# Patient Record
Sex: FEMALE | Race: WHITE | NOT HISPANIC OR LATINO | Employment: OTHER | URBAN - METROPOLITAN AREA
[De-identification: names, ages, dates, MRNs, and addresses within clinical notes are randomized per-mention and may not be internally consistent; named-entity substitution may affect disease eponyms.]

---

## 2017-01-04 ENCOUNTER — APPOINTMENT (OUTPATIENT)
Dept: RADIOLOGY | Facility: MEDICAL CENTER | Age: 37
DRG: 640 | End: 2017-01-04
Payer: MEDICARE

## 2017-01-04 ENCOUNTER — RESOLUTE PROFESSIONAL BILLING HOSPITAL PROF FEE (OUTPATIENT)
Dept: HOSPITALIST | Facility: MEDICAL CENTER | Age: 37
End: 2017-01-04
Payer: MEDICARE

## 2017-01-04 ENCOUNTER — HOSPITAL ENCOUNTER (INPATIENT)
Facility: MEDICAL CENTER | Age: 37
LOS: 1 days | DRG: 640 | End: 2017-01-04
Attending: EMERGENCY MEDICINE | Admitting: INTERNAL MEDICINE
Payer: MEDICARE

## 2017-01-04 VITALS
DIASTOLIC BLOOD PRESSURE: 85 MMHG | OXYGEN SATURATION: 97 % | HEART RATE: 84 BPM | TEMPERATURE: 98 F | WEIGHT: 175.04 LBS | RESPIRATION RATE: 16 BRPM | HEIGHT: 65 IN | SYSTOLIC BLOOD PRESSURE: 153 MMHG | BODY MASS INDEX: 29.16 KG/M2

## 2017-01-04 DIAGNOSIS — Z91.158 NONCOMPLIANCE WITH RENAL DIALYSIS: ICD-10-CM

## 2017-01-04 DIAGNOSIS — N30.00 ACUTE CYSTITIS WITHOUT HEMATURIA: ICD-10-CM

## 2017-01-04 DIAGNOSIS — Z99.2 ESRD NEEDING DIALYSIS (HCC): ICD-10-CM

## 2017-01-04 DIAGNOSIS — E87.79 OTHER HYPERVOLEMIA: ICD-10-CM

## 2017-01-04 DIAGNOSIS — N18.6 ESRD NEEDING DIALYSIS (HCC): ICD-10-CM

## 2017-01-04 PROBLEM — Z91.199 MEDICAL NON-COMPLIANCE: Status: ACTIVE | Noted: 2017-01-04

## 2017-01-04 LAB
ALBUMIN SERPL BCP-MCNC: 3.6 G/DL (ref 3.2–4.9)
ALBUMIN/GLOB SERPL: 1.5 G/DL
ALP SERPL-CCNC: 54 U/L (ref 30–99)
ALT SERPL-CCNC: 10 U/L (ref 2–50)
ANION GAP SERPL CALC-SCNC: 10 MMOL/L (ref 0–11.9)
APPEARANCE UR: ABNORMAL
APPEARANCE UR: CLEAR
APTT PPP: 29.3 SEC (ref 24.7–36)
AST SERPL-CCNC: 11 U/L (ref 12–45)
BACTERIA #/AREA URNS HPF: ABNORMAL /HPF
BASOPHILS # BLD AUTO: 0.8 % (ref 0–1.8)
BASOPHILS # BLD: 0.06 K/UL (ref 0–0.12)
BILIRUB SERPL-MCNC: 1.1 MG/DL (ref 0.1–1.5)
BILIRUB UR QL STRIP.AUTO: NEGATIVE
BNP SERPL-MCNC: 142 PG/ML (ref 0–100)
BUN SERPL-MCNC: 69 MG/DL (ref 8–22)
CALCIUM SERPL-MCNC: 7.6 MG/DL (ref 8.4–10.2)
CHLORIDE SERPL-SCNC: 110 MMOL/L (ref 96–112)
CO2 SERPL-SCNC: 18 MMOL/L (ref 20–33)
COLOR UR: YELLOW
COLOR UR: YELLOW
CREAT SERPL-MCNC: 6.39 MG/DL (ref 0.5–1.4)
CULTURE IF INDICATED INDCX: YES UA CULTURE
EKG IMPRESSION: NORMAL
EOSINOPHIL # BLD AUTO: 0.12 K/UL (ref 0–0.51)
EOSINOPHIL NFR BLD: 1.5 % (ref 0–6.9)
EPI CELLS #/AREA URNS HPF: ABNORMAL /HPF
ERYTHROCYTE [DISTWIDTH] IN BLOOD BY AUTOMATED COUNT: 42.8 FL (ref 35.9–50)
GFR SERPL CREATININE-BSD FRML MDRD: 7 ML/MIN/1.73 M 2
GLOBULIN SER CALC-MCNC: 2.4 G/DL (ref 1.9–3.5)
GLUCOSE SERPL-MCNC: 107 MG/DL (ref 65–99)
GLUCOSE UR STRIP.AUTO-MCNC: NEGATIVE MG/DL
GLUCOSE UR STRIP.AUTO-MCNC: NEGATIVE MG/DL
HCG UR QL: NEGATIVE
HCT VFR BLD AUTO: 32.1 % (ref 37–47)
HGB BLD-MCNC: 10.7 G/DL (ref 12–16)
IMM GRANULOCYTES # BLD AUTO: 0.07 K/UL (ref 0–0.11)
IMM GRANULOCYTES NFR BLD AUTO: 0.9 % (ref 0–0.9)
INR PPP: 0.97 (ref 0.87–1.13)
KETONES UR STRIP.AUTO-MCNC: NEGATIVE MG/DL
KETONES UR STRIP.AUTO-MCNC: NEGATIVE MG/DL
LACTATE BLD-SCNC: 1.15 MMOL/L (ref 0.5–2)
LEUKOCYTE ESTERASE UR QL STRIP.AUTO: ABNORMAL
LEUKOCYTE ESTERASE UR QL STRIP.AUTO: ABNORMAL
LYMPHOCYTES # BLD AUTO: 2.1 K/UL (ref 1–4.8)
LYMPHOCYTES NFR BLD: 26.3 % (ref 22–41)
MCH RBC QN AUTO: 32.1 PG (ref 27–33)
MCHC RBC AUTO-ENTMCNC: 33.3 G/DL (ref 33.6–35)
MCV RBC AUTO: 96.4 FL (ref 81.4–97.8)
MICRO URNS: ABNORMAL
MONOCYTES # BLD AUTO: 0.65 K/UL (ref 0–0.85)
MONOCYTES NFR BLD AUTO: 8.1 % (ref 0–13.4)
MUCOUS THREADS #/AREA URNS HPF: ABNORMAL /HPF
NEUTROPHILS # BLD AUTO: 5 K/UL (ref 2–7.15)
NEUTROPHILS NFR BLD: 62.4 % (ref 44–72)
NITRITE UR QL STRIP.AUTO: POSITIVE
NITRITE UR QL STRIP.AUTO: POSITIVE
NRBC # BLD AUTO: 0 K/UL
NRBC BLD AUTO-RTO: 0 /100 WBC
PH UR STRIP.AUTO: 6 [PH]
PH UR STRIP.AUTO: 6 [PH]
PLATELET # BLD AUTO: 140 K/UL (ref 164–446)
PMV BLD AUTO: 10.1 FL (ref 9–12.9)
POTASSIUM SERPL-SCNC: 4 MMOL/L (ref 3.6–5.5)
PROT SERPL-MCNC: 6 G/DL (ref 6–8.2)
PROT UR QL STRIP: 100 MG/DL
PROT UR QL STRIP: 100 MG/DL
PROTHROMBIN TIME: 12.7 SEC (ref 12–14.6)
RBC # BLD AUTO: 3.33 M/UL (ref 4.2–5.4)
RBC # URNS HPF: ABNORMAL /HPF
RBC UR QL AUTO: ABNORMAL
RBC UR QL AUTO: ABNORMAL
SODIUM SERPL-SCNC: 138 MMOL/L (ref 135–145)
SP GR UR STRIP.AUTO: 1.02
SP GR UR STRIP.AUTO: 1.02
SPECIMEN DRAWN FROM PATIENT: NORMAL
TROPONIN I SERPL-MCNC: <0.02 NG/ML (ref 0–0.04)
WBC # BLD AUTO: 8 K/UL (ref 4.8–10.8)
WBC #/AREA URNS HPF: ABNORMAL /HPF

## 2017-01-04 PROCEDURE — 81001 URINALYSIS AUTO W/SCOPE: CPT

## 2017-01-04 PROCEDURE — 83880 ASSAY OF NATRIURETIC PEPTIDE: CPT

## 2017-01-04 PROCEDURE — 36415 COLL VENOUS BLD VENIPUNCTURE: CPT

## 2017-01-04 PROCEDURE — 84484 ASSAY OF TROPONIN QUANT: CPT

## 2017-01-04 PROCEDURE — 71010 DX-CHEST-PORTABLE (1 VIEW): CPT

## 2017-01-04 PROCEDURE — 87077 CULTURE AEROBIC IDENTIFY: CPT

## 2017-01-04 PROCEDURE — 83605 ASSAY OF LACTIC ACID: CPT

## 2017-01-04 PROCEDURE — A9270 NON-COVERED ITEM OR SERVICE: HCPCS | Performed by: INTERNAL MEDICINE

## 2017-01-04 PROCEDURE — 700101 HCHG RX REV CODE 250: Performed by: INTERNAL MEDICINE

## 2017-01-04 PROCEDURE — 87086 URINE CULTURE/COLONY COUNT: CPT

## 2017-01-04 PROCEDURE — 87040 BLOOD CULTURE FOR BACTERIA: CPT | Mod: 91

## 2017-01-04 PROCEDURE — 700111 HCHG RX REV CODE 636 W/ 250 OVERRIDE (IP): Performed by: EMERGENCY MEDICINE

## 2017-01-04 PROCEDURE — 80053 COMPREHEN METABOLIC PANEL: CPT

## 2017-01-04 PROCEDURE — 85025 COMPLETE CBC W/AUTO DIFF WBC: CPT

## 2017-01-04 PROCEDURE — 700102 HCHG RX REV CODE 250 W/ 637 OVERRIDE(OP): Performed by: INTERNAL MEDICINE

## 2017-01-04 PROCEDURE — 770006 HCHG ROOM/CARE - MED/SURG/GYN SEMI*

## 2017-01-04 PROCEDURE — 81002 URINALYSIS NONAUTO W/O SCOPE: CPT

## 2017-01-04 PROCEDURE — 81025 URINE PREGNANCY TEST: CPT

## 2017-01-04 PROCEDURE — 700111 HCHG RX REV CODE 636 W/ 250 OVERRIDE (IP): Performed by: INTERNAL MEDICINE

## 2017-01-04 PROCEDURE — 700102 HCHG RX REV CODE 250 W/ 637 OVERRIDE(OP): Performed by: GENERAL ACUTE CARE HOSPITAL

## 2017-01-04 PROCEDURE — 96365 THER/PROPH/DIAG IV INF INIT: CPT

## 2017-01-04 PROCEDURE — 85730 THROMBOPLASTIN TIME PARTIAL: CPT

## 2017-01-04 PROCEDURE — 700112 HCHG RX REV CODE 229: Performed by: INTERNAL MEDICINE

## 2017-01-04 PROCEDURE — 99285 EMERGENCY DEPT VISIT HI MDM: CPT

## 2017-01-04 PROCEDURE — 93005 ELECTROCARDIOGRAM TRACING: CPT | Performed by: GENERAL ACUTE CARE HOSPITAL

## 2017-01-04 PROCEDURE — 99223 1ST HOSP IP/OBS HIGH 75: CPT | Performed by: INTERNAL MEDICINE

## 2017-01-04 PROCEDURE — 85610 PROTHROMBIN TIME: CPT

## 2017-01-04 PROCEDURE — A9270 NON-COVERED ITEM OR SERVICE: HCPCS | Performed by: GENERAL ACUTE CARE HOSPITAL

## 2017-01-04 PROCEDURE — 96375 TX/PRO/DX INJ NEW DRUG ADDON: CPT

## 2017-01-04 RX ORDER — HEPARIN SODIUM 1000 [USP'U]/ML
2600 INJECTION, SOLUTION INTRAVENOUS; SUBCUTANEOUS PRN
Status: DISCONTINUED | OUTPATIENT
Start: 2017-01-04 | End: 2017-01-04 | Stop reason: HOSPADM

## 2017-01-04 RX ORDER — PROMETHAZINE HYDROCHLORIDE 25 MG/1
12.5-25 TABLET ORAL EVERY 4 HOURS PRN
Status: DISCONTINUED | OUTPATIENT
Start: 2017-01-04 | End: 2017-01-04 | Stop reason: HOSPADM

## 2017-01-04 RX ORDER — BUPROPION HYDROCHLORIDE 150 MG/1
150 TABLET, EXTENDED RELEASE ORAL DAILY
Status: DISCONTINUED | OUTPATIENT
Start: 2017-01-04 | End: 2017-01-04 | Stop reason: HOSPADM

## 2017-01-04 RX ORDER — ONDANSETRON 2 MG/ML
4 INJECTION INTRAMUSCULAR; INTRAVENOUS EVERY 4 HOURS PRN
Status: DISCONTINUED | OUTPATIENT
Start: 2017-01-04 | End: 2017-01-04 | Stop reason: HOSPADM

## 2017-01-04 RX ORDER — TRAZODONE HYDROCHLORIDE 50 MG/1
50 TABLET ORAL
Status: DISCONTINUED | OUTPATIENT
Start: 2017-01-04 | End: 2017-01-04 | Stop reason: HOSPADM

## 2017-01-04 RX ORDER — LABETALOL HYDROCHLORIDE 5 MG/ML
10 INJECTION, SOLUTION INTRAVENOUS EVERY 4 HOURS PRN
Status: DISCONTINUED | OUTPATIENT
Start: 2017-01-04 | End: 2017-01-04 | Stop reason: HOSPADM

## 2017-01-04 RX ORDER — TIZANIDINE 4 MG/1
8 TABLET ORAL
Status: DISCONTINUED | OUTPATIENT
Start: 2017-01-04 | End: 2017-01-04 | Stop reason: HOSPADM

## 2017-01-04 RX ORDER — ACETAMINOPHEN 325 MG/1
650 TABLET ORAL EVERY 6 HOURS PRN
Status: DISCONTINUED | OUTPATIENT
Start: 2017-01-04 | End: 2017-01-04 | Stop reason: HOSPADM

## 2017-01-04 RX ORDER — CEFTRIAXONE 2 G/1
2 INJECTION, POWDER, FOR SOLUTION INTRAMUSCULAR; INTRAVENOUS ONCE
Status: COMPLETED | OUTPATIENT
Start: 2017-01-04 | End: 2017-01-04

## 2017-01-04 RX ORDER — SODIUM CHLORIDE 9 MG/ML
1000 INJECTION, SOLUTION INTRAVENOUS ONCE
Status: DISCONTINUED | OUTPATIENT
Start: 2017-01-04 | End: 2017-01-04

## 2017-01-04 RX ORDER — PREGABALIN 25 MG/1
150 CAPSULE ORAL 3 TIMES DAILY
Status: DISCONTINUED | OUTPATIENT
Start: 2017-01-04 | End: 2017-01-04 | Stop reason: HOSPADM

## 2017-01-04 RX ORDER — LIDOCAINE 50 MG/G
1 PATCH TOPICAL EVERY 24 HOURS
Status: DISCONTINUED | OUTPATIENT
Start: 2017-01-04 | End: 2017-01-04 | Stop reason: HOSPADM

## 2017-01-04 RX ORDER — HYDROCODONE BITARTRATE AND ACETAMINOPHEN 5; 325 MG/1; MG/1
1 TABLET ORAL ONCE
Status: COMPLETED | OUTPATIENT
Start: 2017-01-04 | End: 2017-01-04

## 2017-01-04 RX ORDER — AMOXICILLIN 250 MG
1 CAPSULE ORAL
Status: DISCONTINUED | OUTPATIENT
Start: 2017-01-04 | End: 2017-01-04 | Stop reason: HOSPADM

## 2017-01-04 RX ORDER — OXYCODONE HYDROCHLORIDE 20 MG/1
10-20 TABLET ORAL EVERY 4 HOURS PRN
Status: DISCONTINUED | OUTPATIENT
Start: 2017-01-04 | End: 2017-01-04

## 2017-01-04 RX ORDER — ONDANSETRON 2 MG/ML
4 INJECTION INTRAMUSCULAR; INTRAVENOUS ONCE
Status: COMPLETED | OUTPATIENT
Start: 2017-01-04 | End: 2017-01-04

## 2017-01-04 RX ORDER — PROMETHAZINE HYDROCHLORIDE 25 MG/1
25 TABLET ORAL EVERY 6 HOURS PRN
Status: DISCONTINUED | OUTPATIENT
Start: 2017-01-04 | End: 2017-01-04 | Stop reason: HOSPADM

## 2017-01-04 RX ORDER — BUPROPION HYDROCHLORIDE 150 MG/1
150 TABLET ORAL EVERY MORNING
Status: ON HOLD | COMMUNITY
End: 2018-12-17

## 2017-01-04 RX ORDER — AMOXICILLIN 250 MG
1 CAPSULE ORAL NIGHTLY
Status: DISCONTINUED | OUTPATIENT
Start: 2017-01-04 | End: 2017-01-04 | Stop reason: HOSPADM

## 2017-01-04 RX ORDER — LACTULOSE 20 G/30ML
30 SOLUTION ORAL
Status: DISCONTINUED | OUTPATIENT
Start: 2017-01-04 | End: 2017-01-04 | Stop reason: HOSPADM

## 2017-01-04 RX ORDER — HEPARIN SODIUM 1000 [USP'U]/ML
5000 INJECTION, SOLUTION INTRAVENOUS; SUBCUTANEOUS ONCE
Status: DISCONTINUED | OUTPATIENT
Start: 2017-01-04 | End: 2017-01-04 | Stop reason: HOSPADM

## 2017-01-04 RX ORDER — PROMETHAZINE HYDROCHLORIDE 25 MG/1
12.5-25 SUPPOSITORY RECTAL EVERY 4 HOURS PRN
Status: DISCONTINUED | OUTPATIENT
Start: 2017-01-04 | End: 2017-01-04 | Stop reason: HOSPADM

## 2017-01-04 RX ORDER — DOCUSATE SODIUM 100 MG/1
100 CAPSULE, LIQUID FILLED ORAL EVERY MORNING
Status: DISCONTINUED | OUTPATIENT
Start: 2017-01-04 | End: 2017-01-04 | Stop reason: HOSPADM

## 2017-01-04 RX ORDER — ONDANSETRON 4 MG/1
4 TABLET, ORALLY DISINTEGRATING ORAL EVERY 4 HOURS PRN
Status: DISCONTINUED | OUTPATIENT
Start: 2017-01-04 | End: 2017-01-04 | Stop reason: HOSPADM

## 2017-01-04 RX ORDER — M-VIT,TX,IRON,MINS/CALC/FOLIC 27MG-0.4MG
1 TABLET ORAL DAILY
Status: DISCONTINUED | OUTPATIENT
Start: 2017-01-04 | End: 2017-01-04 | Stop reason: HOSPADM

## 2017-01-04 RX ORDER — BISACODYL 10 MG
10 SUPPOSITORY, RECTAL RECTAL
Status: DISCONTINUED | OUTPATIENT
Start: 2017-01-04 | End: 2017-01-04 | Stop reason: HOSPADM

## 2017-01-04 RX ORDER — HYDROXYCHLOROQUINE SULFATE 200 MG/1
200 TABLET, FILM COATED ORAL
Status: DISCONTINUED | OUTPATIENT
Start: 2017-01-04 | End: 2017-01-04 | Stop reason: HOSPADM

## 2017-01-04 RX ORDER — ASCORBIC ACID 500 MG
500 TABLET ORAL DAILY
Status: DISCONTINUED | OUTPATIENT
Start: 2017-01-04 | End: 2017-01-04 | Stop reason: HOSPADM

## 2017-01-04 RX ORDER — ENEMA 19; 7 G/133ML; G/133ML
1 ENEMA RECTAL
Status: DISCONTINUED | OUTPATIENT
Start: 2017-01-04 | End: 2017-01-04 | Stop reason: HOSPADM

## 2017-01-04 RX ORDER — OXYCODONE HYDROCHLORIDE 10 MG/1
10 TABLET ORAL EVERY 4 HOURS PRN
Status: DISCONTINUED | OUTPATIENT
Start: 2017-01-04 | End: 2017-01-04 | Stop reason: HOSPADM

## 2017-01-04 RX ORDER — OXYCODONE HYDROCHLORIDE 10 MG/1
20 TABLET ORAL EVERY 4 HOURS PRN
Status: DISCONTINUED | OUTPATIENT
Start: 2017-01-04 | End: 2017-01-04 | Stop reason: HOSPADM

## 2017-01-04 RX ORDER — WARFARIN SODIUM 5 MG/1
5-75 TABLET ORAL EVERY EVENING
Status: DISCONTINUED | OUTPATIENT
Start: 2017-01-04 | End: 2017-01-04

## 2017-01-04 RX ORDER — WARFARIN SODIUM 7.5 MG/1
7.5 TABLET ORAL
Status: DISCONTINUED | OUTPATIENT
Start: 2017-01-04 | End: 2017-01-04 | Stop reason: HOSPADM

## 2017-01-04 RX ORDER — HYDRALAZINE HYDROCHLORIDE 20 MG/ML
20 INJECTION INTRAMUSCULAR; INTRAVENOUS EVERY 6 HOURS PRN
Status: DISCONTINUED | OUTPATIENT
Start: 2017-01-04 | End: 2017-01-04 | Stop reason: HOSPADM

## 2017-01-04 RX ADMIN — BUPROPION HYDROCHLORIDE 150 MG: 150 TABLET, FILM COATED, EXTENDED RELEASE ORAL at 09:41

## 2017-01-04 RX ADMIN — DOCUSATE SODIUM 100 MG: 100 CAPSULE, LIQUID FILLED ORAL at 09:41

## 2017-01-04 RX ADMIN — CALCIUM ACETATE 667 MG: 667 CAPSULE ORAL at 11:36

## 2017-01-04 RX ADMIN — HEPARIN 500 UNITS: 100 SYRINGE at 14:06

## 2017-01-04 RX ADMIN — CALCIUM GLUCONATE 1 G: 94 INJECTION, SOLUTION INTRAVENOUS at 11:32

## 2017-01-04 RX ADMIN — OXYCODONE HYDROCHLORIDE AND ACETAMINOPHEN 500 MG: 500 TABLET ORAL at 09:41

## 2017-01-04 RX ADMIN — MULTIPLE VITAMINS W/ MINERALS TAB 1 TABLET: TAB at 09:42

## 2017-01-04 RX ADMIN — PREGABALIN 150 MG: 25 CAPSULE ORAL at 09:39

## 2017-01-04 RX ADMIN — HYDROCODONE BITARTRATE AND ACETAMINOPHEN 1 TABLET: 5; 325 TABLET ORAL at 05:52

## 2017-01-04 RX ADMIN — LIDOCAINE 1 PATCH: 50 PATCH CUTANEOUS at 09:36

## 2017-01-04 RX ADMIN — CEFTRIAXONE 2 G: 2 INJECTION, POWDER, FOR SOLUTION INTRAMUSCULAR; INTRAVENOUS at 06:47

## 2017-01-04 RX ADMIN — OXYCODONE HYDROCHLORIDE 20 MG: 10 TABLET ORAL at 09:42

## 2017-01-04 RX ADMIN — HYDROMORPHONE HYDROCHLORIDE 1 MG: 1 INJECTION, SOLUTION INTRAMUSCULAR; INTRAVENOUS; SUBCUTANEOUS at 06:48

## 2017-01-04 RX ADMIN — ONDANSETRON HYDROCHLORIDE 4 MG: 2 INJECTION, SOLUTION INTRAMUSCULAR; INTRAVENOUS at 06:48

## 2017-01-04 ASSESSMENT — LIFESTYLE VARIABLES
ALCOHOL_USE: NO
DO YOU DRINK ALCOHOL: NO
EVER_SMOKED: YES

## 2017-01-04 ASSESSMENT — PAIN SCALES - GENERAL
PAINLEVEL_OUTOF10: 9
PAINLEVEL_OUTOF10: 9
PAINLEVEL_OUTOF10: 8

## 2017-01-04 NOTE — PROGRESS NOTES
Inpatient Anticoagulation Service Note    Date: 1/4/2017  Reason for Anticoagulation: Deep Vein Thrombosis  Hemoglobin Value: 10.7  Hematocrit Value: 32.1  Lab Platelet Value: 140  Target INR: 2.0 to 3.0  INR from last 7 days     Date/Time INR Value    01/04/17 0535 0.97        Dose from last 7 days     Date/Time Dose (mg)    01/04/17 1120 7.5        Average Dose (mg):  (Non-compliant; Home dosing 7.5 mg Sat, Tues; 5 mg all other )  Significant Interactions: Antibiotics  Bridge Therapy: Yes (Heparin Weight Based Protocol)  Bridge Therapy Start Date: 01/04/17  Days of Overlap Therapy: 0         Plan:  Coumadin 7.5 mg tonight  Education Material Provided?: No (Previously on)  Pharmacist suggested discharge dosing: Resume home dose as listed above       Sherwin Elaine PharmD BCPS

## 2017-01-04 NOTE — PROGRESS NOTES
0920: Assumed care for the shift.  Patient arrived from ER via gurney.  Safety precautions in place, will continue to monitor.     0945: Administered patient meds per MAR, patient is resting and watching TV, safety precautions are in place.  Will continue to monitor.     1100: Patient report given to JOSETTE Guardado. Safety precautions in place.  Patient has no complaints or concerns at this time.

## 2017-01-04 NOTE — PROGRESS NOTES
1250 NOD was about to start the Heparin infusion when patient notified NOD that she wants to leave AMA.  NOD notified Dr. Tariq of patient's wanting to leave AMA.    1304 Patient refused to sign AMA. JOSETTE Silverman as another witness.    1406 Left chest Port de-accessed as per protocol. No bleediing noted to the site, band-aid applied.    1415 Patient left with patient's belongings, left AMA.

## 2017-01-04 NOTE — ED NOTES
"Chief Complaint   Patient presents with   • Chronic Renal Failure     for the past 3 weeks flank pain bilaterally.   • Generalized Body Aches   • Painful Urination     for the past 3 weeks   • Extremity Weakness     pt states feeling dizzy before falling, for the past 3 weeks     /123 mmHg  Pulse 85  Temp(Src) 37.1 °C (98.8 °F)  Resp 18  Ht 1.651 m (5' 5\")  Wt 80 kg (176 lb 5.9 oz)  BMI 29.35 kg/m2  SpO2 99%    "

## 2017-01-04 NOTE — ED PROVIDER NOTES
ED Provider Note    CHIEF COMPLAINT  Chief Complaint   Patient presents with   • Chronic Renal Failure     for the past 3 weeks flank pain bilaterally.   • Generalized Body Aches   • Painful Urination     for the past 3 weeks   • Extremity Weakness     pt states feeling dizzy before falling, for the past 3 weeks       HPI  Debby Carrizales is a 36 y.o. female who presents to the emergency department with a chief complaint of generally feeling poorly. The patient has end-stage renal disease. She is supposed to be on dialysis. However the patient says that she has chronic pain and that dialysis treatments are uncomfortable for her. She says that the chairs are uncomfortable. She says she has fibromyalgia and diffuse body pain which makes it unpleasant to undergo dialysis. Because of this the patient has not gone to her dialysis for approximately 3 weeks. She reports some burning with urination. She complains of full body pain.    REVIEW OF SYSTEMS  See HPI for further details. All other systems are negative.     PAST MEDICAL HISTORY  Past Medical History   Diagnosis Date   • Lupus (HCC)    • Fibromyalgia    • Hypertension    • Backpain    • Avascular necrosis    • Renal failure      stage 4 per pt   • Heart burn    • Arthritis      all joints,r/t lupus   • Psychiatric disorder      anxiety, depression   • UTI (urinary tract infection) 4/4/2013   • Clostridium difficile colitis 5/3/2011   • Pneumonia    • Dialysis patient        FAMILY HISTORY  History reviewed. No pertinent family history.    SOCIAL HISTORY  Social History     Social History   • Marital Status: Single     Spouse Name: N/A   • Number of Children: N/A   • Years of Education: N/A     Social History Main Topics   • Smoking status: Current Every Day Smoker -- 0.50 packs/day for 18 years     Types: Cigarettes     Last Attempt to Quit: 06/13/2011   • Smokeless tobacco: Never Used      Comment: 1/2 ppd   • Alcohol Use: No      Comment: occ   •  Drug Use: No   • Sexual Activity: Not Asked     Other Topics Concern   • None     Social History Narrative       SURGICAL HISTORY  Past Surgical History   Procedure Laterality Date   • Gastroscopy-endo  4/10/2011     Performed by SYED JURADO at ENDOSCOPY [facility]   • Sclerotheraphy  4/10/2011     Performed by SYED JURADO at [facility]   • Gastroscopy-endo  4/18/2011     Performed by ALCIRA CRUZ at [facility]   • Colonoscopy with biopsy  4/20/2011     Performed by ALCIRA CRUZ at [facility]   • Gastroscopy-endo  4/27/2011     Performed by PALMIRA DOAN at [facility]   • Other  5/2011     tracheostomy   • Other abdominal surgery       kidney biopsy   • Av fistula creation  9/9/2014     Performed by Shabbir Ardon M.D. at [facility]   • Cath placement  9/9/2014     Performed by Shabbir Ardon M.D. at SURGERY [facility]   • Av fistula creation  11/14/2014     Performed by Shabbir Ardon M.D. at SURGERY [facility]   • Av fistula creation  2/3/2015     Performed by Shabbir Ardon M.D. at SURGERY [facility]   • Hip arthroplasty total Right 1/18/2016     Procedure: HIP ARTHROPLASTY TOTAL;  Surgeon: Michael Holman M.D.;  Location: [facility];  Service:    • Closed reduction Right 7/5/2016     Procedure: CLOSED REDUCTION- Hip ;  Surgeon: Michael Holman M.D.;  Location: SURGERY [facility];  Service:    • Av fistula creation Right 7/12/2016     Procedure: AV FISTULA CREATION WITH GRAFT BRACHIAL AXILLARY;  Surgeon: Shabbir Ardon M.D.;  Location: SURGERY [facility];  Service:    • Thrombectomy Right 8/20/2016     Procedure: THROMBECTOMY AV GRAFT;  Surgeon: Shabbir Ardon M.D.;  Location: [facility];  Service:    • Thrombectomy Right 8/21/2016     Procedure: THROMBECTOMY - right AV fistula graft with grams;  Surgeon: Shabbir Ardon M.D.;   "Location: SURGERY [facility];  Service:    • Angioplasty balloon  8/21/2016     Procedure: ANGIOPLASTY BALLOON;  Surgeon: Shabbir Ardon M.D.;  Location: SURGERY [facility];  Service:    • Tracheostomy         CURRENT MEDICATIONS  Home Medications     Reviewed by Jory Santoro R.N. (Registered Nurse) on 01/04/17 at 0514  Med List Status: Complete    Medication Last Dose Status    Ascorbic Acid (VITAMIN C PO) 1/3/2017 Active    buPROPion SR (WELLBUTRIN-SR) 150 MG TABLET SR 12 HR sustained-release tablet 1/3/2017 Active    calcium acetate (PHOSLO) 667 MG Cap 1/3/2017 Active    cholecalciferol (VITAMIN D3) 5000 UNIT Cap 1/3/2017 Active    hydroxychloroquine (PLAQUENIL) 200 MG Tab 1/3/2017 Active    lidocaine (LIDODERM) 5 % Patch 1/3/2017 Active    Oxycodone HCl 20 MG Tab 1/3/2017 Active    pregabalin (LYRICA) 150 MG Cap 1/3/2017 Active    promethazine (PHENERGAN) 25 MG Tab 1/3/2017 Active    therapeutic multivitamin-minerals (THERAGRAN-M) Tab 1/3/2017 Active    tizanidine (ZANAFLEX) 4 MG Tab 1/3/2017 Active    trazodone (DESYREL) 50 MG Tab 1/3/2017 Active    warfarin (COUMADIN) 5 MG Tab 1/3/2017 Active                ALLERGIES  Allergies   Allergen Reactions   • Morphine Itching     Tolerates Dilaudid   • Seasonal Runny Nose and Itching     Hay fever, sabiha brush       PHYSICAL EXAM  VITAL SIGNS: /123 mmHg  Pulse 85  Temp(Src) 37.1 °C (98.8 °F)  Resp 18  Ht 1.651 m (5' 5\")  Wt 80 kg (176 lb 5.9 oz)  BMI 29.35 kg/m2  SpO2 99%  LMP 12/18/2016 (Approximate)  Constitutional: Well developed, Well nourished, mild distress, Non-toxic appearance.   HENT: Normocephalic, Atraumatic, Bilateral external ears normal, Oropharynx moist, No oral exudates, Nose normal.   Eyes: PERRL, EOMI, Conjunctiva normal, No discharge.   Neck: Normal range of motion, No tenderness, Supple, No stridor.   Lymphatic: No lymphadenopathy noted.   Cardiovascular: RV heave noted, Normal heart rate, Normal rhythm, No murmurs, No " rubs, No gallops.   Thorax & Lungs: Normal breath sounds, No respiratory distress, No wheezing, No chest tenderness. Port site in the left upper chest wall noted.  Abdomen: Soft, nontender, nondistended, no organomegaly.  Skin: Warm, Dry, No erythema, No rash.   Back: No tenderness, No CVA tenderness.   Extremities: Intact distal pulses, No edema, No tenderness, No cyanosis, No clubbing. Right AV fistula with palpable thrill.  Neurologic: Alert & oriented x 3, Normal motor function, Normal sensory function, No focal deficits noted.       RADIOLOGY/PROCEDURES  Results for orders placed or performed during the hospital encounter of 01/04/17   CBC WITH DIFFERENTIAL   Result Value Ref Range    WBC 8.0 4.8 - 10.8 K/uL    RBC 3.33 (L) 4.20 - 5.40 M/uL    Hemoglobin 10.7 (L) 12.0 - 16.0 g/dL    Hematocrit 32.1 (L) 37.0 - 47.0 %    MCV 96.4 81.4 - 97.8 fL    MCH 32.1 27.0 - 33.0 pg    MCHC 33.3 (L) 33.6 - 35.0 g/dL    RDW 42.8 35.9 - 50.0 fL    Platelet Count 140 (L) 164 - 446 K/uL    MPV 10.1 9.0 - 12.9 fL    Neutrophils-Polys 62.40 44.00 - 72.00 %    Lymphocytes 26.30 22.00 - 41.00 %    Monocytes 8.10 0.00 - 13.40 %    Eosinophils 1.50 0.00 - 6.90 %    Basophils 0.80 0.00 - 1.80 %    Immature Granulocytes 0.90 0.00 - 0.90 %    Nucleated RBC 0.00 /100 WBC    Neutrophils (Absolute) 5.00 2.00 - 7.15 K/uL    Lymphs (Absolute) 2.10 1.00 - 4.80 K/uL    Monos (Absolute) 0.65 0.00 - 0.85 K/uL    Eos (Absolute) 0.12 0.00 - 0.51 K/uL    Baso (Absolute) 0.06 0.00 - 0.12 K/uL    Immature Granulocytes (abs) 0.07 0.00 - 0.11 K/uL    NRBC (Absolute) 0.00 K/uL   COMP METABOLIC PANEL   Result Value Ref Range    Sodium 138 135 - 145 mmol/L    Potassium 4.0 3.6 - 5.5 mmol/L    Chloride 110 96 - 112 mmol/L    Co2 18 (L) 20 - 33 mmol/L    Anion Gap 10.0 0.0 - 11.9    Glucose 107 (H) 65 - 99 mg/dL    Calcium 7.6 (L) 8.4 - 10.2 mg/dL    AST(SGOT) 11 (L) 12 - 45 U/L    ALT(SGPT) 10 2 - 50 U/L    Alkaline Phosphatase 54 30 - 99 U/L    Total  Bilirubin 1.1 0.1 - 1.5 mg/dL    Albumin 3.6 3.2 - 4.9 g/dL    Total Protein 6.0 6.0 - 8.2 g/dL    Globulin 2.4 1.9 - 3.5 g/dL    A-G Ratio 1.5 g/dL    Bun 69 (H) 8 - 22 mg/dL    Creatinine 6.39 (HH) 0.50 - 1.40 mg/dL   TROPONIN   Result Value Ref Range    Troponin I <0.02 0.00 - 0.04 ng/mL   BTYPE NATRIURETIC PEPTIDE   Result Value Ref Range    B Natriuretic Peptide 142 (H) 0 - 100 pg/mL   LACTIC ACID   Result Value Ref Range    Lactic Acid 1.15 0.50 - 2.00 mmol/L    Specimen Venous    URINALYSIS CULTURE, IF INDICATED   Result Value Ref Range    Micro Urine Req Microscopic     Color Yellow     Character Clear     Specific Gravity 1.020 <1.035    Ph 6.0 5.0-8.0    Glucose Negative Negative mg/dL    Ketones Negative Negative mg/dL    Protein 100 (A) Negative mg/dL    Bilirubin Negative Negative    Nitrite Positive (A) Negative    Leukocyte Esterase Small (A) Negative    Occult Blood Trace (A) Negative    Culture Indicated Yes UA Culture   URINE MICROSCOPIC (W/UA)   Result Value Ref Range    WBC 20-50 (A) /hpf    RBC 2-5 (A) /hpf    Bacteria Few (A) None /hpf    Epithelial Cells Moderate (A) Few /hpf    Mucous Threads Few /hpf   ESTIMATED GFR   Result Value Ref Range    GFR If  9 (A) >60 mL/min/1.73 m 2    GFR If Non African American 7 (A) >60 mL/min/1.73 m 2   POC UA   Result Value Ref Range    POC Color Yellow     POC Appearance Slightly Cloudy (A)     POC Glucose Negative Negative mg/dL    POC Ketones Negative Negative mg/dL    POC Specific Gravity 1.020 1.005-1.030    POC Blood Trace-intact (A) Negative    POC Urine PH 6.0 5.0-8.0    POC Protein 100 (A) Negative mg/dL    POC Nitrites Positive (A) Negative    POC Leukocyte Esterase Small (A) Negative   POC URINE PREGNANCY   Result Value Ref Range    POC Urine Pregnancy Test Negative    EKG (NOW)   Result Value Ref Range    Report       [facility]    Test Date:  2017-01-04  Pt Name:    TAYLOR WARD                Department: [facility]  MRN:        [id number]                      [facility]  Gender:     F                            Technician: KORY  :        1980                   Requested By:ZUHAIR ESCUDERO  Order #:    [id number]                    Reading MD:    Measurements  Intervals                                Axis  Rate:       78                           P:          26  CT:         144                          QRS:        28  QRSD:       82                           T:          40  QT:         404  QTc:        461    Interpretive Statements  SINUS RHYTHM  Compared to ECG 10/10/2016 08:29:41  No significant changes       DX-CHEST-PORTABLE (1 VIEW)   Final Result         1. No acute cardiopulmonary abnormalities are identified.            COURSE & MEDICAL DECISION MAKING  Pertinent Labs & Imaging studies reviewed. (See chart for details)    The patient presents today with end-stage renal disease and noncompliance with dialysis. She's not been to dialysis for 3 weeks. The patient is hypertensive. She has hypertensive urgency. Urinalysis is obtained which is consistent with urinary tract infection. The patient does make considerable amounts of urine and has been having dysuria.    Laboratory studies are remarkable for normal potassium. Other laboratory findings are consistent with end-stage renal disease. The patient will be admitted to the hospital for dialysis. I spoke with Dr. Najjar who is on-call for the patient's nephrologist. In addition patient will be admitted by the on-call hospitalist Dr. Tariq.    FINAL IMPRESSION  1. Noncompliance with renal dialysis (HCC)    2. Other hypervolemia    3. ESRD needing dialysis (HCC)    4. Acute cystitis without hematuria            Electronically signed by: Herson Jansen, 2017 6:35 AM

## 2017-01-04 NOTE — PROGRESS NOTES
1120 Assumed care, bedside report received from JOSETTE Juan. Patient's sitting up in bed.  Ordered PTT as per Heparin Protocol. Fall Protocol in effect. Call light within reach. Reminded patient to call for assist. No distress noted.    1140 NOD sp[oken to Nas, Pharmacist. Still awaiting for PTT result before Heparin can be started as per MD's order. IV Calcium Gluconate patent & infusing to left port chest.

## 2017-01-04 NOTE — PROGRESS NOTES
1240 Patient's crying and wanting more pain medication, pain medication isn't due yet. NOD notified Dr Tariq regarding patient's c/o's pain & wanting more pain medications, per the said MD whatever she had ordered is all she gets & won;t given her anymore. NOD notified the patient & stated that she wants MD to tell her that. NOD notified Mamie, Nurse Hospitalist regarding patient's c/o's pain.

## 2017-01-04 NOTE — H&P
PRIMARY CARE PROVIDER:  None.    NEPHROLOGIST:  Leana Shaver MD    RHEUMATOLOGIST:  Sherwin Collado MD    CHIEF COMPLAINT:  Abdominal pain, bilateral flank pain, dysuria, pyuria   ongoing for past 1 week.    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  The patient tells me that she has been having dysuria, pyuria,   and urgency with urination.  She denies any hematuria.  She states that she   has been febrile with a fever up to 103 over the last 2 days.  She usually   takes Tylenol which subsides her fevers.  She denies any vaginal discharge.    She denies any shortness of breath, chest pain.  She states that she has not   been going to her hemodialysis for the past several weeks as she finds her   hemodialysis chair is uncomfortable.  She tells me that she has fibromyalgia   and that prevents her from sitting still in a hemodialysis chair for the past   4 weeks.  We have discussed the severity of missing hemodialysis and   complications of it including volume overload.  She states that she   understands, but feels that her hemodialysis chair is too uncomfortable, and   need to find different options.  Otherwise, she will not return to   hemodialysis.    REVIEW OF SYSTEMS:  A comprehensive review of systems was conducted and all   pertinent positive and negative are documented in the HPI.    PAST MEDICAL HISTORY:  Significant for:  1.  Lupus nephritis.  2.  End-stage renal disease, on hemodialysis Monday, Wednesday, and Friday.  3.  Hypertension.  4.  Fibromyalgia.  5.  Chronic pain syndrome.    PAST SURGICAL HISTORY:  Significant for avascular necrosis of  bilateral hips,   right hip arthroplasty.    FAMILY HISTORY:  Denies any family history of cancer, heart disease, or   diabetes.    SOCIAL HISTORY:  Denies any alcohol, tobacco, or illicit drug use.    ALLERGIES:  PATIENT IS ALLERGIC TO MORPHINE, HAS SEASONAL ALLERGIES TO HAY   FEVER AND SAGEBRUSH.    HOME MEDICATIONS:  Vitamin C 1 tab daily, Wellbutrin- mg daily, PhosLo   667 mg 3 times a day, vitamin D3 of 10,000 units daily, Plaquenil 200 mg at   bedtime, lidocaine 5% patch to mid low back every 24 hours, oxycodone 10-20 mg   every 4 hours p.r.n., Lyrica 150 mg t.i.d., Phenergan 25 mg every 6 hours   p.r.n. for nausea or vomiting, multivitamin, Zanaflex 4 mg 2 tabs at bedtime,   trazodone 50 mg at bedtime, Warfarin 5 mg on Sunday, Monday, Wednesday,   Thursday, Friday, and 7.5 mg on Saturday and Tuesday.    PHYSICAL EXAMINATION:  VITAL SIGNS:  Temperature of 37.1, heart rate of 85, respirations of 18, blood   pressure of 179/99, O2 saturation 95% on room air.  GENERAL:  This is a 36-year-old female in no acute distress.  HEENT:  Normocephalic, atraumatic.  Pupils equal and reactive to light.    Extraocular motions are intact.  Moist oral mucosa noted.  No oral exudate or   erythema noted.  NECK:  Supple, normal range of motion.  CARDIOVASCULAR:  Regular rate and rhythm.  No murmurs, rubs or gallops noted.  LUNGS:  Clear to auscultation bilaterally.  No rhonchi, wheezes, or rales   heard.  ABDOMEN:  Tenderness to palpation in bilateral lower quadrants.  No rebound   tenderness or guarding noted.  Positive bilateral flank tenderness, worse on   the right compared to left.  Bowel sounds present, no organomegaly noted.  EXTREMITIES:  Patient has a well healed right arm fistula with palpable   thrill.  NEUROLOGIC:  Alert, awake and oriented x3.  No focal deficits noted.  Cranial   nerves II-XII are grossly intact.  PSYCHIATRIC:  Normal mood, normal affect, normal judgment.  SKIN:  Warm, dry, no erythema or rash  noted.    LABORATORY DATA:  WBC of 8, hemoglobin of 10.7, hematocrit 32.1, MCV of 96.4,   platelets of 140.  Lactic acid of 1.15.  UA shows small leukocyte esterase,   nitrite positive, WBC 20-50.  Troponin is less than 0.02.  , BUN of 69,   creatinine of 6.39, GFR of 7, calcium of 7.6, albumin of 3.6.    IMAGING STUDIES:  Chest x-ray shows no acute cardiopulmonary process.  EKG per   my read, sinus rhythm, rate of 78, QTC of 461, no acute ST or T wave   abnormalities noted.    ASSESSMENT AND PLAN:  1.  For patient's urinary tract infection, blood and urine cultures have been   ordered.  Patient is started on ceftriaxone and will modify medications based   on ID and sensitivities.  2.  For patient's end-stage renal disease, on hemodialysis Monday, Wednesday,   Friday.  Dr. Najjar from nephrology has been consulted.  3.  Medication noncompliance.  4.  Hypocalcemia.  We will replete and monitor patient closely.  5. Anemia of chronic disease.  We will continue to monitor.  6.  Systemic lupus erythematosus.  Continue patient on Plaquenil 200 mg daily.  7.  Chronic pain syndrome.  We will continue patient on Lyrica 150 mg t.i.d.   and oxycodone for breakthrough pain.  8.  Chronic anticoagulation to prevent graft closure.  Patient's INR is subtherapeutic and she will be started on heparin drip with coumadin bridge. Will continue to monitor patient's INR closely.  9.  Hypertension, the patient does not have a history of hypertension.  This   is likely secondary to medication noncompliance and missing several   hemodialysis.  The patient started on p.r.n. labetalol and hydralazine for   hypertensive episodes.  We will continue to monitor closely and consider   medications if patient continues to be hypertensive since her hospital stay.    DISPOSITION:  Observation.    PROPHYLAXIS:  Patient on warfarin for DVT prophylaxis.  No GI prophylaxis   indicated.  Bowel protocol initiated.    CODE STATUS:  Full.        ____________________________________     MD MS Dillan PENNY    DD:  01/04/2017 08:30:58  DT:  01/04/2017 09:13:32    D#:  [id number]  Job#:  [id number]

## 2017-01-04 NOTE — PROGRESS NOTES
1340 PTT result - 29.3. NOD notified Nas Phaalexandra. Will start Heparin weight based Protocol as per MD's order.

## 2017-01-05 NOTE — DISCHARGE SUMMARY
CHIEF COMPLAINT ON ADMISSION  Chief Complaint   Patient presents with   • Chronic Renal Failure     for the past 3 weeks flank pain bilaterally.   • Generalized Body Aches   • Painful Urination     for the past 3 weeks   • Extremity Weakness     pt states feeling dizzy before falling, for the past 3 weeks       CODE STATUS  Prior    HPI & HOSPITAL COURSE  Please refer to H&P done by myself. This is a 36 y.o. year old female here with end-stage renal   disease secondary to lupus nephritis, on hemodialysis on Monday, Wednesday,  and Friday, history of avascular necrosis with multiple right hip replacement  surgeries, fibromyalgia, chronic pain syndrome who presents today for urinary tract infection. She was started on IV ceftriaxone and blood/urine cultures were ordered. Dr. Najjar from nephrology was consulted to help manage patient's hemodialysis. Patient has not been going to hemodialysis for last 3 weeks as she feels it is uncomfortable to sit in her dialysis chair for 4 hours at a time. She decided to leave against medical advice when she was not provided with IV narcotics (Dilaudid) for her chronic pain.        SPECIFIC OUTPATIENT FOLLOW-UP  Nephrology as scheduled     DISCHARGE PROBLEM LIST  Active Problems:    SLE POA: Unknown    ESRD (end stage renal disease) on dialysis ([facility]) POA: Unknown    Medical non-compliance POA: Unknown    Urinary tract infection    Subtherapeutic INR     Hypertension    Hypocalcemia     Chronic Pain Syndrome    Drug Seeking Behavior   Resolved Problems:    * No resolved hospital problems. *        MEDICATIONS ON DISCHARGE   Debby Carrizales   Home Medication Instructions CONI:[id number]    Printed on:01/04/17 5677   Medication Information                      Ascorbic Acid (VITAMIN C PO)  Take 1 Tab by mouth every day.             buPROPion (WELLBUTRIN XL) 150 MG XL tablet  Take 150 mg by mouth every morning.             calcium acetate (PHOSLO) 667 MG Cap  Take 667 mg by mouth  3 times a day, with meals.             cholecalciferol (VITAMIN D3) 5000 UNIT Cap  Take 10,000 Units by mouth every day.             hydroxychloroquine (PLAQUENIL) 200 MG Tab  Take 200 mg by mouth every bedtime.             lidocaine (LIDODERM) 5 % Patch  Apply 1 Patch to skin as directed every 12 hours. Apply to mid back             Oxycodone HCl 20 MG Tab  Take 0.5-1 Tabs by mouth every four hours as needed (moderate to severe pain).             pregabalin (LYRICA) 150 MG Cap  Take 150 mg by mouth 3 times a day.             promethazine (PHENERGAN) 25 MG Tab  Take 25 mg by mouth every 6 hours as needed for Nausea/Vomiting.             therapeutic multivitamin-minerals (THERAGRAN-M) Tab  Take 1 Tab by mouth every day.             tizanidine (ZANAFLEX) 4 MG Tab  Take 2 Tabs by mouth every bedtime.             trazodone (DESYREL) 50 MG Tab  Take 1 Tab by mouth every bedtime.             warfarin (COUMADIN) 5 MG Tab  Take 5-75 mg by mouth every evening. Pt takes:  5MG on Sun,Mon,Wed,Thur, and Fri  7.5MG on Sat and Tue                 DIET  No orders of the defined types were placed in this encounter.       ACTIVITY  As tolerated.      CONSULTATIONS  Dr. Najjar - Nephrology     PROCEDURES  None     LABORATORY  Lab Results   Component Value Date/Time    SODIUM 138 01/04/2017 05:35 AM    POTASSIUM 4.0 01/04/2017 05:35 AM    CHLORIDE 110 01/04/2017 05:35 AM    CO2 18* 01/04/2017 05:35 AM    GLUCOSE 107* 01/04/2017 05:35 AM    BUN 69* 01/04/2017 05:35 AM    CREATININE 6.39* 01/04/2017 05:35 AM        Lab Results   Component Value Date/Time    WBC 8.0 01/04/2017 05:35 AM    HEMOGLOBIN 10.7* 01/04/2017 05:35 AM    HEMATOCRIT 32.1* 01/04/2017 05:35 AM    PLATELET COUNT 140* 01/04/2017 05:35 AM        Total time of the discharge process exceeds 38 minutes.

## 2017-01-06 LAB
BACTERIA UR CULT: ABNORMAL
BACTERIA UR CULT: ABNORMAL
SIGNIFICANT IND 70042: ABNORMAL
SITE SITE: ABNORMAL
SOURCE SOURCE: ABNORMAL

## 2017-01-09 LAB
BACTERIA BLD CULT: NORMAL
BACTERIA BLD CULT: NORMAL
SIGNIFICANT IND 70042: NORMAL
SIGNIFICANT IND 70042: NORMAL
SITE SITE: NORMAL
SITE SITE: NORMAL
SOURCE SOURCE: NORMAL
SOURCE SOURCE: NORMAL

## 2017-02-06 ENCOUNTER — RESOLUTE PROFESSIONAL BILLING HOSPITAL PROF FEE (OUTPATIENT)
Dept: HOSPITALIST | Facility: MEDICAL CENTER | Age: 37
End: 2017-02-06
Payer: MEDICARE

## 2017-02-06 ENCOUNTER — HOSPITAL ENCOUNTER (INPATIENT)
Facility: MEDICAL CENTER | Age: 37
LOS: 11 days | DRG: 683 | End: 2017-02-17
Attending: EMERGENCY MEDICINE | Admitting: HOSPITALIST
Payer: MEDICARE

## 2017-02-06 ENCOUNTER — APPOINTMENT (OUTPATIENT)
Dept: RADIOLOGY | Facility: MEDICAL CENTER | Age: 37
DRG: 683 | End: 2017-02-06
Attending: EMERGENCY MEDICINE
Payer: MEDICARE

## 2017-02-06 DIAGNOSIS — R11.2 NON-INTRACTABLE VOMITING WITH NAUSEA, UNSPECIFIED VOMITING TYPE: ICD-10-CM

## 2017-02-06 DIAGNOSIS — J02.9 SORE THROAT: ICD-10-CM

## 2017-02-06 DIAGNOSIS — R10.9 FLANK PAIN: ICD-10-CM

## 2017-02-06 DIAGNOSIS — E87.20 METABOLIC ACIDOSIS: ICD-10-CM

## 2017-02-06 DIAGNOSIS — N19 RENAL FAILURE: ICD-10-CM

## 2017-02-06 DIAGNOSIS — N39.0 URINARY TRACT INFECTION WITHOUT HEMATURIA, SITE UNSPECIFIED: ICD-10-CM

## 2017-02-06 DIAGNOSIS — E87.6 HYPOKALEMIA: ICD-10-CM

## 2017-02-06 DIAGNOSIS — D64.9 ANEMIA, UNSPECIFIED TYPE: ICD-10-CM

## 2017-02-06 PROBLEM — Z87.39 HISTORY OF LUPUS NEPHRITIS: Status: ACTIVE | Noted: 2017-02-06

## 2017-02-06 PROBLEM — Z99.2 ESRD ON DIALYSIS (HCC): Status: ACTIVE | Noted: 2017-02-06

## 2017-02-06 PROBLEM — I10 HYPERTENSION: Status: ACTIVE | Noted: 2017-02-06

## 2017-02-06 PROBLEM — N18.6 ESRD ON DIALYSIS (HCC): Status: ACTIVE | Noted: 2017-02-06

## 2017-02-06 LAB
ALBUMIN SERPL BCP-MCNC: 3.4 G/DL (ref 3.2–4.9)
ALBUMIN/GLOB SERPL: 1 G/DL
ALP SERPL-CCNC: 65 U/L (ref 30–99)
ALT SERPL-CCNC: 6 U/L (ref 2–50)
AMORPH CRY #/AREA URNS HPF: PRESENT /HPF
ANION GAP SERPL CALC-SCNC: 18 MMOL/L (ref 0–11.9)
APPEARANCE UR: ABNORMAL
AST SERPL-CCNC: 8 U/L (ref 12–45)
BACTERIA #/AREA URNS HPF: ABNORMAL /HPF
BASOPHILS # BLD AUTO: 0.6 % (ref 0–1.8)
BASOPHILS # BLD: 0.06 K/UL (ref 0–0.12)
BILIRUB SERPL-MCNC: 0.3 MG/DL (ref 0.1–1.5)
BILIRUB UR QL STRIP.AUTO: NEGATIVE
BUN SERPL-MCNC: 95 MG/DL (ref 8–22)
CALCIUM SERPL-MCNC: 8.9 MG/DL (ref 8.5–10.5)
CHLORIDE SERPL-SCNC: 107 MMOL/L (ref 96–112)
CO2 SERPL-SCNC: 11 MMOL/L (ref 20–33)
COLOR UR: YELLOW
CREAT SERPL-MCNC: 10.25 MG/DL (ref 0.5–1.4)
EOSINOPHIL # BLD AUTO: 0.14 K/UL (ref 0–0.51)
EOSINOPHIL NFR BLD: 1.3 % (ref 0–6.9)
EPI CELLS #/AREA URNS HPF: ABNORMAL /HPF
ERYTHROCYTE [DISTWIDTH] IN BLOOD BY AUTOMATED COUNT: 47.9 FL (ref 35.9–50)
GFR SERPL CREATININE-BSD FRML MDRD: 4 ML/MIN/1.73 M 2
GLOBULIN SER CALC-MCNC: 3.5 G/DL (ref 1.9–3.5)
GLUCOSE SERPL-MCNC: 104 MG/DL (ref 65–99)
GLUCOSE UR STRIP.AUTO-MCNC: NEGATIVE MG/DL
HAV IGM SERPL QL IA: NEGATIVE
HBV CORE IGM SER QL: NEGATIVE
HBV SURFACE AB SERPL IA-ACNC: <3.1 MIU/ML (ref 0–10)
HBV SURFACE AG SER QL: NEGATIVE
HCT VFR BLD AUTO: 29 % (ref 37–47)
HCV AB SER QL: NEGATIVE
HGB BLD-MCNC: 9.2 G/DL (ref 12–16)
IMM GRANULOCYTES # BLD AUTO: 0.15 K/UL (ref 0–0.11)
IMM GRANULOCYTES NFR BLD AUTO: 1.4 % (ref 0–0.9)
INR PPP: 1.23 (ref 0.87–1.13)
KETONES UR STRIP.AUTO-MCNC: NEGATIVE MG/DL
LACTATE BLD-SCNC: 1.4 MMOL/L (ref 0.5–2)
LEUKOCYTE ESTERASE UR QL STRIP.AUTO: ABNORMAL
LYMPHOCYTES # BLD AUTO: 1.49 K/UL (ref 1–4.8)
LYMPHOCYTES NFR BLD: 14.1 % (ref 22–41)
MAGNESIUM SERPL-MCNC: 2.3 MG/DL (ref 1.5–2.5)
MCH RBC QN AUTO: 30.6 PG (ref 27–33)
MCHC RBC AUTO-ENTMCNC: 31.7 G/DL (ref 33.6–35)
MCV RBC AUTO: 96.3 FL (ref 81.4–97.8)
MICRO URNS: ABNORMAL
MONOCYTES # BLD AUTO: 1.28 K/UL (ref 0–0.85)
MONOCYTES NFR BLD AUTO: 12.1 % (ref 0–13.4)
MUCOUS THREADS #/AREA URNS HPF: ABNORMAL /HPF
NEUTROPHILS # BLD AUTO: 7.43 K/UL (ref 2–7.15)
NEUTROPHILS NFR BLD: 70.5 % (ref 44–72)
NITRITE UR QL STRIP.AUTO: NEGATIVE
NRBC # BLD AUTO: 0 K/UL
NRBC BLD AUTO-RTO: 0 /100 WBC
PH UR STRIP.AUTO: 7 [PH]
PLATELET # BLD AUTO: 245 K/UL (ref 164–446)
PMV BLD AUTO: 10.1 FL (ref 9–12.9)
POTASSIUM SERPL-SCNC: 3.3 MMOL/L (ref 3.6–5.5)
PROT SERPL-MCNC: 6.9 G/DL (ref 6–8.2)
PROT UR QL STRIP: 100 MG/DL
PROTHROMBIN TIME: 15.9 SEC (ref 12–14.6)
RBC # BLD AUTO: 3.01 M/UL (ref 4.2–5.4)
RBC # URNS HPF: ABNORMAL /HPF
RBC UR QL AUTO: NEGATIVE
SODIUM SERPL-SCNC: 136 MMOL/L (ref 135–145)
SP GR UR STRIP.AUTO: 1.01
WBC # BLD AUTO: 10.6 K/UL (ref 4.8–10.8)
WBC #/AREA URNS HPF: ABNORMAL /HPF

## 2017-02-06 PROCEDURE — 770020 HCHG ROOM/CARE - TELE (206)

## 2017-02-06 PROCEDURE — 81025 URINE PREGNANCY TEST: CPT

## 2017-02-06 PROCEDURE — 700111 HCHG RX REV CODE 636 W/ 250 OVERRIDE (IP): Performed by: HOSPITALIST

## 2017-02-06 PROCEDURE — 99223 1ST HOSP IP/OBS HIGH 75: CPT | Mod: AI | Performed by: HOSPITALIST

## 2017-02-06 PROCEDURE — 94760 N-INVAS EAR/PLS OXIMETRY 1: CPT

## 2017-02-06 PROCEDURE — 87077 CULTURE AEROBIC IDENTIFY: CPT

## 2017-02-06 PROCEDURE — 85610 PROTHROMBIN TIME: CPT

## 2017-02-06 PROCEDURE — 700105 HCHG RX REV CODE 258: Performed by: HOSPITALIST

## 2017-02-06 PROCEDURE — 74176 CT ABD & PELVIS W/O CONTRAST: CPT

## 2017-02-06 PROCEDURE — 83605 ASSAY OF LACTIC ACID: CPT

## 2017-02-06 PROCEDURE — 36415 COLL VENOUS BLD VENIPUNCTURE: CPT

## 2017-02-06 PROCEDURE — 700102 HCHG RX REV CODE 250 W/ 637 OVERRIDE(OP): Performed by: INTERNAL MEDICINE

## 2017-02-06 PROCEDURE — 700101 HCHG RX REV CODE 250: Performed by: HOSPITALIST

## 2017-02-06 PROCEDURE — 87040 BLOOD CULTURE FOR BACTERIA: CPT | Mod: 91

## 2017-02-06 PROCEDURE — 87186 SC STD MICRODIL/AGAR DIL: CPT

## 2017-02-06 PROCEDURE — 5A1D60Z PERFORMANCE OF URINARY FILTRATION, MULTIPLE: ICD-10-PCS | Performed by: INTERNAL MEDICINE

## 2017-02-06 PROCEDURE — 700105 HCHG RX REV CODE 258: Performed by: EMERGENCY MEDICINE

## 2017-02-06 PROCEDURE — 80074 ACUTE HEPATITIS PANEL: CPT

## 2017-02-06 PROCEDURE — A9270 NON-COVERED ITEM OR SERVICE: HCPCS | Performed by: HOSPITALIST

## 2017-02-06 PROCEDURE — 80053 COMPREHEN METABOLIC PANEL: CPT

## 2017-02-06 PROCEDURE — A9270 NON-COVERED ITEM OR SERVICE: HCPCS | Performed by: INTERNAL MEDICINE

## 2017-02-06 PROCEDURE — 700111 HCHG RX REV CODE 636 W/ 250 OVERRIDE (IP): Performed by: NURSE PRACTITIONER

## 2017-02-06 PROCEDURE — 86706 HEP B SURFACE ANTIBODY: CPT

## 2017-02-06 PROCEDURE — 96365 THER/PROPH/DIAG IV INF INIT: CPT

## 2017-02-06 PROCEDURE — 700111 HCHG RX REV CODE 636 W/ 250 OVERRIDE (IP): Performed by: EMERGENCY MEDICINE

## 2017-02-06 PROCEDURE — 83735 ASSAY OF MAGNESIUM: CPT

## 2017-02-06 PROCEDURE — 87086 URINE CULTURE/COLONY COUNT: CPT

## 2017-02-06 PROCEDURE — 81001 URINALYSIS AUTO W/SCOPE: CPT

## 2017-02-06 PROCEDURE — 700102 HCHG RX REV CODE 250 W/ 637 OVERRIDE(OP): Performed by: HOSPITALIST

## 2017-02-06 PROCEDURE — 85025 COMPLETE CBC W/AUTO DIFF WBC: CPT

## 2017-02-06 PROCEDURE — 99285 EMERGENCY DEPT VISIT HI MDM: CPT

## 2017-02-06 PROCEDURE — 96375 TX/PRO/DX INJ NEW DRUG ADDON: CPT

## 2017-02-06 PROCEDURE — 90935 HEMODIALYSIS ONE EVALUATION: CPT

## 2017-02-06 PROCEDURE — 700111 HCHG RX REV CODE 636 W/ 250 OVERRIDE (IP)

## 2017-02-06 RX ORDER — HEPARIN SODIUM 1000 [USP'U]/ML
2000 INJECTION, SOLUTION INTRAVENOUS; SUBCUTANEOUS
Status: DISCONTINUED | OUTPATIENT
Start: 2017-02-06 | End: 2017-02-17 | Stop reason: HOSPADM

## 2017-02-06 RX ORDER — OXYCODONE HYDROCHLORIDE 10 MG/1
10 TABLET ORAL EVERY 4 HOURS PRN
Status: DISCONTINUED | OUTPATIENT
Start: 2017-02-06 | End: 2017-02-17 | Stop reason: HOSPADM

## 2017-02-06 RX ORDER — PREGABALIN 150 MG/1
150 CAPSULE ORAL 3 TIMES DAILY
Status: DISCONTINUED | OUTPATIENT
Start: 2017-02-06 | End: 2017-02-06

## 2017-02-06 RX ORDER — WARFARIN SODIUM 5 MG/1
5-75 TABLET ORAL EVERY EVENING
Status: DISCONTINUED | OUTPATIENT
Start: 2017-02-06 | End: 2017-02-06

## 2017-02-06 RX ORDER — WARFARIN SODIUM 5 MG/1
5 TABLET ORAL
Status: DISCONTINUED | OUTPATIENT
Start: 2017-02-08 | End: 2017-02-08

## 2017-02-06 RX ORDER — OXYCODONE HYDROCHLORIDE 10 MG/1
20 TABLET ORAL EVERY 4 HOURS PRN
Status: DISCONTINUED | OUTPATIENT
Start: 2017-02-06 | End: 2017-02-17 | Stop reason: HOSPADM

## 2017-02-06 RX ORDER — PREGABALIN 25 MG/1
50 CAPSULE ORAL
Status: DISCONTINUED | OUTPATIENT
Start: 2017-02-06 | End: 2017-02-17 | Stop reason: HOSPADM

## 2017-02-06 RX ORDER — POTASSIUM CHLORIDE 20 MEQ/1
20 TABLET, EXTENDED RELEASE ORAL DAILY
Status: DISCONTINUED | OUTPATIENT
Start: 2017-02-06 | End: 2017-02-17 | Stop reason: HOSPADM

## 2017-02-06 RX ORDER — ONDANSETRON 2 MG/ML
4 INJECTION INTRAMUSCULAR; INTRAVENOUS ONCE
Status: COMPLETED | OUTPATIENT
Start: 2017-02-06 | End: 2017-02-06

## 2017-02-06 RX ORDER — HYDROXYCHLOROQUINE SULFATE 200 MG/1
200 TABLET, FILM COATED ORAL
Status: DISCONTINUED | OUTPATIENT
Start: 2017-02-06 | End: 2017-02-17 | Stop reason: HOSPADM

## 2017-02-06 RX ORDER — PROMETHAZINE HYDROCHLORIDE 25 MG/1
12.5-25 SUPPOSITORY RECTAL EVERY 4 HOURS PRN
Status: DISCONTINUED | OUTPATIENT
Start: 2017-02-06 | End: 2017-02-17 | Stop reason: HOSPADM

## 2017-02-06 RX ORDER — WARFARIN SODIUM 7.5 MG/1
7.5 TABLET ORAL
Status: DISCONTINUED | OUTPATIENT
Start: 2017-02-07 | End: 2017-02-08

## 2017-02-06 RX ORDER — POTASSIUM CHLORIDE 7.45 MG/ML
10 INJECTION INTRAVENOUS
Status: ACTIVE | OUTPATIENT
Start: 2017-02-06 | End: 2017-02-06

## 2017-02-06 RX ORDER — BUPROPION HYDROCHLORIDE 150 MG/1
150 TABLET, EXTENDED RELEASE ORAL EVERY MORNING
Status: DISCONTINUED | OUTPATIENT
Start: 2017-02-06 | End: 2017-02-17 | Stop reason: HOSPADM

## 2017-02-06 RX ORDER — LIDOCAINE HYDROCHLORIDE 10 MG/ML
1 INJECTION, SOLUTION INFILTRATION; PERINEURAL PRN
Status: DISCONTINUED | OUTPATIENT
Start: 2017-02-06 | End: 2017-02-17 | Stop reason: HOSPADM

## 2017-02-06 RX ORDER — DEXTROSE MONOHYDRATE 25 G/50ML
25 INJECTION, SOLUTION INTRAVENOUS
Status: DISCONTINUED | OUTPATIENT
Start: 2017-02-06 | End: 2017-02-17 | Stop reason: HOSPADM

## 2017-02-06 RX ORDER — PROMETHAZINE HYDROCHLORIDE 25 MG/1
25 TABLET ORAL EVERY 6 HOURS PRN
Status: DISCONTINUED | OUTPATIENT
Start: 2017-02-06 | End: 2017-02-06

## 2017-02-06 RX ORDER — ONDANSETRON 4 MG/1
4 TABLET, ORALLY DISINTEGRATING ORAL EVERY 4 HOURS PRN
Status: DISCONTINUED | OUTPATIENT
Start: 2017-02-06 | End: 2017-02-17 | Stop reason: HOSPADM

## 2017-02-06 RX ORDER — HEPARIN SODIUM 1000 [USP'U]/ML
INJECTION, SOLUTION INTRAVENOUS; SUBCUTANEOUS
Status: COMPLETED
Start: 2017-02-06 | End: 2017-02-06

## 2017-02-06 RX ORDER — POTASSIUM CHLORIDE 1.5 G/1.58G
20 POWDER, FOR SOLUTION ORAL DAILY
Status: DISCONTINUED | OUTPATIENT
Start: 2017-02-06 | End: 2017-02-17 | Stop reason: HOSPADM

## 2017-02-06 RX ORDER — LIDOCAINE 50 MG/G
1 PATCH TOPICAL EVERY 24 HOURS
Status: DISCONTINUED | OUTPATIENT
Start: 2017-02-06 | End: 2017-02-17 | Stop reason: HOSPADM

## 2017-02-06 RX ORDER — ONDANSETRON 2 MG/ML
4 INJECTION INTRAMUSCULAR; INTRAVENOUS EVERY 4 HOURS PRN
Status: DISCONTINUED | OUTPATIENT
Start: 2017-02-06 | End: 2017-02-17 | Stop reason: HOSPADM

## 2017-02-06 RX ORDER — PROMETHAZINE HYDROCHLORIDE 25 MG/1
12.5-25 TABLET ORAL EVERY 4 HOURS PRN
Status: DISCONTINUED | OUTPATIENT
Start: 2017-02-06 | End: 2017-02-17 | Stop reason: HOSPADM

## 2017-02-06 RX ORDER — TRAZODONE HYDROCHLORIDE 50 MG/1
50 TABLET ORAL
Status: DISCONTINUED | OUTPATIENT
Start: 2017-02-06 | End: 2017-02-17 | Stop reason: HOSPADM

## 2017-02-06 RX ORDER — PREGABALIN 25 MG/1
25 CAPSULE ORAL EVERY MORNING
Status: DISCONTINUED | OUTPATIENT
Start: 2017-02-06 | End: 2017-02-17 | Stop reason: HOSPADM

## 2017-02-06 RX ORDER — WARFARIN SODIUM 7.5 MG/1
7.5 TABLET ORAL
Status: COMPLETED | OUTPATIENT
Start: 2017-02-06 | End: 2017-02-06

## 2017-02-06 RX ORDER — TIZANIDINE 4 MG/1
4 TABLET ORAL
Status: DISCONTINUED | OUTPATIENT
Start: 2017-02-06 | End: 2017-02-17 | Stop reason: HOSPADM

## 2017-02-06 RX ORDER — CALCIUM ACETATE 667 MG/1
667 TABLET ORAL
Status: DISCONTINUED | OUTPATIENT
Start: 2017-02-06 | End: 2017-02-17 | Stop reason: HOSPADM

## 2017-02-06 RX ORDER — OXYCODONE HYDROCHLORIDE 20 MG/1
10-20 TABLET ORAL EVERY 4 HOURS PRN
Status: DISCONTINUED | OUTPATIENT
Start: 2017-02-06 | End: 2017-02-06

## 2017-02-06 RX ORDER — HEPARIN SODIUM 5000 [USP'U]/ML
5000 INJECTION, SOLUTION INTRAVENOUS; SUBCUTANEOUS EVERY 8 HOURS
Status: DISCONTINUED | OUTPATIENT
Start: 2017-02-06 | End: 2017-02-11

## 2017-02-06 RX ADMIN — OXYCODONE HYDROCHLORIDE 20 MG: 10 TABLET ORAL at 16:04

## 2017-02-06 RX ADMIN — LIDOCAINE 1 PATCH: 50 PATCH CUTANEOUS at 21:01

## 2017-02-06 RX ADMIN — HYDROXYCHLOROQUINE SULFATE 200 MG: 200 TABLET ORAL at 20:58

## 2017-02-06 RX ADMIN — TIZANIDINE 4 MG: 4 TABLET ORAL at 21:01

## 2017-02-06 RX ADMIN — HEPARIN SODIUM 2000 UNITS: 1000 INJECTION, SOLUTION INTRAVENOUS; SUBCUTANEOUS at 15:12

## 2017-02-06 RX ADMIN — CEFEPIME 2 G: 2 INJECTION, POWDER, FOR SOLUTION INTRAVENOUS at 11:06

## 2017-02-06 RX ADMIN — SODIUM BICARBONATE: 84 INJECTION, SOLUTION INTRAVENOUS at 18:40

## 2017-02-06 RX ADMIN — HYDROMORPHONE HYDROCHLORIDE 0.5 MG: 1 INJECTION, SOLUTION INTRAMUSCULAR; INTRAVENOUS; SUBCUTANEOUS at 07:59

## 2017-02-06 RX ADMIN — OXYCODONE HYDROCHLORIDE 20 MG: 10 TABLET ORAL at 21:01

## 2017-02-06 RX ADMIN — PREGABALIN 50 MG: 25 CAPSULE ORAL at 20:59

## 2017-02-06 RX ADMIN — Medication 667 MG: at 18:30

## 2017-02-06 RX ADMIN — HYDROMORPHONE HYDROCHLORIDE 0.5 MG: 1 INJECTION, SOLUTION INTRAMUSCULAR; INTRAVENOUS; SUBCUTANEOUS at 23:42

## 2017-02-06 RX ADMIN — ONDANSETRON 4 MG: 2 INJECTION, SOLUTION INTRAMUSCULAR; INTRAVENOUS at 07:58

## 2017-02-06 RX ADMIN — OXYCODONE HYDROCHLORIDE 10 MG: 10 TABLET ORAL at 11:48

## 2017-02-06 RX ADMIN — WARFARIN SODIUM 7.5 MG: 7.5 TABLET ORAL at 21:00

## 2017-02-06 RX ADMIN — HEPARIN SODIUM 5000 UNITS: 5000 INJECTION, SOLUTION INTRAVENOUS; SUBCUTANEOUS at 18:43

## 2017-02-06 RX ADMIN — ONDANSETRON 4 MG: 2 INJECTION, SOLUTION INTRAMUSCULAR; INTRAVENOUS at 23:46

## 2017-02-06 RX ADMIN — TRAZODONE HYDROCHLORIDE 50 MG: 50 TABLET ORAL at 21:00

## 2017-02-06 RX ADMIN — POTASSIUM CHLORIDE 20 MEQ: 1500 TABLET, EXTENDED RELEASE ORAL at 18:30

## 2017-02-06 ASSESSMENT — PAIN SCALES - GENERAL
PAINLEVEL_OUTOF10: 9
PAINLEVEL_OUTOF10: 9
PAINLEVEL_OUTOF10: 8

## 2017-02-06 ASSESSMENT — COPD QUESTIONNAIRES
DURING THE PAST 4 WEEKS HOW MUCH DID YOU FEEL SHORT OF BREATH: SOME OF THE TIME
COPD SCREENING SCORE: 5
DO YOU EVER COUGH UP ANY MUCUS OR PHLEGM?: YES, A FEW DAYS A WEEK OR MONTH
HAVE YOU SMOKED AT LEAST 100 CIGARETTES IN YOUR ENTIRE LIFE: YES

## 2017-02-06 ASSESSMENT — LIFESTYLE VARIABLES: EVER_SMOKED: YES

## 2017-02-06 NOTE — IP AVS SNAPSHOT
" Home Care Instructions                                                                                                                  Name:Debby Carrizales  Medical Record Number:[id number]  CSN: [id number]    YOB: 1980   Age: 36 y.o.  Sex: female  HT:1.651 m (5' 5\") WT: 82.6 kg (182 lb 1.6 oz)          Admit Date: 2/6/2017     Discharge Date:   Today's Date: 2/17/2017  Attending Doctor:  Alvaro Gaviria M.D.                  Allergies:  Morphine and Seasonal            Discharge Instructions       Discharge Instructions    Discharged to home by car with relative. Discharged via wheelchair, hospital escort: Yes.  Special equipment needed: Not Applicable    Be sure to schedule a follow-up appointment with your primary care doctor or any specialists as instructed.     Discharge Plan:   Diet Plan: Discussed  Activity Level: Discussed  Confirmed Follow up Appointment: Patient to Call and Schedule Appointment  Confirmed Symptoms Management: Discussed  Medication Reconciliation Updated: Yes  Influenza Vaccine Indication: Patient Refuses    I understand that a diet low in cholesterol, fat, and sodium is recommended for good health. Unless I have been given specific instructions below for another diet, I accept this instruction as my diet prescription.   Other diet:     Special Instructions: None    Diet: Low potassium renal healhty with 9-13 servings of fruits and vegetables  Activities as tolerated  Follow up with PCP in 7-10 days, call for appointment  No smoking, no alcohol, no caffeine  Wear seat belt when driving  Take medications as prescribed  Keep appointments  If symptoms worstens call PCP, 911 or urgent care      · Is patient discharged on Warfarin / Coumadin?   Yes    You are receiving the drug warfarin. Please understand the importance of monitoring warfarin with scheduled PT/INR blood draws.  Follow-up with a call to your personal Doctor's office in 3 days to schedule a PT/INR. " ".    IMPORTANT: HOW TO USE THIS INFORMATION:  This is a summary and does NOT have all possible information about this product. This information does not assure that this product is safe, effective, or appropriate for you. This information is not individual medical advice and does not substitute for the advice of your health care professional. Always ask your health care professional for complete information about this product and your specific health needs.      WARFARIN - ORAL (WARF-uh-rin)      COMMON BRAND NAME(S): Coumadin      WARNING:  Warfarin can cause very serious (possibly fatal) bleeding. This is more likely to occur when you first start taking this medication or if you take too much warfarin. To decrease your risk for bleeding, your doctor or other health care provider will monitor you closely and check your lab results (INR test) to make sure you are not taking too much warfarin. Keep all medical and laboratory appointments. Tell your doctor right away if you notice any signs of serious bleeding. See also Side Effects section.      USES:  This medication is used to treat blood clots (such as in deep vein thrombosis-DVT or pulmonary embolus-PE) and/or to prevent new clots from forming in your body. Preventing harmful blood clots helps to reduce the risk of a stroke or heart attack. Conditions that increase your risk of developing blood clots include a certain type of irregular heart rhythm (atrial fibrillation), heart valve replacement, recent heart attack, and certain surgeries (such as hip/knee replacement). Warfarin is commonly called a \"blood thinner,\" but the more correct term is \"anticoagulant.\" It helps to keep blood flowing smoothly in your body by decreasing the amount of certain substances (clotting proteins) in your blood.      HOW TO USE:  Read the Medication Guide provided by your pharmacist before you start taking warfarin and each time you get a refill. If you have any questions, ask your " doctor or pharmacist. Take this medication by mouth with or without food as directed by your doctor or other health care professional, usually once a day. It is very important to take it exactly as directed. Do not increase the dose, take it more frequently, or stop using it unless directed by your doctor. Dosage is based on your medical condition, laboratory tests (such as INR), and response to treatment. Your doctor or other health care provider will monitor you closely while you are taking this medication to determine the right dose for you. Use this medication regularly to get the most benefit from it. To help you remember, take it at the same time each day. It is important to eat a balanced, consistent diet while taking warfarin. Some foods can affect how warfarin works in your body and may affect your treatment and dose. Avoid sudden large increases or decreases in your intake of foods high in vitamin K (such as broccoli, cauliflower, cabbage, brussels sprouts, kale, spinach, and other green leafy vegetables, liver, green tea, certain vitamin supplements). If you are trying to lose weight, check with your doctor before you try to go on a diet. Cranberry products may also affect how your warfarin works. Limit the amount of cranberry juice (16 ounces/480 milliliters a day) or other cranberry products you may drink or eat.      SIDE EFFECTS:  Nausea, loss of appetite, or stomach/abdominal pain may occur. If any of these effects persist or worsen, tell your doctor or pharmacist promptly. Remember that your doctor has prescribed this medication because he or she has judged that the benefit to you is greater than the risk of side effects. Many people using this medication do not have serious side effects. This medication can cause serious bleeding if it affects your blood clotting proteins too much (shown by unusually high INR lab results). Even if your doctor stops your medication, this risk of bleeding can  continue for up to a week. Tell your doctor right away if you have any signs of serious bleeding, including: unusual pain/swelling/discomfort, unusual/easy bruising, prolonged bleeding from cuts or gums, persistent/frequent nosebleeds, unusually heavy/prolonged menstrual flow, pink/dark urine, coughing up blood, vomit that is bloody or looks like coffee grounds, severe headache, dizziness/fainting, unusual or persistent tiredness/weakness, bloody/black/tarry stools, chest pain, shortness of breath, difficulty swallowing. Tell your doctor right away if any of these unlikely but serious side effects occur: persistent nausea/vomiting, severe stomach/abdominal pain, yellowing eyes/skin. This drug rarely has caused very serious (possibly fatal) problems if its effects lead to small blood clots (usually at the beginning of treatment). This can lead to severe skin/tissue damage that may require surgery or amputation if left untreated. Patients with certain blood conditions (protein C or S deficiency) may be at greater risk. Get medical help right away if any of these rare but serious side effects occur: painful/red/purplish patches on the skin (such as on the toe, breast, abdomen), change in the amount of urine, vision changes, confusion, slurred speech, weakness on one side of the body. A very serious allergic reaction to this drug is rare. However, get medical help right away if you notice any symptoms of a serious allergic reaction, including: rash, itching/swelling (especially of the face/tongue/throat), severe dizziness, trouble breathing. This is not a complete list of possible side effects. If you notice other effects not listed above, contact your doctor or pharmacist. In the [place] - Call your doctor for medical advice about side effects. You may report side effects to FDA at 0-331-FDA-4498. In [place] - Call your doctor for medical advice about side effects. You may report side effects to [employer] at  6-023-962-9074.      PRECAUTIONS:  Before taking warfarin, tell your doctor or pharmacist if you are allergic to it; or if you have any other allergies. This product may contain inactive ingredients, which can cause allergic reactions or other problems. Talk to your pharmacist for more details. Before using this medication, tell your doctor or pharmacist your medical history, especially of: blood disorders (such as anemia, hemophilia), bleeding problems (such as bleeding of the stomach/intestines, bleeding in the brain), blood vessel disorders (such as aneurysms), recent major injury/surgery, liver disease, alcohol use, mental/mood disorders (including memory problems), frequent falls/injuries. It is important that all your doctors and dentists know that you take warfarin. Before having surgery or any medical/dental procedures, tell your doctor or dentist that you are taking this medication and about all the products you use (including prescription drugs, nonprescription drugs, and herbal products). Avoid getting injections into the muscles. If you must have an injection into a muscle (for example, a flu shot), it should be given in the arm. This way, it will be easier to check for bleeding and/or apply pressure bandages. This medication may cause stomach bleeding. Daily use of alcohol while using this medicine will increase your risk for stomach bleeding and may also affect how this medication works. Limit or avoid alcoholic beverages. If you have not been eating well, if you have an illness or infection that causes fever, vomiting, or diarrhea for more than 2 days, or if you start using any antibiotic medications, contact your doctor or pharmacist immediately because these conditions can affect how warfarin works. This medication can cause heavy bleeding. To lower the chance of getting cut, bruised, or injured, use great caution with sharp objects like safety razors and nail cutters. Use an electric razor when  shaving and a soft toothbrush when brushing your teeth. Avoid activities such as contact sports. If you fall or injure yourself, especially if you hit your head, call your doctor immediately. Your doctor may need to check you. The Food & Drug Administration has stated that generic warfarin products are interchangeable. However, consult your doctor or pharmacist before switching warfarin products. Be careful not to take more than one medication that contains warfarin unless specifically directed by the doctor or health care provider who is monitoring your warfarin treatment. Older adults may be at greater risk for bleeding while using this drug. This medication is not recommended for use during pregnancy because of serious (possibly fatal) harm to an unborn baby. Discuss the use of reliable forms of birth control with your doctor. If you become pregnant or think you may be pregnant, tell your doctor immediately. If you are planning pregnancy, discuss a plan for managing your condition with your doctor before you become pregnant. Your doctor may switch the type of medication you use during pregnancy. Very small amounts of this medication may pass into breast milk but is unlikely to harm a nursing infant. Consult your doctor before breast-feeding.      DRUG INTERACTIONS:  Drug interactions may change how your medications work or increase your risk for serious side effects. This document does not contain all possible drug interactions. Keep a list of all the products you use (including prescription/nonprescription drugs and herbal products) and share it with your doctor and pharmacist. Do not start, stop, or change the dosage of any medicines without your doctor's approval. Warfarin interacts with many prescription, nonprescription, vitamin, and herbal products. This includes medications that are applied to the skin or inside the vagina or rectum. The interactions with warfarin usually result in an increase or decrease  "in the \"blood-thinning\" (anticoagulant) effect. Your doctor or other health care professional should closely monitor you to prevent serious bleeding or clotting problems. While taking warfarin, it is very important to tell your doctor or pharmacist of any changes in medications, vitamins, or herbal products that you are taking. Some products that may interact with this drug include: capecitabine, imatinib, mifepristone. Aspirin, aspirin-like drugs (salicylates), and nonsteroidal anti-inflammatory drugs (NSAIDs such as ibuprofen, naproxen, celecoxib) may have effects similar to warfarin. These drugs may increase the risk of bleeding problems if taken during treatment with warfarin. Carefully check all prescription/nonprescription product labels (including drugs applied to the skin such as pain-relieving creams) since the products may contain NSAIDs or salicylates. Talk to your doctor about using a different medication (such as acetaminophen) to treat pain/fever. Low-dose aspirin and related drugs (such as clopidogrel, ticlopidine) should be continued if prescribed by your doctor for specific medical reasons such as heart attack or stroke prevention. Consult your doctor or pharmacist for more details. Many herbal products interact with warfarin. Tell your doctor before taking any herbal products, especially bromelains, coenzyme Q10, cranberry, danshen, dong quai, fenugreek, garlic, ginkgo biloba, ginseng, and Carver's wort, among others. This medication may interfere with a certain laboratory test to measure theophylline levels, possibly causing false test results. Make sure laboratory personnel and all your doctors know you use this drug.      OVERDOSE:  If overdose is suspected, contact a poison control center or emergency room immediately. [place] residents can call the [place] National Poison Hotline at 1-290.755.6589. [place] residents can call a provincial poison control center. Symptoms of overdose may include: " bloody/black/tarry stools, pink/dark urine, unusual/prolonged bleeding.      NOTES:  Do not share this medication with others. Laboratory and/or medical tests (such as INR, complete blood count) must be performed periodically to monitor your progress or check for side effects. Consult your doctor for more details.      MISSED DOSE:  For the best possible benefit, do not miss any doses. If you do miss a dose and remember on the same day, take it as soon as you remember. If you remember on the next day, skip the missed dose and resume your usual dosing schedule. Do not double the dose to catch up because this could increase your risk for bleeding. Keep a record of missed doses to give to your doctor or pharmacist. Contact your doctor or pharmacist if you miss 2 or more doses in a row.      STORAGE:  Store at room temperature away from light and moisture. Do not store in the bathroom. Keep all medications away from children and pets. Do not flush medications down the toilet or pour them into a drain unless instructed to do so. Properly discard this product when it is  or no longer needed. Consult your pharmacist or local waste disposal company for more details about how to safely discard your product.      MEDICAL ALERT:  Your condition and medication can cause complications in a medical emergency. For information about enrolling in MedicAlert, call 1-681.728.9267 ([place]) or 1-149.285.8402 ([place]).      Information last revised 2010 Copyright(c)  [employer]             · Is patient Post Blood Transfusion?  No  Depression / Suicide Risk    As you are discharged from this [facility] facility, it is important to learn how to keep safe from harming yourself.    Recognize the warning signs:  · Abrupt changes in personality, positive or negative- including increase in energy   · Giving away possessions  · Change in eating patterns- significant weight changes-  positive or negative  · Change in  sleeping patterns- unable to sleep or sleeping all the time   · Unwillingness or inability to communicate  · Depression  · Unusual sadness, discouragement and loneliness  · Talk of wanting to die  · Neglect of personal appearance   · Rebelliousness- reckless behavior  · Withdrawal from people/activities they love  · Confusion- inability to concentrate     If you or a loved one observes any of these behaviors or has concerns about self-harm, here's what you can do:  · Talk about it- your feelings and reasons for harming yourself  · Remove any means that you might use to hurt yourself (examples: pills, rope, extension cords, firearm)  · Get professional help from the community (Mental Health, Substance Abuse, psychological counseling)  · Do not be alone:Call your Safe Contact- someone whom you trust who will be there for you.  · Call your local CRISIS HOTLINE 189-5479 or 402-281-1646  · Call your local Children's Mobile Crisis Response Team [place] (771) 812-7053 or www.[employer]  · Call the toll free National Suicide Prevention Hotlines   · National Suicide Prevention Lifeline 052-688-JRHU (5388)  · National Hope Line Network 800-SUICIDE (871-4506)        Follow-up Information     1. Follow up with Sushila Manzano M.D.. Schedule an appointment as soon as possible for a visit in 2 weeks.    Specialty:  Family Medicine    Why:  Hospital follow-up appointment with PCP    Contact information    [address] [facility] [address]  [place] [address] [phone]          2. Follow up with Sherwin Collado M.D. In 2 weeks.    Specialty:  Rheumatology    Why:  Admission for lupus and kidney related issues    Contact information    [address]  [place] [address]  [phone]           Discharge Medication Instructions:    Below are the medications your physician expects you to take upon discharge:    Review all your home medications and newly ordered medications with your doctor and/or  pharmacist. Follow medication instructions as directed by your doctor and/or pharmacist.    Please keep your medication list with you and share with your physician.               Medication List      START taking these medications        Instructions    omeprazole 20 MG delayed-release capsule   Last time this was given:  20 mg on 2/17/2017  9:21 AM   Commonly known as:  PRILOSEC    Take 1 Cap by mouth every day.   Dose:  20 mg       predniSONE 5 MG Tabs   Last time this was given:  35 mg on 2/17/2017  9:35 AM   Commonly known as:  DELTASONE    Doctor's comments:  Taper down by 5 mg every 2 weeks and instructed by her rheumatologist.   Take 7 Tabs by mouth every day for 30 days.   Dose:  35 mg         CHANGE how you take these medications        Instructions    * Oxycodone HCl 20 MG Tabs   What changed:  Another medication with the same name was added. Make sure you understand how and when to take each.   Last time this was given:  20 mg on 2/17/2017  7:46 AM    Take 0.5-1 Tabs by mouth every four hours as needed (moderate to severe pain).   Dose:  10-20 mg       * OxyCODONE HCl ER 30 MG T12a   What changed:  You were already taking a medication with the same name, and this prescription was added. Make sure you understand how and when to take each.   Last time this was given:  30 mg on 2/17/2017  7:45 AM    Take 30 mg by mouth every 12 hours for 28 doses.   Dose:  30 mg       * Notice:  This list has 2 medication(s) that are the same as other medications prescribed for you. Read the directions carefully, and ask your doctor or other care provider to review them with you.      CONTINUE taking these medications        Instructions    cholecalciferol 5000 UNIT Caps   Commonly known as:  VITAMIN D3    Take 10,000 Units by mouth every day.   Dose:  30775 Units       hydroxychloroquine 200 MG Tabs   Last time this was given:  200 mg on 2/16/2017  8:38 PM   Commonly known as:  PLAQUENIL    Take 200 mg by mouth every  bedtime.   Dose:  200 mg       lidocaine 5 % Ptch   Last time this was given:  1 Patch on 2/16/2017  8:38 PM   Commonly known as:  LIDODERM    Apply 1 Patch to skin as directed every 12 hours. Apply to mid back   Dose:  1 Patch       PHOSLO 667 MG Caps   Generic drug:  calcium acetate    Take 667 mg by mouth 3 times a day, with meals.   Dose:  667 mg       pregabalin 150 MG Caps   Last time this was given:  25 mg on 2/17/2017  9:22 AM   Commonly known as:  LYRICA    Take 150 mg by mouth 3 times a day.   Dose:  150 mg       promethazine 25 MG Tabs   Last time this was given:  25 mg on 2/16/2017 11:34 AM   Commonly known as:  PHENERGAN    Take 25 mg by mouth every 6 hours as needed for Nausea/Vomiting.   Dose:  25 mg       therapeutic multivitamin-minerals Tabs    Take 1 Tab by mouth every day.   Dose:  1 Tab       tizanidine 4 MG Tabs   Last time this was given:  4 mg on 2/16/2017  8:38 PM   Commonly known as:  ZANAFLEX    Take 2 Tabs by mouth every bedtime.   Dose:  8 mg       trazodone 50 MG Tabs   Last time this was given:  50 mg on 2/16/2017  8:38 PM   Commonly known as:  DESYREL    Take 1 Tab by mouth every bedtime.   Dose:  50 mg       VITAMIN C PO    Take 1 Tab by mouth every day.   Dose:  1 Tab       warfarin 5 MG Tabs   Last time this was given:  5 mg on 2/16/2017  7:10 PM   Commonly known as:  COUMADIN    Take 5-7.5 mg by mouth every evening. Pt takes: 5MG on Sun,Mon,Wed,Thur, and Fri 7.5MG on Sat and Tue   Dose:  5-7.5 mg       WELLBUTRIN  MG XL tablet   Generic drug:  buPROPion    Take 150 mg by mouth every morning.   Dose:  150 mg               Instructions           Diet / Nutrition:    Follow any diet instructions given to you by your doctor or the dietician, including how much salt (sodium) you are allowed each day.    If you are overweight, talk to your doctor about a weight reduction plan.    Activity:    Remain physically active following your doctor's instructions about exercise and  activity.    Rest often.     Any time you become even a little tired or short of breath, SIT DOWN and rest.    Worsening Symptoms:    Report any of the following signs and symptoms to the doctor's office immediately:    *Pain of jaw, arm, or neck  *Chest pain not relieved by medication                               *Dizziness or loss of consciousness  *Difficulty breathing even when at rest   *More tired than usual                                       *Bleeding drainage or swelling of surgical site  *Swelling of feet, ankles, legs or stomach                 *Fever (>100ºF)  *Pink or blood tinged sputum  *Weight gain (3lbs/day or 5lbs /week)           *Shock from internal defibrillator (if applicable)  *Palpitations or irregular heartbeats                *Cool and/or numb extremities    Stroke Awareness    Common Risk Factors for Stroke include:    Age  Atrial Fibrillation  Carotid Artery Stenosis  Diabetes Mellitus  Excessive alcohol consumption  High blood pressure  Overweight   Physical inactivity  Smoking    Warning signs and symptoms of a stroke include:    *Sudden numbness or weakness of the face, arm or leg (especially on one side of the body).  *Sudden confusion, trouble speaking or understanding.  *Sudden trouble seeing in one or both eyes.  *Sudden trouble walking, dizziness, loss of balance or coordination.Sudden severe headache with no known cause.    It is very important to get treatment quickly when a stroke occurs. If you experience any of the above warning signs, call 911 immediately.                   Disclaimer         Quit Smoking / Tobacco Use:    I understand the use of any tobacco products increases my chance of suffering from future heart disease or stroke and could cause other illnesses which may shorten my life. Quitting the use of tobacco products is the single most important thing I can do to improve my health. For further information on smoking / tobacco cessation call a Toll Free Quit  Line at 1-966.770.5557 (*National [employer]) or 1-354.673.5198 (American Lung Association) or you can access the web based program at www.lungusa.org.    [place] Tobacco Users Help Line:  (879) 676-8272       Toll Free: 1-880.619.1373  Quit Tobacco Program [facility] (590)225-6311    Crisis Hotline:    [place] Crisis Hotline:  8-383-DPMSIUG or 1-447.423.4275    [place] Crisis Hotline:    1-950.895.3949 or 419-002-8811    Discharge Survey:   Thank you for choosing [facility]. We hope we did everything we could to make your hospital stay a pleasant one. You may be receiving a phone survey and we would appreciate your time and participation in answering the questions. Your input is very valuable to us in our efforts to improve our service to our patients and their families.        My signature on this form indicates that:    1. I have reviewed and understand the above information.  2. My questions regarding this information have been answered to my satisfaction.  3. I have formulated a plan with my discharge nurse to obtain my prescribed medications for home.                  Disclaimer         __________________________________                     __________       ________                       Patient Signature                                                 Date                    Time

## 2017-02-06 NOTE — PROGRESS NOTES
Inpatient Anticoagulation Service Note    Date: 2017  Reason for Anticoagulation: Deep Vein Thrombosis        Inpatient Anticoagulation Service Note    Date: 2017  Reason for Anticoagulation: Deep Vein Thrombosis        Hemoglobin Value: 9.2  Hematocrit Value: 29  Lab Platelet Value: 245  Target INR: 2.0 to 3.0    INR from last 7 days     Date/Time INR Value    17 1012 (!)1.23        Dose from last 7 days     Date/Time Dose (mg)    17 0800 7.5        Average Dose (mg): 5.7 (7.5mg /Tuesday; 5mg AOD)  Significant Interactions: Antibiotics (wellbutrin;)  Bridge Therapy: No     Comments:  Comments:   1. Pt on VKA for a history of VTE   Noted history of SLE   Suspect re-initiation of HD during this admission   Last VTE was 2015  2. Drug interactions   Noted above  3. Hematology   Cell lines are lower than personal controls; however, pt is in acute on chronic renal failure and on hydroxychloroquine  4. Plan   Will order 7.5mg tonight, and then resume the patient's outpatient regimen as detailed above   INR with AM labs    Education Material Provided?: No  Pharmacist suggested discharge dosin.5mg tonight, followed by: 7.5mg /Tuesday; 5mg AOD. Repeat INR within 96 hours of DC     Shanita Zambrano, PharmD, BCPS

## 2017-02-06 NOTE — DISCHARGE PLANNING
Patient was previously on hemodialysis at [facility]. Per clinic, patient decided to discontinue dialysis and broke all communication with their staff, as well as her nephrologist. Her last OP dialysis was October, 31, 2016. At this time, she has no established dialysis. Will meet with patient to determine needs.

## 2017-02-06 NOTE — H&P
CHIEF COMPLAINT:  Malaise, generalized body aches, right-sided flank   discomfort, shortness of breath x1 week.    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 Patient also has   chronic pain syndrome and fibromyalgia.  Essentially, missed dialysis for the   past 1-2 weeks, presents to the ER feeling malaised, mildly short of breath,   right-sided flank pain over the past month.  Otherwise, denies having any   fevers, chills, nausea, or vomiting.  On admission labs, patient appeared to   have severe metabolic acidosis and metabolic derangement due to noncompliance   with her dialysis.  Patient states this is very hard for her to get to and   from 3 times a week.  Patient otherwise denies having any fevers or chills,   chest pain at this time.    REVIEW OF SYSTEMS:  Please see HPI above, all other systems are negative per   AMA/CMS criteria.    ALLERGIES:  PATIENT IS ALLERGIC TO MORPHINE, SEASONAL ALLERGIES AND SAGEBRUSH.    PAST MEDICAL HISTORY:  1.  Lupus nephritis.  2.  End-stage renal disease, on hemodialysis Monday, Wednesday, and Friday   with [facility].  3.  Fibromyalgia.  4.  Hypertension.  5.  Chronic pain syndrome.    SOCIAL HISTORY:  Patient denies using tobacco, alcohol, or illicit drugs.    FAMILY HISTORY:  Reviewed and noncontributory.    MEDICATIONS AT HOME:  Include vitamin C daily, Wellbutrin- mg daily,   calcium acetate 667 mg 3 times a daily, vitamin D3, Plaquenil 200 mg every   bedtime, oxycodone 20 mg daily every 4 hours, Lyrica 150 mg 3 times a day,   tizanidine 4 mg 2 tabs every bedtime, trazodone 50 mg at bedtime, Coumadin.    PHYSICAL EXAMINATION:  VITAL SIGNS:   Temperature 99.5 degrees Fahrenheit, pulse 122, respirations 16,   blood pressure 159/105, SpO2 97% on room air.  CONSTITUTIONAL:  Patient appears generally malaised, no apparent distress,   nontoxic appearance.  HEENT:  Normocephalic and atraumatic.  Pupils equal and reactive to light.    Nonicteric.  Mucous membranes moist.  NECK:  Supple.  No thyromegaly.  No JVD.  No stridor.  CARDIOVASCULAR:  Tachycardic, otherwise normal rhythm.  No gallops, rubs or   murmurs appreciated.  LUNGS:  Bibasilar atelectasis noted or wheezing.  ABDOMEN:  Soft, nontender, nondistended.  Positive bowel sounds.  Mild right   flank tenderness on percussion.  SKIN:  Warm and dry.  No erythema.  No rashes.  EXTREMITIES:  Distal pulses intact, +2.  No cyanosis, clubbing, or edema.    Patient has a right arm fistula, which has palpable thrill.  Range of motion   in all 4 extremities within normal limits.  NEUROLOGIC:  Alert and oriented x3.  Cranial nerves II-XII grossly intact.  No   focal deficits.  PSYCHIATRIC:  Affect, judgment, mood is normal.    LABORATORY DATA:  CBC:  WBC count 10.6, hemoglobin 9.2, hematocrit 29.0,   platelet count 245.  Chemistry:  Sodium 136, potassium 3.3, chloride 107, CO2   11, anion gap 18, BUN _____, creatinine 10.25.    IMAGING:  EKG shows sinus rhythm, no acute ST or T changes.    ASSESSMENT AND PLAN:  1.  Metabolic acidosis secondary to noncompliance with dialysis.  At this   time, kidney care has been consulted, I appreciate their recommendations, most   likely, we will anticipate emergent dialysis.  2.  Generalized fatigue, most likely due to noncompliance with her dialysis at   this time.  Patient also has noted urinary tract infection.  She does make a   little bit of urine.  Given low-grade fever of 99.5, at this time, we will   initiate the sepsis protocol with IV cefepime.  Based on previous cultures, we   will await for new cultures as well.  3.  Normocytic anemia due to end-stage renal disease.   No acute signs of   bleeding.  Continue to follow daily CBCs for acute findings.  4.  History of lupus nephritis as well as systemic SLE.  Continue Plaquenil   and continue Coumadin for DVT prophylaxis.  5.  Hypokalemia.  We will replenish electrolytes.  Most likely, we will   normalize after dialysis.  6.  Medical noncompliance.  I emphasized the importance of dialysis.  Patient   missed her dialysis over a week, which is dangerous in her situation.  At this   time, patient will be placed on heparin 5000 units t.i.d. for DVT   prophylaxis, docusate for GI prophylaxis.  Patient's code status is a full   code.  I anticipate hospital length of stay will be greater than 2 midnights.       ____________________________________     MD BRITTNI HARDIN / ISSAC    DD:  02/06/2017 08:43:09  DT:  02/06/2017 11:17:47    D#:  [id number]  Job#:  [id number]

## 2017-02-06 NOTE — IP AVS SNAPSHOT
Chatty Access Code: [email or web address]  Expires: 2/19/2017 10:29 AM    Chatty  A secure, online tool to manage your health information     [employer]’s Chatty® is a secure, online tool that connects you to your personalized health information from the privacy of your home -- day or night - making it very easy for you to manage your healthcare. Once the activation process is completed, you can even access your medical information using the Chatty ayaan, which is available for free in the Apple Ayaan store or Google Play store.     Chatty provides the following levels of access (as shown below):   My Chart Features   [facility] Primary Care Doctor [facility]  Specialists [facility]  Urgent  Care Non-[facility]  Primary Care  Doctor   Email your healthcare team securely and privately 24/7 X X X X   Manage appointments: schedule your next appointment; view details of past/upcoming appointments X      Request prescription refills. X      View recent personal medical records, including lab and immunizations X X X X   View health record, including health history, allergies, medications X X X X   Read reports about your outpatient visits, procedures, consult and ER notes X X X X   See your discharge summary, which is a recap of your hospital and/or ER visit that includes your diagnosis, lab results, and care plan. X X       How to register for Chatty:  1. Go to  [email or web address].  2. Click on the Sign Up Now box, which takes you to the New Member Sign Up page. You will need to provide the following information:  a. Enter your Chatty Access Code exactly as it appears at the top of this page. (You will not need to use this code after you’ve completed the sign-up process. If you do not sign up before the expiration date, you must request a new code.)   b. Enter your date of birth.   c. Enter your home email address.   d. Click Submit, and follow the next screen’s instructions.  3. Create a Chatty ID. This will be your Chatty  login ID and cannot be changed, so think of one that is secure and easy to remember.  4. Create a [employer]otz password. You can change your password at any time.  5. Enter your Password Reset Question and Answer. This can be used at a later time if you forget your password.   6. Enter your e-mail address. This allows you to receive e-mail notifications when new information is available in Thotz.  7. Click Sign Up. You can now view your health information.    For assistance activating your Thotz account, call [phone]

## 2017-02-06 NOTE — IP AVS SNAPSHOT
2/17/2017          Debby Carrizales  [address]  [place] [address]    Dear Debby:    [facility] wants to ensure your discharge home is safe and you or your loved ones have had all your questions answered regarding your care after you leave the hospital.    You may receive a telephone call within two days of your discharge.  This call is to make certain you understand your discharge instructions as well as ensure we provided you with the best care possible during your stay with us.     The call will only last approximately 3-5 minutes and will be done by a nurse.    Once again, we want to ensure your discharge home is safe and that you have a clear understanding of any next steps in your care.  If you have any questions or concerns, please do not hesitate to contact us, we are here for you.  Thank you for choosing [facility] for your healthcare needs.    Sincerely,    Bam Holman    [facility]

## 2017-02-06 NOTE — ED NOTES
Debby Carrizales  Chief Complaint   Patient presents with   • Generalized Body Aches     x 1 month,  increasing over last week   • Sore Throat     x 1 month.    • Flank Pain     (B) R >L side,  denies dysuria.  hx of endstage kidney disease and lupus.  has not been to dialysis in over a month.    • N/V     Pt ambulatory to triage with above complaint.  Sepsis score of 3 noted,  Protocol initiated, charge RN notified.

## 2017-02-06 NOTE — ED PROVIDER NOTES
ED Provider Note    CHIEF COMPLAINT  Chief Complaint   Patient presents with   • Generalized Body Aches     x 1 month,  increasing over last week   • Sore Throat     x 1 month.    • Flank Pain     (B) R >L side,  denies dysuria.  hx of endstage kidney disease and lupus.  has not been to dialysis in over a month.    • N/V       HPI  Debby Carrizales is a 36 y.o. female who presents with sore throat, for the last month, intermittent fevers, right flank pain for the last month. Nausea and vomiting for 3 weeks last vomiting in emergency department no diarrhea. No chest pain no abdominal extremity pain no headache no neck pain.     REVIEW OF SYSTEMS  See HPI for further details. History of lupus fibromyalgia hypertension avascular necrosis both ears total hip, arthritis depression in her tract infections renal insufficiency. Evidently she has renal insufficiency point dialysis. Does have a dialysis catheter left chest and a shunt right arm. All other systems are negative.     PAST MEDICAL HISTORY  Past Medical History   Diagnosis Date   • Lupus (CMS-HCC)    • Fibromyalgia    • Hypertension    • Backpain    • Avascular necrosis    • Renal failure      stage 4 per pt   • Heart burn    • Arthritis      all joints,r/t lupus   • Psychiatric disorder      anxiety, depression   • UTI (urinary tract infection) 4/4/2013   • Clostridium difficile colitis 5/3/2011   • Pneumonia    • Dialysis patient        FAMILY HISTORY  History reviewed. No pertinent family history.    SOCIAL HISTORY  Social History     Social History   • Marital Status: Single     Spouse Name: N/A   • Number of Children: N/A   • Years of Education: N/A     Social History Main Topics   • Smoking status: Current Every Day Smoker -- 0.50 packs/day for 18 years     Types: Cigarettes     Last Attempt to Quit: 06/13/2011   • Smokeless tobacco: Never Used      Comment: 1/2 ppd   • Alcohol Use: No      Comment: occ   • Drug Use: No   • Sexual Activity:  Not Asked     Other Topics Concern   • None     Social History Narrative       SURGICAL HISTORY  Past Surgical History   Procedure Laterality Date   • Gastroscopy-endo  4/10/2011     Performed by SYED JURADO at [facility]   • Sclerotheraphy  4/10/2011     Performed by SYED JURADO at [facility]   • Gastroscopy-endo  4/18/2011     Performed by ALCIRA CRUZ at [facility]   • Colonoscopy with biopsy  4/20/2011     Performed by ALCIRA CRUZ at [facility]   • Gastroscopy-endo  4/27/2011     Performed by PALMIRA DOAN at [facility]   • Other  5/2011     tracheostomy   • Other abdominal surgery       kidney biopsy   • Av fistula creation  9/9/2014     Performed by Shabbir Ardon M.D. at [facility]   • Cath placement  9/9/2014     Performed by Shabbir Ardon M.D. at [facility]   • Av fistula creation  11/14/2014     Performed by Shabbir Ardon M.D. at [facility]   • Av fistula creation  2/3/2015     Performed by Shabbir Ardon M.D. at [facility]   • Hip arthroplasty total Right 1/18/2016     Procedure: HIP ARTHROPLASTY TOTAL;  Surgeon: Michael Holman M.D.;  Location: [facility];  Service:    • Closed reduction Right 7/5/2016     Procedure: CLOSED REDUCTION- Hip ;  Surgeon: Michael Holman M.D.;  Location: [facility];  Service:    • Av fistula creation Right 7/12/2016     Procedure: AV FISTULA CREATION WITH GRAFT BRACHIAL AXILLARY;  Surgeon: Shabbir Ardon M.D.;  Location: [facility];  Service:    • Thrombectomy Right 8/20/2016     Procedure: THROMBECTOMY AV GRAFT;  Surgeon: Shabbir Ardon M.D.;  Location: [facility];  Service:    • Thrombectomy Right 8/21/2016     Procedure: THROMBECTOMY - right AV fistula graft with grams;  Surgeon: Shabbir Ardon M.D.;  Location: [facility];   "Service:    • Angioplasty balloon  8/21/2016     Procedure: ANGIOPLASTY BALLOON;  Surgeon: Shabbir Ardon M.D.;  [facility]: [facility];  Service:    • Tracheostomy         CURRENT MEDICATIONS  Home Medications     **Home medications have not yet been reviewed for this encounter**          ALLERGIES  Allergies   Allergen Reactions   • Morphine Itching     Tolerates Dilaudid   • Seasonal Runny Nose and Itching     Hay fever, sabiha brush       PHYSICAL EXAM  VITAL SIGNS: /105 mmHg  Pulse 110  Temp(Src) 37.5 °C (99.5 °F)  Resp 21  Ht 1.651 m (5' 5\")  Wt 75.7 kg (166 lb 14.2 oz)  BMI 27.77 kg/m2  SpO2 95%  LMP 01/18/2017 (Exact Date)  Constitutional: Well developed, Well nourished, moderately obese No acute distress, Non-toxic appearance.   HENT: Normocephalic, Atraumatic, Bilateral external ears normal, Oropharynx moist, No oral exudates, Nose normal.   Eyes: PERRL, EOMI, Conjunctiva normal, No discharge.   Neck: Normal range of motion, No tenderness, Supple, No stridor.   Lymphatic: No lymphadenopathy noted.   Cardiovascular: Normal heart rate, Normal rhythm, No murmurs, No rubs, No gallops.   Thorax & Lungs: Normal breath sounds, No respiratory distress, No wheezing, No chest tenderness dialysis catheter, left chest.   Abdomen:  No tenderness, no guarding no rigidity and the abdomen is soft.  No masses, No pulsatile masses.  Skin: Warm, Dry, No erythema, No rash.   Back: No tenderness, No CVA tenderness.   Extremities: Intact distal pulses, No edema, No tenderness, No cyanosis, No clubbing. Shunt, right arm  Musculoskeletal: Good range of motion in all major joints. No tenderness to palpation or major deformities noted.   Neurologic: Alert & oriented x 3, Normal motor function, Normal sensory function, No focal deficits noted.   Psychiatric: Affect normal, Judgment normal, Mood normal.       RADIOLOGY/PROCEDURES      COURSE & MEDICAL DECISION MAKING  Pertinent Labs & Imaging studies " reviewed. (See chart for details) urinalysis, 100/150 white cells.  Normal electrolytes, potassium low at 3.3 bicarb of 11 BUN and creatinine markedly elevated, creatinine 10.25. Glucose 104. White count normal hematocrit 29 platelet count 245. Lactic acid 1.4.      I think her metabolic acidosis probably secondary to uremia, although cannot rule out sepsis. She is given cefepime for urinary tract infection, as she has 100/150 white cells in her urine. Her potassium, 323 is specifically concerning with a bicarbonate of 11. Concern for total body potassium depletion. Initial potassium intravenous here in the emergency department. I have talked with the hospitalist about admission, kidney care, about scheduling dialysis and consultation  FINAL IMPRESSION  1.   1. Sore throat    2. Flank pain    3. Non-intractable vomiting with nausea, unspecified vomiting type    4. Renal failure    5. Metabolic acidosis    6. Hypokalemia    7. Anemia, unspecified type    8. Urinary tract infection without hematuria, site unspecified        2.   3.     Disposition  I have talked with hospitalist about admission kidney care about consultation.  Medical care time time spent with this patient excluding procedures, 40 minutes    Electronically signed by: Binu Khan, 2/6/2017 7:29 AM

## 2017-02-06 NOTE — IP AVS SNAPSHOT
" <p align=\"LEFT\"><IMG SRC=\"//[email or web address]$/[email or web address]\" alt=\"Image\" WIDTH=\"50%\" HEIGHT=\"200\" BORDER=\"\"></p>                   Name:eDbby Carrizales  Medical Record Number:[id number]  CSN: [id number]    YOB: 1980   Age: 36 y.o.  Sex: female  HT:1.651 m (5' 5\") WT: 82.6 kg (182 lb 1.6 oz)          Admit Date: 2/6/2017     Discharge Date:   Today's Date: 2/17/2017  Attending Doctor:  Alvaro Gaviria M.D.                  Allergies:  Morphine and Seasonal          Follow-up Information     1. Follow up with Sushila Manzano M.D.. Schedule an appointment as soon as possible for a visit in 2 weeks.    Specialty:  Family Medicine    Why:  Hospital follow-up appointment with PCP    Contact information    [address]  [place] [id number] [phone]          2. Follow up with Sherwin Collado M.D. In 2 weeks.    Specialty:  Rheumatology    Why:  Admission for lupus and kidney related issues    Contact information    [address]  [place] [address]  [phone]           Medication List      Take these Medications        Instructions    cholecalciferol 5000 UNIT Caps   Commonly known as:  VITAMIN D3    Take 10,000 Units by mouth every day.   Dose:  53402 Units       hydroxychloroquine 200 MG Tabs   Commonly known as:  PLAQUENIL    Take 200 mg by mouth every bedtime.   Dose:  200 mg       lidocaine 5 % Ptch   Commonly known as:  LIDODERM    Apply 1 Patch to skin as directed every 12 hours. Apply to mid back   Dose:  1 Patch       omeprazole 20 MG delayed-release capsule   Commonly known as:  PRILOSEC    Take 1 Cap by mouth every day.   Dose:  20 mg       * Oxycodone HCl 20 MG Tabs   What changed:  Another medication with the same name was added. Make sure you understand how and when to take each.    Take 0.5-1 Tabs by mouth every four hours as needed (moderate to severe pain).   Dose:  10-20 mg       * OxyCODONE HCl ER 30 MG T12a   What changed:  You were already " taking a medication with the same name, and this prescription was added. Make sure you understand how and when to take each.    Take 30 mg by mouth every 12 hours for 28 doses.   Dose:  30 mg       PHOSLO 667 MG Caps   Generic drug:  calcium acetate    Take 667 mg by mouth 3 times a day, with meals.   Dose:  667 mg       predniSONE 5 MG Tabs   Commonly known as:  DELTASONE    Doctor's comments:  Taper down by 5 mg every 2 weeks and instructed by her rheumatologist.   Take 7 Tabs by mouth every day for 30 days.   Dose:  35 mg       pregabalin 150 MG Caps   Commonly known as:  LYRICA    Take 150 mg by mouth 3 times a day.   Dose:  150 mg       promethazine 25 MG Tabs   Commonly known as:  PHENERGAN    Take 25 mg by mouth every 6 hours as needed for Nausea/Vomiting.   Dose:  25 mg       therapeutic multivitamin-minerals Tabs    Take 1 Tab by mouth every day.   Dose:  1 Tab       tizanidine 4 MG Tabs   Commonly known as:  ZANAFLEX    Take 2 Tabs by mouth every bedtime.   Dose:  8 mg       trazodone 50 MG Tabs   Commonly known as:  DESYREL    Take 1 Tab by mouth every bedtime.   Dose:  50 mg       VITAMIN C PO    Take 1 Tab by mouth every day.   Dose:  1 Tab       warfarin 5 MG Tabs   Commonly known as:  COUMADIN    Take 5-7.5 mg by mouth every evening. Pt takes: 5MG on Sun,Mon,Wed,Thur, and Fri 7.5MG on Sat and Tue   Dose:  5-7.5 mg       WELLBUTRIN  MG XL tablet   Generic drug:  buPROPion    Take 150 mg by mouth every morning.   Dose:  150 mg       * Notice:  This list has 2 medication(s) that are the same as other medications prescribed for you. Read the directions carefully, and ask your doctor or other care provider to review them with you.

## 2017-02-07 ENCOUNTER — APPOINTMENT (OUTPATIENT)
Dept: RADIOLOGY | Facility: MEDICAL CENTER | Age: 37
DRG: 683 | End: 2017-02-07
Attending: HOSPITALIST
Payer: MEDICARE

## 2017-02-07 PROBLEM — N39.0 UTI (URINARY TRACT INFECTION): Status: ACTIVE | Noted: 2017-02-07

## 2017-02-07 PROBLEM — R10.9 ABDOMINAL PAIN: Status: ACTIVE | Noted: 2017-02-07

## 2017-02-07 PROBLEM — D68.32 HEMORRHAGIC DISORDER DUE TO EXTRINSIC CIRCULATING ANTICOAGULANTS (HCC): Status: ACTIVE | Noted: 2017-02-07

## 2017-02-07 LAB
ALBUMIN SERPL BCP-MCNC: 3.3 G/DL (ref 3.2–4.9)
ALBUMIN/GLOB SERPL: 0.9 G/DL
ALP SERPL-CCNC: 60 U/L (ref 30–99)
ALT SERPL-CCNC: <5 U/L (ref 2–50)
ANION GAP SERPL CALC-SCNC: 12 MMOL/L (ref 0–11.9)
AST SERPL-CCNC: 9 U/L (ref 12–45)
BASOPHILS # BLD AUTO: 0.8 % (ref 0–1.8)
BASOPHILS # BLD: 0.07 K/UL (ref 0–0.12)
BILIRUB SERPL-MCNC: 0.4 MG/DL (ref 0.1–1.5)
BUN SERPL-MCNC: 45 MG/DL (ref 8–22)
CALCIUM SERPL-MCNC: 8.9 MG/DL (ref 8.5–10.5)
CHLORIDE SERPL-SCNC: 102 MMOL/L (ref 96–112)
CO2 SERPL-SCNC: 23 MMOL/L (ref 20–33)
CREAT SERPL-MCNC: 5.63 MG/DL (ref 0.5–1.4)
DEPRECATED D DIMER PPP IA-ACNC: 6699 NG/ML(D-DU)
EOSINOPHIL # BLD AUTO: 0.09 K/UL (ref 0–0.51)
EOSINOPHIL NFR BLD: 1 % (ref 0–6.9)
ERYTHROCYTE [DISTWIDTH] IN BLOOD BY AUTOMATED COUNT: 45.5 FL (ref 35.9–50)
GFR SERPL CREATININE-BSD FRML MDRD: 9 ML/MIN/1.73 M 2
GLOBULIN SER CALC-MCNC: 3.5 G/DL (ref 1.9–3.5)
GLUCOSE SERPL-MCNC: 76 MG/DL (ref 65–99)
HCT VFR BLD AUTO: 29.1 % (ref 37–47)
HGB BLD-MCNC: 9.7 G/DL (ref 12–16)
IMM GRANULOCYTES # BLD AUTO: 0.1 K/UL (ref 0–0.11)
IMM GRANULOCYTES NFR BLD AUTO: 1.2 % (ref 0–0.9)
INR PPP: 1.2 (ref 0.87–1.13)
LIPASE SERPL-CCNC: 32 U/L (ref 11–82)
LYMPHOCYTES # BLD AUTO: 1.58 K/UL (ref 1–4.8)
LYMPHOCYTES NFR BLD: 18.2 % (ref 22–41)
MCH RBC QN AUTO: 31 PG (ref 27–33)
MCHC RBC AUTO-ENTMCNC: 33.3 G/DL (ref 33.6–35)
MCV RBC AUTO: 93 FL (ref 81.4–97.8)
MONOCYTES # BLD AUTO: 1.26 K/UL (ref 0–0.85)
MONOCYTES NFR BLD AUTO: 14.5 % (ref 0–13.4)
NEUTROPHILS # BLD AUTO: 5.59 K/UL (ref 2–7.15)
NEUTROPHILS NFR BLD: 64.3 % (ref 44–72)
NRBC # BLD AUTO: 0 K/UL
NRBC BLD AUTO-RTO: 0 /100 WBC
PLATELET # BLD AUTO: 235 K/UL (ref 164–446)
PMV BLD AUTO: 10.1 FL (ref 9–12.9)
POTASSIUM SERPL-SCNC: 3.3 MMOL/L (ref 3.6–5.5)
PROT SERPL-MCNC: 6.8 G/DL (ref 6–8.2)
PROTHROMBIN TIME: 15.6 SEC (ref 12–14.6)
RBC # BLD AUTO: 3.13 M/UL (ref 4.2–5.4)
SODIUM SERPL-SCNC: 137 MMOL/L (ref 135–145)
WBC # BLD AUTO: 8.7 K/UL (ref 4.8–10.8)

## 2017-02-07 PROCEDURE — 700102 HCHG RX REV CODE 250 W/ 637 OVERRIDE(OP): Performed by: HOSPITALIST

## 2017-02-07 PROCEDURE — A9270 NON-COVERED ITEM OR SERVICE: HCPCS | Performed by: INTERNAL MEDICINE

## 2017-02-07 PROCEDURE — 85379 FIBRIN DEGRADATION QUANT: CPT

## 2017-02-07 PROCEDURE — 700101 HCHG RX REV CODE 250: Performed by: HOSPITALIST

## 2017-02-07 PROCEDURE — 700111 HCHG RX REV CODE 636 W/ 250 OVERRIDE (IP): Performed by: HOSPITALIST

## 2017-02-07 PROCEDURE — 99233 SBSQ HOSP IP/OBS HIGH 50: CPT | Performed by: HOSPITALIST

## 2017-02-07 PROCEDURE — 83690 ASSAY OF LIPASE: CPT

## 2017-02-07 PROCEDURE — 700117 HCHG RX CONTRAST REV CODE 255: Performed by: HOSPITALIST

## 2017-02-07 PROCEDURE — 770020 HCHG ROOM/CARE - TELE (206)

## 2017-02-07 PROCEDURE — 85610 PROTHROMBIN TIME: CPT

## 2017-02-07 PROCEDURE — 71275 CT ANGIOGRAPHY CHEST: CPT

## 2017-02-07 PROCEDURE — 700102 HCHG RX REV CODE 250 W/ 637 OVERRIDE(OP): Performed by: INTERNAL MEDICINE

## 2017-02-07 PROCEDURE — 700105 HCHG RX REV CODE 258

## 2017-02-07 PROCEDURE — 700105 HCHG RX REV CODE 258: Performed by: HOSPITALIST

## 2017-02-07 PROCEDURE — 85025 COMPLETE CBC W/AUTO DIFF WBC: CPT

## 2017-02-07 PROCEDURE — A9270 NON-COVERED ITEM OR SERVICE: HCPCS | Performed by: HOSPITALIST

## 2017-02-07 PROCEDURE — 80053 COMPREHEN METABOLIC PANEL: CPT

## 2017-02-07 PROCEDURE — 700111 HCHG RX REV CODE 636 W/ 250 OVERRIDE (IP): Performed by: NURSE PRACTITIONER

## 2017-02-07 PROCEDURE — 36415 COLL VENOUS BLD VENIPUNCTURE: CPT

## 2017-02-07 RX ORDER — LIDOCAINE 50 MG/G
1 PATCH TOPICAL EVERY 24 HOURS
Status: COMPLETED | OUTPATIENT
Start: 2017-02-07 | End: 2017-02-08

## 2017-02-07 RX ORDER — SODIUM CHLORIDE 9 MG/ML
INJECTION, SOLUTION INTRAVENOUS
Status: COMPLETED
Start: 2017-02-07 | End: 2017-02-07

## 2017-02-07 RX ADMIN — HYDROXYCHLOROQUINE SULFATE 200 MG: 200 TABLET ORAL at 21:04

## 2017-02-07 RX ADMIN — POTASSIUM CHLORIDE 20 MEQ: 1.5 POWDER, FOR SOLUTION ORAL at 08:24

## 2017-02-07 RX ADMIN — Medication 667 MG: at 17:12

## 2017-02-07 RX ADMIN — Medication 667 MG: at 08:24

## 2017-02-07 RX ADMIN — PREGABALIN 50 MG: 25 CAPSULE ORAL at 21:02

## 2017-02-07 RX ADMIN — OXYCODONE HYDROCHLORIDE 20 MG: 10 TABLET ORAL at 23:41

## 2017-02-07 RX ADMIN — HYDROMORPHONE HYDROCHLORIDE 0.5 MG: 1 INJECTION, SOLUTION INTRAMUSCULAR; INTRAVENOUS; SUBCUTANEOUS at 21:15

## 2017-02-07 RX ADMIN — TRAZODONE HYDROCHLORIDE 50 MG: 50 TABLET ORAL at 21:03

## 2017-02-07 RX ADMIN — SODIUM BICARBONATE: 84 INJECTION, SOLUTION INTRAVENOUS at 20:59

## 2017-02-07 RX ADMIN — HYDROMORPHONE HYDROCHLORIDE 0.5 MG: 1 INJECTION, SOLUTION INTRAMUSCULAR; INTRAVENOUS; SUBCUTANEOUS at 04:15

## 2017-02-07 RX ADMIN — OXYCODONE HYDROCHLORIDE 20 MG: 10 TABLET ORAL at 02:18

## 2017-02-07 RX ADMIN — PREGABALIN 25 MG: 25 CAPSULE ORAL at 08:24

## 2017-02-07 RX ADMIN — SODIUM CHLORIDE: 9 INJECTION, SOLUTION INTRAVENOUS at 08:45

## 2017-02-07 RX ADMIN — HEPARIN SODIUM 5000 UNITS: 5000 INJECTION, SOLUTION INTRAVENOUS; SUBCUTANEOUS at 13:07

## 2017-02-07 RX ADMIN — SODIUM BICARBONATE: 84 INJECTION, SOLUTION INTRAVENOUS at 05:32

## 2017-02-07 RX ADMIN — HYDROMORPHONE HYDROCHLORIDE 0.5 MG: 1 INJECTION, SOLUTION INTRAMUSCULAR; INTRAVENOUS; SUBCUTANEOUS at 17:12

## 2017-02-07 RX ADMIN — WARFARIN SODIUM 7.5 MG: 7.5 TABLET ORAL at 17:12

## 2017-02-07 RX ADMIN — CEFEPIME 1 G: 1 INJECTION, POWDER, FOR SOLUTION INTRAMUSCULAR; INTRAVENOUS at 08:23

## 2017-02-07 RX ADMIN — OXYCODONE HYDROCHLORIDE 20 MG: 10 TABLET ORAL at 14:51

## 2017-02-07 RX ADMIN — OXYCODONE HYDROCHLORIDE 20 MG: 10 TABLET ORAL at 19:05

## 2017-02-07 RX ADMIN — HEPARIN SODIUM 5000 UNITS: 5000 INJECTION, SOLUTION INTRAVENOUS; SUBCUTANEOUS at 05:32

## 2017-02-07 RX ADMIN — Medication 667 MG: at 13:07

## 2017-02-07 RX ADMIN — IOHEXOL: 350 INJECTION, SOLUTION INTRAVENOUS at 14:27

## 2017-02-07 RX ADMIN — ONDANSETRON 4 MG: 2 INJECTION, SOLUTION INTRAMUSCULAR; INTRAVENOUS at 17:13

## 2017-02-07 RX ADMIN — VITAMIN D, TAB 1000IU (100/BT) 10000 UNITS: 25 TAB at 08:25

## 2017-02-07 RX ADMIN — LIDOCAINE 1 PATCH: 50 PATCH CUTANEOUS at 23:42

## 2017-02-07 RX ADMIN — HYDROMORPHONE HYDROCHLORIDE 0.5 MG: 1 INJECTION, SOLUTION INTRAMUSCULAR; INTRAVENOUS; SUBCUTANEOUS at 13:06

## 2017-02-07 RX ADMIN — HYDROMORPHONE HYDROCHLORIDE 0.5 MG: 1 INJECTION, SOLUTION INTRAMUSCULAR; INTRAVENOUS; SUBCUTANEOUS at 08:25

## 2017-02-07 RX ADMIN — OXYCODONE HYDROCHLORIDE 20 MG: 10 TABLET ORAL at 10:05

## 2017-02-07 RX ADMIN — PROMETHAZINE HYDROCHLORIDE 25 MG: 25 TABLET ORAL at 10:05

## 2017-02-07 RX ADMIN — TIZANIDINE 4 MG: 4 TABLET ORAL at 21:03

## 2017-02-07 RX ADMIN — BUPROPION HYDROCHLORIDE 150 MG: 150 TABLET, FILM COATED, EXTENDED RELEASE ORAL at 08:24

## 2017-02-07 ASSESSMENT — PAIN SCALES - GENERAL
PAINLEVEL_OUTOF10: 8
PAINLEVEL_OUTOF10: 7
PAINLEVEL_OUTOF10: 7
PAINLEVEL_OUTOF10: 8

## 2017-02-07 ASSESSMENT — ENCOUNTER SYMPTOMS
MUSCULOSKELETAL NEGATIVE: 1
FLANK PAIN: 1
DIZZINESS: 0
EYES NEGATIVE: 1
COUGH: 1
CHILLS: 0
SPUTUM PRODUCTION: 0
FOCAL WEAKNESS: 1
ABDOMINAL PAIN: 1
VOMITING: 0
FEVER: 0
CARDIOVASCULAR NEGATIVE: 1
PSYCHIATRIC NEGATIVE: 1
WEAKNESS: 1

## 2017-02-07 ASSESSMENT — LIFESTYLE VARIABLES
ALCOHOL_USE: NO
EVER_SMOKED: YES

## 2017-02-07 NOTE — PROGRESS NOTES
Inpatient Anticoagulation Service Note  Date: 2/7/2017  Estimated Creatinine Clearance: 14.4 mL/min (by C-G formula based on Cr of 5.63).  Reason for Anticoagulation: Deep Vein Thrombosis, Other (Comments) (Hx SLE)   Hemoglobin Value: 9.7  Hematocrit Value: 29.1  Lab Platelet Value: 235  Target INR: 2.0 to 3.0  INR from last 7 days     Date/Time INR Value    02/07/17 0234 (!)1.2    02/06/17 1012 (!)1.23        Dose from last 7 days     Date/Time Dose (mg)    02/07/17 1200 7.5    02/06/17 0800 7.5        Average Dose (mg): TBD (Home Dose: 7.5 mg Holcomb/Tu and 5 mg ROW per chart review)  Significant Interactions: Antibiotics, Other (Comments) (calcium acetate, bupropion, SQ heparin, trazodone, tizanidine, hydroxychloroquine)  Bridge Therapy: No   Reversal Agent Administered: Not Applicable  Comments: INR unmoved overnight. Patient with complex PMH including past suspected non-compliance, VTE, and SLE. Likely to resume iHD this admission. Multiple warfarin interactions. H/H low/stable, PLT WNL. Will give 7.5 mg tonight and trend INR with AM labs. No overt bleeding noted per chart review. Very likely to need dose adjustment pending course of admission. Also likely to need close follow-up/monitoring on discharge.    Plan:  Warfarin 7.5 mg 2/7/17  Education Material Provided?: No  Pharmacist suggested discharge dosing: warfarin 7.5 mg Holcomb/Tu and 5 mg all other days (pending clinical disposition)    Gary Rain, PHARMD

## 2017-02-07 NOTE — DISCHARGE PLANNING
Pt was on hemodialysis at [facility], but discontinued treatment.  She would like to switch dialysis facilities and nephrologists, but is unsure about which option will work for her.  Pt states that it is too painful to sit in the dialysis center for 4 hours while on tx, and that she is unable to make it to all of her appointments due to work and school.  Provided brief education about nocturnal in-center dialysis and home hemodialysis as other possible options.  Pt would like to have time to think about those modalities.  Will meet with pt again tomorrow to answer any further questions and to see if pt has made a choice.  Pt cannot DC until OP dialysis arrangements have been finalized.

## 2017-02-07 NOTE — WOUND TEAM
Checked pt's back due to wound consult order.  Picture taken.  Skin intact.  Pt states that there was a small scab there before.  No wounds noted.  Staff RN updated.

## 2017-02-07 NOTE — PROGRESS NOTES
Assessment completed. Patient A&O x 4. Pt c/o 8/10 bilateral flank pain, medicated per MAR. POC discussed. Call light & bedside table within reach. Bed locked and in lowest position. Bed alarm on and fall precautions in place. Hourly rounding in place.

## 2017-02-07 NOTE — CONSULTS
DATE OF SERVICE:  02/06/2017    REQUESTING PHYSICIAN:  Dr. Shanita Lee.    REASON FOR CONSULTATION:  End-stage renal disease, assess need for dialysis.    HISTORY OF PRESENT ILLNESS:  This is a 36-year-old female with lupus nephritis   leading to end-stage renal disease who dialyzed previously on a Monday,   Wednesday, and Friday schedule.  She actually has missed dialysis for over 2   months and came in with uremia, feeling weak, tired, short of breath with serum   electrolytes consistent with bicarbonate of 11, BUN of 95, creatinine of   10.25.  She states that she has been missing dialysis due to problems with   transportation.  She states that she has been progressively worsening.    PAST MEDICAL HISTORY:  End-stage renal disease secondary to lupus nephritis,   fibromyalgia, hypertension, chronic pain, secondary hyperparathyroidism,   anemia of chronic kidney disease.    SOCIAL HISTORY:  No tobacco or alcohol use.    FAMILY HISTORY:  Noncontributory.    REVIEW OF SYSTEMS:  As per HPI, otherwise negative.    PHYSICAL EXAMINATION:  VITAL SIGNS:  Blood pressure of 140/97, heart rate 101, respiratory rate 18.  GENERAL:  Lying in bed, appears fatigued.  EYES:  Normal sclerae, anicteric.  ENT:  Mucous members are dry.  NECK:  No elevation in jugular venous pulsation.  CARDIOVASCULAR:  Regular.  No rubs or gallops.  ABDOMEN:  Soft, nontender, nondistended.  EXTREMITIES:  Trace lower extremity edema bilaterally.    LABORATORY STUDIES:  White blood cell count 10.6, hemoglobin 9.2, platelet   count 245.  Sodium 136, potassium 3.3, chloride 107, bicarbonate 11, BUN 95,   creatinine 10.25, calcium of 8.9, albumin 3.4.    ASSESSMENT:  1.  End-stage renal disease.  The patient is noncompliant with dialysis.    Discussed at length with patient.  We will continue to discuss.  We will ask   for dialysis social worker for persistence.  We will dialyze patient today as   she is uremic.  She may require a second treatment to  fully clear her uremia.  2.  Hypokalemia, severely hypokalemic, especially in light of the acidosis, we   will replete with potassium chloride p.o.  3.  Anemia of chronic kidney disease.  Hemoglobin is 9.2.  We will need to   give Epogen.    Thank you for asking us to see this patient.  We will follow closely with you.       ____________________________________     MD GÉNESIS MAGDALENO / ISSAC    DD:  02/06/2017 17:22:09  DT:  02/06/2017 21:47:53    D#:  [id number]  Job#:  [id number]

## 2017-02-07 NOTE — PROGRESS NOTES
Pt dialyzed for 3 hrs today from 5281-4645.  Net UF 1.5L. Tolerated tx, dsgs applied to AVG sites prior to d/c.. Pt tachycardic throughout tx 100-122.  She was medicated for pain by primary RN at 1604  And con't to c/o of 9/10 until d/c to unit.  Report called to XIOMARA Ferrara RN

## 2017-02-07 NOTE — PROGRESS NOTES
Pt care assumed. Pt lying in bed. A&O x 4. No distress present. Call light, phone, and bedside table within reach. White board updated and plan of care discussed with patient. Bed alarm and strip alarm set and in place. No concerns present at this time. Will continue to monitor.

## 2017-02-08 ENCOUNTER — APPOINTMENT (OUTPATIENT)
Dept: RADIOLOGY | Facility: MEDICAL CENTER | Age: 37
DRG: 683 | End: 2017-02-08
Attending: HOSPITALIST
Payer: MEDICARE

## 2017-02-08 ENCOUNTER — HOSPITAL ENCOUNTER (OUTPATIENT)
Dept: RADIOLOGY | Facility: MEDICAL CENTER | Age: 37
End: 2017-02-08
Attending: HOSPITALIST
Payer: MEDICARE

## 2017-02-08 LAB
ALBUMIN SERPL BCP-MCNC: 3.1 G/DL (ref 3.2–4.9)
ALBUMIN/GLOB SERPL: 0.9 G/DL
ALP SERPL-CCNC: 53 U/L (ref 30–99)
ALT SERPL-CCNC: 10 U/L (ref 2–50)
ANION GAP SERPL CALC-SCNC: 11 MMOL/L (ref 0–11.9)
ANISOCYTOSIS BLD QL SMEAR: ABNORMAL
AST SERPL-CCNC: 14 U/L (ref 12–45)
BACTERIA UR CULT: ABNORMAL
BACTERIA UR CULT: ABNORMAL
BASOPHILS # BLD AUTO: 0 % (ref 0–1.8)
BASOPHILS # BLD: 0 K/UL (ref 0–0.12)
BILIRUB SERPL-MCNC: 0.3 MG/DL (ref 0.1–1.5)
BUN SERPL-MCNC: 51 MG/DL (ref 8–22)
CALCIUM SERPL-MCNC: 9 MG/DL (ref 8.5–10.5)
CHLORIDE SERPL-SCNC: 97 MMOL/L (ref 96–112)
CO2 SERPL-SCNC: 26 MMOL/L (ref 20–33)
CREAT SERPL-MCNC: 6.36 MG/DL (ref 0.5–1.4)
EOSINOPHIL # BLD AUTO: 0.11 K/UL (ref 0–0.51)
EOSINOPHIL NFR BLD: 1.8 % (ref 0–6.9)
ERYTHROCYTE [DISTWIDTH] IN BLOOD BY AUTOMATED COUNT: 48 FL (ref 35.9–50)
GFR SERPL CREATININE-BSD FRML MDRD: 7 ML/MIN/1.73 M 2
GLOBULIN SER CALC-MCNC: 3.6 G/DL (ref 1.9–3.5)
GLUCOSE SERPL-MCNC: 92 MG/DL (ref 65–99)
HCG UR QL: NEGATIVE
HCT VFR BLD AUTO: 27.3 % (ref 37–47)
HGB BLD-MCNC: 8.9 G/DL (ref 12–16)
INR PPP: 1.52 (ref 0.87–1.13)
LYMPHOCYTES # BLD AUTO: 1.67 K/UL (ref 1–4.8)
LYMPHOCYTES NFR BLD: 26.5 % (ref 22–41)
MAGNESIUM SERPL-MCNC: 2 MG/DL (ref 1.5–2.5)
MANUAL DIFF BLD: NORMAL
MCH RBC QN AUTO: 31.4 PG (ref 27–33)
MCHC RBC AUTO-ENTMCNC: 32.6 G/DL (ref 33.6–35)
MCV RBC AUTO: 96.5 FL (ref 81.4–97.8)
MICROCYTES BLD QL SMEAR: ABNORMAL
MONOCYTES # BLD AUTO: 0.33 K/UL (ref 0–0.85)
MONOCYTES NFR BLD AUTO: 5.3 % (ref 0–13.4)
MORPHOLOGY BLD-IMP: NORMAL
NEUTROPHILS # BLD AUTO: 4.18 K/UL (ref 2–7.15)
NEUTROPHILS NFR BLD: 66.4 % (ref 44–72)
NRBC # BLD AUTO: 0 K/UL
NRBC BLD AUTO-RTO: 0 /100 WBC
PHOSPHATE SERPL-MCNC: 4.5 MG/DL (ref 2.5–4.5)
PLATELET # BLD AUTO: 200 K/UL (ref 164–446)
PLATELET BLD QL SMEAR: NORMAL
PMV BLD AUTO: 10.4 FL (ref 9–12.9)
POIKILOCYTOSIS BLD QL SMEAR: NORMAL
POTASSIUM SERPL-SCNC: 3.5 MMOL/L (ref 3.6–5.5)
PROT SERPL-MCNC: 6.7 G/DL (ref 6–8.2)
PROTHROMBIN TIME: 18.8 SEC (ref 12–14.6)
RBC # BLD AUTO: 2.83 M/UL (ref 4.2–5.4)
RBC BLD AUTO: PRESENT
SCHISTOCYTES BLD QL SMEAR: NORMAL
SIGNIFICANT IND 70042: ABNORMAL
SITE SITE: ABNORMAL
SODIUM SERPL-SCNC: 134 MMOL/L (ref 135–145)
SOURCE SOURCE: ABNORMAL
SP GR UR REFRACTOMETRY: 1.01
WBC # BLD AUTO: 6.3 K/UL (ref 4.8–10.8)

## 2017-02-08 PROCEDURE — 700105 HCHG RX REV CODE 258

## 2017-02-08 PROCEDURE — 74177 CT ABD & PELVIS W/CONTRAST: CPT

## 2017-02-08 PROCEDURE — 80053 COMPREHEN METABOLIC PANEL: CPT

## 2017-02-08 PROCEDURE — 83735 ASSAY OF MAGNESIUM: CPT

## 2017-02-08 PROCEDURE — A9270 NON-COVERED ITEM OR SERVICE: HCPCS | Performed by: INTERNAL MEDICINE

## 2017-02-08 PROCEDURE — 84100 ASSAY OF PHOSPHORUS: CPT

## 2017-02-08 PROCEDURE — 700105 HCHG RX REV CODE 258: Performed by: HOSPITALIST

## 2017-02-08 PROCEDURE — 700111 HCHG RX REV CODE 636 W/ 250 OVERRIDE (IP): Performed by: HOSPITALIST

## 2017-02-08 PROCEDURE — 700111 HCHG RX REV CODE 636 W/ 250 OVERRIDE (IP): Performed by: NURSE PRACTITIONER

## 2017-02-08 PROCEDURE — 90935 HEMODIALYSIS ONE EVALUATION: CPT

## 2017-02-08 PROCEDURE — 85007 BL SMEAR W/DIFF WBC COUNT: CPT

## 2017-02-08 PROCEDURE — 86480 TB TEST CELL IMMUN MEASURE: CPT

## 2017-02-08 PROCEDURE — 700102 HCHG RX REV CODE 250 W/ 637 OVERRIDE(OP): Performed by: HOSPITALIST

## 2017-02-08 PROCEDURE — 700101 HCHG RX REV CODE 250: Performed by: HOSPITALIST

## 2017-02-08 PROCEDURE — A9270 NON-COVERED ITEM OR SERVICE: HCPCS | Performed by: HOSPITALIST

## 2017-02-08 PROCEDURE — 36415 COLL VENOUS BLD VENIPUNCTURE: CPT

## 2017-02-08 PROCEDURE — 85610 PROTHROMBIN TIME: CPT

## 2017-02-08 PROCEDURE — 700111 HCHG RX REV CODE 636 W/ 250 OVERRIDE (IP): Performed by: INTERNAL MEDICINE

## 2017-02-08 PROCEDURE — 770020 HCHG ROOM/CARE - TELE (206)

## 2017-02-08 PROCEDURE — 700117 HCHG RX CONTRAST REV CODE 255: Performed by: HOSPITALIST

## 2017-02-08 PROCEDURE — 99233 SBSQ HOSP IP/OBS HIGH 50: CPT | Performed by: HOSPITALIST

## 2017-02-08 PROCEDURE — 700102 HCHG RX REV CODE 250 W/ 637 OVERRIDE(OP): Performed by: INTERNAL MEDICINE

## 2017-02-08 PROCEDURE — 85027 COMPLETE CBC AUTOMATED: CPT

## 2017-02-08 RX ORDER — SODIUM CHLORIDE 9 MG/ML
INJECTION, SOLUTION INTRAVENOUS
Status: COMPLETED
Start: 2017-02-08 | End: 2017-02-08

## 2017-02-08 RX ORDER — HEPARIN SODIUM 1000 [USP'U]/ML
INJECTION, SOLUTION INTRAVENOUS; SUBCUTANEOUS
Status: DISPENSED
Start: 2017-02-08 | End: 2017-02-08

## 2017-02-08 RX ADMIN — IOHEXOL 100 ML: 350 INJECTION, SOLUTION INTRAVENOUS at 05:32

## 2017-02-08 RX ADMIN — ONDANSETRON 4 MG: 2 INJECTION, SOLUTION INTRAMUSCULAR; INTRAVENOUS at 06:41

## 2017-02-08 RX ADMIN — PROMETHAZINE HYDROCHLORIDE 25 MG: 25 TABLET ORAL at 10:38

## 2017-02-08 RX ADMIN — PROMETHAZINE HYDROCHLORIDE 25 MG: 25 TABLET ORAL at 19:06

## 2017-02-08 RX ADMIN — LIDOCAINE 1 PATCH: 50 PATCH CUTANEOUS at 22:11

## 2017-02-08 RX ADMIN — HYDROMORPHONE HYDROCHLORIDE 0.5 MG: 1 INJECTION, SOLUTION INTRAMUSCULAR; INTRAVENOUS; SUBCUTANEOUS at 15:15

## 2017-02-08 RX ADMIN — TIZANIDINE 4 MG: 4 TABLET ORAL at 22:10

## 2017-02-08 RX ADMIN — OXYCODONE HYDROCHLORIDE 20 MG: 10 TABLET ORAL at 14:12

## 2017-02-08 RX ADMIN — PREGABALIN 50 MG: 25 CAPSULE ORAL at 22:10

## 2017-02-08 RX ADMIN — TRAZODONE HYDROCHLORIDE 50 MG: 50 TABLET ORAL at 22:11

## 2017-02-08 RX ADMIN — ERYTHROPOIETIN 10000 UNITS: 10000 INJECTION, SOLUTION INTRAVENOUS; SUBCUTANEOUS at 08:02

## 2017-02-08 RX ADMIN — PREGABALIN 25 MG: 25 CAPSULE ORAL at 08:01

## 2017-02-08 RX ADMIN — Medication 667 MG: at 18:02

## 2017-02-08 RX ADMIN — POTASSIUM CHLORIDE 20 MEQ: 1500 TABLET, EXTENDED RELEASE ORAL at 09:00

## 2017-02-08 RX ADMIN — HYDROMORPHONE HYDROCHLORIDE 0.5 MG: 1 INJECTION, SOLUTION INTRAMUSCULAR; INTRAVENOUS; SUBCUTANEOUS at 10:39

## 2017-02-08 RX ADMIN — OXYCODONE HYDROCHLORIDE 20 MG: 10 TABLET ORAL at 18:02

## 2017-02-08 RX ADMIN — HYDROMORPHONE HYDROCHLORIDE 0.5 MG: 1 INJECTION, SOLUTION INTRAMUSCULAR; INTRAVENOUS; SUBCUTANEOUS at 02:30

## 2017-02-08 RX ADMIN — HEPARIN SODIUM 5000 UNITS: 5000 INJECTION, SOLUTION INTRAVENOUS; SUBCUTANEOUS at 22:11

## 2017-02-08 RX ADMIN — SODIUM CHLORIDE 500 ML: 9 INJECTION, SOLUTION INTRAVENOUS at 23:58

## 2017-02-08 RX ADMIN — PROMETHAZINE HYDROCHLORIDE 25 MG: 25 TABLET ORAL at 15:15

## 2017-02-08 RX ADMIN — CEFEPIME 1 G: 1 INJECTION, POWDER, FOR SOLUTION INTRAMUSCULAR; INTRAVENOUS at 07:39

## 2017-02-08 RX ADMIN — HYDROMORPHONE HYDROCHLORIDE 0.5 MG: 1 INJECTION, SOLUTION INTRAMUSCULAR; INTRAVENOUS; SUBCUTANEOUS at 19:06

## 2017-02-08 RX ADMIN — SODIUM BICARBONATE: 84 INJECTION, SOLUTION INTRAVENOUS at 07:39

## 2017-02-08 RX ADMIN — OXYCODONE HYDROCHLORIDE 20 MG: 10 TABLET ORAL at 22:11

## 2017-02-08 RX ADMIN — HYDROMORPHONE HYDROCHLORIDE 0.5 MG: 1 INJECTION, SOLUTION INTRAMUSCULAR; INTRAVENOUS; SUBCUTANEOUS at 23:58

## 2017-02-08 RX ADMIN — OXYCODONE HYDROCHLORIDE 20 MG: 10 TABLET ORAL at 08:05

## 2017-02-08 RX ADMIN — Medication 667 MG: at 08:01

## 2017-02-08 RX ADMIN — HYDROXYCHLOROQUINE SULFATE 200 MG: 200 TABLET ORAL at 22:10

## 2017-02-08 RX ADMIN — HYDROMORPHONE HYDROCHLORIDE 0.5 MG: 1 INJECTION, SOLUTION INTRAMUSCULAR; INTRAVENOUS; SUBCUTANEOUS at 06:39

## 2017-02-08 RX ADMIN — OXYCODONE HYDROCHLORIDE 20 MG: 10 TABLET ORAL at 04:14

## 2017-02-08 RX ADMIN — AMPICILLIN SODIUM AND SULBACTAM SODIUM 3 G: 2; 1 INJECTION, POWDER, FOR SOLUTION INTRAMUSCULAR; INTRAVENOUS at 23:58

## 2017-02-08 RX ADMIN — HEPARIN SODIUM 5000 UNITS: 5000 INJECTION, SOLUTION INTRAVENOUS; SUBCUTANEOUS at 14:12

## 2017-02-08 ASSESSMENT — ENCOUNTER SYMPTOMS
NAUSEA: 0
MUSCULOSKELETAL NEGATIVE: 1
CARDIOVASCULAR NEGATIVE: 1
CHILLS: 0
FOCAL WEAKNESS: 1
FEVER: 0
VOMITING: 0
COUGH: 1
WEAKNESS: 1
PSYCHIATRIC NEGATIVE: 1
DIZZINESS: 0
FLANK PAIN: 1
SPUTUM PRODUCTION: 0
ABDOMINAL PAIN: 1
EYES NEGATIVE: 1

## 2017-02-08 ASSESSMENT — PAIN SCALES - GENERAL
PAINLEVEL_OUTOF10: 8
PAINLEVEL_OUTOF10: 7
PAINLEVEL_OUTOF10: 8

## 2017-02-08 NOTE — PROGRESS NOTES
3hr HD started @ 1045 and completed @ 1346,tx well tolerated,VSS,net UF = 2000ml.LUAAVG + bruit/thrill,cannulation sites covered w/ CDI,report given to KALEY Kearney RN.

## 2017-02-08 NOTE — PROGRESS NOTES
Inpatient Anticoagulation Service Note    Date: 2017  Reason for Anticoagulation: Deep Vein Thrombosis, Other (Comments) (Hx SLE)        Hemoglobin Value: 8.9  Hematocrit Value: 27.3  Lab Platelet Value: 200  Target INR: 2.0 to 3.0    INR from last 7 days     Date/Time INR Value    17 0251 (!)1.52    17 0234 (!)1.2    17 1012 (!)1.23        Dose from last 7 days     Date/Time Dose (mg)    17 1200 5    17 1200 7.5    17 0800 7.5        Average Dose (mg):  (Home Dose: 7.5 mg Holcomb/Tu and 5 mg ROW per chart review)  Significant Interactions: Antibiotics, Other (Comments) (calcium acetate, buproprion, SQ heparin, hydroxychloroquine)  Bridge Therapy: No (Heparin 5000 units SQ Q 8 hours)    Reversal Agent Administered: Not Applicable  Comments: INR up nicely.  Will continue with scheduled home dose of 5 mg tonight.  INR daily for the next few days.      Plan:  5 mg tonight  Education Material Provided?: Yes (no questions, pt declines packet)  Pharmacist suggested discharge dosin.5 mg Sun, Tue and 5 mg daily rest of week.     Antonio Romero, PharmD, BCPS

## 2017-02-08 NOTE — CARE PLAN
Problem: Safety  Goal: Will remain free from injury  Outcome: PROGRESSING AS EXPECTED  Pt remains free from falls or injuries. Bed alarm and strip alarm set and in place. Pt calls for assistance appropriately.     Problem: Venous Thromboembolism (VTW)/Deep Vein Thrombosis (DVT) Prevention:  Goal: Patient will participate in Venous Thrombosis (VTE)/Deep Vein Thrombosis (DVT)Prevention Measures  Outcome: PROGRESSING AS EXPECTED  Pt free from s/x of DVT. Pt receiving heparin injection and warfarin for VTE prophylaxis.

## 2017-02-08 NOTE — PROGRESS NOTES
Surgical Progress Note    Author: Michael Hubbard Date & Time created: 2017   7:46 AM     Interval Events:  CT reviewed.   Hepatobiliary system now normal.   Review of Systems   Gastrointestinal: Negative for nausea and vomiting.     Hemodynamics:  Temp (24hrs), Av.2 °C (99 °F), Min:36.7 °C (98 °F), Max:38.1 °C (100.6 °F)  Temperature: 36.7 °C (98.1 °F)  Pulse  Av.2  Min: 81  Max: 122  Blood Pressure: 115/81 mmHg     Respiratory:    Respiration: 18, Pulse Oximetry: 92 %     Work Of Breathing / Effort: Mild  RUL Breath Sounds: Clear, RML Breath Sounds: Clear, RLL Breath Sounds: Clear, LASHANDA Breath Sounds: Clear, LLL Breath Sounds: Clear  Neuro:  GCS       Fluids:  No intake or output data in the 24 hours ending 17 0746  Weight: 76.4 kg (168 lb 6.9 oz)  Current Diet Order   Procedures   • DIET NPO     Physical Exam   Constitutional: No distress.   Abdominal: There is no guarding.     Labs:  Recent Results (from the past 24 hour(s))   D-DIMER    Collection Time: 17 12:33 PM   Result Value Ref Range    D-Dimer Screen 6699 (H) <250 ng/mL(D-DU)   PROTHROMBIN TIME    Collection Time: 17  2:51 AM   Result Value Ref Range    PT 18.8 (H) 12.0 - 14.6 sec    INR 1.52 (H) 0.87 - 1.13   CBC WITH DIFFERENTIAL    Collection Time: 17  2:51 AM   Result Value Ref Range    WBC 6.3 4.8 - 10.8 K/uL    RBC 2.83 (L) 4.20 - 5.40 M/uL    Hemoglobin 8.9 (L) 12.0 - 16.0 g/dL    Hematocrit 27.3 (L) 37.0 - 47.0 %    MCV 96.5 81.4 - 97.8 fL    MCH 31.4 27.0 - 33.0 pg    MCHC 32.6 (L) 33.6 - 35.0 g/dL    RDW 48.0 35.9 - 50.0 fL    Platelet Count 200 164 - 446 K/uL    MPV 10.4 9.0 - 12.9 fL    Nucleated RBC 0.00 /100 WBC    NRBC (Absolute) 0.00 K/uL    Neutrophils-Polys 66.40 44.00 - 72.00 %    Lymphocytes 26.50 22.00 - 41.00 %    Monocytes 5.30 0.00 - 13.40 %    Eosinophils 1.80 0.00 - 6.90 %    Basophils 0.00 0.00 - 1.80 %    Neutrophils (Absolute) 4.18 2.00 - 7.15 K/uL    Lymphs (Absolute) 1.67 1.00 - 4.80 K/uL     Monos (Absolute) 0.33 0.00 - 0.85 K/uL    Eos (Absolute) 0.11 0.00 - 0.51 K/uL    Baso (Absolute) 0.00 0.00 - 0.12 K/uL    Anisocytosis 1+     Microcytosis 1+    COMP METABOLIC PANEL    Collection Time: 02/08/17  2:51 AM   Result Value Ref Range    Sodium 134 (L) 135 - 145 mmol/L    Potassium 3.5 (L) 3.6 - 5.5 mmol/L    Chloride 97 96 - 112 mmol/L    Co2 26 20 - 33 mmol/L    Anion Gap 11.0 0.0 - 11.9    Glucose 92 65 - 99 mg/dL    Bun 51 (H) 8 - 22 mg/dL    Creatinine 6.36 (HH) 0.50 - 1.40 mg/dL    Calcium 9.0 8.5 - 10.5 mg/dL    AST(SGOT) 14 12 - 45 U/L    ALT(SGPT) 10 2 - 50 U/L    Alkaline Phosphatase 53 30 - 99 U/L    Total Bilirubin 0.3 0.1 - 1.5 mg/dL    Albumin 3.1 (L) 3.2 - 4.9 g/dL    Total Protein 6.7 6.0 - 8.2 g/dL    Globulin 3.6 (H) 1.9 - 3.5 g/dL    A-G Ratio 0.9 g/dL   MAGNESIUM    Collection Time: 02/08/17  2:51 AM   Result Value Ref Range    Magnesium 2.0 1.5 - 2.5 mg/dL   PHOSPHORUS    Collection Time: 02/08/17  2:51 AM   Result Value Ref Range    Phosphorus 4.5 2.5 - 4.5 mg/dL   ESTIMATED GFR    Collection Time: 02/08/17  2:51 AM   Result Value Ref Range    GFR If  9 (A) >60 mL/min/1.73 m 2    GFR If Non African American 7 (A) >60 mL/min/1.73 m 2   DIFFERENTIAL MANUAL    Collection Time: 02/08/17  2:51 AM   Result Value Ref Range    Manual Diff Status PERFORMED    PERIPHERAL SMEAR REVIEW    Collection Time: 02/08/17  2:51 AM   Result Value Ref Range    Peripheral Smear Review see below    PLATELET ESTIMATE    Collection Time: 02/08/17  2:51 AM   Result Value Ref Range    Plt Estimation Normal    MORPHOLOGY    Collection Time: 02/08/17  2:51 AM   Result Value Ref Range    RBC Morphology Present     Poikilocytosis 1+     Schistocytes 1+      Medical Decision Making, by Problem:  Active Hospital Problems    Diagnosis   • 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   Surgery signing off.   Please re consult as necessary  2.   Pericardial and pleural effusion per primary team.  3.   Ok to eat per surgery    Quality Measures:  Core Measures    Discussed patient condition with Patient

## 2017-02-08 NOTE — CARE PLAN
Problem: Nutritional:  Goal: Achieve adequate nutritional intake  Patient will consume >50-75% of meals  Outcome: PROGRESSING AS EXPECTED

## 2017-02-08 NOTE — PROGRESS NOTES
Hospital Medicine Progress Note, Adult, Complex               Author: Danniechaz Shaikh Date & Time created: 2/7/2017  7:23 PM     Interval History:  Patient seen and examined today.    Patient tolerating treatment and therapies.  All Data, Medication data reviewed.  Case discussed with nursing as available.  Plan of Care reviewed with patient and notified of changes.  2/7 Pt admitted with generalized pain, R flank pain and malaise, noncompliant with Dialysis and RX, INR low, though states she take coumadin  Asking for more pain RX, CT chest ordered, gas in the portal vein noted, consulted surgery for eval  Concern for intraabdominal infection, process..  kindly evaluated    Review of Systems:  Review of Systems   Constitutional: Positive for malaise/fatigue. Negative for fever and chills.   HENT: Negative.    Eyes: Negative.    Respiratory: Positive for cough. Negative for sputum production.    Cardiovascular: Negative.    Gastrointestinal: Positive for abdominal pain. Negative for vomiting.   Genitourinary: Positive for flank pain. Negative for urgency.   Musculoskeletal: Negative.    Skin: Negative.  Negative for rash.   Neurological: Positive for focal weakness and weakness. Negative for dizziness.   Endo/Heme/Allergies: Negative.    Psychiatric/Behavioral: Negative.    All other systems reviewed and are negative.      Physical Exam:  Physical Exam   Constitutional: She is oriented to person, place, and time. She appears well-developed and well-nourished.   HENT:   Head: Normocephalic and atraumatic.   Nose: Nose normal.   Mouth/Throat: Oropharynx is clear and moist.   Eyes: Conjunctivae and EOM are normal. Pupils are equal, round, and reactive to light.   Neck: Normal range of motion. Neck supple. No JVD present. No thyromegaly present.   Cardiovascular: Normal rate, regular rhythm and normal heart sounds.  Exam reveals no gallop and no friction rub.    Pulmonary/Chest: Effort normal and breath sounds  normal. No respiratory distress. She has no wheezes. She has no rales.   L sided port   Abdominal: Soft. She exhibits no distension and no mass. There is tenderness. There is no rebound and no guarding.   Musculoskeletal: Normal range of motion. She exhibits edema. She exhibits no tenderness.   Lymphadenopathy:     She has no cervical adenopathy.   Neurological: She is alert and oriented to person, place, and time. No cranial nerve deficit.   Skin: Skin is warm and dry. She is not diaphoretic.   Psychiatric: She has a normal mood and affect. Her behavior is normal.   Nursing note and vitals reviewed.      Labs:        Invalid input(s): [id number]      Recent Labs      17   0234   SODIUM  136  137   POTASSIUM  3.3*  3.3*   CHLORIDE  107  102   CO2  11*  23   BUN  95*  45*   CREATININE  10.25*  5.63*   MAGNESIUM  2.3   --    CALCIUM  8.9  8.9     Recent Labs      1728  17   0234   ALTSGPT  6  <5   ASTSGOT  8*  9*   ALKPHOSPHAT  65  60   TBILIRUBIN  0.3  0.4   GLUCOSE  104*  76     Recent Labs      1728  17   1012  17   0234   RBC  3.01*   --   3.13*   HEMOGLOBIN  9.2*   --   9.7*   HEMATOCRIT  29.0*   --   29.1*   PLATELETCT  245   --   235   PROTHROMBTM   --   15.9*  15.6*   INR   --   1.23*  1.20*     Recent Labs      1728  17   0234   WBC  10.6  8.7   NEUTSPOLYS  70.50  64.30   LYMPHOCYTES  14.10*  18.20*   MONOCYTES  12.10  14.50*   EOSINOPHILS  1.30  1.00   BASOPHILS  0.60  0.80   ASTSGOT  8*  9*   ALTSGPT  6  <5   ALKPHOSPHAT  65  60   TBILIRUBIN  0.3  0.4           Hemodynamics:  Temp (24hrs), Av.6 °C (99.7 °F), Min:37.3 °C (99.1 °F), Max:38.1 °C (100.6 °F)  Temperature: 37.6 °C (99.7 °F)  Pulse  Av.1  Min: 95  Max: 122  Blood Pressure: 113/70 mmHg     Respiratory:    Respiration: 18, Pulse Oximetry: 90 %, O2 Daily Delivery Respiratory : Silicone Nasal Cannula     Work Of Breathing / Effort: Mild;Shallow  RUL Breath  Sounds: Clear, RML Breath Sounds: Clear, RLL Breath Sounds: Clear, LASHANDA Breath Sounds: Clear, LLL Breath Sounds: Clear  Fluids:  No intake or output data in the 24 hours ending 02/07/17 1923  Weight: 76.1 kg (167 lb 12.3 oz)  GI/Nutrition:  Orders Placed This Encounter   Procedures   • Diet Order     Standing Status: Standing      Number of Occurrences: 1      Standing Expiration Date:      Order Specific Question:  Diet:     Answer:  Renal [8]     Medical Decision Making, by Problem:  Active Hospital Problems    Diagnosis   • Abdominal pain [R10.9], CT without contrast negative, f/u CT chest with portal vein gas   • UTI (urinary tract infection) [N39.0] on Abx, f/u CX's   • Fibromyalgia [M79.7] chr. 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 Disease  Plan  Surgery eval  Await urine CX's  Repeat CT in am 0500  F/u labs  C/w current RX and abx  See orders  Pt medically complex  Time spent 32 min.  Medications reviewed, Radiology images reviewed and Labs reviewed  Taylor catheter: No Taylro  Central line in place: Need for access    DVT Prophylaxis: Warfarin (Coumadin) and Heparin      Antibiotics: Treating active infection/contamination beyond 24 hours perioperative coverage  Assessed for rehab: Patient returned to prior level of function, rehabilitation not indicated at this time

## 2017-02-08 NOTE — PROGRESS NOTES
Pt brought down to CT scan. Monitor room notified. Signed consent with IV contrast brought down with chart.

## 2017-02-08 NOTE — CONSULTS
"General Surgery Consult    CHIEF COMPLAINT: flank pain and shortness of breath.     HISTORY OF PRESENT ILLNESS: The patient is a 36 y.o. female, who is on chronic dialysis for lupus related renal failure who presents with pain.   Her initial noncontrast abdominal CT was negative, however a CT chest to r/o PE shows portal venous gas.    The patient has been tachycardic since admission yesterday and her pain is about the same as on admission.    She describes right flank pain, shortness of breath and \"gurgling\" in her chest.    General surgery was consulted for evaluation and management.    There are no exacerbating nor relieving factors to her symptoms.    PAST MEDICAL HISTORY:  has a past medical history of Lupus (CMS-HCC); Fibromyalgia; Hypertension; Backpain; Avascular necrosis; Renal failure; Heart burn; Arthritis; Psychiatric disorder; UTI (urinary tract infection) (4/4/2013); Clostridium difficile colitis (5/3/2011); Pneumonia (); and Dialysis patient.     PAST SURGICAL HISTORY:  has past surgical history that includes gastroscopy-endo (4/10/2011); sclerotheraphy (4/10/2011); gastroscopy-endo (4/18/2011); colonoscopy with biopsy (4/20/2011); gastroscopy-endo (4/27/2011); other (5/2011); other abdominal surgery; av fistula creation (9/9/2014); cath placement (9/9/2014); av fistula creation (11/14/2014); av fistula creation (2/3/2015); hip arthroplasty total (Right, 1/18/2016); closed reduction (Right, 7/5/2016); av fistula creation (Right, 7/12/2016); thrombectomy (Right, 8/20/2016); thrombectomy (Right, 8/21/2016); angioplasty balloon (8/21/2016); and tracheostomy.     ALLERGIES:   Allergies   Allergen Reactions   • Morphine Itching     Tolerates Dilaudid   • Seasonal Runny Nose and Itching     Hay fever, sabiha brush        CURRENT MEDICATIONS:   Home Medications     Reviewed by Kasia Peace (Pharmacy Tech) on 02/06/17 at 0902  Med List Status: Complete    Medication Last Dose Status    " "Ascorbic Acid (VITAMIN C PO) 2/5/2017 Active    buPROPion (WELLBUTRIN XL) 150 MG XL tablet 2/5/2017 Active    calcium acetate (PHOSLO) 667 MG Cap > 2 weeks Active    cholecalciferol (VITAMIN D3) 5000 UNIT Cap 2/5/2017 Active    hydroxychloroquine (PLAQUENIL) 200 MG Tab 2/5/2017 Active    lidocaine (LIDODERM) 5 % Patch 2/4/2017 Active    Oxycodone HCl 20 MG Tab 2/6/2017 Active    pregabalin (LYRICA) 150 MG Cap 2/5/2017 Active    promethazine (PHENERGAN) 25 MG Tab 1/3/2017 Active    therapeutic multivitamin-minerals (THERAGRAN-M) Tab 2/5/2017 Active    tizanidine (ZANAFLEX) 4 MG Tab 2/4/2017 Active    trazodone (DESYREL) 50 MG Tab 2/4/2017 Active    warfarin (COUMADIN) 5 MG Tab 2/5/2017 Active                FAMILY HISTORY: History reviewed. No pertinent family history.     SOCIAL HISTORY:   Social History     Social History Main Topics   • Smoking status: Current Every Day Smoker -- 0.50 packs/day for 18 years     Types: Cigarettes     Last Attempt to Quit: 06/13/2011   • Smokeless tobacco: Never Used      Comment: 1/2 ppd   • Alcohol Use: No      Comment: occ   • Drug Use: No   • Sexual Activity: Not on file       REVIEW OF SYSTEMS: Comprehensive review of systems was not re accomplished. Please refer to previous History and Physical..     PHYSICAL EXAMINATION:     GENERAL: The patient is in no distress.   She is alert and oriented   VITAL SIGNS: Blood pressure 113/70, pulse 95, temperature 37.6 °C (99.7 °F), resp. rate 18, height 1.651 m (5' 5\"), weight 76.1 kg (167 lb 12.3 oz), last menstrual period 01/18/2017, SpO2 90 %.  HEAD AND NECK: Demonstrates symmetric, reactive pupils. Extraocular muscles   are intact. Nares and oropharynx are clear.   NECK: Supple. No adenopathy.  CHEST:No respiratory distress.    CARDIOVASCULAR:tachycardic   ABDOMEN: soft, non tender.   No guarding.  No rebound.   EXTREMITIES: Examination of the upper and lower extremities demonstrates no cyanosis edema or clubbing.  NEUROLOGIC: Alert & " oriented x 3, Normal motor function, Normal sensory function, No focal deficits noted.    LABORATORY VALUES:   Recent Labs      02/06/17 0628  02/07/17   0234   WBC  10.6  8.7   RBC  3.01*  3.13*   HEMOGLOBIN  9.2*  9.7*   HEMATOCRIT  29.0*  29.1*   MCV  96.3  93.0   MCH  30.6  31.0   MCHC  31.7*  33.3*   RDW  47.9  45.5   PLATELETCT  245  235   MPV  10.1  10.1     Recent Labs      02/06/17 0628  02/07/17   0234   SODIUM  136  137   POTASSIUM  3.3*  3.3*   CHLORIDE  107  102   CO2  11*  23   GLUCOSE  104*  76   BUN  95*  45*   CREATININE  10.25*  5.63*   CALCIUM  8.9  8.9     Recent Labs      02/06/17 0628 02/06/17   1012  02/07/17   0234   ASTSGOT  8*   --   9*   ALTSGPT  6   --   <5   TBILIRUBIN  0.3   --   0.4   ALKPHOSPHAT  65   --   60   GLOBULIN  3.5   --   3.5   INR   --   1.23*  1.20*     Recent Labs      02/06/17   1012  02/07/17   0234   INR  1.23*  1.20*        IMAGING:   CT-CTA CHEST PULMONARY ARTERY W/ RECONS   Final Result   Addendum 1 of 1   These findings were discussed with CONNIE GAONA on 2/7/2017 3:06 PM.      Final      1.  No CT evidence for pulmonary emboli.   2.  Small to moderate RIGHT pleural fluid collection, most likely loculated, with associated atelectasis.   3.  Diffuse pericardial thickening, concerning for pericarditis.   4.  Visualized liver shows portal venous gas which is new from prior abdominal CT on 2/6/2017 and concerning for acute intra-abdominal process.   5.  Mildly prominent mediastinal lymph node, nonspecific.            CT-RENAL COLIC EVALUATION(A/P W/O)   Final Result      1.  There is no evidence of nephrolithiasis, urolithiasis, or hydronephrosis.   2.  There is no acute inflammatory process within the abdomen or pelvis.   3.  There is a small right pleural effusion with bibasilar atelectasis.   4.  There is a small pericardial effusion.   5.  Evidence of previous granulomatous disease again seen with splenic calcifications.   6.  Small probable left  superior pole parapelvic cyst is unchanged.          IMPRESSION AND PLAN: Portal venous gas.    1.   The patient has a very benign abdominal examination and new onset portal venous gas.      This is a very concerning finding, however her examination is normal.        2.    I will tentatively place the patient on the OR schedule tomorrow am for a diagnostic laparoscopy.     I have also asked the hospital service to order a CT to be performed at 0500.    If this shows improvement we will cancel the case.   Should the gas persist or worsen we will proceed with diagnostic laparoscopy with possible laparotomy.     I counseled the patient for surgery this evening and she agreed to proceed fully informed.    3.    Should she worsen overnight, we will proceed with surgery sooner.      ____________________________________   Michael Hubbard M.D.    DD: 2/7/2017 DT: 4:43 PM

## 2017-02-08 NOTE — CARE PLAN
Problem: Communication  Goal: The ability to communicate needs accurately and effectively will improve  Intervention: Educate patient and significant other/support system about the plan of care, procedures, treatments, medications and allow for questions  Educate patient on lap procedure and HD      Problem: Psychosocial Needs:  Goal: Level of anxiety will decrease  Intervention: Identify and develop with patient strategies to cope with anxiety triggers  Decrease anxiety by having one on one conversation with patient.

## 2017-02-08 NOTE — PROGRESS NOTES
Hospital Medicine Progress Note, Adult, Complex               Author: Spike Gotti Date & Time created: 2017  6:24 PM     Interval History:  36 year old with ESRD, SLE, who stopped coming to dialysis and subsequently came in with uremia. The patient was dialyzed yesterday.    She states she had problems with transport and was unable to come to dialysis due to her many other commitments.     She was noted to have portal venous gas. Surgery following.    Review of Systems:  Review of Systems   Constitutional: Positive for malaise/fatigue.   Neurological: Positive for weakness.       Physical Exam:  Physical Exam   Constitutional: She appears well-developed. She appears ill.   Cardiovascular: Normal rate and regular rhythm.    Pulmonary/Chest: Effort normal and breath sounds normal.   Skin: Skin is warm and dry.       Labs:        Invalid input(s): [id number]      Recent Labs      17   0234   SODIUM  136  137   POTASSIUM  3.3*  3.3*   CHLORIDE  107  102   CO2  11*  23   BUN  95*  45*   CREATININE  10.25*  5.63*   MAGNESIUM  2.3   --    CALCIUM  8.9  8.9     Recent Labs      1728  17   0234   ALTSGPT  6  <5   ASTSGOT  8*  9*   ALKPHOSPHAT  65  60   TBILIRUBIN  0.3  0.4   GLUCOSE  104*  76     Recent Labs      1728  17   1012  17   0234   RBC  3.01*   --   3.13*   HEMOGLOBIN  9.2*   --   9.7*   HEMATOCRIT  29.0*   --   29.1*   PLATELETCT  245   --   235   PROTHROMBTM   --   15.9*  15.6*   INR   --   1.23*  1.20*     Recent Labs      1728  17   0234   WBC  10.6  8.7   NEUTSPOLYS  70.50  64.30   LYMPHOCYTES  14.10*  18.20*   MONOCYTES  12.10  14.50*   EOSINOPHILS  1.30  1.00   BASOPHILS  0.60  0.80   ASTSGOT  8*  9*   ALTSGPT  6  <5   ALKPHOSPHAT  65  60   TBILIRUBIN  0.3  0.4           Hemodynamics:  Temp (24hrs), Av.6 °C (99.7 °F), Min:37.3 °C (99.1 °F), Max:38.1 °C (100.6 °F)  Temperature: 37.6 °C (99.7 °F)  Pulse  Av.1   Min: 95  Max: 122  Blood Pressure: 113/70 mmHg     Respiratory:    Respiration: 18, Pulse Oximetry: 90 %, O2 Daily Delivery Respiratory : Silicone Nasal Cannula     Work Of Breathing / Effort: Mild;Shallow  RUL Breath Sounds: Clear, RML Breath Sounds: Clear, RLL Breath Sounds: Clear, LASHANDA Breath Sounds: Clear, LLL Breath Sounds: Clear  Fluids:  No intake or output data in the 24 hours ending 02/07/17 1824  Weight: 76.1 kg (167 lb 12.3 oz)  GI/Nutrition:  Orders Placed This Encounter   Procedures   • Diet Order     Standing Status: Standing      Number of Occurrences: 1      Standing Expiration Date:      Order Specific Question:  Diet:     Answer:  Renal [8]     Medical Decision Making, by Problem:  Active Hospital Problems    Diagnosis   • Fibromyalgia [M79.7]   • Hypokalemia [E87.6]   • Metabolic acidosis [E87.2]   • Hypertension [I10]   • ESRD on dialysis (CMS-[facility]) [N18.6, Z99.2]   • History of lupus nephritis [Z87.448]     1. ESRD - Noncompliant with HD. S/p HD yesterday, with improved lab parameters. HD planned to TIW schedule. Additional treatmets as needed.  2. Anemia - Epogen on dialysis    Will follow  Core Measures

## 2017-02-08 NOTE — DIETARY
"Nutrition Services: Poor PO and weight loss noted in admit screen  36 year old female admitted for metabolic acidosis, ESRD on dialysis, history of lupus nephritis, hypokalemia, HTN, fibromyalgia.  Pertinent past medical history includes: lupus, HTN, avascular necrosis, renal failure, C.diff colitis.  Attempted to visit with patient twice today however at each visit she was out of room.  She is currently on a renal diet and so far is eating % of some meals, with the majority of meals not recorded. Discussed intake with RN who reports patient is \"working on her lunch now\" and was previously NPO.     Ht: 5'5''  Wt: 168 pounds   BMI: 28.03   Pertinent Labs: Sodium: 134, Potassium: 3.5, Bun: 51, Creatinine: 6.36, Albumin: 3.1  Pertinent Meds: calcium acetate, cefepime, Kdur, potassium chloride, lyrica, trazadone, vitamin D, dilaudid (prn), roxicodone (prn), phenergan (prn)  Fluids: No IVF noted in chart  Skin: No skin breakdown noted in chart  GI: Last BM PTA      PLAN/RECOMMEND -   1) Nutrition rep to see patient daily for meal and snack preferences.  2) Encourage PO  3) Weekly weights to monitor fluid and nutrition status  4) Obtain supplement order per RD as needed.    RD following    "

## 2017-02-08 NOTE — PROGRESS NOTES
Called HD to update them on patients surgery schedule.  Will schedule dialysis for this afternoon.

## 2017-02-08 NOTE — PROGRESS NOTES
Received bedside report from NOC RN.  Patient is AOx4. Stand by assist. NPO Diet. BM PTA. Voids appropriately per bathroom.   PIV access noted. Oxygen RA.  Pain assessed and controlled per mar.  Bed alarm on.  Patient updated on plan of care. Bed rails up, bed in low position, call light in place, hourly rounding in place.   Continue to monitor labs.  Surgery scheduled today, HD in afternoon

## 2017-02-09 ENCOUNTER — APPOINTMENT (OUTPATIENT)
Dept: RADIOLOGY | Facility: MEDICAL CENTER | Age: 37
DRG: 683 | End: 2017-02-09
Attending: HOSPITALIST
Payer: MEDICARE

## 2017-02-09 LAB
ALBUMIN SERPL BCP-MCNC: 2.9 G/DL (ref 3.2–4.9)
ALBUMIN/GLOB SERPL: 0.8 G/DL
ALP SERPL-CCNC: 50 U/L (ref 30–99)
ALT SERPL-CCNC: 13 U/L (ref 2–50)
ANION GAP SERPL CALC-SCNC: 8 MMOL/L (ref 0–11.9)
AST SERPL-CCNC: 13 U/L (ref 12–45)
BILIRUB SERPL-MCNC: 0.4 MG/DL (ref 0.1–1.5)
BUN SERPL-MCNC: 25 MG/DL (ref 8–22)
CALCIUM SERPL-MCNC: 8.8 MG/DL (ref 8.5–10.5)
CHLORIDE SERPL-SCNC: 99 MMOL/L (ref 96–112)
CO2 SERPL-SCNC: 29 MMOL/L (ref 20–33)
CREAT SERPL-MCNC: 4.43 MG/DL (ref 0.5–1.4)
GFR SERPL CREATININE-BSD FRML MDRD: 11 ML/MIN/1.73 M 2
GLOBULIN SER CALC-MCNC: 3.5 G/DL (ref 1.9–3.5)
GLUCOSE SERPL-MCNC: 91 MG/DL (ref 65–99)
INR PPP: 1.46 (ref 0.87–1.13)
M TB TUBERC IFN-G BLD QL: NEGATIVE
M TB TUBERC IFN-G/MITOGEN IGNF BLD: 0
M TB TUBERC IGNF/MITOGEN IGNF CONTROL: 53.44 [IU]/ML
MAGNESIUM SERPL-MCNC: 2.1 MG/DL (ref 1.5–2.5)
MITOGEN IGNF BCKGRD COR BLD-ACNC: 0.05 [IU]/ML
PHOSPHATE SERPL-MCNC: 3.4 MG/DL (ref 2.5–4.5)
POTASSIUM SERPL-SCNC: 4.5 MMOL/L (ref 3.6–5.5)
PROT SERPL-MCNC: 6.4 G/DL (ref 6–8.2)
PROTHROMBIN TIME: 18.2 SEC (ref 12–14.6)
SODIUM SERPL-SCNC: 136 MMOL/L (ref 135–145)

## 2017-02-09 PROCEDURE — 700102 HCHG RX REV CODE 250 W/ 637 OVERRIDE(OP): Performed by: INTERNAL MEDICINE

## 2017-02-09 PROCEDURE — 770020 HCHG ROOM/CARE - TELE (206)

## 2017-02-09 PROCEDURE — 700102 HCHG RX REV CODE 250 W/ 637 OVERRIDE(OP)

## 2017-02-09 PROCEDURE — 700105 HCHG RX REV CODE 258: Performed by: HOSPITALIST

## 2017-02-09 PROCEDURE — 83735 ASSAY OF MAGNESIUM: CPT

## 2017-02-09 PROCEDURE — A9270 NON-COVERED ITEM OR SERVICE: HCPCS | Performed by: HOSPITALIST

## 2017-02-09 PROCEDURE — 700102 HCHG RX REV CODE 250 W/ 637 OVERRIDE(OP): Performed by: HOSPITALIST

## 2017-02-09 PROCEDURE — A9270 NON-COVERED ITEM OR SERVICE: HCPCS

## 2017-02-09 PROCEDURE — 700111 HCHG RX REV CODE 636 W/ 250 OVERRIDE (IP): Performed by: NURSE PRACTITIONER

## 2017-02-09 PROCEDURE — A9270 NON-COVERED ITEM OR SERVICE: HCPCS | Performed by: INTERNAL MEDICINE

## 2017-02-09 PROCEDURE — 700111 HCHG RX REV CODE 636 W/ 250 OVERRIDE (IP): Performed by: HOSPITALIST

## 2017-02-09 PROCEDURE — 700101 HCHG RX REV CODE 250: Performed by: HOSPITALIST

## 2017-02-09 PROCEDURE — 85610 PROTHROMBIN TIME: CPT

## 2017-02-09 PROCEDURE — 84100 ASSAY OF PHOSPHORUS: CPT

## 2017-02-09 PROCEDURE — 80053 COMPREHEN METABOLIC PANEL: CPT

## 2017-02-09 PROCEDURE — 99233 SBSQ HOSP IP/OBS HIGH 50: CPT | Performed by: HOSPITALIST

## 2017-02-09 PROCEDURE — 76604 US EXAM CHEST: CPT

## 2017-02-09 RX ORDER — AMOXICILLIN 250 MG
1 CAPSULE ORAL DAILY
Status: DISCONTINUED | OUTPATIENT
Start: 2017-02-10 | End: 2017-02-09

## 2017-02-09 RX ORDER — WARFARIN SODIUM 7.5 MG/1
7.5 TABLET ORAL
Status: DISCONTINUED | OUTPATIENT
Start: 2017-02-09 | End: 2017-02-11

## 2017-02-09 RX ORDER — OXYCODONE HCL 20 MG/1
20 TABLET, FILM COATED, EXTENDED RELEASE ORAL EVERY 12 HOURS
Status: DISCONTINUED | OUTPATIENT
Start: 2017-02-09 | End: 2017-02-11

## 2017-02-09 RX ORDER — AMOXICILLIN 250 MG
1 CAPSULE ORAL EVERY EVENING
Status: DISCONTINUED | OUTPATIENT
Start: 2017-02-09 | End: 2017-02-17 | Stop reason: HOSPADM

## 2017-02-09 RX ADMIN — AMPICILLIN SODIUM AND SULBACTAM SODIUM 3 G: 2; 1 INJECTION, POWDER, FOR SOLUTION INTRAMUSCULAR; INTRAVENOUS at 11:22

## 2017-02-09 RX ADMIN — HEPARIN SODIUM 5000 UNITS: 5000 INJECTION, SOLUTION INTRAVENOUS; SUBCUTANEOUS at 14:16

## 2017-02-09 RX ADMIN — PROMETHAZINE HYDROCHLORIDE 25 MG: 25 TABLET ORAL at 16:09

## 2017-02-09 RX ADMIN — Medication 667 MG: at 09:51

## 2017-02-09 RX ADMIN — HYDROMORPHONE HYDROCHLORIDE 0.5 MG: 1 INJECTION, SOLUTION INTRAMUSCULAR; INTRAVENOUS; SUBCUTANEOUS at 18:14

## 2017-02-09 RX ADMIN — PREGABALIN 25 MG: 25 CAPSULE ORAL at 09:51

## 2017-02-09 RX ADMIN — TIZANIDINE 4 MG: 4 TABLET ORAL at 20:52

## 2017-02-09 RX ADMIN — HYDROXYCHLOROQUINE SULFATE 200 MG: 200 TABLET ORAL at 20:52

## 2017-02-09 RX ADMIN — AMPICILLIN SODIUM AND SULBACTAM SODIUM 3 G: 2; 1 INJECTION, POWDER, FOR SOLUTION INTRAMUSCULAR; INTRAVENOUS at 18:13

## 2017-02-09 RX ADMIN — HEPARIN SODIUM 5000 UNITS: 5000 INJECTION, SOLUTION INTRAVENOUS; SUBCUTANEOUS at 20:51

## 2017-02-09 RX ADMIN — HYDROMORPHONE HYDROCHLORIDE 0.5 MG: 1 INJECTION, SOLUTION INTRAMUSCULAR; INTRAVENOUS; SUBCUTANEOUS at 04:18

## 2017-02-09 RX ADMIN — STANDARDIZED SENNA CONCENTRATE AND DOCUSATE SODIUM 1 TABLET: 8.6; 5 TABLET, FILM COATED ORAL at 20:59

## 2017-02-09 RX ADMIN — OXYCODONE HYDROCHLORIDE 20 MG: 10 TABLET ORAL at 16:05

## 2017-02-09 RX ADMIN — LIDOCAINE 1 PATCH: 50 PATCH CUTANEOUS at 20:52

## 2017-02-09 RX ADMIN — POTASSIUM CHLORIDE 20 MEQ: 1500 TABLET, EXTENDED RELEASE ORAL at 09:00

## 2017-02-09 RX ADMIN — TRAZODONE HYDROCHLORIDE 50 MG: 50 TABLET ORAL at 20:52

## 2017-02-09 RX ADMIN — Medication 667 MG: at 18:13

## 2017-02-09 RX ADMIN — HYDROMORPHONE HYDROCHLORIDE 0.5 MG: 1 INJECTION, SOLUTION INTRAMUSCULAR; INTRAVENOUS; SUBCUTANEOUS at 14:14

## 2017-02-09 RX ADMIN — BUPROPION HYDROCHLORIDE 150 MG: 150 TABLET, FILM COATED, EXTENDED RELEASE ORAL at 11:00

## 2017-02-09 RX ADMIN — OXYCODONE HYDROCHLORIDE 20 MG: 10 TABLET ORAL at 07:35

## 2017-02-09 RX ADMIN — VITAMIN D, TAB 1000IU (100/BT) 10000 UNITS: 25 TAB at 09:50

## 2017-02-09 RX ADMIN — OXYCODONE HYDROCHLORIDE 20 MG: 10 TABLET ORAL at 11:58

## 2017-02-09 RX ADMIN — HEPARIN SODIUM 5000 UNITS: 5000 INJECTION, SOLUTION INTRAVENOUS; SUBCUTANEOUS at 06:11

## 2017-02-09 RX ADMIN — OXYCODONE HYDROCHLORIDE 20 MG: 20 TABLET, FILM COATED, EXTENDED RELEASE ORAL at 20:52

## 2017-02-09 RX ADMIN — OXYCODONE HYDROCHLORIDE 20 MG: 10 TABLET ORAL at 03:05

## 2017-02-09 RX ADMIN — WARFARIN SODIUM 7.5 MG: 7.5 TABLET ORAL at 18:14

## 2017-02-09 RX ADMIN — OXYCODONE HYDROCHLORIDE 20 MG: 10 TABLET ORAL at 23:59

## 2017-02-09 RX ADMIN — HYDROMORPHONE HYDROCHLORIDE 0.5 MG: 1 INJECTION, SOLUTION INTRAMUSCULAR; INTRAVENOUS; SUBCUTANEOUS at 22:24

## 2017-02-09 RX ADMIN — PREGABALIN 50 MG: 25 CAPSULE ORAL at 20:51

## 2017-02-09 RX ADMIN — HYDROMORPHONE HYDROCHLORIDE 0.5 MG: 1 INJECTION, SOLUTION INTRAMUSCULAR; INTRAVENOUS; SUBCUTANEOUS at 09:52

## 2017-02-09 RX ADMIN — PROMETHAZINE HYDROCHLORIDE 25 MG: 25 TABLET ORAL at 06:11

## 2017-02-09 RX ADMIN — AMPICILLIN SODIUM AND SULBACTAM SODIUM 3 G: 2; 1 INJECTION, POWDER, FOR SOLUTION INTRAMUSCULAR; INTRAVENOUS at 06:11

## 2017-02-09 ASSESSMENT — PAIN SCALES - GENERAL
PAINLEVEL_OUTOF10: 8

## 2017-02-09 ASSESSMENT — ENCOUNTER SYMPTOMS
MUSCULOSKELETAL NEGATIVE: 1
PSYCHIATRIC NEGATIVE: 1
FLANK PAIN: 1
FOCAL WEAKNESS: 1
CARDIOVASCULAR NEGATIVE: 1
SPUTUM PRODUCTION: 0
CHILLS: 0
FEVER: 0
EYES NEGATIVE: 1
ABDOMINAL PAIN: 1
WEAKNESS: 1
DIZZINESS: 0
VOMITING: 0
COUGH: 1

## 2017-02-09 NOTE — PROGRESS NOTES
Hospital Medicine Progress Note, Adult, Complex               Author: Spike Gotti Date & Time created: 2017  2:29 PM     Interval History:  36 year old with ESRD, SLE, who stopped coming to dialysis and subsequently came in with uremia.     Apparently no longer interested in Nocturnal HD.      Review of Systems:  Review of Systems   Constitutional: Positive for malaise/fatigue.   Neurological: Positive for weakness.       Physical Exam:  Physical Exam   Constitutional: She appears well-developed. She appears ill.   Cardiovascular: Normal rate and regular rhythm.    Pulmonary/Chest: Effort normal and breath sounds normal.   Skin: Skin is warm and dry.       Labs:        Invalid input(s): [id number]      Recent Labs      17   02517   0300   SODIUM  137  134*  136   POTASSIUM  3.3*  3.5*  4.5   CHLORIDE  102  97  99   CO2  23  26  29   BUN  45*  51*  25*   CREATININE  5.63*  6.36*  4.43*   MAGNESIUM   --   2.0  2.1   PHOSPHORUS   --   4.5  3.4   CALCIUM  8.9  9.0  8.8     Recent Labs      17   0251  17   0300   ALTSGPT  <5  10  13   ASTSGOT  9*  14  13   ALKPHOSPHAT  60  53  50   TBILIRUBIN  0.4  0.3  0.4   LIPASE  32   --    --    GLUCOSE  76  92  91     Recent Labs      17   0251  17   0300   RBC  3.13*  2.83*   --    HEMOGLOBIN  9.7*  8.9*   --    HEMATOCRIT  29.1*  27.3*   --    PLATELETCT  235  200   --    PROTHROMBTM  15.6*  18.8*  18.2*   INR  1.20*  1.52*  1.46*     Recent Labs      17   0251  17   0300   WBC  8.7  6.3   --    NEUTSPOLYS  64.30  66.40   --    LYMPHOCYTES  18.20*  26.50   --    MONOCYTES  14.50*  5.30   --    EOSINOPHILS  1.00  1.80   --    BASOPHILS  0.80  0.00   --    ASTSGOT  9*  14  13   ALTSGPT  <5  10  13   ALKPHOSPHAT  60  53  50   TBILIRUBIN  0.4  0.3  0.4           Hemodynamics:  Temp (24hrs), Av.1 °C (98.7 °F), Min:36.7 °C (98.1 °F), Max:37.3 °C (99.1  °F)  Temperature: 36.7 °C (98.1 °F)  Pulse  Av.4  Min: 81  Max: 122  Blood Pressure: 107/88 mmHg     Respiratory:    Respiration: 14, Pulse Oximetry: 96 %        RUL Breath Sounds: Clear, RML Breath Sounds: Clear, RLL Breath Sounds: Clear, LASHANDA Breath Sounds: Clear, LLL Breath Sounds: Clear  Fluids:  No intake or output data in the 24 hours ending 17 1429     GI/Nutrition:  Orders Placed This Encounter   Procedures   • DIET ORDER     Standing Status: Standing      Number of Occurrences: 1      Standing Expiration Date:      Order Specific Question:  Diet:     Answer:  Renal [8]     Medical Decision Making, by Problem:  Active Hospital Problems    Diagnosis   • Fibromyalgia [M79.7]   • Hypokalemia [E87.6]   • Metabolic acidosis [E87.2]   • Hypertension [I10]   • ESRD on dialysis (CMS-[facility]) [N18.6, Z99.2]   • History of lupus nephritis [Z87.448]     1. ESRD - Noncompliant with HD. Frankly uremic. was interested in nocturnal HD, no longer  2. Anemia - Epogen on dialysis  3. Pericardial effusion - History of SLE, was also uremic due to noncompliance with HD    -Dialysis SW notified me that she is seeking to move to another HD unit and possibly finding another nephrologist.  -Will await her decision; continue looking for outpatient HD units to accept patient.   Core Measures

## 2017-02-09 NOTE — PROGRESS NOTES
LOCULATED RIGHT PLEURAL EFFUSION. DIAGNOSTIC AND THERAPEUTIC RIGHT THORACENTESIS REQUESTED    RADIOLOGIST:   SONOGRAPHER: ALICIA    US FINDINGS:  UNABLE TO PERFORM PROCEDURE. SMALL RT PLEURAL EFFUSION WITH ATELECTASIS/LUNG IN FIELD OF VIEW WHERE GOOD WINDOW FOR PROCEDURE IS. THIS IS VERY DYNAMIC AND APPEARS UNSAFE AT THIS POINT PER .  HAS SPOKEN TO DRAnabel FOR THIS PATIENT REGARDING ALL THESE DETAILS. IF ACCUMULATION OF MORE FLUID PERSISTS, WE CAN TRY AGAIN.

## 2017-02-09 NOTE — PROGRESS NOTES
Inpatient Anticoagulation Service Note    Date: 2017  Reason for Anticoagulation: Deep Vein Thrombosis, Other (Comments) (Hx SLE)        Hemoglobin Value: 8.9  Hematocrit Value: 27.3  Lab Platelet Value: 200  Target INR: 2.0 to 3.0    INR from last 7 days     Date/Time INR Value    17 0300 (!)1.46    17 0251 (!)1.52    17 0234 (!)1.2    17 1012 (!)1.23        Dose from last 7 days     Date/Time Dose (mg)    17 1100 7.5    17 1200 0    17 1200 7.5    17 0800 7.5        Average Dose (mg):  (Home Dose: 7.5 mg Holcomb/Tu and 5 mg ROW per chart review)  Significant Interactions: Antibiotics, Other (Comments) (calcium acetate, buproprion, SQ heparin, hydroxychloroquine)  Bridge Therapy: No (Heparin 5000 units SQ Q 8 hours)     Reversal Agent Administered: Not Applicable  Comments: Warfarin not administered.  Will give 7.5 mg tonight.  INR again in AM.  Resume 7.5 mg/5 mg daily soon (home dose)    Plan:  7.5 mg tonight  Education Material Provided?: Yes (no questions, pt declines packet)  Pharmacist suggested discharge dosin.5 mg Holcomb, Tu and 5 mg daily rest of week.     Antonio Romero, PharmD, BCPS

## 2017-02-09 NOTE — PROGRESS NOTES
Handoff received. MWF dialysis Pt. 0800 thoracentesis today. C/O SOB, RT side flank pain, malaise. Dx: metabolic acidosis. Pt had missed 1-2 weeks of dialysis. Hx of fibromyalgia, lupus nephritis, HTN, avascular necrosis RT hip Sx. Pt is AOx4, NAD. RUE graft and LT port cath (accessed). Bed low and locked, call bell in reach. SR on tele.

## 2017-02-09 NOTE — PROGRESS NOTES
Hospital Medicine Progress Note, Adult, Complex               Author: Dannie Triciaernesto Date & Time created: 2/8/2017  9:04 PM     Interval History:  Patient seen and examined today.    Patient tolerating treatment and therapies.  All Data, Medication data reviewed.  Case discussed with nursing as available.  Plan of Care reviewed with patient and notified of changes.  2/7 Pt admitted with generalized pain, R flank pain and malaise, noncompliant with Dialysis and RX, INR low, though states she take coumadin  Asking for more pain RX, CT chest ordered, gas in the portal vein noted, consulted surgery for eval  Concern for intraabdominal infection, process..  kindly evaluated  2/8 Pt tearful again today, still with pain R chest and back, explained Ct findings and suggested R pleural tap, Pt agree's, still genralized chronic pain, for RRT today, d/w RN    Review of Systems:  Review of Systems   Constitutional: Positive for malaise/fatigue. Negative for fever and chills.   HENT: Negative.    Eyes: Negative.    Respiratory: Positive for cough. Negative for sputum production.    Cardiovascular: Negative.    Gastrointestinal: Positive for abdominal pain. Negative for vomiting.   Genitourinary: Positive for flank pain. Negative for urgency.   Musculoskeletal: Negative.    Skin: Negative.  Negative for rash.   Neurological: Positive for focal weakness and weakness. Negative for dizziness.   Endo/Heme/Allergies: Negative.    Psychiatric/Behavioral: Negative.    All other systems reviewed and are negative.      Physical Exam:  Physical Exam   Constitutional: She is oriented to person, place, and time. She appears well-developed and well-nourished.   HENT:   Head: Normocephalic and atraumatic.   Nose: Nose normal.   Mouth/Throat: Oropharynx is clear and moist.   Eyes: Conjunctivae and EOM are normal. Pupils are equal, round, and reactive to light.   Neck: Normal range of motion. Neck supple. No JVD present. No  thyromegaly present.   Cardiovascular: Normal rate, regular rhythm and normal heart sounds.  Exam reveals no gallop and no friction rub.    Pulmonary/Chest: Effort normal and breath sounds normal. No respiratory distress. She has no wheezes. She has no rales.   L sided port   Abdominal: Soft. She exhibits no distension and no mass. There is tenderness. There is no rebound and no guarding.   Musculoskeletal: Normal range of motion. She exhibits edema. She exhibits no tenderness.   Lymphadenopathy:     She has no cervical adenopathy.   Neurological: She is alert and oriented to person, place, and time. No cranial nerve deficit.   Skin: Skin is warm and dry. She is not diaphoretic.   Psychiatric: She has a normal mood and affect. Her behavior is normal.   Nursing note and vitals reviewed.      Labs:        Invalid input(s): [id number]      Recent Labs      02/06/17 0628 02/07/17 0234 02/08/17 0251   SODIUM  136  137  134*   POTASSIUM  3.3*  3.3*  3.5*   CHLORIDE  107  102  97   CO2  11*  23  26   BUN  95*  45*  51*   CREATININE  10.25*  5.63*  6.36*   MAGNESIUM  2.3   --   2.0   PHOSPHORUS   --    --   4.5   CALCIUM  8.9  8.9  9.0     Recent Labs      02/06/17 0628 02/07/17 0234 02/08/17   0251   ALTSGPT  6  <5  10   ASTSGOT  8*  9*  14   ALKPHOSPHAT  65  60  53   TBILIRUBIN  0.3  0.4  0.3   LIPASE   --   32   --    GLUCOSE  104*  76  92     Recent Labs      02/06/17 0628 02/06/17   1012  02/07/17 0234 02/08/17   0251   RBC  3.01*   --   3.13*  2.83*   HEMOGLOBIN  9.2*   --   9.7*  8.9*   HEMATOCRIT  29.0*   --   29.1*  27.3*   PLATELETCT  245   --   235  200   PROTHROMBTM   --   15.9*  15.6*  18.8*   INR   --   1.23*  1.20*  1.52*     Recent Labs      02/06/17 0628 02/07/17 0234  02/08/17   0251   WBC  10.6  8.7  6.3   NEUTSPOLYS  70.50  64.30  66.40   LYMPHOCYTES  14.10*  18.20*  26.50   MONOCYTES  12.10  14.50*  5.30   EOSINOPHILS  1.30  1.00  1.80   BASOPHILS  0.60  0.80  0.00   ASTSGOT   8*  9*  14   ALTSGPT  6  <5  10   ALKPHOSPHAT  65  60  53   TBILIRUBIN  0.3  0.4  0.3           Hemodynamics:  Temp (24hrs), Av.2 °C (98.9 °F), Min:36.7 °C (98.1 °F), Max:37.7 °C (99.9 °F)  Temperature: 37.2 °C (98.9 °F)  Pulse  Av.6  Min: 81  Max: 122  Blood Pressure: 109/60 mmHg     Respiratory:    Respiration: 16, Pulse Oximetry: 92 %     Work Of Breathing / Effort: Mild  RUL Breath Sounds: Clear, RML Breath Sounds: Clear, RLL Breath Sounds: Clear, LASHANDA Breath Sounds: Clear, LLL Breath Sounds: Clear  Fluids:    Intake/Output Summary (Last 24 hours) at 17 2104  Last data filed at 17 1346   Gross per 24 hour   Intake    680 ml   Output   2650 ml   Net  -1970 ml        GI/Nutrition:  Orders Placed This Encounter   Procedures   • DIET ORDER     Standing Status: Standing      Number of Occurrences: 1      Standing Expiration Date:      Order Specific Question:  Diet:     Answer:  Renal [8]     Medical Decision Making, by Problem:  Active Hospital Problems    Diagnosis   • Abdominal pain [R10.9], CT without contrast negative, f/u CT chest with portal vein gas  F/u scan benign, surgery evaluated and found not an issue    R pleural fluid with loculation, ? Infectious vs other     • UTI (urinary tract infection) [N39.0] on Abx, f/u CX's, entercoccus  Faec., sens to Amp   • Fibromyalgia [M79.7] chr. 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 Disease  Plan  Surgery eval appreciated  Change abx to unasyn  R pleural tap  F/u labs  C/w  current RX and abx  See orders  Pt medically complex  Time spent 32 min.  Medications reviewed, Radiology images reviewed and Labs reviewed  Taylor catheter: No Taylor  Central line in place: Need for access    DVT Prophylaxis: Warfarin (Coumadin) and Heparin      Antibiotics: Treating active infection/contamination beyond 24 hours perioperative coverage  Assessed for rehab: Patient returned to prior level of function, rehabilitation not indicated at this time

## 2017-02-09 NOTE — PROGRESS NOTES
Hospital Medicine Progress Note, Adult, Complex               Author: Spike Gotti Date & Time created: 2/8/2017  4:06 PM     Interval History:  36 year old with ESRD, SLE, who stopped coming to dialysis and subsequently came in with uremia. The patient was dialyzed yesterday.  She states she had problems with transport and was unable to come to dialysis due to her many other commitments.   Discussed with patient. Complains of chronic pain.     Review of Systems:  Review of Systems   Constitutional: Positive for malaise/fatigue.   Neurological: Positive for weakness.       Physical Exam:  Physical Exam   Constitutional: She appears well-developed. She appears ill.   Cardiovascular: Normal rate and regular rhythm.    Pulmonary/Chest: Effort normal and breath sounds normal.   Skin: Skin is warm and dry.       Labs:        Invalid input(s): [id number]      Recent Labs      02/06/17 0628 02/07/17 0234 02/08/17 0251   SODIUM  136  137  134*   POTASSIUM  3.3*  3.3*  3.5*   CHLORIDE  107  102  97   CO2  11*  23  26   BUN  95*  45*  51*   CREATININE  10.25*  5.63*  6.36*   MAGNESIUM  2.3   --   2.0   PHOSPHORUS   --    --   4.5   CALCIUM  8.9  8.9  9.0     Recent Labs      02/06/17 0628 02/07/17 0234 02/08/17 0251   ALTSGPT  6  <5  10   ASTSGOT  8*  9*  14   ALKPHOSPHAT  65  60  53   TBILIRUBIN  0.3  0.4  0.3   LIPASE   --   32   --    GLUCOSE  104*  76  92     Recent Labs      02/06/17 0628 02/06/17   1012  02/07/17 0234 02/08/17 0251   RBC  3.01*   --   3.13*  2.83*   HEMOGLOBIN  9.2*   --   9.7*  8.9*   HEMATOCRIT  29.0*   --   29.1*  27.3*   PLATELETCT  245   --   235  200   PROTHROMBTM   --   15.9*  15.6*  18.8*   INR   --   1.23*  1.20*  1.52*     Recent Labs      02/06/17 0628 02/07/17 0234 02/08/17 0251   WBC  10.6  8.7  6.3   NEUTSPOLYS  70.50  64.30  66.40   LYMPHOCYTES  14.10*  18.20*  26.50   MONOCYTES  12.10  14.50*  5.30   EOSINOPHILS  1.30  1.00  1.80   BASOPHILS  0.60   0.80  0.00   ASTSGOT  8*  9*  14   ALTSGPT  6  <5  10   ALKPHOSPHAT  65  60  53   TBILIRUBIN  0.3  0.4  0.3           Hemodynamics:  Temp (24hrs), Av °C (98.6 °F), Min:36.7 °C (98 °F), Max:37.7 °C (99.9 °F)  Temperature: 37.7 °C (99.9 °F)  Pulse  Av.9  Min: 81  Max: 122  Blood Pressure: 118/61 mmHg     Respiratory:    Respiration: 18, Pulse Oximetry: 93 %     Work Of Breathing / Effort: Mild  RUL Breath Sounds: Clear, RML Breath Sounds: Clear, RLL Breath Sounds: Clear, LASHANDA Breath Sounds: Clear, LLL Breath Sounds: Clear  Fluids:    Intake/Output Summary (Last 24 hours) at 17 1606  Last data filed at 17 1346   Gross per 24 hour   Intake    680 ml   Output   2650 ml   Net  -1970 ml     Weight: 76.4 kg (168 lb 6.9 oz)  GI/Nutrition:  Orders Placed This Encounter   Procedures   • DIET ORDER     Standing Status: Standing      Number of Occurrences: 1      Standing Expiration Date:      Order Specific Question:  Diet:     Answer:  Renal [8]     Medical Decision Making, by Problem:  Active Hospital Problems    Diagnosis   • Fibromyalgia [M79.7]   • Hypokalemia [E87.6]   • Metabolic acidosis [E87.2]   • Hypertension [I10]   • ESRD on dialysis (CMS-[facility]) [N18.6, Z99.2]   • History of lupus nephritis [Z87.448]     1. ESRD - Noncompliant with HD. Frankly uremic. S/p HD, now HD TIW.  2. Anemia - Epogen on dialysis  3. Pericardial effusion - History of SLE, was also uremic due to noncompliance with HD    Will follow  Core Measures

## 2017-02-09 NOTE — DISCHARGE PLANNING
Met with pt again to discuss OP dialysis options.  Pt has decided that she is agreeable to return to in-center OP dialysis, but would like to switch her nephrologist and dialysis center to [facility].  Dr. Gotti notified and LM for Radha at [facility] to initiate transfer of care.  Provided pt with list of dialysis centers in [facility] for review, and will meet with her again to obtain clinic choice.  Will follow.

## 2017-02-09 NOTE — PROGRESS NOTES
Received report from day RN assumed care at 1915. Pt A&Ox 4 . Pt states 8 /10 pain in her back and hip at this time. Plan of care discussed with Pt, verbalized understanding. No family present at bedside. Assessment completed. All Pt needs met at this time. Call light within reach, bed alarm on , bed locked and in low position. Will continue to monitor.

## 2017-02-10 LAB
ABO GROUP BLD: NORMAL
ANION GAP SERPL CALC-SCNC: 10 MMOL/L (ref 0–11.9)
BARCODED ABORH UBTYP: 6200
BARCODED PRD CODE UBPRD: NORMAL
BARCODED UNIT NUM UBUNT: NORMAL
BLD GP AB INVEST PLASRBC-IMP: NORMAL
BLD GP AB SCN SERPL QL: NORMAL
BUN SERPL-MCNC: 36 MG/DL (ref 8–22)
CALCIUM SERPL-MCNC: 8.5 MG/DL (ref 8.5–10.5)
CHLORIDE SERPL-SCNC: 99 MMOL/L (ref 96–112)
CO2 SERPL-SCNC: 27 MMOL/L (ref 20–33)
COMPONENT R 8504R: NORMAL
CREAT SERPL-MCNC: 6.13 MG/DL (ref 0.5–1.4)
CRP SERPL HS-MCNC: 37.4 MG/L (ref 0–7.5)
DAT C3D-SP REAG RBC QL: NORMAL
DAT IGG-SP REAG RBC QL: NORMAL
EKG IMPRESSION: NORMAL
ERYTHROCYTE [DISTWIDTH] IN BLOOD BY AUTOMATED COUNT: 49 FL (ref 35.9–50)
ERYTHROCYTE [SEDIMENTATION RATE] IN BLOOD BY WESTERGREN METHOD: >120 MM/HOUR (ref 0–20)
FERRITIN SERPL-MCNC: 135.2 NG/ML (ref 10–291)
GFR SERPL CREATININE-BSD FRML MDRD: 8 ML/MIN/1.73 M 2
GLUCOSE SERPL-MCNC: 123 MG/DL (ref 65–99)
HBV SURFACE AB SERPL IA-ACNC: <3.1 MIU/ML (ref 0–10)
HCT VFR BLD AUTO: 20.9 % (ref 37–47)
HGB BLD-MCNC: 6.5 G/DL (ref 12–16)
IRON SATN MFR SERPL: 10 % (ref 15–55)
IRON SERPL-MCNC: 21 UG/DL (ref 40–170)
LDH SERPL-CCNC: 111 U/L (ref 107–266)
MCH RBC QN AUTO: 30.6 PG (ref 27–33)
MCHC RBC AUTO-ENTMCNC: 30.3 G/DL (ref 33.6–35)
MCV RBC AUTO: 101 FL (ref 81.4–97.8)
PLATELET # BLD AUTO: 178 K/UL (ref 164–446)
PMV BLD AUTO: 10 FL (ref 9–12.9)
POTASSIUM SERPL-SCNC: 4.4 MMOL/L (ref 3.6–5.5)
PRODUCT TYPE UPROD: NORMAL
RBC # BLD AUTO: 2.09 M/UL (ref 4.2–5.4)
RH BLD: NORMAL
SODIUM SERPL-SCNC: 136 MMOL/L (ref 135–145)
TIBC SERPL-MCNC: 210 UG/DL (ref 250–450)
TROPONIN I SERPL-MCNC: <0.01 NG/ML (ref 0–0.04)
UNIT STATUS USTAT: NORMAL
WBC # BLD AUTO: 5.9 K/UL (ref 4.8–10.8)

## 2017-02-10 PROCEDURE — 700111 HCHG RX REV CODE 636 W/ 250 OVERRIDE (IP): Performed by: HOSPITALIST

## 2017-02-10 PROCEDURE — 99232 SBSQ HOSP IP/OBS MODERATE 35: CPT | Performed by: HOSPITALIST

## 2017-02-10 PROCEDURE — A9270 NON-COVERED ITEM OR SERVICE: HCPCS | Performed by: INTERNAL MEDICINE

## 2017-02-10 PROCEDURE — 30233N1 TRANSFUSION OF NONAUTOLOGOUS RED BLOOD CELLS INTO PERIPHERAL VEIN, PERCUTANEOUS APPROACH: ICD-10-PCS | Performed by: INTERNAL MEDICINE

## 2017-02-10 PROCEDURE — 84484 ASSAY OF TROPONIN QUANT: CPT

## 2017-02-10 PROCEDURE — A9270 NON-COVERED ITEM OR SERVICE: HCPCS | Performed by: HOSPITALIST

## 2017-02-10 PROCEDURE — 36430 TRANSFUSION BLD/BLD COMPNT: CPT

## 2017-02-10 PROCEDURE — 700111 HCHG RX REV CODE 636 W/ 250 OVERRIDE (IP): Performed by: NURSE PRACTITIONER

## 2017-02-10 PROCEDURE — 85027 COMPLETE CBC AUTOMATED: CPT

## 2017-02-10 PROCEDURE — 93010 ELECTROCARDIOGRAM REPORT: CPT | Performed by: INTERNAL MEDICINE

## 2017-02-10 PROCEDURE — 700101 HCHG RX REV CODE 250: Performed by: HOSPITALIST

## 2017-02-10 PROCEDURE — 700111 HCHG RX REV CODE 636 W/ 250 OVERRIDE (IP)

## 2017-02-10 PROCEDURE — 700102 HCHG RX REV CODE 250 W/ 637 OVERRIDE(OP): Performed by: INTERNAL MEDICINE

## 2017-02-10 PROCEDURE — P9016 RBC LEUKOCYTES REDUCED: HCPCS

## 2017-02-10 PROCEDURE — 700102 HCHG RX REV CODE 250 W/ 637 OVERRIDE(OP)

## 2017-02-10 PROCEDURE — A9270 NON-COVERED ITEM OR SERVICE: HCPCS

## 2017-02-10 PROCEDURE — 93005 ELECTROCARDIOGRAM TRACING: CPT | Performed by: HOSPITALIST

## 2017-02-10 PROCEDURE — 86850 RBC ANTIBODY SCREEN: CPT

## 2017-02-10 PROCEDURE — 86880 COOMBS TEST DIRECT: CPT | Mod: 91

## 2017-02-10 PROCEDURE — 770020 HCHG ROOM/CARE - TELE (206)

## 2017-02-10 PROCEDURE — 86900 BLOOD TYPING SEROLOGIC ABO: CPT

## 2017-02-10 PROCEDURE — 700111 HCHG RX REV CODE 636 W/ 250 OVERRIDE (IP): Performed by: INTERNAL MEDICINE

## 2017-02-10 PROCEDURE — 85652 RBC SED RATE AUTOMATED: CPT

## 2017-02-10 PROCEDURE — 86902 BLOOD TYPE ANTIGEN DONOR EA: CPT

## 2017-02-10 PROCEDURE — 86141 C-REACTIVE PROTEIN HS: CPT

## 2017-02-10 PROCEDURE — 86922 COMPATIBILITY TEST ANTIGLOB: CPT

## 2017-02-10 PROCEDURE — 700102 HCHG RX REV CODE 250 W/ 637 OVERRIDE(OP): Performed by: HOSPITALIST

## 2017-02-10 PROCEDURE — 83010 ASSAY OF HAPTOGLOBIN QUANT: CPT

## 2017-02-10 PROCEDURE — 700105 HCHG RX REV CODE 258: Performed by: HOSPITALIST

## 2017-02-10 PROCEDURE — 90935 HEMODIALYSIS ONE EVALUATION: CPT

## 2017-02-10 PROCEDURE — 83550 IRON BINDING TEST: CPT

## 2017-02-10 PROCEDURE — 86870 RBC ANTIBODY IDENTIFICATION: CPT | Mod: 91

## 2017-02-10 PROCEDURE — 83540 ASSAY OF IRON: CPT

## 2017-02-10 PROCEDURE — 86901 BLOOD TYPING SEROLOGIC RH(D): CPT

## 2017-02-10 PROCEDURE — 700105 HCHG RX REV CODE 258

## 2017-02-10 PROCEDURE — 82728 ASSAY OF FERRITIN: CPT

## 2017-02-10 PROCEDURE — 86706 HEP B SURFACE ANTIBODY: CPT

## 2017-02-10 PROCEDURE — 83615 LACTATE (LD) (LDH) ENZYME: CPT

## 2017-02-10 PROCEDURE — 80048 BASIC METABOLIC PNL TOTAL CA: CPT

## 2017-02-10 RX ORDER — COLCHICINE 0.6 MG/1
0.6 TABLET ORAL 2 TIMES DAILY
Status: DISCONTINUED | OUTPATIENT
Start: 2017-02-10 | End: 2017-02-10

## 2017-02-10 RX ORDER — INDOMETHACIN 50 MG/1
50 CAPSULE ORAL
Status: DISCONTINUED | OUTPATIENT
Start: 2017-02-10 | End: 2017-02-10

## 2017-02-10 RX ORDER — SODIUM CHLORIDE 9 MG/ML
INJECTION, SOLUTION INTRAVENOUS
Status: COMPLETED
Start: 2017-02-10 | End: 2017-02-10

## 2017-02-10 RX ORDER — PREDNISONE 20 MG/1
60 TABLET ORAL DAILY
Status: DISCONTINUED | OUTPATIENT
Start: 2017-02-10 | End: 2017-02-13

## 2017-02-10 RX ORDER — HEPARIN SODIUM 1000 [USP'U]/ML
INJECTION, SOLUTION INTRAVENOUS; SUBCUTANEOUS
Status: COMPLETED
Start: 2017-02-10 | End: 2017-02-10

## 2017-02-10 RX ORDER — COLCHICINE 0.6 MG/1
0.6 TABLET ORAL ONCE
Status: COMPLETED | OUTPATIENT
Start: 2017-02-10 | End: 2017-02-10

## 2017-02-10 RX ADMIN — AMPICILLIN SODIUM AND SULBACTAM SODIUM 3 G: 2; 1 INJECTION, POWDER, FOR SOLUTION INTRAMUSCULAR; INTRAVENOUS at 06:30

## 2017-02-10 RX ADMIN — OXYCODONE HYDROCHLORIDE 20 MG: 10 TABLET ORAL at 22:31

## 2017-02-10 RX ADMIN — HEPARIN SODIUM 5000 UNITS: 5000 INJECTION, SOLUTION INTRAVENOUS; SUBCUTANEOUS at 14:00

## 2017-02-10 RX ADMIN — TRAZODONE HYDROCHLORIDE 50 MG: 50 TABLET ORAL at 20:30

## 2017-02-10 RX ADMIN — HYDROXYCHLOROQUINE SULFATE 200 MG: 200 TABLET ORAL at 20:30

## 2017-02-10 RX ADMIN — PROMETHAZINE HYDROCHLORIDE 25 MG: 25 TABLET ORAL at 17:48

## 2017-02-10 RX ADMIN — HYDROMORPHONE HYDROCHLORIDE 0.5 MG: 1 INJECTION, SOLUTION INTRAMUSCULAR; INTRAVENOUS; SUBCUTANEOUS at 07:22

## 2017-02-10 RX ADMIN — ERYTHROPOIETIN 10000 UNITS: 10000 INJECTION, SOLUTION INTRAVENOUS; SUBCUTANEOUS at 12:15

## 2017-02-10 RX ADMIN — HYDROMORPHONE HYDROCHLORIDE 0.5 MG: 1 INJECTION, SOLUTION INTRAMUSCULAR; INTRAVENOUS; SUBCUTANEOUS at 11:29

## 2017-02-10 RX ADMIN — OXYCODONE HYDROCHLORIDE 20 MG: 10 TABLET ORAL at 09:06

## 2017-02-10 RX ADMIN — HEPARIN SODIUM 2000 UNITS: 1000 INJECTION, SOLUTION INTRAVENOUS; SUBCUTANEOUS at 12:35

## 2017-02-10 RX ADMIN — HYDROMORPHONE HYDROCHLORIDE 0.5 MG: 1 INJECTION, SOLUTION INTRAMUSCULAR; INTRAVENOUS; SUBCUTANEOUS at 02:52

## 2017-02-10 RX ADMIN — COLCHICINE 0.6 MG: 0.6 TABLET, FILM COATED ORAL at 11:28

## 2017-02-10 RX ADMIN — TIZANIDINE 4 MG: 4 TABLET ORAL at 20:30

## 2017-02-10 RX ADMIN — WARFARIN SODIUM 7.5 MG: 7.5 TABLET ORAL at 17:45

## 2017-02-10 RX ADMIN — HEPARIN SODIUM 5000 UNITS: 5000 INJECTION, SOLUTION INTRAVENOUS; SUBCUTANEOUS at 20:30

## 2017-02-10 RX ADMIN — SODIUM CHLORIDE: 9 INJECTION, SOLUTION INTRAVENOUS at 08:00

## 2017-02-10 RX ADMIN — HEPARIN SODIUM 5000 UNITS: 5000 INJECTION, SOLUTION INTRAVENOUS; SUBCUTANEOUS at 06:30

## 2017-02-10 RX ADMIN — PREDNISONE 60 MG: 20 TABLET ORAL at 16:38

## 2017-02-10 RX ADMIN — OXYCODONE HYDROCHLORIDE 20 MG: 20 TABLET, FILM COATED, EXTENDED RELEASE ORAL at 08:20

## 2017-02-10 RX ADMIN — OXYCODONE HYDROCHLORIDE 20 MG: 10 TABLET ORAL at 12:15

## 2017-02-10 RX ADMIN — PREGABALIN 50 MG: 25 CAPSULE ORAL at 20:30

## 2017-02-10 RX ADMIN — Medication 667 MG: at 11:30

## 2017-02-10 RX ADMIN — POTASSIUM CHLORIDE 20 MEQ: 1500 TABLET, EXTENDED RELEASE ORAL at 08:20

## 2017-02-10 RX ADMIN — Medication 667 MG: at 08:20

## 2017-02-10 RX ADMIN — HYDROMORPHONE HYDROCHLORIDE 0.5 MG: 1 INJECTION, SOLUTION INTRAMUSCULAR; INTRAVENOUS; SUBCUTANEOUS at 20:30

## 2017-02-10 RX ADMIN — AMPICILLIN SODIUM AND SULBACTAM SODIUM 3 G: 2; 1 INJECTION, POWDER, FOR SOLUTION INTRAMUSCULAR; INTRAVENOUS at 20:29

## 2017-02-10 RX ADMIN — LIDOCAINE 1 PATCH: 50 PATCH CUTANEOUS at 20:29

## 2017-02-10 RX ADMIN — PREGABALIN 25 MG: 25 CAPSULE ORAL at 08:20

## 2017-02-10 RX ADMIN — HYDROMORPHONE HYDROCHLORIDE 0.5 MG: 1 INJECTION, SOLUTION INTRAMUSCULAR; INTRAVENOUS; SUBCUTANEOUS at 16:19

## 2017-02-10 RX ADMIN — BUPROPION HYDROCHLORIDE 150 MG: 150 TABLET, FILM COATED, EXTENDED RELEASE ORAL at 08:20

## 2017-02-10 RX ADMIN — AMPICILLIN SODIUM AND SULBACTAM SODIUM 3 G: 2; 1 INJECTION, POWDER, FOR SOLUTION INTRAMUSCULAR; INTRAVENOUS at 00:00

## 2017-02-10 RX ADMIN — OXYCODONE HYDROCHLORIDE 20 MG: 20 TABLET, FILM COATED, EXTENDED RELEASE ORAL at 20:30

## 2017-02-10 RX ADMIN — STANDARDIZED SENNA CONCENTRATE AND DOCUSATE SODIUM 1 TABLET: 8.6; 5 TABLET, FILM COATED ORAL at 20:30

## 2017-02-10 RX ADMIN — OXYCODONE HYDROCHLORIDE 20 MG: 10 TABLET ORAL at 16:39

## 2017-02-10 RX ADMIN — PROMETHAZINE HYDROCHLORIDE 25 MG: 25 TABLET ORAL at 22:33

## 2017-02-10 RX ADMIN — IRON SUCROSE 200 MG: 20 INJECTION, SOLUTION INTRAVENOUS at 16:38

## 2017-02-10 ASSESSMENT — ENCOUNTER SYMPTOMS
DIZZINESS: 0
SPUTUM PRODUCTION: 0
EYES NEGATIVE: 1
WEAKNESS: 1
ABDOMINAL PAIN: 1
COUGH: 1
FLANK PAIN: 1
FOCAL WEAKNESS: 1
PSYCHIATRIC NEGATIVE: 1
MUSCULOSKELETAL NEGATIVE: 1
CARDIOVASCULAR NEGATIVE: 1
CHILLS: 0
VOMITING: 0
FEVER: 0

## 2017-02-10 ASSESSMENT — PAIN SCALES - GENERAL
PAINLEVEL_OUTOF10: 8
PAINLEVEL_OUTOF10: 7
PAINLEVEL_OUTOF10: 8

## 2017-02-10 NOTE — CARE PLAN
Problem: Communication  Goal: The ability to communicate needs accurately and effectively will improve  Outcome: PROGRESSING AS EXPECTED  Pt is alert and oriented x 4 . Pt is oriented to the environment and call light. I have discussed with the pt the plan of care, treatments and medications and the pt verbalizes agreement and understanding. The pt has been able to communicate her needs effectively and has been given the opportunity to ask any questions. All pt needs have been met and questions have been answered at this time. Hourly rounding in place.         Problem: Safety  Goal: Will remain free from injury  Outcome: PROGRESSING AS EXPECTED  Pt assessed at beginning of shift. Pt determined to be standby assist . Fall precautions in place. Call light and personal possessions in reach, bed alarm on . Hourly rounding in place.

## 2017-02-10 NOTE — DIETARY
Nutrition Services: Update  Pt is on a renal diet. Attempted to visit pt, pt was not in room. Per chart pt PO % 2 meals recorded. Wt on 2/7 - 76.4 kg, wt slightly up. Meds, labs, and ADLs reviewed.     PLAN/RECOMMEND -   1) Nutrition rep to see patient daily for meal and snack preferences.  2) Encourage PO  3) Weekly weights to monitor fluid and nutrition status  4) Obtain supplement order per RD as needed.  5) Please document PO intake for each meal as percentage of meals consumed    RD available prn

## 2017-02-10 NOTE — PROGRESS NOTES
Inpatient Anticoagulation Service Note    Date: 2/10/2017  Reason for Anticoagulation: Deep Vein Thrombosis, Other (Comments) (Hx SLE)        Hemoglobin Value: 6.5 (Results confirmed by repeat testing.)  Hematocrit Value: 20.9  Lab Platelet Value: 178  Target INR: 2.0 to 3.0    INR from last 7 days     Date/Time INR Value    17 0300 (!)1.46    17 0251 (!)1.52    17 0234 (!)1.2    17 1012 (!)1.23        Dose from last 7 days     Date/Time Dose (mg)    02/10/17 1500 7.5    17 1100 7.5    17 1200 0    17 1200 7.5    17 0800 7.5        Average Dose (mg):  (Home Dose: 7.5 mg Holcomb/Tu and 5 mg ROW per chart review)  Significant Interactions: Antibiotics, Other (Comments) (calcium acetate, buproprion, SQ heparin, hydroxychloroquine)  Bridge Therapy: No (Heparin 5000 units SQ Q 8 hours) (If less than 5 days and overlap therapy discontinued -- document reason (i.e. Bleed Risk))    (If still on overlap therapy, if No -- document reason (i.e. Bleed Risk))    Reversal Agent Administered: Not Applicable  Comments: No INR today.  Reordered for tomorrow. Will continue with 7.5 mg daily.  INR again in AM.  Iron dosing ordered.  Hct down, Hgb down.      Plan:  7.5 tonight.  Education Material Provided?: Yes (no questions, pt declines packet)  Pharmacist suggested discharge dosin.5 mg Holcomb, Tu and 5 mg daily rest of week.     Antonio Romero, PharmD, BCPS

## 2017-02-10 NOTE — CARE PLAN
Problem: Bowel/Gastric:  Goal: Will not experience complications related to bowel motility  Outcome: PROGRESSING AS EXPECTED  Pt does not C/O complications.    Problem: Pain Management  Goal: Pain level will decrease to patient’s comfort goal  Outcome: PROGRESSING AS EXPECTED  Pt reports being more comfortable and able to rest.

## 2017-02-10 NOTE — DISCHARGE PLANNING
Met with pt on dialysis to follow-up on dialysis center/nephrologist choice.  Pt has not looked over list of clinics yet and is not ready to make a decision due to other medical issues.  Will meet with pt again on Monday to see if choice has been made.

## 2017-02-10 NOTE — PROGRESS NOTES
Received report from day RN assumed care at 1915. Pt A&Ox 4 . Pt states 8 /10 pain at this time. Plan of care discussed with Pt, verbalized understanding. No family present at bedside. Assessment completed. All Pt needs met at this time. Call light within reach, bed alarm on , bed locked and in low position. Will continue to monitor.

## 2017-02-10 NOTE — PROGRESS NOTES
Hospital Medicine Progress Note, Adult, Complex               Author: Dannie Triciaernesto Date & Time created: 2/9/2017  5:37 PM     Interval History:  Patient seen and examined today.    Patient tolerating treatment and therapies.  All Data, Medication data reviewed.  Case discussed with nursing as available.  Plan of Care reviewed with patient and notified of changes.  2/7 Pt admitted with generalized pain, R flank pain and malaise, noncompliant with Dialysis and RX, INR low, though states she take coumadin  Asking for more pain RX, CT chest ordered, gas in the portal vein noted, consulted surgery for eval  Concern for intraabdominal infection, process..  kindly evaluated  2/8 Pt tearful again today, still with pain R chest and back, explained Ct findings and suggested R pleural tap, Pt agree's, still genralized chronic pain, for RRT today, d/w RN  2/9 Pt still with sign generalized pain, Pleural tap unable to do safely, Pt frustrated, d/w Dr. Collado and he recommends continued pain control and o/p f/u  Pt wanted to get on a different HD schedule, explained plan of care, Pt feels she needs better pain control and d/c in am  Review of Systems:  Review of Systems   Constitutional: Positive for malaise/fatigue. Negative for fever and chills.   HENT: Negative.    Eyes: Negative.    Respiratory: Positive for cough. Negative for sputum production.    Cardiovascular: Negative.    Gastrointestinal: Positive for abdominal pain. Negative for vomiting.   Genitourinary: Positive for flank pain. Negative for urgency.   Musculoskeletal: Negative.    Skin: Negative.  Negative for rash.   Neurological: Positive for focal weakness and weakness. Negative for dizziness.   Endo/Heme/Allergies: Negative.    Psychiatric/Behavioral: Negative.    All other systems reviewed and are negative.      Physical Exam:  Physical Exam   Constitutional: She is oriented to person, place, and time. She appears well-developed and  well-nourished.   HENT:   Head: Normocephalic and atraumatic.   Nose: Nose normal.   Mouth/Throat: Oropharynx is clear and moist.   Eyes: Conjunctivae and EOM are normal. Pupils are equal, round, and reactive to light.   Neck: Normal range of motion. Neck supple. No JVD present. No thyromegaly present.   Cardiovascular: Normal rate, regular rhythm and normal heart sounds.  Exam reveals no gallop and no friction rub.    Pulmonary/Chest: Effort normal and breath sounds normal. No respiratory distress. She has no wheezes. She has no rales.   L sided port   Abdominal: Soft. She exhibits no distension and no mass. There is tenderness. There is no rebound and no guarding.   Musculoskeletal: Normal range of motion. She exhibits edema. She exhibits no tenderness.   Lymphadenopathy:     She has no cervical adenopathy.   Neurological: She is alert and oriented to person, place, and time. No cranial nerve deficit.   Skin: Skin is warm and dry. She is not diaphoretic.   Psychiatric: She has a normal mood and affect. Her behavior is normal.   Nursing note and vitals reviewed.      Labs:        Invalid input(s): [id number]      Recent Labs      02/07/17   0234  02/08/17   0251  02/09/17   0300   SODIUM  137  134*  136   POTASSIUM  3.3*  3.5*  4.5   CHLORIDE  102  97  99   CO2  23  26  29   BUN  45*  51*  25*   CREATININE  5.63*  6.36*  4.43*   MAGNESIUM   --   2.0  2.1   PHOSPHORUS   --   4.5  3.4   CALCIUM  8.9  9.0  8.8     Recent Labs      02/07/17   0234  02/08/17   0251  02/09/17   0300   ALTSGPT  <5  10  13   ASTSGOT  9*  14  13   ALKPHOSPHAT  60  53  50   TBILIRUBIN  0.4  0.3  0.4   LIPASE  32   --    --    GLUCOSE  76  92  91     Recent Labs      02/07/17   0234  02/08/17   0251  02/09/17   0300   RBC  3.13*  2.83*   --    HEMOGLOBIN  9.7*  8.9*   --    HEMATOCRIT  29.1*  27.3*   --    PLATELETCT  235  200   --    PROTHROMBTM  15.6*  18.8*  18.2*   INR  1.20*  1.52*  1.46*     Recent Labs      02/07/17   0234   17   0251  17   0300   WBC  8.7  6.3   --    NEUTSPOLYS  64.30  66.40   --    LYMPHOCYTES  18.20*  26.50   --    MONOCYTES  14.50*  5.30   --    EOSINOPHILS  1.00  1.80   --    BASOPHILS  0.80  0.00   --    ASTSGOT  9*  14  13   ALTSGPT  <5  10  13   ALKPHOSPHAT  60  53  50   TBILIRUBIN  0.4  0.3  0.4           Hemodynamics:  Temp (24hrs), Av.9 °C (98.5 °F), Min:36.7 °C (98.1 °F), Max:37.2 °C (98.9 °F)  Temperature: 36.9 °C (98.5 °F)  Pulse  Av.1  Min: 81  Max: 122  Blood Pressure: 119/72 mmHg     Respiratory:    Respiration: 16, Pulse Oximetry: 93 %        RUL Breath Sounds: Clear, RML Breath Sounds: Clear, RLL Breath Sounds: Clear, LASHANDA Breath Sounds: Clear, LLL Breath Sounds: Clear  Fluids:  No intake or output data in the 24 hours ending 17 5077     GI/Nutrition:  Orders Placed This Encounter   Procedures   • DIET ORDER     Standing Status: Standing      Number of Occurrences: 1      Standing Expiration Date:      Order Specific Question:  Diet:     Answer:  Renal [8]     Medical Decision Making, by Problem:  Active Hospital Problems    Diagnosis   • Abdominal pain [R10.9], CT without contrast negative, f/u CT chest with portal vein gas  F/u scan benign, surgery evaluated and found not an issue    R pleural fluid with loculation, seems benign, tap not advised  Will check ESR/CRP     • UTI (urinary tract infection) [N39.0] on Abx, f/u CX's, entercoccus  Faec., sens to Amp   • Fibromyalgia [M79.7] chr. 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 Disease  Plan  Change abx to unasyn  F/u labs in am , check inflammatory markers  C/w current RX and abx  See orders  Pt medically complex, should be able to d/c in am  Adjusted her pain RX  Time spent 32 min.  Medications reviewed, Radiology images reviewed and Labs reviewed  Taylor catheter: No Taylor  Central line in place: Need for access    DVT Prophylaxis: Warfarin (Coumadin) and Heparin      Antibiotics: Treating active infection/contamination beyond 24 hours perioperative coverage  Assessed for rehab: Patient returned to prior level of function, rehabilitation not indicated at this time

## 2017-02-10 NOTE — CARE PLAN
Problem: Nutritional:  Goal: Achieve adequate nutritional intake  Patient will consume >50-75% of meals   Outcome: MET Date Met:  02/10/17  Per chart pt PO % 2 meals recorded

## 2017-02-10 NOTE — PROGRESS NOTES
Hospital Medicine Progress Note, Adult, Complex               Author: Spike Gotti Date & Time created: 2/10/2017  1:59 PM     Interval History:  36 year old with ESRD, SLE, who stopped coming to dialysis and subsequently came in with uremia.     Looking for another chair at HD unit.    Review of Systems:  Review of Systems   Constitutional: Positive for malaise/fatigue.   Neurological: Positive for weakness.       Physical Exam:  Physical Exam   Constitutional: She appears well-developed. She appears ill.   Cardiovascular: Normal rate and regular rhythm.    Pulmonary/Chest: Effort normal and breath sounds normal.   Skin: Skin is warm and dry.       Labs:        Invalid input(s): [id number]  Recent Labs      02/10/17   0915   TROPONINI  <0.01     Recent Labs      02/08/17 0251 02/09/17   0300  02/10/17   0400   SODIUM  134*  136  136   POTASSIUM  3.5*  4.5  4.4   CHLORIDE  97  99  99   CO2  26  29  27   BUN  51*  25*  36*   CREATININE  6.36*  4.43*  6.13*   MAGNESIUM  2.0  2.1   --    PHOSPHORUS  4.5  3.4   --    CALCIUM  9.0  8.8  8.5     Recent Labs      02/08/17 0251 02/09/17   0300  02/10/17   0400   ALTSGPT  10  13   --    ASTSGOT  14  13   --    ALKPHOSPHAT  53  50   --    TBILIRUBIN  0.3  0.4   --    GLUCOSE  92  91  123*     Recent Labs      02/08/17   0251  02/09/17   0300  02/10/17   0400  02/10/17   0915   RBC  2.83*   --   2.09*   --    HEMOGLOBIN  8.9*   --   6.5*   --    HEMATOCRIT  27.3*   --   20.9*   --    PLATELETCT  200   --   178   --    PROTHROMBTM  18.8*  18.2*   --    --    INR  1.52*  1.46*   --    --    IRON   --    --    --   21*   FERRITIN   --    --    --   135.2   TOTIRONBC   --    --    --   210*     Recent Labs      02/08/17 0251 02/09/17   0300  02/10/17   0400   WBC  6.3   --   5.9   NEUTSPOLYS  66.40   --    --    LYMPHOCYTES  26.50   --    --    MONOCYTES  5.30   --    --    EOSINOPHILS  1.80   --    --    BASOPHILS  0.00   --    --    MUNA  14  13   --     ALTSGPT  10  13   --    ALKPHOSPHAT  53  50   --    TBILIRUBIN  0.3  0.4   --            Hemodynamics:  Temp (24hrs), Av.1 °C (98.8 °F), Min:36.9 °C (98.4 °F), Max:37.3 °C (99.2 °F)  Temperature: 36.9 °C (98.4 °F)  Pulse  Av.6  Min: 77  Max: 122  Blood Pressure: 113/74 mmHg     Respiratory:    Respiration: 18, Pulse Oximetry: 94 %        RUL Breath Sounds: Clear, RML Breath Sounds: Clear, RLL Breath Sounds: Clear, LASHANDA Breath Sounds: Clear, LLL Breath Sounds: Clear  Fluids:    Intake/Output Summary (Last 24 hours) at 02/10/17 1359  Last data filed at 02/10/17 1135   Gross per 24 hour   Intake    414 ml   Output      0 ml   Net    414 ml        GI/Nutrition:  Orders Placed This Encounter   Procedures   • DIET ORDER     Standing Status: Standing      Number of Occurrences: 1      Standing Expiration Date:      Order Specific Question:  Diet:     Answer:  Renal [8]     Medical Decision Making, by Problem:  Active Hospital Problems    Diagnosis   • Fibromyalgia [M79.7]   • Hypokalemia [E87.6]   • Metabolic acidosis [E87.2]   • Hypertension [I10]   • ESRD on dialysis (CMS-[facility]) [N18.6, Z99.2]   • History of lupus nephritis [Z87.448]     1. ESRD - Noncompliant with HD. Frankly uremic.    2. Anemia - Epogen on dialysis  3. Pericardial effusion - History of SLE, was also uremic due to noncompliance with HD    -HD today  -Await outpatient HD chair   Core Measures

## 2017-02-11 LAB
ALBUMIN SERPL BCP-MCNC: 3.3 G/DL (ref 3.2–4.9)
ALBUMIN/GLOB SERPL: 0.8 G/DL
ALP SERPL-CCNC: 61 U/L (ref 30–99)
ALT SERPL-CCNC: 14 U/L (ref 2–50)
ANION GAP SERPL CALC-SCNC: 8 MMOL/L (ref 0–11.9)
AST SERPL-CCNC: 13 U/L (ref 12–45)
BACTERIA BLD CULT: NORMAL
BACTERIA BLD CULT: NORMAL
BASOPHILS # BLD AUTO: 0.4 % (ref 0–1.8)
BASOPHILS # BLD: 0.02 K/UL (ref 0–0.12)
BILIRUB SERPL-MCNC: 0.4 MG/DL (ref 0.1–1.5)
BUN SERPL-MCNC: 21 MG/DL (ref 8–22)
CALCIUM SERPL-MCNC: 9.4 MG/DL (ref 8.5–10.5)
CHLORIDE SERPL-SCNC: 94 MMOL/L (ref 96–112)
CO2 SERPL-SCNC: 29 MMOL/L (ref 20–33)
CREAT SERPL-MCNC: 4.38 MG/DL (ref 0.5–1.4)
CRP SERPL HS-MCNC: 3.29 MG/DL (ref 0–0.75)
EOSINOPHIL # BLD AUTO: 0 K/UL (ref 0–0.51)
EOSINOPHIL NFR BLD: 0 % (ref 0–6.9)
ERYTHROCYTE [DISTWIDTH] IN BLOOD BY AUTOMATED COUNT: 51.1 FL (ref 35.9–50)
ERYTHROCYTE [SEDIMENTATION RATE] IN BLOOD BY WESTERGREN METHOD: 112 MM/HOUR (ref 0–20)
GFR SERPL CREATININE-BSD FRML MDRD: 12 ML/MIN/1.73 M 2
GLOBULIN SER CALC-MCNC: 4.1 G/DL (ref 1.9–3.5)
GLUCOSE SERPL-MCNC: 132 MG/DL (ref 65–99)
HAPTOGLOB SERPL-MCNC: 238 MG/DL (ref 30–200)
HCT VFR BLD AUTO: 33.1 % (ref 37–47)
HGB BLD-MCNC: 10.4 G/DL (ref 12–16)
IMM GRANULOCYTES # BLD AUTO: 0.08 K/UL (ref 0–0.11)
IMM GRANULOCYTES NFR BLD AUTO: 1.5 % (ref 0–0.9)
INR PPP: 2.04 (ref 0.87–1.13)
LYMPHOCYTES # BLD AUTO: 0.42 K/UL (ref 1–4.8)
LYMPHOCYTES NFR BLD: 7.9 % (ref 22–41)
MAGNESIUM SERPL-MCNC: 2.2 MG/DL (ref 1.5–2.5)
MCH RBC QN AUTO: 29.9 PG (ref 27–33)
MCHC RBC AUTO-ENTMCNC: 31.1 G/DL (ref 33.6–35)
MCV RBC AUTO: 96.2 FL (ref 81.4–97.8)
MONOCYTES # BLD AUTO: 0.1 K/UL (ref 0–0.85)
MONOCYTES NFR BLD AUTO: 1.9 % (ref 0–13.4)
NEUTROPHILS # BLD AUTO: 4.71 K/UL (ref 2–7.15)
NEUTROPHILS NFR BLD: 88.3 % (ref 44–72)
NRBC # BLD AUTO: 0.02 K/UL
NRBC BLD AUTO-RTO: 0.4 /100 WBC
PHOSPHATE SERPL-MCNC: 2.9 MG/DL (ref 2.5–4.5)
PLATELET # BLD AUTO: 186 K/UL (ref 164–446)
PMV BLD AUTO: 10.8 FL (ref 9–12.9)
POTASSIUM SERPL-SCNC: 4.4 MMOL/L (ref 3.6–5.5)
PROT SERPL-MCNC: 7.4 G/DL (ref 6–8.2)
PROTHROMBIN TIME: 23.7 SEC (ref 12–14.6)
RBC # BLD AUTO: 3.44 M/UL (ref 4.2–5.4)
SIGNIFICANT IND 70042: NORMAL
SIGNIFICANT IND 70042: NORMAL
SITE SITE: NORMAL
SITE SITE: NORMAL
SODIUM SERPL-SCNC: 131 MMOL/L (ref 135–145)
SOURCE SOURCE: NORMAL
SOURCE SOURCE: NORMAL
WBC # BLD AUTO: 5.3 K/UL (ref 4.8–10.8)

## 2017-02-11 PROCEDURE — 85025 COMPLETE CBC W/AUTO DIFF WBC: CPT

## 2017-02-11 PROCEDURE — 85652 RBC SED RATE AUTOMATED: CPT

## 2017-02-11 PROCEDURE — 80053 COMPREHEN METABOLIC PANEL: CPT

## 2017-02-11 PROCEDURE — 700102 HCHG RX REV CODE 250 W/ 637 OVERRIDE(OP)

## 2017-02-11 PROCEDURE — A9270 NON-COVERED ITEM OR SERVICE: HCPCS | Performed by: HOSPITALIST

## 2017-02-11 PROCEDURE — A9270 NON-COVERED ITEM OR SERVICE: HCPCS

## 2017-02-11 PROCEDURE — 86140 C-REACTIVE PROTEIN: CPT

## 2017-02-11 PROCEDURE — 700111 HCHG RX REV CODE 636 W/ 250 OVERRIDE (IP): Performed by: NURSE PRACTITIONER

## 2017-02-11 PROCEDURE — 770020 HCHG ROOM/CARE - TELE (206)

## 2017-02-11 PROCEDURE — 700111 HCHG RX REV CODE 636 W/ 250 OVERRIDE (IP): Performed by: HOSPITALIST

## 2017-02-11 PROCEDURE — 700105 HCHG RX REV CODE 258: Performed by: HOSPITALIST

## 2017-02-11 PROCEDURE — 85610 PROTHROMBIN TIME: CPT

## 2017-02-11 PROCEDURE — A9270 NON-COVERED ITEM OR SERVICE: HCPCS | Performed by: INTERNAL MEDICINE

## 2017-02-11 PROCEDURE — 700102 HCHG RX REV CODE 250 W/ 637 OVERRIDE(OP): Performed by: HOSPITALIST

## 2017-02-11 PROCEDURE — 83735 ASSAY OF MAGNESIUM: CPT

## 2017-02-11 PROCEDURE — 84100 ASSAY OF PHOSPHORUS: CPT

## 2017-02-11 PROCEDURE — 99233 SBSQ HOSP IP/OBS HIGH 50: CPT | Performed by: HOSPITALIST

## 2017-02-11 PROCEDURE — 700101 HCHG RX REV CODE 250: Performed by: HOSPITALIST

## 2017-02-11 PROCEDURE — 700102 HCHG RX REV CODE 250 W/ 637 OVERRIDE(OP): Performed by: INTERNAL MEDICINE

## 2017-02-11 RX ORDER — WARFARIN SODIUM 7.5 MG/1
7.5 TABLET ORAL
Status: DISCONTINUED | OUTPATIENT
Start: 2017-02-13 | End: 2017-02-12

## 2017-02-11 RX ORDER — WARFARIN SODIUM 5 MG/1
5 TABLET ORAL
Status: DISCONTINUED | OUTPATIENT
Start: 2017-02-11 | End: 2017-02-12

## 2017-02-11 RX ORDER — OXYCODONE HCL 10 MG/1
30 TABLET, FILM COATED, EXTENDED RELEASE ORAL EVERY 12 HOURS
Status: DISCONTINUED | OUTPATIENT
Start: 2017-02-11 | End: 2017-02-17 | Stop reason: HOSPADM

## 2017-02-11 RX ADMIN — ONDANSETRON 4 MG: 2 INJECTION, SOLUTION INTRAMUSCULAR; INTRAVENOUS at 09:35

## 2017-02-11 RX ADMIN — PREGABALIN 25 MG: 25 CAPSULE ORAL at 08:03

## 2017-02-11 RX ADMIN — OXYCODONE HYDROCHLORIDE 20 MG: 10 TABLET ORAL at 19:50

## 2017-02-11 RX ADMIN — HYDROMORPHONE HYDROCHLORIDE 0.5 MG: 1 INJECTION, SOLUTION INTRAMUSCULAR; INTRAVENOUS; SUBCUTANEOUS at 00:56

## 2017-02-11 RX ADMIN — OXYCODONE HYDROCHLORIDE 20 MG: 10 TABLET ORAL at 11:28

## 2017-02-11 RX ADMIN — OXYCODONE HYDROCHLORIDE 20 MG: 10 TABLET ORAL at 23:57

## 2017-02-11 RX ADMIN — PREGABALIN 50 MG: 25 CAPSULE ORAL at 21:32

## 2017-02-11 RX ADMIN — PREDNISONE 60 MG: 20 TABLET ORAL at 08:01

## 2017-02-11 RX ADMIN — OXYCODONE HYDROCHLORIDE 20 MG: 10 TABLET ORAL at 06:47

## 2017-02-11 RX ADMIN — TRAZODONE HYDROCHLORIDE 50 MG: 50 TABLET ORAL at 21:32

## 2017-02-11 RX ADMIN — POTASSIUM CHLORIDE 20 MEQ: 1500 TABLET, EXTENDED RELEASE ORAL at 08:01

## 2017-02-11 RX ADMIN — WARFARIN SODIUM 5 MG: 5 TABLET ORAL at 18:06

## 2017-02-11 RX ADMIN — Medication 667 MG: at 14:04

## 2017-02-11 RX ADMIN — HYDROMORPHONE HYDROCHLORIDE 0.5 MG: 1 INJECTION, SOLUTION INTRAMUSCULAR; INTRAVENOUS; SUBCUTANEOUS at 22:01

## 2017-02-11 RX ADMIN — OXYCODONE HYDROCHLORIDE 20 MG: 10 TABLET ORAL at 02:36

## 2017-02-11 RX ADMIN — IRON SUCROSE 200 MG: 20 INJECTION, SOLUTION INTRAVENOUS at 09:35

## 2017-02-11 RX ADMIN — HYDROMORPHONE HYDROCHLORIDE 0.5 MG: 1 INJECTION, SOLUTION INTRAMUSCULAR; INTRAVENOUS; SUBCUTANEOUS at 09:35

## 2017-02-11 RX ADMIN — HYDROXYCHLOROQUINE SULFATE 200 MG: 200 TABLET ORAL at 21:32

## 2017-02-11 RX ADMIN — PROMETHAZINE HYDROCHLORIDE 25 MG: 25 TABLET ORAL at 21:31

## 2017-02-11 RX ADMIN — OXYCODONE HYDROCHLORIDE 20 MG: 20 TABLET, FILM COATED, EXTENDED RELEASE ORAL at 08:02

## 2017-02-11 RX ADMIN — OXYCODONE HYDROCHLORIDE 20 MG: 10 TABLET ORAL at 15:52

## 2017-02-11 RX ADMIN — HYDROMORPHONE HYDROCHLORIDE 0.5 MG: 1 INJECTION, SOLUTION INTRAMUSCULAR; INTRAVENOUS; SUBCUTANEOUS at 05:24

## 2017-02-11 RX ADMIN — HYDROMORPHONE HYDROCHLORIDE 0.5 MG: 1 INJECTION, SOLUTION INTRAMUSCULAR; INTRAVENOUS; SUBCUTANEOUS at 18:06

## 2017-02-11 RX ADMIN — BUPROPION HYDROCHLORIDE 150 MG: 150 TABLET, FILM COATED, EXTENDED RELEASE ORAL at 08:02

## 2017-02-11 RX ADMIN — TIZANIDINE 4 MG: 4 TABLET ORAL at 21:32

## 2017-02-11 RX ADMIN — HEPARIN SODIUM 5000 UNITS: 5000 INJECTION, SOLUTION INTRAVENOUS; SUBCUTANEOUS at 05:24

## 2017-02-11 RX ADMIN — OXYCODONE HYDROCHLORIDE 30 MG: 10 TABLET, FILM COATED, EXTENDED RELEASE ORAL at 21:32

## 2017-02-11 RX ADMIN — LIDOCAINE 1 PATCH: 50 PATCH CUTANEOUS at 21:35

## 2017-02-11 RX ADMIN — STANDARDIZED SENNA CONCENTRATE AND DOCUSATE SODIUM 1 TABLET: 8.6; 5 TABLET, FILM COATED ORAL at 21:32

## 2017-02-11 RX ADMIN — Medication 667 MG: at 18:06

## 2017-02-11 RX ADMIN — AMPICILLIN SODIUM AND SULBACTAM SODIUM 3 G: 2; 1 INJECTION, POWDER, FOR SOLUTION INTRAMUSCULAR; INTRAVENOUS at 08:01

## 2017-02-11 RX ADMIN — HYDROMORPHONE HYDROCHLORIDE 0.5 MG: 1 INJECTION, SOLUTION INTRAMUSCULAR; INTRAVENOUS; SUBCUTANEOUS at 14:04

## 2017-02-11 RX ADMIN — Medication 667 MG: at 08:01

## 2017-02-11 RX ADMIN — AMPICILLIN SODIUM AND SULBACTAM SODIUM 3 G: 2; 1 INJECTION, POWDER, FOR SOLUTION INTRAMUSCULAR; INTRAVENOUS at 21:32

## 2017-02-11 ASSESSMENT — ENCOUNTER SYMPTOMS
EYES NEGATIVE: 1
FLANK PAIN: 1
CHILLS: 0
FOCAL WEAKNESS: 1
PSYCHIATRIC NEGATIVE: 1
DIZZINESS: 0
WEAKNESS: 1
ABDOMINAL PAIN: 1
COUGH: 1
VOMITING: 0
CARDIOVASCULAR NEGATIVE: 1
MUSCULOSKELETAL NEGATIVE: 1
SPUTUM PRODUCTION: 0
FEVER: 0

## 2017-02-11 ASSESSMENT — PAIN SCALES - GENERAL
PAINLEVEL_OUTOF10: 8
PAINLEVEL_OUTOF10: 7
PAINLEVEL_OUTOF10: 8
PAINLEVEL_OUTOF10: 7

## 2017-02-11 NOTE — PROGRESS NOTES
Inpatient Anticoagulation Service Note    Date: 2017  Reason for Anticoagulation: Deep Vein Thrombosis, Other (Comments) (Hx SLE)        Hemoglobin Value: 10.4  Hematocrit Value: 33.1  Lab Platelet Value: 186  Target INR: 2.0 to 3.0    INR from last 7 days     Date/Time INR Value    17 0245 (!)2.04    17 0300 (!)1.46    17 0251 (!)1.52    17 0234 (!)1.2    17 1012 (!)1.23        Dose from last 7 days     Date/Time Dose (mg)    17 1200 5    02/10/17 1500 7.5    17 1100 7.5    17 1200 0    17 1200 7.5    17 0800 7.5        Average Dose (mg):  (Home Dose: 7.5 mg Holcomb/Tu and 5 mg ROW per chart review)  Significant Interactions: Antibiotics, Other (Comments) (calcium acetate, buproprion, SQ heparin, hydroxychloroquine)  Bridge Therapy: No (Heparin 5000 units SQ Q 8 hours)     Reversal Agent Administered: Not Applicable  Comments: Therapeutic.  Will resume home dose of 7.5 mg twice a week and 5 mg daily the rest of the week.  INR again in AM to verify trend.      Plan:  5 mg tonight.  Heparin SQ stopped with INR > 2  Education Material Provided?: Yes (no questions, pt declines packet)  Pharmacist suggested discharge dosin.5 mg Mon, Thur and 5 mg daily rest of week.  INR within 48 hours of discharge.     Antonio Romero, PharmD, BCPS

## 2017-02-11 NOTE — PROGRESS NOTES
Hospital Medicine Progress Note, Adult, Complex               Author: Dannie Neel Date & Time created: 2/11/2017  3:47 PM     Interval History:  Patient seen and examined today.    Patient tolerating treatment and therapies.  All Data, Medication data reviewed.  Case discussed with nursing as available.  Plan of Care reviewed with patient and notified of changes.  2/7 Pt admitted with generalized pain, R flank pain and malaise, noncompliant with Dialysis and RX, INR low, though states she take coumadin  Asking for more pain RX, CT chest ordered, gas in the portal vein noted, consulted surgery for eval  Concern for intraabdominal infection, process..  kindly evaluated  2/8 Pt tearful again today, still with pain R chest and back, explained Ct findings and suggested R pleural tap, Pt agree's, still genralized chronic pain, for RRT today, d/w RN  2/9 Pt still with sign generalized pain, Pleural tap unable to do safely, Pt frustrated, d/w Dr. Collado and he recommends continued pain control and o/p f/u  Pt wanted to get on a different HD schedule, explained plan of care, Pt feels she needs better pain control and d/c in am  2/10 Pt with ongoing pain and explained need to reduce inflammation, consider pericarditis Dx  2/11 Pt feels some better, better pain control, difficult social situation and lack of financial support, D/w dialysis liason  Review of Systems:  Review of Systems   Constitutional: Positive for malaise/fatigue. Negative for fever and chills.   HENT: Negative.    Eyes: Negative.    Respiratory: Positive for cough. Negative for sputum production.    Cardiovascular: Negative.    Gastrointestinal: Positive for abdominal pain. Negative for vomiting.   Genitourinary: Positive for flank pain. Negative for urgency.   Musculoskeletal: Negative.    Skin: Negative.  Negative for rash.   Neurological: Positive for focal weakness and weakness. Negative for dizziness.   Endo/Heme/Allergies: Negative.     Psychiatric/Behavioral: Negative.    All other systems reviewed and are negative.      Physical Exam:  Physical Exam   Constitutional: She is oriented to person, place, and time. She appears well-developed and well-nourished.   HENT:   Head: Normocephalic and atraumatic.   Nose: Nose normal.   Mouth/Throat: Oropharynx is clear and moist.   Eyes: Conjunctivae and EOM are normal. Pupils are equal, round, and reactive to light.   Neck: Normal range of motion. Neck supple. No JVD present. No thyromegaly present.   Cardiovascular: Normal rate, regular rhythm and normal heart sounds.  Exam reveals no gallop and no friction rub.    Pulmonary/Chest: Effort normal and breath sounds normal. No respiratory distress. She has no wheezes. She has no rales.   L sided port   Abdominal: Soft. She exhibits no distension and no mass. There is tenderness. There is no rebound and no guarding.   Musculoskeletal: Normal range of motion. She exhibits edema. She exhibits no tenderness.   Lymphadenopathy:     She has no cervical adenopathy.   Neurological: She is alert and oriented to person, place, and time. No cranial nerve deficit.   Skin: Skin is warm and dry. She is not diaphoretic.   Psychiatric: She has a normal mood and affect. Her behavior is normal.   Nursing note and vitals reviewed.      Labs:        Invalid input(s): [id number]  Recent Labs      02/10/17   0915   TROPONINI  <0.01     Recent Labs      02/09/17   0300  02/10/17   0400  02/11/17   0245   SODIUM  136  136  131*   POTASSIUM  4.5  4.4  4.4   CHLORIDE  99  99  94*   CO2  29  27  29   BUN  25*  36*  21   CREATININE  4.43*  6.13*  4.38*   MAGNESIUM  2.1   --   2.2   PHOSPHORUS  3.4   --   2.9   CALCIUM  8.8  8.5  9.4     Recent Labs      02/09/17   0300  02/10/17   0400  02/11/17   0245   ALTSGPT  13   --   14   ASTSGOT  13   --   13   ALKPHOSPHAT  50   --   61   TBILIRUBIN  0.4   --   0.4   GLUCOSE  91  123*  132*     Recent Labs      02/09/17   0300  02/10/17    0400  02/10/17   0915  17   0245   RBC   --   2.09*   --   3.44*   HEMOGLOBIN   --   6.5*   --   10.4*   HEMATOCRIT   --   20.9*   --   33.1*   PLATELETCT   --   178   --   186   PROTHROMBTM  18.2*   --    --   23.7*   INR  1.46*   --    --   2.04*   IRON   --    --   21*   --    FERRITIN   --    --   135.2   --    TOTIRONBC   --    --   210*   --      Recent Labs      17   0300  02/10/17   0400  02/11/17   0245   WBC   --   5.9  5.3   NEUTSPOLYS   --    --   88.30*   LYMPHOCYTES   --    --   7.90*   MONOCYTES   --    --   1.90   EOSINOPHILS   --    --   0.00   BASOPHILS   --    --   0.40   ASTSGOT  13   --   13   ALTSGPT  13   --   14   ALKPHOSPHAT  50   --   61   TBILIRUBIN  0.4   --   0.4           Hemodynamics:  Temp (24hrs), Av.7 °C (98 °F), Min:36.4 °C (97.6 °F), Max:36.9 °C (98.5 °F)  Temperature: 36.4 °C (97.6 °F)  Pulse  Av.9  Min: 65  Max: 122  Blood Pressure: 119/78 mmHg     Respiratory:    Respiration: 16, Pulse Oximetry: 91 %        RUL Breath Sounds: Clear, RML Breath Sounds: Clear, RLL Breath Sounds: Clear, LASHANDA Breath Sounds: Clear, LLL Breath Sounds: Clear  Fluids:  No intake or output data in the 24 hours ending 17 1547  Weight: 79.5 kg (175 lb 4.3 oz)  GI/Nutrition:  Orders Placed This Encounter   Procedures   • DIET ORDER     Standing Status: Standing      Number of Occurrences: 1      Standing Expiration Date:      Order Specific Question:  Diet:     Answer:  Renal [8]     Medical Decision Making, by Problem:  Active Hospital Problems    Diagnosis   • Chest pain with pericardial effusion  ? Of pericarditis, high inflammatory markers  D/w Rheum  EKG not abnormal, Trop negative  Would get Echo and start steroids      Abdominal pain [R10.9], CT without contrast negative, f/u CT chest with portal vein gas  F/u scan benign, surgery evaluated and found not an issue    R pleural fluid with loculation, seems benign, tap not advised  Will check ESR/CRP     • UTI (urinary tract  infection) [N39.0] on Abx, f/u CX's, entercoccus  Faec., sens to Amp   • Fibromyalgia [M79.7] chr. Pain PT   • AV fistula occlusion (CMS-[facility]) [T82.898A] with coumadin use, INR subtherapheutic   • Hemorrhagic disorder due to extrinsic circulating anticoagulants (CMS-[facility]) [D68.32]  Restart coumadin   • Hypokalemia [E87.6] will replace and recheck   • Metabolic acidosis [E87.2] improved with RRT   • Hypertension [I10] follow, RX   • ESRD on dialysis (CMS-[facility]) [N18.6, Z99.2] seen by nephro   • History of lupus nephritis [Z87.448] leading to RRT   • Medical non-compliance [Z91.19] , repeat episode   - anemia of chr. Disease, as well as severe iron deficiency  Needed transfusion, will replace Iron IV  Plan  Change abx to unasyn, d/w Pharm  C/w steroidsSteroids  F/u on labs, EKG and Echo  C/w current RX and abx  See orders  Adjusted her pain RX further  Need o/p HD set up prior to leaving,   Pt wants to change nephrology group  Time spent 32 min.  Medications reviewed, Radiology images reviewed and Labs reviewed  Taylor catheter: No Taylor  Central line in place: Need for access    DVT Prophylaxis: Warfarin (Coumadin) and Heparin      Antibiotics: Treating active infection/contamination beyond 24 hours perioperative coverage  Assessed for rehab: Patient returned to prior level of function, rehabilitation not indicated at this time

## 2017-02-11 NOTE — PROGRESS NOTES
Hospital Medicine Progress Note, Adult, Complex               Author: Dannie Neel Date & Time created: 2/10/2017  4:25 PM     Interval History:  Patient seen and examined today.    Patient tolerating treatment and therapies.  All Data, Medication data reviewed.  Case discussed with nursing as available.  Plan of Care reviewed with patient and notified of changes.  2/7 Pt admitted with generalized pain, R flank pain and malaise, noncompliant with Dialysis and RX, INR low, though states she take coumadin  Asking for more pain RX, CT chest ordered, gas in the portal vein noted, consulted surgery for eval  Concern for intraabdominal infection, process..  kindly evaluated  2/8 Pt tearful again today, still with pain R chest and back, explained Ct findings and suggested R pleural tap, Pt agree's, still genralized chronic pain, for RRT today, d/w RN  2/9 Pt still with sign generalized pain, Pleural tap unable to do safely, Pt frustrated, d/w Dr. Collado and he recommends continued pain control and o/p f/u  Pt wanted to get on a different HD schedule, explained plan of care, Pt feels she needs better pain control and d/c in am  2/10 Pt with ongoing pain and explained need to reduce inflammation, consider pericarditis Dx  Review of Systems:  Review of Systems   Constitutional: Positive for malaise/fatigue. Negative for fever and chills.   HENT: Negative.    Eyes: Negative.    Respiratory: Positive for cough. Negative for sputum production.    Cardiovascular: Negative.    Gastrointestinal: Positive for abdominal pain. Negative for vomiting.   Genitourinary: Positive for flank pain. Negative for urgency.   Musculoskeletal: Negative.    Skin: Negative.  Negative for rash.   Neurological: Positive for focal weakness and weakness. Negative for dizziness.   Endo/Heme/Allergies: Negative.    Psychiatric/Behavioral: Negative.    All other systems reviewed and are negative.      Physical Exam:  Physical Exam    Constitutional: She is oriented to person, place, and time. She appears well-developed and well-nourished.   HENT:   Head: Normocephalic and atraumatic.   Nose: Nose normal.   Mouth/Throat: Oropharynx is clear and moist.   Eyes: Conjunctivae and EOM are normal. Pupils are equal, round, and reactive to light.   Neck: Normal range of motion. Neck supple. No JVD present. No thyromegaly present.   Cardiovascular: Normal rate, regular rhythm and normal heart sounds.  Exam reveals no gallop and no friction rub.    Pulmonary/Chest: Effort normal and breath sounds normal. No respiratory distress. She has no wheezes. She has no rales.   L sided port   Abdominal: Soft. She exhibits no distension and no mass. There is tenderness. There is no rebound and no guarding.   Musculoskeletal: Normal range of motion. She exhibits edema. She exhibits no tenderness.   Lymphadenopathy:     She has no cervical adenopathy.   Neurological: She is alert and oriented to person, place, and time. No cranial nerve deficit.   Skin: Skin is warm and dry. She is not diaphoretic.   Psychiatric: She has a normal mood and affect. Her behavior is normal.   Nursing note and vitals reviewed.      Labs:        Invalid input(s): [id number]  Recent Labs      02/10/17   0915   TROPONINI  <0.01     Recent Labs      02/08/17 0251 02/09/17   0300  02/10/17   0400   SODIUM  134*  136  136   POTASSIUM  3.5*  4.5  4.4   CHLORIDE  97  99  99   CO2  26  29  27   BUN  51*  25*  36*   CREATININE  6.36*  4.43*  6.13*   MAGNESIUM  2.0  2.1   --    PHOSPHORUS  4.5  3.4   --    CALCIUM  9.0  8.8  8.5     Recent Labs      02/08/17   0251 02/09/17   0300  02/10/17   0400   ALTSGPT  10  13   --    ASTSGOT  14  13   --    ALKPHOSPHAT  53  50   --    TBILIRUBIN  0.3  0.4   --    GLUCOSE  92  91  123*     Recent Labs      02/08/17   0251 02/09/17   0300  02/10/17   0400  02/10/17   0915   RBC  2.83*   --   2.09*   --    HEMOGLOBIN  8.9*   --   6.5*   --    HEMATOCRIT   27.3*   --   20.9*   --    PLATELETCT  200   --   178   --    PROTHROMBTM  18.8*  18.2*   --    --    INR  1.52*  1.46*   --    --    IRON   --    --    --   21*   FERRITIN   --    --    --   135.2   TOTIRONBC   --    --    --   210*     Recent Labs      17   0251  17   0300  02/10/17   0400   WBC  6.3   --   5.9   NEUTSPOLYS  66.40   --    --    LYMPHOCYTES  26.50   --    --    MONOCYTES  5.30   --    --    EOSINOPHILS  1.80   --    --    BASOPHILS  0.00   --    --    ASTSGOT  14  13   --    ALTSGPT  10  13   --    ALKPHOSPHAT  53  50   --    TBILIRUBIN  0.3  0.4   --            Hemodynamics:  Temp (24hrs), Av.1 °C (98.8 °F), Min:36.9 °C (98.4 °F), Max:37.3 °C (99.2 °F)  Temperature: 36.9 °C (98.4 °F)  Pulse  Av.6  Min: 77  Max: 122  Blood Pressure: 113/74 mmHg     Respiratory:    Respiration: 18, Pulse Oximetry: 94 %        RUL Breath Sounds: Clear, RML Breath Sounds: Clear, RLL Breath Sounds: Clear, LASHANDA Breath Sounds: Clear, LLL Breath Sounds: Clear  Fluids:    Intake/Output Summary (Last 24 hours) at 02/10/17 1625  Last data filed at 02/10/17 1135   Gross per 24 hour   Intake    414 ml   Output      0 ml   Net    414 ml        GI/Nutrition:  Orders Placed This Encounter   Procedures   • DIET ORDER     Standing Status: Standing      Number of Occurrences: 1      Standing Expiration Date:      Order Specific Question:  Diet:     Answer:  Renal [8]     Medical Decision Making, by Problem:  Active Hospital Problems    Diagnosis   • Chest pain with pericardial effusion  ? Of pericarditis, high inflammatory markers  D/w Rheum  EKG not abnormal, Trop negative  Would get Echo and start steroids      Abdominal pain [R10.9], CT without contrast negative, f/u CT chest with portal vein gas  F/u scan benign, surgery evaluated and found not an issue    R pleural fluid with loculation, seems benign, tap not advised  Will check ESR/CRP     • UTI (urinary tract infection) [N39.0] on Abx, f/u CX's,  entercoccus  Faec., sens to Amp   • Fibromyalgia [M79.7] chr. 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 Disease, as well as severe iron deficiency  Needed transfusion, will replace Iron IV  Plan  Change abx to unasyn, d/w Pharm  Start Steroids  F/u on labs, EKG and Echo  C/w current RX and abx  See orders  Adjusted her pain RX  Time spent 32 min.  Medications reviewed, Radiology images reviewed and Labs reviewed  Taylor catheter: No Taylor  Central line in place: Need for access    DVT Prophylaxis: Warfarin (Coumadin) and Heparin      Antibiotics: Treating active infection/contamination beyond 24 hours perioperative coverage  Assessed for rehab: Patient returned to prior level of function, rehabilitation not indicated at this time

## 2017-02-11 NOTE — PROGRESS NOTES
Hospital Medicine Progress Note, Adult, Complex               Author: Spike Gotti Date & Time created: 2/11/2017  11:34 AM     Interval History:  36 year old with ESRD, SLE, who stopped coming to dialysis and subsequently came in with uremia.     Noted started steroids for high inflammatory markers. HD on MWF schedule. Notes small joint pains.    Review of Systems:  Review of Systems   Constitutional: Positive for malaise/fatigue.   Neurological: Positive for weakness.       Physical Exam:  Physical Exam   Constitutional: She appears well-developed. She appears ill.   Cardiovascular: Normal rate and regular rhythm.    Pulmonary/Chest: Effort normal and breath sounds normal.   Skin: Skin is warm and dry.       Labs:        Invalid input(s): [id number]  Recent Labs      02/10/17   0915   TROPONINI  <0.01     Recent Labs      02/09/17   0300  02/10/17   0400  02/11/17   0245   SODIUM  136  136  131*   POTASSIUM  4.5  4.4  4.4   CHLORIDE  99  99  94*   CO2  29  27  29   BUN  25*  36*  21   CREATININE  4.43*  6.13*  4.38*   MAGNESIUM  2.1   --   2.2   PHOSPHORUS  3.4   --   2.9   CALCIUM  8.8  8.5  9.4     Recent Labs      02/09/17   0300  02/10/17   0400  02/11/17   0245   ALTSGPT  13   --   14   ASTSGOT  13   --   13   ALKPHOSPHAT  50   --   61   TBILIRUBIN  0.4   --   0.4   GLUCOSE  91  123*  132*     Recent Labs      02/09/17   0300  02/10/17   0400  02/10/17   0915  02/11/17   0245   RBC   --   2.09*   --   3.44*   HEMOGLOBIN   --   6.5*   --   10.4*   HEMATOCRIT   --   20.9*   --   33.1*   PLATELETCT   --   178   --   186   PROTHROMBTM  18.2*   --    --   23.7*   INR  1.46*   --    --   2.04*   IRON   --    --   21*   --    FERRITIN   --    --   135.2   --    TOTIRONBC   --    --   210*   --      Recent Labs      02/09/17   0300  02/10/17   0400  02/11/17   0245   WBC   --   5.9  5.3   NEUTSPOLYS   --    --   88.30*   LYMPHOCYTES   --    --   7.90*   MONOCYTES   --    --   1.90   EOSINOPHILS   --    --    0.00   BASOPHILS   --    --   0.40   ASTSGOT  13   --   13   ALTSGPT  13   --   14   ALKPHOSPHAT  50   --   61   TBILIRUBIN  0.4   --   0.4           Hemodynamics:  Temp (24hrs), Av.7 °C (98.1 °F), Min:36.5 °C (97.7 °F), Max:36.9 °C (98.5 °F)  Temperature: 36.5 °C (97.7 °F)  Pulse  Av.1  Min: 65  Max: 122  Blood Pressure: 121/80 mmHg     Respiratory:    Respiration: 18, Pulse Oximetry: 91 %        RUL Breath Sounds: Clear, RML Breath Sounds: Clear, RLL Breath Sounds: Clear, LASHANDA Breath Sounds: Clear, LLL Breath Sounds: Clear  Fluids:    Intake/Output Summary (Last 24 hours) at 17 1134  Last data filed at 02/10/17 1535   Gross per 24 hour   Intake    914 ml   Output   3000 ml   Net  -2086 ml     Weight: 79.5 kg (175 lb 4.3 oz)  GI/Nutrition:  Orders Placed This Encounter   Procedures   • DIET ORDER     Standing Status: Standing      Number of Occurrences: 1      Standing Expiration Date:      Order Specific Question:  Diet:     Answer:  Renal [8]     Medical Decision Making, by Problem:  Active Hospital Problems    Diagnosis   • Fibromyalgia [M79.7]   • Hypokalemia [E87.6]   • Metabolic acidosis [E87.2]   • Hypertension [I10]   • ESRD on dialysis (CMS-[facility]) [N18.6, Z99.2]   • History of lupus nephritis [Z87.448]     1. ESRD - Noncompliant with HD. Now on MWF schedule   2. Anemia - Epogen on dialysis, Hgb 10.4, on venofer  3. Pericardial effusion - History of SLE, was also uremic due to noncompliance with HD    -HD next on Monday  Core Measures

## 2017-02-11 NOTE — PROGRESS NOTES
Hemodialysis ordered by Dr. Gotti. Treatment started at 1235 and ended at 1535. Pt stable, vss, no c/o post tx. See flow sheets for details. Net UF 2.5 liters. Report to AZAR Calle RN. RT UA AVG dressing clean, dry, intact.

## 2017-02-11 NOTE — PROGRESS NOTES
Report received from JOSETTE Lopez. Assumed care of patient. Updated patient on plan of care. Fall precautions in place, call light within reach.

## 2017-02-11 NOTE — CARE PLAN
Problem: Knowledge Deficit  Goal: Knowledge of disease process/condition, treatment plan, diagnostic tests, and medications will improve  Outcome: PROGRESSING AS EXPECTED  Patient educated regarding activity, diet, meds and plan of care. Patient verbalized understanding.     Problem: Pain Management  Goal: Pain level will decrease to patient’s comfort goal  Outcome: PROGRESSING AS EXPECTED  Patient assessed for pain q4h and medicated PRN. Patient instructed to notify RN of any new or increasing pain to prevent it from becoming intolerable. Patient verbalized understanding.

## 2017-02-12 LAB
ANION GAP SERPL CALC-SCNC: 13 MMOL/L (ref 0–11.9)
BUN SERPL-MCNC: 46 MG/DL (ref 8–22)
C3 SERPL-MCNC: 118 MG/DL (ref 87–200)
C4 SERPL-MCNC: 33 MG/DL (ref 19–52)
CALCIUM SERPL-MCNC: 9.1 MG/DL (ref 8.5–10.5)
CHLORIDE SERPL-SCNC: 99 MMOL/L (ref 96–112)
CO2 SERPL-SCNC: 24 MMOL/L (ref 20–33)
CREAT SERPL-MCNC: 6.04 MG/DL (ref 0.5–1.4)
GFR SERPL CREATININE-BSD FRML MDRD: 8 ML/MIN/1.73 M 2
GLUCOSE SERPL-MCNC: 116 MG/DL (ref 65–99)
INR PPP: 3.27 (ref 0.87–1.13)
LV EJECT FRACT  99904: 60
LV EJECT FRACT MOD 2C 99903: 60.84
LV EJECT FRACT MOD 4C 99902: 43.6
LV EJECT FRACT MOD BP 99901: 48.23
MAGNESIUM SERPL-MCNC: 2.4 MG/DL (ref 1.5–2.5)
PHOSPHATE SERPL-MCNC: 4.2 MG/DL (ref 2.5–4.5)
POTASSIUM SERPL-SCNC: 4.6 MMOL/L (ref 3.6–5.5)
PROTHROMBIN TIME: 34.3 SEC (ref 12–14.6)
SODIUM SERPL-SCNC: 136 MMOL/L (ref 135–145)

## 2017-02-12 PROCEDURE — 93306 TTE W/DOPPLER COMPLETE: CPT

## 2017-02-12 PROCEDURE — 80048 BASIC METABOLIC PNL TOTAL CA: CPT

## 2017-02-12 PROCEDURE — 700105 HCHG RX REV CODE 258: Performed by: HOSPITALIST

## 2017-02-12 PROCEDURE — 86225 DNA ANTIBODY NATIVE: CPT

## 2017-02-12 PROCEDURE — 700111 HCHG RX REV CODE 636 W/ 250 OVERRIDE (IP): Performed by: HOSPITALIST

## 2017-02-12 PROCEDURE — A9270 NON-COVERED ITEM OR SERVICE: HCPCS | Performed by: HOSPITALIST

## 2017-02-12 PROCEDURE — 86162 COMPLEMENT TOTAL (CH50): CPT

## 2017-02-12 PROCEDURE — 93306 TTE W/DOPPLER COMPLETE: CPT | Mod: 26 | Performed by: INTERNAL MEDICINE

## 2017-02-12 PROCEDURE — 99232 SBSQ HOSP IP/OBS MODERATE 35: CPT | Performed by: HOSPITALIST

## 2017-02-12 PROCEDURE — 85610 PROTHROMBIN TIME: CPT

## 2017-02-12 PROCEDURE — 700101 HCHG RX REV CODE 250: Performed by: HOSPITALIST

## 2017-02-12 PROCEDURE — 306581 SET INFUSION,POWERLOC 20G X 1: Performed by: FAMILY MEDICINE

## 2017-02-12 PROCEDURE — 700111 HCHG RX REV CODE 636 W/ 250 OVERRIDE (IP): Performed by: NURSE PRACTITIONER

## 2017-02-12 PROCEDURE — 700102 HCHG RX REV CODE 250 W/ 637 OVERRIDE(OP): Performed by: INTERNAL MEDICINE

## 2017-02-12 PROCEDURE — A9270 NON-COVERED ITEM OR SERVICE: HCPCS | Performed by: INTERNAL MEDICINE

## 2017-02-12 PROCEDURE — 700102 HCHG RX REV CODE 250 W/ 637 OVERRIDE(OP): Performed by: HOSPITALIST

## 2017-02-12 PROCEDURE — 770020 HCHG ROOM/CARE - TELE (206)

## 2017-02-12 PROCEDURE — 306580 SET INFUSION,POWERLOC 20G X.75: Performed by: FAMILY MEDICINE

## 2017-02-12 PROCEDURE — 83735 ASSAY OF MAGNESIUM: CPT

## 2017-02-12 PROCEDURE — 86160 COMPLEMENT ANTIGEN: CPT | Mod: 91

## 2017-02-12 PROCEDURE — 84100 ASSAY OF PHOSPHORUS: CPT

## 2017-02-12 PROCEDURE — 700105 HCHG RX REV CODE 258

## 2017-02-12 RX ORDER — SODIUM CHLORIDE 9 MG/ML
INJECTION, SOLUTION INTRAVENOUS
Status: COMPLETED
Start: 2017-02-12 | End: 2017-02-12

## 2017-02-12 RX ADMIN — SODIUM CHLORIDE: 9 INJECTION, SOLUTION INTRAVENOUS at 15:00

## 2017-02-12 RX ADMIN — PROMETHAZINE HYDROCHLORIDE 25 MG: 25 TABLET ORAL at 15:54

## 2017-02-12 RX ADMIN — PREDNISONE 60 MG: 20 TABLET ORAL at 08:47

## 2017-02-12 RX ADMIN — OXYCODONE HYDROCHLORIDE 30 MG: 10 TABLET, FILM COATED, EXTENDED RELEASE ORAL at 08:48

## 2017-02-12 RX ADMIN — HYDROMORPHONE HYDROCHLORIDE 0.5 MG: 1 INJECTION, SOLUTION INTRAMUSCULAR; INTRAVENOUS; SUBCUTANEOUS at 19:25

## 2017-02-12 RX ADMIN — STANDARDIZED SENNA CONCENTRATE AND DOCUSATE SODIUM 1 TABLET: 8.6; 5 TABLET, FILM COATED ORAL at 21:24

## 2017-02-12 RX ADMIN — PROMETHAZINE HYDROCHLORIDE 25 MG: 25 TABLET ORAL at 21:22

## 2017-02-12 RX ADMIN — HYDROMORPHONE HYDROCHLORIDE 0.5 MG: 1 INJECTION, SOLUTION INTRAMUSCULAR; INTRAVENOUS; SUBCUTANEOUS at 06:04

## 2017-02-12 RX ADMIN — AMPICILLIN SODIUM AND SULBACTAM SODIUM 3 G: 2; 1 INJECTION, POWDER, FOR SOLUTION INTRAMUSCULAR; INTRAVENOUS at 08:48

## 2017-02-12 RX ADMIN — LIDOCAINE 1 PATCH: 50 PATCH CUTANEOUS at 21:22

## 2017-02-12 RX ADMIN — BUPROPION HYDROCHLORIDE 150 MG: 150 TABLET, FILM COATED, EXTENDED RELEASE ORAL at 08:47

## 2017-02-12 RX ADMIN — HYDROMORPHONE HYDROCHLORIDE 0.5 MG: 1 INJECTION, SOLUTION INTRAMUSCULAR; INTRAVENOUS; SUBCUTANEOUS at 15:06

## 2017-02-12 RX ADMIN — PREGABALIN 25 MG: 25 CAPSULE ORAL at 08:47

## 2017-02-12 RX ADMIN — OXYCODONE HYDROCHLORIDE 20 MG: 10 TABLET ORAL at 04:22

## 2017-02-12 RX ADMIN — HYDROXYCHLOROQUINE SULFATE 200 MG: 200 TABLET ORAL at 21:21

## 2017-02-12 RX ADMIN — HYDROMORPHONE HYDROCHLORIDE 0.5 MG: 1 INJECTION, SOLUTION INTRAMUSCULAR; INTRAVENOUS; SUBCUTANEOUS at 11:00

## 2017-02-12 RX ADMIN — AMPICILLIN SODIUM AND SULBACTAM SODIUM 3 G: 2; 1 INJECTION, POWDER, FOR SOLUTION INTRAMUSCULAR; INTRAVENOUS at 21:21

## 2017-02-12 RX ADMIN — Medication 667 MG: at 17:13

## 2017-02-12 RX ADMIN — OXYCODONE HYDROCHLORIDE 20 MG: 10 TABLET ORAL at 17:14

## 2017-02-12 RX ADMIN — OXYCODONE HYDROCHLORIDE 20 MG: 10 TABLET ORAL at 13:11

## 2017-02-12 RX ADMIN — IRON SUCROSE 200 MG: 20 INJECTION, SOLUTION INTRAVENOUS at 12:11

## 2017-02-12 RX ADMIN — PREGABALIN 50 MG: 25 CAPSULE ORAL at 21:22

## 2017-02-12 RX ADMIN — OXYCODONE HYDROCHLORIDE 20 MG: 10 TABLET ORAL at 21:48

## 2017-02-12 RX ADMIN — OXYCODONE HYDROCHLORIDE 20 MG: 10 TABLET ORAL at 08:48

## 2017-02-12 RX ADMIN — TRAZODONE HYDROCHLORIDE 50 MG: 50 TABLET ORAL at 21:24

## 2017-02-12 RX ADMIN — PROMETHAZINE HYDROCHLORIDE 25 MG: 25 TABLET ORAL at 06:06

## 2017-02-12 RX ADMIN — TIZANIDINE 4 MG: 4 TABLET ORAL at 21:21

## 2017-02-12 RX ADMIN — HYDROMORPHONE HYDROCHLORIDE 0.5 MG: 1 INJECTION, SOLUTION INTRAMUSCULAR; INTRAVENOUS; SUBCUTANEOUS at 02:00

## 2017-02-12 RX ADMIN — OXYCODONE HYDROCHLORIDE 30 MG: 10 TABLET, FILM COATED, EXTENDED RELEASE ORAL at 21:23

## 2017-02-12 RX ADMIN — Medication 667 MG: at 08:47

## 2017-02-12 RX ADMIN — Medication 667 MG: at 12:11

## 2017-02-12 RX ADMIN — POTASSIUM CHLORIDE 20 MEQ: 1500 TABLET, EXTENDED RELEASE ORAL at 09:00

## 2017-02-12 RX ADMIN — HYDROMORPHONE HYDROCHLORIDE 0.5 MG: 1 INJECTION, SOLUTION INTRAMUSCULAR; INTRAVENOUS; SUBCUTANEOUS at 23:59

## 2017-02-12 ASSESSMENT — ENCOUNTER SYMPTOMS
ABDOMINAL PAIN: 1
FEVER: 0
WEAKNESS: 1
FLANK PAIN: 1
PSYCHIATRIC NEGATIVE: 1
COUGH: 1
CARDIOVASCULAR NEGATIVE: 1
DIZZINESS: 0
MUSCULOSKELETAL NEGATIVE: 1
CHILLS: 0
SPUTUM PRODUCTION: 0
FOCAL WEAKNESS: 1
EYES NEGATIVE: 1
VOMITING: 0

## 2017-02-12 ASSESSMENT — PAIN SCALES - GENERAL
PAINLEVEL_OUTOF10: 7
PAINLEVEL_OUTOF10: 9
PAINLEVEL_OUTOF10: 8
PAINLEVEL_OUTOF10: 7
PAINLEVEL_OUTOF10: 8
PAINLEVEL_OUTOF10: 7
PAINLEVEL_OUTOF10: 7

## 2017-02-12 NOTE — CARE PLAN
Problem: Infection  Goal: Will remain free from infection  Outcome: PROGRESSING AS EXPECTED  Vital signs stable. Pt and family educated on proper hand hygiene. Scrub access port for at least 15 seconds.     Problem: Knowledge Deficit  Goal: Knowledge of disease process/condition, treatment plan, diagnostic tests, and medications will improve  Outcome: PROGRESSING AS EXPECTED  Pt educated regarding plan of care and medications. All questions answered.

## 2017-02-12 NOTE — PROGRESS NOTES
Hospital Medicine Progress Note, Adult, Complex               Author: Danniechaz Shaikh Date & Time created: 2/12/2017  3:26 PM     Interval History:  Patient seen and examined today.    Patient tolerating treatment and therapies.  All Data, Medication data reviewed.  Case discussed with nursing as available.  Plan of Care reviewed with patient and notified of changes.  2/7 Pt admitted with generalized pain, R flank pain and malaise, noncompliant with Dialysis and RX, INR low, though states she take coumadin  Asking for more pain RX, CT chest ordered, gas in the portal vein noted, consulted surgery for eval  Concern for intraabdominal infection, process..  kindly evaluated  2/8 Pt tearful again today, still with pain R chest and back, explained Ct findings and suggested R pleural tap, Pt agree's, still genralized chronic pain, for RRT today, d/w RN  2/9 Pt still with sign generalized pain, Pleural tap unable to do safely, Pt frustrated, d/w Dr. Collado and he recommends continued pain control and o/p f/u  Pt wanted to get on a different HD schedule, explained plan of care, Pt feels she needs better pain control and d/c in am  2/10 Pt with ongoing pain and explained need to reduce inflammation, consider pericarditis Dx  2/11 Pt feels some better, better pain control, difficult social situation and lack of financial support, D/w dialysis liason  2/12 Pt remains in pain and frustrated , no HD chair assigned and d/w Nephro yesterday, unclear options for d/c, social difficulties  Review of Systems:  Review of Systems   Constitutional: Positive for malaise/fatigue. Negative for fever and chills.   HENT: Negative.    Eyes: Negative.    Respiratory: Positive for cough. Negative for sputum production.    Cardiovascular: Negative.    Gastrointestinal: Positive for abdominal pain. Negative for vomiting.   Genitourinary: Positive for flank pain. Negative for urgency.   Musculoskeletal: Negative.    Skin: Negative.   Negative for rash.   Neurological: Positive for focal weakness and weakness. Negative for dizziness.   Endo/Heme/Allergies: Negative.    Psychiatric/Behavioral: Negative.    All other systems reviewed and are negative.      Physical Exam:  Physical Exam   Constitutional: She is oriented to person, place, and time. She appears well-developed and well-nourished.   HENT:   Head: Normocephalic and atraumatic.   Nose: Nose normal.   Mouth/Throat: Oropharynx is clear and moist.   Eyes: Conjunctivae and EOM are normal. Pupils are equal, round, and reactive to light.   Neck: Normal range of motion. Neck supple. No JVD present. No thyromegaly present.   Cardiovascular: Normal rate, regular rhythm and normal heart sounds.  Exam reveals no gallop and no friction rub.    Pulmonary/Chest: Effort normal and breath sounds normal. No respiratory distress. She has no wheezes. She has no rales.   L sided port   Abdominal: Soft. She exhibits no distension and no mass. There is tenderness. There is no rebound and no guarding.   Musculoskeletal: Normal range of motion. She exhibits edema. She exhibits no tenderness.   Lymphadenopathy:     She has no cervical adenopathy.   Neurological: She is alert and oriented to person, place, and time. No cranial nerve deficit.   Skin: Skin is warm and dry. She is not diaphoretic.   Psychiatric: She has a normal mood and affect. Her behavior is normal.   Nursing note and vitals reviewed.      Labs:        Invalid input(s): [id number]  Recent Labs      02/10/17   0915   TROPONINI  <0.01     Recent Labs      02/10/17   0400  02/11/17 0245 02/12/17 0413   SODIUM  136  131*  136   POTASSIUM  4.4  4.4  4.6   CHLORIDE  99  94*  99   CO2  27  29  24   BUN  36*  21  46*   CREATININE  6.13*  4.38*  6.04*   MAGNESIUM   --   2.2  2.4   PHOSPHORUS   --   2.9  4.2   CALCIUM  8.5  9.4  9.1     Recent Labs      02/10/17   0400  02/11/17   0245  02/12/17 0413   ALTSGPT   --   14   --    ASTSGOT   --   13    --    ALKPHOSPHAT   --   61   --    TBILIRUBIN   --   0.4   --    GLUCOSE  123*  132*  116*     Recent Labs      02/10/17   0400  02/10/17   0915  17   0245  17   0413   RBC  2.09*   --   3.44*   --    HEMOGLOBIN  6.5*   --   10.4*   --    HEMATOCRIT  20.9*   --   33.1*   --    PLATELETCT  178   --   186   --    PROTHROMBTM   --    --   23.7*  34.3*   INR   --    --   2.04*  3.27*   IRON   --   21*   --    --    FERRITIN   --   135.2   --    --    TOTIRONBC   --   210*   --    --      Recent Labs      02/10/17   0400  02/11/17   024   WBC  5.9  5.3   NEUTSPOLYS   --   88.30*   LYMPHOCYTES   --   7.90*   MONOCYTES   --   1.90   EOSINOPHILS   --   0.00   BASOPHILS   --   0.40   ASTSGOT   --   13   ALTSGPT   --   14   ALKPHOSPHAT   --   61   TBILIRUBIN   --   0.4           Hemodynamics:  Temp (24hrs), Av.4 °C (97.6 °F), Min:36.3 °C (97.3 °F), Max:36.7 °C (98 °F)  Temperature: 36.3 °C (97.3 °F)  Pulse  Av.4  Min: 65  Max: 122  Blood Pressure: 114/82 mmHg     Respiratory:    Respiration: 20, Pulse Oximetry: 93 %     Work Of Breathing / Effort: Mild  RUL Breath Sounds: Clear, RML Breath Sounds: Clear, RLL Breath Sounds: Clear, LASHANDA Breath Sounds: Clear, LLL Breath Sounds: Clear  Fluids:    Intake/Output Summary (Last 24 hours) at 17 1526  Last data filed at 17 2300   Gross per 24 hour   Intake    500 ml   Output      0 ml   Net    500 ml     Weight: 84.7 kg (186 lb 11.7 oz)  GI/Nutrition:  Orders Placed This Encounter   Procedures   • DIET ORDER     Standing Status: Standing      Number of Occurrences: 1      Standing Expiration Date:      Order Specific Question:  Diet:     Answer:  Renal [8]     Medical Decision Making, by Problem:  Active Hospital Problems    Diagnosis   • Chest pain with pericardial effusion  ? Of pericarditis, high inflammatory markers  D/w Rheum  EKG not abnormal, Trop negative  Would get Echo and start steroids      Abdominal pain [R10.9], CT without contrast  negative, f/u CT chest with portal vein gas  F/u scan benign, surgery evaluated and found not an issue    R pleural fluid with loculation, seems benign, tap not advised  Will check ESR/CRP     • UTI (urinary tract infection) [N39.0] on Abx, f/u CX's, entercoccus  Faec., sens to Amp   • Fibromyalgia [M79.7] chr. 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 Disease, as well as severe iron deficiency  Needed transfusion, will replace Iron IV  Plan  C/w unasyn, d/w Pharm  C/w steroids  C/w current RX and abx  See orders  Adjusted her pain RX   Need o/p HD set up prior to leaving,   Pt wants to change nephrology group/ unclear dispo as of yet    Medications reviewed, Radiology images reviewed and Labs reviewed  Taylor catheter: No Taylor  Central line in place: Need for access    DVT Prophylaxis: Warfarin (Coumadin) and Heparin      Antibiotics: Treating active infection/contamination beyond 24 hours perioperative coverage  Assessed for rehab: Patient returned to prior level of function, rehabilitation not indicated at this time

## 2017-02-12 NOTE — PROGRESS NOTES
Inpatient Anticoagulation Service Note    Date: 2/12/2017  Reason for Anticoagulation: Deep Vein Thrombosis, Other (Comments) (Hx SLE)        Hemoglobin Value: 10.4  Hematocrit Value: 33.1  Lab Platelet Value: 186  Target INR: 2.0 to 3.0    INR from last 7 days     Date/Time INR Value    02/12/17 0413 (!)3.27    02/11/17 0245 (!)2.04    02/09/17 0300 (!)1.46    02/08/17 0251 (!)1.52    02/07/17 0234 (!)1.2    02/06/17 1012 (!)1.23        Dose from last 7 days     Date/Time Dose (mg)    02/12/17 1300 0    02/11/17 1200 5    02/10/17 1500 7.5    02/09/17 1100 7.5    02/08/17 1200 0    02/07/17 1200 7.5    02/06/17 0800 7.5        Average Dose (mg):  (Home Dose: 7.5 mg Su/Tu and 5 mg ROW per chart review)  Significant Interactions: Antibiotics, Other (Comments), Corticosteroids (calcium acetate, buproprion, hydroxychloroquine)  Bridge Therapy: No (Heparin 5000 units SQ Q 8 hours)     Reversal Agent Administered: Not Applicable  Comments: INR with large jump overnight, now supratherapeutic.  No S/S bleeding noted. Will hold dose tonight and re-assess tomorrow. Likely high secondary to multiple new drug/drug interactions. Will continue to follow.    Plan:  HOLD dose tonight, INR tomorrow  Education Material Provided?: Yes (no questions, pt declines packet)  Pharmacist suggested discharge dosing: possibly home dose of 7.5 mg Sundays and Tuesdays, 5 mg all other days     Tamica Weber, PHARMD

## 2017-02-12 NOTE — PROGRESS NOTES
Bedside report received. POC discussed with pt; all questions answered at this time. Call light and personal belongings within reach. Proper fall precautions in place.

## 2017-02-13 LAB
CH50 SERPL-ACNC: 123 CAE UNITS (ref 60–144)
DSDNA AB TITR SER CLIF: DETECTED {TITER}
DSDNA IGG TITR SER CLIF: ABNORMAL {TITER}
INR PPP: 3 (ref 0.87–1.13)
PROTHROMBIN TIME: 32.1 SEC (ref 12–14.6)

## 2017-02-13 PROCEDURE — A9270 NON-COVERED ITEM OR SERVICE: HCPCS

## 2017-02-13 PROCEDURE — 700102 HCHG RX REV CODE 250 W/ 637 OVERRIDE(OP)

## 2017-02-13 PROCEDURE — 700101 HCHG RX REV CODE 250: Performed by: HOSPITALIST

## 2017-02-13 PROCEDURE — 700111 HCHG RX REV CODE 636 W/ 250 OVERRIDE (IP): Performed by: INTERNAL MEDICINE

## 2017-02-13 PROCEDURE — 700111 HCHG RX REV CODE 636 W/ 250 OVERRIDE (IP): Performed by: HOSPITALIST

## 2017-02-13 PROCEDURE — 700102 HCHG RX REV CODE 250 W/ 637 OVERRIDE(OP): Performed by: INTERNAL MEDICINE

## 2017-02-13 PROCEDURE — A9270 NON-COVERED ITEM OR SERVICE: HCPCS | Performed by: HOSPITALIST

## 2017-02-13 PROCEDURE — 700111 HCHG RX REV CODE 636 W/ 250 OVERRIDE (IP)

## 2017-02-13 PROCEDURE — A9270 NON-COVERED ITEM OR SERVICE: HCPCS | Performed by: INTERNAL MEDICINE

## 2017-02-13 PROCEDURE — 700111 HCHG RX REV CODE 636 W/ 250 OVERRIDE (IP): Performed by: NURSE PRACTITIONER

## 2017-02-13 PROCEDURE — 85610 PROTHROMBIN TIME: CPT

## 2017-02-13 PROCEDURE — 90935 HEMODIALYSIS ONE EVALUATION: CPT

## 2017-02-13 PROCEDURE — 700105 HCHG RX REV CODE 258: Performed by: HOSPITALIST

## 2017-02-13 PROCEDURE — 99232 SBSQ HOSP IP/OBS MODERATE 35: CPT | Performed by: HOSPITALIST

## 2017-02-13 PROCEDURE — 700102 HCHG RX REV CODE 250 W/ 637 OVERRIDE(OP): Performed by: HOSPITALIST

## 2017-02-13 PROCEDURE — 770020 HCHG ROOM/CARE - TELE (206)

## 2017-02-13 RX ORDER — HEPARIN SODIUM 1000 [USP'U]/ML
INJECTION, SOLUTION INTRAVENOUS; SUBCUTANEOUS
Status: COMPLETED
Start: 2017-02-13 | End: 2017-02-13

## 2017-02-13 RX ORDER — PREDNISONE 20 MG/1
40 TABLET ORAL DAILY
Status: DISCONTINUED | OUTPATIENT
Start: 2017-02-14 | End: 2017-02-15

## 2017-02-13 RX ORDER — WARFARIN SODIUM 5 MG/1
5 TABLET ORAL
Status: COMPLETED | OUTPATIENT
Start: 2017-02-13 | End: 2017-02-13

## 2017-02-13 RX ADMIN — HEPARIN SODIUM 2000 UNITS: 1000 INJECTION, SOLUTION INTRAVENOUS; SUBCUTANEOUS at 11:33

## 2017-02-13 RX ADMIN — HYDROXYCHLOROQUINE SULFATE 200 MG: 200 TABLET ORAL at 20:09

## 2017-02-13 RX ADMIN — Medication 667 MG: at 07:51

## 2017-02-13 RX ADMIN — ERYTHROPOIETIN 10000 UNITS: 10000 INJECTION, SOLUTION INTRAVENOUS; SUBCUTANEOUS at 07:52

## 2017-02-13 RX ADMIN — HYDROMORPHONE HYDROCHLORIDE 0.5 MG: 1 INJECTION, SOLUTION INTRAMUSCULAR; INTRAVENOUS; SUBCUTANEOUS at 04:30

## 2017-02-13 RX ADMIN — BUPROPION HYDROCHLORIDE 150 MG: 150 TABLET, FILM COATED, EXTENDED RELEASE ORAL at 07:51

## 2017-02-13 RX ADMIN — PREDNISONE 60 MG: 20 TABLET ORAL at 07:52

## 2017-02-13 RX ADMIN — OXYCODONE HYDROCHLORIDE 20 MG: 10 TABLET ORAL at 02:59

## 2017-02-13 RX ADMIN — STANDARDIZED SENNA CONCENTRATE AND DOCUSATE SODIUM 1 TABLET: 8.6; 5 TABLET, FILM COATED ORAL at 20:11

## 2017-02-13 RX ADMIN — ONDANSETRON 4 MG: 2 INJECTION, SOLUTION INTRAMUSCULAR; INTRAVENOUS at 13:43

## 2017-02-13 RX ADMIN — OXYCODONE HYDROCHLORIDE 20 MG: 10 TABLET ORAL at 10:44

## 2017-02-13 RX ADMIN — IRON SUCROSE 200 MG: 20 INJECTION, SOLUTION INTRAVENOUS at 08:50

## 2017-02-13 RX ADMIN — OXYCODONE HYDROCHLORIDE 20 MG: 10 TABLET ORAL at 21:10

## 2017-02-13 RX ADMIN — HYDROMORPHONE HYDROCHLORIDE 0.5 MG: 1 INJECTION, SOLUTION INTRAMUSCULAR; INTRAVENOUS; SUBCUTANEOUS at 13:44

## 2017-02-13 RX ADMIN — PREGABALIN 25 MG: 25 CAPSULE ORAL at 07:52

## 2017-02-13 RX ADMIN — OXYCODONE HYDROCHLORIDE 20 MG: 10 TABLET ORAL at 15:05

## 2017-02-13 RX ADMIN — WARFARIN SODIUM 5 MG: 5 TABLET ORAL at 17:59

## 2017-02-13 RX ADMIN — LIDOCAINE 1 PATCH: 50 PATCH CUTANEOUS at 20:09

## 2017-02-13 RX ADMIN — PROMETHAZINE HYDROCHLORIDE 25 MG: 25 TABLET ORAL at 22:30

## 2017-02-13 RX ADMIN — HYDROMORPHONE HYDROCHLORIDE 0.5 MG: 1 INJECTION, SOLUTION INTRAMUSCULAR; INTRAVENOUS; SUBCUTANEOUS at 08:50

## 2017-02-13 RX ADMIN — POTASSIUM CHLORIDE 20 MEQ: 1500 TABLET, EXTENDED RELEASE ORAL at 07:52

## 2017-02-13 RX ADMIN — Medication 667 MG: at 17:59

## 2017-02-13 RX ADMIN — OXYCODONE HYDROCHLORIDE 30 MG: 10 TABLET, FILM COATED, EXTENDED RELEASE ORAL at 07:51

## 2017-02-13 RX ADMIN — PROMETHAZINE HYDROCHLORIDE 25 MG: 25 TABLET ORAL at 04:29

## 2017-02-13 RX ADMIN — HYDROMORPHONE HYDROCHLORIDE 0.5 MG: 1 INJECTION, SOLUTION INTRAMUSCULAR; INTRAVENOUS; SUBCUTANEOUS at 17:58

## 2017-02-13 RX ADMIN — Medication 667 MG: at 10:44

## 2017-02-13 RX ADMIN — TRAZODONE HYDROCHLORIDE 50 MG: 50 TABLET ORAL at 20:11

## 2017-02-13 RX ADMIN — AMPICILLIN SODIUM AND SULBACTAM SODIUM 3 G: 2; 1 INJECTION, POWDER, FOR SOLUTION INTRAMUSCULAR; INTRAVENOUS at 07:51

## 2017-02-13 RX ADMIN — PREGABALIN 50 MG: 25 CAPSULE ORAL at 20:08

## 2017-02-13 RX ADMIN — ONDANSETRON 4 MG: 2 INJECTION, SOLUTION INTRAMUSCULAR; INTRAVENOUS at 18:00

## 2017-02-13 RX ADMIN — OXYCODONE HYDROCHLORIDE 30 MG: 10 TABLET, FILM COATED, EXTENDED RELEASE ORAL at 20:11

## 2017-02-13 RX ADMIN — HYDROMORPHONE HYDROCHLORIDE 0.5 MG: 1 INJECTION, SOLUTION INTRAMUSCULAR; INTRAVENOUS; SUBCUTANEOUS at 22:28

## 2017-02-13 RX ADMIN — TIZANIDINE 4 MG: 4 TABLET ORAL at 20:10

## 2017-02-13 ASSESSMENT — ENCOUNTER SYMPTOMS
PALPITATIONS: 0
HEADACHES: 0
COUGH: 1
DIZZINESS: 0
HEMOPTYSIS: 0
BACK PAIN: 1
ABDOMINAL PAIN: 0
FLANK PAIN: 1
SENSORY CHANGE: 0
FEVER: 0
COUGH: 0
CHILLS: 0
DIARRHEA: 0
CARDIOVASCULAR NEGATIVE: 1
ORTHOPNEA: 0
MUSCULOSKELETAL NEGATIVE: 1
ABDOMINAL PAIN: 1
WHEEZING: 0
FOCAL WEAKNESS: 1
WEAKNESS: 1
SHORTNESS OF BREATH: 0
NAUSEA: 0
EYES NEGATIVE: 1
SPUTUM PRODUCTION: 0
PSYCHIATRIC NEGATIVE: 1
VOMITING: 0
HEARTBURN: 0
MYALGIAS: 1

## 2017-02-13 ASSESSMENT — PAIN SCALES - GENERAL
PAINLEVEL_OUTOF10: 7
PAINLEVEL_OUTOF10: 8

## 2017-02-13 NOTE — PROGRESS NOTES
Hospital Medicine Progress Note, Adult, Complex               Author: Spike Gotti Date & Time created: 2/12/2017  9:20 PM     Interval History:  36 year old with ESRD, SLE, who stopped coming to dialysis and subsequently came in with uremia.     Complements normal, anti-ds dna pending.   Discussed at length with patient regarding condition, dialysis and barriers to treatment.  Awaiting acceptance to HD unit.    Review of Systems:  Review of Systems   Constitutional: Positive for malaise/fatigue.   Neurological: Positive for weakness.       Physical Exam:  Physical Exam   Constitutional: She appears well-developed. She appears ill.   Cardiovascular: Normal rate and regular rhythm.    Pulmonary/Chest: Effort normal and breath sounds normal.   Skin: Skin is warm and dry.       Labs:        Invalid input(s): [id number]  Recent Labs      02/10/17   0915   TROPONINI  <0.01     Recent Labs      02/10/17   0400  02/11/17   0245  02/12/17   0413   SODIUM  136  131*  136   POTASSIUM  4.4  4.4  4.6   CHLORIDE  99  94*  99   CO2  27  29  24   BUN  36*  21  46*   CREATININE  6.13*  4.38*  6.04*   MAGNESIUM   --   2.2  2.4   PHOSPHORUS   --   2.9  4.2   CALCIUM  8.5  9.4  9.1     Recent Labs      02/10/17   0400  02/11/17   0245  02/12/17   0413   ALTSGPT   --   14   --    ASTSGOT   --   13   --    ALKPHOSPHAT   --   61   --    TBILIRUBIN   --   0.4   --    GLUCOSE  123*  132*  116*     Recent Labs      02/10/17   0400  02/10/17   0915  02/11/17   0245  02/12/17   0413   RBC  2.09*   --   3.44*   --    HEMOGLOBIN  6.5*   --   10.4*   --    HEMATOCRIT  20.9*   --   33.1*   --    PLATELETCT  178   --   186   --    PROTHROMBTM   --    --   23.7*  34.3*   INR   --    --   2.04*  3.27*   IRON   --   21*   --    --    FERRITIN   --   135.2   --    --    TOTIRONBC   --   210*   --    --      Recent Labs      02/10/17   0400  02/11/17   0245   WBC  5.9  5.3   NEUTSPOLYS   --   88.30*   LYMPHOCYTES   --   7.90*   MONOCYTES   --    1.90   EOSINOPHILS   --   0.00   BASOPHILS   --   0.40   ASTSGOT   --   13   ALTSGPT   --   14   ALKPHOSPHAT   --   61   TBILIRUBIN   --   0.4           Hemodynamics:  Temp (24hrs), Av.4 °C (97.6 °F), Min:36.3 °C (97.3 °F), Max:36.7 °C (98 °F)  Temperature: 36.4 °C (97.6 °F)  Pulse  Av.5  Min: 65  Max: 122  Blood Pressure: 127/83 mmHg     Respiratory:    Respiration: 16, Pulse Oximetry: 97 %        RUL Breath Sounds: Clear, RML Breath Sounds: Clear, RLL Breath Sounds: Clear, LASHANDA Breath Sounds: Clear, LLL Breath Sounds: Clear  Fluids:    Intake/Output Summary (Last 24 hours) at 17  Last data filed at 17 2300   Gross per 24 hour   Intake    500 ml   Output      0 ml   Net    500 ml     Weight: 84.4 kg (186 lb 1.1 oz)  GI/Nutrition:  Orders Placed This Encounter   Procedures   • DIET ORDER     Standing Status: Standing      Number of Occurrences: 1      Standing Expiration Date:      Order Specific Question:  Diet:     Answer:  Renal [8]     Medical Decision Making, by Problem:  Active Hospital Problems    Diagnosis   • Fibromyalgia [M79.7]   • Hypokalemia [E87.6]   • Metabolic acidosis [E87.2]   • Hypertension [I10]   • ESRD on dialysis (CMS-HCC) [N18.6, Z99.2]   • History of lupus nephritis [Z87.448]     1. ESRD - Was noncompliant with treatment, on dialysis now  2. Anemia - Epogen on dialysis, Hgb 10.4, on venofer  3. Pericardial effusion - History of SLE, was also uremic due to noncompliance with HD  4. SLE - Complements normal, anti-dsDNA pending      -HD next on Monday  -Discussed condition with patient, she states that she understands what happened and does not believe any accomodation is needed, rather she will change her schedule to fit the dialysis schedule. She is frustrated in not having an assigned schedule yet.  -She is considering multiple dialysis units to discharge to    [facility]

## 2017-02-13 NOTE — PROGRESS NOTES
Handoff received. Pt is awaiting dialysis chair. Effusions resolving. PD not an option. Home dialysis discussed, but unlikely. Finances a concern. MWF dialysis. Pain mngt continues. Bed low and locked, call bell in reach. Sr on tele.

## 2017-02-13 NOTE — CARE PLAN
Problem: Infection  Goal: Will remain free from infection  Outcome: PROGRESSING AS EXPECTED  Pt receiving IV abx and MEWs scores are being monitored.    Problem: Pain Management  Goal: Pain level will decrease to patient’s comfort goal  Outcome: PROGRESSING AS EXPECTED  Pt c/o pain of 8 on 0-10 pain scale. Pt medicated appropriately for pain.

## 2017-02-13 NOTE — PROGRESS NOTES
Assumed pt care at 1900. Received bedside report from RN. PM assessment completed. AAOx4. Pt denies SOB; c/o pain of 8 on 0 to 10 pain scale (Will administer medication PRN - see MAR). Provided pt with RN and CNA extension numbers on white board and encouraged pt to call when needed. Discussed plan of care for the night, pt verbalizes understanding. VSS. Denies any additional needs at this time. Call light, belongings, and phone within reach. Hourly rounding in effect.

## 2017-02-13 NOTE — CARE PLAN
Problem: Knowledge Deficit  Goal: Knowledge of disease process/condition, treatment plan, diagnostic tests, and medications will improve  Intervention: Assess knowledge level of disease process/condition, treatment plan, diagnostic tests, and medications  Pt educated regarding activity, diet, meds and plan of care. Verbalized understanding.       Problem: Pain Management  Goal: Pain level will decrease to patient’s comfort goal  Intervention: Follow pain managment plan developed in collaboration with patient and Interdisciplinary Team  Pt assessed for pain ever 4 hours. Pharmacological and non-pharmacological interventions used accordingly and charted. Pt instructed to notify RN of any new or increasing pain to prevent it from becoming intolerable.

## 2017-02-13 NOTE — PROGRESS NOTES
Nephrology Progress Note, Adult, Complex               Author: Leana Sivakumar Date & Time created: 2/13/2017  3:27 PM     Interval History:  36 year old with ESRD, SLE, who stopped coming to dialysis and subsequently came in with uremia.   Complements normal, anti-ds dna detected  Discussed at length with patient regarding condition, dialysis and barriers to treatment.  Awaiting acceptance to HD unit.  Seen and examined during Hd -tolerates well  Review of Systems:  Review of Systems   Constitutional: Positive for malaise/fatigue. Negative for fever and chills.   Eyes: Negative.    Respiratory: Negative for cough, hemoptysis, shortness of breath and wheezing.    Cardiovascular: Negative for chest pain, palpitations, orthopnea and leg swelling.   Gastrointestinal: Negative for heartburn, nausea, vomiting, abdominal pain and diarrhea.   Genitourinary: Negative for dysuria.   Musculoskeletal: Positive for myalgias, back pain and joint pain.   Skin: Negative for itching and rash.   Neurological: Positive for focal weakness. Negative for dizziness, sensory change and headaches.       Physical Exam:  Physical Exam   Constitutional: She is oriented to person, place, and time. She appears well-developed. No distress.   HENT:   Head: Normocephalic and atraumatic.   Mouth/Throat: Oropharynx is clear and moist.   Eyes: Conjunctivae and EOM are normal. Pupils are equal, round, and reactive to light. No scleral icterus.   Neck: Normal range of motion. Neck supple. No thyromegaly present.   Cardiovascular: Normal rate and regular rhythm.    Pulmonary/Chest: Effort normal and breath sounds normal.   Abdominal: Soft. Bowel sounds are normal. She exhibits no distension and no mass. There is no tenderness.   Musculoskeletal: She exhibits no edema.   Lymphadenopathy:     She has no cervical adenopathy.   Neurological: She is alert and oriented to person, place, and time. No cranial nerve deficit.   Skin: Skin is warm and dry.        Labs:        Invalid input(s): [id number]      Recent Labs      17   SODIUM  131*  136   POTASSIUM  4.4  4.6   CHLORIDE  94*  99   CO2  29  24   BUN  21  46*   CREATININE  4.38*  6.04*   MAGNESIUM  2.2  2.4   PHOSPHORUS  2.9  4.2   CALCIUM  9.4  9.1     Recent Labs      173   ALTSGPT  14   --    ASTSGOT  13   --    ALKPHOSPHAT  61   --    TBILIRUBIN  0.4   --    GLUCOSE  132*  116*     Recent Labs      17   024173  17   0442   RBC  3.44*   --    --    HEMOGLOBIN  10.4*   --    --    HEMATOCRIT  33.1*   --    --    PLATELETCT  186   --    --    PROTHROMBTM  23.7*  34.3*  32.1*   INR  2.04*  3.27*  3.00*     Recent Labs      17   WBC  5.3   NEUTSPOLYS  88.30*   LYMPHOCYTES  7.90*   MONOCYTES  1.90   EOSINOPHILS  0.00   BASOPHILS  0.40   ASTSGOT  13   ALTSGPT  14   ALKPHOSPHAT  61   TBILIRUBIN  0.4           Hemodynamics:  Temp (24hrs), Av.5 °C (97.7 °F), Min:36.3 °C (97.4 °F), Max:36.6 °C (97.9 °F)  Temperature:  (At diaylsis)  Pulse  Av.3  Min: 65  Max: 122  Blood Pressure:  (at diaylsis)     Respiratory:    Respiration: 20, Pulse Oximetry: 94 %        RUL Breath Sounds: Clear, RML Breath Sounds: Clear, RLL Breath Sounds: Clear, LASHANDA Breath Sounds: Clear, LLL Breath Sounds: Clear  Fluids:    Intake/Output Summary (Last 24 hours) at 17 1527  Last data filed at 17 1000   Gross per 24 hour   Intake    500 ml   Output      0 ml   Net    500 ml     Weight: 84.4 kg (186 lb 1.1 oz)  GI/Nutrition:  Orders Placed This Encounter   Procedures   • DIET ORDER     Standing Status: Standing      Number of Occurrences: 1      Standing Expiration Date:      Order Specific Question:  Diet:     Answer:  Renal [8]     Medical Decision Making, by Problem:  Active Hospital Problems    Diagnosis   • Fibromyalgia [M79.7]   • Hypokalemia [E87.6]   • Metabolic acidosis [E87.2]   • Hypertension [I10]   • ESRD on  dialysis (CMS-[facility]) [N18.6, Z99.2]   • History of lupus nephritis [Z87.448]     1. ESRD - noncompliant with treatment, seen and examined during HD - please see dialysis flow sheet for details  2. Anemia - Epogen with dialysis, Hgb at goal  3. Pericardial effusion - History of SLE, was also uremic due to noncompliance with HD  4. SLE - Complements normal, anti-dsDNA detected    Recs: needs outpt dialysis chair -Pt is checking dialysis units             Rheumatology f/u  Labs reviewed and Medications reviewed

## 2017-02-13 NOTE — PROGRESS NOTES
Inpatient Anticoagulation Service Note    Date: 2/13/2017  Reason for Anticoagulation: Deep Vein Thrombosis, Other (Comments) (Hx SLE)        Hemoglobin Value: 10.4  Hematocrit Value: 33.1  Lab Platelet Value: 186  Target INR: 2.0 to 3.0    INR from last 7 days     Date/Time INR Value    02/13/17 0442 (!)3    02/12/17 0413 (!)3.27    02/11/17 0245 (!)2.04    02/09/17 0300 (!)1.46    02/08/17 0251 (!)1.52    02/07/17 0234 (!)1.2        Dose from last 7 days     Date/Time Dose (mg)    02/13/17 1000 5    02/12/17 1300 0    02/11/17 1200 5    02/10/17 1500 7.5    02/09/17 1100 7.5    02/08/17 1200 0    02/07/17 1200 7.5        Average Dose (mg):  (Home Dose: 7.5 mg Su/Tu and 5 mg ROW per chart review)  Significant Interactions: Antibiotics, Other (Comments), Corticosteroids (calcium acetate, buproprion, hydroxychloroquine)  Bridge Therapy: No (Heparin 5000 units SQ Q 8 hours)     Reversal Agent Administered: Not Applicable  Comments: INR 3.0, therapeutic today.  Will give home dose of 5 mg today. NO S/S bleeding noted. No change to interacting meds. Unasyn does have a stop date for 2/14. Will continue to follow.    Plan:  5 mg tonight, likely resume home dose tomorrow  Education Material Provided?: Yes (no questions, pt declines packet)  Pharmacist suggested discharge dosing: home dose of 7.5 mg Sundays and Tuesdays, 5 mg all other days     Tamica Weber, ORIANAD

## 2017-02-13 NOTE — DISCHARGE PLANNING
Met with pt to discuss OP dialysis center and nephrologist options.  Pt has decided that she would like to stay with [facility], and would like referral sent to [facility].  TC to Radha at [facility] to cancel previous transfer request.  Referral has been sent to Loren Messer and will follow for clinic acceptance.

## 2017-02-13 NOTE — CARE PLAN
Problem: Pain Management  Goal: Pain level will decrease to patient’s comfort goal  Outcome: PROGRESSING AS EXPECTED  Pt management continues as per home regimen.    Problem: Urinary Elimination:  Goal: Ability to reestablish a normal urinary elimination pattern will improve  Outcome: PROGRESSING AS EXPECTED  No issues voiding.

## 2017-02-13 NOTE — DISCHARGE PLANNING
Transitional Care Navigator:    Palliative consult requested during morning rounds r/t pts chronic pain, frequent admissions, and multiple conditions.

## 2017-02-14 LAB
CRP SERPL HS-MCNC: 0.77 MG/DL (ref 0–0.75)
ERYTHROCYTE [DISTWIDTH] IN BLOOD BY AUTOMATED COUNT: 50.3 FL (ref 35.9–50)
ERYTHROCYTE [SEDIMENTATION RATE] IN BLOOD BY WESTERGREN METHOD: 51 MM/HOUR (ref 0–20)
HCT VFR BLD AUTO: 31.6 % (ref 37–47)
HGB BLD-MCNC: 9.8 G/DL (ref 12–16)
INR PPP: 2.82 (ref 0.87–1.13)
MCH RBC QN AUTO: 30.7 PG (ref 27–33)
MCHC RBC AUTO-ENTMCNC: 31 G/DL (ref 33.6–35)
MCV RBC AUTO: 99.1 FL (ref 81.4–97.8)
PLATELET # BLD AUTO: 175 K/UL (ref 164–446)
PMV BLD AUTO: 10.9 FL (ref 9–12.9)
PROTHROMBIN TIME: 30.5 SEC (ref 12–14.6)
RBC # BLD AUTO: 3.19 M/UL (ref 4.2–5.4)
WBC # BLD AUTO: 15 K/UL (ref 4.8–10.8)

## 2017-02-14 PROCEDURE — 85652 RBC SED RATE AUTOMATED: CPT

## 2017-02-14 PROCEDURE — 700102 HCHG RX REV CODE 250 W/ 637 OVERRIDE(OP): Performed by: HOSPITALIST

## 2017-02-14 PROCEDURE — 700102 HCHG RX REV CODE 250 W/ 637 OVERRIDE(OP)

## 2017-02-14 PROCEDURE — 700102 HCHG RX REV CODE 250 W/ 637 OVERRIDE(OP): Performed by: INTERNAL MEDICINE

## 2017-02-14 PROCEDURE — A9270 NON-COVERED ITEM OR SERVICE: HCPCS | Performed by: HOSPITALIST

## 2017-02-14 PROCEDURE — 86140 C-REACTIVE PROTEIN: CPT

## 2017-02-14 PROCEDURE — 85610 PROTHROMBIN TIME: CPT

## 2017-02-14 PROCEDURE — 700105 HCHG RX REV CODE 258: Performed by: HOSPITALIST

## 2017-02-14 PROCEDURE — 700111 HCHG RX REV CODE 636 W/ 250 OVERRIDE (IP): Performed by: NURSE PRACTITIONER

## 2017-02-14 PROCEDURE — 700105 HCHG RX REV CODE 258

## 2017-02-14 PROCEDURE — 770006 HCHG ROOM/CARE - MED/SURG/GYN SEMI*

## 2017-02-14 PROCEDURE — A9270 NON-COVERED ITEM OR SERVICE: HCPCS | Performed by: INTERNAL MEDICINE

## 2017-02-14 PROCEDURE — 85027 COMPLETE CBC AUTOMATED: CPT

## 2017-02-14 PROCEDURE — 700101 HCHG RX REV CODE 250: Performed by: HOSPITALIST

## 2017-02-14 PROCEDURE — A9270 NON-COVERED ITEM OR SERVICE: HCPCS

## 2017-02-14 PROCEDURE — 99232 SBSQ HOSP IP/OBS MODERATE 35: CPT | Performed by: INTERNAL MEDICINE

## 2017-02-14 PROCEDURE — 700111 HCHG RX REV CODE 636 W/ 250 OVERRIDE (IP): Performed by: HOSPITALIST

## 2017-02-14 RX ORDER — WARFARIN SODIUM 5 MG/1
5 TABLET ORAL
Status: COMPLETED | OUTPATIENT
Start: 2017-02-14 | End: 2017-02-14

## 2017-02-14 RX ORDER — WARFARIN SODIUM 7.5 MG/1
7.5 TABLET ORAL
Status: DISCONTINUED | OUTPATIENT
Start: 2017-02-19 | End: 2017-02-17 | Stop reason: HOSPADM

## 2017-02-14 RX ORDER — SODIUM CHLORIDE 9 MG/ML
INJECTION, SOLUTION INTRAVENOUS
Status: COMPLETED
Start: 2017-02-14 | End: 2017-02-14

## 2017-02-14 RX ORDER — WARFARIN SODIUM 5 MG/1
5 TABLET ORAL
Status: DISCONTINUED | OUTPATIENT
Start: 2017-02-15 | End: 2017-02-17 | Stop reason: HOSPADM

## 2017-02-14 RX ADMIN — Medication 667 MG: at 18:08

## 2017-02-14 RX ADMIN — STANDARDIZED SENNA CONCENTRATE AND DOCUSATE SODIUM 1 TABLET: 8.6; 5 TABLET, FILM COATED ORAL at 20:13

## 2017-02-14 RX ADMIN — Medication 667 MG: at 08:40

## 2017-02-14 RX ADMIN — TRAZODONE HYDROCHLORIDE 50 MG: 50 TABLET ORAL at 20:13

## 2017-02-14 RX ADMIN — PREDNISONE 40 MG: 20 TABLET ORAL at 08:40

## 2017-02-14 RX ADMIN — OXYCODONE HYDROCHLORIDE 20 MG: 10 TABLET ORAL at 11:00

## 2017-02-14 RX ADMIN — PROMETHAZINE HYDROCHLORIDE 25 MG: 25 TABLET ORAL at 15:12

## 2017-02-14 RX ADMIN — BUPROPION HYDROCHLORIDE 150 MG: 150 TABLET, FILM COATED, EXTENDED RELEASE ORAL at 08:40

## 2017-02-14 RX ADMIN — OXYCODONE HYDROCHLORIDE 20 MG: 10 TABLET ORAL at 20:13

## 2017-02-14 RX ADMIN — OXYCODONE HYDROCHLORIDE 20 MG: 10 TABLET ORAL at 05:49

## 2017-02-14 RX ADMIN — HYDROMORPHONE HYDROCHLORIDE 0.5 MG: 1 INJECTION, SOLUTION INTRAMUSCULAR; INTRAVENOUS; SUBCUTANEOUS at 22:00

## 2017-02-14 RX ADMIN — WARFARIN SODIUM 5 MG: 5 TABLET ORAL at 18:11

## 2017-02-14 RX ADMIN — PREGABALIN 50 MG: 25 CAPSULE ORAL at 20:13

## 2017-02-14 RX ADMIN — OXYCODONE HYDROCHLORIDE 20 MG: 10 TABLET ORAL at 01:44

## 2017-02-14 RX ADMIN — HYDROXYCHLOROQUINE SULFATE 200 MG: 200 TABLET ORAL at 20:13

## 2017-02-14 RX ADMIN — LIDOCAINE 1 PATCH: 50 PATCH CUTANEOUS at 20:12

## 2017-02-14 RX ADMIN — HYDROMORPHONE HYDROCHLORIDE 0.5 MG: 1 INJECTION, SOLUTION INTRAMUSCULAR; INTRAVENOUS; SUBCUTANEOUS at 13:22

## 2017-02-14 RX ADMIN — POTASSIUM CHLORIDE 20 MEQ: 1500 TABLET, EXTENDED RELEASE ORAL at 08:41

## 2017-02-14 RX ADMIN — OXYCODONE HYDROCHLORIDE 20 MG: 10 TABLET ORAL at 15:07

## 2017-02-14 RX ADMIN — Medication 667 MG: at 11:04

## 2017-02-14 RX ADMIN — PROMETHAZINE HYDROCHLORIDE 25 MG: 25 TABLET ORAL at 03:01

## 2017-02-14 RX ADMIN — HYDROMORPHONE HYDROCHLORIDE 0.5 MG: 1 INJECTION, SOLUTION INTRAMUSCULAR; INTRAVENOUS; SUBCUTANEOUS at 03:01

## 2017-02-14 RX ADMIN — HYDROMORPHONE HYDROCHLORIDE 0.5 MG: 1 INJECTION, SOLUTION INTRAMUSCULAR; INTRAVENOUS; SUBCUTANEOUS at 18:09

## 2017-02-14 RX ADMIN — PREGABALIN 25 MG: 25 CAPSULE ORAL at 08:40

## 2017-02-14 RX ADMIN — OXYCODONE HYDROCHLORIDE 30 MG: 10 TABLET, FILM COATED, EXTENDED RELEASE ORAL at 08:40

## 2017-02-14 RX ADMIN — OXYCODONE HYDROCHLORIDE 20 MG: 10 TABLET ORAL at 23:53

## 2017-02-14 RX ADMIN — HYDROMORPHONE HYDROCHLORIDE 0.5 MG: 1 INJECTION, SOLUTION INTRAMUSCULAR; INTRAVENOUS; SUBCUTANEOUS at 08:36

## 2017-02-14 RX ADMIN — OXYCODONE HYDROCHLORIDE 30 MG: 10 TABLET, FILM COATED, EXTENDED RELEASE ORAL at 20:14

## 2017-02-14 RX ADMIN — PROMETHAZINE HYDROCHLORIDE 25 MG: 25 TABLET ORAL at 08:40

## 2017-02-14 RX ADMIN — TIZANIDINE 4 MG: 4 TABLET ORAL at 20:13

## 2017-02-14 RX ADMIN — IRON SUCROSE 200 MG: 20 INJECTION, SOLUTION INTRAVENOUS at 11:00

## 2017-02-14 RX ADMIN — SODIUM CHLORIDE 500 ML: 9 INJECTION, SOLUTION INTRAVENOUS at 05:50

## 2017-02-14 ASSESSMENT — PAIN SCALES - GENERAL
PAINLEVEL_OUTOF10: 9
PAINLEVEL_OUTOF10: 8
PAINLEVEL_OUTOF10: 4
PAINLEVEL_OUTOF10: 8
PAINLEVEL_OUTOF10: 4
PAINLEVEL_OUTOF10: 8
PAINLEVEL_OUTOF10: 8
PAINLEVEL_OUTOF10: 9
PAINLEVEL_OUTOF10: 9

## 2017-02-14 ASSESSMENT — ENCOUNTER SYMPTOMS
ABDOMINAL PAIN: 1
COUGH: 1
ORTHOPNEA: 0
CARDIOVASCULAR NEGATIVE: 1
SENSORY CHANGE: 0
PSYCHIATRIC NEGATIVE: 1
DIARRHEA: 0
FOCAL WEAKNESS: 1
ABDOMINAL PAIN: 0
VOMITING: 0
MYALGIAS: 1
CHILLS: 0
HEMOPTYSIS: 0
FLANK PAIN: 1
HEARTBURN: 0
WHEEZING: 0
SHORTNESS OF BREATH: 0
HEADACHES: 0
BACK PAIN: 1
NAUSEA: 0
WEAKNESS: 1
COUGH: 0
DIZZINESS: 0
EYES NEGATIVE: 1
FEVER: 0
PALPITATIONS: 0

## 2017-02-14 NOTE — PROGRESS NOTES
Inpatient Anticoagulation Service Note    Date: 2/14/2017  Reason for Anticoagulation: Deep Vein Thrombosis, Other (Comments) (Hx SLE)        Hemoglobin Value: 9.8  Hematocrit Value: 31.6  Lab Platelet Value: 175  Target INR: 2.0 to 3.0    INR from last 7 days     Date/Time INR Value    02/14/17 0331 (!)2.82    02/13/17 0442 (!)3    02/12/17 0413 (!)3.27    02/11/17 0245 (!)2.04    02/09/17 0300 (!)1.46    02/08/17 0251 (!)1.52        Dose from last 7 days     Date/Time Dose (mg)    02/14/17 1000 5    02/13/17 1000 5    02/12/17 1300 0    02/11/17 1200 5    02/10/17 1500 7.5    02/09/17 1100 7.5    02/08/17 1200 0    02/07/17 1200 7.5        Average Dose (mg):  (Home Dose: 7.5 mg Su/Tu and 5 mg ROW per chart review)  Significant Interactions: Other (Comments), Corticosteroids (calcium acetate, buproprion, hydroxychloroquine)  Bridge Therapy: No (Heparin 5000 units SQ Q 8 hours)     Reversal Agent Administered: Not Applicable  Comments: INR therapeutic again today. Will resume her home dose starting tomorrow, will still give 5 mg tonight as INR still at upper end of therapeutic range. No S/S bleeding noted. Antibiotics completed yesterday and steroid dose decreased.  No other changes to interacting medications. Will contine to follow.    Plan:  5 mg tonight, resume home dose tomorrow  Education Material Provided?: Yes (no questions, pt declines packet)  Pharmacist suggested discharge dosing: likely home dose of 7.5 mg Sundays and Tuesdays, 5 mg all other days     Tamica Weber, ORIANAD

## 2017-02-14 NOTE — DISCHARGE PLANNING
KLAUDIA spoke with Daphne vicente. Per Daphne they are currently waiting for Jannette Messer to get back to them regarding a chair time. Daphne to follow up with KLAUDIA once we know chair time for pt.

## 2017-02-14 NOTE — PROGRESS NOTES
Hospital Medicine Progress Note, Adult, Complex               Author: Dannie Shaikh Date & Time created: 2/13/2017  4:17 PM     Interval History:  Patient seen and examined today.    Patient tolerating treatment and therapies.  All Data, Medication data reviewed.  Case discussed with nursing as available.  Plan of Care reviewed with patient and notified of changes.  2/7 Pt admitted with generalized pain, R flank pain and malaise, noncompliant with Dialysis and RX, INR low, though states she take coumadin  Asking for more pain RX, CT chest ordered, gas in the portal vein noted, consulted surgery for eval  Concern for intraabdominal infection, process..  kindly evaluated  2/8 Pt tearful again today, still with pain R chest and back, explained Ct findings and suggested R pleural tap, Pt agree's, still genralized chronic pain, for RRT today, d/w RN  2/9 Pt still with sign generalized pain, Pleural tap unable to do safely, Pt frustrated, d/w Dr. Collado and he recommends continued pain control and o/p f/u  Pt wanted to get on a different HD schedule, explained plan of care, Pt feels she needs better pain control and d/c in am  2/10 Pt with ongoing pain and explained need to reduce inflammation, consider pericarditis Dx  2/11 Pt feels some better, better pain control, difficult social situation and lack of financial support, D/w dialysis liason  2/12 Pt remains in pain and frustrated , no HD chair assigned and d/w Nephro yesterday, unclear options for d/c, social difficulties  2/13 Pt feels ok, tolerated HD and d/w Dr. Shaver, she seems to have accepted her needs, dialysis chair in progress  Review of Systems:  Review of Systems   Constitutional: Positive for malaise/fatigue. Negative for fever and chills.   HENT: Negative.    Eyes: Negative.    Respiratory: Positive for cough. Negative for sputum production.    Cardiovascular: Negative.    Gastrointestinal: Positive for abdominal pain. Negative for vomiting.    Genitourinary: Positive for flank pain. Negative for urgency.   Musculoskeletal: Negative.    Skin: Negative.  Negative for rash.   Neurological: Positive for focal weakness and weakness. Negative for dizziness.   Endo/Heme/Allergies: Negative.    Psychiatric/Behavioral: Negative.    All other systems reviewed and are negative.      Physical Exam:  Physical Exam   Constitutional: She is oriented to person, place, and time. She appears well-developed and well-nourished.   HENT:   Head: Normocephalic and atraumatic.   Nose: Nose normal.   Mouth/Throat: Oropharynx is clear and moist.   Eyes: Conjunctivae and EOM are normal. Pupils are equal, round, and reactive to light.   Neck: Normal range of motion. Neck supple. No JVD present. No thyromegaly present.   Cardiovascular: Normal rate, regular rhythm and normal heart sounds.  Exam reveals no gallop and no friction rub.    Pulmonary/Chest: Effort normal and breath sounds normal. No respiratory distress. She has no wheezes. She has no rales.   L sided port   Abdominal: Soft. She exhibits no distension and no mass. There is tenderness. There is no rebound and no guarding.   Musculoskeletal: Normal range of motion. She exhibits edema. She exhibits no tenderness.   Lymphadenopathy:     She has no cervical adenopathy.   Neurological: She is alert and oriented to person, place, and time. No cranial nerve deficit.   Skin: Skin is warm and dry. She is not diaphoretic.   Psychiatric: She has a normal mood and affect. Her behavior is normal.   Nursing note and vitals reviewed.      Labs:        Invalid input(s): [id number]      Recent Labs      02/11/17   0245  02/12/17 0413   SODIUM  131*  136   POTASSIUM  4.4  4.6   CHLORIDE  94*  99   CO2  29  24   BUN  21  46*   CREATININE  4.38*  6.04*   MAGNESIUM  2.2  2.4   PHOSPHORUS  2.9  4.2   CALCIUM  9.4  9.1     Recent Labs      02/11/17   0245  02/12/17 0413   ALTSGPT  14   --    ASTSGOT  13   --    ALKPHOSPHAT  61   --     TBILIRUBIN  0.4   --    GLUCOSE  132*  116*     Recent Labs      17   0245  17   0413  17   0442   RBC  3.44*   --    --    HEMOGLOBIN  10.4*   --    --    HEMATOCRIT  33.1*   --    --    PLATELETCT  186   --    --    PROTHROMBTM  23.7*  34.3*  32.1*   INR  2.04*  3.27*  3.00*     Recent Labs      17   0245   WBC  5.3   NEUTSPOLYS  88.30*   LYMPHOCYTES  7.90*   MONOCYTES  1.90   EOSINOPHILS  0.00   BASOPHILS  0.40   ASTSGOT  13   ALTSGPT  14   ALKPHOSPHAT  61   TBILIRUBIN  0.4           Hemodynamics:  Temp (24hrs), Av.5 °C (97.7 °F), Min:36.3 °C (97.4 °F), Max:36.6 °C (97.9 °F)  Temperature:  (At diaylsis)  Pulse  Av.3  Min: 65  Max: 122  Blood Pressure:  (at diaylsis)     Respiratory:    Respiration: 20, Pulse Oximetry: 94 %        RUL Breath Sounds: Clear, RML Breath Sounds: Clear, RLL Breath Sounds: Clear, LASHANDA Breath Sounds: Clear, LLL Breath Sounds: Clear  Fluids:    Intake/Output Summary (Last 24 hours) at 17 1617  Last data filed at 17 1000   Gross per 24 hour   Intake    500 ml   Output      0 ml   Net    500 ml     Weight: 84.4 kg (186 lb 1.1 oz)  GI/Nutrition:  Orders Placed This Encounter   Procedures   • DIET ORDER     Standing Status: Standing      Number of Occurrences: 1      Standing Expiration Date:      Order Specific Question:  Diet:     Answer:  Renal [8]     Medical Decision Making, by Problem:  Active Hospital Problems    Diagnosis   • Chest pain with pericardial effusion  ? Of pericarditis, high inflammatory markers  D/w Rheum  EKG not abnormal, Trop negative  Echo negative  ? Of uremic pericarditis      Abdominal pain [R10.9], CT without contrast negative, f/u CT chest with portal vein gas  F/u scan benign, surgery evaluated and found not an issue    R pleural fluid with loculation, seems benign, tap not advised  elevated ESR/CRP  ? Uremic vs inflammatory, doubt infection   • UTI (urinary tract infection) [N39.0] on Abx, f/u CX's, entercoccus   Faec., sens to Amp  Stop abx   • Fibromyalgia [M79.7] chr. 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 Disease, as well as severe iron deficiency  Needed transfusion, s/p Iron IV  Plan  D/c abx  decrease steroids  C/w current RX and abx  See orders  Adjusted her pain RX   Need o/p HD set up prior to leaving,   Hope for d/c  Should have a close connect with o/p  to assist    Medications reviewed, Radiology images reviewed and Labs reviewed  Taylor catheter: No Taylor  Central line in place: Need for access    DVT Prophylaxis: Warfarin (Coumadin) and Heparin      Antibiotics: Treating active infection/contamination beyond 24 hours perioperative coverage  Assessed for rehab: Patient returned to prior level of function, rehabilitation not indicated at this time

## 2017-02-14 NOTE — PROGRESS NOTES
Pt dialyzed for 3 hrs today from 1138 to 1440.  Net UF 2.6L.  Pt tolerated tx.  See dialysis flowsheet for details.  Hemostasis achieved within 10 mins of post tx.  Dsgs applied to sites prior to transfer back to her room.  Report called to LILLIANA Hatch RN.

## 2017-02-14 NOTE — CARE PLAN
Problem: Venous Thromboembolism (VTW)/Deep Vein Thrombosis (DVT) Prevention:  Goal: Patient will participate in Venous Thrombosis (VTE)/Deep Vein Thrombosis (DVT)Prevention Measures  Outcome: PROGRESSING AS EXPECTED  Pt receiving coumadin for VTE and ambulates appropriately.    Problem: Pain Management  Goal: Pain level will decrease to patient’s comfort goal  Outcome: PROGRESSING AS EXPECTED  Pt c/o pain of 8 on 0-10 pain scale. Pt medicated appropriately for pain.

## 2017-02-14 NOTE — PALLIATIVE CARE
Palliative Care Update:  This pt is familiar to palliative as she was seen in mid January this year. Discussed previous symptom management recommendations with hospitalist. Palliative MD to round on pt Wednesday 2/15/17.     Thank you for allowing Palliative Care to participate in this patient's care. Please feel free to call [phone] with any questions or concerns.

## 2017-02-15 LAB
ANION GAP SERPL CALC-SCNC: 10 MMOL/L (ref 0–11.9)
BUN SERPL-MCNC: 29 MG/DL (ref 8–22)
CALCIUM SERPL-MCNC: 8.6 MG/DL (ref 8.5–10.5)
CHLORIDE SERPL-SCNC: 99 MMOL/L (ref 96–112)
CO2 SERPL-SCNC: 26 MMOL/L (ref 20–33)
CREAT SERPL-MCNC: 3.51 MG/DL (ref 0.5–1.4)
CRP SERPL HS-MCNC: 0.47 MG/DL (ref 0–0.75)
ERYTHROCYTE [SEDIMENTATION RATE] IN BLOOD BY WESTERGREN METHOD: 35 MM/HOUR (ref 0–20)
GFR SERPL CREATININE-BSD FRML MDRD: 15 ML/MIN/1.73 M 2
GLUCOSE SERPL-MCNC: 158 MG/DL (ref 65–99)
INR PPP: 2.15 (ref 0.87–1.13)
MAGNESIUM SERPL-MCNC: 2 MG/DL (ref 1.5–2.5)
POTASSIUM SERPL-SCNC: 5 MMOL/L (ref 3.6–5.5)
PROTHROMBIN TIME: 24.7 SEC (ref 12–14.6)
SODIUM SERPL-SCNC: 135 MMOL/L (ref 135–145)

## 2017-02-15 PROCEDURE — 86140 C-REACTIVE PROTEIN: CPT

## 2017-02-15 PROCEDURE — 700111 HCHG RX REV CODE 636 W/ 250 OVERRIDE (IP)

## 2017-02-15 PROCEDURE — A9270 NON-COVERED ITEM OR SERVICE: HCPCS | Performed by: HOSPITALIST

## 2017-02-15 PROCEDURE — 700111 HCHG RX REV CODE 636 W/ 250 OVERRIDE (IP): Performed by: HOSPITALIST

## 2017-02-15 PROCEDURE — A9270 NON-COVERED ITEM OR SERVICE: HCPCS

## 2017-02-15 PROCEDURE — 80048 BASIC METABOLIC PNL TOTAL CA: CPT

## 2017-02-15 PROCEDURE — 85610 PROTHROMBIN TIME: CPT

## 2017-02-15 PROCEDURE — 700101 HCHG RX REV CODE 250: Performed by: HOSPITALIST

## 2017-02-15 PROCEDURE — 700111 HCHG RX REV CODE 636 W/ 250 OVERRIDE (IP): Performed by: NURSE PRACTITIONER

## 2017-02-15 PROCEDURE — 85652 RBC SED RATE AUTOMATED: CPT

## 2017-02-15 PROCEDURE — 700102 HCHG RX REV CODE 250 W/ 637 OVERRIDE(OP)

## 2017-02-15 PROCEDURE — 770006 HCHG ROOM/CARE - MED/SURG/GYN SEMI*

## 2017-02-15 PROCEDURE — 700111 HCHG RX REV CODE 636 W/ 250 OVERRIDE (IP): Performed by: INTERNAL MEDICINE

## 2017-02-15 PROCEDURE — A9270 NON-COVERED ITEM OR SERVICE: HCPCS | Performed by: INTERNAL MEDICINE

## 2017-02-15 PROCEDURE — 99232 SBSQ HOSP IP/OBS MODERATE 35: CPT | Performed by: INTERNAL MEDICINE

## 2017-02-15 PROCEDURE — 83735 ASSAY OF MAGNESIUM: CPT

## 2017-02-15 PROCEDURE — 700105 HCHG RX REV CODE 258

## 2017-02-15 PROCEDURE — 90935 HEMODIALYSIS ONE EVALUATION: CPT

## 2017-02-15 PROCEDURE — 700102 HCHG RX REV CODE 250 W/ 637 OVERRIDE(OP): Performed by: HOSPITALIST

## 2017-02-15 PROCEDURE — 700102 HCHG RX REV CODE 250 W/ 637 OVERRIDE(OP): Performed by: INTERNAL MEDICINE

## 2017-02-15 RX ORDER — PREDNISONE 5 MG/1
35 TABLET ORAL DAILY
Status: DISCONTINUED | OUTPATIENT
Start: 2017-02-16 | End: 2017-02-17 | Stop reason: HOSPADM

## 2017-02-15 RX ORDER — HEPARIN SODIUM 1000 [USP'U]/ML
INJECTION, SOLUTION INTRAVENOUS; SUBCUTANEOUS
Status: COMPLETED
Start: 2017-02-15 | End: 2017-02-15

## 2017-02-15 RX ORDER — OMEPRAZOLE 20 MG/1
20 CAPSULE, DELAYED RELEASE ORAL DAILY
Status: DISCONTINUED | OUTPATIENT
Start: 2017-02-15 | End: 2017-02-17 | Stop reason: HOSPADM

## 2017-02-15 RX ORDER — SODIUM CHLORIDE 9 MG/ML
INJECTION, SOLUTION INTRAVENOUS
Status: COMPLETED
Start: 2017-02-15 | End: 2017-02-15

## 2017-02-15 RX ADMIN — PREGABALIN 25 MG: 25 CAPSULE ORAL at 11:47

## 2017-02-15 RX ADMIN — HYDROMORPHONE HYDROCHLORIDE 0.5 MG: 1 INJECTION, SOLUTION INTRAMUSCULAR; INTRAVENOUS; SUBCUTANEOUS at 06:26

## 2017-02-15 RX ADMIN — OXYCODONE HYDROCHLORIDE 20 MG: 10 TABLET ORAL at 13:53

## 2017-02-15 RX ADMIN — SODIUM CHLORIDE 500 ML: 9 INJECTION, SOLUTION INTRAVENOUS at 20:09

## 2017-02-15 RX ADMIN — OMEPRAZOLE 20 MG: 20 CAPSULE, DELAYED RELEASE ORAL at 16:23

## 2017-02-15 RX ADMIN — HEPARIN SODIUM 2000 UNITS: 1000 INJECTION, SOLUTION INTRAVENOUS; SUBCUTANEOUS at 09:35

## 2017-02-15 RX ADMIN — OXYCODONE HYDROCHLORIDE 20 MG: 10 TABLET ORAL at 04:00

## 2017-02-15 RX ADMIN — PREDNISONE 40 MG: 20 TABLET ORAL at 11:47

## 2017-02-15 RX ADMIN — POTASSIUM CHLORIDE 20 MEQ: 1500 TABLET, EXTENDED RELEASE ORAL at 11:47

## 2017-02-15 RX ADMIN — PROMETHAZINE HYDROCHLORIDE 25 MG: 25 TABLET ORAL at 15:41

## 2017-02-15 RX ADMIN — OXYCODONE HYDROCHLORIDE 20 MG: 10 TABLET ORAL at 23:03

## 2017-02-15 RX ADMIN — Medication 667 MG: at 16:23

## 2017-02-15 RX ADMIN — PREGABALIN 50 MG: 25 CAPSULE ORAL at 20:04

## 2017-02-15 RX ADMIN — TIZANIDINE 4 MG: 4 TABLET ORAL at 20:03

## 2017-02-15 RX ADMIN — STANDARDIZED SENNA CONCENTRATE AND DOCUSATE SODIUM 1 TABLET: 8.6; 5 TABLET, FILM COATED ORAL at 20:03

## 2017-02-15 RX ADMIN — LIDOCAINE 1 PATCH: 50 PATCH CUTANEOUS at 20:04

## 2017-02-15 RX ADMIN — ERYTHROPOIETIN 10000 UNITS: 10000 INJECTION, SOLUTION INTRAVENOUS; SUBCUTANEOUS at 15:40

## 2017-02-15 RX ADMIN — TRAZODONE HYDROCHLORIDE 50 MG: 50 TABLET ORAL at 20:04

## 2017-02-15 RX ADMIN — BUPROPION HYDROCHLORIDE 150 MG: 150 TABLET, FILM COATED, EXTENDED RELEASE ORAL at 11:47

## 2017-02-15 RX ADMIN — HYDROXYCHLOROQUINE SULFATE 200 MG: 200 TABLET ORAL at 20:03

## 2017-02-15 RX ADMIN — HYDROMORPHONE HYDROCHLORIDE 0.5 MG: 1 INJECTION, SOLUTION INTRAMUSCULAR; INTRAVENOUS; SUBCUTANEOUS at 15:41

## 2017-02-15 RX ADMIN — HYDROMORPHONE HYDROCHLORIDE 0.5 MG: 1 INJECTION, SOLUTION INTRAMUSCULAR; INTRAVENOUS; SUBCUTANEOUS at 02:23

## 2017-02-15 RX ADMIN — OXYCODONE HYDROCHLORIDE 30 MG: 10 TABLET, FILM COATED, EXTENDED RELEASE ORAL at 20:03

## 2017-02-15 RX ADMIN — WARFARIN SODIUM 5 MG: 5 TABLET ORAL at 17:50

## 2017-02-15 RX ADMIN — OXYCODONE HYDROCHLORIDE 30 MG: 10 TABLET, FILM COATED, EXTENDED RELEASE ORAL at 11:47

## 2017-02-15 ASSESSMENT — ENCOUNTER SYMPTOMS
PSYCHIATRIC NEGATIVE: 1
CHILLS: 0
ORTHOPNEA: 0
MYALGIAS: 1
FLANK PAIN: 1
DIARRHEA: 0
FEVER: 0
NAUSEA: 0
WEIGHT LOSS: 0
SENSORY CHANGE: 0
HEADACHES: 0
HEMOPTYSIS: 0
VOMITING: 0
FOCAL WEAKNESS: 1
COUGH: 0
HEARTBURN: 0
SHORTNESS OF BREATH: 0
BACK PAIN: 1
EYES NEGATIVE: 1
COUGH: 1
WHEEZING: 0
DIZZINESS: 0
PALPITATIONS: 0
ABDOMINAL PAIN: 0
WEAKNESS: 1

## 2017-02-15 ASSESSMENT — PAIN SCALES - GENERAL
PAINLEVEL_OUTOF10: 8
PAINLEVEL_OUTOF10: 8
PAINLEVEL_OUTOF10: 9
PAINLEVEL_OUTOF10: 9
PAINLEVEL_OUTOF10: 10
PAINLEVEL_OUTOF10: 10

## 2017-02-15 NOTE — PROGRESS NOTES
Hospital Medicine Progress Note, Adult, Complex               Author: Alvaro Gaviria Date & Time created: 2/15/2017  3:45 PM     Interval History:  37 y/o female with CPS, lupus nephritis with medical non adherence    ESRD-patient is open to iHD, but it is painful for her to sit on the iHD chair. Still no word on new iHD chair.  Chronic pain-says she is getting relief from her oxycontin ER and that the steroids are helping.  She gets the IV dilaudid around the clock, per nursing staff, patient is constantly 10/10 pain. Appears comfortable on exam. Will reduce IV dilaudid.       Review of Systems:  Review of Systems   Constitutional: Negative for weight loss.   HENT: Negative.    Eyes: Negative.    Respiratory: Positive for cough. Negative for shortness of breath.    Cardiovascular: Positive for chest pain (bit worse with deep breathing).   Gastrointestinal: Negative for nausea, vomiting and abdominal pain.   Genitourinary: Positive for flank pain. Negative for urgency.   Musculoskeletal: Positive for back pain (bit better today) and joint pain.        Generalized, always 10/10   Skin: Negative.  Negative for rash.   Neurological: Positive for focal weakness and weakness (about the same today). Negative for dizziness.   Endo/Heme/Allergies: Negative.    Psychiatric/Behavioral: Negative.    All other systems reviewed and are negative.      Physical Exam:  Physical Exam   Constitutional: She is oriented to person, place, and time. She appears well-developed and well-nourished.   HENT:   Head: Normocephalic and atraumatic.   Nose: Nose normal.   Mouth/Throat: Oropharynx is clear and moist.   Eyes: Conjunctivae and EOM are normal. Pupils are equal, round, and reactive to light. No scleral icterus.   Neck: Normal range of motion. Neck supple.   Cardiovascular: Normal rate, regular rhythm and normal heart sounds.  Exam reveals no gallop and no friction rub.    Pulmonary/Chest: Effort normal and breath sounds normal. No  stridor. She has no wheezes.   L sided port, access site appears ok at this time   Abdominal: Soft. She exhibits no distension. There is tenderness.   Musculoskeletal: Normal range of motion. She exhibits tenderness. She exhibits no edema.   Neurological: She is alert and oriented to person, place, and time. No cranial nerve deficit.   Skin: Skin is warm and dry. She is not diaphoretic.   Psychiatric: She has a normal mood and affect. Her behavior is normal.   Slightly depressed   Nursing note and vitals reviewed.      Labs:        Invalid input(s): [id number]      No results for input(s): SODIUM, POTASSIUM, CHLORIDE, CO2, BUN, CREATININE, MAGNESIUM, PHOSPHORUS, CALCIUM in the last 72 hours.  No results for input(s): ALTSGPT, ASTSGOT, ALKPHOSPHAT, TBILIRUBIN, DBILIRUBIN, GAMMAGT, AMYLASE, LIPASE, ALB, PREALBUMIN, GLUCOSE in the last 72 hours.  Recent Labs      17   0442  17   0331   RBC   --   3.19*   HEMOGLOBIN   --   9.8*   HEMATOCRIT   --   31.6*   PLATELETCT   --   175   PROTHROMBTM  32.1*  30.5*   INR  3.00*  2.82*     Recent Labs      17   0331   WBC  15.0*           Hemodynamics:  Temp (24hrs), Av.8 °C (98.2 °F), Min:36.4 °C (97.6 °F), Max:37.3 °C (99.1 °F)  Temperature: 36.7 °C (98.1 °F)  Pulse  Av.4  Min: 63  Max: 122  Blood Pressure: 128/60 mmHg     Respiratory:    Respiration: 18, Pulse Oximetry: 98 %     Work Of Breathing / Effort: Mild  RLL Breath Sounds: Diminished  Fluids:  No intake or output data in the 24 hours ending 02/15/17 1545  Weight: 85.1 kg (187 lb 9.8 oz)  GI/Nutrition:  Orders Placed This Encounter   Procedures   • DIET ORDER     Standing Status: Standing      Number of Occurrences: 1      Standing Expiration Date:      Order Specific Question:  Diet:     Answer:  Renal [8]     Medical Decision Making, by Problem:  Active Hospital Problems    Diagnosis   • Chest pain with pericardial effusion  ? Of pericarditis, high inflammatory markers  D/w Rheum  EKG not  abnormal, Trop negative  Echo negative  -will repeat inflammatory markers tomorrow AM  ? Of uremic pericarditis  -clinically monitor at this time    Abdominal pain [R10.9], CT without contrast negative, f/u CT chest with portal vein gas  F/u scan benign, surgery evaluated and found not an issue, same today    R pleural fluid with loculation, seems benign, tap not advised  elevated ESR/CRP, will repeat as noted above  ? Uremic vs inflammatory, doubt infection, monitor clinically at this time   • UTI (urinary tract infection) [N39.0] on Abx, f/u CX's, entercoccus  Faec., sens to Amp  Stop abx, continue to hold at this time   • Fibromyalgia [M79.7] chr. 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 Disease, as well as severe iron deficiency  Needed transfusion, s/p Iron IV    Chronic pain-continue oxycodone CR, taper IV dilaudid to Q6 hours PRN, continue slow taper    Lupus-possible mild flare, will taper steroids soon given she already has avascular necrosis of b/l hips, will start tomorrow  -add PPI while on high dose steroids    Medications reviewed, Radiology images reviewed and Labs reviewed  Taylor catheter: No Taylor  Central line in place: Need for access    DVT Prophylaxis: Warfarin (Coumadin)      Antibiotics: Treating active infection/contamination beyond 24 hours perioperative coverage  Assessed for  rehab: Patient returned to prior level of function, rehabilitation not indicated at this time

## 2017-02-15 NOTE — PROGRESS NOTES
Patient aox4. VS. Pain meds given- Patient request pain meds on the hour. Rates pain 9/10. RA. Rested well throughout shift. Call light within reach. Plan of care ongoing.

## 2017-02-15 NOTE — PROGRESS NOTES
Assumed care at 0700 . Report received from night  RN. Patient's chart and MAR reviewed. Assessment complete. Pt is up in bathroom. Patient was updated on plan of care. Questions answered and concerns addressed. Reports pain of 10 on a 0-10 scale. Pt denies any additional needs at this time. White board updated. Call light, hospital room phone and personal belongings within reach.

## 2017-02-15 NOTE — PROGRESS NOTES
Hospital Medicine Progress Note, Adult, Complex               Author: Alvarorosie Gaviria Date & Time created: 2/14/2017  5:22 PM     Interval History:  37 y/o female with CPS, lupus nephritis with medical non adherence    ESRD-patient is open to iHD, but it is painful for her to sit on the iHD chair.  Chronic pain-says she is getting relief from her oxycontin ER and that the steroids are helping.       Review of Systems:  Review of Systems   Constitutional: Negative for fever, chills and malaise/fatigue.   HENT: Negative.    Eyes: Negative.    Respiratory: Positive for cough. Negative for shortness of breath.    Cardiovascular: Negative.    Gastrointestinal: Positive for abdominal pain. Negative for nausea and vomiting.   Genitourinary: Positive for flank pain. Negative for urgency.   Musculoskeletal: Positive for back pain (bit better today) and joint pain.   Skin: Negative.  Negative for rash.   Neurological: Positive for focal weakness and weakness. Negative for dizziness.   Endo/Heme/Allergies: Negative.    Psychiatric/Behavioral: Negative.    All other systems reviewed and are negative.      Physical Exam:  Physical Exam   Constitutional: She is oriented to person, place, and time. She appears well-developed and well-nourished.   HENT:   Head: Normocephalic and atraumatic.   Nose: Nose normal.   Mouth/Throat: Oropharynx is clear and moist.   Eyes: Conjunctivae and EOM are normal. Pupils are equal, round, and reactive to light. Right eye exhibits no discharge. Left eye exhibits no discharge.   Neck: Normal range of motion. Neck supple.   Cardiovascular: Normal rate, regular rhythm and normal heart sounds.  Exam reveals no gallop and no friction rub.    Pulmonary/Chest: Effort normal and breath sounds normal. No stridor. No respiratory distress.   L sided port, access site appears ok at this time   Abdominal: Soft. She exhibits no distension. There is tenderness.   Musculoskeletal: Normal range of motion. She  exhibits no edema or tenderness.   Neurological: She is alert and oriented to person, place, and time. No cranial nerve deficit.   Skin: Skin is warm and dry. She is not diaphoretic.   Psychiatric: She has a normal mood and affect. Her behavior is normal.   Slightly depressed   Nursing note and vitals reviewed.      Labs:        Invalid input(s): [id number]      Recent Labs      17   0413   SODIUM  136   POTASSIUM  4.6   CHLORIDE  99   CO2  24   BUN  46*   CREATININE  6.04*   MAGNESIUM  2.4   PHOSPHORUS  4.2   CALCIUM  9.1     Recent Labs      17   0413   GLUCOSE  116*     Recent Labs      17   0413  17   0442  17   0331   RBC   --    --   3.19*   HEMOGLOBIN   --    --   9.8*   HEMATOCRIT   --    --   31.6*   PLATELETCT   --    --   175   PROTHROMBTM  34.3*  32.1*  30.5*   INR  3.27*  3.00*  2.82*     Recent Labs      17   033   WBC  15.0*           Hemodynamics:  Temp (24hrs), Av.7 °C (98 °F), Min:36.3 °C (97.4 °F), Max:37.3 °C (99.1 °F)  Temperature: 37.3 °C (99.1 °F)  Pulse  Av.2  Min: 63  Max: 122  Blood Pressure: 113/66 mmHg     Respiratory:    Respiration: 18, Pulse Oximetry: 94 %     Work Of Breathing / Effort: Mild  RUL Breath Sounds: Clear, RML Breath Sounds: Clear, RLL Breath Sounds: Clear, LASHANDA Breath Sounds: Clear, LLL Breath Sounds: Clear  Fluids:  No intake or output data in the 24 hours ending 17 1722  Weight: 82.9 kg (182 lb 12.2 oz)  GI/Nutrition:  Orders Placed This Encounter   Procedures   • DIET ORDER     Standing Status: Standing      Number of Occurrences: 1      Standing Expiration Date:      Order Specific Question:  Diet:     Answer:  Renal [8]     Medical Decision Making, by Problem:  Active Hospital Problems    Diagnosis   • Chest pain with pericardial effusion  ? Of pericarditis, high inflammatory markers  D/w Rheum  EKG not abnormal, Trop negative  Echo negative  ? Of uremic pericarditis  -clinically monitor at this time    Abdominal  pain [R10.9], CT without contrast negative, f/u CT chest with portal vein gas  F/u scan benign, surgery evaluated and found not an issue, about the same today    R pleural fluid with loculation, seems benign, tap not advised  elevated ESR/CRP  ? Uremic vs inflammatory, doubt infection, monitor clinically at this time   • UTI (urinary tract infection) [N39.0] on Abx, f/u CX's, entercoccus  Faec., sens to Amp  Stop abx, continue to hold at this time   • Fibromyalgia [M79.7] chr. 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 Disease, as well as severe iron deficiency  Needed transfusion, s/p Iron IV    Chronic pain-continue oxycodone CR    Lupus-possible mild flare, will taper steroids soon given she already has avascular necrosis of b/l hips    Medications reviewed, Radiology images reviewed and Labs reviewed  Taylor catheter: No Taylor  Central line in place: Need for access    DVT Prophylaxis: Warfarin (Coumadin)      Antibiotics: Treating active infection/contamination beyond 24 hours perioperative coverage  Assessed for rehab: Patient returned to prior level of function, rehabilitation not indicated at this time

## 2017-02-15 NOTE — PROGRESS NOTES
Nephrology Progress Note, Adult, Complex               Author: Leana Sivakumar Date & Time created: 2/14/2017  4:04 PM     Interval History:  36 year old with ESRD, SLE, who stopped coming to dialysis and subsequently came in with uremia.   Complements normal, anti-ds dna detected  Awaiting acceptance to HD unit.  No acute events, no new complaints  Review of Systems:  Review of Systems   Constitutional: Positive for malaise/fatigue. Negative for fever and chills.   Eyes: Negative.    Respiratory: Negative for cough, hemoptysis, shortness of breath and wheezing.    Cardiovascular: Negative for chest pain, palpitations, orthopnea and leg swelling.   Gastrointestinal: Negative for heartburn, nausea, vomiting, abdominal pain and diarrhea.   Genitourinary: Negative for dysuria.   Musculoskeletal: Positive for myalgias, back pain and joint pain.   Skin: Negative for itching and rash.   Neurological: Positive for focal weakness. Negative for dizziness, sensory change and headaches.       Physical Exam:  Physical Exam   Constitutional: She is oriented to person, place, and time. She appears well-developed. No distress.   HENT:   Head: Normocephalic and atraumatic.   Mouth/Throat: Oropharynx is clear and moist.   Eyes: Conjunctivae and EOM are normal. Pupils are equal, round, and reactive to light. No scleral icterus.   Neck: Normal range of motion. Neck supple. No thyromegaly present.   Cardiovascular: Normal rate and regular rhythm.    Pulmonary/Chest: Effort normal and breath sounds normal.   Abdominal: Soft. Bowel sounds are normal. She exhibits no distension and no mass. There is no tenderness.   Musculoskeletal: She exhibits no edema.   Lymphadenopathy:     She has no cervical adenopathy.   Neurological: She is alert and oriented to person, place, and time. No cranial nerve deficit.   Skin: Skin is warm and dry.       Labs:        Invalid input(s): [id number]      Recent Labs      02/12/17   0413   SODIUM  136    POTASSIUM  4.6   CHLORIDE  99   CO2  24   BUN  46*   CREATININE  6.04*   MAGNESIUM  2.4   PHOSPHORUS  4.2   CALCIUM  9.1     Recent Labs      17   0413   GLUCOSE  116*     Recent Labs      17   0413  17   0442  17   033   RBC   --    --   3.19*   HEMOGLOBIN   --    --   9.8*   HEMATOCRIT   --    --   31.6*   PLATELETCT   --    --   175   PROTHROMBTM  34.3*  32.1*  30.5*   INR  3.27*  3.00*  2.82*     Recent Labs      17   WBC  15.0*           Hemodynamics:  Temp (24hrs), Av.6 °C (97.8 °F), Min:36.3 °C (97.4 °F), Max:36.7 °C (98.1 °F)  Temperature: 36.3 °C (97.4 °F)  Pulse  Av.4  Min: 63  Max: 122  Blood Pressure: 117/82 mmHg     Respiratory:    Respiration: 18, Pulse Oximetry: 95 %     Work Of Breathing / Effort: Mild  RUL Breath Sounds: Clear, RML Breath Sounds: Clear, RLL Breath Sounds: Clear, LASHANDA Breath Sounds: Clear, LLL Breath Sounds: Clear  Fluids:  No intake or output data in the 24 hours ending 17 1604  Weight: 82.9 kg (182 lb 12.2 oz)  GI/Nutrition:  Orders Placed This Encounter   Procedures   • DIET ORDER     Standing Status: Standing      Number of Occurrences: 1      Standing Expiration Date:      Order Specific Question:  Diet:     Answer:  Renal [8]     Medical Decision Making, by Problem:  Active Hospital Problems    Diagnosis   • Fibromyalgia [M79.7]   • Hypokalemia [E87.6]   • Metabolic acidosis [E87.2]   • Hypertension [I10]   • ESRD on dialysis (CMS-[facility]) [N18.6, Z99.2]   • History of lupus nephritis [Z87.448]     1. ESRD - noncompliant with treatment, seen and examined during HD - please see dialysis flow sheet for details  2. Anemia - Epogen with dialysis, Hgb at goal  3. Pericardial effusion - History of SLE, was also uremic due to noncompliance with HD  4. SLE - Complements normal, anti-dsDNA detected    Recs: needs outpt dialysis chair -checking [facility]             Rheumatology f/u  Labs reviewed and Medications  reviewed

## 2017-02-15 NOTE — CARE PLAN
Problem: Safety  Goal: Will remain free from injury  Outcome: PROGRESSING AS EXPECTED  Patient remains free from accidental injury. Fall precautions in place. Educated on fall risk    Problem: Infection  Goal: Will remain free from infection  Outcome: PROGRESSING AS EXPECTED  Standard precautions in place. Clean hands safe hands. Patient and family educated on clean hands safe hands.

## 2017-02-15 NOTE — PROGRESS NOTES
Nephrology Progress Note, Adult, Complex               Author: Leana Sivakumar Date & Time created: 2/15/2017  11:11 AM     Interval History:  36 year old with ESRD, SLE, who stopped coming to dialysis and subsequently came in with uremia.   Complements normal, anti-ds dna detected  Awaiting acceptance to HD unit.  No acute events, no new complaints  Seen and examined during dialysis -tolerates well  Review of Systems:  Review of Systems   Constitutional: Positive for malaise/fatigue. Negative for fever and chills.   Eyes: Negative.    Respiratory: Negative for cough, hemoptysis, shortness of breath and wheezing.    Cardiovascular: Negative for chest pain, palpitations, orthopnea and leg swelling.   Gastrointestinal: Negative for heartburn, nausea, vomiting, abdominal pain and diarrhea.   Genitourinary: Negative for dysuria.   Musculoskeletal: Positive for myalgias, back pain and joint pain.   Skin: Negative for itching and rash.   Neurological: Positive for focal weakness. Negative for dizziness, sensory change and headaches.       Physical Exam:  Physical Exam   Constitutional: She is oriented to person, place, and time. She appears well-developed. No distress.   HENT:   Head: Normocephalic and atraumatic.   Mouth/Throat: Oropharynx is clear and moist.   Eyes: Conjunctivae and EOM are normal. Pupils are equal, round, and reactive to light. No scleral icterus.   Neck: Normal range of motion. Neck supple. No thyromegaly present.   Cardiovascular: Normal rate and regular rhythm.    Pulmonary/Chest: Effort normal and breath sounds normal.   Abdominal: Soft. Bowel sounds are normal. She exhibits no distension and no mass. There is no tenderness.   Musculoskeletal: She exhibits no edema.   Lymphadenopathy:     She has no cervical adenopathy.   Neurological: She is alert and oriented to person, place, and time. No cranial nerve deficit.   Skin: Skin is warm and dry.       Labs:        Invalid input(s): [id number]      No  results for input(s): SODIUM, POTASSIUM, CHLORIDE, CO2, BUN, CREATININE, MAGNESIUM, PHOSPHORUS, CALCIUM in the last 72 hours.  No results for input(s): ALTSGPT, ASTSGOT, ALKPHOSPHAT, TBILIRUBIN, DBILIRUBIN, GAMMAGT, AMYLASE, LIPASE, ALB, PREALBUMIN, GLUCOSE in the last 72 hours.  Recent Labs      17   0442  17   0331   RBC   --   3.19*   HEMOGLOBIN   --   9.8*   HEMATOCRIT   --   31.6*   PLATELETCT   --   175   PROTHROMBTM  32.1*  30.5*   INR  3.00*  2.82*     Recent Labs      17   0331   WBC  15.0*           Hemodynamics:  Temp (24hrs), Av.7 °C (98 °F), Min:36.3 °C (97.4 °F), Max:37.3 °C (99.1 °F)  Temperature: 36.7 °C (98.1 °F)  Pulse  Av.4  Min: 63  Max: 122  Blood Pressure: 128/60 mmHg     Respiratory:    Respiration: 18, Pulse Oximetry: 98 %     Work Of Breathing / Effort: Mild     Fluids:  No intake or output data in the 24 hours ending 02/15/17 1111  Weight: 85.1 kg (187 lb 9.8 oz)  GI/Nutrition:  Orders Placed This Encounter   Procedures   • DIET ORDER     Standing Status: Standing      Number of Occurrences: 1      Standing Expiration Date:      Order Specific Question:  Diet:     Answer:  Renal [8]     Medical Decision Making, by Problem:  Active Hospital Problems    Diagnosis   • Fibromyalgia [M79.7]   • Hypokalemia [E87.6]   • Metabolic acidosis [E87.2]   • Hypertension [I10]   • ESRD on dialysis (CMS-[facility]) [N18.6, Z99.2]   • History of lupus nephritis [Z87.448]     1. ESRD?HD - seen and examined during HD - please see dialysis flow sheet for details  2. Anemia - Epogen with dialysis  3. Pericardial effusion - History of SLE, was also uremic due to noncompliance with HD  4. SLE - Complements normal, anti-dsDNA detected  5. HTN: BP well controlled  Recs: needs outpt dialysis chair -[place] dialysis units            Diet: renal             Rheumatology f/u  Labs reviewed and Medications reviewed

## 2017-02-16 LAB
ANION GAP SERPL CALC-SCNC: 9 MMOL/L (ref 0–11.9)
BUN SERPL-MCNC: 48 MG/DL (ref 8–22)
CALCIUM SERPL-MCNC: 8.7 MG/DL (ref 8.5–10.5)
CHLORIDE SERPL-SCNC: 101 MMOL/L (ref 96–112)
CO2 SERPL-SCNC: 25 MMOL/L (ref 20–33)
CREAT SERPL-MCNC: 4.6 MG/DL (ref 0.5–1.4)
ERYTHROCYTE [DISTWIDTH] IN BLOOD BY AUTOMATED COUNT: 52.2 FL (ref 35.9–50)
GFR SERPL CREATININE-BSD FRML MDRD: 11 ML/MIN/1.73 M 2
GLUCOSE SERPL-MCNC: 81 MG/DL (ref 65–99)
HCT VFR BLD AUTO: 33.5 % (ref 37–47)
HGB BLD-MCNC: 10.2 G/DL (ref 12–16)
MCH RBC QN AUTO: 30.7 PG (ref 27–33)
MCHC RBC AUTO-ENTMCNC: 30.4 G/DL (ref 33.6–35)
MCV RBC AUTO: 100.9 FL (ref 81.4–97.8)
PLATELET # BLD AUTO: 156 K/UL (ref 164–446)
PMV BLD AUTO: 10.9 FL (ref 9–12.9)
POTASSIUM SERPL-SCNC: 5.1 MMOL/L (ref 3.6–5.5)
RBC # BLD AUTO: 3.32 M/UL (ref 4.2–5.4)
SODIUM SERPL-SCNC: 135 MMOL/L (ref 135–145)
WBC # BLD AUTO: 13.1 K/UL (ref 4.8–10.8)

## 2017-02-16 PROCEDURE — A9270 NON-COVERED ITEM OR SERVICE: HCPCS | Performed by: HOSPITALIST

## 2017-02-16 PROCEDURE — 80048 BASIC METABOLIC PNL TOTAL CA: CPT

## 2017-02-16 PROCEDURE — A9270 NON-COVERED ITEM OR SERVICE: HCPCS

## 2017-02-16 PROCEDURE — 700102 HCHG RX REV CODE 250 W/ 637 OVERRIDE(OP): Performed by: INTERNAL MEDICINE

## 2017-02-16 PROCEDURE — 85027 COMPLETE CBC AUTOMATED: CPT

## 2017-02-16 PROCEDURE — 770006 HCHG ROOM/CARE - MED/SURG/GYN SEMI*

## 2017-02-16 PROCEDURE — 302151 K-PAD 14X20: Performed by: INTERNAL MEDICINE

## 2017-02-16 PROCEDURE — 700102 HCHG RX REV CODE 250 W/ 637 OVERRIDE(OP): Performed by: HOSPITALIST

## 2017-02-16 PROCEDURE — 700111 HCHG RX REV CODE 636 W/ 250 OVERRIDE (IP): Performed by: INTERNAL MEDICINE

## 2017-02-16 PROCEDURE — A9270 NON-COVERED ITEM OR SERVICE: HCPCS | Performed by: INTERNAL MEDICINE

## 2017-02-16 PROCEDURE — 700101 HCHG RX REV CODE 250: Performed by: HOSPITALIST

## 2017-02-16 PROCEDURE — 302131 K PAD MOTOR: Performed by: INTERNAL MEDICINE

## 2017-02-16 PROCEDURE — 700105 HCHG RX REV CODE 258

## 2017-02-16 PROCEDURE — 99232 SBSQ HOSP IP/OBS MODERATE 35: CPT | Performed by: INTERNAL MEDICINE

## 2017-02-16 PROCEDURE — 700102 HCHG RX REV CODE 250 W/ 637 OVERRIDE(OP)

## 2017-02-16 RX ORDER — SODIUM CHLORIDE 9 MG/ML
INJECTION, SOLUTION INTRAVENOUS
Status: COMPLETED
Start: 2017-02-16 | End: 2017-02-16

## 2017-02-16 RX ADMIN — BUPROPION HYDROCHLORIDE 150 MG: 150 TABLET, FILM COATED, EXTENDED RELEASE ORAL at 07:23

## 2017-02-16 RX ADMIN — OXYCODONE HYDROCHLORIDE 20 MG: 10 TABLET ORAL at 07:24

## 2017-02-16 RX ADMIN — TIZANIDINE 4 MG: 4 TABLET ORAL at 20:38

## 2017-02-16 RX ADMIN — OXYCODONE HYDROCHLORIDE 20 MG: 10 TABLET ORAL at 03:17

## 2017-02-16 RX ADMIN — PREDNISONE 35 MG: 5 TABLET ORAL at 07:23

## 2017-02-16 RX ADMIN — OXYCODONE HYDROCHLORIDE 20 MG: 10 TABLET ORAL at 23:47

## 2017-02-16 RX ADMIN — HYDROXYCHLOROQUINE SULFATE 200 MG: 200 TABLET ORAL at 20:38

## 2017-02-16 RX ADMIN — Medication 667 MG: at 07:23

## 2017-02-16 RX ADMIN — HYDROMORPHONE HYDROCHLORIDE 0.5 MG: 1 INJECTION, SOLUTION INTRAMUSCULAR; INTRAVENOUS; SUBCUTANEOUS at 02:09

## 2017-02-16 RX ADMIN — POTASSIUM CHLORIDE 20 MEQ: 1500 TABLET, EXTENDED RELEASE ORAL at 07:23

## 2017-02-16 RX ADMIN — PREGABALIN 50 MG: 25 CAPSULE ORAL at 20:38

## 2017-02-16 RX ADMIN — PREGABALIN 25 MG: 25 CAPSULE ORAL at 07:24

## 2017-02-16 RX ADMIN — OMEPRAZOLE 20 MG: 20 CAPSULE, DELAYED RELEASE ORAL at 07:23

## 2017-02-16 RX ADMIN — OXYCODONE HYDROCHLORIDE 30 MG: 10 TABLET, FILM COATED, EXTENDED RELEASE ORAL at 20:37

## 2017-02-16 RX ADMIN — STANDARDIZED SENNA CONCENTRATE AND DOCUSATE SODIUM 1 TABLET: 8.6; 5 TABLET, FILM COATED ORAL at 20:37

## 2017-02-16 RX ADMIN — Medication 667 MG: at 11:33

## 2017-02-16 RX ADMIN — HYDROMORPHONE HYDROCHLORIDE 0.5 MG: 1 INJECTION, SOLUTION INTRAMUSCULAR; INTRAVENOUS; SUBCUTANEOUS at 18:10

## 2017-02-16 RX ADMIN — Medication 667 MG: at 18:10

## 2017-02-16 RX ADMIN — HYDROMORPHONE HYDROCHLORIDE 0.5 MG: 1 INJECTION, SOLUTION INTRAMUSCULAR; INTRAVENOUS; SUBCUTANEOUS at 00:05

## 2017-02-16 RX ADMIN — WARFARIN SODIUM 5 MG: 5 TABLET ORAL at 19:10

## 2017-02-16 RX ADMIN — PROMETHAZINE HYDROCHLORIDE 25 MG: 25 TABLET ORAL at 11:34

## 2017-02-16 RX ADMIN — SODIUM CHLORIDE 1000 ML: 9 INJECTION, SOLUTION INTRAVENOUS at 19:32

## 2017-02-16 RX ADMIN — TRAZODONE HYDROCHLORIDE 50 MG: 50 TABLET ORAL at 20:38

## 2017-02-16 RX ADMIN — OXYCODONE HYDROCHLORIDE 20 MG: 10 TABLET ORAL at 11:34

## 2017-02-16 RX ADMIN — OXYCODONE HYDROCHLORIDE 20 MG: 10 TABLET ORAL at 15:33

## 2017-02-16 RX ADMIN — LIDOCAINE 1 PATCH: 50 PATCH CUTANEOUS at 20:38

## 2017-02-16 RX ADMIN — OXYCODONE HYDROCHLORIDE 20 MG: 10 TABLET ORAL at 19:32

## 2017-02-16 RX ADMIN — OXYCODONE HYDROCHLORIDE 30 MG: 10 TABLET, FILM COATED, EXTENDED RELEASE ORAL at 07:23

## 2017-02-16 RX ADMIN — HYDROMORPHONE HYDROCHLORIDE 0.5 MG: 1 INJECTION, SOLUTION INTRAMUSCULAR; INTRAVENOUS; SUBCUTANEOUS at 10:14

## 2017-02-16 ASSESSMENT — ENCOUNTER SYMPTOMS
PALPITATIONS: 0
DIARRHEA: 0
PSYCHIATRIC NEGATIVE: 1
HEARTBURN: 0
DIZZINESS: 0
ABDOMINAL PAIN: 0
FOCAL WEAKNESS: 1
CHILLS: 0
BACK PAIN: 1
SHORTNESS OF BREATH: 0
ORTHOPNEA: 0
VOMITING: 0
COUGH: 0
FEVER: 0
HEADACHES: 0
HEMOPTYSIS: 0
FLANK PAIN: 1
NAUSEA: 0
SENSORY CHANGE: 0
WHEEZING: 0
EYES NEGATIVE: 1
WEAKNESS: 1
MYALGIAS: 1
COUGH: 1
WEIGHT LOSS: 0

## 2017-02-16 ASSESSMENT — PAIN SCALES - GENERAL
PAINLEVEL_OUTOF10: 8
PAINLEVEL_OUTOF10: 8
PAINLEVEL_OUTOF10: 7
PAINLEVEL_OUTOF10: 8
PAINLEVEL_OUTOF10: 8
PAINLEVEL_OUTOF10: 7
PAINLEVEL_OUTOF10: 9
PAINLEVEL_OUTOF10: 7
PAINLEVEL_OUTOF10: 7

## 2017-02-16 NOTE — PROGRESS NOTES
Hospital Medicine Progress Note, Adult, Complex               Author: Alvaro Gaviria Date & Time created: 2/16/2017  3:41 PM     Interval History:  37 y/o female with CPS, lupus nephritis with medical non adherence    ESRD-patient is now tolerating iHD. She now has an iHD chair as well.  Chronic pain-pain is a littler better today.  SLE-she is concerned that her Lupus is acting up. Still hurts to take deep breaths. Anti-DSDNA detected, complements normal.    Review of Systems:  Review of Systems   Constitutional: Negative for weight loss.   HENT: Negative.    Eyes: Negative.    Respiratory: Positive for cough. Negative for shortness of breath.    Cardiovascular: Positive for chest pain (bit worse with deep breathing, no change today).   Gastrointestinal: Negative for abdominal pain.   Genitourinary: Positive for flank pain. Negative for urgency.   Musculoskeletal: Positive for back pain (bit better today) and joint pain.        Generalized, about 7/10 today   Skin: Negative.  Negative for rash.   Neurological: Positive for focal weakness and weakness (improving). Negative for dizziness.   Endo/Heme/Allergies: Negative.    Psychiatric/Behavioral: Negative.    All other systems reviewed and are negative.      Physical Exam:  Physical Exam   Constitutional: She is oriented to person, place, and time. She appears well-developed and well-nourished.   HENT:   Head: Normocephalic and atraumatic.   Nose: Nose normal.   Mouth/Throat: Oropharynx is clear and moist.   Eyes: Conjunctivae and EOM are normal. Pupils are equal, round, and reactive to light. Right eye exhibits no discharge. Left eye exhibits no discharge.   Neck: Normal range of motion. Neck supple.   Cardiovascular: Normal rate, regular rhythm and normal heart sounds.  Exam reveals no gallop and no friction rub.    Pulmonary/Chest: Effort normal and breath sounds normal. No stridor. No respiratory distress.   L sided port, access site appears ok at this time,  unchanged today   Abdominal: Soft. She exhibits no distension. There is no tenderness.   Musculoskeletal: Normal range of motion. She exhibits tenderness. She exhibits no edema.   Neurological: She is alert and oriented to person, place, and time. No cranial nerve deficit.   Skin: Skin is warm and dry. She is not diaphoretic.   Psychiatric: She has a normal mood and affect. Her behavior is normal.   Slightly depressed   Nursing note and vitals reviewed.      Labs:        Invalid input(s): [id number]      Recent Labs      02/15/17   1544  17   0530   SODIUM  135  135   POTASSIUM  5.0  5.1   CHLORIDE  99  101   CO2  26  25   BUN  29*  48*   CREATININE  3.51*  4.60*   MAGNESIUM  2.0   --    CALCIUM  8.6  8.7     Recent Labs      02/15/17   1544  17   0530   GLUCOSE  158*  81     Recent Labs      17   0331  02/15/17   1544  17   0530   RBC  3.19*   --   3.32*   HEMOGLOBIN  9.8*   --   10.2*   HEMATOCRIT  31.6*   --   33.5*   PLATELETCT  175   --   156*   PROTHROMBTM  30.5*  24.7*   --    INR  2.82*  2.15*   --      Recent Labs      17   03317   0530   WBC  15.0*  13.1*           Hemodynamics:  Temp (24hrs), Av °C (98.6 °F), Min:36.7 °C (98 °F), Max:37.2 °C (99 °F)  Temperature: 36.9 °C (98.5 °F)  Pulse  Av.7  Min: 63  Max: 122  Blood Pressure: 123/75 mmHg     Respiratory:    Respiration: 16, Pulse Oximetry: 96 %     Work Of Breathing / Effort: Mild  RUL Breath Sounds: Clear, RML Breath Sounds: Clear, RLL Breath Sounds: Clear, LASHANDA Breath Sounds: Clear, LLL Breath Sounds: Clear  Fluids:    Intake/Output Summary (Last 24 hours) at 17 1541  Last data filed at 17 0700   Gross per 24 hour   Intake    350 ml   Output      0 ml   Net    350 ml     Weight: 82.6 kg (182 lb 1.6 oz)  GI/Nutrition:  Orders Placed This Encounter   Procedures   • DIET ORDER     Standing Status: Standing      Number of Occurrences: 1      Standing Expiration Date:      Order Specific  Question:  Diet:     Answer:  Renal [8]     Medical Decision Making, by Problem:  Active Hospital Problems    Diagnosis   • Chest pain with pericardial effusion-no apparent change  ? Of pericarditis, high inflammatory markers  D/w Rheum  EKG not abnormal, Trop negative  Echo negative  -will repeat inflammatory markers tomorrow AM, they are both down-trending, indicating response to steroids  ? Of uremic pericarditis, more likely lupus related  -clinically monitor at this time    Abdominal pain [R10.9], CT without contrast negative, f/u CT chest with portal vein gas  F/u scan benign, surgery evaluated and found not an issue, same today    R pleural fluid with loculation, seems benign, tap not advised  -ESR/CRP are down-trending as noted above  ? Uremic vs inflammatory, doubt infection, monitor clinically at this time   • UTI (urinary tract infection) [N39.0] on Abx, f/u CX's, entercoccus  Faec., sens to Amp  Stop abx, continue to hold at this time   • Fibromyalgia [M79.7] chr. 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] with coumadin use, INR titrated   • Hemorrhagic disorder due to extrinsic circulating anticoagulants (CMS-HCC) [D68.32]  -coumadin is at therapeutic dosing currently, no changes for today again   • Hypokalemia [E87.6] follow   • Metabolic acidosis [E87.2] improved with RRT   • Hypertension [I10] follow, RX   • ESRD on dialysis (CMS-HCC) [N18.6, Z99.2] seen by nephro, has a confirmed iHD chair now   • History of lupus nephritis [Z87.448] leading to RRT   • Medical non-compliance [Z91.19] , repeat episode, Pt with difficult social status and combination of issue's, needs a chronic pain doctor   - anemia of chr. Disease, as well as severe iron deficiency  Needed transfusion, s/p Iron IV    Chronic pain-continue oxycodone CR, taper IV dilaudid to Q6 hours PRN, continue slow taper    Lupus-possible mild flare, will taper steroids soon given she already has avascular necrosis of b/l hips, no taper  today  -continue PPI while on high dose steroids    Likely D/C home tomorrow    Medications reviewed, Radiology images reviewed and Labs reviewed  Taylor catheter: No Taylor  Central line in place: Need for access    DVT Prophylaxis: Warfarin (Coumadin)      Antibiotics: Treating active infection/contamination beyond 24 hours perioperative coverage  Assessed for rehab: Patient returned to prior level of function, rehabilitation not indicated at this time

## 2017-02-16 NOTE — PROGRESS NOTES
Received pt sitting up in bed watching TV. VSS, A&Ox4, Pt asking for pain medications, Scheduled medications given. Pain control seems to be the Pt's biggest concern at this time. Gave Pt the times she can have her prn pain meds and will follow up with her on her pain control.

## 2017-02-16 NOTE — PROGRESS NOTES
Inpatient Anticoagulation Service Note    Date: 2/16/2017  Reason for Anticoagulation: Deep Vein Thrombosis, Other (Comments) (Hx SLE)     Hemoglobin Value: 10.2  Hematocrit Value: 33.5  Lab Platelet Value: 156  Target INR: 2.0 to 3.0    INR from last 7 days     Date/Time INR Value    02/15/17 1544 (!)2.15    02/14/17 0331 (!)2.82    02/13/17 0442 (!)3    02/12/17 0413 (!)3.27    02/11/17 0245 (!)2.04        Dose from last 7 days     Date/Time Dose (mg)    02/16/17 1500 5    02/15/17 1100 5    02/14/17 1000 5    02/13/17 1000 5    02/12/17 1300 0    02/11/17 1200 5    02/10/17 1500 7.5        Average Dose (mg):  (Home Dose: 7.5 mg Su/Tu and 5 mg ROW per chart review)  Significant Interactions: Other (Comments), Corticosteroids, Proton Pump Inhibitor (buproprion, hydroxychloroquine)  Bridge Therapy: No    Reversal Agent Administered: Not Applicable  Comments: No new INR today.  H/H/Plt stable.  Home dose of warfarin restarted yesterday.  No s/sx of acute bleeding noted.  Omeprazole 20mg po qday was started yesterday. Will continue warfarin 5mg po tonight per home regimen and repeat INR in AM.    Plan: Will continue warfarin 5mg po tonight per home regimen and repeat INR in AM.  Education Material Provided?: Yes (no questions, pt declines packet)  Pharmacist suggested discharge dosing: consider home dose of 7.5 mg Sundays and Tuesdays, 5 mg all other days      Daphne Moy Pharm.D.

## 2017-02-16 NOTE — PROGRESS NOTES
Inpatient Anticoagulation Service Note    Date: 2/15/2017  Reason for Anticoagulation: Deep Vein Thrombosis, Other (Comments) (Hx SLE)        Hemoglobin Value: 9.8  Hematocrit Value: 31.6  Lab Platelet Value: 175  Target INR: 2.0 to 3.0    INR from last 7 days     Date/Time INR Value    02/15/17 1544 (!)2.15    02/14/17 0331 (!)2.82    02/13/17 0442 (!)3    02/12/17 0413 (!)3.27    02/11/17 0245 (!)2.04    02/09/17 0300 (!)1.46        Dose from last 7 days     Date/Time Dose (mg)    02/15/17 1100 5    02/14/17 1000 5    02/13/17 1000 5    02/12/17 1300 0    02/11/17 1200 5    02/10/17 1500 7.5    02/09/17 1100 7.5        Average Dose (mg):  (Home Dose: 7.5 mg Su/Tu and 5 mg ROW per chart review)  Significant Interactions: Other (Comments), Corticosteroids (buproprion, hydroxychloroquine)  Bridge Therapy: No   Reversal Agent Administered: Not Applicable  Comments: Therapeutic INR today, but trendig down.  Home dose of warfarin restarted.  No s/sx of acute bleeding noted.  No new warfarin-drug interactions. Will give warfarin 5mg po tonight per home regimen and repeat INR in AM.      Plan:  Will give warfarin 5mg po tonight per home regimen and repeat INR in AM.    Education Material Provided?: Yes (no questions, pt declines packet)  Pharmacist suggested discharge dosing: consider home dose of 7.5 mg Sundays and Tuesdays, 5 mg all other days     Daphne Moy Pharm.D.

## 2017-02-16 NOTE — PROGRESS NOTES
Bedside report complete. Pt resting in bed, Pain continues to be high 7 and up, Pt did get some sleep last night but has been up since 2am. VSS, A&Ox4. Call light and personal belongings within reach.

## 2017-02-16 NOTE — CARE PLAN
Problem: Safety  Goal: Will remain free from falls  Outcome: PROGRESSING AS EXPECTED  Educated Pt on safety precautions here on the unit. Pt verbalizes understanding. Pt A&Ox4 and calls when needs any help. Bed low and locked and non-skid footwear on.

## 2017-02-16 NOTE — DISCHARGE PLANNING
SW received VM from alicia with dialysis who stated that the pt has a chair at [facility]. Pts chair time is M W F. Pts first appointment is tomorrow 2/17 at 1430.

## 2017-02-16 NOTE — PROGRESS NOTES
Pt dialyzed for 3 hrs today from 0940 to 1240.  Net UF1.46L. Pt tolerated tx well. Hemostasis achieved after 15 - 20 mins d/t persistent bleeding of markus site. dsgs applied to sites prior to transfer back to her room.  Report called to Ly Spence RN.  Waited for transport 1 hr before tx.

## 2017-02-16 NOTE — PROGRESS NOTES
Nephrology Progress Note, Adult, Complex               Author: Leana Sviakumar Date & Time created: 2/16/2017  1:04 PM     Interval History:  36 year old with ESRD, SLE, who stopped coming to dialysis and subsequently came in with uremia.   Complements normal, anti-ds dna detected  Awaiting acceptance to HD unit.  No acute events, no new complaints    Review of Systems:  Review of Systems   Constitutional: Positive for malaise/fatigue. Negative for fever and chills.   Eyes: Negative.    Respiratory: Negative for cough, hemoptysis, shortness of breath and wheezing.    Cardiovascular: Negative for chest pain, palpitations, orthopnea and leg swelling.   Gastrointestinal: Negative for heartburn, nausea, vomiting, abdominal pain and diarrhea.   Genitourinary: Negative for dysuria.   Musculoskeletal: Positive for myalgias, back pain and joint pain.   Skin: Negative for itching and rash.   Neurological: Positive for focal weakness. Negative for dizziness, sensory change and headaches.       Physical Exam:  Physical Exam   Constitutional: She is oriented to person, place, and time. She appears well-developed. No distress.   HENT:   Head: Normocephalic and atraumatic.   Mouth/Throat: Oropharynx is clear and moist.   Eyes: Conjunctivae and EOM are normal. Pupils are equal, round, and reactive to light. No scleral icterus.   Neck: Normal range of motion. Neck supple. No thyromegaly present.   Cardiovascular: Normal rate and regular rhythm.    Pulmonary/Chest: Effort normal and breath sounds normal.   Abdominal: Soft. Bowel sounds are normal. She exhibits no distension and no mass. There is no tenderness.   Musculoskeletal: She exhibits no edema.   Lymphadenopathy:     She has no cervical adenopathy.   Neurological: She is alert and oriented to person, place, and time. No cranial nerve deficit.   Skin: Skin is warm and dry.       Labs:        Invalid input(s): [id number]      Recent Labs      02/15/17   1544  02/16/17   1845    SODIUM  135  135   POTASSIUM  5.0  5.1   CHLORIDE  99  101   CO2  26  25   BUN  29*  48*   CREATININE  3.51*  4.60*   MAGNESIUM  2.0   --    CALCIUM  8.6  8.7     Recent Labs      02/15/17   1544  17   0530   GLUCOSE  158*  81     Recent Labs      17   0331  02/15/17   1544  17   0530   RBC  3.19*   --   3.32*   HEMOGLOBIN  9.8*   --   10.2*   HEMATOCRIT  31.6*   --   33.5*   PLATELETCT  175   --   156*   PROTHROMBTM  30.5*  24.7*   --    INR  2.82*  2.15*   --      Recent Labs      17   0331  17   0530   WBC  15.0*  13.1*           Hemodynamics:  Temp (24hrs), Av °C (98.6 °F), Min:36.7 °C (98 °F), Max:37.2 °C (99 °F)  Temperature: 36.9 °C (98.5 °F)  Pulse  Av.7  Min: 63  Max: 122  Blood Pressure: 123/75 mmHg     Respiratory:    Respiration: 16, Pulse Oximetry: 96 %     Work Of Breathing / Effort: Mild  RUL Breath Sounds: Clear, RML Breath Sounds: Clear, RLL Breath Sounds: Clear, LASHANDA Breath Sounds: Clear, LLL Breath Sounds: Clear  Fluids:    Intake/Output Summary (Last 24 hours) at 17 1304  Last data filed at 17 0700   Gross per 24 hour   Intake    350 ml   Output      0 ml   Net    350 ml     Weight: 82.6 kg (182 lb 1.6 oz)  GI/Nutrition:  Orders Placed This Encounter   Procedures   • DIET ORDER     Standing Status: Standing      Number of Occurrences: 1      Standing Expiration Date:      Order Specific Question:  Diet:     Answer:  Renal [8]     Medical Decision Making, by Problem:  Active Hospital Problems    Diagnosis   • Fibromyalgia [M79.7]   • Hypokalemia [E87.6]   • Metabolic acidosis [E87.2]   • Hypertension [I10]   • ESRD on dialysis (CMS-HCC) [N18.6, Z99.2]   • History of lupus nephritis [Z87.448]     1. ESRD/HD  2. Anemia - Epogen with dialysis  3. Electrolytes: well controlled  4. SLE - Complements normal, anti-dsDNA detected  5. HTN: BP well controlled  Recs: needs outpt dialysis chair -[facility]            Diet: renal              Rheumatology f/u  Labs reviewed and Medications reviewed

## 2017-02-17 PROBLEM — E87.20 METABOLIC ACIDOSIS: Status: RESOLVED | Noted: 2017-02-06 | Resolved: 2017-02-17

## 2017-02-17 PROBLEM — N39.0 UTI (URINARY TRACT INFECTION): Status: RESOLVED | Noted: 2017-02-07 | Resolved: 2017-02-17

## 2017-02-17 PROBLEM — E87.6 HYPOKALEMIA: Status: RESOLVED | Noted: 2017-02-06 | Resolved: 2017-02-17

## 2017-02-17 PROBLEM — R10.9 ABDOMINAL PAIN: Status: RESOLVED | Noted: 2017-02-07 | Resolved: 2017-02-17

## 2017-02-17 LAB
INR PPP: 2.65 (ref 0.87–1.13)
PROTHROMBIN TIME: 29.1 SEC (ref 12–14.6)

## 2017-02-17 PROCEDURE — 90935 HEMODIALYSIS ONE EVALUATION: CPT

## 2017-02-17 PROCEDURE — 700111 HCHG RX REV CODE 636 W/ 250 OVERRIDE (IP): Performed by: INTERNAL MEDICINE

## 2017-02-17 PROCEDURE — 700102 HCHG RX REV CODE 250 W/ 637 OVERRIDE(OP): Performed by: HOSPITALIST

## 2017-02-17 PROCEDURE — 700102 HCHG RX REV CODE 250 W/ 637 OVERRIDE(OP): Performed by: INTERNAL MEDICINE

## 2017-02-17 PROCEDURE — 85610 PROTHROMBIN TIME: CPT

## 2017-02-17 PROCEDURE — A9270 NON-COVERED ITEM OR SERVICE: HCPCS | Performed by: HOSPITALIST

## 2017-02-17 PROCEDURE — A9270 NON-COVERED ITEM OR SERVICE: HCPCS | Performed by: INTERNAL MEDICINE

## 2017-02-17 PROCEDURE — 99239 HOSP IP/OBS DSCHRG MGMT >30: CPT | Performed by: INTERNAL MEDICINE

## 2017-02-17 RX ORDER — OMEPRAZOLE 20 MG/1
20 CAPSULE, DELAYED RELEASE ORAL DAILY
Qty: 30 CAP | Refills: 1 | Status: SHIPPED | OUTPATIENT
Start: 2017-02-17 | End: 2017-07-19

## 2017-02-17 RX ORDER — OXYCODONE HYDROCHLORIDE 30 MG/1
30 TABLET, FILM COATED, EXTENDED RELEASE ORAL EVERY 12 HOURS
Qty: 28 EACH | Refills: 0 | Status: SHIPPED | OUTPATIENT
Start: 2017-02-17 | End: 2017-03-03

## 2017-02-17 RX ORDER — PREDNISONE 5 MG/1
35 TABLET ORAL DAILY
Qty: 210 TAB | Refills: 0 | Status: SHIPPED | OUTPATIENT
Start: 2017-02-17 | End: 2017-03-19

## 2017-02-17 RX ORDER — HEPARIN SODIUM 1000 [USP'U]/ML
INJECTION, SOLUTION INTRAVENOUS; SUBCUTANEOUS
Status: DISCONTINUED
Start: 2017-02-17 | End: 2017-02-17 | Stop reason: HOSPADM

## 2017-02-17 RX ADMIN — POTASSIUM CHLORIDE 20 MEQ: 1500 TABLET, EXTENDED RELEASE ORAL at 09:21

## 2017-02-17 RX ADMIN — ERYTHROPOIETIN 10000 UNITS: 10000 INJECTION, SOLUTION INTRAVENOUS; SUBCUTANEOUS at 09:16

## 2017-02-17 RX ADMIN — HEPARIN 500 UNITS: 100 SYRINGE at 15:48

## 2017-02-17 RX ADMIN — PREGABALIN 25 MG: 25 CAPSULE ORAL at 09:22

## 2017-02-17 RX ADMIN — HYDROMORPHONE HYDROCHLORIDE 0.5 MG: 1 INJECTION, SOLUTION INTRAMUSCULAR; INTRAVENOUS; SUBCUTANEOUS at 09:42

## 2017-02-17 RX ADMIN — OMEPRAZOLE 20 MG: 20 CAPSULE, DELAYED RELEASE ORAL at 09:21

## 2017-02-17 RX ADMIN — OXYCODONE HYDROCHLORIDE 30 MG: 10 TABLET, FILM COATED, EXTENDED RELEASE ORAL at 07:45

## 2017-02-17 RX ADMIN — HEPARIN SODIUM 2000 UNITS: 1000 INJECTION, SOLUTION INTRAVENOUS; SUBCUTANEOUS at 09:55

## 2017-02-17 RX ADMIN — OXYCODONE HYDROCHLORIDE 20 MG: 10 TABLET ORAL at 07:46

## 2017-02-17 RX ADMIN — BUPROPION HYDROCHLORIDE 150 MG: 150 TABLET, FILM COATED, EXTENDED RELEASE ORAL at 09:22

## 2017-02-17 RX ADMIN — Medication 667 MG: at 07:46

## 2017-02-17 RX ADMIN — PREDNISONE 35 MG: 5 TABLET ORAL at 09:35

## 2017-02-17 RX ADMIN — HYDROMORPHONE HYDROCHLORIDE 0.5 MG: 1 INJECTION, SOLUTION INTRAMUSCULAR; INTRAVENOUS; SUBCUTANEOUS at 02:12

## 2017-02-17 RX ADMIN — OXYCODONE HYDROCHLORIDE 20 MG: 10 TABLET ORAL at 03:46

## 2017-02-17 ASSESSMENT — PAIN SCALES - GENERAL
PAINLEVEL_OUTOF10: 9
PAINLEVEL_OUTOF10: 9

## 2017-02-17 NOTE — PROGRESS NOTES
0715 Assumed care. Bedside report from JOSETTE Madrid. Pt awake, resting in bed and in no distress. Bed in low position, call light w/in reach. Calls appropriately for assistance.

## 2017-02-17 NOTE — PROGRESS NOTES
Discharge to home with grandmother in stable condition. DC instructions given, verbalized understanding.

## 2017-02-17 NOTE — PROGRESS NOTES
Inpatient Anticoagulation Service Note    Date: 2/17/2017  Reason for Anticoagulation: Deep Vein Thrombosis, Other (Comments) (Hx SLE)      Hemoglobin Value: 10.2  Hematocrit Value: 33.5  Lab Platelet Value: 156  Target INR: 2.0 to 3.0    INR from last 7 days     Date/Time INR Value    02/17/17 0220 (!)2.65    02/15/17 1544 (!)2.15    02/14/17 0331 (!)2.82    02/13/17 0442 (!)3    02/12/17 0413 (!)3.27    02/11/17 0245 (!)2.04        Dose from last 7 days     Date/Time Dose (mg)    02/17/17 1400 5    02/16/17 1500 5    02/15/17 1100 5    02/14/17 1000 5    02/13/17 1000 5    02/12/17 1300 0    02/11/17 1200 5    02/10/17 1500 7.5        Average Dose (mg):  (Home Dose: 7.5 mg Su/Tu and 5 mg ROW per chart review)  Significant Interactions: Other (Comments), Corticosteroids, Proton Pump Inhibitor (buproprion, hydroxychloroquine)  Bridge Therapy: No      Reversal Agent Administered: Not Applicable  Comments: No new INR today.  H/H/Plt stable.  Home dose of warfarin restarted yesterday.  No s/sx of acute bleeding noted.  No new warfarin-drug interaction noted since last assessment. Will continue warfarin 5mg po tonight per home regimen, change INR draws to every Monday and Thursday.     Plan:  Continue warfarin 5mg po tonight per home regimen, change INR draws to every Monday and Thursday.   Education Material Provided?: Yes (no questions, pt declines packet)  Pharmacist suggested discharge dosing: consider home dose of 7.5 mg Sundays and Tuesdays, 5 mg all other days, follow up INR within 4-5 days of discharge.      Daphne Harris.D.

## 2017-02-17 NOTE — DISCHARGE PLANNING
Pt has been accepted to [facility], MWF 1500.  Pt's first OP tx will be Monday, 2/20/17 with a check-in time of 1430.

## 2017-02-17 NOTE — DISCHARGE SUMMARY
CHIEF COMPLAINT ON ADMISSION  Chief Complaint   Patient presents with   • Generalized Body Aches     x 1 month,  increasing over last week   • Sore Throat     x 1 month.    • Flank Pain     (B) R >L side,  denies dysuria.  hx of endstage kidney disease and lupus.  has not been to dialysis in over a month.    • N/V       CODE STATUS  Full Code    HPI & HOSPITAL COURSE  This is a 36 y.o. year old female here with above issues. Patient was initially placed on  IV cefepime and sepsis protocol, but it appears that she did NOT have sepsis. Additionally, patient's majority reason for her clinical presentation included gross medical non-adherence, which is likely multi-factorial. Patient has a tested relationship with renal and has missed iHD for 2 weeks since she claims her pain is out of control. She does follow up with Dr Collado and has a pain contract with him. She dialyzed without issue here. She was found to have chest pain and initially quite grossly elevated ESR and CRP and thickened pericardium, which could be related to both her uremia from missed iHD and associated SLE and possible flare. Given this, she was started on prednisone 40 mg daily for several days and then tapered down to 35 mg daily, with some good relief in clinical symptoms and greatly reduced ESR and CRP (actually normalized). Patient has some issues with her pain and appeared to be constantly in 10/10 pain. Multiple psycho-social barriers are preventing the patient from getting the treatment and she needs. I had multiple conversations with her about these issues and she claims she wants to get better. She was started on Oxycontin CR 20 mg Q12 hours and I only prescribed 28 tablets for total of 14 days and informed her that she will need to see Dr Collado for further pain medications. Of note, her anti-DS DNA was detected but her complement levels were normal. She will need a very prolonged prednisone taper to be fully determined by Dr Collado, her  outpatient rheumatologist.    Her urine culture eventually grew out Enterococcus faecium and she received a total of unasyn and cefepime for 7 days with good resolution in her symptoms. She was resumed on her outpatient plaquenil. Her INR was therapeutic and tolerated coumadin well. Additionally, she has anemia of chronic disease and required 1 unit PRBC transfusion and IV iron infusion with a discharge H/H of 10.2/33.5.    She was able to get a new iHD chair through [facility] and will start with treatments there on Monday, 2/20/17.    Of note, she had transient severe abdominal pain during the beginning of this admission. Her CTA at that time of the abdomen showed new onset portal venous gas. Dr Hubbard of general surgery was consulted and given her benign abdominal exam elected to repeat a CTAP which showed interval resolution of this issue. Therefore, no surgery was performed and she had no recurrence of this abdominal pain.      Therefore, she is discharged in fair and stable condition with close outpatient follow-up.    SPECIFIC OUTPATIENT FOLLOW-UP  -F/U with Dr Collado of rheumatology in 1-2 weeks  -F/U with Dr Najjar of nephrology in 1-2 weeks  -F/U with Dr. Manzano of PCP in 1-2 weeks    DISCHARGE PROBLEM LIST  Active Problems:    Fibromyalgia POA: Yes    Medical non-compliance POA: Yes    Hypertension POA: Yes    ESRD on dialysis (CMS-HCC) POA: Yes    History of lupus nephritis POA: Yes    Hemorrhagic disorder due to extrinsic circulating anticoagulants (CMS-HCC) POA: Yes  Resolved Problems:    AV fistula occlusion (CMS-HCC) POA: Yes    Hypokalemia POA: Yes    Metabolic acidosis POA: Yes    Abdominal pain POA: Yes    UTI (urinary tract infection) POA: Yes      FOLLOW UP  No future appointments.  Sushila Manzano M.D.  [address] [place] [id number] [phone]    Schedule an appointment as soon as possible for a visit in 2 weeks  Hospital follow-up appointment with PCP    Sherwin ACOSTA  RAMON Collado  [address] [place] [address] [address]  [place] [address]  [phone]    In 2 weeks  Admission for lupus and kidney related issues      MEDICATIONS ON DISCHARGE   Noahthu Debby Moon   Home Medication Instructions CONI:[id number]    Printed on:02/17/17 8152   Medication Information                      Ascorbic Acid (VITAMIN C PO)  Take 1 Tab by mouth every day.             buPROPion (WELLBUTRIN XL) 150 MG XL tablet  Take 150 mg by mouth every morning.             calcium acetate (PHOSLO) 667 MG Cap  Take 667 mg by mouth 3 times a day, with meals.             cholecalciferol (VITAMIN D3) 5000 UNIT Cap  Take 10,000 Units by mouth every day.             hydroxychloroquine (PLAQUENIL) 200 MG Tab  Take 200 mg by mouth every bedtime.             lidocaine (LIDODERM) 5 % Patch  Apply 1 Patch to skin as directed every 12 hours. Apply to mid back             omeprazole (PRILOSEC) 20 MG delayed-release capsule  Take 1 Cap by mouth every day.             oxyCODONE CR 30 MG Tablet Extended Release 12 hour Abuse-Deterrent  Take 30 mg by mouth every 12 hours for 28 doses.             Oxycodone HCl 20 MG Tab  Take 0.5-1 Tabs by mouth every four hours as needed (moderate to severe pain).             predniSONE (DELTASONE) 5 MG Tab  Take 7 Tabs by mouth every day for 30 days.             pregabalin (LYRICA) 150 MG Cap  Take 150 mg by mouth 3 times a day.             promethazine (PHENERGAN) 25 MG Tab  Take 25 mg by mouth every 6 hours as needed for Nausea/Vomiting.             therapeutic multivitamin-minerals (THERAGRAN-M) Tab  Take 1 Tab by mouth every day.             tizanidine (ZANAFLEX) 4 MG Tab  Take 2 Tabs by mouth every bedtime.             trazodone (DESYREL) 50 MG Tab  Take 1 Tab by mouth every bedtime.             warfarin (COUMADIN) 5 MG Tab  Take 5-7.5 mg by mouth every evening. Pt takes:  5MG on Sun,Mon,Wed,Thur, and Fri  7.5MG on Sat and Tue                 DIET  Orders Placed This Encounter   Procedures    • DIET ORDER     Standing Status: Standing      Number of Occurrences: 1      Standing Expiration Date:      Order Specific Question:  Diet:     Answer:  Renal [8]       ACTIVITY  As tolerated.  Class 1 - no symptoms of any kind, and for whom ordinary physical activity does not cause fatigue, palpitations, dyspnea, or anginal pain.    CONSULTATIONS  -Rheumatology  -Nephrology  -Palliative care  -General surgery      PROCEDURES  -none      Imaging    ECHO-  Normal left ventricular systolic function.   No evidence of valvular abnormality based on Doppler evaluation.   No prior study is available for comparison.     U/S Chest-  There is a small right pleural effusion, too small for safe thoracentesis as the lung and liver move into the procedural field during respiration.    CTAP-      1.  Loculated right lung base effusion with associated atelectasis.  2.  Small to moderate size pericardial effusion, similar to prior  3.  Moderate stool in the colon favors changes of constipation.  4.  Hepatomegaly  5.  Splenomegaly with splenic granulomas.      CTA chest-    1.  No CT evidence for pulmonary emboli.  2.  Small to moderate RIGHT pleural fluid collection, most likely loculated, with associated atelectasis.  3.  Diffuse pericardial thickening, concerning for pericarditis.  4.  Visualized liver shows portal venous gas which is new from prior abdominal CT on 2/6/2017 and concerning for acute intra-abdominal process.  5.  Mildly prominent mediastinal lymph node, nonspecific.    CT renal with contrast-  1.  There is no evidence of nephrolithiasis, urolithiasis, or hydronephrosis.  2.  There is no acute inflammatory process within the abdomen or pelvis.  3.  There is a small right pleural effusion with bibasilar atelectasis.  4.  There is a small pericardial effusion.  5.  Evidence of previous granulomatous disease again seen with splenic calcifications.  6.  Small probable left superior pole parapelvic cyst is  unchanged.    LABORATORY  Lab Results   Component Value Date/Time    SODIUM 135 02/16/2017 05:30 AM    POTASSIUM 5.1 02/16/2017 05:30 AM    CHLORIDE 101 02/16/2017 05:30 AM    CO2 25 02/16/2017 05:30 AM    GLUCOSE 81 02/16/2017 05:30 AM    BUN 48* 02/16/2017 05:30 AM    CREATININE 4.60* 02/16/2017 05:30 AM        Lab Results   Component Value Date/Time    WBC 13.1* 02/16/2017 05:30 AM    HEMOGLOBIN 10.2* 02/16/2017 05:30 AM    HEMATOCRIT 33.5* 02/16/2017 05:30 AM    PLATELET COUNT 156* 02/16/2017 05:30 AM        Total time of the discharge process exceeds 45 minutes.

## 2017-02-17 NOTE — PROGRESS NOTES
Nephrology/Hemodialysis note    Patient with ESRD/HD  Seen and examined during dialysis treatment  Tolerates well  VS stable  Labs reviewed  3 h/ 2 K/ UF 2-3 L  Please see dialysis flow sheet for details

## 2017-02-17 NOTE — DISCHARGE INSTRUCTIONS
Discharge Instructions    Discharged to home by car with relative. Discharged via wheelchair, hospital escort: Yes.  Special equipment needed: Not Applicable    Be sure to schedule a follow-up appointment with your primary care doctor or any specialists as instructed.     Discharge Plan:   Diet Plan: Discussed  Activity Level: Discussed  Confirmed Follow up Appointment: Patient to Call and Schedule Appointment  Confirmed Symptoms Management: Discussed  Medication Reconciliation Updated: Yes  Influenza Vaccine Indication: Patient Refuses    I understand that a diet low in cholesterol, fat, and sodium is recommended for good health. Unless I have been given specific instructions below for another diet, I accept this instruction as my diet prescription.   Other diet:     Special Instructions: None    Diet: Low potassium renal healhty with 9-13 servings of fruits and vegetables  Activities as tolerated  Follow up with PCP in 7-10 days, call for appointment  No smoking, no alcohol, no caffeine  Wear seat belt when driving  Take medications as prescribed  Keep appointments  If symptoms worstens call PCP, 911 or urgent care      · Is patient discharged on Warfarin / Coumadin?   Yes    You are receiving the drug warfarin. Please understand the importance of monitoring warfarin with scheduled PT/INR blood draws.  Follow-up with a call to your personal Doctor's office in 3 days to schedule a PT/INR. .    IMPORTANT: HOW TO USE THIS INFORMATION:  This is a summary and does NOT have all possible information about this product. This information does not assure that this product is safe, effective, or appropriate for you. This information is not individual medical advice and does not substitute for the advice of your health care professional. Always ask your health care professional for complete information about this product and your specific health needs.      WARFARIN - ORAL (WARF-uh-rin)      COMMON BRAND NAME(S): Coumadin     "  WARNING:  Warfarin can cause very serious (possibly fatal) bleeding. This is more likely to occur when you first start taking this medication or if you take too much warfarin. To decrease your risk for bleeding, your doctor or other health care provider will monitor you closely and check your lab results (INR test) to make sure you are not taking too much warfarin. Keep all medical and laboratory appointments. Tell your doctor right away if you notice any signs of serious bleeding. See also Side Effects section.      USES:  This medication is used to treat blood clots (such as in deep vein thrombosis-DVT or pulmonary embolus-PE) and/or to prevent new clots from forming in your body. Preventing harmful blood clots helps to reduce the risk of a stroke or heart attack. Conditions that increase your risk of developing blood clots include a certain type of irregular heart rhythm (atrial fibrillation), heart valve replacement, recent heart attack, and certain surgeries (such as hip/knee replacement). Warfarin is commonly called a \"blood thinner,\" but the more correct term is \"anticoagulant.\" It helps to keep blood flowing smoothly in your body by decreasing the amount of certain substances (clotting proteins) in your blood.      HOW TO USE:  Read the Medication Guide provided by your pharmacist before you start taking warfarin and each time you get a refill. If you have any questions, ask your doctor or pharmacist. Take this medication by mouth with or without food as directed by your doctor or other health care professional, usually once a day. It is very important to take it exactly as directed. Do not increase the dose, take it more frequently, or stop using it unless directed by your doctor. Dosage is based on your medical condition, laboratory tests (such as INR), and response to treatment. Your doctor or other health care provider will monitor you closely while you are taking this medication to determine the right " dose for you. Use this medication regularly to get the most benefit from it. To help you remember, take it at the same time each day. It is important to eat a balanced, consistent diet while taking warfarin. Some foods can affect how warfarin works in your body and may affect your treatment and dose. Avoid sudden large increases or decreases in your intake of foods high in vitamin K (such as broccoli, cauliflower, cabbage, brussels sprouts, kale, spinach, and other green leafy vegetables, liver, green tea, certain vitamin supplements). If you are trying to lose weight, check with your doctor before you try to go on a diet. Cranberry products may also affect how your warfarin works. Limit the amount of cranberry juice (16 ounces/480 milliliters a day) or other cranberry products you may drink or eat.      SIDE EFFECTS:  Nausea, loss of appetite, or stomach/abdominal pain may occur. If any of these effects persist or worsen, tell your doctor or pharmacist promptly. Remember that your doctor has prescribed this medication because he or she has judged that the benefit to you is greater than the risk of side effects. Many people using this medication do not have serious side effects. This medication can cause serious bleeding if it affects your blood clotting proteins too much (shown by unusually high INR lab results). Even if your doctor stops your medication, this risk of bleeding can continue for up to a week. Tell your doctor right away if you have any signs of serious bleeding, including: unusual pain/swelling/discomfort, unusual/easy bruising, prolonged bleeding from cuts or gums, persistent/frequent nosebleeds, unusually heavy/prolonged menstrual flow, pink/dark urine, coughing up blood, vomit that is bloody or looks like coffee grounds, severe headache, dizziness/fainting, unusual or persistent tiredness/weakness, bloody/black/tarry stools, chest pain, shortness of breath, difficulty swallowing. Tell your doctor  right away if any of these unlikely but serious side effects occur: persistent nausea/vomiting, severe stomach/abdominal pain, yellowing eyes/skin. This drug rarely has caused very serious (possibly fatal) problems if its effects lead to small blood clots (usually at the beginning of treatment). This can lead to severe skin/tissue damage that may require surgery or amputation if left untreated. Patients with certain blood conditions (protein C or S deficiency) may be at greater risk. Get medical help right away if any of these rare but serious side effects occur: painful/red/purplish patches on the skin (such as on the toe, breast, abdomen), change in the amount of urine, vision changes, confusion, slurred speech, weakness on one side of the body. A very serious allergic reaction to this drug is rare. However, get medical help right away if you notice any symptoms of a serious allergic reaction, including: rash, itching/swelling (especially of the face/tongue/throat), severe dizziness, trouble breathing. This is not a complete list of possible side effects. If you notice other effects not listed above, contact your doctor or pharmacist. In the [place] - Call your doctor for medical advice about side effects. You may report side effects to FDA at 6-196-QLT-0456. In [place] - Call your doctor for medical advice about side effects. You may report side effects to [facility] [place] at 1-976.253.4013.      PRECAUTIONS:  Before taking warfarin, tell your doctor or pharmacist if you are allergic to it; or if you have any other allergies. This product may contain inactive ingredients, which can cause allergic reactions or other problems. Talk to your pharmacist for more details. Before using this medication, tell your doctor or pharmacist your medical history, especially of: blood disorders (such as anemia, hemophilia), bleeding problems (such as bleeding of the stomach/intestines, bleeding in the brain), blood vessel disorders (such  as aneurysms), recent major injury/surgery, liver disease, alcohol use, mental/mood disorders (including memory problems), frequent falls/injuries. It is important that all your doctors and dentists know that you take warfarin. Before having surgery or any medical/dental procedures, tell your doctor or dentist that you are taking this medication and about all the products you use (including prescription drugs, nonprescription drugs, and herbal products). Avoid getting injections into the muscles. If you must have an injection into a muscle (for example, a flu shot), it should be given in the arm. This way, it will be easier to check for bleeding and/or apply pressure bandages. This medication may cause stomach bleeding. Daily use of alcohol while using this medicine will increase your risk for stomach bleeding and may also affect how this medication works. Limit or avoid alcoholic beverages. If you have not been eating well, if you have an illness or infection that causes fever, vomiting, or diarrhea for more than 2 days, or if you start using any antibiotic medications, contact your doctor or pharmacist immediately because these conditions can affect how warfarin works. This medication can cause heavy bleeding. To lower the chance of getting cut, bruised, or injured, use great caution with sharp objects like safety razors and nail cutters. Use an electric razor when shaving and a soft toothbrush when brushing your teeth. Avoid activities such as contact sports. If you fall or injure yourself, especially if you hit your head, call your doctor immediately. Your doctor may need to check you. The Food & Drug Administration has stated that generic warfarin products are interchangeable. However, consult your doctor or pharmacist before switching warfarin products. Be careful not to take more than one medication that contains warfarin unless specifically directed by the doctor or health care provider who is monitoring  "your warfarin treatment. Older adults may be at greater risk for bleeding while using this drug. This medication is not recommended for use during pregnancy because of serious (possibly fatal) harm to an unborn baby. Discuss the use of reliable forms of birth control with your doctor. If you become pregnant or think you may be pregnant, tell your doctor immediately. If you are planning pregnancy, discuss a plan for managing your condition with your doctor before you become pregnant. Your doctor may switch the type of medication you use during pregnancy. Very small amounts of this medication may pass into breast milk but is unlikely to harm a nursing infant. Consult your doctor before breast-feeding.      DRUG INTERACTIONS:  Drug interactions may change how your medications work or increase your risk for serious side effects. This document does not contain all possible drug interactions. Keep a list of all the products you use (including prescription/nonprescription drugs and herbal products) and share it with your doctor and pharmacist. Do not start, stop, or change the dosage of any medicines without your doctor's approval. Warfarin interacts with many prescription, nonprescription, vitamin, and herbal products. This includes medications that are applied to the skin or inside the vagina or rectum. The interactions with warfarin usually result in an increase or decrease in the \"blood-thinning\" (anticoagulant) effect. Your doctor or other health care professional should closely monitor you to prevent serious bleeding or clotting problems. While taking warfarin, it is very important to tell your doctor or pharmacist of any changes in medications, vitamins, or herbal products that you are taking. Some products that may interact with this drug include: capecitabine, imatinib, mifepristone. Aspirin, aspirin-like drugs (salicylates), and nonsteroidal anti-inflammatory drugs (NSAIDs such as ibuprofen, naproxen, celecoxib) " may have effects similar to warfarin. These drugs may increase the risk of bleeding problems if taken during treatment with warfarin. Carefully check all prescription/nonprescription product labels (including drugs applied to the skin such as pain-relieving creams) since the products may contain NSAIDs or salicylates. Talk to your doctor about using a different medication (such as acetaminophen) to treat pain/fever. Low-dose aspirin and related drugs (such as clopidogrel, ticlopidine) should be continued if prescribed by your doctor for specific medical reasons such as heart attack or stroke prevention. Consult your doctor or pharmacist for more details. Many herbal products interact with warfarin. Tell your doctor before taking any herbal products, especially bromelains, coenzyme Q10, cranberry, danshen, dong quai, fenugreek, garlic, ginkgo biloba, ginseng, and Duane's wort, among others. This medication may interfere with a certain laboratory test to measure theophylline levels, possibly causing false test results. Make sure laboratory personnel and all your doctors know you use this drug.      OVERDOSE:  If overdose is suspected, contact a poison control center or emergency room immediately. [place] residents can call the [place] National Poison Hotline at 1-151.793.4725. [place] residents can call a provincial poison control center. Symptoms of overdose may include: bloody/black/tarry stools, pink/dark urine, unusual/prolonged bleeding.      NOTES:  Do not share this medication with others. Laboratory and/or medical tests (such as INR, complete blood count) must be performed periodically to monitor your progress or check for side effects. Consult your doctor for more details.      MISSED DOSE:  For the best possible benefit, do not miss any doses. If you do miss a dose and remember on the same day, take it as soon as you remember. If you remember on the next day, skip the missed dose and resume your usual dosing  schedule. Do not double the dose to catch up because this could increase your risk for bleeding. Keep a record of missed doses to give to your doctor or pharmacist. Contact your doctor or pharmacist if you miss 2 or more doses in a row.      STORAGE:  Store at room temperature away from light and moisture. Do not store in the bathroom. Keep all medications away from children and pets. Do not flush medications down the toilet or pour them into a drain unless instructed to do so. Properly discard this product when it is  or no longer needed. Consult your pharmacist or local waste disposal company for more details about how to safely discard your product.      MEDICAL ALERT:  Your condition and medication can cause complications in a medical emergency. For information about enrolling in MedicAlert, call 1-238.829.1098 ([place]) or 1-447.470.3030 ([place]).      Information last revised 2010 Copyright(c)  [employer]             · Is patient Post Blood Transfusion?  No  Depression / Suicide Risk    As you are discharged from this [facility] facility, it is important to learn how to keep safe from harming yourself.    Recognize the warning signs:  · Abrupt changes in personality, positive or negative- including increase in energy   · Giving away possessions  · Change in eating patterns- significant weight changes-  positive or negative  · Change in sleeping patterns- unable to sleep or sleeping all the time   · Unwillingness or inability to communicate  · Depression  · Unusual sadness, discouragement and loneliness  · Talk of wanting to die  · Neglect of personal appearance   · Rebelliousness- reckless behavior  · Withdrawal from people/activities they love  · Confusion- inability to concentrate     If you or a loved one observes any of these behaviors or has concerns about self-harm, here's what you can do:  · Talk about it- your feelings and reasons for harming yourself  · Remove any means that  you might use to hurt yourself (examples: pills, rope, extension cords, firearm)  · Get professional help from the community (Mental Health, Substance Abuse, psychological counseling)  · Do not be alone:Call your Safe Contact- someone whom you trust who will be there for you.  · Call your local CRISIS HOTLINE 781-3299 or 878-719-1244  · Call your local Children's Mobile Crisis Response Team [place] (031) 126-8496 or www.[employer]  · Call the toll free National Suicide Prevention Hotlines   · National Suicide Prevention Lifeline 630-168-JSLG (0582)  · National Hope Line Network 800-SUICIDE (228-4481)

## 2017-02-17 NOTE — CARE PLAN
Problem: Infection  Goal: Will remain free from infection  Outcome: PROGRESSING AS EXPECTED  Vital signs stable. Pt and family educated on proper hand hygiene. Scrub access port for at least 15 seconds.     Problem: Pain Management  Goal: Pain level will decrease to patient’s comfort goal  Outcome: PROGRESSING AS EXPECTED  Pt assessed for pain regularly and medicated PRN per MAR.

## 2017-02-18 VITALS
HEIGHT: 65 IN | RESPIRATION RATE: 16 BRPM | HEART RATE: 84 BPM | TEMPERATURE: 97.9 F | DIASTOLIC BLOOD PRESSURE: 71 MMHG | OXYGEN SATURATION: 99 % | BODY MASS INDEX: 30.34 KG/M2 | WEIGHT: 182.1 LBS | SYSTOLIC BLOOD PRESSURE: 130 MMHG

## 2017-02-18 NOTE — PROGRESS NOTES
Hemodialysis treatment on 2/17/2017 initiated on 1000 am and ended on 1301 pm and was ordered by Dr. Shaver. Please see paper flow sheets for details. Net UF: 2505 ml. Patient stable, tolerated treatment, AO x 4, afebrile, no shortness of breath noted, VSS, right arm AVF intact, no s/s of infection noted, patient w/o acute c/o or acute events during treatment and at this time. Report given to primary RN - Chandra Mills RN and verified with primary RN regarding right arm AVF post HD treatment no oozing/ no bleeding at the needle access sites post HD treatment needle removal 10 minutes pressure holding. Waited for transport to transfer patient: 30 minutes.

## 2017-02-20 NOTE — DOCUMENTATION QUERY
"DOCUMENTATION QUERY    PROVIDERS: Please select “Cosign w/ note”to reply to query.    To better represent the severity of illness of your patient, please review the following information and exercise your independent professional judgment in responding to this query.     Patient was admitted for \"metabolic acidosis probably secondary to uremia\" per ED note. \"We will dialyze patient today as    she is uremic. She may require a second treatment to fully clear her uremia\" per Renal Consult. Based upon the clinical findings, risk factors, and treatment, can uremia be further specified?    • Acute renal failure/Acute kidney injury  • Acute renal failure with tubular necrosis  • Acute renal failure with medullary necrosis  • Acute renal failure with cortical necrosis  • Acute renal insufficiency    • Acute on chronic kidney disease (ESRD documented)  • Chronic kidney disease (ESRD documented)  • Uremia without any renal failure    • Other explanation of clinical findings (please document)  • Unable to determine       The medical record reflects the following:   Clinical Findings ED Note 2/6/17, Binu Khan MD  I think her metabolic acidosis probably secondary to uremia, although cannot rule out sepsis. She is given cefepime for urinary tract infection, as she has 100/150 white cells in her urine. Her potassium, 323 is specifically concerning with a bicarbonate of 11. Concern for total body potassium depletion. Initial potassium intravenous here in the emergency department.       LABS 2/6                            LABS 2/7                      BUN 95*                             BUN 45*  CREATININE 10.25*          CREATININE 5.63*      Renal Consult 2/6/17, Spike Gotti MD  36-year-old female with lupus nephritis leading to end-stage renal disease who dialyzed previously on a Monday, Wednesday, and Friday schedule.  She actually has missed dialysis for over 2 months and came in with uremia, feeling weak, tired, " short of breath with serum electrolytes consistent with bicarbonate of 11, BUN of 95, creatinine of 10.25.      ASSESSMENT:  1.  End-stage renal disease.  The patient is noncompliant with dialysis.     Discussed at length with patient.  We will continue to discuss.  We will ask    for dialysis social worker for persistence.  We will dialyze patient today as    she is uremic.  She may require a second treatment to fully clear her uremia.  2.  Hypokalemia, severely hypokalemic, especially in light of the acidosis, we   will replete with potassium chloride p.o.  ?   Treatment IV potassium, emergent dialysis, labs, Renal consult   Risk Factors Noncompliance with renal dialysis, End-stage renal disease secondary to lupus nephritis,  fibromyalgia, hypertension, chronic pain, secondary hyperparathyroidism, anemia of chronic kidney disease.   Location within medical record ED note, PN, Renal consult    Thank you,   [email or web address]

## 2017-02-27 ENCOUNTER — HOSPITAL ENCOUNTER (EMERGENCY)
Facility: MEDICAL CENTER | Age: 37
End: 2017-02-27
Attending: EMERGENCY MEDICINE
Payer: MEDICARE

## 2017-02-27 ENCOUNTER — APPOINTMENT (OUTPATIENT)
Dept: RADIOLOGY | Facility: MEDICAL CENTER | Age: 37
End: 2017-02-27
Attending: EMERGENCY MEDICINE
Payer: MEDICARE

## 2017-02-27 VITALS
WEIGHT: 172.4 LBS | TEMPERATURE: 99.7 F | BODY MASS INDEX: 28.72 KG/M2 | HEART RATE: 71 BPM | DIASTOLIC BLOOD PRESSURE: 87 MMHG | OXYGEN SATURATION: 95 % | HEIGHT: 65 IN | RESPIRATION RATE: 14 BRPM | SYSTOLIC BLOOD PRESSURE: 170 MMHG

## 2017-02-27 DIAGNOSIS — N18.9 CHRONIC RENAL FAILURE, UNSPECIFIED STAGE: ICD-10-CM

## 2017-02-27 DIAGNOSIS — G89.29 CHRONIC LOW BACK PAIN WITHOUT SCIATICA, UNSPECIFIED BACK PAIN LATERALITY: ICD-10-CM

## 2017-02-27 DIAGNOSIS — M54.50 CHRONIC LOW BACK PAIN WITHOUT SCIATICA, UNSPECIFIED BACK PAIN LATERALITY: ICD-10-CM

## 2017-02-27 DIAGNOSIS — T82.858A: ICD-10-CM

## 2017-02-27 LAB
ALBUMIN SERPL BCP-MCNC: 3.1 G/DL (ref 3.2–4.9)
ALBUMIN/GLOB SERPL: 1.1 G/DL
ALP SERPL-CCNC: 68 U/L (ref 30–99)
ALT SERPL-CCNC: 9 U/L (ref 2–50)
ANION GAP SERPL CALC-SCNC: 12 MMOL/L (ref 0–11.9)
APTT PPP: 74 SEC (ref 24.7–36)
AST SERPL-CCNC: 9 U/L (ref 12–45)
BASOPHILS # BLD AUTO: 0.5 % (ref 0–1.8)
BASOPHILS # BLD: 0.02 K/UL (ref 0–0.12)
BILIRUB SERPL-MCNC: 0.4 MG/DL (ref 0.1–1.5)
BUN SERPL-MCNC: 51 MG/DL (ref 8–22)
CALCIUM SERPL-MCNC: 7.5 MG/DL (ref 8.5–10.5)
CHLORIDE SERPL-SCNC: 107 MMOL/L (ref 96–112)
CO2 SERPL-SCNC: 19 MMOL/L (ref 20–33)
CREAT SERPL-MCNC: 7.36 MG/DL (ref 0.5–1.4)
EOSINOPHIL # BLD AUTO: 0.01 K/UL (ref 0–0.51)
EOSINOPHIL NFR BLD: 0.2 % (ref 0–6.9)
ERYTHROCYTE [DISTWIDTH] IN BLOOD BY AUTOMATED COUNT: 58.6 FL (ref 35.9–50)
GFR SERPL CREATININE-BSD FRML MDRD: 6 ML/MIN/1.73 M 2
GLOBULIN SER CALC-MCNC: 2.9 G/DL (ref 1.9–3.5)
GLUCOSE SERPL-MCNC: 119 MG/DL (ref 65–99)
HCT VFR BLD AUTO: 35.6 % (ref 37–47)
HGB BLD-MCNC: 10.7 G/DL (ref 12–16)
IMM GRANULOCYTES # BLD AUTO: 0.04 K/UL (ref 0–0.11)
IMM GRANULOCYTES NFR BLD AUTO: 1 % (ref 0–0.9)
INR PPP: 1.09 (ref 0.87–1.13)
LYMPHOCYTES # BLD AUTO: 0.76 K/UL (ref 1–4.8)
LYMPHOCYTES NFR BLD: 18.3 % (ref 22–41)
MCH RBC QN AUTO: 30.5 PG (ref 27–33)
MCHC RBC AUTO-ENTMCNC: 30.1 G/DL (ref 33.6–35)
MCV RBC AUTO: 101.4 FL (ref 81.4–97.8)
MONOCYTES # BLD AUTO: 0.33 K/UL (ref 0–0.85)
MONOCYTES NFR BLD AUTO: 8 % (ref 0–13.4)
NEUTROPHILS # BLD AUTO: 2.99 K/UL (ref 2–7.15)
NEUTROPHILS NFR BLD: 72 % (ref 44–72)
NRBC # BLD AUTO: 0 K/UL
NRBC BLD AUTO-RTO: 0 /100 WBC
PLATELET # BLD AUTO: 105 K/UL (ref 164–446)
PMV BLD AUTO: 10.6 FL (ref 9–12.9)
POTASSIUM SERPL-SCNC: 5.7 MMOL/L (ref 3.6–5.5)
PROT SERPL-MCNC: 6 G/DL (ref 6–8.2)
PROTHROMBIN TIME: 14.4 SEC (ref 12–14.6)
RBC # BLD AUTO: 3.51 M/UL (ref 4.2–5.4)
SODIUM SERPL-SCNC: 138 MMOL/L (ref 135–145)
WBC # BLD AUTO: 4.2 K/UL (ref 4.8–10.8)

## 2017-02-27 PROCEDURE — 700111 HCHG RX REV CODE 636 W/ 250 OVERRIDE (IP): Mod: EDC | Performed by: EMERGENCY MEDICINE

## 2017-02-27 PROCEDURE — 96374 THER/PROPH/DIAG INJ IV PUSH: CPT | Mod: EDC

## 2017-02-27 PROCEDURE — 93005 ELECTROCARDIOGRAM TRACING: CPT | Mod: EDC | Performed by: EMERGENCY MEDICINE

## 2017-02-27 PROCEDURE — 36415 COLL VENOUS BLD VENIPUNCTURE: CPT | Mod: EDC

## 2017-02-27 PROCEDURE — 85025 COMPLETE CBC W/AUTO DIFF WBC: CPT | Mod: EDC

## 2017-02-27 PROCEDURE — 85610 PROTHROMBIN TIME: CPT | Mod: EDC

## 2017-02-27 PROCEDURE — 700111 HCHG RX REV CODE 636 W/ 250 OVERRIDE (IP): Mod: EDC

## 2017-02-27 PROCEDURE — 93990 DOPPLER FLOW TESTING: CPT | Mod: 26 | Performed by: SURGERY

## 2017-02-27 PROCEDURE — 85730 THROMBOPLASTIN TIME PARTIAL: CPT | Mod: EDC

## 2017-02-27 PROCEDURE — 93005 ELECTROCARDIOGRAM TRACING: CPT | Mod: EDC

## 2017-02-27 PROCEDURE — 99284 EMERGENCY DEPT VISIT MOD MDM: CPT | Mod: EDC

## 2017-02-27 PROCEDURE — 93990 DOPPLER FLOW TESTING: CPT | Mod: EDC

## 2017-02-27 PROCEDURE — 71010 DX-CHEST-PORTABLE (1 VIEW): CPT

## 2017-02-27 PROCEDURE — 80053 COMPREHEN METABOLIC PANEL: CPT | Mod: EDC

## 2017-02-27 RX ADMIN — HYDROMORPHONE HYDROCHLORIDE 1 MG: 1 INJECTION, SOLUTION INTRAMUSCULAR; INTRAVENOUS; SUBCUTANEOUS at 06:09

## 2017-02-27 RX ADMIN — HEPARIN 300 UNITS: 100 SYRINGE at 10:00

## 2017-02-27 ASSESSMENT — ENCOUNTER SYMPTOMS
SHORTNESS OF BREATH: 1
FLANK PAIN: 1
COUGH: 0
FEVER: 1
HEADACHES: 0
DIZZINESS: 0
NAUSEA: 1
MYALGIAS: 1

## 2017-02-27 ASSESSMENT — PAIN SCALES - GENERAL
PAINLEVEL_OUTOF10: 8
PAINLEVEL_OUTOF10: 8

## 2017-02-27 NOTE — ED AVS SNAPSHOT
Home Care Instructions                                                                                                                Debby Carrizales   MRN: [id number]    Department:  [facility], Emergency Dept   Date of Visit:  2/27/2017            [facility], Emergency Dept    [address] [place] [id number]    Phone:  [phone]      You were seen by     Barb Clark D.O.      Your Diagnosis Was     Arteriovenous graft stenosis, initial encounter ([facility])     T82.858A       These are the medications you received during your hospitalization from 02/27/2017 0326 to 02/27/2017 1002     Date/Time Order Dose Route Action    02/27/2017 0609 HYDROmorphone (DILAUDID) injection 1 mg 1 mg Intravenous Given    02/27/2017 1000 heparin pf injection 300-500 Units 300 Units Intracatheter Given      Follow-up Information     1. Follow up with Shabbir Ardon M.D..    Specialties:  Surgery, Radiology    Why:  call this week to set-up follow up care    Contact information    [address]  [address]  [place] [address] [phone]          2. Follow up with Fadi Najjar, M.D..    Specialty:  Nephrology    Why:  today at HD    Contact information    [address]  [address]  [place] [address] [phone]          3. Follow up with [facility], Emergency Dept.    Specialty:  Emergency Medicine    Why:  If symptoms worsen    Contact information    [address] [facility] [place] [address] [phone]      Medication Information     Review all of your home medications and newly ordered medications with your primary doctor and/or pharmacist as soon as possible. Follow medication instructions as directed by your doctor and/or pharmacist.     Please keep your complete medication list with you and share with your physician. Update the information when medications are discontinued, doses are changed, or new medications (including over-the-counter products) are  added; and carry medication information at all times in the event of emergency situations.               Medication List      ASK your doctor about these medications        Instructions    cholecalciferol 5000 UNIT Caps   Commonly known as:  VITAMIN D3    Take 10,000 Units by mouth every day.   Dose:  44201 Units       hydroxychloroquine 200 MG Tabs   Commonly known as:  PLAQUENIL    Take 200 mg by mouth every bedtime.   Dose:  200 mg       lidocaine 5 % Ptch   Commonly known as:  LIDODERM    Apply 1 Patch to skin as directed every 12 hours. Apply to mid back   Dose:  1 Patch       omeprazole 20 MG delayed-release capsule   Commonly known as:  PRILOSEC    Take 1 Cap by mouth every day.   Dose:  20 mg       * Oxycodone HCl 20 MG Tabs    Take 0.5-1 Tabs by mouth every four hours as needed (moderate to severe pain).   Dose:  10-20 mg       * OxyCODONE HCl ER 30 MG T12a    Take 30 mg by mouth every 12 hours for 28 doses.   Dose:  30 mg       PHOSLO 667 MG Caps   Generic drug:  calcium acetate    Take 667 mg by mouth 3 times a day, with meals.   Dose:  667 mg       predniSONE 5 MG Tabs   Commonly known as:  DELTASONE    Doctor's comments:  Taper down by 5 mg every 2 weeks and instructed by her rheumatologist.   Take 7 Tabs by mouth every day for 30 days.   Dose:  35 mg       pregabalin 150 MG Caps   Commonly known as:  LYRICA    Take 150 mg by mouth 3 times a day.   Dose:  150 mg       promethazine 25 MG Tabs   Commonly known as:  PHENERGAN    Take 25 mg by mouth every 6 hours as needed for Nausea/Vomiting.   Dose:  25 mg       therapeutic multivitamin-minerals Tabs    Take 1 Tab by mouth every day.   Dose:  1 Tab       tizanidine 4 MG Tabs   Commonly known as:  ZANAFLEX    Take 2 Tabs by mouth every bedtime.   Dose:  8 mg       trazodone 50 MG Tabs   Commonly known as:  DESYREL    Take 1 Tab by mouth every bedtime.   Dose:  50 mg       VITAMIN C PO    Take 1 Tab by mouth every day.   Dose:  1 Tab       warfarin 5 MG  Tabs   Commonly known as:  COUMADIN    Take 5-7.5 mg by mouth every evening. Pt takes: 5MG on Sun,Mon,Wed,Thur, and Fri 7.5MG on Sat and Tue   Dose:  5-7.5 mg       WELLBUTRIN  MG XL tablet   Generic drug:  buPROPion    Take 150 mg by mouth every morning.   Dose:  150 mg       * Notice:  This list has 2 medication(s) that are the same as other medications prescribed for you. Read the directions carefully, and ask your doctor or other care provider to review them with you.            Procedures and tests performed during your visit     APTT    CBC WITH DIFFERENTIAL    COMP METABOLIC PANEL    DX-CHEST-PORTABLE (1 VIEW)    ESTIMATED GFR    HEMODIALYSIS ACCESS DUPLX    PROTHROMBIN TIME        Discharge Instructions       Report for Hemodialysis as scheduled today at 3pm. If there is a problem they will notify Dr. Shaver and can arrange further care.     Flank Pain  Flank pain refers to pain that is located on the side of the body between the upper abdomen and the back. The pain may occur over a short period of time (acute) or may be long-term or reoccurring (chronic). It may be mild or severe. Flank pain can be caused by many things.  CAUSES   Some of the more common causes of flank pain include:  · Muscle strains.    · Muscle spasms.    · A disease of your spine (vertebral disk disease).    · A lung infection (pneumonia).    · Fluid around your lungs (pulmonary edema).    · A kidney infection.    · Kidney stones.    · A very painful skin rash caused by the chickenpox virus (shingles).    · Gallbladder disease.    HOME CARE INSTRUCTIONS   Home care will depend on the cause of your pain. In general,  · Rest as directed by your caregiver.  · Drink enough fluids to keep your urine clear or pale yellow.  · Only take over-the-counter or prescription medicines as directed by your caregiver. Some medicines may help relieve the pain.  · Tell your caregiver about any changes in your pain.  · Follow up with your caregiver as  directed.  SEEK IMMEDIATE MEDICAL CARE IF:   · Your pain is not controlled with medicine.    · You have new or worsening symptoms.  · Your pain increases.    · You have abdominal pain.    · You have shortness of breath.    · You have persistent nausea or vomiting.    · You have swelling in your abdomen.    · You feel faint or pass out.    · You have blood in your urine.  · You have a fever or persistent symptoms for more than 2-3 days.  · You have a fever and your symptoms suddenly get worse.  MAKE SURE YOU:   · Understand these instructions.  · Will watch your condition.  · Will get help right away if you are not doing well or get worse.     This information is not intended to replace advice given to you by your health care provider. Make sure you discuss any questions you have with your health care provider.     Document Released: 02/08/2007 Document Revised: 09/11/2013 Document Reviewed: 08/01/2013  [employer] Interactive Patient Education ©2016 [employer]            Patient Information     Patient Information    Following emergency treatment: all patient requiring follow-up care must return either to a private physician or a clinic if your condition worsens before you are able to obtain further medical attention, please return to the emergency room.     Billing Information    At [facility], we work to make the billing process streamlined for our patients.  Our Representatives are here to answer any questions you may have regarding your hospital bill.  If you have insurance coverage and have supplied your insurance information to us, we will submit a claim to your insurer on your behalf.  Should you have any questions regarding your bill, we can be reached online or by phone as follows:  Online: You are able pay your bills online or live chat with our representatives about any billing questions you may have. We are here to help Monday - Friday from 8:00am to 7:30pm and 9:00am - 12:00pm on Saturdays.  Please  visit [email or web address]  for more information.   Phone:  [phone] or [phone]    Please note that your emergency physician, surgeon, pathologist, radiologist, anesthesiologist, and other specialists are not employed by [facility] and will therefore bill separately for their services.  Please contact them directly for any questions concerning their bills at the numbers below:     Emergency Physician Services:  [phone]  [facility]:  [phone]  [facility]:  [phone]  [facility]:  [phone]    1. Your final bill may vary from the amount quoted upon discharge if all procedures are not complete at that time, or if your doctor has additional procedures of which we are not aware. You will receive an additional bill if you return to the Emergency Department at formerly [facility] for suture removal regardless of the facility of which the sutures were placed.     2. Please arrange for settlement of this account at the emergency registration.    3. All self-pay accounts are due in full at the time of treatment.  If you are unable to meet this obligation then payment is expected within 4-5 days.     4. If you have had radiology studies (CT, X-ray, Ultrasound, MRI), you have received a preliminary result during your emergency department visit. Please contact the radiology department [phone] to receive a copy of your final result. Please discuss the Final result with your primary physician or with the follow up physician provided.     Crisis Hotline:  [place] Crisis Hotline:  7-053-BNMKDFF or 1-214.897.1036  [place] Crisis Hotline:    1-476.984.5343 or 350-776-3031         ED Discharge Follow Up Questions    1. In order to provide you with very good care, we would like to follow up with a phone call in the next few days.  May we have your permission to contact you?     YES /  NO    2. What is the best phone number to call  you? (       )_____-__________    3. What is the best time to call you?      Morning  /  Afternoon  /  Evening                   Patient Signature:  ____________________________________________________________    Date:  ____________________________________________________________

## 2017-02-27 NOTE — ED AVS SNAPSHOT
Planet Payment Access Code: [email or web address]  Expires: 3/20/2017 10:02 AM    Planet Payment  A secure, online tool to manage your health information     [employer]’s Planet Payment® is a secure, online tool that connects you to your personalized health information from the privacy of your home -- day or night - making it very easy for you to manage your healthcare. Once the activation process is completed, you can even access your medical information using the Planet Payment ayaan, which is available for free in the Apple Ayaan store or Google Play store.     Planet Payment provides the following levels of access (as shown below):   My Chart Features   [facility] Primary Care Doctor [facility]  Specialists [facility]  Urgent  Care Non-[facility]  Primary Care  Doctor   Email your healthcare team securely and privately 24/7 X X X X   Manage appointments: schedule your next appointment; view details of past/upcoming appointments X      Request prescription refills. X      View recent personal medical records, including lab and immunizations X X X X   View health record, including health history, allergies, medications X X X X   Read reports about your outpatient visits, procedures, consult and ER notes X X X X   See your discharge summary, which is a recap of your hospital and/or ER visit that includes your diagnosis, lab results, and care plan. X X       How to register for Planet Payment:  1. Go to  [email or web address].  2. Click on the Sign Up Now box, which takes you to the New Member Sign Up page. You will need to provide the following information:  a. Enter your Planet Payment Access Code exactly as it appears at the top of this page. (You will not need to use this code after you’ve completed the sign-up process. If you do not sign up before the expiration date, you must request a new code.)   b. Enter your date of birth.   c. Enter your home email address.   d. Click Submit, and follow the next screen’s instructions.  3. Create a Planet Payment ID. This will be your Planet Payment  login ID and cannot be changed, so think of one that is secure and easy to remember.  4. Create a [employer] password. You can change your password at any time.  5. Enter your Password Reset Question and Answer. This can be used at a later time if you forget your password.   6. Enter your e-mail address. This allows you to receive e-mail notifications when new information is available in [employer].  7. Click Sign Up. You can now view your health information.    For assistance activating your [employer] account, call [phone]

## 2017-02-27 NOTE — DISCHARGE INSTRUCTIONS
Report for Hemodialysis as scheduled today at 3pm. If there is a problem they will notify Dr. Shaver and can arrange further care.     Flank Pain  Flank pain refers to pain that is located on the side of the body between the upper abdomen and the back. The pain may occur over a short period of time (acute) or may be long-term or reoccurring (chronic). It may be mild or severe. Flank pain can be caused by many things.  CAUSES   Some of the more common causes of flank pain include:  · Muscle strains.    · Muscle spasms.    · A disease of your spine (vertebral disk disease).    · A lung infection (pneumonia).    · Fluid around your lungs (pulmonary edema).    · A kidney infection.    · Kidney stones.    · A very painful skin rash caused by the chickenpox virus (shingles).    · Gallbladder disease.    HOME CARE INSTRUCTIONS   Home care will depend on the cause of your pain. In general,  · Rest as directed by your caregiver.  · Drink enough fluids to keep your urine clear or pale yellow.  · Only take over-the-counter or prescription medicines as directed by your caregiver. Some medicines may help relieve the pain.  · Tell your caregiver about any changes in your pain.  · Follow up with your caregiver as directed.  SEEK IMMEDIATE MEDICAL CARE IF:   · Your pain is not controlled with medicine.    · You have new or worsening symptoms.  · Your pain increases.    · You have abdominal pain.    · You have shortness of breath.    · You have persistent nausea or vomiting.    · You have swelling in your abdomen.    · You feel faint or pass out.    · You have blood in your urine.  · You have a fever or persistent symptoms for more than 2-3 days.  · You have a fever and your symptoms suddenly get worse.  MAKE SURE YOU:   · Understand these instructions.  · Will watch your condition.  · Will get help right away if you are not doing well or get worse.     This information is not intended to replace advice given to you by your health  care provider. Make sure you discuss any questions you have with your health care provider.     Document Released: 02/08/2007 Document Revised: 09/11/2013 Document Reviewed: 08/01/2013  [employer] Interactive Patient Education ©2016 [employer]

## 2017-02-27 NOTE — ED NOTES
Pt discharged home. Pt given discharge instructions. Pt verbalized understanding. All questions answered. Vital signs WNL upon discharge. Pt steady on feet upon discharge.

## 2017-02-27 NOTE — ED AVS SNAPSHOT
2/27/2017          Debby Carrizales  [address]  [place] [address]    Dear Debby:    [facility] wants to ensure your discharge home is safe and you or your loved ones have had all your questions answered regarding your care after you leave the hospital.    You may receive a telephone call within two days of your discharge.  This call is to make certain you understand your discharge instructions as well as ensure we provided you with the best care possible during your stay with us.     The call will only last approximately 3-5 minutes and will be done by a nurse.    Once again, we want to ensure your discharge home is safe and that you have a clear understanding of any next steps in your care.  If you have any questions or concerns, please do not hesitate to contact us, we are here for you.  Thank you for choosing [facility] for your healthcare needs.    Sincerely,    Bam Holman    [facility]

## 2017-02-27 NOTE — ED PROVIDER NOTES
"ED Provider Note    Scribed for Barb Clark D.O. by Pamela Mayes. 2/27/2017, 5:05 AM.    Primary care provider: Sushila Manzano M.D.  Means of arrival: Walk in  History obtained from: Patient  History limited by: None    CHIEF COMPLAINT  Chief Complaint   Patient presents with   • Flank Pain     (B) renal pain.     • Shortness of Breath     continual sob x 1 month,  pain with inspiration,  has not improved with dialysis   • Vascular Access Problem     (R) upper arm dialysis fistula with lots of bruising noted.  pt states that she cannot feel \"thrill\"       HPI  Debby Carrizales is a 36 y.o. female with a history of lupus presents to the Emergency Department complaining of a vascular access problem approximately 3 days ago. Patient has a history of multiple surgeries including fistula repair and thrombectomy. She has associated pain throughout her whole right arm where her fistula is located. She confirms having dialysis on Friday. Patient also complains of bilateral flank pain, which is chronic. She denies any urinary symptoms. She confirms intermittent fevers. Her highest fever at home was 102 °F, a few days ago but she is currently afebrile.    The patient also presents to the ED complaining of shortness of breath that has been chronic for the past month. It was expected to improve with dialysis however, she reports no change. No worsening.  She was recently admitted for a similar complaint. She states she had fluid on her lungs which has not been relieved with dialysis. She takes oxycodone for pain.      REVIEW OF SYSTEMS  Review of Systems   Constitutional: Positive for fever.   Respiratory: Positive for shortness of breath. Negative for cough.    Cardiovascular: Negative for chest pain.        Vascular access problem   Gastrointestinal: Positive for nausea.   Genitourinary: Positive for flank pain (bilateral). Negative for dysuria.   Musculoskeletal: Positive for myalgias.        Positive " "right arm pain   Neurological: Negative for dizziness and headaches.   All other systems reviewed and are negative.  C    PAST MEDICAL HISTORY   has a past medical history of Lupus (CMS-HCC); Fibromyalgia; Hypertension; Backpain; Avascular necrosis; Renal failure; Heart burn; Arthritis; Psychiatric disorder; UTI (urinary tract infection) (4/4/2013); Clostridium difficile colitis (5/3/2011); Pneumonia (); and Dialysis patient.    SURGICAL HISTORY   has past surgical history that includes gastroscopy-endo (4/10/2011); sclerotheraphy (4/10/2011); gastroscopy-endo (4/18/2011); colonoscopy with biopsy (4/20/2011); gastroscopy-endo (4/27/2011); other (5/2011); other abdominal surgery; av fistula creation (9/9/2014); cath placement (9/9/2014); av fistula creation (11/14/2014); av fistula creation (2/3/2015); hip arthroplasty total (Right, 1/18/2016); closed reduction (Right, 7/5/2016); av fistula creation (Right, 7/12/2016); thrombectomy (Right, 8/20/2016); thrombectomy (Right, 8/21/2016); angioplasty balloon (8/21/2016); and tracheostomy.    SOCIAL HISTORY  Social History   Substance Use Topics   • Smoking status: Current Every Day Smoker -- 0.50 packs/day for 18 years     Types: Cigarettes     Last Attempt to Quit: 06/13/2011   • Smokeless tobacco: Never Used      Comment: 1/2 ppd   • Alcohol Use: No      Comment: occ      History   Drug Use No       FAMILY HISTORY  None noted    CURRENT MEDICATIONS  Home Medications     **Home medications have not yet been reviewed for this encounter**          ALLERGIES  Allergies   Allergen Reactions   • Morphine Itching     Tolerates Dilaudid   • Seasonal Runny Nose and Itching     Hay fever, sabiha brush       PHYSICAL EXAM  VITAL SIGNS: /87 mmHg  Pulse 76  Temp(Src) 37.6 °C (99.7 °F)  Resp 20  Ht 1.651 m (5' 5\")  Wt 78.2 kg (172 lb 6.4 oz)  BMI 28.69 kg/m2  SpO2 95%  LMP 01/18/2017 (Exact Date)  Vitals reviewed.  Constitutional: Patient is oriented to person, " place, and time. Appears well-developed and well-nourished. No distress.    Head: Normocephalic and atraumatic.   Ears: Normal external ears bilaterally.   Mouth/Throat: Oropharynx is clear and moist  Eyes: Conjunctivae are normal. Pupils are equal, round, and reactive to light.   Neck: Normal range of motion. Neck supple.  Cardiovascular: Normal rate, regular rhythm and normal heart sounds. Normal peripheral pulses. Fistula in right upper extremity. There is a Palpable thrill distally and proximally over the fistula however, in the central portion there is a firm mass in the middle of her graft, concerning for thrombosis. There is Overlying ecchymosis of the right inner upper extremity. PowerPort left chest.  Pulmonary/Chest: Effort normal and breath sounds normal. No respiratory distress, no wheezes, rhonchi, or rales. No chest wall tenderness.  Abdominal: Soft. Bowel sounds are normal. There is no tenderness, rebound or guarding, or peritoneal signs. Chronic bilateral  CVA tenderness.  Musculoskeletal: No edema and no tenderness.   Neurological: No focal deficits.   Skin: Skin is warm and dry. No erythema. No pallor.   Psychiatric: Patient has a normal mood and affect.         LABS  Results for orders placed or performed during the hospital encounter of 02/27/17   CBC WITH DIFFERENTIAL   Result Value Ref Range    WBC 4.2 (L) 4.8 - 10.8 K/uL    RBC 3.51 (L) 4.20 - 5.40 M/uL    Hemoglobin 10.7 (L) 12.0 - 16.0 g/dL    Hematocrit 35.6 (L) 37.0 - 47.0 %    .4 (H) 81.4 - 97.8 fL    MCH 30.5 27.0 - 33.0 pg    MCHC 30.1 (L) 33.6 - 35.0 g/dL    RDW 58.6 (H) 35.9 - 50.0 fL    Platelet Count 105 (L) 164 - 446 K/uL    MPV 10.6 9.0 - 12.9 fL    Neutrophils-Polys 72.00 44.00 - 72.00 %    Lymphocytes 18.30 (L) 22.00 - 41.00 %    Monocytes 8.00 0.00 - 13.40 %    Eosinophils 0.20 0.00 - 6.90 %    Basophils 0.50 0.00 - 1.80 %    Immature Granulocytes 1.00 (H) 0.00 - 0.90 %    Nucleated RBC 0.00 /100 WBC    Neutrophils  (Absolute) 2.99 2.00 - 7.15 K/uL    Lymphs (Absolute) 0.76 (L) 1.00 - 4.80 K/uL    Monos (Absolute) 0.33 0.00 - 0.85 K/uL    Eos (Absolute) 0.01 0.00 - 0.51 K/uL    Baso (Absolute) 0.02 0.00 - 0.12 K/uL    Immature Granulocytes (abs) 0.04 0.00 - 0.11 K/uL    NRBC (Absolute) 0.00 K/uL   COMP METABOLIC PANEL   Result Value Ref Range    Sodium 138 135 - 145 mmol/L    Potassium 5.7 (H) 3.6 - 5.5 mmol/L    Chloride 107 96 - 112 mmol/L    Co2 19 (L) 20 - 33 mmol/L    Anion Gap 12.0 (H) 0.0 - 11.9    Glucose 119 (H) 65 - 99 mg/dL    Bun 51 (H) 8 - 22 mg/dL    Creatinine 7.36 (HH) 0.50 - 1.40 mg/dL    Calcium 7.5 (L) 8.5 - 10.5 mg/dL    AST(SGOT) 9 (L) 12 - 45 U/L    ALT(SGPT) 9 2 - 50 U/L    Alkaline Phosphatase 68 30 - 99 U/L    Total Bilirubin 0.4 0.1 - 1.5 mg/dL    Albumin 3.1 (L) 3.2 - 4.9 g/dL    Total Protein 6.0 6.0 - 8.2 g/dL    Globulin 2.9 1.9 - 3.5 g/dL    A-G Ratio 1.1 g/dL   PROTHROMBIN TIME   Result Value Ref Range    PT 14.4 12.0 - 14.6 sec    INR 1.09 0.87 - 1.13   APTT   Result Value Ref Range    APTT 74.0 (H) 24.7 - 36.0 sec   ESTIMATED GFR   Result Value Ref Range    GFR If  8 (A) >60 mL/min/1.73 m 2    GFR If Non African American 6 (A) >60 mL/min/1.73 m 2      All labs reviewed by me.    EKG Interpretation  Interpreted by me  Rhythm: normal sinus   Rate: 75  Axis: normal  Ectopy: none  Conduction: normal  ST Segments: no acute change  T Waves: no acute change  Q Waves: none  Clinical Impression: no acute changes and normal EKG    RADIOLOGY  HEMODIALYSIS ACCESS DUPLX   Preliminary Result      DX-CHEST-PORTABLE (1 VIEW)   Final Result      No acute cardiac or pulmonary abnormality is noted.        The radiologist's interpretation of all radiological studies have been reviewed by me.    COURSE & MEDICAL DECISION MAKING  Pertinent Labs & Imaging studies reviewed. (See chart for details)    Obtained and reviewed past medical records which indicated patient saw Dr. Ardon in the past for  "vascular problems and underwent a thrombectomy.    5:05 AM - Patient seen and examined at bedside. Patient's overall well-appearing and nontoxic. Many of her complaints today are chronic in nature. She doesn't have concern for possible vascular access problem with the fistula of her right upper extremity. Patient will be treated with Dilaudid 1 mg. Ordered UE venous duplex, DX chest, estimated GFR, CBC, CMP, PTT, APTT, urinalysis culture, EKG. Differential diagnoses include but not limited to: UTI, exacerbation of chronic pain, graft failure    8:24 AM vascular lab called with US results. There is a \"high-grade out flow stenosis, estimated to be >75%, though the graft is patent\". Will plan to discuss with vascular surgery.    8:44 AM Paged Dr. Ardon (vascular).    9:09 AM I discussed the patient's case and the above findings with Dr. Jansen (on call for Dr. Ardon, vascular) who advises patient discharge home and follow up with Dr. Jansen. To be scheduled for angioplasty and possible thrombectomy.    9:19 AM Patient reevaluated at bedside. Discussed lab and imaging results as seen above. Patient now reports that she was unable to be dialyzed on Friday, this is different from her original report that she was able to be dialyzed on Friday and that she has not missed dialysis. We will contact nephrology for details of the events during dialysis on Friday.     9:39 AM I discussed the patient's case and the above findings with Dr. Shaver (nephrology) who will confirm patient's dialysis history.    9:44 AM I discussed the patient's case and the above findings with Dr. Shaver (nephrology) who confirms patient was dialyzed on Friday for 2 hours. She does have a long history of noncompliance. It was noted at that time, there was stenosis of the graft. The vascular surgeon was made aware of it however, it was felt to be patent enough. At this time, the patient has a scheduled appointment for dialysis at 3 PM today. Patient " will present for dialysis. If they are unable to dialyze her, they will contact Dr. Baron and the patient will be advised to return to the ED for further evaluation at that time, vascular surgery can be recontacted. She was updated on this plan of care and agrees. Patient does have an elevated potassium, as well as other abnormalities on her CMP, no EKG changes noted. These are likely amenable to dialysis, which will be done later today. No further emergent intervention at this time. Again, the patient's aware that Dr. Shaver is up-to-date on plan for outpatient dialysis today and can reassess if this is unsuccessful. Patient be discharged home in stable condition.    The patient is referred to a primary physician for blood pressure management, diabetic screening, and for all other preventative health concerns.    DISPOSITION:  Patient will be discharged home in stable condition.    FOLLOW UP:  Shabbir Ardon M.D.  [address]  [address]  [place] [address]  [phone]      call this week to set-up follow up care    Fadi Najjar, M.D.  [address]  [address]  [place] [id number] [phone]      today at [facility] Emergency Dept  [address] [facility] [address] [phone]    If symptoms worsen      OUTPATIENT MEDICATIONS:  Discharge Medication List as of 2/27/2017 10:02 AM               FINAL IMPRESSION  1. Arteriovenous graft stenosis, initial encounter (CMS-[facility])    2. Chronic renal failure, unspecified stage    3. Chronic low back pain without sciatica, unspecified back pain laterality          Pamela BARGER), am scribing for, and in the presence of, Barb Clark D.O..    Electronically signed by: Pamela Malagon), 2/27/2017    Barb BARGER D.O. personally performed the services described in this documentation, as scribed by Pamela Mayes in my presence, and it is both accurate and complete.    The note accurately reflects work and decisions made by  me.  Barb MELCHOR [place]  2/27/2017  8:26 AM

## 2017-02-27 NOTE — ED NOTES
Implanted port accessed for blood samples. IVF at [facility]. Pt resting in [facility].

## 2017-02-27 NOTE — ED NOTES
"Debby Carrizales  Chief Complaint   Patient presents with   • Flank Pain     (B) renal pain.     • Shortness of Breath     continual sob x 1 month,  pain with inspiration,  has not improved with dialysis   • Vascular Access Problem     (R) upper arm dialysis fistula with lots of bruising noted.  pt states that she cannot feel \"thrill\"       Pt ambulatory to triage with above complaint.  Elevated BP and HR noted.   Pt returned to lobby, educated on triage process, and to inform staff of any changes or concerns.     "

## 2017-03-30 ENCOUNTER — TELEPHONE (OUTPATIENT)
Dept: NEPHROLOGY | Facility: MEDICAL CENTER | Age: 37
End: 2017-03-30

## 2017-03-30 NOTE — TELEPHONE ENCOUNTER
1. Caller Name: Debby Carrizales                      Call Back Number: [phone]    2. Message: Dialysis patient at [address] [place] [address]. She missed yesterday's treatment (M-W-F) and is c/o SOB with walking, coughing and worsening right kidney/flank pain x one month. I advised her to seek attention at ED as she isn't certain she feels well enough to go to dialysis tomorrow. Pt was previously seen in our office by Dr Shaver, but she has transferred care to Dr Najjar as she sees him in dialysis clinic.     3. Patient approves office to leave a detailed voicemail/MyChart message: N/A

## 2017-04-04 ENCOUNTER — RESOLUTE PROFESSIONAL BILLING HOSPITAL PROF FEE (OUTPATIENT)
Dept: HOSPITALIST | Facility: MEDICAL CENTER | Age: 37
End: 2017-04-04
Payer: MEDICARE

## 2017-04-04 ENCOUNTER — APPOINTMENT (OUTPATIENT)
Dept: RADIOLOGY | Facility: MEDICAL CENTER | Age: 37
DRG: 640 | End: 2017-04-04
Attending: EMERGENCY MEDICINE
Payer: MEDICARE

## 2017-04-04 ENCOUNTER — HOSPITAL ENCOUNTER (INPATIENT)
Facility: MEDICAL CENTER | Age: 37
LOS: 2 days | DRG: 640 | End: 2017-04-06
Attending: EMERGENCY MEDICINE | Admitting: INTERNAL MEDICINE
Payer: MEDICARE

## 2017-04-04 ENCOUNTER — APPOINTMENT (OUTPATIENT)
Dept: RADIOLOGY | Facility: MEDICAL CENTER | Age: 37
DRG: 640 | End: 2017-04-04
Payer: MEDICARE

## 2017-04-04 PROBLEM — R07.9 CHEST PAIN: Status: ACTIVE | Noted: 2017-04-04

## 2017-04-04 LAB
ALBUMIN SERPL BCP-MCNC: 3.8 G/DL (ref 3.2–4.9)
ALBUMIN/GLOB SERPL: 1.1 G/DL
ALP SERPL-CCNC: 82 U/L (ref 30–99)
ALT SERPL-CCNC: 7 U/L (ref 2–50)
ANION GAP SERPL CALC-SCNC: 10 MMOL/L (ref 0–11.9)
APTT PPP: 39.4 SEC (ref 24.7–36)
AST SERPL-CCNC: 11 U/L (ref 12–45)
BASOPHILS # BLD AUTO: 1 % (ref 0–1.8)
BASOPHILS # BLD: 0.07 K/UL (ref 0–0.12)
BILIRUB SERPL-MCNC: 0.4 MG/DL (ref 0.1–1.5)
BNP SERPL-MCNC: 262 PG/ML (ref 0–100)
BUN SERPL-MCNC: 52 MG/DL (ref 8–22)
CALCIUM SERPL-MCNC: 9.1 MG/DL (ref 8.5–10.5)
CHLORIDE SERPL-SCNC: 110 MMOL/L (ref 96–112)
CO2 SERPL-SCNC: 15 MMOL/L (ref 20–33)
CREAT SERPL-MCNC: 7.97 MG/DL (ref 0.5–1.4)
EKG IMPRESSION: NORMAL
EKG IMPRESSION: NORMAL
EOSINOPHIL # BLD AUTO: 0.09 K/UL (ref 0–0.51)
EOSINOPHIL NFR BLD: 1.3 % (ref 0–6.9)
ERYTHROCYTE [DISTWIDTH] IN BLOOD BY AUTOMATED COUNT: 50.4 FL (ref 35.9–50)
GFR SERPL CREATININE-BSD FRML MDRD: 6 ML/MIN/1.73 M 2
GLOBULIN SER CALC-MCNC: 3.6 G/DL (ref 1.9–3.5)
GLUCOSE SERPL-MCNC: 128 MG/DL (ref 65–99)
HCG SERPL QL: NEGATIVE
HCT VFR BLD AUTO: 38.8 % (ref 37–47)
HGB BLD-MCNC: 12.6 G/DL (ref 12–16)
IMM GRANULOCYTES # BLD AUTO: 0.02 K/UL (ref 0–0.11)
IMM GRANULOCYTES NFR BLD AUTO: 0.3 % (ref 0–0.9)
INR PPP: 1.47 (ref 0.87–1.13)
LACTATE BLD-SCNC: 1.7 MMOL/L (ref 0.5–2)
LIPASE SERPL-CCNC: 17 U/L (ref 11–82)
LYMPHOCYTES # BLD AUTO: 1.47 K/UL (ref 1–4.8)
LYMPHOCYTES NFR BLD: 20.8 % (ref 22–41)
MCH RBC QN AUTO: 31.1 PG (ref 27–33)
MCHC RBC AUTO-ENTMCNC: 32.5 G/DL (ref 33.6–35)
MCV RBC AUTO: 95.8 FL (ref 81.4–97.8)
MONOCYTES # BLD AUTO: 0.44 K/UL (ref 0–0.85)
MONOCYTES NFR BLD AUTO: 6.2 % (ref 0–13.4)
NEUTROPHILS # BLD AUTO: 4.97 K/UL (ref 2–7.15)
NEUTROPHILS NFR BLD: 70.4 % (ref 44–72)
NRBC # BLD AUTO: 0 K/UL
NRBC BLD AUTO-RTO: 0 /100 WBC
PLATELET # BLD AUTO: 187 K/UL (ref 164–446)
PMV BLD AUTO: 9.9 FL (ref 9–12.9)
POTASSIUM SERPL-SCNC: 4.5 MMOL/L (ref 3.6–5.5)
PROT SERPL-MCNC: 7.4 G/DL (ref 6–8.2)
PROTHROMBIN TIME: 18.3 SEC (ref 12–14.6)
RBC # BLD AUTO: 4.05 M/UL (ref 4.2–5.4)
SODIUM SERPL-SCNC: 135 MMOL/L (ref 135–145)
TROPONIN I SERPL-MCNC: <0.01 NG/ML (ref 0–0.04)
WBC # BLD AUTO: 7.1 K/UL (ref 4.8–10.8)

## 2017-04-04 PROCEDURE — 93005 ELECTROCARDIOGRAM TRACING: CPT | Performed by: EMERGENCY MEDICINE

## 2017-04-04 PROCEDURE — 71275 CT ANGIOGRAPHY CHEST: CPT

## 2017-04-04 PROCEDURE — 71010 DX-CHEST-LIMITED (1 VIEW): CPT

## 2017-04-04 PROCEDURE — 700111 HCHG RX REV CODE 636 W/ 250 OVERRIDE (IP): Performed by: EMERGENCY MEDICINE

## 2017-04-04 PROCEDURE — 85025 COMPLETE CBC W/AUTO DIFF WBC: CPT

## 2017-04-04 PROCEDURE — 99285 EMERGENCY DEPT VISIT HI MDM: CPT

## 2017-04-04 PROCEDURE — 83880 ASSAY OF NATRIURETIC PEPTIDE: CPT

## 2017-04-04 PROCEDURE — 700101 HCHG RX REV CODE 250: Performed by: INTERNAL MEDICINE

## 2017-04-04 PROCEDURE — 700111 HCHG RX REV CODE 636 W/ 250 OVERRIDE (IP): Performed by: INTERNAL MEDICINE

## 2017-04-04 PROCEDURE — 85730 THROMBOPLASTIN TIME PARTIAL: CPT

## 2017-04-04 PROCEDURE — 96374 THER/PROPH/DIAG INJ IV PUSH: CPT

## 2017-04-04 PROCEDURE — 770006 HCHG ROOM/CARE - MED/SURG/GYN SEMI*

## 2017-04-04 PROCEDURE — A9270 NON-COVERED ITEM OR SERVICE: HCPCS | Performed by: INTERNAL MEDICINE

## 2017-04-04 PROCEDURE — 84484 ASSAY OF TROPONIN QUANT: CPT

## 2017-04-04 PROCEDURE — 99223 1ST HOSP IP/OBS HIGH 75: CPT | Mod: AI | Performed by: INTERNAL MEDICINE

## 2017-04-04 PROCEDURE — 700102 HCHG RX REV CODE 250 W/ 637 OVERRIDE(OP): Performed by: INTERNAL MEDICINE

## 2017-04-04 PROCEDURE — 87040 BLOOD CULTURE FOR BACTERIA: CPT

## 2017-04-04 PROCEDURE — 80053 COMPREHEN METABOLIC PANEL: CPT

## 2017-04-04 PROCEDURE — 83690 ASSAY OF LIPASE: CPT

## 2017-04-04 PROCEDURE — 84703 CHORIONIC GONADOTROPIN ASSAY: CPT

## 2017-04-04 PROCEDURE — 5A1D60Z PERFORMANCE OF URINARY FILTRATION, MULTIPLE: ICD-10-PCS | Performed by: INTERNAL MEDICINE

## 2017-04-04 PROCEDURE — 85610 PROTHROMBIN TIME: CPT

## 2017-04-04 PROCEDURE — 83605 ASSAY OF LACTIC ACID: CPT

## 2017-04-04 RX ORDER — ONDANSETRON 4 MG/1
4 TABLET, ORALLY DISINTEGRATING ORAL EVERY 4 HOURS PRN
Status: DISCONTINUED | OUTPATIENT
Start: 2017-04-04 | End: 2017-04-06 | Stop reason: HOSPADM

## 2017-04-04 RX ORDER — ASCORBIC ACID 500 MG
500 TABLET ORAL DAILY
Status: ON HOLD | COMMUNITY
End: 2020-12-08

## 2017-04-04 RX ORDER — WARFARIN SODIUM 5 MG/1
5-7.5 TABLET ORAL SEE ADMIN INSTRUCTIONS
Status: CANCELLED | OUTPATIENT
Start: 2017-04-04

## 2017-04-04 RX ORDER — PREGABALIN 150 MG/1
150 CAPSULE ORAL 3 TIMES DAILY
Status: DISCONTINUED | OUTPATIENT
Start: 2017-04-04 | End: 2017-04-06 | Stop reason: HOSPADM

## 2017-04-04 RX ORDER — WARFARIN SODIUM 10 MG/1
10 TABLET ORAL
Status: COMPLETED | OUTPATIENT
Start: 2017-04-04 | End: 2017-04-04

## 2017-04-04 RX ORDER — AMOXICILLIN 250 MG
2 CAPSULE ORAL 2 TIMES DAILY
Status: DISCONTINUED | OUTPATIENT
Start: 2017-04-04 | End: 2017-04-06 | Stop reason: HOSPADM

## 2017-04-04 RX ORDER — BUPROPION HYDROCHLORIDE 150 MG/1
150 TABLET, EXTENDED RELEASE ORAL DAILY
Status: DISCONTINUED | OUTPATIENT
Start: 2017-04-05 | End: 2017-04-06 | Stop reason: HOSPADM

## 2017-04-04 RX ORDER — ONDANSETRON 2 MG/ML
4 INJECTION INTRAMUSCULAR; INTRAVENOUS EVERY 4 HOURS PRN
Status: DISCONTINUED | OUTPATIENT
Start: 2017-04-04 | End: 2017-04-06 | Stop reason: HOSPADM

## 2017-04-04 RX ORDER — WARFARIN SODIUM 7.5 MG/1
7.5 TABLET ORAL
Status: DISCONTINUED | OUTPATIENT
Start: 2017-04-08 | End: 2017-04-06 | Stop reason: HOSPADM

## 2017-04-04 RX ORDER — PROMETHAZINE HYDROCHLORIDE 25 MG/1
12.5-25 SUPPOSITORY RECTAL EVERY 4 HOURS PRN
Status: DISCONTINUED | OUTPATIENT
Start: 2017-04-04 | End: 2017-04-06 | Stop reason: HOSPADM

## 2017-04-04 RX ORDER — ASCORBIC ACID 500 MG
500 TABLET ORAL DAILY
Status: DISCONTINUED | OUTPATIENT
Start: 2017-04-05 | End: 2017-04-06 | Stop reason: HOSPADM

## 2017-04-04 RX ORDER — CALCIUM ACETATE 667 MG/1
667 TABLET ORAL
Status: DISCONTINUED | OUTPATIENT
Start: 2017-04-04 | End: 2017-04-06 | Stop reason: HOSPADM

## 2017-04-04 RX ORDER — TIZANIDINE 4 MG/1
8 TABLET ORAL
Status: DISCONTINUED | OUTPATIENT
Start: 2017-04-04 | End: 2017-04-06

## 2017-04-04 RX ORDER — WARFARIN SODIUM 5 MG/1
5 TABLET ORAL
Status: DISCONTINUED | OUTPATIENT
Start: 2017-04-05 | End: 2017-04-06 | Stop reason: HOSPADM

## 2017-04-04 RX ORDER — PROMETHAZINE HYDROCHLORIDE 25 MG/1
25 TABLET ORAL EVERY 6 HOURS PRN
Status: DISCONTINUED | OUTPATIENT
Start: 2017-04-04 | End: 2017-04-06 | Stop reason: HOSPADM

## 2017-04-04 RX ORDER — BUPROPION HYDROCHLORIDE 150 MG/1
150 TABLET ORAL EVERY MORNING
Status: DISCONTINUED | OUTPATIENT
Start: 2017-04-05 | End: 2017-04-04

## 2017-04-04 RX ORDER — OXYCODONE HYDROCHLORIDE 10 MG/1
10 TABLET ORAL EVERY 4 HOURS PRN
Status: DISCONTINUED | OUTPATIENT
Start: 2017-04-04 | End: 2017-04-06 | Stop reason: HOSPADM

## 2017-04-04 RX ORDER — POLYETHYLENE GLYCOL 3350 17 G/17G
1 POWDER, FOR SOLUTION ORAL
Status: DISCONTINUED | OUTPATIENT
Start: 2017-04-04 | End: 2017-04-06 | Stop reason: HOSPADM

## 2017-04-04 RX ORDER — OXYCODONE HYDROCHLORIDE 20 MG/1
10-20 TABLET ORAL EVERY 4 HOURS PRN
Status: DISCONTINUED | OUTPATIENT
Start: 2017-04-04 | End: 2017-04-04

## 2017-04-04 RX ORDER — TRAZODONE HYDROCHLORIDE 50 MG/1
50 TABLET ORAL
Status: DISCONTINUED | OUTPATIENT
Start: 2017-04-04 | End: 2017-04-06 | Stop reason: HOSPADM

## 2017-04-04 RX ORDER — PROMETHAZINE HYDROCHLORIDE 25 MG/1
12.5-25 TABLET ORAL EVERY 4 HOURS PRN
Status: DISCONTINUED | OUTPATIENT
Start: 2017-04-04 | End: 2017-04-06 | Stop reason: HOSPADM

## 2017-04-04 RX ORDER — LIDOCAINE 50 MG/G
1 PATCH TOPICAL EVERY 12 HOURS
Status: DISCONTINUED | OUTPATIENT
Start: 2017-04-04 | End: 2017-04-06

## 2017-04-04 RX ORDER — OMEPRAZOLE 20 MG/1
20 CAPSULE, DELAYED RELEASE ORAL DAILY
Status: DISCONTINUED | OUTPATIENT
Start: 2017-04-05 | End: 2017-04-06 | Stop reason: HOSPADM

## 2017-04-04 RX ORDER — M-VIT,TX,IRON,MINS/CALC/FOLIC 27MG-0.4MG
1 TABLET ORAL DAILY
Status: DISCONTINUED | OUTPATIENT
Start: 2017-04-05 | End: 2017-04-06 | Stop reason: HOSPADM

## 2017-04-04 RX ORDER — BISACODYL 10 MG
10 SUPPOSITORY, RECTAL RECTAL
Status: DISCONTINUED | OUTPATIENT
Start: 2017-04-04 | End: 2017-04-06 | Stop reason: HOSPADM

## 2017-04-04 RX ORDER — HYDROXYCHLOROQUINE SULFATE 200 MG/1
200 TABLET, FILM COATED ORAL
Status: DISCONTINUED | OUTPATIENT
Start: 2017-04-04 | End: 2017-04-06 | Stop reason: HOSPADM

## 2017-04-04 RX ORDER — OXYCODONE HYDROCHLORIDE 10 MG/1
20 TABLET ORAL EVERY 4 HOURS PRN
Status: DISCONTINUED | OUTPATIENT
Start: 2017-04-04 | End: 2017-04-06 | Stop reason: HOSPADM

## 2017-04-04 RX ADMIN — TIZANIDINE 8 MG: 4 TABLET ORAL at 23:46

## 2017-04-04 RX ADMIN — HEPARIN 300 UNITS: 100 SYRINGE at 23:55

## 2017-04-04 RX ADMIN — TRAZODONE HYDROCHLORIDE 50 MG: 50 TABLET ORAL at 22:17

## 2017-04-04 RX ADMIN — PREGABALIN 150 MG: 150 CAPSULE ORAL at 22:05

## 2017-04-04 RX ADMIN — PROMETHAZINE HYDROCHLORIDE 25 MG: 25 TABLET ORAL at 23:51

## 2017-04-04 RX ADMIN — HYDROXYCHLOROQUINE SULFATE 200 MG: 200 TABLET ORAL at 22:04

## 2017-04-04 RX ADMIN — OXYCODONE HYDROCHLORIDE 20 MG: 10 TABLET ORAL at 22:05

## 2017-04-04 RX ADMIN — Medication 667 MG: at 23:45

## 2017-04-04 RX ADMIN — WARFARIN SODIUM 10 MG: 10 TABLET ORAL at 22:04

## 2017-04-04 RX ADMIN — LIDOCAINE 1 PATCH: 50 PATCH CUTANEOUS at 22:02

## 2017-04-04 RX ADMIN — HYDROMORPHONE HYDROCHLORIDE 1 MG: 1 INJECTION, SOLUTION INTRAMUSCULAR; INTRAVENOUS; SUBCUTANEOUS at 16:06

## 2017-04-04 RX ADMIN — STANDARDIZED SENNA CONCENTRATE AND DOCUSATE SODIUM 2 TABLET: 8.6; 5 TABLET, FILM COATED ORAL at 22:04

## 2017-04-04 RX ADMIN — ONDANSETRON 4 MG: 4 TABLET, ORALLY DISINTEGRATING ORAL at 23:46

## 2017-04-04 ASSESSMENT — LIFESTYLE VARIABLES
EVER_SMOKED: NEVER
DO YOU DRINK ALCOHOL: NO
EVER_SMOKED: YES
ALCOHOL_USE: NO

## 2017-04-04 ASSESSMENT — PAIN SCALES - GENERAL
PAINLEVEL_OUTOF10: 8

## 2017-04-04 ASSESSMENT — PATIENT HEALTH QUESTIONNAIRE - PHQ9
5. POOR APPETITE OR OVEREATING: MORE THAN HALF THE DAYS
8. MOVING OR SPEAKING SO SLOWLY THAT OTHER PEOPLE COULD HAVE NOTICED. OR THE OPPOSITE, BEING SO FIGETY OR RESTLESS THAT YOU HAVE BEEN MOVING AROUND A LOT MORE THAN USUAL: NOT AT ALL
SUM OF ALL RESPONSES TO PHQ QUESTIONS 1-9: 12
7. TROUBLE CONCENTRATING ON THINGS, SUCH AS READING THE NEWSPAPER OR WATCHING TELEVISION: SEVERAL DAYS
9. THOUGHTS THAT YOU WOULD BE BETTER OFF DEAD, OR OF HURTING YOURSELF: NOT AT ALL
2. FEELING DOWN, DEPRESSED, IRRITABLE, OR HOPELESS: NEARLY EVERY DAY
4. FEELING TIRED OR HAVING LITTLE ENERGY: NEARLY EVERY DAY
6. FEELING BAD ABOUT YOURSELF - OR THAT YOU ARE A FAILURE OR HAVE LET YOURSELF OR YOUR FAMILY DOWN: NOT AL ALL
3. TROUBLE FALLING OR STAYING ASLEEP OR SLEEPING TOO MUCH: NEARLY EVERY DAY
SUM OF ALL RESPONSES TO PHQ9 QUESTIONS 1 AND 2: 3
1. LITTLE INTEREST OR PLEASURE IN DOING THINGS: NOT AT ALL

## 2017-04-04 NOTE — IP AVS SNAPSHOT
4/6/2017          Debby Carrizales  [address]  [address]  [place] [address]    Dear Debby:    [facility] wants to ensure your discharge home is safe and you or your loved ones have had all your questions answered regarding your care after you leave the hospital.    You may receive a telephone call within two days of your discharge.  This call is to make certain you understand your discharge instructions as well as ensure we provided you with the best care possible during your stay with us.     The call will only last approximately 3-5 minutes and will be done by a nurse.    Once again, we want to ensure your discharge home is safe and that you have a clear understanding of any next steps in your care.  If you have any questions or concerns, please do not hesitate to contact us, we are here for you.  Thank you for choosing [facility] for your healthcare needs.    Sincerely,    Bam Holman    [facility]

## 2017-04-04 NOTE — IP AVS SNAPSHOT
NoFlo Access Code: [email or web address]  Expires: 4/18/2017 10:54 AM    NoFlo  A secure, online tool to manage your health information     [employer]’s NoFlo® is a secure, online tool that connects you to your personalized health information from the privacy of your home -- day or night - making it very easy for you to manage your healthcare. Once the activation process is completed, you can even access your medical information using the NoFlo ayaan, which is available for free in the Apple Ayaan store or Google Play store.     NoFlo provides the following levels of access (as shown below):   My Chart Features   [facility] Primary Care Doctor [facility]  Specialists [facility]  Urgent  Care Non-[facility]  Primary Care  Doctor   Email your healthcare team securely and privately 24/7 X X X X   Manage appointments: schedule your next appointment; view details of past/upcoming appointments X      Request prescription refills. X      View recent personal medical records, including lab and immunizations X X X X   View health record, including health history, allergies, medications X X X X   Read reports about your outpatient visits, procedures, consult and ER notes X X X X   See your discharge summary, which is a recap of your hospital and/or ER visit that includes your diagnosis, lab results, and care plan. X X       How to register for NoFlo:  1. Go to  [email or web address].  2. Click on the Sign Up Now box, which takes you to the New Member Sign Up page. You will need to provide the following information:  a. Enter your NoFlo Access Code exactly as it appears at the top of this page. (You will not need to use this code after you’ve completed the sign-up process. If you do not sign up before the expiration date, you must request a new code.)   b. Enter your date of birth.   c. Enter your home email address.   d. Click Submit, and follow the next screen’s instructions.  3. Create a NoFlo ID. This will be your NoFlo  login ID and cannot be changed, so think of one that is secure and easy to remember.  4. Create a [employer] password. You can change your password at any time.  5. Enter your Password Reset Question and Answer. This can be used at a later time if you forget your password.   6. Enter your e-mail address. This allows you to receive e-mail notifications when new information is available in [employer].  7. Click Sign Up. You can now view your health information.    For assistance activating your [employer] account, call [phone]

## 2017-04-04 NOTE — IP AVS SNAPSHOT
" <p align=\"LEFT\"><IMG SRC=\"[email or web address]\" alt=\"Image\" WIDTH=\"50%\" HEIGHT=\"200\" BORDER=\"\"></p>                   Name:Debby Carrizales  Medical Record Number:[id number]  CSN: [id number]    YOB: 1980   Age: 36 y.o.  Sex: female  HT:1.651 m (5' 5\") WT: 80 kg (176 lb 5.9 oz)          Admit Date: 4/4/2017     Discharge Date:   Today's Date: 4/6/2017  Attending Doctor:  Martha Andrews M.D.                  Allergies:  Morphine and Seasonal          Follow-up Information     1. Follow up with Fadi Najjar, M.D..    Specialty:  Nephrology    Why:  for dialysis questions , As needed    Contact information    [address]  [address]  [place] [address] [phone]           Medication List      Take these Medications        Instructions    ascorbic acid 500 MG Tabs   Commonly known as:  ascorbic acid    Take 500 mg by mouth every day.   Dose:  500 mg       cholecalciferol 5000 UNIT Caps   Commonly known as:  VITAMIN D3    Take 10,000 Units by mouth every day.   Dose:  69734 Units       hydroxychloroquine 200 MG Tabs   Commonly known as:  PLAQUENIL    Take 200 mg by mouth every bedtime.   Dose:  200 mg       lidocaine 5 % Ptch   Commonly known as:  LIDODERM    Apply 1 Patch to skin as directed every 12 hours. Apply to mid back   Dose:  1 Patch       omeprazole 20 MG delayed-release capsule   Commonly known as:  PRILOSEC    Take 1 Cap by mouth every day.   Dose:  20 mg       Oxycodone HCl 20 MG Tabs    Take 0.5-1 Tabs by mouth every four hours as needed (moderate to severe pain).   Dose:  10-20 mg       PHOSLO 667 MG Caps   Generic drug:  calcium acetate    Take 667 mg by mouth 3 times a day, with meals.   Dose:  667 mg       pregabalin 150 MG Caps   Commonly known as:  LYRICA    Take 150 mg by mouth 3 times a day.   Dose:  150 mg       promethazine 25 MG Tabs   Commonly known as:  PHENERGAN    Take 25 mg by mouth every 6 hours as needed for Nausea/Vomiting.   Dose:  25 mg       therapeutic " multivitamin-minerals Tabs    Take 1 Tab by mouth every day.   Dose:  1 Tab       tizanidine 4 MG Tabs   Commonly known as:  ZANAFLEX    Take 2 Tabs by mouth every bedtime.   Dose:  8 mg       trazodone 50 MG Tabs   Commonly known as:  DESYREL    Take 1 Tab by mouth every bedtime.   Dose:  50 mg       warfarin 5 MG Tabs   Commonly known as:  COUMADIN    Take 5-7.5 mg by mouth See Admin Instructions. Pt takes: 5MG on Sun,Mon,Wed,Thur, and Fri 7.5MG on Sat and Tue   Dose:  5-7.5 mg       WELLBUTRIN  MG XL tablet   Generic drug:  buPROPion    Take 150 mg by mouth every morning.   Dose:  150 mg

## 2017-04-04 NOTE — ED PROVIDER NOTES
ED Provider Note    Scribed for Aaron Rae D.O. by Rakan Figueroa. 4/4/2017  3:42 PM    Primary care provider: Sushila Manzano M.D.  Means of arrival: Walk in  History obtained from: Patient   History limited by: None    CHIEF COMPLAINT  Chief Complaint   Patient presents with   • Shortness of Breath     pt reports that she needs a thoracentesis. has renal failure, lupus and fibromyalgia. pt states that she is unable to eat or sleep causing her fibromyalgia extreme pain. pt has not had dialysis x 1 week.       HPI  Debby Carrizales is a 36 y.o. female who presents to the Emergency Department shortness of breath, chest wall pain. She states that she was diagnosed with a pleural effusion the past and almost said thoracentesis secondary to her pain. They stated that she was too unstable and did not do the procedure. Since that time she's had increasing pain in the right side of her chest that increases with deep breath and movement decreases with rest. She states she is unable to go to dialysis for the last week secondary to the severity of her pain and cannot lie still for 4 hours at a time. She's had a subjective fever, nausea but no vomiting, still makes urine has had no dysuria.    REVIEW OF SYSTEMS  Pertinent positives include pain to the right side of her chest, shortness of breath, dyspnea, fever pertinent negatives include no loss of sensation or strength to upper and lower extremities.  All other systems reviewed and negative.    PAST MEDICAL HISTORY  Past Medical History   Diagnosis Date   • Lupus (CMS-HCC)    • Fibromyalgia    • Hypertension    • Backpain    • Avascular necrosis    • Renal failure      stage 4 per pt   • Heart burn    • Arthritis      all joints,r/t lupus   • Psychiatric disorder      anxiety, depression   • UTI (urinary tract infection) 4/4/2013   • Clostridium difficile colitis 5/3/2011   • Pneumonia    • Dialysis patient        SURGICAL HISTORY  Past  Surgical History   Procedure Laterality Date   • Gastroscopy-endo  4/10/2011     Performed by SYED JURADO at ENDOSCOPY [facility]   • Sclerotheraphy  4/10/2011     Performed by SYED JURADO at ENDOSCOPY [facility]   • Gastroscopy-endo  4/18/2011     Performed by ALCIRA CRUZ at ENDOSCOPY [facility]   • Colonoscopy with biopsy  4/20/2011     Performed by ALCIRA CRUZ at ENDOSCOPY [facility]   • Gastroscopy-endo  4/27/2011     Performed by PALMIRA DOAN at ENDOSCOPY [facility]   • Other  5/2011     tracheostomy   • Other abdominal surgery       kidney biopsy   • Av fistula creation  9/9/2014     Performed by Shabbir Ardon M.D. at SURGERY [facility]   • Cath placement  9/9/2014     Performed by Shabbir Ardon M.D. at SURGERY [facility]   • Av fistula creation  11/14/2014     Performed by Shabbir Ardon M.D. at SURGERY [facility]   • Av fistula creation  2/3/2015     Performed by Shabbir Ardon M.D. at SURGERY [facility]   • Hip arthroplasty total Right 1/18/2016     Procedure: HIP ARTHROPLASTY TOTAL;  Surgeon: Michael Holman M.D.;  Location: [facility];  Service:    • Closed reduction Right 7/5/2016     Procedure: CLOSED REDUCTION- Hip ;  Surgeon: Michael Holman M.D.;  Location: [facility];  Service:    • Av fistula creation Right 7/12/2016     Procedure: AV FISTULA CREATION WITH GRAFT BRACHIAL AXILLARY;  Surgeon: Shabbir Ardon M.D.;  Location: SURGERY [facility];  Service:    • Thrombectomy Right 8/20/2016     Procedure: THROMBECTOMY AV GRAFT;  Surgeon: Shabbir Ardon M.D.;  Location: [facility];  Service:    • Thrombectomy Right 8/21/2016     Procedure: THROMBECTOMY - right AV fistula graft with grams;  Surgeon: Shabbir Ardon M.D.;  Location: [facility];  Service:    • Angioplasty balloon  8/21/2016     Procedure: ANGIOPLASTY BALLOON;  Surgeon: Shabbir PRITCHARD  "RAMON Ardon;  Location: SURGERY [facility];  Service:    • Tracheostomy          SOCIAL HISTORY  Social History   Substance Use Topics   • Smoking status: Current Every Day Smoker -- 0.50 packs/day for 18 years     Types: Cigarettes     Last Attempt to Quit: 06/13/2011   • Smokeless tobacco: Never Used      Comment: 1/2 ppd   • Alcohol Use: No      Comment: occ      History   Drug Use No       FAMILY HISTORY  None noted    CURRENT MEDICATIONS  Home Medications     Reviewed by Kasia Posey (Pharmacy Tech) on 04/04/17 at 1605  Med List Status: Complete    Medication Last Dose Status    ascorbic acid (ASCORBIC ACID) 500 MG Tab 4/4/2017 Active    buPROPion (WELLBUTRIN XL) 150 MG XL tablet 4/4/2017 Active    calcium acetate (PHOSLO) 667 MG Cap 4/4/2017 Active    cholecalciferol (VITAMIN D3) 5000 UNIT Cap 4/4/2017 Active    hydroxychloroquine (PLAQUENIL) 200 MG Tab 4/3/2017 Active    lidocaine (LIDODERM) 5 % Patch 4/4/2017 Active    omeprazole (PRILOSEC) 20 MG delayed-release capsule 4/4/2017 Active    Oxycodone HCl 20 MG Tab 4/4/2017 Active    pregabalin (LYRICA) 150 MG Cap 4/4/2017 Active    promethazine (PHENERGAN) 25 MG Tab 4/3/2017 Active    therapeutic multivitamin-minerals (THERAGRAN-M) Tab 4/4/2017 Active    tizanidine (ZANAFLEX) 4 MG Tab 4/3/2017 Active    trazodone (DESYREL) 50 MG Tab 4/3/2017 Active    warfarin (COUMADIN) 5 MG Tab 4/3/2017 Active                ALLERGIES  Allergies   Allergen Reactions   • Morphine Itching     Tolerates Dilaudid   • Seasonal Runny Nose and Itching     Hay fever, sabiha brush       PHYSICAL EXAM  VITAL SIGNS: /97 mmHg  Pulse 120  Temp(Src) 37.4 °C (99.4 °F)  Resp 16  Ht 1.651 m (5' 5\")  Wt 76.5 kg (168 lb 10.4 oz)  BMI 28.07 kg/m2  SpO2 97%    Nursing notes and vitals reviewed.  Constitutional: Well developed, Well nourished, mild distress, Non-toxic appearance.   Eyes: PERRLA, EOMI, Conjunctiva normal, No discharge.   Cardiovascular: Normal heart rate, " Normal rhythm, No murmurs, No rubs, No gallops.   Thorax & Lungs: Port on anterior chest wall, tenderness throughout the right-sided chest wall, slightly decreased breath sounds right chest, normal breath sounds left chest.  Abdomen: Bowel sounds normal, Soft, No tenderness, No guarding, No rebound, No masses, No pulsatile masses.   Skin: Warm, Dry, No erythema, No rash.   Musculoskeletal: Intact distal pulses, No edema, No cyanosis, No clubbing. Good range of motion in all major joints. Fistula right upper extremity a positive thrill  Neurologic: Alert & oriented x 3, Normal motor function, Normal sensory function, No focal deficits noted.  Psychiatric: Flat affect    DIAGNOSTIC STUDIES/PROCEDURES    LABS  Results for orders placed or performed during the hospital encounter of 04/04/17   BTYPE NATRIURETIC PEPTIDE   Result Value Ref Range    B Natriuretic Peptide 262 (H) 0 - 100 pg/mL   CBC WITH DIFFERENTIAL   Result Value Ref Range    WBC 7.1 4.8 - 10.8 K/uL    RBC 4.05 (L) 4.20 - 5.40 M/uL    Hemoglobin 12.6 12.0 - 16.0 g/dL    Hematocrit 38.8 37.0 - 47.0 %    MCV 95.8 81.4 - 97.8 fL    MCH 31.1 27.0 - 33.0 pg    MCHC 32.5 (L) 33.6 - 35.0 g/dL    RDW 50.4 (H) 35.9 - 50.0 fL    Platelet Count 187 164 - 446 K/uL    MPV 9.9 9.0 - 12.9 fL    Neutrophils-Polys 70.40 44.00 - 72.00 %    Lymphocytes 20.80 (L) 22.00 - 41.00 %    Monocytes 6.20 0.00 - 13.40 %    Eosinophils 1.30 0.00 - 6.90 %    Basophils 1.00 0.00 - 1.80 %    Immature Granulocytes 0.30 0.00 - 0.90 %    Nucleated RBC 0.00 /100 WBC    Neutrophils (Absolute) 4.97 2.00 - 7.15 K/uL    Lymphs (Absolute) 1.47 1.00 - 4.80 K/uL    Monos (Absolute) 0.44 0.00 - 0.85 K/uL    Eos (Absolute) 0.09 0.00 - 0.51 K/uL    Baso (Absolute) 0.07 0.00 - 0.12 K/uL    Immature Granulocytes (abs) 0.02 0.00 - 0.11 K/uL    NRBC (Absolute) 0.00 K/uL   COMP METABOLIC PANEL   Result Value Ref Range    Sodium 135 135 - 145 mmol/L    Potassium 4.5 3.6 - 5.5 mmol/L    Chloride 110 96 - 112  mmol/L    Co2 15 (L) 20 - 33 mmol/L    Anion Gap 10.0 0.0 - 11.9    Glucose 128 (H) 65 - 99 mg/dL    Bun 52 (H) 8 - 22 mg/dL    Creatinine 7.97 (HH) 0.50 - 1.40 mg/dL    Calcium 9.1 8.5 - 10.5 mg/dL    AST(SGOT) 11 (L) 12 - 45 U/L    ALT(SGPT) 7 2 - 50 U/L    Alkaline Phosphatase 82 30 - 99 U/L    Total Bilirubin 0.4 0.1 - 1.5 mg/dL    Albumin 3.8 3.2 - 4.9 g/dL    Total Protein 7.4 6.0 - 8.2 g/dL    Globulin 3.6 (H) 1.9 - 3.5 g/dL    A-G Ratio 1.1 g/dL   LACTIC ACID   Result Value Ref Range    Lactic Acid 1.7 0.5 - 2.0 mmol/L   ESTIMATED GFR   Result Value Ref Range    GFR If  7 (A) >60 mL/min/1.73 m 2    GFR If Non African American 6 (A) >60 mL/min/1.73 m 2   PROTHROMBIN TIME   Result Value Ref Range    PT 18.3 (H) 12.0 - 14.6 sec    INR 1.47 (H) 0.87 - 1.13   PTT   Result Value Ref Range    APTT 39.4 (H) 24.7 - 36.0 sec   BETA-HCG QUALITATIVE SERUM   Result Value Ref Range    Beta-Hcg Qualitative Serum Negative Negative   TROPONIN   Result Value Ref Range    Troponin I <0.01 0.00 - 0.04 ng/mL   LIPASE   Result Value Ref Range    Lipase 17 11 - 82 U/L   EKG (ER)   Result Value Ref Range    Report       [facility] Emergency Dept.    Test Date:  2017  Pt Name:    TAYLOR WARD               Department: ER  MRN:        [id number]                      Room:       [facility]  Gender:     F                            Technician: [id number]  :        1980                   Requested By:SHANNON PLUMMER  Order #:    [id number]                    Reading MD: SHANNON PLUMMER, DO    Measurements  Intervals                                Axis  Rate:       86                           P:          28  ID:         [id number]                          QRS:        10  QRSD:       82                           T:          35  QT:         360  QTc:        431    Interpretive Statements  SINUS RHYTHM  BASELINE WANDER IN LEAD(S) V3  Compared to ECG 2017 05:50:34  No significant  changes    Electronically Signed On 4-4-2017 19:24:25 PDT by SHANNON PLUMMER,         All labs reviewed by me.        RADIOLOGY  CT-CTA CHEST PULMONARY ARTERY W/ RECONS   Final Result         1. No definite CT evidence of pulmonary embolism. Evaluation of the subsegmental branches is limited due to poor contrast enhancement.      2. Mild interlobular septal thickening and minimal groundglass opacity bilaterally could related to mild pulmonary edema.      3. Small right pleural effusion and trace pericardial effusion, less than prior.         DX-CHEST-LIMITED (1 VIEW)   Final Result      Mild pulmonary vascular congestion.      Left-sided catheter projects over the superior cavoatrial junction.        The radiologist's interpretation of all radiological studies have been reviewed by me.    COURSE & MEDICAL DECISION MAKING  Pertinent Labs & Imaging studies reviewed. (See chart for details)    3:42 PM - Patient seen and examined at bedside. Patient will be treated with Dilaudid 1 mg. Ordered DX chest, lipase, PTT, beta hcg qualitative, troponin, estimated GFR, lactic acid, BNP, blood culture, CBC, CMP, EKG to evaluate her symptoms. T  This is a 36-year-old female complaining of severe chest pain. On evaluation, the patient has no active ST elevation myocardial infarction and negative troponin. I do not believe she is experiencing acute coronary syndrome or cardiac etiology. She sliced subtherapeutic on her Coumadin as concerned secondary to her history of lupus that she may have a pulmonary embolism. For this reason, CTA pulmonary angiogram was completed. Fortunately, CTA pulmonary and that was negative for pulmonary embolism. She does have evidence of interstitial edema on the right side as well as mild/small pleural effusion. The patient does not have evidence of empyema, pneumothorax, hemothorax.   The patient required Dilaudid 1 mg IV for pain control and I do believe she has a chronic pain condition. In  addition, the patient has evidence of acidosis but no evidence of hyperkalemia and hyponatremia requiring emergent dialysis. I discussed the patient Dr. Najjar who will coordinate dialysis for tomorrow and I have discussed the patient with Dr. Purcell for admission to the hospital for pain control.         FINAL IMPRESSION  Chest pain  Pleural effusion  Chronic narcotic dependence   IRakan (Jaunibe), am scribing for, and in the presence of, Aaron Rae D.O    Electronically signed by: Rakan Figueroa (Scribe), 4/4/2017    I, Aaron Rae D.O. personally performed the services described in this documentation, as scribed by Rakan Figueroa in my presence, and it is both accurate and complete.    The note accurately reflects work and decisions made by me.  Aaron Rae  4/4/2017  7:30 PM

## 2017-04-04 NOTE — IP AVS SNAPSHOT
" Home Care Instructions                                                                                                                  Name:Debby Carrizales  Medical Record Number:[id number]  CSN: [id number]    YOB: 1980   Age: 36 y.o.  Sex: female  HT:1.651 m (5' 5\") WT: 80 kg (176 lb 5.9 oz)          Admit Date: 4/4/2017     Discharge Date:   Today's Date: 4/6/2017  Attending Doctor:  Martha Andrews M.D.                  Allergies:  Morphine and Seasonal            Discharge Instructions       Discharge Instructions    Discharged to home by car with friend. Discharged via wheelchair, hospital escort: Yes.  Special equipment needed: Not Applicable    Be sure to schedule a follow-up appointment with your primary care doctor or any specialists as instructed.     Discharge Plan:   Influenza Vaccine Indication: Patient Refuses    I understand that a diet low in cholesterol, fat, and sodium is recommended for good health. Unless I have been given specific instructions below for another diet, I accept this instruction as my diet prescription.   Other diet: Renal diet      Special Instructions: None    · Is patient discharged on Warfarin / Coumadin?   Yes    You are receiving the drug warfarin. Please understand the importance of monitoring warfarin with scheduled PT/INR blood draws.  Follow-up with a call to your personal Doctor's office in 3 days to schedule a PT/INR. .    IMPORTANT: HOW TO USE THIS INFORMATION:  This is a summary and does NOT have all possible information about this product. This information does not assure that this product is safe, effective, or appropriate for you. This information is not individual medical advice and does not substitute for the advice of your health care professional. Always ask your health care professional for complete information about this product and your specific health needs.      WARFARIN - ORAL (WARF-uh-rin)      COMMON BRAND NAME(S): Coumadin      " "  WARNING:  Warfarin can cause very serious (possibly fatal) bleeding. This is more likely to occur when you first start taking this medication or if you take too much warfarin. To decrease your risk for bleeding, your doctor or other health care provider will monitor you closely and check your lab results (INR test) to make sure you are not taking too much warfarin. Keep all medical and laboratory appointments. Tell your doctor right away if you notice any signs of serious bleeding. See also Side Effects section.      USES:  This medication is used to treat blood clots (such as in deep vein thrombosis-DVT or pulmonary embolus-PE) and/or to prevent new clots from forming in your body. Preventing harmful blood clots helps to reduce the risk of a stroke or heart attack. Conditions that increase your risk of developing blood clots include a certain type of irregular heart rhythm (atrial fibrillation), heart valve replacement, recent heart attack, and certain surgeries (such as hip/knee replacement). Warfarin is commonly called a \"blood thinner,\" but the more correct term is \"anticoagulant.\" It helps to keep blood flowing smoothly in your body by decreasing the amount of certain substances (clotting proteins) in your blood.      HOW TO USE:  Read the Medication Guide provided by your pharmacist before you start taking warfarin and each time you get a refill. If you have any questions, ask your doctor or pharmacist. Take this medication by mouth with or without food as directed by your doctor or other health care professional, usually once a day. It is very important to take it exactly as directed. Do not increase the dose, take it more frequently, or stop using it unless directed by your doctor. Dosage is based on your medical condition, laboratory tests (such as INR), and response to treatment. Your doctor or other health care provider will monitor you closely while you are taking this medication to determine the right " dose for you. Use this medication regularly to get the most benefit from it. To help you remember, take it at the same time each day. It is important to eat a balanced, consistent diet while taking warfarin. Some foods can affect how warfarin works in your body and may affect your treatment and dose. Avoid sudden large increases or decreases in your intake of foods high in vitamin K (such as broccoli, cauliflower, cabbage, brussels sprouts, kale, spinach, and other green leafy vegetables, liver, green tea, certain vitamin supplements). If you are trying to lose weight, check with your doctor before you try to go on a diet. Cranberry products may also affect how your warfarin works. Limit the amount of cranberry juice (16 ounces/480 milliliters a day) or other cranberry products you may drink or eat.      SIDE EFFECTS:  Nausea, loss of appetite, or stomach/abdominal pain may occur. If any of these effects persist or worsen, tell your doctor or pharmacist promptly. Remember that your doctor has prescribed this medication because he or she has judged that the benefit to you is greater than the risk of side effects. Many people using this medication do not have serious side effects. This medication can cause serious bleeding if it affects your blood clotting proteins too much (shown by unusually high INR lab results). Even if your doctor stops your medication, this risk of bleeding can continue for up to a week. Tell your doctor right away if you have any signs of serious bleeding, including: unusual pain/swelling/discomfort, unusual/easy bruising, prolonged bleeding from cuts or gums, persistent/frequent nosebleeds, unusually heavy/prolonged menstrual flow, pink/dark urine, coughing up blood, vomit that is bloody or looks like coffee grounds, severe headache, dizziness/fainting, unusual or persistent tiredness/weakness, bloody/black/tarry stools, chest pain, shortness of breath, difficulty swallowing. Tell your doctor  right away if any of these unlikely but serious side effects occur: persistent nausea/vomiting, severe stomach/abdominal pain, yellowing eyes/skin. This drug rarely has caused very serious (possibly fatal) problems if its effects lead to small blood clots (usually at the beginning of treatment). This can lead to severe skin/tissue damage that may require surgery or amputation if left untreated. Patients with certain blood conditions (protein C or S deficiency) may be at greater risk. Get medical help right away if any of these rare but serious side effects occur: painful/red/purplish patches on the skin (such as on the toe, breast, abdomen), change in the amount of urine, vision changes, confusion, slurred speech, weakness on one side of the body. A very serious allergic reaction to this drug is rare. However, get medical help right away if you notice any symptoms of a serious allergic reaction, including: rash, itching/swelling (especially of the face/tongue/throat), severe dizziness, trouble breathing. This is not a complete list of possible side effects. If you notice other effects not listed above, contact your doctor or pharmacist. In the [place] - Call your doctor for medical advice about side effects. You may report side effects to FDA at 7-129-OKZ-0546. In [place] - Call your doctor for medical advice about side effects. You may report side effects to [facility] [place] at [phone].      PRECAUTIONS:  Before taking warfarin, tell your doctor or pharmacist if you are allergic to it; or if you have any other allergies. This product may contain inactive ingredients, which can cause allergic reactions or other problems. Talk to your pharmacist for more details. Before using this medication, tell your doctor or pharmacist your medical history, especially of: blood disorders (such as anemia, hemophilia), bleeding problems (such as bleeding of the stomach/intestines, bleeding in the brain), blood vessel disorders (such  as aneurysms), recent major injury/surgery, liver disease, alcohol use, mental/mood disorders (including memory problems), frequent falls/injuries. It is important that all your doctors and dentists know that you take warfarin. Before having surgery or any medical/dental procedures, tell your doctor or dentist that you are taking this medication and about all the products you use (including prescription drugs, nonprescription drugs, and herbal products). Avoid getting injections into the muscles. If you must have an injection into a muscle (for example, a flu shot), it should be given in the arm. This way, it will be easier to check for bleeding and/or apply pressure bandages. This medication may cause stomach bleeding. Daily use of alcohol while using this medicine will increase your risk for stomach bleeding and may also affect how this medication works. Limit or avoid alcoholic beverages. If you have not been eating well, if you have an illness or infection that causes fever, vomiting, or diarrhea for more than 2 days, or if you start using any antibiotic medications, contact your doctor or pharmacist immediately because these conditions can affect how warfarin works. This medication can cause heavy bleeding. To lower the chance of getting cut, bruised, or injured, use great caution with sharp objects like safety razors and nail cutters. Use an electric razor when shaving and a soft toothbrush when brushing your teeth. Avoid activities such as contact sports. If you fall or injure yourself, especially if you hit your head, call your doctor immediately. Your doctor may need to check you. The Food & Drug Administration has stated that generic warfarin products are interchangeable. However, consult your doctor or pharmacist before switching warfarin products. Be careful not to take more than one medication that contains warfarin unless specifically directed by the doctor or health care provider who is monitoring  "your warfarin treatment. Older adults may be at greater risk for bleeding while using this drug. This medication is not recommended for use during pregnancy because of serious (possibly fatal) harm to an unborn baby. Discuss the use of reliable forms of birth control with your doctor. If you become pregnant or think you may be pregnant, tell your doctor immediately. If you are planning pregnancy, discuss a plan for managing your condition with your doctor before you become pregnant. Your doctor may switch the type of medication you use during pregnancy. Very small amounts of this medication may pass into breast milk but is unlikely to harm a nursing infant. Consult your doctor before breast-feeding.      DRUG INTERACTIONS:  Drug interactions may change how your medications work or increase your risk for serious side effects. This document does not contain all possible drug interactions. Keep a list of all the products you use (including prescription/nonprescription drugs and herbal products) and share it with your doctor and pharmacist. Do not start, stop, or change the dosage of any medicines without your doctor's approval. Warfarin interacts with many prescription, nonprescription, vitamin, and herbal products. This includes medications that are applied to the skin or inside the vagina or rectum. The interactions with warfarin usually result in an increase or decrease in the \"blood-thinning\" (anticoagulant) effect. Your doctor or other health care professional should closely monitor you to prevent serious bleeding or clotting problems. While taking warfarin, it is very important to tell your doctor or pharmacist of any changes in medications, vitamins, or herbal products that you are taking. Some products that may interact with this drug include: capecitabine, imatinib, mifepristone. Aspirin, aspirin-like drugs (salicylates), and nonsteroidal anti-inflammatory drugs (NSAIDs such as ibuprofen, naproxen, celecoxib) " may have effects similar to warfarin. These drugs may increase the risk of bleeding problems if taken during treatment with warfarin. Carefully check all prescription/nonprescription product labels (including drugs applied to the skin such as pain-relieving creams) since the products may contain NSAIDs or salicylates. Talk to your doctor about using a different medication (such as acetaminophen) to treat pain/fever. Low-dose aspirin and related drugs (such as clopidogrel, ticlopidine) should be continued if prescribed by your doctor for specific medical reasons such as heart attack or stroke prevention. Consult your doctor or pharmacist for more details. Many herbal products interact with warfarin. Tell your doctor before taking any herbal products, especially bromelains, coenzyme Q10, cranberry, danshen, dong quai, fenugreek, garlic, ginkgo biloba, ginseng, and Duane's wort, among others. This medication may interfere with a certain laboratory test to measure theophylline levels, possibly causing false test results. Make sure laboratory personnel and all your doctors know you use this drug.      OVERDOSE:  If overdose is suspected, contact a poison control center or emergency room immediately. [place] residents can call the [place] National Poison Hotline at 1-395.125.4814. [place] residents can call a provincial poison control center. Symptoms of overdose may include: bloody/black/tarry stools, pink/dark urine, unusual/prolonged bleeding.      NOTES:  Do not share this medication with others. Laboratory and/or medical tests (such as INR, complete blood count) must be performed periodically to monitor your progress or check for side effects. Consult your doctor for more details.      MISSED DOSE:  For the best possible benefit, do not miss any doses. If you do miss a dose and remember on the same day, take it as soon as you remember. If you remember on the next day, skip the missed dose and resume your usual dosing  schedule. Do not double the dose to catch up because this could increase your risk for bleeding. Keep a record of missed doses to give to your doctor or pharmacist. Contact your doctor or pharmacist if you miss 2 or more doses in a row.      STORAGE:  Store at room temperature away from light and moisture. Do not store in the bathroom. Keep all medications away from children and pets. Do not flush medications down the toilet or pour them into a drain unless instructed to do so. Properly discard this product when it is  or no longer needed. Consult your pharmacist or local waste disposal company for more details about how to safely discard your product.      MEDICAL ALERT:  Your condition and medication can cause complications in a medical emergency. For information about enrolling in MedicAlert, call 1-655.497.9768 ([place]) or 1-696.387.8070 ([place]).      Information last revised 2010 Copyright(c)  [employer]             · Is patient Post Blood Transfusion?  No    Depression / Suicide Risk    As you are discharged from this [facility] facility, it is important to learn how to keep safe from harming yourself.    Recognize the warning signs:  · Abrupt changes in personality, positive or negative- including increase in energy   · Giving away possessions  · Change in eating patterns- significant weight changes-  positive or negative  · Change in sleeping patterns- unable to sleep or sleeping all the time   · Unwillingness or inability to communicate  · Depression  · Unusual sadness, discouragement and loneliness  · Talk of wanting to die  · Neglect of personal appearance   · Rebelliousness- reckless behavior  · Withdrawal from people/activities they love  · Confusion- inability to concentrate     If you or a loved one observes any of these behaviors or has concerns about self-harm, here's what you can do:  · Talk about it- your feelings and reasons for harming yourself  · Remove any means  that you might use to hurt yourself (examples: pills, rope, extension cords, firearm)  · Get professional help from the community (Mental Health, Substance Abuse, psychological counseling)  · Do not be alone:Call your Safe Contact- someone whom you trust who will be there for you.  · Call your local CRISIS HOTLINE 585-6085 or 394-955-9456  · Call your local Children's Mobile Crisis Response Team [place] (085) 109-8477 or www.[employer]  · Call the toll free National Suicide Prevention Hotlines   · National Suicide Prevention Lifeline 335-671-YZYH (9189)  · National Hope Line Network 800-SUICIDE (644-3737)        Follow-up Information     1. Follow up with Fadi Najjar, M.D..    Specialty:  Nephrology    Why:  for dialysis questions , As needed    Contact information    [address]  [address]  [place] [address] [phone]           Discharge Medication Instructions:    Below are the medications your physician expects you to take upon discharge:    Review all your home medications and newly ordered medications with your doctor and/or pharmacist. Follow medication instructions as directed by your doctor and/or pharmacist.    Please keep your medication list with you and share with your physician.               Medication List      CONTINUE taking these medications        Instructions    ascorbic acid 500 MG Tabs   Last time this was given:  500 mg on 4/6/2017  7:45 AM   Commonly known as:  ascorbic acid   Next Dose Due:  Resume at home tomorrow 4/6/17 at 8am     Take 500 mg by mouth every day.   Dose:  500 mg       cholecalciferol 5000 UNIT Caps   Commonly known as:  VITAMIN D3   Next Dose Due:  Resume at home tomorrow 4/6/17 at 8am     Take 10,000 Units by mouth every day.   Dose:  89773 Units       hydroxychloroquine 200 MG Tabs   Last time this was given:  200 mg on 4/5/2017  8:11 PM   Commonly known as:  PLAQUENIL   Next Dose Due:  Resume at home tomorrow 4/6/17 at 8am     Take 200 mg by mouth every  bedtime.   Dose:  200 mg       lidocaine 5 % Ptch   Last time this was given:  1 Patch on 4/5/2017  8:08 PM   Commonly known as:  LIDODERM   Next Dose Due:  Apply tonight at 9pm     Apply 1 Patch to skin as directed every 12 hours. Apply to mid back   Dose:  1 Patch       omeprazole 20 MG delayed-release capsule   Last time this was given:  20 mg on 4/6/2017  7:45 AM   Commonly known as:  PRILOSEC   Next Dose Due:  Resume at home tomorrow 4/6/17 at 8am     Take 1 Cap by mouth every day.   Dose:  20 mg       Oxycodone HCl 20 MG Tabs   Last time this was given:  20 mg on 4/6/2017  9:35 AM   Next Dose Due:  As needed for pain      Take 0.5-1 Tabs by mouth every four hours as needed (moderate to severe pain).   Dose:  10-20 mg       PHOSLO 667 MG Caps   Generic drug:  calcium acetate   Next Dose Due:  Take with Supper tonight      Take 667 mg by mouth 3 times a day, with meals.   Dose:  667 mg       pregabalin 150 MG Caps   Last time this was given:  150 mg on 4/6/2017  7:45 AM   Commonly known as:  LYRICA   Next Dose Due:  Take today at 2pm     Take 150 mg by mouth 3 times a day.   Dose:  150 mg       promethazine 25 MG Tabs   Last time this was given:  25 mg on 4/6/2017  5:18 AM   Commonly known as:  PHENERGAN   Next Dose Due:  As needed for nausea      Take 25 mg by mouth every 6 hours as needed for Nausea/Vomiting.   Dose:  25 mg       therapeutic multivitamin-minerals Tabs   Last time this was given:  1 Tab on 4/6/2017  7:45 AM   Next Dose Due:  Resume at home tomorrow 4/6/17 at 8am     Take 1 Tab by mouth every day.   Dose:  1 Tab       tizanidine 4 MG Tabs   Last time this was given:  8 mg on 4/5/2017  8:08 PM   Commonly known as:  ZANAFLEX   Next Dose Due:  Take tonight at bedtime.     Take 2 Tabs by mouth every bedtime.   Dose:  8 mg       trazodone 50 MG Tabs   Last time this was given:  50 mg on 4/5/2017  8:08 PM   Commonly known as:  DESYREL   Next Dose Due:  Take tonight at bedtime      Take 1 Tab by mouth  every bedtime.   Dose:  50 mg       warfarin 5 MG Tabs   Last time this was given:  5 mg on 4/5/2017  6:00 PM   Commonly known as:  COUMADIN   Next Dose Due:  Take 5mg tonight. INR 2.1     Take 5-7.5 mg by mouth See Admin Instructions. Pt takes: 5MG on Sun,Mon,Wed,Thur, and Fri 7.5MG on Sat and Tue   Dose:  5-7.5 mg       WELLBUTRIN  MG XL tablet   Generic drug:  buPROPion   Next Dose Due:  Resume at home tomorrow 4/6/17 at 8am     Take 150 mg by mouth every morning.   Dose:  150 mg               Instructions           Diet / Nutrition:    Follow any diet instructions given to you by your doctor or the dietician, including how much salt (sodium) you are allowed each day.    If you are overweight, talk to your doctor about a weight reduction plan.    Activity:    Remain physically active following your doctor's instructions about exercise and activity.    Rest often.     Any time you become even a little tired or short of breath, SIT DOWN and rest.    Worsening Symptoms:    Report any of the following signs and symptoms to the doctor's office immediately:    *Pain of jaw, arm, or neck  *Chest pain not relieved by medication                               *Dizziness or loss of consciousness  *Difficulty breathing even when at rest   *More tired than usual                                       *Bleeding drainage or swelling of surgical site  *Swelling of feet, ankles, legs or stomach                 *Fever (>100ºF)  *Pink or blood tinged sputum  *Weight gain (3lbs/day or 5lbs /week)           *Shock from internal defibrillator (if applicable)  *Palpitations or irregular heartbeats                *Cool and/or numb extremities    Stroke Awareness    Common Risk Factors for Stroke include:    Age  Atrial Fibrillation  Carotid Artery Stenosis  Diabetes Mellitus  Excessive alcohol consumption  High blood pressure  Overweight   Physical inactivity  Smoking    Warning signs and symptoms of a stroke include:    *Sudden  numbness or weakness of the face, arm or leg (especially on one side of the body).  *Sudden confusion, trouble speaking or understanding.  *Sudden trouble seeing in one or both eyes.  *Sudden trouble walking, dizziness, loss of balance or coordination.Sudden severe headache with no known cause.    It is very important to get treatment quickly when a stroke occurs. If you experience any of the above warning signs, call 911 immediately.                   Disclaimer         Quit Smoking / Tobacco Use:    I understand the use of any tobacco products increases my chance of suffering from future heart disease or stroke and could cause other illnesses which may shorten my life. Quitting the use of tobacco products is the single most important thing I can do to improve my health. For further information on smoking / tobacco cessation call a Toll Free Quit Line at 1-707.493.5958 (*National [facility] [place]) or 1-154.909.3466 (American Lung Association) or you can access the web based program at www.[employer].org.    [place] Tobacco Users Help Line:  (103) 512-7213       Toll Free: 1-782.480.7880  Quit Tobacco Program [facility] (914)025-2164    Crisis Hotline:    [place] Crisis Hotline:  5-392-LDCTRMD or 1-849.883.4396    [place] Crisis Hotline:    1-849.113.1059 or 638-607-7405    Discharge Survey:   Thank you for choosing [facility]. We hope we did everything we could to make your hospital stay a pleasant one. You may be receiving a phone survey and we would appreciate your time and participation in answering the questions. Your input is very valuable to us in our efforts to improve our service to our patients and their families.        My signature on this form indicates that:    1. I have reviewed and understand the above information.  2. My questions regarding this information have been answered to my satisfaction.  3. I have formulated a plan with my discharge nurse to obtain my prescribed  medications for home.                  Disclaimer         __________________________________                     __________       ________                       Patient Signature                                                 Date                    Time

## 2017-04-04 NOTE — ED NOTES
"Chief Complaint   Patient presents with   • Shortness of Breath     pt reports that she needs a thoracentesis. has renal failure, lupus and fibromyalgia. pt states that she is unable to eat or sleep causing her fibromyalgia extreme pain. pt has not had dialysis x 1 week.     Pt straight over for protocol.  Pt very tearful in triage.  Blood pressure 165/97, pulse 120, temperature 37.4 °C (99.4 °F), resp. rate 16, height 1.651 m (5' 5\"), weight 76.5 kg (168 lb 10.4 oz), SpO2 97 %.  Pt informed of wait times. Educated on triage process.  Asked to return to triage RN for any new or worsening of symptoms. Thanked for patience.        "

## 2017-04-04 NOTE — ED NOTES
"Med rec updated and complete  Allergies reviewed.   pt stated that she attempted to eat and take  Her medications but \"it didn't go so well\".  Pt denies antibiotics in last 30 days.  Pt states that she was in the hospital in   February   "

## 2017-04-05 LAB
ALBUMIN SERPL BCP-MCNC: 3.1 G/DL (ref 3.2–4.9)
ALBUMIN/GLOB SERPL: 1 G/DL
ALP SERPL-CCNC: 67 U/L (ref 30–99)
ALT SERPL-CCNC: <5 U/L (ref 2–50)
ANION GAP SERPL CALC-SCNC: 8 MMOL/L (ref 0–11.9)
AST SERPL-CCNC: 8 U/L (ref 12–45)
BILIRUB SERPL-MCNC: 0.3 MG/DL (ref 0.1–1.5)
BUN SERPL-MCNC: 53 MG/DL (ref 8–22)
CALCIUM SERPL-MCNC: 8.3 MG/DL (ref 8.5–10.5)
CHLORIDE SERPL-SCNC: 109 MMOL/L (ref 96–112)
CO2 SERPL-SCNC: 19 MMOL/L (ref 20–33)
CREAT SERPL-MCNC: 7.74 MG/DL (ref 0.5–1.4)
ERYTHROCYTE [DISTWIDTH] IN BLOOD BY AUTOMATED COUNT: 50.9 FL (ref 35.9–50)
ERYTHROCYTE [SEDIMENTATION RATE] IN BLOOD BY WESTERGREN METHOD: 41 MM/HOUR (ref 0–20)
GFR SERPL CREATININE-BSD FRML MDRD: 6 ML/MIN/1.73 M 2
GLOBULIN SER CALC-MCNC: 3.2 G/DL (ref 1.9–3.5)
GLUCOSE SERPL-MCNC: 106 MG/DL (ref 65–99)
HAV IGM SERPL QL IA: NEGATIVE
HBV CORE IGM SER QL: NEGATIVE
HBV SURFACE AG SER QL: NEGATIVE
HCT VFR BLD AUTO: 34.5 % (ref 37–47)
HCV AB SER QL: NEGATIVE
HGB BLD-MCNC: 11 G/DL (ref 12–16)
INR PPP: 1.8 (ref 0.87–1.13)
MCH RBC QN AUTO: 31.1 PG (ref 27–33)
MCHC RBC AUTO-ENTMCNC: 31.9 G/DL (ref 33.6–35)
MCV RBC AUTO: 97.5 FL (ref 81.4–97.8)
PLATELET # BLD AUTO: 171 K/UL (ref 164–446)
PMV BLD AUTO: 10.1 FL (ref 9–12.9)
POTASSIUM SERPL-SCNC: 4.6 MMOL/L (ref 3.6–5.5)
PROT SERPL-MCNC: 6.3 G/DL (ref 6–8.2)
PROTHROMBIN TIME: 21.4 SEC (ref 12–14.6)
RBC # BLD AUTO: 3.54 M/UL (ref 4.2–5.4)
SODIUM SERPL-SCNC: 136 MMOL/L (ref 135–145)
WBC # BLD AUTO: 5.2 K/UL (ref 4.8–10.8)

## 2017-04-05 PROCEDURE — 90935 HEMODIALYSIS ONE EVALUATION: CPT

## 2017-04-05 PROCEDURE — 85610 PROTHROMBIN TIME: CPT

## 2017-04-05 PROCEDURE — 700111 HCHG RX REV CODE 636 W/ 250 OVERRIDE (IP): Performed by: INTERNAL MEDICINE

## 2017-04-05 PROCEDURE — 99233 SBSQ HOSP IP/OBS HIGH 50: CPT | Performed by: HOSPITALIST

## 2017-04-05 PROCEDURE — 80074 ACUTE HEPATITIS PANEL: CPT

## 2017-04-05 PROCEDURE — A9270 NON-COVERED ITEM OR SERVICE: HCPCS | Performed by: INTERNAL MEDICINE

## 2017-04-05 PROCEDURE — 80053 COMPREHEN METABOLIC PANEL: CPT

## 2017-04-05 PROCEDURE — 700111 HCHG RX REV CODE 636 W/ 250 OVERRIDE (IP)

## 2017-04-05 PROCEDURE — 700102 HCHG RX REV CODE 250 W/ 637 OVERRIDE(OP): Performed by: NURSE PRACTITIONER

## 2017-04-05 PROCEDURE — 85652 RBC SED RATE AUTOMATED: CPT

## 2017-04-05 PROCEDURE — A9270 NON-COVERED ITEM OR SERVICE: HCPCS | Performed by: NURSE PRACTITIONER

## 2017-04-05 PROCEDURE — 700101 HCHG RX REV CODE 250: Performed by: INTERNAL MEDICINE

## 2017-04-05 PROCEDURE — 700102 HCHG RX REV CODE 250 W/ 637 OVERRIDE(OP): Performed by: INTERNAL MEDICINE

## 2017-04-05 PROCEDURE — 85027 COMPLETE CBC AUTOMATED: CPT

## 2017-04-05 PROCEDURE — 770006 HCHG ROOM/CARE - MED/SURG/GYN SEMI*

## 2017-04-05 RX ORDER — HEPARIN SODIUM 1000 [USP'U]/ML
1500 INJECTION, SOLUTION INTRAVENOUS; SUBCUTANEOUS
Status: DISCONTINUED | OUTPATIENT
Start: 2017-04-06 | End: 2017-04-06 | Stop reason: HOSPADM

## 2017-04-05 RX ORDER — LIDOCAINE HYDROCHLORIDE 10 MG/ML
1 INJECTION, SOLUTION INFILTRATION; PERINEURAL PRN
Status: DISCONTINUED | OUTPATIENT
Start: 2017-04-05 | End: 2017-04-06 | Stop reason: HOSPADM

## 2017-04-05 RX ORDER — HEPARIN SODIUM 1000 [USP'U]/ML
INJECTION, SOLUTION INTRAVENOUS; SUBCUTANEOUS
Status: COMPLETED
Start: 2017-04-05 | End: 2017-04-05

## 2017-04-05 RX ORDER — ACETAMINOPHEN 325 MG/1
650 TABLET ORAL EVERY 6 HOURS PRN
Status: DISCONTINUED | OUTPATIENT
Start: 2017-04-05 | End: 2017-04-06 | Stop reason: HOSPADM

## 2017-04-05 RX ADMIN — PREGABALIN 150 MG: 150 CAPSULE ORAL at 08:17

## 2017-04-05 RX ADMIN — ONDANSETRON 4 MG: 2 INJECTION, SOLUTION INTRAMUSCULAR; INTRAVENOUS at 20:07

## 2017-04-05 RX ADMIN — LIDOCAINE 1 PATCH: 50 PATCH CUTANEOUS at 20:08

## 2017-04-05 RX ADMIN — WARFARIN SODIUM 5 MG: 5 TABLET ORAL at 18:00

## 2017-04-05 RX ADMIN — OXYCODONE HYDROCHLORIDE AND ACETAMINOPHEN 500 MG: 500 TABLET ORAL at 08:17

## 2017-04-05 RX ADMIN — HEPARIN SODIUM 1500 UNITS: 1000 INJECTION, SOLUTION INTRAVENOUS; SUBCUTANEOUS at 13:50

## 2017-04-05 RX ADMIN — PROMETHAZINE HYDROCHLORIDE 25 MG: 25 TABLET ORAL at 12:12

## 2017-04-05 RX ADMIN — TRAZODONE HYDROCHLORIDE 50 MG: 50 TABLET ORAL at 20:08

## 2017-04-05 RX ADMIN — Medication 667 MG: at 12:07

## 2017-04-05 RX ADMIN — Medication 667 MG: at 17:34

## 2017-04-05 RX ADMIN — Medication 667 MG: at 08:17

## 2017-04-05 RX ADMIN — MULTIPLE VITAMINS W/ MINERALS TAB 1 TABLET: TAB at 08:16

## 2017-04-05 RX ADMIN — PREGABALIN 150 MG: 150 CAPSULE ORAL at 20:08

## 2017-04-05 RX ADMIN — OMEPRAZOLE 20 MG: 20 CAPSULE, DELAYED RELEASE ORAL at 08:16

## 2017-04-05 RX ADMIN — STANDARDIZED SENNA CONCENTRATE AND DOCUSATE SODIUM 2 TABLET: 8.6; 5 TABLET, FILM COATED ORAL at 08:16

## 2017-04-05 RX ADMIN — STANDARDIZED SENNA CONCENTRATE AND DOCUSATE SODIUM 2 TABLET: 8.6; 5 TABLET, FILM COATED ORAL at 20:07

## 2017-04-05 RX ADMIN — HYDROXYCHLOROQUINE SULFATE 200 MG: 200 TABLET ORAL at 20:11

## 2017-04-05 RX ADMIN — OXYCODONE HYDROCHLORIDE 20 MG: 10 TABLET ORAL at 12:07

## 2017-04-05 RX ADMIN — OXYCODONE HYDROCHLORIDE 20 MG: 10 TABLET ORAL at 04:37

## 2017-04-05 RX ADMIN — OXYCODONE HYDROCHLORIDE 20 MG: 10 TABLET ORAL at 21:48

## 2017-04-05 RX ADMIN — OXYCODONE HYDROCHLORIDE 20 MG: 10 TABLET ORAL at 17:34

## 2017-04-05 RX ADMIN — HEPARIN 300 UNITS: 100 SYRINGE at 04:37

## 2017-04-05 RX ADMIN — OXYCODONE HYDROCHLORIDE 20 MG: 10 TABLET ORAL at 08:32

## 2017-04-05 RX ADMIN — BUPROPION HYDROCHLORIDE 150 MG: 150 TABLET, FILM COATED, EXTENDED RELEASE ORAL at 09:00

## 2017-04-05 RX ADMIN — ACETAMINOPHEN 650 MG: 325 TABLET, FILM COATED ORAL at 21:48

## 2017-04-05 RX ADMIN — VITAMIN D, TAB 1000IU (100/BT) 10000 UNITS: 25 TAB at 09:00

## 2017-04-05 RX ADMIN — TIZANIDINE 8 MG: 4 TABLET ORAL at 20:08

## 2017-04-05 ASSESSMENT — PAIN SCALES - GENERAL
PAINLEVEL_OUTOF10: 8
PAINLEVEL_OUTOF10: 2
PAINLEVEL_OUTOF10: 5
PAINLEVEL_OUTOF10: 8
PAINLEVEL_OUTOF10: 2
PAINLEVEL_OUTOF10: 9
PAINLEVEL_OUTOF10: 8
PAINLEVEL_OUTOF10: 8

## 2017-04-05 ASSESSMENT — ENCOUNTER SYMPTOMS
DEPRESSION: 1
NERVOUS/ANXIOUS: 1
CLAUDICATION: 0
PALPITATIONS: 0
VOMITING: 0
NAUSEA: 1
PND: 0
ORTHOPNEA: 0
WEAKNESS: 1

## 2017-04-05 ASSESSMENT — LIFESTYLE VARIABLES: DO YOU DRINK ALCOHOL: NO

## 2017-04-05 NOTE — ASSESSMENT & PLAN NOTE
Causing chronic pain and narcotic dependence.  Continue home pain regimen.  Patient informed on admission that no additional pain medication will be added to her regimen here.

## 2017-04-05 NOTE — PROGRESS NOTES
Inpatient Anticoagulation Service Note    Date: 4/4/2017  Reason for Anticoagulation: Deep Vein Thrombosis        Hemoglobin Value: 12.6  Hematocrit Value: 38.8  Lab Platelet Value: 187  Target INR: 2.0 to 3.0    INR from last 7 days     Date/Time INR Value    04/04/17 1414 (!)1.47        Dose from last 7 days     Date/Time Dose (mg)    04/04/17 2000 10        Average Dose (mg): 5.7 (7.5mg Saturday/tuesday; 5mg AOD)  Significant Interactions: Proton Pump Inhibitor (trazodone, buproprion)     Comments:  1. Pt admitted with a subtherapeutic INR   Review of records shows patient's last DVT was 11/2014  2. Drug interactions   Noted above - continued from home  3. Hematology   H/H are WNL   4. Plan   Will order 10mg po x1 to mobilize INR    Will then resume pt's home regimen as detailed above   INR with AM labs      Education Material Provided?: No (Pt on [facility] PTA)  Pharmacist suggested discharge dosing: 10mg po x1, followed by 7.5mg Tuesdy/Saturday; 5mg AOD. Repeat INR within 72 hours of DC     Shanita Zambrano, PharmD, BCPS

## 2017-04-05 NOTE — PROGRESS NOTES
Patient transferred to [place] [id number] with all belongings.  Report called , all questions answered.  Patient stable at transport.

## 2017-04-05 NOTE — PROGRESS NOTES
Inpatient Anticoagulation Service Note    Date: 4/5/2017  Reason for Anticoagulation: Deep Vein Thrombosis        Hemoglobin Value: 11  Hematocrit Value: 34.5  Lab Platelet Value: 171  Target INR: 2.0 to 3.0    INR from last 7 days     Date/Time INR Value    04/05/17 0440 (!)1.8    04/04/17 1414 (!)1.47        Dose from last 7 days     Date/Time Dose (mg)    04/05/17 1200 5    04/04/17 2000 10        Average Dose (mg):  (7.5mg Sat/Tues; 5mg AOD per med rec)  Significant Interactions: Proton Pump Inhibitor, Buproprion    Comments: INR remains subtherapeutic but increasing following boost dose last PM. H/H decreased since admit, no bleeding reported. Resuming home dose of warfarin this PM and will follow for changes.    Plan:  Warfarin 5 mg tonight. INR in AM.  Education Material Provided?: No (Pt on [facility] PTA)  Pharmacist suggested discharge dosing: likely resume home regimen once INR in range     Mohsen Mckeon, PharmD

## 2017-04-05 NOTE — PROGRESS NOTES
Patient left the floor via bed to go to hemodialysis, accompanied by one transport staff. Patient was medicated for pain prior to leaving the floor. VSS at the time patient left the floor.

## 2017-04-05 NOTE — PROGRESS NOTES
Patient laying in the bed at the time of assessment. VSS. Patient denied pain at this time. Patient alert and oriented x 4. Bed locked and in lowest position. Callbell w/in reach of patient at all times. Bed alarm not on patient per her request. Patient educated on safety and patient verbalized understanding. Patient calls for staff assistance appropriately. Patient for hemodialysis today. Right upper arm fistula, positive for bruit and thrill. Chest port accessed. Dressing c/d/i. Plan of care discussed with patient and patient verbalized understanding. Will continue to monitor patient closely for any changes.

## 2017-04-05 NOTE — PROGRESS NOTES
Report received. Pt to unit from T& via transport.Assumed care, assessment complete. Pt is A & O x 4.  Pt reports pain at 9/10, pharmacological intervention not available at this time. Fall precautions and appropriate signs in place. Pt oriented to unit routine, call light/phone system and RN extension number provided. Pt educated regarding fall precautions. Bed alarm refused with edudcation. Pt denies any additional needs at this time. Call light within reach.

## 2017-04-05 NOTE — PROGRESS NOTES
Assumed care of patient.  Report obtained from ED nurse.  Patient with no apparent signs of distress noted.  Bed in low position. Call light and personal belongings within reach.

## 2017-04-05 NOTE — H&P
PRIMARY CARE PHYSICIAN:  Sushila Manzano MD.    CHIEF COMPLAINT:  Right flank pain and right chest pain.    HISTORY OF PRESENT ILLNESS:  She is a 36 years old female with history of   chronic pain syndrome, fibromyalgia just recently discharged from the hospital   a month ago, came to the ER with above.  Patient has similar history in the   past, admitted for intractable pain as well.  Patient has history of end-stage   renal disease and also on hemodialysis.  Per patient, she missed her   hemodialysis about a week.  When asked about the reason for it and she said   because she cannot stay in the hemodialysis chair because of the pain.    Patient was discharged with OxyContin during the last admission and patient   followed up with her rheumatology and pain specialist, Dr. Collado.  During the   follow up visit, Dr. Collado did not make any changes to her pain medications.    Patient said the reason why she came here is she cannot tolerate the pain   anymore.  During the interview, she was asking for more pain medication and   she is saying that oxycodone does not help with her medication and she want   more stronger medication for that.  I explained to her that we will continue   her outpatient medication, but we won't be increasing her outpatient   medication because of her pain, but she says she wants more pain medication   than she is taking as an outpatient.  Also, nephrology was consulted for   hemodialysis.  Because of her chest pain, she has a CT PE study done in the ER   without any significant finding, except some small right pleural effusion and   trace pericardial effusion, compared to previous CT scan, it is less.  The   patient will be admitted to the hospital for further management.  I expect her   hospital stay would be more than 2 midnights.    REVIEW OF SYSTEMS:  All other systems were reviewed and negative.  The rest   per HPI.    ALLERGY HISTORY:  ALLERGIC TO MORPHINE AND SEASONAL HAY  FEVER.    PAST MEDICAL HISTORY:  Lupus nephritis, end-stage renal disease, on   hemodialysis Monday, Wednesday, and Friday; fibromyalgia, essential   hypertension, chronic pain syndrome.    SOCIAL HISTORY:  Denies smoking, alcohol drinking or illicit drug abuse.    FAMILY HISTORY:  No pertinent family history.    OUTPATIENT MEDICATIONS:  Omeprazole 20 mg daily, bupropion 150 mg daily,   pregabalin 150 mg t.i.d., warfarin 5 mg on the rest of the week, except 7.5 mg   on Saturday, Tuesday; lidocaine patch, oxycodone 20 mg tablet q. 4 hours   p.r.n., promethazine, hydroxychloroquine 200 mg at bedtime, trazodone 50 mg at   bedtime, Zanaflex 8 mg at bedtime.    PAST SURGICAL HISTORY:  No pertinent surgical history.    PHYSICAL EXAMINATION:  VITAL SIGNS:  Temperature 99.4, pulse rate 77, respiratory rate 16, blood   pressure 144/94, satting 97% on room air.  GENERAL:  Alert, communicative.  HEENT:  Normocephalic, atraumatic.  NECK:  Supple.  No neck gland enlargement.  CARDIOVASCULAR:  Normal first and second sound.  No murmur.  RESPIRATORY:  Normal respiratory effort.  No wheezing.  ABDOMEN:  Soft, bowel sounds present.  Tenderness on palpation in the right   flank area noticed.  No guarding, no rebound.  CENTRAL NERVOUS SYSTEM:  Cranial nerves II-XII intact.  No neurological   deficit.  EXTREMITIES:  No edema, clubbing, or cyanosis.  PSYCHIATRIC:  Normal affect and mood.  Alert and oriented x4.  SKIN:  Warm and moist.    LABORATORY DATA:  CBC within normal limits.  Chem panel:  Glucose 128, BUN 52,   creatinine 7.97.  CT PE study, no PE, trace right pleural effusion and   pericardial effusions, less compared to the prior imaging.    ASSESSMENT AND PLAN:  1.  End-stage renal disease, on hemodialysis Monday, Wednesday, and Friday.    Nephrology has been consulted.  Patient will get hemodialysis, likely today.  2.  History of chronic pain syndrome, fibromyalgia.  Patient has drug seeking   behavior and during the  interview, she asked for more, more pain medication to   control her right flank pain.  Patient denied any dysuria, frequency, or   urgency to urinate.  I explained to her in detail that I will continue her   outpatient pain medications, but I will not be giving her more pain medication   for now unless it is really necessary.  3.  Essential hypertension.  Patient is hypertensive in ER.  We will continue   outpatient blood pressure medication for now.  4.  History of systemic lupus erythematosus with lupus nephritis or end-stage   renal disease.  Patient sees Dr. Collado as an outpatient.  5.  History of arteriovenous fistula thrombosis.  Continue warfarin.  INR was   subtherapeutic at 1.47.  We will check daily INR and titrate warfarin as   needed.  6.  Deep venous thrombosis prophylaxis, on warfarin.  7.  Patient is a full code.       ____________________________________     MD JAGJIT DIANA / ISSAC    DD:  04/04/2017 20:22:16  DT:  04/04/2017 22:09:50    D#:  [id number]  Job#:  [id number]

## 2017-04-05 NOTE — PROGRESS NOTES
"Hospital Medicine Interval Note  Date of Service:  4/5/2017    Chief Complaint  36 y.o.-year-old female admitted 4/4/2017 with non compliance to dialysis regimen and chest wall pain.    Interval Problem Update  CT PE noted to be negative no acute findings.  Patient tearful and asking for treatment of her right chest wall pain, saying current regimen is not effective. Saying she's not been going to dialysis due to pain issues though through chart review non compliance with dialysis has been an ongoing issue.  Discussed with RN, patient sleeping through the day, complains of pain when wakened.  Chart reviewed, on prior admissions patient has been known to display narcotic seeking behavior and has left AMA when denied IV pain medication.    Consultants/Specialty  Nephrology    Disposition  Home.     Review of Systems   Constitutional: Positive for malaise/fatigue.   Cardiovascular: Positive for chest pain. Negative for palpitations, orthopnea, claudication, leg swelling and PND.   Gastrointestinal: Positive for nausea. Negative for vomiting.   Neurological: Positive for weakness.   Psychiatric/Behavioral: Positive for depression. The patient is nervous/anxious.       Physical Exam Laboratory/Imaging   Filed Vitals:    04/04/17 2100 04/04/17 2340 04/05/17 0400 04/05/17 0800   BP: 158/93 166/97 144/80 154/101   Pulse: 87 94 74 77   Temp: 36.8 °C (98.2 °F) 37.4 °C (99.4 °F) 36.9 °C (98.4 °F) 36.7 °C (98.1 °F)   Resp: 18 18 16 18   Height: 1.651 m (5' 5\")      Weight: 76.3 kg (168 lb 3.4 oz) 80 kg (176 lb 5.9 oz)     SpO2: 96% 96% 97% 99%     Physical Exam   Constitutional: She appears well-developed and well-nourished. No distress.   HENT:   Nose: Nose normal.   Mouth/Throat: Oropharynx is clear and moist. No oropharyngeal exudate.   Eyes: Conjunctivae are normal. Right eye exhibits no discharge. Left eye exhibits no discharge. No scleral icterus.   Neck: No JVD present. No tracheal deviation present.   Cardiovascular: " Normal rate, regular rhythm and normal heart sounds.    Pulmonary/Chest: Effort normal and breath sounds normal. No stridor. No respiratory distress. She has no wheezes. She has no rales. She exhibits no tenderness.   Abdominal: Soft. Bowel sounds are normal. She exhibits no distension. There is no tenderness.   Musculoskeletal: She exhibits no edema or tenderness.   Neurological: She is alert. No cranial nerve deficit. She exhibits normal muscle tone.   Skin: Skin is warm and dry. She is not diaphoretic. No pallor.   Psychiatric: Her speech is normal and behavior is normal. Her mood appears anxious.   Nursing note and vitals reviewed.   Lab Results   Component Value Date/Time    WBC 5.2 04/05/2017 04:40 AM    HEMOGLOBIN 11.0* 04/05/2017 04:40 AM    HEMATOCRIT 34.5* 04/05/2017 04:40 AM    PLATELET COUNT 171 04/05/2017 04:40 AM     Lab Results   Component Value Date/Time    SODIUM 136 04/05/2017 04:40 AM    POTASSIUM 4.6 04/05/2017 04:40 AM    CHLORIDE 109 04/05/2017 04:40 AM    CO2 19* 04/05/2017 04:40 AM    GLUCOSE 106* 04/05/2017 04:40 AM    BUN 53* 04/05/2017 04:40 AM    CREATININE 7.74* 04/05/2017 04:40 AM      Assessment/Plan    HTN (hypertension)  Assessment & Plan  Prn hydralazine po as needed.    ESRD (end stage renal disease) on dialysis (CMS-HCC)  Assessment & Plan  Missed dialysis x 1 week, Dr. Najjar consulted arranging for dialysis.    History of lupus nephritis  Assessment & Plan  Causing renal failure with need for dialysis chronically.    Hemorrhagic disorder due to extrinsic circulating anticoagulants (CMS-HCC)  Assessment & Plan  Sub therapeutic INR on admit, warfarin per pharmacy protocol  Strongly suspect non compliance    Chest pain  Assessment & Plan  CT PE study negative  No objective findings suggesting pathology.    Fibromyalgia  Assessment & Plan  Causing chronic pain and narcotic dependence.  Continue home pain regimen.  Patient informed on admission that no additional pain medication will  be added to her regimen here.    Narcotic dependence (CMS-HCC)  Assessment & Plan  Continue home regimen of pain medication.     Labs reviewed, Medications reviewed and Radiology images reviewed  Taylor catheter: No Taylor

## 2017-04-06 ENCOUNTER — PATIENT OUTREACH (OUTPATIENT)
Dept: HEALTH INFORMATION MANAGEMENT | Facility: OTHER | Age: 37
End: 2017-04-06

## 2017-04-06 VITALS
WEIGHT: 176.37 LBS | TEMPERATURE: 98.5 F | SYSTOLIC BLOOD PRESSURE: 104 MMHG | BODY MASS INDEX: 29.38 KG/M2 | HEART RATE: 93 BPM | HEIGHT: 65 IN | DIASTOLIC BLOOD PRESSURE: 71 MMHG | OXYGEN SATURATION: 95 % | RESPIRATION RATE: 18 BRPM

## 2017-04-06 LAB
INR PPP: 2.03 (ref 0.87–1.13)
PROTHROMBIN TIME: 23.6 SEC (ref 12–14.6)

## 2017-04-06 PROCEDURE — 99239 HOSP IP/OBS DSCHRG MGMT >30: CPT | Performed by: HOSPITALIST

## 2017-04-06 PROCEDURE — A9270 NON-COVERED ITEM OR SERVICE: HCPCS | Performed by: INTERNAL MEDICINE

## 2017-04-06 PROCEDURE — 700102 HCHG RX REV CODE 250 W/ 637 OVERRIDE(OP): Performed by: INTERNAL MEDICINE

## 2017-04-06 PROCEDURE — 700111 HCHG RX REV CODE 636 W/ 250 OVERRIDE (IP): Performed by: INTERNAL MEDICINE

## 2017-04-06 PROCEDURE — 85610 PROTHROMBIN TIME: CPT

## 2017-04-06 RX ORDER — TIZANIDINE 4 MG/1
8 TABLET ORAL EVERY 6 HOURS PRN
Status: DISCONTINUED | OUTPATIENT
Start: 2017-04-06 | End: 2017-04-06 | Stop reason: HOSPADM

## 2017-04-06 RX ORDER — LIDOCAINE 50 MG/G
1 PATCH TOPICAL EVERY 24 HOURS
Status: DISCONTINUED | OUTPATIENT
Start: 2017-04-07 | End: 2017-04-06 | Stop reason: HOSPADM

## 2017-04-06 RX ADMIN — MULTIPLE VITAMINS W/ MINERALS TAB 1 TABLET: TAB at 07:45

## 2017-04-06 RX ADMIN — Medication 667 MG: at 07:45

## 2017-04-06 RX ADMIN — HEPARIN 500 UNITS: 100 SYRINGE at 11:27

## 2017-04-06 RX ADMIN — VITAMIN D, TAB 1000IU (100/BT) 1000 UNITS: 25 TAB at 07:44

## 2017-04-06 RX ADMIN — OXYCODONE HYDROCHLORIDE 20 MG: 10 TABLET ORAL at 05:16

## 2017-04-06 RX ADMIN — OMEPRAZOLE 20 MG: 20 CAPSULE, DELAYED RELEASE ORAL at 07:45

## 2017-04-06 RX ADMIN — PROMETHAZINE HYDROCHLORIDE 25 MG: 25 TABLET ORAL at 05:18

## 2017-04-06 RX ADMIN — STANDARDIZED SENNA CONCENTRATE AND DOCUSATE SODIUM 2 TABLET: 8.6; 5 TABLET, FILM COATED ORAL at 07:44

## 2017-04-06 RX ADMIN — PREGABALIN 150 MG: 150 CAPSULE ORAL at 07:45

## 2017-04-06 RX ADMIN — OXYCODONE HYDROCHLORIDE AND ACETAMINOPHEN 500 MG: 500 TABLET ORAL at 07:45

## 2017-04-06 RX ADMIN — OXYCODONE HYDROCHLORIDE 20 MG: 10 TABLET ORAL at 09:35

## 2017-04-06 RX ADMIN — ONDANSETRON 4 MG: 4 TABLET, ORALLY DISINTEGRATING ORAL at 07:42

## 2017-04-06 RX ADMIN — BUPROPION HYDROCHLORIDE 150 MG: 150 TABLET, FILM COATED, EXTENDED RELEASE ORAL at 09:36

## 2017-04-06 RX ADMIN — Medication 667 MG: at 11:26

## 2017-04-06 ASSESSMENT — ENCOUNTER SYMPTOMS
BACK PAIN: 1
CHILLS: 0
NAUSEA: 0
VOMITING: 0
FEVER: 0

## 2017-04-06 ASSESSMENT — PAIN SCALES - GENERAL
PAINLEVEL_OUTOF10: 9
PAINLEVEL_OUTOF10: 4
PAINLEVEL_OUTOF10: 4
PAINLEVEL_OUTOF10: 8

## 2017-04-06 NOTE — PROGRESS NOTES
Patient to be discharged home, no new scripts. AVS printed and signed copy in patient chart. Patient has follow up with dialysis tomorrow. RN to push patient to exit to wait for friend.

## 2017-04-06 NOTE — DISCHARGE INSTRUCTIONS
Discharge Instructions    Discharged to home by car with friend. Discharged via wheelchair, hospital escort: Yes.  Special equipment needed: Not Applicable    Be sure to schedule a follow-up appointment with your primary care doctor or any specialists as instructed.     Discharge Plan:   Influenza Vaccine Indication: Patient Refuses    I understand that a diet low in cholesterol, fat, and sodium is recommended for good health. Unless I have been given specific instructions below for another diet, I accept this instruction as my diet prescription.   Other diet: Renal diet      Special Instructions: None    · Is patient discharged on Warfarin / Coumadin?   Yes    You are receiving the drug warfarin. Please understand the importance of monitoring warfarin with scheduled PT/INR blood draws.  Follow-up with a call to your personal Doctor's office in 3 days to schedule a PT/INR. .    IMPORTANT: HOW TO USE THIS INFORMATION:  This is a summary and does NOT have all possible information about this product. This information does not assure that this product is safe, effective, or appropriate for you. This information is not individual medical advice and does not substitute for the advice of your health care professional. Always ask your health care professional for complete information about this product and your specific health needs.      WARFARIN - ORAL (WARF-uh-rin)      COMMON BRAND NAME(S): Coumadin      WARNING:  Warfarin can cause very serious (possibly fatal) bleeding. This is more likely to occur when you first start taking this medication or if you take too much warfarin. To decrease your risk for bleeding, your doctor or other health care provider will monitor you closely and check your lab results (INR test) to make sure you are not taking too much warfarin. Keep all medical and laboratory appointments. Tell your doctor right away if you notice any signs of serious bleeding. See also Side Effects section.     "  USES:  This medication is used to treat blood clots (such as in deep vein thrombosis-DVT or pulmonary embolus-PE) and/or to prevent new clots from forming in your body. Preventing harmful blood clots helps to reduce the risk of a stroke or heart attack. Conditions that increase your risk of developing blood clots include a certain type of irregular heart rhythm (atrial fibrillation), heart valve replacement, recent heart attack, and certain surgeries (such as hip/knee replacement). Warfarin is commonly called a \"blood thinner,\" but the more correct term is \"anticoagulant.\" It helps to keep blood flowing smoothly in your body by decreasing the amount of certain substances (clotting proteins) in your blood.      HOW TO USE:  Read the Medication Guide provided by your pharmacist before you start taking warfarin and each time you get a refill. If you have any questions, ask your doctor or pharmacist. Take this medication by mouth with or without food as directed by your doctor or other health care professional, usually once a day. It is very important to take it exactly as directed. Do not increase the dose, take it more frequently, or stop using it unless directed by your doctor. Dosage is based on your medical condition, laboratory tests (such as INR), and response to treatment. Your doctor or other health care provider will monitor you closely while you are taking this medication to determine the right dose for you. Use this medication regularly to get the most benefit from it. To help you remember, take it at the same time each day. It is important to eat a balanced, consistent diet while taking warfarin. Some foods can affect how warfarin works in your body and may affect your treatment and dose. Avoid sudden large increases or decreases in your intake of foods high in vitamin K (such as broccoli, cauliflower, cabbage, brussels sprouts, kale, spinach, and other green leafy vegetables, liver, green tea, certain " vitamin supplements). If you are trying to lose weight, check with your doctor before you try to go on a diet. Cranberry products may also affect how your warfarin works. Limit the amount of cranberry juice (16 ounces/480 milliliters a day) or other cranberry products you may drink or eat.      SIDE EFFECTS:  Nausea, loss of appetite, or stomach/abdominal pain may occur. If any of these effects persist or worsen, tell your doctor or pharmacist promptly. Remember that your doctor has prescribed this medication because he or she has judged that the benefit to you is greater than the risk of side effects. Many people using this medication do not have serious side effects. This medication can cause serious bleeding if it affects your blood clotting proteins too much (shown by unusually high INR lab results). Even if your doctor stops your medication, this risk of bleeding can continue for up to a week. Tell your doctor right away if you have any signs of serious bleeding, including: unusual pain/swelling/discomfort, unusual/easy bruising, prolonged bleeding from cuts or gums, persistent/frequent nosebleeds, unusually heavy/prolonged menstrual flow, pink/dark urine, coughing up blood, vomit that is bloody or looks like coffee grounds, severe headache, dizziness/fainting, unusual or persistent tiredness/weakness, bloody/black/tarry stools, chest pain, shortness of breath, difficulty swallowing. Tell your doctor right away if any of these unlikely but serious side effects occur: persistent nausea/vomiting, severe stomach/abdominal pain, yellowing eyes/skin. This drug rarely has caused very serious (possibly fatal) problems if its effects lead to small blood clots (usually at the beginning of treatment). This can lead to severe skin/tissue damage that may require surgery or amputation if left untreated. Patients with certain blood conditions (protein C or S deficiency) may be at greater risk. Get medical help right away if  any of these rare but serious side effects occur: painful/red/purplish patches on the skin (such as on the toe, breast, abdomen), change in the amount of urine, vision changes, confusion, slurred speech, weakness on one side of the body. A very serious allergic reaction to this drug is rare. However, get medical help right away if you notice any symptoms of a serious allergic reaction, including: rash, itching/swelling (especially of the face/tongue/throat), severe dizziness, trouble breathing. This is not a complete list of possible side effects. If you notice other effects not listed above, contact your doctor or pharmacist. In the [place] - Call your doctor for medical advice about side effects. You may report side effects to FDA at 5-946-VYZ-1142. In [place] - Call your doctor for medical advice about side effects. You may report side effects to [facility] [place] at [phone].      PRECAUTIONS:  Before taking warfarin, tell your doctor or pharmacist if you are allergic to it; or if you have any other allergies. This product may contain inactive ingredients, which can cause allergic reactions or other problems. Talk to your pharmacist for more details. Before using this medication, tell your doctor or pharmacist your medical history, especially of: blood disorders (such as anemia, hemophilia), bleeding problems (such as bleeding of the stomach/intestines, bleeding in the brain), blood vessel disorders (such as aneurysms), recent major injury/surgery, liver disease, alcohol use, mental/mood disorders (including memory problems), frequent falls/injuries. It is important that all your doctors and dentists know that you take warfarin. Before having surgery or any medical/dental procedures, tell your doctor or dentist that you are taking this medication and about all the products you use (including prescription drugs, nonprescription drugs, and herbal products). Avoid getting injections into the muscles. If you must have  an injection into a muscle (for example, a flu shot), it should be given in the arm. This way, it will be easier to check for bleeding and/or apply pressure bandages. This medication may cause stomach bleeding. Daily use of alcohol while using this medicine will increase your risk for stomach bleeding and may also affect how this medication works. Limit or avoid alcoholic beverages. If you have not been eating well, if you have an illness or infection that causes fever, vomiting, or diarrhea for more than 2 days, or if you start using any antibiotic medications, contact your doctor or pharmacist immediately because these conditions can affect how warfarin works. This medication can cause heavy bleeding. To lower the chance of getting cut, bruised, or injured, use great caution with sharp objects like safety razors and nail cutters. Use an electric razor when shaving and a soft toothbrush when brushing your teeth. Avoid activities such as contact sports. If you fall or injure yourself, especially if you hit your head, call your doctor immediately. Your doctor may need to check you. The Food & Drug Administration has stated that generic warfarin products are interchangeable. However, consult your doctor or pharmacist before switching warfarin products. Be careful not to take more than one medication that contains warfarin unless specifically directed by the doctor or health care provider who is monitoring your warfarin treatment. Older adults may be at greater risk for bleeding while using this drug. This medication is not recommended for use during pregnancy because of serious (possibly fatal) harm to an unborn baby. Discuss the use of reliable forms of birth control with your doctor. If you become pregnant or think you may be pregnant, tell your doctor immediately. If you are planning pregnancy, discuss a plan for managing your condition with your doctor before you become pregnant. Your doctor may switch the type of  "medication you use during pregnancy. Very small amounts of this medication may pass into breast milk but is unlikely to harm a nursing infant. Consult your doctor before breast-feeding.      DRUG INTERACTIONS:  Drug interactions may change how your medications work or increase your risk for serious side effects. This document does not contain all possible drug interactions. Keep a list of all the products you use (including prescription/nonprescription drugs and herbal products) and share it with your doctor and pharmacist. Do not start, stop, or change the dosage of any medicines without your doctor's approval. Warfarin interacts with many prescription, nonprescription, vitamin, and herbal products. This includes medications that are applied to the skin or inside the vagina or rectum. The interactions with warfarin usually result in an increase or decrease in the \"blood-thinning\" (anticoagulant) effect. Your doctor or other health care professional should closely monitor you to prevent serious bleeding or clotting problems. While taking warfarin, it is very important to tell your doctor or pharmacist of any changes in medications, vitamins, or herbal products that you are taking. Some products that may interact with this drug include: capecitabine, imatinib, mifepristone. Aspirin, aspirin-like drugs (salicylates), and nonsteroidal anti-inflammatory drugs (NSAIDs such as ibuprofen, naproxen, celecoxib) may have effects similar to warfarin. These drugs may increase the risk of bleeding problems if taken during treatment with warfarin. Carefully check all prescription/nonprescription product labels (including drugs applied to the skin such as pain-relieving creams) since the products may contain NSAIDs or salicylates. Talk to your doctor about using a different medication (such as acetaminophen) to treat pain/fever. Low-dose aspirin and related drugs (such as clopidogrel, ticlopidine) should be continued if prescribed " by your doctor for specific medical reasons such as heart attack or stroke prevention. Consult your doctor or pharmacist for more details. Many herbal products interact with warfarin. Tell your doctor before taking any herbal products, especially bromelains, coenzyme Q10, cranberry, danshen, dong quai, fenugreek, garlic, ginkgo biloba, ginseng, and Duane's wort, among others. This medication may interfere with a certain laboratory test to measure theophylline levels, possibly causing false test results. Make sure laboratory personnel and all your doctors know you use this drug.      OVERDOSE:  If overdose is suspected, contact a poison control center or emergency room immediately. [place] residents can call the  National Poison Hotline at 1-156.230.3564. [place] residents can call a provincial poison control center. Symptoms of overdose may include: bloody/black/tarry stools, pink/dark urine, unusual/prolonged bleeding.      NOTES:  Do not share this medication with others. Laboratory and/or medical tests (such as INR, complete blood count) must be performed periodically to monitor your progress or check for side effects. Consult your doctor for more details.      MISSED DOSE:  For the best possible benefit, do not miss any doses. If you do miss a dose and remember on the same day, take it as soon as you remember. If you remember on the next day, skip the missed dose and resume your usual dosing schedule. Do not double the dose to catch up because this could increase your risk for bleeding. Keep a record of missed doses to give to your doctor or pharmacist. Contact your doctor or pharmacist if you miss 2 or more doses in a row.      STORAGE:  Store at room temperature away from light and moisture. Do not store in the bathroom. Keep all medications away from children and pets. Do not flush medications down the toilet or pour them into a drain unless instructed to do so. Properly discard this product when it is   or no longer needed. Consult your pharmacist or local waste disposal company for more details about how to safely discard your product.      MEDICAL ALERT:  Your condition and medication can cause complications in a medical emergency. For information about enrolling in MedicAlert, call 1-621.629.9514 ([place]) or 1-651.987.8615 ([place]).      Information last revised October 2010 Copyright(c) 2010 [employer]             · Is patient Post Blood Transfusion?  No    Depression / Suicide Risk    As you are discharged from this [facility] facility, it is important to learn how to keep safe from harming yourself.    Recognize the warning signs:  · Abrupt changes in personality, positive or negative- including increase in energy   · Giving away possessions  · Change in eating patterns- significant weight changes-  positive or negative  · Change in sleeping patterns- unable to sleep or sleeping all the time   · Unwillingness or inability to communicate  · Depression  · Unusual sadness, discouragement and loneliness  · Talk of wanting to die  · Neglect of personal appearance   · Rebelliousness- reckless behavior  · Withdrawal from people/activities they love  · Confusion- inability to concentrate     If you or a loved one observes any of these behaviors or has concerns about self-harm, here's what you can do:  · Talk about it- your feelings and reasons for harming yourself  · Remove any means that you might use to hurt yourself (examples: pills, rope, extension cords, firearm)  · Get professional help from the community (Mental Health, Substance Abuse, psychological counseling)  · Do not be alone:Call your Safe Contact- someone whom you trust who will be there for you.  · Call your local CRISIS HOTLINE 516-0049 or 959-721-8021  · Call your local Children's Mobile Crisis Response Team [place] (921) 766-1736 or www.[employer]  · Call the toll free National Suicide Prevention Hotlines   · National Suicide  Prevention Lifeline 630-943-JZCV (7976)  · National [place] Line Network 800-SUICIDE (690-7477)

## 2017-04-06 NOTE — CARE PLAN
Problem: Venous Thromboembolism (VTW)/Deep Vein Thrombosis (DVT) Prevention:  Goal: Patient will participate in Venous Thrombosis (VTE)/Deep Vein Thrombosis (DVT)Prevention Measures  Pt refuses SCD's with education, will encourage use.     Problem: Pain Management  Goal: Pain level will decrease to patient’s comfort goal  Pt reports chronic pain, pt medicated per MAR and offered heat pack.

## 2017-04-06 NOTE — PROGRESS NOTES
Pt dialyzed for 3 hrs today from 1354 to 1654.  Net UF 2.0L.  Hemostasis achieved w/in 10 mins and dsgs applied to access sites.  Pt tolerated tx well.  See dialysis flow sheet for details.  Report called to Valencia Rees RN .  Waited 1 hr for transport dept.  pre tx.

## 2017-04-06 NOTE — CONSULTS
DATE OF SERVICE:  04/05/2017    REQUESTING PHYSICIAN:  Dr. Purcell.    REASON FOR CONSULTATION:  Management of respiratory failure in the setting of   end-stage renal disease assessing the need for urgent dialysis.    The patient seen and examined, medical records reviewed.    HISTORY OF PRESENT ILLNESS:  The patient is an unfortunate 36-year-old lady   with a past medical history significant for SLE, history of end-stage renal   disease, undergoes hemodialysis on Monday, Wednesday, and Friday; however, she   has missed last 3 dialysis treatments because the patient said she was tired,   she could not make it to the dialysis clinic.  The patient presented to the   hospital with right flank pain, also some shortness of breath and she has no   coughing, no fever, no hemoptysis.  She is on chronic pain management for her   generalized pain and lupus.    Patient has no nausea, no vomiting, no hematuria, no dysuria.    We were called to manage her kidney disease assessing the need for urgent   dialysis for her uremia as well as volume overload.    PAST MEDICAL HISTORY:  Significant for,  1.  Lupus.  2.  End-stage renal disease.  3.  Fibromyalgia.  4.  Hypertension.    SOCIAL HISTORY:  Patient lives by herself.  She has no smoking or alcohol use.    FAMILY HISTORY:  No known renal disease.    MEDICATIONS:  Reviewed.    REVIEW OF SYSTEMS:  Patient is very fatigued.  She might be borderline   depressed, all other review of systems for total of 10 were negative except   outlined in the history of present illness.    PHYSICAL EXAMINATION:  GENERAL:  Patient is in no apparent distress.  VITAL SIGNS:  Show blood pressure 154/100, heart rate was 83.  HEENT:  Normocephalic, atraumatic.  Sclerae anicteric.  NECK:  No lymphadenopathy.  CHEST:  Normal.  LUNGS:  Few rales at the bases.  HEART:  S1, S2.  ABDOMEN:  Soft.  EXTREMITIES:  There is +1 edema.  SKIN:  No skin rashes.  NEUROLOGIC:  The patient is alert and oriented  x3.    LABORATORY DATA AND IMAGING:  Recent labs were reviewed.  Also, I did review   the chest x-ray, which showed mild pulmonary congestion.    ASSESSMENT:  1.  End-stage renal disease.  2.  Pulmonary congestion.  3.  Volume overload.  4.  Metabolic acidosis.  5.  Anemia.  6.  Uncontrolled hypertension.    PLAN:  1.  We will plan on urgent dialysis to manage her uremia as well as volume   overload.  2.  Use erythropoietin with dialysis.  3.  Renal dose all medications.  4.  Avoid nephrotoxin.  5.  Renal diet.  6.  I had a long discussion with the patient about exploring the possibility   of peritoneal dialysis to improve her compliance, patient is open to the idea   and we will consult surgery for hemodialysis.    Plan discussed in detail with the primary team.       ____________________________________     FADI NAJJAR, MD FN / ISSAC    DD:  04/05/2017 14:04:47  DT:  04/05/2017 18:22:09    D#:  [id number]  Job#:  [id number]

## 2017-04-06 NOTE — PROGRESS NOTES
Hospital Medicine Progress Note, Adult, Complex               Author: Gama Najjar Date & Time created: 4/6/2017  12:26 PM     Interval History:  Pt with ESRD, medical non compliance, presented with weakness, SOB,had HD yesterday.  Refusing HD today     Review of Systems:  Review of Systems   Constitutional: Positive for malaise/fatigue. Negative for fever and chills.   Cardiovascular: Negative for chest pain and leg swelling.   Gastrointestinal: Negative for nausea and vomiting.   Genitourinary: Negative for dysuria and urgency.   Musculoskeletal: Positive for back pain.       Physical Exam:  Physical Exam   Constitutional: She is oriented to person, place, and time. She appears well-developed and well-nourished. No distress.   HENT:   Head: Normocephalic and atraumatic.   Nose: Nose normal.   Eyes: Right eye exhibits no discharge. Left eye exhibits no discharge. No scleral icterus.   Cardiovascular: Normal rate and regular rhythm.    No murmur heard.  Pulmonary/Chest: Effort normal. She has no rales.   Musculoskeletal: She exhibits no edema.   Neurological: She is alert and oriented to person, place, and time.   Skin: She is not diaphoretic.   Vitals reviewed.      Labs:        Invalid input(s): [id number]  Recent Labs      04/04/17   1414 04/04/17   1423   TROPONINI  <0.01   --    BNPBTYPENAT   --   262*     Recent Labs      04/04/17   1423  04/05/17   0440   SODIUM  135  136   POTASSIUM  4.5  4.6   CHLORIDE  110  109   CO2  15*  19*   BUN  52*  53*   CREATININE  7.97*  7.74*   CALCIUM  9.1  8.3*     Recent Labs      04/04/17   1414 04/04/17   1423  04/05/17   0440   ALTSGPT   --   7  <5   ASTSGOT   --   11*  8*   ALKPHOSPHAT   --   82  67   TBILIRUBIN   --   0.4  0.3   LIPASE  17   --    --    GLUCOSE   --   128*  106*     Recent Labs      04/04/17   1414 04/04/17   1423  04/05/17   0440  04/06/17   0943   RBC   --   4.05*  3.54*   --    HEMOGLOBIN   --   12.6  11.0*   --    HEMATOCRIT   --   38.8  34.5*    --    PLATELETCT   --   187  171   --    PROTHROMBTM  18.3*   --   21.4*  23.6*   APTT  39.4*   --    --    --    INR  1.47*   --   1.80*  2.03*     Recent Labs      17   1423  17   0440   WBC  7.1  5.2   NEUTSPOLYS  70.40   --    LYMPHOCYTES  20.80*   --    MONOCYTES  6.20   --    EOSINOPHILS  1.30   --    BASOPHILS  1.00   --    ASTSGOT  11*  8*   ALTSGPT  7  <5   ALKPHOSPHAT  82  67   TBILIRUBIN  0.4  0.3           Hemodynamics:  Temp (24hrs), Av.1 °C (98.7 °F), Min:36.7 °C (98 °F), Max:37.3 °C (99.1 °F)  Temperature: 36.9 °C (98.5 °F)  Pulse  Av.6  Min: 74  Max: 120   Blood Pressure: 104/71 mmHg     Respiratory:    Respiration: 18, Pulse Oximetry: 95 %        RUL Breath Sounds: Diminished, RML Breath Sounds: Diminished, RLL Breath Sounds: Diminished, LASHANDA Breath Sounds: Diminished, LLL Breath Sounds: Diminished  Fluids:    Intake/Output Summary (Last 24 hours) at 17 1226  Last data filed at 17 1800   Gross per 24 hour   Intake    740 ml   Output   2500 ml   Net  -1760 ml        GI/Nutrition:  Orders Placed This Encounter   Procedures   • Diet Order     Standing Status: Standing      Number of Occurrences: 1      Standing Expiration Date:      Order Specific Question:  Diet:     Answer:  Renal [8]   • DISCONTINUE DIET TRAY     Standing Status: Standing      Number of Occurrences: 1      Standing Expiration Date:      Medical Decision Making, by Problem:  Active Hospital Problems    Diagnosis   • SLE [M32.9]   • HTN (hypertension) [I10]   • Narcotic dependence (CMS-HCC) [F11.20]   • Fibromyalgia [M79.7]   • Chest pain [R07.9]   • 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

## 2017-04-06 NOTE — PROGRESS NOTES
Patient expressed to RN that she feels she is not getting treatment here any different than the treatment she can give herself at home so she doesn't know why she is here. She stated she feels like the doctors aren't doing anything for her here. Will pass this information on to the night RN and charge nurse was notified as well. Assured patient that follow-up will be done.

## 2017-04-06 NOTE — DISCHARGE SUMMARY
CHIEF COMPLAINT ON ADMISSION  Chief Complaint   Patient presents with   • Shortness of Breath     pt reports that she needs a thoracentesis. has renal failure, lupus and fibromyalgia. pt states that she is unable to eat or sleep causing her fibromyalgia extreme pain. pt has not had dialysis x 1 week.       CODE STATUS  FULL CODE    HPI & HOSPITAL COURSE  This is a 36 y.o. year old female here with non compliance to dialysis regimen, concerns about pleural effusion and chest wall pain. Workup including CT of the chest with PE protocol revealed no significant pleural effusion or pathology to explain her pain. Sedimentation rate was 41 not consistent with acute lupus flare. The patient has a history of seeking IV pain medications and did ask for these while admitted. I provided her with her home regimen of oxycodone 10-20mg po every four hours as needed. She is also on lyrica and muscle relaxants as well. Nephrology met with the patient and discussed strategies to improve her compliance with dialysis.      Therefore, she is discharged in good and stable condition with close outpatient follow-up.    SPECIFIC OUTPATIENT FOLLOW-UP  Dr. Collado, rheumatology for follow up on lupus management.    DISCHARGE PROBLEM LIST  Active Problems:    HTN (hypertension) POA: Yes    SLE POA: Yes    Fibromyalgia POA: Yes    Narcotic dependence (CMS-[facility]) POA: Yes    ESRD (end stage renal disease) on dialysis (CMS-[facility]) POA: Yes    Medical non-compliance POA: Yes    History of lupus nephritis POA: Yes    Hemorrhagic disorder due to extrinsic circulating anticoagulants (CMS-[facility]) POA: Yes    Chest pain POA: Yes  Resolved Problems:    * No resolved hospital problems. *      FOLLOW UP  Future Appointments  Date Time Provider Department Center   4/21/2017 7:40 AM Sushila Manzano M.D. [address] Fadi Najjar, M.D.  [address]  [address]  [place] [address]  [phone]      for dialysis questions , As needed      MEDICATIONS ON  DISCHARGE   Debby Carrizales   Home Medication Instructions CONI:[id number]    Printed on:04/06/17 0204   Medication Information                      ascorbic acid (ASCORBIC ACID) 500 MG Tab  Take 500 mg by mouth every day.             buPROPion (WELLBUTRIN XL) 150 MG XL tablet  Take 150 mg by mouth every morning.             calcium acetate (PHOSLO) 667 MG Cap  Take 667 mg by mouth 3 times a day, with meals.             cholecalciferol (VITAMIN D3) 5000 UNIT Cap  Take 10,000 Units by mouth every day.             hydroxychloroquine (PLAQUENIL) 200 MG Tab  Take 200 mg by mouth every bedtime.             lidocaine (LIDODERM) 5 % Patch  Apply 1 Patch to skin as directed every 12 hours. Apply to mid back             omeprazole (PRILOSEC) 20 MG delayed-release capsule  Take 1 Cap by mouth every day.             Oxycodone HCl 20 MG Tab  Take 0.5-1 Tabs by mouth every four hours as needed (moderate to severe pain).             pregabalin (LYRICA) 150 MG Cap  Take 150 mg by mouth 3 times a day.             promethazine (PHENERGAN) 25 MG Tab  Take 25 mg by mouth every 6 hours as needed for Nausea/Vomiting.             therapeutic multivitamin-minerals (THERAGRAN-M) Tab  Take 1 Tab by mouth every day.             tizanidine (ZANAFLEX) 4 MG Tab  Take 2 Tabs by mouth every bedtime.             trazodone (DESYREL) 50 MG Tab  Take 1 Tab by mouth every bedtime.             warfarin (COUMADIN) 5 MG Tab  Take 5-7.5 mg by mouth See Admin Instructions. Pt takes:  5MG on Sun,Mon,Wed,Thur, and Fri  7.5MG on Sat and Tue                 DIET  No orders of the defined types were placed in this encounter.       ACTIVITY  As tolerated.      CONSULTATIONS  Nephrology, Dr. Najjar.    PROCEDURES  None.    LABORATORY  Lab Results   Component Value Date/Time    SODIUM 136 04/05/2017 04:40 AM    POTASSIUM 4.6 04/05/2017 04:40 AM    CHLORIDE 109 04/05/2017 04:40 AM    CO2 19* 04/05/2017 04:40 AM    GLUCOSE 106* 04/05/2017 04:40 AM    BUN 53*  04/05/2017 04:40 AM    CREATININE 7.74* 04/05/2017 04:40 AM        Lab Results   Component Value Date/Time    WBC 5.2 04/05/2017 04:40 AM    HEMOGLOBIN 11.0* 04/05/2017 04:40 AM    HEMATOCRIT 34.5* 04/05/2017 04:40 AM    PLATELET COUNT 171 04/05/2017 04:40 AM        Total time of the discharge process exceeds 32 minutes.

## 2017-04-06 NOTE — PROGRESS NOTES
Patient returned from dialysis via bed. Patient c/o pain. Assured patient she will be medicated for pain asap. Patient verbalized understanding. Report called from Audrey Caballero RN in dialysis. Per report, 2 liters were taken off patient. VSS. SJ=225/84; HR=86; afebrile. No complications with HD. Patient resting comfortably in the bed at this time. Monitored closely for any changes.

## 2017-04-06 NOTE — PROGRESS NOTES
Care assumed at 0700. Patient reports pain in legs. No distress noted. Pain medication given per MAR. Plan to discharge today around lunchtime. Call light in reach.

## 2017-04-06 NOTE — PROGRESS NOTES
"Assumed care at 1915.  Report received from day shift RN. Pt A&Ox4, pt is grimacing and holding head, stating \"I get headaches like this when they take too much fluid off at Dialysis\", pt declines intervention. Pt reports nausea, medicated per MAR.   Pt educated on hourly rounding and phone extension numbers. Pt board has been updated. Pt denies any additional needs at this time.  Pt refuses bed alarm with education. Call light and phone within reach.      "

## 2017-04-07 ENCOUNTER — PATIENT OUTREACH (OUTPATIENT)
Dept: HEALTH INFORMATION MANAGEMENT | Facility: OTHER | Age: 37
End: 2017-04-07

## 2017-04-07 NOTE — PROGRESS NOTES
04/07/2017 1536 - Discharge Outreach attempt - patient in dialysis - asks that I call back another time.  04/11/2017 1628 - Discharge Outreach attempt - LM

## 2017-04-21 ENCOUNTER — OFFICE VISIT (OUTPATIENT)
Dept: MEDICAL GROUP | Facility: MEDICAL CENTER | Age: 37
End: 2017-04-21
Payer: MEDICARE

## 2017-04-21 VITALS
WEIGHT: 175 LBS | TEMPERATURE: 97.9 F | RESPIRATION RATE: 14 BRPM | BODY MASS INDEX: 29.16 KG/M2 | OXYGEN SATURATION: 96 % | DIASTOLIC BLOOD PRESSURE: 76 MMHG | HEIGHT: 65 IN | HEART RATE: 72 BPM | SYSTOLIC BLOOD PRESSURE: 122 MMHG

## 2017-04-21 DIAGNOSIS — M79.7 FIBROMYALGIA: ICD-10-CM

## 2017-04-21 DIAGNOSIS — Z99.2 ESRD (END STAGE RENAL DISEASE) ON DIALYSIS (HCC): ICD-10-CM

## 2017-04-21 DIAGNOSIS — M87.051 AVASCULAR NECROSIS OF BONES OF BOTH HIPS (HCC): ICD-10-CM

## 2017-04-21 DIAGNOSIS — N18.6 ESRD (END STAGE RENAL DISEASE) ON DIALYSIS (HCC): ICD-10-CM

## 2017-04-21 DIAGNOSIS — I77.0 A-V FISTULA (HCC): ICD-10-CM

## 2017-04-21 DIAGNOSIS — M87.052 AVASCULAR NECROSIS OF BONES OF BOTH HIPS (HCC): ICD-10-CM

## 2017-04-21 DIAGNOSIS — M32.14: ICD-10-CM

## 2017-04-21 DIAGNOSIS — F11.20 NARCOTIC DEPENDENCE (HCC): ICD-10-CM

## 2017-04-21 DIAGNOSIS — R07.9 CHEST PAIN, UNSPECIFIED TYPE: ICD-10-CM

## 2017-04-21 PROBLEM — Z87.39 HISTORY OF LUPUS NEPHRITIS: Status: RESOLVED | Noted: 2017-02-06 | Resolved: 2017-04-21

## 2017-04-21 PROBLEM — D68.32 HEMORRHAGIC DISORDER DUE TO EXTRINSIC CIRCULATING ANTICOAGULANTS (HCC): Status: RESOLVED | Noted: 2017-02-07 | Resolved: 2017-04-21

## 2017-04-21 PROCEDURE — G8419 CALC BMI OUT NRM PARAM NOF/U: HCPCS | Performed by: FAMILY MEDICINE

## 2017-04-21 PROCEDURE — G8432 DEP SCR NOT DOC, RNG: HCPCS | Performed by: FAMILY MEDICINE

## 2017-04-21 PROCEDURE — 1111F DSCHRG MED/CURRENT MED MERGE: CPT | Performed by: FAMILY MEDICINE

## 2017-04-21 PROCEDURE — 99214 OFFICE O/P EST MOD 30 MIN: CPT | Performed by: FAMILY MEDICINE

## 2017-04-21 PROCEDURE — 1036F TOBACCO NON-USER: CPT | Performed by: FAMILY MEDICINE

## 2017-04-21 RX ORDER — AMLODIPINE BESYLATE 5 MG/1
5 TABLET ORAL PRN
COMMUNITY
End: 2018-03-27

## 2017-04-21 NOTE — PROGRESS NOTES
cc:  Hospital follow up    Subjective:     Debby Carrizales is a 36 y.o. female presenting for a hospital follow up.  She was hospitalized most recently for chest pain and shortness of breath after missing dialysis.  She reports that she has been missing dialysis because she has pain in her back after 3 or so hours of sitting there.  Her fistula is also very sensitive so she feels like she cannot move at all during dialysis.  After dialysis, her back pain and muscle pain from her fibromyalgia is debilitating, is very tired after.  She feels like her pain is not well managed currently.  Sees rheum (Dr Collado) for the lupus and fibromyalgia, is prescribed lyrica, lidoderm patches, oxycodone, phenergan, plaquenil, trazodone, tizanidine, and wellbutrin by her rheumatologist.  During the hospitalization, she felt the long acting oxycodone seemed to help.  Also during the hospitalization, she was told that she has a small pericardial effusion, is wondering if the chest pain is from that.  She thinks that they were going to remove the fluid, but was canceled as it was not thought safe enough to do so.    Hx of ESRD on hemodialysis MWF from lupus.  [facility] nephro (Dr Najjar), is prescribed phoslo, vit d, norvasc, and coumadin for her fistula.  Only takes norvasc if needed.  BPs tend to vary depending on dialysis and how her chronic pain is managed.  she is going to switch to peritoneal dialysis because of her fistula issues, is unsure when the switch will happen.    Review of systems:  See above.          Current outpatient prescriptions:   •  amlodipine (NORVASC) 5 MG Tab, Take 5 mg by mouth as needed., Disp: , Rfl:   •  ascorbic acid (ASCORBIC ACID) 500 MG Tab, Take 500 mg by mouth every day., Disp: , Rfl:   •  omeprazole (PRILOSEC) 20 MG delayed-release capsule, Take 1 Cap by mouth every day. (Patient taking differently: Take 20 mg by mouth 2 times a day.), Disp: 30 Cap, Rfl: 1  •  buPROPion (WELLBUTRIN XL) 150 MG  XL tablet, Take 150 mg by mouth every morning., Disp: , Rfl:   •  pregabalin (LYRICA) 150 MG Cap, Take 150 mg by mouth 3 times a day., Disp: , Rfl:   •  calcium acetate (PHOSLO) 667 MG Cap, Take 667 mg by mouth 3 times a day, with meals., Disp: , Rfl:   •  warfarin (COUMADIN) 5 MG Tab, Take 5-7.5 mg by mouth See Admin Instructions. Pt takes: 5MG on Sun,Mon,Wed,Thur, and Fri 7.5MG on Sat and Tue, Disp: , Rfl:   •  lidocaine (LIDODERM) 5 % Patch, Apply 1 Patch to skin as directed every 12 hours. Apply to mid back, Disp: , Rfl:   •  Oxycodone HCl 20 MG Tab, Take 0.5-1 Tabs by mouth every four hours as needed (moderate to severe pain). (Patient taking differently: Take 20 mg by mouth every 6 hours as needed (moderate to severe pain).), Disp: 20 Tab, Rfl: 0  •  promethazine (PHENERGAN) 25 MG Tab, Take 25 mg by mouth every 6 hours as needed for Nausea/Vomiting., Disp: , Rfl:   •  hydroxychloroquine (PLAQUENIL) 200 MG Tab, Take 200 mg by mouth every bedtime., Disp: , Rfl:   •  cholecalciferol (VITAMIN D3) 5000 UNIT Cap, Take 10,000 Units by mouth every day., Disp: , Rfl:   •  trazodone (DESYREL) 50 MG Tab, Take 1 Tab by mouth every bedtime., Disp: 30 Tab, Rfl: 0  •  therapeutic multivitamin-minerals (THERAGRAN-M) Tab, Take 1 Tab by mouth every day., Disp: 30 Tab, Rfl:   •  tizanidine (ZANAFLEX) 4 MG Tab, Take 2 Tabs by mouth every bedtime., Disp: 60 Tab, Rfl: 0    Allergies   Allergen Reactions   • Morphine Itching     Tolerates Dilaudid   • Seasonal Runny Nose and Itching     Hay fever, sabiha brush       Past Medical History   Diagnosis Date   • Lupus (CMS-HCC)    • Fibromyalgia    • Hypertension    • Backpain    • Avascular necrosis    • Renal failure      stage 4 per pt   • Arthritis      all joints,r/t lupus   • Psychiatric disorder      anxiety, depression   • UTI (urinary tract infection) 4/4/2013   • Clostridium difficile colitis 5/3/2011   • Pneumonia    • Dialysis patient      Past Surgical History    Procedure Laterality Date   • Gastroscopy-endo  4/10/2011     Performed by SYED JURADO at ENDOSCOPY [facility]   • Sclerotheraphy  4/10/2011     Performed by SYED JURADO at ENDOSCOPY [facility]   • Gastroscopy-endo  4/18/2011     Performed by ALCIRA CRUZ at [facility]   • Colonoscopy with biopsy  4/20/2011     Performed by ALCIRA CRUZ at ENDOSCOPY [facility]   • Gastroscopy-endo  4/27/2011     Performed by PALMIRA DOAN at ENDOSCOPY [facility]   • Other  5/2011     tracheostomy   • Other abdominal surgery       kidney biopsy   • Av fistula creation  9/9/2014     Performed by Shabbir Ardon M.D. at SURGERY [facility]   • Cath placement  9/9/2014     Performed by Shabbir Ardon M.D. at SURGERY [facility]   • Av fistula creation  11/14/2014     Performed by Shabbir Ardon M.D. at SURGERY [facility]   • Av fistula creation  2/3/2015     Performed by Shabbir Ardon M.D. at SURGERY [facility]   • Hip arthroplasty total Right 1/18/2016     Procedure: HIP ARTHROPLASTY TOTAL;  Surgeon: Michael Holman M.D.;  Location: [facility];  Service:    • Closed reduction Right 7/5/2016     Procedure: CLOSED REDUCTION- Hip ;  Surgeon: Michael Holman M.D.;  Location: [facility];  Service:    • Av fistula creation Right 7/12/2016     Procedure: AV FISTULA CREATION WITH GRAFT BRACHIAL AXILLARY;  Surgeon: Shabbir Ardon M.D.;  Location: SURGERY [facility];  Service:    • Thrombectomy Right 8/20/2016     Procedure: THROMBECTOMY AV GRAFT;  Surgeon: Shabbir Ardon M.D.;  Location: [facility];  Service:    • Thrombectomy Right 8/21/2016     Procedure: THROMBECTOMY - right AV fistula graft with grams;  Surgeon: Shabbir Ardon M.D.;  Location: [facility];  Service:    • Angioplasty balloon  8/21/2016     Procedure: ANGIOPLASTY BALLOON;  Surgeon: Shabbir Ardon M.D.;   "Location: SURGERY [facility];  Service:    • Tracheostomy       History reviewed. No pertinent family history.  Social History     Social History   • Marital Status: Single     Spouse Name: N/A   • Number of Children: N/A   • Years of Education: N/A     Occupational History   • Not on file.     Social History Main Topics   • Smoking status: Former Smoker -- 0.50 packs/day for 18 years     Types: Cigarettes     Quit date: 06/13/2011   • Smokeless tobacco: Never Used      Comment: 1/2 ppd   • Alcohol Use: No      Comment: occ   • Drug Use: No   • Sexual Activity: Not on file     Other Topics Concern   • Not on file     Social History Narrative       Objective:     Vitals: /76 mmHg  Pulse 72  Temp(Src) 36.6 °C (97.9 °F)  Resp 14  Ht 1.651 m (5' 5\")  Wt 79.379 kg (175 lb)  BMI 29.12 kg/m2  SpO2 96%  LMP 02/24/2017 (Approximate)  General: Alert, pleasant, NAD  HEENT: Normocephalic.   Psych:  Affect/mood is normal, judgement is good, memory is intact, grooming is appropriate.    Assessment/Plan:     Debby was seen today for hospital follow-up.    Diagnoses and all orders for this visit:    ESRD (end stage renal disease) on dialysis (CMS-[facility])  A-V fistula (CMS-[facility])  Chronic lupus nephritis (CMS-[facility])  Narcotic dependence (CMS-[facility])  Fibromyalgia  Avascular necrosis of bones of both hips ([facility])  Mostly gave reassurance, discussed normal fatigue after dialysis, importance of going regularly, and to discuss with her nephrologist if her current schedule is optimal for her.  We also discussed seeing a pain specialist as her pain regimen might not be right for her.  She was agreeable to try and she will discuss with her rheumatologist as well, who she sees next week.  -     REFERRAL TO PAIN CLINIC    Chest pain, unspecified type  Will send to cardiology for evaluation.   -     REFERRAL TO CARDIOLOGY      Patient was seen for a total of 35 minutes face-to-face, with more than half of the time spent counseling or " coordinating care regarding the above mentioned problems.       Return in about 3 months (around 7/21/2017) for Med check.

## 2017-04-21 NOTE — MR AVS SNAPSHOT
"        Debby Zamorathu   2017 7:40 AM   Office Visit   MRN: [id number]    Department:  [address] [place] [address]   Dept Phone:  [phone]    Description:  Female : 1980   Provider:  Sushila Manzano M.D.           Reason for Visit     Hospital Follow-up           Allergies as of 2017     Allergen Noted Reactions    Morphine 2015   Itching    Tolerates Dilaudid    Seasonal 2016   Runny Nose, Itching    Hay fever, sabiha brush      You were diagnosed with     Fibromyalgia   [290881]       ESRD (end stage renal disease) on dialysis (CMS-HCC)   [592855]       Avascular necrosis of bones of both hips (CMS-HCC)   [839019]       Narcotic dependence (CMS-HCC)   [350387]       Chronic lupus nephritis (CMS-HCC)   [167854]       A-V fistula (CMS-HCC)   [376899]       Chest pain, unspecified type   [1366137]         Vital Signs     Blood Pressure Pulse Temperature Respirations Height Weight    122/76 mmHg 72 36.6 °C (97.9 °F) 14 1.651 m (5' 5\") 79.379 kg (175 lb)    Body Mass Index Oxygen Saturation Last Menstrual Period Smoking Status          29.12 kg/m2 96% 2017 (Approximate) Former Smoker        Basic Information     Date Of Birth Sex Race Ethnicity Preferred Language    1980 Female White Non- English      Problem List              ICD-10-CM Priority Class Noted - Resolved    DAVI (obstructive sleep apnea) G47.33 Medium  2011 - Present    Fibromyalgia M79.7 Low  2/10/2012 - Present    Anxiety F41.9 Low  2/10/2012 - Present    Chronic lupus nephritis (CMS-HCC) M32.14 Medium  2013 - Present    Narcotic dependence (CMS-HCC) F11.20 Low  2013 - Present    Hyperlipidemia E78.5 Low  2013 - Present    Vitamin D deficiency disease E55.9   2013 - Present    ESBL (extended spectrum beta-lactamase) producing bacteria infection A49.9, Z16.12 High  2014 - Present    Thrombocytopenia (CMS-HCC) D69.6 Low  2015 - Present    Drug-seeking behavior " (Chronic) Z76.5   8/2/2015 - Present    ESRD (end stage renal disease) on dialysis (CMS-HCC) N18.6, Z99.2   10/10/2016 - Present    Avascular necrosis of bones of both hips ([facility]) M87.051, M87.052   10/10/2016 - Present    Medical non-compliance Z91.19   1/4/2017 - Present    Hypertension I10   2/6/2017 - Present    A-V fistula (CMS-HCC) I77.0   4/21/2017 - Present      Health Maintenance        Date Due Completion Dates    IMM DTaP/Tdap/Td Vaccine (2 - Tdap) 9/18/2001 7/7/1997, 6/22/1987, 12/27/1985    IMM PNEUMOCOCCAL 19-64 (ADULT) HIGHEST RISK SERIES (1 of 3 - PCV13) 9/18/2001 ---    PAP SMEAR 9/18/2003 ---            Current Immunizations     DTP 6/22/1987, 12/27/1985    MMR Vaccine 7/7/1997    OPV - Historical Data 6/22/1987, 12/27/1985    TD Vaccine 7/7/1997      Below and/or attached are the medications your provider expects you to take. Review all of your home medications and newly ordered medications with your provider and/or pharmacist. Follow medication instructions as directed by your provider and/or pharmacist. Please keep your medication list with you and share with your provider. Update the information when medications are discontinued, doses are changed, or new medications (including over-the-counter products) are added; and carry medication information at all times in the event of emergency situations     Allergies:  MORPHINE - Itching     SEASONAL - Runny Nose,Itching               Medications  Valid as of: April 21, 2017 -  8:27 AM    Generic Name Brand Name Tablet Size Instructions for use    AmLODIPine Besylate (Tab) NORVASC 5 MG Take 5 mg by mouth as needed.        Ascorbic Acid (Tab) ascorbic acid 500 MG Take 500 mg by mouth every day.        BuPROPion HCl (TABLET SR 24 HR) WELLBUTRIN  MG Take 150 mg by mouth every morning.        Calcium Acetate (Phos Binder) (Cap) PHOS- MG Take 667 mg by mouth 3 times a day, with meals.        Cholecalciferol (Cap) VITAMIN D3 5000 UNIT Take 10,000  Units by mouth every day.        Hydroxychloroquine Sulfate (Tab) PLAQUENIL 200 MG Take 200 mg by mouth every bedtime.        Lidocaine (Patch) LIDODERM 5 % Apply 1 Patch to skin as directed every 12 hours. Apply to mid back        Multiple Vitamins-Minerals (Tab) THERAGRAN-M  Take 1 Tab by mouth every day.        Omeprazole (CAPSULE DELAYED RELEASE) PRILOSEC 20 MG Take 1 Cap by mouth every day.        OxyCODONE HCl (Tab) Oxycodone HCl 20 MG Take 0.5-1 Tabs by mouth every four hours as needed (moderate to severe pain).        Pregabalin (Cap) LYRICA 150 MG Take 150 mg by mouth 3 times a day.        Promethazine HCl (Tab) PHENERGAN 25 MG Take 25 mg by mouth every 6 hours as needed for Nausea/Vomiting.        TiZANidine HCl (Tab) ZANAFLEX 4 MG Take 2 Tabs by mouth every bedtime.        TraZODone HCl (Tab) DESYREL 50 MG Take 1 Tab by mouth every bedtime.        Warfarin Sodium (Tab) COUMADIN 5 MG Take 5-7.5 mg by mouth See Admin Instructions. Pt takes:  5MG on Sun,Mon,Wed,Thur, and Fri  7.5MG on Sat and Tue        .                 Medicines prescribed today were sent to:     [id number] - [place], [place] - [address]    [address] [place] [address]    Phone: [phone] Fax: [phone]    Open 24 Hours?: No      Medication refill instructions:       If your prescription bottle indicates you have medication refills left, it is not necessary to call your provider’s office. Please contact your pharmacy and they will refill your medication.    If your prescription bottle indicates you do not have any refills left, you may request refills at any time through one of the following ways: The online [employer] system (except Urgent Care), by calling your provider’s office, or by asking your pharmacy to contact your provider’s office with a refill request. Medication refills are processed only during regular business hours and may not be available until the next business day. Your provider may request additional  information or to have a follow-up visit with you prior to refilling your medication.   *Please Note: Medication refills are assigned a new Rx number when refilled electronically. Your pharmacy may indicate that no refills were authorized even though a new prescription for the same medication is available at the pharmacy. Please request the medicine by name with the pharmacy before contacting your provider for a refill.        Referral     A referral request has been sent to our patient care coordination department. Please allow 3-5 business days for us to process this request and contact you either by phone or mail. If you do not hear from us by the 5th business day, please call us at [phone].           [employer] Access Code: [email or web address]  Expires: 5/17/2017 12:50 PM    [employer]  A secure, online tool to manage your health information     [employer]® is a secure, online tool that connects you to your personalized health information from the privacy of your home -- day or night - making it very easy for you to manage your healthcare. Once the activation process is completed, you can even access your medical information using the [employer] ayaan, which is available for free in the Apple Ayaan store or Google Play store.     [employer] provides the following levels of access (as shown below):   My Chart Features   [facility] Doctor [facility]  Specialists [facility]  Urgent  Care Non-[facility]  Primary Care  Doctor   Email your healthcare team securely and privately 24/7 X X X    Manage appointments: schedule your next appointment; view details of past/upcoming appointments X      Request prescription refills. X      View recent personal medical records, including lab and immunizations X X X X   View health record, including health history, allergies, medications X X X X   Read reports about your outpatient visits, procedures, consult and ER notes X X X X   See your discharge summary, which is a recap of  your hospital and/or ER visit that includes your diagnosis, lab results, and care plan. X X       How to register for [employer]:  1. Go to  [email or web address].  2. Click on the Sign Up Now box, which takes you to the New Member Sign Up page. You will need to provide the following information:  a. Enter your [employer] Access Code exactly as it appears at the top of this page. (You will not need to use this code after you’ve completed the sign-up process. If you do not sign up before the expiration date, you must request a new code.)   b. Enter your date of birth.   c. Enter your home email address.   d. Click Submit, and follow the next screen’s instructions.  3. Create a My [employer] Workst ID. This will be your [employer] login ID and cannot be changed, so think of one that is secure and easy to remember.  4. Create a [employer] password. You can change your password at any time.  5. Enter your Password Reset Question and Answer. This can be used at a later time if you forget your password.   6. Enter your e-mail address. This allows you to receive e-mail notifications when new information is available in [employer].  7. Click Sign Up. You can now view your health information.    For assistance activating your [employer] account, call [phone]

## 2017-05-09 ENCOUNTER — HOSPITAL ENCOUNTER (EMERGENCY)
Dept: HOSPITAL 8 - ED | Age: 37
Discharge: HOME | End: 2017-05-09
Payer: MEDICARE

## 2017-05-09 VITALS — SYSTOLIC BLOOD PRESSURE: 144 MMHG | DIASTOLIC BLOOD PRESSURE: 96 MMHG

## 2017-05-09 VITALS — WEIGHT: 169.32 LBS | HEIGHT: 65 IN | BODY MASS INDEX: 28.21 KG/M2

## 2017-05-09 DIAGNOSIS — I12.0: ICD-10-CM

## 2017-05-09 DIAGNOSIS — M47.894: ICD-10-CM

## 2017-05-09 DIAGNOSIS — M47.896: Primary | ICD-10-CM

## 2017-05-09 DIAGNOSIS — Z99.2: ICD-10-CM

## 2017-05-09 DIAGNOSIS — N18.6: ICD-10-CM

## 2017-05-09 LAB
AST SERPL-CCNC: 7 U/L (ref 15–37)
BUN SERPL-MCNC: 64 MG/DL (ref 7–18)

## 2017-05-09 PROCEDURE — 81001 URINALYSIS AUTO W/SCOPE: CPT

## 2017-05-09 PROCEDURE — 71010: CPT

## 2017-05-09 PROCEDURE — 36415 COLL VENOUS BLD VENIPUNCTURE: CPT

## 2017-05-09 PROCEDURE — 74177 CT ABD & PELVIS W/CONTRAST: CPT

## 2017-05-09 PROCEDURE — 80053 COMPREHEN METABOLIC PANEL: CPT

## 2017-05-09 PROCEDURE — 84703 CHORIONIC GONADOTROPIN ASSAY: CPT

## 2017-05-09 PROCEDURE — 85025 COMPLETE CBC W/AUTO DIFF WBC: CPT

## 2017-05-09 PROCEDURE — 99285 EMERGENCY DEPT VISIT HI MDM: CPT

## 2017-05-09 PROCEDURE — 96374 THER/PROPH/DIAG INJ IV PUSH: CPT

## 2017-07-19 ENCOUNTER — APPOINTMENT (OUTPATIENT)
Dept: RADIOLOGY | Facility: MEDICAL CENTER | Age: 37
DRG: 871 | End: 2017-07-19
Attending: EMERGENCY MEDICINE
Payer: MEDICARE

## 2017-07-19 ENCOUNTER — APPOINTMENT (OUTPATIENT)
Dept: RADIOLOGY | Facility: MEDICAL CENTER | Age: 37
DRG: 871 | End: 2017-07-19
Attending: INTERNAL MEDICINE
Payer: MEDICARE

## 2017-07-19 ENCOUNTER — HOSPITAL ENCOUNTER (INPATIENT)
Facility: MEDICAL CENTER | Age: 37
LOS: 16 days | DRG: 871 | End: 2017-08-04
Attending: EMERGENCY MEDICINE | Admitting: INTERNAL MEDICINE
Payer: MEDICARE

## 2017-07-19 ENCOUNTER — RESOLUTE PROFESSIONAL BILLING HOSPITAL PROF FEE (OUTPATIENT)
Dept: HOSPITALIST | Facility: MEDICAL CENTER | Age: 37
End: 2017-07-19
Payer: MEDICARE

## 2017-07-19 DIAGNOSIS — J18.9 PNEUMONIA DUE TO INFECTIOUS ORGANISM, UNSPECIFIED LATERALITY, UNSPECIFIED PART OF LUNG: ICD-10-CM

## 2017-07-19 DIAGNOSIS — R50.9 FEVER, UNSPECIFIED FEVER CAUSE: ICD-10-CM

## 2017-07-19 DIAGNOSIS — N19 RENAL FAILURE: ICD-10-CM

## 2017-07-19 PROBLEM — R27.8 ASTERIXIS: Status: ACTIVE | Noted: 2017-07-19

## 2017-07-19 PROBLEM — N18.6 ESRD (END STAGE RENAL DISEASE) (HCC): Status: ACTIVE | Noted: 2017-07-19

## 2017-07-19 PROBLEM — G93.49 UREMIC ENCEPHALOPATHY: Status: ACTIVE | Noted: 2017-07-19

## 2017-07-19 PROBLEM — I10 HTN (HYPERTENSION): Status: ACTIVE | Noted: 2017-07-19

## 2017-07-19 PROBLEM — M54.50 LOW BACK PAIN: Status: ACTIVE | Noted: 2017-07-19

## 2017-07-19 PROBLEM — A41.9 SEPSIS, UNSPECIFIED: Status: ACTIVE | Noted: 2017-07-19

## 2017-07-19 PROBLEM — M32.9 LUPUS (HCC): Status: ACTIVE | Noted: 2017-07-19

## 2017-07-19 PROBLEM — R29.898 LEG WEAKNESS: Status: ACTIVE | Noted: 2017-07-19

## 2017-07-19 PROBLEM — J96.01 ACUTE HYPOXEMIC RESPIRATORY FAILURE (HCC): Status: ACTIVE | Noted: 2017-07-19

## 2017-07-19 PROBLEM — D64.9 ANEMIA: Status: ACTIVE | Noted: 2017-07-19

## 2017-07-19 LAB
ALBUMIN SERPL BCP-MCNC: 3.6 G/DL (ref 3.2–4.9)
ALBUMIN/GLOB SERPL: 1.1 G/DL
ALP SERPL-CCNC: 47 U/L (ref 30–99)
ALT SERPL-CCNC: 14 U/L (ref 2–50)
AMORPH CRY #/AREA URNS HPF: PRESENT /HPF
ANION GAP SERPL CALC-SCNC: 23 MMOL/L (ref 0–11.9)
APPEARANCE UR: CLEAR
APTT PPP: 54.9 SEC (ref 24.7–36)
AST SERPL-CCNC: 23 U/L (ref 12–45)
BACTERIA #/AREA URNS HPF: NEGATIVE /HPF
BASOPHILS # BLD AUTO: 0.5 % (ref 0–1.8)
BASOPHILS # BLD: 0.06 K/UL (ref 0–0.12)
BILIRUB SERPL-MCNC: 0.6 MG/DL (ref 0.1–1.5)
BILIRUB UR QL STRIP.AUTO: NEGATIVE
BNP SERPL-MCNC: 1827 PG/ML (ref 0–100)
BUN SERPL-MCNC: 94 MG/DL (ref 8–22)
CALCIUM SERPL-MCNC: 9.1 MG/DL (ref 8.5–10.5)
CHLORIDE SERPL-SCNC: 105 MMOL/L (ref 96–112)
CO2 SERPL-SCNC: 12 MMOL/L (ref 20–33)
COLOR UR: YELLOW
CREAT SERPL-MCNC: 11.6 MG/DL (ref 0.5–1.4)
CRP SERPL HS-MCNC: 22.17 MG/DL (ref 0–0.75)
EKG IMPRESSION: NORMAL
EKG IMPRESSION: NORMAL
EOSINOPHIL # BLD AUTO: 0.1 K/UL (ref 0–0.51)
EOSINOPHIL NFR BLD: 0.8 % (ref 0–6.9)
EPI CELLS #/AREA URNS HPF: ABNORMAL /HPF
ERYTHROCYTE [DISTWIDTH] IN BLOOD BY AUTOMATED COUNT: 48.1 FL (ref 35.9–50)
ERYTHROCYTE [SEDIMENTATION RATE] IN BLOOD BY WESTERGREN METHOD: >120 MM/HOUR (ref 0–20)
GFR SERPL CREATININE-BSD FRML MDRD: 4 ML/MIN/1.73 M 2
GLOBULIN SER CALC-MCNC: 3.4 G/DL (ref 1.9–3.5)
GLUCOSE SERPL-MCNC: 80 MG/DL (ref 65–99)
GLUCOSE UR STRIP.AUTO-MCNC: NEGATIVE MG/DL
HAV IGM SERPL QL IA: NEGATIVE
HBV CORE IGM SER QL: NEGATIVE
HBV SURFACE AB SERPL IA-ACNC: <3.1 MIU/ML (ref 0–10)
HBV SURFACE AG SER QL: NEGATIVE
HCT VFR BLD AUTO: 21.8 % (ref 37–47)
HCV AB SER QL: NEGATIVE
HGB BLD-MCNC: 6.9 G/DL (ref 12–16)
HYALINE CASTS #/AREA URNS LPF: ABNORMAL /LPF
IMM GRANULOCYTES # BLD AUTO: 0.11 K/UL (ref 0–0.11)
IMM GRANULOCYTES NFR BLD AUTO: 0.9 % (ref 0–0.9)
INR PPP: 1.42 (ref 0.87–1.13)
KETONES UR STRIP.AUTO-MCNC: ABNORMAL MG/DL
LACTATE BLD-SCNC: 0.8 MMOL/L (ref 0.5–2)
LACTATE BLD-SCNC: 1.2 MMOL/L (ref 0.5–2)
LACTATE BLD-SCNC: 1.4 MMOL/L (ref 0.5–2)
LEUKOCYTE ESTERASE UR QL STRIP.AUTO: NEGATIVE
LV EJECT FRACT  99904: 60
LV EJECT FRACT MOD 2C 99903: 67.81
LV EJECT FRACT MOD 4C 99902: 67.14
LV EJECT FRACT MOD BP 99901: 58.38
LYMPHOCYTES # BLD AUTO: 1.56 K/UL (ref 1–4.8)
LYMPHOCYTES NFR BLD: 13.1 % (ref 22–41)
MAGNESIUM SERPL-MCNC: 2.5 MG/DL (ref 1.5–2.5)
MCH RBC QN AUTO: 31.1 PG (ref 27–33)
MCHC RBC AUTO-ENTMCNC: 31.7 G/DL (ref 33.6–35)
MCV RBC AUTO: 98.2 FL (ref 81.4–97.8)
MICRO URNS: ABNORMAL
MONOCYTES # BLD AUTO: 1.34 K/UL (ref 0–0.85)
MONOCYTES NFR BLD AUTO: 11.3 % (ref 0–13.4)
NEUTROPHILS # BLD AUTO: 8.71 K/UL (ref 2–7.15)
NEUTROPHILS NFR BLD: 73.4 % (ref 44–72)
NITRITE UR QL STRIP.AUTO: NEGATIVE
NRBC # BLD AUTO: 0.02 K/UL
NRBC BLD AUTO-RTO: 0.2 /100 WBC
PH UR STRIP.AUTO: 6 [PH]
PHOSPHATE SERPL-MCNC: 10.5 MG/DL (ref 2.5–4.5)
PLATELET # BLD AUTO: 177 K/UL (ref 164–446)
PMV BLD AUTO: 11.1 FL (ref 9–12.9)
POTASSIUM SERPL-SCNC: 4.3 MMOL/L (ref 3.6–5.5)
PROT SERPL-MCNC: 7 G/DL (ref 6–8.2)
PROT UR QL STRIP: 300 MG/DL
PROTHROMBIN TIME: 17.8 SEC (ref 12–14.6)
RBC # BLD AUTO: 2.22 M/UL (ref 4.2–5.4)
RBC # URNS HPF: ABNORMAL /HPF
RBC UR QL AUTO: ABNORMAL
SODIUM SERPL-SCNC: 140 MMOL/L (ref 135–145)
SP GR UR STRIP.AUTO: 1.01
TROPONIN I SERPL-MCNC: 0.15 NG/ML (ref 0–0.04)
TROPONIN I SERPL-MCNC: 0.23 NG/ML (ref 0–0.04)
UROBILINOGEN UR STRIP.AUTO-MCNC: 0.2 MG/DL
WBC # BLD AUTO: 11.9 K/UL (ref 4.8–10.8)
WBC #/AREA URNS HPF: ABNORMAL /HPF

## 2017-07-19 PROCEDURE — 700102 HCHG RX REV CODE 250 W/ 637 OVERRIDE(OP): Performed by: INTERNAL MEDICINE

## 2017-07-19 PROCEDURE — 96367 TX/PROPH/DG ADDL SEQ IV INF: CPT

## 2017-07-19 PROCEDURE — A9270 NON-COVERED ITEM OR SERVICE: HCPCS | Performed by: HOSPITALIST

## 2017-07-19 PROCEDURE — 51701 INSERT BLADDER CATHETER: CPT

## 2017-07-19 PROCEDURE — 93005 ELECTROCARDIOGRAM TRACING: CPT | Performed by: INTERNAL MEDICINE

## 2017-07-19 PROCEDURE — 74176 CT ABD & PELVIS W/O CONTRAST: CPT

## 2017-07-19 PROCEDURE — 93971 EXTREMITY STUDY: CPT

## 2017-07-19 PROCEDURE — 700111 HCHG RX REV CODE 636 W/ 250 OVERRIDE (IP): Performed by: EMERGENCY MEDICINE

## 2017-07-19 PROCEDURE — 83735 ASSAY OF MAGNESIUM: CPT

## 2017-07-19 PROCEDURE — 70450 CT HEAD/BRAIN W/O DYE: CPT

## 2017-07-19 PROCEDURE — A9270 NON-COVERED ITEM OR SERVICE: HCPCS | Performed by: EMERGENCY MEDICINE

## 2017-07-19 PROCEDURE — 99291 CRITICAL CARE FIRST HOUR: CPT

## 2017-07-19 PROCEDURE — 700111 HCHG RX REV CODE 636 W/ 250 OVERRIDE (IP): Performed by: INTERNAL MEDICINE

## 2017-07-19 PROCEDURE — 83605 ASSAY OF LACTIC ACID: CPT | Mod: 91

## 2017-07-19 PROCEDURE — 5A1D60Z PERFORMANCE OF URINARY FILTRATION, MULTIPLE: ICD-10-PCS | Performed by: INTERNAL MEDICINE

## 2017-07-19 PROCEDURE — 81001 URINALYSIS AUTO W/SCOPE: CPT

## 2017-07-19 PROCEDURE — 93005 ELECTROCARDIOGRAM TRACING: CPT | Performed by: EMERGENCY MEDICINE

## 2017-07-19 PROCEDURE — 700105 HCHG RX REV CODE 258: Performed by: INTERNAL MEDICINE

## 2017-07-19 PROCEDURE — 700111 HCHG RX REV CODE 636 W/ 250 OVERRIDE (IP)

## 2017-07-19 PROCEDURE — 85025 COMPLETE CBC W/AUTO DIFF WBC: CPT

## 2017-07-19 PROCEDURE — 93971 EXTREMITY STUDY: CPT | Mod: 26 | Performed by: SURGERY

## 2017-07-19 PROCEDURE — 90935 HEMODIALYSIS ONE EVALUATION: CPT

## 2017-07-19 PROCEDURE — 700105 HCHG RX REV CODE 258

## 2017-07-19 PROCEDURE — 96368 THER/DIAG CONCURRENT INF: CPT

## 2017-07-19 PROCEDURE — 85610 PROTHROMBIN TIME: CPT

## 2017-07-19 PROCEDURE — 80074 ACUTE HEPATITIS PANEL: CPT

## 2017-07-19 PROCEDURE — 71010 DX-CHEST-PORTABLE (1 VIEW): CPT

## 2017-07-19 PROCEDURE — 84484 ASSAY OF TROPONIN QUANT: CPT | Mod: 91

## 2017-07-19 PROCEDURE — 304562 HCHG STAT O2 MASK/CANNULA

## 2017-07-19 PROCEDURE — 700102 HCHG RX REV CODE 250 W/ 637 OVERRIDE(OP): Performed by: HOSPITALIST

## 2017-07-19 PROCEDURE — 93306 TTE W/DOPPLER COMPLETE: CPT | Mod: 26 | Performed by: INTERNAL MEDICINE

## 2017-07-19 PROCEDURE — 86706 HEP B SURFACE ANTIBODY: CPT

## 2017-07-19 PROCEDURE — 86140 C-REACTIVE PROTEIN: CPT

## 2017-07-19 PROCEDURE — 87040 BLOOD CULTURE FOR BACTERIA: CPT | Mod: 91

## 2017-07-19 PROCEDURE — 87086 URINE CULTURE/COLONY COUNT: CPT

## 2017-07-19 PROCEDURE — 36415 COLL VENOUS BLD VENIPUNCTURE: CPT

## 2017-07-19 PROCEDURE — 93010 ELECTROCARDIOGRAM REPORT: CPT | Performed by: INTERNAL MEDICINE

## 2017-07-19 PROCEDURE — 96365 THER/PROPH/DIAG IV INF INIT: CPT

## 2017-07-19 PROCEDURE — 700101 HCHG RX REV CODE 250: Performed by: INTERNAL MEDICINE

## 2017-07-19 PROCEDURE — A9270 NON-COVERED ITEM OR SERVICE: HCPCS | Performed by: INTERNAL MEDICINE

## 2017-07-19 PROCEDURE — 85730 THROMBOPLASTIN TIME PARTIAL: CPT

## 2017-07-19 PROCEDURE — 83880 ASSAY OF NATRIURETIC PEPTIDE: CPT

## 2017-07-19 PROCEDURE — 770022 HCHG ROOM/CARE - ICU (200)

## 2017-07-19 PROCEDURE — 72131 CT LUMBAR SPINE W/O DYE: CPT

## 2017-07-19 PROCEDURE — 99291 CRITICAL CARE FIRST HOUR: CPT | Mod: AI | Performed by: INTERNAL MEDICINE

## 2017-07-19 PROCEDURE — 85652 RBC SED RATE AUTOMATED: CPT

## 2017-07-19 PROCEDURE — 93306 TTE W/DOPPLER COMPLETE: CPT

## 2017-07-19 PROCEDURE — 84100 ASSAY OF PHOSPHORUS: CPT

## 2017-07-19 PROCEDURE — 80053 COMPREHEN METABOLIC PANEL: CPT

## 2017-07-19 PROCEDURE — 700102 HCHG RX REV CODE 250 W/ 637 OVERRIDE(OP): Performed by: EMERGENCY MEDICINE

## 2017-07-19 RX ORDER — SODIUM CHLORIDE 9 MG/ML
500 INJECTION, SOLUTION INTRAVENOUS
Status: DISCONTINUED | OUTPATIENT
Start: 2017-07-19 | End: 2017-07-19

## 2017-07-19 RX ORDER — SODIUM CHLORIDE 9 MG/ML
250 INJECTION, SOLUTION INTRAVENOUS
Status: DISCONTINUED | OUTPATIENT
Start: 2017-07-19 | End: 2017-07-21

## 2017-07-19 RX ORDER — CLONIDINE HYDROCHLORIDE 0.1 MG/1
0.2 TABLET ORAL ONCE
Status: COMPLETED | OUTPATIENT
Start: 2017-07-19 | End: 2017-07-19

## 2017-07-19 RX ORDER — OXYCODONE HYDROCHLORIDE 20 MG/1
20 TABLET ORAL EVERY 6 HOURS PRN
Status: ON HOLD | COMMUNITY
End: 2017-08-04

## 2017-07-19 RX ORDER — DEXTROSE MONOHYDRATE 25 G/50ML
25 INJECTION, SOLUTION INTRAVENOUS
Status: DISCONTINUED | OUTPATIENT
Start: 2017-07-19 | End: 2017-08-04 | Stop reason: HOSPADM

## 2017-07-19 RX ORDER — HEPARIN SODIUM 1000 [USP'U]/ML
1500 INJECTION, SOLUTION INTRAVENOUS; SUBCUTANEOUS
Status: DISCONTINUED | OUTPATIENT
Start: 2017-07-19 | End: 2017-07-20

## 2017-07-19 RX ORDER — AMOXICILLIN 250 MG
2 CAPSULE ORAL 2 TIMES DAILY
Status: DISCONTINUED | OUTPATIENT
Start: 2017-07-19 | End: 2017-08-04 | Stop reason: HOSPADM

## 2017-07-19 RX ORDER — OXYCODONE HYDROCHLORIDE 10 MG/1
20 TABLET ORAL EVERY 6 HOURS PRN
Status: DISCONTINUED | OUTPATIENT
Start: 2017-07-19 | End: 2017-08-04 | Stop reason: HOSPADM

## 2017-07-19 RX ORDER — LABETALOL HYDROCHLORIDE 5 MG/ML
10 INJECTION, SOLUTION INTRAVENOUS EVERY 4 HOURS PRN
Status: DISCONTINUED | OUTPATIENT
Start: 2017-07-19 | End: 2017-07-23

## 2017-07-19 RX ORDER — HYDROXYCHLOROQUINE SULFATE 200 MG/1
200 TABLET, FILM COATED ORAL
Status: DISCONTINUED | OUTPATIENT
Start: 2017-07-19 | End: 2017-08-04 | Stop reason: HOSPADM

## 2017-07-19 RX ORDER — FAMOTIDINE 20 MG/1
20 TABLET, FILM COATED ORAL DAILY
Status: DISCONTINUED | OUTPATIENT
Start: 2017-07-19 | End: 2017-08-04 | Stop reason: HOSPADM

## 2017-07-19 RX ORDER — BISACODYL 10 MG
10 SUPPOSITORY, RECTAL RECTAL
Status: DISCONTINUED | OUTPATIENT
Start: 2017-07-19 | End: 2017-08-04 | Stop reason: HOSPADM

## 2017-07-19 RX ORDER — SODIUM CHLORIDE 9 MG/ML
250 INJECTION, SOLUTION INTRAVENOUS ONCE
Status: COMPLETED | OUTPATIENT
Start: 2017-07-19 | End: 2017-07-19

## 2017-07-19 RX ORDER — AZITHROMYCIN 500 MG/1
500 INJECTION, POWDER, LYOPHILIZED, FOR SOLUTION INTRAVENOUS ONCE
Status: COMPLETED | OUTPATIENT
Start: 2017-07-19 | End: 2017-07-19

## 2017-07-19 RX ORDER — OXYCODONE HYDROCHLORIDE 5 MG/1
2.5 TABLET ORAL EVERY 6 HOURS PRN
Status: DISCONTINUED | OUTPATIENT
Start: 2017-07-19 | End: 2017-07-19

## 2017-07-19 RX ORDER — ONDANSETRON 2 MG/ML
4 INJECTION INTRAMUSCULAR; INTRAVENOUS EVERY 4 HOURS PRN
Status: DISCONTINUED | OUTPATIENT
Start: 2017-07-19 | End: 2017-08-04 | Stop reason: HOSPADM

## 2017-07-19 RX ORDER — CEFTRIAXONE 2 G/1
2 INJECTION, POWDER, FOR SOLUTION INTRAMUSCULAR; INTRAVENOUS ONCE
Status: COMPLETED | OUTPATIENT
Start: 2017-07-19 | End: 2017-07-19

## 2017-07-19 RX ORDER — POLYETHYLENE GLYCOL 3350 17 G/17G
1 POWDER, FOR SOLUTION ORAL
Status: DISCONTINUED | OUTPATIENT
Start: 2017-07-19 | End: 2017-08-04 | Stop reason: HOSPADM

## 2017-07-19 RX ORDER — ONDANSETRON 4 MG/1
4 TABLET, ORALLY DISINTEGRATING ORAL EVERY 4 HOURS PRN
Status: DISCONTINUED | OUTPATIENT
Start: 2017-07-19 | End: 2017-08-04 | Stop reason: HOSPADM

## 2017-07-19 RX ORDER — ACETAMINOPHEN 325 MG/1
650 TABLET ORAL EVERY 6 HOURS PRN
Status: DISCONTINUED | OUTPATIENT
Start: 2017-07-19 | End: 2017-08-04 | Stop reason: HOSPADM

## 2017-07-19 RX ORDER — LIDOCAINE HYDROCHLORIDE 10 MG/ML
1 INJECTION, SOLUTION INFILTRATION; PERINEURAL PRN
Status: DISCONTINUED | OUTPATIENT
Start: 2017-07-19 | End: 2017-08-04 | Stop reason: HOSPADM

## 2017-07-19 RX ADMIN — PIPERACILLIN SODIUM AND TAZOBACTAM SODIUM 4.5 G: 4; .5 INJECTION, POWDER, FOR SOLUTION INTRAVENOUS at 08:56

## 2017-07-19 RX ADMIN — SODIUM CHLORIDE 250 ML: 9 INJECTION, SOLUTION INTRAVENOUS at 08:57

## 2017-07-19 RX ADMIN — OXYCODONE HYDROCHLORIDE 2.5 MG: 5 TABLET ORAL at 12:41

## 2017-07-19 RX ADMIN — AZITHROMYCIN MONOHYDRATE 500 MG: 500 INJECTION, POWDER, LYOPHILIZED, FOR SOLUTION INTRAVENOUS at 07:08

## 2017-07-19 RX ADMIN — PIPERACILLIN AND TAZOBACTAM 2.25 G: 2; .25 INJECTION, POWDER, LYOPHILIZED, FOR SOLUTION INTRAVENOUS; PARENTERAL at 22:38

## 2017-07-19 RX ADMIN — CLONIDINE HYDROCHLORIDE 0.2 MG: 0.1 TABLET ORAL at 05:54

## 2017-07-19 RX ADMIN — HYDROXYCHLOROQUINE SULFATE 200 MG: 200 TABLET, FILM COATED ORAL at 20:23

## 2017-07-19 RX ADMIN — VANCOMYCIN HYDROCHLORIDE 2000 MG: 100 INJECTION, POWDER, LYOPHILIZED, FOR SOLUTION INTRAVENOUS at 08:56

## 2017-07-19 RX ADMIN — LABETALOL HYDROCHLORIDE 10 MG: 5 INJECTION, SOLUTION INTRAVENOUS at 23:08

## 2017-07-19 RX ADMIN — HEPARIN SODIUM 1500 UNITS: 1000 INJECTION, SOLUTION INTRAVENOUS; SUBCUTANEOUS at 16:43

## 2017-07-19 RX ADMIN — PIPERACILLIN AND TAZOBACTAM 2.25 G: 2; .25 INJECTION, POWDER, LYOPHILIZED, FOR SOLUTION INTRAVENOUS; PARENTERAL at 14:52

## 2017-07-19 RX ADMIN — OXYCODONE HYDROCHLORIDE 20 MG: 10 TABLET ORAL at 15:50

## 2017-07-19 RX ADMIN — CEFTRIAXONE SODIUM 2 G: 2 INJECTION, POWDER, FOR SOLUTION INTRAMUSCULAR; INTRAVENOUS at 05:54

## 2017-07-19 RX ADMIN — STANDARDIZED SENNA CONCENTRATE AND DOCUSATE SODIUM 2 TABLET: 8.6; 5 TABLET, FILM COATED ORAL at 20:24

## 2017-07-19 ASSESSMENT — ENCOUNTER SYMPTOMS
PALPITATIONS: 1
SEIZURES: 0
LOSS OF CONSCIOUSNESS: 0
TREMORS: 1
WEAKNESS: 1
ORTHOPNEA: 1
SENSORY CHANGE: 1
FEVER: 0
CHILLS: 1
BLOOD IN STOOL: 0
NECK PAIN: 0
HEADACHES: 1
BLURRED VISION: 1
ABDOMINAL PAIN: 1
SHORTNESS OF BREATH: 1
COUGH: 1
DIARRHEA: 0
CHILLS: 0
WHEEZING: 0
FALLS: 0
EYE DISCHARGE: 0
DIZZINESS: 1
HEARTBURN: 0
CONSTIPATION: 0
NAUSEA: 1
FLANK PAIN: 0
DOUBLE VISION: 0
SPEECH CHANGE: 0
PND: 1
VOMITING: 1
EYE REDNESS: 0
SPUTUM PRODUCTION: 0
DEPRESSION: 0
FEVER: 1
PHOTOPHOBIA: 0
MYALGIAS: 0
BACK PAIN: 1
EYE PAIN: 0
TINGLING: 1
FOCAL WEAKNESS: 1

## 2017-07-19 ASSESSMENT — PATIENT HEALTH QUESTIONNAIRE - PHQ9
SUM OF ALL RESPONSES TO PHQ9 QUESTIONS 1 AND 2: 0
SUM OF ALL RESPONSES TO PHQ QUESTIONS 1-9: 0
1. LITTLE INTEREST OR PLEASURE IN DOING THINGS: NOT AT ALL
2. FEELING DOWN, DEPRESSED, IRRITABLE, OR HOPELESS: NOT AT ALL

## 2017-07-19 ASSESSMENT — PAIN SCALES - GENERAL
PAINLEVEL_OUTOF10: 8
PAINLEVEL_OUTOF10: 8
PAINLEVEL_OUTOF10: 5
PAINLEVEL_OUTOF10: 6

## 2017-07-19 ASSESSMENT — COPD QUESTIONNAIRES
DO YOU EVER COUGH UP ANY MUCUS OR PHLEGM?: NO/ONLY WITH OCCASIONAL COLDS OR INFECTIONS
DURING THE PAST 4 WEEKS HOW MUCH DID YOU FEEL SHORT OF BREATH: NONE/LITTLE OF THE TIME
COPD SCREENING SCORE: 2
HAVE YOU SMOKED AT LEAST 100 CIGARETTES IN YOUR ENTIRE LIFE: YES
COPD SCREENING SCORE: 2
DURING THE PAST 4 WEEKS HOW MUCH DID YOU FEEL SHORT OF BREATH: NONE/LITTLE OF THE TIME
HAVE YOU SMOKED AT LEAST 100 CIGARETTES IN YOUR ENTIRE LIFE: YES
DO YOU EVER COUGH UP ANY MUCUS OR PHLEGM?: NO/ONLY WITH OCCASIONAL COLDS OR INFECTIONS

## 2017-07-19 ASSESSMENT — LIFESTYLE VARIABLES
DO YOU DRINK ALCOHOL: NO
SUBSTANCE_ABUSE: 0
DO YOU DRINK ALCOHOL: NO
EVER_SMOKED: YES

## 2017-07-19 ASSESSMENT — PAIN SCALES - WONG BAKER: WONGBAKER_NUMERICALRESPONSE: HURTS A LITTLE MORE

## 2017-07-19 NOTE — IP AVS SNAPSHOT
" <p align=\"LEFT\"><IMG SRC=\"//[email or web address]$/[email or web address]\" alt=\"Image\" WIDTH=\"50%\" HEIGHT=\"200\" BORDER=\"\"></p>                   Name:Debby Carrizales  Medical Record Number:[id number]  CSN: [id number]    YOB: 1980   Age: 36 y.o.  Sex: female  HT:1.651 m (5' 5\") WT: 77 kg (169 lb 12.1 oz)          Admit Date: 7/19/2017     Discharge Date:   Today's Date: 8/4/2017  Attending Doctor:  Kenan Lima M.D.                  Allergies:  Ativan; Morphine; and Seasonal          Your appointments     Aug 07, 2017 11:00 AM   CARE MANAGER TELEPHONE VISIT with CARE MANAGER   [address]  [facility] [address] [phone]           ***IMPORTANT**** This is a phone visit only. Do not come into the office. The Care Manager will call you at the scheduled time for Care Manager Telephone Visit.            Aug 09, 2017 11:00 AM   Established Patient with HEMA Obregon   [address]  [facility] [address] [phone]           You will be receiving a confirmation call a few days before your appointment from our automated call confirmation system.              Follow-up Information     1. Follow up with Sushila Manzano M.D. In 1 week.    Specialty:  Family Medicine    Contact information    [address] [facility] [address]  [place] [address] [phone]          2. Follow up with postdischarge clinic follow-up in 1 week.         Medication List      Take these Medications        Instructions    amlodipine 5 MG Tabs   Commonly known as:  NORVASC    Take 5 mg by mouth as needed.   Dose:  5 mg       ascorbic acid 500 MG Tabs   Commonly known as:  ascorbic acid    Take 500 mg by mouth every day.   Dose:  500 mg       cholecalciferol 5000 UNIT Caps   Commonly known as:  VITAMIN D3    Take 10,000 Units by mouth every day.   Dose:  76912 Units       famotidine 20 MG Tabs   Commonly known as:  PEPCID    Take 1 Tab by " mouth every day.   Dose:  20 mg       ferrous sulfate 325 (65 FE) MG tablet    Take 1 Tab by mouth 2 times a day, with meals.   Dose:  325 mg       hydroxychloroquine 200 MG Tabs   Commonly known as:  PLAQUENIL    Take 200 mg by mouth every bedtime.   Dose:  200 mg       Oxycodone HCl 20 MG Tabs    Take 1 Tab by mouth every 6 hours as needed.   Dose:  20 mg       pregabalin 150 MG Caps   Commonly known as:  LYRICA    Take 150 mg by mouth 3 times a day.   Dose:  150 mg       promethazine 25 MG Tabs   Commonly known as:  PHENERGAN    Take 25 mg by mouth every 6 hours as needed for Nausea/Vomiting.   Dose:  25 mg       tizanidine 4 MG Tabs   Commonly known as:  ZANAFLEX    Take 2 Tabs by mouth every bedtime.   Dose:  8 mg       trazodone 50 MG Tabs   Commonly known as:  DESYREL    Take 1 Tab by mouth every bedtime.   Dose:  50 mg       WELLBUTRIN  MG XL tablet   Generic drug:  buPROPion    Take 150 mg by mouth every morning.   Dose:  150 mg

## 2017-07-19 NOTE — PROGRESS NOTES
"Pharmacy Kinetics 36 y.o. female on vancomycin day # 1 2017    Vancomycin New Start    2000 mg iv loading dose administered      Indication for Treatment:   Fever of unknown source     Pertinent history per medical record:   Admitted on 2017 for complaints of fever at home (x 48 hours prior to arrival with Tmax reportedly 103) and RLE pain. Patient is non-weight bearing at home.Patient states that she is ESRD on HD, but has been without HD treatment for more than a month due to issues in trying to switch over to peritoneal dialysis.  Pt with right AV fistula in place. History of recurrent UTIs. In the ED, she was found to be hypertensive and tachycardiac with a fever of 101.7. Empiric abx have been intiatied.     Imaging studies:  CXR:   1.  Patchy RIGHT lung opacities, most apparent in the upper lungs suggesting multifocal pneumonia.  2.  Findings suggest pulmonary vascular congestion.  3.  Stable mild cardiomegaly.    PMH: lupus, ESRD on HD    Other antibiotics:   Zosyn (4.5 gm q6h x 4 doses, followed by 2.25 mg q8h)     Allergies:  Morphine and Seasonal     List concerns for renal function:   ESRD on HD     Pertinent cultures to date: (most recent positives included only)  17 urine cx: E.faecalis (amp-sensitive)  17 urine cx: GBS   10/10/16 urine cx: E.coli (pan-sensitive)      Recent Labs      17   0538   WBC  11.9*   NEUTSPOLYS  73.40*     Recent Labs      17   0538   BUN  94*   CREATININE  11.60*   ALBUMIN  3.6     Last data filed at 17 0600   Gross per 24 hour   Intake      0 ml   Output     30 ml   Net    -30 ml      Blood pressure 187/101, pulse 103, temperature 38.7 °C (101.7 °F), resp. rate 18, height 1.651 m (5' 5\"), weight 81.647 kg (180 lb), SpO2 95 %. Temp (24hrs), Av.7 °C (101.7 °F), Min:38.7 °C (101.7 °F), Max:38.7 °C (101.7 °F)      A/P   1. Vancomycin dose change: pulse dosing - no further dose scheduled at this time   2. Next vancomycin level: VR " with AM labs in 2-3 days (not yet ordered)  3. Goal trough: 16-20  4. Comments: Patient with complex PMH is being treated empirically for sepsis without definitive source of infection identified. Initially pt was thought to have UTI given her history, but UA came back relatively unremarkable with 0-2 WBCs. Pt with O2 saturation consistently >90% with 2L NC. CT chest and abdomen/pelvis have been ordered. Will order MRSA nares swab per protocol given CXR findings suggestive of infection. VR in 2-3 days.  Pharmacy will continue to monitor and adjust dosing or recommend de-escalation as appropriate.       Devin Romero, PharmD

## 2017-07-19 NOTE — ASSESSMENT & PLAN NOTE
Plaquenil, Prednisone   muscle strength/anthropometric characteristics/gait, locomotion, and balance/gross motor

## 2017-07-19 NOTE — CONSULTS
Chief Complaint   Patient presents with   • Fever     x 48 hours, max 103   • Leg Pain     RLE, NWB at home       Requesting provider: Dr. Conde    HPI: 36-year-old patient with end-stage renal disease secondary to lupus nephritis. She had been undergoing dialysis but states that she has had difficulty with dialysis especially with sitting in a chair for 3 hours. She last dialyzed proximally 6 weeks ago. She states that she was planning on switching to pertoneal dialysis. I called the peritoneal dialysis unit, and she had been initially evaluated but was taken off the peritoneal dialysis list for consideration due to noncompliance. The patient comes in with scott uremia, asterixis, mentation changes, fatigue, weakness, lack of appetite. She states that she will undergo dialysis at the current time. Ideally she wants to try to go to peritoneal dialysis.    Past Medical History   Diagnosis Date   • Lupus (CMS-HCC)    • Fibromyalgia    • Hypertension    • Backpain    • Avascular necrosis    • Renal failure      stage 4 per pt   • Arthritis      all joints,r/t lupus   • Psychiatric disorder      anxiety, depression   • UTI (urinary tract infection) 4/4/2013   • Clostridium difficile colitis 5/3/2011   • Pneumonia    • Dialysis patient        Social History   Substance Use Topics   • Smoking status: Former Smoker -- 0.50 packs/day for 18 years     Types: Cigarettes     Quit date: 06/13/2011   • Smokeless tobacco: Never Used      Comment: 1/2 ppd   • Alcohol Use: No      Comment: occ       History reviewed. No pertinent family history.    Allergies   Allergen Reactions   • Morphine Itching     Tolerates Dilaudid   • Seasonal Runny Nose and Itching     Hay fever, sabiha brush       Review of Systems   Constitutional: Positive for malaise/fatigue. Negative for fever and chills.   Neurological: Positive for tingling, tremors and weakness.        Asterixis       Physical Exam   Constitutional: She appears toxic. She has  a sickly appearance.   Cardiovascular: Normal rate and regular rhythm.    Pulmonary/Chest: Effort normal and breath sounds normal.   Abdominal: Soft. Bowel sounds are normal.   Musculoskeletal: She exhibits edema.   Neurological:   Asterixis   Skin: Skin is warm and dry.   Psychiatric: Affect and judgment normal.       LABS:  Recent Labs      07/19/17   0538   WBC  11.9*   RBC  2.22*   HEMOGLOBIN  6.9*   HEMATOCRIT  21.8*   MCV  98.2*   MCH  31.1   RDW  48.1   PLATELETCT  177   MPV  11.1   NEUTSPOLYS  73.40*   LYMPHOCYTES  13.10*   MONOCYTES  11.30   EOSINOPHILS  0.80   BASOPHILS  0.50     Recent Labs      07/19/17   0538   SODIUM  140   POTASSIUM  4.3   CHLORIDE  105   CO2  12*   GLUCOSE  80   BUN  94*       STUDIES:    Assessment:  1. End-stage renal disease, noncompliant with treatment  2. Jonathan uremia, uremic encephalopathy, uremic pericarditis  3. History of lupus and lupus nephritis leading to end-stage renal disease  4. Anemia of chronic kidney disease, likely worsened by lack of Epogen due to noncompliance with attending dialysis      Plan:  1. We will need to dialyze the patient, however will do so carefully at low flows daily initially in order to reduce the risk of seizure  2. Ultimately we need to ramp up her dialysis, once again frequently in order to treat her uremic pericarditis  3. Her encephalopathy should clear relatively quickly  4. We will ultimately have discussions with her with regards to her care, in discussion with the various dialysis unit she has been to, she has become noncompliant in the past and this   has led to uremia

## 2017-07-19 NOTE — IP AVS SNAPSHOT
" Home Care Instructions                                                                                                                  Name:Debby Carrizales  Medical Record Number:[id number]  CSN: [id number]    YOB: 1980   Age: 36 y.o.  Sex: female  HT:1.651 m (5' 5\") WT: 77 kg (169 lb 12.1 oz)          Admit Date: 7/19/2017     Discharge Date:   Today's Date: 8/4/2017  Attending Doctor:  Kenan Lima M.D.                  Allergies:  Ativan; Morphine; and Seasonal            Discharge Instructions       Discharge Instructions    Discharged to home by car with relative. Discharged via wheelchair, hospital escort: Yes.  Special equipment needed: Wheelchair    Be sure to schedule a follow-up appointment with your primary care doctor or any specialists as instructed.     Discharge Plan:   Influenza Vaccine Indication: Patient Refuses    I understand that a diet low in cholesterol, fat, and sodium is recommended for good health. Unless I have been given specific instructions below for another diet, I accept this instruction as my diet prescription.   Other diet: Renal low potassium diet    Special Instructions:   Outpatient Dialysis set up per  [facility] at 3 [facility] MWF  Follow up with MD as instructed    · Is patient discharged on Warfarin / Coumadin?   No     · Is patient Post Blood Transfusion?  No    Depression / Suicide Risk    As you are discharged from this [facility] facility, it is important to learn how to keep safe from harming yourself.    Recognize the warning signs:  · Abrupt changes in personality, positive or negative- including increase in energy   · Giving away possessions  · Change in eating patterns- significant weight changes-  positive or negative  · Change in sleeping patterns- unable to sleep or sleeping all the time   · Unwillingness or inability to communicate  · Depression  · Unusual sadness, discouragement and loneliness  · Talk of wanting to " die  · Neglect of personal appearance   · Rebelliousness- reckless behavior  · Withdrawal from people/activities they love  · Confusion- inability to concentrate     If you or a loved one observes any of these behaviors or has concerns about self-harm, here's what you can do:  · Talk about it- your feelings and reasons for harming yourself  · Remove any means that you might use to hurt yourself (examples: pills, rope, extension cords, firearm)  · Get professional help from the community (Mental Health, Substance Abuse, psychological counseling)  · Do not be alone:Call your Safe Contact- someone whom you trust who will be there for you.  · Call your local CRISIS HOTLINE 622-0464 or 403-342-2014  · Call your local Children's Mobile Crisis Response Team [place] (500) 006-4135 or www.[place]  · Call the toll free National Suicide Prevention Hotlines   · National Suicide Prevention Lifeline 267-551-BTNP (1793)  · [employer] Line Network 800-SUICIDE (017-8499)        Your appointments     Aug 07, 2017 11:00 AM   CARE MANAGER TELEPHONE VISIT with CARE MANAGER   [address]  [place] [address] [phone]           ***IMPORTANT**** This is a phone visit only. Do not come into the office. The Care Manager will call you at the scheduled time for Care Manager Telephone Visit.            Aug 09, 2017 11:00 AM   Established Patient with HEMA Obregon   [facility]    [address] [place] [address] [address]  [place] [address] [phone]           You will be receiving a confirmation call a few days before your appointment from our automated call confirmation system.              Follow-up Information     1. Follow up with Sushila Manzano M.D. In 1 week.    Specialty:  Family Medicine    Contact information    [address]  [place] [address] [phone]          2. Follow up with postdischarge clinic follow-up in 1 week.          Discharge Medication Instructions:    Below are the medications your physician expects you to take upon discharge:    Review all your home medications and newly ordered medications with your doctor and/or pharmacist. Follow medication instructions as directed by your doctor and/or pharmacist.    Please keep your medication list with you and share with your physician.               Medication List      START taking these medications        Instructions    Morning Afternoon Evening Bedtime    famotidine 20 MG Tabs   Last time this was given:  20 mg on 8/4/2017  7:40 AM   Commonly known as:  PEPCID        Take 1 Tab by mouth every day.   Dose:  20 mg                        ferrous sulfate 325 (65 FE) MG tablet   Last time this was given:  325 mg on 8/4/2017  7:40 AM        Take 1 Tab by mouth 2 times a day, with meals.   Dose:  325 mg                          CONTINUE taking these medications        Instructions    Morning Afternoon Evening Bedtime    amlodipine 5 MG Tabs   Last time this was given:  10 mg on 7/25/2017  8:23 AM   Commonly known as:  NORVASC        Take 5 mg by mouth as needed.   Dose:  5 mg                        ascorbic acid 500 MG Tabs   Commonly known as:  ascorbic acid        Take 500 mg by mouth every day.   Dose:  500 mg                        cholecalciferol 5000 UNIT Caps   Commonly known as:  VITAMIN D3        Take 10,000 Units by mouth every day.   Dose:  22281 Units                        hydroxychloroquine 200 MG Tabs   Last time this was given:  200 mg on 8/3/2017  9:30 PM   Commonly known as:  PLAQUENIL        Take 200 mg by mouth every bedtime.   Dose:  200 mg                        Oxycodone HCl 20 MG Tabs   Last time this was given:  20 mg on 8/4/2017  6:12 AM        Take 1 Tab by mouth every 6 hours as needed.   Dose:  20 mg                        pregabalin 150 MG Caps   Last time this was given:  50 mg on 8/4/2017  7:41 AM   Commonly known as:  LYRICA        Take 150 mg by  mouth 3 times a day.   Dose:  150 mg                        promethazine 25 MG Tabs   Last time this was given:  25 mg on 8/4/2017  6:15 AM   Commonly known as:  PHENERGAN        Take 25 mg by mouth every 6 hours as needed for Nausea/Vomiting.   Dose:  25 mg                        tizanidine 4 MG Tabs   Last time this was given:  4 mg on 8/3/2017  9:30 PM   Commonly known as:  ZANAFLEX        Take 2 Tabs by mouth every bedtime.   Dose:  8 mg                        trazodone 50 MG Tabs   Last time this was given:  50 mg on 8/3/2017  9:30 PM   Commonly known as:  DESYREL        Take 1 Tab by mouth every bedtime.   Dose:  50 mg                        WELLBUTRIN  MG XL tablet   Generic drug:  buPROPion        Take 150 mg by mouth every morning.   Dose:  150 mg                             Where to Get Your Medications      These medications were sent to [facility] #[id number] - [place] - [address]  [address] [address]     Phone:  [phone]    - famotidine 20 MG Tabs  - ferrous sulfate 325 (65 FE) MG tablet      Information about where to get these medications is not yet available     ! Ask your nurse or doctor about these medications    - Oxycodone HCl 20 MG Tabs            Instructions           Diet / Nutrition:    Follow any diet instructions given to you by your doctor or the dietician, including how much salt (sodium) you are allowed each day.    If you are overweight, talk to your doctor about a weight reduction plan.    Activity:    Remain physically active following your doctor's instructions about exercise and activity.    Rest often.     Any time you become even a little tired or short of breath, SIT DOWN and rest.    Worsening Symptoms:    Report any of the following signs and symptoms to the doctor's office immediately:    *Pain of jaw, arm, or neck  *Chest pain not relieved by medication                               *Dizziness or loss of consciousness  *Difficulty breathing  even when at rest   *More tired than usual                                       *Bleeding drainage or swelling of surgical site  *Swelling of feet, ankles, legs or stomach                 *Fever (>100ºF)  *Pink or blood tinged sputum  *Weight gain (3lbs/day or 5lbs /week)           *Shock from internal defibrillator (if applicable)  *Palpitations or irregular heartbeats                *Cool and/or numb extremities    Stroke Awareness    Common Risk Factors for Stroke include:    Age  Atrial Fibrillation  Carotid Artery Stenosis  Diabetes Mellitus  Excessive alcohol consumption  High blood pressure  Overweight   Physical inactivity  Smoking    Warning signs and symptoms of a stroke include:    *Sudden numbness or weakness of the face, arm or leg (especially on one side of the body).  *Sudden confusion, trouble speaking or understanding.  *Sudden trouble seeing in one or both eyes.  *Sudden trouble walking, dizziness, loss of balance or coordination.Sudden severe headache with no known cause.    It is very important to get treatment quickly when a stroke occurs. If you experience any of the above warning signs, call 911 immediately.                   Disclaimer         Quit Smoking / Tobacco Use:    I understand the use of any tobacco products increases my chance of suffering from future heart disease or stroke and could cause other illnesses which may shorten my life. Quitting the use of tobacco products is the single most important thing I can do to improve my health. For further information on smoking / tobacco cessation call a Toll Free Quit Line at 1-102.465.1090 (*National Cancer [place]) or 1-164.383.3916 (American Lung Association) or you can access the web based program at www.lungusa.org.    [place] Tobacco Users Help Line:  (132) 762-5213       Toll Free: 1-954.855.6329  Quit Tobacco Program [facility] (958)875-0519    Crisis Hotline:    [place] Crisis Hotline:  0-612-WGQRBTS or  1-850.720.6221    [place] Crisis Hotline:    1-961.431.8140 or 934-631-0579    Discharge Survey:   Thank you for choosing [facility]. We hope we did everything we could to make your hospital stay a pleasant one. You may be receiving a phone survey and we would appreciate your time and participation in answering the questions. Your input is very valuable to us in our efforts to improve our service to our patients and their families.        My signature on this form indicates that:    1. I have reviewed and understand the above information.  2. My questions regarding this information have been answered to my satisfaction.  3. I have formulated a plan with my discharge nurse to obtain my prescribed medications for home.                  Disclaimer         __________________________________                     __________       ________                       Patient Signature                                                 Date                    Time

## 2017-07-19 NOTE — ED NOTES
Debby Carrizales  36 y.o.  Chief Complaint   Patient presents with   • Fever     x 48 hours, max 103   • Leg Pain     RLE, NWB at home     BIB EMS for above complaints. Patient with hx, of lupus, fibromyalgia, ESRD on dialysis. Patient states that she has not been to dialysis in over a month because she was having problems - states that she needs to switch to PD from HD.    Charge RN Avelino notified of Sepsis Score 5. Code Sepsis called per protocol.

## 2017-07-19 NOTE — ED PROVIDER NOTES
ED Provider Note    CHIEF COMPLAINT  Chief Complaint   Patient presents with   • Fever     x 48 hours, max 103   • Leg Pain     RLE, NWB at home       HPI  Debby Carrizales is a 36 y.o. female who presents to the emergency department with a fever. Patient has a history of lupus. She's also had E. coli bacteremia pneumonia and C. diff colitis in the past. She has a history of end-stage renal disease and has not gone to dialysis in a month. She cites that is just difficult to get there. She's been feeling progressively more and more weak. Noticed fever about 48 hours ago. Temperature was 103 per EMS. Patient complains of pain all over her body. She does make urine. He has not noticed dysuria or hematuria. She's had an occasional dry cough. No shortness of breath. She has not had nausea vomiting or diarrhea. She reports that she has not been able to move her right leg for the last 2 days. She's been dragging herself around the room. She reports that she lost balance a few times and hit her head on the ground or coffee table. She denies upper extremity symptoms or left lower extremity symptoms. She has some pain all across her lower back that she describes being in her kidney area. She also has a history of avascular necrosis of both her hips with chronic hip pain.    REVIEW OF SYSTEMS  As per HPI, otherwise a 10 point review of systems is negative    PAST MEDICAL HISTORY  Past Medical History   Diagnosis Date   • Lupus (CMS-HCC)    • Fibromyalgia    • Hypertension    • Backpain    • Avascular necrosis    • Renal failure      stage 4 per pt   • Arthritis      all joints,r/t lupus   • Psychiatric disorder      anxiety, depression   • UTI (urinary tract infection) 4/4/2013   • Clostridium difficile colitis 5/3/2011   • Pneumonia    • Dialysis patient        SOCIAL HISTORY  Social History   Substance Use Topics   • Smoking status: Former Smoker -- 0.50 packs/day for 18 years     Types: Cigarettes     Quit  date: 06/13/2011   • Smokeless tobacco: Never Used      Comment: 1/2 ppd   • Alcohol Use: No      Comment: occ       SURGICAL HISTORY  Past Surgical History   Procedure Laterality Date   • Gastroscopy-endo  4/10/2011     Performed by SYED JURADO at ENDOSCOPY [facility]   • Sclerotheraphy  4/10/2011     Performed by SYED JURADO at ENDOSCOPY [facility]   • Gastroscopy-endo  4/18/2011     Performed by ALCIRA CRUZ at ENDOSCOPY [facility]   • Colonoscopy with biopsy  4/20/2011     Performed by ALCIRA CRUZ at ENDOSCOPY [facility]   • Gastroscopy-endo  4/27/2011     Performed by PALMIRA DOAN at ENDOSCOPY [facility]   • Other  5/2011     tracheostomy   • Other abdominal surgery       kidney biopsy   • Av fistula creation  9/9/2014     Performed by Shabbir Ardon M.D. at [facility]   • Cath placement  9/9/2014     Performed by Shabbir Ardon M.D. at SURGERY [facility]   • Av fistula creation  11/14/2014     Performed by Shabbir Ardon M.D. at SURGERY [facility]   • Av fistula creation  2/3/2015     Performed by Shabbir Ardon M.D. at SURGERY [facility]   • Hip arthroplasty total Right 1/18/2016     Procedure: HIP ARTHROPLASTY TOTAL;  Surgeon: Michael Holman M.D.;  Location: [facility];  Service:    • Closed reduction Right 7/5/2016     Procedure: CLOSED REDUCTION- Hip ;  Surgeon: Michael Holman M.D.;  Location: SURGERY [facility];  Service:    • Av fistula creation Right 7/12/2016     Procedure: AV FISTULA CREATION WITH GRAFT BRACHIAL AXILLARY;  Surgeon: Shabbir Ardon M.D.;  Location: SURGERY [facility];  Service:    • Thrombectomy Right 8/20/2016     Procedure: THROMBECTOMY AV GRAFT;  Surgeon: Shabbir Ardon M.D.;  Location: [facility];  Service:    • Thrombectomy Right 8/21/2016     Procedure: THROMBECTOMY - right AV fistula graft with grams;  Surgeon: Sahbbir Ardon M.D.;   "Location: SURGERY [facility];  Service:    • Angioplasty balloon  8/21/2016     Procedure: ANGIOPLASTY BALLOON;  Surgeon: Shabbir Ardon M.D.;  Location: SURGERY [facility];  Service:    • Tracheostomy         CURRENT MEDICATIONS  Home Medications     Reviewed by Iraida Leyva R.N. (Registered Nurse) on 07/19/17 at 0517  Med List Status: Complete    Medication Last Dose Status    amlodipine (NORVASC) 5 MG Tab  Active    ascorbic acid (ASCORBIC ACID) 500 MG Tab  Active    buPROPion (WELLBUTRIN XL) 150 MG XL tablet  Active    cholecalciferol (VITAMIN D3) 5000 UNIT Cap  Active    hydroxychloroquine (PLAQUENIL) 200 MG Tab  Active    Oxycodone HCl 20 MG Tab  Active    pregabalin (LYRICA) 150 MG Cap  Active    promethazine (PHENERGAN) 25 MG Tab  Active    tizanidine (ZANAFLEX) 4 MG Tab  Active    trazodone (DESYREL) 50 MG Tab  Active                ALLERGIES  Allergies   Allergen Reactions   • Morphine Itching     Tolerates Dilaudid   • Seasonal Runny Nose and Itching     Hay fever, sabiha brush       PHYSICAL EXAM  VITAL SIGNS: /101 mmHg  Pulse 126  Temp(Src) 38.7 °C (101.7 °F)  Resp 14  Ht 1.651 m (5' 5\")  Wt 81.647 kg (180 lb)  BMI 29.95 kg/m2  SpO2 97%   Constitutional: Awake and alert, pale and weak appearing  HENT:  Atraumatic, Normocephalic.Oropharynx moist mucus membranes, Nose normal inspection.   Eyes: Normal inspection  Neck: Supple, positive JVD  Cardiovascular: Tachycardic heart rate, Normal rhythm.  Symmetric peripheral pulses.   Thorax & Lungs: Tachypnea, diffuse crackles. No wheezing  Abdomen: Bowel sounds normal, soft, non-distended, nontender, no mass  Skin: No pathologic rash  Back: No tenderness, No CVA tenderness.   Extremities: No asymmetric swelling. No pain with range of motion of the ankles or knees or feet. She has pain with range of motion of her hips bilaterally. No deformity or focal bony tenderness  Neurologic: She is awake alert and oriented. She is slow to answer " questions. She can move both upper extremities without difficulty. Moves the left lower extremity without difficulty. She is unable to dorsiflex or plantar flex the right foot. Reports she cannot extend or bend her right knee. Decreased sensation of the right lower extremity up to just below the right knee. Positive asterixis  Psychiatric: Anxious appearing    RADIOLOGY/PROCEDURES  ECHOCARDIOGRAM COMP W/O CONT   Final Result      LE VENOUS DUPLEX - DVT ([facility] Only)   Preliminary Result      CT-LSPINE W/O PLUS RECONS   Final Result      1.  Minimal spondylosis deformans, unchanged.   2.  No lumbar spine fracture or subluxation.      CT-HEAD W/O   Final Result      No acute intracranial abnormality.      DX-CHEST-PORTABLE (1 VIEW)   Final Result      1.  Patchy RIGHT lung opacities, most apparent in the upper lungs suggesting multifocal pneumonia.   2.  Findings suggest pulmonary vascular congestion.   3.  Stable mild cardiomegaly.      MR-LUMBAR SPINE-W/O    (Results Pending)   CT-CHEST,ABDOMEN,PELVIS W/O    (Results Pending)      Imaging is interpreted by radiologist    Labs:  Results for orders placed or performed during the hospital encounter of 07/19/17   Lactic acid (lactate)   Result Value Ref Range    Lactic Acid 1.4 0.5 - 2.0 mmol/L   Lactic acid (lactate)   Result Value Ref Range    Lactic Acid 1.2 0.5 - 2.0 mmol/L   CBC WITH DIFFERENTIAL   Result Value Ref Range    WBC 11.9 (H) 4.8 - 10.8 K/uL    RBC 2.22 (L) 4.20 - 5.40 M/uL    Hemoglobin 6.9 (L) 12.0 - 16.0 g/dL    Hematocrit 21.8 (L) 37.0 - 47.0 %    MCV 98.2 (H) 81.4 - 97.8 fL    MCH 31.1 27.0 - 33.0 pg    MCHC 31.7 (L) 33.6 - 35.0 g/dL    RDW 48.1 35.9 - 50.0 fL    Platelet Count 177 164 - 446 K/uL    MPV 11.1 9.0 - 12.9 fL    Neutrophils-Polys 73.40 (H) 44.00 - 72.00 %    Lymphocytes 13.10 (L) 22.00 - 41.00 %    Monocytes 11.30 0.00 - 13.40 %    Eosinophils 0.80 0.00 - 6.90 %    Basophils 0.50 0.00 - 1.80 %    Immature Granulocytes  0.90 0.00 - 0.90 %    Nucleated RBC 0.20 /100 WBC    Neutrophils (Absolute) 8.71 (H) 2.00 - 7.15 K/uL    Lymphs (Absolute) 1.56 1.00 - 4.80 K/uL    Monos (Absolute) 1.34 (H) 0.00 - 0.85 K/uL    Eos (Absolute) 0.10 0.00 - 0.51 K/uL    Baso (Absolute) 0.06 0.00 - 0.12 K/uL    Immature Granulocytes (abs) 0.11 0.00 - 0.11 K/uL    NRBC (Absolute) 0.02 K/uL   COMP METABOLIC PANEL   Result Value Ref Range    Sodium 140 135 - 145 mmol/L    Potassium 4.3 3.6 - 5.5 mmol/L    Chloride 105 96 - 112 mmol/L    Co2 12 (L) 20 - 33 mmol/L    Anion Gap 23.0 (H) 0.0 - 11.9    Glucose 80 65 - 99 mg/dL    Bun 94 (HH) 8 - 22 mg/dL    Creatinine 11.60 (HH) 0.50 - 1.40 mg/dL    Calcium 9.1 8.5 - 10.5 mg/dL    AST(SGOT) 23 12 - 45 U/L    ALT(SGPT) 14 2 - 50 U/L    Alkaline Phosphatase 47 30 - 99 U/L    Total Bilirubin 0.6 0.1 - 1.5 mg/dL    Albumin 3.6 3.2 - 4.9 g/dL    Total Protein 7.0 6.0 - 8.2 g/dL    Globulin 3.4 1.9 - 3.5 g/dL    A-G Ratio 1.1 g/dL   URINALYSIS   Result Value Ref Range    Color Yellow     Character Clear     Specific Gravity 1.014 <1.035    Ph 6.0 5.0-8.0    Glucose Negative Negative mg/dL    Ketones Trace (A) Negative mg/dL    Protein 300 (A) Negative mg/dL    Bilirubin Negative Negative    Urobilinogen, Urine 0.2 Negative    Nitrite Negative Negative    Leukocyte Esterase Negative Negative    Occult Blood Small (A) Negative    Micro Urine Req Microscopic    URINE MICROSCOPIC (W/UA)   Result Value Ref Range    WBC 0-2 /hpf    RBC 5-10 (A) /hpf    Bacteria Negative None /hpf    Epithelial Cells Few /hpf    Amorphous Crystal Present /hpf    Hyaline Cast 3-5 (A) /lpf   ESTIMATED GFR   Result Value Ref Range    GFR If African American 5 (A) >60 mL/min/1.73 m 2    GFR If Non African American 4 (A) >60 mL/min/1.73 m 2   EKG (NOW)   Result Value Ref Range    Report       [facility]    Test Date:  2017-07-19  Pt Name:    TAYLOR WARD               Department: ER  MRN:        [id number]                       Room:       [facility]  Gender:     F                            Technician: [id number]  :        1980                   Requested By:KATT RODRIGUEZ  Order #:    [id number]                    Reading MD: KATT RODRIGUEZ MD    Measurements  Intervals                                Axis  Rate:       125                          P:          67  ME:         124                          QRS:        28  QRSD:       76                           T:          58  QT:         300  QTc:        433    Interpretive Statements  SINUS TACHYCARDIA  Compared to ECG 2017 15:42:34  Sinus rhythm no longer present    Electronically Signed On 2017 5:42:44 PDT by KATT RODRIGUEZ MD     ECHOCARDIOGRAM COMP W/O CONT   Result Value Ref Range    Eject.Frac. MOD BP 58.38     Eject.Frac. MOD 4C 67.14     Eject.Frac. MOD 2C 67.81     Left Ventrical Ejection Fraction 60          COURSE & MEDICAL DECISION MAKING  History present the ER with fever. Unspecified source. Has history of E. coli bacteremia and UTI. This is working diagnosis. She appears volume overloaded on her exam and has been noncompliant with hemodialysis. She is given Rocephin intravenously. Chest x-ray shows possible multifocal pneumonia and is given azithromycin in addition to this. Blood pressure is elevated. She is given clonidine orally. She is given Tylenol for her fever. We'll hold on IVF as volume overloaded and hypertensive. A lactic acid is normal. She has a weak right lower extremity. No other neurologic findings on exam aside from evidence of uremia. I cannot explain this. I obtained a lumbar spine CT scan. She may benefit from MRI to rule out infectious process of the spine. Her data otherwise returned showing her renal failure and noncompliance. I consulted Dr. Flor with nephrology. I consulted Dr. Hunt for admission.     Patient referred to primary provider for blood pressure management    FINAL IMPRESSION  1. Fever, sepsis  2.  Possible pneumonia  3. End-stage renal disease with noncompliance with hemodialysis  4. Lupus  5. Right lower 70 weakness and numbness    CRITICAL CARE TIME 30 minutes  There was a very real possibility of deterioration of the patient's condition.  This patient required the highest level of care.  I provided critical care services which included: review of the medical record, treatment orders, ordering and reviewing test results, frequent reevaluation of the patient's condition and response to treatment, as well as discussing the case with appropriate personnel and various consultants. The critical care time associated with the care of this patient is exclusive of any procedures or specific interventions.    This dictation was created using voice recognition software. The accuracy of the dictation is limited to the abilities of the software.  The nursing notes were reviewed and certain aspects of this information were incorporated into this note.      Electronically signed by: Irving Valenzuela, 7/19/2017 6:54 AM

## 2017-07-19 NOTE — CONSULTS
"Brief cardiology note:  36 w chronic kid dis mild dyspnea.  Off dialysis prolonged time.  Concern for pericardial effusion    Reviewed echo and \"wet read\" -  Normal LV function and NO pericardial effusion.    Rec: Nephrology Mgmt.    No need cardiology - sign off    Team KIMMIE Huerta  "

## 2017-07-19 NOTE — ED NOTES
Pt comfort checked, pt given ice chips, pt updated on POC, no other needs at this time, family at BS

## 2017-07-19 NOTE — IP AVS SNAPSHOT
Actimo Access Code: [email or web address]  Expires: 8/27/2017  4:10 AM    Actimo  A secure, online tool to manage your health information     [employer] Actimo® is a secure, online tool that connects you to your personalized health information from the privacy of your home -- day or night - making it very easy for you to manage your healthcare. Once the activation process is completed, you can even access your medical information using the Actimo ayaan, which is available for free in the Apple Ayaan store or Google Play store.     Actimo provides the following levels of access (as shown below):   My Chart Features   [facility] Primary Care Doctor [facility]  Specialists [facility]  Urgent  Care Non-[facility]  Primary Care  Doctor   Email your healthcare team securely and privately 24/7 X X X X   Manage appointments: schedule your next appointment; view details of past/upcoming appointments X      Request prescription refills. X      View recent personal medical records, including lab and immunizations X X X X   View health record, including health history, allergies, medications X X X X   Read reports about your outpatient visits, procedures, consult and ER notes X X X X   See your discharge summary, which is a recap of your hospital and/or ER visit that includes your diagnosis, lab results, and care plan. X X       How to register for Actimo:  1. Go to  [email or web address].  2. Click on the Sign Up Now box, which takes you to the New Member Sign Up page. You will need to provide the following information:  a. Enter your Actimo Access Code exactly as it appears at the top of this page. (You will not need to use this code after you’ve completed the sign-up process. If you do not sign up before the expiration date, you must request a new code.)   b. Enter your date of birth.   c. Enter your home email address.   d. Click Submit, and follow the next screen’s instructions.  3. Create a Actimo ID. This will be your Actimo  login ID and cannot be changed, so think of one that is secure and easy to remember.  4. Create a [employer] password. You can change your password at any time.  5. Enter your Password Reset Question and Answer. This can be used at a later time if you forget your password.   6. Enter your e-mail address. This allows you to receive e-mail notifications when new information is available in Game[employer].  7. Click Sign Up. You can now view your health information.    For assistance activating your Game[employer] account, call [phone]

## 2017-07-19 NOTE — ASSESSMENT & PLAN NOTE
- This is sepsis (without associated acute organ dysfunction).   - sepsis has been ruled OUT, could be autoimmune  - Source evaluation to continue. CTAP is unremarkable, MRI L spine pending.  - has resolved, unclear if truly sepsis at the beginning, could have been a lupus flare, all cultures are NGTD

## 2017-07-19 NOTE — ED NOTES
Straight catheterization performed per orders using sterile technique. Patient tolerated well without pain/discomfort. 30 mL clear yellow urine returned. Specimen sent to lab per orders.

## 2017-07-19 NOTE — IP AVS SNAPSHOT
8/4/2017    Debby Carrizales  [address]  [address]  [place] [address]    Dear Debby:    [facility] wants to ensure your discharge home is safe and you or your loved ones have had all of your questions answered regarding your care after you leave the hospital.    Below is a list of resources and contact information should you have any questions regarding your hospital stay, follow-up instructions, or active medical symptoms.    Questions or Concerns Regarding… Contact   Medical Questions Related to Your Discharge  (7 days a week, 8am-5pm) Contact a Nurse Care Coordinator   [phone]   Medical Questions Not Related to Your Discharge  (24 hours a day / 7 days a week)  Contact the Nurse Health Line   [phone]    Medications or Discharge Instructions Refer to your discharge packet   or contact your [facility] Primary Care Provider   [phone]   Follow-up Appointment(s) Schedule your appointment via [employer]   or contact Scheduling [phone]   Billing Review your statement via [employer]  or contact Billing [phone]   Medical Records Review your records via [employer]   or contact Medical Records [phone]     You may receive a telephone call within two days of discharge. This call is to make certain you understand your discharge instructions and have the opportunity to have any questions answered. You can also easily access your medical information, test results and upcoming appointments via the [employer] free online health management tool. You can learn more and sign up at [employer]. For assistance setting up your [employer] account, please call [phone].    Once again, we want to ensure your discharge home is safe and that you have a clear understanding of any next steps in your care. If you have any questions or concerns, please do not hesitate to contact us, we are here for you. Thank you for choosing [facility] for your healthcare needs.    Sincerely,    Your [facility] Healthcare Team

## 2017-07-19 NOTE — H&P
Hospital Medicine History and Physical    Date of Service  7/19/2017    Chief Complaint  Chief Complaint   Patient presents with   • Fever     x 48 hours, max 103   • Leg Pain     RLE, NWB at home       History of Presenting Illness  36 y.o. female who presented 7/19/2017 with fever. She is a poor historian. 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 She reports she still makes urine. She undergoes IHD through R AVF. Used to be on coumadin to prevent AVF thrombosis. Reports she gave up IHD to be transitioned to PD. She reports she has been having fever and chills. THese started 2 days ago. She reports Tmax at home has been 103.7. She reports nausea and vomiting. Not providing details to the amount of vomiting. Denies any blood in vomitus. Overall slow, jittery and having myoclonic twitches 2/2 uremia. Gross asterixis / tremors evident. Otherwise she c/o SOB, CHAVES, LE swelling. Also c/o cough. Uncertain if productive. Also c/o chest pain. Because of change in mentation, does not provide details regarding the character, intensity, radiation. Reports mostly pleuritic in etiology. Reports back pain / Lumbar in location and R flank pain. Again unable to provide much details regarding these. Appears these have been bothering the patient over the last 2-3 days. Denies any BRBPR, melena or diarrhea. Still makes urine. Denies any urinary complaints. Denies any hematuria. Reports RLE weakness below knee and numbness which developed 2-3 days ago. Looks like a foot drop.     Primary Care Physician  Sushila Manzano M.D.    Consultants  Dr Leal from nephrology  Cardiology for stat echo    Code Status  Full Code    Review of Systems  Review of Systems   Constitutional: Positive for fever, chills and malaise/fatigue.   HENT: Negative for hearing loss.    Eyes: Positive for blurred vision. Negative for double vision, photophobia, pain, discharge and redness.    Respiratory: Positive for cough and shortness of breath. Negative for sputum production and wheezing.    Cardiovascular: Positive for chest pain, palpitations, orthopnea, leg swelling and PND.   Gastrointestinal: Positive for nausea, vomiting and abdominal pain. Negative for heartburn, diarrhea, constipation, blood in stool and melena.   Genitourinary: Negative for dysuria, urgency, frequency, hematuria and flank pain.   Musculoskeletal: Positive for back pain and joint pain. Negative for myalgias, falls and neck pain.   Skin: Negative for itching and rash.   Neurological: Positive for dizziness, tingling, tremors, sensory change, focal weakness, weakness and headaches. Negative for speech change, seizures and loss of consciousness.   Psychiatric/Behavioral: Negative for depression, suicidal ideas and substance abuse.          Past Medical History  Past Medical History   Diagnosis Date   • Lupus (CMS-HCC)    • Fibromyalgia    • Hypertension    • Backpain    • Avascular necrosis    • Renal failure      stage 4 per pt   • Arthritis      all joints,r/t lupus   • Psychiatric disorder      anxiety, depression   • UTI (urinary tract infection) 4/4/2013   • Clostridium difficile colitis 5/3/2011   • Pneumonia    • Dialysis patient        Surgical History  Past Surgical History   Procedure Laterality Date   • Gastroscopy-endo  4/10/2011     Performed by SYED JURADO at ENDOSCOPY [facility]   • Sclerotheraphy  4/10/2011     Performed by SYED JURADO at ENDOSCOPY [facility]   • Gastroscopy-endo  4/18/2011     Performed by ALCIRA CRUZ at ENDOSCOPY [facility]   • Colonoscopy with biopsy  4/20/2011     Performed by ALCIRA CRUZ at ENDOSCOPY [facility]   • Gastroscopy-endo  4/27/2011     Performed by PALMIRA DOAN at ENDOSCOPY [facility]S   • Other  5/2011     tracheostomy   • Other abdominal surgery       kidney biopsy   • Av fistula creation  9/9/2014      Performed by Shabbir Ardon M.D. at SURGERY [facility]   • Cath placement  9/9/2014     Performed by Shabbir Ardon M.D. at [facility]   • Av fistula creation  11/14/2014     Performed by Shabbir Ardon M.D. at [facility]   • Av fistula creation  2/3/2015     Performed by Shabbir Ardon M.D. at SURGERY [facility]   • Hip arthroplasty total Right 1/18/2016     Procedure: HIP ARTHROPLASTY TOTAL;  Surgeon: Michael Holman M.D.;  Location: [facility];  Service:    • Closed reduction Right 7/5/2016     Procedure: CLOSED REDUCTION- Hip ;  Surgeon: Michael Holman M.D.;  Location: [facility];  Service:    • Av fistula creation Right 7/12/2016     Procedure: AV FISTULA CREATION WITH GRAFT BRACHIAL AXILLARY;  Surgeon: Shabbir Ardon M.D.;  Location: [facility];  Service:    • Thrombectomy Right 8/20/2016     Procedure: THROMBECTOMY AV GRAFT;  Surgeon: Shabbir Ardon M.D.;  Location: SURGERY [facility];  Service:    • Thrombectomy Right 8/21/2016     Procedure: THROMBECTOMY - right AV fistula graft with grams;  Surgeon: Shabbir Ardon M.D.;  Location: [facility];  Service:    • Angioplasty balloon  8/21/2016     Procedure: ANGIOPLASTY BALLOON;  Surgeon: Shabbir Ardon M.D.;  Location: [facility];  Service:    • Tracheostomy         Medications  No current facility-administered medications on file prior to encounter.     Current Outpatient Prescriptions on File Prior to Encounter   Medication Sig Dispense Refill   • amlodipine (NORVASC) 5 MG Tab Take 5 mg by mouth as needed.     • ascorbic acid (ASCORBIC ACID) 500 MG Tab Take 500 mg by mouth every day.     • buPROPion (WELLBUTRIN XL) 150 MG XL tablet Take 150 mg by mouth every morning.     • pregabalin (LYRICA) 150 MG Cap Take 150 mg by mouth 3 times a day.     • promethazine (PHENERGAN) 25 MG Tab Take 25 mg by mouth every 6 hours as needed for  Nausea/Vomiting.     • hydroxychloroquine (PLAQUENIL) 200 MG Tab Take 200 mg by mouth every bedtime.     • cholecalciferol (VITAMIN D3) 5000 UNIT Cap Take 10,000 Units by mouth every day.     • trazodone (DESYREL) 50 MG Tab Take 1 Tab by mouth every bedtime. 30 Tab 0   • tizanidine (ZANAFLEX) 4 MG Tab Take 2 Tabs by mouth every bedtime. 60 Tab 0       Family History  History reviewed. No pertinent family history.    Social History  Social History   Substance Use Topics   • Smoking status: Former Smoker -- 0.50 packs/day for 18 years     Types: Cigarettes     Quit date: 2011   • Smokeless tobacco: Never Used      Comment:  ppd   • Alcohol Use: No      Comment: occ       Allergies  Allergies   Allergen Reactions   • Morphine Itching     Tolerates Dilaudid   • Seasonal Runny Nose and Itching     Hay fever, sabiha brush        Physical Exam  Laboratory   Hemodynamics  Temp (24hrs), Av.7 °C (101.7 °F), Min:38.7 °C (101.7 °F), Max:38.7 °C (101.7 °F)   Temperature: (!) 38.7 °C (101.7 °F)  Pulse  Av.4  Min: 103  Max: 132 Heart Rate (Monitored): (!) 103  Blood Pressure: (!) 187/101 mmHg, NIBP: 113/74 mmHg      Respiratory      Respiration: 18, Pulse Oximetry: 95 %             Physical Exam   Constitutional: She is oriented to person, place, and time. She appears well-developed and well-nourished. She appears distressed.   HENT:   Head: Normocephalic.   Mouth/Throat: Oropharynx is clear and moist. No oropharyngeal exudate.   Eyes: Pupils are equal, round, and reactive to light. No scleral icterus.   Pale conjunctivae   Neck: JVD present.   Cardiovascular:   No murmur heard.  Tachycardia.    Pulmonary/Chest: No stridor.   B/l basal crackles. Diminished in bases   Abdominal: Soft. There is tenderness (Generalized. R flank the most. ).   Decreased BS.    Musculoskeletal: She exhibits edema. She exhibits no tenderness.   Neurological: She is alert and oriented to person, place, and time. No cranial nerve deficit.    Profound asterexis. Myoclonic twitches / Jerks. R foot drop. Numbness and Weakness below R knee. Back pain.    Skin: Skin is warm and dry. She is not diaphoretic.   Psychiatric: She has a normal mood and affect. Her behavior is normal.       Recent Labs      07/19/17   0538   WBC  11.9*   RBC  2.22*   HEMOGLOBIN  6.9*   HEMATOCRIT  21.8*   MCV  98.2*   MCH  31.1   MCHC  31.7*   RDW  48.1   PLATELETCT  177   MPV  11.1     Recent Labs      07/19/17   0538   SODIUM  140   POTASSIUM  4.3   CHLORIDE  105   CO2  12*   GLUCOSE  80   BUN  94*   CREATININE  11.60*   CALCIUM  9.1     Recent Labs      07/19/17   0538   ALTSGPT  14   ASTSGOT  23   ALKPHOSPHAT  47   TBILIRUBIN  0.6   GLUCOSE  80                 Lab Results   Component Value Date    TROPONINI <0.01 04/04/2017       Imaging  Reviewed   Assessment/Plan     I anticipate this patient will require at least two midnights for appropriate medical management, necessitating inpatient admission.    Sepsis (CMS-HCC) (present on admission)  Assessment & Plan  - This is sepsis (without associated acute organ dysfunction).   - SIRS given leukocytosis, fevers, tachycardia, tachypnea. Source presumed pulmonary  - Respiratory failure / Encephalopathy is not related to sepsis  - Not candidate for 30 ml / Kg IVF bolus. This will fluid overload patient and lead to respiratory failure and intubation for sure  - Source evaluation to continue. Obtain CT C/A/P. MRI lumbar spine.   - Sepsis protocol  - Nosocomial exposure. Dialysis history. IV vancomycin / Zosyn. De escalate as able.     Uremic encephalopathy (present on admission)  Assessment & Plan  - Patient is slow. Asterixis is seen.   - Avoid sedative, narcotics / BZDs as able  - Decrease baseline narcotics. 2.5 mg q 6 to avoid withdrawal. No further medications until uremia is improved.   - High risk of developing seizures, status epilepticus and associated complications.   - Discussed with patient need for IHD. She is declining.  Discussed risks including seizures, status epilepticus, permanent brain damage / vegetative state and death. Patient verbalized understanding. She reports if it be then it be I am okay with it.   - Defer further recommendations to Dr Gotti  - Stress ulcer ppx with pepcid  - TTE to r/o uremic pericarditis given tachycardia. Fevers.   - CT Chest to r/o pleuritis  - No SC heparin for DVT ppx given associated plt dysfunction with uremia  - Aspiration / Fall / Seizure precautions  - At very high risk of developing uremia related complications. High risk of death. Admit to ICU. Risk of sudden de escalation.     Pneumonia (present on admission)  Assessment & Plan  - Multifocal based on CXRAY  - Obtain CT Chest  - Pneumonia protocol  - Hx nosocomial exposure. IV vanc / Zosyn. De escalate as able.     Acute hypoxemic respiratory failure (CMS-HCC) (present on admission)  Assessment & Plan  - 2/2 underlying pneumonia and fluid overload. Not related to sepsis.   - Needs fluid removal with IHD. Discussed with Dr Leal  - Pneumonia treatment.   - Monitor closely in the ICU  - High risk of de escalation and need for intubation    Narcotic dependence (CMS-HCC) (present on admission)  Assessment & Plan  - Decrease regimen.   - Monitor for withdrawal if any  - Risks of narcotics out weight any benefits at this time    Medical non-compliance (present on admission)  Assessment & Plan  - Counseled and educated    Asterixis (present on admission)  Assessment & Plan  - Uremic    Anemia (present on admission)  Assessment & Plan  - OF renal disease  - Detailed evaluation requested    ESRD (end stage renal disease) (CMS-HCC) (present on admission)  Assessment & Plan  - Consult to Dr Gotti    Leg weakness (present on admission)  Assessment & Plan  - RLE, below knee  - MRI lumbar  - DVT Duplex  - May be uremic  - If fails to improve neurology consult  - Neuro checks q 1 hourly    Low back pain (present on admission)  Assessment &  Plan  - Poor historian. Flank vs lumbar. Tender in both area  - Given fevers obtain MRI lumbar and CT abd / pelvis    HTN (hypertension) (present on admission)  Assessment & Plan  - Monitor. IV labetalol as needed.     Lupus (CMS-HCC) (present on admission)  Assessment & Plan  - Check ESR / CRP      VTE prophylaxis: SCD. SC heparin CI given uremia.        This patient is critically ill with a life threatening illness as noted above requiring my direct presence, involvement and maximal preparedness. I have personally spend 60 minutes providing critical care to this patient. Time spend on critical care excludes time spend on procedures.

## 2017-07-19 NOTE — ED NOTES
Repeat lactic acid level drawn per orders and sent to lab. Second IV antibiotic initiated per orders.

## 2017-07-20 ENCOUNTER — APPOINTMENT (OUTPATIENT)
Dept: RADIOLOGY | Facility: MEDICAL CENTER | Age: 37
DRG: 871 | End: 2017-07-20
Attending: INTERNAL MEDICINE
Payer: MEDICARE

## 2017-07-20 PROBLEM — R27.8 ASTERIXIS: Status: RESOLVED | Noted: 2017-07-19 | Resolved: 2017-07-20

## 2017-07-20 LAB
ABO GROUP BLD: NORMAL
ALBUMIN SERPL BCP-MCNC: 2.8 G/DL (ref 3.2–4.9)
ALBUMIN/GLOB SERPL: 1 G/DL
ALP SERPL-CCNC: 36 U/L (ref 30–99)
ALT SERPL-CCNC: 12 U/L (ref 2–50)
ANION GAP SERPL CALC-SCNC: 17 MMOL/L (ref 0–11.9)
AST SERPL-CCNC: 12 U/L (ref 12–45)
BARCODED ABORH UBTYP: 6200
BARCODED PRD CODE UBPRD: NORMAL
BARCODED UNIT NUM UBUNT: NORMAL
BASOPHILS # BLD AUTO: 0.4 % (ref 0–1.8)
BASOPHILS # BLD: 0.03 K/UL (ref 0–0.12)
BILIRUB SERPL-MCNC: 0.6 MG/DL (ref 0.1–1.5)
BLD GP AB INVEST PLASRBC-IMP: NORMAL
BLD GP AB SCN SERPL QL: NORMAL
BUN SERPL-MCNC: 55 MG/DL (ref 8–22)
CALCIUM SERPL-MCNC: 7.8 MG/DL (ref 8.5–10.5)
CHLORIDE SERPL-SCNC: 105 MMOL/L (ref 96–112)
CO2 SERPL-SCNC: 18 MMOL/L (ref 20–33)
COMPONENT R 8504R: NORMAL
COMPONENT R 8504R: NORMAL
CREAT SERPL-MCNC: 6.96 MG/DL (ref 0.5–1.4)
DAT C3D-SP REAG RBC QL: NORMAL
DAT IGG-SP REAG RBC QL: NORMAL
EOSINOPHIL # BLD AUTO: 0.14 K/UL (ref 0–0.51)
EOSINOPHIL NFR BLD: 1.7 % (ref 0–6.9)
ERYTHROCYTE [DISTWIDTH] IN BLOOD BY AUTOMATED COUNT: 48.1 FL (ref 35.9–50)
FERRITIN SERPL-MCNC: 376.4 NG/ML (ref 10–291)
FOLATE SERPL-MCNC: 13.2 NG/ML
GFR SERPL CREATININE-BSD FRML MDRD: 7 ML/MIN/1.73 M 2
GLOBULIN SER CALC-MCNC: 2.8 G/DL (ref 1.9–3.5)
GLUCOSE SERPL-MCNC: 83 MG/DL (ref 65–99)
HCT VFR BLD AUTO: 17.5 % (ref 37–47)
HGB BLD-MCNC: 5.5 G/DL (ref 12–16)
HGB RETIC QN AUTO: 26.2 PG/CELL (ref 29–35)
HGB RETIC QN AUTO: 26.9 PG/CELL (ref 29–35)
IMM GRANULOCYTES # BLD AUTO: 0.04 K/UL (ref 0–0.11)
IMM GRANULOCYTES NFR BLD AUTO: 0.5 % (ref 0–0.9)
IMM RETICS NFR: 17.2 % (ref 9.3–17.4)
IMM RETICS NFR: 22.2 % (ref 9.3–17.4)
IRON SATN MFR SERPL: 6 % (ref 15–55)
IRON SERPL-MCNC: 10 UG/DL (ref 40–170)
LDH SERPL-CCNC: 219 U/L (ref 107–266)
LYMPHOCYTES # BLD AUTO: 0.85 K/UL (ref 1–4.8)
LYMPHOCYTES NFR BLD: 10.5 % (ref 22–41)
MAGNESIUM SERPL-MCNC: 2.1 MG/DL (ref 1.5–2.5)
MCH RBC QN AUTO: 30.6 PG (ref 27–33)
MCHC RBC AUTO-ENTMCNC: 31.4 G/DL (ref 33.6–35)
MCV RBC AUTO: 97.2 FL (ref 81.4–97.8)
MONOCYTES # BLD AUTO: 0.71 K/UL (ref 0–0.85)
MONOCYTES NFR BLD AUTO: 8.8 % (ref 0–13.4)
NEUTROPHILS # BLD AUTO: 6.32 K/UL (ref 2–7.15)
NEUTROPHILS NFR BLD: 78.1 % (ref 44–72)
NRBC # BLD AUTO: 0 K/UL
NRBC BLD AUTO-RTO: 0 /100 WBC
PHOSPHATE SERPL-MCNC: 6.8 MG/DL (ref 2.5–4.5)
PLATELET # BLD AUTO: 142 K/UL (ref 164–446)
PMV BLD AUTO: 11.1 FL (ref 9–12.9)
POTASSIUM SERPL-SCNC: 3.4 MMOL/L (ref 3.6–5.5)
PRODUCT TYPE UPROD: NORMAL
PROT SERPL-MCNC: 5.6 G/DL (ref 6–8.2)
RBC # BLD AUTO: 1.8 M/UL (ref 4.2–5.4)
RETICS # AUTO: 0.04 M/UL (ref 0.04–0.06)
RETICS # AUTO: 0.04 M/UL (ref 0.04–0.06)
RETICS/RBC NFR: 2 % (ref 0.8–2.1)
RETICS/RBC NFR: 2.2 % (ref 0.8–2.1)
RH BLD: NORMAL
SODIUM SERPL-SCNC: 140 MMOL/L (ref 135–145)
TIBC SERPL-MCNC: 154 UG/DL (ref 250–450)
TSH SERPL DL<=0.005 MIU/L-ACNC: 0.8 UIU/ML (ref 0.3–3.7)
UNIT STATUS USTAT: NORMAL
VIT B12 SERPL-MCNC: 805 PG/ML (ref 211–911)
WBC # BLD AUTO: 8.1 K/UL (ref 4.8–10.8)

## 2017-07-20 PROCEDURE — 82607 VITAMIN B-12: CPT

## 2017-07-20 PROCEDURE — 84100 ASSAY OF PHOSPHORUS: CPT

## 2017-07-20 PROCEDURE — 82270 OCCULT BLOOD FECES: CPT

## 2017-07-20 PROCEDURE — 99233 SBSQ HOSP IP/OBS HIGH 50: CPT | Performed by: HOSPITALIST

## 2017-07-20 PROCEDURE — 86225 DNA ANTIBODY NATIVE: CPT

## 2017-07-20 PROCEDURE — 84443 ASSAY THYROID STIM HORMONE: CPT

## 2017-07-20 PROCEDURE — 83540 ASSAY OF IRON: CPT

## 2017-07-20 PROCEDURE — 700102 HCHG RX REV CODE 250 W/ 637 OVERRIDE(OP): Performed by: INTERNAL MEDICINE

## 2017-07-20 PROCEDURE — 86038 ANTINUCLEAR ANTIBODIES: CPT

## 2017-07-20 PROCEDURE — 85046 RETICYTE/HGB CONCENTRATE: CPT

## 2017-07-20 PROCEDURE — 83735 ASSAY OF MAGNESIUM: CPT

## 2017-07-20 PROCEDURE — 700105 HCHG RX REV CODE 258: Performed by: INTERNAL MEDICINE

## 2017-07-20 PROCEDURE — 85025 COMPLETE CBC W/AUTO DIFF WBC: CPT

## 2017-07-20 PROCEDURE — 86901 BLOOD TYPING SEROLOGIC RH(D): CPT

## 2017-07-20 PROCEDURE — 86905 BLOOD TYPING RBC ANTIGENS: CPT

## 2017-07-20 PROCEDURE — 83615 LACTATE (LD) (LDH) ENZYME: CPT

## 2017-07-20 PROCEDURE — 700105 HCHG RX REV CODE 258: Performed by: HOSPITALIST

## 2017-07-20 PROCEDURE — 90935 HEMODIALYSIS ONE EVALUATION: CPT

## 2017-07-20 PROCEDURE — 83550 IRON BINDING TEST: CPT

## 2017-07-20 PROCEDURE — 700111 HCHG RX REV CODE 636 W/ 250 OVERRIDE (IP): Performed by: HOSPITALIST

## 2017-07-20 PROCEDURE — 700111 HCHG RX REV CODE 636 W/ 250 OVERRIDE (IP): Performed by: INTERNAL MEDICINE

## 2017-07-20 PROCEDURE — 82955 ASSAY OF G6PD ENZYME: CPT

## 2017-07-20 PROCEDURE — 86235 NUCLEAR ANTIGEN ANTIBODY: CPT | Mod: 91

## 2017-07-20 PROCEDURE — 700102 HCHG RX REV CODE 250 W/ 637 OVERRIDE(OP): Performed by: HOSPITALIST

## 2017-07-20 PROCEDURE — 86880 COOMBS TEST DIRECT: CPT | Mod: 91

## 2017-07-20 PROCEDURE — 86870 RBC ANTIBODY IDENTIFICATION: CPT

## 2017-07-20 PROCEDURE — 80053 COMPREHEN METABOLIC PANEL: CPT

## 2017-07-20 PROCEDURE — 82746 ASSAY OF FOLIC ACID SERUM: CPT

## 2017-07-20 PROCEDURE — A9270 NON-COVERED ITEM OR SERVICE: HCPCS | Performed by: HOSPITALIST

## 2017-07-20 PROCEDURE — 83010 ASSAY OF HAPTOGLOBIN QUANT: CPT

## 2017-07-20 PROCEDURE — 86850 RBC ANTIBODY SCREEN: CPT

## 2017-07-20 PROCEDURE — 770022 HCHG ROOM/CARE - ICU (200)

## 2017-07-20 PROCEDURE — A9270 NON-COVERED ITEM OR SERVICE: HCPCS | Performed by: INTERNAL MEDICINE

## 2017-07-20 PROCEDURE — 82728 ASSAY OF FERRITIN: CPT

## 2017-07-20 PROCEDURE — 86900 BLOOD TYPING SEROLOGIC ABO: CPT

## 2017-07-20 RX ORDER — LORAZEPAM 2 MG/ML
1 INJECTION INTRAMUSCULAR ONCE
Status: DISCONTINUED | OUTPATIENT
Start: 2017-07-20 | End: 2017-07-20

## 2017-07-20 RX ORDER — LORAZEPAM 2 MG/ML
3 INJECTION INTRAMUSCULAR ONCE
Status: COMPLETED | OUTPATIENT
Start: 2017-07-21 | End: 2017-07-21

## 2017-07-20 RX ORDER — LORAZEPAM 2 MG/ML
5 INJECTION INTRAMUSCULAR ONCE
Status: COMPLETED | OUTPATIENT
Start: 2017-07-21 | End: 2017-07-21

## 2017-07-20 RX ORDER — LORAZEPAM 2 MG/ML
2 INJECTION INTRAMUSCULAR ONCE
Status: DISCONTINUED | OUTPATIENT
Start: 2017-07-20 | End: 2017-07-20

## 2017-07-20 RX ORDER — LORAZEPAM 2 MG/ML
2 INJECTION INTRAMUSCULAR ONCE
Status: COMPLETED | OUTPATIENT
Start: 2017-07-21 | End: 2017-07-21

## 2017-07-20 RX ORDER — LORAZEPAM 2 MG/ML
5 INJECTION INTRAMUSCULAR ONCE
Status: DISCONTINUED | OUTPATIENT
Start: 2017-07-20 | End: 2017-07-20

## 2017-07-20 RX ORDER — SODIUM CHLORIDE 9 MG/ML
INJECTION, SOLUTION INTRAVENOUS
Status: ACTIVE
Start: 2017-07-20 | End: 2017-07-20

## 2017-07-20 RX ORDER — LORAZEPAM 2 MG/ML
4 INJECTION INTRAMUSCULAR ONCE
Status: DISCONTINUED | OUTPATIENT
Start: 2017-07-20 | End: 2017-07-20

## 2017-07-20 RX ORDER — LORAZEPAM 2 MG/ML
1 INJECTION INTRAMUSCULAR ONCE
Status: COMPLETED | OUTPATIENT
Start: 2017-07-21 | End: 2017-07-21

## 2017-07-20 RX ORDER — LORAZEPAM 2 MG/ML
4 INJECTION INTRAMUSCULAR ONCE
Status: COMPLETED | OUTPATIENT
Start: 2017-07-21 | End: 2017-07-21

## 2017-07-20 RX ORDER — LORAZEPAM 2 MG/ML
3 INJECTION INTRAMUSCULAR ONCE
Status: DISCONTINUED | OUTPATIENT
Start: 2017-07-20 | End: 2017-07-20

## 2017-07-20 RX ORDER — HEPARIN SODIUM 1000 [USP'U]/ML
2000 INJECTION, SOLUTION INTRAVENOUS; SUBCUTANEOUS
Status: DISCONTINUED | OUTPATIENT
Start: 2017-07-20 | End: 2017-08-04 | Stop reason: HOSPADM

## 2017-07-20 RX ADMIN — OXYCODONE HYDROCHLORIDE 20 MG: 10 TABLET ORAL at 22:54

## 2017-07-20 RX ADMIN — HYDROXYCHLOROQUINE SULFATE 200 MG: 200 TABLET, FILM COATED ORAL at 21:57

## 2017-07-20 RX ADMIN — PIPERACILLIN AND TAZOBACTAM 2.25 G: 2; .25 INJECTION, POWDER, LYOPHILIZED, FOR SOLUTION INTRAVENOUS; PARENTERAL at 14:00

## 2017-07-20 RX ADMIN — OXYCODONE HYDROCHLORIDE 20 MG: 10 TABLET ORAL at 16:54

## 2017-07-20 RX ADMIN — PIPERACILLIN AND TAZOBACTAM 2.25 G: 2; .25 INJECTION, POWDER, LYOPHILIZED, FOR SOLUTION INTRAVENOUS; PARENTERAL at 22:00

## 2017-07-20 RX ADMIN — HEPARIN SODIUM 2000 UNITS: 1000 INJECTION, SOLUTION INTRAVENOUS; SUBCUTANEOUS at 09:58

## 2017-07-20 RX ADMIN — STANDARDIZED SENNA CONCENTRATE AND DOCUSATE SODIUM 2 TABLET: 8.6; 5 TABLET, FILM COATED ORAL at 21:57

## 2017-07-20 RX ADMIN — PIPERACILLIN AND TAZOBACTAM 2.25 G: 2; .25 INJECTION, POWDER, LYOPHILIZED, FOR SOLUTION INTRAVENOUS; PARENTERAL at 05:29

## 2017-07-20 RX ADMIN — IRON SUCROSE 200 MG: 20 INJECTION, SOLUTION INTRAVENOUS at 14:45

## 2017-07-20 ASSESSMENT — PAIN SCALES - GENERAL
PAINLEVEL_OUTOF10: 0
PAINLEVEL_OUTOF10: 9
PAINLEVEL_OUTOF10: 8
PAINLEVEL_OUTOF10: 9
PAINLEVEL_OUTOF10: 0
PAINLEVEL_OUTOF10: 9

## 2017-07-20 ASSESSMENT — ENCOUNTER SYMPTOMS
VOMITING: 0
CHILLS: 0
DIARRHEA: 0
LOSS OF CONSCIOUSNESS: 0
BACK PAIN: 1
NAUSEA: 0
DIZZINESS: 0
HEADACHES: 0
COUGH: 0
ABDOMINAL PAIN: 0
SHORTNESS OF BREATH: 0
FEVER: 0

## 2017-07-20 NOTE — PROGRESS NOTES
Bedside report received from daylight RN.  All lines verified.  Pt assessed.  A&Ox4 but slurring words and really unable to verbalize her needs appropriately.  VS stable.  Will continue to closely monitor.

## 2017-07-20 NOTE — PROGRESS NOTES
"Pharmacy Kinetics 36 y.o. female on vancomycin day # 2 2017    Currently on Vancomycin pulse dosing   2000 mg IV loading dose  @ 0856    Indication for Treatment: unknown source of infection    Pertinent history per medical record: Admitted on 2017 for complaints of fever at home (x 48 hours prior to arrival with Tmax reportedly 103) and RLE pain. Patient is non-weight bearing at home.Patient states that she is ESRD on HD, but has been without HD treatment for more than a month due to issues in trying to switch over to peritoneal dialysis.  Pt with right AV fistula in place. History of recurrent UTIs. In the ED, she was found to be hypertensive and tachycardiac with a fever of 101.7. Empiric abx have been intiatied.     Other antibiotics: Zosyn 2.25 g IV q8h    Allergies: Morphine and Seasonal     List concerns for renal function: ESRD on IHD    Pertinent cultures to date:    PBC x2: NGTD    Recent Labs      17   0538  17   0645   WBC  11.9*  8.1   NEUTSPOLYS  73.40*  78.10*     Recent Labs      17   0538  17   0420   BUN  94*  55*   CREATININE  11.60*  6.96*   ALBUMIN  3.6  2.8*     Blood pressure 187/101, pulse 87, temperature 37.7 °C (99.9 °F), resp. rate 21, height 1.651 m (5' 5\"), weight 81.3 kg (179 lb 3.7 oz), SpO2 96 %. Temp (24hrs), Av.7 °C (99.8 °F), Min:37.6 °C (99.7 °F), Max:37.7 °C (99.9 °F)      A/P   1. Vancomycin dose change: continue pulse dosing  2. Next vancomycin level: ~2 days (not ordered)  3. Goal trough: ~16 mcg/mL  4. Comments: no definitive source of infection identified. Possible de-escalation at 48 hours if cultures remain negative. Will plan for a random level in 2 days if therapy to continue. Will continue to follow.    Tamica Weber, PHARMD      "

## 2017-07-20 NOTE — CARE PLAN
Problem: Knowledge Deficit  Goal: Knowledge of the prescribed therapeutic regimen will improve  Intervention: Discuss information regarding therpeutic regimen and document in education  Done

## 2017-07-20 NOTE — DIETARY
Nutrition Services: Poor appetite reported by RN during Rounds    Pt is a 36 y.o. Female with Dx: Uremic encephalopathy    PMH: ESRD secondary to lupus nephritis; pt has not had HD x6 weeks, wanting to transition to PD, but was declined by clinic due to noncompliance.   BMI= 29.83  Labs and meds reviewed  GI: BM 7/20  Skin: No breakdown noted    Poor appetite reported by RN during Rounds today 7/20. Pt is on a renal diet, ate 50-75% of breakfast per ADLs, but did not eat any lunch today per RD observation. S/p HD 7/19 and today 7/20. Pt was asleep this afternoon, so RD was unable to discuss nutritional needs. Also, pt did not wish to go over menu choices earlier today with dietary staff, so unclear what her food preferences are. Pt likely in need of nutrition supplements and potentially nutrition support.    Please place order for nutrition supplements.  RD following.

## 2017-07-20 NOTE — CARE PLAN
Problem: Safety  Goal: Will remain free from injury  Outcome: PROGRESSING AS EXPECTED  Assessed strength and mobility.  Staff present for mobilization.    Problem: Infection  Goal: Will remain free from infection  Outcome: PROGRESSING AS EXPECTED  Monitored labs and vital signs.  Assessed for signs of infection.

## 2017-07-20 NOTE — PROGRESS NOTES
Hospital Medicine Progress Note, Adult, Complex               Author: Spike ALMANZA Shen Date & Time created: 7/20/2017  1:45 PM     Interval History:  36-year-old female with end-stage renal disease secondary to lupus nephritis, who did not come to the dialysis unit for over a month and came in with scott uremia. The patient had her first dialysis yesterday, low flow and tolerated it relatively well. Her mental status is improved. Apparently she was planning on switching to peritoneal dialysis. She was evaluated by the peritoneal dialysis unit, but was not deemed an appropriate candidate due to noncompliance.    The patient was found to have uremic pericarditis as well.    Review of Systems:  Review of Systems   Constitutional: Negative for fever and chills.       Physical Exam:  Physical Exam   Constitutional: She appears well-developed and well-nourished.   Cardiovascular: Normal rate and regular rhythm.    Pulmonary/Chest: Effort normal and breath sounds normal.   Abdominal: Soft. Bowel sounds are normal.   Musculoskeletal: She exhibits no edema or tenderness.   Neurological: She is alert.   Skin: Skin is warm and dry.       Labs:        Invalid input(s): [id number]  Recent Labs      07/19/17 1248 07/19/17   1732   TROPONINI  0.23*  0.15*   BNPBTYPENAT  1827*   --      Recent Labs      07/19/17 0538 07/19/17 1248 07/20/17   0420   SODIUM  140   --   140   POTASSIUM  4.3   --   3.4*   CHLORIDE  105   --   105   CO2  12*   --   18*   BUN  94*   --   55*   CREATININE  11.60*   --   6.96*   MAGNESIUM   --   2.5  2.1   PHOSPHORUS   --   10.5*  6.8*   CALCIUM  9.1   --   7.8*     Recent Labs      07/19/17   0538  07/20/17   0420   ALTSGPT  14  12   ASTSGOT  23  12   ALKPHOSPHAT  47  36   TBILIRUBIN  0.6  0.6   GLUCOSE  80  83     Recent Labs      07/19/17 0538  07/19/17 1248  07/20/17   0420  07/20/17   0645   RBC  2.22*   --    --   1.80*   HEMOGLOBIN  6.9*   --    --   5.5*   HEMATOCRIT  21.8*   --     --   17.5*   PLATELETCT  177   --    --   142*   PROTHROMBTM   --   17.8*   --    --    APTT   --   54.9*   --    --    INR   --   1.42*   --    --    IRON   --    --   10*   --    FERRITIN   --    --   376.4*   --    TOTIRONBC   --    --   154*   --      Recent Labs      17   0538  17   0420  17   0645   WBC  11.9*   --   8.1   NEUTSPOLYS  73.40*   --   78.10*   LYMPHOCYTES  13.10*   --   10.50*   MONOCYTES  11.30   --   8.80   EOSINOPHILS  0.80   --   1.70   BASOPHILS  0.50   --   0.40   ASTSGOT  23  12   --    ALTSGPT  14  12   --    ALKPHOSPHAT  47  36   --    TBILIRUBIN  0.6  0.6   --            Hemodynamics:  Temp (24hrs), Av.7 °C (99.8 °F), Min:37.6 °C (99.7 °F), Max:37.7 °C (99.9 °F)  Temperature: 37.7 °C (99.9 °F)  Pulse  Av.1  Min: 81  Max: 132Heart Rate (Monitored): 86  NIBP: 117/64 mmHg     Respiratory:    Respiration: (!) 21, Pulse Oximetry: 96 %     Work Of Breathing / Effort: Mild  RUL Breath Sounds: Clear, RML Breath Sounds: Clear, RLL Breath Sounds: Diminished, LASHANDA Breath Sounds: Clear, LLL Breath Sounds: Diminished  Fluids:    Intake/Output Summary (Last 24 hours) at 17 1345  Last data filed at 17 1334   Gross per 24 hour   Intake   1400 ml   Output   2200 ml   Net   -800 ml     Weight: 81.3 kg (179 lb 3.7 oz)  GI/Nutrition:  Orders Placed This Encounter   Procedures   • DIET ORDER     Standing Status: Standing      Number of Occurrences: 1      Standing Expiration Date:      Order Specific Question:  Diet:     Answer:  Renal [8]     Medical Decision Making, by Problem:  Active Hospital Problems    Diagnosis   • Sepsis (CMS-HCC) [A41.9]   • Uremic encephalopathy [G93.41, N19]   • Pneumonia [J18.9]   • Acute hypoxemic respiratory failure (CMS-HCC) [J96.01]   • Asterixis [R27.8]   • Anemia [D64.9]   • ESRD (end stage renal disease) (CMS-HCC) [N18.6]   • Leg weakness [R29.898]   • Low back pain [M54.5]   • HTN (hypertension) [I10]   • Lupus (CMS-HCC) [M32.9]   •  Medical non-compliance [Z91.19]   • Narcotic dependence (CMS-HCC) [F11.20]     1. End-stage renal disease, noncompliance  2. Uremia due to noncompliance with dialysis  3. Uremic pericarditis  4. History of lupus nephritis  5. Anemia, markedly worse with a hemoglobin of 5.5,    -Patient needs dialysis again today  -Draw labs to evaluate anemia  -Patient will likely need blood transfusion  -Will need frequent HD to treat pericarditis  -We'll follow    Core Measures

## 2017-07-20 NOTE — CARE PLAN
Problem: Safety  Goal: Will remain free from falls  Outcome: PROGRESSING AS EXPECTED  Bed alarm activated.  Staff assists with mobilization.    Problem: Bowel/Gastric:  Goal: Normal bowel function is maintained or improved  Outcome: PROGRESSING AS EXPECTED  Assessed bowel pattern.   Stool softeners given as ordered.

## 2017-07-20 NOTE — DISCHARGE PLANNING
Care Transition Team Assessment    IHD met with patient's grandmother as patient was occupied with medical staff. Patient's grandmother Shruthi stated that the patient lives alone, but is able to drive herself. They share a car which can complicate transportation. We discussed MTM services and their brochure was given. Shruthi stated the patient uses a cane occasionally and has a wheelchair in the car for longer outings. She did not have any previous services or O2 and Shruthi does not believe she will need to go home with any.     Information Source  Orientation : Oriented x 4  Information Given By: Patient  Informant's Name: Debby  Who is responsible for making decisions for patient? : Patient    Readmission Evaluation  Is this a readmission?: No    Elopement Risk  Legal Hold: No  Ambulatory or Self Mobile in Wheelchair: No-Not an Elopement Risk  Elopement Risk: Not at Risk for Elopement    Interdisciplinary Discharge Planning  Patient or legal guardian wants to designate a caregiver (see row info): No    Discharge Preparedness  What is your plan after discharge?: Home with help  What are your discharge supports?: Grandparent  Prior Functional Level: Drives Self, Independent with Activities of Daily Living, Independent with Medication Management, Uses Cane, Ambulatory  Difficulity with ADLs: Walking  Difficulty with ADLs Comment: Has cane and wheelchair as needed  Difficulity with IADLs: None    Functional Assesment  Prior Functional Level: Drives Self, Independent with Activities of Daily Living, Independent with Medication Management, Uses Cane, Ambulatory    Finances  Financial Barriers to Discharge: No  Prescription Coverage: Yes ([facility] on [facility] and [facility] near [place])                   Domestic Abuse  Have you ever been the victim of abuse or violence?: No    Psychological Assessment  History of Substance Abuse: None  History of Psychiatric Problems: No  Non-compliant with Treatment: No  Newly Diagnosed  Illness: No    Discharge Risks or Barriers  Discharge risks or barriers?: Transportation    Anticipated Discharge Information  Anticipated discharge disposition: Discharge needs currently unknown  Discharge Address: [address] [place] [id number]  Discharge Contact Phone Number: [phone]

## 2017-07-20 NOTE — CARE PLAN
Problem: Nutritional:  Goal: Achieve adequate nutritional intake  Patient will consume 50% of meals and supplements.  Outcome: PROGRESSING SLOWER THAN EXPECTED

## 2017-07-20 NOTE — DISCHARGE PLANNING
Per Lashawn at [facility], pt has not been to her dialysis tx's for over a month and has been discharged from the clinic due to noncompliance.  Pt will need OP dialysis clinic arranged prior to DC.

## 2017-07-20 NOTE — PROGRESS NOTES
Hemodialysis treatment ordered today per [facility] x 2 hours. Treatment initiated at 1631, ended at 1831.     Patient tolerated tx; see paper flow sheet for details.     Net UF 0 mL.     Needles removed from access site. Dressings applied and sites held x 10 minutes; verified no bleeding. Positive bruit/thrill post tx. Staff RN to monitor AVG for breakthrough bleeding. Should breakthrough bleeding occur, staff RN to apply pressure to access sites until bleeding resolved. Notify Dialysis and Nephrologist for follow-up.    Report given to Primary RN.

## 2017-07-20 NOTE — CARE PLAN
Problem: Knowledge Deficit  Goal: Knowledge of disease process/condition, treatment plan, diagnostic tests, and medications will improve  Outcome: PROGRESSING SLOWER THAN EXPECTED  Intervention: Assess knowledge level of disease process/condition, treatment plan, diagnostic tests, and medications  done  Intervention: Explain information regarding disease process/condition, treatment plan, diagnostic tests, and medications and document in education  done  Intervention: Discuss cultural and [employer] beliefs that influence knowledge of disease process/condition, treatment plan, diagnostic tests, and medications  Done

## 2017-07-20 NOTE — PROGRESS NOTES
Hd treatment ordered by Dr Gotti started at 0958 and ended at 1301 with net uf of 1000 ml as tolerated. Stable BP entire treatment with no significant change in condition post treatment. No bleeding at access site post treatment. See flow sheet for details.

## 2017-07-21 LAB
ANION GAP SERPL CALC-SCNC: 15 MMOL/L (ref 0–11.9)
BACTERIA UR CULT: NORMAL
BASOPHILS # BLD AUTO: 0.5 % (ref 0–1.8)
BASOPHILS # BLD: 0.03 K/UL (ref 0–0.12)
BUN SERPL-MCNC: 29 MG/DL (ref 8–22)
CALCIUM SERPL-MCNC: 7.9 MG/DL (ref 8.5–10.5)
CHLORIDE SERPL-SCNC: 107 MMOL/L (ref 96–112)
CO2 SERPL-SCNC: 21 MMOL/L (ref 20–33)
CREAT SERPL-MCNC: 4.56 MG/DL (ref 0.5–1.4)
EOSINOPHIL # BLD AUTO: 0.29 K/UL (ref 0–0.51)
EOSINOPHIL NFR BLD: 4.7 % (ref 0–6.9)
ERYTHROCYTE [DISTWIDTH] IN BLOOD BY AUTOMATED COUNT: 48.6 FL (ref 35.9–50)
GFR SERPL CREATININE-BSD FRML MDRD: 11 ML/MIN/1.73 M 2
GLUCOSE SERPL-MCNC: 85 MG/DL (ref 65–99)
HAPTOGLOB SERPL-MCNC: 236 MG/DL (ref 30–200)
HCT VFR BLD AUTO: 19.3 % (ref 37–47)
HEMOCCULT SP1 STL QL: POSITIVE
HGB BLD-MCNC: 6.1 G/DL (ref 12–16)
IMM GRANULOCYTES # BLD AUTO: 0.04 K/UL (ref 0–0.11)
IMM GRANULOCYTES NFR BLD AUTO: 0.6 % (ref 0–0.9)
LYMPHOCYTES # BLD AUTO: 0.92 K/UL (ref 1–4.8)
LYMPHOCYTES NFR BLD: 14.9 % (ref 22–41)
MAGNESIUM SERPL-MCNC: 2 MG/DL (ref 1.5–2.5)
MCH RBC QN AUTO: 30.7 PG (ref 27–33)
MCHC RBC AUTO-ENTMCNC: 31.6 G/DL (ref 33.6–35)
MCV RBC AUTO: 97 FL (ref 81.4–97.8)
MONOCYTES # BLD AUTO: 0.78 K/UL (ref 0–0.85)
MONOCYTES NFR BLD AUTO: 12.6 % (ref 0–13.4)
NEUTROPHILS # BLD AUTO: 4.13 K/UL (ref 2–7.15)
NEUTROPHILS NFR BLD: 66.7 % (ref 44–72)
NRBC # BLD AUTO: 0 K/UL
NRBC BLD AUTO-RTO: 0 /100 WBC
PHOSPHATE SERPL-MCNC: 3.9 MG/DL (ref 2.5–4.5)
PLATELET # BLD AUTO: 163 K/UL (ref 164–446)
PMV BLD AUTO: 10.8 FL (ref 9–12.9)
POTASSIUM SERPL-SCNC: 3.1 MMOL/L (ref 3.6–5.5)
PROCALCITONIN SERPL-MCNC: 28.45 NG/ML
RBC # BLD AUTO: 1.99 M/UL (ref 4.2–5.4)
SIGNIFICANT IND 70042: NORMAL
SITE SITE: NORMAL
SODIUM SERPL-SCNC: 143 MMOL/L (ref 135–145)
SOURCE SOURCE: NORMAL
VANCOMYCIN SERPL-MCNC: 13.3 UG/ML
WBC # BLD AUTO: 6.2 K/UL (ref 4.8–10.8)

## 2017-07-21 PROCEDURE — 84100 ASSAY OF PHOSPHORUS: CPT

## 2017-07-21 PROCEDURE — 700105 HCHG RX REV CODE 258: Performed by: HOSPITALIST

## 2017-07-21 PROCEDURE — 83735 ASSAY OF MAGNESIUM: CPT

## 2017-07-21 PROCEDURE — 86902 BLOOD TYPE ANTIGEN DONOR EA: CPT | Mod: 91

## 2017-07-21 PROCEDURE — 85025 COMPLETE CBC W/AUTO DIFF WBC: CPT

## 2017-07-21 PROCEDURE — 86922 COMPATIBILITY TEST ANTIGLOB: CPT | Mod: 91

## 2017-07-21 PROCEDURE — 700101 HCHG RX REV CODE 250: Performed by: INTERNAL MEDICINE

## 2017-07-21 PROCEDURE — 700105 HCHG RX REV CODE 258

## 2017-07-21 PROCEDURE — 302084 SET,INFUSION NEEDLE 20 X 1": Performed by: HOSPITALIST

## 2017-07-21 PROCEDURE — 700111 HCHG RX REV CODE 636 W/ 250 OVERRIDE (IP): Performed by: HOSPITALIST

## 2017-07-21 PROCEDURE — 700111 HCHG RX REV CODE 636 W/ 250 OVERRIDE (IP)

## 2017-07-21 PROCEDURE — 700111 HCHG RX REV CODE 636 W/ 250 OVERRIDE (IP): Performed by: INTERNAL MEDICINE

## 2017-07-21 PROCEDURE — 99233 SBSQ HOSP IP/OBS HIGH 50: CPT | Performed by: HOSPITALIST

## 2017-07-21 PROCEDURE — 700105 HCHG RX REV CODE 258: Performed by: INTERNAL MEDICINE

## 2017-07-21 PROCEDURE — 36430 TRANSFUSION BLD/BLD COMPNT: CPT

## 2017-07-21 PROCEDURE — 80048 BASIC METABOLIC PNL TOTAL CA: CPT

## 2017-07-21 PROCEDURE — 84145 PROCALCITONIN (PCT): CPT

## 2017-07-21 PROCEDURE — 770020 HCHG ROOM/CARE - TELE (206)

## 2017-07-21 PROCEDURE — P9016 RBC LEUKOCYTES REDUCED: HCPCS

## 2017-07-21 PROCEDURE — 80202 ASSAY OF VANCOMYCIN: CPT

## 2017-07-21 PROCEDURE — 90935 HEMODIALYSIS ONE EVALUATION: CPT

## 2017-07-21 RX ORDER — POTASSIUM CHLORIDE 20 MEQ/1
40 TABLET, EXTENDED RELEASE ORAL ONCE
Status: ACTIVE | OUTPATIENT
Start: 2017-07-21 | End: 2017-07-22

## 2017-07-21 RX ORDER — SODIUM CHLORIDE 9 MG/ML
INJECTION, SOLUTION INTRAVENOUS
Status: DISCONTINUED
Start: 2017-07-21 | End: 2017-07-21

## 2017-07-21 RX ORDER — DIPHENHYDRAMINE HYDROCHLORIDE 50 MG/ML
25 INJECTION INTRAMUSCULAR; INTRAVENOUS EVERY 6 HOURS PRN
Status: DISCONTINUED | OUTPATIENT
Start: 2017-07-21 | End: 2017-08-04 | Stop reason: HOSPADM

## 2017-07-21 RX ORDER — SODIUM CHLORIDE 9 MG/ML
INJECTION, SOLUTION INTRAVENOUS
Status: COMPLETED
Start: 2017-07-21 | End: 2017-07-21

## 2017-07-21 RX ORDER — HALOPERIDOL 5 MG/ML
5 INJECTION INTRAMUSCULAR EVERY 4 HOURS PRN
Status: DISCONTINUED | OUTPATIENT
Start: 2017-07-21 | End: 2017-08-04 | Stop reason: HOSPADM

## 2017-07-21 RX ORDER — HALOPERIDOL 5 MG/ML
5 INJECTION INTRAMUSCULAR ONCE
Status: COMPLETED | OUTPATIENT
Start: 2017-07-21 | End: 2017-07-21

## 2017-07-21 RX ADMIN — HALOPERIDOL LACTATE 5 MG: 5 INJECTION, SOLUTION INTRAMUSCULAR at 07:23

## 2017-07-21 RX ADMIN — PIPERACILLIN AND TAZOBACTAM 2.25 G: 2; .25 INJECTION, POWDER, LYOPHILIZED, FOR SOLUTION INTRAVENOUS; PARENTERAL at 22:48

## 2017-07-21 RX ADMIN — DIPHENHYDRAMINE HYDROCHLORIDE 25 MG: 50 INJECTION, SOLUTION INTRAMUSCULAR; INTRAVENOUS at 16:21

## 2017-07-21 RX ADMIN — LABETALOL HYDROCHLORIDE 10 MG: 5 INJECTION, SOLUTION INTRAVENOUS at 22:48

## 2017-07-21 RX ADMIN — LABETALOL HYDROCHLORIDE 10 MG: 5 INJECTION, SOLUTION INTRAVENOUS at 02:27

## 2017-07-21 RX ADMIN — DIPHENHYDRAMINE HYDROCHLORIDE 25 MG: 50 INJECTION, SOLUTION INTRAMUSCULAR; INTRAVENOUS at 22:58

## 2017-07-21 RX ADMIN — ONDANSETRON 4 MG: 4 TABLET, ORALLY DISINTEGRATING ORAL at 03:53

## 2017-07-21 RX ADMIN — HALOPERIDOL LACTATE 5 MG: 5 INJECTION, SOLUTION INTRAMUSCULAR at 22:58

## 2017-07-21 RX ADMIN — PIPERACILLIN AND TAZOBACTAM 2.25 G: 2; .25 INJECTION, POWDER, LYOPHILIZED, FOR SOLUTION INTRAVENOUS; PARENTERAL at 16:11

## 2017-07-21 RX ADMIN — SODIUM CHLORIDE: 9 INJECTION, SOLUTION INTRAVENOUS at 18:15

## 2017-07-21 RX ADMIN — LORAZEPAM 2 MG: 2 INJECTION INTRAMUSCULAR; INTRAVENOUS at 00:47

## 2017-07-21 RX ADMIN — HALOPERIDOL LACTATE 5 MG: 5 INJECTION, SOLUTION INTRAMUSCULAR at 16:21

## 2017-07-21 RX ADMIN — VANCOMYCIN HYDROCHLORIDE 1200 MG: 100 INJECTION, POWDER, LYOPHILIZED, FOR SOLUTION INTRAVENOUS at 16:11

## 2017-07-21 RX ADMIN — HALOPERIDOL LACTATE 5 MG: 5 INJECTION, SOLUTION INTRAMUSCULAR at 02:00

## 2017-07-21 RX ADMIN — LABETALOL HYDROCHLORIDE 10 MG: 5 INJECTION, SOLUTION INTRAVENOUS at 06:09

## 2017-07-21 RX ADMIN — IRON SUCROSE 200 MG: 20 INJECTION, SOLUTION INTRAVENOUS at 09:00

## 2017-07-21 RX ADMIN — PIPERACILLIN AND TAZOBACTAM 2.25 G: 2; .25 INJECTION, POWDER, LYOPHILIZED, FOR SOLUTION INTRAVENOUS; PARENTERAL at 06:10

## 2017-07-21 RX ADMIN — HEPARIN SODIUM 2000 UNITS: 1000 INJECTION, SOLUTION INTRAVENOUS; SUBCUTANEOUS at 12:42

## 2017-07-21 ASSESSMENT — ENCOUNTER SYMPTOMS
BACK PAIN: 1
COUGH: 0
ABDOMINAL PAIN: 0
CHILLS: 0
FEVER: 0
NAUSEA: 0
LOSS OF CONSCIOUSNESS: 0
DIARRHEA: 0
HEADACHES: 0
DIZZINESS: 0
VOMITING: 0
SHORTNESS OF BREATH: 0

## 2017-07-21 ASSESSMENT — PAIN SCALES - GENERAL
PAINLEVEL_OUTOF10: 0
PAINLEVEL_OUTOF10: 5
PAINLEVEL_OUTOF10: 0

## 2017-07-21 ASSESSMENT — LIFESTYLE VARIABLES: DO YOU DRINK ALCOHOL: NO

## 2017-07-21 NOTE — PROGRESS NOTES
[facility] Hospitalist Progress Note    Date of Service: 2017    Chief Complaint  36 y.o. female admitted 2017 with ESRD previously on HD however stopped one month ago as she wishes to be on PD.  Presented for evaluation of F    Interval Problem Update  Pt feeling better.  Still having pain in her R leg blelow the knee, this is presnt now for about one week    Consultants/Specialty  Nephrology    Disposition  Cont in ICU        Review of Systems   Constitutional: Negative for fever and chills.   Respiratory: Negative for cough and shortness of breath.    Cardiovascular: Positive for leg swelling. Negative for chest pain.   Gastrointestinal: Negative for nausea, vomiting, abdominal pain and diarrhea.   Musculoskeletal: Positive for back pain.        R leg pain   Skin: Negative for rash.   Neurological: Negative for dizziness, loss of consciousness and headaches.      Physical Exam  Laboratory/Imaging   Hemodynamics  Temp (24hrs), Av.7 °C (99.8 °F), Min:37.6 °C (99.7 °F), Max:37.7 °C (99.9 °F)   Temperature: 37.6 °C (99.7 °F)  Pulse  Av.1  Min: 81  Max: 132 Heart Rate (Monitored): 99  NIBP: 133/73 mmHg      Respiratory      Respiration: (!) 22, Pulse Oximetry: 96 %     Work Of Breathing / Effort: Mild  RUL Breath Sounds: Clear, RML Breath Sounds: Clear, RLL Breath Sounds: Diminished, LASHANDA Breath Sounds: Clear, LLL Breath Sounds: Diminished    Fluids    Intake/Output Summary (Last 24 hours) at 17 1709  Last data filed at 17 1334   Gross per 24 hour   Intake   1500 ml   Output   2300 ml   Net   -800 ml       Nutrition  Orders Placed This Encounter   Procedures   • DIET ORDER     Standing Status: Standing      Number of Occurrences: 1      Standing Expiration Date:      Order Specific Question:  Diet:     Answer:  Renal [8]     Physical Exam   Constitutional: She is oriented to person, place, and time. She appears well-developed and well-nourished. No distress.   HENT:   Head: Normocephalic and  atraumatic.   Neck: No JVD present.   Cardiovascular: Normal rate and regular rhythm.    Murmur heard.  Pulmonary/Chest: Effort normal. No stridor. No respiratory distress. She has no wheezes. She has rales.   Abdominal: Soft. There is no tenderness. There is no rebound and no guarding.   Musculoskeletal: She exhibits edema.   2/5  PF and DF, 4/5 at the knee   Neurological: She is oriented to person, place, and time.   Skin: Skin is warm and dry. No rash noted. She is not diaphoretic.   Psychiatric: She has a normal mood and affect. Thought content normal.   Nursing note and vitals reviewed.      Recent Labs      07/19/17   0538  07/20/17   0645   WBC  11.9*  8.1   RBC  2.22*  1.80*   HEMOGLOBIN  6.9*  5.5*   HEMATOCRIT  21.8*  17.5*   MCV  98.2*  97.2   MCH  31.1  30.6   MCHC  31.7*  31.4*   RDW  48.1  48.1   PLATELETCT  177  142*   MPV  11.1  11.1     Recent Labs      07/19/17   0538  07/20/17   0420   SODIUM  140  140   POTASSIUM  4.3  3.4*   CHLORIDE  105  105   CO2  12*  18*   GLUCOSE  80  83   BUN  94*  55*   CREATININE  11.60*  6.96*   CALCIUM  9.1  7.8*     Recent Labs      07/19/17   1248   APTT  54.9*   INR  1.42*     Recent Labs      07/19/17   1248   BNPBTYPENAT  1827*              Assessment/Plan     Sepsis (CMS-[facility]) (present on admission)  Assessment & Plan  - This is sepsis (without associated acute organ dysfunction).   - SIRS given leukocytosis, fevers, tachycardia, tachypnea. Source presumed pulmonary  - Respiratory failure / Encephalopathy is not related to sepsis  - Not candidate for 30 ml / Kg IVF bolus. This will fluid overload patient and lead to respiratory failure and intubation for sure  - Source evaluation to continue. Obtain CT C/A/P. MRI lumbar spine.   - Sepsis protocol  - Nosocomial exposure. Dialysis history. IV vancomycin / Zosyn. De escalate as able.     Uremic encephalopathy (present on admission)  Assessment & Plan  Secondary to non compliance with HD  Nephrology  consulted  Improved mentation today    Pneumonia (present on admission)  Assessment & Plan  CT looks more like pulmonary edema  de-escalate antibiotics if cultures remain neg    Acute hypoxemic respiratory failure (CMS-HCC) (present on admission)  Assessment & Plan  Improved s/p HD  Cont to follow  Treat possible HCAP    Narcotic dependence (CMS-HCC) (present on admission)  Assessment & Plan  - Decrease regimen.   - Monitor for withdrawal if any  - Risks of narcotics out weight any benefits at this time    Medical non-compliance (present on admission)  Assessment & Plan  - Counseled and educated    Anemia (present on admission)  Assessment & Plan  Likely secondary to CKD stg IV  Checking Fe studies, retic count, guaiac    ESRD (end stage renal disease) (CMS-HCC) (present on admission)  Assessment & Plan  - Consult to Dr Gotti    Leg weakness (present on admission)  Assessment & Plan  - RLE, below knee  - MRI lumbar  - DVT Duplex  - May be uremic  - If fails to improve neurology consult  - Neuro checks q 1 hourly    Low back pain (present on admission)  Assessment & Plan  - Poor historian. Flank vs lumbar. Tender in both area  - Given fevers obtain MRI lumbar and CT abd / pelvis    HTN (hypertension) (present on admission)  Assessment & Plan  - Monitor. IV labetalol as needed.     Lupus (CMS-HCC) (present on admission)  Assessment & Plan  - Check ESR / CRP      Radiology images reviewed, EKG reviewed, Labs reviewed and Medications reviewed  Taylor catheter: No Taylor        DVT prophylaxis - mechanical: SCDs  Ulcer prophylaxis: Not indicated  Antibiotics: Treating active infection/contamination beyond 24 hours perioperative coverage

## 2017-07-21 NOTE — PROGRESS NOTES
Hospital Medicine Progress Note, Adult, Complex               Author: Spike ALMANZA Shen Date & Time created: 7/21/2017  2:57 PM     Interval History:  36-year-old female with end-stage renal disease secondary to lupus nephritis, who did not come to the dialysis unit for over a month and came in with scott uremia. The patient had her first dialysis yesterday, low flow and tolerated it relatively well. Her mental status is improved. Apparently she was planning on switching to peritoneal dialysis. She was evaluated by the peritoneal dialysis unit, but was not deemed an appropriate candidate due to noncompliance.      Still confused but overall appears a bit better. On daily HD for now.    Review of Systems:  Review of Systems   Constitutional: Negative for fever and chills.       Physical Exam:  Physical Exam   Constitutional: She appears well-developed and well-nourished.   Cardiovascular: Normal rate and regular rhythm.    Pulmonary/Chest: Effort normal and breath sounds normal.   Abdominal: Soft. Bowel sounds are normal.   Musculoskeletal: She exhibits no edema or tenderness.   Neurological: She is alert.   Skin: Skin is warm and dry.       Labs:        Invalid input(s): [id number]  Recent Labs      07/19/17   1248  07/19/17   1732   TROPONINI  0.23*  0.15*   BNPBTYPENAT  1827*   --      Recent Labs      07/19/17 0538  07/19/17   1248 07/20/17   0420 07/21/17   0711   SODIUM  140   --   140  143   POTASSIUM  4.3   --   3.4*  3.1*   CHLORIDE  105   --   105  107   CO2  12*   --   18*  21   BUN  94*   --   55*  29*   CREATININE  11.60*   --   6.96*  4.56*   MAGNESIUM   --   2.5  2.1  2.0   PHOSPHORUS   --   10.5*  6.8*  3.9   CALCIUM  9.1   --   7.8*  7.9*     Recent Labs      07/19/17   0538  07/20/17   0420  07/21/17   0711   ALTSGPT  14  12   --    ASTSGOT  23  12   --    ALKPHOSPHAT  47  36   --    TBILIRUBIN  0.6  0.6   --    GLUCOSE  80  83  85     Recent Labs      07/19/17   0538  07/19/17   1248   17   0645  17   0711   RBC  2.22*   --    --   1.80*  1.99*   HEMOGLOBIN  6.9*   --    --   5.5*  6.1*   HEMATOCRIT  21.8*   --    --   17.5*  19.3*   PLATELETCT  177   --    --   142*  163*   PROTHROMBTM   --   17.8*   --    --    --    APTT   --   54.9*   --    --    --    INR   --   1.42*   --    --    --    IRON   --    --   10*   --    --    FERRITIN   --    --   376.4*   --    --    TOTIRONBC   --    --   154*   --    --      Recent Labs      17   0538  17   0645  17   0711   WBC  11.9*   --   8.1  6.2   NEUTSPOLYS  73.40*   --   78.10*  66.70   LYMPHOCYTES  13.10*   --   10.50*  14.90*   MONOCYTES  11.30   --   8.80  12.60   EOSINOPHILS  0.80   --   1.70  4.70   BASOPHILS  0.50   --   0.40  0.50   ASTSGOT  23  12   --    --    ALTSGPT  14  12   --    --    ALKPHOSPHAT  47  36   --    --    TBILIRUBIN  0.6  0.6   --    --            Hemodynamics:  Temp (24hrs), Av.2 °C (98.9 °F), Min:36.9 °C (98.4 °F), Max:37.7 °C (99.9 °F)  Temperature: 36.9 °C (98.5 °F)  Pulse  Av.2  Min: 58  Max: 132Heart Rate (Monitored): (!) 104  Blood Pressure: 159/82 mmHg, NIBP: (!) 166/86 mmHg     Respiratory:    Respiration: 16, Pulse Oximetry: 97 %     Work Of Breathing / Effort: Mild  RUL Breath Sounds: Clear, RML Breath Sounds: Diminished, RLL Breath Sounds: Diminished, LASHANDA Breath Sounds: Clear, LLL Breath Sounds: Diminished  Fluids:    Intake/Output Summary (Last 24 hours) at 17 1457  Last data filed at 17 0600   Gross per 24 hour   Intake    200 ml   Output    150 ml   Net     50 ml        GI/Nutrition:  Orders Placed This Encounter   Procedures   • DIET ORDER     Standing Status: Standing      Number of Occurrences: 1      Standing Expiration Date:      Order Specific Question:  Diet:     Answer:  Renal [8]     Medical Decision Making, by Problem:  Active Hospital Problems    Diagnosis   • Sepsis (CMS-HCC) [A41.9]   • Uremic encephalopathy  [G93.41, N19]   • Pneumonia [J18.9]   • Acute hypoxemic respiratory failure (CMS-HCC) [J96.01]   • Asterixis [R27.8]   • Anemia [D64.9]   • ESRD (end stage renal disease) (CMS-HCC) [N18.6]   • Leg weakness [R29.898]   • Low back pain [M54.5]   • HTN (hypertension) [I10]   • Lupus (CMS-HCC) [M32.9]   • Medical non-compliance [Z91.19]   • Narcotic dependence (CMS-HCC) [F11.20]     1. End-stage renal disease, noncompliance  2. Uremia due to noncompliance with dialysis  3. Uremic pericarditis  4. History of lupus nephritis  5. Anemia, Hgb 6.1, on epogen         Core Measures

## 2017-07-21 NOTE — PROGRESS NOTES
Pt pulled out Cuadra needle and peripheral IV.  Pt continues to refuse cardiac monitor, blood pressure cuff, O2 probe and oxygen.  Dr. Mcguire paged.

## 2017-07-21 NOTE — PROGRESS NOTES
"Called to pt's room by RN.  Per RN, pt became uncooperative and paranoid during trip to MRI.  MRI not done and pt returned to floor.  Spoke with pt.  Pt actively hallucinating.  Stated to this RN, \"why are you not looking at me, your eyes are going all over\" and \"that is not my bed\".  Paged Hospitalist.  Received call back from Dr. Mcguire within 5 minutes.  Updated her on pt status.  Received order for 5 mg of haldol.  Per Dr. Mcguire if unable to administer haldol via IV, can administer the haldol IM.  "

## 2017-07-21 NOTE — PROGRESS NOTES
Bedside report received. Assumed patient care at this time.   Pt in no acute distress. VS WNL at this time.  All lines verified with previous  RN.  Bed alarm on.  Bed in low position.  Call light within reach.  Pt educated on safety precautions and oriented to the call light.  Will continue to monitor.

## 2017-07-21 NOTE — PROGRESS NOTES
Hemodialysis treatment ordered today per Dr. Gotti x 3 hours. Treatment initiated at 1242, ended at 1542.     Patient confused, 1 unit of PRBC transfused, UF goal met. tx; see paper flow sheet for details.     Net UF 1,100 mL.     Needles removed from access site. Dressings applied and sites held x 10 minutes; verified no bleeding. Positive bruit/thrill post tx. Staff RN to monitor AVG for breakthrough bleeding. Should breakthrough bleeding occur, staff RN to apply pressure to access sites until bleeding resolved. Notify Dialysis and Nephrologist for follow-up.    Report given to Primary RN.

## 2017-07-21 NOTE — CARE PLAN
Problem: Mobility  Goal: Risk for activity intolerance will decrease  Outcome: PROGRESSING SLOWER THAN EXPECTED  Pt still needs MRI.  She was unable to complete MRI despite 2mg IV Ativan.  Intervention: Assess and monitor signs of activity intolerance  done  Intervention: Provide rest periods  done  Intervention: Encourage patient to increase activity level in collaboration with Interdisciplinary Team  done

## 2017-07-21 NOTE — DISCHARGE PLANNING
Met with pt at bedside during dialysis to discuss OP dialysis.  Pt was very lethargic and requested that a dialysis coordinator return when she is feeling better.  Dialysis coordinator Mayra Sheikh [phone] will follow on Monday.

## 2017-07-21 NOTE — PROGRESS NOTES
"Patient attempting to climb out of bed.  Attempted to educate patient on need to stay in bed for safety.  Patient yelling \"I don't care\".  Patient able to state her name, but unable to answer other orientation questions.  Attempted to calm patient unsuccessfully. Soft wrist restraints reapplied.    "

## 2017-07-21 NOTE — PROGRESS NOTES
"Pharmacy Kinetics 36 y.o. female on vancomycin day # 3 2017    Currently on Vancomycin pulse dosing              2000 mg IV loading dose  @ 0856    Indication for Treatment: unknown source of infection    Pertinent history per medical record: Admitted on 2017 for complaints of fever at home (x 48 hours prior to arrival with Tmax reportedly 103) and RLE pain. Patient is non-weight bearing at home.Patient states that she is ESRD on HD, but has been without HD treatment for more than a month due to issues in trying to switch over to peritoneal dialysis.  Pt with right AV fistula in place. History of recurrent UTIs. In the ED, she was found to be hypertensive and tachycardiac with a fever of 101.7. Empiric abx have been intiatied.     Other antibiotics: Zosyn 2.25 g IV q8h    Allergies: Morphine and Seasonal     List concerns for renal function: ESRD on IHD    Pertinent cultures to date:     PBC x2: NGTD   urine: NGTD    Recent Labs      17   0538  17   0645  17   0711   WBC  11.9*  8.1  6.2   NEUTSPOLYS  73.40*  78.10*  66.70     Recent Labs      17   0538  17   0420  17   0711   BUN  94*  55*  29*   CREATININE  11.60*  6.96*  4.56*   ALBUMIN  3.6  2.8*   --      Recent Labs      17   1350   VANCORANDOM  13.3     Blood pressure 164/86, pulse 99, temperature 36.6 °C (97.9 °F), resp. rate 23, height 1.651 m (5' 5\"), weight 81.3 kg (179 lb 3.7 oz), SpO2 94 %. Temp (24hrs), Av.1 °C (98.8 °F), Min:36.6 °C (97.9 °F), Max:37.7 °C (99.9 °F)      A/P   1. Vancomycin dose change: pulse dose of 1200 mg x 1 today  2. Next vancomycin level: ~2-3 days (not yet ordered)  3. Goal trough: ~16 mcg/mL  4. Comments: random level resulted around therapeutic range, safe to pulse dose x 1 this afternoon. Will give 1200 mg IV x 1 dose (15 mg/kg) and plan for a level in a few days if therapy to continue. Cultures without growth. Will continue to follow.    Tamica Weber, " PHARMD

## 2017-07-21 NOTE — PROGRESS NOTES
[facility] Hospitalist Progress Note    Date of Service: 2017    Chief Complaint  36 y.o. female admitted 2017 with ESRD previously on HD however stopped one month ago as she wishes to be on PD.  Presented for evaluation of F    Interval Problem Update  Pt with no complaints today.  Overnight per RN was agitated and confused.  Given ativan and haldol at different points which seemed to worsen her sx's.    Consultants/Specialty  Nephrology    Disposition  OK to floor        Review of Systems   Constitutional: Negative for fever and chills.   Respiratory: Negative for cough and shortness of breath.    Cardiovascular: Positive for leg swelling. Negative for chest pain.   Gastrointestinal: Negative for nausea, vomiting, abdominal pain and diarrhea.   Musculoskeletal: Positive for back pain.        R leg pain   Skin: Negative for rash.   Neurological: Negative for dizziness, loss of consciousness and headaches.      Physical Exam  Laboratory/Imaging   Hemodynamics  Temp (24hrs), Av.2 °C (98.9 °F), Min:36.9 °C (98.4 °F), Max:37.7 °C (99.9 °F)   Temperature: 36.9 °C (98.5 °F)  Pulse  Av.2  Min: 58  Max: 132 Heart Rate (Monitored): (!) 104  Blood Pressure: 159/82 mmHg, NIBP: (!) 166/86 mmHg      Respiratory      Respiration: 16, Pulse Oximetry: 97 %     Work Of Breathing / Effort: Mild  RUL Breath Sounds: Clear, RML Breath Sounds: Diminished, RLL Breath Sounds: Diminished, LASHANDA Breath Sounds: Clear, LLL Breath Sounds: Diminished    Fluids    Intake/Output Summary (Last 24 hours) at 17 1457  Last data filed at 17 0600   Gross per 24 hour   Intake    200 ml   Output    150 ml   Net     50 ml       Nutrition  Orders Placed This Encounter   Procedures   • DIET ORDER     Standing Status: Standing      Number of Occurrences: 1      Standing Expiration Date:      Order Specific Question:  Diet:     Answer:  Renal [8]     Physical Exam   Constitutional: She is oriented to person, place, and time. She  appears well-developed and well-nourished. No distress.   HENT:   Head: Normocephalic and atraumatic.   Neck: No JVD present.   Cardiovascular: Normal rate and regular rhythm.    Murmur heard.  Pulmonary/Chest: Effort normal. No stridor. No respiratory distress. She has no wheezes. She has rales.   Abdominal: Soft. There is no tenderness. There is no rebound and no guarding.   Musculoskeletal: She exhibits edema.   2/5  PF and DF, 4/5 at the knee   Neurological: She is oriented to person, place, and time.   Skin: Skin is warm and dry. No rash noted. She is not diaphoretic.   Psychiatric: She has a normal mood and affect. Thought content normal.   Nursing note and vitals reviewed.      Recent Labs      07/19/17   0538  07/20/17   0645  07/21/17   0711   WBC  11.9*  8.1  6.2   RBC  2.22*  1.80*  1.99*   HEMOGLOBIN  6.9*  5.5*  6.1*   HEMATOCRIT  21.8*  17.5*  19.3*   MCV  98.2*  97.2  97.0   MCH  31.1  30.6  30.7   MCHC  31.7*  31.4*  31.6*   RDW  48.1  48.1  48.6   PLATELETCT  177  142*  163*   MPV  11.1  11.1  10.8     Recent Labs      07/19/17   0538  07/20/17   0420  07/21/17   0711   SODIUM  140  140  143   POTASSIUM  4.3  3.4*  3.1*   CHLORIDE  105  105  107   CO2  12*  18*  21   GLUCOSE  80  83  85   BUN  94*  55*  29*   CREATININE  11.60*  6.96*  4.56*   CALCIUM  9.1  7.8*  7.9*     Recent Labs      07/19/17   1248   APTT  54.9*   INR  1.42*     Recent Labs      07/19/17   1248   BNPBTYPENAT  1827*              Assessment/Plan     Sepsis (CMS-[facility]) (present on admission)  Assessment & Plan  - This is sepsis (without associated acute organ dysfunction).   - SIRS given leukocytosis, fevers, tachycardia, tachypnea. Source presumed pulmonary  - Respiratory failure / Encephalopathy is not related to sepsis  - Not candidate for 30 ml / Kg IVF bolus. This will fluid overload patient and lead to respiratory failure and intubation for sure  - Source evaluation to continue. Obtain CT C/A/P. MRI lumbar spine.   - Sepsis  protocol  - Nosocomial exposure. Dialysis history. IV vancomycin / Zosyn. De escalate as able.     Uremic encephalopathy (present on admission)  Assessment & Plan  Secondary to non compliance with HD  Nephrology consulted  Improved mentation today    Pneumonia (present on admission)  Assessment & Plan  CT looks more like pulmonary edema  de-escalate antibiotics if cultures remain neg    Acute hypoxemic respiratory failure (CMS-HCC) (present on admission)  Assessment & Plan  Improved s/p HD  Cont to follow  Treat possible HCAP    Narcotic dependence (CMS-HCC) (present on admission)  Assessment & Plan  - Decrease regimen.   - Monitor for withdrawal if any  - Risks of narcotics out weight any benefits at this time    Medical non-compliance (present on admission)  Assessment & Plan  - Counseled and educated    Anemia (present on admission)  Assessment & Plan  Likely secondary to CKD stg IV  Checking Fe studies, retic count, guaiac    ESRD (end stage renal disease) (CMS-HCC) (present on admission)  Assessment & Plan  - Consult to Dr Gotti    Leg weakness (present on admission)  Assessment & Plan  - RLE, below knee  - MRI lumbar  - DVT Duplex  - May be uremic  - If fails to improve neurology consult  - Neuro checks q 1 hourly    Low back pain (present on admission)  Assessment & Plan  - Poor historian. Flank vs lumbar. Tender in both area  - Given fevers obtain MRI lumbar and CT abd / pelvis    HTN (hypertension) (present on admission)  Assessment & Plan  - Monitor. IV labetalol as needed.     Lupus (CMS-HCC) (present on admission)  Assessment & Plan  - Check ESR / CRP      Radiology images reviewed, EKG reviewed, Labs reviewed and Medications reviewed  Taylor catheter: No Taylor        DVT prophylaxis - mechanical: SCDs  Ulcer prophylaxis: Not indicated  Antibiotics: Treating active infection/contamination beyond 24 hours perioperative coverage

## 2017-07-22 PROBLEM — N18.6 ESRD (END STAGE RENAL DISEASE) (HCC): Chronic | Status: ACTIVE | Noted: 2017-07-19

## 2017-07-22 LAB
ANION GAP SERPL CALC-SCNC: 18 MMOL/L (ref 0–11.9)
BASOPHILS # BLD AUTO: 0.8 % (ref 0–1.8)
BASOPHILS # BLD: 0.05 K/UL (ref 0–0.12)
BUN SERPL-MCNC: 17 MG/DL (ref 8–22)
CALCIUM SERPL-MCNC: 8.3 MG/DL (ref 8.5–10.5)
CHLORIDE SERPL-SCNC: 104 MMOL/L (ref 96–112)
CO2 SERPL-SCNC: 21 MMOL/L (ref 20–33)
CREAT SERPL-MCNC: 3.57 MG/DL (ref 0.5–1.4)
EOSINOPHIL # BLD AUTO: 0.2 K/UL (ref 0–0.51)
EOSINOPHIL NFR BLD: 3.2 % (ref 0–6.9)
ERYTHROCYTE [DISTWIDTH] IN BLOOD BY AUTOMATED COUNT: 50.7 FL (ref 35.9–50)
G6PD RBC-CCNC: 14.9 U/G HB (ref 9.9–16.6)
GFR SERPL CREATININE-BSD FRML MDRD: 15 ML/MIN/1.73 M 2
GLUCOSE BLD-MCNC: 72 MG/DL (ref 65–99)
GLUCOSE SERPL-MCNC: 69 MG/DL (ref 65–99)
HCT VFR BLD AUTO: 24.7 % (ref 37–47)
HGB BLD-MCNC: 8 G/DL (ref 12–16)
IMM GRANULOCYTES # BLD AUTO: 0.09 K/UL (ref 0–0.11)
IMM GRANULOCYTES NFR BLD AUTO: 1.5 % (ref 0–0.9)
LYMPHOCYTES # BLD AUTO: 1.24 K/UL (ref 1–4.8)
LYMPHOCYTES NFR BLD: 20 % (ref 22–41)
MCH RBC QN AUTO: 30.2 PG (ref 27–33)
MCHC RBC AUTO-ENTMCNC: 32.4 G/DL (ref 33.6–35)
MCV RBC AUTO: 93.2 FL (ref 81.4–97.8)
MONOCYTES # BLD AUTO: 0.75 K/UL (ref 0–0.85)
MONOCYTES NFR BLD AUTO: 12.1 % (ref 0–13.4)
NEUTROPHILS # BLD AUTO: 3.86 K/UL (ref 2–7.15)
NEUTROPHILS NFR BLD: 62.4 % (ref 44–72)
NRBC # BLD AUTO: 0.03 K/UL
NRBC BLD AUTO-RTO: 0.5 /100 WBC
PLATELET # BLD AUTO: 215 K/UL (ref 164–446)
PMV BLD AUTO: 10.9 FL (ref 9–12.9)
POTASSIUM SERPL-SCNC: 3.5 MMOL/L (ref 3.6–5.5)
RBC # BLD AUTO: 2.65 M/UL (ref 4.2–5.4)
SODIUM SERPL-SCNC: 143 MMOL/L (ref 135–145)
WBC # BLD AUTO: 6.2 K/UL (ref 4.8–10.8)

## 2017-07-22 PROCEDURE — 700111 HCHG RX REV CODE 636 W/ 250 OVERRIDE (IP): Performed by: INTERNAL MEDICINE

## 2017-07-22 PROCEDURE — A9270 NON-COVERED ITEM OR SERVICE: HCPCS | Performed by: INTERNAL MEDICINE

## 2017-07-22 PROCEDURE — 770020 HCHG ROOM/CARE - TELE (206)

## 2017-07-22 PROCEDURE — 700102 HCHG RX REV CODE 250 W/ 637 OVERRIDE(OP): Performed by: INTERNAL MEDICINE

## 2017-07-22 PROCEDURE — 99233 SBSQ HOSP IP/OBS HIGH 50: CPT | Performed by: INTERNAL MEDICINE

## 2017-07-22 PROCEDURE — 700101 HCHG RX REV CODE 250: Performed by: INTERNAL MEDICINE

## 2017-07-22 PROCEDURE — 80048 BASIC METABOLIC PNL TOTAL CA: CPT

## 2017-07-22 PROCEDURE — 700111 HCHG RX REV CODE 636 W/ 250 OVERRIDE (IP): Performed by: HOSPITALIST

## 2017-07-22 PROCEDURE — 82962 GLUCOSE BLOOD TEST: CPT

## 2017-07-22 PROCEDURE — 700105 HCHG RX REV CODE 258: Performed by: INTERNAL MEDICINE

## 2017-07-22 PROCEDURE — 700105 HCHG RX REV CODE 258: Performed by: HOSPITALIST

## 2017-07-22 PROCEDURE — 85025 COMPLETE CBC W/AUTO DIFF WBC: CPT

## 2017-07-22 PROCEDURE — 90935 HEMODIALYSIS ONE EVALUATION: CPT

## 2017-07-22 PROCEDURE — 306578 KIT,PORT ACCESS 20G X 1.5INCH: Performed by: INTERNAL MEDICINE

## 2017-07-22 PROCEDURE — 700101 HCHG RX REV CODE 250: Performed by: HOSPITALIST

## 2017-07-22 RX ORDER — LIDOCAINE AND PRILOCAINE 25; 25 MG/G; MG/G
CREAM TOPICAL PRN
Status: DISCONTINUED | OUTPATIENT
Start: 2017-07-22 | End: 2017-08-04 | Stop reason: HOSPADM

## 2017-07-22 RX ADMIN — HALOPERIDOL LACTATE 5 MG: 5 INJECTION, SOLUTION INTRAMUSCULAR at 03:11

## 2017-07-22 RX ADMIN — LABETALOL HYDROCHLORIDE 10 MG: 5 INJECTION, SOLUTION INTRAVENOUS at 13:21

## 2017-07-22 RX ADMIN — LABETALOL HYDROCHLORIDE 10 MG: 5 INJECTION, SOLUTION INTRAVENOUS at 09:08

## 2017-07-22 RX ADMIN — FAMOTIDINE 20 MG: 20 TABLET, FILM COATED ORAL at 09:08

## 2017-07-22 RX ADMIN — LABETALOL HYDROCHLORIDE 10 MG: 5 INJECTION, SOLUTION INTRAVENOUS at 17:35

## 2017-07-22 RX ADMIN — LIDOCAINE AND PRILOCAINE 1 APPLICATION: 25; 25 CREAM TOPICAL at 23:15

## 2017-07-22 RX ADMIN — PIPERACILLIN AND TAZOBACTAM 2.25 G: 2; .25 INJECTION, POWDER, LYOPHILIZED, FOR SOLUTION INTRAVENOUS; PARENTERAL at 07:38

## 2017-07-22 RX ADMIN — ONDANSETRON 4 MG: 2 INJECTION INTRAMUSCULAR; INTRAVENOUS at 03:35

## 2017-07-22 RX ADMIN — IRON SUCROSE 200 MG: 20 INJECTION, SOLUTION INTRAVENOUS at 09:11

## 2017-07-22 RX ADMIN — PIPERACILLIN AND TAZOBACTAM 2.25 G: 2; .25 INJECTION, POWDER, LYOPHILIZED, FOR SOLUTION INTRAVENOUS; PARENTERAL at 14:00

## 2017-07-22 ASSESSMENT — ENCOUNTER SYMPTOMS
COUGH: 0
FOCAL WEAKNESS: 1
FEVER: 0
CHILLS: 0
VOMITING: 0
BACK PAIN: 1
HEADACHES: 0
NAUSEA: 1
DIZZINESS: 0
DIARRHEA: 0
DEPRESSION: 1
ROS GI COMMENTS: POOR APPETITE
LOSS OF CONSCIOUSNESS: 0
SHORTNESS OF BREATH: 0
WEAKNESS: 1
SENSORY CHANGE: 1
ABDOMINAL PAIN: 0

## 2017-07-22 ASSESSMENT — COGNITIVE AND FUNCTIONAL STATUS - GENERAL
MOVING FROM LYING ON BACK TO SITTING ON SIDE OF FLAT BED: A LITTLE
STANDING UP FROM CHAIR USING ARMS: A LITTLE
TOILETING: A LITTLE
SUGGESTED CMS G CODE MODIFIER DAILY ACTIVITY: CI
WALKING IN HOSPITAL ROOM: A LOT
CLIMB 3 TO 5 STEPS WITH RAILING: A LOT
SUGGESTED CMS G CODE MODIFIER MOBILITY: CK
MOBILITY SCORE: 18
DAILY ACTIVITIY SCORE: 23

## 2017-07-22 ASSESSMENT — PAIN SCALES - GENERAL
PAINLEVEL_OUTOF10: 4
PAINLEVEL_OUTOF10: 7
PAINLEVEL_OUTOF10: 3

## 2017-07-22 NOTE — CARE PLAN
Problem: Safety  Goal: Will remain free from injury  Outcome: PROGRESSING AS EXPECTED  Shadia Burton Fall Risk Assessment:     Last Known Fall: Within the last month  Mobility: Immobilized/requires assist of one person  Medications: Cardiovascular or central nervous system meds  Mental Status/LOC/Awareness: Oriented to person and place  Toileting Needs: No needs  Volume/Electrolyte Status: Use of IV fluids/tube feeds  Communication/Sensory: No deficits  Behavior: Impulsiveness  Shadia Burton Fall Risk Total: 14  Fall Risk Level: MODERATE RISK    Universal Fall Precautions:  call light/belongings in reach, bed in low position and locked, wheelchairs and assistive devices out of sight, siderails up x 2, use non-slip footwear, adequate lighting, clutter free and spill free environment, educate on level of risk, educate to call for assistance    Fall Risk Level Interventions:    TRIAL (TELE 8, NEURO, MED GABBIE 5) Moderate Fall Risk Interventions  Place yellow fall risk ID band on patient: refused  Provide patient/family education based on risk assessment : completed  Educate patient/family to call staff for assistance when getting out of bed: completed  Place fall precaution signage outside patient door: completed  Utilize bed/chair fall alarm: completed     Patient Specific Interventions:     Medication: review medications with patient and family, assess for medications that can be discontinued or dosage decreased, limit combination of prn medications and assess need for orthostatic hypotension evaluation  Mental Status/LOC/Awareness: reorient patient, reinforce falls education and check on patient hourly  Toileting: monitor intake and output/use of appropriate interventions  Volume/Electrolyte Status: ensure patient remains hydrated, monitor abnormal lab values and ensure IV fluids are appropriate  Communication/Sensory: update plan of care on whiteboard, ensure proper positioning when transferrng/ambulating and ensure  patient has glasses/contacts and hearing aids/dentures  Behavioral: encourage patient to voice feelings, engage patient in daily activities, administer medication as ordered and instruct/reinforce fall program rationale  Mobility: schedule physical activity throughout the day, provide comfort measures during transport, dangle prior to standing, utilize bed/chair fall alarm, ensure bed is locked and in lowest position and instruct patient to exit bed on their strongest side    Problem: Mobility  Goal: Risk for activity intolerance will decrease  Outcome: PROGRESSING AS EXPECTED  Pt is ambulating up to bathroom with assistance of 1 person.

## 2017-07-22 NOTE — PROGRESS NOTES
Blood bank called and notified RN that they have received another unit of PRBC's for the pt. Pt has need for rare blood products. Will hold this unit in blood bank on reserve for pt. This RN reviewed all RN and Physican notes dated for 7/21 and 7/20. 1 unit of PRBC's was ordered on 7/20/17 for hgb 5.5 and then order was completed. No current orders to transfuse. All notes that address anemia do not mention transfusion, but monitor hgb and mention pt is on epogen as pt is a dialysis pt. RN will monitor AM labs this shift and notify MD if hgb is less than 7.

## 2017-07-22 NOTE — PROGRESS NOTES
Assumed pt care. Pt resting in bed, anxious/restless when attempted to wake her up for bedside report. Allowed to rest. Bed in lowest position, locked, and alarm activated. Call light within reach. SR on monitor.

## 2017-07-22 NOTE — PROGRESS NOTES
BS 72. Given 120mL orange juice with 5 packets sugar added. Also drank 120mL of cranberry juice that just arrived on her breakfast tray.

## 2017-07-22 NOTE — PROGRESS NOTES
Pt medicated with haldol (see MAR). While drawing pt's labwork, she pulled off power port dressing and rolled over multiple times while RN attempting to draw labs and provide port care.

## 2017-07-22 NOTE — PROGRESS NOTES
Bedside report received. Overnight patient became very agitated pulling on lines, O2 tubing, pulled off wrist band, constantly attempting to get out of bed, requiring Haldol twice. Pt is unsteady on feet. Bed alarm in use. This morning Patient is A&O x 4. RA. Complains of 7/10 BL leg pain will medicate per MAR. Last HGB 6.1 new CBC pending. POC discussed with patient. Pt verbalizes understanding. Call light and belongings with in reach. Bed locked and in lowest position, alarm and fall precautions in place.

## 2017-07-22 NOTE — PROGRESS NOTES
Pt arrive to T814-2, pt is a&o denies pain and no distress at this time. Oriented to rm and flr and updated with plan of care. Bed alarm on, call light in reach and encourage to call for assistance.

## 2017-07-22 NOTE — PROGRESS NOTES
[facility] Hospitalist Progress Note    Date of Service: 2017    Chief Complaint  36 y.o. female admitted 2017 with ESRD previously on HD however stopped one month ago as she wishes to be on PD.  Presented for evaluation of F    Interval Problem Update  Uremia-feels very weak and nauseated. Denies any good appetite. No new skin changes.    ESRD-getting low flow iHD today.    RLE weakness-really no change. She refused MRI this AM. Mostly from the R knee down and claims she has never had anything like this before.     Consultants/Specialty  Nephrology    Disposition  -tele        Review of Systems   Constitutional: Positive for malaise/fatigue. Negative for fever.   Respiratory: Negative for cough and shortness of breath.    Cardiovascular: Positive for leg swelling. Negative for chest pain.   Gastrointestinal: Positive for nausea. Negative for vomiting, abdominal pain and diarrhea.        Poor appetite   Musculoskeletal: Positive for back pain.        R leg pain   Skin: Negative for rash.   Neurological: Positive for sensory change, focal weakness (RLE from the knee down) and weakness. Negative for dizziness, loss of consciousness and headaches.   Psychiatric/Behavioral: Positive for depression.   All other systems reviewed and are negative.     Physical Exam  Laboratory/Imaging   Hemodynamics  Temp (24hrs), Av.8 °C (98.3 °F), Min:36.6 °C (97.8 °F), Max:37.1 °C (98.7 °F)   Temperature: 36.9 °C (98.5 °F)  Pulse  Av.1  Min: 58  Max: 132 Heart Rate (Monitored): (!) 102  Blood Pressure: 159/97 mmHg, NIBP: 151/105 mmHg      Respiratory      Respiration: 18, Pulse Oximetry: 91 %     Work Of Breathing / Effort: Mild  RUL Breath Sounds: Clear, RML Breath Sounds: Clear, RLL Breath Sounds: Diminished, LASHANDA Breath Sounds: Clear, LLL Breath Sounds: Diminished    Fluids    Intake/Output Summary (Last 24 hours) at 17 1502  Last data filed at 17 2245   Gross per 24 hour   Intake   1340 ml   Output   2200  ml   Net   -860 ml       Nutrition  Orders Placed This Encounter   Procedures   • DIET ORDER     Standing Status: Standing      Number of Occurrences: 1      Standing Expiration Date:      Order Specific Question:  Diet:     Answer:  Renal [8]     Physical Exam   Constitutional: She is oriented to person, place, and time. She appears well-developed and well-nourished. No distress.   Appears tired and fatigued   HENT:   Head: Normocephalic and atraumatic.   Eyes: Right eye exhibits no discharge. Left eye exhibits no discharge.   Neck: No JVD present.   Cardiovascular: Normal rate and regular rhythm.    Murmur heard.  Pulmonary/Chest: Effort normal. No stridor. No respiratory distress. She has no wheezes. She has rales.   Abdominal: Soft. She exhibits no distension. There is no tenderness.   Multiple abdominal striae   Musculoskeletal: She exhibits edema.   3/5  PF and DF, 4/5 at the knee, slight improvement today of the RLE   Neurological: She is oriented to person, place, and time.   Skin: Skin is warm and dry. No rash noted. She is not diaphoretic.   Psychiatric: She has a normal mood and affect. Thought content normal.   Nursing note and vitals reviewed.      Recent Labs      07/20/17   0645  07/21/17   0711  07/22/17   0320   WBC  8.1  6.2  6.2   RBC  1.80*  1.99*  2.65*   HEMOGLOBIN  5.5*  6.1*  8.0*   HEMATOCRIT  17.5*  19.3*  24.7*   MCV  97.2  97.0  93.2   MCH  30.6  30.7  30.2   MCHC  31.4*  31.6*  32.4*   RDW  48.1  48.6  50.7*   PLATELETCT  142*  163*  215   MPV  11.1  10.8  10.9     Recent Labs      07/20/17   0420  07/21/17   0711  07/22/17   0320   SODIUM  140  143  143   POTASSIUM  3.4*  3.1*  3.5*   CHLORIDE  105  107  104   CO2  18*  21  21   GLUCOSE  83  85  69   BUN  55*  29*  17   CREATININE  6.96*  4.56*  3.57*   CALCIUM  7.8*  7.9*  8.3*                      Assessment/Plan     Sepsis (CMS-[facility]) (present on admission)  Assessment & Plan  - This is sepsis (without associated acute organ  dysfunction).   - SIRS given leukocytosis, fevers, tachycardia, tachypnea. Source presumed pulmonary  - Source evaluation to continue. CTAP is unremarkable, MRI L spine pending.  - Sepsis protocol  - Nosocomial exposure. Dialysis history. IV vancomycin / Zosyn. De escalate as able. Possible de-escalate tomorrow.    Uremic encephalopathy (present on admission)  Assessment & Plan  Secondary to non compliance with HD  -nephrology following, tolerating iHD  -mentation is slightly improved today    Pneumonia (present on admission)  Assessment & Plan  CT looks more like pulmonary edema  de-escalate antibiotics if cultures remain neg, no  Growth still at this point    Acute hypoxemic respiratory failure (CMS-[facility]) (present on admission)  Assessment & Plan  Improved s/p HD  Cont to follow  Treat possible HCAP    Narcotic dependence (CMS-HCC) (present on admission)  Assessment & Plan  - Decrease regimen.   - Monitor for withdrawal if any  - Risks of narcotics out weight any benefits at this time    Medical non-compliance (present on admission)  Assessment & Plan  - Counseled and educated    Anemia (present on admission)  Assessment & Plan  -likely multi-factorial, came back up today  -Ferritin high, Fe low, retic is somewhat appropriate for level of anemia, consider EPO injections  -watch closely for hemolysis    ESRD (end stage renal disease) (CMS-HCC) (present on admission)  Assessment & Plan  - Consult to Dr Gotti    Leg weakness (present on admission)  Assessment & Plan  - RLE, below knee  - MRI lumbar pending, but she refused  - DVT Duplex is negative  - May be uremic  - If fails to improve neurology consult, mild improvement today  - Neuro checks q 1 hourly    Low back pain (present on admission)  Assessment & Plan  - Poor historian. Flank vs lumbar. Tender in both area  - MRI lumbar spine is pending, she refused this AM, strongly encouraged her to allow us to to do this study.    HTN (hypertension) (present on  admission)  Assessment & Plan  - Monitor. IV labetalol as needed.     Lupus (CMS-HCC) (present on admission)  Assessment & Plan  - ESR is very high, non specific, possible lupus flare? Once infection is ruled out, could consider pulsed IV steroids  -get KARLA titer      Radiology images reviewed, EKG reviewed, Labs reviewed and Medications reviewed  Taylor catheter: No Taylor        DVT prophylaxis - mechanical: SCDs  Ulcer prophylaxis: Not indicated  Antibiotics: Treating active infection/contamination beyond 24 hours perioperative coverage

## 2017-07-22 NOTE — PROGRESS NOTES
Pt assisted to bathroom 1X assist and complains of nausea. Medicated with zofran (see MAR). Pt assisted back to bed. Unable to get pt to hold still long enough to replace new central line dressing with biopatch. RN was able to apply a new tegaderm over the port to secure posey needle in place and provide protection until pt is able to lay still enough for RN to apply sterile dressing.

## 2017-07-22 NOTE — PROGRESS NOTES
Pt keeps pulling off oxygen and restless. Pt stripped her bed of all linens and bed strip alarm - setting off alarm. Will medicate per MAR.

## 2017-07-22 NOTE — PROGRESS NOTES
Hospital Medicine Progress Note, Adult, Complex               Author: Spike Clevelandsasha Date & Time created: 7/22/2017  10:59 AM     Interval History:  36-year-old female with end-stage renal disease secondary to lupus nephritis, who did not come to the dialysis unit for over a month and came in with scott uremia. The patient had her first dialysis yesterday, low flow and tolerated it relatively well. Her mental status is improved. Apparently she was planning on switching to peritoneal dialysis. She was evaluated by the peritoneal dialysis unit, but was not deemed an appropriate candidate due to noncompliance.      Seems slightly more confused than yesterday. Plans for HD today.    Review of Systems:  Review of Systems   Constitutional: Negative for fever and chills.       Physical Exam:  Physical Exam   Constitutional: She appears well-developed and well-nourished.   Cardiovascular: Normal rate and regular rhythm.    Pulmonary/Chest: Effort normal and breath sounds normal.   Abdominal: Soft. Bowel sounds are normal.   Musculoskeletal: She exhibits no edema or tenderness.   Neurological: She is alert.   Skin: Skin is warm and dry.       Labs:        Invalid input(s): [id number]  Recent Labs      07/19/17 1248 07/19/17   1732   TROPONINI  0.23*  0.15*   BNPBTYPENAT  1827*   --      Recent Labs      07/19/17 1248  07/20/17 0420  07/21/17 0711 07/22/17   0320   SODIUM   --   140  143  143   POTASSIUM   --   3.4*  3.1*  3.5*   CHLORIDE   --   105  107  104   CO2   --   18*  21  21   BUN   --   55*  29*  17   CREATININE   --   6.96*  4.56*  3.57*   MAGNESIUM  2.5  2.1  2.0   --    PHOSPHORUS  10.5*  6.8*  3.9   --    CALCIUM   --   7.8*  7.9*  8.3*     Recent Labs      07/20/17   0420  07/21/17   0711  07/22/17   0320   ALTSGPT  12   --    --    ASTSGOT  12   --    --    ALKPHOSPHAT  36   --    --    TBILIRUBIN  0.6   --    --    GLUCOSE  83  85  69     Recent Labs      07/19/17 1248  07/20/17   0420   17   0645  17   0711  17   0320   RBC   --    --   1.80*  1.99*  2.65*   HEMOGLOBIN   --    --   5.5*  6.1*  8.0*   HEMATOCRIT   --    --   17.5*  19.3*  24.7*   PLATELETCT   --    --   142*  163*  215   PROTHROMBTM  17.8*   --    --    --    --    APTT  54.9*   --    --    --    --    INR  1.42*   --    --    --    --    IRON   --   10*   --    --    --    FERRITIN   --   376.4*   --    --    --    TOTIRONBC   --   154*   --    --    --      Recent Labs      17   0420  1745  17   0320   WBC   --   8.1  6.2  6.2   NEUTSPOLYS   --   78.10*  66.70  62.40   LYMPHOCYTES   --   10.50*  14.90*  20.00*   MONOCYTES   --   8.80  12.60  12.10   EOSINOPHILS   --   1.70  4.70  3.20   BASOPHILS   --   0.40  0.50  0.80   ASTSGOT  12   --    --    --    ALTSGPT  12   --    --    --    ALKPHOSPHAT  36   --    --    --    TBILIRUBIN  0.6   --    --    --            Hemodynamics:  Temp (24hrs), Av.8 °C (98.3 °F), Min:36.6 °C (97.8 °F), Max:37.1 °C (98.7 °F)  Temperature: 36.9 °C (98.5 °F)  Pulse  Av.2  Min: 58  Max: 132Heart Rate (Monitored): (!) 102  Blood Pressure: 153/93 mmHg, NIBP: 151/105 mmHg     Respiratory:    Respiration: 20, Pulse Oximetry: 92 %     Work Of Breathing / Effort: Mild  RUL Breath Sounds: Clear, RML Breath Sounds: Clear, RLL Breath Sounds: Diminished, LASHANDA Breath Sounds: Clear, LLL Breath Sounds: Diminished  Fluids:    Intake/Output Summary (Last 24 hours) at 17 1059  Last data filed at 17 2245   Gross per 24 hour   Intake   1340 ml   Output   2200 ml   Net   -860 ml     Weight: 76.1 kg (167 lb 12.3 oz)  GI/Nutrition:  Orders Placed This Encounter   Procedures   • DIET ORDER     Standing Status: Standing      Number of Occurrences: 1      Standing Expiration Date:      Order Specific Question:  Diet:     Answer:  Renal [8]     Medical Decision Making, by Problem:  Active Hospital Problems    Diagnosis   • Sepsis (CMS-HCC) [A41.9]   •  Uremic encephalopathy [G93.41, N19]   • Pneumonia [J18.9]   • Acute hypoxemic respiratory failure (CMS-HCC) [J96.01]   • Asterixis [R27.8]   • Anemia [D64.9]   • ESRD (end stage renal disease) (CMS-HCC) [N18.6]   • Leg weakness [R29.898]   • Low back pain [M54.5]   • HTN (hypertension) [I10]   • Lupus (CMS-HCC) [M32.9]   • Medical non-compliance [Z91.19]   • Narcotic dependence (CMS-HCC) [F11.20]     1. End-stage renal disease, noncompliance  2. Uremia due to noncompliance with dialysis  3. Uremic pericarditis  4. History of lupus nephritis  5. Anemia, Hgb 8, on epogen     -HD today  -Plan on holding HD tomorrow  -Will monitor      Core Measures

## 2017-07-22 NOTE — PROGRESS NOTES
Pt very agitated, restless, and uncooperative. Continues to remove cardiac monitor and nasal canula. Medicated with haldol and benadryl (see MAR).

## 2017-07-22 NOTE — PROGRESS NOTES
2 RN skin assessment performed by JOSETTE Diaz and JOSETTE Pearl. Scab on RLE, feet dry bilat. No other skin breakdown present on admission.

## 2017-07-23 LAB
ANION GAP SERPL CALC-SCNC: 16 MMOL/L (ref 0–11.9)
BASOPHILS # BLD AUTO: 1.5 % (ref 0–1.8)
BASOPHILS # BLD: 0.11 K/UL (ref 0–0.12)
BUN SERPL-MCNC: 13 MG/DL (ref 8–22)
C3 SERPL-MCNC: 102 MG/DL (ref 87–200)
C4 SERPL-MCNC: 28 MG/DL (ref 19–52)
CALCIUM SERPL-MCNC: 8.5 MG/DL (ref 8.5–10.5)
CHLORIDE SERPL-SCNC: 104 MMOL/L (ref 96–112)
CO2 SERPL-SCNC: 24 MMOL/L (ref 20–33)
CREAT SERPL-MCNC: 3.75 MG/DL (ref 0.5–1.4)
DSDNA AB TITR SER CLIF: DETECTED {TITER}
DSDNA IGG TITR SER CLIF: ABNORMAL {TITER}
EOSINOPHIL # BLD AUTO: 0.32 K/UL (ref 0–0.51)
EOSINOPHIL NFR BLD: 4.2 % (ref 0–6.9)
ERYTHROCYTE [DISTWIDTH] IN BLOOD BY AUTOMATED COUNT: 49.7 FL (ref 35.9–50)
GFR SERPL CREATININE-BSD FRML MDRD: 14 ML/MIN/1.73 M 2
GLUCOSE SERPL-MCNC: 87 MG/DL (ref 65–99)
HCT VFR BLD AUTO: 29.2 % (ref 37–47)
HGB BLD-MCNC: 9.6 G/DL (ref 12–16)
IMM GRANULOCYTES # BLD AUTO: 0.14 K/UL (ref 0–0.11)
IMM GRANULOCYTES NFR BLD AUTO: 1.9 % (ref 0–0.9)
LYMPHOCYTES # BLD AUTO: 1.37 K/UL (ref 1–4.8)
LYMPHOCYTES NFR BLD: 18.2 % (ref 22–41)
MAGNESIUM SERPL-MCNC: 2 MG/DL (ref 1.5–2.5)
MCH RBC QN AUTO: 31 PG (ref 27–33)
MCHC RBC AUTO-ENTMCNC: 32.9 G/DL (ref 33.6–35)
MCV RBC AUTO: 94.2 FL (ref 81.4–97.8)
MONOCYTES # BLD AUTO: 0.82 K/UL (ref 0–0.85)
MONOCYTES NFR BLD AUTO: 10.9 % (ref 0–13.4)
NEUTROPHILS # BLD AUTO: 4.78 K/UL (ref 2–7.15)
NEUTROPHILS NFR BLD: 63.3 % (ref 44–72)
NRBC # BLD AUTO: 0.03 K/UL
NRBC BLD AUTO-RTO: 0.4 /100 WBC
PLATELET # BLD AUTO: 263 K/UL (ref 164–446)
PMV BLD AUTO: 10.5 FL (ref 9–12.9)
POTASSIUM SERPL-SCNC: 3.4 MMOL/L (ref 3.6–5.5)
PROCALCITONIN SERPL-MCNC: 11.6 NG/ML
RBC # BLD AUTO: 3.1 M/UL (ref 4.2–5.4)
SODIUM SERPL-SCNC: 144 MMOL/L (ref 135–145)
WBC # BLD AUTO: 7.5 K/UL (ref 4.8–10.8)

## 2017-07-23 PROCEDURE — 770020 HCHG ROOM/CARE - TELE (206)

## 2017-07-23 PROCEDURE — 700102 HCHG RX REV CODE 250 W/ 637 OVERRIDE(OP): Performed by: INTERNAL MEDICINE

## 2017-07-23 PROCEDURE — 700111 HCHG RX REV CODE 636 W/ 250 OVERRIDE (IP): Performed by: INTERNAL MEDICINE

## 2017-07-23 PROCEDURE — 86162 COMPLEMENT TOTAL (CH50): CPT

## 2017-07-23 PROCEDURE — 700105 HCHG RX REV CODE 258: Performed by: HOSPITALIST

## 2017-07-23 PROCEDURE — 700111 HCHG RX REV CODE 636 W/ 250 OVERRIDE (IP): Performed by: HOSPITALIST

## 2017-07-23 PROCEDURE — 700105 HCHG RX REV CODE 258: Performed by: INTERNAL MEDICINE

## 2017-07-23 PROCEDURE — 36415 COLL VENOUS BLD VENIPUNCTURE: CPT

## 2017-07-23 PROCEDURE — 86038 ANTINUCLEAR ANTIBODIES: CPT

## 2017-07-23 PROCEDURE — A9270 NON-COVERED ITEM OR SERVICE: HCPCS | Performed by: INTERNAL MEDICINE

## 2017-07-23 PROCEDURE — 80048 BASIC METABOLIC PNL TOTAL CA: CPT

## 2017-07-23 PROCEDURE — 99232 SBSQ HOSP IP/OBS MODERATE 35: CPT | Performed by: INTERNAL MEDICINE

## 2017-07-23 PROCEDURE — 83735 ASSAY OF MAGNESIUM: CPT

## 2017-07-23 PROCEDURE — 84145 PROCALCITONIN (PCT): CPT

## 2017-07-23 PROCEDURE — 85025 COMPLETE CBC W/AUTO DIFF WBC: CPT

## 2017-07-23 PROCEDURE — 86160 COMPLEMENT ANTIGEN: CPT | Mod: 91

## 2017-07-23 RX ORDER — AMOXICILLIN AND CLAVULANATE POTASSIUM 500; 125 MG/1; MG/1
1 TABLET, FILM COATED ORAL EVERY 12 HOURS
Status: COMPLETED | OUTPATIENT
Start: 2017-07-23 | End: 2017-07-25

## 2017-07-23 RX ORDER — AMOXICILLIN AND CLAVULANATE POTASSIUM 875; 125 MG/1; MG/1
1 TABLET, FILM COATED ORAL EVERY 12 HOURS
Status: DISCONTINUED | OUTPATIENT
Start: 2017-07-23 | End: 2017-07-23

## 2017-07-23 RX ORDER — CARVEDILOL 6.25 MG/1
6.25 TABLET ORAL 2 TIMES DAILY WITH MEALS
Status: DISCONTINUED | OUTPATIENT
Start: 2017-07-23 | End: 2017-07-26

## 2017-07-23 RX ORDER — AMLODIPINE BESYLATE 10 MG/1
10 TABLET ORAL
Status: DISCONTINUED | OUTPATIENT
Start: 2017-07-23 | End: 2017-07-26

## 2017-07-23 RX ORDER — PREDNISONE 20 MG/1
20 TABLET ORAL DAILY
Status: COMPLETED | OUTPATIENT
Start: 2017-07-23 | End: 2017-07-27

## 2017-07-23 RX ORDER — LABETALOL HYDROCHLORIDE 5 MG/ML
20 INJECTION, SOLUTION INTRAVENOUS EVERY 4 HOURS PRN
Status: DISCONTINUED | OUTPATIENT
Start: 2017-07-23 | End: 2017-08-04 | Stop reason: HOSPADM

## 2017-07-23 RX ADMIN — LABETALOL HYDROCHLORIDE 10 MG: 5 INJECTION, SOLUTION INTRAVENOUS at 08:38

## 2017-07-23 RX ADMIN — PIPERACILLIN AND TAZOBACTAM 2.25 G: 2; .25 INJECTION, POWDER, LYOPHILIZED, FOR SOLUTION INTRAVENOUS; PARENTERAL at 10:00

## 2017-07-23 RX ADMIN — STANDARDIZED SENNA CONCENTRATE AND DOCUSATE SODIUM 2 TABLET: 8.6; 5 TABLET, FILM COATED ORAL at 08:34

## 2017-07-23 RX ADMIN — STANDARDIZED SENNA CONCENTRATE AND DOCUSATE SODIUM 2 TABLET: 8.6; 5 TABLET, FILM COATED ORAL at 21:55

## 2017-07-23 RX ADMIN — AMLODIPINE BESYLATE 10 MG: 10 TABLET ORAL at 16:10

## 2017-07-23 RX ADMIN — LABETALOL HYDROCHLORIDE 10 MG: 5 INJECTION, SOLUTION INTRAVENOUS at 03:14

## 2017-07-23 RX ADMIN — LABETALOL HYDROCHLORIDE 10 MG: 5 INJECTION, SOLUTION INTRAVENOUS at 16:11

## 2017-07-23 RX ADMIN — PREDNISONE 20 MG: 20 TABLET ORAL at 16:10

## 2017-07-23 RX ADMIN — LABETALOL HYDROCHLORIDE 10 MG: 5 INJECTION, SOLUTION INTRAVENOUS at 12:01

## 2017-07-23 RX ADMIN — ACETAMINOPHEN 650 MG: 325 TABLET, FILM COATED ORAL at 08:33

## 2017-07-23 RX ADMIN — LABETALOL HYDROCHLORIDE 10 MG: 5 INJECTION, SOLUTION INTRAVENOUS at 01:49

## 2017-07-23 RX ADMIN — CARVEDILOL 6.25 MG: 6.25 TABLET, FILM COATED ORAL at 18:30

## 2017-07-23 RX ADMIN — HYDROXYCHLOROQUINE SULFATE 200 MG: 200 TABLET, FILM COATED ORAL at 01:49

## 2017-07-23 RX ADMIN — FAMOTIDINE 20 MG: 20 TABLET, FILM COATED ORAL at 08:33

## 2017-07-23 RX ADMIN — LABETALOL HYDROCHLORIDE 10 MG: 5 INJECTION, SOLUTION INTRAVENOUS at 16:19

## 2017-07-23 RX ADMIN — AMOXICILLIN AND CLAVULANATE POTASSIUM 1 TABLET: 500; 125 TABLET, FILM COATED ORAL at 21:55

## 2017-07-23 RX ADMIN — HYDROXYCHLOROQUINE SULFATE 200 MG: 200 TABLET, FILM COATED ORAL at 21:55

## 2017-07-23 RX ADMIN — LABETALOL HYDROCHLORIDE 20 MG: 5 INJECTION INTRAVENOUS at 21:56

## 2017-07-23 RX ADMIN — PIPERACILLIN AND TAZOBACTAM 2.25 G: 2; .25 INJECTION, POWDER, LYOPHILIZED, FOR SOLUTION INTRAVENOUS; PARENTERAL at 01:49

## 2017-07-23 RX ADMIN — AMOXICILLIN AND CLAVULANATE POTASSIUM 1 TABLET: 500; 125 TABLET, FILM COATED ORAL at 12:09

## 2017-07-23 RX ADMIN — IRON SUCROSE 200 MG: 20 INJECTION, SOLUTION INTRAVENOUS at 08:38

## 2017-07-23 ASSESSMENT — ENCOUNTER SYMPTOMS
CHILLS: 0
NAUSEA: 1
FOCAL WEAKNESS: 1
COUGH: 0
VOMITING: 0
DIARRHEA: 0
BACK PAIN: 1
DEPRESSION: 1
DIZZINESS: 0
ABDOMINAL PAIN: 0
LOSS OF CONSCIOUSNESS: 0
FEVER: 0
HEADACHES: 0
SENSORY CHANGE: 1
SHORTNESS OF BREATH: 0
WEAKNESS: 1

## 2017-07-23 ASSESSMENT — PAIN SCALES - GENERAL
PAINLEVEL_OUTOF10: 8
PAINLEVEL_OUTOF10: 0
PAINLEVEL_OUTOF10: 4

## 2017-07-23 ASSESSMENT — COPD QUESTIONNAIRES
DURING THE PAST 4 WEEKS HOW MUCH DID YOU FEEL SHORT OF BREATH: NONE/LITTLE OF THE TIME
COPD SCREENING SCORE: 2
HAVE YOU SMOKED AT LEAST 100 CIGARETTES IN YOUR ENTIRE LIFE: YES
DO YOU EVER COUGH UP ANY MUCUS OR PHLEGM?: NO/ONLY WITH OCCASIONAL COLDS OR INFECTIONS

## 2017-07-23 ASSESSMENT — PAIN SCALES - WONG BAKER: WONGBAKER_NUMERICALRESPONSE: HURTS A LITTLE MORE

## 2017-07-23 ASSESSMENT — PATIENT HEALTH QUESTIONNAIRE - PHQ9
2. FEELING DOWN, DEPRESSED, IRRITABLE, OR HOPELESS: NOT AT ALL
1. LITTLE INTEREST OR PLEASURE IN DOING THINGS: NOT AT ALL
SUM OF ALL RESPONSES TO PHQ9 QUESTIONS 1 AND 2: 0
SUM OF ALL RESPONSES TO PHQ QUESTIONS 1-9: 0

## 2017-07-23 ASSESSMENT — LIFESTYLE VARIABLES: DO YOU DRINK ALCOHOL: NO

## 2017-07-23 NOTE — PROGRESS NOTES
Emla cream has been in place for 45 minutes now. Called oncology charge RN, and she said there are no nurses available at this time who can leave the unit due to currently infusing chemo meds. Called RICU charge RN, and she says she will send someone in about 30 minutes or so.

## 2017-07-23 NOTE — PROGRESS NOTES
Pt arrived on floor from dialysis. Monitor room notified. Bed in lowest position, locked, and alarm activated. Call light within reach. SR on monitor. Plan of care discussed with pt. Verbalizes understanding.

## 2017-07-23 NOTE — PROGRESS NOTES
HD treatment were ordered by Dr. Gotti, pt was not able to tolerate HD treatment and requested to come off HD treatment after pt line clotted and refused restart treatment. Pt was able to pull 2.5 liters and no blood loss were noted, Dr. Gotti was notified and is aware of patient request. Gave report to Emily FINCH, held pt AVF site for 10 min each, Emily FINCH verified that pt AVF site were clean dry and intact, no swelling or redness were noted post tx, +B/T pre and post tx, pt denies c/o headache and dizziness, asymptomatic. Pt was stable post tx, denies c/o pain and SOB, no fever or any other distress were noted. Treatment started at 1652 and ended at 1901, see flow sheets for further details.

## 2017-07-23 NOTE — PROGRESS NOTES
Attempted to administer IV labetalol for HTN and zoysyn, but Port not drawing back blood, nor flushing. Port deaccessed. Supplies to re-access port ordered. Will administer these meds once access is reestablished.

## 2017-07-23 NOTE — CARE PLAN
Problem: Safety  Goal: Will remain free from injury  Outcome: PROGRESSING AS EXPECTED  Renostephanie Manrique Fall Risk Assessment:     Last Known Fall: Within the last month  Mobility: Immobilized/requires assist of one person  Medications: Cardiovascular or central nervous system meds  Mental Status/LOC/Awareness: Oriented to person and place  Toileting Needs: No needs  Volume/Electrolyte Status: No problems  Communication/Sensory: No deficits  Behavior: Impulsiveness  Renostephanie Manrique Fall Risk Total: 12  Fall Risk Level: MODERATE RISK    Universal Fall Precautions:  call light/belongings in reach, bed in low position and locked, wheelchairs and assistive devices out of sight, siderails up x 2, use non-slip footwear, adequate lighting, clutter free and spill free environment, educate on level of risk, educate to call for assistance    Fall Risk Level Interventions:    TRIAL (TELE 8, NEURO, MED GABBIE 5) Moderate Fall Risk Interventions  Place yellow fall risk ID band on patient: refused  Provide patient/family education based on risk assessment : verified  Educate patient/family to call staff for assistance when getting out of bed: verified  Place fall precaution signage outside patient door: verified  Utilize bed/chair fall alarm: verified     Patient Specific Interventions:     Medication: review medications with patient and family, assess for medications that can be discontinued or dosage decreased, limit combination of prn medications and assess need for orthostatic hypotension evaluation  Mental Status/LOC/Awareness: reorient patient, reinforce falls education and check on patient hourly  Toileting: provide frquent toileting and instruct patient/family on the use of grab bars  Volume/Electrolyte Status: ensure patient remains hydrated, administer medications as ordered for nausea and vomiting, monitor abnormal lab values and ensure IV fluids are appropriate  Communication/Sensory: update plan of care on whiteboard, ensure  proper positioning when transferrng/ambulating and ensure patient has glasses/contacts and hearing aids/dentures  Behavioral: engage patient in daily activities, administer medication as ordered and instruct/reinforce fall program rationale  Mobility: schedule physical activity throughout the day, provide comfort measures during transport, dangle prior to standing, utilize bed/chair fall alarm, ensure bed is locked and in lowest position, provide appropriate assistive device and instruct patient to exit bed on their strongest side    Problem: Pain Management  Goal: Pain level will decrease to patient’s comfort goal  Outcome: PROGRESSING AS EXPECTED    07/19/17 0712 07/20/17 0600 07/22/17 1600   OTHER   Nurse Pain Scale 0 - 10  --  --  --    Non Verbal Scale  --  --  Sleeping;Calm;Unlabored Breathing   Christensen-Morales Scale  4  --  --    CPOT Total Score --  0 --    Comfort Goal --  --  --      07/22/17 2145   OTHER   Nurse Pain Scale 0 - 10  4   Non Verbal Scale  --    Christensen-Baker Scale  --    CPOT Total Score --    Comfort Goal Comfort at Rest   Discussed plan of care for pain management. Medication information per MAR, and non-pharmacological interventions: rest, repositioning, distraction, ice, heat. Pt verbalizes agreement and understanding.

## 2017-07-23 NOTE — PROGRESS NOTES
[facility] Hospitalist Progress Note    Date of Service: 2017    Chief Complaint  36 y.o. female admitted 2017 with ESRD previously on HD however stopped one month ago as she wishes to be on PD.  Presented for evaluation of F    Interval Problem Update  Uremia-feels a little stronger today. Still poor appetite.     ESRD-getting low flow iHD daily. Tolerating well..    RLE weakness-she feels it is improving and somewhat open to the idea of getting an MRI today    Lupus-titers up to 1:640, c/f lupus flare as well. Infection has been ruled out at this point.    Consultants/Specialty  Nephrology    Disposition  -tele        Review of Systems   Constitutional: Positive for malaise/fatigue (feels more fatigued today). Negative for fever.   Respiratory: Negative for cough and shortness of breath.    Cardiovascular: Positive for leg swelling (nearly resolved). Negative for chest pain.   Gastrointestinal: Positive for nausea. Negative for vomiting, abdominal pain and diarrhea.        Poor appetite, remains the same today   Musculoskeletal: Positive for back pain.        R leg pain   Skin: Negative for rash.   Neurological: Positive for sensory change, focal weakness (RLE from the knee down, better today) and weakness. Negative for dizziness, loss of consciousness and headaches.   Psychiatric/Behavioral: Positive for depression.   All other systems reviewed and are negative.     Physical Exam  Laboratory/Imaging   Hemodynamics  Temp (24hrs), Av.8 °C (98.2 °F), Min:36.5 °C (97.7 °F), Max:36.9 °C (98.5 °F)   Temperature: 36.5 °C (97.7 °F)  Pulse  Av.4  Min: 58  Max: 132   Blood Pressure: (!) 178/109 mmHg      Respiratory      Respiration: 20, Pulse Oximetry: 92 %     Work Of Breathing / Effort: Mild  RUL Breath Sounds: Clear, RML Breath Sounds: Clear, RLL Breath Sounds: Diminished, LASHANDA Breath Sounds: Clear, LLL Breath Sounds: Diminished    Fluids    Intake/Output Summary (Last 24 hours) at 17 6948  Last  data filed at 07/23/17 0800   Gross per 24 hour   Intake    830 ml   Output   2500 ml   Net  -1670 ml       Nutrition  Orders Placed This Encounter   Procedures   • DIET ORDER     Standing Status: Standing      Number of Occurrences: 1      Standing Expiration Date:      Order Specific Question:  Diet:     Answer:  Renal [8]     Physical Exam   Constitutional: She is oriented to person, place, and time. She appears well-developed and well-nourished. No distress.   Slightly more delayed in her answers today   HENT:   Head: Normocephalic and atraumatic.   Eyes: No scleral icterus.   Cardiovascular: Normal rate and regular rhythm.    Murmur heard.  Pulmonary/Chest: Effort normal. No stridor. No respiratory distress. She has no wheezes. She has rales.   Abdominal: Soft. She exhibits no distension. There is no tenderness.   Multiple abdominal striae   Musculoskeletal: She exhibits edema.   3.5/5  PF and DF, 4/5 at the knee, slight improvement today of the RLE again today   Neurological: She is oriented to person, place, and time.   Skin: Skin is warm and dry. No rash noted. She is not diaphoretic.   Psychiatric: She has a normal mood and affect. Thought content normal.   Nursing note and vitals reviewed.      Recent Labs      07/21/17   0711  07/22/17   0320  07/23/17   0409   WBC  6.2  6.2  7.5   RBC  1.99*  2.65*  3.10*   HEMOGLOBIN  6.1*  8.0*  9.6*   HEMATOCRIT  19.3*  24.7*  29.2*   MCV  97.0  93.2  94.2   MCH  30.7  30.2  31.0   MCHC  31.6*  32.4*  32.9*   RDW  48.6  50.7*  49.7   PLATELETCT  163*  215  263   MPV  10.8  10.9  10.5     Recent Labs      07/21/17   0711  07/22/17   0320  07/23/17   0300   SODIUM  143  143  144   POTASSIUM  3.1*  3.5*  3.4*   CHLORIDE  107  104  104   CO2  21  21  24   GLUCOSE  85  69  87   BUN  29*  17  13   CREATININE  4.56*  3.57*  3.75*   CALCIUM  7.9*  8.3*  8.5                      Assessment/Plan     Sepsis (CMS-[facility]) (present on admission)  Assessment & Plan  - This is sepsis  (without associated acute organ dysfunction).   - SIRS given leukocytosis, fevers, tachycardia, tachypnea. Source presumed pulmonary  - Source evaluation to continue. CTAP is unremarkable, MRI L spine pending.  - has resolved, unclear if truly sepsis at the beginning, could have been a lupus flare, all cultures are NGTD    Uremic encephalopathy (present on admission)  Assessment & Plan  Secondary to non compliance with HD  -nephrology following, tolerating iHD  -mentation is essentially unchanged today    Pneumonia (present on admission)  Assessment & Plan  CT looks more like pulmonary edema  -stop all abx's today (IV), start PO augmentin    Acute hypoxemic respiratory failure (CMS-HCC) (present on admission)  Assessment & Plan  Improved s/p HD  Cont to follow  -unlikely to be HCAP, more pulmonary edema from many missed iHD sessions    Narcotic dependence (CMS-[facility]) (present on admission)  Assessment & Plan  - continued reduced regimen  - Monitor for withdrawal if any  - Risks of narcotics out weight any benefits at this time    Medical non-compliance (present on admission)  Assessment & Plan  - Counseled and educated    Anemia (present on admission)  Assessment & Plan  -likely multi-factorial, continues to improve on its own  -Ferritin high, Fe low, retic is somewhat appropriate for level of anemia, consider EPO injections  -watch closely for hemolysis    ESRD (end stage renal disease) (CMS-[facility]) (present on admission)  Assessment & Plan  - Consult to Dr Gotti    Leg weakness (present on admission)  Assessment & Plan  - RLE, below knee, appears to be improving  - MRI lumbar pending, she is open to getting it done today  - DVT Duplex is negative  - May be uremic  - If fails to improve neurology consult, more improvement noted today  - Neuro checks q 1 hourly    Low back pain (present on admission)  Assessment & Plan  - Poor historian. Flank vs lumbar. Tender in both area  - MRI lumbar spine is pending, she refused  this AM, strongly encouraged her to allow us to to do this study.    HTN (hypertension) (present on admission)  Assessment & Plan  -remains very high  -continue IV labetolol PRN  -should continue to improve with iHD  -will start norvasc 10 mg daily    Lupus (CMS-HCC) (present on admission)  Assessment & Plan  - ESR is very high, non specific, possible lupus flare? Once infection is ruled out, could consider pulsed IV steroids  -KARLA titer 1:640, which is elevated, c/f flare causing some of her symptoms  -start prednisone 20 mg daily, watch for blossoming infection, will do short 5 day course      Radiology images reviewed, EKG reviewed, Labs reviewed and Medications reviewed  Taylor catheter: No Taylor        DVT prophylaxis - mechanical: SCDs  Ulcer prophylaxis: Not indicated  Antibiotics: Treating active infection/contamination beyond 24 hours perioperative coverage

## 2017-07-23 NOTE — PROGRESS NOTES
Hospital Medicine Progress Note, Adult, Complex               Author: Spike Clevelandluisroya Date & Time created: 7/23/2017  10:08 AM     Interval History:  36-year-old female with end-stage renal disease secondary to lupus nephritis, who did not come to the dialysis unit for over a month and came in with scott uremia. The patient had her first dialysis yesterday, low flow and tolerated it relatively well. Her mental status is improved. Apparently she was planning on switching to peritoneal dialysis. She was evaluated by the peritoneal dialysis unit, but was not deemed an appropriate candidate due to noncompliance.      Overall appears that she is improving. Responding appropriately to HD. No plans for HD today.    Review of Systems:  Review of Systems   Constitutional: Negative for fever and chills.       Physical Exam:  Physical Exam   Constitutional: She appears well-developed and well-nourished.   Cardiovascular: Normal rate and regular rhythm.    Pulmonary/Chest: Effort normal and breath sounds normal.   Abdominal: Soft. Bowel sounds are normal.   Musculoskeletal: She exhibits no edema or tenderness.   Neurological: She is alert.   Skin: Skin is warm and dry.       Labs:        Invalid input(s): [id number]      Recent Labs      07/21/17   0711  07/22/17   0320  07/23/17   0300   SODIUM  143  143  144   POTASSIUM  3.1*  3.5*  3.4*   CHLORIDE  107  104  104   CO2  21  21  24   BUN  29*  17  13   CREATININE  4.56*  3.57*  3.75*   MAGNESIUM  2.0   --   2.0   PHOSPHORUS  3.9   --    --    CALCIUM  7.9*  8.3*  8.5     Recent Labs      07/21/17   0711  07/22/17   0320  07/23/17   0300   GLUCOSE  85  69  87     Recent Labs      07/21/17   0711  07/22/17   0320  07/23/17   0409   RBC  1.99*  2.65*  3.10*   HEMOGLOBIN  6.1*  8.0*  9.6*   HEMATOCRIT  19.3*  24.7*  29.2*   PLATELETCT  163*  215  263     Recent Labs      07/21/17   0711  07/22/17   0320  07/23/17   0409   WBC  6.2  6.2  7.5   NEUTSPOLYS  66.70  62.40  63.30    LYMPHOCYTES  14.90*  20.00*  18.20*   MONOCYTES  12.60  12.10  10.90   EOSINOPHILS  4.70  3.20  4.20   BASOPHILS  0.50  0.80  1.50           Hemodynamics:  Temp (24hrs), Av.8 °C (98.3 °F), Min:36.6 °C (97.9 °F), Max:36.9 °C (98.5 °F)  Temperature: 36.9 °C (98.4 °F)  Pulse  Av.5  Min: 58  Max: 132  Blood Pressure: (!) 175/103 mmHg (Nurse notified)     Respiratory:    Respiration: 20, Pulse Oximetry: 93 %     Work Of Breathing / Effort: Mild  RUL Breath Sounds: Clear, RML Breath Sounds: Clear, RLL Breath Sounds: Diminished, LASHANDA Breath Sounds: Clear, LLL Breath Sounds: Diminished  Fluids:    Intake/Output Summary (Last 24 hours) at 17 1008  Last data filed at 17 0800   Gross per 24 hour   Intake    830 ml   Output   2500 ml   Net  -1670 ml     Weight: 76.4 kg (168 lb 6.9 oz)  GI/Nutrition:  Orders Placed This Encounter   Procedures   • DIET ORDER     Standing Status: Standing      Number of Occurrences: 1      Standing Expiration Date:      Order Specific Question:  Diet:     Answer:  Renal [8]     Medical Decision Making, by Problem:  Active Hospital Problems    Diagnosis   • Sepsis (CMS-HCC) [A41.9]   • Uremic encephalopathy [G93.41, N19]   • Pneumonia [J18.9]   • Acute hypoxemic respiratory failure (CMS-HCC) [J96.01]   • Asterixis [R27.8]   • Anemia [D64.9]   • ESRD (end stage renal disease) (CMS-HCC) [N18.6]   • Leg weakness [R29.898]   • Low back pain [M54.5]   • HTN (hypertension) [I10]   • Lupus (CMS-HCC) [M32.9]   • Medical non-compliance [Z91.19]   • Narcotic dependence (CMS-HCC) [F11.20]     1. End-stage renal disease, noncompliance  2. Uremia due to noncompliance with dialysis  3. Uremic pericarditis  4. History of lupus nephritis, KARLA titer 1:640; on plaquenil  5. Anemia, Hgb 9.6, on epogen     -HD next Monday  - ?Lupus flare; no complements; consider prednisone; continue plaquenil      Core Measures

## 2017-07-23 NOTE — PROGRESS NOTES
"Pharmacy Kinetics 36 y.o. female on vancomycin day # 4 2017    Currently on Vancomycin pulse dosing              2000 mg IV loading dose  @ 0856   1200 mg IV x1 dose  @ 1400    Indication for Treatment: unknown source of infection    Pertinent history per medical record: Admitted on 2017 for complaints of fever at home (x 48 hours prior to arrival with Tmax reportedly 103) and RLE pain. Patient is non-weight bearing at home.Patient states that she is ESRD on HD, but has been without HD treatment for more than a month due to issues in trying to switch over to peritoneal dialysis.  Pt with right AV fistula in place. History of recurrent UTIs. In the ED, she was found to be hypertensive and tachycardiac with a fever of 101.7. Empiric abx have been intiatied.     Other antibiotics: Zosyn 2.25 g IV q8h    Allergies: Morphine and Seasonal     List concerns for renal function: ESRD on IHD    Pertinent cultures to date:     PBC x2: NGTD   urine: NGTD    Recent Labs      17   0645  17   0711  17   0320   WBC  8.1  6.2  6.2   NEUTSPOLYS  78.10*  66.70  62.40     Recent Labs      17   0420  17   0711  17   0320   BUN  55*  29*  17   CREATININE  6.96*  4.56*  3.57*   ALBUMIN  2.8*   --    --      Recent Labs      17   1350   VANCORANDOM  13.3     Intake/Output Summary (Last 24 hours) at 17 1723  Last data filed at 17 2245   Gross per 24 hour   Intake    240 ml   Output      0 ml   Net    240 ml      Blood pressure 159/97, pulse 97, temperature 36.9 °C (98.5 °F), resp. rate 18, height 1.651 m (5' 5\"), weight 76.1 kg (167 lb 12.3 oz), SpO2 91 %, not currently breastfeeding. Temp (24hrs), Av.8 °C (98.3 °F), Min:36.6 °C (97.8 °F), Max:37.1 °C (98.7 °F)      A/P   1. Vancomycin dose change: none  2. Next vancomycin level: ~1-2 days  3. Goal trough: 16-20 mcg/mL  4. Comments: No dose today and recommend trough level in a day or two to assess " accumulation. Pt refused MRI. Source of infection still unknown. Per MD note - will look to de-escalate tomorrow.     Marycruz Daniels, StevenD.

## 2017-07-23 NOTE — PROGRESS NOTES
Unable to access port with new posey needle. Pt complaining of pain and repeatedly telling me no and to stop. Notified Charge RN, and he was unable to feel the port well enough to feel comortable even attempting to access it. MD notified and orders received for emla cream. Charge RN contacted oncology charge nurse to try and find someone else to access port. Will wait for cream and supplies to arrive and then call oncology again.

## 2017-07-23 NOTE — CARE PLAN
Problem: Safety  Goal: Will remain free from injury  Outcome: PROGRESSING AS EXPECTED  Safety maintained: Bed locked/lowest position/alarms on, SR up x3, oriented to room, call light/belongings in reach, non-slip footwear applied, frequent visual checks, educated on safety protocols, no injuries on shift; will continue to monitor

## 2017-07-24 LAB
ANION GAP SERPL CALC-SCNC: 17 MMOL/L (ref 0–11.9)
BACTERIA BLD CULT: NORMAL
BACTERIA BLD CULT: NORMAL
BUN SERPL-MCNC: 24 MG/DL (ref 8–22)
CALCIUM SERPL-MCNC: 9.1 MG/DL (ref 8.5–10.5)
CHLORIDE SERPL-SCNC: 105 MMOL/L (ref 96–112)
CO2 SERPL-SCNC: 19 MMOL/L (ref 20–33)
CREAT SERPL-MCNC: 5.81 MG/DL (ref 0.5–1.4)
ENA SM IGG SER-ACNC: 115 AU/ML (ref 0–40)
ENA SS-B IGG SER IA-ACNC: 2 AU/ML (ref 0–40)
GFR SERPL CREATININE-BSD FRML MDRD: 8 ML/MIN/1.73 M 2
GLUCOSE SERPL-MCNC: 109 MG/DL (ref 65–99)
MAGNESIUM SERPL-MCNC: 2.3 MG/DL (ref 1.5–2.5)
NUCLEAR IGG SER QL IA: DETECTED
NUCLEAR IGG TITR SER IF: ABNORMAL {TITER}
POTASSIUM SERPL-SCNC: 3.6 MMOL/L (ref 3.6–5.5)
SIGNIFICANT IND 70042: NORMAL
SIGNIFICANT IND 70042: NORMAL
SITE SITE: NORMAL
SITE SITE: NORMAL
SODIUM SERPL-SCNC: 141 MMOL/L (ref 135–145)
SOURCE SOURCE: NORMAL
SOURCE SOURCE: NORMAL
SSA52 R0ENA AB IGG Q0420: 9 AU/ML (ref 0–40)
SSA60 R0ENA AB IGG Q0419: 44 AU/ML (ref 0–40)
U1 SNRNP IGG SER QL: 36 AU/ML (ref 0–40)

## 2017-07-24 PROCEDURE — G8979 MOBILITY GOAL STATUS: HCPCS | Mod: CI

## 2017-07-24 PROCEDURE — A9270 NON-COVERED ITEM OR SERVICE: HCPCS | Performed by: INTERNAL MEDICINE

## 2017-07-24 PROCEDURE — 700102 HCHG RX REV CODE 250 W/ 637 OVERRIDE(OP): Performed by: HOSPITALIST

## 2017-07-24 PROCEDURE — 90935 HEMODIALYSIS ONE EVALUATION: CPT

## 2017-07-24 PROCEDURE — G8978 MOBILITY CURRENT STATUS: HCPCS | Mod: CJ

## 2017-07-24 PROCEDURE — 700111 HCHG RX REV CODE 636 W/ 250 OVERRIDE (IP): Performed by: HOSPITALIST

## 2017-07-24 PROCEDURE — 700111 HCHG RX REV CODE 636 W/ 250 OVERRIDE (IP)

## 2017-07-24 PROCEDURE — 97161 PT EVAL LOW COMPLEX 20 MIN: CPT

## 2017-07-24 PROCEDURE — 700102 HCHG RX REV CODE 250 W/ 637 OVERRIDE(OP): Performed by: INTERNAL MEDICINE

## 2017-07-24 PROCEDURE — 700111 HCHG RX REV CODE 636 W/ 250 OVERRIDE (IP): Performed by: INTERNAL MEDICINE

## 2017-07-24 PROCEDURE — 700105 HCHG RX REV CODE 258: Performed by: HOSPITALIST

## 2017-07-24 PROCEDURE — 80048 BASIC METABOLIC PNL TOTAL CA: CPT

## 2017-07-24 PROCEDURE — 770020 HCHG ROOM/CARE - TELE (206)

## 2017-07-24 PROCEDURE — A9270 NON-COVERED ITEM OR SERVICE: HCPCS | Performed by: HOSPITALIST

## 2017-07-24 PROCEDURE — 99232 SBSQ HOSP IP/OBS MODERATE 35: CPT | Performed by: INTERNAL MEDICINE

## 2017-07-24 PROCEDURE — 83735 ASSAY OF MAGNESIUM: CPT

## 2017-07-24 RX ORDER — HEPARIN SODIUM 1000 [USP'U]/ML
INJECTION, SOLUTION INTRAVENOUS; SUBCUTANEOUS
Status: COMPLETED
Start: 2017-07-24 | End: 2017-07-24

## 2017-07-24 RX ORDER — HYDRALAZINE HYDROCHLORIDE 20 MG/ML
20 INJECTION INTRAMUSCULAR; INTRAVENOUS EVERY 6 HOURS PRN
Status: DISCONTINUED | OUTPATIENT
Start: 2017-07-24 | End: 2017-08-04 | Stop reason: HOSPADM

## 2017-07-24 RX ADMIN — CARVEDILOL 6.25 MG: 6.25 TABLET, FILM COATED ORAL at 17:41

## 2017-07-24 RX ADMIN — HEPARIN SODIUM 2000 UNITS: 1000 INJECTION, SOLUTION INTRAVENOUS; SUBCUTANEOUS at 10:13

## 2017-07-24 RX ADMIN — EPOETIN ALFA 2000 UNITS: 2000 SOLUTION INTRAVENOUS; SUBCUTANEOUS at 13:11

## 2017-07-24 RX ADMIN — HALOPERIDOL LACTATE 5 MG: 5 INJECTION, SOLUTION INTRAMUSCULAR at 03:47

## 2017-07-24 RX ADMIN — HEPARIN 500 UNITS: 100 SYRINGE at 02:13

## 2017-07-24 RX ADMIN — OXYCODONE HYDROCHLORIDE 20 MG: 10 TABLET ORAL at 06:16

## 2017-07-24 RX ADMIN — PREDNISONE 20 MG: 20 TABLET ORAL at 07:50

## 2017-07-24 RX ADMIN — AMOXICILLIN AND CLAVULANATE POTASSIUM 1 TABLET: 500; 125 TABLET, FILM COATED ORAL at 20:34

## 2017-07-24 RX ADMIN — HEPARIN 500 UNITS: 100 SYRINGE at 20:40

## 2017-07-24 RX ADMIN — HYDRALAZINE HYDROCHLORIDE 20 MG: 20 INJECTION INTRAMUSCULAR; INTRAVENOUS at 00:46

## 2017-07-24 RX ADMIN — STANDARDIZED SENNA CONCENTRATE AND DOCUSATE SODIUM 2 TABLET: 8.6; 5 TABLET, FILM COATED ORAL at 07:50

## 2017-07-24 RX ADMIN — HYDROXYCHLOROQUINE SULFATE 200 MG: 200 TABLET, FILM COATED ORAL at 20:34

## 2017-07-24 RX ADMIN — HYDRALAZINE HYDROCHLORIDE 20 MG: 20 INJECTION INTRAMUSCULAR; INTRAVENOUS at 02:09

## 2017-07-24 RX ADMIN — FAMOTIDINE 20 MG: 20 TABLET, FILM COATED ORAL at 07:50

## 2017-07-24 RX ADMIN — HYDRALAZINE HYDROCHLORIDE 20 MG: 20 INJECTION INTRAMUSCULAR; INTRAVENOUS at 20:36

## 2017-07-24 RX ADMIN — LABETALOL HYDROCHLORIDE 20 MG: 5 INJECTION INTRAVENOUS at 04:29

## 2017-07-24 RX ADMIN — EPOETIN ALFA 3000 UNITS: 3000 SOLUTION INTRAVENOUS; SUBCUTANEOUS at 13:10

## 2017-07-24 RX ADMIN — AMLODIPINE BESYLATE 10 MG: 10 TABLET ORAL at 07:50

## 2017-07-24 RX ADMIN — LIDOCAINE AND PRILOCAINE 1 APPLICATION: 25; 25 CREAM TOPICAL at 09:22

## 2017-07-24 RX ADMIN — CARVEDILOL 6.25 MG: 6.25 TABLET, FILM COATED ORAL at 07:50

## 2017-07-24 RX ADMIN — STANDARDIZED SENNA CONCENTRATE AND DOCUSATE SODIUM 2 TABLET: 8.6; 5 TABLET, FILM COATED ORAL at 20:34

## 2017-07-24 RX ADMIN — AMOXICILLIN AND CLAVULANATE POTASSIUM 1 TABLET: 500; 125 TABLET, FILM COATED ORAL at 07:50

## 2017-07-24 RX ADMIN — HEPARIN 500 UNITS: 100 SYRINGE at 04:30

## 2017-07-24 RX ADMIN — IRON SUCROSE 200 MG: 20 INJECTION, SOLUTION INTRAVENOUS at 09:23

## 2017-07-24 ASSESSMENT — COGNITIVE AND FUNCTIONAL STATUS - GENERAL
SUGGESTED CMS G CODE MODIFIER MOBILITY: CJ
MOBILITY SCORE: 21
CLIMB 3 TO 5 STEPS WITH RAILING: A LOT
WALKING IN HOSPITAL ROOM: A LITTLE

## 2017-07-24 ASSESSMENT — ENCOUNTER SYMPTOMS
DIARRHEA: 0
CHILLS: 0
FOCAL WEAKNESS: 0
WEAKNESS: 1
DEPRESSION: 1
BACK PAIN: 1
SENSORY CHANGE: 0
LOSS OF CONSCIOUSNESS: 0
ABDOMINAL PAIN: 0
DIZZINESS: 0
VOMITING: 0
SHORTNESS OF BREATH: 0
FEVER: 0
NAUSEA: 0
COUGH: 0

## 2017-07-24 ASSESSMENT — GAIT ASSESSMENTS
DISTANCE (FEET): 10
DEVIATION: ANTALGIC
GAIT LEVEL OF ASSIST: CONTACT GUARD ASSIST
ASSISTIVE DEVICE: HAND HELD ASSIST

## 2017-07-24 ASSESSMENT — PAIN SCALES - GENERAL
PAINLEVEL_OUTOF10: 0
PAINLEVEL_OUTOF10: 1
PAINLEVEL_OUTOF10: 7
PAINLEVEL_OUTOF10: 0
PAINLEVEL_OUTOF10: 0

## 2017-07-24 NOTE — PROGRESS NOTES
[facility] Progress Note    Hemodialysis treatment ordered today per Dr. Shaver x 3 hours. Treatment initiated at 1018, ended at 1318.     Patient hypertensive at times during tx; see paper flow sheet for details.     Net UF 3,000 mL.     Needles removed from access site. Dressings applied and sites held x 15 minutes; verified no bleeding. Positive bruit/thrill post tx. Staff RN to monitor AVG for breakthrough bleeding. Should breakthrough bleeding occur, staff RN to apply pressure to access sites until bleeding resolved. Notify Dialysis and Nephrologist for follow-up.    Report given to Primary RN.      Delay x 30 minutes awaiting transport.

## 2017-07-24 NOTE — PROGRESS NOTES
Report received at 7:00 AM from Raya, pt is in bed, very drowsy but easily woken and reports no pain or discomfort. Pt is confused to place and event. Pt educated to use the call light before getting out of bed , and bed alarms in use. Bed is locked in lowest position with call light, belongings within reach, white board updated, and POC discussed. All needs met at this time.

## 2017-07-24 NOTE — PROGRESS NOTES
Received report from day shift RN. Twelve hour chart check completed. Patient assessed. Patient A and 0 X 2; oriented to self and time. Patient re-oriented appropriately.  Bed alarm is on.   Patient states that pain is a 0 out of 10.  Patient educated on plan of care for the evening including medications per MAR, monitoring vital signs, safety, pain control, and rest. Patient educated to call for assistance.

## 2017-07-24 NOTE — THERAPY
"37 y/o female adm for uremic encephalopathy. PLOF unobtained from pt due to decreased verbal responses. Noted antalgia with amb with no ad. Acute PT to address safe amb with or with no ad and balance.  Physical Therapy Evaluation completed.   Bed Mobility:  Supine to Sit: Modified Independent (pt sound oob walking to restroom)  Transfers:  SBA  Gait: Level Of Assist: Contact Guard Assist with Front-Wheel Walker       Plan of Care: Will benefit from Physical Therapy 2 times per week  Discharge Recommendations: Equipment: Will Continue to Assess for Equipment Needs. Post-acute therapy Discharge to home with outpatient or home health for additional skilled therapy services.    See \"Rehab Therapy-Acute\" Patient Summary Report for complete documentation.     "

## 2017-07-24 NOTE — PROGRESS NOTES
Report received from dialysis, pt VSS, /92, removed 3L of fluid. Pt lethargic, did not eat breakfast. Waiting for transport to bring back to floor.

## 2017-07-24 NOTE — CARE PLAN
Problem: Safety  Goal: Will remain free from falls  Intervention: Assess risk factors for falls  Bed in low position.  Treaded socks on patient.  Call light within reach. Bedrails closest to bathroom down. Bed alarms on and verified correct.           Problem: Skin Integrity  Goal: Risk for impaired skin integrity will decrease  Intervention: Assess risk factors for impaired skin integrity and/or pressure ulcers  Patient skin assessment completed. Patient moving in bed appropriately.

## 2017-07-24 NOTE — CARE PLAN
Problem: Communication  Goal: The ability to communicate needs accurately and effectively will improve  Outcome: PROGRESSING SLOWER THAN EXPECTED  Pt educated on the use of the call light and television controls, and oriented to the room, restroom, and unit. Pt educated how to call staff for any issues, problems or concerns. Pt verbalized understanding of all controls, orientation, how to call for needs. Pt has mild disorientation and requires further education and reorientation     Problem: Bowel/Gastric:  Goal: Will not experience complications related to bowel motility  Outcome: PROGRESSING AS EXPECTED  Pt educated on the use of stool softeners to aide in bowel maintenance and the importance of regular bowel movements

## 2017-07-24 NOTE — PROGRESS NOTES
Dialysis called, pt hypertensive with /103. MD aware of high blood pressure and updated BP medications for pt. MD concerned about pt becoming hypotensive from dialysis and concomitant BP medications. MD order for labetalol IV per MAR, dialysis unable to administer medication, RN to administer labetalol when pt returns to floor.

## 2017-07-24 NOTE — PROGRESS NOTES
Shadia Manrique Fall Risk Assessment:     Last Known Fall: Within the last month  Mobility: Immobilized/requires assist of one person  Medications: Cardiovascular and central nervous system meds  Mental Status/LOC/Awareness: Lethargic/oriented to person only  Toileting Needs: No needs  Volume/Electrolyte Status: No problems  Communication/Sensory: Visual (Glasses)/hearing deficit  Behavior: Appropriate behavior  Renostephanie Manrique Fall Risk Total: 11  Fall Risk Level: MODERATE RISK    Universal Fall Precautions:  call light/belongings in reach, bed in low position and locked, wheelchairs and assistive devices out of sight, siderails up x 2, use non-slip footwear, adequate lighting, clutter free and spill free environment, educate on level of risk, educate to call for assistance    Fall Risk Level Interventions:    TRIAL (TELE 8, NEURO, MED GABBIE 5) Moderate Fall Risk Interventions  Place yellow fall risk ID band on patient: refused  Provide patient/family education based on risk assessment : verified  Educate patient/family to call staff for assistance when getting out of bed: verified  Place fall precaution signage outside patient door: verified  Utilize bed/chair fall alarm: verified     Patient Specific Interventions:     Medication: assess for medications that can be discontinued or dosage decreased and limit combination of prn medications  Mental Status/LOC/Awareness: reorient patient, reinforce falls education, check on patient hourly, utilize bed/chair fall alarm, reinforce the use of call light and provide activity  Toileting: provide frquent toileting, instruct patient/family on the use of grab bars, instruct patient/family on the need to call for assistance when toileting and do not leave patient unattended in bathroom/refer to toileting scripting  Volume/Electrolyte Status: ensure patient remains hydrated, administer medications as ordered for nausea and vomiting and monitor abnormal lab  values  Communication/Sensory: update plan of care on whiteboard  Behavioral: administer medication as ordered and instruct/reinforce fall program rationale  Mobility: schedule physical activity throughout the day, provide comfort measures during transport, utilize bed/chair fall alarm, ensure bed is locked and in lowest position, provide appropriate assistive device and instruct patient to exit bed on their strongest side

## 2017-07-24 NOTE — PROGRESS NOTES
Renostephanie Manrique Fall Risk Assessment:     Last Known Fall: Within the last month  Mobility: Immobilized/requires assist of one person  Medications: Cardiovascular and central nervous system meds  Mental Status/LOC/Awareness: Oriented to person and place  Toileting Needs: No needs  Volume/Electrolyte Status: No problems  Communication/Sensory: No deficits  Behavior: Impulsiveness  Shadia Burton Fall Risk Total: 13  Fall Risk Level: MODERATE RISK    Universal Fall Precautions:  call light/belongings in reach, bed in low position and locked, siderails up x 2, use non-slip footwear, adequate lighting, clutter free and spill free environment, educate on level of risk, educate to call for assistance, wheelchairs and assistive devices out of sight    Fall Risk Level Interventions:    TRIAL (TELE 8, NEURO, MED GABBIE 5) Moderate Fall Risk Interventions  Place yellow fall risk ID band on patient: refused  Provide patient/family education based on risk assessment : completed  Educate patient/family to call staff for assistance when getting out of bed: completed  Place fall precaution signage outside patient door: completed  Utilize bed/chair fall alarm: completed     Patient Specific Interventions:     Medication: limit combination of prn medications  Mental Status/LOC/Awareness: reorient patient, reinforce falls education and utilize bed/chair fall alarm  Toileting: monitor intake and output/use of appropriate interventions  Volume/Electrolyte Status: monitor abnormal lab values  Communication/Sensory: update plan of care on whiteboard  Behavioral: administer medication as ordered  Mobility: utilize bed/chair fall alarm, ensure bed is locked and in lowest position, provide appropriate assistive device and collaborate with doctor for possible PT/OT consult

## 2017-07-24 NOTE — PROGRESS NOTES
[facility] Progress Note    Primary RN updated on /103  @ 1100. 2 hours left on HD treatment. To monitor.

## 2017-07-24 NOTE — DOCUMENTATION QUERY
DOCUMENTATION QUERY    PROVIDERS: Please select “Cosign w/ note”to reply to query.    To better represent the severity of illness of your patient, please review the following information and exercise your independent professional judgment in responding to this query.     Pulmonary edema is documented in the Progress Notes dated 7/23/17. Based upon the clinical findings, risk factors, and treatment, can this diagnosis be further specified?    • Acute pulmonary edema -  please specify if cardiac related:  please specify type and acuity of heart failure   • Acute pulmonary edema - non-cardiac related   • Chronic pulmonary edema -  please specify if cardiac related: please specify type and acuity of heart failure   • Chronic pulmonary edema - non-cardiac related   • Other explanation of clinical findings - please specify  • Unable to determine       The medical record reflects the following:   Clinical Findings 7/19/17: CT chest, abdomen, pelvis shows pulmonary edema   7/19/17: CXR shows multifocal pneumonia  7/19/17: ECHO   *EF 60%   * mild LV hypertrophy   *normal diastolic function  Acute respiratory failure with hypoxia    Treatment Labs  ECHO  Chest xray  CT chest/abdomen/pelvis  Hemodialysis  IV antibiotics   Risk Factors ESRD - noncompliant with hemodialysis  Lupus    Location within medical record Progress Notes and Radiology Results     Thank you,   Jackeline Pearson RN  Clinical   [phone]

## 2017-07-24 NOTE — PROGRESS NOTES
Monitor summary    .14/.08/.38  NSR throughout shift with episodes of tachycardia, usually 110s; one episode of 140s that lasted under two minutes.

## 2017-07-24 NOTE — PROGRESS NOTES
[facility]ist Progress Note    Date of Service: 2017    Chief Complaint  36 y.o. female admitted 2017 with ESRD previously on HD however stopped one month ago as she wishes to be on PD.  Presented for evaluation of F    Interval Problem Update  Uremia-feels about the same today, appetite is slowly improving.     ESRD-getting low flow iHD daily. Going down again for iHD.    RLE weakness-she feels it is back to baseline and is agreeable to get an MRI L spine today.    Lupus-titers up to 1:640, on prednisone now, denies any new symptoms.     Consultants/Specialty  Nephrology    Disposition  -tele        Review of Systems   Constitutional: Positive for malaise/fatigue (a little improvement today). Negative for fever and chills.   HENT: Negative for hearing loss.    Respiratory: Negative for cough and shortness of breath.    Cardiovascular: Positive for leg swelling (nearly resolved). Negative for chest pain.   Gastrointestinal: Negative for nausea, vomiting, abdominal pain and diarrhea.        Appetite has improved today   Musculoskeletal: Positive for back pain.   Skin: Negative for itching.   Neurological: Positive for weakness (slowly improving). Negative for dizziness, sensory change, focal weakness (RLE from the knee down, better today) and loss of consciousness.        She feels that her RLE has returned to normal now   Psychiatric/Behavioral: Positive for depression.   All other systems reviewed and are negative.     Physical Exam  Laboratory/Imaging   Hemodynamics  Temp (24hrs), Av.9 °C (98.4 °F), Min:36.7 °C (98.1 °F), Max:37.1 °C (98.8 °F)   Temperature: 37.1 °C (98.8 °F)  Pulse  Av.5  Min: 58  Max: 132   Blood Pressure: 142/92 mmHg      Respiratory      Respiration: 18, Pulse Oximetry: 94 %     Work Of Breathing / Effort: Mild  RUL Breath Sounds: Clear, RML Breath Sounds: Diminished, RLL Breath Sounds: Diminished, LASHANDA Breath Sounds: Clear, LLL Breath Sounds:  Diminished    Fluids    Intake/Output Summary (Last 24 hours) at 07/24/17 1418  Last data filed at 07/24/17 1318   Gross per 24 hour   Intake    660 ml   Output   3600 ml   Net  -2940 ml       Nutrition  Orders Placed This Encounter   Procedures   • DIET ORDER     Standing Status: Standing      Number of Occurrences: 1      Standing Expiration Date:      Order Specific Question:  Diet:     Answer:  Renal [8]     Physical Exam   Constitutional: She is oriented to person, place, and time. She appears well-developed and well-nourished. No distress.   Answers questions much more readily today   HENT:   Head: Normocephalic and atraumatic.   Mild horizontal nystagmus of both eyes   Eyes: No scleral icterus.   Neck: Normal range of motion. Neck supple. No JVD present.   Cardiovascular: Normal rate and regular rhythm.    Murmur heard.  Pulmonary/Chest: Effort normal. No respiratory distress. She has rales.   Abdominal: Soft. She exhibits no distension.   Multiple abdominal striae   Musculoskeletal: She exhibits edema.   4/5  PF and DF, 4/5 at the knee, slight improvement today of the RLE persists today   Neurological: She is oriented to person, place, and time.   Skin: Skin is warm and dry. No rash noted. She is not diaphoretic.   Psychiatric: She has a normal mood and affect. Thought content normal.   Nursing note and vitals reviewed.      Recent Labs      07/22/17   0320  07/23/17   0409   WBC  6.2  7.5   RBC  2.65*  3.10*   HEMOGLOBIN  8.0*  9.6*   HEMATOCRIT  24.7*  29.2*   MCV  93.2  94.2   MCH  30.2  31.0   MCHC  32.4*  32.9*   RDW  50.7*  49.7   PLATELETCT  215  263   MPV  10.9  10.5     Recent Labs      07/22/17   0320  07/23/17   0300  07/24/17   0215   SODIUM  143  144  141   POTASSIUM  3.5*  3.4*  3.6   CHLORIDE  104  104  105   CO2  21  24  19*   GLUCOSE  69  87  109*   BUN  17  13  24*   CREATININE  3.57*  3.75*  5.81*   CALCIUM  8.3*  8.5  9.1                      Assessment/Plan     Sepsis (CMS-[facility]) (present  on admission)  Assessment & Plan  - This is sepsis (without associated acute organ dysfunction).   - sepsis has been ruled OUT, could be autoimmune  - Source evaluation to continue. CTAP is unremarkable, MRI L spine pending.  - has resolved, unclear if truly sepsis at the beginning, could have been a lupus flare, all cultures are NGTD    Uremic encephalopathy (present on admission)  Assessment & Plan  Secondary to non compliance with HD  -nephrology following, tolerating iHD  -mentation has improved today, continue above therapies    Pneumonia (present on admission)  Assessment & Plan  CT looks more like pulmonary edema  -continue PO augmentin    Acute hypoxemic respiratory failure (CMS-HCC) (present on admission)  Assessment & Plan  Improved s/p HD  Cont to follow  -unlikely to be HCAP, more pulmonary edema from many missed iHD sessions    Narcotic dependence (CMS-HCC) (present on admission)  Assessment & Plan  - continued reduced regimen  - Monitor for withdrawal if any  - Risks of narcotics out weight any benefits at this time    Medical non-compliance (present on admission)  Assessment & Plan  - Counseled and educated    Anemia (present on admission)  Assessment & Plan  -likely multi-factorial, continues to improve on its own  -Ferritin high, Fe low, retic is somewhat appropriate for level of anemia, consider EPO injections and performing daily iron injections for 5 days  -watch closely for hemolysis    ESRD (end stage renal disease) (CMS-HCC) (present on admission)  Assessment & Plan  - Consult to Dr Gotti    Leg weakness (present on admission)  Assessment & Plan  - RLE, below knee, appears to be nearly at her baseline now  - MRI lumbar pending, she is open to getting it done today  - DVT Duplex is negative  - May be uremic  - If fails to improve neurology consult, more improvement noted today  - Neuro checks Q4 hours    Low back pain (present on admission)  Assessment & Plan  - Poor historian. Flank vs  lumbar. Tender in both area  - MRI lumbar spine is pending, she refused this AM, strongly encouraged her to allow us to to do this study.    HTN (hypertension) (present on admission)  Assessment & Plan  -remains high, but better controlled now  -continue IV labetolol PRN, but increased dose to 20 mg  -should continue to improve with iHD  -continue norvasc 10 mg daily  -continue coreg 6.25 mg BID and uptitrate PRN    Lupus (CMS-HCC) (present on admission)  Assessment & Plan  - ESR is very high, non specific, possible lupus flare? Once infection is ruled out, could consider pulsed IV steroids  -KARLA titer 1:640, which is elevated, c/f flare causing some of her symptoms  -continue prednisone 20 mg daily, watch for blossoming infection, will do short 5 day course      Radiology images reviewed, EKG reviewed, Labs reviewed and Medications reviewed  Taylor catheter: No Taylor        DVT prophylaxis - mechanical: SCDs  Ulcer prophylaxis: Not indicated  Antibiotics: Treating active infection/contamination beyond 24 hours perioperative coverage

## 2017-07-24 NOTE — PALLIATIVE CARE
PALLIATIVE CARE:    Pt in dialysis.  Discussed with Dr. Liu and RN staff.  Thank you for letting me know when pt returns to room.    Thank you for allowing Palliative Care to support this pt.  Contact [phone] for additional assistance, questions or concerns.

## 2017-07-24 NOTE — CARE PLAN
Problem: Nutritional:  Goal: Achieve adequate nutritional intake  Patient will consume 50% of meals and supplements.   Outcome: PROGRESSING SLOWER THAN EXPECTED  Not eating today  25-50% of most meals previously  document intake of all meals and supplements as % taken in ADL's to provide interdisciplinary communication across all shifts

## 2017-07-24 NOTE — PROGRESS NOTES
Pt returned to the floor, fatigued and drowsy but easy to arouse. Bruit heard and palpated on the right arm. Pt reports mild tenderness at access sight. Dressing is clean dry and intact.

## 2017-07-24 NOTE — PROGRESS NOTES
Nephrology/HD note    Patient with ESRD/HD/SLE  Seen and examined during dialysis  Tolerates well  VS stable  3 H/4 K/ UF 1-2 L  Epogen 5000  Please see dialysis flow sheet for details

## 2017-07-24 NOTE — PROGRESS NOTES
Paged MD Oakley regarding patient's blood pressure. MD updated on patient's range of blood pressures, medical history, and medications previously given. MD Oakley to place new orders.

## 2017-07-25 PROBLEM — E87.6 HYPOKALEMIA: Status: ACTIVE | Noted: 2017-07-25

## 2017-07-25 PROBLEM — F32.A DEPRESSION: Status: ACTIVE | Noted: 2017-07-25

## 2017-07-25 PROBLEM — E61.1 IRON DEFICIENCY: Status: ACTIVE | Noted: 2017-07-25

## 2017-07-25 LAB
NUCLEAR IGG SER QL IA: DETECTED
NUCLEAR IGG TITR SER IF: ABNORMAL {TITER}

## 2017-07-25 PROCEDURE — 700111 HCHG RX REV CODE 636 W/ 250 OVERRIDE (IP): Performed by: INTERNAL MEDICINE

## 2017-07-25 PROCEDURE — A9270 NON-COVERED ITEM OR SERVICE: HCPCS | Performed by: HOSPITALIST

## 2017-07-25 PROCEDURE — A9270 NON-COVERED ITEM OR SERVICE: HCPCS | Performed by: INTERNAL MEDICINE

## 2017-07-25 PROCEDURE — 700101 HCHG RX REV CODE 250: Performed by: INTERNAL MEDICINE

## 2017-07-25 PROCEDURE — 700102 HCHG RX REV CODE 250 W/ 637 OVERRIDE(OP): Performed by: INTERNAL MEDICINE

## 2017-07-25 PROCEDURE — 770020 HCHG ROOM/CARE - TELE (206)

## 2017-07-25 PROCEDURE — 700102 HCHG RX REV CODE 250 W/ 637 OVERRIDE(OP): Performed by: HOSPITALIST

## 2017-07-25 PROCEDURE — 99233 SBSQ HOSP IP/OBS HIGH 50: CPT | Performed by: FAMILY MEDICINE

## 2017-07-25 PROCEDURE — A9270 NON-COVERED ITEM OR SERVICE: HCPCS | Performed by: FAMILY MEDICINE

## 2017-07-25 PROCEDURE — 700102 HCHG RX REV CODE 250 W/ 637 OVERRIDE(OP): Performed by: FAMILY MEDICINE

## 2017-07-25 RX ORDER — FERROUS SULFATE 325(65) MG
325 TABLET ORAL 2 TIMES DAILY WITH MEALS
Status: DISCONTINUED | OUTPATIENT
Start: 2017-07-25 | End: 2017-08-04 | Stop reason: HOSPADM

## 2017-07-25 RX ADMIN — CARVEDILOL 6.25 MG: 6.25 TABLET, FILM COATED ORAL at 18:04

## 2017-07-25 RX ADMIN — AMOXICILLIN AND CLAVULANATE POTASSIUM 1 TABLET: 500; 125 TABLET, FILM COATED ORAL at 21:23

## 2017-07-25 RX ADMIN — ONDANSETRON 4 MG: 4 TABLET, ORALLY DISINTEGRATING ORAL at 08:23

## 2017-07-25 RX ADMIN — Medication 325 MG: at 18:04

## 2017-07-25 RX ADMIN — PREDNISONE 20 MG: 20 TABLET ORAL at 08:24

## 2017-07-25 RX ADMIN — CARVEDILOL 6.25 MG: 6.25 TABLET, FILM COATED ORAL at 08:24

## 2017-07-25 RX ADMIN — FAMOTIDINE 20 MG: 20 TABLET, FILM COATED ORAL at 08:23

## 2017-07-25 RX ADMIN — OXYCODONE HYDROCHLORIDE 20 MG: 10 TABLET ORAL at 00:44

## 2017-07-25 RX ADMIN — HEPARIN 500 UNITS: 100 SYRINGE at 05:28

## 2017-07-25 RX ADMIN — LABETALOL HYDROCHLORIDE 20 MG: 5 INJECTION INTRAVENOUS at 05:24

## 2017-07-25 RX ADMIN — HYDROXYCHLOROQUINE SULFATE 200 MG: 200 TABLET, FILM COATED ORAL at 21:23

## 2017-07-25 RX ADMIN — AMLODIPINE BESYLATE 10 MG: 10 TABLET ORAL at 08:23

## 2017-07-25 RX ADMIN — AMOXICILLIN AND CLAVULANATE POTASSIUM 1 TABLET: 500; 125 TABLET, FILM COATED ORAL at 08:23

## 2017-07-25 RX ADMIN — LABETALOL HYDROCHLORIDE 20 MG: 5 INJECTION INTRAVENOUS at 00:35

## 2017-07-25 ASSESSMENT — ENCOUNTER SYMPTOMS
HEARTBURN: 0
NAUSEA: 0
EYES NEGATIVE: 1
ORTHOPNEA: 0
WHEEZING: 0
PALPITATIONS: 0
NAUSEA: 1
DIZZINESS: 1
CHILLS: 0
HEMOPTYSIS: 0
BACK PAIN: 1
FEVER: 0
COUGH: 0
FOCAL WEAKNESS: 1
FOCAL WEAKNESS: 0
DIARRHEA: 0
ABDOMINAL PAIN: 0
SPEECH CHANGE: 0
SORE THROAT: 0
SEIZURES: 0
BLURRED VISION: 0
SENSORY CHANGE: 0
SHORTNESS OF BREATH: 0
DIZZINESS: 0
VOMITING: 0
MYALGIAS: 0
HEADACHES: 0
WEAKNESS: 1

## 2017-07-25 ASSESSMENT — PAIN SCALES - GENERAL
PAINLEVEL_OUTOF10: 7
PAINLEVEL_OUTOF10: 5

## 2017-07-25 NOTE — CARE PLAN
Problem: Safety  Goal: Will remain free from injury  Outcome: PROGRESSING AS EXPECTED  Patient without hospital acquired injury or fall so far this admission. Safety precautions including fall precautions continued and explained to patient. Patient in agreement with plan of care.

## 2017-07-25 NOTE — PROGRESS NOTES
Shadia Manrique Fall Risk Assessment:     Last Known Fall: Within the last month  Mobility: Immobilized/requires assist of one person  Medications: Cardiovascular and central nervous system meds  Mental Status/LOC/Awareness: Lethargic/oriented to person only  Toileting Needs: No needs  Volume/Electrolyte Status: No problems  Communication/Sensory: Visual (Glasses)/hearing deficit  Behavior: Appropriate behavior  Shadia Manrique Fall Risk Total: 11  Fall Risk Level: MODERATE RISK    Universal Fall Precautions:  call light/belongings in reach, bed in low position and locked, siderails up x 2, use non-slip footwear, adequate lighting    Fall Risk Level Interventions:    TRIAL (TELE 8, NEURO, MED GABBIE 5) Moderate Fall Risk Interventions  Place yellow fall risk ID band on patient: refused  Provide patient/family education based on risk assessment : verified  Educate patient/family to call staff for assistance when getting out of bed: verified  Place fall precaution signage outside patient door: verified  Utilize bed/chair fall alarm: verified     Patient Specific Interventions:     Medication: review medications with patient and family  Mental Status/LOC/Awareness: reorient patient, reinforce falls education and check on patient hourly  Toileting: not applicable  Volume/Electrolyte Status: monitor abnormal lab values  Communication/Sensory: update plan of care on whiteboard  Behavioral: not applicable  Mobility: utilize bed/chair fall alarm

## 2017-07-25 NOTE — PROGRESS NOTES
Shadia Manrique Fall Risk Assessment:     Last Known Fall: Within the last month  Mobility: Immobilized/requires assist of one person  Medications: Cardiovascular and central nervous system meds  Mental Status/LOC/Awareness: Lethargic/oriented to person only  Toileting Needs: No needs  Volume/Electrolyte Status: No problems  Communication/Sensory: Visual (Glasses)/hearing deficit  Behavior: Appropriate behavior  Shadia Manrique Fall Risk Total: 11  Fall Risk Level: MODERATE RISK    Universal Fall Precautions:  call light/belongings in reach, bed in low position and locked, siderails up x 2, use non-slip footwear, adequate lighting, clutter free and spill free environment, educate on level of risk, educate to call for assistance, wheelchairs and assistive devices out of sight    Fall Risk Level Interventions:    TRIAL (TELE 8, NEURO, MED GABBIE 5) Moderate Fall Risk Interventions  Place yellow fall risk ID band on patient: refused  Provide patient/family education based on risk assessment : verified  Educate patient/family to call staff for assistance when getting out of bed: verified  Place fall precaution signage outside patient door: verified  Utilize bed/chair fall alarm: verified     Patient Specific Interventions:     Medication: limit combination of prn medications  Mental Status/LOC/Awareness: check on patient hourly and utilize bed/chair fall alarm  Toileting: monitor intake and output/use of appropriate interventions  Volume/Electrolyte Status: ensure patient remains hydrated, monitor abnormal lab values and ensure IV fluids are appropriate  Communication/Sensory: update plan of care on whiteboard  Behavioral: engage patient in daily activities, administer medication as ordered and instruct/reinforce fall program rationale  Mobility: utilize bed/chair fall alarm, ensure bed is locked and in lowest position and provide appropriate assistive device

## 2017-07-25 NOTE — PROGRESS NOTES
Received report from day shift RN. Twelve hour chart check completed. Patient assessed. Patient A and 0 X 1, oriented only to self. Patient re-oriented appropriately.  Bed alarm is on.   Patient assessed for non-verbal descriptors of pain. Patient educated on plan of care for the evening including Medications per MAR, monitoring vital signs, safety, pain control, and rest. Patient educated to call for assistance. No evidence of learning at this time.

## 2017-07-25 NOTE — PROGRESS NOTES
"Into patient room to give PRN blood pressure medication. Patient alert and awake. Patient asking for pain medication. Patient assessed and verbalizing pain. Patient improved neurological status. Patient now A and O X 3 and knows month and year but gets date incorrect. Patient states that she is here for missing dialysis and knows that she is a [facility]. Patient asking for ice water and popsicle stating \"My mouth feels dry, I haven't been eating.\" Patient provided with ice water, popsicle, and lip moisturizer.   "

## 2017-07-25 NOTE — CARE PLAN
Problem: Communication  Goal: The ability to communicate needs accurately and effectively will improve  Intervention: [place] patient and significant other/support system to call light to alert staff of needs  Patient re-oriented to environment as needed. Patient A and O X 1.       Problem: Safety  Goal: Will remain free from injury  Outcome: PROGRESSING AS EXPECTED  Bed in low position.  Treaded socks on patient.  Call light within reach.  Saline locked.  Bed alarms on and verified correct.

## 2017-07-25 NOTE — CONSULTS
"Reason for PC Consult: Advance Care Planning    Consulted by: Alvaro Gaviria MD    Assessment:  General: 37yo lady BIB EMS from home and admitted through ED to CICU 7/19 for uremic encephalopathy, missed 45 days of HD, transfer floor 7/22, HD today.  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 Unable to determine- this was not addressed at this encounter  Has a  or spiritual provider visit been requested? Unable to determine    Palliative Performance Scale: 50    Advanced Directive: None-    DPOA:  -    POLST:No-      Code Status: Full-      Outcome:  Introduced self and role of Palliative Care.  Attempted to assess pt's understanding of her current medical status, overall health picture, and options for future care.  Pt with flat affect, generally single word answers, often mouthing no or slight shake of head.  Pt does state that she was diagnosed a year ago with lupus when she was found to be in renal failure.    Attempted to explore pt's values, beliefs, and preferences in order to identify GOC.  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  Pt did not respond to many questions, including if she has had suicidal ideation.      Attempted to get clarity on pt's choices including code status, naming a DPOA-HC, and aggressiveness of care.  Pt said, \"no\" when asked if she would want either her grandmother, parents, or siblings to be DPOA -- pt stated \"I want to\" when asked who to make decisions if she couldn't.  Suggested Declaration to Physician so her wishes would be known.  Pt stated, \"I don't want to talk about this.\"  Validated pt's difficult diagnosis, loss of independence and sense of self.  " "Acknowledged difficulty of conversation.  Attempted to have pt's decision regarding lumbar MRI that is pending -- pt stated \"I already answered that.  You're obnoxious.\"  Asked yes or no - pt stated \"No.\"    Informed pt that PC would continue to follow to support her.  Pt did give permission to speak with her grandmother.  Pt with no questions.  PC contact information given.     Updated:  Dr. Gaviria    Plan: PC to f/u when pt hopefully more communicative.    Thank you for allowing Palliative Care to participate in this patient's care. Please feel free to call [phone] with any questions or concerns.  "

## 2017-07-25 NOTE — PROGRESS NOTES
[facility] Hospitalist Progress Note    Date of Service: 2017    Chief Complaint  36 y.o. female admitted 2017 with Uremic Encephalopathy, Respiratory failure, pulmonary edema secondary to volume overload secondary to noncompliance with HD for ESRD with Lupus nephritis. Noted to have right leg weakness with chronic back pain.     Interval Problem Update  Enceph - more alert and oriented  Resp failure - off O2  Leg weakness - MRI pending  HTN - controlled    Consultants/Specialty  Nephrology    Disposition  MRI pending, HD schedule per Nephrology        Review of Systems   Constitutional: Positive for malaise/fatigue. Negative for fever and chills.   HENT: Negative for sore throat.    Eyes: Negative for blurred vision.   Respiratory: Negative for cough, shortness of breath and wheezing.    Cardiovascular: Negative for chest pain.   Gastrointestinal: Positive for nausea. Negative for heartburn, vomiting, abdominal pain and diarrhea.   Musculoskeletal: Positive for back pain.   Neurological: Positive for dizziness, focal weakness and weakness. Negative for speech change, seizures and headaches.      Physical Exam  Laboratory/Imaging   Hemodynamics  Temp (24hrs), Av.7 °C (98 °F), Min:36.5 °C (97.7 °F), Max:36.9 °C (98.4 °F)   Temperature: 36.5 °C (97.7 °F)  Pulse  Av.4  Min: 58  Max: 132   Blood Pressure: 121/72 mmHg      Respiratory      Respiration: 16, Pulse Oximetry: 90 %     Work Of Breathing / Effort: Mild  RUL Breath Sounds: Clear, RML Breath Sounds: Diminished, RLL Breath Sounds: Diminished, LASHANDA Breath Sounds: Clear, LLL Breath Sounds: Diminished    Fluids    Intake/Output Summary (Last 24 hours) at 17 1613  Last data filed at 17 0500   Gross per 24 hour   Intake    360 ml   Output      0 ml   Net    360 ml       Nutrition  Orders Placed This Encounter   Procedures   • DIET ORDER     Standing Status: Standing      Number of Occurrences: 1      Standing Expiration Date:      Order  Specific Question:  Diet:     Answer:  Renal [8]     Physical Exam   Constitutional: She is oriented to person, place, and time. She appears well-developed and well-nourished.   HENT:   Head: Normocephalic and atraumatic.   Eyes: Conjunctivae are normal. Pupils are equal, round, and reactive to light.   Neck: No tracheal deviation present. No thyromegaly present.   Cardiovascular: Normal rate and regular rhythm.    Pulmonary/Chest: Effort normal and breath sounds normal.   Abdominal: Soft. Bowel sounds are normal. She exhibits no distension. There is no tenderness.   Lymphadenopathy:     She has no cervical adenopathy.   Neurological: She is alert and oriented to person, place, and time.   Slow to respond  MMT BUE ad LLE 5/5, RLE 4+/5   Skin: Skin is warm and dry.   Nursing note and vitals reviewed.      Recent Labs      07/23/17   0409   WBC  7.5   RBC  3.10*   HEMOGLOBIN  9.6*   HEMATOCRIT  29.2*   MCV  94.2   MCH  31.0   MCHC  32.9*   RDW  49.7   PLATELETCT  263   MPV  10.5     Recent Labs      07/23/17   0300  07/24/17   0215   SODIUM  144  141   POTASSIUM  3.4*  3.6   CHLORIDE  104  105   CO2  24  19*   GLUCOSE  87  109*   BUN  13  24*   CREATININE  3.75*  5.81*   CALCIUM  8.5  9.1                      Assessment/Plan     Medical non-compliance (present on admission)  Assessment & Plan  Counseling    Uremic encephalopathy (present on admission)  Assessment & Plan  resolving    ESRD (end stage renal disease) (CMS-HCC) (present on admission)  Assessment & Plan  HD schedule per Nephrology    HTN (hypertension) (present on admission)  Assessment & Plan  Norvasc, Coreg    Lupus (CMS-HCC) (present on admission)  Assessment & Plan  Plaquenil, Prednisone    Acute hypoxemic respiratory failure (CMS-HCC) (present on admission)  Assessment & Plan  Off O2     Iron deficiency  Assessment & Plan  Start Fe    Hypokalemia  Assessment & Plan  Follow bmp    Narcotic dependence (CMS-HCC) (present on admission)  Assessment &  Plan  Judicious pain control    Anemia (present on admission)  Assessment & Plan  transfused PRBC, follow cbc, Epogen?    Leg weakness (present on admission)  Assessment & Plan  for MRI L spine    Low back pain (present on admission)  Assessment & Plan  For MRI L spine    Depression  Assessment & Plan  Resume Wellbutrin and Trazodone soon      Labs reviewed, Medications reviewed and Radiology images reviewed  Taylor catheter: No Taylor        DVT prophylaxis - mechanical: SCDs  Ulcer prophylaxis: Yes

## 2017-07-25 NOTE — PROGRESS NOTES
Nephrology Progress Note, Adult, Complex               Author: Leana Sivakumar Date & Time created: 7/25/2017  2:40 PM     Interval History:  36-year-old female with end-stage renal disease secondary to lupus nephritis, who did not come to the dialysis unit for over a month and came in with scott uremia. The patient had her first dialysis yesterday, low flow and tolerated it relatively well. Her mental status is improved. Apparently she was planning on switching to peritoneal dialysis. She was evaluated by the peritoneal dialysis unit, but was not deemed an appropriate candidate due to noncompliance.  Doing better.  AAO  HD MWF    Review of Systems:  Review of Systems   Constitutional: Positive for malaise/fatigue. Negative for fever and chills.   HENT: Negative.    Eyes: Negative.    Respiratory: Negative for cough, hemoptysis, shortness of breath and wheezing.    Cardiovascular: Negative for chest pain, palpitations, orthopnea and leg swelling.   Gastrointestinal: Negative for heartburn, nausea, vomiting and abdominal pain.   Musculoskeletal: Positive for back pain and joint pain. Negative for myalgias.   Skin: Negative.    Neurological: Positive for weakness. Negative for dizziness, sensory change and focal weakness.       Physical Exam:  Physical Exam   Constitutional: She appears well-developed and well-nourished. No distress.   HENT:   Head: Normocephalic and atraumatic.   Eyes: Conjunctivae and EOM are normal. Pupils are equal, round, and reactive to light.   Neck: Normal range of motion. Neck supple.   Cardiovascular: Normal rate and regular rhythm.  Exam reveals no gallop and no friction rub.    Pulmonary/Chest: Effort normal and breath sounds normal.   Abdominal: Soft. Bowel sounds are normal. She exhibits no distension. There is no tenderness.   Musculoskeletal: She exhibits no edema or tenderness.   Neurological: She is alert. No cranial nerve deficit.   Skin: Skin is warm and dry. No rash noted. No  erythema.       Labs:        Invalid input(s): [id number]      Recent Labs      17   0300  17   0215   SODIUM  144  141   POTASSIUM  3.4*  3.6   CHLORIDE  104  105   CO2  24  19*   BUN  13  24*   CREATININE  3.75*  5.81*   MAGNESIUM  2.0  2.3   CALCIUM  8.5  9.1     Recent Labs      17   0300  17   0215   GLUCOSE  87  109*     Recent Labs      17   0409   RBC  3.10*   HEMOGLOBIN  9.6*   HEMATOCRIT  29.2*   PLATELETCT  263     Recent Labs      17   0409   WBC  7.5   NEUTSPOLYS  63.30   LYMPHOCYTES  18.20*   MONOCYTES  10.90   EOSINOPHILS  4.20   BASOPHILS  1.50           Hemodynamics:  Temp (24hrs), Av.7 °C (98 °F), Min:36.5 °C (97.7 °F), Max:36.9 °C (98.4 °F)  Temperature: 36.7 °C (98 °F)  Pulse  Av.4  Min: 58  Max: 132  Blood Pressure: 122/86 mmHg     Respiratory:    Respiration: 16, Pulse Oximetry: 90 %     Work Of Breathing / Effort: Mild  RUL Breath Sounds: Clear, RML Breath Sounds: Diminished, RLL Breath Sounds: Diminished, LASHANDA Breath Sounds: Clear, LLL Breath Sounds: Diminished  Fluids:    Intake/Output Summary (Last 24 hours) at 17 1440  Last data filed at 17 0500   Gross per 24 hour   Intake    360 ml   Output      0 ml   Net    360 ml     Weight: 69.9 kg (154 lb 1.6 oz)  GI/Nutrition:  Orders Placed This Encounter   Procedures   • DIET ORDER     Standing Status: Standing      Number of Occurrences: 1      Standing Expiration Date:      Order Specific Question:  Diet:     Answer:  Renal [8]     Medical Decision Making, by Problem:  Active Hospital Problems    Diagnosis   • Sepsis (CMS-formerly [facility]) [A41.9]   • Uremic encephalopathy [G93.41, N19]   • Pneumonia [J18.9]   • Acute hypoxemic respiratory failure (CMS-formerly [facility]) [J96.01]   • Asterixis [R27.8]   • Anemia [D64.9]   • ESRD (end stage renal disease) (CMS-formerly [facility]) [N18.6]   • Leg weakness [R29.898]   • Low back pain [M54.5]   • HTN (hypertension) [I10]   • Lupus (CMS-HCC) [M32.9]   • Medical non-compliance  [Z91.19]   • Narcotic dependence (CMS-HCC) [F11.20]     1. End-stage renal disease, noncompliance/HD  2. Uremia due to noncompliance with dialysis  3. Uremic pericarditis  4. History of lupus nephritis  5. Anemia, Hgb better -on epogen     -HD tomorrow   -renal diet   -all meds to renal doses      Labs reviewed and Medications reviewed

## 2017-07-26 ENCOUNTER — APPOINTMENT (OUTPATIENT)
Dept: RADIOLOGY | Facility: MEDICAL CENTER | Age: 37
DRG: 871 | End: 2017-07-26
Attending: INTERNAL MEDICINE
Payer: MEDICARE

## 2017-07-26 LAB
ANION GAP SERPL CALC-SCNC: 14 MMOL/L (ref 0–11.9)
ANISOCYTOSIS BLD QL SMEAR: ABNORMAL
BASOPHILS # BLD AUTO: 0 % (ref 0–1.8)
BASOPHILS # BLD: 0 K/UL (ref 0–0.12)
BUN SERPL-MCNC: 42 MG/DL (ref 8–22)
CALCIUM SERPL-MCNC: 9.6 MG/DL (ref 8.5–10.5)
CH50 SERPL-ACNC: 65 CAE UNITS (ref 60–144)
CHLORIDE SERPL-SCNC: 95 MMOL/L (ref 96–112)
CO2 SERPL-SCNC: 27 MMOL/L (ref 20–33)
CREAT SERPL-MCNC: 7.69 MG/DL (ref 0.5–1.4)
EOSINOPHIL # BLD AUTO: 0 K/UL (ref 0–0.51)
EOSINOPHIL NFR BLD: 0 % (ref 0–6.9)
ERYTHROCYTE [DISTWIDTH] IN BLOOD BY AUTOMATED COUNT: 55.8 FL (ref 35.9–50)
GFR SERPL CREATININE-BSD FRML MDRD: 6 ML/MIN/1.73 M 2
GLUCOSE SERPL-MCNC: 115 MG/DL (ref 65–99)
HCT VFR BLD AUTO: 41.2 % (ref 37–47)
HGB BLD-MCNC: 12.5 G/DL (ref 12–16)
HYPOCHROMIA BLD QL SMEAR: ABNORMAL
LYMPHOCYTES # BLD AUTO: 2.69 K/UL (ref 1–4.8)
LYMPHOCYTES NFR BLD: 18.8 % (ref 22–41)
MACROCYTES BLD QL SMEAR: ABNORMAL
MANUAL DIFF BLD: NORMAL
MCH RBC QN AUTO: 31.2 PG (ref 27–33)
MCHC RBC AUTO-ENTMCNC: 30.3 G/DL (ref 33.6–35)
MCV RBC AUTO: 102.7 FL (ref 81.4–97.8)
MONOCYTES # BLD AUTO: 1.1 K/UL (ref 0–0.85)
MONOCYTES NFR BLD AUTO: 7.7 % (ref 0–13.4)
MORPHOLOGY BLD-IMP: NORMAL
NEUTROPHILS # BLD AUTO: 10.51 K/UL (ref 2–7.15)
NEUTROPHILS NFR BLD: 73.5 % (ref 44–72)
NRBC # BLD AUTO: 0.1 K/UL
NRBC BLD AUTO-RTO: 0.7 /100 WBC
PLATELET # BLD AUTO: 270 K/UL (ref 164–446)
PLATELET BLD QL SMEAR: NORMAL
PMV BLD AUTO: 10.4 FL (ref 9–12.9)
POLYCHROMASIA BLD QL SMEAR: NORMAL
POTASSIUM SERPL-SCNC: 4 MMOL/L (ref 3.6–5.5)
RBC # BLD AUTO: 4.01 M/UL (ref 4.2–5.4)
RBC BLD AUTO: PRESENT
ROULEAUX BLD QL SMEAR: SLIGHT
SODIUM SERPL-SCNC: 136 MMOL/L (ref 135–145)
WBC # BLD AUTO: 14.3 K/UL (ref 4.8–10.8)

## 2017-07-26 PROCEDURE — 85027 COMPLETE CBC AUTOMATED: CPT

## 2017-07-26 PROCEDURE — 99233 SBSQ HOSP IP/OBS HIGH 50: CPT | Performed by: FAMILY MEDICINE

## 2017-07-26 PROCEDURE — 90935 HEMODIALYSIS ONE EVALUATION: CPT

## 2017-07-26 PROCEDURE — A9270 NON-COVERED ITEM OR SERVICE: HCPCS | Performed by: INTERNAL MEDICINE

## 2017-07-26 PROCEDURE — 700102 HCHG RX REV CODE 250 W/ 637 OVERRIDE(OP): Performed by: FAMILY MEDICINE

## 2017-07-26 PROCEDURE — 85007 BL SMEAR W/DIFF WBC COUNT: CPT

## 2017-07-26 PROCEDURE — A9270 NON-COVERED ITEM OR SERVICE: HCPCS | Performed by: FAMILY MEDICINE

## 2017-07-26 PROCEDURE — 770020 HCHG ROOM/CARE - TELE (206)

## 2017-07-26 PROCEDURE — 700111 HCHG RX REV CODE 636 W/ 250 OVERRIDE (IP): Performed by: HOSPITALIST

## 2017-07-26 PROCEDURE — 700111 HCHG RX REV CODE 636 W/ 250 OVERRIDE (IP): Performed by: INTERNAL MEDICINE

## 2017-07-26 PROCEDURE — 700102 HCHG RX REV CODE 250 W/ 637 OVERRIDE(OP): Performed by: INTERNAL MEDICINE

## 2017-07-26 PROCEDURE — 306581 SET INFUSION,POWERLOC 20G X 1: Performed by: FAMILY MEDICINE

## 2017-07-26 PROCEDURE — 36415 COLL VENOUS BLD VENIPUNCTURE: CPT

## 2017-07-26 PROCEDURE — 80048 BASIC METABOLIC PNL TOTAL CA: CPT

## 2017-07-26 PROCEDURE — 306580 SET INFUSION,POWERLOC 20G X.75: Performed by: FAMILY MEDICINE

## 2017-07-26 RX ORDER — BUPROPION HYDROCHLORIDE 75 MG/1
75 TABLET ORAL 2 TIMES DAILY
Status: DISCONTINUED | OUTPATIENT
Start: 2017-07-26 | End: 2017-08-04 | Stop reason: HOSPADM

## 2017-07-26 RX ORDER — TRAZODONE HYDROCHLORIDE 50 MG/1
50 TABLET ORAL
Status: DISCONTINUED | OUTPATIENT
Start: 2017-07-26 | End: 2017-08-04 | Stop reason: HOSPADM

## 2017-07-26 RX ORDER — PREGABALIN 100 MG/1
100 CAPSULE ORAL 3 TIMES DAILY
Status: DISCONTINUED | OUTPATIENT
Start: 2017-07-26 | End: 2017-08-01

## 2017-07-26 RX ORDER — TIZANIDINE 4 MG/1
4 TABLET ORAL
Status: DISCONTINUED | OUTPATIENT
Start: 2017-07-26 | End: 2017-08-04 | Stop reason: HOSPADM

## 2017-07-26 RX ORDER — BUPROPION HYDROCHLORIDE 150 MG/1
150 TABLET ORAL EVERY MORNING
Status: DISCONTINUED | OUTPATIENT
Start: 2017-07-26 | End: 2017-07-26

## 2017-07-26 RX ADMIN — TIZANIDINE 4 MG: 4 TABLET ORAL at 20:36

## 2017-07-26 RX ADMIN — HYDROXYCHLOROQUINE SULFATE 200 MG: 200 TABLET, FILM COATED ORAL at 20:35

## 2017-07-26 RX ADMIN — DIPHENHYDRAMINE HYDROCHLORIDE 25 MG: 50 INJECTION, SOLUTION INTRAMUSCULAR; INTRAVENOUS at 06:31

## 2017-07-26 RX ADMIN — ONDANSETRON 4 MG: 4 TABLET, ORALLY DISINTEGRATING ORAL at 07:39

## 2017-07-26 RX ADMIN — Medication 325 MG: at 18:41

## 2017-07-26 RX ADMIN — FAMOTIDINE 20 MG: 20 TABLET, FILM COATED ORAL at 12:24

## 2017-07-26 RX ADMIN — PREGABALIN 100 MG: 100 CAPSULE ORAL at 20:34

## 2017-07-26 RX ADMIN — TRAZODONE HYDROCHLORIDE 50 MG: 50 TABLET ORAL at 20:34

## 2017-07-26 RX ADMIN — PREDNISONE 20 MG: 20 TABLET ORAL at 12:24

## 2017-07-26 ASSESSMENT — ENCOUNTER SYMPTOMS
WEAKNESS: 1
BLURRED VISION: 0
DIARRHEA: 0
COUGH: 0
CHILLS: 0
VOMITING: 0
ABDOMINAL PAIN: 0
BACK PAIN: 1
HEARTBURN: 0
WHEEZING: 0
FEVER: 0
DIZZINESS: 1
SPEECH CHANGE: 0
SHORTNESS OF BREATH: 0
SEIZURES: 0
FOCAL WEAKNESS: 1
NAUSEA: 1
SORE THROAT: 0
HEADACHES: 0

## 2017-07-26 ASSESSMENT — PAIN SCALES - GENERAL
PAINLEVEL_OUTOF10: 1
PAINLEVEL_OUTOF10: 2

## 2017-07-26 NOTE — PROGRESS NOTES
Nephrology Progress Note, Adult, Complex               Author: Leana Sivakumar Date & Time created: 7/26/2017  12:39 PM     Interval History:  36-year-old female with end-stage renal disease secondary to lupus nephritis, who did not come to the dialysis unit for over a month and came in with scott uremia. The patient had her first dialysis yesterday, low flow and tolerated it relatively well. Her mental status is improved. Apparently she was planning on switching to peritoneal dialysis. She was evaluated by the peritoneal dialysis unit, but was not deemed an appropriate candidate due to noncompliance.  Seen and examined during HD -very somnolent  Low BP    Review of Systems:  Review of Systems   Unable to perform ROS: medical condition       Physical Exam:  Physical Exam   Constitutional: She appears well-developed and well-nourished. No distress.   HENT:   Head: Normocephalic and atraumatic.   Eyes: Conjunctivae and EOM are normal. Pupils are equal, round, and reactive to light.   Neck: Normal range of motion. Neck supple.   Cardiovascular: Normal rate and regular rhythm.  Exam reveals no gallop and no friction rub.    Pulmonary/Chest: Effort normal and breath sounds normal.   Abdominal: Soft. Bowel sounds are normal. She exhibits no distension. There is no tenderness.   Musculoskeletal: She exhibits no edema or tenderness.   Neurological: She is alert. No cranial nerve deficit.   Skin: Skin is warm and dry. No rash noted. No erythema.       Labs:        Invalid input(s): [id number]      Recent Labs      07/24/17 0215 07/26/17   0539   SODIUM  141  136   POTASSIUM  3.6  4.0   CHLORIDE  105  95*   CO2  19*  27   BUN  24*  42*   CREATININE  5.81*  7.69*   MAGNESIUM  2.3   --    CALCIUM  9.1  9.6     Recent Labs      07/24/17   0215  07/26/17   0539   GLUCOSE  109*  115*     Recent Labs      07/26/17   0710   RBC  4.01*   HEMOGLOBIN  12.5   HEMATOCRIT  41.2   PLATELETCT  270     Recent Labs      07/26/17   0710    WBC  14.3*   NEUTSPOLYS  73.50*   LYMPHOCYTES  18.80*   MONOCYTES  7.70   EOSINOPHILS  0.00   BASOPHILS  0.00           Hemodynamics:  Temp (24hrs), Av.5 °C (97.7 °F), Min:36.3 °C (97.3 °F), Max:36.6 °C (97.8 °F)  Temperature: 36.6 °C (97.8 °F)  Pulse  Av.5  Min: 58  Max: 132  Blood Pressure: (!) 91/54 mmHg (RN aware)     Respiratory:    Respiration: 17, Pulse Oximetry: 96 %     Work Of Breathing / Effort: Mild  RUL Breath Sounds: Clear, RML Breath Sounds: Diminished, RLL Breath Sounds: Diminished, LASHANDA Breath Sounds: Clear, LLL Breath Sounds: Diminished  Fluids:  No intake or output data in the 24 hours ending 17 1239  Weight: 75.3 kg (166 lb 0.1 oz)  GI/Nutrition:  Orders Placed This Encounter   Procedures   • DIET ORDER     Standing Status: Standing      Number of Occurrences: 1      Standing Expiration Date:      Order Specific Question:  Diet:     Answer:  Renal [8]     Medical Decision Making, by Problem:  Active Hospital Problems    Diagnosis   • Sepsis (CMS-HCC) [A41.9]   • Uremic encephalopathy [G93.41, N19]   • Pneumonia [J18.9]   • Acute hypoxemic respiratory failure (CMS-[facility]) [J96.01]   • Asterixis [R27.8]   • Anemia [D64.9]   • ESRD (end stage renal disease) (CMS-[facility]) [N18.6]   • Leg weakness [R29.898]   • Low back pain [M54.5]   • HTN (hypertension) [I10]   • Lupus (CMS-[facility]) [M32.9]   • Medical non-compliance [Z91.19]   • Narcotic dependence (CMS-HCC) [F11.20]     1. End-stage renal disease, noncompliance/HD -seen and examined during HD  -please see dialysis flow sheet for details  2. Uremia due to noncompliance with dialysis  3. Uremic pericarditis  4. SLE  5. Anemia, Hgb at goal     -HD MWF   -renal diet   -all meds to renal doses      Labs reviewed and Medications reviewed

## 2017-07-26 NOTE — PROGRESS NOTES
Shadia Manrique Fall Risk Assessment:     Last Known Fall: Within the last month  Mobility: Immobilized/requires assist of one person  Medications: Cardiovascular or central nervous system meds  Mental Status/LOC/Awareness: Lethargic/oriented to person only  Toileting Needs: No needs  Volume/Electrolyte Status: No problems  Communication/Sensory: Neuropathy, Visual (Glasses)/hearing deficit  Behavior: Appropriate behavior  Shadia Manrique Fall Risk Total: 12  Fall Risk Level: MODERATE RISK    Universal Fall Precautions:  call light/belongings in reach, bed in low position and locked, wheelchairs and assistive devices out of sight, siderails up x 2, use non-slip footwear, adequate lighting, clutter free and spill free environment, educate on level of risk, educate to call for assistance    Fall Risk Level Interventions:    TRIAL (TE[employer], NEURO, MED GABBIE 5) Moderate Fall Risk Interventions  Place yellow fall risk ID band on patient: completed  Provide patient/family education based on risk assessment : completed  Educate patient/family to call staff for assistance when getting out of bed: completed  Place fall precaution signage outside patient door: completed  Utilize bed/chair fall alarm: completed     Patient Specific Interventions:     Medication: review medications with patient and family  Mental Status/LOC/Awareness: utilize bed/chair fall alarm  Toileting: not applicable  Volume/Electrolyte Status: monitor abnormal lab values  Communication/Sensory: update plan of care on whiteboard  Behavioral: administer medication as ordered  Mobility: utilize bed/chair fall alarm

## 2017-07-26 NOTE — PROGRESS NOTES
Assumed pt care at 1900. Received bedside report from RN. PM assessment completed. AAOx2. Pt denies SOB; c/o pain of 5 on 0 to 10 pain scale (Will administer medication PRN - see MAR). Provided pt with RN and CNA extension numbers on white board and encouraged pt to call when needed. Discussed plan of care for the night, pt verbalizes understanding. VSS. Denies any additional needs at this time. Call light, belongings, and phone within reach. Hourly rounding in effect.

## 2017-07-26 NOTE — PROGRESS NOTES
Hemodialysis ordered by Dr. Shaver. Treatment started at 0810 and ended at 1120 d/t clotted dialyzer and lines. New setting placed. Treatment started. Hypotensive during tx, NS bolus, provided good result. Dr. Shaver notified and aware. Pt stable, vss, no c/o post tx. See flow sheets for details. Net  ml. Report to PETR Galvez RN. Rt ua avg dressing clean, dry, intact.

## 2017-07-26 NOTE — PROGRESS NOTES
[facility]    Date of Service: 2017    Chief Complaint  36 y.o. female admitted 2017 with Uremic Encephalopathy, Respiratory failure, pulmonary edema secondary to volume overload secondary to noncompliance with HD for ESRD with Lupus nephritis. Noted to have right leg weakness with chronic back pain.     Interval Problem Update  Enceph - more alert and oriented  Resp failure - off O2  Leg weakness - MRI pending  HTN - low side    Consultants/Specialty  Nephrology    Disposition  MRI pending, HD schedule per Nephrology        Review of Systems   Constitutional: Positive for malaise/fatigue. Negative for fever and chills.   HENT: Negative for sore throat.    Eyes: Negative for blurred vision.   Respiratory: Negative for cough, shortness of breath and wheezing.    Cardiovascular: Negative for chest pain.   Gastrointestinal: Positive for nausea. Negative for heartburn, vomiting, abdominal pain and diarrhea.   Musculoskeletal: Positive for back pain.   Neurological: Positive for dizziness, focal weakness and weakness. Negative for speech change, seizures and headaches.      Physical Exam  Laboratory/Imaging   Hemodynamics  Temp (24hrs), Av.5 °C (97.7 °F), Min:36.3 °C (97.3 °F), Max:36.6 °C (97.8 °F)   Temperature: 36.6 °C (97.8 °F)  Pulse  Av.5  Min: 58  Max: 132   Blood Pressure: (!) 91/54 mmHg (RN aware)      Respiratory      Respiration: 17, Pulse Oximetry: 96 %     Work Of Breathing / Effort: Mild  RUL Breath Sounds: Clear, RML Breath Sounds: Diminished, RLL Breath Sounds: Diminished, LASHANDA Breath Sounds: Clear, LLL Breath Sounds: Diminished    Fluids  No intake or output data in the 24 hours ending 17 1330    Nutrition  Orders Placed This Encounter   Procedures   • DIET ORDER     Standing Status: Standing      Number of Occurrences: 1      Standing Expiration Date:      Order Specific Question:  Diet:     Answer:  Renal [8]     Physical Exam   Constitutional: She is  oriented to person, place, and time. She appears well-developed and well-nourished.   HENT:   Head: Normocephalic and atraumatic.   Eyes: Conjunctivae are normal. Pupils are equal, round, and reactive to light.   Neck: No tracheal deviation present. No thyromegaly present.   Cardiovascular: Normal rate and regular rhythm.    Pulmonary/Chest: Effort normal and breath sounds normal.   Abdominal: Soft. Bowel sounds are normal. She exhibits no distension. There is no tenderness.   Lymphadenopathy:     She has no cervical adenopathy.   Neurological: She is alert and oriented to person, place, and time.   MMT BUE ad LLE 5/5, RLE 4+/5   Skin: Skin is warm and dry.   Nursing note and vitals reviewed.      Recent Labs      07/26/17   0710   WBC  14.3*   RBC  4.01*   HEMOGLOBIN  12.5   HEMATOCRIT  41.2   MCV  102.7*   MCH  31.2   MCHC  30.3*   RDW  55.8*   PLATELETCT  270   MPV  10.4     Recent Labs      07/24/17   0215  07/26/17   0539   SODIUM  141  136   POTASSIUM  3.6  4.0   CHLORIDE  105  95*   CO2  19*  27   GLUCOSE  109*  115*   BUN  24*  42*   CREATININE  5.81*  7.69*   CALCIUM  9.1  9.6                      Assessment/Plan     Medical non-compliance (present on admission)  Assessment & Plan  Counseling    Uremic encephalopathy (present on admission)  Assessment & Plan  resolving    ESRD (end stage renal disease) (CMS-HCC) (present on admission)  Assessment & Plan  HD schedule per Nephrology    HTN (hypertension) (present on admission)  Assessment & Plan  Hold Norvasc, Coreg    Lupus (CMS-HCC) (present on admission)  Assessment & Plan  Plaquenil, Prednisone    Acute hypoxemic respiratory failure (CMS-HCC) (present on admission)  Assessment & Plan  Off O2     Iron deficiency  Assessment & Plan  FeSO4    Hypokalemia  Assessment & Plan  Follow bmp    Narcotic dependence (CMS-HCC) (present on admission)  Assessment & Plan  Judicious pain control    Anemia (present on admission)  Assessment & Plan  transfused PRBC, follow  cbc, Epogen?    Leg weakness (present on admission)  Assessment & Plan  for MRI L spine    Low back pain (present on admission)  Assessment & Plan  For MRI L spine, resume Lyrica, Tizanidine    Depression  Assessment & Plan  resume Wellbutrin and Trazodone       Labs reviewed, Medications reviewed and Radiology images reviewed  Taylor catheter: No Taylor        DVT prophylaxis - mechanical: SCDs  Ulcer prophylaxis: Yes

## 2017-07-26 NOTE — CARE PLAN
Problem: Pain Management  Goal: Pain level will decrease to patient’s comfort goal  Outcome: PROGRESSING AS EXPECTED  Pt c/o pain of 5 on 0-10 pain scale. Pt medicated appropriately for pain.

## 2017-07-27 LAB
ANION GAP SERPL CALC-SCNC: 13 MMOL/L (ref 0–11.9)
ANISOCYTOSIS BLD QL SMEAR: ABNORMAL
BASOPHILS # BLD AUTO: 0.9 % (ref 0–1.8)
BASOPHILS # BLD: 0.19 K/UL (ref 0–0.12)
BUN SERPL-MCNC: 32 MG/DL (ref 8–22)
CALCIUM SERPL-MCNC: 9.5 MG/DL (ref 8.5–10.5)
CHLORIDE SERPL-SCNC: 98 MMOL/L (ref 96–112)
CO2 SERPL-SCNC: 26 MMOL/L (ref 20–33)
CREAT SERPL-MCNC: 6.41 MG/DL (ref 0.5–1.4)
EOSINOPHIL # BLD AUTO: 0 K/UL (ref 0–0.51)
EOSINOPHIL NFR BLD: 0 % (ref 0–6.9)
ERYTHROCYTE [DISTWIDTH] IN BLOOD BY AUTOMATED COUNT: 55.4 FL (ref 35.9–50)
GFR SERPL CREATININE-BSD FRML MDRD: 7 ML/MIN/1.73 M 2
GLUCOSE SERPL-MCNC: 95 MG/DL (ref 65–99)
HCT VFR BLD AUTO: 37.3 % (ref 37–47)
HGB BLD-MCNC: 11.3 G/DL (ref 12–16)
LYMPHOCYTES # BLD AUTO: 3.21 K/UL (ref 1–4.8)
LYMPHOCYTES NFR BLD: 15.5 % (ref 22–41)
MACROCYTES BLD QL SMEAR: ABNORMAL
MANUAL DIFF BLD: NORMAL
MCH RBC QN AUTO: 30.9 PG (ref 27–33)
MCHC RBC AUTO-ENTMCNC: 30.3 G/DL (ref 33.6–35)
MCV RBC AUTO: 101.9 FL (ref 81.4–97.8)
MONOCYTES # BLD AUTO: 3.21 K/UL (ref 0–0.85)
MONOCYTES NFR BLD AUTO: 15.5 % (ref 0–13.4)
MORPHOLOGY BLD-IMP: NORMAL
MYELOCYTES NFR BLD MANUAL: 0.9 %
NEUTROPHILS # BLD AUTO: 13.91 K/UL (ref 2–7.15)
NEUTROPHILS NFR BLD: 67.2 % (ref 44–72)
NRBC # BLD AUTO: 0.23 K/UL
NRBC BLD AUTO-RTO: 1.1 /100 WBC
PLATELET # BLD AUTO: 251 K/UL (ref 164–446)
PLATELET BLD QL SMEAR: NORMAL
PMV BLD AUTO: 10.7 FL (ref 9–12.9)
POLYCHROMASIA BLD QL SMEAR: NORMAL
POTASSIUM SERPL-SCNC: 4.3 MMOL/L (ref 3.6–5.5)
RBC # BLD AUTO: 3.66 M/UL (ref 4.2–5.4)
RBC BLD AUTO: PRESENT
SODIUM SERPL-SCNC: 137 MMOL/L (ref 135–145)
WBC # BLD AUTO: 20.7 K/UL (ref 4.8–10.8)

## 2017-07-27 PROCEDURE — A9270 NON-COVERED ITEM OR SERVICE: HCPCS | Performed by: HOSPITALIST

## 2017-07-27 PROCEDURE — 700111 HCHG RX REV CODE 636 W/ 250 OVERRIDE (IP): Performed by: INTERNAL MEDICINE

## 2017-07-27 PROCEDURE — 80048 BASIC METABOLIC PNL TOTAL CA: CPT

## 2017-07-27 PROCEDURE — A9270 NON-COVERED ITEM OR SERVICE: HCPCS | Performed by: FAMILY MEDICINE

## 2017-07-27 PROCEDURE — 700102 HCHG RX REV CODE 250 W/ 637 OVERRIDE(OP): Performed by: FAMILY MEDICINE

## 2017-07-27 PROCEDURE — 85027 COMPLETE CBC AUTOMATED: CPT

## 2017-07-27 PROCEDURE — A9270 NON-COVERED ITEM OR SERVICE: HCPCS | Performed by: INTERNAL MEDICINE

## 2017-07-27 PROCEDURE — 700102 HCHG RX REV CODE 250 W/ 637 OVERRIDE(OP): Performed by: HOSPITALIST

## 2017-07-27 PROCEDURE — 700102 HCHG RX REV CODE 250 W/ 637 OVERRIDE(OP): Performed by: INTERNAL MEDICINE

## 2017-07-27 PROCEDURE — 99232 SBSQ HOSP IP/OBS MODERATE 35: CPT | Performed by: FAMILY MEDICINE

## 2017-07-27 PROCEDURE — 770006 HCHG ROOM/CARE - MED/SURG/GYN SEMI*

## 2017-07-27 PROCEDURE — 85007 BL SMEAR W/DIFF WBC COUNT: CPT

## 2017-07-27 RX ORDER — PROMETHAZINE HYDROCHLORIDE 25 MG/1
25 TABLET ORAL EVERY 6 HOURS PRN
Status: DISCONTINUED | OUTPATIENT
Start: 2017-07-27 | End: 2017-08-04 | Stop reason: HOSPADM

## 2017-07-27 RX ADMIN — OXYCODONE HYDROCHLORIDE 20 MG: 10 TABLET ORAL at 06:13

## 2017-07-27 RX ADMIN — PREDNISONE 20 MG: 20 TABLET ORAL at 08:39

## 2017-07-27 RX ADMIN — Medication 325 MG: at 16:21

## 2017-07-27 RX ADMIN — BUPROPION HYDROCHLORIDE 75 MG: 75 TABLET, FILM COATED ORAL at 22:04

## 2017-07-27 RX ADMIN — FAMOTIDINE 20 MG: 20 TABLET, FILM COATED ORAL at 08:39

## 2017-07-27 RX ADMIN — TIZANIDINE 4 MG: 4 TABLET ORAL at 22:04

## 2017-07-27 RX ADMIN — PREGABALIN 100 MG: 100 CAPSULE ORAL at 16:21

## 2017-07-27 RX ADMIN — BUPROPION HYDROCHLORIDE 75 MG: 75 TABLET, FILM COATED ORAL at 08:39

## 2017-07-27 RX ADMIN — ONDANSETRON 4 MG: 4 TABLET, ORALLY DISINTEGRATING ORAL at 06:14

## 2017-07-27 RX ADMIN — PREGABALIN 100 MG: 100 CAPSULE ORAL at 22:04

## 2017-07-27 RX ADMIN — OXYCODONE HYDROCHLORIDE 20 MG: 10 TABLET ORAL at 22:24

## 2017-07-27 RX ADMIN — TRAZODONE HYDROCHLORIDE 50 MG: 50 TABLET ORAL at 22:04

## 2017-07-27 RX ADMIN — Medication 325 MG: at 08:39

## 2017-07-27 RX ADMIN — HEPARIN 500 UNITS: 100 SYRINGE at 00:33

## 2017-07-27 RX ADMIN — OXYCODONE HYDROCHLORIDE 20 MG: 10 TABLET ORAL at 16:21

## 2017-07-27 RX ADMIN — PREGABALIN 100 MG: 100 CAPSULE ORAL at 08:39

## 2017-07-27 RX ADMIN — HYDROXYCHLOROQUINE SULFATE 200 MG: 200 TABLET, FILM COATED ORAL at 22:04

## 2017-07-27 ASSESSMENT — ENCOUNTER SYMPTOMS
BLURRED VISION: 0
DIARRHEA: 0
MYALGIAS: 0
ORTHOPNEA: 0
HEMOPTYSIS: 0
VOMITING: 0
SEIZURES: 0
WEAKNESS: 1
PALPITATIONS: 0
EYES NEGATIVE: 1
SHORTNESS OF BREATH: 0
BACK PAIN: 1
NAUSEA: 1
ABDOMINAL PAIN: 0
DIZZINESS: 1
CHILLS: 0
WHEEZING: 0
FOCAL WEAKNESS: 0
DIZZINESS: 0
HEADACHES: 0
SENSORY CHANGE: 0
SORE THROAT: 0
HEARTBURN: 0
SPEECH CHANGE: 0
COUGH: 0
FEVER: 0
FOCAL WEAKNESS: 1
NAUSEA: 0

## 2017-07-27 ASSESSMENT — PAIN SCALES - GENERAL
PAINLEVEL_OUTOF10: 2
PAINLEVEL_OUTOF10: 9
PAINLEVEL_OUTOF10: 2
PAINLEVEL_OUTOF10: 9
PAINLEVEL_OUTOF10: 3
PAINLEVEL_OUTOF10: 8

## 2017-07-27 NOTE — PROGRESS NOTES
Shadia Manrique Fall Risk Assessment:     Last Known Fall: Within the last month  Mobility: Immobilized/requires assist of one person  Medications: Cardiovascular or central nervous system meds  Mental Status/LOC/Awareness: Lethargic/oriented to person only  Toileting Needs: No needs  Volume/Electrolyte Status: No problems  Communication/Sensory: Visual (Glasses)/hearing deficit  Behavior: Appropriate behavior  Shadia Manrique Fall Risk Total: 10  Fall Risk Level: LOW RISK    Universal Fall Precautions:  call light/belongings in reach, bed in low position and locked, wheelchairs and assistive devices out of sight, siderails up x 2, use non-slip footwear, adequate lighting, clutter free and spill free environment, educate on level of risk, educate to call for assistance    Fall Risk Level Interventions:   TRIAL ([employer], NEURO, MED GABBIE 5) Low Fall Risk Interventions  Place yellow fall risk ID band on patient: verified  Provide patient/family education based on risk assessment: verified  Educate patient/family to call staff for assistance when getting out of bed: verified  Place fall precaution signage outside patient door: verifiedTRIAL ([employer] 8, NEURO, MED GABBIE 5) Moderate Fall Risk Interventions  Place yellow fall risk ID band on patient: verified  Provide patient/family education based on risk assessment : verified  Educate patient/family to call staff for assistance when getting out of bed: verified  Place fall precaution signage outside patient door: verified  Utilize bed/chair fall alarm: verified     Patient Specific Interventions:     Medication: review medications with patient and family and limit combination of prn medications  Mental Status/LOC/Awareness: reorient patient, reinforce falls education, check on patient hourly, utilize bed/chair fall alarm and reinforce the use of call light  Toileting: provide frquent toileting, monitor intake and output/use of appropriate interventions, instruct patient/family  on the use of grab bars and instruct patient/family on the need to call for assistance when toileting  Volume/Electrolyte Status: administer medications as ordered for nausea and vomiting and monitor abnormal lab values  Communication/Sensory: update plan of care on whiteboard, ensure proper positioning when transferrng/ambulating and ensure patient has glasses/contacts and hearing aids/dentures  Behavioral: encourage patient to voice feelings, engage patient in daily activities and administer medication as ordered  Mobility: schedule physical activity throughout the day, provide comfort measures during transport, utilize bed/chair fall alarm, ensure bed is locked and in lowest position, provide appropriate assistive device and instruct patient to exit bed on their strongest side

## 2017-07-27 NOTE — CARE PLAN
Problem: Nutritional:  Goal: Achieve adequate nutritional intake  Patient will consume 50% of meals and supplements.   Outcome: PROGRESSING SLOWER THAN EXPECTED  Pt taking 25-50% of most meals  Encourage intake  document intake of all meals and supplements as % taken in ADL's to provide interdisciplinary communication across all shifts

## 2017-07-27 NOTE — CARE PLAN
Problem: Safety  Goal: Will remain free from falls  Outcome: PROGRESSING AS EXPECTED  Discussed bed alarm and call light with pt. Call light placed within reach of pt.    Problem: Venous Thromboembolism (VTW)/Deep Vein Thrombosis (DVT) Prevention:  Goal: Patient will participate in Venous Thrombosis (VTE)/Deep Vein Thrombosis (DVT)Prevention Measures  Outcome: PROGRESSING AS EXPECTED  Discussed importance of SCDs with pt, pt declines to wear them at this time.

## 2017-07-27 NOTE — CARE PLAN
Problem: Pain Management  Goal: Pain level will decrease to patient’s comfort goal  Outcome: PROGRESSING AS EXPECTED  Pt c/o pain of 2 on 0-10 pain scale. Pt medicated appropriately for pain.

## 2017-07-27 NOTE — PROGRESS NOTES
[facility] Progress Note    Date of Service: 2017    Chief Complaint  36 y.o. female admitted 2017 with Uremic Encephalopathy, Respiratory failure, pulmonary edema secondary to volume overload secondary to noncompliance with HD for ESRD with Lupus nephritis. Noted to have right leg weakness with chronic back pain.     Interval Problem Update  Enceph - more alert and oriented  Resp failure - off O2  Leg weakness - MRI pending  HTN - low side    Consultants/Specialty  Nephrology    Disposition  MRI pending, HD schedule per Nephrology        Review of Systems   Constitutional: Positive for malaise/fatigue. Negative for fever and chills.   HENT: Negative for sore throat.    Eyes: Negative for blurred vision.   Respiratory: Negative for cough, shortness of breath and wheezing.    Cardiovascular: Negative for chest pain.   Gastrointestinal: Positive for nausea. Negative for heartburn, vomiting, abdominal pain and diarrhea.   Musculoskeletal: Positive for back pain.   Neurological: Positive for dizziness, focal weakness and weakness. Negative for speech change, seizures and headaches.      Physical Exam  Laboratory/Imaging   Hemodynamics  Temp (24hrs), Av.8 °C (98.3 °F), Min:36.7 °C (98 °F), Max:37.1 °C (98.8 °F)   Temperature:  (pt is medical)  Pulse  Av  Min: 58  Max: 132   Blood Pressure:  (pt is medical)      Respiratory      Respiration:  (pt is medical), Pulse Oximetry:  (pt is medical)     Work Of Breathing / Effort: Mild  RUL Breath Sounds: Clear, RML Breath Sounds: Diminished, RLL Breath Sounds: Diminished, LASHANDA Breath Sounds: Clear, LLL Breath Sounds: Diminished    Fluids  No intake or output data in the 24 hours ending 17 1602    Nutrition  Orders Placed This Encounter   Procedures   • DIET ORDER     Standing Status: Standing      Number of Occurrences: 1      Standing Expiration Date:      Order Specific Question:  Diet:     Answer:  Renal [8]     Physical Exam   Constitutional:  She is oriented to person, place, and time. She appears well-developed and well-nourished.   HENT:   Head: Normocephalic and atraumatic.   Eyes: Conjunctivae are normal. Pupils are equal, round, and reactive to light.   Neck: No tracheal deviation present. No thyromegaly present.   Cardiovascular: Normal rate and regular rhythm.    Pulmonary/Chest: Effort normal and breath sounds normal.   Abdominal: Soft. Bowel sounds are normal. She exhibits no distension. There is no tenderness.   Lymphadenopathy:     She has no cervical adenopathy.   Neurological: She is alert and oriented to person, place, and time.   MMT BUE ad LLE 5/5, RLE 4+/5   Skin: Skin is warm and dry.   Nursing note and vitals reviewed.      Recent Labs      07/26/17   0710  07/27/17   0526   WBC  14.3*  20.7*   RBC  4.01*  3.66*   HEMOGLOBIN  12.5  11.3*   HEMATOCRIT  41.2  37.3   MCV  102.7*  101.9*   MCH  31.2  30.9   MCHC  30.3*  30.3*   RDW  55.8*  55.4*   PLATELETCT  270  251   MPV  10.4  10.7     Recent Labs      07/26/17   0539  07/27/17   0526   SODIUM  136  137   POTASSIUM  4.0  4.3   CHLORIDE  95*  98   CO2  27  26   GLUCOSE  115*  95   BUN  42*  32*   CREATININE  7.69*  6.41*   CALCIUM  9.6  9.5                      Assessment/Plan     Medical non-compliance (present on admission)  Assessment & Plan  Counseling    Uremic encephalopathy (present on admission)  Assessment & Plan  resolving    ESRD (end stage renal disease) (CMS-HCC) (present on admission)  Assessment & Plan  HD M-W-F    HTN (hypertension) (present on admission)  Assessment & Plan  Hold Norvasc, Coreg    Lupus (CMS-HCC) (present on admission)  Assessment & Plan  Plaquenil, Prednisone    Acute hypoxemic respiratory failure (CMS-HCC) (present on admission)  Assessment & Plan  Off O2     Iron deficiency  Assessment & Plan  FeSO4    Hypokalemia  Assessment & Plan  Follow bmp    Narcotic dependence (CMS-HCC) (present on admission)  Assessment & Plan  Judicious pain control    Anemia  (present on admission)  Assessment & Plan  transfused PRBC, follow cbc, Epogen?    Leg weakness (present on admission)  Assessment & Plan  for MRI L spine will need sedation    Low back pain (present on admission)  Assessment & Plan  Lyrica, Tizanidine    Depression  Assessment & Plan  Wellbutrin and Trazodone       Labs reviewed, Medications reviewed and Radiology images reviewed  Taylor catheter: No Taylor        DVT prophylaxis - mechanical: SCDs  Ulcer prophylaxis: Yes

## 2017-07-27 NOTE — PROGRESS NOTES
Assumed pt care at 1900. Received bedside report from RN. PM assessment completed. AAOx3. Pt denies SOB; c/o pain of 2 on 0 to 10 pain scale (Will administer medication PRN - see MAR). Provided pt with RN and CNA extension numbers on white board and encouraged pt to call when needed. Discussed plan of care for the night, pt verbalizes understanding. VSS. Denies any additional needs at this time. Call light, belongings, and phone within reach. Hourly rounding in effect.

## 2017-07-27 NOTE — PROGRESS NOTES
Nephrology Progress Note, Adult, Complex               Author: Leana Sivakumar Date & Time created: 7/27/2017  1:47 PM     Interval History:  36-year-old female with end-stage renal disease secondary to lupus nephritis, who did not come to the dialysis unit for over a month and came in with scott uremia. The patient had her first dialysis yesterday, low flow and tolerated it relatively well. Her mental status is improved. Apparently she was planning on switching to peritoneal dialysis. She was evaluated by the peritoneal dialysis unit, but was not deemed an appropriate candidate due to noncompliance.  Doing better tofay  AAO  HD MWF    Review of Systems:  Review of Systems   Constitutional: Positive for malaise/fatigue. Negative for fever and chills.   HENT: Negative.    Eyes: Negative.    Respiratory: Negative for cough, hemoptysis, shortness of breath and wheezing.    Cardiovascular: Negative for chest pain, palpitations, orthopnea and leg swelling.   Gastrointestinal: Negative for heartburn, nausea, vomiting and abdominal pain.   Musculoskeletal: Positive for back pain and joint pain. Negative for myalgias.   Skin: Negative.    Neurological: Positive for weakness. Negative for dizziness, sensory change and focal weakness.       Physical Exam:  Physical Exam   Constitutional: She appears well-developed and well-nourished. No distress.   HENT:   Head: Normocephalic and atraumatic.   Eyes: Conjunctivae and EOM are normal. Pupils are equal, round, and reactive to light.   Neck: Normal range of motion. Neck supple.   Cardiovascular: Normal rate and regular rhythm.  Exam reveals no gallop and no friction rub.    Pulmonary/Chest: Effort normal and breath sounds normal.   Abdominal: Soft. Bowel sounds are normal. She exhibits no distension. There is no tenderness.   Musculoskeletal: She exhibits no edema or tenderness.   Neurological: She is alert. No cranial nerve deficit.   Skin: Skin is warm and dry. No rash noted. No  erythema.       Labs:        Invalid input(s): [id number]      Recent Labs      17   0539  17   0526   SODIUM  136  137   POTASSIUM  4.0  4.3   CHLORIDE  95*  98   CO2    26   BUN  42*  32*   CREATININE  7.69*  6.41*   CALCIUM  9.6  9.5     Recent Labs      17   0539  17   0526   GLUCOSE  115*  95     Recent Labs      17   0710  17   0526   RBC  4.01*  3.66*   HEMOGLOBIN  12.5  11.3*   HEMATOCRIT  41.2  37.3   PLATELETCT  270  251     Recent Labs      17   0710  17   0526   WBC  14.3*  20.7*   NEUTSPOLYS  73.50*  67.20   LYMPHOCYTES  18.80*  15.50*   MONOCYTES  7.70  15.50*   EOSINOPHILS  0.00  0.00   BASOPHILS  0.00  0.90           Hemodynamics:  Temp (24hrs), Av.9 °C (98.5 °F), Min:36.7 °C (98 °F), Max:37.3 °C (99.1 °F)  Temperature:  (pt is medical)  Pulse  Av  Min: 58  Max: 132  Blood Pressure:  (pt is medical)     Respiratory:    Respiration:  (pt is medical), Pulse Oximetry:  (pt is medical)     Work Of Breathing / Effort: Mild  RUL Breath Sounds: Clear, RML Breath Sounds: Diminished, RLL Breath Sounds: Diminished, LASHANDA Breath Sounds: Clear, LLL Breath Sounds: Diminished  Fluids:  No intake or output data in the 24 hours ending 17 1347  Weight: 72.2 kg (159 lb 2.8 oz)  GI/Nutrition:  Orders Placed This Encounter   Procedures   • DIET ORDER     Standing Status: Standing      Number of Occurrences: 1      Standing Expiration Date:      Order Specific Question:  Diet:     Answer:  Renal [8]     Medical Decision Making, by Problem:  Active Hospital Problems    Diagnosis   • Sepsis (CMS-HCC) [A41.9]   • Uremic encephalopathy [G93.41, N19]   • Pneumonia [J18.9]   • Acute hypoxemic respiratory failure (CMS-HCC) [J96.01]   • Asterixis [R27.8]   • Anemia [D64.9]   • ESRD (end stage renal disease) (CMS-HCC) [N18.6]   • Leg weakness [R29.898]   • Low back pain [M54.5]   • HTN (hypertension) [I10]   • Lupus (CMS-HCC) [M32.9]   • Medical non-compliance  [Z91.19]   • Narcotic dependence (CMS-HCC) [F11.20]     1. End-stage renal disease, noncompliance/HD -continue MWF  2. Uremia due to noncompliance with dialysis  3. Uremic pericarditis  4. History of lupus nephritis  5. Anemia, Hgb better -on epogen     -HD tomorrow   -renal diet   -all meds to renal doses      Labs reviewed and Medications reviewed

## 2017-07-27 NOTE — PROGRESS NOTES
Shadia Manrique Fall Risk Assessment:     Last Known Fall: Within the last month  Mobility: Immobilized/requires assist of one person  Medications: Cardiovascular or central nervous system meds  Mental Status/LOC/Awareness: Lethargic/oriented to person only  Toileting Needs: No needs  Volume/Electrolyte Status: No problems  Communication/Sensory: Visual (Glasses)/hearing deficit, Neuropathy  Behavior: Depression/anxiety  Shadia Manrique Fall Risk Total: 11  Fall Risk Level: MODERATE RISK    Universal Fall Precautions:  call light/belongings in reach, bed in low position and locked, wheelchairs and assistive devices out of sight, siderails up x 2, use non-slip footwear, adequate lighting, clutter free and spill free environment, educate on level of risk, educate to call for assistance    Fall Risk Level Interventions:   TRIAL ([employer], NEURO, MED GABBIE 5) Low Fall Risk Interventions  Place yellow fall risk ID band on patient: completed  Provide patient/family education based on risk assessment: completed  Educate patient/family to call staff for assistance when getting out of bed: completed  Place fall precaution signage outside patient door: completedTRIAL ([employer], NEURO, MED GABBIE 5) Moderate Fall Risk Interventions  Place yellow fall risk ID band on patient: verified  Provide patient/family education based on risk assessment : verified  Educate patient/family to call staff for assistance when getting out of bed: verified  Place fall precaution signage outside patient door: verified  Utilize bed/chair fall alarm: verified     Patient Specific Interventions:     Medication: limit combination of prn medications  Mental Status/LOC/Awareness: utilize bed/chair fall alarm  Toileting: do not leave patient unattended in bathroom/refer to toileting scripting  Volume/Electrolyte Status: not applicable  Communication/Sensory: update plan of care on whiteboard  Behavioral: administer medication as ordered  Mobility: utilize  bed/chair fall alarm

## 2017-07-28 ENCOUNTER — APPOINTMENT (OUTPATIENT)
Dept: RADIOLOGY | Facility: MEDICAL CENTER | Age: 37
DRG: 871 | End: 2017-07-28
Attending: INTERNAL MEDICINE
Payer: MEDICARE

## 2017-07-28 LAB
ANION GAP SERPL CALC-SCNC: 11 MMOL/L (ref 0–11.9)
BASOPHILS # BLD AUTO: 0.4 % (ref 0–1.8)
BASOPHILS # BLD: 0.06 K/UL (ref 0–0.12)
BUN SERPL-MCNC: 50 MG/DL (ref 8–22)
CALCIUM SERPL-MCNC: 8.6 MG/DL (ref 8.5–10.5)
CHLORIDE SERPL-SCNC: 97 MMOL/L (ref 96–112)
CO2 SERPL-SCNC: 26 MMOL/L (ref 20–33)
CREAT SERPL-MCNC: 8.56 MG/DL (ref 0.5–1.4)
EOSINOPHIL # BLD AUTO: 0.03 K/UL (ref 0–0.51)
EOSINOPHIL NFR BLD: 0.2 % (ref 0–6.9)
ERYTHROCYTE [DISTWIDTH] IN BLOOD BY AUTOMATED COUNT: 52.9 FL (ref 35.9–50)
GFR SERPL CREATININE-BSD FRML MDRD: 5 ML/MIN/1.73 M 2
GLUCOSE SERPL-MCNC: 94 MG/DL (ref 65–99)
HCT VFR BLD AUTO: 29.8 % (ref 37–47)
HGB BLD-MCNC: 9.1 G/DL (ref 12–16)
IMM GRANULOCYTES # BLD AUTO: 0.49 K/UL (ref 0–0.11)
IMM GRANULOCYTES NFR BLD AUTO: 3.2 % (ref 0–0.9)
LYMPHOCYTES # BLD AUTO: 3.45 K/UL (ref 1–4.8)
LYMPHOCYTES NFR BLD: 22.4 % (ref 22–41)
MCH RBC QN AUTO: 31.2 PG (ref 27–33)
MCHC RBC AUTO-ENTMCNC: 30.5 G/DL (ref 33.6–35)
MCV RBC AUTO: 102.1 FL (ref 81.4–97.8)
MONOCYTES # BLD AUTO: 1.72 K/UL (ref 0–0.85)
MONOCYTES NFR BLD AUTO: 11.2 % (ref 0–13.4)
NEUTROPHILS # BLD AUTO: 9.67 K/UL (ref 2–7.15)
NEUTROPHILS NFR BLD: 62.6 % (ref 44–72)
NRBC # BLD AUTO: 0.12 K/UL
NRBC BLD AUTO-RTO: 0.8 /100 WBC
PLATELET # BLD AUTO: 172 K/UL (ref 164–446)
PMV BLD AUTO: 10.4 FL (ref 9–12.9)
POTASSIUM SERPL-SCNC: 4.4 MMOL/L (ref 3.6–5.5)
RBC # BLD AUTO: 2.92 M/UL (ref 4.2–5.4)
SODIUM SERPL-SCNC: 134 MMOL/L (ref 135–145)
WBC # BLD AUTO: 15.4 K/UL (ref 4.8–10.8)

## 2017-07-28 PROCEDURE — 85025 COMPLETE CBC W/AUTO DIFF WBC: CPT

## 2017-07-28 PROCEDURE — 700102 HCHG RX REV CODE 250 W/ 637 OVERRIDE(OP): Performed by: INTERNAL MEDICINE

## 2017-07-28 PROCEDURE — A9270 NON-COVERED ITEM OR SERVICE: HCPCS | Performed by: HOSPITALIST

## 2017-07-28 PROCEDURE — 700111 HCHG RX REV CODE 636 W/ 250 OVERRIDE (IP)

## 2017-07-28 PROCEDURE — 160002 HCHG RECOVERY MINUTES (STAT)

## 2017-07-28 PROCEDURE — 72148 MRI LUMBAR SPINE W/O DYE: CPT

## 2017-07-28 PROCEDURE — A9270 NON-COVERED ITEM OR SERVICE: HCPCS | Performed by: INTERNAL MEDICINE

## 2017-07-28 PROCEDURE — A9270 NON-COVERED ITEM OR SERVICE: HCPCS | Performed by: FAMILY MEDICINE

## 2017-07-28 PROCEDURE — 770006 HCHG ROOM/CARE - MED/SURG/GYN SEMI*

## 2017-07-28 PROCEDURE — 700111 HCHG RX REV CODE 636 W/ 250 OVERRIDE (IP): Performed by: INTERNAL MEDICINE

## 2017-07-28 PROCEDURE — 99232 SBSQ HOSP IP/OBS MODERATE 35: CPT | Performed by: INTERNAL MEDICINE

## 2017-07-28 PROCEDURE — 700101 HCHG RX REV CODE 250

## 2017-07-28 PROCEDURE — 90935 HEMODIALYSIS ONE EVALUATION: CPT

## 2017-07-28 PROCEDURE — 80048 BASIC METABOLIC PNL TOTAL CA: CPT

## 2017-07-28 PROCEDURE — 700102 HCHG RX REV CODE 250 W/ 637 OVERRIDE(OP): Performed by: HOSPITALIST

## 2017-07-28 PROCEDURE — 700102 HCHG RX REV CODE 250 W/ 637 OVERRIDE(OP): Performed by: FAMILY MEDICINE

## 2017-07-28 RX ORDER — VALACYCLOVIR HYDROCHLORIDE 500 MG/1
500 TABLET, FILM COATED ORAL 3 TIMES DAILY PRN
Status: DISCONTINUED | OUTPATIENT
Start: 2017-07-28 | End: 2017-08-02

## 2017-07-28 RX ORDER — HEPARIN SODIUM 1000 [USP'U]/ML
INJECTION, SOLUTION INTRAVENOUS; SUBCUTANEOUS
Status: COMPLETED
Start: 2017-07-28 | End: 2017-07-28

## 2017-07-28 RX ADMIN — VALACYCLOVIR 500 MG: 500 TABLET, FILM COATED ORAL at 17:26

## 2017-07-28 RX ADMIN — BUPROPION HYDROCHLORIDE 75 MG: 75 TABLET, FILM COATED ORAL at 21:54

## 2017-07-28 RX ADMIN — OXYCODONE HYDROCHLORIDE 20 MG: 10 TABLET ORAL at 14:42

## 2017-07-28 RX ADMIN — ONDANSETRON 4 MG: 2 INJECTION INTRAMUSCULAR; INTRAVENOUS at 22:25

## 2017-07-28 RX ADMIN — FAMOTIDINE 20 MG: 20 TABLET, FILM COATED ORAL at 07:39

## 2017-07-28 RX ADMIN — TRAZODONE HYDROCHLORIDE 50 MG: 50 TABLET ORAL at 21:55

## 2017-07-28 RX ADMIN — PREGABALIN 100 MG: 100 CAPSULE ORAL at 07:39

## 2017-07-28 RX ADMIN — Medication 325 MG: at 17:26

## 2017-07-28 RX ADMIN — OXYCODONE HYDROCHLORIDE 20 MG: 10 TABLET ORAL at 06:33

## 2017-07-28 RX ADMIN — HYDROXYCHLOROQUINE SULFATE 200 MG: 200 TABLET, FILM COATED ORAL at 22:19

## 2017-07-28 RX ADMIN — PREGABALIN 100 MG: 100 CAPSULE ORAL at 21:54

## 2017-07-28 RX ADMIN — Medication 325 MG: at 07:39

## 2017-07-28 RX ADMIN — TIZANIDINE 4 MG: 4 TABLET ORAL at 21:55

## 2017-07-28 RX ADMIN — HEPARIN SODIUM 2000 UNITS: 1000 INJECTION, SOLUTION INTRAVENOUS; SUBCUTANEOUS at 08:03

## 2017-07-28 RX ADMIN — BUPROPION HYDROCHLORIDE 75 MG: 75 TABLET, FILM COATED ORAL at 07:39

## 2017-07-28 RX ADMIN — ONDANSETRON 4 MG: 2 INJECTION INTRAMUSCULAR; INTRAVENOUS at 07:44

## 2017-07-28 RX ADMIN — OXYCODONE HYDROCHLORIDE 20 MG: 10 TABLET ORAL at 21:54

## 2017-07-28 RX ADMIN — PREGABALIN 100 MG: 100 CAPSULE ORAL at 14:38

## 2017-07-28 ASSESSMENT — ENCOUNTER SYMPTOMS
SENSORY CHANGE: 0
WHEEZING: 0
ORTHOPNEA: 0
SPEECH CHANGE: 0
BACK PAIN: 1
HEMOPTYSIS: 0
NAUSEA: 0
PALPITATIONS: 0
HEADACHES: 0
COUGH: 0
SHORTNESS OF BREATH: 0
CHILLS: 0
DOUBLE VISION: 0
NAUSEA: 1
ABDOMINAL PAIN: 0
SEIZURES: 0
DIZZINESS: 1
EYE PAIN: 0
FOCAL WEAKNESS: 1
VOMITING: 0
WEAKNESS: 1
DIARRHEA: 0
BLURRED VISION: 0
FEVER: 0
SORE THROAT: 0
FOCAL WEAKNESS: 0
EYES NEGATIVE: 1
HEARTBURN: 0
DIZZINESS: 0
MYALGIAS: 0

## 2017-07-28 ASSESSMENT — PAIN SCALES - GENERAL
PAINLEVEL_OUTOF10: 7
PAINLEVEL_OUTOF10: 8
PAINLEVEL_OUTOF10: 6
PAINLEVEL_OUTOF10: 8
PAINLEVEL_OUTOF10: 7

## 2017-07-28 NOTE — PROGRESS NOTES
"Pt arrived to unit at 2145 via wheelchair.  VSS.  Assessment complete. No signs of distress noted at this time.  Pt reports 9/10 back and flank pain, pain medication will be administered as per MAR. Pt is spastic and appears \"dazed\", but able to answer questions and converse appropriately. Fall precautions and appropriate signs in place, bed alarm in place.  Pt educated regarding fall precautions.  Pt oriented to unit routine, call light/phone system and RN extension number provided.  Pt denies any additional needs at this time.  Call light within reach. Will continue to monitor.        "

## 2017-07-28 NOTE — PROGRESS NOTES
Assumed care at 1900. Bedside report received from Pamela. Patient's chart and MAR reviewed. Pt denies pain at this time. Pt is A & O 4, can be confused in the evenings, per report. Pt is currently waiting for transport to medical floor.

## 2017-07-28 NOTE — CARE PLAN
Problem: Safety  Goal: Will remain free from falls  Intervention: Implement fall precautions    07/24/17 2030 07/27/17 2000 07/28/17 0020   OTHER   Environmental Precautions --  Treaded Slipper Socks on Patient;Personal Belongings, Wastebasket, Call Bell etc. in Easy Reach;Bed in Low Position --    IV Pole on Same Side of Bed as Bathroom (SL) --  --    Bedrails --  Bedrails Closest to Bathroom Down --    Chair/Bed Strip Alarm --  --  Patient Educated Regarding Fall Risk and Need for Bed Alarm, Understands and Continues to Refuse   Bed Alarm (Built in - for ICU ONLY) --  --  Yes - Alarm On  (pt refused strip alarm but agreed to built in bed alarm)           Problem: Psychosocial Needs:  Goal: Level of anxiety will decrease  Intervention: Identify and develop with patient strategies to cope with anxiety triggers  Lights turned low, noise minimized to relieve pt anxiety and promote rest. Pt able to rest comfortably.

## 2017-07-28 NOTE — PROGRESS NOTES
Nephrology Progress Note, Adult, Complex               Author: Leana Sivakumar Date & Time created: 7/28/2017  11:31 AM     Interval History:  36-year-old female with end-stage renal disease secondary to lupus nephritis, who did not come to the dialysis unit for over a month and came in with scott uremia. The patient had her first dialysis yesterday, low flow and tolerated it relatively well. Her mental status is improved. Apparently she was planning on switching to peritoneal dialysis. She was evaluated by the peritoneal dialysis unit, but was not deemed an appropriate candidate due to noncompliance.  Doing better.  AAO  HD MWF  Seen and examined during dialysis -tolerates well  Review of Systems:  Review of Systems   Constitutional: Positive for malaise/fatigue. Negative for fever and chills.   HENT: Negative.    Eyes: Negative.    Respiratory: Negative for cough, hemoptysis, shortness of breath and wheezing.    Cardiovascular: Negative for chest pain, palpitations, orthopnea and leg swelling.   Gastrointestinal: Negative for heartburn, nausea, vomiting and abdominal pain.   Musculoskeletal: Positive for back pain and joint pain. Negative for myalgias.   Skin: Negative.    Neurological: Positive for weakness. Negative for dizziness, sensory change and focal weakness.       Physical Exam:  Physical Exam   Constitutional: She appears well-developed and well-nourished. No distress.   HENT:   Head: Normocephalic and atraumatic.   Eyes: Conjunctivae and EOM are normal. Pupils are equal, round, and reactive to light.   Neck: Normal range of motion. Neck supple.   Cardiovascular: Normal rate and regular rhythm.  Exam reveals no gallop and no friction rub.    Pulmonary/Chest: Effort normal and breath sounds normal.   Abdominal: Soft. Bowel sounds are normal. She exhibits no distension. There is no tenderness.   Musculoskeletal: She exhibits no edema or tenderness.   Neurological: She is alert. No cranial nerve deficit.    Skin: Skin is warm and dry. No rash noted. No erythema.       Labs:        Invalid input(s): [id number]      Recent Labs      17   0539  17   SODIUM  136  137  134*   POTASSIUM  4.0  4.3  4.4   CHLORIDE  95*  98  97   CO2     BUN  42*  32*  50*   CREATININE  7.69*  6.41*  8.56*   CALCIUM  9.6  9.5  8.6     Recent Labs      17   0539  17   0517   GLUCOSE  115*  95  94     Recent Labs      17   0710  17   RBC  4.01*  3.66*  2.92*   HEMOGLOBIN  12.5  11.3*  9.1*   HEMATOCRIT  41.2  37.3  29.8*   PLATELETCT  270  251  172     Recent Labs      17   0710  17   WBC  14.3*  20.7*  15.4*   NEUTSPOLYS  73.50*  67.20  62.60   LYMPHOCYTES  18.80*  15.50*  22.40   MONOCYTES  7.70  15.50*  11.20   EOSINOPHILS  0.00  0.00  0.20   BASOPHILS  0.00  0.90  0.40           Hemodynamics:  Temp (24hrs), Av.3 °C (97.4 °F), Min:35.8 °C (96.4 °F), Max:37.2 °C (98.9 °F)  Temperature: (!) 35.8 °C (96.4 °F)  Pulse  Av.7  Min: 58  Max: 132  Blood Pressure: 113/44 mmHg     Respiratory:    Respiration: 18, Pulse Oximetry: 97 %        RUL Breath Sounds: Clear, RML Breath Sounds: Diminished, RLL Breath Sounds: Diminished, LASHANDA Breath Sounds: Clear, LLL Breath Sounds: Diminished  Fluids:    Intake/Output Summary (Last 24 hours) at 17 1131  Last data filed at 17 1103   Gross per 24 hour   Intake    980 ml   Output   2000 ml   Net  -1020 ml     Weight: 72.2 kg (159 lb 2.8 oz)  GI/Nutrition:  Orders Placed This Encounter   Procedures   • DIET ORDER     Standing Status: Standing      Number of Occurrences: 1      Standing Expiration Date:      Order Specific Question:  Diet:     Answer:  Renal [8]     Medical Decision Making, by Problem:  Active Hospital Problems    Diagnosis   • Sepsis (CMS-HCC) [A41.9]   • Uremic encephalopathy [G93.41, N19]   • Pneumonia [J18.9]   • Acute hypoxemic  respiratory failure (CMS-HCC) [J96.01]   • Asterixis [R27.8]   • Anemia [D64.9]   • ESRD (end stage renal disease) (CMS-HCC) [N18.6]   • Leg weakness [R29.898]   • Low back pain [M54.5]   • HTN (hypertension) [I10]   • Lupus (CMS-HCC) [M32.9]   • Medical non-compliance [Z91.19]   • Narcotic dependence (CMS-HCC) [F11.20]     1. End-stage renal disease, noncompliance/HD -seen and examined during dialysis -please see dialysis flow sheet for details  2. Uremia due to noncompliance with dialysis - better  3. Uremic pericarditis  4. History of lupus nephritis  5. Anemia; on epogen  6. Electrolytes controlled   -HD MWF   -renal diet   -all meds to renal doses   [facility]    Labs reviewed and Medications reviewed

## 2017-07-28 NOTE — PROGRESS NOTES
Pt walked to the bathroom by this RN. Pt legs are weak and needed a full one person assist. Pt convinced to have the bed alarm back on for safety. Pt agreed and verbalize understanding. Pt not impulsive and calls appropriately.

## 2017-07-28 NOTE — PROGRESS NOTES
Hemodialysis ordered by Dr. Shaver. Treatment started at 0803 and ended at 1103. Pt stable, vss, no c/o post tx. See flow sheets for details. Net UF 1.5 liters. Report to AZAR Wilkinson RN. Rt ua avg dressing clean, dry, intact. Pt to go to MRI after HD.

## 2017-07-28 NOTE — DISCHARGE PLANNING
Patient has [facility]. Referral has been sent, pending quant gold results and dialysis flow sheets. Will follow Monday for clinic acceptance.

## 2017-07-28 NOTE — PROGRESS NOTES
Pt received from MRI at 1338, report received from Anesthesia MD. Pt awake and alert, meets d/c criteria. Report called to primary RN, pt to go back to [id number]. Transport services dispatched.

## 2017-07-28 NOTE — PROGRESS NOTES
[facility] Hospitalist Progress Note    Date of Service: 2017    Chief Complaint  36 y.o. female admitted 2017 with Uremic Encephalopathy, Respiratory failure, pulmonary edema secondary to volume overload secondary to noncompliance with HD for ESRD with Lupus nephritis. Noted to have right leg weakness with chronic back pain.     Interval Problem Update  No acute issue overnight. Plan HD and MRI lumbar spine today.    Consultants/Specialty  Nephrology    Disposition  MRI pending, HD schedule per Nephrology        Review of Systems   Constitutional: Positive for malaise/fatigue. Negative for fever and chills.   HENT: Negative for sore throat.    Eyes: Negative for blurred vision, double vision and pain.   Respiratory: Negative for cough, shortness of breath and wheezing.    Cardiovascular: Negative for chest pain and palpitations.   Gastrointestinal: Positive for nausea. Negative for heartburn, vomiting and diarrhea.   Musculoskeletal: Positive for back pain.   Neurological: Positive for dizziness, focal weakness and weakness. Negative for speech change, seizures and headaches.      Physical Exam  Laboratory/Imaging   Hemodynamics  Temp (24hrs), Av.5 °C (97.7 °F), Min:36 °C (96.8 °F), Max:37.2 °C (98.9 °F)   Temperature: 36.2 °C (97.2 °F)  Pulse  Av  Min: 58  Max: 132   Blood Pressure: 101/57 mmHg      Respiratory      Respiration: 16, Pulse Oximetry: 97 %     Work Of Breathing / Effort: Mild  RUL Breath Sounds: Clear, RML Breath Sounds: Diminished, RLL Breath Sounds: Diminished, LASHANDA Breath Sounds: Clear, LLL Breath Sounds: Diminished    Fluids    Intake/Output Summary (Last 24 hours) at 17 0792  Last data filed at 17 2300   Gross per 24 hour   Intake    480 ml   Output      0 ml   Net    480 ml       Nutrition  Orders Placed This Encounter   Procedures   • DIET ORDER     Standing Status: Standing      Number of Occurrences: 1      Standing Expiration Date:      Order Specific Question:   Diet:     Answer:  Renal [8]     Physical Exam   Constitutional: She appears well-developed and well-nourished.   HENT:   Head: Normocephalic and atraumatic.   Eyes: Conjunctivae are normal. Pupils are equal, round, and reactive to light.   Neck: No tracheal deviation present. No thyromegaly present.   Cardiovascular: Normal rate and regular rhythm.    Pulmonary/Chest: Effort normal. No respiratory distress.   Abdominal: Soft. Bowel sounds are normal. She exhibits no distension.   Lymphadenopathy:     She has cervical adenopathy.   Neurological: She is alert.   MMT BUE ad LLE 5/5, RLE 4+/5   Skin: Skin is warm and dry.   Nursing note and vitals reviewed.      Recent Labs      07/26/17   0710  07/27/17 0526 07/28/17 0219   WBC  14.3*  20.7*  15.4*   RBC  4.01*  3.66*  2.92*   HEMOGLOBIN  12.5  11.3*  9.1*   HEMATOCRIT  41.2  37.3  29.8*   MCV  102.7*  101.9*  102.1*   MCH  31.2  30.9  31.2   MCHC  30.3*  30.3*  30.5*   RDW  55.8*  55.4*  52.9*   PLATELETCT  270  251  172   MPV  10.4  10.7  10.4     Recent Labs      07/26/17   0539  07/27/17 0526 07/28/17 0219   SODIUM  136  137  134*   POTASSIUM  4.0  4.3  4.4   CHLORIDE  95*  98  97   CO2  27  26  26   GLUCOSE  115*  95  94   BUN  42*  32*  50*   CREATININE  7.69*  6.41*  8.56*   CALCIUM  9.6  9.5  8.6                      Assessment/Plan     Medical non-compliance (present on admission)  Assessment & Plan  Counseling    Uremic encephalopathy (present on admission)  Assessment & Plan  resolving    ESRD (end stage renal disease) (CMS-HCC) (present on admission)  Assessment & Plan  HD M-W-F  HD today    HTN (hypertension) (present on admission)  Assessment & Plan  Hold Norvasc, Coreg    Lupus (CMS-HCC) (present on admission)  Assessment & Plan  Plaquenil, Prednisone    Acute hypoxemic respiratory failure (CMS-HCC) (present on admission)  Assessment & Plan  Off O2     Iron deficiency  Assessment & Plan  FeSO4    Hypokalemia  Assessment & Plan  Follow  bmp    Narcotic dependence (CMS-HCC) (present on admission)  Assessment & Plan  Judicious pain control    Anemia (present on admission)  Assessment & Plan  transfused PRBC, follow cbc, Epogen?    Leg weakness (present on admission)  Assessment & Plan  MRI spine: pending    Low back pain (present on admission)  Assessment & Plan  Lyrica, Tizanidine    Depression  Assessment & Plan  Wellbutrin and Trazodone       Labs reviewed, Medications reviewed and Radiology images reviewed  Taylor catheter: No Taylor        DVT prophylaxis - mechanical: SCDs  Ulcer prophylaxis: Yes

## 2017-07-28 NOTE — PROGRESS NOTES
Pt transported to S520 via wheelchair with all belongings.  Pt had Rx oxycodone with her.  This was packaged up and sent to pharmacy.  Will walk pharmacy slip to pts nurse on Ly 5.

## 2017-07-29 LAB
ANION GAP SERPL CALC-SCNC: 11 MMOL/L (ref 0–11.9)
BASOPHILS # BLD AUTO: 0.6 % (ref 0–1.8)
BASOPHILS # BLD: 0.06 K/UL (ref 0–0.12)
BUN SERPL-MCNC: 36 MG/DL (ref 8–22)
CALCIUM SERPL-MCNC: 8.6 MG/DL (ref 8.5–10.5)
CHLORIDE SERPL-SCNC: 97 MMOL/L (ref 96–112)
CO2 SERPL-SCNC: 26 MMOL/L (ref 20–33)
CREAT SERPL-MCNC: 6.15 MG/DL (ref 0.5–1.4)
EOSINOPHIL # BLD AUTO: 0.19 K/UL (ref 0–0.51)
EOSINOPHIL NFR BLD: 1.9 % (ref 0–6.9)
ERYTHROCYTE [DISTWIDTH] IN BLOOD BY AUTOMATED COUNT: 53.4 FL (ref 35.9–50)
GFR SERPL CREATININE-BSD FRML MDRD: 8 ML/MIN/1.73 M 2
GLUCOSE SERPL-MCNC: 96 MG/DL (ref 65–99)
HCT VFR BLD AUTO: 30.5 % (ref 37–47)
HGB BLD-MCNC: 9.4 G/DL (ref 12–16)
IMM GRANULOCYTES # BLD AUTO: 0.4 K/UL (ref 0–0.11)
IMM GRANULOCYTES NFR BLD AUTO: 3.9 % (ref 0–0.9)
LYMPHOCYTES # BLD AUTO: 2.77 K/UL (ref 1–4.8)
LYMPHOCYTES NFR BLD: 27.2 % (ref 22–41)
MCH RBC QN AUTO: 31.2 PG (ref 27–33)
MCHC RBC AUTO-ENTMCNC: 30.8 G/DL (ref 33.6–35)
MCV RBC AUTO: 101.3 FL (ref 81.4–97.8)
MONOCYTES # BLD AUTO: 1.1 K/UL (ref 0–0.85)
MONOCYTES NFR BLD AUTO: 10.8 % (ref 0–13.4)
NEUTROPHILS # BLD AUTO: 5.65 K/UL (ref 2–7.15)
NEUTROPHILS NFR BLD: 55.6 % (ref 44–72)
NRBC # BLD AUTO: 0.02 K/UL
NRBC BLD AUTO-RTO: 0.2 /100 WBC
PLATELET # BLD AUTO: 166 K/UL (ref 164–446)
PMV BLD AUTO: 11 FL (ref 9–12.9)
POTASSIUM SERPL-SCNC: 4.3 MMOL/L (ref 3.6–5.5)
RBC # BLD AUTO: 3.01 M/UL (ref 4.2–5.4)
SODIUM SERPL-SCNC: 134 MMOL/L (ref 135–145)
WBC # BLD AUTO: 10.2 K/UL (ref 4.8–10.8)

## 2017-07-29 PROCEDURE — 770006 HCHG ROOM/CARE - MED/SURG/GYN SEMI*

## 2017-07-29 PROCEDURE — 700102 HCHG RX REV CODE 250 W/ 637 OVERRIDE(OP): Performed by: FAMILY MEDICINE

## 2017-07-29 PROCEDURE — 700102 HCHG RX REV CODE 250 W/ 637 OVERRIDE(OP): Performed by: HOSPITALIST

## 2017-07-29 PROCEDURE — 700111 HCHG RX REV CODE 636 W/ 250 OVERRIDE (IP): Performed by: INTERNAL MEDICINE

## 2017-07-29 PROCEDURE — 80048 BASIC METABOLIC PNL TOTAL CA: CPT

## 2017-07-29 PROCEDURE — A9270 NON-COVERED ITEM OR SERVICE: HCPCS | Performed by: FAMILY MEDICINE

## 2017-07-29 PROCEDURE — 700102 HCHG RX REV CODE 250 W/ 637 OVERRIDE(OP): Performed by: INTERNAL MEDICINE

## 2017-07-29 PROCEDURE — 306581 SET INFUSION,POWERLOC 20G X 1: Performed by: INTERNAL MEDICINE

## 2017-07-29 PROCEDURE — A9270 NON-COVERED ITEM OR SERVICE: HCPCS | Performed by: INTERNAL MEDICINE

## 2017-07-29 PROCEDURE — 99232 SBSQ HOSP IP/OBS MODERATE 35: CPT | Performed by: INTERNAL MEDICINE

## 2017-07-29 PROCEDURE — A9270 NON-COVERED ITEM OR SERVICE: HCPCS | Performed by: HOSPITALIST

## 2017-07-29 PROCEDURE — 85025 COMPLETE CBC W/AUTO DIFF WBC: CPT

## 2017-07-29 RX ADMIN — FAMOTIDINE 20 MG: 20 TABLET, FILM COATED ORAL at 08:09

## 2017-07-29 RX ADMIN — VALACYCLOVIR 500 MG: 500 TABLET, FILM COATED ORAL at 04:44

## 2017-07-29 RX ADMIN — Medication 325 MG: at 16:33

## 2017-07-29 RX ADMIN — PREGABALIN 100 MG: 100 CAPSULE ORAL at 14:41

## 2017-07-29 RX ADMIN — OXYCODONE HYDROCHLORIDE 20 MG: 10 TABLET ORAL at 10:27

## 2017-07-29 RX ADMIN — Medication 325 MG: at 08:09

## 2017-07-29 RX ADMIN — PREGABALIN 100 MG: 100 CAPSULE ORAL at 08:09

## 2017-07-29 RX ADMIN — OXYCODONE HYDROCHLORIDE 20 MG: 10 TABLET ORAL at 04:38

## 2017-07-29 RX ADMIN — BUPROPION HYDROCHLORIDE 75 MG: 75 TABLET, FILM COATED ORAL at 20:27

## 2017-07-29 RX ADMIN — TRAZODONE HYDROCHLORIDE 50 MG: 50 TABLET ORAL at 20:27

## 2017-07-29 RX ADMIN — PROMETHAZINE HYDROCHLORIDE 25 MG: 25 TABLET ORAL at 04:49

## 2017-07-29 RX ADMIN — TIZANIDINE 4 MG: 4 TABLET ORAL at 20:27

## 2017-07-29 RX ADMIN — HEPARIN 500 UNITS: 100 SYRINGE at 10:22

## 2017-07-29 RX ADMIN — PREGABALIN 100 MG: 100 CAPSULE ORAL at 20:27

## 2017-07-29 RX ADMIN — PROMETHAZINE HYDROCHLORIDE 25 MG: 25 TABLET ORAL at 20:31

## 2017-07-29 RX ADMIN — HYDROXYCHLOROQUINE SULFATE 200 MG: 200 TABLET, FILM COATED ORAL at 20:27

## 2017-07-29 RX ADMIN — OXYCODONE HYDROCHLORIDE 20 MG: 10 TABLET ORAL at 23:06

## 2017-07-29 RX ADMIN — OXYCODONE HYDROCHLORIDE 20 MG: 10 TABLET ORAL at 16:33

## 2017-07-29 ASSESSMENT — ENCOUNTER SYMPTOMS
FOCAL WEAKNESS: 0
HEADACHES: 0
PALPITATIONS: 0
HEMOPTYSIS: 0
FEVER: 0
WHEEZING: 0
BLURRED VISION: 0
FOCAL WEAKNESS: 1
EYE PAIN: 0
SENSORY CHANGE: 0
NAUSEA: 1
WEAKNESS: 1
COUGH: 0
SEIZURES: 0
BACK PAIN: 1
VOMITING: 0
SORE THROAT: 0
NAUSEA: 0
ORTHOPNEA: 0
DIZZINESS: 0
HEARTBURN: 0
SPEECH CHANGE: 0
CHILLS: 0
SHORTNESS OF BREATH: 0
DIZZINESS: 1
ABDOMINAL PAIN: 0
MYALGIAS: 0
EYES NEGATIVE: 1

## 2017-07-29 ASSESSMENT — PAIN SCALES - GENERAL
PAINLEVEL_OUTOF10: 9
PAINLEVEL_OUTOF10: 7
PAINLEVEL_OUTOF10: 9
PAINLEVEL_OUTOF10: 6
PAINLEVEL_OUTOF10: 7

## 2017-07-29 NOTE — PROGRESS NOTES
Shadia Manrique Fall Risk Assessment:     Last Known Fall: Within the last month  Mobility: Use of assistive device/requires assist of two people  Medications: Cardiovascular or central nervous system meds  Mental Status/LOC/Awareness: Awake, alert, and oriented to date, place, and person  Toileting Needs: Use of assistive device (Bedside commode, bedpan, urinal)  Volume/Electrolyte Status: No problems  Communication/Sensory: Visual (Glasses)/hearing deficit  Behavior: Appropriate behavior  Shadia Manrique Fall Risk Total: 11  Fall Risk Level: MODERATE RISK    Universal Fall Precautions:  call light/belongings in reach, bed in low position and locked, wheelchairs and assistive devices out of sight, siderails up x 2, use non-slip footwear, adequate lighting, clutter free and spill free environment, educate on level of risk, educate to call for assistance    Fall Risk Level Interventions:   TRIAL ([employer], NEURO, MED GABBIE 5) Low Fall Risk Interventions  Place yellow fall risk ID band on patient: verified  Provide patient/family education based on risk assessment: verified  Educate patient/family to call staff for assistance when getting out of bed: verified  Place fall precaution signage outside patient door: verifiedTRIAL ([employer], NEURO, MED GABBIE 5) Moderate Fall Risk Interventions  Place yellow fall risk ID band on patient: verified  Provide patient/family education based on risk assessment : completed  Educate patient/family to call staff for assistance when getting out of bed: completed  Place fall precaution signage outside patient door: completed  Utilize bed/chair fall alarm: refused     Patient Specific Interventions:     Medication: review medications with patient and family, assess for medications that can be discontinued or dosage decreased and limit combination of prn medications  Mental Status/LOC/Awareness: reinforce falls education, check on patient hourly and reinforce the use of call light  Toileting:  provide frquent toileting and do not leave patient unattended in bathroom/refer to toileting scripting  Volume/Electrolyte Status: ensure patient remains hydrated and ensure IV fluids are appropriate  Communication/Sensory: update plan of care on whiteboard, provide communication alternatives/ and ensure patient has glasses/contacts and hearing aids/dentures  Behavioral: encourage patient to voice feelings, engage patient in daily activities, administer medication as ordered and instruct/reinforce fall program rationale  Mobility: ensure bed is locked and in lowest position, provide appropriate assistive device and instruct patient to exit bed on their strongest side

## 2017-07-29 NOTE — PROGRESS NOTES
Nephrology Progress Note, Adult, Complex               Author: Leana Sivakumar Date & Time created: 7/29/2017  12:25 PM     Interval History:  36-year-old female with end-stage renal disease secondary to lupus nephritis, who did not come to the dialysis unit for over a month and came in with scott uremia. The patient had her first dialysis yesterday, low flow and tolerated it relatively well. Her mental status is improved. Apparently she was planning on switching to peritoneal dialysis. She was evaluated by the peritoneal dialysis unit, but was not deemed an appropriate candidate due to noncompliance.  Doing better.  No acute events.  HD MWF    Review of Systems:  Review of Systems   Constitutional: Positive for malaise/fatigue. Negative for fever and chills.   HENT: Negative.    Eyes: Negative.    Respiratory: Negative for cough, hemoptysis, shortness of breath and wheezing.    Cardiovascular: Negative for chest pain, palpitations, orthopnea and leg swelling.   Gastrointestinal: Negative for heartburn, nausea, vomiting and abdominal pain.   Musculoskeletal: Positive for back pain and joint pain. Negative for myalgias.   Skin: Negative.    Neurological: Positive for weakness. Negative for dizziness, sensory change and focal weakness.       Physical Exam:  Physical Exam   Constitutional: She appears well-developed and well-nourished. No distress.   HENT:   Head: Normocephalic and atraumatic.   Eyes: Conjunctivae and EOM are normal. Pupils are equal, round, and reactive to light.   Neck: Normal range of motion. Neck supple.   Cardiovascular: Normal rate and regular rhythm.  Exam reveals no gallop and no friction rub.    Pulmonary/Chest: Effort normal and breath sounds normal.   Abdominal: Soft. Bowel sounds are normal. She exhibits no distension. There is no tenderness.   Musculoskeletal: She exhibits no edema or tenderness.   Neurological: She is alert. No cranial nerve deficit.   Skin: Skin is warm and dry. No rash  noted. No erythema.       Labs:        Invalid input(s): [id number]      Recent Labs      17   0435   SODIUM  137  134*  134*   POTASSIUM  4.3  4.4  4.3   CHLORIDE  98  97  97   CO2  26  26  26   BUN  32*  50*  36*   CREATININE  6.41*  8.56*  6.15*   CALCIUM  9.5  8.6  8.6     Recent Labs      17   0435   GLUCOSE  95  94  96     Recent Labs      17   0435   RBC  3.66*  2.92*  3.01*   HEMOGLOBIN  11.3*  9.1*  9.4*   HEMATOCRIT  37.3  29.8*  30.5*   PLATELETCT  251  172  166     Recent Labs      17   0435   WBC  20.7*  15.4*  10.2   NEUTSPOLYS  67.20  62.60  55.60   LYMPHOCYTES  15.50*  22.40  27.20   MONOCYTES  15.50*  11.20  10.80   EOSINOPHILS  0.00  0.20  1.90   BASOPHILS  0.90  0.40  0.60           Hemodynamics:  Temp (24hrs), Av.6 °C (97.8 °F), Min:36.2 °C (97.1 °F), Max:36.7 °C (98 °F)  Temperature: 36.6 °C (97.8 °F)  Pulse  Av.2  Min: 58  Max: 132  Blood Pressure: 120/40 mmHg     Respiratory:    Respiration: 18, Pulse Oximetry: 94 %     Work Of Breathing / Effort: Mild  RUL Breath Sounds: Clear, RML Breath Sounds: Diminished, RLL Breath Sounds: Diminished, LASHANDA Breath Sounds: Clear, LLL Breath Sounds: Diminished  Fluids:  No intake or output data in the 24 hours ending 17 1225  Weight: 77 kg (169 lb 12.1 oz)  GI/Nutrition:  Orders Placed This Encounter   Procedures   • DIET ORDER     Standing Status: Standing      Number of Occurrences: 1      Standing Expiration Date:      Order Specific Question:  Diet:     Answer:  Renal [8]     Medical Decision Making, by Problem:  Active Hospital Problems    Diagnosis   • Sepsis (CMS-HCC) [A41.9]   • Uremic encephalopathy [G93.41, N19]   • Pneumonia [J18.9]   • Acute hypoxemic respiratory failure (CMS-HCC) [J96.01]   • Asterixis [R27.8]   • Anemia [D64.9]   • ESRD (end stage renal disease)  (CMS-[facility]) [N18.6]   • Leg weakness [R29.898]   • Low back pain [M54.5]   • HTN (hypertension) [I10]   • Lupus (CMS-[facility]) [M32.9]   • Medical non-compliance [Z91.19]   • Narcotic dependence (CMS-[facility]) [F11.20]     1. End-stage renal disease/HD -MWF  2. Uremia due to noncompliance with dialysis - better  3. Uremic pericarditis  4. History of lupus nephritis  5. Anemia; on epogen  6. Electrolytes controlled   -HD MWF   -renal diet   -all meds to renal doses   -needs outpt chair - likely in [facility]    Labs reviewed and Medications reviewed

## 2017-07-29 NOTE — PROGRESS NOTES
Received bedside report from day-shift RN and assumed care of patient. Labs and orders noted. Assessment performed. Patient is alert and oriented x4 with no signs of labored breathing or distress. Patient denies any dizziness or chest pain at this time. Patient updated on plan of care; verbalizes understanding and states all of her questions/concerns have been addressed and answered appropriately. Patient states all of her needs are being met at this time. Safety precautions in place including patient call light within reach, personal possessions nearby, bed in low position and locked, and patient rounding in practice.

## 2017-07-29 NOTE — PROGRESS NOTES
[facility] Hospitalist Progress Note    Date of Service: 2017    Chief Complaint  36 y.o. female admitted 2017 with Uremic Encephalopathy, Respiratory failure, pulmonary edema secondary to volume overload secondary to noncompliance with HD for ESRD with Lupus nephritis. Noted to have right leg weakness with chronic back pain.     Interval Problem Update  Had another long discussion with the patient regarding pain medication. She keeps asking for pain medication to control her pain.    Consultants/Specialty  Nephrology    Disposition  MRI pending, HD schedule per Nephrology        Review of Systems   Constitutional: Positive for malaise/fatigue. Negative for fever and chills.   HENT: Negative for sore throat.    Eyes: Negative for blurred vision and pain.   Respiratory: Negative for cough and wheezing.    Cardiovascular: Negative for chest pain.   Gastrointestinal: Positive for nausea. Negative for heartburn and vomiting.   Musculoskeletal: Positive for back pain.   Neurological: Positive for dizziness, focal weakness and weakness. Negative for speech change, seizures and headaches.      Physical Exam  Laboratory/Imaging   Hemodynamics  Temp (24hrs), Av.4 °C (97.5 °F), Min:35.8 °C (96.4 °F), Max:36.7 °C (98 °F)   Temperature: 36.7 °C (98 °F)  Pulse  Av.3  Min: 58  Max: 132   Blood Pressure: 105/67 mmHg      Respiratory      Respiration: 18, Pulse Oximetry: 94 %     Work Of Breathing / Effort: Mild  RUL Breath Sounds: Clear, RML Breath Sounds: Diminished, RLL Breath Sounds: Diminished, LASHANDA Breath Sounds: Clear, LLL Breath Sounds: Diminished    Fluids    Intake/Output Summary (Last 24 hours) at 17 0727  Last data filed at 17 1103   Gross per 24 hour   Intake    500 ml   Output   2000 ml   Net  -1500 ml       Nutrition  Orders Placed This Encounter   Procedures   • DIET ORDER     Standing Status: Standing      Number of Occurrences: 1      Standing Expiration Date:      Order Specific  Question:  Diet:     Answer:  Renal [8]     Physical Exam   Constitutional: She appears well-developed and well-nourished.   HENT:   Head: Normocephalic and atraumatic.   Eyes: Pupils are equal, round, and reactive to light.   Neck: No thyromegaly present.   Cardiovascular: Normal rate and regular rhythm.    Pulmonary/Chest: Effort normal. No respiratory distress.   Abdominal: Soft. She exhibits no distension.   Lymphadenopathy:     She has cervical adenopathy.   Neurological: She is alert.   MMT BUE ad LLE 5/5, RLE 4+/5   Skin: Skin is warm and dry.   Nursing note and vitals reviewed.      Recent Labs      07/27/17 0526 07/28/17 0219 07/29/17   0435   WBC  20.7*  15.4*  10.2   RBC  3.66*  2.92*  3.01*   HEMOGLOBIN  11.3*  9.1*  9.4*   HEMATOCRIT  37.3  29.8*  30.5*   MCV  101.9*  102.1*  101.3*   MCH  30.9  31.2  31.2   MCHC  30.3*  30.5*  30.8*   RDW  55.4*  52.9*  53.4*   PLATELETCT  251  172  166   MPV  10.7  10.4  11.0     Recent Labs      07/27/17 0526 07/28/17 0219 07/29/17   0435   SODIUM  137  134*  134*   POTASSIUM  4.3  4.4  4.3   CHLORIDE  98  97  97   CO2  26  26  26   GLUCOSE  95  94  96   BUN  32*  50*  36*   CREATININE  6.41*  8.56*  6.15*   CALCIUM  9.5  8.6  8.6                      Assessment/Plan     Medical non-compliance (present on admission)  Assessment & Plan  Counseling    Uremic encephalopathy (present on admission)  Assessment & Plan  resolving    ESRD (end stage renal disease) (CMS-[facility]) (present on admission)  Assessment & Plan  HD M-W-F  Awaiting for HD chair at [facility]    HTN (hypertension) (present on admission)  Assessment & Plan  Hold Norvasc, Coreg    Lupus (CMS-[facility]) (present on admission)  Assessment & Plan  Plaquenil, Prednisone    Acute hypoxemic respiratory failure (CMS-[facility]) (present on admission)  Assessment & Plan  Off O2     Iron deficiency  Assessment & Plan  FeSO4    Hypokalemia  Assessment & Plan  Follow bmp    Narcotic dependence (CMS-HCC) (present on  admission)  Assessment & Plan  Only outpatient pain meds    Anemia (present on admission)  Assessment & Plan  transfused PRBC, follow cbc, Epogen?    Leg weakness (present on admission)  Assessment & Plan  MRI spine: no acute finding  PT/OT    Low back pain (present on admission)  Assessment & Plan  Lyrica, Tizanidine    Depression  Assessment & Plan  Wellbutrin and Trazodone       Labs reviewed, Medications reviewed and Radiology images reviewed  Taylor catheter: No Taylor        DVT prophylaxis - mechanical: SCDs  Ulcer prophylaxis: Yes

## 2017-07-29 NOTE — CARE PLAN
Problem: Safety  Goal: Will remain free from falls  Outcome: PROGRESSING AS EXPECTED  Patient standby assist. Tolerates ambulation well. Patient calls for assistance appropriately. Safety precautions in place including patient call light within reach, bed in low position and locked, personal possessions close by, and patient rounding in practice. Patient refusing bed alarm. Two RN education given. Patient remains free of injury since start of shift.    Problem: Respiratory:  Goal: Respiratory status will improve  Outcome: PROGRESSING AS EXPECTED  Patient on RA throughout the day. However, being placed on 2-3L O2 via silicone nasal cannula at night d/t desating per day RN report.  placed during NOC assessment. Patient placed on 2L via cannula for O2 sats at 88%. Patient denies feeling SOB or dizziness. All needs being met at this time.

## 2017-07-30 LAB
ANION GAP SERPL CALC-SCNC: 13 MMOL/L (ref 0–11.9)
BASOPHILS # BLD AUTO: 0.6 % (ref 0–1.8)
BASOPHILS # BLD: 0.05 K/UL (ref 0–0.12)
BUN SERPL-MCNC: 49 MG/DL (ref 8–22)
CALCIUM SERPL-MCNC: 8.5 MG/DL (ref 8.5–10.5)
CHLORIDE SERPL-SCNC: 97 MMOL/L (ref 96–112)
CO2 SERPL-SCNC: 24 MMOL/L (ref 20–33)
CREAT SERPL-MCNC: 8.91 MG/DL (ref 0.5–1.4)
EOSINOPHIL # BLD AUTO: 0.19 K/UL (ref 0–0.51)
EOSINOPHIL NFR BLD: 2.4 % (ref 0–6.9)
ERYTHROCYTE [DISTWIDTH] IN BLOOD BY AUTOMATED COUNT: 52.9 FL (ref 35.9–50)
GFR SERPL CREATININE-BSD FRML MDRD: 5 ML/MIN/1.73 M 2
GLUCOSE BLD-MCNC: 90 MG/DL (ref 65–99)
GLUCOSE SERPL-MCNC: 86 MG/DL (ref 65–99)
HCT VFR BLD AUTO: 30.1 % (ref 37–47)
HGB BLD-MCNC: 9.3 G/DL (ref 12–16)
IMM GRANULOCYTES # BLD AUTO: 0.31 K/UL (ref 0–0.11)
IMM GRANULOCYTES NFR BLD AUTO: 3.9 % (ref 0–0.9)
LYMPHOCYTES # BLD AUTO: 2.4 K/UL (ref 1–4.8)
LYMPHOCYTES NFR BLD: 30.2 % (ref 22–41)
MCH RBC QN AUTO: 31.4 PG (ref 27–33)
MCHC RBC AUTO-ENTMCNC: 30.9 G/DL (ref 33.6–35)
MCV RBC AUTO: 101.7 FL (ref 81.4–97.8)
MONOCYTES # BLD AUTO: 1.17 K/UL (ref 0–0.85)
MONOCYTES NFR BLD AUTO: 14.7 % (ref 0–13.4)
NEUTROPHILS # BLD AUTO: 3.84 K/UL (ref 2–7.15)
NEUTROPHILS NFR BLD: 48.2 % (ref 44–72)
NRBC # BLD AUTO: 0.02 K/UL
NRBC BLD AUTO-RTO: 0.3 /100 WBC
PLATELET # BLD AUTO: 141 K/UL (ref 164–446)
PMV BLD AUTO: 11 FL (ref 9–12.9)
POTASSIUM SERPL-SCNC: 4.3 MMOL/L (ref 3.6–5.5)
RBC # BLD AUTO: 2.96 M/UL (ref 4.2–5.4)
SODIUM SERPL-SCNC: 134 MMOL/L (ref 135–145)
WBC # BLD AUTO: 8 K/UL (ref 4.8–10.8)

## 2017-07-30 PROCEDURE — 82962 GLUCOSE BLOOD TEST: CPT

## 2017-07-30 PROCEDURE — 700102 HCHG RX REV CODE 250 W/ 637 OVERRIDE(OP): Performed by: INTERNAL MEDICINE

## 2017-07-30 PROCEDURE — 99232 SBSQ HOSP IP/OBS MODERATE 35: CPT | Performed by: INTERNAL MEDICINE

## 2017-07-30 PROCEDURE — 80048 BASIC METABOLIC PNL TOTAL CA: CPT

## 2017-07-30 PROCEDURE — 85025 COMPLETE CBC W/AUTO DIFF WBC: CPT

## 2017-07-30 PROCEDURE — 700102 HCHG RX REV CODE 250 W/ 637 OVERRIDE(OP): Performed by: FAMILY MEDICINE

## 2017-07-30 PROCEDURE — A9270 NON-COVERED ITEM OR SERVICE: HCPCS | Performed by: HOSPITALIST

## 2017-07-30 PROCEDURE — A9270 NON-COVERED ITEM OR SERVICE: HCPCS | Performed by: FAMILY MEDICINE

## 2017-07-30 PROCEDURE — 700102 HCHG RX REV CODE 250 W/ 637 OVERRIDE(OP): Performed by: HOSPITALIST

## 2017-07-30 PROCEDURE — 770006 HCHG ROOM/CARE - MED/SURG/GYN SEMI*

## 2017-07-30 PROCEDURE — A9270 NON-COVERED ITEM OR SERVICE: HCPCS | Performed by: INTERNAL MEDICINE

## 2017-07-30 RX ADMIN — TRAZODONE HYDROCHLORIDE 50 MG: 50 TABLET ORAL at 20:49

## 2017-07-30 RX ADMIN — BUPROPION HYDROCHLORIDE 75 MG: 75 TABLET, FILM COATED ORAL at 08:19

## 2017-07-30 RX ADMIN — PREGABALIN 100 MG: 100 CAPSULE ORAL at 20:49

## 2017-07-30 RX ADMIN — FAMOTIDINE 20 MG: 20 TABLET, FILM COATED ORAL at 08:19

## 2017-07-30 RX ADMIN — PREGABALIN 100 MG: 100 CAPSULE ORAL at 08:19

## 2017-07-30 RX ADMIN — Medication 325 MG: at 08:19

## 2017-07-30 RX ADMIN — BUPROPION HYDROCHLORIDE 75 MG: 75 TABLET, FILM COATED ORAL at 20:49

## 2017-07-30 RX ADMIN — OXYCODONE HYDROCHLORIDE 20 MG: 10 TABLET ORAL at 17:59

## 2017-07-30 RX ADMIN — OXYCODONE HYDROCHLORIDE 20 MG: 10 TABLET ORAL at 08:40

## 2017-07-30 RX ADMIN — PREGABALIN 100 MG: 100 CAPSULE ORAL at 14:07

## 2017-07-30 RX ADMIN — TIZANIDINE 4 MG: 4 TABLET ORAL at 20:49

## 2017-07-30 RX ADMIN — HYDROXYCHLOROQUINE SULFATE 200 MG: 200 TABLET, FILM COATED ORAL at 20:49

## 2017-07-30 RX ADMIN — Medication 325 MG: at 17:45

## 2017-07-30 ASSESSMENT — PAIN SCALES - GENERAL
PAINLEVEL_OUTOF10: 6
PAINLEVEL_OUTOF10: 7
PAINLEVEL_OUTOF10: 6

## 2017-07-30 ASSESSMENT — ENCOUNTER SYMPTOMS
EYES NEGATIVE: 1
VOMITING: 0
PALPITATIONS: 0
FOCAL WEAKNESS: 1
BACK PAIN: 1
SENSORY CHANGE: 0
DIZZINESS: 1
MYALGIAS: 0
NAUSEA: 1
WHEEZING: 0
NAUSEA: 0
DIZZINESS: 0
ORTHOPNEA: 0
HEMOPTYSIS: 0
HEARTBURN: 0
CHILLS: 0
ABDOMINAL PAIN: 0
SHORTNESS OF BREATH: 0
SPEECH CHANGE: 0
BLURRED VISION: 0
WEAKNESS: 1
FEVER: 0
COUGH: 0
SORE THROAT: 0
FOCAL WEAKNESS: 0
SEIZURES: 0
HEADACHES: 0
EYE PAIN: 0

## 2017-07-30 NOTE — PROGRESS NOTES
Shadia Manrique Fall Risk Assessment:     Last Known Fall: Within the last month  Mobility: Dizziness/generalized weakness  Medications: Cardiovascular or central nervous system meds  Mental Status/LOC/Awareness: Awake, alert, and oriented to date, place, and person  Toileting Needs: Use of assistive device (Bedside commode, bedpan, urinal)  Volume/Electrolyte Status: No problems  Communication/Sensory: Visual (Glasses)/hearing deficit  Behavior: Appropriate behavior  Shadia Manrique Fall Risk Total: 9  Fall Risk Level: LOW RISK    Universal Fall Precautions:  call light/belongings in reach, bed in low position and locked, wheelchairs and assistive devices out of sight, siderails up x 2, use non-slip footwear, adequate lighting, clutter free and spill free environment, educate on level of risk, educate to call for assistance    Fall Risk Level Interventions:   TRIAL ([employer] 8, NEURO, MED GABBIE 5) Low Fall Risk Interventions  Place yellow fall risk ID band on patient: verified  Provide patient/family education based on risk assessment: completed  Educate patient/family to call staff for assistance when getting out of bed: completed  Place fall precaution signage outside patient door: verifiedTRIAL ([employer] 8, NEURO, MED GABBIE 5) Moderate Fall Risk Interventions  Place yellow fall risk ID band on patient: verified  Provide patient/family education based on risk assessment : completed  Educate patient/family to call staff for assistance when getting out of bed: completed  Place fall precaution signage outside patient door: completed  Utilize bed/chair fall alarm: refused     Patient Specific Interventions:     Medication: review medications with patient and family and provide patients who received diuretics/laxatives frequent toileting  Mental Status/LOC/Awareness: reorient patient, reinforce falls education, check on patient hourly, utilize bed/chair fall alarm, reinforce the use of call light and provide activity  Toileting:  provide frquent toileting, monitor intake and output/use of appropriate interventions, instruct patient/family on the use of grab bars, instruct patient/family on the need to call for assistance when toileting and do not leave patient unattended in bathroom/refer to toileting scripting  Volume/Electrolyte Status: ensure patient remains hydrated, administer medications as ordered for nausea and vomiting, monitor abnormal lab values, ensure IV fluids are appropriate and teach patients to dangle before rising if hypotensive  Communication/Sensory: update plan of care on whiteboard, ensure proper positioning when transferrng/ambulating, ensure patient has glasses/contacts and hearing aids/dentures and for visually impaired patients orient to their room surrounding and do not change their surroundings  Behavioral: encourage patient to voice feelings, engage patient in daily activities and administer medication as ordered  Mobility: provide comfort measures during transport, utilize bed/chair fall alarm, ensure bed is locked and in lowest position and provide appropriate assistive device

## 2017-07-30 NOTE — PROGRESS NOTES
[facility] Hospitalist Progress Note    Date of Service: 2017    Chief Complaint  36 y.o. female admitted 2017 with Uremic Encephalopathy, Respiratory failure, pulmonary edema secondary to volume overload secondary to noncompliance with HD for ESRD with Lupus nephritis. Noted to have right leg weakness with chronic back pain.     Interval Problem Update  No acute issue overnight. No additional narcotics for the patient.    Consultants/Specialty  Nephrology    Disposition  MRI pending, HD schedule per Nephrology        Review of Systems   Constitutional: Positive for malaise/fatigue. Negative for fever and chills.   HENT: Negative for sore throat.    Eyes: Negative for blurred vision and pain.   Respiratory: Negative for cough and wheezing.    Cardiovascular: Negative for chest pain and orthopnea.   Gastrointestinal: Positive for nausea. Negative for heartburn, vomiting and abdominal pain.   Musculoskeletal: Positive for back pain.   Neurological: Positive for dizziness, focal weakness and weakness. Negative for speech change, seizures and headaches.      Physical Exam  Laboratory/Imaging   Hemodynamics  Temp (24hrs), Av.6 °C (97.9 °F), Min:36.3 °C (97.3 °F), Max:36.9 °C (98.4 °F)   Temperature: 36.7 °C (98.1 °F)  Pulse  Av  Min: 58  Max: 132   Blood Pressure: 120/71 mmHg      Respiratory      Respiration: 16, Pulse Oximetry: 100 %     Work Of Breathing / Effort: Mild  RUL Breath Sounds: Clear, RML Breath Sounds: Clear, RLL Breath Sounds: Diminished, LASHANDA Breath Sounds: Clear, LLL Breath Sounds: Diminished    Fluids  No intake or output data in the 24 hours ending 17 0721    Nutrition  Orders Placed This Encounter   Procedures   • DIET ORDER     Standing Status: Standing      Number of Occurrences: 1      Standing Expiration Date:      Order Specific Question:  Diet:     Answer:  Renal [8]     Physical Exam   Constitutional: She is oriented to person, place, and time. She appears well-developed  and well-nourished.   HENT:   Head: Normocephalic and atraumatic.   Eyes: Conjunctivae are normal. Pupils are equal, round, and reactive to light.   Neck: Neck supple. No thyromegaly present.   Cardiovascular: Normal rate and regular rhythm.    Pulmonary/Chest: Effort normal. No respiratory distress.   Abdominal: Soft. She exhibits no distension. There is no tenderness.   Neurological: She is alert and oriented to person, place, and time.   MMT BUE ad LLE 5/5, RLE 4+/5   Skin: Skin is warm and dry.   Nursing note and vitals reviewed.      Recent Labs      07/28/17 0219 07/29/17 0435 07/30/17   0530   WBC  15.4*  10.2  8.0   RBC  2.92*  3.01*  2.96*   HEMOGLOBIN  9.1*  9.4*  9.3*   HEMATOCRIT  29.8*  30.5*  30.1*   MCV  102.1*  101.3*  101.7*   MCH  31.2  31.2  31.4   MCHC  30.5*  30.8*  30.9*   RDW  52.9*  53.4*  52.9*   PLATELETCT  172  166  141*   MPV  10.4  11.0  11.0     Recent Labs      07/28/17 0219 07/29/17   0435  07/30/17   0530   SODIUM  134*  134*  134*   POTASSIUM  4.4  4.3  4.3   CHLORIDE  97  97  97   CO2  26  26  24   GLUCOSE  94  96  86   BUN  50*  36*  49*   CREATININE  8.56*  6.15*  8.91*   CALCIUM  8.6  8.6  8.5                      Assessment/Plan     Medical non-compliance (present on admission)  Assessment & Plan  Counseling    Uremic encephalopathy (present on admission)  Assessment & Plan  resolving    ESRD (end stage renal disease) (CMS-HCC) (present on admission)  Assessment & Plan  HD M-W-F  Awaiting for HD chair at [facility]    HTN (hypertension) (present on admission)  Assessment & Plan  Hold Norvasc, Coreg    Lupus (CMS-HCC) (present on admission)  Assessment & Plan  Plaquenil, Prednisone    Acute hypoxemic respiratory failure (CMS-HCC) (present on admission)  Assessment & Plan  Off O2     Iron deficiency  Assessment & Plan  FeSO4    Hypokalemia  Assessment & Plan  Follow bmp    Narcotic dependence (CMS-HCC) (present on admission)  Assessment & Plan  Only outpatient pain  meds    Anemia (present on admission)  Assessment & Plan  transfused PRBC, follow cbc, Epogen?    Leg weakness (present on admission)  Assessment & Plan  MRI spine: no acute finding  PT/OT    Low back pain (present on admission)  Assessment & Plan  Lyrica, Tizanidine    Depression  Assessment & Plan  Wellbutrin and Trazodone       Labs reviewed, Medications reviewed and Radiology images reviewed  Taylor catheter: No Taylor        DVT prophylaxis - mechanical: SCDs  Ulcer prophylaxis: Yes

## 2017-07-30 NOTE — CARE PLAN
Problem: Infection  Goal: Will remain free from infection  Intervention: Assess signs and symptoms of infection  Re-accessed port per [facility] policy, maintained sterile field through out dressing change.   Pt is refusing TKO through port line at this time. Educated pt on importance of having TKO fluid infusing through central line, pt verbalized understanding of need for TKO IVF and continues to refuse TKO IVF.       Problem: Discharge Barriers/Planning  Goal: Patient’s continuum of care needs will be met  Pending acceptance to [facility] for outpatient dialysis.

## 2017-07-30 NOTE — PROGRESS NOTES
Pt awaiting dialysis chair for [place] which should be set up tomorrow. Pt to discharge after dialysis finished here and chair is obtained. Pt to dc home.

## 2017-07-30 NOTE — PROGRESS NOTES
Nephrology Progress Note, Adult, Complex               Author: Leana Sivakumar Date & Time created: 7/30/2017  1:43 PM     Interval History:  36-year-old female with end-stage renal disease secondary to lupus nephritis, who did not come to the dialysis unit for over a month and came in with scott uremia. The patient had her first dialysis yesterday, low flow and tolerated it relatively well. Her mental status is improved. Apparently she was planning on switching to peritoneal dialysis. She was evaluated by the peritoneal dialysis unit, but was not deemed an appropriate candidate due to noncompliance.  Doing better.  No acute events.  HD MWF    Review of Systems:  Review of Systems   Constitutional: Positive for malaise/fatigue. Negative for fever and chills.   HENT: Negative.    Eyes: Negative.    Respiratory: Negative for cough, hemoptysis, shortness of breath and wheezing.    Cardiovascular: Negative for chest pain, palpitations, orthopnea and leg swelling.   Gastrointestinal: Negative for heartburn, nausea, vomiting and abdominal pain.   Musculoskeletal: Positive for back pain and joint pain. Negative for myalgias.   Skin: Negative.    Neurological: Positive for weakness. Negative for dizziness, sensory change and focal weakness.       Physical Exam:  Physical Exam   Constitutional: She appears well-developed and well-nourished. No distress.   HENT:   Head: Normocephalic and atraumatic.   Eyes: Conjunctivae and EOM are normal. Pupils are equal, round, and reactive to light.   Neck: Normal range of motion. Neck supple.   Cardiovascular: Normal rate and regular rhythm.  Exam reveals no gallop and no friction rub.    Pulmonary/Chest: Effort normal and breath sounds normal.   Abdominal: Soft. Bowel sounds are normal. She exhibits no distension. There is no tenderness.   Musculoskeletal: She exhibits no edema or tenderness.   Neurological: She is alert. No cranial nerve deficit.   Skin: Skin is warm and dry. No rash  noted. No erythema.       Labs:        Invalid input(s): [id number]      Recent Labs      17   0435  17   0530   SODIUM  134*  134*  134*   POTASSIUM  4.4  4.3  4.3   CHLORIDE  97  97  97   CO2  26  26  24   BUN  50*  36*  49*   CREATININE  8.56*  6.15*  8.91*   CALCIUM  8.6  8.6  8.5     Recent Labs      175  17   0530   GLUCOSE  94  96  86     Recent Labs      17   0435  17   0530   RBC  2.92*  3.01*  2.96*   HEMOGLOBIN  9.1*  9.4*  9.3*   HEMATOCRIT  29.8*  30.5*  30.1*   PLATELETCT  172  166  141*     Recent Labs      17   0530   WBC  15.4*  10.2  8.0   NEUTSPOLYS  62.60  55.60  48.20   LYMPHOCYTES  22.40  27.20  30.20   MONOCYTES  11.20  10.80  14.70*   EOSINOPHILS  0.20  1.90  2.40   BASOPHILS  0.40  0.60  0.60           Hemodynamics:  Temp (24hrs), Av.6 °C (97.9 °F), Min:36.3 °C (97.3 °F), Max:36.9 °C (98.4 °F)  Temperature: 36.7 °C (98.1 °F)  Pulse  Av  Min: 58  Max: 132  Blood Pressure: 120/71 mmHg     Respiratory:    Respiration: 16, Pulse Oximetry: 100 %     Work Of Breathing / Effort: Mild  RUL Breath Sounds: Clear, RML Breath Sounds: Clear, RLL Breath Sounds: Diminished, LASHANDA Breath Sounds: Clear, LLL Breath Sounds: Diminished  Fluids:    Intake/Output Summary (Last 24 hours) at 17 1343  Last data filed at 17 1137   Gross per 24 hour   Intake    528 ml   Output      0 ml   Net    528 ml        GI/Nutrition:  Orders Placed This Encounter   Procedures   • DIET ORDER     Standing Status: Standing      Number of Occurrences: 1      Standing Expiration Date:      Order Specific Question:  Diet:     Answer:  Renal [8]     Medical Decision Making, by Problem:  Active Hospital Problems    Diagnosis   • Sepsis (CMS-[facility]) [A41.9]   • Uremic encephalopathy [G93.41, N19]   • Pneumonia [J18.9]   • Acute hypoxemic respiratory failure (CMS-[facility]) [J96.01]   •  Asterixis [R27.8]   • Anemia [D64.9]   • ESRD (end stage renal disease) (CMS-[facility]) [N18.6]   • Leg weakness [R29.898]   • Low back pain [M54.5]   • HTN (hypertension) [I10]   • Lupus (CMS-HCC) [M32.9]   • Medical non-compliance [Z91.19]   • Narcotic dependence (CMS-HCC) [F11.20]     1. End-stage renal disease/HD -MWF  2. Uremia due to noncompliance with dialysis - better  3. Uremic pericarditis  4. History of lupus nephritis  5. Anemia; on epogen  6. Electrolytes controlled   -HD MWF   -renal diet   -all meds to renal doses   -may d/c tomorrow if accepted in [facility]    Labs reviewed and Medications reviewed

## 2017-07-30 NOTE — PROGRESS NOTES
Rec'd report from day shift RN. Assumed pt care. Assessment completed. AA&OX4. Denies pain at this time. No s/s of discomfort or distress. Jerky, tremors noted to BUE, baseline per pt report. N/T to BLE. Pt ambulates to the hallway and to the bathroom, maintains steady gait. Bed in lowest position, bed locked, treaded socks in place, RN and CNA numbers provided, call light within reach.

## 2017-07-31 LAB
ANION GAP SERPL CALC-SCNC: 13 MMOL/L (ref 0–11.9)
BASOPHILS # BLD AUTO: 0.7 % (ref 0–1.8)
BASOPHILS # BLD: 0.05 K/UL (ref 0–0.12)
BUN SERPL-MCNC: 63 MG/DL (ref 8–22)
CALCIUM SERPL-MCNC: 9 MG/DL (ref 8.5–10.5)
CHLORIDE SERPL-SCNC: 99 MMOL/L (ref 96–112)
CO2 SERPL-SCNC: 24 MMOL/L (ref 20–33)
CREAT SERPL-MCNC: 10.73 MG/DL (ref 0.5–1.4)
EOSINOPHIL # BLD AUTO: 0.23 K/UL (ref 0–0.51)
EOSINOPHIL NFR BLD: 3.1 % (ref 0–6.9)
ERYTHROCYTE [DISTWIDTH] IN BLOOD BY AUTOMATED COUNT: 54.1 FL (ref 35.9–50)
GFR SERPL CREATININE-BSD FRML MDRD: 4 ML/MIN/1.73 M 2
GLUCOSE SERPL-MCNC: 88 MG/DL (ref 65–99)
HCT VFR BLD AUTO: 29.3 % (ref 37–47)
HGB BLD-MCNC: 9.2 G/DL (ref 12–16)
IMM GRANULOCYTES # BLD AUTO: 0.18 K/UL (ref 0–0.11)
IMM GRANULOCYTES NFR BLD AUTO: 2.4 % (ref 0–0.9)
LYMPHOCYTES # BLD AUTO: 2.36 K/UL (ref 1–4.8)
LYMPHOCYTES NFR BLD: 31.6 % (ref 22–41)
MCH RBC QN AUTO: 31.9 PG (ref 27–33)
MCHC RBC AUTO-ENTMCNC: 31.4 G/DL (ref 33.6–35)
MCV RBC AUTO: 101.7 FL (ref 81.4–97.8)
MONOCYTES # BLD AUTO: 1.05 K/UL (ref 0–0.85)
MONOCYTES NFR BLD AUTO: 14.1 % (ref 0–13.4)
NEUTROPHILS # BLD AUTO: 3.59 K/UL (ref 2–7.15)
NEUTROPHILS NFR BLD: 48.1 % (ref 44–72)
NRBC # BLD AUTO: 0 K/UL
NRBC BLD AUTO-RTO: 0 /100 WBC
PLATELET # BLD AUTO: 146 K/UL (ref 164–446)
PMV BLD AUTO: 10.8 FL (ref 9–12.9)
POTASSIUM SERPL-SCNC: 4.8 MMOL/L (ref 3.6–5.5)
RBC # BLD AUTO: 2.88 M/UL (ref 4.2–5.4)
SODIUM SERPL-SCNC: 136 MMOL/L (ref 135–145)
WBC # BLD AUTO: 7.5 K/UL (ref 4.8–10.8)

## 2017-07-31 PROCEDURE — 97112 NEUROMUSCULAR REEDUCATION: CPT

## 2017-07-31 PROCEDURE — A9270 NON-COVERED ITEM OR SERVICE: HCPCS | Performed by: INTERNAL MEDICINE

## 2017-07-31 PROCEDURE — 770006 HCHG ROOM/CARE - MED/SURG/GYN SEMI*

## 2017-07-31 PROCEDURE — 700102 HCHG RX REV CODE 250 W/ 637 OVERRIDE(OP): Performed by: INTERNAL MEDICINE

## 2017-07-31 PROCEDURE — A9270 NON-COVERED ITEM OR SERVICE: HCPCS | Performed by: HOSPITALIST

## 2017-07-31 PROCEDURE — 97535 SELF CARE MNGMENT TRAINING: CPT

## 2017-07-31 PROCEDURE — A9270 NON-COVERED ITEM OR SERVICE: HCPCS | Performed by: FAMILY MEDICINE

## 2017-07-31 PROCEDURE — 80048 BASIC METABOLIC PNL TOTAL CA: CPT

## 2017-07-31 PROCEDURE — 700102 HCHG RX REV CODE 250 W/ 637 OVERRIDE(OP): Performed by: HOSPITALIST

## 2017-07-31 PROCEDURE — 700102 HCHG RX REV CODE 250 W/ 637 OVERRIDE(OP): Performed by: FAMILY MEDICINE

## 2017-07-31 PROCEDURE — 97116 GAIT TRAINING THERAPY: CPT

## 2017-07-31 PROCEDURE — 99232 SBSQ HOSP IP/OBS MODERATE 35: CPT | Performed by: INTERNAL MEDICINE

## 2017-07-31 PROCEDURE — 700111 HCHG RX REV CODE 636 W/ 250 OVERRIDE (IP)

## 2017-07-31 PROCEDURE — 85025 COMPLETE CBC W/AUTO DIFF WBC: CPT

## 2017-07-31 PROCEDURE — 90935 HEMODIALYSIS ONE EVALUATION: CPT

## 2017-07-31 RX ORDER — FERROUS SULFATE 325(65) MG
325 TABLET ORAL 2 TIMES DAILY WITH MEALS
Qty: 30 TAB | Refills: 0 | Status: SHIPPED | OUTPATIENT
Start: 2017-07-31 | End: 2017-08-11

## 2017-07-31 RX ORDER — HEPARIN SODIUM 1000 [USP'U]/ML
INJECTION, SOLUTION INTRAVENOUS; SUBCUTANEOUS
Status: COMPLETED
Start: 2017-07-31 | End: 2017-07-31

## 2017-07-31 RX ORDER — FAMOTIDINE 20 MG/1
20 TABLET, FILM COATED ORAL DAILY
Qty: 30 TAB | Refills: 0 | Status: SHIPPED | OUTPATIENT
Start: 2017-07-31 | End: 2017-08-11

## 2017-07-31 RX ADMIN — PROMETHAZINE HYDROCHLORIDE 25 MG: 25 TABLET ORAL at 21:50

## 2017-07-31 RX ADMIN — Medication 325 MG: at 16:37

## 2017-07-31 RX ADMIN — BUPROPION HYDROCHLORIDE 75 MG: 75 TABLET, FILM COATED ORAL at 20:09

## 2017-07-31 RX ADMIN — HEPARIN SODIUM 2000 UNITS: 1000 INJECTION, SOLUTION INTRAVENOUS; SUBCUTANEOUS at 07:25

## 2017-07-31 RX ADMIN — VALACYCLOVIR 500 MG: 500 TABLET, FILM COATED ORAL at 22:37

## 2017-07-31 RX ADMIN — HYDROXYCHLOROQUINE SULFATE 200 MG: 200 TABLET, FILM COATED ORAL at 21:50

## 2017-07-31 RX ADMIN — OXYCODONE HYDROCHLORIDE 20 MG: 10 TABLET ORAL at 13:48

## 2017-07-31 RX ADMIN — OXYCODONE HYDROCHLORIDE 20 MG: 10 TABLET ORAL at 06:06

## 2017-07-31 RX ADMIN — TRAZODONE HYDROCHLORIDE 50 MG: 50 TABLET ORAL at 20:10

## 2017-07-31 RX ADMIN — PREGABALIN 100 MG: 100 CAPSULE ORAL at 13:48

## 2017-07-31 RX ADMIN — TIZANIDINE 4 MG: 4 TABLET ORAL at 20:10

## 2017-07-31 RX ADMIN — PREGABALIN 100 MG: 100 CAPSULE ORAL at 20:10

## 2017-07-31 RX ADMIN — OXYCODONE HYDROCHLORIDE 20 MG: 10 TABLET ORAL at 21:50

## 2017-07-31 ASSESSMENT — COGNITIVE AND FUNCTIONAL STATUS - GENERAL
SUGGESTED CMS G CODE MODIFIER MOBILITY: CH
MOBILITY SCORE: 24

## 2017-07-31 ASSESSMENT — ENCOUNTER SYMPTOMS
DIZZINESS: 1
SORE THROAT: 0
BLURRED VISION: 0
FEVER: 0
WEAKNESS: 1
FOCAL WEAKNESS: 1
HEARTBURN: 0
SPEECH CHANGE: 0
WHEEZING: 0
SEIZURES: 0
ORTHOPNEA: 0
ABDOMINAL PAIN: 0
NAUSEA: 1
BACK PAIN: 1
COUGH: 0
HEADACHES: 0

## 2017-07-31 ASSESSMENT — PAIN SCALES - GENERAL
PAINLEVEL_OUTOF10: 7
PAINLEVEL_OUTOF10: 9
PAINLEVEL_OUTOF10: 8
PAINLEVEL_OUTOF10: 8
PAINLEVEL_OUTOF10: 6

## 2017-07-31 ASSESSMENT — GAIT ASSESSMENTS
DEVIATION: BRADYKINETIC;DECREASED TOE OFF
ASSISTIVE DEVICE: HAND HELD ASSIST
DISTANCE (FEET): 175
GAIT LEVEL OF ASSIST: STAND BY ASSIST

## 2017-07-31 NOTE — PROGRESS NOTES
Shadia Manrique Fall Risk Assessment:     Last Known Fall: Within the last month  Mobility: Dizziness/generalized weakness  Medications: Cardiovascular or central nervous system meds  Mental Status/LOC/Awareness: Awake, alert, and oriented to date, place, and person  Toileting Needs: No needs  Volume/Electrolyte Status: No problems  Communication/Sensory: Visual (Glasses)/hearing deficit  Behavior: Appropriate behavior  Shadia Manrique Fall Risk Total: 7  Fall Risk Level: LOW RISK    Universal Fall Precautions:  call light/belongings in reach, bed in low position and locked, wheelchairs and assistive devices out of sight, siderails up x 2, use non-slip footwear, adequate lighting, clutter free and spill free environment, educate on level of risk, educate to call for assistance    Fall Risk Level Interventions:   TRIAL (OONi, NEURO, MED GABBIE 5) Low Fall Risk Interventions  Place yellow fall risk ID band on patient: verified  Provide patient/family education based on risk assessment: completed  Educate patient/family to call staff for assistance when getting out of bed: completed  Place fall precaution signage outside patient door: verifiedTRIAL (OONi, NEURO, MED GABBIE 5) Moderate Fall Risk Interventions  Place yellow fall risk ID band on patient: verified  Provide patient/family education based on risk assessment : completed  Educate patient/family to call staff for assistance when getting out of bed: completed  Place fall precaution signage outside patient door: completed  Utilize bed/chair fall alarm: refused     Patient Specific Interventions:     Medication: review medications with patient and family, limit combination of prn medications and provide patients who received diuretics/laxatives frequent toileting  Mental Status/LOC/Awareness: reinforce falls education, check on patient hourly, reinforce the use of call light and provide activity  Toileting: provide frquent toileting, monitor intake and output/use of  appropriate interventions, instruct patient/family on the use of grab bars, instruct patient/family on the need to call for assistance when toileting and do not leave patient unattended in bathroom/refer to toileting scripting  Volume/Electrolyte Status: ensure patient remains hydrated, administer medications as ordered for nausea and vomiting, monitor abnormal lab values and teach patients to dangle before rising if hypotensive  Communication/Sensory: update plan of care on whiteboard, ensure proper positioning when transferrng/ambulating, ensure patient has glasses/contacts and hearing aids/dentures and for visually impaired patients orient to their room surrounding and do not change their surroundings  Behavioral: encourage patient to voice feelings, engage patient in daily activities, administer medication as ordered and encourage family to stay with impulsive patients  Mobility: provide comfort measures during transport, dangle prior to standing, ensure bed is locked and in lowest position, provide appropriate assistive device and instruct patient to exit bed on their strongest side

## 2017-07-31 NOTE — THERAPY
"Pt much improved and adamant about moving w/o AD....Pt MOD I for bed mob, transfers, and gait w/o AD with SUPV - and VCS/TCS for improved cadance. Pt negotiated 2 flights of stairs up  then down with SUPV using handrail. Pt needed seated rest stops during activitys 2* to fatigue but showed no LOB during application of external pertubations with locomotion. Pt will benifit from acute rehab in house and appears safe to be at home with Family/friends for  support. Pt will benifit from  or OP rehab to address current functional deficits.Physical Therapy Treatment completed.   Bed Mobility:  Supine to Sit: Modified Independent  Transfers: Sit to Stand: Supervised  Gait: Level Of Assist: Stand by Assist with No Equipment Needed       Plan of Care: Will benefit from Physical Therapy 2 times per week  Discharge Recommendations: Equipment: Single Point Cane. Post-acute therapy Discharge to home with outpatient or home health for additional skilled therapy services.     See \"Rehab Therapy-Acute\" Patient Summary Report for complete documentation.       "

## 2017-07-31 NOTE — PROGRESS NOTES
[facility] Hospitalist Progress Note    Date of Service: 2017    Chief Complaint  36 y.o. female admitted 2017 with Uremic Encephalopathy, Respiratory failure, pulmonary edema secondary to volume overload secondary to noncompliance with HD for ESRD with Lupus nephritis. Noted to have right leg weakness with chronic back pain.     Interval Problem Update  No acute issue overnight. HD today.    Consultants/Specialty  Nephrology    Disposition  Await HD chair        Review of Systems   Constitutional: Positive for malaise/fatigue. Negative for fever.   HENT: Negative for sore throat.    Eyes: Negative for blurred vision.   Respiratory: Negative for cough and wheezing.    Cardiovascular: Negative for chest pain and orthopnea.   Gastrointestinal: Positive for nausea. Negative for heartburn and abdominal pain.   Musculoskeletal: Positive for back pain.   Neurological: Positive for dizziness, focal weakness and weakness. Negative for speech change, seizures and headaches.      Physical Exam  Laboratory/Imaging   Hemodynamics  Temp (24hrs), Av.3 °C (97.4 °F), Min:36.2 °C (97.2 °F), Max:36.5 °C (97.7 °F)   Temperature: 36.2 °C (97.2 °F)  Pulse  Av.8  Min: 58  Max: 132   Blood Pressure: 118/67 mmHg      Respiratory      Respiration: 19, Pulse Oximetry: 97 %     Work Of Breathing / Effort: Mild  RUL Breath Sounds: Clear, RML Breath Sounds: Clear, RLL Breath Sounds: Diminished, LASHANDA Breath Sounds: Clear, LLL Breath Sounds: Diminished    Fluids    Intake/Output Summary (Last 24 hours) at 17 1641  Last data filed at 17 1439   Gross per 24 hour   Intake    736 ml   Output   2200 ml   Net  -1464 ml       Nutrition  Orders Placed This Encounter   Procedures   • DIET ORDER     Standing Status: Standing      Number of Occurrences: 1      Standing Expiration Date:      Order Specific Question:  Diet:     Answer:  Renal [8]     Physical Exam   Constitutional: She appears well-developed and well-nourished.    HENT:   Head: Normocephalic and atraumatic.   Eyes: Conjunctivae are normal. Pupils are equal, round, and reactive to light.   Neck: Neck supple.   Cardiovascular: Normal rate and regular rhythm.    Pulmonary/Chest: Effort normal. No respiratory distress.   Abdominal: Soft. She exhibits no distension.   Neurological: She is alert.   MMT BUE ad LLE 5/5, RLE 4+/5   Skin: Skin is warm and dry.   Nursing note and vitals reviewed.      Recent Labs      07/29/17 0435 07/30/17   0530 07/31/17   0529   WBC  10.2  8.0  7.5   RBC  3.01*  2.96*  2.88*   HEMOGLOBIN  9.4*  9.3*  9.2*   HEMATOCRIT  30.5*  30.1*  29.3*   MCV  101.3*  101.7*  101.7*   MCH  31.2  31.4  31.9   MCHC  30.8*  30.9*  31.4*   RDW  53.4*  52.9*  54.1*   PLATELETCT  166  141*  146*   MPV  11.0  11.0  10.8     Recent Labs      07/29/17   0435  07/30/17   0530  07/31/17   0529   SODIUM  134*  134*  136   POTASSIUM  4.3  4.3  4.8   CHLORIDE  97  97  99   CO2  26  24  24   GLUCOSE  96  86  88   BUN  36*  49*  63*   CREATININE  6.15*  8.91*  10.73*   CALCIUM  8.6  8.5  9.0                      Assessment/Plan     Medical non-compliance (present on admission)  Assessment & Plan  Counseling    Uremic encephalopathy (present on admission)  Assessment & Plan  resolving    ESRD (end stage renal disease) (CMS-HCC) (present on admission)  Assessment & Plan  HD M-W-F, HD today  Awaiting for HD chair at [facility]    HTN (hypertension) (present on admission)  Assessment & Plan  Hold Norvasc, Coreg    Lupus (CMS-HCC) (present on admission)  Assessment & Plan  Plaquenil, Prednisone    Acute hypoxemic respiratory failure (CMS-HCC) (present on admission)  Assessment & Plan  Off O2     Iron deficiency  Assessment & Plan  FeSO4    Hypokalemia  Assessment & Plan  Follow bmp    Narcotic dependence (CMS-HCC) (present on admission)  Assessment & Plan  Only outpatient pain meds    Anemia (present on admission)  Assessment & Plan  transfused PRBC, follow cbc, Epogen?    Leg  weakness (present on admission)  Assessment & Plan  MRI spine: no acute finding  PT/OT    Low back pain (present on admission)  Assessment & Plan  Lyrica, Tizanidine    Depression  Assessment & Plan  Wellbutrin and Trazodone       Labs reviewed, Medications reviewed and Radiology images reviewed  Taylor catheter: No Taylor        DVT prophylaxis - mechanical: SCDs  Ulcer prophylaxis: Yes

## 2017-07-31 NOTE — PROGRESS NOTES
Patient returns from dialysis. Dialysis RN calls report and states that patient remained hemodynamically stable and otherwise WNL throughout hemodialysis.    Patient denies pain upon arrival to unit. Plan for discharge per 's orders discussed. Patient verbalizes understanding. Rationale for new prescriptions upon discharge discussed. Patient vocalizes understanding.    States that grandmother is available to provide transportation home. Grandmother, Shruthi, is listed as patient's #1 emergency Contact on Facesheet.    Will confirm that Dialysis Chair is available prior to discharge.

## 2017-07-31 NOTE — DISCHARGE PLANNING
KLAUDIA SHEPHERD for Daphne with pt pathways.      Per Crystal with pt pathways, she sent the referral to Jannette Ramirez, and pt's quant gold should be back today.     KLAUDIA will f/u.

## 2017-07-31 NOTE — PROGRESS NOTES
Rec'd report from day shift RN. Assumed pt care. Assessment completed. AA&OX4. Denies pain at this time. No s/s of discomfort or distress. Jerky, tremors noted to BUE, baseline per pt report. N/T to RLE. Pt ambulates to the hallway and to the bathroom, maintains steady gait. Bed in lowest position, bed locked, treaded socks in place, RN and CNA numbers provided, call light within reach.

## 2017-07-31 NOTE — CARE PLAN
Problem: Safety  Goal: Will remain free from injury  Intervention: Provide assistance with mobility  Pt has refused the bed alarm, educated pt on need for bed alarm. Pt continues to refuse bed alarm. Pt calls appropriately, bed is near nursing station.       Problem: Pain Management  Goal: Pain level will decrease to patient’s comfort goal  No outward signs of pain noted. Pt is currently sleeping.

## 2017-07-31 NOTE — PROGRESS NOTES
HEMODIALYSIS NOTES:     HD today x 3 hours per Dr. Shaver. Initiated at 0743 and ended at 1043. Patient stable, no SOB, denies pain. Patient tolerated treatment. Please see paper flowsheet for details.    UF Net: 1,700 mL    Blood returned. Applied gauze and held R AVF site for 10 minutes. Verified no bleeding. Bruit and thrill present post dialysis. Instructions given to Primary RN that if bleeding occurs on the AVF site, change dressing and held the site with pressure. Report given to HILTON Martin RN.

## 2017-07-31 NOTE — DISCHARGE SUMMARY
CHIEF COMPLAINT ON ADMISSION  Chief Complaint   Patient presents with   • Fever     x 48 hours, max 103   • Leg Pain     RLE, NWB at home       CODE STATUS  Full Code    HPI & HOSPITAL COURSE  This is a 36 y.o. female here with fever and right lower extremity pain. She has history of end-stage renal disease on hemodialysis. Because of her initial presentation with sepsis the patient was started on empiric antibiotic. Later on she was ruled out since she doesn't have any obvious infection and antibiotics was discontinued. Her SIRS was more likely related to fluid overload from pulmonary edema. All her cultures were negative.  Also she was found to have uremic encephalopathy and because of that nephrologist was consulted and started on hemodialysis. Also she was initially found to have acute hypoxic respiratory failure following volume overload. She will receive hemodialysis 3 times a day. The patient has history of noncompliance with hemodialysis in the past.  Also she complained about right leg weakness intermittently and because of the MRI of the lumbar spine was done and was negative.  Patient also has chronic pain syndrome and Dulce the hospitalization she keep asking for IV pain medication. She is on oxycodone 20 mg every 6 hours when necessary. Please do not give her additional pain medication.    Therefore, she is discharged in fair and stable condition with close outpatient follow-up.    SPECIFIC OUTPATIENT FOLLOW-UP      DISCHARGE PROBLEM LIST  Active Problems:    Medical non-compliance POA: Yes    Uremic encephalopathy POA: Yes    ESRD (end stage renal disease) (CMS-[facility]) (Chronic) POA: Yes    HTN (hypertension) POA: Yes    Lupus (CMS-HCC) POA: Yes    Acute hypoxemic respiratory failure (CMS-[facility]) POA: Yes    Iron deficiency POA: Unknown    Hypokalemia POA: Unknown    Narcotic dependence (CMS-[facility]) POA: Yes    Anemia POA: Yes    Leg weakness POA: Yes    Low back pain POA: Yes    Depression POA:  Unknown  Resolved Problems:    Asterixis POA: Yes      FOLLOW UP  No future appointments.  Sushila Manzano M.D.  [address]  [address]  [facility] [address]  [phone]    In 1 week      postdischarge clinic follow-up in 1 week            MEDICATIONS ON DISCHARGE   Debby Carrizales   Home Medication Instructions CONI:[id number]    Printed on:07/31/17 0883   Medication Information                      amlodipine (NORVASC) 5 MG Tab  Take 5 mg by mouth as needed.             ascorbic acid (ASCORBIC ACID) 500 MG Tab  Take 500 mg by mouth every day.             buPROPion (WELLBUTRIN XL) 150 MG XL tablet  Take 150 mg by mouth every morning.             cholecalciferol (VITAMIN D3) 5000 UNIT Cap  Take 10,000 Units by mouth every day.             famotidine (PEPCID) 20 MG Tab  Take 1 Tab by mouth every day.             ferrous sulfate 325 (65 FE) MG tablet  Take 1 Tab by mouth 2 times a day, with meals.             hydroxychloroquine (PLAQUENIL) 200 MG Tab  Take 200 mg by mouth every bedtime.             Oxycodone HCl 20 MG Tab  Take 20 mg by mouth every 6 hours as needed.             pregabalin (LYRICA) 150 MG Cap  Take 150 mg by mouth 3 times a day.             promethazine (PHENERGAN) 25 MG Tab  Take 25 mg by mouth every 6 hours as needed for Nausea/Vomiting.             tizanidine (ZANAFLEX) 4 MG Tab  Take 2 Tabs by mouth every bedtime.             trazodone (DESYREL) 50 MG Tab  Take 1 Tab by mouth every bedtime.                 DIET  Orders Placed This Encounter   Procedures   • DIET ORDER     Standing Status: Standing      Number of Occurrences: 1      Standing Expiration Date:      Order Specific Question:  Diet:     Answer:  Renal [8]       ACTIVITY  As tolerated.  Weight bearing as tolerated      CONSULTATIONS      PROCEDURES      LABORATORY  Lab Results   Component Value Date/Time    SODIUM 136 07/31/2017 05:29 AM    POTASSIUM 4.8 07/31/2017 05:29 AM    CHLORIDE 99 07/31/2017 05:29 AM    CO2 24  07/31/2017 05:29 AM    GLUCOSE 88 07/31/2017 05:29 AM    BUN 63* 07/31/2017 05:29 AM    CREATININE 10.73* 07/31/2017 05:29 AM        Lab Results   Component Value Date/Time    WBC 7.5 07/31/2017 05:29 AM    HEMOGLOBIN 9.2* 07/31/2017 05:29 AM    HEMATOCRIT 29.3* 07/31/2017 05:29 AM    PLATELET COUNT 146* 07/31/2017 05:29 AM        Total time of the discharge process exceeds 40 minutes

## 2017-08-01 LAB
ANION GAP SERPL CALC-SCNC: 12 MMOL/L (ref 0–11.9)
BASOPHILS # BLD AUTO: 0.9 % (ref 0–1.8)
BASOPHILS # BLD: 0.07 K/UL (ref 0–0.12)
BUN SERPL-MCNC: 41 MG/DL (ref 8–22)
CALCIUM SERPL-MCNC: 9.2 MG/DL (ref 8.5–10.5)
CHLORIDE SERPL-SCNC: 98 MMOL/L (ref 96–112)
CO2 SERPL-SCNC: 28 MMOL/L (ref 20–33)
CREAT SERPL-MCNC: 7.43 MG/DL (ref 0.5–1.4)
EOSINOPHIL # BLD AUTO: 0.11 K/UL (ref 0–0.51)
EOSINOPHIL NFR BLD: 1.5 % (ref 0–6.9)
ERYTHROCYTE [DISTWIDTH] IN BLOOD BY AUTOMATED COUNT: 59.5 FL (ref 35.9–50)
GFR SERPL CREATININE-BSD FRML MDRD: 6 ML/MIN/1.73 M 2
GLUCOSE SERPL-MCNC: 95 MG/DL (ref 65–99)
HCT VFR BLD AUTO: 32.4 % (ref 37–47)
HGB BLD-MCNC: 10.1 G/DL (ref 12–16)
IMM GRANULOCYTES # BLD AUTO: 0.18 K/UL (ref 0–0.11)
IMM GRANULOCYTES NFR BLD AUTO: 2.4 % (ref 0–0.9)
LYMPHOCYTES # BLD AUTO: 2.48 K/UL (ref 1–4.8)
LYMPHOCYTES NFR BLD: 33.2 % (ref 22–41)
MCH RBC QN AUTO: 31.9 PG (ref 27–33)
MCHC RBC AUTO-ENTMCNC: 31.2 G/DL (ref 33.6–35)
MCV RBC AUTO: 102.2 FL (ref 81.4–97.8)
MONOCYTES # BLD AUTO: 1.04 K/UL (ref 0–0.85)
MONOCYTES NFR BLD AUTO: 13.9 % (ref 0–13.4)
NEUTROPHILS # BLD AUTO: 3.6 K/UL (ref 2–7.15)
NEUTROPHILS NFR BLD: 48.1 % (ref 44–72)
NRBC # BLD AUTO: 0 K/UL
NRBC BLD AUTO-RTO: 0 /100 WBC
PLATELET # BLD AUTO: 144 K/UL (ref 164–446)
PMV BLD AUTO: 10.7 FL (ref 9–12.9)
POTASSIUM SERPL-SCNC: 4.8 MMOL/L (ref 3.6–5.5)
RBC # BLD AUTO: 3.17 M/UL (ref 4.2–5.4)
SODIUM SERPL-SCNC: 138 MMOL/L (ref 135–145)
WBC # BLD AUTO: 7.5 K/UL (ref 4.8–10.8)

## 2017-08-01 PROCEDURE — 99232 SBSQ HOSP IP/OBS MODERATE 35: CPT | Performed by: HOSPITALIST

## 2017-08-01 PROCEDURE — 85025 COMPLETE CBC W/AUTO DIFF WBC: CPT

## 2017-08-01 PROCEDURE — 700102 HCHG RX REV CODE 250 W/ 637 OVERRIDE(OP): Performed by: INTERNAL MEDICINE

## 2017-08-01 PROCEDURE — 700102 HCHG RX REV CODE 250 W/ 637 OVERRIDE(OP): Performed by: FAMILY MEDICINE

## 2017-08-01 PROCEDURE — A9270 NON-COVERED ITEM OR SERVICE: HCPCS | Performed by: INTERNAL MEDICINE

## 2017-08-01 PROCEDURE — 770006 HCHG ROOM/CARE - MED/SURG/GYN SEMI*

## 2017-08-01 PROCEDURE — A9270 NON-COVERED ITEM OR SERVICE: HCPCS | Performed by: FAMILY MEDICINE

## 2017-08-01 PROCEDURE — G8987 SELF CARE CURRENT STATUS: HCPCS | Mod: CI

## 2017-08-01 PROCEDURE — A9270 NON-COVERED ITEM OR SERVICE: HCPCS | Performed by: HOSPITALIST

## 2017-08-01 PROCEDURE — 97165 OT EVAL LOW COMPLEX 30 MIN: CPT

## 2017-08-01 PROCEDURE — G8988 SELF CARE GOAL STATUS: HCPCS | Mod: CI

## 2017-08-01 PROCEDURE — 80048 BASIC METABOLIC PNL TOTAL CA: CPT

## 2017-08-01 PROCEDURE — 700102 HCHG RX REV CODE 250 W/ 637 OVERRIDE(OP): Performed by: HOSPITALIST

## 2017-08-01 PROCEDURE — G8989 SELF CARE D/C STATUS: HCPCS | Mod: CI

## 2017-08-01 RX ORDER — PREGABALIN 25 MG/1
50 CAPSULE ORAL DAILY
Status: DISCONTINUED | OUTPATIENT
Start: 2017-08-02 | End: 2017-08-04 | Stop reason: HOSPADM

## 2017-08-01 RX ADMIN — OXYCODONE HYDROCHLORIDE 20 MG: 10 TABLET ORAL at 18:13

## 2017-08-01 RX ADMIN — HYDROXYCHLOROQUINE SULFATE 200 MG: 200 TABLET, FILM COATED ORAL at 21:16

## 2017-08-01 RX ADMIN — OXYCODONE HYDROCHLORIDE 20 MG: 10 TABLET ORAL at 10:33

## 2017-08-01 RX ADMIN — TIZANIDINE 4 MG: 4 TABLET ORAL at 21:16

## 2017-08-01 RX ADMIN — BUPROPION HYDROCHLORIDE 75 MG: 75 TABLET, FILM COATED ORAL at 21:16

## 2017-08-01 RX ADMIN — TRAZODONE HYDROCHLORIDE 50 MG: 50 TABLET ORAL at 21:16

## 2017-08-01 RX ADMIN — FAMOTIDINE 20 MG: 20 TABLET, FILM COATED ORAL at 08:35

## 2017-08-01 RX ADMIN — PROMETHAZINE HYDROCHLORIDE 25 MG: 25 TABLET ORAL at 10:08

## 2017-08-01 RX ADMIN — BUPROPION HYDROCHLORIDE 75 MG: 75 TABLET, FILM COATED ORAL at 08:36

## 2017-08-01 RX ADMIN — PREGABALIN 100 MG: 100 CAPSULE ORAL at 08:35

## 2017-08-01 RX ADMIN — Medication 325 MG: at 08:35

## 2017-08-01 RX ADMIN — OXYCODONE HYDROCHLORIDE 20 MG: 10 TABLET ORAL at 04:21

## 2017-08-01 RX ADMIN — Medication 325 MG: at 17:39

## 2017-08-01 ASSESSMENT — ENCOUNTER SYMPTOMS
ABDOMINAL PAIN: 0
HEMOPTYSIS: 0
CHILLS: 0
HEARTBURN: 0
WEAKNESS: 0
ORTHOPNEA: 0
FOCAL WEAKNESS: 0
COUGH: 0
SEIZURES: 0
BACK PAIN: 1
DIZZINESS: 0
PALPITATIONS: 0
SHORTNESS OF BREATH: 0
HEADACHES: 0
SPEECH CHANGE: 0
MYALGIAS: 0
NAUSEA: 0
EYES NEGATIVE: 1
SENSORY CHANGE: 0
VOMITING: 0
WHEEZING: 0
BLURRED VISION: 0
FEVER: 0
SORE THROAT: 0

## 2017-08-01 ASSESSMENT — COGNITIVE AND FUNCTIONAL STATUS - GENERAL
PERSONAL GROOMING: A LITTLE
DRESSING REGULAR LOWER BODY CLOTHING: A LITTLE
SUGGESTED CMS G CODE MODIFIER DAILY ACTIVITY: CJ
HELP NEEDED FOR BATHING: A LITTLE
TOILETING: A LITTLE
DAILY ACTIVITIY SCORE: 20

## 2017-08-01 ASSESSMENT — PAIN SCALES - GENERAL
PAINLEVEL_OUTOF10: 5
PAINLEVEL_OUTOF10: 6
PAINLEVEL_OUTOF10: 4
PAINLEVEL_OUTOF10: 8
PAINLEVEL_OUTOF10: 8
PAINLEVEL_OUTOF10: 4

## 2017-08-01 ASSESSMENT — ACTIVITIES OF DAILY LIVING (ADL): TOILETING: INDEPENDENT

## 2017-08-01 NOTE — PROGRESS NOTES
[facility] Hospitalist Progress Note    Date of Service: 2017    Chief Complaint  36 y.o. female admitted 2017 with Uremic Encephalopathy, Respiratory failure, pulmonary edema secondary to volume overload secondary to noncompliance with HD for ESRD with Lupus nephritis. Noted to have right leg weakness with chronic back pain.     Interval Problem Update  No acute issue overnight. HD today.    lyrica dose renally adjusted today    Consultants/Specialty  Nephrology    Disposition  Await HD chair        Review of Systems   Constitutional: Positive for malaise/fatigue. Negative for fever.   HENT: Negative for sore throat.    Eyes: Negative for blurred vision.   Respiratory: Negative for cough and wheezing.    Cardiovascular: Negative for chest pain and orthopnea.   Gastrointestinal: Negative for heartburn and abdominal pain.   Musculoskeletal: Positive for back pain.   Neurological: Negative for dizziness, speech change, seizures, weakness and headaches.   All other systems reviewed and are negative.     Physical Exam  Laboratory/Imaging   Hemodynamics  Temp (24hrs), Av.4 °C (97.6 °F), Min:35.9 °C (96.7 °F), Max:36.8 °C (98.2 °F)   Temperature: 36.8 °C (98.2 °F)  Pulse  Av.3  Min: 58  Max: 132   Blood Pressure: (!) 99/48 mmHg      Respiratory      Respiration: 18, Pulse Oximetry: 94 %     Work Of Breathing / Effort: Mild  RUL Breath Sounds: Clear, RML Breath Sounds: Clear, RLL Breath Sounds: Diminished, LASHANDA Breath Sounds: Clear, LLL Breath Sounds: Diminished    Fluids    Intake/Output Summary (Last 24 hours) at 17 1310  Last data filed at 17 1137   Gross per 24 hour   Intake    358 ml   Output      0 ml   Net    358 ml       Nutrition  Orders Placed This Encounter   Procedures   • DIET ORDER     Standing Status: Standing      Number of Occurrences: 1      Standing Expiration Date:      Order Specific Question:  Diet:     Answer:  Renal [8]     Physical Exam   Constitutional: She appears  well-developed and well-nourished.   HENT:   Head: Normocephalic and atraumatic.   Eyes: Conjunctivae are normal. Pupils are equal, round, and reactive to light.   Neck: Neck supple.   Cardiovascular: Normal rate and regular rhythm.    Pulmonary/Chest: Effort normal. No respiratory distress. She has no wheezes.   Abdominal: Soft. She exhibits no distension.   Neurological: She is alert.   MMT BUE ad LLE 5/5, RLE 4+/5   Skin: Skin is warm and dry.   Nursing note and vitals reviewed.      Recent Labs      07/30/17   0530  07/31/17   0529  08/01/17   0425   WBC  8.0  7.5  7.5   RBC  2.96*  2.88*  3.17*   HEMOGLOBIN  9.3*  9.2*  10.1*   HEMATOCRIT  30.1*  29.3*  32.4*   MCV  101.7*  101.7*  102.2*   MCH  31.4  31.9  31.9   MCHC  30.9*  31.4*  31.2*   RDW  52.9*  54.1*  59.5*   PLATELETCT  141*  146*  144*   MPV  11.0  10.8  10.7     Recent Labs      07/30/17   0530  07/31/17   0529  08/01/17   0425   SODIUM  134*  136  138   POTASSIUM  4.3  4.8  4.8   CHLORIDE  97  99  98   CO2  24  24  28   GLUCOSE  86  88  95   BUN  49*  63*  41*   CREATININE  8.91*  10.73*  7.43*   CALCIUM  8.5  9.0  9.2                      Assessment/Plan     Medical non-compliance (present on admission)  Assessment & Plan  Counseling    Uremic encephalopathy (present on admission)  Assessment & Plan  resolving    ESRD (end stage renal disease) (CMS-HCC) (present on admission)  Assessment & Plan  HD M-W-F, HD today  Awaiting for HD chair at [facility]    HTN (hypertension) (present on admission)  Assessment & Plan  Hold Norvasc, Coreg    Lupus (CMS-HCC) (present on admission)  Assessment & Plan  Plaquenil, Prednisone    Acute hypoxemic respiratory failure (CMS-HCC) (present on admission)  Assessment & Plan  Off O2     Iron deficiency  Assessment & Plan  FeSO4    Hypokalemia  Assessment & Plan  Follow bmp    Narcotic dependence (CMS-HCC) (present on admission)  Assessment & Plan  Only outpatient pain meds    Anemia (present on admission)  Assessment  & Plan  transfused PRBC, follow cbc, Epogen?    Leg weakness (present on admission)  Assessment & Plan  MRI spine: no acute finding  PT/OT    Low back pain (present on admission)  Assessment & Plan  Lyrica, Tizanidine    Depression  Assessment & Plan  Wellbutrin and Trazodone       Labs reviewed, Medications reviewed and Radiology images reviewed  Taylor catheter: No Taylor        DVT prophylaxis - mechanical: SCDs  Ulcer prophylaxis: Yes

## 2017-08-01 NOTE — PROGRESS NOTES
Nephrology Progress Note, Adult, Complex               Author: Leana Sivakumar Date & Time created: 8/1/2017  1:49 PM     Interval History:  36-year-old female with end-stage renal disease secondary to lupus nephritis, who did not come to the dialysis unit for over a month and came in with scott uremia.   Doing better.  No acute events.  HD MWF    Review of Systems:  Review of Systems   Constitutional: Positive for malaise/fatigue. Negative for fever and chills.   HENT: Negative.    Eyes: Negative.    Respiratory: Negative for cough, hemoptysis, shortness of breath and wheezing.    Cardiovascular: Negative for chest pain, palpitations, orthopnea and leg swelling.   Gastrointestinal: Negative for heartburn, nausea, vomiting and abdominal pain.   Musculoskeletal: Positive for back pain and joint pain. Negative for myalgias.   Skin: Negative.    Neurological: Negative for dizziness, sensory change and focal weakness.       Physical Exam:  Physical Exam   Constitutional: She appears well-developed and well-nourished. No distress.   HENT:   Head: Normocephalic and atraumatic.   Eyes: Conjunctivae and EOM are normal. Pupils are equal, round, and reactive to light.   Neck: Normal range of motion. Neck supple.   Cardiovascular: Normal rate and regular rhythm.  Exam reveals no gallop and no friction rub.    Pulmonary/Chest: Effort normal and breath sounds normal.   Abdominal: Soft. Bowel sounds are normal. She exhibits no distension. There is no tenderness.   Musculoskeletal: She exhibits no edema or tenderness.   Neurological: She is alert. No cranial nerve deficit.   Skin: Skin is warm and dry. No rash noted. No erythema.       Labs:        Invalid input(s): [id number]      Recent Labs      07/30/17   0530  07/31/17   0529  08/01/17   0425   SODIUM  134*  136  138   POTASSIUM  4.3  4.8  4.8   CHLORIDE  97  99  98   CO2  24  24  28   BUN  49*  63*  41*   CREATININE  8.91*  10.73*  7.43*   CALCIUM  8.5  9.0  9.2     Recent  Labs      17   0530  17   0529  17   0425   GLUCOSE  86  88  95     Recent Labs      17   0530  17   0529  17   0425   RBC  2.96*  2.88*  3.17*   HEMOGLOBIN  9.3*  9.2*  10.1*   HEMATOCRIT  30.1*  29.3*  32.4*   PLATELETCT  141*  146*  144*     Recent Labs      17   0530  17   0529  17   0425   WBC  8.0  7.5  7.5   NEUTSPOLYS  48.20  48.10  48.10   LYMPHOCYTES  30.20  31.60  33.20   MONOCYTES  14.70*  14.10*  13.90*   EOSINOPHILS  2.40  3.10  1.50   BASOPHILS  0.60  0.70  0.90           Hemodynamics:  Temp (24hrs), Av.4 °C (97.6 °F), Min:35.9 °C (96.7 °F), Max:36.8 °C (98.2 °F)  Temperature: 36.8 °C (98.2 °F)  Pulse  Av.3  Min: 58  Max: 132  Blood Pressure: (!) 99/48 mmHg     Respiratory:    Respiration: 18, Pulse Oximetry: 94 %     Work Of Breathing / Effort: Mild  RUL Breath Sounds: Clear, RML Breath Sounds: Clear, RLL Breath Sounds: Diminished, LASHANDA Breath Sounds: Clear, LLL Breath Sounds: Diminished  Fluids:    Intake/Output Summary (Last 24 hours) at 17 1349  Last data filed at 17 1137   Gross per 24 hour   Intake    358 ml   Output      0 ml   Net    358 ml        GI/Nutrition:  Orders Placed This Encounter   Procedures   • DIET ORDER     Standing Status: Standing      Number of Occurrences: 1      Standing Expiration Date:      Order Specific Question:  Diet:     Answer:  Renal [8]     Medical Decision Making, by Problem:  Active Hospital Problems    Diagnosis   • Sepsis (CMS-HCC) [A41.9]   • Uremic encephalopathy [G93.41, N19]   • Pneumonia [J18.9]   • Acute hypoxemic respiratory failure (CMS-HCC) [J96.01]   • Asterixis [R27.8]   • Anemia [D64.9]   • ESRD (end stage renal disease) (CMS-HCC) [N18.6]   • Leg weakness [R29.898]   • Low back pain [M54.5]   • HTN (hypertension) [I10]   • Lupus (CMS-HCC) [M32.9]   • Medical non-compliance [Z91.19]   • Narcotic dependence (CMS-HCC) [F11.20]     1. End-stage renal disease/HD -MWF  2. Uremia  due to noncompliance with dialysis - better  3. Uremic pericarditis  4. History of lupus nephritis  5. Anemia; Hb stable -on epogen  6. Electrolytes controlled   -HD MWF   -renal diet   -all meds to renal doses   -awaiting chair in dialysis [facility]    Labs reviewed and Medications reviewed

## 2017-08-01 NOTE — CARE PLAN
Problem: Nutritional:  Goal: Achieve adequate nutritional intake  Patient will consume 50% of meals and supplements.   Outcome: PROGRESSING AS EXPECTED  PO improving; taking >50% of most meals.

## 2017-08-01 NOTE — PROGRESS NOTES
Shadia Manrique Fall Risk Assessment:     Last Known Fall: Within the last month  Mobility: Dizziness/generalized weakness  Medications: Cardiovascular or central nervous system meds  Mental Status/LOC/Awareness: Awake, alert, and oriented to date, place, and person  Toileting Needs: No needs  Volume/Electrolyte Status: No problems  Communication/Sensory: Visual (Glasses)/hearing deficit  Behavior: Appropriate behavior  Shadia Manrique Fall Risk Total: 7  Fall Risk Level: LOW RISK    Universal Fall Precautions:  call light/belongings in reach, bed in low position and locked, wheelchairs and assistive devices out of sight, siderails up x 2, use non-slip footwear, adequate lighting, clutter free and spill free environment, educate on level of risk, educate to call for assistance    Fall Risk Level Interventions:   TRIAL ([employer], NEURO, MED GABBIE 5) Low Fall Risk Interventions  Place yellow fall risk ID band on patient: verified  Provide patient/family education based on risk assessment: completed  Educate patient/family to call staff for assistance when getting out of bed: completed  Place fall precaution signage outside patient door: verifiedTRIAL ([employer], NEURO, MED GABBIE 5) Moderate Fall Risk Interventions  Place yellow fall risk ID band on patient: verified  Provide patient/family education based on risk assessment : completed  Educate patient/family to call staff for assistance when getting out of bed: completed  Place fall precaution signage outside patient door: completed  Utilize bed/chair fall alarm: refused     Patient Specific Interventions:     Medication: review medications with patient and family, assess for medications that can be discontinued or dosage decreased, limit combination of prn medications, provide patients who received diuretics/laxatives frequent toileting and assess need for orthostatic hypotension evaluation  Mental Status/LOC/Awareness: reinforce falls education, check on patient hourly,  utilize bed/chair fall alarm, reinforce the use of call light, provide activity and consider lap belt  Toileting: provide frquent toileting, monitor intake and output/use of appropriate interventions, instruct patient/family on the use of grab bars, instruct patient/family on the need to call for assistance when toileting, do not leave patient unattended in bathroom/refer to toileting scripting and toilet prior to giving pain medications  Volume/Electrolyte Status: ensure patient remains hydrated, administer medications as ordered for nausea and vomiting, monitor abnormal lab values and teach patients to dangle before rising if hypotensive  Communication/Sensory: update plan of care on whiteboard, ensure proper positioning when transferrng/ambulating and ensure patient has glasses/contacts and hearing aids/dentures  Behavioral: encourage patient to voice feelings, engage patient in daily activities and administer medication as ordered  Mobility: schedule physical activity throughout the day, provide comfort measures during transport, dangle prior to standing, utilize bed/chair fall alarm, ensure bed is locked and in lowest position, provide appropriate assistive device, instruct patient to exit bed on their strongest side and collaborate with doctor for possible PT/OT consult

## 2017-08-01 NOTE — CARE PLAN
Problem: Safety  Goal: Will remain free from falls  Intervention: Implement fall precautions  Patient educated to call for assistance when needing to ambulate, non-skid socks in place, call light in reach, pt in room close to nursing station.      Problem: Pain Management  Goal: Pain level will decrease to patient’s comfort goal  Intervention: Follow pain managment plan developed in collaboration with patient and Interdisciplinary Team  Patient medicated per MAR for pain management.

## 2017-08-01 NOTE — PROGRESS NOTES
Received report from night RN, assumed care at 0700. Pt A&OX4, able to specify needs. Pt up 1 assist, can be unsteady at times. Pt with right AV fistula, +bruit and thrill. Port to left chest with access. Pt with complaints of pain and nausea, medicated per MAR. Bed alarm refused despite education. Bed in lowest and locked position. POC discussed, communication board updated. Call light in reach, hourly rounding in place.

## 2017-08-01 NOTE — THERAPY
"Occupational Therapy Evaluation completed.   Functional Status:  Pt seen today for OT eval. Pt is at supv level for basic self cares, functional mobility with no AD, and functional txfs. Reviewed home safety, appropriate AE/DME, and services that could help such as meals on wheels. Pt denies any further deficits in ADLs at this time, and does have friends that could help her if needed.   Plan of Care: Patient with no further skilled OT needs in the acute care setting at this time  Discharge Recommendations:  Equipment: Handheld showerhead.  Discharge to home with outpatient or home health for additional skilled therapy services.    See \"Rehab Therapy-Acute\" Patient Summary Report for complete documentation.    "

## 2017-08-01 NOTE — CARE PLAN
Problem: Knowledge Deficit  Goal: Knowledge of disease process/condition, treatment plan, diagnostic tests, and medications will improve  Intervention: Assess knowledge level of disease process/condition, treatment plan, diagnostic tests, and medications  Pt educated on meds and POC.      Problem: Discharge Barriers/Planning  Goal: Patient’s continuum of care needs will be met  Intervention: Collaborate with Transitional Care Team and Interdisciplinary Team to meet discharge needs  Working with CM to establish chair for dialysis

## 2017-08-01 NOTE — PROGRESS NOTES
Shadia Manrique Fall Risk Assessment:     Last Known Fall: Within the last month  Mobility: Dizziness/generalized weakness  Medications: Cardiovascular or central nervous system meds  Mental Status/LOC/Awareness: Awake, alert, and oriented to date, place, and person  Toileting Needs: No needs  Volume/Electrolyte Status: No problems  Communication/Sensory: Visual (Glasses)/hearing deficit  Behavior: Appropriate behavior  Shadia Manrique Fall Risk Total: 7  Fall Risk Level: LOW RISK    Universal Fall Precautions:  call light/belongings in reach, bed in low position and locked, wheelchairs and assistive devices out of sight, siderails up x 2, use non-slip footwear, adequate lighting, clutter free and spill free environment, educate on level of risk, educate to call for assistance    Fall Risk Level Interventions:   TRIAL ([employer], NEURO, MED GABBIE 5) Low Fall Risk Interventions  Place yellow fall risk ID band on patient: verified  Provide patient/family education based on risk assessment: completed  Educate patient/family to call staff for assistance when getting out of bed: completed  Place fall precaution signage outside patient door: verifiedTRIAL ([employer], NEURO, MED GABBIE 5) Moderate Fall Risk Interventions  Place yellow fall risk ID band on patient: verified  Provide patient/family education based on risk assessment : completed  Educate patient/family to call staff for assistance when getting out of bed: completed  Place fall precaution signage outside patient door: completed  Utilize bed/chair fall alarm: refused      Patient Specific Interventions:     Medication: review medications with patient and family, limit combination of prn medications and provide patients who received diuretics/laxatives frequent toileting  Mental Status/LOC/Awareness: reinforce falls education, check on patient hourly, reinforce the use of call light and provide activity  Toileting: provide frquent toileting, monitor intake and output/use  of appropriate interventions, instruct patient/family on the use of grab bars, instruct patient/family on the need to call for assistance when toileting and do not leave patient unattended in bathroom/refer to toileting scripting  Volume/Electrolyte Status: ensure patient remains hydrated, administer medications as ordered for nausea and vomiting, monitor abnormal lab values and teach patients to dangle before rising if hypotensive  Communication/Sensory: update plan of care on whiteboard, ensure proper positioning when transferrng/ambulating, ensure patient has glasses/contacts and hearing aids/dentures and for visually impaired patients orient to their room surrounding and do not change their surroundings  Behavioral: encourage patient to voice feelings, engage patient in daily activities, administer medication as ordered and encourage family to stay with impulsive patients  Mobility: provide comfort measures during transport, dangle prior to standing, ensure bed is locked and in lowest position, provide appropriate assistive device and instruct patient to exit bed on their strongest side

## 2017-08-02 LAB
ANION GAP SERPL CALC-SCNC: 11 MMOL/L (ref 0–11.9)
BUN SERPL-MCNC: 57 MG/DL (ref 8–22)
CALCIUM SERPL-MCNC: 9.2 MG/DL (ref 8.5–10.5)
CHLORIDE SERPL-SCNC: 99 MMOL/L (ref 96–112)
CO2 SERPL-SCNC: 25 MMOL/L (ref 20–33)
CREAT SERPL-MCNC: 9.19 MG/DL (ref 0.5–1.4)
ERYTHROCYTE [DISTWIDTH] IN BLOOD BY AUTOMATED COUNT: 58.9 FL (ref 35.9–50)
GFR SERPL CREATININE-BSD FRML MDRD: 5 ML/MIN/1.73 M 2
GLUCOSE SERPL-MCNC: 85 MG/DL (ref 65–99)
HCT VFR BLD AUTO: 28.8 % (ref 37–47)
HGB BLD-MCNC: 9.2 G/DL (ref 12–16)
MCH RBC QN AUTO: 32.3 PG (ref 27–33)
MCHC RBC AUTO-ENTMCNC: 31.9 G/DL (ref 33.6–35)
MCV RBC AUTO: 101.1 FL (ref 81.4–97.8)
PLATELET # BLD AUTO: 117 K/UL (ref 164–446)
PMV BLD AUTO: 10.6 FL (ref 9–12.9)
POTASSIUM SERPL-SCNC: 5.1 MMOL/L (ref 3.6–5.5)
RBC # BLD AUTO: 2.85 M/UL (ref 4.2–5.4)
SODIUM SERPL-SCNC: 135 MMOL/L (ref 135–145)
WBC # BLD AUTO: 7.1 K/UL (ref 4.8–10.8)

## 2017-08-02 PROCEDURE — 90935 HEMODIALYSIS ONE EVALUATION: CPT

## 2017-08-02 PROCEDURE — 80048 BASIC METABOLIC PNL TOTAL CA: CPT

## 2017-08-02 PROCEDURE — 700111 HCHG RX REV CODE 636 W/ 250 OVERRIDE (IP): Performed by: INTERNAL MEDICINE

## 2017-08-02 PROCEDURE — A9270 NON-COVERED ITEM OR SERVICE: HCPCS | Performed by: FAMILY MEDICINE

## 2017-08-02 PROCEDURE — 700111 HCHG RX REV CODE 636 W/ 250 OVERRIDE (IP)

## 2017-08-02 PROCEDURE — 700102 HCHG RX REV CODE 250 W/ 637 OVERRIDE(OP): Performed by: INTERNAL MEDICINE

## 2017-08-02 PROCEDURE — 302131 K PAD MOTOR: Performed by: HOSPITALIST

## 2017-08-02 PROCEDURE — A9270 NON-COVERED ITEM OR SERVICE: HCPCS | Performed by: HOSPITALIST

## 2017-08-02 PROCEDURE — 700102 HCHG RX REV CODE 250 W/ 637 OVERRIDE(OP): Performed by: HOSPITALIST

## 2017-08-02 PROCEDURE — 99231 SBSQ HOSP IP/OBS SF/LOW 25: CPT | Performed by: HOSPITALIST

## 2017-08-02 PROCEDURE — 302151 K-PAD 14X20: Performed by: HOSPITALIST

## 2017-08-02 PROCEDURE — 770006 HCHG ROOM/CARE - MED/SURG/GYN SEMI*

## 2017-08-02 PROCEDURE — 700102 HCHG RX REV CODE 250 W/ 637 OVERRIDE(OP): Performed by: FAMILY MEDICINE

## 2017-08-02 PROCEDURE — 85027 COMPLETE CBC AUTOMATED: CPT

## 2017-08-02 PROCEDURE — A9270 NON-COVERED ITEM OR SERVICE: HCPCS | Performed by: INTERNAL MEDICINE

## 2017-08-02 RX ORDER — HEPARIN SODIUM 1000 [USP'U]/ML
INJECTION, SOLUTION INTRAVENOUS; SUBCUTANEOUS
Status: COMPLETED
Start: 2017-08-02 | End: 2017-08-02

## 2017-08-02 RX ADMIN — PROMETHAZINE HYDROCHLORIDE 25 MG: 25 TABLET ORAL at 14:16

## 2017-08-02 RX ADMIN — OXYCODONE HYDROCHLORIDE 20 MG: 10 TABLET ORAL at 14:16

## 2017-08-02 RX ADMIN — OXYCODONE HYDROCHLORIDE 20 MG: 10 TABLET ORAL at 01:30

## 2017-08-02 RX ADMIN — OXYCODONE HYDROCHLORIDE 20 MG: 10 TABLET ORAL at 07:46

## 2017-08-02 RX ADMIN — HEPARIN SODIUM 2000 UNITS: 1000 INJECTION, SOLUTION INTRAVENOUS; SUBCUTANEOUS at 08:46

## 2017-08-02 RX ADMIN — OXYCODONE HYDROCHLORIDE 20 MG: 10 TABLET ORAL at 20:26

## 2017-08-02 RX ADMIN — ONDANSETRON 4 MG: 2 INJECTION INTRAMUSCULAR; INTRAVENOUS at 07:45

## 2017-08-02 RX ADMIN — Medication 325 MG: at 07:46

## 2017-08-02 RX ADMIN — PROMETHAZINE HYDROCHLORIDE 25 MG: 25 TABLET ORAL at 20:26

## 2017-08-02 RX ADMIN — HYDROXYCHLOROQUINE SULFATE 200 MG: 200 TABLET, FILM COATED ORAL at 20:27

## 2017-08-02 RX ADMIN — BUPROPION HYDROCHLORIDE 75 MG: 75 TABLET, FILM COATED ORAL at 07:46

## 2017-08-02 RX ADMIN — HEPARIN 500 UNITS: 100 SYRINGE at 21:34

## 2017-08-02 RX ADMIN — HEPARIN 500 UNITS: 100 SYRINGE at 05:58

## 2017-08-02 RX ADMIN — FAMOTIDINE 20 MG: 20 TABLET, FILM COATED ORAL at 07:46

## 2017-08-02 RX ADMIN — Medication 325 MG: at 17:57

## 2017-08-02 RX ADMIN — PREGABALIN 50 MG: 25 CAPSULE ORAL at 07:45

## 2017-08-02 RX ADMIN — TRAZODONE HYDROCHLORIDE 50 MG: 50 TABLET ORAL at 21:27

## 2017-08-02 RX ADMIN — TIZANIDINE 4 MG: 4 TABLET ORAL at 21:27

## 2017-08-02 RX ADMIN — BUPROPION HYDROCHLORIDE 75 MG: 75 TABLET, FILM COATED ORAL at 20:27

## 2017-08-02 ASSESSMENT — ENCOUNTER SYMPTOMS
ABDOMINAL PAIN: 0
HEARTBURN: 0
BLURRED VISION: 0
CHILLS: 0
SENSORY CHANGE: 0
NAUSEA: 0
SHORTNESS OF BREATH: 0
FEVER: 0
VOMITING: 0
SPEECH CHANGE: 0
MYALGIAS: 0
HEMOPTYSIS: 0
SORE THROAT: 0
COUGH: 0
ORTHOPNEA: 0
DIZZINESS: 0
HEADACHES: 0
EYES NEGATIVE: 1
BACK PAIN: 1
FOCAL WEAKNESS: 0
WHEEZING: 0
SEIZURES: 0
PALPITATIONS: 0
WEAKNESS: 0

## 2017-08-02 ASSESSMENT — PAIN SCALES - GENERAL
PAINLEVEL_OUTOF10: 8
PAINLEVEL_OUTOF10: 5
PAINLEVEL_OUTOF10: 8
PAINLEVEL_OUTOF10: 8
PAINLEVEL_OUTOF10: 7
PAINLEVEL_OUTOF10: 9
PAINLEVEL_OUTOF10: 8

## 2017-08-02 NOTE — PROGRESS NOTES
Hemodialysis ordered by Dr. Gotti. Treatment started at 0846 and ended at 1146. Pt stable, vss, no c/o post tx. See flow sheets for details. Net UF 1.5 liters. Report to TRUONG Neil RN. Rt ua avg dressing clean, dry, intact.

## 2017-08-02 NOTE — CARE PLAN
Problem: Fluid Volume:  Goal: Will maintain balanced intake and output  Pt instructed on adequate intake and output, encouraged po intake as tolerated. Pt instructed on fluid balance.

## 2017-08-02 NOTE — CARE PLAN
Problem: Discharge Barriers/Planning  Goal: Patient’s continuum of care needs will be met  Intervention: Assess potential discharge barriers on admission and throughout hospital stay  DC pending dialysis chair placement       Problem: Pain Management  Goal: Pain level will decrease to patient’s comfort goal  Intervention: Follow pain managment plan developed in collaboration with patient and Interdisciplinary Team  Chronic pain managed with prn oxycodone

## 2017-08-02 NOTE — PROGRESS NOTES
Nephrology Progress Note, Adult, Complex               Author: Spike Gotti   Date & Time created: 8/2/2017  12:31 PM     Interval History:  36-year-old female with end-stage renal disease secondary to lupus nephritis, who did not come to the dialysis unit for over a month and came in with scott uremia.   Doing better.  No acute events.  HD MWF    Review of Systems:  Review of Systems   Constitutional: Positive for malaise/fatigue. Negative for fever and chills.   HENT: Negative.    Eyes: Negative.    Respiratory: Negative for cough, hemoptysis, shortness of breath and wheezing.    Cardiovascular: Negative for chest pain, palpitations, orthopnea and leg swelling.   Gastrointestinal: Negative for heartburn, nausea, vomiting and abdominal pain.   Musculoskeletal: Positive for back pain and joint pain. Negative for myalgias.   Skin: Negative.    Neurological: Negative for dizziness, sensory change and focal weakness.       Physical Exam:  Physical Exam   Constitutional: She appears well-developed and well-nourished. No distress.   HENT:   Head: Normocephalic and atraumatic.   Eyes: Conjunctivae and EOM are normal. Pupils are equal, round, and reactive to light.   Neck: Normal range of motion. Neck supple.   Cardiovascular: Normal rate and regular rhythm.  Exam reveals no gallop and no friction rub.    Pulmonary/Chest: Effort normal and breath sounds normal.   Abdominal: Soft. Bowel sounds are normal. She exhibits no distension. There is no tenderness.   Musculoskeletal: She exhibits no edema or tenderness.   Neurological: She is alert. No cranial nerve deficit.   Skin: Skin is warm and dry. No rash noted. No erythema.       Labs:        Invalid input(s): [id number]      Recent Labs      07/31/17   0529  08/01/17   0425  08/02/17   0550   SODIUM  136  138  135   POTASSIUM  4.8  4.8  5.1   CHLORIDE  99  98  99   CO2  24  28  25   BUN  63*  41*  57*   CREATININE  10.73*  7.43*  9.19*   CALCIUM  9.0  9.2  9.2      Recent Labs      17   0529  17   0425  17   0550   GLUCOSE  88  95  85     Recent Labs      17   0529  17   0425  17   0550   RBC  2.88*  3.17*  2.85*   HEMOGLOBIN  9.2*  10.1*  9.2*   HEMATOCRIT  29.3*  32.4*  28.8*   PLATELETCT  146*  144*  117*     Recent Labs      17   0529  17   0425  17   0550   WBC  7.5  7.5  7.1   NEUTSPOLYS  48.10  48.10   --    LYMPHOCYTES  31.60  33.20   --    MONOCYTES  14.10*  13.90*   --    EOSINOPHILS  3.10  1.50   --    BASOPHILS  0.70  0.90   --            Hemodynamics:  Temp (24hrs), Av.6 °C (97.8 °F), Min:36.1 °C (97 °F), Max:37.2 °C (98.9 °F)  Temperature: 36.2 °C (97.1 °F)  Pulse  Av.9  Min: 58  Max: 132  Blood Pressure: (!) 91/64 mmHg     Respiratory:    Respiration: 18, Pulse Oximetry: 96 %        RUL Breath Sounds: Diminished;Clear, RML Breath Sounds: Diminished, RLL Breath Sounds: Diminished, LASHANDA Breath Sounds: Diminished;Clear, LLL Breath Sounds: Diminished  Fluids:    Intake/Output Summary (Last 24 hours) at 17 1231  Last data filed at 17 1140   Gross per 24 hour   Intake    980 ml   Output   2000 ml   Net  -1020 ml        GI/Nutrition:  Orders Placed This Encounter   Procedures   • DIET ORDER     Standing Status: Standing      Number of Occurrences: 1      Standing Expiration Date:      Order Specific Question:  Diet:     Answer:  Renal [8]     Medical Decision Making, by Problem:  Active Hospital Problems    Diagnosis   • Sepsis (CMS-HCC) [A41.9]   • Uremic encephalopathy [G93.41, N19]   • Pneumonia [J18.9]   • Acute hypoxemic respiratory failure (CMS-HCC) [J96.01]   • Asterixis [R27.8]   • Anemia [D64.9]   • ESRD (end stage renal disease) (CMS-HCC) [N18.6]   • Leg weakness [R29.898]   • Low back pain [M54.5]   • HTN (hypertension) [I10]   • Lupus (CMS-HCC) [M32.9]   • Medical non-compliance [Z91.19]   • Narcotic dependence (CMS-HCC) [F11.20]     1. End-stage renal disease/HD -MWF  2. Uremia  due to noncompliance with dialysis - better  3. Uremic pericarditis  4. History of lupus nephritis    -HD MWF  -Await placement    Labs reviewed and Medications reviewed

## 2017-08-02 NOTE — PROGRESS NOTES
Shadia Manrique Fall Risk Assessment:     Last Known Fall: Within the last month  Mobility: Dizziness/generalized weakness  Medications: Cardiovascular or central nervous system meds  Mental Status/LOC/Awareness: Awake, alert, and oriented to date, place, and person  Toileting Needs: No needs  Volume/Electrolyte Status: No problems  Communication/Sensory: Visual (Glasses)/hearing deficit  Behavior: Appropriate behavior  Shadia Manrique Fall Risk Total: 7  Fall Risk Level: LOW RISK    Universal Fall Precautions:  call light/belongings in reach, bed in low position and locked, wheelchairs and assistive devices out of sight, siderails up x 2, use non-slip footwear, adequate lighting, clutter free and spill free environment, educate on level of risk, educate to call for assistance    Fall Risk Level Interventions:   TRIAL ([employer], NEURO, MED GABBIE 5) Low Fall Risk Interventions  Place yellow fall risk ID band on patient: refused  Provide patient/family education based on risk assessment: completed  Educate patient/family to call staff for assistance when getting out of bed: completed  Place fall precaution signage outside patient door: verifiedTRIAL ([employer], NEURO, MED GABBIE 5) Moderate Fall Risk Interventions  Place yellow fall risk ID band on patient: verified  Provide patient/family education based on risk assessment : completed  Educate patient/family to call staff for assistance when getting out of bed: completed  Place fall precaution signage outside patient door: completed  Utilize bed/chair fall alarm: refused     Patient Specific Interventions:     Medication: review medications with patient and family, assess for medications that can be discontinued or dosage decreased and limit combination of prn medications  Mental Status/LOC/Awareness: reinforce falls education, check on patient hourly, reinforce the use of call light and provide activity  Toileting: provide frquent toileting, monitor intake and output/use of  appropriate interventions and do not leave patient unattended in bathroom/refer to toileting scripting  Volume/Electrolyte Status: ensure patient remains hydrated, monitor blood sugars and maintain appropriate blood sugar levels if diabetic, advance diet as tolerated, administer medications as ordered for nausea and vomiting and monitor abnormal lab values  Communication/Sensory: update plan of care on whiteboard, ensure proper positioning when transferrng/ambulating and ensure patient has glasses/contacts and hearing aids/dentures  Behavioral: engage patient in daily activities, administer medication as ordered, instruct/reinforce fall program rationale and encourage family to stay with impulsive patients  Mobility: schedule physical activity throughout the day, dangle prior to standing, utilize bed/chair fall alarm, ensure bed is locked and in lowest position and instruct patient to exit bed on their strongest side

## 2017-08-02 NOTE — PROGRESS NOTES
Shadia Manrique Fall Risk Assessment:     Last Known Fall: Within the last month  Mobility: Dizziness/generalized weakness  Medications: Cardiovascular or central nervous system meds  Mental Status/LOC/Awareness: Awake, alert, and oriented to date, place, and person  Toileting Needs: No needs  Volume/Electrolyte Status: No problems  Communication/Sensory: Visual (Glasses)/hearing deficit  Behavior: Appropriate behavior  Shadia Manrique Fall Risk Total: 7  Fall Risk Level: LOW RISK    Universal Fall Precautions:  call light/belongings in reach, bed in low position and locked, wheelchairs and assistive devices out of sight, siderails up x 2, use non-slip footwear, adequate lighting, clutter free and spill free environment, educate on level of risk, educate to call for assistance    Fall Risk Level Interventions:   TRIAL ([employer], NEURO, MED GABBIE 5) Low Fall Risk Interventions  Place yellow fall risk ID band on patient: refused  Provide patient/family education based on risk assessment: completed  Educate patient/family to call staff for assistance when getting out of bed: completed  Place fall precaution signage outside patient door: verifiedTRIAL ([employer], NEURO, MED GABBIE 5) Moderate Fall Risk Interventions  Place yellow fall risk ID band on patient: verified  Provide patient/family education based on risk assessment : completed  Educate patient/family to call staff for assistance when getting out of bed: completed  Place fall precaution signage outside patient door: completed  Utilize bed/chair fall alarm: refused     Patient Specific Interventions:     Medication: limit combination of prn medications  Mental Status/LOC/Awareness: not applicable  Toileting: not applicable  Volume/Electrolyte Status: not applicable  Communication/Sensory: update plan of care on whiteboard  Behavioral: not applicable  Mobility: ensure bed is locked and in lowest position

## 2017-08-02 NOTE — PROGRESS NOTES
Shadia Manrique Fall Risk Assessment:     Last Known Fall: Within the last month  Mobility: Dizziness/generalized weakness  Medications: Cardiovascular or central nervous system meds  Mental Status/LOC/Awareness: Awake, alert, and oriented to date, place, and person  Toileting Needs: No needs  Volume/Electrolyte Status: No problems  Communication/Sensory: Visual (Glasses)/hearing deficit  Behavior: Appropriate behavior  Shadia Manrique Fall Risk Total: 7  Fall Risk Level: LOW RISK    Universal Fall Precautions:  call light/belongings in reach, bed in low position and locked, wheelchairs and assistive devices out of sight, siderails up x 2, use non-slip footwear, adequate lighting, clutter free and spill free environment, educate on level of risk, educate to call for assistance    Fall Risk Level Interventions:   TRIAL ([employer], NEURO, MED GABBIE 5) Low Fall Risk Interventions  Place yellow fall risk ID band on patient: refused  Provide patient/family education based on risk assessment: completed  Educate patient/family to call staff for assistance when getting out of bed: completed  Place fall precaution signage outside patient door: verifiedTRIAL ([employer], NEURO, MED GABBIE 5) Moderate Fall Risk Interventions  Place yellow fall risk ID band on patient: verified  Provide patient/family education based on risk assessment : completed  Educate patient/family to call staff for assistance when getting out of bed: completed  Place fall precaution signage outside patient door: completed  Utilize bed/chair fall alarm: refused     Patient Specific Interventions:     Medication: {Fall Risk Medication interventions:481512}  Mental Status/LOC/Awareness: {Fall Risk Mental Status/LOC/Awareness Intervention:00534}  Toileting: {Fall Risk Toileting Interventions:830189}  Volume/Electrolyte Status: {Fall Risk Volume/Electrolyte Status Interventions:267767}  Communication/Sensory: {Fall Risk Communication/Sensory  Interventions:467466}  Behavioral: {Fall Risk Behavioral Interventions:271912}  Mobility: {Fall Risk Mobility Interventions:959686}

## 2017-08-03 ENCOUNTER — PATIENT OUTREACH (OUTPATIENT)
Dept: HEALTH INFORMATION MANAGEMENT | Facility: OTHER | Age: 37
End: 2017-08-03

## 2017-08-03 PROCEDURE — 700102 HCHG RX REV CODE 250 W/ 637 OVERRIDE(OP): Performed by: HOSPITALIST

## 2017-08-03 PROCEDURE — 700102 HCHG RX REV CODE 250 W/ 637 OVERRIDE(OP): Performed by: FAMILY MEDICINE

## 2017-08-03 PROCEDURE — 97112 NEUROMUSCULAR REEDUCATION: CPT

## 2017-08-03 PROCEDURE — 99231 SBSQ HOSP IP/OBS SF/LOW 25: CPT | Performed by: HOSPITALIST

## 2017-08-03 PROCEDURE — 700102 HCHG RX REV CODE 250 W/ 637 OVERRIDE(OP): Performed by: INTERNAL MEDICINE

## 2017-08-03 PROCEDURE — A9270 NON-COVERED ITEM OR SERVICE: HCPCS | Performed by: HOSPITALIST

## 2017-08-03 PROCEDURE — A9270 NON-COVERED ITEM OR SERVICE: HCPCS | Performed by: FAMILY MEDICINE

## 2017-08-03 PROCEDURE — 97116 GAIT TRAINING THERAPY: CPT

## 2017-08-03 PROCEDURE — A9270 NON-COVERED ITEM OR SERVICE: HCPCS | Performed by: INTERNAL MEDICINE

## 2017-08-03 PROCEDURE — 700111 HCHG RX REV CODE 636 W/ 250 OVERRIDE (IP): Performed by: INTERNAL MEDICINE

## 2017-08-03 PROCEDURE — 97535 SELF CARE MNGMENT TRAINING: CPT

## 2017-08-03 PROCEDURE — 770006 HCHG ROOM/CARE - MED/SURG/GYN SEMI*

## 2017-08-03 RX ADMIN — Medication 325 MG: at 08:33

## 2017-08-03 RX ADMIN — TRAZODONE HYDROCHLORIDE 50 MG: 50 TABLET ORAL at 21:30

## 2017-08-03 RX ADMIN — HALOPERIDOL LACTATE 2.5 MG: 5 INJECTION, SOLUTION INTRAMUSCULAR at 17:42

## 2017-08-03 RX ADMIN — TIZANIDINE 4 MG: 4 TABLET ORAL at 21:30

## 2017-08-03 RX ADMIN — FAMOTIDINE 20 MG: 20 TABLET, FILM COATED ORAL at 08:33

## 2017-08-03 RX ADMIN — HYDROXYCHLOROQUINE SULFATE 200 MG: 200 TABLET, FILM COATED ORAL at 21:30

## 2017-08-03 RX ADMIN — PROMETHAZINE HYDROCHLORIDE 25 MG: 25 TABLET ORAL at 16:17

## 2017-08-03 RX ADMIN — PREGABALIN 50 MG: 25 CAPSULE ORAL at 08:33

## 2017-08-03 RX ADMIN — BUPROPION HYDROCHLORIDE 75 MG: 75 TABLET, FILM COATED ORAL at 08:31

## 2017-08-03 RX ADMIN — Medication 325 MG: at 17:37

## 2017-08-03 RX ADMIN — PROMETHAZINE HYDROCHLORIDE 25 MG: 25 TABLET ORAL at 02:21

## 2017-08-03 RX ADMIN — OXYCODONE HYDROCHLORIDE 20 MG: 10 TABLET ORAL at 08:33

## 2017-08-03 RX ADMIN — OXYCODONE HYDROCHLORIDE 20 MG: 10 TABLET ORAL at 16:17

## 2017-08-03 RX ADMIN — BUPROPION HYDROCHLORIDE 75 MG: 75 TABLET, FILM COATED ORAL at 21:31

## 2017-08-03 RX ADMIN — OXYCODONE HYDROCHLORIDE 20 MG: 10 TABLET ORAL at 02:21

## 2017-08-03 RX ADMIN — OXYCODONE HYDROCHLORIDE 20 MG: 10 TABLET ORAL at 23:21

## 2017-08-03 ASSESSMENT — COGNITIVE AND FUNCTIONAL STATUS - GENERAL
MOBILITY SCORE: 24
SUGGESTED CMS G CODE MODIFIER MOBILITY: CH

## 2017-08-03 ASSESSMENT — ENCOUNTER SYMPTOMS
SORE THROAT: 0
ORTHOPNEA: 0
ABDOMINAL PAIN: 0
WHEEZING: 0
DIZZINESS: 0
HEARTBURN: 0
BACK PAIN: 1
WEAKNESS: 0
HEADACHES: 0
COUGH: 0
BLURRED VISION: 0
SPEECH CHANGE: 0
SEIZURES: 0
FEVER: 0

## 2017-08-03 ASSESSMENT — GAIT ASSESSMENTS
DISTANCE (FEET): 350
GAIT LEVEL OF ASSIST: SUPERVISED
DEVIATION: DECREASED BASE OF SUPPORT

## 2017-08-03 ASSESSMENT — PAIN SCALES - GENERAL
PAINLEVEL_OUTOF10: 5
PAINLEVEL_OUTOF10: 5
PAINLEVEL_OUTOF10: 8
PAINLEVEL_OUTOF10: 7

## 2017-08-03 NOTE — PROGRESS NOTES
· Chart reviewed.  Pt is scheduled for care manager telephone visit today 8/3/17 at 2:00 PM, however currently admitted to [facility].  Placed phone call to Shakila in scheduling to request that pt's care manager telephone visit and discharge clinic appt be canceled and/or rescheduled.

## 2017-08-03 NOTE — PROGRESS NOTES
[facility] Hospitalist Progress Note    Date of Service: 8/3/2017    Chief Complaint  36 y.o. female admitted 2017 with Uremic Encephalopathy, Respiratory failure, pulmonary edema secondary to volume overload secondary to noncompliance with HD for ESRD with Lupus nephritis. Noted to have right leg weakness with chronic back pain.     Interval Problem Update        No new issues    Awaiting dialysis chair    .      Consultants/Specialty  Nephrology    Disposition  Await HD chair        Review of Systems   Constitutional: Positive for malaise/fatigue. Negative for fever.   HENT: Negative for sore throat.    Eyes: Negative for blurred vision.   Respiratory: Negative for cough and wheezing.    Cardiovascular: Negative for chest pain and orthopnea.   Gastrointestinal: Negative for heartburn and abdominal pain.   Musculoskeletal: Positive for back pain.   Neurological: Negative for dizziness, speech change, seizures, weakness and headaches.   All other systems reviewed and are negative.     Physical Exam  Laboratory/Imaging   Hemodynamics  Temp (24hrs), Av.8 °C (98.2 °F), Min:36.2 °C (97.1 °F), Max:37.2 °C (99 °F)   Temperature: 36.7 °C (98 °F)  Pulse  Av.6  Min: 58  Max: 132   Blood Pressure: 115/57 mmHg      Respiratory      Respiration: 16, Pulse Oximetry: 99 %     Work Of Breathing / Effort: Mild  RUL Breath Sounds: Clear, RML Breath Sounds: Diminished, RLL Breath Sounds: Diminished, LASHANDA Breath Sounds: Clear, LLL Breath Sounds: Diminished    Fluids    Intake/Output Summary (Last 24 hours) at 17 1218  Last data filed at 17 0900   Gross per 24 hour   Intake    600 ml   Output      0 ml   Net    600 ml       Nutrition  Orders Placed This Encounter   Procedures   • DIET ORDER     Standing Status: Standing      Number of Occurrences: 1      Standing Expiration Date:      Order Specific Question:  Diet:     Answer:  Renal [8]     Physical Exam   Constitutional: She appears well-developed and  well-nourished.   HENT:   Head: Normocephalic and atraumatic.   Eyes: Conjunctivae are normal. Pupils are equal, round, and reactive to light.   Neck: Neck supple.   Cardiovascular: Normal rate and regular rhythm.    Pulmonary/Chest: Effort normal. No respiratory distress. She has no wheezes.   Abdominal: Soft. She exhibits no distension.   Neurological: She is alert.   MMT BUE ad LLE 5/5, RLE 4+/5   Skin: Skin is warm and dry.   Nursing note and vitals reviewed.      Recent Labs      08/01/17   0425  08/02/17   0550   WBC  7.5  7.1   RBC  3.17*  2.85*   HEMOGLOBIN  10.1*  9.2*   HEMATOCRIT  32.4*  28.8*   MCV  102.2*  101.1*   MCH  31.9  32.3   MCHC  31.2*  31.9*   RDW  59.5*  58.9*   PLATELETCT  144*  117*   MPV  10.7  10.6     Recent Labs      08/01/17   0425  08/02/17   0550   SODIUM  138  135   POTASSIUM  4.8  5.1   CHLORIDE  98  99   CO2  28  25   GLUCOSE  95  85   BUN  41*  57*   CREATININE  7.43*  9.19*   CALCIUM  9.2  9.2                      Assessment/Plan     Medical non-compliance (present on admission)  Assessment & Plan  Counseling    Uremic encephalopathy (present on admission)  Assessment & Plan  resolving    ESRD (end stage renal disease) (CMS-HCC) (present on admission)  Assessment & Plan  HD M-W-F, HD today  Awaiting for HD chair at [facility]    HTN (hypertension) (present on admission)  Assessment & Plan  Norvasc, Coreg  Both stopped    Lupus (CMS-HCC) (present on admission)  Assessment & Plan  Plaquenil, Prednisone    Acute hypoxemic respiratory failure (CMS-HCC) (present on admission)  Assessment & Plan  Off O2     Iron deficiency (present on admission)  Assessment & Plan  FeSO4    Hypokalemia  Assessment & Plan  Follow bmp    Narcotic dependence (CMS-HCC) (present on admission)  Assessment & Plan  Only outpatient pain meds    Anemia (present on admission)  Assessment & Plan  transfused PRBC, follow cbc, Epogen?    Leg weakness (present on admission)  Assessment & Plan  MRI spine: no acute  finding  PT/OT    Low back pain (present on admission)  Assessment & Plan  Lyrica, Tizanidine    Depression  Assessment & Plan  Wellbutrin and Trazodone       Labs reviewed, Medications reviewed and Radiology images reviewed  Taylor catheter: No Taylor        DVT prophylaxis - mechanical: SCDs  Ulcer prophylaxis: Yes

## 2017-08-03 NOTE — PROGRESS NOTES
Received bedside report from day RN and assumed care of patient at 1900. Labs and orders noted. Assessment performed. Patient is alert and oriented x4 with no signs of labored breathing or distress. Patient updated on plan of care; verbalizes understanding. Safety precautions in place including patient call light within reach, bed alarm refused despite education, personal possessions nearby, bed in low position and locked, hourly rounding in practice, and non-skid socks in place.

## 2017-08-03 NOTE — THERAPY
"Pt conts to improve MOD I all activitys no AD, able to manage climbing 4 flights of stairs MOD I using single handrail, side /back stepping, start stops from rapid gait speed w/o LOB. Pt sat EOB post TX and will benifit from acute post acute rehab...Physical Therapy Treatment completed.   Bed Mobility:  Supine to Sit: Modified Independent  Transfers: Sit to Stand: Modified Independent  Gait: Level Of Assist: Supervised with No Equipment Needed       Plan of Care: Will benefit from Physical Therapy 2 times per week  Discharge Recommendations: Equipment: No Equipment Needed. Post-acute therapy Discharge to home with outpatient or home health for additional skilled therapy services.     See \"Rehab Therapy-Acute\" Patient Summary Report for complete documentation.       "

## 2017-08-03 NOTE — PROGRESS NOTES
"Assumed care of patient at 0700. Received report from RN. Patient is AOX  4. Assessment complete. Labs reviewed.Patient and RN discussed plan of care. Patient questions answered. Patient needs are met at this time. Bed in lowest and locked position. Upper bed rails up.  Call light is within reach. Hourly rounding in place.  /57 mmHg  Pulse 71  Temp(Src) 36.7 °C (98 °F)  Resp 16  Ht 1.651 m (5' 5\")  Wt 77 kg (169 lb 12.1 oz)  BMI 28.25 kg/m2  SpO2 99%  Breastfeeding? No    "

## 2017-08-03 NOTE — PROGRESS NOTES
Shadia Manrique Fall Risk Assessment:     Last Known Fall: Within the last month  Mobility: Dizziness/generalized weakness  Medications: Cardiovascular or central nervous system meds  Mental Status/LOC/Awareness: Awake, alert, and oriented to date, place, and person  Toileting Needs: No needs  Volume/Electrolyte Status: No problems  Communication/Sensory: Visual (Glasses)/hearing deficit  Behavior: Appropriate behavior  Shadia Manrique Fall Risk Total: 7  Fall Risk Level: LOW RISK    Universal Fall Precautions:  call light/belongings in reach, bed in low position and locked, wheelchairs and assistive devices out of sight, use non-slip footwear, siderails up x 2, adequate lighting, clutter free and spill free environment, educate on level of risk, educate to call for assistance    Fall Risk Level Interventions:   TRIAL ([employer], NEURO, MED GABBIE 5) Low Fall Risk Interventions  Place yellow fall risk ID band on patient: refused  Provide patient/family education based on risk assessment: completed  Educate patient/family to call staff for assistance when getting out of bed: completed  Place fall precaution signage outside patient door: verifiedTRIAL ([employer], NEURO, MED GABBIE 5) Moderate Fall Risk Interventions  Place yellow fall risk ID band on patient: verified  Provide patient/family education based on risk assessment : completed  Educate patient/family to call staff for assistance when getting out of bed: completed  Place fall precaution signage outside patient door: completed  Utilize bed/chair fall alarm: refused     Patient Specific Interventions:     Medication: review medications with patient and family, assess for medications that can be discontinued or dosage decreased and limit combination of prn medications  Mental Status/LOC/Awareness: reinforce the use of call light  Toileting: instruct patient/family on the use of grab bars, instruct patient/family on the need to call for assistance when toileting and do not  leave patient unattended in bathroom/refer to toileting scripting  Volume/Electrolyte Status: ensure patient remains hydrated and administer medications as ordered for nausea and vomiting  Communication/Sensory: update plan of care on whiteboard and ensure patient has glasses/contacts and hearing aids/dentures  Behavioral: encourage patient to voice feelings  Mobility: ensure bed is locked and in lowest position, provide appropriate assistive device and instruct patient to exit bed on their strongest side

## 2017-08-03 NOTE — PROGRESS NOTES
[facility] Hospitalist Progress Note    Date of Service: 2017    Chief Complaint  36 y.o. female admitted 2017 with Uremic Encephalopathy, Respiratory failure, pulmonary edema secondary to volume overload secondary to noncompliance with HD for ESRD with Lupus nephritis. Noted to have right leg weakness with chronic back pain.     Interval Problem Update        No new issues    Awaiting dialysis chair    .      Consultants/Specialty  Nephrology    Disposition  Await HD chair        Review of Systems   Constitutional: Positive for malaise/fatigue. Negative for fever.   HENT: Negative for sore throat.    Eyes: Negative for blurred vision.   Respiratory: Negative for cough and wheezing.    Cardiovascular: Negative for chest pain and orthopnea.   Gastrointestinal: Negative for heartburn and abdominal pain.   Musculoskeletal: Positive for back pain.   Neurological: Negative for dizziness, speech change, seizures, weakness and headaches.   All other systems reviewed and are negative.     Physical Exam  Laboratory/Imaging   Hemodynamics  Temp (24hrs), Av.8 °C (98.2 °F), Min:36.1 °C (97 °F), Max:37.2 °C (99 °F)   Temperature: 37.2 °C (99 °F)  Pulse  Av.9  Min: 58  Max: 132   Blood Pressure: (!) 99/49 mmHg      Respiratory      Respiration: 17, Pulse Oximetry: 97 %        RUL Breath Sounds: Diminished;Clear, RML Breath Sounds: Diminished, RLL Breath Sounds: Diminished, LASHANDA Breath Sounds: Diminished;Clear, LLL Breath Sounds: Diminished    Fluids    Intake/Output Summary (Last 24 hours) at 17  Last data filed at 17 1300   Gross per 24 hour   Intake    980 ml   Output   2000 ml   Net  -1020 ml       Nutrition  Orders Placed This Encounter   Procedures   • DIET ORDER     Standing Status: Standing      Number of Occurrences: 1      Standing Expiration Date:      Order Specific Question:  Diet:     Answer:  Renal [8]     Physical Exam   Constitutional: She appears well-developed and well-nourished.    HENT:   Head: Normocephalic and atraumatic.   Eyes: Conjunctivae are normal. Pupils are equal, round, and reactive to light.   Neck: Neck supple.   Cardiovascular: Normal rate and regular rhythm.    Pulmonary/Chest: Effort normal. No respiratory distress. She has no wheezes.   Abdominal: Soft. She exhibits no distension.   Neurological: She is alert.   MMT BUE ad LLE 5/5, RLE 4+/5   Skin: Skin is warm and dry.   Nursing note and vitals reviewed.      Recent Labs      07/31/17   0529  08/01/17   0425  08/02/17   0550   WBC  7.5  7.5  7.1   RBC  2.88*  3.17*  2.85*   HEMOGLOBIN  9.2*  10.1*  9.2*   HEMATOCRIT  29.3*  32.4*  28.8*   MCV  101.7*  102.2*  101.1*   MCH  31.9  31.9  32.3   MCHC  31.4*  31.2*  31.9*   RDW  54.1*  59.5*  58.9*   PLATELETCT  146*  144*  117*   MPV  10.8  10.7  10.6     Recent Labs      07/31/17   0529  08/01/17   0425  08/02/17   0550   SODIUM  136  138  135   POTASSIUM  4.8  4.8  5.1   CHLORIDE  99  98  99   CO2  24  28  25   GLUCOSE  88  95  85   BUN  63*  41*  57*   CREATININE  10.73*  7.43*  9.19*   CALCIUM  9.0  9.2  9.2                      Assessment/Plan     Medical non-compliance (present on admission)  Assessment & Plan  Counseling    Uremic encephalopathy (present on admission)  Assessment & Plan  resolving    ESRD (end stage renal disease) (CMS-HCC) (present on admission)  Assessment & Plan  HD M-W-F, HD today  Awaiting for HD chair at [facility]    HTN (hypertension) (present on admission)  Assessment & Plan  Hold Norvasc, Coreg    Lupus (CMS-HCC) (present on admission)  Assessment & Plan  Plaquenil, Prednisone    Acute hypoxemic respiratory failure (CMS-HCC) (present on admission)  Assessment & Plan  Off O2     Iron deficiency  Assessment & Plan  FeSO4    Hypokalemia  Assessment & Plan  Follow bmp    Narcotic dependence (CMS-HCC) (present on admission)  Assessment & Plan  Only outpatient pain meds    Anemia (present on admission)  Assessment & Plan  transfused PRBC, follow cbc,  Epogen?    Leg weakness (present on admission)  Assessment & Plan  MRI spine: no acute finding  PT/OT    Low back pain (present on admission)  Assessment & Plan  Lyrica, Tizanidine    Depression  Assessment & Plan  Wellbutrin and Trazodone       Labs reviewed, Medications reviewed and Radiology images reviewed  Taylor catheter: No Taylor        DVT prophylaxis - mechanical: SCDs  Ulcer prophylaxis: Yes

## 2017-08-03 NOTE — PROGRESS NOTES
Shadia Manrique Fall Risk Assessment:     Last Known Fall: Within the last month  Mobility: Dizziness/generalized weakness  Medications: Cardiovascular or central nervous system meds  Mental Status/LOC/Awareness: Awake, alert, and oriented to date, place, and person  Toileting Needs: No needs  Volume/Electrolyte Status: No problems  Communication/Sensory: Visual (Glasses)/hearing deficit  Behavior: Appropriate behavior  Shadia Manrique Fall Risk Total: 7  Fall Risk Level: LOW RISK    Universal Fall Precautions:  call light/belongings in reach, bed in low position and locked, siderails up x 2    Fall Risk Level Interventions:   TRIAL ([employer], NEURO, MED GABBIE 5) Low Fall Risk Interventions  Place yellow fall risk ID band on patient: refused  Provide patient/family education based on risk assessment: completed  Educate patient/family to call staff for assistance when getting out of bed: completed  Place fall precaution signage outside patient door: verifiedTRIAL ([employer], NEURO, MED GABBIE 5) Moderate Fall Risk Interventions  Place yellow fall risk ID band on patient: verified  Provide patient/family education based on risk assessment : completed  Educate patient/family to call staff for assistance when getting out of bed: completed  Place fall precaution signage outside patient door: completed  Utilize bed/chair fall alarm: refused     Patient Specific Interventions:     Medication: review medications with patient and family, assess for medications that can be discontinued or dosage decreased and assess need for orthostatic hypotension evaluation  Mental Status/LOC/Awareness: reorient patient, reinforce falls education, check on patient hourly, utilize bed/chair fall alarm, reinforce the use of call light and provide activity  Toileting: provide frquent toileting, do not leave patient unattended in bathroom/refer to toileting scripting and toilet prior to giving pain medications  Volume/Electrolyte Status: ensure patient  remains hydrated and advance diet as tolerated  Communication/Sensory: update plan of care on whiteboard and ensure proper positioning when transferrng/ambulating  Behavioral: encourage patient to voice feelings, engage patient in daily activities, administer medication as ordered and instruct/reinforce fall program rationale  Mobility: schedule physical activity throughout the day, utilize bed/chair fall alarm, ensure bed is locked and in lowest position and instruct patient to exit bed on their strongest side

## 2017-08-04 VITALS
HEIGHT: 65 IN | OXYGEN SATURATION: 92 % | SYSTOLIC BLOOD PRESSURE: 98 MMHG | RESPIRATION RATE: 17 BRPM | DIASTOLIC BLOOD PRESSURE: 54 MMHG | WEIGHT: 169.75 LBS | TEMPERATURE: 97.1 F | HEART RATE: 88 BPM | BODY MASS INDEX: 28.28 KG/M2

## 2017-08-04 PROBLEM — J96.01 ACUTE HYPOXEMIC RESPIRATORY FAILURE (HCC): Status: RESOLVED | Noted: 2017-07-19 | Resolved: 2017-08-04

## 2017-08-04 PROBLEM — E87.6 HYPOKALEMIA: Status: RESOLVED | Noted: 2017-07-25 | Resolved: 2017-08-04

## 2017-08-04 PROBLEM — N19 UREMIC ENCEPHALOPATHY: Status: RESOLVED | Noted: 2017-07-19 | Resolved: 2017-08-04

## 2017-08-04 PROBLEM — G93.49 UREMIC ENCEPHALOPATHY: Status: RESOLVED | Noted: 2017-07-19 | Resolved: 2017-08-04

## 2017-08-04 PROBLEM — I10 HTN (HYPERTENSION): Status: RESOLVED | Noted: 2017-07-19 | Resolved: 2017-08-04

## 2017-08-04 PROCEDURE — A9270 NON-COVERED ITEM OR SERVICE: HCPCS | Performed by: INTERNAL MEDICINE

## 2017-08-04 PROCEDURE — 700102 HCHG RX REV CODE 250 W/ 637 OVERRIDE(OP): Performed by: FAMILY MEDICINE

## 2017-08-04 PROCEDURE — 700111 HCHG RX REV CODE 636 W/ 250 OVERRIDE (IP): Performed by: INTERNAL MEDICINE

## 2017-08-04 PROCEDURE — A9270 NON-COVERED ITEM OR SERVICE: HCPCS | Performed by: HOSPITALIST

## 2017-08-04 PROCEDURE — A9270 NON-COVERED ITEM OR SERVICE: HCPCS | Performed by: FAMILY MEDICINE

## 2017-08-04 PROCEDURE — 90935 HEMODIALYSIS ONE EVALUATION: CPT

## 2017-08-04 PROCEDURE — 99239 HOSP IP/OBS DSCHRG MGMT >30: CPT | Performed by: HOSPITALIST

## 2017-08-04 PROCEDURE — 700111 HCHG RX REV CODE 636 W/ 250 OVERRIDE (IP)

## 2017-08-04 PROCEDURE — 700102 HCHG RX REV CODE 250 W/ 637 OVERRIDE(OP): Performed by: HOSPITALIST

## 2017-08-04 PROCEDURE — 700102 HCHG RX REV CODE 250 W/ 637 OVERRIDE(OP): Performed by: INTERNAL MEDICINE

## 2017-08-04 RX ORDER — HEPARIN SODIUM 1000 [USP'U]/ML
INJECTION, SOLUTION INTRAVENOUS; SUBCUTANEOUS
Status: COMPLETED
Start: 2017-08-04 | End: 2017-08-04

## 2017-08-04 RX ORDER — OXYCODONE HYDROCHLORIDE 20 MG/1
20 TABLET ORAL EVERY 6 HOURS PRN
Qty: 28 TAB | Refills: 0 | Status: SHIPPED | OUTPATIENT
Start: 2017-08-04 | End: 2017-11-21

## 2017-08-04 RX ADMIN — OXYCODONE HYDROCHLORIDE 20 MG: 10 TABLET ORAL at 06:12

## 2017-08-04 RX ADMIN — HALOPERIDOL LACTATE 5 MG: 5 INJECTION, SOLUTION INTRAMUSCULAR at 07:29

## 2017-08-04 RX ADMIN — Medication 325 MG: at 07:40

## 2017-08-04 RX ADMIN — HEPARIN 500 UNITS: 100 SYRINGE at 12:28

## 2017-08-04 RX ADMIN — HEPARIN SODIUM 2000 UNITS: 1000 INJECTION, SOLUTION INTRAVENOUS; SUBCUTANEOUS at 08:45

## 2017-08-04 RX ADMIN — STANDARDIZED SENNA CONCENTRATE AND DOCUSATE SODIUM 2 TABLET: 8.6; 5 TABLET, FILM COATED ORAL at 07:40

## 2017-08-04 RX ADMIN — BUPROPION HYDROCHLORIDE 75 MG: 75 TABLET, FILM COATED ORAL at 07:40

## 2017-08-04 RX ADMIN — PREGABALIN 50 MG: 25 CAPSULE ORAL at 07:41

## 2017-08-04 RX ADMIN — FAMOTIDINE 20 MG: 20 TABLET, FILM COATED ORAL at 07:40

## 2017-08-04 RX ADMIN — PROMETHAZINE HYDROCHLORIDE 25 MG: 25 TABLET ORAL at 06:15

## 2017-08-04 ASSESSMENT — ENCOUNTER SYMPTOMS
BLURRED VISION: 0
HEADACHES: 0
ORTHOPNEA: 0
SORE THROAT: 0
DIZZINESS: 0
BACK PAIN: 1
WHEEZING: 0
ABDOMINAL PAIN: 0
SEIZURES: 0
HEARTBURN: 0
WEAKNESS: 0
COUGH: 0
SPEECH CHANGE: 0
FEVER: 0

## 2017-08-04 ASSESSMENT — PAIN SCALES - GENERAL
PAINLEVEL_OUTOF10: 5
PAINLEVEL_OUTOF10: 8

## 2017-08-04 NOTE — PROGRESS NOTES
Assumed care of pt after receiving report from dayshift RN. Bedside rounding completed w/ dayshift RN. Pt resting in bed A&OX4, pt medicated for pain per MAR, pt is upself, Fall measures in place, call light within reach, personal belongings nearby, bed in lowest position, and hourly rounding in place. Will continue to monitor pt.

## 2017-08-04 NOTE — PROGRESS NOTES
Dc instructions given, home meds from safekeeping given to pt, ok to dc home per SW in terms of HD outpatient set up.

## 2017-08-04 NOTE — PROGRESS NOTES
Shadia Mnarique Fall Risk Assessment:     Last Known Fall: Within the last month  Mobility: Dizziness/generalized weakness  Medications: Cardiovascular or central nervous system meds  Mental Status/LOC/Awareness: Awake, alert, and oriented to date, place, and person  Toileting Needs: No needs  Volume/Electrolyte Status: No problems  Communication/Sensory: Visual (Glasses)/hearing deficit  Behavior: Appropriate behavior  Shadia Manrique Fall Risk Total: 7  Fall Risk Level: LOW RISK    Universal Fall Precautions:  call light/belongings in reach, bed in low position and locked    Fall Risk Level Interventions:   TRIAL ([employer] 8, NEURO, MED GABBIE 5) Low Fall Risk Interventions  Place yellow fall risk ID band on patient: refused  Provide patient/family education based on risk assessment: completed  Educate patient/family to call staff for assistance when getting out of bed: completed  Place fall precaution signage outside patient door: verifiedTRIAL ([employer] 8, NEURO, MED GABBIE 5) Moderate Fall Risk Interventions  Place yellow fall risk ID band on patient: verified  Provide patient/family education based on risk assessment : completed  Educate patient/family to call staff for assistance when getting out of bed: completed  Place fall precaution signage outside patient door: completed  Utilize bed/chair fall alarm: refused     Patient Specific Interventions:     Medication: review medications with patient and family and assess for medications that can be discontinued or dosage decreased  Mental Status/LOC/Awareness: reorient patient and reinforce falls education  Toileting: monitor intake and output/use of appropriate interventions  Volume/Electrolyte Status: ensure patient remains hydrated and monitor abnormal lab values  Communication/Sensory: update plan of care on whiteboard  Behavioral: encourage patient to voice feelings  Mobility: dangle prior to standing

## 2017-08-04 NOTE — PROGRESS NOTES
HEMODIALYSIS NOTES:     HD today x 3 hours per Dr. Gotti. Initiated at 0842 and ended at 1144. Patient stable, denies pain, No SOB. Low SBP on the 2nd hour of tx. Asymptomatic. Patient tolerated treatment. Please see paper flowsheet for details.    UF Net: 2,100 mL    Blood returned. Applied gauze and held R AVG site for 10 minutes. Verified no bleeding. Bruit and thrill present post dialysis. Instructions given to Primary RN that if bleeding occurs on the AVF site, change dressing and held the site with pressure. Report given to E. Delos Reyes RN.

## 2017-08-04 NOTE — CARE PLAN
Problem: Nutritional:  Goal: Achieve adequate nutritional intake  Patient will consume 50% of meals and supplements.   Outcome: MET Date Met:  08/04/17

## 2017-08-04 NOTE — CARE PLAN
Problem: Knowledge Deficit  Goal: Knowledge of disease process/condition, treatment plan, diagnostic tests, and medications will improve  Intervention: Explain information regarding disease process/condition, treatment plan, diagnostic tests, and medications and document in education  Awaiting OP HD      Problem: Pain Management  Goal: Pain level will decrease to patient’s comfort goal  Intervention: Follow pain managment plan developed in collaboration with patient and Interdisciplinary Team  Prn meds MAR

## 2017-08-04 NOTE — DISCHARGE PLANNING
KLAUDIA spoke with Daphne Lock with pt pathways.  Daphne set pt up with [facility] on a MWF @ 1500.  Per Daphne, if pt is able to show compliance over the next 6 weeks then, [facility] may consider pt transferring.  Daphne stated that she will speak with pt.  KLAUDIA updated Dr. Lima.

## 2017-08-04 NOTE — DISCHARGE INSTRUCTIONS
Discharge Instructions    Discharged to home by car with relative. Discharged via wheelchair, hospital escort: Yes.  Special equipment needed: Wheelchair    Be sure to schedule a follow-up appointment with your primary care doctor or any specialists as instructed.     Discharge Plan:   Influenza Vaccine Indication: Patient Refuses    I understand that a diet low in cholesterol, fat, and sodium is recommended for good health. Unless I have been given specific instructions below for another diet, I accept this instruction as my diet prescription.   Other diet: Renal low potassium diet    Special Instructions:   Outpatient Dialysis set up per  with [facility] at 3 PM MWF  Follow up with MD as instructed    · Is patient discharged on Warfarin / Coumadin?   No     · Is patient Post Blood Transfusion?  No    Depression / Suicide Risk    As you are discharged from this [facility] facility, it is important to learn how to keep safe from harming yourself.    Recognize the warning signs:  · Abrupt changes in personality, positive or negative- including increase in energy   · Giving away possessions  · Change in eating patterns- significant weight changes-  positive or negative  · Change in sleeping patterns- unable to sleep or sleeping all the time   · Unwillingness or inability to communicate  · Depression  · Unusual sadness, discouragement and loneliness  · Talk of wanting to die  · Neglect of personal appearance   · Rebelliousness- reckless behavior  · Withdrawal from people/activities they love  · Confusion- inability to concentrate     If you or a loved one observes any of these behaviors or has concerns about self-harm, here's what you can do:  · Talk about it- your feelings and reasons for harming yourself  · Remove any means that you might use to hurt yourself (examples: pills, rope, extension cords, firearm)  · Get professional help from the community (Mental Health, Substance Abuse,  psychological counseling)  · Do not be alone:Call your Safe Contact- someone whom you trust who will be there for you.  · Call your local CRISIS HOTLINE 681-2606 or 824-549-7365  · Call your local Children's Mobile Crisis Response Team [place] (938) 842-5923 or www.[employer]  · Call the toll free National Suicide Prevention Hotlines   · National Suicide Prevention Lifeline 935-322-WWQM (1958)  · National Hope Line Network 800-SUICIDE (156-3885)

## 2017-08-04 NOTE — PROGRESS NOTES
[facility] Hospitalist Progress Note    Date of Service: 2017    Chief Complaint  36 y.o. female admitted 2017 with Uremic Encephalopathy, Respiratory failure, pulmonary edema secondary to volume overload secondary to noncompliance with HD for ESRD with Lupus nephritis. Noted to have right leg weakness with chronic back pain.     Interval Problem Update        No new issues    Awaiting dialysis chair      Case d/w renal MD  .      Consultants/Specialty  Nephrology    Disposition  Await HD chair        Review of Systems   Constitutional: Positive for malaise/fatigue. Negative for fever.   HENT: Negative for sore throat.    Eyes: Negative for blurred vision.   Respiratory: Negative for cough and wheezing.    Cardiovascular: Negative for chest pain and orthopnea.   Gastrointestinal: Negative for heartburn and abdominal pain.   Musculoskeletal: Positive for back pain.   Neurological: Negative for dizziness, speech change, seizures, weakness and headaches.   All other systems reviewed and are negative.     Physical Exam  Laboratory/Imaging   Hemodynamics  Temp (24hrs), Av.6 °C (97.8 °F), Min:36.3 °C (97.4 °F), Max:36.9 °C (98.5 °F)   Temperature: 36.3 °C (97.4 °F)  Pulse  Av  Min: 58  Max: 132   Blood Pressure: 106/64 mmHg      Respiratory      Respiration: 16, Pulse Oximetry: 92 %     Work Of Breathing / Effort: Mild  RUL Breath Sounds: Clear, RML Breath Sounds: Diminished, RLL Breath Sounds: Diminished, LASHANDA Breath Sounds: Clear, LLL Breath Sounds: Diminished    Fluids    Intake/Output Summary (Last 24 hours) at 17 1016  Last data filed at 17 1300   Gross per 24 hour   Intake    240 ml   Output      0 ml   Net    240 ml       Nutrition  Orders Placed This Encounter   Procedures   • DIET ORDER     Standing Status: Standing      Number of Occurrences: 1      Standing Expiration Date:      Order Specific Question:  Diet:     Answer:  Renal [8]     Physical Exam   Constitutional: She appears  well-developed and well-nourished.   HENT:   Head: Normocephalic and atraumatic.   Eyes: Conjunctivae are normal. Pupils are equal, round, and reactive to light.   Neck: Neck supple.   Cardiovascular: Normal rate and regular rhythm.    Pulmonary/Chest: Effort normal. No respiratory distress. She has no wheezes.   Abdominal: Soft. She exhibits no distension.   Neurological: She is alert.   MMT BUE ad LLE 5/5, RLE 4+/5   Skin: Skin is warm and dry.   Nursing note and vitals reviewed.      Recent Labs      08/02/17   0550   WBC  7.1   RBC  2.85*   HEMOGLOBIN  9.2*   HEMATOCRIT  28.8*   MCV  101.1*   MCH  32.3   MCHC  31.9*   RDW  58.9*   PLATELETCT  117*   MPV  10.6     Recent Labs      08/02/17   0550   SODIUM  135   POTASSIUM  5.1   CHLORIDE  99   CO2  25   GLUCOSE  85   BUN  57*   CREATININE  9.19*   CALCIUM  9.2                      Assessment/Plan     Medical non-compliance (present on admission)  Assessment & Plan  Counseling    Uremic encephalopathy (present on admission)  Assessment & Plan  resolving    ESRD (end stage renal disease) (CMS-HCC) (present on admission)  Assessment & Plan  HD M-W-F, HD today  Awaiting for [facility]    HTN (hypertension) (present on admission)  Assessment & Plan  Norvasc, Coreg  Both stopped    Lupus (CMS-HCC) (present on admission)  Assessment & Plan  Plaquenil, Prednisone    Acute hypoxemic respiratory failure (CMS-HCC) (present on admission)  Assessment & Plan  Off O2     Iron deficiency (present on admission)  Assessment & Plan  FeSO4    Hypokalemia  Assessment & Plan  Follow bmp    Narcotic dependence (CMS-HCC) (present on admission)  Assessment & Plan  Only outpatient pain meds    Anemia (present on admission)  Assessment & Plan  transfused PRBC, follow cbc, Epogen?    Leg weakness (present on admission)  Assessment & Plan  MRI spine: no acute finding  PT/OT    Low back pain (present on admission)  Assessment & Plan  Lyrica, Tizanidine    Depression  Assessment &  Plan  Wellbutrin and Trazodone       Labs reviewed, Medications reviewed and Radiology images reviewed  Taylor catheter: No Taylor        DVT prophylaxis - mechanical: SCDs  Ulcer prophylaxis: Yes

## 2017-08-04 NOTE — CARE PLAN
Problem: Safety  Goal: Will remain free from injury  Outcome: PROGRESSING AS EXPECTED  Bed in lowest position, call light within reach. Bed alarm on. Patient educated regarding safety precautions. Room free of clutter.     Problem: Psychosocial Needs:  Goal: Level of anxiety will decrease  Outcome: PROGRESSING AS EXPECTED  Established therapeutic relationship with patient (validation, silence). Deep breathing used to decrease anxiety.

## 2017-08-05 NOTE — DISCHARGE SUMMARY
CHIEF COMPLAINT ON ADMISSION  Chief Complaint   Patient presents with   • Fever     x 48 hours, max 103   • Leg Pain     RLE, NWB at home       CODE STATUS  Prior    HPI & HOSPITAL COURSE  36 y.o. female who presented 7/19/2017 with fever. She is a poor historian. 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 She reports she still makes urine. She undergoes IHD through [facility]. Used to be on coumadin to prevent AVF thrombosis. Reports she gave up IHD to be transitioned to PD. She reports she has been having fever and chills. THese started 2 days ago. She reports Tmax at home has been 103.7. She reports nausea and vomiting. Not providing details to the amount of vomiting. Denies any blood in vomitus. Overall slow, jittery and having myoclonic twitches 2/2 uremia. Gross asterixis / tremors evident. Otherwise she c/o SOB, CHAVES, LE swelling.     She was admitted to hospital. Dr patel of renal consulted. Dialysis restarted. She was given pain management for her chronic pain. She was given PT and ot. We awaited a dialysis chair that became available on 8/4    Therefore, she is discharged in fair and stable condition with close outpatient follow-up.    SPECIFIC OUTPATIENT FOLLOW-UP  pcp 1 week    DISCHARGE PROBLEM LIST  Active Problems:    Medical non-compliance POA: Yes    ESRD (end stage renal disease) (CMS-HCC) (Chronic) POA: Yes    Lupus (CMS-HCC) POA: Yes    Iron deficiency POA: Yes    Narcotic dependence (CMS-HCC) POA: Yes    Anemia POA: Yes    Leg weakness POA: Yes    Low back pain POA: Yes    Depression POA: Unknown  Resolved Problems:    Uremic encephalopathy POA: Yes    HTN (hypertension) POA: Yes    Acute hypoxemic respiratory failure (CMS-HCC) POA: Yes    Hypokalemia POA: Unknown    Asterixis POA: Yes      FOLLOW UP  Future Appointments  Date Time Provider Department Center   8/7/2017 11:00 AM CARE MANAGER ZOE DOYLE   8/9/2017 11:00 AM Martin Poe  HE[facility] VIVEK Manzano M.D.  [address] [address]  [phone]    In 1 week      postdischarge clinic follow-up in 1 week            MEDICATIONS ON DISCHARGE   Debby Carrizales   Home Medication Instructions CONI:[id number]    Printed on:08/05/17 4325   Medication Information                      amlodipine (NORVASC) 5 MG Tab  Take 5 mg by mouth as needed.             ascorbic acid (ASCORBIC ACID) 500 MG Tab  Take 500 mg by mouth every day.             buPROPion (WELLBUTRIN XL) 150 MG XL tablet  Take 150 mg by mouth every morning.             cholecalciferol (VITAMIN D3) 5000 UNIT Cap  Take 10,000 Units by mouth every day.             famotidine (PEPCID) 20 MG Tab  Take 1 Tab by mouth every day.             ferrous sulfate 325 (65 FE) MG tablet  Take 1 Tab by mouth 2 times a day, with meals.             hydroxychloroquine (PLAQUENIL) 200 MG Tab  Take 200 mg by mouth every bedtime.             Oxycodone HCl 20 MG Tab  Take 1 Tab by mouth every 6 hours as needed.             pregabalin (LYRICA) 150 MG Cap  Take 150 mg by mouth 3 times a day.             promethazine (PHENERGAN) 25 MG Tab  Take 25 mg by mouth every 6 hours as needed for Nausea/Vomiting.             tizanidine (ZANAFLEX) 4 MG Tab  Take 2 Tabs by mouth every bedtime.             trazodone (DESYREL) 50 MG Tab  Take 1 Tab by mouth every bedtime.                 DIET  No orders of the defined types were placed in this encounter.       ACTIVITY  As tolerated.  Weight bearing as tolerated      CONSULTATIONS  Dr patel -renal    PROCEDURES  none    LABORATORY  Lab Results   Component Value Date/Time    SODIUM 135 08/02/2017 05:50 AM    POTASSIUM 5.1 08/02/2017 05:50 AM    CHLORIDE 99 08/02/2017 05:50 AM    CO2 25 08/02/2017 05:50 AM    GLUCOSE 85 08/02/2017 05:50 AM    BUN 57* 08/02/2017 05:50 AM    CREATININE 9.19* 08/02/2017 05:50 AM        Lab Results   Component Value Date/Time    WBC 7.1 08/02/2017 05:50  AM    HEMOGLOBIN 9.2* 08/02/2017 05:50 AM    HEMATOCRIT 28.8* 08/02/2017 05:50 AM    PLATELET COUNT 117* 08/02/2017 05:50 AM        Total time of the discharge process exceeds 38 minutes

## 2017-08-07 ENCOUNTER — PATIENT OUTREACH (OUTPATIENT)
Dept: HEALTH INFORMATION MANAGEMENT | Facility: OTHER | Age: 37
End: 2017-08-07

## 2017-08-07 NOTE — PROGRESS NOTES
· 8/7/17 at 11:00 AM--Placed discharge outreach phone call to patient s/p hospital discharge 8/4/17.  Left voicemail with my contact information and instructions to return my phone call.    · 8/7/17 at 3:20 PM--Second attempt at discharge outreach phone call to patient s/p hospital discharge 8/4/17.  Left voicemail with my contact information and instructions to return my phone call.

## 2017-08-10 ENCOUNTER — PATIENT OUTREACH (OUTPATIENT)
Dept: HEALTH INFORMATION MANAGEMENT | Facility: OTHER | Age: 37
End: 2017-08-10

## 2017-08-11 ENCOUNTER — HOSPITAL ENCOUNTER (EMERGENCY)
Facility: MEDICAL CENTER | Age: 37
End: 2017-08-11
Attending: EMERGENCY MEDICINE
Payer: MEDICARE

## 2017-08-11 ENCOUNTER — APPOINTMENT (OUTPATIENT)
Dept: RADIOLOGY | Facility: MEDICAL CENTER | Age: 37
End: 2017-08-11
Attending: EMERGENCY MEDICINE
Payer: MEDICARE

## 2017-08-11 VITALS
DIASTOLIC BLOOD PRESSURE: 100 MMHG | TEMPERATURE: 98.5 F | HEART RATE: 75 BPM | BODY MASS INDEX: 27.92 KG/M2 | SYSTOLIC BLOOD PRESSURE: 150 MMHG | HEIGHT: 65 IN | RESPIRATION RATE: 16 BRPM | WEIGHT: 167.55 LBS | OXYGEN SATURATION: 96 %

## 2017-08-11 DIAGNOSIS — G89.4 CHRONIC PAIN SYNDROME: ICD-10-CM

## 2017-08-11 DIAGNOSIS — M79.604 PAIN OF RIGHT LOWER EXTREMITY: ICD-10-CM

## 2017-08-11 DIAGNOSIS — Z99.2 ESRD ON DIALYSIS (HCC): ICD-10-CM

## 2017-08-11 DIAGNOSIS — N18.6 ESRD ON DIALYSIS (HCC): ICD-10-CM

## 2017-08-11 LAB
ALBUMIN SERPL BCP-MCNC: 3.3 G/DL (ref 3.2–4.9)
ALBUMIN/GLOB SERPL: 1.1 G/DL
ALP SERPL-CCNC: 67 U/L (ref 30–99)
ALT SERPL-CCNC: 13 U/L (ref 2–50)
ANION GAP SERPL CALC-SCNC: 12 MMOL/L (ref 0–11.9)
APPEARANCE UR: CLEAR
APTT PPP: 158.4 SEC (ref 24.7–36)
APTT PPP: 29.7 SEC (ref 24.7–36)
AST SERPL-CCNC: 14 U/L (ref 12–45)
BACTERIA #/AREA URNS HPF: ABNORMAL /HPF
BASOPHILS # BLD AUTO: 0.8 % (ref 0–1.8)
BASOPHILS # BLD: 0.04 K/UL (ref 0–0.12)
BILIRUB SERPL-MCNC: 0.7 MG/DL (ref 0.1–1.5)
BILIRUB UR QL STRIP.AUTO: NEGATIVE
BUN SERPL-MCNC: 30 MG/DL (ref 8–22)
CALCIUM SERPL-MCNC: 8.5 MG/DL (ref 8.4–10.2)
CHLORIDE SERPL-SCNC: 103 MMOL/L (ref 96–112)
CK SERPL-CCNC: 28 U/L (ref 0–154)
CO2 SERPL-SCNC: 24 MMOL/L (ref 20–33)
COLOR UR: YELLOW
CREAT SERPL-MCNC: 6.63 MG/DL (ref 0.5–1.4)
CULTURE IF INDICATED INDCX: NO UA CULTURE
EOSINOPHIL # BLD AUTO: 0.13 K/UL (ref 0–0.51)
EOSINOPHIL NFR BLD: 2.5 % (ref 0–6.9)
EPI CELLS #/AREA URNS HPF: ABNORMAL /HPF
ERYTHROCYTE [DISTWIDTH] IN BLOOD BY AUTOMATED COUNT: 52.6 FL (ref 35.9–50)
GFR SERPL CREATININE-BSD FRML MDRD: 7 ML/MIN/1.73 M 2
GLOBULIN SER CALC-MCNC: 3 G/DL (ref 1.9–3.5)
GLUCOSE SERPL-MCNC: 79 MG/DL (ref 65–99)
GLUCOSE UR STRIP.AUTO-MCNC: NEGATIVE MG/DL
HCT VFR BLD AUTO: 30.3 % (ref 37–47)
HGB BLD-MCNC: 10 G/DL (ref 12–16)
IMM GRANULOCYTES # BLD AUTO: 0.02 K/UL (ref 0–0.11)
IMM GRANULOCYTES NFR BLD AUTO: 0.4 % (ref 0–0.9)
INR PPP: 1.03 (ref 0.87–1.13)
KETONES UR STRIP.AUTO-MCNC: NEGATIVE MG/DL
LEUKOCYTE ESTERASE UR QL STRIP.AUTO: NEGATIVE
LIPASE SERPL-CCNC: 23 U/L (ref 7–58)
LYMPHOCYTES # BLD AUTO: 1.8 K/UL (ref 1–4.8)
LYMPHOCYTES NFR BLD: 34.5 % (ref 22–41)
MAGNESIUM SERPL-MCNC: 2.6 MG/DL (ref 1.5–2.5)
MCH RBC QN AUTO: 32.6 PG (ref 27–33)
MCHC RBC AUTO-ENTMCNC: 33 G/DL (ref 33.6–35)
MCV RBC AUTO: 98.7 FL (ref 81.4–97.8)
MICRO URNS: ABNORMAL
MONOCYTES # BLD AUTO: 0.49 K/UL (ref 0–0.85)
MONOCYTES NFR BLD AUTO: 9.4 % (ref 0–13.4)
NEUTROPHILS # BLD AUTO: 2.74 K/UL (ref 2–7.15)
NEUTROPHILS NFR BLD: 52.4 % (ref 44–72)
NITRITE UR QL STRIP.AUTO: NEGATIVE
NRBC # BLD AUTO: 0 K/UL
NRBC BLD AUTO-RTO: 0 /100 WBC
PH UR STRIP.AUTO: 8.5 [PH]
PLATELET # BLD AUTO: 126 K/UL (ref 164–446)
PMV BLD AUTO: 9.8 FL (ref 9–12.9)
POTASSIUM SERPL-SCNC: 5.4 MMOL/L (ref 3.6–5.5)
PROT SERPL-MCNC: 6.3 G/DL (ref 6–8.2)
PROT UR QL STRIP: 100 MG/DL
PROTHROMBIN TIME: 13.3 SEC (ref 12–14.6)
RBC # BLD AUTO: 3.07 M/UL (ref 4.2–5.4)
RBC # URNS HPF: ABNORMAL /HPF
RBC UR QL AUTO: NEGATIVE
SODIUM SERPL-SCNC: 139 MMOL/L (ref 135–145)
SP GR UR STRIP.AUTO: 1.01
WBC # BLD AUTO: 5.2 K/UL (ref 4.8–10.8)
WBC #/AREA URNS HPF: ABNORMAL /HPF

## 2017-08-11 PROCEDURE — 80053 COMPREHEN METABOLIC PANEL: CPT

## 2017-08-11 PROCEDURE — 83690 ASSAY OF LIPASE: CPT

## 2017-08-11 PROCEDURE — 700111 HCHG RX REV CODE 636 W/ 250 OVERRIDE (IP): Performed by: EMERGENCY MEDICINE

## 2017-08-11 PROCEDURE — 85730 THROMBOPLASTIN TIME PARTIAL: CPT

## 2017-08-11 PROCEDURE — 83735 ASSAY OF MAGNESIUM: CPT

## 2017-08-11 PROCEDURE — 82550 ASSAY OF CK (CPK): CPT

## 2017-08-11 PROCEDURE — 93971 EXTREMITY STUDY: CPT | Mod: RT

## 2017-08-11 PROCEDURE — 700102 HCHG RX REV CODE 250 W/ 637 OVERRIDE(OP): Performed by: EMERGENCY MEDICINE

## 2017-08-11 PROCEDURE — 700111 HCHG RX REV CODE 636 W/ 250 OVERRIDE (IP)

## 2017-08-11 PROCEDURE — 81001 URINALYSIS AUTO W/SCOPE: CPT

## 2017-08-11 PROCEDURE — 85610 PROTHROMBIN TIME: CPT

## 2017-08-11 PROCEDURE — A9270 NON-COVERED ITEM OR SERVICE: HCPCS | Performed by: EMERGENCY MEDICINE

## 2017-08-11 PROCEDURE — 99284 EMERGENCY DEPT VISIT MOD MDM: CPT

## 2017-08-11 PROCEDURE — 85025 COMPLETE CBC W/AUTO DIFF WBC: CPT

## 2017-08-11 PROCEDURE — 96374 THER/PROPH/DIAG INJ IV PUSH: CPT

## 2017-08-11 RX ORDER — OXYCODONE HYDROCHLORIDE 5 MG/1
5 TABLET ORAL ONCE
Status: COMPLETED | OUTPATIENT
Start: 2017-08-11 | End: 2017-08-11

## 2017-08-11 RX ORDER — FERROUS SULFATE 325(65) MG
325 TABLET ORAL DAILY
Status: SHIPPED | COMMUNITY
End: 2021-11-01

## 2017-08-11 RX ADMIN — HEPARIN 500 UNITS: 100 SYRINGE at 20:46

## 2017-08-11 RX ADMIN — HYDROMORPHONE HYDROCHLORIDE 1 MG: 1 INJECTION, SOLUTION INTRAMUSCULAR; INTRAVENOUS; SUBCUTANEOUS at 19:46

## 2017-08-11 RX ADMIN — OXYCODONE HYDROCHLORIDE 5 MG: 5 TABLET ORAL at 18:19

## 2017-08-11 ASSESSMENT — PAIN SCALES - GENERAL
PAINLEVEL_OUTOF10: 9
PAINLEVEL_OUTOF10: 7
PAINLEVEL_OUTOF10: 4

## 2017-08-11 NOTE — ED AVS SNAPSHOT
Home Care Instructions                                                                                                                Debby Carrizales   MRN: [id number]    Department:  [facility], Emergency Dept   Date of Visit:  8/11/2017            [facility], Emergency Dept    [address]    [place] [address]    Phone:  [phone]      You were seen by     Lexx Vasquez M.D.      Your Diagnosis Was     Pain of right lower extremity     M79.604       These are the medications you received during your hospitalization from 08/11/2017 1624 to 08/11/2017 2025     Date/Time Order Dose Route Action    08/11/2017 1819 oxycodone immediate-release (ROXICODONE) tablet 5 mg 5 mg Oral Given    08/11/2017 1815 oxycodone immediate-release (ROXICODONE) tablet 5 mg 5 mg Oral Refused    08/11/2017 1946 HYDROmorphone (DILAUDID) injection 1 mg 1 mg Intravenous Given      Follow-up Information     1. Follow up with Sushila Manzano M.D..    Specialty:  Family Medicine    Contact information    [address]  [address]  [place] [id number] [phone]          2. Follow up with [facility], Emergency Dept.    Specialty:  Emergency Medicine    Why:  As needed, If symptoms worsen    Contact information    [address] [address] [phone]      Medication Information     Review all of your home medications and newly ordered medications with your primary doctor and/or pharmacist as soon as possible. Follow medication instructions as directed by your doctor and/or pharmacist.     Please keep your complete medication list with you and share with your physician. Update the information when medications are discontinued, doses are changed, or new medications (including over-the-counter products) are added; and carry medication information at all times in the event of emergency situations.               Medication List      ASK your  doctor about these medications        Instructions    Morning Afternoon Evening Bedtime    amlodipine 5 MG Tabs   Commonly known as:  NORVASC        Take 5 mg by mouth as needed.   Dose:  5 mg                        ascorbic acid 500 MG Tabs   Commonly known as:  ascorbic acid        Take 500 mg by mouth every day.   Dose:  500 mg                        cholecalciferol 5000 UNIT Caps   Commonly known as:  VITAMIN D3        Take 10,000 Units by mouth every day.   Dose:  84308 Units                        ferrous sulfate 325 (65 FE) MG tablet        Take 325 mg by mouth every day.   Dose:  325 mg                        hydroxychloroquine 200 MG Tabs   Commonly known as:  PLAQUENIL        Take 200 mg by mouth every bedtime.   Dose:  200 mg                        Oxycodone HCl 20 MG Tabs   Last time this was given:  5 mg on 8/11/2017  6:19 PM        Take 1 Tab by mouth every 6 hours as needed.   Dose:  20 mg                        pregabalin 150 MG Caps   Commonly known as:  LYRICA        Take 150 mg by mouth 3 times a day.   Dose:  150 mg                        promethazine 25 MG Tabs   Commonly known as:  PHENERGAN        Take 25 mg by mouth every 6 hours as needed for Nausea/Vomiting.   Dose:  25 mg                        tizanidine 4 MG Tabs   Commonly known as:  ZANAFLEX        Take 2 Tabs by mouth every bedtime.   Dose:  8 mg                        trazodone 50 MG Tabs   Commonly known as:  DESYREL        Take 1 Tab by mouth every bedtime.   Dose:  50 mg                        WELLBUTRIN  MG XL tablet   Generic drug:  buPROPion        Take 150 mg by mouth every morning.   Dose:  150 mg                                Procedures and tests performed during your visit     APTT    CBC WITH DIFFERENTIAL    COMP METABOLIC PANEL    CREATINE KINASE    ESTIMATED GFR    IV Saline Lock    LIPASE    MAGNESIUM    PROTHROMBIN TIME    PTT    URINALYSIS CULTURE, IF INDICATED    URINE MICROSCOPIC (W/UA)    US-EXTREMITY VENOUS  UNILATERAL-LOWER RIGHT        Discharge Instructions       Chronic Pain Management  Managing chronic pain is not easy. The goal is to provide as much pain relief as possible. There are emotional as well as physical problems. Chronic pain may lead to symptoms of depression which magnify those of the pain.  Problems may include:  · Anxiety.  · Sleep disturbances.  · Confused thinking.  · Feeling cranky.  · Fatigue.  · Weight gain or loss.  Identify the source of the pain first, if possible. The pain may be masking another problem. Try to find a pain management specialist or clinic. Work with a team to create a treatment plan for you.  MEDICATIONS  · May include narcotics or opioids. Larger than normal doses may be needed to control your pain.  · Drugs for depression may help.  · Over-the-counter medicines may help for some conditions. These drugs may be used along with others for better pain relief.  · May be injected into sites such as the spine and joints. Injections may have to be repeated if they wear off.  THERAPY MAY INCLUDE:  · Working with a physical therapist to keep from getting stiff.  · Regular, gentle exercise.  · Cognitive or behavioral therapy.  · Using complementary or integrative medicine such as:  · Acupuncture.  · Massage, Reiki, or Rolfing.  · Aroma, color, light, or sound therapy.  · Group support.  FOR MORE INFORMATION  http://www.painfoundation.org.  American Chronic Pain Association http://www.thealpa.org.  Document Released: 01/25/2006 Document Revised: 03/11/2013 Document Reviewed: 03/05/2009  ExitCare® Patient Information ©2014 [employer].            Patient Information     Patient Information    Following emergency treatment: all patient requiring follow-up care must return either to a private physician or a clinic if your condition worsens before you are able to obtain further medical attention, please return to the emergency room.     Billing Information    At [facility], we work to  make the billing process streamlined for our patients.  Our Representatives are here to answer any questions you may have regarding your hospital bill.  If you have insurance coverage and have supplied your insurance information to us, we will submit a claim to your insurer on your behalf.  Should you have any questions regarding your bill, we can be reached online or by phone as follows:  Online: You are able pay your bills online or live chat with our representatives about any billing questions you may have. We are here to help Monday - Friday from 8:00am to 7:30pm and 9:00am - 12:00pm on Saturdays.  Please visit [email or web address]  for more information.   Phone:  [phone] or [phone]    Please note that your emergency physician, surgeon, pathologist, radiologist, anesthesiologist, and other specialists are not employed by [facility] and will therefore bill separately for their services.  Please contact them directly for any questions concerning their bills at the numbers below:     Emergency Physician Services:  [phone]  [facility]:  [phone]  [facility]:  [phone]  [facility]:  [phone]    1. Your final bill may vary from the amount quoted upon discharge if all procedures are not complete at that time, or if your doctor has additional procedures of which we are not aware. You will receive an additional bill if you return to the Emergency Department at [facility] for suture removal regardless of the facility of which the sutures were placed.     2. Please arrange for settlement of this account at the emergency registration.    3. All self-pay accounts are due in full at the time of treatment.  If you are unable to meet this obligation then payment is expected within 4-5 days.     4. If you have had radiology studies (CT, X-ray, Ultrasound, MRI), you have received a preliminary result during your  emergency department visit. Please contact the radiology department [phone] to receive a copy of your final result. Please discuss the Final result with your primary physician or with the follow up physician provided.     Crisis Hotline:  [place] Crisis Hotline:  6-513-UXIKWEK or 1-315.754.6709  [place] Crisis Hotline:    1-771.944.6646 or 436-642-6724         ED Discharge Follow Up Questions    1. In order to provide you with very good care, we would like to follow up with a phone call in the next few days.  May we have your permission to contact you?     YES /  NO    2. What is the best phone number to call you? (       )_____-__________    3. What is the best time to call you?      Morning  /  Afternoon  /  Evening                   Patient Signature:  ____________________________________________________________    Date:  ____________________________________________________________

## 2017-08-11 NOTE — PROGRESS NOTES
· 8/10/17 at 4:58 PM--Received phone call from pt s/p hospital discharge 8/4/17.  Pt states she is still having problems with her legs and pain.  ER precautions reviewed with pt.  Transferred pt to scheduling so that she can schedule a f/u appt with her PCP.  Phone number of [phone] provided to pt in case of disconnect.

## 2017-08-11 NOTE — ED AVS SNAPSHOT
8/11/2017    Debby Carrizales  [address]  [address]  [place] [address]    Dear Debby:    [facility] wants to ensure your discharge home is safe and you or your loved ones have had all of your questions answered regarding your care after you leave the hospital.    Below is a list of resources and contact information should you have any questions regarding your hospital stay, follow-up instructions, or active medical symptoms.    Questions or Concerns Regarding… Contact   Medical Questions Related to Your Discharge  (7 days a week, 8am-5pm) Contact a Nurse Care Coordinator   [phone]   Medical Questions Not Related to Your Discharge  (24 hours a day / 7 days a week)  Contact the Nurse Health Line   [phone]    Medications or Discharge Instructions Refer to your discharge packet   or contact your [facility] Primary Care Provider   [phone]   Follow-up Appointment(s) Schedule your appointment via [employer]   or contact Scheduling [phone]   Billing Review your statement via [employer]  or contact Billing [phone]   Medical Records Review your records via [employer]   or contact Medical Records [phone]     You may receive a telephone call within two days of discharge. This call is to make certain you understand your discharge instructions and have the opportunity to have any questions answered. You can also easily access your medical information, test results and upcoming appointments via the [employer] free online health management tool. You can learn more and sign up at [employer]. For assistance setting up your [employer] account, please call [phone].    Once again, we want to ensure your discharge home is safe and that you have a clear understanding of any next steps in your care. If you have any questions or concerns, please do not hesitate to contact us, we are here for you. Thank you for choosing [facility] for your healthcare needs.    Sincerely,    Your [facility] Healthcare Team

## 2017-08-11 NOTE — ED AVS SNAPSHOT
Keldelice Access Code: [email or web address]  Expires: 8/27/2017  4:10 AM    Keldelice  A secure, online tool to manage your health information     [employer]’s Keldelice® is a secure, online tool that connects you to your personalized health information from the privacy of your home -- day or night - making it very easy for you to manage your healthcare. Once the activation process is completed, you can even access your medical information using the Keldelice ayaan, which is available for free in the Apple Ayaan store or Google Play store.     Keldelice provides the following levels of access (as shown below):   My Chart Features   [facility] Primary Care Doctor [facility]  Specialists [facility]  Urgent  Care Non-[facility]  Primary Care  Doctor   Email your healthcare team securely and privately 24/7 X X X X   Manage appointments: schedule your next appointment; view details of past/upcoming appointments X      Request prescription refills. X      View recent personal medical records, including lab and immunizations X X X X   View health record, including health history, allergies, medications X X X X   Read reports about your outpatient visits, procedures, consult and ER notes X X X X   See your discharge summary, which is a recap of your hospital and/or ER visit that includes your diagnosis, lab results, and care plan. X X       How to register for Keldelice:  1. Go to  [email or web address].  2. Click on the Sign Up Now box, which takes you to the New Member Sign Up page. You will need to provide the following information:  a. Enter your Keldelice Access Code exactly as it appears at the top of this page. (You will not need to use this code after you’ve completed the sign-up process. If you do not sign up before the expiration date, you must request a new code.)   b. Enter your date of birth.   c. Enter your home email address.   d. Click Submit, and follow the next screen’s instructions.  3. Create a Keldelice ID. This will be your Keldelice  login ID and cannot be changed, so think of one that is secure and easy to remember.  4. Create a [employer] password. You can change your password at any time.  5. Enter your Password Reset Question and Answer. This can be used at a later time if you forget your password.   6. Enter your e-mail address. This allows you to receive e-mail notifications when new information is available in [employer].  7. Click Sign Up. You can now view your health information.    For assistance activating your Fluencr account, call [phone]

## 2017-08-11 NOTE — ED PROVIDER NOTES
CHIEF COMPLAINT  Chief Complaint   Patient presents with   • Leg Pain   • Extremity Weakness       [facility] [place]  Debby Carrizales is a 36 y.o. female with a history of fibromyalgia, lupus, end-stage renal disease - on dialysis, who presents with right lower extremity pain from the knee down. Reports that the pain feels deep and is rather severe. She reports that she was recently admitted for a week and was discharged last Friday approximate 7 days ago. Reports that she was admitted at that time for generalized malaise and missed dialysis.    She reports that her last dialysis was Wednesday and called the dialysis service for rescheduling for tomorrow morning at around 10 AM.    Patient denies fevers. Has chronic hip pain. Chronic back pain involving the bilateral flanks. Denies chest pain or active shortness of breath symptoms at this time. No nausea or vomiting. Patient has generalized malaise with this.    REVIEW OF SYSTEMS  See HPI for further details. All other systems are negative.     PAST MEDICAL HISTORY   has a past medical history of Lupus (CMS-HCC); Fibromyalgia; Hypertension; Backpain; Avascular necrosis; Renal failure; Arthritis; Psychiatric disorder; UTI (urinary tract infection) (4/4/2013); Clostridium difficile colitis (5/3/2011); Pneumonia (); and Dialysis patient.    SOCIAL HISTORY  Social History     Social History Main Topics   • Smoking status: Former Smoker -- 0.50 packs/day for 18 years     Types: Cigarettes     Quit date: 06/13/2011   • Smokeless tobacco: Never Used      Comment: 1/2 ppd   • Alcohol Use: No      Comment: occ   • Drug Use: No   • Sexual Activity: Not on file       SURGICAL HISTORY   has past surgical history that includes gastroscopy-endo (4/10/2011); sclerotheraphy (4/10/2011); gastroscopy-endo (4/18/2011); colonoscopy with biopsy (4/20/2011); gastroscopy-endo (4/27/2011); other (5/2011); other abdominal surgery; av fistula creation (9/9/2014); cath placement (9/9/2014);  "av fistula creation (11/14/2014); av fistula creation (2/3/2015); hip arthroplasty total (Right, 1/18/2016); closed reduction (Right, 7/5/2016); av fistula creation (Right, 7/12/2016); thrombectomy (Right, 8/20/2016); thrombectomy (Right, 8/21/2016); angioplasty balloon (8/21/2016); and tracheostomy.    CURRENT MEDICATIONS  Home Medications     Reviewed by Kasia Cornejo (Pharmacy Tech) on 08/11/17 at 1727  Med List Status: Complete    Medication Last Dose Status    amlodipine (NORVASC) 5 MG Tab 8/11/2017 Active    ascorbic acid (ASCORBIC ACID) 500 MG Tab 8/11/2017 Active    buPROPion (WELLBUTRIN XL) 150 MG XL tablet 8/11/2017 Active    cholecalciferol (VITAMIN D3) 5000 UNIT Cap 8/11/2017 Active    ferrous sulfate 325 (65 FE) MG tablet 8/11/2017 Active    hydroxychloroquine (PLAQUENIL) 200 MG Tab 8/10/2017 Active    Oxycodone HCl 20 MG Tab 8/11/2017 Active    pregabalin (LYRICA) 150 MG Cap 8/11/2017 Active    promethazine (PHENERGAN) 25 MG Tab 8/11/2017 Active    tizanidine (ZANAFLEX) 4 MG Tab 8/10/2017 Active    trazodone (DESYREL) 50 MG Tab 8/10/2017 Active                ALLERGIES  Allergies   Allergen Reactions   • Ativan Anxiety     Patient becomes severely paranoid and agitated   • Morphine Itching     Tolerates Dilaudid   • Seasonal Runny Nose and Itching     Hay fever, sabiha brush       PHYSICAL EXAM  VITAL SIGNS: /100 mmHg  Pulse 89  Temp(Src) 36.9 °C (98.5 °F)  Resp 20  Ht 1.651 m (5' 5\")  Wt 76 kg (167 lb 8.8 oz)  BMI 27.88 kg/m2  SpO2 98%  LMP 07/18/2017  Pulse ox interpretation: I interpret this pulse ox as normal.  Constitutional: Alert in no apparent distress.  HENT: No signs of trauma, Bilateral external ears normal, Nose normal.   Eyes: Pupils are equal and reactive, Conjunctiva normal, Non-icteric.   Cardiovascular: Regular rate and rhythm, no murmurs.   Thorax & Lungs: Normal breath sounds, No respiratory distress, No wheezing, No chest tenderness.   Abdomen: Bowel sounds " normal, Soft, No tenderness, No masses, No pulsatile masses. No peritoneal signs.  Skin: Warm, Dry, No erythema, No rash.  Multiple scars to the right upper extremity with prior fistula and graft placements.  Back: No bony tenderness, No CVA tenderness.   Extremities: Intact distal pulses, No edema, No tenderness, No cyanosis  Musculoskeletal: Good range of motion in all major joints. No tenderness to palpation or major deformities noted.   Neurologic: Alert , Normal motor function and gait, Normal sensory function, No focal deficits noted.   Psychiatric: Anxious affect      DIAGNOSTIC STUDIES / PROCEDURES    LABS  Labs Reviewed   CBC WITH DIFFERENTIAL - Abnormal; Notable for the following:     RBC 3.07 (*)     Hemoglobin 10.0 (*)     Hematocrit 30.3 (*)     MCV 98.7 (*)     MCHC 33.0 (*)     RDW 52.6 (*)     Platelet Count 126 (*)     All other components within normal limits    Narrative:     Indicate which anticoagulants the patient is on:->NONE   COMP METABOLIC PANEL - Abnormal; Notable for the following:     Anion Gap 12.0 (*)     Bun 30 (*)     Creatinine 6.63 (*)     All other components within normal limits    Narrative:     Indicate which anticoagulants the patient is on:->NONE   APTT - Abnormal; Notable for the following:     APTT 158.4 (*)     All other components within normal limits    Narrative:     Indicate which anticoagulants the patient is on:->NONE   URINALYSIS,CULTURE IF INDICATED - Abnormal; Notable for the following:     Ph 8.5 (*)     Protein 100 (*)     All other components within normal limits   MAGNESIUM - Abnormal; Notable for the following:     Magnesium 2.6 (*)     All other components within normal limits    Narrative:     Indicate which anticoagulants the patient is on:->NONE   ESTIMATED GFR - Abnormal; Notable for the following:     GFR If  9 (*)     GFR If Non  7 (*)     All other components within normal limits    Narrative:     Indicate which  anticoagulants the patient is on:->NONE   URINE MICROSCOPIC (W/UA) - Abnormal; Notable for the following:     Bacteria Few (*)     All other components within normal limits   LIPASE    Narrative:     Indicate which anticoagulants the patient is on:->NONE   PROTHROMBIN TIME    Narrative:     Indicate which anticoagulants the patient is on:->NONE   CREATINE KINASE    Narrative:     Indicate which anticoagulants the patient is on:->NONE   APTT    Narrative:     Indicate which anticoagulants the patient is on:->NONE       RADIOLOGY  US-EXTREMITY VENOUS UNILATERAL-LOWER RIGHT   Final Result         1. No evidence of right lower extremity deep venous thrombosis.          COURSE & MEDICAL DECISION MAKING  Pertinent Labs & Imaging studies reviewed. (See chart for details)  36-year-old female presenting with right lower extremity pain. Denies any trauma or deformity. Reports a sharp diffuse pain. On examination, no skin changes or swelling. No edema. No open wounds. No erythema. Normal range of motion of the entire lower extremity. No bony deformities. No calf tenderness. Negative Homans sign. No CVA tenderness on palpation.    Was seen here for similar symptoms recently. MRI of the lumbar spine was performed that was unremarkable for acute cause.  She is neurovascularly intact distally. CPK was ordered for possible myositis. Was found to be unremarkable. Ultrasound was performed as well to help rule out for possible DVT. This is normal as well. No obvious bony deformity to suggest fracture. No swelling or erythema to suggest underlying infection. Uncertain etiology of the patient's leg pain at this time. She has chronic pain symptoms and was instructed to follow-up with her chronic pain physician for further management.    Laboratory studies were performed given the patient's generalized malaise symptoms as well. These are found to be largely unremarkable. BUN and creatinine are improved compared to prior baseline now that  she has been receiving dialysis. No evidence of underlying infection. Potassium is normal.    Unclear etiology of the patient's symptoms at this time. She is instructed to follow-up with her primary care physician further management. Return precautions were provided for any worsening of her symptoms or development of any other concerning signs or symptoms. The patient was discharged in stable condition. Informed of the importance of following up with dialysis tomorrow.    Has a history of noncompliance with her dialysis. Missed dialysis today in order to come to the emergency department. She did some reschedule for tomorrow.  Patient did appear anxious. She was provided with reassurance given the unremarkable workup today. She was informed however that it is very important to follow up with her primary care physician for further management, and to return for any worsening of her symptoms or development of any other concerning signs or symptoms.      The patient will not drink alcohol nor drive with prescribed medications.   The patient will return for worsening symptoms or failure of improvement and is stable at the time of discharge. The patient verbalizes understanding in their own words.    FINAL IMPRESSION  1. Pain of right lower extremity    2. Chronic pain syndrome    3. ESRD on dialysis (CMS-HCC)    4.      Situational anxiety        Electronically signed by: Lexx Vasquez, 8/11/2017 4:56 PM

## 2017-08-12 NOTE — ED NOTES
"Pt refused roxicodone stating \" I take 20 mg at home and it has not been helping, 5 mg will not help.\" ERP aware.   "

## 2017-08-12 NOTE — ED NOTES
Gwen from Lab called with critical result of Ptt of 158.4 at 1834. Critical lab result read back to Gwen.   Dr. Vasquez notified of critical lab result at 1834.  Critical lab result read back by Dr. Vasquez.

## 2017-08-12 NOTE — ED NOTES
The Medication Reconciliation process has been completed by interviewing the patient    Allergies have been reviewed  Antibiotic use in 30 days - none    Home Pharmacy:  [facility] [address] for maint. Meds, [facility] for pain meds

## 2017-08-12 NOTE — DISCHARGE INSTRUCTIONS
Chronic Pain Management  Managing chronic pain is not easy. The goal is to provide as much pain relief as possible. There are emotional as well as physical problems. Chronic pain may lead to symptoms of depression which magnify those of the pain.  Problems may include:  · Anxiety.  · Sleep disturbances.  · Confused thinking.  · Feeling cranky.  · Fatigue.  · Weight gain or loss.  Identify the source of the pain first, if possible. The pain may be masking another problem. Try to find a pain management specialist or clinic. Work with a team to create a treatment plan for you.  MEDICATIONS  · May include narcotics or opioids. Larger than normal doses may be needed to control your pain.  · Drugs for depression may help.  · Over-the-counter medicines may help for some conditions. These drugs may be used along with others for better pain relief.  · May be injected into sites such as the spine and joints. Injections may have to be repeated if they wear off.  THERAPY MAY INCLUDE:  · Working with a physical therapist to keep from getting stiff.  · Regular, gentle exercise.  · Cognitive or behavioral therapy.  · Using complementary or integrative medicine such as:  · Acupuncture.  · Massage, Reiki, or Rolfing.  · Aroma, color, light, or sound therapy.  · Group support.  FOR MORE INFORMATION  http://www.painfoundation.org.  American Chronic Pain Association http://www.thealpa.org.  Document Released: 01/25/2006 Document Revised: 03/11/2013 Document Reviewed: 03/05/2009  ExitCare® Patient Information ©2014 [employer].

## 2017-08-12 NOTE — ED NOTES
Lab called to redraw repeat PTT from second site. This RN attempted to draw from hand but was unsuccessful.

## 2017-08-12 NOTE — ED NOTES
"Pt was due for dialysis today, rescheduled until 1000 tomorrow as she was \" just not feeling well enough.\"  "

## 2017-08-15 ENCOUNTER — HOSPITAL ENCOUNTER (OUTPATIENT)
Dept: RADIOLOGY | Facility: MEDICAL CENTER | Age: 37
End: 2017-08-15
Attending: FAMILY MEDICINE
Payer: MEDICARE

## 2017-08-15 ENCOUNTER — OFFICE VISIT (OUTPATIENT)
Dept: MEDICAL GROUP | Facility: MEDICAL CENTER | Age: 37
End: 2017-08-15
Payer: MEDICARE

## 2017-08-15 VITALS
TEMPERATURE: 98.6 F | RESPIRATION RATE: 14 BRPM | SYSTOLIC BLOOD PRESSURE: 122 MMHG | DIASTOLIC BLOOD PRESSURE: 74 MMHG | WEIGHT: 166 LBS | BODY MASS INDEX: 27.66 KG/M2 | HEART RATE: 68 BPM | HEIGHT: 65 IN | OXYGEN SATURATION: 97 %

## 2017-08-15 DIAGNOSIS — M79.7 FIBROMYALGIA: ICD-10-CM

## 2017-08-15 DIAGNOSIS — F11.20 NARCOTIC DEPENDENCE (HCC): ICD-10-CM

## 2017-08-15 DIAGNOSIS — M32.14: ICD-10-CM

## 2017-08-15 DIAGNOSIS — M79.604 RIGHT LEG PAIN: ICD-10-CM

## 2017-08-15 DIAGNOSIS — Z99.2 ESRD (END STAGE RENAL DISEASE) ON DIALYSIS (HCC): ICD-10-CM

## 2017-08-15 DIAGNOSIS — F41.9 ANXIETY: ICD-10-CM

## 2017-08-15 DIAGNOSIS — N18.6 ESRD (END STAGE RENAL DISEASE) ON DIALYSIS (HCC): ICD-10-CM

## 2017-08-15 PROBLEM — M32.9 LUPUS (HCC): Status: RESOLVED | Noted: 2017-07-19 | Resolved: 2017-08-15

## 2017-08-15 PROBLEM — R29.898 LEG WEAKNESS: Status: RESOLVED | Noted: 2017-07-19 | Resolved: 2017-08-15

## 2017-08-15 PROBLEM — E61.1 IRON DEFICIENCY: Status: RESOLVED | Noted: 2017-07-25 | Resolved: 2017-08-15

## 2017-08-15 PROCEDURE — 99215 OFFICE O/P EST HI 40 MIN: CPT | Performed by: FAMILY MEDICINE

## 2017-08-15 PROCEDURE — 73610 X-RAY EXAM OF ANKLE: CPT | Mod: RT

## 2017-08-15 PROCEDURE — 73562 X-RAY EXAM OF KNEE 3: CPT | Mod: RT

## 2017-08-15 RX ORDER — HYDROXYZINE HYDROCHLORIDE 25 MG/1
12.5-25 TABLET, FILM COATED ORAL 3 TIMES DAILY PRN
Qty: 30 TAB | Refills: 1 | Status: SHIPPED | OUTPATIENT
Start: 2017-08-15 | End: 2018-02-09 | Stop reason: SDUPTHER

## 2017-08-15 RX ORDER — HYDROXYZINE HYDROCHLORIDE 25 MG/1
12.5-25 TABLET, FILM COATED ORAL 3 TIMES DAILY PRN
Qty: 30 TAB | Refills: 1 | Status: SHIPPED | OUTPATIENT
Start: 2017-08-15 | End: 2017-08-15 | Stop reason: SDUPTHER

## 2017-08-15 NOTE — PROGRESS NOTES
cc: Right leg pain    Subjective:     Debby Carrizales is a 36 y.o. female presenting to discuss right leg pain. She reports that she woke up from a nap. About a month ago, and noticed her right leg numbness and tingling. She was hospitalized from July 19-August 4 with uremia, missed dialysis, possible pneumonia. After discharge, the right leg developed pain. The pain is pretty constant, throbbing, sharp at times, radiates to her calf. Seems to be worsening. Has tried heat, ice, lidocaine patches, prednisone, oxycodone. Is currently on oxycodone 20 mg 1-2 times a day for her chronic pain/fibromyalgia. She notes that the oxycodone does not help with her right leg pain. Is also on Lyrica 150 mg 3 times a day. Denies any injuries to the leg. Nothing makes it better. Denies any fevers, redness, swelling, infections, skin changes.    Review of systems:  See above.          Current outpatient prescriptions:   •  Diclofenac Sodium (VOLTAREN) 1 % Gel, Apply 4 g of 1% gel to affected area 4 times daily as needed (maximum: 16 g per joint per day) for pain, Disp: 100 g, Rfl: 3  •  hydrOXYzine (ATARAX) 25 MG Tab, Take 0.5-1 Tabs by mouth 3 times a day as needed for Anxiety., Disp: 30 Tab, Rfl: 1  •  ferrous sulfate 325 (65 FE) MG tablet, Take 325 mg by mouth every day., Disp: , Rfl:   •  Oxycodone HCl 20 MG Tab, Take 1 Tab by mouth every 6 hours as needed., Disp: 28 Tab, Rfl: 0  •  amlodipine (NORVASC) 5 MG Tab, Take 5 mg by mouth as needed., Disp: , Rfl:   •  ascorbic acid (ASCORBIC ACID) 500 MG Tab, Take 500 mg by mouth every day., Disp: , Rfl:   •  buPROPion (WELLBUTRIN XL) 150 MG XL tablet, Take 150 mg by mouth every morning., Disp: , Rfl:   •  pregabalin (LYRICA) 150 MG Cap, Take 150 mg by mouth 3 times a day., Disp: , Rfl:   •  promethazine (PHENERGAN) 25 MG Tab, Take 25 mg by mouth every 6 hours as needed for Nausea/Vomiting., Disp: , Rfl:   •  hydroxychloroquine (PLAQUENIL) 200 MG Tab, Take 200 mg by mouth every  bedtime., Disp: , Rfl:   •  cholecalciferol (VITAMIN D3) 5000 UNIT Cap, Take 10,000 Units by mouth every day., Disp: , Rfl:   •  trazodone (DESYREL) 50 MG Tab, Take 1 Tab by mouth every bedtime., Disp: 30 Tab, Rfl: 0  •  tizanidine (ZANAFLEX) 4 MG Tab, Take 2 Tabs by mouth every bedtime., Disp: 60 Tab, Rfl: 0    Allergies   Allergen Reactions   • Ativan Anxiety     Patient becomes severely paranoid and agitated   • Morphine Itching     Tolerates Dilaudid   • Seasonal Runny Nose and Itching     Hay fever, sabiha brush       Past Medical History   Diagnosis Date   • Lupus (CMS-HCC)    • Fibromyalgia    • Hypertension    • Renal failure    • Arthritis      all joints,r/t lupus   • Psychiatric disorder      anxiety, depression   • Clostridium difficile colitis 5/3/2011   • Pneumonia    • Dialysis patient    • Avascular necrosis of bones of both hips ([facility]) 10/10/2016   • ESBL (extended spectrum beta-lactamase) producing bacteria infection 8/25/2014     Past Surgical History   Procedure Laterality Date   • Gastroscopy-endo  4/10/2011     Performed by SYED JURADO at ENDOSCOPY [facility]   • Sclerotheraphy  4/10/2011     Performed by SYED JURADO at ENDOSCOPY [facility]   • Gastroscopy-endo  4/18/2011     Performed by ALCIRA CRUZ at ENDOSCOPY [facility]   • Colonoscopy with biopsy  4/20/2011     Performed by ALCIRA CRUZ at ENDOSCOPY [facility]   • Gastroscopy-endo  4/27/2011     Performed by PALMIRA DOAN at ENDOSCOPY [facility]   • Other  5/2011     tracheostomy   • Other abdominal surgery       kidney biopsy   • Av fistula creation  9/9/2014     Performed by Shabbir Ardon M.D. at SURGERY [facility]   • Cath placement  9/9/2014     Performed by Shabbir Ardon M.D. at SURGERY [facility]   • Av fistula creation  11/14/2014     Performed by Shabbir Ardon M.D. at SURGERY [facility]   • Av fistula creation  2/3/2015     Performed by  "Shabbir Ardon M.D. at SURGERY [facility]   • Hip arthroplasty total Right 1/18/2016     Procedure: HIP ARTHROPLASTY TOTAL;  Surgeon: Michael Holman M.D.;  Location: [facility];  Service:    • Closed reduction Right 7/5/2016     Procedure: CLOSED REDUCTION- Hip ;  Surgeon: Michael Holman M.D.;  Location: SURGERY [facility];  Service:    • Av fistula creation Right 7/12/2016     Procedure: AV FISTULA CREATION WITH GRAFT BRACHIAL AXILLARY;  Surgeon: Shabbir Ardon M.D.;  Location: SURGERY [facility];  Service:    • Thrombectomy Right 8/20/2016     Procedure: THROMBECTOMY AV GRAFT;  Surgeon: Shabbir Ardon M.D.;  Location: [facility];  Service:    • Thrombectomy Right 8/21/2016     Procedure: THROMBECTOMY - right AV fistula graft with grams;  Surgeon: Shabbir Ardon M.D.;  Location: SURGERY [facility];  Service:    • Angioplasty balloon  8/21/2016     Procedure: ANGIOPLASTY BALLOON;  Surgeon: Shabbir Ardon M.D.;  Location: SURGERY [facility];  Service:    • Tracheostomy       History reviewed. No pertinent family history.  Social History     Social History   • Marital Status: Single     Spouse Name: N/A   • Number of Children: N/A   • Years of Education: N/A     Occupational History   • Not on file.     Social History Main Topics   • Smoking status: Former Smoker -- 0.50 packs/day for 18 years     Types: Cigarettes     Quit date: 06/13/2011   • Smokeless tobacco: Never Used      Comment: 1/2 ppd   • Alcohol Use: No      Comment: occ   • Drug Use: No   • Sexual Activity: Not on file     Other Topics Concern   • Not on file     Social History Narrative       Objective:     Vitals: /74 mmHg  Pulse 68  Temp(Src) 37 °C (98.6 °F)  Resp 14  Ht 1.651 m (5' 5\")  Wt 75.297 kg (166 lb)  BMI 27.62 kg/m2  SpO2 97%  LMP 07/15/2017  Breastfeeding? No  General: Alert, pleasant, NAD  HEENT: Normocephalic.    Skin: Warm, dry, no rashes.  Extremities: No " leg edema.  Pedal pulses 2+ symmetric. Right lower extremity with no swelling, edema, deformities.  Psych:  Affect/mood is normal, judgement is good, memory is intact, grooming is appropriate.    Assessment/Plan:     Diagnoses and all orders for this visit:    Right leg pain  Unclear etiology. She had a lumbar MRI in the hospital that was normal, ultrasound was negative for DVTs. Foot does not look ischemic. No history of injuries.  vitamin B12 level in the hospital was normal, possible neuropathy or medication related.  She will f/u with her rheumatologist.  Will check x-rays and lower extremity vasculature. We will try topical Voltaren as needed, could consider nortriptyline or amitriptyline, but she is already on Wellbutrin. Discussed possibly switching to Cymbalta, she has tried this in the past.  referral to pain clinic and acupuncture placed. She will follow up with her rheumatologist regarding her pain medications  -     DX-ANKLE 3+ VIEWS RIGHT; Future  -     DX-KNEE 3 VIEWS RIGHT; Future  -     REFERRAL TO PAIN CLINIC  -     LE ART/IHSAN; Future        -     Diclofenac Sodium (VOLTAREN) 1 % Gel; Apply 4 g of 1% gel to affected area 4 times daily as needed (maximum: 16 g per joint per day) for pain    Chronic lupus nephritis (CMS-HCC)  Fibromyalgia  Narcotic dependence (CMS-HCC)  She plans to make an appointment with her rheumatologist in the near future. Referral to pain and acupuncture placed  -     REFERRAL TO PAIN CLINIC  -     REFERRAL FOR ACUPUNCTURE    ESRD (end stage renal disease) on dialysis (CMS-HCC)  Followed by nephrology. Encouraged her to remember to go to dialysis.  She has a  at her dialysis unit.  -     REFERRAL FOR ACUPUNCTURE  -     LE ART/IHSAN; Future    Anxiety  She reports having anxiety during dialysis. Will try hydroxyzine. Would avoid benzos given her oxycodone use  -     hydrOXYzine (ATARAX) 25 MG Tab; Take 0.5-1 Tabs by mouth 3 times a day as needed for  Anxiety.      Patient was seen for a total of 40 minutes face-to-face, with more than half of the time spent counseling or coordinating care regarding the above mentioned problems.       Return in about 3 months (around 11/15/2017) for Med check.

## 2017-08-15 NOTE — MR AVS SNAPSHOT
"        Debby Carrizales   8/15/2017 1:00 PM   Office Visit   MRN: [id number]    Department:  [address] [place] [address]   Dept Phone:  [phone]    Description:  Female : 1980   Provider:  Sushila Manzano M.D.           Allergies as of 8/15/2017     Allergen Noted Reactions    Ativan 2017   Anxiety    Patient becomes severely paranoid and agitated    Morphine 2015   Itching    Tolerates Dilaudid    Seasonal 2016   Runny Nose, Itching    Hay fever, sabiha brush      You were diagnosed with     Right leg pain   [907319]       Chronic lupus nephritis (CMS-HCC)   [928039]       Fibromyalgia   [192447]       Narcotic dependence (CMS-HCC)   [855203]       ESRD (end stage renal disease) on dialysis (CMS-HCC)   [727399]       Anxiety   [927052]         Vital Signs     Blood Pressure Pulse Temperature Respirations Height Weight    122/74 mmHg 68 37 °C (98.6 °F) 14 1.651 m (5' 5\") 75.297 kg (166 lb)    Body Mass Index Oxygen Saturation Last Menstrual Period Breastfeeding? Smoking Status       27.62 kg/m2 97% 07/15/2017 No Former Smoker       Basic Information     Date Of Birth Sex Race Ethnicity Preferred Language    1980 Female White Non- English      Problem List              ICD-10-CM Priority Class Noted - Resolved    DAVI (obstructive sleep apnea) G47.33 Medium  2011 - Present    Fibromyalgia M79.7 Low  2/10/2012 - Present    Anxiety F41.9 Low  2/10/2012 - Present    Chronic lupus nephritis (CMS-HCC) M32.14 Medium  2013 - Present    Narcotic dependence (CMS-HCC) F11.20 Low  2013 - Present    Hyperlipidemia E78.5 Low  2013 - Present    Vitamin D deficiency disease E55.9   2013 - Present    Thrombocytopenia (CMS-HCC) D69.6 Low  2015 - Present    ESRD (end stage renal disease) on dialysis (CMS-HCC) N18.6, Z99.2   10/10/2016 - Present    Medical non-compliance Z91.19 High  2017 - Present    Hypertension I10   2017 - Present    A-V fistula " (CMS-HCC) I77.0   4/21/2017 - Present    Anemia D64.9 Low  7/19/2017 - Present    Low back pain M54.5 Low  7/19/2017 - Present    Depression F32.9 Low  7/25/2017 - Present      Health Maintenance        Date Due Completion Dates    IMM DTaP/Tdap/Td Vaccine (2 - Tdap) 9/18/2001 7/7/1997, 6/22/1987, 12/27/1985    IMM PNEUMOCOCCAL 19-64 (ADULT) HIGHEST RISK SERIES (1 of 3 - PCV13) 9/18/2001 ---    PAP SMEAR 9/18/2003 ---    IMM INFLUENZA (1) 9/1/2017 ---            Current Immunizations     DTP 6/22/1987, 12/27/1985    MMR Vaccine 7/7/1997    OPV - Historical Data 6/22/1987, 12/27/1985    TD Vaccine 7/7/1997      Below and/or attached are the medications your provider expects you to take. Review all of your home medications and newly ordered medications with your provider and/or pharmacist. Follow medication instructions as directed by your provider and/or pharmacist. Please keep your medication list with you and share with your provider. Update the information when medications are discontinued, doses are changed, or new medications (including over-the-counter products) are added; and carry medication information at all times in the event of emergency situations     Allergies:  ATIVAN - Anxiety     MORPHINE - Itching     SEASONAL - Runny Nose,Itching               Medications  Valid as of: August 15, 2017 -  2:10 PM    Generic Name Brand Name Tablet Size Instructions for use    AmLODIPine Besylate (Tab) NORVASC 5 MG Take 5 mg by mouth as needed.        Ascorbic Acid (Tab) ascorbic acid 500 MG Take 500 mg by mouth every day.        BuPROPion HCl (TABLET SR 24 HR) WELLBUTRIN  MG Take 150 mg by mouth every morning.        Cholecalciferol (Cap) VITAMIN D3 5000 UNIT Take 10,000 Units by mouth every day.        Diclofenac Sodium (Gel) Diclofenac Sodium 1 % Apply 4 g of 1% gel to affected area 4 times daily as needed (maximum: 16 g per joint per day) for pain        Ferrous Sulfate (Tab) ferrous sulfate 325 (65 FE) MG  Take 325 mg by mouth every day.        Hydroxychloroquine Sulfate (Tab) PLAQUENIL 200 MG Take 200 mg by mouth every bedtime.        HydrOXYzine HCl (Tab) ATARAX 25 MG Take 0.5-1 Tabs by mouth 3 times a day as needed for Anxiety.        OxyCODONE HCl (Tab) Oxycodone HCl 20 MG Take 1 Tab by mouth every 6 hours as needed.        Pregabalin (Cap) LYRICA 150 MG Take 150 mg by mouth 3 times a day.        Promethazine HCl (Tab) PHENERGAN 25 MG Take 25 mg by mouth every 6 hours as needed for Nausea/Vomiting.        TiZANidine HCl (Tab) ZANAFLEX 4 MG Take 2 Tabs by mouth every bedtime.        TraZODone HCl (Tab) DESYREL 50 MG Take 1 Tab by mouth every bedtime.        .                 Medicines prescribed today were sent to:     [id number] - [place] - [address] [facility]    [address] [place] [address]    Phone: [phone] Fax: [phone]    Open 24 Hours?: No    [id number] - [place], [place] - [address]    [address] [facility] [address] [address]    Phone: [phone] Fax: [phone]    Open 24 Hours?: Yes      Medication refill instructions:       If your prescription bottle indicates you have medication refills left, it is not necessary to call your provider’s office. Please contact your pharmacy and they will refill your medication.    If your prescription bottle indicates you do not have any refills left, you may request refills at any time through one of the following ways: The online Actimagine system (except Urgent Care), by calling your provider’s office, or by asking your pharmacy to contact your provider’s office with a refill request. Medication refills are processed only during regular business hours and may not be available until the next business day. Your provider may request additional information or to have a follow-up visit with you prior to refilling your medication.   *Please Note: Medication refills are assigned a new Rx number when refilled  electronically. Your pharmacy may indicate that no refills were authorized even though a new prescription for the same medication is available at the pharmacy. Please request the medicine by name with the pharmacy before contacting your provider for a refill.        Your To Do List     Future Labs/Procedures Complete By Expires    DX-ANKLE 3+ VIEWS RIGHT  As directed 8/15/2018    DX-KNEE 3 VIEWS RIGHT  As directed 8/15/2018    LE ART/IHSAN  As directed 2/15/2018      Referral     A referral request has been sent to our patient care coordination department. Please allow 3-5 business days for us to process this request and contact you either by phone or mail. If you do not hear from us by the 5th business day, please call us at [phone].           ONEHOPE Access Code: [email or web address]  Expires: 8/27/2017  4:10 AM    ONEHOPE  A secure, online tool to manage your health information     [employer]’s ONEHOPE® is a secure, online tool that connects you to your personalized health information from the privacy of your home -- day or night - making it very easy for you to manage your healthcare. Once the activation process is completed, you can even access your medical information using the ONEHOPE ayaan, which is available for free in the Apple Ayaan store or Google Play store.     ONEHOPE provides the following levels of access (as shown below):   My Chart Features   [facility] Doctor [facility]  Specialists [facility]  Urgent  Care Non-[facility]  Primary Care  Doctor   Email your healthcare team securely and privately 24/7 X X X    Manage appointments: schedule your next appointment; view details of past/upcoming appointments X      Request prescription refills. X      View recent personal medical records, including lab and immunizations X X X X   View health record, including health history, allergies, medications X X X X   Read reports about your outpatient visits, procedures, consult and ER notes X X X X   See your  discharge summary, which is a recap of your hospital and/or ER visit that includes your diagnosis, lab results, and care plan. X X       How to register for Domainex:  1. Go to  [email or web address].  2. Click on the Sign Up Now box, which takes you to the New Member Sign Up page. You will need to provide the following information:  a. Enter your Domainex Access Code exactly as it appears at the top of this page. (You will not need to use this code after you’ve completed the sign-up process. If you do not sign up before the expiration date, you must request a new code.)   b. Enter your date of birth.   c. Enter your home email address.   d. Click Submit, and follow the next screen’s instructions.  3. Create a Domainex ID. This will be your Domainex login ID and cannot be changed, so think of one that is secure and easy to remember.  4. Create a Domainex password. You can change your password at any time.  5. Enter your Password Reset Question and Answer. This can be used at a later time if you forget your password.   6. Enter your e-mail address. This allows you to receive e-mail notifications when new information is available in Domainex.  7. Click Sign Up. You can now view your health information.    For assistance activating your Domainex account, call [phone]

## 2017-08-22 ENCOUNTER — HOSPITAL ENCOUNTER (OUTPATIENT)
Dept: RADIOLOGY | Facility: MEDICAL CENTER | Age: 37
End: 2017-08-22
Attending: FAMILY MEDICINE
Payer: MEDICARE

## 2017-09-06 ENCOUNTER — PATIENT OUTREACH (OUTPATIENT)
Dept: HEALTH INFORMATION MANAGEMENT | Facility: OTHER | Age: 37
End: 2017-09-06

## 2017-09-09 ENCOUNTER — HOSPITAL ENCOUNTER (EMERGENCY)
Facility: MEDICAL CENTER | Age: 37
End: 2017-09-09
Attending: EMERGENCY MEDICINE
Payer: MEDICARE

## 2017-09-09 VITALS
BODY MASS INDEX: 27.81 KG/M2 | RESPIRATION RATE: 16 BRPM | HEIGHT: 65 IN | WEIGHT: 166.89 LBS | TEMPERATURE: 98 F | HEART RATE: 72 BPM | OXYGEN SATURATION: 94 % | SYSTOLIC BLOOD PRESSURE: 157 MMHG | DIASTOLIC BLOOD PRESSURE: 105 MMHG

## 2017-09-09 DIAGNOSIS — G62.9 NEUROPATHY: ICD-10-CM

## 2017-09-09 PROCEDURE — 99283 EMERGENCY DEPT VISIT LOW MDM: CPT

## 2017-09-09 PROCEDURE — 700111 HCHG RX REV CODE 636 W/ 250 OVERRIDE (IP): Performed by: EMERGENCY MEDICINE

## 2017-09-09 PROCEDURE — 96372 THER/PROPH/DIAG INJ SC/IM: CPT

## 2017-09-09 RX ORDER — ONDANSETRON 4 MG/1
4 TABLET, ORALLY DISINTEGRATING ORAL ONCE
Status: COMPLETED | OUTPATIENT
Start: 2017-09-09 | End: 2017-09-09

## 2017-09-09 RX ADMIN — HYDROMORPHONE HYDROCHLORIDE 1.5 MG: 1 INJECTION, SOLUTION INTRAMUSCULAR; INTRAVENOUS; SUBCUTANEOUS at 18:30

## 2017-09-09 RX ADMIN — ONDANSETRON 4 MG: 4 TABLET, ORALLY DISINTEGRATING ORAL at 18:26

## 2017-09-09 ASSESSMENT — PAIN SCALES - GENERAL: PAINLEVEL_OUTOF10: 9

## 2017-09-09 ASSESSMENT — LIFESTYLE VARIABLES: DO YOU DRINK ALCOHOL: NO

## 2017-09-10 ENCOUNTER — PATIENT OUTREACH (OUTPATIENT)
Dept: HEALTH INFORMATION MANAGEMENT | Facility: OTHER | Age: 37
End: 2017-09-10

## 2017-09-10 NOTE — PROGRESS NOTES
Placed discharge outreach phone call to patient s/p ER discharge 9/9/17.  Left voicemail providing my contact information and instructions to call with any questions or concerns.

## 2017-09-10 NOTE — DISCHARGE INSTRUCTIONS
Neuropathic Pain  We often think that pain has a physical cause. If we get rid of the cause, the pain should go away. However, nerves themselves can also cause pain. This pain often does not go away easily. It is called neuropathic pain, which means nerve abnormality. It may be difficult to understand for the patients who have it and for the treating caregivers.  Neuropathic pain may be caused by an injury or failure of the nervous system. The pain often results from an injury, but this injury may or may not be the cause of actual damage to the nervous system. Nerves can be penetrated or squashed by tumors, strangled by scar tissue, or become inflamed due to infection. Neuropathic pain is often caused by nerve injury due to diabetes. Alcohol abuse may also lead to neuropathic pain. Neuropathic pain often seems to have no cause. The diagnosis is made when no other cause is found.   Pain may persist for months or years following the healing of damaged tissues. When this happens, pain signals no longer sound an alarm about current injuries or injuries about to happen. Instead, the alarm system itself is not working correctly.   CHARACTERISTICS OF NEUROPATHIC PAIN  · Severe, sharp, electric shock-like, shooting, lightening-like, knife-like.   · Pins and needles sensation.   · Deep burning, deep cold, or deep ache.   · Persistent numbness, tingling, or weakness.   · Pain resulting from a light touch or other stimulus that would not usually cause pain.   · Increased sensitivity to something that would normally cause pain, such as a pinprick.   Neuropathic pain may get worse instead of better over time. For some people, it can lead to serious disability. It is important to be aware that severe injury in a limb can occur without a proper, protective pain response. Burns, cuts, and other injuries may go unnoticed. Without proper treatment, these injuries can become infected or lead to further disability. Take any injury  seriously, and consult your caregiver for treatment.  TREATMENT   Neuropathic pain is often long-lasting and tends not to get better when treated with opioids (narcotic types of pain medicine). It may respond well to other drugs such as antiseizure and antidepressant medicines. Usually, neuropathic pain does not completely go away, but partial improvement is often possible with proper treatment. Your caregivers, along with you, will select the medicines which best allow you to live a normal life. Do not be discouraged if you do not get immediate relief. Sometimes different medicines or a combination of medicines will be tried before you receive the benefits you are hoping for.  SEEK IMMEDIATE MEDICAL CARE IF:  · There is a sudden change in the quality of your pain, especially if the change is on only one side of the body.   · You notice changes of the skin, such as redness, black or purple discoloration, swelling, or an ulcer.   · You cannot move the affected limbs.   Document Released: 08/10/2005 Document Revised: 03/11/2013 Document Reviewed: 01/26/2012  RealD® Patient Information ©2013 [employer].

## 2017-09-10 NOTE — ED PROVIDER NOTES
ED Provider Note    Scribed for Parvez Nicole M.D. by Foreign Simms. 9/9/2017, 5:58 PM.    Primary care provider: Sushila Manzano M.D.  Means of arrival: Walk in  History obtained from: Patient  History limited by: None    CHIEF COMPLAINT  Chief Complaint   Patient presents with   • Leg Pain     patient reports right leg pain from knee to ankle x 1 month. patient reports increasing pain.    • Back Pain     x 1 month, pt reports hx of lupus.     HPI  Debby Carrizales is a 36 y.o. Female with a history of lupus and arthritis who presents to the Emergency Department complaining of worsening and severe right leg pain, onset last night. Her pain radiates from the top of her foot up to her knee. She reports that her symptoms started with numbness and have increased to a severe pain. She had a similar episode one month ago and had an extensive work up. Patient endorses associated severe and worsened bilateral flank pain. This is a dialysis patient. Per patient this pain feels similar to he history of neuropathy. She denies associated focal weakness. Patient has no alleviating factors for her pain. She takes Lyrica with minimal relief of her pain. Patient notes that she has not had any recent falls or trauma.     REVIEW OF SYSTEMS  Pertinent positives include back pain, leg pain, numbness, and tingling.  Pertinent negatives include no fever, leg swelling, focal weakness, falls, trauma, or vomiting.    E.    PAST MEDICAL HISTORY   has a past medical history of Arthritis; Avascular necrosis of bones of both hips (HCC) (10/10/2016); Clostridium difficile colitis (5/3/2011); Dialysis patient; ESBL (extended spectrum beta-lactamase) producing bacteria infection (8/25/2014); Fibromyalgia; Hypertension; Lupus (CMS-[facility]); Pneumonia (); Psychiatric disorder; and Renal failure.    SURGICAL HISTORY   has a past surgical history that includes gastroscopy-endo (4/10/2011); sclerotheraphy (4/10/2011); gastroscopy-endo  (4/18/2011); colonoscopy with biopsy (4/20/2011); gastroscopy-endo (4/27/2011); other (5/2011); other abdominal surgery; av fistula creation (9/9/2014); cath placement (9/9/2014); av fistula creation (11/14/2014); av fistula creation (2/3/2015); hip arthroplasty total (Right, 1/18/2016); closed reduction (Right, 7/5/2016); av fistula creation (Right, 7/12/2016); thrombectomy (Right, 8/20/2016); thrombectomy (Right, 8/21/2016); angioplasty balloon (8/21/2016); and tracheostomy.    SOCIAL HISTORY  Social History   Substance Use Topics   • Smoking status: Former Smoker     Packs/day: 0.50     Years: 18.00     Types: Cigarettes     Quit date: 6/13/2011   • Smokeless tobacco: Never Used      Comment: 1/2 ppd   • Alcohol use No      Comment: occ      History   Drug Use No     FAMILY HISTORY  History reviewed. No pertinent family history.    CURRENT MEDICATIONS  Home Medications     Reviewed by Rosamaria Arroyo R.N. (Registered Nurse) on 09/09/17 at 1731  Med List Status: Complete   Medication Last Dose Status   amlodipine (NORVASC) 5 MG Tab 9/8/2017 Active   ascorbic acid (ASCORBIC ACID) 500 MG Tab 9/9/2017 Active   buPROPion (WELLBUTRIN XL) 150 MG XL tablet 9/9/2017 Active   cholecalciferol (VITAMIN D3) 5000 UNIT Cap 9/9/2017 Active   Diclofenac Sodium (VOLTAREN) 1 % Gel 9/9/2017 Active   ferrous sulfate 325 (65 FE) MG tablet 9/9/2017 Active   hydroxychloroquine (PLAQUENIL) 200 MG Tab 9/8/2017 Active   hydrOXYzine (ATARAX) 25 MG Tab 9/8/2017 Active   Oxycodone HCl 20 MG Tab 9/9/2017 Active   pregabalin (LYRICA) 150 MG Cap 9/9/2017 Active   promethazine (PHENERGAN) 25 MG Tab 9/9/2017 Active   tizanidine (ZANAFLEX) 4 MG Tab 9/8/2017 Active   trazodone (DESYREL) 50 MG Tab 9/8/2017 Active              ALLERGIES  Allergies   Allergen Reactions   • Ativan Anxiety     Patient becomes severely paranoid and agitated   • Morphine Itching     Tolerates Dilaudid   • Seasonal Runny Nose and Itching     Hay fever, sabiha brush  "    PHYSICAL EXAM  VITAL SIGNS: BP (!) 166/98 Comment: verified  Pulse 80   Temp 36.7 °C (98 °F) (Temporal)   Resp 12   Ht 1.651 m (5' 5\")   Wt 75.7 kg (166 lb 14.2 oz)   LMP 08/17/2017   SpO2 98%   BMI 27.77 kg/m²     Constitutional: Well developed, Well nourished, No acute distress, Non-toxic appearance.   HENT: Normocephalic, Atraumatic, mucous membranes moist, no erythema, exudates, swelling, or masses, nares patent  Eyes: nonicteric  Neck: Supple, no meningismus  Lymphatic: No lymphadenopathy noted.   Cardiovascular: Regular rate and rhythm, no gallops rubs or murmurs  Lungs: Clear bilaterally   Abdomen: Bowel sounds normal, Soft, No tenderness  Skin: Warm, Dry, no rash  Back: No tenderness, No CVA tenderness.   Genitalia: Deferred  Rectal: Deferred  Extremities: No edema, 2+ pulses in the BLE, good capillary refill.  Neurologic: Alert, appropriate, follows commands, moving all extremities, normal speech, RLE with 5/5 motor, sensation subjectively diminished.   Psychiatric: Affect normal     DIAGNOSTIC STUDIES / PROCEDURES  None    COURSE & MEDICAL DECISION MAKING  Nursing notes, VS, PMSFHx reviewed in chart.     5:58 PM Patient seen and examined at bedside. The patient presents with leg pain and chronic back pain and the differential diagnosis includes but is not limited to neuropathy.  Patient was treated with Dilaudid and Zofran for her symptoms. I discussed with her the plan to treat her pain. She understands that she will need to follow up with a pain specialist to better address her symptoms as I do not think there is much that can be done in the ED since she has had such an extensive work up. She notes that her Rheumatologist is currently getting her established with a pain specialist.    6:10 PM Reviewed old medical records that showed the final impression of MRI back and DVT study were performed and were negative. Patient will be treated for neuropathy.     6:36 PM Recheck with the patient. I " discussed with her that I reviewed her MRI and DVT studies which were normal. She understands the plan to follow up with Neurology and her scheduled pain specialist.     Decision Making:  This is a 36 y.o. year old female who presents with leg pain on the right side that appears to be mostly from the knee down. It has been there for the last month. She has no weakness. She was evaluated for this a month ago having a MRI of her lumbar spine as well as a DVT study of her right leg. Both of those were unremarkable. The patient continued to have pain. She has intact pulses in her lower extremity and intact motor and sensory function. She appears to have neuropathic-related pain and will be referred for pain management through her primary as well as to neurology for further testing.  The patient will return for new or worsening symptoms and is stable at the time of discharge.    The patient is referred to a primary physician for blood pressure management, diabetic screening, and for all other preventative health concerns.    DISPOSITION:  Patient will be discharged home in stable condition.    FOLLOW UP:  [facility].     FINAL IMPRESSION  Neuropathy      Foreign BARGER ([place]), am scribing for, and in the presence of, Parvez Nicole M.D..    Electronically signed by: Foreign Simms ([place]), 9/9/2017    Parvez BARGER M.D. personally performed the services described in this documentation, as scribed by Foreign Simms in my presence, and it is both accurate and complete.    The note accurately reflects work and decisions made by me.  Parvez Nicole  9/9/2017  7:13 PM

## 2017-09-10 NOTE — ED NOTES
".Debby Carrizales  .  Chief Complaint   Patient presents with   • Leg Pain     patient reports right leg pain from knee to ankle x 1 month. patient reports increasing pain.    • Back Pain     x 1 month, pt reports hx of lupus.     Patient ambulatory to triage with above complaint. Patient reports no relief from home pain meds. .BP (!) 166/98 Comment: verified  Pulse 80   Temp 36.7 °C (98 °F) (Temporal)   Resp 12   Ht 1.651 m (5' 5\")   Wt 75.7 kg (166 lb 14.2 oz)   LMP 08/17/2017   SpO2 98%   BMI 27.77 kg/m²     Patient to lobby and instructed to inform staff of any needs.   "

## 2017-09-10 NOTE — ED NOTES
ERP aware of pt BP.  Discharge instructions given.  All questions answered.  Pt to follow-up with PCP and neurologist.  Pt verbalized understanding.  All belongings with pt.  Pt ambulated with use of cane to lobby.

## 2017-09-10 NOTE — ED NOTES
C/o pain begins in R foot that radiates upward initially started as numbness prior to last visit to ER, using cane today due to pain, no known inj /trauma

## 2017-09-14 ENCOUNTER — PATIENT OUTREACH (OUTPATIENT)
Dept: HEALTH INFORMATION MANAGEMENT | Facility: OTHER | Age: 37
End: 2017-09-14

## 2017-09-14 NOTE — PROGRESS NOTES
Patient Debby Carrizales discharged on 8/4/17.  During the program, [facility] Patient Advocate assisted with multiple discharge orders including 2 scheduled appointments with the discharge clinic and with her PCP. Debby was a no show for the discharge clinic but followed up with her PCP on 8/15/17.  PT had wheelchair prior to admission. [facility] confirmed that all discharge medications were successfully filled.  No future appts are schx'd.

## 2017-09-15 ENCOUNTER — HOSPITAL ENCOUNTER (OUTPATIENT)
Facility: MEDICAL CENTER | Age: 37
End: 2017-09-15
Attending: SPECIALIST
Payer: MEDICARE

## 2017-09-15 PROCEDURE — 80307 DRUG TEST PRSMV CHEM ANLYZR: CPT

## 2017-09-16 PROCEDURE — 80307 DRUG TEST PRSMV CHEM ANLYZR: CPT

## 2017-09-18 NOTE — PROGRESS NOTES
Outcome: Left Message    Please transfer to Patient Outreach Team at [phone] when patient returns call.    Attempt # 2

## 2017-09-19 NOTE — PROGRESS NOTES
Outcome: Left Message    Please transfer to Patient Outreach Team at [phone] when patient returns call.      Attempt # 3

## 2017-09-20 LAB
6MAM UR QL: NOT DETECTED
7AMINOCLONAZEPAM UR QL: NOT DETECTED
A-OH ALPRAZ UR QL: NOT DETECTED
ALPRAZ UR QL: NOT DETECTED
AMPHET UR QL SCN: NOT DETECTED
ANNOTATION COMMENT IMP: NORMAL
ANNOTATION COMMENT IMP: NORMAL
BARBITURATES UR QL: NOT DETECTED
BUPRENORPHINE UR QL: NOT DETECTED
BZE UR QL: NOT DETECTED
CARBOXYTHC UR QL: NOT DETECTED
CARISOPRODOL UR QL: NOT DETECTED
CLONAZEPAM UR QL: NOT DETECTED
CODEINE UR QL: NOT DETECTED
DIAZEPAM UR QL: NOT DETECTED
ETHYL GLUCURONIDE UR QL: NOT DETECTED
FENTANYL UR QL: NOT DETECTED
HYDROCODONE UR QL: NOT DETECTED
HYDROMORPHONE UR QL: NOT DETECTED
LORAZEPAM UR QL: NOT DETECTED
MDA UR QL: NOT DETECTED
MDEA UR QL: NOT DETECTED
MDMA UR QL: NOT DETECTED
MEPERIDINE UR QL: NOT DETECTED
METHADONE UR QL: NOT DETECTED
METHAMPHET UR QL: NOT DETECTED
MIDAZOLAM UR QL SCN: NOT DETECTED
MORPHINE UR QL: NOT DETECTED
NORBUPRENORPHINE UR QL CFM: NOT DETECTED
NORDIAZEPAM UR QL: NOT DETECTED
NORFENTANYL UR QL: NOT DETECTED
NORHYDROCODONE UR QL CFM: NOT DETECTED
NOROXYCODONE UR QL CFM: PRESENT
NOROXYMORPH CO100 Q0458: NOT DETECTED
OXAZEPAM UR QL: NOT DETECTED
OXYCODONE UR QL: NOT DETECTED
OXYMORPHONE UR QL: NOT DETECTED
PATHOLOGY STUDY: NORMAL
PCP UR QL: NOT DETECTED
PHENTERMINE UR QL: NOT DETECTED
PPAA UR QL: NOT DETECTED
PROPOXYPH UR QL: NOT DETECTED
SERVICE CMNT-IMP: NORMAL
TAPENADOL OSULF CO200 Q0473: NOT DETECTED
TAPENTADOL UR QL SCN: NOT DETECTED
TEMAZEPAM UR QL: NOT DETECTED
TRAMADOL UR QL: NOT DETECTED
ZOLPIDEM UR QL: NOT DETECTED

## 2017-09-20 NOTE — PROGRESS NOTES
Outcome: Left Message    Please transfer to Patient Outreach Team at [phone] when patient returns call.      Attempt # 4

## 2017-11-07 ENCOUNTER — ANTICOAGULATION MONITORING (OUTPATIENT)
Dept: VASCULAR LAB | Facility: MEDICAL CENTER | Age: 37
End: 2017-11-07

## 2017-11-07 NOTE — PROGRESS NOTES
Discharged from [facility].  Pt no longer anticoagulated  Lili Mathew, PharmD

## 2017-11-21 ENCOUNTER — HOSPITAL ENCOUNTER (INPATIENT)
Facility: MEDICAL CENTER | Age: 37
LOS: 3 days | DRG: 252 | End: 2017-11-24
Attending: EMERGENCY MEDICINE | Admitting: HOSPITALIST
Payer: MEDICARE

## 2017-11-21 ENCOUNTER — APPOINTMENT (OUTPATIENT)
Dept: RADIOLOGY | Facility: MEDICAL CENTER | Age: 37
DRG: 252 | End: 2017-11-21
Attending: HOSPITALIST
Payer: MEDICARE

## 2017-11-21 ENCOUNTER — RESOLUTE PROFESSIONAL BILLING HOSPITAL PROF FEE (OUTPATIENT)
Dept: HOSPITALIST | Facility: MEDICAL CENTER | Age: 37
End: 2017-11-21
Payer: MEDICARE

## 2017-11-21 ENCOUNTER — APPOINTMENT (OUTPATIENT)
Dept: RADIOLOGY | Facility: MEDICAL CENTER | Age: 37
DRG: 252 | End: 2017-11-21
Attending: SURGERY
Payer: MEDICARE

## 2017-11-21 ENCOUNTER — APPOINTMENT (OUTPATIENT)
Dept: RADIOLOGY | Facility: MEDICAL CENTER | Age: 37
DRG: 252 | End: 2017-11-21
Attending: EMERGENCY MEDICINE
Payer: MEDICARE

## 2017-11-21 DIAGNOSIS — L98.8 FISTULA: ICD-10-CM

## 2017-11-21 DIAGNOSIS — N39.0 URINARY TRACT INFECTION WITHOUT HEMATURIA, SITE UNSPECIFIED: ICD-10-CM

## 2017-11-21 DIAGNOSIS — A41.9 SEPSIS, DUE TO UNSPECIFIED ORGANISM: ICD-10-CM

## 2017-11-21 PROBLEM — R79.89 ELEVATED TROPONIN: Status: ACTIVE | Noted: 2017-11-21

## 2017-11-21 PROBLEM — R11.2 NAUSEA & VOMITING: Status: ACTIVE | Noted: 2017-11-21

## 2017-11-21 PROBLEM — M87.00 AVASCULAR NECROSIS (HCC): Status: ACTIVE | Noted: 2017-11-21

## 2017-11-21 PROBLEM — N12 PYELONEPHRITIS: Status: ACTIVE | Noted: 2017-11-21

## 2017-11-21 LAB
ALBUMIN SERPL BCP-MCNC: 3.7 G/DL (ref 3.2–4.9)
ALBUMIN/GLOB SERPL: 1.3 G/DL
ALP SERPL-CCNC: 85 U/L (ref 30–99)
ALT SERPL-CCNC: 17 U/L (ref 2–50)
ANION GAP SERPL CALC-SCNC: 24 MMOL/L (ref 0–11.9)
APPEARANCE UR: ABNORMAL
AST SERPL-CCNC: 12 U/L (ref 12–45)
BACTERIA #/AREA URNS HPF: ABNORMAL /HPF
BASOPHILS # BLD AUTO: 0.6 % (ref 0–1.8)
BASOPHILS # BLD AUTO: 0.6 % (ref 0–1.8)
BASOPHILS # BLD: 0.04 K/UL (ref 0–0.12)
BASOPHILS # BLD: 0.06 K/UL (ref 0–0.12)
BILIRUB SERPL-MCNC: 0.5 MG/DL (ref 0.1–1.5)
BILIRUB UR QL STRIP.AUTO: NEGATIVE
BUN SERPL-MCNC: 97 MG/DL (ref 8–22)
CALCIUM SERPL-MCNC: 9.3 MG/DL (ref 8.5–10.5)
CHLORIDE SERPL-SCNC: 94 MMOL/L (ref 96–112)
CO2 SERPL-SCNC: 20 MMOL/L (ref 20–33)
COLOR UR: YELLOW
CREAT SERPL-MCNC: 10.91 MG/DL (ref 0.5–1.4)
EOSINOPHIL # BLD AUTO: 0.16 K/UL (ref 0–0.51)
EOSINOPHIL # BLD AUTO: 0.23 K/UL (ref 0–0.51)
EOSINOPHIL NFR BLD: 2.4 % (ref 0–6.9)
EOSINOPHIL NFR BLD: 2.5 % (ref 0–6.9)
EPI CELLS #/AREA URNS HPF: ABNORMAL /HPF
ERYTHROCYTE [DISTWIDTH] IN BLOOD BY AUTOMATED COUNT: 46.5 FL (ref 35.9–50)
ERYTHROCYTE [DISTWIDTH] IN BLOOD BY AUTOMATED COUNT: 46.8 FL (ref 35.9–50)
GFR SERPL CREATININE-BSD FRML MDRD: 4 ML/MIN/1.73 M 2
GLOBULIN SER CALC-MCNC: 2.9 G/DL (ref 1.9–3.5)
GLUCOSE SERPL-MCNC: 91 MG/DL (ref 65–99)
GLUCOSE UR STRIP.AUTO-MCNC: NEGATIVE MG/DL
HCG SERPL QL: NEGATIVE
HCT VFR BLD AUTO: 34.9 % (ref 37–47)
HCT VFR BLD AUTO: 40.5 % (ref 37–47)
HGB BLD-MCNC: 11.9 G/DL (ref 12–16)
HGB BLD-MCNC: 13.7 G/DL (ref 12–16)
HYALINE CASTS #/AREA URNS LPF: ABNORMAL /LPF
IMM GRANULOCYTES # BLD AUTO: 0.02 K/UL (ref 0–0.11)
IMM GRANULOCYTES # BLD AUTO: 0.04 K/UL (ref 0–0.11)
IMM GRANULOCYTES NFR BLD AUTO: 0.3 % (ref 0–0.9)
IMM GRANULOCYTES NFR BLD AUTO: 0.4 % (ref 0–0.9)
KETONES UR STRIP.AUTO-MCNC: NEGATIVE MG/DL
LACTATE BLD-SCNC: 1.2 MMOL/L (ref 0.5–2)
LACTATE BLD-SCNC: 1.5 MMOL/L (ref 0.5–2)
LEUKOCYTE ESTERASE UR QL STRIP.AUTO: ABNORMAL
LYMPHOCYTES # BLD AUTO: 1.29 K/UL (ref 1–4.8)
LYMPHOCYTES # BLD AUTO: 1.3 K/UL (ref 1–4.8)
LYMPHOCYTES NFR BLD: 13.4 % (ref 22–41)
LYMPHOCYTES NFR BLD: 20.1 % (ref 22–41)
MCH RBC QN AUTO: 32 PG (ref 27–33)
MCH RBC QN AUTO: 33.1 PG (ref 27–33)
MCHC RBC AUTO-ENTMCNC: 33.8 G/DL (ref 33.6–35)
MCHC RBC AUTO-ENTMCNC: 34.1 G/DL (ref 33.6–35)
MCV RBC AUTO: 94.6 FL (ref 81.4–97.8)
MCV RBC AUTO: 96.9 FL (ref 81.4–97.8)
MICRO URNS: ABNORMAL
MONOCYTES # BLD AUTO: 0.8 K/UL (ref 0–0.85)
MONOCYTES # BLD AUTO: 1 K/UL (ref 0–0.85)
MONOCYTES NFR BLD AUTO: 15.5 % (ref 0–13.4)
MONOCYTES NFR BLD AUTO: 8.3 % (ref 0–13.4)
NEUTROPHILS # BLD AUTO: 3.95 K/UL (ref 2–7.15)
NEUTROPHILS # BLD AUTO: 7.19 K/UL (ref 2–7.15)
NEUTROPHILS NFR BLD: 61 % (ref 44–72)
NEUTROPHILS NFR BLD: 74.9 % (ref 44–72)
NITRITE UR QL STRIP.AUTO: NEGATIVE
NRBC # BLD AUTO: 0 K/UL
NRBC # BLD AUTO: 0 K/UL
NRBC BLD AUTO-RTO: 0 /100 WBC
NRBC BLD AUTO-RTO: 0 /100 WBC
PH UR STRIP.AUTO: 8.5 [PH]
PLATELET # BLD AUTO: 117 K/UL (ref 164–446)
PLATELET # BLD AUTO: 141 K/UL (ref 164–446)
PMV BLD AUTO: 10.8 FL (ref 9–12.9)
PMV BLD AUTO: 10.9 FL (ref 9–12.9)
POTASSIUM SERPL-SCNC: 4.4 MMOL/L (ref 3.6–5.5)
PROT SERPL-MCNC: 6.6 G/DL (ref 6–8.2)
PROT UR QL STRIP: 100 MG/DL
RBC # BLD AUTO: 3.6 M/UL (ref 4.2–5.4)
RBC # BLD AUTO: 4.28 M/UL (ref 4.2–5.4)
RBC # URNS HPF: ABNORMAL /HPF
RBC UR QL AUTO: ABNORMAL
SODIUM SERPL-SCNC: 138 MMOL/L (ref 135–145)
SP GR UR STRIP.AUTO: 1.01
UROBILINOGEN UR STRIP.AUTO-MCNC: 0.2 MG/DL
WBC # BLD AUTO: 6.5 K/UL (ref 4.8–10.8)
WBC # BLD AUTO: 9.6 K/UL (ref 4.8–10.8)
WBC #/AREA URNS HPF: ABNORMAL /HPF

## 2017-11-21 PROCEDURE — 81001 URINALYSIS AUTO W/SCOPE: CPT

## 2017-11-21 PROCEDURE — 71010 DX-CHEST-LIMITED (1 VIEW): CPT

## 2017-11-21 PROCEDURE — 99285 EMERGENCY DEPT VISIT HI MDM: CPT

## 2017-11-21 PROCEDURE — 84703 CHORIONIC GONADOTROPIN ASSAY: CPT

## 2017-11-21 PROCEDURE — 700117 HCHG RX CONTRAST REV CODE 255: Performed by: SURGERY

## 2017-11-21 PROCEDURE — 99223 1ST HOSP IP/OBS HIGH 75: CPT | Mod: AI | Performed by: HOSPITALIST

## 2017-11-21 PROCEDURE — 700111 HCHG RX REV CODE 636 W/ 250 OVERRIDE (IP): Performed by: HOSPITALIST

## 2017-11-21 PROCEDURE — 96374 THER/PROPH/DIAG INJ IV PUSH: CPT

## 2017-11-21 PROCEDURE — 93005 ELECTROCARDIOGRAM TRACING: CPT | Performed by: HOSPITALIST

## 2017-11-21 PROCEDURE — 700101 HCHG RX REV CODE 250: Performed by: HOSPITALIST

## 2017-11-21 PROCEDURE — 84484 ASSAY OF TROPONIN QUANT: CPT

## 2017-11-21 PROCEDURE — 87040 BLOOD CULTURE FOR BACTERIA: CPT | Mod: 91

## 2017-11-21 PROCEDURE — 05CB3ZZ EXTIRPATION OF MATTER FROM RIGHT BASILIC VEIN, PERCUTANEOUS APPROACH: ICD-10-PCS | Performed by: SURGERY

## 2017-11-21 PROCEDURE — 700111 HCHG RX REV CODE 636 W/ 250 OVERRIDE (IP)

## 2017-11-21 PROCEDURE — 96375 TX/PRO/DX INJ NEW DRUG ADDON: CPT

## 2017-11-21 PROCEDURE — 94760 N-INVAS EAR/PLS OXIMETRY 1: CPT

## 2017-11-21 PROCEDURE — 80053 COMPREHEN METABOLIC PANEL: CPT

## 2017-11-21 PROCEDURE — 87086 URINE CULTURE/COLONY COUNT: CPT

## 2017-11-21 PROCEDURE — 93990 DOPPLER FLOW TESTING: CPT

## 2017-11-21 PROCEDURE — A9270 NON-COVERED ITEM OR SERVICE: HCPCS | Performed by: HOSPITALIST

## 2017-11-21 PROCEDURE — 99153 MOD SED SAME PHYS/QHP EA: CPT

## 2017-11-21 PROCEDURE — 05C70ZZ EXTIRPATION OF MATTER FROM RIGHT AXILLARY VEIN, OPEN APPROACH: ICD-10-PCS | Performed by: SURGERY

## 2017-11-21 PROCEDURE — 03C73ZZ EXTIRPATION OF MATTER FROM RIGHT BRACHIAL ARTERY, PERCUTANEOUS APPROACH: ICD-10-PCS | Performed by: SURGERY

## 2017-11-21 PROCEDURE — 700105 HCHG RX REV CODE 258: Performed by: SURGERY

## 2017-11-21 PROCEDURE — 700111 HCHG RX REV CODE 636 W/ 250 OVERRIDE (IP): Performed by: SURGERY

## 2017-11-21 PROCEDURE — 85025 COMPLETE CBC W/AUTO DIFF WBC: CPT | Mod: 91

## 2017-11-21 PROCEDURE — 71010 DX-CHEST-PORTABLE (1 VIEW): CPT

## 2017-11-21 PROCEDURE — 83605 ASSAY OF LACTIC ACID: CPT | Mod: 91

## 2017-11-21 PROCEDURE — 700102 HCHG RX REV CODE 250 W/ 637 OVERRIDE(OP): Performed by: HOSPITALIST

## 2017-11-21 PROCEDURE — 93010 ELECTROCARDIOGRAM REPORT: CPT | Performed by: INTERNAL MEDICINE

## 2017-11-21 PROCEDURE — 700111 HCHG RX REV CODE 636 W/ 250 OVERRIDE (IP): Performed by: EMERGENCY MEDICINE

## 2017-11-21 PROCEDURE — 700105 HCHG RX REV CODE 258: Performed by: EMERGENCY MEDICINE

## 2017-11-21 PROCEDURE — 770006 HCHG ROOM/CARE - MED/SURG/GYN SEMI*

## 2017-11-21 RX ORDER — HEPARIN SODIUM 1000 [USP'U]/ML
3000 INJECTION, SOLUTION INTRAVENOUS; SUBCUTANEOUS ONCE
Status: COMPLETED | OUTPATIENT
Start: 2017-11-21 | End: 2017-11-21

## 2017-11-21 RX ORDER — TIZANIDINE 4 MG/1
8 TABLET ORAL
Status: DISCONTINUED | OUTPATIENT
Start: 2017-11-21 | End: 2017-11-24 | Stop reason: HOSPADM

## 2017-11-21 RX ORDER — MIDAZOLAM HYDROCHLORIDE 1 MG/ML
.5-2 INJECTION INTRAMUSCULAR; INTRAVENOUS PRN
Status: ACTIVE | OUTPATIENT
Start: 2017-11-21 | End: 2017-11-21

## 2017-11-21 RX ORDER — IODIXANOL 270 MG/ML
100 INJECTION, SOLUTION INTRAVASCULAR ONCE
Status: COMPLETED | OUTPATIENT
Start: 2017-11-21 | End: 2017-11-21

## 2017-11-21 RX ORDER — MIDAZOLAM HYDROCHLORIDE 1 MG/ML
INJECTION INTRAMUSCULAR; INTRAVENOUS
Status: COMPLETED
Start: 2017-11-21 | End: 2017-11-21

## 2017-11-21 RX ORDER — LIDOCAINE 50 MG/G
1 PATCH TOPICAL EVERY 24 HOURS
COMMUNITY
End: 2018-12-04

## 2017-11-21 RX ORDER — HYDROXYZINE HYDROCHLORIDE 25 MG/1
12.5-25 TABLET, FILM COATED ORAL 3 TIMES DAILY PRN
Status: DISCONTINUED | OUTPATIENT
Start: 2017-11-21 | End: 2017-11-24 | Stop reason: HOSPADM

## 2017-11-21 RX ORDER — FERROUS SULFATE 325(65) MG
325 TABLET ORAL DAILY
Status: DISCONTINUED | OUTPATIENT
Start: 2017-11-21 | End: 2017-11-24 | Stop reason: HOSPADM

## 2017-11-21 RX ORDER — ONDANSETRON 2 MG/ML
4 INJECTION INTRAMUSCULAR; INTRAVENOUS ONCE
Status: COMPLETED | OUTPATIENT
Start: 2017-11-21 | End: 2017-11-21

## 2017-11-21 RX ORDER — SODIUM CHLORIDE AND POTASSIUM CHLORIDE 150; 900 MG/100ML; MG/100ML
INJECTION, SOLUTION INTRAVENOUS CONTINUOUS
Status: DISCONTINUED | OUTPATIENT
Start: 2017-11-21 | End: 2017-11-23

## 2017-11-21 RX ORDER — AMLODIPINE BESYLATE 5 MG/1
5 TABLET ORAL
Status: DISCONTINUED | OUTPATIENT
Start: 2017-11-21 | End: 2017-11-24 | Stop reason: HOSPADM

## 2017-11-21 RX ORDER — HEPARIN SODIUM 5000 [USP'U]/ML
5000 INJECTION, SOLUTION INTRAVENOUS; SUBCUTANEOUS EVERY 8 HOURS
Status: DISCONTINUED | OUTPATIENT
Start: 2017-11-21 | End: 2017-11-24 | Stop reason: HOSPADM

## 2017-11-21 RX ORDER — ASCORBIC ACID 500 MG
500 TABLET ORAL DAILY
Status: DISCONTINUED | OUTPATIENT
Start: 2017-11-21 | End: 2017-11-24 | Stop reason: HOSPADM

## 2017-11-21 RX ORDER — OXYCODONE HYDROCHLORIDE 10 MG/1
20 TABLET ORAL EVERY 6 HOURS PRN
Status: DISCONTINUED | OUTPATIENT
Start: 2017-11-21 | End: 2017-11-24 | Stop reason: HOSPADM

## 2017-11-21 RX ORDER — PREGABALIN 75 MG/1
150 CAPSULE ORAL 3 TIMES DAILY
Status: DISCONTINUED | OUTPATIENT
Start: 2017-11-21 | End: 2017-11-24 | Stop reason: HOSPADM

## 2017-11-21 RX ORDER — HYDROMORPHONE HYDROCHLORIDE 2 MG/ML
0.5 INJECTION, SOLUTION INTRAMUSCULAR; INTRAVENOUS; SUBCUTANEOUS
Status: DISCONTINUED | OUTPATIENT
Start: 2017-11-21 | End: 2017-11-24 | Stop reason: HOSPADM

## 2017-11-21 RX ORDER — CEFTRIAXONE 2 G/1
2 INJECTION, POWDER, FOR SOLUTION INTRAMUSCULAR; INTRAVENOUS ONCE
Status: COMPLETED | OUTPATIENT
Start: 2017-11-21 | End: 2017-11-21

## 2017-11-21 RX ORDER — SODIUM CHLORIDE 9 MG/ML
500 INJECTION, SOLUTION INTRAVENOUS ONCE
Status: COMPLETED | OUTPATIENT
Start: 2017-11-21 | End: 2017-11-21

## 2017-11-21 RX ORDER — SODIUM CHLORIDE 9 MG/ML
500 INJECTION, SOLUTION INTRAVENOUS
Status: ACTIVE | OUTPATIENT
Start: 2017-11-21 | End: 2017-11-21

## 2017-11-21 RX ORDER — ACETAMINOPHEN 325 MG/1
650 TABLET ORAL EVERY 6 HOURS PRN
Status: DISCONTINUED | OUTPATIENT
Start: 2017-11-21 | End: 2017-11-24 | Stop reason: HOSPADM

## 2017-11-21 RX ORDER — PROMETHAZINE HYDROCHLORIDE 25 MG/1
25 TABLET ORAL EVERY 6 HOURS PRN
Status: DISCONTINUED | OUTPATIENT
Start: 2017-11-21 | End: 2017-11-24 | Stop reason: HOSPADM

## 2017-11-21 RX ORDER — OXYCODONE HYDROCHLORIDE 20 MG/1
1 TABLET ORAL EVERY 6 HOURS PRN
Status: ON HOLD | COMMUNITY
End: 2017-12-10

## 2017-11-21 RX ORDER — LABETALOL HYDROCHLORIDE 5 MG/ML
10 INJECTION, SOLUTION INTRAVENOUS EVERY 4 HOURS PRN
Status: DISCONTINUED | OUTPATIENT
Start: 2017-11-21 | End: 2017-11-24 | Stop reason: HOSPADM

## 2017-11-21 RX ORDER — HEPARIN SODIUM,PORCINE 1000/ML
VIAL (ML) INJECTION
Status: COMPLETED
Start: 2017-11-21 | End: 2017-11-21

## 2017-11-21 RX ORDER — HYDROXYCHLOROQUINE SULFATE 200 MG/1
200 TABLET, FILM COATED ORAL
Status: DISCONTINUED | OUTPATIENT
Start: 2017-11-21 | End: 2017-11-24 | Stop reason: HOSPADM

## 2017-11-21 RX ORDER — BUPROPION HYDROCHLORIDE 150 MG/1
150 TABLET, EXTENDED RELEASE ORAL EVERY MORNING
Status: DISCONTINUED | OUTPATIENT
Start: 2017-11-21 | End: 2017-11-24 | Stop reason: HOSPADM

## 2017-11-21 RX ORDER — HYDROMORPHONE HYDROCHLORIDE 2 MG/ML
.5-1 INJECTION, SOLUTION INTRAMUSCULAR; INTRAVENOUS; SUBCUTANEOUS
Status: DISCONTINUED | OUTPATIENT
Start: 2017-11-21 | End: 2017-11-21

## 2017-11-21 RX ADMIN — IODIXANOL 44 ML: 270 INJECTION, SOLUTION INTRAVASCULAR at 19:21

## 2017-11-21 RX ADMIN — Medication 325 MG: at 14:53

## 2017-11-21 RX ADMIN — FENTANYL CITRATE 25 MCG: 50 INJECTION, SOLUTION INTRAMUSCULAR; INTRAVENOUS at 18:21

## 2017-11-21 RX ADMIN — HYDROMORPHONE HYDROCHLORIDE 0.5 MG: 2 INJECTION INTRAMUSCULAR; INTRAVENOUS; SUBCUTANEOUS at 16:39

## 2017-11-21 RX ADMIN — PREGABALIN 150 MG: 75 CAPSULE ORAL at 23:03

## 2017-11-21 RX ADMIN — FENTANYL CITRATE 50 MCG: 50 INJECTION, SOLUTION INTRAMUSCULAR; INTRAVENOUS at 18:30

## 2017-11-21 RX ADMIN — MIDAZOLAM 2 MG: 1 INJECTION INTRAMUSCULAR; INTRAVENOUS at 17:40

## 2017-11-21 RX ADMIN — SODIUM CHLORIDE 500 ML: 9 INJECTION, SOLUTION INTRAVENOUS at 08:30

## 2017-11-21 RX ADMIN — MIDAZOLAM 2 MG: 1 INJECTION INTRAMUSCULAR; INTRAVENOUS at 18:37

## 2017-11-21 RX ADMIN — HYDROMORPHONE HYDROCHLORIDE 0.5 MG: 2 INJECTION INTRAMUSCULAR; INTRAVENOUS; SUBCUTANEOUS at 22:03

## 2017-11-21 RX ADMIN — FENTANYL CITRATE 25 MCG: 50 INJECTION, SOLUTION INTRAMUSCULAR; INTRAVENOUS at 17:40

## 2017-11-21 RX ADMIN — HEPARIN SODIUM 3000 UNITS: 1000 INJECTION, SOLUTION INTRAVENOUS; SUBCUTANEOUS at 18:49

## 2017-11-21 RX ADMIN — ONDANSETRON 4 MG: 2 INJECTION INTRAMUSCULAR; INTRAVENOUS at 08:47

## 2017-11-21 RX ADMIN — MIDAZOLAM 2 MG: 1 INJECTION INTRAMUSCULAR; INTRAVENOUS at 17:51

## 2017-11-21 RX ADMIN — TIZANIDINE 8 MG: 4 TABLET ORAL at 23:02

## 2017-11-21 RX ADMIN — FENTANYL CITRATE 25 MCG: 50 INJECTION, SOLUTION INTRAMUSCULAR; INTRAVENOUS at 17:55

## 2017-11-21 RX ADMIN — HEPARIN SODIUM 5000 UNITS: 5000 INJECTION, SOLUTION INTRAVENOUS; SUBCUTANEOUS at 23:03

## 2017-11-21 RX ADMIN — PREGABALIN 150 MG: 75 CAPSULE ORAL at 14:52

## 2017-11-21 RX ADMIN — BUPROPION HYDROCHLORIDE 150 MG: 150 TABLET, EXTENDED RELEASE ORAL at 16:39

## 2017-11-21 RX ADMIN — FENTANYL CITRATE 25 MCG: 50 INJECTION, SOLUTION INTRAMUSCULAR; INTRAVENOUS at 18:18

## 2017-11-21 RX ADMIN — FENTANYL CITRATE 25 MCG: 50 INJECTION, SOLUTION INTRAMUSCULAR; INTRAVENOUS at 18:06

## 2017-11-21 RX ADMIN — CEFTRIAXONE 2 G: 2 INJECTION, POWDER, FOR SOLUTION INTRAMUSCULAR; INTRAVENOUS at 08:30

## 2017-11-21 RX ADMIN — ALTEPLASE 1 MG/HR: KIT at 18:01

## 2017-11-21 RX ADMIN — MIDAZOLAM 2 MG: 1 INJECTION INTRAMUSCULAR; INTRAVENOUS at 18:32

## 2017-11-21 RX ADMIN — FENTANYL CITRATE 50 MCG: 50 INJECTION, SOLUTION INTRAMUSCULAR; INTRAVENOUS at 08:47

## 2017-11-21 RX ADMIN — POTASSIUM CHLORIDE AND SODIUM CHLORIDE: 900; 150 INJECTION, SOLUTION INTRAVENOUS at 21:53

## 2017-11-21 RX ADMIN — HYDROXYCHLOROQUINE SULFATE 200 MG: 200 TABLET, FILM COATED ORAL at 23:06

## 2017-11-21 RX ADMIN — OXYCODONE HYDROCHLORIDE AND ACETAMINOPHEN 500 MG: 500 TABLET ORAL at 14:53

## 2017-11-21 RX ADMIN — FENTANYL CITRATE 25 MCG: 50 INJECTION, SOLUTION INTRAMUSCULAR; INTRAVENOUS at 18:36

## 2017-11-21 RX ADMIN — HYDROMORPHONE HYDROCHLORIDE 0.5 MG: 2 INJECTION INTRAMUSCULAR; INTRAVENOUS; SUBCUTANEOUS at 14:00

## 2017-11-21 ASSESSMENT — ENCOUNTER SYMPTOMS
HEMOPTYSIS: 0
HEADACHES: 0
DIAPHORESIS: 0
FALLS: 0
BLOOD IN STOOL: 0
VOMITING: 1
RESPIRATORY NEGATIVE: 1
BACK PAIN: 0
CLAUDICATION: 0
ABDOMINAL PAIN: 0
BLURRED VISION: 0
TINGLING: 0
CHILLS: 1
EYE PAIN: 0
STRIDOR: 0
BRUISES/BLEEDS EASILY: 0
EYE REDNESS: 0
INSOMNIA: 1
LOSS OF CONSCIOUSNESS: 0
NECK PAIN: 0
WHEEZING: 0
PALPITATIONS: 0
NEUROLOGICAL NEGATIVE: 1
FOCAL WEAKNESS: 0
PND: 0
SENSORY CHANGE: 0
DIZZINESS: 0
DIARRHEA: 0
SEIZURES: 0
SPUTUM PRODUCTION: 0
MYALGIAS: 1
HEARTBURN: 0
TREMORS: 0
NAUSEA: 1
POLYDIPSIA: 0
ORTHOPNEA: 0
FLANK PAIN: 1
EYES NEGATIVE: 1
SINUS PAIN: 0
DOUBLE VISION: 0
COUGH: 0
CARDIOVASCULAR NEGATIVE: 1
MEMORY LOSS: 0
PHOTOPHOBIA: 0
WEIGHT LOSS: 0
SORE THROAT: 0
NERVOUS/ANXIOUS: 0
HALLUCINATIONS: 0
DEPRESSION: 0
EYE DISCHARGE: 0
SHORTNESS OF BREATH: 0
FEVER: 1

## 2017-11-21 ASSESSMENT — COGNITIVE AND FUNCTIONAL STATUS - GENERAL
SUGGESTED CMS G CODE MODIFIER DAILY ACTIVITY: CH
DAILY ACTIVITIY SCORE: 24
SUGGESTED CMS G CODE MODIFIER MOBILITY: CH
MOBILITY SCORE: 24

## 2017-11-21 ASSESSMENT — PATIENT HEALTH QUESTIONNAIRE - PHQ9
1. LITTLE INTEREST OR PLEASURE IN DOING THINGS: MORE THAN HALF THE DAYS
SUM OF ALL RESPONSES TO PHQ QUESTIONS 1-9: 17
3. TROUBLE FALLING OR STAYING ASLEEP OR SLEEPING TOO MUCH: NEARLY EVERY DAY
SUM OF ALL RESPONSES TO PHQ9 QUESTIONS 1 AND 2: 5
7. TROUBLE CONCENTRATING ON THINGS, SUCH AS READING THE NEWSPAPER OR WATCHING TELEVISION: SEVERAL DAYS
2. FEELING DOWN, DEPRESSED, IRRITABLE, OR HOPELESS: NEARLY EVERY DAY
8. MOVING OR SPEAKING SO SLOWLY THAT OTHER PEOPLE COULD HAVE NOTICED. OR THE OPPOSITE, BEING SO FIGETY OR RESTLESS THAT YOU HAVE BEEN MOVING AROUND A LOT MORE THAN USUAL: NOT AT ALL
6. FEELING BAD ABOUT YOURSELF - OR THAT YOU ARE A FAILURE OR HAVE LET YOURSELF OR YOUR FAMILY DOWN: SEVERAL DAYS
9. THOUGHTS THAT YOU WOULD BE BETTER OFF DEAD, OR OF HURTING YOURSELF: SEVERAL DAYS
4. FEELING TIRED OR HAVING LITTLE ENERGY: NEARLY EVERY DAY
5. POOR APPETITE OR OVEREATING: NEARLY EVERY DAY

## 2017-11-21 ASSESSMENT — PAIN SCALES - GENERAL
PAINLEVEL_OUTOF10: 0
PAINLEVEL_OUTOF10: 8
PAINLEVEL_OUTOF10: 5
PAINLEVEL_OUTOF10: 8

## 2017-11-21 ASSESSMENT — COPD QUESTIONNAIRES
DURING THE PAST 4 WEEKS HOW MUCH DID YOU FEEL SHORT OF BREATH: NONE/LITTLE OF THE TIME
COPD SCREENING SCORE: 4
DO YOU EVER COUGH UP ANY MUCUS OR PHLEGM?: YES, EVERY DAY
DO YOU EVER COUGH UP ANY MUCUS OR PHLEGM?: YES, EVERY DAY
HAVE YOU SMOKED AT LEAST 100 CIGARETTES IN YOUR ENTIRE LIFE: YES
HAVE YOU SMOKED AT LEAST 100 CIGARETTES IN YOUR ENTIRE LIFE: YES
COPD SCREENING SCORE: 4
DURING THE PAST 4 WEEKS HOW MUCH DID YOU FEEL SHORT OF BREATH: NONE/LITTLE OF THE TIME

## 2017-11-21 ASSESSMENT — LIFESTYLE VARIABLES
ALCOHOL_USE: NO
EVER_SMOKED: NEVER
DO YOU DRINK ALCOHOL: NO
EVER_SMOKED: YES
SUBSTANCE_ABUSE: 0

## 2017-11-21 NOTE — ED NOTES
"Chief Complaint   Patient presents with   • Vascular Access Problem     pt reports that her fistula is not working.    • Flank Pain     pt has ESRF, having more pain. missed dialysis yesterday.      Pt has multiple other complaints, \"lupus, N/V, fever yesterday, fatigue.\" sepsis score of 3. Charge RN notified. Pt to 24.  "

## 2017-11-21 NOTE — ED NOTES
Regarding previous noted charted under wrong person.    Port accessed, utilizing sterile technique. Bio patch placed upon insertion. Blood drawn and sent to lab, clean catch urine specimen collected and sent to lab. Tech called for blood culture mariela X 2

## 2017-11-21 NOTE — H&P
Hospital Medicine History and Physical    Date of Service  11/21/2017    Chief Complaint  Chief Complaint   Patient presents with   • Vascular Access Problem     pt reports that her fistula is not working.    • Flank Pain     pt has ESRF, having more pain. missed dialysis yesterday.        History of Presenting Illness  Patient is a 37 y.o. female who presented 11/21/2017 with complaints of bilateral flank pain.  Pain is worse on Left, rated as 8/10 and constant.  Pain began approximately 4 days ago and has been increasing.  It is non radiating.  She reports associated nausea and several episodes of non blood emesis over the past two days. She was able to tolerate liquids this am.  She has had not abdominal pain.  Her stool has been loose with a bm yesterday but no scott diarrhea.  She does reports dysuria.  She normally receives hemodialysis on Mon, Weds and Fridays but missed her last treatment.  She reports chronic bilateral hip pain from avascular necrosis is at baseline rated as 6/10, worse on the left.  She denies recent sick contacts, no chest pain, no sob, no dizziness.  She states she did not take any of her chronic medications over the past few days due to nausea.    She also mentioned that the thrill in her right fistula stopped yesterday.  Primary Care Physician  Sushila Manzano M.D.    Consultants  Sivakumar, nephrology  Duglas, vascular surgery    Code Status  Full code  Review of Systems  Review of Systems   Constitutional: Positive for chills, fever and malaise/fatigue. Negative for diaphoresis and weight loss.   HENT: Negative.  Negative for congestion, ear discharge, ear pain, hearing loss, nosebleeds, sinus pain, sore throat and tinnitus.    Eyes: Negative.  Negative for blurred vision, double vision, photophobia, pain, discharge and redness.   Respiratory: Negative.  Negative for cough, hemoptysis, sputum production, shortness of breath, wheezing and stridor.    Cardiovascular: Negative.  Negative  for chest pain, palpitations, orthopnea, claudication, leg swelling and PND.   Gastrointestinal: Positive for nausea and vomiting. Negative for abdominal pain, blood in stool, diarrhea, heartburn and melena.   Genitourinary: Positive for dysuria, flank pain and urgency. Negative for frequency and hematuria.   Musculoskeletal: Positive for joint pain and myalgias. Negative for back pain, falls and neck pain.   Skin: Negative.  Negative for itching and rash.   Neurological: Negative.  Negative for dizziness, tingling, tremors, sensory change, focal weakness, seizures, loss of consciousness and headaches.   Endo/Heme/Allergies: Negative for environmental allergies and polydipsia. Does not bruise/bleed easily.   Psychiatric/Behavioral: Negative for depression, hallucinations, memory loss, substance abuse and suicidal ideas. The patient has insomnia. The patient is not nervous/anxious.    All other systems reviewed and are negative.       Past Medical History  Past Medical History:   Diagnosis Date   • Avascular necrosis of bones of both hips (HCC) 10/10/2016   • ESBL (extended spectrum beta-lactamase) producing bacteria infection 8/25/2014   • Pneumonia    • Clostridium difficile colitis 5/3/2011   • Arthritis     all joints,r/t lupus   • Dialysis patient    • Fibromyalgia    • Hypertension    • Lupus    • Psychiatric disorder     anxiety, depression   • Renal failure        Surgical History  Past Surgical History:   Procedure Laterality Date   • THROMBECTOMY Right 8/21/2016    Procedure: THROMBECTOMY - right AV fistula graft with grams;  Surgeon: Shabbir Ardon M.D.;  Location: SURGERY [facility];  Service:    • ANGIOPLASTY BALLOON  8/21/2016    Procedure: ANGIOPLASTY BALLOON;  Surgeon: Shabbir Ardon M.D.;  Location: SURGERY [facility];  Service:    • THROMBECTOMY Right 8/20/2016    Procedure: THROMBECTOMY AV GRAFT;  Surgeon: Shabbir Ardon M.D.;  Location: SURGERY [facility];  Service:     • AV FISTULA CREATION Right 7/12/2016    Procedure: AV FISTULA CREATION WITH GRAFT BRACHIAL AXILLARY;  Surgeon: Shabbir Ardon M.D.;  Location: SURGERY [facility];  Service:    • CLOSED REDUCTION Right 7/5/2016    Procedure: CLOSED REDUCTION- Hip ;  Surgeon: Michael Holman M.D.;  Location: SURGERY [facility];  Service:    • HIP ARTHROPLASTY TOTAL Right 1/18/2016    Procedure: HIP ARTHROPLASTY TOTAL;  Surgeon: Michael Holman M.D.;  Location: SURGERY [facility];  Service:    • AV FISTULA CREATION  2/3/2015    Performed by Shabbir Ardon M.D. at SURGERY [facility]   • AV FISTULA CREATION  11/14/2014    Performed by Shabbir Ardon M.D. at [facility]   • AV FISTULA CREATION  9/9/2014    Performed by Shabbir Ardon M.D. at [facility]   • CATH PLACEMENT  9/9/2014    Performed by Shabbir Ardon M.D. at [facility]   • OTHER  5/2011    tracheostomy   • GASTROSCOPY-ENDO  4/27/2011    Performed by PALMIRA DOAN at [facility]COPY [facility]   • COLONOSCOPY WITH BIOPSY  4/20/2011    Performed by ALCIRA CRUZ at [facility]   • GASTROSCOPY-ENDO  4/18/2011    Performed by ALCIRA CRUZ at [facility]   • GASTROSCOPY-ENDO  4/10/2011    Performed by SYED JURADO at [facility]   • SCLEROTHERAPHY  4/10/2011    Performed by SYED JURADO at [facility]   • OTHER ABDOMINAL SURGERY      kidney biopsy   • TRACHEOSTOMY         Medications  No current facility-administered medications on file prior to encounter.      Current Outpatient Prescriptions on File Prior to Encounter   Medication Sig Dispense Refill   • hydrOXYzine (ATARAX) 25 MG Tab Take 0.5-1 Tabs by mouth 3 times a day as needed for Anxiety. 30 Tab 1   • ferrous sulfate 325 (65 FE) MG tablet Take 325 mg by mouth every day.     • amlodipine (NORVASC) 5 MG Tab Take 5 mg by mouth as needed (Only if BP get 160).     •  ascorbic acid (ASCORBIC ACID) 500 MG Tab Take 500 mg by mouth every day.     • buPROPion (WELLBUTRIN XL) 150 MG XL tablet Take 150 mg by mouth every morning.     • pregabalin (LYRICA) 150 MG Cap Take 150 mg by mouth 3 times a day.     • promethazine (PHENERGAN) 25 MG Tab Take 25 mg by mouth every 6 hours as needed for Nausea/Vomiting.     • hydroxychloroquine (PLAQUENIL) 200 MG Tab Take 200 mg by mouth every bedtime.     • cholecalciferol (VITAMIN D3) 5000 UNIT Cap Take 10,000 Units by mouth every day.     • trazodone (DESYREL) 50 MG Tab Take 1 Tab by mouth every bedtime. 30 Tab 0   • tizanidine (ZANAFLEX) 4 MG Tab Take 2 Tabs by mouth every bedtime. 60 Tab 0       Family History  No family history on file.    Social History  Social History   Substance Use Topics   • Smoking status: Former Smoker     Packs/day: 0.50     Years: 18.00     Types: Cigarettes     Quit date: 2011   • Smokeless tobacco: Never Used      Comment: 1/2 ppd   • Alcohol use No      Comment: occ       Allergies  Allergies   Allergen Reactions   • Ativan Anxiety     Patient becomes severely paranoid and agitated   • Morphine Itching     Tolerates Dilaudid   • Seasonal Runny Nose and Itching     Hay fever, sabiha brush        Physical Exam  Laboratory   Hemodynamics  Temp (24hrs), Av.1 °C (98.7 °F), Min:36.4 °C (97.6 °F), Max:37.6 °C (99.7 °F)   Temperature: 36.4 °C (97.6 °F)  Pulse  Av.7  Min: 82  Max: 126    Blood Pressure: 122/76, NIBP: 109/69      Respiratory      Respiration: 18, Pulse Oximetry: 99 %     Work Of Breathing / Effort: Mild  RUL Breath Sounds: Clear, RML Breath Sounds: Clear, RLL Breath Sounds: Diminished, LASHANDA Breath Sounds: Clear, LLL Breath Sounds: Diminished    Physical Exam   Constitutional: She is oriented to person, place, and time. She appears well-developed and well-nourished. No distress.   HENT:   Head: Normocephalic and atraumatic.   Mouth/Throat: Oropharynx is clear and moist. No oropharyngeal exudate.    Eyes: Conjunctivae and EOM are normal. Pupils are equal, round, and reactive to light. Right eye exhibits no discharge. Left eye exhibits no discharge. No scleral icterus.   Neck: Normal range of motion. Neck supple. No tracheal deviation present. No thyromegaly present.   Cardiovascular: Normal rate, normal heart sounds and intact distal pulses.  Exam reveals no gallop and no friction rub.    No murmur heard.  Pulmonary/Chest: Effort normal and breath sounds normal. No stridor. No respiratory distress. She has no wheezes. She has no rales. She exhibits no tenderness.   Abdominal: Soft. Bowel sounds are normal. She exhibits no distension and no mass. There is no tenderness. There is no rebound and no guarding.   Genitourinary:   Genitourinary Comments: Bilateral cva tenderness l > r   Musculoskeletal: Normal range of motion. She exhibits no edema, tenderness or deformity.   R ue fistula with no palp or audible thrill   Lymphadenopathy:     She has no cervical adenopathy.   Neurological: She is alert and oriented to person, place, and time. She has normal reflexes. No cranial nerve deficit. She exhibits normal muscle tone. Coordination normal.   Skin: Skin is warm and dry. No rash noted. She is not diaphoretic. No erythema. No pallor.   Psychiatric: She has a normal mood and affect. Her behavior is normal. Judgment and thought content normal.   Nursing note and vitals reviewed.      Recent Labs      11/21/17   0819   WBC  9.6   RBC  4.28   HEMOGLOBIN  13.7   HEMATOCRIT  40.5   MCV  94.6   MCH  32.0   MCHC  33.8   RDW  46.5   PLATELETCT  141*   MPV  10.9     Recent Labs      11/21/17   0819   SODIUM  138   POTASSIUM  4.4   CHLORIDE  94*   CO2  20   GLUCOSE  91   BUN  97*   CREATININE  10.91*   CALCIUM  9.3     Recent Labs      11/21/17   0819   ALTSGPT  17   ASTSGOT  12   ALKPHOSPHAT  85   TBILIRUBIN  0.5   GLUCOSE  91                 Lab Results   Component Value Date    TROPONINI 0.15 (H) 07/19/2017      Urinalysis:    Lab Results  Component Value Date/Time   SPECGRAVITY 1.012 11/21/2017 0819   GLUCOSEUR Negative 11/21/2017 0819   KETONES Negative 11/21/2017 0819   NITRITE Negative 11/21/2017 0819   WBCURINE Packed WBC 11/21/2017 0819   RBCURINE 2-5 (A) 11/21/2017 0819   BACTERIA Moderate (A) 11/21/2017 0819   EPITHELCELL Moderate (A) 11/21/2017 0819        Imaging  cxr within normal limits  Vascular u/s R UE pending   Assessment/Plan     I anticipate this patient will require at least two overnight for treatment and management    Pyelonephritis- (present on admission)   Assessment & Plan    Bilateral flank pain  H/o of esbl but no current s/o sepsis, will continue ceftriaxone awaiting culture results, can change abx later if clinically necessary or based on culture results  Continue support care        A-V fistula (CMS-HCC)- (present on admission)   Assessment & Plan    With clinical s/o thrombosis  Vascular surgery to access        Narcotic dependence (CMS-HCC)- (present on admission)   Assessment & Plan    Nephrology following  HD to be restarted            VTE prophylaxis: Heparin

## 2017-11-21 NOTE — PROGRESS NOTES
Performed 2 RN Skin Check - patient's skin is intact with no breakdown noted. Old, healed scar noted to sacrum; healed surgical scars to right hip (total hip replacement), anterior throat (tracheostomy), and right arm (AV fistulas).

## 2017-11-21 NOTE — ASSESSMENT & PLAN NOTE
Bilateral flank pain, nausea.-improved  continue IV rocephin- fu final cx results.   Prn anti emetics  Continue support care  Hl ivfs

## 2017-11-21 NOTE — ASSESSMENT & PLAN NOTE
w thrombosis - post thrombectomy , angioplasty 11-21-17 - good thrill  Continue HD per nephro.  Fu renal fxn, lytes.   Vascular surgery input.

## 2017-11-21 NOTE — ED PROVIDER NOTES
ED Provider Note    CHIEF COMPLAINT  Chief Complaint   Patient presents with   • Vascular Access Problem     pt reports that her fistula is not working.    • Flank Pain     pt has ESRF, having more pain. missed dialysis yesterday.        HPI  Debby Carrizales is a 37 y.o. female who presentsWith nausea, vomiting and flank pain. Patient is a history of end-stage renal disease. Has a history of lupus as well as chronic pain. She started having fever yesterday. She reports she's had cloudy urine. She has bilateral flank pain which is gradually worsening. She tried to go to dialysis yesterday, but her fistula had clotted. She did not get dialysis. Last night she had a fever of 101. She took Tylenol which seemed to help this. She has had multiple episodes of nausea and vomiting. Hard time keeping things down. Has not had diarrhea. Denies abdominal pain. Denies chest pain, shortness of breath or cough. No rash. She suffers from chronic generalized body aches which feel worse lately.    REVIEW OF SYSTEMS  As per HPI, otherwise a 10 point review of systems is negative    PAST MEDICAL HISTORY  Past Medical History:   Diagnosis Date   • Arthritis     all joints,r/t lupus   • Avascular necrosis of bones of both hips (HCC) 10/10/2016   • Clostridium difficile colitis 5/3/2011   • Dialysis patient    • ESBL (extended spectrum beta-lactamase) producing bacteria infection 8/25/2014   • Fibromyalgia    • Hypertension    • Lupus    • Pneumonia    • Psychiatric disorder     anxiety, depression   • Renal failure        SOCIAL HISTORY  Social History   Substance Use Topics   • Smoking status: Former Smoker     Packs/day: 0.50     Years: 18.00     Types: Cigarettes     Quit date: 6/13/2011   • Smokeless tobacco: Never Used      Comment: 1/2 ppd   • Alcohol use No      Comment: occ       SURGICAL HISTORY  Past Surgical History:   Procedure Laterality Date   • THROMBECTOMY Right 8/21/2016    Procedure: THROMBECTOMY - right  AV fistula graft with grams;  Surgeon: Shabbir Ardon M.D.;  Location: SURGERY [facility];  Service:    • ANGIOPLASTY BALLOON  8/21/2016    Procedure: ANGIOPLASTY BALLOON;  Surgeon: Shabbir Ardon M.D.;  Location: SURGERY [facility];  Service:    • THROMBECTOMY Right 8/20/2016    Procedure: THROMBECTOMY AV GRAFT;  Surgeon: Shabbir Ardon M.D.;  Location: SURGERY [facility];  Service:    • AV FISTULA CREATION Right 7/12/2016    Procedure: AV FISTULA CREATION WITH GRAFT BRACHIAL AXILLARY;  Surgeon: Shabbir Ardon M.D.;  Location: SURGERY [facility];  Service:    • CLOSED REDUCTION Right 7/5/2016    Procedure: CLOSED REDUCTION- Hip ;  Surgeon: Michael Holman M.D.;  Location: SURGERY [facility];  Service:    • HIP ARTHROPLASTY TOTAL Right 1/18/2016    Procedure: HIP ARTHROPLASTY TOTAL;  Surgeon: Michael Holman M.D.;  Location: [facility];  Service:    • AV FISTULA CREATION  2/3/2015    Performed by Shabbir Ardon M.D. at SURGERY [facility]   • AV FISTULA CREATION  11/14/2014    Performed by Shabbir Ardon M.D. at [facility]   • AV FISTULA CREATION  9/9/2014    Performed by Shabbir Ardon M.D. at [facility]   • CATH PLACEMENT  9/9/2014    Performed by Shabbir Ardon M.D. at [facility]   • OTHER  5/2011    tracheostomy   • GASTROSCOPY-ENDO  4/27/2011    Performed by PALMIRA DOAN at ENDOSCOPY [facility]   • COLONOSCOPY WITH BIOPSY  4/20/2011    Performed by ALCIRA CRUZ at ENDOSCOPY [facility]   • GASTROSCOPY-ENDO  4/18/2011    Performed by ALCIRA CRUZ at [facility]   • GASTROSCOPY-ENDO  4/10/2011    Performed by SYED JURADO at [facility]   • SCLEROTHERAPHY  4/10/2011    Performed by SYED JURADO at [facility]   • OTHER ABDOMINAL SURGERY      kidney biopsy   • TRACHEOSTOMY         CURRENT MEDICATIONS  Home Medications    **Home  "medications have not yet been reviewed for this encounter**         ALLERGIES  Allergies   Allergen Reactions   • Ativan Anxiety     Patient becomes severely paranoid and agitated   • Morphine Itching     Tolerates Dilaudid   • Seasonal Runny Nose and Itching     Hay fever, sabiha brush       PHYSICAL EXAM  VITAL SIGNS: /82   Pulse (!) 126   Temp 37.6 °C (99.7 °F)   Resp 18   Ht 1.651 m (5' 5\")   Wt 80.6 kg (177 lb 11.1 oz)   LMP 08/17/2017   SpO2 96%   BMI 29.57 kg/m²    Constitutional: Awake and alert, chronically ill-appearing young female  HENT:  Atraumatic, Normocephalic.Oropharynx moist mucus membranes, Nose normal inspection.   Eyes: Normal inspection  Neck: Supple  Cardiovascular: Tachycardic heart rate, Normal rhythm.  Symmetric peripheral pulses.   Thorax & Lungs: No respiratory distress, No wheezing, No rales, No rhonchi, No chest tenderness.   Abdomen: Bowel sounds normal, soft, non-distended, nontender, no mass  Skin: Warm, Dry, No rash.   Back: No tenderness, bilateral CVA tenderness  Extremities: Fistula right upper extremity without thrill. No asymmetric swelling or cellulitis  Neurologic: Grossly normal       RADIOLOGY/PROCEDURES  HEMODIALYSIS ACCESS DUPLX   Final Result      DX-CHEST-PORTABLE (1 VIEW)   Final Result      No acute cardiopulmonary findings.         Imaging is interpreted by radiologist    Labs:  Results for orders placed or performed during the hospital encounter of 11/21/17   LACTIC ACID   Result Value Ref Range    Lactic Acid 1.5 0.5 - 2.0 mmol/L   LACTIC ACID   Result Value Ref Range    Lactic Acid 1.2 0.5 - 2.0 mmol/L   CBC WITH DIFFERENTIAL   Result Value Ref Range    WBC 9.6 4.8 - 10.8 K/uL    RBC 4.28 4.20 - 5.40 M/uL    Hemoglobin 13.7 12.0 - 16.0 g/dL    Hematocrit 40.5 37.0 - 47.0 %    MCV 94.6 81.4 - 97.8 fL    MCH 32.0 27.0 - 33.0 pg    MCHC 33.8 33.6 - 35.0 g/dL    RDW 46.5 35.9 - 50.0 fL    Platelet Count 141 (L) 164 - 446 K/uL    MPV 10.9 9.0 - 12.9 fL    " Neutrophils-Polys 74.90 (H) 44.00 - 72.00 %    Lymphocytes 13.40 (L) 22.00 - 41.00 %    Monocytes 8.30 0.00 - 13.40 %    Eosinophils 2.40 0.00 - 6.90 %    Basophils 0.60 0.00 - 1.80 %    Immature Granulocytes 0.40 0.00 - 0.90 %    Nucleated RBC 0.00 /100 WBC    Neutrophils (Absolute) 7.19 (H) 2.00 - 7.15 K/uL    Lymphs (Absolute) 1.29 1.00 - 4.80 K/uL    Monos (Absolute) 0.80 0.00 - 0.85 K/uL    Eos (Absolute) 0.23 0.00 - 0.51 K/uL    Baso (Absolute) 0.06 0.00 - 0.12 K/uL    Immature Granulocytes (abs) 0.04 0.00 - 0.11 K/uL    NRBC (Absolute) 0.00 K/uL   COMP METABOLIC PANEL   Result Value Ref Range    Sodium 138 135 - 145 mmol/L    Potassium 4.4 3.6 - 5.5 mmol/L    Chloride 94 (L) 96 - 112 mmol/L    Co2 20 20 - 33 mmol/L    Anion Gap 24.0 (H) 0.0 - 11.9    Glucose 91 65 - 99 mg/dL    Bun 97 (HH) 8 - 22 mg/dL    Creatinine 10.91 (HH) 0.50 - 1.40 mg/dL    Calcium 9.3 8.5 - 10.5 mg/dL    AST(SGOT) 12 12 - 45 U/L    ALT(SGPT) 17 2 - 50 U/L    Alkaline Phosphatase 85 30 - 99 U/L    Total Bilirubin 0.5 0.1 - 1.5 mg/dL    Albumin 3.7 3.2 - 4.9 g/dL    Total Protein 6.6 6.0 - 8.2 g/dL    Globulin 2.9 1.9 - 3.5 g/dL    A-G Ratio 1.3 g/dL   URINALYSIS   Result Value Ref Range    Color Yellow     Character Cloudy (A)     Specific Gravity 1.012 <1.035    Ph 8.5 (A) 5.0 - 8.0    Glucose Negative Negative mg/dL    Ketones Negative Negative mg/dL    Protein 100 (A) Negative mg/dL    Bilirubin Negative Negative    Urobilinogen, Urine 0.2 Negative    Nitrite Negative Negative    Leukocyte Esterase Large (A) Negative    Occult Blood Trace (A) Negative    Micro Urine Req Microscopic    HCG QUAL SERUM   Result Value Ref Range    Beta-Hcg Qualitative Serum Negative Negative   URINE MICROSCOPIC (W/UA)   Result Value Ref Range    WBC Packed WBC /hpf    RBC 2-5 (A) /hpf    Bacteria Moderate (A) None /hpf    Epithelial Cells Moderate (A) /hpf    Hyaline Cast 6-10 (A) /lpf   ESTIMATED GFR   Result Value Ref Range    GFR If   5 (A) >60 mL/min/1.73 m 2    GFR If Non African American 4 (A) >60 mL/min/1.73 m 2       COURSE & MEDICAL DECISION MAKING  Patient presents with subjective fever, weakness, flank pain. She is tachycardic on arrival. IV is established. She has a history of end-stage renal disease and does not appear volume depleted. Laboratory data was collected. She has pyuria. Her electrolytes are acceptable despite missing dialysis. Lactic acid was within normal limits. She is given Rocephin for her pyuria. She requires hemodialysis in her fistula is thrombosed.    Consult Dr. Shabbir Ardon. Plan is for intervention today. He requested patient be kept nothing by mouth.    Consult Dr. Shaver, nephrology. She will evaluate the patient    Consult Dr. Luna for admission      FINAL IMPRESSION  1. Pyuria  2. Thrombosed AV fistula      This dictation was created using voice recognition software. The accuracy of the dictation is limited to the abilities of the software.  The nursing notes were reviewed and certain aspects of this information were incorporated into this note.      Electronically signed by: Irving Valenzuela, 11/21/2017 8:44 AM

## 2017-11-21 NOTE — PROGRESS NOTES
"Received patient to room S 190-2 at this time via [employer]. Pt is stable, oriented, and ambulatory, reports pain 8/10 in bilateral \"kidneys\" and in left hip. This nurse noted that there are no medication orders at this time, will call physician for prn pain med orders.  "

## 2017-11-21 NOTE — ED NOTES
prt accessed utilizing sterile technique. Bio patch placed with insertion. BLood drawn, labeled and sent to lab, clean catch urine specimen collected and sent to lab. Tech called for blood culture draw X 2

## 2017-11-21 NOTE — DISCHARGE PLANNING
Outpatient Dialysis Note    Confirmed patient is active at:    [facility]   [address] [place] [address]   [place] [address]    Schedule: Monday, Wednesday, friday  Time: 3:00 pm    Spoke with Lashawn at facility who confirmed.    Forwarded records for review.    Dialysis Coordinator, [facility]  Kelli Anderson [phone]

## 2017-11-21 NOTE — PROGRESS NOTES
"This nurse spoke with IR re: strict NPO or can pt have sips with meds. Saad in IR stated \"sips with meds should be okay.\"     This nurse called Dr Gutierrez and relayed conversation with IR re: sips with meds. MD gives verbal authorization to modify NPO order to include sips with meds. This nurse puts in order.    MD clarifies that Renal Diet order is for post IR procedure.  "

## 2017-11-22 LAB
ALBUMIN SERPL BCP-MCNC: 3.5 G/DL (ref 3.2–4.9)
ALBUMIN/GLOB SERPL: 1.2 G/DL
ALP SERPL-CCNC: 67 U/L (ref 30–99)
ALT SERPL-CCNC: 16 U/L (ref 2–50)
ANION GAP SERPL CALC-SCNC: 15 MMOL/L (ref 0–11.9)
AST SERPL-CCNC: 59 U/L (ref 12–45)
BILIRUB SERPL-MCNC: 1.1 MG/DL (ref 0.1–1.5)
BUN SERPL-MCNC: 95 MG/DL (ref 8–22)
CALCIUM SERPL-MCNC: 9.1 MG/DL (ref 8.5–10.5)
CHLORIDE SERPL-SCNC: 99 MMOL/L (ref 96–112)
CO2 SERPL-SCNC: 21 MMOL/L (ref 20–33)
CREAT SERPL-MCNC: 11.21 MG/DL (ref 0.5–1.4)
EKG IMPRESSION: NORMAL
GFR SERPL CREATININE-BSD FRML MDRD: 4 ML/MIN/1.73 M 2
GLOBULIN SER CALC-MCNC: 2.9 G/DL (ref 1.9–3.5)
GLUCOSE SERPL-MCNC: 82 MG/DL (ref 65–99)
HBV SURFACE AB SERPL IA-ACNC: 7.79 MIU/ML (ref 0–10)
HBV SURFACE AG SER QL: NEGATIVE
POTASSIUM SERPL-SCNC: 4.6 MMOL/L (ref 3.6–5.5)
PROT SERPL-MCNC: 6.4 G/DL (ref 6–8.2)
SODIUM SERPL-SCNC: 135 MMOL/L (ref 135–145)
TROPONIN I SERPL-MCNC: <0.01 NG/ML (ref 0–0.04)

## 2017-11-22 PROCEDURE — A9270 NON-COVERED ITEM OR SERVICE: HCPCS | Performed by: HOSPITALIST

## 2017-11-22 PROCEDURE — 99232 SBSQ HOSP IP/OBS MODERATE 35: CPT | Performed by: HOSPITALIST

## 2017-11-22 PROCEDURE — 700101 HCHG RX REV CODE 250: Performed by: HOSPITALIST

## 2017-11-22 PROCEDURE — 5A1D70Z PERFORMANCE OF URINARY FILTRATION, INTERMITTENT, LESS THAN 6 HOURS PER DAY: ICD-10-PCS | Performed by: INTERNAL MEDICINE

## 2017-11-22 PROCEDURE — 700102 HCHG RX REV CODE 250 W/ 637 OVERRIDE(OP): Performed by: HOSPITALIST

## 2017-11-22 PROCEDURE — 700111 HCHG RX REV CODE 636 W/ 250 OVERRIDE (IP)

## 2017-11-22 PROCEDURE — 86706 HEP B SURFACE ANTIBODY: CPT

## 2017-11-22 PROCEDURE — 87340 HEPATITIS B SURFACE AG IA: CPT

## 2017-11-22 PROCEDURE — 90935 HEMODIALYSIS ONE EVALUATION: CPT

## 2017-11-22 PROCEDURE — 700111 HCHG RX REV CODE 636 W/ 250 OVERRIDE (IP): Performed by: HOSPITALIST

## 2017-11-22 PROCEDURE — 770006 HCHG ROOM/CARE - MED/SURG/GYN SEMI*

## 2017-11-22 PROCEDURE — 80053 COMPREHEN METABOLIC PANEL: CPT

## 2017-11-22 RX ORDER — HEPARIN SODIUM 1000 [USP'U]/ML
INJECTION, SOLUTION INTRAVENOUS; SUBCUTANEOUS
Status: COMPLETED
Start: 2017-11-22 | End: 2017-11-22

## 2017-11-22 RX ORDER — HEPARIN SODIUM 1000 [USP'U]/ML
1500 INJECTION, SOLUTION INTRAVENOUS; SUBCUTANEOUS
Status: DISCONTINUED | OUTPATIENT
Start: 2017-11-22 | End: 2017-11-24 | Stop reason: HOSPADM

## 2017-11-22 RX ORDER — LIDOCAINE 50 MG/G
1 PATCH TOPICAL PRN
Status: DISCONTINUED | OUTPATIENT
Start: 2017-11-22 | End: 2017-11-24 | Stop reason: HOSPADM

## 2017-11-22 RX ORDER — TRAZODONE HYDROCHLORIDE 100 MG/1
50 TABLET ORAL
Status: DISCONTINUED | OUTPATIENT
Start: 2017-11-22 | End: 2017-11-24 | Stop reason: HOSPADM

## 2017-11-22 RX ADMIN — ACETAMINOPHEN 650 MG: 325 TABLET, FILM COATED ORAL at 19:59

## 2017-11-22 RX ADMIN — HYDROMORPHONE HYDROCHLORIDE 0.5 MG: 2 INJECTION INTRAMUSCULAR; INTRAVENOUS; SUBCUTANEOUS at 07:49

## 2017-11-22 RX ADMIN — HYDROMORPHONE HYDROCHLORIDE 0.5 MG: 2 INJECTION INTRAMUSCULAR; INTRAVENOUS; SUBCUTANEOUS at 14:21

## 2017-11-22 RX ADMIN — HEPARIN SODIUM 1500 UNITS: 1000 INJECTION, SOLUTION INTRAVENOUS; SUBCUTANEOUS at 13:25

## 2017-11-22 RX ADMIN — CEFTRIAXONE 2 G: 2 INJECTION, POWDER, FOR SOLUTION INTRAMUSCULAR; INTRAVENOUS at 08:02

## 2017-11-22 RX ADMIN — OXYCODONE HYDROCHLORIDE 20 MG: 10 TABLET ORAL at 06:37

## 2017-11-22 RX ADMIN — LIDOCAINE 1 PATCH: 50 PATCH TOPICAL at 20:00

## 2017-11-22 RX ADMIN — Medication 325 MG: at 08:02

## 2017-11-22 RX ADMIN — HYDROMORPHONE HYDROCHLORIDE 0.5 MG: 2 INJECTION INTRAMUSCULAR; INTRAVENOUS; SUBCUTANEOUS at 16:48

## 2017-11-22 RX ADMIN — PREGABALIN 150 MG: 75 CAPSULE ORAL at 08:02

## 2017-11-22 RX ADMIN — OXYCODONE HYDROCHLORIDE 20 MG: 10 TABLET ORAL at 12:09

## 2017-11-22 RX ADMIN — PREGABALIN 150 MG: 75 CAPSULE ORAL at 14:23

## 2017-11-22 RX ADMIN — HYDROMORPHONE HYDROCHLORIDE 0.5 MG: 2 INJECTION INTRAMUSCULAR; INTRAVENOUS; SUBCUTANEOUS at 10:58

## 2017-11-22 RX ADMIN — HEPARIN SODIUM 5000 UNITS: 5000 INJECTION, SOLUTION INTRAVENOUS; SUBCUTANEOUS at 06:13

## 2017-11-22 RX ADMIN — OXYCODONE HYDROCHLORIDE AND ACETAMINOPHEN 500 MG: 500 TABLET ORAL at 08:01

## 2017-11-22 RX ADMIN — HEPARIN SODIUM 5000 UNITS: 5000 INJECTION, SOLUTION INTRAVENOUS; SUBCUTANEOUS at 14:23

## 2017-11-22 RX ADMIN — OXYCODONE HYDROCHLORIDE 20 MG: 10 TABLET ORAL at 18:04

## 2017-11-22 RX ADMIN — BUPROPION HYDROCHLORIDE 150 MG: 150 TABLET, EXTENDED RELEASE ORAL at 08:01

## 2017-11-22 RX ADMIN — PREGABALIN 150 MG: 75 CAPSULE ORAL at 20:01

## 2017-11-22 RX ADMIN — HEPARIN SODIUM 5000 UNITS: 5000 INJECTION, SOLUTION INTRAVENOUS; SUBCUTANEOUS at 20:08

## 2017-11-22 RX ADMIN — HYDROMORPHONE HYDROCHLORIDE 0.5 MG: 2 INJECTION INTRAMUSCULAR; INTRAVENOUS; SUBCUTANEOUS at 23:28

## 2017-11-22 RX ADMIN — TIZANIDINE 8 MG: 4 TABLET ORAL at 20:08

## 2017-11-22 RX ADMIN — HYDROXYCHLOROQUINE SULFATE 200 MG: 200 TABLET, FILM COATED ORAL at 20:13

## 2017-11-22 RX ADMIN — HYDROMORPHONE HYDROCHLORIDE 0.5 MG: 2 INJECTION INTRAMUSCULAR; INTRAVENOUS; SUBCUTANEOUS at 03:50

## 2017-11-22 RX ADMIN — PROMETHAZINE HYDROCHLORIDE 25 MG: 25 TABLET ORAL at 16:52

## 2017-11-22 RX ADMIN — HYDROMORPHONE HYDROCHLORIDE 0.5 MG: 2 INJECTION INTRAMUSCULAR; INTRAVENOUS; SUBCUTANEOUS at 20:03

## 2017-11-22 RX ADMIN — POTASSIUM CHLORIDE AND SODIUM CHLORIDE: 900; 150 INJECTION, SOLUTION INTRAVENOUS at 12:00

## 2017-11-22 RX ADMIN — VITAMIN D, TAB 1000IU (100/BT) 10000 UNITS: 25 TAB at 08:01

## 2017-11-22 RX ADMIN — TRAZODONE HYDROCHLORIDE 50 MG: 100 TABLET ORAL at 20:08

## 2017-11-22 ASSESSMENT — ENCOUNTER SYMPTOMS
VOMITING: 1
ABDOMINAL PAIN: 0
NECK PAIN: 0
HALLUCINATIONS: 0
DIAPHORESIS: 0
FLANK PAIN: 1
DIARRHEA: 0
CHILLS: 0
COUGH: 0
EYE REDNESS: 0
STRIDOR: 0
EYE DISCHARGE: 0
BACK PAIN: 1
SENSORY CHANGE: 0
FEVER: 0
NAUSEA: 1
FOCAL WEAKNESS: 0
MYALGIAS: 1
WHEEZING: 0
SPEECH CHANGE: 0
SHORTNESS OF BREATH: 0

## 2017-11-22 ASSESSMENT — PAIN SCALES - GENERAL
PAINLEVEL_OUTOF10: 7
PAINLEVEL_OUTOF10: 7
PAINLEVEL_OUTOF10: ASSUMED PAIN PRESENT
PAINLEVEL_OUTOF10: 7
PAINLEVEL_OUTOF10: 8
PAINLEVEL_OUTOF10: 7

## 2017-11-22 ASSESSMENT — LIFESTYLE VARIABLES: SUBSTANCE_ABUSE: 0

## 2017-11-22 NOTE — CARE PLAN
Problem: Pain Management  Goal: Pain level will decrease to patient's comfort goal    Intervention: Follow pain managment plan developed in collaboration with patient and Interdisciplinary Team  Pt with Prn dilaudid and oxycodone.

## 2017-11-22 NOTE — PROGRESS NOTES
IR RN note:    Right arm AV Fistulogram with possible interventions by MD Ardon assisted by RT Dipak,Left arm AVF access site;  Angiojet right arm AV Fistula   Angioplasty right arm fistula  End Tidal CO2 range 20-38 during procedure    Patient tolerated procedure, hemodynamically stable; report given to JOSETTE Rahman;   patient transported back to room via transport

## 2017-11-22 NOTE — OP REPORT
DATE OF SERVICE:  11/21/2017    PREOPERATIVE DIAGNOSIS:  Stage V kidney disease with thrombosed right   brachiobasilic AV graft.    POSTOPERATIVE DIAGNOSIS:  Stage V kidney disease with thrombosed right   brachiobasilic AV graft.    OPERATIONS:  1.  Two sheath placements under ultrasound guidance, one directed antegrade   and one retrograde.  2.  Pulse-spray TPA lytic therapy, right arm AV graft.  3.  Mechanical AngioJet thrombectomy, right arm AV graft.  4.  Alex catheter thrombectomy, arterial anastomosis, AV graft.  4.  Balloon angioplasty, venous outflow, 8x40 mm [place].  Balloon angioplasty   AV graft 7x45 [place].  Balloon angioplasty, arterial anastomosis, 5x40   [place] balloon.    SURGEON:  Shabbir Ardon MD    ANESTHESIA:  Moderate conscious sedation and local of 1% Xylocaine.    DESCRIPTION OF PROCEDURE:  The patient was brought to the angiography room.    She was positioned supine on the table with her right arm outstretched.  A   time-out was performed.  She had given informed consent.  She was given   moderate conscious sedation and remained stable through the procedure.  There   was no thrill, no pulse in the fistula.  I looked at it with ultrasound and   clot was present and much of it looked fresh.  I placed a micropuncture   needle, wire, and dilator in the proximal arm directed toward the wrist.  I   put a second one at the distal biceps region, directed toward the axilla.    Through the medially located 6-Beng[place] sheath that I placed, I ran a Glidewire   and then the AngioJet did pulse spray of approximately 40 mL of solution.  We   then placed the second 5 cm long sheath directed toward the axilla.  Through   this, we passed the AngioJet, but it has some mechanical difficulty and not   all of the TPA was administered.  I took a #3 Alxe and passed it through   the brachial graft anastomosis approximately 4 or 5 times to open up this area   the best I could.  I did some imaging with  contrast showing a tight outflow   stenosis at the axilla.  The AngioJet was run through the graft from both   ports several times to remove clot.  It dilated the 7 mm balloon throughout   the graft, the 5 mm balloon at the arterial anastomosis.  We did a central   venogram showing rapid flow with no stenosis of the axillary, subclavian, or   innominate veins.  A thrill developed.  We removed the sheaths and sutured the   sheath sites with 5-0 Prolene.  Blood loss was 50 mL.  Counts were reported   to be correct.  The patient tolerated the procedure well and was taken back to   the shen for observation.       ____________________________________     MD RUBEN Jackson / ISSAC    DD:  11/21/2017 19:17:07  DT:  11/21/2017 20:50:42    D#:  [id number]  Job#:  [id number]

## 2017-11-22 NOTE — CARE PLAN
Problem: Pain Management  Goal: Pain level will decrease to patient's comfort goal  Outcome: PROGRESSING SLOWER THAN EXPECTED

## 2017-11-22 NOTE — RESPIRATORY CARE
Respiratory Rapid Response Note    Symptoms chest pain     Breath Sounds  RUL Breath Sounds: Clear (11/21/17 1510)  RML Breath Sounds: Clear (11/21/17 1510)  RLL Breath Sounds: Diminished (11/21/17 1510)  LASHANDA Breath Sounds: Clear (11/21/17 1510)  LLL Breath Sounds: Diminished (11/21/17 1510)  Cough: Non Productive (11/21/17 1510)  Sputum Amount: Unable to Evaluate (11/21/17 1510)  Sputum Color: Unable to Evaluate (11/21/17 1510)  Sputum Consistency: Unable to Evaluate (11/21/17 1510)       O2 (LPM): 3 (11/21/17 2000)  O2 Daily Delivery Respiratory : Room Air with O2 Available (11/21/17 1510)       Transferred to ICU no

## 2017-11-22 NOTE — PROGRESS NOTES
Pt rested well following earlier episode of painful breathing and rapid response called. VS normal and with pt sats at 95% on 3 liters. Stand by assist up to restroom. Bed lowered locked, call light within reach. Educated pt to not pull or push or lift with right hand/arm for 24 hours. Will continue to monitor.

## 2017-11-22 NOTE — PROGRESS NOTES
Patient leaves by bed with transport staff to go to IR 1 for procedure. Pt is stable and oriented.

## 2017-11-22 NOTE — PROGRESS NOTES
Shadia Manrique Fall Risk Assessment:     Last Known Fall: Within the last month  Mobility: Dizziness/generalized weakness  Medications: Cardiovascular or central nervous system meds  Mental Status/LOC/Awareness: Awake, alert, and oriented to date, place, and person  Toileting Needs: No needs  Volume/Electrolyte Status: Nausea/vomiting  Communication/Sensory: No deficits  Behavior: Appropriate behavior  Shdaia Manrique Fall Risk Total: 9  Fall Risk Level: LOW RISK    Universal Fall Precautions:  call light/belongings in reach, bed in low position and locked, siderails up x 2, use non-slip footwear, adequate lighting, clutter free and spill free environment, educate on level of risk, educate to call for assistance    Fall Risk Level Interventions:   TRIAL (TELE 8, NEURO, MED GABBIE 5) Low Fall Risk Interventions  Place yellow fall risk ID band on patient: completed  Provide patient/family education based on risk assessment: completed  Educate patient/family to call staff for assistance when getting out of bed: completed  Place fall precaution signage outside patient door: completed      Patient Specific Interventions:     Medication: review medications with patient and family  Mental Status/LOC/Awareness: utilize bed/chair fall alarm  Toileting: instruct patient/family on the need to call for assistance when toileting  Volume/Electrolyte Status: ensure patient remains hydrated and administer medications as ordered for nausea and vomiting  Communication/Sensory: ensure proper positioning when transferrng/ambulating  Behavioral: instruct/reinforce fall program rationale  Mobility: ensure bed is locked and in lowest position

## 2017-11-22 NOTE — CARE PLAN
Problem: Communication  Goal: The ability to communicate needs accurately and effectively will improve    Intervention: Educate patient and significant other/support system about the plan of care, procedures, treatments, medications and allow for questions  Dialysis today @ 1200.

## 2017-11-22 NOTE — PROGRESS NOTES
"Entered pt. Room at 22:20 and pt was gasping for air and stated \"its hurts above my  diaphragm and I can't breath\". Helped pt sit up in bed. Pt's O2 sats were 83% on 3L via nc. Encouraged pt to take deep breaths, pt stated she was unable to. Amirah (TERRIE) entered room. Instructed CNA to get vital sign machine and ask charge nurse Jayne to come to room where Jayne called a rapid response. RR team arrived and ordered labs, chest x-ray and EKG. Chest x-ray was normal as was EKG. Still waiting labs. Pt resting at this time with no complains of sob or pain.   "

## 2017-11-22 NOTE — CONSULTS
DATE OF SERVICE:  11/21/2017    REQUESTING PHYSICIAN:  Emergency room.    REASON FOR CONSULTATION:  Thrombosed right arm AV graft.    ADDITIONAL PROBLEMS:  Bilateral flank pain, possible urinary tract infection   symptoms.  My last treatments of her included on 07/12/2016 a right brachial   axillary 4 mm to 7 mm tapered stretch Rumsey-Mulugeta AV graft.  On 08/21/2016, I had   to perform an open thrombectomy and revision of the right AV graft.  She was   anticoagulated postop, but has not continued that.  Presently, she is   complaining of pain in her arms and legs.  She has avascular necrosis of her   left hip.  She had that on the right and that hip on the right has been   replaced.  She has a PowerPort in the left parasternal region for IV access.    She has a chronic pain medication dependency problem.  Her creatinine   presently is 10.91 and apparently she skipped her last dialysis.  Her   potassium is 4.4.  She believes her right arm AV graft failed yesterday and   that she could palpate it the day before.  I cannot confirm this.    PAST MEDICAL HISTORY AND MEDICAL ILLNESSES:  Stage V kidney disease,   hypertension, and chronic pain.    PREVIOUS OPERATIONS AND HOSPITALIZATIONS:  Include E. coli urinary tract   infection, lupus.  She has had kidney biopsy in 2006.  Tracheostomy 2011.    Right arm AV fistula creation and PowerPort placement September 2014.  Right   internal jugular dialysis catheter 07/06/2016.  Right AV graft 4-7 stretch   Rumsey-Mulugeta 07/12/2016.  Next, thrombectomy of the graft 08/21/2016.    SOCIAL HISTORY:  He stopped smoking in 2001.  Denies alcohol.  She is single.    ALLERGIES:  FENTANYL CITRATE SOLUTION GIVES HER MIGRAINES.  ATIVAN GIVES HER   PARANOID AND AGITATION RESPONSE.  MORPHINE CAUSES ITCHING.    FAMILY HISTORY:  Father had prostate cancer.  Mother had heart disease.    REVIEW OF SYSTEMS:  She is bothered by the hip necrosis, but she does have   some ability to walk about.    MEDICATIONS:   Currently, her medications Include:  1.  Norvasc 5 mg daily p.r.n.  2.  Vitamin C 500 mg daily.  3.  Wellbutrin- mg each morning  4.  Rocephin 2 g IV q. 24 hours.  5.  Ferrous sulfate 325 mg daily.  6. SubQ heparin 5000 units q. 8 hours.  7.  Dilaudid 0.5 mg p.r.n. every 2 hours severe pain.  8.  Plaquenil 200 mg at bedtime.  9.  Atarax 12.5-25 mg t.i.d. as needed.  10.  Trandate 10 mg every 4 hours p.r.n. hypertension.  11.  Lyrica 150 mg t.i.d.  12.  Oxycodone immediate release 20 mg every 6 hours p.r.n. severe pain.  13.  Zanaflex 8 mg at bedtime.  14.  Vitamin D 10,000 units daily.    PHYSICAL EXAMINATION:  VITAL SIGNS:  Weight 76.8 kilograms, height 5 feet 5 inches.  GENERAL:  She is alert, well-nourished woman in no distress currently.  HEENT:  Head is normocephalic.  Eyes, sclerae and conjunctivae clear.  Pupils   are round and reactive.  Mouth, mucous membranes are pink and moist.  NECK:  No jugular venous distention.  CHEST:  PowerPort located in left anterior chest wall, parasternal.  LUNGS:  Clear breath sounds bilaterally.  No rales or rhonchi.  HEART:  Regular rhythm.  No murmur or gallop.  No precordial heave.  EXTREMITIES:  Normal radial pulses, normal pedal pulses.  No edema of the   lower extremities.  He has a right arm AV graft that is thrombosed with no   pulsatility or bruit.  It stretches from the brachial artery at the   antecubital region to the axilla.  There is no upper extremity swelling.  ABDOMEN:  Soft, nontender, no masses.  No rebound.  Liver not enlarged.  Aorta   not palpable.  NEUROLOGIC:  Grossly intact.    ASSESSMENT:  1.  Thrombosed right arm 4-7 mm tapered AV graft.  2.  Stage V kidney disease.  3.  Chronic pain syndrome.  4.  Avascular necrosis.  5.  Hypertension.    DISCUSSION:  I am hopeful that the graft is only recently thrombosed and it   would make it easier to clear the clots and perform angioplasty.  I cannot be   certain in the history from the patient.  If I  cannot reopen the graft, she   will have to have a catheter if I can find a patent vein and I told her it   might be the groins, it might be the jugular vein, I am not sure, but we will   assess that at the time.  She accepts proceeding with the right arm AV graft   thrombectomy and angioplasty procedure with thrombolysis.       ____________________________________     MD RUBEN Jackson / ISSAC    DD:  11/21/2017 16:17:38  DT:  11/21/2017 19:04:52    D#:  [id number]  Job#:  [id number]

## 2017-11-22 NOTE — CONSULTS
DATE OF SERVICE:  11/21/2017    DATE OF CONSULTATION:  11/21/2017.    REQUESTING PHYSICIAN:  Irving Valenzuela MD    REASON FOR CONSULTATION:  To evaluate and provide dialysis for the patient   with end-stage renal disease.    HISTORY OF PRESENT ILLNESS:  The patient is a 37-year-old female with history   of systemic lupus erythematosus, end-stage renal disease, on hemodialysis,   presented to the emergency room with complaints of nonworking vascular access,   not able to complete dialysis yesterday as well as nausea, vomiting,   abdominal discomfort, flank pain and cloudy urine.  Upon evaluation, found   fistula with no thrill or bruit.  At that point vascular surgeon, Dr. Shabbir Ardon was consulted  with plan to schedule patient for fistula revision.    Currently, patient is still complaining of abdominal discomfort and flank   pain.  Also mentioned fever last night of 101.  Denies chest pain or shortness   of breath.  No headache.  No dizziness.  No chills at present time.  No   edema.    REVIEW OF SYSTEMS:  All 12 points reviewed, all negative except for history of   present illness per CMS/AMA criteria.    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    PAST SURGICAL HISTORY:  Hip surgery, AV fistula creation.    SOCIAL HISTORY:  No tobacco, alcohol or drug use.    FAMILY HISTORY:  No history of kidney disease.    ALLERGIES:  ALLERGIC TO ATIVAN, MORPHINE, AND DILAUDID.    OUTPATIENT MEDICATIONS:  Reviewed in the chart.    PHYSICAL EXAMINATION:  VITAL SIGNS:  Blood pressure 130/91, heart rate 81, temperature 36.6.  GENERAL APPEARANCE:  Well-developed, well-nourished female, in no acute   distress.  HEENT:  Normocephalic, atraumatic.  Pupils are equal, round, and reactive to   light.  Extraocular movements are intact.  Nares  patent.  Oropharynx is clear,   moist mucosa, no erythema or exudate.  NECK:  Supple, no lymphadenopathy or thyromegaly appreciated.  LUNGS:  Clear to auscultation bilaterally.  No rales, wheezes, or rhonchi.  HEART:  Regular rate.  No rub or gallop.  ABDOMEN:  Soft and mild tenderness to palpation.  Bowel sounds present.  No   palpable masses.  EXTREMITIES:  No cyanosis, clubbing, or edema.  NEUROLOGIC:  Alert and oriented x3.  No focal deficits.    LABORATORY RESULTS:  Reviewed, revealed hemoglobin level at 13.7.  Sodium is   138, potassium 4.4, CO2 20, BUN 97, creatinine 10.9.    ASSESSMENT AND PLAN:  Patient is a 37-year-old female with end-stage renal   disease, on hemodialysis, admitted with clotted vascular access as well as   urinary tract infection.    PROBLEMS:  1.  End-stage renal disease.  We will continue dialysis as soon as the   vascular access available.  2.  Clotted AV fistula, scheduled for revision.  3.  Hypertension.  Blood pressure remains well controlled.  4.  Electrolytes, remains well controlled.  5.  Volume.  The patient is not significantly overloaded.    RECOMMENDATIONS:  1.  Diet:  Renal.  2.  Hemodialysis after vascular access retention.  3.  All medications adjusted to renal doses.  4.  We will follow the patient closely.    Thank you for the consult.       ____________________________________     MD GHAZAL COX / ISSAC    DD:  11/21/2017 18:51:27  DT:  11/21/2017 23:06:09    D#:  [id number]  Job#:  [id number]

## 2017-11-22 NOTE — DIETARY
Nutrition Services:  Poor PO    Poor PO on Nutrition Admit Screen. Pt is currently on a Renal, Diabetic diet and no PO recorded in chart. Breakfast tray at bedside was % consumed.  Ht: 165.1 cm, Wt: 76.8 kg, BMI 28.18.  Spoke with pt at bedside.  Pt reports a low appetite PTA, but that her appetite has returned.  Pt states no recent wt loss, and that her wt fluctuates d/t dialysis.  Pt reports no problems chewing, but does report difficulty swallowing at times d/t heartburn.  Obtained meal preferences for pt related to heartburn and added to daily menu.      Consult RD as needed. RD will re-screen weekly.      RD available prn

## 2017-11-22 NOTE — PROGRESS NOTES
[facility] Hospitalist Progress Note    Date of Service: 2017    Chief Complaint  37 y.o. Female hx of ESRD on HD, HTN ,  admitted 2017 with bilateral flank pain, non functioning AVF.     Interval Problem Update    Dx clotted AVF- S/p right AV graft thrombectomy w balloon angioplasty.  Findings of UTI, pyelonephritis- afebrile  on IV Atbs. Plan HD today.    Consultants/Specialty    Dr. Shaver- nephrology  Dr. Ardon- surgery     Disposition  Anticipate dc home when stable.         Review of Systems   Constitutional: Negative for chills, diaphoresis and fever.   HENT: Negative for congestion.    Eyes: Negative for discharge and redness.   Respiratory: Negative for cough, shortness of breath, wheezing and stridor.    Cardiovascular: Negative for chest pain and leg swelling.   Gastrointestinal: Positive for nausea and vomiting. Negative for abdominal pain and diarrhea.   Genitourinary: Positive for dysuria, flank pain, frequency and urgency. Negative for hematuria.   Musculoskeletal: Positive for back pain (chronic lower back) and myalgias. Negative for joint pain and neck pain.   Neurological: Negative for sensory change, speech change and focal weakness.   Psychiatric/Behavioral: Negative for hallucinations and substance abuse.      Physical Exam  Laboratory/Imaging   Hemodynamics  Temp (24hrs), Av.9 °C (98.5 °F), Min:36.4 °C (97.6 °F), Max:37.5 °C (99.5 °F)   Temperature: 37 °C (98.6 °F)  Pulse  Av.2  Min: 65  Max: 126 Heart Rate (Monitored): 75  Blood Pressure: 122/88, NIBP: 130/89      Respiratory      Respiration: 18, Pulse Oximetry: 95 %, O2 Daily Delivery Respiratory : Room Air with O2 Available     Work Of Breathing / Effort: Mild  RUL Breath Sounds: Clear, RML Breath Sounds: Clear, RLL Breath Sounds: Diminished, LASHANDA Breath Sounds: Clear, LLL Breath Sounds: Diminished    Fluids  No intake or output data in the 24 hours ending 17 0839    Nutrition  Orders Placed This Encounter   Procedures    • Diet Order     Standing Status:   Standing     Number of Occurrences:   1     Order Specific Question:   Diet:     Answer:   Renal [8]     Order Specific Question:   Diet:     Answer:   Diabetic [3]     Physical Exam   Constitutional: She is oriented to person, place, and time. No distress.   HENT:   Head: Normocephalic and atraumatic.   Right Ear: External ear normal.   Left Ear: External ear normal.   Nose: Nose normal.   Eyes: EOM are normal. Right eye exhibits no discharge. Left eye exhibits no discharge. No scleral icterus.   Neck: Neck supple. No JVD present.   Cardiovascular: Normal rate and regular rhythm.    No murmur heard.  Pulmonary/Chest: Effort normal. No stridor. She has no wheezes. She has no rales.   Abdominal: Soft. Bowel sounds are normal. She exhibits no distension. There is no tenderness.   + bilateral CVAT  Obese.    Musculoskeletal: She exhibits tenderness. She exhibits no edema.   Rt arm AVF dressed w weak thrill    Neurological: She is alert and oriented to person, place, and time. No cranial nerve deficit.   Skin: Skin is warm and dry. She is not diaphoretic. No pallor.   Psychiatric: She has a normal mood and affect. Her behavior is normal.   Vitals reviewed.      Recent Labs      11/21/17   0819  11/21/17   2300   WBC  9.6  6.5   RBC  4.28  3.60*   HEMOGLOBIN  13.7  11.9*   HEMATOCRIT  40.5  34.9*   MCV  94.6  96.9   MCH  32.0  33.1*   MCHC  33.8  34.1   RDW  46.5  46.8   PLATELETCT  141*  117*   MPV  10.9  10.8     Recent Labs      11/21/17   0819  11/22/17   0341   SODIUM  138  135   POTASSIUM  4.4  4.6   CHLORIDE  94*  99   CO2  20  21   GLUCOSE  91  82   BUN  97*  95*   CREATININE  10.91*  11.21*   CALCIUM  9.3  9.1                      Assessment/Plan     * A-V fistula (CMS-HCC)- (present on admission)   Assessment & Plan    w thrombosis - post thrombectomy , angioplasty 11-21-17   HD per nephrology today.   Fu renal fxn, lytes.   Vascular surgery input.          Pyelonephritis-  (present on admission)   Assessment & Plan    Bilateral flank pain, nausea.   IV rocephin-fu cx results  Prn anti emetics  Continue support care        Nausea & vomiting   Assessment & Plan    Due to Pyelonephritis , metabolic cause- abd exam benign.  HD per nephro  IV atbs  Prn anti emetics.         ESRD (end stage renal disease) on dialysis (CMS-HCC)- (present on admission)   Assessment & Plan    Thrombosed AVF repaired   HD per nephro        Chronic lupus nephritis (CMS-HCC)- (present on admission)   Assessment & Plan    No flare up  Continue Plaquenil        Avascular necrosis (CMS-HCC)   Assessment & Plan    Chronic , bilateral hip- stable   Zanaflex  Prn oxycodone.           Depression- (present on admission)   Assessment & Plan    With insomnia   Continue wellbutrin   Resume Trazodone.        Low back pain- (present on admission)   Assessment & Plan    Chronic   lidoderm patch as needed.         Narcotic dependence (CMS-HCC)- (present on admission)   Assessment & Plan    Nephrology following  HD to be restarted            Reviewed items::  Medications reviewed, Labs reviewed and Radiology images reviewed  Taylor catheter::  No Taylor  DVT prophylaxis pharmacological::  Heparin  Antibiotics:  Treating active infection/contamination beyond 24 hours perioperative coverage

## 2017-11-22 NOTE — ASSESSMENT & PLAN NOTE
Due to Pyelonephritis , metabolic cause.  Having odynophagia, reflux symptoms   Check barium esophogram  HD per nephro  IV atbs  Prn anti emetics.   Check barium esophogram to eval for stricture/ regurge cause.

## 2017-11-22 NOTE — CARE PLAN
Problem: Safety  Goal: Will remain free from falls  Outcome: PROGRESSING AS EXPECTED  Pt uses call light for assistance to restroom. Bed lowered locked and call light within reach.

## 2017-11-23 ENCOUNTER — APPOINTMENT (OUTPATIENT)
Dept: RADIOLOGY | Facility: MEDICAL CENTER | Age: 37
DRG: 252 | End: 2017-11-23
Attending: HOSPITALIST
Payer: MEDICARE

## 2017-11-23 LAB
BACTERIA UR CULT: NORMAL
C3 SERPL-MCNC: 104 MG/DL (ref 87–200)
C4 SERPL-MCNC: 34 MG/DL (ref 19–52)
SIGNIFICANT IND 70042: NORMAL
SITE SITE: NORMAL
SOURCE SOURCE: NORMAL

## 2017-11-23 PROCEDURE — 700102 HCHG RX REV CODE 250 W/ 637 OVERRIDE(OP): Performed by: HOSPITALIST

## 2017-11-23 PROCEDURE — 770006 HCHG ROOM/CARE - MED/SURG/GYN SEMI*

## 2017-11-23 PROCEDURE — A9270 NON-COVERED ITEM OR SERVICE: HCPCS | Performed by: HOSPITALIST

## 2017-11-23 PROCEDURE — 86038 ANTINUCLEAR ANTIBODIES: CPT

## 2017-11-23 PROCEDURE — 86225 DNA ANTIBODY NATIVE: CPT

## 2017-11-23 PROCEDURE — 86235 NUCLEAR ANTIGEN ANTIBODY: CPT | Mod: 91

## 2017-11-23 PROCEDURE — 700111 HCHG RX REV CODE 636 W/ 250 OVERRIDE (IP): Performed by: HOSPITALIST

## 2017-11-23 PROCEDURE — 86160 COMPLEMENT ANTIGEN: CPT

## 2017-11-23 PROCEDURE — 99232 SBSQ HOSP IP/OBS MODERATE 35: CPT | Performed by: HOSPITALIST

## 2017-11-23 RX ADMIN — CEFTRIAXONE 2 G: 2 INJECTION, POWDER, FOR SOLUTION INTRAMUSCULAR; INTRAVENOUS at 09:12

## 2017-11-23 RX ADMIN — HYDROMORPHONE HYDROCHLORIDE 0.5 MG: 2 INJECTION INTRAMUSCULAR; INTRAVENOUS; SUBCUTANEOUS at 03:41

## 2017-11-23 RX ADMIN — Medication 325 MG: at 09:07

## 2017-11-23 RX ADMIN — PREGABALIN 150 MG: 75 CAPSULE ORAL at 22:40

## 2017-11-23 RX ADMIN — HYDROXYCHLOROQUINE SULFATE 200 MG: 200 TABLET, FILM COATED ORAL at 22:40

## 2017-11-23 RX ADMIN — OXYCODONE HYDROCHLORIDE 20 MG: 10 TABLET ORAL at 23:41

## 2017-11-23 RX ADMIN — PROMETHAZINE HYDROCHLORIDE 25 MG: 25 TABLET ORAL at 06:45

## 2017-11-23 RX ADMIN — HYDROMORPHONE HYDROCHLORIDE 0.5 MG: 2 INJECTION INTRAMUSCULAR; INTRAVENOUS; SUBCUTANEOUS at 22:45

## 2017-11-23 RX ADMIN — OXYCODONE HYDROCHLORIDE 20 MG: 10 TABLET ORAL at 12:03

## 2017-11-23 RX ADMIN — HYDROMORPHONE HYDROCHLORIDE 0.5 MG: 2 INJECTION INTRAMUSCULAR; INTRAVENOUS; SUBCUTANEOUS at 19:49

## 2017-11-23 RX ADMIN — PREGABALIN 150 MG: 75 CAPSULE ORAL at 09:07

## 2017-11-23 RX ADMIN — PROMETHAZINE HYDROCHLORIDE 25 MG: 25 TABLET ORAL at 19:49

## 2017-11-23 RX ADMIN — TIZANIDINE 8 MG: 4 TABLET ORAL at 22:39

## 2017-11-23 RX ADMIN — HYDROMORPHONE HYDROCHLORIDE 0.5 MG: 2 INJECTION INTRAMUSCULAR; INTRAVENOUS; SUBCUTANEOUS at 06:45

## 2017-11-23 RX ADMIN — HYDROMORPHONE HYDROCHLORIDE 0.5 MG: 2 INJECTION INTRAMUSCULAR; INTRAVENOUS; SUBCUTANEOUS at 09:07

## 2017-11-23 RX ADMIN — OXYCODONE HYDROCHLORIDE AND ACETAMINOPHEN 500 MG: 500 TABLET ORAL at 09:07

## 2017-11-23 RX ADMIN — HYDROMORPHONE HYDROCHLORIDE 0.5 MG: 2 INJECTION INTRAMUSCULAR; INTRAVENOUS; SUBCUTANEOUS at 13:26

## 2017-11-23 RX ADMIN — HEPARIN SODIUM 5000 UNITS: 5000 INJECTION, SOLUTION INTRAVENOUS; SUBCUTANEOUS at 06:09

## 2017-11-23 RX ADMIN — HEPARIN SODIUM 5000 UNITS: 5000 INJECTION, SOLUTION INTRAVENOUS; SUBCUTANEOUS at 13:26

## 2017-11-23 RX ADMIN — OXYCODONE HYDROCHLORIDE 20 MG: 10 TABLET ORAL at 17:36

## 2017-11-23 RX ADMIN — OXYCODONE HYDROCHLORIDE 20 MG: 10 TABLET ORAL at 06:07

## 2017-11-23 RX ADMIN — BUPROPION HYDROCHLORIDE 150 MG: 150 TABLET, EXTENDED RELEASE ORAL at 09:08

## 2017-11-23 RX ADMIN — VITAMIN D, TAB 1000IU (100/BT) 2000 UNITS: 25 TAB at 09:07

## 2017-11-23 RX ADMIN — HYDROMORPHONE HYDROCHLORIDE 0.5 MG: 2 INJECTION INTRAMUSCULAR; INTRAVENOUS; SUBCUTANEOUS at 17:36

## 2017-11-23 RX ADMIN — OXYCODONE HYDROCHLORIDE 20 MG: 10 TABLET ORAL at 00:08

## 2017-11-23 RX ADMIN — PREGABALIN 150 MG: 75 CAPSULE ORAL at 13:26

## 2017-11-23 RX ADMIN — PROMETHAZINE HYDROCHLORIDE 25 MG: 25 TABLET ORAL at 12:04

## 2017-11-23 RX ADMIN — HEPARIN SODIUM 5000 UNITS: 5000 INJECTION, SOLUTION INTRAVENOUS; SUBCUTANEOUS at 22:39

## 2017-11-23 RX ADMIN — TRAZODONE HYDROCHLORIDE 50 MG: 100 TABLET ORAL at 22:39

## 2017-11-23 ASSESSMENT — ENCOUNTER SYMPTOMS
EYE DISCHARGE: 0
ABDOMINAL PAIN: 0
BACK PAIN: 1
NAUSEA: 0
MYALGIAS: 1
CHILLS: 0
DIARRHEA: 0
FEVER: 0
ORTHOPNEA: 0
HALLUCINATIONS: 0
FLANK PAIN: 1
MYALGIAS: 0
SHORTNESS OF BREATH: 0
DIAPHORESIS: 0
VOMITING: 1
FOCAL WEAKNESS: 0
EYES NEGATIVE: 1
COUGH: 0
NAUSEA: 1
DIZZINESS: 0
HEARTBURN: 1
SPEECH CHANGE: 0
STRIDOR: 0
WHEEZING: 0
VOMITING: 0
NECK PAIN: 0
HEMOPTYSIS: 0
EYE REDNESS: 0
PALPITATIONS: 0
SENSORY CHANGE: 0

## 2017-11-23 ASSESSMENT — LIFESTYLE VARIABLES: SUBSTANCE_ABUSE: 0

## 2017-11-23 ASSESSMENT — PAIN SCALES - GENERAL
PAINLEVEL_OUTOF10: 8
PAINLEVEL_OUTOF10: 4
PAINLEVEL_OUTOF10: 8

## 2017-11-23 NOTE — PROGRESS NOTES
"1700 Pt back to room from dialysis. Pt continues to report N/V, with something \"stuck\" in her throat. Reports she has had a similar symptom before and they did a bronchoscopy. She states she will talk to MD In the morning about it. Phenergan given, pt has been able to keep medication down.  "
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
## 2017-11-23 NOTE — PROGRESS NOTES
PO 2 right arm AV graft thrombectomy and angioplasty.  Good thrill. No bleeding. Dialysis has run well.  Discussed she should call me immediately in the future when poor function so we can intervene before thrombosis.  She understands and did make an appointment but not soon enough.  We will sign off for now.

## 2017-11-23 NOTE — PROGRESS NOTES
HEMODIALYSIS NOTES:     HD today x 3 hours per Dr. Shaver. Initiated [id number] * and ended at 1626. Patient tolerated treatment. Patient had abdominal pain, feels like vomiting.  Please see paper flowsheet for details.    UF Net: 1,500 mL    Blood returned. Applied gauze and held R AVF site for 10 minutes. Verified no bleeding. Bruit and thrill present post dialysis. Instructions given to Primary RN that if bleeding occurs on the AVF site, change dressing and held the site with pressure.     Report given to IMANI Adames RN.

## 2017-11-23 NOTE — CARE PLAN
Problem: Pain Management  Goal: Pain level will decrease to patient's comfort goal    Intervention: Follow pain managment plan developed in collaboration with patient and Interdisciplinary Team  Dilaudid PRN, Oxy PRN and lidocaine patch PRN

## 2017-11-23 NOTE — PROGRESS NOTES
Shadia Manrique Fall Risk Assessment:     Last Known Fall: Within the last month  Mobility: Dizziness/generalized weakness  Medications: Cardiovascular or central nervous system meds  Mental Status/LOC/Awareness: Awake, alert, and oriented to date, place, and person  Toileting Needs: No needs  Volume/Electrolyte Status: Nausea/vomiting  Communication/Sensory: No deficits  Behavior: Appropriate behavior  Shadia Manrique Fall Risk Total: 9  Fall Risk Level: LOW RISK     Universal Fall Precautions:  call light/belongings in reach, bed in low position and locked, siderails up x 2, use non-slip footwear, adequate lighting, clutter free and spill free environment, educate on level of risk, educate to call for assistance     Fall Risk Level Interventions:   TRIAL (TELE 8, NEURO, MED GABBIE 5) Low Fall Risk Interventions  Place yellow fall risk ID band on patient: completed  Provide patient/family education based on risk assessment: completed  Educate patient/family to call staff for assistance when getting out of bed: completed  Place fall precaution signage outside patient door: completed       Patient Specific Interventions:      Medication: review medications with patient and family  Mental Status/LOC/Awareness: utilize bed/chair fall alarm  Toileting: instruct patient/family on the need to call for assistance when toileting  Volume/Electrolyte Status: ensure patient remains hydrated and administer medications as ordered for nausea and vomiting  Communication/Sensory: ensure proper positioning when transferrng/ambulating  Behavioral: instruct/reinforce fall program rationale  Mobility: ensure bed is locked and in lowest position

## 2017-11-23 NOTE — PROGRESS NOTES
Nephrology/Hemodialysis note    Patient with ESRD/HD admitted with clottedAVF, s/p declotting  Seen and examined during HD -tolerates well  AVF with good flow  Labs reviewed  Please see dialysis flow sheet for details

## 2017-11-23 NOTE — PROGRESS NOTES
[facility] Hospitalist Progress Note    Date of Service: 2017    Chief Complaint  37 y.o. Female hx of ESRD on HD, HTN ,  admitted 2017 with bilateral flank pain, non functioning AVF.     Interval Problem Update    Urine cx ngtd. Reports painful swallowing w regurging food  Improved today.  1.5 L fluid removed with HD yesterday. Insomnia improved.     Consultants/Specialty    Dr. Shaver- nephrology  Dr. Ardon- surgery     Disposition  Anticipate dc home when stable.         Review of Systems   Constitutional: Negative for chills, diaphoresis and fever.   HENT: Negative for congestion.    Eyes: Negative for discharge and redness.   Respiratory: Negative for cough, shortness of breath, wheezing and stridor.    Cardiovascular: Negative for chest pain and leg swelling.   Gastrointestinal: Positive for heartburn, nausea and vomiting. Negative for abdominal pain and diarrhea.   Genitourinary: Positive for flank pain and frequency. Negative for hematuria.        Improved.    Musculoskeletal: Positive for back pain (chronic lower back) and myalgias. Negative for neck pain.   Neurological: Negative for speech change and focal weakness.   Psychiatric/Behavioral: Negative for hallucinations and substance abuse.      Physical Exam  Laboratory/Imaging   Hemodynamics  Temp (24hrs), Av.8 °C (98.2 °F), Min:36.3 °C (97.4 °F), Max:37.1 °C (98.8 °F)   Temperature: 36.3 °C (97.4 °F)  Pulse  Av.4  Min: 65  Max: 126    Blood Pressure: 113/73      Respiratory      Respiration: 14, Pulse Oximetry: 98 %     Work Of Breathing / Effort: Mild  RUL Breath Sounds: Clear, RML Breath Sounds: Diminished, RLL Breath Sounds: Diminished, LASHANDA Breath Sounds: Clear, LLL Breath Sounds: Diminished    Fluids    Intake/Output Summary (Last 24 hours) at 17 1221  Last data filed at 17 1638   Gross per 24 hour   Intake              500 ml   Output             2000 ml   Net            -1500 ml       Nutrition  Orders Placed This  Encounter   Procedures   • Diet Order     Standing Status:   Standing     Number of Occurrences:   1     Order Specific Question:   Diet:     Answer:   Renal [8]     Order Specific Question:   Diet:     Answer:   Diabetic [3]     Physical Exam   Constitutional: She is oriented to person, place, and time. No distress.   HENT:   Head: Normocephalic and atraumatic.   Right Ear: External ear normal.   Left Ear: External ear normal.   Nose: Nose normal.   Eyes: Conjunctivae and EOM are normal. Right eye exhibits no discharge. Left eye exhibits no discharge.   Neck: Neck supple. No JVD present.   Cardiovascular: Normal rate and regular rhythm.    No murmur heard.  Pulmonary/Chest: Effort normal. No stridor. She has no wheezes. She has no rales.   Abdominal: Soft. Bowel sounds are normal. She exhibits no distension. There is no tenderness.   Obese.    Musculoskeletal: She exhibits tenderness. She exhibits no edema.   Rt arm AVF w thrill    Neurological: She is alert and oriented to person, place, and time. No cranial nerve deficit.   Skin: Skin is warm and dry. She is not diaphoretic. No pallor.   Psychiatric:   Appears anxious    Vitals reviewed.      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  Fu renal fxn, lytes.   Vascular surgery input.          Nausea & vomiting   Assessment & Plan    Due to  Pyelonephritis , metabolic cause.  Having odynophagia, reflux symptoms   Check barium esophogram  HD per nephro  IV atbs  Prn anti emetics.   Check barium esophogram to eval for stricture/ regurge cause.        Pyelonephritis- (present on admission)   Assessment & Plan    Bilateral flank pain, nausea.-improved  continue IV rocephin- fu final cx results.   Prn anti emetics  Continue support care  Hl ivfs         ESRD (end stage renal disease) on dialysis (CMS-HCC)- (present on admission)   Assessment & Plan    Thrombosed AVF repaired   Post HD .        Chronic lupus nephritis (CMS-HCC)- (present on admission)   Assessment & Plan    No flare up  Continue Plaquenil        Avascular necrosis (CMS-HCC)   Assessment & Plan    Chronic , bilateral hip- stable   Zanaflex  Prn oxycodone.           Depression- (present on admission)   Assessment & Plan    With insomnia   Continue wellbutrin   Resume Trazodone.        Low back pain- (present on admission)   Assessment & Plan    Chronic   lidoderm patch  zanaflex        Narcotic dependence (CMS-HCC)- (present on admission)   Assessment & Plan    Nephrology following  HD to be restarted            Reviewed items::  Medications reviewed, Labs reviewed and Radiology images reviewed  Taylor catheter::  No Taylor  DVT prophylaxis pharmacological::  Heparin  Antibiotics:  Treating active infection/contamination beyond 24 hours perioperative coverage

## 2017-11-23 NOTE — PROGRESS NOTES
Nephrology Progress Note, Adult, Complex               Author: Leana Carbajalflorencehanny Date & Time created: 11/23/2017  1:41 PM     Interval History:  38 y/o female with ESRD/HD admitted with clotted AVF  Doing better no complaints  AVF declotted  Good blood flow with HD  HD MWF  Review of Systems:  Review of Systems   Constitutional: Positive for malaise/fatigue. Negative for chills and fever.   HENT: Negative.    Eyes: Negative.    Respiratory: Negative for cough, hemoptysis, shortness of breath and wheezing.    Cardiovascular: Negative for chest pain, palpitations, orthopnea and leg swelling.   Gastrointestinal: Negative for abdominal pain, nausea and vomiting.   Genitourinary:        Making very little urine   Musculoskeletal: Positive for back pain and joint pain. Negative for myalgias.   Skin: Negative.    Neurological: Negative for dizziness, sensory change and focal weakness.       Physical Exam:  Physical Exam   Constitutional: She is oriented to person, place, and time. She appears well-developed and well-nourished. No distress.   HENT:   Head: Normocephalic and atraumatic.   Nose: Nose normal.   Mouth/Throat: Oropharynx is clear and moist.   Eyes: Conjunctivae and EOM are normal. Pupils are equal, round, and reactive to light. No scleral icterus.   Neck: Normal range of motion. Neck supple.   Cardiovascular: Normal rate and regular rhythm.  Exam reveals no gallop and no friction rub.    Pulmonary/Chest: Effort normal and breath sounds normal. No respiratory distress. She has no wheezes. She has no rales.   Abdominal: Soft. Bowel sounds are normal. She exhibits no distension. There is no tenderness.   Musculoskeletal: She exhibits no edema.   Neurological: She is alert and oriented to person, place, and time. No cranial nerve deficit.   Skin: Skin is warm. No rash noted. No erythema.   Nursing note and vitals reviewed.      Labs:        Invalid input(s): [id number]  Recent Labs      11/21/17   6237   TROPONINI   <0.01     Recent Labs      17   0819  17   SODIUM  138  135   POTASSIUM  4.4  4.6   CHLORIDE  94*  99   CO2  20  21   BUN  97*  95*   CREATININE  10.91*  11.21*   CALCIUM  9.3  9.1     Recent Labs      17   0819  17   ALTSGPT  17  16   ASTSGOT  12  59*   ALKPHOSPHAT  85  67   TBILIRUBIN  0.5  1.1   GLUCOSE  91  82     Recent Labs      17   RBC  4.28  3.60*   HEMOGLOBIN  13.7  11.9*   HEMATOCRIT  40.5  34.9*   PLATELETCT  141*  117*     Recent Labs      17   0817   WBC  9.6  6.5   --    NEUTSPOLYS  74.90*  61.00   --    LYMPHOCYTES  13.40*  20.10*   --    MONOCYTES  8.30  15.50*   --    EOSINOPHILS  2.40  2.50   --    BASOPHILS  0.60  0.60   --    ASTSGOT  12   --   59*   ALTSGPT  17   --   16   ALKPHOSPHAT  85   --   67   TBILIRUBIN  0.5   --   1.1           Hemodynamics:  Temp (24hrs), Av.8 °C (98.2 °F), Min:36.3 °C (97.4 °F), Max:37.1 °C (98.8 °F)  Temperature: 36.3 °C (97.4 °F)  Pulse  Av.4  Min: 65  Max: 126   Blood Pressure: 113/73     Respiratory:    Respiration: 14, Pulse Oximetry: 98 %     Work Of Breathing / Effort: Mild  RUL Breath Sounds: Clear, RML Breath Sounds: Diminished, RLL Breath Sounds: Diminished, LASHANDA Breath Sounds: Clear, LLL Breath Sounds: Diminished  Fluids:    Intake/Output Summary (Last 24 hours) at 17 1341  Last data filed at 17 1638   Gross per 24 hour   Intake              500 ml   Output             2000 ml   Net            -1500 ml        GI/Nutrition:  Orders Placed This Encounter   Procedures   • Diet Order     Standing Status:   Standing     Number of Occurrences:   1     Order Specific Question:   Diet:     Answer:   Renal [8]     Order Specific Question:   Diet:     Answer:   Diabetic [3]     Medical Decision Making, by Problem:  Active Hospital Problems    Diagnosis   • *A-V fistula (CMS-HCC) [I77.0]   • Pyelonephritis [N12]   • Nausea & vomiting [R11.2]    • ESRD (end stage renal disease) on dialysis (CMS-[facility]) [N18.6, Z99.2]   • Chronic lupus nephritis (CMS-HCC) [M32.14]   • Depression [F32.9]   • Low back pain [M54.5]   • Narcotic dependence (CMS-HCC) [F11.20]   • Avascular necrosis (CMS-HCC) [M87.00]         Reviewed items::  Labs reviewed and Medications reviewed  Assessment and Plan    1.ESRD/HD MWF  2.HTN: BP well controlled  3.Electrolytes: well controlled.  4.Anemia: Hb at goal  5.Vascular access, s/p declotting -appreciate  help  6.Volume:well controlled    Recs:   Diet renal   All meds to renal doses

## 2017-11-23 NOTE — CARE PLAN
Problem: Infection  Goal: Will remain free from infection    Intervention: Assess signs and symptoms of infection  Antibiotic administered as ordered, no signs of infection on port a cath.

## 2017-11-24 ENCOUNTER — APPOINTMENT (OUTPATIENT)
Dept: RADIOLOGY | Facility: MEDICAL CENTER | Age: 37
DRG: 252 | End: 2017-11-24
Attending: HOSPITALIST
Payer: MEDICARE

## 2017-11-24 VITALS
BODY MASS INDEX: 28.21 KG/M2 | WEIGHT: 169.31 LBS | OXYGEN SATURATION: 98 % | RESPIRATION RATE: 17 BRPM | DIASTOLIC BLOOD PRESSURE: 58 MMHG | SYSTOLIC BLOOD PRESSURE: 126 MMHG | TEMPERATURE: 96.8 F | HEART RATE: 76 BPM | HEIGHT: 65 IN

## 2017-11-24 PROCEDURE — 700111 HCHG RX REV CODE 636 W/ 250 OVERRIDE (IP): Performed by: HOSPITALIST

## 2017-11-24 PROCEDURE — 90935 HEMODIALYSIS ONE EVALUATION: CPT

## 2017-11-24 PROCEDURE — 99239 HOSP IP/OBS DSCHRG MGMT >30: CPT | Performed by: HOSPITALIST

## 2017-11-24 PROCEDURE — 700102 HCHG RX REV CODE 250 W/ 637 OVERRIDE(OP): Performed by: HOSPITALIST

## 2017-11-24 PROCEDURE — A9270 NON-COVERED ITEM OR SERVICE: HCPCS | Performed by: HOSPITALIST

## 2017-11-24 PROCEDURE — 700101 HCHG RX REV CODE 250: Performed by: HOSPITALIST

## 2017-11-24 PROCEDURE — 74220 X-RAY XM ESOPHAGUS 1CNTRST: CPT

## 2017-11-24 PROCEDURE — 700111 HCHG RX REV CODE 636 W/ 250 OVERRIDE (IP)

## 2017-11-24 RX ORDER — HEPARIN SODIUM 1000 [USP'U]/ML
INJECTION, SOLUTION INTRAVENOUS; SUBCUTANEOUS
Status: COMPLETED
Start: 2017-11-24 | End: 2017-11-24

## 2017-11-24 RX ADMIN — HEPARIN SODIUM 5000 UNITS: 5000 INJECTION, SOLUTION INTRAVENOUS; SUBCUTANEOUS at 05:07

## 2017-11-24 RX ADMIN — OXYCODONE HYDROCHLORIDE AND ACETAMINOPHEN 500 MG: 500 TABLET ORAL at 09:29

## 2017-11-24 RX ADMIN — PREGABALIN 150 MG: 75 CAPSULE ORAL at 09:29

## 2017-11-24 RX ADMIN — HEPARIN SODIUM 5000 UNITS: 5000 INJECTION, SOLUTION INTRAVENOUS; SUBCUTANEOUS at 13:29

## 2017-11-24 RX ADMIN — Medication 325 MG: at 09:29

## 2017-11-24 RX ADMIN — CEFTRIAXONE 2 G: 2 INJECTION, POWDER, FOR SOLUTION INTRAMUSCULAR; INTRAVENOUS at 13:29

## 2017-11-24 RX ADMIN — HYDROMORPHONE HYDROCHLORIDE 0.5 MG: 2 INJECTION INTRAMUSCULAR; INTRAVENOUS; SUBCUTANEOUS at 03:35

## 2017-11-24 RX ADMIN — BUPROPION HYDROCHLORIDE 150 MG: 150 TABLET, EXTENDED RELEASE ORAL at 13:28

## 2017-11-24 RX ADMIN — HEPARIN SODIUM 1500 UNITS: 1000 INJECTION, SOLUTION INTRAVENOUS; SUBCUTANEOUS at 09:50

## 2017-11-24 RX ADMIN — PROMETHAZINE HYDROCHLORIDE 25 MG: 25 TABLET ORAL at 09:29

## 2017-11-24 RX ADMIN — HYDROMORPHONE HYDROCHLORIDE 0.5 MG: 2 INJECTION INTRAMUSCULAR; INTRAVENOUS; SUBCUTANEOUS at 13:58

## 2017-11-24 RX ADMIN — OXYCODONE HYDROCHLORIDE 20 MG: 10 TABLET ORAL at 07:07

## 2017-11-24 RX ADMIN — VITAMIN D, TAB 1000IU (100/BT) 3000 UNITS: 25 TAB at 09:28

## 2017-11-24 RX ADMIN — OXYCODONE HYDROCHLORIDE 20 MG: 10 TABLET ORAL at 13:28

## 2017-11-24 RX ADMIN — PREGABALIN 150 MG: 75 CAPSULE ORAL at 13:59

## 2017-11-24 RX ADMIN — HYDROMORPHONE HYDROCHLORIDE 0.5 MG: 2 INJECTION INTRAMUSCULAR; INTRAVENOUS; SUBCUTANEOUS at 09:32

## 2017-11-24 RX ADMIN — HEPARIN 500 UNITS: 100 SYRINGE at 15:54

## 2017-11-24 RX ADMIN — HYDROMORPHONE HYDROCHLORIDE 0.5 MG: 2 INJECTION INTRAMUSCULAR; INTRAVENOUS; SUBCUTANEOUS at 07:07

## 2017-11-24 RX ADMIN — BARIUM SULFATE 700 MG: 700 TABLET ORAL at 09:00

## 2017-11-24 ASSESSMENT — PAIN SCALES - GENERAL
PAINLEVEL_OUTOF10: 7
PAINLEVEL_OUTOF10: 8
PAINLEVEL_OUTOF10: 7
PAINLEVEL_OUTOF10: 8

## 2017-11-24 NOTE — PROGRESS NOTES
Nephrology/Hemodialysis note    Patient with ESRD/HD admitted with clottedAVF, s/p declotting  Seen and examined during HD -tolerates well  VS stable  Labs reviewed  Please see dialysis flow sheet for details

## 2017-11-24 NOTE — PROGRESS NOTES
Shadia Manrique Fall Risk Assessment:     Last Known Fall: Within the last month  Mobility: Dizziness/generalized weakness  Medications: Cardiovascular or central nervous system meds  Mental Status/LOC/Awareness: Awake, alert, and oriented to date, place, and person  Toileting Needs: No needs  Volume/Electrolyte Status: No problems  Communication/Sensory: No deficits  Behavior: Appropriate behavior  Shadia Manrique Fall Risk Total: 6  Fall Risk Level: NO RISK    Universal Fall Precautions:  call light/belongings in reach, bed in low position and locked, wheelchairs and assistive devices out of sight, siderails up x 2, use non-slip footwear, adequate lighting, clutter free and spill free environment, educate on level of risk, educate to call for assistance    Fall Risk Level Interventions:   TRIAL (TELE 8, NEURO, MED GABBIE 5) Low Fall Risk Interventions  Place yellow fall risk ID band on patient: verified  Provide patient/family education based on risk assessment: completed  Educate patient/family to call staff for assistance when getting out of bed: completed  Place fall precaution signage outside patient door: verified      Patient Specific Interventions:     Medication: review medications with patient and family and limit combination of prn medications  Mental Status/LOC/Awareness: reorient patient, reinforce falls education, check on patient hourly and reinforce the use of call light  Toileting: provide frquent toileting, instruct patient/family on the use of grab bars, instruct patient/family on the need to call for assistance when toileting and do not leave patient unattended in bathroom/refer to toileting scripting  Volume/Electrolyte Status: ensure patient remains hydrated, administer medications as ordered for nausea and vomiting and monitor abnormal lab values  Communication/Sensory: update plan of care on whiteboard, ensure proper positioning when transferrng/ambulating and ensure patient has glasses/contacts  and hearing aids/dentures  Behavioral: encourage patient to voice feelings, engage patient in daily activities, administer medication as ordered and instruct/reinforce fall program rationale  Mobility: provide comfort measures during transport, dangle prior to standing, ensure bed is locked and in lowest position, provide appropriate assistive device and instruct patient to exit bed on their strongest side

## 2017-11-24 NOTE — DISCHARGE INSTRUCTIONS
"Discharge Instructions    Discharged to home by car with relative. Discharged via walking, hospital escort: Refused.  Special equipment needed: Not Applicable    Be sure to schedule a follow-up appointment with your primary care doctor or any specialists as instructed.     Discharge Plan:   Smoking Cessation Offered: Patient Refused  Influenza Vaccine Indication: Patient Refuses (pt states \"my doctor said its contraindicated\")    I understand that a diet low in cholesterol, fat, and sodium is recommended for good health. Unless I have been given specific instructions below for another diet, I accept this instruction as my diet prescription.   Other diet: Renal    Special Instructions: None    · Is patient discharged on Warfarin / Coumadin?   No     · Is patient Post Blood Transfusion?  No    Depression / Suicide Risk    As you are discharged from this [facility] facility, it is important to learn how to keep safe from harming yourself.    Recognize the warning signs:  · Abrupt changes in personality, positive or negative- including increase in energy   · Giving away possessions  · Change in eating patterns- significant weight changes-  positive or negative  · Change in sleeping patterns- unable to sleep or sleeping all the time   · Unwillingness or inability to communicate  · Depression  · Unusual sadness, discouragement and loneliness  · Talk of wanting to die  · Neglect of personal appearance   · Rebelliousness- reckless behavior  · Withdrawal from people/activities they love  · Confusion- inability to concentrate     If you or a loved one observes any of these behaviors or has concerns about self-harm, here's what you can do:  · Talk about it- your feelings and reasons for harming yourself  · Remove any means that you might use to hurt yourself (examples: pills, rope, extension cords, firearm)  · Get professional help from the community (Mental Health, Substance Abuse, psychological counseling)  · Do not be " alone:Call your Safe Contact- someone whom you trust who will be there for you.  · Call your local CRISIS HOTLINE 358-1191 or 188-812-6753  · Call your local Children's Mobile Crisis Response Team [place] (836) 976-5400 or www.Reble  · Call the toll free National Suicide Prevention Hotlines   · National Suicide Prevention Lifeline 275-262-SAAX (2267)  · National [employer] Line Network 800-SUICIDE (562-0254)

## 2017-11-24 NOTE — PROGRESS NOTES
[facility] Services Progress Note    Hemodialysis treatment ordered today per Dr. Shaver x 3 hours. Treatment initiated at 0955, ended at 1255.     Patient tolerated tx; see paper flow sheet for details.     Net UF 3000 mL.     Needles removed from access site. Dressings applied and sites held x 10 minutes; verified no bleeding. Positive bruit/thrill post tx. Staff RN instructed to monitor AVF for breakthrough bleeding. Should breakthrough bleeding occur staff RN instructed to apply pressure to access sites until bleeding resolved. Notify Dialysis and Nephrologist for follow-up.    Report given to Primary RN.

## 2017-11-24 NOTE — PROGRESS NOTES
Assumed care of pt from day RN. Pt in bed, call light within reach.  Medicated for pain per MAR. VSS. PERRLA.  Will monitor, assist and medicate per MAR for duration of shift.  Hourly rounding implemented.

## 2017-11-25 NOTE — PROGRESS NOTES
Pt d/c home per md order. MD said to De-access port.  Port de-accessed.  Discussed d/c instructions with pt. PT wheeled out with belongings with grandma at side.

## 2017-11-25 NOTE — DISCHARGE SUMMARY
Hospital Medicine Discharge Note     Admit Date:  11/21/2017       Discharge Date:   11/24/2017    Attending Physician:  Renny Mack M.D.      Diagnoses (includes active and resolved):     Principal Problem:    A-V fistula (CMS-HCC) POA: Yes thrombosis, post-thrombectomy, angioplasty 11/21/17, now functioning.   Active Problems:    Nausea & vomiting POA: Unknown, resolved    Chronic lupus nephritis (CMS-HCC) POA: Yes    ESRD (end stage renal disease) on dialysis (CMS-HCC) POA: Yes    Narcotic dependence (CMS-HCC) POA: Yes    Low back pain POA: Yes    Depression POA: Yes    Avascular necrosis (CMS-HCC) POA:       Overview: Bilateral hips      Chronic      At baseline, continue home regimen         Hospital Summary (Brief Narrative):         Patient is a 37-year-old female history of ESRD  hemodialysis dependent, lupus nephritis, avascular necrosis of hips, fibromyalgia, chronic low back pain who was admitted not functioning AV fistula, flank pain, nausea vomiting.  Patient had missed dialysis. Examination revealed absent thrill of right arm fistula.  Patient was consulting by Dr. Ardon and underwent  thrombectomy , angioplasty 11-21-17.  She had initial symptoms of flank pain with urinalysis revealing signs of infection. She was started on antibiotics. Later labs revealed negative urine culture and blood culture. Patient's white count normalized, was afebrile.  She reported symptoms of difficulty swallowing and regurgitation. Esophagram was negative. Felt  her symptoms of pain partly chronic component due to her low back pain, fibromyalgia, avascular necrosis due to missed dialysis.  With AV fistula fixed, resume dialysis, patient's symptoms markedly improved.  She tolerated diet decrease pain and nausea.  Patient was discharged home with up his follow-up primary care provider advised and dialysis as scheduled. She has a history of lupus and she was advised to follow-up with her rheumatologist.      Consultants:         Dr. Ardon, vascular surgery   , nephrology     Imaging/ Testing:      DX-ESOPHAGUS - BARIUM SWALLOW   Final Result      Unremarkable single contrast esophagram.      DX-CHEST-LIMITED (1 VIEW)   Final Result         No significant interval change.      HEMODIALYSIS ACCESS DUPLX   Final Result      DX-CHEST-PORTABLE (1 VIEW)   Final Result      No acute cardiopulmonary findings.      IR-THROMB INF ANGIO DIALYSIS CIRCUIT    (Results Pending)         Procedures:          DATE OF SERVICE:  11/21/2017     PREOPERATIVE DIAGNOSIS:  Stage V kidney disease with thrombosed right   brachiobasilic AV graft.     POSTOPERATIVE DIAGNOSIS:  Stage V kidney disease with thrombosed right   brachiobasilic AV graft.     OPERATIONS:  1.  Two sheath placements under ultrasound guidance, one directed antegrade   and one retrograde.  2.  Pulse-spray TPA lytic therapy, right arm AV graft.  3.  Mechanical AngioJet thrombectomy, right arm AV graft.  4.  Alex catheter thrombectomy, arterial anastomosis, AV graft.  4.  Balloon angioplasty, venous outflow, 8x40 mm Bartow.  Balloon angioplasty   AV graft 7x45 Bartow.  Balloon angioplasty, arterial anastomosis, 5x40   Bartow balloon.     SURGEON:  Shabbir Ardon MD    Discharge Medications:             Medication List      CONTINUE taking these medications      Instructions   amlodipine 5 MG Tabs  Commonly known as:  NORVASC   Take 5 mg by mouth as needed (Only if BP get 160).  Dose:  5 mg     ascorbic acid 500 MG Tabs  Commonly known as:  ascorbic acid   Take 500 mg by mouth every day.  Dose:  500 mg     calcium acetate 667 MG Caps  Commonly known as:  PHOS-LO   Take 2,668 mg by mouth 3 times a day, with meals.  Dose:  2668 mg     cholecalciferol 5000 UNIT Caps  Commonly known as:  VITAMIN D3   Take 10,000 Units by mouth every day.  Dose:  15199 Units     ferrous sulfate 325 (65 Fe) MG tablet   Take 325 mg by mouth every day.  Dose:  325 mg     hydroxychloroquine 200 MG  Tabs  Commonly known as:  PLAQUENIL   Take 200 mg by mouth every bedtime.  Dose:  200 mg     hydrOXYzine HCl 25 MG Tabs  Commonly known as:  ATARAX   Take 0.5-1 Tabs by mouth 3 times a day as needed for Anxiety.  Dose:  12.5-25 mg     lidocaine 5 % Ptch  Commonly known as:  LIDODERM   Apply 1 Patch to skin as directed as needed (For back pain).  Dose:  1 Patch     Oxycodone HCl 20 MG Tabs   Take 1 Tab by mouth every 6 hours as needed.  Dose:  1 Tab     pregabalin 150 MG Caps  Commonly known as:  LYRICA   Take 150 mg by mouth 3 times a day.  Dose:  150 mg     promethazine 25 MG Tabs  Commonly known as:  PHENERGAN   Take 25 mg by mouth every 6 hours as needed for Nausea/Vomiting.  Dose:  25 mg     tizanidine 4 MG Tabs  Commonly known as:  ZANAFLEX   Take 2 Tabs by mouth every bedtime.  Dose:  8 mg     trazodone 50 MG Tabs  Commonly known as:  DESYREL   Take 1 Tab by mouth every bedtime.  Dose:  50 mg     VOLTAREN 1 % Gel  Generic drug:  Diclofenac Sodium   Apply 1 Application to skin as directed as needed (For right leg).  Dose:  1 Application     WELLBUTRIN  MG XL tablet  Generic drug:  buPROPion   Take 150 mg by mouth every morning.  Dose:  150 mg               Diet:       DIET ORDERS (Through next 24h)    Renal             Activity:   As tolerated.      Code status:   Full code    Primary Care Provider:    uSshila Manzano M.D.    Follow up appointment details :      .  Sushila Manzano M.D.  [address] [place] [id number]  [phone]          Advise follow-up with Dr. Ardon Vascular surgery 2-3  weeks    Advise follow up with rheumatologist in 2-3 weeks        Time spent on discharge day patient visit: 4- minutes    #################################################

## 2017-11-27 ENCOUNTER — PATIENT OUTREACH (OUTPATIENT)
Dept: HEALTH INFORMATION MANAGEMENT | Facility: OTHER | Age: 37
End: 2017-11-27

## 2017-11-27 LAB
DSDNA AB TITR SER CLIF: DETECTED {TITER}
DSDNA IGG TITR SER CLIF: ABNORMAL {TITER}
ENA SM IGG SER-ACNC: 48 AU/ML (ref 0–40)
ENA SS-B IGG SER IA-ACNC: 1 AU/ML (ref 0–40)
NUCLEAR IGG SER QL IA: DETECTED
NUCLEAR IGG TITR SER IF: ABNORMAL {TITER}
SSA52 R0ENA AB IGG Q0420: 3 AU/ML (ref 0–40)
SSA60 R0ENA AB IGG Q0419: 27 AU/ML (ref 0–40)
U1 SNRNP IGG SER QL: 18 AU/ML (ref 0–40)

## 2017-12-06 ENCOUNTER — HOSPITAL ENCOUNTER (OUTPATIENT)
Facility: MEDICAL CENTER | Age: 37
End: 2017-12-06
Attending: SURGERY | Admitting: SURGERY
Payer: MEDICARE

## 2017-12-07 ENCOUNTER — RESOLUTE PROFESSIONAL BILLING HOSPITAL PROF FEE (OUTPATIENT)
Dept: HOSPITALIST | Facility: MEDICAL CENTER | Age: 37
End: 2017-12-07
Payer: MEDICARE

## 2017-12-07 ENCOUNTER — APPOINTMENT (OUTPATIENT)
Dept: RADIOLOGY | Facility: MEDICAL CENTER | Age: 37
DRG: 252 | End: 2017-12-07
Attending: STUDENT IN AN ORGANIZED HEALTH CARE EDUCATION/TRAINING PROGRAM
Payer: MEDICARE

## 2017-12-07 ENCOUNTER — HOSPITAL ENCOUNTER (INPATIENT)
Facility: MEDICAL CENTER | Age: 37
LOS: 2 days | DRG: 252 | End: 2017-12-10
Attending: EMERGENCY MEDICINE | Admitting: HOSPITALIST
Payer: MEDICARE

## 2017-12-07 DIAGNOSIS — N18.6 ESRD (END STAGE RENAL DISEASE) (HCC): ICD-10-CM

## 2017-12-07 DIAGNOSIS — T82.868A THROMBOSIS OF ARTERIOVENOUS DIALYSIS FISTULA, INITIAL ENCOUNTER (HCC): ICD-10-CM

## 2017-12-07 DIAGNOSIS — M32.14 LUPUS NEPHRITIS (HCC): ICD-10-CM

## 2017-12-07 DIAGNOSIS — N39.0 URINARY TRACT INFECTION WITHOUT HEMATURIA, SITE UNSPECIFIED: ICD-10-CM

## 2017-12-07 PROBLEM — T82.898A AV FISTULA OCCLUSION (HCC): Status: ACTIVE | Noted: 2017-12-07

## 2017-12-07 LAB
ALBUMIN SERPL BCP-MCNC: 3.8 G/DL (ref 3.2–4.9)
ALBUMIN/GLOB SERPL: 1.5 G/DL
ALP SERPL-CCNC: 87 U/L (ref 30–99)
ALT SERPL-CCNC: 7 U/L (ref 2–50)
ANION GAP SERPL CALC-SCNC: 16 MMOL/L (ref 0–11.9)
APPEARANCE UR: ABNORMAL
APTT PPP: 61.2 SEC (ref 24.7–36)
AST SERPL-CCNC: 9 U/L (ref 12–45)
BACTERIA #/AREA URNS HPF: ABNORMAL /HPF
BASOPHILS # BLD AUTO: 0.9 % (ref 0–1.8)
BASOPHILS # BLD: 0.06 K/UL (ref 0–0.12)
BILIRUB SERPL-MCNC: 0.4 MG/DL (ref 0.1–1.5)
BILIRUB UR QL STRIP.AUTO: NEGATIVE
BUN SERPL-MCNC: 80 MG/DL (ref 8–22)
CALCIUM SERPL-MCNC: 8.6 MG/DL (ref 8.5–10.5)
CHLORIDE SERPL-SCNC: 96 MMOL/L (ref 96–112)
CO2 SERPL-SCNC: 23 MMOL/L (ref 20–33)
COLOR UR: YELLOW
CREAT SERPL-MCNC: 8.49 MG/DL (ref 0.5–1.4)
CULTURE IF INDICATED INDCX: YES UA CULTURE
EOSINOPHIL # BLD AUTO: 0.32 K/UL (ref 0–0.51)
EOSINOPHIL NFR BLD: 4.9 % (ref 0–6.9)
EPI CELLS #/AREA URNS HPF: ABNORMAL /HPF
ERYTHROCYTE [DISTWIDTH] IN BLOOD BY AUTOMATED COUNT: 45.3 FL (ref 35.9–50)
GFR SERPL CREATININE-BSD FRML MDRD: 5 ML/MIN/1.73 M 2
GLOBULIN SER CALC-MCNC: 2.6 G/DL (ref 1.9–3.5)
GLUCOSE SERPL-MCNC: 89 MG/DL (ref 65–99)
GLUCOSE UR STRIP.AUTO-MCNC: 100 MG/DL
HBV SURFACE AG SER QL: NEGATIVE
HCT VFR BLD AUTO: 38.1 % (ref 37–47)
HGB BLD-MCNC: 12.7 G/DL (ref 12–16)
HYALINE CASTS #/AREA URNS LPF: ABNORMAL /LPF
IMM GRANULOCYTES # BLD AUTO: 0.06 K/UL (ref 0–0.11)
IMM GRANULOCYTES NFR BLD AUTO: 0.9 % (ref 0–0.9)
INR PPP: 1.01 (ref 0.87–1.13)
KETONES UR STRIP.AUTO-MCNC: NEGATIVE MG/DL
LEUKOCYTE ESTERASE UR QL STRIP.AUTO: ABNORMAL
LYMPHOCYTES # BLD AUTO: 2.07 K/UL (ref 1–4.8)
LYMPHOCYTES NFR BLD: 31.9 % (ref 22–41)
MCH RBC QN AUTO: 31.8 PG (ref 27–33)
MCHC RBC AUTO-ENTMCNC: 33.3 G/DL (ref 33.6–35)
MCV RBC AUTO: 95.5 FL (ref 81.4–97.8)
MICRO URNS: ABNORMAL
MONOCYTES # BLD AUTO: 0.66 K/UL (ref 0–0.85)
MONOCYTES NFR BLD AUTO: 10.2 % (ref 0–13.4)
NEUTROPHILS # BLD AUTO: 3.32 K/UL (ref 2–7.15)
NEUTROPHILS NFR BLD: 51.2 % (ref 44–72)
NITRITE UR QL STRIP.AUTO: NEGATIVE
NRBC # BLD AUTO: 0 K/UL
NRBC BLD AUTO-RTO: 0 /100 WBC
PH UR STRIP.AUTO: 8.5 [PH]
PLATELET # BLD AUTO: 130 K/UL (ref 164–446)
PMV BLD AUTO: 9.8 FL (ref 9–12.9)
POTASSIUM SERPL-SCNC: 4 MMOL/L (ref 3.6–5.5)
PROT SERPL-MCNC: 6.4 G/DL (ref 6–8.2)
PROT UR QL STRIP: 100 MG/DL
PROTHROMBIN TIME: 13 SEC (ref 12–14.6)
RBC # BLD AUTO: 3.99 M/UL (ref 4.2–5.4)
RBC # URNS HPF: ABNORMAL /HPF
RBC UR QL AUTO: NEGATIVE
SODIUM SERPL-SCNC: 135 MMOL/L (ref 135–145)
SP GR UR STRIP.AUTO: 1.01
UROBILINOGEN UR STRIP.AUTO-MCNC: 0.2 MG/DL
WBC # BLD AUTO: 6.5 K/UL (ref 4.8–10.8)
WBC #/AREA URNS HPF: ABNORMAL /HPF

## 2017-12-07 PROCEDURE — 90935 HEMODIALYSIS ONE EVALUATION: CPT

## 2017-12-07 PROCEDURE — 87040 BLOOD CULTURE FOR BACTERIA: CPT

## 2017-12-07 PROCEDURE — G0378 HOSPITAL OBSERVATION PER HR: HCPCS

## 2017-12-07 PROCEDURE — 85025 COMPLETE CBC W/AUTO DIFF WBC: CPT

## 2017-12-07 PROCEDURE — 99285 EMERGENCY DEPT VISIT HI MDM: CPT

## 2017-12-07 PROCEDURE — 700111 HCHG RX REV CODE 636 W/ 250 OVERRIDE (IP): Performed by: STUDENT IN AN ORGANIZED HEALTH CARE EDUCATION/TRAINING PROGRAM

## 2017-12-07 PROCEDURE — 87086 URINE CULTURE/COLONY COUNT: CPT

## 2017-12-07 PROCEDURE — 87340 HEPATITIS B SURFACE AG IA: CPT

## 2017-12-07 PROCEDURE — 5A1D70Z PERFORMANCE OF URINARY FILTRATION, INTERMITTENT, LESS THAN 6 HOURS PER DAY: ICD-10-PCS | Performed by: INTERNAL MEDICINE

## 2017-12-07 PROCEDURE — B543ZZA ULTRASONOGRAPHY OF RIGHT JUGULAR VEINS, GUIDANCE: ICD-10-PCS | Performed by: RADIOLOGY

## 2017-12-07 PROCEDURE — A9270 NON-COVERED ITEM OR SERVICE: HCPCS | Performed by: STUDENT IN AN ORGANIZED HEALTH CARE EDUCATION/TRAINING PROGRAM

## 2017-12-07 PROCEDURE — 81001 URINALYSIS AUTO W/SCOPE: CPT

## 2017-12-07 PROCEDURE — 99220 PR INITIAL OBSERVATION CARE,LEVL III: CPT | Mod: GC | Performed by: HOSPITALIST

## 2017-12-07 PROCEDURE — 85610 PROTHROMBIN TIME: CPT

## 2017-12-07 PROCEDURE — 700102 HCHG RX REV CODE 250 W/ 637 OVERRIDE(OP): Performed by: STUDENT IN AN ORGANIZED HEALTH CARE EDUCATION/TRAINING PROGRAM

## 2017-12-07 PROCEDURE — 85730 THROMBOPLASTIN TIME PARTIAL: CPT

## 2017-12-07 PROCEDURE — 700111 HCHG RX REV CODE 636 W/ 250 OVERRIDE (IP): Performed by: EMERGENCY MEDICINE

## 2017-12-07 PROCEDURE — 700111 HCHG RX REV CODE 636 W/ 250 OVERRIDE (IP)

## 2017-12-07 PROCEDURE — 77001 FLUOROGUIDE FOR VEIN DEVICE: CPT

## 2017-12-07 PROCEDURE — 700101 HCHG RX REV CODE 250

## 2017-12-07 PROCEDURE — 87186 SC STD MICRODIL/AGAR DIL: CPT | Mod: 91

## 2017-12-07 PROCEDURE — 87077 CULTURE AEROBIC IDENTIFY: CPT | Mod: 91

## 2017-12-07 PROCEDURE — 80053 COMPREHEN METABOLIC PANEL: CPT

## 2017-12-07 PROCEDURE — 96374 THER/PROPH/DIAG INJ IV PUSH: CPT

## 2017-12-07 PROCEDURE — 02H633Z INSERTION OF INFUSION DEVICE INTO RIGHT ATRIUM, PERCUTANEOUS APPROACH: ICD-10-PCS | Performed by: RADIOLOGY

## 2017-12-07 PROCEDURE — 96375 TX/PRO/DX INJ NEW DRUG ADDON: CPT

## 2017-12-07 RX ORDER — TRAZODONE HYDROCHLORIDE 50 MG/1
50 TABLET ORAL
Status: DISCONTINUED | OUTPATIENT
Start: 2017-12-07 | End: 2017-12-10 | Stop reason: HOSPADM

## 2017-12-07 RX ORDER — BISACODYL 10 MG
10 SUPPOSITORY, RECTAL RECTAL
Status: DISCONTINUED | OUTPATIENT
Start: 2017-12-07 | End: 2017-12-10 | Stop reason: HOSPADM

## 2017-12-07 RX ORDER — CALCIUM ACETATE 667 MG/1
667 TABLET ORAL
Status: DISCONTINUED | OUTPATIENT
Start: 2017-12-07 | End: 2017-12-10 | Stop reason: HOSPADM

## 2017-12-07 RX ORDER — HEPARIN SODIUM 1000 [USP'U]/ML
2000 INJECTION, SOLUTION INTRAVENOUS; SUBCUTANEOUS
Status: DISCONTINUED | OUTPATIENT
Start: 2017-12-07 | End: 2017-12-10 | Stop reason: HOSPADM

## 2017-12-07 RX ORDER — ASCORBIC ACID 500 MG
500 TABLET ORAL DAILY
Status: DISCONTINUED | OUTPATIENT
Start: 2017-12-07 | End: 2017-12-10 | Stop reason: HOSPADM

## 2017-12-07 RX ORDER — OXYCODONE HCL 20 MG/1
20 TABLET, FILM COATED, EXTENDED RELEASE ORAL EVERY 12 HOURS
Status: DISCONTINUED | OUTPATIENT
Start: 2017-12-07 | End: 2017-12-07

## 2017-12-07 RX ORDER — BUPROPION HYDROCHLORIDE 100 MG/1
100 TABLET ORAL DAILY
Status: DISCONTINUED | OUTPATIENT
Start: 2017-12-07 | End: 2017-12-10 | Stop reason: HOSPADM

## 2017-12-07 RX ORDER — LIDOCAINE HYDROCHLORIDE 10 MG/ML
INJECTION, SOLUTION INFILTRATION; PERINEURAL
Status: COMPLETED
Start: 2017-12-07 | End: 2017-12-07

## 2017-12-07 RX ORDER — ONDANSETRON 2 MG/ML
4 INJECTION INTRAMUSCULAR; INTRAVENOUS ONCE
Status: COMPLETED | OUTPATIENT
Start: 2017-12-07 | End: 2017-12-07

## 2017-12-07 RX ORDER — HYDROMORPHONE HYDROCHLORIDE 2 MG/ML
1 INJECTION, SOLUTION INTRAMUSCULAR; INTRAVENOUS; SUBCUTANEOUS ONCE
Status: COMPLETED | OUTPATIENT
Start: 2017-12-07 | End: 2017-12-07

## 2017-12-07 RX ORDER — SODIUM CHLORIDE 9 MG/ML
INJECTION, SOLUTION INTRAVENOUS CONTINUOUS
Status: DISCONTINUED | OUTPATIENT
Start: 2017-12-07 | End: 2017-12-07

## 2017-12-07 RX ORDER — ACETAMINOPHEN 325 MG/1
650 TABLET ORAL EVERY 6 HOURS PRN
Status: DISCONTINUED | OUTPATIENT
Start: 2017-12-07 | End: 2017-12-10 | Stop reason: HOSPADM

## 2017-12-07 RX ORDER — OXYCODONE HYDROCHLORIDE AND ACETAMINOPHEN 5; 325 MG/1; MG/1
1-2 TABLET ORAL EVERY 4 HOURS PRN
Status: DISCONTINUED | OUTPATIENT
Start: 2017-12-07 | End: 2017-12-07

## 2017-12-07 RX ORDER — POLYETHYLENE GLYCOL 3350 17 G/17G
1 POWDER, FOR SOLUTION ORAL
Status: DISCONTINUED | OUTPATIENT
Start: 2017-12-07 | End: 2017-12-10 | Stop reason: HOSPADM

## 2017-12-07 RX ORDER — AMOXICILLIN 250 MG
2 CAPSULE ORAL 2 TIMES DAILY
Status: DISCONTINUED | OUTPATIENT
Start: 2017-12-07 | End: 2017-12-10 | Stop reason: HOSPADM

## 2017-12-07 RX ORDER — PROMETHAZINE HYDROCHLORIDE 25 MG/1
25 TABLET ORAL EVERY 4 HOURS PRN
Status: DISCONTINUED | OUTPATIENT
Start: 2017-12-07 | End: 2017-12-10 | Stop reason: HOSPADM

## 2017-12-07 RX ORDER — HEPARIN SODIUM 1000 [USP'U]/ML
2400 INJECTION, SOLUTION INTRAVENOUS; SUBCUTANEOUS PRN
Status: DISCONTINUED | OUTPATIENT
Start: 2017-12-07 | End: 2017-12-10 | Stop reason: HOSPADM

## 2017-12-07 RX ORDER — HEPARIN SODIUM 5000 [USP'U]/ML
5000 INJECTION, SOLUTION INTRAVENOUS; SUBCUTANEOUS EVERY 8 HOURS
Status: DISCONTINUED | OUTPATIENT
Start: 2017-12-07 | End: 2017-12-10 | Stop reason: HOSPADM

## 2017-12-07 RX ORDER — HYDROXYCHLOROQUINE SULFATE 200 MG/1
200 TABLET, FILM COATED ORAL
Status: DISCONTINUED | OUTPATIENT
Start: 2017-12-07 | End: 2017-12-10 | Stop reason: HOSPADM

## 2017-12-07 RX ORDER — HYDROMORPHONE HYDROCHLORIDE 2 MG/ML
0.5 INJECTION, SOLUTION INTRAMUSCULAR; INTRAVENOUS; SUBCUTANEOUS ONCE
Status: COMPLETED | OUTPATIENT
Start: 2017-12-07 | End: 2017-12-07

## 2017-12-07 RX ORDER — PREGABALIN 150 MG/1
150 CAPSULE ORAL 3 TIMES DAILY
Status: DISCONTINUED | OUTPATIENT
Start: 2017-12-07 | End: 2017-12-08

## 2017-12-07 RX ORDER — LABETALOL HYDROCHLORIDE 5 MG/ML
10 INJECTION, SOLUTION INTRAVENOUS EVERY 4 HOURS PRN
Status: DISCONTINUED | OUTPATIENT
Start: 2017-12-07 | End: 2017-12-10 | Stop reason: HOSPADM

## 2017-12-07 RX ORDER — ONDANSETRON 2 MG/ML
4 INJECTION INTRAMUSCULAR; INTRAVENOUS EVERY 4 HOURS PRN
Status: DISCONTINUED | OUTPATIENT
Start: 2017-12-07 | End: 2017-12-10 | Stop reason: HOSPADM

## 2017-12-07 RX ORDER — LIDOCAINE HYDROCHLORIDE 10 MG/ML
1 INJECTION, SOLUTION INFILTRATION; PERINEURAL PRN
Status: DISCONTINUED | OUTPATIENT
Start: 2017-12-07 | End: 2017-12-10 | Stop reason: HOSPADM

## 2017-12-07 RX ORDER — CEFTRIAXONE 2 G/1
2 INJECTION, POWDER, FOR SOLUTION INTRAMUSCULAR; INTRAVENOUS ONCE
Status: COMPLETED | OUTPATIENT
Start: 2017-12-07 | End: 2017-12-07

## 2017-12-07 RX ORDER — AMLODIPINE BESYLATE 5 MG/1
5 TABLET ORAL
Status: DISCONTINUED | OUTPATIENT
Start: 2017-12-07 | End: 2017-12-10 | Stop reason: HOSPADM

## 2017-12-07 RX ORDER — FERROUS SULFATE 325(65) MG
325 TABLET ORAL DAILY
Status: DISCONTINUED | OUTPATIENT
Start: 2017-12-07 | End: 2017-12-10 | Stop reason: HOSPADM

## 2017-12-07 RX ORDER — OXYCODONE HCL 20 MG/1
20 TABLET, FILM COATED, EXTENDED RELEASE ORAL EVERY 12 HOURS
Status: DISCONTINUED | OUTPATIENT
Start: 2017-12-07 | End: 2017-12-08

## 2017-12-07 RX ORDER — OXYCODONE HCL 20 MG/1
20 TABLET, FILM COATED, EXTENDED RELEASE ORAL EVERY 6 HOURS
Status: DISCONTINUED | OUTPATIENT
Start: 2017-12-07 | End: 2017-12-07

## 2017-12-07 RX ORDER — TIZANIDINE 4 MG/1
8 TABLET ORAL
Status: DISCONTINUED | OUTPATIENT
Start: 2017-12-07 | End: 2017-12-10 | Stop reason: HOSPADM

## 2017-12-07 RX ADMIN — Medication 667 MG: at 20:43

## 2017-12-07 RX ADMIN — HEPARIN SODIUM 5000 UNITS: 5000 INJECTION, SOLUTION INTRAVENOUS; SUBCUTANEOUS at 13:45

## 2017-12-07 RX ADMIN — PREGABALIN 150 MG: 150 CAPSULE ORAL at 08:03

## 2017-12-07 RX ADMIN — Medication 325 MG: at 08:03

## 2017-12-07 RX ADMIN — TIZANIDINE 8 MG: 4 TABLET ORAL at 20:43

## 2017-12-07 RX ADMIN — HYDROMORPHONE HYDROCHLORIDE 1 MG: 2 INJECTION INTRAMUSCULAR; INTRAVENOUS; SUBCUTANEOUS at 01:48

## 2017-12-07 RX ADMIN — LIDOCAINE HYDROCHLORIDE: 10 INJECTION, SOLUTION INFILTRATION; PERINEURAL at 14:45

## 2017-12-07 RX ADMIN — OXYCODONE HYDROCHLORIDE 20 MG: 20 TABLET, FILM COATED, EXTENDED RELEASE ORAL at 20:44

## 2017-12-07 RX ADMIN — PROMETHAZINE HYDROCHLORIDE 25 MG: 25 TABLET ORAL at 15:57

## 2017-12-07 RX ADMIN — OXYCODONE HYDROCHLORIDE AND ACETAMINOPHEN 2 TABLET: 5; 325 TABLET ORAL at 09:13

## 2017-12-07 RX ADMIN — HEPARIN: 100 SYRINGE at 14:45

## 2017-12-07 RX ADMIN — OXYCODONE HYDROCHLORIDE AND ACETAMINOPHEN 500 MG: 500 TABLET ORAL at 08:04

## 2017-12-07 RX ADMIN — Medication 667 MG: at 05:07

## 2017-12-07 RX ADMIN — HYDROXYCHLOROQUINE SULFATE 200 MG: 200 TABLET ORAL at 20:43

## 2017-12-07 RX ADMIN — PROMETHAZINE HYDROCHLORIDE 25 MG: 25 TABLET ORAL at 11:01

## 2017-12-07 RX ADMIN — HEPARIN SODIUM 5000 UNITS: 5000 INJECTION, SOLUTION INTRAVENOUS; SUBCUTANEOUS at 20:43

## 2017-12-07 RX ADMIN — ONDANSETRON 4 MG: 2 INJECTION INTRAMUSCULAR; INTRAVENOUS at 01:48

## 2017-12-07 RX ADMIN — HYDROMORPHONE HYDROCHLORIDE 0.5 MG: 2 INJECTION INTRAMUSCULAR; INTRAVENOUS; SUBCUTANEOUS at 13:44

## 2017-12-07 RX ADMIN — CHOLECALCIFEROL TAB 10 MCG (400 UNIT) 400 UNITS: 10 TAB at 08:03

## 2017-12-07 RX ADMIN — OXYCODONE HYDROCHLORIDE 20 MG: 20 TABLET, FILM COATED, EXTENDED RELEASE ORAL at 08:03

## 2017-12-07 RX ADMIN — BUPROPION HYDROCHLORIDE 100 MG: 100 TABLET, FILM COATED ORAL at 09:12

## 2017-12-07 RX ADMIN — OXYCODONE HYDROCHLORIDE AND ACETAMINOPHEN 2 TABLET: 5; 325 TABLET ORAL at 05:07

## 2017-12-07 RX ADMIN — PREGABALIN 150 MG: 150 CAPSULE ORAL at 15:57

## 2017-12-07 RX ADMIN — CEFTRIAXONE SODIUM 2 G: 2 INJECTION, POWDER, FOR SOLUTION INTRAMUSCULAR; INTRAVENOUS at 02:25

## 2017-12-07 RX ADMIN — OXYCODONE HYDROCHLORIDE AND ACETAMINOPHEN 2 TABLET: 5; 325 TABLET ORAL at 15:57

## 2017-12-07 RX ADMIN — PREGABALIN 150 MG: 150 CAPSULE ORAL at 20:44

## 2017-12-07 RX ADMIN — TRAZODONE HYDROCHLORIDE 50 MG: 50 TABLET ORAL at 20:43

## 2017-12-07 RX ADMIN — Medication 667 MG: at 11:01

## 2017-12-07 RX ADMIN — HEPARIN SODIUM 5000 UNITS: 5000 INJECTION, SOLUTION INTRAVENOUS; SUBCUTANEOUS at 05:09

## 2017-12-07 ASSESSMENT — ENCOUNTER SYMPTOMS
HEARTBURN: 0
NECK PAIN: 1
MYALGIAS: 1
SEIZURES: 0
BRUISES/BLEEDS EASILY: 0
NAUSEA: 1
SORE THROAT: 0
WEAKNESS: 1
COUGH: 0
HEADACHES: 1
POLYDIPSIA: 0
ABDOMINAL PAIN: 1
FOCAL WEAKNESS: 0
FLANK PAIN: 1
SPEECH CHANGE: 0
SENSORY CHANGE: 1
HEADACHES: 0
DIARRHEA: 1
FEVER: 0
LOSS OF CONSCIOUSNESS: 0
ABDOMINAL PAIN: 0
VOMITING: 0
CHILLS: 1
BLURRED VISION: 0
DEPRESSION: 0
FALLS: 0
SINUS PAIN: 0
DOUBLE VISION: 0
VOMITING: 1
MYALGIAS: 0
FEVER: 1
DIARRHEA: 0
EYES NEGATIVE: 1
BACK PAIN: 1
PALPITATIONS: 0
SHORTNESS OF BREATH: 0
DIZZINESS: 0
CHILLS: 0
CONSTIPATION: 0

## 2017-12-07 ASSESSMENT — LIFESTYLE VARIABLES
DO YOU DRINK ALCOHOL: NO
DO YOU DRINK ALCOHOL: NO
EVER_SMOKED: NEVER

## 2017-12-07 ASSESSMENT — PAIN SCALES - GENERAL
PAINLEVEL_OUTOF10: 0
PAINLEVEL_OUTOF10: 8
PAINLEVEL_OUTOF10: 0
PAINLEVEL_OUTOF10: 8
PAINLEVEL_OUTOF10: 2
PAINLEVEL_OUTOF10: 8
PAINLEVEL_OUTOF10: 7
PAINLEVEL_OUTOF10: 0
PAINLEVEL_OUTOF10: 8

## 2017-12-07 ASSESSMENT — PATIENT HEALTH QUESTIONNAIRE - PHQ9
SUM OF ALL RESPONSES TO PHQ9 QUESTIONS 1 AND 2: 0
1. LITTLE INTEREST OR PLEASURE IN DOING THINGS: NOT AT ALL
1. LITTLE INTEREST OR PLEASURE IN DOING THINGS: NOT AT ALL
SUM OF ALL RESPONSES TO PHQ QUESTIONS 1-9: 0
2. FEELING DOWN, DEPRESSED, IRRITABLE, OR HOPELESS: NOT AT ALL
2. FEELING DOWN, DEPRESSED, IRRITABLE, OR HOPELESS: NOT AT ALL
SUM OF ALL RESPONSES TO PHQ QUESTIONS 1-9: 0
SUM OF ALL RESPONSES TO PHQ9 QUESTIONS 1 AND 2: 0

## 2017-12-07 NOTE — PROGRESS NOTES
Spoke with Dr. Rhoades regarding pt pain control issues. Pt reports little relief in pain. Intermittently resting with eyes closed. Continues to request IV pain meds. Dr. Rhoades is to round on pt to discuss pain control options. Dr. Rhoades to discuss plan r/t dialysis access with pt when rounding.

## 2017-12-07 NOTE — ASSESSMENT & PLAN NOTE
- Per pt rheum has some concern about possibility of flare, no notes suggesting change of mgmt however  - Continue home hydroxychloroquine

## 2017-12-07 NOTE — ASSESSMENT & PLAN NOTE
- Complains of dysuria, hematuria, and green urine; mentions fever and N/V  - PMH of ESBL pyelo in 2015, most recent UTI in 2/6/17 pansensitive E coli however  - UA suggestive of infection  -

## 2017-12-07 NOTE — PROGRESS NOTES
IR Procedure Note:    Dr. Ortega performed non-tunneled hemodialysis catheter placement to right  IJ.  The patient tolerated the procedure well with NO sedation given.  Report given to JOSETTE Santiago.  Pt transported to room in stable condition with transport.        [employer]-Trialysis 13F 15cm  REF [id number]  LOT [id number]

## 2017-12-07 NOTE — ED NOTES
Chief Complaint   Patient presents with   • Vascular Access Problem   • Fever   • Diarrhea     Patient ambulatory to triage. States that last dialysis was Monday due to graft not working. Was told to come to have catheter placed in neck for emergent dialysis. Patient also having fever of 102.3 F at home, and reports of diarrhea among other complaints. /98   Pulse 90   Temp 36.8 °C (98.2 °F)   Resp 16   Wt 82.1 kg (181 lb)   LMP 08/17/2017   SpO2 97%   BMI 30.12 kg/m²

## 2017-12-07 NOTE — ASSESSMENT & PLAN NOTE
- Last vomited morning prior to arrival, chronic, reoccurring since admission in November  - Possibly 2/2 UTI  - Currently asymptomatic  - Continue Zofran and phenergan PRN

## 2017-12-07 NOTE — PROGRESS NOTES
Pt continues to c/o 8/10 pain with little relief from percocet. Concerned with pain control issues. Discussed pain control at length with pt and goals. Encouraged to discuss with [facility] during rounds as MD was contacted by night shift with no new orders. Will continue with pain regime at this time until new orders.

## 2017-12-07 NOTE — H&P
Internal Medicine Admitting History and Physical    Note Author: Tima Tejeda M.D.       Name Debby Carrizales 1980   Age/Sex 37 y.o. female   MRN [id number]   Code Status FULL     After 5PM or if no immediate response to page, please call for cross-coverage  Attending/Team: Cindy/Madelyn See Patient List for primary contact information  Call [phone] to page    1st Call - Day Intern (R1):   [place] 2nd Call - Day Sr. Resident (R2/R3):   Ivonne       Chief Complaint:  Chief Complaint   Patient presents with   • Vascular Access Problem   • Fever   • Diarrhea       HPI:   Pt is a 36 yo F who presents with AVF failure. Pt has a PMH of ESRD (2/2 lupus nephritis, hemodialysis MWF), HTN, HLD, recurrent UTI (ESBL in ), Lupus, avascular necrosis of the hip b/l, AKANKSHA/MDD, and opioid dependency. Pt states she was at her rheum office the day of admission  and that her physician noted a lack of thrill in her AVF. Pt then called her vascular surgeon and infusion center and was instructed to come to the ED. Pt was recently hospitalized in late November for failed fistula and UTI symptoms. She underwent angioplasty/thrombectomy on . She was started on CTX at that time but urine cultures were negative so she was not DC on any abx. Pt states her urinary symptoms have persisted since then and still complains of dysuria, hematuria, flank pain, and green colored urine. Pt also complains of N/V and LLQ abd pain over that peroid of time as well as self reported F/C (highest 102.3 F per pt). Pt has chronic joint pain in her low back, hands, feet, and R knee. Pt states these are stable. She has had some recent sensation loss in her R knee but states it has resolved.    In the ED a CBC/CMP was performed which showed stable ESRD with no electrolyte abnormalities. UA was suggestive of infxn. Coag panel was wnl. Pt was subsequently admitted for treatment of her possible UTI and AVF failure.      Review of Systems   Constitutional: Positive for chills, fever and malaise/fatigue.   HENT: Negative for congestion and sore throat.    Eyes: Negative.    Respiratory: Negative for cough and shortness of breath.    Cardiovascular: Negative for chest pain and leg swelling.   Gastrointestinal: Positive for abdominal pain, nausea and vomiting. Negative for constipation and diarrhea.   Genitourinary: Positive for dysuria and flank pain.        Oliguric    Musculoskeletal: Positive for joint pain and myalgias.   Skin: Negative for rash.   Neurological: Positive for sensory change and weakness. Negative for dizziness, speech change, focal weakness and headaches.       Past Medical History:   Past Medical History:   Diagnosis Date   • Arthritis     all joints,r/t lupus   • Avascular necrosis of bones of both hips (HCC) 10/10/2016   • Clostridium difficile colitis 5/3/2011   • Dialysis patient    • ESBL (extended spectrum beta-lactamase) producing bacteria infection 8/25/2014   • Fibromyalgia    • Hypertension    • Lupus    • Pneumonia    • Psychiatric disorder     anxiety, depression   • Renal failure        Past Surgical History:  Past Surgical History:   Procedure Laterality Date   • THROMBECTOMY Right 8/21/2016    Procedure: THROMBECTOMY - right AV fistula graft with grams;  Surgeon: Shabbir Ardon M.D.;  Location: [facility];  Service:    • ANGIOPLASTY BALLOON  8/21/2016    Procedure: ANGIOPLASTY BALLOON;  Surgeon: Shabbir Ardon M.D.;  Location: [facility];  Service:    • THROMBECTOMY Right 8/20/2016    Procedure: THROMBECTOMY AV GRAFT;  Surgeon: Shabbir Ardon M.D.;  Location: [facility];  Service:    • AV FISTULA CREATION Right 7/12/2016    Procedure: AV FISTULA CREATION WITH GRAFT BRACHIAL AXILLARY;  Surgeon: Shabbir Ardon M.D.;  Location: SURGERY [facility];  Service:    • CLOSED REDUCTION Right 7/5/2016    Procedure: CLOSED REDUCTION- Hip ;   Surgeon: Michael Holman M.D.;  Location: SURGERY [facility];  Service:    • HIP ARTHROPLASTY TOTAL Right 1/18/2016    Procedure: HIP ARTHROPLASTY TOTAL;  Surgeon: Michael Holman M.D.;  Location: SURGERY [facility];  Service:    • AV FISTULA CREATION  2/3/2015    Performed by Shabbir Ardon M.D. at SURGERY [facility]   • AV FISTULA CREATION  11/14/2014    Performed by Shabbir Ardon M.D. at SURGERY [facility]   • AV FISTULA CREATION  9/9/2014    Performed by Shabbir Ardon M.D. at SURGERY [facility]   • CATH PLACEMENT  9/9/2014    Performed by Shabbir Ardon M.D. at SURGERY [facility]   • OTHER  5/2011    tracheostomy   • GASTROSCOPY-ENDO  4/27/2011    Performed by PALMIRA DOAN at ENDOSCOPY [facility]   • COLONOSCOPY WITH BIOPSY  4/20/2011    Performed by ALCIRA CRUZ at ENDOSCOPY [facility]   • GASTROSCOPY-ENDO  4/18/2011    Performed by ALCIRA CRUZ at ENDOSCOPY [facility]   • GASTROSCOPY-ENDO  4/10/2011    Performed by SYED JURADO at ENDOSCOPY [facility]   • SCLEROTHERAPHY  4/10/2011    Performed by SYED JURADO at ENDOSCOPY [facility]   • OTHER ABDOMINAL SURGERY      kidney biopsy   • TRACHEOSTOMY         Current Outpatient Medications:  Home Medications     Reviewed by Tim Hensley R.N. (Registered Nurse) on 12/07/17 at 0105  Med List Status: Partial   Medication Last Dose Status   amlodipine (NORVASC) 5 MG Tab prn Active   ascorbic acid (ASCORBIC ACID) 500 MG Tab 12/6/2017 Active   buPROPion (WELLBUTRIN XL) 150 MG XL tablet 12/6/2017 Active   calcium acetate (PHOS-LO) 667 MG Cap 12/6/2017 Active   cholecalciferol (VITAMIN D3) 5000 UNIT Cap 12/6/2017 Active   Diclofenac Sodium (VOLTAREN) 1 % Gel prn Active   ferrous sulfate 325 (65 FE) MG tablet 12/6/2017 Active   hydroxychloroquine (PLAQUENIL) 200 MG Tab 12/6/2017 Active   hydrOXYzine (ATARAX) 25 MG Tab stopped taking Active   lidocaine (LIDODERM) 5 % Patch  12/5/2017 Active   Oxycodone HCl 20 MG Tab 12/6/2017 Active   pregabalin (LYRICA) 150 MG Cap 12/6/2017 Active   promethazine (PHENERGAN) 25 MG Tab 12/6/2017 Active   tizanidine (ZANAFLEX) 4 MG Tab 12/5/2017 Active   trazodone (DESYREL) 50 MG Tab 12/5/2017 Active                Medication Allergy/Sensitivities:  Allergies   Allergen Reactions   • Ativan Anxiety     Patient becomes severely paranoid and agitated   • Morphine Itching     Tolerates Dilaudid   • Seasonal Runny Nose and Itching     Hay fever, sabiha brush       Family History:  History reviewed. No pertinent family history.    Social History:  Social History     Social History   • Marital status: Single     Spouse name: N/A   • Number of children: N/A   • Years of education: N/A     Occupational History   • Not on file.     Social History Main Topics   • Smoking status: Former Smoker     Packs/day: 0.50     Years: 18.00     Types: Cigarettes     Quit date: 6/13/2011   • Smokeless tobacco: Never Used      Comment: 1/2 ppd   • Alcohol use No      Comment: occ   • Drug use: No   • Sexual activity: Not on file     Other Topics Concern   • Not on file     Social History Narrative   • No narrative on file       PCP : Sushila Manzano M.D.      Physical Exam     Vitals:    12/07/17 0028 12/07/17 0037 12/07/17 0130   BP: 158/98     Pulse: 90  79   Resp: 16     Temp: 36.8 °C (98.2 °F)     SpO2: 97%  96%   Weight:  82.1 kg (181 lb)      Body mass index is 30.12 kg/m².  /98   Pulse 79   Temp 36.8 °C (98.2 °F)   Resp 16   Wt 82.1 kg (181 lb)   SpO2 96%   Breastfeeding? Unknown   BMI 30.12 kg/m²   O2 therapy: Pulse Oximetry: 96 %    Physical Exam   Constitutional: She is oriented to person, place, and time and well-developed, well-nourished, and in no distress.   HENT:   Head: Normocephalic and atraumatic.   Eyes: Pupils are equal, round, and reactive to light.   Neck: No JVD present.   Cardiovascular: Normal rate and regular rhythm.    No murmur  heard.  Pulmonary/Chest: Breath sounds normal.   Abdominal: Soft. She exhibits no distension. There is tenderness in the left lower quadrant.   Musculoskeletal: She exhibits no edema or deformity.   Neurological: She is alert and oriented to person, place, and time. GCS score is 15.   Sensation deficit present in R knee, sporadically occurs in hands/feet as well per pt   Skin: Skin is warm and dry.       Data Review       Old Records Request:   Completed  Current Records review and summary: Completed    Lab Data Review:  Recent Results (from the past 24 hour(s))   CBC WITH DIFFERENTIAL    Collection Time: 12/07/17  1:40 AM   Result Value Ref Range    WBC 6.5 4.8 - 10.8 K/uL    RBC 3.99 (L) 4.20 - 5.40 M/uL    Hemoglobin 12.7 12.0 - 16.0 g/dL    Hematocrit 38.1 37.0 - 47.0 %    MCV 95.5 81.4 - 97.8 fL    MCH 31.8 27.0 - 33.0 pg    MCHC 33.3 (L) 33.6 - 35.0 g/dL    RDW 45.3 35.9 - 50.0 fL    Platelet Count 130 (L) 164 - 446 K/uL    MPV 9.8 9.0 - 12.9 fL    Neutrophils-Polys 51.20 44.00 - 72.00 %    Lymphocytes 31.90 22.00 - 41.00 %    Monocytes 10.20 0.00 - 13.40 %    Eosinophils 4.90 0.00 - 6.90 %    Basophils 0.90 0.00 - 1.80 %    Immature Granulocytes 0.90 0.00 - 0.90 %    Nucleated RBC 0.00 /100 WBC    Neutrophils (Absolute) 3.32 2.00 - 7.15 K/uL    Lymphs (Absolute) 2.07 1.00 - 4.80 K/uL    Monos (Absolute) 0.66 0.00 - 0.85 K/uL    Eos (Absolute) 0.32 0.00 - 0.51 K/uL    Baso (Absolute) 0.06 0.00 - 0.12 K/uL    Immature Granulocytes (abs) 0.06 0.00 - 0.11 K/uL    NRBC (Absolute) 0.00 K/uL   COMP METABOLIC PANEL    Collection Time: 12/07/17  1:40 AM   Result Value Ref Range    Sodium 135 135 - 145 mmol/L    Potassium 4.0 3.6 - 5.5 mmol/L    Chloride 96 96 - 112 mmol/L    Co2 23 20 - 33 mmol/L    Anion Gap 16.0 (H) 0.0 - 11.9    Glucose 89 65 - 99 mg/dL    Bun 80 (HH) 8 - 22 mg/dL    Creatinine 8.49 (HH) 0.50 - 1.40 mg/dL    Calcium 8.6 8.5 - 10.5 mg/dL    AST(SGOT) 9 (L) 12 - 45 U/L    ALT(SGPT) 7 2 - 50 U/L     Alkaline Phosphatase 87 30 - 99 U/L    Total Bilirubin 0.4 0.1 - 1.5 mg/dL    Albumin 3.8 3.2 - 4.9 g/dL    Total Protein 6.4 6.0 - 8.2 g/dL    Globulin 2.6 1.9 - 3.5 g/dL    A-G Ratio 1.5 g/dL   PROTHROMBIN TIME    Collection Time: 12/07/17  1:40 AM   Result Value Ref Range    PT 13.0 12.0 - 14.6 sec    INR 1.01 0.87 - 1.13   APTT    Collection Time: 12/07/17  1:40 AM   Result Value Ref Range    APTT 61.2 (H) 24.7 - 36.0 sec   ESTIMATED GFR    Collection Time: 12/07/17  1:40 AM   Result Value Ref Range    GFR If  6 (A) >60 mL/min/1.73 m 2    GFR If Non African American 5 (A) >60 mL/min/1.73 m 2   URINALYSIS,CULTURE IF INDICATED    Collection Time: 12/07/17  1:41 AM   Result Value Ref Range    Color Yellow     Character Cloudy (A)     Specific Gravity 1.010 <1.035    Ph 8.5 (A) 5.0 - 8.0    Glucose 100 (A) Negative mg/dL    Ketones Negative Negative mg/dL    Protein 100 (A) Negative mg/dL    Bilirubin Negative Negative    Urobilinogen, Urine 0.2 Negative    Nitrite Negative Negative    Leukocyte Esterase Large (A) Negative    Occult Blood Negative Negative    Micro Urine Req Microscopic     Culture Indicated Yes UA Culture   URINE MICROSCOPIC (W/UA)    Collection Time: 12/07/17  1:41 AM   Result Value Ref Range    WBC  (A) /hpf    RBC 10-20 (A) /hpf    Bacteria Moderate (A) None /hpf    Epithelial Cells Few /hpf    Hyaline Cast 3-5 (A) /lpf       Imaging/Procedures Review:    ndependant Imaging Review: Completed  No orders to display        EKG:   No EKG       Assessment/Plan     #AV Fistual Failure/ESRD  -2/2 lupus nephritis, on MWF, last hemodialysis M, missed W due to AVF failure  -Pt contacted vascular herself, apt for fistula placement by Dr. Jansen on 12/9 per chart  -CMP shows markedly elevated BUN/Cr but lytes wnl  -Consult nephro in morning for dialysis and catheter placement  -Call vascular in morning to confirm that they are aware of hospitalization   -CTM anjali and BUN      #UTI  -Complains of dysuria, hematuria, and green urine; mentions fever and N/V  -PMH of ESBL pyelo in 2015, most recent UTI in 2/6/17 pansensitive E coli however  -UA suggestive of infxn   -SIRS 0/4, qSOFA 0/3; sepsis unlikely  -Given 1x of CTX in ED  -Continue CTX    #N/V  -Last vomited morning prior to arrival, chronic, reoccurring since admission in November  -Possibly 2/2 UTI  -Zofran and phenergan PRN     #Lupus   -Per pt rheum has some concern about possibility of flare, no notes suggesting change of mgmt however  -Continue home meds    #Avascular necrosis of hips  -Continue home pain meds    #MDD/AKANKSHA  -Continue home meds    #HTN/HLD  -Continue home meds    #Narcotic dependence  -Avoid IV narcotics if possible    Anticipated Hospital stay: Observation admit        Quality Measures    Reviewed items::  Labs reviewed and Medications reviewed  Taylor catheter::  No Taylor  DVT prophylaxis pharmacological::  Heparin

## 2017-12-07 NOTE — SENIOR ADMIT NOTE
Senior Admit Note    CC: AV fistula malfunction, nausea, vomiting, fevers    HPI: Ms. Carrizales is a very pleasant 37 year old female with PMHx significant for ESRD on MWF HD secondary to lupus nephritis, SLE, avascular necrosis, chronic back pain, dyslipidemia, hypertension, depression, chronic low back pain, fibromyalgia, history of recurrent UTIs presents to the ER after her rheumatologist (Dr. Collado) today noted that her AV fistula did not have a thrill.    She has a history of multiple surgeries on the AV fistula and had recently undergone a thrombectomy performed by Dr. Ardon and was discharged 11/23/17. She has last undergone dialysis on Monday and skipped dialysis today because of her AV fistula. In that admission, she was also treated for a UTI.    Currently she reports dysuria, hematuria, and flank pain since about 2 weeks. She also reports that nausea and vomiting and abdominal pain. Patient also reports she intermittently developed fevers and the highest she measured was 102.3F.    ROS:  Constitutional: Reports fevers, chills  HEENT: Denies nasal congestion, sore throat  Respiratory: Denies cough, shortness of breath  Cardiovascular: Denies chest pain, palpitations  Abdomen: Reports nausea, vomiting, abdominal pain  Neuro: Denies deficits, seizures    PHYSICAL EXAM:  Constitutional: Appears comfortable  HEENT: Normocephalic, atraumatic  Respiratory: Clear to auscultation  Cardiovascular: Regular rate and rhythm, no murmurs, rubs or gallops  Abdomen: Soft, mildly tender, normal bowel sounds  Neuro: CN II-XII grossly intact    Assessements:  1) AV fistula stenosis  2) End stage renal disease on HD MWF  3) Urinary tract infection although interstitial cystitis is also a possibility causing her these symptoms and lab findings.  4) Lupus nephritis  5) Systemic lupus erythematosus  6) Avascular necrosis of hip joints  7) Chronic low back pain  8) Hypertension  9) Dyslipidemia  10) Fibromyalgia    Plan:  1)  Patient reports that vascular surgery is aware. Chart review indicates that she is scheduled for the OR on 12/9/17.  2) Nephrology to be consulted in the AM.  3) Ceftriaxone for the UTI, discontinue if urine culture is negative.  4) Continue outpatient meds.    Core measures addressed appropriately. Please refer to Intern's H&P for more details.

## 2017-12-07 NOTE — ED PROVIDER NOTES
"ED Provider Note    ED Provider Note    Primary care provider: Sushila Manzano M.D.  Means of arrival: POV  History obtained from: Patient  History limited by: NOne    CHIEF COMPLAINT  Chief Complaint   Patient presents with   • Vascular Access Problem   • Fever   • Diarrhea       HPI  Debby Carrizales is a 37 y.o. female who presents to the Emergency DepartmentFor chief complaint of a clotted dialysis graft. Patient reports that they happen multiple times in the past. She's had multiple failed fistulas. Most recently, over \"Thanksgiving weekend\" she had a similar thing happened. She was admitted for angiography and thrombectomy. She had dialysis on Monday. She typically goes Monday Wednesday Friday. She followed up with her rheumatologist today and that graft was noted to be clotted. She was unable to go to dialysis. They nurse at the rheumatologist's office, attempted to reach the vascular surgeon that they were unable to see her urgently, and then recommended that she come to the emergency room for emergent access. Patient also reports continued dysuria as well as fevers. She states she had a \"arterial infection in her urine\" while she was admitted and she continues to have symptoms related to that. Patient also complains of worsening pain in her bilateral hips. She does have a history of avascular necrosis. She has chronic pain but everything seems to be flared up. She has seen Dr. Ardon in the past for vascular problems. Patient has a history of lupus.    REVIEW OF SYSTEMS  Review of Systems   Constitutional: Positive for fever.   HENT: Negative for congestion.    Respiratory: Negative for shortness of breath.    Cardiovascular: Negative for chest pain.   Gastrointestinal: Positive for diarrhea.   Genitourinary: Positive for dysuria.   Musculoskeletal: Positive for back pain.   Skin: Negative for rash.   Neurological: Negative for dizziness.   All other systems reviewed and are negative.      PAST " MEDICAL HISTORY   has a past medical history of Arthritis; Avascular necrosis of bones of both hips (HCC) (10/10/2016); Clostridium difficile colitis (5/3/2011); Dialysis patient; ESBL (extended spectrum beta-lactamase) producing bacteria infection (8/25/2014); Fibromyalgia; Hypertension; Lupus; Pneumonia (); Psychiatric disorder; and Renal failure.    SURGICAL HISTORY   has a past surgical history that includes gastroscopy-endo (4/10/2011); sclerotheraphy (4/10/2011); gastroscopy-endo (4/18/2011); colonoscopy with biopsy (4/20/2011); gastroscopy-endo (4/27/2011); other (5/2011); other abdominal surgery; av fistula creation (9/9/2014); cath placement (9/9/2014); av fistula creation (11/14/2014); av fistula creation (2/3/2015); hip arthroplasty total (Right, 1/18/2016); closed reduction (Right, 7/5/2016); av fistula creation (Right, 7/12/2016); thrombectomy (Right, 8/20/2016); thrombectomy (Right, 8/21/2016); angioplasty balloon (8/21/2016); and tracheostomy.    SOCIAL HISTORY  Social History   Substance Use Topics   • Smoking status: Former Smoker     Packs/day: 0.50     Years: 18.00     Types: Cigarettes     Quit date: 6/13/2011   • Smokeless tobacco: Never Used      Comment: 1/2 ppd   • Alcohol use No      Comment: occ      History   Drug Use No       FAMILY HISTORY  History reviewed. No pertinent family history.    CURRENT MEDICATIONS  Home Medications     Reviewed by Tim Hensley R.N. (Registered Nurse) on 12/07/17 at 0105  Med List Status: Partial   Medication Last Dose Status   amlodipine (NORVASC) 5 MG Tab prn Active   ascorbic acid (ASCORBIC ACID) 500 MG Tab 12/6/2017 Active   buPROPion (WELLBUTRIN XL) 150 MG XL tablet 12/6/2017 Active   calcium acetate (PHOS-LO) 667 MG Cap 12/6/2017 Active   cholecalciferol (VITAMIN D3) 5000 UNIT Cap 12/6/2017 Active   Diclofenac Sodium (VOLTAREN) 1 % Gel prn Active   ferrous sulfate 325 (65 FE) MG tablet 12/6/2017 Active   hydroxychloroquine (PLAQUENIL) 200 MG Tab  12/6/2017 Active   hydrOXYzine (ATARAX) 25 MG Tab stopped taking Active   lidocaine (LIDODERM) 5 % Patch 12/5/2017 Active   Oxycodone HCl 20 MG Tab 12/6/2017 Active   pregabalin (LYRICA) 150 MG Cap 12/6/2017 Active   promethazine (PHENERGAN) 25 MG Tab 12/6/2017 Active   tizanidine (ZANAFLEX) 4 MG Tab 12/5/2017 Active   trazodone (DESYREL) 50 MG Tab 12/5/2017 Active                ALLERGIES  Allergies   Allergen Reactions   • Ativan Anxiety     Patient becomes severely paranoid and agitated   • Morphine Itching     Tolerates Dilaudid   • Seasonal Runny Nose and Itching     Hay fever, sabiha brush       PHYSICAL EXAM  VITAL SIGNS: /98   Pulse 70   Temp 36.8 °C (98.2 °F)   Resp 16   Wt 82.1 kg (181 lb)   SpO2 94%   Breastfeeding? Unknown   BMI 30.12 kg/m²   Vitals reviewed.  Constitutional: Patient is oriented to person, place, and time. Appears well-developed and well-nourished. No distress.    Head: Normocephalic and atraumatic.   Ears: Normal external ears bilaterally.   Mouth/Throat: Oropharynx is clear and moist  Eyes: Conjunctivae are normal. Pupils are equal, round, and reactive to light.   Neck: Normal range of motion. Neck supple.  Cardiovascular: Normal rate, regular rhythm and normal heart sounds.   Pulmonary/Chest: Effort normal and breath sounds normal. No respiratory distress, no wheezes, rhonchi, or rales.   Abdominal: Soft. Bowel sounds are normal. There is no tenderness, rebound or guarding, or peritoneal signs. Bilateral CVAT.  Musculoskeletal: No edema.  Neurological: No focal deficits.   Skin: Skin is warm and dry. No erythema. No pallor. multiple well-healed scars of her right upper extremity. There is a fistula in her right upper extremity. No palpable thrill or audible bruit.  Psychiatric: Patient has a normal mood and affect.     LABS  Results for orders placed or performed during the hospital encounter of 12/07/17   CBC WITH DIFFERENTIAL   Result Value Ref Range    WBC 6.5 4.8 - 10.8  K/uL    RBC 3.99 (L) 4.20 - 5.40 M/uL    Hemoglobin 12.7 12.0 - 16.0 g/dL    Hematocrit 38.1 37.0 - 47.0 %    MCV 95.5 81.4 - 97.8 fL    MCH 31.8 27.0 - 33.0 pg    MCHC 33.3 (L) 33.6 - 35.0 g/dL    RDW 45.3 35.9 - 50.0 fL    Platelet Count 130 (L) 164 - 446 K/uL    MPV 9.8 9.0 - 12.9 fL    Neutrophils-Polys 51.20 44.00 - 72.00 %    Lymphocytes 31.90 22.00 - 41.00 %    Monocytes 10.20 0.00 - 13.40 %    Eosinophils 4.90 0.00 - 6.90 %    Basophils 0.90 0.00 - 1.80 %    Immature Granulocytes 0.90 0.00 - 0.90 %    Nucleated RBC 0.00 /100 WBC    Neutrophils (Absolute) 3.32 2.00 - 7.15 K/uL    Lymphs (Absolute) 2.07 1.00 - 4.80 K/uL    Monos (Absolute) 0.66 0.00 - 0.85 K/uL    Eos (Absolute) 0.32 0.00 - 0.51 K/uL    Baso (Absolute) 0.06 0.00 - 0.12 K/uL    Immature Granulocytes (abs) 0.06 0.00 - 0.11 K/uL    NRBC (Absolute) 0.00 K/uL   COMP METABOLIC PANEL   Result Value Ref Range    Sodium 135 135 - 145 mmol/L    Potassium 4.0 3.6 - 5.5 mmol/L    Chloride 96 96 - 112 mmol/L    Co2 23 20 - 33 mmol/L    Anion Gap 16.0 (H) 0.0 - 11.9    Glucose 89 65 - 99 mg/dL    Bun 80 (HH) 8 - 22 mg/dL    Creatinine 8.49 (HH) 0.50 - 1.40 mg/dL    Calcium 8.6 8.5 - 10.5 mg/dL    AST(SGOT) 9 (L) 12 - 45 U/L    ALT(SGPT) 7 2 - 50 U/L    Alkaline Phosphatase 87 30 - 99 U/L    Total Bilirubin 0.4 0.1 - 1.5 mg/dL    Albumin 3.8 3.2 - 4.9 g/dL    Total Protein 6.4 6.0 - 8.2 g/dL    Globulin 2.6 1.9 - 3.5 g/dL    A-G Ratio 1.5 g/dL   PROTHROMBIN TIME   Result Value Ref Range    PT 13.0 12.0 - 14.6 sec    INR 1.01 0.87 - 1.13   APTT   Result Value Ref Range    APTT 61.2 (H) 24.7 - 36.0 sec   URINALYSIS,CULTURE IF INDICATED   Result Value Ref Range    Color Yellow     Character Cloudy (A)     Specific Gravity 1.010 <1.035    Ph 8.5 (A) 5.0 - 8.0    Glucose 100 (A) Negative mg/dL    Ketones Negative Negative mg/dL    Protein 100 (A) Negative mg/dL    Bilirubin Negative Negative    Urobilinogen, Urine 0.2 Negative    Nitrite Negative Negative     Leukocyte Esterase Large (A) Negative    Occult Blood Negative Negative    Micro Urine Req Microscopic     Culture Indicated Yes UA Culture   URINE MICROSCOPIC (W/UA)   Result Value Ref Range    WBC  (A) /hpf    RBC 10-20 (A) /hpf    Bacteria Moderate (A) None /hpf    Epithelial Cells Few /hpf    Hyaline Cast 3-5 (A) /lpf   ESTIMATED GFR   Result Value Ref Range    GFR If  6 (A) >60 mL/min/1.73 m 2    GFR If Non African American 5 (A) >60 mL/min/1.73 m 2       All labs reviewed by me.    COURSE & MEDICAL DECISION MAKING  Pertinent Labs & Imaging studies reviewed. (See chart for details)    Obtained and reviewed past medical records. Patient was last seen and admitted on November 24. Principal problem with that of an AV fistula thrombosis. She underwent thrombectomy and angioplasty and function was returned. Patient has a history of end-stage renal disease she is on dialysis. She has chronic lupus nephritis. She's been diagnosed with narcotic dependence, depression and avascular necrosis of her bilateral hips.    1:05 AM - Patient seen and examined at bedside. This is unfortunately, a 37-year-old female with multiple chronic medical problems. She's had multiple failed fistulas. Currently, her fistula that she had been using for dialysis, recently clotted. She was able to be dialyzed on Monday, this is the last time, she missed dialysis today. She was instructed to come because she could not be seen urgently as an outpatient for vascular access. Patient will be treated with pain medication as well as nausea medication. Ordered urinalysis, lactate, blood cultures, CBC and CMP to evaluate her symptoms. The differential diagnoses include but are not limited to: Fistula, urinary tract infection, pyelonephritis, sepsis.    1:45 AM [facility] residents are in the department, I discussed with them this patient's situation. She will need admission to the hospital although, labs are not resulted yet.    2:16 AM,  patient was seen by the [facility] internal medicine residents. They will admit the patient. She does have evidence of a urinary tract infection and will be started on Rocephin. I have reviewed microbiology from previous urine cultures which do not show any growth. Potassium and bicarbonate were both normal. No need for emergent dialysis at this time, I discussed with the [facility] internal medicine resident will contact vascular surgery in the morning as there is no emergent intervention necessary at this time.    2:45 AM, the patient is reevaluated. We discussed the labs which show predicted abnormalities included an elevated creatinine. She also has evidence of a urinary tract infection and will be started on the antibiotics. She was grateful for the pain management, she states the last her to sleep. She'll be admitted to the hospital, she is aware, that we'll hold off on vascular consult, until the morning if there is no emergent intervention necessary at this time. Patient's doing well, she'll be admitted in stable but guarded condition.      FINAL IMPRESSION  1. Thrombosis of arteriovenous dialysis fistula, initial encounter (CMS-[facility])    2. ESRD (end stage renal disease) (CMS-HCC)    3. Lupus nephritis (CMS-HCC)    4. Urinary tract infection without hematuria, site unspecified

## 2017-12-07 NOTE — ED NOTES
Patient ambulated to bathroom with slow steady gait, standby assist. Urine sample obtained.  Lab called for second set of BC.

## 2017-12-07 NOTE — OR SURGEON
Immediate Post- Operative Note        PostOp Diagnosis: renal failure      Procedure(s): right temp dialysis catheter placement    Estimated Blood Loss: Less than 5 ml        Complications: None            12/7/2017     3:42 PM     Gaurav Ortega

## 2017-12-07 NOTE — ASSESSMENT & PLAN NOTE
- Patient on HD MWF  - Patient missed one session of hemodialysis  - No gross abnormalities with CMP  - Nephrology on board, Will follow recommendations  - Labs on AM

## 2017-12-07 NOTE — PROGRESS NOTES
Internal Medicine Interval Note  Note Author: Anastacio Allan M.D.     Name Debby Carrizales     1980   Age/Sex 37 y.o. female   MRN [id number]   Code Status Full      After 5PM or if no immediate response to page, please call for cross-coverage  Attending/Team: Edith See Patient List for primary contact information  Call [phone] to page    1st Call - Day Intern (R1):   [place]  2nd Call - Day Sr. Resident (R2/R3):   Ivonne         Reason for interval visit  (Principal Problem)   AV fistula occlusion    Interval Problem Daily Status Update  (24 hours)   Patient was evaluated at bedside and found AAOX3, afebrile and in NAD. Patient complaining of generalized pain, and appropriate sleep. Patient was stable vitals. No leukocytosis or signs of infection. As per nephrology ( , patient for temporary HD catheter and right after HD. Surgery on board. Patient scheduled for AV fistula angioplasty/thrombectomy. Patient to continue with home pain management regimen. One dose of low dose dilaudid  for breakthrough pain. Labs on AM.       Review of Systems   Constitutional: Positive for malaise/fatigue. Negative for chills and fever.   HENT: Negative for hearing loss, sinus pain, sore throat and tinnitus.    Eyes: Negative for blurred vision and double vision.   Respiratory: Negative for cough and shortness of breath.    Cardiovascular: Negative for chest pain and palpitations.   Gastrointestinal: Positive for nausea. Negative for abdominal pain, heartburn and vomiting.   Genitourinary: Positive for dysuria and flank pain. Negative for frequency and urgency.   Musculoskeletal: Positive for back pain, joint pain and neck pain. Negative for falls and myalgias.   Skin: Negative for itching and rash.   Neurological: Positive for weakness and headaches. Negative for dizziness, focal weakness, seizures and loss of consciousness.   Endo/Heme/Allergies: Negative for polydipsia. Does not  "bruise/bleed easily.   Psychiatric/Behavioral: Negative for depression and suicidal ideas.       Consultants/Specialty  Nephrology  Surgery    Disposition  Inpatient    Quality Measures    Reviewed items::  EKG reviewed, Labs reviewed, Medications reviewed and Radiology images reviewed  Taylor catheter::  No Taylor  DVT prophylaxis pharmacological::  Heparin  DVT prophylaxis - mechanical:  Not indicated at this time, ambulatory  Ulcer Prophylaxis::  Not indicated          Physical Exam       Vitals:    12/07/17 0215 12/07/17 0230 12/07/17 0405 12/07/17 0655   BP:   125/82 112/68   Pulse: 73 70 67 90   Resp:   18 16   Temp:   36.8 °C (98.2 °F) 36.5 °C (97.7 °F)   SpO2: 93% 94% 95% 93%   Weight:   82 kg (180 lb 12.4 oz)    Height:   1.651 m (5' 5\")      Body mass index is 30.08 kg/m². Weight: 82 kg (180 lb 12.4 oz)  Oxygen Therapy:  Pulse Oximetry: 93 %, O2 (LPM): 0, O2 Delivery: None (Room Air)    Physical Exam   Constitutional: She is oriented to person, place, and time and well-developed, well-nourished, and in no distress. No distress.   HENT:   Head: Normocephalic and atraumatic.   Eyes: Conjunctivae and EOM are normal. Pupils are equal, round, and reactive to light. Right eye exhibits no discharge. Left eye exhibits no discharge. No scleral icterus.   Neck: Normal range of motion. Neck supple. No JVD present.   Cardiovascular: Normal rate and regular rhythm.  Exam reveals no gallop and no friction rub.    No murmur heard.  Pulmonary/Chest: Effort normal and breath sounds normal. No respiratory distress.   Abdominal: Soft. Bowel sounds are normal. She exhibits no distension. There is no tenderness.   Musculoskeletal: Normal range of motion. She exhibits tenderness. She exhibits no edema.   AV fistula to the right ARM  No erythema, or edema  Area warm to touch   Lymphadenopathy:     She has no cervical adenopathy.   Neurological: She is alert and oriented to person, place, and time. No cranial nerve deficit. GCS " score is 15.   Skin: Skin is warm and dry. No rash noted. She is not diaphoretic. No erythema. No pallor.   Psychiatric: Mood, memory, affect and judgment normal.       Lab Data Review:     12/7/2017  3:00 PM    Recent Labs      12/07/17   0140   SODIUM  135   POTASSIUM  4.0   CHLORIDE  96   CO2  23   BUN  80*   CREATININE  8.49*   CALCIUM  8.6       Recent Labs      12/07/17   0140   ALTSGPT  7   ASTSGOT  9*   ALKPHOSPHAT  87   TBILIRUBIN  0.4   GLUCOSE  89       Recent Labs      12/07/17   0140   RBC  3.99*   HEMOGLOBIN  12.7   HEMATOCRIT  38.1   PLATELETCT  130*   PROTHROMBTM  13.0   APTT  61.2*   INR  1.01       Recent Labs      12/07/17   0140   WBC  6.5   NEUTSPOLYS  51.20   LYMPHOCYTES  31.90   MONOCYTES  10.20   EOSINOPHILS  4.90   BASOPHILS  0.90   ASTSGOT  9*   ALTSGPT  7   ALKPHOSPHAT  87   TBILIRUBIN  0.4           Assessment/Plan     AV fistula occlusion (CMS-HCC)   Assessment & Plan    - No thrill appreciated on fish auscultation  - AV fistula site warm   - As per nephrology, patient for HD once temporary catheter placed  - Consulted surgery suggested for IR to place temporary catheter  - Pending IR placement of temporary hemodialysis catheter  - As per surgery, patient scheduled for fistula angioplasty/thrombectomy        ESRD (end stage renal disease) on dialysis (CMS-HCC)- (present on admission)   Assessment & Plan    - Patient on HD MWF  - Patient missed one session of hemodialysis  - No gross abnormalities with [facility]  - Nephrology on board, Will follow recommendations  - Patient for hemodialysis after IR placement of temporary HD catheter  - Labs on AM        Chronic lupus nephritis (CMS-HCC)- (present on admission)   Assessment & Plan    - Per pt rheum has some concern about possibility of flare, no notes suggesting change of mgmt however  - Continue home hydroxychloroquine        UTI (urinary tract infection)- (present on admission)   Assessment & Plan    - Complains of dysuria, hematuria, and  green urine; mentions fever and N/V  - PMH of ESBL pyelo in 2015, most recent UTI in 2/6/17 pansensitive E coli however  - UA suggestive of infection  - Continue C3        Hypertension- (present on admission)   Assessment & Plan    - Continue home amlodipine        Avascular necrosis of bones of both hips (HCC)- (present on admission)   Assessment & Plan    - Continue home pain regimen        Nausea & vomiting- (present on admission)   Assessment & Plan    - Last vomited morning prior to arrival, chronic, reoccurring since admission in November  - Possibly 2/2 UTI  - Continue Zofran and phenergan PRN         Depression- (present on admission)   Assessment & Plan    - Continue home bupropion        Drug-seeking behavior- (present on admission)   Assessment & Plan    - Patient with history of drug-seeking behavior  - Limited IV narcotic use

## 2017-12-07 NOTE — PROGRESS NOTES
HD called requesting pt for dialysis. IR notified and transport redirected to dialysis. Per IR pt VSS and tolerated procedure well.

## 2017-12-07 NOTE — PROGRESS NOTES
"Pt is a/o x4, recently here in hospital for uti and had av fistula revision   Fistula right upper arm with small suture  No bruit or thrill present   Pt states that she falls around once a week as she has hip problems and her legs \"just give out\"  C/o hip pain   meds as ordered   Assessment completed   Call light within reach and pt uses appropriately  "

## 2017-12-08 PROBLEM — N18.6 ESRD (END STAGE RENAL DISEASE) (HCC): Status: ACTIVE | Noted: 2017-12-08

## 2017-12-08 LAB
ALBUMIN SERPL BCP-MCNC: 3.7 G/DL (ref 3.2–4.9)
ALBUMIN/GLOB SERPL: 1.3 G/DL
ALP SERPL-CCNC: 91 U/L (ref 30–99)
ALT SERPL-CCNC: 10 U/L (ref 2–50)
ANION GAP SERPL CALC-SCNC: 12 MMOL/L (ref 0–11.9)
AST SERPL-CCNC: 11 U/L (ref 12–45)
BASOPHILS # BLD AUTO: 1.3 % (ref 0–1.8)
BASOPHILS # BLD: 0.06 K/UL (ref 0–0.12)
BILIRUB SERPL-MCNC: 0.4 MG/DL (ref 0.1–1.5)
BUN SERPL-MCNC: 50 MG/DL (ref 8–22)
CALCIUM SERPL-MCNC: 9.5 MG/DL (ref 8.5–10.5)
CHLORIDE SERPL-SCNC: 102 MMOL/L (ref 96–112)
CO2 SERPL-SCNC: 23 MMOL/L (ref 20–33)
CREAT SERPL-MCNC: 6.5 MG/DL (ref 0.5–1.4)
EOSINOPHIL # BLD AUTO: 0.2 K/UL (ref 0–0.51)
EOSINOPHIL NFR BLD: 4.5 % (ref 0–6.9)
ERYTHROCYTE [DISTWIDTH] IN BLOOD BY AUTOMATED COUNT: 46.9 FL (ref 35.9–50)
GFR SERPL CREATININE-BSD FRML MDRD: 7 ML/MIN/1.73 M 2
GLOBULIN SER CALC-MCNC: 2.9 G/DL (ref 1.9–3.5)
GLUCOSE SERPL-MCNC: 105 MG/DL (ref 65–99)
HCT VFR BLD AUTO: 41.3 % (ref 37–47)
HGB BLD-MCNC: 12.9 G/DL (ref 12–16)
IMM GRANULOCYTES # BLD AUTO: 0.06 K/UL (ref 0–0.11)
IMM GRANULOCYTES NFR BLD AUTO: 1.3 % (ref 0–0.9)
LYMPHOCYTES # BLD AUTO: 1.3 K/UL (ref 1–4.8)
LYMPHOCYTES NFR BLD: 29.1 % (ref 22–41)
MAGNESIUM SERPL-MCNC: 2.7 MG/DL (ref 1.5–2.5)
MCH RBC QN AUTO: 30.6 PG (ref 27–33)
MCHC RBC AUTO-ENTMCNC: 31.2 G/DL (ref 33.6–35)
MCV RBC AUTO: 98.1 FL (ref 81.4–97.8)
MONOCYTES # BLD AUTO: 0.51 K/UL (ref 0–0.85)
MONOCYTES NFR BLD AUTO: 11.4 % (ref 0–13.4)
NEUTROPHILS # BLD AUTO: 2.34 K/UL (ref 2–7.15)
NEUTROPHILS NFR BLD: 52.4 % (ref 44–72)
NRBC # BLD AUTO: 0 K/UL
NRBC BLD AUTO-RTO: 0 /100 WBC
PHOSPHATE SERPL-MCNC: 7.9 MG/DL (ref 2.5–4.5)
PLATELET # BLD AUTO: 130 K/UL (ref 164–446)
PMV BLD AUTO: 10.5 FL (ref 9–12.9)
POTASSIUM SERPL-SCNC: 5.1 MMOL/L (ref 3.6–5.5)
PROT SERPL-MCNC: 6.6 G/DL (ref 6–8.2)
RBC # BLD AUTO: 4.21 M/UL (ref 4.2–5.4)
SODIUM SERPL-SCNC: 137 MMOL/L (ref 135–145)
WBC # BLD AUTO: 4.5 K/UL (ref 4.8–10.8)

## 2017-12-08 PROCEDURE — 700111 HCHG RX REV CODE 636 W/ 250 OVERRIDE (IP): Performed by: INTERNAL MEDICINE

## 2017-12-08 PROCEDURE — 90935 HEMODIALYSIS ONE EVALUATION: CPT

## 2017-12-08 PROCEDURE — 84100 ASSAY OF PHOSPHORUS: CPT

## 2017-12-08 PROCEDURE — 700102 HCHG RX REV CODE 250 W/ 637 OVERRIDE(OP): Performed by: STUDENT IN AN ORGANIZED HEALTH CARE EDUCATION/TRAINING PROGRAM

## 2017-12-08 PROCEDURE — 700111 HCHG RX REV CODE 636 W/ 250 OVERRIDE (IP)

## 2017-12-08 PROCEDURE — 99232 SBSQ HOSP IP/OBS MODERATE 35: CPT | Mod: GC | Performed by: HOSPITALIST

## 2017-12-08 PROCEDURE — A9270 NON-COVERED ITEM OR SERVICE: HCPCS | Performed by: STUDENT IN AN ORGANIZED HEALTH CARE EDUCATION/TRAINING PROGRAM

## 2017-12-08 PROCEDURE — 700111 HCHG RX REV CODE 636 W/ 250 OVERRIDE (IP): Performed by: STUDENT IN AN ORGANIZED HEALTH CARE EDUCATION/TRAINING PROGRAM

## 2017-12-08 PROCEDURE — 700101 HCHG RX REV CODE 250: Performed by: STUDENT IN AN ORGANIZED HEALTH CARE EDUCATION/TRAINING PROGRAM

## 2017-12-08 PROCEDURE — 85025 COMPLETE CBC W/AUTO DIFF WBC: CPT

## 2017-12-08 PROCEDURE — 83735 ASSAY OF MAGNESIUM: CPT

## 2017-12-08 PROCEDURE — 770006 HCHG ROOM/CARE - MED/SURG/GYN SEMI*

## 2017-12-08 PROCEDURE — 80053 COMPREHEN METABOLIC PANEL: CPT

## 2017-12-08 RX ORDER — OXYCODONE HYDROCHLORIDE 10 MG/1
20 TABLET ORAL EVERY 6 HOURS
Status: DISCONTINUED | OUTPATIENT
Start: 2017-12-08 | End: 2017-12-10 | Stop reason: HOSPADM

## 2017-12-08 RX ORDER — HEPARIN SODIUM 1000 [USP'U]/ML
INJECTION, SOLUTION INTRAVENOUS; SUBCUTANEOUS
Status: COMPLETED
Start: 2017-12-08 | End: 2017-12-08

## 2017-12-08 RX ORDER — LIDOCAINE 50 MG/G
1 PATCH TOPICAL EVERY 24 HOURS
Status: DISCONTINUED | OUTPATIENT
Start: 2017-12-08 | End: 2017-12-10 | Stop reason: HOSPADM

## 2017-12-08 RX ORDER — PREGABALIN 75 MG/1
75 CAPSULE ORAL
Status: DISCONTINUED | OUTPATIENT
Start: 2017-12-08 | End: 2017-12-10 | Stop reason: HOSPADM

## 2017-12-08 RX ORDER — PREGABALIN 25 MG/1
50 CAPSULE ORAL DAILY
Status: DISCONTINUED | OUTPATIENT
Start: 2017-12-08 | End: 2017-12-08

## 2017-12-08 RX ORDER — PREGABALIN 75 MG/1
75 CAPSULE ORAL DAILY
Status: DISCONTINUED | OUTPATIENT
Start: 2017-12-09 | End: 2017-12-10 | Stop reason: HOSPADM

## 2017-12-08 RX ADMIN — BUPROPION HYDROCHLORIDE 100 MG: 100 TABLET, FILM COATED ORAL at 15:28

## 2017-12-08 RX ADMIN — CEFTRIAXONE 2 G: 2 INJECTION, POWDER, FOR SOLUTION INTRAMUSCULAR; INTRAVENOUS at 09:29

## 2017-12-08 RX ADMIN — HEPARIN SODIUM 2400 UNITS: 1000 INJECTION, SOLUTION INTRAVENOUS; SUBCUTANEOUS at 15:00

## 2017-12-08 RX ADMIN — HEPARIN SODIUM 5000 UNITS: 5000 INJECTION, SOLUTION INTRAVENOUS; SUBCUTANEOUS at 21:13

## 2017-12-08 RX ADMIN — ACETAMINOPHEN 650 MG: 325 TABLET, FILM COATED ORAL at 21:13

## 2017-12-08 RX ADMIN — CHOLECALCIFEROL TAB 10 MCG (400 UNIT) 400 UNITS: 10 TAB at 09:28

## 2017-12-08 RX ADMIN — ONDANSETRON 4 MG: 2 INJECTION INTRAMUSCULAR; INTRAVENOUS at 15:41

## 2017-12-08 RX ADMIN — PREGABALIN 50 MG: 25 CAPSULE ORAL at 10:35

## 2017-12-08 RX ADMIN — Medication 325 MG: at 09:28

## 2017-12-08 RX ADMIN — HEPARIN SODIUM 5000 UNITS: 5000 INJECTION, SOLUTION INTRAVENOUS; SUBCUTANEOUS at 15:30

## 2017-12-08 RX ADMIN — TIZANIDINE 8 MG: 4 TABLET ORAL at 21:20

## 2017-12-08 RX ADMIN — OXYCODONE HYDROCHLORIDE 20 MG: 10 TABLET ORAL at 13:22

## 2017-12-08 RX ADMIN — LIDOCAINE 1 PATCH: 50 PATCH TOPICAL at 05:42

## 2017-12-08 RX ADMIN — HEPARIN SODIUM 2000 UNITS: 1000 INJECTION, SOLUTION INTRAVENOUS; SUBCUTANEOUS at 11:50

## 2017-12-08 RX ADMIN — Medication 667 MG: at 13:21

## 2017-12-08 RX ADMIN — HYDROXYCHLOROQUINE SULFATE 200 MG: 200 TABLET ORAL at 21:13

## 2017-12-08 RX ADMIN — TRAZODONE HYDROCHLORIDE 50 MG: 50 TABLET ORAL at 21:12

## 2017-12-08 RX ADMIN — PROMETHAZINE HYDROCHLORIDE 25 MG: 25 TABLET ORAL at 05:03

## 2017-12-08 RX ADMIN — Medication 667 MG: at 05:03

## 2017-12-08 RX ADMIN — HEPARIN SODIUM 5000 UNITS: 5000 INJECTION, SOLUTION INTRAVENOUS; SUBCUTANEOUS at 05:03

## 2017-12-08 RX ADMIN — OXYCODONE HYDROCHLORIDE 20 MG: 20 TABLET, FILM COATED, EXTENDED RELEASE ORAL at 09:28

## 2017-12-08 RX ADMIN — Medication 667 MG: at 18:04

## 2017-12-08 RX ADMIN — OXYCODONE HYDROCHLORIDE 20 MG: 10 TABLET ORAL at 19:04

## 2017-12-08 RX ADMIN — OXYCODONE HYDROCHLORIDE AND ACETAMINOPHEN 500 MG: 500 TABLET ORAL at 09:28

## 2017-12-08 ASSESSMENT — ENCOUNTER SYMPTOMS
DOUBLE VISION: 0
FEVER: 0
LOSS OF CONSCIOUSNESS: 0
DIZZINESS: 0
FALLS: 0
POLYDIPSIA: 0
BRUISES/BLEEDS EASILY: 0
HEARTBURN: 0
ABDOMINAL PAIN: 0
SORE THROAT: 0
SEIZURES: 0
FLANK PAIN: 1
NECK PAIN: 1
NAUSEA: 0
COUGH: 0
CHILLS: 0
DEPRESSION: 0
SINUS PAIN: 0
BLURRED VISION: 0
BACK PAIN: 1
SHORTNESS OF BREATH: 0
HEADACHES: 0
FOCAL WEAKNESS: 0
MYALGIAS: 0
WEAKNESS: 1
PALPITATIONS: 0
VOMITING: 0

## 2017-12-08 ASSESSMENT — PAIN SCALES - GENERAL
PAINLEVEL_OUTOF10: 8
PAINLEVEL_OUTOF10: 8
PAINLEVEL_OUTOF10: 10
PAINLEVEL_OUTOF10: 8
PAINLEVEL_OUTOF10: 8
PAINLEVEL_OUTOF10: 0
PAINLEVEL_OUTOF10: 8
PAINLEVEL_OUTOF10: 8

## 2017-12-08 NOTE — DISCHARGE PLANNING
Outpatient Dialysis Note    Confirmed patient is active at:    [facility]  [address] [place] [address]   [place] [address]    Schedule: Monday, Wednesday, Friday  Time: 3:00 pm    Spoke with Rambo at facility who confirmed.    Forwarded records for review.    Dialysis Coordinator, [facility]  Kelli Anderson [phone]

## 2017-12-08 NOTE — PROGRESS NOTES
HD treatment today using newly placed RIJ temp CVC.Treatment terminated 7 mins early d/t symptomatic HOTN,200 ml NS bolus given with good result.Net UF removed 2300 ml.Report given to primary Rn.

## 2017-12-08 NOTE — PROGRESS NOTES
Spoke to MD with DANIELLE robin about how patient is not on lidocaine patch as it is listed in her home meds. MD to restart patch and day shift team will address the patient's pain management.

## 2017-12-08 NOTE — PROGRESS NOTES
Internal Medicine Interval Note  Note Author: Anastacio Allan M.D.     Name Debby Carrizales 1980   Age/Sex 37 y.o. female   MRN [id number]   Code Status Full      After 5PM or if no immediate response to page, please call for cross-coverage  Attending/Team: Cindy/aMdelyn See Patient List for primary contact information  Call [phone] to page    1st Call - Day Intern (R1):   Verona  2nd Call - Day Sr. Resident (R2/R3):   Ivonne         Reason for interval visit  (Principal Problem)   AV fistula occlusion    Interval Problem Daily Status Update  (24 hours)   Patient was evaluated at bedside and found AAOX3, afebrile and in NAD. Patient with stable vitals. No leukocytosis or signs of infection. As per nephrology, Pt had HD today and tolerated well. Surgery on board, Pt for AVG thombosis tomorrow. Adjusted pain MGMT. Labs on AM.       Review of Systems   Constitutional: Positive for malaise/fatigue. Negative for chills and fever.   HENT: Negative for hearing loss, sinus pain, sore throat and tinnitus.    Eyes: Negative for blurred vision and double vision.   Respiratory: Negative for cough and shortness of breath.    Cardiovascular: Negative for chest pain and palpitations.   Gastrointestinal: Negative for abdominal pain, heartburn, nausea and vomiting.   Genitourinary: Positive for flank pain. Negative for dysuria, frequency and urgency.   Musculoskeletal: Positive for back pain, joint pain and neck pain. Negative for falls and myalgias.   Skin: Negative for itching and rash.   Neurological: Positive for weakness. Negative for dizziness, focal weakness, seizures, loss of consciousness and headaches.   Endo/Heme/Allergies: Negative for polydipsia. Does not bruise/bleed easily.   Psychiatric/Behavioral: Negative for depression and suicidal ideas.       Consultants/Specialty  Nephrology (Dr. Gotti)  Surgery (Dr. Jansen)    Disposition  Inpatient    Quality Measures    Reviewed  items::  EKG reviewed, Labs reviewed, Medications reviewed and Radiology images reviewed  Taylor catheter::  No Taylor  DVT prophylaxis pharmacological::  Heparin  DVT prophylaxis - mechanical:  Not indicated at this time, ambulatory  Ulcer Prophylaxis::  Not indicated          Physical Exam       Vitals:    12/07/17 1535 12/07/17 2000 12/08/17 0415 12/08/17 0725   BP:  (!) 96/69 (!) 91/60 100/75   Pulse: 66 81 67 63   Resp: 16 16 16 16   Temp:  37.2 °C (98.9 °F) 36.8 °C (98.2 °F) 37 °C (98.6 °F)   SpO2: 97% 96% 94% 96%   Weight:       Height:         Body mass index is 30.08 kg/m².    Oxygen Therapy:  Pulse Oximetry: 96 %, O2 (LPM): 0, O2 Delivery: None (Room Air)    Physical Exam   Constitutional: She is oriented to person, place, and time and well-developed, well-nourished, and in no distress. No distress.   HENT:   Head: Normocephalic and atraumatic.   Eyes: Conjunctivae and EOM are normal. Pupils are equal, round, and reactive to light. Right eye exhibits no discharge. Left eye exhibits no discharge. No scleral icterus.   Neck: Normal range of motion. Neck supple. No JVD present.   Cardiovascular: Normal rate and regular rhythm.  Exam reveals no gallop and no friction rub.    No murmur heard.  Pulmonary/Chest: Effort normal and breath sounds normal. No respiratory distress.   Abdominal: Soft. Bowel sounds are normal. She exhibits no distension. There is no tenderness.   Musculoskeletal: Normal range of motion. She exhibits tenderness. She exhibits no edema.   AV fistula to the right ARM  No erythema, or edema  Area warm to touch   Lymphadenopathy:     She has no cervical adenopathy.   Neurological: She is alert and oriented to person, place, and time. No cranial nerve deficit. GCS score is 15.   Skin: Skin is warm and dry. No rash noted. She is not diaphoretic. No erythema. No pallor.   Psychiatric: Mood, memory, affect and judgment normal.       Lab Data Review:     12/7/2017  3:00 PM    Recent Labs       12/07/17 0140 12/08/17 0440   SODIUM  135  137   POTASSIUM  4.0  5.1   CHLORIDE  96  102   CO2  23  23   BUN  80*  50*   CREATININE  8.49*  6.50*   MAGNESIUM   --   2.7*   PHOSPHORUS   --   7.9*   CALCIUM  8.6  9.5       Recent Labs      12/07/17 0140 12/08/17 0440   ALTSGPT  7  10   ASTSGOT  9*  11*   ALKPHOSPHAT  87  91   TBILIRUBIN  0.4  0.4   GLUCOSE  89  105*       Recent Labs      12/07/17 0140 12/08/17 0440   RBC  3.99*  4.21   HEMOGLOBIN  12.7  12.9   HEMATOCRIT  38.1  41.3   PLATELETCT  130*  130*   PROTHROMBTM  13.0   --    APTT  61.2*   --    INR  1.01   --        Recent Labs      12/07/17 0140 12/08/17 0440   WBC  6.5  4.5*   NEUTSPOLYS  51.20  52.40   LYMPHOCYTES  31.90  29.10   MONOCYTES  10.20  11.40   EOSINOPHILS  4.90  4.50   BASOPHILS  0.90  1.30   ASTSGOT  9*  11*   ALTSGPT  7  10   ALKPHOSPHAT  87  91   TBILIRUBIN  0.4  0.4           Assessment/Plan     AV fistula occlusion (CMS-HCC)   Assessment & Plan    - No thrill appreciated on fish auscultation  - AV fistula site warm   - Patient has temporary HD  - Surgery on board, patient scheduled for catheter AVG [facility] tomorrow  - Labs on AM.         ESRD (end stage renal disease) on dialysis (CMS-HCC)- (present on admission)   Assessment & Plan    - Patient on HD MWF  - Patient missed one session of hemodialysis  - No gross abnormalities with [facility]  - Nephrology on board, Will follow recommendations  - Labs on AM        Chronic lupus nephritis (CMS-HCC)- (present on admission)   Assessment & Plan    - Per pt rheum has some concern about possibility of flare, no notes suggesting change of mgmt however  - Continue home hydroxychloroquine         UTI (urinary tract infection)- (present on admission)   Assessment & Plan    - Complains of dysuria, hematuria, and green urine; mentions fever and N/V  - PMH of ESBL pyelo in 2015, most recent UTI in 2/6/17 pansensitive E coli however  - UA suggestive of infection  - Continue C3          Hypertension- (present on admission)   Assessment & Plan    - Continue home amlodipine        Avascular necrosis of bones of both hips (HCC)- (present on admission)   Assessment & Plan    - Continue home pain regimen         Nausea & vomiting- (present on admission)   Assessment & Plan    - Last vomited morning prior to arrival, chronic, reoccurring since admission in November  - Possibly 2/2 UTI  - Continue Zofran and phenergan PRN          Depression- (present on admission)   Assessment & Plan    - Continue home bupropion         Drug-seeking behavior- (present on admission)   Assessment & Plan    - Patient with history of drug-seeking behavior  - Limited IV narcotic use

## 2017-12-08 NOTE — CARE PLAN
Problem: Infection  Goal: Will remain free from infection    Intervention: Assess for removal of potential routes of infection, such as IV, central line, intra-arterial or urinary catheters  Goal met. Patient still needs all iv accesses.       Problem: Pain Management  Goal: Pain level will decrease to patient's comfort goal    Intervention: Follow pain managment plan developed in collaboration with patient and Interdisciplinary Team  Pain not controlled. Goal not met.

## 2017-12-08 NOTE — PROGRESS NOTES
Spoke with Dr. Rhoades regarding pt frustrations with pain medications. New orders placed. Call placed to dialysis to update pt.

## 2017-12-08 NOTE — PROGRESS NOTES
Pain medication, phenergan, and lyrica brought to pt in dialysis per pt request. Pt agitated that her pain medication is not the same as she takes at home. Educated on pain medication given today. Pt remains emotional. Emotional support provided. Phone and tablet brought to pt in dialysis. [employer] white paged.

## 2017-12-08 NOTE — CONSULTS
"Chief Complaint   Patient presents with   • Vascular Access Problem   • Fever   • Diarrhea     Reason for consult: ESRD, thrombsed AVF  Requesting provider: Dr. Dalton    HPI: 37 year old with ESRD from LN who has a history of LBP, fibromyalgia, recurrent UTIs, and ESRD who has had recurrent AVF thrombosis and here for dialysis and dialysis.  Feeling overall about the same. Feeling down about the thrombosis.     Past Medical History:   Diagnosis Date   • Arthritis     all joints,r/t lupus   • Avascular necrosis of bones of both hips (HCC) 10/10/2016   • Clostridium difficile colitis 5/3/2011   • Dialysis patient    • ESBL (extended spectrum beta-lactamase) producing bacteria infection 8/25/2014   • Fibromyalgia    • Hypertension    • Lupus    • Pneumonia    • Psychiatric disorder     anxiety, depression   • Renal failure        Social History   Substance Use Topics   • Smoking status: Former Smoker     Packs/day: 0.50     Years: 18.00     Types: Cigarettes     Quit date: 6/13/2011   • Smokeless tobacco: Never Used      Comment: 1/2 ppd   • Alcohol use No      Comment: occ       History reviewed. No pertinent family history.    Allergies   Allergen Reactions   • Ativan Anxiety     Patient becomes severely paranoid and agitated   • Morphine Itching     Tolerates Dilaudid   • Seasonal Runny Nose and Itching     Hay fever, sabiha brush       Review of Systems   Constitutional: Negative for chills and fever.   Cardiovascular: Negative for chest pain and palpitations.       Encounter Vitals  Standard Vitals  Vitals  Blood Pressure: (!) 96/69 (Rn Notified)  Temperature: 37.2 °C (98.9 °F)  Pulse: 81  Respiration: 16  Pulse Oximetry: 96 %  Height: 165.1 cm (5' 5\")  Weight: 82 kg (180 lb 12.4 oz)  Encounter Vitals  Temperature: 37.2 °C (98.9 °F)  Temp Source: Temporal  Blood Pressure: (!) 96/69 (Rn Notified)  BP Location: Left, Upper Arm  Patient BP Position: Sitting  Pulse: 81  Respiration: 16  Pulse Oximetry: 96 %  O2 " "(LPM): 0  O2 Delivery: None (Room Air)  Weight: 82 kg (180 lb 12.4 oz)  How Weight Obtained: Bed Scale  Height: 165.1 cm (5' 5\")  BMI (Calculated): 30.08  Location: Back, Hip  Description: Aching, Constant  Breastfeeding Status: No  Pulmonary-Specific Vitals     Durable Medical Equipment-Specific Vitals  DME  O2 (LPM): 0  O2 Delivery: None (Room Air)          Physical Exam   Constitutional: She is oriented to person, place, and time and well-developed, well-nourished, and in no distress. No distress.   HENT:   Head: Normocephalic and atraumatic.   Cardiovascular: Normal rate and regular rhythm.    Pulmonary/Chest: Effort normal and breath sounds normal.   Abdominal: Soft. Bowel sounds are normal.   Musculoskeletal: She exhibits no edema or deformity.   Neurological: She is alert and oriented to person, place, and time.   Skin: Skin is warm and dry. She is not diaphoretic.   Psychiatric: Affect and judgment normal.       LABS:  Recent Labs      12/07/17   0140   WBC  6.5   RBC  3.99*   HEMOGLOBIN  12.7   HEMATOCRIT  38.1   MCV  95.5   MCH  31.8   RDW  45.3   PLATELETCT  130*   MPV  9.8   NEUTSPOLYS  51.20   LYMPHOCYTES  31.90   MONOCYTES  10.20   EOSINOPHILS  4.90   BASOPHILS  0.90     Recent Labs      12/07/17   0140   SODIUM  135   POTASSIUM  4.0   CHLORIDE  96   CO2  23   GLUCOSE  89   BUN  80*       STUDIES:    Assessment:  1. ESRD, HD after access placement  2. Thrombosed AVF, recurrent, vascular surgery consulted    -HD after access placement  -Vascular surgery to determine ultimate tx for AVF    Plan:  "

## 2017-12-08 NOTE — CONSULTS
Consult vascular Note:    Lesly Jansen  Date & Time note created:    12/8/2017   4:47 AM     Referred by: Haylie Sandoval MD    Patient ID:   Name:             Debby Carrizales   YOB: 1980  Age:                 37 y.o.  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  Dr. Ardon recently performed percutaneous thrombectomy of the graft, which appeared to be adequate for treatment, but within 2 weeks it has thrombosed.  Debby had a temporary catheter placed earlier today to allow dialysis.  Her renal failure is due to lupus nephritis.    Review of Systems:      Constitutional: Denies fevers, denies weight changes  Eyes: Denies changes in vision, no eye pain  Ears/Nose/Throat/Mouth: Denies nasal congestion or sore throat   Cardiovascular: Denies chest pain or  palpitations   Respiratory: Denies shortness of breath , Denies cough  Gastrointestinal/Hepatic: Denies abdominal pain, nausea, vomiting, diarrhea, constipation or GI bleeding   Genitourinary: Denies dysuria or frequency  Musculoskeletal/Rheum: Denies  joint pain and swelling, mild edema, no pain with walking  Skin: no rash, no history of open wounds  Neurological: Denies headache, confusion, memory loss or focal weakness/parasthesias  Psychiatric: Denies mood disorder   Endocrine: Denies thyroid problems  Heme/Oncology/Lymph Nodes: Denies enlarged lymph nodes, denies brusing or known bleeding disorder  All other systems were reviewed and are negative (AMA/CMS criteria)                Past Medical History:   Past Medical History:   Diagnosis Date   • Arthritis     all joints,r/t lupus   • Avascular necrosis of bones of both hips (HCC) 10/10/2016   • Clostridium difficile colitis 5/3/2011   •  Dialysis patient    • ESBL (extended spectrum beta-lactamase) producing bacteria infection 8/25/2014   • Fibromyalgia    • Hypertension    • Lupus    • Pneumonia    • Psychiatric disorder     anxiety, depression   • Renal failure        Past Surgical History:  Past Surgical History:   Procedure Laterality Date   • THROMBECTOMY Right 8/21/2016    Procedure: THROMBECTOMY - right AV fistula graft with grams;  Surgeon: Shabbir Ardon M.D.;  Location: SURGERY [facility];  Service:    • ANGIOPLASTY BALLOON  8/21/2016    Procedure: ANGIOPLASTY BALLOON;  Surgeon: Shabbir Ardon M.D.;  Location: SURGERY [facility];  Service:    • THROMBECTOMY Right 8/20/2016    Procedure: THROMBECTOMY AV GRAFT;  Surgeon: Shabbir Ardon M.D.;  Location: [facility];  Service:    • AV FISTULA CREATION Right 7/12/2016    Procedure: AV FISTULA CREATION WITH GRAFT BRACHIAL AXILLARY;  Surgeon: Shabbir Ardon M.D.;  Location: SURGERY [facility];  Service:    • CLOSED REDUCTION Right 7/5/2016    Procedure: CLOSED REDUCTION- Hip ;  Surgeon: Michael Holman M.D.;  Location: [facility];  Service:    • HIP ARTHROPLASTY TOTAL Right 1/18/2016    Procedure: HIP ARTHROPLASTY TOTAL;  Surgeon: Michael Holman M.D.;  Location: [facility];  Service:    • AV FISTULA CREATION  2/3/2015    Performed by Shabbir Ardon M.D. at [facility]   • AV FISTULA CREATION  11/14/2014    Performed by Shabbir Ardon M.D. at SURGERY [facility]   • AV FISTULA CREATION  9/9/2014    Performed by Shabbir Ardon M.D. at [facility]   • CATH PLACEMENT  9/9/2014    Performed by Shabbir Ardon M.D. at [facility]   • OTHER  5/2011    tracheostomy   • GASTROSCOPY-ENDO  4/27/2011    Performed by PALMIRA DOAN at ENDOSCOPY [facility]   • COLONOSCOPY WITH BIOPSY  4/20/2011    Performed by ALCIRA CRUZ at ENDOSCOPY [facility]   • GASTROSCOPY-ENDO   4/18/2011    Performed by ALCIRA CRUZ at ENDOSCOPY [facility]   • GASTROSCOPY-ENDO  4/10/2011    Performed by SYED JURADO at ENDOSCOPY [facility]   • SCLEROTHERAPHY  4/10/2011    Performed by SYED JURADO at ENDOSCOPY [facility]   • OTHER ABDOMINAL SURGERY      kidney biopsy   • TRACHEOSTOMY         Current Outpatient Medications:  Current Facility-Administered Medications   Medication Dose Route Frequency Provider Last Rate Last Dose   • pneumococcal 13-Faustina Conj Vacc (PREVNAR 13) syringe 0.5 mL  0.5 mL Intramuscular Once Michaela White M.D.       • senna-docusate (PERICOLACE or SENOKOT S) 8.6-50 MG per tablet 2 Tab  2 Tab Oral BID Tima Tejeda M.D.        And   • polyethylene glycol/lytes (MIRALAX) PACKET 1 Packet  1 Packet Oral QDAY PRN Tima Tejeda M.D.        And   • magnesium hydroxide (MILK OF MAGNESIA) suspension 30 mL  30 mL Oral QDAY PRN Tima Tejeda M.D.        And   • bisacodyl (DULCOLAX) suppository 10 mg  10 mg Rectal QDAY PRN Tima Tejeda M.D.       • cefTRIAXone (ROCEPHIN) syringe 2 g  2 g Intravenous Q24HRS Tima Tejeda M.D.       • heparin injection 5,000 Units  5,000 Units Subcutaneous Q8HRS Tima Tejeda M.D.   5,000 Units at 12/07/17 2043   • labetalol (NORMODYNE,TRANDATE) injection 10 mg  10 mg Intravenous Q4HRS PRN Tima Tejeda M.D.       • acetaminophen (TYLENOL) tablet 650 mg  650 mg Oral Q6HRS PRN Tima Tejeda M.D.       • amlodipine (NORVASC) tablet 5 mg  5 mg Oral QDAY PRN Tima Tejeda M.D.       • ascorbic acid (ascorbic acid) tablet 500 mg  500 mg Oral DAILY Tima Tejeda M.D.   500 mg at 12/07/17 0804   • buPROPion (WELLBUTRIN) tablet 100 mg  100 mg Oral DAILY Tima Tejeda M.D.   100 mg at 12/07/17 0912   • cholecalciferol (VITAMIN D3) tablet 400 Units  400 Units Oral DAILY Tima Tejeda M.D.   400 Units at 12/07/17 0803   • calcium acetate (PHOS-LO) 667 MG  tablet 667 mg  667 mg Oral TID AC Tima Tejeda M.D.   667 mg at 12/07/17 2043   • ferrous sulfate tablet 325 mg  325 mg Oral DAILY Tima Tejeda M.D.   325 mg at 12/07/17 0803   • hydroxychloroquine (PLAQUENIL) tablet 200 mg  200 mg Oral QHS Tima Tejeda M.D.   200 mg at 12/07/17 2043   • pregabalin (LYRICA) capsule 150 mg  150 mg Oral TID Tima Tejeda M.D.   150 mg at 12/07/17 2044   • trazodone (DESYREL) tablet 50 mg  50 mg Oral QHS Tima Tejeda M.D.   50 mg at 12/07/17 2043   • tizanidine (ZANAFLEX) tablet 8 mg  8 mg Oral QHS Tima Tejeda M.D.   8 mg at 12/07/17 2043   • ondansetron (ZOFRAN) syringe/vial injection 4 mg  4 mg Intravenous Q4HRS PRN Tima Tejeda M.D.       • promethazine (PHENERGAN) tablet 25 mg  25 mg Oral Q4HRS PRN Tima Tejeda M.D.   25 mg at 12/07/17 1557   • lidocaine (XYLOCAINE) 1 % solution  1 mL Intradermal PRN Spike Gotti M.D.       • heparin injection 2,000 Units  2,000 Units Intravenous DIALYSIS PRN Spike Gotti M.D.       • heparin injection 2,400 Units  2,400 Units Intracatheter PRN Spike Gotti M.D.       • oxyCODONE CR (OXYCONTIN) tablet 20 mg  20 mg Oral Q12HRS Anastacio Allan M.D.   20 mg at 12/07/17 2044       Medication Allergy:  Allergies   Allergen Reactions   • Ativan Anxiety     Patient becomes severely paranoid and agitated   • Morphine Itching     Tolerates Dilaudid   • Seasonal Runny Nose and Itching     Hay fever, sabiha brush       Family History:  History reviewed. No pertinent family history.    Social History:  Social History     Social History   • Marital status: Single     Spouse name: N/A   • Number of children: N/A   • Years of education: N/A     Occupational History   • Not on file.     Social History Main Topics   • Smoking status: Former Smoker     Packs/day: 0.50     Years: 18.00     Types: Cigarettes     Quit date: 6/13/2011   • Smokeless tobacco: Never Used      Comment: 1/2 ppd  "  • Alcohol use No      Comment: occ   • Drug use: No   • Sexual activity: Not on file     Other Topics Concern   • Not on file     Social History Narrative   • No narrative on file         Physical Exam:  Vitals/ General Appearance:   Weight/BMI: Body mass index is 30.08 kg/m².  Blood pressure (!) 91/60, pulse 67, temperature 36.8 °C (98.2 °F), resp. rate 16, height 1.651 m (5' 5\"), weight 82 kg (180 lb 12.4 oz), SpO2 94 %, not currently breastfeeding.  Vitals:    12/07/17 1510 12/07/17 1535 12/07/17 2000 12/08/17 0415   BP:   (!) 96/69 (!) 91/60   Pulse: 70 66 81 67   Resp: 16 16 16 16   Temp:   37.2 °C (98.9 °F) 36.8 °C (98.2 °F)   SpO2: 98% 97% 96% 94%   Weight:       Height:         Oxygen Therapy:  Pulse Oximetry: 94 %, O2 (LPM): 0, O2 Delivery: None (Room Air)    Constitutional:   Well developed, No acute distress  HENMT:  Normocephalic, Atraumatic, Oropharynx moist mucous membranes, No oral exudates, Nose normal.  No thyromegaly.  Eyes:  EOMI, Conjunctiva normal, No discharge.  Neck:  Normal range of motion, No cervical tenderness,  no JVD, right IJ dialysis catheter  Cardiovascular:  Normal heart rate, Normal rhythm, No murmur.   Extremities with intact distal pulses, no cyanosis, or edema.  Lungs:  Breath sounds clear to auscultation bilaterally,  no crackles, no wheezing.   Abdomen: Bowel sounds normal, Soft, No tenderness, No guarding, No rebound, No masses.   Skin: Warm, Dry, No erythema, No rash, no induration.  Neurologic: Alert & oriented x 3, No focal deficits noted, cranial nerves II through X are grossly intact.  Psychiatric: Affect normal, Judgment normal, Mood normal.  Extremities:  Thrombosed right upper extremity arteriovenous graft.      Lab Data Review:  Recent Results (from the past 24 hour(s))   HEP B SURFACE ANTIGEN    Collection Time: 12/07/17  4:40 PM   Result Value Ref Range    Hepatitis B Surface Antigen Negative Negative         MDM (Assessment and Plan):     Patient Active Problem " List    Diagnosis Date Noted   • AV fistula occlusion (CMS-HCC) 12/07/2017     Priority: High   • Pyelonephritis 11/21/2017     Priority: High   • A-V fistula (CMS-HCC) 04/21/2017     Priority: High   • Medical non-compliance 01/04/2017     Priority: High   • ESRD (end stage renal disease) on dialysis (CMS-HCC) 10/10/2016     Priority: High   • Chronic lupus nephritis (CMS-HCC) 12/01/2013     Priority: Medium   • DAVI (obstructive sleep apnea) 07/18/2011     Priority: Medium   • Nausea & vomiting 11/21/2017     Priority: Low   • Depression 07/25/2017     Priority: Low   • Anemia 07/19/2017     Priority: Low   • Low back pain 07/19/2017     Priority: Low   • Drug-seeking behavior 08/02/2015     Priority: Low   • Thrombocytopenia (CMS-HCC) 01/19/2015     Priority: Low   • Narcotic dependence (CMS-HCC) 12/05/2013     Priority: Low   • Fibromyalgia 02/10/2012     Priority: Low   • Anxiety 02/10/2012     Priority: Low   • Avascular necrosis (CMS-HCC) 11/21/2017   • Elevated troponin 11/21/2017   • UTI (urinary tract infection) 02/07/2017   • Hypertension 02/06/2017   • Avascular necrosis of bones of both hips ([facility]) 10/10/2016   • Vitamin D deficiency disease 12/11/2013     Impression:  Debby will need an operative approach to her right arm arteriovenous graft with venous patch angioplasty to achieve patency and allow dialysis through this access.  She is obviously disappointed and her dialysis options appear limited.  We may need to consider a thigh access.    I spent a total of 20 minutes face-to-face time during this clinical encounter of which > 50% was devoted to counseling and coordinating care including review of records, pertinent lab data and studies, as well as discussing diagnostic evaluation and work up, planned therapeutic interventions and future disposition of care.     Lesly Jansen M.D.

## 2017-12-08 NOTE — PROGRESS NOTES
Pt is stable in room and complains of pain at the IJ site. Pt is alert and oriented x4 and fatigued. Pt is able to eubanks & fc. Pt has tingling of B hands and feet. PERRLA. Pt has a RIJ & and L upper chest port. Bed alarm is not on but  Call light is within reach. Pt was educated on pain and fall risk precautions.

## 2017-12-09 ENCOUNTER — APPOINTMENT (OUTPATIENT)
Dept: RADIOLOGY | Facility: MEDICAL CENTER | Age: 37
DRG: 252 | End: 2017-12-09
Attending: SURGERY
Payer: MEDICARE

## 2017-12-09 LAB
ALBUMIN SERPL BCP-MCNC: 3.9 G/DL (ref 3.2–4.9)
ALBUMIN/GLOB SERPL: 1.3 G/DL
ALP SERPL-CCNC: 89 U/L (ref 30–99)
ALT SERPL-CCNC: 9 U/L (ref 2–50)
ANION GAP SERPL CALC-SCNC: 13 MMOL/L (ref 0–11.9)
AST SERPL-CCNC: 9 U/L (ref 12–45)
BACTERIA UR CULT: ABNORMAL
BILIRUB SERPL-MCNC: 0.4 MG/DL (ref 0.1–1.5)
BUN SERPL-MCNC: 55 MG/DL (ref 8–22)
CALCIUM SERPL-MCNC: 9.3 MG/DL (ref 8.5–10.5)
CHLORIDE SERPL-SCNC: 99 MMOL/L (ref 96–112)
CO2 SERPL-SCNC: 23 MMOL/L (ref 20–33)
CREAT SERPL-MCNC: 7.18 MG/DL (ref 0.5–1.4)
GFR SERPL CREATININE-BSD FRML MDRD: 6 ML/MIN/1.73 M 2
GLOBULIN SER CALC-MCNC: 2.9 G/DL (ref 1.9–3.5)
GLUCOSE SERPL-MCNC: 81 MG/DL (ref 65–99)
MAGNESIUM SERPL-MCNC: 2.7 MG/DL (ref 1.5–2.5)
PHOSPHATE SERPL-MCNC: 8.3 MG/DL (ref 2.5–4.5)
POTASSIUM SERPL-SCNC: 5.2 MMOL/L (ref 3.6–5.5)
PROT SERPL-MCNC: 6.8 G/DL (ref 6–8.2)
SIGNIFICANT IND 70042: ABNORMAL
SITE SITE: ABNORMAL
SODIUM SERPL-SCNC: 135 MMOL/L (ref 135–145)
SOURCE SOURCE: ABNORMAL

## 2017-12-09 PROCEDURE — 500698 HCHG HEMOCLIP, MEDIUM: Performed by: SURGERY

## 2017-12-09 PROCEDURE — 770021 HCHG ROOM/CARE - ISO PRIVATE

## 2017-12-09 PROCEDURE — A9270 NON-COVERED ITEM OR SERVICE: HCPCS

## 2017-12-09 PROCEDURE — 160048 HCHG OR STATISTICAL LEVEL 1-5: Performed by: SURGERY

## 2017-12-09 PROCEDURE — 700102 HCHG RX REV CODE 250 W/ 637 OVERRIDE(OP): Performed by: STUDENT IN AN ORGANIZED HEALTH CARE EDUCATION/TRAINING PROGRAM

## 2017-12-09 PROCEDURE — 700111 HCHG RX REV CODE 636 W/ 250 OVERRIDE (IP)

## 2017-12-09 PROCEDURE — 700101 HCHG RX REV CODE 250

## 2017-12-09 PROCEDURE — 500700 HCHG HEMOCLIP, SMALL (RED): Performed by: SURGERY

## 2017-12-09 PROCEDURE — 160036 HCHG PACU - EA ADDL 30 MINS PHASE I: Performed by: SURGERY

## 2017-12-09 PROCEDURE — 160029 HCHG SURGERY MINUTES - 1ST 30 MINS LEVEL 4: Performed by: SURGERY

## 2017-12-09 PROCEDURE — 83735 ASSAY OF MAGNESIUM: CPT

## 2017-12-09 PROCEDURE — 84100 ASSAY OF PHOSPHORUS: CPT

## 2017-12-09 PROCEDURE — C1768 GRAFT, VASCULAR: HCPCS | Performed by: SURGERY

## 2017-12-09 PROCEDURE — A6402 STERILE GAUZE <= 16 SQ IN: HCPCS | Performed by: SURGERY

## 2017-12-09 PROCEDURE — 700102 HCHG RX REV CODE 250 W/ 637 OVERRIDE(OP)

## 2017-12-09 PROCEDURE — 700111 HCHG RX REV CODE 636 W/ 250 OVERRIDE (IP): Performed by: INTERNAL MEDICINE

## 2017-12-09 PROCEDURE — A9270 NON-COVERED ITEM OR SERVICE: HCPCS | Performed by: STUDENT IN AN ORGANIZED HEALTH CARE EDUCATION/TRAINING PROGRAM

## 2017-12-09 PROCEDURE — 160009 HCHG ANES TIME/MIN: Performed by: SURGERY

## 2017-12-09 PROCEDURE — 80053 COMPREHEN METABOLIC PANEL: CPT

## 2017-12-09 PROCEDURE — 99232 SBSQ HOSP IP/OBS MODERATE 35: CPT | Mod: GC | Performed by: HOSPITALIST

## 2017-12-09 PROCEDURE — C1757 CATH, THROMBECTOMY/EMBOLECT: HCPCS | Performed by: SURGERY

## 2017-12-09 PROCEDURE — 700101 HCHG RX REV CODE 250: Performed by: INTERNAL MEDICINE

## 2017-12-09 PROCEDURE — 160035 HCHG PACU - 1ST 60 MINS PHASE I: Performed by: SURGERY

## 2017-12-09 PROCEDURE — 160002 HCHG RECOVERY MINUTES (STAT): Performed by: SURGERY

## 2017-12-09 PROCEDURE — 05UD0JZ SUPPLEMENT RIGHT CEPHALIC VEIN WITH SYNTHETIC SUBSTITUTE, OPEN APPROACH: ICD-10-PCS | Performed by: SURGERY

## 2017-12-09 PROCEDURE — 700111 HCHG RX REV CODE 636 W/ 250 OVERRIDE (IP): Performed by: HOSPITALIST

## 2017-12-09 PROCEDURE — 302085 SET,INFUSION NEEDLE 22 X 3/4": Performed by: HOSPITALIST

## 2017-12-09 PROCEDURE — 160041 HCHG SURGERY MINUTES - EA ADDL 1 MIN LEVEL 4: Performed by: SURGERY

## 2017-12-09 PROCEDURE — 03C70ZZ EXTIRPATION OF MATTER FROM RIGHT BRACHIAL ARTERY, OPEN APPROACH: ICD-10-PCS | Performed by: SURGERY

## 2017-12-09 PROCEDURE — 501838 HCHG SUTURE GENERAL: Performed by: SURGERY

## 2017-12-09 PROCEDURE — 501837 HCHG SUTURE CV: Performed by: SURGERY

## 2017-12-09 PROCEDURE — 700111 HCHG RX REV CODE 636 W/ 250 OVERRIDE (IP): Performed by: STUDENT IN AN ORGANIZED HEALTH CARE EDUCATION/TRAINING PROGRAM

## 2017-12-09 PROCEDURE — C2628 CATHETER, OCCLUSION: HCPCS | Performed by: SURGERY

## 2017-12-09 PROCEDURE — 700101 HCHG RX REV CODE 250: Performed by: STUDENT IN AN ORGANIZED HEALTH CARE EDUCATION/TRAINING PROGRAM

## 2017-12-09 DEVICE — IMPLANTABLE DEVICE: Type: IMPLANTABLE DEVICE | Site: ARM | Status: FUNCTIONAL

## 2017-12-09 RX ORDER — MEPERIDINE HYDROCHLORIDE 25 MG/ML
INJECTION INTRAMUSCULAR; INTRAVENOUS; SUBCUTANEOUS
Status: COMPLETED
Start: 2017-12-09 | End: 2017-12-09

## 2017-12-09 RX ORDER — HEPARIN SODIUM,PORCINE 1000/ML
VIAL (ML) INJECTION
Status: DISCONTINUED | OUTPATIENT
Start: 2017-12-09 | End: 2017-12-09 | Stop reason: HOSPADM

## 2017-12-09 RX ORDER — HYDROMORPHONE HYDROCHLORIDE 2 MG/ML
INJECTION, SOLUTION INTRAMUSCULAR; INTRAVENOUS; SUBCUTANEOUS
Status: COMPLETED
Start: 2017-12-09 | End: 2017-12-09

## 2017-12-09 RX ORDER — OXYCODONE HCL 5 MG/5 ML
SOLUTION, ORAL ORAL
Status: COMPLETED
Start: 2017-12-09 | End: 2017-12-09

## 2017-12-09 RX ORDER — OXYCODONE HYDROCHLORIDE 5 MG/1
TABLET ORAL
Status: COMPLETED
Start: 2017-12-09 | End: 2017-12-09

## 2017-12-09 RX ORDER — HYDROMORPHONE HYDROCHLORIDE 2 MG/ML
0.5 INJECTION, SOLUTION INTRAMUSCULAR; INTRAVENOUS; SUBCUTANEOUS ONCE
Status: COMPLETED | OUTPATIENT
Start: 2017-12-09 | End: 2017-12-09

## 2017-12-09 RX ORDER — MIDAZOLAM HYDROCHLORIDE 1 MG/ML
INJECTION INTRAMUSCULAR; INTRAVENOUS
Status: COMPLETED
Start: 2017-12-09 | End: 2017-12-09

## 2017-12-09 RX ORDER — BUPIVACAINE HYDROCHLORIDE AND EPINEPHRINE 5; 5 MG/ML; UG/ML
INJECTION, SOLUTION EPIDURAL; INTRACAUDAL; PERINEURAL
Status: DISCONTINUED | OUTPATIENT
Start: 2017-12-09 | End: 2017-12-09 | Stop reason: HOSPADM

## 2017-12-09 RX ORDER — HYDROMORPHONE HYDROCHLORIDE 2 MG/ML
0.5 INJECTION, SOLUTION INTRAMUSCULAR; INTRAVENOUS; SUBCUTANEOUS
Status: DISCONTINUED | OUTPATIENT
Start: 2017-12-09 | End: 2017-12-10 | Stop reason: HOSPADM

## 2017-12-09 RX ADMIN — OXYCODONE HYDROCHLORIDE 20 MG: 10 TABLET ORAL at 01:38

## 2017-12-09 RX ADMIN — OXYCODONE HYDROCHLORIDE 10 MG: 5 SOLUTION ORAL at 15:56

## 2017-12-09 RX ADMIN — ONDANSETRON 4 MG: 2 INJECTION INTRAMUSCULAR; INTRAVENOUS at 03:04

## 2017-12-09 RX ADMIN — CEFTRIAXONE 2 G: 2 INJECTION, POWDER, FOR SOLUTION INTRAMUSCULAR; INTRAVENOUS at 08:25

## 2017-12-09 RX ADMIN — BUPROPION HYDROCHLORIDE 100 MG: 100 TABLET, FILM COATED ORAL at 08:18

## 2017-12-09 RX ADMIN — ACETAMINOPHEN 650 MG: 325 TABLET, FILM COATED ORAL at 03:03

## 2017-12-09 RX ADMIN — Medication 667 MG: at 18:22

## 2017-12-09 RX ADMIN — HYDROMORPHONE HYDROCHLORIDE 0.5 MG: 2 INJECTION INTRAMUSCULAR; INTRAVENOUS; SUBCUTANEOUS at 12:25

## 2017-12-09 RX ADMIN — HEPARIN 300 UNITS: 100 SYRINGE at 22:30

## 2017-12-09 RX ADMIN — HYDROMORPHONE HYDROCHLORIDE 0.5 MG: 2 INJECTION INTRAMUSCULAR; INTRAVENOUS; SUBCUTANEOUS at 17:20

## 2017-12-09 RX ADMIN — MIDAZOLAM 1 MG: 1 INJECTION INTRAMUSCULAR; INTRAVENOUS at 16:10

## 2017-12-09 RX ADMIN — HEPARIN SODIUM 5000 UNITS: 5000 INJECTION, SOLUTION INTRAVENOUS; SUBCUTANEOUS at 21:50

## 2017-12-09 RX ADMIN — MEPERIDINE HYDROCHLORIDE 25 MG: 25 INJECTION INTRAMUSCULAR; INTRAVENOUS; SUBCUTANEOUS at 16:00

## 2017-12-09 RX ADMIN — HYDROMORPHONE HYDROCHLORIDE 0.5 MG: 2 INJECTION INTRAMUSCULAR; INTRAVENOUS; SUBCUTANEOUS at 17:08

## 2017-12-09 RX ADMIN — STANDARDIZED SENNA CONCENTRATE AND DOCUSATE SODIUM 2 TABLET: 8.6; 5 TABLET, FILM COATED ORAL at 21:50

## 2017-12-09 RX ADMIN — OXYCODONE HYDROCHLORIDE 20 MG: 10 TABLET ORAL at 21:50

## 2017-12-09 RX ADMIN — OXYCODONE HYDROCHLORIDE 10 MG: 5 TABLET ORAL at 16:13

## 2017-12-09 RX ADMIN — Medication 325 MG: at 08:19

## 2017-12-09 RX ADMIN — HYDROXYCHLOROQUINE SULFATE 200 MG: 200 TABLET ORAL at 21:49

## 2017-12-09 RX ADMIN — OXYCODONE HYDROCHLORIDE AND ACETAMINOPHEN 500 MG: 500 TABLET ORAL at 08:18

## 2017-12-09 RX ADMIN — TIZANIDINE 8 MG: 4 TABLET ORAL at 21:49

## 2017-12-09 RX ADMIN — CHOLECALCIFEROL TAB 10 MCG (400 UNIT) 400 UNITS: 10 TAB at 08:18

## 2017-12-09 RX ADMIN — LIDOCAINE 1 PATCH: 50 PATCH TOPICAL at 05:38

## 2017-12-09 RX ADMIN — TRAZODONE HYDROCHLORIDE 50 MG: 50 TABLET ORAL at 21:50

## 2017-12-09 RX ADMIN — MEROPENEM 500 MG: 500 INJECTION, POWDER, FOR SOLUTION INTRAVENOUS at 13:17

## 2017-12-09 RX ADMIN — MEPERIDINE HYDROCHLORIDE 25 MG: 25 INJECTION INTRAMUSCULAR; INTRAVENOUS; SUBCUTANEOUS at 16:05

## 2017-12-09 RX ADMIN — OXYCODONE HYDROCHLORIDE 10 MG: 10 TABLET ORAL at 16:13

## 2017-12-09 RX ADMIN — PREGABALIN 75 MG: 75 CAPSULE ORAL at 08:19

## 2017-12-09 ASSESSMENT — ENCOUNTER SYMPTOMS
CHILLS: 0
SEIZURES: 0
FEVER: 0
NAUSEA: 0
ABDOMINAL PAIN: 0
BRUISES/BLEEDS EASILY: 0
BLURRED VISION: 0
POLYDIPSIA: 0
FOCAL WEAKNESS: 0
DEPRESSION: 0
PALPITATIONS: 0
LOSS OF CONSCIOUSNESS: 0
SORE THROAT: 0
SINUS PAIN: 0
BACK PAIN: 1
DOUBLE VISION: 0
COUGH: 0
SHORTNESS OF BREATH: 0
HEADACHES: 0
VOMITING: 0
FLANK PAIN: 1
FALLS: 0
DIZZINESS: 0
MYALGIAS: 0
HEARTBURN: 0
WEAKNESS: 1
NECK PAIN: 1

## 2017-12-09 ASSESSMENT — PAIN SCALES - GENERAL
PAINLEVEL_OUTOF10: 8
PAINLEVEL_OUTOF10: 9
PAINLEVEL_OUTOF10: 7
PAINLEVEL_OUTOF10: 8
PAINLEVEL_OUTOF10: 8
PAINLEVEL_OUTOF10: 5
PAINLEVEL_OUTOF10: 7
PAINLEVEL_OUTOF10: 8
PAINLEVEL_OUTOF10: 7
PAINLEVEL_OUTOF10: 9

## 2017-12-09 NOTE — PROGRESS NOTES
Internal Medicine Interval Note  Note Author: Haylie Coronado M.D.     Name Debby Carrizales 1980   Age/Sex 37 y.o. female   MRN [id number]   Code Status Full      After 5PM or if no immediate response to page, please call for cross-coverage  Attending/Team: Cindy/Madelyn See Patient List for primary contact information  Call [phone] to page    1st Call - Day Intern (R1):   Dr. Rhoades  2nd Call - Day Sr. Resident (R2/R3):   Dr. Coronado         Reason for interval visit  (Principal Problem)   AV graft occlusion    Interval Problem Daily Status Update  (24 hours)     Saw this patient at bedside this morning. She denies any active complaints. Discussed plan of care with her and answered all her question. She underwent HD yesterday.    Right AV graft occlusion: Patient is going to have open thrombectomy and revision today. For generalized body pain ordered dilaudid 0.5 mg IV once.    ESBL UTI: Patient urine culture showed ESBL. Started her on meropenum. Requested consult with ID.    Review of Systems   Constitutional: Positive for malaise/fatigue. Negative for chills and fever.   HENT: Negative for hearing loss, sinus pain, sore throat and tinnitus.    Eyes: Negative for blurred vision and double vision.   Respiratory: Negative for cough and shortness of breath.    Cardiovascular: Negative for chest pain and palpitations.   Gastrointestinal: Negative for abdominal pain, heartburn, nausea and vomiting.   Genitourinary: Positive for flank pain. Negative for dysuria, frequency and urgency.   Musculoskeletal: Positive for back pain, joint pain and neck pain. Negative for falls and myalgias.   Skin: Negative for itching and rash.   Neurological: Positive for weakness. Negative for dizziness, focal weakness, seizures, loss of consciousness and headaches.   Endo/Heme/Allergies: Negative for polydipsia. Does not bruise/bleed easily.   Psychiatric/Behavioral: Negative for depression and  suicidal ideas.       Consultants/Specialty  Nephrology (Dr. Gotti)  Surgery (Dr. Jansen)    Disposition  Inpatient    Quality Measures    Reviewed items::  EKG reviewed, Labs reviewed, Medications reviewed and Radiology images reviewed  Taylor catheter::  No Taylor  DVT prophylaxis pharmacological::  Heparin  DVT prophylaxis - mechanical:  Not indicated at this time, ambulatory  Ulcer Prophylaxis::  Not indicated          Physical Exam       Vitals:    12/08/17 1510 12/08/17 1900 12/09/17 0300 12/09/17 0750   BP: 106/72 (!) 95/56 (!) 94/57 (!) 84/53   Pulse: 93 87 65 61   Resp: 15 16 16 16   Temp: 37.4 °C (99.3 °F) 36.6 °C (97.8 °F) 36.7 °C (98 °F) 36.2 °C (97.1 °F)   SpO2: 95% 96% 97% 96%   Weight:   83.5 kg (184 lb 1.4 oz)    Height:         Body mass index is 30.63 kg/m². Weight: 83.5 kg (184 lb 1.4 oz)  Oxygen Therapy:  Pulse Oximetry: 96 %, O2 (LPM): 0, O2 Delivery: None (Room Air)    Physical Exam   Constitutional: She is oriented to person, place, and time and well-developed, well-nourished, and in no distress. No distress.   HENT:   Head: Normocephalic and atraumatic.   Eyes: Conjunctivae and EOM are normal. Pupils are equal, round, and reactive to light. Right eye exhibits no discharge. Left eye exhibits no discharge. No scleral icterus.   Neck: Normal range of motion. Neck supple. No JVD present.   Cardiovascular: Normal rate and regular rhythm.  Exam reveals no gallop and no friction rub.    No murmur heard.  Pulmonary/Chest: Effort normal and breath sounds normal. No respiratory distress.   Abdominal: Soft. Bowel sounds are normal. She exhibits no distension. There is no tenderness.   Musculoskeletal: Normal range of motion. She exhibits tenderness. She exhibits no edema.   AV graft to the right ARM  No erythema, or edema  Area warm to touch   Lymphadenopathy:     She has no cervical adenopathy.   Neurological: She is alert and oriented to person, place, and time. No cranial nerve deficit. GCS score  is 15.   Skin: Skin is warm and dry. No rash noted. She is not diaphoretic. No erythema. No pallor.   Psychiatric: Mood, memory, affect and judgment normal.       Lab Data Review:     12/7/2017  3:00 PM    Recent Labs      12/07/17 0140 12/08/17 0440   SODIUM  135  137   POTASSIUM  4.0  5.1   CHLORIDE  96  102   CO2  23  23   BUN  80*  50*   CREATININE  8.49*  6.50*   MAGNESIUM   --   2.7*   PHOSPHORUS   --   7.9*   CALCIUM  8.6  9.5       Recent Labs      12/07/17 0140 12/08/17 0440   ALTSGPT  7  10   ASTSGOT  9*  11*   ALKPHOSPHAT  87  91   TBILIRUBIN  0.4  0.4   GLUCOSE  89  105*       Recent Labs      12/07/17 0140 12/08/17 0440   RBC  3.99*  4.21   HEMOGLOBIN  12.7  12.9   HEMATOCRIT  38.1  41.3   PLATELETCT  130*  130*   PROTHROMBTM  13.0   --    APTT  61.2*   --    INR  1.01   --        Recent Labs      12/07/17 0140 12/08/17 0440   WBC  6.5  4.5*   NEUTSPOLYS  51.20  52.40   LYMPHOCYTES  31.90  29.10   MONOCYTES  10.20  11.40   EOSINOPHILS  4.90  4.50   BASOPHILS  0.90  1.30   ASTSGOT  9*  11*   ALTSGPT  7  10   ALKPHOSPHAT  87  91   TBILIRUBIN  0.4  0.4           Assessment/Plan     AV fistula occlusion (CMS-HCC)   Assessment & Plan    Right AV graft occlusion she is going to have open thrombectomy and revision today.         ESRD (end stage renal disease) on dialysis (CMS-HCC)- (present on admission)   Assessment & Plan    - Patient on HD MWF  - Patient missed one session of hemodialysis  - No gross abnormalities with [facility]  - Nephrology on board, Will follow recommendations  - Labs on AM        Chronic lupus nephritis (CMS-HCC)- (present on admission)   Assessment & Plan    - Per pt rheum has some concern about possibility of flare, no notes suggesting change of mgmt however  - Continue home hydroxychloroquine         UTI (urinary tract infection)- (present on admission)   Assessment & Plan    - Complains of dysuria, hematuria, and green urine; mentions fever and N/V  - PMH of ESBL pyelo  in 2015, most recent UTI in 2/6/17 pansensitive E coli however  - UA suggestive of infection  - Continue C3         Hypertension- (present on admission)   Assessment & Plan    - Continue home amlodipine        Avascular necrosis of bones of both hips (HCC)- (present on admission)   Assessment & Plan    - Continue home pain regimen         Nausea & vomiting- (present on admission)   Assessment & Plan    - Last vomited morning prior to arrival, chronic, reoccurring since admission in November  - Possibly 2/2 UTI  -Currently asymptomatic  - Continue Zofran and phenergan PRN          Depression- (present on admission)   Assessment & Plan    - Continue home bupropion         Drug-seeking behavior- (present on admission)   Assessment & Plan    - Patient with history of drug-seeking behavior  - Limited IV narcotic use

## 2017-12-09 NOTE — ASSESSMENT & PLAN NOTE
- Patient urine culture came back positive for ESBL.  - Discussed with her and started her on meropenum.  - As per infectious diseases recommendation she switched to a 1 dose of fosfomycin right before discharge

## 2017-12-09 NOTE — PROGRESS NOTES
Right AV graft pending open thrombectomy and revision tomorrow by Dr. Jansen  Potassium normal.   Right arm AV graft occluded.  Right IJ triple lumen dialysis catheter to remain until AV graft restored.  Discussed with patient.

## 2017-12-09 NOTE — PROGRESS NOTES
Late entry 0929 - Patient is AAOx4, complains of 8/10 pain.  Medicated per MAR.  Tolerated all oral medications.  Skin and sacral assessment complete.  On RA.  Pending dialysis this morning.  Up self, no bed alarm.  Bed locked in lowest position, treaded socks in place, call light within reach, POC discussed, hourly rounding in place.

## 2017-12-09 NOTE — PROGRESS NOTES
Patient is AAOx4, complains of 7/10 pain, hot packs in use.  Flat affect. During morning vitals, BP was 84/53, MD made aware, orders to recheck and hold oxycodone if SBP <100.  Ok to give Lyrica.  BP 98/56 on recheck.  Patient NPO at this time, pending surgery.  Tolerated scheduled medications.  On room air.  No bed alarm, patient is up self.  Bed locked in lowest position, treaded socks in place, call light within reach, POC discussed, all needs met at this time.

## 2017-12-09 NOTE — PROGRESS NOTES
[facility] Progress Note    Hemodialysis treatment ordered today per Dr. Gotti x 3 hours. Treatment initiated at 1156, ended at 1458.     Not able to reach UF goal r/t fluctuating BP; see paper flow sheet for details.     Net UF 1661 mL.     Post tx, CVC flushed with saline then locked with heparin 1000 units/mL per designated amount in each wing then clamped and capped. Aspirate heparin prior to next CVC use.    Report given to Primary RN.

## 2017-12-09 NOTE — CARE PLAN
Problem: Pain Management  Goal: Pain level will decrease to patient's comfort goal  Outcome: PROGRESSING AS EXPECTED  Pain 10/10.  Medicated per MAR.  MD made aware, new orders received.      Problem: Psychosocial Needs:  Goal: Level of anxiety will decrease  Outcome: PROGRESSING AS EXPECTED  One on one discussion provided.

## 2017-12-10 VITALS
TEMPERATURE: 97 F | BODY MASS INDEX: 30.67 KG/M2 | OXYGEN SATURATION: 96 % | DIASTOLIC BLOOD PRESSURE: 62 MMHG | SYSTOLIC BLOOD PRESSURE: 100 MMHG | WEIGHT: 184.08 LBS | HEIGHT: 65 IN | HEART RATE: 70 BPM | RESPIRATION RATE: 16 BRPM

## 2017-12-10 LAB
ALBUMIN SERPL BCP-MCNC: 3.5 G/DL (ref 3.2–4.9)
ALBUMIN/GLOB SERPL: 1.5 G/DL
ALP SERPL-CCNC: 77 U/L (ref 30–99)
ALT SERPL-CCNC: 6 U/L (ref 2–50)
ANION GAP SERPL CALC-SCNC: 12 MMOL/L (ref 0–11.9)
AST SERPL-CCNC: 9 U/L (ref 12–45)
BASOPHILS # BLD AUTO: 1.1 % (ref 0–1.8)
BASOPHILS # BLD: 0.05 K/UL (ref 0–0.12)
BILIRUB SERPL-MCNC: 0.3 MG/DL (ref 0.1–1.5)
BUN SERPL-MCNC: 63 MG/DL (ref 8–22)
CALCIUM SERPL-MCNC: 8.8 MG/DL (ref 8.5–10.5)
CHLORIDE SERPL-SCNC: 99 MMOL/L (ref 96–112)
CO2 SERPL-SCNC: 23 MMOL/L (ref 20–33)
CREAT SERPL-MCNC: 8.58 MG/DL (ref 0.5–1.4)
EOSINOPHIL # BLD AUTO: 0.22 K/UL (ref 0–0.51)
EOSINOPHIL NFR BLD: 4.9 % (ref 0–6.9)
ERYTHROCYTE [DISTWIDTH] IN BLOOD BY AUTOMATED COUNT: 48.9 FL (ref 35.9–50)
GFR SERPL CREATININE-BSD FRML MDRD: 5 ML/MIN/1.73 M 2
GLOBULIN SER CALC-MCNC: 2.4 G/DL (ref 1.9–3.5)
GLUCOSE SERPL-MCNC: 98 MG/DL (ref 65–99)
HCT VFR BLD AUTO: 35.6 % (ref 37–47)
HGB BLD-MCNC: 11.5 G/DL (ref 12–16)
IMM GRANULOCYTES # BLD AUTO: 0.05 K/UL (ref 0–0.11)
IMM GRANULOCYTES NFR BLD AUTO: 1.1 % (ref 0–0.9)
LYMPHOCYTES # BLD AUTO: 1.16 K/UL (ref 1–4.8)
LYMPHOCYTES NFR BLD: 25.9 % (ref 22–41)
MCH RBC QN AUTO: 32.3 PG (ref 27–33)
MCHC RBC AUTO-ENTMCNC: 32.3 G/DL (ref 33.6–35)
MCV RBC AUTO: 100 FL (ref 81.4–97.8)
MONOCYTES # BLD AUTO: 0.59 K/UL (ref 0–0.85)
MONOCYTES NFR BLD AUTO: 13.2 % (ref 0–13.4)
NEUTROPHILS # BLD AUTO: 2.41 K/UL (ref 2–7.15)
NEUTROPHILS NFR BLD: 53.8 % (ref 44–72)
NRBC # BLD AUTO: 0 K/UL
NRBC BLD AUTO-RTO: 0 /100 WBC
PLATELET # BLD AUTO: 87 K/UL (ref 164–446)
PMV BLD AUTO: 10.4 FL (ref 9–12.9)
POTASSIUM SERPL-SCNC: 4.7 MMOL/L (ref 3.6–5.5)
PROT SERPL-MCNC: 5.9 G/DL (ref 6–8.2)
RBC # BLD AUTO: 3.56 M/UL (ref 4.2–5.4)
SODIUM SERPL-SCNC: 134 MMOL/L (ref 135–145)
WBC # BLD AUTO: 4.5 K/UL (ref 4.8–10.8)

## 2017-12-10 PROCEDURE — 700111 HCHG RX REV CODE 636 W/ 250 OVERRIDE (IP): Performed by: INTERNAL MEDICINE

## 2017-12-10 PROCEDURE — 85025 COMPLETE CBC W/AUTO DIFF WBC: CPT

## 2017-12-10 PROCEDURE — 700102 HCHG RX REV CODE 250 W/ 637 OVERRIDE(OP): Performed by: STUDENT IN AN ORGANIZED HEALTH CARE EDUCATION/TRAINING PROGRAM

## 2017-12-10 PROCEDURE — 700101 HCHG RX REV CODE 250: Performed by: STUDENT IN AN ORGANIZED HEALTH CARE EDUCATION/TRAINING PROGRAM

## 2017-12-10 PROCEDURE — 700111 HCHG RX REV CODE 636 W/ 250 OVERRIDE (IP): Performed by: HOSPITALIST

## 2017-12-10 PROCEDURE — A9270 NON-COVERED ITEM OR SERVICE: HCPCS | Performed by: STUDENT IN AN ORGANIZED HEALTH CARE EDUCATION/TRAINING PROGRAM

## 2017-12-10 PROCEDURE — 99239 HOSP IP/OBS DSCHRG MGMT >30: CPT | Mod: GC | Performed by: HOSPITALIST

## 2017-12-10 PROCEDURE — 80053 COMPREHEN METABOLIC PANEL: CPT

## 2017-12-10 PROCEDURE — 700111 HCHG RX REV CODE 636 W/ 250 OVERRIDE (IP): Performed by: STUDENT IN AN ORGANIZED HEALTH CARE EDUCATION/TRAINING PROGRAM

## 2017-12-10 PROCEDURE — 700101 HCHG RX REV CODE 250: Performed by: INTERNAL MEDICINE

## 2017-12-10 RX ORDER — OXYCODONE HYDROCHLORIDE 20 MG/1
1 TABLET ORAL EVERY 6 HOURS PRN
Qty: 12 TAB | Refills: 0 | Status: SHIPPED | OUTPATIENT
Start: 2017-12-10 | End: 2018-04-18

## 2017-12-10 RX ORDER — GRANULES FOR ORAL 3 G/1
3 POWDER ORAL ONCE
Status: COMPLETED | OUTPATIENT
Start: 2017-12-10 | End: 2017-12-10

## 2017-12-10 RX ADMIN — CHOLECALCIFEROL TAB 10 MCG (400 UNIT) 400 UNITS: 10 TAB at 08:45

## 2017-12-10 RX ADMIN — Medication 325 MG: at 08:45

## 2017-12-10 RX ADMIN — PREGABALIN 75 MG: 75 CAPSULE ORAL at 08:45

## 2017-12-10 RX ADMIN — LIDOCAINE 1 PATCH: 50 PATCH TOPICAL at 05:49

## 2017-12-10 RX ADMIN — HEPARIN 500 UNITS: 100 SYRINGE at 12:23

## 2017-12-10 RX ADMIN — STANDARDIZED SENNA CONCENTRATE AND DOCUSATE SODIUM 2 TABLET: 8.6; 5 TABLET, FILM COATED ORAL at 08:45

## 2017-12-10 RX ADMIN — OXYCODONE HYDROCHLORIDE AND ACETAMINOPHEN 500 MG: 500 TABLET ORAL at 08:45

## 2017-12-10 RX ADMIN — Medication 667 MG: at 11:06

## 2017-12-10 RX ADMIN — Medication 667 MG: at 05:49

## 2017-12-10 RX ADMIN — FOSFOMYCIN TROMETHAMINE 3 G: 3 POWDER ORAL at 12:00

## 2017-12-10 RX ADMIN — MEROPENEM 500 MG: 500 INJECTION, POWDER, FOR SOLUTION INTRAVENOUS at 09:48

## 2017-12-10 RX ADMIN — OXYCODONE HYDROCHLORIDE 20 MG: 10 TABLET ORAL at 04:48

## 2017-12-10 RX ADMIN — OXYCODONE HYDROCHLORIDE 20 MG: 10 TABLET ORAL at 09:05

## 2017-12-10 RX ADMIN — HEPARIN SODIUM 5000 UNITS: 5000 INJECTION, SOLUTION INTRAVENOUS; SUBCUTANEOUS at 05:49

## 2017-12-10 RX ADMIN — BUPROPION HYDROCHLORIDE 100 MG: 100 TABLET, FILM COATED ORAL at 08:45

## 2017-12-10 ASSESSMENT — LIFESTYLE VARIABLES: DO YOU DRINK ALCOHOL: NO

## 2017-12-10 ASSESSMENT — PAIN SCALES - GENERAL
PAINLEVEL_OUTOF10: 8
PAINLEVEL_OUTOF10: 6

## 2017-12-10 NOTE — OR NURSING
Pt arrives to pacu 22 with OR team. Right upper arm av fistula dressing c/d/i with gauze and tegaderm. Right IJ permacath in place. Dressing c/d/i. Fluids attached to power port.[id number]

## 2017-12-10 NOTE — DISCHARGE PLANNING
Care Transition Team Assessment    Patient has a long history of non-compliance in following up on her Dialysis. States she has been complaint since August of this year, last admit 11/24-it is notes patient missed her dialysis the previous day.   Patient is aware of [facility] transport benefits, but has not followed up for transport assistance.     Information Source  Orientation : Oriented x 4  Information Given By: Patient    Readmission Evaluation  Is this a readmission?: Yes - unplanned readmission  Why do you think you were readmitted?:  (Same reason as the previous admit)  Was an appointment arranged for you prior to discharge?: No  Were there new prescriptions you were supposed to fill after you were discharged?: No  Did you understand your discharge instructions?: Yes  Did you have enough support after your last discharge?: Yes    Elopement Risk  Legal Hold: No  Ambulatory or Self Mobile in Wheelchair: Yes  Disoriented: No  Psychiatric Symptoms: None  History of Wandering: No  Elopement this Admit: No  Vocalizing Wanting to Leave: No  Displays Behaviors, Body Language Wanting to Leave: No-Not at Risk for Elopement  Elopement Risk: Not at Risk for Elopement    Interdisciplinary Discharge Planning  Does Admitting Nurse Feel This Could be a Complex Discharge?: No  Lives with - Patient's Self Care Capacity: Alone and Able to Care For Self  Housing / Facility: 2 Story House  Do You Take your Prescribed Medications Regularly: Yes  Able to Return to Previous ADL's: Yes  Mobility Issues: No    Discharge Preparedness  What is your plan after discharge?: Uncertain - pending medical team collaboration  What are your discharge supports?: Grandparent  Prior Functional Level: Drives Self, Uses Cane, Uses Wheelchair  Difficulity with ADLs: Walking  Difficulity with IADLs: None    Functional Assesment  Prior Functional Level: Drives Self, Uses Cane, Uses Wheelchair    Finances  Financial Barriers to Discharge: No ([employer]- $842.00,  $80.00 in [employer] [place])  Prescription Coverage: Yes    Vision / Hearing Impairment  Vision Impairment : Yes  Right Eye Vision: Impaired, Wears Glasses  Left Eye Vision: Impaired, Wears Glasses  Hearing Impairment : No    Values / Beliefs / Concerns  Values / Beliefs Concerns : No    Advance Directive  Advance Directive?: None  Advance Directive offered?: AD Booklet refused    Domestic Abuse  Have you ever been the victim of abuse or violence?: No  Physical Abuse or Sexual Abuse: No  Verbal Abuse or Emotional Abuse: No  Possible Abuse Reported to:: Not Applicable    Psychological Assessment  History of Substance Abuse: None  History of Psychiatric Problems: Yes  Non-compliant with Treatment: Yes  Newly Diagnosed Illness: No    Discharge Risks or Barriers  Discharge risks or barriers?: Non-adherence to medication or treatment  Patient risk factors: Noncompliance    Anticipated Discharge Information  Anticipated discharge disposition: Home  Discharge Address:  ([address] [place] [address])  Discharge Contact Phone Number:  ([phone])

## 2017-12-10 NOTE — OR NURSING
PAGED DR LOPEZ'S SERVICE. DR LOPEZ RETURNS CALL. UPDATE. PT REPORTS RIGHT UPPER ARM PAIN R/T SURGERY. RATE 9/10. PT GRIMACING. TENSE. NOT TAKING DEEP BREATHS.     PT HAS HISTORY OF CHRONIC PAIN/LUPUS.  PT IS A GOOD HISTORIAN. MISSED DOSES OF SCHEDULED PAIN MEDICATION DUE TO NPO FOR SURGERY. HAS RECEIVED OXYCODONE 20 MG PO, DEMEROL 25 MG IV.     DR LOPEZ ORDERS DILAUDID 0.5 MG IV . MAY REPEAT TIMES 1 IN 10 MINUTES.

## 2017-12-10 NOTE — DISCHARGE INSTRUCTIONS
Discharge Instructions    Discharged to home by car with relative. Discharged via wheelchair, hospital escort: Yes.  Special equipment needed: Not Applicable    Be sure to schedule a follow-up appointment with your primary care doctor or any specialists as instructed.     Discharge Plan:   Influenza Vaccine Indication: Patient Refuses    I understand that a diet low in cholesterol, fat, and sodium is recommended for good health. Unless I have been given specific instructions below for another diet, I accept this instruction as my diet prescription.     Special Instructions: None    · Is patient discharged on Warfarin / Coumadin?   No     · Is patient Post Blood Transfusion?  No    Depression / Suicide Risk    As you are discharged from this [facility] facility, it is important to learn how to keep safe from harming yourself.    Recognize the warning signs:  · Abrupt changes in personality, positive or negative- including increase in energy   · Giving away possessions  · Change in eating patterns- significant weight changes-  positive or negative  · Change in sleeping patterns- unable to sleep or sleeping all the time   · Unwillingness or inability to communicate  · Depression  · Unusual sadness, discouragement and loneliness  · Talk of wanting to die  · Neglect of personal appearance   · Rebelliousness- reckless behavior  · Withdrawal from people/activities they love  · Confusion- inability to concentrate     If you or a loved one observes any of these behaviors or has concerns about self-harm, here's what you can do:  · Talk about it- your feelings and reasons for harming yourself  · Remove any means that you might use to hurt yourself (examples: pills, rope, extension cords, firearm)  · Get professional help from the community (Mental Health, Substance Abuse, psychological counseling)  · Do not be alone:Call your Safe Contact- someone whom you trust who will be there for you.  · Call your local CRISIS HOTLINE  "886-3415 or 739-506-3626  · Call your local Children's Mobile Crisis Response Team [place] (555) 630-0909 or www.3DSoC  · Call the toll free National Suicide Prevention Hotlines   · National Suicide Prevention Lifeline 097-122-OWGR (4340)  · National Hope Line Network 800-SUICIDE (786-3396)        Extended Spectrum Beta Lactamase   Beta lactamase is a chemical produced by bacteria. Only a few bacteria make beta lactamase. These bacteria do not make beta lactamase all of the time. When they do, the bacteria usually protects itself from antibiotics. This means that the bacteria can keep growing, and the person with the infection will stay sick. Bacteria that produce beta lactamase are said to be \"resistant\" to many antibiotics.  The chemical is called an Extended Spectrum Beta Lactamase (ESBL) when it gives the bacteria strong resistance to many antibiotics. People who become infected with bacteria that produce ESBL may become very ill.  Testing is done to help diagnose and treat the infection. Testing is first done to find out which germ is causing the infection. Additional tests are done to see if the bacteria causing the infection are making ESBL. If the bacteria are making ESBL, then further tests are done to find exactly which antibiotics will or will not help control the infection. This allows your caregiver to identify a treatment so you can get better faster.  PREPARATION FOR TEST   Your caregiver may ask for samples of blood, urine or other body fluids. If ESBL is present, it would be found in blood or body fluids.  NORMAL FINDINGS   No EBSL.  MEANING OF TEST   This test helps your caregiver determine whether you have an infection with bacteria that make ESBL. This will allow your caregiver to choose the best treatment for you.  OBTAINING THE RESULTS   It is your responsibility to obtain your test results. Ask the lab or department performing the test when and how you will get your " results.  Document Released: 11/30/2009 Document Revised: 03/11/2013 Document Reviewed: 11/30/2009  ExitCare® Patient Information ©2014 [employer], "[employer].

## 2017-12-10 NOTE — PROGRESS NOTES
Prior to change implanted port needle, this RN asked pt if she know size of psoey needle.  Pt stated that its 24G or smallest needle.  22G was smallest this RN can find.  De accessed current one after heparin flush.  Re accessed with 22G.  Positive blood return noted.

## 2017-12-10 NOTE — PROGRESS NOTES
RN assumed care of patient. Patient resting comfortably in bed. Right arm dressing intact. Scant old drainage noticed. Patient asking for pain med. Followed up with resident team to establish a plan for pain management.

## 2017-12-10 NOTE — OR SURGEON
Immediate Post OP Note    PreOp Diagnosis: Thrombosed right upper extremity AVG      PostOp Diagnosis: Same    Procedure(s):  AVG THROMBECTOMY - Wound Class: Clean    Surgeon(s):  Lesly Jansen M.D.    Anesthesiologist/Type of Anesthesia:  Anesthesiologist: Antonio Mills M.D./General    Surgical Staff:  Circulator: Neftaly Iverson R.N.  Scrub Person: Suly Mason    Specimens:clot      Estimated Blood Loss: 125cc    Findings: no obvious stenoses    Complications: none    [id number]    12/9/2017 4:33 PM Lesly Jansen

## 2017-12-10 NOTE — PROGRESS NOTES
Received bedside report from day shift RN. Pt is alert and oriented x4, complains of pain 7/10 on right arm, medicated per MAR. Dialysis AV fistula on right upper arm, bruit and thrill present. Pt refuses bed alarm, educated on safety risks involved and importance of calling for assistance as needed. Bed in lowest position, call light within reach, and treaded slipper socks on pt.

## 2017-12-10 NOTE — CARE PLAN
Problem: Infection  Goal: Will remain free from infection  Outcome: PROGRESSING AS EXPECTED  Antibiotic given per MAR.    Problem: Pain Management  Goal: Pain level will decrease to patient's comfort goal  Outcome: PROGRESSING AS EXPECTED  Pain c/o pain, medicated with one time dose of scheduled Dilaudid.     Problem: Psychosocial Needs:  Goal: Level of anxiety will decrease  Outcome: PROGRESSING AS EXPECTED  One on one discussion provided, patient updated on POC, all questions answered.

## 2017-12-10 NOTE — CARE PLAN
Problem: Safety  Goal: Will remain free from falls  Outcome: PROGRESSING AS EXPECTED  Educate pt on fall risk precautions and importance of calling for assistance as needed. Pt refuses bed alarm, educate pt on safety risks involved. Bed in lowest position, call light within reach, treaded slipper socks on pt.    Problem: Infection  Goal: Will remain free from infection  Outcome: PROGRESSING AS EXPECTED  Educate pt on importance of personal hygiene to help prevent infection. Pt received CHG bath by CNA. Contact isolation precautions in place for ESBL.

## 2017-12-10 NOTE — OP REPORT
DATE OF SERVICE:  12/09/2017    PREOPERATIVE DIAGNOSIS:  Thrombosed right upper extremity arteriovenous graft.    POSTPROCEDURE DIAGNOSIS:  Thrombosed right upper extremity arteriovenous   graft.    PROCEDURES:  1.  Thrombectomy, right upper extremity arteriovenous graft.  2.  Fistulogram.  3.  Exploration of right arterial anastomosis.    SURGEON:  Lesly Jansen MD.    ANESTHESIOLOGIST:  Antonio Mills MD.    ANESTHESIA:  General.    INDICATIONS:  The patient is a 37-year-old woman who has lupus nephritis   resulting in end-stage renal failure.  She was brought to the operating room   for operative thrombectomy of her arteriovenous graft.  She underwent a   percutaneous procedure about 2 weeks ago, which has subsequently failed.  I   suspect this is due to retained clot within the graft or a venous outflow   stenosis that could not be treated.  Risks and benefits were explained and   include bleeding, infection, arteriovenous thrombosis and re-intervention.    She understands and agrees to proceed.    DESCRIPTION OF PROCEDURE:  Patient was taken to the operating room and placed   in supine position.  After adequate general anesthesia, the upper extremity   was prepped and draped in usual sterile fashion with Chloraseptic prep, cloth   towels and paper drapes.  A time-out was called and the proposed procedure   reviewed with all team members.    An incision was made in the axilla and dissection taken around to the venous   anastomosis.  The outflow vessels were encircled with vessel loops and the   patient given systemic heparin.  When this was allowed to circulate, I opened   the venous anastomosis and opened the graft after control.  A #4 Alex   catheter was placed retrograde and thrombus was removed from the graft.  I   then passed a graft thrombectomy catheter and removed adherent debris from the   graft.  On-table angiogram of the graft itself showed the possibility of   stenosis at the arterial  anastomosis.  I performed an outflow angiogram, which   showed mild stenosis at the proximal subclavian vein; however, without   forward flow I did not feel this was a significant finding.  The catheter   itself appeared to show relative obstruction in the outflow tract as well.  I   then used a piece of PTFE to patch the outflow anastomosis.  A 5-0 Prolene   suture was used to suture this in place.    I then opened the arterial anastomosis and encircled the brachial artery above   and below the anastomosis as well as the graft.  I opened the graft   transversally and using an endarterectomy spatula I removed all residual   thrombus from that arterial end.  I repaired this with interrupted 5-0 Prolene   suture.  When complete, I closed this with interrupted 5-0 Prolene suture.    I planned to remove the catheter in the recovery area so that good outflow can   be obtained.    The wounds were irrigated and closed in layers with interrupted 3-0 Vicryl and   4-0 Vicryl followed by a running 3-0 Vicryl and finally, 4-0 Monocryl in a   subcuticular fashion.  Sterile occlusive dressings were placed at both ends.    There were no apparent complications.  Sponge, needle and instrument counts   were correct x2.       ____________________________________     MD MARBELLA Lynn / ISS[facility]    DD:  12/09/2017 16:33:50  DT:  12/09/2017 16:50:02    D#:  [id number]  Job#:  [id number]

## 2017-12-10 NOTE — DISCHARGE PLANNING
Medical Social Work  IMM letter presented to patient, rolled to Inpatient.  Patient is on contact isolation, verbal given.

## 2017-12-11 ENCOUNTER — PATIENT OUTREACH (OUTPATIENT)
Dept: HEALTH INFORMATION MANAGEMENT | Facility: OTHER | Age: 37
End: 2017-12-11

## 2017-12-11 NOTE — DISCHARGE SUMMARY
Internal Medicine Discharge Summary  Note Author: Anastacio Allan M.D.       Admit Date:  12/7/2017       Discharge Date:   12/10/2017      Service:   [facility] Internal Medicine white Team  Attending Physician(s):   Cindy  Senior Resident(s):   Ivonne  Wallace Resident(s):   Verona      Primary Diagnosis:   Thrombosed right upper extremity arteriovenous graft    Secondary Diagnoses:                Active Problems:    ESBL (extended spectrum beta-lactamase) producing bacteria infection POA: Yes    ESRD (end stage renal disease) on dialysis (CMS-[facility]) POA: Yes    AV fistula occlusion (CMS-[facility]) POA: Unknown    Chronic lupus nephritis (CMS-[facility]) POA: Yes    Drug-seeking behavior (Chronic) POA: Yes    Depression POA: Yes    Nausea & vomiting POA: Yes      Overview: Likely related to infection and missing HD      Abdominal exam wnl      Tolerating clears      Will follow and advance diet as tolerated    Avascular necrosis of bones of both hips (HCC) POA: Yes    Hypertension POA: Yes  Resolved Problems:    * No resolved hospital problems. *      Hospital Summary (Brief Narrative):         36 yo F w/PMH of ESRD (2/2 lupus nephritis, hemodialysis MWF), HTN, HLD, recurrent UTI (ESBL in 2015), Lupus, avascular necrosis of the hip b/l, AKANKSHA/MDD, and opioid dependency was sent to the ED by rheumatologists for Rt arm AVG lack of thrill and was admitted for thrombosed right upper extremity arteriovenous graft. Nephrology and Surgery were consulted. As per Surgeries recommendation, Right temporary dialysis catheter was placed by interventional radiology and thus patient received hemodialysis. On 12/9/2017 patient underwent thrombectomy yhe right upper extremity atrophy of the venous graft and fistulogram by Dr Jansen. Patient tolerated the procedure well. As per surgeries recommendation, right IJ temporary catheter was discontinued.       Additionally, she presented with dysuria, hematuria, flank pain and a UA  suggestive of infectious process on admission. She was started on IV C3. Urine cultures grow ESBL. She was started on IV meropenem. As per infectious diseases recommendation she switched to a 1 dose of fosfomycin right before discharge. On discharge patient's vitals were stable and she was in no acute distress. Results and plan of action were discussed with the patient for which she understood and agreed with the primary care team. Patient was instructed to return to the ED if symptoms worsen. Patient was discharged home with Rx and instructed to follow-up with PCP within a week with surgery.    Patient /Hospital Summary (Details -- Problem Oriented) :          AV fistula occlusion (CMS-HCC)   Assessment & Plan    - S/P thrombectomy  - DC IJ temporary dialysis catheter as per surgeries recommendations  - AVG ready to use        ESRD (end stage renal disease) on dialysis (CMS-HCC)   Assessment & Plan    - Patient on HD MWF  - Patient missed one session of hemodialysis  - No gross abnormalities with CMP  - Nephrology on board, Will follow recommendations  - Labs on AM         ESBL (extended spectrum beta-lactamase) producing bacteria infection   Assessment & Plan    - Patient urine culture came back positive for ESBL.  - Discussed with her and started her on meropenum.  - As per infectious diseases recommendation she switched to a 1 dose of fosfomycin right before discharge        Chronic lupus nephritis (CMS-HCC)   Assessment & Plan    - Per pt rheum has some concern about possibility of flare, no notes suggesting change of mgmt however  - Continue home hydroxychloroquine          Hypertension   Assessment & Plan    - Continue home amlodipine         Avascular necrosis of bones of both hips (HCC)   Assessment & Plan    - Continue home pain regimen          Nausea & vomiting   Assessment & Plan    - Last vomited morning prior to arrival, chronic, reoccurring since admission in November  - Possibly 2/2 UTI  - Currently  asymptomatic  - Continue Zofran and phenergan PRN          Depression   Assessment & Plan    - Continue home bupropion          Drug-seeking behavior   Assessment & Plan    - Patient with history of drug-seeking behavior  - Limited IV narcotic use              Consultants:     Surgery (Dr. Jansen)    Procedures:        12/09/2017  1.  Thrombectomy, right upper extremity arteriovenous graft.  2.  Fistulogram.  3.  Exploration of right arterial anastomosis.    Imaging/ Testing:      IR-CVC NON TUNNELED > AGE 5   Final Result      1. Ultrasound and fluoroscopic guided placement of a right internal jugular 12 French triple lumen non-tunneled hemodialysis catheter.      2. The hemodialysis catheter may be used immediately as clinically indicated. Flushes per protocol.      DX-PORTABLE FLUORO > 1 HOUR    (Results Pending)         Discharge Medications:         Medication Reconciliation: Completed       Medication List      CONTINUE taking these medications      Instructions   amlodipine 5 MG Tabs  Commonly known as:  NORVASC   Take 5 mg by mouth as needed (Only if BP get 160).  Dose:  5 mg     ascorbic acid 500 MG Tabs  Commonly known as:  ascorbic acid   Take 500 mg by mouth every day.  Dose:  500 mg     calcium acetate 667 MG Caps  Commonly known as:  PHOS-LO   Take 2,668 mg by mouth 3 times a day, with meals.  Dose:  2668 mg     cholecalciferol 5000 UNIT Caps  Commonly known as:  VITAMIN D3   Take 10,000 Units by mouth every day.  Dose:  98250 Units     ferrous sulfate 325 (65 Fe) MG tablet   Take 325 mg by mouth every day.  Dose:  325 mg     hydroxychloroquine 200 MG Tabs  Commonly known as:  PLAQUENIL   Take 200 mg by mouth every bedtime.  Dose:  200 mg     hydrOXYzine HCl 25 MG Tabs  Commonly known as:  ATARAX   Take 0.5-1 Tabs by mouth 3 times a day as needed for Anxiety.  Dose:  12.5-25 mg     lidocaine 5 % Ptch  Commonly known as:  LIDODERM   Apply 1 Patch to skin as directed as needed (For back pain).  Dose:   1 Patch     Oxycodone HCl 20 MG Tabs   Take 1 Tab by mouth every 6 hours as needed.  Dose:  1 Tab     pregabalin 150 MG Caps  Commonly known as:  LYRICA   Take 150 mg by mouth 3 times a day.  Dose:  150 mg     promethazine 25 MG Tabs  Commonly known as:  PHENERGAN   Take 25 mg by mouth every 6 hours as needed for Nausea/Vomiting.  Dose:  25 mg     tizanidine 4 MG Tabs  Commonly known as:  ZANAFLEX   Take 2 Tabs by mouth every bedtime.  Dose:  8 mg     trazodone 50 MG Tabs  Commonly known as:  DESYREL   Take 1 Tab by mouth every bedtime.  Dose:  50 mg     VOLTAREN 1 % Gel  Generic drug:  Diclofenac Sodium   Apply 1 Application to skin as directed as needed (For right leg).  Dose:  1 Application     WELLBUTRIN  MG XL tablet  Generic drug:  buPROPion   Take 150 mg by mouth every morning.  Dose:  150 mg            Disposition:Patient to be discharged home    Diet: As tolerated    Activity: As tolerated    Instructions:      Patient is to follow-up with PCP within one week and surgery  The patient was instructed to return to the ER in the event of worsening symptoms. I have counseled the patient on the importance of compliance and the patient has agreed to proceed with all medical recommendations and follow up plan indicated above.   The patient understands that all medications come with benefits and risks. Risks may include permanent injury or death and these risks can be minimized with close reassessment and monitoring.        Primary Care Provider:      Discharge summary faxed to primary care provider:  Completed  Copy of discharge summary given to the patient: Completed      Follow up appointment details :      Patient is to follow-up with PCP within one week and surgery    Pending Studies:        None    Time spent on discharge day patient visit, preparing discharge paperwork and arranging for patient follow up.    Summary of follow up issues:   Pt is s/p AVG Thrombectomy and Fistulogram    Patient to  follow-up with Surgery  Patient to follow-up with PCP within one week    Discharge Time (Minutes) :    55 min  Hospital Course Type:  Inpatient Stay >2 midnights      Condition on Discharge    ______________________________________________________________________    Interval history/exam for day of discharge:    Patient was evaluated at bedside and found AAOX3, afebrile and in NAD. Pt is s/p AVG Thrombectomy and Fistulograml. Patient complaining of right arm surgical site pain. Surgical site clean, no erythema or secretions noted. Patient's vitals are stable. Patient was discharged home.    Vitals:    12/09/17 1900 12/10/17 0315 12/10/17 0431 12/10/17 0700   BP: (!) 95/62 (!) 83/52 (!) 80/46 100/62   Pulse: 82 60  70   Resp: 16 16  16   Temp: 36.7 °C (98 °F) 36.3 °C (97.4 °F)  36.1 °C (97 °F)   SpO2: 96% 93%  96%   Weight:       Height:         Weight/BMI: Body mass index is 30.63 kg/m².  Pulse Oximetry: 96 %, O2 (LPM): 0.5, O2 Delivery: Silicone Nasal Cannula    General: in NAD  CVS: RRR  PULM: CTA B/L    Most Recent Labs:    Lab Results   Component Value Date/Time    WBC 4.5 (L) 12/10/2017 04:23 AM    RBC 3.56 (L) 12/10/2017 04:23 AM    HEMOGLOBIN 11.5 (L) 12/10/2017 04:23 AM    HEMATOCRIT 35.6 (L) 12/10/2017 04:23 AM    .0 (H) 12/10/2017 04:23 AM    MCH 32.3 12/10/2017 04:23 AM    MCHC 32.3 (L) 12/10/2017 04:23 AM    MPV 10.4 12/10/2017 04:23 AM    NEUTSPOLYS 53.80 12/10/2017 04:23 AM    LYMPHOCYTES 25.90 12/10/2017 04:23 AM    MONOCYTES 13.20 12/10/2017 04:23 AM    EOSINOPHILS 4.90 12/10/2017 04:23 AM    BASOPHILS 1.10 12/10/2017 04:23 AM    HYPOCHROMIA 1+ 07/26/2017 07:10 AM    ANISOCYTOSIS 1+ 07/27/2017 05:26 AM      Lab Results   Component Value Date/Time    SODIUM 134 (L) 12/10/2017 04:23 AM    POTASSIUM 4.7 12/10/2017 04:23 AM    CHLORIDE 99 12/10/2017 04:23 AM    CO2 23 12/10/2017 04:23 AM    GLUCOSE 98 12/10/2017 04:23 AM    BUN 63 (H) 12/10/2017 04:23 AM    CREATININE 8.58 (HH) 12/10/2017 04:23 AM     CREATININE 0.9 12/13/2008 12:05 AM      Lab Results   Component Value Date/Time    ALTSGPT 6 12/10/2017 04:23 AM    ASTSGOT 9 (L) 12/10/2017 04:23 AM    ALKPHOSPHAT 77 12/10/2017 04:23 AM    TBILIRUBIN 0.3 12/10/2017 04:23 AM    LIPASE 23 08/11/2017 05:45 PM    ALBUMIN 3.5 12/10/2017 04:23 AM    GLOBULIN 2.4 12/10/2017 04:23 AM    PREALBUMIN 11.0 (L) 01/25/2016 06:23 AM    INR 1.01 12/07/2017 01:40 AM    MACROCYTOSIS 1+ 07/27/2017 05:26 AM     Lab Results   Component Value Date/Time    PROTHROMBTM 13.0 12/07/2017 01:40 AM    INR 1.01 12/07/2017 01:40 AM

## 2017-12-14 NOTE — DOCUMENTATION QUERY
DOCUMENTATION QUERY      To better represent the severity of illness of your patient, please review the following information and exercise your independent professional judgment in responding to this query.     Per 11/21/17 Operative Report, Alex catheter thrombectomy was performed on the right arm brachiobasilic AV graft. The thrombectomy sites must be specified in the operative details in order to be coded. Based upon the clinical findings, risk factors, and treatment, can these sites be specified?       Please select ALL sites of thrombectomy:     • Right axilla artery  • Right axilla vein  • Right basilic artery  • Right basilic vein  • Right brachial artery  • Right brachial vein    • Other explanation of clinical findings (please document)  • Unable to determine      The medical record reflects the following:    Clinical Findings   DATE OF SERVICE: 11/21/2017    POSTOPERATIVE DIAGNOSIS: Stage V kidney disease with thrombosed right brachiobasilic AV graft.    OPERATIONS:    3. Mechanical AngioJet thrombectomy, right arm AV graft.  4. Alex catheter thrombectomy, brachial arterial anastomosis, AV graft; thrombectomy axillary and proximal basilic vein.  4. Balloon angioplasty, venous outflow, 8x40 mm [place]. Balloon angioplasty AV graft 7x45 [place]. Balloon angioplasty, arterial anastomosis, 5x40 [place] balloon.    DESCRIPTION OF PROCEDURE:  The patient was brought to the angiography room. She was positioned supine on the table with her right arm outstretched.  A time-out was performed.  She had given informed consent.  She was given moderate conscious sedation and remained stable through the procedure.  There was no thrill, no pulse in the fistula.  I looked at it with ultrasound and lot was present and much of it looked fresh.  I placed a micropuncture   needle, wire, and dilator in the proximal arm directed toward the wrist.  I put a second one at the distal biceps region, directed toward the axilla.   Through the medially located 6-[place] sheath that I placed, I ran a Glidewire and then the AngioJet did pulse spray of approximately 40 mL of solution.  We then placed the second 5 cm long sheath directed toward the axilla.  Through this, we passed the AngioJet, but it has some mechanical difficulty and not   all of the TPA was administered.  I took a #3 Alex and passed it through the brachial graft anastomosis approximately 4 or 5 times to open up this brachial artery and graft area the best I could.  I did some imaging with contrast showing a tight outflow stenosis at the axilla.  The AngioJet was run through the graft from both ports several times to remove clot from the graft and the axillary vein and proximal basilic vein at the anastomosis.  It dilated the 7 mm balloon throughout the graft, the 5 mm balloon at the arterial anastomosis.  We did a central venogram showing rapid flow with no stenosis of the axillary, subclavian, or innominate veins.  A thrill developed.  We removed the sheaths and sutured the sheath sites with 5-0 Prolene.  Blood loss was 50 mL.  Counts were reported   to be correct.       Treatment   Alex catheter thrombectomy, arterial anastomosis, AV graft, Balloon angioplasty, venous outflow, 8x40 mm [place]. Balloon angioplasty AV graft 7x45 [place]. Balloon angioplasty, arterial anastomosis, 5x40 [place] balloon. Angiojet thrombectomy axillary vein and proximal basilic vein at venous anastomosis.   Risk Factors   ESRD with thrombosed right brachiobasilic AV graft.        Location within medical record    Operative Report       Thank you,    [email or web address]

## 2018-01-18 ENCOUNTER — PATIENT OUTREACH (OUTPATIENT)
Dept: HEALTH INFORMATION MANAGEMENT | Facility: OTHER | Age: 38
End: 2018-01-18

## 2018-01-24 NOTE — PROGRESS NOTES
Patient Debby Carrizales discharged on 11/24/17 and [facility] Patient Advocate attempted to assist with discharge orders, however, after placing several calls to the patient and reaching out to her emergency contact, this Patient Advocate never received a return call and the patient or emergency contact and subsequently returned to [facility] on 12/7/17. She discharged on 12/10/17.  Patient Advocate was able to reach this patient once after her readmission.  The patient declined assistance with scheduling her follow up appointments.  The patient resumed dialysis on 12/11/17 and her discharge medication was successfully filled.  The patient has no future appointments scheduled.

## 2018-02-01 ENCOUNTER — HOSPITAL ENCOUNTER (EMERGENCY)
Facility: MEDICAL CENTER | Age: 38
End: 2018-02-01
Attending: EMERGENCY MEDICINE
Payer: MEDICARE

## 2018-02-01 VITALS
SYSTOLIC BLOOD PRESSURE: 151 MMHG | DIASTOLIC BLOOD PRESSURE: 94 MMHG | RESPIRATION RATE: 18 BRPM | OXYGEN SATURATION: 92 % | WEIGHT: 188.71 LBS | HEIGHT: 65 IN | HEART RATE: 70 BPM | BODY MASS INDEX: 31.44 KG/M2 | TEMPERATURE: 98.8 F

## 2018-02-01 DIAGNOSIS — M54.50 ACUTE BILATERAL LOW BACK PAIN WITHOUT SCIATICA: ICD-10-CM

## 2018-02-01 DIAGNOSIS — N18.6 ESRD (END STAGE RENAL DISEASE) ON DIALYSIS (HCC): ICD-10-CM

## 2018-02-01 DIAGNOSIS — Z99.2 ESRD (END STAGE RENAL DISEASE) ON DIALYSIS (HCC): ICD-10-CM

## 2018-02-01 LAB
ALBUMIN SERPL BCP-MCNC: 4.3 G/DL (ref 3.2–4.9)
ALBUMIN/GLOB SERPL: 1.8 G/DL
ALP SERPL-CCNC: 64 U/L (ref 30–99)
ALT SERPL-CCNC: 6 U/L (ref 2–50)
ANION GAP SERPL CALC-SCNC: 10 MMOL/L (ref 0–11.9)
APPEARANCE UR: CLEAR
AST SERPL-CCNC: 11 U/L (ref 12–45)
BACTERIA #/AREA URNS HPF: ABNORMAL /HPF
BASOPHILS # BLD AUTO: 1.1 % (ref 0–1.8)
BASOPHILS # BLD: 0.07 K/UL (ref 0–0.12)
BILIRUB SERPL-MCNC: 0.5 MG/DL (ref 0.1–1.5)
BILIRUB UR QL STRIP.AUTO: NEGATIVE
BUN SERPL-MCNC: 37 MG/DL (ref 8–22)
CALCIUM SERPL-MCNC: 9.5 MG/DL (ref 8.5–10.5)
CHLORIDE SERPL-SCNC: 102 MMOL/L (ref 96–112)
CO2 SERPL-SCNC: 25 MMOL/L (ref 20–33)
COLOR UR: YELLOW
CREAT SERPL-MCNC: 6.11 MG/DL (ref 0.5–1.4)
EOSINOPHIL # BLD AUTO: 0.06 K/UL (ref 0–0.51)
EOSINOPHIL NFR BLD: 0.9 % (ref 0–6.9)
EPI CELLS #/AREA URNS HPF: ABNORMAL /HPF
ERYTHROCYTE [DISTWIDTH] IN BLOOD BY AUTOMATED COUNT: 41.6 FL (ref 35.9–50)
FLUAV RNA SPEC QL NAA+PROBE: NEGATIVE
FLUBV RNA SPEC QL NAA+PROBE: NEGATIVE
GLOBULIN SER CALC-MCNC: 2.4 G/DL (ref 1.9–3.5)
GLUCOSE SERPL-MCNC: 84 MG/DL (ref 65–99)
GLUCOSE UR STRIP.AUTO-MCNC: 100 MG/DL
HCT VFR BLD AUTO: 38.6 % (ref 37–47)
HGB BLD-MCNC: 13.2 G/DL (ref 12–16)
HYALINE CASTS #/AREA URNS LPF: ABNORMAL /LPF
IMM GRANULOCYTES # BLD AUTO: 0.06 K/UL (ref 0–0.11)
IMM GRANULOCYTES NFR BLD AUTO: 0.9 % (ref 0–0.9)
KETONES UR STRIP.AUTO-MCNC: NEGATIVE MG/DL
LEUKOCYTE ESTERASE UR QL STRIP.AUTO: ABNORMAL
LYMPHOCYTES # BLD AUTO: 1.92 K/UL (ref 1–4.8)
LYMPHOCYTES NFR BLD: 29.3 % (ref 22–41)
MCH RBC QN AUTO: 32.1 PG (ref 27–33)
MCHC RBC AUTO-ENTMCNC: 34.2 G/DL (ref 33.6–35)
MCV RBC AUTO: 93.9 FL (ref 81.4–97.8)
MICRO URNS: ABNORMAL
MONOCYTES # BLD AUTO: 0.8 K/UL (ref 0–0.85)
MONOCYTES NFR BLD AUTO: 12.2 % (ref 0–13.4)
NEUTROPHILS # BLD AUTO: 3.65 K/UL (ref 2–7.15)
NEUTROPHILS NFR BLD: 55.6 % (ref 44–72)
NITRITE UR QL STRIP.AUTO: NEGATIVE
NRBC # BLD AUTO: 0 K/UL
NRBC BLD-RTO: 0 /100 WBC
PH UR STRIP.AUTO: >=9 [PH]
PLATELET # BLD AUTO: 153 K/UL (ref 164–446)
PMV BLD AUTO: 10.7 FL (ref 9–12.9)
POTASSIUM SERPL-SCNC: 4.3 MMOL/L (ref 3.6–5.5)
PROT SERPL-MCNC: 6.7 G/DL (ref 6–8.2)
PROT UR QL STRIP: 300 MG/DL
RBC # BLD AUTO: 4.11 M/UL (ref 4.2–5.4)
RBC # URNS HPF: ABNORMAL /HPF
RBC UR QL AUTO: NEGATIVE
SODIUM SERPL-SCNC: 137 MMOL/L (ref 135–145)
SP GR UR STRIP.AUTO: 1.01
UROBILINOGEN UR STRIP.AUTO-MCNC: 0.2 MG/DL
WBC # BLD AUTO: 6.6 K/UL (ref 4.8–10.8)
WBC #/AREA URNS HPF: ABNORMAL /HPF

## 2018-02-01 PROCEDURE — 99284 EMERGENCY DEPT VISIT MOD MDM: CPT

## 2018-02-01 PROCEDURE — 96372 THER/PROPH/DIAG INJ SC/IM: CPT

## 2018-02-01 PROCEDURE — 85025 COMPLETE CBC W/AUTO DIFF WBC: CPT

## 2018-02-01 PROCEDURE — 81001 URINALYSIS AUTO W/SCOPE: CPT

## 2018-02-01 PROCEDURE — 80053 COMPREHEN METABOLIC PANEL: CPT

## 2018-02-01 PROCEDURE — 87502 INFLUENZA DNA AMP PROBE: CPT

## 2018-02-01 PROCEDURE — 36415 COLL VENOUS BLD VENIPUNCTURE: CPT

## 2018-02-01 PROCEDURE — 700111 HCHG RX REV CODE 636 W/ 250 OVERRIDE (IP): Performed by: EMERGENCY MEDICINE

## 2018-02-01 RX ORDER — HYDROMORPHONE HYDROCHLORIDE 2 MG/ML
1 INJECTION, SOLUTION INTRAMUSCULAR; INTRAVENOUS; SUBCUTANEOUS ONCE
Status: COMPLETED | OUTPATIENT
Start: 2018-02-01 | End: 2018-02-01

## 2018-02-01 RX ORDER — ONDANSETRON 4 MG/1
4 TABLET, ORALLY DISINTEGRATING ORAL ONCE
Status: COMPLETED | OUTPATIENT
Start: 2018-02-01 | End: 2018-02-01

## 2018-02-01 RX ADMIN — ONDANSETRON 4 MG: 4 TABLET, ORALLY DISINTEGRATING ORAL at 23:11

## 2018-02-01 RX ADMIN — HYDROMORPHONE HYDROCHLORIDE 1 MG: 2 INJECTION, SOLUTION INTRAMUSCULAR; INTRAVENOUS; SUBCUTANEOUS at 23:11

## 2018-02-02 ENCOUNTER — PATIENT OUTREACH (OUTPATIENT)
Dept: HEALTH INFORMATION MANAGEMENT | Facility: OTHER | Age: 38
End: 2018-02-02

## 2018-02-02 NOTE — ED TRIAGE NOTES
".  Chief Complaint   Patient presents with   • Head Ache       Amb to triage, Reports freq  headaches, bilat \"kidney pain\",  Worried about having a Lupus flare. Pt stage 4 kidney failure, on dialysis, \"I'm just not doing well\".  Reports fevers at home, 102, taking tylenol.   • Flank Pain       "

## 2018-02-02 NOTE — ED PROVIDER NOTES
"ED Provider Note    Scribed for Bam Peterson M.D. by Barb Meza. 2/1/2018, 10:46 PM.    Primary care provider: Sushila Manzano M.D.  Means of arrival: walk-in  History obtained from: Patient  History limited by: none    CHIEF COMPLAINT  Chief Complaint   Patient presents with   • Head Ache       Amb to triage, Reports freq  headaches, bilat \"kidney pain\",  Worried about having a Lupus flare. Pt stage 4 kidney failure, on dialysis, \"I'm just not doing well\".  Reports fevers at home, 102, taking tylenol.   • Flank Pain       HPI  Debby Carrizales is a 37 y.o. Female with a history of Lupus, hypertension, fibromyalgia, renal failure who presents to the Emergency Department for bilateral flank pain onset 1 week ago with associated dysuria, intermittent subjective fevers, nausea, vomiting, headache. Patient has experienced severe headaches such as this in the past, undergoing several spinal taps for evaluation and to relieve pressure from fluid build up. Patient has a history of lupus, and relates this general body discomfort to past flair ups. She is also a dialysis patient, last appointment yesterday, and still produces urine, however, has been experiencing decreased urine output since flank pain began.    Patient also reports abnormal intermittent swelling and drainage from her right arm fistula site that has been occurring over the last few weeks.  No complaints of chest pain, shortness of breath, abdominal pain.    REVIEW OF SYSTEMS  See HPI,  Negative for chest pain, shortness of breath, abdominal pain Remainder of ROS negative.     PAST MEDICAL HISTORY   has a past medical history of Arthritis; Avascular necrosis of bones of both hips (HCC) (10/10/2016); Clostridium difficile colitis (5/3/2011); Dialysis patient; ESBL (extended spectrum beta-lactamase) producing bacteria infection (8/25/2014); Fibromyalgia; Hypertension; Lupus; Pneumonia (); Psychiatric disorder; and Renal " "failure.    SURGICAL HISTORY   has a past surgical history that includes gastroscopy-endo (4/10/2011); sclerotheraphy (4/10/2011); gastroscopy-endo (4/18/2011); colonoscopy with biopsy (4/20/2011); gastroscopy-endo (4/27/2011); other (5/2011); other abdominal surgery; av fistula creation (9/9/2014); cath placement (9/9/2014); av fistula creation (11/14/2014); av fistula creation (2/3/2015); hip arthroplasty total (Right, 1/18/2016); closed reduction (Right, 7/5/2016); av fistula creation (Right, 7/12/2016); thrombectomy (Right, 8/20/2016); thrombectomy (Right, 8/21/2016); angioplasty balloon (8/21/2016); tracheostomy; av fistula creation (Right, 12/9/2017); and thrombectomy (12/9/2017).    SOCIAL HISTORY  Social History   Substance Use Topics   • Smoking status: Former Smoker     Packs/day: 0.50     Years: 18.00     Types: Cigarettes     Quit date: 6/13/2011   • Smokeless tobacco: Never Used      Comment: 1/2 ppd   • Alcohol use No      Comment: occ      History   Drug Use No       FAMILY HISTORY  No pertinent family history    CURRENT MEDICATIONS  Reviewed.  See Encounter Summary.     ALLERGIES  Allergies   Allergen Reactions   • Ativan Anxiety     Patient becomes severely paranoid and agitated   • Morphine Itching     Tolerates Dilaudid   • Seasonal Runny Nose and Itching     Hay fever, sabiha brush       PHYSICAL EXAM  VITAL SIGNS: /94   Pulse 70   Temp 37.1 °C (98.8 °F)   Resp 16   Ht 1.651 m (5' 5\")   Wt 85.6 kg (188 lb 11.4 oz)   SpO2 97%   BMI 31.40 kg/m²      Constitutional: Awake, alert in no apparent distress.  HENT: Normocephalic, Bilateral external ears normal. Nose normal.   Eyes: Conjunctiva normal, non-icteric, EOMI.    Thorax & Lungs: Easy unlabored respirations, Clear to ascultation bilaterally.  Cardiovascular: Regular rate, Regular rhythm, No murmurs, rubs or gallops.  Abdomen:  Soft, nontender, no masses   :  Bilateral flank tenderness, no midline tenderness.   Skin: Visualized skin " is  Dry, No erythema, No rash.   Extremities:   No cyanosis, clubbing or edema.  Neurologic: Alert, Grossly non-focal.   Psychiatric: Depressed affect, Normal mood    DIAGNOSTIC STUDIES / PROCEDURES     COURSE & MEDICAL DECISION MAKING  Pertinent Labs & Imaging studies reviewed. (See chart for details)    10:46 PM - Patient seen and examined at bedside. Patient will be treated with 1mg IMDilaudid and 4mg IM Zofran. Ordered UA, urine microscopic, CBC, CMP, influenza by PCR to evaluate her symptoms. I informed the patient that her labs have returned relatively normal. I explained that this is very possibly an exacerbation of her existing medical conditions, and nothing emergent at this time. I explained that I would treat her discomfort and she would be discharged home. Patient understands and agrees.    Decision Making:  This is a pleasant 37 y.o. year old female who presents with generalized aches and pains, predominantly in the bilateral flanks. She believes that she has a urinary tract infection. She also reports headaches. With regards to the headache, she is afebrile, labs are reassuring, she does not have a nuchal rigidity or photophobia. I do not suspect acute bacterial meningitis. She denies any history of thunderclap headache, I do not suspect subarachnoid hemorrhage.    For the back pain, this is chronic. Urinalysis is unremarkable, there is few bacteria and many epithelial cells, most consistent with a contaminated catch. Again, labs are reassuring. She does not have any severe electrolyte derangement, she does not have leukocytosis. Influenza is negative as well.    In summary, this is a 37-year-old female who unfortunately has lupus and end-stage renal disease with viral myalgia and chronic pain who presents with diffuse body aches and generalized malaise. No emergent process is been identified. I do not feel that a CT of the abdomen and pelvis is indicated given the reassuring labs and benign  examination. Her pain is not midline, do not suspect aortic dissection, epidural abscess or epidural hematoma. I do not suspect obstructive nephrolithiasis, particularly given the bilateral nature of the pain and lack of hematuria.    I explained the rational patient, I recommend she follow-up with her primary care physician. She should return for any severe pain, confusion, severe headache, numbness, weakness or any other concern.    DISPOSITION:  Patient will be discharged home in good condition.    The patient was discharged home (see d/c instructions) and told to return immediately for any signs or symptoms listed, or any worsening at all.  The patient verbally agreed to the discharge precautions and follow-up plan which is documented in EPIC.      FINAL IMPRESSION  1. Acute bilateral low back pain without sciatica    2. ESRD (end stage renal disease) on dialysis (CMS-[facility])          Barb BARGER (Scribe), am scribing for, and in the presence of, Bam Peterson M.D..    Electronically signed by: Barb Meza (Scribe), 2/1/2018    Bam BARGER M.D. personally performed the services described in this documentation, as scribed by Barb Meza in my presence, and it is both accurate and complete.    The note accurately reflects work and decisions made by me.  Bam Peterson  2/2/2018  4:12 AM

## 2018-02-02 NOTE — DISCHARGE INSTRUCTIONS
Back Exercises  Back exercises help treat and prevent back injuries. The goal of back exercises is to increase the strength of your abdominal and back muscles and the flexibility of your back. These exercises should be started when you no longer have back pain. Back exercises include:  · Pelvic Tilt. Lie on your back with your knees bent. Tilt your pelvis until the lower part of your back is against the floor. Hold this position 5 to 10 sec and repeat 5 to 10 times.  · Knee to Chest. Pull first 1 knee up against your chest and hold for 20 to 30 seconds, repeat this with the other knee, and then both knees. This may be done with the other leg straight or bent, whichever feels better.  · Sit-Ups or Curl-Ups. Bend your knees 90 degrees. Start with tilting your pelvis, and do a partial, slow sit-up, lifting your trunk only 30 to 45 degrees off the floor. Take at least 2 to 3 seconds for each sit-up. Do not do sit-ups with your knees out straight. If partial sit-ups are difficult, simply do the above but with only tightening your abdominal muscles and holding it as directed.  · Hip-Lift. Lie on your back with your knees flexed 90 degrees. Push down with your feet and shoulders as you raise your hips a couple inches off the floor; hold for 10 seconds, repeat 5 to 10 times.  · Back arches. Lie on your stomach, propping yourself up on bent elbows. Slowly press on your hands, causing an arch in your low back. Repeat 3 to 5 times. Any initial stiffness and discomfort should lessen with repetition over time.  · Shoulder-Lifts. Lie face down with arms beside your body. Keep hips and torso pressed to floor as you slowly lift your head and shoulders off the floor.  Do not overdo your exercises, especially in the beginning. Exercises may cause you some mild back discomfort which lasts for a few minutes; however, if the pain is more severe, or lasts for more than 15 minutes, do not continue exercises until you see your caregiver.  Improvement with exercise therapy for back problems is slow.   See your caregivers for assistance with developing a proper back exercise program.     This information is not intended to replace advice given to you by your health care provider. Make sure you discuss any questions you have with your health care provider.     Document Released: 01/25/2006 Document Revised: 03/11/2013 Document Reviewed: 02/11/2016  [employer] Interactive Patient Education ©2016 [employer]

## 2018-02-09 ENCOUNTER — OFFICE VISIT (OUTPATIENT)
Dept: MEDICAL GROUP | Facility: MEDICAL CENTER | Age: 38
End: 2018-02-09
Payer: MEDICARE

## 2018-02-09 VITALS
OXYGEN SATURATION: 98 % | TEMPERATURE: 98.1 F | HEART RATE: 74 BPM | DIASTOLIC BLOOD PRESSURE: 78 MMHG | WEIGHT: 194 LBS | RESPIRATION RATE: 14 BRPM | HEIGHT: 65 IN | BODY MASS INDEX: 32.32 KG/M2 | SYSTOLIC BLOOD PRESSURE: 122 MMHG

## 2018-02-09 DIAGNOSIS — E66.9 OBESITY (BMI 30-39.9): ICD-10-CM

## 2018-02-09 DIAGNOSIS — I77.0 A-V FISTULA (HCC): ICD-10-CM

## 2018-02-09 DIAGNOSIS — N18.6 ESRD (END STAGE RENAL DISEASE) ON DIALYSIS (HCC): ICD-10-CM

## 2018-02-09 DIAGNOSIS — M87.052 AVASCULAR NECROSIS OF BONES OF BOTH HIPS (HCC): ICD-10-CM

## 2018-02-09 DIAGNOSIS — F11.20 NARCOTIC DEPENDENCE (HCC): ICD-10-CM

## 2018-02-09 DIAGNOSIS — M87.051 AVASCULAR NECROSIS OF BONES OF BOTH HIPS (HCC): ICD-10-CM

## 2018-02-09 DIAGNOSIS — N28.1 RENAL CYST: ICD-10-CM

## 2018-02-09 DIAGNOSIS — Z13.6 SCREENING FOR CARDIOVASCULAR CONDITION: ICD-10-CM

## 2018-02-09 DIAGNOSIS — F41.9 ANXIETY: ICD-10-CM

## 2018-02-09 DIAGNOSIS — R00.2 PALPITATIONS: ICD-10-CM

## 2018-02-09 DIAGNOSIS — Z96.641 STATUS POST RIGHT HIP REPLACEMENT: ICD-10-CM

## 2018-02-09 DIAGNOSIS — Z99.2 ESRD (END STAGE RENAL DISEASE) ON DIALYSIS (HCC): ICD-10-CM

## 2018-02-09 DIAGNOSIS — R07.9 CHEST PAIN, UNSPECIFIED TYPE: ICD-10-CM

## 2018-02-09 DIAGNOSIS — M32.14: ICD-10-CM

## 2018-02-09 PROBLEM — T82.898A AV FISTULA OCCLUSION (HCC): Status: RESOLVED | Noted: 2017-12-07 | Resolved: 2018-02-09

## 2018-02-09 PROBLEM — R79.89 ELEVATED TROPONIN: Status: RESOLVED | Noted: 2017-11-21 | Resolved: 2018-02-09

## 2018-02-09 PROBLEM — M87.00 AVASCULAR NECROSIS (HCC): Status: RESOLVED | Noted: 2017-11-21 | Resolved: 2018-02-09

## 2018-02-09 PROBLEM — R11.2 NAUSEA & VOMITING: Status: RESOLVED | Noted: 2017-11-21 | Resolved: 2018-02-09

## 2018-02-09 PROBLEM — N12 PYELONEPHRITIS: Status: RESOLVED | Noted: 2017-11-21 | Resolved: 2018-02-09

## 2018-02-09 PROCEDURE — 99214 OFFICE O/P EST MOD 30 MIN: CPT | Performed by: FAMILY MEDICINE

## 2018-02-09 RX ORDER — HYDROXYZINE 50 MG/1
50 TABLET, FILM COATED ORAL 3 TIMES DAILY PRN
Qty: 90 TAB | Refills: 0 | Status: SHIPPED | OUTPATIENT
Start: 2018-02-09 | End: 2018-06-07

## 2018-02-09 ASSESSMENT — PATIENT HEALTH QUESTIONNAIRE - PHQ9: CLINICAL INTERPRETATION OF PHQ2 SCORE: 0

## 2018-02-09 NOTE — PROGRESS NOTES
cc: Hip pain    Subjective:     Debby Carrizales is a 37 y.o. female presenting for:    1. Hip pain: Has a history of avascular necrosis bilaterally. She had a right hip replaced in 2016. She now complains of worsening left hip pain. Her last x-ray July 2016 showed early avascular necrosis of the left hip. She feels like she is now ready for hip surgery on the left side.    2. Chest pain: She continues to describe constant chest pain that is quite dull and achy. It will occasionally become very sharp with deep breaths. Pain is located mostly on the left side, but can occur on the right side as well. Nothing makes it better. Has tried nothing. She's been to the ER for this and workup there was unremarkable. She occasionally feels like her heart is racing inside her chest, with lightheadedness, dizziness and headaches. Her blood pressures are quite volatile due to dialysis. She occasionally will take amlodipine if her blood pressures are elevated.    3. Lupus:  Sees rheum (Dr Collado) for the lupus and fibromyalgia, is prescribed lyrica, lidoderm patches, oxycodone, phenergan, plaquenil, trazodone, tizanidine, and wellbutrin by her rheumatologist.   she had a referral to pain management, but has not established yet. She would like their information again to schedule an appointment. She has labs to do for her rheumatologist. She also plans to get the influenza and pneumonia vaccine after she does lab work.    4. ESRD on hemodialysis: Goes to hemodialysis MWF from lupus.  Sees nephro (Dr Najjar), is prescribed renvela, sensipar, vit d, norvasc.  She has a graft on her right arm for access.  Only takes norvasc if needed.  BPs tend to vary depending on dialysis and how her chronic pain is managed.  She is somewhat worried about her kidneys. She feels like her kidney cysts have been rupturing due to sharp pains in the kidney area. She would like it rechecked. Denies any urinary symptoms, fevers, nausea,  vomiting      Review of systems:  See above.         Current Outpatient Prescriptions:   •  Sevelamer Carbonate (RENVELA PO), Take  by mouth., Disp: , Rfl:   •  Cinacalcet HCl (SENSIPAR PO), Take  by mouth., Disp: , Rfl:   •  hydrOXYzine HCl (ATARAX) 50 MG Tab, Take 1 Tab by mouth 3 times a day as needed for Anxiety., Disp: 90 Tab, Rfl: 0  •  Oxycodone HCl 20 MG Tab, Take 1 Tab by mouth every 6 hours as needed., Disp: 12 Tab, Rfl: 0  •  Diclofenac Sodium (VOLTAREN) 1 % Gel, Apply 1 Application to skin as directed as needed (For right leg)., Disp: , Rfl:   •  lidocaine (LIDODERM) 5 % Patch, Apply 1 Patch to skin as directed as needed (For back pain)., Disp: , Rfl:   •  ferrous sulfate 325 (65 FE) MG tablet, Take 325 mg by mouth every day., Disp: , Rfl:   •  amlodipine (NORVASC) 5 MG Tab, Take 5 mg by mouth as needed (Only if BP get 160)., Disp: , Rfl:   •  ascorbic acid (ASCORBIC ACID) 500 MG Tab, Take 500 mg by mouth every day., Disp: , Rfl:   •  buPROPion (WELLBUTRIN XL) 150 MG XL tablet, Take 150 mg by mouth every morning., Disp: , Rfl:   •  pregabalin (LYRICA) 150 MG Cap, Take 150 mg by mouth 3 times a day., Disp: , Rfl:   •  promethazine (PHENERGAN) 25 MG Tab, Take 25 mg by mouth every 6 hours as needed for Nausea/Vomiting., Disp: , Rfl:   •  hydroxychloroquine (PLAQUENIL) 200 MG Tab, Take 200 mg by mouth every bedtime., Disp: , Rfl:   •  cholecalciferol (VITAMIN D3) 5000 UNIT Cap, Take 10,000 Units by mouth every day., Disp: , Rfl:   •  trazodone (DESYREL) 50 MG Tab, Take 1 Tab by mouth every bedtime., Disp: 30 Tab, Rfl: 0  •  tizanidine (ZANAFLEX) 4 MG Tab, Take 2 Tabs by mouth every bedtime., Disp: 60 Tab, Rfl: 0    Allergies   Allergen Reactions   • Ativan Anxiety     Patient becomes severely paranoid and agitated   • Morphine Itching     Tolerates Dilaudid   • Seasonal Runny Nose and Itching     Hay fever, sabiha brush       Past Medical History:   Diagnosis Date   • Arthritis     all joints,r/t lupus   •  Avascular necrosis of bones of both hips (HCC) 10/10/2016   • Clostridium difficile colitis 5/3/2011   • Dialysis patient    • ESBL (extended spectrum beta-lactamase) producing bacteria infection 8/25/2014   • Fibromyalgia    • Hypertension    • Lupus    • Pneumonia    • Psychiatric disorder     anxiety, depression   • Pyelonephritis 11/21/2017   • Renal failure      Past Surgical History:   Procedure Laterality Date   • AV FISTULA CREATION Right 12/9/2017    Procedure: AV FISTULA CREATION-ARM FOR GRAFT;  Surgeon: Lesly Jansen M.D.;  Location: [facility];  Service: General   • THROMBECTOMY  12/9/2017    Procedure: THROMBECTOMY;  Surgeon: Lesly Jansen M.D.;  Location: [facility];  Service: General   • THROMBECTOMY Right 8/21/2016    Procedure: THROMBECTOMY - right AV fistula graft with grams;  Surgeon: Shabbir Ardon M.D.;  Location: [facility];  Service:    • ANGIOPLASTY BALLOON  8/21/2016    Procedure: ANGIOPLASTY BALLOON;  Surgeon: Shabbir Ardon M.D.;  Location: [facility];  Service:    • THROMBECTOMY Right 8/20/2016    Procedure: THROMBECTOMY AV GRAFT;  Surgeon: Shabbir Ardon M.D.;  Location: [facility];  Service:    • AV FISTULA CREATION Right 7/12/2016    Procedure: AV FISTULA CREATION WITH GRAFT BRACHIAL AXILLARY;  Surgeon: Shabbir Ardon M.D.;  Location: [facility];  Service:    • CLOSED REDUCTION Right 7/5/2016    Procedure: CLOSED REDUCTION- Hip ;  Surgeon: Michael Holman M.D.;  Location: [facility];  Service:    • HIP ARTHROPLASTY TOTAL Right 1/18/2016    Procedure: HIP ARTHROPLASTY TOTAL;  Surgeon: Michael Holman M.D.;  Location: [facility];  Service:    • AV FISTULA CREATION  2/3/2015    Performed by Shabbir Ardon M.D. at [facility]   • AV FISTULA CREATION  11/14/2014    Performed by Shabbir Ardon M.D. at [facility]   • AV FISTULA  "CREATION  9/9/2014    Performed by Shabbir Ardon M.D. at SURGERY [facility]   • CATH PLACEMENT  9/9/2014    Performed by Shabbir Ardon M.D. at SURGERY [facility]   • OTHER  5/2011    tracheostomy   • GASTROSCOPY-ENDO  4/27/2011    Performed by PALMIRA DOAN at ENDOSCOPY [facility]S   • COLONOSCOPY WITH BIOPSY  4/20/2011    Performed by ALCIRA CRUZ at ENDOSCOPY [facility]S   • GASTROSCOPY-ENDO  4/18/2011    Performed by ALCIRA CRUZ at ENDOSCOPY [facility]   • GASTROSCOPY-ENDO  4/10/2011    Performed by SYED JURADO at ENDOSCOPY [facility]S   • SCLEROTHERAPHY  4/10/2011    Performed by SYED JURADO at ENDOSCOPY [facility]   • OTHER ABDOMINAL SURGERY      kidney biopsy   • TRACHEOSTOMY       History reviewed. No pertinent family history.  Social History     Social History   • Marital status: Single     Spouse name: N/A   • Number of children: N/A   • Years of education: N/A     Occupational History   • Not on file.     Social History Main Topics   • Smoking status: Former Smoker     Packs/day: 0.50     Years: 18.00     Types: Cigarettes     Quit date: 6/13/2011   • Smokeless tobacco: Never Used      Comment: 1/2 ppd   • Alcohol use No      Comment: occ   • Drug use: No   • Sexual activity: Not on file     Other Topics Concern   • Not on file     Social History Narrative   • No narrative on file       Objective:     Vitals: /78   Pulse 74   Temp 36.7 °C (98.1 °F)   Resp 14   Ht 1.651 m (5' 5\")   Wt 88 kg (194 lb)   SpO2 98%   BMI 32.28 kg/m²   General: Alert, pleasant, NAD  HEENT: Normocephalic.    Psych:  Affect/mood is normal, judgement is good, memory is intact, grooming is appropriate.    Assessment/Plan:     Diagnoses and all orders for this visit:    Avascular necrosis of bones of both hips (HCC)  Status post right hip replacement  ESRD (end stage renal disease) on dialysis (CMS-HCC)  -     REFERRAL TO ORTHOPEDICS    ESRD (end " stage renal disease) on dialysis (CMS-HCC)  Renal cyst  repeat CT scan for reassurance  -     CT-RENAL COLIC EVALUATION(A/P W/O); Future    Chest pain, unspecified type  A-V fistula (CMS-HCC)  Chronic lupus nephritis (CMS-HCC)  Palpitations  ESRD (end stage renal disease) on dialysis (CMS-HCC)  Echocardiogram ordered, referral to cardiology also placed given her complex medical issues  -     TSH WITH REFLEX TO FT4; Future  -     ECHOCARDIOGRAM COMP W/O CONT; Future  -     REFERRAL TO CARDIOLOGY    Screening for cardiovascular condition  -     LIPID PROFILE; Future    Obesity (BMI 30-39.9)  -     Patient identified as having weight management issue.  Appropriate orders and counseling given.    Narcotic dependence (CMS-HCC)  Information of her referral to pain management given to patient.    Anxiety  She reports having anxiety during dialysis. Will try higher dose of hydroxyzine. Would avoid benzos given her oxycodone use  -     hydrOXYzine HCl (ATARAX) 50 MG Tab; Take 1 Tab by mouth 3 times a day as needed for Anxiety.

## 2018-03-27 ENCOUNTER — HOSPITAL ENCOUNTER (EMERGENCY)
Facility: MEDICAL CENTER | Age: 38
End: 2018-03-27
Attending: EMERGENCY MEDICINE
Payer: MEDICARE

## 2018-03-27 VITALS
SYSTOLIC BLOOD PRESSURE: 190 MMHG | RESPIRATION RATE: 17 BRPM | WEIGHT: 200.18 LBS | HEART RATE: 84 BPM | BODY MASS INDEX: 33.35 KG/M2 | TEMPERATURE: 99.6 F | DIASTOLIC BLOOD PRESSURE: 100 MMHG | HEIGHT: 65 IN | OXYGEN SATURATION: 94 %

## 2018-03-27 DIAGNOSIS — R52 BODY ACHES: ICD-10-CM

## 2018-03-27 DIAGNOSIS — G89.4 CHRONIC PAIN SYNDROME: ICD-10-CM

## 2018-03-27 LAB
ANION GAP SERPL CALC-SCNC: 11 MMOL/L (ref 0–11.9)
APPEARANCE UR: CLEAR
BACTERIA #/AREA URNS HPF: ABNORMAL /HPF
BASOPHILS # BLD AUTO: 0.4 % (ref 0–1.8)
BASOPHILS # BLD: 0.03 K/UL (ref 0–0.12)
BILIRUB UR QL STRIP.AUTO: NEGATIVE
BUN SERPL-MCNC: 76 MG/DL (ref 8–22)
CALCIUM SERPL-MCNC: 7.3 MG/DL (ref 8.4–10.2)
CHLORIDE SERPL-SCNC: 107 MMOL/L (ref 96–112)
CO2 SERPL-SCNC: 18 MMOL/L (ref 20–33)
COLOR UR: YELLOW
CREAT SERPL-MCNC: 12.23 MG/DL (ref 0.5–1.4)
CRP SERPL HS-MCNC: 0.96 MG/DL (ref 0–0.75)
EOSINOPHIL # BLD AUTO: 0.13 K/UL (ref 0–0.51)
EOSINOPHIL NFR BLD: 1.9 % (ref 0–6.9)
EPI CELLS #/AREA URNS HPF: ABNORMAL /HPF
ERYTHROCYTE [DISTWIDTH] IN BLOOD BY AUTOMATED COUNT: 39.3 FL (ref 35.9–50)
ERYTHROCYTE [SEDIMENTATION RATE] IN BLOOD BY WESTERGREN METHOD: 33 MM/HOUR (ref 0–20)
GLUCOSE SERPL-MCNC: 86 MG/DL (ref 65–99)
GLUCOSE UR STRIP.AUTO-MCNC: 100 MG/DL
HCG UR QL: NEGATIVE
HCT VFR BLD AUTO: 33.9 % (ref 37–47)
HGB BLD-MCNC: 11.4 G/DL (ref 12–16)
IMM GRANULOCYTES # BLD AUTO: 0.02 K/UL (ref 0–0.11)
IMM GRANULOCYTES NFR BLD AUTO: 0.3 % (ref 0–0.9)
KETONES UR STRIP.AUTO-MCNC: NEGATIVE MG/DL
LEUKOCYTE ESTERASE UR QL STRIP.AUTO: NEGATIVE
LYMPHOCYTES # BLD AUTO: 1.54 K/UL (ref 1–4.8)
LYMPHOCYTES NFR BLD: 22.7 % (ref 22–41)
MCH RBC QN AUTO: 31.5 PG (ref 27–33)
MCHC RBC AUTO-ENTMCNC: 33.6 G/DL (ref 33.6–35)
MCV RBC AUTO: 93.6 FL (ref 81.4–97.8)
MICRO URNS: ABNORMAL
MONOCYTES # BLD AUTO: 0.6 K/UL (ref 0–0.85)
MONOCYTES NFR BLD AUTO: 8.8 % (ref 0–13.4)
NEUTROPHILS # BLD AUTO: 4.47 K/UL (ref 2–7.15)
NEUTROPHILS NFR BLD: 65.9 % (ref 44–72)
NITRITE UR QL STRIP.AUTO: NEGATIVE
NRBC # BLD AUTO: 0 K/UL
NRBC BLD-RTO: 0 /100 WBC
PH UR STRIP.AUTO: 8 [PH]
PLATELET # BLD AUTO: 114 K/UL (ref 164–446)
PMV BLD AUTO: 10.3 FL (ref 9–12.9)
POTASSIUM SERPL-SCNC: 5.1 MMOL/L (ref 3.6–5.5)
PROT UR QL STRIP: 100 MG/DL
RBC # BLD AUTO: 3.62 M/UL (ref 4.2–5.4)
RBC UR QL AUTO: ABNORMAL
SODIUM SERPL-SCNC: 136 MMOL/L (ref 135–145)
SP GR UR REFRACTOMETRY: 1.01
SP GR UR STRIP.AUTO: 1.01
WBC # BLD AUTO: 6.8 K/UL (ref 4.8–10.8)
WBC #/AREA URNS HPF: ABNORMAL /HPF

## 2018-03-27 PROCEDURE — 80048 BASIC METABOLIC PNL TOTAL CA: CPT

## 2018-03-27 PROCEDURE — 99284 EMERGENCY DEPT VISIT MOD MDM: CPT

## 2018-03-27 PROCEDURE — 81001 URINALYSIS AUTO W/SCOPE: CPT

## 2018-03-27 PROCEDURE — 700111 HCHG RX REV CODE 636 W/ 250 OVERRIDE (IP): Performed by: EMERGENCY MEDICINE

## 2018-03-27 PROCEDURE — 96375 TX/PRO/DX INJ NEW DRUG ADDON: CPT

## 2018-03-27 PROCEDURE — 86140 C-REACTIVE PROTEIN: CPT

## 2018-03-27 PROCEDURE — 87086 URINE CULTURE/COLONY COUNT: CPT

## 2018-03-27 PROCEDURE — 85652 RBC SED RATE AUTOMATED: CPT

## 2018-03-27 PROCEDURE — 700111 HCHG RX REV CODE 636 W/ 250 OVERRIDE (IP)

## 2018-03-27 PROCEDURE — 85025 COMPLETE CBC W/AUTO DIFF WBC: CPT

## 2018-03-27 PROCEDURE — 96374 THER/PROPH/DIAG INJ IV PUSH: CPT

## 2018-03-27 PROCEDURE — 81025 URINE PREGNANCY TEST: CPT

## 2018-03-27 RX ORDER — ONDANSETRON 2 MG/ML
4 INJECTION INTRAMUSCULAR; INTRAVENOUS ONCE
Status: COMPLETED | OUTPATIENT
Start: 2018-03-27 | End: 2018-03-27

## 2018-03-27 RX ORDER — SEVELAMER CARBONATE 800 MG/1
TABLET, FILM COATED ORAL SEE ADMIN INSTRUCTIONS
Status: SHIPPED | COMMUNITY
End: 2020-01-16

## 2018-03-27 RX ADMIN — HEPARIN 500 UNITS: 100 SYRINGE at 09:45

## 2018-03-27 RX ADMIN — ONDANSETRON 4 MG: 2 INJECTION INTRAMUSCULAR; INTRAVENOUS at 06:48

## 2018-03-27 RX ADMIN — HYDROMORPHONE HYDROCHLORIDE 1 MG: 1 INJECTION, SOLUTION INTRAMUSCULAR; INTRAVENOUS; SUBCUTANEOUS at 06:54

## 2018-03-27 ASSESSMENT — PAIN SCALES - GENERAL: PAINLEVEL_OUTOF10: 8

## 2018-03-27 NOTE — ED NOTES
Pt in bed states her friend dropped her off at ER because she has increased pain in her bilateral hips, and joint pain everywhere, causing difficulty ambulating and ROM of all joints for past 10 days. Pt states she has a history of lupus and may be having a flare up, and avascular necrosis of hips causing chronic hip pain. Because of her pain and inability to walk well, pt state she missed her dialysis appointment yesterday. Pt also c/o of fever as high as 103f which have occurred intermittantly for past 10 days.

## 2018-03-27 NOTE — ED NOTES
Med Rec completed per patient  Allergies reviewed  No ORAL antibiotics in last 30 days    Patient does not know about some of her medications, will call [facility] on [address] when they open

## 2018-03-27 NOTE — ED PROVIDER NOTES
ED Provider Note    CHIEF COMPLAINT  Chief Complaint   Patient presents with   • Generalized Body Aches       HPI  Dbeby Carrizales is a 37 y.o. female who presents to the emergency department with a chief complaint of whole body pain. The patient has a history of lupus and fibromyalgia. She has end-stage renal disease on dialysis. The patient says that feels that she might be having a lupus flare. Her whole-body feels painful. All of her joints hurt. Joints feel somewhat stiff. She has bilateral avascular necrosis of the hips status post total hip replacement on one side but she is waiting to have the other hip done. Her hip seems to be hurting more. She has not had a fever. She has had pain like this in the past but it just seems to be acting up today. She does have pain medication at home that she has been taking but it doesn't seem to be effective. No chest or abdominal pain.    REVIEW OF SYSTEMS  See HPI for further details. All other systems are negative.     PAST MEDICAL HISTORY  Past Medical History:   Diagnosis Date   • Arthritis     all joints,r/t lupus   • Avascular necrosis of bones of both hips (HCC) 10/10/2016   • Clostridium difficile colitis 5/3/2011   • Dialysis patient    • ESBL (extended spectrum beta-lactamase) producing bacteria infection 8/25/2014   • Fibromyalgia    • Hypertension    • Lupus    • Pneumonia    • Psychiatric disorder     anxiety, depression   • Pyelonephritis 11/21/2017   • Renal failure        FAMILY HISTORY  No family history on file.    SOCIAL HISTORY  Social History     Social History   • Marital status: Single     Spouse name: N/A   • Number of children: N/A   • Years of education: N/A     Social History Main Topics   • Smoking status: Former Smoker     Packs/day: 0.50     Years: 18.00     Types: Cigarettes     Quit date: 6/13/2011   • Smokeless tobacco: Never Used      Comment: 1/2 ppd   • Alcohol use No      Comment: occ   • Drug use: No   • Sexual  activity: Not on file     Other Topics Concern   • Not on file     Social History Narrative   • No narrative on file       SURGICAL HISTORY  Past Surgical History:   Procedure Laterality Date   • AV FISTULA CREATION Right 12/9/2017    Procedure: AV FISTULA CREATION-ARM FOR GRAFT;  Surgeon: Lesly Jansen M.D.;  Location: [facility];  Service: General   • THROMBECTOMY  12/9/2017    Procedure: THROMBECTOMY;  Surgeon: Lesly Jansen M.D.;  Location: [facility];  Service: General   • THROMBECTOMY Right 8/21/2016    Procedure: THROMBECTOMY - right AV fistula graft with grams;  Surgeon: Shabbir Ardon M.D.;  Location: [facility];  Service:    • ANGIOPLASTY BALLOON  8/21/2016    Procedure: ANGIOPLASTY BALLOON;  Surgeon: Shabbir Ardon M.D.;  Location: [facility];  Service:    • THROMBECTOMY Right 8/20/2016    Procedure: THROMBECTOMY AV GRAFT;  Surgeon: Shabbir Ardon M.D.;  Location: [facility];  Service:    • AV FISTULA CREATION Right 7/12/2016    Procedure: AV FISTULA CREATION WITH GRAFT BRACHIAL AXILLARY;  Surgeon: Shabbir Ardon M.D.;  Location: [facility];  Service:    • CLOSED REDUCTION Right 7/5/2016    Procedure: CLOSED REDUCTION- Hip ;  Surgeon: Michael Holman M.D.;  Location: [facility];  Service:    • HIP ARTHROPLASTY TOTAL Right 1/18/2016    Procedure: HIP ARTHROPLASTY TOTAL;  Surgeon: Michael Holman M.D.;  Location: [facility];  Service:    • AV FISTULA CREATION  2/3/2015    Performed by Shabbir Ardon M.D. at [facility]   • AV FISTULA CREATION  11/14/2014    Performed by Shabbir Ardon M.D. at [facility]   • AV FISTULA CREATION  9/9/2014    Performed by Shabbir Ardon M.D. at [facility]   • CATH PLACEMENT  9/9/2014    Performed by Shabbir Ardon M.D. at [facility]   • OTHER  5/2011    tracheostomy   • GASTROSCOPY-ENDO   "4/27/2011    Performed by PALMIRA DOAN at ENDOSCOPY [facility]   • COLONOSCOPY WITH BIOPSY  4/20/2011    Performed by ALCIRA CURZ at ENDOSCOPY [facility]   • GASTROSCOPY-ENDO  4/18/2011    Performed by ALCIRA CRUZ at ENDOSCOPY [facility]   • GASTROSCOPY-ENDO  4/10/2011    Performed by SYED JURADO at ENDOSCOPY [facility]   • SCLEROTHERAPHY  4/10/2011    Performed by SYED JURADO at ENDOSCOPY [facility]   • OTHER ABDOMINAL SURGERY      kidney biopsy   • TRACHEOSTOMY         CURRENT MEDICATIONS  Home Medications     Reviewed by Kasia Rojo (Pharmacy Tech) on 03/27/18 at 0731  Med List Status: Partial   Medication Last Dose Status   ascorbic acid (ASCORBIC ACID) 500 MG Tab 3/26/2018 Active   buPROPion (WELLBUTRIN XL) 150 MG XL tablet 3/26/2018 Active   cholecalciferol (VITAMIN D3) 5000 UNIT Cap 3/26/2018 Active   Cinacalcet HCl (SENSIPAR PO) 3/26/2018 Active   ferrous sulfate 325 (65 FE) MG tablet 3/26/2018 Active   hydroxychloroquine (PLAQUENIL) 200 MG Tab 3/26/2018 Active   hydrOXYzine HCl (ATARAX) 50 MG Tab 5 DAYS Active   lidocaine (LIDODERM) 5 % Patch 1 WEEK Active   Oxycodone HCl 20 MG Tab 3/26/2018 Active   pregabalin (LYRICA) 150 MG Cap 3/26/2018 Active   promethazine (PHENERGAN) 25 MG Tab 3/26/2018 Active   Sevelamer Carbonate (RENVELA PO) 3/26/2018 Active   sevelamer carbonate (RENVELA) 800 MG Tab tablet  Active   tizanidine (ZANAFLEX) 4 MG Tab 3/26/2018 Active   trazodone (DESYREL) 50 MG Tab 3/26/2018 Active                ALLERGIES  Allergies   Allergen Reactions   • Ativan Anxiety     Patient becomes severely paranoid and agitated   • Morphine Itching     Tolerates Dilaudid   • Seasonal Runny Nose and Itching     Hay fever, sabiha brush       PHYSICAL EXAM  VITAL SIGNS: BP (!) 190/100   Pulse 83   Temp 37.6 °C (99.6 °F)   Resp 16   Ht 1.651 m (5' 5\")   Wt 90.8 kg (200 lb 2.8 oz)   SpO2 93%   BMI 33.31 kg/m²   Constitutional: Well " developed, Well nourished, No acute distress, Non-toxic appearance.   HENT: Normocephalic, Atraumatic, Bilateral external ears normal, Oropharynx moist, No oral exudates, Nose normal.   Eyes: PERRLA, EOMI, Conjunctiva normal, No discharge.   Neck: Normal range of motion, No tenderness, Supple, No stridor.   Cardiovascular: Regular rate and rhythm. No murmurs.  Thorax & Lungs: Lungs are clear to auscultation bilaterally. No rales, rhonchi, or wheezing.  Abdomen: Bowel sounds normal, Soft, No tenderness, No masses, No pulsatile masses.   Skin: Warm, Dry, No erythema, No rash.   Back: No tenderness, No CVA tenderness.   Extremities: Intact distal pulses, No tenderness, No cyanosis, No clubbing. Right AV fistula located in the right upper arm. Palpable thrill. Port site in left upper chest wall is benign.  Neurologic: Alert & oriented x 3, Normal motor function, Normal sensory function, No focal deficits noted.     EKG      RADIOLOGY/PROCEDURES  No orders to display     Results for orders placed or performed during the hospital encounter of 03/27/18   URINALYSIS   Result Value Ref Range    Color Yellow     Character Clear     Specific Gravity 1.010 <1.035    Ph 8.0 5.0 - 8.0    Glucose 100 (A) Negative mg/dL    Ketones Negative Negative mg/dL    Protein 100 (A) Negative mg/dL    Bilirubin Negative Negative    Nitrite Negative Negative    Leukocyte Esterase Negative Negative    Occult Blood Trace (A) Negative    Micro Urine Req Microscopic    BETA-HCG QUALITATIVE URINE   Result Value Ref Range    Beta-Hcg Urine Negative Negative   REFRACTOMETER SG   Result Value Ref Range    Specific Gravity 1.010    CBC WITH DIFFERENTIAL   Result Value Ref Range    WBC 6.8 4.8 - 10.8 K/uL    RBC 3.62 (L) 4.20 - 5.40 M/uL    Hemoglobin 11.4 (L) 12.0 - 16.0 g/dL    Hematocrit 33.9 (L) 37.0 - 47.0 %    MCV 93.6 81.4 - 97.8 fL    MCH 31.5 27.0 - 33.0 pg    MCHC 33.6 33.6 - 35.0 g/dL    RDW 39.3 35.9 - 50.0 fL    Platelet Count 114 (L) 164 -  446 K/uL    MPV 10.3 9.0 - 12.9 fL    Neutrophils-Polys 65.90 44.00 - 72.00 %    Lymphocytes 22.70 22.00 - 41.00 %    Monocytes 8.80 0.00 - 13.40 %    Eosinophils 1.90 0.00 - 6.90 %    Basophils 0.40 0.00 - 1.80 %    Immature Granulocytes 0.30 0.00 - 0.90 %    Nucleated RBC 0.00 /100 WBC    Neutrophils (Absolute) 4.47 2.00 - 7.15 K/uL    Lymphs (Absolute) 1.54 1.00 - 4.80 K/uL    Monos (Absolute) 0.60 0.00 - 0.85 K/uL    Eos (Absolute) 0.13 0.00 - 0.51 K/uL    Baso (Absolute) 0.03 0.00 - 0.12 K/uL    Immature Granulocytes (abs) 0.02 0.00 - 0.11 K/uL    NRBC (Absolute) 0.00 K/uL   BASIC METABOLIC PANEL   Result Value Ref Range    Sodium 136 135 - 145 mmol/L    Potassium 5.1 3.6 - 5.5 mmol/L    Chloride 107 96 - 112 mmol/L    Co2 18 (L) 20 - 33 mmol/L    Glucose 86 65 - 99 mg/dL    Bun 76 (HH) 8 - 22 mg/dL    Creatinine 12.23 (HH) 0.50 - 1.40 mg/dL    Calcium 7.3 (L) 8.4 - 10.2 mg/dL    Anion Gap 11.0 0.0 - 11.9   WESTERGREN SED RATE   Result Value Ref Range    Sed Rate Westergren 33 (H) 0 - 20 mm/hour   CRP QUANTITIVE (NON-CARDIAC)   Result Value Ref Range    Stat C-Reactive Protein 0.96 (H) 0.00 - 0.75 mg/dL   ESTIMATED GFR   Result Value Ref Range    GFR If  4 (A) >60 mL/min/1.73 m 2    GFR If Non African American 3 (A) >60 mL/min/1.73 m 2   URINE MICROSCOPIC (W/UA)   Result Value Ref Range    WBC 5-10 (A) /hpf    Bacteria Rare (A) None /hpf    Epithelial Cells Moderate (A) Few /hpf         COURSE & MEDICAL DECISION MAKING  Pertinent Labs & Imaging studies reviewed. (See chart for details)    The patient presents today with an acute exacerbation of chronic pain syndrome. She has a long history of chronic musculoskeletal pain likely related to her lupus. Laboratory studies are obtained. CRP is slightly elevated at 0.96. Urinalysis is remarkable for 5-10 white blood cells. I did review the patient's history. The patient had a urinary tract infection with extended spectrum beta-lactamase resistant  organism. At this time she denies any urinary urgency or frequency. I will send the urine for culture and I will with hold treatment at this time she does not have any specific urine tract infection symptoms.    The patient will return for new or worsening symptoms and is stable at the time of discharge.    The patient is referred to a primary physician for blood pressure management, diabetic screening, and for all other preventative health concerns.    The patient has existing narcotic prescriptions from Dr. Collado. I'll have her contact Dr. Collado to discuss her pain management regimen. She reports that she has been referred to the pain management clinic and this is pending.    DISPOSITION:  Patient will be discharged home in stable condition.    FOLLOW UP:  [facility], Emergency Dept  [address]  [place] [address] [phone]    If symptoms worsen    Sushila Manzano M.D.  [address]  [place] [id number] [phone]    Schedule an appointment as soon as possible for a visit      Sherwin Collado M.D.  [address]  [place] [address]  [phone]            OUTPATIENT MEDICATIONS:  New Prescriptions    No medications on file         FINAL IMPRESSION  1. Body aches    2. Chronic pain syndrome              Electronically signed by: Herson Jansen, 3/27/2018 9:08 AM

## 2018-03-27 NOTE — DISCHARGE PLANNING
Outpatient Dialysis Note    Confirmed patient is active at:    [facility]  [address]   [place] [address]      Schedule: Monday, Wednesday, Friday  Time: 3:00 pm    Spoke with Lashawn at facility who confirmed.    Forwarded records for review.    PATIENT CAN DISCHARGE TO OUTPATIENT CLINIC TODAY AND NEEDS TO BE AT THE CLINIC BY 1:45PM FOR A 2:00PM DIALYSIS CHAIR. Relayed information to ER Nurse ext [phone].    Dialysis Coordinator, [facility]  Kelli Anderson [phone]

## 2018-03-27 NOTE — DISCHARGE INSTRUCTIONS
Chronic Pain Management  Managing chronic pain is not easy. The goal is to provide as much pain relief as possible. There are emotional as well as physical problems. Chronic pain may lead to symptoms of depression which magnify those of the pain.  Problems may include:  · Anxiety.  · Sleep disturbances.  · Confused thinking.  · Feeling cranky.  · Fatigue.  · Weight gain or loss.  Identify the source of the pain first, if possible. The pain may be masking another problem. Try to find a pain management specialist or clinic. Work with a team to create a treatment plan for you.  MEDICATIONS  · May include narcotics or opioids. Larger than normal doses may be needed to control your pain.  · Drugs for depression may help.  · Over-the-counter medicines may help for some conditions. These drugs may be used along with others for better pain relief.  · May be injected into sites such as the spine and joints. Injections may have to be repeated if they wear off.  THERAPY MAY INCLUDE:  · Working with a physical therapist to keep from getting stiff.  · Regular, gentle exercise.  · Cognitive or behavioral therapy.  · Using complementary or integrative medicine such as:  · Acupuncture.  · Massage, Reiki, or Rolfing.  · Aroma, color, light, or sound therapy.  · Group support.  FOR MORE INFORMATION  http://www.painfoundation.org.  American Chronic Pain Association http://www.thealpa.org.  Document Released: 01/25/2006 Document Revised: 03/11/2013 Document Reviewed: 03/05/2009  ExitCare® Patient Information ©2014 [employer].

## 2018-03-27 NOTE — ED NOTES
"Pt presents to ED with multiple c/o, pain all over worse in Left flank, states her legs have been \"locking up\" and she has been falling, missed her dialysis yesterday.  Also relates intermittent fevers for several days t-max 103.  "

## 2018-03-28 ENCOUNTER — PATIENT OUTREACH (OUTPATIENT)
Dept: HEALTH INFORMATION MANAGEMENT | Facility: OTHER | Age: 38
End: 2018-03-28

## 2018-03-29 LAB
BACTERIA UR CULT: NORMAL
SIGNIFICANT IND 70042: NORMAL
SITE SITE: NORMAL
SOURCE SOURCE: NORMAL

## 2018-04-12 ENCOUNTER — HOSPITAL ENCOUNTER (OUTPATIENT)
Dept: RADIOLOGY | Facility: MEDICAL CENTER | Age: 38
End: 2018-04-12
Attending: PHYSICAL MEDICINE & REHABILITATION
Payer: MEDICARE

## 2018-04-12 ENCOUNTER — OFFICE VISIT (OUTPATIENT)
Dept: PHYSICAL MEDICINE AND REHAB | Facility: MEDICAL CENTER | Age: 38
End: 2018-04-12
Payer: MEDICARE

## 2018-04-12 VITALS
DIASTOLIC BLOOD PRESSURE: 90 MMHG | HEIGHT: 65 IN | OXYGEN SATURATION: 93 % | HEART RATE: 94 BPM | BODY MASS INDEX: 32.32 KG/M2 | SYSTOLIC BLOOD PRESSURE: 138 MMHG | TEMPERATURE: 100.1 F | WEIGHT: 194 LBS

## 2018-04-12 DIAGNOSIS — R29.898 RIGHT LEG WEAKNESS: ICD-10-CM

## 2018-04-12 DIAGNOSIS — M25.512 BILATERAL SHOULDER PAIN, UNSPECIFIED CHRONICITY: ICD-10-CM

## 2018-04-12 DIAGNOSIS — Z99.2 ESRD (END STAGE RENAL DISEASE) ON DIALYSIS (HCC): ICD-10-CM

## 2018-04-12 DIAGNOSIS — M21.70 LEG LENGTH DIFFERENCE, ACQUIRED: ICD-10-CM

## 2018-04-12 DIAGNOSIS — M25.511 BILATERAL SHOULDER PAIN, UNSPECIFIED CHRONICITY: ICD-10-CM

## 2018-04-12 DIAGNOSIS — F11.90 CHRONIC, CONTINUOUS USE OF OPIOIDS: ICD-10-CM

## 2018-04-12 DIAGNOSIS — G62.9 NEUROPATHY: ICD-10-CM

## 2018-04-12 DIAGNOSIS — N18.6 ESRD (END STAGE RENAL DISEASE) ON DIALYSIS (HCC): ICD-10-CM

## 2018-04-12 DIAGNOSIS — M54.41 MIDLINE LOW BACK PAIN WITH RIGHT-SIDED SCIATICA, UNSPECIFIED CHRONICITY: ICD-10-CM

## 2018-04-12 PROCEDURE — 99205 OFFICE O/P NEW HI 60 MIN: CPT | Performed by: PHYSICAL MEDICINE & REHABILITATION

## 2018-04-12 PROCEDURE — 73030 X-RAY EXAM OF SHOULDER: CPT | Mod: RT

## 2018-04-12 PROCEDURE — 73030 X-RAY EXAM OF SHOULDER: CPT | Mod: LT

## 2018-04-12 ASSESSMENT — ENCOUNTER SYMPTOMS
NERVOUS/ANXIOUS: 1
CHILLS: 1
DEPRESSION: 1
FALLS: 1
ROS GI COMMENTS: H/O GI BLEED
FEVER: 1
BRUISES/BLEEDS EASILY: 1
BACK PAIN: 1
NECK PAIN: 1
MYALGIAS: 1
SHORTNESS OF BREATH: 1
BLURRED VISION: 1
SENSORY CHANGE: 1
NAUSEA: 1

## 2018-04-12 ASSESSMENT — PAIN SCALES - GENERAL: PAINLEVEL: 7=MODERATE-SEVERE PAIN

## 2018-04-12 NOTE — PROGRESS NOTES
New patient note    Physiatry (physical medicine and  Rehabilitation), interventional spine and sports medicine, Pain medicine    Date of Service: 4/12/2018    Chief complaint: Chronic pain    HISTORY    HPI: Debby Carrizales 37 y.o. female who has a diagnosis of lupus since Feb. 2004, stage 4 renal failure ESRD on hemodialysis since summer 2016.  She presents for evaluation of her chronic pain complaints.  Her low back pain is one of her primary pain complaints.  She has aching pain in her neck and shoulders.      She also has avascular necrosis in the right hip and is now status post hip replacement.  The left hip also has avascular necrosis and she needs to have replacement there as well.  It is very painful and makes walking difficult.  After her hip replacement, her right leg is longer than her left.  She has not been using a lift.    She has burning pain in her feet more than hands.  A neuropathy was diagnosed in 2004 related to lupus and she reports that she had an EMG at the time.  She takes lyrica for the neuropathy.  In the past, she took neuropathy.  Now, she is taking lyrica 100mg po tid.  Her nephrologist is aware of this dose.  Symptoms of neuropathy and fibromyalgia are worse when she is on a lower dose.      Pain is worse with activity of all kinds.  It is difficult for her to perform stretches.     Her sleep quality is poor and this has been a lifelong problem, now worse with the pain          Medical records review:  I reviewed the note from the referring provider Sushila Manzano M.D. 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    Medications the patient is tried: Narcotics and gabapentin, she has taken hydrocodone in the past, currently takes oxycodone, she has taken soma in the past    Previous surgeries to relieve the above pain:  Right total hip  replacement    ROS:   Review of Systems   Constitutional: Positive for chills and fever.   HENT: Positive for ear pain.    Eyes: Positive for blurred vision.   Respiratory: Positive for shortness of breath.    Cardiovascular: Positive for leg swelling.        Dialysis improves some   Gastrointestinal: Positive for nausea.        H/o gi bleed   Genitourinary: Positive for dysuria.        H/o UTIs, does make some urine   Musculoskeletal: Positive for back pain, falls, joint pain, myalgias and neck pain.   Skin: Positive for itching.   Neurological: Positive for sensory change.   Endo/Heme/Allergies: Bruises/bleeds easily.   Psychiatric/Behavioral: Positive for depression. The patient is nervous/anxious.        PMHx:   Past Medical History:   Diagnosis Date   • Arthritis     all joints,r/t lupus   • Avascular necrosis of bones of both hips (HCC) 10/10/2016   • Clostridium difficile colitis 5/3/2011   • Dialysis patient    • ESBL (extended spectrum beta-lactamase) producing bacteria infection 8/25/2014   • Fibromyalgia    • Hypertension    • Lupus    • Pneumonia    • Psychiatric disorder     anxiety, depression   • Pyelonephritis 11/21/2017   • Renal failure        PSHx:   Past Surgical History:   Procedure Laterality Date   • AV FISTULA CREATION Right 12/9/2017    Procedure: AV FISTULA CREATION-ARM FOR GRAFT;  Surgeon: Lesly Jansen M.D.;  Location: [facility];  Service: General   • THROMBECTOMY  12/9/2017    Procedure: THROMBECTOMY;  Surgeon: Lesly Jansen M.D.;  Location: [facility];  Service: General   • THROMBECTOMY Right 8/21/2016    Procedure: THROMBECTOMY - right AV fistula graft with grams;  Surgeon: Shabbir Ardon M.D.;  Location: [facility];  Service:    • ANGIOPLASTY BALLOON  8/21/2016    Procedure: ANGIOPLASTY BALLOON;  Surgeon: Shabbir Ardon M.D.;  Location: [facility];  Service:    • THROMBECTOMY Right 8/20/2016    Procedure:  THROMBECTOMY AV GRAFT;  Surgeon: Shabbir Ardon M.D.;  Location: SURGERY [facility];  Service:    • AV FISTULA CREATION Right 7/12/2016    Procedure: AV FISTULA CREATION WITH GRAFT BRACHIAL AXILLARY;  Surgeon: Shabbir Ardon M.D.;  Location: SURGERY [facility];  Service:    • CLOSED REDUCTION Right 7/5/2016    Procedure: CLOSED REDUCTION- Hip ;  Surgeon: Michael Holman M.D.;  Location: SURGERY [facility];  Service:    • HIP ARTHROPLASTY TOTAL Right 1/18/2016    Procedure: HIP ARTHROPLASTY TOTAL;  Surgeon: Michael Holman M.D.;  Location: [facility];  Service:    • AV FISTULA CREATION  2/3/2015    Performed by Shabbir Ardon M.D. at [facility]   • AV FISTULA CREATION  11/14/2014    Performed by Shabbir Ardon M.D. at [facility]   • AV FISTULA CREATION  9/9/2014    Performed by Shabbir Ardon M.D. at [facility]   • CATH PLACEMENT  9/9/2014    Performed by Shabbir Ardon M.D. at [facility]   • OTHER  5/2011    tracheostomy   • GASTROSCOPY-ENDO  4/27/2011    Performed by PALMIRA DOAN at ENDOSCOPY [facility]   • COLONOSCOPY WITH BIOPSY  4/20/2011    Performed by ALCIRA CRUZ at [facility]   • GASTROSCOPY-ENDO  4/18/2011    Performed by ALCIRA CRUZ at ENDOSCOPY [facility]   • GASTROSCOPY-ENDO  4/10/2011    Performed by SYED JURADO at [facility]   • SCLEROTHERAPHY  4/10/2011    Performed by SYED JURADO at ENDOSCOPY [facility]   • OTHER ABDOMINAL SURGERY      kidney biopsy   • TRACHEOSTOMY         Family history   Family History   Problem Relation Age of Onset   • Heart Disease Mother    • Cancer Father        Medications:   Current Outpatient Prescriptions   Medication   • LYRICA 100 MG Cap   • sevelamer carbonate (RENVELA) 800 MG Tab tablet   • Cinacalcet HCl (SENSIPAR PO)   • lidocaine (LIDODERM) 5 % Patch   • ferrous sulfate 325 (65 FE) MG  "tablet   • ascorbic acid (ASCORBIC ACID) 500 MG Tab   • buPROPion (WELLBUTRIN XL) 150 MG XL tablet   • hydroxychloroquine (PLAQUENIL) 200 MG Tab   • cholecalciferol (VITAMIN D3) 5000 UNIT Cap   • trazodone (DESYREL) 50 MG Tab   • tizanidine (ZANAFLEX) 4 MG Tab   • Sevelamer Carbonate (RENVELA PO)   • hydrOXYzine HCl (ATARAX) 50 MG Tab   • Oxycodone HCl 20 MG Tab   • pregabalin (LYRICA) 150 MG Cap   • promethazine (PHENERGAN) 25 MG Tab     No current facility-administered medications for this visit.        Allergies:   Allergies   Allergen Reactions   • Ativan Anxiety     Patient becomes severely paranoid and agitated   • Morphine Itching     Tolerates Dilaudid   • Seasonal Runny Nose and Itching     Hay fever, sabiha brush       Social Hx:  Social History     Social History   • Marital status: Single     Spouse name: N/A   • Number of children: N/A   • Years of education: N/A     Occupational History   • Not on file.     Social History Main Topics   • Smoking status: Former Smoker     Packs/day: 0.50     Years: 18.00     Types: Cigarettes     Quit date: 6/13/2011   • Smokeless tobacco: Never Used      Comment: 1/2 ppd   • Alcohol use No      Comment: occ   • Drug use: Yes     Types: Marijuana      Comment: occ   • Sexual activity: Not on file     Other Topics Concern   •  Service No   • Blood Transfusions Yes   • Caffeine Concern No   • Occupational Exposure No   • Hobby Hazards No   • Sleep Concern Yes   • Stress Concern Yes   • Weight Concern Yes   • Special Diet Yes   • Back Care No   • Exercise Yes   • Bike Helmet No   • Seat Belt Yes   • Self-Exams Yes     Social History Narrative   • No narrative on file         EXAMINATION     Physical Exam:   Vitals: Blood pressure 138/90, pulse 94, temperature 37.8 °C (100.1 °F), height 1.651 m (5' 5\"), weight 88 kg (194 lb), SpO2 93 %.    Constitutional:   Body Habitus: Body mass index is 32.28 kg/m².  Cooperation: Fully cooperates with exam  Appearance: " Well-groomed, well-nourished, not disheveled   Eyes: No scleral icterus to suggest severe liver disease, no proptosis to suggest severe hyperthyroid  ENT -no obvious auditory deficits, no obvious tongue lesions, tongue midline, no facial droop   Skin -scarring on right forearm after surgeries for fistula with some local decrease in sensation  Respiratory-  breathing comfortable on room air, no audible wheezing  Cardiovascular- capillary refills less than 2 seconds. No lower extremity edema is noted.   Gastrointestinal - no obvious abdominal masses, No tenderness to palpation in the abdomen  Psychiatric- alert and oriented ×3. Normal affect.   Gait - normal gait, no use of ambulatory device, mildly antalgic.  She has a short step length bilaterally.  Heel and toe walking are difficult, particularly on the right    Musculoskeletal -   Cervical spine   Inspection: No deformities of the skin over the cervical spine. No rashes or lesions.  functional  A/P ROM in all directions, without  pain    Spurling’s sign: negative bilaterally   Shoulders with decreased shoulder abduction to about 160 degrees bilaterally with pain.    Thoracic/Lumbar Spine/Sacral Spine/Hips   Inspection: No evidence of atrophy in bilateral lower extremities throughout   Pelvic crest height is elevated on the right compared with the left.  Negative forward flexion test.   ROM: decreased AROM with flexion, extension, lateral flexion, and rotation bilaterally, with pain     Palpation:   No tenderness to palpation in midline at T1-T12 levels. No tenderness to palpation in the left and right of the midline T1-L5, POSITIVE for tenderness to palpation to the para-midline region in the lower lumbar levels.  palpation over SI joint: negative bilaterally    palpation over buttock: negative bilaterally      Lumbar spine Special tests  Neuro tension  Straight leg test negative bilaterally      HIP  Internal and external ROM is symmetric bilaterally    SI joint  tests  Observation patient sits on one buttocks: Negative    Neuro     Key points for the international standards for neurological classification of spinal cord injury (ISNCSCI) to light touch.     Dermatome R L   C4 2 2   C5 2 2   C6 2 2   C7 1* 2   C8 1* 2   T1 2 2   T2 2 2   L2 2 2   L3 2 2   L4 1 2   L5 1 1   S1 1 1   S2 1 1   * patchy decrease in sensation in region of fistula/surgical scars    Motor Exam Upper Extremities   ? Myotome R L   Shoulder flexion C5 5 5   Elbow flexion C5 5 5   Wrist extension C6 5 5   Elbow extension C7 5 5   Finger flexion C8 5 5   Finger abduction T1 5 5         Motor Exam Lower Extremities    ? Myotome R L   Hip flexion L2 4* pain 5   Knee extension L3 5 5   Ankle dorsiflexion L4 5- 5-   Toe extension L5 5- 5-   Ankle plantarflexion S1 4+ 4+       Flores’s sign negative bilaterally   Babinski sign negative bilaterally   Clonus of the ankle negative bilaterally     Reflexes  ?  R L   Biceps  2+  2+   Brachioradialis  2+ 2+   Patella  2+ 2+   Achilles   1+ 1+       MEDICAL DECISION MAKING    Medical records review: see under HPI section.     DATA    Labs:   ESR 03/28/2018 33  Lab Results   Component Value Date/Time    SODIUM 136 03/27/2018 06:46 AM    POTASSIUM 5.1 03/27/2018 06:46 AM    CHLORIDE 107 03/27/2018 06:46 AM    CO2 18 (L) 03/27/2018 06:46 AM    GLUCOSE 86 03/27/2018 06:46 AM    BUN 76 (HH) 03/27/2018 06:46 AM    CREATININE 12.23 (HH) 03/27/2018 06:46 AM    CREATININE 0.9 12/13/2008 12:05 AM        Lab Results   Component Value Date/Time    PROTHROMBTM 13.0 12/07/2017 01:40 AM    INR 1.01 12/07/2017 01:40 AM        Lab Results   Component Value Date/Time    WBC 6.8 03/27/2018 06:46 AM    RBC 3.62 (L) 03/27/2018 06:46 AM    HEMOGLOBIN 11.4 (L) 03/27/2018 06:46 AM    HEMATOCRIT 33.9 (L) 03/27/2018 06:46 AM    MCV 93.6 03/27/2018 06:46 AM    MCH 31.5 03/27/2018 06:46 AM    MCHC 33.6 03/27/2018 06:46 AM    MPV 10.3 03/27/2018 06:46 AM    NEUTSPOLYS 65.90 03/27/2018 06:46 AM     LYMPHOCYTES 22.70 03/27/2018 06:46 AM    MONOCYTES 8.80 03/27/2018 06:46 AM    EOSINOPHILS 1.90 03/27/2018 06:46 AM    BASOPHILS 0.40 03/27/2018 06:46 AM    HYPOCHROMIA 1+ 07/26/2017 07:10 AM    ANISOCYTOSIS 1+ 07/27/2017 05:26 AM          Imaging: I personally reviewed following images, these are my reads  07/05/2016: Xray unilateral pelvis: Superior dislocation of the femoral head of the right total hip arthroplasty without fracture  01/07/2016: Hips: Avascular necrosis of the right femoral head with subchondral collapse. Sclerosis of the left hip, suggestive of early avascular necrosis    IMAGING radiology reads. I reviewed the following radiology reads         Results for orders placed during the hospital encounter of 07/23/10   MR-BRAIN-WITH & W/O        Results for orders placed during the hospital encounter of 02/23/12   MR-BRAIN-W/O    Impression 1. No evidence of vestibular schwannoma.    2. Mild small vessel disease, probably greater than expected for age, recommend clinical correlation.                                 Results for orders placed during the hospital encounter of 07/19/17   MR-LUMBAR SPINE-W/O    Impression 1.  Diffuse decreased bone marrow signal intensity throughout the lumbar spine and sacrum, presumably secondary to chronic anemia.    2.  Otherwise unremarkable MRI scan of the lumbar spine without contrast.                Results for orders placed during the hospital encounter of 07/19/17   MR-LUMBAR SPINE-W/O    Impression 1.  Diffuse decreased bone marrow signal intensity throughout the lumbar spine and sacrum, presumably secondary to chronic anemia.    2.  Otherwise unremarkable MRI scan of the lumbar spine without contrast.                                                        Diagnosis   Diagnoses of Leg length difference, acquired, Bilateral shoulder pain, unspecified chronicity, ESRD (end stage renal disease) on dialysis (CMS-[facility]), Chronic, continuous use of opioids, Neuropathy  (CMS-[facility]), Right leg weakness, and Midline low back pain with right-sided sciatica, unspecified chronicity were pertinent to this visit.      ASSESSMENT:  Debby Carrizales 37 y.o. female with a complex past medical history and multiple pain complaints     Debby was seen today for new patient.    Diagnoses and all orders for this visit:    Leg length difference, acquired  -     DX-BONE LENGTH STUDY; Future    Bilateral shoulder pain, unspecified chronicity  -     DX-SHOULDER 2+ RIGHT; Future  -     DX-SHOULDER 2+ LEFT; Future    ESRD (end stage renal disease) on dialysis (CMS-[facility])  -     PAIN MANAGEMENT SCRN, UR; Future    Chronic, continuous use of opioids  -     PAIN MANAGEMENT SCRN, UR; Future    Neuropathy (CMS-HCC)  -     REFERRAL TO EMG - PHYSIATRY (PMR)    Right leg weakness  -     REFERRAL TO EMG - PHYSIATRY (PMR)    Midline low back pain with right-sided sciatica, unspecified chronicity  -     REFERRAL TO EMG - PHYSIATRY (PMR)      Check leg length.  We can correct this until she has left total hip.   I don't have recent hip imaging, but will review leg length studies.    Xrays of the shoulder bilaterally.  She has limitation to ROM.    Check UDS.  Discussed possible changes to her medications.  ESRD on HD which makes it challenging to get a stable medication dose.      She has a history of dislocation of the right total hip and has weakness in the right leg greater than the left.  Plan to perform EMG of the right lower extremity to evaluate for pattern that could explain as her lumbar spine imaging does not.  Suspect peripheral joint.  It will be helpful to evaluate neuropathy as well at that time.  Her EMG was about 14 years ago and she does not recall where.     Follow-up:3 weeks and for EMG    Total time: minutes face-to-face time. I spent greater than 50% of the time for patient care and coordination on this date, including with the patient as per assessment and plan above.       Quinn Chandler,  MD  Physical Medicine and Rehabilitation  Interventional Spine and Sports Physiatry  [facility] Sushila Manzano M.D.

## 2018-04-17 ENCOUNTER — HOSPITAL ENCOUNTER (INPATIENT)
Facility: MEDICAL CENTER | Age: 38
LOS: 4 days | DRG: 252 | End: 2018-04-22
Attending: EMERGENCY MEDICINE | Admitting: HOSPITALIST
Payer: MEDICARE

## 2018-04-17 DIAGNOSIS — N18.6 END STAGE RENAL DISEASE (HCC): ICD-10-CM

## 2018-04-17 DIAGNOSIS — T82.9XXA DIALYSIS COMPLICATION, INITIAL ENCOUNTER: ICD-10-CM

## 2018-04-17 DIAGNOSIS — Z99.2 DEPENDENCE ON RENAL DIALYSIS (HCC): ICD-10-CM

## 2018-04-17 LAB
ALBUMIN SERPL BCP-MCNC: 3.5 G/DL (ref 3.2–4.9)
ALBUMIN/GLOB SERPL: 1.1 G/DL
ALP SERPL-CCNC: 89 U/L (ref 30–99)
ALT SERPL-CCNC: 27 U/L (ref 2–50)
ANION GAP SERPL CALC-SCNC: 15 MMOL/L (ref 0–11.9)
APPEARANCE UR: ABNORMAL
AST SERPL-CCNC: 13 U/L (ref 12–45)
BACTERIA #/AREA URNS HPF: ABNORMAL /HPF
BASOPHILS # BLD AUTO: 1 % (ref 0–1.8)
BASOPHILS # BLD: 0.05 K/UL (ref 0–0.12)
BILIRUB SERPL-MCNC: 0.5 MG/DL (ref 0.1–1.5)
BILIRUB UR QL STRIP.AUTO: NEGATIVE
BUN SERPL-MCNC: 49 MG/DL (ref 8–22)
CALCIUM SERPL-MCNC: 8.3 MG/DL (ref 8.5–10.5)
CHLORIDE SERPL-SCNC: 105 MMOL/L (ref 96–112)
CO2 SERPL-SCNC: 18 MMOL/L (ref 20–33)
COLOR UR: YELLOW
CREAT SERPL-MCNC: 10.68 MG/DL (ref 0.5–1.4)
CULTURE IF INDICATED INDCX: YES UA CULTURE
EOSINOPHIL # BLD AUTO: 0.11 K/UL (ref 0–0.51)
EOSINOPHIL NFR BLD: 2.2 % (ref 0–6.9)
EPI CELLS #/AREA URNS HPF: ABNORMAL /HPF
ERYTHROCYTE [DISTWIDTH] IN BLOOD BY AUTOMATED COUNT: 41.6 FL (ref 35.9–50)
GLOBULIN SER CALC-MCNC: 3.1 G/DL (ref 1.9–3.5)
GLUCOSE SERPL-MCNC: 101 MG/DL (ref 65–99)
GLUCOSE UR STRIP.AUTO-MCNC: 100 MG/DL
HCT VFR BLD AUTO: 34.4 % (ref 37–47)
HGB BLD-MCNC: 11.1 G/DL (ref 12–16)
HYALINE CASTS #/AREA URNS LPF: ABNORMAL /LPF
IMM GRANULOCYTES # BLD AUTO: 0.02 K/UL (ref 0–0.11)
IMM GRANULOCYTES NFR BLD AUTO: 0.4 % (ref 0–0.9)
KETONES UR STRIP.AUTO-MCNC: NEGATIVE MG/DL
LEUKOCYTE ESTERASE UR QL STRIP.AUTO: ABNORMAL
LIPASE SERPL-CCNC: 14 U/L (ref 11–82)
LYMPHOCYTES # BLD AUTO: 1.37 K/UL (ref 1–4.8)
LYMPHOCYTES NFR BLD: 27.7 % (ref 22–41)
MCH RBC QN AUTO: 30.8 PG (ref 27–33)
MCHC RBC AUTO-ENTMCNC: 32.3 G/DL (ref 33.6–35)
MCV RBC AUTO: 95.6 FL (ref 81.4–97.8)
MICRO URNS: ABNORMAL
MONOCYTES # BLD AUTO: 0.41 K/UL (ref 0–0.85)
MONOCYTES NFR BLD AUTO: 8.3 % (ref 0–13.4)
NEUTROPHILS # BLD AUTO: 2.98 K/UL (ref 2–7.15)
NEUTROPHILS NFR BLD: 60.4 % (ref 44–72)
NITRITE UR QL STRIP.AUTO: NEGATIVE
NRBC # BLD AUTO: 0 K/UL
NRBC BLD-RTO: 0 /100 WBC
PH UR STRIP.AUTO: 8.5 [PH]
PLATELET # BLD AUTO: 132 K/UL (ref 164–446)
PMV BLD AUTO: 10.3 FL (ref 9–12.9)
POTASSIUM SERPL-SCNC: 5.2 MMOL/L (ref 3.6–5.5)
PROT SERPL-MCNC: 6.6 G/DL (ref 6–8.2)
PROT UR QL STRIP: 100 MG/DL
RBC # BLD AUTO: 3.6 M/UL (ref 4.2–5.4)
RBC # URNS HPF: ABNORMAL /HPF
RBC UR QL AUTO: ABNORMAL
SODIUM SERPL-SCNC: 138 MMOL/L (ref 135–145)
SP GR UR STRIP.AUTO: 1.01
UROBILINOGEN UR STRIP.AUTO-MCNC: 0.2 MG/DL
WBC # BLD AUTO: 4.9 K/UL (ref 4.8–10.8)
WBC #/AREA URNS HPF: ABNORMAL /HPF

## 2018-04-17 PROCEDURE — 80053 COMPREHEN METABOLIC PANEL: CPT

## 2018-04-17 PROCEDURE — 87040 BLOOD CULTURE FOR BACTERIA: CPT

## 2018-04-17 PROCEDURE — 93990 DOPPLER FLOW TESTING: CPT

## 2018-04-17 PROCEDURE — 700111 HCHG RX REV CODE 636 W/ 250 OVERRIDE (IP): Performed by: EMERGENCY MEDICINE

## 2018-04-17 PROCEDURE — 96376 TX/PRO/DX INJ SAME DRUG ADON: CPT

## 2018-04-17 PROCEDURE — 96375 TX/PRO/DX INJ NEW DRUG ADDON: CPT

## 2018-04-17 PROCEDURE — 36415 COLL VENOUS BLD VENIPUNCTURE: CPT

## 2018-04-17 PROCEDURE — 87086 URINE CULTURE/COLONY COUNT: CPT

## 2018-04-17 PROCEDURE — 83690 ASSAY OF LIPASE: CPT

## 2018-04-17 PROCEDURE — 85025 COMPLETE CBC W/AUTO DIFF WBC: CPT

## 2018-04-17 PROCEDURE — 81001 URINALYSIS AUTO W/SCOPE: CPT

## 2018-04-17 PROCEDURE — 96374 THER/PROPH/DIAG INJ IV PUSH: CPT

## 2018-04-17 PROCEDURE — 99285 EMERGENCY DEPT VISIT HI MDM: CPT

## 2018-04-17 RX ORDER — CEFTRIAXONE 2 G/1
2 INJECTION, POWDER, FOR SOLUTION INTRAMUSCULAR; INTRAVENOUS ONCE
Status: COMPLETED | OUTPATIENT
Start: 2018-04-17 | End: 2018-04-18

## 2018-04-17 RX ORDER — ONDANSETRON 2 MG/ML
4 INJECTION INTRAMUSCULAR; INTRAVENOUS ONCE
Status: COMPLETED | OUTPATIENT
Start: 2018-04-17 | End: 2018-04-17

## 2018-04-17 RX ADMIN — HYDROMORPHONE HYDROCHLORIDE 1 MG: 10 INJECTION, SOLUTION INTRAMUSCULAR; INTRAVENOUS; SUBCUTANEOUS at 23:45

## 2018-04-17 RX ADMIN — ONDANSETRON 4 MG: 2 INJECTION, SOLUTION INTRAMUSCULAR; INTRAVENOUS at 21:50

## 2018-04-17 RX ADMIN — HYDROMORPHONE HYDROCHLORIDE 1 MG: 10 INJECTION, SOLUTION INTRAMUSCULAR; INTRAVENOUS; SUBCUTANEOUS at 21:49

## 2018-04-17 ASSESSMENT — PAIN SCALES - GENERAL
PAINLEVEL_OUTOF10: 6
PAINLEVEL_OUTOF10: 10
PAINLEVEL_OUTOF10: 8

## 2018-04-17 ASSESSMENT — LIFESTYLE VARIABLES: DO YOU DRINK ALCOHOL: NO

## 2018-04-18 ENCOUNTER — RESOLUTE PROFESSIONAL BILLING HOSPITAL PROF FEE (OUTPATIENT)
Dept: HOSPITALIST | Facility: MEDICAL CENTER | Age: 38
End: 2018-04-18
Payer: MEDICARE

## 2018-04-18 PROBLEM — Z00.6 RESEARCH STUDY PATIENT: Status: ACTIVE | Noted: 2018-04-18

## 2018-04-18 PROBLEM — N39.0 UTI (URINARY TRACT INFECTION): Status: ACTIVE | Noted: 2018-04-18

## 2018-04-18 LAB
ANION GAP SERPL CALC-SCNC: 14 MMOL/L (ref 0–11.9)
BASOPHILS # BLD AUTO: 0.7 % (ref 0–1.8)
BASOPHILS # BLD: 0.03 K/UL (ref 0–0.12)
BUN SERPL-MCNC: 54 MG/DL (ref 8–22)
CALCIUM SERPL-MCNC: 8.2 MG/DL (ref 8.5–10.5)
CHLORIDE SERPL-SCNC: 105 MMOL/L (ref 96–112)
CO2 SERPL-SCNC: 20 MMOL/L (ref 20–33)
CREAT SERPL-MCNC: 10.55 MG/DL (ref 0.5–1.4)
EOSINOPHIL # BLD AUTO: 0.09 K/UL (ref 0–0.51)
EOSINOPHIL NFR BLD: 2 % (ref 0–6.9)
ERYTHROCYTE [DISTWIDTH] IN BLOOD BY AUTOMATED COUNT: 42.2 FL (ref 35.9–50)
EST. AVERAGE GLUCOSE BLD GHB EST-MCNC: 105 MG/DL
GLUCOSE SERPL-MCNC: 85 MG/DL (ref 65–99)
HBA1C MFR BLD: 5.3 % (ref 0–5.6)
HCT VFR BLD AUTO: 31.6 % (ref 37–47)
HGB BLD-MCNC: 10.2 G/DL (ref 12–16)
IMM GRANULOCYTES # BLD AUTO: 0.01 K/UL (ref 0–0.11)
IMM GRANULOCYTES NFR BLD AUTO: 0.2 % (ref 0–0.9)
LYMPHOCYTES # BLD AUTO: 1.68 K/UL (ref 1–4.8)
LYMPHOCYTES NFR BLD: 36.8 % (ref 22–41)
MCH RBC QN AUTO: 31.1 PG (ref 27–33)
MCHC RBC AUTO-ENTMCNC: 32.3 G/DL (ref 33.6–35)
MCV RBC AUTO: 96.3 FL (ref 81.4–97.8)
MONOCYTES # BLD AUTO: 0.49 K/UL (ref 0–0.85)
MONOCYTES NFR BLD AUTO: 10.7 % (ref 0–13.4)
NEUTROPHILS # BLD AUTO: 2.26 K/UL (ref 2–7.15)
NEUTROPHILS NFR BLD: 49.6 % (ref 44–72)
NRBC # BLD AUTO: 0 K/UL
NRBC BLD-RTO: 0 /100 WBC
PLATELET # BLD AUTO: 126 K/UL (ref 164–446)
PMV BLD AUTO: 10.4 FL (ref 9–12.9)
POTASSIUM SERPL-SCNC: 4.9 MMOL/L (ref 3.6–5.5)
RBC # BLD AUTO: 3.28 M/UL (ref 4.2–5.4)
SODIUM SERPL-SCNC: 139 MMOL/L (ref 135–145)
WBC # BLD AUTO: 4.6 K/UL (ref 4.8–10.8)

## 2018-04-18 PROCEDURE — A9270 NON-COVERED ITEM OR SERVICE: HCPCS | Performed by: INTERNAL MEDICINE

## 2018-04-18 PROCEDURE — 700111 HCHG RX REV CODE 636 W/ 250 OVERRIDE (IP): Performed by: EMERGENCY MEDICINE

## 2018-04-18 PROCEDURE — 700102 HCHG RX REV CODE 250 W/ 637 OVERRIDE(OP): Performed by: HOSPITALIST

## 2018-04-18 PROCEDURE — 770006 HCHG ROOM/CARE - MED/SURG/GYN SEMI*

## 2018-04-18 PROCEDURE — 87040 BLOOD CULTURE FOR BACTERIA: CPT

## 2018-04-18 PROCEDURE — 80048 BASIC METABOLIC PNL TOTAL CA: CPT

## 2018-04-18 PROCEDURE — A9270 NON-COVERED ITEM OR SERVICE: HCPCS | Performed by: HOSPITALIST

## 2018-04-18 PROCEDURE — 83036 HEMOGLOBIN GLYCOSYLATED A1C: CPT

## 2018-04-18 PROCEDURE — 700111 HCHG RX REV CODE 636 W/ 250 OVERRIDE (IP): Performed by: HOSPITALIST

## 2018-04-18 PROCEDURE — 96375 TX/PRO/DX INJ NEW DRUG ADDON: CPT

## 2018-04-18 PROCEDURE — 700102 HCHG RX REV CODE 250 W/ 637 OVERRIDE(OP): Performed by: INTERNAL MEDICINE

## 2018-04-18 PROCEDURE — 99223 1ST HOSP IP/OBS HIGH 75: CPT | Mod: AI | Performed by: HOSPITALIST

## 2018-04-18 PROCEDURE — 85025 COMPLETE CBC W/AUTO DIFF WBC: CPT

## 2018-04-18 RX ORDER — PREGABALIN 150 MG/1
150 CAPSULE ORAL 3 TIMES DAILY
COMMUNITY
End: 2018-06-07

## 2018-04-18 RX ORDER — TRAMADOL HYDROCHLORIDE 50 MG/1
50 TABLET ORAL EVERY 6 HOURS PRN
Status: DISCONTINUED | OUTPATIENT
Start: 2018-04-18 | End: 2018-04-18

## 2018-04-18 RX ORDER — PREGABALIN 100 MG/1
100 CAPSULE ORAL 2 TIMES DAILY
Status: DISCONTINUED | OUTPATIENT
Start: 2018-04-18 | End: 2018-04-22 | Stop reason: HOSPADM

## 2018-04-18 RX ORDER — OXYCODONE HYDROCHLORIDE 10 MG/1
10 TABLET ORAL EVERY 4 HOURS PRN
Status: DISCONTINUED | OUTPATIENT
Start: 2018-04-18 | End: 2018-04-22 | Stop reason: HOSPADM

## 2018-04-18 RX ORDER — OXYCODONE HYDROCHLORIDE 10 MG/1
10 TABLET ORAL EVERY 6 HOURS PRN
Status: ON HOLD | COMMUNITY
End: 2018-04-18

## 2018-04-18 RX ORDER — PREGABALIN 100 MG/1
100 CAPSULE ORAL 2 TIMES DAILY
Status: ON HOLD | COMMUNITY
End: 2018-04-22

## 2018-04-18 RX ORDER — ACETAMINOPHEN 325 MG/1
650 TABLET ORAL EVERY 6 HOURS PRN
Status: DISCONTINUED | OUTPATIENT
Start: 2018-04-18 | End: 2018-04-22 | Stop reason: HOSPADM

## 2018-04-18 RX ORDER — ONDANSETRON 2 MG/ML
4 INJECTION INTRAMUSCULAR; INTRAVENOUS EVERY 4 HOURS PRN
Status: DISCONTINUED | OUTPATIENT
Start: 2018-04-18 | End: 2018-04-22 | Stop reason: HOSPADM

## 2018-04-18 RX ORDER — HYDROXYCHLOROQUINE SULFATE 200 MG/1
200 TABLET, FILM COATED ORAL
Status: DISCONTINUED | OUTPATIENT
Start: 2018-04-18 | End: 2018-04-22 | Stop reason: HOSPADM

## 2018-04-18 RX ORDER — SEVELAMER CARBONATE 800 MG/1
2400 TABLET, FILM COATED ORAL
Status: DISCONTINUED | OUTPATIENT
Start: 2018-04-18 | End: 2018-04-22 | Stop reason: HOSPADM

## 2018-04-18 RX ORDER — OXYCODONE HYDROCHLORIDE 20 MG/1
20 TABLET ORAL EVERY 6 HOURS PRN
COMMUNITY
End: 2018-05-01 | Stop reason: SDUPTHER

## 2018-04-18 RX ORDER — CINACALCET 30 MG/1
30 TABLET, FILM COATED ORAL
COMMUNITY
End: 2018-12-04

## 2018-04-18 RX ORDER — ONDANSETRON 4 MG/1
4 TABLET, ORALLY DISINTEGRATING ORAL EVERY 4 HOURS PRN
Status: DISCONTINUED | OUTPATIENT
Start: 2018-04-18 | End: 2018-04-22 | Stop reason: HOSPADM

## 2018-04-18 RX ORDER — POLYETHYLENE GLYCOL 3350 17 G/17G
1 POWDER, FOR SOLUTION ORAL
Status: DISCONTINUED | OUTPATIENT
Start: 2018-04-18 | End: 2018-04-22 | Stop reason: HOSPADM

## 2018-04-18 RX ORDER — AMOXICILLIN 250 MG
2 CAPSULE ORAL 2 TIMES DAILY
Status: DISCONTINUED | OUTPATIENT
Start: 2018-04-18 | End: 2018-04-22 | Stop reason: HOSPADM

## 2018-04-18 RX ORDER — TRAZODONE HYDROCHLORIDE 50 MG/1
50 TABLET ORAL
Status: DISCONTINUED | OUTPATIENT
Start: 2018-04-18 | End: 2018-04-22 | Stop reason: HOSPADM

## 2018-04-18 RX ORDER — CINACALCET 30 MG/1
30 TABLET, FILM COATED ORAL
Status: DISCONTINUED | OUTPATIENT
Start: 2018-04-18 | End: 2018-04-22 | Stop reason: HOSPADM

## 2018-04-18 RX ORDER — OXYCODONE AND ACETAMINOPHEN 10; 325 MG/1; MG/1
1 TABLET ORAL EVERY 4 HOURS PRN
Status: DISCONTINUED | OUTPATIENT
Start: 2018-04-18 | End: 2018-04-18 | Stop reason: ALTCHOICE

## 2018-04-18 RX ORDER — OXYCODONE HYDROCHLORIDE 10 MG/1
20 TABLET ORAL EVERY 4 HOURS PRN
Status: DISCONTINUED | OUTPATIENT
Start: 2018-04-18 | End: 2018-04-22 | Stop reason: HOSPADM

## 2018-04-18 RX ORDER — BUPROPION HYDROCHLORIDE 150 MG/1
150 TABLET, EXTENDED RELEASE ORAL EVERY MORNING
Status: DISCONTINUED | OUTPATIENT
Start: 2018-04-18 | End: 2018-04-22 | Stop reason: HOSPADM

## 2018-04-18 RX ORDER — PROMETHAZINE HYDROCHLORIDE 12.5 MG/1
12.5-25 SUPPOSITORY RECTAL EVERY 4 HOURS PRN
Status: DISCONTINUED | OUTPATIENT
Start: 2018-04-18 | End: 2018-04-22 | Stop reason: HOSPADM

## 2018-04-18 RX ORDER — CLONIDINE HYDROCHLORIDE 0.1 MG/1
0.1 TABLET ORAL EVERY 6 HOURS PRN
Status: DISCONTINUED | OUTPATIENT
Start: 2018-04-18 | End: 2018-04-22 | Stop reason: HOSPADM

## 2018-04-18 RX ORDER — HEPARIN SODIUM 5000 [USP'U]/ML
5000 INJECTION, SOLUTION INTRAVENOUS; SUBCUTANEOUS EVERY 8 HOURS
Status: DISCONTINUED | OUTPATIENT
Start: 2018-04-18 | End: 2018-04-22 | Stop reason: HOSPADM

## 2018-04-18 RX ORDER — TIZANIDINE 4 MG/1
8 TABLET ORAL
Status: DISCONTINUED | OUTPATIENT
Start: 2018-04-18 | End: 2018-04-22 | Stop reason: HOSPADM

## 2018-04-18 RX ORDER — PROMETHAZINE HYDROCHLORIDE 25 MG/1
12.5-25 TABLET ORAL EVERY 4 HOURS PRN
Status: DISCONTINUED | OUTPATIENT
Start: 2018-04-18 | End: 2018-04-22 | Stop reason: HOSPADM

## 2018-04-18 RX ORDER — BISACODYL 10 MG
10 SUPPOSITORY, RECTAL RECTAL
Status: DISCONTINUED | OUTPATIENT
Start: 2018-04-18 | End: 2018-04-22 | Stop reason: HOSPADM

## 2018-04-18 RX ADMIN — PREGABALIN 100 MG: 100 CAPSULE ORAL at 03:43

## 2018-04-18 RX ADMIN — Medication 125 MG: at 22:06

## 2018-04-18 RX ADMIN — SEVELAMER CARBONATE 2400 MG: 800 TABLET, FILM COATED ORAL at 17:15

## 2018-04-18 RX ADMIN — TIZANIDINE 8 MG: 4 TABLET ORAL at 03:43

## 2018-04-18 RX ADMIN — HEPARIN SODIUM 5000 UNITS: 5000 INJECTION, SOLUTION INTRAVENOUS; SUBCUTANEOUS at 14:56

## 2018-04-18 RX ADMIN — OXYCODONE HYDROCHLORIDE 10 MG: 10 TABLET ORAL at 10:47

## 2018-04-18 RX ADMIN — HYDROXYCHLOROQUINE SULFATE 200 MG: 200 TABLET, FILM COATED ORAL at 22:07

## 2018-04-18 RX ADMIN — OXYCODONE HYDROCHLORIDE AND ACETAMINOPHEN 1 TABLET: 10; 325 TABLET ORAL at 01:45

## 2018-04-18 RX ADMIN — CINACALCET HYDROCHLORIDE 30 MG: 30 TABLET, COATED ORAL at 22:06

## 2018-04-18 RX ADMIN — BUPROPION HYDROCHLORIDE 150 MG: 150 TABLET, EXTENDED RELEASE ORAL at 08:34

## 2018-04-18 RX ADMIN — TIZANIDINE 8 MG: 4 TABLET ORAL at 22:06

## 2018-04-18 RX ADMIN — CEFTRIAXONE SODIUM 2 G: 2 INJECTION, POWDER, FOR SOLUTION INTRAMUSCULAR; INTRAVENOUS at 01:07

## 2018-04-18 RX ADMIN — PREGABALIN 100 MG: 100 CAPSULE ORAL at 22:06

## 2018-04-18 RX ADMIN — HEPARIN SODIUM 5000 UNITS: 5000 INJECTION, SOLUTION INTRAVENOUS; SUBCUTANEOUS at 22:07

## 2018-04-18 RX ADMIN — PREGABALIN 100 MG: 100 CAPSULE ORAL at 08:34

## 2018-04-18 RX ADMIN — PROMETHAZINE HYDROCHLORIDE 25 MG: 25 TABLET ORAL at 10:22

## 2018-04-18 RX ADMIN — Medication 125 MG: at 15:25

## 2018-04-18 RX ADMIN — OXYCODONE HYDROCHLORIDE AND ACETAMINOPHEN 1 TABLET: 10; 325 TABLET ORAL at 06:04

## 2018-04-18 RX ADMIN — PROMETHAZINE HYDROCHLORIDE 25 MG: 25 TABLET ORAL at 22:07

## 2018-04-18 RX ADMIN — OXYCODONE HYDROCHLORIDE 20 MG: 10 TABLET ORAL at 19:21

## 2018-04-18 RX ADMIN — TRAZODONE HYDROCHLORIDE 50 MG: 50 TABLET ORAL at 03:43

## 2018-04-18 RX ADMIN — OXYCODONE HYDROCHLORIDE 20 MG: 10 TABLET ORAL at 14:54

## 2018-04-18 RX ADMIN — TRAZODONE HYDROCHLORIDE 50 MG: 50 TABLET ORAL at 22:06

## 2018-04-18 ASSESSMENT — ENCOUNTER SYMPTOMS
NAUSEA: 0
EYES NEGATIVE: 1
FEVER: 1
RESPIRATORY NEGATIVE: 1
SHORTNESS OF BREATH: 1
VOMITING: 0
ABDOMINAL PAIN: 0
CARDIOVASCULAR NEGATIVE: 1
PSYCHIATRIC NEGATIVE: 1
FLANK PAIN: 1
NEUROLOGICAL NEGATIVE: 1
CONSTIPATION: 0
FEVER: 0
BACK PAIN: 1
CHILLS: 0
GASTROINTESTINAL NEGATIVE: 1
NERVOUS/ANXIOUS: 1
MYALGIAS: 0
DIARRHEA: 0
HEADACHES: 0
COUGH: 0
CHILLS: 1
DIZZINESS: 0

## 2018-04-18 ASSESSMENT — PAIN SCALES - GENERAL
PAINLEVEL_OUTOF10: 5
PAINLEVEL_OUTOF10: 9
PAINLEVEL_OUTOF10: 8
PAINLEVEL_OUTOF10: 5
PAINLEVEL_OUTOF10: 5
PAINLEVEL_OUTOF10: 9
PAINLEVEL_OUTOF10: 10
PAINLEVEL_OUTOF10: 10

## 2018-04-18 ASSESSMENT — LIFESTYLE VARIABLES
ALCOHOL_USE: NO
EVER_SMOKED: YES

## 2018-04-18 ASSESSMENT — PATIENT HEALTH QUESTIONNAIRE - PHQ9
1. LITTLE INTEREST OR PLEASURE IN DOING THINGS: NOT AT ALL
2. FEELING DOWN, DEPRESSED, IRRITABLE, OR HOPELESS: NOT AT ALL
SUM OF ALL RESPONSES TO PHQ9 QUESTIONS 1 AND 2: 0

## 2018-04-18 NOTE — ED NOTES
Pt states surgeon sent in so they may place a graft. Has not went to diaylsis since Friday..  States normally goes three times a week..  Pt states left and right kidney pain, left to right radiating to flanks.   Right side fistula is problem.

## 2018-04-18 NOTE — ASSESSMENT & PLAN NOTE
This patient has been recruited in the study: Efficacy of prophylactic oral Vancomycin in preventing recurrent Clostridium Difficile infection in hospitalized patients requiring antibiotics. Approximately 300 subjects will be randomized to one of three treatment arms in a 1:1:1 ratio (Vancomycin 125 mg PO every 12 hours for duration of antibiotic therapy, Vancomycin 125 mg PO every 24 hours for duration of antibiotic therapy, or no oral Vancomycin). Follow-up for endpoints will be done at 12 weeks following completion of antibiotic therapy.  Please contact study group before discharging this patient. This patient may need [facility] at discharge. [facility] will provide the required antibiotics.   Contact Information:  [email or web address]

## 2018-04-18 NOTE — PROGRESS NOTES
Patient arrived to floor via gurney from ED, ambulated independently to the hospital bed, admission completed, 2 RN skin check done with Medina RN, patient has small scab over left shoulder no bleeding noted, small scratches noted to bilateral arms and buttocks also no bleeding noted at these sites, right upper arm fistula with swelling and redness noted MD aware, no open wounds or breakdown noted at this time

## 2018-04-18 NOTE — DISCHARGE PLANNING
Outpatient Dialysis Note    Confirmed patient is active at:    [facility]  [address]   [place] [address]      Schedule: Monday, Wednesday, Friday  Time: 3:30 pm    Spoke with Lashawn at facility who confirmed.    Forwarded records for review.    Dialysis Coordinator, [facility]  Kelli Anderson [phone]

## 2018-04-18 NOTE — ASSESSMENT & PLAN NOTE
Patient tolerated most of the dialysis session today however at the end of the session the pressures were again noted to be high  Nephrology aware  Contacting vascular surgery

## 2018-04-18 NOTE — DIETARY
"Nutrition services: Day 0 of admit.  Debby Carrizales is a 37 y.o. female with admitting DX of Dialysis complication.    Consult received for poor PO, unplanned wt loss and supplements noted on admit screen.  Spoke with pt at bedside.  Pt reports a low appetite, which she states is her baseline.  Pt states that she was able to consume ~50% of her lunch tray.  Pt states a UBW of ~ 77 kg.  Per chart review, pt wt is 89.2 kg - no wt loss noted.  Discussed supplements with pt.  Pt did not wish to receive Boost Nutrition supplements here as she does not like the taste of them.  Obtained snack preferences for pt and will add to daily menu to encourage PO and ensure adequate intake.      Assessment:  Height: 165.1 cm (5' 5\")  Weight: 89.2 kg (196 lb 10.4 oz)  Body mass index is 32.72 kg/m².  Diet/Intake: Regular, Low Potassium    Recommendations/Plan:  1. Encourage intake of meals and snacks.  2. Document intake of all meals and snacks as % taken in ADL's to provide interdisciplinary communication across all shifts.   3. Monitor weight.  4. Nutrition rep will continue to see patient for ongoing meal and snack preferences.             "

## 2018-04-18 NOTE — CONSULTS
DATE OF SERVICE:  04/18/2018    REQUESTING PHYSICIAN:  Salo Fowler MD    REASON FOR CONSULTATION:  Management of chronic kidney disease, assessing the   need for urgent dialysis.    The patient seen and examined, medical records reviewed.    HISTORY OF PRESENT ILLNESS:  The patient is an unfortunate 37-year-old lady   with a past medical history significant for end-stage renal disease, undergoes   hemodialysis on a Monday, Wednesday, Friday through an AV graft in the right   upper arm, patient presented to the hospital yesterday after AV graft   dysfunction.  She was not able to have dialysis yesterday, so we were called   to manage her kidney disease and assist in the need for urgent dialysis.    The patient is complaining of some generalized pain.  She also seems to be   depressed, but no fever, no chills and no shortness of breath.    PAST MEDICAL HISTORY:  Significant for:  1.  End-stage renal disease.  2.  Lupus nephritis.  3.  Avascular necrosis of both hips.    ALLERGIES:  The list was reviewed.    SOCIAL HISTORY:  Patient with remote history of smoking.    FAMILY HISTORY:  No known renal disease.    REVIEW OF SYSTEMS:  Again, the patient is depressed and in pain.  All other   review of systems were negative.    PHYSICAL EXAMINATION:  GENERAL:  Patient is in no apparent distress.  VITAL SIGNS:  Show blood pressure of 114/74, heart rate was 82.  HEENT:  Normocephalic, atraumatic.  Sclerae is anicteric.  CHEST:  Normal.  LUNGS:  Clear to auscultation.  HEART:  S1, S2.  ABDOMEN:  Soft.  EXTREMITIES:  There is trace lower extremity edema.    Her recent labs were reviewed.    ASSESSMENT:  1.  End-stage renal disease.  2.  Hypertension.  3.  Anemia.  4.  Right upper arm arteriovenous graft dysfunction.    PLAN:  1.  I have consulted Dr. Ardon for assessment of her AV graft.  2.  Renal dose medications.  Avoid nephrotoxins.  Plan on dialysis whenever   her hemodialysis access is ready.  3.  As for the anemia, we  will use erythropoietin with dialysis.    Plan discussed in detail with Dr. Godinez.       ____________________________________     FADI NAJJAR, MD FN / ISSAC    DD:  04/18/2018 15:51:05  DT:  04/18/2018 16:32:35    D#:  [id number]  Job#:  [id number]

## 2018-04-18 NOTE — ED PROVIDER NOTES
ED Provider Note    ER PROVIDER NOTE        CHIEF COMPLAINT  Chief Complaint   Patient presents with   • Vascular Access Problem   • Flank Pain       HPI  Debby Carrizales is a 37 y.o. female who presents to the emergency department complaining of issues with her graft as well as flank pain. She reports that she usually gets dialysis Monday Wednesday Friday. This Monday when her right sided graft was accessed, there not able to perform dialysis and she had some swelling. She went back today similar episode. The clinic called her surgeon and she was told to come here to obtain axis. She reports she's had some increased bilateral flank pain. Some slight nausea. She denies any fevers chills or vomiting. Has noted no focal weakness numbness or tingling    REVIEW OF SYSTEMS  Pertinent positives includeFlank pain. Pertinent negatives include no vomiting or fever. See HPI for details. All other systems reviewed and are negative.    PAST MEDICAL HISTORY   has a past medical history of Arthritis; Avascular necrosis of bones of both hips (HCC) (10/10/2016); Clostridium difficile colitis (5/3/2011); Dialysis patient; ESBL (extended spectrum beta-lactamase) producing bacteria infection (8/25/2014); Fibromyalgia; Hypertension; Lupus; Pneumonia (); Psychiatric disorder; Pyelonephritis (11/21/2017); and Renal failure.    SURGICAL HISTORY   has a past surgical history that includes gastroscopy-endo (4/10/2011); sclerotheraphy (4/10/2011); gastroscopy-endo (4/18/2011); colonoscopy with biopsy (4/20/2011); gastroscopy-endo (4/27/2011); other (5/2011); other abdominal surgery; av fistula creation (9/9/2014); cath placement (9/9/2014); av fistula creation (11/14/2014); av fistula creation (2/3/2015); hip arthroplasty total (Right, 1/18/2016); closed reduction (Right, 7/5/2016); av fistula creation (Right, 7/12/2016); thrombectomy (Right, 8/20/2016); thrombectomy (Right, 8/21/2016); angioplasty balloon (8/21/2016);  "tracheostomy; av fistula creation (Right, 12/9/2017); and thrombectomy (12/9/2017).    FAMILY HISTORY  Family History   Problem Relation Age of Onset   • Heart Disease Mother    • Cancer Father        SOCIAL HISTORY  Social History     Social History   • Marital status: Single     Spouse name: N/A   • Number of children: N/A   • Years of education: N/A     Social History Main Topics   • Smoking status: Former Smoker     Packs/day: 0.50     Years: 18.00     Types: Cigarettes     Quit date: 6/13/2011   • Smokeless tobacco: Never Used      Comment: 1/2 ppd   • Alcohol use No      Comment: occ   • Drug use: Yes     Types: Marijuana      Comment: occ   • Sexual activity: Not on file     Other Topics Concern   •  Service No   • Blood Transfusions Yes   • Caffeine Concern No   • Occupational Exposure No   • Hobby Hazards No   • Sleep Concern Yes   • Stress Concern Yes   • Weight Concern Yes   • Special Diet Yes   • Back Care No   • Exercise Yes   • Bike Helmet No   • Seat Belt Yes   • Self-Exams Yes     Social History Narrative   • No narrative on file      History   Drug Use   • Types: Marijuana     Comment: occ       CURRENT MEDICATIONS  Home Medications    **Home medications have not yet been reviewed for this encounter**         ALLERGIES  Allergies   Allergen Reactions   • Ativan Anxiety     Patient becomes severely paranoid and agitated   • Morphine Itching     Tolerates Dilaudid   • Seasonal Runny Nose and Itching     Hay fever, sabiha brush       PHYSICAL EXAM  VITAL SIGNS: /103   Pulse 85   Temp 37.3 °C (99.1 °F)   Resp 17   Ht 1.651 m (5' 5\")   Wt 89.2 kg (196 lb 10.4 oz)   SpO2 95%   BMI 32.72 kg/m²   Pulse ox interpretation: I interpret this pulse ox as normal.    Constitutional: Alert in no apparent distress.  HENT: No signs of trauma, Bilateral external ears normal, Nose normal.   Eyes: Pupils are equal and reactive, Conjunctiva normal, Non-icteric.   Neck: Normal range of motion, No " tenderness, Supple, No stridor.   Lymphatic: No lymphadenopathy noted.   Cardiovascular: Regular rate and rhythm, no murmurs.   Thorax & Lungs: Normal breath sounds, No respiratory distress, No wheezing, work 2 left chest  Abdomen: Bowel sounds normal, Soft, No tenderness, No masses, No pulsatile masses. No peritoneal signs.  Skin: Warm, Dry, No erythema, No rash.   Back: No bony tenderness, No CVA tenderness.   Extremities: Multiple fistula sites into right upper extremity as well as existing graft site with some hematoma surrounding,   No cyanosis, Negative Samy's sign.  Musculoskeletal: Good range of motion in all major joints. No tenderness to palpation or major deformities noted.   Neurologic: Alert , Normal motor function, Normal sensory function, No focal deficits noted.   Psychiatric: Affect normal, Judgment normal, Mood normal.     DIAGNOSTIC STUDIES / PROCEDURES        LABS  Labs Reviewed   CBC WITH DIFFERENTIAL - Abnormal; Notable for the following:        Result Value    RBC 3.60 (*)     Hemoglobin 11.1 (*)     Hematocrit 34.4 (*)     MCHC 32.3 (*)     Platelet Count 132 (*)     All other components within normal limits   COMP METABOLIC PANEL - Abnormal; Notable for the following:     Co2 18 (*)     Anion Gap 15.0 (*)     Glucose 101 (*)     Bun 49 (*)     Creatinine 10.68 (*)     Calcium 8.3 (*)     All other components within normal limits   URINALYSIS,CULTURE IF INDICATED - Abnormal; Notable for the following:     Character Cloudy (*)     Ph 8.5 (*)     Glucose 100 (*)     Protein 100 (*)     Leukocyte Esterase Large (*)     Occult Blood Small (*)     All other components within normal limits   ESTIMATED GFR - Abnormal; Notable for the following:     GFR If  5 (*)     GFR If Non  4 (*)     All other components within normal limits   URINE MICROSCOPIC (W/UA) - Abnormal; Notable for the following:     RBC 5-10 (*)     Bacteria Few (*)     All other components within  "normal limits   LIPASE   URINE CULTURE(NEW)   BLOOD CULTURE    Narrative:     Per Hospital Policy: Only change Specimen Src: to \"Line\" if  specified by physician order.   BLOOD CULTURE   POC URINE PREGNANCY       All labs reviewed by me.    RADIOLOGY  HEMODIALYSIS ACCESS DUPLX   Final Result        The radiologist's interpretation of all radiological studies have been reviewed by me.    COURSE & MEDICAL DECISION MAKING  Nursing notes, VS, PMSFHx reviewed in chart.    9:16 PM Patient seen and examined at bedside. Patient will be treated with hydromorphone and Zofran. Ordered  labs, ultrasound to evaluate her symptoms.     Patient reevaluated, she is more comfortable, updated on results, plan for admission    Discussed case with Dr. Carvajal. They will evaluate the patient in the morning, will likely need surgical consultation in the morning to evaluate graft    Discussed case with Dr. Fowler for admission    Decision Making:  This is a 37 y.o. female presented with concerns of her dialysis catheter. It does appear to be stenotic and she'll be admitted for further evaluation of this potential new catheter placement for dialysis. She does not appear to need emergent dialysis tonight. Additionally does have some evidence of UTI and will be given ceftriaxone for this    Patient is admitted in stable condition    FINAL IMPRESSION  1. Dialysis complication, initial encounter         The note accurately reflects work and decisions made by me.  Neftaly Pérez  4/18/2018  12:13 AM      "

## 2018-04-18 NOTE — ED TRIAGE NOTES
Patient has an extensive history of lupus with CKD on dialysis for the last two years with chronic issues related to her fistula.  She states that yesterday they were unable to access her fistula went back in today and the same.  Sent here for further evaluation and workup.  Concerns it is clotted off.  No brui or thrill to the site.  Vitals stable.  Pain to the flank area on both side.

## 2018-04-18 NOTE — ASSESSMENT & PLAN NOTE
Accessed  Verified patient receives   Tabs of 20mg oxycodone/ month  Updated Admission meds and reconciled  Patient again tried to get more pain medication today-I stated she was already on twice of her home dose, she insisted this was not the case  I stated that I did review her records and found that she gets  tablets per month= 3 per day on average  She has the opportunity of receiving 6 tablets per day here. I told her I would not escalate therapy beyond this.

## 2018-04-18 NOTE — PROGRESS NOTES
Research Enrollment Information    This patient has been recruited in the study: Efficacy of prophylactic oral Vancomycin in preventing recurrent Clostridium Difficile infection in hospitalized patients requiring antibiotics.    Informed consent was obtained by Joie Saunders on 4/18/2018 at 2:25 PM before enrollment.   Subject Number:  06  Treatment assignment group: B       Key:       Treatment group A: vancomycin 125 mg PO QD concurrent with antibiotics       Treatment group B: vancomycin 125 mg PO BID concurrent with antibiotics       Treatment group C: no oral vancomycin with antibiotics   Study Group: [facility]  Melissa Berry, Olga Fuentes, Angela Conrad, Shanita Zambrano, Angelica Zuniga, Krysta Boyd, Hitesh Keller, Joie Saunders, Kitty Vallejo, Florencio Bullock, Jere Vidal, Mey Casillas    Contact Information:   [email or web address]    Study Description:   This study will be conducted according to [place] and international standards of Good Clinical Practice in accordance with applicable Federal regulations, International Conference on Harmonization guidelines, and institutional research policies and procedures. This study is a prospective, randomized, open label evaluation of the efficacy of concurrent use of PO Vancomycin with C. difficile inducing antibiotics in reducing the recurrence rate of C. Difficile diarrheal illness in patients hospitalized with any bacterial infection. The study will assess the efficacy of oral Vancomycin prophylaxis in preventing recurrent CDI in hospitalized patients with suspected or confirmed bacterial infections requiring oral or intravenous antibiotics. Approximately 300 subjects will be randomized to one of three treatment arms in a 1:1:1 ratio (Vancomycin 125 mg PO every 12 hours for duration of antibiotic therapy,  Vancomycin 125 mg PO every 24 hours for duration of antibiotic therapy, or no oral Vancomycin). Follow-up for endpoints will be done at 12 weeks following completion of antibiotic therapy.Oral Vancomycin will be supplied as solution (50 mg/mL) drawn up into oral syringes as unit doses for further dilution with 30 mL water. Inpatient and outpatient doses will be prepared according to institutional policies and procedures for non-sterile oral compounding. Inpatient study medication will be labeled and dispensed as study drug using a specific medication record built in the EHR. This identifies the medication as part of the study and prevents billing to patient and/or third party insurance. Upon discharge, the patient will be provided with unit dosed oral Vancomycin syringes in a quantity sufficient to complete the study treatment. A licensed pharmacist of the research pharmacy will dispense research medication according to the protocol, policies and procedures of the [facility], and in compliance with [place] Board of Pharmacy regulations for outpatient dispensing. Ordering will be restricted to research staff. The duration of study treatment will depend upon the prescribed antibiotic course (oral vancomycin administration will be concurrent with antibiotic therapy only). Patients and/or insurance will not be billed for oral Vancomycin. Patients may withdraw their consent to participate in the study at any time and for any reason.

## 2018-04-18 NOTE — CONSULTS
Vascular Consult Note:    Lesly Jansen  Date & Time note created:    4/18/2018   1:12 PM     Referred by: Dr. Godinez    Patient ID:   Name:             Debby Carrizales   YOB: 1980  Age:                 37 y.o.  female   MRN:               [id number]                                                             Reason for Consult:      Dysfunctional right arm AVG    History of Present Illness:    Debby Carrizales presents for repeated problems with her right arm arteriovenous graft.  She has a large bruise on the upper biceps and the access is somewhat pulsatile, consistent with an outflow stenosis.  She becomes somewhat tearful when I discuss the possibility of conscious sedation to accomplish a fistulogram and angioplasty.     Review of Systems:      Constitutional: Denies fevers, denies weight changes  Eyes: Denies changes in vision, no eye pain  Ears/Nose/Throat/Mouth: Denies nasal congestion or sore throat   Cardiovascular: Denies chest pain or  palpitations   Respiratory: Denies shortness of breath , Denies cough  Gastrointestinal/Hepatic: Denies abdominal pain, nausea, vomiting, diarrhea, constipation or GI bleeding   Genitourinary: Denies dysuria or frequency  Musculoskeletal/Rheum: Denies  joint pain and swelling, mild edema, no pain with walking  Skin: no rash, no history of open wounds  Neurological: Denies headache, confusion, memory loss or focal weakness/parasthesias  Psychiatric: Denies mood disorder   Endocrine: Denies thyroid problems  Heme/Oncology/Lymph Nodes: Denies enlarged lymph nodes, denies brusing or known bleeding disorder  All other systems were reviewed and are negative (AMA/CMS criteria)                Past Medical History:   Past Medical History:   Diagnosis Date   • Arthritis     all joints,r/t lupus   • Avascular necrosis of bones of both hips (HCC) 10/10/2016   • Clostridium difficile colitis 5/3/2011   • Dialysis patient    • ESBL (extended spectrum  beta-lactamase) producing bacteria infection 8/25/2014   • Fibromyalgia    • Hypertension    • Lupus    • Pneumonia    • Psychiatric disorder     anxiety, depression   • Pyelonephritis 11/21/2017   • Renal failure        Past Surgical History:  Past Surgical History:   Procedure Laterality Date   • AV FISTULA CREATION Right 12/9/2017    Procedure: AV FISTULA CREATION-ARM FOR GRAFT;  Surgeon: Lesly Jansen M.D.;  Location: [facility];  Service: General   • THROMBECTOMY  12/9/2017    Procedure: THROMBECTOMY;  Surgeon: Lesly Jansen M.D.;  Location: [facility];  Service: General   • THROMBECTOMY Right 8/21/2016    Procedure: THROMBECTOMY - right AV fistula graft with grams;  Surgeon: Shabbir Ardon M.D.;  Location: [facility];  Service:    • ANGIOPLASTY BALLOON  8/21/2016    Procedure: ANGIOPLASTY BALLOON;  Surgeon: Shabbir Ardon M.D.;  Location: [facility];  Service:    • THROMBECTOMY Right 8/20/2016    Procedure: THROMBECTOMY AV GRAFT;  Surgeon: Shabbir Ardon M.D.;  Location: [facility];  Service:    • AV FISTULA CREATION Right 7/12/2016    Procedure: AV FISTULA CREATION WITH GRAFT BRACHIAL AXILLARY;  Surgeon: Shabbir Ardon M.D.;  Location: [facility];  Service:    • CLOSED REDUCTION Right 7/5/2016    Procedure: CLOSED REDUCTION- Hip ;  Surgeon: Michael Holman M.D.;  Location: [facility];  Service:    • HIP ARTHROPLASTY TOTAL Right 1/18/2016    Procedure: HIP ARTHROPLASTY TOTAL;  Surgeon: Michael Holman M.D.;  Location: [facility];  Service:    • AV FISTULA CREATION  2/3/2015    Performed by Shabbir Ardon M.D. at [facility]   • AV FISTULA CREATION  11/14/2014    Performed by Shabbir Ardon M.D. at [facility]   • AV FISTULA CREATION  9/9/2014    Performed by Shabbir Ardon M.D. at [facility]   • CATH PLACEMENT  9/9/2014    Performed by  Shabbir Ardon M.D. at SURGERY [facility] ORS   • OTHER  5/2011    tracheostomy   • GASTROSCOPY-ENDO  4/27/2011    Performed by PALMIRA DOAN at ENDOSCOPY [facility] ORS   • COLONOSCOPY WITH BIOPSY  4/20/2011    Performed by ALCIRA CRUZ at ENDOSCOPY [facility] ORS   • GASTROSCOPY-ENDO  4/18/2011    Performed by ALCIRA CRUZ at ENDOSCOPY [facility] ORS   • GASTROSCOPY-ENDO  4/10/2011    Performed by SYED JURADO at ENDOSCOPY [facility] ORS   • SCLEROTHERAPHY  4/10/2011    Performed by SYED JURADO at ENDOSCOPY [facility] ORS   • OTHER ABDOMINAL SURGERY      kidney biopsy   • TRACHEOSTOMY         Current Outpatient Medications:  Current Facility-Administered Medications   Medication Dose Route Frequency Provider Last Rate Last Dose   • buPROPion SR (WELLBUTRIN-SR) tablet 150 mg  150 mg Oral QAM Salo Fowler M.D.   150 mg at 04/18/18 0834   • cinacalcet (SENSIPAR) tablet 30 mg  30 mg Oral QHS Salo Fowler M.D.   Stopped at 04/18/18 0225   • hydroxychloroquine (PLAQUENIL) tablet 200 mg  200 mg Oral QHS Salo Fowler M.D.   Stopped at 04/18/18 0225   • pregabalin (LYRICA) capsule 100 mg  100 mg Oral BID Salo Fowler M.D.   100 mg at 04/18/18 0834   • sevelamer carbonate (RENVELA) tablet 2,400 mg  2,400 mg Oral TID WITH MEALS Salo Fowler M.D.   Stopped at 04/18/18 0730   • tizanidine (ZANAFLEX) tablet 8 mg  8 mg Oral QHS Salo Fowler M.D.   8 mg at 04/18/18 0343   • traZODone (DESYREL) tablet 50 mg  50 mg Oral QHS Salo Fowler M.D.   50 mg at 04/18/18 0343   • cloNIDine (CATAPRES) tablet 0.1 mg  0.1 mg Oral Q6HRS PRN Salo Fowler M.D.       • ondansetron (ZOFRAN) syringe/vial injection 4 mg  4 mg Intravenous Q4HRS PRN Salo Fowler M.D.       • ondansetron (ZOFRAN ODT) dispertab 4 mg  4 mg Oral Q4HRS PRN Salo Fowler M.D.       • promethazine (PHENERGAN) tablet 12.5-25 mg  12.5-25 mg Oral Q4HRS PRN Salo Fowler M.D.   25 mg at 04/18/18 1022   •  promethazine (PHENERGAN) suppository 12.5-25 mg  12.5-25 mg Rectal Q4HRS PRN Salo Fowler M.D.       • prochlorperazine (COMPAZINE) injection 5-10 mg  5-10 mg Intravenous Q4HRS PRN Salo Fowler M.D.       • senna-docusate (PERICOLACE or SENOKOT S) 8.6-50 MG per tablet 2 Tab  2 Tab Oral BID Salo Fowler M.D.   Stopped at 04/18/18 0225    And   • polyethylene glycol/lytes (MIRALAX) PACKET 1 Packet  1 Packet Oral QDAY PRN Salo Fowler M.D.        And   • magnesium hydroxide (MILK OF MAGNESIA) suspension 30 mL  30 mL Oral QDAY PRN Salo Fowler M.D.        And   • bisacodyl (DULCOLAX) suppository 10 mg  10 mg Rectal QDAY PRN Salo Fowler M.D.       • acetaminophen (TYLENOL) tablet 650 mg  650 mg Oral Q6HRS PRN Salo Fowler M.D.       • heparin injection 5,000 Units  5,000 Units Subcutaneous Q8HRS Salo Fowler M.D.   Stopped at 04/18/18 0115   • cefTRIAXone (ROCEPHIN) syringe 2 g  2 g Intravenous Q24HRS Salo Fowler M.D.   Stopped at 04/18/18 0900   • oxyCODONE immediate release (ROXICODONE) tablet 10 mg  10 mg Oral Q4HRS PRN Santa Godinez M.D.   10 mg at 04/18/18 1047   • oxyCODONE immediate release (ROXICODONE) tablet 20 mg  20 mg Oral Q4HRS PRN Santa Godinez M.D.           Medication Allergy:  Allergies   Allergen Reactions   • Ativan Anxiety     Patient becomes severely paranoid and agitated   • Morphine Itching     Tolerates Dilaudid   • Seasonal Runny Nose and Itching     Hay fever, sabiha brush       Family History:  Family History   Problem Relation Age of Onset   • Heart Disease Mother    • Cancer Father        Social History:  Social History     Social History   • Marital status: Single     Spouse name: N/A   • Number of children: N/A   • Years of education: N/A     Occupational History   • Not on file.     Social History Main Topics   • Smoking status: Former Smoker     Packs/day: 0.50     Years: 18.00     Types: Cigarettes     Quit date: 6/13/2011   • Smokeless tobacco:  "Never Used      Comment: 1/2 ppd   • Alcohol use No      Comment: occ   • Drug use: No      Comment: occ   • Sexual activity: Not on file     Other Topics Concern   •  Service No   • Blood Transfusions Yes   • Caffeine Concern No   • Occupational Exposure No   • Hobby Hazards No   • Sleep Concern Yes   • Stress Concern Yes   • Weight Concern Yes   • Special Diet Yes   • Back Care No   • Exercise Yes   • Bike Helmet No   • Seat Belt Yes   • Self-Exams Yes     Social History Narrative   • No narrative on file         Physical Exam:  Vitals/ General Appearance:   Weight/BMI: Body mass index is 32.72 kg/m².  Blood pressure (!) 166/93, pulse 69, temperature 36.7 °C (98 °F), resp. rate 16, height 1.651 m (5' 5\"), weight 89.2 kg (196 lb 10.4 oz), SpO2 94 %.  Vitals:    04/18/18 0200 04/18/18 0400 04/18/18 0747 04/18/18 1154   BP: 143/88 148/99 114/74 (!) 166/93   Pulse: 68 72 64 69   Resp: 18 18 16 16   Temp: 37.2 °C (99 °F) 36.7 °C (98 °F) 36.7 °C (98 °F) 36.7 °C (98 °F)   SpO2: 98% 98% 94% 94%   Weight:       Height:         Oxygen Therapy:  Pulse Oximetry: 94 %, O2 (LPM): 0, O2 Delivery: None (Room Air)    Constitutional:   Well developed, obese, Well nourished, No acute distress  HENMT:  Normocephalic, Atraumatic, Oropharynx moist mucous membranes, No oral exudates, Nose normal.  No thyromegaly.  Eyes:  EOMI, Conjunctiva normal, No discharge.  Neck:  Normal range of motion, No cervical tenderness,  no JVD.  Cardiovascular:  Normal heart rate, Normal rhythm, No murmur.   right arm AVG is pulsatile with ecchymosis at the upper biceps  Lungs:  Breath sounds clear to auscultation bilaterally  Abdomen: Bowel sounds normal,Obese, Soft, No tenderness, No guarding, No rebound, No masses.   Skin: Warm, Dry, No erythema, No rash, no induration.  Neurologic: Alert & oriented x 3, No focal deficits noted, cranial nerves II through X are grossly intact.  Psychiatric: Anxious    Lab Data Review:  Recent Results (from the " past 24 hour(s))   CBC WITH DIFFERENTIAL    Collection Time: 04/17/18  5:37 PM   Result Value Ref Range    WBC 4.9 4.8 - 10.8 K/uL    RBC 3.60 (L) 4.20 - 5.40 M/uL    Hemoglobin 11.1 (L) 12.0 - 16.0 g/dL    Hematocrit 34.4 (L) 37.0 - 47.0 %    MCV 95.6 81.4 - 97.8 fL    MCH 30.8 27.0 - 33.0 pg    MCHC 32.3 (L) 33.6 - 35.0 g/dL    RDW 41.6 35.9 - 50.0 fL    Platelet Count 132 (L) 164 - 446 K/uL    MPV 10.3 9.0 - 12.9 fL    Neutrophils-Polys 60.40 44.00 - 72.00 %    Lymphocytes 27.70 22.00 - 41.00 %    Monocytes 8.30 0.00 - 13.40 %    Eosinophils 2.20 0.00 - 6.90 %    Basophils 1.00 0.00 - 1.80 %    Immature Granulocytes 0.40 0.00 - 0.90 %    Nucleated RBC 0.00 /100 WBC    Neutrophils (Absolute) 2.98 2.00 - 7.15 K/uL    Lymphs (Absolute) 1.37 1.00 - 4.80 K/uL    Monos (Absolute) 0.41 0.00 - 0.85 K/uL    Eos (Absolute) 0.11 0.00 - 0.51 K/uL    Baso (Absolute) 0.05 0.00 - 0.12 K/uL    Immature Granulocytes (abs) 0.02 0.00 - 0.11 K/uL    NRBC (Absolute) 0.00 K/uL   COMP METABOLIC PANEL    Collection Time: 04/17/18  5:37 PM   Result Value Ref Range    Sodium 138 135 - 145 mmol/L    Potassium 5.2 3.6 - 5.5 mmol/L    Chloride 105 96 - 112 mmol/L    Co2 18 (L) 20 - 33 mmol/L    Anion Gap 15.0 (H) 0.0 - 11.9    Glucose 101 (H) 65 - 99 mg/dL    Bun 49 (H) 8 - 22 mg/dL    Creatinine 10.68 (HH) 0.50 - 1.40 mg/dL    Calcium 8.3 (L) 8.5 - 10.5 mg/dL    AST(SGOT) 13 12 - 45 U/L    ALT(SGPT) 27 2 - 50 U/L    Alkaline Phosphatase 89 30 - 99 U/L    Total Bilirubin 0.5 0.1 - 1.5 mg/dL    Albumin 3.5 3.2 - 4.9 g/dL    Total Protein 6.6 6.0 - 8.2 g/dL    Globulin 3.1 1.9 - 3.5 g/dL    A-G Ratio 1.1 g/dL   LIPASE    Collection Time: 04/17/18  5:37 PM   Result Value Ref Range    Lipase 14 11 - 82 U/L   ESTIMATED GFR    Collection Time: 04/17/18  5:37 PM   Result Value Ref Range    GFR If African American 5 (A) >60 mL/min/1.73 m 2    GFR If Non African American 4 (A) >60 mL/min/1.73 m 2   URINALYSIS,CULTURE IF INDICATED    Collection Time:  04/17/18  8:30 PM   Result Value Ref Range    Color Yellow     Character Cloudy (A)     Specific Gravity 1.008 <1.035    Ph 8.5 (A) 5.0 - 8.0    Glucose 100 (A) Negative mg/dL    Ketones Negative Negative mg/dL    Protein 100 (A) Negative mg/dL    Bilirubin Negative Negative    Urobilinogen, Urine 0.2 Negative    Nitrite Negative Negative    Leukocyte Esterase Large (A) Negative    Occult Blood Small (A) Negative    Micro Urine Req Microscopic     Culture Indicated Yes UA Culture   URINE MICROSCOPIC (W/UA)    Collection Time: 04/17/18  8:30 PM   Result Value Ref Range    WBC Packed WBC /hpf    RBC 5-10 (A) /hpf    Bacteria Few (A) None /hpf    Epithelial Cells Few /hpf    Hyaline Cast 0-2 /lpf   Hemoglobin A1c    Collection Time: 04/18/18  3:50 AM   Result Value Ref Range    Glycohemoglobin 5.3 0.0 - 5.6 %    Est Avg Glucose 105 mg/dL   BASIC METABOLIC PANEL    Collection Time: 04/18/18  3:50 AM   Result Value Ref Range    Sodium 139 135 - 145 mmol/L    Potassium 4.9 3.6 - 5.5 mmol/L    Chloride 105 96 - 112 mmol/L    Co2 20 20 - 33 mmol/L    Glucose 85 65 - 99 mg/dL    Bun 54 (H) 8 - 22 mg/dL    Creatinine 10.55 (HH) 0.50 - 1.40 mg/dL    Calcium 8.2 (L) 8.5 - 10.5 mg/dL    Anion Gap 14.0 (H) 0.0 - 11.9   CBC WITH DIFFERENTIAL    Collection Time: 04/18/18  3:50 AM   Result Value Ref Range    WBC 4.6 (L) 4.8 - 10.8 K/uL    RBC 3.28 (L) 4.20 - 5.40 M/uL    Hemoglobin 10.2 (L) 12.0 - 16.0 g/dL    Hematocrit 31.6 (L) 37.0 - 47.0 %    MCV 96.3 81.4 - 97.8 fL    MCH 31.1 27.0 - 33.0 pg    MCHC 32.3 (L) 33.6 - 35.0 g/dL    RDW 42.2 35.9 - 50.0 fL    Platelet Count 126 (L) 164 - 446 K/uL    MPV 10.4 9.0 - 12.9 fL    Neutrophils-Polys 49.60 44.00 - 72.00 %    Lymphocytes 36.80 22.00 - 41.00 %    Monocytes 10.70 0.00 - 13.40 %    Eosinophils 2.00 0.00 - 6.90 %    Basophils 0.70 0.00 - 1.80 %    Immature Granulocytes 0.20 0.00 - 0.90 %    Nucleated RBC 0.00 /100 WBC    Neutrophils (Absolute) 2.26 2.00 - 7.15 K/uL    Lymphs  (Absolute) 1.68 1.00 - 4.80 K/uL    Monos (Absolute) 0.49 0.00 - 0.85 K/uL    Eos (Absolute) 0.09 0.00 - 0.51 K/uL    Baso (Absolute) 0.03 0.00 - 0.12 K/uL    Immature Granulocytes (abs) 0.01 0.00 - 0.11 K/uL    NRBC (Absolute) 0.00 K/uL   ESTIMATED GFR    Collection Time: 04/18/18  3:50 AM   Result Value Ref Range    GFR If African American 5 (A) >60 mL/min/1.73 m 2    GFR If Non African American 4 (A) >60 mL/min/1.73 m 2       MDM (Assessment and Plan):     Patient Active Problem List    Diagnosis Date Noted   • A-V fistula (CMS-HCC) 04/21/2017     Priority: High   • Medical non-compliance 01/04/2017     Priority: High   • ESRD (end stage renal disease) on dialysis (CMS-HCC) 10/10/2016     Priority: High   • ESBL (extended spectrum beta-lactamase) producing bacteria infection 08/25/2014     Priority: High   • Lupus (systemic lupus erythematosus) (CMS-HCC) 01/09/2016     Priority: Medium   • Chronic lupus nephritis (CMS-HCC) 12/01/2013     Priority: Medium   • DAVI (obstructive sleep apnea) 07/18/2011     Priority: Medium   • Depression 07/25/2017     Priority: Low   • Anemia 07/19/2017     Priority: Low   • Low back pain 07/19/2017     Priority: Low   • Drug-seeking behavior 08/02/2015     Priority: Low   • Thrombocytopenia (CMS-HCC) 01/19/2015     Priority: Low   • Opioid dependence (CMS-HCC) 12/05/2013     Priority: Low   • Fibromyalgia 02/10/2012     Priority: Low   • Anxiety 02/10/2012     Priority: Low   • UTI (urinary tract infection) 04/18/2018   • Obesity (BMI 30-39.9) 02/09/2018   • Hypertension 02/06/2017   • Avascular necrosis of bones of both hips ([facility]) 10/10/2016   • Vitamin D deficiency disease 12/11/2013       Impression:  Ms. Tamayo presents with a difficult access.  On physical exam, the graft is somewhat pulsatile and the most usual problem is an outflow stenosis, although the non-invasive study suggests something else.  I will arrange for fistulogram with angioplasty and hope we can accomplish  this in the OR under general anesthesia, per the patient's wishes.    Plan:  Fistulogram and angioplasty  Lesly Jansen M.D.

## 2018-04-18 NOTE — H&P
Hospital Medicine History and Physical    Date of Service  4/18/2018    Chief Complaint  Chief Complaint   Patient presents with   • Vascular Access Problem   • Flank Pain       History of Presenting Illness  37 y.o. female with history of ESRD on HD MWF via graft in right arm, was in her usual state of health until Thursday prior to this admission, when she began to have fever and chills associated with bilateral, L > R flank pain, and dysuria.  No exacerbating or alleviating factors.  She was able to have HD in regular fashion the next day, but continued to have the above symptoms.  Unfortunately on the day prior to admission, while at HD, she had trouble achieving access, with inflammation of her arm.  She went to a make-up session the day prior to admission which was also unsuccessful.  She was subsequently sent to the hospital for further evaluation and repair of her access.      Primary Care Physician  Sushila Manzano M.D.    Consultants  Nephrology     Code Status  Full code     Review of Systems  Review of Systems   Constitutional: Positive for chills, fever and malaise/fatigue.   HENT: Negative.    Eyes: Negative.    Respiratory: Negative.    Cardiovascular: Negative.    Gastrointestinal: Negative.    Genitourinary: Positive for dysuria, flank pain and frequency.   Musculoskeletal: Positive for back pain. Negative for myalgias.   Skin: Negative.    Neurological: Negative.    Endo/Heme/Allergies: Negative.    Psychiatric/Behavioral: Negative.         Past Medical History  Past Medical History:   Diagnosis Date   • Pyelonephritis 11/21/2017   • Avascular necrosis of bones of both hips (HCC) 10/10/2016   • ESBL (extended spectrum beta-lactamase) producing bacteria infection 8/25/2014   • Pneumonia    • Clostridium difficile colitis 5/3/2011   • Arthritis     all joints,r/t lupus   • Dialysis patient    • Fibromyalgia    • Hypertension    • Lupus    • Psychiatric disorder     anxiety, depression   •  Renal failure        Surgical History  Past Surgical History:   Procedure Laterality Date   • AV FISTULA CREATION Right 12/9/2017    Procedure: AV FISTULA CREATION-ARM FOR GRAFT;  Surgeon: Lesly Jansen M.D.;  Location: SURGERY [facility];  Service: General   • THROMBECTOMY  12/9/2017    Procedure: THROMBECTOMY;  Surgeon: Lesly Jansen M.D.;  Location: SURGERY [facility];  Service: General   • THROMBECTOMY Right 8/21/2016    Procedure: THROMBECTOMY - right AV fistula graft with grams;  Surgeon: Shabbir Ardon M.D.;  Location: SURGERY [facility];  Service:    • ANGIOPLASTY BALLOON  8/21/2016    Procedure: ANGIOPLASTY BALLOON;  Surgeon: Shabbir Ardon M.D.;  Location: [facility];  Service:    • THROMBECTOMY Right 8/20/2016    Procedure: THROMBECTOMY AV GRAFT;  Surgeon: Shabbir Ardon M.D.;  Location: [facility];  Service:    • AV FISTULA CREATION Right 7/12/2016    Procedure: AV FISTULA CREATION WITH GRAFT BRACHIAL AXILLARY;  Surgeon: Shabbir Ardon M.D.;  Location: SURGERY [facility];  Service:    • CLOSED REDUCTION Right 7/5/2016    Procedure: CLOSED REDUCTION- Hip ;  Surgeon: Michael Holman M.D.;  Location: [facility];  Service:    • HIP ARTHROPLASTY TOTAL Right 1/18/2016    Procedure: HIP ARTHROPLASTY TOTAL;  Surgeon: Michael Holman M.D.;  Location: [facility];  Service:    • AV FISTULA CREATION  2/3/2015    Performed by Shabbir Ardon M.D. at SURGERY [facility]   • AV FISTULA CREATION  11/14/2014    Performed by Shabbir Ardon M.D. at SURGERY [facility]   • AV FISTULA CREATION  9/9/2014    Performed by Shabbir Ardon M.D. at SURGERY [facility]   • CATH PLACEMENT  9/9/2014    Performed by Shabbir Ardon M.D. at [facility]   • OTHER  5/2011    tracheostomy   • GASTROSCOPY-ENDO  4/27/2011    Performed by PALMIRA DOAN at ENDOSCOPY [facility]   • COLONOSCOPY WITH BIOPSY   4/20/2011    Performed by ALCIRA CRUZ at ENDOSCOPY [facility]   • GASTROSCOPY-ENDO  4/18/2011    Performed by ALCIRA CRUZ at ENDOSCOPY [facility]   • GASTROSCOPY-ENDO  4/10/2011    Performed by SYED JURADO at ENDOSCOPY [facility]   • SCLEROTHERAPHY  4/10/2011    Performed by SYED JURADO at ENDOSCOPY [facility]   • OTHER ABDOMINAL SURGERY      kidney biopsy   • TRACHEOSTOMY         Medications  No current facility-administered medications on file prior to encounter.      Current Outpatient Prescriptions on File Prior to Encounter   Medication Sig Dispense Refill   • sevelamer carbonate (RENVELA) 800 MG Tab tablet Take 2,400 mg by mouth 3 times a day, with meals.     • hydrOXYzine HCl (ATARAX) 50 MG Tab Take 1 Tab by mouth 3 times a day as needed for Anxiety. 90 Tab 0   • lidocaine (LIDODERM) 5 % Patch Apply 1 Patch to skin as directed every 24 hours. Apply to back     • ferrous sulfate 325 (65 FE) MG tablet Take 325 mg by mouth every day.     • ascorbic acid (ASCORBIC ACID) 500 MG Tab Take 500 mg by mouth every day.     • buPROPion (WELLBUTRIN XL) 150 MG XL tablet Take 150 mg by mouth every morning.     • hydroxychloroquine (PLAQUENIL) 200 MG Tab Take 200 mg by mouth every bedtime.     • cholecalciferol (VITAMIN D3) 5000 UNIT Cap Take 10,000 Units by mouth every day.     • trazodone (DESYREL) 50 MG Tab Take 1 Tab by mouth every bedtime. 30 Tab 0   • tizanidine (ZANAFLEX) 4 MG Tab Take 2 Tabs by mouth every bedtime. 60 Tab 0       Family History  Family History   Problem Relation Age of Onset   • Heart Disease Mother    • Cancer Father        Social History  Social History   Substance Use Topics   • Smoking status: Former Smoker     Packs/day: 0.50     Years: 18.00     Types: Cigarettes     Quit date: 6/13/2011   • Smokeless tobacco: Never Used      Comment: 1/2 ppd   • Alcohol use No      Comment: occ       Allergies  Allergies   Allergen Reactions   • Ativan  Anxiety     Patient becomes severely paranoid and agitated   • Morphine Itching     Tolerates Dilaudid   • Seasonal Runny Nose and Itching     Hay fever, sabiha brush        Physical Exam  Laboratory   Hemodynamics  Temp (24hrs), Av.4 °C (99.3 °F), Min:37.3 °C (99.1 °F), Max:37.5 °C (99.5 °F)   Temperature: 37.3 °C (99.1 °F)  Pulse  Av  Min: 85  Max: 94    Blood Pressure: 143/103, NIBP: 101/59      Respiratory      Respiration: 17, Pulse Oximetry: 97 %             Physical Exam   Constitutional: She is oriented to person, place, and time. She appears well-developed and well-nourished. No distress.   HENT:   Head: Normocephalic and atraumatic.   Eyes: Conjunctivae are normal. Pupils are equal, round, and reactive to light.   Neck: Normal range of motion. Neck supple. No tracheal deviation present. No thyromegaly present.   Cardiovascular: Normal rate, regular rhythm and normal heart sounds.  Exam reveals no gallop and no friction rub.    No murmur heard.  Pulmonary/Chest: Effort normal and breath sounds normal. No respiratory distress. She has no wheezes. She has no rales.   Abdominal: Soft. Bowel sounds are normal. She exhibits no distension. There is tenderness. There is no rebound.   Musculoskeletal: Normal range of motion. She exhibits deformity. She exhibits no edema.   RUE graft in place, erythema at access sites, multiple bandages overlying    Lymphadenopathy:     She has no cervical adenopathy.   Neurological: She is alert and oriented to person, place, and time. No cranial nerve deficit.   Skin: Skin is warm and dry. She is not diaphoretic.   Psychiatric: She has a normal mood and affect.   Nursing note and vitals reviewed.      Recent Labs      18   1737   WBC  4.9   RBC  3.60*   HEMOGLOBIN  11.1*   HEMATOCRIT  34.4*   MCV  95.6   MCH  30.8   MCHC  32.3*   RDW  41.6   PLATELETCT  132*   MPV  10.3     Recent Labs      18   1737   SODIUM  138   POTASSIUM  5.2   CHLORIDE  105   CO2  18*    GLUCOSE  101*   BUN  49*   CREATININE  10.68*   CALCIUM  8.3*     Recent Labs      04/17/18   1737   ALTSGPT  27   ASTSGOT  13   ALKPHOSPHAT  89   TBILIRUBIN  0.5   LIPASE  14   GLUCOSE  101*                 Lab Results   Component Value Date    TROPONINI <0.01 11/21/2017     Urinalysis:    Lab Results  Component Value Date/Time   SPECGRAVITY 1.008 04/17/2018 2030   GLUCOSEUR 100 (A) 04/17/2018 2030   KETONES Negative 04/17/2018 2030   NITRITE Negative 04/17/2018 2030   WBCURINE Packed WBC 04/17/2018 2030   RBCURINE 5-10 (A) 04/17/2018 2030   BACTERIA Few (A) 04/17/2018 2030   EPITHELCELL Few 04/17/2018 2030        Imaging  No new imaging for review    Assessment/Plan     I anticipate this patient will require at least two midnights for appropriate medical management, necessitating inpatient admission.    * UTI (urinary tract infection)   Assessment & Plan    Start antibiotic therapy, monitor culture results.          ESRD (end stage renal disease) on dialysis (CMS-HCC)- (present on admission)   Assessment & Plan    Missed HD session yesterday, ineffective retry today due to graft failure/stenosis.  Plan for nephrology evaluation, possible surgery to repair graft, ?placement of temporary access.  Keep NPO for the same.          Obesity (BMI 30-39.9)- (present on admission)   Assessment & Plan    Body mass index is 32.72 kg/m².          Hypertension- (present on admission)   Assessment & Plan    Controlled with current medication regimen.  Monitor.         Anemia- (present on admission)   Assessment & Plan    Normocytic and likely of chronic kidney disease.         Thrombocytopenia (CMS-HCC)- (present on admission)   Assessment & Plan    Without current evidence of bleed.  Monitor.         Fibromyalgia- (present on admission)   Assessment & Plan    Controlled with lyrica.              VTE prophylaxis: SCD, heparin.

## 2018-04-19 ENCOUNTER — APPOINTMENT (OUTPATIENT)
Dept: RADIOLOGY | Facility: MEDICAL CENTER | Age: 38
DRG: 252 | End: 2018-04-19
Attending: SURGERY
Payer: MEDICARE

## 2018-04-19 LAB
ANION GAP SERPL CALC-SCNC: 12 MMOL/L (ref 0–11.9)
BACTERIA UR CULT: NORMAL
BASOPHILS # BLD AUTO: 1.4 % (ref 0–1.8)
BASOPHILS # BLD: 0.05 K/UL (ref 0–0.12)
BUN SERPL-MCNC: 62 MG/DL (ref 8–22)
CALCIUM SERPL-MCNC: 8.2 MG/DL (ref 8.5–10.5)
CHLORIDE SERPL-SCNC: 104 MMOL/L (ref 96–112)
CO2 SERPL-SCNC: 22 MMOL/L (ref 20–33)
CREAT SERPL-MCNC: 12.23 MG/DL (ref 0.5–1.4)
EOSINOPHIL # BLD AUTO: 0.08 K/UL (ref 0–0.51)
EOSINOPHIL NFR BLD: 2.2 % (ref 0–6.9)
ERYTHROCYTE [DISTWIDTH] IN BLOOD BY AUTOMATED COUNT: 43.2 FL (ref 35.9–50)
GLUCOSE SERPL-MCNC: 82 MG/DL (ref 65–99)
HCG SERPL QL: NEGATIVE
HCT VFR BLD AUTO: 32.9 % (ref 37–47)
HGB BLD-MCNC: 10.2 G/DL (ref 12–16)
IMM GRANULOCYTES # BLD AUTO: 0.01 K/UL (ref 0–0.11)
IMM GRANULOCYTES NFR BLD AUTO: 0.3 % (ref 0–0.9)
INR PPP: 1.03 (ref 0.87–1.13)
LYMPHOCYTES # BLD AUTO: 1.28 K/UL (ref 1–4.8)
LYMPHOCYTES NFR BLD: 36 % (ref 22–41)
MCH RBC QN AUTO: 30.2 PG (ref 27–33)
MCHC RBC AUTO-ENTMCNC: 31 G/DL (ref 33.6–35)
MCV RBC AUTO: 97.3 FL (ref 81.4–97.8)
MONOCYTES # BLD AUTO: 0.46 K/UL (ref 0–0.85)
MONOCYTES NFR BLD AUTO: 12.9 % (ref 0–13.4)
NEUTROPHILS # BLD AUTO: 1.68 K/UL (ref 2–7.15)
NEUTROPHILS NFR BLD: 47.2 % (ref 44–72)
NRBC # BLD AUTO: 0 K/UL
NRBC BLD-RTO: 0 /100 WBC
PLATELET # BLD AUTO: 118 K/UL (ref 164–446)
PMV BLD AUTO: 10.8 FL (ref 9–12.9)
POTASSIUM SERPL-SCNC: 5.1 MMOL/L (ref 3.6–5.5)
PROTHROMBIN TIME: 13.2 SEC (ref 12–14.6)
RBC # BLD AUTO: 3.38 M/UL (ref 4.2–5.4)
SIGNIFICANT IND 70042: NORMAL
SITE SITE: NORMAL
SODIUM SERPL-SCNC: 138 MMOL/L (ref 135–145)
SOURCE SOURCE: NORMAL
WBC # BLD AUTO: 3.6 K/UL (ref 4.8–10.8)

## 2018-04-19 PROCEDURE — B51W1ZZ FLUOROSCOPY OF DIALYSIS SHUNT/FISTULA USING LOW OSMOLAR CONTRAST: ICD-10-PCS | Performed by: SURGERY

## 2018-04-19 PROCEDURE — 700111 HCHG RX REV CODE 636 W/ 250 OVERRIDE (IP)

## 2018-04-19 PROCEDURE — 700101 HCHG RX REV CODE 250

## 2018-04-19 PROCEDURE — 770006 HCHG ROOM/CARE - MED/SURG/GYN SEMI*

## 2018-04-19 PROCEDURE — 700111 HCHG RX REV CODE 636 W/ 250 OVERRIDE (IP): Performed by: HOSPITALIST

## 2018-04-19 PROCEDURE — 84703 CHORIONIC GONADOTROPIN ASSAY: CPT

## 2018-04-19 PROCEDURE — 700117 HCHG RX CONTRAST REV CODE 255: Performed by: SURGERY

## 2018-04-19 PROCEDURE — 80048 BASIC METABOLIC PNL TOTAL CA: CPT

## 2018-04-19 PROCEDURE — 85025 COMPLETE CBC W/AUTO DIFF WBC: CPT

## 2018-04-19 PROCEDURE — 700102 HCHG RX REV CODE 250 W/ 637 OVERRIDE(OP): Performed by: HOSPITALIST

## 2018-04-19 PROCEDURE — 36902 INTRO CATH DIALYSIS CIRCUIT: CPT

## 2018-04-19 PROCEDURE — 160002 HCHG RECOVERY MINUTES (STAT)

## 2018-04-19 PROCEDURE — 85610 PROTHROMBIN TIME: CPT

## 2018-04-19 PROCEDURE — A9270 NON-COVERED ITEM OR SERVICE: HCPCS | Performed by: INTERNAL MEDICINE

## 2018-04-19 PROCEDURE — A9270 NON-COVERED ITEM OR SERVICE: HCPCS | Performed by: HOSPITALIST

## 2018-04-19 PROCEDURE — A9270 NON-COVERED ITEM OR SERVICE: HCPCS

## 2018-04-19 PROCEDURE — 99232 SBSQ HOSP IP/OBS MODERATE 35: CPT | Performed by: INTERNAL MEDICINE

## 2018-04-19 PROCEDURE — 057Y3ZZ DILATION OF UPPER VEIN, PERCUTANEOUS APPROACH: ICD-10-PCS | Performed by: SURGERY

## 2018-04-19 PROCEDURE — 700102 HCHG RX REV CODE 250 W/ 637 OVERRIDE(OP): Performed by: INTERNAL MEDICINE

## 2018-04-19 PROCEDURE — 700102 HCHG RX REV CODE 250 W/ 637 OVERRIDE(OP)

## 2018-04-19 RX ORDER — OXYCODONE HCL 5 MG/5 ML
SOLUTION, ORAL ORAL
Status: DISPENSED
Start: 2018-04-19 | End: 2018-04-20

## 2018-04-19 RX ORDER — LIDOCAINE HYDROCHLORIDE 10 MG/ML
INJECTION, SOLUTION INFILTRATION; PERINEURAL
Status: COMPLETED
Start: 2018-04-19 | End: 2018-04-19

## 2018-04-19 RX ADMIN — Medication 125 MG: at 21:20

## 2018-04-19 RX ADMIN — OXYCODONE HYDROCHLORIDE 20 MG: 10 TABLET ORAL at 04:52

## 2018-04-19 RX ADMIN — PROMETHAZINE HYDROCHLORIDE 25 MG: 25 TABLET ORAL at 09:08

## 2018-04-19 RX ADMIN — Medication 125 MG: at 08:57

## 2018-04-19 RX ADMIN — OXYCODONE HYDROCHLORIDE 20 MG: 10 TABLET ORAL at 13:02

## 2018-04-19 RX ADMIN — STANDARDIZED SENNA CONCENTRATE AND DOCUSATE SODIUM 2 TABLET: 8.6; 5 TABLET, FILM COATED ORAL at 09:00

## 2018-04-19 RX ADMIN — OXYCODONE HYDROCHLORIDE 20 MG: 10 TABLET ORAL at 00:43

## 2018-04-19 RX ADMIN — BUPROPION HYDROCHLORIDE 150 MG: 150 TABLET, EXTENDED RELEASE ORAL at 08:57

## 2018-04-19 RX ADMIN — LIDOCAINE HYDROCHLORIDE: 10 INJECTION, SOLUTION INFILTRATION; PERINEURAL at 17:30

## 2018-04-19 RX ADMIN — IODIXANOL 22 ML: 270 INJECTION, SOLUTION INTRAVASCULAR at 19:00

## 2018-04-19 RX ADMIN — STANDARDIZED SENNA CONCENTRATE AND DOCUSATE SODIUM 2 TABLET: 8.6; 5 TABLET, FILM COATED ORAL at 21:17

## 2018-04-19 RX ADMIN — OXYCODONE HYDROCHLORIDE 20 MG: 10 TABLET ORAL at 08:57

## 2018-04-19 RX ADMIN — TRAZODONE HYDROCHLORIDE 50 MG: 50 TABLET ORAL at 21:17

## 2018-04-19 RX ADMIN — OXYCODONE HYDROCHLORIDE 20 MG: 10 TABLET ORAL at 20:57

## 2018-04-19 RX ADMIN — CINACALCET HYDROCHLORIDE 30 MG: 30 TABLET, COATED ORAL at 21:18

## 2018-04-19 RX ADMIN — OXYCODONE HYDROCHLORIDE 20 MG: 10 TABLET ORAL at 17:12

## 2018-04-19 RX ADMIN — CEFTRIAXONE 2 G: 2 INJECTION, POWDER, FOR SOLUTION INTRAMUSCULAR; INTRAVENOUS at 08:57

## 2018-04-19 RX ADMIN — PREGABALIN 100 MG: 100 CAPSULE ORAL at 08:57

## 2018-04-19 RX ADMIN — HYDROXYCHLOROQUINE SULFATE 200 MG: 200 TABLET, FILM COATED ORAL at 21:18

## 2018-04-19 RX ADMIN — TIZANIDINE 8 MG: 4 TABLET ORAL at 21:17

## 2018-04-19 RX ADMIN — HEPARIN SODIUM 5000 UNITS: 5000 INJECTION, SOLUTION INTRAVENOUS; SUBCUTANEOUS at 21:16

## 2018-04-19 RX ADMIN — PREGABALIN 100 MG: 100 CAPSULE ORAL at 21:17

## 2018-04-19 ASSESSMENT — ENCOUNTER SYMPTOMS
DIZZINESS: 0
FEVER: 0
SHORTNESS OF BREATH: 1
BACK PAIN: 1
MYALGIAS: 1
NERVOUS/ANXIOUS: 1
COUGH: 0
VOMITING: 0
HEADACHES: 0
DIARRHEA: 0
INSOMNIA: 1
SHORTNESS OF BREATH: 0
CONSTIPATION: 0
CHILLS: 0
NAUSEA: 0
ABDOMINAL PAIN: 0

## 2018-04-19 ASSESSMENT — PAIN SCALES - GENERAL
PAINLEVEL_OUTOF10: 5
PAINLEVEL_OUTOF10: 8
PAINLEVEL_OUTOF10: 0
PAINLEVEL_OUTOF10: 7
PAINLEVEL_OUTOF10: 5
PAINLEVEL_OUTOF10: 0
PAINLEVEL_OUTOF10: 8
PAINLEVEL_OUTOF10: 4
PAINLEVEL_OUTOF10: 0
PAINLEVEL_OUTOF10: 3
PAINLEVEL_OUTOF10: 5
PAINLEVEL_OUTOF10: 4
PAINLEVEL_OUTOF10: 5
PAINLEVEL_OUTOF10: 0
PAINLEVEL_OUTOF10: 7
PAINLEVEL_OUTOF10: 9
PAINLEVEL_OUTOF10: 0

## 2018-04-19 NOTE — PROGRESS NOTES
Hospital Medicine Progress Note, Adult, Complex               Author: Fadi Najjar Date & Time created: 4/19/2018  3:07 PM     Interval History:  Pt with ESRD, SLE, presented with AVG dysfunction  Pt is doing better  Plan for angioplasty is pending  Review of Systems:  Review of Systems   Respiratory: Negative for cough and shortness of breath.    Cardiovascular: Positive for leg swelling. Negative for chest pain.   Gastrointestinal: Negative for nausea and vomiting.   Musculoskeletal: Positive for joint pain and myalgias.       Physical Exam:  Physical Exam   Constitutional: She is oriented to person, place, and time. No distress.   HENT:   Right Ear: External ear normal.   Left Ear: External ear normal.   Nose: Nose normal.   Eyes: Conjunctivae are normal. Right eye exhibits no discharge. Left eye exhibits no discharge. No scleral icterus.   Cardiovascular: Normal rate and regular rhythm.    No murmur heard.  Pulmonary/Chest: Effort normal and breath sounds normal. No respiratory distress. She has no wheezes.   Abdominal: Soft. Bowel sounds are normal. She exhibits no distension. There is no tenderness.   Musculoskeletal: She exhibits edema.   Neurological: She is alert and oriented to person, place, and time.   Skin: Skin is warm. She is not diaphoretic.   Psychiatric: She has a normal mood and affect. Her behavior is normal.   Nursing note and vitals reviewed.      Labs:        Invalid input(s): [id number]      Recent Labs      04/17/18 1737 04/18/18   0350  04/19/18   0415   SODIUM  138  139  138   POTASSIUM  5.2  4.9  5.1   CHLORIDE  105  105  104   CO2  18*  20  22   BUN  49*  54*  62*   CREATININE  10.68*  10.55*  12.23*   CALCIUM  8.3*  8.2*  8.2*     Recent Labs      04/17/18   1737 04/18/18   0350  04/19/18   0415   ALTSGPT  27   --    --    ASTSGOT  13   --    --    ALKPHOSPHAT  89   --    --    TBILIRUBIN  0.5   --    --    LIPASE  14   --    --    GLUCOSE  101*  85  82     Recent Labs       18   1737  18   0350  18   0415   RBC  3.60*  3.28*  3.38*   HEMOGLOBIN  11.1*  10.2*  10.2*   HEMATOCRIT  34.4*  31.6*  32.9*   PLATELETCT  132*  126*  118*     Recent Labs      18   1737  18   0350  18   0415   WBC  4.9  4.6*  3.6*   NEUTSPOLYS  60.40  49.60  47.20   LYMPHOCYTES  27.70  36.80  36.00   MONOCYTES  8.30  10.70  12.90   EOSINOPHILS  2.20  2.00  2.20   BASOPHILS  1.00  0.70  1.40   ASTSGOT  13   --    --    ALTSGPT  27   --    --    ALKPHOSPHAT  89   --    --    TBILIRUBIN  0.5   --    --            Hemodynamics:  Temp (24hrs), Av.6 °C (97.8 °F), Min:36.2 °C (97.1 °F), Max:36.9 °C (98.4 °F)  Temperature: 36.6 °C (97.8 °F)  Pulse  Av.9  Min: 64  Max: 94   Blood Pressure: 120/71     Respiratory:    Respiration: 16, Pulse Oximetry: 91 %        RLL Breath Sounds: Diminished, LLL Breath Sounds: Diminished  Fluids:  No intake or output data in the 24 hours ending 18 1507     GI/Nutrition:  Orders Placed This Encounter   Procedures   • DIET NPO     Standing Status:   Standing     Number of Occurrences:   1     Order Specific Question:   Restrict to:     Answer:   Sips with Medications [3]     Medical Decision Making, by Problem:  Active Hospital Problems    Diagnosis   • *UTI (urinary tract infection) [N39.0]   • ESRD (end stage renal disease) on dialysis (CMS-HCC) [N18.6, Z99.2]   • Lupus (systemic lupus erythematosus) (CMS-HCC) [M32.9]   • Anemia [D64.9]   • Thrombocytopenia (CMS-HCC) [D69.6]   • Opioid dependence (CMS-HCC) [F11.20]   • Fibromyalgia [M79.7]   • Research study patient [Z00.6]   • Obesity (BMI 30-39.9) [E66.9]   • Hypertension [I10]       Quality-Core Measures  1 ESRD  2 AVG dysfunction  3 Anemia  4 chronic pain  Plan  plan for HD later today after angioplasty  Renal dose all meds  Avoid nephrotoxins  Prognosis guarded  D/W Dr Godinez

## 2018-04-19 NOTE — PROGRESS NOTES
[facility]    Date of Service: 2018    Chief Complaint  37 y.o. female with ESRD 2/2 SLE having issues w/ her HD graft presents after missing 2 sessions 2/2 \"high pressures\" in graft and w/ UTI.      Interval Problem Update  Discussed w/ nephrology  Discussed w/ Dr Jansen  Patient aware of POC  Discussed pain meds w/ RN    Consultants/Specialty  Nephrology  Vascular surgery    Disposition  Anticipate home        Review of Systems   Constitutional: Negative for chills and fever.   Respiratory: Positive for shortness of breath (2/2 pain). Negative for cough.    Cardiovascular: Negative for chest pain.   Gastrointestinal: Negative for abdominal pain, constipation, diarrhea, nausea and vomiting.   Genitourinary: Positive for flank pain. Negative for dysuria, frequency and urgency.   Musculoskeletal: Positive for back pain.        Some pain and brusing from graft   Neurological: Negative for dizziness and headaches.   Psychiatric/Behavioral: The patient is nervous/anxious.       Physical Exam  Laboratory/Imaging   Hemodynamics  Temp (24hrs), Av.9 °C (98.4 °F), Min:36.7 °C (98 °F), Max:37.3 °C (99.1 °F)   Temperature: 36.7 °C (98 °F)  Pulse  Av.8  Min: 64  Max: 94    Blood Pressure: 147/94, NIBP: 128/72      Respiratory      Respiration: 16, Pulse Oximetry: 90 %     Work Of Breathing / Effort: Moderate  RLL Breath Sounds: Diminished, LLL Breath Sounds: Diminished    Fluids  No intake or output data in the 24 hours ending 18 1821    Nutrition  Orders Placed This Encounter   Procedures   • DIET ORDER     Standing Status:   Standing     Number of Occurrences:   1     Order Specific Question:   Diet:     Answer:   Regular [1]     Order Specific Question:   Electrolyte modifications:     Answer:   Low Potassium [3]     Physical Exam   Constitutional: She is oriented to person, place, and time. She appears well-developed and well-nourished. No distress.   Obese, non toxic   HENT: "   Head: Normocephalic and atraumatic.   Mouth/Throat: Oropharynx is clear and moist.   Eyes: EOM are normal. Pupils are equal, round, and reactive to light. Right eye exhibits no discharge. Left eye exhibits no discharge.   Conjunctival injection   Neck: Neck supple.   Cardiovascular: Normal rate and regular rhythm.    Pulmonary/Chest: Effort normal and breath sounds normal.   Abdominal: Soft. Bowel sounds are normal. She exhibits no distension. There is no tenderness.   Musculoskeletal: Normal range of motion. She exhibits no edema or tenderness.   Some brusing @ graft and decreased thrill   Neurological: She is alert and oriented to person, place, and time. No cranial nerve deficit.   Skin: Skin is warm and dry. She is not diaphoretic.   Psychiatric: She has a normal mood and affect.   Nursing note and vitals reviewed.      Recent Labs      04/17/18 1737 04/18/18   0350   WBC  4.9  4.6*   RBC  3.60*  3.28*   HEMOGLOBIN  11.1*  10.2*   HEMATOCRIT  34.4*  31.6*   MCV  95.6  96.3   MCH  30.8  31.1   MCHC  32.3*  32.3*   RDW  41.6  42.2   PLATELETCT  132*  126*   MPV  10.3  10.4     Recent Labs      04/17/18   1737  04/18/18   0350   SODIUM  138  139   POTASSIUM  5.2  4.9   CHLORIDE  105  105   CO2  18*  20   GLUCOSE  101*  85   BUN  49*  54*   CREATININE  10.68*  10.55*   CALCIUM  8.3*  8.2*                      Assessment/Plan     * UTI (urinary tract infection)- (present on admission)   Assessment & Plan    Start antibiotic therapy, monitor culture results.          ESRD (end stage renal disease) on dialysis (CMS-HCC)- (present on admission)   Assessment & Plan    Missed HD session yesterday, ineffective retry today due to graft failure/stenosis.    Vascular to eval tomorrow  No urgent needs for HD          Lupus (systemic lupus erythematosus) (CMS-HCC)- (present on admission)   Assessment & Plan    With ESRD> on Plaquenil        Research study patient   Assessment & Plan    This patient has been recruited in  the study: Efficacy of prophylactic oral Vancomycin in preventing recurrent Clostridium Difficile infection in hospitalized patients requiring antibiotics. Approximately 300 subjects will be randomized to one of three treatment arms in a 1:1:1 ratio (Vancomycin 125 mg PO every 12 hours for duration of antibiotic therapy, Vancomycin 125 mg PO every 24 hours for duration of antibiotic therapy, or no oral Vancomycin). Follow-up for endpoints will be done at 12 weeks following completion of antibiotic therapy.  Please contact study group before discharging this patient. This patient may need Research [facility] at discharge. [facility] will provide the required antibiotics.   Contact Information:  [email or web address]          Obesity (BMI 30-39.9)- (present on admission)   Assessment & Plan    Body mass index is 32.72 kg/m².          Hypertension- (present on admission)   Assessment & Plan    Controlled with current medication regimen.  Monitor.         Anemia- (present on admission)   Assessment & Plan    Normocytic and likely of chronic kidney disease.         Thrombocytopenia (CMS-HCC)- (present on admission)   Assessment & Plan    Without current evidence of bleed.  Monitor.         Opioid dependence (CMS-[facility])- (present on admission)   Assessment & Plan    Accessed  Verified patient receives   Tabs of 20mg oxycodone/ month  Updated Admission meds and reconciled        Fibromyalgia- (present on admission)   Assessment & Plan    On lyrica and high dose oxycodone          Quality-Core Measures   Reviewed items::  Labs reviewed and Medications reviewed  Taylor catheter::  No Taylor  DVT prophylaxis pharmacological::  Heparin  Antibiotics:  Treating active infection/contamination beyond 24 hours perioperative coverage  Assessed for rehabilitation services:  Patient returned to  prior level of function, rehabilitation not indicated at this time

## 2018-04-19 NOTE — CARE PLAN
Problem: Safety  Goal: Will remain free from falls    Intervention: Assess risk factors for falls  Patient ambulates independently with steady gait/      Problem: Pain Management  Goal: Pain level will decrease to patient's comfort goal    Intervention: Follow pain managment plan developed in collaboration with patient and Interdisciplinary Team  Pain control improved per patient with PRN oxycodone
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
## 2018-04-19 NOTE — CARE PLAN
Problem: Safety  Goal: Will remain free from falls    Intervention: Assess risk factors for falls  Low fall risk.      Problem: Pain Management  Goal: Pain level will decrease to patient's comfort goal    Intervention: Follow pain managment plan developed in collaboration with patient and Interdisciplinary Team  Pain well controlled with oral oxycodone

## 2018-04-19 NOTE — PROGRESS NOTES
Right arm arteriovenous graft pressurized from severe proximal stenosis and on verge of thrombosis.  Difficulty getting on schedule.  She needs general anesthesia.  Potassium 5.1    There still is faint bruit and no thrill but extremelty pulsatile.  NIV reviewed.    Patient recognizes the urgency.  Dr. Jansen and I have been working on scheduling.  Possibly this evening the OR can accommodate us.

## 2018-04-19 NOTE — PROGRESS NOTES
Received report from day shift nurse. Pt is alert and oriented by four and currently resting in bed. Side rails up by two. Call light is within reach as well as personal belongings. Non skid socks are in place. Bed alarms refused. Pt is ambulatory. Instructed patient to call out for assistance.

## 2018-04-20 LAB
HBV SURFACE AB SERPL IA-ACNC: <3.1 MIU/ML (ref 0–10)
HBV SURFACE AG SER QL: NEGATIVE

## 2018-04-20 PROCEDURE — A9270 NON-COVERED ITEM OR SERVICE: HCPCS | Performed by: HOSPITALIST

## 2018-04-20 PROCEDURE — 700102 HCHG RX REV CODE 250 W/ 637 OVERRIDE(OP): Performed by: INTERNAL MEDICINE

## 2018-04-20 PROCEDURE — 86706 HEP B SURFACE ANTIBODY: CPT

## 2018-04-20 PROCEDURE — 770006 HCHG ROOM/CARE - MED/SURG/GYN SEMI*

## 2018-04-20 PROCEDURE — A9270 NON-COVERED ITEM OR SERVICE: HCPCS | Performed by: INTERNAL MEDICINE

## 2018-04-20 PROCEDURE — 700102 HCHG RX REV CODE 250 W/ 637 OVERRIDE(OP): Performed by: HOSPITALIST

## 2018-04-20 PROCEDURE — 87340 HEPATITIS B SURFACE AG IA: CPT

## 2018-04-20 PROCEDURE — 5A1D70Z PERFORMANCE OF URINARY FILTRATION, INTERMITTENT, LESS THAN 6 HOURS PER DAY: ICD-10-PCS | Performed by: INTERNAL MEDICINE

## 2018-04-20 PROCEDURE — 700111 HCHG RX REV CODE 636 W/ 250 OVERRIDE (IP): Performed by: HOSPITALIST

## 2018-04-20 PROCEDURE — 90935 HEMODIALYSIS ONE EVALUATION: CPT

## 2018-04-20 PROCEDURE — 99232 SBSQ HOSP IP/OBS MODERATE 35: CPT | Performed by: INTERNAL MEDICINE

## 2018-04-20 RX ADMIN — PROMETHAZINE HYDROCHLORIDE 25 MG: 25 TABLET ORAL at 05:27

## 2018-04-20 RX ADMIN — OXYCODONE HYDROCHLORIDE 20 MG: 10 TABLET ORAL at 05:27

## 2018-04-20 RX ADMIN — SEVELAMER CARBONATE 2400 MG: 800 TABLET, FILM COATED ORAL at 12:09

## 2018-04-20 RX ADMIN — Medication 125 MG: at 11:30

## 2018-04-20 RX ADMIN — TRAZODONE HYDROCHLORIDE 50 MG: 50 TABLET ORAL at 21:50

## 2018-04-20 RX ADMIN — STANDARDIZED SENNA CONCENTRATE AND DOCUSATE SODIUM 2 TABLET: 8.6; 5 TABLET, FILM COATED ORAL at 21:50

## 2018-04-20 RX ADMIN — PREGABALIN 100 MG: 100 CAPSULE ORAL at 21:50

## 2018-04-20 RX ADMIN — CINACALCET HYDROCHLORIDE 30 MG: 30 TABLET, COATED ORAL at 21:50

## 2018-04-20 RX ADMIN — SEVELAMER CARBONATE 2400 MG: 800 TABLET, FILM COATED ORAL at 16:45

## 2018-04-20 RX ADMIN — OXYCODONE HYDROCHLORIDE 20 MG: 10 TABLET ORAL at 11:27

## 2018-04-20 RX ADMIN — HEPARIN SODIUM 5000 UNITS: 5000 INJECTION, SOLUTION INTRAVENOUS; SUBCUTANEOUS at 05:27

## 2018-04-20 RX ADMIN — OXYCODONE HYDROCHLORIDE 20 MG: 10 TABLET ORAL at 01:09

## 2018-04-20 RX ADMIN — BUPROPION HYDROCHLORIDE 150 MG: 150 TABLET, EXTENDED RELEASE ORAL at 11:27

## 2018-04-20 RX ADMIN — OXYCODONE HYDROCHLORIDE 20 MG: 10 TABLET ORAL at 16:45

## 2018-04-20 RX ADMIN — HEPARIN SODIUM 5000 UNITS: 5000 INJECTION, SOLUTION INTRAVENOUS; SUBCUTANEOUS at 14:48

## 2018-04-20 RX ADMIN — PREGABALIN 100 MG: 100 CAPSULE ORAL at 11:27

## 2018-04-20 RX ADMIN — HYDROXYCHLOROQUINE SULFATE 200 MG: 200 TABLET, FILM COATED ORAL at 21:50

## 2018-04-20 RX ADMIN — HEPARIN SODIUM 5000 UNITS: 5000 INJECTION, SOLUTION INTRAVENOUS; SUBCUTANEOUS at 21:49

## 2018-04-20 RX ADMIN — STANDARDIZED SENNA CONCENTRATE AND DOCUSATE SODIUM 2 TABLET: 8.6; 5 TABLET, FILM COATED ORAL at 06:00

## 2018-04-20 RX ADMIN — TIZANIDINE 8 MG: 4 TABLET ORAL at 21:50

## 2018-04-20 ASSESSMENT — ENCOUNTER SYMPTOMS
COUGH: 0
VOMITING: 0
HEADACHES: 0
NAUSEA: 0
BACK PAIN: 1
SHORTNESS OF BREATH: 0
DIZZINESS: 0
CONSTIPATION: 0
FEVER: 0
DIARRHEA: 0
CHILLS: 0
ABDOMINAL PAIN: 0

## 2018-04-20 ASSESSMENT — PAIN SCALES - GENERAL
PAINLEVEL_OUTOF10: 3
PAINLEVEL_OUTOF10: 4
PAINLEVEL_OUTOF10: 4
PAINLEVEL_OUTOF10: 7
PAINLEVEL_OUTOF10: 6

## 2018-04-20 NOTE — PROGRESS NOTES
"[facility] Hospitalist Progress Note    Date of Service: 2018    Chief Complaint  37 y.o. female with ESRD 2/2 SLE having issues w/ her HD graft presents after missing 2 sessions 2/2 \"high pressures\" in graft and w/ UTI.      Interval Problem Update  To OR to repair graft tonight  Drug seeking behaviors  Urine culture negative  Discussed w/ RN    Consultants/Specialty  Nephrology  Vascular surgery    Disposition  Anticipate home        Review of Systems   Constitutional: Positive for malaise/fatigue. Negative for chills and fever.   Respiratory: Positive for shortness of breath. Negative for cough.    Cardiovascular: Negative for chest pain.   Gastrointestinal: Negative for abdominal pain, constipation, diarrhea, nausea and vomiting.   Genitourinary: Negative for dysuria, frequency and urgency.   Musculoskeletal: Positive for back pain (sattes could not sleep was so severe).        Some pain and brusing from graft   Neurological: Negative for dizziness and headaches.   Psychiatric/Behavioral: The patient is nervous/anxious and has insomnia.       Physical Exam  Laboratory/Imaging   Hemodynamics  Temp (24hrs), Av.7 °C (98.1 °F), Min:36.2 °C (97.1 °F), Max:37.5 °C (99.5 °F)   Temperature: 37.5 °C (99.5 °F)  Pulse  Av.4  Min: 64  Max: 94    Blood Pressure: 120/72      Respiratory      Respiration: 16, Pulse Oximetry: 97 %        RLL Breath Sounds: Diminished, LLL Breath Sounds: Diminished    Fluids  No intake or output data in the 24 hours ending 18 1820    Nutrition  Orders Placed This Encounter   Procedures   • DIET NPO     Standing Status:   Standing     Number of Occurrences:   1     Order Specific Question:   Restrict to:     Answer:   Sips with Medications [3]     Physical Exam   Constitutional: She is oriented to person, place, and time. She appears well-developed and well-nourished. No distress.   drowsy   HENT:   Head: Normocephalic and atraumatic.   Mouth/Throat: Oropharynx is clear and moist. "   Eyes: EOM are normal. Pupils are equal, round, and reactive to light. Right eye exhibits no discharge. Left eye exhibits no discharge.   Neck: Neck supple.   Cardiovascular: Normal rate and regular rhythm.    Pulmonary/Chest: Effort normal and breath sounds normal.   Abdominal: Soft. Bowel sounds are normal. She exhibits no distension. There is no tenderness.   Musculoskeletal: Normal range of motion. She exhibits no edema or tenderness.   flinches when I touch her skin on her back   Neurological: She is alert and oriented to person, place, and time. No cranial nerve deficit.   Skin: Skin is warm and dry. She is not diaphoretic.   Psychiatric:   anxious   Nursing note and vitals reviewed.      Recent Labs      04/17/18 1737 04/18/18   0350  04/19/18   0415   WBC  4.9  4.6*  3.6*   RBC  3.60*  3.28*  3.38*   HEMOGLOBIN  11.1*  10.2*  10.2*   HEMATOCRIT  34.4*  31.6*  32.9*   MCV  95.6  96.3  97.3   MCH  30.8  31.1  30.2   MCHC  32.3*  32.3*  31.0*   RDW  41.6  42.2  43.2   PLATELETCT  132*  126*  118*   MPV  10.3  10.4  10.8     Recent Labs      04/17/18 1737 04/18/18   0350  04/19/18   0415   SODIUM  138  139  138   POTASSIUM  5.2  4.9  5.1   CHLORIDE  105  105  104   CO2  18*  20  22   GLUCOSE  101*  85  82   BUN  49*  54*  62*   CREATININE  10.68*  10.55*  12.23*   CALCIUM  8.3*  8.2*  8.2*     Recent Labs      04/19/18   1640   INR  1.03                  Assessment/Plan     * UTI (urinary tract infection)- (present on admission)   Assessment & Plan    Ruled out  Antibiotics have been discontinued        ESRD (end stage renal disease) on dialysis (CMS-HCC)- (present on admission)   Assessment & Plan    Missed HD session yesterday, ineffective retry today due to graft failure/stenosis.    Vascular to eval tomorrow  No urgent needs for HD          Lupus (systemic lupus erythematosus) (CMS-HCC)- (present on admission)   Assessment & Plan    With ESRD> on Plaquenil        Research study patient   Assessment &  Plan    This patient has been recruited in the study: Efficacy of prophylactic oral Vancomycin in preventing recurrent Clostridium Difficile infection in hospitalized patients requiring antibiotics. Approximately 300 subjects will be randomized to one of three treatment arms in a 1:1:1 ratio (Vancomycin 125 mg PO every 12 hours for duration of antibiotic therapy, Vancomycin 125 mg PO every 24 hours for duration of antibiotic therapy, or no oral Vancomycin). Follow-up for endpoints will be done at 12 weeks following completion of antibiotic therapy.  Please contact study group before discharging this patient. This patient may need Research Antib[facility] at discharge. [facility] will provide the required antibiotics.   Contact Information:  [email or web address]          Obesity (BMI 30-39.9)- (present on admission)   Assessment & Plan    Body mass index is 32.72 kg/m².          Hypertension- (present on admission)   Assessment & Plan    Controlled with current medication regimen.  Monitor.         Anemia- (present on admission)   Assessment & Plan    Normocytic and likely of chronic kidney disease.         Thrombocytopenia (CMS-[facility])- (present on admission)   Assessment & Plan    Without current evidence of bleed.  Monitor.         Opioid dependence (CMS-[facility])- (present on admission)   Assessment & Plan    Accessed  Verified patient receives   Tabs of 20mg oxycodone/ month  Updated Admission meds and reconciled  Patient was given some leeway with ability to take dosing at half the interval of her home dose  She is still trying to get or narcotics but there is no acute indication-UTI and pyelonephritis has effectively been ruled out at this point        Fibromyalgia- (present on admission)   Assessment & Plan    On lyrica and high dose oxycodone          Quality-Core Measures    Reviewed items::  Labs reviewed and Medications reviewed  Taylor catheter::  No Taylor  DVT prophylaxis pharmacological::  Heparin  Antibiotics:  Treating active infection/contamination beyond 24 hours perioperative coverage  Assessed for rehabilitation services:  Patient returned to prior level of function, rehabilitation not indicated at this time

## 2018-04-20 NOTE — PROGRESS NOTES
Hospital Medicine Progress Note, Adult, Complex               Author: Fadi Najjar Date & Time created: 4/20/2018  1:59 PM     Interval History:  Pt with ESRD, SLE, presented with AVG dysfunction  Pt is doing better  Seen and examined while getting HD.    Review of Systems:  Review of Systems   Respiratory: Negative for cough and shortness of breath.    Cardiovascular: Positive for leg swelling. Negative for chest pain.   Gastrointestinal: Negative for nausea and vomiting.       Physical Exam:  Physical Exam   Constitutional: She is oriented to person, place, and time. No distress.   HENT:   Right Ear: External ear normal.   Left Ear: External ear normal.   Nose: Nose normal.   Eyes: Conjunctivae are normal. Right eye exhibits no discharge. Left eye exhibits no discharge. No scleral icterus.   Cardiovascular: Normal rate and regular rhythm.    No murmur heard.  Pulmonary/Chest: Effort normal and breath sounds normal. No respiratory distress. She has no wheezes.   Abdominal: Soft. Bowel sounds are normal. She exhibits no distension. There is no tenderness.   Musculoskeletal: She exhibits edema.   Neurological: She is alert and oriented to person, place, and time.   Skin: Skin is warm. She is not diaphoretic.   Psychiatric: She has a normal mood and affect. Her behavior is normal.   Nursing note and vitals reviewed.      Labs:        Invalid input(s): [id number]      Recent Labs      04/17/18 1737 04/18/18 0350 04/19/18 0415   SODIUM  138  139  138   POTASSIUM  5.2  4.9  5.1   CHLORIDE  105  105  104   CO2  18*  20  22   BUN  49*  54*  62*   CREATININE  10.68*  10.55*  12.23*   CALCIUM  8.3*  8.2*  8.2*     Recent Labs      04/17/18 1737 04/18/18   0350  04/19/18 0415   ALTSGPT  27   --    --    ASTSGOT  13   --    --    ALKPHOSPHAT  89   --    --    TBILIRUBIN  0.5   --    --    LIPASE  14   --    --    GLUCOSE  101*  85  82     Recent Labs      04/17/18 1737 04/18/18 0350  04/19/18 0415   18   1640   RBC  3.60*  3.28*  3.38*   --    HEMOGLOBIN  11.1*  10.2*  10.2*   --    HEMATOCRIT  34.4*  31.6*  32.9*   --    PLATELETCT  132*  126*  118*   --    PROTHROMBTM   --    --    --   13.2   INR   --    --    --   1.03     Recent Labs      18   1737  18   0350  18   0415   WBC  4.9  4.6*  3.6*   NEUTSPOLYS  60.40  49.60  47.20   LYMPHOCYTES  27.70  36.80  36.00   MONOCYTES  8.30  10.70  12.90   EOSINOPHILS  2.20  2.00  2.20   BASOPHILS  1.00  0.70  1.40   ASTSGOT  13   --    --    ALTSGPT  27   --    --    ALKPHOSPHAT  89   --    --    TBILIRUBIN  0.5   --    --            Hemodynamics:  Temp (24hrs), Av.8 °C (98.2 °F), Min:36.1 °C (97 °F), Max:37.5 °C (99.5 °F)  Temperature: 37.3 °C (99.2 °F)  Pulse  Av.3  Min: 60  Max: 94Heart Rate (Monitored): 67  Blood Pressure: 132/82, NIBP: 125/70     Respiratory:    Respiration: 16, Pulse Oximetry: 91 %        RUL Breath Sounds: Clear, RML Breath Sounds: Clear, RLL Breath Sounds: Diminished, LASHANDA Breath Sounds: Clear, LLL Breath Sounds: Diminished  Fluids:    Intake/Output Summary (Last 24 hours) at 18 1359  Last data filed at 18 1100   Gross per 24 hour   Intake             1500 ml   Output             2300 ml   Net             -800 ml        GI/Nutrition:  Orders Placed This Encounter   Procedures   • Diet Order     Standing Status:   Standing     Number of Occurrences:   1     Order Specific Question:   Diet:     Answer:   Diabetic [3]     Order Specific Question:   Diet:     Answer:   Renal [8]     Medical Decision Making, by Problem:  Active Hospital Problems    Diagnosis   • *UTI (urinary tract infection) [N39.0]   • ESRD (end stage renal disease) on dialysis (CMS-HCC) [N18.6, Z99.2]   • Lupus (systemic lupus erythematosus) (CMS-HCC) [M32.9]   • Anemia [D64.9]   • Thrombocytopenia (CMS-HCC) [D69.6]   • Opioid dependence (CMS-HCC) [F11.20]   • Fibromyalgia [M79.7]   • Research study patient [Z00.6]   • Obesity (BMI  30-39.9) [E66.9]   • Hypertension [I10]       Quality-Core Measures    1 ESRD  2 AVG dysfunction: appreciate Dr Ardon help  3 Anemia  4 chronic pain  Plan  Renal dose all meds  Avoid nephrotoxins  Prognosis guarded

## 2018-04-20 NOTE — PROGRESS NOTES
Patient arrived to room 313-1 from PACU via [place]. Patient ambulated from [facility] to bed with steady gait. She is A&Ox4. ON 3L via nasal cannula. Vitals are stable. Site to right upper arm (fistua) has gauze and Tegaderm which is CDI. Patient overall is comfortable and hs no complaints aside from mild pain. RN to medicate accordingly. Call device in reach.WIll continue to monitor.

## 2018-04-20 NOTE — PROGRESS NOTES
Prophylactic Vancomycin to prevent C.Diff study    Patient's primary antibiotics for probable UTI were discontinued by primary team. Hence, study drug vancomycin being discontinued by study team.

## 2018-04-20 NOTE — PROGRESS NOTES
IR Procedure Note:    Site Marked and Confirmed with MD, patient and RN pre procedure. Dr. Ardon performed an angioplasty of the right AV fistula.  The pt tolerated the procedure well; Dr. Arrieta provided anesthesia.  Sutures, gauze and tegaderm applied to right upper arm, CDI and soft; pressure held x 5 minutes.  Pt alert and verbally appropriate post procedure, vital signs stable during procedure and transport, see flow sheet for vital signs.  Report given to Jose FINCH.  RN transported pt to [facility] with Sao2 monitor.

## 2018-04-20 NOTE — CARE PLAN
Problem: Pain Management  Goal: Pain level will decrease to patient's comfort goal  Outcome: PROGRESSING AS EXPECTED  PRN pain medications administration for pain control. Ice packs in use as well.     Problem: Fluid Volume:  Goal: Will maintain balanced intake and output  Outcome: PROGRESSING AS EXPECTED  Documenting I&O. HD planned for AM.

## 2018-04-20 NOTE — CARE PLAN
Problem: Safety  Goal: Will remain free from falls    Intervention: Assess risk factors for falls  Patient ambulates independently with steady gait.      Problem: Pain Management  Goal: Pain level will decrease to patient's comfort goal    Intervention: Follow pain managment plan developed in collaboration with patient and Interdisciplinary Team  Pain well controlled with oral medications

## 2018-04-20 NOTE — OR NURSING
Pt initially stating she was fine with no complaints. After transport requested pt stated that she had pain and requested medication. Pt offered Oxycodone. Pt states that she generally gets 20mg and would be due at 2100. Asked if I could provide that dose. Pt advised that 20mg would not be available until 2100 when she is in her room. Pt stated she would wait for 2100 dose.

## 2018-04-20 NOTE — CARE PLAN
Problem: Nutritional:  Goal: Achieve adequate nutritional intake  Patient will consume 50% of meals   Outcome: PROGRESSING AS EXPECTED    Intervention: Monitor PO intake, weights, and laboratory values  Only one meal documented at this time as pt has been NPO for large portion of admit.   1 meal documented however was 50-75% consumed.   Will continue to monitor for consistent adequate intake on Diabetic/Renal diet.     RD following

## 2018-04-20 NOTE — PROGRESS NOTES
HEMODIALYSIS NOTES:     HD today x 3 hours per Dr. Najjar. Initiated at 0744 and ended at 1100. Patient tolerated treatment. Please see paper flowsheet for details.     Patient had multiple venous alarms. Venous pressure between 250-300 on a 17g needle and on -250 only. Tried to recannulate another venous site but still no change on the pressure. No significant problem upon aspiration by syringe but once hooked to machine, venous alarms noted. Dr. Najjar aware.      UF Net: 1,700 mL    Blood returned. Applied gauze and held L AVF site for 10 minutes. Verified no bleeding. Bruit and thrill present post dialysis. Instructions given to Primary RN that if bleeding occurs on the AVF site, change dressing and held the site with pressure.     Report given to XIOMARA Montilla RN.

## 2018-04-20 NOTE — OP REPORT
DATE OF SERVICE:  04/19/2018    PREOPERATIVE DIAGNOSIS:  Stage V kidney disease with failing right arm   arteriovenous graft secondary to outflow stenosis.    POSTOPERATIVE DIAGNOSIS:  Stage V kidney disease with failing right arm   arteriovenous graft secondary to outflow stenosis.    OPERATIONS:  Right arm AV graft fistulogram.  Balloon angioplasty, 7mm   Conquest, venous outflow anastomosis.    SURGEON:  Shabbir Ardon MD    ANESTHESIA:  General anesthesia.    ANESTHESIOLOGIST:  Mary Arrieta MD    DESCRIPTION OF PROCEDURE:  After obtaining informed consent, the patient was   placed on the angiography table and put under general anesthesia.  A time-out   was performed.  ChloraPrep was used on the right upper extremity and the   patient was draped sterilely.  With ultrasound and with local anesthesia of 1%   Xylocaine, I placed a micro dilator in the distal one-third of the arm and   directed toward the axilla.  Through the micro dilator, contrast was injected   and a 2 cm long pinhole stenosis was evident at the outflow.  The axillary   vein proximal to this and into the central veins was widely patent.  I placed   a 6-Turkmen sheath and dilated the stenosis with a 7-Turkmen Conquest.  I was   fairly aggressive because he is tender, rebound.  There was a small leak that   was created at the anastomosis, but it was definitely improved in terms of   diameter of the outflow.  The extravasation did not persist more than through   1 or 2 venograms.  The flow was brisk.  I inflated the balloon in the fistula   and injecting through the sheath and this refluxed contrast through the   arterial end of the graft into the brachial artery.  There are some   irregularities and it is smaller in diameter, but I do not believe it is flow   restricting.  The main issue with the outflow stenosis.  We did a formal   central venogram as I have stated, and that was satisfactory.  I reinflated   the balloon to partial nominal  atmospheres of 5-6 and held it up for 20-30   seconds.  I then did a final fistulogram and was satisfied that we had   resolution of high-grade stenosis, although there is a generalized narrowing.    The only way to resolve this would be with stenting, which I do not believe,   should be done at this time.  The patient tolerated the procedure well.  I put   a 5-0 Prolene in the exit site to control bleeding from the angio entry   point.  Blood loss was less than 3 mL.  A dressing was applied.  She was taken   back to the shen and the fistula is ready for use.       ____________________________________     MD RUBEN Jackson / [facility]    DD:  04/19/2018 19:09:12  DT:  04/19/2018 20:26:37    D#:  [id number]  Job#:  [id number]

## 2018-04-21 PROCEDURE — 99232 SBSQ HOSP IP/OBS MODERATE 35: CPT | Performed by: INTERNAL MEDICINE

## 2018-04-21 PROCEDURE — 700102 HCHG RX REV CODE 250 W/ 637 OVERRIDE(OP): Performed by: INTERNAL MEDICINE

## 2018-04-21 PROCEDURE — 770006 HCHG ROOM/CARE - MED/SURG/GYN SEMI*

## 2018-04-21 PROCEDURE — 90935 HEMODIALYSIS ONE EVALUATION: CPT

## 2018-04-21 PROCEDURE — 700102 HCHG RX REV CODE 250 W/ 637 OVERRIDE(OP): Performed by: HOSPITALIST

## 2018-04-21 PROCEDURE — A9270 NON-COVERED ITEM OR SERVICE: HCPCS | Performed by: HOSPITALIST

## 2018-04-21 PROCEDURE — 700111 HCHG RX REV CODE 636 W/ 250 OVERRIDE (IP): Performed by: HOSPITALIST

## 2018-04-21 PROCEDURE — A9270 NON-COVERED ITEM OR SERVICE: HCPCS | Performed by: INTERNAL MEDICINE

## 2018-04-21 RX ADMIN — CINACALCET HYDROCHLORIDE 30 MG: 30 TABLET, COATED ORAL at 20:30

## 2018-04-21 RX ADMIN — HEPARIN SODIUM 5000 UNITS: 5000 INJECTION, SOLUTION INTRAVENOUS; SUBCUTANEOUS at 16:22

## 2018-04-21 RX ADMIN — OXYCODONE HYDROCHLORIDE 20 MG: 10 TABLET ORAL at 07:07

## 2018-04-21 RX ADMIN — TIZANIDINE 8 MG: 4 TABLET ORAL at 20:30

## 2018-04-21 RX ADMIN — PREGABALIN 100 MG: 100 CAPSULE ORAL at 20:30

## 2018-04-21 RX ADMIN — BUPROPION HYDROCHLORIDE 150 MG: 150 TABLET, EXTENDED RELEASE ORAL at 10:54

## 2018-04-21 RX ADMIN — PROMETHAZINE HYDROCHLORIDE 25 MG: 25 TABLET ORAL at 03:10

## 2018-04-21 RX ADMIN — OXYCODONE HYDROCHLORIDE 20 MG: 10 TABLET ORAL at 16:22

## 2018-04-21 RX ADMIN — PREGABALIN 100 MG: 100 CAPSULE ORAL at 10:59

## 2018-04-21 RX ADMIN — HEPARIN SODIUM 5000 UNITS: 5000 INJECTION, SOLUTION INTRAVENOUS; SUBCUTANEOUS at 20:30

## 2018-04-21 RX ADMIN — OXYCODONE HYDROCHLORIDE 20 MG: 10 TABLET ORAL at 10:59

## 2018-04-21 RX ADMIN — ONDANSETRON 4 MG: 2 INJECTION, SOLUTION INTRAMUSCULAR; INTRAVENOUS at 18:13

## 2018-04-21 RX ADMIN — OXYCODONE HYDROCHLORIDE 20 MG: 10 TABLET ORAL at 20:34

## 2018-04-21 RX ADMIN — OXYCODONE HYDROCHLORIDE 20 MG: 10 TABLET ORAL at 03:10

## 2018-04-21 RX ADMIN — HEPARIN SODIUM 5000 UNITS: 5000 INJECTION, SOLUTION INTRAVENOUS; SUBCUTANEOUS at 05:10

## 2018-04-21 RX ADMIN — TRAZODONE HYDROCHLORIDE 50 MG: 50 TABLET ORAL at 20:30

## 2018-04-21 RX ADMIN — HYDROXYCHLOROQUINE SULFATE 200 MG: 200 TABLET, FILM COATED ORAL at 20:30

## 2018-04-21 RX ADMIN — SEVELAMER CARBONATE 2400 MG: 800 TABLET, FILM COATED ORAL at 18:13

## 2018-04-21 RX ADMIN — SEVELAMER CARBONATE 2400 MG: 800 TABLET, FILM COATED ORAL at 10:54

## 2018-04-21 RX ADMIN — STANDARDIZED SENNA CONCENTRATE AND DOCUSATE SODIUM 2 TABLET: 8.6; 5 TABLET, FILM COATED ORAL at 10:54

## 2018-04-21 ASSESSMENT — ENCOUNTER SYMPTOMS
BACK PAIN: 1
VOMITING: 0
CONSTIPATION: 0
SHORTNESS OF BREATH: 0
ABDOMINAL PAIN: 0
DIZZINESS: 0
CHILLS: 0
COUGH: 0
NAUSEA: 0
DIARRHEA: 0
HEADACHES: 0
FEVER: 0

## 2018-04-21 ASSESSMENT — PAIN SCALES - GENERAL
PAINLEVEL_OUTOF10: 6
PAINLEVEL_OUTOF10: 4
PAINLEVEL_OUTOF10: 7
PAINLEVEL_OUTOF10: 4
PAINLEVEL_OUTOF10: 5
PAINLEVEL_OUTOF10: 7

## 2018-04-21 NOTE — PROGRESS NOTES
HEMODIALYSIS NOTES:     HD today x 3 hours per Dr. Najjar. Initiated at  1219 and ended at 1507.     Terminated HD 15 minutes prior to end due to clotted dialyzer. Patient still have access issues. Venous pressure still high on 250-300-315 mmHg in the machine on a -235. Dr. Najjar aware and notified.    UF Net: 2,212 mL    Blood returned. Applied gauze and held R AVG site for 10 minutes. Verified no bleeding. Bruit and thrill present post dialysis.SHELBI  Still with bruises, swelling and pain.  Instructions given to Primary RN that if bleeding occurs on the AVF site, change dressing and held the site with pressure.     Report given PETR Ye RN.

## 2018-04-21 NOTE — PROGRESS NOTES
"[facility] Hospitalist Progress Note    Date of Service: 2018    Chief Complaint  37 y.o. female with ESRD 2/2 SLE having issues w/ her HD graft presents after missing 2 sessions 2/2 \"high pressures\" in graft and w/ UTI.      Interval Problem Update  tolerated HD today- vague and ambivalent about this however  Advised antibiotics were discontinued as she has no UTI or pyelonephritis  Anticipates she will be dialyzed again tomorrow and then we will discharge to home-updated her on plan of care.  As antibiotics were discontinued she has been discontinued from the vancomycin study protocol  Discussed with RN  Consultants/Specialty  Nephrology  Vascular surgery    Disposition  Anticipate home        Review of Systems   Constitutional: Negative for chills and fever.   Respiratory: Negative for cough.    Cardiovascular: Negative for chest pain.   Gastrointestinal: Negative for abdominal pain, constipation, diarrhea, nausea and vomiting.   Genitourinary: Negative for dysuria, frequency and urgency.   Musculoskeletal: Positive for back pain (advised this is likely just her fibromyalgia).        Still complains of pain in graft area   Neurological: Negative for dizziness and headaches.      Physical Exam  Laboratory/Imaging   Hemodynamics  Temp (24hrs), Av.7 °C (98.1 °F), Min:36.1 °C (97 °F), Max:37.3 °C (99.2 °F)   Temperature: 36.6 °C (97.9 °F)  Pulse  Av.5  Min: 60  Max: 94 Heart Rate (Monitored): 67  Blood Pressure: 131/76, NIBP: 125/70      Respiratory      Respiration: 16, Pulse Oximetry: 94 %        RUL Breath Sounds: Clear, RML Breath Sounds: Clear, RLL Breath Sounds: Diminished, LASHANDA Breath Sounds: Clear, LLL Breath Sounds: Diminished    Fluids    Intake/Output Summary (Last 24 hours) at 18 1703  Last data filed at 18 1100   Gross per 24 hour   Intake             1500 ml   Output             2300 ml   Net             -800 ml       Nutrition  Orders Placed This Encounter   Procedures   • Diet " Order     Standing Status:   Standing     Number of Occurrences:   1     Order Specific Question:   Diet:     Answer:   Regular [1]     Order Specific Question:   Electrolyte modifications:     Answer:   Low Potassium [3]     Physical Exam   Constitutional: She is oriented to person, place, and time. She appears well-developed and well-nourished. No distress.   HENT:   Head: Normocephalic and atraumatic.   Mouth/Throat: Oropharynx is clear and moist.   Eyes: EOM are normal. Pupils are equal, round, and reactive to light. Right eye exhibits no discharge. Left eye exhibits no discharge.   Neck: Neck supple.   Cardiovascular: Normal rate and regular rhythm.    Pulmonary/Chest: Effort normal and breath sounds normal.   Abdominal: Soft. Bowel sounds are normal. She exhibits no distension. There is no tenderness.   Musculoskeletal: Normal range of motion. She exhibits no edema or tenderness.   Fistula graft area has stings over it after dialysis   Neurological: She is alert and oriented to person, place, and time. No cranial nerve deficit.   Skin: Skin is warm and dry. She is not diaphoretic.   Psychiatric:   Flat   Nursing note and vitals reviewed.      Recent Labs      04/17/18 1737 04/18/18   0350  04/19/18   0415   WBC  4.9  4.6*  3.6*   RBC  3.60*  3.28*  3.38*   HEMOGLOBIN  11.1*  10.2*  10.2*   HEMATOCRIT  34.4*  31.6*  32.9*   MCV  95.6  96.3  97.3   MCH  30.8  31.1  30.2   MCHC  32.3*  32.3*  31.0*   RDW  41.6  42.2  43.2   PLATELETCT  132*  126*  118*   MPV  10.3  10.4  10.8     Recent Labs      04/17/18   1737  04/18/18   0350  04/19/18   0415   SODIUM  138  139  138   POTASSIUM  5.2  4.9  5.1   CHLORIDE  105  105  104   CO2  18*  20  22   GLUCOSE  101*  85  82   BUN  49*  54*  62*   CREATININE  10.68*  10.55*  12.23*   CALCIUM  8.3*  8.2*  8.2*     Recent Labs      04/19/18   1640   INR  1.03                  Assessment/Plan     * UTI (urinary tract infection)- (present on admission)   Assessment & Plan     Ruled out  Antibiotics have been discontinued        ESRD (end stage renal disease) on dialysis (CMS-HCC)- (present on admission)   Assessment & Plan    Graft found to be stenotic  Dilated with improvement in flow-per surgical note may ultimately need stenting  Patient tolerated dialysis but apparently there was still some problem according to her  Rob for dialysis again tomorrow         Lupus (systemic lupus erythematosus) (CMS-HCC)- (present on admission)   Assessment & Plan    With ESRD> on Plaquenil        Research study patient   Assessment & Plan    This patient has been recruited in the study: Efficacy of prophylactic oral Vancomycin in preventing recurrent Clostridium Difficile infection in hospitalized patients requiring antibiotics. Approximately 300 subjects will be randomized to one of three treatment arms in a 1:1:1 ratio (Vancomycin 125 mg PO every 12 hours for duration of antibiotic therapy, Vancomycin 125 mg PO every 24 hours for duration of antibiotic therapy, or no oral Vancomycin). Follow-up for endpoints will be done at 12 weeks following completion of antibiotic therapy.  Please contact study group before discharging this patient. This patient may need [facility] at discharge. [facility] will provide the required antibiotics.   Contact Information:  [email or web address]          Obesity (BMI 30-39.9)- (present on admission)   Assessment & Plan    Body mass index is 32.72 kg/m².          Hypertension- (present on admission)   Assessment & Plan    Controlled with current medication regimen.  Monitor.         Anemia- (present on admission)   Assessment & Plan    Normocytic and likely of chronic kidney disease.         Thrombocytopenia (CMS-HCC)- (present on admission)   Assessment & Plan    Without current evidence of bleed.  Monitor.         Opioid  dependence (CMS-HCC)- (present on admission)   Assessment & Plan    Accessed  Verified patient receives   Tabs of 20mg oxycodone/ month  Updated Admission meds and reconciled  Patient was given some leeway with ability to take dosing at half the interval of her home dose  She is still trying to get or narcotics but there is no acute indication-UTI and pyelonephritis has effectively been ruled out at this point        Fibromyalgia- (present on admission)   Assessment & Plan    On lyrica and high dose oxycodone          Quality-Core Measures   Reviewed items::  Labs reviewed and Medications reviewed  Taylor catheter::  No Taylor  DVT prophylaxis pharmacological::  Heparin  Antibiotics:  Treating active infection/contamination beyond 24 hours perioperative coverage  Assessed for rehabilitation services:  Patient returned to prior level of function, rehabilitation not indicated at this time

## 2018-04-21 NOTE — PROGRESS NOTES
Hospital Medicine Progress Note, Adult, Complex               Author: Fadi Najjar Date & Time created: 4/21/2018  1:47 PM     Interval History:  Pt with ESRD, SLE, presented with AVG dysfunction  Pt is doing better  Seen and examined while getting HD.    Review of Systems:  Review of Systems   Respiratory: Negative for cough and shortness of breath.    Cardiovascular: Positive for leg swelling. Negative for chest pain.   Gastrointestinal: Negative for nausea and vomiting.       Physical Exam:  Physical Exam   Constitutional: She is oriented to person, place, and time. No distress.   HENT:   Right Ear: External ear normal.   Left Ear: External ear normal.   Nose: Nose normal.   Eyes: Conjunctivae are normal. Right eye exhibits no discharge. Left eye exhibits no discharge. No scleral icterus.   Cardiovascular: Normal rate and regular rhythm.    No murmur heard.  Pulmonary/Chest: Effort normal and breath sounds normal. No respiratory distress. She has no wheezes.   Abdominal: Soft. Bowel sounds are normal. She exhibits no distension. There is no tenderness.   Musculoskeletal: She exhibits edema.   Neurological: She is alert and oriented to person, place, and time.   Skin: Skin is warm. She is not diaphoretic.   Psychiatric: She has a normal mood and affect. Her behavior is normal.   Nursing note and vitals reviewed.      Labs:        Invalid input(s): [id number]      Recent Labs      04/19/18   0415   SODIUM  138   POTASSIUM  5.1   CHLORIDE  104   CO2  22   BUN  62*   CREATININE  12.23*   CALCIUM  8.2*     Recent Labs      04/19/18   0415   GLUCOSE  82     Recent Labs      04/19/18   0415  04/19/18   1640   RBC  3.38*   --    HEMOGLOBIN  10.2*   --    HEMATOCRIT  32.9*   --    PLATELETCT  118*   --    PROTHROMBTM   --   13.2   INR   --   1.03     Recent Labs      04/19/18   0415   WBC  3.6*   NEUTSPOLYS  47.20   LYMPHOCYTES  36.00   MONOCYTES  12.90   EOSINOPHILS  2.20   BASOPHILS  1.40            Hemodynamics:  Temp (24hrs), Av.5 °C (97.7 °F), Min:36.2 °C (97.2 °F), Max:36.8 °C (98.3 °F)  Temperature: 36.2 °C (97.2 °F)  Pulse  Av.4  Min: 60  Max: 94   Blood Pressure: 133/84     Respiratory:    Respiration: 16, Pulse Oximetry: 98 %        RUL Breath Sounds: Clear, RML Breath Sounds: Clear, RLL Breath Sounds: Diminished, LASHANDA Breath Sounds: Clear, LLL Breath Sounds: Diminished  Fluids:    Intake/Output Summary (Last 24 hours) at 18 1347  Last data filed at 18 2200   Gross per 24 hour   Intake              200 ml   Output                0 ml   Net              200 ml        GI/Nutrition:  Orders Placed This Encounter   Procedures   • Diet Order     Standing Status:   Standing     Number of Occurrences:   1     Order Specific Question:   Diet:     Answer:   Regular [1]     Order Specific Question:   Electrolyte modifications:     Answer:   Low Potassium [3]     Medical Decision Making, by Problem:  Active Hospital Problems    Diagnosis   • *UTI (urinary tract infection) [N39.0]   • ESRD (end stage renal disease) on dialysis (CMS-HCC) [N18.6, Z99.2]   • Lupus (systemic lupus erythematosus) (CMS-HCC) [M32.9]   • Anemia [D64.9]   • Thrombocytopenia (CMS-HCC) [D69.6]   • Opioid dependence (CMS-HCC) [F11.20]   • Fibromyalgia [M79.7]   • Research study patient [Z00.6]   • Obesity (BMI 30-39.9) [E66.9]   • Hypertension [I10]       Quality-Core Measures    1 ESRD  2 AVG dysfunction: appreciate Dr Ardon help  3 Anemia  4 chronic pain  Plan  Renal dose all meds  Avoid nephrotoxins  Prognosis guarded  Can be D/c from renal point

## 2018-04-21 NOTE — CARE PLAN
Problem: Knowledge Deficit  Goal: Knowledge of disease process/condition, treatment plan, diagnostic tests, and medications will improve  Outcome: PROGRESSING AS EXPECTED  Discussed plan of care with patient. Questions answered within RN scope. HD planned for 4/21 AM.     Problem: Pain Management  Goal: Pain level will decrease to patient's comfort goal  Outcome: PROGRESSING AS EXPECTED  Pain meds, repositioning and ice packs in use for pain control.

## 2018-04-22 VITALS
HEIGHT: 65 IN | DIASTOLIC BLOOD PRESSURE: 71 MMHG | OXYGEN SATURATION: 93 % | TEMPERATURE: 98.6 F | RESPIRATION RATE: 16 BRPM | BODY MASS INDEX: 32.76 KG/M2 | WEIGHT: 196.65 LBS | HEART RATE: 70 BPM | SYSTOLIC BLOOD PRESSURE: 111 MMHG

## 2018-04-22 PROCEDURE — 700111 HCHG RX REV CODE 636 W/ 250 OVERRIDE (IP): Performed by: HOSPITALIST

## 2018-04-22 PROCEDURE — 700102 HCHG RX REV CODE 250 W/ 637 OVERRIDE(OP): Performed by: HOSPITALIST

## 2018-04-22 PROCEDURE — A9270 NON-COVERED ITEM OR SERVICE: HCPCS | Performed by: INTERNAL MEDICINE

## 2018-04-22 PROCEDURE — 700105 HCHG RX REV CODE 258

## 2018-04-22 PROCEDURE — 99239 HOSP IP/OBS DSCHRG MGMT >30: CPT | Performed by: INTERNAL MEDICINE

## 2018-04-22 PROCEDURE — 700111 HCHG RX REV CODE 636 W/ 250 OVERRIDE (IP): Performed by: INTERNAL MEDICINE

## 2018-04-22 PROCEDURE — 700102 HCHG RX REV CODE 250 W/ 637 OVERRIDE(OP): Performed by: INTERNAL MEDICINE

## 2018-04-22 PROCEDURE — A9270 NON-COVERED ITEM OR SERVICE: HCPCS | Performed by: HOSPITALIST

## 2018-04-22 RX ORDER — SODIUM CHLORIDE 9 MG/ML
INJECTION, SOLUTION INTRAVENOUS
Status: COMPLETED
Start: 2018-04-22 | End: 2018-04-22

## 2018-04-22 RX ADMIN — PROMETHAZINE HYDROCHLORIDE 25 MG: 25 TABLET ORAL at 08:29

## 2018-04-22 RX ADMIN — SODIUM CHLORIDE: 9 INJECTION, SOLUTION INTRAVENOUS at 11:15

## 2018-04-22 RX ADMIN — PREGABALIN 100 MG: 100 CAPSULE ORAL at 08:26

## 2018-04-22 RX ADMIN — BUPROPION HYDROCHLORIDE 150 MG: 150 TABLET, EXTENDED RELEASE ORAL at 08:26

## 2018-04-22 RX ADMIN — OXYCODONE HYDROCHLORIDE 20 MG: 10 TABLET ORAL at 09:35

## 2018-04-22 RX ADMIN — OXYCODONE HYDROCHLORIDE 20 MG: 10 TABLET ORAL at 01:04

## 2018-04-22 RX ADMIN — SEVELAMER CARBONATE 2400 MG: 800 TABLET, FILM COATED ORAL at 11:17

## 2018-04-22 RX ADMIN — HEPARIN SODIUM 5000 UNITS: 5000 INJECTION, SOLUTION INTRAVENOUS; SUBCUTANEOUS at 04:57

## 2018-04-22 RX ADMIN — OXYCODONE HYDROCHLORIDE 20 MG: 10 TABLET ORAL at 13:26

## 2018-04-22 RX ADMIN — SEVELAMER CARBONATE 2400 MG: 800 TABLET, FILM COATED ORAL at 08:26

## 2018-04-22 RX ADMIN — HEPARIN 500 UNITS: 100 SYRINGE at 13:26

## 2018-04-22 RX ADMIN — OXYCODONE HYDROCHLORIDE 20 MG: 10 TABLET ORAL at 05:00

## 2018-04-22 ASSESSMENT — ENCOUNTER SYMPTOMS
SHORTNESS OF BREATH: 0
NAUSEA: 0
VOMITING: 0
COUGH: 0

## 2018-04-22 ASSESSMENT — PAIN SCALES - GENERAL
PAINLEVEL_OUTOF10: 8
PAINLEVEL_OUTOF10: 8
PAINLEVEL_OUTOF10: 7

## 2018-04-22 NOTE — PROGRESS NOTES
Hospital Medicine Progress Note, Adult, Complex               Author: Fadi Najjar Date & Time created: 2018  11:36 AM     Interval History:  Pt with ESRD, SLE, presented with AVG dysfunction  Pt is doing better    Review of Systems:  Review of Systems   Respiratory: Negative for cough and shortness of breath.    Cardiovascular: Negative for chest pain and leg swelling.   Gastrointestinal: Negative for nausea and vomiting.   Musculoskeletal: Positive for joint pain.       Physical Exam:  Physical Exam   Constitutional: She is oriented to person, place, and time. No distress.   HENT:   Right Ear: External ear normal.   Left Ear: External ear normal.   Nose: Nose normal.   Eyes: Conjunctivae are normal. Right eye exhibits no discharge. Left eye exhibits no discharge. No scleral icterus.   Cardiovascular: Normal rate and regular rhythm.    No murmur heard.  Pulmonary/Chest: Effort normal and breath sounds normal. No respiratory distress. She has no wheezes.   Abdominal: Soft. Bowel sounds are normal. She exhibits no distension. There is no tenderness.   Musculoskeletal: She exhibits edema.   Neurological: She is alert and oriented to person, place, and time.   Skin: Skin is warm. She is not diaphoretic.   Psychiatric: She has a normal mood and affect. Her behavior is normal.   Nursing note and vitals reviewed.      Labs:        Invalid input(s): [id number]      No results for input(s): SODIUM, POTASSIUM, CHLORIDE, CO2, BUN, CREATININE, MAGNESIUM, PHOSPHORUS, CALCIUM in the last 72 hours.  No results for input(s): ALTSGPT, ASTSGOT, ALKPHOSPHAT, TBILIRUBIN, DBILIRUBIN, GAMMAGT, AMYLASE, LIPASE, ALB, PREALBUMIN, GLUCOSE in the last 72 hours.  Recent Labs      18   1640   PROTHROMBTM  13.2   INR  1.03               Hemodynamics:  Temp (24hrs), Av.3 °C (97.3 °F), Min:36 °C (96.8 °F), Max:36.4 °C (97.6 °F)  Temperature: 36.3 °C (97.3 °F)  Pulse  Av.9  Min: 60  Max: 94   Blood Pressure: 111/71      Respiratory:    Respiration: 16, Pulse Oximetry: 93 %        RUL Breath Sounds: Clear, RML Breath Sounds: Clear, RLL Breath Sounds: Clear, LASHANDA Breath Sounds: Clear, LLL Breath Sounds: Clear  Fluids:    Intake/Output Summary (Last 24 hours) at 04/22/18 1136  Last data filed at 04/22/18 0300   Gross per 24 hour   Intake             1100 ml   Output             2912 ml   Net            -1812 ml        GI/Nutrition:  Orders Placed This Encounter   Procedures   • Diet Order     Standing Status:   Standing     Number of Occurrences:   1     Order Specific Question:   Diet:     Answer:   Regular [1]     Order Specific Question:   Electrolyte modifications:     Answer:   Low Potassium [3]     Medical Decision Making, by Problem:  Active Hospital Problems    Diagnosis   • *UTI (urinary tract infection) [N39.0]   • ESRD (end stage renal disease) on dialysis (CMS-HCC) [N18.6, Z99.2]   • Lupus (systemic lupus erythematosus) (CMS-HCC) [M32.9]   • Anemia [D64.9]   • Thrombocytopenia (CMS-HCC) [D69.6]   • Opioid dependence (CMS-HCC) [F11.20]   • Fibromyalgia [M79.7]   • Research study patient [Z00.6]   • Obesity (BMI 30-39.9) [E66.9]   • Hypertension [I10]       Quality-Core Measures   Reviewed items::  Labs reviewed    1 ESRD  2 AVG dysfunction: appreciate Dr Ardon help  3 Anemia  4 chronic pain  Plan  Renal dose all meds  Avoid nephrotoxins  Prognosis guarded  Can be D/C from renal point  D/W Dr Godinez

## 2018-04-22 NOTE — PROGRESS NOTES
Report received from JOSETTE Bui. Pt is AAOx4, no family at bedside. Assessment completed. Chest port accessed, IV pump ordered for TKO protocol for central lines, RUE HD graft noted. Pt is on MWF HD schedule. Pt complaining of pain 7/10 in her bilat flanks.  Medicated per MAR. Pt ambulates independently. Pt educated regarding plan of care, including vascular consultation, pain management, and port management. All questions answered. Call light and personal belongings in reach. No additional needs at this time.

## 2018-04-22 NOTE — PROGRESS NOTES
"[facility] Hospitalist Progress Note    Date of Service: 2018    Chief Complaint  37 y.o. female with ESRD 2/2 SLE having issues w/ her HD graft presents after missing 2 sessions 2/2 \"high pressures\" in graft and w/ UTI.      Interval Problem Update  Patient insisting she needs more pain medication  I discussed this with her-she was unhappy that I did not agree to this  Discussed with RN  Discussed with renal  Patient returned to hemodialysis this afternoon-unfortunately at the end of the session the pressures were again quite high eating that she probably has again developed stenosis  I notified vascular surgery     Consultants/Specialty  Nephrology  Vascular surgery    Disposition  Anticipate home        Review of Systems   Constitutional: Negative for chills and fever.   Respiratory: Negative for cough.    Cardiovascular: Negative for chest pain.   Gastrointestinal: Negative for abdominal pain, constipation, diarrhea, nausea and vomiting.   Genitourinary: Negative for dysuria, frequency and urgency.   Musculoskeletal: Positive for back pain (advised this is likely just her fibromyalgia).        Still complains of pain in graft area   Neurological: Negative for dizziness and headaches.      Physical Exam  Laboratory/Imaging   Hemodynamics  Temp (24hrs), Av.4 °C (97.5 °F), Min:36 °C (96.8 °F), Max:36.8 °C (98.3 °F)   Temperature: 36.4 °C (97.6 °F)  Pulse  Av.8  Min: 60  Max: 94    Blood Pressure: 113/63      Respiratory      Respiration: 16, Pulse Oximetry: 97 %        RUL Breath Sounds: Clear, RML Breath Sounds: Clear, RLL Breath Sounds: Diminished, LASHANDA Breath Sounds: Clear, LLL Breath Sounds: Diminished    Fluids    Intake/Output Summary (Last 24 hours) at 18 1805  Last data filed at 18 1526   Gross per 24 hour   Intake              900 ml   Output             2912 ml   Net            -2012 ml       Nutrition  Orders Placed This Encounter   Procedures   • Diet Order     Standing Status:   " Standing     Number of Occurrences:   1     Order Specific Question:   Diet:     Answer:   Regular [1]     Order Specific Question:   Electrolyte modifications:     Answer:   Low Potassium [3]     Physical Exam   Constitutional: She is oriented to person, place, and time. She appears well-developed and well-nourished. No distress.   HENT:   Head: Normocephalic and atraumatic.   Mouth/Throat: Oropharynx is clear and moist.   Eyes: EOM are normal. Pupils are equal, round, and reactive to light. Right eye exhibits no discharge. Left eye exhibits no discharge.   Neck: Neck supple.   Cardiovascular: Normal rate and regular rhythm.    Pulmonary/Chest: Effort normal and breath sounds normal.   Abdominal: Soft. Bowel sounds are normal. She exhibits no distension. There is no tenderness.   Musculoskeletal: Normal range of motion. She exhibits no edema or tenderness.   Dressings have been removed  Bruising is somewhat better  Thrill feels more normal  (Seen pre-HD)   Neurological: She is alert and oriented to person, place, and time. No cranial nerve deficit.   Skin: Skin is warm and dry. She is not diaphoretic.   Psychiatric:   She is not happy with me as I will not escalate her narcotics further   Nursing note and vitals reviewed.      Recent Labs      04/19/18   0415   WBC  3.6*   RBC  3.38*   HEMOGLOBIN  10.2*   HEMATOCRIT  32.9*   MCV  97.3   MCH  30.2   MCHC  31.0*   RDW  43.2   PLATELETCT  118*   MPV  10.8     Recent Labs      04/19/18   0415   SODIUM  138   POTASSIUM  5.1   CHLORIDE  104   CO2  22   GLUCOSE  82   BUN  62*   CREATININE  12.23*   CALCIUM  8.2*     Recent Labs      04/19/18   1640   INR  1.03                  Assessment/Plan     * UTI (urinary tract infection)- (present on admission)   Assessment & Plan    Ruled out  Antibiotics have been discontinued        ESRD (end stage renal disease) on dialysis (CMS-[facility])- (present on admission)   Assessment & Plan    Patient tolerated most of the dialysis session  today however at the end of the session the pressures were again noted to be high  Nephrology aware  Contacting vascular surgery           Lupus (systemic lupus erythematosus) (CMS-HCC)- (present on admission)   Assessment & Plan    With ESRD> on Plaquenil        Research study patient   Assessment & Plan    This patient has been recruited in the study: Efficacy of prophylactic oral Vancomycin in preventing recurrent Clostridium Difficile infection in hospitalized patients requiring antibiotics. Approximately 300 subjects will be randomized to one of three treatment arms in a 1:1:1 ratio (Vancomycin 125 mg PO every 12 hours for duration of antibiotic therapy, Vancomycin 125 mg PO every 24 hours for duration of antibiotic therapy, or no oral Vancomycin). Follow-up for endpoints will be done at 12 weeks following completion of antibiotic therapy.  Please contact study group before discharging this patient. This patient may need [facility] at discharge. [facility] will provide the required antibiotics.   Contact Information:  [email or web address]          Obesity (BMI 30-39.9)- (present on admission)   Assessment & Plan    Body mass index is 32.72 kg/m².          Hypertension- (present on admission)   Assessment & Plan    Controlled with current medication regimen.  Monitor.         Anemia- (present on admission)   Assessment & Plan    Normocytic and likely of chronic kidney disease.         Thrombocytopenia (CMS-HCC)- (present on admission)   Assessment & Plan    Without current evidence of bleed.  Monitor.         Opioid dependence (CMS-HCC)- (present on admission)   Assessment & Plan    Accessed  Verified patient receives   Tabs of 20mg oxycodone/ month  Updated Admission meds and reconciled  Patient again tried to get more pain medication today-I stated she was  already on twice of her home dose, she insisted this was not the case  I stated that I did review her records and found that she gets  tablets per month= 3 per day on average  She has the opportunity of receiving 6 tablets per day here. I told her I would not escalate therapy beyond this.        Fibromyalgia- (present on admission)   Assessment & Plan    On lyrica and high dose oxycodone          Quality-Core Measures   Reviewed items::  Labs reviewed and Medications reviewed  Taylor catheter::  No Taylor  DVT prophylaxis pharmacological::  Heparin  Antibiotics:  Treating active infection/contamination beyond 24 hours perioperative coverage  Assessed for rehabilitation services:  Patient returned to prior level of function, rehabilitation not indicated at this time

## 2018-04-22 NOTE — CARE PLAN
Problem: Safety  Goal: Will remain free from falls    Intervention: Assess risk factors for falls  Fall risk assessment completed      Problem: Pain Management  Goal: Pain level will decrease to patient's comfort goal    Intervention: Follow pain managment plan developed in collaboration with patient and Interdisciplinary Team  Medicated for pain per mar.

## 2018-04-22 NOTE — DISCHARGE INSTRUCTIONS
F/u with HD tomorrow   Call vascular surgery tomorrow for follow up      Dialysis  Dialysis is a procedure that replaces some of the work healthy kidneys do. It is done when you lose about 85-90% of your kidney function. It may also be done earlier if your symptoms may be improved by dialysis. During dialysis, wastes, salt, and extra water are removed from the blood, and the levels of certain chemicals in the blood (such as potassium) are maintained. Dialysis is done in sessions. Dialysis sessions are continued until the kidneys get better. If the kidneys cannot get better, such as in end-stage kidney disease, dialysis is continued for life or until you receive a new kidney (kidney transplant). There are two types of dialysis: hemodialysis and peritoneal dialysis.  What is hemodialysis?  Hemodialysis is a type of dialysis in which a machine called a dialyzer is used to filter the blood. Before beginning hemodialysis, you will have surgery to create a site where blood can be removed from the body and returned to the body (vascular access). There are three types of vascular accesses:  · Arteriovenous fistula. To create this type of access, an artery is connected to a vein (usually in the arm). A fistula takes 1-6 months to develop after surgery. If it develops properly, it usually lasts longer than the other types of vascular accesses. It is also less likely to become infected and cause blood clots.  · Arteriovenous graft. To create this type of access, an artery and a vein in the arm are connected with a tube. A graft may be used within 2-3 weeks of surgery.  · A venous catheter. To create this type of access, a thin, flexible tube (catheter) is placed in a large vein in your neck, chest, or groin. A catheter may be used right away. It is usually used as a temporary access when dialysis needs to begin immediately.  During hemodialysis, blood leaves the body through your access. It travels through a tube to the  dialyzer, where it is filtered. The blood then returns to your body through another tube.  Hemodialysis is usually performed by a health care provider at a hospital or dialysis center three times a week. Visits last about 3-4 hours. It may also be performed with the help of another person at home with training.  What is peritoneal dialysis?  Peritoneal dialysis is a type of dialysis in which the thin lining of the abdomen (peritoneum) is used as a filter. Before beginning peritoneal dialysis, you will have surgery to place a catheter in your abdomen. The catheter will be used to transfer a fluid called dialysate to and from your abdomen. At the start of a session, your abdomen is filled with dialysate. During the session, wastes, salt, and extra water in the blood pass through the peritoneum and into the dialysate. The dialysate is drained from the body at the end of the session. The process of filling and draining the dialysate is called an exchange. Exchanges are repeated until you have used up all the dialysate for the day.  Peritoneal dialysis may be performed by you at home or at almost any other location. It is done every day. You may need up to five exchanges a day. The amount of time the dialysate is in your body between exchanges is called a dwell. The dwell depends on the number of exchanges needed and the characteristics of the peritoneum. It usually varies from 1.5-3 hours. You may go about your day normally between exchanges. Alternately, the exchanges may be done at night while you sleep, using a machine called a cycler.  Which type of dialysis should I choose?  Both hemodialysis and peritoneal dialysis have advantages and disadvantages. Talk to your health care provider about which type of dialysis would be best for you. Your lifestyle and preferences should be considered along with your medical condition. In some cases, only one type of dialysis may be an option.  Advantages of hemodialysis  · It is  done less often than peritoneal dialysis.  · Someone else can do the dialysis for you.  · If you go to a dialysis center, your health care provider will be able to recognize any problems right away.  · If you go to a dialysis center, you can interact with others who are having dialysis. This can provide you with emotional support.  Disadvantages of hemodialysis  · Hemodialysis may cause cramps and low blood pressure. It may leave you feeling tired on the days you have the treatment.  · If you go to a dialysis center, you will need to make weekly appointments and work around the center’s schedule.  · You will need to take extra care when traveling. If you go to a dialysis center, you will need to make special arrangements to visit a dialysis center near your destination. If you are having treatments at home, you will need to take the dialyzer with you to your destination.  · You will need to avoid more foods than you would need to avoid on peritoneal dialysis.  Advantages of peritoneal dialysis  · It is less likely than hemodialysis to cause cramps and low blood pressure.  · You may do exchanges on your own wherever you are, including when you travel.  · You do not need to avoid as many foods as you do on hemodialysis.  Disadvantages of peritoneal dialysis  · It is done more often than hemodialysis.  · Performing peritoneal dialysis requires you to have dexterity of the hands. You must also be able to lift bags.  · You will have to learn sterilization techniques. You will need to practice them every day to reduce the risk of infection.  What changes will I need to make to my diet during dialysis?  Both hemodialysis and peritoneal dialysis require you to make some changes to your diet. For example, you will need to limit your intake of foods high in the minerals phosphorus and potassium. You will also need to limit your fluid intake. Your dietitian can help you plan meals. A good meal plan can improve your dialysis  and your health.  What should I expect when beginning dialysis?  Adjusting to the dialysis treatment, schedule, and diet can take some time. You may need to stop working and may not be able to do some of the things you normally do. You may feel anxious or depressed when beginning dialysis. Eventually, many people feel better overall because of dialysis. Some people are able to return to work after making some changes, such as reducing work intensity.  Where can I find more information?  · National Kidney Foundation: www.kidney.org  · American Association of Kidney Patients: www.aakp.org  · American Kidney Fund: www.kidneyfund.org  This information is not intended to replace advice given to you by your health care provider. Make sure you discuss any questions you have with your health care provider.  Document Released: 03/09/2004 Document Revised: 05/25/2017 Document Reviewed: 02/11/2014  [employer] Interactive Patient Education © 2017 [employer]      Discharge Instructions    Discharged to home by car with friend. Discharged via walking, hospital escort: Refused.  Special equipment needed: Not Applicable    Be sure to schedule a follow-up appointment with your primary care doctor or any specialists as instructed.     Discharge Plan:   Influenza Vaccine Indication: Patient Refuses    I understand that a diet low in cholesterol, fat, and sodium is recommended for good health. Unless I have been given specific instructions below for another diet, I accept this instruction as my diet prescription.   Other diet: Regular, low potassium    Special Instructions: None    · Is patient discharged on Warfarin / Coumadin?   No     Depression / Suicide Risk    As you are discharged from this [facility] facility, it is important to learn how to keep safe from harming yourself.    Recognize the warning signs:  · Abrupt changes in personality, positive or negative- including increase in energy   · Giving away  possessions  · Change in eating patterns- significant weight changes-  positive or negative  · Change in sleeping patterns- unable to sleep or sleeping all the time   · Unwillingness or inability to communicate  · Depression  · Unusual sadness, discouragement and loneliness  · Talk of wanting to die  · Neglect of personal appearance   · Rebelliousness- reckless behavior  · Withdrawal from people/activities they love  · Confusion- inability to concentrate     If you or a loved one observes any of these behaviors or has concerns about self-harm, here's what you can do:  · Talk about it- your feelings and reasons for harming yourself  · Remove any means that you might use to hurt yourself (examples: pills, rope, extension cords, firearm)  · Get professional help from the community (Mental Health, Substance Abuse, psychological counseling)  · Do not be alone:Call your Safe Contact- someone whom you trust who will be there for you.  · Call your local CRISIS HOTLINE 249-7377 or 752-196-2980  · Call your local Children's Mobile Crisis Response Team [place] (062) 704-8125 or www.[employer]  · Call the toll free National Suicide Prevention Hotlines   · National Suicide Prevention Lifeline 202-963-SKHL (1682)  · National Hope Line Network 800-SUICIDE (764-8218)

## 2018-04-22 NOTE — PROGRESS NOTES
Pt discharged to home with friend. Port de-accessed per protocol. Patient left with all of her belongings via WC with this RN as an escort to her car.

## 2018-04-22 NOTE — CARE PLAN
Problem: Safety  Goal: Will remain free from injury  Outcome: PROGRESSING AS EXPECTED  Patient has remained free from further injury this shift. Call lihgt is within reach, patient is rounded on hourly, and bed is locked in lowest position    Problem: Pain Management  Goal: Pain level will decrease to patient's comfort goal  Outcome: PROGRESSING AS EXPECTED  Patient's pain has been managed with the same regiment she states she takes at home, oxy 20. Patient has been able to sleep and is self ambulatory this shift with current levels of pain

## 2018-04-22 NOTE — PROGRESS NOTES
Awake, A&O, VSS, CHESTER, =.  Up in chair for breakfast.  Awaiting dialysis this am.  Medicated for pain per mar.  SHELBI incision site, [place].  Left chest port, SL.  Ambulates independently with a steady gait.  O2 used occasionally per patient report.  POC discussed, pt agrees and verbalizes understanding.  Hourly rounding in place, call light is within reach.  Assessment completed as charted.

## 2018-04-23 ENCOUNTER — PATIENT OUTREACH (OUTPATIENT)
Dept: HEALTH INFORMATION MANAGEMENT | Facility: OTHER | Age: 38
End: 2018-04-23

## 2018-04-23 NOTE — DISCHARGE SUMMARY
CHIEF COMPLAINT ON ADMISSION  Chief Complaint   Patient presents with   • Vascular Access Problem   • Flank Pain       CODE STATUS  Prior    HPI & HOSPITAL COURSE  This is a 37 y.o. Female w/ h/o chronic pain and ESRD 2/2 SLE here with concern for possible pyelonephritis and graft dysfunction.  At presentation she complained of severe back/ flank pain and UA was concerning for UTI- however subsequent cultures were negative. Antibiotics were discontinued. She continued to have complaints of severe pain and despite being drowsy much of time was asking for more pain meds. Her home dose was TID prn and she was allowed q4 prn here but she was not satisfied.     Her graft had high pressures 2/2 stenosis- She underwent angioplasty by Dr Ardon on 4/19 w/ transient improvement but by Saturday's HD the high pressures were back.  Dr Lesly Jansen was contacted. The type of stent needed for this has to be ordered and patient will be brought back for the procedure once it is available. This was explained to patient.    She was anxious for DC and was agreeable w/ OP HD. Nephrology was OK with plan.    The patient met 2-midnight criteria for an inpatient stay at the time of discharge.    Therefore, she is discharged in good and stable condition with close outpatient follow-up.    SPECIFIC OUTPATIENT FOLLOW-UP  HD  Vascular Surgery    DISCHARGE PROBLEM LIST  Principal Problem:    UTI (urinary tract infection) POA: Yes  Active Problems:    ESRD (end stage renal disease) on dialysis (CMS-HCC) POA: Yes    Lupus (systemic lupus erythematosus) (CMS-HCC) POA: Yes    Fibromyalgia POA: Yes    Opioid dependence (CMS-HCC) POA: Yes    Thrombocytopenia (CMS-HCC) POA: Yes    Anemia POA: Yes    Hypertension POA: Yes    Obesity (BMI 30-39.9) POA: Yes    Research study patient POA: Unknown      Overview: This patient has been recruited in the study: Efficacy of prophylactic       oral Vancomycin in preventing recurrent Clostridium Difficile  infection in       hospitalized patients requiring antibiotics. Approximately 300 subjects       will be randomized to one of three treatment arms in a 1:1:1 ratio       (Vancomycin 125 mg PO every 12 hours for duration of antibiotic therapy,       Vancomycin 125 mg PO every 24 hours for duration of antibiotic therapy, or       no oral Vancomycin). Follow-up for endpoints will be done at 12 weeks       following completion of antibiotic therapy.      Please contact study group before discharging this patient. This patient       may need Research Antib[facility] at discharge. [facility] will provide       the required antibiotics.       Contact Information:      [email or web address]              Resolved Problems:    * No resolved hospital problems. *      FOLLOW UP  Future Appointments  Date Time Provider Department Center   5/1/2018 1:30 PM Quinn Chandler M.D. [facility] None     Sushila Manzano M.D.  [address] [place] [id number]  [phone]            MEDICATIONS ON DISCHARGE   Debby Carrizales   Home Medication Instructions CONI:[id number]    Printed on:04/23/18 1225   Medication Information                      ascorbic acid (ASCORBIC ACID) 500 MG Tab  Take 500 mg by mouth every day.             buPROPion (WELLBUTRIN XL) 150 MG XL tablet  Take 150 mg by mouth every morning.             cholecalciferol (VITAMIN D3) 5000 UNIT Cap  Take 10,000 Units by mouth every day.             cinacalcet (SENSIPAR) 30 MG Tab  Take 30 mg by mouth every bedtime.             ferrous sulfate 325 (65 FE) MG tablet  Take 325 mg by mouth every day.             hydroxychloroquine (PLAQUENIL) 200 MG Tab  Take 200 mg by mouth every bedtime.             hydrOXYzine HCl (ATARAX) 50 MG Tab  Take 1 Tab by mouth 3 times a day as needed for Anxiety.             lidocaine (LIDODERM) 5 %  Patch  Apply 1 Patch to skin as directed every 24 hours. Apply to back             Oxycodone HCl 20 MG Tab  Take 20 mg by mouth every 6 hours as needed.             pregabalin (LYRICA) 150 MG Cap  Take 150 mg by mouth 3 times a day.             sevelamer carbonate (RENVELA) 800 MG Tab tablet  Take 2,400 mg by mouth 3 times a day, with meals.             tizanidine (ZANAFLEX) 4 MG Tab  Take 2 Tabs by mouth every bedtime.             trazodone (DESYREL) 50 MG Tab  Take 1 Tab by mouth every bedtime.                 DIET  Low potassium    ACTIVITY  As tolerated.        CONSULTATIONS  Vascular Surgery- Duglas  Nephrology- Najjar    PROCEDURES  DATE OF SERVICE:  04/19/2018     PREOPERATIVE DIAGNOSIS:  Stage V kidney disease with failing right arm   arteriovenous graft secondary to outflow stenosis.     POSTOPERATIVE DIAGNOSIS:  Stage V kidney disease with failing right arm   arteriovenous graft secondary to outflow stenosis.     OPERATIONS:  Right arm AV graft fistulogram.  Balloon angioplasty, 7mm   Conquest, venous outflow anastomosis.     SURGEON:  Shabbir Ardon MD     ANESTHESIA:  General anesthesia.     ANESTHESIOLOGIST:  Mary Arrieta MD     DESCRIPTION OF PROCEDURE:  After obtaining informed consent, the patient was   placed on the angiography table and put under general anesthesia.  A time-out   was performed.  ChloraPrep was used on the right upper extremity and the   patient was draped sterilely.  With ultrasound and with local anesthesia of 1%   Xylocaine, I placed a micro dilator in the distal one-third of the arm and   directed toward the axilla.  Through the micro dilator, contrast was injected   and a 2 cm long pinhole stenosis was evident at the outflow.  The axillary   vein proximal to this and into the central veins was widely patent.  I placed   a 6-French sheath and dilated the stenosis with a 7-French Conquest.  I was   fairly aggressive because he is tender, rebound.  There was a small leak  that   was created at the anastomosis, but it was definitely improved in terms of   diameter of the outflow.  The extravasation did not persist more than through   1 or 2 venograms.  The flow was brisk.  I inflated the balloon in the fistula   and injecting through the sheath and this refluxed contrast through the   arterial end of the graft into the brachial artery.  There are some   irregularities and it is smaller in diameter, but I do not believe it is flow   restricting.  The main issue with the outflow stenosis.  We did a formal   central venogram as I have stated, and that was satisfactory.  I reinflated   the balloon to partial nominal atmospheres of 5-6 and held it up for 20-30   seconds.  I then did a final fistulogram and was satisfied that we had   resolution of high-grade stenosis, although there is a generalized narrowing.    The only way to resolve this would be with stenting, which I do not believe,   should be done at this time.  The patient tolerated the procedure well.  I put   a 5-0 Prolene in the exit site to control bleeding from the angio entry   point.  Blood loss was less than 3 mL.  A dressing was applied.  She was taken   back to the shen and the fistula is ready for use.        ____________________________________     MD RUBEN Jackson / NTS     DD:  04/19/2018 19:09:12  DT:  04/19/2018 20:26:37     D#:  [id number]  Job#:  [id number]         Last signed by: Shabbir Ardon M.D. at 4/20/2018  1:19 PM         LABORATORY  Lab Results   Component Value Date/Time    SODIUM 138 04/19/2018 04:15 AM    POTASSIUM 5.1 04/19/2018 04:15 AM    CHLORIDE 104 04/19/2018 04:15 AM    CO2 22 04/19/2018 04:15 AM    GLUCOSE 82 04/19/2018 04:15 AM    BUN 62 (H) 04/19/2018 04:15 AM    CREATININE 12.23 (HH) 04/19/2018 04:15 AM    CREATININE 0.9 12/13/2008 12:05 AM        Lab Results   Component Value Date/Time    WBC 3.6 (L) 04/19/2018 04:15 AM    HEMOGLOBIN 10.2 (L) 04/19/2018 04:15 AM    HEMATOCRIT 32.9  (L) 04/19/2018 04:15 AM    PLATELETCT 118 (L) 04/19/2018 04:15 AM        Total time of the discharge process exceeds 40 minutes

## 2018-04-25 ENCOUNTER — PATIENT OUTREACH (OUTPATIENT)
Dept: HEALTH INFORMATION MANAGEMENT | Facility: OTHER | Age: 38
End: 2018-04-25

## 2018-04-30 ENCOUNTER — TELEPHONE (OUTPATIENT)
Dept: PHYSICAL MEDICINE AND REHAB | Facility: MEDICAL CENTER | Age: 38
End: 2018-04-30

## 2018-04-30 NOTE — TELEPHONE ENCOUNTER
Debby said she had call from [facility] asking if she had bone length test done. She said she has not had test done because when she had Xrays done they were not able to do test at [facility].

## 2018-05-01 ENCOUNTER — OFFICE VISIT (OUTPATIENT)
Dept: PHYSICAL MEDICINE AND REHAB | Facility: MEDICAL CENTER | Age: 38
End: 2018-05-01
Payer: MEDICARE

## 2018-05-01 VITALS
DIASTOLIC BLOOD PRESSURE: 87 MMHG | SYSTOLIC BLOOD PRESSURE: 128 MMHG | TEMPERATURE: 98.8 F | BODY MASS INDEX: 32.15 KG/M2 | HEART RATE: 92 BPM | WEIGHT: 193 LBS | HEIGHT: 65 IN | OXYGEN SATURATION: 96 %

## 2018-05-01 DIAGNOSIS — G89.29 OTHER CHRONIC PAIN: ICD-10-CM

## 2018-05-01 DIAGNOSIS — G62.9 PERIPHERAL POLYNEUROPATHY: ICD-10-CM

## 2018-05-01 DIAGNOSIS — N18.6 ESRD (END STAGE RENAL DISEASE) ON DIALYSIS (HCC): ICD-10-CM

## 2018-05-01 DIAGNOSIS — Z99.2 ESRD (END STAGE RENAL DISEASE) ON DIALYSIS (HCC): ICD-10-CM

## 2018-05-01 PROCEDURE — 99213 OFFICE O/P EST LOW 20 MIN: CPT | Mod: 25 | Performed by: PHYSICAL MEDICINE & REHABILITATION

## 2018-05-01 PROCEDURE — 95909 NRV CNDJ TST 5-6 STUDIES: CPT | Performed by: PHYSICAL MEDICINE & REHABILITATION

## 2018-05-01 PROCEDURE — 95886 MUSC TEST DONE W/N TEST COMP: CPT | Mod: RT | Performed by: PHYSICAL MEDICINE & REHABILITATION

## 2018-05-01 RX ORDER — OXYCODONE HYDROCHLORIDE 20 MG/1
20 TABLET ORAL EVERY 6 HOURS PRN
Qty: 102 TAB | Refills: 0 | Status: SHIPPED | OUTPATIENT
Start: 2018-05-01 | End: 2018-05-29 | Stop reason: SDUPTHER

## 2018-05-01 ASSESSMENT — PAIN SCALES - GENERAL: PAINLEVEL: 7=MODERATE-SEVERE PAIN

## 2018-05-01 NOTE — PROGRESS NOTES
[facility]  PHYSIATRY    MD Billy Treviño [place]    [address], [address]  [place] [address]  Phone [phone]    Test Date:  2018      Patient Name: Debby Carrizales :  Physician: Quinn Chandler MD   Medical Record #:  Sex: Female Referring Provider: Quinn Chandler MD     HISTORY: Debby Carrizales presents for electrodiagnostic evaluation of symptoms in upper extremities.  She does have symptoms of neck pain and bilateral arm pain.  She has burning pain her feet bilaterally.  Her symptoms started in .  The feet throb intermittently.      We discussed that she has had some improvement of her pain tolerance after the suggestion of taking an additional oxycodone immediately after dialysis. She continues to take oxyodone 20mg three times a day, in addition to this additional post-dialysis dose. Since her last visit, she has had trouble with her dialysis access and they are working on improving this, surgery tomorrow.  Dialysis has been every day for four hours at a slower rate.    Her past medical history is negative for diabetes, thyroid disease, HIV, cervical or lumbar spine surgery and no history of cancer.    PHYSICAL EXAM:  Exam unchanged since 2018    NCV / EMG FINDINGS:  • Evaluation of the Left peroneal motor nerve showed reduced amplitude, decreased conduction velocity (B Fib-Ankle), and decreased conduction velocity (Poplt-B Fib).  • The Right peroneal motor nerve showed reduced amplitude and decreased conduction velocity (B Fib-Ankle).  • The Right tibial motor nerve showed decreased conduction velocity (Knee-Ankle).  • The Right sural sensory nerve showed no response (Calf) and no response (Site 2).  • All remaining nerves (as indicated in the preceding tables) were within normal limits.  • All left vs. right side differences were within normal limits.  • F Wave studies indicate that the Right tibial F wave has prolonged latency  (53.87 ms).  • Needle evaluation of the Right anterior tibialis, the Right peroneus longus, the Right gastroc, the Right biceps femoris (short head), the Right vastus medialis, the Right vastus lateralis, the Left upper lumbosacral paraspinal, the Left mid lumbosacral paraspinal, and the Left low lumbosacral paraspinal muscles were unremarkable.    IMPRESSION:  Abnormal study  1. There is electrodiagnostic evidence that suggest sensorimotor peripheral polyneuropathy.   2. Today's electrodiagnostic findings point against:      A. Right lumbosacral radiculopathy    Thank you for allowing me to perform neurodiagnostic testing on your patient. If you have any further questions or comments, please do not hesitate to call.      ___________________________  Quinn Chandler MD  Physical Medicine and Rehabilitation        Nerve Conduction Studies  Anti Sensory Summary Table     Site NR Peak (ms) Norm Peak (ms) P-T Amp (µV) Norm P-T Amp Site1 Site2 Delta-P (ms) Dist (cm) Jefferson (m/s) Norm Jefferson (m/s)   Right Sural Anti Sensory (Lat Mall)  22°C   Calf NR  <4.4  >5.0 Calf Lat Mall  14.0  >35   Site 2 NR               Motor Summary Table     Site NR Onset (ms) Norm Onset (ms) O-P Amp (mV) Norm O-P Amp Site1 Site2 Delta-0 (ms) Dist (cm) Jefferson (m/s) Norm Jefferson (m/s)   Left Peroneal Motor (Ext Dig Brev)  29°C   Ankle    5.7 <6.0 1.0 >2.5 B Fib Ankle 10.7 31.0 29 >40   B Fib    16.4  0.5  Poplt B Fib 2.3 9.0 39 >40   Poplt    18.7  0.5          Right Peroneal Motor (Ext Dig Brev)  27°C   Ankle    4.6 <6.0 1.4 >2.5 B Fib Ankle 8.8 32.5 37 >40   B Fib    13.4  1.4  Poplt B Fib 1.6 7.5 47 >40   Poplt    15.0  1.2          Left Tibial Motor (Abd Lyn Brev)  27.9°C   Ankle    3.9 <6.0 11.6 >5.0 Knee Ankle 9.3 38.0 41 >40   Knee    13.2  6.8          Right Tibial Motor (Abd Lyn Brev)  28°C   Ankle    5.2 <6.0 11.4 >5.0 Knee Ankle 10.2 37.0 36 >40   Knee    15.4  1.9            F Wave Studies     NR F-Lat (ms) Lat Norm (ms) L-R F-Lat (ms) L-R Lat  Norm   Right Tibial (Mrkrs) (Abd Hallucis)  27.7°C      53.87 <0.0  <5.7     EMG     Side Muscle Nerve Root Ins Act Fibs Psw Amp Dur Poly Recrt Int Pat Comment   Right AntTibialis Dp Br Peron L4-5 Nml Nml Nml Nml Nml 0 Nml Nml    Right Peroneus Long Sup Br Peron L5-S1 Nml Nml Nml Nml Nml 0 Nml Nml    Right Gastroc Tibial S1-2 Nml Nml Nml Nml Nml 0 Nml Nml    Right BicepsFemS Sciatic L5-S1 Nml Nml Nml Nml Nml 0 Nml Nml    Right VastusMed Femoral L2-4 Nml Nml Nml Nml Nml 0 Nml Nml    Right VastusLat Femoral L2-4 Nml Nml Nml Nml Nml 0 Nml Nml    Right Lumbo Parasp Up Rami L1-2 Nml Nml Nml   0      Right Lumbo Parasp Mid Rami L3-4 Nml Nml Nml   0      Right Lumbo Parasp Low Rami L5-S1 Nml Nml Nml   0          Nerve Conduction Studies  Anti Sensory Left/Right Comparison     Site L Lat (ms) R Lat (ms) L-R Lat (ms) L Amp (µV) R Amp (µV) L-R Amp (%) Site1 Site2 L Jefferson (m/s) R Jefferson (m/s) L-R Jefferson (m/s)   Sural Anti Sensory (Lat Mall)  22°C   Calf  NR     Calf Lat Mall      Site 2  NR              Motor Left/Right Comparison     Site L Lat (ms) R Lat (ms) L-R Lat (ms) L Amp (mV) R Amp (mV) L-R Amp (%) Site1 Site2 L Jefferson (m/s) R Jefferson (m/s) L-R Jefferson (m/s)   Peroneal Motor (Ext Dig Brev)  29°C   Ankle 5.7 4.6 1.1 1.0 1.4 28.6 B Fib Ankle 29 37 8   B Fib 16.4 13.4 3.0 0.5 1.4 64.3 Poplt B Fib 39 47 8   Poplt 18.7 15.0 3.7 0.5 1.2 58.3        Tibial Motor (Abd Lyn Brev)  27.9°C   Ankle 3.9 5.2 1.3 11.6 11.4 1.7 Knee Ankle 41 36 5   Knee 13.2 15.4 2.2 6.8 1.9 72.1              Waveforms:                   HISTORY    Please see new patient note by Dr. Chandler,  for more details.     ROS Red Flags :   Fever, Chills, Sweats: Denies  Involuntary Weight Loss: Denies  Bowel/Bladder Incontinence: Denies  Saddle Anesthesia: Denies      PMHx:   Past Medical History:   Diagnosis Date   • Arthritis     all joints,r/t lupus   • Avascular necrosis of bones of both hips (HCC) 10/10/2016   • Clostridium difficile colitis 5/3/2011   • Dialysis patient       • ESBL (extended spectrum beta-lactamase) producing bacteria infection 8/25/2014   • Fibromyalgia    • Hypertension    • Lupus    • Pneumonia    • Psychiatric disorder     anxiety, depression   • Pyelonephritis 11/21/2017   • Renal failure        PSHx:   Past Surgical History:   Procedure Laterality Date   • AV FISTULA CREATION Right 12/9/2017    Procedure: AV FISTULA CREATION-ARM FOR GRAFT;  Surgeon: Lesly Jansen M.D.;  Location: [facility];  Service: General   • THROMBECTOMY  12/9/2017    Procedure: THROMBECTOMY;  Surgeon: Lesly Jansen M.D.;  Location: [facility];  Service: General   • THROMBECTOMY Right 8/21/2016    Procedure: THROMBECTOMY - right AV fistula graft with grams;  Surgeon: Shabbir Ardon M.D.;  Location: [facility];  Service:    • ANGIOPLASTY BALLOON  8/21/2016    Procedure: ANGIOPLASTY BALLOON;  Surgeon: Shabbir Ardon M.D.;  Location: [facility];  Service:    • THROMBECTOMY Right 8/20/2016    Procedure: THROMBECTOMY AV GRAFT;  Surgeon: Shabbir Ardon M.D.;  Location: [facility];  Service:    • AV FISTULA CREATION Right 7/12/2016    Procedure: AV FISTULA CREATION WITH GRAFT BRACHIAL AXILLARY;  Surgeon: Shabbir Ardon M.D.;  Location: [facility];  Service:    • CLOSED REDUCTION Right 7/5/2016    Procedure: CLOSED REDUCTION- Hip ;  Surgeon: Michael Holman M.D.;  Location: [facility];  Service:    • HIP ARTHROPLASTY TOTAL Right 1/18/2016    Procedure: HIP ARTHROPLASTY TOTAL;  Surgeon: Michael Holman M.D.;  Location: [facility];  Service:    • AV FISTULA CREATION  2/3/2015    Performed by Shabbir Ardon M.D. at [facility]   • AV FISTULA CREATION  11/14/2014    Performed by Shabbir Ardon M.D. at [facility]   • AV FISTULA CREATION  9/9/2014    Performed by Shabbir Ardon M.D. at [facility]   • CATH PLACEMENT  9/9/2014     Performed by Shabbir Ardon M.D. at SURGERY [facility] ORS   • OTHER  5/2011    tracheostomy   • GASTROSCOPY-ENDO  4/27/2011    Performed by PALMIRA DOAN at ENDOSCOPY [facility] ORS   • COLONOSCOPY WITH BIOPSY  4/20/2011    Performed by ALCIRA CRUZ at ENDOSCOPY [facility] ORS   • GASTROSCOPY-ENDO  4/18/2011    Performed by ALCIRA CRUZ at ENDOSCOPY [facility] ORS   • GASTROSCOPY-ENDO  4/10/2011    Performed by SYED JURADO at ENDOSCOPY [facility] ORS   • SCLEROTHERAPHY  4/10/2011    Performed by SYED JURADO at ENDOSCOPY [facility] ORS   • OTHER ABDOMINAL SURGERY      kidney biopsy   • TRACHEOSTOMY         Family history   Denies neuromuscular disease  Family History   Problem Relation Age of Onset   • Heart Disease Mother    • Cancer Father          Medications:   Current Outpatient Prescriptions   Medication   • Oxycodone HCl 20 MG Tab   • cinacalcet (SENSIPAR) 30 MG Tab   • pregabalin (LYRICA) 150 MG Cap   • sevelamer carbonate (RENVELA) 800 MG Tab tablet   • lidocaine (LIDODERM) 5 % Patch   • ferrous sulfate 325 (65 FE) MG tablet   • ascorbic acid (ASCORBIC ACID) 500 MG Tab   • buPROPion (WELLBUTRIN XL) 150 MG XL tablet   • hydroxychloroquine (PLAQUENIL) 200 MG Tab   • cholecalciferol (VITAMIN D3) 5000 UNIT Cap   • trazodone (DESYREL) 50 MG Tab   • tizanidine (ZANAFLEX) 4 MG Tab   • hydrOXYzine HCl (ATARAX) 50 MG Tab     No current facility-administered medications for this visit.         Allergies:   Allergies   Allergen Reactions   • Ativan Anxiety     Patient becomes severely paranoid and agitated   • Morphine Itching     Tolerates Dilaudid   • Seasonal Runny Nose and Itching     Hay fever, sabiha brush       Social Hx:   Social History     Social History   • Marital status: Single     Spouse name: N/A   • Number of children: N/A   • Years of education: N/A     Occupational History   • Not on file.     Social History Main Topics   • Smoking status: Former Smoker      "Packs/day: 0.50     Years: 18.00     Types: Cigarettes     Quit date: 6/13/2011   • Smokeless tobacco: Never Used      Comment: 1/2 ppd   • Alcohol use No      Comment: occ   • Drug use: No      Comment: occ   • Sexual activity: Not on file     Other Topics Concern   •  Service No   • Blood Transfusions Yes   • Caffeine Concern No   • Occupational Exposure No   • Hobby Hazards No   • Sleep Concern Yes   • Stress Concern Yes   • Weight Concern Yes   • Special Diet Yes   • Back Care No   • Exercise Yes   • Bike Helmet No   • Seat Belt Yes   • Self-Exams Yes     Social History Narrative   • No narrative on file         EXAMINATION     Physical Exam:   Vitals: Blood pressure 128/87, pulse 92, temperature 37.1 °C (98.8 °F), height 1.651 m (5' 5\"), weight 87.5 kg (193 lb), SpO2 96 %.    Unchanged from exam 04/12/2018    MEDICAL DECISION MAKING    DATA    Labs:   Lab Results   Component Value Date/Time    SODIUM 138 04/19/2018 04:15 AM    POTASSIUM 5.1 04/19/2018 04:15 AM    CHLORIDE 104 04/19/2018 04:15 AM    CO2 22 04/19/2018 04:15 AM    GLUCOSE 82 04/19/2018 04:15 AM    BUN 62 (H) 04/19/2018 04:15 AM    CREATININE 12.23 (HH) 04/19/2018 04:15 AM    CREATININE 0.9 12/13/2008 12:05 AM        Lab Results   Component Value Date/Time    PROTHROMBTM 13.2 04/19/2018 04:40 PM    INR 1.03 04/19/2018 04:40 PM        Lab Results   Component Value Date/Time    WBC 3.6 (L) 04/19/2018 04:15 AM    RBC 3.38 (L) 04/19/2018 04:15 AM    HEMOGLOBIN 10.2 (L) 04/19/2018 04:15 AM    HEMATOCRIT 32.9 (L) 04/19/2018 04:15 AM    MCV 97.3 04/19/2018 04:15 AM    MCH 30.2 04/19/2018 04:15 AM    MCHC 31.0 (L) 04/19/2018 04:15 AM    MPV 10.8 04/19/2018 04:15 AM    NEUTSPOLYS 47.20 04/19/2018 04:15 AM    LYMPHOCYTES 36.00 04/19/2018 04:15 AM    MONOCYTES 12.90 04/19/2018 04:15 AM    EOSINOPHILS 2.20 04/19/2018 04:15 AM    BASOPHILS 1.40 04/19/2018 04:15 AM    HYPOCHROMIA 1+ 07/26/2017 07:10 AM    ANISOCYTOSIS 1+ 07/27/2017 05:26 AM        Lab " Results   Component Value Date/Time    HBA1C 5.3 04/18/2018 03:50 AM          Imaging: No new imaging studies to review                                                                          DIAGNOSIS   Visit Diagnoses     ICD-10-CM   1. ESRD (end stage renal disease) on dialysis (HCC) N18.6    Z99.2   2. Peripheral polyneuropathy (HCC) G62.9   3. Other chronic pain G89.29         ASSESSMENT and PLAN:     Debby Carrizales 37 y.o. female  With ESRD and peripheral polyneuropathy    Debby was seen today for follow-up.    Diagnoses and all orders for this visit:    ESRD (end stage renal disease) on dialysis (HCC)  -     Oxycodone HCl 20 MG Tab; Take 1 Tab by mouth every 6 hours as needed (Take up to three a day as needed and one additional pill after dialysis (3 times a week)) for up to 30 days.  -     CONSENT FOR OPIATE PRESCRIPTION    Peripheral polyneuropathy (HCC)  -     Oxycodone HCl 20 MG Tab; Take 1 Tab by mouth every 6 hours as needed (Take up to three a day as needed and one additional pill after dialysis (3 times a week)) for up to 30 days.  -     CONSENT FOR OPIATE PRESCRIPTION    Other chronic pain  -     Oxycodone HCl 20 MG Tab; Take 1 Tab by mouth every 6 hours as needed (Take up to three a day as needed and one additional pill after dialysis (3 times a week)) for up to 30 days.  -     CONSENT FOR OPIATE PRESCRIPTION    Discussed that we will continue with the current management of oxyodone 20mg po tid and one additional dose after dialysis.  Consent obtained.  ORT and  reviewed.    We discussed that there are multiple possible etiologies for her peripheral neuropathy.  ESRD is one possible etiology.  For now, plan to evaluate for a few other possible treatment causes.  Labs entered.    Follow up: 1 month    Thank you for allowing me to participate in the care of this patient. If you have any questions please not hesitate to contact me.      Please note that this dictation was created using  voice recognition software. I have made every reasonable attempt to correct obvious errors but there may be errors of grammar and content that I may have overlooked prior to finalization of this note.      Quinn Chandler MD  Interventional Spine and Sports Physiatry  Physical Medicine and Rehabilitation  [facility]  5/1/2018 1:59 PM

## 2018-05-02 ENCOUNTER — HOSPITAL ENCOUNTER (OUTPATIENT)
Facility: MEDICAL CENTER | Age: 38
End: 2018-05-02
Attending: SURGERY | Admitting: SURGERY
Payer: MEDICARE

## 2018-05-02 ENCOUNTER — APPOINTMENT (OUTPATIENT)
Dept: RADIOLOGY | Facility: MEDICAL CENTER | Age: 38
End: 2018-05-02
Attending: SURGERY
Payer: MEDICARE

## 2018-05-02 VITALS
WEIGHT: 190 LBS | OXYGEN SATURATION: 100 % | HEIGHT: 65 IN | SYSTOLIC BLOOD PRESSURE: 143 MMHG | RESPIRATION RATE: 18 BRPM | TEMPERATURE: 97.2 F | HEART RATE: 74 BPM | DIASTOLIC BLOOD PRESSURE: 86 MMHG | BODY MASS INDEX: 31.65 KG/M2

## 2018-05-02 DIAGNOSIS — N18.6 END STAGE RENAL DISEASE (HCC): ICD-10-CM

## 2018-05-02 LAB
ANION GAP SERPL CALC-SCNC: 11 MMOL/L (ref 0–11.9)
BUN SERPL-MCNC: 38 MG/DL (ref 8–22)
CALCIUM SERPL-MCNC: 8.1 MG/DL (ref 8.5–10.5)
CHLORIDE SERPL-SCNC: 104 MMOL/L (ref 96–112)
CO2 SERPL-SCNC: 23 MMOL/L (ref 20–33)
CREAT SERPL-MCNC: 7.38 MG/DL (ref 0.5–1.4)
GLUCOSE SERPL-MCNC: 90 MG/DL (ref 65–99)
HCG SERPL QL: NEGATIVE
POTASSIUM SERPL-SCNC: 4.3 MMOL/L (ref 3.6–5.5)
SODIUM SERPL-SCNC: 138 MMOL/L (ref 135–145)

## 2018-05-02 PROCEDURE — 700101 HCHG RX REV CODE 250

## 2018-05-02 PROCEDURE — 160002 HCHG RECOVERY MINUTES (STAT)

## 2018-05-02 PROCEDURE — 700111 HCHG RX REV CODE 636 W/ 250 OVERRIDE (IP)

## 2018-05-02 PROCEDURE — 80048 BASIC METABOLIC PNL TOTAL CA: CPT

## 2018-05-02 PROCEDURE — C1894 INTRO/SHEATH, NON-LASER: HCPCS

## 2018-05-02 PROCEDURE — 84703 CHORIONIC GONADOTROPIN ASSAY: CPT

## 2018-05-02 RX ORDER — ONDANSETRON 2 MG/ML
4 INJECTION INTRAMUSCULAR; INTRAVENOUS EVERY 4 HOURS PRN
Status: DISCONTINUED | OUTPATIENT
Start: 2018-05-02 | End: 2018-05-02 | Stop reason: HOSPADM

## 2018-05-02 RX ORDER — LIDOCAINE HYDROCHLORIDE 10 MG/ML
INJECTION, SOLUTION INFILTRATION; PERINEURAL
Status: COMPLETED
Start: 2018-05-02 | End: 2018-05-02

## 2018-05-02 RX ORDER — IODIXANOL 270 MG/ML
12 INJECTION, SOLUTION INTRAVASCULAR ONCE
Status: COMPLETED | OUTPATIENT
Start: 2018-05-02 | End: 2018-05-02

## 2018-05-02 RX ORDER — OXYCODONE HYDROCHLORIDE AND ACETAMINOPHEN 5; 325 MG/1; MG/1
1 TABLET ORAL EVERY 4 HOURS PRN
Status: DISCONTINUED | OUTPATIENT
Start: 2018-05-02 | End: 2018-05-02 | Stop reason: HOSPADM

## 2018-05-02 RX ORDER — MIDAZOLAM HYDROCHLORIDE 1 MG/ML
INJECTION INTRAMUSCULAR; INTRAVENOUS
Status: DISCONTINUED
Start: 2018-05-02 | End: 2018-05-02 | Stop reason: HOSPADM

## 2018-05-02 RX ADMIN — LIDOCAINE HYDROCHLORIDE: 10 INJECTION, SOLUTION INFILTRATION; PERINEURAL at 10:35

## 2018-05-02 RX ADMIN — IODIXANOL 12 ML: 270 INJECTION, SOLUTION INTRAVASCULAR at 10:40

## 2018-05-02 RX ADMIN — HEPARIN: 100 SYRINGE at 12:15

## 2018-05-02 ASSESSMENT — PAIN SCALES - GENERAL
PAINLEVEL_OUTOF10: 0

## 2018-05-02 NOTE — OP REPORT
DATE OF SERVICE:  05/02/2018    PREOPERATIVE DIAGNOSIS:  Dysfunctional right arm arteriovenous graft.    POSTPROCEDURE DIAGNOSIS:  Dysfunctional right arm arteriovenous graft.    PROCEDURE:  Right arm angiogram and right arm fistulogram with angioplasty.    SURGEON:  Lesly Jansen MD    ANESTHESIOLOGIST:  Shantell Do MD    ANESTHESIA:  General.    INDICATIONS:  The patient is a 37-year-old woman with a right arm   arteriovenous graft.  This graft, despite a patch angioplasty has persistent   outflow stenosis.  She was dilated with a 7 mm balloon at her last treatment   and, Dr. Ardon felt this could be improved upon.  We are considering placement   of a covered stent, but this is not available at today's treatment.  Risks   and benefits were explained and include bleeding, infection, arteriovenous   thrombosis and possible surgery to treat complications.  She understands and   agrees to proceed.    DESCRIPTION OF PROCEDURE:  Patient was taken to the angio suite, placed in   supine position.  After adequate induction of general anesthesia, the right   arm was prepped circumferentially.  Cloth towels and paper drapes were placed.    Access to the graft was obtained with direct puncture after administration   of local anesthesia.  I used a micropuncture needle and an 0.018 wire.  The   micropuncture sheath was threaded and a fistulogram performed.  I then   exchanged over an 0.035 wire for a 6-Tunisian sheath.  The 6-Tunisian sheath was   used to provide further imaging and an 0.018 guidewire placed through the   arteriovenous anastomosis.  I then used a 6 mm x 1.5 mm cutting balloon and   performed angioplasty at the thigh arteriovenous anastomosis.  Initial wasting   resolved and I dilated the area 3 separate times rotating the balloon to get   the maximum efficiency of the cutting aspect of the balloon.    I then exchanged for an 8 mm x 4 cm balloon and only slight wasting was   identified with persistent  inflation of the balloon.  We performed a   retrograde angiogram of the arterial anastomosis and no evidence of stenosis   was identified.  The balloon was deflated and final images showed no real   improvement.  This makes me wonder how much of the images are due to rapid   outflow and contrast flow through the arteriovenous anastomosis.    There were no apparent complications.  The sheath was removed and rapid   hemostasis obtained.    The patient received 12 mL of Visipaque contrast, had a total fluoro time of   2.5 minutes and received 5.23 mGy of radiation.       ____________________________________     MD MARBELLA Lynn / ISSAC    DD:  05/02/2018 11:11:15  DT:  05/02/2018 12:10:51    D#:  [id number]  Job#:  [id number]

## 2018-05-02 NOTE — DISCHARGE INSTRUCTIONS
ACTIVITY: Rest and take it easy for the first 24 hours.  A responsible adult is recommended to remain with you during that time.  It is normal to feel sleepy.  We encourage you to not do anything that requires balance, judgment or coordination.    MILD FLU-LIKE SYMPTOMS ARE NORMAL. YOU MAY EXPERIENCE GENERALIZED MUSCLE ACHES, THROAT IRRITATION, HEADACHE AND/OR SOME NAUSEA.    FOR 24 HOURS DO NOT:  Drive, operate machinery or run household appliances.  Drink beer or alcoholic beverages.   Make important decisions or sign legal documents.    SPECIAL INSTRUCTIONS: follow up with Dr schmidt in 4-6 weeks call set up an appointment [phone]  If you experience chest pain, shortness of breath call 911 return to ER   Resume your home medications  Follow up with your primary care physician as needed    DIET: To avoid nausea, slowly advance diet as tolerated, avoiding spicy or greasy foods for the first day.  Add more substantial food to your diet according to your physician's instructions.  Babies can be fed formula or breast milk as soon as they are hungry.  INCREASE FLUIDS AND FIBER TO AVOID CONSTIPATION.    SURGICAL DRESSING/BATHING: keep dressing clean dry intact    FOLLOW-UP APPOINTMENT:  A follow-up appointment should be arranged with your doctor in [phone]; call to schedule.    You should CALL YOUR PHYSICIAN if you develop:  Fever greater than 101 degrees F.  Pain not relieved by medication, or persistent nausea or vomiting.  Excessive bleeding (blood soaking through dressing) or unexpected drainage from the wound.  Extreme redness or swelling around the incision site, drainage of pus or foul smelling drainage.  Inability to urinate or empty your bladder within 8 hours.  Problems with breathing or chest pain.    You should call 911 if you develop problems with breathing or chest pain.  If you are unable to contact your doctor or surgical center, you should go to the nearest emergency room or urgent care center.   Physician's telephone #: [phone]    If any questions arise, call your doctor.  If your doctor is not available, please feel free to call the Surgical Center at [phone].  The Center is open Monday through Friday from 7AM to 7PM.  You can also call the HEALTH HOTLINE open 24 hours/day, 7 days/week and speak to a nurse at [phone], or toll free at [phone].    A registered nurse may call you a few days after your surgery to see how you are doing after your procedure.    MEDICATIONS: Resume taking daily medication.  Take prescribed pain medication with food.  If no medication is prescribed, you may take non-aspirin pain medication if needed.  PAIN MEDICATION CAN BE VERY CONSTIPATING.  Take a stool softener or laxative such as senokot, pericolace, or milk of magnesia if needed.  Fistulogram, Care After  Refer to this sheet in the next few weeks. These instructions provide you with information on caring for yourself after your procedure. Your health care provider may also give you more specific instructions. Your treatment has been planned according to current medical practices, but problems sometimes occur. Call your health care provider if you have any problems or questions after your procedure.  WHAT TO EXPECT AFTER THE PROCEDURE  After your procedure, it is typical to have the following:  · A small amount of discomfort in the area where the catheters were placed.  · A small amount of bruising around the fistula.  · Sleepiness and fatigue.  HOME CARE INSTRUCTIONS  · Rest at home for the day following your procedure.  · Do not drive or operate heavy machinery while taking pain medicine.  · Take medicines only as directed by your health care provider.  · Do not take baths, swim, or use a hot tub until your health care provider approves. You may shower 24 hours after the procedure or as directed by your health care provider.  · There are many different ways to close and cover an incision, including  stitches, skin glue, and adhesive strips. Follow your health care provider's instructions on:  ¨ Incision care.  ¨ Bandage (dressing) changes and removal.  ¨ Incision closure removal.  · Monitor your dialysis fistula carefully.  SEEK MEDICAL CARE IF:  · You have drainage, redness, swelling, or pain at your catheter site.  · You have a fever.  · You have chills.  SEEK IMMEDIATE MEDICAL CARE IF:  · You feel weak.  · You have trouble balancing.  · You have trouble moving your arms or legs.  · You have problems with your speech or vision.  · You can no longer feel a vibration or buzz when you put your fingers over your dialysis fistula.  · The limb that was used for the procedure:  ¨ Swells.  ¨ Is painful.  ¨ Is cold.  ¨ Is discolored, such as blue or pale white.     This information is not intended to replace advice given to you by your health care provider. Make sure you discuss any questions you have with your health care provider.     Document Released: 05/04/2015 Document Reviewed: 05/04/2015  [employer] Interactive Patient Education ©2016 [employer]      If your physician has prescribed pain medication that includes Acetaminophen (Tylenol), do not take additional Acetaminophen (Tylenol) while taking the prescribed medication.    Depression / Suicide Risk    As you are discharged from this [facility] facility, it is important to learn how to keep safe from harming yourself.    Recognize the warning signs:  · Abrupt changes in personality, positive or negative- including increase in energy   · Giving away possessions  · Change in eating patterns- significant weight changes-  positive or negative  · Change in sleeping patterns- unable to sleep or sleeping all the time   · Unwillingness or inability to communicate  · Depression  · Unusual sadness, discouragement and loneliness  · Talk of wanting to die  · Neglect of personal appearance   · Rebelliousness- reckless behavior  · Withdrawal from people/activities they  love  · Confusion- inability to concentrate     If you or a loved one observes any of these behaviors or has concerns about self-harm, here's what you can do:  · Talk about it- your feelings and reasons for harming yourself  · Remove any means that you might use to hurt yourself (examples: pills, rope, extension cords, firearm)  · Get professional help from the community (Mental Health, Substance Abuse, psychological counseling)  · Do not be alone:Call your Safe Contact- someone whom you trust who will be there for you.  · Call your local CRISIS HOTLINE 425-6725 or 986-462-1425  · Call your local Children's Mobile Crisis Response Team [place] (281) 419-5064 or www.[employer]  · Call the toll free National Suicide Prevention Hotlines   · National Suicide Prevention Lifeline 076-845-JPNW (3943)  · National Hope Line Network 800-SUICIDE (006-7095)

## 2018-05-02 NOTE — OR SURGEON
Immediate Post OP Note    PreOp Diagnosis: Dysfunctional right arm arteriovenous graft    PostOp Diagnosis: Same    Procedure(s):RIGHT ARM ANGIOGRAM/ANGIOPLASTY ARTERIOVENOUS GRAFT    Surgeon(s):AZAR Jansen MD  [facility]    Anesthesiologist/Type of Anesthesia:Shantell Gonzales MD; general    Estimated Blood Loss: minimal, less than 10cc    Findings: persistent outflow stenosis, I wonder how much of this is a real flow phenomenae    Complications: none    5.23mGy  12cc contrast  flouro time 2.5min    [id number]    5/2/2018 11:04 AM Lesly Jansen M.D.

## 2018-05-02 NOTE — PROGRESS NOTES
IR Procedure Note:    Patient consented in PPU by Dr Jansen prior to procedure and all questions answered.  Dr Do provided general anesthesia and monitored patient throughout procedure.  Site marked and confirmed with MD, patient and RN pre procedure.  Dr. Jansen performed right arm angiogram with angioplasty.  The patient tolerated the procedure well.  Gauze and tegaderm applied to right arm fistula access site, CDI and no signs of bleeding; pressure held x 10 minutes.  Patient alert and verbally appropriate post procedure.  RN and Dr Do transported patient to PPU and bedside report given to JOSETTE Villatoro.

## 2018-05-02 NOTE — OR NURSING
1112 Patient arrived from IR s/p right arm angiogram with angioplasty surgical site dressing clean dry intact, patient denies pain, s.o.b, n/v, plan of care discussed with patient agreeable.  1146 patient tolerating oral fluids   1220 criteria met to discharge patient out of ppu  1225 patient escorted via w/c with all her personal belongings.

## 2018-05-08 NOTE — ADDENDUM NOTE
Encounter addended by: Nya Roberts R.N. on: 5/8/2018  8:36 AM<BR>    Actions taken: Flowsheet accepted

## 2018-05-14 RX ORDER — IODIXANOL 270 MG/ML
22 INJECTION, SOLUTION INTRAVASCULAR ONCE
Status: ACTIVE | OUTPATIENT
Start: 2018-04-19 | End: 2018-04-20

## 2018-05-15 RX ORDER — IODIXANOL 270 MG/ML
22 INJECTION, SOLUTION INTRAVASCULAR ONCE
Status: COMPLETED | OUTPATIENT
Start: 2018-04-19 | End: 2018-04-19

## 2018-05-15 NOTE — ADDENDUM NOTE
Encounter addended by: Tanner Heath R.N. on: 5/15/2018 10:55 AM<BR>    Actions taken: Order list changed, MAR administration accepted

## 2018-05-22 ENCOUNTER — PATIENT OUTREACH (OUTPATIENT)
Dept: HEALTH INFORMATION MANAGEMENT | Facility: OTHER | Age: 38
End: 2018-05-22

## 2018-05-29 ENCOUNTER — OFFICE VISIT (OUTPATIENT)
Dept: PHYSICAL MEDICINE AND REHAB | Facility: MEDICAL CENTER | Age: 38
End: 2018-05-29
Payer: MEDICARE

## 2018-05-29 VITALS
HEART RATE: 100 BPM | OXYGEN SATURATION: 92 % | BODY MASS INDEX: 32.32 KG/M2 | DIASTOLIC BLOOD PRESSURE: 98 MMHG | SYSTOLIC BLOOD PRESSURE: 138 MMHG | HEIGHT: 65 IN | WEIGHT: 194 LBS | TEMPERATURE: 100.2 F

## 2018-05-29 DIAGNOSIS — M54.41 MIDLINE LOW BACK PAIN WITH RIGHT-SIDED SCIATICA, UNSPECIFIED CHRONICITY: ICD-10-CM

## 2018-05-29 DIAGNOSIS — Z99.2 ESRD (END STAGE RENAL DISEASE) ON DIALYSIS (HCC): ICD-10-CM

## 2018-05-29 DIAGNOSIS — G62.9 PERIPHERAL POLYNEUROPATHY: ICD-10-CM

## 2018-05-29 DIAGNOSIS — M87.052 AVASCULAR NECROSIS OF BONES OF BOTH HIPS (HCC): ICD-10-CM

## 2018-05-29 DIAGNOSIS — G89.29 OTHER CHRONIC PAIN: ICD-10-CM

## 2018-05-29 DIAGNOSIS — N18.6 ESRD (END STAGE RENAL DISEASE) ON DIALYSIS (HCC): ICD-10-CM

## 2018-05-29 DIAGNOSIS — R10.9 FLANK PAIN: ICD-10-CM

## 2018-05-29 DIAGNOSIS — M87.051 AVASCULAR NECROSIS OF BONES OF BOTH HIPS (HCC): ICD-10-CM

## 2018-05-29 PROCEDURE — 99214 OFFICE O/P EST MOD 30 MIN: CPT | Performed by: PHYSICAL MEDICINE & REHABILITATION

## 2018-05-29 RX ORDER — PROMETHAZINE HYDROCHLORIDE 25 MG/1
TABLET ORAL
Refills: 1 | COMMUNITY
Start: 2018-05-02 | End: 2018-06-07

## 2018-05-29 RX ORDER — OXYCODONE HYDROCHLORIDE 20 MG/1
20 TABLET ORAL EVERY 6 HOURS PRN
Qty: 102 TAB | Refills: 0 | Status: SHIPPED | OUTPATIENT
Start: 2018-05-30 | End: 2018-06-26 | Stop reason: SDUPTHER

## 2018-05-29 ASSESSMENT — PAIN SCALES - GENERAL: PAINLEVEL: 6=MODERATE PAIN

## 2018-05-29 NOTE — PROGRESS NOTES
Follow up patient note  Pain Medicine, Interventional spine and sports physiatry, Physical medicine rehabilitation      Chief complaint:   Cervicalgia, hips and knees, bilateral leg pain      HISTORY      HPI  Patient identification/Interval histotry: Debby Carrizales 37 y.o. female who has chronic pain related to rheumatologic disease, peripheral neuropathy and AVN hips, lupus.  She reports that she has not been getting out of the house much except for dialysis.  The additional oxycodone after dialysis has helped to improve her pain control.  No side effects from medications, mild constipation.  She is going to get some colace.    Her back has been hurting a lot and sometimes it feels like she cannot breath due to back pain.  At times, she holds her breath due to this pain.  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  She reports that she falls occasionally, she does not regularly use her cane.     ROS Red Flags :   Chills, Sweats: Denies, although some sweating on nights after dialysis.  Reports low-grade fever without other symptoms.  Involuntary Weight Loss: Denies  Bowel/Bladder Incontinence: Denies, denies dysuria.  Saddle Anesthesia: Denies    PMHx:   Past Medical History:   Diagnosis Date   • Arthritis     all joints,r/t lupus   • Avascular necrosis of bones of both hips (HCC) 10/10/2016   • Clostridium difficile colitis 5/3/2011   • Dialysis patient    • ESBL (extended spectrum beta-lactamase) producing bacteria infection 8/25/2014   • Fibromyalgia    • Hypertension    • Lupus    • Pneumonia    • Psychiatric disorder     anxiety, depression   • Pyelonephritis 11/21/2017   • Renal failure        PSHx:   Past Surgical History:   Procedure Laterality Date   • AV FISTULA CREATION Right 12/9/2017    Procedure: AV FISTULA CREATION-ARM FOR GRAFT;  Surgeon: Lesly Jansen M.D.;  Location: SURGERY [facility];  Service: General   •  THROMBECTOMY  12/9/2017    Procedure: THROMBECTOMY;  Surgeon: Lesly Jansen M.D.;  Location: SURGERY [facility];  Service: General   • THROMBECTOMY Right 8/21/2016    Procedure: THROMBECTOMY - right AV fistula graft with grams;  Surgeon: Shabbir Ardon M.D.;  Location: SURGERY [facility];  Service:    • ANGIOPLASTY BALLOON  8/21/2016    Procedure: ANGIOPLASTY BALLOON;  Surgeon: Shabbir Ardon M.D.;  Location: SURGERY [facility];  Service:    • THROMBECTOMY Right 8/20/2016    Procedure: THROMBECTOMY AV GRAFT;  Surgeon: Shabbir Ardon M.D.;  Location: SURGERY [facility];  Service:    • AV FISTULA CREATION Right 7/12/2016    Procedure: AV FISTULA CREATION WITH GRAFT BRACHIAL AXILLARY;  Surgeon: Shabbir Ardon M.D.;  Location: SURGERY [facility];  Service:    • CLOSED REDUCTION Right 7/5/2016    Procedure: CLOSED REDUCTION- Hip ;  Surgeon: Michael Holman M.D.;  Location: SURGERY [facility];  Service:    • HIP ARTHROPLASTY TOTAL Right 1/18/2016    Procedure: HIP ARTHROPLASTY TOTAL;  Surgeon: Michael Holman M.D.;  Location: [facility];  Service:    • AV FISTULA CREATION  2/3/2015    Performed by Shabbir Ardon M.D. at [facility]   • AV FISTULA CREATION  11/14/2014    Performed by Shabbir Ardon M.D. at [facility]   • AV FISTULA CREATION  9/9/2014    Performed by Shabbir Adron M.D. at SURGERY [facility]   • CATH PLACEMENT  9/9/2014    Performed by Shabbir Ardon M.D. at [facility]   • OTHER  5/2011    tracheostomy   • GASTROSCOPY-ENDO  4/27/2011    Performed by PALMIRA DOAN at ENDOSCOPY [facility]   • COLONOSCOPY WITH BIOPSY  4/20/2011    Performed by ALCIRA CRUZ at [facility]   • GASTROSCOPY-ENDO  4/18/2011    Performed by ALCIRA CRUZ at ENDOSCOPY [facility]   • GASTROSCOPY-ENDO  4/10/2011    Performed by SYED JURADO at ENDOSCOPY [facility] ORS   •  SCLEROTHERAPHY  4/10/2011    Performed by SYED JURADO at ENDOSCOPY [facility] ORS   • OTHER ABDOMINAL SURGERY      kidney biopsy   • TRACHEOSTOMY         Family history   Denies neuromuscular disease  Family History   Problem Relation Age of Onset   • Heart Disease Mother    • Cancer Father        Medications:   Current Outpatient Prescriptions   Medication   • LYRICA 100 MG Cap   • [START ON 5/30/2018] Oxycodone HCl 20 MG Tab   • cinacalcet (SENSIPAR) 30 MG Tab   • sevelamer carbonate (RENVELA) 800 MG Tab tablet   • lidocaine (LIDODERM) 5 % Patch   • ferrous sulfate 325 (65 FE) MG tablet   • ascorbic acid (ASCORBIC ACID) 500 MG Tab   • buPROPion (WELLBUTRIN XL) 150 MG XL tablet   • hydroxychloroquine (PLAQUENIL) 200 MG Tab   • cholecalciferol (VITAMIN D3) 5000 UNIT Cap   • trazodone (DESYREL) 50 MG Tab   • tizanidine (ZANAFLEX) 4 MG Tab   • promethazine (PHENERGAN) 25 MG Tab   • pregabalin (LYRICA) 150 MG Cap   • hydrOXYzine HCl (ATARAX) 50 MG Tab     No current facility-administered medications for this visit.        Allergies:   Allergies   Allergen Reactions   • Ativan Anxiety     Patient becomes severely paranoid and agitated   • Morphine Itching     Tolerates Dilaudid   • Seasonal Runny Nose and Itching     Hay fever, sabiha brush       Social Hx:   Social History     Social History   • Marital status: Single     Spouse name: N/A   • Number of children: N/A   • Years of education: N/A     Occupational History   • Not on file.     Social History Main Topics   • Smoking status: Former Smoker     Packs/day: 0.50     Years: 18.00     Types: Cigarettes     Quit date: 6/13/2011   • Smokeless tobacco: Never Used      Comment: 1/2 ppd   • Alcohol use No      Comment: occ   • Drug use: No      Comment: occ   • Sexual activity: Not on file     Other Topics Concern   •  Service No   • Blood Transfusions Yes   • Caffeine Concern No   • Occupational Exposure No   • Hobby Hazards No   • Sleep Concern Yes   •  "Stress Concern Yes   • Weight Concern Yes   • Special Diet Yes   • Back Care No   • Exercise Yes   • Bike Helmet No   • Seat Belt Yes   • Self-Exams Yes     Social History Narrative   • No narrative on file       EXAMINATION     Physical Exam:   Vitals: Blood pressure 138/98, pulse 100, temperature 37.9 °C (100.2 °F), height 1.651 m (5' 5\"), weight 88 kg (194 lb), SpO2 92 %.    Constitutional:   Body Habitus: Body mass index is 32.28 kg/m².  Cooperation: Fully cooperates with exam  Appearance: Well-groomed no disheveled, in no acute distress    Respiratory-  breathing comfortable on room air, no audible wheezing  Cardiovascular- capillary refills less than 2 seconds. No lower extremity edema is noted.   Psychiatric- alert and oriented ×3. Normal affect.   Spine:  No focal motor deficits in the lower extremities bilaterally.  Sensation is intact to light touch bilaterally.  Reflexes are 2+ patella and 1+ achilles bilaterally.  Bilateral flank pain, left greater than right.  No paraspinal tenderness.      MEDICAL DECISION MAKING    DATA    Labs:        Lab Results   Component Value Date/Time    HBA1C 5.3 04/18/2018 03:50 AM          Imaging: I personally reviewed following images    I reviewed the following radiology reports                    Results for orders placed during the hospital encounter of 07/19/17   MR-LUMBAR SPINE-W/O    Impression 1.  Diffuse decreased bone marrow signal intensity throughout the lumbar spine and sacrum, presumably secondary to chronic anemia.    2.  Otherwise unremarkable MRI scan of the lumbar spine without contrast.                                    DIAGNOSIS   Visit Diagnoses     ICD-10-CM   1. Peripheral polyneuropathy (HCC) G62.9   2. Other chronic pain G89.29   3. Midline low back pain with right-sided sciatica, unspecified chronicity M54.41   4. Flank pain R10.9   5. ESRD (end stage renal disease) on dialysis (HCC) N18.6    Z99.2         ASSESSMENT and PLAN:     Debby Mustafa" All 37 y.o. Female returns for follow-up of her chronic pain related to lupus, AVN hips, low back and peripheral neuropathy    Debby was seen today for follow-up.    Diagnoses and all orders for this visit:    Peripheral polyneuropathy (HCC)  -     Oxycodone HCl 20 MG Tab; Take 1 Tab by mouth every 6 hours as needed (Take up to three a day as needed and one additional pill after dialysis (3 times a week)) for up to 30 days.    Other chronic pain  -     Oxycodone HCl 20 MG Tab; Take 1 Tab by mouth every 6 hours as needed (Take up to three a day as needed and one additional pill after dialysis (3 times a week)) for up to 30 days.    Midline low back pain with right-sided sciatica, unspecified chronicity    Flank pain    ESRD (end stage renal disease) on dialysis (HCC)  -     Oxycodone HCl 20 MG Tab; Take 1 Tab by mouth every 6 hours as needed (Take up to three a day as needed and one additional pill after dialysis (3 times a week)) for up to 30 days.    We discussed that she has done well with the current medication routine and finds taking one extra dose after dialysis on those days has helped a lot.  No change to medication routine.    She does have a low-grade temperature today with flank pain.  Given the fact that she had pyelonephritis late last fall with two hospitalizations, I have asked her to visit with her nephrologist.  She should be able to do that tomorrow at [facility].  She agrees that she will check her temperature again at home and seek urgent or ER care if her temperature goes above 101 or she develops new or worsening symptoms.    Follow up: 1 month    Thank you for allowing me to participate in the care of this patient. If you have any questions please not hesitate to contact me.      Please note that this dictation was created using voice recognition software. I have made every reasonable attempt to correct obvious errors but there may be errors of grammar and content that I may have overlooked  prior to finalization of this note.      Quinn Chandler MD  Interventional Spine and Sports Physiatry  Physical Medicine and Rehabilitation  [facility]  5/29/2018 11:04 AM

## 2018-06-01 NOTE — PROGRESS NOTES
Debby Carrizales was an emergent admission who discharged on 4/22, Patient advocate was able to speak with the patient to confirm that they were following up with their providers as well as taking their medications as directed. Patient Advocate encouraged the patient to see their PCP but the patient declined assistance with scheduling. Patient kept her appointment with Psychiatry on 5/1 and has a follow up 5/29. PPS 70%

## 2018-06-06 PROCEDURE — 99285 EMERGENCY DEPT VISIT HI MDM: CPT

## 2018-06-07 ENCOUNTER — APPOINTMENT (OUTPATIENT)
Dept: RADIOLOGY | Facility: MEDICAL CENTER | Age: 38
DRG: 871 | End: 2018-06-07
Attending: EMERGENCY MEDICINE
Payer: MEDICARE

## 2018-06-07 ENCOUNTER — HOSPITAL ENCOUNTER (INPATIENT)
Facility: MEDICAL CENTER | Age: 38
LOS: 2 days | DRG: 871 | End: 2018-06-09
Attending: EMERGENCY MEDICINE | Admitting: HOSPITALIST
Payer: MEDICARE

## 2018-06-07 DIAGNOSIS — N19 UREMIA: ICD-10-CM

## 2018-06-07 PROBLEM — E87.20 ACIDOSIS: Status: ACTIVE | Noted: 2018-06-07

## 2018-06-07 PROBLEM — J18.9 SEPSIS DUE TO PNEUMONIA (HCC): Status: ACTIVE | Noted: 2018-06-07

## 2018-06-07 PROBLEM — A41.9 SEPSIS DUE TO PNEUMONIA (HCC): Status: ACTIVE | Noted: 2018-06-07

## 2018-06-07 LAB
ALBUMIN SERPL BCP-MCNC: 3.9 G/DL (ref 3.2–4.9)
ALBUMIN/GLOB SERPL: 1.1 G/DL
ALP SERPL-CCNC: 105 U/L (ref 30–99)
ALT SERPL-CCNC: 15 U/L (ref 2–50)
ANION GAP SERPL CALC-SCNC: 18 MMOL/L (ref 0–11.9)
ANION GAP SERPL CALC-SCNC: 21 MMOL/L (ref 0–11.9)
APPEARANCE UR: CLEAR
APTT PPP: 75.4 SEC (ref 24.7–36)
AST SERPL-CCNC: 14 U/L (ref 12–45)
BACTERIA #/AREA URNS HPF: ABNORMAL /HPF
BASE EXCESS BLDV CALC-SCNC: -15 MMOL/L
BASOPHILS # BLD AUTO: 0.4 % (ref 0–1.8)
BASOPHILS # BLD: 0.04 K/UL (ref 0–0.12)
BILIRUB SERPL-MCNC: 0.6 MG/DL (ref 0.1–1.5)
BILIRUB UR QL STRIP.AUTO: NEGATIVE
BODY TEMPERATURE: ABNORMAL CENTIGRADE
BUN SERPL-MCNC: 111 MG/DL (ref 8–22)
BUN SERPL-MCNC: 48 MG/DL (ref 8–22)
CALCIUM SERPL-MCNC: 8.2 MG/DL (ref 8.5–10.5)
CALCIUM SERPL-MCNC: 9 MG/DL (ref 8.5–10.5)
CHLORIDE SERPL-SCNC: 106 MMOL/L (ref 96–112)
CHLORIDE SERPL-SCNC: 96 MMOL/L (ref 96–112)
CO2 SERPL-SCNC: 22 MMOL/L (ref 20–33)
CO2 SERPL-SCNC: 9 MMOL/L (ref 20–33)
COLOR UR: YELLOW
CREAT SERPL-MCNC: 18.57 MG/DL (ref 0.5–1.4)
CREAT SERPL-MCNC: 9.55 MG/DL (ref 0.5–1.4)
EKG IMPRESSION: NORMAL
EOSINOPHIL # BLD AUTO: 0.15 K/UL (ref 0–0.51)
EOSINOPHIL NFR BLD: 1.6 % (ref 0–6.9)
EPI CELLS #/AREA URNS HPF: ABNORMAL /HPF
ERYTHROCYTE [DISTWIDTH] IN BLOOD BY AUTOMATED COUNT: 46.1 FL (ref 35.9–50)
EST. AVERAGE GLUCOSE BLD GHB EST-MCNC: 94 MG/DL
FLUAV RNA SPEC QL NAA+PROBE: NEGATIVE
FLUBV RNA SPEC QL NAA+PROBE: NEGATIVE
GLOBULIN SER CALC-MCNC: 3.6 G/DL (ref 1.9–3.5)
GLUCOSE SERPL-MCNC: 90 MG/DL (ref 65–99)
GLUCOSE SERPL-MCNC: 91 MG/DL (ref 65–99)
GLUCOSE UR STRIP.AUTO-MCNC: 250 MG/DL
HAV IGM SERPL QL IA: NEGATIVE
HBA1C MFR BLD: 4.9 % (ref 0–5.6)
HBV CORE IGM SER QL: NEGATIVE
HBV SURFACE AB SERPL IA-ACNC: <3.1 MIU/ML (ref 0–10)
HBV SURFACE AG SER QL: NEGATIVE
HCO3 BLDV-SCNC: 13 MMOL/L (ref 24–28)
HCT VFR BLD AUTO: 28.7 % (ref 37–47)
HCV AB SER QL: NEGATIVE
HGB BLD-MCNC: 9.5 G/DL (ref 12–16)
HYALINE CASTS #/AREA URNS LPF: ABNORMAL /LPF
IMM GRANULOCYTES # BLD AUTO: 0.06 K/UL (ref 0–0.11)
IMM GRANULOCYTES NFR BLD AUTO: 0.6 % (ref 0–0.9)
INR PPP: 1.35 (ref 0.87–1.13)
KETONES UR STRIP.AUTO-MCNC: NEGATIVE MG/DL
LACTATE BLD-SCNC: 0.7 MMOL/L (ref 0.5–2)
LACTATE BLD-SCNC: 0.9 MMOL/L (ref 0.5–2)
LACTATE BLD-SCNC: 0.9 MMOL/L (ref 0.5–2)
LEUKOCYTE ESTERASE UR QL STRIP.AUTO: ABNORMAL
LYMPHOCYTES # BLD AUTO: 1.33 K/UL (ref 1–4.8)
LYMPHOCYTES NFR BLD: 13.9 % (ref 22–41)
MCH RBC QN AUTO: 31.3 PG (ref 27–33)
MCHC RBC AUTO-ENTMCNC: 33.1 G/DL (ref 33.6–35)
MCV RBC AUTO: 94.4 FL (ref 81.4–97.8)
MICRO URNS: ABNORMAL
MONOCYTES # BLD AUTO: 0.78 K/UL (ref 0–0.85)
MONOCYTES NFR BLD AUTO: 8.2 % (ref 0–13.4)
NEUTROPHILS # BLD AUTO: 7.19 K/UL (ref 2–7.15)
NEUTROPHILS NFR BLD: 75.3 % (ref 44–72)
NITRITE UR QL STRIP.AUTO: NEGATIVE
NRBC # BLD AUTO: 0 K/UL
NRBC BLD-RTO: 0 /100 WBC
PCO2 BLDV: 39.4 MMHG (ref 41–51)
PH BLDV: 7.14 [PH] (ref 7.31–7.45)
PH UR STRIP.AUTO: 8 [PH]
PLATELET # BLD AUTO: 105 K/UL (ref 164–446)
PMV BLD AUTO: 10.3 FL (ref 9–12.9)
PO2 BLDV: 21.5 MMHG (ref 25–40)
POTASSIUM SERPL-SCNC: 3.1 MMOL/L (ref 3.6–5.5)
POTASSIUM SERPL-SCNC: 4.1 MMOL/L (ref 3.6–5.5)
PROCALCITONIN SERPL-MCNC: 0.96 NG/ML
PROT SERPL-MCNC: 7.5 G/DL (ref 6–8.2)
PROT UR QL STRIP: 300 MG/DL
PROTHROMBIN TIME: 16.4 SEC (ref 12–14.6)
RBC # BLD AUTO: 3.04 M/UL (ref 4.2–5.4)
RBC # URNS HPF: ABNORMAL /HPF
RBC UR QL AUTO: ABNORMAL
SAO2 % BLDV: 28.7 %
SODIUM SERPL-SCNC: 136 MMOL/L (ref 135–145)
SODIUM SERPL-SCNC: 136 MMOL/L (ref 135–145)
SP GR UR STRIP.AUTO: 1.01
TSH SERPL DL<=0.005 MIU/L-ACNC: 0.92 UIU/ML (ref 0.38–5.33)
UROBILINOGEN UR STRIP.AUTO-MCNC: 0.2 MG/DL
WBC # BLD AUTO: 9.6 K/UL (ref 4.8–10.8)
WBC #/AREA URNS HPF: ABNORMAL /HPF

## 2018-06-07 PROCEDURE — 86706 HEP B SURFACE ANTIBODY: CPT

## 2018-06-07 PROCEDURE — 90935 HEMODIALYSIS ONE EVALUATION: CPT

## 2018-06-07 PROCEDURE — A9270 NON-COVERED ITEM OR SERVICE: HCPCS | Performed by: HOSPITALIST

## 2018-06-07 PROCEDURE — 700111 HCHG RX REV CODE 636 W/ 250 OVERRIDE (IP): Performed by: EMERGENCY MEDICINE

## 2018-06-07 PROCEDURE — 81001 URINALYSIS AUTO W/SCOPE: CPT

## 2018-06-07 PROCEDURE — 80074 ACUTE HEPATITIS PANEL: CPT

## 2018-06-07 PROCEDURE — 84443 ASSAY THYROID STIM HORMONE: CPT

## 2018-06-07 PROCEDURE — 86480 TB TEST CELL IMMUN MEASURE: CPT

## 2018-06-07 PROCEDURE — 5A1D70Z PERFORMANCE OF URINARY FILTRATION, INTERMITTENT, LESS THAN 6 HOURS PER DAY: ICD-10-PCS | Performed by: INTERNAL MEDICINE

## 2018-06-07 PROCEDURE — 87086 URINE CULTURE/COLONY COUNT: CPT

## 2018-06-07 PROCEDURE — A9270 NON-COVERED ITEM OR SERVICE: HCPCS | Performed by: EMERGENCY MEDICINE

## 2018-06-07 PROCEDURE — 96375 TX/PRO/DX INJ NEW DRUG ADDON: CPT

## 2018-06-07 PROCEDURE — 71045 X-RAY EXAM CHEST 1 VIEW: CPT

## 2018-06-07 PROCEDURE — 99221 1ST HOSP IP/OBS SF/LOW 40: CPT | Performed by: INTERNAL MEDICINE

## 2018-06-07 PROCEDURE — 85610 PROTHROMBIN TIME: CPT

## 2018-06-07 PROCEDURE — 700102 HCHG RX REV CODE 250 W/ 637 OVERRIDE(OP): Performed by: HOSPITALIST

## 2018-06-07 PROCEDURE — 700105 HCHG RX REV CODE 258: Performed by: EMERGENCY MEDICINE

## 2018-06-07 PROCEDURE — 770006 HCHG ROOM/CARE - MED/SURG/GYN SEMI*

## 2018-06-07 PROCEDURE — 99223 1ST HOSP IP/OBS HIGH 75: CPT | Mod: AI | Performed by: HOSPITALIST

## 2018-06-07 PROCEDURE — 87502 INFLUENZA DNA AMP PROBE: CPT

## 2018-06-07 PROCEDURE — 80048 BASIC METABOLIC PNL TOTAL CA: CPT

## 2018-06-07 PROCEDURE — 80053 COMPREHEN METABOLIC PANEL: CPT

## 2018-06-07 PROCEDURE — 84145 PROCALCITONIN (PCT): CPT

## 2018-06-07 PROCEDURE — 85730 THROMBOPLASTIN TIME PARTIAL: CPT

## 2018-06-07 PROCEDURE — 700105 HCHG RX REV CODE 258: Performed by: HOSPITALIST

## 2018-06-07 PROCEDURE — 83605 ASSAY OF LACTIC ACID: CPT

## 2018-06-07 PROCEDURE — 700102 HCHG RX REV CODE 250 W/ 637 OVERRIDE(OP): Performed by: EMERGENCY MEDICINE

## 2018-06-07 PROCEDURE — 93005 ELECTROCARDIOGRAM TRACING: CPT | Performed by: EMERGENCY MEDICINE

## 2018-06-07 PROCEDURE — 96366 THER/PROPH/DIAG IV INF ADDON: CPT

## 2018-06-07 PROCEDURE — 36415 COLL VENOUS BLD VENIPUNCTURE: CPT

## 2018-06-07 PROCEDURE — 85025 COMPLETE CBC W/AUTO DIFF WBC: CPT

## 2018-06-07 PROCEDURE — 96365 THER/PROPH/DIAG IV INF INIT: CPT

## 2018-06-07 PROCEDURE — 700111 HCHG RX REV CODE 636 W/ 250 OVERRIDE (IP): Performed by: INTERNAL MEDICINE

## 2018-06-07 PROCEDURE — 83036 HEMOGLOBIN GLYCOSYLATED A1C: CPT

## 2018-06-07 PROCEDURE — 94760 N-INVAS EAR/PLS OXIMETRY 1: CPT

## 2018-06-07 PROCEDURE — 87040 BLOOD CULTURE FOR BACTERIA: CPT

## 2018-06-07 PROCEDURE — 93005 ELECTROCARDIOGRAM TRACING: CPT

## 2018-06-07 PROCEDURE — 82803 BLOOD GASES ANY COMBINATION: CPT

## 2018-06-07 PROCEDURE — 700111 HCHG RX REV CODE 636 W/ 250 OVERRIDE (IP): Performed by: HOSPITALIST

## 2018-06-07 PROCEDURE — 700105 HCHG RX REV CODE 258

## 2018-06-07 RX ORDER — PREGABALIN 75 MG/1
150 CAPSULE ORAL 3 TIMES DAILY
Status: DISCONTINUED | OUTPATIENT
Start: 2018-06-07 | End: 2018-06-09 | Stop reason: HOSPADM

## 2018-06-07 RX ORDER — TRAZODONE HYDROCHLORIDE 50 MG/1
50 TABLET ORAL
Status: DISCONTINUED | OUTPATIENT
Start: 2018-06-07 | End: 2018-06-09 | Stop reason: HOSPADM

## 2018-06-07 RX ORDER — BUPROPION HYDROCHLORIDE 150 MG/1
150 TABLET ORAL EVERY MORNING
Status: DISCONTINUED | OUTPATIENT
Start: 2018-06-07 | End: 2018-06-07

## 2018-06-07 RX ORDER — TIZANIDINE 4 MG/1
8 TABLET ORAL
Status: DISCONTINUED | OUTPATIENT
Start: 2018-06-07 | End: 2018-06-09 | Stop reason: HOSPADM

## 2018-06-07 RX ORDER — SODIUM CHLORIDE 9 MG/ML
INJECTION, SOLUTION INTRAVENOUS
Status: COMPLETED
Start: 2018-06-07 | End: 2018-06-07

## 2018-06-07 RX ORDER — HYDROXYZINE HYDROCHLORIDE 25 MG/1
50 TABLET, FILM COATED ORAL 3 TIMES DAILY PRN
Status: DISCONTINUED | OUTPATIENT
Start: 2018-06-07 | End: 2018-06-09 | Stop reason: HOSPADM

## 2018-06-07 RX ORDER — SEVELAMER CARBONATE 800 MG/1
2400 TABLET, FILM COATED ORAL
Status: DISCONTINUED | OUTPATIENT
Start: 2018-06-07 | End: 2018-06-09 | Stop reason: HOSPADM

## 2018-06-07 RX ORDER — ACETAMINOPHEN 325 MG/1
650 TABLET ORAL EVERY 4 HOURS PRN
Status: DISCONTINUED | OUTPATIENT
Start: 2018-06-07 | End: 2018-06-09 | Stop reason: HOSPADM

## 2018-06-07 RX ORDER — CLONIDINE HYDROCHLORIDE 0.1 MG/1
0.1 TABLET ORAL EVERY 6 HOURS PRN
Status: DISCONTINUED | OUTPATIENT
Start: 2018-06-07 | End: 2018-06-09 | Stop reason: HOSPADM

## 2018-06-07 RX ORDER — AZITHROMYCIN 250 MG/1
500 TABLET, FILM COATED ORAL EVERY 24 HOURS
Status: DISCONTINUED | OUTPATIENT
Start: 2018-06-08 | End: 2018-06-08

## 2018-06-07 RX ORDER — ONDANSETRON 2 MG/ML
4 INJECTION INTRAMUSCULAR; INTRAVENOUS ONCE
Status: COMPLETED | OUTPATIENT
Start: 2018-06-07 | End: 2018-06-07

## 2018-06-07 RX ORDER — SODIUM CHLORIDE 9 MG/ML
500 INJECTION, SOLUTION INTRAVENOUS
Status: DISCONTINUED | OUTPATIENT
Start: 2018-06-07 | End: 2018-06-09 | Stop reason: HOSPADM

## 2018-06-07 RX ORDER — FERROUS SULFATE 325(65) MG
325 TABLET ORAL DAILY
Status: DISCONTINUED | OUTPATIENT
Start: 2018-06-07 | End: 2018-06-09 | Stop reason: HOSPADM

## 2018-06-07 RX ORDER — SODIUM CHLORIDE 9 MG/ML
1000 INJECTION, SOLUTION INTRAVENOUS ONCE
Status: ACTIVE | OUTPATIENT
Start: 2018-06-07 | End: 2018-06-08

## 2018-06-07 RX ORDER — OXYCODONE HYDROCHLORIDE 10 MG/1
20 TABLET ORAL 3 TIMES DAILY PRN
Status: DISCONTINUED | OUTPATIENT
Start: 2018-06-07 | End: 2018-06-09 | Stop reason: HOSPADM

## 2018-06-07 RX ORDER — CINACALCET 30 MG/1
30 TABLET, FILM COATED ORAL
Status: DISCONTINUED | OUTPATIENT
Start: 2018-06-07 | End: 2018-06-09 | Stop reason: HOSPADM

## 2018-06-07 RX ORDER — PROMETHAZINE HYDROCHLORIDE 25 MG/1
25 TABLET ORAL EVERY 6 HOURS PRN
COMMUNITY
End: 2018-12-04

## 2018-06-07 RX ORDER — ONDANSETRON 2 MG/ML
4 INJECTION INTRAMUSCULAR; INTRAVENOUS EVERY 4 HOURS PRN
Status: DISCONTINUED | OUTPATIENT
Start: 2018-06-07 | End: 2018-06-09 | Stop reason: HOSPADM

## 2018-06-07 RX ORDER — HEPARIN SODIUM 1000 [USP'U]/ML
1500 INJECTION, SOLUTION INTRAVENOUS; SUBCUTANEOUS PRN
Status: DISCONTINUED | OUTPATIENT
Start: 2018-06-07 | End: 2018-06-09 | Stop reason: HOSPADM

## 2018-06-07 RX ORDER — BISACODYL 10 MG
10 SUPPOSITORY, RECTAL RECTAL
Status: DISCONTINUED | OUTPATIENT
Start: 2018-06-07 | End: 2018-06-09 | Stop reason: HOSPADM

## 2018-06-07 RX ORDER — HYDROMORPHONE HYDROCHLORIDE 2 MG/ML
0.5 INJECTION, SOLUTION INTRAMUSCULAR; INTRAVENOUS; SUBCUTANEOUS ONCE
Status: COMPLETED | OUTPATIENT
Start: 2018-06-07 | End: 2018-06-07

## 2018-06-07 RX ORDER — PREGABALIN 100 MG/1
100 CAPSULE ORAL 3 TIMES DAILY
Status: ON HOLD | COMMUNITY
End: 2018-12-08

## 2018-06-07 RX ORDER — POLYETHYLENE GLYCOL 3350 17 G/17G
1 POWDER, FOR SOLUTION ORAL
Status: DISCONTINUED | OUTPATIENT
Start: 2018-06-07 | End: 2018-06-09 | Stop reason: HOSPADM

## 2018-06-07 RX ORDER — BUPROPION HYDROCHLORIDE 150 MG/1
150 TABLET, EXTENDED RELEASE ORAL DAILY
Status: DISCONTINUED | OUTPATIENT
Start: 2018-06-07 | End: 2018-06-09 | Stop reason: HOSPADM

## 2018-06-07 RX ORDER — AMOXICILLIN 250 MG
2 CAPSULE ORAL 2 TIMES DAILY
Status: DISCONTINUED | OUTPATIENT
Start: 2018-06-07 | End: 2018-06-09 | Stop reason: HOSPADM

## 2018-06-07 RX ORDER — HEPARIN SODIUM 5000 [USP'U]/ML
5000 INJECTION, SOLUTION INTRAVENOUS; SUBCUTANEOUS EVERY 8 HOURS
Status: DISCONTINUED | OUTPATIENT
Start: 2018-06-07 | End: 2018-06-09 | Stop reason: HOSPADM

## 2018-06-07 RX ORDER — PROMETHAZINE HYDROCHLORIDE 25 MG/1
12.5-25 SUPPOSITORY RECTAL EVERY 4 HOURS PRN
Status: DISCONTINUED | OUTPATIENT
Start: 2018-06-07 | End: 2018-06-09 | Stop reason: HOSPADM

## 2018-06-07 RX ORDER — AZITHROMYCIN 250 MG/1
500 TABLET, FILM COATED ORAL ONCE
Status: COMPLETED | OUTPATIENT
Start: 2018-06-07 | End: 2018-06-07

## 2018-06-07 RX ORDER — PROMETHAZINE HYDROCHLORIDE 25 MG/1
12.5-25 TABLET ORAL EVERY 4 HOURS PRN
Status: DISCONTINUED | OUTPATIENT
Start: 2018-06-07 | End: 2018-06-09 | Stop reason: HOSPADM

## 2018-06-07 RX ORDER — HEPARIN SODIUM 1000 [USP'U]/ML
INJECTION, SOLUTION INTRAVENOUS; SUBCUTANEOUS
Status: DISPENSED
Start: 2018-06-07 | End: 2018-06-07

## 2018-06-07 RX ORDER — HYDROXYCHLOROQUINE SULFATE 200 MG/1
200 TABLET, FILM COATED ORAL
Status: DISCONTINUED | OUTPATIENT
Start: 2018-06-07 | End: 2018-06-09 | Stop reason: HOSPADM

## 2018-06-07 RX ORDER — CEFDINIR 300 MG/1
300 CAPSULE ORAL ONCE
Status: COMPLETED | OUTPATIENT
Start: 2018-06-07 | End: 2018-06-07

## 2018-06-07 RX ORDER — ONDANSETRON 4 MG/1
4 TABLET, ORALLY DISINTEGRATING ORAL EVERY 4 HOURS PRN
Status: DISCONTINUED | OUTPATIENT
Start: 2018-06-07 | End: 2018-06-09 | Stop reason: HOSPADM

## 2018-06-07 RX ADMIN — PROMETHAZINE HYDROCHLORIDE 25 MG: 25 TABLET ORAL at 05:07

## 2018-06-07 RX ADMIN — TIZANIDINE 8 MG: 4 TABLET ORAL at 22:45

## 2018-06-07 RX ADMIN — HEPARIN SODIUM 5000 UNITS: 5000 INJECTION, SOLUTION INTRAVENOUS; SUBCUTANEOUS at 14:29

## 2018-06-07 RX ADMIN — HEPARIN SODIUM 1500 UNITS: 1000 INJECTION, SOLUTION INTRAVENOUS; SUBCUTANEOUS at 09:55

## 2018-06-07 RX ADMIN — CINACALCET HYDROCHLORIDE 30 MG: 30 TABLET, COATED ORAL at 22:44

## 2018-06-07 RX ADMIN — SODIUM BICARBONATE 150 MEQ: 84 INJECTION, SOLUTION INTRAVENOUS at 05:52

## 2018-06-07 RX ADMIN — ACETAMINOPHEN 650 MG: 325 TABLET, FILM COATED ORAL at 18:37

## 2018-06-07 RX ADMIN — CEFTRIAXONE 2 G: 2 INJECTION, POWDER, FOR SOLUTION INTRAMUSCULAR; INTRAVENOUS at 05:07

## 2018-06-07 RX ADMIN — PREGABALIN 150 MG: 75 CAPSULE ORAL at 22:44

## 2018-06-07 RX ADMIN — OXYCODONE HYDROCHLORIDE 20 MG: 10 TABLET ORAL at 14:27

## 2018-06-07 RX ADMIN — HYDROXYCHLOROQUINE SULFATE 200 MG: 200 TABLET, FILM COATED ORAL at 22:44

## 2018-06-07 RX ADMIN — SODIUM CHLORIDE 500 ML: 9 INJECTION, SOLUTION INTRAVENOUS at 19:30

## 2018-06-07 RX ADMIN — STANDARDIZED SENNA CONCENTRATE AND DOCUSATE SODIUM 2 TABLET: 8.6; 5 TABLET, FILM COATED ORAL at 22:45

## 2018-06-07 RX ADMIN — HYDROMORPHONE HYDROCHLORIDE 0.5 MG: 2 INJECTION INTRAMUSCULAR; INTRAVENOUS; SUBCUTANEOUS at 03:11

## 2018-06-07 RX ADMIN — PREGABALIN 150 MG: 75 CAPSULE ORAL at 14:27

## 2018-06-07 RX ADMIN — OXYCODONE HYDROCHLORIDE 20 MG: 10 TABLET ORAL at 05:52

## 2018-06-07 RX ADMIN — CEFDINIR 300 MG: 300 CAPSULE ORAL at 04:20

## 2018-06-07 RX ADMIN — ONDANSETRON 4 MG: 2 INJECTION INTRAMUSCULAR; INTRAVENOUS at 03:11

## 2018-06-07 RX ADMIN — AZITHROMYCIN 500 MG: 250 TABLET, FILM COATED ORAL at 04:20

## 2018-06-07 RX ADMIN — SEVELAMER CARBONATE 2400 MG: 800 TABLET, FILM COATED ORAL at 22:45

## 2018-06-07 RX ADMIN — HEPARIN SODIUM 5000 UNITS: 5000 INJECTION, SOLUTION INTRAVENOUS; SUBCUTANEOUS at 22:44

## 2018-06-07 ASSESSMENT — ENCOUNTER SYMPTOMS
DEPRESSION: 0
PALPITATIONS: 0
HEARTBURN: 0
FOCAL WEAKNESS: 0
EYE DISCHARGE: 0
HEMOPTYSIS: 0
ABDOMINAL PAIN: 0
NAUSEA: 1
VOMITING: 0
WEAKNESS: 1
SENSORY CHANGE: 0
FLANK PAIN: 0
BLURRED VISION: 0
HALLUCINATIONS: 0
DIZZINESS: 0
SHORTNESS OF BREATH: 0
DOUBLE VISION: 0
FEVER: 0
CHILLS: 0
SPUTUM PRODUCTION: 1
MYALGIAS: 0
SPEECH CHANGE: 0
BRUISES/BLEEDS EASILY: 0
COUGH: 1

## 2018-06-07 ASSESSMENT — COPD QUESTIONNAIRES
DURING THE PAST 4 WEEKS HOW MUCH DID YOU FEEL SHORT OF BREATH: SOME OF THE TIME
IN THE PAST 12 MONTHS DO YOU DO LESS THAN YOU USED TO BECAUSE OF YOUR BREATHING PROBLEMS: DISAGREE/UNSURE
COPD SCREENING SCORE: 4
DO YOU EVER COUGH UP ANY MUCUS OR PHLEGM?: YES, A FEW DAYS A WEEK OR MONTH
DURING THE PAST 4 WEEKS HOW MUCH DID YOU FEEL SHORT OF BREATH: SOME OF THE TIME
HAVE YOU SMOKED AT LEAST 100 CIGARETTES IN YOUR ENTIRE LIFE: YES
DO YOU EVER COUGH UP ANY MUCUS OR PHLEGM?: YES, A FEW DAYS A WEEK OR MONTH
HAVE YOU SMOKED AT LEAST 100 CIGARETTES IN YOUR ENTIRE LIFE: YES
COPD SCREENING SCORE: 4

## 2018-06-07 ASSESSMENT — PATIENT HEALTH QUESTIONNAIRE - PHQ9
1. LITTLE INTEREST OR PLEASURE IN DOING THINGS: NOT AT ALL
3. TROUBLE FALLING OR STAYING ASLEEP OR SLEEPING TOO MUCH: NEARLY EVERY DAY
2. FEELING DOWN, DEPRESSED, IRRITABLE, OR HOPELESS: NEARLY EVERY DAY
SUM OF ALL RESPONSES TO PHQ QUESTIONS 1-9: 18
SUM OF ALL RESPONSES TO PHQ9 QUESTIONS 1 AND 2: 3
7. TROUBLE CONCENTRATING ON THINGS, SUCH AS READING THE NEWSPAPER OR WATCHING TELEVISION: SEVERAL DAYS
6. FEELING BAD ABOUT YOURSELF - OR THAT YOU ARE A FAILURE OR HAVE LET YOURSELF OR YOUR FAMILY DOWN: MORE THAN HALF THE DAYS
9. THOUGHTS THAT YOU WOULD BE BETTER OFF DEAD, OR OF HURTING YOURSELF: SEVERAL DAYS
5. POOR APPETITE OR OVEREATING: NEARLY EVERY DAY
8. MOVING OR SPEAKING SO SLOWLY THAT OTHER PEOPLE COULD HAVE NOTICED. OR THE OPPOSITE, BEING SO FIGETY OR RESTLESS THAT YOU HAVE BEEN MOVING AROUND A LOT MORE THAN USUAL: MORE THAN HALF THE DAYS
4. FEELING TIRED OR HAVING LITTLE ENERGY: NEARLY EVERY DAY

## 2018-06-07 ASSESSMENT — LIFESTYLE VARIABLES
ALCOHOL_USE: NO
EVER_SMOKED: YES
SUBSTANCE_ABUSE: 0

## 2018-06-07 ASSESSMENT — PAIN SCALES - GENERAL
PAINLEVEL_OUTOF10: 7
PAINLEVEL_OUTOF10: 5
PAINLEVEL_OUTOF10: 4
PAINLEVEL_OUTOF10: 8
PAINLEVEL_OUTOF10: 8

## 2018-06-07 NOTE — ASSESSMENT & PLAN NOTE
This is sepsis (without associated acute organ dysfunction).  Due to pneumonia.  Tachycardia and tachypnea, pro calcitonin mildly elevated.   Patient is immunocompromised on plaquenil  IV abx -- change to c3 , doxy for extended HCAP coverage.  Follow cultures

## 2018-06-07 NOTE — ED NOTES
Lab called for a second time regarding CMP, they state the labs will result in 3 min. Pt given warm blankets, needs met.

## 2018-06-07 NOTE — ED TRIAGE NOTES
Debby Carrizales  37 y.o.  Chief Complaint   Patient presents with   • Cold Symptoms     x 1 week   • Other     missed dialysis x 1 week, normal HD M-W-F     Patient to triage via wheelchair for above. States that she has been feeling so poorly that she has been unable to go to dialysis. Last dialysis session 6/1/18. Also complaining of acute on chronic bilateral hip pain 8/10.    Hx. lupus    Charge JOSETTE Mercedes notified of patient.    EKG ordered in triage per protocol.

## 2018-06-07 NOTE — CARE PLAN
Problem: Venous Thromboembolism (VTW)/Deep Vein Thrombosis (DVT) Prevention:  Goal: Patient will participate in Venous Thrombosis (VTE)/Deep Vein Thrombosis (DVT)Prevention Measures  Outcome: PROGRESSING AS EXPECTED  Pt refusing SCD's   Heparin subQ ordered and administered per MAR    Problem: Knowledge Deficit  Goal: Knowledge of disease process/condition, treatment plan, diagnostic tests, and medications will improve  Outcome: PROGRESSING AS EXPECTED  Discussed POC upon arrival to unit.

## 2018-06-07 NOTE — ED PROVIDER NOTES
ED Provider Note    ED Provider Note      Primary care provider: Sushila Manzano M.D.    CHIEF COMPLAINT  Chief Complaint   Patient presents with   • Cold Symptoms     x 1 week   • Other     missed dialysis x 1 week, normal HD M-W-F       HPI  Debby Carrizales is a 37 y.o. female who presents to the Emergency Department with chief complaint of multiple complaints.  Patient stated that she started feeling bad approximately 1 week ago.  She states that she has had sore throat she has pain with swallowing she has had diffuse myalgias and diffuse weakness.  She states history of severe lupus and occasionally has lupus for hours when which her legs do not work properly.  She is intermittently experienced this over the last week.  Due to her symptoms patient has missed several dialysis sessions she states her last dialysis was 10 days prior.  She states she has actually had an increase in urination.  She has had sensation of fluid retention and swelling.  She has had intermittent headaches she has had intermittent nausea multiple episodes of emesis no blood in emesis.  She denies diarrhea or constipation she denies urinary complaints with the exception of increased frequency.  No abdominal pain she reports some dry nonproductive cough.  Denies any exertional pain in her chest.  Pain is rated as moderate at this time without alleviating or aggravating factors.  Subjective fevers and chills not measured.    REVIEW OF SYSTEMS  10 systems reviewed and otherwise negative, pertinent positives and negatives listed in the history of present illness.    PAST MEDICAL HISTORY   has a past medical history of Arthritis; Avascular necrosis of bones of both hips (HCC) (10/10/2016); Clostridium difficile colitis (5/3/2011); Dialysis patient; ESBL (extended spectrum beta-lactamase) producing bacteria infection (8/25/2014); Fibromyalgia; Hypertension; Lupus; Pneumonia (); Psychiatric disorder; Pyelonephritis (11/21/2017);  and Renal failure.    SURGICAL HISTORY   has a past surgical history that includes gastroscopy-endo (4/10/2011); sclerotheraphy (4/10/2011); gastroscopy-endo (4/18/2011); colonoscopy with biopsy (4/20/2011); gastroscopy-endo (4/27/2011); other (5/2011); other abdominal surgery; av fistula creation (9/9/2014); cath placement (9/9/2014); av fistula creation (11/14/2014); av fistula creation (2/3/2015); hip arthroplasty total (Right, 1/18/2016); closed reduction (Right, 7/5/2016); av fistula creation (Right, 7/12/2016); thrombectomy (Right, 8/20/2016); thrombectomy (Right, 8/21/2016); angioplasty balloon (8/21/2016); tracheostomy; av fistula creation (Right, 12/9/2017); and thrombectomy (12/9/2017).    SOCIAL HISTORY  Social History   Substance Use Topics   • Smoking status: Former Smoker     Packs/day: 0.50     Years: 18.00     Types: Cigarettes     Quit date: 6/13/2011   • Smokeless tobacco: Never Used      Comment: 1/2 ppd   • Alcohol use No      History   Drug Use No     Comment: occ       FAMILY HISTORY  Non-Contributory    CURRENT MEDICATIONS  Home Medications     Reviewed by Iraida Leyva R.N. (Registered Nurse) on 06/07/18 at 0012  Med List Status: Complete   Medication Last Dose Status   ascorbic acid (ASCORBIC ACID) 500 MG Tab 5/29/2018 Active   buPROPion (WELLBUTRIN XL) 150 MG XL tablet 5/29/2018 Active   cholecalciferol (VITAMIN D3) 5000 UNIT Cap 5/29/2018 Active   cinacalcet (SENSIPAR) 30 MG Tab 5/29/2018 Active   ferrous sulfate 325 (65 FE) MG tablet 5/29/2018 Active   hydroxychloroquine (PLAQUENIL) 200 MG Tab 5/29/2018 Active   hydrOXYzine HCl (ATARAX) 50 MG Tab 4/18/2018 Active   lidocaine (LIDODERM) 5 % Patch 5/29/2018 Active   LYRICA 100 MG Cap 5/29/2018 Active   Oxycodone HCl 20 MG Tab  Active   pregabalin (LYRICA) 150 MG Cap 5/1/2018 Active   promethazine (PHENERGAN) 25 MG Tab  Active   sevelamer carbonate (RENVELA) 800 MG Tab tablet 5/29/2018 Active   tizanidine (ZANAFLEX) 4 MG Tab 5/29/2018  "Active   trazodone (DESYREL) 50 MG Tab 5/29/2018 Active                ALLERGIES  Allergies   Allergen Reactions   • Ativan Anxiety     Patient becomes severely paranoid and agitated   • Morphine Itching     Tolerates Dilaudid   • Seasonal Runny Nose and Itching     Hay fever, sabiha brush       PHYSICAL EXAM  VITAL SIGNS: BP (!) 182/99   Pulse (!) 105   Temp 37.9 °C (100.3 °F)   Resp 19   Ht 1.664 m (5' 5.5\")   Wt 85.9 kg (189 lb 6 oz)   SpO2 97%   BMI 31.03 kg/m²   Pulse ox interpretation: I interpret this pulse ox as normal.  Constitutional: Alert and oriented x 3, moderate distress  HEENT: Atraumatic normocephalic, pupils are equal round reactive to light extraocular movements are intact. The nares is clear, external ears are normal, mouth shows moist mucous membranes  Neck: Supple, no JVD no tracheal deviation  Cardiovascular: Borderline tachycardic rub or gallop 2+ pulses peripherally x4  Thorax & Lungs: No respiratory distress, no wheezes rales or rhonchi, No chest tenderness.   GI: Soft nontender nondistended positive bowel sounds, no peritoneal signs    Skin: Warm dry no acute rash or lesion  Musculoskeletal: Moving all extremities with full range and 5 of 5 strength, no acute  deformity  Neurologic: Cranial nerves III through XII are grossly intact, no sensory deficit, no cerebellar dysfunction   Psychiatric: Appropriate affect for situation at this time      DIAGNOSTIC STUDIES / PROCEDURES  LABS      Results for orders placed or performed during the hospital encounter of 06/07/18   LACTIC ACID   Result Value Ref Range    Lactic Acid 0.9 0.5 - 2.0 mmol/L   CBC WITH DIFFERENTIAL   Result Value Ref Range    WBC 9.6 4.8 - 10.8 K/uL    RBC 3.04 (L) 4.20 - 5.40 M/uL    Hemoglobin 9.5 (L) 12.0 - 16.0 g/dL    Hematocrit 28.7 (L) 37.0 - 47.0 %    MCV 94.4 81.4 - 97.8 fL    MCH 31.3 27.0 - 33.0 pg    MCHC 33.1 (L) 33.6 - 35.0 g/dL    RDW 46.1 35.9 - 50.0 fL    Platelet Count 105 (L) 164 - 446 K/uL    MPV 10.3 " 9.0 - 12.9 fL    Neutrophils-Polys 75.30 (H) 44.00 - 72.00 %    Lymphocytes 13.90 (L) 22.00 - 41.00 %    Monocytes 8.20 0.00 - 13.40 %    Eosinophils 1.60 0.00 - 6.90 %    Basophils 0.40 0.00 - 1.80 %    Immature Granulocytes 0.60 0.00 - 0.90 %    Nucleated RBC 0.00 /100 WBC    Neutrophils (Absolute) 7.19 (H) 2.00 - 7.15 K/uL    Lymphs (Absolute) 1.33 1.00 - 4.80 K/uL    Monos (Absolute) 0.78 0.00 - 0.85 K/uL    Eos (Absolute) 0.15 0.00 - 0.51 K/uL    Baso (Absolute) 0.04 0.00 - 0.12 K/uL    Immature Granulocytes (abs) 0.06 0.00 - 0.11 K/uL    NRBC (Absolute) 0.00 K/uL   COMP METABOLIC PANEL   Result Value Ref Range    Sodium 136 135 - 145 mmol/L    Potassium 4.1 3.6 - 5.5 mmol/L    Chloride 106 96 - 112 mmol/L    Co2 9 (LL) 20 - 33 mmol/L    Anion Gap 21.0 (H) 0.0 - 11.9    Glucose 90 65 - 99 mg/dL    Bun 111 (HH) 8 - 22 mg/dL    Creatinine 18.57 (HH) 0.50 - 1.40 mg/dL    Calcium 8.2 (L) 8.5 - 10.5 mg/dL    AST(SGOT) 14 12 - 45 U/L    ALT(SGPT) 15 2 - 50 U/L    Alkaline Phosphatase 105 (H) 30 - 99 U/L    Total Bilirubin 0.6 0.1 - 1.5 mg/dL    Albumin 3.9 3.2 - 4.9 g/dL    Total Protein 7.5 6.0 - 8.2 g/dL    Globulin 3.6 (H) 1.9 - 3.5 g/dL    A-G Ratio 1.1 g/dL   URINALYSIS   Result Value Ref Range    Color Yellow     Character Clear     Specific Gravity 1.012 <1.035    Ph 8.0 5.0 - 8.0    Glucose 250 (A) Negative mg/dL    Ketones Negative Negative mg/dL    Protein 300 (A) Negative mg/dL    Bilirubin Negative Negative    Urobilinogen, Urine 0.2 Negative    Nitrite Negative Negative    Leukocyte Esterase Trace (A) Negative    Occult Blood Moderate (A) Negative    Micro Urine Req Microscopic    INFLUENZA A/B BY PCR   Result Value Ref Range    Influenza virus A RNA Negative Negative    Influenza virus B, PCR Negative Negative   URINE MICROSCOPIC (W/UA)   Result Value Ref Range    WBC 5-10 (A) /hpf    RBC 20-50 (A) /hpf    Bacteria Few (A) None /hpf    Epithelial Cells Few /hpf    Hyaline Cast 3-5 (A) /lpf   ESTIMATED  GFR   Result Value Ref Range    GFR If  3 (A) >60 mL/min/1.73 m 2    GFR If Non African American 2 (A) >60 mL/min/1.73 m 2   Prothrombin time (INR)   Result Value Ref Range    PT 16.4 (H) 12.0 - 14.6 sec    INR 1.35 (H) 0.87 - 1.13   APTT   Result Value Ref Range    APTT 75.4 (H) 24.7 - 36.0 sec   TSH (Thyroid Stimulating Hormone)   Result Value Ref Range    TSH 0.920 0.380 - 5.330 uIU/mL   EKG (NOW)   Result Value Ref Range    Report       [facility] Emergency Dept.    Test Date:  2018  Pt Name:    TAYLOR WARD               Department: ER  MRN:        [id number]                      Room:       [facility]  Gender:     Female                       Technician: [id number]  :        1980                   Requested By:ER TRIAGE PROTOCOL  Order #:    [id number]                    Reading MD: DEREK MALIK MD    Measurements  Intervals                                Axis  Rate:       101                          P:          68  MN:         140                          QRS:        33  QRSD:       78                           T:          41  QT:         328  QTc:        426    Interpretive Statements  SINUS TACHYCARDIA  ARTIFACT IN LEAD(S) I,III,aVR,aVL,aVF  No ST elevation no ST depression no T-wave inversion  Compared to ECG 2017 22:48:52  Sinus rhythm no longer present    Electronically Signed On 2018 6:24:59 PDT by DEREK MALIK MD       All labs reviewed by me.      RADIOLOGY  DX-CHEST-PORTABLE (1 VIEW)   Final Result      Minimal right-sided opacities as described above, suggesting pneumonia.        The radiologist's interpretation of all radiological studies have been reviewed by me.    COURSE & MEDICAL DECISION MAKING  Pertinent Labs & Imaging studies reviewed. (See chart for details)    1:52 AM - Patient seen and examined at bedside.         Patient noted to have slightly elevated blood pressure likely circumstantial secondary to presenting complaint.  "Referred to primary care physician for further evaluation.        Medical Decision Makin-year-old female dialysis dependent without dialysis in 10 days.  Chest x-ray shows some subtle infiltrates in the right lung field.  Patient had blood cultures obtained she was given Omnicef and a azithromycin with the hopes that she may be able to take these as an outpatient.  Her lactic acid is unremarkable.  Her metabolic panel shows derangements of severely reduced CO2 and elevated BUN.  Her potassium and sodium are appropriate at this time.  I discussed with her nephrologist , who agrees to consult and place orders.  Patient was not started on fluid bolus due to concerns of volume overload and end-stage dialysis dependent renal failure.  Patient will be started on a bicarb drip.  Tachycardia resolved she has been afebrile throughout her time here she does not have leukocytosis or other signs concerning for sepsis at this point.  She will be admitted to the hospital for further evaluation and treatment discussed with hospitalist Dr. Mariscal and admitted to the hospital in guarded condition.    BP (!) 182/99   Pulse 96   Temp 37.9 °C (100.3 °F)   Resp 18   Ht 1.664 m (5' 5.5\")   Wt 85.9 kg (189 lb 6 oz)   SpO2 90%   BMI 31.03 kg/m²       FINAL IMPRESSION  1. Uremia    2.  Dialysis noncompliance  3.  Pneumonia  4.  Anion gap metabolic acidosis    This dictation has been created using voice recognition software and/or scribes. The accuracy of the dictation is limited by the abilities of the software and the expertise of the scribes. I expect there may be some errors of grammar and possibly content. I made every attempt to manually correct the errors within my dictation. However, errors related to voice recognition software and/or scribes may still exist and should be interpreted within the appropriate context.            "

## 2018-06-07 NOTE — PROGRESS NOTES
Hd treatment ordered by Dr Najjar started at 0955 and ended at 1257 with net uf of 2400 ml as bp tolerated. Prior to hd treatment, pt went to bathroom and waited for 30 minutes.  Hypertensive entire treatment. See flow sheet for details.

## 2018-06-07 NOTE — ASSESSMENT & PLAN NOTE
Noncompliant with no dialysis for the past 10 days - states she felt to weak to go- post HD  Plan HD tomorrow per nephrology

## 2018-06-07 NOTE — ED NOTES
Maurizio from Lab called with critical result of Venous c02 9 at 0351. Critical lab result read back to Maurizio.   Dr. Perry notified of critical lab result at 0351.  Critical lab result read back by Dr. Perry.     Venous c02 9

## 2018-06-07 NOTE — H&P
Hospital Medicine History and Physical    Date of Service  6/7/2018    Chief Complaint  Chief Complaint   Patient presents with   • Cold Symptoms     x 1 week   • Other     missed dialysis x 1 week, normal HD M-W-F       History of Presenting Illness  37 y.o. female who presented 6/7/2018 with multiple complaints started about 1 week ago she's had sore throat with difficulty swallowing myalgias and diffuse weakness. She has a history of severe lupus and has end-stage renal disease secondary to this. She is missed her dialysis for about 10 days. She also has a cough productive of yellow-green sputum. No fevers no chills. She does have urination with no dysuria. She is also intermittent headaches and some nausea. Denies any chest pain. She does have left lower rib pain with deep inspiration. No alleviating or exacerbating factors to her symptoms.     Primary Care Physician  Sushila Manzano M.D.    Consultants  Nephrology Dr. Najjar    Code Status  Full    Review of Systems  Review of Systems   Constitutional: Negative for chills and fever.   HENT: Positive for congestion. Negative for hearing loss and tinnitus.    Eyes: Negative for blurred vision, double vision and discharge.   Respiratory: Positive for cough and sputum production. Negative for hemoptysis and shortness of breath.    Cardiovascular: Negative for chest pain, palpitations and leg swelling.   Gastrointestinal: Positive for nausea. Negative for abdominal pain, heartburn and vomiting.   Genitourinary: Negative for dysuria and flank pain.   Musculoskeletal: Negative for joint pain and myalgias.   Skin: Negative for rash.   Neurological: Positive for weakness. Negative for dizziness, sensory change, speech change and focal weakness.   Endo/Heme/Allergies: Negative for environmental allergies. Does not bruise/bleed easily.   Psychiatric/Behavioral: Negative for depression, hallucinations and substance abuse.        Past Medical History  Past Medical  History:   Diagnosis Date   • Pyelonephritis 11/21/2017   • Avascular necrosis of bones of both hips (HCC) 10/10/2016   • ESBL (extended spectrum beta-lactamase) producing bacteria infection 8/25/2014   • Pneumonia    • Clostridium difficile colitis 5/3/2011   • Arthritis     all joints,r/t lupus   • Dialysis patient    • Fibromyalgia    • Hypertension    • Lupus    • Psychiatric disorder     anxiety, depression   • Renal failure        Surgical History  Past Surgical History:   Procedure Laterality Date   • AV FISTULA CREATION Right 12/9/2017    Procedure: AV FISTULA CREATION-ARM FOR GRAFT;  Surgeon: Lesly Jansen M.D.;  Location: [facility];  Service: General   • THROMBECTOMY  12/9/2017    Procedure: THROMBECTOMY;  Surgeon: Lesly Jansen M.D.;  Location: [facility];  Service: General   • THROMBECTOMY Right 8/21/2016    Procedure: THROMBECTOMY - right AV fistula graft with grams;  Surgeon: Shabbir Ardon M.D.;  Location: [facility];  Service:    • ANGIOPLASTY BALLOON  8/21/2016    Procedure: ANGIOPLASTY BALLOON;  Surgeon: Shabbir Ardon M.D.;  Location: [facility];  Service:    • THROMBECTOMY Right 8/20/2016    Procedure: THROMBECTOMY AV GRAFT;  Surgeon: Shabbir Ardon M.D.;  Location: [facility];  Service:    • AV FISTULA CREATION Right 7/12/2016    Procedure: AV FISTULA CREATION WITH GRAFT BRACHIAL AXILLARY;  Surgeon: Shabbir Ardon M.D.;  Location: [facility];  Service:    • CLOSED REDUCTION Right 7/5/2016    Procedure: CLOSED REDUCTION- Hip ;  Surgeon: Michael Holman M.D.;  Location: [facility];  Service:    • HIP ARTHROPLASTY TOTAL Right 1/18/2016    Procedure: HIP ARTHROPLASTY TOTAL;  Surgeon: Michael Holman M.D.;  Location: [facility];  Service:    • AV FISTULA CREATION  2/3/2015    Performed by Shabbir Ardon M.D. at [facility]   • AV FISTULA CREATION   11/14/2014    Performed by Shabbir Ardon M.D. at SURGERY [facility]   • AV FISTULA CREATION  9/9/2014    Performed by Shabbir Ardon M.D. at SURGERY [facility]   • CATH PLACEMENT  9/9/2014    Performed by Shabbir Ardon M.D. at SURGERY [facility]   • OTHER  5/2011    tracheostomy   • GASTROSCOPY-ENDO  4/27/2011    Performed by PALMIRA DOAN at ENDOSCOPY [facility]   • COLONOSCOPY WITH BIOPSY  4/20/2011    Performed by ALCIRA CRUZ at ENDOSCOPY [facility]   • GASTROSCOPY-ENDO  4/18/2011    Performed by ALCIRA CRUZ at ENDOSCOPY [facility]   • GASTROSCOPY-ENDO  4/10/2011    Performed by SYED JURADO at ENDOSCOPY [facility]   • SCLEROTHERAPHY  4/10/2011    Performed by SYED JURADO at ENDOSCOPY [facility]   • OTHER ABDOMINAL SURGERY      kidney biopsy   • TRACHEOSTOMY         Medications  No current facility-administered medications on file prior to encounter.      Current Outpatient Prescriptions on File Prior to Encounter   Medication Sig Dispense Refill   • LYRICA 100 MG Cap TK ONE C PO TID FOR 30 DAYS  2   • promethazine (PHENERGAN) 25 MG Tab TK 1 T PO BID  1   • Oxycodone HCl 20 MG Tab Take 1 Tab by mouth every 6 hours as needed (Take up to three a day as needed and one additional pill after dialysis (3 times a week)) for up to 30 days. 102 Tab 0   • cinacalcet (SENSIPAR) 30 MG Tab Take 30 mg by mouth every bedtime.     • pregabalin (LYRICA) 150 MG Cap Take 150 mg by mouth 3 times a day.     • sevelamer carbonate (RENVELA) 800 MG Tab tablet Take 2,400 mg by mouth 3 times a day, with meals.     • hydrOXYzine HCl (ATARAX) 50 MG Tab Take 1 Tab by mouth 3 times a day as needed for Anxiety. 90 Tab 0   • lidocaine (LIDODERM) 5 % Patch Apply 1 Patch to skin as directed every 24 hours. Apply to back     • ferrous sulfate 325 (65 FE) MG tablet Take 325 mg by mouth every day.     • ascorbic acid (ASCORBIC ACID) 500 MG Tab Take 500 mg by mouth  every day.     • buPROPion (WELLBUTRIN XL) 150 MG XL tablet Take 150 mg by mouth every morning.     • hydroxychloroquine (PLAQUENIL) 200 MG Tab Take 200 mg by mouth every bedtime.     • cholecalciferol (VITAMIN D3) 5000 UNIT Cap Take 10,000 Units by mouth every day.     • trazodone (DESYREL) 50 MG Tab Take 1 Tab by mouth every bedtime. 30 Tab 0   • tizanidine (ZANAFLEX) 4 MG Tab Take 2 Tabs by mouth every bedtime. 60 Tab 0       Family History  Family History   Problem Relation Age of Onset   • Heart Disease Mother    • Cancer Father        Social History  Social History   Substance Use Topics   • Smoking status: Former Smoker     Packs/day: 0.50     Years: 18.00     Types: Cigarettes     Quit date: 2011   • Smokeless tobacco: Never Used      Comment:  ppd   • Alcohol use No       Allergies  Allergies   Allergen Reactions   • Ativan Anxiety     Patient becomes severely paranoid and agitated   • Morphine Itching     Tolerates Dilaudid   • Seasonal Runny Nose and Itching     Hay fever, sabiha brush        Physical Exam  Laboratory   Hemodynamics  Temp (24hrs), Av.9 °C (100.3 °F), Min:37.9 °C (100.3 °F), Max:37.9 °C (100.3 °F)   Temperature: 37.9 °C (100.3 °F)  Pulse  Av.1  Min: 91  Max: 105 Heart Rate (Monitored): 96  Blood Pressure: (!) 182/99, NIBP: (!) 164/105      Respiratory      Respiration: 18, Pulse Oximetry: 93 %             Physical Exam   Constitutional: She is oriented to person, place, and time. She appears well-developed and well-nourished. No distress.   HENT:   Head: Normocephalic and atraumatic.   Eyes: Conjunctivae and EOM are normal. Pupils are equal, round, and reactive to light.   Neck: Normal range of motion. Neck supple. No JVD present.   Cardiovascular: Regular rhythm, normal heart sounds and intact distal pulses.    No murmur heard.  tachycardic   Pulmonary/Chest: Effort normal and breath sounds normal. No respiratory distress. She has no wheezes.   Reduced breath sounds at the  bases   Abdominal: Soft. Bowel sounds are normal. She exhibits no distension. There is no tenderness.   Musculoskeletal: Normal range of motion. She exhibits no edema.   Neurological: She is alert and oriented to person, place, and time. She exhibits normal muscle tone.   Skin: Skin is warm and dry.   Psychiatric: She has a normal mood and affect. Her behavior is normal. Judgment and thought content normal.   Nursing note and vitals reviewed.      Recent Labs      06/07/18 0229   WBC  9.6   RBC  3.04*   HEMOGLOBIN  9.5*   HEMATOCRIT  28.7*   MCV  94.4   MCH  31.3   MCHC  33.1*   RDW  46.1   PLATELETCT  105*   MPV  10.3     Recent Labs      06/07/18 0229   SODIUM  136   POTASSIUM  4.1   CHLORIDE  106   CO2  9*   GLUCOSE  90   BUN  111*   CREATININE  18.57*   CALCIUM  8.2*     Recent Labs      06/07/18 0229   ALTSGPT  15   ASTSGOT  14   ALKPHOSPHAT  105*   TBILIRUBIN  0.6   GLUCOSE  90                 Lab Results   Component Value Date    TROPONINI <0.01 11/21/2017     Urinalysis:    Lab Results  Component Value Date/Time   SPECGRAVITY 1.012 06/07/2018 0229   GLUCOSEUR 250 (A) 06/07/2018 0229   KETONES Negative 06/07/2018 0229   NITRITE Negative 06/07/2018 0229   WBCURINE 5-10 (A) 06/07/2018 0229   RBCURINE 20-50 (A) 06/07/2018 0229   BACTERIA Few (A) 06/07/2018 0229   EPITHELCELL Few 06/07/2018 0229        Imaging  DX-CHEST-PORTABLE (1 VIEW) [[id number]] Resulted: 06/07/18 0323   Order Status: Completed Updated: 06/07/18 0325   Narrative:     6/7/2018 2:20 AM    HISTORY/REASON FOR EXAM:  Cough and chest congestion      TECHNIQUE/EXAM DESCRIPTION AND NUMBER OF VIEWS:  Single portable view of the chest.    COMPARISON: 11/21/2017    FINDINGS:  The heart is enlarged. The jakcy and mediastinum are normal. There are minimal hazy opacities in the right perihilar region and upper lung field. Left lung shows no definite acute abnormality.   Impression:       Minimal right-sided opacities as described above, suggesting  pneumonia.        Assessment/Plan     I anticipate this patient will require at least two midnights for appropriate medical management, necessitating inpatient admission.    * Sepsis due to pneumonia ([facility])   Assessment & Plan    This is sepsis (without associated acute organ dysfunction).   Tachycardia and tachypnea  Check procal, likely source pneumonia   Patient is immunocompromised on plaquenil  IV abx   Follow cultures   Check lactic   descilate therapy as clinically appropriate        ESRD (end stage renal disease) on dialysis ([facility])- (present on admission)   Assessment & Plan    Noncompliant with no dialysis for the past 10 days   Dr. Najjar consulted by ERP   Some acidosis will give bicarb until dialysis session        Lupus (systemic lupus erythematosus) ([facility])- (present on admission)   Assessment & Plan    Resume home medications        Acidosis   Assessment & Plan    Needs dialysis   Will give sodium bicarb for now        Obesity (BMI 30-39.9)- (present on admission)   Assessment & Plan    Encourage weight loss        Hypertension- (present on admission)   Assessment & Plan    Not on home antihypertensives  reasses after dialysis           Depression- (present on admission)   Assessment & Plan    Resume hole wellutrin        Anemia- (present on admission)   Assessment & Plan    Likely anemia of chronic disease from dialysis   Will check labs        Thrombocytopenia ([facility])- (present on admission)   Assessment & Plan    Chronic   Trend lab        Opioid dependence ([facility])- (present on admission)   Assessment & Plan    chronic        Anxiety- (present on admission)   Assessment & Plan    Resume hydroxyzine            VTE prophylaxis: heparin

## 2018-06-07 NOTE — PROGRESS NOTES
"Pt arrived to unit with transport at 1340. Ambulated from [facility] to bed 2 with steady gait.      Reviewed VS, labs, orders, and notes.     A&Ox 4.    /99   Pulse 100   Temp 37.1 °C (98.7 °F)   Resp 17   Ht 1.664 m (5' 5.5\")   Wt 85.9 kg (189 lb 6 oz)   SpO2 90%   Breastfeeding? No   BMI 31.03 kg/m² . MEWS Score: 1.     0.5 L oxygen. Denies SOB.    Moves all extremities, baseline numbness and tingling in fingers and toes.    Skin assessed over bony prominences and under medical devices. See 2 RN skin assessment note.     Voiding, active BS, + flatus, LBM 6/6 PTA.     Renal diet, denies nausea/ vomiting.     R AV fistula; Left chest port accessed by ED.     Patient reports 8 /10 pain in back, flank, head, medicated per MAR. Re-assessment pain level 5/10.     Pt. is SBA assist with steady gait.    Reno Burton Fall Risk Score: No risk. Fall prevention education reinforced with pt. Pt is wearing treaded slipper socks, IV pole is on the same side as the bathroom, lower bedrails are down. Pt calls appropriatly.     Discussed POC, all questions answered at this time. Bed is locked and in the lowest position, call light within reach, Q 1 hour rounding in place. All needs met at this time.   "

## 2018-06-07 NOTE — DISCHARGE PLANNING
Outpatient Dialysis Note    Confirmed patient is active at:    [facility]  [address]   [place] [address]      Schedule: Monday, Wednesday, Friday  Time: 3:30 pm    Spoke with Nadine at facility who confirmed. Due to patient being gone from the dialysis clinic for a long period of time, we will need to have Hep Panel and Quantiferon drawn.    Forwarded records for review.    Dialysis Coordinator, [facility]  Kelli Anderson [phone]

## 2018-06-08 LAB
ALBUMIN SERPL BCP-MCNC: 3.5 G/DL (ref 3.2–4.9)
ALBUMIN/GLOB SERPL: 0.9 G/DL
ALP SERPL-CCNC: 92 U/L (ref 30–99)
ALT SERPL-CCNC: 14 U/L (ref 2–50)
ANION GAP SERPL CALC-SCNC: 17 MMOL/L (ref 0–11.9)
AST SERPL-CCNC: 15 U/L (ref 12–45)
BILIRUB SERPL-MCNC: 0.5 MG/DL (ref 0.1–1.5)
BUN SERPL-MCNC: 58 MG/DL (ref 8–22)
CALCIUM SERPL-MCNC: 7.9 MG/DL (ref 8.5–10.5)
CHLORIDE SERPL-SCNC: 99 MMOL/L (ref 96–112)
CHOLEST SERPL-MCNC: 113 MG/DL (ref 100–199)
CO2 SERPL-SCNC: 23 MMOL/L (ref 20–33)
CREAT SERPL-MCNC: 11.4 MG/DL (ref 0.5–1.4)
GLOBULIN SER CALC-MCNC: 3.7 G/DL (ref 1.9–3.5)
GLUCOSE SERPL-MCNC: 89 MG/DL (ref 65–99)
HDLC SERPL-MCNC: 21 MG/DL
LDLC SERPL CALC-MCNC: 64 MG/DL
M TB TUBERC IFN-G BLD QL: NEGATIVE
M TB TUBERC IFN-G/MITOGEN IGNF BLD: 0
M TB TUBERC IGNF/MITOGEN IGNF CONTROL: 55.44 [IU]/ML
MITOGEN IGNF BCKGRD COR BLD-ACNC: 0.02 [IU]/ML
POTASSIUM SERPL-SCNC: 3.2 MMOL/L (ref 3.6–5.5)
PROT SERPL-MCNC: 7.2 G/DL (ref 6–8.2)
SODIUM SERPL-SCNC: 139 MMOL/L (ref 135–145)
TRIGL SERPL-MCNC: 141 MG/DL (ref 0–149)

## 2018-06-08 PROCEDURE — A9270 NON-COVERED ITEM OR SERVICE: HCPCS | Performed by: HOSPITALIST

## 2018-06-08 PROCEDURE — 700111 HCHG RX REV CODE 636 W/ 250 OVERRIDE (IP)

## 2018-06-08 PROCEDURE — 700111 HCHG RX REV CODE 636 W/ 250 OVERRIDE (IP): Performed by: HOSPITALIST

## 2018-06-08 PROCEDURE — 700102 HCHG RX REV CODE 250 W/ 637 OVERRIDE(OP): Performed by: HOSPITALIST

## 2018-06-08 PROCEDURE — 99232 SBSQ HOSP IP/OBS MODERATE 35: CPT | Performed by: HOSPITALIST

## 2018-06-08 PROCEDURE — 770006 HCHG ROOM/CARE - MED/SURG/GYN SEMI*

## 2018-06-08 PROCEDURE — 90935 HEMODIALYSIS ONE EVALUATION: CPT

## 2018-06-08 PROCEDURE — 80053 COMPREHEN METABOLIC PANEL: CPT

## 2018-06-08 PROCEDURE — 90935 HEMODIALYSIS ONE EVALUATION: CPT | Performed by: INTERNAL MEDICINE

## 2018-06-08 PROCEDURE — 700111 HCHG RX REV CODE 636 W/ 250 OVERRIDE (IP): Mod: JG | Performed by: INTERNAL MEDICINE

## 2018-06-08 PROCEDURE — 80061 LIPID PANEL: CPT

## 2018-06-08 PROCEDURE — 5A1D70Z PERFORMANCE OF URINARY FILTRATION, INTERMITTENT, LESS THAN 6 HOURS PER DAY: ICD-10-PCS | Performed by: INTERNAL MEDICINE

## 2018-06-08 RX ORDER — GUAIFENESIN 600 MG/1
600 TABLET, EXTENDED RELEASE ORAL EVERY 12 HOURS
Status: DISCONTINUED | OUTPATIENT
Start: 2018-06-08 | End: 2018-06-09 | Stop reason: HOSPADM

## 2018-06-08 RX ORDER — HEPARIN SODIUM 1000 [USP'U]/ML
INJECTION, SOLUTION INTRAVENOUS; SUBCUTANEOUS
Status: COMPLETED
Start: 2018-06-08 | End: 2018-06-08

## 2018-06-08 RX ORDER — DOXYCYCLINE 100 MG/1
100 TABLET ORAL EVERY 12 HOURS
Status: DISCONTINUED | OUTPATIENT
Start: 2018-06-08 | End: 2018-06-09 | Stop reason: HOSPADM

## 2018-06-08 RX ADMIN — SEVELAMER CARBONATE 2400 MG: 800 TABLET, FILM COATED ORAL at 09:09

## 2018-06-08 RX ADMIN — DOXYCYCLINE 100 MG: 100 TABLET ORAL at 20:56

## 2018-06-08 RX ADMIN — HYDROXYCHLOROQUINE SULFATE 200 MG: 200 TABLET, FILM COATED ORAL at 20:57

## 2018-06-08 RX ADMIN — FERROUS SULFATE TAB 325 MG (65 MG ELEMENTAL FE) 325 MG: 325 (65 FE) TAB at 09:10

## 2018-06-08 RX ADMIN — HEPARIN SODIUM 1500 UNITS: 1000 INJECTION, SOLUTION INTRAVENOUS; SUBCUTANEOUS at 10:09

## 2018-06-08 RX ADMIN — PROMETHAZINE HYDROCHLORIDE 25 MG: 25 TABLET ORAL at 06:53

## 2018-06-08 RX ADMIN — TRAZODONE HYDROCHLORIDE 50 MG: 50 TABLET ORAL at 20:58

## 2018-06-08 RX ADMIN — SEVELAMER CARBONATE 2400 MG: 800 TABLET, FILM COATED ORAL at 17:37

## 2018-06-08 RX ADMIN — PROMETHAZINE HYDROCHLORIDE 25 MG: 25 TABLET ORAL at 02:12

## 2018-06-08 RX ADMIN — PROMETHAZINE HYDROCHLORIDE 25 MG: 25 TABLET ORAL at 14:16

## 2018-06-08 RX ADMIN — AZITHROMYCIN 500 MG: 250 TABLET, FILM COATED ORAL at 09:10

## 2018-06-08 RX ADMIN — OXYCODONE HYDROCHLORIDE 20 MG: 10 TABLET ORAL at 06:53

## 2018-06-08 RX ADMIN — PREGABALIN 150 MG: 75 CAPSULE ORAL at 14:16

## 2018-06-08 RX ADMIN — EPOETIN ALFA 4000 UNITS: 4000 SOLUTION INTRAVENOUS; SUBCUTANEOUS at 12:59

## 2018-06-08 RX ADMIN — OXYCODONE HYDROCHLORIDE 20 MG: 10 TABLET ORAL at 20:55

## 2018-06-08 RX ADMIN — OXYCODONE HYDROCHLORIDE 20 MG: 10 TABLET ORAL at 14:09

## 2018-06-08 RX ADMIN — CINACALCET HYDROCHLORIDE 30 MG: 30 TABLET, COATED ORAL at 20:59

## 2018-06-08 RX ADMIN — PROMETHAZINE HYDROCHLORIDE 25 MG: 25 TABLET ORAL at 18:27

## 2018-06-08 RX ADMIN — HEPARIN SODIUM 5000 UNITS: 5000 INJECTION, SOLUTION INTRAVENOUS; SUBCUTANEOUS at 14:09

## 2018-06-08 RX ADMIN — CEFTRIAXONE 2 G: 2 INJECTION, POWDER, FOR SOLUTION INTRAMUSCULAR; INTRAVENOUS at 09:22

## 2018-06-08 RX ADMIN — PREGABALIN 150 MG: 75 CAPSULE ORAL at 20:56

## 2018-06-08 RX ADMIN — TIZANIDINE 8 MG: 4 TABLET ORAL at 20:57

## 2018-06-08 RX ADMIN — SEVELAMER CARBONATE 2400 MG: 800 TABLET, FILM COATED ORAL at 14:09

## 2018-06-08 RX ADMIN — PREGABALIN 150 MG: 75 CAPSULE ORAL at 09:10

## 2018-06-08 RX ADMIN — GUAIFENESIN 600 MG: 600 TABLET, EXTENDED RELEASE ORAL at 20:57

## 2018-06-08 RX ADMIN — STANDARDIZED SENNA CONCENTRATE AND DOCUSATE SODIUM 2 TABLET: 8.6; 5 TABLET, FILM COATED ORAL at 09:10

## 2018-06-08 RX ADMIN — OXYCODONE HYDROCHLORIDE 20 MG: 10 TABLET ORAL at 02:10

## 2018-06-08 RX ADMIN — HEPARIN SODIUM 5000 UNITS: 5000 INJECTION, SOLUTION INTRAVENOUS; SUBCUTANEOUS at 20:58

## 2018-06-08 RX ADMIN — BUPROPION HYDROCHLORIDE 150 MG: 150 TABLET, EXTENDED RELEASE ORAL at 09:10

## 2018-06-08 ASSESSMENT — ENCOUNTER SYMPTOMS
STRIDOR: 0
HALLUCINATIONS: 0
WEAKNESS: 1
SHORTNESS OF BREATH: 1
DIARRHEA: 0
PALPITATIONS: 0
SHORTNESS OF BREATH: 0
ABDOMINAL PAIN: 0
CHILLS: 1
EYE REDNESS: 0
BACK PAIN: 0
COUGH: 1
SPEECH CHANGE: 0
VOMITING: 0
COUGH: 0
TREMORS: 0
WHEEZING: 0
FEVER: 0
NAUSEA: 0
EYE DISCHARGE: 0
SPUTUM PRODUCTION: 1
DIAPHORESIS: 0
SENSORY CHANGE: 0
NECK PAIN: 0
FOCAL WEAKNESS: 0
FLANK PAIN: 0

## 2018-06-08 ASSESSMENT — PAIN SCALES - GENERAL
PAINLEVEL_OUTOF10: 3
PAINLEVEL_OUTOF10: 7
PAINLEVEL_OUTOF10: 4
PAINLEVEL_OUTOF10: 7
PAINLEVEL_OUTOF10: 7

## 2018-06-08 ASSESSMENT — LIFESTYLE VARIABLES: SUBSTANCE_ABUSE: 0

## 2018-06-08 NOTE — PROGRESS NOTES
"Pt A&O x4. Lethargic, sleeping, falls asleep during conversation, fatigue.     Vitals: /90   Pulse 80   Temp 36.1 °C (97 °F)   Resp 16   Ht 1.664 m (5' 5.5\")   Wt 85.9 kg (189 lb 6 oz)   SpO2 99%   Breastfeeding? No   BMI 31.03 kg/m²     Denies having any pain.     Neuro: CHESTER. Denies new onset of numbness/ tingling. Baseline numbness/ tingling in bilateral fingers and toes.     Cardiac: Denies new onset of chest pain.    Vascular: Pulses 2+ BUE, BLE. No edema noted. RUE AV fistula in place, dressing CDI, pt was dialyzed today. Bruit auscultated, thrill palpated.     Respiratory: Lungs sound dimiished in bases. Unable to perform IS at this time. On 1L O2 NC.  on, satting in high 90's. Denies SOB.    GI: Abdomen soft, rounded, obese. On renal diet, tolerating well, decreased appetite. - nausea/ vomiting.    : Pt can be anuric at times due to ESRD and dialysis.     MSK: Pt up to bathroom x1 assist using FWW, generalized weakness, fatigue.     Integumentary: Intact. Left upper chest port accessed, dressing CDI, closed line set in use, TKO running.     Labs noted.    Fall precautions in place: Bed locked in lowest position, Upper bed rails up, treaded socks in place, personal belongings within reach, call light within reach, appropriate mobility signs in place, + bed alarm. Pt calls appropriately.     Pt updated on POC.   "

## 2018-06-08 NOTE — CARE PLAN
Problem: Safety  Goal: Will remain free from falls  Outcome: PROGRESSING AS EXPECTED    Intervention: Assess risk factors for falls   06/08/18 0152   OTHER   Fall Risk High Risk to Fall - 2 or more points    Risk for Injury-Any positive answers results in the pt being at high risk for fall related injury Not Applicable   Mobility Status Assessment 1-1 Healthcare Provider Required for Assistance with Ambulation & Transfer   History of fall 2   Pt Calls for Assistance Yes     Intervention: Implement fall precautions   06/07/18 2000 06/08/18 0152   OTHER   Environmental Precautions Treaded Slipper Socks on Patient;Personal Belongings, Wastebasket, Call Bell etc. in Easy Reach;Report Given to Other Health Care Providers Regarding Fall Risk;Bed in Low Position;Communication Sign for Patients & Families;Mobility Assessed & Appropriate Sign Placed --    IV Pole on Same Side of Bed as Bathroom --  Yes   Bedrails --  Bedrails Closest to Bathroom Down         Problem: Venous Thromboembolism (VTW)/Deep Vein Thrombosis (DVT) Prevention:  Goal: Patient will participate in Venous Thrombosis (VTE)/Deep Vein Thrombosis (DVT)Prevention Measures  Outcome: PROGRESSING AS EXPECTED   06/07/18 1457 06/07/18 2000   OTHER   Risk Assessment Score 1 --    VTE RISK Moderate --    Pharmacologic Prophylaxis Used --  Unfractionated Heparin

## 2018-06-08 NOTE — PROGRESS NOTES
[facility] Hospitalist Progress Note    Date of Service: 2018    Chief Complaint  37 y.o. Female hx of ESRD , lupus admitted 2018 with cough, congestion, weakness. Dx severe metabolic acidosis, , Pneumonia.     Interval Problem Update  States she missed HD due to feeling weak. Metabolic acidosis resolved. Still feeling weak w cough and congestion.     Consultants/Specialty  Dr Stevens nephrology     Disposition  Dc home when improved symptoms.         Review of Systems   Constitutional: Positive for chills and malaise/fatigue. Negative for diaphoresis and fever.   HENT: Positive for congestion.    Eyes: Negative for discharge and redness.   Respiratory: Positive for cough, sputum production and shortness of breath. Negative for wheezing and stridor.    Cardiovascular: Negative for chest pain, palpitations and leg swelling.   Gastrointestinal: Negative for abdominal pain, diarrhea and vomiting.   Genitourinary: Negative for flank pain and hematuria.   Musculoskeletal: Negative for back pain (right lower w cough), joint pain and neck pain.   Neurological: Positive for weakness. Negative for tremors, sensory change, speech change and focal weakness.   Psychiatric/Behavioral: Negative for hallucinations and substance abuse.      Physical Exam  Laboratory/Imaging   Hemodynamics  Temp (24hrs), Av.4 °C (97.6 °F), Min:36 °C (96.8 °F), Max:37.5 °C (99.5 °F)   Temperature: 36.2 °C (97.1 °F)  Pulse  Av.9  Min: 68  Max: 108    Blood Pressure: 116/62      Respiratory      Respiration: 18, Pulse Oximetry: 93 %, O2 Daily Delivery Respiratory : Silicone Nasal Cannula     Work Of Breathing / Effort: Mild  RUL Breath Sounds: Clear, RML Breath Sounds: Diminished, RLL Breath Sounds: Diminished, LASHANDA Breath Sounds: Clear, LLL Breath Sounds: Diminished    Fluids    Intake/Output Summary (Last 24 hours) at 18 1620  Last data filed at 18 0400   Gross per 24 hour   Intake              450 ml   Output                0  ml   Net              450 ml       Nutrition  Orders Placed This Encounter   Procedures   • Diet Order     Standing Status:   Standing     Number of Occurrences:   1     Order Specific Question:   Diet:     Answer:   Renal [8]     Physical Exam   Constitutional: She is oriented to person, place, and time. No distress.   HENT:   Head: Normocephalic and atraumatic.   Right Ear: External ear normal.   Left Ear: External ear normal.   Nose: Nose normal.   Eyes: EOM are normal. Right eye exhibits no discharge. Left eye exhibits no discharge. No scleral icterus.   Neck: Neck supple. No JVD present.   Cardiovascular: Normal rate and regular rhythm.    No murmur heard.  Pulmonary/Chest: No stridor. She has no wheezes. She has no rales.   Dim bs lung bases    Abdominal: Soft. Bowel sounds are normal. She exhibits no distension. There is no tenderness.   Musculoskeletal: She exhibits no edema or tenderness.   Neurological: She is alert and oriented to person, place, and time. No cranial nerve deficit.   Skin: Skin is warm and dry. She is not diaphoretic. No pallor.   Psychiatric: She has a normal mood and affect. Her behavior is normal.   Vitals reviewed.      Recent Labs      06/07/18 0229   WBC  9.6   RBC  3.04*   HEMOGLOBIN  9.5*   HEMATOCRIT  28.7*   MCV  94.4   MCH  31.3   MCHC  33.1*   RDW  46.1   PLATELETCT  105*   MPV  10.3     Recent Labs      06/07/18 0229 06/07/18   1445  06/08/18   0434   SODIUM  136  136  139   POTASSIUM  4.1  3.1*  3.2*   CHLORIDE  106  96  99   CO2  9*  22  23   GLUCOSE  90  91  89   BUN  111*  48*  58*   CREATININE  18.57*  9.55*  11.40*   CALCIUM  8.2*  9.0  7.9*     Recent Labs      06/07/18 0229   APTT  75.4*   INR  1.35*         Recent Labs      06/08/18   0434   TRIGLYCERIDE  141   HDL  21*   LDL  64          Assessment/Plan     * Sepsis due to pneumonia (HCC)   Assessment & Plan    This is sepsis (without associated acute organ dysfunction).  Due to pneumonia.  Tachycardia and  tachypnea, pro calcitonin mildly elevated.   Patient is immunocompromised on plaquenil  IV abx -- change to c3 , doxy for extended HCAP coverage.  Follow cultures           ESRD (end stage renal disease) on dialysis ([facility])- (present on admission)   Assessment & Plan    Noncompliant with no dialysis for the past 10 days - states she felt to weak to go- post HD  Plan HD tomorrow per nephrology         Pneumonia- (present on admission)   Assessment & Plan    IV rocephin, doxy  Fu cultures  Add mucolytic.         Lupus (systemic lupus erythematosus) ([facility])- (present on admission)   Assessment & Plan    Resume home medications- plaquenil         Acidosis   Assessment & Plan    Corrected, off bicarb gtt        Obesity (BMI 30-39.9)- (present on admission)   Assessment & Plan    Encourage weight loss        Hypertension- (present on admission)   Assessment & Plan    Mild ranges  Add prn clonidine  Monitor.           Depression- (present on admission)   Assessment & Plan    Resume hole wellutrin        Anemia- (present on admission)   Assessment & Plan    Likely anemia of chronic disease from dialysis   Will check labs        Thrombocytopenia (CMS-[facility])- (present on admission)   Assessment & Plan    Chronic   Trend lab        Opioid dependence ([facility])- (present on admission)   Assessment & Plan    chronic        Anxiety- (present on admission)   Assessment & Plan    Resume hydroxyzine          Quality-Core Measures   Reviewed items::  Labs reviewed, Medications reviewed and Radiology images reviewed  Taylor catheter::  No Taylor  DVT prophylaxis pharmacological::  Heparin  Antibiotics:  Treating active infection/contamination beyond 24 hours perioperative coverage

## 2018-06-08 NOTE — PROGRESS NOTES
Dr Mack updated on pt lab values, headache and elevated BP. Received order with read back for tylenol 650 mg Q 4 PRN, Clonidine 0.1 mg Q 6 for SBP >170, and to discontinue continuous sodium bicarbonate infusion. No other orders received.

## 2018-06-08 NOTE — PROGRESS NOTES
[facility] Progress Note    Hemodialysis treatment ordered today per Dr. Najjar x 3 hours. Treatment initiated at 1009, ended at 1309.     Patient complained of bilateral lower leg cramps and nausea 5 min before the end of tx, warm compress provided, Pt condition improved after blood returned, VSS post HD.; see paper flow sheet for details.     Net UF 1890 mL.     Post tx, needles removed from RUE AVF, held sites until bleeding resolved, dry dressing applied, CDI. Primary RN instructed to monitor for breakthrough bleeding, if bleeding occurs, put pressure on the site and apply dry and clean gauze.  Report given to Primary RN.

## 2018-06-08 NOTE — PROGRESS NOTES
Hospital Medicine Progress Note, Adult, Complex               Author: Fadi Najjar Date & Time created: 6/8/2018  1:58 PM     Interval History:  Pt with ESRD, presented with weakness, missed her HD as outpt.  Seen and examined while getting HD.  Doing better    Review of Systems:  Review of Systems   Respiratory: Negative for cough and shortness of breath.    Cardiovascular: Negative for chest pain and leg swelling.   Gastrointestinal: Negative for nausea and vomiting.   Genitourinary: Negative for dysuria and urgency.       Physical Exam:  Physical Exam   Constitutional: She is oriented to person, place, and time.   HENT:   Right Ear: External ear normal.   Left Ear: External ear normal.   Nose: Nose normal.   Cardiovascular: Normal rate and regular rhythm.    No murmur heard.  Pulmonary/Chest: Effort normal.   Musculoskeletal: She exhibits no edema.   Neurological: She is alert and oriented to person, place, and time.   Nursing note and vitals reviewed.      Labs:        Invalid input(s): [id number]      Recent Labs      06/07/18 0229 06/07/18 1445 06/08/18 0434   SODIUM  136  136  139   POTASSIUM  4.1  3.1*  3.2*   CHLORIDE  106  96  99   CO2  9*  22  23   BUN  111*  48*  58*   CREATININE  18.57*  9.55*  11.40*   CALCIUM  8.2*  9.0  7.9*     Recent Labs      06/07/18 0229 06/07/18 1445  06/08/18   0434   ALTSGPT  15   --   14   ASTSGOT  14   --   15   ALKPHOSPHAT  105*   --   92   TBILIRUBIN  0.6   --   0.5   GLUCOSE  90  91  89     Recent Labs      06/07/18 0229   RBC  3.04*   HEMOGLOBIN  9.5*   HEMATOCRIT  28.7*   PLATELETCT  105*   PROTHROMBTM  16.4*   APTT  75.4*   INR  1.35*     Recent Labs      06/07/18 0229 06/08/18 0434   WBC  9.6   --    NEUTSPOLYS  75.30*   --    LYMPHOCYTES  13.90*   --    MONOCYTES  8.20   --    EOSINOPHILS  1.60   --    BASOPHILS  0.40   --    ASTSGOT  14  15   ALTSGPT  15  14   ALKPHOSPHAT  105*  92   TBILIRUBIN  0.6  0.5           Hemodynamics:  Temp (24hrs),  Av.6 °C (97.9 °F), Min:36 °C (96.8 °F), Max:37.5 °C (99.5 °F)  Temperature: 36.2 °C (97.1 °F)  Pulse  Av.9  Min: 68  Max: 108   Blood Pressure: 116/62     Respiratory:    Respiration: 18, Pulse Oximetry: 93 %, O2 Daily Delivery Respiratory : Silicone Nasal Cannula     Work Of Breathing / Effort: Mild  RUL Breath Sounds: Clear, RML Breath Sounds: Diminished, RLL Breath Sounds: Diminished, LASHANDA Breath Sounds: Clear, LLL Breath Sounds: Diminished  Fluids:    Intake/Output Summary (Last 24 hours) at 18 1358  Last data filed at 18 0400   Gross per 24 hour   Intake              618 ml   Output                0 ml   Net              618 ml        GI/Nutrition:  Orders Placed This Encounter   Procedures   • Diet Order     Standing Status:   Standing     Number of Occurrences:   1     Order Specific Question:   Diet:     Answer:   Renal [8]     Medical Decision Making, by Problem:  Active Hospital Problems    Diagnosis   • *Sepsis due to pneumonia ([facility]) [J18.9, A41.9]   • ESRD (end stage renal disease) on dialysis ([facility]) [N18.6, Z99.2]   • Lupus (systemic lupus erythematosus) ([facility]) [M32.9]   • Depression [F32.9]   • Anemia [D64.9]   • Thrombocytopenia (CMS-HCC) [D69.6]   • Opioid dependence ([facility]) [F11.20]   • Anxiety [F41.9]   • Acidosis [E87.2]   • Obesity (BMI 30-39.9) [E66.9]   • Hypertension [I10]       Quality-Core Measures   Reviewed items::  Labs reviewed    1 ESRD: HD MWF  2 Hypokalemia  3 Anemia   Renal dose all meds  Avoid nephrotoxins

## 2018-06-08 NOTE — PROGRESS NOTES
Admitted by colleague this morning . Missed HD.  Severe met acidosis . Post HD- will continue Nacarb gtt for now. Fu SHIRA . ariel RN plan of care. Nephro to provide additional input.

## 2018-06-08 NOTE — PROGRESS NOTES
Two RN Skin Assessment:    Patient's skin was assessed under all medical devices and over all areas of bony prominence. No areas of skin breakdown were noted. Left arm bruise.

## 2018-06-08 NOTE — PROGRESS NOTES
0645 Received report, introduced self to pt, reviewed labs, orders, allergies, code status    1000 pt taken to dialysis    1400 pt returns from dialysis, medicated for pain and nausea. Given late lunch. Pt then went to bathroom, stated to have thrown up and urinated but threw the urine out of the hat in to toilet, despite pt's awareness of need for urine sample.    1800 pt educated need for sputum sample, specimen cup at bedside. Pt verbalized understanding.

## 2018-06-08 NOTE — CONSULTS
DATE OF SERVICE:  06/07/2018    REQUESTING PHYSICIAN:  Dr. Tima Perry, from the emergency room service.    REASON FOR CONSULTATION:  Management of chronic kidney disease and assist in   the need for urgent dialysis.    HISTORY OF PRESENT ILLNESS:  The patient is an unfortunate 37-year-old lady,   who is well known to our service.  She has a history of lupus, nephritis,   end-stage renal disease, undergoes hemodialysis on Monday, Wednesday, and   Friday at [facility].  The patient presented to the hospital with   generalized weakness and shortness of breath.  She has missed dialysis for 1   week because she said she was feeling too weak to go to dialysis.  Patient has   no fever, no chills.  She does have cough and congestion, also occasional   nausea with decreased p.o. intake.  The patient has been admitted.  She was   found to have uremia with the BUN at 111, creatinine 18.5.  We were called to   manage her kidney disease and assist in the need for urgent dialysis.    The patient had no recent use of NSAIDs or IV contrast.    PAST MEDICAL HISTORY:  Significant for:  1.  End-stage renal disease.  2.  SLE.  3.  Fibromyalgia.  4.  Hypertension.    ALLERGIES:  The list was reviewed.    SOCIAL HISTORY:  Patient had a remote history of smoking.    FAMILY HISTORY:  No known renal disease.    MEDICATIONS:  Reviewed.    REVIEW OF SYSTEMS:  The patient is very drowsy.  She has no hematuria, no   dysuria.  All other review of systems were negative except outlined in the   history of present illness.    PHYSICAL EXAMINATION:  GENERAL:  Patient again is drowsy, but arousable.  She is in no apparent   distress.  VITAL SIGNS:  Showed blood pressure of 182/99, heart rate was 105.  HEENT:  Normocephalic.  Sclerae anicteric.  NECK:  No lymphadenopathy.  CHEST:  Normal.  LUNGS:  Few rales at the bases.  HEART:  S1, S2.  ABDOMEN:  Soft, nontender.  EXTREMITIES:  There is +1 edema.  SKIN:  No skin rashes.  NEUROLOGIC:   Patient is very drowsy.    LABORATORY DATA:  Her recent labs were reviewed.  She also had a chest x-ray,   which I reviewed myself showed possible right-sided pneumonia.    ASSESSMENT AND PLAN:  1.  End-stage renal disease.  2.  Uremia.  3.  Severe metabolic acidosis.  4.  Anemia.  5.  Pneumonia.    PLAN:  1.  We will plan on urgent dialysis to manage her uremia as well as volume   overload.  2.  We will do another dialysis tomorrow to help with her uremia.  3.  Renal dose all medications.  4.  Avoid nephrotoxins.  5.  Start sodium bicarbonate to help with metabolic acidosis.  6.  Check iron panel to rule out iron deficiency anemia.  7.  Start erythropoietin supplement with dialysis.  8.  Prognosis is guarded.    Plan discussed in detail with Dr. Perry, who I would like to thank you for   consulting this very interesting case.       ____________________________________     FADI NAJJAR, MD FN / ISSAC    DD:  06/07/2018 17:01:10  DT:  06/07/2018 17:21:39    D#:  [id number]  Job#:  [id number]

## 2018-06-09 VITALS
HEIGHT: 66 IN | HEART RATE: 88 BPM | OXYGEN SATURATION: 95 % | SYSTOLIC BLOOD PRESSURE: 120 MMHG | WEIGHT: 179.68 LBS | DIASTOLIC BLOOD PRESSURE: 55 MMHG | TEMPERATURE: 97.8 F | BODY MASS INDEX: 28.88 KG/M2 | RESPIRATION RATE: 16 BRPM

## 2018-06-09 LAB
ALBUMIN SERPL BCP-MCNC: 3.8 G/DL (ref 3.2–4.9)
ALBUMIN/GLOB SERPL: 1 G/DL
ALP SERPL-CCNC: 89 U/L (ref 30–99)
ALT SERPL-CCNC: 16 U/L (ref 2–50)
ANION GAP SERPL CALC-SCNC: 15 MMOL/L (ref 0–11.9)
AST SERPL-CCNC: 14 U/L (ref 12–45)
BACTERIA UR CULT: NORMAL
BASOPHILS # BLD AUTO: 0.9 % (ref 0–1.8)
BASOPHILS # BLD: 0.04 K/UL (ref 0–0.12)
BILIRUB SERPL-MCNC: 0.5 MG/DL (ref 0.1–1.5)
BUN SERPL-MCNC: 40 MG/DL (ref 8–22)
CALCIUM SERPL-MCNC: 8.2 MG/DL (ref 8.5–10.5)
CHLORIDE SERPL-SCNC: 97 MMOL/L (ref 96–112)
CO2 SERPL-SCNC: 24 MMOL/L (ref 20–33)
CREAT SERPL-MCNC: 8.22 MG/DL (ref 0.5–1.4)
EOSINOPHIL # BLD AUTO: 0.15 K/UL (ref 0–0.51)
EOSINOPHIL NFR BLD: 3.5 % (ref 0–6.9)
ERYTHROCYTE [DISTWIDTH] IN BLOOD BY AUTOMATED COUNT: 42.7 FL (ref 35.9–50)
GLOBULIN SER CALC-MCNC: 3.9 G/DL (ref 1.9–3.5)
GLUCOSE SERPL-MCNC: 89 MG/DL (ref 65–99)
HCT VFR BLD AUTO: 33.3 % (ref 37–47)
HGB BLD-MCNC: 11.3 G/DL (ref 12–16)
IMM GRANULOCYTES # BLD AUTO: 0.03 K/UL (ref 0–0.11)
IMM GRANULOCYTES NFR BLD AUTO: 0.7 % (ref 0–0.9)
LYMPHOCYTES # BLD AUTO: 1.59 K/UL (ref 1–4.8)
LYMPHOCYTES NFR BLD: 37.1 % (ref 22–41)
MCH RBC QN AUTO: 30.6 PG (ref 27–33)
MCHC RBC AUTO-ENTMCNC: 33.9 G/DL (ref 33.6–35)
MCV RBC AUTO: 90.2 FL (ref 81.4–97.8)
MONOCYTES # BLD AUTO: 0.79 K/UL (ref 0–0.85)
MONOCYTES NFR BLD AUTO: 18.4 % (ref 0–13.4)
NEUTROPHILS # BLD AUTO: 1.69 K/UL (ref 2–7.15)
NEUTROPHILS NFR BLD: 39.4 % (ref 44–72)
NRBC # BLD AUTO: 0 K/UL
NRBC BLD-RTO: 0 /100 WBC
PLATELET # BLD AUTO: 135 K/UL (ref 164–446)
PMV BLD AUTO: 11.4 FL (ref 9–12.9)
POTASSIUM SERPL-SCNC: 3.8 MMOL/L (ref 3.6–5.5)
PROCALCITONIN SERPL-MCNC: 1.37 NG/ML
PROT SERPL-MCNC: 7.7 G/DL (ref 6–8.2)
RBC # BLD AUTO: 3.69 M/UL (ref 4.2–5.4)
SIGNIFICANT IND 70042: NORMAL
SITE SITE: NORMAL
SODIUM SERPL-SCNC: 136 MMOL/L (ref 135–145)
SOURCE SOURCE: NORMAL
WBC # BLD AUTO: 4.3 K/UL (ref 4.8–10.8)

## 2018-06-09 PROCEDURE — 700102 HCHG RX REV CODE 250 W/ 637 OVERRIDE(OP): Performed by: HOSPITALIST

## 2018-06-09 PROCEDURE — A9270 NON-COVERED ITEM OR SERVICE: HCPCS | Performed by: HOSPITALIST

## 2018-06-09 PROCEDURE — 80053 COMPREHEN METABOLIC PANEL: CPT

## 2018-06-09 PROCEDURE — 85025 COMPLETE CBC W/AUTO DIFF WBC: CPT

## 2018-06-09 PROCEDURE — 700105 HCHG RX REV CODE 258: Performed by: HOSPITALIST

## 2018-06-09 PROCEDURE — 700111 HCHG RX REV CODE 636 W/ 250 OVERRIDE (IP): Performed by: HOSPITALIST

## 2018-06-09 PROCEDURE — 90935 HEMODIALYSIS ONE EVALUATION: CPT

## 2018-06-09 PROCEDURE — 90935 HEMODIALYSIS ONE EVALUATION: CPT | Performed by: INTERNAL MEDICINE

## 2018-06-09 PROCEDURE — 84145 PROCALCITONIN (PCT): CPT

## 2018-06-09 PROCEDURE — 700111 HCHG RX REV CODE 636 W/ 250 OVERRIDE (IP)

## 2018-06-09 PROCEDURE — 5A1D70Z PERFORMANCE OF URINARY FILTRATION, INTERMITTENT, LESS THAN 6 HOURS PER DAY: ICD-10-PCS | Performed by: INTERNAL MEDICINE

## 2018-06-09 PROCEDURE — 99239 HOSP IP/OBS DSCHRG MGMT >30: CPT | Performed by: HOSPITALIST

## 2018-06-09 RX ORDER — DOXYCYCLINE 100 MG/1
100 TABLET ORAL EVERY 12 HOURS
Qty: 16 TAB | Refills: 0 | Status: SHIPPED | OUTPATIENT
Start: 2018-06-09 | End: 2018-06-17

## 2018-06-09 RX ORDER — AMOXICILLIN AND CLAVULANATE POTASSIUM 500; 125 MG/1; MG/1
1 TABLET, FILM COATED ORAL DAILY
Qty: 8 TAB | Refills: 0 | Status: SHIPPED | OUTPATIENT
Start: 2018-06-09 | End: 2018-07-20

## 2018-06-09 RX ORDER — HEPARIN SODIUM 1000 [USP'U]/ML
INJECTION, SOLUTION INTRAVENOUS; SUBCUTANEOUS
Status: COMPLETED
Start: 2018-06-09 | End: 2018-06-09

## 2018-06-09 RX ORDER — AMOXICILLIN AND CLAVULANATE POTASSIUM 875; 125 MG/1; MG/1
1 TABLET, FILM COATED ORAL 2 TIMES DAILY
Qty: 16 TAB | Refills: 0 | Status: SHIPPED | OUTPATIENT
Start: 2018-06-09 | End: 2018-06-09

## 2018-06-09 RX ORDER — DOXYCYCLINE 100 MG/1
100 CAPSULE ORAL 2 TIMES DAILY
Qty: 16 CAP | Refills: 0 | Status: SHIPPED | OUTPATIENT
Start: 2018-06-09 | End: 2018-06-17

## 2018-06-09 RX ADMIN — HEPARIN SODIUM 5000 UNITS: 5000 INJECTION, SOLUTION INTRAVENOUS; SUBCUTANEOUS at 06:00

## 2018-06-09 RX ADMIN — GUAIFENESIN 600 MG: 600 TABLET, EXTENDED RELEASE ORAL at 08:58

## 2018-06-09 RX ADMIN — FERROUS SULFATE TAB 325 MG (65 MG ELEMENTAL FE) 325 MG: 325 (65 FE) TAB at 08:58

## 2018-06-09 RX ADMIN — HEPARIN SODIUM 1500 UNITS: 1000 INJECTION, SOLUTION INTRAVENOUS; SUBCUTANEOUS at 12:21

## 2018-06-09 RX ADMIN — PROMETHAZINE HYDROCHLORIDE 25 MG: 25 TABLET ORAL at 11:56

## 2018-06-09 RX ADMIN — BUPROPION HYDROCHLORIDE 150 MG: 150 TABLET, EXTENDED RELEASE ORAL at 08:58

## 2018-06-09 RX ADMIN — STANDARDIZED SENNA CONCENTRATE AND DOCUSATE SODIUM 2 TABLET: 8.6; 5 TABLET, FILM COATED ORAL at 08:58

## 2018-06-09 RX ADMIN — PREGABALIN 150 MG: 75 CAPSULE ORAL at 15:39

## 2018-06-09 RX ADMIN — DOXYCYCLINE 100 MG: 100 TABLET ORAL at 08:58

## 2018-06-09 RX ADMIN — PREGABALIN 150 MG: 75 CAPSULE ORAL at 08:58

## 2018-06-09 RX ADMIN — OXYCODONE HYDROCHLORIDE 20 MG: 10 TABLET ORAL at 11:56

## 2018-06-09 RX ADMIN — CEFTRIAXONE 2 G: 2 INJECTION, POWDER, FOR SOLUTION INTRAMUSCULAR; INTRAVENOUS at 09:05

## 2018-06-09 RX ADMIN — HEPARIN 500 UNITS: 100 SYRINGE at 16:30

## 2018-06-09 RX ADMIN — OXYCODONE HYDROCHLORIDE 20 MG: 10 TABLET ORAL at 04:08

## 2018-06-09 RX ADMIN — OXYCODONE HYDROCHLORIDE 20 MG: 10 TABLET ORAL at 15:39

## 2018-06-09 RX ADMIN — SEVELAMER CARBONATE 2400 MG: 800 TABLET, FILM COATED ORAL at 08:58

## 2018-06-09 ASSESSMENT — PAIN SCALES - GENERAL
PAINLEVEL_OUTOF10: 7
PAINLEVEL_OUTOF10: 8
PAINLEVEL_OUTOF10: 2

## 2018-06-09 ASSESSMENT — ENCOUNTER SYMPTOMS
NAUSEA: 0
SHORTNESS OF BREATH: 1
COUGH: 0
VOMITING: 0

## 2018-06-09 NOTE — PROGRESS NOTES
[facility] Medicine Progress Note, Adult, Complex               Author: Fadi Najjar Date & Time created: 6/9/2018  12:34 PM     Interval History:  Pt with ESRD, presented with weakness, missed her HD as outpt.  Seen and examined while getting HD.    Review of Systems:  Review of Systems   Respiratory: Positive for shortness of breath. Negative for cough.    Cardiovascular: Negative for chest pain and leg swelling.   Gastrointestinal: Negative for nausea and vomiting.   Genitourinary: Negative for dysuria and urgency.       Physical Exam:  Physical Exam   Constitutional: She is oriented to person, place, and time.   HENT:   Right Ear: External ear normal.   Left Ear: External ear normal.   Nose: Nose normal.   Cardiovascular: Normal rate and regular rhythm.    No murmur heard.  Pulmonary/Chest: Effort normal.   Musculoskeletal: She exhibits no edema.   Neurological: She is alert and oriented to person, place, and time.   Nursing note and vitals reviewed.      Labs:        Invalid input(s): [id number]      Recent Labs      06/07/18 1445 06/08/18 0434 06/09/18 0345   SODIUM  136  139  136   POTASSIUM  3.1*  3.2*  3.8   CHLORIDE  96  99  97   CO2  22  23  24   BUN  48*  58*  40*   CREATININE  9.55*  11.40*  8.22*   CALCIUM  9.0  7.9*  8.2*     Recent Labs      06/07/18 0229 06/07/18 1445 06/08/18 0434 06/09/18 0345   ALTSGPT  15   --   14  16   ASTSGOT  14   --   15  14   ALKPHOSPHAT  105*   --   92  89   TBILIRUBIN  0.6   --   0.5  0.5   GLUCOSE  90  91  89  89     Recent Labs      06/07/18 0229 06/09/18 0345   RBC  3.04*  3.69*   HEMOGLOBIN  9.5*  11.3*   HEMATOCRIT  28.7*  33.3*   PLATELETCT  105*  135*   PROTHROMBTM  16.4*   --    APTT  75.4*   --    INR  1.35*   --      Recent Labs      06/07/18 0229 06/08/18 0434 06/09/18 0345   WBC  9.6   --   4.3*   NEUTSPOLYS  75.30*   --   39.40*   LYMPHOCYTES  13.90*   --   37.10   MONOCYTES  8.20   --   18.40*   EOSINOPHILS  1.60   --   3.50    BASOPHILS  0.40   --   0.90   ASTSGOT  14  15  14   ALTSGPT  15  14  16   ALKPHOSPHAT  105*  92  89   TBILIRUBIN  0.6  0.5  0.5           Hemodynamics:  Temp (24hrs), Av.4 °C (97.6 °F), Min:36.2 °C (97.1 °F), Max:36.6 °C (97.9 °F)  Temperature: 36.6 °C (97.8 °F)  Pulse  Av.3  Min: 68  Max: 108   Blood Pressure: 112/62     Respiratory:    Respiration: 18, Pulse Oximetry: 92 %     Work Of Breathing / Effort: Mild  RUL Breath Sounds: Clear, RML Breath Sounds: Diminished, RLL Breath Sounds: Diminished, LASHANDA Breath Sounds: Clear, LLL Breath Sounds: Diminished  Fluids:    Intake/Output Summary (Last 24 hours) at 18 1234  Last data filed at 18 0414   Gross per 24 hour   Intake              400 ml   Output                0 ml   Net              400 ml        GI/Nutrition:  Orders Placed This Encounter   Procedures   • Diet Order     Standing Status:   Standing     Number of Occurrences:   1     Order Specific Question:   Diet:     Answer:   Renal [8]     Medical Decision Making, by Problem:  Active Hospital Problems    Diagnosis   • *Sepsis due to pneumonia (HCC) [J18.9, A41.9]   • ESRD (end stage renal disease) on dialysis ([facility]) [N18.6, Z99.2]   • Lupus (systemic lupus erythematosus) (HCC) [M32.9]   • Depression [F32.9]   • Anemia [D64.9]   • Thrombocytopenia (CMS-HCC) [D69.6]   • Opioid dependence (HCC) [F11.20]   • Anxiety [F41.9]   • Acidosis [E87.2]   • Obesity (BMI 30-39.9) [E66.9]   • Hypertension [I10]       Quality-Core Measures   Reviewed items::  Labs reviewed    1 ESRD: HD   2 Hypokalemia  3 Anemia   Renal dose all meds  Avoid nephrotoxins  D/W Dr Mack

## 2018-06-09 NOTE — CARE PLAN
Problem: Communication  Goal: The ability to communicate needs accurately and effectively will improve  Outcome: PROGRESSING AS EXPECTED  Patient able to make needs known    Problem: Venous Thromboembolism (VTW)/Deep Vein Thrombosis (DVT) Prevention:  Goal: Patient will participate in Venous Thrombosis (VTE)/Deep Vein Thrombosis (DVT)Prevention Measures  Outcome: PROGRESSING AS EXPECTED  Patient receiving heparin injections as ordered

## 2018-06-09 NOTE — DISCHARGE INSTRUCTIONS
Discharge Instructions    Discharged to home by car with relative. Discharged via wheelchair, hospital escort: Yes.  Special equipment needed: Not Applicable    Be sure to schedule a follow-up appointment with your primary care doctor or any specialists as instructed.     Discharge Plan:   Diet Plan: Discussed  Activity Level: Discussed  Confirmed Follow up Appointment: Patient to Call and Schedule Appointment  Confirmed Symptoms Management: Discussed  Medication Reconciliation Updated: Yes  Pneumococcal Vaccine Administered/Refused: Not given - Patient refused pneumococcal vaccine  Influenza Vaccine Indication: Not indicated: Previously immunized this influenza season and > 8 years of age    I understand that a diet low in cholesterol, fat, and sodium is recommended for good health. Unless I have been given specific instructions below for another diet, I accept this instruction as my diet prescription.   Other diet: high fiber plant based low protein    Special Instructions: None    · Is patient discharged on Warfarin / Coumadin?   No     Depression / Suicide Risk    As you are discharged from this [facility] facility, it is important to learn how to keep safe from harming yourself.    Recognize the warning signs:  · Abrupt changes in personality, positive or negative- including increase in energy   · Giving away possessions  · Change in eating patterns- significant weight changes-  positive or negative  · Change in sleeping patterns- unable to sleep or sleeping all the time   · Unwillingness or inability to communicate  · Depression  · Unusual sadness, discouragement and loneliness  · Talk of wanting to die  · Neglect of personal appearance   · Rebelliousness- reckless behavior  · Withdrawal from people/activities they love  · Confusion- inability to concentrate     If you or a loved one observes any of these behaviors or has concerns about self-harm, here's what you can do:  · Talk about it- your feelings and  reasons for harming yourself  · Remove any means that you might use to hurt yourself (examples: pills, rope, extension cords, firearm)  · Get professional help from the community (Mental Health, Substance Abuse, psychological counseling)  · Do not be alone:Call your Safe Contact- someone whom you trust who will be there for you.  · Call your local CRISIS HOTLINE 711-0355 or 882-648-7079  · Call your local Children's Mobile Crisis Response Team [place] (187) 072-2594 or [employer]  · Call the toll free National Suicide Prevention Hotlines   · National Suicide Prevention Lifeline 757-361-OUKK (8131)  · National Hope Line Network 800-SUICIDE (434-9322)    High-Fiber Diet  Fiber, also called dietary fiber, is a type of carbohydrate found in fruits, vegetables, whole grains, and beans. A high-fiber diet can have many health benefits. Your health care provider may recommend a high-fiber diet to help:  · Prevent constipation. Fiber can make your bowel movements more regular.  · Lower your cholesterol.  · Relieve hemorrhoids, uncomplicated diverticulosis, or irritable bowel syndrome.  · Prevent overeating as part of a weight-loss plan.  · Prevent heart disease, type 2 diabetes, and certain cancers.  What is my plan?  The recommended daily intake of fiber includes:  · 38 grams for men under age 50.  · 30 grams for men over age 50.  · 25 grams for women under age 50.  · 21 grams for women over age 50.  You can get the recommended daily intake of dietary fiber by eating a variety of fruits, vegetables, grains, and beans. Your health care provider may also recommend a fiber supplement if it is not possible to get enough fiber through your diet.  What do I need to know about a high-fiber diet?  · Fiber supplements have not been widely studied for their effectiveness, so it is better to get fiber through food sources.  · Always check the fiber content on the nutrition facts label of any prepackaged food. Look for  foods that contain at least 5 grams of fiber per serving.  · Ask your dietitian if you have questions about specific foods that are related to your condition, especially if those foods are not listed in the following section.  · Increase your daily fiber consumption gradually. Increasing your intake of dietary fiber too quickly may cause bloating, cramping, or gas.  · Drink plenty of water. Water helps you to digest fiber.  What foods can I eat?  Grains   Whole-grain breads. Multigrain cereal. Oats and oatmeal. Brown rice. Barley. Bulgur wheat. Millet. Bran muffins. Popcorn. Rye wafer crackers.  Vegetables   Sweet potatoes. Spinach. Kale. Artichokes. Cabbage. Broccoli. Green peas. Carrots. Squash.  Fruits   Berries. Pears. Apples. Oranges. Avocados. Prunes and raisins. Dried figs.  Meats and Other Protein Sources   Navy, kidney, garcia, and soy beans. Split peas. Lentils. Nuts and seeds.  Dairy   Fiber-fortified yogurt.  Beverages   Fiber-fortified soy milk. Fiber-fortified orange juice.  Other   Fiber bars.  The items listed above may not be a complete list of recommended foods or beverages. Contact your dietitian for more options.   What foods are not recommended?  Grains   White bread. Pasta made with refined flour. White rice.  Vegetables   Fried potatoes. Canned vegetables. Well-cooked vegetables.  Fruits   Fruit juice. Cooked, strained fruit.  Meats and Other Protein Sources   Fatty cuts of meat. Fried poultry or fried fish.  Dairy   Milk. Yogurt. Cream cheese. Sour cream.  Beverages   Soft drinks.  Other   Cakes and pastries. Butter and oils.  The items listed above may not be a complete list of foods and beverages to avoid. Contact your dietitian for more information.   What are some tips for including high-fiber foods in my diet?  · Eat a wide variety of high-fiber foods.  · Make sure that half of all grains consumed each day are whole grains.  · Replace breads and cereals made from refined flour or white  flour with whole-grain breads and cereals.  · Replace white rice with brown rice, bulgur wheat, or millet.  · Start the day with a breakfast that is high in fiber, such as a cereal that contains at least 5 grams of fiber per serving.  · Use beans in place of meat in soups, salads, or pasta.  · Eat high-fiber snacks, such as berries, raw vegetables, nuts, or popcorn.  This information is not intended to replace advice given to you by your health care provider. Make sure you discuss any questions you have with your health care provider.  Document Released: 12/18/2006 Document Revised: 05/25/2017 Document Reviewed: 06/02/2015  [employer] Interactive Patient Education © 2017 [employer]

## 2018-06-09 NOTE — PROGRESS NOTES
0645 Received report, introduced self to pt, reviewed labs, orders, allergies, code status  .  1200 pt taken to dialysis    1500 pt arrived back from dialysis. Pt medicated for pain and nausea.    1600 DC education complete, port o cath discontinued, pt verbalized understanding. Pt then wheeled out by this RN, scripts sent to pharmacy. Pt given resources on plant based diet and strides being made in the field of medicine and lupus and treating this disease with diet changes.

## 2018-06-09 NOTE — PROGRESS NOTES
HD treatment were ordered by Dr. Najjar, pt was able to tolerate HD treatment but had an episode of cramping/ heartburn last 13 min of HD treatment. Blood was return and voiced no complaint of and pain. Gave report to Yessica FINCH, held pt AVF site for 10 min each, Yessica FINCH verified that pt AVF site were clean dry and intact, no swelling or redness were noted pot tx. +B/T pre and post tx. Pt was stable post tx, denies any complaint of pain and SOB, no fever or any other distress were noted post tx. Denies any heartburn or chest pain post tx, treatment started at 1213 and ended at 1500. Dr. Najjar was notified that patient was taken off 13 min early due to c/o heartburn. See flow sheets for further details.

## 2018-06-09 NOTE — PROGRESS NOTES
Received report from day shift RN and assumed care.   Patient is A+Ox4, lethargic, flat affect, falls asleep often.  Patient taking oxycodone 20mg PO TID prn.  She is a one assist OOB to the bathroom.  She has a Left chest port running at TKO.  She is on RA and is on a renal diet.  She has a RUE AV Fistula.  Dialysis done today and is scheduled again tomorrow.  POC reviewed with patient.  No other concerns at this time.  Bed is locked and in lowest position.  Treaded socks on, call light and belongings within reach.  Hourly rounding in place.

## 2018-06-10 NOTE — DISCHARGE SUMMARY
Hospital Medicine Discharge Note     Admit Date:  6/7/2018       Discharge Date:   6/9/2018    Attending Physician:  Renny Mack M.D.      Diagnoses (includes active and resolved):     Principal Problem:    Sepsis due to pneumonia ([facility]) POA: Unknown  Active Problems:    Pneumonia POA: Yes    ESRD (end stage renal disease) on dialysis ([facility]) POA: Yes    Lupus (systemic lupus erythematosus) ([facility]) POA: Yes    Anxiety POA: Yes    Opioid dependence ([facility]) POA: Yes    Thrombocytopenia ([facility]) POA: Yes    Anemia POA: Yes    Depression POA: Yes    Hypertension POA: Yes    Obesity (BMI 30-39.9) POA: Yes    Acidosis POA: Unknown    Hospital Summary (Brief Narrative):         Patient 37-year-old female history of end-stage renal disease, lupus, chronic pain on opiates was admitted with symptoms of shortness of breath,  Malaise,  Fatigue,  Cough and sore throat.   She had missed dialysis for 10 days saying that she was too weak to go and did not have someone to help transport.  Chest x-ray revealed right basilar infiltrates she was started on empiric IV Rocephin, azithromycin.  She had severe metabolic acidosis with serum bicarb 9 and anion gap 21.  Blood sugar 90.  She is placed on bicarb drip had emergent hemodialysis with correction of her bicarb to 24.  Following admission she had to hemodialysis x 2 and felt markedly better and was not hypoxic.  Cultures negative.  No fever or white count.  She will be discharged home with re-emphasis on compliance with her dialysis.  In addition complete 8 day course of antibiotics.  Advised follow-up with hemodialysis this Monday as scheduled and primary care provider within 1-2 weeks.      Consultants:        Dr. Stevens nephrology    Imaging/ Testing:      DX-CHEST-PORTABLE (1 VIEW)   Final Result      Minimal right-sided opacities as described above, suggesting pneumonia.                Discharge Medications:             Medication List      START taking these medications       Instructions   amoxicillin-clavulanate 500-125 MG Tabs  Commonly known as:  AUGMENTIN   Take 1 Tab by mouth every day. Take after Hemolysis on days of dialysis  Dose:  1 Tab     * doxycycline monohydrate 100 MG tablet  Commonly known as:  ADOXA   Take 1 Tab by mouth every 12 hours for 8 days.  Dose:  100 mg     * doxycycline 100 MG capsule  Commonly known as:  MONODOX   Take 1 Cap by mouth 2 times a day for 8 days.  Dose:  100 mg        * This list has 2 medication(s) that are the same as other medications prescribed for you. Read the directions carefully, and ask your doctor or other care provider to review them with you.            CONTINUE taking these medications      Instructions   ascorbic acid 500 MG Tabs  Commonly known as:  ascorbic acid   Take 500 mg by mouth every day.  Dose:  500 mg     cholecalciferol 5000 UNIT Caps  Commonly known as:  VITAMIN D3   Take 10,000 Units by mouth every day.  Dose:  39677 Units     cinacalcet 30 MG Tabs  Commonly known as:  SENSIPAR   Take 30 mg by mouth every bedtime.  Dose:  30 mg     ferrous sulfate 325 (65 Fe) MG tablet   Take 325 mg by mouth every day.  Dose:  325 mg     hydroxychloroquine 200 MG Tabs  Commonly known as:  PLAQUENIL   Take 200 mg by mouth every bedtime.  Dose:  200 mg     lidocaine 5 % Ptch  Commonly known as:  LIDODERM   Apply 1 Patch to skin as directed every 24 hours. Apply to back  Dose:  1 Patch     Oxycodone HCl 20 MG Tabs   Take 1 Tab by mouth every 6 hours as needed (Take up to three a day as needed and one additional pill after dialysis (3 times a week)) for up to 30 days.  Dose:  20 mg     pregabalin 100 MG Caps  Commonly known as:  LYRICA   Take 100 mg by mouth 3 times a day.  Dose:  100 mg     promethazine 25 MG Tabs  Commonly known as:  PHENERGAN   Take 25 mg by mouth every 6 hours as needed for Nausea/Vomiting.  Dose:  25 mg     RENVELA 800 MG Tabs tablet  Generic drug:  sevelamer carbonate   Take 2,400 mg by mouth 3 times a day, with  meals.  Dose:  2400 mg     tizanidine 4 MG Tabs  Commonly known as:  ZANAFLEX   Take 2 Tabs by mouth every bedtime.  Dose:  8 mg     traZODone 50 MG Tabs  Commonly known as:  DESYREL   Take 1 Tab by mouth every bedtime.  Dose:  50 mg     WELLBUTRIN  MG XL tablet  Generic drug:  buPROPion   Take 150 mg by mouth every morning.  Dose:  150 mg               Diet:       DIET ORDERS (Through next 24h)    Start     Ordered    06/07/18 0429  Diet Order  ALL MEALS     Question:  Diet:  Answer:  Renal    06/07/18 0429            Activity:   As tolerated.      Code status:   Full code    Primary Care Provider:    Sushila Manzano M.D.    Follow up appointment details :      .  Sushila Manzano M.D.  [address] [place] [id number]  [phone]    In 1 week      HD as scheduled on Monday.           Future Appointments  Date Time Provider Department Center   6/26/2018 10:45 AM Quinn Chandler M.D. [facility] None           Time spent on discharge day patient visit: 40 minutes    #################################################

## 2018-06-11 ENCOUNTER — PATIENT OUTREACH (OUTPATIENT)
Dept: HEALTH INFORMATION MANAGEMENT | Facility: OTHER | Age: 38
End: 2018-06-11

## 2018-06-12 LAB
BACTERIA BLD CULT: NORMAL
SIGNIFICANT IND 70042: NORMAL
SITE SITE: NORMAL
SOURCE SOURCE: NORMAL

## 2018-06-26 ENCOUNTER — OFFICE VISIT (OUTPATIENT)
Dept: PHYSICAL MEDICINE AND REHAB | Facility: MEDICAL CENTER | Age: 38
End: 2018-06-26
Payer: MEDICARE

## 2018-06-26 VITALS
OXYGEN SATURATION: 97 % | SYSTOLIC BLOOD PRESSURE: 130 MMHG | WEIGHT: 186 LBS | BODY MASS INDEX: 30.99 KG/M2 | TEMPERATURE: 98.4 F | HEIGHT: 65 IN | DIASTOLIC BLOOD PRESSURE: 98 MMHG | HEART RATE: 94 BPM

## 2018-06-26 DIAGNOSIS — Z99.2 ESRD (END STAGE RENAL DISEASE) ON DIALYSIS (HCC): ICD-10-CM

## 2018-06-26 DIAGNOSIS — M54.41 MIDLINE LOW BACK PAIN WITH RIGHT-SIDED SCIATICA, UNSPECIFIED CHRONICITY: ICD-10-CM

## 2018-06-26 DIAGNOSIS — G62.9 PERIPHERAL POLYNEUROPATHY: ICD-10-CM

## 2018-06-26 DIAGNOSIS — N18.6 ESRD (END STAGE RENAL DISEASE) ON DIALYSIS (HCC): ICD-10-CM

## 2018-06-26 DIAGNOSIS — F11.90 CHRONIC, CONTINUOUS USE OF OPIOIDS: ICD-10-CM

## 2018-06-26 DIAGNOSIS — M87.052 AVASCULAR NECROSIS OF BONES OF BOTH HIPS (HCC): ICD-10-CM

## 2018-06-26 DIAGNOSIS — M54.6 THORACIC SPINE PAIN: ICD-10-CM

## 2018-06-26 DIAGNOSIS — G89.29 OTHER CHRONIC PAIN: ICD-10-CM

## 2018-06-26 DIAGNOSIS — M87.051 AVASCULAR NECROSIS OF BONES OF BOTH HIPS (HCC): ICD-10-CM

## 2018-06-26 PROCEDURE — 99214 OFFICE O/P EST MOD 30 MIN: CPT | Performed by: PHYSICAL MEDICINE & REHABILITATION

## 2018-06-26 RX ORDER — OXYCODONE HYDROCHLORIDE 20 MG/1
20 TABLET ORAL EVERY 6 HOURS PRN
Qty: 102 TAB | Refills: 0 | Status: SHIPPED | OUTPATIENT
Start: 2018-06-26 | End: 2018-07-26

## 2018-06-26 ASSESSMENT — PAIN SCALES - GENERAL: PAINLEVEL: 6=MODERATE PAIN

## 2018-06-26 NOTE — PROGRESS NOTES
Follow up patient note  Pain Medicine, Interventional spine and sports physiatry, Physical medicine rehabilitation      Chief complaint:   Cervicalgia, hips and knees, bilateral leg pain      HISTORY      HPI  Patient identification/Interval histotry: Debby Carrizales 37 y.o. female who has chronic pain related to rheumatologic disease, peripheral neuropathy and AVN hips, lupus.  She reports that she has not been getting out of the house much except for dialysis.  She is here with her grandmother today.  It does sound like she has been trying to do a few social things with friends, but overall, does not spend much time out and feels like it makes it easy to focus on her pain.      She does feel that the oxycodone after dialysis has helped to improve her pain control, but she still has nearly constant pain.  No side effects from medications, mild constipation.  She is going to get some colace.  Pain is aching in the shoulders, burning in the hips, legs and feet.  Overall this is stable.  She has been in the hospital since the last visit, for pneumonia, she reports.    She also would like to talk about thoracic spine pain that has been a chronic problem, but has been worse lately.  This sort of pain has been present for years and she has felt that her breasts may have something to do with it.  No radicular symptoms.       ROS Red Flags :   Chills, Sweats: Denies, no recent fevers  Involuntary Weight Loss: Denies  Bowel/Bladder Incontinence: Denies  Saddle Anesthesia: Denies    PMHx:   Past Medical History:   Diagnosis Date   • Arthritis     all joints,r/t lupus   • Avascular necrosis of bones of both hips (HCC) 10/10/2016   • Clostridium difficile colitis 5/3/2011   • Dialysis patient    • ESBL (extended spectrum beta-lactamase) producing bacteria infection 8/25/2014   • Fibromyalgia    • Hypertension    • Lupus    • Pneumonia    • Psychiatric disorder     anxiety, depression   • Pyelonephritis  11/21/2017   • Renal failure        PSHx:   Past Surgical History:   Procedure Laterality Date   • AV FISTULA CREATION Right 12/9/2017    Procedure: AV FISTULA CREATION-ARM FOR GRAFT;  Surgeon: Lesly Jansen M.D.;  Location: [facility];  Service: General   • THROMBECTOMY  12/9/2017    Procedure: THROMBECTOMY;  Surgeon: Lesly Jansen M.D.;  Location: SURGERY [facility];  Service: General   • THROMBECTOMY Right 8/21/2016    Procedure: THROMBECTOMY - right AV fistula graft with grams;  Surgeon: Shabbir Ardon M.D.;  Location: SURGERY [facility];  Service:    • ANGIOPLASTY BALLOON  8/21/2016    Procedure: ANGIOPLASTY BALLOON;  Surgeon: Shabbir Ardon M.D.;  Location: [facility];  Service:    • THROMBECTOMY Right 8/20/2016    Procedure: THROMBECTOMY AV GRAFT;  Surgeon: Shabbir Ardon M.D.;  Location: [facility];  Service:    • AV FISTULA CREATION Right 7/12/2016    Procedure: AV FISTULA CREATION WITH GRAFT BRACHIAL AXILLARY;  Surgeon: Shabbir Ardon M.D.;  Location: SURGERY [facility];  Service:    • CLOSED REDUCTION Right 7/5/2016    Procedure: CLOSED REDUCTION- Hip ;  Surgeon: Michael Holman M.D.;  Location: [facility];  Service:    • HIP ARTHROPLASTY TOTAL Right 1/18/2016    Procedure: HIP ARTHROPLASTY TOTAL;  Surgeon: Michael Holman M.D.;  Location: [facility];  Service:    • AV FISTULA CREATION  2/3/2015    Performed by Shabbir Ardon M.D. at SURGERY [facility]   • AV FISTULA CREATION  11/14/2014    Performed by Shabbir Ardon M.D. at SURGERY [facility]   • AV FISTULA CREATION  9/9/2014    Performed by Shabbir Ardon M.D. at [facility]   • CATH PLACEMENT  9/9/2014    Performed by Shabbir Ardon M.D. at [facility]   • OTHER  5/2011    tracheostomy   • GASTROSCOPY-ENDO  4/27/2011    Performed by PALMIRA DOAN at ENDOSCOPY [facility]   • COLONOSCOPY WITH BIOPSY   4/20/2011    Performed by ALCIRA CRUZ at ENDOSCOPY [facility]   • GASTROSCOPY-ENDO  4/18/2011    Performed by ALCIRA CRUZ at ENDOSCOPY [facility]   • GASTROSCOPY-ENDO  4/10/2011    Performed by SYED JURADO at ENDOSCOPY [facility]   • SCLEROTHERAPHY  4/10/2011    Performed by SYED JURADO at ENDOSCOPY [facility]   • OTHER ABDOMINAL SURGERY      kidney biopsy   • TRACHEOSTOMY         Family history   Denies neuromuscular disease  Family History   Problem Relation Age of Onset   • Heart Disease Mother    • Cancer Father        Medications:   Current Outpatient Prescriptions   Medication   • Oxycodone HCl 20 MG Tab   • pregabalin (LYRICA) 100 MG Cap   • cinacalcet (SENSIPAR) 30 MG Tab   • sevelamer carbonate (RENVELA) 800 MG Tab tablet   • lidocaine (LIDODERM) 5 % Patch   • ferrous sulfate 325 (65 FE) MG tablet   • ascorbic acid (ASCORBIC ACID) 500 MG Tab   • buPROPion (WELLBUTRIN XL) 150 MG XL tablet   • hydroxychloroquine (PLAQUENIL) 200 MG Tab   • cholecalciferol (VITAMIN D3) 5000 UNIT Cap   • trazodone (DESYREL) 50 MG Tab   • tizanidine (ZANAFLEX) 4 MG Tab   • amoxicillin-clavulanate (AUGMENTIN) 500-125 MG Tab   • promethazine (PHENERGAN) 25 MG Tab     No current facility-administered medications for this visit.        Allergies:   Allergies   Allergen Reactions   • Ativan Anxiety     Patient becomes severely paranoid and agitated   • Morphine Itching     Tolerates Dilaudid   • Seasonal Runny Nose and Itching     Hay fever, sabiha brush       Social Hx:   Social History     Social History   • Marital status: Single     Spouse name: N/A   • Number of children: N/A   • Years of education: N/A     Occupational History   • Not on file.     Social History Main Topics   • Smoking status: Former Smoker     Packs/day: 0.50     Years: 18.00     Types: Cigarettes     Quit date: 6/13/2011   • Smokeless tobacco: Never Used      Comment: 1/2 ppd   • Alcohol use No   • Drug use:  "No   • Sexual activity: Not on file     Other Topics Concern   •  Service No   • Blood Transfusions Yes   • Caffeine Concern No   • Occupational Exposure No   • Hobby Hazards No   • Sleep Concern Yes   • Stress Concern Yes   • Weight Concern Yes   • Special Diet Yes   • Back Care No   • Exercise Yes   • Bike Helmet No   • Seat Belt Yes   • Self-Exams Yes     Social History Narrative   • No narrative on file       EXAMINATION     Physical Exam:   Vitals: Blood pressure 130/98, pulse 94, temperature 36.9 °C (98.4 °F), height 1.651 m (5' 5\"), weight 84.4 kg (186 lb), SpO2 97 %.    Constitutional:   Body Habitus: Body mass index is 30.95 kg/m².  Cooperation: Fully cooperates with exam  Appearance: Well-groomed no disheveled, in no acute distress    Respiratory-  breathing comfortable on room air, no wheezing.  Cardiovascular- No pitting edema noted in the lower extremities bilaterally  Psychiatric- alert and oriented ×3. Normal affect.   Spine:  No focal motor deficits in the lower extremities bilaterally.  Sensation is intact to light touch bilaterally but with some hyperesthesias distally.  Reflexes are 2+ patella and 1+ achilles bilaterally.  Gait steady without loss of balance.      MEDICAL DECISION MAKING    DATA    Labs:   06/09/2018 TSH normal    Lab Results   Component Value Date/Time    HBA1C 4.9 06/07/2018 02:29 AM          Imaging:     I reviewed the following radiology reports       Chest xray 06/07/2018: Minimal right-sided opacities as described above, suggesting pneumonia.               Results for orders placed during the hospital encounter of 07/19/17   MR-LUMBAR SPINE-W/O    Impression 1.  Diffuse decreased bone marrow signal intensity throughout the lumbar spine and sacrum, presumably secondary to chronic anemia.    2.  Otherwise unremarkable MRI scan of the lumbar spine without contrast.                                    DIAGNOSIS   Visit Diagnoses     ICD-10-CM   1. Midline low back pain " with right-sided sciatica, unspecified chronicity M54.41   2. Avascular necrosis of bones of both hips ([facility]) M87.051    M87.052   3. Chronic, continuous use of opioids F11.90   4. ESRD (end stage renal disease) on dialysis ([facility]) N18.6    Z99.2   5. Peripheral polyneuropathy ([facility]) G62.9   6. Other chronic pain G89.29   7. Thoracic spine pain M54.6         ASSESSMENT and PLAN:     Debby Carrizales 37 y.o. Female returns for follow-up of her chronic pain related to lupus, AVN hips, low back and peripheral neuropathy    Debby was seen today for follow-up.    Diagnoses and all orders for this visit:    Midline low back pain with right-sided sciatica, unspecified chronicity    Avascular necrosis of bones of both hips ([facility])  -     Oxycodone HCl 20 MG Tab; Take 1 Tab by mouth every 6 hours as needed (Take up to three a day as needed and one additional pill after dialysis (3 times a week)) for up to 30 days.  -     REFERRAL TO BEHAVIORAL HEALTH    Chronic, continuous use of opioids    ESRD (end stage renal disease) on dialysis ([facility])  -     Oxycodone HCl 20 MG Tab; Take 1 Tab by mouth every 6 hours as needed (Take up to three a day as needed and one additional pill after dialysis (3 times a week)) for up to 30 days.  -     REFERRAL TO BEHAVIORAL [facility]    Peripheral polyneuropathy ([facility])  -     Oxycodone HCl 20 MG Tab; Take 1 Tab by mouth every 6 hours as needed (Take up to three a day as needed and one additional pill after dialysis (3 times a week)) for up to 30 days.  -     REFERRAL TO BEHAVIORAL HEALTH    Other chronic pain  -     Oxycodone HCl 20 MG Tab; Take 1 Tab by mouth every 6 hours as needed (Take up to three a day as needed and one additional pill after dialysis (3 times a week)) for up to 30 days.  -     REFERRAL TO BEHAVIORAL HEALTH    Thoracic spine pain  -     DX-THORACIC SPINE-WITH SWIMMERS VIEW; Future    We discussed that she has done well with the current medication routine and finds taking one  extra dose after dialysis on those days has helped a lot.  No change to medication routine.  She is tolerating this reasonably well.  However, we have discussed referral to the chronic pain mind-body group led by Dr. Farrar.  I believe that she would benefit greatly from this program and she would like to participate in a once a week program.      We also reviewed progressive muscle relaxation techniques for her to try at home.    Xrays of the thoracic spine ordered.  Thoracic spine is not as well-visualized on the recent chest xray as we will see on dedicated study.    Follow up: 1 month    Thank you for allowing me to participate in the care of this patient. If you have any questions please not hesitate to contact me.      Please note that this dictation was created using voice recognition software. I have made every reasonable attempt to correct obvious errors but there may be errors of grammar and content that I may have overlooked prior to finalization of this note.      Quinn Chandler MD  Interventional Spine and Sports Physiatry  Physical Medicine and Rehabilitation  [facility]

## 2018-07-09 ENCOUNTER — PATIENT OUTREACH (OUTPATIENT)
Dept: HEALTH INFORMATION MANAGEMENT | Facility: OTHER | Age: 38
End: 2018-07-09

## 2018-07-09 NOTE — DISCHARGE PLANNING
Medical Social Work    Referral: Pt discussed at IDT rounds this AM.    Intervention: Per flowsheet, pt lives alone and expects to d.c home.  Pt has not been compliant with dialysis or medications in 3 weeks.  No SS needs identified nor any requests for KLAUDIA Villavicencio during rounds.      Plan: SW available for any assistance with d.c planning.     Patient/Caregiver provided printed discharge information.

## 2018-07-12 NOTE — PROGRESS NOTES
Darya Carrizales was an emergent admission who discharged on 6/9. Patient advocate was unsuccessful in making contact with the patient. Patient advocate confirmed that the patient had picked up her discharge medications. IHD also reached out to her physicians office requesting that they reach out to the patient to schedule an appointment for her hospital follow up. Patient did keep appointments with Physical Rehabilition and Medicine on 6/26 and has a follow up scheduled for 7/10.

## 2018-07-19 ENCOUNTER — HOSPITAL ENCOUNTER (INPATIENT)
Facility: MEDICAL CENTER | Age: 38
LOS: 8 days | DRG: 193 | End: 2018-07-28
Attending: EMERGENCY MEDICINE | Admitting: HOSPITALIST
Payer: MEDICARE

## 2018-07-19 DIAGNOSIS — M79.7 FIBROMYALGIA: ICD-10-CM

## 2018-07-19 DIAGNOSIS — A41.9 SEPSIS DUE TO PNEUMONIA (HCC): ICD-10-CM

## 2018-07-19 DIAGNOSIS — R07.9 LEFT SIDED CHEST PAIN: ICD-10-CM

## 2018-07-19 DIAGNOSIS — M54.50 ACUTE BILATERAL LOW BACK PAIN WITHOUT SCIATICA: ICD-10-CM

## 2018-07-19 DIAGNOSIS — D64.9 CHRONIC ANEMIA: ICD-10-CM

## 2018-07-19 DIAGNOSIS — R06.00 DYSPNEA, UNSPECIFIED TYPE: ICD-10-CM

## 2018-07-19 DIAGNOSIS — J18.9 PNEUMONIA OF LEFT LOWER LOBE DUE TO INFECTIOUS ORGANISM: ICD-10-CM

## 2018-07-19 DIAGNOSIS — J18.9 SEPSIS DUE TO PNEUMONIA (HCC): ICD-10-CM

## 2018-07-19 DIAGNOSIS — Z99.2 DIALYSIS PATIENT (HCC): ICD-10-CM

## 2018-07-19 DIAGNOSIS — J90 PLEURAL EFFUSION ON LEFT: ICD-10-CM

## 2018-07-19 LAB — EKG IMPRESSION: NORMAL

## 2018-07-19 PROCEDURE — 93005 ELECTROCARDIOGRAM TRACING: CPT | Performed by: EMERGENCY MEDICINE

## 2018-07-19 PROCEDURE — 93005 ELECTROCARDIOGRAM TRACING: CPT

## 2018-07-19 PROCEDURE — 99285 EMERGENCY DEPT VISIT HI MDM: CPT

## 2018-07-19 ASSESSMENT — PAIN SCALES - GENERAL: PAINLEVEL_OUTOF10: 9

## 2018-07-20 ENCOUNTER — APPOINTMENT (OUTPATIENT)
Dept: RADIOLOGY | Facility: MEDICAL CENTER | Age: 38
DRG: 193 | End: 2018-07-20
Attending: EMERGENCY MEDICINE
Payer: MEDICARE

## 2018-07-20 PROBLEM — J90 PLEURAL EFFUSION ON LEFT: Status: ACTIVE | Noted: 2018-07-20

## 2018-07-20 LAB
ALBUMIN SERPL BCP-MCNC: 3.8 G/DL (ref 3.2–4.9)
ALBUMIN/GLOB SERPL: 1.3 G/DL
ALP SERPL-CCNC: 95 U/L (ref 30–99)
ALT SERPL-CCNC: 5 U/L (ref 2–50)
ANION GAP SERPL CALC-SCNC: 14 MMOL/L (ref 0–11.9)
APPEARANCE UR: CLEAR
APTT PPP: 60 SEC (ref 24.7–36)
AST SERPL-CCNC: 8 U/L (ref 12–45)
BACTERIA #/AREA URNS HPF: NEGATIVE /HPF
BASOPHILS # BLD AUTO: 0.7 % (ref 0–1.8)
BASOPHILS # BLD: 0.05 K/UL (ref 0–0.12)
BILIRUB SERPL-MCNC: 0.5 MG/DL (ref 0.1–1.5)
BILIRUB UR QL STRIP.AUTO: NEGATIVE
BUN SERPL-MCNC: 43 MG/DL (ref 8–22)
C3 SERPL-MCNC: 100 MG/DL (ref 87–200)
C4 SERPL-MCNC: 32 MG/DL (ref 19–52)
CALCIUM SERPL-MCNC: 7.6 MG/DL (ref 8.5–10.5)
CHLORIDE SERPL-SCNC: 101 MMOL/L (ref 96–112)
CO2 SERPL-SCNC: 20 MMOL/L (ref 20–33)
COLOR UR: YELLOW
CREAT SERPL-MCNC: 10.87 MG/DL (ref 0.5–1.4)
DEPRECATED D DIMER PPP IA-ACNC: 928 NG/ML(D-DU)
EOSINOPHIL # BLD AUTO: 0.28 K/UL (ref 0–0.51)
EOSINOPHIL NFR BLD: 4.2 % (ref 0–6.9)
EPI CELLS #/AREA URNS HPF: NORMAL /HPF
ERYTHROCYTE [DISTWIDTH] IN BLOOD BY AUTOMATED COUNT: 48.7 FL (ref 35.9–50)
GLOBULIN SER CALC-MCNC: 2.9 G/DL (ref 1.9–3.5)
GLUCOSE SERPL-MCNC: 80 MG/DL (ref 65–99)
GLUCOSE UR STRIP.AUTO-MCNC: 100 MG/DL
HAV IGM SERPL QL IA: NEGATIVE
HBV CORE IGM SER QL: NEGATIVE
HBV SURFACE AG SER QL: NEGATIVE
HCG SERPL QL: NEGATIVE
HCT VFR BLD AUTO: 31 % (ref 37–47)
HCV AB SER QL: NEGATIVE
HGB BLD-MCNC: 9.8 G/DL (ref 12–16)
HYALINE CASTS #/AREA URNS LPF: NORMAL /LPF
IMM GRANULOCYTES # BLD AUTO: 0.04 K/UL (ref 0–0.11)
IMM GRANULOCYTES NFR BLD AUTO: 0.6 % (ref 0–0.9)
INR PPP: 1.22 (ref 0.87–1.13)
KETONES UR STRIP.AUTO-MCNC: NEGATIVE MG/DL
LEUKOCYTE ESTERASE UR QL STRIP.AUTO: NEGATIVE
LYMPHOCYTES # BLD AUTO: 1.23 K/UL (ref 1–4.8)
LYMPHOCYTES NFR BLD: 18.4 % (ref 22–41)
MCH RBC QN AUTO: 30.2 PG (ref 27–33)
MCHC RBC AUTO-ENTMCNC: 31.6 G/DL (ref 33.6–35)
MCV RBC AUTO: 95.7 FL (ref 81.4–97.8)
MICRO URNS: ABNORMAL
MONOCYTES # BLD AUTO: 0.65 K/UL (ref 0–0.85)
MONOCYTES NFR BLD AUTO: 9.7 % (ref 0–13.4)
NEUTROPHILS # BLD AUTO: 4.45 K/UL (ref 2–7.15)
NEUTROPHILS NFR BLD: 66.4 % (ref 44–72)
NITRITE UR QL STRIP.AUTO: NEGATIVE
NRBC # BLD AUTO: 0 K/UL
NRBC BLD-RTO: 0 /100 WBC
PH UR STRIP.AUTO: >=9 [PH]
PLATELET # BLD AUTO: 120 K/UL (ref 164–446)
PMV BLD AUTO: 10.6 FL (ref 9–12.9)
POTASSIUM SERPL-SCNC: 5.9 MMOL/L (ref 3.6–5.5)
PROT SERPL-MCNC: 6.7 G/DL (ref 6–8.2)
PROT UR QL STRIP: 100 MG/DL
PROTHROMBIN TIME: 15.1 SEC (ref 12–14.6)
RBC # BLD AUTO: 3.24 M/UL (ref 4.2–5.4)
RBC # URNS HPF: NORMAL /HPF
RBC UR QL AUTO: NEGATIVE
SODIUM SERPL-SCNC: 135 MMOL/L (ref 135–145)
SP GR UR STRIP.AUTO: 1.01
TROPONIN I SERPL-MCNC: <0.01 NG/ML (ref 0–0.04)
UROBILINOGEN UR STRIP.AUTO-MCNC: 0.2 MG/DL
WBC # BLD AUTO: 6.7 K/UL (ref 4.8–10.8)
WBC #/AREA URNS HPF: NORMAL /HPF

## 2018-07-20 PROCEDURE — 81001 URINALYSIS AUTO W/SCOPE: CPT

## 2018-07-20 PROCEDURE — 99215 OFFICE O/P EST HI 40 MIN: CPT | Performed by: INTERNAL MEDICINE

## 2018-07-20 PROCEDURE — 85610 PROTHROMBIN TIME: CPT

## 2018-07-20 PROCEDURE — 700102 HCHG RX REV CODE 250 W/ 637 OVERRIDE(OP): Performed by: HOSPITALIST

## 2018-07-20 PROCEDURE — 84484 ASSAY OF TROPONIN QUANT: CPT

## 2018-07-20 PROCEDURE — 86038 ANTINUCLEAR ANTIBODIES: CPT

## 2018-07-20 PROCEDURE — 87040 BLOOD CULTURE FOR BACTERIA: CPT

## 2018-07-20 PROCEDURE — 700111 HCHG RX REV CODE 636 W/ 250 OVERRIDE (IP): Performed by: EMERGENCY MEDICINE

## 2018-07-20 PROCEDURE — 86160 COMPLEMENT ANTIGEN: CPT | Mod: 91

## 2018-07-20 PROCEDURE — 96368 THER/DIAG CONCURRENT INF: CPT

## 2018-07-20 PROCEDURE — 99223 1ST HOSP IP/OBS HIGH 75: CPT | Mod: AI | Performed by: HOSPITALIST

## 2018-07-20 PROCEDURE — 700117 HCHG RX CONTRAST REV CODE 255: Performed by: EMERGENCY MEDICINE

## 2018-07-20 PROCEDURE — 96365 THER/PROPH/DIAG IV INF INIT: CPT

## 2018-07-20 PROCEDURE — 90935 HEMODIALYSIS ONE EVALUATION: CPT

## 2018-07-20 PROCEDURE — 85730 THROMBOPLASTIN TIME PARTIAL: CPT

## 2018-07-20 PROCEDURE — 700111 HCHG RX REV CODE 636 W/ 250 OVERRIDE (IP): Performed by: INTERNAL MEDICINE

## 2018-07-20 PROCEDURE — 96366 THER/PROPH/DIAG IV INF ADDON: CPT

## 2018-07-20 PROCEDURE — G0378 HOSPITAL OBSERVATION PER HR: HCPCS

## 2018-07-20 PROCEDURE — 86225 DNA ANTIBODY NATIVE: CPT

## 2018-07-20 PROCEDURE — 700101 HCHG RX REV CODE 250: Performed by: HOSPITALIST

## 2018-07-20 PROCEDURE — 36415 COLL VENOUS BLD VENIPUNCTURE: CPT

## 2018-07-20 PROCEDURE — 5A1D70Z PERFORMANCE OF URINARY FILTRATION, INTERMITTENT, LESS THAN 6 HOURS PER DAY: ICD-10-PCS | Performed by: INTERNAL MEDICINE

## 2018-07-20 PROCEDURE — 85379 FIBRIN DEGRADATION QUANT: CPT

## 2018-07-20 PROCEDURE — 85025 COMPLETE CBC W/AUTO DIFF WBC: CPT

## 2018-07-20 PROCEDURE — 96372 THER/PROPH/DIAG INJ SC/IM: CPT

## 2018-07-20 PROCEDURE — 86235 NUCLEAR ANTIGEN ANTIBODY: CPT | Mod: 91

## 2018-07-20 PROCEDURE — 71045 X-RAY EXAM CHEST 1 VIEW: CPT

## 2018-07-20 PROCEDURE — 700105 HCHG RX REV CODE 258: Performed by: HOSPITALIST

## 2018-07-20 PROCEDURE — 80074 ACUTE HEPATITIS PANEL: CPT

## 2018-07-20 PROCEDURE — 80053 COMPREHEN METABOLIC PANEL: CPT

## 2018-07-20 PROCEDURE — 96375 TX/PRO/DX INJ NEW DRUG ADDON: CPT

## 2018-07-20 PROCEDURE — A9270 NON-COVERED ITEM OR SERVICE: HCPCS | Performed by: HOSPITALIST

## 2018-07-20 PROCEDURE — 93005 ELECTROCARDIOGRAM TRACING: CPT | Performed by: EMERGENCY MEDICINE

## 2018-07-20 PROCEDURE — 84703 CHORIONIC GONADOTROPIN ASSAY: CPT

## 2018-07-20 PROCEDURE — 71275 CT ANGIOGRAPHY CHEST: CPT

## 2018-07-20 PROCEDURE — 700111 HCHG RX REV CODE 636 W/ 250 OVERRIDE (IP): Performed by: HOSPITALIST

## 2018-07-20 PROCEDURE — 96376 TX/PRO/DX INJ SAME DRUG ADON: CPT

## 2018-07-20 RX ORDER — HEPARIN SODIUM 5000 [USP'U]/ML
5000 INJECTION, SOLUTION INTRAVENOUS; SUBCUTANEOUS EVERY 8 HOURS
Status: DISCONTINUED | OUTPATIENT
Start: 2018-07-20 | End: 2018-07-28 | Stop reason: HOSPADM

## 2018-07-20 RX ORDER — LIDOCAINE HYDROCHLORIDE 10 MG/ML
1 INJECTION, SOLUTION INFILTRATION; PERINEURAL PRN
Status: DISCONTINUED | OUTPATIENT
Start: 2018-07-20 | End: 2018-07-28 | Stop reason: HOSPADM

## 2018-07-20 RX ORDER — ASCORBIC ACID 500 MG
500 TABLET ORAL DAILY
Status: DISCONTINUED | OUTPATIENT
Start: 2018-07-20 | End: 2018-07-28 | Stop reason: HOSPADM

## 2018-07-20 RX ORDER — HYDROMORPHONE HYDROCHLORIDE 2 MG/ML
1 INJECTION, SOLUTION INTRAMUSCULAR; INTRAVENOUS; SUBCUTANEOUS ONCE
Status: COMPLETED | OUTPATIENT
Start: 2018-07-20 | End: 2018-07-20

## 2018-07-20 RX ORDER — TIZANIDINE 4 MG/1
8 TABLET ORAL
Status: DISCONTINUED | OUTPATIENT
Start: 2018-07-20 | End: 2018-07-28 | Stop reason: HOSPADM

## 2018-07-20 RX ORDER — TRAZODONE HYDROCHLORIDE 50 MG/1
50 TABLET ORAL
Status: DISCONTINUED | OUTPATIENT
Start: 2018-07-20 | End: 2018-07-28 | Stop reason: HOSPADM

## 2018-07-20 RX ORDER — POLYETHYLENE GLYCOL 3350 17 G/17G
1 POWDER, FOR SOLUTION ORAL
Status: DISCONTINUED | OUTPATIENT
Start: 2018-07-20 | End: 2018-07-28 | Stop reason: HOSPADM

## 2018-07-20 RX ORDER — FERROUS SULFATE 325(65) MG
325 TABLET ORAL DAILY
Status: DISCONTINUED | OUTPATIENT
Start: 2018-07-20 | End: 2018-07-28 | Stop reason: HOSPADM

## 2018-07-20 RX ORDER — PROMETHAZINE HYDROCHLORIDE 25 MG/1
25 TABLET ORAL EVERY 6 HOURS PRN
Status: DISCONTINUED | OUTPATIENT
Start: 2018-07-20 | End: 2018-07-28 | Stop reason: HOSPADM

## 2018-07-20 RX ORDER — ONDANSETRON 2 MG/ML
4 INJECTION INTRAMUSCULAR; INTRAVENOUS ONCE
Status: COMPLETED | OUTPATIENT
Start: 2018-07-20 | End: 2018-07-20

## 2018-07-20 RX ORDER — HYDROXYCHLOROQUINE SULFATE 200 MG/1
200 TABLET, FILM COATED ORAL
Status: DISCONTINUED | OUTPATIENT
Start: 2018-07-20 | End: 2018-07-28 | Stop reason: HOSPADM

## 2018-07-20 RX ORDER — CINACALCET 30 MG/1
30 TABLET, FILM COATED ORAL
Status: DISCONTINUED | OUTPATIENT
Start: 2018-07-20 | End: 2018-07-28 | Stop reason: HOSPADM

## 2018-07-20 RX ORDER — SEVELAMER CARBONATE 800 MG/1
2400 TABLET, FILM COATED ORAL
Status: DISCONTINUED | OUTPATIENT
Start: 2018-07-20 | End: 2018-07-28 | Stop reason: HOSPADM

## 2018-07-20 RX ORDER — PREGABALIN 100 MG/1
100 CAPSULE ORAL 3 TIMES DAILY
Status: DISCONTINUED | OUTPATIENT
Start: 2018-07-20 | End: 2018-07-23

## 2018-07-20 RX ORDER — BUPROPION HYDROCHLORIDE 150 MG/1
150 TABLET, EXTENDED RELEASE ORAL EVERY MORNING
Status: DISCONTINUED | OUTPATIENT
Start: 2018-07-20 | End: 2018-07-28 | Stop reason: HOSPADM

## 2018-07-20 RX ORDER — BISACODYL 10 MG
10 SUPPOSITORY, RECTAL RECTAL
Status: DISCONTINUED | OUTPATIENT
Start: 2018-07-20 | End: 2018-07-28 | Stop reason: HOSPADM

## 2018-07-20 RX ORDER — LIDOCAINE 50 MG/G
1 PATCH TOPICAL EVERY 24 HOURS
Status: DISCONTINUED | OUTPATIENT
Start: 2018-07-20 | End: 2018-07-28 | Stop reason: HOSPADM

## 2018-07-20 RX ORDER — AMOXICILLIN 250 MG
2 CAPSULE ORAL 2 TIMES DAILY
Status: DISCONTINUED | OUTPATIENT
Start: 2018-07-20 | End: 2018-07-28 | Stop reason: HOSPADM

## 2018-07-20 RX ORDER — HEPARIN SODIUM 1000 [USP'U]/ML
2000 INJECTION, SOLUTION INTRAVENOUS; SUBCUTANEOUS
Status: DISCONTINUED | OUTPATIENT
Start: 2018-07-20 | End: 2018-07-28 | Stop reason: HOSPADM

## 2018-07-20 RX ORDER — OXYCODONE HYDROCHLORIDE 10 MG/1
20 TABLET ORAL EVERY 6 HOURS PRN
Status: DISCONTINUED | OUTPATIENT
Start: 2018-07-20 | End: 2018-07-28 | Stop reason: HOSPADM

## 2018-07-20 RX ADMIN — BUPROPION HYDROCHLORIDE 150 MG: 150 TABLET, EXTENDED RELEASE ORAL at 12:40

## 2018-07-20 RX ADMIN — HEPARIN SODIUM 5000 UNITS: 5000 INJECTION, SOLUTION INTRAVENOUS; SUBCUTANEOUS at 08:35

## 2018-07-20 RX ADMIN — PIPERACILLIN AND TAZOBACTAM 4.5 G: 4; .5 INJECTION, POWDER, LYOPHILIZED, FOR SOLUTION INTRAVENOUS; PARENTERAL at 08:15

## 2018-07-20 RX ADMIN — OXYCODONE HYDROCHLORIDE AND ACETAMINOPHEN 500 MG: 500 TABLET ORAL at 08:35

## 2018-07-20 RX ADMIN — Medication 325 MG: at 07:30

## 2018-07-20 RX ADMIN — PREGABALIN 100 MG: 100 CAPSULE ORAL at 19:45

## 2018-07-20 RX ADMIN — PROMETHAZINE HYDROCHLORIDE 25 MG: 25 TABLET ORAL at 15:01

## 2018-07-20 RX ADMIN — OXYCODONE HYDROCHLORIDE 20 MG: 10 TABLET ORAL at 08:34

## 2018-07-20 RX ADMIN — HEPARIN SODIUM 5000 UNITS: 5000 INJECTION, SOLUTION INTRAVENOUS; SUBCUTANEOUS at 15:13

## 2018-07-20 RX ADMIN — HEPARIN SODIUM 2000 UNITS: 1000 INJECTION, SOLUTION INTRAVENOUS; SUBCUTANEOUS at 16:51

## 2018-07-20 RX ADMIN — VANCOMYCIN HYDROCHLORIDE 2100 MG: 100 INJECTION, POWDER, LYOPHILIZED, FOR SOLUTION INTRAVENOUS at 08:00

## 2018-07-20 RX ADMIN — HYDROXYCHLOROQUINE SULFATE 200 MG: 200 TABLET, FILM COATED ORAL at 19:44

## 2018-07-20 RX ADMIN — TRAZODONE HYDROCHLORIDE 50 MG: 50 TABLET ORAL at 19:44

## 2018-07-20 RX ADMIN — PREGABALIN 100 MG: 100 CAPSULE ORAL at 12:39

## 2018-07-20 RX ADMIN — PIPERACILLIN AND TAZOBACTAM 4.5 G: 4; .5 INJECTION, POWDER, LYOPHILIZED, FOR SOLUTION INTRAVENOUS; PARENTERAL at 20:31

## 2018-07-20 RX ADMIN — LIDOCAINE 1 PATCH: 50 PATCH TOPICAL at 09:54

## 2018-07-20 RX ADMIN — HYDROMORPHONE HYDROCHLORIDE 1 MG: 2 INJECTION INTRAMUSCULAR; INTRAVENOUS; SUBCUTANEOUS at 04:45

## 2018-07-20 RX ADMIN — TIZANIDINE 8 MG: 4 TABLET ORAL at 19:44

## 2018-07-20 RX ADMIN — IOHEXOL 55 ML: 350 INJECTION, SOLUTION INTRAVENOUS at 05:04

## 2018-07-20 RX ADMIN — ONDANSETRON HYDROCHLORIDE 4 MG: 2 INJECTION, SOLUTION INTRAMUSCULAR; INTRAVENOUS at 00:47

## 2018-07-20 RX ADMIN — STANDARDIZED SENNA CONCENTRATE AND DOCUSATE SODIUM 2 TABLET: 8.6; 5 TABLET, FILM COATED ORAL at 19:44

## 2018-07-20 RX ADMIN — HYDROMORPHONE HYDROCHLORIDE 1 MG: 2 INJECTION INTRAMUSCULAR; INTRAVENOUS; SUBCUTANEOUS at 02:45

## 2018-07-20 RX ADMIN — CINACALCET HYDROCHLORIDE 30 MG: 30 TABLET, COATED ORAL at 19:45

## 2018-07-20 RX ADMIN — HYDROMORPHONE HYDROCHLORIDE 1 MG: 2 INJECTION INTRAMUSCULAR; INTRAVENOUS; SUBCUTANEOUS at 00:47

## 2018-07-20 RX ADMIN — OXYCODONE HYDROCHLORIDE 20 MG: 10 TABLET ORAL at 14:59

## 2018-07-20 ASSESSMENT — PATIENT HEALTH QUESTIONNAIRE - PHQ9
SUM OF ALL RESPONSES TO PHQ9 QUESTIONS 1 AND 2: 0
2. FEELING DOWN, DEPRESSED, IRRITABLE, OR HOPELESS: NOT AT ALL
1. LITTLE INTEREST OR PLEASURE IN DOING THINGS: NOT AT ALL

## 2018-07-20 ASSESSMENT — COGNITIVE AND FUNCTIONAL STATUS - GENERAL
MOVING TO AND FROM BED TO CHAIR: A LITTLE
DAILY ACTIVITIY SCORE: 18
CLIMB 3 TO 5 STEPS WITH RAILING: A LITTLE
HELP NEEDED FOR BATHING: A LITTLE
MOBILITY SCORE: 18
SUGGESTED CMS G CODE MODIFIER MOBILITY: CK
DRESSING REGULAR LOWER BODY CLOTHING: A LITTLE
STANDING UP FROM CHAIR USING ARMS: A LITTLE
MOVING FROM LYING ON BACK TO SITTING ON SIDE OF FLAT BED: A LITTLE
TURNING FROM BACK TO SIDE WHILE IN FLAT BAD: A LITTLE
EATING MEALS: A LITTLE
DRESSING REGULAR UPPER BODY CLOTHING: A LITTLE
WALKING IN HOSPITAL ROOM: A LITTLE
TOILETING: A LITTLE
SUGGESTED CMS G CODE MODIFIER DAILY ACTIVITY: CK
PERSONAL GROOMING: A LITTLE

## 2018-07-20 ASSESSMENT — PAIN SCALES - GENERAL
PAINLEVEL_OUTOF10: 4
PAINLEVEL_OUTOF10: 10
PAINLEVEL_OUTOF10: 9
PAINLEVEL_OUTOF10: 10

## 2018-07-20 ASSESSMENT — ENCOUNTER SYMPTOMS
TREMORS: 0
ABDOMINAL PAIN: 1
HEADACHES: 0
DIZZINESS: 1
NECK PAIN: 1
FEVER: 1
EYES NEGATIVE: 1
BACK PAIN: 1
CARDIOVASCULAR NEGATIVE: 1
CHILLS: 1
MYALGIAS: 1
SPUTUM PRODUCTION: 1
COUGH: 1
SHORTNESS OF BREATH: 1
PSYCHIATRIC NEGATIVE: 1
TINGLING: 0
WEAKNESS: 1
SENSORY CHANGE: 0

## 2018-07-20 NOTE — ASSESSMENT & PLAN NOTE
-Continue with current dose of pain management  -Recommended not to perform adjustment.  Hx of opioid dependency

## 2018-07-20 NOTE — ASSESSMENT & PLAN NOTE
- Suspecting to be secondary to possible pneumonic process  - Review chest x-ray, left-sided pleural effusion is small, no thoracentesis needed  - Continue antibiotics

## 2018-07-20 NOTE — ED NOTES
Spoke with Dr. Zacarias in regards to patients request for stronger medication, no new orders. Attempted pt education for pain management, pt irritable and tearful. Pt decide to take oxycodone, and updated on POC. vss. Awaiting bed assignment.

## 2018-07-20 NOTE — ASSESSMENT & PLAN NOTE
- Patient presenting with fever, chill and productive cough not improving. Recently admitted for pneumonia. BC performed and were negative. Patient sent home with Augmentin for 10 days  - Continue to cover for HCAP with Zosyn  - Vancomycin dc'd  - BC/SC Negative  - Continue Solumedrol  - Wean off O2 as tolerated

## 2018-07-20 NOTE — DISCHARGE PLANNING
Outpatient Dialysis Note    Confirmed patient is active at:    [facility]  [address]   [place] [address]      Schedule: Monday, Wednesday, Friday  Time: 3:30 pm    Spoke with Hafsa at facility who confirmed.    Forwarded records for review.    Dialysis Coordinator, [facility]  Kelli Anderson [phone]

## 2018-07-20 NOTE — ED NOTES
Pt transported to [facility]-, all belongings w/ pt. Dialysis RN aware of room assignment. Plan to do dialysis in an hour.

## 2018-07-20 NOTE — ED PROVIDER NOTES
ED Provider Note    Scribed for Jean-Paul Castillo M.D. by Yuridia Rios. 7/20/2018, 12:07 AM.    Primary care provider: Sushila Manzano M.D.  Means of arrival: walk in  History obtained from: patient  History limited by: none    CHIEF COMPLAINT  Chief Complaint   Patient presents with   • Shortness of Breath     Pt. states that around New Year's pt had sharp pain in her back towards her kidneys. Pt. states she was recently admitted for pneumonia and no has sharp stabbing chest pain in her left chest. Pt. states hx of lupus which ended up with her being intubated and having a tracheostomy placed.   • Chest Pain     Pt. states sharp, stabbing chest pain on her left side.   • Chronic Renal Failure     Pt. states end stage renal failure. Pt. states MWF dialysis stating she did miss yesterday's session, and is now having urinary retention.       HPI  Debby Carrizales is a 37 y.o. Female with a history of lupus who presents to the Emergency Department for evaluation of shortness of breath onset one month ago with increasing severity in the past week. Patient states that she was admitted two months ago for pneumonia and has since had a sharp stabbing pain in her chest that radiates down to her hip. Patient is currently on dialysis for end stage renal failure and she missed her dialysis appointment yesterday due to weakness and shortness of breath. She is now complaining of urinary retention. She explains that she recently had a 103 °F fever one week ago. Patient was on coumadin in the past due to DVT. She takes 20 mg oxycodone daily for her pain. No alleviating or exacerbating factors mentioned.    REVIEW OF SYSTEMS  Pertinent positives include shortness of breath, chest pain, urinary retention. Pertinent negatives include fever. All other systems negative.  C    PAST MEDICAL HISTORY   has a past medical history of Arthritis; Avascular necrosis of bones of both hips (HCC) (10/10/2016); Clostridium difficile  colitis (5/3/2011); Dialysis patient; ESBL (extended spectrum beta-lactamase) producing bacteria infection (8/25/2014); Fibromyalgia; Hypertension; Lupus; Pneumonia (); Psychiatric disorder; Pyelonephritis (11/21/2017); and Renal failure.    SURGICAL HISTORY   has a past surgical history that includes gastroscopy-endo (4/10/2011); sclerotheraphy (4/10/2011); gastroscopy-endo (4/18/2011); colonoscopy with biopsy (4/20/2011); gastroscopy-endo (4/27/2011); other (5/2011); other abdominal surgery; av fistula creation (9/9/2014); cath placement (9/9/2014); av fistula creation (11/14/2014); av fistula creation (2/3/2015); hip arthroplasty total (Right, 1/18/2016); closed reduction (Right, 7/5/2016); av fistula creation (Right, 7/12/2016); thrombectomy (Right, 8/20/2016); thrombectomy (Right, 8/21/2016); angioplasty balloon (8/21/2016); tracheostomy; av fistula creation (Right, 12/9/2017); and thrombectomy (12/9/2017).    SOCIAL HISTORY  Social History   Substance Use Topics   • Smoking status: Former Smoker     Packs/day: 0.50     Years: 18.00     Types: Cigarettes     Quit date: 6/13/2011   • Smokeless tobacco: Never Used      Comment: 1/2 ppd   • Alcohol use No      History   Drug Use No       FAMILY HISTORY  Family History   Problem Relation Age of Onset   • Heart Disease Mother    • Cancer Father        CURRENT MEDICATIONS  No current facility-administered medications for this encounter.     Current Outpatient Prescriptions:   •  Oxycodone HCl 20 MG Tab, Take 1 Tab by mouth every 6 hours as needed (Take up to three a day as needed and one additional pill after dialysis (3 times a week)) for up to 30 days., Disp: 102 Tab, Rfl: 0  •  amoxicillin-clavulanate (AUGMENTIN) 500-125 MG Tab, Take 1 Tab by mouth every day. Take after Hemolysis on days of dialysis, Disp: 8 Tab, Rfl: 0  •  pregabalin (LYRICA) 100 MG Cap, Take 100 mg by mouth 3 times a day., Disp: , Rfl:   •  promethazine (PHENERGAN) 25 MG Tab, Take 25 mg  by mouth every 6 hours as needed for Nausea/Vomiting., Disp: , Rfl:   •  cinacalcet (SENSIPAR) 30 MG Tab, Take 30 mg by mouth every bedtime., Disp: , Rfl:   •  sevelamer carbonate (RENVELA) 800 MG Tab tablet, Take 2,400 mg by mouth 3 times a day, with meals., Disp: , Rfl:   •  lidocaine (LIDODERM) 5 % Patch, Apply 1 Patch to skin as directed every 24 hours. Apply to back, Disp: , Rfl:   •  ferrous sulfate 325 (65 FE) MG tablet, Take 325 mg by mouth every day., Disp: , Rfl:   •  ascorbic acid (ASCORBIC ACID) 500 MG Tab, Take 500 mg by mouth every day., Disp: , Rfl:   •  buPROPion (WELLBUTRIN XL) 150 MG XL tablet, Take 150 mg by mouth every morning., Disp: , Rfl:   •  hydroxychloroquine (PLAQUENIL) 200 MG Tab, Take 200 mg by mouth every bedtime., Disp: , Rfl:   •  cholecalciferol (VITAMIN D3) 5000 UNIT Cap, Take 10,000 Units by mouth every day., Disp: , Rfl:   •  trazodone (DESYREL) 50 MG Tab, Take 1 Tab by mouth every bedtime., Disp: 30 Tab, Rfl: 0  •  tizanidine (ZANAFLEX) 4 MG Tab, Take 2 Tabs by mouth every bedtime., Disp: 60 Tab, Rfl: 0    ALLERGIES  Allergies   Allergen Reactions   • Ativan Anxiety     Patient becomes severely paranoid and agitated   • Morphine Itching     Tolerates Dilaudid   • Seasonal Runny Nose and Itching     Hay fever, sabiha brush       PHYSICAL EXAM  VITAL SIGNS: /101   Pulse 66   Temp 37.3 °C (99.1 °F) (Temporal)   Resp (!) 33   Wt 85.3 kg (188 lb 0.8 oz)   SpO2 95%   BMI 31.29 kg/m²     Constitutional: Well developed, Well nourished, mild distress.   HENT: Normocephalic, Atraumatic, Oropharynx moist.   Eyes: Conjunctiva normal, No discharge.   Neck: Supple, No stridor  Cardiovascular: Normal heart rate, Normal rhythm, No murmurs, equal pulses.   Pulmonary: Normal breath sounds, No respiratory distress, No wheezing, No rhonchi. Rales in left base.  Chest: No chest wall tenderness or deformity. Non reproducible chest pain over left ribs.  Abdomen:Soft, Slight LUQ tenderness, No  masses, no rebound, no guarding.   Back: No CVA tenderness.   Musculoskeletal: No major deformities noted, No tenderness. No edema in legs, no calf tenderness.  Skin: Warm, Dry, No erythema, No rash. AV graft on right bicep with positive thrill.  Neurologic: Alert & oriented x 3, Normal motor function,  No focal deficits noted.   Psychiatric: Affect normal, Judgment normal, Mood normal.     LABS  Results for orders placed or performed during the hospital encounter of 07/19/18   CBC w/ Differential   Result Value Ref Range    WBC 6.7 4.8 - 10.8 K/uL    RBC 3.24 (L) 4.20 - 5.40 M/uL    Hemoglobin 9.8 (L) 12.0 - 16.0 g/dL    Hematocrit 31.0 (L) 37.0 - 47.0 %    MCV 95.7 81.4 - 97.8 fL    MCH 30.2 27.0 - 33.0 pg    MCHC 31.6 (L) 33.6 - 35.0 g/dL    RDW 48.7 35.9 - 50.0 fL    Platelet Count 120 (L) 164 - 446 K/uL    MPV 10.6 9.0 - 12.9 fL    Neutrophils-Polys 66.40 44.00 - 72.00 %    Lymphocytes 18.40 (L) 22.00 - 41.00 %    Monocytes 9.70 0.00 - 13.40 %    Eosinophils 4.20 0.00 - 6.90 %    Basophils 0.70 0.00 - 1.80 %    Immature Granulocytes 0.60 0.00 - 0.90 %    Nucleated RBC 0.00 /100 WBC    Neutrophils (Absolute) 4.45 2.00 - 7.15 K/uL    Lymphs (Absolute) 1.23 1.00 - 4.80 K/uL    Monos (Absolute) 0.65 0.00 - 0.85 K/uL    Eos (Absolute) 0.28 0.00 - 0.51 K/uL    Baso (Absolute) 0.05 0.00 - 0.12 K/uL    Immature Granulocytes (abs) 0.04 0.00 - 0.11 K/uL    NRBC (Absolute) 0.00 K/uL   Complete Metabolic Panel (CMP)   Result Value Ref Range    Sodium 135 135 - 145 mmol/L    Potassium 5.9 (H) 3.6 - 5.5 mmol/L    Chloride 101 96 - 112 mmol/L    Co2 20 20 - 33 mmol/L    Anion Gap 14.0 (H) 0.0 - 11.9    Glucose 80 65 - 99 mg/dL    Bun 43 (H) 8 - 22 mg/dL    Creatinine 10.87 (HH) 0.50 - 1.40 mg/dL    Calcium 7.6 (L) 8.5 - 10.5 mg/dL    AST(SGOT) 8 (L) 12 - 45 U/L    ALT(SGPT) 5 2 - 50 U/L    Alkaline Phosphatase 95 30 - 99 U/L    Total Bilirubin 0.5 0.1 - 1.5 mg/dL    Albumin 3.8 3.2 - 4.9 g/dL    Total Protein 6.7 6.0 - 8.2  g/dL    Globulin 2.9 1.9 - 3.5 g/dL    A-G Ratio 1.3 g/dL   Troponin STAT   Result Value Ref Range    Troponin I <0.01 0.00 - 0.04 ng/mL   PT/INR   Result Value Ref Range    PT 15.1 (H) 12.0 - 14.6 sec    INR 1.22 (H) 0.87 - 1.13   APTT   Result Value Ref Range    APTT 60.0 (H) 24.7 - 36.0 sec   Urinalysis, culture if indicated   Result Value Ref Range    Color Yellow     Character Clear     Specific Gravity 1.008 <1.035    Ph >=9.0 (A) 5.0 - 8.0    Glucose 100 (A) Negative mg/dL    Ketones Negative Negative mg/dL    Protein 100 (A) Negative mg/dL    Bilirubin Negative Negative    Urobilinogen, Urine 0.2 Negative    Nitrite Negative Negative    Leukocyte Esterase Negative Negative    Occult Blood Negative Negative    Micro Urine Req Microscopic    D-DIMER   Result Value Ref Range    D-Dimer Screen 928 (H) <250 ng/mL(D-DU)   ESTIMATED GFR   Result Value Ref Range    GFR If African American 5 (A) >60 mL/min/1.73 m 2    GFR If Non African American 4 (A) >60 mL/min/1.73 m 2   URINE MICROSCOPIC (W/UA)   Result Value Ref Range    WBC 0-2 /hpf    RBC 0-2 /hpf    Bacteria Negative None /hpf    Epithelial Cells Few /hpf    Hyaline Cast 0-2 /lpf   EKG (ER)   Result Value Ref Range    Report       [facility] Emergency Dept.    Test Date:  2018  Pt Name:    TAYLOR WARD               Department: ER  MRN:        [id number]                      Room:  Gender:     Female                       Technician: [id number]  :        1980                   Requested By:ER TRIAGE PROTOCOL  Order #:    [id number]                    Reading MD:    Measurements  Intervals                                Axis  Rate:       90                           P:          44  SC:         136                          QRS:        15  QRSD:       78                           T:          47  QT:         348  QTc:        426    Interpretive Statements  SINUS RHYTHM  Compared to ECG 2018 00:22:02  Sinus tachycardia no longer  present  ST (T wave) deviation no longer present         All labs reviewed by me.    EKG  12 Lead EKG interpreted by me shows a normal sinus rhythm at a rate of 90. Axis normal. No ST elevations. No T wave inversions. Old EKG from 6/7/2018 shows no significant changes. Final impression: no longer tachycardic, no cute changes, normal EKG.    RADIOLOGY  DX-CHEST-PORTABLE (1 VIEW)   Final Result      Interval development of left basilar opacities as described above.      CT-CTA CHEST PULMONARY ARTERY W/ RECONS    (Results Pending)     The radiologist's interpretation of all radiological studies have been reviewed by me.    COURSE & MEDICAL DECISION MAKING  Pertinent Labs & Imaging studies reviewed. (See chart for details)    12:07 AM - Patient seen and examined at bedside. Patient will be treated with 1 mg diluadid.  Ordered EKG, CBC, CMP, troponin, PT/INR, APTT, urinalysis, Dx-chest to evaluate her symptoms. The differential diagnoses include but are not limited to: pneumonia, PE, atypical chest pain, chest wall pain, lupus flare up, chronic pain, UTI.     Patient is turned over at 2:30 AM pending CT.  I think her chest pain may be related to pulmonary embolism versus pneumonia.  Patient's white count is normal.  I will hold off on ordering antibiotics until CT results are back.  I discussed case with Dr. Hernandez and he will follow-up on CT results.  I did discuss with the patient that she needs to get her dialysis today because of CT with contrast.        FINAL IMPRESSION  1. Left sided chest pain    2. Dialysis patient (HCC)          Yuridia BARGER ([place]), am scribing for, and in the presence of, Jean-Paul Castillo M.D.    Electronically signed by: Yuridia Rios ([place]), 7/20/2018    Jean-Paul BARGER M.D. personally performed the services described in this documentation, as scribed by Yuridia Rios in my presence, and it is both accurate and complete.    The note accurately reflects work and decisions  made by me.  Jean-Paul aCstillo  7/20/2018  2:48 AM

## 2018-07-20 NOTE — ED NOTES
Assist Rn:  Pt ambulates slowly to room, offered a wheelchair but refuses.  Pt changing into gown.  A&ox4.  Friend at bedside.  Chart up for ERP evaluation.

## 2018-07-20 NOTE — CONSULTS
Consults   Nephrology Inpatient Consultation    Date of Service  7/20/2018    Reason for Consultation  To assess the need for dialysis in a patient with end-stage renal disease    History of Presenting Illness  37 y.o. female admitted 7/19/2018 with end-stage renal disease from lupus.  The patient undergoes dialysis regularly, she presents with chest pain felt to be pleuritic chest pain that has been progressively worse over the last week or so.  She states that she has been admitted for pneumonia in the past.  She is very uncomfortable and sitting up in bed.  She is concerned she will not be able to complete her dialysis due to her pain.    Referring Physician  Kenan Lima M.D.    Consulting Physician  Spike Gotti M.D.    Review of Systems  Review of Systems   Constitutional: Positive for chills and fever.   Cardiovascular: Positive for chest pain.   All other systems reviewed and are negative.     Past Medical History  Past Medical History:   Diagnosis Date   • Pyelonephritis 11/21/2017   • Avascular necrosis of bones of both hips (HCC) 10/10/2016   • ESBL (extended spectrum beta-lactamase) producing bacteria infection 8/25/2014   • Pneumonia    • Clostridium difficile colitis 5/3/2011   • Arthritis     all joints,r/t lupus   • Dialysis patient    • Fibromyalgia    • Hypertension    • Lupus    • Psychiatric disorder     anxiety, depression   • Renal failure        Surgical History  Past Surgical History:   Procedure Laterality Date   • AV FISTULA CREATION Right 12/9/2017    Procedure: AV FISTULA CREATION-ARM FOR GRAFT;  Surgeon: Lesly Jansen M.D.;  Location: [facility];  Service: General   • THROMBECTOMY  12/9/2017    Procedure: THROMBECTOMY;  Surgeon: Lesly Jansen M.D.;  Location: SURGERY [facility];  Service: General   • THROMBECTOMY Right 8/21/2016    Procedure: THROMBECTOMY - right AV fistula graft with grams;  Surgeon: Shabbir Ardon M.D.;  Location: SURGERY  [facility];  Service:    • ANGIOPLASTY BALLOON  8/21/2016    Procedure: ANGIOPLASTY BALLOON;  Surgeon: Shabbir Ardon M.D.;  Location: SURGERY [facility];  Service:    • THROMBECTOMY Right 8/20/2016    Procedure: THROMBECTOMY AV GRAFT;  Surgeon: Shabbir Ardon M.D.;  Location: SURGERY [facility];  Service:    • AV FISTULA CREATION Right 7/12/2016    Procedure: AV FISTULA CREATION WITH GRAFT BRACHIAL AXILLARY;  Surgeon: Shabbir Ardon M.D.;  Location: SURGERY [facility];  Service:    • CLOSED REDUCTION Right 7/5/2016    Procedure: CLOSED REDUCTION- Hip ;  Surgeon: Michael Holman M.D.;  Location: SURGERY [facility];  Service:    • HIP ARTHROPLASTY TOTAL Right 1/18/2016    Procedure: HIP ARTHROPLASTY TOTAL;  Surgeon: Michael Holman M.D.;  Location: [facility];  Service:    • AV FISTULA CREATION  2/3/2015    Performed by Shabbir Ardon M.D. at SURGERY [facility]   • AV FISTULA CREATION  11/14/2014    Performed by Shabbir Ardon M.D. at [facility]   • AV FISTULA CREATION  9/9/2014    Performed by Shabbir Ardon M.D. at SURGERY [facility]   • CATH PLACEMENT  9/9/2014    Performed by Shabbir Ardon M.D. at [facility]   • OTHER  5/2011    tracheostomy   • GASTROSCOPY-ENDO  4/27/2011    Performed by PALMIRA DOAN at [facility]   • COLONOSCOPY WITH BIOPSY  4/20/2011    Performed by ALCIRA CRUZ at [facility]   • GASTROSCOPY-ENDO  4/18/2011    Performed by ALCIRA CRUZ at [facility]   • GASTROSCOPY-ENDO  4/10/2011    Performed by SYED JURADO at [facility]   • SCLEROTHERAPHY  4/10/2011    Performed by SYED JURADO at [facility]   • OTHER ABDOMINAL SURGERY      kidney biopsy   • TRACHEOSTOMY         Medications  No current facility-administered medications on file prior to encounter.      Current Outpatient Prescriptions on  File Prior to Encounter   Medication Sig Dispense Refill   • Oxycodone HCl 20 MG Tab Take 1 Tab by mouth every 6 hours as needed (Take up to three a day as needed and one additional pill after dialysis (3 times a week)) for up to 30 days. 102 Tab 0   • pregabalin (LYRICA) 100 MG Cap Take 100 mg by mouth 3 times a day.     • promethazine (PHENERGAN) 25 MG Tab Take 25 mg by mouth every 6 hours as needed for Nausea/Vomiting.     • cinacalcet (SENSIPAR) 30 MG Tab Take 30 mg by mouth every bedtime.     • sevelamer carbonate (RENVELA) 800 MG Tab tablet Take 2,400 mg by mouth 3 times a day, with meals.     • lidocaine (LIDODERM) 5 % Patch Apply 1 Patch to skin as directed every 24 hours. Apply to back     • ferrous sulfate 325 (65 FE) MG tablet Take 325 mg by mouth every day.     • ascorbic acid (ASCORBIC ACID) 500 MG Tab Take 500 mg by mouth every day.     • buPROPion (WELLBUTRIN XL) 150 MG XL tablet Take 150 mg by mouth every morning.     • hydroxychloroquine (PLAQUENIL) 200 MG Tab Take 200 mg by mouth every bedtime.     • cholecalciferol (VITAMIN D3) 5000 UNIT Cap Take 10,000 Units by mouth every day.     • trazodone (DESYREL) 50 MG Tab Take 1 Tab by mouth every bedtime. 30 Tab 0   • tizanidine (ZANAFLEX) 4 MG Tab Take 2 Tabs by mouth every bedtime. 60 Tab 0       Family History  family history includes Cancer in her father; Heart Disease in her mother.    Social History  Social History   Substance Use Topics   • Smoking status: Former Smoker     Packs/day: 0.50     Years: 18.00     Types: Cigarettes     Quit date: 2011   • Smokeless tobacco: Never Used      Comment: /2 ppd   • Alcohol use No       Allergies  Allergies   Allergen Reactions   • Lorazepam [Ativan] Anxiety     Patient becomes severely paranoid and agitated   • Morphine Itching     Tolerates Dilaudid   • Seasonal Runny Nose and Itching     Hay fever, sabiha brush        Physical Exam  Laboratory/Imaging   Hemodynamics  Temp (24hrs), Av.3 °C (99.1  °F), Min:37.3 °C (99.1 °F), Max:37.3 °C (99.1 °F)   Temperature: 37.3 °C (99.1 °F)  Pulse  Av.8  Min: 66  Max: 98 Heart Rate (Monitored): 92  Blood Pressure: 154/101, NIBP: 118/66      Respiratory      Respiration: 18, Pulse Oximetry: 97 %             Fluids       Nutrition  Orders Placed This Encounter   Procedures   • Diet Order Hepatic     Standing Status:   Standing     Number of Occurrences:   1     Order Specific Question:   Diet:     Answer:   Hepatic [9]       Physical Exam   Constitutional: She is oriented to person, place, and time. She appears well-developed.   Uncomfortable appearing sitting up in bed   HENT:   Head: Normocephalic and atraumatic.   Cardiovascular: Normal rate and regular rhythm.    Pulmonary/Chest: Effort normal. She has no wheezes. She has no rales.   Abdominal: Soft. Bowel sounds are normal.   Musculoskeletal: She exhibits no edema or tenderness.   Neurological: She is alert and oriented to person, place, and time.   Skin: Skin is warm and dry.       Recent Labs      18   0040   WBC  6.7   RBC  3.24*   HEMOGLOBIN  9.8*   HEMATOCRIT  31.0*   MCV  95.7   MCH  30.2   MCHC  31.6*   RDW  48.7   PLATELETCT  120*   MPV  10.6     Recent Labs      18   0040   SODIUM  135   POTASSIUM  5.9*   CHLORIDE  101   CO2  20   GLUCOSE  80   BUN  43*   CREATININE  10.87*   CALCIUM  7.6*     Recent Labs      18   0040   APTT  60.0*   INR  1.22*                  Assessment/Plan     1. ESRD - HD normally on a MWF schedule. At this time, plan for dialysis today, orders entered. Discussed with patient.   2. Pleuritic chest pain, CTA shows left sided effusion  3. Anemia  4. History of lupus - check KARLA, complements, lupus flare would be less likely due to ESRD status

## 2018-07-20 NOTE — PROGRESS NOTES
Pharmacy Kinetics 37 y.o. female on vancomycin day # 1 2018    Currently on Vancomycin 2100 mg iv once (25 mg/kg load)    Indication for Treatment: HAP    Pertinent history per medical record: Admitted on 2018 for shortness of breath and chest pain.  Patient recently was admitted in  and treated for community acquired pneumonia and received IV antibiotics.  CT was negative for PE, and showed areas of atelectasis.  Broad spectrum antibiotics were initiated.  Of note, patient is ESRD on intermittent HD.    Other antibiotics: Zosyn 4.5g IV q12h    Allergies: Ativan; Morphine; and Seasonal     List concerns for renal function : ESRD on iHD    Pertinent cultures to date:   18 - peripheral blood x2 - in process  18 - MRSA nares - ordered    Recent Labs      18   0040   WBC  6.7   NEUTSPOLYS  66.40     Recent Labs      18   0040   BUN  43*   CREATININE  10.87*   ALBUMIN  3.8     No results for input(s): VANCOTROUGH, VANCOPEAK, VANCORANDOM in the last 72 hours.No intake or output data in the 24 hours ending 18 0746   Blood pressure 154/101, pulse 95, temperature 37.3 °C (99.1 °F), temperature source Temporal, resp. rate 18, weight 85.3 kg (188 lb 0.8 oz), SpO2 97 %. Temp (24hrs), Av.3 °C (99.1 °F), Min:37.3 °C (99.1 °F), Max:37.3 °C (99.1 °F)      A/P   1. Vancomycin dose change: Initiate pulse dosing  2. Next vancomycin level: 2 days (ordered with am labs on )  3. Goal trough: 16-20 mcg/mL  4. Comments: Patient on intermittent HD, thus will initiate pulse dosing for vancomycin.  MRSA nares to be collected - recommend prompt de-escalation of vancomycin therapy if MRSA can be ruled out.  Pharmacy will continue to monitor.    Casandra Garcia, PharmD., BCPS

## 2018-07-20 NOTE — ED PROVIDER NOTES
ED Provider Note    0545: D/W Dr. Montenegro, hospitalist, who will admit patient      Patient signed out from Dr. Castillo with CT pending.  Please see his note for the initial evaluation and management.  Findings discussed with the patient.  Patient reports that she is still having left-sided chest pain and shortness of breath and is also noted to require supplemental oxygen to maintain adequate pulse ox.  Patient is agreeable to admission.  Discussed with Dr. Montenegro who graciously agreed to admit patient for further care.

## 2018-07-20 NOTE — ASSESSMENT & PLAN NOTE
Patient with hx of lupus nephritis with ESRD on HD MWF. Patient missed last HD.  -Nephrology consulted to help with dialysis arrangements, appreciate help

## 2018-07-20 NOTE — ED TRIAGE NOTES
Debby Carrizales  37 y.o. female  Chief Complaint   Patient presents with   • Shortness of Breath     Pt. states that around New Year's pt had sharp pain in her back towards her kidneys. Pt. states she was recently admitted for pneumonia and no has sharp stabbing chest pain in her left chest. Pt. states hx of lupus which ended up with her being intubated and having a tracheostomy placed.   • Chest Pain     Pt. states sharp, stabbing chest pain on her left side.   • Chronic Renal Failure     Pt. states end stage renal failure. Pt. states MWF dialysis stating she did miss yesterday's session, and is now having urinary retention.     /101   Pulse 94   Temp 37.3 °C (99.1 °F) (Temporal)   Resp 16   Wt 85.3 kg (188 lb 0.8 oz)   SpO2 97%   BMI 31.29 kg/m²   Pt. Is pale in appearance upon presentation for triage assessment. Charge RN notified.  Pt amb to triage with steady gait for above complaint with assistance of cane.  Pt is alert and oriented, speaking in full sentences, follows commands and responds appropriately to questions. NAD. Resp are even and unlabored.  Pt placed in senior lounge. Pt educated on triage process. Pt encouraged to alert staff for any changes.

## 2018-07-20 NOTE — H&P
Hospital Medicine History & Physical Note    Date of Service  7/20/2018    Primary Care Physician  Sushila Manzano M.D.    Consultants  none    Code Status  Full    Chief Complaint  SOB  Left side pleuritic chest pain    History of Presenting Illness  Debby Carrizales is a 37 y.o. Female with PmHx of SLE, Lupus nephritis, ESRD on HD who presents to the Emergency Department for evaluation of shortness of breath onset one month ago with progressing SOB for the last 7 days. Patient was admitted two months ago for pneumonia and treated with Augmentin at the moment of discharge. Since has been presenting sharp stabbing pain in her chest , left side that radiates down to her hip. Patient is currently on dialysis for end stage renal failure and she missed her dialysis appointment yesterday due to weakness and shortness of breath. She is now complaining of urinary retention and more shortness of breath. She stated fever of 103 °F fever a week ago.    Review of Systems  Review of Systems   Constitutional: Positive for chills, fever and malaise/fatigue.   HENT: Negative.    Eyes: Negative.    Respiratory: Positive for cough, sputum production and shortness of breath.    Cardiovascular: Negative.    Gastrointestinal: Positive for abdominal pain.   Genitourinary:        Oliguria/urinary retention   Musculoskeletal: Positive for back pain, joint pain, myalgias and neck pain.   Skin: Negative.    Neurological: Positive for dizziness and weakness. Negative for tingling, tremors, sensory change and headaches.   Endo/Heme/Allergies: Negative.    Psychiatric/Behavioral: Negative.        Past Medical History   has a past medical history of Arthritis; Avascular necrosis of bones of both hips (HCC) (10/10/2016); Clostridium difficile colitis (5/3/2011); Dialysis patient; ESBL (extended spectrum beta-lactamase) producing bacteria infection (8/25/2014); Fibromyalgia; Hypertension; Lupus; Pneumonia (); Psychiatric  disorder; Pyelonephritis (11/21/2017); and Renal failure. She also has no past medical history of Chronic airway obstruction, not elsewhere classified or Fall.    Surgical History   has a past surgical history that includes gastroscopy-endo (4/10/2011); sclerotheraphy (4/10/2011); gastroscopy-endo (4/18/2011); colonoscopy with biopsy (4/20/2011); gastroscopy-endo (4/27/2011); other (5/2011); other abdominal surgery; av fistula creation (9/9/2014); cath placement (9/9/2014); av fistula creation (11/14/2014); av fistula creation (2/3/2015); hip arthroplasty total (Right, 1/18/2016); closed reduction (Right, 7/5/2016); av fistula creation (Right, 7/12/2016); thrombectomy (Right, 8/20/2016); thrombectomy (Right, 8/21/2016); angioplasty balloon (8/21/2016); tracheostomy; av fistula creation (Right, 12/9/2017); and thrombectomy (12/9/2017).     Family History     Family History   Problem Relation Age of Onset   • Heart Disease Mother     • Cancer           Social History  Social History   Substance Use Topics   • Smoking status: Former Smoker       Packs/day: 0.50       Years: 18.00       Types: Cigarettes       Quit date: 6/13/2011   • Smokeless tobacco: Never Used         Comment: 1/2 ppd   • Alcohol use No            History   Drug Use              Allergies        Allergies   Allergen Reactions   • Ativan Anxiety       Patient becomes severely paranoid and agitated   • Morphine Itching       Tolerates Dilaudid   • Seasonal Runny Nose and Itching         Allergies  Allergies   Allergen Reactions   • Ativan Anxiety     Patient becomes severely paranoid and agitated   • Morphine Itching     Tolerates Dilaudid   • Seasonal Runny Nose and Itching     Hay fever, sabiha brush       Medications  Prior to Admission Medications   Prescriptions Last Dose Informant Patient Reported? Taking?   Oxycodone HCl 20 MG Tab   No No   Sig: Take 1 Tab by mouth every 6 hours as needed (Take up to three a day as needed and one additional pill  after dialysis (3 times a week)) for up to 30 days.   amoxicillin-clavulanate (AUGMENTIN) 500-125 MG Tab   No No   Sig: Take 1 Tab by mouth every day. Take after Hemolysis on days of dialysis   ascorbic acid (ASCORBIC ACID) 500 MG Tab 6/26/2018 at am Patient Yes No   Sig: Take 500 mg by mouth every day.   buPROPion (WELLBUTRIN XL) 150 MG XL tablet 6/26/2018 at am Patient Yes No   Sig: Take 150 mg by mouth every morning.   cholecalciferol (VITAMIN D3) 5000 UNIT Cap 6/26/2018 at am Patient Yes No   Sig: Take 10,000 Units by mouth every day.   cinacalcet (SENSIPAR) 30 MG Tab 6/26/2018 at pm Patient Yes No   Sig: Take 30 mg by mouth every bedtime.   ferrous sulfate 325 (65 FE) MG tablet 6/26/2018 at am Patient Yes No   Sig: Take 325 mg by mouth every day.   hydroxychloroquine (PLAQUENIL) 200 MG Tab 6/26/2018 at pm Patient Yes No   Sig: Take 200 mg by mouth every bedtime.   lidocaine (LIDODERM) 5 % Patch 6/26/2018 at unk Patient Yes No   Sig: Apply 1 Patch to skin as directed every 24 hours. Apply to back   pregabalin (LYRICA) 100 MG Cap 6/26/2018 at pm Patient Yes No   Sig: Take 100 mg by mouth 3 times a day.   promethazine (PHENERGAN) 25 MG Tab PRN at PRN Patient Yes No   Sig: Take 25 mg by mouth every 6 hours as needed for Nausea/Vomiting.   sevelamer carbonate (RENVELA) 800 MG Tab tablet 6/26/2018 at pm Patient Yes No   Sig: Take 2,400 mg by mouth 3 times a day, with meals.   tizanidine (ZANAFLEX) 4 MG Tab 6/26/2018 at pm Patient No No   Sig: Take 2 Tabs by mouth every bedtime.   trazodone (DESYREL) 50 MG Tab 6/26/2018 at pm Patient No No   Sig: Take 1 Tab by mouth every bedtime.      Facility-Administered Medications: None       Physical Exam  Blood Pressure: 154/101   Temperature: 37.3 °C (99.1 °F)   Pulse: 95   Respiration: 18   Pulse Oximetry: 97 %     Physical Exam   Constitutional: She is oriented to person, place, and time. She appears well-developed and well-nourished.   HENT:   Head: Normocephalic and  atraumatic.   Eyes: Pupils are equal, round, and reactive to light.   Neck: Normal range of motion. Neck supple. JVD present.   Cardiovascular: Normal rate, regular rhythm and normal heart sounds.    Pulmonary/Chest:   Decrease BS on the left side   Abdominal: Soft. Bowel sounds are normal.   Musculoskeletal: Normal range of motion.   Neurological: She is alert and oriented to person, place, and time.   Skin: Skin is warm.   Psychiatric: She has a normal mood and affect. Her behavior is normal. Thought content normal.       Laboratory:  Recent Labs      07/20/18   0040   WBC  6.7   RBC  3.24*   HEMOGLOBIN  9.8*   HEMATOCRIT  31.0*   MCV  95.7   MCH  30.2   MCHC  31.6*   RDW  48.7   PLATELETCT  120*   MPV  10.6     Recent Labs      07/20/18   0040   SODIUM  135   POTASSIUM  5.9*   CHLORIDE  101   CO2  20   GLUCOSE  80   BUN  43*   CREATININE  10.87*   CALCIUM  7.6*     Recent Labs      07/20/18   0040   ALTSGPT  5   ASTSGOT  8*   ALKPHOSPHAT  95   TBILIRUBIN  0.5   GLUCOSE  80     Recent Labs      07/20/18   0040   APTT  60.0*   INR  1.22*             Lab Results   Component Value Date    TROPONINI <0.01 07/20/2018       Urinalysis:    Lab Results   Component Value Date    SPECGRAVITY 1.008 07/20/2018    GLUCOSEUR 100 (A) 07/20/2018    KETONES Negative 07/20/2018    NITRITE Negative 07/20/2018    WBCURINE 0-2 07/20/2018    RBCURINE 0-2 07/20/2018    BACTERIA Negative 07/20/2018    EPITHELCELL Few 07/20/2018        Imaging:  CT-CTA CHEST PULMONARY ARTERY W/ RECONS   Final Result      1.  No evidence of pulmonary emboli.   2.  Left lower lobe atelectasis with moderate size left pleural effusion.            DX-CHEST-PORTABLE (1 VIEW)   Final Result      Interval development of left basilar opacities as described above.            Assessment/Plan:  I anticipate this patient will require at least two midnights for appropriate medical management, necessitating inpatient admission.    * Pneumonia- (present on admission)    Assessment & Plan    - Patient presenting with fever, chill and productive cough not improving. Recently admitted for pneumonia. BC performed and were negative. Patient sent home with Augmentin for 10 days  -Will cover for HCAP  -BC/SC ordered  - Deescalation therapy upon results of culture  - Patient high risk for pseudomonas/MDR and MRSA. Will start Zosyn and Vancomycin for now.        Pleural effusion on left   Assessment & Plan    - Suspecting to be secondary to possible pneumonic process  -Will need thoracentesis for diagnosis.  -Will cover with abx's for now.         Chronic lupus nephritis (HCC)- (present on admission)   Assessment & Plan    Continue with home meds        Avascular necrosis of bones of both hips (HCC)- (present on admission)   Assessment & Plan    -Continue with current dose of pain management  -Recommended not to perform adjustment.  Hx of opioid dependency        ESRD (end stage renal disease) on dialysis (HCC)- (present on admission)   Assessment & Plan    Patient with hx of lupus nephritis with ESRD on HD MWF. Patient missed last HD.  -Nephrology to follow for orders        Drug-seeking behavior- (present on admission)   Assessment & Plan    - Recommended no further adjustment(increase) on current dose.            VTE prophylaxis: heparin

## 2018-07-20 NOTE — ASSESSMENT & PLAN NOTE
- Recommended no further adjustment(increase) on current dose.  - Extensive review of patient's past medical history, see the patient has been admitted here numerous times over the last 5-6 years, understandably patient has complex history and feels awful but cannot keep escalating medications  - Patient typically bargains or will play of her symptoms to request extra doses of opioids  - Palliative signed off with following recommendations (7/24): Methadone 2.5 mg PO BID without escalation before 4-6 days. If methadone started, monitor EKG and assess for QTc prolongation; monitor electrolytes K and Mg. Adjust oxycodone for better pain relief without increasing total daily dose to 10 mg Q 3 hours PRN pain. Lower oxycodone as methadone takes therapeutic effect. Refer to outpatient pain management for follow-up. Opioids (oxycodone) should be titrated down as they will likely aggravate her fibromyalgia. Could increase pregabalin to 75 mg daily (this is the highest renal dose)

## 2018-07-21 LAB
ALBUMIN SERPL BCP-MCNC: 3.6 G/DL (ref 3.2–4.9)
ALBUMIN/GLOB SERPL: 1.3 G/DL
ALP SERPL-CCNC: 77 U/L (ref 30–99)
ALT SERPL-CCNC: <5 U/L (ref 2–50)
ANION GAP SERPL CALC-SCNC: 13 MMOL/L (ref 0–11.9)
AST SERPL-CCNC: 8 U/L (ref 12–45)
BASOPHILS # BLD AUTO: 0.6 % (ref 0–1.8)
BASOPHILS # BLD: 0.05 K/UL (ref 0–0.12)
BILIRUB SERPL-MCNC: 0.8 MG/DL (ref 0.1–1.5)
BUN SERPL-MCNC: 28 MG/DL (ref 8–22)
CALCIUM SERPL-MCNC: 7.6 MG/DL (ref 8.5–10.5)
CHLORIDE SERPL-SCNC: 98 MMOL/L (ref 96–112)
CO2 SERPL-SCNC: 24 MMOL/L (ref 20–33)
CREAT SERPL-MCNC: 7.51 MG/DL (ref 0.5–1.4)
EOSINOPHIL # BLD AUTO: 0.14 K/UL (ref 0–0.51)
EOSINOPHIL NFR BLD: 1.7 % (ref 0–6.9)
ERYTHROCYTE [DISTWIDTH] IN BLOOD BY AUTOMATED COUNT: 48.9 FL (ref 35.9–50)
GLOBULIN SER CALC-MCNC: 2.7 G/DL (ref 1.9–3.5)
GLUCOSE SERPL-MCNC: 71 MG/DL (ref 65–99)
HCT VFR BLD AUTO: 29.7 % (ref 37–47)
HGB BLD-MCNC: 9.5 G/DL (ref 12–16)
IMM GRANULOCYTES # BLD AUTO: 0.05 K/UL (ref 0–0.11)
IMM GRANULOCYTES NFR BLD AUTO: 0.6 % (ref 0–0.9)
LYMPHOCYTES # BLD AUTO: 0.86 K/UL (ref 1–4.8)
LYMPHOCYTES NFR BLD: 10.7 % (ref 22–41)
MCH RBC QN AUTO: 30.5 PG (ref 27–33)
MCHC RBC AUTO-ENTMCNC: 32 G/DL (ref 33.6–35)
MCV RBC AUTO: 95.5 FL (ref 81.4–97.8)
MONOCYTES # BLD AUTO: 0.85 K/UL (ref 0–0.85)
MONOCYTES NFR BLD AUTO: 10.5 % (ref 0–13.4)
NEUTROPHILS # BLD AUTO: 6.12 K/UL (ref 2–7.15)
NEUTROPHILS NFR BLD: 75.9 % (ref 44–72)
NRBC # BLD AUTO: 0 K/UL
NRBC BLD-RTO: 0 /100 WBC
PHOSPHATE SERPL-MCNC: 5.6 MG/DL (ref 2.5–4.5)
PLATELET # BLD AUTO: 125 K/UL (ref 164–446)
PMV BLD AUTO: 10.7 FL (ref 9–12.9)
POTASSIUM SERPL-SCNC: 4.6 MMOL/L (ref 3.6–5.5)
PROT SERPL-MCNC: 6.3 G/DL (ref 6–8.2)
RBC # BLD AUTO: 3.11 M/UL (ref 4.2–5.4)
SODIUM SERPL-SCNC: 135 MMOL/L (ref 135–145)
WBC # BLD AUTO: 8.1 K/UL (ref 4.8–10.8)

## 2018-07-21 PROCEDURE — 85025 COMPLETE CBC W/AUTO DIFF WBC: CPT

## 2018-07-21 PROCEDURE — 700101 HCHG RX REV CODE 250: Performed by: HOSPITALIST

## 2018-07-21 PROCEDURE — 99233 SBSQ HOSP IP/OBS HIGH 50: CPT | Performed by: HOSPITALIST

## 2018-07-21 PROCEDURE — 700105 HCHG RX REV CODE 258: Performed by: HOSPITALIST

## 2018-07-21 PROCEDURE — 84100 ASSAY OF PHOSPHORUS: CPT

## 2018-07-21 PROCEDURE — 80053 COMPREHEN METABOLIC PANEL: CPT

## 2018-07-21 PROCEDURE — 770006 HCHG ROOM/CARE - MED/SURG/GYN SEMI*

## 2018-07-21 PROCEDURE — 700111 HCHG RX REV CODE 636 W/ 250 OVERRIDE (IP): Performed by: HOSPITALIST

## 2018-07-21 PROCEDURE — 700102 HCHG RX REV CODE 250 W/ 637 OVERRIDE(OP): Performed by: HOSPITALIST

## 2018-07-21 PROCEDURE — A9270 NON-COVERED ITEM OR SERVICE: HCPCS | Performed by: HOSPITALIST

## 2018-07-21 RX ORDER — GUAIFENESIN/DEXTROMETHORPHAN 100-10MG/5
5 SYRUP ORAL EVERY 6 HOURS PRN
Status: DISCONTINUED | OUTPATIENT
Start: 2018-07-21 | End: 2018-07-28 | Stop reason: HOSPADM

## 2018-07-21 RX ADMIN — PREGABALIN 100 MG: 100 CAPSULE ORAL at 12:53

## 2018-07-21 RX ADMIN — HEPARIN SODIUM 5000 UNITS: 5000 INJECTION, SOLUTION INTRAVENOUS; SUBCUTANEOUS at 21:37

## 2018-07-21 RX ADMIN — PREGABALIN 100 MG: 100 CAPSULE ORAL at 05:14

## 2018-07-21 RX ADMIN — STANDARDIZED SENNA CONCENTRATE AND DOCUSATE SODIUM 2 TABLET: 8.6; 5 TABLET, FILM COATED ORAL at 17:22

## 2018-07-21 RX ADMIN — PIPERACILLIN AND TAZOBACTAM 4.5 G: 4; .5 INJECTION, POWDER, LYOPHILIZED, FOR SOLUTION INTRAVENOUS; PARENTERAL at 17:23

## 2018-07-21 RX ADMIN — LIDOCAINE 1 PATCH: 50 PATCH TOPICAL at 05:14

## 2018-07-21 RX ADMIN — OXYCODONE HYDROCHLORIDE 20 MG: 10 TABLET ORAL at 02:35

## 2018-07-21 RX ADMIN — GUAIFENESIN AND DEXTROMETHORPHAN 5 ML: 100; 10 SYRUP ORAL at 17:26

## 2018-07-21 RX ADMIN — Medication 325 MG: at 05:14

## 2018-07-21 RX ADMIN — OXYCODONE HYDROCHLORIDE AND ACETAMINOPHEN 500 MG: 500 TABLET ORAL at 05:14

## 2018-07-21 RX ADMIN — CINACALCET HYDROCHLORIDE 30 MG: 30 TABLET, COATED ORAL at 21:36

## 2018-07-21 RX ADMIN — PROMETHAZINE HYDROCHLORIDE 25 MG: 25 TABLET ORAL at 17:26

## 2018-07-21 RX ADMIN — PREGABALIN 100 MG: 100 CAPSULE ORAL at 17:22

## 2018-07-21 RX ADMIN — HYDROXYCHLOROQUINE SULFATE 200 MG: 200 TABLET, FILM COATED ORAL at 21:37

## 2018-07-21 RX ADMIN — VITAMIN D, TAB 1000IU (100/BT) 10000 UNITS: 25 TAB at 05:14

## 2018-07-21 RX ADMIN — OXYCODONE HYDROCHLORIDE 20 MG: 10 TABLET ORAL at 09:17

## 2018-07-21 RX ADMIN — SEVELAMER CARBONATE 2400 MG: 800 TABLET, FILM COATED ORAL at 09:14

## 2018-07-21 RX ADMIN — OXYCODONE HYDROCHLORIDE 20 MG: 10 TABLET ORAL at 19:29

## 2018-07-21 RX ADMIN — HEPARIN SODIUM 5000 UNITS: 5000 INJECTION, SOLUTION INTRAVENOUS; SUBCUTANEOUS at 12:54

## 2018-07-21 RX ADMIN — BUPROPION HYDROCHLORIDE 150 MG: 150 TABLET, EXTENDED RELEASE ORAL at 05:14

## 2018-07-21 RX ADMIN — SEVELAMER CARBONATE 2400 MG: 800 TABLET, FILM COATED ORAL at 12:53

## 2018-07-21 RX ADMIN — PROMETHAZINE HYDROCHLORIDE 25 MG: 25 TABLET ORAL at 02:35

## 2018-07-21 RX ADMIN — SEVELAMER CARBONATE 2400 MG: 800 TABLET, FILM COATED ORAL at 17:22

## 2018-07-21 RX ADMIN — TRAZODONE HYDROCHLORIDE 50 MG: 50 TABLET ORAL at 21:36

## 2018-07-21 RX ADMIN — TIZANIDINE 8 MG: 4 TABLET ORAL at 21:36

## 2018-07-21 RX ADMIN — PIPERACILLIN AND TAZOBACTAM 4.5 G: 4; .5 INJECTION, POWDER, LYOPHILIZED, FOR SOLUTION INTRAVENOUS; PARENTERAL at 05:29

## 2018-07-21 ASSESSMENT — ENCOUNTER SYMPTOMS
TINGLING: 0
CHILLS: 0
MYALGIAS: 1
NAUSEA: 0
FEVER: 0
BLURRED VISION: 0
VOMITING: 0
HEARTBURN: 0
TREMORS: 0
SENSORY CHANGE: 0

## 2018-07-21 ASSESSMENT — PAIN SCALES - GENERAL
PAINLEVEL_OUTOF10: 0
PAINLEVEL_OUTOF10: 7
PAINLEVEL_OUTOF10: 6

## 2018-07-21 NOTE — PROGRESS NOTES
[facility] Hospitalist Progress Note    Date of Service: 2018    Chief Complaint  37 y.o. female admitted 2018 PmHx of SLE/Lupus nephritis, ESRD on HD who presents to the Emergency Department for evaluation of shortness of breath onset one month ago with progressing SOB for the last 7 days. Patient was admitted two months ago for pneumonia and treated with Augmentin at the moment of discharge. She stated fever of 103 °F fever a week ago.    Interval Problem Update  Patient admitted for HCAP  Breathing better, reports continued back pain and neck pain  Requesting increased pain medication    Consultants/Specialty  Nephrology    Disposition  Anticipate home in 2-3 days        Review of Systems   Constitutional: Negative for chills and fever.   Eyes: Negative for blurred vision.   Cardiovascular: Negative for chest pain.   Gastrointestinal: Negative for heartburn, nausea and vomiting.   Musculoskeletal: Positive for myalgias.   Skin: Negative for itching and rash.   Neurological: Negative for tingling, tremors and sensory change.   All other systems reviewed and are negative.     Physical Exam  Laboratory/Imaging   Hemodynamics  Temp (24hrs), Av.2 °C (98.9 °F), Min:37 °C (98.6 °F), Max:37.3 °C (99.1 °F)   Temperature: 37 °C (98.6 °F)  Pulse  Av.5  Min: 63  Max: 115    Blood Pressure: 133/72      Respiratory      Respiration: 16, Pulse Oximetry: 99 %        RUL Breath Sounds: Clear, RML Breath Sounds: Clear, RLL Breath Sounds: Diminished, LASHANDA Breath Sounds: Clear, LLL Breath Sounds: Diminished    Fluids    Intake/Output Summary (Last 24 hours) at 18 1454  Last data filed at 18 0900   Gross per 24 hour   Intake              560 ml   Output                0 ml   Net              560 ml       Nutrition  Orders Placed This Encounter   Procedures   • Diet Order Renal     Standing Status:   Standing     Number of Occurrences:   1     Order Specific Question:   Diet:     Answer:   Renal [8]      Physical Exam   Constitutional: She is oriented to person, place, and time. No distress.   HENT:   Head: Normocephalic and atraumatic.   Eyes: Conjunctivae and EOM are normal. No scleral icterus.   Neck: Neck supple.   Cardiovascular: Normal heart sounds.    No murmur heard.  Pulmonary/Chest: Effort normal. No stridor. No respiratory distress.   Abdominal: Soft. There is no tenderness. There is no rebound.   Musculoskeletal: Normal range of motion. She exhibits no edema or tenderness.   Neurological: She is alert and oriented to person, place, and time.   Skin: Skin is warm and dry. No rash noted. She is not diaphoretic.   Nursing note and vitals reviewed.      Recent Labs      07/20/18 0040 07/21/18   0524   WBC  6.7  8.1   RBC  3.24*  3.11*   HEMOGLOBIN  9.8*  9.5*   HEMATOCRIT  31.0*  29.7*   MCV  95.7  95.5   MCH  30.2  30.5   MCHC  31.6*  32.0*   RDW  48.7  48.9   PLATELETCT  120*  125*   MPV  10.6  10.7     Recent Labs      07/20/18 0040  07/21/18   0524   SODIUM  135  135   POTASSIUM  5.9*  4.6   CHLORIDE  101  98   CO2  20  24   GLUCOSE  80  71   BUN  43*  28*   CREATININE  10.87*  7.51*   CALCIUM  7.6*  7.6*     Recent Labs      07/20/18 0040   APTT  60.0*   INR  1.22*                  Assessment/Plan     * Pneumonia- (present on admission)   Assessment & Plan    - Patient presenting with fever, chill and productive cough not improving. Recently admitted for pneumonia. BC performed and were negative. Patient sent home with Augmentin for 10 days  - Will cover for HCAP  - BC/SC ordered  - Deescalation therapy upon results of culture  - Patient high risk for pseudomonas/MDR and MRSA.  Continue Zosyn.        Pleural effusion on left   Assessment & Plan    - Suspecting to be secondary to possible pneumonic process  - ? Needs thoracentesis        Chronic lupus nephritis (HCC)- (present on admission)   Assessment & Plan    Continue with home meds        Avascular necrosis of bones of both hips (HCC)-  (present on admission)   Assessment & Plan    -Continue with current dose of pain management  -Recommended not to perform adjustment.  Hx of opioid dependency        ESRD (end stage renal disease) on dialysis (HCC)- (present on admission)   Assessment & Plan    Patient with hx of lupus nephritis with ESRD on HD MWF. Patient missed last HD.  -Nephrology to follow for orders        Drug-seeking behavior- (present on admission)   Assessment & Plan    - Recommended no further adjustment(increase) on current dose.          Quality-Core Measures   Reviewed items::  Labs reviewed and Medications reviewed  Taylor catheter::  No Taylor

## 2018-07-21 NOTE — PROGRESS NOTES
Pharmacy Kinetics 37 y.o. female on vancomycin day # 2 2018    Currently on Vancomycin   ·  2100 mg @ 0800 (25 mg/kg load)    Indication for Treatment: HAP    Pertinent history per medical record: Admitted on 2018 for shortness of breath and chest pain.  Patient recently was admitted in  and treated for community acquired pneumonia and received IV antibiotics.  CT was negative for PE, and showed areas of atelectasis.  Broad spectrum antibiotics were initiated.  Of note, patient is ESRD on intermittent HD.    Other antibiotics:    Zosyn 4.5g IV q12h (-    Allergies: Lorazepam [ativan]; Morphine; and Seasonal     List concerns for renal function : ESRD on iHD MWF    Pertinent cultures to date:   · 18 - pBCx 2/ NGTD   ·  sputum- ordered --> needs collected  · 18 - MRSA nares - ordered --> needs collected      Recent Labs      18   0040  18   0524   WBC  6.7  8.1   NEUTSPOLYS  66.40  75.90*     Recent Labs      18   0040  18   0524   BUN  43*  28*   CREATININE  10.87*  7.51*   ALBUMIN  3.8  3.6     -ESRD on iHD- MWF  Intake/Output Summary (Last 24 hours) at 18 0849  Last data filed at 18 0600   Gross per 24 hour   Intake             1000 ml   Output             1500 ml   Net             -500 ml      Blood pressure 126/67, pulse 90, temperature (!) 38.1 °C (100.5 °F), resp. rate 16, weight 85.3 kg (188 lb 0.8 oz), SpO2 91 %, not currently breastfeeding. Temp (24hrs), Av.6 °C (99.7 °F), Min:37.1 °C (98.8 °F), Max:38.1 °C (100.5 °F)    A/P   1. Vancomycin dose change: continue pulse dosing  2. Next vancomycin level: 18 w/ AM labs (~48 hour level)  3. Goal trough: 16-20 mcg/mL  4. Comments: Spiked fever this morning of 100.5 F. WBC trending up, cultures pending. MRSA nares to be collected - recommend prompt de-escalation of vancomycin therapy if MRSA can be ruled out. Patient ESRD on iHD- MWF, thus will continue pulse dosing for vancomycin and  draw random 48 hour level tomorrow morning. Pharmacist will continue to monitor daily for changes in clinical status or renal function and adjust accordingly in addition to cultures and opportunities for de-escalation.     Chen Deshpande, Pharm.D

## 2018-07-21 NOTE — PROGRESS NOTES
"Patient is AOx4, speaks softly. Swaying side to side and back and forth. \"I haven't slept in days\". Educated patient on sleep measures such as repositioning, quiet back, lights off. Nodded understanding. Plan of care discussed. Nodded understanding. Complains of pain, medicated with scheduled medications see MAR. Tolerated all oral medications.  Call light in use,  treaded socks on, bed locked in low position  "

## 2018-07-21 NOTE — CARE PLAN
Problem: Pain Management  Goal: Pain level will decrease to patient's comfort goal  Outcome: PROGRESSING AS EXPECTED  Assess and manage pain per protocol and PRN . Medicate per MAR.

## 2018-07-21 NOTE — PROGRESS NOTES
Pt arrived to unit. Pt alert and oriented x4. Pt up with standby. Pt medicated for pain. VSS. Safety maintained. Belongings within reach. Bed in lowest position and call light within reach. Pt updated on plan of care and pt verbalized understanding. Will continue to monitor. Dialysis here to take pt.

## 2018-07-21 NOTE — PROGRESS NOTES
LATE ENTRY: Pt. Requesting to speak to with a psychiatric MD. Paged using , was told that service not receiving call on Saturday but will transfer this RN to their number. Left message to psych number.

## 2018-07-21 NOTE — PROGRESS NOTES
HD ordered by Dr. Gotti. Treatment started at 1608 and ended at 1908.     Total Net UF 1000 mL.    Pt tolerated treatment well with no issues. See flow sheet for details. Cannulation needles taken out, held pressure for 10 min and placed gauze dressing with no bleeding. SHELBI AVG + for bruit/thrill. Report given to primary RN.

## 2018-07-21 NOTE — CARE PLAN
Problem: Safety  Goal: Will remain free from falls  Pt. Receiving narcotics. Pt. A&OX4, refuses bed alarm. Bed kept low and locked, call light within reach, assisted as necessary.     Problem: Pain Management  Goal: Pain level will decrease to patient's comfort goal  With complaints of pleuritic L chest pain, medicated per MAR.

## 2018-07-21 NOTE — CARE PLAN
Problem: Safety  Goal: Will remain free from injury  Outcome: PROGRESSING AS EXPECTED  Safety maintained. Pt educated on fall prevention.

## 2018-07-21 NOTE — DIETARY
Nutrition note:  Pt is on a hepatic diet.  Unsure of reason for hepatic diet.   Pt to start on dialysis.  Phos is elevated at 5.6, BUN 28, creat 7.51.  Recommend change diet to renal.  Will d/w RN (attempted to call but did not answer).

## 2018-07-21 NOTE — PROGRESS NOTES
Received report from night shift RN, assumed care. Pt. Is awake, on bed. A&Ox4,  Stand by assist , with complaints of pleuritic L chest pain, medicated per MAR. Plan of care was safety, comfort and rest. Discussed plan of care. Call light and personal belongings within reach, bed kept low, treaded socks on. Assisted as necessary. Kept rested and comfortable at all times.

## 2018-07-22 LAB — VANCOMYCIN SERPL-MCNC: 24.8 UG/ML

## 2018-07-22 PROCEDURE — 770006 HCHG ROOM/CARE - MED/SURG/GYN SEMI*

## 2018-07-22 PROCEDURE — 700102 HCHG RX REV CODE 250 W/ 637 OVERRIDE(OP): Performed by: HOSPITALIST

## 2018-07-22 PROCEDURE — 700105 HCHG RX REV CODE 258: Performed by: HOSPITALIST

## 2018-07-22 PROCEDURE — 99233 SBSQ HOSP IP/OBS HIGH 50: CPT | Performed by: HOSPITALIST

## 2018-07-22 PROCEDURE — 700111 HCHG RX REV CODE 636 W/ 250 OVERRIDE (IP): Performed by: HOSPITALIST

## 2018-07-22 PROCEDURE — 87641 MR-STAPH DNA AMP PROBE: CPT

## 2018-07-22 PROCEDURE — 80202 ASSAY OF VANCOMYCIN: CPT

## 2018-07-22 PROCEDURE — 99233 SBSQ HOSP IP/OBS HIGH 50: CPT | Performed by: INTERNAL MEDICINE

## 2018-07-22 PROCEDURE — A9270 NON-COVERED ITEM OR SERVICE: HCPCS | Performed by: HOSPITALIST

## 2018-07-22 PROCEDURE — 700101 HCHG RX REV CODE 250: Performed by: HOSPITALIST

## 2018-07-22 RX ORDER — OXYCODONE HYDROCHLORIDE 5 MG/1
5 TABLET ORAL ONCE
Status: ACTIVE | OUTPATIENT
Start: 2018-07-22 | End: 2018-07-23

## 2018-07-22 RX ADMIN — PIPERACILLIN AND TAZOBACTAM 4.5 G: 4; .5 INJECTION, POWDER, LYOPHILIZED, FOR SOLUTION INTRAVENOUS; PARENTERAL at 18:30

## 2018-07-22 RX ADMIN — PROMETHAZINE HYDROCHLORIDE 25 MG: 25 TABLET ORAL at 03:42

## 2018-07-22 RX ADMIN — PREGABALIN 100 MG: 100 CAPSULE ORAL at 05:02

## 2018-07-22 RX ADMIN — LIDOCAINE 1 PATCH: 50 PATCH TOPICAL at 05:02

## 2018-07-22 RX ADMIN — PROMETHAZINE HYDROCHLORIDE 25 MG: 25 TABLET ORAL at 17:38

## 2018-07-22 RX ADMIN — VITAMIN D, TAB 1000IU (100/BT) 10000 UNITS: 25 TAB at 05:01

## 2018-07-22 RX ADMIN — Medication 325 MG: at 05:02

## 2018-07-22 RX ADMIN — BUPROPION HYDROCHLORIDE 150 MG: 150 TABLET, EXTENDED RELEASE ORAL at 08:47

## 2018-07-22 RX ADMIN — OXYCODONE HYDROCHLORIDE 20 MG: 10 TABLET ORAL at 09:47

## 2018-07-22 RX ADMIN — TRAZODONE HYDROCHLORIDE 50 MG: 50 TABLET ORAL at 21:27

## 2018-07-22 RX ADMIN — SEVELAMER CARBONATE 2400 MG: 800 TABLET, FILM COATED ORAL at 12:14

## 2018-07-22 RX ADMIN — TIZANIDINE 8 MG: 4 TABLET ORAL at 20:39

## 2018-07-22 RX ADMIN — OXYCODONE HYDROCHLORIDE 20 MG: 10 TABLET ORAL at 03:28

## 2018-07-22 RX ADMIN — PREGABALIN 100 MG: 100 CAPSULE ORAL at 12:14

## 2018-07-22 RX ADMIN — HEPARIN SODIUM 5000 UNITS: 5000 INJECTION, SOLUTION INTRAVENOUS; SUBCUTANEOUS at 05:01

## 2018-07-22 RX ADMIN — CINACALCET HYDROCHLORIDE 30 MG: 30 TABLET, COATED ORAL at 20:39

## 2018-07-22 RX ADMIN — PIPERACILLIN AND TAZOBACTAM 4.5 G: 4; .5 INJECTION, POWDER, LYOPHILIZED, FOR SOLUTION INTRAVENOUS; PARENTERAL at 05:01

## 2018-07-22 RX ADMIN — HYDROXYCHLOROQUINE SULFATE 200 MG: 200 TABLET, FILM COATED ORAL at 20:39

## 2018-07-22 RX ADMIN — STANDARDIZED SENNA CONCENTRATE AND DOCUSATE SODIUM 2 TABLET: 8.6; 5 TABLET, FILM COATED ORAL at 17:27

## 2018-07-22 RX ADMIN — STANDARDIZED SENNA CONCENTRATE AND DOCUSATE SODIUM 2 TABLET: 8.6; 5 TABLET, FILM COATED ORAL at 05:06

## 2018-07-22 RX ADMIN — HEPARIN SODIUM 5000 UNITS: 5000 INJECTION, SOLUTION INTRAVENOUS; SUBCUTANEOUS at 14:56

## 2018-07-22 RX ADMIN — HEPARIN SODIUM 5000 UNITS: 5000 INJECTION, SOLUTION INTRAVENOUS; SUBCUTANEOUS at 20:39

## 2018-07-22 RX ADMIN — OXYCODONE HYDROCHLORIDE 20 MG: 10 TABLET ORAL at 17:32

## 2018-07-22 RX ADMIN — PREGABALIN 100 MG: 100 CAPSULE ORAL at 17:24

## 2018-07-22 RX ADMIN — OXYCODONE HYDROCHLORIDE AND ACETAMINOPHEN 500 MG: 500 TABLET ORAL at 05:02

## 2018-07-22 RX ADMIN — SEVELAMER CARBONATE 2400 MG: 800 TABLET, FILM COATED ORAL at 17:24

## 2018-07-22 ASSESSMENT — PAIN SCALES - GENERAL
PAINLEVEL_OUTOF10: 7
PAINLEVEL_OUTOF10: 8
PAINLEVEL_OUTOF10: 7
PAINLEVEL_OUTOF10: 7
PAINLEVEL_OUTOF10: 6
PAINLEVEL_OUTOF10: 6
PAINLEVEL_OUTOF10: 8

## 2018-07-22 ASSESSMENT — PATIENT HEALTH QUESTIONNAIRE - PHQ9
1. LITTLE INTEREST OR PLEASURE IN DOING THINGS: NOT AT ALL
SUM OF ALL RESPONSES TO PHQ9 QUESTIONS 1 AND 2: 0

## 2018-07-22 ASSESSMENT — ENCOUNTER SYMPTOMS
SHORTNESS OF BREATH: 0
CHILLS: 0
BLURRED VISION: 0
FEVER: 0
TINGLING: 0
MYALGIAS: 1
HEARTBURN: 0
NAUSEA: 0
TREMORS: 0
VOMITING: 0
BACK PAIN: 1
NECK PAIN: 1
COUGH: 1
SENSORY CHANGE: 0

## 2018-07-22 NOTE — CARE PLAN
"Problem: Pain Management  Goal: Pain level will decrease to patient's comfort goal    Intervention: Educate and implement non-pharmacologic comfort measures. Examples: relaxation, distration, play therapy, activity therapy, massage, etc.  Heat pack and PRN pain meds given with good relief.       Problem: Psychosocial Needs:  Goal: Level of anxiety will decrease    Intervention: Identify and develop with patient strategies to cope with anxiety triggers  Patient has intermittent crying episodes throughout shift due to lack of sleep and wanting to make sure staff doesn't think she's \"crazy\". Continuing to reassure patient that staff is here to support her.         "

## 2018-07-22 NOTE — PROGRESS NOTES
Ordered MRSA swab from lab to complete order at next med pass. Patient lethargic today but oriented x4 and able to converse appropriatlely. Periods of forgetfulness. Pt ambulating to bathroom to urinate with cane. Pain well managed this shift.

## 2018-07-22 NOTE — PROGRESS NOTES
Pharmacy Kinetics 37 y.o. female on vancomycin day # 3 2018    Currently on Vancomycin   ·  2100 mg @ 0800 (25 mg/kg load)    Indication for Treatment: HAP    Pertinent history per medical record: Admitted on 2018 for shortness of breath and chest pain.  Patient recently was admitted in  and treated for community acquired pneumonia and received IV antibiotics.  CT was negative for PE, and showed areas of atelectasis.  Broad spectrum antibiotics were initiated.  Of note, patient is ESRD on intermittent HD.    Other antibiotics:    Zosyn 4.5g IV q12h (-    Allergies: Lorazepam [ativan]; Morphine; and Seasonal     List concerns for renal function : ESRD on iHD MWF    Pertinent cultures to date:   · 18 - pBCx 2/2 NGTD   ·  UA (-)  ·  sputum- ordered --> needs collected  · 18 - MRSA nares - ordered --> needs collected (called RN again today to collect)    Recent Labs      18   0040  18   0524   WBC  6.7  8.1   NEUTSPOLYS  66.40  75.90*     Recent Labs      18   0040  18   0524   BUN  43*  28*   CREATININE  10.87*  7.51*   ALBUMIN  3.8  3.6     Recent Labs      18   0336   VANCORANDOM  24.8     -ESRD on iHD- MWF  · Last session 18  · Next planned 18    Blood pressure 133/72, pulse 63, temperature 37 °C (98.6 °F), resp. rate 16, weight 85.9 kg (189 lb 6 oz), SpO2 99 %, not currently breastfeeding. Temp (24hrs), Av.2 °C (98.9 °F), Min:37 °C (98.6 °F), Max:37.3 °C (99.1 °F)    A/P   1. Vancomycin dose change: continue pulse dosing  2. Next vancomycin level: 18 with AM labs (~72 hour level)  3. Goal trough: 16-20 mcg/mL  4. Comments:   · Afebrile x24 hours. No new labs today. Cultures pending. MRSA nares never collected despite calling twice for collection.   · ESRD on iHD- MWF, session planned tomorrow.   · Vancomycin trough this morning elevated at 24.8 mcg/dL (~43.5 hour level). No additional dose today. Will draw 72 hour vancomycin  level tomorrow morning and continue pulse dosing vancomycin.   · Pharmacist will continue to monitor daily for changes in clinical status or renal function and adjust accordingly in addition to cultures and opportunities for de-escalation.     Chne Deshpande, Pharm.D

## 2018-07-22 NOTE — PROGRESS NOTES
Hospital Medicine Progress Note, Adult, Complex               Author: Spike Gotti Date & Time created: 2018  12:59 PM     Interval History:  37 year old with ESRD, came in with pleuritic chest pain. Felt to have pneumonia.    Dialysis has been on a MWF schedule. She is slightly confused today. + cough, denies SOB currently.    Review of Systems:  Review of Systems   Constitutional: Positive for malaise/fatigue.   Respiratory: Positive for cough. Negative for shortness of breath.    All other systems reviewed and are negative.      Physical Exam:  Physical Exam   Constitutional: She appears ill.   sleepy   Cardiovascular: Normal rate.    Pulmonary/Chest: Effort normal. No respiratory distress.   Abdominal: Soft. Bowel sounds are normal.   Musculoskeletal: She exhibits no edema or tenderness.   Skin: Skin is warm and dry.       Labs:        Invalid input(s): [id number]  Recent Labs      18   TROPONINI  <0.01     Recent Labs      18   0524   SODIUM  135  135   POTASSIUM  5.9*  4.6   CHLORIDE  101  98   CO2  20  24   BUN  43*  28*   CREATININE  10.87*  7.51*   PHOSPHORUS   --   5.6*   CALCIUM  7.6*  7.6*     Recent Labs      180  18   0524   ALTSGPT  5  <5   ASTSGOT  8*  8*   ALKPHOSPHAT  95  77   TBILIRUBIN  0.5  0.8   GLUCOSE  80  71     Recent Labs      180  18   0524   RBC  3.24*  3.11*   HEMOGLOBIN  9.8*  9.5*   HEMATOCRIT  31.0*  29.7*   PLATELETCT  120*  125*   PROTHROMBTM  15.1*   --    APTT  60.0*   --    INR  1.22*   --      Recent Labs      180  18   0524   WBC  6.7  8.1   NEUTSPOLYS  66.40  75.90*   LYMPHOCYTES  18.40*  10.70*   MONOCYTES  9.70  10.50   EOSINOPHILS  4.20  1.70   BASOPHILS  0.70  0.60   ASTSGOT  8*  8*   ALTSGPT  5  <5   ALKPHOSPHAT  95  77   TBILIRUBIN  0.5  0.8           Hemodynamics:  Temp (24hrs), Av.2 °C (98.9 °F), Min:37 °C (98.6 °F), Max:37.3 °C (99.1 °F)  Temperature: 37 °C  (98.6 °F)  Pulse  Av.5  Min: 63  Max: 115   Blood Pressure: 133/72     Respiratory:    Respiration: 16, Pulse Oximetry: 99 %        RUL Breath Sounds: Clear, RML Breath Sounds: Clear, RLL Breath Sounds: Diminished, LASHANDA Breath Sounds: Clear, LLL Breath Sounds: Diminished  Fluids:    Intake/Output Summary (Last 24 hours) at 18 1259  Last data filed at 18 0900   Gross per 24 hour   Intake              560 ml   Output                0 ml   Net              560 ml        GI/Nutrition:  Orders Placed This Encounter   Procedures   • Diet Order Renal     Standing Status:   Standing     Number of Occurrences:   1     Order Specific Question:   Diet:     Answer:   Renal [8]     Medical Decision Making, by Problem:  Active Hospital Problems    Diagnosis   • *Pneumonia [J18.9]   • Pleural effusion on left [J90]   • Lupus (systemic lupus erythematosus) (HCC) [M32.9]   • Chronic lupus nephritis (HCC) [M32.14]   • 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 ESRD - HD on a MWF schedule, to continue, next tomorrow  2. Pneumonia - on Treatment, has pleuritic chest pain, appears improving  3. Anemia CKD - start epogen, on iron    HD MWF    Quality-Core Measures

## 2018-07-22 NOTE — PROGRESS NOTES
Patient A&OX4 but very sleepy and unable to stay awake very long. Patient takes some time to answer questions but answers are appropriate. Patient able to sit up in bed appropriately. Continuing to teach importance of keeping oxygen in place and calling for help prior to getting out of bed to prevent fall. Central line flushed and patent, continuous fluids infusing to keep line open.

## 2018-07-22 NOTE — PROGRESS NOTES
[facility] Hospitalist Progress Note    Date of Service: 2018    Chief Complaint  37 y.o. female admitted 2018 PmHx of SLE/Lupus nephritis, ESRD on HD who presents to the Emergency Department for evaluation of shortness of breath onset one month ago with progressing SOB for the last 7 days. Patient was admitted two months ago for pneumonia and treated with Augmentin at the moment of discharge. She stated fever of 103 °F fever a week ago.    Interval Problem Update  Patient admitted for HCAP on Zosyn  Breathing better, reports continued back pain and neck pain  Requesting increased pain medication    Consultants/Specialty  Nephrology    Disposition  Anticipate home in 2-3 days        Review of Systems   Constitutional: Negative for chills and fever.   Eyes: Negative for blurred vision.   Cardiovascular: Positive for chest pain.   Gastrointestinal: Negative for heartburn, nausea and vomiting.   Musculoskeletal: Positive for back pain, joint pain, myalgias and neck pain.   Skin: Negative for itching and rash.   Neurological: Negative for tingling, tremors and sensory change.   All other systems reviewed and are negative.     Physical Exam  Laboratory/Imaging   Hemodynamics  Temp (24hrs), Av.2 °C (98.9 °F), Min:37 °C (98.6 °F), Max:37.3 °C (99.1 °F)   Temperature: 37 °C (98.6 °F)  Pulse  Av.5  Min: 63  Max: 115    Blood Pressure: 133/72      Respiratory      Respiration: 16, Pulse Oximetry: 99 %        RUL Breath Sounds: Clear, RML Breath Sounds: Clear, RLL Breath Sounds: Diminished, LASHANDA Breath Sounds: Clear, LLL Breath Sounds: Diminished    Fluids    Intake/Output Summary (Last 24 hours) at 18 1512  Last data filed at 18 1500   Gross per 24 hour   Intake              680 ml   Output                0 ml   Net              680 ml       Nutrition  Orders Placed This Encounter   Procedures   • Diet Order Renal     Standing Status:   Standing     Number of Occurrences:   1     Order Specific  Question:   Diet:     Answer:   Renal [8]     Physical Exam   Constitutional: She is oriented to person, place, and time. No distress.   HENT:   Head: Normocephalic and atraumatic.   Eyes: Conjunctivae and EOM are normal. No scleral icterus.   Neck: Neck supple.   Cardiovascular: Normal heart sounds.    No murmur heard.  Pulmonary/Chest: Effort normal. No stridor. No respiratory distress.   Abdominal: Soft. There is no tenderness. There is no rebound.   Musculoskeletal: Normal range of motion. She exhibits no edema or tenderness.   Neurological: She is alert and oriented to person, place, and time.   Skin: Skin is warm and dry. No rash noted. She is not diaphoretic.   Nursing note and vitals reviewed.      Recent Labs      07/20/18   0040  07/21/18   0524   WBC  6.7  8.1   RBC  3.24*  3.11*   HEMOGLOBIN  9.8*  9.5*   HEMATOCRIT  31.0*  29.7*   MCV  95.7  95.5   MCH  30.2  30.5   MCHC  31.6*  32.0*   RDW  48.7  48.9   PLATELETCT  120*  125*   MPV  10.6  10.7     Recent Labs      07/20/18   0040  07/21/18   0524   SODIUM  135  135   POTASSIUM  5.9*  4.6   CHLORIDE  101  98   CO2  20  24   GLUCOSE  80  71   BUN  43*  28*   CREATININE  10.87*  7.51*   CALCIUM  7.6*  7.6*     Recent Labs      07/20/18   0040   APTT  60.0*   INR  1.22*                  Assessment/Plan     * Pneumonia- (present on admission)   Assessment & Plan    - Patient presenting with fever, chill and productive cough not improving. Recently admitted for pneumonia. BC performed and were negative. Patient sent home with Augmentin for 10 days  - Now presenting with reported fever shortness of breath and chest pain  - Will cover for HCAP with Zosyn, vancomycin has been discontinued  - BC/SC ordered, follow-up  - Deescalation therapy upon results of culture  - Patient high risk for pseudomonas/MDR and MRSA.  Continue Zosyn.        Pleural effusion on left   Assessment & Plan    - Suspecting to be secondary to possible pneumonic process  - Review chest  x-ray, left-sided pleural effusion is small, no thoracentesis needed  - Continue antibiotics        Chronic lupus nephritis (HCC)- (present on admission)   Assessment & Plan    Continue with home meds        Avascular necrosis of bones of both hips (HCC)- (present on admission)   Assessment & Plan    -Continue with current dose of pain management  -Recommended not to perform adjustment.  Hx of opioid dependency        ESRD (end stage renal disease) on dialysis (HCC)- (present on admission)   Assessment & Plan    Patient with hx of lupus nephritis with ESRD on HD MWF. Patient missed last HD.  -Nephrology consulted to help with dialysis arrangements, appreciate help        Drug-seeking behavior- (present on admission)   Assessment & Plan    - Recommended no further adjustment(increase) on current dose.          Quality-Core Measures   Reviewed items::  Labs reviewed and Medications reviewed  Taylor catheter::  No Taylor

## 2018-07-22 NOTE — PROGRESS NOTES
Received bedside report and accepted care of patient. Patient currently resting in bed in no visible or stated signs of distress. Pt is A&O x 4. Power port dressing change was implemented with sterile technique. Pt was medicated per MAR with pain medication for level of 7/10 located on left back and chest area. Pain level has decreased to comfort goal. Bed controls on and bed in locked and lowest position. Call light and personal possessions within reach. Patient educated about use of call light and verbalized understanding.

## 2018-07-22 NOTE — CARE PLAN
Problem: Infection  Goal: Will remain free from infection  Outcome: PROGRESSING AS EXPECTED  Sterile dressing change for power port was completed. Hand washing was performed before and after patient contact.    Problem: Pain Management  Goal: Pain level will decrease to patient's comfort goal  Outcome: PROGRESSING AS EXPECTED  Pt was medicated per MAR for pain level of 7/10 located on Left chest. Pain level has decreased to comfort goal.

## 2018-07-23 LAB
ANION GAP SERPL CALC-SCNC: 15 MMOL/L (ref 0–11.9)
BASOPHILS # BLD AUTO: 0.6 % (ref 0–1.8)
BASOPHILS # BLD: 0.03 K/UL (ref 0–0.12)
BUN SERPL-MCNC: 20 MG/DL (ref 8–22)
CALCIUM SERPL-MCNC: 7.9 MG/DL (ref 8.5–10.5)
CHLORIDE SERPL-SCNC: 98 MMOL/L (ref 96–112)
CO2 SERPL-SCNC: 27 MMOL/L (ref 20–33)
CREAT SERPL-MCNC: 7.13 MG/DL (ref 0.5–1.4)
EOSINOPHIL # BLD AUTO: 0.19 K/UL (ref 0–0.51)
EOSINOPHIL NFR BLD: 4.1 % (ref 0–6.9)
ERYTHROCYTE [DISTWIDTH] IN BLOOD BY AUTOMATED COUNT: 46.7 FL (ref 35.9–50)
GLUCOSE SERPL-MCNC: 83 MG/DL (ref 65–99)
HCT VFR BLD AUTO: 28.9 % (ref 37–47)
HGB BLD-MCNC: 9.3 G/DL (ref 12–16)
IMM GRANULOCYTES # BLD AUTO: 0.02 K/UL (ref 0–0.11)
IMM GRANULOCYTES NFR BLD AUTO: 0.4 % (ref 0–0.9)
LYMPHOCYTES # BLD AUTO: 0.81 K/UL (ref 1–4.8)
LYMPHOCYTES NFR BLD: 17.4 % (ref 22–41)
MCH RBC QN AUTO: 30.1 PG (ref 27–33)
MCHC RBC AUTO-ENTMCNC: 32.2 G/DL (ref 33.6–35)
MCV RBC AUTO: 93.5 FL (ref 81.4–97.8)
MONOCYTES # BLD AUTO: 0.66 K/UL (ref 0–0.85)
MONOCYTES NFR BLD AUTO: 14.2 % (ref 0–13.4)
MRSA DNA SPEC QL NAA+PROBE: NORMAL
NEUTROPHILS # BLD AUTO: 2.94 K/UL (ref 2–7.15)
NEUTROPHILS NFR BLD: 63.3 % (ref 44–72)
NRBC # BLD AUTO: 0 K/UL
NRBC BLD-RTO: 0 /100 WBC
PLATELET # BLD AUTO: 132 K/UL (ref 164–446)
PMV BLD AUTO: 10.2 FL (ref 9–12.9)
POTASSIUM SERPL-SCNC: 4.5 MMOL/L (ref 3.6–5.5)
RBC # BLD AUTO: 3.09 M/UL (ref 4.2–5.4)
SIGNIFICANT IND 70042: NORMAL
SITE SITE: NORMAL
SODIUM SERPL-SCNC: 140 MMOL/L (ref 135–145)
SOURCE SOURCE: NORMAL
VANCOMYCIN SERPL-MCNC: 28 UG/ML
WBC # BLD AUTO: 4.7 K/UL (ref 4.8–10.8)

## 2018-07-23 PROCEDURE — 99233 SBSQ HOSP IP/OBS HIGH 50: CPT | Performed by: HOSPITALIST

## 2018-07-23 PROCEDURE — 700111 HCHG RX REV CODE 636 W/ 250 OVERRIDE (IP): Mod: JG | Performed by: HOSPITALIST

## 2018-07-23 PROCEDURE — 700111 HCHG RX REV CODE 636 W/ 250 OVERRIDE (IP)

## 2018-07-23 PROCEDURE — 700111 HCHG RX REV CODE 636 W/ 250 OVERRIDE (IP): Performed by: HOSPITALIST

## 2018-07-23 PROCEDURE — 306580 SET INFUSION,POWERLOC 20G X.75: Performed by: HOSPITALIST

## 2018-07-23 PROCEDURE — 5A1D70Z PERFORMANCE OF URINARY FILTRATION, INTERMITTENT, LESS THAN 6 HOURS PER DAY: ICD-10-PCS | Performed by: INTERNAL MEDICINE

## 2018-07-23 PROCEDURE — 700101 HCHG RX REV CODE 250: Performed by: HOSPITALIST

## 2018-07-23 PROCEDURE — 85025 COMPLETE CBC W/AUTO DIFF WBC: CPT

## 2018-07-23 PROCEDURE — 90935 HEMODIALYSIS ONE EVALUATION: CPT

## 2018-07-23 PROCEDURE — 700102 HCHG RX REV CODE 250 W/ 637 OVERRIDE(OP): Performed by: HOSPITALIST

## 2018-07-23 PROCEDURE — 80048 BASIC METABOLIC PNL TOTAL CA: CPT

## 2018-07-23 PROCEDURE — 770006 HCHG ROOM/CARE - MED/SURG/GYN SEMI*

## 2018-07-23 PROCEDURE — 700111 HCHG RX REV CODE 636 W/ 250 OVERRIDE (IP): Mod: JG | Performed by: INTERNAL MEDICINE

## 2018-07-23 PROCEDURE — 700105 HCHG RX REV CODE 258: Performed by: HOSPITALIST

## 2018-07-23 PROCEDURE — 90935 HEMODIALYSIS ONE EVALUATION: CPT | Performed by: INTERNAL MEDICINE

## 2018-07-23 PROCEDURE — A9270 NON-COVERED ITEM OR SERVICE: HCPCS | Performed by: HOSPITALIST

## 2018-07-23 PROCEDURE — 80202 ASSAY OF VANCOMYCIN: CPT

## 2018-07-23 RX ORDER — ACETAMINOPHEN 500 MG
1000 TABLET ORAL EVERY 6 HOURS
Status: DISCONTINUED | OUTPATIENT
Start: 2018-07-23 | End: 2018-07-23

## 2018-07-23 RX ORDER — HEPARIN SODIUM 1000 [USP'U]/ML
INJECTION, SOLUTION INTRAVENOUS; SUBCUTANEOUS
Status: COMPLETED
Start: 2018-07-23 | End: 2018-07-23

## 2018-07-23 RX ORDER — MELOXICAM 7.5 MG/1
7.5 TABLET ORAL
Status: DISCONTINUED | OUTPATIENT
Start: 2018-07-23 | End: 2018-07-23

## 2018-07-23 RX ORDER — PREGABALIN 25 MG/1
50 CAPSULE ORAL DAILY
Status: DISCONTINUED | OUTPATIENT
Start: 2018-07-24 | End: 2018-07-25

## 2018-07-23 RX ADMIN — ERYTHROPOIETIN 10000 UNITS: 10000 INJECTION, SOLUTION INTRAVENOUS; SUBCUTANEOUS at 09:08

## 2018-07-23 RX ADMIN — OXYCODONE HYDROCHLORIDE 20 MG: 10 TABLET ORAL at 12:46

## 2018-07-23 RX ADMIN — HEPARIN SODIUM 5000 UNITS: 5000 INJECTION, SOLUTION INTRAVENOUS; SUBCUTANEOUS at 04:43

## 2018-07-23 RX ADMIN — PROMETHAZINE HYDROCHLORIDE 25 MG: 25 TABLET ORAL at 12:46

## 2018-07-23 RX ADMIN — SEVELAMER CARBONATE 2400 MG: 800 TABLET, FILM COATED ORAL at 13:46

## 2018-07-23 RX ADMIN — HEPARIN SODIUM 2000 UNITS: 1000 INJECTION, SOLUTION INTRAVENOUS; SUBCUTANEOUS at 13:11

## 2018-07-23 RX ADMIN — PROMETHAZINE HYDROCHLORIDE 25 MG: 25 TABLET ORAL at 05:32

## 2018-07-23 RX ADMIN — PIPERACILLIN AND TAZOBACTAM 4.5 G: 4; .5 INJECTION, POWDER, LYOPHILIZED, FOR SOLUTION INTRAVENOUS; PARENTERAL at 17:24

## 2018-07-23 RX ADMIN — STANDARDIZED SENNA CONCENTRATE AND DOCUSATE SODIUM 2 TABLET: 8.6; 5 TABLET, FILM COATED ORAL at 17:25

## 2018-07-23 RX ADMIN — PREGABALIN 100 MG: 100 CAPSULE ORAL at 04:43

## 2018-07-23 RX ADMIN — HYDROXYCHLOROQUINE SULFATE 200 MG: 200 TABLET, FILM COATED ORAL at 21:53

## 2018-07-23 RX ADMIN — PROMETHAZINE HYDROCHLORIDE 25 MG: 25 TABLET ORAL at 18:48

## 2018-07-23 RX ADMIN — TRAZODONE HYDROCHLORIDE 50 MG: 50 TABLET ORAL at 21:54

## 2018-07-23 RX ADMIN — Medication 325 MG: at 04:43

## 2018-07-23 RX ADMIN — OXYCODONE HYDROCHLORIDE 20 MG: 10 TABLET ORAL at 05:31

## 2018-07-23 RX ADMIN — HEPARIN SODIUM 5000 UNITS: 5000 INJECTION, SOLUTION INTRAVENOUS; SUBCUTANEOUS at 15:18

## 2018-07-23 RX ADMIN — PIPERACILLIN AND TAZOBACTAM 4.5 G: 4; .5 INJECTION, POWDER, LYOPHILIZED, FOR SOLUTION INTRAVENOUS; PARENTERAL at 04:44

## 2018-07-23 RX ADMIN — STANDARDIZED SENNA CONCENTRATE AND DOCUSATE SODIUM 2 TABLET: 8.6; 5 TABLET, FILM COATED ORAL at 04:43

## 2018-07-23 RX ADMIN — TIZANIDINE 8 MG: 4 TABLET ORAL at 21:53

## 2018-07-23 RX ADMIN — ALTEPLASE 2 MG: 2.2 INJECTION, POWDER, LYOPHILIZED, FOR SOLUTION INTRAVENOUS at 09:22

## 2018-07-23 RX ADMIN — HEPARIN SODIUM 5000 UNITS: 5000 INJECTION, SOLUTION INTRAVENOUS; SUBCUTANEOUS at 21:54

## 2018-07-23 RX ADMIN — OXYCODONE HYDROCHLORIDE 20 MG: 10 TABLET ORAL at 18:48

## 2018-07-23 RX ADMIN — CINACALCET HYDROCHLORIDE 30 MG: 30 TABLET, COATED ORAL at 21:54

## 2018-07-23 RX ADMIN — LIDOCAINE 1 PATCH: 50 PATCH TOPICAL at 04:43

## 2018-07-23 RX ADMIN — BUPROPION HYDROCHLORIDE 150 MG: 150 TABLET, EXTENDED RELEASE ORAL at 05:03

## 2018-07-23 RX ADMIN — OXYCODONE HYDROCHLORIDE AND ACETAMINOPHEN 500 MG: 500 TABLET ORAL at 04:43

## 2018-07-23 ASSESSMENT — PAIN SCALES - GENERAL
PAINLEVEL_OUTOF10: 5
PAINLEVEL_OUTOF10: 10
PAINLEVEL_OUTOF10: 7
PAINLEVEL_OUTOF10: 10
PAINLEVEL_OUTOF10: 8

## 2018-07-23 ASSESSMENT — ENCOUNTER SYMPTOMS
TINGLING: 0
VOMITING: 0
NECK PAIN: 1
PALPITATIONS: 0
ABDOMINAL PAIN: 0
SHORTNESS OF BREATH: 0
BACK PAIN: 1
CHILLS: 0
FEVER: 0
BLURRED VISION: 0
HEARTBURN: 0
TREMORS: 0
NAUSEA: 0
MYALGIAS: 1
SENSORY CHANGE: 0
COUGH: 1
ORTHOPNEA: 0
HEMOPTYSIS: 0
WHEEZING: 0

## 2018-07-23 NOTE — CARE PLAN
Problem: Pain Management  Goal: Pain level will decrease to patient's comfort goal    Intervention: Educate and implement non-pharmacologic comfort measures. Examples: relaxation, distration, play therapy, activity therapy, massage, etc.  Rest and relaxation techniques implemented as non-pharmacological methods for pain management. Pt tolerated interventions well and pt is able to rest comfortably with decreased pain level.       Problem: Respiratory:  Goal: Respiratory status will improve  Outcome: PROGRESSING AS EXPECTED  Pt was administered 1.5 L O2 nasal cannula, SPO2% is 94% Pt needs frequent reminders to keep on nasal cannula. Pt verbalizes understanding.

## 2018-07-23 NOTE — PROGRESS NOTES
HD ordered by Dr. Samson. Treatment started at 1128 and ended at 1458.     Total Net UF 2008 mL.    Pt tolerated treatment well. See flow sheet for details. At 1200, pt's venous line came out and had to be re-cannulated. Treatment re-initiated at 12:30. Prior to treatment ending, tubing clotted with an EBL of 50 mL. Pt's VS normal. Cannulation needles taken out, held pressure for 10 min and placed gauze dressing with no bleeding. SHELBI AVG + for bruit/thrill. Report given to TRUONG Deshpande RN.

## 2018-07-23 NOTE — CARE PLAN
Problem: Infection  Goal: Will remain free from infection  Outcome: PROGRESSING AS EXPECTED  Pts port not flushing this AM, Alteplase used without success. Port access changed.     Problem: Psychosocial Needs:  Goal: Level of anxiety will decrease  Outcome: PROGRESSING AS EXPECTED  Pt stating feeling confused at times, pt educated about medications and procedures done. Pt stating education helping.

## 2018-07-23 NOTE — PROGRESS NOTES
Received bedside report and accepted care of patient. Patient currently resting in bed in no visible or stated signs of distress. Pt is A&O x 4. Bed  controls on and bed in locked and lowest position. Call light and personal possessions within reach. Patient educated about use of call light and verbalized understanding.

## 2018-07-24 ENCOUNTER — APPOINTMENT (OUTPATIENT)
Dept: PHYSICAL MEDICINE AND REHAB | Facility: MEDICAL CENTER | Age: 38
End: 2018-07-24
Payer: MEDICARE

## 2018-07-24 PROCEDURE — 700111 HCHG RX REV CODE 636 W/ 250 OVERRIDE (IP): Performed by: HOSPITALIST

## 2018-07-24 PROCEDURE — 770006 HCHG ROOM/CARE - MED/SURG/GYN SEMI*

## 2018-07-24 PROCEDURE — 99233 SBSQ HOSP IP/OBS HIGH 50: CPT | Performed by: FAMILY MEDICINE

## 2018-07-24 PROCEDURE — 700105 HCHG RX REV CODE 258: Performed by: HOSPITALIST

## 2018-07-24 PROCEDURE — A9270 NON-COVERED ITEM OR SERVICE: HCPCS | Performed by: HOSPITALIST

## 2018-07-24 PROCEDURE — 700101 HCHG RX REV CODE 250: Performed by: HOSPITALIST

## 2018-07-24 PROCEDURE — 700102 HCHG RX REV CODE 250 W/ 637 OVERRIDE(OP): Performed by: HOSPITALIST

## 2018-07-24 PROCEDURE — 99232 SBSQ HOSP IP/OBS MODERATE 35: CPT | Performed by: INTERNAL MEDICINE

## 2018-07-24 RX ADMIN — PROMETHAZINE HYDROCHLORIDE 25 MG: 25 TABLET ORAL at 12:11

## 2018-07-24 RX ADMIN — PIPERACILLIN AND TAZOBACTAM 4.5 G: 4; .5 INJECTION, POWDER, LYOPHILIZED, FOR SOLUTION INTRAVENOUS; PARENTERAL at 17:35

## 2018-07-24 RX ADMIN — SEVELAMER CARBONATE 2400 MG: 800 TABLET, FILM COATED ORAL at 08:58

## 2018-07-24 RX ADMIN — PREGABALIN 50 MG: 25 CAPSULE ORAL at 05:38

## 2018-07-24 RX ADMIN — CINACALCET HYDROCHLORIDE 30 MG: 30 TABLET, COATED ORAL at 20:04

## 2018-07-24 RX ADMIN — OXYCODONE HYDROCHLORIDE 20 MG: 10 TABLET ORAL at 17:42

## 2018-07-24 RX ADMIN — Medication 325 MG: at 05:40

## 2018-07-24 RX ADMIN — TRAZODONE HYDROCHLORIDE 50 MG: 50 TABLET ORAL at 20:04

## 2018-07-24 RX ADMIN — BUPROPION HYDROCHLORIDE 150 MG: 150 TABLET, EXTENDED RELEASE ORAL at 05:40

## 2018-07-24 RX ADMIN — OXYCODONE HYDROCHLORIDE AND ACETAMINOPHEN 500 MG: 500 TABLET ORAL at 05:39

## 2018-07-24 RX ADMIN — LIDOCAINE 1 PATCH: 50 PATCH TOPICAL at 05:40

## 2018-07-24 RX ADMIN — HYDROXYCHLOROQUINE SULFATE 200 MG: 200 TABLET, FILM COATED ORAL at 20:04

## 2018-07-24 RX ADMIN — OXYCODONE HYDROCHLORIDE 20 MG: 10 TABLET ORAL at 12:08

## 2018-07-24 RX ADMIN — SEVELAMER CARBONATE 2400 MG: 800 TABLET, FILM COATED ORAL at 20:03

## 2018-07-24 RX ADMIN — STANDARDIZED SENNA CONCENTRATE AND DOCUSATE SODIUM 2 TABLET: 8.6; 5 TABLET, FILM COATED ORAL at 05:40

## 2018-07-24 RX ADMIN — OXYCODONE HYDROCHLORIDE 20 MG: 10 TABLET ORAL at 05:39

## 2018-07-24 RX ADMIN — HEPARIN SODIUM 5000 UNITS: 5000 INJECTION, SOLUTION INTRAVENOUS; SUBCUTANEOUS at 05:40

## 2018-07-24 RX ADMIN — PROMETHAZINE HYDROCHLORIDE 25 MG: 25 TABLET ORAL at 05:39

## 2018-07-24 RX ADMIN — HEPARIN SODIUM 5000 UNITS: 5000 INJECTION, SOLUTION INTRAVENOUS; SUBCUTANEOUS at 20:06

## 2018-07-24 RX ADMIN — PROMETHAZINE HYDROCHLORIDE 25 MG: 25 TABLET ORAL at 17:42

## 2018-07-24 RX ADMIN — HEPARIN SODIUM 5000 UNITS: 5000 INJECTION, SOLUTION INTRAVENOUS; SUBCUTANEOUS at 13:46

## 2018-07-24 RX ADMIN — TIZANIDINE 8 MG: 4 TABLET ORAL at 20:04

## 2018-07-24 RX ADMIN — PIPERACILLIN AND TAZOBACTAM 4.5 G: 4; .5 INJECTION, POWDER, LYOPHILIZED, FOR SOLUTION INTRAVENOUS; PARENTERAL at 05:40

## 2018-07-24 RX ADMIN — SEVELAMER CARBONATE 2400 MG: 800 TABLET, FILM COATED ORAL at 13:48

## 2018-07-24 ASSESSMENT — PAIN SCALES - GENERAL
PAINLEVEL_OUTOF10: 6
PAINLEVEL_OUTOF10: 5
PAINLEVEL_OUTOF10: 7
PAINLEVEL_OUTOF10: 7
PAINLEVEL_OUTOF10: 4

## 2018-07-24 ASSESSMENT — ENCOUNTER SYMPTOMS
WHEEZING: 0
FEVER: 0
NECK PAIN: 1
ORTHOPNEA: 0
SHORTNESS OF BREATH: 0
HEMOPTYSIS: 0
ABDOMINAL PAIN: 0
CARDIOVASCULAR NEGATIVE: 1
NAUSEA: 0
NEUROLOGICAL NEGATIVE: 1
EYES NEGATIVE: 1
COUGH: 1
GASTROINTESTINAL NEGATIVE: 1
RESPIRATORY NEGATIVE: 1
BACK PAIN: 1
CONSTITUTIONAL NEGATIVE: 1
CHILLS: 0
VOMITING: 0
MYALGIAS: 1
PALPITATIONS: 0

## 2018-07-24 NOTE — PROGRESS NOTES
"MD paged. Pt actively hallucinating and confused. She state, \"I can't go to the bathroom. There are teachers there and that [place] damn, you know, the one that they build with the sticks. But I've had this pain from my small dog.\" Pt having flood of thoughts and unable to explain what she is trying to say. No labs have been drawn since 7/21      MD ordered CBC and BMP  "

## 2018-07-24 NOTE — CONSULTS
Palliative Care    General: Debby Carrizales is a 37 year old female admitted 7/19/18 with chest pain and progressive shortness of breath. She was recently admitted and treated for pneumonia 6/7/18-6/9/18. Past medical history is significant for SLE with Lupus nephritis diagnosed in her early 20's, ESRD on HD, fibromyalgia, depression, back pain requiring chronic use of steroid medications likely resulting in avascular necrosis of bilateral hips which has resulted in multiple surgeries by orthopedics, chronic pain for which she was seen by Dr. Giraldo in 01/2016 and methadone was suggested/started.     Problem list:   1. Chronic pain to multiple sites  3. SLE with chronic lupus nephritis  4. Fibromyalgia  5. ESRD on HD  6. Pneumonia  7. Left pleural effusion  8. Chronic recurrent infections   9. Depression  10. Anxiety   11. Avascular necrosis of bilateral hips  12. Opioid dependence   13. Drug seeking behavior    Assessment/Recommendations:  Reviewed records and discussed with Dr. Jayda Giraldo. Patient has chronic non malignant pain. Recommendations include:    - Methadone 2.5 mg PO BID without escalation before 4-6 days  - If methadone started, monitor EKG and assess for QTc prolongation; monitor electrolytes K and Mg  - Adjust oxycodone for better pain relief without increasing total daily dose to 10 mg Q 3 hours PRN pain  - Lower oxycodone as methadone takes therapeutic effect   - Refer to outpatient pain management for follow-up  - Opioids (oxycodone) should be titrated down as they will likely aggravate her fibromyalgia  - Could increase pregabalin to 75 mg daily (this is the highest renal dose)     Updated: Discussed assessment/recommendations with Dr. Ervin.    Thank you for allowing Palliative Care to participate in this patient's care. Please call our team with questions and/or additional needs.    Total records review/call time was 40 minutes.

## 2018-07-24 NOTE — PROGRESS NOTES
"Pt alert and intermittently confused. Stating that Lalo Moulton is present and saying that she sees teachers and \"that man that keeps doing it\" in her room. MD was notified overnight of patient's mental status. Labs were collected, but unable to determine cause. This nurse returned to patient's room approximately 45 mins after the confusion and she was completely appropriate. She states, \"My brain just won't right sometimes. I know I don't make sense.\" VSS on 1.5L BNC. /70   Pulse 67   Temp 36.8 °C (98.2 °F)   Resp 16   Wt 85.9 kg (189 lb 6 oz)   SpO2 97%   Breastfeeding? No   BMI 31.51 kg/m²  She complains of intermittent hip and back pain. Pain meds given per MAR. Port a cath remains accessed with no signs of complications. Dressing intact to L fistula. IV abx continued. Safety and fall precautions maintained. Bed in low position and call bell within reach. Will continue to monitor.  "

## 2018-07-24 NOTE — PROGRESS NOTES
"Pt reassessed for alert and oriented questions, pt Aox4. When discussing medications with pt, pt started telling this RN a story about her sister getting bit by a dog, pt stating, \"I get confused at times, I am not sure where I was going with that.\"   "

## 2018-07-24 NOTE — PROGRESS NOTES
Nephrology Progress Note, Adult, Complex               Author: Leana Sivakumar Date & Time created: 7/23/2018  5:50 PM     Interval History:  37 year old with ESRD, came in with pleuritic chest pain. Felt to have pneumonia.  Doing better  No acute events  No new complaints  Seen and examined during dialysis -tolerates well    Review of Systems:  Review of Systems   Constitutional: Positive for malaise/fatigue. Negative for chills and fever.   HENT: Negative.    Respiratory: Positive for cough. Negative for hemoptysis, shortness of breath and wheezing.    Cardiovascular: Negative for chest pain, palpitations, orthopnea and leg swelling.   Gastrointestinal: Negative for abdominal pain, nausea and vomiting.   Musculoskeletal: Positive for back pain, joint pain and myalgias.   Skin: Negative.    All other systems reviewed and are negative.      Physical Exam:  Physical Exam   Constitutional: She is oriented to person, place, and time. She appears ill.   sleepy   Cardiovascular: Normal rate.    Pulmonary/Chest: Effort normal. No respiratory distress. She has no wheezes. She has rales.   Abdominal: Soft. Bowel sounds are normal. She exhibits no distension. There is no tenderness.   Musculoskeletal: She exhibits no edema or tenderness.   Neurological: She is alert and oriented to person, place, and time. No cranial nerve deficit.   Skin: Skin is warm and dry.       Labs:        Invalid input(s): [id number]      Recent Labs      07/21/18 0524   SODIUM  135   POTASSIUM  4.6   CHLORIDE  98   CO2  24   BUN  28*   CREATININE  7.51*   PHOSPHORUS  5.6*   CALCIUM  7.6*     Recent Labs      07/21/18 0524   ALTSGPT  <5   ASTSGOT  8*   ALKPHOSPHAT  77   TBILIRUBIN  0.8   GLUCOSE  71     Recent Labs      07/21/18 0524   RBC  3.11*   HEMOGLOBIN  9.5*   HEMATOCRIT  29.7*   PLATELETCT  125*     Recent Labs      07/21/18 0524   WBC  8.1   NEUTSPOLYS  75.90*   LYMPHOCYTES  10.70*   MONOCYTES  10.50   EOSINOPHILS  1.70   BASOPHILS   0.60   ASTSGOT  8*   ALTSGPT  <5   ALKPHOSPHAT  77   TBILIRUBIN  0.8           Hemodynamics:  Temp (24hrs), Av.1 °C (98.7 °F), Min:36.9 °C (98.4 °F), Max:37.2 °C (99 °F)  Temperature: 37.2 °C (99 °F)  Pulse  Av.6  Min: 63  Max: 115   Blood Pressure: 149/97     Respiratory:    Respiration: 16, Pulse Oximetry: 94 %        RUL Breath Sounds: Clear, RML Breath Sounds: Clear, RLL Breath Sounds: Diminished, LASHANDA Breath Sounds: Clear, LLL Breath Sounds: Diminished  Fluids:    Intake/Output Summary (Last 24 hours) at 18 1750  Last data filed at 18 1458   Gross per 24 hour   Intake             1540 ml   Output             2708 ml   Net            -1168 ml        GI/Nutrition:  Orders Placed This Encounter   Procedures   • Diet Order Renal     Standing Status:   Standing     Number of Occurrences:   1     Order Specific Question:   Diet:     Answer:   Renal [8]     Medical Decision Making, by Problem:  Active Hospital Problems    Diagnosis   • *Pneumonia [J18.9]   • Pleural effusion on left [J90]   • Lupus (systemic lupus erythematosus) (HCC) [M32.9]   • Chronic lupus nephritis (HCC) [M32.14]   • A-V fistula (HCC) [I77.0]   • ESRD (end stage renal disease) on dialysis (HCC) [N18.6, Z99.2]   • Drug-seeking behavior [Z76.5]   • Opioid dependence ([facility]) [F11.20]   • Avascular necrosis of bones of both hips ([facility]) [M87.051, M87.052]     Assessment and plan    1. ESRD - HD on a MWF schedule -please see dialysis flow sheet for details  2. Pneumonia - on Treatment, has pleuritic chest pain, appears improving  3. Anemia CKD - start epogen, on iron    HD MWF    Quality-Core Measures   Reviewed items::  Labs reviewed and Medications reviewed

## 2018-07-24 NOTE — PROGRESS NOTES
[facility] Medicine Daily Progress Note    Date of Service  7/24/2018    Chief Complaint  37 y.o. female admitted 7/19/2018 with SOB. PmHx of SLE/Lupus nephritis, ESRD on HD who presents to the Emergency Department for evaluation of shortness of breath onset one month ago with progressing SOB for the last 7 days. Patient was admitted two months ago for pneumonia and treated with Augmentin at the moment of discharge. She stated fever of 103 °F fever a week ago. Patient was diagnosed with HCAP and started on Zosyn    Interval Problem Update  Patient again complaining of neck, back and joint pain. Asking for pain meds to be increased. Otherwise no other complaints. No acute episodes overnight per nursing staff    Consultants/Specialty  None    Disposition  PENDING    Review of Systems  Review of Systems   Constitutional: Negative.    HENT: Negative.    Eyes: Negative.    Respiratory: Negative.    Cardiovascular: Negative.    Gastrointestinal: Negative.    Musculoskeletal: Positive for back pain, joint pain and neck pain.   Neurological: Negative.    All other systems reviewed and are negative.       Physical Exam  Blood Pressure: 112/72   Temperature: 36.8 °C (98.3 °F)   Pulse: 74   Respiration: 16   Pulse Oximetry: 96 %     Physical Exam   Constitutional: She is oriented to person, place, and time. No distress.   HENT:   Head: Normocephalic and atraumatic.   Eyes: Pupils are equal, round, and reactive to light.   Neck: Normal range of motion. Neck supple.   Cardiovascular: Normal rate, regular rhythm and normal heart sounds.    Pulmonary/Chest: Effort normal and breath sounds normal.   Abdominal: Soft. Bowel sounds are normal.   Musculoskeletal: Normal range of motion.   Neurological: She is alert and oriented to person, place, and time.   Skin: She is not diaphoretic.   Vitals reviewed.      Fluids    Intake/Output Summary (Last 24 hours) at 07/24/18 1016  Last data filed at 07/24/18 1000   Gross per 24 hour   Intake              1420 ml   Output             2708 ml   Net            -1288 ml       Laboratory  Recent Labs      07/23/18   2145   WBC  4.7*   RBC  3.09*   HEMOGLOBIN  9.3*   HEMATOCRIT  28.9*   MCV  93.5   MCH  30.1   MCHC  32.2*   RDW  46.7   PLATELETCT  132*   MPV  10.2     Recent Labs      07/23/18   2145   SODIUM  140   POTASSIUM  4.5   CHLORIDE  98   CO2  27   GLUCOSE  83   BUN  20   CREATININE  7.13*   CALCIUM  7.9*                       Assessment/Plan  * Pneumonia- (present on admission)   Assessment & Plan    - Patient presenting with fever, chill and productive cough not improving. Recently admitted for pneumonia. BC performed and were negative. Patient sent home with Augmentin for 10 days  - Now presenting with reported fever shortness of breath and chest pain  - Will cover for HCAP with Zosyn  - Vancomycin was discontinued once MRSA ruled out  - BC/SC ordered, follow-up, so far no growth to date  - Deescalation therapy upon results of culture  - Patient high risk for pseudomonas/MDR and MRSA.  Continue Zosyn.        Pleural effusion on left- (present on admission)   Assessment & Plan    - Suspecting to be secondary to possible pneumonic process  - Review chest x-ray, left-sided pleural effusion is small, no thoracentesis needed  - Continue antibiotics        Chronic lupus nephritis (HCC)- (present on admission)   Assessment & Plan    Continue with home meds        Avascular necrosis of bones of both hips (HCC)- (present on admission)   Assessment & Plan    -Continue with current dose of pain management  -Recommended not to perform adjustment.  Hx of opioid dependency        ESRD (end stage renal disease) on dialysis (HCC)- (present on admission)   Assessment & Plan    Patient with hx of lupus nephritis with ESRD on HD MWF. Patient missed last HD.  -Nephrology consulted to help with dialysis arrangements, appreciate help        Drug-seeking behavior- (present on admission)   Assessment & Plan    - Recommended  no further adjustment(increase) on current dose.  - Extensive review of patient's past medical history, see the patient has been admitted here numerous times over the last 5-6 years, understandably patient has complex history and feels awful but cannot keep escalating medications  - Patient typically bargains or will play of her symptoms to request extra doses of opioids  - Palliative consulted for pain managment        Anxiety- (present on admission)   Assessment & Plan    Continue bupropion        Fibromyalgia- (present on admission)   Assessment & Plan    Patient complains of worsening back and flank pain  Continue current opioid regimen

## 2018-07-24 NOTE — ASSESSMENT & PLAN NOTE
- Patient complains of worsening back and flank pain  - Continue current opioid regimen per Palliative recommendations

## 2018-07-24 NOTE — PROGRESS NOTES
"Pt Aox4, pt answered all orientation questions appropriately. Pt stating, \"Sorry I say weird things at times.\" Pt eating breakfast this AM, morning medications given after. Pt up with stand by assist, pt steady, ne bed alarm needed. Pt running TKO to keep port patent.    "

## 2018-07-24 NOTE — PROGRESS NOTES
Nephrology Progress Note, Adult, Complex               Author: Leana Sivakumar Date & Time created: 2018  12:42 PM     Interval History:  37 year old with ESRD, came in with pleuritic chest pain. Felt to have pneumonia.  Doing better  No acute events  C/o fatigue    Review of Systems:  Review of Systems   Constitutional: Positive for malaise/fatigue. Negative for chills and fever.   HENT: Negative.    Respiratory: Positive for cough. Negative for hemoptysis, shortness of breath and wheezing.    Cardiovascular: Negative for chest pain, palpitations, orthopnea and leg swelling.   Gastrointestinal: Negative for abdominal pain, nausea and vomiting.   Musculoskeletal: Positive for back pain, joint pain and myalgias.   Skin: Negative.    All other systems reviewed and are negative.      Physical Exam:  Physical Exam   Constitutional: She is oriented to person, place, and time. She does not appear ill.   Cardiovascular: Normal rate.    Pulmonary/Chest: Effort normal. No respiratory distress. She has no wheezes. She has no rales.   Abdominal: Soft. Bowel sounds are normal. She exhibits no distension. There is no tenderness.   Musculoskeletal: She exhibits no edema or tenderness.   Neurological: She is alert and oriented to person, place, and time. No cranial nerve deficit.   Skin: Skin is warm and dry.       Labs:        Invalid input(s): [id number]      Recent Labs      18   214   SODIUM  140   POTASSIUM  4.5   CHLORIDE  98   CO2  27   BUN  20   CREATININE  7.13*   CALCIUM  7.9*     Recent Labs      18   214   GLUCOSE  83     Recent Labs      18   RBC  3.09*   HEMOGLOBIN  9.3*   HEMATOCRIT  28.9*   PLATELETCT  132*     Recent Labs      18   WBC  4.7*   NEUTSPOLYS  63.30   LYMPHOCYTES  17.40*   MONOCYTES  14.20*   EOSINOPHILS  4.10   BASOPHILS  0.60           Hemodynamics:  Temp (24hrs), Av.1 °C (98.8 °F), Min:36.8 °C (98.2 °F), Max:37.5 °C (99.5 °F)  Temperature: 36.8 °C  (98.3 °F)  Pulse  Av  Min: 63  Max: 115   Blood Pressure: 112/72     Respiratory:    Respiration: 16, Pulse Oximetry: 96 %        RUL Breath Sounds: Clear, RML Breath Sounds: Clear, RLL Breath Sounds: Diminished, LASHANDA Breath Sounds: Clear, LLL Breath Sounds: Diminished  Fluids:    Intake/Output Summary (Last 24 hours) at 18 1242  Last data filed at 18 1000   Gross per 24 hour   Intake             1660 ml   Output             2708 ml   Net            -1048 ml        GI/Nutrition:  Orders Placed This Encounter   Procedures   • Diet Order Renal     Standing Status:   Standing     Number of Occurrences:   1     Order Specific Question:   Diet:     Answer:   Renal [8]     Medical Decision Making, by Problem:  Active Hospital Problems    Diagnosis   • *Pneumonia [J18.9]   • Pleural effusion on left [J90]   • Lupus (systemic lupus erythematosus) ([facility]) [M32.9]   • Chronic lupus nephritis ([facility]) [M32.14]   • 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 ESRD - HD on a MWF schedule - will dialyze tomorrow  2. Pneumonia - on Treatment, has pleuritic chest pain, appears improving  3. Anemia CKD - on Epogen    HD MWF  Renal diet  Quality-Core Measures   Reviewed items::  Labs reviewed and Medications reviewed

## 2018-07-24 NOTE — CARE PLAN
Problem: Safety  Goal: Will remain free from injury  Outcome: PROGRESSING AS EXPECTED  Pts ambulation assessed, pt has no bed alarm in place, pt educated to call prior to ambulating if feeling dizzy.     Problem: Pain Management  Goal: Pain level will decrease to patient's comfort goal  Outcome: PROGRESSING AS EXPECTED  Pt stating pain, pt has no PRN pain medications available at this time.

## 2018-07-24 NOTE — PROGRESS NOTES
[facility] Hospitalist Progress Note    Date of Service: 2018    Chief Complaint  37 y.o. female admitted 2018 PmHx of SLE/Lupus nephritis, ESRD on HD who presents to the Emergency Department for evaluation of shortness of breath onset one month ago with progressing SOB for the last 7 days. Patient was admitted two months ago for pneumonia and treated with Augmentin at the moment of discharge. She stated fever of 103 °F fever a week ago.    Interval Problem Update  Patient admitted for HCAP on Zosyn  Breathing better, reports continued back pain and neck pain  Requesting increased pain medication    Consultants/Specialty  Nephrology    Disposition  Anticipate home in 2-3 days        Review of Systems   Constitutional: Negative for chills and fever.   Eyes: Negative for blurred vision.   Cardiovascular: Positive for chest pain.   Gastrointestinal: Negative for heartburn, nausea and vomiting.   Musculoskeletal: Positive for back pain, joint pain, myalgias and neck pain.   Skin: Negative for itching and rash.   Neurological: Negative for tingling, tremors and sensory change.   All other systems reviewed and are negative.     Physical Exam  Laboratory/Imaging   Hemodynamics  Temp (24hrs), Av.1 °C (98.7 °F), Min:36.9 °C (98.4 °F), Max:37.2 °C (99 °F)   Temperature: 37.2 °C (99 °F)  Pulse  Av.6  Min: 63  Max: 115    Blood Pressure: 149/97      Respiratory      Respiration: 16, Pulse Oximetry: 94 %        RUL Breath Sounds: Clear, RML Breath Sounds: Clear, RLL Breath Sounds: Diminished, LASHANDA Breath Sounds: Clear, LLL Breath Sounds: Diminished    Fluids    Intake/Output Summary (Last 24 hours) at 18 1724  Last data filed at 18 1458   Gross per 24 hour   Intake             1540 ml   Output             2708 ml   Net            -1168 ml       Nutrition  Orders Placed This Encounter   Procedures   • Diet Order Renal     Standing Status:   Standing     Number of Occurrences:   1     Order Specific  Question:   Diet:     Answer:   Renal [8]     Physical Exam   Constitutional: She is oriented to person, place, and time. No distress.   HENT:   Head: Normocephalic and atraumatic.   Eyes: Conjunctivae and EOM are normal. No scleral icterus.   Neck: Neck supple.   Cardiovascular: Normal heart sounds.    No murmur heard.  Pulmonary/Chest: Effort normal. No stridor. No respiratory distress.   Abdominal: Soft. There is no tenderness. There is no rebound.   Musculoskeletal: Normal range of motion. She exhibits no edema or tenderness.   Neurological: She is alert and oriented to person, place, and time.   Skin: Skin is warm and dry. No rash noted. She is not diaphoretic.   Nursing note and vitals reviewed.      Recent Labs      07/21/18   0524   WBC  8.1   RBC  3.11*   HEMOGLOBIN  9.5*   HEMATOCRIT  29.7*   MCV  95.5   MCH  30.5   MCHC  32.0*   RDW  48.9   PLATELETCT  125*   MPV  10.7     Recent Labs      07/21/18   0524   SODIUM  135   POTASSIUM  4.6   CHLORIDE  98   CO2  24   GLUCOSE  71   BUN  28*   CREATININE  7.51*   CALCIUM  7.6*                      Assessment/Plan     * Pneumonia- (present on admission)   Assessment & Plan    - Patient presenting with fever, chill and productive cough not improving. Recently admitted for pneumonia. BC performed and were negative. Patient sent home with Augmentin for 10 days  - Now presenting with reported fever shortness of breath and chest pain  - Will cover for HCAP with Zosyn, vancomycin was discontinued once MRSA ruled out  - BC/SC ordered, follow-up, so far no growth to date  - Deescalation therapy upon results of culture  - Patient high risk for pseudomonas/MDR and MRSA.  Continue Zosyn.        Pleural effusion on left- (present on admission)   Assessment & Plan    - Suspecting to be secondary to possible pneumonic process  - Review chest x-ray, left-sided pleural effusion is small, no thoracentesis needed  - Continue antibiotics        Chronic lupus nephritis (HCC)-  (present on admission)   Assessment & Plan    Continue with home meds        Avascular necrosis of bones of both hips (HCC)- (present on admission)   Assessment & Plan    -Continue with current dose of pain management  -Recommended not to perform adjustment.  Hx of opioid dependency        ESRD (end stage renal disease) on dialysis (HCC)- (present on admission)   Assessment & Plan    Patient with hx of lupus nephritis with ESRD on HD MWF. Patient missed last HD.  -Nephrology consulted to help with dialysis arrangements, appreciate help        Drug-seeking behavior- (present on admission)   Assessment & Plan    - Recommended no further adjustment(increase) on current dose.  - Extensive review of patient's past medical history, see the patient has been admitted here numerous times over the last 5-6 years, understandably patient has complex history and feels awful but cannot keep escalating medications  - Patient typically bargains or will play of her symptoms to request extra doses of opioids    Consider palliative care consult?        Anxiety- (present on admission)   Assessment & Plan    Continue bupropion        Fibromyalgia- (present on admission)   Assessment & Plan    Patient complains of worsening back and flank pain  Continue current opioid regimen          Quality-Core Measures   Reviewed items::  Labs reviewed and Medications reviewed  Taylor catheter::  No Taylor

## 2018-07-25 LAB
BACTERIA BLD CULT: NORMAL
BACTERIA BLD CULT: NORMAL
DSDNA AB TITR SER CLIF: DETECTED {TITER}
DSDNA IGG TITR SER CLIF: ABNORMAL {TITER}
ENA SM IGG SER-ACNC: 58 AU/ML (ref 0–40)
ENA SS-B IGG SER IA-ACNC: 2 AU/ML (ref 0–40)
NUCLEAR IGG SER QL IA: DETECTED
NUCLEAR IGG TITR SER IF: ABNORMAL {TITER}
SIGNIFICANT IND 70042: NORMAL
SIGNIFICANT IND 70042: NORMAL
SITE SITE: NORMAL
SITE SITE: NORMAL
SOURCE SOURCE: NORMAL
SOURCE SOURCE: NORMAL
SSA52 R0ENA AB IGG Q0420: 6 AU/ML (ref 0–40)
SSA60 R0ENA AB IGG Q0419: 40 AU/ML (ref 0–40)
U1 SNRNP IGG SER QL: 33 AU/ML (ref 0–40)

## 2018-07-25 PROCEDURE — 700102 HCHG RX REV CODE 250 W/ 637 OVERRIDE(OP): Performed by: FAMILY MEDICINE

## 2018-07-25 PROCEDURE — 5A1D70Z PERFORMANCE OF URINARY FILTRATION, INTERMITTENT, LESS THAN 6 HOURS PER DAY: ICD-10-PCS | Performed by: INTERNAL MEDICINE

## 2018-07-25 PROCEDURE — 700111 HCHG RX REV CODE 636 W/ 250 OVERRIDE (IP): Mod: JG | Performed by: INTERNAL MEDICINE

## 2018-07-25 PROCEDURE — 700102 HCHG RX REV CODE 250 W/ 637 OVERRIDE(OP): Performed by: HOSPITALIST

## 2018-07-25 PROCEDURE — A9270 NON-COVERED ITEM OR SERVICE: HCPCS | Performed by: HOSPITALIST

## 2018-07-25 PROCEDURE — 99232 SBSQ HOSP IP/OBS MODERATE 35: CPT | Performed by: FAMILY MEDICINE

## 2018-07-25 PROCEDURE — 700111 HCHG RX REV CODE 636 W/ 250 OVERRIDE (IP): Performed by: HOSPITALIST

## 2018-07-25 PROCEDURE — 770006 HCHG ROOM/CARE - MED/SURG/GYN SEMI*

## 2018-07-25 PROCEDURE — A9270 NON-COVERED ITEM OR SERVICE: HCPCS | Performed by: FAMILY MEDICINE

## 2018-07-25 PROCEDURE — 307059 PAD,EAR PROTECTOR: Performed by: FAMILY MEDICINE

## 2018-07-25 PROCEDURE — 700105 HCHG RX REV CODE 258: Performed by: HOSPITALIST

## 2018-07-25 PROCEDURE — 90935 HEMODIALYSIS ONE EVALUATION: CPT | Performed by: INTERNAL MEDICINE

## 2018-07-25 PROCEDURE — 700111 HCHG RX REV CODE 636 W/ 250 OVERRIDE (IP): Performed by: FAMILY MEDICINE

## 2018-07-25 PROCEDURE — 90935 HEMODIALYSIS ONE EVALUATION: CPT

## 2018-07-25 PROCEDURE — 700101 HCHG RX REV CODE 250: Performed by: HOSPITALIST

## 2018-07-25 PROCEDURE — 700111 HCHG RX REV CODE 636 W/ 250 OVERRIDE (IP)

## 2018-07-25 RX ORDER — HEPARIN SODIUM 1000 [USP'U]/ML
INJECTION, SOLUTION INTRAVENOUS; SUBCUTANEOUS
Status: COMPLETED
Start: 2018-07-25 | End: 2018-07-25

## 2018-07-25 RX ORDER — ONDANSETRON 2 MG/ML
4 INJECTION INTRAMUSCULAR; INTRAVENOUS EVERY 6 HOURS PRN
Status: DISCONTINUED | OUTPATIENT
Start: 2018-07-25 | End: 2018-07-28 | Stop reason: HOSPADM

## 2018-07-25 RX ORDER — PREGABALIN 25 MG/1
25 CAPSULE ORAL ONCE
Status: COMPLETED | OUTPATIENT
Start: 2018-07-25 | End: 2018-07-25

## 2018-07-25 RX ORDER — METHYLPREDNISOLONE SODIUM SUCCINATE 40 MG/ML
40 INJECTION, POWDER, LYOPHILIZED, FOR SOLUTION INTRAMUSCULAR; INTRAVENOUS EVERY 12 HOURS
Status: DISCONTINUED | OUTPATIENT
Start: 2018-07-25 | End: 2018-07-28 | Stop reason: HOSPADM

## 2018-07-25 RX ORDER — PREGABALIN 75 MG/1
75 CAPSULE ORAL DAILY
Status: DISCONTINUED | OUTPATIENT
Start: 2018-07-26 | End: 2018-07-28 | Stop reason: HOSPADM

## 2018-07-25 RX ORDER — METHADONE HYDROCHLORIDE 10 MG/1
2.5 TABLET ORAL 2 TIMES DAILY
Status: DISCONTINUED | OUTPATIENT
Start: 2018-07-25 | End: 2018-07-28 | Stop reason: HOSPADM

## 2018-07-25 RX ADMIN — TIZANIDINE 8 MG: 4 TABLET ORAL at 21:59

## 2018-07-25 RX ADMIN — Medication 325 MG: at 05:22

## 2018-07-25 RX ADMIN — TRAZODONE HYDROCHLORIDE 50 MG: 50 TABLET ORAL at 21:59

## 2018-07-25 RX ADMIN — PROMETHAZINE HYDROCHLORIDE 25 MG: 25 TABLET ORAL at 18:27

## 2018-07-25 RX ADMIN — OXYCODONE HYDROCHLORIDE 20 MG: 10 TABLET ORAL at 12:06

## 2018-07-25 RX ADMIN — ONDANSETRON HYDROCHLORIDE 4 MG: 2 INJECTION, SOLUTION INTRAMUSCULAR; INTRAVENOUS at 20:21

## 2018-07-25 RX ADMIN — METHADONE HYDROCHLORIDE 2.5 MG: 10 TABLET ORAL at 12:06

## 2018-07-25 RX ADMIN — ERYTHROPOIETIN 10000 UNITS: 10000 INJECTION, SOLUTION INTRAVENOUS; SUBCUTANEOUS at 07:37

## 2018-07-25 RX ADMIN — HEPARIN SODIUM 5000 UNITS: 5000 INJECTION, SOLUTION INTRAVENOUS; SUBCUTANEOUS at 05:22

## 2018-07-25 RX ADMIN — METHYLPREDNISOLONE SODIUM SUCCINATE 40 MG: 40 INJECTION, POWDER, FOR SOLUTION INTRAMUSCULAR; INTRAVENOUS at 18:24

## 2018-07-25 RX ADMIN — LIDOCAINE 1 PATCH: 50 PATCH TOPICAL at 05:22

## 2018-07-25 RX ADMIN — SEVELAMER CARBONATE 2400 MG: 800 TABLET, FILM COATED ORAL at 18:23

## 2018-07-25 RX ADMIN — HEPARIN SODIUM 2000 UNITS: 1000 INJECTION, SOLUTION INTRAVENOUS; SUBCUTANEOUS at 08:06

## 2018-07-25 RX ADMIN — PREGABALIN 50 MG: 25 CAPSULE ORAL at 05:22

## 2018-07-25 RX ADMIN — PIPERACILLIN AND TAZOBACTAM 4.5 G: 4; .5 INJECTION, POWDER, LYOPHILIZED, FOR SOLUTION INTRAVENOUS; PARENTERAL at 06:00

## 2018-07-25 RX ADMIN — BUPROPION HYDROCHLORIDE 150 MG: 150 TABLET, EXTENDED RELEASE ORAL at 05:22

## 2018-07-25 RX ADMIN — OXYCODONE HYDROCHLORIDE AND ACETAMINOPHEN 500 MG: 500 TABLET ORAL at 05:22

## 2018-07-25 RX ADMIN — SEVELAMER CARBONATE 2400 MG: 800 TABLET, FILM COATED ORAL at 07:36

## 2018-07-25 RX ADMIN — CINACALCET HYDROCHLORIDE 30 MG: 30 TABLET, COATED ORAL at 20:13

## 2018-07-25 RX ADMIN — HEPARIN SODIUM 5000 UNITS: 5000 INJECTION, SOLUTION INTRAVENOUS; SUBCUTANEOUS at 20:13

## 2018-07-25 RX ADMIN — PROMETHAZINE HYDROCHLORIDE 25 MG: 25 TABLET ORAL at 12:10

## 2018-07-25 RX ADMIN — HEPARIN SODIUM 5000 UNITS: 5000 INJECTION, SOLUTION INTRAVENOUS; SUBCUTANEOUS at 13:38

## 2018-07-25 RX ADMIN — VITAMIN D, TAB 1000IU (100/BT) 10000 UNITS: 25 TAB at 05:22

## 2018-07-25 RX ADMIN — PREGABALIN 25 MG: 25 CAPSULE ORAL at 12:10

## 2018-07-25 RX ADMIN — PIPERACILLIN AND TAZOBACTAM 4.5 G: 4; .5 INJECTION, POWDER, LYOPHILIZED, FOR SOLUTION INTRAVENOUS; PARENTERAL at 18:27

## 2018-07-25 RX ADMIN — OXYCODONE HYDROCHLORIDE 20 MG: 10 TABLET ORAL at 05:25

## 2018-07-25 RX ADMIN — ONDANSETRON HYDROCHLORIDE 4 MG: 2 INJECTION, SOLUTION INTRAMUSCULAR; INTRAVENOUS at 13:34

## 2018-07-25 RX ADMIN — STANDARDIZED SENNA CONCENTRATE AND DOCUSATE SODIUM 2 TABLET: 8.6; 5 TABLET, FILM COATED ORAL at 18:23

## 2018-07-25 RX ADMIN — OXYCODONE HYDROCHLORIDE 20 MG: 10 TABLET ORAL at 18:22

## 2018-07-25 RX ADMIN — HYDROXYCHLOROQUINE SULFATE 200 MG: 200 TABLET, FILM COATED ORAL at 20:13

## 2018-07-25 ASSESSMENT — PAIN SCALES - GENERAL
PAINLEVEL_OUTOF10: 9
PAINLEVEL_OUTOF10: 5
PAINLEVEL_OUTOF10: 7
PAINLEVEL_OUTOF10: 7

## 2018-07-25 ASSESSMENT — ENCOUNTER SYMPTOMS
EYES NEGATIVE: 1
PALPITATIONS: 0
VOMITING: 0
MYALGIAS: 1
COUGH: 0
SHORTNESS OF BREATH: 0
HEMOPTYSIS: 0
GASTROINTESTINAL NEGATIVE: 1
NAUSEA: 0
CARDIOVASCULAR NEGATIVE: 1
ORTHOPNEA: 0
NEUROLOGICAL NEGATIVE: 1
FEVER: 0
CONSTITUTIONAL NEGATIVE: 1
WHEEZING: 0
RESPIRATORY NEGATIVE: 1
CHILLS: 0
ABDOMINAL PAIN: 0
BACK PAIN: 1
NECK PAIN: 1

## 2018-07-25 NOTE — PROGRESS NOTES
Nephrology Progress Note, Adult, Complex               Author: Leana Sivakumar Date & Time created: 2018  12:53 PM     Interval History:  37 year old with ESRD, came in with pleuritic chest pain. Felt to have pneumonia.  Doing better  No acute events  No new complaints  Seen and examined during dialysis -tolerates well    Review of Systems:  Review of Systems   Constitutional: Positive for malaise/fatigue. Negative for chills and fever.   HENT: Negative.    Respiratory: Negative for cough, hemoptysis, shortness of breath and wheezing.    Cardiovascular: Negative for chest pain, palpitations, orthopnea and leg swelling.   Gastrointestinal: Negative for abdominal pain, nausea and vomiting.   Musculoskeletal: Positive for back pain, joint pain and myalgias.   Skin: Negative.    All other systems reviewed and are negative.      Physical Exam:  Physical Exam   Constitutional: She is oriented to person, place, and time.   Cardiovascular: Normal rate.    Pulmonary/Chest: Effort normal. No respiratory distress. She has no wheezes. She has no rales.   Abdominal: Soft. Bowel sounds are normal. She exhibits no distension. There is no tenderness.   Musculoskeletal: She exhibits no edema or tenderness.   Neurological: She is alert and oriented to person, place, and time. No cranial nerve deficit.   Skin: Skin is warm and dry.       Labs:        Invalid input(s): [id number]      Recent Labs      18   214   SODIUM  140   POTASSIUM  4.5   CHLORIDE  98   CO2  27   BUN  20   CREATININE  7.13*   CALCIUM  7.9*     Recent Labs      18   GLUCOSE  83     Recent Labs      18   RBC  3.09*   HEMOGLOBIN  9.3*   HEMATOCRIT  28.9*   PLATELETCT  132*     Recent Labs      18   WBC  4.7*   NEUTSPOLYS  63.30   LYMPHOCYTES  17.40*   MONOCYTES  14.20*   EOSINOPHILS  4.10   BASOPHILS  0.60           Hemodynamics:  Temp (24hrs), Av.8 °C (98.2 °F), Min:36.5 °C (97.7 °F), Max:37 °C (98.6  °F)  Temperature: 36.9 °C (98.4 °F)  Pulse  Av.6  Min: 60  Max: 115   Blood Pressure: 124/77     Respiratory:    Respiration: 18, Pulse Oximetry: 90 %        RUL Breath Sounds: Clear, RML Breath Sounds: Clear, RLL Breath Sounds: Diminished, LASHANDA Breath Sounds: Clear, LLL Breath Sounds: Diminished  Fluids:    Intake/Output Summary (Last 24 hours) at 18 1253  Last data filed at 18 1107   Gross per 24 hour   Intake             1320 ml   Output             1500 ml   Net             -180 ml        GI/Nutrition:  Orders Placed This Encounter   Procedures   • Diet Order Renal     Standing Status:   Standing     Number of Occurrences:   1     Order Specific Question:   Diet:     Answer:   Renal [8]     Medical Decision Making, by Problem:  Active Hospital Problems    Diagnosis   • *Pneumonia [J18.9]   • Pleural effusion on left [J90]   • Lupus (systemic lupus erythematosus) ([facility]) [M32.9]   • Chronic lupus nephritis ([facility]) [M32.14]   • 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 ESRD - HD on a MWF schedule -please see dialysis flow sheet for details  2. Pneumonia  -abx to renal doses  3. Anemia CKD - on epogen  4.HTN: BP well controlled  5.Electrolytes well controlled  HD [facility]    Quality-Core Measures   Reviewed items::  Labs reviewed and Medications reviewed

## 2018-07-25 NOTE — DISCHARGE SUMMARY
Discharge Summary    CHIEF COMPLAINT ON ADMISSION  Chief Complaint   Patient presents with   • Shortness of Breath     Pt. states that around New Year's pt had sharp pain in her back towards her kidneys. Pt. states she was recently admitted for pneumonia and no has sharp stabbing chest pain in her left chest. Pt. states hx of lupus which ended up with her being intubated and having a tracheostomy placed.   • Chest Pain     Pt. states sharp, stabbing chest pain on her left side.   • Chronic Renal Failure     Pt. states end stage renal failure. Pt. states MWF dialysis stating she did miss yesterday's session, and is now having urinary retention.       Reason for Admission  Shortness of breath/ chest pain/ s*     Admission Date  7/28/18    CODE STATUS  Full Code    HPI & HOSPITAL COURSE  This is a 37 y.o. female here with  SOB. PmHx of SLE/Lupus nephritis, ESRD on HD who presents to the Emergency Department for evaluation of shortness of breath onset one month ago with progressing SOB for the last 7 days. Patient was admitted two months ago for pneumonia and treated with Augmentin at the moment of discharge. She stated fever of 103 °F fever a week ago. Patient was diagnosed with HCAP and started on Zosyn. Blood cultures were obtained and are negative. Patient was placed on 3L O2 supplementation and over the next few days, O2 supplementation has been weaned off. Patient has been taking off her O2 supplement intermittently to ambulate in the room. A pending discharge will be placed so that patient may go home if her O2 sat is >88% on Room Air. Patient will NOT be given any antibiotics script since she has just finished a 10-day course of Augmentin and has completed a 6 day course of Zosyn       Therefore, she is discharged in fair and stable condition to home with close outpatient follow-up.    The patient met 2-midnight criteria for an inpatient stay at the time of discharge.    ADDENDUM:  Patient was originally set for  discharge on 7/25/18 however, upon ambulating the patient she was still hypoxic and required further Solumedrol treatment as well as Home O2 set up. Patient was successfuly discharged on 7/28/18 in stable condition  Discharge Date  7/28/18    FOLLOW UP ITEMS POST DISCHARGE  HCAP    DISCHARGE DIAGNOSES  Principal Problem:    Pneumonia POA: Yes  Active Problems:    Pleural effusion on left POA: Yes    Chronic lupus nephritis (HCC) POA: Yes    Lupus (systemic lupus erythematosus) ([facility]) POA: Yes    Fibromyalgia POA: Yes    Anxiety POA: Yes    Opioid dependence ([facility]) POA: Yes    Drug-seeking behavior (Chronic) POA: Yes    ESRD (end stage renal disease) on dialysis ([facility]) POA: Yes    A-V fistula ([facility]) POA: Yes    Depression POA: Yes    Avascular necrosis of bones of both hips ([facility]) POA: Yes    Obesity (BMI 30-39.9) POA: Yes  Resolved Problems:    * No resolved hospital problems. *      FOLLOW UP  No future appointments.  Sushila Manzano M.D.  [address] [place] [id number]  [phone]    Schedule an appointment as soon as possible for a visit in 5 days        MEDICATIONS ON DISCHARGE     Medication List      CONTINUE taking these medications      Instructions   ascorbic acid 500 MG Tabs  Commonly known as:  ascorbic acid   Take 500 mg by mouth every day.  Dose:  500 mg     cholecalciferol 5000 UNIT Caps  Commonly known as:  VITAMIN D3   Take 10,000 Units by mouth every day.  Dose:  69841 Units     cinacalcet 30 MG Tabs  Commonly known as:  SENSIPAR   Take 30 mg by mouth every bedtime.  Dose:  30 mg     ferrous sulfate 325 (65 Fe) MG tablet   Take 325 mg by mouth every day.  Dose:  325 mg     hydroxychloroquine 200 MG Tabs  Commonly known as:  PLAQUENIL   Take 200 mg by mouth every bedtime.  Dose:  200 mg     lidocaine 5 % Ptch  Commonly known as:  LIDODERM   Apply 1 Patch to skin as directed every 24 hours. Apply to back  Dose:  1 Patch     Oxycodone HCl 20 MG Tabs   Take 1 Tab by mouth every 6 hours as  needed (Take up to three a day as needed and one additional pill after dialysis (3 times a week)) for up to 30 days.  Dose:  20 mg     pregabalin 100 MG Caps  Commonly known as:  LYRICA   Take 100 mg by mouth 3 times a day.  Dose:  100 mg     promethazine 25 MG Tabs  Commonly known as:  PHENERGAN   Take 25 mg by mouth every 6 hours as needed for Nausea/Vomiting.  Dose:  25 mg     RENVELA 800 MG Tabs tablet  Generic drug:  sevelamer carbonate   Take 2,400 mg by mouth 3 times a day, with meals.  Dose:  2400 mg     tizanidine 4 MG Tabs  Commonly known as:  ZANAFLEX   Take 2 Tabs by mouth every bedtime.  Dose:  8 mg     traZODone 50 MG Tabs  Commonly known as:  DESYREL   Take 1 Tab by mouth every bedtime.  Dose:  50 mg     WELLBUTRIN  MG XL tablet  Generic drug:  buPROPion   Take 150 mg by mouth every morning.  Dose:  150 mg            Allergies  Allergies   Allergen Reactions   • Lorazepam [Ativan] Anxiety     Patient becomes severely paranoid and agitated   • Morphine Itching     Tolerates Dilaudid   • Seasonal Runny Nose and Itching     Hay fever, sabiha brush       DIET  Orders Placed This Encounter   Procedures   • Diet Order Renal     Standing Status:   Standing     Number of Occurrences:   1     Order Specific Question:   Diet:     Answer:   Renal [8]       ACTIVITY  As tolerated.  Weight bearing as tolerated    CONSULTATIONS  None    PROCEDURES  None    LABORATORY  Lab Results   Component Value Date    SODIUM 140 07/23/2018    POTASSIUM 4.5 07/23/2018    CHLORIDE 98 07/23/2018    CO2 27 07/23/2018    GLUCOSE 83 07/23/2018    BUN 20 07/23/2018    CREATININE 7.13 (HH) 07/23/2018    CREATININE 0.9 12/13/2008        Lab Results   Component Value Date    WBC 4.7 (L) 07/23/2018    HEMOGLOBIN 9.3 (L) 07/23/2018    HEMATOCRIT 28.9 (L) 07/23/2018    PLATELETCT 132 (L) 07/23/2018        Total time of the discharge process exceeds 31 minutes.

## 2018-07-25 NOTE — PROGRESS NOTES
Patient is AOx4. Plan of care discussed. Complains of pain, repositioned, encouraged rest.  Tolerated all oral medications. On  2 L of O2 per NC, IS at bedside, able to perform correctly and effectively. Call light in use, treaded socks on, bed locked in low position

## 2018-07-25 NOTE — CARE PLAN
Problem: Infection  Goal: Will remain free from infection    Intervention: Implement standard precautions and perform hand washing before and after patient contact  Educated patient about TKO and infection prevention. Verbalized understanding       Problem: Pain Management  Goal: Pain level will decrease to patient's comfort goal  Outcome: PROGRESSING AS EXPECTED    Intervention: Follow pain managment plan developed in collaboration with patient and Interdisciplinary Team  Educated patient on her pain control options, Verbalized understanding.

## 2018-07-25 NOTE — PROGRESS NOTES
HEMODIALYSIS NOTES:     HD today x 3 hours per Dr. Shaver. Initiated at 0804 and ended at 1104. Patient tolerated treatment. Please see paper flowsheet for details.    UF Net: 1,000 mL    Blood returned. Applied gauze and held R AVF site for 8 minutes. Verified no bleeding. Bruit and thrill present post dialysis. Instructions given to Primary RN that if bleeding occurs on the AVF site, change dressing and held the site with pressure.     Report given to KALEY Mckeon RN.

## 2018-07-25 NOTE — PROGRESS NOTES
[facility] Medicine Daily Progress Note    Date of Service  7/25/2018    Chief Complaint  37 y.o. female admitted 7/19/2018 with  SOB. PmHx of SLE/Lupus nephritis, ESRD on HD who presents to the Emergency Department for evaluation of shortness of breath onset one month ago with progressing SOB for the last 7 days. Patient was admitted two months ago for pneumonia and treated with Augmentin at the moment of discharge. She stated fever of 103 °F fever a week ago. Patient was diagnosed with HCAP and started on Zosyn     Interval Problem Update  Patient has been taking her O2 supplement off to ambulate in her room. Still complains of generalized pain. Continues to be on Zosyn.    Consultants/Specialty  Palliative- signed off 7/24     Disposition  Home    Review of Systems  Review of Systems   Constitutional: Negative.    HENT: Negative.    Eyes: Negative.    Respiratory: Negative.    Cardiovascular: Negative.    Gastrointestinal: Negative.    Musculoskeletal: Positive for back pain, joint pain and neck pain.   Neurological: Negative.    All other systems reviewed and are negative.       Physical Exam  Blood Pressure: 110/70   Temperature: 36.7 °C (98 °F)   Pulse: 77   Respiration: 20   Pulse Oximetry: 97 %     Physical Exam   Constitutional: She is oriented to person, place, and time. No distress.   HENT:   Head: Normocephalic and atraumatic.   Eyes: Pupils are equal, round, and reactive to light.   Neck: Normal range of motion. Neck supple.   Cardiovascular: Normal rate, regular rhythm and normal heart sounds.    Pulmonary/Chest: Effort normal and breath sounds normal.   Abdominal: Soft. Bowel sounds are normal.   Musculoskeletal: Normal range of motion.   Neurological: She is alert and oriented to person, place, and time.   Skin: She is not diaphoretic.   Vitals reviewed.      Fluids    Intake/Output Summary (Last 24 hours) at 07/25/18 0831  Last data filed at 07/25/18 0500   Gross per 24 hour   Intake             1300  ml   Output                0 ml   Net             1300 ml       Laboratory  Recent Labs      07/23/18   2145   WBC  4.7*   RBC  3.09*   HEMOGLOBIN  9.3*   HEMATOCRIT  28.9*   MCV  93.5   MCH  30.1   MCHC  32.2*   RDW  46.7   PLATELETCT  132*   MPV  10.2     Recent Labs      07/23/18   2145   SODIUM  140   POTASSIUM  4.5   CHLORIDE  98   CO2  27   GLUCOSE  83   BUN  20   CREATININE  7.13*   CALCIUM  7.9*                   Assessment/Plan  * Pneumonia- (present on admission)   Assessment & Plan    - Patient presenting with fever, chill and productive cough not improving. Recently admitted for pneumonia. BC performed and were negative. Patient sent home with Augmentin for 10 days  - Now presenting with reported fever shortness of breath and chest pain  - Will cover for HCAP with Zosyn  - Vancomycin dc'd  - BC/SC ordered, follow-up, so far no growth to date  - Deescalation therapy upon results of culture  - Patient high risk for pseudomonas/MDR and MRSA.  Continue Zosyn.        Pleural effusion on left- (present on admission)   Assessment & Plan    - Suspecting to be secondary to possible pneumonic process  - Review chest x-ray, left-sided pleural effusion is small, no thoracentesis needed  - Continue antibiotics        Chronic lupus nephritis (HCC)- (present on admission)   Assessment & Plan    Continue with home meds        Avascular necrosis of bones of both hips (HCC)- (present on admission)   Assessment & Plan    -Continue with current dose of pain management  -Recommended not to perform adjustment.  Hx of opioid dependency        ESRD (end stage renal disease) on dialysis (HCC)- (present on admission)   Assessment & Plan    Patient with hx of lupus nephritis with ESRD on HD MWF. Patient missed last HD.  -Nephrology consulted to help with dialysis arrangements, appreciate help        Drug-seeking behavior- (present on admission)   Assessment & Plan    - Recommended no further adjustment(increase) on current  dose.  - Extensive review of patient's past medical history, see the patient has been admitted here numerous times over the last 5-6 years, understandably patient has complex history and feels awful but cannot keep escalating medications  - Patient typically bargains or will play of her symptoms to request extra doses of opioids  - Palliative consulted for pain management (7/24): Methadone 2.5 mg PO BID without escalation before 4-6 days. If methadone started, monitor EKG and assess for QTc prolongation; monitor electrolytes K and Mg. Adjust oxycodone for better pain relief without increasing total daily dose to 10 mg Q 3 hours PRN pain. Lower oxycodone as methadone takes therapeutic effect. Refer to outpatient pain management for follow-up. Opioids (oxycodone) should be titrated down as they will likely aggravate her fibromyalgia. Could increase pregabalin to 75 mg daily (this is the highest renal dose)         Anxiety- (present on admission)   Assessment & Plan    Continue bupropion        Fibromyalgia- (present on admission)   Assessment & Plan    Patient complains of worsening back and flank pain  Continue current opioid regimen

## 2018-07-25 NOTE — DISCHARGE PLANNING
Anticipated Discharge Disposition: home    Action: patient does not have medicaid, stating she did have it but did not renew it.  Further, stated she has applied a couple times since then but has not been able to follow up with the application.  Does not feels she needs it at this time.     Barriers to Discharge: medical clearance    Plan: no current needs identified at this time

## 2018-07-25 NOTE — PROGRESS NOTES
Pt A&Ox4. Pt having complaints of back pain and nausea, meds given for this. Tried to wean pt to room air when ambulating, oxygen saturation at 80%. Put 2 L nasal cannula back on pt. Call light within reach and hourly rounding in place.

## 2018-07-26 ENCOUNTER — APPOINTMENT (OUTPATIENT)
Dept: RADIOLOGY | Facility: MEDICAL CENTER | Age: 38
DRG: 193 | End: 2018-07-26
Attending: FAMILY MEDICINE
Payer: MEDICARE

## 2018-07-26 LAB
ANION GAP SERPL CALC-SCNC: 14 MMOL/L (ref 0–11.9)
BASOPHILS # BLD AUTO: 0.4 % (ref 0–1.8)
BASOPHILS # BLD: 0.02 K/UL (ref 0–0.12)
BUN SERPL-MCNC: 20 MG/DL (ref 8–22)
CALCIUM SERPL-MCNC: 7.4 MG/DL (ref 8.5–10.5)
CHLORIDE SERPL-SCNC: 99 MMOL/L (ref 96–112)
CO2 SERPL-SCNC: 24 MMOL/L (ref 20–33)
CREAT SERPL-MCNC: 6.47 MG/DL (ref 0.5–1.4)
EOSINOPHIL # BLD AUTO: 0.01 K/UL (ref 0–0.51)
EOSINOPHIL NFR BLD: 0.2 % (ref 0–6.9)
ERYTHROCYTE [DISTWIDTH] IN BLOOD BY AUTOMATED COUNT: 46.9 FL (ref 35.9–50)
GLUCOSE SERPL-MCNC: 117 MG/DL (ref 65–99)
HCT VFR BLD AUTO: 31.7 % (ref 37–47)
HGB BLD-MCNC: 10 G/DL (ref 12–16)
IMM GRANULOCYTES # BLD AUTO: 0.06 K/UL (ref 0–0.11)
IMM GRANULOCYTES NFR BLD AUTO: 1.2 % (ref 0–0.9)
LYMPHOCYTES # BLD AUTO: 0.67 K/UL (ref 1–4.8)
LYMPHOCYTES NFR BLD: 13.7 % (ref 22–41)
MCH RBC QN AUTO: 29.9 PG (ref 27–33)
MCHC RBC AUTO-ENTMCNC: 31.5 G/DL (ref 33.6–35)
MCV RBC AUTO: 94.9 FL (ref 81.4–97.8)
MONOCYTES # BLD AUTO: 0.17 K/UL (ref 0–0.85)
MONOCYTES NFR BLD AUTO: 3.5 % (ref 0–13.4)
NEUTROPHILS # BLD AUTO: 3.96 K/UL (ref 2–7.15)
NEUTROPHILS NFR BLD: 81 % (ref 44–72)
NRBC # BLD AUTO: 0 K/UL
NRBC BLD-RTO: 0 /100 WBC
PLATELET # BLD AUTO: 169 K/UL (ref 164–446)
PMV BLD AUTO: 10.7 FL (ref 9–12.9)
POTASSIUM SERPL-SCNC: 5.7 MMOL/L (ref 3.6–5.5)
RBC # BLD AUTO: 3.34 M/UL (ref 4.2–5.4)
SODIUM SERPL-SCNC: 137 MMOL/L (ref 135–145)
WBC # BLD AUTO: 4.9 K/UL (ref 4.8–10.8)

## 2018-07-26 PROCEDURE — A9270 NON-COVERED ITEM OR SERVICE: HCPCS | Performed by: FAMILY MEDICINE

## 2018-07-26 PROCEDURE — 700111 HCHG RX REV CODE 636 W/ 250 OVERRIDE (IP): Performed by: HOSPITALIST

## 2018-07-26 PROCEDURE — 700102 HCHG RX REV CODE 250 W/ 637 OVERRIDE(OP): Performed by: HOSPITALIST

## 2018-07-26 PROCEDURE — A9270 NON-COVERED ITEM OR SERVICE: HCPCS | Performed by: HOSPITALIST

## 2018-07-26 PROCEDURE — 80048 BASIC METABOLIC PNL TOTAL CA: CPT

## 2018-07-26 PROCEDURE — 99233 SBSQ HOSP IP/OBS HIGH 50: CPT | Performed by: FAMILY MEDICINE

## 2018-07-26 PROCEDURE — 700105 HCHG RX REV CODE 258: Performed by: HOSPITALIST

## 2018-07-26 PROCEDURE — 770006 HCHG ROOM/CARE - MED/SURG/GYN SEMI*

## 2018-07-26 PROCEDURE — 700101 HCHG RX REV CODE 250: Performed by: HOSPITALIST

## 2018-07-26 PROCEDURE — 700102 HCHG RX REV CODE 250 W/ 637 OVERRIDE(OP): Performed by: FAMILY MEDICINE

## 2018-07-26 PROCEDURE — 85025 COMPLETE CBC W/AUTO DIFF WBC: CPT

## 2018-07-26 PROCEDURE — 700111 HCHG RX REV CODE 636 W/ 250 OVERRIDE (IP): Performed by: FAMILY MEDICINE

## 2018-07-26 PROCEDURE — 71045 X-RAY EXAM CHEST 1 VIEW: CPT

## 2018-07-26 RX ADMIN — OXYCODONE HYDROCHLORIDE AND ACETAMINOPHEN 500 MG: 500 TABLET ORAL at 04:49

## 2018-07-26 RX ADMIN — METHYLPREDNISOLONE SODIUM SUCCINATE 40 MG: 40 INJECTION, POWDER, FOR SOLUTION INTRAMUSCULAR; INTRAVENOUS at 17:45

## 2018-07-26 RX ADMIN — PROMETHAZINE HYDROCHLORIDE 25 MG: 25 TABLET ORAL at 07:48

## 2018-07-26 RX ADMIN — METHADONE HYDROCHLORIDE 2.5 MG: 10 TABLET ORAL at 00:59

## 2018-07-26 RX ADMIN — HYDROXYCHLOROQUINE SULFATE 200 MG: 200 TABLET, FILM COATED ORAL at 20:17

## 2018-07-26 RX ADMIN — TIZANIDINE 8 MG: 4 TABLET ORAL at 21:43

## 2018-07-26 RX ADMIN — TRAZODONE HYDROCHLORIDE 50 MG: 50 TABLET ORAL at 21:43

## 2018-07-26 RX ADMIN — PIPERACILLIN AND TAZOBACTAM 4.5 G: 4; .5 INJECTION, POWDER, LYOPHILIZED, FOR SOLUTION INTRAVENOUS; PARENTERAL at 17:47

## 2018-07-26 RX ADMIN — PROMETHAZINE HYDROCHLORIDE 25 MG: 25 TABLET ORAL at 00:59

## 2018-07-26 RX ADMIN — HEPARIN SODIUM 5000 UNITS: 5000 INJECTION, SOLUTION INTRAVENOUS; SUBCUTANEOUS at 13:54

## 2018-07-26 RX ADMIN — OXYCODONE HYDROCHLORIDE 20 MG: 10 TABLET ORAL at 13:52

## 2018-07-26 RX ADMIN — CINACALCET HYDROCHLORIDE 30 MG: 30 TABLET, COATED ORAL at 20:17

## 2018-07-26 RX ADMIN — OXYCODONE HYDROCHLORIDE 20 MG: 10 TABLET ORAL at 01:00

## 2018-07-26 RX ADMIN — Medication 325 MG: at 04:48

## 2018-07-26 RX ADMIN — PIPERACILLIN AND TAZOBACTAM 4.5 G: 4; .5 INJECTION, POWDER, LYOPHILIZED, FOR SOLUTION INTRAVENOUS; PARENTERAL at 04:44

## 2018-07-26 RX ADMIN — OXYCODONE HYDROCHLORIDE 20 MG: 10 TABLET ORAL at 20:17

## 2018-07-26 RX ADMIN — STANDARDIZED SENNA CONCENTRATE AND DOCUSATE SODIUM 2 TABLET: 8.6; 5 TABLET, FILM COATED ORAL at 17:44

## 2018-07-26 RX ADMIN — STANDARDIZED SENNA CONCENTRATE AND DOCUSATE SODIUM 2 TABLET: 8.6; 5 TABLET, FILM COATED ORAL at 04:48

## 2018-07-26 RX ADMIN — PROMETHAZINE HYDROCHLORIDE 25 MG: 25 TABLET ORAL at 20:17

## 2018-07-26 RX ADMIN — HEPARIN SODIUM 5000 UNITS: 5000 INJECTION, SOLUTION INTRAVENOUS; SUBCUTANEOUS at 20:17

## 2018-07-26 RX ADMIN — METHADONE HYDROCHLORIDE 2.5 MG: 10 TABLET ORAL at 12:00

## 2018-07-26 RX ADMIN — SEVELAMER CARBONATE 2400 MG: 800 TABLET, FILM COATED ORAL at 09:18

## 2018-07-26 RX ADMIN — SEVELAMER CARBONATE 2400 MG: 800 TABLET, FILM COATED ORAL at 12:01

## 2018-07-26 RX ADMIN — OXYCODONE HYDROCHLORIDE 20 MG: 10 TABLET ORAL at 07:47

## 2018-07-26 RX ADMIN — PREGABALIN 75 MG: 75 CAPSULE ORAL at 04:48

## 2018-07-26 RX ADMIN — ONDANSETRON HYDROCHLORIDE 4 MG: 2 INJECTION, SOLUTION INTRAMUSCULAR; INTRAVENOUS at 04:48

## 2018-07-26 RX ADMIN — BUPROPION HYDROCHLORIDE 150 MG: 150 TABLET, EXTENDED RELEASE ORAL at 04:59

## 2018-07-26 RX ADMIN — LIDOCAINE 1 PATCH: 50 PATCH TOPICAL at 04:44

## 2018-07-26 RX ADMIN — HEPARIN SODIUM 5000 UNITS: 5000 INJECTION, SOLUTION INTRAVENOUS; SUBCUTANEOUS at 04:45

## 2018-07-26 RX ADMIN — PROMETHAZINE HYDROCHLORIDE 25 MG: 25 TABLET ORAL at 13:59

## 2018-07-26 RX ADMIN — METHYLPREDNISOLONE SODIUM SUCCINATE 40 MG: 40 INJECTION, POWDER, FOR SOLUTION INTRAMUSCULAR; INTRAVENOUS at 04:46

## 2018-07-26 RX ADMIN — SEVELAMER CARBONATE 2400 MG: 800 TABLET, FILM COATED ORAL at 17:42

## 2018-07-26 ASSESSMENT — ENCOUNTER SYMPTOMS
GASTROINTESTINAL NEGATIVE: 1
NECK PAIN: 1
CARDIOVASCULAR NEGATIVE: 1
EYES NEGATIVE: 1
NEUROLOGICAL NEGATIVE: 1
BACK PAIN: 1
CONSTITUTIONAL NEGATIVE: 1
COUGH: 0
SHORTNESS OF BREATH: 1

## 2018-07-26 ASSESSMENT — PAIN SCALES - GENERAL
PAINLEVEL_OUTOF10: 6
PAINLEVEL_OUTOF10: 8
PAINLEVEL_OUTOF10: 6
PAINLEVEL_OUTOF10: 4
PAINLEVEL_OUTOF10: 5
PAINLEVEL_OUTOF10: 6
PAINLEVEL_OUTOF10: 6

## 2018-07-26 NOTE — PROGRESS NOTES
Pt A&Ox4. VS within normal limits. Pt having pain in back and pain meds given. Pt also having nausea. On 2 Liter nasal cannula. Call light within reach and hourly rounding in place.

## 2018-07-26 NOTE — PROGRESS NOTES
[facility] Medicine Daily Progress Note    Date of Service  7/26/2018    Chief Complaint  37 y.o. female admitted 7/19/2018 with SOB. PmHx of SLE/Lupus nephritis, ESRD on HD who presents to the Emergency Department for evaluation of shortness of breath onset one month ago with progressing SOB for the last 7 days. Patient was admitted two months ago for pneumonia and treated with Augmentin at the moment of discharge. She stated fever of 103 °F fever a week ago. Patient was diagnosed with HCAP and started on Zosyn    Interval Problem Update  Patient back on O2 supplementation after O2 dropped to 86% while ambulating on Room Air yesterday. Solumedrol was also started. Patient states she is feeling better this AM.    Consultants/Specialty  None    Disposition  PENDING    Review of Systems  Review of Systems   Constitutional: Negative.    HENT: Negative.    Eyes: Negative.    Respiratory: Positive for shortness of breath. Negative for cough.    Cardiovascular: Negative.    Gastrointestinal: Negative.    Musculoskeletal: Positive for back pain and neck pain.   Neurological: Negative.    All other systems reviewed and are negative.       Physical Exam  Blood Pressure: 133/87   Temperature: 36.5 °C (97.7 °F)   Pulse: 60   Respiration: 16   Pulse Oximetry: 92 %     Physical Exam   Constitutional: She is oriented to person, place, and time. No distress.   HENT:   Head: Normocephalic and atraumatic.   Eyes: Pupils are equal, round, and reactive to light.   Neck: Normal range of motion. Neck supple.   Cardiovascular: Normal rate, regular rhythm and normal heart sounds.    Pulmonary/Chest: Effort normal and breath sounds normal.   Abdominal: Soft. Bowel sounds are normal.   Musculoskeletal: Normal range of motion.   Neurological: She is alert and oriented to person, place, and time.   Skin: She is not diaphoretic.   Vitals reviewed.      Fluids    Intake/Output Summary (Last 24 hours) at 07/26/18 0716  Last data filed at 07/25/18  2125   Gross per 24 hour   Intake              980 ml   Output             1500 ml   Net             -520 ml       Laboratory  Recent Labs      07/23/18 2145 07/26/18   0440   WBC  4.7*  4.9   RBC  3.09*  3.34*   HEMOGLOBIN  9.3*  10.0*   HEMATOCRIT  28.9*  31.7*   MCV  93.5  94.9   MCH  30.1  29.9   MCHC  32.2*  31.5*   RDW  46.7  46.9   PLATELETCT  132*  169   MPV  10.2  10.7     Recent Labs      07/23/18 2145 07/26/18   0440   SODIUM  140  137   POTASSIUM  4.5  5.7*   CHLORIDE  98  99   CO2  27  24   GLUCOSE  83  117*   BUN  20  20   CREATININE  7.13*  6.47*   CALCIUM  7.9*  7.4*                     Assessment/Plan  * Pneumonia- (present on admission)   Assessment & Plan    - Patient presenting with fever, chill and productive cough not improving. Recently admitted for pneumonia. BC performed and were negative. Patient sent home with Augmentin for 10 days  - Continue to cover for HCAP with Zosyn  - Vancomycin dc'd  - BC/SC Negative  - Continue Solumedrol  - Wean off O2 as tolerated          Pleural effusion on left- (present on admission)   Assessment & Plan    - Suspecting to be secondary to possible pneumonic process  - Review chest x-ray, left-sided pleural effusion is small, no thoracentesis needed  - Continue antibiotics        Chronic lupus nephritis (HCC)- (present on admission)   Assessment & Plan    Continue with home meds        Avascular necrosis of bones of both hips (HCC)- (present on admission)   Assessment & Plan    -Continue with current dose of pain management  -Recommended not to perform adjustment.  Hx of opioid dependency        ESRD (end stage renal disease) on dialysis (HCC)- (present on admission)   Assessment & Plan    Patient with hx of lupus nephritis with ESRD on HD MWF. Patient missed last HD.  -Nephrology consulted to help with dialysis arrangements, appreciate help        Drug-seeking behavior- (present on admission)   Assessment & Plan    - Recommended no further  adjustment(increase) on current dose.  - Extensive review of patient's past medical history, see the patient has been admitted here numerous times over the last 5-6 years, understandably patient has complex history and feels awful but cannot keep escalating medications  - Patient typically bargains or will play of her symptoms to request extra doses of opioids  - Palliative signed off with following recommendations (7/24): Methadone 2.5 mg PO BID without escalation before 4-6 days. If methadone started, monitor EKG and assess for QTc prolongation; monitor electrolytes K and Mg. Adjust oxycodone for better pain relief without increasing total daily dose to 10 mg Q 3 hours PRN pain. Lower oxycodone as methadone takes therapeutic effect. Refer to outpatient pain management for follow-up. Opioids (oxycodone) should be titrated down as they will likely aggravate her fibromyalgia. Could increase pregabalin to 75 mg daily (this is the highest renal dose)         Anxiety- (present on admission)   Assessment & Plan    Continue bupropion        Fibromyalgia- (present on admission)   Assessment & Plan    Patient complains of worsening back and flank pain  Continue current opioid regimen

## 2018-07-26 NOTE — CARE PLAN
Problem: Safety  Goal: Will remain free from falls  Outcome: PROGRESSING AS EXPECTED  Pt safety education completed.     Problem: Pain Management  Goal: Pain level will decrease to patient's comfort goal  Outcome: PROGRESSING AS EXPECTED  Using scheduled methadone and PRN oxycodone for pain.

## 2018-07-27 LAB
ANION GAP SERPL CALC-SCNC: 15 MMOL/L (ref 0–11.9)
BASOPHILS # BLD AUTO: 0.3 % (ref 0–1.8)
BASOPHILS # BLD: 0.03 K/UL (ref 0–0.12)
BUN SERPL-MCNC: 35 MG/DL (ref 8–22)
CALCIUM SERPL-MCNC: 7.2 MG/DL (ref 8.5–10.5)
CHLORIDE SERPL-SCNC: 100 MMOL/L (ref 96–112)
CO2 SERPL-SCNC: 23 MMOL/L (ref 20–33)
CREAT SERPL-MCNC: 7.94 MG/DL (ref 0.5–1.4)
EOSINOPHIL # BLD AUTO: 0 K/UL (ref 0–0.51)
EOSINOPHIL NFR BLD: 0 % (ref 0–6.9)
ERYTHROCYTE [DISTWIDTH] IN BLOOD BY AUTOMATED COUNT: 49.1 FL (ref 35.9–50)
GLUCOSE SERPL-MCNC: 131 MG/DL (ref 65–99)
HCT VFR BLD AUTO: 30.8 % (ref 37–47)
HGB BLD-MCNC: 9.6 G/DL (ref 12–16)
IMM GRANULOCYTES # BLD AUTO: 0.32 K/UL (ref 0–0.11)
IMM GRANULOCYTES NFR BLD AUTO: 3.3 % (ref 0–0.9)
LYMPHOCYTES # BLD AUTO: 1.1 K/UL (ref 1–4.8)
LYMPHOCYTES NFR BLD: 11.5 % (ref 22–41)
MCH RBC QN AUTO: 29.6 PG (ref 27–33)
MCHC RBC AUTO-ENTMCNC: 31.2 G/DL (ref 33.6–35)
MCV RBC AUTO: 95.1 FL (ref 81.4–97.8)
MONOCYTES # BLD AUTO: 0.42 K/UL (ref 0–0.85)
MONOCYTES NFR BLD AUTO: 4.4 % (ref 0–13.4)
NEUTROPHILS # BLD AUTO: 7.72 K/UL (ref 2–7.15)
NEUTROPHILS NFR BLD: 80.5 % (ref 44–72)
NRBC # BLD AUTO: 0.02 K/UL
NRBC BLD-RTO: 0.2 /100 WBC
PLATELET # BLD AUTO: 160 K/UL (ref 164–446)
PMV BLD AUTO: 10.9 FL (ref 9–12.9)
POTASSIUM SERPL-SCNC: 6 MMOL/L (ref 3.6–5.5)
RBC # BLD AUTO: 3.24 M/UL (ref 4.2–5.4)
SODIUM SERPL-SCNC: 138 MMOL/L (ref 135–145)
WBC # BLD AUTO: 9.6 K/UL (ref 4.8–10.8)

## 2018-07-27 PROCEDURE — 90935 HEMODIALYSIS ONE EVALUATION: CPT

## 2018-07-27 PROCEDURE — 5A1D70Z PERFORMANCE OF URINARY FILTRATION, INTERMITTENT, LESS THAN 6 HOURS PER DAY: ICD-10-PCS | Performed by: INTERNAL MEDICINE

## 2018-07-27 PROCEDURE — 770006 HCHG ROOM/CARE - MED/SURG/GYN SEMI*

## 2018-07-27 PROCEDURE — A9270 NON-COVERED ITEM OR SERVICE: HCPCS | Performed by: FAMILY MEDICINE

## 2018-07-27 PROCEDURE — 90935 HEMODIALYSIS ONE EVALUATION: CPT | Performed by: INTERNAL MEDICINE

## 2018-07-27 PROCEDURE — 700111 HCHG RX REV CODE 636 W/ 250 OVERRIDE (IP): Mod: JG | Performed by: INTERNAL MEDICINE

## 2018-07-27 PROCEDURE — 700102 HCHG RX REV CODE 250 W/ 637 OVERRIDE(OP): Performed by: FAMILY MEDICINE

## 2018-07-27 PROCEDURE — 700111 HCHG RX REV CODE 636 W/ 250 OVERRIDE (IP): Performed by: FAMILY MEDICINE

## 2018-07-27 PROCEDURE — 99233 SBSQ HOSP IP/OBS HIGH 50: CPT | Performed by: FAMILY MEDICINE

## 2018-07-27 PROCEDURE — 85025 COMPLETE CBC W/AUTO DIFF WBC: CPT

## 2018-07-27 PROCEDURE — 700111 HCHG RX REV CODE 636 W/ 250 OVERRIDE (IP): Performed by: HOSPITALIST

## 2018-07-27 PROCEDURE — 80048 BASIC METABOLIC PNL TOTAL CA: CPT

## 2018-07-27 PROCEDURE — 700102 HCHG RX REV CODE 250 W/ 637 OVERRIDE(OP): Performed by: HOSPITALIST

## 2018-07-27 PROCEDURE — 700101 HCHG RX REV CODE 250: Performed by: HOSPITALIST

## 2018-07-27 PROCEDURE — A9270 NON-COVERED ITEM OR SERVICE: HCPCS | Performed by: HOSPITALIST

## 2018-07-27 RX ORDER — HEPARIN SODIUM 1000 [USP'U]/ML
INJECTION, SOLUTION INTRAVENOUS; SUBCUTANEOUS
Status: DISPENSED
Start: 2018-07-27 | End: 2018-07-27

## 2018-07-27 RX ADMIN — TRAZODONE HYDROCHLORIDE 50 MG: 50 TABLET ORAL at 21:02

## 2018-07-27 RX ADMIN — LIDOCAINE 1 PATCH: 50 PATCH TOPICAL at 05:07

## 2018-07-27 RX ADMIN — BUPROPION HYDROCHLORIDE 150 MG: 150 TABLET, EXTENDED RELEASE ORAL at 05:17

## 2018-07-27 RX ADMIN — HEPARIN SODIUM 5000 UNITS: 5000 INJECTION, SOLUTION INTRAVENOUS; SUBCUTANEOUS at 21:02

## 2018-07-27 RX ADMIN — ONDANSETRON HYDROCHLORIDE 4 MG: 2 INJECTION, SOLUTION INTRAMUSCULAR; INTRAVENOUS at 12:19

## 2018-07-27 RX ADMIN — OXYCODONE HYDROCHLORIDE AND ACETAMINOPHEN 500 MG: 500 TABLET ORAL at 05:07

## 2018-07-27 RX ADMIN — HEPARIN SODIUM 5000 UNITS: 5000 INJECTION, SOLUTION INTRAVENOUS; SUBCUTANEOUS at 05:06

## 2018-07-27 RX ADMIN — METHYLPREDNISOLONE SODIUM SUCCINATE 40 MG: 40 INJECTION, POWDER, FOR SOLUTION INTRAMUSCULAR; INTRAVENOUS at 17:43

## 2018-07-27 RX ADMIN — STANDARDIZED SENNA CONCENTRATE AND DOCUSATE SODIUM 2 TABLET: 8.6; 5 TABLET, FILM COATED ORAL at 05:07

## 2018-07-27 RX ADMIN — CINACALCET HYDROCHLORIDE 30 MG: 30 TABLET, COATED ORAL at 21:01

## 2018-07-27 RX ADMIN — ONDANSETRON HYDROCHLORIDE 4 MG: 2 INJECTION, SOLUTION INTRAMUSCULAR; INTRAVENOUS at 05:16

## 2018-07-27 RX ADMIN — Medication 325 MG: at 05:07

## 2018-07-27 RX ADMIN — METHYLPREDNISOLONE SODIUM SUCCINATE 40 MG: 40 INJECTION, POWDER, FOR SOLUTION INTRAMUSCULAR; INTRAVENOUS at 05:07

## 2018-07-27 RX ADMIN — HYDROXYCHLOROQUINE SULFATE 200 MG: 200 TABLET, FILM COATED ORAL at 21:02

## 2018-07-27 RX ADMIN — METHADONE HYDROCHLORIDE 2.5 MG: 10 TABLET ORAL at 12:19

## 2018-07-27 RX ADMIN — OXYCODONE HYDROCHLORIDE 20 MG: 10 TABLET ORAL at 14:13

## 2018-07-27 RX ADMIN — PROMETHAZINE HYDROCHLORIDE 25 MG: 25 TABLET ORAL at 02:15

## 2018-07-27 RX ADMIN — OXYCODONE HYDROCHLORIDE 20 MG: 10 TABLET ORAL at 02:15

## 2018-07-27 RX ADMIN — SEVELAMER CARBONATE 2400 MG: 800 TABLET, FILM COATED ORAL at 07:50

## 2018-07-27 RX ADMIN — PROMETHAZINE HYDROCHLORIDE 25 MG: 25 TABLET ORAL at 21:02

## 2018-07-27 RX ADMIN — OXYCODONE HYDROCHLORIDE 20 MG: 10 TABLET ORAL at 08:09

## 2018-07-27 RX ADMIN — METHADONE HYDROCHLORIDE 2.5 MG: 10 TABLET ORAL at 00:07

## 2018-07-27 RX ADMIN — STANDARDIZED SENNA CONCENTRATE AND DOCUSATE SODIUM 2 TABLET: 8.6; 5 TABLET, FILM COATED ORAL at 17:42

## 2018-07-27 RX ADMIN — TIZANIDINE 8 MG: 4 TABLET ORAL at 21:02

## 2018-07-27 RX ADMIN — PROMETHAZINE HYDROCHLORIDE 25 MG: 25 TABLET ORAL at 08:09

## 2018-07-27 RX ADMIN — SEVELAMER CARBONATE 2400 MG: 800 TABLET, FILM COATED ORAL at 12:21

## 2018-07-27 RX ADMIN — HEPARIN SODIUM 2000 UNITS: 1000 INJECTION, SOLUTION INTRAVENOUS; SUBCUTANEOUS at 08:43

## 2018-07-27 RX ADMIN — HEPARIN SODIUM 5000 UNITS: 5000 INJECTION, SOLUTION INTRAVENOUS; SUBCUTANEOUS at 14:13

## 2018-07-27 RX ADMIN — OXYCODONE HYDROCHLORIDE 20 MG: 10 TABLET ORAL at 21:01

## 2018-07-27 RX ADMIN — PROMETHAZINE HYDROCHLORIDE 25 MG: 25 TABLET ORAL at 14:13

## 2018-07-27 RX ADMIN — SEVELAMER CARBONATE 2400 MG: 800 TABLET, FILM COATED ORAL at 17:42

## 2018-07-27 RX ADMIN — PREGABALIN 75 MG: 75 CAPSULE ORAL at 05:06

## 2018-07-27 RX ADMIN — ERYTHROPOIETIN 10000 UNITS: 10000 INJECTION, SOLUTION INTRAVENOUS; SUBCUTANEOUS at 05:17

## 2018-07-27 ASSESSMENT — ENCOUNTER SYMPTOMS
CARDIOVASCULAR NEGATIVE: 1
PALPITATIONS: 0
EYES NEGATIVE: 1
COUGH: 0
BACK PAIN: 1
MYALGIAS: 0
NAUSEA: 0
NEUROLOGICAL NEGATIVE: 1
FEVER: 0
SHORTNESS OF BREATH: 1
MUSCULOSKELETAL NEGATIVE: 1
GASTROINTESTINAL NEGATIVE: 1
HEMOPTYSIS: 0
ABDOMINAL PAIN: 0
ORTHOPNEA: 0
VOMITING: 0
DIZZINESS: 0
CHILLS: 0
SHORTNESS OF BREATH: 0
FOCAL WEAKNESS: 0
WHEEZING: 0
SENSORY CHANGE: 0
CONSTITUTIONAL NEGATIVE: 1
HEADACHES: 0

## 2018-07-27 ASSESSMENT — PAIN SCALES - GENERAL
PAINLEVEL_OUTOF10: 6
PAINLEVEL_OUTOF10: 5
PAINLEVEL_OUTOF10: 6
PAINLEVEL_OUTOF10: 8
PAINLEVEL_OUTOF10: 0
PAINLEVEL_OUTOF10: 7
PAINLEVEL_OUTOF10: 6
PAINLEVEL_OUTOF10: 6

## 2018-07-27 NOTE — PROGRESS NOTES
Nephrology Progress Note, Adult, Complex               Author: Leana Sivakumar Date & Time created: 7/27/2018  3:46 PM     Interval History:  37 year old with ESRD, came in with pleuritic chest pain. Felt to have pneumonia.  Doing better -improved appetite  No acute events  No new complaints  Seen and examined during dialysis -tolerates well    Review of Systems:  Review of Systems   Constitutional: Positive for malaise/fatigue. Negative for chills and fever.   HENT: Negative.    Respiratory: Negative for cough, hemoptysis, shortness of breath and wheezing.    Cardiovascular: Negative for chest pain, palpitations, orthopnea and leg swelling.   Gastrointestinal: Negative for abdominal pain, nausea and vomiting.   Musculoskeletal: Positive for back pain and joint pain. Negative for myalgias.   Skin: Negative.    Neurological: Negative for dizziness, sensory change, focal weakness and headaches.   All other systems reviewed and are negative.      Physical Exam:  Physical Exam   Constitutional: She is oriented to person, place, and time.   Cardiovascular: Normal rate.    Pulmonary/Chest: Effort normal. No respiratory distress. She has no wheezes. She has no rales.   Abdominal: Soft. Bowel sounds are normal. She exhibits no distension. There is no tenderness.   Musculoskeletal: She exhibits no edema or tenderness.   Neurological: She is alert and oriented to person, place, and time. No cranial nerve deficit.   Skin: Skin is warm and dry.       Labs:        Invalid input(s): [id number]      Recent Labs      07/26/18 0440  07/27/18 0220   SODIUM  137  138   POTASSIUM  5.7*  6.0*   CHLORIDE  99  100   CO2  24  23   BUN  20  35*   CREATININE  6.47*  7.94*   CALCIUM  7.4*  7.2*     Recent Labs      07/26/18 0440  07/27/18 0220   GLUCOSE  117*  131*     Recent Labs      07/26/18 0440 07/27/18 0220   RBC  3.34*  3.24*   HEMOGLOBIN  10.0*  9.6*   HEMATOCRIT  31.7*  30.8*   PLATELETCT  169  160*     Recent Labs       18   0440  18   0220   WBC  4.9  9.6   NEUTSPOLYS  81.00*  80.50*   LYMPHOCYTES  13.70*  11.50*   MONOCYTES  3.50  4.40   EOSINOPHILS  0.20  0.00   BASOPHILS  0.40  0.30           Hemodynamics:  Temp (24hrs), Av.3 °C (97.3 °F), Min:36.1 °C (97 °F), Max:36.5 °C (97.7 °F)  Temperature: 36.5 °C (97.7 °F)  Pulse  Av.3  Min: 58  Max: 115   Blood Pressure: (!) 166/86 (RN notified)     Respiratory:    Respiration: 17, Pulse Oximetry: 97 %        RUL Breath Sounds: Clear, RML Breath Sounds: Clear, RLL Breath Sounds: Diminished, LASHANDA Breath Sounds: Clear, LLL Breath Sounds: Diminished  Fluids:    Intake/Output Summary (Last 24 hours) at 18 1546  Last data filed at 18 1300   Gross per 24 hour   Intake              480 ml   Output                0 ml   Net              480 ml        GI/Nutrition:  Orders Placed This Encounter   Procedures   • Diet Order Renal     Standing Status:   Standing     Number of Occurrences:   1     Order Specific Question:   Diet:     Answer:   Renal [8]     Medical Decision Making, by Problem:  Active Hospital Problems    Diagnosis   • *Pneumonia [J18.9]   • Pleural effusion on left [J90]   • Lupus (systemic lupus erythematosus) ([facility]) [M32.9]   • Chronic lupus nephritis (HCC) [M32.14]   • 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 ESRD - HD on a MWF schedule -please see dialysis flow sheet for details  2. Pneumonia  -abx to renal doses  3. Anemia CKD - on epogen  4.HTN: BP well controlled  5.Electrolytes: hyperkalemia -correcting with dialysis  Recs;  Low K diet  HD MWF    Quality-Core Measures   Reviewed items::  Labs reviewed and Medications reviewed

## 2018-07-27 NOTE — CARE PLAN
Problem: Safety  Goal: Will remain free from falls  Outcome: PROGRESSING AS EXPECTED      Problem: Pain Management  Goal: Pain level will decrease to patient's comfort goal  Outcome: PROGRESSING AS EXPECTED  Managing patients pain with scheduled methadone and PRN oxycodone.

## 2018-07-27 NOTE — FACE TO FACE
Face to Face Supporting Documentation - Home Health    The encounter with this patient was in whole or in part the primary reason for home health admission.    Date of encounter:   Patient:                    MRN:                       YOB: 2016  Debby Carrizales  [id number]  1980     Home health to see patient for:  Skilled Nursing care for assessment, interventions & education    Skilled need for:  New Onset Medical Diagnosis HCAP    Skilled nursing interventions to include:  Comment: Education    Homebound status evidenced by:  Needs the assistance of another person in order to leave the home. Leaving home requires a considerable and taxing effort. There is a normal inability to leave the home.    Community Physician to provide follow up care: Sushila Manzano M.D.     Optional Interventions? No      I certify the face to face encounter for this home health care referral meets the CMS requirements and the encounter/clinical assessment with the patient was, in whole, or in part, for the medical condition(s) listed above, which is the primary reason for home health care. Based on my clinical findings: the service(s) are medically necessary, support the need for home health care, and the homebound criteria are met.  I certify that this patient has had a face to face encounter by myself.  Ant Ervin M.D. - NPI: [id number]

## 2018-07-27 NOTE — PROGRESS NOTES
Pt is A&Ox4. VS within normal limits. Pt complains of back pain and pain medication was given for for this. On 2 L nasal cannula, lungs sound clear. Call light within reach and hourly rounding in place.

## 2018-07-27 NOTE — PROGRESS NOTES
[facility] Medicine Daily Progress Note    Date of Service  7/27/2018    Chief Complaint  37 y.o. female admitted 7/19/2018 with SOB. PmHx of SLE/Lupus nephritis, ESRD on HD who presents to the Emergency Department for evaluation of shortness of breath onset one month ago with progressing SOB for the last 7 days. Patient was admitted two months ago for pneumonia and treated with Augmentin at the moment of discharge. She stated fever of 103 °F fever a week ago. Patient was diagnosed with HCAP and started on Zosyn    Interval Problem Update  Patient still requiring 2L O2. Again ambulated yesterday on Room Air and O2 sat dropped to <88%. Patient agreeable to having Home O2 set up upon discharge. K+ level found to be at 6 this AM. Scheduled for dialysis today.    Consultants/Specialty  None    Disposition  Home    Review of Systems  Review of Systems   Constitutional: Negative.    HENT: Negative.    Eyes: Negative.    Respiratory: Positive for shortness of breath.    Cardiovascular: Negative.    Gastrointestinal: Negative.    Musculoskeletal: Negative.    Neurological: Negative.    All other systems reviewed and are negative.       Physical Exam  Blood Pressure: (!) 174/91   Temperature: 36.2 °C (97.1 °F)   Pulse: (!) 58   Respiration: 18   Pulse Oximetry: 91 %     Physical Exam   Constitutional: She is oriented to person, place, and time. No distress.   HENT:   Head: Normocephalic and atraumatic.   Eyes: Pupils are equal, round, and reactive to light.   Neck: Normal range of motion. Neck supple.   Cardiovascular: Normal rate, regular rhythm and normal heart sounds.    Pulmonary/Chest: Effort normal and breath sounds normal.   Abdominal: Soft. Bowel sounds are normal.   Musculoskeletal: Normal range of motion.   Neurological: She is alert and oriented to person, place, and time.   Skin: She is not diaphoretic.   Vitals reviewed.      Fluids    Intake/Output Summary (Last 24 hours) at 07/27/18 0712  Last data filed at  07/26/18 0900   Gross per 24 hour   Intake              480 ml   Output                0 ml   Net              480 ml       Laboratory  Recent Labs      07/26/18   0440  07/27/18   0220   WBC  4.9  9.6   RBC  3.34*  3.24*   HEMOGLOBIN  10.0*  9.6*   HEMATOCRIT  31.7*  30.8*   MCV  94.9  95.1   MCH  29.9  29.6   MCHC  31.5*  31.2*   RDW  46.9  49.1   PLATELETCT  169  160*   MPV  10.7  10.9     Recent Labs      07/26/18   0440  07/27/18   0220   SODIUM  137  138   POTASSIUM  5.7*  6.0*   CHLORIDE  99  100   CO2  24  23   GLUCOSE  117*  131*   BUN  20  35*   CREATININE  6.47*  7.94*   CALCIUM  7.4*  7.2*                     Assessment/Plan  * Pneumonia- (present on admission)   Assessment & Plan    - Patient presenting with fever, chill and productive cough not improving. Recently admitted for pneumonia. BC performed and were negative. Patient sent home with Augmentin for 10 days  - Continue to cover for HCAP with Zosyn  - Vancomycin dc'd  - BC/SC Negative  - Continue Solumedrol  - Wean off O2 as tolerated          Pleural effusion on left- (present on admission)   Assessment & Plan    - Suspecting to be secondary to possible pneumonic process  - Review chest x-ray, left-sided pleural effusion is small, no thoracentesis needed  - Continue antibiotics        Drug-seeking behavior- (present on admission)   Assessment & Plan    - Recommended no further adjustment(increase) on current dose.  - Extensive review of patient's past medical history, see the patient has been admitted here numerous times over the last 5-6 years, understandably patient has complex history and feels awful but cannot keep escalating medications  - Patient typically bargains or will play of her symptoms to request extra doses of opioids  - Palliative signed off with following recommendations (7/24): Methadone 2.5 mg PO BID without escalation before 4-6 days. If methadone started, monitor EKG and assess for QTc prolongation; monitor electrolytes K and  Mg. Adjust oxycodone for better pain relief without increasing total daily dose to 10 mg Q 3 hours PRN pain. Lower oxycodone as methadone takes therapeutic effect. Refer to outpatient pain management for follow-up. Opioids (oxycodone) should be titrated down as they will likely aggravate her fibromyalgia. Could increase pregabalin to 75 mg daily (this is the highest renal dose)         ESRD (end stage renal disease) on dialysis (HCC)- (present on admission)   Assessment & Plan    Patient with hx of lupus nephritis with ESRD on HD MWF. Patient missed last HD.  -Nephrology consulted to help with dialysis arrangements, appreciate help        Chronic lupus nephritis (HCC)- (present on admission)   Assessment & Plan    Continue with home meds        Avascular necrosis of bones of both hips (HCC)- (present on admission)   Assessment & Plan    -Continue with current dose of pain management  -Recommended not to perform adjustment.  Hx of opioid dependency        Anxiety- (present on admission)   Assessment & Plan    Continue bupropion        Fibromyalgia- (present on admission)   Assessment & Plan    - Patient complains of worsening back and flank pain  - Continue current opioid regimen per Palliative recommendations

## 2018-07-28 ENCOUNTER — PATIENT OUTREACH (OUTPATIENT)
Dept: HEALTH INFORMATION MANAGEMENT | Facility: OTHER | Age: 38
End: 2018-07-28

## 2018-07-28 VITALS
OXYGEN SATURATION: 94 % | RESPIRATION RATE: 17 BRPM | WEIGHT: 189.38 LBS | HEART RATE: 71 BPM | SYSTOLIC BLOOD PRESSURE: 148 MMHG | DIASTOLIC BLOOD PRESSURE: 96 MMHG | BODY MASS INDEX: 31.55 KG/M2 | TEMPERATURE: 96.9 F | HEIGHT: 65 IN

## 2018-07-28 PROCEDURE — 700111 HCHG RX REV CODE 636 W/ 250 OVERRIDE (IP): Performed by: FAMILY MEDICINE

## 2018-07-28 PROCEDURE — 700101 HCHG RX REV CODE 250: Performed by: FAMILY MEDICINE

## 2018-07-28 PROCEDURE — 700111 HCHG RX REV CODE 636 W/ 250 OVERRIDE (IP): Performed by: HOSPITALIST

## 2018-07-28 PROCEDURE — 700101 HCHG RX REV CODE 250: Performed by: HOSPITALIST

## 2018-07-28 PROCEDURE — 99232 SBSQ HOSP IP/OBS MODERATE 35: CPT | Performed by: INTERNAL MEDICINE

## 2018-07-28 PROCEDURE — A9270 NON-COVERED ITEM OR SERVICE: HCPCS | Performed by: HOSPITALIST

## 2018-07-28 PROCEDURE — 700102 HCHG RX REV CODE 250 W/ 637 OVERRIDE(OP): Performed by: FAMILY MEDICINE

## 2018-07-28 PROCEDURE — 700102 HCHG RX REV CODE 250 W/ 637 OVERRIDE(OP): Performed by: HOSPITALIST

## 2018-07-28 PROCEDURE — A9270 NON-COVERED ITEM OR SERVICE: HCPCS | Performed by: FAMILY MEDICINE

## 2018-07-28 RX ORDER — LABETALOL HYDROCHLORIDE 5 MG/ML
20 INJECTION, SOLUTION INTRAVENOUS ONCE
Status: COMPLETED | OUTPATIENT
Start: 2018-07-28 | End: 2018-07-28

## 2018-07-28 RX ORDER — OXYCODONE HYDROCHLORIDE 20 MG/1
20 TABLET ORAL EVERY 6 HOURS PRN
Qty: 20 TAB | Refills: 0 | Status: SHIPPED | OUTPATIENT
Start: 2018-07-28 | End: 2018-07-31 | Stop reason: SDUPTHER

## 2018-07-28 RX ADMIN — METHADONE HYDROCHLORIDE 2.5 MG: 10 TABLET ORAL at 00:07

## 2018-07-28 RX ADMIN — PROMETHAZINE HYDROCHLORIDE 25 MG: 25 TABLET ORAL at 09:25

## 2018-07-28 RX ADMIN — HEPARIN SODIUM 5000 UNITS: 5000 INJECTION, SOLUTION INTRAVENOUS; SUBCUTANEOUS at 05:14

## 2018-07-28 RX ADMIN — SEVELAMER CARBONATE 2400 MG: 800 TABLET, FILM COATED ORAL at 10:56

## 2018-07-28 RX ADMIN — OXYCODONE HYDROCHLORIDE 20 MG: 10 TABLET ORAL at 09:22

## 2018-07-28 RX ADMIN — BUPROPION HYDROCHLORIDE 150 MG: 150 TABLET, EXTENDED RELEASE ORAL at 05:14

## 2018-07-28 RX ADMIN — METHADONE HYDROCHLORIDE 2.5 MG: 10 TABLET ORAL at 14:01

## 2018-07-28 RX ADMIN — LABETALOL HYDROCHLORIDE 20 MG: 5 INJECTION, SOLUTION INTRAVENOUS at 03:55

## 2018-07-28 RX ADMIN — METHYLPREDNISOLONE SODIUM SUCCINATE 40 MG: 40 INJECTION, POWDER, FOR SOLUTION INTRAMUSCULAR; INTRAVENOUS at 05:14

## 2018-07-28 RX ADMIN — PREGABALIN 75 MG: 75 CAPSULE ORAL at 05:14

## 2018-07-28 RX ADMIN — PROMETHAZINE HYDROCHLORIDE 25 MG: 25 TABLET ORAL at 03:03

## 2018-07-28 RX ADMIN — Medication 325 MG: at 05:15

## 2018-07-28 RX ADMIN — OXYCODONE HYDROCHLORIDE 20 MG: 10 TABLET ORAL at 03:03

## 2018-07-28 RX ADMIN — LIDOCAINE 1 PATCH: 50 PATCH TOPICAL at 05:15

## 2018-07-28 RX ADMIN — SEVELAMER CARBONATE 2400 MG: 800 TABLET, FILM COATED ORAL at 14:00

## 2018-07-28 RX ADMIN — HEPARIN SODIUM 5000 UNITS: 5000 INJECTION, SOLUTION INTRAVENOUS; SUBCUTANEOUS at 14:00

## 2018-07-28 RX ADMIN — OXYCODONE HYDROCHLORIDE AND ACETAMINOPHEN 500 MG: 500 TABLET ORAL at 05:15

## 2018-07-28 RX ADMIN — STANDARDIZED SENNA CONCENTRATE AND DOCUSATE SODIUM 2 TABLET: 8.6; 5 TABLET, FILM COATED ORAL at 05:14

## 2018-07-28 ASSESSMENT — ENCOUNTER SYMPTOMS
COUGH: 0
VOMITING: 0
MYALGIAS: 0
CHILLS: 0
WHEEZING: 0
ORTHOPNEA: 0
NAUSEA: 0
ABDOMINAL PAIN: 0
FEVER: 0
BACK PAIN: 1
SHORTNESS OF BREATH: 0
HEMOPTYSIS: 0
PALPITATIONS: 0

## 2018-07-28 ASSESSMENT — PAIN SCALES - GENERAL: PAINLEVEL_OUTOF10: 5

## 2018-07-28 NOTE — PROGRESS NOTES
Pt's /95 and HR 80. Pt is asymptomatic. Notified Dr. Fallon and received orders for IV labetalol. Given as ordered.

## 2018-07-28 NOTE — PROGRESS NOTES
Nephrology Progress Note, Adult, Complex               Author: Leana Carbajalnano Date & Time created: 7/28/2018  2:04 PM     Interval History:  37 year old with ESRD, came in with pleuritic chest pain. Felt to have pneumonia.  Doing better  No acute events  Plan to d/c home    Review of Systems:  Review of Systems   Constitutional: Positive for malaise/fatigue. Negative for chills and fever.   HENT: Negative.    Respiratory: Negative for cough, hemoptysis, shortness of breath and wheezing.    Cardiovascular: Negative for chest pain, palpitations, orthopnea and leg swelling.   Gastrointestinal: Negative for abdominal pain, nausea and vomiting.   Musculoskeletal: Positive for back pain and joint pain. Negative for myalgias.   Skin: Negative.    All other systems reviewed and are negative.      Physical Exam:  Physical Exam   Constitutional: She is oriented to person, place, and time. She does not appear ill.   Cardiovascular: Normal rate.    Pulmonary/Chest: Effort normal. No respiratory distress. She has no wheezes. She has no rales.   Abdominal: Soft. Bowel sounds are normal. She exhibits no distension. There is no tenderness.   Musculoskeletal: She exhibits no edema or tenderness.   Neurological: She is alert and oriented to person, place, and time. No cranial nerve deficit.   Skin: Skin is warm and dry.       Labs:        Invalid input(s): [id number]      Recent Labs      07/26/18 0440 07/27/18 0220   SODIUM  137  138   POTASSIUM  5.7*  6.0*   CHLORIDE  99  100   CO2  24  23   BUN  20  35*   CREATININE  6.47*  7.94*   CALCIUM  7.4*  7.2*     Recent Labs      07/26/18 0440 07/27/18 0220   GLUCOSE  117*  131*     Recent Labs      07/26/18 0440 07/27/18 0220   RBC  3.34*  3.24*   HEMOGLOBIN  10.0*  9.6*   HEMATOCRIT  31.7*  30.8*   PLATELETCT  169  160*     Recent Labs      07/26/18 0440 07/27/18 0220   WBC  4.9  9.6   NEUTSPOLYS  81.00*  80.50*   LYMPHOCYTES  13.70*  11.50*   MONOCYTES  3.50  4.40    EOSINOPHILS  0.20  0.00   BASOPHILS  0.40  0.30           Hemodynamics:  Temp (24hrs), Av.3 °C (97.3 °F), Min:36.1 °C (96.9 °F), Max:36.5 °C (97.7 °F)  Temperature: 36.1 °C (96.9 °F)  Pulse  Av.8  Min: 58  Max: 115   Blood Pressure: 148/96     Respiratory:    Respiration: 17, Pulse Oximetry: 94 %        RUL Breath Sounds: Clear, RML Breath Sounds: Clear, RLL Breath Sounds: Diminished, LASHANDA Breath Sounds: Clear, LLL Breath Sounds: Diminished  Fluids:    Intake/Output Summary (Last 24 hours) at 18 1404  Last data filed at 18 0900   Gross per 24 hour   Intake              560 ml   Output                0 ml   Net              560 ml        GI/Nutrition:  Orders Placed This Encounter   Procedures   • Diet Order Renal     Standing Status:   Standing     Number of Occurrences:   1     Order Specific Question:   Diet:     Answer:   Renal [8]     Medical Decision Making, by Problem:  Active Hospital Problems    Diagnosis   • *Pneumonia [J18.9]   • Pleural effusion on left [J90]   • Lupus (systemic lupus erythematosus) ([facility]) [M32.9]   • Chronic lupus nephritis ([facility]) [M32.14]   • 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 ESRD - HD on a MWF schedule   2. Pneumonia - recovered  3. Anemia CKD - on Epogen  4. HTN: BP well controlled  HD MWF  Renal diet  Quality-Core Measures   Reviewed items::  Labs reviewed and Medications reviewed

## 2018-07-28 NOTE — PROGRESS NOTES
Discharge instructions provided and reviewed, pain moderately controlled with po medication. Instructed on how to schedule follow up appointments. Discussed dialysis planning. Medication instruction provided.

## 2018-07-28 NOTE — PROGRESS NOTES
3 hr HD and 1 liter UF was completed at 1130 hrs. Patient tolerated her dialysis. Right AVG has good bruit. Post HD report given to her Primary RN.

## 2018-07-28 NOTE — DISCHARGE INSTRUCTIONS
Discharge Instructions    Discharged to home by car with relative. Discharged via wheelchair, hospital escort: Yes.  Special equipment needed: Not Applicable    Be sure to schedule a follow-up appointment with your primary care doctor or any specialists as instructed.     Discharge Plan:   Diet Plan: Discussed  Activity Level: Discussed  Confirmed Follow up Appointment: Appointment Scheduled  Confirmed Symptoms Management: Discussed  Medication Reconciliation Updated: Yes  Influenza Vaccine Indication: Patient Refuses    I understand that a diet low in cholesterol, fat, and sodium is recommended for good health. Unless I have been given specific instructions below for another diet, I accept this instruction as my diet prescription.   Other diet: Renal diet    Special Instructions: None    · Is patient discharged on Warfarin / Coumadin?   No     Depression / Suicide Risk    As you are discharged from this [facility] facility, it is important to learn how to keep safe from harming yourself.    Recognize the warning signs:  · Abrupt changes in personality, positive or negative- including increase in energy   · Giving away possessions  · Change in eating patterns- significant weight changes-  positive or negative  · Change in sleeping patterns- unable to sleep or sleeping all the time   · Unwillingness or inability to communicate  · Depression  · Unusual sadness, discouragement and loneliness  · Talk of wanting to die  · Neglect of personal appearance   · Rebelliousness- reckless behavior  · Withdrawal from people/activities they love  · Confusion- inability to concentrate     If you or a loved one observes any of these behaviors or has concerns about self-harm, here's what you can do:  · Talk about it- your feelings and reasons for harming yourself  · Remove any means that you might use to hurt yourself (examples: pills, rope, extension cords, firearm)  · Get professional help from the community (Mental Health,  Substance Abuse, psychological counseling)  · Do not be alone:Call your Safe Contact- someone whom you trust who will be there for you.  · Call your local CRISIS HOTLINE 984-2351 or 949-253-7647  · Call your local Children's Mobile Crisis Response Team [place] (003) 380-5100 or www.Kypha  · Call the toll free National Suicide Prevention Hotlines   · National Suicide Prevention Lifeline 819-407-MEQO (3910)  · [facility] [facility] 800-SUICIDE (370-8900)    Oxycodone tablets or capsules  What is this medicine?  OXYCODONE (ox i KOE done) is a pain reliever. It is used to treat moderate to severe pain.  This medicine may be used for other purposes; ask your health care provider or pharmacist if you have questions.  COMMON BRAND NAME(S): Dazidox, Endocodone, Oxaydo, OXECTA, OxyIR, Percolone, Roxicodone, ROXYBOND  What should I tell my health care provider before I take this medicine?  They need to know if you have any of these conditions:  -Jovan's disease  -brain tumor  -head injury  -heart disease  -history of drug or alcohol abuse problem  -if you often drink alcohol  -kidney disease  -liver disease  -lung or breathing disease, like asthma  -mental illness  -pancreatic disease  -seizures  -thyroid disease  -an unusual or allergic reaction to oxycodone, codeine, hydrocodone, morphine, other medicines, foods, dyes, or preservatives  -pregnant or trying to get pregnant  -breast-feeding  How should I use this medicine?  Take this medicine by mouth with a glass of water. Follow the directions on the prescription label. You can take it with or without food. If it upsets your stomach, take it with food. Take your medicine at regular intervals. Do not take it more often than directed. Do not stop taking except on your doctor's advice.  Some brands of this medicine, like Oxecta, have special instructions. Ask your doctor or pharmacist if these directions are for you: Do not cut, crush or chew this  medicine. Swallow only one tablet at a time. Do not wet, soak, or lick the tablet before you take it.  A special MedGuide will be given to you by the pharmacist with each prescription and refill. Be sure to read this information carefully each time.  Talk to your pediatrician regarding the use of this medicine in children. Special care may be needed.  Overdosage: If you think you have taken too much of this medicine contact a poison control center or emergency room at once.  NOTE: This medicine is only for you. Do not share this medicine with others.  What if I miss a dose?  If you miss a dose, take it as soon as you can. If it is almost time for your next dose, take only that dose. Do not take double or extra doses.  What may interact with this medicine?  This medicine may interact with the following medications:  -alcohol  -antihistamines for allergy, cough and cold  -antiviral medicines for HIV or AIDS  -atropine  -certain antibiotics like clarithromycin, erythromycin, linezolid, rifampin  -certain medicines for anxiety or sleep  -certain medicines for bladder problems like oxybutynin, tolterodine  -certain medicines for depression like amitriptyline, fluoxetine, sertraline  -certain medicines for fungal infections like ketoconazole, itraconazole, voriconazole  -certain medicines for migraine headache like almotriptan, eletriptan, frovatriptan, naratriptan, rizatriptan, sumatriptan, zolmitriptan  -certain medicines for nausea or vomiting like dolasetron, ondansetron, palonosetron  -certain medicines for Parkinson's disease like benztropine, trihexyphenidyl  -certain medicines for seizures like phenobarbital, phenytoin, primidone  -certain medicines for stomach problems like dicyclomine, hyoscyamine  -certain medicines for travel sickness like scopolamine  -diuretics  -general anesthetics like halothane, isoflurane, methoxyflurane, propofol  -ipratropium  -local anesthetics like lidocaine, pramoxine,  tetracaine  -MAOIs like Carbex, Eldepryl, Marplan, Nardil, and Parnate  -medicines that relax muscles for surgery  -methylene blue  -nilotinib  -other narcotic medicines for pain or cough  -phenothiazines like chlorpromazine, mesoridazine, prochlorperazine, thioridazine  This list may not describe all possible interactions. Give your health care provider a list of all the medicines, herbs, non-prescription drugs, or dietary supplements you use. Also tell them if you smoke, drink alcohol, or use illegal drugs. Some items may interact with your medicine.  What should I watch for while using this medicine?  Tell your doctor or health care professional if your pain does not go away, if it gets worse, or if you have new or a different type of pain. You may develop tolerance to the medicine. Tolerance means that you will need a higher dose of the medicine for pain relief. Tolerance is normal and is expected if you take this medicine for a long time.  Do not suddenly stop taking your medicine because you may develop a severe reaction. Your body becomes used to the medicine. This does NOT mean you are addicted. Addiction is a behavior related to getting and using a drug for a non-medical reason. If you have pain, you have a medical reason to take pain medicine. Your doctor will tell you how much medicine to take. If your doctor wants you to stop the medicine, the dose will be slowly lowered over time to avoid any side effects.  There are different types of narcotic medicines (opiates). If you take more than one type at the same time or if you are taking another medicine that also causes drowsiness, you may have more side effects. Give your health care provider a list of all medicines you use. Your doctor will tell you how much medicine to take. Do not take more medicine than directed. Call emergency for help if you have problems breathing or unusual sleepiness.  You may get drowsy or dizzy. Do not drive, use machinery, or  do anything that needs mental alertness until you know how the medicine affects you. Do not stand or sit up quickly, especially if you are an older patient. This reduces the risk of dizzy or fainting spells. Alcohol may interfere with the effect of this medicine. Avoid alcoholic drinks.  This medicine will cause constipation. Try to have a bowel movement at least every 2 to 3 days. If you do not have a bowel movement for 3 days, call your doctor or health care professional.  Your mouth may get dry. Chewing sugarless gum or sucking hard candy, and drinking plenty of water may help. Contact your doctor if the problem does not go away or is severe.  What side effects may I notice from receiving this medicine?  Side effects that you should report to your doctor or health care professional as soon as possible:  -allergic reactions like skin rash, itching or hives, swelling of the face, lips, or tongue  -breathing problems  -confusion  -signs and symptoms of low blood pressure like dizziness; feeling faint or lightheaded, falls; unusually weak or tired  -trouble passing urine or change in the amount of urine  -trouble swallowing  Side effects that usually do not require medical attention (report to your doctor or health care professional if they continue or are bothersome):  -constipation  -dry mouth  -nausea, vomiting  -tiredness  This list may not describe all possible side effects. Call your doctor for medical advice about side effects. You may report side effects to FDA at 1-592-FDA-6825.  Where should I keep my medicine?  Keep out of the reach of children. This medicine can be abused. Keep your medicine in a safe place to protect it from theft. Do not share this medicine with anyone. Selling or giving away this medicine is dangerous and against the law.  Store at room temperature between 15 and 30 degrees C (59 and 86 degrees F). Protect from light. Keep container tightly closed.  This medicine may cause accidental  overdose and death if it is taken by other adults, children, or pets. Flush any unused medicine down the toilet to reduce the chance of harm. Do not use the medicine after the expiration date.  NOTE: This sheet is a summary. It may not cover all possible information. If you have questions about this medicine, talk to your doctor, pharmacist, or health care provider.  © 2018 [employer]vier/Gold Standard (2016-11-01 16:55:57)    Oxygen Use at Home  Oxygen can be prescribed for home use. The prescription will show the flow rate. This is how much oxygen is to be used per minute. This will be listed in liters per minute (LPM or L/M). A liter is a metric measurement of volume.  You will use oxygen therapy as directed. It can be used while exercising, sleeping, or at rest. You may need oxygen continuously. Your health care provider may order a blood oxygen test (arterial blood gas or pulse oximetry test) that will show what your oxygen level is. Your health care provider will use these measurements to learn about your needs and follow your progress.  Home oxygen therapy is commonly used on patients with various lung (pulmonary) related conditions. Some of these conditions include:  · Asthma.  · Lung cancer.  · Pneumonia.  · Emphysema.  · Chronic bronchitis.  · Cystic fibrosis.  · Other lung diseases.  · Pulmonary fibrosis.  · Occupational lung disease.  · Heart failure.  · Chronic obstructive pulmonary disease (COPD).  3 COMMON WAYS OF PROVIDING OXYGEN THERAPY  · Gas: The gas form of oxygen is put into variously sized cylinders or tanks. The cylinders or oxygen tanks contain compressed oxygen. The cylinder is equipped with a regulator that controls the flow rate. Because the flow of oxygen out of the cylinder is constant, an oxygen conserving device may be attached to the system to avoid waste. This device releases the gas only when you inhale and cuts it off when you exhale. Oxygen can be provided in a small cylinder that can  be carried with you. Large tanks are heavy and are only for stationary use. After use, empty tanks must be exchanged for full tanks.  · Liquid: The liquid form of oxygen is put into a container similar to a thermos. When released, the liquid converts to a gas and you breathe it in just like the compressed gas. This storage method takes up less space than the compressed gas cylinder, and you can transfer the liquid to a small, portable vessel at home. Liquid oxygen is more expensive than the compressed gas, and the vessel vents when not in use. An oxygen conserving device may be built into the vessel to conserve the oxygen. Liquid oxygen is very cold, around 297° below zero.  · Oxygen concentrator: This medical device filters oxygen from room air and gives almost 100% oxygen to the patient. Oxygen concentrators are powered by electricity. Benefits of this system are:  ¨ It does not need to be resupplied.  ¨ It is not as costly as liquid oxygen.  ¨ Extra tubing permits the user to move around easier.  There are several types of small, portable oxygen systems available which can help you remain active and mobile. You must have a cylinder of oxygen as a backup in the event of a power failure. Advise your electric power company that you are on oxygen therapy in order to get priority service when there is a power failure.  OXYGEN DELIVERY DEVICES  There are 3 common ways to deliver oxygen to your body.  · Nasal cannula. This is a 2-pronged device inserted in the nostrils that is connected to tubing carrying the oxygen. The tubing can rest on the ears or be attached to the frame of eyeglasses.  · Mask. People who need a high flow of oxygen generally use a mask.  · Transtracheal catheter. Transtracheal oxygen therapy requires the insertion of a small, flexible tube (catheter) in the windpipe (trachea). This catheter is held in place by a necklace. Since transtracheal oxygen bypasses the mouth, nose, and throat, a humidifier  "is absolutely required at flow rates of 1 LPM or greater.  OXYGEN USE SAFETY TIPS  · Never smoke while using oxygen. Oxygen does not burn or explode, but flammable materials will burn faster in the presence of oxygen.  · Keep a fire extinguisher close by. Let your fire department know that you have oxygen in your home.  · Warn visitors not to smoke near you when you are using oxygen. Put up \"no smoking\" signs in your home where you most often use the oxygen.  · When you go to a restaurant with your portable oxygen source, ask to be seated in the nonsmoking section.  · Stay at least 5 feet away from gas stoves, candles, lighted fireplaces, or other heat sources.  · Do not use materials that burn easily (flammable) while using your oxygen.  · If you use an oxygen cylinder, make sure it is secured to some fixed object or in a stand. If you use liquid oxygen, make sure the vessel is kept upright to keep the oxygen from pouring out. Liquid oxygen is so cold it can hurt your skin.  · If you use an oxygen concentrator, call your electric company so you will be given priority service if your power goes out. Avoid using extension cords, if possible.  · Regularly test your smoke detectors at home to make sure they work. If you receive care in your home from a nurse or other health care provider, he or she may also check to make sure your smoke detectors work.  GUIDELINES FOR CLEANING YOUR EQUIPMENT  · Wash the nasal prongs with a liquid soap. Thoroughly rinse them once or twice a week.  · Replace the prongs every 2 to 4 weeks. If you have an infection (cold, pneumonia) change them when you are well.  · Your health care provider will give you instructions on how to clean your transtracheal catheter.  · The humidifier bottle should be washed with soap and warm water and rinsed thoroughly between each refill. Air-dry the bottle before filling it with sterile or distilled water. The bottle and its top should be disinfected after " they are cleaned.  · If you use an oxygen concentrator, unplug the unit. Then wipe down the cabinet with a damp cloth and dry it daily. The air filter should be cleaned at least twice a week.  · Follow your home medical equipment and service company's directions for cleaning the compressor filter.  HOME CARE INSTRUCTIONS   · Do not change the flow of oxygen unless directed by your health care provider.  · Do not use alcohol or other sedating drugs unless instructed. They slow your breathing rate.  · Do not use materials that burn easily (flammable) while using your oxygen.  · Always keep a spare tank of oxygen. Plan ahead for holidays when you may not be able to get a prescription filled.  · Use water-based lubricants on your lips or nostrils. Do not use an oil-based product like petroleum jelly.  · To prevent your cheeks or the skin behind your ears from becoming irritated, tuck some gauze under the tubing.  · If you have persistent redness under your nose, call your health care provider.  · When you no longer need oxygen, your doctor will have the oxygen discontinued. Oxygen is not addicting or habit forming.  · Use the oxygen as instructed. Too much oxygen can be harmful and too little will not give you the benefit you need.  · Shortness of breath is not always from a lack of oxygen. If your oxygen level is not the cause of your shortness of breath, taking oxygen will not help.  SEEK MEDICAL CARE IF:   · You have frequent headaches.  · You have shortness of breath or a lasting cough.  · You have anxiety.  · You are confused.  · You are drowsy or sleepy all the time.  · You develop an illness which aggravates your breathing.  · You cannot exercise.  · You are restless.  · You have blue lips or fingernails.  · You have difficult or irregular breathing and it is getting worse.  · You have a fever.     This information is not intended to replace advice given to you by your health care provider. Make sure you discuss  any questions you have with your health care provider.     Document Released: 03/09/2005 Document Revised: 01/08/2016 Document Reviewed: 07/30/2014  [employer] Interactive Patient Education ©2016 [employer]      Implanted Port Home Guide  An implanted port is a type of central line that is placed under the skin. Central lines are used to provide IV access when treatment or nutrition needs to be given through a person's veins. Implanted ports are used for long-term IV access. An implanted port may be placed because:   · You need IV medicine that would be irritating to the small veins in your hands or arms.    · You need long-term IV medicines, such as antibiotics.    · You need IV nutrition for a long period.    · You need frequent blood draws for lab tests.    · You need dialysis.    Implanted ports are usually placed in the chest area, but they can also be placed in the upper arm, the abdomen, or the leg. An implanted port has two main parts:   · Poston. The reservoir is round and will appear as a small, raised area under your skin. The reservoir is the part where a needle is inserted to give medicines or draw blood.    · Catheter. The catheter is a thin, flexible tube that extends from the reservoir. The catheter is placed into a large vein. Medicine that is inserted into the reservoir goes into the catheter and then into the vein.    HOW WILL I CARE FOR MY INCISION SITE?  Do not get the incision site wet. Bathe or shower as directed by your health care provider.   HOW IS MY PORT ACCESSED?  Special steps must be taken to access the port:   · Before the port is accessed, a numbing cream can be placed on the skin. This helps numb the skin over the port site.    · Your health care provider uses a sterile technique to access the port.  ¨ Your health care provider must put on a mask and sterile gloves.  ¨ The skin over your port is cleaned carefully with an antiseptic and allowed to dry.  ¨ The port is gently  "pinched between sterile gloves, and a needle is inserted into the port.  · Only \"non-coring\" port needles should be used to access the port. Once the port is accessed, a blood return should be checked. This helps ensure that the port is in the vein and is not clogged.    · If your port needs to remain accessed for a constant infusion, a clear (transparent) bandage will be placed over the needle site. The bandage and needle will need to be changed every week, or as directed by your health care provider.    · Keep the bandage covering the needle clean and dry. Do not get it wet. Follow your health care provider's instructions on how to take a shower or bath while the port is accessed.    · If your port does not need to stay accessed, no bandage is needed over the port.    WHAT IS FLUSHING?  Flushing helps keep the port from getting clogged. Follow your health care provider's instructions on how and when to flush the port. Ports are usually flushed with saline solution or a medicine called heparin. The need for flushing will depend on how the port is used.   · If the port is used for intermittent medicines or blood draws, the port will need to be flushed:    ¨ After medicines have been given.    ¨ After blood has been drawn.    ¨ As part of routine maintenance.    · If a constant infusion is running, the port may not need to be flushed.    HOW LONG WILL MY PORT STAY IMPLANTED?  The port can stay in for as long as your health care provider thinks it is needed. When it is time for the port to come out, surgery will be done to remove it. The procedure is similar to the one performed when the port was put in.   WHEN SHOULD I SEEK IMMEDIATE MEDICAL CARE?  When you have an implanted port, you should seek immediate medical care if:   · You notice a bad smell coming from the incision site.    · You have swelling, redness, or drainage at the incision site.    · You have more swelling or pain at the port site or the surrounding " area.    · You have a fever that is not controlled with medicine.     This information is not intended to replace advice given to you by your health care provider. Make sure you discuss any questions you have with your health care provider.     Document Released: 12/18/2006 Document Revised: 10/08/2014 Document Reviewed: 08/25/2014  [employer] Patient Education ©2016 [employer]      Shortness of Breath  Shortness of breath means you have trouble breathing. Shortness of breath needs medical care right away.  HOME CARE   · Do not smoke.  · Avoid being around chemicals or things (paint fumes, dust) that may bother your breathing.  · Rest as needed. Slowly begin your normal activities.  · Only take medicines as told by your doctor.  · Keep all doctor visits as told.  GET HELP RIGHT AWAY IF:   · Your shortness of breath gets worse.  · You feel lightheaded, pass out (faint), or have a cough that is not helped by medicine.  · You cough up blood.  · You have pain with breathing.  · You have pain in your chest, arms, shoulders, or belly (abdomen).  · You have a fever.  · You cannot walk up stairs or exercise the way you normally do.  · You do not get better in the time expected.  · You have a hard time doing normal activities even with rest.  · You have problems with your medicines.  · You have any new symptoms.  MAKE SURE YOU:  · Understand these instructions.  · Will watch your condition.  · Will get help right away if you are not doing well or get worse.  This information is not intended to replace advice given to you by your health care provider. Make sure you discuss any questions you have with your health care provider.  Document Released: 06/05/2009 Document Revised: 12/23/2014 Document Reviewed: 03/04/2013  [employer] Patient Education © 2017 [employer]

## 2018-07-28 NOTE — DISCHARGE PLANNING
Received Choice form at 0919  Agency/Facility Name: [facility]  Referral sent per Choice form @ 0919  Call placed to [facility] .

## 2018-07-28 NOTE — DISCHARGE PLANNING
Anticipated Discharge Disposition: home with O2    Action: faxed choice to [facility]    Barriers to Discharge: O2    Plan: follow up with [facility]

## 2018-07-28 NOTE — FACE TO FACE
Face to Face Note  -  Durable Medical Equipment    Ant Ervin M.D. - NPI: [id number]  I certify that this patient is under my care and that they had a durable medical equipment(DME)face to face encounter by myself that meets the physician DME face-to-face encounter requirements with this patient on:    Date of encounter:   Patient:                    MRN:                       YOB: 2016  Debby Carrizales  [id number]  1980     The encounter with the patient was in whole, or in part, for the following medical condition, which is the primary reason for durable medical equipment:  Other - HCAP    I certify that, based on my findings, the following durable medical equipment is medically necessary:  Oxygen.    HOME O2 Saturation Measurements:(Values must be present for Home Oxygen orders)  Room air sat at rest: 87  Room air sat with amb: 86  With liters of O2: 1, O2 sat at rest with O2: 97  With Liters of O2: 2, O2 sat with amb with O2 : 93  Is the patient mobile?: Yes    My Clinical findings support the need for the above equipment due to:  Hypoxia    Supporting Symptoms: None

## 2018-07-30 ENCOUNTER — PATIENT OUTREACH (OUTPATIENT)
Dept: HEALTH INFORMATION MANAGEMENT | Facility: OTHER | Age: 38
End: 2018-07-30

## 2018-07-31 ENCOUNTER — OFFICE VISIT (OUTPATIENT)
Dept: PHYSICAL MEDICINE AND REHAB | Facility: MEDICAL CENTER | Age: 38
End: 2018-07-31
Payer: MEDICARE

## 2018-07-31 VITALS
SYSTOLIC BLOOD PRESSURE: 143 MMHG | BODY MASS INDEX: 30.38 KG/M2 | OXYGEN SATURATION: 95 % | DIASTOLIC BLOOD PRESSURE: 80 MMHG | TEMPERATURE: 98 F | HEART RATE: 100 BPM | WEIGHT: 182.32 LBS | HEIGHT: 65 IN

## 2018-07-31 DIAGNOSIS — M54.50 ACUTE BILATERAL LOW BACK PAIN WITHOUT SCIATICA: ICD-10-CM

## 2018-07-31 DIAGNOSIS — M87.052 AVASCULAR NECROSIS OF BONES OF BOTH HIPS (HCC): ICD-10-CM

## 2018-07-31 DIAGNOSIS — M79.7 FIBROMYALGIA: ICD-10-CM

## 2018-07-31 DIAGNOSIS — M87.051 AVASCULAR NECROSIS OF BONES OF BOTH HIPS (HCC): ICD-10-CM

## 2018-07-31 DIAGNOSIS — G62.9 PERIPHERAL POLYNEUROPATHY: ICD-10-CM

## 2018-07-31 DIAGNOSIS — F11.90 CHRONIC, CONTINUOUS USE OF OPIOIDS: ICD-10-CM

## 2018-07-31 PROCEDURE — 99214 OFFICE O/P EST MOD 30 MIN: CPT | Performed by: PHYSICAL MEDICINE & REHABILITATION

## 2018-07-31 RX ORDER — OXYCODONE HYDROCHLORIDE 20 MG/1
20 TABLET ORAL EVERY 6 HOURS PRN
Qty: 102 TAB | Refills: 0 | Status: SHIPPED | OUTPATIENT
Start: 2018-07-31 | End: 2018-08-28 | Stop reason: SDUPTHER

## 2018-07-31 ASSESSMENT — PAIN SCALES - GENERAL: PAINLEVEL: 7=MODERATE-SEVERE PAIN

## 2018-07-31 NOTE — PROGRESS NOTES
Follow up patient note  Pain Medicine, Interventional spine and sports physiatry, Physical medicine rehabilitation      Chief complaint:   Cervicalgia, hips and knees, bilateral leg pain      HISTORY      HPI  Patient identification/Interval histotry: Debby Carrizales 37 y.o. female who has chronic pain related to rheumatologic disease, peripheral neuropathy and AVN hips, lupus.      She was hospitalized for pneumonia.  She is here with a friend today.  She is on oxygen now and hopes that this will be a temporary thing.    Pain continues and feels about the same.  She continues to feel that the oxycodone after dialysis has helped to improve her pain control..  Pain is still nearly constant pain.      No side effects from medications, mild constipation is managed with colace.  Pain is aching in the shoulders, burning in the hips, legs and feet.  Overall this is stable.       ROS Red Flags :   Chills, Sweats: Denies, no recent fevers  Involuntary Weight Loss: Denies  Bowel/Bladder Incontinence: Denies  Saddle Anesthesia: Denies    PMHx:   Past Medical History:   Diagnosis Date   • Arthritis     all joints,r/t lupus   • Avascular necrosis of bones of both hips (HCC) 10/10/2016   • Clostridium difficile colitis 5/3/2011   • Dialysis patient    • ESBL (extended spectrum beta-lactamase) producing bacteria infection 8/25/2014   • Fibromyalgia    • Hypertension    • Lupus    • Pneumonia    • Psychiatric disorder     anxiety, depression   • Pyelonephritis 11/21/2017   • Renal failure        PSHx:   Past Surgical History:   Procedure Laterality Date   • AV FISTULA CREATION Right 12/9/2017    Procedure: AV FISTULA CREATION-ARM FOR GRAFT;  Surgeon: Lesly Jansen M.D.;  Location: SURGERY [facility];  Service: General   • THROMBECTOMY  12/9/2017    Procedure: THROMBECTOMY;  Surgeon: Lesly Jansen M.D.;  Location: SURGERY [facility];  Service: General   • THROMBECTOMY Right 8/21/2016    Procedure:  THROMBECTOMY - right AV fistula graft with grams;  Surgeon: Shabbir Ardon M.D.;  Location: SURGERY [facility];  Service:    • ANGIOPLASTY BALLOON  8/21/2016    Procedure: ANGIOPLASTY BALLOON;  Surgeon: Shabbir Ardon M.D.;  Location: SURGERY [facility];  Service:    • THROMBECTOMY Right 8/20/2016    Procedure: THROMBECTOMY AV GRAFT;  Surgeon: Shabbir Ardon M.D.;  Location: SURGERY [facility];  Service:    • AV FISTULA CREATION Right 7/12/2016    Procedure: AV FISTULA CREATION WITH GRAFT BRACHIAL AXILLARY;  Surgeon: Shabbir Ardon M.D.;  Location: SURGERY [facility];  Service:    • CLOSED REDUCTION Right 7/5/2016    Procedure: CLOSED REDUCTION- Hip ;  Surgeon: Michael Holman M.D.;  Location: SURGERY [facility];  Service:    • HIP ARTHROPLASTY TOTAL Right 1/18/2016    Procedure: HIP ARTHROPLASTY TOTAL;  Surgeon: Michael Holman M.D.;  Location: [facility];  Service:    • AV FISTULA CREATION  2/3/2015    Performed by Shabbir Ardon M.D. at SURGERY [facility]   • AV FISTULA CREATION  11/14/2014    Performed by Shabbir Ardon M.D. at SURGERY [facility]   • AV FISTULA CREATION  9/9/2014    Performed by Shabbir Ardon M.D. at [facility]   • CATH PLACEMENT  9/9/2014    Performed by Shabbir Ardon M.D. at SURGERY [facility]   • OTHER  5/2011    tracheostomy   • GASTROSCOPY-ENDO  4/27/2011    Performed by PALMIRA DOAN at ENDOSCOPY [facility]   • COLONOSCOPY WITH BIOPSY  4/20/2011    Performed by ALCIRA CRUZ at ENDOSCOPY [facility]   • GASTROSCOPY-ENDO  4/18/2011    Performed by ALCIRA CRUZ at ENDOSCOPY [facility]   • GASTROSCOPY-ENDO  4/10/2011    Performed by SYED JURADO at ENDOSCOPY [facility]   • SCLEROTHERAPHY  4/10/2011    Performed by SYED JURADO at ENDOSCOPY [facility]   • OTHER ABDOMINAL SURGERY      kidney biopsy   • TRACHEOSTOMY         Family history   Denies  neuromuscular disease  Family History   Problem Relation Age of Onset   • Heart Disease Mother    • Cancer Father        Medications:   Current Outpatient Prescriptions   Medication   • Oxycodone HCl 20 MG Tab   • pregabalin (LYRICA) 100 MG Cap   • promethazine (PHENERGAN) 25 MG Tab   • cinacalcet (SENSIPAR) 30 MG Tab   • sevelamer carbonate (RENVELA) 800 MG Tab tablet   • lidocaine (LIDODERM) 5 % Patch   • ferrous sulfate 325 (65 FE) MG tablet   • ascorbic acid (ASCORBIC ACID) 500 MG Tab   • buPROPion (WELLBUTRIN XL) 150 MG XL tablet   • hydroxychloroquine (PLAQUENIL) 200 MG Tab   • cholecalciferol (VITAMIN D3) 5000 UNIT Cap   • trazodone (DESYREL) 50 MG Tab   • tizanidine (ZANAFLEX) 4 MG Tab     No current facility-administered medications for this visit.        Allergies:   Allergies   Allergen Reactions   • Lorazepam [Ativan] Anxiety     Patient becomes severely paranoid and agitated   • Morphine Itching     Tolerates Dilaudid   • Seasonal Runny Nose and Itching     Hay fever, sabiha brush       Social Hx:   Social History     Social History   • Marital status: Single     Spouse name: N/A   • Number of children: N/A   • Years of education: N/A     Occupational History   • Not on file.     Social History Main Topics   • Smoking status: Former Smoker     Packs/day: 0.50     Years: 18.00     Types: Cigarettes     Quit date: 6/13/2011   • Smokeless tobacco: Never Used      Comment: 1/2 ppd   • Alcohol use No   • Drug use: No   • Sexual activity: Not on file     Other Topics Concern   •  Service No   • Blood Transfusions Yes   • Caffeine Concern No   • Occupational Exposure No   • Hobby Hazards No   • Sleep Concern Yes   • Stress Concern Yes   • Weight Concern Yes   • Special Diet Yes   • Back Care No   • Exercise Yes   • Bike Helmet No   • Seat Belt Yes   • Self-Exams Yes     Social History Narrative   • No narrative on file       EXAMINATION     Physical Exam:   Vitals: Blood pressure 143/80, pulse 100,  "temperature 36.7 °C (98 °F), height 1.651 m (5' 5\"), weight 82.7 kg (182 lb 5.1 oz), SpO2 95 %.    Constitutional:   Body Habitus: Body mass index is 30.34 kg/m².  Cooperation: Fully cooperates with exam  Appearance: Well-groomed no disheveled, in no acute distress, wearing oxygen by nasal canula    Respiratory-  breathing comfortable on room air, no wheezing.  Cardiovascular- No pitting edema noted in the lower extremities bilaterally  Psychiatric- alert and oriented ×3. Normal affect.   Spine:  No focal motor deficits noted in the lower extremities bilaterally.  Sensation is intact to light touch bilaterally but with some hyperesthesias distally.  Reflexes are 2+ patella and 1+ achilles bilaterally.  Gait steady without loss of balance.      MEDICAL DECISION MAKING    DATA    Labs:   06/09/2018 TSH normal    Lab Results   Component Value Date/Time    HBA1C 4.9 06/07/2018 02:29 AM          Imaging:   I reviewed the following radiology reports previously reviewed       Chest xray 06/07/2018: Minimal right-sided opacities as described above, suggesting pneumonia.               Results for orders placed during the hospital encounter of 07/19/17   MR-LUMBAR SPINE-W/O    Impression 1.  Diffuse decreased bone marrow signal intensity throughout the lumbar spine and sacrum, presumably secondary to chronic anemia.    2.  Otherwise unremarkable MRI scan of the lumbar spine without contrast.                                    DIAGNOSIS   Visit Diagnoses     ICD-10-CM   1. Fibromyalgia M79.7   2. Acute bilateral low back pain without sciatica M54.5         ASSESSMENT and PLAN:     Debby Carrizales 37 y.o. Female returns for follow-up of her chronic pain related to lupus, AVN hips, low back and peripheral neuropathy    Debby was seen today for follow-up.    Diagnoses and all orders for this visit:    Fibromyalgia  -     Oxycodone HCl 20 MG Tab; Take 1 Tab by mouth every 6 hours as needed (Take up to three a day as " "needed and one additional pill after dialysis (3 times a week)) for up to 30 days.    Acute bilateral low back pain without sciatica  -     Oxycodone HCl 20 MG Tab; Take 1 Tab by mouth every 6 hours as needed (Take up to three a day as needed and one additional pill after dialysis (3 times a week)) for up to 30 days.    We discussed that she has done well with the current medication routine and finds taking one extra dose after dialysis on those days has helped a lot.     No change to medication routine.  She is tolerating this reasonably well.  However, we have discussed referral to the chronic pain mind-body group led by Dr. Farrar.  I believe that she would benefit greatly from this program and she would like to participate in a once a week program.  Unfortunately, she has not heard from this program yet.    We also reviewed progressive muscle relaxation techniques at last visit.  She is somewhat familiar with this technique. Also, we discussed using CBT- I  to assist with sleep.    Thoracic xray still pending.    In prescribing controlled substances to this patient, I certify that I have obtained and reviewed the medical history of Debby Carrizales. I have also made a good ly effort to obtain applicable records from other providers who have treated the patient and records demonstrating the following: report of \"drug-seeking behavior,\" although I suspect that she has organic reasons for pain and will continue to monitor as appropriate..     I have conducted a physical exam and documented it. I have reviewed Ms. Carrizales’s prescription history as maintained by the [place] Prescription Monitoring Program.     I have assessed the patient’s risk for abuse, dependency, and addiction using the validated Opioid Risk Tool available at https://www.mdcalc.com/jaqpwu-kdjj-wxwq-ort-narcotic-abuse.     Given the above, I believe the benefits of controlled substance therapy outweigh the risks. The reasons for " prescribing controlled substances include non-narcotic, oral analgesic alternatives have been inadequate for pain control and in my professional opinion, controlled substances are the only reasonable choice for this patient because she has limitations to medication choices due to being on dialysis.  . Accordingly, I have discussed the risk and benefits, treatment plan, and alternative therapies with the patient.       Follow up: 1 month    Thank you for allowing me to participate in the care of this patient. If you have any questions please not hesitate to contact me.      Please note that this dictation was created using voice recognition software. I have made every reasonable attempt to correct obvious errors but there may be errors of grammar and content that I may have overlooked prior to finalization of this note.      Quinn Chandler MD  Interventional Spine and Sports Physiatry  Physical Medicine and Rehabilitation  [facility]

## 2018-08-10 ENCOUNTER — HOSPITAL ENCOUNTER (OUTPATIENT)
Facility: MEDICAL CENTER | Age: 38
End: 2018-08-13
Attending: EMERGENCY MEDICINE | Admitting: HOSPITALIST
Payer: MEDICARE

## 2018-08-10 ENCOUNTER — APPOINTMENT (OUTPATIENT)
Dept: RADIOLOGY | Facility: MEDICAL CENTER | Age: 38
End: 2018-08-10
Attending: EMERGENCY MEDICINE
Payer: MEDICARE

## 2018-08-10 DIAGNOSIS — N18.9 CHRONIC KIDNEY DISEASE, UNSPECIFIED CKD STAGE: ICD-10-CM

## 2018-08-10 PROBLEM — T82.898A AV FISTULA OCCLUSION (HCC): Status: ACTIVE | Noted: 2018-08-10

## 2018-08-10 LAB
ALBUMIN SERPL BCP-MCNC: 3.5 G/DL (ref 3.2–4.9)
ALBUMIN/GLOB SERPL: 1.1 G/DL
ALP SERPL-CCNC: 84 U/L (ref 30–99)
ALT SERPL-CCNC: 9 U/L (ref 2–50)
ANION GAP SERPL CALC-SCNC: 12 MMOL/L (ref 0–11.9)
APTT PPP: 26.3 SEC (ref 24.7–36)
AST SERPL-CCNC: 13 U/L (ref 12–45)
BASOPHILS # BLD AUTO: 0.9 % (ref 0–1.8)
BASOPHILS # BLD: 0.03 K/UL (ref 0–0.12)
BILIRUB SERPL-MCNC: 0.5 MG/DL (ref 0.1–1.5)
BUN SERPL-MCNC: 23 MG/DL (ref 8–22)
CALCIUM SERPL-MCNC: 7.7 MG/DL (ref 8.5–10.5)
CHLORIDE SERPL-SCNC: 104 MMOL/L (ref 96–112)
CO2 SERPL-SCNC: 25 MMOL/L (ref 20–33)
CREAT SERPL-MCNC: 7.77 MG/DL (ref 0.5–1.4)
EKG IMPRESSION: NORMAL
EOSINOPHIL # BLD AUTO: 0.26 K/UL (ref 0–0.51)
EOSINOPHIL NFR BLD: 6.9 % (ref 0–6.9)
ERYTHROCYTE [DISTWIDTH] IN BLOOD BY AUTOMATED COUNT: 51 FL (ref 35.9–50)
GLOBULIN SER CALC-MCNC: 3.1 G/DL (ref 1.9–3.5)
GLUCOSE SERPL-MCNC: 81 MG/DL (ref 65–99)
HCT VFR BLD AUTO: 32.5 % (ref 37–47)
HGB BLD-MCNC: 10.2 G/DL (ref 12–16)
INR PPP: 1.02 (ref 0.87–1.13)
LYMPHOCYTES # BLD AUTO: 1.46 K/UL (ref 1–4.8)
LYMPHOCYTES NFR BLD: 38.3 % (ref 22–41)
MANUAL DIFF BLD: NORMAL
MCH RBC QN AUTO: 30.2 PG (ref 27–33)
MCHC RBC AUTO-ENTMCNC: 31.4 G/DL (ref 33.6–35)
MCV RBC AUTO: 96.2 FL (ref 81.4–97.8)
MONOCYTES # BLD AUTO: 0.16 K/UL (ref 0–0.85)
MONOCYTES NFR BLD AUTO: 4.3 % (ref 0–13.4)
MORPHOLOGY BLD-IMP: NORMAL
NEUTROPHILS # BLD AUTO: 1.88 K/UL (ref 2–7.15)
NEUTROPHILS NFR BLD: 49.6 % (ref 44–72)
NRBC # BLD AUTO: 0 K/UL
NRBC BLD-RTO: 0 /100 WBC
PLATELET # BLD AUTO: 124 K/UL (ref 164–446)
PLATELET BLD QL SMEAR: NORMAL
PMV BLD AUTO: 10.5 FL (ref 9–12.9)
POTASSIUM SERPL-SCNC: 4.7 MMOL/L (ref 3.6–5.5)
PROT SERPL-MCNC: 6.6 G/DL (ref 6–8.2)
PROTHROMBIN TIME: 13.1 SEC (ref 12–14.6)
RBC # BLD AUTO: 3.38 M/UL (ref 4.2–5.4)
RBC BLD AUTO: NORMAL
SODIUM SERPL-SCNC: 141 MMOL/L (ref 135–145)
WBC # BLD AUTO: 3.8 K/UL (ref 4.8–10.8)

## 2018-08-10 PROCEDURE — G0378 HOSPITAL OBSERVATION PER HR: HCPCS

## 2018-08-10 PROCEDURE — 93005 ELECTROCARDIOGRAM TRACING: CPT

## 2018-08-10 PROCEDURE — 304561 HCHG STAT O2

## 2018-08-10 PROCEDURE — 85730 THROMBOPLASTIN TIME PARTIAL: CPT

## 2018-08-10 PROCEDURE — 85027 COMPLETE CBC AUTOMATED: CPT

## 2018-08-10 PROCEDURE — 71045 X-RAY EXAM CHEST 1 VIEW: CPT

## 2018-08-10 PROCEDURE — 93005 ELECTROCARDIOGRAM TRACING: CPT | Performed by: EMERGENCY MEDICINE

## 2018-08-10 PROCEDURE — 99285 EMERGENCY DEPT VISIT HI MDM: CPT

## 2018-08-10 PROCEDURE — A9270 NON-COVERED ITEM OR SERVICE: HCPCS | Performed by: HOSPITALIST

## 2018-08-10 PROCEDURE — 700111 HCHG RX REV CODE 636 W/ 250 OVERRIDE (IP): Performed by: EMERGENCY MEDICINE

## 2018-08-10 PROCEDURE — 80053 COMPREHEN METABOLIC PANEL: CPT

## 2018-08-10 PROCEDURE — 700102 HCHG RX REV CODE 250 W/ 637 OVERRIDE(OP): Performed by: HOSPITALIST

## 2018-08-10 PROCEDURE — 99220 PR INITIAL OBSERVATION CARE,LEVL III: CPT | Mod: AI | Performed by: HOSPITALIST

## 2018-08-10 PROCEDURE — 85007 BL SMEAR W/DIFF WBC COUNT: CPT

## 2018-08-10 PROCEDURE — 36415 COLL VENOUS BLD VENIPUNCTURE: CPT

## 2018-08-10 PROCEDURE — 85610 PROTHROMBIN TIME: CPT

## 2018-08-10 PROCEDURE — 96374 THER/PROPH/DIAG INJ IV PUSH: CPT

## 2018-08-10 RX ORDER — CINACALCET 30 MG/1
30 TABLET, FILM COATED ORAL
Status: DISCONTINUED | OUTPATIENT
Start: 2018-08-10 | End: 2018-08-13 | Stop reason: HOSPADM

## 2018-08-10 RX ORDER — BUPROPION HYDROCHLORIDE 150 MG/1
150 TABLET, EXTENDED RELEASE ORAL DAILY
Status: DISCONTINUED | OUTPATIENT
Start: 2018-08-11 | End: 2018-08-13 | Stop reason: HOSPADM

## 2018-08-10 RX ORDER — HYDROXYCHLOROQUINE SULFATE 200 MG/1
200 TABLET, FILM COATED ORAL
Status: DISCONTINUED | OUTPATIENT
Start: 2018-08-10 | End: 2018-08-13 | Stop reason: HOSPADM

## 2018-08-10 RX ORDER — TIZANIDINE 4 MG/1
8 TABLET ORAL
Status: DISCONTINUED | OUTPATIENT
Start: 2018-08-10 | End: 2018-08-13 | Stop reason: HOSPADM

## 2018-08-10 RX ORDER — PROMETHAZINE HYDROCHLORIDE 25 MG/1
25 TABLET ORAL EVERY 6 HOURS PRN
Status: DISCONTINUED | OUTPATIENT
Start: 2018-08-10 | End: 2018-08-13 | Stop reason: HOSPADM

## 2018-08-10 RX ORDER — SEVELAMER CARBONATE 800 MG/1
2400 TABLET, FILM COATED ORAL
Status: DISCONTINUED | OUTPATIENT
Start: 2018-08-10 | End: 2018-08-13 | Stop reason: HOSPADM

## 2018-08-10 RX ORDER — TRAZODONE HYDROCHLORIDE 50 MG/1
50 TABLET ORAL
Status: DISCONTINUED | OUTPATIENT
Start: 2018-08-10 | End: 2018-08-13 | Stop reason: HOSPADM

## 2018-08-10 RX ORDER — POLYETHYLENE GLYCOL 3350 17 G/17G
1 POWDER, FOR SOLUTION ORAL
Status: DISCONTINUED | OUTPATIENT
Start: 2018-08-10 | End: 2018-08-13 | Stop reason: HOSPADM

## 2018-08-10 RX ORDER — KETOROLAC TROMETHAMINE 30 MG/ML
15 INJECTION, SOLUTION INTRAMUSCULAR; INTRAVENOUS ONCE
Status: COMPLETED | OUTPATIENT
Start: 2018-08-10 | End: 2018-08-10

## 2018-08-10 RX ORDER — BUPROPION HYDROCHLORIDE 150 MG/1
150 TABLET ORAL EVERY MORNING
Status: DISCONTINUED | OUTPATIENT
Start: 2018-08-11 | End: 2018-08-10

## 2018-08-10 RX ORDER — BISACODYL 10 MG
10 SUPPOSITORY, RECTAL RECTAL
Status: DISCONTINUED | OUTPATIENT
Start: 2018-08-10 | End: 2018-08-13 | Stop reason: HOSPADM

## 2018-08-10 RX ORDER — FERROUS SULFATE 325(65) MG
325 TABLET ORAL DAILY
Status: DISCONTINUED | OUTPATIENT
Start: 2018-08-11 | End: 2018-08-13 | Stop reason: HOSPADM

## 2018-08-10 RX ORDER — OXYCODONE HYDROCHLORIDE 10 MG/1
20 TABLET ORAL EVERY 6 HOURS PRN
Status: DISCONTINUED | OUTPATIENT
Start: 2018-08-10 | End: 2018-08-13 | Stop reason: HOSPADM

## 2018-08-10 RX ORDER — PREGABALIN 25 MG/1
100 CAPSULE ORAL 3 TIMES DAILY
Status: DISCONTINUED | OUTPATIENT
Start: 2018-08-11 | End: 2018-08-13 | Stop reason: HOSPADM

## 2018-08-10 RX ORDER — AMOXICILLIN 250 MG
2 CAPSULE ORAL 2 TIMES DAILY
Status: DISCONTINUED | OUTPATIENT
Start: 2018-08-10 | End: 2018-08-13 | Stop reason: HOSPADM

## 2018-08-10 RX ADMIN — KETOROLAC TROMETHAMINE 15 MG: 30 INJECTION, SOLUTION INTRAMUSCULAR at 21:59

## 2018-08-10 RX ADMIN — OXYCODONE HYDROCHLORIDE 20 MG: 10 TABLET ORAL at 23:29

## 2018-08-10 RX ADMIN — Medication 2 TABLET: at 23:29

## 2018-08-10 ASSESSMENT — PATIENT HEALTH QUESTIONNAIRE - PHQ9
4. FEELING TIRED OR HAVING LITTLE ENERGY: NEARLY EVERY DAY
8. MOVING OR SPEAKING SO SLOWLY THAT OTHER PEOPLE COULD HAVE NOTICED. OR THE OPPOSITE, BEING SO FIGETY OR RESTLESS THAT YOU HAVE BEEN MOVING AROUND A LOT MORE THAN USUAL: NOT AT ALL
5. POOR APPETITE OR OVEREATING: SEVERAL DAYS
1. LITTLE INTEREST OR PLEASURE IN DOING THINGS: NOT AT ALL
3. TROUBLE FALLING OR STAYING ASLEEP OR SLEEPING TOO MUCH: SEVERAL DAYS
6. FEELING BAD ABOUT YOURSELF - OR THAT YOU ARE A FAILURE OR HAVE LET YOURSELF OR YOUR FAMILY DOWN: NOT AL ALL
7. TROUBLE CONCENTRATING ON THINGS, SUCH AS READING THE NEWSPAPER OR WATCHING TELEVISION: NOT AT ALL
SUM OF ALL RESPONSES TO PHQ9 QUESTIONS 1 AND 2: 1
2. FEELING DOWN, DEPRESSED, IRRITABLE, OR HOPELESS: SEVERAL DAYS
SUM OF ALL RESPONSES TO PHQ QUESTIONS 1-9: 6
9. THOUGHTS THAT YOU WOULD BE BETTER OFF DEAD, OR OF HURTING YOURSELF: NOT AT ALL

## 2018-08-10 ASSESSMENT — COPD QUESTIONNAIRES
HAVE YOU SMOKED AT LEAST 100 CIGARETTES IN YOUR ENTIRE LIFE: YES
COPD SCREENING SCORE: 2
DO YOU EVER COUGH UP ANY MUCUS OR PHLEGM?: NO/ONLY WITH OCCASIONAL COLDS OR INFECTIONS
DURING THE PAST 4 WEEKS HOW MUCH DID YOU FEEL SHORT OF BREATH: NONE/LITTLE OF THE TIME
IN THE PAST 12 MONTHS DO YOU DO LESS THAN YOU USED TO BECAUSE OF YOUR BREATHING PROBLEMS: DISAGREE/UNSURE

## 2018-08-10 ASSESSMENT — COGNITIVE AND FUNCTIONAL STATUS - GENERAL
DRESSING REGULAR LOWER BODY CLOTHING: A LITTLE
TURNING FROM BACK TO SIDE WHILE IN FLAT BAD: A LITTLE
CLIMB 3 TO 5 STEPS WITH RAILING: A LITTLE
MOVING TO AND FROM BED TO CHAIR: A LITTLE
MOBILITY SCORE: 18
DAILY ACTIVITIY SCORE: 18
DRESSING REGULAR UPPER BODY CLOTHING: A LITTLE
SUGGESTED CMS G CODE MODIFIER DAILY ACTIVITY: CK
WALKING IN HOSPITAL ROOM: A LITTLE
MOVING FROM LYING ON BACK TO SITTING ON SIDE OF FLAT BED: A LITTLE
PERSONAL GROOMING: A LITTLE
TOILETING: A LITTLE
HELP NEEDED FOR BATHING: A LITTLE
SUGGESTED CMS G CODE MODIFIER MOBILITY: CK
EATING MEALS: A LITTLE
STANDING UP FROM CHAIR USING ARMS: A LITTLE

## 2018-08-10 ASSESSMENT — PAIN SCALES - GENERAL
PAINLEVEL_OUTOF10: 6
PAINLEVEL_OUTOF10: 7

## 2018-08-10 ASSESSMENT — LIFESTYLE VARIABLES
ALCOHOL_USE: NO
EVER_SMOKED: YES

## 2018-08-10 NOTE — ED TRIAGE NOTES
Pt ambulatory to triage. Pt states that her dialysis graft is clotted so she was unable to complete dialysis. Last dialysis was Wednesday. Pt also states that she has a pain in left lower ribs, similar to past episodes of pneumonia. Denies cough or SOB. EKG completed.     Chief Complaint   Patient presents with   • Vascular Access Problem   • Rib Pain     Pt placed in lobby, updated on triage process. Pt educated to notified RN or triage tech if changes in condition.

## 2018-08-11 ENCOUNTER — APPOINTMENT (OUTPATIENT)
Dept: RADIOLOGY | Facility: MEDICAL CENTER | Age: 38
End: 2018-08-11
Attending: SURGERY
Payer: MEDICARE

## 2018-08-11 LAB
ANION GAP SERPL CALC-SCNC: 13 MMOL/L (ref 0–11.9)
B-HCG FREE SERPL-ACNC: <5 MIU/ML
BUN SERPL-MCNC: 27 MG/DL (ref 8–22)
CALCIUM SERPL-MCNC: 7.6 MG/DL (ref 8.5–10.5)
CHLORIDE SERPL-SCNC: 104 MMOL/L (ref 96–112)
CO2 SERPL-SCNC: 25 MMOL/L (ref 20–33)
CREAT SERPL-MCNC: 8.4 MG/DL (ref 0.5–1.4)
ERYTHROCYTE [DISTWIDTH] IN BLOOD BY AUTOMATED COUNT: 51.5 FL (ref 35.9–50)
GLUCOSE SERPL-MCNC: 121 MG/DL (ref 65–99)
HCT VFR BLD AUTO: 31.3 % (ref 37–47)
HGB BLD-MCNC: 9.6 G/DL (ref 12–16)
IHCGL IHCGL: NEGATIVE MIU/ML
MCH RBC QN AUTO: 29.6 PG (ref 27–33)
MCHC RBC AUTO-ENTMCNC: 30.7 G/DL (ref 33.6–35)
MCV RBC AUTO: 96.6 FL (ref 81.4–97.8)
PLATELET # BLD AUTO: 115 K/UL (ref 164–446)
PMV BLD AUTO: 10.3 FL (ref 9–12.9)
POTASSIUM SERPL-SCNC: 4.1 MMOL/L (ref 3.6–5.5)
RBC # BLD AUTO: 3.24 M/UL (ref 4.2–5.4)
SODIUM SERPL-SCNC: 142 MMOL/L (ref 135–145)
WBC # BLD AUTO: 3.3 K/UL (ref 4.8–10.8)

## 2018-08-11 PROCEDURE — 700102 HCHG RX REV CODE 250 W/ 637 OVERRIDE(OP)

## 2018-08-11 PROCEDURE — 160009 HCHG ANES TIME/MIN: Performed by: SURGERY

## 2018-08-11 PROCEDURE — 700102 HCHG RX REV CODE 250 W/ 637 OVERRIDE(OP): Performed by: HOSPITALIST

## 2018-08-11 PROCEDURE — 85027 COMPLETE CBC AUTOMATED: CPT

## 2018-08-11 PROCEDURE — 700105 HCHG RX REV CODE 258

## 2018-08-11 PROCEDURE — 99215 OFFICE O/P EST HI 40 MIN: CPT | Performed by: INTERNAL MEDICINE

## 2018-08-11 PROCEDURE — 500002 HCHG ADHESIVE, DERMABOND: Performed by: SURGERY

## 2018-08-11 PROCEDURE — 501837 HCHG SUTURE CV: Performed by: SURGERY

## 2018-08-11 PROCEDURE — 84702 CHORIONIC GONADOTROPIN TEST: CPT

## 2018-08-11 PROCEDURE — 160041 HCHG SURGERY MINUTES - EA ADDL 1 MIN LEVEL 4: Performed by: SURGERY

## 2018-08-11 PROCEDURE — C1769 GUIDE WIRE: HCPCS | Performed by: SURGERY

## 2018-08-11 PROCEDURE — C1757 CATH, THROMBECTOMY/EMBOLECT: HCPCS | Performed by: SURGERY

## 2018-08-11 PROCEDURE — 90935 HEMODIALYSIS ONE EVALUATION: CPT

## 2018-08-11 PROCEDURE — 160035 HCHG PACU - 1ST 60 MINS PHASE I: Performed by: SURGERY

## 2018-08-11 PROCEDURE — 80048 BASIC METABOLIC PNL TOTAL CA: CPT

## 2018-08-11 PROCEDURE — C2628 CATHETER, OCCLUSION: HCPCS | Performed by: SURGERY

## 2018-08-11 PROCEDURE — 700111 HCHG RX REV CODE 636 W/ 250 OVERRIDE (IP)

## 2018-08-11 PROCEDURE — G0378 HOSPITAL OBSERVATION PER HR: HCPCS

## 2018-08-11 PROCEDURE — A9270 NON-COVERED ITEM OR SERVICE: HCPCS | Performed by: INTERNAL MEDICINE

## 2018-08-11 PROCEDURE — 160002 HCHG RECOVERY MINUTES (STAT): Performed by: SURGERY

## 2018-08-11 PROCEDURE — C1768 GRAFT, VASCULAR: HCPCS | Performed by: SURGERY

## 2018-08-11 PROCEDURE — 501838 HCHG SUTURE GENERAL: Performed by: SURGERY

## 2018-08-11 PROCEDURE — A9270 NON-COVERED ITEM OR SERVICE: HCPCS | Performed by: HOSPITALIST

## 2018-08-11 PROCEDURE — A9270 NON-COVERED ITEM OR SERVICE: HCPCS

## 2018-08-11 PROCEDURE — 160048 HCHG OR STATISTICAL LEVEL 1-5: Performed by: SURGERY

## 2018-08-11 PROCEDURE — 160029 HCHG SURGERY MINUTES - 1ST 30 MINS LEVEL 4: Performed by: SURGERY

## 2018-08-11 PROCEDURE — C1725 CATH, TRANSLUMIN NON-LASER: HCPCS | Performed by: SURGERY

## 2018-08-11 PROCEDURE — 700101 HCHG RX REV CODE 250

## 2018-08-11 PROCEDURE — 96375 TX/PRO/DX INJ NEW DRUG ADDON: CPT | Mod: XU

## 2018-08-11 PROCEDURE — 99226 PR SUBSEQUENT OBSERVATION CARE,LEVEL III: CPT | Performed by: INTERNAL MEDICINE

## 2018-08-11 PROCEDURE — 700111 HCHG RX REV CODE 636 W/ 250 OVERRIDE (IP): Performed by: INTERNAL MEDICINE

## 2018-08-11 PROCEDURE — A6402 STERILE GAUZE <= 16 SQ IN: HCPCS | Performed by: SURGERY

## 2018-08-11 PROCEDURE — 700102 HCHG RX REV CODE 250 W/ 637 OVERRIDE(OP): Performed by: INTERNAL MEDICINE

## 2018-08-11 PROCEDURE — 500382 HCHG DRAIN, PENROSE 1X18: Performed by: SURGERY

## 2018-08-11 PROCEDURE — 160036 HCHG PACU - EA ADDL 30 MINS PHASE I: Performed by: SURGERY

## 2018-08-11 DEVICE — GRAFT GOR STRCH 4-7X45CM: Type: IMPLANTABLE DEVICE | Site: ARM | Status: FUNCTIONAL

## 2018-08-11 RX ORDER — DOXYCYCLINE 100 MG/1
100 TABLET ORAL EVERY 12 HOURS
Status: DISCONTINUED | OUTPATIENT
Start: 2018-08-11 | End: 2018-08-13 | Stop reason: HOSPADM

## 2018-08-11 RX ORDER — OXYCODONE HYDROCHLORIDE AND ACETAMINOPHEN 5; 325 MG/1; MG/1
TABLET ORAL
Status: COMPLETED
Start: 2018-08-11 | End: 2018-08-11

## 2018-08-11 RX ORDER — BUPIVACAINE HYDROCHLORIDE 5 MG/ML
INJECTION, SOLUTION PERINEURAL
Status: DISCONTINUED | OUTPATIENT
Start: 2018-08-11 | End: 2018-08-11 | Stop reason: HOSPADM

## 2018-08-11 RX ORDER — SODIUM CHLORIDE 9 MG/ML
INJECTION, SOLUTION INTRAVENOUS
Status: COMPLETED
Start: 2018-08-11 | End: 2018-08-11

## 2018-08-11 RX ORDER — HEPARIN SODIUM,PORCINE 1000/ML
VIAL (ML) INJECTION
Status: DISCONTINUED | OUTPATIENT
Start: 2018-08-11 | End: 2018-08-11 | Stop reason: HOSPADM

## 2018-08-11 RX ADMIN — OXYCODONE HYDROCHLORIDE 20 MG: 10 TABLET ORAL at 06:32

## 2018-08-11 RX ADMIN — OXYCODONE AND ACETAMINOPHEN 2 TABLET: 5; 325 TABLET ORAL at 11:45

## 2018-08-11 RX ADMIN — HYDROMORPHONE HYDROCHLORIDE 0.5 MG: 10 INJECTION, SOLUTION INTRAMUSCULAR; INTRAVENOUS; SUBCUTANEOUS at 12:30

## 2018-08-11 RX ADMIN — SODIUM CHLORIDE 500 ML: 9 INJECTION, SOLUTION INTRAVENOUS at 04:15

## 2018-08-11 RX ADMIN — OXYCODONE HYDROCHLORIDE 20 MG: 10 TABLET ORAL at 20:49

## 2018-08-11 RX ADMIN — SEVELAMER CARBONATE 2400 MG: 800 TABLET, FILM COATED ORAL at 18:52

## 2018-08-11 RX ADMIN — CINACALCET HYDROCHLORIDE 30 MG: 30 TABLET, COATED ORAL at 20:49

## 2018-08-11 RX ADMIN — CINACALCET HYDROCHLORIDE 30 MG: 30 TABLET, COATED ORAL at 03:43

## 2018-08-11 RX ADMIN — PROMETHAZINE HYDROCHLORIDE 25 MG: 25 TABLET ORAL at 02:41

## 2018-08-11 RX ADMIN — Medication 2 TABLET: at 18:06

## 2018-08-11 RX ADMIN — TIZANIDINE 8 MG: 4 TABLET ORAL at 20:49

## 2018-08-11 RX ADMIN — Medication 2 TABLET: at 06:30

## 2018-08-11 RX ADMIN — PROMETHAZINE HYDROCHLORIDE 25 MG: 25 TABLET ORAL at 18:10

## 2018-08-11 RX ADMIN — HYDROMORPHONE HYDROCHLORIDE 1 MG: 10 INJECTION, SOLUTION INTRAMUSCULAR; INTRAVENOUS; SUBCUTANEOUS at 12:52

## 2018-08-11 RX ADMIN — HYDROMORPHONE HYDROCHLORIDE 0.5 MG: 10 INJECTION, SOLUTION INTRAMUSCULAR; INTRAVENOUS; SUBCUTANEOUS at 12:25

## 2018-08-11 RX ADMIN — FENTANYL CITRATE 50 MCG: 50 INJECTION, SOLUTION INTRAMUSCULAR; INTRAVENOUS at 12:05

## 2018-08-11 RX ADMIN — PREGABALIN 100 MG: 25 CAPSULE ORAL at 06:30

## 2018-08-11 RX ADMIN — TRAZODONE HYDROCHLORIDE 50 MG: 50 TABLET ORAL at 02:35

## 2018-08-11 RX ADMIN — HYDROMORPHONE HYDROCHLORIDE 1 MG: 10 INJECTION, SOLUTION INTRAMUSCULAR; INTRAVENOUS; SUBCUTANEOUS at 18:22

## 2018-08-11 RX ADMIN — TRAZODONE HYDROCHLORIDE 50 MG: 50 TABLET ORAL at 20:49

## 2018-08-11 RX ADMIN — DOXYCYCLINE 100 MG: 100 TABLET ORAL at 18:21

## 2018-08-11 RX ADMIN — HYDROXYCHLOROQUINE SULFATE 200 MG: 200 TABLET, FILM COATED ORAL at 02:35

## 2018-08-11 RX ADMIN — BUPROPION HYDROCHLORIDE 150 MG: 150 TABLET, EXTENDED RELEASE ORAL at 06:32

## 2018-08-11 RX ADMIN — HYDROXYCHLOROQUINE SULFATE 200 MG: 200 TABLET, FILM COATED ORAL at 20:49

## 2018-08-11 RX ADMIN — TIZANIDINE 8 MG: 4 TABLET ORAL at 02:35

## 2018-08-11 RX ADMIN — Medication 325 MG: at 06:32

## 2018-08-11 RX ADMIN — FENTANYL CITRATE 50 MCG: 50 INJECTION, SOLUTION INTRAMUSCULAR; INTRAVENOUS at 11:57

## 2018-08-11 RX ADMIN — PREGABALIN 100 MG: 25 CAPSULE ORAL at 18:04

## 2018-08-11 ASSESSMENT — ENCOUNTER SYMPTOMS
DIARRHEA: 0
FOCAL WEAKNESS: 0
HEARTBURN: 0
SHORTNESS OF BREATH: 1
BLURRED VISION: 0
MYALGIAS: 0
SORE THROAT: 0
SHORTNESS OF BREATH: 0
VOMITING: 0
COUGH: 0
WHEEZING: 0
DIZZINESS: 0
ABDOMINAL PAIN: 0
HEADACHES: 0
PALPITATIONS: 0
NAUSEA: 0
PHOTOPHOBIA: 0
TINGLING: 0
FEVER: 0
DEPRESSION: 0
WEAKNESS: 0
MYALGIAS: 1
CHILLS: 0

## 2018-08-11 ASSESSMENT — PAIN SCALES - GENERAL
PAINLEVEL_OUTOF10: ASSUMED PAIN PRESENT
PAINLEVEL_OUTOF10: 5
PAINLEVEL_OUTOF10: ASSUMED PAIN PRESENT
PAINLEVEL_OUTOF10: 0
PAINLEVEL_OUTOF10: 7
PAINLEVEL_OUTOF10: 6
PAINLEVEL_OUTOF10: 5
PAINLEVEL_OUTOF10: 5
PAINLEVEL_OUTOF10: 6
PAINLEVEL_OUTOF10: 7
PAINLEVEL_OUTOF10: 8
PAINLEVEL_OUTOF10: 0
PAINLEVEL_OUTOF10: 8
PAINLEVEL_OUTOF10: 9

## 2018-08-11 NOTE — OR NURSING
Pt transferred to dialysis room on 2L O2NC. Dr Ardon notified. No apparent distress, VSS, pain is tolerable, no nausea.

## 2018-08-11 NOTE — ASSESSMENT & PLAN NOTE
Patient is followed by Dr. Najjar of nephrology, we will contact his group in the morning to arrange for inpatient dialysis.

## 2018-08-11 NOTE — OP REPORT
DATE OF SERVICE:  08/11/2018    PREOPERATIVE DIAGNOSIS:  Thrombosed right arm brachial axillary arteriovenous   graft.    POSTOPERATIVE DIAGNOSIS:  Thrombosed right arm brachial axillary arteriovenous   graft.    OPERATIONS:  1.  Right arm graft thrombectomy.  2.  PTFE patch angioplasty of venous anastomosis.  3.  Balloon angioplasty, 6x40 mm right arm AV graft.    SURGEON:  Shabbir Ardon MD    ASSISTANT:  None.    ANESTHESIA:  General anesthesia.    ANESTHESIOLOGIST:  Cecil Lerner MD    FINDINGS:  The arterial anastomosis is widely patent and the forearm runoff is   normal.  The graft has mild irregularities that once thrombectomy was   completed were treated with balloon angioplasty and the graft lumen appeared   smooth and unobstructed.  The venous outflow anastomosis had one area of   narrowing as has been illustrated on prior fistulograms.  This was managed by   a patch of PTFE stretch [place]a and excision of some of the thickened tissue   sharply removing a prior patch.  Inflow was excellent and outflow appeared to   be satisfactory as the vein was soft and normal in appearance via proximal to   the graft anastomosis.    DESCRIPTION OF PROCEDURE:  After obtaining informed consent, the patient was   put under general anesthesia.  The right upper extremity and axilla were   prepped with ChloraPrep and draped sterilely.  I palpated the graft and   injected 0.5% Marcaine in the region of the axilla where I was going to work.    I made an incision which was right at the top of the hairline.  The graft was   fairly easily exposed.  I then did a meticulous dissection of the inflow and   outflow veins and the graft anastomosis.  Once this was accomplished, I did a   venotomy in the vein just proximal to the graft and then cut back into the   graft with Reeves scissors.  This showed that there was a narrowed area that   was chronic and not as severe as I expected, but it was the only abnormal   finding in  the area.  The outflow beyond that narrowing was widely patent.    This is consistent with a prior fistulogram image finding.  I passed a #4   Alex and then a #3 Alex.  I removed fresh clot and no chronic-appearing   clot.  The graft was occluded with a balloon irrigation occlusion catheter.  I   injected through this catheter and did an imaging of the graft.  I could see   irregularities and some artifacts that looked as if there was some undermining   of the fibrin with the blood flow.  With balloon inflation to the graft, the   lumen appeared smooth and free of stenosis and there were no pseudoaneurysms   identified.  I chose to patch the outflow, which was the purpose of making the   incision at this location.  I used a piece of stretch [place]-Mulugeta graft.  A 3 cm   long opening was made in the graft and vein.  I cut some of the very   thickened PTFE graft that was over the area of stenosis and contributing to   the reduced lumen that I believe lead to the failure of this graft.  Using a   generous size patch, I sutured it in place with CV-5 [place]-Mulugeta.  When I   established flow, it was immediately excellent with a strong thrill in the   arm.  I used a small amount of Surgiflo.  We had given, during the entire   operation, 6000 units of heparin and I did not reverse that.  I did an   interrupted 3-0 Vicryl deep layer closure and then a subcuticular 4-0 Vicryl   skin closure.  Dermabond was used for dressing.  I also, at the corner of the   incision, used a 2x2 and small Tegaderm where there was a small amount of   oozing.  At the end of the procedure, there was still a thrill in the graft.    Blood loss was less than 100 mL.  Counts were reported to be correct.  The   patient was awakened from anesthesia and taken to the recovery room.       ____________________________________     MD RUBEN Jackson / ISSAC    DD:  08/11/2018 11:44:24  DT:  08/11/2018 12:11:49    D#:  [id number]  Job#:  [id number]

## 2018-08-11 NOTE — PROGRESS NOTES
2 RN skin assessment completed.     All bony prominences intact.    No other compromised skin integrity noted.

## 2018-08-11 NOTE — OR SURGEON
Immediate Post OP Note    PreOp Diagnosis: Thrombosed right arm arteriovenous graft.    PostOp Diagnosis: Same    Procedure(s):  AV FISTULA REVISION- Graft and Thrombectomy - Wound Class: Clean  AV FISTULA THROMBOLYSIS - Wound Class: Clean    Surgeon(s):  Shabbir Ardon M.D.    Anesthesiologist/Type of Anesthesia:  Anesthesiologist: Cecil Lerner M.D./General    Surgical Staff:  Circulator: Lili Sevilla R.N.; Kirill Pérez R.N.; Skip Dowling R.N.  Relief Circulator: Samia Little R.N.  Relief Scrub: AZAR Araujo IV  Scrub Person: Sonido Ralph  Radiology Technologist: Marcelo Woodruff    Specimens removed if any:  * No specimens in log *    Estimated Blood Loss: Less than 100 mL    Findings: Thrombosed right arm brachial axillary AV graft with proximal stenosis secondary to intimal hyperplasia. Evidence of at least 1 prior revisions at the venous anastomosis.    Complications: None apparent at this time.        8/11/2018 11:32 AM Shabbir Ardon M.D.

## 2018-08-11 NOTE — ED PROVIDER NOTES
ED Provider Note    CHIEF COMPLAINT  Chief Complaint   Patient presents with   • Vascular Access Problem   • Rib Pain       HPI  Debby Carrizales is a 37 y.o. female who presents with left-sided chest pain. The patient states she's had the pain the last couple weeks. She states she been admitted a couple of times for pneumonia. She finished her course of antibiotics. She doesn't currently have any fever nor significant cough. She does have left chest wall pain that is worse with inspiration as well as pressure. She does not have any exertional component. The patient also went for dialysis today and her fistula clotted and she is unable to have dialysis through the fistula dysfunction. She does not have any pain or swelling to her lower extremities.    REVIEW OF SYSTEMS  See HPI for further details. All other systems are negative.     PAST MEDICAL HISTORY  Past Medical History:   Diagnosis Date   • Arthritis     all joints,r/t lupus   • Avascular necrosis of bones of both hips (HCC) 10/10/2016   • Clostridium difficile colitis 5/3/2011   • Dialysis patient    • ESBL (extended spectrum beta-lactamase) producing bacteria infection 8/25/2014   • Fibromyalgia    • Hypertension    • Lupus    • Pneumonia    • Psychiatric disorder     anxiety, depression   • Pyelonephritis 11/21/2017   • Renal failure        SOCIAL HISTORY  Social History     Social History   • Marital status: Single     Spouse name: N/A   • Number of children: N/A   • Years of education: N/A     Social History Main Topics   • Smoking status: Former Smoker     Packs/day: 0.50     Years: 18.00     Types: Cigarettes     Quit date: 6/13/2011   • Smokeless tobacco: Never Used      Comment: 1/2 ppd   • Alcohol use No   • Drug use: No   • Sexual activity: Not on file     Other Topics Concern   •  Service No   • Blood Transfusions Yes   • Caffeine Concern No   • Occupational Exposure No   • Hobby Hazards No   • Sleep Concern Yes   • Stress  "Concern Yes   • Weight Concern Yes   • Special Diet Yes   • Back Care No   • Exercise Yes   • Bike Helmet No   • Seat Belt Yes   • Self-Exams Yes     Social History Narrative   • No narrative on file           PHYSICAL EXAM  VITAL SIGNS: /101   Pulse 77   Temp 37.1 °C (98.8 °F)   Resp 18   Ht 1.651 m (5' 5\")   Wt 82.6 kg (182 lb 1.6 oz)   SpO2 96%   BMI 30.30 kg/m²   Constitutional: Well developed, Well nourished, No acute distress, Non-toxic appearance.   HENT: Normocephalic, Atraumatic, tympanic membranes are intact and nonerythematous bilaterally, Oropharynx moist without exudates or erythema, Nose normal.   Eyes: PERRLA, EOMI, Conjunctiva normal.  Neck: Supple without meningismus  Lymphatic: No lymphadenopathy noted.   Cardiovascular: Normal heart rate, Normal rhythm, No murmurs, No rubs, No gallops.   Thorax & Lungs: Normal breath sounds, No respiratory distress, No wheezing, reproducible left-sided chest tenderness.   Abdomen: Bowel sounds normal, Soft, No tenderness, no rebound, no guarding, no distention, No masses, No pulsatile masses.   Skin: Warm, Dry, No erythema, No rash.   Back: No tenderness, No CVA tenderness.   Extremities: No thrill to the right upper extremity where the fistula is present, extremities otherwise Atraumatic with symmetric distal pulses, No edema, No tenderness, No cyanosis, No clubbing.   Neurologic: Alert & oriented x 3, cranial nerves II through XII are intact, Normal motor function, Normal sensory function, No focal deficits noted.   Psychiatric: Affect normal, Judgment normal, Mood normal.     EKG  12-lead EKG interpreted by myself shows a normal sinus rhythm with a ventricular rate 87, normal QRS, normal intervals, no ST segment elevation or depression, T waves inverted in V1 and flattened aVL    RADIOLOGY/PROCEDURES  DX-CHEST-PORTABLE (1 VIEW)   Final Result      Improving left perihilar and basilar opacities.            COURSE & MEDICAL DECISION MAKING  Pertinent " Labs & Imaging studies reviewed. (See chart for details)  This a 37-year-old female who presents to the emergency department with chest wall discomfort. The chest x-ray shows improving opacities and therefore suspected pneumonia is improving. The patient does have reproducible pain and therefore suspected his front chest wall etiology. The patient is not tachycardic or hypoxic to support a pulmonary embolus. From a more concerning standpoint the patient's fistula has failed in her right upper extremity and this will require repair for dialysis. Therefore that the patient to the hospital with plastic surgery consultation and the patient will need dialysis tomorrow prior to discharge.    FINAL IMPRESSION  1. Chest wall pain  2. Renal failure requiring dialysis  3. Clotted fistula right upper extremity     Disposition  The patient will be admitted in stable condition    Electronically signed by: Moustapha Elaine, 8/10/2018 8:02 PM

## 2018-08-11 NOTE — PROGRESS NOTES
From Ed to T414(1) at around 2325. AO x 4, NPO for surgery.  Sips with meds.  + void in restroom.  Medicated for pain per mar.  Port accessed, patent, brisk blood return.  Dressing c/d/i. Plan of care discussed, questions answered, verbalized understanding.

## 2018-08-11 NOTE — ASSESSMENT & PLAN NOTE
Dr. Ardon of vascular surgery was consulted by the emergency room physician and will plan to see the patient in the morning for repair or stenting of the occlusion.  I will keep the patient n.p.o. at midnight.  Once AV fistula is functioning, then we will plan to consult nephrology for inpatient dialysis prior to discharge home.  The patient missed her usual scheduled course today.    8/11: underwent  right arm graft thrombectomy, ptfe patch angioplasty of venous anastomosis, balloon angioplasty 6x40 right arm av graft. Thereafter underwent hd for 3hrs, net UF 1000ml. Per renal rue av graft positive for bruit and thrill.     8/12: fistula with appropriate thrill

## 2018-08-11 NOTE — OR NURSING
Pt to recovery, LMA in place, 6L O2Sat 95% resps unlabored. No apparent distress. VSS, AV fistula bruit and thrill heard on auscultation.

## 2018-08-11 NOTE — H&P
Hospital Medicine History and Physical    Date of Service  8/10/2018    Chief Complaint  Chief Complaint   Patient presents with   • Vascular Access Problem   • Rib Pain       History of Presenting Illness  37 y.o. female who presented on 8/10/2018 with vascular access issues with her AV fistula.  The patient has a known history of end-stage renal disease on hemodialysis Monday Wednesday and Fridays.  She went to her usual dialysis session today and they were unable to access the fistula secondary to a clot.  She was unable to have any of her usual session completed today.  She does report continued chest wall tenderness which is likely related to a recent diagnosis of pneumonia with pleural effusions.   Patient was discharged from our hospital on July 25 after an admission for shortness of breath and chest pain with hypoxia.  Prior to that, she had recently been diagnosed with pneumonia and was on antibiotic therapy which she has completed.  She has had no fevers, chills, nausea or vomiting.  Upon assessment in the ER, the patient is clinically well-appearing with no fever and stable vital signs.  She will be admitted for specialist consultation of her malfunctioning AV fistula.      Primary Care Physician  Sushila Manzano M.D.    Consultants  Dr. Ardon, vascular surgery    Code Status  Full    Review of Systems  Review of Systems   Constitutional: Negative for chills and fever.   HENT: Negative for congestion and sore throat.    Eyes: Negative for photophobia.   Respiratory: Negative for cough, shortness of breath and wheezing.    Cardiovascular: Negative for chest pain and palpitations.        Chest wall/rib pain   Gastrointestinal: Negative for abdominal pain, diarrhea, nausea and vomiting.   Genitourinary: Negative for dysuria.   Musculoskeletal: Negative for myalgias.   Skin: Negative.    Neurological: Negative for dizziness, tingling, focal weakness and headaches.   Psychiatric/Behavioral: Negative for  depression and suicidal ideas.        Past Medical History  Past Medical History:   Diagnosis Date   • Pyelonephritis 11/21/2017   • Avascular necrosis of bones of both hips (HCC) 10/10/2016   • ESBL (extended spectrum beta-lactamase) producing bacteria infection 8/25/2014   • Pneumonia    • Clostridium difficile colitis 5/3/2011   • Arthritis     all joints,r/t lupus   • Dialysis patient    • Fibromyalgia    • Hypertension    • Lupus    • Psychiatric disorder     anxiety, depression   • Renal failure        Surgical History  Past Surgical History:   Procedure Laterality Date   • AV FISTULA CREATION Right 12/9/2017    Procedure: AV FISTULA CREATION-ARM FOR GRAFT;  Surgeon: Lesly Jansen M.D.;  Location: [facility];  Service: General   • THROMBECTOMY  12/9/2017    Procedure: THROMBECTOMY;  Surgeon: Lesly Jansen M.D.;  Location: [facility];  Service: General   • THROMBECTOMY Right 8/21/2016    Procedure: THROMBECTOMY - right AV fistula graft with grams;  Surgeon: Shabbir Ardon M.D.;  Location: [facility];  Service:    • ANGIOPLASTY BALLOON  8/21/2016    Procedure: ANGIOPLASTY BALLOON;  Surgeon: Shabbir Ardon M.D.;  Location: [facility];  Service:    • THROMBECTOMY Right 8/20/2016    Procedure: THROMBECTOMY AV GRAFT;  Surgeon: Shabbir Ardon M.D.;  Location: [facility];  Service:    • AV FISTULA CREATION Right 7/12/2016    Procedure: AV FISTULA CREATION WITH GRAFT BRACHIAL AXILLARY;  Surgeon: Shabbir Ardon M.D.;  Location: [facility];  Service:    • CLOSED REDUCTION Right 7/5/2016    Procedure: CLOSED REDUCTION- Hip ;  Surgeon: Michael Holman M.D.;  Location: [facility];  Service:    • HIP ARTHROPLASTY TOTAL Right 1/18/2016    Procedure: HIP ARTHROPLASTY TOTAL;  Surgeon: Michael Holman M.D.;  Location: [facility];  Service:    • AV FISTULA CREATION  2/3/2015    Performed by Shabbir PRITCHARD  RAMON Ardon at SURGERY [facility]   • AV FISTULA CREATION  11/14/2014    Performed by Shabbir Ardon M.D. at SURGERY [facility]   • AV FISTULA CREATION  9/9/2014    Performed by Shabbir Ardon M.D. at SURGERY [facility]   • CATH PLACEMENT  9/9/2014    Performed by Shabbir Ardon M.D. at SURGERY [facility]   • OTHER  5/2011    tracheostomy   • GASTROSCOPY-ENDO  4/27/2011    Performed by PALMIRA DOAN at ENDOSCOPY [facility]   • COLONOSCOPY WITH BIOPSY  4/20/2011    Performed by ALCIRA CRUZ at ENDOSCOPY [facility]   • GASTROSCOPY-ENDO  4/18/2011    Performed by ALCIRA CRUZ at ENDOSCOPY [facility]   • GASTROSCOPY-ENDO  4/10/2011    Performed by SYED JURADO at ENDOSCOPY [facility]   • SCLEROTHERAPHY  4/10/2011    Performed by SYED JURADO at ENDOSCOPY [facility]   • OTHER ABDOMINAL SURGERY      kidney biopsy   • TRACHEOSTOMY         Medications  No current facility-administered medications on file prior to encounter.      Current Outpatient Prescriptions on File Prior to Encounter   Medication Sig Dispense Refill   • Oxycodone HCl 20 MG Tab Take 1 Tab by mouth every 6 hours as needed (Take up to three a day as needed and one additional pill after dialysis (3 times a week)) for up to 30 days. 102 Tab 0   • pregabalin (LYRICA) 100 MG Cap Take 100 mg by mouth 3 times a day.     • promethazine (PHENERGAN) 25 MG Tab Take 25 mg by mouth every 6 hours as needed for Nausea/Vomiting.     • cinacalcet (SENSIPAR) 30 MG Tab Take 30 mg by mouth every bedtime.     • sevelamer carbonate (RENVELA) 800 MG Tab tablet Take 2,400 mg by mouth 3 times a day, with meals.     • lidocaine (LIDODERM) 5 % Patch Apply 1 Patch to skin as directed every 24 hours. Apply to back     • ferrous sulfate 325 (65 FE) MG tablet Take 325 mg by mouth every day.     • ascorbic acid (ASCORBIC ACID) 500 MG Tab Take 500 mg by mouth every day.     • buPROPion (WELLBUTRIN XL)  150 MG XL tablet Take 150 mg by mouth every morning.     • hydroxychloroquine (PLAQUENIL) 200 MG Tab Take 200 mg by mouth every bedtime.     • cholecalciferol (VITAMIN D3) 5000 UNIT Cap Take 10,000 Units by mouth every day.     • trazodone (DESYREL) 50 MG Tab Take 1 Tab by mouth every bedtime. 30 Tab 0   • tizanidine (ZANAFLEX) 4 MG Tab Take 2 Tabs by mouth every bedtime. 60 Tab 0       Family History  Family History   Problem Relation Age of Onset   • Heart Disease Mother    • Cancer Father        Social History  Social History   Substance Use Topics   • Smoking status: Former Smoker     Packs/day: 0.50     Years: 18.00     Types: Cigarettes     Quit date: 2011   • Smokeless tobacco: Never Used      Comment: 1/2 ppd   • Alcohol use No       Allergies  Allergies   Allergen Reactions   • Lorazepam [Ativan] Anxiety     Patient becomes severely paranoid and agitated   • Morphine Itching     Tolerates Dilaudid   • Seasonal Runny Nose and Itching     Hay fever, sabiha brush        Physical Exam  Laboratory   Hemodynamics  Temp (24hrs), Av.9 °C (98.4 °F), Min:36.6 °C (97.9 °F), Max:37.1 °C (98.8 °F)   Temperature: 37.1 °C (98.8 °F)  Pulse  Av  Min: 69  Max: 100 Heart Rate (Monitored): 70  Blood Pressure: 151/101, NIBP: (!) 166/112      Respiratory      Respiration: 18, Pulse Oximetry: 99 %             Physical Exam   Constitutional: She is oriented to person, place, and time. No distress.   HENT:   Head: Normocephalic and atraumatic.   Right Ear: External ear normal.   Left Ear: External ear normal.   Eyes: EOM are normal. Right eye exhibits no discharge. Left eye exhibits no discharge.   Neck: Neck supple. No JVD present.   Cardiovascular: Normal rate, regular rhythm and normal heart sounds.    Pulmonary/Chest: Effort normal and breath sounds normal. No respiratory distress. She exhibits no tenderness.   Abdominal: Soft. Bowel sounds are normal. She exhibits no distension. There is no tenderness.    Musculoskeletal: Normal range of motion. She exhibits no edema.   Neurological: She is alert and oriented to person, place, and time. No cranial nerve deficit.   Skin: Skin is warm and dry. She is not diaphoretic. No erythema.   Psychiatric: She has a normal mood and affect. Her behavior is normal.   Nursing note and vitals reviewed.    Capillary refill less than 3 seconds, distal pulses intact    Recent Labs      08/10/18   2005   WBC  3.8*   RBC  3.38*   HEMOGLOBIN  10.2*   HEMATOCRIT  32.5*   MCV  96.2   MCH  30.2   MCHC  31.4*   RDW  51.0*   PLATELETCT  124*   MPV  10.5     Recent Labs      08/10/18   2005   SODIUM  141   POTASSIUM  4.7   CHLORIDE  104   CO2  25   GLUCOSE  81   BUN  23*   CREATININE  7.77*   CALCIUM  7.7*     Recent Labs      08/10/18   2005   ALTSGPT  9   ASTSGOT  13   ALKPHOSPHAT  84   TBILIRUBIN  0.5   GLUCOSE  81     Recent Labs      08/10/18   2005   APTT  26.3   INR  1.02             Lab Results   Component Value Date    TROPONINI <0.01 07/20/2018       Imaging  Dx-chest-portable (1 View)    Result Date: 8/10/2018  8/10/2018 8:30 PM HISTORY/REASON FOR EXAM:  Chest Pain. History of pneumonia TECHNIQUE/EXAM DESCRIPTION AND NUMBER OF VIEWS: Single portable view of the chest. COMPARISON: 7/26/2018 FINDINGS: The soft tissues and bony structures are unremarkable. The heart and mediastinal structures are within normal limits. Pulmonary vascularity is normal. Left perihilar and basilar opacities appear slightly improved. There is no effusion or pneumothorax. A catheter discarded pacer lead ejects over the left hemithorax.     Improving left perihilar and basilar opacities.    Dx-chest-portable (1 View)    Result Date: 7/26/2018 7/26/2018 8:44 AM HISTORY/REASON FOR EXAM:  Shortness of Breath. TECHNIQUE/EXAM DESCRIPTION AND NUMBER OF VIEWS: Single portable view of the chest. COMPARISON: 7/20/2018 FINDINGS: Left perihilar and basilar opacity is not significantly changed compared to prior. There  may be a small left pleural effusion. Catheter projects over the left thorax. The cardiomediastinal silhouette is stable. No pneumothorax is seen.     Left perihilar and basilar opacities are not significantly changed. Small left pleural effusion may be present.     Dx-chest-portable (1 View)    Result Date: 7/20/2018 7/20/2018 12:50 AM HISTORY/REASON FOR EXAM:  Chest Pain TECHNIQUE/EXAM DESCRIPTION AND NUMBER OF VIEWS: Single portable view of the chest. COMPARISON: 6/7/2018 FINDINGS: The heart, mediastinum, and jacky are unremarkable. Hazy and linear opacities are present in the left lung base, and a small pleural effusion is suspected. Right lung shows no acute process. Left-sided PICC is unchanged in position.     Interval development of left basilar opacities as described above.    Ct-cta Chest Pulmonary Artery W/ Recons    Result Date: 7/20/2018 7/20/2018 4:13 AM HISTORY/REASON FOR EXAM:  Pain TECHNIQUE/EXAM DESCRIPTION: CT angiogram scan for pulmonary embolism with contrast, with reconstructions. 1.25 mm helical sections were obtained from the lung apices through the lung bases following the rapid bolus administration of 55 mL of Omnipaque 350 nonionic contrast. Thin-section overlapping reconstruction interval was utilized. Coronal reconstructions were generated from the axial data. MIP post processing was performed and utilized for the interpretation. Low dose optimization technique was utilized for this CT exam including automated exposure control and adjustment of the mA and/or kV according to patient size. COMPARISON: None FINDINGS: Pulmonary Embolism: No. Lungs: The right lung is unremarkable. There is left lower lobe atelectasis. A moderate size left pleural effusion is also present, which is partly loculated within the oblique fissure.. Pleura: No pleural effusion. Nodes: No enlarged lymph nodes. Additional findings: There is a 1.8 cm diameter simple appearing cyst in the upper pole of the left kidney.  Calcified splenic granulomata are noted..     1.  No evidence of pulmonary emboli. 2.  Left lower lobe atelectasis with moderate size left pleural effusion.      Assessment/Plan     Anticipate that patient will need less than 2 midnights for management of the discussed medical issues.    * AV fistula occlusion ([facility])   Assessment & Plan    Dr. Ardon of vascular surgery was consulted by the emergency room physician and will plan to see the patient in the morning for repair or stenting of the occlusion.  I will keep the patient n.p.o. at midnight.  Once AV fistula is functioning, then we will plan to consult nephrology for inpatient dialysis prior to discharge home.  The patient missed her usual scheduled course today.        ESRD (end stage renal disease) on dialysis ([facility])- (present on admission)   Assessment & Plan    Patient is followed by Dr. Najjar of nephrology, we will contact his group in the morning to arrange for inpatient dialysis.        Lupus (systemic lupus erythematosus) ([facility])- (present on admission)   Assessment & Plan    Continue home Plaquenil, no acute exacerbation or flare.        Depression- (present on admission)   Assessment & Plan    This is chronic and stable, continue home Wellbutrin.          Prophylaxis: Sequential compression devices for DVT prophylaxis, no PPI indicated, bowel protocol as needed

## 2018-08-11 NOTE — ED NOTES
Pt states her AV fistula is not working. R arm-no thrill noted. Pt has port, requesting port to be accessed.

## 2018-08-11 NOTE — H&P
History and Physical    Date: 8/10/2018    PCP: Sushila Manzano M.D.      CC: Thrombosed right arm AV graft.    HPI: This is a 37 y.o. female who is presenting through the ER with discovery today at dialysis that her right arm 4-7mm AV graft has thrombosed.  Her last intervention was on 5/2/2018 by Dr. Jansen and cutting balloon angioplasty with 6mm X 15 mm cutting balloon.  Those images reviewed and significant outflow stenosis was present and the intervention apparently lasted until now.     Past Medical History:   Diagnosis Date   • Arthritis     all joints,r/t lupus   • Avascular necrosis of bones of both hips (HCC) 10/10/2016   • Clostridium difficile colitis 5/3/2011   • Dialysis patient    • ESBL (extended spectrum beta-lactamase) producing bacteria infection 8/25/2014   • Fibromyalgia    • Hypertension    • Lupus    • Pneumonia    • Psychiatric disorder     anxiety, depression   • Pyelonephritis 11/21/2017   • Renal failure        Past Surgical History:   Procedure Laterality Date   • AV FISTULA CREATION Right 12/9/2017    Procedure: AV FISTULA CREATION-ARM FOR GRAFT;  Surgeon: Lesly Jansen M.D.;  Location: SURGERY [facility];  Service: General   • THROMBECTOMY  12/9/2017    Procedure: THROMBECTOMY;  Surgeon: Lesly Jansen M.D.;  Location: [facility];  Service: General   • THROMBECTOMY Right 8/21/2016    Procedure: THROMBECTOMY - right AV fistula graft with grams;  Surgeon: Shabbir Ardon M.D.;  Location: SURGERY [facility];  Service:    • ANGIOPLASTY BALLOON  8/21/2016    Procedure: ANGIOPLASTY BALLOON;  Surgeon: Shabbir Ardon M.D.;  Location: SURGERY [facility];  Service:    • THROMBECTOMY Right 8/20/2016    Procedure: THROMBECTOMY AV GRAFT;  Surgeon: Shabbir Ardon M.D.;  Location: [facility];  Service:    • AV FISTULA CREATION Right 7/12/2016    Procedure: AV FISTULA CREATION WITH GRAFT BRACHIAL AXILLARY;  Surgeon: Shabbir Ardon  M.D.;  Location: SURGERY [facility];  Service:    • CLOSED REDUCTION Right 7/5/2016    Procedure: CLOSED REDUCTION- Hip ;  Surgeon: Michael Holman M.D.;  Location: SURGERY [facility];  Service:    • HIP ARTHROPLASTY TOTAL Right 1/18/2016    Procedure: HIP ARTHROPLASTY TOTAL;  Surgeon: Michael Holman M.D.;  Location: SURGERY [facility];  Service:    • AV FISTULA CREATION  2/3/2015    Performed by Shabbir Ardon M.D. at SURGERY [facility]   • AV FISTULA CREATION  11/14/2014    Performed by Shabbir Ardon M.D. at [facility]   • AV FISTULA CREATION  9/9/2014    Performed by Shabbir Ardon M.D. at [facility]   • CATH PLACEMENT  9/9/2014    Performed by Shabbir Ardon M.D. at [facility]   • OTHER  5/2011    tracheostomy   • GASTROSCOPY-ENDO  4/27/2011    Performed by PALMIRA DOAN at ENDOSCOPY [facility]   • COLONOSCOPY WITH BIOPSY  4/20/2011    Performed by ALCIRA CRUZ at [facility]   • GASTROSCOPY-ENDO  4/18/2011    Performed by ALCIRA CRUZ at ENDOSCOPY [facility]   • GASTROSCOPY-ENDO  4/10/2011    Performed by SYED JURADO at [facility]   • SCLEROTHERAPHY  4/10/2011    Performed by SYED JURADO at [facility]   • OTHER ABDOMINAL SURGERY      kidney biopsy   • TRACHEOSTOMY         Current Facility-Administered Medications   Medication Dose Route Frequency Provider Last Rate Last Dose   • ketorolac (TORADOL) injection 15 mg  15 mg Intravenous Once Moustapha Elaine M.D.         Current Outpatient Prescriptions   Medication Sig Dispense Refill   • Oxycodone HCl 20 MG Tab Take 1 Tab by mouth every 6 hours as needed (Take up to three a day as needed and one additional pill after dialysis (3 times a week)) for up to 30 days. 102 Tab 0   • pregabalin (LYRICA) 100 MG Cap Take 100 mg by mouth 3 times a day.     • promethazine (PHENERGAN) 25 MG Tab Take 25 mg by mouth  every 6 hours as needed for Nausea/Vomiting.     • cinacalcet (SENSIPAR) 30 MG Tab Take 30 mg by mouth every bedtime.     • sevelamer carbonate (RENVELA) 800 MG Tab tablet Take 2,400 mg by mouth 3 times a day, with meals.     • lidocaine (LIDODERM) 5 % Patch Apply 1 Patch to skin as directed every 24 hours. Apply to back     • ferrous sulfate 325 (65 FE) MG tablet Take 325 mg by mouth every day.     • ascorbic acid (ASCORBIC ACID) 500 MG Tab Take 500 mg by mouth every day.     • buPROPion (WELLBUTRIN XL) 150 MG XL tablet Take 150 mg by mouth every morning.     • hydroxychloroquine (PLAQUENIL) 200 MG Tab Take 200 mg by mouth every bedtime.     • cholecalciferol (VITAMIN D3) 5000 UNIT Cap Take 10,000 Units by mouth every day.     • trazodone (DESYREL) 50 MG Tab Take 1 Tab by mouth every bedtime. 30 Tab 0   • tizanidine (ZANAFLEX) 4 MG Tab Take 2 Tabs by mouth every bedtime. 60 Tab 0        Social History     Social History   • Marital status: Single     Spouse name: N/A   • Number of children: N/A   • Years of education: N/A     Occupational History   • Not on file.     Social History Main Topics   • Smoking status: Former Smoker     Packs/day: 0.50     Years: 18.00     Types: Cigarettes     Quit date: 6/13/2011   • Smokeless tobacco: Never Used      Comment: 1/2 ppd   • Alcohol use No   • Drug use: No   • Sexual activity: Not on file     Other Topics Concern   •  Service No   • Blood Transfusions Yes   • Caffeine Concern No   • Occupational Exposure No   • Hobby Hazards No   • Sleep Concern Yes   • Stress Concern Yes   • Weight Concern Yes   • Special Diet Yes   • Back Care No   • Exercise Yes   • Bike Helmet No   • Seat Belt Yes   • Self-Exams Yes     Social History Narrative   • No narrative on file       Family History   Problem Relation Age of Onset   • Heart Disease Mother    • Cancer Father        Allergies:  Lorazepam [ativan]; Morphine; and Seasonal    Review of Systems:  Negative except for recent  hospitalization for pneumonia.    Physical Exam  Alert and oriented. In no distress.  Chest: lungs clear  Heart:  RSR, no murmur or gallop. Radial pulses palpable. The right arm AV graft is pulseless and no bruit.  Neuro grossly intact.      Vital Signs  Blood Pressure: 151/101   Temperature: 37.1 °C (98.8 °F)   Pulse: 76   Respiration: 18   Pulse Oximetry: 99 %        Labs:  Recent Labs      08/10/18   2005   WBC  3.8*   RBC  3.38*   HEMOGLOBIN  10.2*   HEMATOCRIT  32.5*   MCV  96.2   MCH  30.2   MCHC  31.4*   RDW  51.0*   PLATELETCT  124*   MPV  10.5     Recent Labs      08/10/18   2005   SODIUM  141   POTASSIUM  4.7   CHLORIDE  104   CO2  25   GLUCOSE  81   BUN  23*   CREATININE  7.77*   CALCIUM  7.7*     Recent Labs      08/10/18   2005   APTT  26.3   INR  1.02     Recent Labs      08/10/18   2005   ASTSGOT  13   ALTSGPT  9   TBILIRUBIN  0.5   ALKPHOSPHAT  84   GLOBULIN  3.1   INR  1.02       Radiology:  DX-CHEST-PORTABLE (1 VIEW)   Final Result      Improving left perihilar and basilar opacities.                 Assessment and Plan:This is a 37 y.o. Stage 5 kidney secondary to Lupus has thrombosis of right arm AV graft created 7/12/2016 and last intervention with angioplasties on 5/2/2018.  I plan open revision of venous anastomosis as the most effective way to get lasting revision.  This will be under general anesthesia.                s

## 2018-08-11 NOTE — PROGRESS NOTES
Assumed care at 0700. Received report from night shift RN. Bedside report completed. AOx4.    Denies pain at this time.  Denies nausea.  NPO for procedure. +voiding. IVF infusing tko for central line. Ambulating with steady gait. Pt call light and belongings within reach, fall precautions in place.   Plan for procedure today.

## 2018-08-12 ENCOUNTER — APPOINTMENT (OUTPATIENT)
Dept: RADIOLOGY | Facility: MEDICAL CENTER | Age: 38
End: 2018-08-12
Attending: INTERNAL MEDICINE
Payer: MEDICARE

## 2018-08-12 PROBLEM — G89.4 CHRONIC PAIN DISORDER: Status: ACTIVE | Noted: 2018-08-12

## 2018-08-12 PROCEDURE — 700111 HCHG RX REV CODE 636 W/ 250 OVERRIDE (IP): Performed by: INTERNAL MEDICINE

## 2018-08-12 PROCEDURE — 700102 HCHG RX REV CODE 250 W/ 637 OVERRIDE(OP): Performed by: HOSPITALIST

## 2018-08-12 PROCEDURE — 99213 OFFICE O/P EST LOW 20 MIN: CPT | Performed by: INTERNAL MEDICINE

## 2018-08-12 PROCEDURE — A9270 NON-COVERED ITEM OR SERVICE: HCPCS | Performed by: HOSPITALIST

## 2018-08-12 PROCEDURE — 700101 HCHG RX REV CODE 250: Performed by: INTERNAL MEDICINE

## 2018-08-12 PROCEDURE — 99226 PR SUBSEQUENT OBSERVATION CARE,LEVEL III: CPT | Performed by: INTERNAL MEDICINE

## 2018-08-12 PROCEDURE — 96376 TX/PRO/DX INJ SAME DRUG ADON: CPT

## 2018-08-12 PROCEDURE — A9270 NON-COVERED ITEM OR SERVICE: HCPCS | Performed by: INTERNAL MEDICINE

## 2018-08-12 PROCEDURE — G0378 HOSPITAL OBSERVATION PER HR: HCPCS

## 2018-08-12 PROCEDURE — 700102 HCHG RX REV CODE 250 W/ 637 OVERRIDE(OP): Performed by: INTERNAL MEDICINE

## 2018-08-12 PROCEDURE — 71045 X-RAY EXAM CHEST 1 VIEW: CPT

## 2018-08-12 RX ORDER — OMEPRAZOLE 20 MG/1
20 CAPSULE, DELAYED RELEASE ORAL DAILY
Status: DISCONTINUED | OUTPATIENT
Start: 2018-08-13 | End: 2018-08-13 | Stop reason: HOSPADM

## 2018-08-12 RX ORDER — LIDOCAINE 50 MG/G
1 PATCH TOPICAL EVERY 24 HOURS
Status: DISCONTINUED | OUTPATIENT
Start: 2018-08-12 | End: 2018-08-13 | Stop reason: HOSPADM

## 2018-08-12 RX ORDER — CALCIUM CARBONATE 500 MG/1
500 TABLET, CHEWABLE ORAL ONCE
Status: COMPLETED | OUTPATIENT
Start: 2018-08-12 | End: 2018-08-12

## 2018-08-12 RX ADMIN — HYDROMORPHONE HYDROCHLORIDE 1 MG: 10 INJECTION, SOLUTION INTRAMUSCULAR; INTRAVENOUS; SUBCUTANEOUS at 11:08

## 2018-08-12 RX ADMIN — HYDROXYCHLOROQUINE SULFATE 200 MG: 200 TABLET, FILM COATED ORAL at 20:22

## 2018-08-12 RX ADMIN — CINACALCET HYDROCHLORIDE 30 MG: 30 TABLET, COATED ORAL at 20:22

## 2018-08-12 RX ADMIN — OXYCODONE HYDROCHLORIDE 20 MG: 10 TABLET ORAL at 03:23

## 2018-08-12 RX ADMIN — SEVELAMER CARBONATE 2400 MG: 800 TABLET, FILM COATED ORAL at 18:37

## 2018-08-12 RX ADMIN — DOXYCYCLINE 100 MG: 100 TABLET ORAL at 05:20

## 2018-08-12 RX ADMIN — OXYCODONE HYDROCHLORIDE 20 MG: 10 TABLET ORAL at 21:39

## 2018-08-12 RX ADMIN — Medication 2 TABLET: at 05:20

## 2018-08-12 RX ADMIN — SEVELAMER CARBONATE 2400 MG: 800 TABLET, FILM COATED ORAL at 09:19

## 2018-08-12 RX ADMIN — BUPROPION HYDROCHLORIDE 150 MG: 150 TABLET, EXTENDED RELEASE ORAL at 05:20

## 2018-08-12 RX ADMIN — Medication 2 TABLET: at 18:37

## 2018-08-12 RX ADMIN — TIZANIDINE 8 MG: 4 TABLET ORAL at 20:22

## 2018-08-12 RX ADMIN — PREGABALIN 100 MG: 25 CAPSULE ORAL at 05:20

## 2018-08-12 RX ADMIN — OXYCODONE HYDROCHLORIDE 20 MG: 10 TABLET ORAL at 15:20

## 2018-08-12 RX ADMIN — PROMETHAZINE HYDROCHLORIDE 25 MG: 25 TABLET ORAL at 15:20

## 2018-08-12 RX ADMIN — Medication 325 MG: at 05:20

## 2018-08-12 RX ADMIN — PROMETHAZINE HYDROCHLORIDE 25 MG: 25 TABLET ORAL at 05:19

## 2018-08-12 RX ADMIN — LIDOCAINE 1 PATCH: 50 PATCH TOPICAL at 12:36

## 2018-08-12 RX ADMIN — DOXYCYCLINE 100 MG: 100 TABLET ORAL at 17:47

## 2018-08-12 RX ADMIN — SEVELAMER CARBONATE 2400 MG: 800 TABLET, FILM COATED ORAL at 13:30

## 2018-08-12 RX ADMIN — OXYCODONE HYDROCHLORIDE 20 MG: 10 TABLET ORAL at 09:20

## 2018-08-12 RX ADMIN — ANTACID TABLETS 500 MG: 500 TABLET, CHEWABLE ORAL at 18:37

## 2018-08-12 RX ADMIN — PREGABALIN 100 MG: 25 CAPSULE ORAL at 11:07

## 2018-08-12 RX ADMIN — PREGABALIN 100 MG: 25 CAPSULE ORAL at 17:47

## 2018-08-12 RX ADMIN — TRAZODONE HYDROCHLORIDE 50 MG: 50 TABLET ORAL at 20:23

## 2018-08-12 ASSESSMENT — ENCOUNTER SYMPTOMS
HEADACHES: 0
CHILLS: 0
SHORTNESS OF BREATH: 1
HEMOPTYSIS: 0
BLURRED VISION: 0
MYALGIAS: 1
FEVER: 0
DIZZINESS: 0
BACK PAIN: 1
PALPITATIONS: 0
NAUSEA: 0
DOUBLE VISION: 0
COUGH: 0
DEPRESSION: 0
HEARTBURN: 0

## 2018-08-12 ASSESSMENT — PAIN SCALES - GENERAL
PAINLEVEL_OUTOF10: 7
PAINLEVEL_OUTOF10: 7
PAINLEVEL_OUTOF10: 6
PAINLEVEL_OUTOF10: 7
PAINLEVEL_OUTOF10: 5
PAINLEVEL_OUTOF10: 7
PAINLEVEL_OUTOF10: 7

## 2018-08-12 NOTE — ASSESSMENT & PLAN NOTE
Recent dx of pna  Currently on o2 3 litres  Add doxycycline 100 bid  F/u cxr tomorrow    8/12: cxr : There is bibasilar atelectasis, left greater than right, stable. Left Groshong catheter is again seen. There is no evidence of focal consolidation or evidence of pulmonary edema.  There is stable small left pleural effusion.  The heart is enlarged.    - ordered is  - complete doxy

## 2018-08-12 NOTE — PROGRESS NOTES
Pt returned from dialysis 1745  Dressing in place to Right arm av fistula. Bruit and thrill present.  Pt in pain and requesting break through pain meds- MD at bedside notified, new orders placed and will medicate when available  Pt c/o nausea-medicated per MAR  Call light and personal belongings within reach

## 2018-08-12 NOTE — PROGRESS NOTES
"[facility] Hospitalist Progress Note    Date of Service: 8/11/2018    Chief Complaint  37 y.o. female admitted 8/10/2018 with vascular access issues with her AV fistula.  The patient has a known history of end-stage renal disease on hemodialysis Monday Wednesday and Fridays.  She went to her usual dialysis session today and they were unable to access the fistula secondary to a clot.  She was unable to have any of her usual session completed today.  She does report continued chest wall tenderness which is likely related to a recent diagnosis of pneumonia with pleural effusions.   Patient was discharged from our hospital on July 25 after an admission for shortness of breath and chest pain with hypoxia.  Prior to that, she had recently been diagnosed with pneumonia and was on antibiotic therapy which she has completed.  She has had no fevers, chills, nausea or vomiting.  Upon assessment in the ER, the patient is clinically well-appearing with no fever and stable vital signs.  She will be admitted for specialist consultation of her malfunctioning AV fistula.\"    Interval Problem Update    8/11:underwent  right arm graft thrombectomy, ptfe patch angioplasty of venous anastomosis, balloon angioplasty 6x40 right arm av graft. Thereafter underwent hd for 3hrs, net UF 1000ml. Per renal rue av graft positive for bruit and thrill.      Patient states \" I recently had pna, sent home with oxygen.Consults \" reviewed earlier cxr, will add doxcycline. Monitor pulse ox trend overnight. Also she is reporting pain around the fistula site    Consultants/Specialty  Renal, vascular surgery    Disposition  Monitor oxygen sats, f/u cxr am,  overnight observation. Home tomorrow        Review of Systems   Constitutional: Negative for chills and fever.   Eyes: Negative for blurred vision.   Respiratory: Positive for shortness of breath. Negative for cough.    Cardiovascular: Negative for chest pain.   Gastrointestinal: Negative for heartburn and " nausea.   Genitourinary: Negative for dysuria and urgency.   Musculoskeletal: Positive for myalgias.   Skin: Negative for rash.   Neurological: Negative for dizziness and weakness.   Psychiatric/Behavioral: Negative for depression.      Physical Exam  Laboratory/Imaging   Hemodynamics  Temp (24hrs), Av.7 °C (98 °F), Min:36.2 °C (97.1 °F), Max:37.1 °C (98.8 °F)   Temperature: 36.3 °C (97.4 °F)  Pulse  Av.1  Min: 66  Max: 100 Heart Rate (Monitored): 78  Blood Pressure: 126/88, NIBP: 128/83      Respiratory      Respiration: 17, Pulse Oximetry: 98 %        RML Breath Sounds: Diminished, RLL Breath Sounds: Diminished, LLL Breath Sounds: Diminished    Fluids    Intake/Output Summary (Last 24 hours) at 18 1755  Last data filed at 18 1733   Gross per 24 hour   Intake              690 ml   Output             1400 ml   Net             -710 ml       Nutrition  Orders Placed This Encounter   Procedures   • Diet Order Renal     Standing Status:   Standing     Number of Occurrences:   1     Order Specific Question:   Diet:     Answer:   Renal [8]     Physical Exam   Constitutional: She is oriented to person, place, and time. No distress.   Young female, back from dialysis, using oxygen   Eyes: Left eye exhibits no discharge.   Neck: Neck supple.   Cardiovascular: Normal rate and regular rhythm.    Pulmonary/Chest: Effort normal. She has rales.   Abdominal: Soft. Bowel sounds are normal. She exhibits no distension. There is no tenderness.   Musculoskeletal: She exhibits no edema or tenderness.   Neurological: She is alert and oriented to person, place, and time.   Skin: Skin is warm and dry.   Rue fistula (+) bruit, thrill   Psychiatric: She has a normal mood and affect.       Recent Labs      08/10/18   2005  08/11/18   0415   WBC  3.8*  3.3*   RBC  3.38*  3.24*   HEMOGLOBIN  10.2*  9.6*   HEMATOCRIT  32.5*  31.3*   MCV  96.2  96.6   MCH  30.2  29.6   MCHC  31.4*  30.7*   RDW  51.0*  51.5*   PLATELETCT  124*   115*   MPV  10.5  10.3     Recent Labs      08/10/18   2005  08/11/18   0415   SODIUM  141  142   POTASSIUM  4.7  4.1   CHLORIDE  104  104   CO2  25  25   GLUCOSE  81  121*   BUN  23*  27*   CREATININE  7.77*  8.40*   CALCIUM  7.7*  7.6*     Recent Labs      08/10/18   2005   APTT  26.3   INR  1.02                  Assessment/Plan     * AV fistula occlusion ([facility])   Assessment & Plan    Dr. Ardon of vascular surgery was consulted by the emergency room physician and will plan to see the patient in the morning for repair or stenting of the occlusion.  I will keep the patient n.p.o. at midnight.  Once AV fistula is functioning, then we will plan to consult nephrology for inpatient dialysis prior to discharge home.  The patient missed her usual scheduled course today.    8/11: underwent  right arm graft thrombectomy, ptfe patch angioplasty of venous anastomosis, balloon angioplasty 6x40 right arm av graft. Thereafter underwent hd for 3hrs, net UF 1000ml. Per renal rue av graft positive for bruit and thrill.              Pneumonia- (present on admission)   Assessment & Plan    Recent dx of pna  Currently on o2 3 litres  Add doxycycline 100 bid  F/u cxr tomorrow        ESRD (end stage renal disease) on dialysis ([facility])- (present on admission)   Assessment & Plan    Patient is followed by Dr. Najjar of nephrology, we will contact his group in the morning to arrange for inpatient dialysis.        Lupus (systemic lupus erythematosus) ([facility])- (present on admission)   Assessment & Plan    Continue home Plaquenil, no acute exacerbation or flare.        Depression- (present on admission)   Assessment & Plan    This is chronic and stable, continue home Wellbutrin.          Quality-Core Measures   Reviewed items::  Labs reviewed, Medications reviewed and Radiology images reviewed  Taylor catheter::  No Taylor  DVT prophylaxis - mechanical:  Not indicated at this time, ambulatory

## 2018-08-12 NOTE — CARE PLAN
Problem: Pain Management  Goal: Pain level will decrease to patient's comfort goal  Outcome: PROGRESSING AS EXPECTED  Pt presents with chronic pain, currently not controlled. Added lidocaine patch today and one time dose of IV dilaudid.   RUE fistula site sore but majority of pain is generalized, back and left chest pain with inspiration.     Problem: Respiratory:  Goal: Respiratory status will improve  Outcome: PROGRESSING AS EXPECTED  Saturating >90% on RA. Pain with inspiration, hx PNA aprox 2 wks ago. Working on controlling pain today to optimize respiratory status. IS only able to pull 500-750 at this time. Encouraged frequent use.

## 2018-08-12 NOTE — PROGRESS NOTES
PO 1 right arm arteriovenous graft thrombectomy and revision venous anastomosis.  Some pain.  Excellent bruit.  Can be discharged from vascular standpoint.

## 2018-08-12 NOTE — CARE PLAN
Problem: Safety  Goal: Will remain free from falls  Outcome: PROGRESSING AS EXPECTED  Patient educated to use call light to call nursing staff if assistance is needed.     Problem: Pain Management  Goal: Pain level will decrease to patient's comfort goal  Outcome: PROGRESSING AS EXPECTED  PRN pain medication effectively reduces patient's pain at this time

## 2018-08-12 NOTE — PROGRESS NOTES
Pt A&Ox4, sitting up in bed.  AV fistula to R upper arm with bruit and thrill present.   Left upper chest port running TKO, dressing CDI.   Tolerating renal diet, denies n/v. + bowel sounds, + flatus, LBM 8/10.  Saturating >90% on RA. Denies SOB. Denies cough. States some pain with deep breaths to left chest. Heat pack provided.   Pt ambulates SBA, calls appropriately for assistance.  Updated on plan of care. Safety education provided. Bed locked in low. Call light within reach. Rounding in place.

## 2018-08-12 NOTE — PROGRESS NOTES
"[facility] Hospitalist Progress Note    Date of Service: 8/12/2018    Chief Complaint  37 y.o. female admitted 8/10/2018 with vascular access issues with her AV fistula.  The patient has a known history of end-stage renal disease on hemodialysis Monday Wednesday and Fridays.  She went to her usual dialysis session today and they were unable to access the fistula secondary to a clot.  She was unable to have any of her usual session completed today.  She does report continued chest wall tenderness which is likely related to a recent diagnosis of pneumonia with pleural effusions.   Patient was discharged from our hospital on July 25 after an admission for shortness of breath and chest pain with hypoxia.  Prior to that, she had recently been diagnosed with pneumonia and was on antibiotic therapy which she has completed.  She has had no fevers, chills, nausea or vomiting.  Upon assessment in the ER, the patient is clinically well-appearing with no fever and stable vital signs.  She will be admitted for specialist consultation of her malfunctioning AV fistula.\"    Interval Problem Update     8/11:underwent  right arm graft thrombectomy, ptfe patch angioplasty of venous anastomosis, balloon angioplasty 6x40 right arm av graft. Thereafter underwent hd for 3hrs, net UF 1000ml. Per renal rue av graft positive for bruit and thrill.      Patient states \" I recently had pna, sent home with oxygen.Consults \" reviewed earlier cxr, will add doxcycline. Monitor pulse ox trend overnight. Also she is reporting pain around the fistula site    8/12: reviewed cxr which is suggestive of atelectasis, ordered IS. Patient's breathing is shallow due to left hip and rib pain. Overall she looks stable but her chronic pain interfering with optimal breathing, ordered dilaudid 1mg x 1 again.     Consultants/Specialty  Renal, vascular surgery    Disposition  Home after dialysis tomorrow        Review of Systems   Constitutional: Negative for fever. "   HENT: Negative for hearing loss.    Eyes: Negative for blurred vision and double vision.   Respiratory: Positive for shortness of breath. Negative for cough and hemoptysis.    Cardiovascular: Negative for chest pain and palpitations.   Gastrointestinal: Negative for heartburn and nausea.   Genitourinary: Negative for dysuria and urgency.   Musculoskeletal: Positive for back pain and joint pain.   Skin: Negative for rash.   Neurological: Negative for dizziness.   Psychiatric/Behavioral: Negative for depression.      Physical Exam  Laboratory/Imaging   Hemodynamics  Temp (24hrs), Av.9 °C (98.5 °F), Min:36.3 °C (97.4 °F), Max:37.4 °C (99.3 °F)   Temperature: 36.9 °C (98.5 °F)  Pulse  Av.1  Min: 66  Max: 100 Heart Rate (Monitored): 78  Blood Pressure: 143/97, NIBP: 128/83      Respiratory      Respiration: 18, Pulse Oximetry: 96 %        RML Breath Sounds: Diminished, RLL Breath Sounds: Diminished, LLL Breath Sounds: Diminished    Fluids    Intake/Output Summary (Last 24 hours) at 18 1101  Last data filed at 18 0915   Gross per 24 hour   Intake             1810 ml   Output             1400 ml   Net              410 ml       Nutrition  Orders Placed This Encounter   Procedures   • Diet Order Renal     Standing Status:   Standing     Number of Occurrences:   1     Order Specific Question:   Diet:     Answer:   Renal [8]     Physical Exam   Constitutional: She is oriented to person, place, and time. No distress.   Eyes: No scleral icterus.   Cardiovascular: Normal rate, regular rhythm and normal heart sounds.    Pulmonary/Chest: She has rales.   Poor respiratory effort, bothered by pain, decreased bs in bases   Abdominal: Soft. Bowel sounds are normal. She exhibits no distension.   Musculoskeletal: She exhibits tenderness.   Left hip joint pain, poor mobility of left hip, back   Neurological: She is alert and oriented to person, place, and time.   Skin: Skin is warm and dry.   Av fistula functional    Psychiatric: She has a normal mood and affect.       Recent Labs      08/10/18   2005  08/11/18   0415   WBC  3.8*  3.3*   RBC  3.38*  3.24*   HEMOGLOBIN  10.2*  9.6*   HEMATOCRIT  32.5*  31.3*   MCV  96.2  96.6   MCH  30.2  29.6   MCHC  31.4*  30.7*   RDW  51.0*  51.5*   PLATELETCT  124*  115*   MPV  10.5  10.3     Recent Labs      08/10/18   2005  08/11/18   0415   SODIUM  141  142   POTASSIUM  4.7  4.1   CHLORIDE  104  104   CO2  25  25   GLUCOSE  81  121*   BUN  23*  27*   CREATININE  7.77*  8.40*   CALCIUM  7.7*  7.6*     Recent Labs      08/10/18   2005   APTT  26.3   INR  1.02                  Assessment/Plan     * AV fistula occlusion ([facility])   Assessment & Plan    Dr. Ardon of vascular surgery was consulted by the emergency room physician and will plan to see the patient in the morning for repair or stenting of the occlusion.  I will keep the patient n.p.o. at midnight.  Once AV fistula is functioning, then we will plan to consult nephrology for inpatient dialysis prior to discharge home.  The patient missed her usual scheduled course today.    8/11: underwent  right arm graft thrombectomy, ptfe patch angioplasty of venous anastomosis, balloon angioplasty 6x40 right arm av graft. Thereafter underwent hd for 3hrs, net UF 1000ml. Per renal rue av graft positive for bruit and thrill.     8/12: fistula with appropriate thrill             Pneumonia- (present on admission)   Assessment & Plan    Recent dx of pna  Currently on o2 3 litres  Add doxycycline 100 bid  F/u cxr tomorrow    8/12: cxr : There is bibasilar atelectasis, left greater than right, stable. Left Groshong catheter is again seen. There is no evidence of focal consolidation or evidence of pulmonary edema.  There is stable small left pleural effusion.  The heart is enlarged.    - ordered is  - complete doxy        ESRD (end stage renal disease) on dialysis ([facility])- (present on admission)   Assessment & Plan    Patient is followed by Dr. Najjar of  nephrology, we will contact his group in the morning to arrange for inpatient dialysis.        Lupus (systemic lupus erythematosus) ([facility])- (present on admission)   Assessment & Plan    Continue home Plaquenil, no acute exacerbation or flare.        Chronic pain disorder   Assessment & Plan    On chronic oxy at home  Ordered 1mg dilaudid x 1 to improve breathing effort        Depression- (present on admission)   Assessment & Plan    This is chronic and stable, continue home Wellbutrin.          Quality-Core Measures   Reviewed items::  Labs reviewed, Medications reviewed and Radiology images reviewed  Taylor catheter::  No Taylor  DVT prophylaxis - mechanical:  Not indicated at this time, ambulatory

## 2018-08-12 NOTE — PROGRESS NOTES
Hemodialysis ordered by Dr Najjar for 3hrs on 3K acid bat.  Treatment initiated at 1341 and ended at 1641.  Pt tolerated tx well. Net UF 1000ml. Please see flowsheet for details.  Report given to staff RN, CHASIDY Correia.    Pre and post tx, RUAAVG positive for bruit and thrill. Needles removed, manual pressure held for 10mins. then sites covered with clean and dry dressing, CDI.

## 2018-08-12 NOTE — PROGRESS NOTES
Received report from day shift RN. Assumed patient care  AOx4  Pain rated at 7/10, medicated per MAR  Room air  Renal diet, tolerating  AV fistula to right upper arm, bruit and thrill present   Left chest port accessed, running TKO  Ambulates SBA  Call light within reach  Bed locked and in low position\  POC discussed with patient   All needs met at this time.

## 2018-08-12 NOTE — PROGRESS NOTES
[facility] Medicine Progress Note, Adult, Complex               Author: Fadi Najjar Date & Time created: 2018  2:23 PM     Interval History:  Pt with ESRD, presented with AVG thrombosis, S/O angioplasty  Doing OK    Review of Systems:  Review of Systems   Constitutional: Negative for chills and fever.   Respiratory: Negative for cough.    Cardiovascular: Negative for chest pain and leg swelling.   Musculoskeletal: Positive for joint pain and myalgias.   Neurological: Negative for dizziness and headaches.       Physical Exam:  Physical Exam   Constitutional: She is oriented to person, place, and time. No distress.   Pulmonary/Chest: Effort normal and breath sounds normal. No respiratory distress. She has no wheezes.   Musculoskeletal: She exhibits no edema.   Neurological: She is alert and oriented to person, place, and time.   Skin: She is not diaphoretic.   Vitals reviewed.      Labs:        Invalid input(s): [id number]      Recent Labs      08/10/18   2005  08/11/18   0415   SODIUM  141  142   POTASSIUM  4.7  4.1   CHLORIDE  104  104   CO2  25  25   BUN  23*  27*   CREATININE  7.77*  8.40*   CALCIUM  7.7*  7.6*     Recent Labs      08/10/18   2005  08/11/18   0415   ALTSGPT  9   --    ASTSGOT  13   --    ALKPHOSPHAT  84   --    TBILIRUBIN  0.5   --    GLUCOSE  81  121*     Recent Labs      08/10/18   2005  08/11/18   0415   RBC  3.38*  3.24*   HEMOGLOBIN  10.2*  9.6*   HEMATOCRIT  32.5*  31.3*   PLATELETCT  124*  115*   PROTHROMBTM  13.1   --    APTT  26.3   --    INR  1.02   --      Recent Labs      08/10/18   2005  08/11/18   0415   WBC  3.8*  3.3*   NEUTSPOLYS  49.60   --    LYMPHOCYTES  38.30   --    MONOCYTES  4.30   --    EOSINOPHILS  6.90   --    BASOPHILS  0.90   --    ASTSGOT  13   --    ALTSGPT  9   --    ALKPHOSPHAT  84   --    TBILIRUBIN  0.5   --            Hemodynamics:  Temp (24hrs), Av.9 °C (98.4 °F), Min:36.3 °C (97.4 °F), Max:37.4 °C (99.3 °F)  Temperature: 36.9 °C (98.5 °F)  Pulse   Av.1  Min: 66  Max: 100   Blood Pressure: 143/97     Respiratory:    Respiration: 18, Pulse Oximetry: 96 %        RML Breath Sounds: Diminished, RLL Breath Sounds: Diminished, LLL Breath Sounds: Diminished  Fluids:    Intake/Output Summary (Last 24 hours) at 18 1423  Last data filed at 18 1200   Gross per 24 hour   Intake             2160 ml   Output             1400 ml   Net              760 ml        GI/Nutrition:  Orders Placed This Encounter   Procedures   • Diet Order Renal     Standing Status:   Standing     Number of Occurrences:   1     Order Specific Question:   Diet:     Answer:   Renal [8]     Medical Decision Making, by Problem:  Active Hospital Problems    Diagnosis   • *AV fistula occlusion ([facility]) [T82.898A]   • Pneumonia [J18.9]   • ESRD (end stage renal disease) on dialysis ([facility]) [N18.6, Z99.2]   • Lupus (systemic lupus erythematosus) ([facility]) [M32.9]   • Depression [F32.9]   • Chronic pain disorder [G89.4]       Quality-Core Measures  1 ESRD: HD MWF  2 Anemia  Plan   no need for HD today  Renal dose all meds  Avoid nephrotoxins

## 2018-08-13 VITALS
OXYGEN SATURATION: 97 % | RESPIRATION RATE: 17 BRPM | HEART RATE: 73 BPM | TEMPERATURE: 97.2 F | DIASTOLIC BLOOD PRESSURE: 97 MMHG | SYSTOLIC BLOOD PRESSURE: 153 MMHG | HEIGHT: 65 IN | WEIGHT: 182.1 LBS | BODY MASS INDEX: 30.34 KG/M2

## 2018-08-13 PROBLEM — T82.898A AV FISTULA OCCLUSION (HCC): Status: RESOLVED | Noted: 2018-08-10 | Resolved: 2018-08-13

## 2018-08-13 PROCEDURE — 700102 HCHG RX REV CODE 250 W/ 637 OVERRIDE(OP): Performed by: INTERNAL MEDICINE

## 2018-08-13 PROCEDURE — 700111 HCHG RX REV CODE 636 W/ 250 OVERRIDE (IP): Performed by: INTERNAL MEDICINE

## 2018-08-13 PROCEDURE — 99217 PR OBSERVATION CARE DISCHARGE: CPT | Performed by: INTERNAL MEDICINE

## 2018-08-13 PROCEDURE — 96376 TX/PRO/DX INJ SAME DRUG ADON: CPT

## 2018-08-13 PROCEDURE — 700102 HCHG RX REV CODE 250 W/ 637 OVERRIDE(OP): Performed by: HOSPITALIST

## 2018-08-13 PROCEDURE — G0378 HOSPITAL OBSERVATION PER HR: HCPCS

## 2018-08-13 PROCEDURE — A9270 NON-COVERED ITEM OR SERVICE: HCPCS | Performed by: INTERNAL MEDICINE

## 2018-08-13 PROCEDURE — A9270 NON-COVERED ITEM OR SERVICE: HCPCS | Performed by: HOSPITALIST

## 2018-08-13 RX ORDER — DOXYCYCLINE 100 MG/1
100 TABLET ORAL 2 TIMES DAILY
Qty: 4 TAB | Refills: 0 | Status: SHIPPED | OUTPATIENT
Start: 2018-08-13 | End: 2018-10-23

## 2018-08-13 RX ORDER — OMEPRAZOLE 20 MG/1
20 CAPSULE, DELAYED RELEASE ORAL DAILY
Qty: 30 CAP | Refills: 3 | Status: SHIPPED | OUTPATIENT
Start: 2018-08-14 | End: 2021-11-01

## 2018-08-13 RX ADMIN — HEPARIN 500 UNITS: 100 SYRINGE at 10:53

## 2018-08-13 RX ADMIN — PREGABALIN 100 MG: 25 CAPSULE ORAL at 05:03

## 2018-08-13 RX ADMIN — OMEPRAZOLE 20 MG: 20 CAPSULE, DELAYED RELEASE ORAL at 05:03

## 2018-08-13 RX ADMIN — HYDROMORPHONE HYDROCHLORIDE 1 MG: 10 INJECTION, SOLUTION INTRAMUSCULAR; INTRAVENOUS; SUBCUTANEOUS at 09:32

## 2018-08-13 RX ADMIN — PREGABALIN 100 MG: 25 CAPSULE ORAL at 11:14

## 2018-08-13 RX ADMIN — SEVELAMER CARBONATE 2400 MG: 800 TABLET, FILM COATED ORAL at 09:30

## 2018-08-13 RX ADMIN — DOXYCYCLINE 100 MG: 100 TABLET ORAL at 05:03

## 2018-08-13 RX ADMIN — Medication 2 TABLET: at 05:03

## 2018-08-13 RX ADMIN — OXYCODONE HYDROCHLORIDE 20 MG: 10 TABLET ORAL at 11:13

## 2018-08-13 RX ADMIN — BUPROPION HYDROCHLORIDE 150 MG: 150 TABLET, EXTENDED RELEASE ORAL at 05:03

## 2018-08-13 RX ADMIN — PROMETHAZINE HYDROCHLORIDE 25 MG: 25 TABLET ORAL at 08:35

## 2018-08-13 RX ADMIN — OXYCODONE HYDROCHLORIDE 20 MG: 10 TABLET ORAL at 04:02

## 2018-08-13 RX ADMIN — Medication 325 MG: at 05:03

## 2018-08-13 ASSESSMENT — PAIN SCALES - GENERAL
PAINLEVEL_OUTOF10: 9
PAINLEVEL_OUTOF10: 6
PAINLEVEL_OUTOF10: 6
PAINLEVEL_OUTOF10: 9

## 2018-08-13 NOTE — PROGRESS NOTES
Pt A&Ox4, sitting up in bed.  AV fistula to R upper arm with bruit and thrill present.   Left upper chest port running TKO, dressing CDI.   Tolerating renal diet, denies n/v. + bowel sounds, + flatus, LBM 8/10.  Saturating >90% on RA. Denies SOB. Denies cough. States some pain with deep breaths to left chest. Heat pack provided.   Pt ambulates SBA, calls appropriately for assistance.  Updated on plan of care. Safety education provided. Bed locked in low. Call light within reach. Rounding in place.     Dialysis around [id number] today. Planning for d/c after.

## 2018-08-13 NOTE — DISCHARGE PLANNING
Outpatient Dialysis Note    Confirmed patient is active at:    [facility]  [address]   [place] [address]      Schedule: Monday, Wednesday, Friday  Time: 3:30 pm    Spoke with Hafsa at facility who confirmed.    Forwarded records for review.    Dialysis Coordinator, [facility]  Kelli Anderson [phone]

## 2018-08-13 NOTE — CARE PLAN
Problem: Pain Management  Goal: Pain level will decrease to patient's comfort goal  Outcome: PROGRESSING AS EXPECTED  Pt presents with chronic pain. Continue current home regimine with positive effect. Pt reports feeling better.   RUE fistula site sore but majority of pain is generalized, back and left chest pain with inspiration.      Problem: Respiratory:  Goal: Respiratory status will improve  Outcome: PROGRESSING AS EXPECTED  Saturating >90% on RA. Pain with inspiration, hx PNA aprox 2 wks ago. Working on controlling pain today to optimize respiratory status. IS only able to pull 500-750 at this time. Encouraged frequent use.

## 2018-08-13 NOTE — PROGRESS NOTES
Received report from day shift RN. Assumed patient care  AOx4  Pain rated at 7/10, medicated per MAR  Room air  Left chest port accessed, running TKO  Right upper arm AV fistula with bruit and thrill present  Ambulates SBA  Tolerating renal diet  Call light within reach  Bed locked and in low position  POC discussed with patient  All needs met at this time.

## 2018-08-13 NOTE — PROGRESS NOTES
Discharging Patient home per physician order.  Discharged with grandmother.  Demonstrated understanding of discharge instructions, follow up appointments, home medications, prescriptions, home care for surgical wound, and nursing care instructions for AV fistula care.  Ambulating without assistance, voiding without difficulty, pain well controlled, tolerating oral medications, oxygen saturation greater than 90% on RA, tolerating diet. Educational handouts  given and discussed.  Verbalized understanding of discharge instructions and educational handouts.  All questions answered.  Patient able to state several reasons why to return to the ED or seek medical attention. Belongings with patient at time of discharge.

## 2018-08-13 NOTE — DISCHARGE SUMMARY
"Discharge Summary    CHIEF COMPLAINT ON ADMISSION  Chief Complaint   Patient presents with   • Vascular Access Problem   • Rib Pain       Reason for Admission  Port problems     Admission Date  8/10/2018    CODE STATUS  Full Code    HPI & HOSPITAL COURSE  This is a 37 y.o. female here with ' vascular access issues with her AV fistula.  The patient has a known history of end-stage renal disease on hemodialysis Monday Wednesday and Fridays.  She went to her usual dialysis session today and they were unable to access the fistula secondary to a clot.  She was unable to have any of her usual session completed today.  She does report continued chest wall tenderness which is likely related to a recent diagnosis of pneumonia with pleural effusions.   Patient was discharged from our hospital on July 25 after an admission for shortness of breath and chest pain with hypoxia.  Prior to that, she had recently been diagnosed with pneumonia and was on antibiotic therapy which she has completed.  She has had no fevers, chills, nausea or vomiting.  Upon assessment in the ER, the patient is clinically well-appearing with no fever and stable vital signs.  She will be admitted for specialist consultation of her malfunctioning AV fistula.\"     On 8/11/18 patient  Underwent right arm graft thrombectomy, ptfe patch angioplasty of venous anastomosis, balloon angioplasty 6x40 right arm av graft. Thereafter underwent hd for 3hrs, net UF 1000ml. Post procedure patient was reporting recent pna, using 2 litres o2. I started on doxycycline 10mg twice daily, and f/u cxr. Follow up cxr on 8/12/18 showed     There is bibasilar atelectasis, left greater than right, stable. Left Groshong catheter is again seen. There is no evidence of focal consolidation or evidence of pulmonary edema.  There is stable small left pleural effusion.  The heart is enlarged.    She was weaned off oxygen, due to chronic pain more notably in her left hip joint patient was " not breathing optimally. She was requested to use an Incentive spirometer. Currently she is on ra, stable, av fistual graft with appropriate thrill, bruit.        Therefore, she is discharged in good and stable condition to home with close outpatient follow-up.    The patient met 2-midnight criteria for an inpatient stay at the time of discharge.    Discharge Date  8/11/18    FOLLOW UP ITEMS POST DISCHARGE  Continue outpatient dialysis per renal  Resume home meds  Continue to use an incentive spirometer every hour for 10-12 times during wake hours, keep track of your readings, report it to pcp  Complete doxycycline 100mg twice daily x 2 more days  See pcp for post hospital discharge follow up, avoid long term narcotics, need to be weaned off  Lupus management per rheumatology or pcp (currently on plaquenil), do annual eye exams      DISCHARGE DIAGNOSES  Principal Problem:    AV fistula occlusion (HCC) POA: Unknown  Active Problems:    Pneumonia POA: Yes    Lupus (systemic lupus erythematosus) (HCC) POA: Yes    ESRD (end stage renal disease) on dialysis (HCC) POA: Yes    Depression POA: Yes    Chronic pain disorder POA: Unknown  Resolved Problems:    * No resolved hospital problems. *      FOLLOW UP  Future Appointments  Date Time Provider Department Center   8/28/2018 9:30 AM Quinn Chandler M.D. [facility] None     No follow-up provider specified.    MEDICATIONS ON DISCHARGE     Medication List      START taking these medications      Instructions   doxycycline monohydrate 100 MG tablet  Commonly known as:  ADOXA   Take 1 Tab by mouth 2 times a day.  Dose:  100 mg     omeprazole 20 MG delayed-release capsule  Commonly known as:  PRILOSEC   Take 1 Cap by mouth every day.  Dose:  20 mg        CONTINUE taking these medications      Instructions   ascorbic acid 500 MG Tabs  Commonly known as:  ascorbic acid   Take 500 mg by mouth every day.  Dose:  500 mg     cholecalciferol 5000 UNIT Caps  Commonly known as:  VITAMIN D3    Take 10,000 Units by mouth every day.  Dose:  89925 Units     cinacalcet 30 MG Tabs  Commonly known as:  SENSIPAR   Take 30 mg by mouth every bedtime.  Dose:  30 mg     ferrous sulfate 325 (65 Fe) MG tablet   Take 325 mg by mouth every day.  Dose:  325 mg     hydroxychloroquine 200 MG Tabs  Commonly known as:  PLAQUENIL   Take 200 mg by mouth every bedtime.  Dose:  200 mg     lidocaine 5 % Ptch  Commonly known as:  LIDODERM   Apply 1 Patch to skin as directed every 24 hours. Apply to back  Dose:  1 Patch     Oxycodone HCl 20 MG Tabs   Take 1 Tab by mouth every 6 hours as needed (Take up to three a day as needed and one additional pill after dialysis (3 times a week)) for up to 30 days.  Dose:  20 mg     pregabalin 100 MG Caps  Commonly known as:  LYRICA   Take 100 mg by mouth 3 times a day.  Dose:  100 mg     promethazine 25 MG Tabs  Commonly known as:  PHENERGAN   Take 25 mg by mouth every 6 hours as needed for Nausea/Vomiting.  Dose:  25 mg     RENVELA 800 MG Tabs tablet  Generic drug:  sevelamer carbonate   Take 2,400 mg by mouth 3 times a day, with meals.  Dose:  2400 mg     tizanidine 4 MG Tabs  Commonly known as:  ZANAFLEX   Take 2 Tabs by mouth every bedtime.  Dose:  8 mg     traZODone 50 MG Tabs  Commonly known as:  DESYREL   Take 1 Tab by mouth every bedtime.  Dose:  50 mg     WELLBUTRIN  MG XL tablet  Generic drug:  buPROPion   Take 150 mg by mouth every morning.  Dose:  150 mg            Allergies  Allergies   Allergen Reactions   • Lorazepam [Ativan] Anxiety     Patient becomes severely paranoid and agitated   • Morphine Itching     Tolerates Dilaudid   • Seasonal Runny Nose and Itching     Hay fever, sabiha brush       DIET  Orders Placed This Encounter   Procedures   • Diet Order Renal     Standing Status:   Standing     Number of Occurrences:   1     Order Specific Question:   Diet:     Answer:   Renal [8]       ACTIVITY  As tolerated.  Weight bearing as tolerated    CONSULTATIONS  Renal, Vascular  surgery    PROCEDURES  On 8/11/18 by     1.  Right arm graft thrombectomy.  2.  PTFE patch angioplasty of venous anastomosis.  3.  Balloon angioplasty, 6x40 mm right arm AV graft.    LABORATORY  Lab Results   Component Value Date    SODIUM 142 08/11/2018    POTASSIUM 4.1 08/11/2018    CHLORIDE 104 08/11/2018    CO2 25 08/11/2018    GLUCOSE 121 (H) 08/11/2018    BUN 27 (H) 08/11/2018    CREATININE 8.40 (HH) 08/11/2018    CREATININE 0.9 12/13/2008        Lab Results   Component Value Date    WBC 3.3 (L) 08/11/2018    HEMOGLOBIN 9.6 (L) 08/11/2018    HEMATOCRIT 31.3 (L) 08/11/2018    PLATELETCT 115 (L) 08/11/2018        Total time of the discharge process exceeds 40 minutes.

## 2018-08-13 NOTE — DISCHARGE INSTRUCTIONS
Discharge Instructions    Discharged to home by car with relative. Discharged via wheelchair, hospital escort: Yes.  Special equipment needed: Not Applicable    Be sure to schedule a follow-up appointment with your primary care doctor or any specialists as instructed.     Discharge Plan:   Diet Plan: Discussed  Activity Level: Discussed  Smoking Cessation Offered: Patient Refused  Confirmed Follow up Appointment: Patient to Call and Schedule Appointment  Confirmed Symptoms Management: Discussed  Medication Reconciliation Updated: Yes  Influenza Vaccine Indication: Patient Refuses    I understand that a diet low in cholesterol, fat, and sodium is recommended for good health. Unless I have been given specific instructions below for another diet, I accept this instruction as my diet prescription.   Other diet: renal    Special Instructions: None    · Is patient discharged on Warfarin / Coumadin?   No     Depression / Suicide Risk    As you are discharged from this [facility] facility, it is important to learn how to keep safe from harming yourself.    Recognize the warning signs:  · Abrupt changes in personality, positive or negative- including increase in energy   · Giving away possessions  · Change in eating patterns- significant weight changes-  positive or negative  · Change in sleeping patterns- unable to sleep or sleeping all the time   · Unwillingness or inability to communicate  · Depression  · Unusual sadness, discouragement and loneliness  · Talk of wanting to die  · Neglect of personal appearance   · Rebelliousness- reckless behavior  · Withdrawal from people/activities they love  · Confusion- inability to concentrate     If you or a loved one observes any of these behaviors or has concerns about self-harm, here's what you can do:  · Talk about it- your feelings and reasons for harming yourself  · Remove any means that you might use to hurt yourself (examples: pills, rope, extension cords, firearm)  · Get  "professional help from the community (Mental Health, Substance Abuse, psychological counseling)  · Do not be alone:Call your Safe Contact- someone whom you trust who will be there for you.  · Call your local CRISIS HOTLINE 418-0325 or 580-570-7880  · Call your local Children's Mobile Crisis Response Team [place] (272) 316-2528 or www.Yassets  · Call the toll free National Suicide Prevention Hotlines   · National Suicide Prevention Lifeline 372-746-WMDR (1255)  · [employer] Line Network 800-SUICIDE (658-5543)      Continue outpatient dialysis per renal   Resume home meds   Continue to use an incentive spirometer every hour for 10-12 times during wake hours, keep track of your readings, report it to pcp   Complete doxycycline 100mg twice daily x 2 more days   See pcp for post hospital discharge follow up, avoid long term narcotics, need to be weaned off   Lupus management per rheumatology or pcp (currently on plaquenil), do annual eye exams       AV Fistula, Care After  Refer to this sheet in the next few weeks. These instructions provide you with information on caring for yourself after your procedure. Your caregiver may also give you more specific instructions. Your treatment has been planned according to current medical practices, but problems sometimes occur. Call your caregiver if you have any problems or questions after your procedure.  HOME CARE INSTRUCTIONS   · Do not drive a car or take public transportation alone.  · Do not drink alcohol.  · Only take medicine that has been prescribed by your caregiver.  · Do not sign important papers or make important decisions.  · Have a responsible person with you.  · Ask your caregiver to show you how to check your access at home for a vibration (called a \"thrill\") or for a sound (called a \"bruit\" pronounced brew-ee).  · Your vein will need time to enlarge and mature so needles can be inserted for dialysis. Follow your caregiver's instructions about " what you need to do to make this happen.  · Keep dressings clean and dry.  · Keep the arm elevated above your heart. Use a pillow.  · Rest.  · Use the arm as usual for all activities.  · Have the stitches or tape closures removed in 10 to 14 days, or as directed by your caregiver.  · Do not sleep or lie on the area of the fistula or that arm. This may decrease or stop the blood flow through your fistula.  · Do not allow blood pressures to be taken on this arm.  · Do not allow blood drawing to be done from the graft.  · Do not wear tight clothing around the access site or on the arm.  · Avoid lifting heavy objects with the arm that has the fistula.  · Do not use creams or lotions over the access site.  SEEK MEDICAL CARE IF:   · You have a fever.  · You have swelling around the fistula that gets worse, or you have new pain.  · You have unusual bleeding at the fistula site or from any other area.  · You have pus or other drainage at the fistula site.  · You have skin redness or red streaking on the skin around, above, or below the fistula site.  · Your access site feels warm.  · You have any flu-like symptoms.  SEEK IMMEDIATE MEDICAL CARE IF:   · You have pain, numbness, or an unusual pale skin on the hand or on the side of your fistula.  · You have dizziness or weakness that you have not had before.  · You have shortness of breath.  · You have chest pain.  · Your fistula disconnects or breaks, and there is bleeding that cannot be easily controlled.  Call for local emergency medical help. Do not try to drive yourself to the hospital.  MAKE SURE YOU  · Understand these instructions.  · Will watch your condition.  · Will get help right away if you are not doing well or get worse.     This information is not intended to replace advice given to you by your health care provider. Make sure you discuss any questions you have with your health care provider.     Document Released: 12/18/2006 Document Revised: 01/08/2016  Document Reviewed: 06/06/2012  [employer] Interactive Patient Education ©2016 [employer]

## 2018-08-13 NOTE — CARE PLAN
Problem: Safety  Goal: Will remain free from falls  Outcome: PROGRESSING AS EXPECTED  Ambulates steady with SBA    Problem: Respiratory:  Goal: Respiratory status will improve  Outcome: PROGRESSING AS EXPECTED  Saturating above 90% on room air, encourage patient to use IS

## 2018-08-13 NOTE — PROGRESS NOTES
This RN found a home medication slip in pt's chart after pt has already discharged.  Called and left message for pt. Instructed pt to give this RN a call back and provided pt with appropriate phone number.   Pt to call back regarding  of home medications.

## 2018-08-14 ENCOUNTER — PATIENT OUTREACH (OUTPATIENT)
Dept: HEALTH INFORMATION MANAGEMENT | Facility: OTHER | Age: 38
End: 2018-08-14

## 2018-08-14 NOTE — PROGRESS NOTES
8/14/18 2:55 pm:  CM post discharge outreach call first attempt.  VM left requesting return call to [phone].    8/15/18 9:30am:  CM post discharge outreach call second attempt.  JAMIE left requesting return call to  at [phone].

## 2018-08-28 ENCOUNTER — PATIENT OUTREACH (OUTPATIENT)
Dept: HEALTH INFORMATION MANAGEMENT | Facility: OTHER | Age: 38
End: 2018-08-28

## 2018-08-28 ENCOUNTER — OFFICE VISIT (OUTPATIENT)
Dept: PHYSICAL MEDICINE AND REHAB | Facility: MEDICAL CENTER | Age: 38
End: 2018-08-28
Payer: MEDICARE

## 2018-08-28 VITALS
TEMPERATURE: 98.6 F | BODY MASS INDEX: 30.12 KG/M2 | SYSTOLIC BLOOD PRESSURE: 140 MMHG | HEIGHT: 65 IN | WEIGHT: 180.78 LBS | DIASTOLIC BLOOD PRESSURE: 98 MMHG | HEART RATE: 87 BPM | OXYGEN SATURATION: 95 %

## 2018-08-28 DIAGNOSIS — M79.7 FIBROMYALGIA: ICD-10-CM

## 2018-08-28 DIAGNOSIS — F11.90 CHRONIC, CONTINUOUS USE OF OPIOIDS: ICD-10-CM

## 2018-08-28 DIAGNOSIS — G62.9 PERIPHERAL POLYNEUROPATHY: ICD-10-CM

## 2018-08-28 DIAGNOSIS — M87.051 AVASCULAR NECROSIS OF BONES OF BOTH HIPS (HCC): ICD-10-CM

## 2018-08-28 DIAGNOSIS — M87.052 AVASCULAR NECROSIS OF BONES OF BOTH HIPS (HCC): ICD-10-CM

## 2018-08-28 DIAGNOSIS — M99.08 RIB CAGE REGION SOMATIC DYSFUNCTION: ICD-10-CM

## 2018-08-28 DIAGNOSIS — M54.50 ACUTE BILATERAL LOW BACK PAIN WITHOUT SCIATICA: ICD-10-CM

## 2018-08-28 PROCEDURE — 99214 OFFICE O/P EST MOD 30 MIN: CPT | Performed by: PHYSICAL MEDICINE & REHABILITATION

## 2018-08-28 RX ORDER — OXYCODONE HYDROCHLORIDE 20 MG/1
20 TABLET ORAL EVERY 6 HOURS PRN
Qty: 102 TAB | Refills: 0 | Status: SHIPPED | OUTPATIENT
Start: 2018-08-30 | End: 2018-09-25 | Stop reason: SDUPTHER

## 2018-08-28 ASSESSMENT — PAIN SCALES - GENERAL: PAINLEVEL: 7=MODERATE-SEVERE PAIN

## 2018-09-06 NOTE — PROGRESS NOTES
Patient Debby Murray was discharge on 07/28 pleural effusion. [facility] Patient Advocate assisted with multiple discharge orders including confirming   2- Outpatient Physical therapy follow up appointments, patient kept  both of these  two appointments. Patient did not follow discharge orders instructions to follow up with Primary Care Physician upon discharge. [facility]  made multiple outreach attempts to try to get in contact with the patient since discharge and was not successful.  The patient has  1 future Physical therapy follow up  appointment scheduled.

## 2018-09-25 ENCOUNTER — OFFICE VISIT (OUTPATIENT)
Dept: PHYSICAL MEDICINE AND REHAB | Facility: MEDICAL CENTER | Age: 38
End: 2018-09-25
Payer: MEDICARE

## 2018-09-25 VITALS
SYSTOLIC BLOOD PRESSURE: 120 MMHG | HEIGHT: 65 IN | TEMPERATURE: 97.6 F | DIASTOLIC BLOOD PRESSURE: 98 MMHG | HEART RATE: 94 BPM | BODY MASS INDEX: 30.45 KG/M2 | OXYGEN SATURATION: 91 % | WEIGHT: 182.76 LBS

## 2018-09-25 DIAGNOSIS — M87.052 AVASCULAR NECROSIS OF BONES OF BOTH HIPS (HCC): ICD-10-CM

## 2018-09-25 DIAGNOSIS — M79.10 MYALGIA: ICD-10-CM

## 2018-09-25 DIAGNOSIS — M99.08 RIB CAGE REGION SOMATIC DYSFUNCTION: ICD-10-CM

## 2018-09-25 DIAGNOSIS — N18.6 ESRD (END STAGE RENAL DISEASE) ON DIALYSIS (HCC): ICD-10-CM

## 2018-09-25 DIAGNOSIS — M62.838 MUSCLE SPASM: ICD-10-CM

## 2018-09-25 DIAGNOSIS — Z99.2 ESRD (END STAGE RENAL DISEASE) ON DIALYSIS (HCC): ICD-10-CM

## 2018-09-25 DIAGNOSIS — M87.051 AVASCULAR NECROSIS OF BONES OF BOTH HIPS (HCC): ICD-10-CM

## 2018-09-25 DIAGNOSIS — M54.6 THORACIC SPINE PAIN: ICD-10-CM

## 2018-09-25 DIAGNOSIS — M54.50 CHRONIC BILATERAL LOW BACK PAIN WITHOUT SCIATICA: ICD-10-CM

## 2018-09-25 DIAGNOSIS — G89.29 CHRONIC BILATERAL LOW BACK PAIN WITHOUT SCIATICA: ICD-10-CM

## 2018-09-25 DIAGNOSIS — M79.7 FIBROMYALGIA: ICD-10-CM

## 2018-09-25 DIAGNOSIS — G62.9 PERIPHERAL POLYNEUROPATHY: ICD-10-CM

## 2018-09-25 PROCEDURE — 99214 OFFICE O/P EST MOD 30 MIN: CPT | Performed by: PHYSICAL MEDICINE & REHABILITATION

## 2018-09-25 RX ORDER — OXYCODONE HYDROCHLORIDE 20 MG/1
20 TABLET ORAL EVERY 6 HOURS PRN
Qty: 96 TAB | Refills: 0 | Status: SHIPPED | OUTPATIENT
Start: 2018-09-25 | End: 2018-10-23 | Stop reason: SDUPTHER

## 2018-09-25 ASSESSMENT — PAIN SCALES - GENERAL: PAINLEVEL: 8=MODERATE-SEVERE PAIN

## 2018-09-25 NOTE — PROGRESS NOTES
Follow up patient note  Pain Medicine, Interventional spine and sports physiatry, Physical medicine rehabilitation      Chief complaint:   Cervicalgia, hips and knees, bilateral leg pain      HISTORY      HPI  Patient identification/Interval histotry: Debby Carrizales 37 y.o. female who has chronic pain related to rheumatologic disease, peripheral neuropathy and AVN hips, lupus.      She reports that she has been having burning mid-back pain that is bothering her quite a lot.  She had to leave diaylsis and could not tolerate.  This is often painful, but she was not able to yesterday.  Using music helps, she tries to sleep, using a cushion.  She reports that she has started physical therapy, but I don't have that evaluation.  She does report that she has been doing stretching exercises.  The therapist is working on alignment and exercises with a foam roller.    Otherwise, she has been having similar pains in the aching pain in her hips, numbness and tingling in the legs and hands with some burning pain.      She continues lyrica 100mg po tid without side effects.  No side effects from oxycodone.    No side effects from medications, mild constipation is managed with colace.  Regular bowel movements.         ROS Red Flags :   Chills, Sweats:No fevers, but she does report some chills and night sweats.  Involuntary Weight Loss: Denies  Bowel/Bladder Incontinence: Denies  Saddle Anesthesia: Denies    PMHx:   Past Medical History:   Diagnosis Date   • Arthritis     all joints,r/t lupus   • Avascular necrosis of bones of both hips (HCC) 10/10/2016   • Clostridium difficile colitis 5/3/2011   • Dialysis patient    • ESBL (extended spectrum beta-lactamase) producing bacteria infection 8/25/2014   • Fibromyalgia    • Hypertension    • Lupus    • Pneumonia    • Psychiatric disorder     anxiety, depression   • Pyelonephritis 11/21/2017   • Renal failure        PSHx:   Past Surgical History:   Procedure Laterality  Date   • AV FISTULA REVISION Right 8/11/2018    Procedure: AV FISTULA REVISION- Graft and Thrombectomy;  Surgeon: Shabbir Ardon M.D.;  Location: [facility];  Service: General   • AV FISTULA THROMBOLYSIS Right 8/11/2018    Procedure: AV FISTULA THROMBOLYSIS;  Surgeon: Shabbir Ardon M.D.;  Location: [facility];  Service: General   • AV FISTULA CREATION Right 12/9/2017    Procedure: AV FISTULA CREATION-ARM FOR GRAFT;  Surgeon: Lesly Jansen M.D.;  Location: [facility];  Service: General   • THROMBECTOMY  12/9/2017    Procedure: THROMBECTOMY;  Surgeon: Lesly Jansen M.D.;  Location: [facility];  Service: General   • THROMBECTOMY Right 8/21/2016    Procedure: THROMBECTOMY - right AV fistula graft with grams;  Surgeon: Shabbir Ardon M.D.;  Location: [facility];  Service:    • ANGIOPLASTY BALLOON  8/21/2016    Procedure: ANGIOPLASTY BALLOON;  Surgeon: Shabbir Ardon M.D.;  Location: [facility];  Service:    • THROMBECTOMY Right 8/20/2016    Procedure: THROMBECTOMY AV GRAFT;  Surgeon: Shabbir Ardon M.D.;  Location: [facility];  Service:    • AV FISTULA CREATION Right 7/12/2016    Procedure: AV FISTULA CREATION WITH GRAFT BRACHIAL AXILLARY;  Surgeon: Shabbir Ardon M.D.;  Location: [facility];  Service:    • CLOSED REDUCTION Right 7/5/2016    Procedure: CLOSED REDUCTION- Hip ;  Surgeon: Michael Holman M.D.;  Location: [facility];  Service:    • HIP ARTHROPLASTY TOTAL Right 1/18/2016    Procedure: HIP ARTHROPLASTY TOTAL;  Surgeon: Michael Holman M.D.;  Location: [facility];  Service:    • AV FISTULA CREATION  2/3/2015    Performed by Shabbir Ardon M.D. at [facility]   • AV FISTULA CREATION  11/14/2014    Performed by Shabbir Ardon M.D. at [facility]   • AV FISTULA CREATION  9/9/2014    Performed by Shabbir Ardon M.D. at SURGERY  [facility]   • CATH PLACEMENT  9/9/2014    Performed by Shabbir Ardon M.D. at SURGERY [facility]   • OTHER  5/2011    tracheostomy   • GASTROSCOPY-ENDO  4/27/2011    Performed by PALMIRA DOAN at ENDOSCOPY [facility]   • COLONOSCOPY WITH BIOPSY  4/20/2011    Performed by ALCIRA CRUZ at ENDOSCOPY [facility]   • GASTROSCOPY-ENDO  4/18/2011    Performed by ALCIRA CRUZ at ENDOSCOPY [facility]   • GASTROSCOPY-ENDO  4/10/2011    Performed by SYED JURADO at ENDOSCOPY [facility]   • SCLEROTHERAPHY  4/10/2011    Performed by SYED JURADO at ENDOSCOPY [facility]   • OTHER ABDOMINAL SURGERY      kidney biopsy   • TRACHEOSTOMY         Family history   Denies neuromuscular disease  Family History   Problem Relation Age of Onset   • Heart Disease Mother    • Cancer Father        Medications:   Current Outpatient Prescriptions   Medication   • Oxycodone HCl 20 MG Tab   • doxycycline monohydrate (ADOXA) 100 MG tablet   • omeprazole (PRILOSEC) 20 MG delayed-release capsule   • pregabalin (LYRICA) 100 MG Cap   • promethazine (PHENERGAN) 25 MG Tab   • cinacalcet (SENSIPAR) 30 MG Tab   • sevelamer carbonate (RENVELA) 800 MG Tab tablet   • lidocaine (LIDODERM) 5 % Patch   • ferrous sulfate 325 (65 FE) MG tablet   • ascorbic acid (ASCORBIC ACID) 500 MG Tab   • buPROPion (WELLBUTRIN XL) 150 MG XL tablet   • hydroxychloroquine (PLAQUENIL) 200 MG Tab   • cholecalciferol (VITAMIN D3) 5000 UNIT Cap   • trazodone (DESYREL) 50 MG Tab   • tizanidine (ZANAFLEX) 4 MG Tab     No current facility-administered medications for this visit.        Allergies:   Allergies   Allergen Reactions   • Lorazepam [Ativan] Anxiety     Patient becomes severely paranoid and agitated   • Morphine Itching     Tolerates Dilaudid   • Seasonal Runny Nose and Itching     Hay fever, sabiha brush       Social Hx:   Social History     Social History   • Marital status: Single     Spouse name: N/A   •  "Number of children: N/A   • Years of education: N/A     Occupational History   • Not on file.     Social History Main Topics   • Smoking status: Former Smoker     Packs/day: 0.50     Years: 18.00     Types: Cigarettes     Quit date: 6/13/2011   • Smokeless tobacco: Never Used      Comment: 1/2 ppd   • Alcohol use No   • Drug use: No   • Sexual activity: Not on file     Other Topics Concern   •  Service No   • Blood Transfusions Yes   • Caffeine Concern No   • Occupational Exposure No   • Hobby Hazards No   • Sleep Concern Yes   • Stress Concern Yes   • Weight Concern Yes   • Special Diet Yes   • Back Care No   • Exercise Yes   • Bike Helmet No   • Seat Belt Yes   • Self-Exams Yes     Social History Narrative   • No narrative on file       EXAMINATION     Physical Exam:   Vitals: Blood pressure 120/98, pulse 94, temperature 36.4 °C (97.6 °F), temperature source Temporal, height 1.651 m (5' 5\"), weight 82.9 kg (182 lb 12.2 oz), SpO2 91 %, not currently breastfeeding.    Constitutional:   Body Habitus: Body mass index is 30.41 kg/m².  Cooperation: Fully cooperates with exam  Appearance: Well-groomed no disheveled, in no acute distress,   Respiratory-  breathing comfortable on room air, no wheezing.  Cardiovascular- No pitting edema noted in the lower extremities bilaterally  Psychiatric- alert and oriented ×3. Normal affect.   Spine: Tenderness to palpation over the lower thoracic rib cage on the left with note of tender points in the thoracic paraspinals.  Slightly decreased motion with deep inspiration compared with the right.  Gait steady without loss of balance.      MEDICAL DECISION MAKING    DATA    Labs:     06/09/2018 TSH normal    BMP 08/11/2018  BUN 27, Cr 8.4, Ca 7.6, Glucose 121, Sodium 142, K+ 4.1, Cl 104, CO2 25    CBC 3.3, Hb 9.6, Hct 31.3, Plt 115    Lab Results   Component Value Date/Time    HBA1C 4.9 06/07/2018 02:29 AM          Imaging:   I reviewed the following radiology reports previously " reviewed       Chest xray 06/07/2018: Minimal right-sided opacities as described above, suggesting pneumonia.               Results for orders placed during the hospital encounter of 07/19/17   MR-LUMBAR SPINE-W/O    Impression 1.  Diffuse decreased bone marrow signal intensity throughout the lumbar spine and sacrum, presumably secondary to chronic anemia.    2.  Otherwise unremarkable MRI scan of the lumbar spine without contrast.                                    DIAGNOSIS   Visit Diagnoses     ICD-10-CM   1. Chronic bilateral low back pain without sciatica M54.5    G89.29   2. Avascular necrosis of bones of both hips ([facility]) M87.051    M87.052   3. Rib cage region somatic dysfunction M99.08   4. ESRD (end stage renal disease) on dialysis ([facility]) N18.6    Z99.2   5. Thoracic spine pain M54.6   6. Peripheral polyneuropathy ([facility]) G62.9   7. Fibromyalgia M79.7   8. Muscle spasm M62.838   9. Myalgia M79.1         ASSESSMENT and PLAN:     Debby Carrizales 37 y.o. Female returns for follow-up of her chronic pain related to lupus, AVN hips, low back and peripheral neuropathy    Debby was seen today for follow-up.    Diagnoses and all orders for this visit:    Chronic bilateral low back pain without sciatica  -     Oxycodone HCl 20 MG Tab; Take 1 Tab by mouth every 6 hours as needed (Take up to three a day as needed and one additional pill after dialysis (3 times a week)) for up to 28 days.    Avascular necrosis of bones of both hips (HCC)  -     Oxycodone HCl 20 MG Tab; Take 1 Tab by mouth every 6 hours as needed (Take up to three a day as needed and one additional pill after dialysis (3 times a week)) for up to 28 days.    Rib cage region somatic dysfunction    ESRD (end stage renal disease) on dialysis ([facility])    Thoracic spine pain  -     REFERRAL TO PHYSIATRY (PMR)    Peripheral polyneuropathy ([facility])    Fibromyalgia  -     Oxycodone HCl 20 MG Tab; Take 1 Tab by mouth every 6 hours as needed (Take up to three a day  "as needed and one additional pill after dialysis (3 times a week)) for up to 28 days.    Muscle spasm  -     REFERRAL TO PHYSIATRY (PMR)    Myalgia  -     REFERRAL TO PHYSIATRY (PMR)       Discussed that we will trial trigger point injections.  I have asked that she have her physical therapist sent notes.    Change script to every 28 days.  She has difficulty with arranging transportation and it will be helpful to have it coincide with her appointments.    In prescribing controlled substances to this patient, I certify that I have obtained and reviewed the medical history of Debby Carrizales. I have also made a good ly effort to obtain applicable records from other providers who have treated the patient and records demonstrating the following: report of \"drug-seeking behavior,\" although I suspect that she has organic reasons for pain and will continue to monitor as appropriate..     I have conducted a physical exam and documented it. I have reviewed Ms. Carrizales’s prescription history as maintained by the [place] Prescription Monitoring Program.     I have assessed the patient’s risk for abuse, dependency, and addiction using the validated Opioid Risk Tool available at https://www.mdcalc.com/gtyxsh-wada-doyi-ort-narcotic-abuse.     Given the above, I believe the benefits of controlled substance therapy outweigh the risks. The reasons for prescribing controlled substances include non-narcotic, oral analgesic alternatives have been inadequate for pain control and in my professional opinion, controlled substances are the only reasonable choice for this patient because she has limitations to medication choices due to being on dialysis.  . Accordingly, I have discussed the risk and benefits, treatment plan, and alternative therapies with the patient.       Follow up: 1 month    Thank you for allowing me to participate in the care of this patient. If you have any questions please not hesitate to contact " me.      Please note that this dictation was created using voice recognition software. I have made every reasonable attempt to correct obvious errors but there may be errors of grammar and content that I may have overlooked prior to finalization of this note.      Quinn Chandler MD  Interventional Spine and Sports Physiatry  Physical Medicine and Rehabilitation  [facility]

## 2018-10-10 ENCOUNTER — APPOINTMENT (OUTPATIENT)
Dept: PHYSICAL THERAPY | Facility: MEDICAL CENTER | Age: 38
End: 2018-10-10
Payer: MEDICARE

## 2018-10-15 ENCOUNTER — APPOINTMENT (OUTPATIENT)
Dept: PHYSICAL THERAPY | Facility: MEDICAL CENTER | Age: 38
End: 2018-10-15
Payer: MEDICARE

## 2018-10-17 ENCOUNTER — APPOINTMENT (OUTPATIENT)
Dept: PHYSICAL THERAPY | Facility: MEDICAL CENTER | Age: 38
End: 2018-10-17
Payer: MEDICARE

## 2018-10-23 ENCOUNTER — OFFICE VISIT (OUTPATIENT)
Dept: PHYSICAL MEDICINE AND REHAB | Facility: MEDICAL CENTER | Age: 38
End: 2018-10-23
Payer: MEDICARE

## 2018-10-23 VITALS
SYSTOLIC BLOOD PRESSURE: 118 MMHG | HEIGHT: 65 IN | DIASTOLIC BLOOD PRESSURE: 82 MMHG | BODY MASS INDEX: 31.4 KG/M2 | TEMPERATURE: 99 F | HEART RATE: 88 BPM | OXYGEN SATURATION: 93 % | WEIGHT: 188.49 LBS

## 2018-10-23 DIAGNOSIS — M87.052 AVASCULAR NECROSIS OF BONES OF BOTH HIPS (HCC): ICD-10-CM

## 2018-10-23 DIAGNOSIS — M79.7 FIBROMYALGIA: ICD-10-CM

## 2018-10-23 DIAGNOSIS — G89.29 CHRONIC BILATERAL LOW BACK PAIN WITHOUT SCIATICA: ICD-10-CM

## 2018-10-23 DIAGNOSIS — K59.03 THERAPEUTIC OPIOID INDUCED CONSTIPATION: ICD-10-CM

## 2018-10-23 DIAGNOSIS — M87.051 AVASCULAR NECROSIS OF BONES OF BOTH HIPS (HCC): ICD-10-CM

## 2018-10-23 DIAGNOSIS — F11.90 CHRONIC, CONTINUOUS USE OF OPIOIDS: ICD-10-CM

## 2018-10-23 DIAGNOSIS — Z99.2 ESRD (END STAGE RENAL DISEASE) ON DIALYSIS (HCC): ICD-10-CM

## 2018-10-23 DIAGNOSIS — M62.838 MUSCLE SPASM: ICD-10-CM

## 2018-10-23 DIAGNOSIS — T40.2X5A THERAPEUTIC OPIOID INDUCED CONSTIPATION: ICD-10-CM

## 2018-10-23 DIAGNOSIS — M79.10 MYALGIA: ICD-10-CM

## 2018-10-23 DIAGNOSIS — M54.50 CHRONIC BILATERAL LOW BACK PAIN WITHOUT SCIATICA: ICD-10-CM

## 2018-10-23 DIAGNOSIS — N18.6 ESRD (END STAGE RENAL DISEASE) ON DIALYSIS (HCC): ICD-10-CM

## 2018-10-23 PROCEDURE — 99214 OFFICE O/P EST MOD 30 MIN: CPT | Performed by: PHYSICAL MEDICINE & REHABILITATION

## 2018-10-23 RX ORDER — OXYCODONE HYDROCHLORIDE 20 MG/1
20 TABLET ORAL EVERY 6 HOURS PRN
Qty: 96 TAB | Refills: 0 | Status: SHIPPED | OUTPATIENT
Start: 2018-10-23 | End: 2018-11-20 | Stop reason: SDUPTHER

## 2018-10-23 RX ORDER — DOCUSATE SODIUM 100 MG/1
100 CAPSULE, LIQUID FILLED ORAL 2 TIMES DAILY
Qty: 60 CAP | Refills: 2 | Status: SHIPPED | OUTPATIENT
Start: 2018-10-23 | End: 2018-11-22

## 2018-10-23 ASSESSMENT — PAIN SCALES - GENERAL: PAINLEVEL: 8=MODERATE-SEVERE PAIN

## 2018-10-23 NOTE — PROGRESS NOTES
Follow up patient note  Pain Medicine, Interventional spine and sports physiatry, Physical medicine rehabilitation      Chief complaint:   Cervicalgia, hips and knees, bilateral leg pain      HISTORY      HPI  Patient identification/Interval histotry: Debby Carrizales 37 y.o. female who has chronic pain related to rheumatologic disease, peripheral neuropathy and AVN hips, lupus.      Overall, she feels like she has been doing better tolerating dialysis.  She continues to use her relaxation strategies: using music, cushion.    Physical therapy has started, but she has not been able to go as she has been started on valtrex for shingles and has not been able to go to PT due to this.  She had similar pain and shingles in this location about 10 years.  Sleep has been okay.    Otherwise, she has been having similar pains in the aching pain in her hips, numbness and tingling in the legs and hands with some burning pain.      She continues lyrica 100mg po tid without side effects.  No side effects from oxycodone.  We discussed the UDS that showed a very small amount of norfentanyl.  She does not know how this could have been in her urine.    No side effects from medications, mild constipation is managed with colace.  Regular bowel movements.         ROS   Unchanged except for the development of shingles.  Last visit 09/25/2018  Red Flags :   Chills, Sweats:No fevers, but she does report some chills and night sweats.  Involuntary Weight Loss: Denies  Bowel/Bladder Incontinence: Denies  Saddle Anesthesia: Denies    PMHx:   Past Medical History:   Diagnosis Date   • Arthritis     all joints,r/t lupus   • Avascular necrosis of bones of both hips (HCC) 10/10/2016   • Clostridium difficile colitis 5/3/2011   • Dialysis patient    • ESBL (extended spectrum beta-lactamase) producing bacteria infection 8/25/2014   • Fibromyalgia    • Hypertension    • Lupus    • Pneumonia    • Psychiatric disorder     anxiety,  depression   • Pyelonephritis 11/21/2017   • Renal failure        PSHx:   Past Surgical History:   Procedure Laterality Date   • AV FISTULA REVISION Right 8/11/2018    Procedure: AV FISTULA REVISION- Graft and Thrombectomy;  Surgeon: Shabbir Ardon M.D.;  Location: [facility];  Service: General   • AV FISTULA THROMBOLYSIS Right 8/11/2018    Procedure: AV FISTULA THROMBOLYSIS;  Surgeon: Shabbir Ardon M.D.;  Location: [facility];  Service: General   • AV FISTULA CREATION Right 12/9/2017    Procedure: AV FISTULA CREATION-ARM FOR GRAFT;  Surgeon: Lesly Jansen M.D.;  Location: [facility];  Service: General   • THROMBECTOMY  12/9/2017    Procedure: THROMBECTOMY;  Surgeon: Lesly Jansen M.D.;  Location: [facility];  Service: General   • THROMBECTOMY Right 8/21/2016    Procedure: THROMBECTOMY - right AV fistula graft with grams;  Surgeon: Shabbir Ardon M.D.;  Location: [facility];  Service:    • ANGIOPLASTY BALLOON  8/21/2016    Procedure: ANGIOPLASTY BALLOON;  Surgeon: Shabbir Ardon M.D.;  Location: [facility];  Service:    • THROMBECTOMY Right 8/20/2016    Procedure: THROMBECTOMY AV GRAFT;  Surgeon: Shabbir Ardon M.D.;  Location: [facility];  Service:    • AV FISTULA CREATION Right 7/12/2016    Procedure: AV FISTULA CREATION WITH GRAFT BRACHIAL AXILLARY;  Surgeon: Shabbir Ardon M.D.;  Location: [facility];  Service:    • CLOSED REDUCTION Right 7/5/2016    Procedure: CLOSED REDUCTION- Hip ;  Surgeon: Michael Holman M.D.;  Location: [facility];  Service:    • HIP ARTHROPLASTY TOTAL Right 1/18/2016    Procedure: HIP ARTHROPLASTY TOTAL;  Surgeon: Michale Holman M.D.;  Location: [facility];  Service:    • AV FISTULA CREATION  2/3/2015    Performed by Shabbir Ardon M.D. at [facility]   • AV FISTULA CREATION  11/14/2014    Performed by Shabbir PRITCHARD  RAMON Ardon at SURGERY [facility]   • AV FISTULA CREATION  9/9/2014    Performed by Shabbir Ardon M.D. at SURGERY [facility]   • CATH PLACEMENT  9/9/2014    Performed by Shabbir Ardon M.D. at SURGERY [facility]   • OTHER  5/2011    tracheostomy   • GASTROSCOPY-ENDO  4/27/2011    Performed by PALMIRA DOAN at ENDOSCOPY [facility]   • COLONOSCOPY WITH BIOPSY  4/20/2011    Performed by ALCIRA CRUZ at ENDOSCOPY [facility]   • GASTROSCOPY-ENDO  4/18/2011    Performed by ALCIRA CRUZ at ENDOSCOPY [facility]   • GASTROSCOPY-ENDO  4/10/2011    Performed by SYED JURADO at ENDOSCOPY [facility]   • SCLEROTHERAPHY  4/10/2011    Performed by SYED JURADO at ENDOSCOPY [facility]   • OTHER ABDOMINAL SURGERY      kidney biopsy   • TRACHEOSTOMY         Family history   Denies neuromuscular disease  Family History   Problem Relation Age of Onset   • Heart Disease Mother    • Cancer Father        Medications:   Outpatient Prescriptions Marked as Taking for the 10/23/18 encounter (Office Visit) with Quinn Chandler M.D.   Medication Sig Dispense Refill   • docusate sodium (COLACE) 100 MG Cap Take 1 Cap by mouth 2 times a day for 30 days. 60 Cap 2   • Oxycodone HCl 20 MG Tab Take 1 Tab by mouth every 6 hours as needed (Take up to three a day as needed and one additional pill after dialysis (3 times a week)) for up to 28 days. 96 Tab 0   • pregabalin (LYRICA) 100 MG Cap Take 100 mg by mouth 3 times a day.     • cinacalcet (SENSIPAR) 30 MG Tab Take 30 mg by mouth every bedtime.     • sevelamer carbonate (RENVELA) 800 MG Tab tablet Take 2,400 mg by mouth 3 times a day, with meals.     • ferrous sulfate 325 (65 FE) MG tablet Take 325 mg by mouth every day.     • ascorbic acid (ASCORBIC ACID) 500 MG Tab Take 500 mg by mouth every day.     • buPROPion (WELLBUTRIN XL) 150 MG XL tablet Take 150 mg by mouth every morning.     • hydroxychloroquine (PLAQUENIL) 200  MG Tab Take 200 mg by mouth every bedtime.     • cholecalciferol (VITAMIN D3) 5000 UNIT Cap Take 10,000 Units by mouth every day.     • trazodone (DESYREL) 50 MG Tab Take 1 Tab by mouth every bedtime. 30 Tab 0   • tizanidine (ZANAFLEX) 4 MG Tab Take 2 Tabs by mouth every bedtime. 60 Tab 0       Current Outpatient Prescriptions   Medication   • docusate sodium (COLACE) 100 MG Cap   • Oxycodone HCl 20 MG Tab   • pregabalin (LYRICA) 100 MG Cap   • cinacalcet (SENSIPAR) 30 MG Tab   • sevelamer carbonate (RENVELA) 800 MG Tab tablet   • ferrous sulfate 325 (65 FE) MG tablet   • ascorbic acid (ASCORBIC ACID) 500 MG Tab   • buPROPion (WELLBUTRIN XL) 150 MG XL tablet   • hydroxychloroquine (PLAQUENIL) 200 MG Tab   • cholecalciferol (VITAMIN D3) 5000 UNIT Cap   • trazodone (DESYREL) 50 MG Tab   • tizanidine (ZANAFLEX) 4 MG Tab   • omeprazole (PRILOSEC) 20 MG delayed-release capsule   • promethazine (PHENERGAN) 25 MG Tab   • lidocaine (LIDODERM) 5 % Patch     No current facility-administered medications for this visit.        Allergies:   Allergies   Allergen Reactions   • Lorazepam [Ativan] Anxiety     Patient becomes severely paranoid and agitated   • Morphine Itching     Tolerates Dilaudid   • Seasonal Runny Nose and Itching     Hay fever, sabiha brush       Social Hx:   Social History     Social History   • Marital status: Single     Spouse name: N/A   • Number of children: N/A   • Years of education: N/A     Occupational History   • Not on file.     Social History Main Topics   • Smoking status: Former Smoker     Packs/day: 0.50     Years: 18.00     Types: Cigarettes     Quit date: 6/13/2011   • Smokeless tobacco: Never Used      Comment: 1/2 ppd   • Alcohol use No   • Drug use: No   • Sexual activity: Not on file     Other Topics Concern   •  Service No   • Blood Transfusions Yes   • Caffeine Concern No   • Occupational Exposure No   • Hobby Hazards No   • Sleep Concern Yes   • Stress Concern Yes   • Weight Concern  "Yes   • Special Diet Yes   • Back Care No   • Exercise Yes   • Bike Helmet No   • Seat Belt Yes   • Self-Exams Yes     Social History Narrative   • No narrative on file       EXAMINATION     Physical Exam:   Vitals: Blood pressure 118/82, pulse 88, temperature 37.2 °C (99 °F), temperature source Temporal, height 1.651 m (5' 5\"), weight 85.5 kg (188 lb 7.9 oz), SpO2 93 %, not currently breastfeeding.    Constitutional:   Body Habitus: Body mass index is 31.37 kg/m².  Cooperation: Fully cooperates with exam  Appearance: Well-groomed no disheveled, in no acute distress,   Respiratory-  breathing comfortable on room air, no wheezing.  Cardiovascular- No pitting edema noted in the lower extremities bilaterally  Psychiatric- alert and oriented ×3. Normal affect.   Spine: Tenderness to palpation over the lower thoracic rib cage on the left with note of tender points in the thoracic paraspinals.  Slightly decreased motion with deep inspiration compared with the right.  Gait steady without loss of balance.  Skin: Resolving rash left anterior chest wall.  No clear vesicles remain today    MEDICAL DECISION MAKING    DATA    Labs:     06/09/2018 TSH normal    BMP 08/11/2018  BUN 27, Cr 8.4, Ca 7.6, Glucose 121, Sodium 142, K+ 4.1, Cl 104, CO2 25    CBC 3.3, Hb 9.6, Hct 31.3, Plt 115    Lab Results   Component Value Date/Time    HBA1C 4.9 06/07/2018 02:29 AM          Imaging:   I reviewed the following radiology reports previously reviewed       Chest xray 06/07/2018: Minimal right-sided opacities as described above, suggesting pneumonia.               Results for orders placed during the hospital encounter of 07/19/17   MR-LUMBAR SPINE-W/O    Impression 1.  Diffuse decreased bone marrow signal intensity throughout the lumbar spine and sacrum, presumably secondary to chronic anemia.    2.  Otherwise unremarkable MRI scan of the lumbar spine without contrast.                                    DIAGNOSIS   Visit Diagnoses     " ICD-10-CM   1. Chronic bilateral low back pain without sciatica M54.5    G89.29   2. Avascular necrosis of bones of both hips ([facility]) M87.051    M87.052   3. ESRD (end stage renal disease) on dialysis ([facility]) N18.6    Z99.2   4. Muscle spasm M62.838   5. Myalgia M79.10   6. Therapeutic opioid induced constipation K59.03    T40.2X5A   7. Fibromyalgia M79.7   8. Chronic, continuous use of opioids F11.90         ASSESSMENT and PLAN:     Debby Carrizales 37 y.o. Female returns for follow-up of her chronic pain related to lupus, AVN hips, low back and peripheral neuropathy    Debby was seen today for follow-up.    Diagnoses and all orders for this visit:    Chronic bilateral low back pain without sciatica  -     Oxycodone HCl 20 MG Tab; Take 1 Tab by mouth every 6 hours as needed (Take up to three a day as needed and one additional pill after dialysis (3 times a week)) for up to 28 days.  -     PAIN MANAGEMENT SCRN, UR; Future    Avascular necrosis of bones of both hips ([facility])  -     Oxycodone HCl 20 MG Tab; Take 1 Tab by mouth every 6 hours as needed (Take up to three a day as needed and one additional pill after dialysis (3 times a week)) for up to 28 days.  -     PAIN MANAGEMENT SCRN, UR; Future    ESRD (end stage renal disease) on dialysis ([facility])    Muscle spasm    Myalgia    Therapeutic opioid induced constipation  -     docusate sodium (COLACE) 100 MG Cap; Take 1 Cap by mouth 2 times a day for 30 days.    Fibromyalgia  -     Oxycodone HCl 20 MG Tab; Take 1 Tab by mouth every 6 hours as needed (Take up to three a day as needed and one additional pill after dialysis (3 times a week)) for up to 28 days.    Chronic, continuous use of opioids  -     PAIN MANAGEMENT SCRN, UR; Future       Discussed that we will trial trigger point injections, we will delay this for a few weeks.  She feels like she has been recovering from shingles.  She reports that she was given valtrex, although I don't see this.    Continue with  "script to every 28 days.  She has difficulty with arranging transportation and it will be helpful to have it coincide with her appointments.    Discussed continuing oxycodone.  We will repeat UDS-sputum today as there was a very small amount of norfentanyl.  Patient denies.  Plan to repeat.    In prescribing controlled substances to this patient, I certify that I have obtained and reviewed the medical history of Debby Carrizales. I have also made a good ly effort to obtain applicable records from other providers who have treated the patient and records demonstrating the following: report of \"drug-seeking behavior,\" although I suspect that she has organic reasons for pain and will continue to monitor as appropriate.     I have conducted a physical exam and documented it. I have reviewed Ms. Carrizales’s prescription history as maintained by the [place] Prescription Monitoring Program.     I have assessed the patient’s risk for abuse, dependency, and addiction using the validated Opioid Risk Tool available at https://www.mdcalc.com/njmuil-wutv-dtsz-ort-narcotic-abuse.     Given the above, I believe the benefits of controlled substance therapy outweigh the risks. The reasons for prescribing controlled substances include non-narcotic, oral analgesic alternatives have been inadequate for pain control and in my professional opinion, controlled substances are the only reasonable choice for this patient because she has limitations to medication choices due to being on dialysis.   Accordingly, I have discussed the risk and benefits, treatment plan, and alternative therapies with the patient.       Follow up: 2 weeks    Thank you for allowing me to participate in the care of this patient. If you have any questions please not hesitate to contact me.      Please note that this dictation was created using voice recognition software. I have made every reasonable attempt to correct obvious errors but there may be errors of " grammar and content that I may have overlooked prior to finalization of this note.      Quinn Chandler MD  Interventional Spine and Sports Physiatry  Physical Medicine and Rehabilitation  [facility]

## 2018-10-24 ENCOUNTER — APPOINTMENT (OUTPATIENT)
Dept: PHYSICAL THERAPY | Facility: MEDICAL CENTER | Age: 38
End: 2018-10-24
Payer: MEDICARE

## 2018-11-09 ENCOUNTER — OFFICE VISIT (OUTPATIENT)
Dept: PHYSICAL MEDICINE AND REHAB | Facility: MEDICAL CENTER | Age: 38
End: 2018-11-09
Payer: MEDICARE

## 2018-11-09 VITALS
SYSTOLIC BLOOD PRESSURE: 110 MMHG | HEART RATE: 100 BPM | DIASTOLIC BLOOD PRESSURE: 78 MMHG | TEMPERATURE: 97.5 F | BODY MASS INDEX: 30.71 KG/M2 | WEIGHT: 184.3 LBS | OXYGEN SATURATION: 94 % | HEIGHT: 65 IN

## 2018-11-09 DIAGNOSIS — N18.6 ESRD (END STAGE RENAL DISEASE) ON DIALYSIS (HCC): ICD-10-CM

## 2018-11-09 DIAGNOSIS — M62.838 MUSCLE SPASM: ICD-10-CM

## 2018-11-09 DIAGNOSIS — M79.7 FIBROMYALGIA: ICD-10-CM

## 2018-11-09 DIAGNOSIS — M54.50 CHRONIC BILATERAL LOW BACK PAIN WITHOUT SCIATICA: ICD-10-CM

## 2018-11-09 DIAGNOSIS — Z99.2 ESRD (END STAGE RENAL DISEASE) ON DIALYSIS (HCC): ICD-10-CM

## 2018-11-09 DIAGNOSIS — M79.10 MYALGIA: ICD-10-CM

## 2018-11-09 DIAGNOSIS — G89.29 CHRONIC BILATERAL LOW BACK PAIN WITHOUT SCIATICA: ICD-10-CM

## 2018-11-09 PROCEDURE — 20553 NJX 1/MLT TRIGGER POINTS 3/>: CPT | Performed by: PHYSICAL MEDICINE & REHABILITATION

## 2018-11-09 ASSESSMENT — PAIN SCALES - GENERAL: PAINLEVEL: 6=MODERATE PAIN

## 2018-11-09 NOTE — PROGRESS NOTES
Date of Service: 11/9/2018    Patient presents for trigger point injections.  We discussed the results of her recent UDS, which again showed norfentanyl.  We discussed how this might be possible and plan to repeat UDS.    PMH/All/Meds reviewed.     Recheck UDS.  Plan to proceed with trigger point injections as below.    Trigger point injections:  Physician/s: Quinn Chandler MD    Pre-operative Diagnosis: Myalgia (M79.1), Muscle spasms (M62.838)    Post-operative Diagnosis: Myalgia (M79.1), Muscle spasms (M62.838)    Procedure: right and left trapezius and thoracic paraspinal trigger point injections    Description of procedure:    The risks, benefits, and alternatives of the procedure were reviewed and discussed with the patient.  Written informed consent was freely obtained. A pre-procedural time-out was conducted by the physician verifying patient’s identity, procedure to be performed, procedure site and side, and allergy verification. Appropriate equipment was determined to be in place for the procedure.     In the office suite exam room the patient was placed in a prone position and the skin areas for injection over the left and right trapezius and thoracic paraspinal trigger points were marked. The areas of pain was then prepped and draped in the usual sterile fashion. A 27g needle was placed into each of the markings at the areas above one in the left and right trapezius, one on the right thoracic paraspinals and three in the left thoracic paraspinals. After negative aspiration, approximately a 1 mL of 1% lidocaine was injected into the each areas above. The needle was removed intact after each trigger point injection, and the patient's back was covered with a 4x4 gauze, the area was cleansed with alcohol wipes, and a dressing was applied. There were no complications noted.     Quinn Chandler MD  Physical Medicine and Rehabilitation  Interventional Spine and Sports Physiatry  [facility]

## 2018-11-20 ENCOUNTER — OFFICE VISIT (OUTPATIENT)
Dept: PHYSICAL MEDICINE AND REHAB | Facility: MEDICAL CENTER | Age: 38
End: 2018-11-20
Payer: MEDICARE

## 2018-11-20 VITALS
BODY MASS INDEX: 31.74 KG/M2 | HEART RATE: 99 BPM | OXYGEN SATURATION: 90 % | DIASTOLIC BLOOD PRESSURE: 60 MMHG | SYSTOLIC BLOOD PRESSURE: 148 MMHG | HEIGHT: 65 IN | TEMPERATURE: 98 F | WEIGHT: 190.48 LBS

## 2018-11-20 DIAGNOSIS — T40.2X5A THERAPEUTIC OPIOID INDUCED CONSTIPATION: ICD-10-CM

## 2018-11-20 DIAGNOSIS — N18.6 ESRD (END STAGE RENAL DISEASE) ON DIALYSIS (HCC): ICD-10-CM

## 2018-11-20 DIAGNOSIS — F11.90 CHRONIC, CONTINUOUS USE OF OPIOIDS: ICD-10-CM

## 2018-11-20 DIAGNOSIS — Z99.2 ESRD (END STAGE RENAL DISEASE) ON DIALYSIS (HCC): ICD-10-CM

## 2018-11-20 DIAGNOSIS — M87.052 AVASCULAR NECROSIS OF BONES OF BOTH HIPS (HCC): ICD-10-CM

## 2018-11-20 DIAGNOSIS — M79.7 FIBROMYALGIA: ICD-10-CM

## 2018-11-20 DIAGNOSIS — K59.03 THERAPEUTIC OPIOID INDUCED CONSTIPATION: ICD-10-CM

## 2018-11-20 DIAGNOSIS — M87.051 AVASCULAR NECROSIS OF BONES OF BOTH HIPS (HCC): ICD-10-CM

## 2018-11-20 DIAGNOSIS — M54.50 CHRONIC BILATERAL LOW BACK PAIN WITHOUT SCIATICA: ICD-10-CM

## 2018-11-20 DIAGNOSIS — G89.29 CHRONIC BILATERAL LOW BACK PAIN WITHOUT SCIATICA: ICD-10-CM

## 2018-11-20 DIAGNOSIS — M62.838 MUSCLE SPASM: ICD-10-CM

## 2018-11-20 DIAGNOSIS — M79.10 MYALGIA: ICD-10-CM

## 2018-11-20 PROCEDURE — 99214 OFFICE O/P EST MOD 30 MIN: CPT | Performed by: PHYSICAL MEDICINE & REHABILITATION

## 2018-11-20 RX ORDER — OXYCODONE HYDROCHLORIDE 20 MG/1
20 TABLET ORAL EVERY 6 HOURS PRN
Qty: 96 TAB | Refills: 0 | Status: SHIPPED | OUTPATIENT
Start: 2018-11-20 | End: 2018-12-04

## 2018-11-20 RX ORDER — LACTULOSE 10 G/15ML
10 SOLUTION ORAL 2 TIMES DAILY
Qty: 1 BOTTLE | Refills: 1 | Status: SHIPPED | OUTPATIENT
Start: 2018-11-20 | End: 2018-12-04

## 2018-11-20 ASSESSMENT — PAIN SCALES - GENERAL: PAINLEVEL: 8=MODERATE-SEVERE PAIN

## 2018-11-20 NOTE — PROGRESS NOTES
Follow up patient note  Pain Medicine, Interventional spine and sports physiatry, Physical medicine rehabilitation      Chief complaint:   Cervicalgia, hips and knees, bilateral leg pain      HISTORY      HPI  Patient identification/Interval histotry: Debby Carrizales 37 y.o. female who has chronic pain related to rheumatologic disease, peripheral neuropathy and AVN hips, lupus.      Overall, she feels like she has been doing better tolerating dialysis.  She continues to use her relaxation strategies: using music, cushion and this helps.  Her medications also help.    The trigger point injections helped with her pain upper back and neck pain, but she feels like her pain is coming back.  She is wondering if we can schedule more trigger point injections.  Movement in her back was less painful.    Otherwise, she has been having similar pains in the aching pain in her hips, numbness and tingling in the legs and hands with some burning pain.      She continues lyrica 100mg po tid without side effects.  No side effects from oxycodone.      No side effects from medications, mild constipation is managed with colace.  Regular bowel movements, but she has been having more trouble with regular bowel movements despite use of colace.       ROS   Unchanged from 10/23/2018 and 11/09/2018  Red Flags :   Chills, Sweats:No fevers, but she does report some chills and night sweats.  Involuntary Weight Loss: Denies  Bowel/Bladder Incontinence: Denies  Saddle Anesthesia: Denies    PMHx:   Past Medical History:   Diagnosis Date   • Arthritis     all joints,r/t lupus   • Avascular necrosis of bones of both hips (HCC) 10/10/2016   • Clostridium difficile colitis 5/3/2011   • Dialysis patient    • ESBL (extended spectrum beta-lactamase) producing bacteria infection 8/25/2014   • Fibromyalgia    • Hypertension    • Lupus    • Pneumonia    • Psychiatric disorder     anxiety, depression   • Pyelonephritis 11/21/2017   • Renal  failure        PSHx:   Past Surgical History:   Procedure Laterality Date   • AV FISTULA REVISION Right 8/11/2018    Procedure: AV FISTULA REVISION- Graft and Thrombectomy;  Surgeon: Shabbir Ardon M.D.;  Location: [facility];  Service: General   • AV FISTULA THROMBOLYSIS Right 8/11/2018    Procedure: AV FISTULA THROMBOLYSIS;  Surgeon: Shabbir Ardon M.D.;  Location: [facility];  Service: General   • AV FISTULA CREATION Right 12/9/2017    Procedure: AV FISTULA CREATION-ARM FOR GRAFT;  Surgeon: Lesly Jansen M.D.;  Location: [facility];  Service: General   • THROMBECTOMY  12/9/2017    Procedure: THROMBECTOMY;  Surgeon: Lesly Jansen M.D.;  Location: [facility];  Service: General   • THROMBECTOMY Right 8/21/2016    Procedure: THROMBECTOMY - right AV fistula graft with grams;  Surgeon: Shabbir Ardon M.D.;  Location: [facility];  Service:    • ANGIOPLASTY BALLOON  8/21/2016    Procedure: ANGIOPLASTY BALLOON;  Surgeon: Shabbir Ardon M.D.;  Location: [facility];  Service:    • THROMBECTOMY Right 8/20/2016    Procedure: THROMBECTOMY AV GRAFT;  Surgeon: Shabbir Ardon M.D.;  Location: [facility];  Service:    • AV FISTULA CREATION Right 7/12/2016    Procedure: AV FISTULA CREATION WITH GRAFT BRACHIAL AXILLARY;  Surgeon: Shabbir Ardon M.D.;  Location: [facility];  Service:    • CLOSED REDUCTION Right 7/5/2016    Procedure: CLOSED REDUCTION- Hip ;  Surgeon: Michael Holman M.D.;  Location: [facility];  Service:    • HIP ARTHROPLASTY TOTAL Right 1/18/2016    Procedure: HIP ARTHROPLASTY TOTAL;  Surgeon: Michael Holman M.D.;  Location: [facility];  Service:    • AV FISTULA CREATION  2/3/2015    Performed by Shabbir Ardon M.D. at [facility]   • AV FISTULA CREATION  11/14/2014    Performed by Shabbir Ardon M.D. at [facility]   • AV FISTULA  CREATION  9/9/2014    Performed by Shabbir Ardon M.D. at SURGERY [facility]   • CATH PLACEMENT  9/9/2014    Performed by Shabbir Ardon M.D. at SURGERY [facility]   • OTHER  5/2011    tracheostomy   • GASTROSCOPY-ENDO  4/27/2011    Performed by PALMIRA DOAN at ENDOSCOPY [facility]   • COLONOSCOPY WITH BIOPSY  4/20/2011    Performed by ALCIRA CRUZ at ENDOSCOPY [facility]   • GASTROSCOPY-ENDO  4/18/2011    Performed by ALCIRA CRUZ at ENDOSCOPY [facility]   • GASTROSCOPY-ENDO  4/10/2011    Performed by SYED JURADO at ENDOSCOPY [facility]   • SCLEROTHERAPHY  4/10/2011    Performed by SYED JURADO at ENDOSCOPY [facility]   • OTHER ABDOMINAL SURGERY      kidney biopsy   • TRACHEOSTOMY         Family history   Denies neuromuscular disease  Family History   Problem Relation Age of Onset   • Heart Disease Mother    • Cancer Father        Medications:   Outpatient Prescriptions Marked as Taking for the 11/20/18 encounter (Office Visit) with Quinn Chandler M.D.   Medication Sig Dispense Refill   • Oxycodone HCl 20 MG Tab Take 1 Tab by mouth every 6 hours as needed (Take up to three a day as needed and one additional pill after dialysis (3 times a week)) for up to 28 days. 96 Tab 0   • lactulose 10 GM/15ML Solution Take 15 mL by mouth 2 times a day for 30 days. 1 Bottle 1   • docusate sodium (COLACE) 100 MG Cap Take 1 Cap by mouth 2 times a day for 30 days. 60 Cap 2   • omeprazole (PRILOSEC) 20 MG delayed-release capsule Take 1 Cap by mouth every day. 30 Cap 3   • promethazine (PHENERGAN) 25 MG Tab Take 25 mg by mouth every 6 hours as needed for Nausea/Vomiting.     • cinacalcet (SENSIPAR) 30 MG Tab Take 30 mg by mouth every bedtime.     • lidocaine (LIDODERM) 5 % Patch Apply 1 Patch to skin as directed every 24 hours. Apply to back     • ferrous sulfate 325 (65 FE) MG tablet Take 325 mg by mouth every day.     • ascorbic acid (ASCORBIC ACID) 500 MG  "Tab Take 500 mg by mouth every day.     • buPROPion (WELLBUTRIN XL) 150 MG XL tablet Take 150 mg by mouth every morning.     • hydroxychloroquine (PLAQUENIL) 200 MG Tab Take 200 mg by mouth every bedtime.     • cholecalciferol (VITAMIN D3) 5000 UNIT Cap Take 10,000 Units by mouth every day.     • trazodone (DESYREL) 50 MG Tab Take 1 Tab by mouth every bedtime. 30 Tab 0   • tizanidine (ZANAFLEX) 4 MG Tab Take 2 Tabs by mouth every bedtime. 60 Tab 0       Allergies:   Allergies   Allergen Reactions   • Lorazepam [Ativan] Anxiety     Patient becomes severely paranoid and agitated   • Morphine Itching     Tolerates Dilaudid   • Seasonal Runny Nose and Itching     Hay fever, sabiha brush       Social Hx:   Social History     Social History   • Marital status: Single     Spouse name: N/A   • Number of children: N/A   • Years of education: N/A     Occupational History   • Not on file.     Social History Main Topics   • Smoking status: Former Smoker     Packs/day: 0.50     Years: 18.00     Types: Cigarettes     Quit date: 6/13/2011   • Smokeless tobacco: Never Used      Comment: 1/2 ppd   • Alcohol use No   • Drug use: No   • Sexual activity: Not on file     Other Topics Concern   •  Service No   • Blood Transfusions Yes   • Caffeine Concern No   • Occupational Exposure No   • Hobby Hazards No   • Sleep Concern Yes   • Stress Concern Yes   • Weight Concern Yes   • Special Diet Yes   • Back Care No   • Exercise Yes   • Bike Helmet No   • Seat Belt Yes   • Self-Exams Yes     Social History Narrative   • No narrative on file       EXAMINATION     Physical Exam:   Vitals: Blood pressure 148/60, pulse 99, temperature 36.7 °C (98 °F), temperature source Temporal, height 1.651 m (5' 5\"), weight 86.4 kg (190 lb 7.6 oz), SpO2 90 %, not currently breastfeeding.    Constitutional:   Body Habitus: Body mass index is 31.7 kg/m².  Cooperation: Fully cooperates with exam  Appearance: Well-groomed no disheveled, in no acute distress, "   Respiratory-  breathing comfortable on room air, no wheezing.  Cardiovascular- No pitting edema noted in the lower extremities bilaterally  Psychiatric- alert and oriented ×3. Normal affect.   Spine: Tenderness to palpation over the lower thoracic rib cage on the left with note of tender points in the thoracic paraspinals.  Slightly tenderness over left trapezius, greater on the right.  Gait steady without loss of balance.      MEDICAL DECISION MAKING    DATA    Labs:     06/09/2018 TSH normal    BMP 08/11/2018  BUN 27, Cr 8.4, Ca 7.6, Glucose 121, Sodium 142, K+ 4.1, Cl 104, CO2 25    CBC 3.3, Hb 9.6, Hct 31.3, Plt 115    Lab Results   Component Value Date/Time    HBA1C 4.9 06/07/2018 02:29 AM        Imaging:   I reviewed the following radiology reports previously reviewed       Chest xray 06/07/2018: Minimal right-sided opacities as described above, suggesting pneumonia.               Results for orders placed during the hospital encounter of 07/19/17   MR-LUMBAR SPINE-W/O    Impression 1.  Diffuse decreased bone marrow signal intensity throughout the lumbar spine and sacrum, presumably secondary to chronic anemia.    2.  Otherwise unremarkable MRI scan of the lumbar spine without contrast.                                    DIAGNOSIS   Visit Diagnoses     ICD-10-CM   1. Chronic bilateral low back pain without sciatica M54.5    G89.29   2. Fibromyalgia M79.7   3. Myalgia M79.10   4. Muscle spasm M62.838   5. ESRD (end stage renal disease) on dialysis ([facility]) N18.6    Z99.2   6. Chronic, continuous use of opioids F11.90   7. Avascular necrosis of bones of both hips ([facility]) M87.051    M87.052   8. Therapeutic opioid induced constipation K59.03    T40.2X5A         ASSESSMENT and PLAN:     Debby Carrizales 37 y.o. Female returns for follow-up of her chronic pain related to lupus, AVN hips, low back and peripheral neuropathy    Debby was seen today for follow-up.    Diagnoses and all orders for this  "visit:    Chronic bilateral low back pain without sciatica  -     Oxycodone HCl 20 MG Tab; Take 1 Tab by mouth every 6 hours as needed (Take up to three a day as needed and one additional pill after dialysis (3 times a week)) for up to 28 days.    Fibromyalgia  -     Oxycodone HCl 20 MG Tab; Take 1 Tab by mouth every 6 hours as needed (Take up to three a day as needed and one additional pill after dialysis (3 times a week)) for up to 28 days.    Myalgia  -     REFERRAL TO PHYSIATRY (PMR)    Muscle spasm  -     REFERRAL TO PHYSIATRY (PMR)    ESRD (end stage renal disease) on dialysis ([facility])  -     lactulose 10 GM/15ML Solution; Take 15 mL by mouth 2 times a day for 30 days.    Chronic, continuous use of opioids    Avascular necrosis of bones of both hips ([facility])  -     Oxycodone HCl 20 MG Tab; Take 1 Tab by mouth every 6 hours as needed (Take up to three a day as needed and one additional pill after dialysis (3 times a week)) for up to 28 days.    Therapeutic opioid induced constipation  -     lactulose 10 GM/15ML Solution; Take 15 mL by mouth 2 times a day for 30 days.       Discussed trial of a series of trigger point injections.    Repeat millenium drug testing.    Continue with script to every 28 days.  She has difficulty with arranging transportation and it will be helpful to have it coincide with her appointments.    Trial mirelax and lactulose if needed to maintain regular bowel movements.    In prescribing controlled substances to this patient, I certify that I have obtained and reviewed the medical history of Debby Carrizales. I have also made a good ly effort to obtain applicable records from other providers who have treated the patient and records demonstrating the following: report of \"drug-seeking behavior,\" although I suspect that she has organic reasons for pain and will continue to monitor as appropriate.     I have conducted a physical exam and documented it. I have reviewed Ms. Carrizales’s " prescription history as maintained by the [place] Prescription Monitoring Program.     I have assessed the patient’s risk for abuse, dependency, and addiction using the validated Opioid Risk Tool available at https://www.mdcalc.com/chbasc-gccs-selg-ort-narcotic-abuse.     Given the above, I believe the benefits of controlled substance therapy outweigh the risks. The reasons for prescribing controlled substances include non-narcotic, oral analgesic alternatives have been inadequate for pain control and in my professional opinion, controlled substances are the only reasonable choice for this patient because she has limitations to medication choices due to being on dialysis.   Accordingly, I have discussed the risk and benefits, treatment plan, and alternative therapies with the patient.       Follow up: 4 weeks and series of trigger point injections    Thank you for allowing me to participate in the care of this patient. If you have any questions please not hesitate to contact me.      Please note that this dictation was created using voice recognition software. I have made every reasonable attempt to correct obvious errors but there may be errors of grammar and content that I may have overlooked prior to finalization of this note.      Quinn Chandler MD  Interventional Spine and Sports Physiatry  Physical Medicine and Rehabilitation  [facility]

## 2018-11-26 ENCOUNTER — HOSPITAL ENCOUNTER (EMERGENCY)
Facility: MEDICAL CENTER | Age: 38
End: 2018-11-26
Attending: EMERGENCY MEDICINE
Payer: MEDICARE

## 2018-11-26 ENCOUNTER — APPOINTMENT (OUTPATIENT)
Dept: RADIOLOGY | Facility: MEDICAL CENTER | Age: 38
End: 2018-11-26
Attending: EMERGENCY MEDICINE
Payer: MEDICARE

## 2018-11-26 VITALS
WEIGHT: 189.6 LBS | RESPIRATION RATE: 18 BRPM | HEIGHT: 65 IN | BODY MASS INDEX: 31.59 KG/M2 | OXYGEN SATURATION: 94 % | SYSTOLIC BLOOD PRESSURE: 179 MMHG | TEMPERATURE: 98.8 F | HEART RATE: 80 BPM | DIASTOLIC BLOOD PRESSURE: 109 MMHG

## 2018-11-26 DIAGNOSIS — R52 BODY ACHES: ICD-10-CM

## 2018-11-26 DIAGNOSIS — R06.02 SHORTNESS OF BREATH: ICD-10-CM

## 2018-11-26 DIAGNOSIS — Z91.158 DIALYSIS PATIENT, NONCOMPLIANT: ICD-10-CM

## 2018-11-26 LAB
ALBUMIN SERPL BCP-MCNC: 3.5 G/DL (ref 3.2–4.9)
ALBUMIN/GLOB SERPL: 1.3 G/DL
ALP SERPL-CCNC: 73 U/L (ref 30–99)
ALT SERPL-CCNC: 7 U/L (ref 2–50)
ANION GAP SERPL CALC-SCNC: 21 MMOL/L (ref 0–11.9)
AST SERPL-CCNC: 8 U/L (ref 12–45)
BASOPHILS # BLD AUTO: 0.6 % (ref 0–1.8)
BASOPHILS # BLD: 0.03 K/UL (ref 0–0.12)
BILIRUB SERPL-MCNC: 0.4 MG/DL (ref 0.1–1.5)
BUN SERPL-MCNC: 87 MG/DL (ref 8–22)
CALCIUM SERPL-MCNC: 7.9 MG/DL (ref 8.5–10.5)
CHLORIDE SERPL-SCNC: 104 MMOL/L (ref 96–112)
CO2 SERPL-SCNC: 14 MMOL/L (ref 20–33)
CREAT SERPL-MCNC: 16.62 MG/DL (ref 0.5–1.4)
EOSINOPHIL # BLD AUTO: 0.17 K/UL (ref 0–0.51)
EOSINOPHIL NFR BLD: 3.5 % (ref 0–6.9)
ERYTHROCYTE [DISTWIDTH] IN BLOOD BY AUTOMATED COUNT: 46.1 FL (ref 35.9–50)
FLUAV RNA SPEC QL NAA+PROBE: NEGATIVE
FLUBV RNA SPEC QL NAA+PROBE: NEGATIVE
GLOBULIN SER CALC-MCNC: 2.7 G/DL (ref 1.9–3.5)
GLUCOSE SERPL-MCNC: 100 MG/DL (ref 65–99)
HCT VFR BLD AUTO: 34.3 % (ref 37–47)
HGB BLD-MCNC: 11 G/DL (ref 12–16)
IMM GRANULOCYTES # BLD AUTO: 0.02 K/UL (ref 0–0.11)
IMM GRANULOCYTES NFR BLD AUTO: 0.4 % (ref 0–0.9)
LYMPHOCYTES # BLD AUTO: 1.14 K/UL (ref 1–4.8)
LYMPHOCYTES NFR BLD: 23.8 % (ref 22–41)
MCH RBC QN AUTO: 29.6 PG (ref 27–33)
MCHC RBC AUTO-ENTMCNC: 32.1 G/DL (ref 33.6–35)
MCV RBC AUTO: 92.5 FL (ref 81.4–97.8)
MONOCYTES # BLD AUTO: 0.51 K/UL (ref 0–0.85)
MONOCYTES NFR BLD AUTO: 10.6 % (ref 0–13.4)
NEUTROPHILS # BLD AUTO: 2.93 K/UL (ref 2–7.15)
NEUTROPHILS NFR BLD: 61.1 % (ref 44–72)
NRBC # BLD AUTO: 0 K/UL
NRBC BLD-RTO: 0 /100 WBC
PLATELET # BLD AUTO: 83 K/UL (ref 164–446)
PMV BLD AUTO: 11.4 FL (ref 9–12.9)
POTASSIUM SERPL-SCNC: 5.3 MMOL/L (ref 3.6–5.5)
PROT SERPL-MCNC: 6.2 G/DL (ref 6–8.2)
RBC # BLD AUTO: 3.71 M/UL (ref 4.2–5.4)
SODIUM SERPL-SCNC: 139 MMOL/L (ref 135–145)
WBC # BLD AUTO: 4.8 K/UL (ref 4.8–10.8)

## 2018-11-26 PROCEDURE — 87040 BLOOD CULTURE FOR BACTERIA: CPT | Mod: 91

## 2018-11-26 PROCEDURE — 80053 COMPREHEN METABOLIC PANEL: CPT

## 2018-11-26 PROCEDURE — 93005 ELECTROCARDIOGRAM TRACING: CPT | Performed by: EMERGENCY MEDICINE

## 2018-11-26 PROCEDURE — 96374 THER/PROPH/DIAG INJ IV PUSH: CPT

## 2018-11-26 PROCEDURE — 71046 X-RAY EXAM CHEST 2 VIEWS: CPT

## 2018-11-26 PROCEDURE — 87502 INFLUENZA DNA AMP PROBE: CPT

## 2018-11-26 PROCEDURE — 85025 COMPLETE CBC W/AUTO DIFF WBC: CPT

## 2018-11-26 PROCEDURE — 99285 EMERGENCY DEPT VISIT HI MDM: CPT

## 2018-11-26 PROCEDURE — 96376 TX/PRO/DX INJ SAME DRUG ADON: CPT

## 2018-11-26 PROCEDURE — 700111 HCHG RX REV CODE 636 W/ 250 OVERRIDE (IP): Performed by: EMERGENCY MEDICINE

## 2018-11-26 PROCEDURE — 96375 TX/PRO/DX INJ NEW DRUG ADDON: CPT

## 2018-11-26 RX ORDER — HYDROMORPHONE HYDROCHLORIDE 1 MG/ML
1 INJECTION, SOLUTION INTRAMUSCULAR; INTRAVENOUS; SUBCUTANEOUS ONCE
Status: COMPLETED | OUTPATIENT
Start: 2018-11-26 | End: 2018-11-26

## 2018-11-26 RX ORDER — ONDANSETRON 2 MG/ML
4 INJECTION INTRAMUSCULAR; INTRAVENOUS ONCE
Status: COMPLETED | OUTPATIENT
Start: 2018-11-26 | End: 2018-11-26

## 2018-11-26 RX ORDER — HYDROMORPHONE HYDROCHLORIDE 1 MG/ML
0.5 INJECTION, SOLUTION INTRAMUSCULAR; INTRAVENOUS; SUBCUTANEOUS ONCE
Status: COMPLETED | OUTPATIENT
Start: 2018-11-26 | End: 2018-11-26

## 2018-11-26 RX ADMIN — ONDANSETRON 4 MG: 2 INJECTION INTRAMUSCULAR; INTRAVENOUS at 11:05

## 2018-11-26 RX ADMIN — HYDROMORPHONE HYDROCHLORIDE 0.5 MG: 1 INJECTION, SOLUTION INTRAMUSCULAR; INTRAVENOUS; SUBCUTANEOUS at 11:18

## 2018-11-26 RX ADMIN — HEPARIN 500 UNITS: 100 SYRINGE at 13:32

## 2018-11-26 RX ADMIN — HYDROMORPHONE HYDROCHLORIDE 1 MG: 1 INJECTION, SOLUTION INTRAMUSCULAR; INTRAVENOUS; SUBCUTANEOUS at 09:11

## 2018-11-26 ASSESSMENT — PAIN SCALES - GENERAL
PAINLEVEL_OUTOF10: 8

## 2018-11-26 ASSESSMENT — LIFESTYLE VARIABLES: DO YOU DRINK ALCOHOL: NO

## 2018-11-26 NOTE — ED NOTES
Pt medicated at this time for nausea.  Pt now requesting additional pain medication.  MD notified at this time.  Pending new orders.  Will continue to monitor.

## 2018-11-26 NOTE — ED TRIAGE NOTES
"Chief Complaint   Patient presents with   • Shortness of Breath   • Lupus     c/o pain   Ambulatory to triage. ESRD, missed dialysis Friday. Hx PNA in August. C/o shortness of breath, pain. Mild WOB noted.     BP (!) 190/124   Pulse (!) 110   Temp 37.1 °C (98.8 °F) (Temporal)   Resp 17   Ht 1.651 m (5' 5\")   Wt 86 kg (189 lb 9.5 oz)   SpO2 92%   BMI 31.55 kg/m²     Pt Informed regarding triage process and verbalized understanding to inform triage tech or RN for any changes in condition.  Placed in lobby.    "

## 2018-11-26 NOTE — ED NOTES
Pt remains resting in bed at this time.  No needs verbalized.  Will continue to monitor.  No family present.  Lab aware of need for additional blood cultures.

## 2018-11-26 NOTE — ED NOTES
Pt remains resting in bed at this time.  No verbalized nausea.  Will continue to monitor.  No family present.

## 2018-11-26 NOTE — ED NOTES
Port access tolerated without difficulty.  Labs obtained at this time.  MD to bedside for evaluation.

## 2018-11-26 NOTE — ED NOTES
"Pt states last dose of pain medication for generalized body aches was at midnight, states she took 20mg oxycodone.  Pt states oxycodone \"didn't help.\"  Additionally, pt verbalizes that she is having vaginal bleeding, has not seen obgyn, pt states no menstrual cycles after last chemo treatment.    "

## 2018-11-26 NOTE — ED NOTES
Pt tolerated medication without difficulty.  No vomiting after medications.  No S&S of reactions.  Will continue to monitor.

## 2018-11-26 NOTE — ED PROVIDER NOTES
ED Provider Note    Scribed for Jean-Paul Castillo M.D. by Jake Castellanos. 11/26/2018, 8:56 AM.    Primary care provider: Sushila Manzano M.D.  Means of arrival: Walk in  History obtained from: Patient  History limited by: None    CHIEF COMPLAINT  Chief Complaint   Patient presents with   • Shortness of Breath   • Lupus     c/o pain     HPI  Debby Carrizales is a 38 y.o. female who presents to the Emergency Department with a history of end stage renal disease complaining of shortness of breath and multi-site joint pain that she believes may be secondary to lupus.  She is a MWF dialysis patient and missed her treatment on Friday because she was immobilized due to her pain.  The pain is described as multi-site joint pain is most prominent in the jaw (her Lupus is treated with Dr. Collado).  She reports she was recently seen for pneumonia and has been feeling poorly since that time.  She additionally complains of mouth sores.  There is associated coughing and left rib pain with deep breaths.  The patient states she had a fever 6 days ago that went away after 2 days.  She denies any asthmatic or prior lung troubles besides the pneumonia.  The patient did not get a flu shot this year.  She denies shaking or chills but notes that has been sweating however she believes this is due to missing dialysis.  The patient's nephrologist is Dr. Najjar.  She attempted 20 mg Oxycodone to mitigate her pain but reports this provide substantial relief.    REVIEW OF SYSTEMS  Pertinent positives include shortness of breath, cough, diaphoresis, mouth ulcerations, multi-site joint pain. Pertinent negatives include shaking, chills. All other systems negative.    PAST MEDICAL HISTORY   has a past medical history of Arthritis; Avascular necrosis of bones of both hips (HCC) (10/10/2016); Clostridium difficile colitis (5/3/2011); Dialysis patient; ESBL (extended spectrum beta-lactamase) producing bacteria infection (8/25/2014);  Fibromyalgia; Hypertension; Lupus; Pneumonia (); Psychiatric disorder; Pyelonephritis (11/21/2017); and Renal failure.    SURGICAL HISTORY   has a past surgical history that includes gastroscopy-endo (4/10/2011); sclerotheraphy (4/10/2011); gastroscopy-endo (4/18/2011); colonoscopy with biopsy (4/20/2011); gastroscopy-endo (4/27/2011); other (5/2011); other abdominal surgery; av fistula creation (9/9/2014); cath placement (9/9/2014); av fistula creation (11/14/2014); av fistula creation (2/3/2015); hip arthroplasty total (Right, 1/18/2016); closed reduction (Right, 7/5/2016); av fistula creation (Right, 7/12/2016); thrombectomy (Right, 8/20/2016); thrombectomy (Right, 8/21/2016); angioplasty balloon (8/21/2016); tracheostomy; av fistula creation (Right, 12/9/2017); thrombectomy (12/9/2017); av fistula revision (Right, 8/11/2018); and av fistula thrombolysis (Right, 8/11/2018).    SOCIAL HISTORY  Social History   Substance Use Topics   • Smoking status: Former Smoker     Packs/day: 0.50     Years: 18.00     Types: Cigarettes     Quit date: 6/13/2011   • Smokeless tobacco: Never Used      Comment: 1/2 ppd   • Alcohol use No      History   Drug Use No       FAMILY HISTORY  Family History   Problem Relation Age of Onset   • Heart Disease Mother    • Cancer Father        CURRENT MEDICATIONS  Home Medications     Reviewed by Nia Santoro R.N. (Registered Nurse) on 11/26/18 at 0800  Med List Status: <None>   Medication Last Dose Status   ascorbic acid (ASCORBIC ACID) 500 MG Tab Taking Active   buPROPion (WELLBUTRIN XL) 150 MG XL tablet Taking Active   cholecalciferol (VITAMIN D3) 5000 UNIT Cap Taking Active   cinacalcet (SENSIPAR) 30 MG Tab Taking Active   ferrous sulfate 325 (65 FE) MG tablet Taking Active   hydroxychloroquine (PLAQUENIL) 200 MG Tab Taking Active   lactulose 10 GM/15ML Solution  Active   lidocaine (LIDODERM) 5 % Patch Taking Active   omeprazole (PRILOSEC) 20 MG delayed-release capsule Taking  "Active   Oxycodone HCl 20 MG Tab  Active   pregabalin (LYRICA) 100 MG Cap Taking Active   promethazine (PHENERGAN) 25 MG Tab Taking Active   sevelamer carbonate (RENVELA) 800 MG Tab tablet  Active   tizanidine (ZANAFLEX) 4 MG Tab Taking Active   trazodone (DESYREL) 50 MG Tab Taking Active                ALLERGIES  Allergies   Allergen Reactions   • Lorazepam [Ativan] Anxiety     Patient becomes severely paranoid and agitated   • Morphine Itching     Tolerates Dilaudid   • Seasonal Runny Nose and Itching     Hay fever, sabiha brush       PHYSICAL EXAM  VITAL SIGNS: BP (!) 179/109   Pulse (!) 110   Temp 37.1 °C (98.8 °F) (Temporal)   Resp 16   Ht 1.651 m (5' 5\")   Wt 86 kg (189 lb 9.5 oz)   SpO2 90%   BMI 31.55 kg/m²     Constitutional: Well developed, Well nourished, mild distress.   HENT: Normocephalic, Atraumatic, Oropharynx moist. Ulceration to the right aspect of the tongue.  Eyes: Conjunctiva normal, No discharge.   Cardiovascular: Normal heart rate, Normal rhythm, No murmurs, equal pulses.   Pulmonary: Normal breath sounds, No respiratory distress, No wheezing, No rales, No rhonchi.  Chest: No chest wall tenderness or deformity.   Abdomen:Soft, No tenderness, No masses, no rebound, no guarding.    Back: No CVA tenderness.   Musculoskeletal: No major deformities noted, No tenderness. No edema.  Examination of the patient's hand and elbow joints.  There is no erythema or edema, no swelling to the joints.  There is no warmth of them.  Skin: Warm, Dry, No erythema, No rash.   Neurologic: Alert & oriented x 3, Normal motor function,  No focal deficits noted.   Psychiatric: Affect normal, Judgment normal, Mood normal.     LABS  Results for orders placed or performed during the hospital encounter of 11/26/18   CBC WITH DIFFERENTIAL   Result Value Ref Range    WBC 4.8 4.8 - 10.8 K/uL    RBC 3.71 (L) 4.20 - 5.40 M/uL    Hemoglobin 11.0 (L) 12.0 - 16.0 g/dL    Hematocrit 34.3 (L) 37.0 - 47.0 %    MCV 92.5 81.4 - " 97.8 fL    MCH 29.6 27.0 - 33.0 pg    MCHC 32.1 (L) 33.6 - 35.0 g/dL    RDW 46.1 35.9 - 50.0 fL    Platelet Count 83 (L) 164 - 446 K/uL    MPV 11.4 9.0 - 12.9 fL    Neutrophils-Polys 61.10 44.00 - 72.00 %    Lymphocytes 23.80 22.00 - 41.00 %    Monocytes 10.60 0.00 - 13.40 %    Eosinophils 3.50 0.00 - 6.90 %    Basophils 0.60 0.00 - 1.80 %    Immature Granulocytes 0.40 0.00 - 0.90 %    Nucleated RBC 0.00 /100 WBC    Neutrophils (Absolute) 2.93 2.00 - 7.15 K/uL    Lymphs (Absolute) 1.14 1.00 - 4.80 K/uL    Monos (Absolute) 0.51 0.00 - 0.85 K/uL    Eos (Absolute) 0.17 0.00 - 0.51 K/uL    Baso (Absolute) 0.03 0.00 - 0.12 K/uL    Immature Granulocytes (abs) 0.02 0.00 - 0.11 K/uL    NRBC (Absolute) 0.00 K/uL   COMP METABOLIC PANEL   Result Value Ref Range    Sodium 139 135 - 145 mmol/L    Potassium 5.3 3.6 - 5.5 mmol/L    Chloride 104 96 - 112 mmol/L    Co2 14 (L) 20 - 33 mmol/L    Anion Gap 21.0 (H) 0.0 - 11.9    Glucose 100 (H) 65 - 99 mg/dL    Bun 87 (HH) 8 - 22 mg/dL    Creatinine 16.62 (HH) 0.50 - 1.40 mg/dL    Calcium 7.9 (L) 8.5 - 10.5 mg/dL    AST(SGOT) 8 (L) 12 - 45 U/L    ALT(SGPT) 7 2 - 50 U/L    Alkaline Phosphatase 73 30 - 99 U/L    Total Bilirubin 0.4 0.1 - 1.5 mg/dL    Albumin 3.5 3.2 - 4.9 g/dL    Total Protein 6.2 6.0 - 8.2 g/dL    Globulin 2.7 1.9 - 3.5 g/dL    A-G Ratio 1.3 g/dL   Influenza A/B By PCR   Result Value Ref Range    Influenza virus A RNA Negative Negative    Influenza virus B, PCR Negative Negative   ESTIMATED GFR   Result Value Ref Range    GFR If  3 (A) >60 mL/min/1.73 m 2    GFR If Non African American 2 (A) >60 mL/min/1.73 m 2   EKG (NOW)   Result Value Ref Range    Report       [facility] Emergency Dept.    Test Date:  2018  Pt Name:    TAYLOR WARD               Department: ER  MRN:        [id number]                      Room:       [facility]  Gender:     Female                       Technician: [id number]  :        1980                    Requested By:DHIRAJ MARTIN  Order #:    [id number]                    Reading MD:    Measurements  Intervals                                Axis  Rate:       85                           P:          65  OK:         152                          QRS:        11  QRSD:       86                           T:          71  QT:         372  QTc:        443    Interpretive Statements  SINUS RHYTHM  BASELINE WANDER IN LEAD(S) V1  Compared to ECG 08/10/2018 16:07:41  No significant changes        All labs reviewed by me.    EKG  12 Lead EKG interpreted by me shows a normal sinus rhythm at a rate of 85. Axis normal. No ST elevations. Single T wave inversion in Avl.  Final impression: normal EKG.    RADIOLOGY  DX-CHEST-2 VIEWS   Final Result      Blunted left costophrenic angle. No consolidation identified.        The radiologist's interpretation of all radiological studies have been reviewed by me.    COURSE & MEDICAL DECISION MAKING  Pertinent Labs & Imaging studies reviewed. (See chart for details)  Reviewed patient's records prior to evaluation.      8:56 AM - Patient seen and examined at bedside. Patient will be treated with 1 mg Dilaudid.  Ordered Chest Xray, CBC, CMP, Blood culture x2, influenza by PCR, and EKG to evaluate her symptoms. The differential diagnoses include but are not limited to: lupus flare, volume overload, electrolyte abnormality, pneumonia.  I discussed the current plan of care with the patient and she agrees with this.       11:10 AM - Ordered 0.5 mg Dilaudid to help mitigate patient's pain.      12:35 PM Patient was reevaluated at bedside. Discussed lab and radiology results with the patient and informed them that we were unable to get a hold of Dr. Collado. We have discussed proceeding with discharge at this time so she may make it to her dialysis appointment.   She will follow up with Dr. Collado regarding further management of her lupus and she understands we will not proceed with a steroid dose  due to potential side effects.  Patient agrees with this plan of care.    12:49 PM I discussed the patient's case and the above findings with Dr. Collado (rheumatology) who suggested to obtain a sed rate and if it is only joint pain that I should not give the patient a dose of steroids unless she has a positive sed rate.  He will follow up with the patient as well.   Ordered heparin pf injection and [place] SED rate.      1:17 PM  Discussed the updated plan of care with the patient.  She will be discharged at this time so she may make her dialysis appointment.  I will call her with the sed rate results, if it is positive we will proceed with steroid utilization.  Patient agrees with this plan and feels safe to be discharged at this time.      Medical Decision Making: This point time I think the patient's shortness of breath is likely secondary to the mild amount of volume overload secondary to missing her dialysis.  I do not find any objective findings of a lupus flare.  After discussion with Dr. Collado I do not think the patient is a candidate for steroids.  I will have her follow-up as an outpatient.  Patient does not have any unilateral leg swelling, no signs of hypoxia here, she is not tachycardic I do not think she has a pulmonary embolism.  I will have the patient discharged to follow-up and get her dialysis as scheduled at 3 PM this afternoon.    The patient will return for new or worsening symptoms and is stable at the time of discharge.    The patient is referred to a primary physician for blood pressure management, diabetic screening, and for all other preventative health concerns.    DISPOSITION:  Patient will be discharged home in stable condition.    FOLLOW UP:  Sushila Manzano M.D.  [address] [facility] [address]  [place] [id number] [phone]    Schedule an appointment as soon as possible for a visit in 3 days      Sherwin Collado M.D.  [address] [address]  [place]  [address]  [phone]    Schedule an appointment as soon as possible for a visit       OUTPATIENT MEDICATIONS:  Discharge Medication List as of 11/26/2018  1:35 PM            FINAL IMPRESSION  1. Shortness of breath    2. Dialysis patient, noncompliant (HCC)    3. Body aches          Jake BARGER (Scribbridgett), am scribing for, and in the presence of, Jean-Paul Castillo M.D.    Electronically signed by: Jake Castellanos (Jaunibbridgett), 11/26/2018    IJean-Paul M.D. personally performed the services described in this documentation, as scribed by Jake Castellanos in my presence, and it is both accurate and complete.  C.    The note accurately reflects work and decisions made by me.  Jean-Paul Castillo  11/26/2018  3:56 PM

## 2018-11-26 NOTE — ED NOTES
Reviewed discharge instructions, pt verbalized understanding of instructions. States she will schedule follow-up appointment and will go to dialysis at 3pm today. Denies further questions at this time. Pt ambulatory out of ER with stable gait.

## 2018-11-26 NOTE — ED NOTES
Assumed pt care.  Pt states that she has M,W,F dialysis, states that she did not go on Friday, last dialysis was Wednesday.  PT states she is end stage renal disease.  Pt verbalizes hx of pneumonia in September, states that she does not feel as though it has resolved.  Hx of intubation.  Pt states she feels as though she is having a lupus flare up at this time, generalized mouth pain.

## 2018-11-27 ENCOUNTER — PATIENT OUTREACH (OUTPATIENT)
Dept: HEALTH INFORMATION MANAGEMENT | Facility: OTHER | Age: 38
End: 2018-11-27

## 2018-11-28 LAB — EKG IMPRESSION: NORMAL

## 2018-12-04 ENCOUNTER — HOSPITAL ENCOUNTER (INPATIENT)
Facility: MEDICAL CENTER | Age: 38
LOS: 12 days | DRG: 091 | End: 2018-12-17
Attending: EMERGENCY MEDICINE | Admitting: INTERNAL MEDICINE
Payer: MEDICARE

## 2018-12-04 ENCOUNTER — APPOINTMENT (OUTPATIENT)
Dept: RADIOLOGY | Facility: MEDICAL CENTER | Age: 38
DRG: 091 | End: 2018-12-04
Attending: EMERGENCY MEDICINE
Payer: MEDICARE

## 2018-12-04 DIAGNOSIS — M79.7 FIBROMYALGIA: ICD-10-CM

## 2018-12-04 DIAGNOSIS — M54.50 CHRONIC BILATERAL LOW BACK PAIN WITHOUT SCIATICA: ICD-10-CM

## 2018-12-04 DIAGNOSIS — Z99.2 ESRD (END STAGE RENAL DISEASE) ON DIALYSIS (HCC): ICD-10-CM

## 2018-12-04 DIAGNOSIS — N18.6 ESRD (END STAGE RENAL DISEASE) ON DIALYSIS (HCC): ICD-10-CM

## 2018-12-04 DIAGNOSIS — N18.6 ESRD (END STAGE RENAL DISEASE) (HCC): ICD-10-CM

## 2018-12-04 DIAGNOSIS — G89.29 CHRONIC BILATERAL LOW BACK PAIN WITHOUT SCIATICA: ICD-10-CM

## 2018-12-04 DIAGNOSIS — R10.30 LOWER ABDOMINAL PAIN: ICD-10-CM

## 2018-12-04 DIAGNOSIS — G40.901 STATUS EPILEPTICUS (HCC): ICD-10-CM

## 2018-12-04 DIAGNOSIS — M87.051 AVASCULAR NECROSIS OF BONES OF BOTH HIPS (HCC): ICD-10-CM

## 2018-12-04 DIAGNOSIS — M87.052 AVASCULAR NECROSIS OF BONES OF BOTH HIPS (HCC): ICD-10-CM

## 2018-12-04 DIAGNOSIS — Z91.199 MEDICAL NON-COMPLIANCE: ICD-10-CM

## 2018-12-04 DIAGNOSIS — M32.14: ICD-10-CM

## 2018-12-04 DIAGNOSIS — N19 UREMIA: ICD-10-CM

## 2018-12-04 DIAGNOSIS — G89.4 CHRONIC PAIN DISORDER: ICD-10-CM

## 2018-12-04 LAB
ALBUMIN SERPL BCP-MCNC: 3.6 G/DL (ref 3.2–4.9)
ALBUMIN/GLOB SERPL: 1.1 G/DL
ALP SERPL-CCNC: 62 U/L (ref 30–99)
ALT SERPL-CCNC: 9 U/L (ref 2–50)
ANION GAP SERPL CALC-SCNC: 27 MMOL/L (ref 0–11.9)
AST SERPL-CCNC: 14 U/L (ref 12–45)
B-HCG SERPL-ACNC: 0.9 MIU/ML (ref 0–5)
BASOPHILS # BLD AUTO: 0.4 % (ref 0–1.8)
BASOPHILS # BLD: 0.04 K/UL (ref 0–0.12)
BILIRUB SERPL-MCNC: 0.5 MG/DL (ref 0.1–1.5)
BNP SERPL-MCNC: >5000 PG/ML (ref 0–100)
BUN SERPL-MCNC: 157 MG/DL (ref 8–22)
CALCIUM SERPL-MCNC: 8.1 MG/DL (ref 8.5–10.5)
CHLORIDE SERPL-SCNC: 98 MMOL/L (ref 96–112)
CO2 SERPL-SCNC: 8 MMOL/L (ref 20–33)
CREAT SERPL-MCNC: 20.4 MG/DL (ref 0.5–1.4)
EOSINOPHIL # BLD AUTO: 0.05 K/UL (ref 0–0.51)
EOSINOPHIL NFR BLD: 0.5 % (ref 0–6.9)
ERYTHROCYTE [DISTWIDTH] IN BLOOD BY AUTOMATED COUNT: 47.3 FL (ref 35.9–50)
FLUAV RNA SPEC QL NAA+PROBE: NEGATIVE
FLUBV RNA SPEC QL NAA+PROBE: NEGATIVE
GLOBULIN SER CALC-MCNC: 3.4 G/DL (ref 1.9–3.5)
GLUCOSE SERPL-MCNC: 68 MG/DL (ref 65–99)
HCT VFR BLD AUTO: 29.1 % (ref 37–47)
HGB BLD-MCNC: 9.4 G/DL (ref 12–16)
IMM GRANULOCYTES # BLD AUTO: 0.14 K/UL (ref 0–0.11)
IMM GRANULOCYTES NFR BLD AUTO: 1.4 % (ref 0–0.9)
LIPASE SERPL-CCNC: 23 U/L (ref 11–82)
LYMPHOCYTES # BLD AUTO: 0.69 K/UL (ref 1–4.8)
LYMPHOCYTES NFR BLD: 6.9 % (ref 22–41)
MCH RBC QN AUTO: 30.1 PG (ref 27–33)
MCHC RBC AUTO-ENTMCNC: 32.3 G/DL (ref 33.6–35)
MCV RBC AUTO: 93.3 FL (ref 81.4–97.8)
MONOCYTES # BLD AUTO: 0.53 K/UL (ref 0–0.85)
MONOCYTES NFR BLD AUTO: 5.3 % (ref 0–13.4)
NEUTROPHILS # BLD AUTO: 8.59 K/UL (ref 2–7.15)
NEUTROPHILS NFR BLD: 85.5 % (ref 44–72)
NRBC # BLD AUTO: 0 K/UL
NRBC BLD-RTO: 0 /100 WBC
PLATELET # BLD AUTO: 80 K/UL (ref 164–446)
PMV BLD AUTO: 12.4 FL (ref 9–12.9)
POTASSIUM SERPL-SCNC: 6.2 MMOL/L (ref 3.6–5.5)
PROT SERPL-MCNC: 7 G/DL (ref 6–8.2)
RBC # BLD AUTO: 3.12 M/UL (ref 4.2–5.4)
SODIUM SERPL-SCNC: 133 MMOL/L (ref 135–145)
TROPONIN I SERPL-MCNC: 0.24 NG/ML (ref 0–0.04)
WBC # BLD AUTO: 10 K/UL (ref 4.8–10.8)

## 2018-12-04 PROCEDURE — 87502 INFLUENZA DNA AMP PROBE: CPT

## 2018-12-04 PROCEDURE — 93005 ELECTROCARDIOGRAM TRACING: CPT | Performed by: EMERGENCY MEDICINE

## 2018-12-04 PROCEDURE — 74176 CT ABD & PELVIS W/O CONTRAST: CPT

## 2018-12-04 PROCEDURE — 71045 X-RAY EXAM CHEST 1 VIEW: CPT

## 2018-12-04 PROCEDURE — G0378 HOSPITAL OBSERVATION PER HR: HCPCS

## 2018-12-04 PROCEDURE — 87086 URINE CULTURE/COLONY COUNT: CPT

## 2018-12-04 PROCEDURE — 83690 ASSAY OF LIPASE: CPT

## 2018-12-04 PROCEDURE — 99220 PR INITIAL OBSERVATION CARE,LEVL III: CPT | Mod: AI | Performed by: FAMILY MEDICINE

## 2018-12-04 PROCEDURE — 96375 TX/PRO/DX INJ NEW DRUG ADDON: CPT

## 2018-12-04 PROCEDURE — 99285 EMERGENCY DEPT VISIT HI MDM: CPT

## 2018-12-04 PROCEDURE — 36415 COLL VENOUS BLD VENIPUNCTURE: CPT

## 2018-12-04 PROCEDURE — 700102 HCHG RX REV CODE 250 W/ 637 OVERRIDE(OP): Performed by: FAMILY MEDICINE

## 2018-12-04 PROCEDURE — 87040 BLOOD CULTURE FOR BACTERIA: CPT

## 2018-12-04 PROCEDURE — 700111 HCHG RX REV CODE 636 W/ 250 OVERRIDE (IP): Performed by: EMERGENCY MEDICINE

## 2018-12-04 PROCEDURE — A9270 NON-COVERED ITEM OR SERVICE: HCPCS | Performed by: FAMILY MEDICINE

## 2018-12-04 PROCEDURE — 85025 COMPLETE CBC W/AUTO DIFF WBC: CPT

## 2018-12-04 PROCEDURE — 96374 THER/PROPH/DIAG INJ IV PUSH: CPT

## 2018-12-04 PROCEDURE — 80053 COMPREHEN METABOLIC PANEL: CPT

## 2018-12-04 PROCEDURE — 84702 CHORIONIC GONADOTROPIN TEST: CPT

## 2018-12-04 PROCEDURE — 84484 ASSAY OF TROPONIN QUANT: CPT

## 2018-12-04 PROCEDURE — 83880 ASSAY OF NATRIURETIC PEPTIDE: CPT

## 2018-12-04 PROCEDURE — 81001 URINALYSIS AUTO W/SCOPE: CPT

## 2018-12-04 RX ORDER — ONDANSETRON 2 MG/ML
4 INJECTION INTRAMUSCULAR; INTRAVENOUS ONCE
Status: COMPLETED | OUTPATIENT
Start: 2018-12-04 | End: 2018-12-04

## 2018-12-04 RX ORDER — HYDROMORPHONE HYDROCHLORIDE 1 MG/ML
1 INJECTION, SOLUTION INTRAMUSCULAR; INTRAVENOUS; SUBCUTANEOUS ONCE
Status: COMPLETED | OUTPATIENT
Start: 2018-12-04 | End: 2018-12-04

## 2018-12-04 RX ORDER — SODIUM POLYSTYRENE SULFONATE 15 G/60ML
30 SUSPENSION ORAL; RECTAL ONCE
Status: COMPLETED | OUTPATIENT
Start: 2018-12-04 | End: 2018-12-04

## 2018-12-04 RX ADMIN — ONDANSETRON 4 MG: 2 INJECTION INTRAMUSCULAR; INTRAVENOUS at 20:16

## 2018-12-04 RX ADMIN — SODIUM POLYSTYRENE SULFONATE 30 G: 15 SUSPENSION ORAL; RECTAL at 23:54

## 2018-12-04 RX ADMIN — HYDROMORPHONE HYDROCHLORIDE 1 MG: 1 INJECTION, SOLUTION INTRAMUSCULAR; INTRAVENOUS; SUBCUTANEOUS at 20:16

## 2018-12-05 PROBLEM — G93.40 ENCEPHALOPATHY: Status: ACTIVE | Noted: 2018-12-05

## 2018-12-05 LAB
ALBUMIN SERPL BCP-MCNC: 3.5 G/DL (ref 3.2–4.9)
ALBUMIN/GLOB SERPL: 1.1 G/DL
ALP SERPL-CCNC: 61 U/L (ref 30–99)
ALT SERPL-CCNC: 7 U/L (ref 2–50)
ANION GAP SERPL CALC-SCNC: 21 MMOL/L (ref 0–11.9)
APPEARANCE UR: ABNORMAL
AST SERPL-CCNC: 14 U/L (ref 12–45)
BACTERIA #/AREA URNS HPF: ABNORMAL /HPF
BASOPHILS # BLD AUTO: 0.3 % (ref 0–1.8)
BASOPHILS # BLD: 0.03 K/UL (ref 0–0.12)
BILIRUB SERPL-MCNC: 0.7 MG/DL (ref 0.1–1.5)
BILIRUB UR QL STRIP.AUTO: NEGATIVE
BUN SERPL-MCNC: 80 MG/DL (ref 8–22)
CALCIUM SERPL-MCNC: 8.5 MG/DL (ref 8.5–10.5)
CHLORIDE SERPL-SCNC: 97 MMOL/L (ref 96–112)
CO2 SERPL-SCNC: 19 MMOL/L (ref 20–33)
COLOR UR: YELLOW
CREAT SERPL-MCNC: 13.09 MG/DL (ref 0.5–1.4)
EKG IMPRESSION: NORMAL
EOSINOPHIL # BLD AUTO: 0.06 K/UL (ref 0–0.51)
EOSINOPHIL NFR BLD: 0.6 % (ref 0–6.9)
EPI CELLS #/AREA URNS HPF: ABNORMAL /HPF
ERYTHROCYTE [DISTWIDTH] IN BLOOD BY AUTOMATED COUNT: 44.6 FL (ref 35.9–50)
GLOBULIN SER CALC-MCNC: 3.1 G/DL (ref 1.9–3.5)
GLUCOSE SERPL-MCNC: 73 MG/DL (ref 65–99)
GLUCOSE UR STRIP.AUTO-MCNC: 100 MG/DL
HBV SURFACE AB SERPL IA-ACNC: <3.1 MIU/ML (ref 0–10)
HBV SURFACE AG SER QL: NEGATIVE
HCT VFR BLD AUTO: 27 % (ref 37–47)
HGB BLD-MCNC: 9.2 G/DL (ref 12–16)
HYALINE CASTS #/AREA URNS LPF: ABNORMAL /LPF
IMM GRANULOCYTES # BLD AUTO: 0.07 K/UL (ref 0–0.11)
IMM GRANULOCYTES NFR BLD AUTO: 0.7 % (ref 0–0.9)
KETONES UR STRIP.AUTO-MCNC: NEGATIVE MG/DL
LEUKOCYTE ESTERASE UR QL STRIP.AUTO: ABNORMAL
LYMPHOCYTES # BLD AUTO: 1 K/UL (ref 1–4.8)
LYMPHOCYTES NFR BLD: 10.5 % (ref 22–41)
MCH RBC QN AUTO: 30.5 PG (ref 27–33)
MCHC RBC AUTO-ENTMCNC: 34.1 G/DL (ref 33.6–35)
MCV RBC AUTO: 89.4 FL (ref 81.4–97.8)
MICRO URNS: ABNORMAL
MONOCYTES # BLD AUTO: 0.82 K/UL (ref 0–0.85)
MONOCYTES NFR BLD AUTO: 8.6 % (ref 0–13.4)
NEUTROPHILS # BLD AUTO: 7.56 K/UL (ref 2–7.15)
NEUTROPHILS NFR BLD: 79.3 % (ref 44–72)
NITRITE UR QL STRIP.AUTO: NEGATIVE
NRBC # BLD AUTO: 0 K/UL
NRBC BLD-RTO: 0 /100 WBC
PH UR STRIP.AUTO: 8.5 [PH]
PLATELET # BLD AUTO: 101 K/UL (ref 164–446)
PMV BLD AUTO: 12.3 FL (ref 9–12.9)
POTASSIUM SERPL-SCNC: 3.5 MMOL/L (ref 3.6–5.5)
PROT SERPL-MCNC: 6.6 G/DL (ref 6–8.2)
PROT UR QL STRIP: 300 MG/DL
RBC # BLD AUTO: 3.02 M/UL (ref 4.2–5.4)
RBC # URNS HPF: ABNORMAL /HPF
RBC UR QL AUTO: ABNORMAL
SODIUM SERPL-SCNC: 137 MMOL/L (ref 135–145)
SP GR UR STRIP.AUTO: 1.01
TROPONIN I SERPL-MCNC: 0.25 NG/ML (ref 0–0.04)
UROBILINOGEN UR STRIP.AUTO-MCNC: 0.2 MG/DL
WBC # BLD AUTO: 9.5 K/UL (ref 4.8–10.8)
WBC #/AREA URNS HPF: ABNORMAL /HPF

## 2018-12-05 PROCEDURE — 80053 COMPREHEN METABOLIC PANEL: CPT

## 2018-12-05 PROCEDURE — 96376 TX/PRO/DX INJ SAME DRUG ADON: CPT

## 2018-12-05 PROCEDURE — 86706 HEP B SURFACE ANTIBODY: CPT

## 2018-12-05 PROCEDURE — 700105 HCHG RX REV CODE 258: Performed by: INTERNAL MEDICINE

## 2018-12-05 PROCEDURE — 99232 SBSQ HOSP IP/OBS MODERATE 35: CPT | Performed by: INTERNAL MEDICINE

## 2018-12-05 PROCEDURE — 93010 ELECTROCARDIOGRAM REPORT: CPT | Performed by: INTERNAL MEDICINE

## 2018-12-05 PROCEDURE — 700102 HCHG RX REV CODE 250 W/ 637 OVERRIDE(OP): Performed by: FAMILY MEDICINE

## 2018-12-05 PROCEDURE — 90935 HEMODIALYSIS ONE EVALUATION: CPT

## 2018-12-05 PROCEDURE — 700111 HCHG RX REV CODE 636 W/ 250 OVERRIDE (IP): Performed by: INTERNAL MEDICINE

## 2018-12-05 PROCEDURE — 5A1D70Z PERFORMANCE OF URINARY FILTRATION, INTERMITTENT, LESS THAN 6 HOURS PER DAY: ICD-10-PCS | Performed by: INTERNAL MEDICINE

## 2018-12-05 PROCEDURE — 87340 HEPATITIS B SURFACE AG IA: CPT

## 2018-12-05 PROCEDURE — A9270 NON-COVERED ITEM OR SERVICE: HCPCS | Performed by: INTERNAL MEDICINE

## 2018-12-05 PROCEDURE — 770020 HCHG ROOM/CARE - TELE (206)

## 2018-12-05 PROCEDURE — 700102 HCHG RX REV CODE 250 W/ 637 OVERRIDE(OP): Performed by: INTERNAL MEDICINE

## 2018-12-05 PROCEDURE — 306581 SET INFUSION,POWERLOC 20G X 1: Performed by: INTERNAL MEDICINE

## 2018-12-05 PROCEDURE — 96365 THER/PROPH/DIAG IV INF INIT: CPT

## 2018-12-05 PROCEDURE — 700111 HCHG RX REV CODE 636 W/ 250 OVERRIDE (IP): Performed by: FAMILY MEDICINE

## 2018-12-05 PROCEDURE — 85025 COMPLETE CBC W/AUTO DIFF WBC: CPT

## 2018-12-05 PROCEDURE — 93005 ELECTROCARDIOGRAM TRACING: CPT | Performed by: INTERNAL MEDICINE

## 2018-12-05 PROCEDURE — 700101 HCHG RX REV CODE 250: Performed by: INTERNAL MEDICINE

## 2018-12-05 PROCEDURE — 84484 ASSAY OF TROPONIN QUANT: CPT

## 2018-12-05 PROCEDURE — 700105 HCHG RX REV CODE 258

## 2018-12-05 PROCEDURE — A9270 NON-COVERED ITEM OR SERVICE: HCPCS | Performed by: FAMILY MEDICINE

## 2018-12-05 RX ORDER — ONDANSETRON 4 MG/1
4 TABLET, ORALLY DISINTEGRATING ORAL EVERY 4 HOURS PRN
Status: DISCONTINUED | OUTPATIENT
Start: 2018-12-05 | End: 2018-12-17 | Stop reason: HOSPADM

## 2018-12-05 RX ORDER — SODIUM CHLORIDE 9 MG/ML
INJECTION, SOLUTION INTRAVENOUS CONTINUOUS
Status: DISCONTINUED | OUTPATIENT
Start: 2018-12-05 | End: 2018-12-07

## 2018-12-05 RX ORDER — POLYETHYLENE GLYCOL 3350 17 G/17G
1 POWDER, FOR SOLUTION ORAL
Status: DISCONTINUED | OUTPATIENT
Start: 2018-12-05 | End: 2018-12-14

## 2018-12-05 RX ORDER — ACETAMINOPHEN 325 MG/1
650 TABLET ORAL EVERY 4 HOURS PRN
Status: DISCONTINUED | OUTPATIENT
Start: 2018-12-05 | End: 2018-12-14

## 2018-12-05 RX ORDER — ONDANSETRON 2 MG/ML
4 INJECTION INTRAMUSCULAR; INTRAVENOUS EVERY 4 HOURS PRN
Status: DISCONTINUED | OUTPATIENT
Start: 2018-12-05 | End: 2018-12-17 | Stop reason: HOSPADM

## 2018-12-05 RX ORDER — PROMETHAZINE HYDROCHLORIDE 25 MG/1
12.5-25 TABLET ORAL EVERY 4 HOURS PRN
Status: DISCONTINUED | OUTPATIENT
Start: 2018-12-05 | End: 2018-12-14

## 2018-12-05 RX ORDER — ASCORBIC ACID 500 MG
500 TABLET ORAL DAILY
Status: DISCONTINUED | OUTPATIENT
Start: 2018-12-05 | End: 2018-12-14

## 2018-12-05 RX ORDER — OMEPRAZOLE 20 MG/1
20 CAPSULE, DELAYED RELEASE ORAL DAILY
Status: DISCONTINUED | OUTPATIENT
Start: 2018-12-05 | End: 2018-12-14

## 2018-12-05 RX ORDER — AMOXICILLIN 250 MG
2 CAPSULE ORAL 2 TIMES DAILY
Status: DISCONTINUED | OUTPATIENT
Start: 2018-12-05 | End: 2018-12-14

## 2018-12-05 RX ORDER — FERROUS SULFATE 325(65) MG
325 TABLET ORAL DAILY
Status: DISCONTINUED | OUTPATIENT
Start: 2018-12-05 | End: 2018-12-14

## 2018-12-05 RX ORDER — HEPARIN SODIUM 5000 [USP'U]/ML
5000 INJECTION, SOLUTION INTRAVENOUS; SUBCUTANEOUS EVERY 8 HOURS
Status: DISCONTINUED | OUTPATIENT
Start: 2018-12-05 | End: 2018-12-05

## 2018-12-05 RX ORDER — TRAZODONE HYDROCHLORIDE 50 MG/1
50 TABLET ORAL
Status: DISCONTINUED | OUTPATIENT
Start: 2018-12-05 | End: 2018-12-17 | Stop reason: HOSPADM

## 2018-12-05 RX ORDER — PREGABALIN 75 MG/1
75 CAPSULE ORAL DAILY
Status: DISCONTINUED | OUTPATIENT
Start: 2018-12-06 | End: 2018-12-06

## 2018-12-05 RX ORDER — SODIUM CHLORIDE 9 MG/ML
INJECTION, SOLUTION INTRAVENOUS
Status: COMPLETED
Start: 2018-12-05 | End: 2018-12-05

## 2018-12-05 RX ORDER — BUPROPION HYDROCHLORIDE 150 MG/1
150 TABLET, EXTENDED RELEASE ORAL EVERY MORNING
Status: DISCONTINUED | OUTPATIENT
Start: 2018-12-05 | End: 2018-12-13

## 2018-12-05 RX ORDER — SIMETHICONE 80 MG
80 TABLET,CHEWABLE ORAL 3 TIMES DAILY PRN
Status: DISCONTINUED | OUTPATIENT
Start: 2018-12-05 | End: 2018-12-14

## 2018-12-05 RX ORDER — SEVELAMER CARBONATE 800 MG/1
2400 TABLET, FILM COATED ORAL
Status: DISCONTINUED | OUTPATIENT
Start: 2018-12-05 | End: 2018-12-14

## 2018-12-05 RX ORDER — LIDOCAINE AND PRILOCAINE 25; 25 MG/G; MG/G
CREAM TOPICAL ONCE
Status: COMPLETED | OUTPATIENT
Start: 2018-12-05 | End: 2018-12-05

## 2018-12-05 RX ORDER — OXYCODONE HYDROCHLORIDE 5 MG/1
5 TABLET ORAL EVERY 4 HOURS PRN
Status: DISCONTINUED | OUTPATIENT
Start: 2018-12-05 | End: 2018-12-06

## 2018-12-05 RX ORDER — HYDROXYCHLOROQUINE SULFATE 200 MG/1
200 TABLET, FILM COATED ORAL
Status: DISCONTINUED | OUTPATIENT
Start: 2018-12-05 | End: 2018-12-14

## 2018-12-05 RX ORDER — PREGABALIN 100 MG/1
100 CAPSULE ORAL 3 TIMES DAILY
Status: DISCONTINUED | OUTPATIENT
Start: 2018-12-05 | End: 2018-12-05

## 2018-12-05 RX ORDER — BISACODYL 10 MG
10 SUPPOSITORY, RECTAL RECTAL
Status: DISCONTINUED | OUTPATIENT
Start: 2018-12-05 | End: 2018-12-14

## 2018-12-05 RX ORDER — PROMETHAZINE HYDROCHLORIDE 25 MG/1
12.5-25 SUPPOSITORY RECTAL EVERY 4 HOURS PRN
Status: DISCONTINUED | OUTPATIENT
Start: 2018-12-05 | End: 2018-12-17 | Stop reason: HOSPADM

## 2018-12-05 RX ORDER — TIZANIDINE 4 MG/1
8 TABLET ORAL
Status: DISCONTINUED | OUTPATIENT
Start: 2018-12-05 | End: 2018-12-05

## 2018-12-05 RX ADMIN — SEVELAMER CARBONATE 2400 MG: 800 TABLET, FILM COATED ORAL at 12:34

## 2018-12-05 RX ADMIN — ONDANSETRON HYDROCHLORIDE 4 MG: 2 INJECTION, SOLUTION INTRAMUSCULAR; INTRAVENOUS at 07:16

## 2018-12-05 RX ADMIN — OXYCODONE HYDROCHLORIDE 5 MG: 5 TABLET ORAL at 09:31

## 2018-12-05 RX ADMIN — LIDOCAINE AND PRILOCAINE 1 APPLICATION: 25; 25 CREAM TOPICAL at 16:12

## 2018-12-05 RX ADMIN — BUPROPION HYDROCHLORIDE 150 MG: 150 TABLET, EXTENDED RELEASE ORAL at 04:56

## 2018-12-05 RX ADMIN — HEPARIN 500 UNITS: 100 SYRINGE at 18:35

## 2018-12-05 RX ADMIN — VITAMIN D, TAB 1000IU (100/BT) 1000 UNITS: 25 TAB at 04:56

## 2018-12-05 RX ADMIN — ONDANSETRON HYDROCHLORIDE 4 MG: 2 INJECTION, SOLUTION INTRAMUSCULAR; INTRAVENOUS at 00:34

## 2018-12-05 RX ADMIN — PROCHLORPERAZINE EDISYLATE 10 MG: 5 INJECTION INTRAMUSCULAR; INTRAVENOUS at 09:31

## 2018-12-05 RX ADMIN — PIPERACILLIN SODIUM AND TAZOBACTAM SODIUM 2.25 G: 3; .375 INJECTION, POWDER, FOR SOLUTION INTRAVENOUS at 20:09

## 2018-12-05 RX ADMIN — PREGABALIN 100 MG: 100 CAPSULE ORAL at 04:56

## 2018-12-05 RX ADMIN — SEVELAMER CARBONATE 2400 MG: 800 TABLET, FILM COATED ORAL at 17:38

## 2018-12-05 RX ADMIN — STANDARDIZED SENNA CONCENTRATE AND DOCUSATE SODIUM 2 TABLET: 8.6; 5 TABLET, FILM COATED ORAL at 04:56

## 2018-12-05 RX ADMIN — OXYCODONE HYDROCHLORIDE 5 MG: 5 TABLET ORAL at 17:39

## 2018-12-05 RX ADMIN — SODIUM CHLORIDE: 900 INJECTION INTRAVENOUS at 18:48

## 2018-12-05 RX ADMIN — ASPIRIN 81 MG: 81 TABLET, COATED ORAL at 01:52

## 2018-12-05 RX ADMIN — PREGABALIN 100 MG: 100 CAPSULE ORAL at 12:34

## 2018-12-05 RX ADMIN — HYDROXYCHLOROQUINE SULFATE 200 MG: 200 TABLET, FILM COATED ORAL at 20:08

## 2018-12-05 RX ADMIN — ACETAMINOPHEN 650 MG: 325 TABLET, FILM COATED ORAL at 18:47

## 2018-12-05 RX ADMIN — PIPERACILLIN SODIUM AND TAZOBACTAM SODIUM 2.25 G: 3; .375 INJECTION, POWDER, FOR SOLUTION INTRAVENOUS at 18:47

## 2018-12-05 RX ADMIN — ONDANSETRON HYDROCHLORIDE 4 MG: 2 INJECTION, SOLUTION INTRAMUSCULAR; INTRAVENOUS at 22:10

## 2018-12-05 RX ADMIN — OXYCODONE HYDROCHLORIDE AND ACETAMINOPHEN 500 MG: 500 TABLET ORAL at 04:56

## 2018-12-05 RX ADMIN — FERROUS SULFATE TAB 325 MG (65 MG ELEMENTAL FE) 325 MG: 325 (65 FE) TAB at 04:56

## 2018-12-05 RX ADMIN — ALTEPLASE 2 MG: 2.2 INJECTION, POWDER, LYOPHILIZED, FOR SOLUTION INTRAVENOUS at 12:34

## 2018-12-05 RX ADMIN — TRAZODONE HYDROCHLORIDE 50 MG: 50 TABLET ORAL at 20:09

## 2018-12-05 RX ADMIN — OMEPRAZOLE 20 MG: 20 CAPSULE, DELAYED RELEASE ORAL at 04:56

## 2018-12-05 RX ADMIN — SODIUM CHLORIDE: 9 INJECTION, SOLUTION INTRAVENOUS at 18:48

## 2018-12-05 RX ADMIN — ALTEPLASE 2 MG: 2.2 INJECTION, POWDER, LYOPHILIZED, FOR SOLUTION INTRAVENOUS at 09:31

## 2018-12-05 ASSESSMENT — COPD QUESTIONNAIRES
COPD SCREENING SCORE: 3
DO YOU EVER COUGH UP ANY MUCUS OR PHLEGM?: NO/ONLY WITH OCCASIONAL COLDS OR INFECTIONS
IN THE PAST 12 MONTHS DO YOU DO LESS THAN YOU USED TO BECAUSE OF YOUR BREATHING PROBLEMS: DISAGREE/UNSURE
HAVE YOU SMOKED AT LEAST 100 CIGARETTES IN YOUR ENTIRE LIFE: YES
DURING THE PAST 4 WEEKS HOW MUCH DID YOU FEEL SHORT OF BREATH: NONE/LITTLE OF THE TIME

## 2018-12-05 ASSESSMENT — COGNITIVE AND FUNCTIONAL STATUS - GENERAL
SUGGESTED CMS G CODE MODIFIER DAILY ACTIVITY: CH
CLIMB 3 TO 5 STEPS WITH RAILING: A LITTLE
WALKING IN HOSPITAL ROOM: A LITTLE
DAILY ACTIVITIY SCORE: 24
SUGGESTED CMS G CODE MODIFIER MOBILITY: CJ
MOBILITY SCORE: 22

## 2018-12-05 ASSESSMENT — PAIN SCALES - GENERAL
PAINLEVEL_OUTOF10: 7
PAINLEVEL_OUTOF10: 7
PAINLEVEL_OUTOF10: 4
PAINLEVEL_OUTOF10: 4
PAINLEVEL_OUTOF10: 5
PAINLEVEL_OUTOF10: 7

## 2018-12-05 ASSESSMENT — PATIENT HEALTH QUESTIONNAIRE - PHQ9
2. FEELING DOWN, DEPRESSED, IRRITABLE, OR HOPELESS: NOT AT ALL
1. LITTLE INTEREST OR PLEASURE IN DOING THINGS: NOT AT ALL
SUM OF ALL RESPONSES TO PHQ9 QUESTIONS 1 AND 2: 0

## 2018-12-05 ASSESSMENT — LIFESTYLE VARIABLES: EVER_SMOKED: YES

## 2018-12-05 NOTE — ED PROVIDER NOTES
ED Provider Note    Scribed for Mayo Pérez M.D. by Burton Holder. 12/4/2018  7:59 PM    Means of arrival: Walk-in  History obtained from: Patient  History limited by: None    CHIEF COMPLAINT  Chief Complaint   Patient presents with   • Abdominal Pain       HPI    Debby Carrizales is a 38 y.o. female presenting with lower abdominal pain onset 2 weeks ago, severely worsening today, with associated nausea, dry heaving, fever and loss of appetite. She describes her pain as crampy and rates it a 9/10 in severity. Her highest recorded fever was 103 °F. Patient also reports some mild chest pain. She denies any pain radiation. She is not experiencing diarrhea, dysuria, cough, vaginal discharge.     Patient regularly undergoes dialysis treatment after she had lupus. Her last dialysis treatment was 2 weeks ago, but she is supposed to go every other day and has not attended the appointments secondary to her abdominal pain. Her period started 10 days ago and she reports heavy bleeding at first which has since relieved. She has recently been on 3 L home O2.     REVIEW OF SYSTEMS  See HPI for further details.   Pertinent positives include: lower abdominal pain, nausea, dry heaving, fever, loss of appetite  Pertinent negative include: diarrhea, dysuria, cough, vaginal discharge  10 + review of systems otherwise negative     PAST MEDICAL HISTORY   has a past medical history of Arthritis; Avascular necrosis of bones of both hips (HCC) (10/10/2016); Clostridium difficile colitis (5/3/2011); Dialysis patient; ESBL (extended spectrum beta-lactamase) producing bacteria infection (8/25/2014); Fibromyalgia; Hypertension; Lupus; Pneumonia (); Psychiatric disorder; Pyelonephritis (11/21/2017); and Renal failure.    SOCIAL HISTORY  Social History     Social History Main Topics   • Smoking status: Former Smoker     Packs/day: 0.50     Years: 18.00     Types: Cigarettes     Quit date: 6/13/2011   • Smokeless tobacco:  Never Used      Comment: 1/2 ppd   • Alcohol use No   • Drug use: No       SURGICAL HISTORY   has a past surgical history that includes gastroscopy-endo (4/10/2011); sclerotheraphy (4/10/2011); gastroscopy-endo (4/18/2011); colonoscopy with biopsy (4/20/2011); gastroscopy-endo (4/27/2011); other (5/2011); other abdominal surgery; av fistula creation (9/9/2014); cath placement (9/9/2014); av fistula creation (11/14/2014); av fistula creation (2/3/2015); hip arthroplasty total (Right, 1/18/2016); closed reduction (Right, 7/5/2016); av fistula creation (Right, 7/12/2016); thrombectomy (Right, 8/20/2016); thrombectomy (Right, 8/21/2016); angioplasty balloon (8/21/2016); tracheostomy; av fistula creation (Right, 12/9/2017); thrombectomy (12/9/2017); av fistula revision (Right, 8/11/2018); and av fistula thrombolysis (Right, 8/11/2018).    CURRENT MEDICATIONS  Home Medications     Reviewed by Kasia Posey (Pharmacy Tech) on 12/04/18 at 2257  Med List Status: Complete   Medication Last Dose Status   ascorbic acid (ASCORBIC ACID) 500 MG Tab 11/30/2018 Active   buPROPion (WELLBUTRIN XL) 150 MG XL tablet 11/30/2018 Active   cholecalciferol (VITAMIN D3) 5000 UNIT Cap 11/30/2018 Active   ferrous sulfate 325 (65 FE) MG tablet 11/30/2018 Active   hydroxychloroquine (PLAQUENIL) 200 MG Tab 11/30/2018 Active   omeprazole (PRILOSEC) 20 MG delayed-release capsule 11/30/2018 Active   pregabalin (LYRICA) 100 MG Cap 11/30/2018 Active   sevelamer carbonate (RENVELA) 800 MG Tab tablet 11/30/2018 Active   tizanidine (ZANAFLEX) 4 MG Tab 11/30/2018 Active   trazodone (DESYREL) 50 MG Tab 11/30/2018 Active                ALLERGIES  Allergies   Allergen Reactions   • Lorazepam [Ativan] Anxiety     Patient becomes severely paranoid and agitated   • Morphine Itching     Tolerates Dilaudid   • Seasonal Runny Nose and Itching     Hay fever, sabiha brush       PHYSICAL EXAM  VITAL SIGNS: /97   Pulse (!) 106   Temp 37.6 °C (99.7 °F)  "(Tympanic)   Resp 20   Ht 1.651 m (5' 5\")   Wt 72.6 kg (160 lb)   LMP 12/04/2018   BMI 26.63 kg/m²    SpO2: I interpret this pulse oximetry as normal  Constitutional: Well developed, Well nourished, Mild distress, Non-toxic appearance.   HENT: Normocephalic, Atraumatic, Bilateral external ears normal, Oropharynx moist, No oral exudates.   Eyes: PERRLA, EOMI, Conjunctiva normal, No discharge.   Neck: No tenderness, Supple, No stridor.   Lymphatic: No lymphadenopathy noted.   Cardiovascular: Normal heart rate, Normal rhythm.   Thorax & Lungs: Clear to auscultation bilaterally, No respiratory distress, No wheezing, No crackles.   Abdomen: Soft, right lower quadrant tenderness, No masses, No pulsatile masses.   Skin: Warm, Dry, No erythema, No rash.   Extremities:, No edema No cyanosis.   Musculoskeletal: No tenderness to palpation or major deformities noted.  Intact distal pulses  Neurologic: Awake, alert. Moves all extremities spontaneously.  Psychiatric: Affect normal, Judgment normal, Mood normal.     DIAGNOSTIC STUDIES / PROCEDURES    EKG  EKG Interpretation:  Interpreted by me    Rhythm:  Normal sinus rhythm   Rate: 99  Axis: normal  No ST or T wave abnormalities  Relevant clinical issues: End-stage renal disease  Clinical Impression: Normal EKG without acute changes      LABORATORY  Results for orders placed or performed during the hospital encounter of 12/04/18   COMP METABOLIC PANEL   Result Value Ref Range    Sodium 133 (L) 135 - 145 mmol/L    Potassium 6.2 (H) 3.6 - 5.5 mmol/L    Chloride 98 96 - 112 mmol/L    Co2 8 (LL) 20 - 33 mmol/L    Anion Gap 27.0 (H) 0.0 - 11.9    Glucose 68 65 - 99 mg/dL    Bun 157 (HH) 8 - 22 mg/dL    Creatinine 20.40 (HH) 0.50 - 1.40 mg/dL    Calcium 8.1 (L) 8.5 - 10.5 mg/dL    AST(SGOT) 14 12 - 45 U/L    ALT(SGPT) 9 2 - 50 U/L    Alkaline Phosphatase 62 30 - 99 U/L    Total Bilirubin 0.5 0.1 - 1.5 mg/dL    Albumin 3.6 3.2 - 4.9 g/dL    Total Protein 7.0 6.0 - 8.2 g/dL    " Globulin 3.4 1.9 - 3.5 g/dL    A-G Ratio 1.1 g/dL   CBC WITH DIFFERENTIAL   Result Value Ref Range    WBC 10.0 4.8 - 10.8 K/uL    RBC 3.12 (L) 4.20 - 5.40 M/uL    Hemoglobin 9.4 (L) 12.0 - 16.0 g/dL    Hematocrit 29.1 (L) 37.0 - 47.0 %    MCV 93.3 81.4 - 97.8 fL    MCH 30.1 27.0 - 33.0 pg    MCHC 32.3 (L) 33.6 - 35.0 g/dL    RDW 47.3 35.9 - 50.0 fL    Platelet Count 80 (L) 164 - 446 K/uL    MPV 12.4 9.0 - 12.9 fL    Neutrophils-Polys 85.50 (H) 44.00 - 72.00 %    Lymphocytes 6.90 (L) 22.00 - 41.00 %    Monocytes 5.30 0.00 - 13.40 %    Eosinophils 0.50 0.00 - 6.90 %    Basophils 0.40 0.00 - 1.80 %    Immature Granulocytes 1.40 (H) 0.00 - 0.90 %    Nucleated RBC 0.00 /100 WBC    Neutrophils (Absolute) 8.59 (H) 2.00 - 7.15 K/uL    Lymphs (Absolute) 0.69 (L) 1.00 - 4.80 K/uL    Monos (Absolute) 0.53 0.00 - 0.85 K/uL    Eos (Absolute) 0.05 0.00 - 0.51 K/uL    Baso (Absolute) 0.04 0.00 - 0.12 K/uL    Immature Granulocytes (abs) 0.14 (H) 0.00 - 0.11 K/uL    NRBC (Absolute) 0.00 K/uL   TROPONIN   Result Value Ref Range    Troponin I 0.24 (H) 0.00 - 0.04 ng/mL   LIPASE   Result Value Ref Range    Lipase 23 11 - 82 U/L   BTYPE NATRIURETIC PEPTIDE   Result Value Ref Range    B Natriuretic Peptide >5000 (H) 0 - 100 pg/mL   HCG QUANTITATIVE SERUM   Result Value Ref Range    Bhcg 0.9 0.0 - 5.0 mIU/mL   Influenza A/B By PCR   Result Value Ref Range    Influenza virus A RNA Negative Negative    Influenza virus B, PCR Negative Negative   ESTIMATED GFR   Result Value Ref Range    GFR If  2 (A) >60 mL/min/1.73 m 2    GFR If Non African American 2 (A) >60 mL/min/1.73 m 2   EKG (Now)   Result Value Ref Range    Report       [facility] Emergency Dept.    Test Date:  2018  Pt Name:    TAYLOR WARD               Department: ER  MRN:        [id number]                      Room:       [facility]  Gender:     Female                       Technician: [id number]  :        1980                   Requested  By:UYEN BLANCA  Order #:    [id number]                    Reading MD:    Measurements  Intervals                                Axis  Rate:       99                           P:          61  NV:         156                          QRS:        -6  QRSD:       92                           T:          111  QT:         344  QTc:        442    Interpretive Statements  SINUS RHYTHM  ABNORMAL T, CONSIDER ISCHEMIA, LATERAL LEADS  Compared to ECG 11/26/2018 09:14:03  T-wave abnormality now present  Possible ischemia now present          RADIOLOGY    DX-CHEST-PORTABLE (1 VIEW)   Final Result      Enlarged cardiac silhouette with interstitial prominence consistent with pulmonary edema with probable left pleural fluid.      CT-ABDOMEN-PELVIS W/O    (Results Pending)       Chest XR, 1 view (my read): Cardiomegaly with mild pulmonary edema, no focal infiltrate.  Normal mediastinum. No prior films were immediately available for comparison.  Impression: Pulmonary edema    CHART REVIEW  Pertinent medical chart information was reviewed and reveals: Recent visit for shortness of breath, thought to be fluid overload, discharged home    COURSE & MEDICAL DECISION MAKING  Pertinent Labs & Imaging studies reviewed. (See chart for details)    Differential diagnoses include but not limited to: renal failure, electrolyte abnormality, ovarian torsion, ovarian cyst, ectopic pregnancy, dysfunctional uterine bleeding, appendicitis, colitis, fluid overload, influenza, viral illness, gastroenteritis, dehydration     7:59 PM - Patient seen and examined at bedside. Discussed plan of care, including a lab work up and imaging. Patient agrees to the plan of care. The patient will be medicated with Zofran 4 mg IV and dilaudid 1 mg IV. Ordered for DX chest, CMP, CBC, troponin, lipase, BNP, blood culture, urinalysis, urine culture, and EKG to evaluate her symptoms.     9:20 PM Paged for nephrology.    9:31 PM Consulted with Dr. Najjar, nephrology,  regarding the above case findings. He recommends admission with dialysis treatment tomorrow morning. He advises against using contrast if CT is conducted. Ordered for CT abdomen-pelvis.     9:50 PM Consulted with Dr. Stone, hospitalist, regarding the above case findings. He will admit.     Vitals:    12/04/18 1909 12/04/18 1911 12/04/18 2100 12/04/18 2239   BP:  160/97     Pulse: (!) 106  96 96   Resp: 20      Temp: 37.6 °C (99.7 °F)      TempSrc: Tympanic      SpO2:   95% 95%   Weight:       Height:           Medications   sodium polystyrene (KAYEXALATE) 15 GM/60ML suspension 30 g (not administered)   ondansetron (ZOFRAN) syringe/vial injection 4 mg (4 mg Intravenous Given 12/4/18 2016)   HYDROmorphone pf (DILAUDID) injection 1 mg (1 mg Intravenous Given 12/4/18 2016)     38-year-old female with history of SLE and end-stage renal disease, every other day dialysis, presenting for abdominal pain and nausea vomiting.  Uses intermittent home O2, requiring constant oxygen now.  Slightly tachycardic on initial fires, otherwise vital signs reassuring, exam without any focal findings.  Initial EKG without any signs of significant hyperkalemia or other concerning findings.  Labs show mild hyperkalemia of 6.2, significant acidosis, markedly elevated BUN and creatinine.  Without EKG changes and significant hyperkalemia, hyperkalemic treatment deferred for likely impending dialysis.  Chest x-ray with signs of pulmonary edema, patient appears without any respiratory distress at this time.  Did not feel she needs emergent dialysis but will need dialysis soon.  Consulted nephrology, recommend dialysis in the morning after admission, also advised CT without contrast if evolving abdominal pain.  CT ordered and is pending at this time.  Patient without any other acute GI/ complaints.  No evidence of sepsis.  No focal abdominal tenderness concerning for cholecystitis, appendicitis, diverticulitis.  Possibly related to uremia or  gastroenteritis or constipation as she has not had a bowel movement in 2 weeks.  Patient remains hemodynamically stable and appears comfortable.  Admitted to medicine.    DISPOSITION  DISPOSITION:  Patient will be admitted to Dr. Stone in guarded condition.     FINAL IMPRESSION  Visit Diagnoses     ICD-10-CM   1. Uremia N19   2. ESRD (end stage renal disease) (HCC) N18.6   3. Lower abdominal pain R10.30        Burton BARGER (Scribe), am scribing for, and in the presence of, Mayo Pérez M.D..    Electronically signed by: Burton Holder (Scribe), 12/4/2018    IMayo M.D. personally performed the services described in this documentation, as scribed by Burton Holder in my presence, and it is both accurate and complete. C.    The note accurately reflects work and decisions made by me.  Mayo Pérez  12/4/2018  11:20 PM

## 2018-12-05 NOTE — PROGRESS NOTES
HEMODIALYSIS NOTES:     HD today x 3 hours per Dr. Najjar. Initiated at 0741 and ended at 1056. Patient feels nauseated and generalized pain. Primary RN aware and gave medication. Patient had 2 BM. Patient  tolerated treatment. Please see paper flowsheet for details.    UF Net: 3,000 mL    Blood returned. Applied gauze and held R AVG site for 10 minutes. Verified no bleeding. Bruit and thrill present post dialysis. Instructions given to Primary RN that if bleeding occurs on the AVF site, change dressing and held the site with pressure.     Report given to LILLIANA Shepard RN.

## 2018-12-05 NOTE — CARE PLAN
"Problem: Safety  Goal: Will remain free from injury  Outcome: PROGRESSING AS EXPECTED  Fall precautions in place. Bed in lowest position. Non-skid socks in place. Personal possessions within reach. Mobility sign on door. Bed-alarm on. Call light within reach. Pt educated regarding fall prevention and states understanding.    Problem: Bowel/Gastric:  Goal: Normal bowel function is maintained or improved  Outcome: PROGRESSING AS EXPECTED  Pt states last BM was \"2 weeks ago\". Stool softeners given and had BM today 12/5      "

## 2018-12-05 NOTE — CONSULTS
DATE OF SERVICE:  12/05/2018    REQUESTING PHYSICIAN:  Dr. Pérez from the emergency room service.    REASON FOR CONSULTATION:  Management of hyperkalemia in the setting of   end-stage renal disease and assessing the need for urgent dialysis.    The patient seen and examined, medical records were reviewed.    HISTORY OF PRESENT ILLNESS:  The patient is an unfortunate 38-year-old lady   who is very well known to my service.  She has a history of end-stage renal   disease secondary to lupus nephropathy, who undergoes hemodialysis at [facility] 3 times a week.  The patient also has a history of noncompliance.    She has not been to dialysis for about 2 weeks, because she has not been   well.  She presented to the hospital last evening with abdominal pain,   decreased appetite, nausea, and vomiting.  Patient was found to be uremic,   hyperkalemic as well as volume overloaded.  She was admitted.  We were called   to manage her kidney disease, assessing the need for dialysis.    The patient has no fever, no chills, and no recent use of NSAIDs.    PAST MEDICAL HISTORY:  Significant for:  1.  End-stage renal disease.  2.  Chronic pain.  3.  Lupus.  4.  Hypertension.  5.  Anemia.    ALLERGIES:  The list was reviewed.    MEDICATIONS:  Reviewed.    SOCIAL HISTORY:  The patient continued to smoke.    FAMILY HISTORY:  No known renal disease.    REVIEW OF SYSTEMS:  Patient is very weak.  All other review of systems was   negative except outlined in the history of present illness.    PHYSICAL EXAMINATION:  GENERAL:  Patient looks ill, but in no apparent distress.  VITAL SIGNS:  Showed blood pressure of 160/97, heart rate was 95, and   respiratory rate was ____.  HEENT:  Normocephalic, atraumatic.  Sclerae is anicteric.  Pupils are   reactive.  Nose is normal.  Mucous membrane is moist.  NECK:  No lymphadenopathy.  No JVD.  No thyroid mass.  CHEST:  Normal.  LUNGS:  Few rales at the bases.  HEART:  S1, S2.  ABDOMEN:   Soft, nontender.  No hepatosplenomegaly.  There is no inguinal   lymphadenopathy.  EXTREMITIES:  There is +1 edema.  SKIN:  No skin rashes.  NEUROLOGIC:  Patient is alert and oriented x3.  MOOD:  The patient is anxious.    LABORATORY DATA:  Her recent labs from today were reviewed.  She also had a   chest x-ray from yesterday, which I reviewed the image myself, showed   cardiomegaly and pulmonary edema.    ASSESSMENT:  The patient is a 38-year-old lady with multiple medical problems:  1.  End-stage renal disease.  2.  Hyperkalemia.  3.  Respiratory failure.  4.  Volume overload.  5.  Uncontrolled hypertension.  6.  Severe metabolic acidosis.  7.  Anemia.    PLAN:  1.  We will plan on urgent dialysis to manage her uremia, hyperkalemia, and   respiratory failure.  We will plan another dialysis tomorrow to optimize her   uremia and volume overload.  2.  Renal diet.  3.  Renal dose all medications.  4.  Avoid nephrotoxins.  5.  Prognosis is guarded.    Plan discussed in detail with Dr. Pérez.       ____________________________________     FADI NAJJAR, MD FN / ISSAC    DD:  12/05/2018 12:02:23  DT:  12/05/2018 13:47:11    D#:  [id number]  Job#:  [id number]

## 2018-12-05 NOTE — H&P
ADMISSION DATE:  12/04/2018    HISTORY OF PRESENT ILLNESS:  This is a 38-year-old female who had come to the   emergency room, with her grandmother by her side with a complaint of abdominal   pain.  The patient states that she has had cramping abdominal pain for the   past few weeks.  Patient states that she has been constipated, has not had   much problem with it.  Also, states has had nausea and vomiting from time to   time.  Patient also denies any fever.  Denies any chills.  Patient also states   that she has not felt good in the past 2 weeks.  Patient also admits not   going to dialysis since she was not feeling good and staying at home.  She has   missed her dialysis as well.  The patient states that she has had some chills   and to me she stated that she is not sure about fever, but according to the   ER physician she had stated that she had fever at home as well.  Patient has   also denies any respiratory symptoms.    PAST MEDICAL HISTORY:  He has a past medical history of end-stage renal   disease, on dialysis and C. diff colitis, ESBL, fibromyalgia, hypertension,   lupus, pyelonephritis.    SOCIAL HISTORY:  Admits to smoking, denies any alcohol or drug use.    PAST SURGICAL HISTORY:  Has had colonoscopy with biopsy.  Also AV fistula   placement, and thrombectomy, angioplasty, tracheostomy.    MEDICATIONS:  On day of admission, medications are omeprazole 20 mg p.o.   daily, Lyrica 100 mg p.o. 3 times a day, Renvela is 2400 mg p.o. 3 times a   day, ferrous sulfate is 325 mg p.o. daily, vitamin C daily, Wellbutrin-   mg p.o. daily, Plaquenil is 200 mg p.o. at bedtime, vitamin D3 10,000 units   p.o. daily, trazodone 50 mg p.o. at bedtime, and Zanaflex 4 mg p.o. at   bedtime.    REVIEW OF SYSTEMS:  All 14 systems reviewed, all of them were negative except   what I mentioned in the history of present illness.    PHYSICAL EXAMINATION:  VITAL SIGNS:  Temperature of 37, pulse of 96, respiratory rate of 20,  blood   pressure of 140/90, O2 saturation is 95%.  GENERAL APPEARANCE:  Patient is awake, alert, oriented x3, obese.  NEUROLOGICAL:  Cranial nerves II-XII intact.  HEAD AND NECK:  Supple.  Oral cavity is dry.  CARDIOVASCULAR:  S1, S2, regular.  LUNGS:  Clear bilaterally, no wheezing or crackles.  ABDOMEN:  Obese, soft, mild tenderness noted diffusely with deep palpation.    No rebound or guarding noted.  Bowel sounds are decreased, but present.  EXTREMITIES:  Lower extremity, no edema or rash noted.    LABORATORY DATA:  WBC of 10,000, H and H 9.4 and 29.1, platelets of 80,000.    Sodium is 133, potassium 6.2, chloride of 98, bicarb of 8, glucose 68, BUN of   157, creatinine of 20.  Troponin is 0.24.    IMAGING:  A chest x-ray done and has enlarged cardiac silhouette with   interstitial prominence consistent with pulmonary edema with probable left   pleural fluid.  CT of abdomen and pelvis was done.  Impression is interstitial   prominence in lung bases with pleural effusions consistent with pulmonary   edema, anasarca and trace ascites.  No evidence of acute abdominal or pelvic   pathology.    ASSESSMENT AND PLAN:  This is a 38-year-old female with:  1.  Abdominal pain.  1.  Most likely secondary to constipation.  2.  Patient received Kayexalate.  3.  Bowel regimen.  If these do not work, then the patient needs to get   magnesium citrate.  2.  For hyperkalemia:  1.  Kayexalate.  2.  EKG is noted and the EKG shows sinus rhythm, rate of 99, rhythm is   regular, axis is left axis deviation, no peaked T waves are noted.  3.  Also the nephrologist has been consulted for the patient.  4.  Check potassium level in the morning.  3.  End-stage renal disease:  1.  Nephrology has been consulted.  2.  Patient has missed dialysis.  4.  For elevated troponin:  1.  Patient does not have any chest pain.  2.  Check troponin in 6 hours.  3.  Aspirin.  5.  For thrombocytopenia:  1.  We will observe at this time.  2.  Check CBC.  3.   Patient most likely need hematology consultation as an outpatient.       ____________________________________     MD CLIVE Paulino / ISSAC    DD:  12/05/2018 00:31:27  DT:  12/05/2018 01:45:33    D#:  [id number]  Job#:  [id number]

## 2018-12-05 NOTE — PROGRESS NOTES
Pt has left chest port, appears to have clotted off. MD notified and alteplase 2mg ordered, 2nd dose available if needed

## 2018-12-05 NOTE — ED NOTES
Med rec updated and complete.  Allergies reviewed.  Pt denies antibiotic use in last 30 days. Pt  Reports not taking any of her medications   Since 11/30/18.

## 2018-12-05 NOTE — PROGRESS NOTES
2RN skin check completed with JOSETTE Valles. Ulcer/open sore noted to right buttocks. Picture taken and uploaded in patients record/chart. Scattered scabs to face, multiple healed scars to arms and abdominal area. AV fistula to right arm. All other skin intact.

## 2018-12-05 NOTE — PROGRESS NOTES
1345: Chest port still unable to flush/draw back blood. S/p alteplase 2mg x2. MD Ho notified     1415: Order received from MD to deaccess and then reaccess chest port

## 2018-12-05 NOTE — DISCHARGE PLANNING
OUTPATIENT HEMODIALYSIS NOTE:    Confirmed patient is active at:    [facility]  [address]   [place] [address]    Schedule: Monday, Wednesday, Friday  Time: 3:45 pm    Spoke with Lashawn at facility who confirmed.    Forwarded records for review.

## 2018-12-05 NOTE — ED NOTES
"Patient wheeled to bathroom. Urine collected and sent. Chest xray at bedside. Placed back on monitor. States her nausea and pain \"is better.\" MD Ventura made aware of labs and critical of Co2 8. 2nd set of blood cx drawn and sent. No needs at this time, will CTM.   "

## 2018-12-05 NOTE — PROCEDURES
Pt with ESRD, presented with weakness, hyperkalemia, uremia.  Pt is doing better after starting HD.  Seen and examined while getting HD.  Continue daily HD to manage uremia, volume overload

## 2018-12-05 NOTE — ED TRIAGE NOTES
Patient presents to ED via EMS with c/o abdominal pain/ n/v x 1 week. Patient states constipation x 3 weeks. She states she has missed dialysis x 2 weeks. A&Ox4 upon arrival to ED.

## 2018-12-05 NOTE — PROGRESS NOTES
Bedside report received from Alvaro FINCH. Patient's chart and MAR reviewed. 12 hour chart check complete. Pt is awake in bed. Pt is AOx4. Patient was updated on plan of care for the day. Questions answered and concerns addressed.  Pt denies any additional needs at this time. White board updated. Call light and personal belongings within reach, bed in lowest position.

## 2018-12-05 NOTE — ED NOTES
"Patient aox4, states she receives HD MWF , unable to get her dialysis x2 weeks due to n/v. \"haven't been able to keep anything down.\" Emesis bag at bedside, no vomiting at this time. SpO2 noted to be 88% on room air, states she uses oxygen \"sometimes at home.\" 2L nC applied. States she has not had a BM \"in 2 weeks.\" Power port accessed. MD at bedside for eval. Call bell within reach, will CTM.   "

## 2018-12-05 NOTE — CARE PLAN
Problem: Safety  Goal: Will remain free from injury  Outcome: PROGRESSING AS EXPECTED  Patient will remain free of falls/injuries during this hospital admission

## 2018-12-05 NOTE — ASSESSMENT & PLAN NOTE
Most likley 2nd to constipation, uremic gastritis  kayexelate is given to the pt  Bowel regiemen  Mag citrate if above treatment not successful  Ct of abd and pelvis showed:Interstitial prominence in lung bases with pleural effusions consistent with pulmonary edema.    Anasarca and trace ascites.    No evidence of acute abdominal or pelvic pathology.        Continue omeprazole, sucralfate added    Improved

## 2018-12-06 PROBLEM — K29.70 GASTRITIS: Status: ACTIVE | Noted: 2018-12-06

## 2018-12-06 PROCEDURE — 5A1D70Z PERFORMANCE OF URINARY FILTRATION, INTERMITTENT, LESS THAN 6 HOURS PER DAY: ICD-10-PCS | Performed by: INTERNAL MEDICINE

## 2018-12-06 PROCEDURE — A9270 NON-COVERED ITEM OR SERVICE: HCPCS | Performed by: FAMILY MEDICINE

## 2018-12-06 PROCEDURE — 90935 HEMODIALYSIS ONE EVALUATION: CPT

## 2018-12-06 PROCEDURE — 700102 HCHG RX REV CODE 250 W/ 637 OVERRIDE(OP): Performed by: FAMILY MEDICINE

## 2018-12-06 PROCEDURE — 700111 HCHG RX REV CODE 636 W/ 250 OVERRIDE (IP): Performed by: FAMILY MEDICINE

## 2018-12-06 PROCEDURE — 90935 HEMODIALYSIS ONE EVALUATION: CPT | Performed by: INTERNAL MEDICINE

## 2018-12-06 PROCEDURE — 700111 HCHG RX REV CODE 636 W/ 250 OVERRIDE (IP): Performed by: INTERNAL MEDICINE

## 2018-12-06 PROCEDURE — 700105 HCHG RX REV CODE 258: Performed by: INTERNAL MEDICINE

## 2018-12-06 PROCEDURE — A9270 NON-COVERED ITEM OR SERVICE: HCPCS | Performed by: INTERNAL MEDICINE

## 2018-12-06 PROCEDURE — 99232 SBSQ HOSP IP/OBS MODERATE 35: CPT | Performed by: INTERNAL MEDICINE

## 2018-12-06 PROCEDURE — 770001 HCHG ROOM/CARE - MED/SURG/GYN PRIV*

## 2018-12-06 PROCEDURE — 700102 HCHG RX REV CODE 250 W/ 637 OVERRIDE(OP): Performed by: INTERNAL MEDICINE

## 2018-12-06 RX ORDER — SUCRALFATE 1 G/1
1 TABLET ORAL EVERY 6 HOURS
Status: DISCONTINUED | OUTPATIENT
Start: 2018-12-06 | End: 2018-12-17 | Stop reason: HOSPADM

## 2018-12-06 RX ORDER — PREGABALIN 100 MG/1
100 CAPSULE ORAL DAILY
Status: DISCONTINUED | OUTPATIENT
Start: 2018-12-07 | End: 2018-12-12

## 2018-12-06 RX ORDER — AMLODIPINE BESYLATE 10 MG/1
5 TABLET ORAL
Status: DISCONTINUED | OUTPATIENT
Start: 2018-12-07 | End: 2018-12-14

## 2018-12-06 RX ORDER — OXYCODONE HYDROCHLORIDE 5 MG/1
5-10 TABLET ORAL EVERY 4 HOURS PRN
Status: DISCONTINUED | OUTPATIENT
Start: 2018-12-06 | End: 2018-12-12

## 2018-12-06 RX ADMIN — ONDANSETRON HYDROCHLORIDE 4 MG: 2 INJECTION, SOLUTION INTRAMUSCULAR; INTRAVENOUS at 16:24

## 2018-12-06 RX ADMIN — OXYCODONE HYDROCHLORIDE AND ACETAMINOPHEN 500 MG: 500 TABLET ORAL at 04:45

## 2018-12-06 RX ADMIN — BUPROPION HYDROCHLORIDE 150 MG: 150 TABLET, EXTENDED RELEASE ORAL at 04:47

## 2018-12-06 RX ADMIN — OXYCODONE HYDROCHLORIDE 10 MG: 5 TABLET ORAL at 17:21

## 2018-12-06 RX ADMIN — OXYCODONE HYDROCHLORIDE 5 MG: 5 TABLET ORAL at 12:14

## 2018-12-06 RX ADMIN — OXYCODONE HYDROCHLORIDE 5 MG: 5 TABLET ORAL at 04:44

## 2018-12-06 RX ADMIN — OMEPRAZOLE 20 MG: 20 CAPSULE, DELAYED RELEASE ORAL at 04:45

## 2018-12-06 RX ADMIN — ONDANSETRON HYDROCHLORIDE 4 MG: 2 INJECTION, SOLUTION INTRAMUSCULAR; INTRAVENOUS at 08:00

## 2018-12-06 RX ADMIN — HYDROXYCHLOROQUINE SULFATE 200 MG: 200 TABLET, FILM COATED ORAL at 21:26

## 2018-12-06 RX ADMIN — OXYCODONE HYDROCHLORIDE 10 MG: 5 TABLET ORAL at 21:26

## 2018-12-06 RX ADMIN — SEVELAMER CARBONATE 2400 MG: 800 TABLET, FILM COATED ORAL at 12:14

## 2018-12-06 RX ADMIN — ONDANSETRON HYDROCHLORIDE 4 MG: 2 INJECTION, SOLUTION INTRAMUSCULAR; INTRAVENOUS at 04:44

## 2018-12-06 RX ADMIN — SODIUM CHLORIDE: 900 INJECTION INTRAVENOUS at 14:19

## 2018-12-06 RX ADMIN — PIPERACILLIN SODIUM AND TAZOBACTAM SODIUM 2.25 G: 3; .375 INJECTION, POWDER, FOR SOLUTION INTRAVENOUS at 04:46

## 2018-12-06 RX ADMIN — PIPERACILLIN SODIUM AND TAZOBACTAM SODIUM 2.25 G: 3; .375 INJECTION, POWDER, FOR SOLUTION INTRAVENOUS at 14:19

## 2018-12-06 RX ADMIN — PREGABALIN 75 MG: 75 CAPSULE ORAL at 04:45

## 2018-12-06 RX ADMIN — PROMETHAZINE HYDROCHLORIDE 12.5 MG: 25 TABLET ORAL at 17:24

## 2018-12-06 RX ADMIN — VITAMIN D, TAB 1000IU (100/BT) 1000 UNITS: 25 TAB at 04:45

## 2018-12-06 RX ADMIN — TRAZODONE HYDROCHLORIDE 50 MG: 50 TABLET ORAL at 21:26

## 2018-12-06 RX ADMIN — FERROUS SULFATE TAB 325 MG (65 MG ELEMENTAL FE) 325 MG: 325 (65 FE) TAB at 04:45

## 2018-12-06 RX ADMIN — SUCRALFATE 1 G: 1 TABLET ORAL at 17:36

## 2018-12-06 RX ADMIN — SEVELAMER CARBONATE 2400 MG: 800 TABLET, FILM COATED ORAL at 07:58

## 2018-12-06 ASSESSMENT — ENCOUNTER SYMPTOMS
CONSTIPATION: 1
FEVER: 0
HEMOPTYSIS: 0
BACK PAIN: 0
DIZZINESS: 0
HEARTBURN: 0
BRUISES/BLEEDS EASILY: 0
DEPRESSION: 0
HEADACHES: 0
ORTHOPNEA: 0
CHILLS: 0
DIARRHEA: 0
COUGH: 0
TINGLING: 0
NECK PAIN: 0
PALPITATIONS: 0
POLYDIPSIA: 0
VOMITING: 0
PHOTOPHOBIA: 0
DOUBLE VISION: 0
BLURRED VISION: 0
WEIGHT LOSS: 0
MYALGIAS: 0
NAUSEA: 1
SPUTUM PRODUCTION: 0
ABDOMINAL PAIN: 1

## 2018-12-06 ASSESSMENT — PAIN SCALES - GENERAL
PAINLEVEL_OUTOF10: 7
PAINLEVEL_OUTOF10: 8
PAINLEVEL_OUTOF10: 0

## 2018-12-06 ASSESSMENT — LIFESTYLE VARIABLES: SUBSTANCE_ABUSE: 0

## 2018-12-06 NOTE — PROGRESS NOTES
1615: Chest port deaccessed and EMLA cream applied. Will re-access port    1830: Port accessed with brisk blood return. Heparin locked per protocol

## 2018-12-06 NOTE — CARE PLAN
Problem: Safety  Goal: Will remain free from injury  Outcome: PROGRESSING AS EXPECTED  Pt educated on POC and medications. Pt verbalized understanding.     Problem: Infection  Goal: Will remain free from infection  Outcome: PROGRESSING AS EXPECTED  Assessed pt for signs and symptoms of infection. Handwashing performed before and after pt encounter. Scrubbed the hub for 10-15 seconds before administering IV medications.

## 2018-12-06 NOTE — CARE PLAN
Problem: Safety  Goal: Will remain free from falls  Outcome: PROGRESSING AS EXPECTED  Patient will remain free from fall during this shift.  Plan of care discussed with patient. Bed alarm active, call light within reach and patient encouraged to notify staff if assistance is needed.    Problem: Pain Management  Goal: Pain level will decrease to patient's comfort goal  Outcome: PROGRESSING SLOWER THAN EXPECTED  Patient will have decreased c/o pain during this shift.  Pain will be assessed every four hours, patient is aware to notify nurse of pain between assessment and alternative pain relieving methods will be evaluated.

## 2018-12-06 NOTE — PROGRESS NOTES
Assumed care of patient bedside report received from JOSETTE Cash updated on POC, call light within reach and fall precautions in place. Bed locked and in lowest position. Patient instructed to call for assistance before getting out of bed. All questions answered, no other needs at this time.

## 2018-12-06 NOTE — PROGRESS NOTES
Report received from JOSETTE Plaza. Assumed care of patient. Updated patient on plan of care. Fall precautions in place and call light within reach.

## 2018-12-06 NOTE — ASSESSMENT & PLAN NOTE
Probably metabolic   Current level of uremia could cause encephalopathy  Infection could cause encephalopathy.  She was started on Zosyn IV for UTI       Back to baseline

## 2018-12-06 NOTE — PROGRESS NOTES
[facility] Medicine Daily Progress Note    Date of Service  12/5/2018    Chief Complaint  38 y.o. female history of end-stage renal disease on dialysis, C. difficile colitis, ESBL, fibromyalgia, hypertension, lupus, pyelonephritis admitted 12/4/2018 with complaints of abdominal pain, constipation, missed hemodialysis, uremia, hyperkalemia    Interval Problem Update  Patient underwent urgent hemodialysis, -3000 cc.  Currently her she is very lethargic.  Unable to provide much history.  Denies any current pain.  Per nephrology, plan to continue daily hemodialysis until improvement in uremia  Ordered repeat blood work to follow-up with hyperkalemia after hemodialysis  She has mild fever  Temperature with T-max up to 100.3.   UA positive for an infection.  Blood culture collected on admission.  Start Zosyn in the light of history of ESBL    Consultants/Specialty  Nephrology    Code Status  Full code    Disposition  To be discussed    Review of Systems  Review of Systems   Unable to perform ROS: Mental status change        Physical Exam  Temp:  [36.2 °C (97.1 °F)-37.9 °C (100.3 °F)] 37.9 °C (100.2 °F)  Pulse:  [] 115  Resp:  [16-20] 18  BP: ()/() 158/91    Physical Exam   Constitutional: She appears well-developed. She appears lethargic. She has a sickly appearance.   HENT:   Head: Normocephalic and atraumatic.   Eyes: Pupils are equal, round, and reactive to light. EOM are normal.   Neck: Normal range of motion. Neck supple.   Cardiovascular: Normal rate.    Pulmonary/Chest: Effort normal and breath sounds normal. No respiratory distress.   Abdominal: Soft. Bowel sounds are normal. There is no tenderness.   Musculoskeletal: Normal range of motion.   Neurological: She appears lethargic.   Skin: Skin is warm and dry.   Nursing note and vitals reviewed.      Fluids    Intake/Output Summary (Last 24 hours) at 12/05/18 1729  Last data filed at 12/05/18 1100   Gross per 24 hour   Intake              500 ml    Output             3600 ml   Net            -3100 ml       Laboratory  Recent Labs      12/04/18 1941   WBC  10.0   RBC  3.12*   HEMOGLOBIN  9.4*   HEMATOCRIT  29.1*   MCV  93.3   MCH  30.1   MCHC  32.3*   RDW  47.3   PLATELETCT  80*   MPV  12.4     Recent Labs      12/04/18 1941   SODIUM  133*   POTASSIUM  6.2*   CHLORIDE  98   CO2  8*   GLUCOSE  68   BUN  157*   CREATININE  20.40*   CALCIUM  8.1*         Recent Labs      12/04/18 1941   BNPBTYPENAT  >5000*           Imaging  CT-ABDOMEN-PELVIS W/O   Final Result         Interstitial prominence in lung bases with pleural effusions consistent with pulmonary edema.      Anasarca and trace ascites.      No evidence of acute abdominal or pelvic pathology.      DX-CHEST-PORTABLE (1 VIEW)   Final Result      Enlarged cardiac silhouette with interstitial prominence consistent with pulmonary edema with probable left pleural fluid.           Assessment/Plan  Encephalopathy   Assessment & Plan      Probably metabolic   Current level of uremia could cause encephalopathy  Infection could cause encephalopathy.  She was started on Zosyn IV for UTI  Check UDS and ammonia     ESRD (end stage renal disease) on dialysis (HCC)- (present on admission)   Assessment & Plan    Nephrology is consulted by ed physician for dialysis  Has missed her routine dialysis    Want to continue daily hemodialysis until improvement in uremia     UTI (urinary tract infection)- (present on admission)   Assessment & Plan    History of ESBL  Positive UA  Started on Zosyn  Follow with urine culture     AP (abdominal pain)- (present on admission)   Assessment & Plan    Most likley 2nd to constipation, uremic gastritis  kayexelate is given to the pt  Bowel regiemen  Mag citrate if above treatment not successful  Ct of abd and pelvis showed:Interstitial prominence in lung bases with pleural effusions consistent with pulmonary edema.    Anasarca and trace ascites.    No evidence of acute abdominal or  pelvic pathology.    Continue omeprazole         Hyperkalemia- (present on admission)   Assessment & Plan    kayexelate  Monitor on cardiac tele  ekg is noted    Repeat blood work pending          VTE prophylaxis: Heparin

## 2018-12-06 NOTE — PROCEDURES
Pt with ESRD, presented with uremia,resp failure.  Pt is doing better.  Seen and examined while getting HD.

## 2018-12-06 NOTE — PROGRESS NOTES
[facility] Medicine Daily Progress Note    Date of Service  12/6/2018    Chief Complaint  38 y.o. female history of end-stage renal disease on dialysis, C. difficile colitis, ESBL, fibromyalgia, hypertension, lupus, pyelonephritis admitted 12/4/2018 with complaints of abdominal pain, constipation, missed hemodialysis, uremia, hyperkalemia    Interval Problem Update  Patient states she feels better.  She is nauseous, has some upper abdominal discomfort.  No appetite.  Had bowel movements in the hospital.  Ordered sucralfate  Fluid overload and end-stage renal disease: Had hemodialysis session, -2000 cc  UTI: On Zosyn.  Urine culture pending  Hypertension: Not well controlled.  We will start on amlodipine    Consultants/Specialty  Nephrology    Code Status  Full code    Disposition  To be discussed    Review of Systems  Review of Systems   Unable to perform ROS: Mental status change   Constitutional: Negative for chills, fever and weight loss.   HENT: Negative for ear pain, hearing loss and tinnitus.    Eyes: Negative for blurred vision, double vision and photophobia.   Respiratory: Negative for cough, hemoptysis and sputum production.    Cardiovascular: Negative for chest pain, palpitations and orthopnea.   Gastrointestinal: Positive for abdominal pain, constipation and nausea. Negative for diarrhea, heartburn and vomiting.   Genitourinary: Negative for dysuria, frequency and urgency.   Musculoskeletal: Negative for back pain, myalgias and neck pain.   Skin: Negative for itching and rash.   Neurological: Negative for dizziness, tingling and headaches.   Endo/Heme/Allergies: Negative for environmental allergies and polydipsia. Does not bruise/bleed easily.   Psychiatric/Behavioral: Negative for depression, substance abuse and suicidal ideas.        Physical Exam  Temp:  [36.5 °C (97.7 °F)-37.9 °C (100.2 °F)] 37.4 °C (99.4 °F)  Pulse:  [] 100  Resp:  [15-18] 15  BP: (133-175)/() 174/93    Physical Exam    Constitutional: She appears well-developed. She has a sickly appearance.   HENT:   Head: Normocephalic and atraumatic.   Eyes: Pupils are equal, round, and reactive to light. EOM are normal.   Neck: Normal range of motion. Neck supple.   Cardiovascular: Normal rate.    Pulmonary/Chest: Effort normal and breath sounds normal. No respiratory distress.   Abdominal: Soft. Bowel sounds are normal. There is tenderness in the epigastric area. There is no rigidity and no guarding.   Musculoskeletal: Normal range of motion.   Skin: Skin is warm and dry.   Nursing note and vitals reviewed.       Fluids  No intake or output data in the 24 hours ending 12/06/18 1434    Laboratory  Recent Labs      12/04/18 1941 12/05/18   1814   WBC  10.0  9.5   RBC  3.12*  3.02*   HEMOGLOBIN  9.4*  9.2*   HEMATOCRIT  29.1*  27.0*   MCV  93.3  89.4   MCH  30.1  30.5   MCHC  32.3*  34.1   RDW  47.3  44.6   PLATELETCT  80*  101*   MPV  12.4  12.3     Recent Labs      12/04/18 1941 12/05/18 1814   SODIUM  133*  137   POTASSIUM  6.2*  3.5*   CHLORIDE  98  97   CO2  8*  19*   GLUCOSE  68  73   BUN  157*  80*   CREATININE  20.40*  13.09*   CALCIUM  8.1*  8.5         Recent Labs      12/04/18 1941   BNPBTYPENAT  >5000*           Imaging  CT-ABDOMEN-PELVIS W/O   Final Result         Interstitial prominence in lung bases with pleural effusions consistent with pulmonary edema.      Anasarca and trace ascites.      No evidence of acute abdominal or pelvic pathology.      DX-CHEST-PORTABLE (1 VIEW)   Final Result      Enlarged cardiac silhouette with interstitial prominence consistent with pulmonary edema with probable left pleural fluid.           Assessment/Plan  Encephalopathy   Assessment & Plan      Probably metabolic   Current level of uremia could cause encephalopathy  Infection could cause encephalopathy.  She was started on Zosyn IV for UTI      Clinically improving.  Back to baseline     ESRD (end stage renal disease) on dialysis  (HCC)- (present on admission)   Assessment & Plan    Nephrology is consulted by ed physician for dialysis  Has missed her routine dialysis    continue daily hemodialysis until improvement in uremia    Clinically improving.  Uremia going down     Gastritis   Assessment & Plan    Probably uremic  On omeprazole  We will add sucralfate  Zofran as     UTI (urinary tract infection)- (present on admission)   Assessment & Plan    History of ESBL  Positive UA  Started on Zosyn  Follow with urine culture  Endorses bladder symptoms     AP (abdominal pain)- (present on admission)   Assessment & Plan    Most likley 2nd to constipation, uremic gastritis  kayexelate is given to the pt  Bowel regiemen  Mag citrate if above treatment not successful  Ct of abd and pelvis showed:Interstitial prominence in lung bases with pleural effusions consistent with pulmonary edema.    Anasarca and trace ascites.    No evidence of acute abdominal or pelvic pathology.    Continue omeprazole, sucralfate added       Hyperkalemia- (present on admission)   Assessment & Plan    kayexelate  Monitor on cardiac tele  ekg is noted    Hyperkalemia resolved after hemodialysis          VTE prophylaxis: Heparin

## 2018-12-06 NOTE — PROGRESS NOTES
Hemodialysis ordered by Dr. Najjar. Treatment started at 0836 and ended at 1136. Pt stable, vss, no c/o post tx. See flow sheets for details. Net UF 2.0 L. Report to XIOMARA Jaime RN. Rt ua avg dressing clean, dry, intact.

## 2018-12-07 LAB
ALBUMIN SERPL BCP-MCNC: 3.6 G/DL (ref 3.2–4.9)
ALBUMIN/GLOB SERPL: 1 G/DL
ALP SERPL-CCNC: 58 U/L (ref 30–99)
ALT SERPL-CCNC: 7 U/L (ref 2–50)
AMMONIA PLAS-SCNC: 21 UMOL/L (ref 11–45)
ANION GAP SERPL CALC-SCNC: 14 MMOL/L (ref 0–11.9)
AST SERPL-CCNC: 11 U/L (ref 12–45)
BACTERIA UR CULT: NORMAL
BASOPHILS # BLD AUTO: 1.3 % (ref 0–1.8)
BASOPHILS # BLD: 0.09 K/UL (ref 0–0.12)
BILIRUB SERPL-MCNC: 0.7 MG/DL (ref 0.1–1.5)
BUN SERPL-MCNC: 44 MG/DL (ref 8–22)
CALCIUM SERPL-MCNC: 9 MG/DL (ref 8.5–10.5)
CHLORIDE SERPL-SCNC: 97 MMOL/L (ref 96–112)
CO2 SERPL-SCNC: 27 MMOL/L (ref 20–33)
CREAT SERPL-MCNC: 9.2 MG/DL (ref 0.5–1.4)
EKG IMPRESSION: NORMAL
EOSINOPHIL # BLD AUTO: 0.34 K/UL (ref 0–0.51)
EOSINOPHIL NFR BLD: 4.9 % (ref 0–6.9)
ERYTHROCYTE [DISTWIDTH] IN BLOOD BY AUTOMATED COUNT: 45.6 FL (ref 35.9–50)
GLOBULIN SER CALC-MCNC: 3.6 G/DL (ref 1.9–3.5)
GLUCOSE SERPL-MCNC: 99 MG/DL (ref 65–99)
HCT VFR BLD AUTO: 33.6 % (ref 37–47)
HGB BLD-MCNC: 11 G/DL (ref 12–16)
IMM GRANULOCYTES # BLD AUTO: 0.04 K/UL (ref 0–0.11)
IMM GRANULOCYTES NFR BLD AUTO: 0.6 % (ref 0–0.9)
LYMPHOCYTES # BLD AUTO: 1.67 K/UL (ref 1–4.8)
LYMPHOCYTES NFR BLD: 24.3 % (ref 22–41)
MCH RBC QN AUTO: 30.3 PG (ref 27–33)
MCHC RBC AUTO-ENTMCNC: 33.3 G/DL (ref 33.6–35)
MCV RBC AUTO: 91 FL (ref 81.4–97.8)
MONOCYTES # BLD AUTO: 0.83 K/UL (ref 0–0.85)
MONOCYTES NFR BLD AUTO: 12.1 % (ref 0–13.4)
NEUTROPHILS # BLD AUTO: 3.9 K/UL (ref 2–7.15)
NEUTROPHILS NFR BLD: 56.8 % (ref 44–72)
NRBC # BLD AUTO: 0 K/UL
NRBC BLD-RTO: 0 /100 WBC
PLATELET # BLD AUTO: 124 K/UL (ref 164–446)
PMV BLD AUTO: 12.4 FL (ref 9–12.9)
POTASSIUM SERPL-SCNC: 3.7 MMOL/L (ref 3.6–5.5)
PROT SERPL-MCNC: 7.2 G/DL (ref 6–8.2)
RBC # BLD AUTO: 3.66 M/UL (ref 4.2–5.4)
SIGNIFICANT IND 70042: NORMAL
SITE SITE: NORMAL
SODIUM SERPL-SCNC: 138 MMOL/L (ref 135–145)
SOURCE SOURCE: NORMAL
WBC # BLD AUTO: 6.9 K/UL (ref 4.8–10.8)

## 2018-12-07 PROCEDURE — 5A1D70Z PERFORMANCE OF URINARY FILTRATION, INTERMITTENT, LESS THAN 6 HOURS PER DAY: ICD-10-PCS | Performed by: INTERNAL MEDICINE

## 2018-12-07 PROCEDURE — 80053 COMPREHEN METABOLIC PANEL: CPT

## 2018-12-07 PROCEDURE — 700111 HCHG RX REV CODE 636 W/ 250 OVERRIDE (IP): Performed by: INTERNAL MEDICINE

## 2018-12-07 PROCEDURE — 770001 HCHG ROOM/CARE - MED/SURG/GYN PRIV*

## 2018-12-07 PROCEDURE — 700105 HCHG RX REV CODE 258: Performed by: INTERNAL MEDICINE

## 2018-12-07 PROCEDURE — 90935 HEMODIALYSIS ONE EVALUATION: CPT

## 2018-12-07 PROCEDURE — 93010 ELECTROCARDIOGRAM REPORT: CPT | Performed by: INTERNAL MEDICINE

## 2018-12-07 PROCEDURE — 90935 HEMODIALYSIS ONE EVALUATION: CPT | Performed by: INTERNAL MEDICINE

## 2018-12-07 PROCEDURE — A9270 NON-COVERED ITEM OR SERVICE: HCPCS | Performed by: INTERNAL MEDICINE

## 2018-12-07 PROCEDURE — 700102 HCHG RX REV CODE 250 W/ 637 OVERRIDE(OP): Performed by: INTERNAL MEDICINE

## 2018-12-07 PROCEDURE — A9270 NON-COVERED ITEM OR SERVICE: HCPCS | Performed by: FAMILY MEDICINE

## 2018-12-07 PROCEDURE — 700111 HCHG RX REV CODE 636 W/ 250 OVERRIDE (IP): Performed by: FAMILY MEDICINE

## 2018-12-07 PROCEDURE — 93005 ELECTROCARDIOGRAM TRACING: CPT | Performed by: INTERNAL MEDICINE

## 2018-12-07 PROCEDURE — 700101 HCHG RX REV CODE 250: Performed by: INTERNAL MEDICINE

## 2018-12-07 PROCEDURE — 99232 SBSQ HOSP IP/OBS MODERATE 35: CPT | Performed by: INTERNAL MEDICINE

## 2018-12-07 PROCEDURE — 82140 ASSAY OF AMMONIA: CPT

## 2018-12-07 PROCEDURE — 85025 COMPLETE CBC W/AUTO DIFF WBC: CPT

## 2018-12-07 PROCEDURE — 700102 HCHG RX REV CODE 250 W/ 637 OVERRIDE(OP): Performed by: FAMILY MEDICINE

## 2018-12-07 RX ORDER — LIDOCAINE 50 MG/G
1 PATCH TOPICAL EVERY 24 HOURS
Status: DISCONTINUED | OUTPATIENT
Start: 2018-12-07 | End: 2018-12-17 | Stop reason: HOSPADM

## 2018-12-07 RX ADMIN — PIPERACILLIN AND TAZOBACTAM 3.38 G: 3; .375 INJECTION, POWDER, LYOPHILIZED, FOR SOLUTION INTRAVENOUS; PARENTERAL at 13:52

## 2018-12-07 RX ADMIN — OMEPRAZOLE 20 MG: 20 CAPSULE, DELAYED RELEASE ORAL at 05:52

## 2018-12-07 RX ADMIN — BUPROPION HYDROCHLORIDE 150 MG: 150 TABLET, EXTENDED RELEASE ORAL at 05:52

## 2018-12-07 RX ADMIN — OXYCODONE HYDROCHLORIDE 10 MG: 5 TABLET ORAL at 21:00

## 2018-12-07 RX ADMIN — OXYCODONE HYDROCHLORIDE 10 MG: 5 TABLET ORAL at 12:26

## 2018-12-07 RX ADMIN — SEVELAMER CARBONATE 2400 MG: 800 TABLET, FILM COATED ORAL at 12:27

## 2018-12-07 RX ADMIN — PROMETHAZINE HYDROCHLORIDE 25 MG: 25 TABLET ORAL at 12:26

## 2018-12-07 RX ADMIN — LIDOCAINE 1 PATCH: 50 PATCH TOPICAL at 16:05

## 2018-12-07 RX ADMIN — SUCRALFATE 1 G: 1 TABLET ORAL at 17:00

## 2018-12-07 RX ADMIN — OXYCODONE HYDROCHLORIDE 10 MG: 5 TABLET ORAL at 16:55

## 2018-12-07 RX ADMIN — SUCRALFATE 1 G: 1 TABLET ORAL at 12:27

## 2018-12-07 RX ADMIN — SUCRALFATE 1 G: 1 TABLET ORAL at 23:10

## 2018-12-07 RX ADMIN — HYDROXYCHLOROQUINE SULFATE 200 MG: 200 TABLET, FILM COATED ORAL at 21:00

## 2018-12-07 RX ADMIN — FERROUS SULFATE TAB 325 MG (65 MG ELEMENTAL FE) 325 MG: 325 (65 FE) TAB at 05:52

## 2018-12-07 RX ADMIN — OXYCODONE HYDROCHLORIDE AND ACETAMINOPHEN 500 MG: 500 TABLET ORAL at 05:52

## 2018-12-07 RX ADMIN — SUCRALFATE 1 G: 1 TABLET ORAL at 01:30

## 2018-12-07 RX ADMIN — SUCRALFATE 1 G: 1 TABLET ORAL at 05:52

## 2018-12-07 RX ADMIN — PIPERACILLIN AND TAZOBACTAM 3.38 G: 3; .375 INJECTION, POWDER, LYOPHILIZED, FOR SOLUTION INTRAVENOUS; PARENTERAL at 01:30

## 2018-12-07 RX ADMIN — TRAZODONE HYDROCHLORIDE 50 MG: 50 TABLET ORAL at 21:03

## 2018-12-07 RX ADMIN — SEVELAMER CARBONATE 2400 MG: 800 TABLET, FILM COATED ORAL at 16:56

## 2018-12-07 RX ADMIN — ONDANSETRON HYDROCHLORIDE 4 MG: 2 INJECTION, SOLUTION INTRAMUSCULAR; INTRAVENOUS at 23:10

## 2018-12-07 RX ADMIN — PREGABALIN 100 MG: 100 CAPSULE ORAL at 05:52

## 2018-12-07 RX ADMIN — OXYCODONE HYDROCHLORIDE 10 MG: 5 TABLET ORAL at 02:24

## 2018-12-07 RX ADMIN — SEVELAMER CARBONATE 2400 MG: 800 TABLET, FILM COATED ORAL at 07:34

## 2018-12-07 RX ADMIN — VITAMIN D, TAB 1000IU (100/BT) 1000 UNITS: 25 TAB at 05:52

## 2018-12-07 RX ADMIN — ONDANSETRON HYDROCHLORIDE 4 MG: 2 INJECTION, SOLUTION INTRAMUSCULAR; INTRAVENOUS at 07:34

## 2018-12-07 ASSESSMENT — ENCOUNTER SYMPTOMS
BRUISES/BLEEDS EASILY: 0
COUGH: 0
TINGLING: 0
HEARTBURN: 0
HEMOPTYSIS: 0
DIZZINESS: 0
PHOTOPHOBIA: 0
HEADACHES: 0
NAUSEA: 1
MYALGIAS: 0
ABDOMINAL PAIN: 1
DOUBLE VISION: 0
BLURRED VISION: 0
DIARRHEA: 0
DEPRESSION: 0
SPUTUM PRODUCTION: 0
VOMITING: 0
FEVER: 0
NECK PAIN: 0
CHILLS: 0
BACK PAIN: 0
PALPITATIONS: 0
WEIGHT LOSS: 0
ORTHOPNEA: 0
POLYDIPSIA: 0
CONSTIPATION: 1

## 2018-12-07 ASSESSMENT — PAIN SCALES - GENERAL
PAINLEVEL_OUTOF10: 4
PAINLEVEL_OUTOF10: 5
PAINLEVEL_OUTOF10: 3
PAINLEVEL_OUTOF10: 5
PAINLEVEL_OUTOF10: 7
PAINLEVEL_OUTOF10: 4
PAINLEVEL_OUTOF10: 5
PAINLEVEL_OUTOF10: 7
PAINLEVEL_OUTOF10: 8
PAINLEVEL_OUTOF10: 7

## 2018-12-07 ASSESSMENT — PATIENT HEALTH QUESTIONNAIRE - PHQ9
2. FEELING DOWN, DEPRESSED, IRRITABLE, OR HOPELESS: NOT AT ALL
SUM OF ALL RESPONSES TO PHQ9 QUESTIONS 1 AND 2: 0
1. LITTLE INTEREST OR PLEASURE IN DOING THINGS: NOT AT ALL

## 2018-12-07 ASSESSMENT — LIFESTYLE VARIABLES: SUBSTANCE_ABUSE: 0

## 2018-12-07 NOTE — PROGRESS NOTES
Assumed care of patient bedside report received from JOSETTE Saavedra updated on POC, call light within reach and fall precautions in place. Bed locked and in lowest position. Patient instructed to call for assistance before getting out of bed. All questions answered, no other needs at this time.

## 2018-12-07 NOTE — PROGRESS NOTES
Pt. rosa maria @ 4023 from JOSETTE Prado. Denies pain/SOB. Bed in low/locked position, call bell within reach. No acute distress at this time, will continue to monitor.

## 2018-12-07 NOTE — CARE PLAN
Pt. Overall stable. Denies SOB. Plan to ambulate with o2 monitoring before discharge tomorrow to establish need for home o2. VSS. C/O abdominal/back pain controlled with oxycodone/lidocaine patch. Medical status, off telemetry. Dialysis today, see dialysis note.     Problem: Safety  Goal: Will remain free from injury  Outcome: PROGRESSING AS EXPECTED  Pt. Calls appropriately for assistance.    Problem: Pain Management  Goal: Pain level will decrease to patient's comfort goal  Outcome: PROGRESSING AS EXPECTED  Abd pain w/ interval improvement

## 2018-12-07 NOTE — PROGRESS NOTES
HEMODIALYSIS NOTES:     HD today x 3 hours per Dr. Najjar. Initiated at 0901 and ended at 1201. Patient tolerated treatment. Please see paper flowsheet for details.    UF Net: 3,000 mL    Blood returned. Applied gauze and held R AVG site for 10 minutes. Verified no bleeding. Bruit and thrill present post dialysis. Instructions given to Primary RN that if bleeding occurs on the AVF site, change dressing and held the site with pressure.     Report given to Primary RN.

## 2018-12-07 NOTE — PROCEDURES
Pt with ESRD, presented with uremia, volume overload.  Pt is doing better.  Seen and examined while getting HD.

## 2018-12-07 NOTE — WOUND TEAM
"[facility]  Inpatient Services  Initial Wound and Skin Care Evaluation    Admission Date: 12/4/2018     Consult Date: 12/07/2018 @ 1424   HPI, PMH, SH: Reviewed    Unit where seen by Wound Team: [id number]     WOUND CONSULT RELATED TO:  Possible pressure injury to left buttock     SUBJECTIVE:  \"I tend to have issues there ever since I was admitted in ICU several years ago,\"      Self Report / Pain Level:  Denies pain during assessment       OBJECTIVE: Pt sitting on bed. Pt able to stand up w/ stand by assist.     WOUND TYPE, LOCATION, CHARACTERISTICS (Pressure Injuries: location, stage, POA or date identified)        Wound 12/05/18 Partial Thickness Wound Buttocks (Active)   Wound Image      Site Assessment Yellow;Pink    Nathalie-wound Assessment Intact    Margins Defined edges    Wound Length (cm) 0.5 cm    Wound Width (cm) 0.5 cm    Wound Surface Area (cm^2) 0.25 cm^2    Closure None    Drainage Amount Scant    Drainage Description Serosanguineous    Non-staged Wound Description Partial thickness    Treatments Cleansed;Site care    Cleansing Normal Saline Irrigation    Periwound Protectant Not Applicable    Dressing Options Open to Air    Dressing Cleansing/Solutions Not Applicable    NEXT Weekly Photo (Inpatient Only) 12/14/18    WOUND NURSE ONLY - Odor None    WOUND NURSE ONLY - Exposed Structures None    WOUND NURSE ONLY - Tissue Type and Percentage 90% pink, 10% yellow             Lab Values:    WBC:       WBC   Date/Time Value Ref Range Status   12/07/2018 02:32 AM 6.9 4.8 - 10.8 K/uL Final     AIC:      Lab Results   Component Value Date/Time    HBA1C 4.9 06/07/2018 02:29 AM         Culture:   Deferred. No s/s of infection at this time    INTERVENTIONS BY WOUND TEAM:  Pt requested to stand for assessment. Pt able to stand up w/ stand by assist from this Wound Team RN. Pt reports that she was sick several weeks ago and was unable to provide proper self care and was experiencing some incontinence. Pt " was able to pull left buttock to expose wound on inner left buttock. Cleansed buttocks w/ no rinse foam soap and warm wash cloth. Measurements and picture done. Wound does not appear to be pressure related as it is not on a bony prominence. Patient's sacrococcygeal area is otherwise intact. Discussed possible dressing options with patient, but patient declined to have dressing put on. Pt states that she prefers to cleanse wound and keep it open to air. Pt is no longer incontinent at this time. Pt is now able to provide self care and is able to turn from side to side. Pt also reports that area has always been more prone to having an open wound when she gets sick because several years ago she developed a wound there when she was in ICU. Discussed w/ pt that nursing staff will continue to assess area to make sure it doesn't worsen and if it does, nursing staff will re-consult wound care team. Pt verbalized understanding. Discussed w/ Lane, bedside RN on POC.     Dressing selection:  None         Interdisciplinary consultation: Patient, Bedside RN (Lane),     EVALUATION: 37 y/o female w/ hx of ESRD and currently on dialysis. Pt admitted on 12/4 after c/o of ABD pain, constipation, hyperkalemia and uremia. Pt currently undergoing dialysis during this admit. Patient has a partial thickness wound that is not pressure related secondary to not being on a bony prominence. Pt declined to have dressing on. Wound team will not follow, but discussed w/ bedside RN Lane, that nursing staff will need to reconsult wound care if wound worsens.     Factors affecting wound healing: ESRD  Goals: Steady decrease in wound area and depth weekly.    NURSING PLAN OF CARE ORDERS (X):    Dressing changes: See Dressing Care orders:   Skin care: See Skin Care orders: X  Rectal tube care: See Rectal Tube Care orders:   Other orders:    RSKIN: CURRENT (X) ORDERED (O):   Q shift Taras:  X  Q shift pressure point assessments:  X  Pressure  redistribution mattress        X    SREEDHAR          Bariatric SREEDHAR         Bariatric foam           Heel float boots       Pt is ambulatory   Float Heels off Bed with Pillows   X while in bed            Barrier wipes         Barrier Cream          Barrier paste          Sacral silicone dressing         Silicone O2 tubing         Anchorfast         Cannula fixation Device (Tender )          Gray Foam Ear protectors           Trach with Optifoam split foam                 Waffle cushion      O  Waffle Overlay       O  Rectal tube or BMS         Antifungal tx      Interdry          Turn q 2 hours      Turns self   Up to chair      X  Ambulate    X  PT/OT        Dietician        Diabetes Education      PO   X  TF     TPN     NPO   # days   Other        WOUND TEAM PLAN OF CARE (X):   NPWT change 3 x week:        Dressing changes by wound team:       Follow up as needed:     X  Other (explain):     Anticipated discharge plans (X):   SNF:           Home Care:           Outpatient Wound Center:            Self Care:            Other:         TBD

## 2018-12-08 ENCOUNTER — PATIENT OUTREACH (OUTPATIENT)
Dept: HEALTH INFORMATION MANAGEMENT | Facility: OTHER | Age: 38
End: 2018-12-08

## 2018-12-08 PROCEDURE — 700101 HCHG RX REV CODE 250: Performed by: INTERNAL MEDICINE

## 2018-12-08 PROCEDURE — 770001 HCHG ROOM/CARE - MED/SURG/GYN PRIV*

## 2018-12-08 PROCEDURE — 700111 HCHG RX REV CODE 636 W/ 250 OVERRIDE (IP): Performed by: FAMILY MEDICINE

## 2018-12-08 PROCEDURE — 700105 HCHG RX REV CODE 258: Performed by: INTERNAL MEDICINE

## 2018-12-08 PROCEDURE — 700111 HCHG RX REV CODE 636 W/ 250 OVERRIDE (IP): Performed by: INTERNAL MEDICINE

## 2018-12-08 PROCEDURE — 700102 HCHG RX REV CODE 250 W/ 637 OVERRIDE(OP): Performed by: INTERNAL MEDICINE

## 2018-12-08 PROCEDURE — A9270 NON-COVERED ITEM OR SERVICE: HCPCS | Performed by: INTERNAL MEDICINE

## 2018-12-08 PROCEDURE — A9270 NON-COVERED ITEM OR SERVICE: HCPCS | Performed by: FAMILY MEDICINE

## 2018-12-08 PROCEDURE — 99232 SBSQ HOSP IP/OBS MODERATE 35: CPT | Performed by: INTERNAL MEDICINE

## 2018-12-08 PROCEDURE — 700102 HCHG RX REV CODE 250 W/ 637 OVERRIDE(OP): Performed by: FAMILY MEDICINE

## 2018-12-08 RX ORDER — LIDOCAINE 50 MG/G
1 PATCH TOPICAL EVERY 24 HOURS
Qty: 10 PATCH | Refills: 1 | Status: SHIPPED | OUTPATIENT
Start: 2018-12-08 | End: 2019-06-21

## 2018-12-08 RX ORDER — PREGABALIN 100 MG/1
100 CAPSULE ORAL DAILY
Qty: 3 CAP | Refills: 0 | Status: SHIPPED | OUTPATIENT
Start: 2018-12-08 | End: 2018-12-17

## 2018-12-08 RX ORDER — POLYETHYLENE GLYCOL 3350 17 G/17G
17 POWDER, FOR SOLUTION ORAL DAILY
Qty: 100 EACH | Refills: 0 | Status: SHIPPED | OUTPATIENT
Start: 2018-12-08 | End: 2019-01-01 | Stop reason: CLARIF

## 2018-12-08 RX ORDER — SUCRALFATE 1 G/1
1 TABLET ORAL EVERY 6 HOURS
Qty: 120 TAB | Refills: 3 | Status: SHIPPED | OUTPATIENT
Start: 2018-12-08 | End: 2019-01-01 | Stop reason: CLARIF

## 2018-12-08 RX ORDER — OXYCODONE HYDROCHLORIDE 5 MG/1
5-10 TABLET ORAL EVERY 4 HOURS PRN
Qty: 12 TAB | Refills: 0 | Status: SHIPPED | OUTPATIENT
Start: 2018-12-08 | End: 2018-12-11

## 2018-12-08 RX ORDER — AMOXICILLIN 250 MG
2 CAPSULE ORAL 2 TIMES DAILY
Qty: 30 TAB | Refills: 0 | Status: SHIPPED | OUTPATIENT
Start: 2018-12-08 | End: 2019-01-01 | Stop reason: CLARIF

## 2018-12-08 RX ADMIN — ONDANSETRON HYDROCHLORIDE 4 MG: 2 INJECTION, SOLUTION INTRAMUSCULAR; INTRAVENOUS at 08:14

## 2018-12-08 RX ADMIN — OXYCODONE HYDROCHLORIDE 10 MG: 5 TABLET ORAL at 23:56

## 2018-12-08 RX ADMIN — SUCRALFATE 1 G: 1 TABLET ORAL at 11:04

## 2018-12-08 RX ADMIN — OXYCODONE HYDROCHLORIDE 10 MG: 5 TABLET ORAL at 13:57

## 2018-12-08 RX ADMIN — OXYCODONE HYDROCHLORIDE 10 MG: 5 TABLET ORAL at 19:32

## 2018-12-08 RX ADMIN — PREGABALIN 100 MG: 100 CAPSULE ORAL at 06:24

## 2018-12-08 RX ADMIN — OXYCODONE HYDROCHLORIDE AND ACETAMINOPHEN 500 MG: 500 TABLET ORAL at 06:24

## 2018-12-08 RX ADMIN — VITAMIN D, TAB 1000IU (100/BT) 1000 UNITS: 25 TAB at 06:24

## 2018-12-08 RX ADMIN — PROMETHAZINE HYDROCHLORIDE 25 MG: 25 TABLET ORAL at 14:01

## 2018-12-08 RX ADMIN — TRAZODONE HYDROCHLORIDE 50 MG: 50 TABLET ORAL at 21:57

## 2018-12-08 RX ADMIN — SUCRALFATE 1 G: 1 TABLET ORAL at 23:56

## 2018-12-08 RX ADMIN — STANDARDIZED SENNA CONCENTRATE AND DOCUSATE SODIUM 2 TABLET: 8.6; 5 TABLET, FILM COATED ORAL at 16:56

## 2018-12-08 RX ADMIN — LIDOCAINE 1 PATCH: 50 PATCH TOPICAL at 16:58

## 2018-12-08 RX ADMIN — OMEPRAZOLE 20 MG: 20 CAPSULE, DELAYED RELEASE ORAL at 06:24

## 2018-12-08 RX ADMIN — PIPERACILLIN AND TAZOBACTAM 3.38 G: 3; .375 INJECTION, POWDER, LYOPHILIZED, FOR SOLUTION INTRAVENOUS; PARENTERAL at 02:11

## 2018-12-08 RX ADMIN — SUCRALFATE 1 G: 1 TABLET ORAL at 06:24

## 2018-12-08 RX ADMIN — OXYCODONE HYDROCHLORIDE 10 MG: 5 TABLET ORAL at 02:11

## 2018-12-08 RX ADMIN — SUCRALFATE 1 G: 1 TABLET ORAL at 16:56

## 2018-12-08 RX ADMIN — PROMETHAZINE HYDROCHLORIDE 25 MG: 25 TABLET ORAL at 19:34

## 2018-12-08 RX ADMIN — HYDROXYCHLOROQUINE SULFATE 200 MG: 200 TABLET, FILM COATED ORAL at 21:58

## 2018-12-08 RX ADMIN — SEVELAMER CARBONATE 2400 MG: 800 TABLET, FILM COATED ORAL at 16:56

## 2018-12-08 RX ADMIN — PIPERACILLIN AND TAZOBACTAM 3.38 G: 3; .375 INJECTION, POWDER, LYOPHILIZED, FOR SOLUTION INTRAVENOUS; PARENTERAL at 13:11

## 2018-12-08 RX ADMIN — FERROUS SULFATE TAB 325 MG (65 MG ELEMENTAL FE) 325 MG: 325 (65 FE) TAB at 06:24

## 2018-12-08 RX ADMIN — SEVELAMER CARBONATE 2400 MG: 800 TABLET, FILM COATED ORAL at 08:13

## 2018-12-08 RX ADMIN — OXYCODONE HYDROCHLORIDE 10 MG: 5 TABLET ORAL at 09:55

## 2018-12-08 RX ADMIN — BUPROPION HYDROCHLORIDE 150 MG: 150 TABLET, EXTENDED RELEASE ORAL at 06:24

## 2018-12-08 ASSESSMENT — ENCOUNTER SYMPTOMS
FEVER: 0
VOMITING: 0
NAUSEA: 0
WEAKNESS: 1
COUGH: 0
CHILLS: 0
SHORTNESS OF BREATH: 0

## 2018-12-08 ASSESSMENT — PAIN SCALES - GENERAL
PAINLEVEL_OUTOF10: 3
PAINLEVEL_OUTOF10: 5
PAINLEVEL_OUTOF10: 7
PAINLEVEL_OUTOF10: 5
PAINLEVEL_OUTOF10: 8

## 2018-12-08 NOTE — CARE PLAN
Problem: Safety  Goal: Will remain free from falls  Outcome: PROGRESSING AS EXPECTED  Bedside table and call light are within reach. Bed is locked and in the lowest position. Treaded socks are on. Patient educated to call for assistance.     Problem: Knowledge Deficit  Goal: Knowledge of disease process/condition, treatment plan, diagnostic tests, and medications will improve  Outcome: PROGRESSING AS EXPECTED  Verbal education provided to pt. about disease process/condition and corresponding treatment. All questions and concerns have been addressed at this time

## 2018-12-08 NOTE — PROGRESS NOTES
[facility] Medicine Daily Progress Note    Date of Service  12/7/2018    Chief Complaint  38 y.o. female history of end-stage renal disease on dialysis, C. difficile colitis, ESBL, fibromyalgia, hypertension, lupus, pyelonephritis admitted 12/4/2018 with complaints of abdominal pain, constipation, missed hemodialysis, uremia, hyperkalemia    Interval Problem Update  Patient states she feels better today  Abdominal pain and nausea improved  Fluid overload and end-stage renal disease: Had hemodialysis session.  Uremia is improved  UTI: On Zosyn.  Urine culture pending  Hypertension: Not well controlled.  We will start on amlodipine    Consultants/Specialty  Nephrology    Code Status  Full code    Disposition  To be discussed    Review of Systems  Review of Systems   Constitutional: Negative for chills, fever and weight loss.   HENT: Negative for ear pain, hearing loss and tinnitus.    Eyes: Negative for blurred vision, double vision and photophobia.   Respiratory: Negative for cough, hemoptysis and sputum production.    Cardiovascular: Negative for chest pain, palpitations and orthopnea.   Gastrointestinal: Positive for abdominal pain, constipation and nausea. Negative for diarrhea, heartburn and vomiting.   Genitourinary: Negative for dysuria, frequency and urgency.   Musculoskeletal: Negative for back pain, myalgias and neck pain.   Skin: Negative for itching and rash.   Neurological: Negative for dizziness, tingling and headaches.   Endo/Heme/Allergies: Negative for environmental allergies and polydipsia. Does not bruise/bleed easily.   Psychiatric/Behavioral: Negative for depression, substance abuse and suicidal ideas.        Physical Exam  Temp:  [36.6 °C (97.8 °F)-38.1 °C (100.6 °F)] 36.6 °C (97.8 °F)  Pulse:  [] 94  Resp:  [15-20] 17  BP: (108-125)/(64-88) 115/69    Physical Exam   Constitutional: She appears well-developed. She has a sickly appearance.   HENT:   Head: Normocephalic and atraumatic.   Eyes:  Pupils are equal, round, and reactive to light. EOM are normal.   Neck: Normal range of motion. Neck supple.   Cardiovascular: Normal rate.    Pulmonary/Chest: Effort normal and breath sounds normal. No respiratory distress.   Abdominal: Soft. Bowel sounds are normal. There is tenderness in the epigastric area. There is no rigidity and no guarding.   Musculoskeletal: Normal range of motion.   Skin: Skin is warm and dry.   Nursing note and vitals reviewed.       Fluids    Intake/Output Summary (Last 24 hours) at 12/07/18 1748  Last data filed at 12/07/18 1400   Gross per 24 hour   Intake             1388 ml   Output             3500 ml   Net            -2112 ml       Laboratory  Recent Labs      12/04/18 1941 12/05/18 1814 12/07/18   0232   WBC  10.0  9.5  6.9   RBC  3.12*  3.02*  3.66*   HEMOGLOBIN  9.4*  9.2*  11.0*   HEMATOCRIT  29.1*  27.0*  33.6*   MCV  93.3  89.4  91.0   MCH  30.1  30.5  30.3   MCHC  32.3*  34.1  33.3*   RDW  47.3  44.6  45.6   PLATELETCT  80*  101*  124*   MPV  12.4  12.3  12.4     Recent Labs      12/04/18 1941 12/05/18 1814 12/07/18   0232   SODIUM  133*  137  138   POTASSIUM  6.2*  3.5*  3.7   CHLORIDE  98  97  97   CO2  8*  19*  27   GLUCOSE  68  73  99   BUN  157*  80*  44*   CREATININE  20.40*  13.09*  9.20*   CALCIUM  8.1*  8.5  9.0         Recent Labs      12/04/18 1941   BNPBTYPENAT  >5000*           Imaging  CT-ABDOMEN-PELVIS W/O   Final Result         Interstitial prominence in lung bases with pleural effusions consistent with pulmonary edema.      Anasarca and trace ascites.      No evidence of acute abdominal or pelvic pathology.      DX-CHEST-PORTABLE (1 VIEW)   Final Result      Enlarged cardiac silhouette with interstitial prominence consistent with pulmonary edema with probable left pleural fluid.           Assessment/Plan  Encephalopathy   Assessment & Plan      Probably metabolic   Current level of uremia could cause encephalopathy  Infection could cause  encephalopathy.  She was started on Zosyn IV for UTI      Clinically improving.  Back to baseline     ESRD (end stage renal disease) on dialysis (HCC)- (present on admission)   Assessment & Plan    Nephrology is consulted by ed physician for dialysis  Has missed her routine dialysis    continue daily hemodialysis until improvement in uremia    Clinically improving.  Uremia going down     Gastritis   Assessment & Plan    Probably uremic  On omeprazole  We will add sucralfate  Zofran as    Improved     UTI (urinary tract infection)- (present on admission)   Assessment & Plan    History of ESBL  Positive UA  Started on Zosyn  Follow with urine culture  Endorses bladder symptoms  Dysuria improved     AP (abdominal pain)- (present on admission)   Assessment & Plan    Most likley 2nd to constipation, uremic gastritis  kayexelate is given to the pt  Bowel regiemen  Mag citrate if above treatment not successful  Ct of abd and pelvis showed:Interstitial prominence in lung bases with pleural effusions consistent with pulmonary edema.    Anasarca and trace ascites.    No evidence of acute abdominal or pelvic pathology.        Continue omeprazole, sucralfate added    Improved     Hyperkalemia- (present on admission)   Assessment & Plan    kayexelate  Monitor on cardiac tele  ekg is noted    Hyperkalemia resolved after hemodialysis          VTE prophylaxis: Heparin

## 2018-12-08 NOTE — PROGRESS NOTES
Assumed care of patient bedside report received from JOSETTE Sherman updated on POC, call light within reach and fall precautions in place. Bed locked and in lowest position. Patient instructed to call for assistance before getting out of bed. All questions answered, no other needs at this time.

## 2018-12-08 NOTE — DISCHARGE SUMMARY
Discharge Summary    CHIEF COMPLAINT ON ADMISSION  Chief Complaint   Patient presents with   • Abdominal Pain       Reason for Admission  EMS      Admission Date  12/4/2018    CODE STATUS  Full Code    HPI & HOSPITAL COURSE  38 y.o. female history of end-stage renal disease on dialysis, C. difficile colitis, ESBL, fibromyalgia, hypertension, lupus, pyelonephritis admitted 12/4/2018 with complaints of abdominal pain, constipation, missed hemodialysis, uremia, hyperkalemia  Patient received daily hemodialysis with improvement of uremia.  She had transient episode of lethargy ,  likely secondary to uremia and side effects of pain medications.  Dose of  pregabalin reduced  Abdominal CAT scan was done on admission, did not show acute abnormalities in the abdomen or pelvis.  Abdominal pain improved with treatment of constipation.  She has been having nausea intermittently  PT evaluated patient on the day of discharge, recommended home health PT.  That was ordered for the patient       Therefore, she is discharged in fair and stable condition to home with close outpatient follow-up.    The patient met 2-midnight criteria for an inpatient stay at the time of discharge.    Discharge Date  12/10/2018    FOLLOW UP ITEMS POST DISCHARGE  PCP nephrology    DISCHARGE DIAGNOSES  Active Problems:    Encephalopathy POA: Unknown    ESRD (end stage renal disease) on dialysis (HCC) POA: Yes    Hyperkalemia POA: Yes    AP (abdominal pain) POA: Yes    UTI (urinary tract infection) POA: Yes    Gastritis POA: Unknown  Resolved Problems:    * No resolved hospital problems. *      FOLLOW UP  Future Appointments  Date Time Provider Department Center   12/14/2018 9:00 AM Neftaly Diaz M.D. [id number]   12/18/2018 8:15 AM Quinn Chandler M.D. [facility] None     No follow-up provider specified.    MEDICATIONS ON DISCHARGE     Medication List      START taking these medications      Instructions   Baclofen 5 MG Tabs   Take 5 mg by mouth 2  Times a Day.  Dose:  5 mg     lidocaine 5 % Ptch  Commonly known as:  LIDODERM   Apply 1 Patch to skin as directed every 24 hours.  Dose:  1 Patch     oxyCODONE immediate-release 5 MG Tabs  Commonly known as:  ROXICODONE   Take 1-2 Tabs by mouth every four hours as needed for Severe Pain for up to 3 days.  Dose:  5-10 mg     polyethylene glycol/lytes Pack  Commonly known as:  MIRALAX   Take 1 Packet by mouth every day.  Dose:  17 g     senna-docusate 8.6-50 MG Tabs  Commonly known as:  PERICOLACE or SENOKOT S   Take 2 Tabs by mouth 2 Times a Day.  Dose:  2 Tab     sucralfate 1 GM Tabs  Commonly known as:  CARAFATE   Take 1 Tab by mouth every 6 hours.  Dose:  1 g        CHANGE how you take these medications      Instructions   pregabalin 100 MG Caps  What changed:  when to take this  Commonly known as:  LYRICA   Take 1 Cap by mouth every day for 3 days.  Dose:  100 mg        CONTINUE taking these medications      Instructions   ascorbic acid 500 MG Tabs  Commonly known as:  ascorbic acid   Take 500 mg by mouth every day.  Dose:  500 mg     cholecalciferol 5000 UNIT Caps  Commonly known as:  VITAMIN D3   Take 10,000 Units by mouth every day.  Dose:  30330 Units     ferrous sulfate 325 (65 Fe) MG tablet   Take 325 mg by mouth every day.  Dose:  325 mg     hydroxychloroquine 200 MG Tabs  Commonly known as:  PLAQUENIL   Take 200 mg by mouth every bedtime.  Dose:  200 mg     omeprazole 20 MG delayed-release capsule  Commonly known as:  PRILOSEC   Take 1 Cap by mouth every day.  Dose:  20 mg     RENVELA 800 MG Tabs tablet  Generic drug:  sevelamer carbonate   Take 2,400 mg by mouth 3 times a day, with meals.  Dose:  2400 mg     tizanidine 4 MG Tabs  Commonly known as:  ZANAFLEX   Take 2 Tabs by mouth every bedtime.  Dose:  8 mg     traZODone 50 MG Tabs  Commonly known as:  DESYREL   Take 1 Tab by mouth every bedtime.  Dose:  50 mg     WELLBUTRIN  MG XL tablet  Generic drug:  buPROPion   Take 150 mg by mouth every  morning.  Dose:  150 mg            Allergies  Allergies   Allergen Reactions   • Lorazepam [Ativan] Anxiety     Patient becomes severely paranoid and agitated   • Morphine Itching     Tolerates Dilaudid   • Seasonal Runny Nose and Itching     Hay fever, sabiha brush       DIET  Orders Placed This Encounter   Procedures   • Diet Order Regular, Renal     Standing Status:   Standing     Number of Occurrences:   1     Order Specific Question:   Diet:     Answer:   Regular [1]     Order Specific Question:   Diet:     Answer:   Renal [8]       ACTIVITY  As tolerated.  Weight bearing as tolerated    CONSULTATIONS  Nephrology    PROCEDURES  None    LABORATORY  Lab Results   Component Value Date    SODIUM 138 12/07/2018    POTASSIUM 3.7 12/07/2018    CHLORIDE 97 12/07/2018    CO2 27 12/07/2018    GLUCOSE 99 12/07/2018    BUN 44 (H) 12/07/2018    CREATININE 9.20 (HH) 12/07/2018    CREATININE 0.9 12/13/2008        Lab Results   Component Value Date    WBC 6.9 12/07/2018    HEMOGLOBIN 11.0 (L) 12/07/2018    HEMATOCRIT 33.6 (L) 12/07/2018    PLATELETCT 124 (L) 12/07/2018      CT-ABDOMEN-PELVIS W/O   Final Result         Interstitial prominence in lung bases with pleural effusions consistent with pulmonary edema.      Anasarca and trace ascites.      No evidence of acute abdominal or pelvic pathology.      DX-CHEST-PORTABLE (1 VIEW)   Final Result      Enlarged cardiac silhouette with interstitial prominence consistent with pulmonary edema with probable left pleural fluid.            Total time of the discharge process exceeds 47 minutes.

## 2018-12-08 NOTE — DISCHARGE PLANNING
"Pt reports she has everything she needs to be successful, however needs to apply for medicaid as it has lapsed.  Pt voiced concerns about her grandmother (her ride to dialysis and f/u appt).  She wants to see if [facility] can financially reimburse her grandmother for gas money.  RN reported she was not sure about [facility]'s policies and offered [facility]TM info, pt reports she already has it.  Pt provided with printed [place] medicaid application, pt reports she wants to fill this out then take in to be approved herself.  Pt reports she misses dialysis sometimes because she is unable to walk at times.  She reports her legs \"buckle\", per bedside RN PT eval ordered.      Discharge Planning Checklist     Discharge Navigator Complete: no no orders placed  Is all DME in place: yes  Has patient bed educated on DME: N\A  Is consent for controlled substance signed: N\A  Are all specialties signed off at discharge: No    Referral  Has home health referral been accepted: N\A  Were PT/OT/SLP notes reviewed and verified with d/c plan: no awaiting eval   Does the patient need follow up wound care: No  - if Yes, are post acute arrangements made: N/A  Hand off report called to receiving care givers: No    Prescriptions  Did patient receive all prescriptions: N\A  - if electronically prescribed, was pharmacy called to verify availability: no  - is patient able to  prescriptions: yes    Follow Up  Have all necessary follow up appointments been scheduled:  - PCP no order placed  - Specialty Follow Up no f/u with dialysis as schduled  Patient on Anticoagulation: No  - Coumadin Clinic no N/A  Out Patient Dialysis arranged: Yes, Details: already in place.     Education   Did patient receive home care instructions: Yes, Details: existing dialysis pt  - DM Education: N\A  - COPD Education: N\A  - Coumadin Education by Pharmacy (called and verified): N\A  Core Measures Education Included in Discharge Instructions: N/A  Diagnosis Specific instructions in " AVS: N/A  Educated on symptome management with teach back: yes  Educated on Pain Management: no  - Pt has PCP for long term pain management: Yes

## 2018-12-09 LAB
ANION GAP SERPL CALC-SCNC: 14 MMOL/L (ref 0–11.9)
BACTERIA BLD CULT: NORMAL
BASOPHILS # BLD AUTO: 1.4 % (ref 0–1.8)
BASOPHILS # BLD: 0.07 K/UL (ref 0–0.12)
BUN SERPL-MCNC: 49 MG/DL (ref 8–22)
CALCIUM SERPL-MCNC: 8.6 MG/DL (ref 8.5–10.5)
CHLORIDE SERPL-SCNC: 95 MMOL/L (ref 96–112)
CO2 SERPL-SCNC: 24 MMOL/L (ref 20–33)
CREAT SERPL-MCNC: 10.05 MG/DL (ref 0.5–1.4)
EOSINOPHIL # BLD AUTO: 0.31 K/UL (ref 0–0.51)
EOSINOPHIL NFR BLD: 6.1 % (ref 0–6.9)
ERYTHROCYTE [DISTWIDTH] IN BLOOD BY AUTOMATED COUNT: 45.1 FL (ref 35.9–50)
ERYTHROCYTE [SEDIMENTATION RATE] IN BLOOD BY WESTERGREN METHOD: 73 MM/HOUR (ref 0–20)
GLUCOSE SERPL-MCNC: 100 MG/DL (ref 65–99)
HCT VFR BLD AUTO: 31.2 % (ref 37–47)
HGB BLD-MCNC: 10 G/DL (ref 12–16)
IMM GRANULOCYTES # BLD AUTO: 0.03 K/UL (ref 0–0.11)
IMM GRANULOCYTES NFR BLD AUTO: 0.6 % (ref 0–0.9)
LYMPHOCYTES # BLD AUTO: 1.47 K/UL (ref 1–4.8)
LYMPHOCYTES NFR BLD: 28.9 % (ref 22–41)
MCH RBC QN AUTO: 30 PG (ref 27–33)
MCHC RBC AUTO-ENTMCNC: 32.1 G/DL (ref 33.6–35)
MCV RBC AUTO: 93.7 FL (ref 81.4–97.8)
MONOCYTES # BLD AUTO: 0.78 K/UL (ref 0–0.85)
MONOCYTES NFR BLD AUTO: 15.3 % (ref 0–13.4)
NEUTROPHILS # BLD AUTO: 2.43 K/UL (ref 2–7.15)
NEUTROPHILS NFR BLD: 47.7 % (ref 44–72)
NRBC # BLD AUTO: 0 K/UL
NRBC BLD-RTO: 0 /100 WBC
PLATELET # BLD AUTO: 144 K/UL (ref 164–446)
PMV BLD AUTO: 12.4 FL (ref 9–12.9)
POTASSIUM SERPL-SCNC: 4.4 MMOL/L (ref 3.6–5.5)
RBC # BLD AUTO: 3.33 M/UL (ref 4.2–5.4)
SIGNIFICANT IND 70042: NORMAL
SITE SITE: NORMAL
SODIUM SERPL-SCNC: 133 MMOL/L (ref 135–145)
SOURCE SOURCE: NORMAL
WBC # BLD AUTO: 5.1 K/UL (ref 4.8–10.8)

## 2018-12-09 PROCEDURE — 770001 HCHG ROOM/CARE - MED/SURG/GYN PRIV*

## 2018-12-09 PROCEDURE — A9270 NON-COVERED ITEM OR SERVICE: HCPCS | Performed by: INTERNAL MEDICINE

## 2018-12-09 PROCEDURE — 700111 HCHG RX REV CODE 636 W/ 250 OVERRIDE (IP): Performed by: INTERNAL MEDICINE

## 2018-12-09 PROCEDURE — 99232 SBSQ HOSP IP/OBS MODERATE 35: CPT | Performed by: INTERNAL MEDICINE

## 2018-12-09 PROCEDURE — A9270 NON-COVERED ITEM OR SERVICE: HCPCS | Performed by: FAMILY MEDICINE

## 2018-12-09 PROCEDURE — 700101 HCHG RX REV CODE 250: Performed by: INTERNAL MEDICINE

## 2018-12-09 PROCEDURE — 85652 RBC SED RATE AUTOMATED: CPT

## 2018-12-09 PROCEDURE — 700105 HCHG RX REV CODE 258: Performed by: INTERNAL MEDICINE

## 2018-12-09 PROCEDURE — G8979 MOBILITY GOAL STATUS: HCPCS | Mod: CI

## 2018-12-09 PROCEDURE — G8978 MOBILITY CURRENT STATUS: HCPCS | Mod: CJ

## 2018-12-09 PROCEDURE — 700102 HCHG RX REV CODE 250 W/ 637 OVERRIDE(OP): Performed by: FAMILY MEDICINE

## 2018-12-09 PROCEDURE — 700102 HCHG RX REV CODE 250 W/ 637 OVERRIDE(OP): Performed by: INTERNAL MEDICINE

## 2018-12-09 PROCEDURE — 97161 PT EVAL LOW COMPLEX 20 MIN: CPT

## 2018-12-09 PROCEDURE — 700111 HCHG RX REV CODE 636 W/ 250 OVERRIDE (IP): Performed by: FAMILY MEDICINE

## 2018-12-09 PROCEDURE — 80048 BASIC METABOLIC PNL TOTAL CA: CPT

## 2018-12-09 PROCEDURE — 85025 COMPLETE CBC W/AUTO DIFF WBC: CPT

## 2018-12-09 RX ORDER — OXYCODONE HYDROCHLORIDE 10 MG/1
10 TABLET ORAL ONCE
Status: COMPLETED | OUTPATIENT
Start: 2018-12-09 | End: 2018-12-09

## 2018-12-09 RX ADMIN — OXYCODONE HYDROCHLORIDE 10 MG: 5 TABLET ORAL at 08:51

## 2018-12-09 RX ADMIN — PIPERACILLIN AND TAZOBACTAM 3.38 G: 3; .375 INJECTION, POWDER, LYOPHILIZED, FOR SOLUTION INTRAVENOUS; PARENTERAL at 13:03

## 2018-12-09 RX ADMIN — OXYCODONE HYDROCHLORIDE AND ACETAMINOPHEN 500 MG: 500 TABLET ORAL at 06:07

## 2018-12-09 RX ADMIN — PIPERACILLIN AND TAZOBACTAM 3.38 G: 3; .375 INJECTION, POWDER, LYOPHILIZED, FOR SOLUTION INTRAVENOUS; PARENTERAL at 03:01

## 2018-12-09 RX ADMIN — SUCRALFATE 1 G: 1 TABLET ORAL at 18:04

## 2018-12-09 RX ADMIN — VITAMIN D, TAB 1000IU (100/BT) 1000 UNITS: 25 TAB at 06:07

## 2018-12-09 RX ADMIN — OMEPRAZOLE 20 MG: 20 CAPSULE, DELAYED RELEASE ORAL at 06:07

## 2018-12-09 RX ADMIN — PROMETHAZINE HYDROCHLORIDE 25 MG: 25 TABLET ORAL at 23:06

## 2018-12-09 RX ADMIN — FERROUS SULFATE TAB 325 MG (65 MG ELEMENTAL FE) 325 MG: 325 (65 FE) TAB at 06:07

## 2018-12-09 RX ADMIN — TRAZODONE HYDROCHLORIDE 50 MG: 50 TABLET ORAL at 21:40

## 2018-12-09 RX ADMIN — OXYCODONE HYDROCHLORIDE 10 MG: 5 TABLET ORAL at 18:03

## 2018-12-09 RX ADMIN — STANDARDIZED SENNA CONCENTRATE AND DOCUSATE SODIUM 2 TABLET: 8.6; 5 TABLET, FILM COATED ORAL at 06:07

## 2018-12-09 RX ADMIN — OXYCODONE HYDROCHLORIDE 10 MG: 5 TABLET ORAL at 04:23

## 2018-12-09 RX ADMIN — STANDARDIZED SENNA CONCENTRATE AND DOCUSATE SODIUM 2 TABLET: 8.6; 5 TABLET, FILM COATED ORAL at 18:07

## 2018-12-09 RX ADMIN — PROMETHAZINE HYDROCHLORIDE 25 MG: 25 TABLET ORAL at 12:33

## 2018-12-09 RX ADMIN — OXYCODONE HYDROCHLORIDE 10 MG: 5 TABLET ORAL at 13:03

## 2018-12-09 RX ADMIN — SUCRALFATE 1 G: 1 TABLET ORAL at 12:34

## 2018-12-09 RX ADMIN — HYDROXYCHLOROQUINE SULFATE 200 MG: 200 TABLET, FILM COATED ORAL at 21:40

## 2018-12-09 RX ADMIN — SEVELAMER CARBONATE 2400 MG: 800 TABLET, FILM COATED ORAL at 12:34

## 2018-12-09 RX ADMIN — OXYCODONE HYDROCHLORIDE 10 MG: 5 TABLET ORAL at 23:03

## 2018-12-09 RX ADMIN — SEVELAMER CARBONATE 2400 MG: 800 TABLET, FILM COATED ORAL at 06:07

## 2018-12-09 RX ADMIN — SUCRALFATE 1 G: 1 TABLET ORAL at 06:06

## 2018-12-09 RX ADMIN — ONDANSETRON HYDROCHLORIDE 4 MG: 2 INJECTION, SOLUTION INTRAMUSCULAR; INTRAVENOUS at 08:10

## 2018-12-09 RX ADMIN — BUPROPION HYDROCHLORIDE 150 MG: 150 TABLET, EXTENDED RELEASE ORAL at 06:06

## 2018-12-09 RX ADMIN — PREGABALIN 100 MG: 100 CAPSULE ORAL at 06:07

## 2018-12-09 RX ADMIN — SEVELAMER CARBONATE 2400 MG: 800 TABLET, FILM COATED ORAL at 18:04

## 2018-12-09 RX ADMIN — LIDOCAINE 1 PATCH: 50 PATCH TOPICAL at 16:02

## 2018-12-09 RX ADMIN — SUCRALFATE 1 G: 1 TABLET ORAL at 23:03

## 2018-12-09 RX ADMIN — ONDANSETRON HYDROCHLORIDE 4 MG: 2 INJECTION, SOLUTION INTRAMUSCULAR; INTRAVENOUS at 18:07

## 2018-12-09 RX ADMIN — OXYCODONE HYDROCHLORIDE 10 MG: 10 TABLET ORAL at 21:40

## 2018-12-09 ASSESSMENT — ENCOUNTER SYMPTOMS
DOUBLE VISION: 0
MYALGIAS: 0
CONSTIPATION: 1
DEPRESSION: 0
BRUISES/BLEEDS EASILY: 0
COUGH: 0
WEIGHT LOSS: 0
DIARRHEA: 0
PALPITATIONS: 0
BLURRED VISION: 0
DIZZINESS: 0
SHORTNESS OF BREATH: 0
NECK PAIN: 0
NAUSEA: 0
NAUSEA: 1
WEAKNESS: 1
FEVER: 0
POLYDIPSIA: 0
HEARTBURN: 0
ABDOMINAL PAIN: 1
HEADACHES: 0
HEMOPTYSIS: 0
VOMITING: 0
SPUTUM PRODUCTION: 0
TINGLING: 0
ORTHOPNEA: 0
CHILLS: 0
PHOTOPHOBIA: 0
BACK PAIN: 0

## 2018-12-09 ASSESSMENT — PAIN SCALES - GENERAL
PAINLEVEL_OUTOF10: 4
PAINLEVEL_OUTOF10: 8
PAINLEVEL_OUTOF10: 4
PAINLEVEL_OUTOF10: 8
PAINLEVEL_OUTOF10: 4
PAINLEVEL_OUTOF10: 9
PAINLEVEL_OUTOF10: 7
PAINLEVEL_OUTOF10: 6
PAINLEVEL_OUTOF10: 7
PAINLEVEL_OUTOF10: 8
PAINLEVEL_OUTOF10: 7
PAINLEVEL_OUTOF10: 4
PAINLEVEL_OUTOF10: 6
PAINLEVEL_OUTOF10: 10

## 2018-12-09 ASSESSMENT — LIFESTYLE VARIABLES: SUBSTANCE_ABUSE: 0

## 2018-12-09 ASSESSMENT — GAIT ASSESSMENTS
DISTANCE (FEET): 20
DEVIATION: ANTALGIC;STEP TO;INCREASED BASE OF SUPPORT;BRADYKINETIC
GAIT LEVEL OF ASSIST: CONTACT GUARD ASSIST

## 2018-12-09 ASSESSMENT — COGNITIVE AND FUNCTIONAL STATUS - GENERAL
MOBILITY SCORE: 21
STANDING UP FROM CHAIR USING ARMS: A LITTLE
WALKING IN HOSPITAL ROOM: A LITTLE
CLIMB 3 TO 5 STEPS WITH RAILING: A LITTLE
SUGGESTED CMS G CODE MODIFIER MOBILITY: CJ

## 2018-12-09 NOTE — CARE PLAN
Problem: Infection  Goal: Will remain free from infection  Outcome: PROGRESSING AS EXPECTED  No fevers or signs of worsening UTI. Continue IV zosyn as prescribed.     Problem: Discharge Barriers/Planning  Goal: Patient's continuum of care needs will be met  Outcome: PROGRESSING SLOWER THAN EXPECTED  Await PT consult for appropriate discharge needs.    Problem: Respiratory:  Goal: Respiratory status will improve  Outcome: PROGRESSING AS EXPECTED  Continue baseline 2-3L NC HS and PRN.    Problem: Pain Management  Goal: Pain level will decrease to patient's comfort goal  Outcome: PROGRESSING AS EXPECTED  Pain controlled with current medication regimen.     Problem: Urinary Elimination:  Goal: Ability to reestablish a normal urinary elimination pattern will improve  Outcome: PROGRESSING AS EXPECTED  Chronic oliguria due to ESRD.

## 2018-12-09 NOTE — THERAPY
"Physical Therapy Evaluation completed.   Bed Mobility:  Supine to Sit: Modified Independent  Transfers: Sit to Stand: Supervised  Gait: Level Of Assist: Contact Guard Assist with no AD; SBA with FWW; antalgic gait pattern with decreased balance. Improved gait with FWW .  Plan of Care: Will benefit from Physical Therapy 3 times per week  Discharge Recommendations: Equipment: No Equipment Needed. Post-acute therapy Discharge to home with home health for additional skilled therapy services.    Pt is a 39 yo female with diagnosis of abdominal pain and ESRD. Pt is presenting with impaired gait related to L hip pain (chronic) as well as compensatory gait pattern from R ARLEEN (pt reports R LE is longer than L). Pt also is c/o abd pain which has also impaired gait quality. With FWW gait quality and safety is improved, less compensatory patterns noted. Recommend pt use FWW at home at all times. Pt reports is concerned about getting self to HD when abd pain is intense, states her grandma sometimes assists. Recommend pt have increased assist at home to ensure is able to get to HD regularly. Recommend HHPT for further therapy and strengthening.     See \"Rehab Therapy-Acute\" Patient Summary Report for complete documentation.     "

## 2018-12-09 NOTE — PROGRESS NOTES
[facility] Medicine Daily Progress Note    Date of Service  12/9/2018    Chief Complaint  38 y.o. female history of end-stage renal disease on dialysis, C. difficile colitis, ESBL, fibromyalgia, hypertension, lupus, pyelonephritis admitted 12/4/2018 with complaints of abdominal pain, constipation, missed hemodialysis, uremia, hyperkalemia    Interval Problem Update    No new concerns.  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  Urine positive for some skin javy  Hypertension: well controlled    Consultants/Specialty  Nephrology    Code Status  Full code    Disposition  PT OT evaluation ordered for safe discharge    Review of Systems  Review of Systems   Constitutional: Negative for chills, fever and weight loss.   HENT: Negative for ear pain, hearing loss and tinnitus.    Eyes: Negative for blurred vision, double vision and photophobia.   Respiratory: Negative for cough, hemoptysis and sputum production.    Cardiovascular: Negative for chest pain, palpitations and orthopnea.   Gastrointestinal: Positive for abdominal pain, constipation and nausea. Negative for diarrhea, heartburn and vomiting.   Genitourinary: Negative for dysuria, frequency and urgency.   Musculoskeletal: Negative for back pain, myalgias and neck pain.   Skin: Negative for itching and rash.   Neurological: Negative for dizziness, tingling and headaches.   Endo/Heme/Allergies: Negative for environmental allergies and polydipsia. Does not bruise/bleed easily.   Psychiatric/Behavioral: Negative for depression, substance abuse and suicidal ideas.   No change in review of system    Physical Exam  Temp:  [36.1 °C (97 °F)-36.9 °C (98.5 °F)] 36.6 °C (97.8 °F)  Pulse:  [75-93] 75  Resp:  [18] 18  BP: (100-123)/(60-74) 123/71    Physical Exam   Constitutional: She appears well-developed. She has a  sickly appearance.   HENT:   Head: Normocephalic and atraumatic.   Eyes: Pupils are equal, round, and reactive to light. EOM are normal.   Neck: Normal range of motion. Neck supple.   Cardiovascular: Normal rate.    Pulmonary/Chest: Effort normal and breath sounds normal. No respiratory distress.   Abdominal: Soft. Bowel sounds are normal. There is tenderness in the epigastric area. There is no rigidity and no guarding.   Musculoskeletal: Normal range of motion.   Skin: Skin is warm and dry.   Nursing note and vitals reviewed.  No significant change in exam    Fluids    Intake/Output Summary (Last 24 hours) at 12/09/18 1551  Last data filed at 12/09/18 0900   Gross per 24 hour   Intake              240 ml   Output                0 ml   Net              240 ml       Laboratory  Recent Labs      12/07/18   0232   WBC  6.9   RBC  3.66*   HEMOGLOBIN  11.0*   HEMATOCRIT  33.6*   MCV  91.0   MCH  30.3   MCHC  33.3*   RDW  45.6   PLATELETCT  124*   MPV  12.4     Recent Labs      12/07/18   0232   SODIUM  138   POTASSIUM  3.7   CHLORIDE  97   CO2  27   GLUCOSE  99   BUN  44*   CREATININE  9.20*   CALCIUM  9.0                   Imaging  CT-ABDOMEN-PELVIS W/O   Final Result         Interstitial prominence in lung bases with pleural effusions consistent with pulmonary edema.      Anasarca and trace ascites.      No evidence of acute abdominal or pelvic pathology.      DX-CHEST-PORTABLE (1 VIEW)   Final Result      Enlarged cardiac silhouette with interstitial prominence consistent with pulmonary edema with probable left pleural fluid.           Assessment/Plan  Encephalopathy   Assessment & Plan      Probably metabolic   Current level of uremia could cause encephalopathy  Infection could cause encephalopathy.  She was started on Zosyn IV for UTI       Back to baseline     ESRD (end stage renal disease) on dialysis (HCC)- (present on admission)   Assessment & Plan    Nephrology is consulted by ed physician for dialysis  Has  missed her routine dialysis    continue daily hemodialysis until improvement in uremia    Clinically improving.  Uremia going down  Hemodialysis per nephrology 3 times a week     Gastritis   Assessment & Plan    Probably uremic  On omeprazole  , Sucralfate  Zofran as    Improved     UTI (urinary tract infection)- (present on admission)   Assessment & Plan    History of ESBL  Positive UA  Received course of Zosyn  Negative urine culture       Avascular necrosis (HCC)- (present on admission)   Assessment & Plan    Report bilateral hips.  Chronic.  Follow with orthopedics as outpatient  PT OT evaluation     AP (abdominal pain)- (present on admission)   Assessment & Plan    Most obinna 2nd to constipation, uremic gastritis  kayexelate is given to the pt  Bowel regiemen  Mag citrate if above treatment not successful  Ct of abd and pelvis showed:Interstitial prominence in lung bases with pleural effusions consistent with pulmonary edema.    Anasarca and trace ascites.    No evidence of acute abdominal or pelvic pathology.        Continue omeprazole, sucralfate added    Improved     Hyperkalemia- (present on admission)   Assessment & Plan        Hyperkalemia resolved after hemodialysis          VTE prophylaxis: Heparin

## 2018-12-09 NOTE — PROGRESS NOTES
Nephrology Daily Progress Note    Date of Service  12/9/2018    Chief Complaint  38 y.o. female admitted 12/4/2018 with ESRD, missed HD, presented with uremia    Interval Problem Update  Pt feels weak.    Review of Systems  Review of Systems   Constitutional: Negative for chills, fever and malaise/fatigue.   Respiratory: Negative for cough and shortness of breath.    Cardiovascular: Negative for chest pain and leg swelling.   Gastrointestinal: Negative for nausea and vomiting.   Genitourinary: Negative for dysuria, frequency and urgency.   Neurological: Positive for weakness.        Physical Exam  Temp:  [36.1 °C (97 °F)-36.9 °C (98.5 °F)] 36.6 °C (97.8 °F)  Pulse:  [75-93] 75  Resp:  [18] 18  BP: (100-123)/(60-74) 123/71    Physical Exam   Constitutional: No distress.   HENT:   Nose: Nose normal.   Eyes: Conjunctivae are normal. Right eye exhibits no discharge. Left eye exhibits no discharge. No scleral icterus.   Cardiovascular: Normal rate and regular rhythm.    No murmur heard.  Pulmonary/Chest: She has no wheezes. She has no rales.   Musculoskeletal: She exhibits no edema.   Skin: She is not diaphoretic.   Nursing note and vitals reviewed.      Fluids  No intake or output data in the 24 hours ending 12/09/18 1224    Laboratory  Recent Labs      12/07/18   0232   WBC  6.9   RBC  3.66*   HEMOGLOBIN  11.0*   HEMATOCRIT  33.6*   MCV  91.0   MCH  30.3   MCHC  33.3*   RDW  45.6   PLATELETCT  124*   MPV  12.4     Recent Labs      12/07/18   0232   SODIUM  138   POTASSIUM  3.7   CHLORIDE  97   CO2  27   GLUCOSE  99   BUN  44*   CREATININE  9.20*   CALCIUM  9.0                   Imaging  reviewed    Assessment/Plan  1 ESRD.  2 uremia  3 volume overload  4 HTN  5 anemia   Plan  no need for HD today  Renal dose all meds  Avoid nephrotoxins  Renal diet  D/W Dr Ho

## 2018-12-09 NOTE — PROGRESS NOTES
"Hospital Medicine Daily Progress Note    Date of Service  12/9/2018    Chief Complaint  38 y.o. female history of end-stage renal disease on dialysis, C. difficile colitis, ESBL, fibromyalgia, hypertension, lupus, pyelonephritis admitted 12/4/2018 with complaints of abdominal pain, constipation, missed hemodialysis, uremia, hyperkalemia    Interval Problem Update    Patient states that her legs \"giving up\".  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  Might need home health  UTI: On Zosyn.  Urine positive for some skin javy  Hypertension: Now well controlled    Consultants/Specialty  Nephrology    Code Status  Full code    Disposition  PT OT evaluation ordered for safe discharge    Review of Systems  Review of Systems   Constitutional: Negative for chills, fever and weight loss.   HENT: Negative for ear pain, hearing loss and tinnitus.    Eyes: Negative for blurred vision, double vision and photophobia.   Respiratory: Negative for cough, hemoptysis and sputum production.    Cardiovascular: Negative for chest pain, palpitations and orthopnea.   Gastrointestinal: Positive for abdominal pain, constipation and nausea. Negative for diarrhea, heartburn and vomiting.   Genitourinary: Negative for dysuria, frequency and urgency.   Musculoskeletal: Negative for back pain, myalgias and neck pain.   Skin: Negative for itching and rash.   Neurological: Negative for dizziness, tingling and headaches.   Endo/Heme/Allergies: Negative for environmental allergies and polydipsia. Does not bruise/bleed easily.   Psychiatric/Behavioral: Negative for depression, substance abuse and suicidal ideas.   No change in review of system    Physical Exam  Temp:  [36.1 °C (97 °F)-36.9 °C (98.5 °F)] 36.6 °C (97.8 °F)  Pulse:  [75-93] 75  Resp:  [18] 18  BP: (100-123)/(60-74) " 123/71    Physical Exam   Constitutional: She appears well-developed. She has a sickly appearance.   HENT:   Head: Normocephalic and atraumatic.   Eyes: Pupils are equal, round, and reactive to light. EOM are normal.   Neck: Normal range of motion. Neck supple.   Cardiovascular: Normal rate.    Pulmonary/Chest: Effort normal and breath sounds normal. No respiratory distress.   Abdominal: Soft. Bowel sounds are normal. There is tenderness in the epigastric area. There is no rigidity and no guarding.   Musculoskeletal: Normal range of motion.   Skin: Skin is warm and dry.   Nursing note and vitals reviewed.  No significant change in exam    Fluids    Intake/Output Summary (Last 24 hours) at 12/09/18 1544  Last data filed at 12/09/18 0900   Gross per 24 hour   Intake              240 ml   Output                0 ml   Net              240 ml       Laboratory  Recent Labs      12/07/18   0232   WBC  6.9   RBC  3.66*   HEMOGLOBIN  11.0*   HEMATOCRIT  33.6*   MCV  91.0   MCH  30.3   MCHC  33.3*   RDW  45.6   PLATELETCT  124*   MPV  12.4     Recent Labs      12/07/18   0232   SODIUM  138   POTASSIUM  3.7   CHLORIDE  97   CO2  27   GLUCOSE  99   BUN  44*   CREATININE  9.20*   CALCIUM  9.0                   Imaging  CT-ABDOMEN-PELVIS W/O   Final Result         Interstitial prominence in lung bases with pleural effusions consistent with pulmonary edema.      Anasarca and trace ascites.      No evidence of acute abdominal or pelvic pathology.      DX-CHEST-PORTABLE (1 VIEW)   Final Result      Enlarged cardiac silhouette with interstitial prominence consistent with pulmonary edema with probable left pleural fluid.           Assessment/Plan  Encephalopathy   Assessment & Plan      Probably metabolic   Current level of uremia could cause encephalopathy  Infection could cause encephalopathy.  She was started on Zosyn IV for UTI       Back to baseline     ESRD (end stage renal disease) on dialysis (HCC)- (present on admission)    Assessment & Plan    Nephrology is consulted by ed physician for dialysis  Has missed her routine dialysis    continue daily hemodialysis until improvement in uremia    Clinically improving.  Uremia going down  Hemodialysis per nephrology 3 times a week     Gastritis   Assessment & Plan    Probably uremic  On omeprazole  We will add sucralfate  Zofran as    Improved     UTI (urinary tract infection)- (present on admission)   Assessment & Plan    History of ESBL  Positive UA  Started on Zosyn  Follow with urine culture  Endorses bladder symptoms  Dysuria improved     Avascular necrosis (HCC)- (present on admission)   Assessment & Plan    Report bilateral hips.  Chronic.  Follow with orthopedics as outpatient  PT OT evaluation     AP (abdominal pain)- (present on admission)   Assessment & Plan    Most likley 2nd to constipation, uremic gastritis  kayexelate is given to the pt  Bowel regiemen  Mag citrate if above treatment not successful  Ct of abd and pelvis showed:Interstitial prominence in lung bases with pleural effusions consistent with pulmonary edema.    Anasarca and trace ascites.    No evidence of acute abdominal or pelvic pathology.        Continue omeprazole, sucralfate added    Improved     Hyperkalemia- (present on admission)   Assessment & Plan    kayexelate  Monitor on cardiac tele  ekg is noted    Hyperkalemia resolved after hemodialysis          VTE prophylaxis: Heparin

## 2018-12-09 NOTE — PROGRESS NOTES
Nephrology Daily Progress Note    Date of Service  12/8/2018    Chief Complaint  38 y.o. female admitted 12/4/2018 with ESRD, missed HD, presented with uremia    Interval Problem Update  Pt feels weak, not sure she can go home    Review of Systems  Review of Systems   Constitutional: Positive for malaise/fatigue. Negative for chills and fever.   Respiratory: Negative for cough and shortness of breath.    Cardiovascular: Negative for chest pain and leg swelling.   Gastrointestinal: Negative for nausea and vomiting.   Genitourinary: Negative for dysuria, frequency and urgency.   Neurological: Positive for weakness.        Physical Exam  Temp:  [36.3 °C (97.3 °F)-36.7 °C (98 °F)] 36.3 °C (97.3 °F)  Pulse:  [] 90  Resp:  [16-18] 18  BP: ()/(51-68) 100/68    Physical Exam   Constitutional: No distress.   HENT:   Nose: Nose normal.   Eyes: Conjunctivae are normal. Right eye exhibits no discharge. Left eye exhibits no discharge. No scleral icterus.   Cardiovascular: Normal rate and regular rhythm.    No murmur heard.  Pulmonary/Chest: She has no wheezes. She has no rales.   Musculoskeletal: She exhibits no edema.   Skin: She is not diaphoretic.   Nursing note and vitals reviewed.      Fluids    Intake/Output Summary (Last 24 hours) at 12/08/18 1947  Last data filed at 12/07/18 2000   Gross per 24 hour   Intake              120 ml   Output                0 ml   Net              120 ml       Laboratory  Recent Labs      12/07/18   0232   WBC  6.9   RBC  3.66*   HEMOGLOBIN  11.0*   HEMATOCRIT  33.6*   MCV  91.0   MCH  30.3   MCHC  33.3*   RDW  45.6   PLATELETCT  124*   MPV  12.4     Recent Labs      12/07/18   0232   SODIUM  138   POTASSIUM  3.7   CHLORIDE  97   CO2  27   GLUCOSE  99   BUN  44*   CREATININE  9.20*   CALCIUM  9.0                   Imaging  reviewed    Assessment/Plan  1 ESRD.  2 uremia  3 volume overload  4 HTN  5 anemia   Plan  no need for HD today  Daily labs  Renal diet  Renal dose all  meds  Avoid nephrotoxins  Prognosis guarded.  D/W Dr Ho

## 2018-12-09 NOTE — PROGRESS NOTES
Received report from night staff. Assumed pt care. Pain level 8/10 back pain, will be medicated per PRN orders. AOX4. POC discussed. Medical status; no tele monitoring. Call light within reach, bed in lowest position, and personal items accessible.

## 2018-12-10 ENCOUNTER — HOME HEALTH ADMISSION (OUTPATIENT)
Dept: HOME HEALTH SERVICES | Facility: HOME HEALTHCARE | Age: 38
End: 2018-12-10
Payer: MEDICARE

## 2018-12-10 LAB
BACTERIA BLD CULT: NORMAL
SIGNIFICANT IND 70042: NORMAL
SITE SITE: NORMAL
SOURCE SOURCE: NORMAL

## 2018-12-10 PROCEDURE — 700102 HCHG RX REV CODE 250 W/ 637 OVERRIDE(OP): Performed by: FAMILY MEDICINE

## 2018-12-10 PROCEDURE — 770001 HCHG ROOM/CARE - MED/SURG/GYN PRIV*

## 2018-12-10 PROCEDURE — 700111 HCHG RX REV CODE 636 W/ 250 OVERRIDE (IP): Performed by: FAMILY MEDICINE

## 2018-12-10 PROCEDURE — A9270 NON-COVERED ITEM OR SERVICE: HCPCS | Performed by: INTERNAL MEDICINE

## 2018-12-10 PROCEDURE — 90935 HEMODIALYSIS ONE EVALUATION: CPT

## 2018-12-10 PROCEDURE — 5A1D70Z PERFORMANCE OF URINARY FILTRATION, INTERMITTENT, LESS THAN 6 HOURS PER DAY: ICD-10-PCS | Performed by: INTERNAL MEDICINE

## 2018-12-10 PROCEDURE — 99239 HOSP IP/OBS DSCHRG MGMT >30: CPT | Performed by: INTERNAL MEDICINE

## 2018-12-10 PROCEDURE — 700102 HCHG RX REV CODE 250 W/ 637 OVERRIDE(OP): Performed by: INTERNAL MEDICINE

## 2018-12-10 PROCEDURE — A9270 NON-COVERED ITEM OR SERVICE: HCPCS | Performed by: FAMILY MEDICINE

## 2018-12-10 PROCEDURE — 700101 HCHG RX REV CODE 250: Performed by: INTERNAL MEDICINE

## 2018-12-10 PROCEDURE — 99232 SBSQ HOSP IP/OBS MODERATE 35: CPT | Performed by: INTERNAL MEDICINE

## 2018-12-10 RX ORDER — BACLOFEN 10 MG/1
5 TABLET ORAL 2 TIMES DAILY
Status: DISCONTINUED | OUTPATIENT
Start: 2018-12-10 | End: 2018-12-12

## 2018-12-10 RX ORDER — BACLOFEN 5 MG/1
5 TABLET ORAL 2 TIMES DAILY
Qty: 30 TAB | Refills: 0 | Status: SHIPPED | OUTPATIENT
Start: 2018-12-10 | End: 2018-12-11

## 2018-12-10 RX ADMIN — SEVELAMER CARBONATE 2400 MG: 800 TABLET, FILM COATED ORAL at 11:37

## 2018-12-10 RX ADMIN — PROMETHAZINE HYDROCHLORIDE 25 MG: 25 TABLET ORAL at 21:47

## 2018-12-10 RX ADMIN — STANDARDIZED SENNA CONCENTRATE AND DOCUSATE SODIUM 2 TABLET: 8.6; 5 TABLET, FILM COATED ORAL at 16:16

## 2018-12-10 RX ADMIN — BUPROPION HYDROCHLORIDE 150 MG: 150 TABLET, EXTENDED RELEASE ORAL at 06:10

## 2018-12-10 RX ADMIN — BACLOFEN 5 MG: 10 TABLET ORAL at 16:16

## 2018-12-10 RX ADMIN — OXYCODONE HYDROCHLORIDE 10 MG: 5 TABLET ORAL at 06:10

## 2018-12-10 RX ADMIN — SUCRALFATE 1 G: 1 TABLET ORAL at 22:52

## 2018-12-10 RX ADMIN — PREGABALIN 100 MG: 100 CAPSULE ORAL at 06:10

## 2018-12-10 RX ADMIN — SUCRALFATE 1 G: 1 TABLET ORAL at 11:38

## 2018-12-10 RX ADMIN — STANDARDIZED SENNA CONCENTRATE AND DOCUSATE SODIUM 2 TABLET: 8.6; 5 TABLET, FILM COATED ORAL at 06:10

## 2018-12-10 RX ADMIN — OXYCODONE HYDROCHLORIDE 10 MG: 5 TABLET ORAL at 21:47

## 2018-12-10 RX ADMIN — SUCRALFATE 1 G: 1 TABLET ORAL at 16:17

## 2018-12-10 RX ADMIN — OXYCODONE HYDROCHLORIDE AND ACETAMINOPHEN 500 MG: 500 TABLET ORAL at 06:10

## 2018-12-10 RX ADMIN — VITAMIN D, TAB 1000IU (100/BT) 1000 UNITS: 25 TAB at 06:10

## 2018-12-10 RX ADMIN — OXYCODONE HYDROCHLORIDE 10 MG: 5 TABLET ORAL at 16:16

## 2018-12-10 RX ADMIN — FERROUS SULFATE TAB 325 MG (65 MG ELEMENTAL FE) 325 MG: 325 (65 FE) TAB at 06:10

## 2018-12-10 RX ADMIN — SEVELAMER CARBONATE 2400 MG: 800 TABLET, FILM COATED ORAL at 06:10

## 2018-12-10 RX ADMIN — LIDOCAINE 1 PATCH: 50 PATCH TOPICAL at 16:19

## 2018-12-10 RX ADMIN — TRAZODONE HYDROCHLORIDE 50 MG: 50 TABLET ORAL at 22:52

## 2018-12-10 RX ADMIN — OXYCODONE HYDROCHLORIDE 10 MG: 5 TABLET ORAL at 11:38

## 2018-12-10 RX ADMIN — HYDROXYCHLOROQUINE SULFATE 200 MG: 200 TABLET, FILM COATED ORAL at 22:52

## 2018-12-10 RX ADMIN — SUCRALFATE 1 G: 1 TABLET ORAL at 06:10

## 2018-12-10 RX ADMIN — BACLOFEN 5 MG: 10 TABLET ORAL at 11:38

## 2018-12-10 RX ADMIN — ONDANSETRON HYDROCHLORIDE 4 MG: 2 INJECTION, SOLUTION INTRAMUSCULAR; INTRAVENOUS at 11:41

## 2018-12-10 RX ADMIN — AMLODIPINE BESYLATE 5 MG: 5 TABLET ORAL at 06:10

## 2018-12-10 RX ADMIN — OMEPRAZOLE 20 MG: 20 CAPSULE, DELAYED RELEASE ORAL at 06:09

## 2018-12-10 RX ADMIN — SEVELAMER CARBONATE 2400 MG: 800 TABLET, FILM COATED ORAL at 16:17

## 2018-12-10 ASSESSMENT — PAIN SCALES - GENERAL
PAINLEVEL_OUTOF10: 8
PAINLEVEL_OUTOF10: 1
PAINLEVEL_OUTOF10: 7
PAINLEVEL_OUTOF10: 5

## 2018-12-10 ASSESSMENT — ENCOUNTER SYMPTOMS: WEAKNESS: 1

## 2018-12-10 NOTE — DISCHARGE PLANNING
Anticipated Discharge Disposition: D/C to Home with HH    Action: LSW met with pt at bedside to discuss HH referral. Pt indicated she would be willing to try it and signed choice for [facility]. Pt's address is [address] [place] [address], [address], [place]. LSW faxed to [employer] for processing.    Barriers to Discharge: HH acceptance.    Plan: Await confirmation from [facility] that pt has been accepted for services.

## 2018-12-10 NOTE — PROGRESS NOTES
Nephrology Daily Progress Note    Date of Service  12/10/2018    Chief Complaint  38 y.o. female admitted 12/4/2018 with ESRD, anemia    Interval Problem Update  Events reviewed. Continues to feel weak.    Review of Systems  Review of Systems   Constitutional: Positive for malaise/fatigue.   Neurological: Positive for weakness.   All other systems reviewed and are negative.       Physical Exam  Temp:  [36.1 °C (97 °F)-36.8 °C (98.2 °F)] 36.6 °C (97.8 °F)  Pulse:  [62-75] 69  Resp:  [16-18] 16  BP: (108-137)/(63-76) 114/63    Physical Exam   Constitutional: She appears well-developed.   Weak appearing   HENT:   Head: Normocephalic and atraumatic.   Cardiovascular: Normal rate and regular rhythm.    Pulmonary/Chest: Effort normal and breath sounds normal.   Musculoskeletal: She exhibits edema. She exhibits no tenderness.   Skin: Skin is warm and dry.       Fluids    Intake/Output Summary (Last 24 hours) at 12/10/18 1511  Last data filed at 12/10/18 1100   Gross per 24 hour   Intake              590 ml   Output                0 ml   Net              590 ml       Laboratory  Recent Labs      12/09/18   1821   WBC  5.1   RBC  3.33*   HEMOGLOBIN  10.0*   HEMATOCRIT  31.2*   MCV  93.7   MCH  30.0   MCHC  32.1*   RDW  45.1   PLATELETCT  144*   MPV  12.4     Recent Labs      12/09/18   1821   SODIUM  133*   POTASSIUM  4.4   CHLORIDE  95*   CO2  24   GLUCOSE  100*   BUN  49*   CREATININE  10.05*   CALCIUM  8.6                   Imaging  CT-ABDOMEN-PELVIS W/O   Final Result         Interstitial prominence in lung bases with pleural effusions consistent with pulmonary edema.      Anasarca and trace ascites.      No evidence of acute abdominal or pelvic pathology.      DX-CHEST-PORTABLE (1 VIEW)   Final Result      Enlarged cardiac silhouette with interstitial prominence consistent with pulmonary edema with probable left pleural fluid.           Assessment/Plan    1. ESRD - MWF, continue at this dialysis rate  2. Volume overload  - Continue to UF, still with volume overload  3. Hyponatremia, new, will expect to improve with HD  4. Anemia of CKD - Hgb 10    -Continue to monitor  -Will follow

## 2018-12-10 NOTE — CARE PLAN
Problem: Discharge Barriers/Planning  Goal: Patient's continuum of care needs will be met    Intervention: Collaborate with Transitional Care Team and Interdisciplinary Team to meet discharge needs  CM in process of setting up [facility].      Problem: Pain Management  Goal: Pain level will decrease to patient's comfort goal  Outcome: PROGRESSING SLOWER THAN EXPECTED  Pt with breakthrough pain overnight, required additional dose of oxycodone. Resting quietly now.

## 2018-12-10 NOTE — FACE TO FACE
Face to Face Supporting Documentation - Home Health    The encounter with this patient was in whole or in part the primary reason for home health admission.    Date of encounter:   Patient:                    MRN:                       YOB: 2016  Debby Carrizales  [id number]  1980     Home health to see patient for:  Skilled Nursing care for assessment, interventions & education, Physical Therapy evaluation and treatment and Occupational therapy evaluation and treatment    Skilled need for:  Exacerbation of Chronic Disease State ESRD. non compliance with HD. BL avascular necrosis of the hips and Recent Deterioration of Health Status .    Skilled nursing interventions to include:  Comment: assesment and education    Homebound status evidenced by:  Needs the assistance of another person in order to leave the home. Leaving home requires a considerable and taxing effort. There is a normal inability to leave the home.    Community Physician to provide follow up care: Sushila Manzano M.D.     Optional Interventions? No      I certify the face to face encounter for this home health care referral meets the CMS requirements and the encounter/clinical assessment with the patient was, in whole, or in part, for the medical condition(s) listed above, which is the primary reason for home health care. Based on my clinical findings: the service(s) are medically necessary, support the need for home health care, and the homebound criteria are met.  I certify that this patient has had a face to face encounter by myself.  Renato Ho M.D. - NPI: [id number]

## 2018-12-10 NOTE — DISCHARGE PLANNING
Received Choice form at 1027  Agency/Facility Name: [facility]  Referral sent per Choice form at 1035

## 2018-12-10 NOTE — PROGRESS NOTES
Pt in tears c/o severe pain 10/10 bilateral flank. Normally takes 20mg oxycodone q4h at home. Only getting 10mg q4h in hospital. Apparently doctors trying to wean her down. Requesting a one time dose of meds for breakthrough pain. Page sent to hospitalist. Awaiting call back.    2056- Call back from stiven Heller for additional 10mg oxycodone once for breakthrough pain.

## 2018-12-10 NOTE — DISCHARGE PLANNING
Thank you for sending [facility] this referral,  It has been escalated to a Medical Supervisor in order to evaluate for Home Health appropriatness.  This issue will be resolved ASAP, but for any questions do not hesitate to call [phone].

## 2018-12-10 NOTE — DOCUMENTATION QUERY
[address] [place] [address]                                                                         Query Response Note      PATIENT:               TAYLOR WARD  ACCT #:                  [id number]  MRN:                       [id number]  :                       1980  ADMIT DATE:       2018 7:05 PM  DISCH DATE:          RESPONDING  PROVIDER #:        [id number]           CDI RESPONSE TEXT:    Acute pulmonary edema, non-cardiogenic    CDI QUERY TEXT:    Pulmonary Edema [id number]    Pulmonary edema is documented in the Medical Record. Please further specify the chronicity and type:    NOTE:  If an appropriate response is not listed below, please respond with a new note.    The patient's Clinical Indicators include:  Documented, unspecified, pulmonary edema.  Pt w/volume overload  Pt w/hx of noncompliance w/dialysis  Received dialysis daily x's 3 days  Query created by: Qian Flowers on 12/10/2018 12:22 PM        Electronically signed by:  MADIE CHOWDHURY MD 12/10/2018 3:15 PM

## 2018-12-10 NOTE — DISCHARGE PLANNING
Home Health to accept this referral pending patient's completion of appointment to establish with Neftaly Diaz M.D. scheduled for December 14, 2018 @ 9:00am.  Thank you!

## 2018-12-11 PROBLEM — N39.0 UTI (URINARY TRACT INFECTION): Status: RESOLVED | Noted: 2018-04-18 | Resolved: 2018-12-11

## 2018-12-11 PROBLEM — R10.9 AP (ABDOMINAL PAIN): Status: RESOLVED | Noted: 2017-02-07 | Resolved: 2018-12-11

## 2018-12-11 PROBLEM — G93.40 ENCEPHALOPATHY: Status: RESOLVED | Noted: 2018-12-05 | Resolved: 2018-12-11

## 2018-12-11 PROBLEM — K29.70 GASTRITIS: Status: RESOLVED | Noted: 2018-12-06 | Resolved: 2018-12-11

## 2018-12-11 PROBLEM — G93.41 METABOLIC ENCEPHALOPATHY: Status: ACTIVE | Noted: 2017-07-19

## 2018-12-11 LAB
ALBUMIN SERPL BCP-MCNC: 3.7 G/DL (ref 3.2–4.9)
ALBUMIN/GLOB SERPL: 1.1 G/DL
ALP SERPL-CCNC: 56 U/L (ref 30–99)
ALT SERPL-CCNC: 9 U/L (ref 2–50)
ANION GAP SERPL CALC-SCNC: 12 MMOL/L (ref 0–11.9)
AST SERPL-CCNC: 11 U/L (ref 12–45)
BASOPHILS # BLD AUTO: 1.2 % (ref 0–1.8)
BASOPHILS # BLD: 0.06 K/UL (ref 0–0.12)
BILIRUB SERPL-MCNC: 0.4 MG/DL (ref 0.1–1.5)
BUN SERPL-MCNC: 43 MG/DL (ref 8–22)
CALCIUM SERPL-MCNC: 9.5 MG/DL (ref 8.5–10.5)
CHLORIDE SERPL-SCNC: 96 MMOL/L (ref 96–112)
CO2 SERPL-SCNC: 31 MMOL/L (ref 20–33)
CREAT SERPL-MCNC: 8.15 MG/DL (ref 0.5–1.4)
EOSINOPHIL # BLD AUTO: 0.12 K/UL (ref 0–0.51)
EOSINOPHIL NFR BLD: 2.4 % (ref 0–6.9)
ERYTHROCYTE [DISTWIDTH] IN BLOOD BY AUTOMATED COUNT: 45.4 FL (ref 35.9–50)
GLOBULIN SER CALC-MCNC: 3.3 G/DL (ref 1.9–3.5)
GLUCOSE SERPL-MCNC: 88 MG/DL (ref 65–99)
HCT VFR BLD AUTO: 34.5 % (ref 37–47)
HGB BLD-MCNC: 11.2 G/DL (ref 12–16)
IMM GRANULOCYTES # BLD AUTO: 0.04 K/UL (ref 0–0.11)
IMM GRANULOCYTES NFR BLD AUTO: 0.8 % (ref 0–0.9)
LYMPHOCYTES # BLD AUTO: 1.38 K/UL (ref 1–4.8)
LYMPHOCYTES NFR BLD: 27.8 % (ref 22–41)
MAGNESIUM SERPL-MCNC: 2.6 MG/DL (ref 1.5–2.5)
MCH RBC QN AUTO: 30.3 PG (ref 27–33)
MCHC RBC AUTO-ENTMCNC: 32.5 G/DL (ref 33.6–35)
MCV RBC AUTO: 93.2 FL (ref 81.4–97.8)
MONOCYTES # BLD AUTO: 0.61 K/UL (ref 0–0.85)
MONOCYTES NFR BLD AUTO: 12.3 % (ref 0–13.4)
NEUTROPHILS # BLD AUTO: 2.76 K/UL (ref 2–7.15)
NEUTROPHILS NFR BLD: 55.5 % (ref 44–72)
NRBC # BLD AUTO: 0 K/UL
NRBC BLD-RTO: 0 /100 WBC
PHOSPHATE SERPL-MCNC: 6.8 MG/DL (ref 2.5–4.5)
PLATELET # BLD AUTO: 157 K/UL (ref 164–446)
PMV BLD AUTO: 11.6 FL (ref 9–12.9)
POTASSIUM SERPL-SCNC: 4.5 MMOL/L (ref 3.6–5.5)
PROT SERPL-MCNC: 7 G/DL (ref 6–8.2)
RBC # BLD AUTO: 3.7 M/UL (ref 4.2–5.4)
SODIUM SERPL-SCNC: 139 MMOL/L (ref 135–145)
WBC # BLD AUTO: 5 K/UL (ref 4.8–10.8)

## 2018-12-11 PROCEDURE — 80053 COMPREHEN METABOLIC PANEL: CPT

## 2018-12-11 PROCEDURE — G8979 MOBILITY GOAL STATUS: HCPCS | Mod: CI

## 2018-12-11 PROCEDURE — 700102 HCHG RX REV CODE 250 W/ 637 OVERRIDE(OP): Performed by: FAMILY MEDICINE

## 2018-12-11 PROCEDURE — A9270 NON-COVERED ITEM OR SERVICE: HCPCS | Performed by: FAMILY MEDICINE

## 2018-12-11 PROCEDURE — 99232 SBSQ HOSP IP/OBS MODERATE 35: CPT | Performed by: INTERNAL MEDICINE

## 2018-12-11 PROCEDURE — 85025 COMPLETE CBC W/AUTO DIFF WBC: CPT

## 2018-12-11 PROCEDURE — 99232 SBSQ HOSP IP/OBS MODERATE 35: CPT | Performed by: FAMILY MEDICINE

## 2018-12-11 PROCEDURE — 700101 HCHG RX REV CODE 250: Performed by: INTERNAL MEDICINE

## 2018-12-11 PROCEDURE — 97116 GAIT TRAINING THERAPY: CPT

## 2018-12-11 PROCEDURE — 84100 ASSAY OF PHOSPHORUS: CPT

## 2018-12-11 PROCEDURE — 700102 HCHG RX REV CODE 250 W/ 637 OVERRIDE(OP): Performed by: INTERNAL MEDICINE

## 2018-12-11 PROCEDURE — 97530 THERAPEUTIC ACTIVITIES: CPT

## 2018-12-11 PROCEDURE — 770001 HCHG ROOM/CARE - MED/SURG/GYN PRIV*

## 2018-12-11 PROCEDURE — G8980 MOBILITY D/C STATUS: HCPCS | Mod: CI

## 2018-12-11 PROCEDURE — A9270 NON-COVERED ITEM OR SERVICE: HCPCS | Performed by: INTERNAL MEDICINE

## 2018-12-11 PROCEDURE — 83735 ASSAY OF MAGNESIUM: CPT

## 2018-12-11 RX ADMIN — BACLOFEN 5 MG: 10 TABLET ORAL at 05:54

## 2018-12-11 RX ADMIN — FERROUS SULFATE TAB 325 MG (65 MG ELEMENTAL FE) 325 MG: 325 (65 FE) TAB at 05:56

## 2018-12-11 RX ADMIN — OMEPRAZOLE 20 MG: 20 CAPSULE, DELAYED RELEASE ORAL at 05:56

## 2018-12-11 RX ADMIN — OXYCODONE HYDROCHLORIDE AND ACETAMINOPHEN 500 MG: 500 TABLET ORAL at 05:56

## 2018-12-11 RX ADMIN — SUCRALFATE 1 G: 1 TABLET ORAL at 05:54

## 2018-12-11 RX ADMIN — SEVELAMER CARBONATE 2400 MG: 800 TABLET, FILM COATED ORAL at 11:35

## 2018-12-11 RX ADMIN — SUCRALFATE 1 G: 1 TABLET ORAL at 11:35

## 2018-12-11 RX ADMIN — AMLODIPINE BESYLATE 5 MG: 5 TABLET ORAL at 05:56

## 2018-12-11 RX ADMIN — VITAMIN D, TAB 1000IU (100/BT) 1000 UNITS: 25 TAB at 05:55

## 2018-12-11 RX ADMIN — SEVELAMER CARBONATE 2400 MG: 800 TABLET, FILM COATED ORAL at 17:27

## 2018-12-11 RX ADMIN — BUPROPION HYDROCHLORIDE 150 MG: 150 TABLET, EXTENDED RELEASE ORAL at 05:54

## 2018-12-11 RX ADMIN — STANDARDIZED SENNA CONCENTRATE AND DOCUSATE SODIUM 2 TABLET: 8.6; 5 TABLET, FILM COATED ORAL at 05:53

## 2018-12-11 RX ADMIN — SEVELAMER CARBONATE 2400 MG: 800 TABLET, FILM COATED ORAL at 05:52

## 2018-12-11 RX ADMIN — PREGABALIN 100 MG: 100 CAPSULE ORAL at 05:55

## 2018-12-11 RX ADMIN — OXYCODONE HYDROCHLORIDE 10 MG: 5 TABLET ORAL at 06:09

## 2018-12-11 RX ADMIN — LIDOCAINE 1 PATCH: 50 PATCH TOPICAL at 17:32

## 2018-12-11 RX ADMIN — SUCRALFATE 1 G: 1 TABLET ORAL at 17:27

## 2018-12-11 ASSESSMENT — PAIN SCALES - GENERAL
PAINLEVEL_OUTOF10: 7
PAINLEVEL_OUTOF10: 4
PAINLEVEL_OUTOF10: 6
PAINLEVEL_OUTOF10: 7
PAINLEVEL_OUTOF10: 7
PAINLEVEL_OUTOF10: 5

## 2018-12-11 ASSESSMENT — ENCOUNTER SYMPTOMS
NAUSEA: 0
DEPRESSION: 0
MYALGIAS: 0
CHILLS: 0
VOMITING: 0
BLURRED VISION: 0
BACK PAIN: 0
NECK PAIN: 0
HEMOPTYSIS: 0
ORTHOPNEA: 0
SPUTUM PRODUCTION: 0
HEADACHES: 0
COUGH: 0
DOUBLE VISION: 0
DIARRHEA: 0
HEARTBURN: 0
BRUISES/BLEEDS EASILY: 0
PALPITATIONS: 0
FEVER: 0
CLAUDICATION: 0
CONSTIPATION: 1
DIZZINESS: 0
WEAKNESS: 1
ABDOMINAL PAIN: 1

## 2018-12-11 ASSESSMENT — COGNITIVE AND FUNCTIONAL STATUS - GENERAL
TURNING FROM BACK TO SIDE WHILE IN FLAT BAD: UNABLE
WALKING IN HOSPITAL ROOM: A LOT
MOVING FROM LYING ON BACK TO SITTING ON SIDE OF FLAT BED: UNABLE
SUGGESTED CMS G CODE MODIFIER MOBILITY: CM
STANDING UP FROM CHAIR USING ARMS: A LOT
MOBILITY SCORE: 9
MOVING TO AND FROM BED TO CHAIR: UNABLE
CLIMB 3 TO 5 STEPS WITH RAILING: A LOT

## 2018-12-11 ASSESSMENT — GAIT ASSESSMENTS: GAIT LEVEL OF ASSIST: UNABLE TO PARTICIPATE

## 2018-12-11 NOTE — CARE PLAN
Problem: Infection  Goal: Will remain free from infection  Outcome: PROGRESSING AS EXPECTED  Patient will verbalize signs and symptoms of infection, interventions that can prevent infection, and when to notify a healthcare provider regarding signs and symptoms of infection.    Problem: Knowledge Deficit  Goal: Knowledge of disease process/condition, treatment plan, diagnostic tests, and medications will improve  Outcome: PROGRESSING AS EXPECTED  Knowledge of disease process, current condition, plan of care, medications, and diagnostics will improve.

## 2018-12-11 NOTE — PROGRESS NOTES
Received report and care assumed. Patient in dialysis currently, will assess when back on floor.

## 2018-12-11 NOTE — PROGRESS NOTES
Bedside report received. No overnight events per night RN. Patient A&O x 4. Lethargic. RA. Diminished at bases. Complains of lower back pain will medicate per MAR. POC discussed with patient. Pt verbalizes understanding. Call light and belongings with in reach. Bed locked and in lowest position, alarm and fall precautions in place.

## 2018-12-11 NOTE — PROGRESS NOTES
Nephrology Daily Progress Note    Date of Service  12/11/2018    Chief Complaint  38 y.o. female admitted 12/4/2018 with ESRD, anemia    Interval Problem Update  On dialysis on a MWF schedule. Last HD yesterday.  Sleepy this AM.  S/p HD yesterday      Review of Systems  Review of Systems   Constitutional: Positive for malaise/fatigue.   Neurological: Positive for weakness.   All other systems reviewed and are negative.       Physical Exam  Temp:  [36.1 °C (97 °F)-36.3 °C (97.4 °F)] 36.2 °C (97.1 °F)  Pulse:  [69-86] 69  Resp:  [16-17] 16  BP: (113-127)/(72-84) 116/73    Physical Exam   Constitutional: She is oriented to person, place, and time. She appears well-developed.   sleepy   HENT:   Head: Normocephalic and atraumatic.   Eyes: Pupils are equal, round, and reactive to light. Conjunctivae are normal.   Cardiovascular: Normal rate, regular rhythm and normal heart sounds.    Pulmonary/Chest: Effort normal and breath sounds normal.   Abdominal: Soft. Bowel sounds are normal.   Neurological: She is alert and oriented to person, place, and time.   Skin: Skin is warm and dry. She is not diaphoretic.       Fluids    Intake/Output Summary (Last 24 hours) at 12/11/18 0756  Last data filed at 12/10/18 2114   Gross per 24 hour   Intake              740 ml   Output             3000 ml   Net            -2260 ml       Laboratory  Recent Labs      12/09/18   1821   WBC  5.1   RBC  3.33*   HEMOGLOBIN  10.0*   HEMATOCRIT  31.2*   MCV  93.7   MCH  30.0   MCHC  32.1*   RDW  45.1   PLATELETCT  144*   MPV  12.4     Recent Labs      12/09/18   1821   SODIUM  133*   POTASSIUM  4.4   CHLORIDE  95*   CO2  24   GLUCOSE  100*   BUN  49*   CREATININE  10.05*   CALCIUM  8.6                   Imaging  CT-ABDOMEN-PELVIS W/O   Final Result         Interstitial prominence in lung bases with pleural effusions consistent with pulmonary edema.      Anasarca and trace ascites.      No evidence of acute abdominal or pelvic pathology.       DX-CHEST-PORTABLE (1 VIEW)   Final Result      Enlarged cardiac silhouette with interstitial prominence consistent with pulmonary edema with probable left pleural fluid.           Assessment/Plan    1. ESRD - no MWF schedule, no need for dialysis today  2. Volume overload - back at baseline  3. Hyponatremia, no labs this AM  4. Anemia of CKD - on iron    -stable for discharge from a renal standpoint  -Will follow up in outpatient dialysis

## 2018-12-12 ENCOUNTER — APPOINTMENT (OUTPATIENT)
Dept: RADIOLOGY | Facility: MEDICAL CENTER | Age: 38
DRG: 091 | End: 2018-12-12
Attending: FAMILY MEDICINE
Payer: MEDICARE

## 2018-12-12 PROBLEM — E87.3 METABOLIC ALKALOSIS: Status: ACTIVE | Noted: 2018-12-12

## 2018-12-12 LAB
ALBUMIN SERPL BCP-MCNC: 3.8 G/DL (ref 3.2–4.9)
ALBUMIN/GLOB SERPL: 1.1 G/DL
ALP SERPL-CCNC: 58 U/L (ref 30–99)
ALT SERPL-CCNC: 13 U/L (ref 2–50)
AMMONIA PLAS-SCNC: 52 UMOL/L (ref 11–45)
ANION GAP SERPL CALC-SCNC: 20 MMOL/L (ref 0–11.9)
AST SERPL-CCNC: 18 U/L (ref 12–45)
BASE EXCESS BLDA CALC-SCNC: 0 MMOL/L (ref -4–3)
BILIRUB SERPL-MCNC: 0.5 MG/DL (ref 0.1–1.5)
BODY TEMPERATURE: ABNORMAL CENTIGRADE
BUN SERPL-MCNC: 52 MG/DL (ref 8–22)
CALCIUM SERPL-MCNC: 9.9 MG/DL (ref 8.5–10.5)
CHLORIDE SERPL-SCNC: 98 MMOL/L (ref 96–112)
CO2 SERPL-SCNC: 20 MMOL/L (ref 20–33)
CREAT SERPL-MCNC: 9.76 MG/DL (ref 0.5–1.4)
GLOBULIN SER CALC-MCNC: 3.5 G/DL (ref 1.9–3.5)
GLUCOSE BLD-MCNC: 87 MG/DL (ref 65–99)
GLUCOSE SERPL-MCNC: 80 MG/DL (ref 65–99)
HCO3 BLDA-SCNC: 22 MMOL/L (ref 17–25)
PCO2 BLDA: 27.8 MMHG (ref 26–37)
PH BLDA: 7.52 [PH] (ref 7.4–7.5)
PO2 BLDA: 73.3 MMHG (ref 64–87)
POTASSIUM SERPL-SCNC: 5.6 MMOL/L (ref 3.6–5.5)
PROT SERPL-MCNC: 7.3 G/DL (ref 6–8.2)
SAO2 % BLDA: 92.3 % (ref 93–99)
SODIUM SERPL-SCNC: 138 MMOL/L (ref 135–145)

## 2018-12-12 PROCEDURE — 700102 HCHG RX REV CODE 250 W/ 637 OVERRIDE(OP): Performed by: FAMILY MEDICINE

## 2018-12-12 PROCEDURE — 90935 HEMODIALYSIS ONE EVALUATION: CPT

## 2018-12-12 PROCEDURE — 99233 SBSQ HOSP IP/OBS HIGH 50: CPT | Performed by: FAMILY MEDICINE

## 2018-12-12 PROCEDURE — 700101 HCHG RX REV CODE 250: Performed by: INTERNAL MEDICINE

## 2018-12-12 PROCEDURE — 99232 SBSQ HOSP IP/OBS MODERATE 35: CPT | Performed by: INTERNAL MEDICINE

## 2018-12-12 PROCEDURE — 80053 COMPREHEN METABOLIC PANEL: CPT

## 2018-12-12 PROCEDURE — 770001 HCHG ROOM/CARE - MED/SURG/GYN PRIV*

## 2018-12-12 PROCEDURE — 700111 HCHG RX REV CODE 636 W/ 250 OVERRIDE (IP): Performed by: FAMILY MEDICINE

## 2018-12-12 PROCEDURE — 82962 GLUCOSE BLOOD TEST: CPT

## 2018-12-12 PROCEDURE — 82803 BLOOD GASES ANY COMBINATION: CPT

## 2018-12-12 PROCEDURE — 82140 ASSAY OF AMMONIA: CPT

## 2018-12-12 PROCEDURE — 70450 CT HEAD/BRAIN W/O DYE: CPT

## 2018-12-12 PROCEDURE — 51798 US URINE CAPACITY MEASURE: CPT

## 2018-12-12 PROCEDURE — 5A1D70Z PERFORMANCE OF URINARY FILTRATION, INTERMITTENT, LESS THAN 6 HOURS PER DAY: ICD-10-PCS | Performed by: INTERNAL MEDICINE

## 2018-12-12 PROCEDURE — A9270 NON-COVERED ITEM OR SERVICE: HCPCS | Performed by: FAMILY MEDICINE

## 2018-12-12 RX ORDER — NALOXONE HYDROCHLORIDE 0.4 MG/ML
0.4 INJECTION, SOLUTION INTRAMUSCULAR; INTRAVENOUS; SUBCUTANEOUS
Status: DISCONTINUED | OUTPATIENT
Start: 2018-12-12 | End: 2018-12-17 | Stop reason: HOSPADM

## 2018-12-12 RX ORDER — LACTULOSE 20 G/30ML
30 SOLUTION ORAL 2 TIMES DAILY
Status: DISCONTINUED | OUTPATIENT
Start: 2018-12-12 | End: 2018-12-14

## 2018-12-12 RX ORDER — NALOXONE HYDROCHLORIDE 0.4 MG/ML
0.4 INJECTION, SOLUTION INTRAMUSCULAR; INTRAVENOUS; SUBCUTANEOUS ONCE
Status: COMPLETED | OUTPATIENT
Start: 2018-12-12 | End: 2018-12-12

## 2018-12-12 RX ADMIN — NALOXONE HYDROCHLORIDE 0.4 MG: 0.4 INJECTION, SOLUTION INTRAMUSCULAR; INTRAVENOUS; SUBCUTANEOUS at 16:04

## 2018-12-12 RX ADMIN — LIDOCAINE 1 PATCH: 50 PATCH TOPICAL at 16:07

## 2018-12-12 ASSESSMENT — ENCOUNTER SYMPTOMS
CONSTIPATION: 1
HEARTBURN: 0
BLURRED VISION: 0
CHILLS: 0
DIZZINESS: 0
PHOTOPHOBIA: 0
HEADACHES: 0
ORTHOPNEA: 0
WEAKNESS: 1
DOUBLE VISION: 0
TINGLING: 0
PALPITATIONS: 0
NECK PAIN: 0
BACK PAIN: 0
DEPRESSION: 0
BRUISES/BLEEDS EASILY: 0
MYALGIAS: 0
COUGH: 0
WEIGHT LOSS: 0
NAUSEA: 0
VOMITING: 0
FEVER: 0
ABDOMINAL PAIN: 1
DIARRHEA: 0
HEMOPTYSIS: 0

## 2018-12-12 ASSESSMENT — PAIN SCALES - GENERAL: PAINLEVEL_OUTOF10: 6

## 2018-12-12 NOTE — PROGRESS NOTES
Nephrology Daily Progress Note    Date of Service  12/12/2018    Chief Complaint  38 y.o. female admitted 12/4/2018 with ESRD, anemia    Interval Problem Update  Quite lethargic today  Difficulty answering questions and waking up, though she will answer once she wakes up    Review of Systems  Review of Systems   Constitutional: Positive for malaise/fatigue.   Neurological: Positive for weakness.   All other systems reviewed and are negative.       Physical Exam  Temp:  [36.2 °C (97.1 °F)-36.8 °C (98.2 °F)] 36.5 °C (97.7 °F)  Pulse:  [60-77] 60  Resp:  [14-18] 16  BP: (116-143)/(73-90) 133/90    Physical Exam   Constitutional: She appears well-developed.   Lethargic and sleepy   Cardiovascular: Normal rate and regular rhythm.    Pulmonary/Chest: Effort normal and breath sounds normal.   Musculoskeletal: She exhibits no edema or tenderness.   Skin: Skin is warm and dry. She is not diaphoretic.       Fluids    Intake/Output Summary (Last 24 hours) at 12/12/18 0957  Last data filed at 12/12/18 0818   Gross per 24 hour   Intake                0 ml   Output                0 ml   Net                0 ml       Laboratory  Recent Labs      12/09/18   1821  12/11/18   1033   WBC  5.1  5.0   RBC  3.33*  3.70*   HEMOGLOBIN  10.0*  11.2*   HEMATOCRIT  31.2*  34.5*   MCV  93.7  93.2   MCH  30.0  30.3   MCHC  32.1*  32.5*   RDW  45.1  45.4   PLATELETCT  144*  157*   MPV  12.4  11.6     Recent Labs      12/09/18   1821  12/11/18   1033  12/12/18   0803   SODIUM  133*  139  138   POTASSIUM  4.4  4.5  5.6*   CHLORIDE  95*  96  98   CO2  24  31  20   GLUCOSE  100*  88  80   BUN  49*  43*  52*   CREATININE  10.05*  8.15*  9.76*   CALCIUM  8.6  9.5  9.9                   Imaging  CT-ABDOMEN-PELVIS W/O   Final Result         Interstitial prominence in lung bases with pleural effusions consistent with pulmonary edema.      Anasarca and trace ascites.      No evidence of acute abdominal or pelvic pathology.      DX-CHEST-PORTABLE (1 VIEW)    Final Result      Enlarged cardiac silhouette with interstitial prominence consistent with pulmonary edema with probable left pleural fluid.           Assessment/Plan    1. ESRD -dialysis today  2. Volume overload -UF as tolerated  3.  Anemia of chronic kidney disease, at goal    -stable for discharge from a renal standpoint if mental status improves  -Will follow up in outpatient dialysis

## 2018-12-12 NOTE — PROGRESS NOTES
Patient arousable to verbal stimulus, but does not keep awake enough to warrant administering medications at this moment, will try again later.

## 2018-12-12 NOTE — PROGRESS NOTES
Patient still somnolent/lethargic, patient wakes up with stimulation and moves all extremeties purposefully. VSS. Patient states she is in pain, however will not attempt to stay awake long enough to administer medications.

## 2018-12-12 NOTE — PROGRESS NOTES
"Patient still lethargic, will open eyes spontaneously to verbal cues, but mostly responds in one word responses. Patient states, \"yes\" when asked where she knows she is, but does not elaborate where she is. Will continue to monitor.   "

## 2018-12-12 NOTE — PROGRESS NOTES
Bedside report received. Patient is very lethargic and unable to answer orientation questions, arousable to verbal stimuli. VSS. 95% on room air. MD notified and at bedside, new orders to check ammonia and CMP. Pt scheduled for dialysis today. POC discussed with patient. Pt verbalizes understanding. Call light and belongings with in reach. Bed locked and in lowest position, alarm and fall precautions in place.

## 2018-12-12 NOTE — PROGRESS NOTES
[facility] Medicine Daily Progress Note    Date of Service  12/11/2018    Chief Complaint  38 y.o. female history of end-stage renal disease on dialysis, C. difficile colitis, ESBL, fibromyalgia, hypertension, lupus, pyelonephritis admitted 12/4/2018 with complaints of abdominal pain, constipation, missed hemodialysis, uremia, hyperkalemia    Interval Problem Update    Patient was lethargic this morning.  Was attributed to oxycodone that she is receiving frequently.  Mental status gradually improved.  Abdominal pain fairly controlled.  No acute distress noted.    Consultants/Specialty  Nephrology    Code Status  Full code    Disposition  Discharged home    Review of Systems  Review of Systems   Constitutional: Negative for chills and fever.   HENT: Negative for ear pain, hearing loss and tinnitus.    Eyes: Negative for blurred vision and double vision.   Respiratory: Negative for cough, hemoptysis and sputum production.    Cardiovascular: Negative for chest pain, palpitations, orthopnea and claudication.   Gastrointestinal: Positive for abdominal pain (Chronic) and constipation (Chronic). Negative for diarrhea, heartburn, nausea and vomiting.   Genitourinary: Negative for dysuria and urgency.   Musculoskeletal: Negative for back pain, myalgias and neck pain.   Skin: Negative for rash.   Neurological: Negative for dizziness and headaches.   Endo/Heme/Allergies: Negative for environmental allergies. Does not bruise/bleed easily.   Psychiatric/Behavioral: Negative for depression and suicidal ideas.   No change in review of system    Physical Exam  Temp:  [36.1 °C (97 °F)-36.7 °C (98 °F)] 36.5 °C (97.7 °F)  Pulse:  [63-86] 65  Resp:  [14-16] 14  BP: ()/(72-84) 117/76    Physical Exam   Constitutional: She is oriented to person, place, and time. She appears lethargic.  Non-toxic appearance. She does not have a sickly appearance. No distress.   HENT:   Head: Normocephalic and atraumatic.   Mouth/Throat: No  oropharyngeal exudate.   Eyes: Pupils are equal, round, and reactive to light. EOM are normal. No scleral icterus.   Neck: Normal range of motion. Neck supple. No thyromegaly present.   Cardiovascular: Normal rate.  Exam reveals no gallop and no friction rub.    Pulmonary/Chest: Effort normal and breath sounds normal. No respiratory distress.   Abdominal: Soft. Bowel sounds are normal. She exhibits no distension. There is no tenderness. There is no rigidity.   Musculoskeletal: Normal range of motion. She exhibits no tenderness or deformity.   Neurological: She is oriented to person, place, and time. She appears lethargic.   Skin: Skin is warm.   Psychiatric: She is withdrawn.   Nursing note and vitals reviewed.  No significant change in exam    Fluids    Intake/Output Summary (Last 24 hours) at 12/11/18 1953  Last data filed at 12/10/18 2114   Gross per 24 hour   Intake              500 ml   Output             3000 ml   Net            -2500 ml       Laboratory  Recent Labs      12/09/18   1821  12/11/18   1033   WBC  5.1  5.0   RBC  3.33*  3.70*   HEMOGLOBIN  10.0*  11.2*   HEMATOCRIT  31.2*  34.5*   MCV  93.7  93.2   MCH  30.0  30.3   MCHC  32.1*  32.5*   RDW  45.1  45.4   PLATELETCT  144*  157*   MPV  12.4  11.6     Recent Labs      12/09/18   1821  12/11/18   1033   SODIUM  133*  139   POTASSIUM  4.4  4.5   CHLORIDE  95*  96   CO2  24  31   GLUCOSE  100*  88   BUN  49*  43*   CREATININE  10.05*  8.15*   CALCIUM  8.6  9.5                   Imaging  CT-ABDOMEN-PELVIS W/O   Final Result         Interstitial prominence in lung bases with pleural effusions consistent with pulmonary edema.      Anasarca and trace ascites.      No evidence of acute abdominal or pelvic pathology.      DX-CHEST-PORTABLE (1 VIEW)   Final Result      Enlarged cardiac silhouette with interstitial prominence consistent with pulmonary edema with probable left pleural fluid.           Assessment/Plan  Drug-seeking behavior- (present on  admission)   Assessment & Plan    Patient was counseled.     Metabolic encephalopathy- (present on admission)   Assessment & Plan    Likely secondary to polypharmacy and narcotics on board in the light of end-stage renal disease.  Now resolved.     ESRD (end stage renal disease) on dialysis (HCC)- (present on admission)   Assessment & Plan    Nephrology is consulted by ed physician for dialysis  Has missed her routine dialysis    continue daily hemodialysis until improvement in uremia    Clinically improving.    Hemodialysis per nephrology 3 times a week     Avascular necrosis (HCC)- (present on admission)   Assessment & Plan    Report bilateral hips.  Chronic.  Follow with orthopedics as outpatient  PT OT evaluation     Plan of care discussed with multidisciplinary team during rounds.    VTE prophylaxis: Heparin

## 2018-12-12 NOTE — PROGRESS NOTES
Patient continues to be increasingly lethargic throughout the day with bouts of clarity. VSS. O2 sat's  mid 90's on room air. Will hold off on discharge at this time. MD paged for updates.

## 2018-12-12 NOTE — PROGRESS NOTES
Patient A&Ox3-4, however still very lethargic. Dr. Fallon called and updated on patient's continued lethargy. No orders received, will continue to monitor.

## 2018-12-12 NOTE — PROGRESS NOTES
Received report and care assumed. Patient A&Ox 4, however is lethargic and somnolent. Pt. Reports pain, but will hold off on pharmaceutical interventions due to lethargy. Work of breathing even and unlabored on room air. Discussed POC. Call light within reach and pt. instructed to call when in need of assistance.  Denies any other needs at this time.

## 2018-12-13 ENCOUNTER — APPOINTMENT (OUTPATIENT)
Dept: RADIOLOGY | Facility: MEDICAL CENTER | Age: 38
DRG: 091 | End: 2018-12-13
Attending: FAMILY MEDICINE
Payer: MEDICARE

## 2018-12-13 PROBLEM — G40.901 STATUS EPILEPTICUS (HCC): Status: ACTIVE | Noted: 2018-12-13

## 2018-12-13 LAB
ALBUMIN SERPL BCP-MCNC: 3.8 G/DL (ref 3.2–4.9)
ALBUMIN/GLOB SERPL: 1.1 G/DL
ALP SERPL-CCNC: 63 U/L (ref 30–99)
ALT SERPL-CCNC: 14 U/L (ref 2–50)
ANION GAP SERPL CALC-SCNC: 14 MMOL/L (ref 0–11.9)
AST SERPL-CCNC: 15 U/L (ref 12–45)
BASOPHILS # BLD AUTO: 0.6 % (ref 0–1.8)
BASOPHILS # BLD: 0.04 K/UL (ref 0–0.12)
BILIRUB SERPL-MCNC: 0.6 MG/DL (ref 0.1–1.5)
BUN SERPL-MCNC: 34 MG/DL (ref 8–22)
CALCIUM SERPL-MCNC: 9.9 MG/DL (ref 8.5–10.5)
CHLORIDE SERPL-SCNC: 98 MMOL/L (ref 96–112)
CO2 SERPL-SCNC: 26 MMOL/L (ref 20–33)
CREAT SERPL-MCNC: 6.85 MG/DL (ref 0.5–1.4)
EOSINOPHIL # BLD AUTO: 0.03 K/UL (ref 0–0.51)
EOSINOPHIL NFR BLD: 0.4 % (ref 0–6.9)
ERYTHROCYTE [DISTWIDTH] IN BLOOD BY AUTOMATED COUNT: 43.8 FL (ref 35.9–50)
GLOBULIN SER CALC-MCNC: 3.5 G/DL (ref 1.9–3.5)
GLUCOSE SERPL-MCNC: 93 MG/DL (ref 65–99)
HCT VFR BLD AUTO: 35 % (ref 37–47)
HGB BLD-MCNC: 11.7 G/DL (ref 12–16)
IMM GRANULOCYTES # BLD AUTO: 0.03 K/UL (ref 0–0.11)
IMM GRANULOCYTES NFR BLD AUTO: 0.4 % (ref 0–0.9)
LYMPHOCYTES # BLD AUTO: 1.65 K/UL (ref 1–4.8)
LYMPHOCYTES NFR BLD: 24.6 % (ref 22–41)
MCH RBC QN AUTO: 30.2 PG (ref 27–33)
MCHC RBC AUTO-ENTMCNC: 33.4 G/DL (ref 33.6–35)
MCV RBC AUTO: 90.4 FL (ref 81.4–97.8)
MONOCYTES # BLD AUTO: 0.69 K/UL (ref 0–0.85)
MONOCYTES NFR BLD AUTO: 10.3 % (ref 0–13.4)
NEUTROPHILS # BLD AUTO: 4.26 K/UL (ref 2–7.15)
NEUTROPHILS NFR BLD: 63.7 % (ref 44–72)
NRBC # BLD AUTO: 0 K/UL
NRBC BLD-RTO: 0 /100 WBC
PLATELET # BLD AUTO: 158 K/UL (ref 164–446)
PMV BLD AUTO: 11.3 FL (ref 9–12.9)
POTASSIUM SERPL-SCNC: 4.4 MMOL/L (ref 3.6–5.5)
PROT SERPL-MCNC: 7.3 G/DL (ref 6–8.2)
RBC # BLD AUTO: 3.87 M/UL (ref 4.2–5.4)
SODIUM SERPL-SCNC: 138 MMOL/L (ref 135–145)
WBC # BLD AUTO: 6.7 K/UL (ref 4.8–10.8)

## 2018-12-13 PROCEDURE — 85025 COMPLETE CBC W/AUTO DIFF WBC: CPT

## 2018-12-13 PROCEDURE — 700101 HCHG RX REV CODE 250: Performed by: INTERNAL MEDICINE

## 2018-12-13 PROCEDURE — 700105 HCHG RX REV CODE 258: Performed by: FAMILY MEDICINE

## 2018-12-13 PROCEDURE — 95951 EEG-24 HR: CPT | Mod: 26 | Performed by: PSYCHIATRY & NEUROLOGY

## 2018-12-13 PROCEDURE — 80053 COMPREHEN METABOLIC PANEL: CPT

## 2018-12-13 PROCEDURE — 700111 HCHG RX REV CODE 636 W/ 250 OVERRIDE (IP): Performed by: FAMILY MEDICINE

## 2018-12-13 PROCEDURE — 770022 HCHG ROOM/CARE - ICU (200)

## 2018-12-13 PROCEDURE — 99233 SBSQ HOSP IP/OBS HIGH 50: CPT | Performed by: FAMILY MEDICINE

## 2018-12-13 PROCEDURE — 99221 1ST HOSP IP/OBS SF/LOW 40: CPT | Mod: 25 | Performed by: PSYCHIATRY & NEUROLOGY

## 2018-12-13 PROCEDURE — 4A10X4Z MONITORING OF CENTRAL NERVOUS ELECTRICAL ACTIVITY, EXTERNAL APPROACH: ICD-10-PCS | Performed by: PSYCHIATRY & NEUROLOGY

## 2018-12-13 RX ORDER — SODIUM CHLORIDE 9 MG/ML
INJECTION, SOLUTION INTRAVENOUS
Status: ACTIVE
Start: 2018-12-13 | End: 2018-12-14

## 2018-12-13 RX ORDER — LORAZEPAM 2 MG/ML
2 INJECTION INTRAMUSCULAR ONCE
Status: COMPLETED | OUTPATIENT
Start: 2018-12-13 | End: 2018-12-13

## 2018-12-13 RX ORDER — MIDAZOLAM HYDROCHLORIDE 1 MG/ML
2 INJECTION INTRAMUSCULAR; INTRAVENOUS
Status: COMPLETED | OUTPATIENT
Start: 2018-12-13 | End: 2018-12-14

## 2018-12-13 RX ADMIN — SODIUM CHLORIDE 1000 MG: 9 INJECTION, SOLUTION INTRAVENOUS at 20:24

## 2018-12-13 RX ADMIN — LORAZEPAM 2 MG: 2 INJECTION INTRAMUSCULAR at 11:45

## 2018-12-13 RX ADMIN — SODIUM CHLORIDE 1500 MG: 9 INJECTION, SOLUTION INTRAVENOUS at 11:14

## 2018-12-13 RX ADMIN — LIDOCAINE 1 PATCH: 50 PATCH TOPICAL at 18:11

## 2018-12-13 NOTE — PROGRESS NOTES
Received report and care assumed. Unable to assess orientation as patient currently too lethargic. Work of breathing even and unlabored on room air. Discussed POC. Call light within reach and pt. instructed to call when in need of assistance.  Denies any other needs at this time.

## 2018-12-13 NOTE — PROGRESS NOTES
[facility] Medicine Daily Progress Note    Date of Service  12/12/2018    Chief Complaint  38 y.o. female history of end-stage renal disease on dialysis, C. difficile colitis, ESBL, fibromyalgia, hypertension, lupus, pyelonephritis admitted 12/4/2018 with complaints of abdominal pain, constipation, missed hemodialysis, uremia, hyperkalemia    Interval Problem Update    Patient still lethargic.  Opens eyes to verbal and physical stimulus but drift back quickly to sleep.  Responded well to 1 dose of Narcan.  Also facial twitches noted by nursing staff.    Consultants/Specialty  Nephrology    Code Status  Full code    Disposition  Likely home    Review of Systems  Review of Systems   Constitutional: Negative for chills, fever and weight loss.   HENT: Negative for hearing loss and tinnitus.    Eyes: Negative for blurred vision, double vision and photophobia.   Respiratory: Negative for cough and hemoptysis.    Cardiovascular: Negative for chest pain, palpitations and orthopnea.   Gastrointestinal: Positive for abdominal pain (Chronic) and constipation (Chronic). Negative for diarrhea, heartburn, nausea and vomiting.   Genitourinary: Negative for dysuria and urgency.   Musculoskeletal: Negative for back pain, myalgias and neck pain.   Skin: Negative for rash.   Neurological: Negative for dizziness, tingling and headaches.   Endo/Heme/Allergies: Negative for environmental allergies. Does not bruise/bleed easily.   Psychiatric/Behavioral: Negative for depression and suicidal ideas.   No change in review of system    Physical Exam  Temp:  [36.2 °C (97.1 °F)-36.8 °C (98.2 °F)] 36.8 °C (98.2 °F)  Pulse:  [60-78] 78  Resp:  [14-18] 14  BP: (110-143)/(61-90) 110/61    Physical Exam   Constitutional: She appears toxic. She has a sickly appearance. No distress.   HENT:   Head: Normocephalic and atraumatic.   Eyes: Pupils are equal, round, and reactive to light. Conjunctivae are normal. No scleral icterus.   Neck: Normal range of  motion. Neck supple. No thyromegaly present.   Cardiovascular: Normal rate.  Exam reveals no gallop and no friction rub.    Pulmonary/Chest: Effort normal and breath sounds normal. No respiratory distress. She has no wheezes.   Abdominal: Soft. Bowel sounds are normal. She exhibits no distension. There is no tenderness. There is no rigidity and no rebound.   Musculoskeletal: Normal range of motion. She exhibits no tenderness or deformity.   Neurological: She is unresponsive.   Skin: Skin is warm. She is not diaphoretic.   Psychiatric: She is slowed and withdrawn.   Nursing note and vitals reviewed.  No significant change in exam    Fluids    Intake/Output Summary (Last 24 hours) at 12/12/18 1847  Last data filed at 12/12/18 0818   Gross per 24 hour   Intake                0 ml   Output                0 ml   Net                0 ml       Laboratory  Recent Labs      12/11/18   1033   WBC  5.0   RBC  3.70*   HEMOGLOBIN  11.2*   HEMATOCRIT  34.5*   MCV  93.2   MCH  30.3   MCHC  32.5*   RDW  45.4   PLATELETCT  157*   MPV  11.6     Recent Labs      12/11/18   1033  12/12/18   0803   SODIUM  139  138   POTASSIUM  4.5  5.6*   CHLORIDE  96  98   CO2  31  20   GLUCOSE  88  80   BUN  43*  52*   CREATININE  8.15*  9.76*   CALCIUM  9.5  9.9                   Imaging  CT-ABDOMEN-PELVIS W/O   Final Result         Interstitial prominence in lung bases with pleural effusions consistent with pulmonary edema.      Anasarca and trace ascites.      No evidence of acute abdominal or pelvic pathology.      DX-CHEST-PORTABLE (1 VIEW)   Final Result      Enlarged cardiac silhouette with interstitial prominence consistent with pulmonary edema with probable left pleural fluid.      CT-HEAD W/O    (Results Pending)        Assessment/Plan  Drug-seeking behavior- (present on admission)   Assessment & Plan    Patient was counseled.  Narcotics has been discontinued due to lethargy.     Metabolic encephalopathy- (present on admission)   Assessment  & Plan    Likely secondary to polypharmacy and narcotics on board in the light of end-stage renal disease.  Patient responding to Narcan.  Added Narcan 0.4 mg IV every hour as needed for altered mental status.  Patient still lethargic.  We will check CT scan of the brain.  There was twitching noted on the face.  Will check EEG.  Ammonia level was mildly elevated.  Start the patient on lactulose.  ABG did not show CO2 retaining.     ESRD (end stage renal disease) on dialysis (HCC)- (present on admission)   Assessment & Plan    Nephrology is consulted by ed physician for dialysis  Has missed her routine dialysis    continue daily hemodialysis until improvement in uremia    Clinically improving.    Hemodialysis per nephrology 3 times a week     Metabolic alkalosis- (present on admission)   Assessment & Plan    Likely secondary to volume contraction from dialysis.       Avascular necrosis (HCC)- (present on admission)   Assessment & Plan    Report bilateral hips.  Chronic.  Follow with orthopedics as outpatient  PT OT evaluation     Plan of care discussed with multidisciplinary team during rounds.    VTE prophylaxis: Heparin

## 2018-12-13 NOTE — PROGRESS NOTES
Patient continues to be lethargic, one word responses at most to questions. Patient has spontaneous eye opening to verbal cues, but is incapable of following commands. Will continue to hold PO medications.

## 2018-12-13 NOTE — PROCEDURES
VIDEO ELECTROENCEPHALOGRAM REPORT      Referring provider: Dr. Yu.     DOS: 12/13/2018 - 12/14/2018 (total recording of 19 hours and 24 minutes).     INDICATION:  Debby Carrizales 38 y.o. female presenting with altered mental status. Found in status epilepticus.    CURRENT ANTIEPILEPTIC REGIMEN: Levetiracetam 1000 mg daily added during the study.     TECHNIQUE: 30 channel 24 hrs video electroencephalogram (EEG) was performed in accordance with the international 10-20 system. The study was reviewed in bipolar and referential montages. The recording examined the patient during wakeful and drowsy/sleep state(s).     DESCRIPTION OF THE RECORD:  During the wakefulness, the background showed a symmetrical 7 Hz theta activity posteriorly with amplitude of 70 mV.  There was reactivity to eye closure/opening.  A normal anterior-posterior gradient was noted with faster beta frequencies seen anteriorly.  During drowsiness, theta/delta frequencies were seen.    During the sleep state, background shows diffuse high-amplitude 4-5 Hz delta activity.     ACTIVATION PROCEDURES:   Not performed.     ICTAL AND/OR INTERICTAL FINDINGS:   Initially, patient with generalized, 2-3 HZ rhythmic sharps and slow waves, consistent with electrographic status epilepticus. With the addition of Levetiracetam, there has been improvement, however still breakthrough electrographic seizures and intermittent runs of electrographic status epilepticus throughout the study, as well as frequent generalized rhythmic or periodic sharps or rhythmic frontal theta activity.      EKG: sampling of the EKG recording demonstrated sinus rhythm.     EVENTS:  None.     INTERPRETATION:  This is an abnormal 24 hrs video EEG recording in the awake, drowsy, and sleep state(s). A mild encephalopathy is suggested. Initially, patient with generalized, 2-3 HZ rhythmic sharps and slow waves, consistent with electrographic status epilepticus. With the addition of  Levetiracetam, there has been improvement, however still breakthrough electrographic seizures and intermittent runs of electrographic status epilepticus throughout the study, as well as frequent generalized rhythmic or periodic sharps or rhythmic frontal theta activity. Clinical and radiological correlation is recommended.    Updates provided to Dr. Yu and Dr. Vila.     Aman Flores MD   Epilepsy and Neurodiagnostics.   Clinical  of Neurology [facility].   Diplomate in Neurology, Epilepsy, and Electrodiagnostic Medicine.   Office: [phone]  Fax: [phone]

## 2018-12-13 NOTE — PROGRESS NOTES
Patient arrived to unit at 12:35. VS stable patient responding appropriately to some questions but remains lethargic and falls asleep quickly.   · 2 RN skin check complete.   · Devices in place: SCD's, continuous pulse ox, cardiac monitoring, soft wrist restraints and EEG continuous monitoring  · Skin assessed under devices. Partial thickness sore to L buttock see flowsheets. Old scaring and stretch marks to abdomen  · The following interventions in place: pillows used for support and repositioning. Devices frequently monitored, Q 2 removal of restraints and ROM.

## 2018-12-13 NOTE — EEG PROGRESS NOTE
PRELIMINARY EEG READ:     Pt in electrographic status epilepticus.   RN notified. Sent message to attending physician to call me back asap.

## 2018-12-13 NOTE — PROGRESS NOTES
3hr HD started @ 1117 and ended @ 1449,tx extended due clotted set up.Net UF =1525ml,200ml NS bolus given X 1 for hypotensive episode.VSS post HD,RUAAVFG + B/T,cannulation sites covered with DD,CDI,report given to Skip Jimenez RN.patient lethargic the whole tx.

## 2018-12-13 NOTE — CONSULTS
CC:  Status epilepticus    Date of Admission: 12/4/2018    Today's Date: 12/13/18    Consulting Physician: Rafael Yu M.D.      HPI:    Debby Carrizales is a 38 y.o. female back to normal now, noted EEG status epilepicus with severe confusion.  No associated symptoms.  No exacerbating or alleviating factors.  No other complaints.       ROS:   PERTINENT POSITIVES IN HPI  All other systems were reviewed and were negative.       Past Medical History:   Past Medical History:   Diagnosis Date   • Pyelonephritis 11/21/2017   • Avascular necrosis of bones of both hips (HCC) 10/10/2016   • ESBL (extended spectrum beta-lactamase) producing bacteria infection 8/25/2014   • Pneumonia    • Clostridium difficile colitis 5/3/2011   • Arthritis     all joints,r/t lupus   • Dialysis patient    • Fibromyalgia    • Hypertension    • Lupus    • Psychiatric disorder     anxiety, depression   • Renal failure        Past Surgical History:   Past Surgical History:   Procedure Laterality Date   • AV FISTULA REVISION Right 8/11/2018    Procedure: AV FISTULA REVISION- Graft and Thrombectomy;  Surgeon: Shabbir Ardon M.D.;  Location: [facility];  Service: General   • AV FISTULA THROMBOLYSIS Right 8/11/2018    Procedure: AV FISTULA THROMBOLYSIS;  Surgeon: Shabbir Ardon M.D.;  Location: [facility];  Service: General   • AV FISTULA CREATION Right 12/9/2017    Procedure: AV FISTULA CREATION-ARM FOR GRAFT;  Surgeon: Lesly Jansen M.D.;  Location: [facility];  Service: General   • THROMBECTOMY  12/9/2017    Procedure: THROMBECTOMY;  Surgeon: Lesly Jansen M.D.;  Location: [facility];  Service: General   • THROMBECTOMY Right 8/21/2016    Procedure: THROMBECTOMY - right AV fistula graft with grams;  Surgeon: Shabbir Ardon M.D.;  Location: [facility];  Service:    • ANGIOPLASTY BALLOON  8/21/2016    Procedure: ANGIOPLASTY BALLOON;  Surgeon:  Shabbir Ardon M.D.;  Location: [facility];  Service:    • THROMBECTOMY Right 8/20/2016    Procedure: THROMBECTOMY AV GRAFT;  Surgeon: Shabbir Ardon M.D.;  Location: SUR[facility]ERY [facility];  Service:    • AV FISTULA CREATION Right 7/12/2016    Procedure: AV FISTULA CREATION WITH GRAFT BRACHIAL AXILLARY;  Surgeon: Shabbir Ardon M.D.;  Location: SURGERY [facility];  Service:    • CLOSED REDUCTION Right 7/5/2016    Procedure: CLOSED REDUCTION- Hip ;  Surgeon: Michael Holman M.D.;  Location: SURGERY [facility];  Service:    • HIP ARTHROPLASTY TOTAL Right 1/18/2016    Procedure: HIP ARTHROPLASTY TOTAL;  Surgeon: Michael Holman M.D.;  Location: [facility];  Service:    • AV FISTULA CREATION  2/3/2015    Performed by Shabbir Ardon M.D. at [facility]   • AV FISTULA CREATION  11/14/2014    Performed by Shabbir Ardon M.D. at [facility]   • AV FISTULA CREATION  9/9/2014    Performed by Shabbir Ardon M.D. at [facility]   • CATH PLACEMENT  9/9/2014    Performed by Shabbir Ardon M.D. at [facility]   • OTHER  5/2011    tracheostomy   • GASTROSCOPY-ENDO  4/27/2011    Performed by PALMIRA DOAN at ENDOSCOPY [facility]   • COLONOSCOPY WITH BIOPSY  4/20/2011    Performed by ALCIRA CRUZ at [facility]   • GASTROSCOPY-ENDO  4/18/2011    Performed by ALCIRA CRUZ at [facility]   • GASTROSCOPY-ENDO  4/10/2011    Performed by SYED JURADO at ENDOSCOPY [facility]   • SCLEROTHERAPHY  4/10/2011    Performed by SYED JURADO at [facility]   • OTHER ABDOMINAL SURGERY      kidney biopsy   • TRACHEOSTOMY         Social History:   Social History     Social History   • Marital status: Single     Spouse name: N/A   • Number of children: N/A   • Years of education: N/A     Occupational History   • Not on file.     Social History Main Topics   •  Smoking status: Former Smoker     Packs/day: 0.50     Years: 18.00     Types: Cigarettes     Quit date: 6/13/2011   • Smokeless tobacco: Never Used      Comment: 1/2 ppd   • Alcohol use No   • Drug use: No   • Sexual activity: Not on file     Other Topics Concern   •  Service No   • Blood Transfusions Yes   • Caffeine Concern No   • Occupational Exposure No   • Hobby Hazards No   • Sleep Concern Yes   • Stress Concern Yes   • Weight Concern Yes   • Special Diet Yes   • Back Care No   • Exercise Yes   • Bike Helmet No   • Seat Belt Yes   • Self-Exams Yes     Social History Narrative   • No narrative on file       Family History:   Family History   Problem Relation Age of Onset   • Heart Disease Mother    • Cancer Father        HISTORIES AS ABOVE ARE INCOMPLETE IF PATIENT IS INTUBATED, OR UNABLE TO COMMUNICATE OR ELUCIDATE.    Allergies:   Allergies   Allergen Reactions   • Lorazepam [Ativan] Anxiety     Patient becomes severely paranoid and agitated   • Morphine Itching     Tolerates Dilaudid   • Seasonal Runny Nose and Itching     Hay fever, sabiha brush         Current Facility-Administered Medications:   •  levETIRAcetam (KEPPRA) 1,000 mg in  mL IVPB, 1,000 mg, Intravenous, Nightly, Rafael Yu M.D.  •  lactulose 20 GM/30ML solution 30 mL, 30 mL, Oral, BID, Rafael Yu M.D., Stopped at 12/12/18 1800  •  naloxone (NARCAN) injection 0.4 mg, 0.4 mg, Intravenous, Q HOUR PRN, Rafael Yu M.D.  •  lidocaine (LIDODERM) 5 % 1 Patch, 1 Patch, Transdermal, Q24HR, Renato Ho M.D., 1 Patch at 12/12/18 1607  •  sucralfate (CARAFATE) tablet 1 g, 1 g, Oral, Q6HRS, Renato Ho M.D., Stopped at 12/12/18 0000  •  amLODIPine (NORVASC) tablet 5 mg, 5 mg, Oral, Q DAY, Renato Ho M.D., Stopped at 12/12/18 0600  •  ascorbic acid tablet 500 mg, 500 mg, Oral, DAILY, Natan Stone M.D., Stopped at 12/12/18 0600  •  buPROPion SR (WELLBUTRIN-SR) tablet 150 mg, 150 mg, Oral, QAM,  Natan Stone M.D., Stopped at 12/12/18 0600  •  vitamin D (cholecalciferol) tablet 1,000 Units, 1,000 Units, Oral, DAILY, Natan Stone M.D., Stopped at 12/12/18 0600  •  ferrous sulfate tablet 325 mg, 325 mg, Oral, DAILY, Natan Stone M.D., Stopped at 12/12/18 0600  •  hydroxychloroquine (PLAQUENIL) tablet 200 mg, 200 mg, Oral, QHS, Natan Stone M.D., Stopped at 12/11/18 2100  •  omeprazole (PRILOSEC) capsule 20 mg, 20 mg, Oral, DAILY, Natan Stone M.D., Stopped at 12/12/18 0600  •  sevelamer carbonate (RENVELA) tablet 2,400 mg, 2,400 mg, Oral, TID WITH MEALS, Natan Stone M.D., Stopped at 12/12/18 0730  •  traZODone (DESYREL) tablet 50 mg, 50 mg, Oral, QHS, Natan Stone M.D., Stopped at 12/11/18 2100  •  senna-docusate (PERICOLACE or SENOKOT S) 8.6-50 MG per tablet 2 Tab, 2 Tab, Oral, BID, Stopped at 12/12/18 0600 **AND** polyethylene glycol/lytes (MIRALAX) PACKET 1 Packet, 1 Packet, Oral, QDAY PRN **AND** magnesium hydroxide (MILK OF MAGNESIA) suspension 30 mL, 30 mL, Oral, QDAY PRN **AND** bisacodyl (DULCOLAX) suppository 10 mg, 10 mg, Rectal, QDAY PRN, Natan Stone M.D.  •  ondansetron (ZOFRAN) syringe/vial injection 4 mg, 4 mg, Intravenous, Q4HRS PRN, Natan Stone M.D., 4 mg at 12/10/18 1141  •  ondansetron (ZOFRAN ODT) dispertab 4 mg, 4 mg, Oral, Q4HRS PRN, Natan Stone M.D.  •  promethazine (PHENERGAN) tablet 12.5-25 mg, 12.5-25 mg, Oral, Q4HRS PRN, Natan Stone M.D., 25 mg at 12/10/18 2147  •  promethazine (PHENERGAN) suppository 12.5-25 mg, 12.5-25 mg, Rectal, Q4HRS PRN, Natan Stone M.D.  •  simethicone (MYLICON) chewable tab 80 mg, 80 mg, Oral, TID PRN, Renato Ho M.D.  •  prochlorperazine (COMPAZINE) injection 10 mg, 10 mg, Intravenous, Q6HRS PRN, Renato Ho M.D., 10 mg at 12/05/18 0931  •  heparin pf injection 500 Units, 500 Units, Intracatheter, PRN, Renato Ho M.D., 500  Units at 12/05/18 1835  •  acetaminophen (TYLENOL) tablet 650 mg, 650 mg, Oral, Q4HRS PRN, Renato Ho M.D., 650 mg at 12/05/18 1847      PHYSICAL EXAM    Vitals:    12/13/18 1100 12/13/18 1214 12/13/18 1245 12/13/18 1300   BP: 136/89 114/71     Pulse: 77 74 86 83   Resp: (!) 22  20 12   Temp: 36.5 °C (97.7 °F)  36.4 °C (97.5 °F)    TempSrc: Temporal  Temporal    SpO2:   98% 98%   Weight:    78.5 kg (173 lb 1 oz)   Height:           Head/Neck: NCAT. no meningismus neg kernig neg brudzinski. No obvious mass or heard bruit. No tender arteries or lost pulses. No rash of head or neck. NO JVD.    Skin: Warm, dry, intact. No rashes observed head/neck or body    Eyes/Funduscopic:  unable    Mental Status: Awake, alert, oriented x 3. Name/repeat/fluent/command follow intact. No neglect/extinction. Attention and concentration, recent & remote memory, Fund of Knowledge wnl.     Cranial Nerves:  CN II-XII intact. PERRL unable refuses to allow me. No nystagmus.       Motor:  Strength  intact, no abn mvmts.  bulk & tone wnl.      Sensory: symmetric to sharp     Coordination: dysmetria absent    DTR's: intact/sym. no clonus. Toes mute.    Gait/Station: n/a fall concern           Labs:  Recent Labs      12/11/18   1033  12/13/18   0112   WBC  5.0  6.7   RBC  3.70*  3.87*   HEMOGLOBIN  11.2*  11.7*   HEMATOCRIT  34.5*  35.0*   MCV  93.2  90.4   MCH  30.3  30.2   MCHC  32.5*  33.4*   RDW  45.4  43.8   PLATELETCT  157*  158*   MPV  11.6  11.3     Recent Labs      12/11/18   1033  12/12/18   0803  12/13/18   0112   SODIUM  139  138  138   POTASSIUM  4.5  5.6*  4.4   CHLORIDE  96  98  98   CO2  31  20  26   GLUCOSE  88  80  93   BUN  43*  52*  34*   CREATININE  8.15*  9.76*  6.85*   CALCIUM  9.5  9.9  9.9                     Recent Labs      12/11/18   1033  12/12/18   0803  12/13/18   0112   SODIUM  139  138  138   POTASSIUM  4.5  5.6*  4.4   CHLORIDE  96  98  98   CO2  31  20  26   GLUCOSE  88  80  93   BUN  43*  52*  34*      Recent Labs      12/11/18   1033  12/12/18   0803  12/13/18   0112   SODIUM  139  138  138   POTASSIUM  4.5  5.6*  4.4   CHLORIDE  96  98  98   CO2  31  20  26   BUN  43*  52*  34*   CREATININE  8.15*  9.76*  6.85*   MAGNESIUM  2.6*   --    --    PHOSPHORUS  6.8*   --    --    CALCIUM  9.5  9.9  9.9         Results for orders placed or performed during the hospital encounter of 07/19/17   ECHOCARDIOGRAM COMP W/O CONT   Result Value Ref Range    Eject.Frac. MOD BP 58.38     Eject.Frac. MOD 4C 67.14     Eject.Frac. MOD 2C 67.81     Left Ventrical Ejection Fraction 60               Imaging: neuroimaging reviewed and directly visualized by me  CT-HEAD W/O   Final Result      Mild cerebral cortical atrophy. No mass effect or acute hemorrhage.      CT-ABDOMEN-PELVIS W/O   Final Result         Interstitial prominence in lung bases with pleural effusions consistent with pulmonary edema.      Anasarca and trace ascites.      No evidence of acute abdominal or pelvic pathology.      DX-CHEST-PORTABLE (1 VIEW)   Final Result      Enlarged cardiac silhouette with interstitial prominence consistent with pulmonary edema with probable left pleural fluid.      MR-BRAIN-W/O    (Results Pending)   EEG- VIEWED AT BEDSIDE, NO SE  CT REPORT REVIEWED    Assessment/Plan:    NCSE ON EEG, RESOLVED CLINICALLY/EEG    CONTINUE LEV AT RENAL DOSE  IF CEEG NEG OK TO DC  MRB W/O CONTRAST  Discuss seizure hygiene, triggers, and AED compliance/side effects/teratogenicity  Report to DMV for driving restriction; advised no activities where LOC a danger until neuro MD clears as otpt    Neurology will follow as needed, please call us if there is any question, signing off for now.    Consulting Physician: Rafael Yu M.D.     Rakan Vila M.D.  , Neurohospitalist & Stroke  Clinical Professor, [employer]  Diplomate, Neurology & Neuroimaging

## 2018-12-13 NOTE — THERAPY
"Physical Therapy Treatment completed.   Bed Mobility:  Supine to Sit: Maximal Assist  Transfers: Sit to Stand: Unable to Participate  Gait: Level Of Assist: Unable to Participate with No Equipment Needed       Plan of Care: Will benefit from Physical Therapy 3 times per week  Discharge Recommendations: Equipment: Will Continue to Assess for Equipment Needs. Post-acute therapy Discharge to a transitional care facility for continued skilled therapy services prior to DC home.     See \"Rehab Therapy-Acute\" Patient Summary Report for complete documentation.       "

## 2018-12-13 NOTE — CARE PLAN
Problem: Safety - Medical Restraint  Goal: Remains free of injury from restraints (Restraint for Interference with Medical Device)  INTERVENTIONS:  1. Determine that other, less restrictive measures have been tried or would not be effective before applying the restraint  2. Evaluate the patient's condition at the time of restraint application  3. Inform patient/family regarding the reason for restraint  4. Q2H: Monitor safety, psychosocial status, comfort, nutrition and hydration     Outcome: PROGRESSING AS EXPECTED      Problem: Skin Integrity  Goal: Risk for impaired skin integrity will decrease  Outcome: PROGRESSING AS EXPECTED  2 RN skin check completed, interventions in place to prevent breakdown

## 2018-12-13 NOTE — PROGRESS NOTES
Patient returned from Dialysis with no improvement in mental status. VSS. RA. Patient still very lethargic and unable to follow commands or answer any orientation questions. Pt opens eyes to verbal/painful stimuli but quickly falls back to sleep. MD paged and at bedside. New orders for an EEG, head CT and to give Narcan stat, which the patient did respond to with increased movement and dry heaving; however was still unable to follow commands or answer any questions.

## 2018-12-14 ENCOUNTER — APPOINTMENT (OUTPATIENT)
Dept: RADIOLOGY | Facility: MEDICAL CENTER | Age: 38
DRG: 091 | End: 2018-12-14
Attending: FAMILY MEDICINE
Payer: MEDICARE

## 2018-12-14 PROBLEM — E87.3 METABOLIC ALKALOSIS: Status: RESOLVED | Noted: 2018-12-12 | Resolved: 2018-12-14

## 2018-12-14 PROBLEM — G93.41 METABOLIC ENCEPHALOPATHY: Status: RESOLVED | Noted: 2017-07-19 | Resolved: 2018-12-14

## 2018-12-14 LAB
ANION GAP SERPL CALC-SCNC: 17 MMOL/L (ref 0–11.9)
BASOPHILS # BLD AUTO: 0.8 % (ref 0–1.8)
BASOPHILS # BLD: 0.07 K/UL (ref 0–0.12)
BUN SERPL-MCNC: 55 MG/DL (ref 8–22)
CALCIUM SERPL-MCNC: 9.5 MG/DL (ref 8.5–10.5)
CHLORIDE SERPL-SCNC: 99 MMOL/L (ref 96–112)
CO2 SERPL-SCNC: 23 MMOL/L (ref 20–33)
CREAT SERPL-MCNC: 9.97 MG/DL (ref 0.5–1.4)
EOSINOPHIL # BLD AUTO: 0.12 K/UL (ref 0–0.51)
EOSINOPHIL NFR BLD: 1.4 % (ref 0–6.9)
ERYTHROCYTE [DISTWIDTH] IN BLOOD BY AUTOMATED COUNT: 45.7 FL (ref 35.9–50)
GLUCOSE SERPL-MCNC: 89 MG/DL (ref 65–99)
HCT VFR BLD AUTO: 32.1 % (ref 37–47)
HGB BLD-MCNC: 10.2 G/DL (ref 12–16)
IMM GRANULOCYTES # BLD AUTO: 0.04 K/UL (ref 0–0.11)
IMM GRANULOCYTES NFR BLD AUTO: 0.5 % (ref 0–0.9)
LYMPHOCYTES # BLD AUTO: 1.7 K/UL (ref 1–4.8)
LYMPHOCYTES NFR BLD: 20.3 % (ref 22–41)
MCH RBC QN AUTO: 29.3 PG (ref 27–33)
MCHC RBC AUTO-ENTMCNC: 31.8 G/DL (ref 33.6–35)
MCV RBC AUTO: 92.2 FL (ref 81.4–97.8)
MONOCYTES # BLD AUTO: 1.06 K/UL (ref 0–0.85)
MONOCYTES NFR BLD AUTO: 12.6 % (ref 0–13.4)
NEUTROPHILS # BLD AUTO: 5.4 K/UL (ref 2–7.15)
NEUTROPHILS NFR BLD: 64.4 % (ref 44–72)
NRBC # BLD AUTO: 0 K/UL
NRBC BLD-RTO: 0 /100 WBC
PLATELET # BLD AUTO: 137 K/UL (ref 164–446)
PMV BLD AUTO: 11.4 FL (ref 9–12.9)
POTASSIUM SERPL-SCNC: 4.5 MMOL/L (ref 3.6–5.5)
RBC # BLD AUTO: 3.48 M/UL (ref 4.2–5.4)
SODIUM SERPL-SCNC: 139 MMOL/L (ref 135–145)
WBC # BLD AUTO: 8.4 K/UL (ref 4.8–10.8)

## 2018-12-14 PROCEDURE — 92610 EVALUATE SWALLOWING FUNCTION: CPT

## 2018-12-14 PROCEDURE — 700102 HCHG RX REV CODE 250 W/ 637 OVERRIDE(OP): Performed by: INTERNAL MEDICINE

## 2018-12-14 PROCEDURE — 700105 HCHG RX REV CODE 258: Performed by: PSYCHIATRY & NEUROLOGY

## 2018-12-14 PROCEDURE — 90935 HEMODIALYSIS ONE EVALUATION: CPT

## 2018-12-14 PROCEDURE — 95951 EEG-24 HR: CPT | Mod: 26 | Performed by: PSYCHIATRY & NEUROLOGY

## 2018-12-14 PROCEDURE — 4A10X4Z MONITORING OF CENTRAL NERVOUS ELECTRICAL ACTIVITY, EXTERNAL APPROACH: ICD-10-PCS | Performed by: PSYCHIATRY & NEUROLOGY

## 2018-12-14 PROCEDURE — 3E0G76Z INTRODUCTION OF NUTRITIONAL SUBSTANCE INTO UPPER GI, VIA NATURAL OR ARTIFICIAL OPENING: ICD-10-PCS | Performed by: STUDENT IN AN ORGANIZED HEALTH CARE EDUCATION/TRAINING PROGRAM

## 2018-12-14 PROCEDURE — G8997 SWALLOW GOAL STATUS: HCPCS | Mod: CJ

## 2018-12-14 PROCEDURE — G8996 SWALLOW CURRENT STATUS: HCPCS | Mod: CL

## 2018-12-14 PROCEDURE — 700111 HCHG RX REV CODE 636 W/ 250 OVERRIDE (IP): Performed by: FAMILY MEDICINE

## 2018-12-14 PROCEDURE — 700102 HCHG RX REV CODE 250 W/ 637 OVERRIDE(OP): Performed by: FAMILY MEDICINE

## 2018-12-14 PROCEDURE — A9270 NON-COVERED ITEM OR SERVICE: HCPCS | Performed by: FAMILY MEDICINE

## 2018-12-14 PROCEDURE — 700101 HCHG RX REV CODE 250: Performed by: INTERNAL MEDICINE

## 2018-12-14 PROCEDURE — 306565 RIGID MIT RESTRAINT(PAIR): Performed by: INTERNAL MEDICINE

## 2018-12-14 PROCEDURE — 306560 TORSO SUPPORT,POSEY RESTR LG: Performed by: INTERNAL MEDICINE

## 2018-12-14 PROCEDURE — 99231 SBSQ HOSP IP/OBS SF/LOW 25: CPT | Mod: 25 | Performed by: PSYCHIATRY & NEUROLOGY

## 2018-12-14 PROCEDURE — 700105 HCHG RX REV CODE 258: Performed by: FAMILY MEDICINE

## 2018-12-14 PROCEDURE — C9254 INJECTION, LACOSAMIDE: HCPCS | Performed by: PSYCHIATRY & NEUROLOGY

## 2018-12-14 PROCEDURE — 99233 SBSQ HOSP IP/OBS HIGH 50: CPT | Performed by: HOSPITALIST

## 2018-12-14 PROCEDURE — 700111 HCHG RX REV CODE 636 W/ 250 OVERRIDE (IP): Performed by: HOSPITALIST

## 2018-12-14 PROCEDURE — 99232 SBSQ HOSP IP/OBS MODERATE 35: CPT | Performed by: INTERNAL MEDICINE

## 2018-12-14 PROCEDURE — 80048 BASIC METABOLIC PNL TOTAL CA: CPT

## 2018-12-14 PROCEDURE — 85025 COMPLETE CBC W/AUTO DIFF WBC: CPT

## 2018-12-14 PROCEDURE — A9270 NON-COVERED ITEM OR SERVICE: HCPCS | Performed by: INTERNAL MEDICINE

## 2018-12-14 PROCEDURE — 770022 HCHG ROOM/CARE - ICU (200)

## 2018-12-14 PROCEDURE — 95951 EEG-24 HR: CPT | Performed by: PSYCHIATRY & NEUROLOGY

## 2018-12-14 PROCEDURE — 700111 HCHG RX REV CODE 636 W/ 250 OVERRIDE (IP): Performed by: PSYCHIATRY & NEUROLOGY

## 2018-12-14 RX ORDER — LACTULOSE 20 G/30ML
30 SOLUTION ORAL 2 TIMES DAILY
Status: DISCONTINUED | OUTPATIENT
Start: 2018-12-15 | End: 2018-12-15

## 2018-12-14 RX ORDER — SIMETHICONE 80 MG
80 TABLET,CHEWABLE ORAL 3 TIMES DAILY PRN
Status: DISCONTINUED | OUTPATIENT
Start: 2018-12-14 | End: 2018-12-15

## 2018-12-14 RX ORDER — ACETAMINOPHEN 325 MG/1
650 TABLET ORAL EVERY 4 HOURS PRN
Status: DISCONTINUED | OUTPATIENT
Start: 2018-12-14 | End: 2018-12-15

## 2018-12-14 RX ORDER — HEPARIN SODIUM 5000 [USP'U]/ML
5000 INJECTION, SOLUTION INTRAVENOUS; SUBCUTANEOUS EVERY 8 HOURS
Status: DISCONTINUED | OUTPATIENT
Start: 2018-12-14 | End: 2018-12-17 | Stop reason: HOSPADM

## 2018-12-14 RX ORDER — BISACODYL 10 MG
10 SUPPOSITORY, RECTAL RECTAL
Status: DISCONTINUED | OUTPATIENT
Start: 2018-12-14 | End: 2018-12-15

## 2018-12-14 RX ORDER — PROMETHAZINE HYDROCHLORIDE 25 MG/1
12.5-25 TABLET ORAL EVERY 4 HOURS PRN
Status: DISCONTINUED | OUTPATIENT
Start: 2018-12-14 | End: 2018-12-15

## 2018-12-14 RX ORDER — AMLODIPINE BESYLATE 10 MG/1
5 TABLET ORAL
Status: DISCONTINUED | OUTPATIENT
Start: 2018-12-15 | End: 2018-12-15

## 2018-12-14 RX ORDER — AMOXICILLIN 250 MG
2 CAPSULE ORAL 2 TIMES DAILY
Status: DISCONTINUED | OUTPATIENT
Start: 2018-12-15 | End: 2018-12-15

## 2018-12-14 RX ORDER — POLYETHYLENE GLYCOL 3350 17 G/17G
1 POWDER, FOR SOLUTION ORAL
Status: DISCONTINUED | OUTPATIENT
Start: 2018-12-14 | End: 2018-12-15

## 2018-12-14 RX ORDER — HYDROXYCHLOROQUINE SULFATE 200 MG/1
200 TABLET, FILM COATED ORAL
Status: DISCONTINUED | OUTPATIENT
Start: 2018-12-14 | End: 2018-12-15

## 2018-12-14 RX ORDER — ASCORBIC ACID 500 MG
500 TABLET ORAL DAILY
Status: DISCONTINUED | OUTPATIENT
Start: 2018-12-15 | End: 2018-12-15

## 2018-12-14 RX ADMIN — HEPARIN SODIUM 5000 UNITS: 5000 INJECTION, SOLUTION INTRAVENOUS; SUBCUTANEOUS at 22:28

## 2018-12-14 RX ADMIN — SODIUM CHLORIDE 50 MG: 9 INJECTION, SOLUTION INTRAVENOUS at 17:12

## 2018-12-14 RX ADMIN — SIMETHICONE CHEW TAB 80 MG 80 MG: 80 TABLET ORAL at 23:37

## 2018-12-14 RX ADMIN — SODIUM CHLORIDE 1000 MG: 9 INJECTION, SOLUTION INTRAVENOUS at 22:29

## 2018-12-14 RX ADMIN — SODIUM CHLORIDE 50 MG: 9 INJECTION, SOLUTION INTRAVENOUS at 10:50

## 2018-12-14 RX ADMIN — HEPARIN SODIUM 5000 UNITS: 5000 INJECTION, SOLUTION INTRAVENOUS; SUBCUTANEOUS at 13:10

## 2018-12-14 RX ADMIN — SODIUM CHLORIDE 100 MG: 9 INJECTION, SOLUTION INTRAVENOUS at 09:23

## 2018-12-14 RX ADMIN — LIDOCAINE 1 PATCH: 50 PATCH TOPICAL at 17:12

## 2018-12-14 RX ADMIN — HYDROXYCHLOROQUINE SULFATE 200 MG: 200 TABLET, FILM COATED ORAL at 23:38

## 2018-12-14 RX ADMIN — MIDAZOLAM HYDROCHLORIDE 4 MG: 1 INJECTION, SOLUTION INTRAMUSCULAR; INTRAVENOUS at 16:00

## 2018-12-14 RX ADMIN — TRAZODONE HYDROCHLORIDE 50 MG: 50 TABLET ORAL at 23:37

## 2018-12-14 ASSESSMENT — PAIN SCALES - GENERAL
PAINLEVEL_OUTOF10: 0
PAINLEVEL_OUTOF10: 3

## 2018-12-14 ASSESSMENT — ENCOUNTER SYMPTOMS: WEAKNESS: 1

## 2018-12-14 NOTE — THERAPY
"  Speech Language Therapy Clinical Swallow Evaluation completed.  Functional Status: Pt seen this date for clinical swallow evaluation. Pt lethargic, not following directions, and had limited whispered verbal output. Pt finishing dialysis at start of evaluation, with continuous EEG in progress. Oral mechanism exam attempted but not completed 2/2 to pt's limited direction following and lethargy. Lingual, labial and jaw structures appreciated to be weak with limited ROM. Volitional throat clear appreciated to be weak, pt unable to follow directions to produce volitional cough. PO trials of ice, NTL by tsp/cup sip, puree, and thins by cup completed. Hyolarygneal elevation palpated as complete but weak.Timely to delayed swallow initiation, 4-5 swallows per PO trial, and throat clearing appreciated with all consistencies consumed. Given pt's lethargy, multiple swallows per bite/sip, and weak throat clear, recommend NPO with Cortrak.     Recommendations - Diet: Cortrak-Diet / Liquid Recommendation: (P) NPO                          Strategies:To be assessed                          Medication Administration: Medication Administration : (P) Via Gastric Tube  Plan of Care: Will benefit from Speech Therapy 5 times per week  Post-Acute Therapy: Recommend inpatient transitional care services for continued speech therapy services.      See \"Rehab Therapy-Acute\" Patient Summary Report for complete documentation.   "

## 2018-12-14 NOTE — CARE PLAN
Problem: Safety - Medical Restraint  Goal: Remains free of injury from restraints (Restraint for Interference with Medical Device)  INTERVENTIONS:  1. Determine that other, less restrictive measures have been tried or would not be effective before applying the restraint  2. Evaluate the patient's condition at the time of restraint application  3. Inform patient/family regarding the reason for restraint  4. Q2H: Monitor safety, psychosocial status, comfort, nutrition and hydration     Outcome: PROGRESSING AS EXPECTED      Problem: Skin Integrity  Goal: Risk for impaired skin integrity will decrease  Outcome: PROGRESSING AS EXPECTED  Waffle cushion in place. Devices monitored. Pillows for support and repositioning

## 2018-12-14 NOTE — PROGRESS NOTES
Nephrology Daily Progress Note    Date of Service  12/14/2018    Chief Complaint  38 y.o. female admitted 12/4/2018 with ESRD, anemia    Interval Problem Update  Found to have seizures  Appears improved    Review of Systems  Review of Systems   Constitutional: Positive for malaise/fatigue.   Neurological: Positive for weakness.   All other systems reviewed and are negative.       Physical Exam  Temp:  [36.3 °C (97.3 °F)-36.7 °C (98 °F)] 36.7 °C (98 °F)  Pulse:  [61-86] 73  Resp:  [12-23] 22  BP: (114)/(71) 114/71    Physical Exam   Constitutional: She appears well-developed.   HENT:   Head: Normocephalic and atraumatic.   Cardiovascular: Normal rate and regular rhythm.    Pulmonary/Chest: Effort normal and breath sounds normal.   Skin: Skin is warm and dry. She is not diaphoretic.       Fluids    Intake/Output Summary (Last 24 hours) at 12/14/18 1120  Last data filed at 12/14/18 1000   Gross per 24 hour   Intake           307.83 ml   Output                0 ml   Net           307.83 ml       Laboratory  Recent Labs      12/13/18   0112  12/14/18   0500   WBC  6.7  8.4   RBC  3.87*  3.48*   HEMOGLOBIN  11.7*  10.2*   HEMATOCRIT  35.0*  32.1*   MCV  90.4  92.2   MCH  30.2  29.3   MCHC  33.4*  31.8*   RDW  43.8  45.7   PLATELETCT  158*  137*   MPV  11.3  11.4     Recent Labs      12/12/18   0803  12/13/18   0112  12/14/18   0500   SODIUM  138  138  139   POTASSIUM  5.6*  4.4  4.5   CHLORIDE  98  98  99   CO2  20  26  23   GLUCOSE  80  93  89   BUN  52*  34*  55*   CREATININE  9.76*  6.85*  9.97*   CALCIUM  9.9  9.9  9.5                   Imaging  CT-HEAD W/O   Final Result      Mild cerebral cortical atrophy. No mass effect or acute hemorrhage.      CT-ABDOMEN-PELVIS W/O   Final Result         Interstitial prominence in lung bases with pleural effusions consistent with pulmonary edema.      Anasarca and trace ascites.      No evidence of acute abdominal or pelvic pathology.      DX-CHEST-PORTABLE (1 VIEW)   Final  Result      Enlarged cardiac silhouette with interstitial prominence consistent with pulmonary edema with probable left pleural fluid.      MR-BRAIN-W/O    (Results Pending)        Assessment/Plan    1. ESRD - HD TTS schedule, labs are stable  2. Volume overload -continue ultrafiltration as tolerated on dialysis  3.  Anemia of chronic kidney disease, no Epogen    -Next plan for dialysis tomorrow  -We will follow

## 2018-12-14 NOTE — CARE PLAN
Problem: Safety - Medical Restraint  Goal: Remains free of injury from restraints (Restraint for Interference with Medical Device)  INTERVENTIONS:  1. Determine that other, less restrictive measures have been tried or would not be effective before applying the restraint  2. Evaluate the patient's condition at the time of restraint application  3. Inform patient/family regarding the reason for restraint  4. Q2H: Monitor safety, psychosocial status, comfort, nutrition and hydration     Outcome: PROGRESSING AS EXPECTED

## 2018-12-14 NOTE — PROGRESS NOTES
[facility] Medicine Daily Progress Note    Date of Service  12/13/2018    Chief Complaint  38 y.o. female history of end-stage renal disease on dialysis, C. difficile colitis, ESBL, fibromyalgia, hypertension, lupus, pyelonephritis admitted 12/4/2018 with complaints of abdominal pain, constipation, missed hemodialysis, uremia, hyperkalemia    Interval Problem Update    Patient was still having twitches on her face noted again this morning.  Was having EEG started during my interview.  She is very lethargic and opens her eyes to physical stimulus but drift quickly back to sleep.  She still able to protect her airways with normal saturation on room air.    Consultants/Specialty  Nephrology  Neurology  Code Status  Full code    Disposition  Likely home    Review of Systems  Review of Systems   Unable to perform ROS: Mental acuity   Gastrointestinal: Abdominal pain: Chronic. Constipation: Chronic.   No change in review of system    Physical Exam  Temp:  [36.3 °C (97.3 °F)-36.5 °C (97.7 °F)] 36.4 °C (97.6 °F)  Pulse:  [73-86] 74  Resp:  [12-23] 19  BP: (114-136)/(71-91) 114/71    Physical Exam   Constitutional: She is uncooperative. She appears ill.   HENT:   Head: Normocephalic and atraumatic.   Mouth/Throat: No oropharyngeal exudate.   Eyes: Pupils are equal, round, and reactive to light. Conjunctivae are normal. No scleral icterus.   Neck: Normal range of motion. No thyromegaly present.   Cardiovascular: Normal rate.  Exam reveals no gallop and no friction rub.    Pulmonary/Chest: Effort normal. No respiratory distress. She has no wheezes. She has no rales.   Abdominal: Soft. Bowel sounds are normal. She exhibits no distension. There is no tenderness. There is no rigidity.   Musculoskeletal: Normal range of motion. She exhibits no tenderness or deformity.   Neurological: She is unresponsive.   Skin: Skin is warm. She is not diaphoretic.   Psychiatric:   Unable to assess   Nursing note and vitals reviewed.  No  significant change in exam    Fluids    Intake/Output Summary (Last 24 hours) at 12/13/18 1829  Last data filed at 12/13/18 1800   Gross per 24 hour   Intake              100 ml   Output                0 ml   Net              100 ml       Laboratory  Recent Labs      12/11/18   1033  12/13/18   0112   WBC  5.0  6.7   RBC  3.70*  3.87*   HEMOGLOBIN  11.2*  11.7*   HEMATOCRIT  34.5*  35.0*   MCV  93.2  90.4   MCH  30.3  30.2   MCHC  32.5*  33.4*   RDW  45.4  43.8   PLATELETCT  157*  158*   MPV  11.6  11.3     Recent Labs      12/11/18   1033  12/12/18   0803  12/13/18   0112   SODIUM  139  138  138   POTASSIUM  4.5  5.6*  4.4   CHLORIDE  96  98  98   CO2  31  20  26   GLUCOSE  88  80  93   BUN  43*  52*  34*   CREATININE  8.15*  9.76*  6.85*   CALCIUM  9.5  9.9  9.9                   Imaging  CT-HEAD W/O   Final Result      Mild cerebral cortical atrophy. No mass effect or acute hemorrhage.      CT-ABDOMEN-PELVIS W/O   Final Result         Interstitial prominence in lung bases with pleural effusions consistent with pulmonary edema.      Anasarca and trace ascites.      No evidence of acute abdominal or pelvic pathology.      DX-CHEST-PORTABLE (1 VIEW)   Final Result      Enlarged cardiac silhouette with interstitial prominence consistent with pulmonary edema with probable left pleural fluid.      MR-BRAIN-W/O    (Results Pending)        Assessment/Plan  Drug-seeking behavior- (present on admission)   Assessment & Plan    Patient was counseled.  Narcotics has been discontinued due to lethargy.     Metabolic encephalopathy- (present on admission)   Assessment & Plan    Secondary to above.  Seizure precautions.  Aspiration precautions.     ESRD (end stage renal disease) on dialysis (HCC)- (present on admission)   Assessment & Plan    Nephrology is consulted by ed physician for dialysis  Has missed her routine dialysis    continue daily hemodialysis until improvement in uremia      Hemodialysis per nephrology 3 times a  week     Status epilepticus (HCC)- (present on admission)   Assessment & Plan    EEG showed status epilepticus.  I gave the patient a loading dose of Keppra of 1.5 g IV x1 then started Keppra 500 mg IV every 12 hours.  I gave the patient a 2 mg of Ativan stat and transferred the patient to the intensive care unit.  I ordered MRI of the brain without contrast.  I consulted neurology.  Continue with seizure, aspiration precautions.  24 hours EEG ordered.       Metabolic alkalosis- (present on admission)   Assessment & Plan    Likely secondary to volume contraction from dialysis.       Avascular necrosis (HCC)- (present on admission)   Assessment & Plan    Report bilateral hips.  Chronic.  Follow with orthopedics as outpatient  PT OT evaluation     Plan of care discussed with multidisciplinary team during rounds.    VTE prophylaxis: Heparin

## 2018-12-14 NOTE — PROGRESS NOTES
HD today for 3 hours ordered by Dr. Gotti. Treatment initiated at 1105 and terminated at 1405. Tolerated treatment well. Decrease in uf goal X2 for hypotension and tachycardia.     Net UF 1500ml    Sites held for 10 minutes. Dressing clean, dry and intact. Report given to Primary JOSETTE Baptiste.

## 2018-12-14 NOTE — DISCHARGE PLANNING
Due to patient still being admitted CM will need to set patient up with a new appointment to establish with a PCP. Please provide HH with the new establishing PCP, Date and a time. Once patient goes to said appointment HH will then schedule SOC.     Thank you

## 2018-12-14 NOTE — CARE PLAN
Problem: Skin Integrity  Goal: Risk for impaired skin integrity will decrease  Outcome: PROGRESSING AS EXPECTED  Turned patient every two hours, performed 2 RN skin check with Shayy RN  Intervention: Assess risk factors for impaired skin integrity and/or pressure ulcers  Pt is restrained and not making big shifts to position independently.

## 2018-12-14 NOTE — PROCEDURES
VIDEO ELECTROENCEPHALOGRAM REPORT        Referring provider: Dr. Yu.      DOS: 12/14/2018 - 12/15/2018 (total recording of 23 hours and 21 minutes).      INDICATION:  Debby Carrizales 38 y.o. female presenting with altered mental status. Found in status epilepticus.     CURRENT ANTIEPILEPTIC REGIMEN: Levetiracetam 1000 mg daily, Lacosamide 50 mg q 12 hrs was added during the study.      TECHNIQUE: 30 channel 24 hrs video electroencephalogram (EEG) was performed in accordance with the international 10-20 system. The study was reviewed in bipolar and referential montages. The recording examined the patient during wakeful and drowsy/sleep state(s).      DESCRIPTION OF THE RECORD:  During the wakefulness, the background showed a symmetrical 7 Hz theta activity posteriorly with amplitude of 70 mV.  There was reactivity to eye closure/opening.  A normal anterior-posterior gradient was noted with faster beta frequencies seen anteriorly.  During drowsiness, theta/delta frequencies were seen.     During the sleep state, background shows diffuse high-amplitude 4-5 Hz delta activity.      ACTIVATION PROCEDURES:   Not performed.      ICTAL AND/OR INTERICTAL FINDINGS:   Frequent runs of generalized, frontally predominant, 2-3 HZ spike and slow waves. Initially during this study, electrographic seizures were frequent, but there has been improvement after addition of Lacosamide, although frequent bursts of generalized epileptiform activity are still captured, but no clear further seizures in the last several hours of this study.     EKG: sampling of the EKG recording demonstrated sinus rhythm.      EVENTS:  None.      INTERPRETATION:  This is an abnormal 24 hrs video EEG recording in the awake, drowsy, and sleep state(s). A mild encephalopathy is suggested. Frequent runs of generalized, frontally predominant, 2-3 HZ spike and slow waves. Initially during this study, electrographic seizures were frequent, but there  has been improvement after addition of Lacosamide, although frequent bursts of generalized epileptiform activity are still captured, but no clear further seizures in the last several hours of this study. The patient remains at a significant increased risk for seizures. Clinical and radiological correlation is recommended.     Updates provided to Dr. Caridad Flores MD   Epilepsy and Neurodiagnostics.   Clinical  of Neurology [facility].   Diplomate in Neurology, Epilepsy, and Electrodiagnostic Medicine.   Office: [phone]  Fax: [phone]

## 2018-12-14 NOTE — PROGRESS NOTES
notified by EEG tech patient was removing EEG leads. RN to patient room discover pt picking and pulling off leads. Explain reason for continuing and need to continue restraints at this time due to patient intermittent disorientation. Patient explained understanding. Soft R and L wrist restraints reapplied.

## 2018-12-14 NOTE — PROGRESS NOTES
Hospital MD updated on patient transfer. 2 mg Versed PRN ordered for piror to MRI to decrease movement. Patient made NPO with no PM meds due to lethargy.

## 2018-12-14 NOTE — PROGRESS NOTES
· 2 RN skin check complete.   · Skin assessed under devices bp cuff, pulse oximeter, ecg leads.  · Old wounds noted on left buttock.  · The following interventions in place waffle cushion.

## 2018-12-14 NOTE — PROGRESS NOTES
[facility] Medicine Daily Progress Note    Date of Service  12/14/2018    Chief Complaint  38 y.o. female history of end-stage renal disease on dialysis, C. difficile colitis, ESBL, fibromyalgia, hypertension, lupus, pyelonephritis admitted 12/4/2018 with complaints of abdominal pain, constipation, missed hemodialysis, uremia, hyperkalemia    Interval Problem Update      Seizure activity overnight  Started on vimpat  AOx4  SR 60-80  Afebrile  Transient episode of hypoxia overnight correlating with seizure-like activity          Consultants/Specialty  Nephrology  Neurology  Code Status  Full code    Disposition  Likely home    Review of Systems  Review of Systems   Unable to perform ROS: Mental acuity   Gastrointestinal: Abdominal pain: Chronic. Constipation: Chronic.   No change in review of system    Physical Exam  Temp:  [36.3 °C (97.3 °F)-36.7 °C (98 °F)] 36.7 °C (98 °F)  Pulse:  [61-86] 67  Resp:  [12-23] 19  BP: (114-136)/(71-89) 114/71    Physical Exam   Constitutional: She appears well-developed and well-nourished.   HENT:   Head: Normocephalic and atraumatic.   Right Ear: External ear normal.   Left Ear: External ear normal.   Mouth/Throat: No oropharyngeal exudate.   EEG leads in place   Eyes: Pupils are equal, round, and reactive to light. Conjunctivae are normal. Right eye exhibits no discharge. Left eye exhibits no discharge. No scleral icterus.   Neck: Neck supple. No JVD present. No tracheal deviation present.   Cardiovascular: Normal rate and regular rhythm.  Exam reveals no gallop and no friction rub.    No murmur heard.  Pulmonary/Chest: Effort normal and breath sounds normal. No respiratory distress. She has no wheezes. She exhibits no tenderness.   Abdominal: Soft. Bowel sounds are normal. She exhibits no distension. There is no tenderness. There is no rebound.   Musculoskeletal: She exhibits no edema or tenderness.   Neurological: No cranial nerve deficit. She exhibits normal muscle tone.    Lethargic   follows command     Skin: Skin is warm and dry. She is not diaphoretic. No cyanosis. Nails show no clubbing.   Psychiatric: Her speech is delayed. She is slowed.   Nursing note and vitals reviewed.  No significant change in exam    Fluids    Intake/Output Summary (Last 24 hours) at 12/14/18 1013  Last data filed at 12/14/18 0800   Gross per 24 hour   Intake              240 ml   Output                0 ml   Net              240 ml       Laboratory  Recent Labs      12/11/18   1033  12/13/18   0112  12/14/18   0500   WBC  5.0  6.7  8.4   RBC  3.70*  3.87*  3.48*   HEMOGLOBIN  11.2*  11.7*  10.2*   HEMATOCRIT  34.5*  35.0*  32.1*   MCV  93.2  90.4  92.2   MCH  30.3  30.2  29.3   MCHC  32.5*  33.4*  31.8*   RDW  45.4  43.8  45.7   PLATELETCT  157*  158*  137*   MPV  11.6  11.3  11.4     Recent Labs      12/12/18   0803  12/13/18   0112  12/14/18   0500   SODIUM  138  138  139   POTASSIUM  5.6*  4.4  4.5   CHLORIDE  98  98  99   CO2  20  26  23   GLUCOSE  80  93  89   BUN  52*  34*  55*   CREATININE  9.76*  6.85*  9.97*   CALCIUM  9.9  9.9  9.5                   Imaging  CT-HEAD W/O   Final Result      Mild cerebral cortical atrophy. No mass effect or acute hemorrhage.      CT-ABDOMEN-PELVIS W/O   Final Result         Interstitial prominence in lung bases with pleural effusions consistent with pulmonary edema.      Anasarca and trace ascites.      No evidence of acute abdominal or pelvic pathology.      DX-CHEST-PORTABLE (1 VIEW)   Final Result      Enlarged cardiac silhouette with interstitial prominence consistent with pulmonary edema with probable left pleural fluid.      MR-BRAIN-W/O    (Results Pending)        Assessment/Plan  * Status epilepticus (HCC)- (present on admission)   Assessment & Plan    Continue Keppra  Order as needed lorazepam  Started on lacosamide  On continuous EEG  Neurology following discussed with Dr. Vila  Follow-up on MRI    Aspiration precautions  SLP eval and if fails place  core track and start tube feeding       Drug-seeking behavior- (present on admission)   Assessment & Plan    Avoid narcotics and sedating agents       ESRD (end stage renal disease) on dialysis ([facility])- (present on admission)   Assessment & Plan    Continue hemodialysis per nephrology  Electrolytes stable     Lupus (systemic lupus erythematosus) ([facility])- (present on admission)   Assessment & Plan    Continue Plaquenil     Avascular necrosis ([facility])- (present on admission)   Assessment & Plan    Report bilateral hips.  Chronic.  Follow with orthopedics as outpatient         Hypertension- (present on admission)   Assessment & Plan    Stable on amlodipine continue to monitor         VTE prophylaxis: Heparin

## 2018-12-14 NOTE — ASSESSMENT & PLAN NOTE
MRI with no acute pathology  Continue Vimpat and Keppra  She will need to follow-up with neurology as outpatient

## 2018-12-14 NOTE — PROGRESS NOTES
Date of Admission: 12/4/2018    reason for follow up: NCSE ON EEG    24hr INTERVAL HX:  Disoriented to date.  Mild periodic confusion.  No other complaints.     O-   Allergies:   Allergies   Allergen Reactions   • Lorazepam [Ativan] Anxiety     Patient becomes severely paranoid and agitated   • Morphine Itching     Tolerates Dilaudid   • Seasonal Runny Nose and Itching     Hay fever, sabiha brush         Current Facility-Administered Medications:   •  lacosamide (VIMPAT) 50 mg in  mL ivpb, 50 mg, Intravenous, Q12HRS, Rakan Vila M.D.  •  lacosamide (VIMPAT) 100 mg in  mL ivpb, 100 mg, Intravenous, Once, Rakan Vila M.D., Stopped at 12/14/18 1023  •  levETIRAcetam (KEPPRA) 1,000 mg in  mL IVPB, 1,000 mg, Intravenous, Q24HR, Rafael Yu M.D., Stopped at 12/13/18 2039  •  midazolam (VERSED) injection 2 mg, 2 mg, Intravenous, Q HOUR PRN, Mik Ron M.D.  •  lactulose 20 GM/30ML solution 30 mL, 30 mL, Oral, BID, Rafael Yu M.D., Stopped at 12/12/18 1800  •  naloxone (NARCAN) injection 0.4 mg, 0.4 mg, Intravenous, Q HOUR PRN, Rafael Yu M.D.  •  lidocaine (LIDODERM) 5 % 1 Patch, 1 Patch, Transdermal, Q24HR, Renato Ho M.D., 1 Patch at 12/13/18 1811  •  sucralfate (CARAFATE) tablet 1 g, 1 g, Oral, Q6HRS, Renato Ho M.D., Stopped at 12/12/18 0000  •  amLODIPine (NORVASC) tablet 5 mg, 5 mg, Oral, Q DAY, Renato Ho M.D., Stopped at 12/12/18 0600  •  ascorbic acid tablet 500 mg, 500 mg, Oral, DAILY, Natan Stone M.D., Stopped at 12/12/18 0600  •  vitamin D (cholecalciferol) tablet 1,000 Units, 1,000 Units, Oral, DAILY, Natan Stone M.D., Stopped at 12/12/18 0600  •  ferrous sulfate tablet 325 mg, 325 mg, Oral, DAILY, Natan Stone M.D., Stopped at 12/12/18 0600  •  hydroxychloroquine (PLAQUENIL) tablet 200 mg, 200 mg, Oral, QHS, Natan Stone M.D., Stopped at 12/11/18 2100  •  omeprazole (PRILOSEC) capsule 20 mg, 20  mg, Oral, DAILY, Natan Stone M.D., Stopped at 12/12/18 0600  •  sevelamer carbonate (RENVELA) tablet 2,400 mg, 2,400 mg, Oral, TID WITH MEALS, Natan Stone M.D., Stopped at 12/12/18 0730  •  traZODone (DESYREL) tablet 50 mg, 50 mg, Oral, QHS, Natan Stone M.D., Stopped at 12/11/18 2100  •  senna-docusate (PERICOLACE or SENOKOT S) 8.6-50 MG per tablet 2 Tab, 2 Tab, Oral, BID, Stopped at 12/12/18 0600 **AND** polyethylene glycol/lytes (MIRALAX) PACKET 1 Packet, 1 Packet, Oral, QDAY PRN **AND** magnesium hydroxide (MILK OF MAGNESIA) suspension 30 mL, 30 mL, Oral, QDAY PRN **AND** bisacodyl (DULCOLAX) suppository 10 mg, 10 mg, Rectal, QDAY PRN, Natan Stone M.D.  •  ondansetron (ZOFRAN) syringe/vial injection 4 mg, 4 mg, Intravenous, Q4HRS PRN, Natan Stone M.D., 4 mg at 12/10/18 1141  •  ondansetron (ZOFRAN ODT) dispertab 4 mg, 4 mg, Oral, Q4HRS PRN, Natan Stone M.D.  •  promethazine (PHENERGAN) tablet 12.5-25 mg, 12.5-25 mg, Oral, Q4HRS PRN, Natan Stone M.D., 25 mg at 12/10/18 2147  •  promethazine (PHENERGAN) suppository 12.5-25 mg, 12.5-25 mg, Rectal, Q4HRS PRN, Natan Stone M.D.  •  simethicone (MYLICON) chewable tab 80 mg, 80 mg, Oral, TID PRN, Renato Ho M.D.  •  prochlorperazine (COMPAZINE) injection 10 mg, 10 mg, Intravenous, Q6HRS PRN, Renato Ho M.D., 10 mg at 12/05/18 0931  •  heparin pf injection 500 Units, 500 Units, Intracatheter, PRN, Renato Ho M.D., 500 Units at 12/05/18 1835  •  acetaminophen (TYLENOL) tablet 650 mg, 650 mg, Oral, Q4HRS PRN, Renato Ho M.D., 650 mg at 12/05/18 1847      PHYSICAL EXAM    Vitals:    12/14/18 0400 12/14/18 0700 12/14/18 0800 12/14/18 0900   BP:       Pulse: 62 61 61 67   Resp: 16 16 17 19   Temp: 36.5 °C (97.7 °F)  36.7 °C (98 °F)    TempSrc: Temporal  Temporal    SpO2: 97% 98% 100% 98%   Weight:       Height:           Head/Neck: NCAT. no meningismus neg kernig neg  brudzinski. No obvious mass or heard bruit. No tender arteries or lost pulses. No rash of head or neck.    Skin: Warm, dry, intact. No rashes observed head/neck or body    Eyes/Funduscopic:  unable    Mental Status: Awake, alert, oriented x 2 month sept. Name/repeat/fluent/command follow intact. No neglect/extinction. Attention and concentration, recent & remote memory, Fund of Knowledge wnl.     Cranial Nerves:  CN II-XII intact. PERRL 2mm.   No afferent pupillary defect. EOM full. VF full. No nystagmus.       Motor:  bulk & tone wnl. strength intact . no abn mvmts.       Sensory: symmetric to sharp     Coordination: dysmetria absent    DTR's: intact/sym. no clonus. Toes mute.    Gait/Station: n/a fall concern      Labs:  Recent Labs      12/11/18   1033  12/13/18   0112  12/14/18   0500   WBC  5.0  6.7  8.4   RBC  3.70*  3.87*  3.48*   HEMOGLOBIN  11.2*  11.7*  10.2*   HEMATOCRIT  34.5*  35.0*  32.1*   MCV  93.2  90.4  92.2   MCH  30.3  30.2  29.3   MCHC  32.5*  33.4*  31.8*   RDW  45.4  43.8  45.7   PLATELETCT  157*  158*  137*   MPV  11.6  11.3  11.4     Recent Labs      12/12/18   0803  12/13/18   0112  12/14/18   0500   SODIUM  138  138  139   POTASSIUM  5.6*  4.4  4.5   CHLORIDE  98  98  99   CO2  20  26  23   GLUCOSE  80  93  89   BUN  52*  34*  55*   CREATININE  9.76*  6.85*  9.97*   CALCIUM  9.9  9.9  9.5                     Recent Labs      12/12/18   0803  12/13/18   0112  12/14/18   0500   SODIUM  138  138  139   POTASSIUM  5.6*  4.4  4.5   CHLORIDE  98  98  99   CO2  20  26  23   GLUCOSE  80  93  89   BUN  52*  34*  55*     Recent Labs      12/11/18   1033  12/12/18   0803  12/13/18   0112  12/14/18   0500   SODIUM  139  138  138  139   POTASSIUM  4.5  5.6*  4.4  4.5   CHLORIDE  96  98  98  99   CO2  31  20  26  23   BUN  43*  52*  34*  55*   CREATININE  8.15*  9.76*  6.85*  9.97*   MAGNESIUM  2.6*   --    --    --    PHOSPHORUS  6.8*   --    --    --    CALCIUM  9.5  9.9  9.9  9.5         Results for  orders placed or performed during the hospital encounter of 07/19/17   ECHOCARDIOGRAM COMP W/O CONT   Result Value Ref Range    Eject.Frac. MOD BP 58.38     Eject.Frac. MOD 4C 67.14     Eject.Frac. MOD 2C 67.81     Left Ventrical Ejection Fraction 60               Imaging: neuroimaging reviewed and directly visualized by me  CT-HEAD W/O   Final Result      Mild cerebral cortical atrophy. No mass effect or acute hemorrhage.      CT-ABDOMEN-PELVIS W/O   Final Result         Interstitial prominence in lung bases with pleural effusions consistent with pulmonary edema.      Anasarca and trace ascites.      No evidence of acute abdominal or pelvic pathology.      DX-CHEST-PORTABLE (1 VIEW)   Final Result      Enlarged cardiac silhouette with interstitial prominence consistent with pulmonary edema with probable left pleural fluid.      MR-BRAIN-W/O    (Results Pending)     eeg INTERPRETATION:  This is an abnormal 24 hrs video EEG recording in the awake, drowsy, and sleep state(s). A mild encephalopathy is suggested. Initially, patient with generalized, 2-3 HZ rhythmic sharps and slow waves, consistent with electrographic status epilepticus. With the addition of Levetiracetam, there has been improvement, however still breakthrough electrographic seizures and intermittent runs of electrographic status epilepticus throughout the study, as well as frequent generalized rhythmic or periodic sharps or rhythmic frontal theta activity. Clinical and radiological correlation is recommended.       Assessment/Plan:    PERIODIC NCSE ON EEG     CONTINUE LEV AT RENAL DOSE  STARTED LCS 50 Q12, LOAD 100 IV NOW STAT  CEEG  MRB W/O CONTRAST  Discuss seizure hygiene, triggers, and AED compliance/side effects/teratogenicity  Report to [employer] for driving restriction; advised no activities where LOC a danger until neuro MD clears as otpt     Will follow periodically for EEG results        Rakan Vila M.D.  , Neurohospitalist &  Stroke  Clinical Professor, [employer]  Diplomate, Neurology & Neuroimaging

## 2018-12-15 ENCOUNTER — APPOINTMENT (OUTPATIENT)
Dept: RADIOLOGY | Facility: MEDICAL CENTER | Age: 38
DRG: 091 | End: 2018-12-15
Attending: FAMILY MEDICINE
Payer: MEDICARE

## 2018-12-15 LAB
ANION GAP SERPL CALC-SCNC: 17 MMOL/L (ref 0–11.9)
BASOPHILS # BLD AUTO: 0.7 % (ref 0–1.8)
BASOPHILS # BLD: 0.06 K/UL (ref 0–0.12)
BUN SERPL-MCNC: 36 MG/DL (ref 8–22)
CALCIUM SERPL-MCNC: 10.1 MG/DL (ref 8.5–10.5)
CHLORIDE SERPL-SCNC: 96 MMOL/L (ref 96–112)
CO2 SERPL-SCNC: 25 MMOL/L (ref 20–33)
CREAT SERPL-MCNC: 7.05 MG/DL (ref 0.5–1.4)
CRP SERPL HS-MCNC: 3.03 MG/DL (ref 0–0.75)
EOSINOPHIL # BLD AUTO: 0.13 K/UL (ref 0–0.51)
EOSINOPHIL NFR BLD: 1.5 % (ref 0–6.9)
ERYTHROCYTE [DISTWIDTH] IN BLOOD BY AUTOMATED COUNT: 46.2 FL (ref 35.9–50)
GLUCOSE SERPL-MCNC: 87 MG/DL (ref 65–99)
HCT VFR BLD AUTO: 35.7 % (ref 37–47)
HGB BLD-MCNC: 11.7 G/DL (ref 12–16)
IMM GRANULOCYTES # BLD AUTO: 0.02 K/UL (ref 0–0.11)
IMM GRANULOCYTES NFR BLD AUTO: 0.2 % (ref 0–0.9)
LYMPHOCYTES # BLD AUTO: 1.89 K/UL (ref 1–4.8)
LYMPHOCYTES NFR BLD: 21.7 % (ref 22–41)
MCH RBC QN AUTO: 30.2 PG (ref 27–33)
MCHC RBC AUTO-ENTMCNC: 32.8 G/DL (ref 33.6–35)
MCV RBC AUTO: 92.2 FL (ref 81.4–97.8)
MONOCYTES # BLD AUTO: 1.46 K/UL (ref 0–0.85)
MONOCYTES NFR BLD AUTO: 16.8 % (ref 0–13.4)
NEUTROPHILS # BLD AUTO: 5.14 K/UL (ref 2–7.15)
NEUTROPHILS NFR BLD: 59.1 % (ref 44–72)
NRBC # BLD AUTO: 0 K/UL
NRBC BLD-RTO: 0 /100 WBC
PLATELET # BLD AUTO: 142 K/UL (ref 164–446)
PMV BLD AUTO: 11.4 FL (ref 9–12.9)
POTASSIUM SERPL-SCNC: 4.4 MMOL/L (ref 3.6–5.5)
PREALB SERPL-MCNC: 31 MG/DL (ref 18–38)
RBC # BLD AUTO: 3.87 M/UL (ref 4.2–5.4)
SODIUM SERPL-SCNC: 138 MMOL/L (ref 135–145)
WBC # BLD AUTO: 8.7 K/UL (ref 4.8–10.8)

## 2018-12-15 PROCEDURE — 700111 HCHG RX REV CODE 636 W/ 250 OVERRIDE (IP): Performed by: PSYCHIATRY & NEUROLOGY

## 2018-12-15 PROCEDURE — 700102 HCHG RX REV CODE 250 W/ 637 OVERRIDE(OP): Performed by: INTERNAL MEDICINE

## 2018-12-15 PROCEDURE — A9270 NON-COVERED ITEM OR SERVICE: HCPCS | Performed by: INTERNAL MEDICINE

## 2018-12-15 PROCEDURE — 86140 C-REACTIVE PROTEIN: CPT

## 2018-12-15 PROCEDURE — 80048 BASIC METABOLIC PNL TOTAL CA: CPT

## 2018-12-15 PROCEDURE — 95951 EEG-24 HR: CPT | Performed by: PSYCHIATRY & NEUROLOGY

## 2018-12-15 PROCEDURE — 4A10X4Z MONITORING OF CENTRAL NERVOUS ELECTRICAL ACTIVITY, EXTERNAL APPROACH: ICD-10-PCS | Performed by: PSYCHIATRY & NEUROLOGY

## 2018-12-15 PROCEDURE — 700105 HCHG RX REV CODE 258: Performed by: FAMILY MEDICINE

## 2018-12-15 PROCEDURE — 99232 SBSQ HOSP IP/OBS MODERATE 35: CPT | Performed by: INTERNAL MEDICINE

## 2018-12-15 PROCEDURE — 700111 HCHG RX REV CODE 636 W/ 250 OVERRIDE (IP): Performed by: HOSPITALIST

## 2018-12-15 PROCEDURE — C9254 INJECTION, LACOSAMIDE: HCPCS | Performed by: PSYCHIATRY & NEUROLOGY

## 2018-12-15 PROCEDURE — A9270 NON-COVERED ITEM OR SERVICE: HCPCS | Performed by: FAMILY MEDICINE

## 2018-12-15 PROCEDURE — 700102 HCHG RX REV CODE 250 W/ 637 OVERRIDE(OP): Performed by: HOSPITALIST

## 2018-12-15 PROCEDURE — 95951 EEG-24 HR: CPT | Mod: 26,52 | Performed by: PSYCHIATRY & NEUROLOGY

## 2018-12-15 PROCEDURE — 85025 COMPLETE CBC W/AUTO DIFF WBC: CPT

## 2018-12-15 PROCEDURE — 700101 HCHG RX REV CODE 250: Performed by: INTERNAL MEDICINE

## 2018-12-15 PROCEDURE — 700111 HCHG RX REV CODE 636 W/ 250 OVERRIDE (IP): Performed by: FAMILY MEDICINE

## 2018-12-15 PROCEDURE — 99232 SBSQ HOSP IP/OBS MODERATE 35: CPT | Performed by: HOSPITALIST

## 2018-12-15 PROCEDURE — 95951 EEG-24 HR: CPT | Mod: 52 | Performed by: PSYCHIATRY & NEUROLOGY

## 2018-12-15 PROCEDURE — A9270 NON-COVERED ITEM OR SERVICE: HCPCS | Performed by: HOSPITALIST

## 2018-12-15 PROCEDURE — 700102 HCHG RX REV CODE 250 W/ 637 OVERRIDE(OP): Performed by: FAMILY MEDICINE

## 2018-12-15 PROCEDURE — 770022 HCHG ROOM/CARE - ICU (200)

## 2018-12-15 PROCEDURE — 99231 SBSQ HOSP IP/OBS SF/LOW 25: CPT | Mod: 25 | Performed by: PSYCHIATRY & NEUROLOGY

## 2018-12-15 PROCEDURE — 84134 ASSAY OF PREALBUMIN: CPT

## 2018-12-15 PROCEDURE — 700105 HCHG RX REV CODE 258: Performed by: PSYCHIATRY & NEUROLOGY

## 2018-12-15 PROCEDURE — 70551 MRI BRAIN STEM W/O DYE: CPT

## 2018-12-15 RX ORDER — ASCORBIC ACID 500 MG
500 TABLET ORAL DAILY
Status: DISCONTINUED | OUTPATIENT
Start: 2018-12-16 | End: 2018-12-17 | Stop reason: HOSPADM

## 2018-12-15 RX ORDER — PROMETHAZINE HYDROCHLORIDE 25 MG/1
12.5-25 TABLET ORAL EVERY 4 HOURS PRN
Status: DISCONTINUED | OUTPATIENT
Start: 2018-12-15 | End: 2018-12-17 | Stop reason: HOSPADM

## 2018-12-15 RX ORDER — MIDAZOLAM HYDROCHLORIDE 1 MG/ML
5 INJECTION INTRAMUSCULAR; INTRAVENOUS
Status: COMPLETED | OUTPATIENT
Start: 2018-12-15 | End: 2018-12-15

## 2018-12-15 RX ORDER — SEVELAMER CARBONATE 800 MG/1
2400 TABLET, FILM COATED ORAL
Status: DISCONTINUED | OUTPATIENT
Start: 2018-12-15 | End: 2018-12-17 | Stop reason: HOSPADM

## 2018-12-15 RX ORDER — ACETAMINOPHEN 325 MG/1
650 TABLET ORAL EVERY 4 HOURS PRN
Status: DISCONTINUED | OUTPATIENT
Start: 2018-12-15 | End: 2018-12-17 | Stop reason: HOSPADM

## 2018-12-15 RX ORDER — AMOXICILLIN 250 MG
2 CAPSULE ORAL 2 TIMES DAILY
Status: DISCONTINUED | OUTPATIENT
Start: 2018-12-15 | End: 2018-12-17 | Stop reason: HOSPADM

## 2018-12-15 RX ORDER — POLYETHYLENE GLYCOL 3350 17 G/17G
1 POWDER, FOR SOLUTION ORAL
Status: DISCONTINUED | OUTPATIENT
Start: 2018-12-15 | End: 2018-12-17 | Stop reason: HOSPADM

## 2018-12-15 RX ORDER — HYDROXYCHLOROQUINE SULFATE 200 MG/1
200 TABLET, FILM COATED ORAL
Status: DISCONTINUED | OUTPATIENT
Start: 2018-12-15 | End: 2018-12-17 | Stop reason: HOSPADM

## 2018-12-15 RX ORDER — OMEPRAZOLE 20 MG/1
20 CAPSULE, DELAYED RELEASE ORAL DAILY
Status: DISCONTINUED | OUTPATIENT
Start: 2018-12-16 | End: 2018-12-17 | Stop reason: HOSPADM

## 2018-12-15 RX ORDER — TIZANIDINE 4 MG/1
8 TABLET ORAL EVERY EVENING
Status: DISCONTINUED | OUTPATIENT
Start: 2018-12-15 | End: 2018-12-17 | Stop reason: HOSPADM

## 2018-12-15 RX ORDER — AMLODIPINE BESYLATE 5 MG/1
5 TABLET ORAL
Status: DISCONTINUED | OUTPATIENT
Start: 2018-12-16 | End: 2018-12-17 | Stop reason: HOSPADM

## 2018-12-15 RX ORDER — BISACODYL 10 MG
10 SUPPOSITORY, RECTAL RECTAL
Status: DISCONTINUED | OUTPATIENT
Start: 2018-12-15 | End: 2018-12-17 | Stop reason: HOSPADM

## 2018-12-15 RX ORDER — SIMETHICONE 80 MG
80 TABLET,CHEWABLE ORAL 3 TIMES DAILY PRN
Status: DISCONTINUED | OUTPATIENT
Start: 2018-12-15 | End: 2018-12-17 | Stop reason: HOSPADM

## 2018-12-15 RX ORDER — OXYCODONE HYDROCHLORIDE 10 MG/1
10 TABLET ORAL EVERY 4 HOURS PRN
Status: DISCONTINUED | OUTPATIENT
Start: 2018-12-15 | End: 2018-12-17 | Stop reason: HOSPADM

## 2018-12-15 RX ORDER — OXYCODONE HYDROCHLORIDE 5 MG/1
5 TABLET ORAL EVERY 4 HOURS PRN
Status: DISCONTINUED | OUTPATIENT
Start: 2018-12-15 | End: 2018-12-17 | Stop reason: HOSPADM

## 2018-12-15 RX ORDER — FERROUS SULFATE 325(65) MG
325 TABLET ORAL
Status: DISCONTINUED | OUTPATIENT
Start: 2018-12-16 | End: 2018-12-17 | Stop reason: HOSPADM

## 2018-12-15 RX ADMIN — STANDARDIZED SENNA CONCENTRATE AND DOCUSATE SODIUM 2 TABLET: 8.6; 5 TABLET, FILM COATED ORAL at 17:49

## 2018-12-15 RX ADMIN — HEPARIN SODIUM 5000 UNITS: 5000 INJECTION, SOLUTION INTRAVENOUS; SUBCUTANEOUS at 13:48

## 2018-12-15 RX ADMIN — OXYCODONE HYDROCHLORIDE 10 MG: 10 TABLET ORAL at 19:20

## 2018-12-15 RX ADMIN — TRAZODONE HYDROCHLORIDE 50 MG: 50 TABLET ORAL at 21:06

## 2018-12-15 RX ADMIN — OXYCODONE HYDROCHLORIDE AND ACETAMINOPHEN 500 MG: 500 TABLET ORAL at 08:21

## 2018-12-15 RX ADMIN — MIDAZOLAM HYDROCHLORIDE 5 MG: 1 INJECTION, SOLUTION INTRAMUSCULAR; INTRAVENOUS at 16:50

## 2018-12-15 RX ADMIN — SUCRALFATE 1 G: 1 TABLET ORAL at 17:48

## 2018-12-15 RX ADMIN — OXYCODONE HYDROCHLORIDE 10 MG: 10 TABLET ORAL at 08:07

## 2018-12-15 RX ADMIN — SODIUM CHLORIDE 1000 MG: 9 INJECTION, SOLUTION INTRAVENOUS at 21:08

## 2018-12-15 RX ADMIN — SEVELAMER CARBONATE 2400 MG: 800 TABLET, FILM COATED ORAL at 17:48

## 2018-12-15 RX ADMIN — HEPARIN SODIUM 5000 UNITS: 5000 INJECTION, SOLUTION INTRAVENOUS; SUBCUTANEOUS at 21:05

## 2018-12-15 RX ADMIN — SUCRALFATE 1 G: 1 TABLET ORAL at 11:48

## 2018-12-15 RX ADMIN — STANDARDIZED SENNA CONCENTRATE AND DOCUSATE SODIUM 2 TABLET: 8.6; 5 TABLET, FILM COATED ORAL at 08:20

## 2018-12-15 RX ADMIN — SODIUM CHLORIDE 50 MG: 9 INJECTION, SOLUTION INTRAVENOUS at 06:00

## 2018-12-15 RX ADMIN — SODIUM CHLORIDE 100 MG: 9 INJECTION, SOLUTION INTRAVENOUS at 17:49

## 2018-12-15 RX ADMIN — AMLODIPINE BESYLATE 5 MG: 10 TABLET ORAL at 08:21

## 2018-12-15 RX ADMIN — OXYCODONE HYDROCHLORIDE 10 MG: 10 TABLET ORAL at 03:18

## 2018-12-15 RX ADMIN — VITAMIN D, TAB 1000IU (100/BT) 1000 UNITS: 25 TAB at 08:21

## 2018-12-15 RX ADMIN — HEPARIN SODIUM 5000 UNITS: 5000 INJECTION, SOLUTION INTRAVENOUS; SUBCUTANEOUS at 05:59

## 2018-12-15 RX ADMIN — TIZANIDINE 8 MG: 4 TABLET ORAL at 21:07

## 2018-12-15 RX ADMIN — OMEPRAZOLE 20 MG: 20 CAPSULE, DELAYED RELEASE ORAL at 08:22

## 2018-12-15 RX ADMIN — LIDOCAINE 1 PATCH: 50 PATCH TOPICAL at 16:10

## 2018-12-15 RX ADMIN — OXYCODONE HYDROCHLORIDE 5 MG: 5 TABLET ORAL at 11:49

## 2018-12-15 RX ADMIN — HYDROXYCHLOROQUINE SULFATE 200 MG: 200 TABLET, FILM COATED ORAL at 21:08

## 2018-12-15 ASSESSMENT — PAIN SCALES - GENERAL
PAINLEVEL_OUTOF10: 8
PAINLEVEL_OUTOF10: 4
PAINLEVEL_OUTOF10: 5
PAINLEVEL_OUTOF10: 5
PAINLEVEL_OUTOF10: 0
PAINLEVEL_OUTOF10: 6
PAINLEVEL_OUTOF10: 8
PAINLEVEL_OUTOF10: 8
PAINLEVEL_OUTOF10: 6
PAINLEVEL_OUTOF10: 4
PAINLEVEL_OUTOF10: 7
PAINLEVEL_OUTOF10: 8
PAINLEVEL_OUTOF10: 6
PAINLEVEL_OUTOF10: 3
PAINLEVEL_OUTOF10: 5
PAINLEVEL_OUTOF10: 5

## 2018-12-15 ASSESSMENT — ENCOUNTER SYMPTOMS
WEAKNESS: 0
STRIDOR: 0
HALLUCINATIONS: 0
BLOOD IN STOOL: 0
VOMITING: 0
BACK PAIN: 0
ORTHOPNEA: 0
SPUTUM PRODUCTION: 0
FEVER: 0
FALLS: 0
FLANK PAIN: 0
EYE DISCHARGE: 0
SPEECH CHANGE: 0
EYE REDNESS: 0
SEIZURES: 0
FOCAL WEAKNESS: 0
BLURRED VISION: 0
COUGH: 0
DIARRHEA: 0
MEMORY LOSS: 0
HEADACHES: 0
EYE PAIN: 0
HEMOPTYSIS: 0
NAUSEA: 0
NERVOUS/ANXIOUS: 0
PALPITATIONS: 0
LOSS OF CONSCIOUSNESS: 0
NECK PAIN: 0
SHORTNESS OF BREATH: 0

## 2018-12-15 ASSESSMENT — LIFESTYLE VARIABLES: SUBSTANCE_ABUSE: 0

## 2018-12-15 NOTE — DIETARY
"Nutrition services:  Nutrition Support Assessment     Day 10 of admit.  Debby Carrizales is a 38 y.o. female with admitting DX of Abdominal pain, ESRD, Status epilepticus     Current problem list:  1. Drug seeking behavior (chronic)  2. ESRD (end stage renal disease)  3. Lupus (systemic lupus erythematosus)  4. Status epilepticus  5. Avascular necrosis  6. Hypertension     Assessment:  Estimated Nutritional Needs: based on:   Height: 165.1 cm (5' 5\")  Weight: 78.5 kg (173 lb 1 oz)  Weight to Use in Calculations: 81 kg (178 lb 9.2 oz) - most recent stand up scale wt  Ideal Body Weight: 56.7 kg (125 lb)  Percent Ideal Body Weight: 142.9  Body mass index is 29.7 kg/m².    Calculation/Equation: MSJ x 1.1 - 1.2 = 1653 - 1803 kcals/day  Total Calories / day: 1653 - 1863 (Calories / k - 23)  Total Grams Protein / day: 97 - 121 (Grams Protein / k.2 - 1.5)     Evaluation:   1. Admitted with abdominal pain; HD pt, missed dialysis.  2. Pt presented with weakness, hyperkalemia, uremia and volume overload.  3. Pt was seen by wound team  for partial thickness wound to left buttock; no nutrition recommendations at this time.  4. Pt lethargic, unable to follow commands, status epilepticus with severe confusion.  5. EEG .  6. Transferred to the ICU for higher level of care.  7. Swallow evaluation by SLP ; recommended NPO with Cortrak.  8. Cortrak placed and verified.  9. Pt in not intubated or sedated.  10. BUN 36, Creatinine 7.05  11. Pertinent meds:  Ascorbic acid, Ferrous sulfate, Heparin, Lactulose, Prilosec, Pericolace, Carafate, Vitamin D.     Recommendations/Plan:  1. Start Replete with Fiber @ 25 mL/hr and advance per protocol to goal rate of 75 mL/hr, providing 1800 kcals, 115 grams of protein and 1494 mL of free water per day.  2. Fluids per MD.  3. RD to monitor wt and lab trends.  4. PO diet when safe/appropriate per SLP/MD and pt can adequately consume.    RD following.                "

## 2018-12-15 NOTE — DIETARY
Nutrition Services:  Brief Update    · RD previously consulted for tube feeding.  · TF assessment completed, however pt's diet was advanced to Renal before tube feedings commenced.  · Pt is currently on a Renal diet and per ADL flow sheet, PO 25-50% for one meal thus far.  · RD will continue to follow for adequate PO intake.    Recommendations/Plan:  1. Encourage intake of meals.  2. Document intake of all meals as % taken in ADLs to provide interdisciplinary communication across all shifts.   3. Monitor weight.  4. Nutrition rep will continue to see patient for ongoing meal and snack preferences.   5. Add Boost Glucose Control as needed if PO is inadequate or per pt request.

## 2018-12-15 NOTE — PROGRESS NOTES
Pt continues to complain of 8/10 pain and is grimacing and moaning. Spoke with Dr. De León, new orders for oxycodone, and zanaflex per MD

## 2018-12-15 NOTE — PROGRESS NOTES
[facility] Medicine Daily Progress Note    Date of Service  12/15/2018    Chief Complaint  38 y.o. female history of end-stage renal disease on dialysis, C. difficile colitis, ESBL, fibromyalgia, hypertension, lupus, pyelonephritis admitted 12/4/2018 with complaints of abdominal pain, constipation, missed hemodialysis, uremia, hyperkalemia    Interval Problem Update      Afebrile  Tolerating diet  Anuric  Left chest port  On C EEG no clinical seizures overnight  On RA          Consultants/Specialty  Nephrology  Neurology  Code Status  Full code    Disposition  Likely home    Review of Systems  Review of Systems   Unable to perform ROS: Mental acuity   Constitutional: Positive for malaise/fatigue. Negative for fever.   HENT: Negative for congestion, ear discharge and nosebleeds.    Eyes: Negative for blurred vision, pain, discharge and redness.   Respiratory: Negative for cough, hemoptysis, sputum production, shortness of breath and stridor.    Cardiovascular: Negative for chest pain, palpitations, orthopnea and leg swelling.   Gastrointestinal: Negative for blood in stool, diarrhea, melena, nausea and vomiting. Abdominal pain: Chronic. Constipation: Chronic.   Genitourinary: Negative for dysuria, flank pain and hematuria.   Musculoskeletal: Positive for joint pain. Negative for back pain, falls and neck pain.   Skin: Negative.    Neurological: Negative for speech change, focal weakness, seizures, loss of consciousness, weakness and headaches.   Psychiatric/Behavioral: Negative for hallucinations, memory loss and substance abuse. The patient is not nervous/anxious.    All other systems reviewed and are negative.  No change in review of system    Physical Exam  Temp:  [36.2 °C (97.1 °F)-37 °C (98.6 °F)] 37 °C (98.6 °F)  Pulse:  [] 92  Resp:  [13-23] 16    Physical Exam   Constitutional: She is oriented to person, place, and time. She appears well-developed and well-nourished.   HENT:   Head: Normocephalic and  atraumatic.   Right Ear: External ear normal.   Left Ear: External ear normal.   Mouth/Throat: No oropharyngeal exudate.   On continuous EEG   Eyes: Pupils are equal, round, and reactive to light. Right eye exhibits no discharge. Left eye exhibits no discharge.   Neck: Neck supple. No JVD present. No tracheal deviation present.   Cardiovascular: Normal rate and regular rhythm.  Exam reveals no friction rub.    No murmur heard.  Pulmonary/Chest: Effort normal and breath sounds normal. No respiratory distress. She has no wheezes. She exhibits no tenderness.   Port accessed but intact site   Abdominal: Soft. Bowel sounds are normal. She exhibits no distension. There is no tenderness. There is no rebound.   Musculoskeletal: She exhibits edema. She exhibits no tenderness.   Neurological: She is alert and oriented to person, place, and time. No cranial nerve deficit. She exhibits normal muscle tone.   Lethargic   follows command     Skin: Skin is warm and dry. She is not diaphoretic. No cyanosis. Nails show no clubbing.   Psychiatric: She has a normal mood and affect. Her speech is normal and behavior is normal. Thought content normal.   Nursing note and vitals reviewed.  No significant change in exam    Fluids    Intake/Output Summary (Last 24 hours) at 12/15/18 0905  Last data filed at 12/15/18 0600   Gross per 24 hour   Intake              750 ml   Output             2000 ml   Net            -1250 ml       Laboratory  Recent Labs      12/13/18   0112  12/14/18   0500  12/15/18   0329   WBC  6.7  8.4  8.7   RBC  3.87*  3.48*  3.87*   HEMOGLOBIN  11.7*  10.2*  11.7*   HEMATOCRIT  35.0*  32.1*  35.7*   MCV  90.4  92.2  92.2   MCH  30.2  29.3  30.2   MCHC  33.4*  31.8*  32.8*   RDW  43.8  45.7  46.2   PLATELETCT  158*  137*  142*   MPV  11.3  11.4  11.4     Recent Labs      12/13/18   0112  12/14/18   0500  12/15/18   0329   SODIUM  138  139  138   POTASSIUM  4.4  4.5  4.4   CHLORIDE  98  99  96   CO2  26  23  25   GLUCOSE   93  89  87   BUN  34*  55*  36*   CREATININE  6.85*  9.97*  7.05*   CALCIUM  9.9  9.5  10.1                   Imaging  DX-ABDOMEN FOR TUBE PLACEMENT   Final Result      Feeding tube is looped upon itself, with a loop extending into the proximal duodenum, however tip remains at the gastric fundus.      DX-ABDOMEN FOR TUBE PLACEMENT   Final Result      Feeding tube looped upon itself with tip at the gastric fundus.      CT-HEAD W/O   Final Result      Mild cerebral cortical atrophy. No mass effect or acute hemorrhage.      CT-ABDOMEN-PELVIS W/O   Final Result         Interstitial prominence in lung bases with pleural effusions consistent with pulmonary edema.      Anasarca and trace ascites.      No evidence of acute abdominal or pelvic pathology.      DX-CHEST-PORTABLE (1 VIEW)   Final Result      Enlarged cardiac silhouette with interstitial prominence consistent with pulmonary edema with probable left pleural fluid.      MR-BRAIN-W/O    (Results Pending)        Assessment/Plan  * Status epilepticus (HCC)- (present on admission)   Assessment & Plan    Continuous EEG abnormal but improved  Patient is also clinically improved  Continue Keppra  Lacosamide dose increased to 100 mg every 12  Monitor EEG and clinically  Bedside swallow eval and diet as tolerated  Follow-up on MRI of brain         ESRD (end stage renal disease) on dialysis (HCC)- (present on admission)   Assessment & Plan    On hemodialysis Monday Wednesday Friday  Nephrology following continue to monitor     Lupus (systemic lupus erythematosus) (HCC)- (present on admission)   Assessment & Plan    Continue Plaquenil     Avascular necrosis (HCC)- (present on admission)   Assessment & Plan    Report bilateral hips.  Chronic.  Follow with orthopedics as outpatient         Hypertension- (present on admission)   Assessment & Plan    Controlled on current meds monitor         VTE prophylaxis: Heparin

## 2018-12-15 NOTE — PROGRESS NOTES
12 hour chart check  SB-ST:  MS: 0.14 / 0.10 / 0.36  SBP: 's    · 2 RN skin check complete   · Devices in place BP cuff, continuous pulse ox, EEG and cardiac monitoring  · Skin assessed under devices. No new breakdown noted  · Partial thickness stable on L buttock. Generalized scaring and stretch marks.   · The following interventions in place: waffle cushion, pillows for support and repositioning

## 2018-12-15 NOTE — CARE PLAN
Problem: Safety - Medical Restraint  Goal: Free from restraint(s) (Restraint for Interference with Medical Device)  INTERVENTIONS:  1. ONCE/SHIFT or MINIMUM Q12H: Assess and document the continuing need for restraints  2. Q24H: Continued use of restraint requires LIP to perform face to face examination and written order  3. Identify and implement measures to help patient regain control     Outcome: MET Date Met: 12/15/18  restraints discontinued      Problem: Skin Integrity  Goal: Risk for impaired skin integrity will decrease  Outcome: PROGRESSING AS EXPECTED  Full bed bath and CHG bath performed. Waffle cushion in place

## 2018-12-15 NOTE — PROGRESS NOTES
Nephrology Daily Progress Note    Date of Service  12/15/2018    Chief Complaint  38 y.o. female admitted 12/4/2018 with ESRD, anemia  Found to have seizures    Interval Problem Update  Mental status at baseline.  No shortness of breath.    Review of Systems  Review of Systems   All other systems reviewed and are negative.       Physical Exam  Temp:  [36.2 °C (97.1 °F)-37 °C (98.6 °F)] 36.2 °C (97.1 °F)  Pulse:  [] 103  Resp:  [13-23] 15    Physical Exam   Constitutional: She appears well-developed.   Cardiovascular: Normal rate and regular rhythm.    Pulmonary/Chest: Effort normal and breath sounds normal.   Skin: Skin is warm and dry. She is not diaphoretic.       Fluids    Intake/Output Summary (Last 24 hours) at 12/15/18 1039  Last data filed at 12/15/18 1000   Gross per 24 hour   Intake           962.17 ml   Output             2000 ml   Net         -1037.83 ml       Laboratory  Recent Labs      12/13/18   0112  12/14/18   0500  12/15/18   0329   WBC  6.7  8.4  8.7   RBC  3.87*  3.48*  3.87*   HEMOGLOBIN  11.7*  10.2*  11.7*   HEMATOCRIT  35.0*  32.1*  35.7*   MCV  90.4  92.2  92.2   MCH  30.2  29.3  30.2   MCHC  33.4*  31.8*  32.8*   RDW  43.8  45.7  46.2   PLATELETCT  158*  137*  142*   MPV  11.3  11.4  11.4     Recent Labs      12/13/18   0112  12/14/18   0500  12/15/18   0329   SODIUM  138  139  138   POTASSIUM  4.4  4.5  4.4   CHLORIDE  98  99  96   CO2  26  23  25   GLUCOSE  93  89  87   BUN  34*  55*  36*   CREATININE  6.85*  9.97*  7.05*   CALCIUM  9.9  9.5  10.1                   Imaging  DX-ABDOMEN FOR TUBE PLACEMENT   Final Result      Feeding tube is looped upon itself, with a loop extending into the proximal duodenum, however tip remains at the gastric fundus.      DX-ABDOMEN FOR TUBE PLACEMENT   Final Result      Feeding tube looped upon itself with tip at the gastric fundus.      CT-HEAD W/O   Final Result      Mild cerebral cortical atrophy. No mass effect or acute hemorrhage.       CT-ABDOMEN-PELVIS W/O   Final Result         Interstitial prominence in lung bases with pleural effusions consistent with pulmonary edema.      Anasarca and trace ascites.      No evidence of acute abdominal or pelvic pathology.      DX-CHEST-PORTABLE (1 VIEW)   Final Result      Enlarged cardiac silhouette with interstitial prominence consistent with pulmonary edema with probable left pleural fluid.      MR-BRAIN-W/O    (Results Pending)        Assessment/Plan    1. ESRD -had dialysis yesterday, no plans for dialysis today.  2. Volume overload -euvolemic  3.  Anemia of chronic kidney disease, no Epogen    -No plans for dialysis today  -We will follow

## 2018-12-15 NOTE — CARE PLAN
Problem: Safety - Medical Restraint  Goal: Remains free of injury from restraints (Restraint for Interference with Medical Device)  INTERVENTIONS:  1. Determine that other, less restrictive measures have been tried or would not be effective before applying the restraint  2. Evaluate the patient's condition at the time of restraint application  3. Inform patient/family regarding the reason for restraint  4. Q2H: Monitor safety, psychosocial status, comfort, nutrition and hydration     Outcome: PROGRESSING AS EXPECTED    Goal: Free from restraint(s) (Restraint for Interference with Medical Device)  INTERVENTIONS:  1. ONCE/SHIFT or MINIMUM Q12H: Assess and document the continuing need for restraints  2. Q24H: Continued use of restraint requires LIP to perform face to face examination and written order  3. Identify and implement measures to help patient regain control     Outcome: PROGRESSING AS EXPECTED  Pt understands reason for restraints, and is understanding. She is becoming more alert and appropriate.     Problem: Skin Integrity  Goal: Risk for impaired skin integrity will decrease  Outcome: PROGRESSING AS EXPECTED  Pt skin remains intact, is turning herself in bed.   Intervention: Assess and monitor skin integrity, appearance and/or temperature  Skin is warm, dry, and intact. No new wounds found on inspection.

## 2018-12-15 NOTE — PROGRESS NOTES
MD Vila from Neurology called to discuss possibility of patient mobilizing later today. MD Vila gave orders to continue bedrest due to high risk for seizure activity. Patient informed of continued bedrest and reasons. Will re-evaluate tomorrow

## 2018-12-15 NOTE — CARE PLAN
Problem: Nutritional:  Goal: Nutrition support tolerated and meeting greater than 85% of estimated needs  Outcome: DISCHARGED-GOAL NOT MET Date Met: 12/15/18  Pt diet has advanced to Renal. Tube feeding no longer indicated.

## 2018-12-15 NOTE — PROGRESS NOTES
Date of Admission: 12/4/2018    reason for follow up: SEIZURES, ENCEPHALOPATHY    24hr INTERVAL HX:  RESOLVED, FEELING NORMAL NOW.  Severe CONFUSION SUBSIDED.  No other complaints.     O-   Allergies:   Allergies   Allergen Reactions   • Lorazepam [Ativan] Anxiety     Patient becomes severely paranoid and agitated   • Morphine Itching     Tolerates Dilaudid   • Seasonal Runny Nose and Itching     Hay fever, sabiha brush         Current Facility-Administered Medications:   •  oxyCODONE immediate-release (ROXICODONE) tablet 5 mg, 5 mg, Oral, Q4HRS PRN **OR** oxyCODONE immediate release (ROXICODONE) tablet 10 mg, 10 mg, Oral, Q4HRS PRN, Steve De León M.D., 10 mg at 12/15/18 0807  •  tizanidine (ZANAFLEX) tablet 8 mg, 8 mg, Oral, Q EVENING, Steve De León M.D.  •  lacosamide (VIMPAT) 100 mg in  mL ivpb, 100 mg, Intravenous, Q12HRS, Aman Flores M.D.  •  heparin injection 5,000 Units, 5,000 Units, Subcutaneous, Q8HRS, Mik Ron M.D., 5,000 Units at 12/15/18 0559  •  Pharmacy Consult: Enteral tube feeding - review meds/change route/product selection, , Other, PRN, Mik Ron M.D.  •  amLODIPine (NORVASC) tablet 5 mg, 5 mg, Per NG Tube, Q DAY, Carlo Herrera M.D., 5 mg at 12/15/18 0821  •  ascorbic acid tablet 500 mg, 500 mg, Feeding Tube, DAILY, Carlo Herrera M.D., 500 mg at 12/15/18 0821  •  ferrous sulfate (FEOSOL) 220 (44 Fe) MG/5ML solution 220 mg, 220 mg, Feeding Tube, DAILY, Carlo Herrera M.D., Stopped at 12/15/18 0600  •  hydroxychloroquine (PLAQUENIL) tablet 200 mg, 200 mg, Feeding Tube, QHS, Carlo Herrera M.D., 200 mg at 12/14/18 2338  •  omeprazole 2 mg/mL in sodium bicarbonate (PRILOSEC) oral susp 20 mg, 20 mg, Feeding Tube, DAILY, aCrlo Herrera M.D., 20 mg at 12/15/18 0822  •  senna-docusate (PERICOLACE or SENOKOT S) 8.6-50 MG per tablet 2 Tab, 2 Tab, Feeding Tube, BID, 2 Tab at 12/15/18 0820 **AND** polyethylene glycol/lytes (MIRALAX) PACKET 1 Packet, 1 Packet, Feeding Tube,  QDAY PRN **AND** magnesium hydroxide (MILK OF MAGNESIA) suspension 30 mL, 30 mL, Feeding Tube, QDAY PRN **AND** bisacodyl (DULCOLAX) suppository 10 mg, 10 mg, Rectal, QDAY PRN, Carlo Herrera M.D.  •  acetaminophen (TYLENOL) tablet 650 mg, 650 mg, Feeding Tube, Q4HRS PRN, Carlo Herrera M.D.  •  promethazine (PHENERGAN) tablet 12.5-25 mg, 12.5-25 mg, Feeding Tube, Q4HRS PRN, Carlo Herrera M.D.  •  simethicone (MYLICON) chewable tab 80 mg, 80 mg, Feeding Tube, TID PRN, Carlo Herrera M.D., 80 mg at 12/14/18 2337  •  levETIRAcetam (KEPPRA) 1,000 mg in  mL IVPB, 1,000 mg, Intravenous, Q24HR, Rafael Yu M.D., Stopped at 12/14/18 2244  •  naloxone (NARCAN) injection 0.4 mg, 0.4 mg, Intravenous, Q HOUR PRN, Rafael uY M.D.  •  lidocaine (LIDODERM) 5 % 1 Patch, 1 Patch, Transdermal, Q24HR, Renato Ho M.D., 1 Patch at 12/14/18 1712  •  sucralfate (CARAFATE) tablet 1 g, 1 g, Oral, Q6HRS, Renato Ho M.D., Stopped at 12/12/18 0000  •  vitamin D (cholecalciferol) tablet 1,000 Units, 1,000 Units, Oral, DAILY, Natan Stone M.D., 1,000 Units at 12/15/18 0821  •  traZODone (DESYREL) tablet 50 mg, 50 mg, Oral, QHS, Natan Stone M.D., 50 mg at 12/14/18 2337  •  ondansetron (ZOFRAN) syringe/vial injection 4 mg, 4 mg, Intravenous, Q4HRS PRN, Natan Stone M.D., 4 mg at 12/10/18 1141  •  ondansetron (ZOFRAN ODT) dispertab 4 mg, 4 mg, Oral, Q4HRS PRN, Natan Stone M.D.  •  promethazine (PHENERGAN) suppository 12.5-25 mg, 12.5-25 mg, Rectal, Q4HRS PRN, Natan Stone M.D.  •  prochlorperazine (COMPAZINE) injection 10 mg, 10 mg, Intravenous, Q6HRS PRN, Renato Ho M.D., 10 mg at 12/05/18 0931  •  heparin pf injection 500 Units, 500 Units, Intracatheter, PRN, Renato Ho M.D., 500 Units at 12/05/18 1835      PHYSICAL EXAM    Vitals:    12/15/18 0700 12/15/18 0800 12/15/18 0807 12/15/18 0900   BP:       Pulse: 100 93 65 94   Resp: 19 (!) 23 14 16    Temp:  36.2 °C (97.1 °F)     TempSrc:       SpO2: 92% 96% 98% 96%   Weight:       Height:           Head/Neck: NCAT. no meningismus neg kernig neg brudzinski. No obvious mass or heard bruit. No tender arteries or lost pulses. No rash of head or neck.    Skin: Warm, dry, intact. No rashes observed head/neck or body    Eyes/Funduscopic: unable    Mental Status: Awake, alert, oriented x 3. Name/repeat/fluent/command follow intact. No neglect/extinction. Attention and concentration, recent & remote memory, Fund of Knowledge wnl.     Cranial Nerves:   CN II-XII intact. PERRL 3MM.   No afferent pupillary defect. EOM full. VF full. No nystagmus.       Motor:  bulk & tone wnl. strength intact . no abn mvmts.       Sensory: symmetric to sharp     Coordination: dysmetria absent    DTR's: intact/sym. no clonus. Toes mute.    Gait/Station: n/a fall concern      Labs:  Recent Labs      12/13/18 0112 12/14/18   0500  12/15/18   0329   WBC  6.7  8.4  8.7   RBC  3.87*  3.48*  3.87*   HEMOGLOBIN  11.7*  10.2*  11.7*   HEMATOCRIT  35.0*  32.1*  35.7*   MCV  90.4  92.2  92.2   MCH  30.2  29.3  30.2   MCHC  33.4*  31.8*  32.8*   RDW  43.8  45.7  46.2   PLATELETCT  158*  137*  142*   MPV  11.3  11.4  11.4     Recent Labs      12/13/18   0112 12/14/18   0500  12/15/18   0329   SODIUM  138  139  138   POTASSIUM  4.4  4.5  4.4   CHLORIDE  98  99  96   CO2  26  23  25   GLUCOSE  93  89  87   BUN  34*  55*  36*   CREATININE  6.85*  9.97*  7.05*   CALCIUM  9.9  9.5  10.1                     Recent Labs      12/13/18   0112  12/14/18   0500  12/15/18   0329   SODIUM  138  139  138   POTASSIUM  4.4  4.5  4.4   CHLORIDE  98  99  96   CO2  26  23  25   GLUCOSE  93  89  87   BUN  34*  55*  36*     Recent Labs      12/13/18   0112  12/14/18   0500  12/15/18   0329   SODIUM  138  139  138   POTASSIUM  4.4  4.5  4.4   CHLORIDE  98  99  96   CO2  26  23  25   BUN  34*  55*  36*   CREATININE  6.85*  9.97*  7.05*   CALCIUM  9.9  9.5  10.1          Results for orders placed or performed during the hospital encounter of 07/19/17   ECHOCARDIOGRAM COMP W/O CONT   Result Value Ref Range    Eject.Frac. MOD BP 58.38     Eject.Frac. MOD 4C 67.14     Eject.Frac. MOD 2C 67.81     Left Ventrical Ejection Fraction 60               Imaging: neuroimaging reviewed and directly visualized by me  DX-ABDOMEN FOR TUBE PLACEMENT   Final Result      Feeding tube is looped upon itself, with a loop extending into the proximal duodenum, however tip remains at the gastric fundus.      DX-ABDOMEN FOR TUBE PLACEMENT   Final Result      Feeding tube looped upon itself with tip at the gastric fundus.      CT-HEAD W/O   Final Result      Mild cerebral cortical atrophy. No mass effect or acute hemorrhage.      CT-ABDOMEN-PELVIS W/O   Final Result         Interstitial prominence in lung bases with pleural effusions consistent with pulmonary edema.      Anasarca and trace ascites.      No evidence of acute abdominal or pelvic pathology.      DX-CHEST-PORTABLE (1 VIEW)   Final Result      Enlarged cardiac silhouette with interstitial prominence consistent with pulmonary edema with probable left pleural fluid.      MR-BRAIN-W/O    (Results Pending)   EEG REPORT REVIEWED WITH MAYNOR CARRIZALES  INTERPRETATION:  This is an abnormal extended video EEG recording in the awake, drowsy, and sleep state(s). A mild encephalopathy is suggested. Frequent runs of generalized, frontally predominant, 2-3 HZ spike and slow waves, however no further seizures captured.The patient remains at an increased risk for seizures. The findings suggest cortical irritability. Clinical and radiological correlation is recommended.     Updates provided to Dr. Vila.        Assessment/Plan:    PERIODIC NCSE ON EEG, RESOLVED     CONTINUE LEV AT RENAL DOSE/SCHEDULE  INCREASED TO  Q12  Discuss seizure hygiene, triggers, and AED compliance/side effects/teratogenicity  Report to [facility] for driving restriction; advised no activities  where LOC a danger until neuro MD clears as otpt    MRB W/O CONTRAST  No further neuro workup as inpatient if aforementioned studies WNL & maintains neuro baseline.  Neuro sign off, reconsult PRN.  F/u otpt Neuro MD MAYNOR CALL [id number] FOR APTMT WITH NP NEXT AVAILABLE.                Rakan Vila M.D.  , Neurohospitalist & Stroke  Clinical Professor, [employer]  Diplomate, Neurology & Neuroimaging

## 2018-12-16 LAB
ANION GAP SERPL CALC-SCNC: 17 MMOL/L (ref 0–11.9)
BUN SERPL-MCNC: 55 MG/DL (ref 8–22)
CALCIUM SERPL-MCNC: 9.9 MG/DL (ref 8.5–10.5)
CHLORIDE SERPL-SCNC: 96 MMOL/L (ref 96–112)
CO2 SERPL-SCNC: 23 MMOL/L (ref 20–33)
CREAT SERPL-MCNC: 9.44 MG/DL (ref 0.5–1.4)
ERYTHROCYTE [DISTWIDTH] IN BLOOD BY AUTOMATED COUNT: 45.6 FL (ref 35.9–50)
GLUCOSE SERPL-MCNC: 83 MG/DL (ref 65–99)
HCT VFR BLD AUTO: 30.2 % (ref 37–47)
HGB BLD-MCNC: 9.9 G/DL (ref 12–16)
MCH RBC QN AUTO: 30.7 PG (ref 27–33)
MCHC RBC AUTO-ENTMCNC: 32.8 G/DL (ref 33.6–35)
MCV RBC AUTO: 93.8 FL (ref 81.4–97.8)
PLATELET # BLD AUTO: 138 K/UL (ref 164–446)
PMV BLD AUTO: 11.6 FL (ref 9–12.9)
POTASSIUM SERPL-SCNC: 4.4 MMOL/L (ref 3.6–5.5)
RBC # BLD AUTO: 3.22 M/UL (ref 4.2–5.4)
SODIUM SERPL-SCNC: 136 MMOL/L (ref 135–145)
WBC # BLD AUTO: 5.4 K/UL (ref 4.8–10.8)

## 2018-12-16 PROCEDURE — 700111 HCHG RX REV CODE 636 W/ 250 OVERRIDE (IP): Performed by: HOSPITALIST

## 2018-12-16 PROCEDURE — 700101 HCHG RX REV CODE 250: Performed by: INTERNAL MEDICINE

## 2018-12-16 PROCEDURE — 85027 COMPLETE CBC AUTOMATED: CPT

## 2018-12-16 PROCEDURE — A9270 NON-COVERED ITEM OR SERVICE: HCPCS | Performed by: INTERNAL MEDICINE

## 2018-12-16 PROCEDURE — 700102 HCHG RX REV CODE 250 W/ 637 OVERRIDE(OP): Performed by: INTERNAL MEDICINE

## 2018-12-16 PROCEDURE — A9270 NON-COVERED ITEM OR SERVICE: HCPCS | Performed by: HOSPITALIST

## 2018-12-16 PROCEDURE — 700105 HCHG RX REV CODE 258

## 2018-12-16 PROCEDURE — 700111 HCHG RX REV CODE 636 W/ 250 OVERRIDE (IP): Performed by: FAMILY MEDICINE

## 2018-12-16 PROCEDURE — 700105 HCHG RX REV CODE 258: Performed by: FAMILY MEDICINE

## 2018-12-16 PROCEDURE — 99232 SBSQ HOSP IP/OBS MODERATE 35: CPT | Performed by: HOSPITALIST

## 2018-12-16 PROCEDURE — 700102 HCHG RX REV CODE 250 W/ 637 OVERRIDE(OP): Performed by: HOSPITALIST

## 2018-12-16 PROCEDURE — 700111 HCHG RX REV CODE 636 W/ 250 OVERRIDE (IP): Performed by: PSYCHIATRY & NEUROLOGY

## 2018-12-16 PROCEDURE — A9270 NON-COVERED ITEM OR SERVICE: HCPCS | Performed by: FAMILY MEDICINE

## 2018-12-16 PROCEDURE — 700105 HCHG RX REV CODE 258: Performed by: PSYCHIATRY & NEUROLOGY

## 2018-12-16 PROCEDURE — C9254 INJECTION, LACOSAMIDE: HCPCS | Performed by: PSYCHIATRY & NEUROLOGY

## 2018-12-16 PROCEDURE — 770001 HCHG ROOM/CARE - MED/SURG/GYN PRIV*

## 2018-12-16 PROCEDURE — 80048 BASIC METABOLIC PNL TOTAL CA: CPT

## 2018-12-16 PROCEDURE — 99232 SBSQ HOSP IP/OBS MODERATE 35: CPT | Performed by: INTERNAL MEDICINE

## 2018-12-16 PROCEDURE — 700102 HCHG RX REV CODE 250 W/ 637 OVERRIDE(OP): Performed by: FAMILY MEDICINE

## 2018-12-16 RX ORDER — SODIUM CHLORIDE 9 MG/ML
INJECTION, SOLUTION INTRAVENOUS
Status: COMPLETED
Start: 2018-12-16 | End: 2018-12-17

## 2018-12-16 RX ORDER — CETIRIZINE HYDROCHLORIDE 10 MG/1
10 TABLET ORAL
Status: DISCONTINUED | OUTPATIENT
Start: 2018-12-16 | End: 2018-12-17 | Stop reason: HOSPADM

## 2018-12-16 RX ORDER — PREGABALIN 100 MG/1
100 CAPSULE ORAL 3 TIMES DAILY
Status: DISCONTINUED | OUTPATIENT
Start: 2018-12-16 | End: 2018-12-17 | Stop reason: HOSPADM

## 2018-12-16 RX ADMIN — SUCRALFATE 1 G: 1 TABLET ORAL at 11:31

## 2018-12-16 RX ADMIN — SUCRALFATE 1 G: 1 TABLET ORAL at 23:47

## 2018-12-16 RX ADMIN — SEVELAMER CARBONATE 2400 MG: 800 TABLET, FILM COATED ORAL at 17:51

## 2018-12-16 RX ADMIN — OXYCODONE HYDROCHLORIDE AND ACETAMINOPHEN 500 MG: 500 TABLET ORAL at 04:49

## 2018-12-16 RX ADMIN — SODIUM CHLORIDE 500 ML: 9 INJECTION, SOLUTION INTRAVENOUS at 22:35

## 2018-12-16 RX ADMIN — LIDOCAINE 1 PATCH: 50 PATCH TOPICAL at 15:01

## 2018-12-16 RX ADMIN — TRAZODONE HYDROCHLORIDE 50 MG: 50 TABLET ORAL at 20:18

## 2018-12-16 RX ADMIN — OMEPRAZOLE 20 MG: 20 CAPSULE, DELAYED RELEASE ORAL at 04:49

## 2018-12-16 RX ADMIN — SODIUM CHLORIDE 100 MG: 9 INJECTION, SOLUTION INTRAVENOUS at 17:51

## 2018-12-16 RX ADMIN — SUCRALFATE 1 G: 1 TABLET ORAL at 00:23

## 2018-12-16 RX ADMIN — SEVELAMER CARBONATE 2400 MG: 800 TABLET, FILM COATED ORAL at 11:31

## 2018-12-16 RX ADMIN — OXYCODONE HYDROCHLORIDE 5 MG: 5 TABLET ORAL at 00:39

## 2018-12-16 RX ADMIN — HEPARIN SODIUM 5000 UNITS: 5000 INJECTION, SOLUTION INTRAVENOUS; SUBCUTANEOUS at 15:00

## 2018-12-16 RX ADMIN — SUCRALFATE 1 G: 1 TABLET ORAL at 04:49

## 2018-12-16 RX ADMIN — SODIUM CHLORIDE 1000 MG: 9 INJECTION, SOLUTION INTRAVENOUS at 20:18

## 2018-12-16 RX ADMIN — CETIRIZINE HYDROCHLORIDE 10 MG: 10 TABLET, FILM COATED ORAL at 11:31

## 2018-12-16 RX ADMIN — SUCRALFATE 1 G: 1 TABLET ORAL at 17:51

## 2018-12-16 RX ADMIN — STANDARDIZED SENNA CONCENTRATE AND DOCUSATE SODIUM 2 TABLET: 8.6; 5 TABLET, FILM COATED ORAL at 17:51

## 2018-12-16 RX ADMIN — HEPARIN SODIUM 5000 UNITS: 5000 INJECTION, SOLUTION INTRAVENOUS; SUBCUTANEOUS at 22:34

## 2018-12-16 RX ADMIN — HEPARIN SODIUM 5000 UNITS: 5000 INJECTION, SOLUTION INTRAVENOUS; SUBCUTANEOUS at 04:57

## 2018-12-16 RX ADMIN — FERROUS SULFATE TAB 325 MG (65 MG ELEMENTAL FE) 325 MG: 325 (65 FE) TAB at 08:41

## 2018-12-16 RX ADMIN — HYDROXYCHLOROQUINE SULFATE 200 MG: 200 TABLET, FILM COATED ORAL at 20:18

## 2018-12-16 RX ADMIN — OXYCODONE HYDROCHLORIDE 10 MG: 10 TABLET ORAL at 15:00

## 2018-12-16 RX ADMIN — OXYCODONE HYDROCHLORIDE 5 MG: 5 TABLET ORAL at 08:41

## 2018-12-16 RX ADMIN — SODIUM CHLORIDE 100 MG: 9 INJECTION, SOLUTION INTRAVENOUS at 04:49

## 2018-12-16 RX ADMIN — SEVELAMER CARBONATE 2400 MG: 800 TABLET, FILM COATED ORAL at 08:41

## 2018-12-16 RX ADMIN — TIZANIDINE 8 MG: 4 TABLET ORAL at 19:17

## 2018-12-16 RX ADMIN — VITAMIN D, TAB 1000IU (100/BT) 1000 UNITS: 25 TAB at 04:58

## 2018-12-16 RX ADMIN — PREGABALIN 100 MG: 100 CAPSULE ORAL at 20:18

## 2018-12-16 ASSESSMENT — ENCOUNTER SYMPTOMS
DIARRHEA: 0
COUGH: 0
NECK PAIN: 0
PALPITATIONS: 0
MEMORY LOSS: 0
FLANK PAIN: 0
VOMITING: 0
HEADACHES: 0
WHEEZING: 0
EYE REDNESS: 0
FEVER: 0
BACK PAIN: 0
SEIZURES: 0
EYE DISCHARGE: 0
NAUSEA: 0
SINUS PAIN: 0
SPUTUM PRODUCTION: 0
FOCAL WEAKNESS: 0
TINGLING: 0
CHILLS: 0
NERVOUS/ANXIOUS: 0
SHORTNESS OF BREATH: 0
SPEECH CHANGE: 0
LOSS OF CONSCIOUSNESS: 0

## 2018-12-16 ASSESSMENT — PAIN SCALES - GENERAL
PAINLEVEL_OUTOF10: 7
PAINLEVEL_OUTOF10: 4
PAINLEVEL_OUTOF10: 4
PAINLEVEL_OUTOF10: 7
PAINLEVEL_OUTOF10: 8
PAINLEVEL_OUTOF10: 5
PAINLEVEL_OUTOF10: 4
PAINLEVEL_OUTOF10: 6
PAINLEVEL_OUTOF10: 0
PAINLEVEL_OUTOF10: 0
PAINLEVEL_OUTOF10: 2
PAINLEVEL_OUTOF10: 5

## 2018-12-16 ASSESSMENT — LIFESTYLE VARIABLES: SUBSTANCE_ABUSE: 0

## 2018-12-16 NOTE — PROGRESS NOTES
[facility] Medicine Daily Progress Note    Date of Service  12/16/2018    Chief Complaint  38 y.o. female history of end-stage renal disease on dialysis, C. difficile colitis, ESBL, fibromyalgia, hypertension, lupus, pyelonephritis admitted 12/4/2018 with complaints of abdominal pain, constipation, missed hemodialysis, uremia, hyperkalemia    Interval Problem Update      No ON events  AOx4  No seizures  SB-SR 50-90  SBP   Anuric    MRI with no acute pathology and stable since 2012            Consultants/Specialty  Nephrology  Neurology  Code Status  Full code    Disposition  Likely home    Review of Systems  Review of Systems   Unable to perform ROS: Mental acuity   Constitutional: Negative for chills and fever.   HENT: Negative for congestion and sinus pain.    Eyes: Negative for discharge and redness.   Respiratory: Negative for cough, sputum production, shortness of breath and wheezing.    Cardiovascular: Negative for chest pain, palpitations and leg swelling.   Gastrointestinal: Negative for diarrhea, nausea and vomiting.   Genitourinary: Negative for flank pain and hematuria.   Musculoskeletal: Positive for joint pain. Negative for back pain and neck pain.   Skin: Positive for itching.   Neurological: Negative for tingling, speech change, focal weakness, seizures, loss of consciousness and headaches.   Psychiatric/Behavioral: Negative for memory loss and substance abuse. The patient is not nervous/anxious.    All other systems reviewed and are negative.  No change in review of system    Physical Exam  Temp:  [35.9 °C (96.7 °F)-36.4 °C (97.5 °F)] 36.4 °C (97.5 °F)  Pulse:  [] 73  Resp:  [13-27] 14    Physical Exam   Constitutional: She is oriented to person, place, and time. She appears well-developed and well-nourished.   HENT:   Head: Normocephalic and atraumatic.   Right Ear: External ear normal.   Left Ear: External ear normal.   Mouth/Throat: No oropharyngeal exudate.   On continuous EEG   Eyes:  Pupils are equal, round, and reactive to light. Right eye exhibits no discharge. Left eye exhibits no discharge.   Neck: Neck supple. No JVD present. No tracheal deviation present.   Cardiovascular: Normal rate, regular rhythm and normal heart sounds.    Pulmonary/Chest: Effort normal and breath sounds normal. No stridor. No respiratory distress. She has no wheezes. She has no rales. She exhibits no tenderness.   Abdominal: Soft. Bowel sounds are normal. She exhibits no distension. There is no tenderness. There is no rebound and no guarding.   Musculoskeletal: She exhibits edema. She exhibits no tenderness.   Neurological: She is alert and oriented to person, place, and time. No cranial nerve deficit. She exhibits normal muscle tone.   Lethargic   follows command     Skin: Skin is warm and dry. She is not diaphoretic. No cyanosis. Nails show no clubbing.   Psychiatric: She has a normal mood and affect. Her speech is normal and behavior is normal.   Nursing note and vitals reviewed.  No significant change in exam    Fluids    Intake/Output Summary (Last 24 hours) at 12/16/18 0923  Last data filed at 12/16/18 0800   Gross per 24 hour   Intake             1040 ml   Output                0 ml   Net             1040 ml       Laboratory  Recent Labs      12/14/18   0500  12/15/18   0329  12/16/18   0505   WBC  8.4  8.7  5.4   RBC  3.48*  3.87*  3.22*   HEMOGLOBIN  10.2*  11.7*  9.9*   HEMATOCRIT  32.1*  35.7*  30.2*   MCV  92.2  92.2  93.8   MCH  29.3  30.2  30.7   MCHC  31.8*  32.8*  32.8*   RDW  45.7  46.2  45.6   PLATELETCT  137*  142*  138*   MPV  11.4  11.4  11.6     Recent Labs      12/14/18   0500  12/15/18   0329  12/16/18   0505   SODIUM  139  138  136   POTASSIUM  4.5  4.4  4.4   CHLORIDE  99  96  96   CO2  23  25  23   GLUCOSE  89  87  83   BUN  55*  36*  55*   CREATININE  9.97*  7.05*  9.44*   CALCIUM  9.5  10.1  9.9                   Imaging  MR-BRAIN-W/O   Final Result      1.  There is no hippocampal  sclerosis.   2.  There is no evidence of cortical dysplasia or neuronal migrational abnormalities.   3.  A few nonspecific T2 hyperintensities in the supratentorial white matter likely representing nonspecific foci of gliosis, chronic ischemia and demyelination. This is unchanged since the previous MRI dated 2/23/2012.      DX-ABDOMEN FOR TUBE PLACEMENT   Final Result      Feeding tube is looped upon itself, with a loop extending into the proximal duodenum, however tip remains at the gastric fundus.      DX-ABDOMEN FOR TUBE PLACEMENT   Final Result      Feeding tube looped upon itself with tip at the gastric fundus.      CT-HEAD W/O   Final Result      Mild cerebral cortical atrophy. No mass effect or acute hemorrhage.      CT-ABDOMEN-PELVIS W/O   Final Result         Interstitial prominence in lung bases with pleural effusions consistent with pulmonary edema.      Anasarca and trace ascites.      No evidence of acute abdominal or pelvic pathology.      DX-CHEST-PORTABLE (1 VIEW)   Final Result      Enlarged cardiac silhouette with interstitial prominence consistent with pulmonary edema with probable left pleural fluid.           Assessment/Plan  * Status epilepticus (HCC)- (present on admission)   Assessment & Plan    MRI with no acute pathology  Continue Vimpat and Keppra  She will need to follow-up with neurology as outpatient         ESRD (end stage renal disease) on dialysis (HCC)- (present on admission)   Assessment & Plan    For hemodialysis tomorrow  Nephrology following     Lupus (systemic lupus erythematosus) (HCC)- (present on admission)   Assessment & Plan    Continue Plaquenil     Avascular necrosis (HCC)- (present on admission)   Assessment & Plan    Report bilateral hips.  Chronic.  Follow with orthopedics as outpatient         Hypertension- (present on admission)   Assessment & Plan    Controlled on current meds monitor       Plan of care reviewed with patient and her questions answered  If remains  stable plan to discharge home tomorrow after hemodialysis    VTE prophylaxis: Heparin

## 2018-12-16 NOTE — CARE PLAN
Problem: Skin Integrity  Goal: Risk for impaired skin integrity will decrease    Intervention: Implement precautions to protect skin integrity in collaboration with the interdisciplinary team  Pillows in use for support positioning, waffle cushion in place       Problem: Nutrition Deficit  Goal: Adequate Food and Fluid Intake    Intervention: Monitor Dietary Intake  Intake monitored after each meal   Intervention: Provide Oral Care  Teeth brushed

## 2018-12-16 NOTE — CARE PLAN
Problem: Skin Integrity  Goal: Risk for impaired skin integrity will decrease  Daily skin assessment complete. Continued 2 hour turns. Keep skin clean and dry.    Problem: Nutrition Deficit  Goal: Adequate Food and Fluid Intake  Patient started to advance meals today. Intake about 50-75%

## 2018-12-16 NOTE — PROGRESS NOTES
12 hour chart check  SR-ST: 's  MS:0.14 / 0.08 / 0.34 SBP: 's    · 2 RN skin checkcomplete.   · Devices in place SCD's, BP cuff, continuous pulse ox and cardiac monitoring  · Skin assessed under devices. No sings of new breakdown  · Partial thickness sore on L buttock stable from previous exam  The following interventions in place waffle cushion and pillows for support, frequent repositioning

## 2018-12-17 VITALS
RESPIRATION RATE: 16 BRPM | HEIGHT: 65 IN | DIASTOLIC BLOOD PRESSURE: 39 MMHG | WEIGHT: 169.75 LBS | SYSTOLIC BLOOD PRESSURE: 97 MMHG | HEART RATE: 62 BPM | OXYGEN SATURATION: 92 % | TEMPERATURE: 98.8 F | BODY MASS INDEX: 28.28 KG/M2

## 2018-12-17 PROCEDURE — 700102 HCHG RX REV CODE 250 W/ 637 OVERRIDE(OP): Performed by: INTERNAL MEDICINE

## 2018-12-17 PROCEDURE — 99239 HOSP IP/OBS DSCHRG MGMT >30: CPT | Performed by: HOSPITALIST

## 2018-12-17 PROCEDURE — 92526 ORAL FUNCTION THERAPY: CPT

## 2018-12-17 PROCEDURE — 700111 HCHG RX REV CODE 636 W/ 250 OVERRIDE (IP): Performed by: INTERNAL MEDICINE

## 2018-12-17 PROCEDURE — C9254 INJECTION, LACOSAMIDE: HCPCS | Performed by: PSYCHIATRY & NEUROLOGY

## 2018-12-17 PROCEDURE — 90935 HEMODIALYSIS ONE EVALUATION: CPT

## 2018-12-17 PROCEDURE — G8997 SWALLOW GOAL STATUS: HCPCS | Mod: CJ

## 2018-12-17 PROCEDURE — 700105 HCHG RX REV CODE 258: Performed by: PSYCHIATRY & NEUROLOGY

## 2018-12-17 PROCEDURE — 700102 HCHG RX REV CODE 250 W/ 637 OVERRIDE(OP): Performed by: HOSPITALIST

## 2018-12-17 PROCEDURE — G8996 SWALLOW CURRENT STATUS: HCPCS | Mod: CL

## 2018-12-17 PROCEDURE — A9270 NON-COVERED ITEM OR SERVICE: HCPCS | Performed by: INTERNAL MEDICINE

## 2018-12-17 PROCEDURE — 700111 HCHG RX REV CODE 636 W/ 250 OVERRIDE (IP): Performed by: PSYCHIATRY & NEUROLOGY

## 2018-12-17 PROCEDURE — A9270 NON-COVERED ITEM OR SERVICE: HCPCS | Performed by: HOSPITALIST

## 2018-12-17 PROCEDURE — A9270 NON-COVERED ITEM OR SERVICE: HCPCS | Performed by: FAMILY MEDICINE

## 2018-12-17 PROCEDURE — G8998 SWALLOW D/C STATUS: HCPCS | Mod: CH

## 2018-12-17 PROCEDURE — 90935 HEMODIALYSIS ONE EVALUATION: CPT | Performed by: INTERNAL MEDICINE

## 2018-12-17 PROCEDURE — 700111 HCHG RX REV CODE 636 W/ 250 OVERRIDE (IP): Performed by: HOSPITALIST

## 2018-12-17 PROCEDURE — 700102 HCHG RX REV CODE 250 W/ 637 OVERRIDE(OP): Performed by: FAMILY MEDICINE

## 2018-12-17 RX ORDER — LACOSAMIDE 100 MG/1
100 TABLET ORAL 2 TIMES DAILY
Qty: 60 TAB | Refills: 0 | Status: SHIPPED | OUTPATIENT
Start: 2018-12-17 | End: 2018-12-17

## 2018-12-17 RX ORDER — LEVETIRACETAM 1000 MG/1
1000 TABLET ORAL DAILY
Qty: 30 TAB | Refills: 0 | Status: ON HOLD | OUTPATIENT
Start: 2018-12-17 | End: 2019-01-09

## 2018-12-17 RX ORDER — HEPARIN SODIUM 1000 [USP'U]/ML
1500 INJECTION, SOLUTION INTRAVENOUS; SUBCUTANEOUS
Status: DISCONTINUED | OUTPATIENT
Start: 2018-12-17 | End: 2018-12-17 | Stop reason: HOSPADM

## 2018-12-17 RX ORDER — LACOSAMIDE 100 MG/1
100 TABLET ORAL 2 TIMES DAILY
Qty: 60 TAB | Refills: 0 | Status: SHIPPED | OUTPATIENT
Start: 2018-12-17 | End: 2019-01-16

## 2018-12-17 RX ORDER — AMLODIPINE BESYLATE 5 MG/1
5 TABLET ORAL DAILY
Qty: 30 TAB | Refills: 0 | Status: SHIPPED | OUTPATIENT
Start: 2018-12-18 | End: 2019-01-01 | Stop reason: CLARIF

## 2018-12-17 RX ADMIN — PREGABALIN 100 MG: 100 CAPSULE ORAL at 05:34

## 2018-12-17 RX ADMIN — HEPARIN SODIUM 1100 UNITS: 1000 INJECTION, SOLUTION INTRAVENOUS; SUBCUTANEOUS at 10:33

## 2018-12-17 RX ADMIN — OXYCODONE HYDROCHLORIDE AND ACETAMINOPHEN 500 MG: 500 TABLET ORAL at 05:34

## 2018-12-17 RX ADMIN — CETIRIZINE HYDROCHLORIDE 10 MG: 10 TABLET, FILM COATED ORAL at 10:08

## 2018-12-17 RX ADMIN — SODIUM CHLORIDE 100 MG: 9 INJECTION, SOLUTION INTRAVENOUS at 05:40

## 2018-12-17 RX ADMIN — HEPARIN SODIUM 5000 UNITS: 5000 INJECTION, SOLUTION INTRAVENOUS; SUBCUTANEOUS at 05:34

## 2018-12-17 RX ADMIN — OXYCODONE HYDROCHLORIDE 10 MG: 10 TABLET ORAL at 08:01

## 2018-12-17 RX ADMIN — SUCRALFATE 1 G: 1 TABLET ORAL at 12:10

## 2018-12-17 RX ADMIN — SEVELAMER CARBONATE 2400 MG: 800 TABLET, FILM COATED ORAL at 08:59

## 2018-12-17 RX ADMIN — HEPARIN 500 UNITS: 100 SYRINGE at 13:51

## 2018-12-17 RX ADMIN — OMEPRAZOLE 20 MG: 20 CAPSULE, DELAYED RELEASE ORAL at 05:34

## 2018-12-17 RX ADMIN — HEPARIN SODIUM 5000 UNITS: 5000 INJECTION, SOLUTION INTRAVENOUS; SUBCUTANEOUS at 13:57

## 2018-12-17 RX ADMIN — PREGABALIN 100 MG: 100 CAPSULE ORAL at 12:11

## 2018-12-17 RX ADMIN — STANDARDIZED SENNA CONCENTRATE AND DOCUSATE SODIUM 2 TABLET: 8.6; 5 TABLET, FILM COATED ORAL at 05:34

## 2018-12-17 RX ADMIN — SUCRALFATE 1 G: 1 TABLET ORAL at 05:34

## 2018-12-17 RX ADMIN — SEVELAMER CARBONATE 2400 MG: 800 TABLET, FILM COATED ORAL at 13:48

## 2018-12-17 RX ADMIN — FERROUS SULFATE TAB 325 MG (65 MG ELEMENTAL FE) 325 MG: 325 (65 FE) TAB at 07:53

## 2018-12-17 RX ADMIN — VITAMIN D, TAB 1000IU (100/BT) 1000 UNITS: 25 TAB at 05:34

## 2018-12-17 ASSESSMENT — PAIN SCALES - GENERAL
PAINLEVEL_OUTOF10: 6
PAINLEVEL_OUTOF10: 0
PAINLEVEL_OUTOF10: 8
PAINLEVEL_OUTOF10: 4

## 2018-12-17 NOTE — CARE PLAN
Problem: Skin Integrity  Goal: Skin Integrity is maintained or improved    Intervention: Measure and Document any Skin Breakdown  Full skin assessment complete and wounds documented in flow sheets.      Problem: Risk of Aspiration  Goal: Absence of aspiration    Intervention: Swallowing Assessment  Swallowing assessment completed by Speech Therapy.

## 2018-12-17 NOTE — PROGRESS NOTES
Nephrology Daily Progress Note    Date of Service  12/17/2018    Chief Complaint  38 y.o. female admitted 12/4/2018 with ESRD, anemia  Found to have seizures    Interval Problem Update  Seen on dialysis today.  Tolerating treatment    Review of Systems  Review of Systems   All other systems reviewed and are negative.       Physical Exam  Temp:  [36.6 °C (97.8 °F)-37.1 °C (98.8 °F)] 37.1 °C (98.8 °F)  Pulse:  [60-79] 62  Resp:  [14-18] 16  BP: ()/(39-54) 97/39    Physical Exam   Constitutional: She appears well-developed.   Cardiovascular: Normal rate and regular rhythm.    Pulmonary/Chest: Effort normal and breath sounds normal.   Musculoskeletal: She exhibits no edema or tenderness.   Skin: She is not diaphoretic.       Fluids    Intake/Output Summary (Last 24 hours) at 12/17/18 1337  Last data filed at 12/17/18 1239   Gross per 24 hour   Intake             1298 ml   Output                0 ml   Net             1298 ml       Laboratory  Recent Labs      12/15/18   0329  12/16/18   0505   WBC  8.7  5.4   RBC  3.87*  3.22*   HEMOGLOBIN  11.7*  9.9*   HEMATOCRIT  35.7*  30.2*   MCV  92.2  93.8   MCH  30.2  30.7   MCHC  32.8*  32.8*   RDW  46.2  45.6   PLATELETCT  142*  138*   MPV  11.4  11.6     Recent Labs      12/15/18   0329  12/16/18   0505   SODIUM  138  136   POTASSIUM  4.4  4.4   CHLORIDE  96  96   CO2  25  23   GLUCOSE  87  83   BUN  36*  55*   CREATININE  7.05*  9.44*   CALCIUM  10.1  9.9                   Imaging  MR-BRAIN-W/O   Final Result      1.  There is no hippocampal sclerosis.   2.  There is no evidence of cortical dysplasia or neuronal migrational abnormalities.   3.  A few nonspecific T2 hyperintensities in the supratentorial white matter likely representing nonspecific foci of gliosis, chronic ischemia and demyelination. This is unchanged since the previous MRI dated 2/23/2012.      DX-ABDOMEN FOR TUBE PLACEMENT   Final Result      Feeding tube is looped upon itself, with a loop extending  into the proximal duodenum, however tip remains at the gastric fundus.      DX-ABDOMEN FOR TUBE PLACEMENT   Final Result      Feeding tube looped upon itself with tip at the gastric fundus.      CT-HEAD W/O   Final Result      Mild cerebral cortical atrophy. No mass effect or acute hemorrhage.      CT-ABDOMEN-PELVIS W/O   Final Result         Interstitial prominence in lung bases with pleural effusions consistent with pulmonary edema.      Anasarca and trace ascites.      No evidence of acute abdominal or pelvic pathology.      DX-CHEST-PORTABLE (1 VIEW)   Final Result      Enlarged cardiac silhouette with interstitial prominence consistent with pulmonary edema with probable left pleural fluid.           Assessment/Plan    1. ESRD -continue dialysis on a Monday Wednesday Friday schedule.  2. Volume overload -UF as tolerated  3.  Anemia of chronic kidney disease, no Epogen    -Continue Monday Wednesday Friday dialysis

## 2018-12-17 NOTE — PROGRESS NOTES
· 2 RN skin check complete.   · Devices in place: BP cuff, pulse ox, SCDs  · Skin assessed under devices.  Generalized scarring

## 2018-12-17 NOTE — THERAPY
"Speech Language Therapy dysphagia treatment completed.   Functional Status:  Pt seen this date for dysphagia tx with breakfast tray. Patient awake, alert, followed directions and engaged throughout session. Pt consumed regular diet and thin liquids by cup/straw with no s/sx of aspiration. Strength and ROM of lingual, labial and jaw structures appreciated to be WFL. Vocal quality remained clear yet gravely throughout session. Pt reports voice at baseline.  Recommendations:  Therapy goals met, no further SLP services warranted at this time, SLP to discharge.      Plan of Care: Patient with no further skilled SLP needs in the acute care setting at this time  Post-Acute Therapy: Anticipate that the patient will have no further speech therapy needs after discharge from the hospital.      See \"Rehab Therapy-Acute\" Patient Summary Report for complete documentation.     "

## 2018-12-17 NOTE — DISCHARGE INSTRUCTIONS
Discharge Instructions    Discharged to home by car with friend. Discharged via wheelchair, hospital escort: Yes.  Special equipment needed: Not Applicable    Be sure to schedule a follow-up appointment with your primary care doctor or any specialists as instructed.     Discharge Plan: Discussed.  Diet Plan: Discussed  Activity Level: Discussed  Confirmed Follow up Appointment: Discussed.  Confirmed Symptoms Management: Discussed  Medication Reconciliation Updated: Yes  Pneumococcal Vaccine Administered/Refused: Not given - Patient refused pneumococcal vaccine  Influenza Vaccine Indication: Patient Refuses    I understand that a diet low in cholesterol, fat, and sodium is recommended for good health. Unless I have been given specific instructions below for another diet, I accept this instruction as my diet prescription.   Other diet: renal     Dialysis Diet  Dialysis is a treatment that you may undergo if you have significant damage to your kidneys. Dialysis replaces some of the work that the kidneys do. One of the jobs that it takes over is removing wastes, salt, and extra water from your blood. This helps to keep the amount of potassium and other nutrients in your blood at healthy levels.  When you need dialysis, it is important to pay careful attention to your diet. Between dialysis sessions, certain nutrients can build up in your blood and cause you to get sick. Vitamins and minerals are an important part of a healthy diet and should not be avoided entirely. However, it is commonly recommended that you limit your intake of potassium, phosphorus, and sodium. It may also be necessary to restrict other nutrients, such as carbohydrates or fat, if you have other health conditions. Your health care provider or dietitian can help you to determine the amount of these nutrients that is right for you.  WHAT IS MY PLAN?  Your dietitian will help you to design a meal plan that is specific to your needs. Generally, meal  plans include:  · Grains, 6-11 servings per day. One serving is equal to 1 slice of bread or ½ cup of cooked rice or pasta.  · Low-potassium vegetables, 2-3 servings per day. One serving is equal to ½ cup.  · Low-potassium fruits, 2-3 servings per day. One serving is equal to ½ cup.  · Meats and other protein sources, 8-11 oz per day.  · Dairy, ½ cup per day.  Your dietitian will provide you with specific instructions about the amount of fluids you can have each day.  WHAT DO I NEED TO KNOW ABOUT A DIALYSIS DIET?  · Limit your intake of potassium. Potassium is found in milk, fruits, and vegetables.  · Limit your intake of phosphorus. Phosphorus is found in milk, cheese, beans, nuts, and carbonated beverages. Avoid whole-grain and high-fiber foods because they contain high amounts of phosphorus.  · Limit your intake of sodium. Foods that are high in sodium include processed and cured meats, ready-made frozen meals, canned vegetables, and salty snack foods. Do not use salt substitutes because they contain potassium.  · If you were instructed to restrict your fluid intake, follow your health care provider's specific instructions. You may be told to:  ¨ Write down what you drink and any foods you eat that are made mostly from water, such as gelatin and soups.  ¨ Drink from small cups to help control how much you drink.  · Ask your health care provider if you should regularly take an over-the-counter medicine that binds phosphorus, such as antacid products that contain calcium carbonate.  · Take vitamin and mineral supplements only as directed by your health care provider.  · Eat high-quality proteins, such as meat, poultry, fish, and eggs. Limit low-quality plant-based proteins, such as nuts and beans.  · Cut potatoes into small pieces and boil them in unsalted water before you eat them. This can help to remove some potassium from the potato.  · Drain all fluid from cooked vegetables and canned fruits before eating  them.  WHAT FOODS CAN I EAT?  Grains  White bread. White rice. Cooked cereal. Unsalted popcorn. Tortillas. Pasta.  Vegetables  Fresh or frozen broccoli, carrots, and green beans. Cabbage. Cauliflower. Celery. Cucumbers. Eggplant. Radishes. Zucchini.  Fruits  Apples. Fresh or frozen berries. Fresh or canned pears, peaches, and pineapple. Grapes. Plums.  Meats and Other Protein Sources  Fresh or frozen beef, pork, chicken, and fish. Eggs. Low-sodium canned tuna or salmon.  Dairy  Cream cheese. Heavy cream. Ricotta cheese.  Beverages  Apple cider. Cranberry juice. Grape juice. Lemonade. Black coffee.  Condiments  Herbs. Spices. Jam and jelly. Honey.  Sweets and Desserts  Sherbet. Cakes. Cookies.  Fats and Oils  Olive oil, canola oil, and safflower oil.  Other  Non-dairy creamer. Non-dairy whipped topping. Homemade broth without salt.  The items listed above may not be a complete list of recommended foods or beverages. Contact your dietitian for more options.   WHAT FOODS ARE NOT RECOMMENDED?  Grains  Whole-grain bread. Whole-grain pasta. High-fiber cereal.  Vegetables  Potatoes. Beets. Tomatoes. Winter squash and pumpkin. Asparagus. Spinach. Parsnips.  Fruits  Star fruit. Bananas. Oranges. Kiwi. Nectarines. Prunes. Melon. Dried fruit. Avocado.  Meats and Other Protein Sources  Canned, smoked, and cured meats. Packaged luncheon meat. Sardines. Nuts and seeds. Peanut butter. Beans and legumes.  Dairy  Milk. Buttermilk. Yogurt. Cheese and cottage cheese. Processed cheese spreads.  Beverages  Orange juice. Prune juice. Carbonated soft drinks.  Condiments  Salt. Salt substitutes. Soy sauce.  Sweets and Desserts  Ice cream. Chocolate. Candied nuts.  Fats and Oils  Butter. Margarine.  Other  Ready-made frozen meals. Canned soups.  The items listed above may not be a complete list of foods and beverages to avoid. Contact your dietitian for more information.     This information is not intended to replace advice given to you by  your health care provider. Make sure you discuss any questions you have with your health care provider.     Document Released: 09/14/2005 Document Revised: 01/08/2016 Document Reviewed: 07/21/2015  [employer] Interactive Patient Education ©2016 [employer]      Special Instructions: None    · Is patient discharged on Warfarin / Coumadin?   No       Abdominal Pain, Adult  Many things can cause belly (abdominal) pain. Most times, belly pain is not dangerous. Many cases of belly pain can be watched and treated at home. Sometimes belly pain is serious, though. Your doctor will try to find the cause of your belly pain.  Follow these instructions at home:  · Take over-the-counter and prescription medicines only as told by your doctor. Do not take medicines that help you poop (laxatives) unless told to by your doctor.  · Drink enough fluid to keep your pee (urine) clear or pale yellow.  · Watch your belly pain for any changes.  · Keep all follow-up visits as told by your doctor. This is important.  Contact a doctor if:  · Your belly pain changes or gets worse.  · You are not hungry, or you lose weight without trying.  · You are having trouble pooping (constipated) or have watery poop (diarrhea) for more than 2-3 days.  · You have pain when you pee or poop.  · Your belly pain wakes you up at night.  · Your pain gets worse with meals, after eating, or with certain foods.  · You are throwing up and cannot keep anything down.  · You have a fever.  Get help right away if:  · Your pain does not go away as soon as your doctor says it should.  · You cannot stop throwing up.  · Your pain is only in areas of your belly, such as the right side or the left lower part of the belly.  · You have bloody or black poop, or poop that looks like tar.  · You have very bad pain, cramping, or bloating in your belly.  · You have signs of not having enough fluid or water in your body (dehydration), such as:  ¨ Dark pee, very little pee, or no  pee.  ¨ Cracked lips.  ¨ Dry mouth.  ¨ Sunken eyes.  ¨ Sleepiness.  ¨ Weakness.  This information is not intended to replace advice given to you by your health care provider. Make sure you discuss any questions you have with your health care provider.  Document Released: 06/05/2009 Document Revised: 07/07/2017 Document Reviewed: 05/31/2017  [employer] Interactive Patient Education © 2017 [employer]      Constipation, Adult  Constipation is when a person:  · Poops (has a bowel movement) fewer times in a week than normal.  · Has a hard time pooping.  · Has poop that is dry, hard, or bigger than normal.  Follow these instructions at home:  Eating and drinking  · Eat foods that have a lot of fiber, such as:  ¨ Fresh fruits and vegetables.  ¨ Whole grains.  ¨ Beans.  · Eat less of foods that are high in fat, low in fiber, or overly processed, such as:  ¨ French fries.  ¨ Hamburgers.  ¨ Cookies.  ¨ Candy.  ¨ Soda.  · Drink enough fluid to keep your pee (urine) clear or pale yellow.  General instructions  · Exercise regularly or as told by your doctor.  · Go to the restroom when you feel like you need to poop. Do not hold it in.  · Take over-the-counter and prescription medicines only as told by your doctor. These include any fiber supplements.  · Do pelvic floor retraining exercises, such as:  ¨ Doing deep breathing while relaxing your lower belly (abdomen).  ¨ Relaxing your pelvic floor while pooping.  · Watch your condition for any changes.  · Keep all follow-up visits as told by your doctor. This is important.  Contact a doctor if:  · You have pain that gets worse.  · You have a fever.  · You have not pooped for 4 days.  · You throw up (vomit).  · You are not hungry.  · You lose weight.  · You are bleeding from the anus.  · You have thin, pencil-like poop (stool).  Get help right away if:  · You have a fever, and your symptoms suddenly get worse.  · You leak poop or have blood in your poop.  · Your belly feels hard or  bigger than normal (is bloated).  · You have very bad belly pain.  · You feel dizzy or you faint.  This information is not intended to replace advice given to you by your health care provider. Make sure you discuss any questions you have with your health care provider.  Document Released: 06/05/2009 Document Revised: 07/07/2017 Document Reviewed: 06/07/2017  [employer] Interactive Patient Education © 2017 [employer]      Hyperkalemia  Introduction  Hyperkalemia is when you have too much potassium in your blood. Potassium is normally removed (excreted) from your body by your kidneys. If there is too much potassium in your blood, it can affect how your heart works.  Follow these instructions at home:  · Take medicines only as told by your doctor.  · Do not take any supplements, natural products, herbs, or vitamins unless your doctor says it is okay.  · Limit your alcohol intake as told by your doctor.  · Stop illegal drug use. If you need help quitting, ask your doctor.  · Keep all follow-up visits as told by your doctor. This is important.  · If you have kidney disease, you may need to follow a low potassium diet. A  (dietitian) can help you.  Contact a doctor if:  · Your heartbeat is not regular or very slow.  · You feel dizzy (light-headed).  · You feel weak.  · You feel sick to your stomach (nauseous).  · You have tingling in your hands or feet.  · You cannot feel your hands or feet.  Get help right away if:  · You are short of breath.  · You have chest pain.  · You pass out (faint).  · You cannot move your muscles.  This information is not intended to replace advice given to you by your health care provider. Make sure you discuss any questions you have with your health care provider.  Document Released: 12/18/2006 Document Revised: 05/25/2017 Document Reviewed: 03/25/2015  © 2017 Elsevier      Seizure, Adult  When you have a seizure:  · Parts of your body may move.  · How aware or awake  (conscious) you are may change.  · You may shake (convulse).  Some people have symptoms right before a seizure happens. These symptoms may include:  · Fear.  · Worry (anxiety).  · Feeling like you are going to throw up (nausea).  · Feeling like the room is spinning (vertigo).  · Feeling like you saw or heard something before (elizabet vu).  · Odd tastes or smells.  · Changes in vision, such as seeing flashing lights or spots.  Seizures usually last from 30 seconds to 2 minutes. Usually, they are not harmful unless they last a long time.  Follow these instructions at home:  Medicines  · Take over-the-counter and prescription medicines only as told by your doctor.  · Avoid anything that may keep your medicine from working, such as alcohol.  Activity  · Do not do any activities that would be dangerous if you had another seizure, like driving or swimming. Wait until your doctor approves.  · If you live in the [place] ask your local DMV (department of [place]) when you can drive.  · Rest.  Teaching others  · Teach friends and family what to do when you have a seizure. They should:  ¨ Lay you on the ground.  ¨ Protect your head and body.  ¨ Loosen any tight clothing around your neck.  ¨ Turn you on your side.  ¨ Stay with you until you are better.  ¨ Not hold you down.  ¨ Not put anything in your mouth.  ¨ Know whether or not you need emergency care.  General instructions  · Contact your doctor each time you have a seizure.  · Avoid anything that gives you seizures.  · Keep a seizure diary. Write down:  ¨ What you think caused each seizure.  ¨ What you remember about each seizure.  · Keep all follow-up visits as told by your doctor. This is important.  Contact a doctor if:  · You have another seizure.  · You have seizures more often.  · There is any change in what happens during your seizures.  · You continue to have seizures with treatment.  · You have symptoms of being sick or having an infection.  Get help right away  if:  · You have a seizure:  ¨ That lasts longer than 5 minutes.  ¨ That is different than seizures you had before.  ¨ That makes it harder to breathe.  ¨ After you hurt your head.  · After a seizure, you cannot speak or use a part of your body.  · After a seizure, you are confused or have a bad headache.  · You have two or more seizures in a row.  · You are having seizures more often.  · You do not wake up right after a seizure.  · You get hurt during a seizure.  In an emergency:  · These symptoms may be an emergency. Do not wait to see if the symptoms will go away. Get medical help right away. Call your local emergency services (911 in the [place] Do not drive yourself to the hospital.  This information is not intended to replace advice given to you by your health care provider. Make sure you discuss any questions you have with your health care provider.  Document Released: 06/05/2009 Document Revised: 08/30/2017 Document Reviewed: 08/30/2017  [employer] Interactive Patient Education © 2017 [employer] Inc.      Depression / Suicide Risk    As you are discharged from this Critical access hospital facility, it is important to learn how to keep safe from harming yourself.    Recognize the warning signs:  · Abrupt changes in personality, positive or negative- including increase in energy   · Giving away possessions  · Change in eating patterns- significant weight changes-  positive or negative  · Change in sleeping patterns- unable to sleep or sleeping all the time   · Unwillingness or inability to communicate  · Depression  · Unusual sadness, discouragement and loneliness  · Talk of wanting to die  · Neglect of personal appearance   · Rebelliousness- reckless behavior  · Withdrawal from people/activities they love  · Confusion- inability to concentrate     If you or a loved one observes any of these behaviors or has concerns about self-harm, here's what you can do:  · Talk about it- your feelings and reasons for harming  yourself  · Remove any means that you might use to hurt yourself (examples: pills, rope, extension cords, firearm)  · Get professional help from the community (Mental Health, Substance Abuse, psychological counseling)  · Do not be alone:Call your Safe Contact- someone whom you trust who will be there for you.  · Call your local CRISIS HOTLINE 420-0742 or 085-153-9587  · Call your local Children's Mobile Crisis Response Team [place] (950) 781-5475 or www.[employer]  · Call the toll free National Suicide Prevention Hotlines   · National Suicide Prevention Lifeline 373-155-DYFK (5329)  · National Hope Line Network 800-SUICIDE (851-9764)

## 2018-12-17 NOTE — DISCHARGE PLANNING
Care Transition Team Discharge Planning       12/17/18  Spoke with [facility] on Friday 12/15/18 to inform that patient has a rescheduled appointment for 12/19/18@0800. Per [facility], after patient has had appointment, they will see patient. No further discharge needs identified.

## 2018-12-17 NOTE — PROGRESS NOTES
"An unmarked bottle with three pills were found in the patient's medication drawer. Patient says that the bottle is her \"everything bottle\" for all of the medications she takes so that she has them with her. This RN explained to patient that they can not be released back to her because they are not labeled with her name and it is unknown what kind of medications the bottle contains. Patient verbalizes understanding.    Supervisor Juvenal Chu advised of the situation.   "

## 2018-12-17 NOTE — DISCHARGE SUMMARY
Discharge Summary    CHIEF COMPLAINT ON ADMISSION  Chief Complaint   Patient presents with   • Abdominal Pain       Reason for Admission  EMS     Admission Date  12/4/2018    CODE STATUS  Full Code    HPI & HOSPITAL COURSE  This is a 38 y.o. female here with history of lupus end-stage renal disease on hemodialysis as well as fibromyalgia and chronic pain syndrome she was admitted on December 4 with abdominal pain constipation hyperkalemia and uremia after missing hemodialysis.  She received hemodialysis and was clinically improving however was having episodes of lethargy and change in mental status.  EEG was done and revealed status epilepticus she was transferred to the intensive care unit and monitored on continuous EEG.  She was evaluated by neurology and started on Keppra and Vimpat and her doses adjusted.  Her EEG improved and continuous EEG was discontinued.  She did not have any recurrence of her seizures clinically.  She is tolerating her diet and is otherwise clinically stable for discharge  Reviewed with her seizure precautions and importance to comply with the prescribed medications hemodialysis follow-up with primary care outpatient dialysis and neurology  Her Wellbutrin was discontinued given concern following the seizure threshold and she will need to be reevaluated for continued medical therapy with an alternative agent if needed MRI of the brain was done and was negative for acute pathology.  I asked our  to assist her with follow-up appointments with PCP and with neurology       Therefore, she is discharged in good and stable condition to home with close outpatient follow-up.    The patient met 2-midnight criteria for an inpatient stay at the time of discharge.    Discharge Date  12/17/2018    FOLLOW UP ITEMS POST DISCHARGE  Seizure precautions and abstaining from driving DMV form completed and given  to fax a DMV  Follow-up with PCP and reevaluate alternative therapy to  Wellbutrin if warranted  Follow-up with neurology and hemodialysis    DISCHARGE DIAGNOSES  Principal Problem:    Status epilepticus (HCC) POA: Yes  Active Problems:    Lupus (systemic lupus erythematosus) (HCC) POA: Yes    ESRD (end stage renal disease) on dialysis (HCC) POA: Yes    Hypertension POA: Yes    Avascular necrosis (HCC) POA: Yes      Overview: Bilateral hips      Chronic      At baseline, continue home regimen  Resolved Problems:    Drug-seeking behavior (Chronic) POA: Yes    Encephalopathy POA: Unknown    Metabolic encephalopathy POA: Yes    Hyperkalemia POA: Yes    AP (abdominal pain) POA: Yes    UTI (urinary tract infection) POA: Yes    Gastritis POA: Unknown    Metabolic alkalosis POA: Yes      FOLLOW UP  Future Appointments  Date Time Provider Department Center   12/18/2018 8:15 AM Quinn Chandler M.D. [facility] None   12/19/2018 8:00 AM Neftaly Diaz M.D. [address]     No follow-up provider specified.    MEDICATIONS ON DISCHARGE     Medication List      START taking these medications      Instructions   amLODIPine 5 MG Tabs  Start taking on:  12/18/2018  Commonly known as:  NORVASC   Take 1 Tab by mouth every day.  Dose:  5 mg     Lacosamide 100 MG Tabs  Commonly known as:  VIMPAT   Take 100 mg by mouth 2 Times a Day for 30 days.  Dose:  100 mg     levetiracetam 1000 MG tablet  Commonly known as:  KEPPRA   Take 1 Tab by mouth every day. Take after hemodialysis  Dose:  1000 mg     lidocaine 5 % Ptch  Commonly known as:  LIDODERM   Apply 1 Patch to skin as directed every 24 hours.  Dose:  1 Patch     polyethylene glycol/lytes Pack  Commonly known as:  MIRALAX   Take 1 Packet by mouth every day.  Dose:  17 g     senna-docusate 8.6-50 MG Tabs  Commonly known as:  PERICOLACE or SENOKOT S   Take 2 Tabs by mouth 2 Times a Day.  Dose:  2 Tab     sucralfate 1 GM Tabs  Commonly known as:  CARAFATE   Take 1 Tab by mouth every 6 hours.  Dose:  1 g        CHANGE how you take these medications       Instructions   LYRICA 100 MG Caps  What changed:  Another medication with the same name was removed. Continue taking this medication, and follow the directions you see here.  Generic drug:  pregabalin   TK 1 C PO TID        CONTINUE taking these medications      Instructions   ascorbic acid 500 MG Tabs  Commonly known as:  ascorbic acid   Take 500 mg by mouth every day.  Dose:  500 mg     cholecalciferol 5000 UNIT Caps  Commonly known as:  VITAMIN D3   Take 10,000 Units by mouth every day.  Dose:  46125 Units     ferrous sulfate 325 (65 Fe) MG tablet   Take 325 mg by mouth every day.  Dose:  325 mg     hydroxychloroquine 200 MG Tabs  Commonly known as:  PLAQUENIL   Take 200 mg by mouth every bedtime.  Dose:  200 mg     omeprazole 20 MG delayed-release capsule  Commonly known as:  PRILOSEC   Take 1 Cap by mouth every day.  Dose:  20 mg     RENVELA 800 MG Tabs tablet  Generic drug:  sevelamer carbonate   Take 2,400 mg by mouth 3 times a day, with meals.  Dose:  2400 mg     tizanidine 4 MG Tabs  Commonly known as:  ZANAFLEX   Take 2 Tabs by mouth every bedtime.  Dose:  8 mg     traZODone 50 MG Tabs  Commonly known as:  DESYREL   Take 1 Tab by mouth every bedtime.  Dose:  50 mg        STOP taking these medications    WELLBUTRIN  MG XL tablet  Generic drug:  buPROPion            Allergies  Allergies   Allergen Reactions   • Lorazepam [Ativan] Anxiety     Patient becomes severely paranoid and agitated   • Morphine Itching     Tolerates Dilaudid   • Seasonal Runny Nose and Itching     Hay fever, sabiha brush       DIET  Orders Placed This Encounter   Procedures   • Diet Order Renal     Standing Status:   Standing     Number of Occurrences:   1     Order Specific Question:   Diet:     Answer:   Renal [8]       ACTIVITY  As tolerated.  Weight bearing as tolerated    CONSULTATIONS  Neurology  Nephrology    PROCEDURES  Hemodialysis and continuous EEG    LABORATORY  Lab Results   Component Value Date    SODIUM 136  12/16/2018    POTASSIUM 4.4 12/16/2018    CHLORIDE 96 12/16/2018    CO2 23 12/16/2018    GLUCOSE 83 12/16/2018    BUN 55 (H) 12/16/2018    CREATININE 9.44 (HH) 12/16/2018    CREATININE 0.9 12/13/2008        Lab Results   Component Value Date    WBC 5.4 12/16/2018    HEMOGLOBIN 9.9 (L) 12/16/2018    HEMATOCRIT 30.2 (L) 12/16/2018    PLATELETCT 138 (L) 12/16/2018        Total time of the discharge process exceeds 35 minutes.

## 2018-12-17 NOTE — PROGRESS NOTES
Pt complaining of extreme burning and neuropathy in extremities d/t her Fibromyalgia. Reports not having taken her home med of Lyrica and requested I call the doctor to prescribe this for her.     Updated Dr. Shaikh of patient's request. New orders entered in Epic by MD.

## 2018-12-17 NOTE — PROGRESS NOTES
Discharge instructions discussed with patient; all questions and concerns addressed. Signed copy is in with the physical chart. Port was flushed with heparin and de-accessed with a gauze and Tegaderm placed over site. Patient was wheeled down to [facility] via wheelchair by this RN and helped into friend Dominga's car with personal possessions.

## 2018-12-17 NOTE — CARE PLAN
Problem: Skin Integrity  Goal: Risk for impaired skin integrity will decrease  Outcome: MET Date Met: 12/16/18  Patient's skin is intact and blanching. No signs of new skin breakdown or pressure ulcers. Patient showered today.   Intervention: Assess risk factors for impaired skin integrity and/or pressure ulcers  N/A. Patient turns self in bed. Patient has been out of bed and showered.  Intervention: Implement precautions to protect skin integrity in collaboration with the interdisciplinary team  Mepilex in place. Oral intake is very adequate, patient turns self, waffle cushion in place, and pillows in place for support

## 2018-12-17 NOTE — CARE PLAN
Problem: Safety  Goal: Will remain free from injury  Outcome: PROGRESSING AS EXPECTED  Pt educated on use of call light before ambulating. Pt calls appropriately. Bed locked/low position with alarm activated.     Problem: Mobility  Goal: Risk for activity intolerance will decrease  Outcome: PROGRESSING AS EXPECTED  Pt is a standby assist; steady on feet; no assistive devices needed.

## 2018-12-17 NOTE — PROGRESS NOTES
Hemodialysis done today, started @ 0944 and ended @ 1247 with net UF= 200ml, unable to remove ordered fluids due to low BP, Dr Gotti notified. Report given to JOSETTE Puga. See flow sheet for details.

## 2018-12-18 ENCOUNTER — PATIENT OUTREACH (OUTPATIENT)
Dept: HEALTH INFORMATION MANAGEMENT | Facility: OTHER | Age: 38
End: 2018-12-18

## 2018-12-18 ENCOUNTER — OFFICE VISIT (OUTPATIENT)
Dept: PHYSICAL MEDICINE AND REHAB | Facility: MEDICAL CENTER | Age: 38
End: 2018-12-18
Payer: MEDICARE

## 2018-12-18 VITALS
WEIGHT: 173.94 LBS | HEART RATE: 93 BPM | OXYGEN SATURATION: 91 % | DIASTOLIC BLOOD PRESSURE: 60 MMHG | BODY MASS INDEX: 28.98 KG/M2 | HEIGHT: 65 IN | TEMPERATURE: 98.2 F | SYSTOLIC BLOOD PRESSURE: 110 MMHG

## 2018-12-18 DIAGNOSIS — G89.29 CHRONIC BILATERAL LOW BACK PAIN WITHOUT SCIATICA: ICD-10-CM

## 2018-12-18 DIAGNOSIS — N18.6 ESRD (END STAGE RENAL DISEASE) ON DIALYSIS (HCC): ICD-10-CM

## 2018-12-18 DIAGNOSIS — M62.838 MUSCLE SPASM: ICD-10-CM

## 2018-12-18 DIAGNOSIS — F11.90 CHRONIC, CONTINUOUS USE OF OPIOIDS: ICD-10-CM

## 2018-12-18 DIAGNOSIS — T40.2X5A THERAPEUTIC OPIOID INDUCED CONSTIPATION: ICD-10-CM

## 2018-12-18 DIAGNOSIS — M54.50 CHRONIC BILATERAL LOW BACK PAIN WITHOUT SCIATICA: ICD-10-CM

## 2018-12-18 DIAGNOSIS — M79.7 FIBROMYALGIA: ICD-10-CM

## 2018-12-18 DIAGNOSIS — M79.10 MYALGIA: ICD-10-CM

## 2018-12-18 DIAGNOSIS — K59.03 THERAPEUTIC OPIOID INDUCED CONSTIPATION: ICD-10-CM

## 2018-12-18 DIAGNOSIS — Z99.2 ESRD (END STAGE RENAL DISEASE) ON DIALYSIS (HCC): ICD-10-CM

## 2018-12-18 DIAGNOSIS — M87.051 AVASCULAR NECROSIS OF BONES OF BOTH HIPS (HCC): ICD-10-CM

## 2018-12-18 DIAGNOSIS — M87.052 AVASCULAR NECROSIS OF BONES OF BOTH HIPS (HCC): ICD-10-CM

## 2018-12-18 DIAGNOSIS — G62.9 PERIPHERAL POLYNEUROPATHY: ICD-10-CM

## 2018-12-18 LAB — EKG IMPRESSION: NORMAL

## 2018-12-18 PROCEDURE — 99214 OFFICE O/P EST MOD 30 MIN: CPT | Performed by: PHYSICAL MEDICINE & REHABILITATION

## 2018-12-18 RX ORDER — OXYCODONE HYDROCHLORIDE 20 MG/1
20 TABLET ORAL EVERY 6 HOURS PRN
Qty: 96 TAB | Refills: 0 | Status: SHIPPED | OUTPATIENT
Start: 2018-12-18 | End: 2019-01-17 | Stop reason: SDUPTHER

## 2018-12-18 ASSESSMENT — PAIN SCALES - GENERAL: PAINLEVEL: 8=MODERATE-SEVERE PAIN

## 2018-12-18 NOTE — PROGRESS NOTES
12/18/18 2:50pm:  CM post discharge outreach call first attempt.  VM left requesting return call to  at [phone].    12/18/18 3:30:  CM post discharge second outreach attempt.  VM left requesting return call to  at [phone]. Pt is scheduled for a hospital follow-up appointment for 12/19/18.

## 2018-12-18 NOTE — PROGRESS NOTES
Follow up patient note  Pain Medicine, Interventional spine and sports physiatry, Physical medicine rehabilitation      Chief complaint:   Cervicalgia, hips and knees, bilateral leg pain      HISTORY      HPI  Patient identification/Interval histotry: Debby Carrizales 38 y.o. female who has chronic pain related to rheumatologic disease, peripheral neuropathy and AVN hips, lupus.      Since the last visit, she has been in the ICU.  She had not been feeling well and missed dialysis.  From the ED note, she did not go to dialysis for two weeks.  As a result, she was hyperkalemic and uremic with mental status changes and lethargy.  Additionally, she was found to be in status epilepticus and has been started on keppra.  She was admitted on 12/04/2018 and discharged on 12/17/2018.      Otherwise,  pains in the aching pain in her hips, numbness and tingling in the legs and hands with some burning pain.  This pain increased in the hospital, she reports that she was not taking her lyrica for a short period.  This is doing better now.    She reports that she is having some trouble walking, feeling weak.  No falls.  Home health will be starting.  She has a walker at home that she uses while she is there.    She continues lyrica 100mg po tid without side effects.       No side effects from oxycodone, mild constipation is managed with colace.  Regular bowel movements, but she has been having more trouble with regular bowel movements despite use of colace.  Apparently, lactulose was not covered by her insurance.       ROS   Admitted to the hospital 12/4-12/17 for uremia, lower abdominal pain, ESRD, status epilepticus    Red Flags :   Chills, Sweats:No fevers, but she does report some chills and night sweats.  Involuntary Weight Loss: Denies  Bowel/Bladder Incontinence: Denies  Saddle Anesthesia: Denies    PMHx:   Past Medical History:   Diagnosis Date   • Arthritis     all joints,r/t lupus   • Avascular necrosis of bones  of both hips (HCC) 10/10/2016   • Clostridium difficile colitis 5/3/2011   • Dialysis patient    • ESBL (extended spectrum beta-lactamase) producing bacteria infection 8/25/2014   • Fibromyalgia    • Hypertension    • Lupus    • Pneumonia    • Psychiatric disorder     anxiety, depression   • Pyelonephritis 11/21/2017   • Renal failure        PSHx:   Past Surgical History:   Procedure Laterality Date   • AV FISTULA REVISION Right 8/11/2018    Procedure: AV FISTULA REVISION- Graft and Thrombectomy;  Surgeon: Shabbir Ardon M.D.;  Location: SURGERY [facility];  Service: General   • AV FISTULA THROMBOLYSIS Right 8/11/2018    Procedure: AV FISTULA THROMBOLYSIS;  Surgeon: Shabbir Ardon M.D.;  Location: SURGERY [facility];  Service: General   • AV FISTULA CREATION Right 12/9/2017    Procedure: AV FISTULA CREATION-ARM FOR GRAFT;  Surgeon: Lesly Jansen M.D.;  Location: [facility];  Service: General   • THROMBECTOMY  12/9/2017    Procedure: THROMBECTOMY;  Surgeon: Lesly Jansen M.D.;  Location: SURGERY [facility];  Service: General   • THROMBECTOMY Right 8/21/2016    Procedure: THROMBECTOMY - right AV fistula graft with grams;  Surgeon: Shabbir Ardon M.D.;  Location: [facility];  Service:    • ANGIOPLASTY BALLOON  8/21/2016    Procedure: ANGIOPLASTY BALLOON;  Surgeon: Shabbir Ardon M.D.;  Location: [facility];  Service:    • THROMBECTOMY Right 8/20/2016    Procedure: THROMBECTOMY AV GRAFT;  Surgeon: Shabbir Ardon M.D.;  Location: SURGERY [facility];  Service:    • AV FISTULA CREATION Right 7/12/2016    Procedure: AV FISTULA CREATION WITH GRAFT BRACHIAL AXILLARY;  Surgeon: Shabbir Ardon M.D.;  Location: SURGERY [facility];  Service:    • CLOSED REDUCTION Right 7/5/2016    Procedure: CLOSED REDUCTION- Hip ;  Surgeon: Michael Holman M.D.;  Location: [facility];  Service:    • HIP ARTHROPLASTY TOTAL Right 1/18/2016     Procedure: HIP ARTHROPLASTY TOTAL;  Surgeon: Michael Holman M.D.;  Location: SURGERY [facility];  Service:    • AV FISTULA CREATION  2/3/2015    Performed by Shabbir Ardon M.D. at SURGERY [facility]   • AV FISTULA CREATION  11/14/2014    Performed by Shabbir Ardon M.D. at SURGERY [facility]   • AV FISTULA CREATION  9/9/2014    Performed by Shabbir Ardon M.D. at SURGERY [facility]   • CATH PLACEMENT  9/9/2014    Performed by Shabbir Ardon M.D. at SURGERY [facility]   • OTHER  5/2011    tracheostomy   • GASTROSCOPY-ENDO  4/27/2011    Performed by PALMIRA DOAN at ENDOSCOPY [facility]   • COLONOSCOPY WITH BIOPSY  4/20/2011    Performed by ALCIRA CRUZ at [facility]   • GASTROSCOPY-ENDO  4/18/2011    Performed by ALCIRA CRUZ at ENDOSCOPY [facility]   • GASTROSCOPY-ENDO  4/10/2011    Performed by SYED JURADO at ENDOSCOPY [facility]   • SCLEROTHERAPHY  4/10/2011    Performed by SYED JURADO at ENDOSCOPY [facility]   • OTHER ABDOMINAL SURGERY      kidney biopsy   • TRACHEOSTOMY         Family history   Denies neuromuscular disease  Family History   Problem Relation Age of Onset   • Heart Disease Mother    • Cancer Father        Medications:   Outpatient Prescriptions Marked as Taking for the 12/18/18 encounter (Office Visit) with Quinn Chandler M.D.   Medication Sig Dispense Refill   • Oxycodone HCl 20 MG Tab Take 1 Tab by mouth every 6 hours as needed (Take up to three a day as needed and one additional pill after dialysis (3 times a week)) for up to 28 days. 96 Tab 0   • LYRICA 100 MG Cap TK 1 C PO TID  2   • lidocaine (LIDODERM) 5 % Patch Apply 1 Patch to skin as directed every 24 hours. 10 Patch 1   • senna-docusate (PERICOLACE OR SENOKOT S) 8.6-50 MG Tab Take 2 Tabs by mouth 2 Times a Day. 30 Tab 0   • polyethylene glycol/lytes (MIRALAX) Pack Take 1 Packet by mouth every day. 100 Each 0   • sucralfate  (CARAFATE) 1 GM Tab Take 1 Tab by mouth every 6 hours. 120 Tab 3   • omeprazole (PRILOSEC) 20 MG delayed-release capsule Take 1 Cap by mouth every day. 30 Cap 3   • sevelamer carbonate (RENVELA) 800 MG Tab tablet Take 2,400 mg by mouth 3 times a day, with meals.     • ferrous sulfate 325 (65 FE) MG tablet Take 325 mg by mouth every day.     • ascorbic acid (ASCORBIC ACID) 500 MG Tab Take 500 mg by mouth every day.     • hydroxychloroquine (PLAQUENIL) 200 MG Tab Take 200 mg by mouth every bedtime.     • cholecalciferol (VITAMIN D3) 5000 UNIT Cap Take 10,000 Units by mouth every day.     • trazodone (DESYREL) 50 MG Tab Take 1 Tab by mouth every bedtime. 30 Tab 0   • tizanidine (ZANAFLEX) 4 MG Tab Take 2 Tabs by mouth every bedtime. 60 Tab 0       Allergies:   Allergies   Allergen Reactions   • Lorazepam [Ativan] Anxiety     Patient becomes severely paranoid and agitated   • Morphine Itching     Tolerates Dilaudid   • Seasonal Runny Nose and Itching     Hay fever, sabiha brush       Social Hx:   Social History     Social History   • Marital status: Single     Spouse name: N/A   • Number of children: N/A   • Years of education: N/A     Occupational History   • Not on file.     Social History Main Topics   • Smoking status: Former Smoker     Packs/day: 0.50     Years: 18.00     Types: Cigarettes     Quit date: 6/13/2011   • Smokeless tobacco: Never Used      Comment: 1/2 ppd   • Alcohol use No   • Drug use: No   • Sexual activity: Not on file     Other Topics Concern   •  Service No   • Blood Transfusions Yes   • Caffeine Concern No   • Occupational Exposure No   • Hobby Hazards No   • Sleep Concern Yes   • Stress Concern Yes   • Weight Concern Yes   • Special Diet Yes   • Back Care No   • Exercise Yes   • Bike Helmet No   • Seat Belt Yes   • Self-Exams Yes     Social History Narrative   • No narrative on file       EXAMINATION     Physical Exam:   Vitals: Blood pressure 110/60, pulse 93, temperature 36.8 °C (98.2  "°F), temperature source Temporal, height 1.651 m (5' 5\"), weight 78.9 kg (173 lb 15.1 oz), last menstrual period 12/04/2018, SpO2 91 %, not currently breastfeeding.    Constitutional:   Body Habitus: Body mass index is 28.95 kg/m².  Cooperation: Fully cooperates with exam  Appearance: Well-groomed no disheveled, in no acute distress, Appears pale, thin  Respiratory-  breathing comfortable on room air, no wheezing.  Cardiovascular- No pitting edema noted in the lower extremities bilaterally  Psychiatric- alert and oriented ×3. Normal affect.   Spine: Tenderness to palpation over the lower thoracic rib cage on the left with note of tender points in the thoracic paraspinals.  Slightly tenderness over left trapezius, greater on the right.  Gait slow, steady without loss of balance.  No use of assist device      MEDICAL DECISION MAKING    DATA    Labs:     12/15/2018 CRP 3.03    BMP 12/16/2018  Na 136, Potassium 4.4, chloride 96, CO2 23 Glucose 83, BUN 55H, Cr 9.44H, Calcium 9.9, Anion gap 17.0H    CBC 3.3, Hb 9.6, Hct 31.3, Plt 115    Lab Results   Component Value Date/Time    HBA1C 4.9 06/07/2018 02:29 AM        Imaging:   I reviewed the following radiology reports reviewed    MRI brain 12/13/2018  1.  There is no hippocampal sclerosis.  2.  There is no evidence of cortical dysplasia or neuronal migrational abnormalities.  3.  A few nonspecific T2 hyperintensities in the supratentorial white matter likely representing nonspecific foci of gliosis, chronic ischemia and demyelination. This is unchanged since the previous MRI dated 2/23/2012.       Chest xray 06/07/2018: Minimal right-sided opacities as described above, suggesting pneumonia.               Results for orders placed during the hospital encounter of 07/19/17   MR-LUMBAR SPINE-W/O    Impression 1.  Diffuse decreased bone marrow signal intensity throughout the lumbar spine and sacrum, presumably secondary to chronic anemia.    2.  Otherwise unremarkable MRI " scan of the lumbar spine without contrast.                                    DIAGNOSIS   Visit Diagnoses     ICD-10-CM   1. Chronic bilateral low back pain without sciatica M54.5    G89.29   2. Therapeutic opioid induced constipation K59.03    T40.2X5A   3. Avascular necrosis of bones of both hips (HCC) M87.051    M87.052   4. Peripheral polyneuropathy ([facility]) G62.9   5. Chronic, continuous use of opioids F11.90   6. ESRD (end stage renal disease) on dialysis ([facility]) N18.6    Z99.2   7. Myalgia M79.10   8. Muscle spasm M62.838   9. Fibromyalgia M79.7         ASSESSMENT and PLAN:     Debby Carrizales 37 y.o. Female returns for follow-up of her chronic pain related to lupus, AVN hips, low back and peripheral neuropathy    Debby was seen today for follow-up.    Diagnoses and all orders for this visit:    Chronic bilateral low back pain without sciatica  -     Oxycodone HCl 20 MG Tab; Take 1 Tab by mouth every 6 hours as needed (Take up to three a day as needed and one additional pill after dialysis (3 times a week)) for up to 28 days.    Therapeutic opioid induced constipation    Avascular necrosis of bones of both hips (HCC)  -     Oxycodone HCl 20 MG Tab; Take 1 Tab by mouth every 6 hours as needed (Take up to three a day as needed and one additional pill after dialysis (3 times a week)) for up to 28 days.    Peripheral polyneuropathy (HCC)  -     Oxycodone HCl 20 MG Tab; Take 1 Tab by mouth every 6 hours as needed (Take up to three a day as needed and one additional pill after dialysis (3 times a week)) for up to 28 days.    Chronic, continuous use of opioids    ESRD (end stage renal disease) on dialysis ([facility])    Myalgia    Muscle spasm    Fibromyalgia  -     Oxycodone HCl 20 MG Tab; Take 1 Tab by mouth every 6 hours as needed (Take up to three a day as needed and one additional pill after dialysis (3 times a week)) for up to 28 days.       Discussed trial of a series of trigger point injections.  Plan to delay  "this for now.  Start home health therapies.  She reports that this will start tomorrow.    Continue with script to every 28 days.  She has difficulty with arranging transportation and it will be helpful to have it coincide with her appointments.    Trial lactulose ordered at last visit, but she reports that this was denied by her insurance.    Discussed continuing oxycodone as previously written.    In prescribing controlled substances to this patient, I certify that I have obtained and reviewed the medical history of Debby Carrizales. I have also made a good ly effort to obtain applicable records from other providers who have treated the patient and records demonstrating the following: report of \"drug-seeking behavior,\" although I suspect that she has organic reasons for pain and will continue to monitor as appropriate.     I have conducted a physical exam and documented it. I have reviewed Ms. Carrizales’s prescription history as maintained by the [place] Prescription Monitoring Program.     I have assessed the patient’s risk for abuse, dependency, and addiction using the validated Opioid Risk Tool available at https://www.mdcalc.com/peztys-nazi-cbfu-ort-narcotic-abuse.     Given the above, I believe the benefits of controlled substance therapy outweigh the risks. The reasons for prescribing controlled substances include non-narcotic, oral analgesic alternatives have been inadequate for pain control and in my professional opinion, controlled substances are the only reasonable choice for this patient because she has limitations to medication choices due to being on dialysis.   Accordingly, I have discussed the risk and benefits, treatment plan, and alternative therapies with the patient.       Follow up: 4 weeks and series of trigger point injections    Thank you for allowing me to participate in the care of this patient. If you have any questions please not hesitate to contact me.      Please note that " this dictation was created using voice recognition software. I have made every reasonable attempt to correct obvious errors but there may be errors of grammar and content that I may have overlooked prior to finalization of this note.      Quinn Chandler MD  Interventional Spine and Sports Physiatry  Physical Medicine and Rehabilitation  [facility]

## 2018-12-20 ENCOUNTER — HOME CARE VISIT (OUTPATIENT)
Dept: HOME HEALTH SERVICES | Facility: HOME HEALTHCARE | Age: 38
End: 2018-12-20

## 2018-12-21 ENCOUNTER — TELEPHONE (OUTPATIENT)
Dept: HEALTH INFORMATION MANAGEMENT | Facility: OTHER | Age: 38
End: 2018-12-21

## 2018-12-21 ENCOUNTER — PATIENT OUTREACH (OUTPATIENT)
Dept: HEALTH INFORMATION MANAGEMENT | Facility: OTHER | Age: 38
End: 2018-12-21

## 2018-12-21 ENCOUNTER — OFFICE VISIT (OUTPATIENT)
Dept: MEDICAL GROUP | Facility: MEDICAL CENTER | Age: 38
End: 2018-12-21
Payer: MEDICARE

## 2018-12-21 VITALS
WEIGHT: 176 LBS | SYSTOLIC BLOOD PRESSURE: 102 MMHG | TEMPERATURE: 100.4 F | DIASTOLIC BLOOD PRESSURE: 70 MMHG | BODY MASS INDEX: 29.32 KG/M2 | HEART RATE: 114 BPM | HEIGHT: 65 IN | OXYGEN SATURATION: 98 % | RESPIRATION RATE: 16 BRPM

## 2018-12-21 DIAGNOSIS — G40.901 STATUS EPILEPTICUS (HCC): ICD-10-CM

## 2018-12-21 DIAGNOSIS — N18.6 ESRD (END STAGE RENAL DISEASE) ON DIALYSIS (HCC): ICD-10-CM

## 2018-12-21 DIAGNOSIS — M79.7 FIBROMYALGIA: ICD-10-CM

## 2018-12-21 DIAGNOSIS — Z99.2 ESRD (END STAGE RENAL DISEASE) ON DIALYSIS (HCC): ICD-10-CM

## 2018-12-21 DIAGNOSIS — M87.052 AVASCULAR NECROSIS OF BONES OF BOTH HIPS (HCC): ICD-10-CM

## 2018-12-21 DIAGNOSIS — M87.051 AVASCULAR NECROSIS OF BONES OF BOTH HIPS (HCC): ICD-10-CM

## 2018-12-21 DIAGNOSIS — M32.9 SYSTEMIC LUPUS ERYTHEMATOSUS, UNSPECIFIED SLE TYPE, UNSPECIFIED ORGAN INVOLVEMENT STATUS (HCC): ICD-10-CM

## 2018-12-21 PROCEDURE — 99214 OFFICE O/P EST MOD 30 MIN: CPT | Performed by: NURSE PRACTITIONER

## 2018-12-21 NOTE — PROGRESS NOTES
"cc:  Hospital follow up      Subjective:     HPI:     Debby Carrizales is a 38 y.o. female here to discuss the evaluation and management of:    Was hospitalized from 12/4-12/17/2018.   Patient had went to the Emergency room for abdominal pain, states she has avascular necrosis and has chronic pain. During her hospitalization she was found to have altered mentation and lethargy in which she subsequently had an EEG completed which showed status epilepticus.     Status epilepticus  Was recently diagnosed with status epilepticus confirmed by EEG and Neurology. Since her discharge from the hospital she states she has been taking her Vimpat daily but has not picked up her Keppra. States will do that today. Does not know when her follow up appointment is with neurology.  States she had a seizure on Wednesday the 12th. Has not taken the Keppra yet as of discharge. She states that becoming \"sleepy, confused and lethargic\" is her cue to her seizure. She is not driving.     ESRD  Last time she had dialysis was on Wednesday.   GFR recently was 5. Goes to Dialysis M, W, and Friday.    Lupus/Fibromyalgia  This is a chronic problem.     Avascular necrosis  States has bilateral avascular necrosis in her hips. She states she has chronic pain and follows up with pain management       ROS:  Denies any Headache, Blurred Vision, Confusion, Chest pain,  Shortness of breath,  Abdominal pain, Changes of bowel or bladder, Lower ext edema, Fevers, Nights sweats, Weight Changes, Focal weakness or numbness.  And all other systems are negative.        Current Outpatient Prescriptions:   •  Oxycodone HCl 20 MG Tab, Take 1 Tab by mouth every 6 hours as needed (Take up to three a day as needed and one additional pill after dialysis (3 times a week)) for up to 28 days., Disp: 96 Tab, Rfl: 0  •  amLODIPine (NORVASC) 5 MG Tab, Take 1 Tab by mouth every day., Disp: 30 Tab, Rfl: 0  •  levETIRAcetam (KEPPRA) 1000 MG tablet, Take 1 Tab by mouth " every day. Take after hemodialysis, Disp: 30 Tab, Rfl: 0  •  Lacosamide (VIMPAT) 100 MG Tab, Take 100 mg by mouth 2 Times a Day for 30 days., Disp: 60 Tab, Rfl: 0  •  LYRICA 100 MG Cap, TK 1 C PO TID, Disp: , Rfl: 2  •  sucralfate (CARAFATE) 1 GM Tab, Take 1 Tab by mouth every 6 hours., Disp: 120 Tab, Rfl: 3  •  omeprazole (PRILOSEC) 20 MG delayed-release capsule, Take 1 Cap by mouth every day., Disp: 30 Cap, Rfl: 3  •  sevelamer carbonate (RENVELA) 800 MG Tab tablet, Take 2,400 mg by mouth 3 times a day, with meals., Disp: , Rfl:   •  ferrous sulfate 325 (65 FE) MG tablet, Take 325 mg by mouth every day., Disp: , Rfl:   •  ascorbic acid (ASCORBIC ACID) 500 MG Tab, Take 500 mg by mouth every day., Disp: , Rfl:   •  hydroxychloroquine (PLAQUENIL) 200 MG Tab, Take 200 mg by mouth every bedtime., Disp: , Rfl:   •  cholecalciferol (VITAMIN D3) 5000 UNIT Cap, Take 10,000 Units by mouth every day., Disp: , Rfl:   •  trazodone (DESYREL) 50 MG Tab, Take 1 Tab by mouth every bedtime., Disp: 30 Tab, Rfl: 0  •  tizanidine (ZANAFLEX) 4 MG Tab, Take 2 Tabs by mouth every bedtime., Disp: 60 Tab, Rfl: 0  •  lidocaine (LIDODERM) 5 % Patch, Apply 1 Patch to skin as directed every 24 hours., Disp: 10 Patch, Rfl: 1  •  senna-docusate (PERICOLACE OR SENOKOT S) 8.6-50 MG Tab, Take 2 Tabs by mouth 2 Times a Day., Disp: 30 Tab, Rfl: 0  •  polyethylene glycol/lytes (MIRALAX) Pack, Take 1 Packet by mouth every day., Disp: 100 Each, Rfl: 0    Allergies   Allergen Reactions   • Lorazepam [Ativan] Anxiety     Patient becomes severely paranoid and agitated   • Morphine Itching     Tolerates Dilaudid   • Seasonal Runny Nose and Itching     Hay fever, sabiha brush       Past Medical History:   Diagnosis Date   • Arthritis     all joints,r/t lupus   • Avascular necrosis of bones of both hips (HCC) 10/10/2016   • Clostridium difficile colitis 5/3/2011   • Dialysis patient    • ESBL (extended spectrum beta-lactamase) producing bacteria infection  8/25/2014   • Fibromyalgia    • Hypertension    • Lupus    • Pneumonia    • Psychiatric disorder     anxiety, depression   • Pyelonephritis 11/21/2017   • Renal failure      Past Surgical History:   Procedure Laterality Date   • AV FISTULA REVISION Right 8/11/2018    Procedure: AV FISTULA REVISION- Graft and Thrombectomy;  Surgeon: Shabbir Ardon M.D.;  Location: [facility];  Service: General   • AV FISTULA THROMBOLYSIS Right 8/11/2018    Procedure: AV FISTULA THROMBOLYSIS;  Surgeon: Shabbir Ardon M.D.;  Location: [facility];  Service: General   • AV FISTULA CREATION Right 12/9/2017    Procedure: AV FISTULA CREATION-ARM FOR GRAFT;  Surgeon: Lesly Jansen M.D.;  Location: [facility];  Service: General   • THROMBECTOMY  12/9/2017    Procedure: THROMBECTOMY;  Surgeon: Lesly Jansen M.D.;  Location: [facility];  Service: General   • THROMBECTOMY Right 8/21/2016    Procedure: THROMBECTOMY - right AV fistula graft with grams;  Surgeon: Shabbir Ardon M.D.;  Location: [facility];  Service:    • ANGIOPLASTY BALLOON  8/21/2016    Procedure: ANGIOPLASTY BALLOON;  Surgeon: Shabbir Ardon M.D.;  Location: [facility];  Service:    • THROMBECTOMY Right 8/20/2016    Procedure: THROMBECTOMY AV GRAFT;  Surgeon: Shabbir Ardon M.D.;  Location: [facility];  Service:    • AV FISTULA CREATION Right 7/12/2016    Procedure: AV FISTULA CREATION WITH GRAFT BRACHIAL AXILLARY;  Surgeon: Shabbir Ardon M.D.;  Location: [facility];  Service:    • CLOSED REDUCTION Right 7/5/2016    Procedure: CLOSED REDUCTION- Hip ;  Surgeon: Michael Holman M.D.;  Location: [facility];  Service:    • HIP ARTHROPLASTY TOTAL Right 1/18/2016    Procedure: HIP ARTHROPLASTY TOTAL;  Surgeon: Michael Holman M.D.;  Location: [facility];  Service:    • AV FISTULA CREATION  2/3/2015    Performed by Shabbir PRITCHARD  RAMON Ardon at SURGERY [facility]   • AV FISTULA CREATION  11/14/2014    Performed by Shabbir Ardon M.D. at SURGERY [facility]   • AV FISTULA CREATION  9/9/2014    Performed by Shabbir Ardon M.D. at SURGERY [facility]   • CATH PLACEMENT  9/9/2014    Performed by Shabbir Ardon M.D. at SURGERY [facility]   • OTHER  5/2011    tracheostomy   • GASTROSCOPY-ENDO  4/27/2011    Performed by PALMIRA DOAN at ENDOSCOPY [facility]   • COLONOSCOPY WITH BIOPSY  4/20/2011    Performed by ALCIRA CRUZ at ENDOSCOPY [facility]   • GASTROSCOPY-ENDO  4/18/2011    Performed by ALCIRA CRUZ at ENDOSCOPY [facility]   • GASTROSCOPY-ENDO  4/10/2011    Performed by SYED JURADO at ENDOSCOPY [facility]   • SCLEROTHERAPHY  4/10/2011    Performed by SYED JURADO at ENDOSCOPY [facility]   • OTHER ABDOMINAL SURGERY      kidney biopsy   • TRACHEOSTOMY       Family History   Problem Relation Age of Onset   • Heart Disease Mother    • Cancer Father      Social History     Social History   • Marital status: Single     Spouse name: N/A   • Number of children: N/A   • Years of education: N/A     Occupational History   • Not on file.     Social History Main Topics   • Smoking status: Former Smoker     Packs/day: 0.50     Years: 18.00     Types: Cigarettes     Quit date: 6/13/2011   • Smokeless tobacco: Never Used      Comment: 1/2 ppd   • Alcohol use No   • Drug use: No   • Sexual activity: Not on file     Other Topics Concern   •  Service No   • Blood Transfusions Yes   • Caffeine Concern No   • Occupational Exposure No   • Hobby Hazards No   • Sleep Concern Yes   • Stress Concern Yes   • Weight Concern Yes   • Special Diet Yes   • Back Care No   • Exercise Yes   • Bike Helmet No   • Seat Belt Yes   • Self-Exams Yes     Social History Narrative   • No narrative on file       Objective:     Vitals: /70   Pulse (!) 114   Temp 38 °C (100.4 °F)    "Resp 16   Ht 1.651 m (5' 5\")   Wt 79.8 kg (176 lb)   LMP 12/04/2018   SpO2 98%   BMI 29.29 kg/m²    General: Alert, pleasant, NAD  HEENT: Normocephalic.   Heart: Regular rate and rhythm.  S1 and S2 normal.  No murmurs appreciated.  Respiratory: Normal respiratory effort.  Clear to auscultation bilaterally.  Skin: Warm, dry, no rashes.  Musculoskeletal: Gait is normal.  Moves all extremities well.  Extremities: No leg edema. No discoloration. Right upper arm fistula  Neurological: No tremors, sensation grossly intact  Psych:  Affect/mood is normal, judgement is good, memory is intact, grooming is appropriate.    Assessment/Plan:     Debby was seen today for follow-up.    Diagnoses and all orders for this visit:    Status epilepticus (HCC)  New diagnosis for patient. Have advised her to take her medications as directed daily to prevent possible serious complication. Have asked our on site  to see if she can get an earlier appointment with Neurology. An appointment was made for the patient on the 24th of this month, however she is unable to make that appointment. She states she will call the office and schedule a more convenient time. Stressed the importance of appropriate follow up. She has a follow up with her PCP in January.   -     REFERRAL TO NEUROLOGY    ESRD (end stage renal disease) on dialysis (HCC)  Chronic. Most recent GFR was 5. She has been going to dialysis on schedule since discharge.     Avascular necrosis of bones of both hips (HCC)  Chronic. Followed by pain management.     Fibromyalgia  Chronic. On chronic pain medications-followed by pain management.     Systemic lupus erythematosus, unspecified SLE type, unspecified organ involvement status (HCC)  Chronic. Followed by rheumatology (Dr. Collado).     No Follow-up on file.          Michaela GARRIDO  "

## 2018-12-21 NOTE — TELEPHONE ENCOUNTER
CM contacted neurology office to schedule hospital follow-up appt. Patient rescheduled to see a provider at neurology this Monday 12/24/18 at 12:45pm.  CM attempted to contact patient to provide this information. Patient's telephone went to . Patient has not previously returned CM calls. CM tried to contact family to see if another telephone number available. Per grandmother patient only has a cell. CM tried to contact patient again and left a message for patient with appointment date and time for Monday. Update to PCP.    12/21/18 12:10pm:  CM attempted outreach again. Patient answered telephone. Patient was provided information on appt at neurology for 12/24/18. Pt states she can't make this appointment. States she will call and reschedule. Pt appreciative of CM attempt to schedule appt. States she had plans on Monday and will reschedule neurology appt. Pt states she relies on transportation and will need to check her calendar to see when she can schedule.

## 2019-01-01 ENCOUNTER — APPOINTMENT (OUTPATIENT)
Dept: RADIOLOGY | Facility: MEDICAL CENTER | Age: 39
DRG: 682 | End: 2019-01-01
Attending: HOSPITALIST
Payer: MEDICARE

## 2019-01-01 ENCOUNTER — HOSPITAL ENCOUNTER (INPATIENT)
Facility: MEDICAL CENTER | Age: 39
LOS: 7 days | DRG: 682 | End: 2019-01-09
Attending: EMERGENCY MEDICINE | Admitting: HOSPITALIST
Payer: MEDICARE

## 2019-01-01 DIAGNOSIS — N18.9 ANEMIA DUE TO CHRONIC KIDNEY DISEASE, UNSPECIFIED CKD STAGE: ICD-10-CM

## 2019-01-01 DIAGNOSIS — D63.1 ANEMIA DUE TO CHRONIC KIDNEY DISEASE, UNSPECIFIED CKD STAGE: ICD-10-CM

## 2019-01-01 DIAGNOSIS — R82.81 PYURIA: ICD-10-CM

## 2019-01-01 DIAGNOSIS — R53.1 WEAKNESS: ICD-10-CM

## 2019-01-01 PROBLEM — N18.6 ANEMIA IN CHRONIC KIDNEY DISEASE, ON CHRONIC DIALYSIS (HCC): Status: ACTIVE | Noted: 2019-01-01

## 2019-01-01 PROBLEM — R62.7 FAILURE TO THRIVE IN ADULT: Status: ACTIVE | Noted: 2019-01-01

## 2019-01-01 PROBLEM — Z99.2 ANEMIA IN CHRONIC KIDNEY DISEASE, ON CHRONIC DIALYSIS (HCC): Status: ACTIVE | Noted: 2019-01-01

## 2019-01-01 LAB
ALBUMIN SERPL BCP-MCNC: 3.4 G/DL (ref 3.2–4.9)
ALBUMIN/GLOB SERPL: 1.1 G/DL
ALP SERPL-CCNC: 63 U/L (ref 30–99)
ALT SERPL-CCNC: <5 U/L (ref 2–50)
AMPHET UR QL SCN: NEGATIVE
ANION GAP SERPL CALC-SCNC: 22 MMOL/L (ref 0–11.9)
APPEARANCE UR: CLEAR
AST SERPL-CCNC: 5 U/L (ref 12–45)
BACTERIA #/AREA URNS HPF: NEGATIVE /HPF
BARBITURATES UR QL SCN: NEGATIVE
BASOPHILS # BLD AUTO: 0.5 % (ref 0–1.8)
BASOPHILS # BLD: 0.03 K/UL (ref 0–0.12)
BENZODIAZ UR QL SCN: NEGATIVE
BILIRUB SERPL-MCNC: 0.5 MG/DL (ref 0.1–1.5)
BILIRUB UR QL STRIP.AUTO: NEGATIVE
BUN SERPL-MCNC: 96 MG/DL (ref 8–22)
BZE UR QL SCN: NEGATIVE
CALCIUM SERPL-MCNC: 8.6 MG/DL (ref 8.5–10.5)
CANNABINOIDS UR QL SCN: NEGATIVE
CHLORIDE SERPL-SCNC: 100 MMOL/L (ref 96–112)
CO2 SERPL-SCNC: 16 MMOL/L (ref 20–33)
COLOR UR: YELLOW
CREAT SERPL-MCNC: 15.98 MG/DL (ref 0.5–1.4)
EOSINOPHIL # BLD AUTO: 0.22 K/UL (ref 0–0.51)
EOSINOPHIL NFR BLD: 3.6 % (ref 0–6.9)
EPI CELLS #/AREA URNS HPF: NEGATIVE /HPF
ERYTHROCYTE [DISTWIDTH] IN BLOOD BY AUTOMATED COUNT: 45.9 FL (ref 35.9–50)
FERRITIN SERPL-MCNC: 320.8 NG/ML (ref 10–291)
FOLATE SERPL-MCNC: 14.9 NG/ML
GLOBULIN SER CALC-MCNC: 3.2 G/DL (ref 1.9–3.5)
GLUCOSE SERPL-MCNC: 88 MG/DL (ref 65–99)
GLUCOSE UR STRIP.AUTO-MCNC: 250 MG/DL
HCT VFR BLD AUTO: 24 % (ref 37–47)
HGB BLD-MCNC: 7.8 G/DL (ref 12–16)
HYALINE CASTS #/AREA URNS LPF: ABNORMAL /LPF
IMM GRANULOCYTES # BLD AUTO: 0.07 K/UL (ref 0–0.11)
IMM GRANULOCYTES NFR BLD AUTO: 1.1 % (ref 0–0.9)
IRON SATN MFR SERPL: 18 % (ref 15–55)
IRON SERPL-MCNC: 37 UG/DL (ref 40–170)
KETONES UR STRIP.AUTO-MCNC: NEGATIVE MG/DL
LEUKOCYTE ESTERASE UR QL STRIP.AUTO: ABNORMAL
LIPASE SERPL-CCNC: 22 U/L (ref 11–82)
LYMPHOCYTES # BLD AUTO: 1.24 K/UL (ref 1–4.8)
LYMPHOCYTES NFR BLD: 20.2 % (ref 22–41)
MAGNESIUM SERPL-MCNC: 3.5 MG/DL (ref 1.5–2.5)
MCH RBC QN AUTO: 30.2 PG (ref 27–33)
MCHC RBC AUTO-ENTMCNC: 32.5 G/DL (ref 33.6–35)
MCV RBC AUTO: 93 FL (ref 81.4–97.8)
METHADONE UR QL SCN: NEGATIVE
MICRO URNS: ABNORMAL
MONOCYTES # BLD AUTO: 0.7 K/UL (ref 0–0.85)
MONOCYTES NFR BLD AUTO: 11.4 % (ref 0–13.4)
NEUTROPHILS # BLD AUTO: 3.89 K/UL (ref 2–7.15)
NEUTROPHILS NFR BLD: 63.2 % (ref 44–72)
NITRITE UR QL STRIP.AUTO: NEGATIVE
NRBC # BLD AUTO: 0 K/UL
NRBC BLD-RTO: 0 /100 WBC
OPIATES UR QL SCN: NEGATIVE
OXYCODONE UR QL SCN: POSITIVE
PCP UR QL SCN: NEGATIVE
PH UR STRIP.AUTO: 7.5 [PH]
PLATELET # BLD AUTO: 154 K/UL (ref 164–446)
PMV BLD AUTO: 10.6 FL (ref 9–12.9)
POTASSIUM SERPL-SCNC: 5.2 MMOL/L (ref 3.6–5.5)
PROPOXYPH UR QL SCN: NEGATIVE
PROT SERPL-MCNC: 6.6 G/DL (ref 6–8.2)
PROT UR QL STRIP: 300 MG/DL
RBC # BLD AUTO: 2.58 M/UL (ref 4.2–5.4)
RBC # URNS HPF: ABNORMAL /HPF
RBC UR QL AUTO: NEGATIVE
SODIUM SERPL-SCNC: 138 MMOL/L (ref 135–145)
SP GR UR STRIP.AUTO: 1.01
TIBC SERPL-MCNC: 202 UG/DL (ref 250–450)
TSH SERPL DL<=0.005 MIU/L-ACNC: 1.82 UIU/ML (ref 0.38–5.33)
UROBILINOGEN UR STRIP.AUTO-MCNC: 0.2 MG/DL
VIT B12 SERPL-MCNC: 344 PG/ML (ref 211–911)
WBC # BLD AUTO: 6.2 K/UL (ref 4.8–10.8)
WBC #/AREA URNS HPF: ABNORMAL /HPF

## 2019-01-01 PROCEDURE — 80053 COMPREHEN METABOLIC PANEL: CPT

## 2019-01-01 PROCEDURE — 700102 HCHG RX REV CODE 250 W/ 637 OVERRIDE(OP): Performed by: HOSPITALIST

## 2019-01-01 PROCEDURE — 96366 THER/PROPH/DIAG IV INF ADDON: CPT

## 2019-01-01 PROCEDURE — 84443 ASSAY THYROID STIM HORMONE: CPT

## 2019-01-01 PROCEDURE — 83690 ASSAY OF LIPASE: CPT

## 2019-01-01 PROCEDURE — 80307 DRUG TEST PRSMV CHEM ANLYZR: CPT

## 2019-01-01 PROCEDURE — 87086 URINE CULTURE/COLONY COUNT: CPT

## 2019-01-01 PROCEDURE — 700105 HCHG RX REV CODE 258: Performed by: HOSPITALIST

## 2019-01-01 PROCEDURE — 82746 ASSAY OF FOLIC ACID SERUM: CPT

## 2019-01-01 PROCEDURE — G0378 HOSPITAL OBSERVATION PER HR: HCPCS

## 2019-01-01 PROCEDURE — 99285 EMERGENCY DEPT VISIT HI MDM: CPT

## 2019-01-01 PROCEDURE — 82607 VITAMIN B-12: CPT

## 2019-01-01 PROCEDURE — 700105 HCHG RX REV CODE 258

## 2019-01-01 PROCEDURE — 99220 PR INITIAL OBSERVATION CARE,LEVL III: CPT | Mod: AI | Performed by: HOSPITALIST

## 2019-01-01 PROCEDURE — 700101 HCHG RX REV CODE 250: Performed by: HOSPITALIST

## 2019-01-01 PROCEDURE — 36415 COLL VENOUS BLD VENIPUNCTURE: CPT

## 2019-01-01 PROCEDURE — 5A1D70Z PERFORMANCE OF URINARY FILTRATION, INTERMITTENT, LESS THAN 6 HOURS PER DAY: ICD-10-PCS | Performed by: INTERNAL MEDICINE

## 2019-01-01 PROCEDURE — 83540 ASSAY OF IRON: CPT

## 2019-01-01 PROCEDURE — A9270 NON-COVERED ITEM OR SERVICE: HCPCS | Performed by: HOSPITALIST

## 2019-01-01 PROCEDURE — 96365 THER/PROPH/DIAG IV INF INIT: CPT

## 2019-01-01 PROCEDURE — 700111 HCHG RX REV CODE 636 W/ 250 OVERRIDE (IP): Performed by: HOSPITALIST

## 2019-01-01 PROCEDURE — 99214 OFFICE O/P EST MOD 30 MIN: CPT | Performed by: PSYCHIATRY & NEUROLOGY

## 2019-01-01 PROCEDURE — 85025 COMPLETE CBC W/AUTO DIFF WBC: CPT

## 2019-01-01 PROCEDURE — 700111 HCHG RX REV CODE 636 W/ 250 OVERRIDE (IP): Performed by: EMERGENCY MEDICINE

## 2019-01-01 PROCEDURE — 90935 HEMODIALYSIS ONE EVALUATION: CPT

## 2019-01-01 PROCEDURE — 83550 IRON BINDING TEST: CPT

## 2019-01-01 PROCEDURE — 99215 OFFICE O/P EST HI 40 MIN: CPT | Performed by: INTERNAL MEDICINE

## 2019-01-01 PROCEDURE — 81001 URINALYSIS AUTO W/SCOPE: CPT

## 2019-01-01 PROCEDURE — 82728 ASSAY OF FERRITIN: CPT

## 2019-01-01 PROCEDURE — 83735 ASSAY OF MAGNESIUM: CPT

## 2019-01-01 PROCEDURE — 700105 HCHG RX REV CODE 258: Performed by: EMERGENCY MEDICINE

## 2019-01-01 RX ORDER — OXYCODONE HYDROCHLORIDE 10 MG/1
20 TABLET ORAL EVERY 6 HOURS PRN
Status: DISCONTINUED | OUTPATIENT
Start: 2019-01-01 | End: 2019-01-09 | Stop reason: HOSPADM

## 2019-01-01 RX ORDER — PROMETHAZINE HYDROCHLORIDE 25 MG/1
25 TABLET ORAL EVERY 8 HOURS PRN
COMMUNITY
End: 2019-04-04 | Stop reason: SDUPTHER

## 2019-01-01 RX ORDER — ASCORBIC ACID 500 MG
500 TABLET ORAL DAILY
Status: DISCONTINUED | OUTPATIENT
Start: 2019-01-01 | End: 2019-01-09 | Stop reason: HOSPADM

## 2019-01-01 RX ORDER — LEVETIRACETAM 500 MG/1
1000 TABLET ORAL DAILY
Status: DISCONTINUED | OUTPATIENT
Start: 2019-01-01 | End: 2019-01-01

## 2019-01-01 RX ORDER — HEPARIN SODIUM 5000 [USP'U]/ML
5000 INJECTION, SOLUTION INTRAVENOUS; SUBCUTANEOUS EVERY 8 HOURS
Status: DISCONTINUED | OUTPATIENT
Start: 2019-01-01 | End: 2019-01-09 | Stop reason: HOSPADM

## 2019-01-01 RX ORDER — PROMETHAZINE HYDROCHLORIDE 25 MG/1
12.5-25 SUPPOSITORY RECTAL EVERY 4 HOURS PRN
Status: DISCONTINUED | OUTPATIENT
Start: 2019-01-01 | End: 2019-01-09 | Stop reason: HOSPADM

## 2019-01-01 RX ORDER — CLONIDINE HYDROCHLORIDE 0.1 MG/1
0.1 TABLET ORAL EVERY 6 HOURS PRN
Status: DISCONTINUED | OUTPATIENT
Start: 2019-01-01 | End: 2019-01-09 | Stop reason: HOSPADM

## 2019-01-01 RX ORDER — TIZANIDINE 4 MG/1
8 TABLET ORAL
Status: DISCONTINUED | OUTPATIENT
Start: 2019-01-01 | End: 2019-01-01

## 2019-01-01 RX ORDER — ONDANSETRON 2 MG/ML
4 INJECTION INTRAMUSCULAR; INTRAVENOUS EVERY 4 HOURS PRN
Status: DISCONTINUED | OUTPATIENT
Start: 2019-01-01 | End: 2019-01-09 | Stop reason: HOSPADM

## 2019-01-01 RX ORDER — POLYETHYLENE GLYCOL 3350 17 G/17G
1 POWDER, FOR SOLUTION ORAL
Status: DISCONTINUED | OUTPATIENT
Start: 2019-01-01 | End: 2019-01-09 | Stop reason: HOSPADM

## 2019-01-01 RX ORDER — PROMETHAZINE HYDROCHLORIDE 25 MG/1
12.5-25 TABLET ORAL EVERY 4 HOURS PRN
Status: DISCONTINUED | OUTPATIENT
Start: 2019-01-01 | End: 2019-01-09 | Stop reason: HOSPADM

## 2019-01-01 RX ORDER — PREGABALIN 25 MG/1
100 CAPSULE ORAL EVERY EVENING
Status: DISCONTINUED | OUTPATIENT
Start: 2019-01-01 | End: 2019-01-01

## 2019-01-01 RX ORDER — SEVELAMER CARBONATE 800 MG/1
2400 TABLET, FILM COATED ORAL
Status: DISCONTINUED | OUTPATIENT
Start: 2019-01-01 | End: 2019-01-09 | Stop reason: HOSPADM

## 2019-01-01 RX ORDER — OMEPRAZOLE 20 MG/1
20 CAPSULE, DELAYED RELEASE ORAL DAILY
Status: DISCONTINUED | OUTPATIENT
Start: 2019-01-01 | End: 2019-01-09 | Stop reason: HOSPADM

## 2019-01-01 RX ORDER — ENALAPRILAT 1.25 MG/ML
1.25 INJECTION INTRAVENOUS EVERY 6 HOURS PRN
Status: DISCONTINUED | OUTPATIENT
Start: 2019-01-01 | End: 2019-01-09 | Stop reason: HOSPADM

## 2019-01-01 RX ORDER — ACETAMINOPHEN 325 MG/1
650 TABLET ORAL EVERY 6 HOURS PRN
Status: DISCONTINUED | OUTPATIENT
Start: 2019-01-01 | End: 2019-01-09 | Stop reason: HOSPADM

## 2019-01-01 RX ORDER — PREGABALIN 100 MG/1
100 CAPSULE ORAL 2 TIMES DAILY
Status: DISCONTINUED | OUTPATIENT
Start: 2019-01-01 | End: 2019-01-01

## 2019-01-01 RX ORDER — TRAZODONE HYDROCHLORIDE 50 MG/1
50 TABLET ORAL
Status: DISCONTINUED | OUTPATIENT
Start: 2019-01-01 | End: 2019-01-09 | Stop reason: HOSPADM

## 2019-01-01 RX ORDER — FERROUS SULFATE 325(65) MG
325 TABLET ORAL DAILY
Status: DISCONTINUED | OUTPATIENT
Start: 2019-01-01 | End: 2019-01-09 | Stop reason: HOSPADM

## 2019-01-01 RX ORDER — PROMETHAZINE HYDROCHLORIDE 25 MG/1
25 TABLET ORAL EVERY 8 HOURS PRN
Status: DISCONTINUED | OUTPATIENT
Start: 2019-01-01 | End: 2019-01-01

## 2019-01-01 RX ORDER — LIDOCAINE 50 MG/G
1 PATCH TOPICAL EVERY 24 HOURS
Status: DISCONTINUED | OUTPATIENT
Start: 2019-01-01 | End: 2019-01-09 | Stop reason: HOSPADM

## 2019-01-01 RX ORDER — BISACODYL 10 MG
10 SUPPOSITORY, RECTAL RECTAL
Status: DISCONTINUED | OUTPATIENT
Start: 2019-01-01 | End: 2019-01-09 | Stop reason: HOSPADM

## 2019-01-01 RX ORDER — SODIUM CHLORIDE 9 MG/ML
INJECTION, SOLUTION INTRAVENOUS
Status: COMPLETED
Start: 2019-01-01 | End: 2019-01-01

## 2019-01-01 RX ORDER — AMOXICILLIN 250 MG
2 CAPSULE ORAL 2 TIMES DAILY
Status: DISCONTINUED | OUTPATIENT
Start: 2019-01-01 | End: 2019-01-09 | Stop reason: HOSPADM

## 2019-01-01 RX ORDER — ONDANSETRON 4 MG/1
4 TABLET, ORALLY DISINTEGRATING ORAL EVERY 4 HOURS PRN
Status: DISCONTINUED | OUTPATIENT
Start: 2019-01-01 | End: 2019-01-09 | Stop reason: HOSPADM

## 2019-01-01 RX ORDER — LACOSAMIDE 50 MG/1
100 TABLET ORAL 2 TIMES DAILY
Status: DISCONTINUED | OUTPATIENT
Start: 2019-01-01 | End: 2019-01-09 | Stop reason: HOSPADM

## 2019-01-01 RX ORDER — HYDROXYCHLOROQUINE SULFATE 200 MG/1
200 TABLET, FILM COATED ORAL
Status: DISCONTINUED | OUTPATIENT
Start: 2019-01-01 | End: 2019-01-09 | Stop reason: HOSPADM

## 2019-01-01 RX ADMIN — Medication 325 MG: at 10:07

## 2019-01-01 RX ADMIN — CEFTRIAXONE SODIUM 2 G: 2 INJECTION, POWDER, FOR SOLUTION INTRAMUSCULAR; INTRAVENOUS at 07:49

## 2019-01-01 RX ADMIN — OMEPRAZOLE 20 MG: 20 CAPSULE, DELAYED RELEASE ORAL at 10:07

## 2019-01-01 RX ADMIN — LACOSAMIDE 100 MG: 50 TABLET, FILM COATED ORAL at 10:07

## 2019-01-01 RX ADMIN — OXYCODONE HYDROCHLORIDE 20 MG: 10 TABLET ORAL at 10:07

## 2019-01-01 RX ADMIN — LIDOCAINE 1 PATCH: 50 PATCH TOPICAL at 10:12

## 2019-01-01 RX ADMIN — OXYCODONE HYDROCHLORIDE 20 MG: 10 TABLET ORAL at 17:56

## 2019-01-01 RX ADMIN — TRAZODONE HYDROCHLORIDE 50 MG: 50 TABLET ORAL at 21:31

## 2019-01-01 RX ADMIN — SEVELAMER CARBONATE 2400 MG: 800 TABLET, FILM COATED ORAL at 17:54

## 2019-01-01 RX ADMIN — HEPARIN SODIUM 5000 UNITS: 5000 INJECTION, SOLUTION INTRAVENOUS; SUBCUTANEOUS at 21:31

## 2019-01-01 RX ADMIN — MEROPENEM 500 MG: 500 INJECTION, POWDER, FOR SOLUTION INTRAVENOUS at 10:09

## 2019-01-01 RX ADMIN — HYDROXYCHLOROQUINE SULFATE 200 MG: 200 TABLET, FILM COATED ORAL at 21:32

## 2019-01-01 RX ADMIN — LACOSAMIDE 100 MG: 50 TABLET, FILM COATED ORAL at 17:56

## 2019-01-01 RX ADMIN — SODIUM CHLORIDE 500 ML: 9 INJECTION, SOLUTION INTRAVENOUS at 10:18

## 2019-01-01 RX ADMIN — MEROPENEM 500 MG: 500 INJECTION, POWDER, FOR SOLUTION INTRAVENOUS at 21:31

## 2019-01-01 RX ADMIN — OXYCODONE HYDROCHLORIDE AND ACETAMINOPHEN 500 MG: 500 TABLET ORAL at 10:07

## 2019-01-01 ASSESSMENT — ENCOUNTER SYMPTOMS
CHILLS: 0
WHEEZING: 0
DEPRESSION: 1
NAUSEA: 0
NERVOUS/ANXIOUS: 0
WEAKNESS: 1
HEMOPTYSIS: 0
VOMITING: 0
CONSTIPATION: 0
BLOOD IN STOOL: 0
DOUBLE VISION: 0
GASTROINTESTINAL NEGATIVE: 1
MEMORY LOSS: 0
FEVER: 0
LOSS OF CONSCIOUSNESS: 0
EYES NEGATIVE: 1
ABDOMINAL PAIN: 0
HEADACHES: 0
PALPITATIONS: 0
DIAPHORESIS: 0
HEARTBURN: 0
BRUISES/BLEEDS EASILY: 0
DIARRHEA: 0
DIZZINESS: 0
SHORTNESS OF BREATH: 1
COUGH: 0
FOCAL WEAKNESS: 0
CARDIOVASCULAR NEGATIVE: 1
SEIZURES: 1
BACK PAIN: 1

## 2019-01-01 ASSESSMENT — LIFESTYLE VARIABLES
SUBSTANCE_ABUSE: 0
EVER_SMOKED: YES
ALCOHOL_USE: NO
DO YOU DRINK ALCOHOL: NO

## 2019-01-01 ASSESSMENT — COGNITIVE AND FUNCTIONAL STATUS - GENERAL
MOBILITY SCORE: 14
STANDING UP FROM CHAIR USING ARMS: A LOT
TOILETING: TOTAL
MOVING TO AND FROM BED TO CHAIR: A LITTLE
CLIMB 3 TO 5 STEPS WITH RAILING: TOTAL
MOVING FROM LYING ON BACK TO SITTING ON SIDE OF FLAT BED: A LITTLE
DRESSING REGULAR LOWER BODY CLOTHING: A LITTLE
WALKING IN HOSPITAL ROOM: TOTAL
SUGGESTED CMS G CODE MODIFIER MOBILITY: CL
SUGGESTED CMS G CODE MODIFIER DAILY ACTIVITY: CK
DAILY ACTIVITIY SCORE: 18
DRESSING REGULAR UPPER BODY CLOTHING: A LITTLE
HELP NEEDED FOR BATHING: A LITTLE

## 2019-01-01 ASSESSMENT — PAIN SCALES - GENERAL
PAINLEVEL_OUTOF10: 0
PAINLEVEL_OUTOF10: 8
PAINLEVEL_OUTOF10: 8

## 2019-01-01 NOTE — ED TRIAGE NOTES
"Debby Carrizales  38 y.o. female  Chief Complaint   Patient presents with   • Other       Pt BIB EMS for above CC.   Pt reports to the ER with numerous complaints. Pt states she was recently admitted to SICU for seizure disorder. Since getting discharged pt reports worsening lower back \"nerve pain\", \"arm twitching\" and symptoms of a possible \"lupus flare up.\" Pt says her fingers and joints have felt swollen, and her lips have become chapped/have sores. Pt states this characteristic when she is having a flare up.   Pt is alert and oriented, speaking in full sentences, follows commands and responds appropriately to questions. NAD. Resp are even and unlabored.     Hx: ESRD, dialysis (M,W,F), lupus     NIBP: 154/102, NIBP MAP (Calculated): 122, Heart Rate (Monitored): 87, Pulse: 85, Respiration: 18, Temperature: 36.9 °C (98.4 °F), Height: 165.1 cm (5' 5\"), Pulse Oximetry: 99 %, O2 (LPM): 0, O2 Delivery: None (Room Air)  "

## 2019-01-01 NOTE — PROGRESS NOTES
"Pt displays signs of cognitive deficits  Asked me to repeat questions multiple times and answered then said the same statement repeatedly until I asked another question.    States \"i feel like I answered you but then I don't know if I did or not\"  "

## 2019-01-01 NOTE — ASSESSMENT & PLAN NOTE
Very flat affect.  Continue abilify  Psychiatry following.  Will need continued outpatient psychiatry.

## 2019-01-01 NOTE — PROGRESS NOTES
[facility]  States she cannot walk  2 person assist to stand up scale  Scooted from [facility] to hospital bed  Tremors noted  --------------------------------------------------------------    2 RN skin check complete:    L chest port  Fistula to R upper arm, bruit and thrill present  Generalized dry skin dry   Scabbing or chest and lips  No other areas of redness or concern noted

## 2019-01-01 NOTE — H&P
Hospital Medicine History & Physical Note    Date of Service  1/1/2019    Primary Care Physician  Sushila Manzano M.D.    Consultants  Nephrology, neurology    Code Status  Full code    Chief Complaint  Weakness with low back pain    History of Presenting Illness  38 y.o. female who presented 1/1/2019 with missing dialysis due to weakness and low back pain.  The patient was recently admitted in the beginning of December 2018 and discharged 1 week before [place] for new onset of seizure disorder.  The patient was placed on Vimpat as well as Keppra.  The patient since then however has been having worsening back pain and has not been able to get out of bed.  The patient so has been missing dialysis and her renal functions have worsened.  The patient has end-stage renal disease secondary to systematic lupus erythematosus.  At this point in time the patient will be admitted to the hospital for acute dialysis.  We will try to control her pain and evaluate her low back with an MRI study using anesthesia due to claustrophobia.  The patient at this point will be valid by physical therapy and occupational therapy and I have asked case management assistance with placing the patient to an extended care facility for rehab.  The patient was agreeable to go for some rehab.  In the meantime the patient's chronic conditions will be optimized and we will reevaluate her seizure disorder as the patient says she still having seizures.  I have asked neurology and nephrology to consult.    Review of Systems  Review of Systems   Constitutional: Positive for malaise/fatigue. Negative for chills, diaphoresis and fever.   HENT: Negative.    Eyes: Negative.  Negative for double vision.   Respiratory: Positive for shortness of breath. Negative for cough, hemoptysis and wheezing.    Cardiovascular: Negative.  Negative for chest pain, palpitations and leg swelling.   Gastrointestinal: Negative.  Negative for abdominal pain, blood in stool,  constipation, diarrhea, heartburn, nausea and vomiting.   Genitourinary: Negative.  Negative for frequency, hematuria and urgency.   Musculoskeletal: Positive for back pain. Negative for joint pain.   Skin: Negative.  Negative for itching and rash.   Neurological: Positive for seizures and weakness. Negative for dizziness, focal weakness, loss of consciousness and headaches.   Endo/Heme/Allergies: Negative.  Does not bruise/bleed easily.   Psychiatric/Behavioral: Positive for depression. Negative for memory loss, substance abuse and suicidal ideas. The patient is not nervous/anxious.    All other systems reviewed and are negative.      Past Medical History   has a past medical history of Arthritis; Avascular necrosis of bones of both hips (HCC) (10/10/2016); Clostridium difficile colitis (5/3/2011); Dialysis patient; ESBL (extended spectrum beta-lactamase) producing bacteria infection (8/25/2014); Fibromyalgia; Hypertension; Lupus; Pneumonia (); Psychiatric disorder; Pyelonephritis (11/21/2017); and Renal failure. She also has no past medical history of Chronic airway obstruction, not elsewhere classified or Fall.    Surgical History   has a past surgical history that includes gastroscopy-endo (4/10/2011); sclerotheraphy (4/10/2011); gastroscopy-endo (4/18/2011); colonoscopy with biopsy (4/20/2011); gastroscopy-endo (4/27/2011); other (5/2011); other abdominal surgery; av fistula creation (9/9/2014); cath placement (9/9/2014); av fistula creation (11/14/2014); av fistula creation (2/3/2015); hip arthroplasty total (Right, 1/18/2016); closed reduction (Right, 7/5/2016); av fistula creation (Right, 7/12/2016); thrombectomy (Right, 8/20/2016); thrombectomy (Right, 8/21/2016); angioplasty balloon (8/21/2016); tracheostomy; av fistula creation (Right, 12/9/2017); thrombectomy (12/9/2017); av fistula revision (Right, 8/11/2018); and av fistula thrombolysis (Right, 8/11/2018).     Family History  family history  includes Cancer in her father; Heart Disease in her mother.     Social History   reports that she quit smoking about 7 years ago. Her smoking use included Cigarettes. She has a 9.00 pack-year smoking history. She has never used smokeless tobacco. She reports that she does not drink alcohol or use drugs.    Allergies  Allergies   Allergen Reactions   • Lorazepam [Ativan] Anxiety     Patient becomes severely paranoid and agitated   • Morphine Itching     Tolerates Dilaudid   • Seasonal Runny Nose and Itching     Hay fever, sbaiha brush       Medications  Prior to Admission Medications   Prescriptions Last Dose Informant Patient Reported? Taking?   LYRICA 100 MG Cap 12/31/2018 at 1500 Patient Yes No   Sig: TAKE 1 CAPSULE BY MOUTH TWICE DAILY   Lacosamide (VIMPAT) 100 MG Tab 12/31/2018 at AM Patient No No   Sig: Take 100 mg by mouth 2 Times a Day for 30 days.   Oxycodone HCl 20 MG Tab 12/31/2018 at 1500 Patient No No   Sig: Take 1 Tab by mouth every 6 hours as needed (Take up to three a day as needed and one additional pill after dialysis (3 times a week)) for up to 28 days.   ascorbic acid (ASCORBIC ACID) 500 MG Tab 12/31/2018 at AM Patient Yes No   Sig: Take 500 mg by mouth every day.   cholecalciferol (VITAMIN D3) 5000 UNIT Cap 12/31/2018 at AM Patient Yes No   Sig: Take 10,000 Units by mouth every day.   ferrous sulfate 325 (65 FE) MG tablet 12/31/2018 at AM Patient Yes No   Sig: Take 325 mg by mouth every day.   hydroxychloroquine (PLAQUENIL) 200 MG Tab 12/30/2018 at HS Patient Yes No   Sig: Take 200 mg by mouth every bedtime.   levETIRAcetam (KEPPRA) 1000 MG tablet 12/28/2018 at AM Patient No No   Sig: Take 1 Tab by mouth every day. Take after hemodialysis   lidocaine (LIDODERM) 5 % Patch 12/30/2018 at 1600 Patient No No   Sig: Apply 1 Patch to skin as directed every 24 hours.   omeprazole (PRILOSEC) 20 MG delayed-release capsule 12/31/2018 at AM Patient No No   Sig: Take 1 Cap by mouth every day.   promethazine  (PHENERGAN) 25 MG Tab 12/31/2018 at 1500 Patient Yes Yes   Sig: Take 25 mg by mouth every 8 hours as needed for Nausea/Vomiting.   sevelamer carbonate (RENVELA) 800 MG Tab tablet 12/29/2018 Patient Yes No   Sig: Take 2,400 mg by mouth 3 times a day, with meals.   tizanidine (ZANAFLEX) 4 MG Tab 12/30/2018 at HS Patient No No   Sig: Take 2 Tabs by mouth every bedtime.   trazodone (DESYREL) 50 MG Tab 12/30/2018 at HS Patient No No   Sig: Take 1 Tab by mouth every bedtime.      Facility-Administered Medications: None       Physical Exam  Temp:  [36.9 °C (98.4 °F)] 36.9 °C (98.4 °F)  Pulse:  [81-91] 86  Resp:  [15-21] 19    Physical Exam   Constitutional: She is oriented to person, place, and time. She appears well-developed and well-nourished.   HENT:   Head: Normocephalic and atraumatic.   Right Ear: External ear normal.   Left Ear: External ear normal.   Nose: Nose normal.   Mouth/Throat: Oropharynx is clear and moist.   Eyes: Pupils are equal, round, and reactive to light. Conjunctivae and EOM are normal.   Neck: Normal range of motion. Neck supple. No JVD present. No thyromegaly present.   Cardiovascular: Normal rate and regular rhythm.    No murmur heard.  Pulmonary/Chest: She has no wheezes. She has no rales. She exhibits no tenderness.   Abdominal: She exhibits no distension and no mass. There is no tenderness. There is no rebound and no guarding.   Musculoskeletal: Normal range of motion. She exhibits no edema or tenderness.   Right upper extremity has a AV fistula that at this point has appropriate bruit.   Lymphadenopathy:     She has no cervical adenopathy.   Neurological: She is alert and oriented to person, place, and time. She has normal reflexes. No cranial nerve deficit.   Skin: Skin is warm and dry. No rash noted. No erythema.   Psychiatric: She has a normal mood and affect.   Nursing note and vitals reviewed.      Laboratory:  Recent Labs      01/01/19   0645   WBC  6.2   RBC  2.58*   HEMOGLOBIN  7.8*    HEMATOCRIT  24.0*   MCV  93.0   MCH  30.2   MCHC  32.5*   RDW  45.9   PLATELETCT  154*   MPV  10.6     Recent Labs      01/01/19   0645   SODIUM  138   POTASSIUM  5.2   CHLORIDE  100   CO2  16*   GLUCOSE  88   BUN  96*   CREATININE  15.98*   CALCIUM  8.6     Recent Labs      01/01/19   0645   ALTSGPT  <5   ASTSGOT  5*   ALKPHOSPHAT  63   TBILIRUBIN  0.5   LIPASE  22   GLUCOSE  88                 No results for input(s): TROPONINI in the last 72 hours.    Urinalysis:    Recent Labs      01/01/19   0705   SPECGRAVITY  1.011   GLUCOSEUR  250*   KETONES  Negative   NITRITE  Negative   LEUKESTERAS  Trace*   WBCURINE  20-50*   RBCURINE  0-2   BACTERIA  Negative   EPITHELCELL  Negative        Imaging:  MR-LUMBAR SPINE-W/O    (Results Pending)         Assessment/Plan:  I anticipate this patient is appropriate for observation status at this time.    Medical non-compliance- (present on admission)   Assessment & Plan    Patient at this point has been counseled on compliance she says she does not want to be noncompliant on purpose.     ESRD (end stage renal disease) on dialysis ([facility])- (present on admission)   Assessment & Plan    Patient has not been able to attend his dialysis due to the fact that she has worsening back pain that prevented her from getting out of bed.  At this point her creatinine is elevated to 15.98 with a BUN of 96 and a bicarb of 16.  I have consulted nephrology and they will get her on the schedule for dialysis right away.  The patient's debility and back pain at this point prevents her from going home immediately and doing dialysis that she will need at this point a skilled nursing facility for rehab prior to going home.  I have spoken with the  who at this point has researched that she does have at this point a qualifying stay from her recent ICU admission and thus can be sent to an extended care facility.     Failure to thrive in adult   Assessment & Plan    PT OT evaluation and skilled  placement will be needed     Hypertension- (present on admission)   Assessment & Plan    Continue with blood pressure management optimization keep systolic blood pressure under 140 diastolic under 90     Lupus (systemic lupus erythematosus) ([facility])- (present on admission)   Assessment & Plan    SLE currently under control continue at this point with pain management     Chronic lupus nephritis (HCC)- (present on admission)   Assessment & Plan    Continue with monitoring at this point for further deterioration and have PT OT evaluation done     DAVI (obstructive sleep apnea)- (present on admission)   Assessment & Plan    Patient will benefit from nighttime oxygen support.     Anemia in chronic kidney disease, on chronic dialysis ([facility])   Assessment & Plan    Check at this point iron panel, B12 level, folic acid level.     Chronic pain disorder- (present on admission)   Assessment & Plan    Continue with pain management optimization.  Obtain an MRI of the lower back with anesthesia due to patient's claustrophobia.  This will determine if the patient does have some sort of necessity for intervention of her low back as she has not been able to get out of bed due to the low back pain.     Obesity (BMI 30-39.9)- (present on admission)   Assessment & Plan    Patient will need outpatient weight loss management program including bariatric clinic evaluation.     Depression- (present on admission)   Assessment & Plan    Chronic but seems to be controlled.     Low back pain- (present on admission)   Assessment & Plan    Evaluate with MRI using anesthesia due to claustrophobia.  The back pain at this point is exacerbated compared to previous status.     Thrombocytopenia (CMS-HCC)- (present on admission)   Assessment & Plan    Chronic, stable, patient currently is not bleeding.  She does not have any petechiae.  This is most likely secondary to the SLE.     Anxiety- (present on admission)   Assessment & Plan    Patient has chronic  anxiety she is apparently allergic to Ativan though.     Fibromyalgia- (present on admission)   Assessment & Plan    Continue with pain management optimization.         VTE prophylaxis: Heparin

## 2019-01-01 NOTE — CONSULTS
CC: Seizures    Date of Admission: 1/1/2019    Today's Date: 01/01/19    Consulting Physician: Lyndon Luis MD      HPI:    Debby Carrizales is a 38 y.o. female ; who was diagnosed with status epilepticus back on December 13.  At that time she had been admitted for end-stage renal disease dialysis and was noticed with progressive lethargy for which she got in EEG which revealed status epilepticus, the patient was treated with Keppra and Vimpat with improvement of her lethargy and was supposed to be followed with outpatient neurology.  Patient got an MRI on December 15 that showed nonspecific white matter changes nothing to justify the presence of a status epilepticus.    Patient has been readmitted through the emergency room due to pyuria and worsening weakness and back pain and neurology has been consulted due to history of seizures.    When interrogated about her possible seizures patient is a terrible historian.  She thinks that she may have had a seizure this morning because she gets the shakes but she cannot explain what that she means by she got the shaking.  She also cannot tell me what medication she is taking for her seizures and or how she is taking them.  All what she does is complaining about back pain and generalized weakness.  She is supposed to be follow with outpatient neurology but she has not set up an appointment yet      ROS:   Unable to obtain.  The patient does not participate, and minimally verbalize      Past Medical History:   Past Medical History:   Diagnosis Date   • Pyelonephritis 11/21/2017   • Avascular necrosis of bones of both hips (HCC) 10/10/2016   • ESBL (extended spectrum beta-lactamase) producing bacteria infection 8/25/2014   • Pneumonia    • Clostridium difficile colitis 5/3/2011   • Arthritis     all joints,r/t lupus   • Dialysis patient    • Fibromyalgia    • Hypertension    • Lupus    • Psychiatric disorder     anxiety, depression   • Renal failure         Past Surgical History:   Past Surgical History:   Procedure Laterality Date   • AV FISTULA REVISION Right 8/11/2018    Procedure: AV FISTULA REVISION- Graft and Thrombectomy;  Surgeon: Shabbir Ardon M.D.;  Location: [facility];  Service: General   • AV FISTULA THROMBOLYSIS Right 8/11/2018    Procedure: AV FISTULA THROMBOLYSIS;  Surgeon: Shabbir Ardon M.D.;  Location: [facility];  Service: General   • AV FISTULA CREATION Right 12/9/2017    Procedure: AV FISTULA CREATION-ARM FOR GRAFT;  Surgeon: Lesly Jansen M.D.;  Location: [facility];  Service: General   • THROMBECTOMY  12/9/2017    Procedure: THROMBECTOMY;  Surgeon: Lesly Jansen M.D.;  Location: [facility];  Service: General   • THROMBECTOMY Right 8/21/2016    Procedure: THROMBECTOMY - right AV fistula graft with grams;  Surgeon: Shabbir Ardon M.D.;  Location: [facility];  Service:    • ANGIOPLASTY BALLOON  8/21/2016    Procedure: ANGIOPLASTY BALLOON;  Surgeon: Shabbir Ardon M.D.;  Location: [facility];  Service:    • THROMBECTOMY Right 8/20/2016    Procedure: THROMBECTOMY AV GRAFT;  Surgeon: Shabbir Ardon M.D.;  Location: [facility];  Service:    • AV FISTULA CREATION Right 7/12/2016    Procedure: AV FISTULA CREATION WITH GRAFT BRACHIAL AXILLARY;  Surgeon: Shabbir Ardon M.D.;  Location: [facility];  Service:    • CLOSED REDUCTION Right 7/5/2016    Procedure: CLOSED REDUCTION- Hip ;  Surgeon: Michael Holman M.D.;  Location: [facility];  Service:    • HIP ARTHROPLASTY TOTAL Right 1/18/2016    Procedure: HIP ARTHROPLASTY TOTAL;  Surgeon: Michael Holman M.D.;  Location: [facility];  Service:    • AV FISTULA CREATION  2/3/2015    Performed by Shabbir Ardon M.D. at [facility]   • AV FISTULA CREATION  11/14/2014    Performed by Shabbir Ardon M.D. at [facility]   • AV  FISTULA CREATION  9/9/2014    Performed by Shabbir Ardon M.D. at SURGERY [facility]   • CATH PLACEMENT  9/9/2014    Performed by Shabbir Ardon M.D. at SURGERY [facility]   • OTHER  5/2011    tracheostomy   • GASTROSCOPY-ENDO  4/27/2011    Performed by PALMIRA DOAN at ENDOSCOPY [facility]   • COLONOSCOPY WITH BIOPSY  4/20/2011    Performed by ALCIRA CRUZ at ENDOSCOPY [facility]   • GASTROSCOPY-ENDO  4/18/2011    Performed by ALCIRA CRUZ at ENDOSCOPY [facility]   • GASTROSCOPY-ENDO  4/10/2011    Performed by SYED JURADO at ENDOSCOPY [facility]   • SCLEROTHERAPHY  4/10/2011    Performed by SYED JURADO at ENDOSCOPY [facility]   • OTHER ABDOMINAL SURGERY      kidney biopsy   • TRACHEOSTOMY         Social History:   Social History     Social History   • Marital status: Single     Spouse name: N/A   • Number of children: N/A   • Years of education: N/A     Occupational History   • Not on file.     Social History Main Topics   • Smoking status: Former Smoker     Packs/day: 0.50     Years: 18.00     Types: Cigarettes     Quit date: 6/13/2011   • Smokeless tobacco: Never Used      Comment: 1/2 ppd   • Alcohol use No   • Drug use: No   • Sexual activity: Not on file     Other Topics Concern   •  Service No   • Blood Transfusions Yes   • Caffeine Concern No   • Occupational Exposure No   • Hobby Hazards No   • Sleep Concern Yes   • Stress Concern Yes   • Weight Concern Yes   • Special Diet Yes   • Back Care No   • Exercise Yes   • Bike Helmet No   • Seat Belt Yes   • Self-Exams Yes     Social History Narrative   • No narrative on file       Family History:   Family History   Problem Relation Age of Onset   • Heart Disease Mother    • Cancer Father        Allergies:   Allergies   Allergen Reactions   • Lorazepam [Ativan] Anxiety     Patient becomes severely paranoid and agitated   • Morphine Itching     Tolerates Dilaudid   • Seasonal Runny  Nose and Itching     Hay fever, sabiha brush         Current Facility-Administered Medications:   •  ascorbic acid tablet 500 mg, 500 mg, Oral, DAILY, Toby Leiva M.D., 500 mg at 01/01/19 1007  •  vitamin D (cholecalciferol) tablet 10,000 Units, 10,000 Units, Oral, DAILY, Toby Leiva M.D.  •  ferrous sulfate tablet 325 mg, 325 mg, Oral, DAILY, Toby Leiva M.D., 325 mg at 01/01/19 1007  •  hydroxychloroquine (PLAQUENIL) tablet 200 mg, 200 mg, Oral, QHS, Toby Leiva M.D.  •  lacosamide (VIMPAT) tablet 100 mg, 100 mg, Oral, BID, Toby Leiva M.D., 100 mg at 01/01/19 1007  •  levETIRAcetam (KEPPRA) tablet 1,000 mg, 1,000 mg, Oral, DAILY, Toby Leiva M.D.  •  lidocaine (LIDODERM) 5 % 1 Patch, 1 Patch, Transdermal, Q24HRS, Toby Leiva M.D., 1 Patch at 01/01/19 1012  •  omeprazole (PRILOSEC) capsule 20 mg, 20 mg, Oral, DAILY, Toby Leiva M.D., 20 mg at 01/01/19 1007  •  oxyCODONE immediate release (ROXICODONE) tablet 20 mg, 20 mg, Oral, Q6HRS PRN, Toby Leiva M.D., 20 mg at 01/01/19 1007  •  sevelamer carbonate (RENVELA) tablet 2,400 mg, 2,400 mg, Oral, TID WITH MEALS, Toby Leiva M.D.  •  tizanidine (ZANAFLEX) tablet 8 mg, 8 mg, Oral, QHS, Toby Leiva M.D.  •  traZODone (DESYREL) tablet 50 mg, 50 mg, Oral, QHS, Toby Leiva M.D.  •  senna-docusate (PERICOLACE or SENOKOT S) 8.6-50 MG per tablet 2 Tab, 2 Tab, Oral, BID **AND** polyethylene glycol/lytes (MIRALAX) PACKET 1 Packet, 1 Packet, Oral, QDAY PRN **AND** [DISCONTINUED] magnesium hydroxide (MILK OF MAGNESIA) suspension 30 mL, 30 mL, Oral, QDAY PRN **AND** bisacodyl (DULCOLAX) suppository 10 mg, 10 mg, Rectal, QDAY PRN, Toby Leiva M.D.  •  heparin injection 5,000 Units, 5,000 Units, Subcutaneous, Q8HRS, Toby Leiva M.D.  •  cloNIDine (CATAPRES) tablet 0.1 mg, 0.1 mg, Oral, Q6HRS PRN, Toby Leiva M.D.  •  enalaprilat (VASOTEC) injection 1.25 mg, 1.25 mg, Intravenous, Q6HRS PRN, Toby Leiva M.D.  •  ondansetron (ZOFRAN)  "syringe/vial injection 4 mg, 4 mg, Intravenous, Q4HRS PRN, Toby Leiva M.D.  •  ondansetron (ZOFRAN ODT) dispertab 4 mg, 4 mg, Oral, Q4HRS PRN, Toby Leiva M.D.  •  promethazine (PHENERGAN) tablet 12.5-25 mg, 12.5-25 mg, Oral, Q4HRS PRN, Toby Leiva M.D.  •  promethazine (PHENERGAN) suppository 12.5-25 mg, 12.5-25 mg, Rectal, Q4HRS PRN, Toby Leiva M.D.  •  prochlorperazine (COMPAZINE) injection 5-10 mg, 5-10 mg, Intravenous, Q4HRS PRN, Toby Leiva M.D.  •  acetaminophen (TYLENOL) tablet 650 mg, 650 mg, Oral, Q6HRS PRN, Toby Leiva M.D.  •  meropenem (MERREM) 500 mg in  mL IVPB, 500 mg, Intravenous, QHS, Toby Leiva M.D., Stopped at 01/01/19 1039  •  pregabalin (LYRICA) capsule 100 mg, 100 mg, Oral, Q EVENING, Toby Leiva M.D.      PHYSICAL EXAM    Vitals:    01/01/19 0700 01/01/19 0800 01/01/19 0900 01/01/19 0940   Pulse:  91 86    Resp:  17 19    Temp:       TempSrc:       SpO2:  93% 91%    Weight: 86 kg (189 lb 9.5 oz)   80.2 kg (176 lb 12.9 oz)   Height: 1.651 m (5' 5\")          Constitutional: Lethargic, avoid eye contact.  With myoclonic jerking  Getting dialysis    Head/Neck: NCAT. no meningismus neg kernig neg brudzinski. No obvious mass or heard bruit. No tender arteries or lost pulses. No rash of head or neck.  Pale, with skin picking in the face and arms    Cardiovascular: Tachycardic; Regular, rhythmic    Pulmonary: Normal breath sounds    Extremities: Normal inspection, No edema, normal pulses    Skin: Warm, dry, intact.   Eyes/Funduscopic: Normal conjunctiva    Psych: Depressed mood    Mental Status: Very lethargic mild dysarthric speech oriented in person and place    Cranial Nerves: CN II-XII intact. Pupillary reflex + 4 No afferent pupillary defect. EOM full;  VF full; No nystagmus.       Motor:  5/5,      Sensory: symmetric to all modalities.  Coordination: dysmetria absent;     Movements Abnormal : Negative myoclonic jerking is noted in all 4 extremities predominantly " in the upper extremities    DTR's: + ;  no clonus.    Babinski: neg    Gait/Station: n/a fall concern             Labs:  Recent Labs      01/01/19   0645   WBC  6.2   RBC  2.58*   HEMOGLOBIN  7.8*   HEMATOCRIT  24.0*   MCV  93.0   MCH  30.2   MCHC  32.5*   RDW  45.9   PLATELETCT  154*   MPV  10.6     Recent Labs      01/01/19   0645   SODIUM  138   POTASSIUM  5.2   CHLORIDE  100   CO2  16*   GLUCOSE  88   BUN  96*   CREATININE  15.98*   CALCIUM  8.6                     Recent Labs      01/01/19   0645   SODIUM  138   POTASSIUM  5.2   CHLORIDE  100   CO2  16*   GLUCOSE  88   BUN  96*     Recent Labs      01/01/19   0645   SODIUM  138   POTASSIUM  5.2   CHLORIDE  100   CO2  16*   BUN  96*   CREATININE  15.98*   MAGNESIUM  3.5*   CALCIUM  8.6         Results for orders placed or performed during the hospital encounter of 07/19/17   ECHOCARDIOGRAM COMP W/O CONT   Result Value Ref Range    Eject.Frac. MOD BP 58.38     Eject.Frac. MOD 4C 67.14     Eject.Frac. MOD 2C 67.81     Left Ventrical Ejection Fraction 60               Imaging: neuroimaging reviewed and directly visualized by me  MR-LUMBAR SPINE-W/O    (Results Pending)       Assessment/Plan:    History of a status epilepticus of unclear etiology:  resolved in last admission with lacosamide and Vimpat, medications that patient is currently taking?  There is no clarity that this patient has continued having any seizure activity as an outpatient.  We will discontinue the Keppra which is not ideal in a patient with dialysis.    Encephalopathy: Multifactorial, suspected secondary to polypharmacy, and patient with renal disease.  Doubt the episodes of lethargy are necessarily related with seizure events.  Patient shall continue with the outpatient neurology evaluation with Dr. Flores.   tizanidine, Lyrica, where held due to drowsiness.  Keppra has been stopped, continue with Vimpat.    We will obtain an ammonia and a thyroid study.  Will consider to do an EEG  tomorrow    Myoclonus: Consistent with the diagnosis of metabolic encephalopathy.        Kacie Mccloud M.D.    Clinical Professor, [employer]  Diplomate, Neurology , Vascular Neurology, Neurophysiology

## 2019-01-01 NOTE — ED NOTES
Pt assisted to bedside commode with two person assist. Urine collected tubed to lab. Pt connected back to monitor and repositioned in [facility].   Report to Consuelo FINCH.

## 2019-01-01 NOTE — CARE PLAN
"Problem: Pain Management  Goal: Pain level will decrease to patient's comfort goal  Outcome: PROGRESSING SLOWER THAN EXPECTED  Pt experiences chronic and acute pain, medicated per MAR    Problem: Mobility  Goal: Risk for activity intolerance will decrease  Outcome: PROGRESSING SLOWER THAN EXPECTED  Pt states \"I can't walk\" and is unwilling to even attempt with staff assistance      "

## 2019-01-01 NOTE — CONSULTS
Consults   Nephrology Inpatient Consultation    Date of Service  1/1/2019    Reason for Consultation  ESRD    History of Presenting Illness  38 y.o. female admitted 1/1/2019 with ESRD, recent hospitalization with status epilepticus, and was put on medications for this. She subsequently missed dialysis due to weakness. She came in and was found to have a Cr of 15.98; she is now s/p HD which she tolerated well. Normally on a MWF schedule for dialysis.    Referring Physician  Dr. Leiva    Consulting Physician  Spike Gotti M.D.    Review of Systems  Review of Systems   Constitutional: Positive for malaise/fatigue.   Neurological: Positive for weakness.   All other systems reviewed and are negative.     Past Medical History  Past Medical History:   Diagnosis Date   • Pyelonephritis 11/21/2017   • Avascular necrosis of bones of both hips (HCC) 10/10/2016   • ESBL (extended spectrum beta-lactamase) producing bacteria infection 8/25/2014   • Pneumonia    • Clostridium difficile colitis 5/3/2011   • Arthritis     all joints,r/t lupus   • Dialysis patient    • Fibromyalgia    • Hypertension    • Lupus    • Psychiatric disorder     anxiety, depression   • Renal failure        Surgical History  Past Surgical History:   Procedure Laterality Date   • AV FISTULA REVISION Right 8/11/2018    Procedure: AV FISTULA REVISION- Graft and Thrombectomy;  Surgeon: Shabbir Ardon M.D.;  Location: [facility];  Service: General   • AV FISTULA THROMBOLYSIS Right 8/11/2018    Procedure: AV FISTULA THROMBOLYSIS;  Surgeon: Shabbir Ardon M.D.;  Location: [facility];  Service: General   • AV FISTULA CREATION Right 12/9/2017    Procedure: AV FISTULA CREATION-ARM FOR GRAFT;  Surgeon: Lesly Jansen M.D.;  Location: [facility];  Service: General   • THROMBECTOMY  12/9/2017    Procedure: THROMBECTOMY;  Surgeon: Lesly Jansen M.D.;  Location: [facility];  Service: General   •  THROMBECTOMY Right 8/21/2016    Procedure: THROMBECTOMY - right AV fistula graft with grams;  Surgeon: Shabbir Ardon M.D.;  Location: SUR[facility]ERY [facility];  Service:    • ANGIOPLASTY BALLOON  8/21/2016    Procedure: ANGIOPLASTY BALLOON;  Surgeon: Shabbir Ardon M.D.;  Location: SUR[facility]ERY [facility];  Service:    • THROMBECTOMY Right 8/20/2016    Procedure: THROMBECTOMY AV GRAFT;  Surgeon: Shabbir Ardon M.D.;  Location: SUR[facility]ERY [facility];  Service:    • AV FISTULA CREATION Right 7/12/2016    Procedure: AV FISTULA CREATION WITH GRAFT BRACHIAL AXILLARY;  Surgeon: Shabbir Ardon M.D.;  Location: SUR[facility]ERY [facility];  Service:    • CLOSED REDUCTION Right 7/5/2016    Procedure: CLOSED REDUCTION- Hip ;  Surgeon: Michael Holman M.D.;  Location: SURGERY [facility];  Service:    • HIP ARTHROPLASTY TOTAL Right 1/18/2016    Procedure: HIP ARTHROPLASTY TOTAL;  Surgeon: Michael Holman M.D.;  Location: [facility];  Service:    • AV FISTULA CREATION  2/3/2015    Performed by Shabbir Ardon M.D. at SURGERY [facility]   • AV FISTULA CREATION  11/14/2014    Performed by Shabbir Ardon M.D. at SURGERY [facility]   • AV FISTULA CREATION  9/9/2014    Performed by Shabbir Ardon M.D. at SURGERY [facility]   • CATH PLACEMENT  9/9/2014    Performed by Shabbir Ardon M.D. at SURGERY [facility]   • OTHER  5/2011    tracheostomy   • GASTROSCOPY-ENDO  4/27/2011    Performed by PALMIRA DOAN at ENDOSCOPY [facility]   • COLONOSCOPY WITH BIOPSY  4/20/2011    Performed by ALCIRA CRUZ at ENDOSCOPY [facility]   • GASTROSCOPY-ENDO  4/18/2011    Performed by ALCIRA CRUZ at ENDOSCOPY [facility]   • GASTROSCOPY-ENDO  4/10/2011    Performed by SYED JURADO at ENDOSCOPY [facility]   • SCLEROTHERAPHY  4/10/2011    Performed by SYED JURDAO at [facility]COPY [facility]   • OTHER ABDOMINAL SURGERY      kidney biopsy   •  TRACHEOSTOMY         Medications  No current facility-administered medications on file prior to encounter.      Current Outpatient Prescriptions on File Prior to Encounter   Medication Sig Dispense Refill   • Oxycodone HCl 20 MG Tab Take 1 Tab by mouth every 6 hours as needed (Take up to three a day as needed and one additional pill after dialysis (3 times a week)) for up to 28 days. 96 Tab 0   • levETIRAcetam (KEPPRA) 1000 MG tablet Take 1 Tab by mouth every day. Take after hemodialysis 30 Tab 0   • Lacosamide (VIMPAT) 100 MG Tab Take 100 mg by mouth 2 Times a Day for 30 days. 60 Tab 0   • LYRICA 100 MG Cap TAKE 1 CAPSULE BY MOUTH TWICE DAILY  2   • lidocaine (LIDODERM) 5 % Patch Apply 1 Patch to skin as directed every 24 hours. 10 Patch 1   • omeprazole (PRILOSEC) 20 MG delayed-release capsule Take 1 Cap by mouth every day. 30 Cap 3   • sevelamer carbonate (RENVELA) 800 MG Tab tablet Take 2,400 mg by mouth 3 times a day, with meals.     • ferrous sulfate 325 (65 FE) MG tablet Take 325 mg by mouth every day.     • ascorbic acid (ASCORBIC ACID) 500 MG Tab Take 500 mg by mouth every day.     • hydroxychloroquine (PLAQUENIL) 200 MG Tab Take 200 mg by mouth every bedtime.     • cholecalciferol (VITAMIN D3) 5000 UNIT Cap Take 10,000 Units by mouth every day.     • trazodone (DESYREL) 50 MG Tab Take 1 Tab by mouth every bedtime. 30 Tab 0   • tizanidine (ZANAFLEX) 4 MG Tab Take 2 Tabs by mouth every bedtime. 60 Tab 0       Family History  family history includes Cancer in her father; Heart Disease in her mother.    Social History  Social History   Substance Use Topics   • Smoking status: Former Smoker     Packs/day: 0.50     Years: 18.00     Types: Cigarettes     Quit date: 6/13/2011   • Smokeless tobacco: Never Used      Comment: 1/2 ppd   • Alcohol use No       Allergies  Allergies   Allergen Reactions   • Lorazepam [Ativan] Anxiety     Patient becomes severely paranoid and agitated   • Morphine Itching     Tolerates  Dilaudid   • Seasonal Runny Nose and Itching     Hay fever, sabiha brush        Physical Exam  Laboratory/Imaging   Hemodynamics  Temp (24hrs), Av.1 °C (98.7 °F), Min:36.9 °C (98.4 °F), Max:37.2 °C (98.9 °F)   Temperature: 37.2 °C (98.9 °F)  Pulse  Av  Min: 81  Max: 110 Heart Rate (Monitored): 88  Blood Pressure: (!) 195/125 (RN notified), NIBP: 147/89      Respiratory      Respiration: 18, Pulse Oximetry: 93 %        RUL Breath Sounds: Clear, RML Breath Sounds: Clear, RLL Breath Sounds: Clear, LASHANDA Breath Sounds: Clear, LLL Breath Sounds: Clear    Fluids  Date 19 07 - 19 0659   Shift 9225-8100 3629-1056 9394-1088 24 Hour Total   I  N  T  A  K  E   P.O. 240 240  480    IV Piggyback 200   200    Shift Total 440 240  680   O  U  T  P  U  T   Shift Total       Weight (kg) 80.2 80.2 80.2 80.2         Nutrition  Orders Placed This Encounter   Procedures   • Diet Order Renal     Standing Status:   Standing     Number of Occurrences:   1     Order Specific Question:   Diet:     Answer:   Renal [8]       Physical Exam   Constitutional: She is oriented to person, place, and time. She appears well-developed.   Ill appearing   HENT:   Head: Normocephalic and atraumatic.   Cardiovascular: Normal rate and regular rhythm.    Pulmonary/Chest: Effort normal and breath sounds normal.   Abdominal: Soft. Bowel sounds are normal.   Musculoskeletal: She exhibits no edema or tenderness.   Neurological: She is alert and oriented to person, place, and time.   fatigued   Skin: Skin is warm and dry.       Recent Labs      19   0645   WBC  6.2   RBC  2.58*   HEMOGLOBIN  7.8*   HEMATOCRIT  24.0*   MCV  93.0   MCH  30.2   MCHC  32.5*   RDW  45.9   PLATELETCT  154*   MPV  10.6     Recent Labs      19   0645   SODIUM  138   POTASSIUM  5.2   CHLORIDE  100   CO2  16*   GLUCOSE  88   BUN  96*   CREATININE  15.98*   CALCIUM  8.6                      Assessment/Plan     1. ESRD - HD today, then MWF schedule to be back  on outpatient schedule  2. Acidosis - expect to resolve with HD  3. Anemia of CKD  4. Chronic narcotic use due to AVN of both hips    -Epogen  -HD today and MWF  -Will follow

## 2019-01-01 NOTE — DISCHARGE PLANNING
Discussed pt with Dr Leiva. Pt requires SNF  Provided pt with choice form. Pt requested at least 30 minutes to look up facilities before making a decision. Advised pt this writer will return in 30 minutes.

## 2019-01-01 NOTE — ASSESSMENT & PLAN NOTE
MRI does not indicates severe spinal stenosis.    Likely muscle strain.  Pain improving.  Able to mobilize.

## 2019-01-01 NOTE — ASSESSMENT & PLAN NOTE
Missed HD secondary to back pain.  Resumed MWF HD while in the hospital.  Volume status stable.  Nephrology following.

## 2019-01-01 NOTE — ASSESSMENT & PLAN NOTE
Patient at this point has been counseled on compliance she says she does not want to be noncompliant on purpose.  The patient's non-compliance seems to be secondary to her major depression.  Mood slowly improving.  Psychiatry following.  Pending group home for discharge.

## 2019-01-01 NOTE — ASSESSMENT & PLAN NOTE
Continue with blood pressure management optimization keep systolic blood pressure under 140 diastolic under 90

## 2019-01-01 NOTE — ED PROVIDER NOTES
ED Provider Note    CHIEF COMPLAINT  Chief Complaint   Patient presents with   • Other       HPI  Debby Carrizales is a 38 y.o. female who presents with generalized weakness.  Patient reports that since her most recent hospital admission she has not been doing very well.  She has had shortness of breath generalized weakness.  Periodic shaking all over.  Her weakness has become so severe over the last few days that she has a very difficult time getting around and moving or caring for herself.  She has not had a fever.  She does feel cold.  She has had an occasional cough.  No difficulty breathing.  Occasionally urinates without any noted abnormalities.  Denies nausea vomiting.  Said a normal bowel movement.  She has generalized body aches and pains and joint pains in her hands wrists shoulders hips and back.  These are chronic and related to her lupus.    REVIEW OF SYSTEMS  As per HPI, otherwise a 10 point review of systems is negative    PAST MEDICAL HISTORY  Past Medical History:   Diagnosis Date   • Arthritis     all joints,r/t lupus   • Avascular necrosis of bones of both hips (HCC) 10/10/2016   • Clostridium difficile colitis 5/3/2011   • Dialysis patient    • ESBL (extended spectrum beta-lactamase) producing bacteria infection 8/25/2014   • Fibromyalgia    • Hypertension    • Lupus    • Pneumonia    • Psychiatric disorder     anxiety, depression   • Pyelonephritis 11/21/2017   • Renal failure        SOCIAL HISTORY  Social History   Substance Use Topics   • Smoking status: Former Smoker     Packs/day: 0.50     Years: 18.00     Types: Cigarettes     Quit date: 6/13/2011   • Smokeless tobacco: Never Used      Comment: 1/2 ppd   • Alcohol use No       SURGICAL HISTORY  Past Surgical History:   Procedure Laterality Date   • AV FISTULA REVISION Right 8/11/2018    Procedure: AV FISTULA REVISION- Graft and Thrombectomy;  Surgeon: Shabbir Ardon M.D.;  Location: SURGERY [facility];  Service:  General   • AV FISTULA THROMBOLYSIS Right 8/11/2018    Procedure: AV FISTULA THROMBOLYSIS;  Surgeon: Shabbir Ardon M.D.;  Location: SURGERY [facility];  Service: General   • AV FISTULA CREATION Right 12/9/2017    Procedure: AV FISTULA CREATION-ARM FOR GRAFT;  Surgeon: Lesly Jansen M.D.;  Location: [facility];  Service: General   • THROMBECTOMY  12/9/2017    Procedure: THROMBECTOMY;  Surgeon: Lesly Jansen M.D.;  Location: SURGERY [facility];  Service: General   • THROMBECTOMY Right 8/21/2016    Procedure: THROMBECTOMY - right AV fistula graft with grams;  Surgeon: Shabbir Ardon M.D.;  Location: [facility];  Service:    • ANGIOPLASTY BALLOON  8/21/2016    Procedure: ANGIOPLASTY BALLOON;  Surgeon: Shabbir Ardon M.D.;  Location: [facility];  Service:    • THROMBECTOMY Right 8/20/2016    Procedure: THROMBECTOMY AV GRAFT;  Surgeon: Shabbir Ardon M.D.;  Location: [facility];  Service:    • AV FISTULA CREATION Right 7/12/2016    Procedure: AV FISTULA CREATION WITH GRAFT BRACHIAL AXILLARY;  Surgeon: Shabbir Ardon M.D.;  Location: [facility];  Service:    • CLOSED REDUCTION Right 7/5/2016    Procedure: CLOSED REDUCTION- Hip ;  Surgeon: Michael Holman M.D.;  Location: [facility];  Service:    • HIP ARTHROPLASTY TOTAL Right 1/18/2016    Procedure: HIP ARTHROPLASTY TOTAL;  Surgeon: Michael Holman M.D.;  Location: [facility];  Service:    • AV FISTULA CREATION  2/3/2015    Performed by Shabbir Ardon M.D. at [facility]   • AV FISTULA CREATION  11/14/2014    Performed by Shabbir Ardon M.D. at [facility]   • AV FISTULA CREATION  9/9/2014    Performed by Shabbir Ardon M.D. at [facility]   • CATH PLACEMENT  9/9/2014    Performed by Shabbir Ardon M.D. at [facility]   • OTHER  5/2011    tracheostomy   • GASTROSCOPY-ENDO  4/27/2011     "Performed by PALMIRA DOAN at ENDOSCOPY [facility]   • COLONOSCOPY WITH BIOPSY  4/20/2011    Performed by ALCIRA CRUZ at ENDOSCOPY [facility]   • GASTROSCOPY-ENDO  4/18/2011    Performed by ALCIRA CRUZ at ENDOSCOPY [facility]   • GASTROSCOPY-ENDO  4/10/2011    Performed by SYED JURADO at ENDOSCOPY [facility]   • SCLEROTHERAPHY  4/10/2011    Performed by SYED JURADO at ENDOSCOPY [facility]   • OTHER ABDOMINAL SURGERY      kidney biopsy   • TRACHEOSTOMY         CURRENT MEDICATIONS  Home Medications     Reviewed by Toby Leiva M.D. (Physician) on 01/01/19 at 0855  Med List Status: Complete   Medication Last Dose Status   ascorbic acid (ASCORBIC ACID) 500 MG Tab 12/31/2018 Active   cholecalciferol (VITAMIN D3) 5000 UNIT Cap 12/31/2018 Active   ferrous sulfate 325 (65 FE) MG tablet 12/31/2018 Active   hydroxychloroquine (PLAQUENIL) 200 MG Tab 12/30/2018 Active   Lacosamide (VIMPAT) 100 MG Tab 12/31/2018 Active   levETIRAcetam (KEPPRA) 1000 MG tablet 12/28/2018 Active   lidocaine (LIDODERM) 5 % Patch 12/30/2018 Active   LYRICA 100 MG Cap 12/31/2018 Active   omeprazole (PRILOSEC) 20 MG delayed-release capsule 12/31/2018 Active   Oxycodone HCl 20 MG Tab 12/31/2018 Active   promethazine (PHENERGAN) 25 MG Tab 12/31/2018 Active   sevelamer carbonate (RENVELA) 800 MG Tab tablet 12/29/2018 Active   tizanidine (ZANAFLEX) 4 MG Tab 12/30/2018 Active   trazodone (DESYREL) 50 MG Tab 12/30/2018 Active                ALLERGIES  Allergies   Allergen Reactions   • Lorazepam [Ativan] Anxiety     Patient becomes severely paranoid and agitated   • Morphine Itching     Tolerates Dilaudid   • Seasonal Runny Nose and Itching     Hay fever, sabiha brush       PHYSICAL EXAM  VITAL SIGNS: Pulse 91   Temp 36.9 °C (98.4 °F) (Temporal)   Resp 17   Ht 1.651 m (5' 5\")   Wt 86 kg (189 lb 9.5 oz) Comment: 86  LMP 12/04/2018   SpO2 93%   BMI 31.55 kg/m²    Constitutional: Awake and " alert.  Pale appearing  HENT:  Atraumatic, Normocephalic.Oropharynx dry mucus membranes and dry lips, Nose normal inspection.   Eyes: Normal inspection  Neck: Supple  Cardiovascular: Normal heart rate, Normal rhythm.  Symmetric peripheral pulses.   Thorax & Lungs: No respiratory distress, No wheezing, No rales, No rhonchi, No chest tenderness.   Abdomen: Bowel sounds normal, soft, non-distended, nontender, no mass  Skin: Warm, Dry, No rash.   Back: No tenderness, No CVA tenderness.   Extremities: No asymmetric swelling  Neurologic: Grossly normal   Psychiatric: Anxious appearing      Labs:  Results for orders placed or performed during the hospital encounter of 01/01/19   CBC WITH DIFFERENTIAL   Result Value Ref Range    WBC 6.2 4.8 - 10.8 K/uL    RBC 2.58 (L) 4.20 - 5.40 M/uL    Hemoglobin 7.8 (L) 12.0 - 16.0 g/dL    Hematocrit 24.0 (L) 37.0 - 47.0 %    MCV 93.0 81.4 - 97.8 fL    MCH 30.2 27.0 - 33.0 pg    MCHC 32.5 (L) 33.6 - 35.0 g/dL    RDW 45.9 35.9 - 50.0 fL    Platelet Count 154 (L) 164 - 446 K/uL    MPV 10.6 9.0 - 12.9 fL    Neutrophils-Polys 63.20 44.00 - 72.00 %    Lymphocytes 20.20 (L) 22.00 - 41.00 %    Monocytes 11.40 0.00 - 13.40 %    Eosinophils 3.60 0.00 - 6.90 %    Basophils 0.50 0.00 - 1.80 %    Immature Granulocytes 1.10 (H) 0.00 - 0.90 %    Nucleated RBC 0.00 /100 WBC    Neutrophils (Absolute) 3.89 2.00 - 7.15 K/uL    Lymphs (Absolute) 1.24 1.00 - 4.80 K/uL    Monos (Absolute) 0.70 0.00 - 0.85 K/uL    Eos (Absolute) 0.22 0.00 - 0.51 K/uL    Baso (Absolute) 0.03 0.00 - 0.12 K/uL    Immature Granulocytes (abs) 0.07 0.00 - 0.11 K/uL    NRBC (Absolute) 0.00 K/uL   COMP METABOLIC PANEL   Result Value Ref Range    Sodium 138 135 - 145 mmol/L    Potassium 5.2 3.6 - 5.5 mmol/L    Chloride 100 96 - 112 mmol/L    Co2 16 (L) 20 - 33 mmol/L    Anion Gap 22.0 (H) 0.0 - 11.9    Glucose 88 65 - 99 mg/dL    Bun 96 (HH) 8 - 22 mg/dL    Creatinine 15.98 (HH) 0.50 - 1.40 mg/dL    Calcium 8.6 8.5 - 10.5 mg/dL     AST(SGOT) 5 (L) 12 - 45 U/L    ALT(SGPT) <5 2 - 50 U/L    Alkaline Phosphatase 63 30 - 99 U/L    Total Bilirubin 0.5 0.1 - 1.5 mg/dL    Albumin 3.4 3.2 - 4.9 g/dL    Total Protein 6.6 6.0 - 8.2 g/dL    Globulin 3.2 1.9 - 3.5 g/dL    A-G Ratio 1.1 g/dL   LIPASE   Result Value Ref Range    Lipase 22 11 - 82 U/L   URINALYSIS CULTURE, IF INDICATED   Result Value Ref Range    Color Yellow     Character Clear     Specific Gravity 1.011 <1.035    Ph 7.5 5.0 - 8.0    Glucose 250 (A) Negative mg/dL    Ketones Negative Negative mg/dL    Protein 300 (A) Negative mg/dL    Bilirubin Negative Negative    Urobilinogen, Urine 0.2 Negative    Nitrite Negative Negative    Leukocyte Esterase Trace (A) Negative    Occult Blood Negative Negative    Micro Urine Req Microscopic    URINE DRUG SCREEN   Result Value Ref Range    Amphetamines Urine Negative Negative    Barbiturates Negative Negative    Benzodiazepines Negative Negative    Cocaine Metabolite Negative Negative    Methadone Negative Negative    Opiates Negative Negative    Oxycodone Positive (A) Negative    Phencyclidine -Pcp Negative Negative    Propoxyphene Negative Negative    Cannabinoid Metab Negative Negative   URINE MICROSCOPIC (W/UA)   Result Value Ref Range    WBC 20-50 (A) /hpf    RBC 0-2 /hpf    Bacteria Negative None /hpf    Epithelial Cells Negative /hpf    Hyaline Cast 0-2 /lpf   ESTIMATED GFR   Result Value Ref Range    GFR If  3 (A) >60 mL/min/1.73 m 2    GFR If Non African American 3 (A) >60 mL/min/1.73 m 2           COURSE & MEDICAL DECISION MAKING  Patient presents with generalized weakness and failure to thrive.  She has body aches.  No fever.  She is not tachycardic.  Her blood pressure is stable.  Ordered screening evaluation.  She was identified to have pyuria.  She was given ceftriaxone intravenously.  She does not have leukocytosis, tachypnea, fever.  Her heart rate did go up to the 90s.  1/4 Sirs.  At this point because of her  generalized weakness and urinary tract infection she will be admitted to the hospital for further workup and treatment.  Dr. Haney was consulted.      FINAL IMPRESSION  1.  Pyuria  2.  Anemia   3.  generalized weakness      This dictation was created using voice recognition software. The accuracy of the dictation is limited to the abilities of the software.  The nursing notes were reviewed and certain aspects of this information were incorporated into this note.      Electronically signed by: Irving Valenzuela, 1/1/2019 9:11 AM

## 2019-01-02 ENCOUNTER — PATIENT OUTREACH (OUTPATIENT)
Dept: HEALTH INFORMATION MANAGEMENT | Facility: OTHER | Age: 39
End: 2019-01-02

## 2019-01-02 LAB
ANION GAP SERPL CALC-SCNC: 14 MMOL/L (ref 0–11.9)
BUN SERPL-MCNC: 42 MG/DL (ref 8–22)
CALCIUM SERPL-MCNC: 7.9 MG/DL (ref 8.5–10.5)
CHLORIDE SERPL-SCNC: 106 MMOL/L (ref 96–112)
CO2 SERPL-SCNC: 21 MMOL/L (ref 20–33)
CREAT SERPL-MCNC: 8.66 MG/DL (ref 0.5–1.4)
GLUCOSE SERPL-MCNC: 91 MG/DL (ref 65–99)
POTASSIUM SERPL-SCNC: 3.3 MMOL/L (ref 3.6–5.5)
SODIUM SERPL-SCNC: 141 MMOL/L (ref 135–145)

## 2019-01-02 PROCEDURE — 96375 TX/PRO/DX INJ NEW DRUG ADDON: CPT

## 2019-01-02 PROCEDURE — 99232 SBSQ HOSP IP/OBS MODERATE 35: CPT | Performed by: INTERNAL MEDICINE

## 2019-01-02 PROCEDURE — 97165 OT EVAL LOW COMPLEX 30 MIN: CPT

## 2019-01-02 PROCEDURE — 5A1D70Z PERFORMANCE OF URINARY FILTRATION, INTERMITTENT, LESS THAN 6 HOURS PER DAY: ICD-10-PCS | Performed by: INTERNAL MEDICINE

## 2019-01-02 PROCEDURE — 700101 HCHG RX REV CODE 250: Performed by: HOSPITALIST

## 2019-01-02 PROCEDURE — 95951 EEG: CPT | Mod: 52 | Performed by: PSYCHIATRY & NEUROLOGY

## 2019-01-02 PROCEDURE — 99231 SBSQ HOSP IP/OBS SF/LOW 25: CPT | Performed by: PSYCHIATRY & NEUROLOGY

## 2019-01-02 PROCEDURE — 700102 HCHG RX REV CODE 250 W/ 637 OVERRIDE(OP): Performed by: HOSPITALIST

## 2019-01-02 PROCEDURE — 90935 HEMODIALYSIS ONE EVALUATION: CPT

## 2019-01-02 PROCEDURE — 95951 EEG: CPT | Mod: 26,52 | Performed by: PSYCHIATRY & NEUROLOGY

## 2019-01-02 PROCEDURE — 700111 HCHG RX REV CODE 636 W/ 250 OVERRIDE (IP): Performed by: HOSPITALIST

## 2019-01-02 PROCEDURE — A9270 NON-COVERED ITEM OR SERVICE: HCPCS | Performed by: HOSPITALIST

## 2019-01-02 PROCEDURE — 97162 PT EVAL MOD COMPLEX 30 MIN: CPT

## 2019-01-02 PROCEDURE — 80048 BASIC METABOLIC PNL TOTAL CA: CPT

## 2019-01-02 PROCEDURE — 770006 HCHG ROOM/CARE - MED/SURG/GYN SEMI*

## 2019-01-02 PROCEDURE — 4A10X4Z MONITORING OF CENTRAL NERVOUS ELECTRICAL ACTIVITY, EXTERNAL APPROACH: ICD-10-PCS | Performed by: PSYCHIATRY & NEUROLOGY

## 2019-01-02 RX ADMIN — HEPARIN SODIUM 5000 UNITS: 5000 INJECTION, SOLUTION INTRAVENOUS; SUBCUTANEOUS at 21:15

## 2019-01-02 RX ADMIN — OXYCODONE HYDROCHLORIDE AND ACETAMINOPHEN 500 MG: 500 TABLET ORAL at 06:00

## 2019-01-02 RX ADMIN — Medication 325 MG: at 06:00

## 2019-01-02 RX ADMIN — TRAZODONE HYDROCHLORIDE 50 MG: 50 TABLET ORAL at 21:15

## 2019-01-02 RX ADMIN — CLONIDINE HYDROCHLORIDE 0.1 MG: 0.1 TABLET ORAL at 03:39

## 2019-01-02 RX ADMIN — ONDANSETRON 4 MG: 2 INJECTION INTRAMUSCULAR; INTRAVENOUS at 16:26

## 2019-01-02 RX ADMIN — STANDARDIZED SENNA CONCENTRATE AND DOCUSATE SODIUM 2 TABLET: 8.6; 5 TABLET, FILM COATED ORAL at 06:07

## 2019-01-02 RX ADMIN — HYDROXYCHLOROQUINE SULFATE 200 MG: 200 TABLET, FILM COATED ORAL at 21:15

## 2019-01-02 RX ADMIN — ENALAPRILAT 1.25 MG: 1.25 INJECTION INTRAVENOUS at 06:37

## 2019-01-02 RX ADMIN — LIDOCAINE 1 PATCH: 50 PATCH TOPICAL at 06:07

## 2019-01-02 RX ADMIN — Medication 200 UNITS: at 10:46

## 2019-01-02 RX ADMIN — SEVELAMER CARBONATE 2400 MG: 800 TABLET, FILM COATED ORAL at 17:35

## 2019-01-02 RX ADMIN — OMEPRAZOLE 20 MG: 20 CAPSULE, DELAYED RELEASE ORAL at 06:07

## 2019-01-02 RX ADMIN — HEPARIN SODIUM 5000 UNITS: 5000 INJECTION, SOLUTION INTRAVENOUS; SUBCUTANEOUS at 06:07

## 2019-01-02 RX ADMIN — LACOSAMIDE 100 MG: 50 TABLET, FILM COATED ORAL at 17:35

## 2019-01-02 RX ADMIN — OXYCODONE HYDROCHLORIDE 20 MG: 10 TABLET ORAL at 02:14

## 2019-01-02 RX ADMIN — LACOSAMIDE 100 MG: 50 TABLET, FILM COATED ORAL at 06:07

## 2019-01-02 RX ADMIN — SEVELAMER CARBONATE 2400 MG: 800 TABLET, FILM COATED ORAL at 07:42

## 2019-01-02 RX ADMIN — OXYCODONE HYDROCHLORIDE 20 MG: 10 TABLET ORAL at 16:25

## 2019-01-02 RX ADMIN — Medication 200 UNITS: at 16:26

## 2019-01-02 RX ADMIN — OXYCODONE HYDROCHLORIDE 20 MG: 10 TABLET ORAL at 08:14

## 2019-01-02 ASSESSMENT — COGNITIVE AND FUNCTIONAL STATUS - GENERAL
CLIMB 3 TO 5 STEPS WITH RAILING: A LITTLE
HELP NEEDED FOR BATHING: A LITTLE
MOVING FROM LYING ON BACK TO SITTING ON SIDE OF FLAT BED: A LITTLE
SUGGESTED CMS G CODE MODIFIER MOBILITY: CJ
WALKING IN HOSPITAL ROOM: A LITTLE
MOBILITY SCORE: 21
DAILY ACTIVITIY SCORE: 22
SUGGESTED CMS G CODE MODIFIER DAILY ACTIVITY: CJ
TOILETING: A LITTLE

## 2019-01-02 ASSESSMENT — GAIT ASSESSMENTS
GAIT LEVEL OF ASSIST: STAND BY ASSIST
DISTANCE (FEET): 50
DEVIATION: ANTALGIC;STEP TO

## 2019-01-02 ASSESSMENT — PAIN SCALES - GENERAL
PAINLEVEL_OUTOF10: 6
PAINLEVEL_OUTOF10: 0
PAINLEVEL_OUTOF10: 8

## 2019-01-02 ASSESSMENT — ENCOUNTER SYMPTOMS
FEVER: 0
CHILLS: 0

## 2019-01-02 ASSESSMENT — ACTIVITIES OF DAILY LIVING (ADL): TOILETING: INDEPENDENT

## 2019-01-02 NOTE — PROGRESS NOTES
Report received from day RN, assumed Care.   Patient is AOx4, responds appropriately.      Pain controlled at this time. Pt complaining of generalized weakness.  Patient is tolerating renal diet, denies nausea/vomiting. -flatus (last BM PTA), pt refusing stool softeners.  Up self with steady gait.    L upper chest port. R upper arm fistula, bruit and thrill present.    Plan of care discussed, all questions answered.    Explained importance of calling before getting OOB and pt verbalizes understanding.       Call light and belongings within reach, treaded slipper socks on, bed in lowest locked position.  Hourly rounding in place, all needs met at this time

## 2019-01-02 NOTE — DISCHARGE PLANNING
Outpatient Dialysis Note    Confirmed patient is active at:    [facility]  [address]   [place] [address]      Schedule: Monday, Wednesday, Friday  Time: 3:45 pm    Spoke with Hafsa at facility who confirmed. Hafsa states that patient is non-compliant calls into appointments stating she has to take her Grandma to appointments and cannot make HD appointment.    Forwarded records for review.    Dialysis Coordinator, [facility]  Kelli Anderson [phone]

## 2019-01-02 NOTE — PROGRESS NOTES
Per radiology tech, anesthesia office is not available today so MRI will likely happen tomorrow or the next day as pt requires sedation for MRI

## 2019-01-02 NOTE — THERAPY
"Physical Therapy Evaluation completed.   Bed Mobility:  Supine to Sit: Modified Independent  Transfers: Sit to Stand: Supervised  Gait: Level Of Assist: Stand by Assist with No Equipment Needed       Plan of Care: Will benefit from Physical Therapy 3 times per week  Discharge Recommendations: Equipment: Will Continue to Assess for Equipment Needs.     Pt presents with c/c of lumbar pain found to have cre 15 2/2 HD noncompliance. Pt has a significant hx of variable musculoskeletal complaints dating back to 2003; since, pt appears to come in about every 1-2 months with complaint of pain that usually does not persist past an ER visit with pain med administration; it is also unclear if she is caring for her grandmother or if grandmother is assisting her as she wishes to obtain caregiver payment for her grandmother. It is unclear how much of her current complaint is organic, she had a similar report in 7/2017 receiving a CT and MRI of the lumbar spine, both negative; demonstrating high functional abilities (crawling into bed, dissociation of pelvis without reports of pain) PT evaluation of hip was interrupted by HD transport, unclear if pt is having referral pain from left hip as there is known AVN. From a PT perspective, pt has demonstrated a clear pattern of inability to care for herself at home (particularly in a 2nd story apartment requiring 15 stairs); pt will need a group home environment or long term care facility for optimal medical management/complaince with HD and mobility. Pt is currently not agreeable; may benefit from cognitive evaluation to ensure she has analytical self care capabilities. Will follow.      See \"Rehab Therapy-Acute\" Patient Summary Report for complete documentation.     "

## 2019-01-02 NOTE — CARE PLAN
Problem: Pain Management  Goal: Pain level will decrease to patient's comfort goal    Intervention: Follow pain managment plan developed in collaboration with patient and Interdisciplinary Team  Educated pt to verbalize when pain is not tolerable, assessing pain Q4 per protocol      Problem: Mobility  Goal: Risk for activity intolerance will decrease    Intervention: Assess and monitor signs of activity intolerance  Educated pt to call for assistance before getting OOB

## 2019-01-02 NOTE — PROGRESS NOTES
Patient Diagnosis and Management daily plan per neurology:        1)  Encephalopathy: metabolic/toxic.  Recommend reinstate the Lyrica and Zanaflex at the time of discharge only to avoid excessive sedation.  Vimpat at current dosing to continue despite the fact that she is not having any more seizures, and to arrange her outpatient follow up with Dr Flores within a month.  Neurology signs off.    Complete neurological note to support plan is below.    Chief Complain:F/U for: pain/weakness follow up    SUBJECTIVE: Pain        ROS:unchanged      PHYSICAL EXAMINATION:    Constitutional: lying in bed, getting her EEG    CARDIOVASCULAR:S1,S2    PULMONARY:CTA      EXTREMITIES:No edema    NEUROLOGICAL:    MENTAL: awake, oriented #5, obeying commands      CRANIAL NERVES:2-12 normal.  MOTOR:5/5    SENSORY:intact    DTR:+1    BABINSKI:neg    Movements: No abnormal noticed today  Myoclonus resolved with the dialysis  and off the lyrica    CEREBELLAR:  No   dysmetria     GAIT: deferred                      Vitals:    01/02/19 0335 01/02/19 0430 01/02/19 0632 01/02/19 0750   BP: (!) 177/108 (!) 169/87 (!) 179/97 (!) 170/98   Pulse: 87 88 88 87   Resp: 18 18 18 16   Temp:    36.9 °C (98.5 °F)   TempSrc:    Temporal   SpO2:    91%   Weight:       Height:                      Imaging: neuroimaging reviewed and directly visualized by me  MR-LUMBAR SPINE-W/O    (Results Pending)         Labs:  Recent Labs      01/01/19   0645   WBC  6.2   RBC  2.58*   HEMOGLOBIN  7.8*   HEMATOCRIT  24.0*   MCV  93.0   MCH  30.2   MCHC  32.5*   RDW  45.9   PLATELETCT  154*   MPV  10.6     Recent Labs      01/01/19   0645  01/02/19   0700   SODIUM  138  141   POTASSIUM  5.2  3.3*   CHLORIDE  100  106   CO2  16*  21   GLUCOSE  88  91   BUN  96*  42*   CREATININE  15.98*  8.66*   CALCIUM  8.6  7.9*                     Recent Labs      01/01/19   0645  01/02/19   0700   SODIUM  138  141   POTASSIUM  5.2  3.3*   CHLORIDE  100  106   CO2  16*  21   GLUCOSE   88  91   BUN  96*  42*     Recent Labs      01/01/19   0645  01/02/19   0700   SODIUM  138  141   POTASSIUM  5.2  3.3*   CHLORIDE  100  106   CO2  16*  21   BUN  96*  42*   CREATININE  15.98*  8.66*   MAGNESIUM  3.5*   --    CALCIUM  8.6  7.9*         Results for orders placed or performed during the hospital encounter of 07/19/17   ECHOCARDIOGRAM COMP W/O CONT   Result Value Ref Range    Eject.Frac. MOD BP 58.38     Eject.Frac. MOD 4C 67.14     Eject.Frac. MOD 2C 67.81     Left Ventrical Ejection Fraction 60                      Kacie Mccloud M.D.    Diplomate Neurology, Vascular Neurology and Neurophysiology

## 2019-01-02 NOTE — PROGRESS NOTES
HEMODIALYSIS NOTES:     HD today x 3 hours per Dr. Gotti. Initiated at 1146 and ended at 1446. Patient had jerking -like movement on upper extremities upon arrival to HD. Patient stated that it happen even pt was on ER. Patient tolerated treatment. Please see paper flowsheet for details.    UF Net: 2100 mL    Blood returned. Applied gauze and held r AVF site for 10 minutes. Verified no bleeding. Bruit and thrill present post dialysis. Instructions given to Primary RN that if bleeding occurs on the AVF site, change dressing and held the site with pressure.     Report given to Primary RN.

## 2019-01-02 NOTE — PROCEDURES
EEG 01/02/19 11:46 AM    VIDEO ELECTROENCEPHALOGRAM / EPILEPSY MONITORING UNIT REPORT      Referring provider: DR. ESQUIVEL    DOS:   1/2/2019      INDICATION: Debby Carrizales 38 y.o. female presenting with  status epilepticus back on December 13, change of mental status and probable seizures now    CURRENT ANTIEPILEPTIC REGIMEN: Vimpat     DURATION: 28 minutes      TECHNIQUE: A 30-channel video electroencephalogram (VEEG) was performed in accordance with the international 10-20 system. This digital study was reviewed in bipolar and referential montages. The recording examined the patient during wakeful, drowsy and sleep states.         DESCRIPTION OF THE RECORD:  During the awake state, background shows symmetrical 7-8 Hz activity posteriorly with amplitude of 70 mV. There are frequent prolonged monomorphic delta bursts over frontal regions-- more prominent over right frontal region-- at times, there are frontal sharp bursts      During the sleep state, background shows diffuse high-amplitude 4-5 Hz delta activity.  Symmetrical high-amplitude sleep spindles and vertex sharp activities were seen in the central leads.    ACTIVATION PROCEDURES:     Hyperventilation was not performed by the patient.   Intermittent Photic stimulation was performed in a stepwise fashion from 1 to 30 Hz and elicited a normal response (photic driving), most noticeable in the posterior leads.      ICTAL AND/OR INTERICTAL FINDINGS:   No focal or generalized epileptiform activity was noted. There are frequent prolonged monomorphic delta bursts over frontal regions-- more prominent over right frontal region-- at times, there are frontal sharp bursts  No seizures were recorded during the study.     EVENT(S):  NONE    EKG: sampling review of EKG recording demonstrated regular rhythm      INTERPRETATION:       ________________________________________________________________________    This is abnormal routine video EEG recording in the  awake and drowsy/sleep state(s).    This scalp EEG denotes      1. Diffuse nonspecific cortical dysfunction - moderate     2. Focal cortical dysfunction / irritability over frontal (R>L)--this patten increases the risk of frontal seizure - advise clinical correlation regarding management      If clinical suspicion of active seizure remains high.  Prolonged EEG   monitoring may be of help.    EEG 01/02/19 11:54 AM    ________________________________________________________________________  ________________________________________________________________________      REFERENCE    EEG is described as 'abnormal' (that is 'not normal' brain activity) does not 'prove' that the person has epilepsy and does not mean the patient needs treatment. ... Also, many people who do have epilepsy will only have 'abnormal' activity on the EEG if they have a seizure at the time the test is happening.     ________________________________________________________________________

## 2019-01-02 NOTE — PROGRESS NOTES
EEG preformed this morning. Patient to go to dialysis following. Patient MRI will not be able to be done until tomorrow at 13:00, patient with need t NPO at MN as she will be sedated. Anaesthesia consent on chart will be reviewed with patient by anesthesia tomorrow.

## 2019-01-02 NOTE — PROGRESS NOTES
Nephrology Daily Progress Note    Date of Service  1/2/2019    Chief Complaint  38 y.o. female admitted 1/1/2019 with ESRD, missed HD    Interval Problem Update  Feeling better.  Normally on MWF schedule, HD today      Review of Systems  Review of Systems   Constitutional: Negative for chills and fever.   All other systems reviewed and are negative.       Physical Exam  Temp:  [36.3 °C (97.4 °F)-37.4 °C (99.4 °F)] 36.9 °C (98.5 °F)  Pulse:  [87-97] 87  Resp:  [16-18] 16  BP: (154-179)/() 170/98    Physical Exam   Constitutional: She appears well-developed and well-nourished.   HENT:   Head: Normocephalic and atraumatic.   Cardiovascular: Normal rate and regular rhythm.    Pulmonary/Chest: Effort normal and breath sounds normal.   Musculoskeletal: She exhibits no edema or tenderness.       Fluids    Intake/Output Summary (Last 24 hours) at 01/02/19 1132  Last data filed at 01/02/19 0400   Gross per 24 hour   Intake             1840 ml   Output             2700 ml   Net             -860 ml       Laboratory  Recent Labs      01/01/19   0645   WBC  6.2   RBC  2.58*   HEMOGLOBIN  7.8*   HEMATOCRIT  24.0*   MCV  93.0   MCH  30.2   MCHC  32.5*   RDW  45.9   PLATELETCT  154*   MPV  10.6     Recent Labs      01/01/19   0645  01/02/19   0700   SODIUM  138  141   POTASSIUM  5.2  3.3*   CHLORIDE  100  106   CO2  16*  21   GLUCOSE  88  91   BUN  96*  42*   CREATININE  15.98*  8.66*   CALCIUM  8.6  7.9*                   Imaging  MR-LUMBAR SPINE-W/O    (Results Pending)        Assessment/Plan  1. ESRD - HD MWF, HD today to be back on schedule  2. Seizures - noted neurology recommendations  3. Anemia - epogen    -stable for discharge from a renal standpoint after HD today  -HD today

## 2019-01-02 NOTE — PROGRESS NOTES
"Patient reports still feeling fatigued and having pain. Dialysis yesterday am labs drawn. Encouraged patient to sit up for meals and ambulate with Staff. Patient BP high medicating with PRN's. Patient right upper arm fistula with bruited and thrill present. MD orders reviewed, all questions answered. BP (!) 179/97 Comment: RN aware  Pulse 88   Temp 37.4 °C (99.4 °F) (Temporal)   Resp 18   Ht 1.651 m (5' 5\")   Wt 80.2 kg (176 lb 12.9 oz)   LMP 12/04/2018   SpO2 96%   Breastfeeding? No   BMI 29.42 kg/m²     "

## 2019-01-02 NOTE — THERAPY
"Occupational Therapy Evaluation completed.   Functional Status:  SPV level BADLs in this setting  Plan of Care: Will benefit from Occupational Therapy 1 times per week  Discharge Recommendations:  Equipment: No Equipment Needed. Post-acute therapy: Pt has demonstrated a clear pattern of inability to care for herself at home, pt will need a group home environment or long term care facility for optimal medical management/compliance w/ HD. Pt would benefit from a cognitive evaluation to assess cognitive status and ability to care for herself.     See \"Rehab Therapy-Acute\" Patient Summary Report for complete documentation.    Pt is a 37 y/o female who presents to acute 2/2 HD non compliance, found to have creatine of 15 and lumbar pain. PMH includes arthritis, c-diff, Dialysis, fibromyalgia, HTN, lupus, L THR and psychiatric disorder. Pt reports she lives alone in second story apartment, is usually independent w/ BADLs. Reports her grandmother assists w/ IADLs. Her goal is for her grandmother to get paid to be her caregiver. Pt reports that she uses a slide board and and a w/c at home, however was able to perform functional mobility w/ no AD within room. Pt appears to have poor initiation and follow through at home to manage her medical needs. Recommend DC to a group home. Will follow while in house to ensure that pt remains at current level of function.   "

## 2019-01-03 ENCOUNTER — APPOINTMENT (OUTPATIENT)
Dept: RADIOLOGY | Facility: MEDICAL CENTER | Age: 39
DRG: 682 | End: 2019-01-03
Attending: HOSPITALIST
Payer: MEDICARE

## 2019-01-03 LAB
ANION GAP SERPL CALC-SCNC: 12 MMOL/L (ref 0–11.9)
ANION GAP SERPL CALC-SCNC: 12 MMOL/L (ref 0–11.9)
BACTERIA UR CULT: NORMAL
BASOPHILS # BLD AUTO: 1.2 % (ref 0–1.8)
BASOPHILS # BLD: 0.04 K/UL (ref 0–0.12)
BUN SERPL-MCNC: 28 MG/DL (ref 8–22)
BUN SERPL-MCNC: 30 MG/DL (ref 8–22)
CALCIUM SERPL-MCNC: 8.8 MG/DL (ref 8.5–10.5)
CALCIUM SERPL-MCNC: 9.1 MG/DL (ref 8.5–10.5)
CHLORIDE SERPL-SCNC: 102 MMOL/L (ref 96–112)
CHLORIDE SERPL-SCNC: 102 MMOL/L (ref 96–112)
CO2 SERPL-SCNC: 27 MMOL/L (ref 20–33)
CO2 SERPL-SCNC: 28 MMOL/L (ref 20–33)
CREAT SERPL-MCNC: 6.27 MG/DL (ref 0.5–1.4)
CREAT SERPL-MCNC: 7.14 MG/DL (ref 0.5–1.4)
EOSINOPHIL # BLD AUTO: 0.15 K/UL (ref 0–0.51)
EOSINOPHIL NFR BLD: 4.6 % (ref 0–6.9)
ERYTHROCYTE [DISTWIDTH] IN BLOOD BY AUTOMATED COUNT: 45.1 FL (ref 35.9–50)
GLUCOSE SERPL-MCNC: 85 MG/DL (ref 65–99)
GLUCOSE SERPL-MCNC: 91 MG/DL (ref 65–99)
HCT VFR BLD AUTO: 28.6 % (ref 37–47)
HGB BLD-MCNC: 9 G/DL (ref 12–16)
IMM GRANULOCYTES # BLD AUTO: 0.06 K/UL (ref 0–0.11)
IMM GRANULOCYTES NFR BLD AUTO: 1.8 % (ref 0–0.9)
LYMPHOCYTES # BLD AUTO: 1.16 K/UL (ref 1–4.8)
LYMPHOCYTES NFR BLD: 35.3 % (ref 22–41)
MCH RBC QN AUTO: 29.8 PG (ref 27–33)
MCHC RBC AUTO-ENTMCNC: 31.5 G/DL (ref 33.6–35)
MCV RBC AUTO: 94.7 FL (ref 81.4–97.8)
MONOCYTES # BLD AUTO: 0.48 K/UL (ref 0–0.85)
MONOCYTES NFR BLD AUTO: 14.6 % (ref 0–13.4)
NEUTROPHILS # BLD AUTO: 1.4 K/UL (ref 2–7.15)
NEUTROPHILS NFR BLD: 42.5 % (ref 44–72)
NRBC # BLD AUTO: 0 K/UL
NRBC BLD-RTO: 0 /100 WBC
PLATELET # BLD AUTO: 165 K/UL (ref 164–446)
PMV BLD AUTO: 10.8 FL (ref 9–12.9)
POTASSIUM SERPL-SCNC: 3.7 MMOL/L (ref 3.6–5.5)
POTASSIUM SERPL-SCNC: 3.9 MMOL/L (ref 3.6–5.5)
RBC # BLD AUTO: 3.02 M/UL (ref 4.2–5.4)
SIGNIFICANT IND 70042: NORMAL
SITE SITE: NORMAL
SODIUM SERPL-SCNC: 141 MMOL/L (ref 135–145)
SODIUM SERPL-SCNC: 142 MMOL/L (ref 135–145)
SOURCE SOURCE: NORMAL
WBC # BLD AUTO: 3.3 K/UL (ref 4.8–10.8)

## 2019-01-03 PROCEDURE — 700101 HCHG RX REV CODE 250: Performed by: HOSPITALIST

## 2019-01-03 PROCEDURE — 72148 MRI LUMBAR SPINE W/O DYE: CPT

## 2019-01-03 PROCEDURE — 99232 SBSQ HOSP IP/OBS MODERATE 35: CPT | Performed by: INTERNAL MEDICINE

## 2019-01-03 PROCEDURE — 770006 HCHG ROOM/CARE - MED/SURG/GYN SEMI*

## 2019-01-03 PROCEDURE — 80048 BASIC METABOLIC PNL TOTAL CA: CPT

## 2019-01-03 PROCEDURE — 700111 HCHG RX REV CODE 636 W/ 250 OVERRIDE (IP)

## 2019-01-03 PROCEDURE — 700111 HCHG RX REV CODE 636 W/ 250 OVERRIDE (IP): Performed by: HOSPITALIST

## 2019-01-03 PROCEDURE — 700102 HCHG RX REV CODE 250 W/ 637 OVERRIDE(OP): Performed by: HOSPITALIST

## 2019-01-03 PROCEDURE — 85025 COMPLETE CBC W/AUTO DIFF WBC: CPT

## 2019-01-03 PROCEDURE — A9270 NON-COVERED ITEM OR SERVICE: HCPCS | Performed by: HOSPITALIST

## 2019-01-03 PROCEDURE — 01922 ANES N-INVAS IMG/RADJ THER: CPT

## 2019-01-03 PROCEDURE — 160002 HCHG RECOVERY MINUTES (STAT)

## 2019-01-03 RX ORDER — HALOPERIDOL 5 MG/ML
1 INJECTION INTRAMUSCULAR
Status: DISCONTINUED | OUTPATIENT
Start: 2019-01-03 | End: 2019-01-03 | Stop reason: HOSPADM

## 2019-01-03 RX ORDER — DIPHENHYDRAMINE HYDROCHLORIDE 50 MG/ML
12.5 INJECTION INTRAMUSCULAR; INTRAVENOUS
Status: DISCONTINUED | OUTPATIENT
Start: 2019-01-03 | End: 2019-01-03 | Stop reason: HOSPADM

## 2019-01-03 RX ORDER — ONDANSETRON 2 MG/ML
4 INJECTION INTRAMUSCULAR; INTRAVENOUS
Status: DISCONTINUED | OUTPATIENT
Start: 2019-01-03 | End: 2019-01-03 | Stop reason: HOSPADM

## 2019-01-03 RX ADMIN — HEPARIN SODIUM 5000 UNITS: 5000 INJECTION, SOLUTION INTRAVENOUS; SUBCUTANEOUS at 05:19

## 2019-01-03 RX ADMIN — STANDARDIZED SENNA CONCENTRATE AND DOCUSATE SODIUM 2 TABLET: 8.6; 5 TABLET, FILM COATED ORAL at 18:22

## 2019-01-03 RX ADMIN — OXYCODONE HYDROCHLORIDE 20 MG: 10 TABLET ORAL at 18:22

## 2019-01-03 RX ADMIN — OXYCODONE HYDROCHLORIDE AND ACETAMINOPHEN 500 MG: 500 TABLET ORAL at 05:18

## 2019-01-03 RX ADMIN — TRAZODONE HYDROCHLORIDE 50 MG: 50 TABLET ORAL at 20:34

## 2019-01-03 RX ADMIN — SEVELAMER CARBONATE 2400 MG: 800 TABLET, FILM COATED ORAL at 18:22

## 2019-01-03 RX ADMIN — LACOSAMIDE 100 MG: 50 TABLET, FILM COATED ORAL at 18:22

## 2019-01-03 RX ADMIN — HEPARIN SODIUM 5000 UNITS: 5000 INJECTION, SOLUTION INTRAVENOUS; SUBCUTANEOUS at 20:34

## 2019-01-03 RX ADMIN — LIDOCAINE 1 PATCH: 50 PATCH TOPICAL at 05:19

## 2019-01-03 RX ADMIN — Medication 325 MG: at 05:18

## 2019-01-03 RX ADMIN — HYDROXYCHLOROQUINE SULFATE 200 MG: 200 TABLET, FILM COATED ORAL at 20:34

## 2019-01-03 RX ADMIN — STANDARDIZED SENNA CONCENTRATE AND DOCUSATE SODIUM 2 TABLET: 8.6; 5 TABLET, FILM COATED ORAL at 05:15

## 2019-01-03 RX ADMIN — LACOSAMIDE 100 MG: 50 TABLET, FILM COATED ORAL at 05:18

## 2019-01-03 RX ADMIN — OMEPRAZOLE 20 MG: 20 CAPSULE, DELAYED RELEASE ORAL at 05:17

## 2019-01-03 RX ADMIN — Medication 200 UNITS: at 05:20

## 2019-01-03 RX ADMIN — OXYCODONE HYDROCHLORIDE 20 MG: 10 TABLET ORAL at 04:00

## 2019-01-03 RX ADMIN — OXYCODONE HYDROCHLORIDE 20 MG: 10 TABLET ORAL at 10:23

## 2019-01-03 ASSESSMENT — PAIN SCALES - GENERAL
PAINLEVEL_OUTOF10: 0
PAINLEVEL_OUTOF10: 3
PAINLEVEL_OUTOF10: 7
PAINLEVEL_OUTOF10: 6
PAINLEVEL_OUTOF10: 0
PAINLEVEL_OUTOF10: 0
PAINLEVEL_OUTOF10: 2
PAINLEVEL_OUTOF10: 0
PAINLEVEL_OUTOF10: 7
PAINLEVEL_OUTOF10: 0

## 2019-01-03 ASSESSMENT — ENCOUNTER SYMPTOMS
BACK PAIN: 1
DEPRESSION: 1
SPUTUM PRODUCTION: 0
FEVER: 0
PALPITATIONS: 0
WEAKNESS: 1
COUGH: 0
HEMOPTYSIS: 0

## 2019-01-03 NOTE — PROGRESS NOTES
[facility] Medicine Daily Progress Note    Date of Service  1/2/2019    Chief Complaint  38 y.o. female admitted 1/1/2019 with weakness and low back pain    Hospital Course    38 y.o. female who presented 1/1/2019 with missing dialysis due to weakness and low back pain.  The patient was recently admitted in the beginning of December 2018 and discharged 1 week before [place] for new onset of seizure disorder.  The patient was placed on Vimpat as well as Keppra.  The patient since then however has been having worsening back pain and has not been able to get out of bed.  The patient so has been missing dialysis and her renal functions have worsened.  The patient has end-stage renal disease secondary to systematic lupus erythematosus.  At this point in time the patient will be admitted to the hospital for acute dialysis.  We will try to control her pain and evaluate her low back with an MRI study using anesthesia due to claustrophobia.  The patient at this point will be valid by physical therapy and occupational therapy and I have asked case management assistance with placing the patient to an extended care facility for rehab.        Interval Problem Update  1/2/19:  Discussed the patient's case with neurologist.  Recommendation is to have the patient setup a follow up appointment with Dr. Flores in outpatient.    The patient states that she is tired after HD.   Otherwise, no other acute issue.    Consultants/Specialty  Nephrologist  Neurologist    Code Status  Full    Disposition  Likely rehab versus group home.    Review of Systems  Review of Systems   Respiratory: Negative for cough, hemoptysis and sputum production.    Cardiovascular: Negative for chest pain and palpitations.   Musculoskeletal: Positive for back pain.   Neurological: Positive for weakness.   Psychiatric/Behavioral: Positive for depression.        Physical Exam  Temp:  [36.7 °C (98.1 °F)-37.4 °C (99.4 °F)] 36.7 °C (98.1 °F)  Pulse:  [87-93] 93  Resp:   [16-18] 17  BP: (132-179)/() 132/89    Physical Exam   Constitutional: She is oriented to person, place, and time. No distress.   HENT:   Head: Normocephalic and atraumatic.   Mouth/Throat: No oropharyngeal exudate.   Eyes: Pupils are equal, round, and reactive to light. Conjunctivae are normal. Right eye exhibits no discharge. Left eye exhibits no discharge.   Neck: Neck supple. No tracheal deviation present. No thyromegaly present.   Cardiovascular: Normal rate and regular rhythm.    Pulmonary/Chest: Effort normal and breath sounds normal. No respiratory distress.   Abdominal: Soft. Bowel sounds are normal. She exhibits no distension.   Musculoskeletal: Normal range of motion. She exhibits no edema.   Neurological: She is alert and oriented to person, place, and time. No cranial nerve deficit.   Skin: Skin is warm and dry. She is not diaphoretic. No erythema.   Psychiatric:   depressed       Fluids    Intake/Output Summary (Last 24 hours) at 01/02/19 2035  Last data filed at 01/02/19 1618   Gross per 24 hour   Intake              960 ml   Output             2500 ml   Net            -1540 ml       Laboratory  Recent Labs      01/01/19   0645   WBC  6.2   RBC  2.58*   HEMOGLOBIN  7.8*   HEMATOCRIT  24.0*   MCV  93.0   MCH  30.2   MCHC  32.5*   RDW  45.9   PLATELETCT  154*   MPV  10.6     Recent Labs      01/01/19   0645  01/02/19   0700   SODIUM  138  141   POTASSIUM  5.2  3.3*   CHLORIDE  100  106   CO2  16*  21   GLUCOSE  88  91   BUN  96*  42*   CREATININE  15.98*  8.66*   CALCIUM  8.6  7.9*                   Imaging   MR-LUMBAR SPINE-W/O    (Results Pending)        Assessment/Plan  Medical non-compliance- (present on admission)   Assessment & Plan    Patient at this point has been counseled on compliance she says she does not want to be noncompliant on purpose.     ESRD (end stage renal disease) on dialysis (HCC)- (present on admission)   Assessment & Plan    Patient has not been able to attend his  dialysis due to the fact that she has worsening back pain that prevented her from getting out of bed.  At this point her creatinine is elevated to 15.98 with a BUN of 96 and a bicarb of 16.  I have consulted nephrology and they will get her on the schedule for dialysis right away.  The patient's debility and back pain at this point prevents her from going home immediately and doing dialysis that she will need at this point a skilled nursing facility for rehab prior to going home.  I have spoken with the  who at this point has researched that she does have at this point a qualifying stay from her recent ICU admission and thus can be sent to an extended care facility.     Failure to thrive in adult   Assessment & Plan    PT OT evaluation and skilled placement will be needed     Hypertension- (present on admission)   Assessment & Plan    Continue with blood pressure management optimization keep systolic blood pressure under 140 diastolic under 90     Lupus (systemic lupus erythematosus) ([facility])- (present on admission)   Assessment & Plan    SLE currently under control continue at this point with pain management     Chronic lupus nephritis ([facility])- (present on admission)   Assessment & Plan    Continue with monitoring at this point for further deterioration and have PT OT evaluation done     DAVI (obstructive sleep apnea)- (present on admission)   Assessment & Plan    Patient will benefit from nighttime oxygen support.     Anemia in chronic kidney disease, on chronic dialysis ([facility])   Assessment & Plan    Check at this point iron panel, B12 level, folic acid level.     Chronic pain disorder- (present on admission)   Assessment & Plan    Continue with pain management optimization.  Pending MRI of the lower back with anesthesia due to patient's claustrophobia.  This will determine if the patient does have some sort of necessity for intervention of her low back as she has not been able to get out of bed due to the  low back pain.     Obesity (BMI 30-39.9)- (present on admission)   Assessment & Plan    Patient will need outpatient weight loss management program including bariatric clinic evaluation.     Depression- (present on admission)   Assessment & Plan    Chronic but seems to be controlled.     Low back pain- (present on admission)   Assessment & Plan    Evaluate with MRI using anesthesia due to claustrophobia.  The back pain at this point is exacerbated compared to previous status.  The patient denies dysuria or fever.  She was started on merrem for presumed UTI given presence of wbc in urine. Will stop merrem for now given there is no clear evidence of current ESBL UTI and merrem can lead to seizure.     Thrombocytopenia (CMS-HCC)- (present on admission)   Assessment & Plan    Chronic, stable, patient currently is not bleeding.  She does not have any petechiae.  This is most likely secondary to the SLE.     Anxiety- (present on admission)   Assessment & Plan    Patient has chronic anxiety she is apparently allergic to Ativan though.     Fibromyalgia- (present on admission)   Assessment & Plan    Continue with pain management optimization.          VTE prophylaxis: Heparin

## 2019-01-03 NOTE — PROGRESS NOTES
Nephrology Daily Progress Note    Date of Service  1/3/2019    Chief Complaint  38 y.o. female admitted 1/1/2019 with ESRD, missed HD    Interval Problem Update  HD on a MWF schedule  Noted plans for an MRI with sedation today    Review of Systems  Review of Systems   Constitutional: Negative for fever.   Musculoskeletal: Positive for back pain.   All other systems reviewed and are negative.       Physical Exam  Temp:  [36.3 °C (97.4 °F)-36.8 °C (98.3 °F)] 36.8 °C (98.2 °F)  Pulse:  [64-93] 64  Resp:  [17-18] 18  BP: (113-141)/(66-96) 140/96    Physical Exam   Constitutional: She is oriented to person, place, and time. No distress.   HENT:   Head: Normocephalic and atraumatic.   Cardiovascular: Normal rate and regular rhythm.    Pulmonary/Chest: Effort normal and breath sounds normal.   Neurological: She is alert and oriented to person, place, and time.   Skin: Skin is warm and dry. She is not diaphoretic.       Fluids    Intake/Output Summary (Last 24 hours) at 01/03/19 1524  Last data filed at 01/03/19 0800   Gross per 24 hour   Intake              480 ml   Output                0 ml   Net              480 ml       Laboratory  Recent Labs      01/01/19   0645  01/03/19   0530   WBC  6.2  3.3*   RBC  2.58*  3.02*   HEMOGLOBIN  7.8*  9.0*   HEMATOCRIT  24.0*  28.6*   MCV  93.0  94.7   MCH  30.2  29.8   MCHC  32.5*  31.5*   RDW  45.9  45.1   PLATELETCT  154*  165   MPV  10.6  10.8     Recent Labs      01/02/19   0700  01/03/19   0530  01/03/19   1015   SODIUM  141  141  142   POTASSIUM  3.3*  3.7  3.9   CHLORIDE  106  102  102   CO2  21  27  28   GLUCOSE  91  91  85   BUN  42*  28*  30*   CREATININE  8.66*  6.27*  7.14*   CALCIUM  7.9*  8.8  9.1                   Imaging  MR-LUMBAR SPINE-W/O   Final Result      1.  Diffusely decreased signal again seen throughout the marrow space consistent with red marrow conversion, likely secondary to chronic anemia.   2.  No significant disc bulging, central canal narrowing, or  foraminal narrowing.   3.  No lumbar spine fracture or subluxation.           Assessment/Plan  1. ESRD - HD on MWF schedule, continue  2. Back pain - MRI today  3. Anemia - epogen    -HD on a MWF schedule  -Outpatient HD when discharged

## 2019-01-03 NOTE — CARE PLAN
Problem: Pain Management  Goal: Pain level will decrease to patient's comfort goal    Intervention: Follow pain managment plan developed in collaboration with patient and Interdisciplinary Team  Educated pt to verbalize when pain is not tolerable      Problem: Mobility  Goal: Risk for activity intolerance will decrease    Intervention: Assess and monitor signs of activity intolerance  Educated pt to call for assistance before getting OOB

## 2019-01-03 NOTE — DIETARY
"Nutrition Services: Day 1 of admit.  Debby Carrizales is a 38 y.o. female with admitting DX of Failure to thrive in adult, Anemia of renal disease, SLE (systemic lupus erythematosus), End-stage renal disease due to systemic lupus erythematosus  Consult received for Failure to Thrive + Supplements & Wt Loss on Nutrition Admit Screen     Assessment:  Ht: 165.1 cm, Wt 1/3: 77.3 kg via stand up scale, BMI: 28.36  Diet: NPO x1 day. Pt was previously on renal diet and per chart pt PO 50-75% 2 meals documented.     Evaluation:   1. Attempted to visit pt, pt was out of room for MRI with sedation.   2. Per chart review pt's wt on 12/5: 82.9 kg via stand up scale. Wt loss percent is 6.8% in a month, which is severe.   3. Per H&P pt has been missing dialysis. Per MD note 1/2: \"Patient has not been able to attend his dialysis due to the fact that she has worsening back pain that prevented her from getting out of bed\".  4. Labs: BUN 30, Creatinine 7.14  5. Meds: ascorbic acid, epogen, ferrous sulfate, prilosec, pericolace, renvela, desyrel, vitamin D    Recommendations/Plan:  1. Resume PO diet when medically feasible.    2. Encourage intake of meals  3. Document intake of all meals as % taken in ADL's to provide interdisciplinary communication across all shifts.   4. Monitor weight.  5. Nutrition rep will continue to see patient for ongoing meal and snack preferences.  6. Obtain supplement order per RD as needed.    RD following    "

## 2019-01-03 NOTE — PROGRESS NOTES
HEMODIALYSIS NOTES:     HD today x 3 hours per Dr. Gotti. Initiated at 1205 and ended at 1506. Patient tolerated treatment. Please see paper flowsheet for details.    UF Net: 2000 mL    Blood returned. Applied gauze and held R AVF site for 10 minutes. Verified no bleeding. Bruit and thrill present post dialysis. Instructions given to Primary RN that if bleeding occurs on the AVF site, change dressing and held the site with pressure.     Report given to TRUONG Daly RN.

## 2019-01-03 NOTE — CARE PLAN
Problem: Nutritional:  Goal: Achieve adequate nutritional intake  Patient will resume diet and consume >50% of meals  Outcome: PROGRESSING SLOWER THAN EXPECTED

## 2019-01-03 NOTE — OR NURSING
1404 Patient arrived to unit, awake and alert.  Patient denies pain or nausea at this time.  Updated on plan of care, verbalized understanding.  1428 Patient tolerating oral intake, denies pain or nausea.  1429 Report to Sarah FINCH.  1445 Patient transferred to [place] [id number] via [facility] with transport.

## 2019-01-03 NOTE — PROGRESS NOTES
Report received from day RN, assumed Care.   Patient is AOx4, responds appropriately.       Pain controlled at this time. Pt complaining of generalized weakness.  Patient is tolerating renal diet, denies nausea/vomiting. +flatus (last BM 1/2),  Up self with steady gait.     L upper chest port. R upper arm fistula, bruit and thrill present.     Plan of care discussed, all questions answered.    Explained importance of calling before getting OOB and pt verbalizes understanding.        Call light and belongings within reach, treaded slipper socks on, bed in lowest locked position.  Hourly rounding in place, all needs met at this time

## 2019-01-03 NOTE — PROGRESS NOTES
Hospital Medicine Daily Progress Note    Date of Service  1/3/2019    Chief Complaint  38 y.o. female admitted 1/1/2019 with weakness and low back pain    Hospital Course    38 y.o. female who presented 1/1/2019 with missing dialysis due to weakness and low back pain.  The patient was recently admitted in the beginning of December 2018 and discharged 1 week before [place] for new onset of seizure disorder.  The patient was placed on Vimpat as well as Keppra.  The patient since then however has been having worsening back pain and has not been able to get out of bed.  The patient so has been missing dialysis and her renal functions have worsened.  The patient has end-stage renal disease secondary to systematic lupus erythematosus.  At this point in time the patient will be admitted to the hospital for acute dialysis.  We will try to control her pain and evaluate her low back with an MRI study using anesthesia due to claustrophobia.  The patient at this point will be valid by physical therapy and occupational therapy and I have asked case management assistance with placing the patient to an extended care facility for rehab.        Interval Problem Update  1/2/19:  Discussed the patient's case with neurologist.  Recommendation is to have the patient setup a follow up appointment with Dr. Flores in outpatient.    The patient states that she is tired after HD.   Otherwise, no other acute issue.  1/3/19:  The patient willhave MRI of L-spine under anesthesia done today.   She is able to get in and out of bed on her own.   Reviewed PT/OT notes. Request psychiatrist consult for cognitive evaluation and depression treatment.    Consultants/Specialty  Nephrologist  Neurologist  Psychiatrist    Code Status  Full    Disposition  Likely rehab versus group home.    Review of Systems  Review of Systems   Respiratory: Negative for cough, hemoptysis and sputum production.    Cardiovascular: Negative for chest pain and palpitations.    Musculoskeletal: Positive for back pain.   Neurological: Positive for weakness.   Psychiatric/Behavioral: Positive for depression.        Physical Exam  Temp:  [36.3 °C (97.4 °F)-36.8 °C (98.3 °F)] 36.6 °C (97.8 °F)  Pulse:  [86-93] 86  Resp:  [17-18] 17  BP: (113-150)/(66-96) 140/96    Physical Exam   Constitutional: She is oriented to person, place, and time. No distress.   HENT:   Head: Normocephalic and atraumatic.   Mouth/Throat: No oropharyngeal exudate.   Eyes: Pupils are equal, round, and reactive to light. Conjunctivae are normal. Right eye exhibits no discharge. Left eye exhibits no discharge.   Neck: Neck supple. No tracheal deviation present. No thyromegaly present.   Cardiovascular: Normal rate and regular rhythm.    Pulmonary/Chest: Effort normal and breath sounds normal. No respiratory distress.   Abdominal: Soft. Bowel sounds are normal. She exhibits no distension.   Musculoskeletal: Normal range of motion. She exhibits no edema.   Neurological: She is alert and oriented to person, place, and time. No cranial nerve deficit.   Skin: Skin is warm and dry. She is not diaphoretic. No erythema.   Psychiatric:   depressed       Fluids    Intake/Output Summary (Last 24 hours) at 01/03/19 0847  Last data filed at 01/03/19 0400   Gross per 24 hour   Intake              980 ml   Output             2500 ml   Net            -1520 ml       Laboratory  Recent Labs      01/01/19   0645  01/03/19   0530   WBC  6.2  3.3*   RBC  2.58*  3.02*   HEMOGLOBIN  7.8*  9.0*   HEMATOCRIT  24.0*  28.6*   MCV  93.0  94.7   MCH  30.2  29.8   MCHC  32.5*  31.5*   RDW  45.9  45.1   PLATELETCT  154*  165   MPV  10.6  10.8     Recent Labs      01/01/19   0645  01/02/19   0700  01/03/19   0530   SODIUM  138  141  141   POTASSIUM  5.2  3.3*  3.7   CHLORIDE  100  106  102   CO2  16*  21  27   GLUCOSE  88  91  91   BUN  96*  42*  28*   CREATININE  15.98*  8.66*  6.27*   CALCIUM  8.6  7.9*  8.8                   Imaging   MR-LUMBAR  SPINE-W/O    (Results Pending)        Assessment/Plan  Medical non-compliance- (present on admission)   Assessment & Plan    Patient at this point has been counseled on compliance she says she does not want to be noncompliant on purpose.     ESRD (end stage renal disease) on dialysis ([facility])- (present on admission)   Assessment & Plan    Patient has not been able to attend his dialysis due to the fact that she has worsening back pain that prevented her from getting out of bed.  At this point her creatinine is elevated to 15.98 with a BUN of 96 and a bicarb of 16.  I have consulted nephrology and they will get her on the schedule for dialysis right away.   Continue HD per nephrologist     Failure to thrive in adult   Assessment & Plan    PT OT evaluation done.   Recommendation noted. Psychiatric evaluation.     Hypertension- (present on admission)   Assessment & Plan    Continue with blood pressure management optimization keep systolic blood pressure under 140 diastolic under 90     Lupus (systemic lupus erythematosus) ([facility])- (present on admission)   Assessment & Plan    SLE currently under control continue at this point with pain management     Chronic lupus nephritis ([facility])- (present on admission)   Assessment & Plan    Continue with monitoring at this point for further deterioration and have PT OT evaluation done     DAVI (obstructive sleep apnea)- (present on admission)   Assessment & Plan    Patient will benefit from nighttime oxygen support.     Anemia in chronic kidney disease, on chronic dialysis ([facility])   Assessment & Plan    Check at this point iron panel, B12 level, folic acid level.     Chronic pain disorder- (present on admission)   Assessment & Plan    Continue with pain management optimization.  Pending MRI of the lower back with anesthesia due to patient's claustrophobia.  This will determine if the patient does have some sort of necessity for intervention of her low back as she has not been able to get out  of bed due to the low back pain.     Obesity (BMI 30-39.9)- (present on admission)   Assessment & Plan    Patient will need outpatient weight loss management program including bariatric clinic evaluation.     Depression- (present on admission)   Assessment & Plan    Psych evaluation.  Trazodone qhs.     Low back pain- (present on admission)   Assessment & Plan    Evaluate with MRI using anesthesia due to claustrophobia.  The back pain at this point is exacerbated compared to previous status.  The patient denies dysuria or fever.  She was started on merrem for presumed UTI given presence of wbc in urine. Urine culture shows only mixed javy.  Discontinue all antibiotic.     Thrombocytopenia (CMS-HCC)- (present on admission)   Assessment & Plan    Chronic, stable, patient currently is not bleeding.  She does not have any petechiae.  This is most likely secondary to the SLE.  Improve to normal range on repeat lab.     Anxiety- (present on admission)   Assessment & Plan    Patient has chronic anxiety she is apparently allergic to Ativan though.     Fibromyalgia- (present on admission)   Assessment & Plan    Continue with pain management optimization.          VTE prophylaxis: Heparin

## 2019-01-03 NOTE — PROGRESS NOTES
Bedside report received.  Assessment complete.  A&O x 4. Patient calls appropriately.  Patient up with no assist.   Patient has 2/10 pain. Pain medication will be given when due  Denies N&V. Tolerating NPO diet.  MRI with sedation scheduled for today.  + dialysis, + flatus, + BM.  Patient denies SOB.  Patient in pleasant mood today, knows shes going down for procedure and is NPO.  Review plan with of care with patient. Call light and personal belongings with in reach. Hourly rounding in place. All needs met at this time.

## 2019-01-03 NOTE — CARE PLAN
Problem: Pain Management  Goal: Pain level will decrease to patient's comfort goal  Outcome: PROGRESSING AS EXPECTED    Intervention: Follow pain managment plan developed in collaboration with patient and Interdisciplinary Team  PO Oxy given PRN for pain management, this makes the patients pain tolerable. Will continue to round and intervene when necessary      Problem: Knowledge Deficit  Goal: Knowledge of disease process/condition, treatment plan, diagnostic tests, and medications will improve  Outcome: PROGRESSING AS EXPECTED    Intervention: Explain information regarding disease process/condition, treatment plan, diagnostic tests, and medications and document in education  Patient updated on plan of care. Questions answered, verbalized understanding

## 2019-01-04 LAB
ANISOCYTOSIS BLD QL SMEAR: ABNORMAL
BASOPHILS # BLD AUTO: 0.9 % (ref 0–1.8)
BASOPHILS # BLD: 0.04 K/UL (ref 0–0.12)
EOSINOPHIL # BLD AUTO: 0.1 K/UL (ref 0–0.51)
EOSINOPHIL NFR BLD: 2.6 % (ref 0–6.9)
ERYTHROCYTE [DISTWIDTH] IN BLOOD BY AUTOMATED COUNT: 44.2 FL (ref 35.9–50)
GIANT PLATELETS BLD QL SMEAR: NORMAL
GLUCOSE BLD-MCNC: 85 MG/DL (ref 65–99)
HCT VFR BLD AUTO: 27.7 % (ref 37–47)
HGB BLD-MCNC: 8.8 G/DL (ref 12–16)
LYMPHOCYTES # BLD AUTO: 1 K/UL (ref 1–4.8)
LYMPHOCYTES NFR BLD: 25.7 % (ref 22–41)
MANUAL DIFF BLD: NORMAL
MCH RBC QN AUTO: 30 PG (ref 27–33)
MCHC RBC AUTO-ENTMCNC: 31.8 G/DL (ref 33.6–35)
MCV RBC AUTO: 94.5 FL (ref 81.4–97.8)
MICROCYTES BLD QL SMEAR: ABNORMAL
MONOCYTES # BLD AUTO: 0.24 K/UL (ref 0–0.85)
MONOCYTES NFR BLD AUTO: 6.2 % (ref 0–13.4)
MORPHOLOGY BLD-IMP: NORMAL
MYELOCYTES NFR BLD MANUAL: 0.9 %
NEUTROPHILS # BLD AUTO: 2.48 K/UL (ref 2–7.15)
NEUTROPHILS NFR BLD: 63.7 % (ref 44–72)
NRBC # BLD AUTO: 0 K/UL
NRBC BLD-RTO: 0 /100 WBC
PLATELET # BLD AUTO: 137 K/UL (ref 164–446)
PLATELET BLD QL SMEAR: NORMAL
PMV BLD AUTO: 10.8 FL (ref 9–12.9)
POLYCHROMASIA BLD QL SMEAR: NORMAL
RBC # BLD AUTO: 2.93 M/UL (ref 4.2–5.4)
RBC BLD AUTO: PRESENT
WBC # BLD AUTO: 3.9 K/UL (ref 4.8–10.8)

## 2019-01-04 PROCEDURE — 99223 1ST HOSP IP/OBS HIGH 75: CPT | Performed by: PSYCHIATRY & NEUROLOGY

## 2019-01-04 PROCEDURE — A9270 NON-COVERED ITEM OR SERVICE: HCPCS | Performed by: PSYCHIATRY & NEUROLOGY

## 2019-01-04 PROCEDURE — 82962 GLUCOSE BLOOD TEST: CPT

## 2019-01-04 PROCEDURE — 85027 COMPLETE CBC AUTOMATED: CPT

## 2019-01-04 PROCEDURE — 85007 BL SMEAR W/DIFF WBC COUNT: CPT

## 2019-01-04 PROCEDURE — 5A1D70Z PERFORMANCE OF URINARY FILTRATION, INTERMITTENT, LESS THAN 6 HOURS PER DAY: ICD-10-PCS | Performed by: INTERNAL MEDICINE

## 2019-01-04 PROCEDURE — 700102 HCHG RX REV CODE 250 W/ 637 OVERRIDE(OP): Performed by: HOSPITALIST

## 2019-01-04 PROCEDURE — 700111 HCHG RX REV CODE 636 W/ 250 OVERRIDE (IP): Mod: JG

## 2019-01-04 PROCEDURE — 99232 SBSQ HOSP IP/OBS MODERATE 35: CPT | Performed by: INTERNAL MEDICINE

## 2019-01-04 PROCEDURE — 700102 HCHG RX REV CODE 250 W/ 637 OVERRIDE(OP): Performed by: PSYCHIATRY & NEUROLOGY

## 2019-01-04 PROCEDURE — A9270 NON-COVERED ITEM OR SERVICE: HCPCS | Performed by: HOSPITALIST

## 2019-01-04 PROCEDURE — 700101 HCHG RX REV CODE 250: Performed by: HOSPITALIST

## 2019-01-04 PROCEDURE — 770006 HCHG ROOM/CARE - MED/SURG/GYN SEMI*

## 2019-01-04 PROCEDURE — 700111 HCHG RX REV CODE 636 W/ 250 OVERRIDE (IP): Performed by: HOSPITALIST

## 2019-01-04 PROCEDURE — 90935 HEMODIALYSIS ONE EVALUATION: CPT | Performed by: INTERNAL MEDICINE

## 2019-01-04 PROCEDURE — 90935 HEMODIALYSIS ONE EVALUATION: CPT

## 2019-01-04 RX ORDER — SODIUM CHLORIDE 9 MG/ML
INJECTION, SOLUTION INTRAVENOUS
Status: ACTIVE
Start: 2019-01-04 | End: 2019-01-04

## 2019-01-04 RX ORDER — ARIPIPRAZOLE 10 MG/1
5 TABLET ORAL DAILY
Status: DISCONTINUED | OUTPATIENT
Start: 2019-01-04 | End: 2019-01-09 | Stop reason: HOSPADM

## 2019-01-04 RX ADMIN — ERYTHROPOIETIN 10000 UNITS: 10000 INJECTION, SOLUTION INTRAVENOUS; SUBCUTANEOUS at 12:40

## 2019-01-04 RX ADMIN — OXYCODONE HYDROCHLORIDE 20 MG: 10 TABLET ORAL at 21:37

## 2019-01-04 RX ADMIN — LIDOCAINE 1 PATCH: 50 PATCH TOPICAL at 04:55

## 2019-01-04 RX ADMIN — SEVELAMER CARBONATE 2400 MG: 800 TABLET, FILM COATED ORAL at 07:30

## 2019-01-04 RX ADMIN — CLONIDINE HYDROCHLORIDE 0.1 MG: 0.1 TABLET ORAL at 22:59

## 2019-01-04 RX ADMIN — Medication 325 MG: at 04:54

## 2019-01-04 RX ADMIN — SEVELAMER CARBONATE 2400 MG: 800 TABLET, FILM COATED ORAL at 17:01

## 2019-01-04 RX ADMIN — HEPARIN SODIUM 5000 UNITS: 5000 INJECTION, SOLUTION INTRAVENOUS; SUBCUTANEOUS at 21:35

## 2019-01-04 RX ADMIN — OMEPRAZOLE 20 MG: 20 CAPSULE, DELAYED RELEASE ORAL at 04:54

## 2019-01-04 RX ADMIN — LACOSAMIDE 100 MG: 50 TABLET, FILM COATED ORAL at 17:40

## 2019-01-04 RX ADMIN — OXYCODONE HYDROCHLORIDE AND ACETAMINOPHEN 500 MG: 500 TABLET ORAL at 04:54

## 2019-01-04 RX ADMIN — LACOSAMIDE 100 MG: 50 TABLET, FILM COATED ORAL at 04:54

## 2019-01-04 RX ADMIN — ARIPIPRAZOLE 5 MG: 10 TABLET ORAL at 17:01

## 2019-01-04 RX ADMIN — TRAZODONE HYDROCHLORIDE 50 MG: 50 TABLET ORAL at 21:35

## 2019-01-04 RX ADMIN — HEPARIN SODIUM 5000 UNITS: 5000 INJECTION, SOLUTION INTRAVENOUS; SUBCUTANEOUS at 04:55

## 2019-01-04 RX ADMIN — ENALAPRILAT 1.25 MG: 1.25 INJECTION INTRAVENOUS at 20:17

## 2019-01-04 RX ADMIN — HYDROXYCHLOROQUINE SULFATE 200 MG: 200 TABLET, FILM COATED ORAL at 22:59

## 2019-01-04 ASSESSMENT — PAIN SCALES - GENERAL
PAINLEVEL_OUTOF10: 0
PAINLEVEL_OUTOF10: 6
PAINLEVEL_OUTOF10: 5
PAINLEVEL_OUTOF10: 9
PAINLEVEL_OUTOF10: 0

## 2019-01-04 ASSESSMENT — ENCOUNTER SYMPTOMS
PALPITATIONS: 0
NAUSEA: 0
SHORTNESS OF BREATH: 0
VOMITING: 0
ORTHOPNEA: 0
WEAKNESS: 1
DEPRESSION: 1
FEVER: 0
SPUTUM PRODUCTION: 0
CHILLS: 0
BACK PAIN: 1
WHEEZING: 0
COUGH: 0
HEMOPTYSIS: 0
ABDOMINAL PAIN: 0

## 2019-01-04 NOTE — PSYCHIATRY
"PSYCHIATRIC CONSULTATION:  Reason for admission:   Missed dialysis   Reason for consult: cognitive evaluation, unable to care for herself, depression  Requesting Physician:Irving parmar     Legal status: no hold     Chief Complaint: depression     HPI:   Debby Carrizales is a 38 y.o. year old female with a PMH of ESRD, SLE, who presented to [facility] complaining of missed dialysis due to weakness and low back pain. She was just admitted in December 2018 for seizures. She required anesthesia for the MRI here to evaluate her back due to claustrophobia. She had status epilepticus on EEG 12/13/2018. Of note, she was still on wellbutrin at that time, and it wsa stopped. Currently, she has encephalopathy, suspected by neuro to be related ot polypharmacy and renal disease. Keppra was stopped on 1/1. On 1/2, physical therapy stated \"from a PT perspective, pt has demonstrated a clear pattern of inability to care for herself at home, prticularly in a 2nd story apartment requiring 15 stairs; pt will need a group home environment or long term care facility for optimal medical management/ compliance with HD and mobility. Pt is currently not agreeable; may benefit from cognitive evaluation to ensure she has analytical self care capabilities\". OT also recommends d/c to a group home.     She is neutropenic, anemic (HG 8.8), thrombocytopenic. B12, folate, TSH, wnl. Ferritin elevated, iron low, TIBC low. Mg is elevated. Anion gap was 22 on admission.   MRI on 12/13/2018 shows hippocampal sclerosis and a few nonspecific T2 hhyperintensities in the supratentorial white matter. Of note, hippocampal sclerosis can be associated with temporal lobe seizures and autoimmune encephalopathies.     She is confused slightly on interview. She is very, very depressed. Also reports she has relatively frequent ah and paranoia when she is on steroids; sometimes lasts after the steroids are done for a while. States it is what made her last " "relationship fail. She is somewhat confused, having trouble with attention and word finding, but can stick with a conversation as long as it isn't too uncomfortable. Her cognition appears worse when she is discussing uncomfortable content with me. She misses dancing, singing, avoids these things and even watching these things because it can still make her cry. She doesn't leave the house often. She reports she has resources and she has adpative equipment, she just doesn't want to use it.      She is currently on plaquenil, vimpat, renvala, trazodone, lidocaine patch.   prns include clonidine, phenergan, zofran, oxycodone.   Review of Systems:  Psychiatric:  Depression:      Depressed mood. Very avoidant. No si. Hopeless. Very poor concentration. +memory loss.   Anna:No signs or symptoms   Anxiety/Panic Attacks anxious.   PTSD symptom: intrusive and reexperiencing sx related to hospiatlizations. Nightmares.   Psychosis:+ah.   Other:  Neurologic:  Weakness   Eyes:  No blurry vision   Skin:  No complaints   Musculoskeletal:  Pain   CV:  No cp no palpiatiaton  Lungs:  No sob   GI:  No n/v   :  No issues   Endocrine:    All other systems reviewed and are negative.        Psychiatric Examination: observed phenomenon:  Vitals: /99   Pulse 86   Temp 35.9 °C (96.7 °F) (Temporal)   Resp 17   Ht 1.651 m (5' 5\")   Wt 77.1 kg (169 lb 15.6 oz)   SpO2 93%   Breastfeeding? No   BMI 28.29 kg/m²  Body mass index is 28.29 kg/m².  Musculoskeletal: psychomotor retardation.   Appearance:Grooming wnl . Overweight   Thought Process:pauses frequently, avani when anxious content brought up, states \"i just cant' think right now\"   Thought Contet:  Occasional ah. Occasional paranoia   Speech:Nl tone, rate, and volume. Not pressured. Understandable.  Occasional long pauses   Mood:          depressed  Affect:         constricted  SI/HI:   Denies   Attention/Alertness:   Awake, alert. Attention poor   Memory:    Impaired " "  Orientation:       wnl   Cognition:  Impaired   Insight/Judgement into symptoms:  Impaired   Neurological Testing:          Past Psychiatric Hx:    was seen by Dr Reyes in rehab, and noted she had been seeing an outpatient therapist. He felt she had a pain disorder with bothy medical and psychological factors and adjustment disorder   Prior to : hospitalization at [facility] for depression   3/20/2014 was transferred from [facility] for psychiatric evaluation due to generalized body pain and inability to walk. She reported PTSD from a past hospitalization where she was in a \"coma\". Was noted to have conversion disorder vs. Factitious disorder   3/10/2015 she was noted to have significant anxiety in a medical hospitalization related to her multiple medical illnesses.   2015 Was seen by consult psych team related to pain in her hips, overuse of her opiate pain medications, anxiety. She was diagnosed with an adjustment disorder with depression and anxiety. Felt some of this might have been related to the prednisone she was on at the time.   2016 seen by Dr Giraldo and was very tearful, reported her quality of life was terrible.    2016 seen by Dr Reyes again in rehab after surgery for B avascular necrosis. He did some testing and noted moderate symptoms of mood disturbance and elevated anxiety. He reported she had \"some problems in thinking related to limitation in scope\". She had sleep disturbance, poor apetite, severe chronic pain.     Past psych meds: trazodone, wellbutrin, ambien, valium (also as muscle relaxer), topiramate,   Family Psychiatric Hx:  Denies   Mother  when she was 19    Social Hx:  Social History     Social History   • Marital status: Single     Spouse name: N/A   • Number of children: N/A   • Years of education: N/A     Occupational History   • Not on file.     Social History Main Topics   • Smoking status: Former Smoker     Packs/day: 0.50     Years: " 18.00     Types: Cigarettes     Quit date: 2011   • Smokeless tobacco: Never Used      Comment:  ppd   • Alcohol use No   • Drug use: No   • Sexual activity: Not on file     Other Topics Concern   •  Service No   • Blood Transfusions Yes   • Caffeine Concern No   • Occupational Exposure No   • Hobby Hazards No   • Sleep Concern Yes   • Stress Concern Yes   • Weight Concern Yes   • Special Diet Yes   • Back Care No   • Exercise Yes   • Bike Helmet No   • Seat Belt Yes   • Self-Exams Yes     Social History Narrative   • No narrative on file     Mother  of rheumatic fever when she was 19 and she has mostly been on her own. She has a supportive grandmother. She has multiple friends.   She is on disability.   She lives by herself.   Interestingly, previous notes refer to her as , , [place], half-[place] and half-.  Pt appears to be [place], but this wasn't discussed with patient during this interview. Unclear if the confusion is from providers making assumptions or patient giving different reports. Had a very good singing voice, and this was impacted significantly after she was intubated. She had speech therapy for this.       Drug/Alcohol/Tobacco Hx:   Drugs: runs out of narcotics in advance. Uses marijuana. She has had chornic constipation related to opiate use.    Alcohol: occasional   Tobacco:denies     Medical Hx: labs, MARS, medications, etc were reviewed. Only those findings of potential interest to psychiatry are noted below:    Past Medical History:   Diagnosis Date   • Arthritis     all joints,r/t lupus   • Avascular necrosis of bones of both hips (HCC) 10/10/2016   • Clostridium difficile colitis 5/3/2011   • Dialysis patient    • ESBL (extended spectrum beta-lactamase) producing bacteria infection 2014   • Fibromyalgia    • Hypertension    • Lupus    • Pneumonia    • Psychiatric disorder     anxiety, depression   • Pyelonephritis  11/21/2017   • Renal failure      Past Surgical History:   Procedure Laterality Date   • AV FISTULA REVISION Right 8/11/2018    Procedure: AV FISTULA REVISION- Graft and Thrombectomy;  Surgeon: Shabbir Ardon M.D.;  Location: [facility];  Service: General   • AV FISTULA THROMBOLYSIS Right 8/11/2018    Procedure: AV FISTULA THROMBOLYSIS;  Surgeon: Shabbir Ardon M.D.;  Location: [facility];  Service: General   • AV FISTULA CREATION Right 12/9/2017    Procedure: AV FISTULA CREATION-ARM FOR GRAFT;  Surgeon: Lesly Jansen M.D.;  Location: [facility];  Service: General   • THROMBECTOMY  12/9/2017    Procedure: THROMBECTOMY;  Surgeon: Lesly Jansen M.D.;  Location: [facility];  Service: General   • THROMBECTOMY Right 8/21/2016    Procedure: THROMBECTOMY - right AV fistula graft with grams;  Surgeon: Shabbir Ardon M.D.;  Location: [facility];  Service:    • ANGIOPLASTY BALLOON  8/21/2016    Procedure: ANGIOPLASTY BALLOON;  Surgeon: Shabbir Ardon M.D.;  Location: [facility];  Service:    • THROMBECTOMY Right 8/20/2016    Procedure: THROMBECTOMY AV GRAFT;  Surgeon: Shabbir Ardon M.D.;  Location: [facility];  Service:    • AV FISTULA CREATION Right 7/12/2016    Procedure: AV FISTULA CREATION WITH GRAFT BRACHIAL AXILLARY;  Surgeon: Shabbir Ardon M.D.;  Location: [facility];  Service:    • CLOSED REDUCTION Right 7/5/2016    Procedure: CLOSED REDUCTION- Hip ;  Surgeon: Michael Holman M.D.;  Location: [facility];  Service:    • HIP ARTHROPLASTY TOTAL Right 1/18/2016    Procedure: HIP ARTHROPLASTY TOTAL;  Surgeon: Michael Holman M.D.;  Location: [facility];  Service:    • AV FISTULA CREATION  2/3/2015    Performed by Shabbir Ardon M.D. at [facility]   • AV FISTULA CREATION  11/14/2014    Performed by Shabbir Ardon M.D. at [facility]   •  AV FISTULA CREATION  9/9/2014    Performed by Shabbir Ardon M.D. at SUR[facility]ERY [facility]   • CATH PLACEMENT  9/9/2014    Performed by Shabbir Ardon M.D. at SURGERY [facility]   • OTHER  5/2011    tracheostomy   • GASTROSCOPY-ENDO  4/27/2011    Performed by PALMIRA DOAN at ENDOSCOPY [facility]   • COLONOSCOPY WITH BIOPSY  4/20/2011    Performed by ALCIRA CRUZ at ENDOSCOPY [facility]   • GASTROSCOPY-ENDO  4/18/2011    Performed by ALCIRA CRUZ at ENDOSCOPY [facility]   • GASTROSCOPY-ENDO  4/10/2011    Performed by SYED JURADO at ENDOSCOPY [facility]   • SCLEROTHERAPHY  4/10/2011    Performed by SYED JURADO at ENDOSCOPY [facility]   • OTHER ABDOMINAL SURGERY      kidney biopsy   • TRACHEOSTOMY       AVASCULAR NECROSIS  FIBROMYALGIA       Allergies:   Allergies   Allergen Reactions   • Lorazepam [Ativan] Anxiety     Patient becomes severely paranoid and agitated   • Morphine Itching     Tolerates Dilaudid   • Seasonal Runny Nose and Itching     Hay fever, sabiha brush       Medications:  Current Facility-Administered Medications   Medication Dose Route Frequency Provider Last Rate Last Dose   • heparin pf injection 200 Units  200 Units Intracatheter PRN Toby Leiva M.D.   200 Units at 01/03/19 0520   • epoetin rk (EPOGEN,PROCRIT) injection 10,000 Units  10,000 Units Subcutaneous MO, WE + FR Spike Gotti M.D.       • ascorbic acid tablet 500 mg  500 mg Oral DAILY Toby Leiva M.D.   500 mg at 01/04/19 0454   • vitamin D (cholecalciferol) tablet 10,000 Units  10,000 Units Oral DAILY oTby Leiva M.D.       • ferrous sulfate tablet 325 mg  325 mg Oral DAILY Toby Leiva M.D.   325 mg at 01/04/19 0454   • hydroxychloroquine (PLAQUENIL) tablet 200 mg  200 mg Oral QHS Toby Leiva M.D.   200 mg at 01/03/19 2034   • lacosamide (VIMPAT) tablet 100 mg  100 mg Oral BID Toby Leiva M.D.   100 mg at 01/04/19 0454   • lidocaine  (LIDODERM) 5 % 1 Patch  1 Patch Transdermal Q24HRS Toby Leiva M.D.   1 Patch at 01/04/19 0455   • omeprazole (PRILOSEC) capsule 20 mg  20 mg Oral DAILY Toby Leiva M.D.   20 mg at 01/04/19 0454   • oxyCODONE immediate release (ROXICODONE) tablet 20 mg  20 mg Oral Q6HRS PRN Toby Leiva M.D.   20 mg at 01/03/19 1822   • sevelamer carbonate (RENVELA) tablet 2,400 mg  2,400 mg Oral TID WITH MEALS Toby Leiva M.D.   2,400 mg at 01/04/19 0730   • traZODone (DESYREL) tablet 50 mg  50 mg Oral QHS Toby Leiva M.D.   50 mg at 01/03/19 2034   • senna-docusate (PERICOLACE or SENOKOT S) 8.6-50 MG per tablet 2 Tab  2 Tab Oral BID Toby Leiva M.D.   2 Tab at 01/03/19 1822    And   • polyethylene glycol/lytes (MIRALAX) PACKET 1 Packet  1 Packet Oral QDAY PRN Toby Leiva M.D.        And   • bisacodyl (DULCOLAX) suppository 10 mg  10 mg Rectal QDAY PRN Toby Leiva M.D.       • heparin injection 5,000 Units  5,000 Units Subcutaneous Q8HRS Toby Leiva M.D.   5,000 Units at 01/04/19 0455   • cloNIDine (CATAPRES) tablet 0.1 mg  0.1 mg Oral Q6HRS PRN Toby Leiva M.D.   0.1 mg at 01/02/19 0339   • enalaprilat (VASOTEC) injection 1.25 mg  1.25 mg Intravenous Q6HRS PRN Toby Leiva M.D.   1.25 mg at 01/02/19 0637   • ondansetron (ZOFRAN) syringe/vial injection 4 mg  4 mg Intravenous Q4HRS PRN Toby Leiva M.D.   4 mg at 01/02/19 1626   • ondansetron (ZOFRAN ODT) dispertab 4 mg  4 mg Oral Q4HRS PRN Toby Leiva M.D.       • promethazine (PHENERGAN) tablet 12.5-25 mg  12.5-25 mg Oral Q4HRS PRN Toby Leiva M.D.       • promethazine (PHENERGAN) suppository 12.5-25 mg  12.5-25 mg Rectal Q4HRS PRN Toby Leiva M.D.       • prochlorperazine (COMPAZINE) injection 5-10 mg  5-10 mg Intravenous Q4HRS PRN Toby Leiva M.D.       • acetaminophen (TYLENOL) tablet 650 mg  650 mg Oral Q6HRS PRN Toby Leiva M.D.           Labs/ Testing:  Recent Results (from the past 48 hour(s))   CBC WITH DIFFERENTIAL     Collection Time: 01/03/19  5:30 AM   Result Value Ref Range    WBC 3.3 (L) 4.8 - 10.8 K/uL    RBC 3.02 (L) 4.20 - 5.40 M/uL    Hemoglobin 9.0 (L) 12.0 - 16.0 g/dL    Hematocrit 28.6 (L) 37.0 - 47.0 %    MCV 94.7 81.4 - 97.8 fL    MCH 29.8 27.0 - 33.0 pg    MCHC 31.5 (L) 33.6 - 35.0 g/dL    RDW 45.1 35.9 - 50.0 fL    Platelet Count 165 164 - 446 K/uL    MPV 10.8 9.0 - 12.9 fL    Neutrophils-Polys 42.50 (L) 44.00 - 72.00 %    Lymphocytes 35.30 22.00 - 41.00 %    Monocytes 14.60 (H) 0.00 - 13.40 %    Eosinophils 4.60 0.00 - 6.90 %    Basophils 1.20 0.00 - 1.80 %    Immature Granulocytes 1.80 (H) 0.00 - 0.90 %    Nucleated RBC 0.00 /100 WBC    Neutrophils (Absolute) 1.40 (L) 2.00 - 7.15 K/uL    Lymphs (Absolute) 1.16 1.00 - 4.80 K/uL    Monos (Absolute) 0.48 0.00 - 0.85 K/uL    Eos (Absolute) 0.15 0.00 - 0.51 K/uL    Baso (Absolute) 0.04 0.00 - 0.12 K/uL    Immature Granulocytes (abs) 0.06 0.00 - 0.11 K/uL    NRBC (Absolute) 0.00 K/uL   BASIC METABOLIC PANEL    Collection Time: 01/03/19  5:30 AM   Result Value Ref Range    Sodium 141 135 - 145 mmol/L    Potassium 3.7 3.6 - 5.5 mmol/L    Chloride 102 96 - 112 mmol/L    Co2 27 20 - 33 mmol/L    Glucose 91 65 - 99 mg/dL    Bun 28 (H) 8 - 22 mg/dL    Creatinine 6.27 (HH) 0.50 - 1.40 mg/dL    Calcium 8.8 8.5 - 10.5 mg/dL    Anion Gap 12.0 (H) 0.0 - 11.9   ESTIMATED GFR    Collection Time: 01/03/19  5:30 AM   Result Value Ref Range    GFR If  9 (A) >60 mL/min/1.73 m 2    GFR If Non  8 (A) >60 mL/min/1.73 m 2   BASIC METABOLIC PANEL    Collection Time: 01/03/19 10:15 AM   Result Value Ref Range    Sodium 142 135 - 145 mmol/L    Potassium 3.9 3.6 - 5.5 mmol/L    Chloride 102 96 - 112 mmol/L    Co2 28 20 - 33 mmol/L    Glucose 85 65 - 99 mg/dL    Bun 30 (H) 8 - 22 mg/dL    Creatinine 7.14 (HH) 0.50 - 1.40 mg/dL    Calcium 9.1 8.5 - 10.5 mg/dL    Anion Gap 12.0 (H) 0.0 - 11.9   ESTIMATED GFR    Collection Time: 01/03/19 10:15 AM   Result Value Ref  Range    GFR If  8 (A) >60 mL/min/1.73 m 2    GFR If Non African American 6 (A) >60 mL/min/1.73 m 2   CBC WITH DIFFERENTIAL    Collection Time: 01/04/19  4:00 AM   Result Value Ref Range    WBC 3.9 (L) 4.8 - 10.8 K/uL    RBC 2.93 (L) 4.20 - 5.40 M/uL    Hemoglobin 8.8 (L) 12.0 - 16.0 g/dL    Hematocrit 27.7 (L) 37.0 - 47.0 %    MCV 94.5 81.4 - 97.8 fL    MCH 30.0 27.0 - 33.0 pg    MCHC 31.8 (L) 33.6 - 35.0 g/dL    RDW 44.2 35.9 - 50.0 fL    Platelet Count 137 (L) 164 - 446 K/uL    MPV 10.8 9.0 - 12.9 fL    Nucleated RBC 0.00 /100 WBC    NRBC (Absolute) 0.00 K/uL    Neutrophils-Polys 63.70 44.00 - 72.00 %    Lymphocytes 25.70 22.00 - 41.00 %    Monocytes 6.20 0.00 - 13.40 %    Eosinophils 2.60 0.00 - 6.90 %    Basophils 0.90 0.00 - 1.80 %    Neutrophils (Absolute) 2.48 2.00 - 7.15 K/uL    Lymphs (Absolute) 1.00 1.00 - 4.80 K/uL    Monos (Absolute) 0.24 0.00 - 0.85 K/uL    Eos (Absolute) 0.10 0.00 - 0.51 K/uL    Baso (Absolute) 0.04 0.00 - 0.12 K/uL    Anisocytosis 1+     Microcytosis 1+    DIFFERENTIAL MANUAL    Collection Time: 01/04/19  4:00 AM   Result Value Ref Range    Myelocytes 0.90 %    Manual Diff Status PERFORMED    PERIPHERAL SMEAR REVIEW    Collection Time: 01/04/19  4:00 AM   Result Value Ref Range    Peripheral Smear Review see below    PLATELET ESTIMATE    Collection Time: 01/04/19  4:00 AM   Result Value Ref Range    Plt Estimation Decreased    MORPHOLOGY    Collection Time: 01/04/19  4:00 AM   Result Value Ref Range    RBC Morphology Present     Giant Platelets 1+     Polychromia 1+        MR-LUMBAR SPINE-W/O   Final Result      1.  Diffusely decreased signal again seen throughout the marrow space consistent with red marrow conversion, likely secondary to chronic anemia.   2.  No significant disc bulging, central canal narrowing, or foraminal narrowing.   3.  No lumbar spine fracture or subluxation.              ASSESSMENT/PLAN:  Depressive disorder unsepcified   Psychosis  unspecified    Psychiatry does not do cognitive evaluations. If the team wants a cognitive eval recommend consulting speech for this.     Pt does appear to have some cognitive deficits currently, but it does appear to be related to depression at least in part.     Starting abilify 5mg daily   Will follow     Thank you for the consult.

## 2019-01-04 NOTE — PROGRESS NOTES
Pt had general anesthesia for MRI with Dr.Catherine Valladares. VSS. Tolerated procedure well. Transferred to [facility] and report given to Renetta FINCH. Port intact.

## 2019-01-04 NOTE — CARE PLAN
Problem: Fluid Volume:  Goal: Will maintain balanced intake and output  Outcome: PROGRESSING AS EXPECTED  Monitoring pt. Intake and output    Problem: Safety  Goal: Will remain free from injury  Outcome: PROGRESSING AS EXPECTED  Bed locked and in lowest position, Non-slid slippers on.  Call light and personal belongings are within reach

## 2019-01-04 NOTE — PSYCHIATRY
BRIEF PSYCHIATRIC CONSULT NOTE: patient seen, full note to follow.    Pt is very depressed. Also reports she has relatively frequent ah and paranoia when she is on steroids; sometimes lasts after the steroids are done for a while. States it is what made her last relationship fail. She is somewhat confused, having trouble with attention and word finding, but can stick with a conversation as long as it isn't too uncomfortable.     Her cognition appears worse when she is discussing uncomfortable content with me.     Recommend starting abilify 5mg po daily  Will follow.

## 2019-01-04 NOTE — PROGRESS NOTES
Nephrology Daily Progress Note    Date of Service  1/4/2019    Chief Complaint  38 y.o. female admitted 1/1/2019 with ESRD, missed HD    Interval Problem Update  HD on a MWF schedule  Seen during dialysis -tolerates well    Review of Systems  Review of Systems   Constitutional: Positive for malaise/fatigue. Negative for chills and fever.   HENT: Negative.    Respiratory: Negative for cough, shortness of breath and wheezing.    Cardiovascular: Negative for chest pain, palpitations and orthopnea.   Gastrointestinal: Negative for abdominal pain, nausea and vomiting.   Genitourinary: Negative for dysuria.   Musculoskeletal: Positive for back pain.   All other systems reviewed and are negative.       Physical Exam  Temp:  [35.9 °C (96.7 °F)-36.8 °C (98.2 °F)] 35.9 °C (96.7 °F)  Pulse:  [64-86] 86  Resp:  [17-18] 17  BP: (135-150)/(85-99) 150/99    Physical Exam   Constitutional: She is oriented to person, place, and time. No distress.   HENT:   Head: Normocephalic and atraumatic.   Eyes: Pupils are equal, round, and reactive to light. Conjunctivae and EOM are normal.   Neck: Normal range of motion. Neck supple.   Cardiovascular: Normal rate and regular rhythm.  Exam reveals no gallop and no friction rub.    Pulmonary/Chest: Effort normal and breath sounds normal. No respiratory distress. She has no wheezes. She has no rales.   Abdominal: Soft. Bowel sounds are normal. She exhibits no distension. There is no tenderness.   Neurological: She is alert and oriented to person, place, and time. No cranial nerve deficit.   Skin: Skin is warm and dry. No rash noted. She is not diaphoretic. No erythema.       Fluids    Intake/Output Summary (Last 24 hours) at 01/04/19 1307  Last data filed at 01/04/19 0840   Gross per 24 hour   Intake              480 ml   Output                0 ml   Net              480 ml       Laboratory  Recent Labs      01/03/19   0530  01/04/19   0400   WBC  3.3*  3.9*   RBC  3.02*  2.93*   HEMOGLOBIN  9.0*   8.8*   HEMATOCRIT  28.6*  27.7*   MCV  94.7  94.5   MCH  29.8  30.0   MCHC  31.5*  31.8*   RDW  45.1  44.2   PLATELETCT  165  137*   MPV  10.8  10.8     Recent Labs      01/02/19   0700  01/03/19   0530  01/03/19   1015   SODIUM  141  141  142   POTASSIUM  3.3*  3.7  3.9   CHLORIDE  106  102  102   CO2  21  27  28   GLUCOSE  91  91  85   BUN  42*  28*  30*   CREATININE  8.66*  6.27*  7.14*   CALCIUM  7.9*  8.8  9.1                   Imaging  MR-LUMBAR SPINE-W/O   Final Result      1.  Diffusely decreased signal again seen throughout the marrow space consistent with red marrow conversion, likely secondary to chronic anemia.   2.  No significant disc bulging, central canal narrowing, or foraminal narrowing.   3.  No lumbar spine fracture or subluxation.           Assessment/Plan  1. ESRD - HD on MWF schedule -please see dialysis flow sheet for details  2. Back pain - MRI negative  3. Anemia - on epogen  Recs:   -HD on a MWF schedule  -renal diet

## 2019-01-04 NOTE — PROGRESS NOTES
[facility] Medicine Daily Progress Note    Date of Service  1/4/2019    Chief Complaint  38 y.o. female admitted 1/1/2019 with weakness and low back pain    Hospital Course    38 y.o. female who presented 1/1/2019 with missing dialysis due to weakness and low back pain.  The patient was recently admitted in the beginning of December 2018 and discharged 1 week before [place] for new onset of seizure disorder.  The patient was placed on Vimpat as well as Keppra.  The patient since then however has been having worsening back pain and has not been able to get out of bed.  The patient so has been missing dialysis and her renal functions have worsened.  The patient has end-stage renal disease secondary to systematic lupus erythematosus.  At this point in time the patient will be admitted to the hospital for acute dialysis.  We will try to control her pain and evaluate her low back with an MRI study using anesthesia due to claustrophobia.  The patient at this point will be valid by physical therapy and occupational therapy and I have asked case management assistance with placing the patient to an extended care facility for rehab.        Interval Problem Update  1/2/19:  Discussed the patient's case with neurologist.  Recommendation is to have the patient setup a follow up appointment with Dr. Flores in outpatient.    The patient states that she is tired after HD.   Otherwise, no other acute issue.  1/3/19:  The patient willhave MRI of L-spine under anesthesia done today.   She is able to get in and out of bed on her own.   Reviewed PT/OT notes. Request psychiatrist consult for cognitive evaluation and depression treatment.  1/4/19:  The patient has no acute event overnight.  Psychiatrist evaluated the patient today.  Discussed the patient's case at length with Dr. Andres, who states the patient is very depressed and recommend starting the patient on Abilify and observe in  hospital.    Consultants/Specialty  Nephrologist  Neurologist  Psychiatrist    Code Status  Full    Disposition  Likely rehab versus group home.    Review of Systems  Review of Systems   Respiratory: Negative for cough, hemoptysis and sputum production.    Cardiovascular: Negative for chest pain and palpitations.   Musculoskeletal: Positive for back pain.   Neurological: Positive for weakness.   Psychiatric/Behavioral: Positive for depression.        Physical Exam  Temp:  [35.9 °C (96.7 °F)-36.8 °C (98.2 °F)] 35.9 °C (96.7 °F)  Pulse:  [64-86] 86  Resp:  [17-18] 17  BP: (135-150)/(85-99) 150/99    Physical Exam   Constitutional: She is oriented to person, place, and time. No distress.   HENT:   Head: Normocephalic and atraumatic.   Mouth/Throat: No oropharyngeal exudate.   Eyes: Pupils are equal, round, and reactive to light. Conjunctivae are normal. Right eye exhibits no discharge. Left eye exhibits no discharge.   Neck: Neck supple. No tracheal deviation present. No thyromegaly present.   Cardiovascular: Normal rate and regular rhythm.    Pulmonary/Chest: Effort normal and breath sounds normal. No respiratory distress.   Abdominal: Soft. Bowel sounds are normal. She exhibits no distension.   Musculoskeletal: Normal range of motion. She exhibits no edema.   Neurological: She is alert and oriented to person, place, and time. No cranial nerve deficit.   Skin: Skin is warm and dry. She is not diaphoretic. No erythema.   Psychiatric:   depressed       Fluids    Intake/Output Summary (Last 24 hours) at 01/04/19 1011  Last data filed at 01/04/19 0400   Gross per 24 hour   Intake              360 ml   Output                0 ml   Net              360 ml       Laboratory  Recent Labs      01/03/19   0530  01/04/19   0400   WBC  3.3*  3.9*   RBC  3.02*  2.93*   HEMOGLOBIN  9.0*  8.8*   HEMATOCRIT  28.6*  27.7*   MCV  94.7  94.5   MCH  29.8  30.0   MCHC  31.5*  31.8*   RDW  45.1  44.2   PLATELETCT  165  137*   MPV  10.8   10.8     Recent Labs      01/02/19   0700  01/03/19   0530  01/03/19   1015   SODIUM  141  141  142   POTASSIUM  3.3*  3.7  3.9   CHLORIDE  106  102  102   CO2  21  27  28   GLUCOSE  91  91  85   BUN  42*  28*  30*   CREATININE  8.66*  6.27*  7.14*   CALCIUM  7.9*  8.8  9.1                   Imaging   MR-LUMBAR SPINE-W/O   Final Result      1.  Diffusely decreased signal again seen throughout the marrow space consistent with red marrow conversion, likely secondary to chronic anemia.   2.  No significant disc bulging, central canal narrowing, or foraminal narrowing.   3.  No lumbar spine fracture or subluxation.           Assessment/Plan  Medical non-compliance- (present on admission)   Assessment & Plan    Patient at this point has been counseled on compliance she says she does not want to be noncompliant on purpose.  The patient's non-compliance seems to be secondary to her major depression.      ESRD (end stage renal disease) on dialysis ([facility])- (present on admission)   Assessment & Plan    Patient has not been able to attend his dialysis due to the fact that she has worsening back pain that prevented her from getting out of bed.  At this point her creatinine is elevated to 15.98 with a BUN of 96 and a bicarb of 16.  I have consulted nephrology and they will get her on the schedule for dialysis right away.   Continue HD per nephrologist     Failure to thrive in adult   Assessment & Plan    PT OT evaluation done.   Recommendation noted. Psychiatric evaluation.     Hypertension- (present on admission)   Assessment & Plan    Continue with blood pressure management optimization keep systolic blood pressure under 140 diastolic under 90     Lupus (systemic lupus erythematosus) ([facility])- (present on admission)   Assessment & Plan    SLE currently under control continue at this point with pain management     Chronic lupus nephritis (HCC)- (present on admission)   Assessment & Plan    Continue with monitoring at this point for  further deterioration and have PT OT evaluation done     DAVI (obstructive sleep apnea)- (present on admission)   Assessment & Plan    Patient will benefit from nighttime oxygen support.     Anemia in chronic kidney disease, on chronic dialysis (HCC)   Assessment & Plan    Check at this point iron panel, B12 level, folic acid level.     Chronic pain disorder- (present on admission)   Assessment & Plan    Continue with pain management optimization.  Pending MRI of the lower back with anesthesia due to patient's claustrophobia.  This will determine if the patient does have some sort of necessity for intervention of her low back as she has not been able to get out of bed due to the low back pain.     Obesity (BMI 30-39.9)- (present on admission)   Assessment & Plan    Patient will need outpatient weight loss management program including bariatric clinic evaluation.     Depression- (present on admission)   Assessment & Plan    Trazodone qhs.  Psych evaluation done.  Starting on Abilify today.  Monitor for any side effects.  If no adverse issues, will consider discharge in the next 24 to 48 hours.  In any case, the patient will need to follow psychiatrist within one week.     Low back pain- (present on admission)   Assessment & Plan    Evaluate with MRI using anesthesia due to claustrophobia.  The back pain at this point is exacerbated compared to previous status.  The patient denies dysuria or fever.  She was started on merrem for presumed UTI given presence of wbc in urine. Urine culture shows only mixed javy.  Discontinue all antibiotic.  MRI does not indicates severe spinal stenosis.  The patient is walking on her own.     Thrombocytopenia (CMS-HCC)- (present on admission)   Assessment & Plan    Chronic, stable, patient currently is not bleeding.  She does not have any petechiae.  This is most likely secondary to the SLE.  Improve to normal range on repeat lab.     Anxiety- (present on admission)   Assessment & Plan     Patient has chronic anxiety she is apparently allergic to Ativan though.     Fibromyalgia- (present on admission)   Assessment & Plan    Continue with pain management optimization.          VTE prophylaxis: Heparin

## 2019-01-04 NOTE — PROGRESS NOTES
Bedside report received.  Assessment complete.  A&O x 4. Patient calls appropriately.  Patient up self  Patient has 7/10 pain, denies interventions   Denies N&V. Tolerating renal diet.  + void, periodically, dialysis pt., + flatus, + BM.  Patient denies SOB.  Patient pleasant with staff and resting in bed.  Review plan with of care with patient. Call light and personal belongings with in reach. Hourly rounding in place. All needs met at this time.

## 2019-01-04 NOTE — CARE PLAN
Problem: Psychosocial Needs:  Goal: Level of anxiety will decrease  Outcome: PROGRESSING AS EXPECTED  Encourage to voice feelings, activities, emotional support    Problem: Infection  Goal: Will remain free from infection  Outcome: PROGRESSING AS EXPECTED  Hand hygiene and infection prevention education

## 2019-01-05 PROBLEM — G40.909 SEIZURE DISORDER (HCC): Status: ACTIVE | Noted: 2019-01-05

## 2019-01-05 LAB
ANION GAP SERPL CALC-SCNC: 10 MMOL/L (ref 0–11.9)
BASOPHILS # BLD AUTO: 0.8 % (ref 0–1.8)
BASOPHILS # BLD: 0.04 K/UL (ref 0–0.12)
BUN SERPL-MCNC: 21 MG/DL (ref 8–22)
CALCIUM SERPL-MCNC: 10 MG/DL (ref 8.5–10.5)
CHLORIDE SERPL-SCNC: 102 MMOL/L (ref 96–112)
CO2 SERPL-SCNC: 27 MMOL/L (ref 20–33)
CREAT SERPL-MCNC: 6.12 MG/DL (ref 0.5–1.4)
EOSINOPHIL # BLD AUTO: 0.12 K/UL (ref 0–0.51)
EOSINOPHIL NFR BLD: 2.3 % (ref 0–6.9)
ERYTHROCYTE [DISTWIDTH] IN BLOOD BY AUTOMATED COUNT: 42.1 FL (ref 35.9–50)
GLUCOSE SERPL-MCNC: 91 MG/DL (ref 65–99)
HCT VFR BLD AUTO: 31.5 % (ref 37–47)
HGB BLD-MCNC: 10 G/DL (ref 12–16)
IMM GRANULOCYTES # BLD AUTO: 0.05 K/UL (ref 0–0.11)
IMM GRANULOCYTES NFR BLD AUTO: 1 % (ref 0–0.9)
LYMPHOCYTES # BLD AUTO: 1.24 K/UL (ref 1–4.8)
LYMPHOCYTES NFR BLD: 23.7 % (ref 22–41)
MCH RBC QN AUTO: 29.4 PG (ref 27–33)
MCHC RBC AUTO-ENTMCNC: 31.7 G/DL (ref 33.6–35)
MCV RBC AUTO: 92.6 FL (ref 81.4–97.8)
MONOCYTES # BLD AUTO: 0.46 K/UL (ref 0–0.85)
MONOCYTES NFR BLD AUTO: 8.8 % (ref 0–13.4)
NEUTROPHILS # BLD AUTO: 3.32 K/UL (ref 2–7.15)
NEUTROPHILS NFR BLD: 63.4 % (ref 44–72)
NRBC # BLD AUTO: 0 K/UL
NRBC BLD-RTO: 0 /100 WBC
PLATELET # BLD AUTO: 161 K/UL (ref 164–446)
PMV BLD AUTO: 11 FL (ref 9–12.9)
POTASSIUM SERPL-SCNC: 4.1 MMOL/L (ref 3.6–5.5)
RBC # BLD AUTO: 3.4 M/UL (ref 4.2–5.4)
SODIUM SERPL-SCNC: 139 MMOL/L (ref 135–145)
WBC # BLD AUTO: 5.2 K/UL (ref 4.8–10.8)

## 2019-01-05 PROCEDURE — 99232 SBSQ HOSP IP/OBS MODERATE 35: CPT | Performed by: INTERNAL MEDICINE

## 2019-01-05 PROCEDURE — A9270 NON-COVERED ITEM OR SERVICE: HCPCS | Performed by: HOSPITALIST

## 2019-01-05 PROCEDURE — 700111 HCHG RX REV CODE 636 W/ 250 OVERRIDE (IP): Performed by: HOSPITALIST

## 2019-01-05 PROCEDURE — A9270 NON-COVERED ITEM OR SERVICE: HCPCS | Performed by: PSYCHIATRY & NEUROLOGY

## 2019-01-05 PROCEDURE — 85025 COMPLETE CBC W/AUTO DIFF WBC: CPT

## 2019-01-05 PROCEDURE — 700101 HCHG RX REV CODE 250: Performed by: HOSPITALIST

## 2019-01-05 PROCEDURE — 80048 BASIC METABOLIC PNL TOTAL CA: CPT

## 2019-01-05 PROCEDURE — 700102 HCHG RX REV CODE 250 W/ 637 OVERRIDE(OP): Performed by: PSYCHIATRY & NEUROLOGY

## 2019-01-05 PROCEDURE — 770006 HCHG ROOM/CARE - MED/SURG/GYN SEMI*

## 2019-01-05 PROCEDURE — 700102 HCHG RX REV CODE 250 W/ 637 OVERRIDE(OP): Performed by: HOSPITALIST

## 2019-01-05 RX ADMIN — ARIPIPRAZOLE 5 MG: 10 TABLET ORAL at 05:02

## 2019-01-05 RX ADMIN — LACOSAMIDE 100 MG: 50 TABLET, FILM COATED ORAL at 18:02

## 2019-01-05 RX ADMIN — HEPARIN SODIUM 5000 UNITS: 5000 INJECTION, SOLUTION INTRAVENOUS; SUBCUTANEOUS at 20:55

## 2019-01-05 RX ADMIN — HEPARIN SODIUM 5000 UNITS: 5000 INJECTION, SOLUTION INTRAVENOUS; SUBCUTANEOUS at 13:32

## 2019-01-05 RX ADMIN — LIDOCAINE 1 PATCH: 50 PATCH TOPICAL at 05:03

## 2019-01-05 RX ADMIN — Medication 325 MG: at 05:03

## 2019-01-05 RX ADMIN — HEPARIN SODIUM 5000 UNITS: 5000 INJECTION, SOLUTION INTRAVENOUS; SUBCUTANEOUS at 05:03

## 2019-01-05 RX ADMIN — SEVELAMER CARBONATE 2400 MG: 800 TABLET, FILM COATED ORAL at 18:49

## 2019-01-05 RX ADMIN — OMEPRAZOLE 20 MG: 20 CAPSULE, DELAYED RELEASE ORAL at 05:02

## 2019-01-05 RX ADMIN — CLONIDINE HYDROCHLORIDE 0.1 MG: 0.1 TABLET ORAL at 08:51

## 2019-01-05 RX ADMIN — HYDROXYCHLOROQUINE SULFATE 200 MG: 200 TABLET, FILM COATED ORAL at 20:55

## 2019-01-05 RX ADMIN — OXYCODONE HYDROCHLORIDE 20 MG: 10 TABLET ORAL at 05:02

## 2019-01-05 RX ADMIN — SEVELAMER CARBONATE 2400 MG: 800 TABLET, FILM COATED ORAL at 14:18

## 2019-01-05 RX ADMIN — LACOSAMIDE 100 MG: 50 TABLET, FILM COATED ORAL at 05:02

## 2019-01-05 RX ADMIN — TRAZODONE HYDROCHLORIDE 50 MG: 50 TABLET ORAL at 20:55

## 2019-01-05 RX ADMIN — OXYCODONE HYDROCHLORIDE AND ACETAMINOPHEN 500 MG: 500 TABLET ORAL at 05:03

## 2019-01-05 ASSESSMENT — ENCOUNTER SYMPTOMS
TREMORS: 0
DIARRHEA: 0
ORTHOPNEA: 0
SENSORY CHANGE: 0
BACK PAIN: 1
TINGLING: 0
SHORTNESS OF BREATH: 0
BLURRED VISION: 0
WHEEZING: 0
CHILLS: 0
DIZZINESS: 0
FOCAL WEAKNESS: 0
VOMITING: 0
MYALGIAS: 1
PALPITATIONS: 0
DOUBLE VISION: 0
HEARTBURN: 0
DEPRESSION: 1
FEVER: 0
SPEECH CHANGE: 0
WEAKNESS: 0
SPUTUM PRODUCTION: 0
HEADACHES: 0
COUGH: 0
NAUSEA: 0
ABDOMINAL PAIN: 0

## 2019-01-05 ASSESSMENT — PAIN SCALES - GENERAL
PAINLEVEL_OUTOF10: 3
PAINLEVEL_OUTOF10: 6
PAINLEVEL_OUTOF10: 2
PAINLEVEL_OUTOF10: 2

## 2019-01-05 NOTE — CARE PLAN
Problem: Pain Management  Goal: Pain level will decrease to patient's comfort goal  Outcome: PROGRESSING AS EXPECTED  Will encourage pt to ambulate, Will medicate per MAR, and will provide non-pharmacologic pain relief measures.     Problem: Psychosocial Needs:  Goal: Level of anxiety will decrease  Outcome: PROGRESSING AS EXPECTED  Educate patient on POC, pt encourages to ask questions, and verbalize understanding

## 2019-01-05 NOTE — PROGRESS NOTES
Report received. POC discussed. Assumed care of pt.  Call light in reach. Hourly rounding in progress.  AxOx4. Calls appropriately. Pt has flat affect and is withdrawn when conversing. Pt encouraged to open blinds and walk in halls today.   Pt gets up SBA Pt diet Renal, low intake noted LBM 1/2  AV fistula to LUE. Bruit and thrill noted.  Port to L. chest running TKO.   Pt O2 sat 96 on RA   Pain 3/10, declines intervention  -void, anuric. Dialysis MWF +flatus  Pt has received Clonidine PRN. See MAR for detail.  All needs met at this time.     Pt hypertensive throughout night and this AM. Pt given PRNs for BP of 162/91.

## 2019-01-05 NOTE — PROGRESS NOTES
Hospital Medicine Daily Progress Note    Date of Service  1/5/2019    Chief Complaint  38 y.o. Female with h/o seizure disorder, ESRD on HD admitted 1/1/2019 with weakness and low back pain.    Hospital Course    Patient missed HD secondary to severe back pain.  MRI L-spine negative.  Suspect muscle strain.  Continued on MWF HD.  Severe depression and medical non-compliance.  Anticipate group home at d/c.        Interval Problem Update  1/5:  Very flat affect.  Intermittent word finding difficulty.  Reports improved back pain.  Volume status stable.    Consultants/Specialty  Nephrologist  Neurologist s/o  Psychiatrist    Code Status  Full    Disposition  Anticipate group home at d/c.  SW following.    Review of Systems  Review of Systems   Constitutional: Negative for chills and fever.   HENT: Negative for congestion.    Eyes: Negative for blurred vision and double vision.   Respiratory: Negative for cough and sputum production.    Cardiovascular: Negative for chest pain, palpitations and leg swelling.   Gastrointestinal: Negative for abdominal pain, diarrhea, heartburn, nausea and vomiting.   Genitourinary:        HD   Musculoskeletal: Positive for back pain and myalgias.   Skin: Negative for itching and rash.   Neurological: Negative for dizziness, tingling, tremors, sensory change, speech change, focal weakness, weakness and headaches.   Psychiatric/Behavioral: Positive for depression.        Physical Exam  Temp:  [36.4 °C (97.6 °F)-36.8 °C (98.2 °F)] 36.8 °C (98.2 °F)  Pulse:  [67-94] 86  Resp:  [15-17] 16  BP: (129-166)/() 138/93    Physical Exam   Constitutional: She is oriented to person, place, and time. She appears well-developed. No distress.   HENT:   Head: Normocephalic and atraumatic.   Right Ear: External ear normal.   Left Ear: External ear normal.   Eyes: Pupils are equal, round, and reactive to light. Conjunctivae are normal. No scleral icterus.   Neck: Neck supple. No JVD present.    Cardiovascular: Normal rate, regular rhythm and intact distal pulses.    No murmur heard.  Pulmonary/Chest: Effort normal and breath sounds normal. No stridor. No respiratory distress. She has no wheezes. She has no rales.   Abdominal: Soft. Bowel sounds are normal. She exhibits no distension. There is no tenderness. There is no rebound.   Musculoskeletal: Normal range of motion. She exhibits no edema or deformity.   Neurological: She is alert and oriented to person, place, and time.   Skin: Skin is warm and dry. No rash noted. She is not diaphoretic. No erythema.   Psychiatric: Her speech is delayed. She is slowed and withdrawn.   Nursing note and vitals reviewed.      Fluids    Intake/Output Summary (Last 24 hours) at 01/05/19 1445  Last data filed at 01/05/19 0800   Gross per 24 hour   Intake              220 ml   Output                0 ml   Net              220 ml       Laboratory  Recent Labs      01/03/19   0530  01/04/19   0400  01/05/19   0500   WBC  3.3*  3.9*  5.2   RBC  3.02*  2.93*  3.40*   HEMOGLOBIN  9.0*  8.8*  10.0*   HEMATOCRIT  28.6*  27.7*  31.5*   MCV  94.7  94.5  92.6   MCH  29.8  30.0  29.4   MCHC  31.5*  31.8*  31.7*   RDW  45.1  44.2  42.1   PLATELETCT  165  137*  161*   MPV  10.8  10.8  11.0     Recent Labs      01/03/19   0530  01/03/19   1015  01/05/19   0500   SODIUM  141  142  139   POTASSIUM  3.7  3.9  4.1   CHLORIDE  102  102  102   CO2  27  28  27   GLUCOSE  91  85  91   BUN  28*  30*  21   CREATININE  6.27*  7.14*  6.12*   CALCIUM  8.8  9.1  10.0                   Imaging   MR-LUMBAR SPINE-W/O   Final Result      1.  Diffusely decreased signal again seen throughout the marrow space consistent with red marrow conversion, likely secondary to chronic anemia.   2.  No significant disc bulging, central canal narrowing, or foraminal narrowing.   3.  No lumbar spine fracture or subluxation.           Assessment/Plan  Medical non-compliance- (present on admission)   Assessment & Plan     Patient at this point has been counseled on compliance she says she does not want to be noncompliant on purpose.  The patient's non-compliance seems to be secondary to her major depression.   Psychiatry following.  Likely will need group home at d/c.     ESRD (end stage renal disease) on dialysis ([facility])- (present on admission)   Assessment & Plan    Missed HD secondary to back pain.  Resumed MWF HD while in the hospital.  Volume status stable.  Nephrology following.     Failure to thrive in adult   Assessment & Plan    PT OT evaluation done.  Would benefit from group home.  SW following.     Hypertension- (present on admission)   Assessment & Plan    Continue with blood pressure management optimization keep systolic blood pressure under 140 diastolic under 90     Lupus (systemic lupus erythematosus) ([facility])- (present on admission)   Assessment & Plan    Chronic  At baseline  Continue Plaquenil.  Continue to be managed as outpatient.     Chronic lupus nephritis ([facility])- (present on admission)   Assessment & Plan    Continue with monitoring at this point for further deterioration and have PT OT evaluation done     DAVI (obstructive sleep apnea)- (present on admission)   Assessment & Plan    Patient will benefit from nighttime oxygen support.     Seizure disorder ([facility])- (present on admission)   Assessment & Plan    No witnessed seizure activity on admission.  Continue vimpat  D/C [facility]  Neurology s/o  Follow as outpatient.     Anemia in chronic kidney disease, on chronic dialysis ([facility])   Assessment & Plan    No evidence of active bleed.  Continue oral iron replacement.        Chronic pain disorder- (present on admission)   Assessment & Plan    Continue with pain management optimization.       Obesity (BMI 30-39.9)- (present on admission)   Assessment & Plan    Weight loss education provided.     Depression- (present on admission)   Assessment & Plan    Very flat affect.  Intermittent word finding.  Continue  abilify  Psychiatry following.  Will need continued outpatient psychiatry.       Low back pain- (present on admission)   Assessment & Plan    MRI does not indicates severe spinal stenosis.    Likely muscle strain.  Pain improving.  Able to mobilize.     Thrombocytopenia (CMS-HCC)- (present on admission)   Assessment & Plan    Chronic, stable, patient currently is not bleeding.    Secondary to SLE.     Anxiety- (present on admission)   Assessment & Plan    Chronic.  Stable at this point.     Fibromyalgia- (present on admission)   Assessment & Plan    Continue with pain management optimization.          VTE prophylaxis: Heparin

## 2019-01-05 NOTE — ASSESSMENT & PLAN NOTE
No witnessed seizure activity on admission.  Continue vimpat  D/C [facility]  Neurology s/o  Follow as outpatient.

## 2019-01-05 NOTE — CARE PLAN
Problem: Pain Management  Goal: Pain level will decrease to patient's comfort goal  Outcome: PROGRESSING AS EXPECTED  Pt educated on the 1-10 pain scale. Pt verbalizes understanding. Pt states that current drug regimen is effectively controlling pain. Oxy 20 PRN    Problem: Psychosocial Needs:  Goal: Level of anxiety will decrease  Outcome: PROGRESSING SLOWER THAN EXPECTED  Psych following. Pt has low motivation and little interest in care. Pt educated on interventions and POC. Pt encouraged to open blinds and ambulate in halls. Will continue to encourage

## 2019-01-05 NOTE — PROGRESS NOTES
Hemodialysis treatment ordered today per Dr. Shaver x 3 hours. Treatment initiated at 1059, ended at 1359.     Patient tolerated tx; see paper flow sheet for details.     Net UF 2,000 mL.     Needles removed from access site. Dressings applied and sites held x 10 minutes; verified no bleeding. Positive bruit/thrill post tx. Staff RN to monitor AVG for breakthrough bleeding. Should breakthrough bleeding occur, staff RN to apply pressure to access sites until bleeding resolved. Notify Dialysis and Nephrologist for follow-up.    Report given to Primary RN.

## 2019-01-05 NOTE — PROGRESS NOTES
Nephrology Daily Progress Note    Date of Service  1/5/2019    Chief Complaint  38 y.o. female admitted 1/1/2019 with ESRD, missed HD    Interval Problem Update  HD on a MWF schedule  Events noted  Elevated BP last night    Review of Systems  Review of Systems   Constitutional: Positive for malaise/fatigue. Negative for chills and fever.   HENT: Negative.    Respiratory: Negative for cough, shortness of breath and wheezing.    Cardiovascular: Negative for chest pain, palpitations and orthopnea.   Gastrointestinal: Negative for abdominal pain, nausea and vomiting.   Genitourinary: Negative for dysuria.   Musculoskeletal: Positive for back pain.   All other systems reviewed and are negative.       Physical Exam  Temp:  [36.2 °C (97.1 °F)-36.8 °C (98.2 °F)] 36.8 °C (98.2 °F)  Pulse:  [67-94] 86  Resp:  [15-18] 16  BP: (129-166)/() 138/93    Physical Exam   Constitutional: She is oriented to person, place, and time. No distress.   HENT:   Head: Normocephalic and atraumatic.   Eyes: Pupils are equal, round, and reactive to light. Conjunctivae and EOM are normal.   Neck: Normal range of motion. Neck supple.   Cardiovascular: Normal rate and regular rhythm.  Exam reveals no gallop and no friction rub.    Pulmonary/Chest: Effort normal and breath sounds normal. No respiratory distress. She has no wheezes. She has no rales.   Abdominal: Soft. Bowel sounds are normal. She exhibits no distension. There is no tenderness.   Neurological: She is alert and oriented to person, place, and time. No cranial nerve deficit.   Skin: Skin is warm and dry. No rash noted. She is not diaphoretic. No erythema.       Fluids    Intake/Output Summary (Last 24 hours) at 01/05/19 1145  Last data filed at 01/05/19 0800   Gross per 24 hour   Intake              840 ml   Output             2500 ml   Net            -1660 ml       Laboratory  Recent Labs      01/03/19   0530  01/04/19   0400  01/05/19   0500   WBC  3.3*  3.9*  5.2   RBC  3.02*   2.93*  3.40*   HEMOGLOBIN  9.0*  8.8*  10.0*   HEMATOCRIT  28.6*  27.7*  31.5*   MCV  94.7  94.5  92.6   MCH  29.8  30.0  29.4   MCHC  31.5*  31.8*  31.7*   RDW  45.1  44.2  42.1   PLATELETCT  165  137*  161*   MPV  10.8  10.8  11.0     Recent Labs      01/03/19   0530  01/03/19   1015  01/05/19   0500   SODIUM  141  142  139   POTASSIUM  3.7  3.9  4.1   CHLORIDE  102  102  102   CO2  27  28  27   GLUCOSE  91  85  91   BUN  28*  30*  21   CREATININE  6.27*  7.14*  6.12*   CALCIUM  8.8  9.1  10.0                   Imaging  MR-LUMBAR SPINE-W/O   Final Result      1.  Diffusely decreased signal again seen throughout the marrow space consistent with red marrow conversion, likely secondary to chronic anemia.   2.  No significant disc bulging, central canal narrowing, or foraminal narrowing.   3.  No lumbar spine fracture or subluxation.           Assessment/Plan  1. ESRD - HD on MWF schedule -please see dialysis flow sheet for details  2. Back pain - MRI negative  3. Anemia - on epogen  4. HTN: if BP remains elevated -would add amlodipine  Recs:   -HD on a MWF schedule  -renal diet

## 2019-01-05 NOTE — PROGRESS NOTES
Bedside report received.  Assessment complete.  A&O x 4. Patient calls appropriately.  Patient up self  Patient has 7/10 pain, will medicate with night meds based off BP  Denies N&V. Tolerating renal diet, however, patient states she does not have much of an appetite.  + void, periodically, dialysis pt., + flatus, + BM.  Patient denies SOB.  Patient pleasant with staff and in bed resting.  Review plan with of care with patient. Call light and personal belongings with in reach. Hourly rounding in place. All needs met at this time.

## 2019-01-05 NOTE — PROGRESS NOTES
"Patient reports \"don't feel good\" She is diaphoretic.  She is declining interventions at this time but is using an ice pack.    "

## 2019-01-06 LAB
ANION GAP SERPL CALC-SCNC: 14 MMOL/L (ref 0–11.9)
BASOPHILS # BLD AUTO: 1.2 % (ref 0–1.8)
BASOPHILS # BLD: 0.08 K/UL (ref 0–0.12)
BUN SERPL-MCNC: 33 MG/DL (ref 8–22)
CALCIUM SERPL-MCNC: 9.9 MG/DL (ref 8.5–10.5)
CHLORIDE SERPL-SCNC: 100 MMOL/L (ref 96–112)
CO2 SERPL-SCNC: 24 MMOL/L (ref 20–33)
CREAT SERPL-MCNC: 7.82 MG/DL (ref 0.5–1.4)
EOSINOPHIL # BLD AUTO: 0.2 K/UL (ref 0–0.51)
EOSINOPHIL NFR BLD: 2.9 % (ref 0–6.9)
ERYTHROCYTE [DISTWIDTH] IN BLOOD BY AUTOMATED COUNT: 41.7 FL (ref 35.9–50)
GLUCOSE SERPL-MCNC: 98 MG/DL (ref 65–99)
HCT VFR BLD AUTO: 32.6 % (ref 37–47)
HGB BLD-MCNC: 10.7 G/DL (ref 12–16)
IMM GRANULOCYTES # BLD AUTO: 0.08 K/UL (ref 0–0.11)
IMM GRANULOCYTES NFR BLD AUTO: 1.2 % (ref 0–0.9)
LYMPHOCYTES # BLD AUTO: 2.09 K/UL (ref 1–4.8)
LYMPHOCYTES NFR BLD: 30.4 % (ref 22–41)
MCH RBC QN AUTO: 30 PG (ref 27–33)
MCHC RBC AUTO-ENTMCNC: 32.8 G/DL (ref 33.6–35)
MCV RBC AUTO: 91.3 FL (ref 81.4–97.8)
MONOCYTES # BLD AUTO: 0.54 K/UL (ref 0–0.85)
MONOCYTES NFR BLD AUTO: 7.8 % (ref 0–13.4)
NEUTROPHILS # BLD AUTO: 3.89 K/UL (ref 2–7.15)
NEUTROPHILS NFR BLD: 56.5 % (ref 44–72)
NRBC # BLD AUTO: 0 K/UL
NRBC BLD-RTO: 0 /100 WBC
PLATELET # BLD AUTO: 172 K/UL (ref 164–446)
PMV BLD AUTO: 11.1 FL (ref 9–12.9)
POTASSIUM SERPL-SCNC: 4.2 MMOL/L (ref 3.6–5.5)
RBC # BLD AUTO: 3.57 M/UL (ref 4.2–5.4)
SODIUM SERPL-SCNC: 138 MMOL/L (ref 135–145)
WBC # BLD AUTO: 6.9 K/UL (ref 4.8–10.8)

## 2019-01-06 PROCEDURE — 99232 SBSQ HOSP IP/OBS MODERATE 35: CPT | Performed by: INTERNAL MEDICINE

## 2019-01-06 PROCEDURE — 700105 HCHG RX REV CODE 258

## 2019-01-06 PROCEDURE — 80048 BASIC METABOLIC PNL TOTAL CA: CPT

## 2019-01-06 PROCEDURE — 700102 HCHG RX REV CODE 250 W/ 637 OVERRIDE(OP): Performed by: PSYCHIATRY & NEUROLOGY

## 2019-01-06 PROCEDURE — 700102 HCHG RX REV CODE 250 W/ 637 OVERRIDE(OP): Performed by: HOSPITALIST

## 2019-01-06 PROCEDURE — A9270 NON-COVERED ITEM OR SERVICE: HCPCS | Performed by: HOSPITALIST

## 2019-01-06 PROCEDURE — 700101 HCHG RX REV CODE 250: Performed by: HOSPITALIST

## 2019-01-06 PROCEDURE — 85025 COMPLETE CBC W/AUTO DIFF WBC: CPT

## 2019-01-06 PROCEDURE — 770006 HCHG ROOM/CARE - MED/SURG/GYN SEMI*

## 2019-01-06 PROCEDURE — A9270 NON-COVERED ITEM OR SERVICE: HCPCS | Performed by: PSYCHIATRY & NEUROLOGY

## 2019-01-06 PROCEDURE — 700111 HCHG RX REV CODE 636 W/ 250 OVERRIDE (IP): Performed by: HOSPITALIST

## 2019-01-06 RX ORDER — AMLODIPINE BESYLATE 10 MG/1
5 TABLET ORAL
Status: DISCONTINUED | OUTPATIENT
Start: 2019-01-06 | End: 2019-01-06

## 2019-01-06 RX ORDER — SODIUM CHLORIDE 9 MG/ML
INJECTION, SOLUTION INTRAVENOUS
Status: COMPLETED
Start: 2019-01-06 | End: 2019-01-06

## 2019-01-06 RX ORDER — AMLODIPINE BESYLATE 10 MG/1
10 TABLET ORAL
Status: DISCONTINUED | OUTPATIENT
Start: 2019-01-07 | End: 2019-01-09 | Stop reason: HOSPADM

## 2019-01-06 RX ADMIN — HYDROXYCHLOROQUINE SULFATE 200 MG: 200 TABLET, FILM COATED ORAL at 20:57

## 2019-01-06 RX ADMIN — SODIUM CHLORIDE: 9 INJECTION, SOLUTION INTRAVENOUS at 21:15

## 2019-01-06 RX ADMIN — SEVELAMER CARBONATE 2400 MG: 800 TABLET, FILM COATED ORAL at 17:48

## 2019-01-06 RX ADMIN — HEPARIN SODIUM 5000 UNITS: 5000 INJECTION, SOLUTION INTRAVENOUS; SUBCUTANEOUS at 20:57

## 2019-01-06 RX ADMIN — CLONIDINE HYDROCHLORIDE 0.1 MG: 0.1 TABLET ORAL at 18:48

## 2019-01-06 RX ADMIN — SEVELAMER CARBONATE 2400 MG: 800 TABLET, FILM COATED ORAL at 09:00

## 2019-01-06 RX ADMIN — OXYCODONE HYDROCHLORIDE AND ACETAMINOPHEN 500 MG: 500 TABLET ORAL at 05:17

## 2019-01-06 RX ADMIN — CLONIDINE HYDROCHLORIDE 0.1 MG: 0.1 TABLET ORAL at 17:48

## 2019-01-06 RX ADMIN — ARIPIPRAZOLE 5 MG: 10 TABLET ORAL at 05:17

## 2019-01-06 RX ADMIN — LACOSAMIDE 100 MG: 50 TABLET, FILM COATED ORAL at 17:48

## 2019-01-06 RX ADMIN — ENALAPRILAT 1.25 MG: 1.25 INJECTION INTRAVENOUS at 16:28

## 2019-01-06 RX ADMIN — LIDOCAINE 1 PATCH: 50 PATCH TOPICAL at 05:20

## 2019-01-06 RX ADMIN — OXYCODONE HYDROCHLORIDE 20 MG: 10 TABLET ORAL at 04:25

## 2019-01-06 RX ADMIN — SEVELAMER CARBONATE 2400 MG: 800 TABLET, FILM COATED ORAL at 13:34

## 2019-01-06 RX ADMIN — Medication 325 MG: at 05:17

## 2019-01-06 RX ADMIN — STANDARDIZED SENNA CONCENTRATE AND DOCUSATE SODIUM 2 TABLET: 8.6; 5 TABLET, FILM COATED ORAL at 05:17

## 2019-01-06 RX ADMIN — HEPARIN SODIUM 5000 UNITS: 5000 INJECTION, SOLUTION INTRAVENOUS; SUBCUTANEOUS at 05:17

## 2019-01-06 RX ADMIN — CLONIDINE HYDROCHLORIDE 0.1 MG: 0.1 TABLET ORAL at 10:28

## 2019-01-06 RX ADMIN — HEPARIN SODIUM 5000 UNITS: 5000 INJECTION, SOLUTION INTRAVENOUS; SUBCUTANEOUS at 13:34

## 2019-01-06 RX ADMIN — CLONIDINE HYDROCHLORIDE 0.1 MG: 0.1 TABLET ORAL at 04:13

## 2019-01-06 RX ADMIN — LACOSAMIDE 100 MG: 50 TABLET, FILM COATED ORAL at 05:17

## 2019-01-06 RX ADMIN — OMEPRAZOLE 20 MG: 20 CAPSULE, DELAYED RELEASE ORAL at 05:17

## 2019-01-06 RX ADMIN — ENALAPRILAT 1.25 MG: 1.25 INJECTION INTRAVENOUS at 09:00

## 2019-01-06 RX ADMIN — OXYCODONE HYDROCHLORIDE 10 MG: 10 TABLET ORAL at 11:44

## 2019-01-06 ASSESSMENT — ENCOUNTER SYMPTOMS
CONSTIPATION: 0
SENSORY CHANGE: 0
NERVOUS/ANXIOUS: 1
SORE THROAT: 0
DIARRHEA: 0
DIZZINESS: 0
ORTHOPNEA: 0
CHILLS: 0
FEVER: 0
TREMORS: 0
SHORTNESS OF BREATH: 0
ABDOMINAL PAIN: 0
SPEECH CHANGE: 0
MYALGIAS: 1
NAUSEA: 0
PALPITATIONS: 0
WEAKNESS: 0
BLURRED VISION: 0
VOMITING: 0
DEPRESSION: 1
BACK PAIN: 1
WHEEZING: 0
COUGH: 0
FALLS: 0
TINGLING: 0
HEARTBURN: 0
HEADACHES: 0

## 2019-01-06 ASSESSMENT — PAIN SCALES - GENERAL
PAINLEVEL_OUTOF10: 2
PAINLEVEL_OUTOF10: 4
PAINLEVEL_OUTOF10: 7
PAINLEVEL_OUTOF10: 4
PAINLEVEL_OUTOF10: 3

## 2019-01-06 NOTE — PROGRESS NOTES
Nephrology Daily Progress Note    Date of Service  1/6/2019    Chief Complaint  38 y.o. female admitted 1/1/2019 with ESRD, missed HD    Interval Problem Update  HD on a MWF schedule  Events noted  Elevated BP  -added amlidipine    Review of Systems  Review of Systems   Constitutional: Positive for malaise/fatigue. Negative for chills and fever.   HENT: Negative.    Respiratory: Negative for cough, shortness of breath and wheezing.    Cardiovascular: Negative for chest pain, palpitations and orthopnea.   Gastrointestinal: Negative for abdominal pain, nausea and vomiting.   Genitourinary: Negative for dysuria.   Musculoskeletal: Positive for back pain.   All other systems reviewed and are negative.       Physical Exam  Temp:  [36.3 °C (97.4 °F)-36.9 °C (98.4 °F)] 36.7 °C (98 °F)  Pulse:  [] 96  Resp:  [16-17] 17  BP: (117-169)/() 162/105    Physical Exam   Constitutional: She is oriented to person, place, and time. No distress.   HENT:   Head: Normocephalic and atraumatic.   Eyes: Pupils are equal, round, and reactive to light. Conjunctivae and EOM are normal.   Neck: Normal range of motion. Neck supple.   Cardiovascular: Normal rate and regular rhythm.  Exam reveals no gallop and no friction rub.    Pulmonary/Chest: Effort normal and breath sounds normal. No respiratory distress. She has no wheezes. She has no rales.   Abdominal: Soft. Bowel sounds are normal. She exhibits no distension. There is no tenderness.   Neurological: She is alert and oriented to person, place, and time. No cranial nerve deficit.   Skin: Skin is warm and dry. No rash noted. She is not diaphoretic. No erythema.       Fluids    Intake/Output Summary (Last 24 hours) at 01/06/19 1115  Last data filed at 01/06/19 0800   Gross per 24 hour   Intake              660 ml   Output                0 ml   Net              660 ml       Laboratory  Recent Labs      01/04/19   0400  01/05/19   0500  01/06/19   0420   WBC  3.9*  5.2  6.9   RBC   2.93*  3.40*  3.57*   HEMOGLOBIN  8.8*  10.0*  10.7*   HEMATOCRIT  27.7*  31.5*  32.6*   MCV  94.5  92.6  91.3   MCH  30.0  29.4  30.0   MCHC  31.8*  31.7*  32.8*   RDW  44.2  42.1  41.7   PLATELETCT  137*  161*  172   MPV  10.8  11.0  11.1     Recent Labs      01/05/19   0500  01/06/19   0420   SODIUM  139  138   POTASSIUM  4.1  4.2   CHLORIDE  102  100   CO2  27  24   GLUCOSE  91  98   BUN  21  33*   CREATININE  6.12*  7.82*   CALCIUM  10.0  9.9                   Imaging  MR-LUMBAR SPINE-W/O   Final Result      1.  Diffusely decreased signal again seen throughout the marrow space consistent with red marrow conversion, likely secondary to chronic anemia.   2.  No significant disc bulging, central canal narrowing, or foraminal narrowing.   3.  No lumbar spine fracture or subluxation.           Assessment/Plan  1. ESRD/HD -MWF  2. Back pain - MRI negative  3. Anemia - on epogen  4. HTN: BP remains elevated -added amlodipine  Recs:   -HD on a MWF schedule  -renal diet

## 2019-01-06 NOTE — PROGRESS NOTES
Hospital Medicine Daily Progress Note    Date of Service  1/6/2019    Chief Complaint  38 y.o. Female with h/o seizure disorder, ESRD on HD admitted 1/1/2019 with weakness and low back pain.    Hospital Course    Patient missed HD secondary to severe back pain.  MRI L-spine negative.  Suspect muscle strain.  Continued on MWF HD.  Severe depression and medical non-compliance.  Anticipate group home at d/c.        Interval Problem Update  1/5:  Very flat affect.  Intermittent word finding difficulty.  Reports improved back pain.  Volume status stable.  1/6:  Thought process appears to be improved today.  Requesting to shower.  States she wants to go to a group home at d/c.      Consultants/Specialty  Nephrologist  Neurologist s/o  Psychiatrist    Code Status  Full    Disposition  Anticipate group home at d/c.  SW following.    Review of Systems  Review of Systems   Constitutional: Negative for chills, fever and malaise/fatigue.   HENT: Negative for congestion and sore throat.    Eyes: Negative for blurred vision.   Respiratory: Negative for cough and shortness of breath.    Cardiovascular: Negative for chest pain and leg swelling.   Gastrointestinal: Negative for abdominal pain, constipation, diarrhea, heartburn, nausea and vomiting.   Genitourinary:        HD   Musculoskeletal: Positive for back pain and myalgias. Negative for falls.   Skin: Negative for rash.   Neurological: Negative for dizziness, tingling, tremors, sensory change, speech change, weakness and headaches.   Psychiatric/Behavioral: Positive for depression. The patient is nervous/anxious.         Physical Exam  Temp:  [36.3 °C (97.4 °F)-36.9 °C (98.4 °F)] 36.7 °C (98 °F)  Pulse:  [] 98  Resp:  [16-17] 17  BP: (117-169)/() 157/99    Physical Exam   Constitutional: She is oriented to person, place, and time. She appears well-developed and well-nourished.   HENT:   Head: Normocephalic and atraumatic.   Mouth/Throat: No oropharyngeal exudate.    Eyes: Conjunctivae are normal. No scleral icterus.   Neck: Normal range of motion. No JVD present.   Cardiovascular: Normal rate, regular rhythm and intact distal pulses.    No murmur heard.  Pulmonary/Chest: Effort normal and breath sounds normal. No stridor. No respiratory distress. She has no wheezes. She has no rales.   Abdominal: Soft. Bowel sounds are normal. She exhibits no distension. There is no tenderness. There is no rebound.   Musculoskeletal: Normal range of motion. She exhibits no edema.   Neurological: She is alert and oriented to person, place, and time. No cranial nerve deficit.   Skin: Skin is warm and dry. She is not diaphoretic. No erythema.   Psychiatric: Her speech is delayed. She is slowed and withdrawn.   Nursing note and vitals reviewed.      Fluids    Intake/Output Summary (Last 24 hours) at 01/06/19 1237  Last data filed at 01/06/19 0800   Gross per 24 hour   Intake              380 ml   Output                0 ml   Net              380 ml       Laboratory  Recent Labs      01/04/19   0400  01/05/19   0500  01/06/19   0420   WBC  3.9*  5.2  6.9   RBC  2.93*  3.40*  3.57*   HEMOGLOBIN  8.8*  10.0*  10.7*   HEMATOCRIT  27.7*  31.5*  32.6*   MCV  94.5  92.6  91.3   MCH  30.0  29.4  30.0   MCHC  31.8*  31.7*  32.8*   RDW  44.2  42.1  41.7   PLATELETCT  137*  161*  172   MPV  10.8  11.0  11.1     Recent Labs      01/05/19   0500  01/06/19   0420   SODIUM  139  138   POTASSIUM  4.1  4.2   CHLORIDE  102  100   CO2  27  24   GLUCOSE  91  98   BUN  21  33*   CREATININE  6.12*  7.82*   CALCIUM  10.0  9.9                   Imaging   MR-LUMBAR SPINE-W/O   Final Result      1.  Diffusely decreased signal again seen throughout the marrow space consistent with red marrow conversion, likely secondary to chronic anemia.   2.  No significant disc bulging, central canal narrowing, or foraminal narrowing.   3.  No lumbar spine fracture or subluxation.           Assessment/Plan  Medical non-compliance-  (present on admission)   Assessment & Plan    Patient at this point has been counseled on compliance she says she does not want to be noncompliant on purpose.  The patient's non-compliance seems to be secondary to her major depression.  Mood slowly improving.  Psychiatry following.  Likely will need group home at d/c.     ESRD (end stage renal disease) on dialysis ([facility])- (present on admission)   Assessment & Plan    Missed HD secondary to back pain.  Resumed MWF HD while in the hospital.  Volume status stable.  Nephrology following.     Failure to thrive in adult   Assessment & Plan    PT OT evaluation done.  Would benefit from group home.  SW following.     Hypertension- (present on admission)   Assessment & Plan    Continue with blood pressure management optimization keep systolic blood pressure under 140 diastolic under 90     Lupus (systemic lupus erythematosus) ([facility])- (present on admission)   Assessment & Plan    Chronic  At baseline  Continue Plaquenil.  Continue to be managed as outpatient.     Chronic lupus nephritis ([facility])- (present on admission)   Assessment & Plan    Continue with monitoring at this point for further deterioration and have PT OT evaluation done     DAVI (obstructive sleep apnea)- (present on admission)   Assessment & Plan    Patient will benefit from nighttime oxygen support.     Seizure disorder ([facility])- (present on admission)   Assessment & Plan    No witnessed seizure activity on admission.  Continue vimpat  D/C [facility]  Neurology s/o  Follow as outpatient.     Anemia in chronic kidney disease, on chronic dialysis ([facility])   Assessment & Plan    No evidence of active bleed.  Continue oral iron replacement.        Chronic pain disorder- (present on admission)   Assessment & Plan    Continue with pain management optimization.       Obesity (BMI 30-39.9)- (present on admission)   Assessment & Plan    Weight loss education provided.     Depression- (present on admission)   Assessment & Plan     Very flat affect.  Intermittent word finding.  Continue abilify  Psychiatry following.  Will need continued outpatient psychiatry.       Low back pain- (present on admission)   Assessment & Plan    MRI does not indicates severe spinal stenosis.    Likely muscle strain.  Pain improving.  Able to mobilize.     Thrombocytopenia (CMS-HCC)- (present on admission)   Assessment & Plan    Chronic, stable, patient currently is not bleeding.    Secondary to SLE.     Anxiety- (present on admission)   Assessment & Plan    Chronic.  Stable at this point.     Fibromyalgia- (present on admission)   Assessment & Plan    Continue with pain management optimization.          VTE prophylaxis: Heparin

## 2019-01-06 NOTE — PROGRESS NOTES
Report received. POC discussed. Assumed care of pt.  Call light in reach. Hourly rounding in progress.  AxOx4. Calls appropriately. Pt has flat affect. Pt demonstrating increased mood this AM. Pt smiling at times.   Pt gets up self Pt diet Renal, low intake noted LBM 1/5  AV fistula to LUE. Bruit and thrill noted.  Port to L. chest running TKO.   Pt O2 sat 96 on RA   Pain 2/10, declines intervention  Pt anuric. Dialysis MWF +flatus  Pt hypertensive this AM. Pt has received Vasotec PRN. See MAR for detail.  All needs met at this time.      Chem Panel drawn this AM. Lab contacted as results are still pending. Per tech, Lab to run chem panel within the next 30 minutes.

## 2019-01-06 NOTE — CARE PLAN
Problem: Pain Management  Goal: Pain level will decrease to patient's comfort goal  Outcome: PROGRESSING AS EXPECTED  Pt educated on the 1-10 pain scale. Pt verbalizes understanding. Pt states that current drug regimen is effectively controlling pain. Oxy 20 PRN. No complaints of pain this AM. Lidocaine patch to lower back.      Problem: Psychosocial Needs:  Goal: Level of anxiety will decrease  Outcome: PROGRESSING SLOWER THAN EXPECTED  Psych following. Pt expressing increased mood this AM. Pt smiling periodically. Pt educated on interventions and POC. Pt encouraged to open blinds and ambulate in halls. Will continue to encourage.

## 2019-01-06 NOTE — CARE PLAN
Problem: Pain Management  Goal: Pain level will decrease to patient's comfort goal  Outcome: PROGRESSING AS EXPECTED  Will encourage pt to ambulate, Will medicate per MAR, and will provide non-pharmacologic pain relief measures.     Problem: Fluid Volume:  Goal: Will maintain balanced intake and output  Outcome: PROGRESSING AS EXPECTED  Pt on MWF dialysis schedule

## 2019-01-06 NOTE — PROGRESS NOTES
Bedside report received.  Assessment complete.  A&O x 4. Patient calls appropriately.  Patient up self  Patient has 6/10 pain, pt denies interventions  Denies N&V. Tolerating renal diet, however, patient states she does not have much of an appetite.  + void, periodically, dialysis pt., + flatus, + BM.  Patient denies SOB.  Patient pleasant with staff and in bed resting.  Review plan with of care with patient. Call light and personal belongings with in reach. Hourly rounding in place. All needs met at this time.

## 2019-01-07 LAB
ANION GAP SERPL CALC-SCNC: 12 MMOL/L (ref 0–11.9)
BASOPHILS # BLD AUTO: 1 % (ref 0–1.8)
BASOPHILS # BLD: 0.07 K/UL (ref 0–0.12)
BUN SERPL-MCNC: 40 MG/DL (ref 8–22)
CALCIUM SERPL-MCNC: 9.7 MG/DL (ref 8.5–10.5)
CHLORIDE SERPL-SCNC: 100 MMOL/L (ref 96–112)
CO2 SERPL-SCNC: 25 MMOL/L (ref 20–33)
CREAT SERPL-MCNC: 10.14 MG/DL (ref 0.5–1.4)
EOSINOPHIL # BLD AUTO: 0.15 K/UL (ref 0–0.51)
EOSINOPHIL NFR BLD: 2.2 % (ref 0–6.9)
ERYTHROCYTE [DISTWIDTH] IN BLOOD BY AUTOMATED COUNT: 41.9 FL (ref 35.9–50)
GLUCOSE SERPL-MCNC: 91 MG/DL (ref 65–99)
HCT VFR BLD AUTO: 29 % (ref 37–47)
HGB BLD-MCNC: 9.3 G/DL (ref 12–16)
IMM GRANULOCYTES # BLD AUTO: 0.06 K/UL (ref 0–0.11)
IMM GRANULOCYTES NFR BLD AUTO: 0.9 % (ref 0–0.9)
LYMPHOCYTES # BLD AUTO: 1.63 K/UL (ref 1–4.8)
LYMPHOCYTES NFR BLD: 23.7 % (ref 22–41)
MCH RBC QN AUTO: 29.7 PG (ref 27–33)
MCHC RBC AUTO-ENTMCNC: 32.1 G/DL (ref 33.6–35)
MCV RBC AUTO: 92.7 FL (ref 81.4–97.8)
MONOCYTES # BLD AUTO: 0.41 K/UL (ref 0–0.85)
MONOCYTES NFR BLD AUTO: 6 % (ref 0–13.4)
NEUTROPHILS # BLD AUTO: 4.56 K/UL (ref 2–7.15)
NEUTROPHILS NFR BLD: 66.2 % (ref 44–72)
NRBC # BLD AUTO: 0 K/UL
NRBC BLD-RTO: 0 /100 WBC
PLATELET # BLD AUTO: 159 K/UL (ref 164–446)
PMV BLD AUTO: 11.2 FL (ref 9–12.9)
POTASSIUM SERPL-SCNC: 4.3 MMOL/L (ref 3.6–5.5)
RBC # BLD AUTO: 3.13 M/UL (ref 4.2–5.4)
SODIUM SERPL-SCNC: 137 MMOL/L (ref 135–145)
WBC # BLD AUTO: 6.9 K/UL (ref 4.8–10.8)

## 2019-01-07 PROCEDURE — 700101 HCHG RX REV CODE 250: Performed by: HOSPITALIST

## 2019-01-07 PROCEDURE — A9270 NON-COVERED ITEM OR SERVICE: HCPCS | Performed by: PSYCHIATRY & NEUROLOGY

## 2019-01-07 PROCEDURE — 700111 HCHG RX REV CODE 636 W/ 250 OVERRIDE (IP): Performed by: HOSPITALIST

## 2019-01-07 PROCEDURE — 97116 GAIT TRAINING THERAPY: CPT

## 2019-01-07 PROCEDURE — A9270 NON-COVERED ITEM OR SERVICE: HCPCS | Performed by: HOSPITALIST

## 2019-01-07 PROCEDURE — 85025 COMPLETE CBC W/AUTO DIFF WBC: CPT

## 2019-01-07 PROCEDURE — 700102 HCHG RX REV CODE 250 W/ 637 OVERRIDE(OP): Performed by: PSYCHIATRY & NEUROLOGY

## 2019-01-07 PROCEDURE — 770006 HCHG ROOM/CARE - MED/SURG/GYN SEMI*

## 2019-01-07 PROCEDURE — 80048 BASIC METABOLIC PNL TOTAL CA: CPT

## 2019-01-07 PROCEDURE — 700111 HCHG RX REV CODE 636 W/ 250 OVERRIDE (IP): Mod: JG

## 2019-01-07 PROCEDURE — 99232 SBSQ HOSP IP/OBS MODERATE 35: CPT | Performed by: INTERNAL MEDICINE

## 2019-01-07 PROCEDURE — 700102 HCHG RX REV CODE 250 W/ 637 OVERRIDE(OP): Performed by: HOSPITALIST

## 2019-01-07 PROCEDURE — 97530 THERAPEUTIC ACTIVITIES: CPT

## 2019-01-07 PROCEDURE — 90935 HEMODIALYSIS ONE EVALUATION: CPT

## 2019-01-07 PROCEDURE — 90935 HEMODIALYSIS ONE EVALUATION: CPT | Performed by: INTERNAL MEDICINE

## 2019-01-07 RX ADMIN — OXYCODONE HYDROCHLORIDE AND ACETAMINOPHEN 500 MG: 500 TABLET ORAL at 05:46

## 2019-01-07 RX ADMIN — HYDROXYCHLOROQUINE SULFATE 200 MG: 200 TABLET, FILM COATED ORAL at 21:45

## 2019-01-07 RX ADMIN — ERYTHROPOIETIN 10000 UNITS: 10000 INJECTION, SOLUTION INTRAVENOUS; SUBCUTANEOUS at 14:38

## 2019-01-07 RX ADMIN — Medication 325 MG: at 05:46

## 2019-01-07 RX ADMIN — ONDANSETRON 4 MG: 2 INJECTION INTRAMUSCULAR; INTRAVENOUS at 18:43

## 2019-01-07 RX ADMIN — LACOSAMIDE 100 MG: 50 TABLET, FILM COATED ORAL at 17:17

## 2019-01-07 RX ADMIN — OXYCODONE HYDROCHLORIDE 10 MG: 10 TABLET ORAL at 17:17

## 2019-01-07 RX ADMIN — SEVELAMER CARBONATE 2400 MG: 800 TABLET, FILM COATED ORAL at 09:05

## 2019-01-07 RX ADMIN — SEVELAMER CARBONATE 2400 MG: 800 TABLET, FILM COATED ORAL at 13:41

## 2019-01-07 RX ADMIN — SEVELAMER CARBONATE 2400 MG: 800 TABLET, FILM COATED ORAL at 18:39

## 2019-01-07 RX ADMIN — LIDOCAINE 1 PATCH: 50 PATCH TOPICAL at 05:45

## 2019-01-07 RX ADMIN — STANDARDIZED SENNA CONCENTRATE AND DOCUSATE SODIUM 2 TABLET: 8.6; 5 TABLET, FILM COATED ORAL at 17:14

## 2019-01-07 RX ADMIN — HEPARIN SODIUM 5000 UNITS: 5000 INJECTION, SOLUTION INTRAVENOUS; SUBCUTANEOUS at 21:45

## 2019-01-07 RX ADMIN — TRAZODONE HYDROCHLORIDE 50 MG: 50 TABLET ORAL at 21:45

## 2019-01-07 RX ADMIN — ARIPIPRAZOLE 5 MG: 10 TABLET ORAL at 05:46

## 2019-01-07 RX ADMIN — HEPARIN SODIUM 5000 UNITS: 5000 INJECTION, SOLUTION INTRAVENOUS; SUBCUTANEOUS at 06:00

## 2019-01-07 RX ADMIN — OMEPRAZOLE 20 MG: 20 CAPSULE, DELAYED RELEASE ORAL at 05:45

## 2019-01-07 RX ADMIN — LACOSAMIDE 100 MG: 50 TABLET, FILM COATED ORAL at 06:00

## 2019-01-07 ASSESSMENT — ENCOUNTER SYMPTOMS
DIZZINESS: 0
DEPRESSION: 1
DIARRHEA: 0
DOUBLE VISION: 0
CHILLS: 0
ABDOMINAL PAIN: 0
HEADACHES: 0
COUGH: 0
MYALGIAS: 1
BLURRED VISION: 0
WEAKNESS: 0
ORTHOPNEA: 0
HEARTBURN: 0
NAUSEA: 0
TINGLING: 0
TREMORS: 0
VOMITING: 0
SHORTNESS OF BREATH: 0
BACK PAIN: 1
SENSORY CHANGE: 0
PALPITATIONS: 0
WHEEZING: 0
FEVER: 0
NERVOUS/ANXIOUS: 1

## 2019-01-07 ASSESSMENT — COGNITIVE AND FUNCTIONAL STATUS - GENERAL
MOBILITY SCORE: 24
SUGGESTED CMS G CODE MODIFIER MOBILITY: CH

## 2019-01-07 ASSESSMENT — GAIT ASSESSMENTS
DISTANCE (FEET): 500
GAIT LEVEL OF ASSIST: MODIFIED INDEPENDENT

## 2019-01-07 ASSESSMENT — PAIN SCALES - GENERAL
PAINLEVEL_OUTOF10: 2
PAINLEVEL_OUTOF10: 3
PAINLEVEL_OUTOF10: 2
PAINLEVEL_OUTOF10: 5

## 2019-01-07 NOTE — DISCHARGE PLANNING
"Care Transition Team Assessment    Patient on Dialysis.  LSW met with Marylu, patient's 79 year old grandmother.  Marylu explains patient was diagnosed with Lupus in her early 20's. Most recent documentation from PTOT indicates patient is not able to care for herself, the recommendation is for LTC or Group Home.    According to Marylu, Patient's father lives in  and is not involve in patient's life.  Patient has a sister in [place] who provides intermittent assistance.     Patient lives alone in a 2nd story apartment, no elevator.  According to Marylu, patient receives $800.00/ monthly on SSI. Marylu states she has to maintain employment to be able to supplement patient's rent and all her monthly expenses.  Marylu indicates patient was \"managing\" on her own; however, patient's physical condition seems to be deteriorating and will need additional support.  Marylu states, patient was in the process of moving into a 1st floor apartment and is also considering moving into a Group Home.     LSW provided general information related to GH including the approximate monthly payment, depending on patient's Level of Care.     Marylu states, she might be able to supplement the payment for a group home; however, Marylu is not certain that patient will agree to GH due to having to share a room.     LSW will follow up tomorrow with patient regarding GH.  LSW e-mailed PFA to established if this patient has been screened for Medicaid to assist paying for LTC.      Information Source  Orientation : Oriented x 4  Information Given By: Patient, Relative  Informant's Name: Marylu  Who is responsible for making decisions for patient? : Patient    Readmission Evaluation  Is this a readmission?: No    Elopement Risk  Legal Hold: No  Ambulatory or Self Mobile in Wheelchair: Yes  Disoriented: No  Psychiatric Symptoms: None  History of Wandering: No  Elopement this Admit: No  Vocalizing Wanting to Leave: No  Displays Behaviors, Body Language Wanting to " Leave: No-Not at Risk for Elopement  Elopement Risk: Not at Risk for Elopement    Interdisciplinary Discharge Planning  Lives with - Patient's Self Care Capacity: Alone and Unable to Care For Self  Patient or legal guardian wants to designate a caregiver (see row info): No  Support Systems: Family Member(s), Friends / Neighbors  Housing / Facility: 2 Story Apartment / Condo  Do You Take your Prescribed Medications Regularly: Yes  Able to Return to Previous ADL's: No  Mobility Issues: Yes  Prior Services: None  Patient Expects to be Discharged to:: home  Assistance Needed: Yes  Durable Medical Equipment: Home Oxygen (sometimes)    Discharge Preparedness  What is your plan after discharge?: Home with help, Group home  What are your discharge supports?: Grandparent  Prior Functional Level: Needs Assist with Activities of Daily Living  Difficulty with ADLs: Walking  Difficulty with ADLs Comment:  (Lupus DX)    Functional Assessment  Prior Functional Level: Needs Assist with Activities of Daily Living    Finances  Financial Barriers to Discharge: No  Prescription Coverage: Yes    Vision / Hearing Impairment  Vision Impairment : Yes  Right Eye Vision: Impaired, Wears Glasses  Left Eye Vision: Impaired, Wears Glasses  Hearing Impairment : No    Values / Beliefs / Concerns  Values / Beliefs Concerns : No    Advance Directive  Advance Directive?: None  Advance Directive offered?: AD Booklet refused    Domestic Abuse  Have you ever been the victim of abuse or violence?: No  Physical Abuse or Sexual Abuse: No  Verbal Abuse or Emotional Abuse: No  Possible Abuse Reported to:: Not Applicable    Psychological Assessment  History of Substance Abuse: None  History of Psychiatric Problems: No  Non-compliant with Treatment: No  Newly Diagnosed Illness: No         Anticipated Discharge Information  Anticipated discharge disposition: Group home, Home  Discharge Address: [address] [place] [address]

## 2019-01-07 NOTE — PROGRESS NOTES
[facility] Medicine Daily Progress Note    Date of Service  1/7/2019    Chief Complaint  38 y.o. Female with h/o seizure disorder, ESRD on HD admitted 1/1/2019 with weakness and low back pain.    Hospital Course    Patient missed HD secondary to severe back pain.  MRI L-spine negative.  Suspect muscle strain.  Continued on MWF HD.  Severe depression and medical non-compliance.  Anticipate group home at d/c.        Interval Problem Update  1/5:  Very flat affect.  Intermittent word finding difficulty.  Reports improved back pain.  Volume status stable.  1/6:  Thought process appears to be improved today.  Requesting to shower.  States she wants to go to a group home at d/c.    1/7:  Mood stable.  Pain controlled.  VSS.      Consultants/Specialty  Nephrologist  Neurologist s/o  Psychiatrist    Code Status  Full    Disposition  Pending group home at d/c.  SW following.    Review of Systems  Review of Systems   Constitutional: Negative for chills and fever.   HENT: Negative for congestion.    Eyes: Negative for blurred vision and double vision.   Respiratory: Negative for shortness of breath.    Cardiovascular: Negative for chest pain, palpitations and leg swelling.   Gastrointestinal: Negative for abdominal pain, diarrhea, heartburn, nausea and vomiting.   Genitourinary:        HD   Musculoskeletal: Positive for back pain and myalgias.   Skin: Negative for itching and rash.   Neurological: Negative for dizziness, tingling, tremors, sensory change, weakness and headaches.   Psychiatric/Behavioral: Positive for depression. The patient is nervous/anxious.         Physical Exam  Temp:  [36.4 °C (97.5 °F)-37.1 °C (98.8 °F)] 37.1 °C (98.8 °F)  Pulse:  [] 90  Resp:  [16-18] 17  BP: (133-185)/() 161/89    Physical Exam   Constitutional: She is oriented to person, place, and time. She appears well-developed and well-nourished. No distress.   HENT:   Head: Normocephalic and atraumatic.   Right Ear: External ear normal.    Left Ear: External ear normal.   Eyes: Conjunctivae are normal. Right eye exhibits no discharge. Left eye exhibits no discharge.   Neck: Normal range of motion. No tracheal deviation present.   Cardiovascular: Normal rate, regular rhythm and intact distal pulses.  Exam reveals no friction rub.    No murmur heard.  Pulmonary/Chest: Effort normal and breath sounds normal. No stridor. No respiratory distress. She has no wheezes.   Abdominal: Soft. Bowel sounds are normal. She exhibits no distension. There is no tenderness.   Musculoskeletal: Normal range of motion. She exhibits no edema or deformity.   Neurological: She is alert and oriented to person, place, and time.   Skin: Skin is warm and dry. No rash noted. She is not diaphoretic. No erythema.   Psychiatric: Her speech is delayed. She is slowed and withdrawn.   Nursing note and vitals reviewed.      Fluids    Intake/Output Summary (Last 24 hours) at 01/07/19 1538  Last data filed at 01/07/19 1200   Gross per 24 hour   Intake             1200 ml   Output                0 ml   Net             1200 ml       Laboratory  Recent Labs      01/05/19   0500  01/06/19   0420  01/07/19   0359   WBC  5.2  6.9  6.9   RBC  3.40*  3.57*  3.13*   HEMOGLOBIN  10.0*  10.7*  9.3*   HEMATOCRIT  31.5*  32.6*  29.0*   MCV  92.6  91.3  92.7   MCH  29.4  30.0  29.7   MCHC  31.7*  32.8*  32.1*   RDW  42.1  41.7  41.9   PLATELETCT  161*  172  159*   MPV  11.0  11.1  11.2     Recent Labs      01/05/19   0500  01/06/19   0420  01/07/19   0329   SODIUM  139  138  137   POTASSIUM  4.1  4.2  4.3   CHLORIDE  102  100  100   CO2  27  24  25   GLUCOSE  91  98  91   BUN  21  33*  40*   CREATININE  6.12*  7.82*  10.14*   CALCIUM  10.0  9.9  9.7                   Imaging   MR-LUMBAR SPINE-W/O   Final Result      1.  Diffusely decreased signal again seen throughout the marrow space consistent with red marrow conversion, likely secondary to chronic anemia.   2.  No significant disc bulging, central  canal narrowing, or foraminal narrowing.   3.  No lumbar spine fracture or subluxation.           Assessment/Plan  Medical non-compliance- (present on admission)   Assessment & Plan    Patient at this point has been counseled on compliance she says she does not want to be noncompliant on purpose.  The patient's non-compliance seems to be secondary to her major depression.  Mood slowly improving.  Psychiatry following.  Pending group home for discharge.     ESRD (end stage renal disease) on dialysis ([facility])- (present on admission)   Assessment & Plan    Missed HD secondary to back pain.  Resumed MWF HD while in the hospital.  Volume status stable.  Nephrology following.     Failure to thrive in adult   Assessment & Plan    PT OT evaluation done.  Would benefit from group home.  SW following.     Hypertension- (present on admission)   Assessment & Plan    Continue with blood pressure management optimization keep systolic blood pressure under 140 diastolic under 90     Lupus (systemic lupus erythematosus) ([facility])- (present on admission)   Assessment & Plan    Chronic  At baseline  Continue Plaquenil.  Continue to be managed as outpatient.     Chronic lupus nephritis ([facility])- (present on admission)   Assessment & Plan    Continue with monitoring at this point for further deterioration and have PT OT evaluation done     DAVI (obstructive sleep apnea)- (present on admission)   Assessment & Plan    Patient will benefit from nighttime oxygen support.     Seizure disorder ([facility])- (present on admission)   Assessment & Plan    No witnessed seizure activity on admission.  Continue vimpat  D/C [facility]  Neurology s/o  Follow as outpatient.     Anemia in chronic kidney disease, on chronic dialysis ([facility])   Assessment & Plan    No evidence of active bleed.  Continue oral iron replacement.        Chronic pain disorder- (present on admission)   Assessment & Plan    Continue with pain management optimization.       Obesity (BMI 30-39.9)-  (present on admission)   Assessment & Plan    Weight loss education provided.     Depression- (present on admission)   Assessment & Plan    Very flat affect.  Continue abilify  Psychiatry following.  Will need continued outpatient psychiatry.       Low back pain- (present on admission)   Assessment & Plan    MRI does not indicates severe spinal stenosis.    Likely muscle strain.  Pain improving.  Able to mobilize.     Thrombocytopenia (CMS-HCC)- (present on admission)   Assessment & Plan    Chronic, stable, patient currently is not bleeding.    Secondary to SLE.     Anxiety- (present on admission)   Assessment & Plan    Chronic.  Stable at this point.     Fibromyalgia- (present on admission)   Assessment & Plan    Continue with pain management optimization.          VTE prophylaxis: Heparin

## 2019-01-07 NOTE — PROGRESS NOTES
"Patient is A&Ox4, anxious at this time, reports \"I heard the girls talking outside about someone taking me away.\" Pt verbalized that this is the first time she has had these thoughts. Pt denies SI or HI. Plan of care discussed with patient to decrease anxiety and paranoia.   She denies pain at this time.   CHESTER, CMS intact, denies new onset numbness and tingling.   On RA, denies SOB or chest pain.  Normoactive BS x 4. Tolerating renal diet. Reports mild nausea, declines intervention at this time.   + flatus, + BM 1/6. Pt is anuric.  AV fistula in place over RUE, + thrill, + bruit.   Chest port accessed, dressing intact.   Updated on POC. Belongings and call light within reach. All needs met at this time.   "

## 2019-01-07 NOTE — PROGRESS NOTES
Nephrology Daily Progress Note    Date of Service  1/7/2019    Chief Complaint  38 y.o. female admitted 1/1/2019 with ESRD, missed HD    Interval Problem Update  HD on a MWF schedule  Events noted  Elevated BP  -added amlodipine -under better control  Seen during dialysis -tolerates well.    Review of Systems  Review of Systems   Constitutional: Positive for malaise/fatigue. Negative for chills and fever.   HENT: Negative.    Respiratory: Negative for cough, shortness of breath and wheezing.    Cardiovascular: Negative for chest pain, palpitations and orthopnea.   Gastrointestinal: Negative for abdominal pain, nausea and vomiting.   Genitourinary: Negative for dysuria.   Musculoskeletal: Positive for back pain.   All other systems reviewed and are negative.       Physical Exam  Temp:  [36.4 °C (97.6 °F)-37.1 °C (98.8 °F)] 37.1 °C (98.8 °F)  Pulse:  [] 90  Resp:  [16-18] 17  BP: (133-185)/() 161/89    Physical Exam   Constitutional: She is oriented to person, place, and time. No distress.   HENT:   Head: Normocephalic and atraumatic.   Eyes: Pupils are equal, round, and reactive to light. Conjunctivae and EOM are normal.   Neck: Normal range of motion. Neck supple.   Cardiovascular: Normal rate and regular rhythm.  Exam reveals no gallop and no friction rub.    Pulmonary/Chest: Effort normal and breath sounds normal. No respiratory distress. She has no wheezes. She has no rales.   Abdominal: Soft. Bowel sounds are normal. She exhibits no distension. There is no tenderness.   Neurological: She is alert and oriented to person, place, and time. No cranial nerve deficit.   Skin: Skin is warm and dry. No rash noted. She is not diaphoretic. No erythema.       Fluids    Intake/Output Summary (Last 24 hours) at 01/07/19 1555  Last data filed at 01/07/19 1200   Gross per 24 hour   Intake             1200 ml   Output                0 ml   Net             1200 ml       Laboratory  Recent Labs      01/05/19   0500   01/06/19   0420  01/07/19   0359   WBC  5.2  6.9  6.9   RBC  3.40*  3.57*  3.13*   HEMOGLOBIN  10.0*  10.7*  9.3*   HEMATOCRIT  31.5*  32.6*  29.0*   MCV  92.6  91.3  92.7   MCH  29.4  30.0  29.7   MCHC  31.7*  32.8*  32.1*   RDW  42.1  41.7  41.9   PLATELETCT  161*  172  159*   MPV  11.0  11.1  11.2     Recent Labs      01/05/19   0500  01/06/19   0420  01/07/19   0329   SODIUM  139  138  137   POTASSIUM  4.1  4.2  4.3   CHLORIDE  102  100  100   CO2  27  24  25   GLUCOSE  91  98  91   BUN  21  33*  40*   CREATININE  6.12*  7.82*  10.14*   CALCIUM  10.0  9.9  9.7                   Imaging  MR-LUMBAR SPINE-W/O   Final Result      1.  Diffusely decreased signal again seen throughout the marrow space consistent with red marrow conversion, likely secondary to chronic anemia.   2.  No significant disc bulging, central canal narrowing, or foraminal narrowing.   3.  No lumbar spine fracture or subluxation.           Assessment/Plan  1. ESRD/HD -MWF -please see dialysis flow sheet for details  2. Back pain - MRI negative  3. Anemia - on epogen  4. HTN: elevated BP -added amlodipine -under better control  Recs:   -HD on a MWF schedule  -renal diet

## 2019-01-07 NOTE — CARE PLAN
Problem: Nutritional:  Goal: Achieve adequate nutritional intake  Patient will resume diet and consume >50% of meals   Outcome: MET Date Met: 01/07/19  Renal diet resumed; PO avg 50% or greater for meals and supplements.

## 2019-01-07 NOTE — PROGRESS NOTES
"Pt hypertensive throughout day. PRNs given. Mood increasing. Pt less withdrawn with care. Pt ambulating multiple times today. Norvasc 10 mg QD added per MD to start tomorrow.    1500: Pt grandma visited and brought pt clean clothes. Pt planned to shower, but once her grandma left, pt stated, \"they are laughing at me for crying when my grandma left. She is the only family I have.\" This RN had a one to one discussion, walked with pt and calmed pt down. Pt decided not to shower, but was less anxious after discussion.    /100 at 1728. Clonidine PRN given per MAR.  BP recheck at 1840: 185/110. Clonidine given. See MAR for detail. Pt beginning to feel paranoid. Pt states, \"Can you just let me get dressed anyways since I'm going to group home? The two girls out there have been talking about me all day and saying that I am trouble and going to group home.\" This RN reassured pt that no one had said that and that she was safe here in my care. Pt states, \"no matter what you say, I will still believe what I heard.\" Pt states that she is not hearing voices and that she isn't seeing things that are not present. Pt states, \"I think my blood pressure is high because I'm so stressed.\"     This RN informed NOC RN. RN to follow up.  "

## 2019-01-07 NOTE — PROGRESS NOTES
"Report received. POC discussed. Assumed care of pt.  Call light in reach. Hourly rounding in progress.  AxOx4. Calls appropriately. Pt has flat affect. No signs of paranoia or anxiety. Pt states, \"I am sorry for scaring you last night. I didn't mean it.\"  Pt gets up self Pt diet Renal, low intake noted LBM 1/5  AV fistula to LUE. Bruit and thrill noted.  Port to L. chest running TKO.   Pt O2 sat 96 on RA   Pain 2/10, declines intervention  Pt anuric. Dialysis MWF +flatus  All needs met at this time.     VSS this AM. PRNs for BP available if needed.   This RN called dialysis. Dialysis scheduled for this afternoon per dialysis RN. Pt notified.   "

## 2019-01-07 NOTE — CARE PLAN
Problem: Psychosocial Needs:  Goal: Level of anxiety will decrease  Outcome: PROGRESSING AS EXPECTED  Plan of care discussed with patient.     Problem: Safety  Goal: Will remain free from injury  Outcome: PROGRESSING AS EXPECTED  Safety precautions in place. Bed in locked/low position. 2 side rails up. Treaded socks. Call light in reach, calls appropriately. Hourly rounding practiced.

## 2019-01-07 NOTE — PROGRESS NOTES
Lab called with critical lab value. Crt: 10.14. APRN notified. No new orders received. Pt scheduled for dialysis at noon. VSS

## 2019-01-07 NOTE — PROGRESS NOTES
1200: 161/89. BP meds held. Pt transported via bed down to dialysis. This RN updated dialysis RN Monica. Chart with pt.

## 2019-01-07 NOTE — CARE PLAN
Problem: Pain Management  Goal: Pain level will decrease to patient's comfort goal  Outcome: PROGRESSING AS EXPECTED  Pt educated on the 1-10 pain scale. Pt verbalizes understanding. Pt states that current drug regimen is effectively controlling pain. Oxy 20 PRN. No complaints of pain this AM. Lidocaine patch to lower back.      Problem: Psychosocial Needs:  Goal: Level of anxiety will decrease  Outcome: PROGRESSING SLOWER THAN EXPECTED  Psych following. No signs of paranoia or anxiety this AM. Pt smiling periodically. Pt educated on interventions and POC. Pt encouraged to ambulate in halls. Will continue to encourage.

## 2019-01-08 LAB
ANION GAP SERPL CALC-SCNC: 13 MMOL/L (ref 0–11.9)
BUN SERPL-MCNC: 23 MG/DL (ref 8–22)
CALCIUM SERPL-MCNC: 9.9 MG/DL (ref 8.5–10.5)
CHLORIDE SERPL-SCNC: 97 MMOL/L (ref 96–112)
CO2 SERPL-SCNC: 29 MMOL/L (ref 20–33)
CREAT SERPL-MCNC: 6.68 MG/DL (ref 0.5–1.4)
ERYTHROCYTE [DISTWIDTH] IN BLOOD BY AUTOMATED COUNT: 43.9 FL (ref 35.9–50)
GLUCOSE SERPL-MCNC: 93 MG/DL (ref 65–99)
HCT VFR BLD AUTO: 31.8 % (ref 37–47)
HGB BLD-MCNC: 10.1 G/DL (ref 12–16)
MCH RBC QN AUTO: 29.8 PG (ref 27–33)
MCHC RBC AUTO-ENTMCNC: 31.8 G/DL (ref 33.6–35)
MCV RBC AUTO: 93.8 FL (ref 81.4–97.8)
PLATELET # BLD AUTO: 160 K/UL (ref 164–446)
PMV BLD AUTO: 10.9 FL (ref 9–12.9)
POTASSIUM SERPL-SCNC: 4.3 MMOL/L (ref 3.6–5.5)
RBC # BLD AUTO: 3.39 M/UL (ref 4.2–5.4)
SODIUM SERPL-SCNC: 139 MMOL/L (ref 135–145)
WBC # BLD AUTO: 8.6 K/UL (ref 4.8–10.8)

## 2019-01-08 PROCEDURE — A9270 NON-COVERED ITEM OR SERVICE: HCPCS | Performed by: INTERNAL MEDICINE

## 2019-01-08 PROCEDURE — 99232 SBSQ HOSP IP/OBS MODERATE 35: CPT | Performed by: PSYCHIATRY & NEUROLOGY

## 2019-01-08 PROCEDURE — 700111 HCHG RX REV CODE 636 W/ 250 OVERRIDE (IP): Performed by: HOSPITALIST

## 2019-01-08 PROCEDURE — 85027 COMPLETE CBC AUTOMATED: CPT

## 2019-01-08 PROCEDURE — 770006 HCHG ROOM/CARE - MED/SURG/GYN SEMI*

## 2019-01-08 PROCEDURE — A9270 NON-COVERED ITEM OR SERVICE: HCPCS | Performed by: PSYCHIATRY & NEUROLOGY

## 2019-01-08 PROCEDURE — 700102 HCHG RX REV CODE 250 W/ 637 OVERRIDE(OP): Performed by: INTERNAL MEDICINE

## 2019-01-08 PROCEDURE — 700102 HCHG RX REV CODE 250 W/ 637 OVERRIDE(OP): Performed by: PSYCHIATRY & NEUROLOGY

## 2019-01-08 PROCEDURE — 80048 BASIC METABOLIC PNL TOTAL CA: CPT

## 2019-01-08 PROCEDURE — 700102 HCHG RX REV CODE 250 W/ 637 OVERRIDE(OP): Performed by: HOSPITALIST

## 2019-01-08 PROCEDURE — A9270 NON-COVERED ITEM OR SERVICE: HCPCS | Performed by: HOSPITALIST

## 2019-01-08 PROCEDURE — 700101 HCHG RX REV CODE 250: Performed by: HOSPITALIST

## 2019-01-08 PROCEDURE — 99232 SBSQ HOSP IP/OBS MODERATE 35: CPT | Performed by: INTERNAL MEDICINE

## 2019-01-08 PROCEDURE — 99232 SBSQ HOSP IP/OBS MODERATE 35: CPT | Performed by: FAMILY MEDICINE

## 2019-01-08 RX ADMIN — HYDROXYCHLOROQUINE SULFATE 200 MG: 200 TABLET, FILM COATED ORAL at 20:22

## 2019-01-08 RX ADMIN — HEPARIN SODIUM 5000 UNITS: 5000 INJECTION, SOLUTION INTRAVENOUS; SUBCUTANEOUS at 05:20

## 2019-01-08 RX ADMIN — OXYCODONE HYDROCHLORIDE 20 MG: 10 TABLET ORAL at 00:02

## 2019-01-08 RX ADMIN — OMEPRAZOLE 20 MG: 20 CAPSULE, DELAYED RELEASE ORAL at 05:21

## 2019-01-08 RX ADMIN — AMLODIPINE BESYLATE 10 MG: 10 TABLET ORAL at 05:20

## 2019-01-08 RX ADMIN — LACOSAMIDE 100 MG: 50 TABLET, FILM COATED ORAL at 05:20

## 2019-01-08 RX ADMIN — OXYCODONE HYDROCHLORIDE 10 MG: 10 TABLET ORAL at 09:02

## 2019-01-08 RX ADMIN — SEVELAMER CARBONATE 2400 MG: 800 TABLET, FILM COATED ORAL at 08:44

## 2019-01-08 RX ADMIN — HEPARIN SODIUM 5000 UNITS: 5000 INJECTION, SOLUTION INTRAVENOUS; SUBCUTANEOUS at 22:24

## 2019-01-08 RX ADMIN — OXYCODONE HYDROCHLORIDE AND ACETAMINOPHEN 500 MG: 500 TABLET ORAL at 05:21

## 2019-01-08 RX ADMIN — SEVELAMER CARBONATE 2400 MG: 800 TABLET, FILM COATED ORAL at 17:57

## 2019-01-08 RX ADMIN — OXYCODONE HYDROCHLORIDE 20 MG: 10 TABLET ORAL at 16:24

## 2019-01-08 RX ADMIN — Medication 325 MG: at 05:20

## 2019-01-08 RX ADMIN — VITAMIN D, TAB 1000IU (100/BT) 10000 UNITS: 25 TAB at 05:31

## 2019-01-08 RX ADMIN — SEVELAMER CARBONATE 2400 MG: 800 TABLET, FILM COATED ORAL at 12:49

## 2019-01-08 RX ADMIN — HEPARIN SODIUM 5000 UNITS: 5000 INJECTION, SOLUTION INTRAVENOUS; SUBCUTANEOUS at 14:04

## 2019-01-08 RX ADMIN — STANDARDIZED SENNA CONCENTRATE AND DOCUSATE SODIUM 2 TABLET: 8.6; 5 TABLET, FILM COATED ORAL at 17:57

## 2019-01-08 RX ADMIN — LIDOCAINE 1 PATCH: 50 PATCH TOPICAL at 05:21

## 2019-01-08 RX ADMIN — ONDANSETRON 4 MG: 2 INJECTION INTRAMUSCULAR; INTRAVENOUS at 03:45

## 2019-01-08 RX ADMIN — LACOSAMIDE 100 MG: 50 TABLET, FILM COATED ORAL at 17:57

## 2019-01-08 RX ADMIN — TRAZODONE HYDROCHLORIDE 50 MG: 50 TABLET ORAL at 20:22

## 2019-01-08 RX ADMIN — ARIPIPRAZOLE 5 MG: 10 TABLET ORAL at 05:20

## 2019-01-08 ASSESSMENT — ENCOUNTER SYMPTOMS
HEADACHES: 0
WHEEZING: 0
DOUBLE VISION: 0
ORTHOPNEA: 0
WEIGHT LOSS: 0
ABDOMINAL PAIN: 0
TINGLING: 0
NAUSEA: 0
CHILLS: 0
DIZZINESS: 0
DEPRESSION: 1
DIARRHEA: 0
FEVER: 0
NERVOUS/ANXIOUS: 1
SHORTNESS OF BREATH: 0
PALPITATIONS: 0
HEMOPTYSIS: 0
VOMITING: 0
HEARTBURN: 0
PHOTOPHOBIA: 0
COUGH: 0
BACK PAIN: 1
MYALGIAS: 1
BLURRED VISION: 0

## 2019-01-08 ASSESSMENT — PAIN SCALES - GENERAL
PAINLEVEL_OUTOF10: 8
PAINLEVEL_OUTOF10: 6
PAINLEVEL_OUTOF10: 6
PAINLEVEL_OUTOF10: 4

## 2019-01-08 ASSESSMENT — LIFESTYLE VARIABLES: SUBSTANCE_ABUSE: 0

## 2019-01-08 NOTE — THERAPY
"Physical Therapy Treatment completed.   Bed Mobility:  Supine to Sit: Independent  Transfers: Sit to Stand: Modified Independent  Gait: Level Of Assist: Modified Independent with No Equipment Needed       Discharge Recommendations: Equipment: No Equipment Needed.     See \"Rehab Therapy-Acute\" Patient Summary Report for complete documentation.     Pt progressing well w/ therapy. Pt is able to perform all mobility w/out assistance at a Rylee level. She was able to ambulate 500 feet w/out and AD and was able to tolerate dynamic disturbances. Pt currently has met all therapy goals and no longer requires skilled acute physical therapy.  "

## 2019-01-08 NOTE — CARE PLAN
Problem: Mobility  Goal: Risk for activity intolerance will decrease  Outcome: PROGRESSING AS EXPECTED  Pt ambulated x 1 around the unit. Her gait was steady.     Problem: Safety  Goal: Will remain free from injury  Outcome: PROGRESSING AS EXPECTED  Safety precautions in place. Bed in locked/low position. 2 side rails up. Treaded socks. Call light in reach, calls appropriately. Hourly rounding practiced.

## 2019-01-08 NOTE — PSYCHIATRY
"PSYCHIATRIC FOLLOW-UP:  Reason for admission: Failure to thrive in adult  Anemia of renal disease  SLE (systemic lupus erythematosus) (HCC)  End-stage renal disease due to systemic lupus erythematosus (HCC)    Legal status: Not applicable    Chief Complaint: \" pretty good\"    HPI: Debby Carrizales is a 38 y.o. year old female with a PMH of ESRD, SLE, who presented to [facility] complaining of missed dialysis due to weakness and low back pain. She was experiencing paranoia and odd behavior a few days ago. Today upon entry the patient and grandmother were sitting together having a conversation. The patient stated she feels better and is able to walk. She stated the paranoia and hallucinations had resolved as well. She is still very depressed and says it helps that she has family near her at this time. She was also discussing plans after discharge as she stated that she would go down to welfare to get straightened out. I asked the grandmother if the patient seemed like her normal self and she said the patient seems like herself and also mentioned becoming concerned a few days ago when she was receiving [employer] text messages from the patient. She denies suicidal ideation, homicidal ideation, and delusions.     Psychiatric Examination:  Vitals: Blood pressure 106/56, pulse 64, temperature 37.2 °C (99 °F), temperature source Temporal, resp. rate 17, height 1.651 m (5' 5\"), weight 74.6 kg (164 lb 7.4 oz), SpO2 97 %, not currently breastfeeding.  Musculoskeletal: normal psychomotor activity, no tics or unusual mannerisms noted  Appearance and Eye Contact: WDWN, appropriate dress and grooming. Behavior is calm, cooperative,  appropriate eye contact  Attention/Alertness: Alert  Thought Process: Linear, Logical and Goal Directed    Thought Content: [place]  Speech: Clear with normal rate and rhythm  Mood: \"Depressed\"            Affect: Congruent with mood         SI/HI: Denies  Memory: Recent and remote memory " appear intact    Orientation: alert, fully oriented  Insight into symptoms: fair  Judgement into symptoms:fair    ASSESSMENT:     DSM5 Diagnostic Considerations:     Psychotic disorder unspecified  Depressive disorder unspecified     PLAN:  Continue current psychiatric medications   Legal status: Not applicable   Anticipate F/U within 48 hours.   Records reviewed: Yes  Discussed patient with other provider: Mercy Loya MD  Will continue to follow    GREG VeronicaP-BC

## 2019-01-08 NOTE — CARE PLAN
"Problem: Psychosocial Needs:  Goal: Level of anxiety will decrease  Outcome: PROGRESSING AS EXPECTED  Pt doing well today on her new meds. Denies any paranoia today. Psych at bedside now rounding with pt. Pt states that she \"feels much better today than I did the other day\"    Problem: Safety  Goal: Will remain free from injury  Outcome: PROGRESSING AS EXPECTED  Patient educated on the importance of calling a staff member before getting out of bed. Patient verbalizes understanding and patient calling appropriately. Bed in lowest position, call light and other belongings within reach, treaded socks on, and hourly rounding in place. Bed alarm off      "

## 2019-01-08 NOTE — PROGRESS NOTES
Psych notified of pt paranoia and anxiety events last night. Abilify added 3 days ago. Psych MD to follow up.

## 2019-01-08 NOTE — PROGRESS NOTES
"/77   Pulse 93   Temp 37.1 °C (98.8 °F) (Temporal)   Resp 18   Ht 1.651 m (5' 5\")   Wt 74.6 kg (164 lb 7.4 oz)   SpO2 94%   Breastfeeding? No   BMI 27.37 kg/m²     Patient is A&Ox4.   Denies pain.   CHESTER, CMS intact, baseline numbness and tingling to toes.   Mobilizes independently with steady gait, calls appropriately.   On RA, denies SOB or chest pain.   Normoactive BS x 4. Tolerating renal diet. Denies nausea/vomiting.   + flatus, LBM 1/7. Pt is anuric.   AV fistula to RUE + thrill, + bruit.   Port accessed, dressing intact.   Updated on POC. Belongings and call light within reach. All needs met at this time.   "

## 2019-01-08 NOTE — DISCHARGE PLANNING
Transitional Care Coordinator:    Per chart review pt may benefit from home health services.  Pt's LACE+ score indicates a high risk of readmission.  Please consider a home health order and F2F for discharge planning.

## 2019-01-08 NOTE — PROGRESS NOTES
"Pt reports difficulty swallowing, noting an area just above sternum that feels \"blocked\" she says it feels like she has to burp but cant. She says it is difficult to eat and she just coughed up some \"phlegm\" that resembles chewed pineapple. Will endorse to MD on rounds.   " Hospitalist

## 2019-01-08 NOTE — PROGRESS NOTES
AOx4  Fatigued, states that she didn't get much sleep last night    Pain rated 6/10 in her back- medicated per MAR    Pt on room air. Denies SOB. Lungs clear.     Bowels normoactive. Denies nausea this morning.   Sitting up in bed eating.     Baseline numbness and tingling in bilateral hands and feet from neuropathy.     Right sided chest port assessed- dressing is CDI. Running TKO fluids at 10 ml/hr per protocol.     AV fistula on the left upper arm visualized, no dressing on. + thrill, + bruit    POC discussed. Bed in lowest and locked position. Call light within reach. Hourly rounding in place.

## 2019-01-08 NOTE — PROGRESS NOTES
Hemodialysis ordered by Dr. Shaver. Treatment started at 1224 and ended at 1524. Pt stable, vss, no c/o post tx. See flow sheets for details. Net UF 1.7 L. Report to LILLIANA Manrique RN. Rt ua avg dressing clean, dry, intact.

## 2019-01-08 NOTE — PROGRESS NOTES
Nephrology Daily Progress Note    Date of Service  1/8/2019    Chief Complaint  38 y.o. female admitted 1/1/2019 with ESRD, missed HD    Interval Problem Update  HD on a MWF schedule  No acute events  No new complaints  Elevated BP -improved    Review of Systems  Review of Systems   Constitutional: Positive for malaise/fatigue. Negative for chills and fever.   HENT: Negative.    Respiratory: Negative for cough, shortness of breath and wheezing.    Cardiovascular: Negative for chest pain, palpitations and orthopnea.   Gastrointestinal: Negative for abdominal pain, nausea and vomiting.   Genitourinary: Negative for dysuria.   Musculoskeletal: Positive for back pain.   All other systems reviewed and are negative.       Physical Exam  Temp:  [36.1 °C (97 °F)-37.2 °C (99 °F)] 37.2 °C (99 °F)  Pulse:  [64-93] 64  Resp:  [16-18] 17  BP: (104-120)/(56-77) 106/56    Physical Exam   Constitutional: She is oriented to person, place, and time. No distress.   HENT:   Head: Normocephalic and atraumatic.   Eyes: Pupils are equal, round, and reactive to light. Conjunctivae and EOM are normal.   Neck: Normal range of motion. Neck supple.   Cardiovascular: Normal rate and regular rhythm.  Exam reveals no gallop and no friction rub.    Pulmonary/Chest: Effort normal and breath sounds normal. No respiratory distress. She has no wheezes. She has no rales.   Abdominal: Soft. Bowel sounds are normal. She exhibits no distension. There is no tenderness.   Neurological: She is alert and oriented to person, place, and time. No cranial nerve deficit.   Skin: Skin is warm and dry. No rash noted. She is not diaphoretic. No erythema.       Fluids    Intake/Output Summary (Last 24 hours) at 01/08/19 1240  Last data filed at 01/08/19 1000   Gross per 24 hour   Intake             1700 ml   Output             2200 ml   Net             -500 ml       Laboratory  Recent Labs      01/06/19   0420  01/07/19   0359  01/08/19   0349   WBC  6.9  6.9  8.6    RBC  3.57*  3.13*  3.39*   HEMOGLOBIN  10.7*  9.3*  10.1*   HEMATOCRIT  32.6*  29.0*  31.8*   MCV  91.3  92.7  93.8   MCH  30.0  29.7  29.8   MCHC  32.8*  32.1*  31.8*   RDW  41.7  41.9  43.9   PLATELETCT  172  159*  160*   MPV  11.1  11.2  10.9     Recent Labs      01/06/19   0420  01/07/19   0329  01/08/19   0349   SODIUM  138  137  139   POTASSIUM  4.2  4.3  4.3   CHLORIDE  100  100  97   CO2  24  25  29   GLUCOSE  98  91  93   BUN  33*  40*  23*   CREATININE  7.82*  10.14*  6.68*   CALCIUM  9.9  9.7  9.9                   Imaging  MR-LUMBAR SPINE-W/O   Final Result      1.  Diffusely decreased signal again seen throughout the marrow space consistent with red marrow conversion, likely secondary to chronic anemia.   2.  No significant disc bulging, central canal narrowing, or foraminal narrowing.   3.  No lumbar spine fracture or subluxation.           Assessment/Plan  1. ESRD/HD -MWF  2. Back pain -better  3. Anemia - on epogen  4. HTN: BP under better control  Recs:   -HD on a MWF schedule  -renal diet

## 2019-01-09 VITALS
BODY MASS INDEX: 27.25 KG/M2 | DIASTOLIC BLOOD PRESSURE: 76 MMHG | OXYGEN SATURATION: 92 % | TEMPERATURE: 99.6 F | WEIGHT: 163.58 LBS | HEIGHT: 65 IN | RESPIRATION RATE: 16 BRPM | HEART RATE: 77 BPM | SYSTOLIC BLOOD PRESSURE: 96 MMHG

## 2019-01-09 LAB — HBV SURFACE AG SER QL: NEGATIVE

## 2019-01-09 PROCEDURE — 99239 HOSP IP/OBS DSCHRG MGMT >30: CPT | Performed by: FAMILY MEDICINE

## 2019-01-09 PROCEDURE — 87340 HEPATITIS B SURFACE AG IA: CPT

## 2019-01-09 PROCEDURE — 700102 HCHG RX REV CODE 250 W/ 637 OVERRIDE(OP): Performed by: HOSPITALIST

## 2019-01-09 PROCEDURE — 97535 SELF CARE MNGMENT TRAINING: CPT

## 2019-01-09 PROCEDURE — A9270 NON-COVERED ITEM OR SERVICE: HCPCS | Performed by: HOSPITALIST

## 2019-01-09 PROCEDURE — 90935 HEMODIALYSIS ONE EVALUATION: CPT | Performed by: INTERNAL MEDICINE

## 2019-01-09 PROCEDURE — 700111 HCHG RX REV CODE 636 W/ 250 OVERRIDE (IP): Performed by: HOSPITALIST

## 2019-01-09 PROCEDURE — 700111 HCHG RX REV CODE 636 W/ 250 OVERRIDE (IP): Mod: JG

## 2019-01-09 PROCEDURE — A9270 NON-COVERED ITEM OR SERVICE: HCPCS | Performed by: INTERNAL MEDICINE

## 2019-01-09 PROCEDURE — A9270 NON-COVERED ITEM OR SERVICE: HCPCS | Performed by: PSYCHIATRY & NEUROLOGY

## 2019-01-09 PROCEDURE — 700102 HCHG RX REV CODE 250 W/ 637 OVERRIDE(OP): Performed by: INTERNAL MEDICINE

## 2019-01-09 PROCEDURE — 700102 HCHG RX REV CODE 250 W/ 637 OVERRIDE(OP): Performed by: PSYCHIATRY & NEUROLOGY

## 2019-01-09 PROCEDURE — 90935 HEMODIALYSIS ONE EVALUATION: CPT

## 2019-01-09 PROCEDURE — 700101 HCHG RX REV CODE 250: Performed by: HOSPITALIST

## 2019-01-09 RX ORDER — DIPHENHYDRAMINE HCL 25 MG
25 TABLET ORAL EVERY 6 HOURS PRN
Status: DISCONTINUED | OUTPATIENT
Start: 2019-01-09 | End: 2019-01-09 | Stop reason: HOSPADM

## 2019-01-09 RX ORDER — ARIPIPRAZOLE 5 MG/1
5 TABLET ORAL DAILY
Qty: 30 TAB | Refills: 0 | Status: SHIPPED | OUTPATIENT
Start: 2019-01-10 | End: 2019-02-01 | Stop reason: CLARIF

## 2019-01-09 RX ORDER — AMLODIPINE BESYLATE 10 MG/1
10 TABLET ORAL DAILY
Qty: 30 TAB | Refills: 0 | Status: SHIPPED | OUTPATIENT
Start: 2019-01-10 | End: 2019-06-21

## 2019-01-09 RX ADMIN — SEVELAMER CARBONATE 2400 MG: 800 TABLET, FILM COATED ORAL at 13:32

## 2019-01-09 RX ADMIN — LIDOCAINE 1 PATCH: 50 PATCH TOPICAL at 05:17

## 2019-01-09 RX ADMIN — VITAMIN D, TAB 1000IU (100/BT) 10000 UNITS: 25 TAB at 06:00

## 2019-01-09 RX ADMIN — Medication 325 MG: at 06:00

## 2019-01-09 RX ADMIN — STANDARDIZED SENNA CONCENTRATE AND DOCUSATE SODIUM 2 TABLET: 8.6; 5 TABLET, FILM COATED ORAL at 06:00

## 2019-01-09 RX ADMIN — HEPARIN SODIUM 5000 UNITS: 5000 INJECTION, SOLUTION INTRAVENOUS; SUBCUTANEOUS at 05:17

## 2019-01-09 RX ADMIN — AMLODIPINE BESYLATE 10 MG: 10 TABLET ORAL at 06:00

## 2019-01-09 RX ADMIN — OXYCODONE HYDROCHLORIDE AND ACETAMINOPHEN 500 MG: 500 TABLET ORAL at 06:00

## 2019-01-09 RX ADMIN — ARIPIPRAZOLE 5 MG: 10 TABLET ORAL at 05:20

## 2019-01-09 RX ADMIN — LACOSAMIDE 100 MG: 50 TABLET, FILM COATED ORAL at 06:00

## 2019-01-09 RX ADMIN — SEVELAMER CARBONATE 2400 MG: 800 TABLET, FILM COATED ORAL at 09:07

## 2019-01-09 RX ADMIN — Medication 200 UNITS: at 17:17

## 2019-01-09 RX ADMIN — OXYCODONE HYDROCHLORIDE 10 MG: 10 TABLET ORAL at 09:07

## 2019-01-09 RX ADMIN — HEPARIN SODIUM 5000 UNITS: 5000 INJECTION, SOLUTION INTRAVENOUS; SUBCUTANEOUS at 13:31

## 2019-01-09 RX ADMIN — OMEPRAZOLE 20 MG: 20 CAPSULE, DELAYED RELEASE ORAL at 06:00

## 2019-01-09 RX ADMIN — ERYTHROPOIETIN 10000 UNITS: 10000 INJECTION, SOLUTION INTRAVENOUS; SUBCUTANEOUS at 08:42

## 2019-01-09 ASSESSMENT — PAIN SCALES - GENERAL
PAINLEVEL_OUTOF10: 7
PAINLEVEL_OUTOF10: 0

## 2019-01-09 ASSESSMENT — ENCOUNTER SYMPTOMS
PALPITATIONS: 0
NAUSEA: 0
ORTHOPNEA: 0
ABDOMINAL PAIN: 0
VOMITING: 0
CHILLS: 0
COUGH: 0
FEVER: 0
WHEEZING: 0
SHORTNESS OF BREATH: 0
BACK PAIN: 1

## 2019-01-09 ASSESSMENT — COGNITIVE AND FUNCTIONAL STATUS - GENERAL
SUGGESTED CMS G CODE MODIFIER DAILY ACTIVITY: CJ
DAILY ACTIVITIY SCORE: 22
TOILETING: A LITTLE
HELP NEEDED FOR BATHING: A LITTLE

## 2019-01-09 NOTE — PROGRESS NOTES
HEMODIALYSIS NOTES:     HD today x 3 hours per Dr. Shaver. Initiated at 0822 and ended at 1124. Patient tolerated treatment. Please see paper flowsheet for details.    UF Net: 1700 mL    Blood returned. Applied gauze and held R AVG site for 10 minutes. Verified no bleeding. Bruit and thrill present post dialysis. Instructions given to Primary RN that if bleeding occurs on the AVF site, change dressing and held the site with pressure.     Report given to TRUONG Harden RN.

## 2019-01-09 NOTE — CARE PLAN
Problem: Pain Management  Goal: Pain level will decrease to patient's comfort goal  Outcome: PROGRESSING AS EXPECTED  Pt educated on the 1-10 pain scale. Pt verbalizes understanding. Pt states that current drug regimen is effectively controlling pain.     Problem: Mobility  Goal: Risk for activity intolerance will decrease  Outcome: PROGRESSING AS EXPECTED  Pt mobilizing an adequate amount throughout the day. Pt is up by herself and steady on her feet

## 2019-01-09 NOTE — DISCHARGE PLANNING
LSW met with pt at bedside, LSW provided Pt with group home list, all questions answer including level of care between SNF and group home. LSW encourage Pt to complete application for medicaid. Pt advised she would review group home list and start looking into placement.     Unit LSW to follow up regarding group home placement

## 2019-01-09 NOTE — PROGRESS NOTES
Bedside report received.  Assessment complete.  A&O x 4. Patient calls appropriately.  Patient up with no assist.    Patient has 6/10 pain. Declines intervention at this time  Denies N&V. Tolerating renal diet.  + void, + flatus, - BM.  Patient denies SOB.  SCD's off.    Review plan with of care with patient. Call light and personal belongings with in reach. Hourly rounding in place. All needs met at this time.

## 2019-01-09 NOTE — PROGRESS NOTES
Hospital Medicine Daily Progress Note    Date of Service  1/8/2019    Chief Complaint  38 y.o. Female with h/o seizure disorder, ESRD on HD admitted 1/1/2019 with weakness and low back pain.    Hospital Course    Patient missed HD secondary to severe back pain.  MRI L-spine negative.  Suspect muscle strain.  Continued on MWF HD.  Severe depression and medical non-compliance.  Anticipate group home at d/c.        Interval Problem Update  1/5:  Very flat affect.  Intermittent word finding difficulty.  Reports improved back pain.  Volume status stable.  1/6:  Thought process appears to be improved today.  Requesting to shower.  States she wants to go to a group home at d/c.    1/7:  Mood stable.  Pain controlled.  VSS.    1/8: Laying in bed comfortably.  Avoiding eye contact.  Very brief and answering questions.  No acute distress noted.  Consultants/Specialty  Nephrologist  Neurologist s/o  Psychiatrist    Code Status  Full    Disposition  Pending group home at d/c.  SW following.    Review of Systems  Review of Systems   Constitutional: Negative for chills, fever and weight loss.   HENT: Negative for ear pain, hearing loss and tinnitus.    Eyes: Negative for blurred vision, double vision and photophobia.   Respiratory: Negative for cough, hemoptysis and shortness of breath.    Cardiovascular: Negative for chest pain, palpitations and orthopnea.   Gastrointestinal: Negative for abdominal pain, diarrhea, heartburn, nausea and vomiting.   Genitourinary:        HD   Musculoskeletal: Positive for back pain (Chronic) and myalgias.   Skin: Negative for itching.   Neurological: Negative for dizziness, tingling and headaches.   Psychiatric/Behavioral: Positive for depression. Negative for substance abuse and suicidal ideas. The patient is nervous/anxious.         Physical Exam  Temp:  [36.1 °C (97 °F)-37.2 °C (99 °F)] 36.3 °C (97.3 °F)  Pulse:  [64-93] 67  Resp:  [17-18] 17  BP: (106-114)/(56-77) 107/57    Physical Exam    Constitutional: She is oriented to person, place, and time. No distress.   HENT:   Head: Normocephalic and atraumatic.   Right Ear: External ear normal.   Left Ear: External ear normal.   Eyes: Conjunctivae are normal. Right eye exhibits no discharge. Left eye exhibits no discharge.   Neck: Normal range of motion. No tracheal deviation present.   Cardiovascular: Normal rate, regular rhythm and normal heart sounds.  Exam reveals no gallop and no friction rub.    No murmur heard.  Pulmonary/Chest: Effort normal. No respiratory distress. She has no wheezes. She has no rales.   Abdominal: Soft. Bowel sounds are normal. She exhibits no distension. There is no tenderness.   Musculoskeletal: Normal range of motion. She exhibits no edema, tenderness or deformity.   Neurological: She is alert and oriented to person, place, and time.   Skin: Skin is warm and dry. No rash noted. She is not diaphoretic. No erythema.   Psychiatric: Her speech is delayed. She is slowed and withdrawn. She exhibits a depressed mood.   Nursing note and vitals reviewed.      Fluids    Intake/Output Summary (Last 24 hours) at 01/08/19 1933  Last data filed at 01/08/19 1815   Gross per 24 hour   Intake             1320 ml   Output                0 ml   Net             1320 ml       Laboratory  Recent Labs      01/06/19   0420  01/07/19   0359  01/08/19   0349   WBC  6.9  6.9  8.6   RBC  3.57*  3.13*  3.39*   HEMOGLOBIN  10.7*  9.3*  10.1*   HEMATOCRIT  32.6*  29.0*  31.8*   MCV  91.3  92.7  93.8   MCH  30.0  29.7  29.8   MCHC  32.8*  32.1*  31.8*   RDW  41.7  41.9  43.9   PLATELETCT  172  159*  160*   MPV  11.1  11.2  10.9     Recent Labs      01/06/19   0420  01/07/19   0329  01/08/19   0349   SODIUM  138  137  139   POTASSIUM  4.2  4.3  4.3   CHLORIDE  100  100  97   CO2  24  25  29   GLUCOSE  98  91  93   BUN  33*  40*  23*   CREATININE  7.82*  10.14*  6.68*   CALCIUM  9.9  9.7  9.9                   Imaging   MR-LUMBAR SPINE-W/O   Final Result       1.  Diffusely decreased signal again seen throughout the marrow space consistent with red marrow conversion, likely secondary to chronic anemia.   2.  No significant disc bulging, central canal narrowing, or foraminal narrowing.   3.  No lumbar spine fracture or subluxation.           Assessment/Plan  Medical non-compliance- (present on admission)   Assessment & Plan    Patient at this point has been counseled on compliance she says she does not want to be noncompliant on purpose.  The patient's non-compliance seems to be secondary to her major depression.  Mood slowly improving.  Psychiatry following.  Pending group home for discharge.     ESRD (end stage renal disease) on dialysis ([facility])- (present on admission)   Assessment & Plan    Missed HD secondary to back pain.  Resumed MWF HD while in the hospital.  Volume status stable.  Nephrology following.     Failure to thrive in adult   Assessment & Plan    PT OT evaluation done.  Would benefit from group home.  SW following.     Hypertension- (present on admission)   Assessment & Plan    Continue with blood pressure management optimization keep systolic blood pressure under 140 diastolic under 90     Lupus (systemic lupus erythematosus) ([facility])- (present on admission)   Assessment & Plan    Chronic  At baseline  Continue Plaquenil.  Continue to be managed as outpatient.     Chronic lupus nephritis ([facility])- (present on admission)   Assessment & Plan    Nephrology following.      DAVI (obstructive sleep apnea)- (present on admission)   Assessment & Plan    Patient will benefit from nighttime oxygen support.     Seizure disorder ([facility])- (present on admission)   Assessment & Plan    No witnessed seizure activity on admission.  Continue vimpat  D/C [facility]  Neurology s/o  Follow as outpatient.     Anemia in chronic kidney disease, on chronic dialysis ([facility])   Assessment & Plan    No evidence of active bleed.  Continue oral iron replacement.        Chronic pain disorder- (present  on admission)   Assessment & Plan    Continue with pain management optimization.       Obesity (BMI 30-39.9)- (present on admission)   Assessment & Plan    Weight loss education provided.     Depression- (present on admission)   Assessment & Plan    Very flat affect.  Continue abilify  Psychiatry following.  Will need continued outpatient psychiatry.       Low back pain- (present on admission)   Assessment & Plan    MRI does not indicates severe spinal stenosis.    Likely muscle strain.  Pain improving.  Able to mobilize.     Thrombocytopenia (CMS-HCC)- (present on admission)   Assessment & Plan    Chronic, stable, patient currently is not bleeding.    Secondary to SLE.     Anxiety- (present on admission)   Assessment & Plan    Chronic.  Stable at this point.     Fibromyalgia- (present on admission)   Assessment & Plan    Continue with pain management optimization.     Plan of care discussed with multidisciplinary team during rounds.    VTE prophylaxis: Heparin

## 2019-01-09 NOTE — PROGRESS NOTES
AOx4  Fatigued    On room air. Denies SOB  Lungs clear    Bowels normal. Denies nausea. Tolerating diet  + gas. LBM:1/7    AV fistula assessed. + thrill, + bruit   No dressing on    Right sided chest port assessed- dressing is CDI  Running TKO fluids    POC discussed    Pt going down to dialysis

## 2019-01-09 NOTE — THERAPY
"Occupational Therapy Treatment completed with focus on ADLs and patient education.  Functional Status:  Pt was seen for Occupational Therapy treatment today, see Therapy Kardex for details.Treatment included education in breath control with activity and at rest, self pacing techs and energy conservation for pain management. Educated pt in safety awareness techs as well. Reviewed/Discussed I ADL's and who will assist as needed. Psychosocial intervention addressed. Pt's grandmother was present for education. Pt stated she \"has a friend who will assist with getting me to grocery store, who will assist with cooking as/if needed. Pt has \"dialysis transport set up and is working on finding a group home to live in.\" Reviewed DME/AE needs . Pt has all DME/Ae needs except for a reacher which she had but \"recently broke\". Info given re: where to purchase . Pt will need a removable shower head for seated bathing. Care Chest info will be issued to pt prior to d/c. Pt has all other DE needed. Pt is Mod Indep with AE for FB dressing seated base per pt and nursing. Pt also demonstrated  for Ambulating ADL's without AD. Pt is Mod Indep with toilet transfers and CNA stated pt is indirectly supervised for seated shower post set up.  Pt was left up seated EOB eating lunch. Pt just returned for dialysis, call light in reach, bedside table in front of her, grandmother present and nursing is aware. RN/SW updated on OT treatment findings and recommendations.  Continue Occupational Therapy services as per plan.    Plan of Care: Will benefit from Occupational Therapy 1 times per week  Discharge Recommendations:  Equipment reacher and removable shower head. Post-acute therapy: Pt has demonstrated a pattern of inability to care for herself at home. Recommend a group home environment for optimal medical management/compliance w/ HD. Pt stated she has assist at home as needed from friends and her grandmother. Pt is refusing Home Health services. " "SW informed.     See \"Rehab Therapy-Acute\" Patient Summary Report for complete documentation.   "

## 2019-01-09 NOTE — PROGRESS NOTES
Nephrology Daily Progress Note    Date of Service  1/9/2019    Chief Complaint  38 y.o. female admitted 1/1/2019 with ESRD, missed HD    Interval Problem Update  HD on a MWF schedule  No acute events  Seen during dialysis -tolerates well.    Review of Systems  Review of Systems   Constitutional: Positive for malaise/fatigue. Negative for chills and fever.   HENT: Negative.    Respiratory: Negative for cough, shortness of breath and wheezing.    Cardiovascular: Negative for chest pain, palpitations and orthopnea.   Gastrointestinal: Negative for abdominal pain, nausea and vomiting.   Genitourinary: Negative for dysuria.   Musculoskeletal: Positive for back pain.   All other systems reviewed and are negative.       Physical Exam  Temp:  [36.3 °C (97.3 °F)-37 °C (98.6 °F)] 36.6 °C (97.8 °F)  Pulse:  [64-69] 69  Resp:  [17-18] 17  BP: (103-120)/() 103/62    Physical Exam   Constitutional: She is oriented to person, place, and time. No distress.   HENT:   Head: Normocephalic and atraumatic.   Eyes: Pupils are equal, round, and reactive to light. Conjunctivae and EOM are normal.   Neck: Normal range of motion. Neck supple.   Cardiovascular: Normal rate and regular rhythm.  Exam reveals no gallop and no friction rub.    Pulmonary/Chest: Effort normal and breath sounds normal. No respiratory distress. She has no wheezes. She has no rales.   Abdominal: Soft. Bowel sounds are normal. She exhibits no distension. There is no tenderness.   Neurological: She is alert and oriented to person, place, and time. No cranial nerve deficit.   Skin: Skin is warm and dry. No rash noted. She is not diaphoretic. No erythema.       Fluids    Intake/Output Summary (Last 24 hours) at 01/09/19 0938  Last data filed at 01/09/19 0400   Gross per 24 hour   Intake              800 ml   Output                0 ml   Net              800 ml       Laboratory  Recent Labs      01/07/19   0359  01/08/19   0349   WBC  6.9  8.6   RBC  3.13*  3.39*    HEMOGLOBIN  9.3*  10.1*   HEMATOCRIT  29.0*  31.8*   MCV  92.7  93.8   MCH  29.7  29.8   MCHC  32.1*  31.8*   RDW  41.9  43.9   PLATELETCT  159*  160*   MPV  11.2  10.9     Recent Labs      01/07/19   0329  01/08/19   0349   SODIUM  137  139   POTASSIUM  4.3  4.3   CHLORIDE  100  97   CO2  25  29   GLUCOSE  91  93   BUN  40*  23*   CREATININE  10.14*  6.68*   CALCIUM  9.7  9.9                   Imaging  MR-LUMBAR SPINE-W/O   Final Result      1.  Diffusely decreased signal again seen throughout the marrow space consistent with red marrow conversion, likely secondary to chronic anemia.   2.  No significant disc bulging, central canal narrowing, or foraminal narrowing.   3.  No lumbar spine fracture or subluxation.           Assessment/Plan  1. ESRD/HD -MWF -please see dialysis flow sheet for details  2. Back pain - better  3. Anemia - on epogen  4. HTN: elevated BP -under better control  Recs:  -HD on a MWF schedule  -renal diet

## 2019-01-09 NOTE — CARE PLAN
Problem: Pain Management  Goal: Pain level will decrease to patient's comfort goal  Outcome: PROGRESSING AS EXPECTED  Assess pain Q 2-4 hours, administer pain medication when indicated, encourage patient to voice pain     Problem: Communication  Goal: The ability to communicate needs accurately and effectively will improve  Outcome: PROGRESSING AS EXPECTED  Review plan of care with patient, encourage patient to voice needs/concerns; encourage patient to voice questions

## 2019-01-09 NOTE — DISCHARGE PLANNING
Anticipated Discharge Disposition: Home with Community Resources.    Action: Patient's case was discussed today during IDT Rounds.  LSW explained, patient is cleared from PT and OT to discharge home.  Per MD, patient is appropriate to discharge to her apartment until she is able to find a group home.    LSW me with patient and patient's grandma at bedside.  Patient agrees to discharge home.  Patient states that several of her friends and her grandma will be available to assist with ADLS and transportation to and from dialysis, LSW notified [facility] Liaison.      Patient states she will submit the Medicaid Application to resume services upon discharge to have transportation assistance [place].     LSW provided the list of community resources for Counseling, Food Assistance,and DME, per patient's request.    Barriers to Discharge: None.    Plan: Home with community resources.

## 2019-01-10 ENCOUNTER — PATIENT OUTREACH (OUTPATIENT)
Dept: HEALTH INFORMATION MANAGEMENT | Facility: OTHER | Age: 39
End: 2019-01-10

## 2019-01-10 NOTE — PROGRESS NOTES
1/10/19 9:35am:  CM post discharge outreach call first attempt.   left requesting return call to  at [phone].

## 2019-01-10 NOTE — DISCHARGE SUMMARY
Discharge Summary    CHIEF COMPLAINT ON ADMISSION  Chief Complaint   Patient presents with   • Other       Reason for Admission  EMS     Admission Date  1/1/2019    CODE STATUS  Full Code    HPI & HOSPITAL COURSE  This is a 38 y.o. female here with end-stage renal disease on hemodialysis, history of seizure disorder, and chronic low back pain comes in with worsening low back pain and weakness.  Patient missed HD secondary to severe back pain.  MRI L-spine negative.  Continue to have her dialysis and patient per nephrology.  Patient has history of noncompliance.  Felt to be attributed to depression.  Psychiatry evaluated the patient and started the patient on Abilify.  Symptoms improved.  No seizure activity noted.  Neurology recommended to continue with Vimpat and discontinue Keppra.  Patient was hemodynamically clinically stable.  Resources has been offered for the patient for outpatient follow-up for psychiatry and neurology.  She was cleared for discharge from medical standpoint    Therefore, she is discharged in fair and stable condition to home with close outpatient follow-up.    The patient met 2-midnight criteria for an inpatient stay at the time of discharge.    Discharge Date  1/9/2019    FOLLOW UP ITEMS POST DISCHARGE  Follow-up with PCP in 1 week.  Follow-up with neurology as per recommendations.  Follow-up with psychiatry as per recommendations.  Follow-up with nephrology as per recommendations.  Follow-up with hemodialysis per schedule.    DISCHARGE DIAGNOSES  Active Problems:    ESRD (end stage renal disease) on dialysis ([facility]) POA: Yes    Medical non-compliance POA: Yes    DAVI (obstructive sleep apnea) POA: Yes    Chronic lupus nephritis (HCC) POA: Yes    Lupus (systemic lupus erythematosus) ([facility]) POA: Yes    Hypertension POA: Yes    Failure to thrive in adult POA: Unknown    Fibromyalgia POA: Yes    Anxiety POA: Yes    Thrombocytopenia (CMS-[facility]) POA: Yes    Low back pain POA: Yes    Depression POA:  Yes    Obesity (BMI 30-39.9) POA: Yes    Chronic pain disorder POA: Yes    Anemia in chronic kidney disease, on chronic dialysis (HCC) POA: Unknown    Seizure disorder (HCC) POA: Yes  Resolved Problems:    * No resolved hospital problems. *      FOLLOW UP  Future Appointments  Date Time Provider Department Center   1/17/2019 7:45 AM Quinn Chandler M.D. [facility] None   1/24/2019 10:20 AM Sushila Manzano M.D. [facility]   3/4/2019 8:00 AM Zenaida Leonardo M.D. [facility] None     No follow-up provider specified.    MEDICATIONS ON DISCHARGE     Medication List      START taking these medications      Instructions   amLODIPine 10 MG Tabs  Start taking on:  1/10/2019  Commonly known as:  NORVASC   Take 1 Tab by mouth every day.  Dose:  10 mg     aripiprazole 5 MG tablet  Start taking on:  1/10/2019  Commonly known as:  ABILIFY   Take 1 Tab by mouth every day.  Dose:  5 mg        CONTINUE taking these medications      Instructions   ascorbic acid 500 MG Tabs  Commonly known as:  ascorbic acid   Take 500 mg by mouth every day.  Dose:  500 mg     cholecalciferol 5000 UNIT Caps  Commonly known as:  VITAMIN D3   Take 10,000 Units by mouth every day.  Dose:  28141 Units     ferrous sulfate 325 (65 Fe) MG tablet   Take 325 mg by mouth every day.  Dose:  325 mg     hydroxychloroquine 200 MG Tabs  Commonly known as:  PLAQUENIL   Take 200 mg by mouth every bedtime.  Dose:  200 mg     Lacosamide 100 MG Tabs  Commonly known as:  VIMPAT   Take 100 mg by mouth 2 Times a Day for 30 days.  Dose:  100 mg     lidocaine 5 % Ptch  Commonly known as:  LIDODERM   Apply 1 Patch to skin as directed every 24 hours.  Dose:  1 Patch     omeprazole 20 MG delayed-release capsule  Commonly known as:  PRILOSEC   Take 1 Cap by mouth every day.  Dose:  20 mg     Oxycodone HCl 20 MG Tabs   Take 1 Tab by mouth every 6 hours as needed (Take up to three a day as needed and one additional pill after dialysis (3 times a week)) for up to 28 days.  Dose:  20 mg      promethazine 25 MG Tabs  Commonly known as:  PHENERGAN   Take 25 mg by mouth every 8 hours as needed for Nausea/Vomiting.  Dose:  25 mg     RENVELA 800 MG Tabs tablet  Generic drug:  sevelamer carbonate   Take 2,400 mg by mouth 3 times a day, with meals.  Dose:  2400 mg     tizanidine 4 MG Tabs  Commonly known as:  ZANAFLEX   Take 2 Tabs by mouth every bedtime.  Dose:  8 mg     traZODone 50 MG Tabs  Commonly known as:  DESYREL   Take 1 Tab by mouth every bedtime.  Dose:  50 mg        STOP taking these medications    levetiracetam 1000 MG tablet  Commonly known as:  KEPPRA     LYRICA 100 MG Caps  Generic drug:  pregabalin            Allergies  Allergies   Allergen Reactions   • Lorazepam [Ativan] Anxiety     Patient becomes severely paranoid and agitated   • Morphine Itching     Tolerates Dilaudid   • Seasonal Runny Nose and Itching     Hay fever, sabiha brush       DIET  Orders Placed This Encounter   Procedures   • Diet Order Renal     Standing Status:   Standing     Number of Occurrences:   1     Order Specific Question:   Diet:     Answer:   Renal [8]       ACTIVITY  As tolerated.  Weight bearing as tolerated    CONSULTATIONS  Nephrology  Neurology  Psychiatry    PROCEDURES  None    LABORATORY  Lab Results   Component Value Date    SODIUM 139 01/08/2019    POTASSIUM 4.3 01/08/2019    CHLORIDE 97 01/08/2019    CO2 29 01/08/2019    GLUCOSE 93 01/08/2019    BUN 23 (H) 01/08/2019    CREATININE 6.68 (HH) 01/08/2019    CREATININE 0.9 12/13/2008        Lab Results   Component Value Date    WBC 8.6 01/08/2019    HEMOGLOBIN 10.1 (L) 01/08/2019    HEMATOCRIT 31.8 (L) 01/08/2019    PLATELETCT 160 (L) 01/08/2019        Total time of the discharge process exceeds 38 minutes.

## 2019-01-10 NOTE — DISCHARGE INSTRUCTIONS
Discharge Instructions    Discharged to home by car with relative. Discharged via wheelchair, hospital escort: Yes.  Special equipment needed: Not Applicable    Be sure to schedule a follow-up appointment with your primary care doctor or any specialists as instructed.     Discharge Plan:   Diet Plan: Discussed  Activity Level: Discussed  Confirmed Follow up Appointment: Patient to Call and Schedule Appointment  Confirmed Symptoms Management: Discussed  Medication Reconciliation Updated: Yes  Pneumococcal Vaccine Administered/Refused: Not given - Patient refused pneumococcal vaccine  Influenza Vaccine Indication: Patient Refuses    I understand that a diet low in cholesterol, fat, and sodium is recommended for good health. Unless I have been given specific instructions below for another diet, I accept this instruction as my diet prescription.   Other diet: renal diet      Special Instructions: None    · Is patient discharged on Warfarin / Coumadin?   No     Depression / Suicide Risk    As you are discharged from this [facility] facility, it is important to learn how to keep safe from harming yourself.    Recognize the warning signs:  · Abrupt changes in personality, positive or negative- including increase in energy   · Giving away possessions  · Change in eating patterns- significant weight changes-  positive or negative  · Change in sleeping patterns- unable to sleep or sleeping all the time   · Unwillingness or inability to communicate  · Depression  · Unusual sadness, discouragement and loneliness  · Talk of wanting to die  · Neglect of personal appearance   · Rebelliousness- reckless behavior  · Withdrawal from people/activities they love  · Confusion- inability to concentrate     If you or a loved one observes any of these behaviors or has concerns about self-harm, here's what you can do:  · Talk about it- your feelings and reasons for harming yourself  · Remove any means that you might use to hurt yourself  (examples: pills, rope, extension cords, firearm)  · Get professional help from the community (Mental Health, Substance Abuse, psychological counseling)  · Do not be alone:Call your Safe Contact- someone whom you trust who will be there for you.  · Call your local CRISIS HOTLINE 218-3268 or 471-804-3282  · Call your local Children's Mobile Crisis Response Team [place] (232) 355-9640 or www.[employer] [facility]  · Call the toll free National Suicide Prevention Hotlines   · National Suicide Prevention Lifeline 674-865-QUKE (0438)  · National Hope Line Network 800-SUICIDE (558-7928)      Aripiprazole tablets  What is this medicine?  ARIPIPRAZOLE (ay ri PIP ray zole) is an atypical antipsychotic. It is used to treat schizophrenia and bipolar disorder, also known as manic-depression. It is also used to treat Tourette's disorder and some symptoms of autism. This medicine may also be used in combination with antidepressants to treat major depressive disorder.  This medicine may be used for other purposes; ask your health care provider or pharmacist if you have questions.  COMMON BRAND NAME(S): Abilify  What should I tell my health care provider before I take this medicine?  They need to know if you have any of these conditions:  -dehydration  -dementia  -diabetes  -heart disease  -history of stroke  -low blood counts, like low white cell, platelet, or red cell counts  -Parkinson's disease  -seizures  -suicidal thoughts, plans, or attempt; a previous suicide attempt by you or a family member  -an unusual or allergic reaction to aripiprazole, other medicines, foods, dyes, or preservatives  -pregnant or trying to get pregnant  -breast-feeding  How should I use this medicine?  Take this medicine by mouth with a glass of water. Follow the directions on the prescription label. You can take this medicine with or without food. Take your doses at regular intervals. Do not take your medicine more often than directed. Do not stop  taking except on the advice of your doctor or health care professional.  A special MedGuide will be given to you by the pharmacist with each prescription and refill. Be sure to read this information carefully each time.  Talk to your pediatrician regarding the use of this medicine in children. While this drug may be prescribed for children as young as 6 years of age for selected conditions, precautions do apply.  Overdosage: If you think you have taken too much of this medicine contact a poison control center or emergency room at once.  NOTE: This medicine is only for you. Do not share this medicine with others.  What if I miss a dose?  If you miss a dose, take it as soon as you can. If it is almost time for your next dose, take only that dose. Do not take double or extra doses.  What may interact with this medicine?  Do not take this medicine with any of the following medications:  -certain medicines for fungal infections like fluconazole, itraconazole, ketoconazole, posaconazole, voriconazole  -cisapride  -dofetilide  -dronedarone  -pimozide  -thioridazine  -ziprasidone  This medicine may also interact with the following medications:  -carbamazepine  -certain medicines for blood pressure  -erythromycin  -fluoxetine  -grapefruit juice  -other medicines that prolong the QT interval (cause an abnormal heart rhythm)  -paroxetine  -quinidine  -valproic acid  This list may not describe all possible interactions. Give your health care provider a list of all the medicines, herbs, non-prescription drugs, or dietary supplements you use. Also tell them if you smoke, drink alcohol, or use illegal drugs. Some items may interact with your medicine.  What should I watch for while using this medicine?  Visit your doctor or health care professional for regular checks on your progress. It may be several weeks before you see the full effects of this medicine. Do not suddenly stop taking this medicine. You may need to gradually  reduce the dose.  Patients and their families should watch out for worsening depression or thoughts of suicide. Also watch out for sudden changes in feelings such as feeling anxious, agitated, panicky, irritable, hostile, aggressive, impulsive, severely restless, overly excited and hyperactive, or not being able to sleep. If this happens, especially at the beginning of antidepressant treatment or after a change in dose, call your health care professional.  You may get dizzy or drowsy. Do not drive, use machinery, or do anything that needs mental alertness until you know how this medicine affects you. Do not stand or sit up quickly, especially if you are an older patient. This reduces the risk of dizzy or fainting spells. Alcohol can increase dizziness and drowsiness. Avoid alcoholic drinks.  This medicine can reduce the response of your body to heat or cold. Dress warm in cold weather and stay hydrated in hot weather. If possible, avoid extreme temperatures like saunas, hot tubs, very hot or cold showers, or activities that can cause dehydration such as vigorous exercise.  This medicine may cause dry eyes and blurred vision. If you wear contact lenses you may feel some discomfort. Lubricating drops may help. See your eye doctor if the problem does not go away or is severe.  If you notice an increased hunger or thirst, different from your normal hunger or thirst, or if you find that you have to urinate more frequently, you should contact your health care provider as soon as possible. You may need to have your blood sugar monitored. This medicine may cause changes in your blood sugar levels. You should monitor you blood sugar frequently if you have diabetes.  There have been reports of uncontrollable and strong urges to menjivar, binge eat, shop, and have sex while taking this medicine. If you experience any of these or other uncontrollable and strong urges while taking this medicine, you should report it to your health  care provider as soon as possible.  What side effects may I notice from receiving this medicine?  Side effects that you should report to your doctor or health care professional as soon as possible:  -allergic reactions like skin rash, itching or hives, swelling of the face, lips, or tongue  -breathing problems  -confusion  -feeling faint or lightheaded, falls  -fever or chills, sore throat  -increased hunger or thirst  -increased urination  -joint pain  -muscles pain, spasms  -problems with balance, talking, walking  -restlessness or need to keep moving  -seizures  -suicidal thoughts or other mood changes  -trouble swallowing  -uncontrollable and excessive urges (examples: gambling, binge eating, shopping, having sex)  -uncontrollable head, mouth, neck, arm, or leg movements  -unusually weak or tired  Side effects that usually do not require medical attention (report to your doctor or health care professional if they continue or are bothersome):  -blurred vision  -constipation  -headache  -nausea, vomiting  -trouble sleeping  -weight gain  This list may not describe all possible side effects. Call your doctor for medical advice about side effects. You may report side effects to FDA at 2-088-FDA-6363.  Where should I keep my medicine?  Keep out of the reach of children.  Store at room temperature between 15 and 30 degrees C (59 and 86 degrees F). Throw away any unused medicine after the expiration date.  NOTE: This sheet is a summary. It may not cover all possible information. If you have questions about this medicine, talk to your doctor, pharmacist, or health care provider.  © 2018 Elsevier/Gold Standard (2016-05-05 15:37:42)      Amlodipine tablets  What is this medicine?  AMLODIPINE (am MEÑO di peen) is a calcium-channel blocker. It affects the amount of calcium found in your heart and muscle cells. This relaxes your blood vessels, which can reduce the amount of work the heart has to do. This medicine is used to  lower high blood pressure. It is also used to prevent chest pain.  This medicine may be used for other purposes; ask your health care provider or pharmacist if you have questions.  COMMON BRAND NAME(S): Norvasc  What should I tell my health care provider before I take this medicine?  They need to know if you have any of these conditions:  -heart problems like heart failure or aortic stenosis  -liver disease  -an unusual or allergic reaction to amlodipine, other medicines, foods, dyes, or preservatives  -pregnant or trying to get pregnant  -breast-feeding  How should I use this medicine?  Take this medicine by mouth with a glass of water. Follow the directions on the prescription label. Take your medicine at regular intervals. Do not take more medicine than directed.  Talk to your pediatrician regarding the use of this medicine in children. Special care may be needed. This medicine has been used in children as young as 6.  Persons over 65 years old may have a stronger reaction to this medicine and need smaller doses.  Overdosage: If you think you have taken too much of this medicine contact a poison control center or emergency room at once.  NOTE: This medicine is only for you. Do not share this medicine with others.  What if I miss a dose?  If you miss a dose, take it as soon as you can. If it is almost time for your next dose, take only that dose. Do not take double or extra doses.  What may interact with this medicine?  -herbal or dietary supplements  -local or general anesthetics  -medicines for high blood pressure  -medicines for prostate problems  -rifampin  This list may not describe all possible interactions. Give your health care provider a list of all the medicines, herbs, non-prescription drugs, or dietary supplements you use. Also tell them if you smoke, drink alcohol, or use illegal drugs. Some items may interact with your medicine.  What should I watch for while using this medicine?  Visit your doctor  or health care professional for regular check ups. Check your blood pressure and pulse rate regularly. Ask your health care professional what your blood pressure and pulse rate should be, and when you should contact him or her.  This medicine may make you feel confused, dizzy or lightheaded. Do not drive, use machinery, or do anything that needs mental alertness until you know how this medicine affects you. To reduce the risk of dizzy or fainting spells, do not sit or stand up quickly, especially if you are an older patient. Avoid alcoholic drinks; they can make you more dizzy.  Do not suddenly stop taking amlodipine. Ask your doctor or health care professional how you can gradually reduce the dose.  What side effects may I notice from receiving this medicine?  Side effects that you should report to your doctor or health care professional as soon as possible:  -allergic reactions like skin rash, itching or hives, swelling of the face, lips, or tongue  -breathing problems  -changes in vision or hearing  -chest pain  -fast, irregular heartbeat  -swelling of legs or ankles  Side effects that usually do not require medical attention (report to your doctor or health care professional if they continue or are bothersome):  -dry mouth  -facial flushing  -nausea, vomiting  -stomach gas, pain  -tired, weak  -trouble sleeping  This list may not describe all possible side effects. Call your doctor for medical advice about side effects. You may report side effects to FDA at 8-327-FDA-0102.  Where should I keep my medicine?  Keep out of the reach of children.  Store at room temperature between 59 and 86 degrees F (15 and 30 degrees C). Protect from light. Keep container tightly closed. Throw away any unused medicine after the expiration date.  NOTE: This sheet is a summary. It may not cover all possible information. If you have questions about this medicine, talk to your doctor, pharmacist, or health care provider.  © 2018  Elsevier/Gold Standard (2013-11-15 11:40:58)

## 2019-01-10 NOTE — PROGRESS NOTES
Pt discharged with her grandmother    Port deacessed using the SASH method. Pt tolerated well. Dressing in place    Discharge instructions discussed and all questions answered

## 2019-01-17 ENCOUNTER — OFFICE VISIT (OUTPATIENT)
Dept: PHYSICAL MEDICINE AND REHAB | Facility: MEDICAL CENTER | Age: 39
End: 2019-01-17
Payer: MEDICARE

## 2019-01-17 VITALS
SYSTOLIC BLOOD PRESSURE: 100 MMHG | OXYGEN SATURATION: 95 % | HEIGHT: 65 IN | TEMPERATURE: 98.4 F | DIASTOLIC BLOOD PRESSURE: 60 MMHG | BODY MASS INDEX: 27.51 KG/M2 | WEIGHT: 165.12 LBS | HEART RATE: 100 BPM

## 2019-01-17 DIAGNOSIS — K59.03 THERAPEUTIC OPIOID INDUCED CONSTIPATION: ICD-10-CM

## 2019-01-17 DIAGNOSIS — G89.29 CHRONIC BILATERAL LOW BACK PAIN WITHOUT SCIATICA: ICD-10-CM

## 2019-01-17 DIAGNOSIS — G62.9 PERIPHERAL POLYNEUROPATHY: ICD-10-CM

## 2019-01-17 DIAGNOSIS — M87.051 AVASCULAR NECROSIS OF BONES OF BOTH HIPS (HCC): ICD-10-CM

## 2019-01-17 DIAGNOSIS — M79.7 FIBROMYALGIA: ICD-10-CM

## 2019-01-17 DIAGNOSIS — M54.50 CHRONIC BILATERAL LOW BACK PAIN WITHOUT SCIATICA: ICD-10-CM

## 2019-01-17 DIAGNOSIS — F11.90 CHRONIC, CONTINUOUS USE OF OPIOIDS: ICD-10-CM

## 2019-01-17 DIAGNOSIS — M87.052 AVASCULAR NECROSIS OF BONES OF BOTH HIPS (HCC): ICD-10-CM

## 2019-01-17 DIAGNOSIS — T40.2X5A THERAPEUTIC OPIOID INDUCED CONSTIPATION: ICD-10-CM

## 2019-01-17 PROCEDURE — 99215 OFFICE O/P EST HI 40 MIN: CPT | Performed by: PHYSICAL MEDICINE & REHABILITATION

## 2019-01-17 RX ORDER — OXYCODONE HYDROCHLORIDE 20 MG/1
20 TABLET ORAL EVERY 6 HOURS PRN
Qty: 96 TAB | Refills: 0 | Status: SHIPPED | OUTPATIENT
Start: 2019-01-17 | End: 2019-02-14 | Stop reason: SDUPTHER

## 2019-01-17 RX ORDER — PREGABALIN 75 MG/1
75 CAPSULE ORAL 3 TIMES DAILY
Qty: 84 CAP | Refills: 0 | Status: SHIPPED | OUTPATIENT
Start: 2019-01-17 | End: 2019-02-14 | Stop reason: SDUPTHER

## 2019-01-17 ASSESSMENT — PATIENT HEALTH QUESTIONNAIRE - PHQ9: CLINICAL INTERPRETATION OF PHQ2 SCORE: 0

## 2019-01-17 ASSESSMENT — PAIN SCALES - GENERAL: PAINLEVEL: 8=MODERATE-SEVERE PAIN

## 2019-01-21 ENCOUNTER — APPOINTMENT (OUTPATIENT)
Dept: RADIOLOGY | Facility: MEDICAL CENTER | Age: 39
End: 2019-01-21
Attending: EMERGENCY MEDICINE
Payer: MEDICARE

## 2019-01-21 ENCOUNTER — HOSPITAL ENCOUNTER (EMERGENCY)
Facility: MEDICAL CENTER | Age: 39
End: 2019-01-21
Attending: EMERGENCY MEDICINE
Payer: MEDICARE

## 2019-01-21 VITALS
OXYGEN SATURATION: 98 % | HEIGHT: 65 IN | DIASTOLIC BLOOD PRESSURE: 80 MMHG | RESPIRATION RATE: 16 BRPM | HEART RATE: 89 BPM | TEMPERATURE: 99.1 F | WEIGHT: 165.34 LBS | BODY MASS INDEX: 27.55 KG/M2 | SYSTOLIC BLOOD PRESSURE: 123 MMHG

## 2019-01-21 DIAGNOSIS — S92.355A CLOSED NONDISPLACED FRACTURE OF FIFTH METATARSAL BONE OF LEFT FOOT, INITIAL ENCOUNTER: ICD-10-CM

## 2019-01-21 PROCEDURE — 99284 EMERGENCY DEPT VISIT MOD MDM: CPT

## 2019-01-21 PROCEDURE — 73630 X-RAY EXAM OF FOOT: CPT | Mod: LT

## 2019-01-21 PROCEDURE — 700102 HCHG RX REV CODE 250 W/ 637 OVERRIDE(OP): Performed by: EMERGENCY MEDICINE

## 2019-01-21 PROCEDURE — A9270 NON-COVERED ITEM OR SERVICE: HCPCS | Performed by: EMERGENCY MEDICINE

## 2019-01-21 RX ORDER — OXYCODONE HYDROCHLORIDE AND ACETAMINOPHEN 5; 325 MG/1; MG/1
2 TABLET ORAL ONCE
Status: COMPLETED | OUTPATIENT
Start: 2019-01-21 | End: 2019-01-21

## 2019-01-21 RX ADMIN — OXYCODONE AND ACETAMINOPHEN 2 TABLET: 5; 325 TABLET ORAL at 08:35

## 2019-01-21 ASSESSMENT — PAIN SCALES - GENERAL
PAINLEVEL_OUTOF10: 8
PAINLEVEL_OUTOF10: 3

## 2019-01-21 ASSESSMENT — LIFESTYLE VARIABLES: DO YOU DRINK ALCOHOL: NO

## 2019-01-21 NOTE — DISCHARGE INSTRUCTIONS
Wear postop shoe.  Ice the foot frequently for 15 minutes at a time over the next day and a half.  Take Tylenol or your Percocet and ibuprofen or Aleve for pain unless the NSAIDs upset the stomach.  Use crutches while helpful.  Follow-up with Orth O in the week.    You had a borderline or high normal blood pressure reading today.  This does not necessarily mean you have hypertension.  Please followup with your/a primary physician for comprehensive blood pressure evaluation and yearly fasting cholesterol assessment.    122/85

## 2019-01-21 NOTE — ED PROVIDER NOTES
ED Provider Note    Scribed for Daniel Daugherty M.D. by Bora Cobian. 1/21/2019  8:26 AM    Primary care provider: Sushila Manzano M.D.  Means of arrival: Walk-in  History obtained from: Patient  History limited by: None    CHIEF COMPLAINT  Chief Complaint   Patient presents with   • Foot Pain     left       HPI  Debby Carrizales is a 38 y.o. female who presents to the Emergency Department for evaluation of a left foot injury that occurred last night. Per patient, she has a history of sleep walking and last night she while she was sleep walking she had a ground level fall. She explains that while she was sleeping her left leg had become numb and when she started sleep walking she accidentally rolled her left ankle inwards. After rolling this ankle she woke up immediately and reports that she was experiencing immediate pain. The patient since has been experiencing 7/10 pain when she is not putting pressure on the foot and admits that pressure has been an exacerbating factor. The patient reports that most of her pain has been localized to the outside of her left foot as well. She does not have a history of left foot injures and she denies any chance of pregnancy. She has a history of lupus, fibromyalgia, and she is a dialysis patient. She denies any other injures.     REVIEW OF SYSTEMS  Pertinent positives include: left foot pain.  Pertinent negatives include: other injuries.  See HPI for further details.      PAST MEDICAL HISTORY  Past Medical History:   Diagnosis Date   • Arthritis     all joints,r/t lupus   • Avascular necrosis of bones of both hips (HCC) 10/10/2016   • Clostridium difficile colitis 5/3/2011   • Dialysis patient    • ESBL (extended spectrum beta-lactamase) producing bacteria infection 8/25/2014   • Fibromyalgia    • Hypertension    • Lupus    • Pneumonia    • Psychiatric disorder     anxiety, depression   • Pyelonephritis 11/21/2017   • Renal failure        FAMILY HISTORY  Family  History   Problem Relation Age of Onset   • Heart Disease Mother    • Cancer Father        SOCIAL HISTORY  Social History   Substance Use Topics   • Smoking status: Former Smoker     Packs/day: 0.50     Years: 18.00     Types: Cigarettes     Quit date: 6/13/2011   • Smokeless tobacco: Never Used      Comment: 1/2 ppd   • Alcohol use No     History   Drug Use No       SURGICAL HISTORY  Past Surgical History:   Procedure Laterality Date   • AV FISTULA REVISION Right 8/11/2018    Procedure: AV FISTULA REVISION- Graft and Thrombectomy;  Surgeon: Shabbir Ardon M.D.;  Location: [facility];  Service: General   • AV FISTULA THROMBOLYSIS Right 8/11/2018    Procedure: AV FISTULA THROMBOLYSIS;  Surgeon: Shabbir Ardon M.D.;  Location: [facility];  Service: General   • AV FISTULA CREATION Right 12/9/2017    Procedure: AV FISTULA CREATION-ARM FOR GRAFT;  Surgeon: Lesly Jansen M.D.;  Location: [facility];  Service: General   • THROMBECTOMY  12/9/2017    Procedure: THROMBECTOMY;  Surgeon: Lesly Jansen M.D.;  Location: [facility];  Service: General   • THROMBECTOMY Right 8/21/2016    Procedure: THROMBECTOMY - right AV fistula graft with grams;  Surgeon: Shabbir Ardon M.D.;  Location: [facility];  Service:    • ANGIOPLASTY BALLOON  8/21/2016    Procedure: ANGIOPLASTY BALLOON;  Surgeon: Shabbir Ardon M.D.;  Location: [facility];  Service:    • THROMBECTOMY Right 8/20/2016    Procedure: THROMBECTOMY AV GRAFT;  Surgeon: Shabbir Ardon M.D.;  Location: [facility];  Service:    • AV FISTULA CREATION Right 7/12/2016    Procedure: AV FISTULA CREATION WITH GRAFT BRACHIAL AXILLARY;  Surgeon: Shabbir Ardon M.D.;  Location: SURGERY [facility];  Service:    • CLOSED REDUCTION Right 7/5/2016    Procedure: CLOSED REDUCTION- Hip ;  Surgeon: Michael Holman M.D.;  Location: [facility];  Service:    • HIP  ARTHROPLASTY TOTAL Right 1/18/2016    Procedure: HIP ARTHROPLASTY TOTAL;  Surgeon: Michael Holman M.D.;  Location: SUR[facility]ERY [facility];  Service:    • AV FISTULA CREATION  2/3/2015    Performed by Shabbir Ardon M.D. at SURGERY [facility]   • AV FISTULA CREATION  11/14/2014    Performed by Shabbir Ardon M.D. at SURGERY [facility]   • AV FISTULA CREATION  9/9/2014    Performed by Shabbir Ardon M.D. at SURGERY [facility]   • CATH PLACEMENT  9/9/2014    Performed by Shabbir Ardon M.D. at SURGERY [facility]   • OTHER  5/2011    tracheostomy   • GASTROSCOPY-ENDO  4/27/2011    Performed by PALMIRA DOAN at ENDOSCOPY [facility]   • COLONOSCOPY WITH BIOPSY  4/20/2011    Performed by ALCIRA CRUZ at ENDOSCOPY [facility]   • GASTROSCOPY-ENDO  4/18/2011    Performed by ALCIRA CRUZ at ENDOSCOPY [facility]   • GASTROSCOPY-ENDO  4/10/2011    Performed by SYED JURADO at ENDOSCOPY [facility]   • SCLEROTHERAPHY  4/10/2011    Performed by SYED JURADO at ENDOSCOPY [facility]   • OTHER ABDOMINAL SURGERY      kidney biopsy   • TRACHEOSTOMY         CURRENT MEDICATIONS  Home Medications     Reviewed by Erinn Martines R.N. (Registered Nurse) on 01/21/19 at 0800  Med List Status: Partial   Medication Last Dose Status   amLODIPine (NORVASC) 10 MG Tab  Active   ARIPiprazole (ABILIFY) 5 MG tablet  Active   ascorbic acid (ASCORBIC ACID) 500 MG Tab  Active   cholecalciferol (VITAMIN D3) 5000 UNIT Cap  Active   ferrous sulfate 325 (65 FE) MG tablet  Active   hydroxychloroquine (PLAQUENIL) 200 MG Tab  Active   lidocaine (LIDODERM) 5 % Patch  Active   omeprazole (PRILOSEC) 20 MG delayed-release capsule  Active   Oxycodone HCl 20 MG Tab  Active   pregabalin (LYRICA) 75 MG Cap  Active   promethazine (PHENERGAN) 25 MG Tab  Active   sevelamer carbonate (RENVELA) 800 MG Tab tablet  Active   tizanidine (ZANAFLEX) 4 MG Tab  Active   trazodone (DESYREL)  "50 MG Tab  Active                ALLERGIES  Allergies   Allergen Reactions   • Lorazepam [Ativan] Anxiety     Patient becomes severely paranoid and agitated   • Morphine Itching     Tolerates Dilaudid   • Seasonal Runny Nose and Itching     Hay fever, sabiha brush       PHYSICAL EXAM  VITAL SIGNS: /87   Pulse (!) 120   Temp 37.3 °C (99.1 °F) (Temporal)   Resp 18   Ht 1.651 m (5' 5\")   Wt 75 kg (165 lb 5.5 oz)   SpO2 98%   BMI 27.51 kg/m²   Reviewed and tachycardic, elevated blood pressure  Constitutional: Well developed, Well nourished, no acute distress.  HENT: Normocephalic, atraumatic,  Skin: No erythema, no rash.   Musculoskeletal: talofibular ligament tenderness. Tenderness throughout lateral left foot.  No proximal leg tenderness.  No malleoli or tenderness.  Neurologic: Alert & oriented x 3, cranial nerves 2-12 intact by passive exam.  No focal deficit noted.  Psychiatric: Affect normal, Judgment normal, Mood normal.     DIFFERENTIAL DIAGNOSIS:  Ankle sprain, ankle fracture, fifth metatarsal fracture, foot sprain.    RADIOLOGY/PROCEDURES  DX-FOOT-COMPLETE 3+ LEFT   Final Result      Nondisplaced transverse fracture through the base of the fifth metatarsal.        Radiologist interpretation have been reviewed by me.     INTERVENTIONS:  Medications   oxyCODONE-acetaminophen (PERCOCET) 5-325 MG per tablet 2 Tab (2 Tabs Oral Given 1/21/19 0876)       ED COURSE:  Nursing notes, VS, PMSFHx reviewed in chart.     8:26 AM - Patient seen and examined at bedside. Patient will be treated with 2 tablets of 5-325 mg Percocet for her symptoms. Ordered DX-foot complete 3+ left to evaluate.     9:29 AM - Patient was reevaluated at bedside. Discussed radiology results with the patient and informed them that she has a nondisplaced fifth metatarsal base fracture. Patient was given further plans of care including following up with an orthopedic surgeon. She was advised to stay off this foot and use crutches as needed. " The patient understands and verbalizes agreement with the plan of care.     MEDICAL DECISION MAKING:  Well-appearing patient presents with a nondisplaced left fifth metatarsal base fracture after an inversion injury.  She is on chronic oxycodone for chronic pain.    Foot fracture handout given  Ibuprofen, Tylenol, rice  Postop shoe  Crutches as needed which patient already has  Oxycodone which patient has as needed for pain    Eddie Garcia M.D.  [address]  [address]  [facility] [address]  [phone]    Schedule an appointment as soon as possible for a visit in 1 week        CONDITION: Good.     FINAL IMPRESSION  1. Closed nondisplaced fracture of fifth metatarsal bone of left foot, initial encounter          Bora BARGER (Scribe), am scribing for, and in the presence of, Daniel Daugherty M.D..    Electronically signed by: Bora Cobian (Scribe), 1/21/2019    IDaniel M.D. personally performed the services described in this documentation, as scribed by Bora Cobian in my presence, and it is both accurate and complete. E.     The note accurately reflects work and decisions made by me.  Daniel Daugherty  1/21/2019  9:55 AM

## 2019-01-21 NOTE — ED TRIAGE NOTES
Chief Complaint   Patient presents with   • Foot Pain     left     Pt wheeled to triage, here for top of left foot pain after rolling it when she stood up this morning .

## 2019-01-28 NOTE — ADDENDUM NOTE
Encounter addended by: Kacie Mccloud M.D. on: 1/28/2019 11:19 AM<BR>    Actions taken: Sign clinical note

## 2019-02-01 ENCOUNTER — OFFICE VISIT (OUTPATIENT)
Dept: MEDICAL GROUP | Facility: MEDICAL CENTER | Age: 39
End: 2019-02-01
Payer: MEDICARE

## 2019-02-01 VITALS
HEIGHT: 65 IN | TEMPERATURE: 98.6 F | OXYGEN SATURATION: 98 % | BODY MASS INDEX: 29.32 KG/M2 | WEIGHT: 176 LBS | SYSTOLIC BLOOD PRESSURE: 142 MMHG | HEART RATE: 104 BPM | DIASTOLIC BLOOD PRESSURE: 82 MMHG | RESPIRATION RATE: 16 BRPM

## 2019-02-01 DIAGNOSIS — M32.19 SYSTEMIC LUPUS ERYTHEMATOSUS WITH OTHER ORGAN INVOLVEMENT, UNSPECIFIED SLE TYPE (HCC): ICD-10-CM

## 2019-02-01 DIAGNOSIS — M79.7 FIBROMYALGIA: ICD-10-CM

## 2019-02-01 DIAGNOSIS — D63.1 ANEMIA IN CHRONIC KIDNEY DISEASE, ON CHRONIC DIALYSIS (HCC): ICD-10-CM

## 2019-02-01 DIAGNOSIS — M87.052 AVASCULAR NECROSIS OF BONES OF BOTH HIPS (HCC): ICD-10-CM

## 2019-02-01 DIAGNOSIS — N18.6 ESRD (END STAGE RENAL DISEASE) ON DIALYSIS (HCC): ICD-10-CM

## 2019-02-01 DIAGNOSIS — F33.1 MODERATE EPISODE OF RECURRENT MAJOR DEPRESSIVE DISORDER (HCC): ICD-10-CM

## 2019-02-01 DIAGNOSIS — S62.347A CLOSED NONDISPLACED FRACTURE OF BASE OF FIFTH METACARPAL BONE OF LEFT HAND, INITIAL ENCOUNTER: ICD-10-CM

## 2019-02-01 DIAGNOSIS — N18.6 ANEMIA IN CHRONIC KIDNEY DISEASE, ON CHRONIC DIALYSIS (HCC): ICD-10-CM

## 2019-02-01 DIAGNOSIS — I77.0 A-V FISTULA (HCC): ICD-10-CM

## 2019-02-01 DIAGNOSIS — M87.051 AVASCULAR NECROSIS OF BONES OF BOTH HIPS (HCC): ICD-10-CM

## 2019-02-01 DIAGNOSIS — G40.909 SEIZURE DISORDER (HCC): ICD-10-CM

## 2019-02-01 DIAGNOSIS — M32.14: ICD-10-CM

## 2019-02-01 DIAGNOSIS — F11.20 UNCOMPLICATED OPIOID DEPENDENCE (HCC): ICD-10-CM

## 2019-02-01 DIAGNOSIS — Z99.2 ANEMIA IN CHRONIC KIDNEY DISEASE, ON CHRONIC DIALYSIS (HCC): ICD-10-CM

## 2019-02-01 DIAGNOSIS — Z99.2 ESRD (END STAGE RENAL DISEASE) ON DIALYSIS (HCC): ICD-10-CM

## 2019-02-01 PROBLEM — D64.9 ANEMIA: Status: RESOLVED | Noted: 2017-07-19 | Resolved: 2019-02-01

## 2019-02-01 PROBLEM — A41.9 SEPSIS DUE TO PNEUMONIA (HCC): Status: RESOLVED | Noted: 2018-06-07 | Resolved: 2019-02-01

## 2019-02-01 PROBLEM — J18.9 SEPSIS DUE TO PNEUMONIA (HCC): Status: RESOLVED | Noted: 2018-06-07 | Resolved: 2019-02-01

## 2019-02-01 PROBLEM — Z91.199 MEDICAL NON-COMPLIANCE: Status: RESOLVED | Noted: 2017-01-04 | Resolved: 2019-02-01

## 2019-02-01 PROBLEM — E87.20 ACIDOSIS: Status: RESOLVED | Noted: 2018-06-07 | Resolved: 2019-02-01

## 2019-02-01 PROBLEM — E66.9 OBESITY (BMI 30-39.9): Status: RESOLVED | Noted: 2018-02-09 | Resolved: 2019-02-01

## 2019-02-01 PROBLEM — G89.4 CHRONIC PAIN DISORDER: Status: RESOLVED | Noted: 2018-08-12 | Resolved: 2019-02-01

## 2019-02-01 PROBLEM — F32.A DEPRESSION: Status: RESOLVED | Noted: 2017-07-25 | Resolved: 2019-02-01

## 2019-02-01 PROBLEM — G40.901 STATUS EPILEPTICUS (HCC): Status: RESOLVED | Noted: 2018-12-13 | Resolved: 2019-02-01

## 2019-02-01 PROBLEM — M87.00 AVASCULAR NECROSIS (HCC): Status: RESOLVED | Noted: 2017-11-21 | Resolved: 2019-02-01

## 2019-02-01 PROBLEM — R62.7 FAILURE TO THRIVE IN ADULT: Status: RESOLVED | Noted: 2019-01-01 | Resolved: 2019-02-01

## 2019-02-01 PROCEDURE — 99214 OFFICE O/P EST MOD 30 MIN: CPT | Performed by: FAMILY MEDICINE

## 2019-02-01 RX ORDER — LACOSAMIDE 100 MG/1
100 TABLET ORAL 2 TIMES DAILY
COMMUNITY
End: 2019-02-01 | Stop reason: SDUPTHER

## 2019-02-01 RX ORDER — LACOSAMIDE 100 MG/1
100 TABLET ORAL 2 TIMES DAILY
Qty: 180 TAB | Refills: 0 | Status: SHIPPED | OUTPATIENT
Start: 2019-02-01 | End: 2019-03-04 | Stop reason: SDUPTHER

## 2019-02-01 NOTE — PROGRESS NOTES
cc:  seizure    Subjective:     Debby Carrizales is a 38 y.o. female presenting for:      1.  Seizure disorder: Was diagnosed with a seizure disorder when persistent lethargy and change in mental status despite receiving dialysis during the hospitalization.  EEG showed status epilepticus.  She was started on Keppra and Vimpat.  However, the Keppra was recently discontinued as she developed significant lower extremity weakness.  she continues on the Vimpat 100 mg twice a day.  She has an appointment with neurology in March.  Denies any more recurrence of seizures.    2.  Depression: She was on Wellbutrin in the past for depression, had been stable on it for years.  During her hospitalization, Wellbutrin was discontinued due to seizure concerns.  She was started on Abilify at her hospitalization several weeks ago due to concerns of worsening depression, noncompliance with dialysis.  She reports feeling highly paranoid and suggestible while on the Abilify.  She has stopped it due to side effects and because it was too expensive.  She currently feels well, does not feel overwhelmed or too depressed.  However, she would like to see a psychiatrist as she is concerned that she may need a medication in the future.  Given her multiple medical issues, would prefer to see a psychiatrist first.    3.  Foot fracture: She was recently in the ER after falling and fracturing her fifth left metatarsal.  Has not followed up with orthopedics yet, but plans to do so soon.    4.  Dialysis, ESRD: Continues to go to dialysis and sees nephrology.  Has a graft on her right arm for access.  Takes amlodipine 10 mg as needed for her blood pressures.  on renvela, vitamin D, iron    5.  Chronic pain: She now sees pain management/physiatry for her chronic pain from her avascular necrosis of the hips, fibromyalgia.  Lyrica was initially discontinued during 1 of her hospitalizations, but she has restarted this.  Her neuropathy feels well  controlled currently.  She is also on tizanidine, oxycodone    6.  Lupus: Continues to see rheumatology for her lupus.  Is currently on Plaquenil.  She declines any vaccinations today      Review of systems:  See above.       Current Outpatient Prescriptions:   •  lacosamide (VIMPAT) 100 MG Tab tablet, Take 1 Tab by mouth 2 Times a Day for 90 days., Disp: 180 Tab, Rfl: 0  •  Oxycodone HCl 20 MG Tab, Take 1 Tab by mouth every 6 hours as needed (Take up to three a day as needed and one additional pill after dialysis (3 times a week)) for up to 28 days., Disp: 96 Tab, Rfl: 0  •  pregabalin (LYRICA) 75 MG Cap, Take 1 Cap by mouth 3 times a day for 28 days., Disp: 84 Cap, Rfl: 0  •  amLODIPine (NORVASC) 10 MG Tab, Take 1 Tab by mouth every day., Disp: 30 Tab, Rfl: 0  •  promethazine (PHENERGAN) 25 MG Tab, Take 25 mg by mouth every 8 hours as needed for Nausea/Vomiting., Disp: , Rfl:   •  lidocaine (LIDODERM) 5 % Patch, Apply 1 Patch to skin as directed every 24 hours., Disp: 10 Patch, Rfl: 1  •  omeprazole (PRILOSEC) 20 MG delayed-release capsule, Take 1 Cap by mouth every day., Disp: 30 Cap, Rfl: 3  •  sevelamer carbonate (RENVELA) 800 MG Tab tablet, Take 2,400 mg by mouth 3 times a day, with meals., Disp: , Rfl:   •  ferrous sulfate 325 (65 FE) MG tablet, Take 325 mg by mouth every day., Disp: , Rfl:   •  ascorbic acid (ASCORBIC ACID) 500 MG Tab, Take 500 mg by mouth every day., Disp: , Rfl:   •  hydroxychloroquine (PLAQUENIL) 200 MG Tab, Take 200 mg by mouth every bedtime., Disp: , Rfl:   •  cholecalciferol (VITAMIN D3) 5000 UNIT Cap, Take 10,000 Units by mouth every day., Disp: , Rfl:   •  trazodone (DESYREL) 50 MG Tab, Take 1 Tab by mouth every bedtime., Disp: 30 Tab, Rfl: 0  •  tizanidine (ZANAFLEX) 4 MG Tab, Take 2 Tabs by mouth every bedtime., Disp: 60 Tab, Rfl: 0    Allergies, past medical history, past surgical history, family history, social history reviewed and updated    Objective:     Vitals: /82 (BP  "Location: Right arm, Patient Position: Sitting)   Pulse (!) 104   Temp 37 °C (98.6 °F) (Temporal)   Resp 16   Ht 1.651 m (5' 5\")   Wt 79.8 kg (176 lb)   LMP 01/18/2019   SpO2 98%   BMI 29.29 kg/m²   General: Alert, pleasant, NAD  HEENT: Normocephalic  Psych:  Affect/mood is normal, judgement is good, memory is intact, grooming is appropriate.    Assessment/Plan:     Diagnoses and all orders for this visit:    Seizure disorder (HCC)  Stable, has an appointment with neurology in March.  Will give refills of her Vimpat for now.  -     lacosamide (VIMPAT) 100 MG Tab tablet; Take 1 Tab by mouth 2 Times a Day for 90 days.  -     REFERRAL TO PSYCHIATRY    Moderate episode of recurrent major depressive disorder (HCC)  Stable, currently off medications.  Referral to psychiatry placed  -     REFERRAL TO PSYCHIATRY    Closed nondisplaced fracture of base of fifth metacarpal bone of left hand, initial encounter  Stable, currently in a boot.  Referral to Orth O placed  -     REFERRAL TO ORTHOPEDICS    Systemic lupus erythematosus with other organ involvement, unspecified SLE type (HCC)  Stable, managed by rheumatology  -     REFERRAL TO PSYCHIATRY    ESRD (end stage renal disease) on dialysis (HCC)  A-V fistula (HCC)  Anemia in chronic kidney disease, on chronic dialysis (HCC)  Chronic lupus nephritis (HCC)  Stable, managed by nephrology  -     REFERRAL TO PSYCHIATRY    Uncomplicated opioid dependence (HCC)  Fibromyalgia  Avascular necrosis of bones of both hips (HCC)  Stable, managed by physiatry  -     REFERRAL TO PSYCHIATRY        Return in about 4 months (around 6/1/2019) for routine follow up.    "

## 2019-02-08 ENCOUNTER — PATIENT OUTREACH (OUTPATIENT)
Dept: HEALTH INFORMATION MANAGEMENT | Facility: OTHER | Age: 39
End: 2019-02-08

## 2019-02-13 NOTE — ADDENDUM NOTE
Encounter addended by: Zenaida Leonardo M.D. on: 2/13/2019 12:42 PM<BR>    Actions taken: Sign clinical note

## 2019-02-14 ENCOUNTER — OFFICE VISIT (OUTPATIENT)
Dept: PHYSICAL MEDICINE AND REHAB | Facility: MEDICAL CENTER | Age: 39
End: 2019-02-14
Payer: MEDICARE

## 2019-02-14 VITALS
HEART RATE: 92 BPM | SYSTOLIC BLOOD PRESSURE: 118 MMHG | WEIGHT: 172.84 LBS | DIASTOLIC BLOOD PRESSURE: 70 MMHG | OXYGEN SATURATION: 93 % | HEIGHT: 65 IN | TEMPERATURE: 98.6 F | BODY MASS INDEX: 28.8 KG/M2

## 2019-02-14 DIAGNOSIS — M54.50 CHRONIC BILATERAL LOW BACK PAIN WITHOUT SCIATICA: ICD-10-CM

## 2019-02-14 DIAGNOSIS — F11.90 CHRONIC, CONTINUOUS USE OF OPIOIDS: ICD-10-CM

## 2019-02-14 DIAGNOSIS — G62.9 PERIPHERAL POLYNEUROPATHY: ICD-10-CM

## 2019-02-14 DIAGNOSIS — M87.051 AVASCULAR NECROSIS OF BONES OF BOTH HIPS (HCC): ICD-10-CM

## 2019-02-14 DIAGNOSIS — G89.29 CHRONIC BILATERAL LOW BACK PAIN WITHOUT SCIATICA: ICD-10-CM

## 2019-02-14 DIAGNOSIS — M87.052 AVASCULAR NECROSIS OF BONES OF BOTH HIPS (HCC): ICD-10-CM

## 2019-02-14 DIAGNOSIS — M79.7 FIBROMYALGIA: ICD-10-CM

## 2019-02-14 PROCEDURE — 99214 OFFICE O/P EST MOD 30 MIN: CPT | Performed by: PHYSICAL MEDICINE & REHABILITATION

## 2019-02-14 RX ORDER — PREGABALIN 75 MG/1
75 CAPSULE ORAL 3 TIMES DAILY
Qty: 90 CAP | Refills: 0 | Status: SHIPPED | OUTPATIENT
Start: 2019-03-14 | End: 2019-04-10 | Stop reason: SDUPTHER

## 2019-02-14 RX ORDER — OXYCODONE HYDROCHLORIDE 20 MG/1
TABLET ORAL
Qty: 96 TAB | Refills: 0 | Status: SHIPPED | OUTPATIENT
Start: 2019-03-14 | End: 2019-04-10 | Stop reason: SDUPTHER

## 2019-02-14 RX ORDER — OXYCODONE HYDROCHLORIDE 20 MG/1
20 TABLET ORAL EVERY 6 HOURS PRN
Qty: 96 TAB | Refills: 0 | Status: SHIPPED | OUTPATIENT
Start: 2019-02-14 | End: 2019-03-14

## 2019-02-14 RX ORDER — PREGABALIN 75 MG/1
75 CAPSULE ORAL 3 TIMES DAILY
Qty: 84 CAP | Refills: 0 | Status: SHIPPED | OUTPATIENT
Start: 2019-02-14 | End: 2019-03-14

## 2019-02-14 ASSESSMENT — PATIENT HEALTH QUESTIONNAIRE - PHQ9: CLINICAL INTERPRETATION OF PHQ2 SCORE: 0

## 2019-02-14 ASSESSMENT — PAIN SCALES - GENERAL: PAINLEVEL: 6=MODERATE PAIN

## 2019-02-14 NOTE — PROGRESS NOTES
Follow up patient note  Pain Medicine, Interventional spine and sports physiatry, Physical medicine rehabilitation      Chief complaint:   Cervicalgia, hips and knees, bilateral leg pain      HISTORY      HPI  Patient identification/Interval histotry: Debby Carrizales 38 y.o. female who has chronic pain related to rheumatologic disease, peripheral neuropathy and AVN hips, lupus.       She reports that she has broken her left fifth metatarsal since the last visit.  Her foot was asleep and she landed wrong on it when she jumped up to step on it.  She has since seen her PCP and has plans for follow-up with orthopedics.    Now that she is back on the lyrica, she has been having less numbness and tingling and burning pain that she had when she was taken off this medication.  This is better at lyrica 75mg po tid.  She titrated this up gradually. Previously, she had been taking 100mg TID, but this seems to be adequately controlled at the lower dose.  Symptoms are otherwise about the same.    She has not missed dialysis since the last visit.    No side effects from oxycodone, mild constipation is managed with colace and a miralax equivalent.  She is having regular bowel movements on a daily basis.  The oxycodone helps control her pain.       ROS   Unchanged from 01/17/2019 except as noted in HPI    Red Flags :   Chills, Sweats:No fevers, chills and night sweats.  Involuntary Weight Loss: Denies  Bowel/Bladder Incontinence: Denies  Saddle Anesthesia: Denies    PMHx:   Past Medical History:   Diagnosis Date   • Arthritis     all joints,r/t lupus   • Avascular necrosis of bones of both hips (HCC) 10/10/2016   • Clostridium difficile colitis 5/3/2011   • Dialysis patient    • ESBL (extended spectrum beta-lactamase) producing bacteria infection 8/25/2014   • Fibromyalgia    • Hypertension    • Lupus    • Pneumonia    • Psychiatric disorder     anxiety, depression   • Pyelonephritis 11/21/2017   • Renal failure     • Sepsis due to pneumonia ([facility]) 6/7/2018   • Status epilepticus ([facility]) 12/13/2018       PSHx:   Past Surgical History:   Procedure Laterality Date   • AV FISTULA REVISION Right 8/11/2018    Procedure: AV FISTULA REVISION- Graft and Thrombectomy;  Surgeon: Shabbir Ardon M.D.;  Location: [facility];  Service: General   • AV FISTULA THROMBOLYSIS Right 8/11/2018    Procedure: AV FISTULA THROMBOLYSIS;  Surgeon: Shabbir Ardon M.D.;  Location: [facility];  Service: General   • AV FISTULA CREATION Right 12/9/2017    Procedure: AV FISTULA CREATION-ARM FOR GRAFT;  Surgeon: Lesly Jansen M.D.;  Location: [facility];  Service: General   • THROMBECTOMY  12/9/2017    Procedure: THROMBECTOMY;  Surgeon: Lesly Jansen M.D.;  Location: [facility];  Service: General   • THROMBECTOMY Right 8/21/2016    Procedure: THROMBECTOMY - right AV fistula graft with grams;  Surgeon: Shabbir Ardon M.D.;  Location: [facility];  Service:    • ANGIOPLASTY BALLOON  8/21/2016    Procedure: ANGIOPLASTY BALLOON;  Surgeon: Shabbir Ardon M.D.;  Location: [facility];  Service:    • THROMBECTOMY Right 8/20/2016    Procedure: THROMBECTOMY AV GRAFT;  Surgeon: Shabbir Ardon M.D.;  Location: [facility];  Service:    • AV FISTULA CREATION Right 7/12/2016    Procedure: AV FISTULA CREATION WITH GRAFT BRACHIAL AXILLARY;  Surgeon: Shabbir Ardon M.D.;  Location: [facility];  Service:    • CLOSED REDUCTION Right 7/5/2016    Procedure: CLOSED REDUCTION- Hip ;  Surgeon: Michael Holman M.D.;  Location: [facility];  Service:    • HIP ARTHROPLASTY TOTAL Right 1/18/2016    Procedure: HIP ARTHROPLASTY TOTAL;  Surgeon: Michael Holman M.D.;  Location: [facility];  Service:    • AV FISTULA CREATION  2/3/2015    Performed by Shabbir Ardon M.D. at [facility]   • AV FISTULA CREATION  11/14/2014     Performed by Shabbir Ardon M.D. at SUR[facility]ERY [facility]   • AV FISTULA CREATION  9/9/2014    Performed by Shabbir Ardon M.D. at SURGERY [facility]   • CATH PLACEMENT  9/9/2014    Performed by Shabbir Ardon M.D. at SURGERY [facility]   • OTHER  5/2011    tracheostomy   • GASTROSCOPY-ENDO  4/27/2011    Performed by PALMIRA DOAN at ENDOSCOPY [facility]   • COLONOSCOPY WITH BIOPSY  4/20/2011    Performed by ALCIRA CRUZ at END[facility]   • GASTROSCOPY-ENDO  4/18/2011    Performed by ALCIRA CRUZ at [facility]   • GASTROSCOPY-ENDO  4/10/2011    Performed by SYED JURADO at [facility]   • SCLEROTHERAPHY  4/10/2011    Performed by SYED JURADO at [facility]   • OTHER ABDOMINAL SURGERY      kidney biopsy   • TRACHEOSTOMY         Family history   Denies neuromuscular disease  Family History   Problem Relation Age of Onset   • Heart Disease Mother    • Cancer Father        Medications:   Outpatient Prescriptions Marked as Taking for the 2/14/19 encounter (Office Visit) with Quinn Chandler M.D.   Medication Sig Dispense Refill   • pregabalin (LYRICA) 75 MG Cap Take 1 Cap by mouth 3 times a day for 28 days. 84 Cap 0   • Oxycodone HCl 20 MG Tab Take 1 Tab by mouth every 6 hours as needed (Take up to three a day as needed and one additional pill after dialysis (3 times a week)) for up to 28 days. 96 Tab 0   • [START ON 3/14/2019] pregabalin (LYRICA) 75 MG Cap Take 1 Cap by mouth 3 times a day for 30 days. 90 Cap 0   • [START ON 3/14/2019] Oxycodone HCl 20 MG Tab Take 1 Tab by mouth every 6 hours as needed. (Take up to three a day as needed and one additional pill after dialysis (3 times a week)) for 28 days 96 Tab 0   • lacosamide (VIMPAT) 100 MG Tab tablet Take 1 Tab by mouth 2 Times a Day for 90 days. 180 Tab 0   • amLODIPine (NORVASC) 10 MG Tab Take 1 Tab by mouth every day. 30 Tab 0   • promethazine  (PHENERGAN) 25 MG Tab Take 25 mg by mouth every 8 hours as needed for Nausea/Vomiting.     • lidocaine (LIDODERM) 5 % Patch Apply 1 Patch to skin as directed every 24 hours. 10 Patch 1   • omeprazole (PRILOSEC) 20 MG delayed-release capsule Take 1 Cap by mouth every day. 30 Cap 3   • sevelamer carbonate (RENVELA) 800 MG Tab tablet Take 2,400 mg by mouth 3 times a day, with meals.     • ferrous sulfate 325 (65 FE) MG tablet Take 325 mg by mouth every day.     • ascorbic acid (ASCORBIC ACID) 500 MG Tab Take 500 mg by mouth every day.     • hydroxychloroquine (PLAQUENIL) 200 MG Tab Take 200 mg by mouth every bedtime.     • cholecalciferol (VITAMIN D3) 5000 UNIT Cap Take 10,000 Units by mouth every day.     • tizanidine (ZANAFLEX) 4 MG Tab Take 2 Tabs by mouth every bedtime. 60 Tab 0       Allergies:   Allergies   Allergen Reactions   • Lorazepam [Ativan] Anxiety     Patient becomes severely paranoid and agitated   • Morphine Itching     Tolerates Dilaudid   • Seasonal Runny Nose and Itching     Hay fever, sabiha brush       Social Hx:   Social History     Social History   • Marital status: Single     Spouse name: N/A   • Number of children: N/A   • Years of education: N/A     Occupational History   • Not on file.     Social History Main Topics   • Smoking status: Former Smoker     Packs/day: 0.50     Years: 18.00     Types: Cigarettes     Quit date: 6/13/2011   • Smokeless tobacco: Never Used      Comment: 1/2 ppd   • Alcohol use No   • Drug use: No   • Sexual activity: Not on file     Other Topics Concern   •  Service No   • Blood Transfusions Yes   • Caffeine Concern No   • Occupational Exposure No   • Hobby Hazards No   • Sleep Concern Yes   • Stress Concern Yes   • Weight Concern Yes   • Special Diet Yes   • Back Care No   • Exercise Yes   • Bike Helmet No   • Seat Belt Yes   • Self-Exams Yes     Social History Narrative   • No narrative on file       EXAMINATION     Physical Exam:   Vitals: Blood pressure  "118/70, pulse 92, temperature 37 °C (98.6 °F), temperature source Temporal, height 1.651 m (5' 5\"), weight 78.4 kg (172 lb 13.5 oz), last menstrual period 01/18/2019, SpO2 93 %, not currently breastfeeding.    Constitutional:   Body Habitus: Body mass index is 28.76 kg/m².  Cooperation: Fully cooperates with exam  Appearance: Well-groomed no disheveled, in no acute distress  Respiratory-  breathing comfortable on room air, no wheezing.  Psychiatric- alert and oriented ×3. Normal affect.   Spine:   Gait slow, steady without loss of balance.  No use of assist device.  Postop shoe on the left      MEDICAL DECISION MAKING    DATA    Labs:     UDS: 01/17/2019 Oxycodone and metabolites as expected    01/09/2019: Hep B surface ag negative  01/08/2019: GFR 7; BUN 23 Cr 6.68, Plt 160    12/15/2018 CRP 3.03    BMP 12/16/2018  Na 136, Potassium 4.4, chloride 96, CO2 23 Glucose 83, BUN 55H, Cr 9.44H, Calcium 9.9, Anion gap 17.0H    CBC 3.3, Hb 9.6, Hct 31.3, Plt 115    Lab Results   Component Value Date/Time    HBA1C 4.9 06/07/2018 02:29 AM        Imaging:   I reviewed the following radiology reports reviewed  Xray left foot 01/21/2019  Nondisplaced transverse fracture through the base of the fifth metatarsal.    MRI lumbar spine 1/1/19  1.  Diffusely decreased signal again seen throughout the marrow space consistent with red marrow conversion, likely secondary to chronic anemia.  2.  No significant disc bulging, central canal narrowing, or foraminal narrowing.  3.  No lumbar spine fracture or subluxation.    MRI brain 12/13/2018  1.  There is no hippocampal sclerosis.  2.  There is no evidence of cortical dysplasia or neuronal migrational abnormalities.  3.  A few nonspecific T2 hyperintensities in the supratentorial white matter likely representing nonspecific foci of gliosis, chronic ischemia and demyelination. This is unchanged since the previous MRI dated 2/23/2012.       Chest xray 06/07/2018: Minimal right-sided " opacities as described above, suggesting pneumonia.               Results for orders placed during the hospital encounter of 07/19/17   MR-LUMBAR SPINE-W/O    Impression 1.  Diffuse decreased bone marrow signal intensity throughout the lumbar spine and sacrum, presumably secondary to chronic anemia.    2.  Otherwise unremarkable MRI scan of the lumbar spine without contrast.                                    DIAGNOSIS   Visit Diagnoses     ICD-10-CM   1. Peripheral polyneuropathy (HCC) G62.9   2. Avascular necrosis of bones of both hips (HCC) M87.051    M87.052   3. Fibromyalgia M79.7   4. Chronic, continuous use of opioids F11.90   5. Chronic bilateral low back pain without sciatica M54.5    G89.29         ASSESSMENT and PLAN:     Debby Carrizales 37 y.o. Female returns for follow-up of her chronic pain related to lupus, AVN hips, low back and peripheral neuropathy    Debby was seen today for follow-up.    Diagnoses and all orders for this visit:    Peripheral polyneuropathy (HCC)  -     pregabalin (LYRICA) 75 MG Cap; Take 1 Cap by mouth 3 times a day for 28 days.  -     Oxycodone HCl 20 MG Tab; Take 1 Tab by mouth every 6 hours as needed (Take up to three a day as needed and one additional pill after dialysis (3 times a week)) for up to 28 days.  -     pregabalin (LYRICA) 75 MG Cap; Take 1 Cap by mouth 3 times a day for 30 days.  -     Oxycodone HCl 20 MG Tab; Take 1 Tab by mouth every 6 hours as needed. (Take up to three a day as needed and one additional pill after dialysis (3 times a week)) for 28 days    Avascular necrosis of bones of both hips (HCC)  -     Oxycodone HCl 20 MG Tab; Take 1 Tab by mouth every 6 hours as needed (Take up to three a day as needed and one additional pill after dialysis (3 times a week)) for up to 28 days.  -     Oxycodone HCl 20 MG Tab; Take 1 Tab by mouth every 6 hours as needed. (Take up to three a day as needed and one additional pill after dialysis (3 times a week)) for  "28 days    Fibromyalgia  -     Oxycodone HCl 20 MG Tab; Take 1 Tab by mouth every 6 hours as needed (Take up to three a day as needed and one additional pill after dialysis (3 times a week)) for up to 28 days.  -     Oxycodone HCl 20 MG Tab; Take 1 Tab by mouth every 6 hours as needed. (Take up to three a day as needed and one additional pill after dialysis (3 times a week)) for 28 days    Chronic, continuous use of opioids    Chronic bilateral low back pain without sciatica  -     Oxycodone HCl 20 MG Tab; Take 1 Tab by mouth every 6 hours as needed (Take up to three a day as needed and one additional pill after dialysis (3 times a week)) for up to 28 days.  -     Oxycodone HCl 20 MG Tab; Take 1 Tab by mouth every 6 hours as needed. (Take up to three a day as needed and one additional pill after dialysis (3 times a week)) for 28 days       Plan to delay previously planned trigger point injections.  Start home health therapies.  She reports that this will start tomorrow.    Continue with script to every 28 days.  She has difficulty with arranging transportation and it will be helpful to have it coincide with her appointments.    Discussed continuing oxycodone as previously written.  She will bring her remaining pills in to follow-up.  We discussed trying to decrease by 1/2 pill as she is able.  Continue with full dose after dialysis.  She has been on higher doses in the past, but will work on gradually decreasing as able.    Continue lyrica 75mg po tid.  Refills given today.    Follow-up with orthopedics as referred regarding the left fifth metatarsal fracture.    In prescribing controlled substances to this patient, I certify that I have obtained and reviewed the medical history of Debby Carrizales. I have also made a good ly effort to obtain applicable records from other providers who have treated the patient and records demonstrating the following: report of \"drug-seeking behavior,\" although I suspect " that she has organic reasons for pain and will continue to monitor as appropriate.     I have conducted a physical exam and documented it. I have reviewed Ms. Carrizales’s prescription history as maintained by the [place] Prescription Monitoring Program.     I have assessed the patient’s risk for abuse, dependency, and addiction using the validated Opioid Risk Tool available at https://www.mdcalc.com/ptvrpz-xdkr-kcip-ort-narcotic-abuse.     Given the above, I believe the benefits of controlled substance therapy outweigh the risks. The reasons for prescribing controlled substances include non-narcotic, oral analgesic alternatives have been inadequate for pain control and in my professional opinion, controlled substances are the only reasonable choice for this patient because she has limitations to medication choices due to being on dialysis.   Accordingly, I have discussed the risk and benefits, treatment plan, and alternative therapies with the patient.       Follow up: 8 weeks       Please note that this dictation was created using voice recognition software. I have made every reasonable attempt to correct obvious errors but there may be errors of grammar and content that I may have overlooked prior to finalization of this note.      Quinn Chandler MD  Interventional Spine and Sports Physiatry  Physical Medicine and Rehabilitation  [facility]

## 2019-02-15 NOTE — PROGRESS NOTES
Patient  Debby Carrizales  was originally discharged on 12/17/2018 for Abdominal pain.  [facility] Patient Advocate assisted with multiple discharge order including, 2 appointments: 1- Primary Care Physician  and 1- Physiatry appointment. Patient kept both of these two appointments. [facility] also confirmed that patient was receiving dialysis upon discharge starting on 12/17. Patient was discharge with Home Health  orders, however, patient  declined services on 12/20. Patient readmitted to [facility] on 01/01 for failure to thrive in adult  and was discharge on 01/09. [facility] assisted with new discharge orders including, 2- Primary Care physician,1- Physiatry, and 1- Rheumatology follow up. Patient only kept two out  of these four appointments. The patient has 3  future appointment scheduled: 1- Neurology , 1- Physiatry appointment and 1- Rheumatology follow. PPS 60%.

## 2019-03-04 ENCOUNTER — OFFICE VISIT (OUTPATIENT)
Dept: NEUROLOGY | Facility: MEDICAL CENTER | Age: 39
End: 2019-03-04
Payer: MEDICARE

## 2019-03-04 VITALS
TEMPERATURE: 98.3 F | SYSTOLIC BLOOD PRESSURE: 132 MMHG | DIASTOLIC BLOOD PRESSURE: 84 MMHG | HEIGHT: 65 IN | HEART RATE: 90 BPM | BODY MASS INDEX: 30.19 KG/M2 | WEIGHT: 181.2 LBS | RESPIRATION RATE: 16 BRPM | OXYGEN SATURATION: 98 %

## 2019-03-04 DIAGNOSIS — G40.909 SEIZURE DISORDER (HCC): ICD-10-CM

## 2019-03-04 DIAGNOSIS — M32.9 SYSTEMIC LUPUS ERYTHEMATOSUS, UNSPECIFIED SLE TYPE, UNSPECIFIED ORGAN INVOLVEMENT STATUS (HCC): ICD-10-CM

## 2019-03-04 PROCEDURE — 99214 OFFICE O/P EST MOD 30 MIN: CPT | Performed by: PSYCHIATRY & NEUROLOGY

## 2019-03-04 RX ORDER — LACOSAMIDE 100 MG/1
100 TABLET ORAL 2 TIMES DAILY
Qty: 180 TAB | Refills: 1 | Status: SHIPPED | OUTPATIENT
Start: 2019-03-04 | End: 2019-06-02

## 2019-03-04 NOTE — PROGRESS NOTES
NEUROLOGY NOTE    Referring Physician  Sushila Manzano M.D.      CHIEF COMPLAINT:  Seizures since 2016 and diagnosed was probable seizure Dec 2018  The patient saw Dr Donnelly years ago for her neuropathy-- lupus, and fibromyalgia  Chief Complaint   Patient presents with   • Establish Care     Seizure       PRESENT ILLNESS:   Seizures diagnosed since Dec 2018 (she was admitted to this hospital for Lupus , and was found to have seizures)  The patient saw Dr Donnelly years ago for her neuropathy-- lupus, and fibromyalgia    The patient started having seizures, made her sleepy, trouble expressing herself,  She might have these spells since 2016    She has has kidney failure-- under dialysis since 2016, HTN, status post right hip replacement, ( chronic steroid use)    PAST MEDICAL HISTORY:  Past Medical History:   Diagnosis Date   • Arthritis     all joints,r/t lupus   • Avascular necrosis of bones of both hips ([facility]) 10/10/2016   • Clostridium difficile colitis 5/3/2011   • Dialysis patient    • ESBL (extended spectrum beta-lactamase) producing bacteria infection 8/25/2014   • Fibromyalgia    • Hypertension    • Lupus    • Pneumonia    • Psychiatric disorder     anxiety, depression   • Pyelonephritis 11/21/2017   • Renal failure    • Sepsis due to pneumonia ([facility]) 6/7/2018   • Status epilepticus ([facility]) 12/13/2018       PAST SURGICAL HISTORY:  Past Surgical History:   Procedure Laterality Date   • AV FISTULA REVISION Right 8/11/2018    Procedure: AV FISTULA REVISION- Graft and Thrombectomy;  Surgeon: Shabbir Ardon M.D.;  Location: [facility];  Service: General   • AV FISTULA THROMBOLYSIS Right 8/11/2018    Procedure: AV FISTULA THROMBOLYSIS;  Surgeon: Shabbir Ardon M.D.;  Location: SURGERY [facility];  Service: General   • AV FISTULA CREATION Right 12/9/2017    Procedure: AV FISTULA CREATION-ARM FOR GRAFT;  Surgeon: Lesly Jansen M.D.;  Location: SURGERY [facility];  Service: General      • THROMBECTOMY  12/9/2017    Procedure: THROMBECTOMY;  Surgeon: Lesly Jansen M.D.;  Location: SURGERY [facility];  Service: General   • THROMBECTOMY Right 8/21/2016    Procedure: THROMBECTOMY - right AV fistula graft with grams;  Surgeon: Shabbir Ardon M.D.;  Location: SURGERY [facility];  Service:    • ANGIOPLASTY BALLOON  8/21/2016    Procedure: ANGIOPLASTY BALLOON;  Surgeon: Shabbir Ardon M.D.;  Location: SURGERY [facility];  Service:    • THROMBECTOMY Right 8/20/2016    Procedure: THROMBECTOMY AV GRAFT;  Surgeon: Shabbir Ardon M.D.;  Location: SURGERY [facility];  Service:    • AV FISTULA CREATION Right 7/12/2016    Procedure: AV FISTULA CREATION WITH GRAFT BRACHIAL AXILLARY;  Surgeon: Shabbir Ardon M.D.;  Location: SURGERY [facility];  Service:    • CLOSED REDUCTION Right 7/5/2016    Procedure: CLOSED REDUCTION- Hip ;  Surgeon: Michael Holman M.D.;  Location: SURGERY [facility];  Service:    • HIP ARTHROPLASTY TOTAL Right 1/18/2016    Procedure: HIP ARTHROPLASTY TOTAL;  Surgeon: Michael Holman M.D.;  Location: [facility];  Service:    • AV FISTULA CREATION  2/3/2015    Performed by Shabbir Ardon M.D. at [facility]   • AV FISTULA CREATION  11/14/2014    Performed by Shabbir Ardon M.D. at [facility]   • AV FISTULA CREATION  9/9/2014    Performed by Shabbir Ardon M.D. at SURGERY [facility]   • CATH PLACEMENT  9/9/2014    Performed by Shabbir Ardon M.D. at [facility]   • OTHER  5/2011    tracheostomy   • GASTROSCOPY-ENDO  4/27/2011    Performed by PALMIRA DOAN at ENDOSCOPY [facility]   • COLONOSCOPY WITH BIOPSY  4/20/2011    Performed by ALCIRA CRUZ at ENDOSCOPY [facility]   • GASTROSCOPY-ENDO  4/18/2011    Performed by ALCIRA CRUZ at ENDOSCOPY [facility]   • GASTROSCOPY-ENDO  4/10/2011    Performed by SYED JURADO at ENDOSCOPY [facility]   •  SCLEROTHERAPHY  4/10/2011    Performed by SYED JURADO at ENDOSCOPY [facility]   • OTHER ABDOMINAL SURGERY      kidney biopsy   • TRACHEOSTOMY         FAMILY HISTORY:  Family History   Problem Relation Age of Onset   • Heart Disease Mother    • Cancer Father        SOCIAL HISTORY:  Social History     Social History   • Marital status: Single     Spouse name: N/A   • Number of children: N/A   • Years of education: N/A     Occupational History   • Not on file.     Social History Main Topics   • Smoking status: Former Smoker     Packs/day: 0.50     Years: 18.00     Types: Cigarettes     Quit date: 6/13/2011   • Smokeless tobacco: Never Used      Comment: 1/2 ppd   • Alcohol use No   • Drug use: No   • Sexual activity: Not on file     Other Topics Concern   •  Service No   • Blood Transfusions Yes   • Caffeine Concern No   • Occupational Exposure No   • Hobby Hazards No   • Sleep Concern Yes   • Stress Concern Yes   • Weight Concern Yes   • Special Diet Yes   • Back Care No   • Exercise Yes   • Bike Helmet No   • Seat Belt Yes   • Self-Exams Yes     Social History Narrative   • No narrative on file     ALLERGIES:  Allergies   Allergen Reactions   • Lorazepam [Ativan] Anxiety     Patient becomes severely paranoid and agitated   • Morphine Itching     Tolerates Dilaudid   • Seasonal Runny Nose and Itching     Hay fever, sabiha brush     TOBHX  History   Smoking Status   • Former Smoker   • Packs/day: 0.50   • Years: 18.00   • Types: Cigarettes   • Quit date: 6/13/2011   Smokeless Tobacco   • Never Used     Comment: 1/2 ppd     ALCHX  History   Alcohol Use No     DRUGHX  History   Drug Use No           MEDICATIONS:  Current Outpatient Prescriptions   Medication   • pregabalin (LYRICA) 75 MG Cap   • Oxycodone HCl 20 MG Tab   • [START ON 3/14/2019] pregabalin (LYRICA) 75 MG Cap   • [START ON 3/14/2019] Oxycodone HCl 20 MG Tab   • lacosamide (VIMPAT) 100 MG Tab tablet   • amLODIPine (NORVASC) 10 MG Tab   •  "promethazine (PHENERGAN) 25 MG Tab   • lidocaine (LIDODERM) 5 % Patch   • omeprazole (PRILOSEC) 20 MG delayed-release capsule   • sevelamer carbonate (RENVELA) 800 MG Tab tablet   • ferrous sulfate 325 (65 FE) MG tablet   • ascorbic acid (ASCORBIC ACID) 500 MG Tab   • hydroxychloroquine (PLAQUENIL) 200 MG Tab   • cholecalciferol (VITAMIN D3) 5000 UNIT Cap   • trazodone (DESYREL) 50 MG Tab   • tizanidine (ZANAFLEX) 4 MG Tab     No current facility-administered medications for this visit.        REVIEW OF SYSTEM:    Constitutional: Denies fevers, Denies weight changes   Eyes: Denies changes in vision, no eye pain   Ears/Nose/Throat/Mouth: Denies nasal congestion or sore throat   Cardiovascular: Denies chest pain or palpitations   Respiratory: Denies SOB.   Gastrointestinal/Hepatic: Denies abdominal pain, nausea, vomiting, diarrhea, constipation or GI bleeding   Genitourinary: Denies bladder dysfunction, dysuria or frequency   Musculoskeletal/Rheum: bilateral avascular necrosis  Skin/Breast: Denies rash, denies breast lumps or discharge   Neurological: spells of speech problems  Psychiatric: denies mood disorder   Endocrine: denies hx of diabetes or thyroid dysfunction   Heme/Oncology/Lymph Nodes: Denies enlarged lymph nodes, denies brusing or known bleeding disorder   Allergic/Immunologic: Lupus, on plaquinil      PHYSICAL AND NEUROLOGICAL EXMAINATIONS:  VITAL SIGNS: /84 (BP Location: Left arm, Patient Position: Sitting, BP Cuff Size: Adult)   Pulse 90   Temp 36.8 °C (98.3 °F) (Temporal)   Resp 16   Ht 1.651 m (5' 5\")   Wt 82.2 kg (181 lb 3.2 oz)   SpO2 98%   BMI 30.15 kg/m²   CURRENT WEIGHT:  BMI: Body mass index is 30.15 kg/m².  PREVIOUS WEIGHTS:  Wt Readings from Last 25 Encounters:   03/04/19 82.2 kg (181 lb 3.2 oz)   02/14/19 78.4 kg (172 lb 13.5 oz)   02/01/19 79.8 kg (176 lb)   01/21/19 75 kg (165 lb 5.5 oz)   01/17/19 74.9 kg (165 lb 2 oz)   01/08/19 74.2 kg (163 lb 9.3 oz)   12/21/18 79.8 kg (176 " lb)   12/18/18 78.9 kg (173 lb 15.1 oz)   12/17/18 77 kg (169 lb 12.1 oz)   11/26/18 86 kg (189 lb 9.5 oz)   11/20/18 86.4 kg (190 lb 7.6 oz)   11/09/18 83.6 kg (184 lb 4.9 oz)   10/23/18 85.5 kg (188 lb 7.9 oz)   09/25/18 82.9 kg (182 lb 12.2 oz)   08/28/18 82 kg (180 lb 12.4 oz)   08/10/18 82.6 kg (182 lb 1.6 oz)   07/31/18 82.7 kg (182 lb 5.1 oz)   07/21/18 85.9 kg (189 lb 6 oz)   06/26/18 84.4 kg (186 lb)   06/08/18 81.5 kg (179 lb 10.8 oz)   05/29/18 88 kg (194 lb)   05/02/18 86.2 kg (190 lb)   05/01/18 87.5 kg (193 lb)   04/17/18 89.2 kg (196 lb 10.4 oz)   04/12/18 88 kg (194 lb)       General appearance of patient: WDWN(+) NAD(+)    EYES  o Fundus : Papilledem(-) Exudates(-) Hemorrhage(-)  Nervous System  Orientation to time, place and person(+)  Memory normal(-)  Language: aphasia(-)  Knowledge: past(+) Current(+)  Attention(+)  Cranial Nerves  • Nerve 2: intact  • Nerve 3,4,6: intact  • Nerve 5 : intact  • Nerve 7: intact  • Nerve 8: intact  • Nerve 9 & 10: intact  • Nerve 11: intact  • Nerve 12: intact  Muscle Power and muscle tone: symmetric, normal in upper and lower  Sensory System: Pin sensation decreased below midcalf  Reflexes: symmetric throughout  Cerebellar Function FNP normal   Gait : Steady(-) still able to walk by herself   Heart and Vascular  Peripheral Vasucular system : Edema (-) Swelling(-)  RHB, Breathing sound clear  abdomen bowel sound normoactive  Extremities freely moveable  Joints no contracture       NEUROIMAGING: I reviewed the MRI/CT of brain       LAB:        Keppra 1000mg made her sleepy and tremor    IMPRESSION:    1.Seizures of speech disturbances and confusion since 2016-- improved with Vimpat ( once every week prior to taking Vimpat)  2.Hx of Lupus ( since 2004), Renal Failure, Bilateral Hip avascular necrosis, HTN, Polyneuropathy ( since 2004), Frequent hospital admissions due to Lupus      PLAN/RECOMMENDATIONS:    The seizures might come from Lupus--- one kind of autoimmune  cereberitis  So does the polyneuropathy    The patient's current seizures responded to Vimpat ( based on her statement)  We will continue the Vimpat  In case of the copay of vimpat is too high, please feel free to contact us-- we could try to switch to other seizure medication        SIGNATURE:  JesusitaPatriciaJosy Leonardo    CC:  Sushila Manzano M.D.      INTERPRETATION:         ________________________________________________________________________     This is abnormal routine video EEG recording in the awake and drowsy/sleep state(s).     This scalp EEG denotes                  1. Diffuse nonspecific cortical dysfunction - moderate                 2. Focal cortical dysfunction / irritability over frontal (R>L)--this patten increases the risk of frontal seizure - advise clinical correlation regarding management      If clinical suspicion of active seizure remains high.  Prolonged EEG   monitoring may be of help.     EEG 01/02/19 11:54 AM     ________________________________________________________________________   EEG Progress Note  Date of Service: 12/13/2018 10:33 AM  Aman Flores M.D.   Neurology      []Hide copied text  PRELIMINARY EEG READ:      Pt in electrographic status epilepticus.   RN notified. Sent message to attending physician to call me back asap.

## 2019-03-04 NOTE — PATIENT INSTRUCTIONS
Keppra 1000mg made her sleepy and tremor    IMPRESSION:    1.Seizures of speech disturbances and confusion since 2016-- improved with Vimpat ( once every week prior to taking Vimpat)  2.Hx of Lupus ( since 2004), Renal Failure, Bilateral Hip avascular necrosis, HTN, Polyneuropathy ( since 2004), Frequent hospital admissions due to Lupus      PLAN/RECOMMENDATIONS:    The seizures might come from Lupus--- one kind of autoimmune cereberitis  So does the polyneuropathy    The patient's current seizures responded to Vimpat ( based on her statement)  We will continue the Vimpat  In case of the copay of vimpat is too high, please feel free to contact us-- we could try to switch to other seizure medication        SIGNATURE:  Zenaida Leonardo    CC:  Sushila Manzano M.D.      INTERPRETATION:         ________________________________________________________________________     This is abnormal routine video EEG recording in the awake and drowsy/sleep state(s).     This scalp EEG denotes                  1. Diffuse nonspecific cortical dysfunction - moderate                 2. Focal cortical dysfunction / irritability over frontal (R>L)--this patten increases the risk of frontal seizure - advise clinical correlation regarding management      If clinical suspicion of active seizure remains high.  Prolonged EEG   monitoring may be of help.     EEG 01/02/19 11:54 AM     ________________________________________________________________________   EEG Progress Note  Date of Service: 12/13/2018 10:33 AM  Aman Flores M.D.   Neurology      []Hide copied text  PRELIMINARY EEG READ:      Pt in electrographic status epilepticus.   RN notified. Sent message to attending physician to call me back asap.

## 2019-04-01 ENCOUNTER — HOSPITAL ENCOUNTER (INPATIENT)
Dept: HOSPITAL 8 - ED | Age: 39
LOS: 2 days | Discharge: HOME | DRG: 545 | End: 2019-04-03
Attending: INTERNAL MEDICINE | Admitting: INTERNAL MEDICINE
Payer: MEDICAID

## 2019-04-01 VITALS — BODY MASS INDEX: 31.15 KG/M2 | WEIGHT: 186.95 LBS | HEIGHT: 65 IN

## 2019-04-01 DIAGNOSIS — E83.51: ICD-10-CM

## 2019-04-01 DIAGNOSIS — M89.9: ICD-10-CM

## 2019-04-01 DIAGNOSIS — D63.1: ICD-10-CM

## 2019-04-01 DIAGNOSIS — G40.909: ICD-10-CM

## 2019-04-01 DIAGNOSIS — E16.2: ICD-10-CM

## 2019-04-01 DIAGNOSIS — Z96.641: ICD-10-CM

## 2019-04-01 DIAGNOSIS — N18.6: ICD-10-CM

## 2019-04-01 DIAGNOSIS — M79.7: ICD-10-CM

## 2019-04-01 DIAGNOSIS — Z99.2: ICD-10-CM

## 2019-04-01 DIAGNOSIS — F17.210: ICD-10-CM

## 2019-04-01 DIAGNOSIS — I12.0: ICD-10-CM

## 2019-04-01 DIAGNOSIS — M32.9: Primary | ICD-10-CM

## 2019-04-01 DIAGNOSIS — G89.29: ICD-10-CM

## 2019-04-01 PROCEDURE — 82040 ASSAY OF SERUM ALBUMIN: CPT

## 2019-04-01 PROCEDURE — 82962 GLUCOSE BLOOD TEST: CPT

## 2019-04-01 PROCEDURE — 82330 ASSAY OF CALCIUM: CPT

## 2019-04-01 PROCEDURE — 36415 COLL VENOUS BLD VENIPUNCTURE: CPT

## 2019-04-01 PROCEDURE — 84443 ASSAY THYROID STIM HORMONE: CPT

## 2019-04-01 PROCEDURE — 93005 ELECTROCARDIOGRAM TRACING: CPT

## 2019-04-01 PROCEDURE — 99285 EMERGENCY DEPT VISIT HI MDM: CPT

## 2019-04-01 PROCEDURE — 96376 TX/PRO/DX INJ SAME DRUG ADON: CPT

## 2019-04-01 PROCEDURE — 82306 VITAMIN D 25 HYDROXY: CPT

## 2019-04-01 PROCEDURE — 84100 ASSAY OF PHOSPHORUS: CPT

## 2019-04-01 PROCEDURE — 81025 URINE PREGNANCY TEST: CPT

## 2019-04-01 PROCEDURE — 71045 X-RAY EXAM CHEST 1 VIEW: CPT

## 2019-04-01 PROCEDURE — 85025 COMPLETE CBC W/AUTO DIFF WBC: CPT

## 2019-04-01 PROCEDURE — 74176 CT ABD & PELVIS W/O CONTRAST: CPT

## 2019-04-01 PROCEDURE — 82310 ASSAY OF CALCIUM: CPT

## 2019-04-01 PROCEDURE — 96374 THER/PROPH/DIAG INJ IV PUSH: CPT

## 2019-04-01 PROCEDURE — 83735 ASSAY OF MAGNESIUM: CPT

## 2019-04-01 PROCEDURE — 81001 URINALYSIS AUTO W/SCOPE: CPT

## 2019-04-01 PROCEDURE — 83970 ASSAY OF PARATHORMONE: CPT

## 2019-04-01 PROCEDURE — 80053 COMPREHEN METABOLIC PANEL: CPT

## 2019-04-01 PROCEDURE — 83690 ASSAY OF LIPASE: CPT

## 2019-04-01 PROCEDURE — 80048 BASIC METABOLIC PNL TOTAL CA: CPT

## 2019-04-01 PROCEDURE — 96375 TX/PRO/DX INJ NEW DRUG ADDON: CPT

## 2019-04-01 PROCEDURE — 84703 CHORIONIC GONADOTROPIN ASSAY: CPT

## 2019-04-02 VITALS — SYSTOLIC BLOOD PRESSURE: 127 MMHG | DIASTOLIC BLOOD PRESSURE: 86 MMHG

## 2019-04-02 VITALS — SYSTOLIC BLOOD PRESSURE: 131 MMHG | DIASTOLIC BLOOD PRESSURE: 85 MMHG

## 2019-04-02 VITALS — DIASTOLIC BLOOD PRESSURE: 68 MMHG | SYSTOLIC BLOOD PRESSURE: 114 MMHG

## 2019-04-02 VITALS — SYSTOLIC BLOOD PRESSURE: 127 MMHG | DIASTOLIC BLOOD PRESSURE: 85 MMHG

## 2019-04-02 LAB
ALBUMIN SERPL-MCNC: 2.5 G/DL (ref 3.4–5)
ALP SERPL-CCNC: 88 U/L (ref 45–117)
ALT SERPL-CCNC: 17 U/L (ref 12–78)
ANION GAP SERPL CALC-SCNC: 13 MMOL/L (ref 5–15)
BASOPHILS # BLD AUTO: 0.04 X10^3/UL (ref 0–0.1)
BASOPHILS NFR BLD AUTO: 1 % (ref 0–1)
BILIRUB SERPL-MCNC: 0.4 MG/DL (ref 0.2–1)
CALCIUM SERPL-MCNC: 5.5 MG/DL (ref 8.5–10.1)
CALCIUM SERPL-MCNC: 8 MG/DL (ref 8.5–10.1)
CHLORIDE SERPL-SCNC: 109 MMOL/L (ref 98–107)
CREAT SERPL-MCNC: 4.62 MG/DL (ref 0.55–1.02)
CULTURE INDICATED?: NO
EOSINOPHIL # BLD AUTO: 0.11 X10^3/UL (ref 0–0.4)
EOSINOPHIL NFR BLD AUTO: 2 % (ref 1–7)
ERYTHROCYTE [DISTWIDTH] IN BLOOD BY AUTOMATED COUNT: 14.3 % (ref 9.6–15.2)
HCG UR SG: 1.01 (ref 1–1.03)
LYMPHOCYTES # BLD AUTO: 1.27 X10^3/UL (ref 1–3.4)
LYMPHOCYTES NFR BLD AUTO: 22 % (ref 22–44)
MCH RBC QN AUTO: 30.5 PG (ref 27–34.8)
MCHC RBC AUTO-ENTMCNC: 32.5 G/DL (ref 32.4–35.8)
MCV RBC AUTO: 93.7 FL (ref 80–100)
MD: NO
MICROSCOPIC: (no result)
MONOCYTES # BLD AUTO: 0.51 X10^3/UL (ref 0.2–0.8)
MONOCYTES NFR BLD AUTO: 9 % (ref 2–9)
NEUTROPHILS # BLD AUTO: 3.9 X10^3/UL (ref 1.8–6.8)
NEUTROPHILS NFR BLD AUTO: 67 % (ref 42–75)
PLATELET # BLD AUTO: 150 X10^3/UL (ref 130–400)
PMV BLD AUTO: 8.6 FL (ref 7.4–10.4)
PROT SERPL-MCNC: 5.2 G/DL (ref 6.4–8.2)
RBC # BLD AUTO: 4 X10^6/UL (ref 3.82–5.3)

## 2019-04-02 RX ADMIN — DOCUSATE SODIUM SCH MG: 100 CAPSULE, LIQUID FILLED ORAL at 09:14

## 2019-04-02 RX ADMIN — DOCUSATE SODIUM SCH MG: 100 CAPSULE, LIQUID FILLED ORAL at 20:26

## 2019-04-02 RX ADMIN — SODIUM CHLORIDE, PRESERVATIVE FREE SCH ML: 5 INJECTION INTRAVENOUS at 20:28

## 2019-04-02 RX ADMIN — PROMETHAZINE HYDROCHLORIDE PRN MG: 25 TABLET ORAL at 10:54

## 2019-04-02 RX ADMIN — OXYCODONE HYDROCHLORIDE PRN MG: 5 TABLET ORAL at 09:14

## 2019-04-02 RX ADMIN — METHOCARBAMOL PRN MG: 500 TABLET ORAL at 20:25

## 2019-04-02 RX ADMIN — PROMETHAZINE HYDROCHLORIDE PRN MG: 25 TABLET ORAL at 23:55

## 2019-04-02 RX ADMIN — OXYCODONE HYDROCHLORIDE PRN MG: 5 TABLET ORAL at 16:12

## 2019-04-02 RX ADMIN — LACOSAMIDE SCH MG: 50 TABLET, FILM COATED ORAL at 20:26

## 2019-04-02 RX ADMIN — HYDROMORPHONE HYDROCHLORIDE PRN MG: 2 INJECTION INTRAMUSCULAR; INTRAVENOUS; SUBCUTANEOUS at 00:16

## 2019-04-02 RX ADMIN — HYDROMORPHONE HYDROCHLORIDE PRN MG: 2 INJECTION INTRAMUSCULAR; INTRAVENOUS; SUBCUTANEOUS at 01:56

## 2019-04-02 RX ADMIN — OXYCODONE HYDROCHLORIDE PRN MG: 5 TABLET ORAL at 23:55

## 2019-04-02 RX ADMIN — LACOSAMIDE SCH MG: 50 TABLET, FILM COATED ORAL at 09:14

## 2019-04-02 RX ADMIN — SEVELAMER CARBONATE SCH MG: 800 TABLET, FILM COATED ORAL at 16:30

## 2019-04-02 RX ADMIN — PREGABALIN SCH MG: 150 CAPSULE ORAL at 20:26

## 2019-04-03 VITALS — DIASTOLIC BLOOD PRESSURE: 85 MMHG | SYSTOLIC BLOOD PRESSURE: 129 MMHG

## 2019-04-03 LAB
ALBUMIN SERPL-MCNC: 2.9 G/DL (ref 3.4–5)
ANION GAP SERPL CALC-SCNC: 11 MMOL/L (ref 5–15)
BASOPHILS # BLD AUTO: 0.07 X10^3/UL (ref 0–0.1)
BASOPHILS NFR BLD AUTO: 1 % (ref 0–1)
CALCIUM SERPL-MCNC: 7.8 MG/DL (ref 8.5–10.1)
CHLORIDE SERPL-SCNC: 101 MMOL/L (ref 98–107)
CREAT SERPL-MCNC: 9.31 MG/DL (ref 0.55–1.02)
EOSINOPHIL # BLD AUTO: 0.2 X10^3/UL (ref 0–0.4)
EOSINOPHIL NFR BLD AUTO: 3 % (ref 1–7)
ERYTHROCYTE [DISTWIDTH] IN BLOOD BY AUTOMATED COUNT: 13.8 % (ref 9.6–15.2)
LYMPHOCYTES # BLD AUTO: 1.54 X10^3/UL (ref 1–3.4)
LYMPHOCYTES NFR BLD AUTO: 26 % (ref 22–44)
MCH RBC QN AUTO: 31.4 PG (ref 27–34.8)
MCHC RBC AUTO-ENTMCNC: 33.6 G/DL (ref 32.4–35.8)
MCV RBC AUTO: 93.3 FL (ref 80–100)
MD: (no result)
MONOCYTES # BLD AUTO: 0.56 X10^3/UL (ref 0.2–0.8)
MONOCYTES NFR BLD AUTO: 10 % (ref 2–9)
NEUTROPHILS # BLD AUTO: 3.49 X10^3/UL (ref 1.8–6.8)
NEUTROPHILS NFR BLD AUTO: 60 % (ref 42–75)
PLATELET # BLD AUTO: 118 X10^3/UL (ref 130–400)
PMV BLD AUTO: 8.8 FL (ref 7.4–10.4)
RBC # BLD AUTO: 3.87 X10^6/UL (ref 3.82–5.3)
TSH SERPL-ACNC: 2.06 MIU/L (ref 0.36–3.74)

## 2019-04-03 PROCEDURE — 5A1D70Z PERFORMANCE OF URINARY FILTRATION, INTERMITTENT, LESS THAN 6 HOURS PER DAY: ICD-10-PCS | Performed by: INTERNAL MEDICINE

## 2019-04-03 RX ADMIN — DOCUSATE SODIUM SCH MG: 100 CAPSULE, LIQUID FILLED ORAL at 09:00

## 2019-04-03 RX ADMIN — OXYCODONE HYDROCHLORIDE PRN MG: 5 TABLET ORAL at 08:08

## 2019-04-03 RX ADMIN — SEVELAMER CARBONATE SCH MG: 800 TABLET, FILM COATED ORAL at 08:30

## 2019-04-03 RX ADMIN — SODIUM CHLORIDE, PRESERVATIVE FREE SCH ML: 5 INJECTION INTRAVENOUS at 12:47

## 2019-04-03 RX ADMIN — OXYCODONE HYDROCHLORIDE PRN MG: 5 TABLET ORAL at 13:21

## 2019-04-03 RX ADMIN — PROMETHAZINE HYDROCHLORIDE PRN MG: 25 TABLET ORAL at 08:12

## 2019-04-03 RX ADMIN — PREGABALIN SCH MG: 150 CAPSULE ORAL at 12:46

## 2019-04-03 RX ADMIN — LACOSAMIDE SCH MG: 50 TABLET, FILM COATED ORAL at 12:46

## 2019-04-03 RX ADMIN — METHOCARBAMOL PRN MG: 500 TABLET ORAL at 03:26

## 2019-04-03 RX ADMIN — METHOCARBAMOL PRN MG: 500 TABLET ORAL at 12:46

## 2019-04-03 RX ADMIN — SEVELAMER CARBONATE SCH MG: 800 TABLET, FILM COATED ORAL at 12:46

## 2019-04-04 ENCOUNTER — OFFICE VISIT (OUTPATIENT)
Dept: MEDICAL GROUP | Facility: MEDICAL CENTER | Age: 39
End: 2019-04-04
Payer: MEDICARE

## 2019-04-04 VITALS
SYSTOLIC BLOOD PRESSURE: 118 MMHG | HEIGHT: 65 IN | TEMPERATURE: 98.6 F | OXYGEN SATURATION: 96 % | BODY MASS INDEX: 29.82 KG/M2 | HEART RATE: 76 BPM | WEIGHT: 179 LBS | DIASTOLIC BLOOD PRESSURE: 72 MMHG | RESPIRATION RATE: 16 BRPM

## 2019-04-04 DIAGNOSIS — M32.14: ICD-10-CM

## 2019-04-04 DIAGNOSIS — Z99.2 ESRD (END STAGE RENAL DISEASE) ON DIALYSIS (HCC): ICD-10-CM

## 2019-04-04 DIAGNOSIS — E83.51 HYPOCALCEMIA: ICD-10-CM

## 2019-04-04 DIAGNOSIS — N18.6 ESRD (END STAGE RENAL DISEASE) ON DIALYSIS (HCC): ICD-10-CM

## 2019-04-04 DIAGNOSIS — M79.7 FIBROMYALGIA: ICD-10-CM

## 2019-04-04 DIAGNOSIS — M32.9 SYSTEMIC LUPUS ERYTHEMATOSUS, UNSPECIFIED SLE TYPE, UNSPECIFIED ORGAN INVOLVEMENT STATUS (HCC): ICD-10-CM

## 2019-04-04 DIAGNOSIS — I73.9 CLAUDICATION (HCC): ICD-10-CM

## 2019-04-04 PROCEDURE — 99213 OFFICE O/P EST LOW 20 MIN: CPT | Performed by: FAMILY MEDICINE

## 2019-04-04 RX ORDER — TRAZODONE HYDROCHLORIDE 50 MG/1
50 TABLET ORAL
Qty: 90 TAB | Refills: 3 | Status: SHIPPED | OUTPATIENT
Start: 2019-04-04 | End: 2020-04-20

## 2019-04-04 RX ORDER — TIZANIDINE 4 MG/1
8 TABLET ORAL
Qty: 180 TAB | Refills: 3 | Status: SHIPPED | OUTPATIENT
Start: 2019-04-04 | End: 2019-06-21

## 2019-04-04 RX ORDER — TIZANIDINE 4 MG/1
8 TABLET ORAL
Qty: 60 TAB | Refills: 3 | Status: SHIPPED | OUTPATIENT
Start: 2019-04-04 | End: 2019-04-04 | Stop reason: SDUPTHER

## 2019-04-04 RX ORDER — PROMETHAZINE HYDROCHLORIDE 25 MG/1
25 TABLET ORAL EVERY 8 HOURS PRN
Qty: 60 TAB | Refills: 3 | Status: SHIPPED | OUTPATIENT
Start: 2019-04-04 | End: 2019-07-29 | Stop reason: SDUPTHER

## 2019-04-04 RX ORDER — TRAZODONE HYDROCHLORIDE 50 MG/1
50 TABLET ORAL
Qty: 30 TAB | Refills: 3 | Status: SHIPPED | OUTPATIENT
Start: 2019-04-04 | End: 2019-04-04 | Stop reason: SDUPTHER

## 2019-04-04 NOTE — PROGRESS NOTES
cc:  hypocalcemia    Subjective:     Debby Carrizales is a 38 y.o. female presenting for follow-up of a hospitalization.  She reports being hospitalized in [facility].  Was admitted for hypocalcemia and back pain.  She reports that after dialysis, she developed nausea, vomiting, worsening back pain.  Went to the emergency department, calcium was noted to be in the fives.  She was admitted and her calcium was replaced.  Since discharge, she reports feeling back to baseline.  She continues to have low back pain, but no worse than before.  She continues to see physiatry for chronic pain.  She does note pain in her feet and legs when she walks, this has been progressively getting worse with time.  Symptoms improve at rest.  Denies any injuries, redness, swelling, skin changes.      Review of systems:  See above.       Current Outpatient Prescriptions:   •  Methocarbamol (ROBAXIN PO), Take  by mouth., Disp: , Rfl:   •  promethazine (PHENERGAN) 25 MG Tab, Take 1 Tab by mouth every 8 hours as needed for Nausea/Vomiting., Disp: 60 Tab, Rfl: 3  •  lacosamide (VIMPAT) 100 MG Tab tablet, Take 1 Tab by mouth 2 Times a Day for 90 days., Disp: 180 Tab, Rfl: 1  •  pregabalin (LYRICA) 75 MG Cap, Take 1 Cap by mouth 3 times a day for 30 days., Disp: 90 Cap, Rfl: 0  •  Oxycodone HCl 20 MG Tab, Take 1 Tab by mouth every 6 hours as needed. (Take up to three a day as needed and one additional pill after dialysis (3 times a week)) for 28 days, Disp: 96 Tab, Rfl: 0  •  amLODIPine (NORVASC) 10 MG Tab, Take 1 Tab by mouth every day., Disp: 30 Tab, Rfl: 0  •  lidocaine (LIDODERM) 5 % Patch, Apply 1 Patch to skin as directed every 24 hours., Disp: 10 Patch, Rfl: 1  •  omeprazole (PRILOSEC) 20 MG delayed-release capsule, Take 1 Cap by mouth every day., Disp: 30 Cap, Rfl: 3  •  sevelamer carbonate (RENVELA) 800 MG Tab tablet, Take 2,400 mg by mouth 3 times a day, with meals., Disp: , Rfl:   •  ferrous sulfate 325 (65 FE) MG  "tablet, Take 325 mg by mouth every day., Disp: , Rfl:   •  ascorbic acid (ASCORBIC ACID) 500 MG Tab, Take 500 mg by mouth every day., Disp: , Rfl:   •  hydroxychloroquine (PLAQUENIL) 200 MG Tab, Take 200 mg by mouth every bedtime., Disp: , Rfl:   •  cholecalciferol (VITAMIN D3) 5000 UNIT Cap, Take 10,000 Units by mouth every day., Disp: , Rfl:   •  traZODone (DESYREL) 50 MG Tab, Take 1 Tab by mouth every bedtime., Disp: 90 Tab, Rfl: 3  •  tizanidine (ZANAFLEX) 4 MG Tab, Take 2 Tabs by mouth every bedtime., Disp: 180 Tab, Rfl: 3    Allergies, past medical history, past surgical history, family history, social history reviewed and updated    Objective:     Vitals: /72 (BP Location: Right arm, Patient Position: Sitting)   Pulse 76   Temp 37 °C (98.6 °F) (Temporal)   Resp 16   Ht 1.651 m (5' 5\")   Wt 81.2 kg (179 lb)   SpO2 96%   BMI 29.79 kg/m²   General: Alert, pleasant, NAD  HEENT: Normocephalic.    Psych:  Affect/mood is normal, judgement is good, memory is intact, grooming is appropriate.    Assessment/Plan:     Debby was seen today for hospital follow-up.    Diagnoses and all orders for this visit:    Hypocalcemia  Presumably resolved.  Records requested from [facility].  She gets routine lab work when she does dialysis.  She plans to talk to her nephrologist regarding this.    ESRD (end stage renal disease) on dialysis (HCC)  Stable, managed by nephrology  -     US-EXTREMITY ARTERY LOWER BILAT W/IHSAN (COMBO); Future    Chronic lupus nephritis (HCC)  Systemic lupus erythematosus, unspecified SLE type, unspecified organ involvement status (HCC)  Stable, managed by rheumatology  -     US-EXTREMITY ARTERY LOWER BILAT W/IHSAN (COMBO); Future    Fibromyalgia  Stable, managed by psychiatry.  Refills of her tizanidine and promethazine given to patient today.  -     promethazine (PHENERGAN) 25 MG Tab; Take 1 Tab by mouth every 8 hours as needed for Nausea/Vomiting.  -     tizanidine (ZANAFLEX) 4 MG Tab; Take 2 " Tabs by mouth every bedtime.    Claudication (HCC)  As she is describing claudication symptoms, will check ABIs  -     US-EXTREMITY ARTERY LOWER BILAT W/IHSAN (COMBO); Future    Other orders  -     traZODone (DESYREL) 50 MG Tab; Take 1 Tab by mouth every bedtime.        Return in about 6 months (around 10/4/2019) for routine follow up.

## 2019-04-04 NOTE — LETTER
[facility]  Sushila Manzano M.D.  [address] [facility] [address]  [place] [id number]  Fax: [phone]   Authorization for Release/Disclosure of   Protected Health Information   Name: DEBBY WARD : 1980 SSN: [id number]   Address: [address] [place] [address]  [place] [address] Phone:    [phone] (home)    I authorize the entity listed below to release/disclose the PHI below to:   [facility]/Sushila Manzano M.D. and Sushila Manzano M.D.   Provider or Entity Name:   [facility]   Address   [facility], [facility]   Phone:      Fax:     Reason for request: continuity of care   Information to be released:    [  ] LAST COLONOSCOPY,  including any PATH REPORT and follow-up  [  ] LAST FIT/COLOGUARD RESULT [  ] LAST DEXA  [  ] LAST MAMMOGRAM  [  ] LAST PAP  [  ] LAST LABS [  ] RETINA EXAM REPORT  [  ] IMMUNIZATION RECORDS  [x  ] Release records from recent hospitalization      [  ] Check here and initial the line next to each item to release ALL health information INCLUDING  _____ Care and treatment for drug and / or alcohol abuse  _____ HIV testing, infection status, or AIDS  _____ Genetic Testing    DATES OF SERVICE OR TIME PERIOD TO BE DISCLOSED: _____________  I understand and acknowledge that:  * This Authorization may be revoked at any time by you in writing, except if your health information has already been used or disclosed.  * Your health information that will be used or disclosed as a result of you signing this authorization could be re-disclosed by the recipient. If this occurs, your re-disclosed health information may no longer be protected by State or Federal laws.  * You may refuse to sign this Authorization. Your refusal will not affect your ability to obtain treatment.  * This Authorization becomes effective upon signing and will  on (date) __________.      If no date is indicated, this Authorization will  one (1) year from the signature date.    Name: Debby Mustafa  All    Signature:   Date:     4/4/2019       PLEASE FAX REQUESTED RECORDS BACK TO: [phone]

## 2019-04-10 ENCOUNTER — TELEPHONE (OUTPATIENT)
Dept: PHYSICAL MEDICINE AND REHAB | Facility: MEDICAL CENTER | Age: 39
End: 2019-04-10

## 2019-04-10 DIAGNOSIS — M79.7 FIBROMYALGIA: ICD-10-CM

## 2019-04-10 DIAGNOSIS — M87.052 AVASCULAR NECROSIS OF BONES OF BOTH HIPS (HCC): ICD-10-CM

## 2019-04-10 DIAGNOSIS — G62.9 PERIPHERAL POLYNEUROPATHY: ICD-10-CM

## 2019-04-10 DIAGNOSIS — M54.50 CHRONIC BILATERAL LOW BACK PAIN WITHOUT SCIATICA: ICD-10-CM

## 2019-04-10 DIAGNOSIS — G89.29 CHRONIC BILATERAL LOW BACK PAIN WITHOUT SCIATICA: ICD-10-CM

## 2019-04-10 DIAGNOSIS — M87.051 AVASCULAR NECROSIS OF BONES OF BOTH HIPS (HCC): ICD-10-CM

## 2019-04-10 RX ORDER — OXYCODONE HYDROCHLORIDE 20 MG/1
TABLET ORAL
Qty: 24 TAB | Refills: 0 | Status: SHIPPED | OUTPATIENT
Start: 2019-04-11 | End: 2019-04-16 | Stop reason: SDUPTHER

## 2019-04-10 RX ORDER — PREGABALIN 75 MG/1
75 CAPSULE ORAL 3 TIMES DAILY
Qty: 21 CAP | Refills: 0 | Status: SHIPPED | OUTPATIENT
Start: 2019-04-10 | End: 2019-04-16 | Stop reason: SDUPTHER

## 2019-04-16 ENCOUNTER — OFFICE VISIT (OUTPATIENT)
Dept: PHYSICAL MEDICINE AND REHAB | Facility: MEDICAL CENTER | Age: 39
End: 2019-04-16
Payer: MEDICARE

## 2019-04-16 ENCOUNTER — HOSPITAL ENCOUNTER (OUTPATIENT)
Dept: RADIOLOGY | Facility: MEDICAL CENTER | Age: 39
End: 2019-04-16
Attending: FAMILY MEDICINE
Payer: MEDICARE

## 2019-04-16 VITALS
OXYGEN SATURATION: 92 % | DIASTOLIC BLOOD PRESSURE: 76 MMHG | TEMPERATURE: 98.6 F | HEART RATE: 90 BPM | WEIGHT: 182.1 LBS | BODY MASS INDEX: 30.34 KG/M2 | SYSTOLIC BLOOD PRESSURE: 128 MMHG | HEIGHT: 65 IN

## 2019-04-16 DIAGNOSIS — K59.03 THERAPEUTIC OPIOID INDUCED CONSTIPATION: ICD-10-CM

## 2019-04-16 DIAGNOSIS — N18.6 ESRD (END STAGE RENAL DISEASE) ON DIALYSIS (HCC): ICD-10-CM

## 2019-04-16 DIAGNOSIS — M32.14: ICD-10-CM

## 2019-04-16 DIAGNOSIS — M54.6 THORACIC SPINE PAIN: ICD-10-CM

## 2019-04-16 DIAGNOSIS — Z99.2 ESRD (END STAGE RENAL DISEASE) ON DIALYSIS (HCC): ICD-10-CM

## 2019-04-16 DIAGNOSIS — M87.052 AVASCULAR NECROSIS OF BONES OF BOTH HIPS (HCC): ICD-10-CM

## 2019-04-16 DIAGNOSIS — M54.41 MIDLINE LOW BACK PAIN WITH RIGHT-SIDED SCIATICA, UNSPECIFIED CHRONICITY: ICD-10-CM

## 2019-04-16 DIAGNOSIS — I73.9 CLAUDICATION (HCC): ICD-10-CM

## 2019-04-16 DIAGNOSIS — T40.2X5A THERAPEUTIC OPIOID INDUCED CONSTIPATION: ICD-10-CM

## 2019-04-16 DIAGNOSIS — G62.9 PERIPHERAL POLYNEUROPATHY: ICD-10-CM

## 2019-04-16 DIAGNOSIS — M54.50 CHRONIC BILATERAL LOW BACK PAIN WITHOUT SCIATICA: ICD-10-CM

## 2019-04-16 DIAGNOSIS — M87.051 AVASCULAR NECROSIS OF BONES OF BOTH HIPS (HCC): ICD-10-CM

## 2019-04-16 DIAGNOSIS — G89.29 CHRONIC BILATERAL LOW BACK PAIN WITHOUT SCIATICA: ICD-10-CM

## 2019-04-16 DIAGNOSIS — M79.7 FIBROMYALGIA: ICD-10-CM

## 2019-04-16 PROCEDURE — 99214 OFFICE O/P EST MOD 30 MIN: CPT | Performed by: PHYSICAL MEDICINE & REHABILITATION

## 2019-04-16 PROCEDURE — 93922 UPR/L XTREMITY ART 2 LEVELS: CPT | Mod: 26 | Performed by: SURGERY

## 2019-04-16 PROCEDURE — 93922 UPR/L XTREMITY ART 2 LEVELS: CPT

## 2019-04-16 RX ORDER — OXYCODONE HYDROCHLORIDE 20 MG/1
TABLET ORAL
Qty: 96 TAB | Refills: 0 | Status: SHIPPED | OUTPATIENT
Start: 2019-04-16 | End: 2019-05-16 | Stop reason: SDUPTHER

## 2019-04-16 RX ORDER — PREGABALIN 75 MG/1
75 CAPSULE ORAL 3 TIMES DAILY
Qty: 84 CAP | Refills: 0 | Status: SHIPPED | OUTPATIENT
Start: 2019-04-16 | End: 2019-05-14

## 2019-04-16 ASSESSMENT — PAIN SCALES - GENERAL: PAINLEVEL: 8=MODERATE-SEVERE PAIN

## 2019-04-16 ASSESSMENT — PATIENT HEALTH QUESTIONNAIRE - PHQ9
CLINICAL INTERPRETATION OF PHQ2 SCORE: 1
SUM OF ALL RESPONSES TO PHQ QUESTIONS 1-9: 10
5. POOR APPETITE OR OVEREATING: 3 - NEARLY EVERY DAY

## 2019-04-16 NOTE — PROGRESS NOTES
Follow up patient note  Pain Medicine, Interventional spine and sports physiatry, Physical medicine rehabilitation      Chief complaint:   Cervicalgia, hips and knees, bilateral leg pain      HISTORY      HPI  Patient identification/Interval histotry:     Debby Carrizales 38 y.o. female who has chronic pain related to rheumatologic disease, peripheral neuropathy and AVN hips, lupus.       She reports that she has broken her left fifth metatarsal.  She reports that she has followed with ortho and is told that she will be out of her walking shoe soon.     Since the last visit, she was hospitalized for hypocalcemia and worsened back pain at [place].  She reports that her calcium was very low.  This was replaced and she reports that she is getting back to her baseline.  She has had a visit with her Nephrologist and is taking more tums now.    The lyrica seems to be helping with her burning pain.  It is significantly better with the lyrica than without it. Still, she has burning pain in her feet and is going to have a vascular study done soon.    No side effects from oxycodone, mild constipation is managed with colace and a miralax equivalent.  She is having regular bowel movements on a daily basis.  The oxycodone helps control her pain.         ROS   Unchanged from 02/14/2019 except as noted in HPI related to hospitalization    Red Flags :   Chills, Sweats:No fevers, chills and night sweats.  Involuntary Weight Loss: Denies  Bowel/Bladder Incontinence: Denies  Saddle Anesthesia: Denies    PMHx:   Past Medical History:   Diagnosis Date   • Arthritis     all joints,r/t lupus   • Avascular necrosis of bones of both hips (HCC) 10/10/2016   • Clostridium difficile colitis 5/3/2011   • Dialysis patient    • ESBL (extended spectrum beta-lactamase) producing bacteria infection 8/25/2014   • Fibromyalgia    • Hypertension    • Lupus    • Pneumonia    • Psychiatric disorder     anxiety, depression   •  Pyelonephritis 11/21/2017   • Renal failure    • Sepsis due to pneumonia ([facility]) 6/7/2018   • Status epilepticus ([facility]) 12/13/2018       PSHx:   Past Surgical History:   Procedure Laterality Date   • AV FISTULA REVISION Right 8/11/2018    Procedure: AV FISTULA REVISION- Graft and Thrombectomy;  Surgeon: Shabbir Ardon M.D.;  Location: [facility];  Service: General   • AV FISTULA THROMBOLYSIS Right 8/11/2018    Procedure: AV FISTULA THROMBOLYSIS;  Surgeon: Shabbir Ardon M.D.;  Location: SURGERY [facility];  Service: General   • AV FISTULA CREATION Right 12/9/2017    Procedure: AV FISTULA CREATION-ARM FOR GRAFT;  Surgeon: Lesly Jansen M.D.;  Location: [facility];  Service: General   • THROMBECTOMY  12/9/2017    Procedure: THROMBECTOMY;  Surgeon: Lesly Jansen M.D.;  Location: [facility];  Service: General   • THROMBECTOMY Right 8/21/2016    Procedure: THROMBECTOMY - right AV fistula graft with grams;  Surgeon: Shabbir Ardon M.D.;  Location: [facility];  Service:    • ANGIOPLASTY BALLOON  8/21/2016    Procedure: ANGIOPLASTY BALLOON;  Surgeon: Shabbir Ardon M.D.;  Location: [facility];  Service:    • THROMBECTOMY Right 8/20/2016    Procedure: THROMBECTOMY AV GRAFT;  Surgeon: Shabbir Ardon M.D.;  Location: [facility];  Service:    • AV FISTULA CREATION Right 7/12/2016    Procedure: AV FISTULA CREATION WITH GRAFT BRACHIAL AXILLARY;  Surgeon: Shabbir Ardon M.D.;  Location: SURGERY [facility];  Service:    • CLOSED REDUCTION Right 7/5/2016    Procedure: CLOSED REDUCTION- Hip ;  Surgeon: Michael Holman M.D.;  Location: [facility];  Service:    • HIP ARTHROPLASTY TOTAL Right 1/18/2016    Procedure: HIP ARTHROPLASTY TOTAL;  Surgeon: Michael Holman M.D.;  Location: [facility];  Service:    • AV FISTULA CREATION  2/3/2015    Performed by Shabbir Ardon M.D. at [facility]    • AV FISTULA CREATION  11/14/2014    [place] [facility] [place]   • [place] FISTULA CREATION  9/9/2014    [place] [facility] [place]   • CATH PLACEMENT  9/9/2014    [place] [facility] [place]   • OTHER  5/2011    tracheostomy   • GASTROSCOPY-ENDO  4/27/2011    Performed by PALMIRA DOAN at ENDOSCOPY [facility]   • COLONOSCOPY WITH BIOPSY  4/20/2011    Performed by ALCIRA CRUZ at ENDOSCOPY [facility]   • GASTROSCOPY-ENDO  4/18/2011    Performed by ALCIRA CRUZ at ENDOSCOPY [facility]   • GASTROSCOPY-ENDO  4/10/2011    Performed by SYED JURADO at ENDOSCOPY [facility]   • SCLEROTHERAPHY  4/10/2011    Performed by SYED JURADO at ENDOSCOPY [facility]   • OTHER ABDOMINAL SURGERY      kidney biopsy   • TRACHEOSTOMY         Family history   Denies neuromuscular disease  Family History   Problem Relation Age of Onset   • Heart Disease Mother    • Cancer Father        Medications:   Outpatient Prescriptions Marked as Taking for the 4/16/19 encounter (Office Visit) with Quinn Chandler M.D.   Medication Sig Dispense Refill   • pregabalin (LYRICA) 75 MG Cap Take 1 Cap by mouth 3 times a day for 28 days. 84 Cap 0   • Oxycodone HCl 20 MG Tab Take 1 Tab by mouth every 6 hours as needed. (Take up to three a day as needed and one additional pill after dialysis (3 times a week)) for 28 days 96 Tab 0   • promethazine (PHENERGAN) 25 MG Tab Take 1 Tab by mouth every 8 hours as needed for Nausea/Vomiting. 60 Tab 3   • traZODone (DESYREL) 50 MG Tab Take 1 Tab by mouth every bedtime. 90 Tab 3   • tizanidine (ZANAFLEX) 4 MG Tab Take 2 Tabs by mouth every bedtime. 180 Tab 3   • lacosamide (VIMPAT) 100 MG Tab tablet Take 1 Tab by mouth 2 Times a Day for 90 days. 180 Tab 1   • lidocaine (LIDODERM) 5 % Patch Apply 1 Patch to skin as directed every 24 hours. 10 Patch 1   •  "omeprazole (PRILOSEC) 20 MG delayed-release capsule Take 1 Cap by mouth every day. 30 Cap 3   • sevelamer carbonate (RENVELA) 800 MG Tab tablet Take 2,400 mg by mouth 3 times a day, with meals.     • ferrous sulfate 325 (65 FE) MG tablet Take 325 mg by mouth every day.     • ascorbic acid (ASCORBIC ACID) 500 MG Tab Take 500 mg by mouth every day.     • hydroxychloroquine (PLAQUENIL) 200 MG Tab Take 200 mg by mouth every bedtime.     • cholecalciferol (VITAMIN D3) 5000 UNIT Cap Take 10,000 Units by mouth every day.         Allergies:   Allergies   Allergen Reactions   • Lorazepam [Ativan] Anxiety     Patient becomes severely paranoid and agitated   • Morphine Itching     Tolerates Dilaudid   • Seasonal Runny Nose and Itching     Hay fever, sabiha brush       Social Hx:   Social History     Social History   • Marital status: Single     Spouse name: N/A   • Number of children: N/A   • Years of education: N/A     Occupational History   • Not on file.     Social History Main Topics   • Smoking status: Former Smoker     Packs/day: 0.50     Years: 18.00     Types: Cigarettes     Quit date: 6/13/2011   • Smokeless tobacco: Never Used      Comment: 1/2 ppd   • Alcohol use No   • Drug use: No   • Sexual activity: Not on file     Other Topics Concern   •  Service No   • Blood Transfusions Yes   • Caffeine Concern No   • Occupational Exposure No   • Hobby Hazards No   • Sleep Concern Yes   • Stress Concern Yes   • Weight Concern Yes   • Special Diet Yes   • Back Care No   • Exercise Yes   • Bike Helmet No   • Seat Belt Yes   • Self-Exams Yes     Social History Narrative   • No narrative on file       EXAMINATION     Physical Exam:   Vitals: /76 (BP Location: Left arm, Patient Position: Sitting, BP Cuff Size: Adult long)   Pulse 90   Temp 37 °C (98.6 °F) (Temporal)   Ht 1.651 m (5' 5\")   Wt 82.6 kg (182 lb 1.6 oz)   SpO2 92%     Constitutional:   Body Habitus: Body mass index is 30.3 kg/m².  Cooperation: Fully " cooperates with exam  Appearance: Well-groomed no disheveled, in no acute distress  Respiratory-  breathing comfortable on room air, no wheezing.  Psychiatric- alert and oriented ×3. Normal affect.   Spine:   Gait slow, steady without loss of balance.  No use of assist device.  Walking shoe on the left foot      MEDICAL DECISION MAKING    DATA    Labs:     UDS:   01/17/2019 Oxycodone and metabolites as expected    01/09/2019: Hep B surface ag negative  01/08/2019: GFR 7; BUN 23 Cr 6.68, Plt 160    12/15/2018 CRP 3.03    BMP 12/16/2018  Na 136, Potassium 4.4, chloride 96, CO2 23 Glucose 83, BUN 55H, Cr 9.44H, Calcium 9.9, Anion gap 17.0H    CBC 3.3, Hb 9.6, Hct 31.3, Plt 115    Lab Results   Component Value Date/Time    HBA1C 4.9 06/07/2018 02:29 AM        Imaging:   I reviewed the following radiology reports reviewed  Xray left foot 01/21/2019  Nondisplaced transverse fracture through the base of the fifth metatarsal.    MRI lumbar spine 1/1/19  1.  Diffusely decreased signal again seen throughout the marrow space consistent with red marrow conversion, likely secondary to chronic anemia.  2.  No significant disc bulging, central canal narrowing, or foraminal narrowing.  3.  No lumbar spine fracture or subluxation.    MRI brain 12/13/2018  1.  There is no hippocampal sclerosis.  2.  There is no evidence of cortical dysplasia or neuronal migrational abnormalities.  3.  A few nonspecific T2 hyperintensities in the supratentorial white matter likely representing nonspecific foci of gliosis, chronic ischemia and demyelination. This is unchanged since the previous MRI dated 2/23/2012.       Chest xray 06/07/2018: Minimal right-sided opacities as described above, suggesting pneumonia.               Results for orders placed during the hospital encounter of 07/19/17   MR-LUMBAR SPINE-W/O    Impression 1.  Diffuse decreased bone marrow signal intensity throughout the lumbar spine and sacrum, presumably secondary to chronic  anemia.    2.  Otherwise unremarkable MRI scan of the lumbar spine without contrast.                                    DIAGNOSIS   Visit Diagnoses     ICD-10-CM   1. Peripheral polyneuropathy ([facility]) G62.9   2. Avascular necrosis of bones of both hips ([facility]) M87.051    M87.052   3. Fibromyalgia M79.7   4. ESRD (end stage renal disease) on dialysis ([facility]) N18.6    Z99.2   5. Therapeutic opioid induced constipation K59.03    T40.2X5A   6. Midline low back pain with right-sided sciatica, unspecified chronicity M54.41   7. Thoracic spine pain M54.6   8. Chronic bilateral low back pain without sciatica M54.5    G89.29         ASSESSMENT and PLAN:     Debby Carrizales 37 y.o. Female returns for follow-up of her chronic pain related to lupus, AVN hips, low back and peripheral neuropathy    Debby was seen today for follow-up.    Diagnoses and all orders for this visit:    Peripheral polyneuropathy ([facility])  -     pregabalin (LYRICA) 75 MG Cap; Take 1 Cap by mouth 3 times a day for 28 days.  -     Oxycodone HCl 20 MG Tab; Take 1 Tab by mouth every 6 hours as needed. (Take up to three a day as needed and one additional pill after dialysis (3 times a week)) for 28 days    Avascular necrosis of bones of both hips ([facility])  -     Oxycodone HCl 20 MG Tab; Take 1 Tab by mouth every 6 hours as needed. (Take up to three a day as needed and one additional pill after dialysis (3 times a week)) for 28 days    Fibromyalgia  -     Oxycodone HCl 20 MG Tab; Take 1 Tab by mouth every 6 hours as needed. (Take up to three a day as needed and one additional pill after dialysis (3 times a week)) for 28 days    ESRD (end stage renal disease) on dialysis ([facility])  -     Pain Management Screen    Therapeutic opioid induced constipation  -     Pain Management Screen    Midline low back pain with right-sided sciatica, unspecified chronicity    Thoracic spine pain    Chronic bilateral low back pain without sciatica  -     Oxycodone HCl 20 MG Tab; Take 1  "Tab by mouth every 6 hours as needed. (Take up to three a day as needed and one additional pill after dialysis (3 times a week)) for 28 days         Continue current schedule of oxycodone.      UDS ordered today.    Expect that her hypocalcemia contributed to her pain.  She will continue to follow-up with PCP and Nephrology.    Continue lyrica 75mg po tid.  Refills given today.    Follow-up with orthopedics as referred regarding the left fifth metatarsal fracture.    In prescribing controlled substances to this patient, I certify that I have obtained and reviewed the medical history of Debby Carrizales. I have also made a good ly effort to obtain applicable records from other providers who have treated the patient and records demonstrating the following: report of \"drug-seeking behavior,\" although I suspect that she has organic reasons for pain and will continue to monitor as appropriate.     I have conducted a physical exam and documented it. I have reviewed Ms. Carrizales’s prescription history as maintained by the [place] Prescription Monitoring Program.     I have assessed the patient’s risk for abuse, dependency, and addiction using the validated Opioid Risk Tool available at https://www.mdcalc.com/gmzaja-nlhg-oios-ort-narcotic-abuse.     Given the above, I believe the benefits of controlled substance therapy outweigh the risks. The reasons for prescribing controlled substances include non-narcotic, oral analgesic alternatives have been inadequate for pain control and in my professional opinion, controlled substances are the only reasonable choice for this patient because she has limitations to medication choices due to being on dialysis.   Accordingly, I have discussed the risk and benefits, treatment plan, and alternative therapies with the patient.       Follow up: 4 weeks       Please note that this dictation was created using voice recognition software. I have made every reasonable attempt to " correct obvious errors but there may be errors of grammar and content that I may have overlooked prior to finalization of this note.      Quinn Chandler MD  Interventional Spine and Sports Physiatry  Physical Medicine and Rehabilitation  [facility]

## 2019-05-03 ENCOUNTER — HOSPITAL ENCOUNTER (INPATIENT)
Dept: HOSPITAL 8 - ED | Age: 39
LOS: 1 days | Discharge: HOME HEALTH | DRG: 917 | End: 2019-05-04
Attending: HOSPITALIST | Admitting: HOSPITALIST
Payer: MEDICARE

## 2019-05-03 VITALS — SYSTOLIC BLOOD PRESSURE: 143 MMHG | DIASTOLIC BLOOD PRESSURE: 81 MMHG

## 2019-05-03 VITALS — DIASTOLIC BLOOD PRESSURE: 85 MMHG | SYSTOLIC BLOOD PRESSURE: 142 MMHG

## 2019-05-03 VITALS — HEIGHT: 65 IN | BODY MASS INDEX: 28.8 KG/M2 | WEIGHT: 172.84 LBS

## 2019-05-03 VITALS — DIASTOLIC BLOOD PRESSURE: 83 MMHG | SYSTOLIC BLOOD PRESSURE: 134 MMHG

## 2019-05-03 DIAGNOSIS — Z88.8: ICD-10-CM

## 2019-05-03 DIAGNOSIS — I12.0: ICD-10-CM
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
## 2019-05-03 DIAGNOSIS — Z80.42: ICD-10-CM

## 2019-05-03 DIAGNOSIS — Z91.15: ICD-10-CM
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
## 2019-05-03 DIAGNOSIS — D63.1: ICD-10-CM

## 2019-05-03 DIAGNOSIS — M32.9: ICD-10-CM

## 2019-05-03 DIAGNOSIS — F17.200: ICD-10-CM

## 2019-05-03 DIAGNOSIS — M79.7: ICD-10-CM

## 2019-05-03 DIAGNOSIS — T42.6X1A: Primary | ICD-10-CM

## 2019-05-03 DIAGNOSIS — N39.0: ICD-10-CM

## 2019-05-03 DIAGNOSIS — R04.0: ICD-10-CM

## 2019-05-03 DIAGNOSIS — G40.909: ICD-10-CM

## 2019-05-03 DIAGNOSIS — S70.01XA: ICD-10-CM

## 2019-05-03 DIAGNOSIS — Z99.2: ICD-10-CM

## 2019-05-03 DIAGNOSIS — N18.6: ICD-10-CM

## 2019-05-03 DIAGNOSIS — Z96.641: ICD-10-CM
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
## 2019-05-03 DIAGNOSIS — N17.9: ICD-10-CM

## 2019-05-03 LAB
ALBUMIN SERPL-MCNC: 3.4 G/DL (ref 3.4–5)
ALP SERPL-CCNC: 111 U/L (ref 45–117)
ALT SERPL-CCNC: 10 U/L (ref 12–78)
ANION GAP SERPL CALC-SCNC: 17 MMOL/L (ref 5–15)
APTT BLD: 39 SECONDS (ref 25–31)
BASOPHILS # BLD AUTO: 0.05 X10^3/UL (ref 0–0.1)
BASOPHILS NFR BLD AUTO: 1 % (ref 0–1)
BILIRUB SERPL-MCNC: 0.4 MG/DL (ref 0.2–1)
CALCIUM SERPL-MCNC: 8 MG/DL (ref 8.5–10.1)
CHLORIDE SERPL-SCNC: 102 MMOL/L (ref 98–107)
CREAT SERPL-MCNC: 22.6 MG/DL (ref 0.55–1.02)
CULTURE INDICATED?: YES
EOSINOPHIL # BLD AUTO: 0.35 X10^3/UL (ref 0–0.4)
EOSINOPHIL NFR BLD AUTO: 5 % (ref 1–7)
ERYTHROCYTE [DISTWIDTH] IN BLOOD BY AUTOMATED COUNT: 13.2 % (ref 9.6–15.2)
INR PPP: 0.95 (ref 0.93–1.1)
LYMPHOCYTES # BLD AUTO: 0.86 X10^3/UL (ref 1–3.4)
LYMPHOCYTES NFR BLD AUTO: 12 % (ref 22–44)
MCH RBC QN AUTO: 30.9 PG (ref 27–34.8)
MCHC RBC AUTO-ENTMCNC: 34 G/DL (ref 32.4–35.8)
MCV RBC AUTO: 90.8 FL (ref 80–100)
MD: NO
MICROSCOPIC: (no result)
MONOCYTES # BLD AUTO: 0.5 X10^3/UL (ref 0.2–0.8)
MONOCYTES NFR BLD AUTO: 7 % (ref 2–9)
NEUTROPHILS # BLD AUTO: 5.18 X10^3/UL (ref 1.8–6.8)
NEUTROPHILS NFR BLD AUTO: 75 % (ref 42–75)
PLATELET # BLD AUTO: 152 X10^3/UL (ref 130–400)
PMV BLD AUTO: 8.4 FL (ref 7.4–10.4)
PROT SERPL-MCNC: 7.7 G/DL (ref 6.4–8.2)
PROTHROMBIN TIME: 10 SECONDS (ref 9.6–11.5)
RBC # BLD AUTO: 3.6 X10^6/UL (ref 3.82–5.3)
T4 FREE SERPL-MCNC: 0.83 NG/DL (ref 0.76–1.46)
TSH SERPL-ACNC: 2.1 MIU/L (ref 0.36–3.74)

## 2019-05-03 PROCEDURE — 36415 COLL VENOUS BLD VENIPUNCTURE: CPT

## 2019-05-03 PROCEDURE — 70450 CT HEAD/BRAIN W/O DYE: CPT

## 2019-05-03 PROCEDURE — 96374 THER/PROPH/DIAG INJ IV PUSH: CPT

## 2019-05-03 PROCEDURE — G0378 HOSPITAL OBSERVATION PER HR: HCPCS

## 2019-05-03 PROCEDURE — 83735 ASSAY OF MAGNESIUM: CPT

## 2019-05-03 PROCEDURE — 84703 CHORIONIC GONADOTROPIN ASSAY: CPT

## 2019-05-03 PROCEDURE — 85730 THROMBOPLASTIN TIME PARTIAL: CPT

## 2019-05-03 PROCEDURE — 87040 BLOOD CULTURE FOR BACTERIA: CPT

## 2019-05-03 PROCEDURE — 84100 ASSAY OF PHOSPHORUS: CPT

## 2019-05-03 PROCEDURE — 81001 URINALYSIS AUTO W/SCOPE: CPT

## 2019-05-03 PROCEDURE — 84443 ASSAY THYROID STIM HORMONE: CPT

## 2019-05-03 PROCEDURE — 87340 HEPATITIS B SURFACE AG IA: CPT

## 2019-05-03 PROCEDURE — 84439 ASSAY OF FREE THYROXINE: CPT

## 2019-05-03 PROCEDURE — 5A1D70Z PERFORMANCE OF URINARY FILTRATION, INTERMITTENT, LESS THAN 6 HOURS PER DAY: ICD-10-PCS | Performed by: HOSPITALIST

## 2019-05-03 PROCEDURE — 87086 URINE CULTURE/COLONY COUNT: CPT

## 2019-05-03 PROCEDURE — 85610 PROTHROMBIN TIME: CPT

## 2019-05-03 PROCEDURE — 86705 HEP B CORE ANTIBODY IGM: CPT

## 2019-05-03 PROCEDURE — 85025 COMPLETE CBC W/AUTO DIFF WBC: CPT

## 2019-05-03 PROCEDURE — 80053 COMPREHEN METABOLIC PANEL: CPT

## 2019-05-03 PROCEDURE — 86706 HEP B SURFACE ANTIBODY: CPT

## 2019-05-03 RX ADMIN — PROMETHAZINE HYDROCHLORIDE PRN MG: 25 TABLET ORAL at 21:21

## 2019-05-03 RX ADMIN — OXYCODONE HYDROCHLORIDE PRN MG: 5 TABLET ORAL at 21:21

## 2019-05-03 RX ADMIN — LACOSAMIDE SCH MG: 50 TABLET, FILM COATED ORAL at 22:26

## 2019-05-03 RX ADMIN — OXYCODONE HYDROCHLORIDE PRN MG: 5 TABLET ORAL at 13:18

## 2019-05-03 RX ADMIN — PROMETHAZINE HYDROCHLORIDE PRN MG: 25 TABLET ORAL at 14:04

## 2019-05-03 RX ADMIN — CEFTRIAXONE SCH MLS/HR: 1 INJECTION, SOLUTION INTRAVENOUS at 12:08

## 2019-05-03 RX ADMIN — SEVELAMER CARBONATE SCH MG: 800 TABLET, FILM COATED ORAL at 17:41

## 2019-05-04 VITALS — SYSTOLIC BLOOD PRESSURE: 135 MMHG | DIASTOLIC BLOOD PRESSURE: 84 MMHG

## 2019-05-04 VITALS — DIASTOLIC BLOOD PRESSURE: 87 MMHG | SYSTOLIC BLOOD PRESSURE: 157 MMHG

## 2019-05-04 VITALS — DIASTOLIC BLOOD PRESSURE: 94 MMHG | SYSTOLIC BLOOD PRESSURE: 148 MMHG

## 2019-05-04 LAB
ALBUMIN SERPL-MCNC: 3.3 G/DL (ref 3.4–5)
ALP SERPL-CCNC: 107 U/L (ref 45–117)
ALT SERPL-CCNC: 14 U/L (ref 12–78)
ANION GAP SERPL CALC-SCNC: 9 MMOL/L (ref 5–15)
BASOPHILS # BLD AUTO: 0.02 X10^3/UL (ref 0–0.1)
BASOPHILS # BLD AUTO: 0.04 X10^3/UL (ref 0–0.1)
BASOPHILS NFR BLD AUTO: 1 % (ref 0–1)
BASOPHILS NFR BLD AUTO: 1 % (ref 0–1)
BILIRUB SERPL-MCNC: 0.7 MG/DL (ref 0.2–1)
CALCIUM SERPL-MCNC: 8.7 MG/DL (ref 8.5–10.1)
CHLORIDE SERPL-SCNC: 100 MMOL/L (ref 98–107)
CREAT SERPL-MCNC: 10.7 MG/DL (ref 0.55–1.02)
EOSINOPHIL # BLD AUTO: 0.14 X10^3/UL (ref 0–0.4)
EOSINOPHIL # BLD AUTO: 0.17 X10^3/UL (ref 0–0.4)
EOSINOPHIL NFR BLD AUTO: 3 % (ref 1–7)
EOSINOPHIL NFR BLD AUTO: 6 % (ref 1–7)
ERYTHROCYTE [DISTWIDTH] IN BLOOD BY AUTOMATED COUNT: 13.1 % (ref 9.6–15.2)
ERYTHROCYTE [DISTWIDTH] IN BLOOD BY AUTOMATED COUNT: 13.7 % (ref 9.6–15.2)
LYMPHOCYTES # BLD AUTO: 0.74 X10^3/UL (ref 1–3.4)
LYMPHOCYTES # BLD AUTO: 0.88 X10^3/UL (ref 1–3.4)
LYMPHOCYTES NFR BLD AUTO: 17 % (ref 22–44)
LYMPHOCYTES NFR BLD AUTO: 29 % (ref 22–44)
MCH RBC QN AUTO: 31.1 PG (ref 27–34.8)
MCH RBC QN AUTO: 31.6 PG (ref 27–34.8)
MCHC RBC AUTO-ENTMCNC: 34.3 G/DL (ref 32.4–35.8)
MCHC RBC AUTO-ENTMCNC: 34.8 G/DL (ref 32.4–35.8)
MCV RBC AUTO: 90.7 FL (ref 80–100)
MCV RBC AUTO: 90.8 FL (ref 80–100)
MD: NO
MD: NO
MONOCYTES # BLD AUTO: 0.33 X10^3/UL (ref 0.2–0.8)
MONOCYTES # BLD AUTO: 0.48 X10^3/UL (ref 0.2–0.8)
MONOCYTES NFR BLD AUTO: 11 % (ref 2–9)
MONOCYTES NFR BLD AUTO: 11 % (ref 2–9)
NEUTROPHILS # BLD AUTO: 1.61 X10^3/UL (ref 1.8–6.8)
NEUTROPHILS # BLD AUTO: 3.02 X10^3/UL (ref 1.8–6.8)
NEUTROPHILS NFR BLD AUTO: 53 % (ref 42–75)
NEUTROPHILS NFR BLD AUTO: 68 % (ref 42–75)
PLATELET # BLD AUTO: 145 X10^3/UL (ref 130–400)
PLATELET # BLD AUTO: 154 X10^3/UL (ref 130–400)
PMV BLD AUTO: 8 FL (ref 7.4–10.4)
PMV BLD AUTO: 8.4 FL (ref 7.4–10.4)
PROT SERPL-MCNC: 7.6 G/DL (ref 6.4–8.2)
RBC # BLD AUTO: 3.63 X10^6/UL (ref 3.82–5.3)
RBC # BLD AUTO: 3.8 X10^6/UL (ref 3.82–5.3)

## 2019-05-04 PROCEDURE — 5A1D70Z PERFORMANCE OF URINARY FILTRATION, INTERMITTENT, LESS THAN 6 HOURS PER DAY: ICD-10-PCS | Performed by: HOSPITALIST

## 2019-05-04 RX ADMIN — OXYCODONE HYDROCHLORIDE PRN MG: 5 TABLET ORAL at 09:13

## 2019-05-04 RX ADMIN — SEVELAMER CARBONATE SCH MG: 800 TABLET, FILM COATED ORAL at 13:12

## 2019-05-04 RX ADMIN — PROMETHAZINE HYDROCHLORIDE PRN MG: 25 TABLET ORAL at 15:08

## 2019-05-04 RX ADMIN — OXYCODONE HYDROCHLORIDE PRN MG: 5 TABLET ORAL at 15:07

## 2019-05-04 RX ADMIN — LACOSAMIDE SCH MG: 50 TABLET, FILM COATED ORAL at 09:19

## 2019-05-04 RX ADMIN — SEVELAMER CARBONATE SCH MG: 800 TABLET, FILM COATED ORAL at 17:38

## 2019-05-04 RX ADMIN — OXYCODONE HYDROCHLORIDE PRN MG: 5 TABLET ORAL at 03:52

## 2019-05-04 RX ADMIN — CEFTRIAXONE SCH MLS/HR: 1 INJECTION, SOLUTION INTRAVENOUS at 13:12

## 2019-05-04 RX ADMIN — SEVELAMER CARBONATE SCH MG: 800 TABLET, FILM COATED ORAL at 09:18

## 2019-05-16 ENCOUNTER — OFFICE VISIT (OUTPATIENT)
Dept: PHYSICAL MEDICINE AND REHAB | Facility: MEDICAL CENTER | Age: 39
End: 2019-05-16
Payer: MEDICARE

## 2019-05-16 VITALS
HEIGHT: 65 IN | BODY MASS INDEX: 29.27 KG/M2 | TEMPERATURE: 98 F | HEART RATE: 83 BPM | WEIGHT: 175.71 LBS | SYSTOLIC BLOOD PRESSURE: 124 MMHG | DIASTOLIC BLOOD PRESSURE: 92 MMHG | OXYGEN SATURATION: 90 %

## 2019-05-16 DIAGNOSIS — M87.051 AVASCULAR NECROSIS OF BONES OF BOTH HIPS (HCC): ICD-10-CM

## 2019-05-16 DIAGNOSIS — M87.052 AVASCULAR NECROSIS OF BONES OF BOTH HIPS (HCC): ICD-10-CM

## 2019-05-16 DIAGNOSIS — M32.9 SYSTEMIC LUPUS ERYTHEMATOSUS, UNSPECIFIED SLE TYPE, UNSPECIFIED ORGAN INVOLVEMENT STATUS (HCC): ICD-10-CM

## 2019-05-16 DIAGNOSIS — Z99.2 ESRD (END STAGE RENAL DISEASE) ON DIALYSIS (HCC): ICD-10-CM

## 2019-05-16 DIAGNOSIS — G89.29 CHRONIC BILATERAL LOW BACK PAIN WITHOUT SCIATICA: ICD-10-CM

## 2019-05-16 DIAGNOSIS — M84.475D: ICD-10-CM

## 2019-05-16 DIAGNOSIS — S92.355D CLOSED NONDISPLACED FRACTURE OF FIFTH METATARSAL BONE OF LEFT FOOT WITH ROUTINE HEALING, SUBSEQUENT ENCOUNTER: ICD-10-CM

## 2019-05-16 DIAGNOSIS — E55.9 VITAMIN D DEFICIENCY DISEASE: ICD-10-CM

## 2019-05-16 DIAGNOSIS — G62.9 PERIPHERAL POLYNEUROPATHY: ICD-10-CM

## 2019-05-16 DIAGNOSIS — Z96.641 STATUS POST TOTAL REPLACEMENT OF RIGHT HIP: ICD-10-CM

## 2019-05-16 DIAGNOSIS — M79.7 FIBROMYALGIA: ICD-10-CM

## 2019-05-16 DIAGNOSIS — N18.6 ESRD (END STAGE RENAL DISEASE) ON DIALYSIS (HCC): ICD-10-CM

## 2019-05-16 DIAGNOSIS — M54.50 CHRONIC BILATERAL LOW BACK PAIN WITHOUT SCIATICA: ICD-10-CM

## 2019-05-16 PROCEDURE — 99214 OFFICE O/P EST MOD 30 MIN: CPT | Performed by: PHYSICAL MEDICINE & REHABILITATION

## 2019-05-16 RX ORDER — OXYCODONE HYDROCHLORIDE 20 MG/1
TABLET ORAL
COMMUNITY
End: 2019-06-13 | Stop reason: SDUPTHER

## 2019-05-16 RX ORDER — PREGABALIN 75 MG/1
75 CAPSULE ORAL 3 TIMES DAILY
COMMUNITY
End: 2019-05-16 | Stop reason: SDUPTHER

## 2019-05-16 RX ORDER — OXYCODONE HYDROCHLORIDE 20 MG/1
TABLET ORAL
Qty: 96 TAB | Refills: 0 | Status: SHIPPED | OUTPATIENT
Start: 2019-05-16 | End: 2019-05-22

## 2019-05-16 RX ORDER — PREGABALIN 75 MG/1
75 CAPSULE ORAL 3 TIMES DAILY
Qty: 90 CAP | Refills: 0 | Status: SHIPPED | OUTPATIENT
Start: 2019-05-16 | End: 2019-06-13 | Stop reason: SDUPTHER

## 2019-05-16 ASSESSMENT — PATIENT HEALTH QUESTIONNAIRE - PHQ9: CLINICAL INTERPRETATION OF PHQ2 SCORE: 0

## 2019-05-16 ASSESSMENT — PAIN SCALES - GENERAL: PAINLEVEL: 8=MODERATE-SEVERE PAIN

## 2019-05-16 NOTE — PROGRESS NOTES
Follow up patient note  Pain Medicine, Interventional spine and sports physiatry, Physical medicine rehabilitation      Chief complaint:   Cervicalgia, hips and knees, bilateral leg pain      HISTORY      HPI  Patient identification/Interval histotry:     Debby Carrizales 38 y.o. female who has chronic pain related to rheumatologic disease, peripheral neuropathy and AVN hips, lupus.       Since the last visit, she has been hospitalized at [facility].  We will request those records, but she reports that she was walking fine and has had a few falls.  At the time, she reports that she had a UTI, this was treated by IV abx.  She reports that she fell on her right hip again.  Unclear what work-up might have been done.  Both hips are now painful.  The right hip is painful in the groin.    Her pain does seem to be better with changes in position.  She has been using lidocaine patches on her low back.  The lyrica seems to be helping with her burning pain.  It is significantly better with the lyrica than without it. She is trying to decrease this dose and has been taking two pills some days, rather than 3.  It seems like her back is more painful, but she has been trying to reduce this still.  Still, she has burning pain in her feet and is going to have a vascular study done soon.    She reports that she has broken her left fifth metatarsal.  She reports that she has followed with ortho and is told that she will be out of her walking shoe soon.    From what she reports, she has not had recent bone density     She has had a visit with her Nephrologist, Dr. Najjar, and is taking more tums now.  She is on the  [facility] transplant list.    No side effects from oxycodone, mild constipation is managed with colace and a miralax equivalent.  She is having regular bowel movements on a daily basis.  The oxycodone helps control her pain.       ORT: 3  PHQ-9:0    ROS   Unchanged from 02/14/2019 except as noted in HPI related  to hospitalization    Red Flags :   Chills, Sweats:No fevers, chills and night sweats.  Involuntary Weight Loss: Denies  Bowel/Bladder Incontinence: Denies  Saddle Anesthesia: Denies    PMHx:   Past Medical History:   Diagnosis Date   • Arthritis     all joints,r/t lupus   • Avascular necrosis of bones of both hips ([facility]) 10/10/2016   • Clostridium difficile colitis 5/3/2011   • Dialysis patient    • ESBL (extended spectrum beta-lactamase) producing bacteria infection 8/25/2014   • Fibromyalgia    • Hypertension    • Lupus    • Pneumonia    • Psychiatric disorder     anxiety, depression   • Pyelonephritis 11/21/2017   • Renal failure    • Sepsis due to pneumonia ([facility]) 6/7/2018   • Status epilepticus ([facility]) 12/13/2018       PSHx:   Past Surgical History:   Procedure Laterality Date   • AV FISTULA REVISION Right 8/11/2018    Procedure: AV FISTULA REVISION- Graft and Thrombectomy;  Surgeon: Shabbir Ardon M.D.;  Location: [facility];  Service: General   • AV FISTULA THROMBOLYSIS Right 8/11/2018    Procedure: AV FISTULA THROMBOLYSIS;  Surgeon: Shabbir Ardon M.D.;  Location: [facility];  Service: General   • AV FISTULA CREATION Right 12/9/2017    Procedure: AV FISTULA CREATION-ARM FOR GRAFT;  Surgeon: Lesly Jansen M.D.;  Location: [facility];  Service: General   • THROMBECTOMY  12/9/2017    Procedure: THROMBECTOMY;  Surgeon: Lesly Jansen M.D.;  Location: [facility];  Service: General   • THROMBECTOMY Right 8/21/2016    Procedure: THROMBECTOMY - right AV fistula graft with grams;  Surgeon: Shabbir Ardon M.D.;  Location: [facility];  Service:    • ANGIOPLASTY BALLOON  8/21/2016    Procedure: ANGIOPLASTY BALLOON;  Surgeon: Shabbir Ardon M.D.;  Location: [facility];  Service:    • THROMBECTOMY Right 8/20/2016    Procedure: THROMBECTOMY AV GRAFT;  Surgeon: Shabbir Ardon M.D.;  Location: [facility];   Service:    • AV FISTULA CREATION Right 7/12/2016    Procedure: AV FISTULA CREATION WITH GRAFT BRACHIAL AXILLARY;  Surgeon: Shabbir Ardon M.D.;  Location: SURGERY [facility];  Service:    • CLOSED REDUCTION Right 7/5/2016    Procedure: CLOSED REDUCTION- Hip ;  Surgeon: Michael Holman M.D.;  Location: SURGERY [facility];  Service:    • HIP ARTHROPLASTY TOTAL Right 1/18/2016    Procedure: HIP ARTHROPLASTY TOTAL;  Surgeon: Michael Holman M.D.;  Location: SURGERY [facility];  Service:    • AV FISTULA CREATION  2/3/2015    Performed by Shabbir Ardon M.D. at SURGERY [facility]   • AV FISTULA CREATION  11/14/2014    Performed by Shabbir Ardon M.D. at [facility]   • AV FISTULA CREATION  9/9/2014    Performed by Shabbir Ardon M.D. at [facility]   • CATH PLACEMENT  9/9/2014    Performed by Shabbir Ardon M.D. at [facility]   • OTHER  5/2011    tracheostomy   • GASTROSCOPY-ENDO  4/27/2011    Performed by PALMIRA DOAN at ENDOSCOPY [facility]   • COLONOSCOPY WITH BIOPSY  4/20/2011    Performed by ALCIRA CRUZ at [facility]   • GASTROSCOPY-ENDO  4/18/2011    Performed by ALCIRA CRUZ at [facility]   • GASTROSCOPY-ENDO  4/10/2011    Performed by SYED JURADO at [facility]   • SCLEROTHERAPHY  4/10/2011    Performed by SYED JURADO at [facility]   • OTHER ABDOMINAL SURGERY      kidney biopsy   • TRACHEOSTOMY         Family history   Denies neuromuscular disease  Family History   Problem Relation Age of Onset   • Heart Disease Mother    • Cancer Father        Medications:   Outpatient Prescriptions Marked as Taking for the 5/16/19 encounter (Office Visit) with Quinn Chandler M.D.   Medication Sig Dispense Refill   • Oxycodone HCl 20 MG Tab Take  by mouth.     • Oxycodone HCl 20 MG Tab Take 1 Tab by mouth every 6 hours as needed. (Take up to three a day as  needed and one additional pill after dialysis (3 times a week)) for 28 days 96 Tab 0   • pregabalin (LYRICA) 75 MG Cap Take 1 Cap by mouth 3 times a day for 30 days. 90 Cap 0   • traZODone (DESYREL) 50 MG Tab Take 1 Tab by mouth every bedtime. 90 Tab 3   • tizanidine (ZANAFLEX) 4 MG Tab Take 2 Tabs by mouth every bedtime. 180 Tab 3   • lacosamide (VIMPAT) 100 MG Tab tablet Take 1 Tab by mouth 2 Times a Day for 90 days. 180 Tab 1   • lidocaine (LIDODERM) 5 % Patch Apply 1 Patch to skin as directed every 24 hours. 10 Patch 1   • omeprazole (PRILOSEC) 20 MG delayed-release capsule Take 1 Cap by mouth every day. 30 Cap 3   • sevelamer carbonate (RENVELA) 800 MG Tab tablet Take 2,400 mg by mouth 3 times a day, with meals.     • ferrous sulfate 325 (65 FE) MG tablet Take 325 mg by mouth every day.     • ascorbic acid (ASCORBIC ACID) 500 MG Tab Take 500 mg by mouth every day.     • hydroxychloroquine (PLAQUENIL) 200 MG Tab Take 200 mg by mouth every bedtime.     • cholecalciferol (VITAMIN D3) 5000 UNIT Cap Take 10,000 Units by mouth every day.         Allergies:   Allergies   Allergen Reactions   • Lorazepam [Ativan] Anxiety     Patient becomes severely paranoid and agitated   • Morphine Itching     Tolerates Dilaudid   • Seasonal Runny Nose and Itching     Hay fever, sabiha brush       Social Hx:   Social History     Social History   • Marital status: Single     Spouse name: N/A   • Number of children: N/A   • Years of education: N/A     Occupational History   • Not on file.     Social History Main Topics   • Smoking status: Former Smoker     Packs/day: 0.50     Years: 18.00     Types: Cigarettes     Quit date: 6/13/2011   • Smokeless tobacco: Never Used      Comment: 1/2 ppd   • Alcohol use No   • Drug use: No   • Sexual activity: Not on file     Other Topics Concern   •  Service No   • Blood Transfusions Yes   • Caffeine Concern No   • Occupational Exposure No   • Hobby Hazards No   • Sleep Concern Yes   • Stress  "Concern Yes   • Weight Concern Yes   • Special Diet Yes   • Back Care No   • Exercise Yes   • Bike Helmet No   • Seat Belt Yes   • Self-Exams Yes     Social History Narrative   • No narrative on file       EXAMINATION     Physical Exam:   Vitals: /92 (BP Location: Left arm, Patient Position: Sitting, BP Cuff Size: Adult long)   Pulse 83   Temp 36.7 °C (98 °F)   Ht 1.651 m (5' 5\")   Wt 79.7 kg (175 lb 11.3 oz)   SpO2 90%     Constitutional:   Body Habitus: Body mass index is 29.24 kg/m².  Cooperation: Fully cooperates with exam  Appearance: Well-groomed no disheveled, in no acute distress  Respiratory-  breathing comfortable on room air, no wheezing.  Psychiatric- alert and oriented ×3. Normal affect.   Spine:   No focal motor deficits in the lower extremities bilaterally.  Sensation is grossly intact to light touch in the lower extremities bilaterally. She does not report significant paresthesias today.  Reflexes 2+ patella and 1+ achilles bilaterally  Hip on the right is painful with SLR, no radicular symptoms.  Negative SLR on the left.  Hip ROM is painful with Doroteo's and anterior groin pain.  Negative Doroteo's on the left.  Gait slow, steady without loss of balance.  No use of assist device.  Walking shoe on the left foot      MEDICAL DECISION MAKING    DATA    Labs:     UDS:   04/16/2019 Positive for oxymorphone  01/17/2019 Oxycodone and metabolites as expected  10/02/2018 Oxycodone and metabolites, also fentanyl    01/09/2019: Hep B surface ag negative  01/08/2019: GFR 7; BUN 23 Cr 6.68, Plt 160    12/15/2018 CRP 3.03    BMP 12/16/2018  Na 136, Potassium 4.4, chloride 96, CO2 23 Glucose 83, BUN 55H, Cr 9.44H, Calcium 9.9, Anion gap 17.0H    CBC 3.3, Hb 9.6, Hct 31.3, Plt 115    Lab Results   Component Value Date/Time    HBA1C 4.9 06/07/2018 02:29 AM        Imaging:   I reviewed the following radiology reports reviewed  Xray left foot 01/21/2019  Nondisplaced transverse fracture through the base of " the fifth metatarsal.    MRI lumbar spine 1/1/19  1.  Diffusely decreased signal again seen throughout the marrow space consistent with red marrow conversion, likely secondary to chronic anemia.  2.  No significant disc bulging, central canal narrowing, or foraminal narrowing.  3.  No lumbar spine fracture or subluxation.    MRI brain 12/13/2018  1.  There is no hippocampal sclerosis.  2.  There is no evidence of cortical dysplasia or neuronal migrational abnormalities.  3.  A few nonspecific T2 hyperintensities in the supratentorial white matter likely representing nonspecific foci of gliosis, chronic ischemia and demyelination. This is unchanged since the previous MRI dated 2/23/2012.       Chest xray 06/07/2018: Minimal right-sided opacities as described above, suggesting pneumonia.               Results for orders placed during the hospital encounter of 07/19/17   MR-LUMBAR SPINE-W/O    Impression 1.  Diffuse decreased bone marrow signal intensity throughout the lumbar spine and sacrum, presumably secondary to chronic anemia.    2.  Otherwise unremarkable MRI scan of the lumbar spine without contrast.                                    DIAGNOSIS   Visit Diagnoses     ICD-10-CM   1. Closed nondisplaced fracture of fifth metatarsal bone of left foot with routine healing, subsequent encounter S92.355D   2. ESRD (end stage renal disease) on dialysis ([facility]) N18.6    Z99.2   3. Systemic lupus erythematosus, unspecified SLE type, unspecified organ involvement status ([facility]) M32.9   4. Vitamin D deficiency disease E55.9   5. Peripheral polyneuropathy ([facility]) G62.9   6. Avascular necrosis of bones of both hips ([facility]) M87.051    M87.052   7. Fibromyalgia M79.7   8. Chronic bilateral low back pain without sciatica M54.5    G89.29   9. Status post total replacement of right hip Z96.641   10. Pathological fracture of left foot with routine healing, unspecified pathological cause, subsequent encounter M84.475D         ASSESSMENT  and PLAN:     Debby Carrizales 38 y.o. Female returns for follow-up of her chronic pain related to lupus, AVN hips, low back and peripheral neuropathy    Diagnoses and all orders for this visit:    Closed nondisplaced fracture of fifth metatarsal bone of left foot with routine healing, subsequent encounter    ESRD (end stage renal disease) on dialysis ([facility])  -     DS-BONE DENSITY STUDY (DEXA); Future    Systemic lupus erythematosus, unspecified SLE type, unspecified organ involvement status ([facility])  -     DS-BONE DENSITY STUDY (DEXA); Future    Vitamin D deficiency disease  -     DS-BONE DENSITY STUDY (DEXA); Future    Peripheral polyneuropathy ([facility])  -     Oxycodone HCl 20 MG Tab; Take 1 Tab by mouth every 6 hours as needed. (Take up to three a day as needed and one additional pill after dialysis (3 times a week)) for 28 days    Avascular necrosis of bones of both hips ([facility])  -     Oxycodone HCl 20 MG Tab; Take 1 Tab by mouth every 6 hours as needed. (Take up to three a day as needed and one additional pill after dialysis (3 times a week)) for 28 days    Fibromyalgia  -     Oxycodone HCl 20 MG Tab; Take 1 Tab by mouth every 6 hours as needed. (Take up to three a day as needed and one additional pill after dialysis (3 times a week)) for 28 days  -     pregabalin (LYRICA) 75 MG Cap; Take 1 Cap by mouth 3 times a day for 30 days.    Chronic bilateral low back pain without sciatica  -     Oxycodone HCl 20 MG Tab; Take 1 Tab by mouth every 6 hours as needed. (Take up to three a day as needed and one additional pill after dialysis (3 times a week)) for 28 days    Status post total replacement of right hip  -     DX-HIP-COMPLETE - UNILATERAL 2+ RIGHT; Future    Pathological fracture of left foot with routine healing, unspecified pathological cause, subsequent encounter  -     DS-BONE DENSITY STUDY (DEXA); Future         Continue current schedule of oxycodone.  Will call and clarify script with [facility].  She had  "been given a script for a 7 day script that was written as 28 days, in error.  Script given today.    Will request records from most recent stay with [facility].      Continue lyrica 75mg po tid.  Refill given today.  We will discuss possible dose reduction depending on how she does this month.    Follow-up with orthopedics as referred regarding the left fifth metatarsal fracture.  Discussed getting bone density study.    Xray of the right hip ordered.  She had right total hip arthroplasty with Dr. Michael Holman and does report that she has had dislocation in the past.  This is not as severe, but plan xray given that she has had pain since fall.    In prescribing controlled substances to this patient, I certify that I have obtained and reviewed the medical history of Debby Carrizales. I have also made a good ly effort to obtain applicable records from other providers who have treated the patient and records demonstrating the following: report of \"drug-seeking behavior,\" although I suspect that she has organic reasons for pain and will continue to monitor as appropriate.     I have conducted a physical exam and documented it. I have reviewed Ms. Carrizales’s prescription history as maintained by the [place] Prescription Monitoring Program.     I have assessed the patient’s risk for abuse, dependency, and addiction using the validated Opioid Risk Tool available at https://www.mdcalc.com/fimuzn-pbvc-yzeg-ort-narcotic-abuse.     Given the above, I believe the benefits of controlled substance therapy outweigh the risks. The reasons for prescribing controlled substances include non-narcotic, oral analgesic alternatives have been inadequate for pain control and in my professional opinion, controlled substances are the only reasonable choice for this patient because she has limitations to medication choices due to being on dialysis.   Accordingly, I have discussed the risk and benefits, treatment plan, and alternative " therapies with the patient.       Follow up: 4 weeks       Please note that this dictation was created using voice recognition software. I have made every reasonable attempt to correct obvious errors but there may be errors of grammar and content that I may have overlooked prior to finalization of this note.      Quinn Chandler MD  Interventional Spine and Sports Physiatry  Physical Medicine and Rehabilitation  [facility]

## 2019-06-13 ENCOUNTER — OFFICE VISIT (OUTPATIENT)
Dept: PHYSICAL MEDICINE AND REHAB | Facility: MEDICAL CENTER | Age: 39
End: 2019-06-13
Payer: MEDICARE

## 2019-06-13 VITALS
SYSTOLIC BLOOD PRESSURE: 110 MMHG | OXYGEN SATURATION: 95 % | WEIGHT: 177.91 LBS | DIASTOLIC BLOOD PRESSURE: 68 MMHG | TEMPERATURE: 98.1 F | BODY MASS INDEX: 29.64 KG/M2 | HEART RATE: 84 BPM | HEIGHT: 65 IN

## 2019-06-13 DIAGNOSIS — N18.6 ESRD (END STAGE RENAL DISEASE) ON DIALYSIS (HCC): ICD-10-CM

## 2019-06-13 DIAGNOSIS — M87.051 AVASCULAR NECROSIS OF BONES OF BOTH HIPS (HCC): ICD-10-CM

## 2019-06-13 DIAGNOSIS — M79.7 FIBROMYALGIA: ICD-10-CM

## 2019-06-13 DIAGNOSIS — Z99.2 ESRD (END STAGE RENAL DISEASE) ON DIALYSIS (HCC): ICD-10-CM

## 2019-06-13 DIAGNOSIS — M87.052 AVASCULAR NECROSIS OF BONES OF BOTH HIPS (HCC): ICD-10-CM

## 2019-06-13 DIAGNOSIS — M32.9 SYSTEMIC LUPUS ERYTHEMATOSUS, UNSPECIFIED SLE TYPE, UNSPECIFIED ORGAN INVOLVEMENT STATUS (HCC): ICD-10-CM

## 2019-06-13 DIAGNOSIS — F11.90 CHRONIC, CONTINUOUS USE OF OPIOIDS: ICD-10-CM

## 2019-06-13 PROCEDURE — 99214 OFFICE O/P EST MOD 30 MIN: CPT | Performed by: PHYSICAL MEDICINE & REHABILITATION

## 2019-06-13 RX ORDER — PREGABALIN 75 MG/1
75 CAPSULE ORAL 3 TIMES DAILY
Qty: 90 CAP | Refills: 1 | Status: SHIPPED | OUTPATIENT
Start: 2019-06-13 | End: 2019-07-02 | Stop reason: SDUPTHER

## 2019-06-13 RX ORDER — OXYCODONE HYDROCHLORIDE 20 MG/1
TABLET ORAL
Qty: 82 TAB | Refills: 0 | Status: SHIPPED | OUTPATIENT
Start: 2019-06-13 | End: 2019-07-02 | Stop reason: SDUPTHER

## 2019-06-13 ASSESSMENT — PATIENT HEALTH QUESTIONNAIRE - PHQ9
5. POOR APPETITE OR OVEREATING: 1 - SEVERAL DAYS
CLINICAL INTERPRETATION OF PHQ2 SCORE: 1
SUM OF ALL RESPONSES TO PHQ QUESTIONS 1-9: 4

## 2019-06-13 ASSESSMENT — PAIN SCALES - GENERAL: PAINLEVEL: 8=MODERATE-SEVERE PAIN

## 2019-06-13 NOTE — PROGRESS NOTES
"Follow up patient note  Pain Medicine, Interventional spine and sports physiatry, Physical medicine rehabilitation      Chief complaint:   Cervicalgia, hips and knees, bilateral leg pain      HISTORY      HPI  Patient identification/Interval histotry:     Debby Carrizales 38 y.o. female who has chronic pain related to rheumatologic disease, peripheral neuropathy and AVN hips, lupus.       She reports that she has not been able to get xrays of her right hip.  She is planning within the week.  This continues to be significantly aching.  This pain seems to vary day to day without particular cause.    She has been having more nausea and has been having intermittent low-grade fevers.   This feeling usually improves after dialysis.    She has been using lidocaine patches on her low back.  The lyrica seems to be helping with her burning pain.      Pain related to the left fifth metatarsal fracture is improving.      She has been having some trouble with getting stronger after her last hospital stay.  This is gradually improving.  Pain still seems to limit.     She is on the [facility] transplant list.    No side effects from oxycodone, mild constipation is managed with colace and a miralax equivalent.  She is having regular bowel movements on a daily basis.  The oxycodone helps control her pain.  She has been trying to wait to take it until her pain is more significant, but then she reports she gets behind her pain and it is \"really bad.\"     ORT: 3  PHQ-9:4    ROS   Unchanged from 05/16/2019 except as noted in HPI     Red Flags :   Chills, Sweats:No fevers, chills and night sweats.  She does report some low-grade fevers  Involuntary Weight Loss: Denies  Bowel/Bladder Incontinence: Denies  Saddle Anesthesia: Denies    PMHx:   Past Medical History:   Diagnosis Date   • Arthritis     all joints,r/t lupus   • Avascular necrosis of bones of both hips (HCC) 10/10/2016   • Clostridium difficile colitis 5/3/2011   • " Dialysis patient    • ESBL (extended spectrum beta-lactamase) producing bacteria infection 8/25/2014   • Fibromyalgia    • Hypertension    • Lupus    • Pneumonia    • Psychiatric disorder     anxiety, depression   • Pyelonephritis 11/21/2017   • Renal failure    • Sepsis due to pneumonia ([facility]) 6/7/2018   • Status epilepticus ([facility]) 12/13/2018       PSHx:   Past Surgical History:   Procedure Laterality Date   • AV FISTULA REVISION Right 8/11/2018    Procedure: AV FISTULA REVISION- Graft and Thrombectomy;  Surgeon: Shabbir Ardon M.D.;  Location: [facility];  Service: General   • AV FISTULA THROMBOLYSIS Right 8/11/2018    Procedure: AV FISTULA THROMBOLYSIS;  Surgeon: Shabbir Ardon M.D.;  Location: [facility];  Service: General   • AV FISTULA CREATION Right 12/9/2017    Procedure: AV FISTULA CREATION-ARM FOR GRAFT;  Surgeon: Lesly Jansen M.D.;  Location: [facility];  Service: General   • THROMBECTOMY  12/9/2017    Procedure: THROMBECTOMY;  Surgeon: Lesly Jansen M.D.;  Location: [facility];  Service: General   • THROMBECTOMY Right 8/21/2016    Procedure: THROMBECTOMY - right AV fistula graft with grams;  Surgeon: Shabbir Ardon M.D.;  Location: [facility];  Service:    • ANGIOPLASTY BALLOON  8/21/2016    Procedure: ANGIOPLASTY BALLOON;  Surgeon: Shabbir Ardon M.D.;  Location: [facility];  Service:    • THROMBECTOMY Right 8/20/2016    Procedure: THROMBECTOMY AV GRAFT;  Surgeon: Shabbir Ardon M.D.;  Location: [facility];  Service:    • AV FISTULA CREATION Right 7/12/2016    Procedure: AV FISTULA CREATION WITH GRAFT BRACHIAL AXILLARY;  Surgeon: Shabbir Ardon M.D.;  Location: SURGERY [facility];  Service:    • CLOSED REDUCTION Right 7/5/2016    Procedure: CLOSED REDUCTION- Hip ;  Surgeon: Michael Holman M.D.;  Location: [facility];  Service:    • HIP ARTHROPLASTY TOTAL Right  1/18/2016    Procedure: HIP ARTHROPLASTY TOTAL;  Surgeon: Michael Holman M.D.;  Location: SURGERY [facility];  Service:    • AV FISTULA CREATION  2/3/2015    Performed by Shabbir Ardon M.D. at SURGERY [facility]   • AV FISTULA CREATION  11/14/2014    Performed by Shabbir Ardon M.D. at SURGERY [facility]   • AV FISTULA CREATION  9/9/2014    Performed by Shabbir Ardon M.D. at SURGERY [facility]   • CATH PLACEMENT  9/9/2014    Performed by Shabbir Ardon M.D. at SURGERY [facility]   • OTHER  5/2011    tracheostomy   • GASTROSCOPY-ENDO  4/27/2011    Performed by PALMIRA DOAN at ENDOSCOPY [facility]   • COLONOSCOPY WITH BIOPSY  4/20/2011    Performed by ALCIRA CRUZ at ENDOSCOPY [facility]   • GASTROSCOPY-ENDO  4/18/2011    Performed by ALCIRA CRUZ at ENDOSCOPY [facility]   • GASTROSCOPY-ENDO  4/10/2011    Performed by SYED JURADO at ENDOSCOPY [facility]   • SCLEROTHERAPHY  4/10/2011    Performed by SYED JURADO at ENDOSCOPY [facility]   • OTHER ABDOMINAL SURGERY      kidney biopsy   • TRACHEOSTOMY         Family history   Denies neuromuscular disease  Family History   Problem Relation Age of Onset   • Heart Disease Mother    • Cancer Father        Medications:   Outpatient Prescriptions Marked as Taking for the 6/13/19 encounter (Office Visit) with Quinn Chandler M.D.   Medication Sig Dispense Refill   • Oxycodone HCl 20 MG Tab Take 1/2-1 every 8 hours (up to 2.5 pills/day) with 1 additional pill on dialysis days (3 days/week) 82 Tab 0   • pregabalin (LYRICA) 75 MG Cap Take 1 Cap by mouth 3 times a day for 30 days. 90 Cap 1   • Methocarbamol (ROBAXIN PO) Take  by mouth.     • promethazine (PHENERGAN) 25 MG Tab Take 1 Tab by mouth every 8 hours as needed for Nausea/Vomiting. 60 Tab 3   • traZODone (DESYREL) 50 MG Tab Take 1 Tab by mouth every bedtime. 90 Tab 3   • lidocaine (LIDODERM) 5 % Patch Apply 1 Patch to skin as  "directed every 24 hours. 10 Patch 1   • omeprazole (PRILOSEC) 20 MG delayed-release capsule Take 1 Cap by mouth every day. 30 Cap 3   • sevelamer carbonate (RENVELA) 800 MG Tab tablet Take 2,400 mg by mouth 3 times a day, with meals.     • ferrous sulfate 325 (65 FE) MG tablet Take 325 mg by mouth every day.     • ascorbic acid (ASCORBIC ACID) 500 MG Tab Take 500 mg by mouth every day.     • hydroxychloroquine (PLAQUENIL) 200 MG Tab Take 200 mg by mouth every bedtime.     • cholecalciferol (VITAMIN D3) 5000 UNIT Cap Take 10,000 Units by mouth every day.         Allergies:   Allergies   Allergen Reactions   • Lorazepam [Ativan] Anxiety     Patient becomes severely paranoid and agitated   • Morphine Itching     Tolerates Dilaudid   • Seasonal Runny Nose and Itching     Hay fever, sabiha brush       Social Hx:   Social History     Social History   • Marital status: Single     Spouse name: N/A   • Number of children: N/A   • Years of education: N/A     Occupational History   • Not on file.     Social History Main Topics   • Smoking status: Former Smoker     Packs/day: 0.50     Years: 18.00     Types: Cigarettes     Quit date: 6/13/2011   • Smokeless tobacco: Never Used      Comment: 1/2 ppd   • Alcohol use No   • Drug use: No   • Sexual activity: Not on file     Other Topics Concern   •  Service No   • Blood Transfusions Yes   • Caffeine Concern No   • Occupational Exposure No   • Hobby Hazards No   • Sleep Concern Yes   • Stress Concern Yes   • Weight Concern Yes   • Special Diet Yes   • Back Care No   • Exercise Yes   • Bike Helmet No   • Seat Belt Yes   • Self-Exams Yes     Social History Narrative   • No narrative on file       EXAMINATION     Physical Exam:   Vitals: /68 (BP Location: Left arm, Patient Position: Sitting, BP Cuff Size: Adult long)   Pulse 84   Temp 36.7 °C (98.1 °F) (Temporal)   Ht 1.651 m (5' 5\")   Wt 80.7 kg (177 lb 14.6 oz)   SpO2 95%     Constitutional:   Body Habitus: Body mass " index is 29.61 kg/m².  Cooperation: Fully cooperates with exam  Appearance: Well-groomed no disheveled, in no acute distress  Respiratory-  breathing comfortable on room air, no wheezing.  Psychiatric- alert and oriented ×3. Normal affect.   Spine:   No focal motor deficits in the lower extremities bilaterally.  Sensation is grossly intact to light touch in the lower extremities bilaterally. She does not report significant paresthesias today.  Reflexes 2+ patella and 1+ achilles bilaterally  Hip ROM is painful with Doroteo's and anterior groin pain.  Negative Doroteo's on the left.  Gait slow, steady without loss of balance.  No use of assist device.  No walking shoe on the left      MEDICAL DECISION MAKING    DATA    Labs:     UDS:   04/16/2019 Positive for oxymorphone  01/17/2019 Oxycodone and metabolites as expected  10/02/2018 Oxycodone and metabolites, also fentanyl    01/09/2019: Hep B surface ag negative  01/08/2019: GFR 7; BUN 23 Cr 6.68, Plt 160    12/15/2018 CRP 3.03    BMP 12/16/2018  Na 136, Potassium 4.4, chloride 96, CO2 23 Glucose 83, BUN 55H, Cr 9.44H, Calcium 9.9, Anion gap 17.0H    CBC 3.3, Hb 9.6, Hct 31.3, Plt 115    Lab Results   Component Value Date/Time    HBA1C 4.9 06/07/2018 02:29 AM        Imaging:   I reviewed the following radiology reports reviewed  Xray left foot 01/21/2019  Nondisplaced transverse fracture through the base of the fifth metatarsal.    MRI lumbar spine 1/1/19  1.  Diffusely decreased signal again seen throughout the marrow space consistent with red marrow conversion, likely secondary to chronic anemia.  2.  No significant disc bulging, central canal narrowing, or foraminal narrowing.  3.  No lumbar spine fracture or subluxation.    MRI brain 12/13/2018  1.  There is no hippocampal sclerosis.  2.  There is no evidence of cortical dysplasia or neuronal migrational abnormalities.  3.  A few nonspecific T2 hyperintensities in the supratentorial white matter likely representing  nonspecific foci of gliosis, chronic ischemia and demyelination. This is unchanged since the previous MRI dated 2/23/2012.       Chest xray 06/07/2018: Minimal right-sided opacities as described above, suggesting pneumonia.               Results for orders placed during the hospital encounter of 07/19/17   MR-LUMBAR SPINE-W/O    Impression 1.  Diffuse decreased bone marrow signal intensity throughout the lumbar spine and sacrum, presumably secondary to chronic anemia.    2.  Otherwise unremarkable MRI scan of the lumbar spine without contrast.                                    DIAGNOSIS   Visit Diagnoses     ICD-10-CM   1. Fibromyalgia M79.7   2. ESRD (end stage renal disease) on dialysis ([facility]) N18.6    Z99.2   3. Systemic lupus erythematosus, unspecified SLE type, unspecified organ involvement status ([facility]) M32.9   4. Avascular necrosis of bones of both hips ([facility]) M87.051    M87.052   5. Chronic, continuous use of opioids F11.90         ASSESSMENT and PLAN:     Debby Carrizales 38 y.o. Female returns for follow-up of her chronic pain related to lupus, AVN hips, low back and peripheral neuropathy    Debby was seen today for follow-up.    Diagnoses and all orders for this visit:    Fibromyalgia  -     Oxycodone HCl 20 MG Tab; Take 1/2-1 every 8 hours (up to 2.5 pills/day) with 1 additional pill on dialysis days (3 days/week)  -     pregabalin (LYRICA) 75 MG Cap; Take 1 Cap by mouth 3 times a day for 30 days.  -     Pain Management Screen    ESRD (end stage renal disease) on dialysis ([facility])    Systemic lupus erythematosus, unspecified SLE type, unspecified organ involvement status ([facility])    Avascular necrosis of bones of both hips ([facility])  -     Oxycodone HCl 20 MG Tab; Take 1/2-1 every 8 hours (up to 2.5 pills/day) with 1 additional pill on dialysis days (3 days/week)    Chronic, continuous use of opioids  -     Pain Management Screen         Discussed that we are going to start weaning her oxycodone, but that it  "is better that she use it regularly and then reduce the dose.  We discussed how this might work and have agreed to reduce from 3/day to 2.5 pills/day and 1 extra on dialysis days.  Discussed plan to continue gradual wean and concerns about how this might be negatively impacting her health.  She has been on higher doses in the past.  She is agreeable to this plan.  Discussed her previous UDS results and that fact that she has been taking more irregularly. Advised against this with gradual wean with steady dose instead.    Continue lyrica 75mg po tid.  Refill given today.      Xray of the right hip ordered given worsened pain after recent fall.  She will get these films done.  She reports that she has been considering getting left hip replaced at some point.    In prescribing controlled substances to this patient, I certify that I have obtained and reviewed the medical history of Debby Carrizales. I have also made a good ly effort to obtain applicable records from other providers who have treated the patient and records demonstrating the following: report of \"drug-seeking behavior,\" although I suspect that she has organic reasons for pain and will continue to monitor as appropriate.     I have conducted a physical exam and documented it. I have reviewed Ms. Carrizales’s prescription history as maintained by the [place] Prescription Monitoring Program.     I have assessed the patient’s risk for abuse, dependency, and addiction using the validated Opioid Risk Tool available at https://www.mdcalc.com/dkbxvk-svui-zblj-ort-narcotic-abuse.     Given the above, I believe the benefits of controlled substance therapy outweigh the risks. The reasons for prescribing controlled substances include non-narcotic, oral analgesic alternatives have been inadequate for pain control and in my professional opinion, controlled substances are the only reasonable choice for this patient because she has limitations to medication " choices due to being on dialysis.   Accordingly, I have discussed the risk and benefits, treatment plan, and alternative therapies with the patient.       Follow up: 4 weeks       Please note that this dictation was created using voice recognition software. I have made every reasonable attempt to correct obvious errors but there may be errors of grammar and content that I may have overlooked prior to finalization of this note.      Quinn Chandler MD  Interventional Spine and Sports Physiatry  Physical Medicine and Rehabilitation  [facility]

## 2019-06-21 ENCOUNTER — APPOINTMENT (OUTPATIENT)
Dept: RADIOLOGY | Facility: MEDICAL CENTER | Age: 39
End: 2019-06-21
Attending: EMERGENCY MEDICINE
Payer: MEDICARE

## 2019-06-21 ENCOUNTER — HOSPITAL ENCOUNTER (OUTPATIENT)
Facility: MEDICAL CENTER | Age: 39
End: 2019-06-24
Attending: EMERGENCY MEDICINE | Admitting: INTERNAL MEDICINE
Payer: MEDICARE

## 2019-06-21 ENCOUNTER — APPOINTMENT (OUTPATIENT)
Dept: RADIOLOGY | Facility: MEDICAL CENTER | Age: 39
End: 2019-06-21
Attending: INTERNAL MEDICINE
Payer: MEDICARE

## 2019-06-21 DIAGNOSIS — F11.20 UNCOMPLICATED OPIOID DEPENDENCE (HCC): ICD-10-CM

## 2019-06-21 DIAGNOSIS — Z99.2 DIALYSIS PATIENT (HCC): ICD-10-CM

## 2019-06-21 DIAGNOSIS — M87.051 AVASCULAR NECROSIS OF BONES OF BOTH HIPS (HCC): ICD-10-CM

## 2019-06-21 DIAGNOSIS — M25.552 LEFT HIP PAIN: ICD-10-CM

## 2019-06-21 DIAGNOSIS — M87.052 AVASCULAR NECROSIS OF BONES OF BOTH HIPS (HCC): ICD-10-CM

## 2019-06-21 LAB
ALBUMIN SERPL BCP-MCNC: 3.2 G/DL (ref 3.2–4.9)
ALBUMIN/GLOB SERPL: 0.9 G/DL
ALP SERPL-CCNC: 76 U/L (ref 30–99)
ALT SERPL-CCNC: 9 U/L (ref 2–50)
ANION GAP SERPL CALC-SCNC: 15 MMOL/L (ref 0–11.9)
AST SERPL-CCNC: 11 U/L (ref 12–45)
BASOPHILS # BLD AUTO: 0.6 % (ref 0–1.8)
BASOPHILS # BLD: 0.04 K/UL (ref 0–0.12)
BILIRUB SERPL-MCNC: 0.6 MG/DL (ref 0.1–1.5)
BUN SERPL-MCNC: 48 MG/DL (ref 8–22)
CALCIUM SERPL-MCNC: 7.9 MG/DL (ref 8.4–10.2)
CHLORIDE SERPL-SCNC: 99 MMOL/L (ref 96–112)
CO2 SERPL-SCNC: 20 MMOL/L (ref 20–33)
CREAT SERPL-MCNC: 12.31 MG/DL (ref 0.5–1.4)
EOSINOPHIL # BLD AUTO: 0.16 K/UL (ref 0–0.51)
EOSINOPHIL NFR BLD: 2.3 % (ref 0–6.9)
ERYTHROCYTE [DISTWIDTH] IN BLOOD BY AUTOMATED COUNT: 42.7 FL (ref 35.9–50)
GLOBULIN SER CALC-MCNC: 3.5 G/DL (ref 1.9–3.5)
GLUCOSE SERPL-MCNC: 83 MG/DL (ref 65–99)
HCT VFR BLD AUTO: 31.7 % (ref 37–47)
HGB BLD-MCNC: 10.2 G/DL (ref 12–16)
IMM GRANULOCYTES # BLD AUTO: 0.02 K/UL (ref 0–0.11)
IMM GRANULOCYTES NFR BLD AUTO: 0.3 % (ref 0–0.9)
LACTATE BLD-SCNC: 0.9 MMOL/L (ref 0.5–2)
LYMPHOCYTES # BLD AUTO: 1.05 K/UL (ref 1–4.8)
LYMPHOCYTES NFR BLD: 14.8 % (ref 22–41)
MAGNESIUM SERPL-MCNC: 3.2 MG/DL (ref 1.5–2.5)
MCH RBC QN AUTO: 30.6 PG (ref 27–33)
MCHC RBC AUTO-ENTMCNC: 32.2 G/DL (ref 33.6–35)
MCV RBC AUTO: 95.2 FL (ref 81.4–97.8)
MONOCYTES # BLD AUTO: 0.46 K/UL (ref 0–0.85)
MONOCYTES NFR BLD AUTO: 6.5 % (ref 0–13.4)
NEUTROPHILS # BLD AUTO: 5.38 K/UL (ref 2–7.15)
NEUTROPHILS NFR BLD: 75.5 % (ref 44–72)
NRBC # BLD AUTO: 0 K/UL
NRBC BLD-RTO: 0 /100 WBC
PLATELET # BLD AUTO: 128 K/UL (ref 164–446)
PMV BLD AUTO: 10 FL (ref 9–12.9)
POTASSIUM SERPL-SCNC: 4.9 MMOL/L (ref 3.6–5.5)
PROT SERPL-MCNC: 6.7 G/DL (ref 6–8.2)
RBC # BLD AUTO: 3.33 M/UL (ref 4.2–5.4)
SODIUM SERPL-SCNC: 134 MMOL/L (ref 135–145)
WBC # BLD AUTO: 7.1 K/UL (ref 4.8–10.8)

## 2019-06-21 PROCEDURE — 73522 X-RAY EXAM HIPS BI 3-4 VIEWS: CPT

## 2019-06-21 PROCEDURE — 83605 ASSAY OF LACTIC ACID: CPT

## 2019-06-21 PROCEDURE — 99285 EMERGENCY DEPT VISIT HI MDM: CPT

## 2019-06-21 PROCEDURE — 83735 ASSAY OF MAGNESIUM: CPT

## 2019-06-21 PROCEDURE — 99220 PR INITIAL OBSERVATION CARE,LEVL III: CPT | Performed by: INTERNAL MEDICINE

## 2019-06-21 PROCEDURE — A9270 NON-COVERED ITEM OR SERVICE: HCPCS | Performed by: INTERNAL MEDICINE

## 2019-06-21 PROCEDURE — 96374 THER/PROPH/DIAG INJ IV PUSH: CPT

## 2019-06-21 PROCEDURE — 700102 HCHG RX REV CODE 250 W/ 637 OVERRIDE(OP): Performed by: EMERGENCY MEDICINE

## 2019-06-21 PROCEDURE — 700102 HCHG RX REV CODE 250 W/ 637 OVERRIDE(OP): Performed by: INTERNAL MEDICINE

## 2019-06-21 PROCEDURE — 71046 X-RAY EXAM CHEST 2 VIEWS: CPT

## 2019-06-21 PROCEDURE — 700111 HCHG RX REV CODE 636 W/ 250 OVERRIDE (IP): Performed by: EMERGENCY MEDICINE

## 2019-06-21 PROCEDURE — 700105 HCHG RX REV CODE 258: Performed by: INTERNAL MEDICINE

## 2019-06-21 PROCEDURE — 96376 TX/PRO/DX INJ SAME DRUG ADON: CPT

## 2019-06-21 PROCEDURE — G0378 HOSPITAL OBSERVATION PER HR: HCPCS

## 2019-06-21 PROCEDURE — 80053 COMPREHEN METABOLIC PANEL: CPT

## 2019-06-21 PROCEDURE — 700111 HCHG RX REV CODE 636 W/ 250 OVERRIDE (IP): Performed by: INTERNAL MEDICINE

## 2019-06-21 PROCEDURE — 71045 X-RAY EXAM CHEST 1 VIEW: CPT

## 2019-06-21 PROCEDURE — 96372 THER/PROPH/DIAG INJ SC/IM: CPT

## 2019-06-21 PROCEDURE — 96375 TX/PRO/DX INJ NEW DRUG ADDON: CPT

## 2019-06-21 PROCEDURE — A9270 NON-COVERED ITEM OR SERVICE: HCPCS | Performed by: EMERGENCY MEDICINE

## 2019-06-21 PROCEDURE — 85025 COMPLETE CBC W/AUTO DIFF WBC: CPT

## 2019-06-21 PROCEDURE — 87040 BLOOD CULTURE FOR BACTERIA: CPT | Mod: 91

## 2019-06-21 RX ORDER — AMOXICILLIN 250 MG
2 CAPSULE ORAL 2 TIMES DAILY
Status: DISCONTINUED | OUTPATIENT
Start: 2019-06-21 | End: 2019-06-24 | Stop reason: HOSPADM

## 2019-06-21 RX ORDER — HYDROMORPHONE HYDROCHLORIDE 1 MG/ML
0.5 INJECTION, SOLUTION INTRAMUSCULAR; INTRAVENOUS; SUBCUTANEOUS ONCE
Status: COMPLETED | OUTPATIENT
Start: 2019-06-21 | End: 2019-06-21

## 2019-06-21 RX ORDER — HYDRALAZINE HYDROCHLORIDE 20 MG/ML
10 INJECTION INTRAMUSCULAR; INTRAVENOUS EVERY 6 HOURS PRN
Status: DISCONTINUED | OUTPATIENT
Start: 2019-06-21 | End: 2019-06-24 | Stop reason: HOSPADM

## 2019-06-21 RX ORDER — ALBUMIN (HUMAN) 12.5 G/50ML
12.5 SOLUTION INTRAVENOUS
Status: DISCONTINUED | OUTPATIENT
Start: 2019-06-22 | End: 2019-06-24 | Stop reason: HOSPADM

## 2019-06-21 RX ORDER — LIDOCAINE 50 MG/G
1 PATCH TOPICAL PRN
COMMUNITY
End: 2020-12-04

## 2019-06-21 RX ORDER — DOCUSATE SODIUM 100 MG/1
100 CAPSULE, LIQUID FILLED ORAL DAILY
Status: DISCONTINUED | OUTPATIENT
Start: 2019-06-22 | End: 2019-06-21

## 2019-06-21 RX ORDER — HYDROMORPHONE HYDROCHLORIDE 1 MG/ML
0.5 INJECTION, SOLUTION INTRAMUSCULAR; INTRAVENOUS; SUBCUTANEOUS EVERY 4 HOURS PRN
Status: DISCONTINUED | OUTPATIENT
Start: 2019-06-21 | End: 2019-06-23

## 2019-06-21 RX ORDER — OXYCODONE HYDROCHLORIDE 10 MG/1
20 TABLET ORAL EVERY 8 HOURS PRN
Status: DISCONTINUED | OUTPATIENT
Start: 2019-06-21 | End: 2019-06-23

## 2019-06-21 RX ORDER — PREGABALIN 25 MG/1
75 CAPSULE ORAL 3 TIMES DAILY
Status: DISCONTINUED | OUTPATIENT
Start: 2019-06-21 | End: 2019-06-24 | Stop reason: HOSPADM

## 2019-06-21 RX ORDER — SODIUM CHLORIDE 9 MG/ML
INJECTION, SOLUTION INTRAVENOUS CONTINUOUS
Status: DISCONTINUED | OUTPATIENT
Start: 2019-06-21 | End: 2019-06-22

## 2019-06-21 RX ORDER — TIZANIDINE 4 MG/1
8 TABLET ORAL
COMMUNITY
End: 2020-04-20

## 2019-06-21 RX ORDER — METHOCARBAMOL 500 MG/1
TABLET, FILM COATED ORAL
Refills: 0 | Status: SHIPPED | COMMUNITY
Start: 2019-04-03 | End: 2019-06-21

## 2019-06-21 RX ORDER — BISACODYL 10 MG
10 SUPPOSITORY, RECTAL RECTAL
Status: DISCONTINUED | OUTPATIENT
Start: 2019-06-21 | End: 2019-06-24 | Stop reason: HOSPADM

## 2019-06-21 RX ORDER — LIDOCAINE 50 MG/G
1 PATCH TOPICAL DAILY
Status: DISCONTINUED | OUTPATIENT
Start: 2019-06-22 | End: 2019-06-24 | Stop reason: HOSPADM

## 2019-06-21 RX ORDER — SEVELAMER CARBONATE 800 MG/1
3200 TABLET, FILM COATED ORAL
Status: DISCONTINUED | OUTPATIENT
Start: 2019-06-21 | End: 2019-06-24 | Stop reason: HOSPADM

## 2019-06-21 RX ORDER — FERROUS SULFATE 325(65) MG
325 TABLET ORAL DAILY
Status: DISCONTINUED | OUTPATIENT
Start: 2019-06-22 | End: 2019-06-24 | Stop reason: HOSPADM

## 2019-06-21 RX ORDER — SODIUM CHLORIDE 9 MG/ML
250 INJECTION, SOLUTION INTRAVENOUS
Status: DISCONTINUED | OUTPATIENT
Start: 2019-06-22 | End: 2019-06-24 | Stop reason: HOSPADM

## 2019-06-21 RX ORDER — TRAZODONE HYDROCHLORIDE 50 MG/1
50 TABLET ORAL
Status: DISCONTINUED | OUTPATIENT
Start: 2019-06-21 | End: 2019-06-24 | Stop reason: HOSPADM

## 2019-06-21 RX ORDER — DOCUSATE SODIUM 100 MG/1
100 CAPSULE, LIQUID FILLED ORAL DAILY
Status: ON HOLD | COMMUNITY
End: 2021-05-05

## 2019-06-21 RX ORDER — OMEPRAZOLE 20 MG/1
20 CAPSULE, DELAYED RELEASE ORAL DAILY
Status: DISCONTINUED | OUTPATIENT
Start: 2019-06-22 | End: 2019-06-24 | Stop reason: HOSPADM

## 2019-06-21 RX ORDER — AMLODIPINE BESYLATE 5 MG/1
10 TABLET ORAL DAILY
Status: DISCONTINUED | OUTPATIENT
Start: 2019-06-21 | End: 2019-06-24 | Stop reason: HOSPADM

## 2019-06-21 RX ORDER — AMLODIPINE BESYLATE 10 MG/1
10 TABLET ORAL
COMMUNITY
End: 2019-08-26 | Stop reason: SDUPTHER

## 2019-06-21 RX ORDER — SEVELAMER CARBONATE 800 MG/1
1600 TABLET, FILM COATED ORAL
Status: DISCONTINUED | OUTPATIENT
Start: 2019-06-21 | End: 2019-06-24 | Stop reason: HOSPADM

## 2019-06-21 RX ORDER — HEPARIN SODIUM 5000 [USP'U]/ML
5000 INJECTION, SOLUTION INTRAVENOUS; SUBCUTANEOUS EVERY 8 HOURS
Status: DISCONTINUED | OUTPATIENT
Start: 2019-06-21 | End: 2019-06-24 | Stop reason: HOSPADM

## 2019-06-21 RX ORDER — ONDANSETRON 2 MG/ML
4 INJECTION INTRAMUSCULAR; INTRAVENOUS ONCE
Status: COMPLETED | OUTPATIENT
Start: 2019-06-21 | End: 2019-06-21

## 2019-06-21 RX ORDER — HYDROXYCHLOROQUINE SULFATE 200 MG/1
200 TABLET, FILM COATED ORAL
Status: DISCONTINUED | OUTPATIENT
Start: 2019-06-21 | End: 2019-06-24 | Stop reason: HOSPADM

## 2019-06-21 RX ORDER — ACETAMINOPHEN 325 MG/1
650 TABLET ORAL ONCE
Status: COMPLETED | OUTPATIENT
Start: 2019-06-21 | End: 2019-06-21

## 2019-06-21 RX ORDER — TIZANIDINE 4 MG/1
8 TABLET ORAL
Status: DISCONTINUED | OUTPATIENT
Start: 2019-06-21 | End: 2019-06-24 | Stop reason: HOSPADM

## 2019-06-21 RX ORDER — ASCORBIC ACID 500 MG
500 TABLET ORAL DAILY
Status: DISCONTINUED | OUTPATIENT
Start: 2019-06-22 | End: 2019-06-24 | Stop reason: HOSPADM

## 2019-06-21 RX ORDER — ACETAMINOPHEN 325 MG/1
650 TABLET ORAL EVERY 6 HOURS PRN
Status: DISCONTINUED | OUTPATIENT
Start: 2019-06-21 | End: 2019-06-24 | Stop reason: HOSPADM

## 2019-06-21 RX ORDER — POLYETHYLENE GLYCOL 3350 17 G/17G
1 POWDER, FOR SOLUTION ORAL
Status: DISCONTINUED | OUTPATIENT
Start: 2019-06-21 | End: 2019-06-24 | Stop reason: HOSPADM

## 2019-06-21 RX ADMIN — PREGABALIN 75 MG: 25 CAPSULE ORAL at 22:04

## 2019-06-21 RX ADMIN — TRAZODONE HYDROCHLORIDE 50 MG: 50 TABLET ORAL at 22:04

## 2019-06-21 RX ADMIN — HYDROXYCHLOROQUINE SULFATE 200 MG: 200 TABLET, FILM COATED ORAL at 22:06

## 2019-06-21 RX ADMIN — SENNOSIDES,DOCUSATE SODIUM 2 TABLET: 8.6; 5 TABLET, FILM COATED ORAL at 22:05

## 2019-06-21 RX ADMIN — ONDANSETRON HYDROCHLORIDE 4 MG: 2 INJECTION, SOLUTION INTRAMUSCULAR; INTRAVENOUS at 16:41

## 2019-06-21 RX ADMIN — AMLODIPINE BESYLATE 10 MG: 5 TABLET ORAL at 22:06

## 2019-06-21 RX ADMIN — ACETAMINOPHEN 650 MG: 325 TABLET, FILM COATED ORAL at 22:18

## 2019-06-21 RX ADMIN — HEPARIN SODIUM 5000 UNITS: 5000 INJECTION, SOLUTION INTRAVENOUS; SUBCUTANEOUS at 22:07

## 2019-06-21 RX ADMIN — ACETAMINOPHEN 650 MG: 325 TABLET, FILM COATED ORAL at 14:38

## 2019-06-21 RX ADMIN — HYDROMORPHONE HYDROCHLORIDE 0.5 MG: 1 INJECTION, SOLUTION INTRAMUSCULAR; INTRAVENOUS; SUBCUTANEOUS at 16:41

## 2019-06-21 RX ADMIN — HYDROMORPHONE HYDROCHLORIDE 0.5 MG: 1 INJECTION, SOLUTION INTRAMUSCULAR; INTRAVENOUS; SUBCUTANEOUS at 18:00

## 2019-06-21 RX ADMIN — OXYCODONE HYDROCHLORIDE 20 MG: 10 TABLET ORAL at 22:16

## 2019-06-21 RX ADMIN — SODIUM CHLORIDE: 9 INJECTION, SOLUTION INTRAVENOUS at 21:00

## 2019-06-21 RX ADMIN — HYDROMORPHONE HYDROCHLORIDE 0.5 MG: 1 INJECTION, SOLUTION INTRAMUSCULAR; INTRAVENOUS; SUBCUTANEOUS at 23:55

## 2019-06-21 RX ADMIN — SEVELAMER CARBONATE 3200 MG: 800 TABLET, FILM COATED ORAL at 22:03

## 2019-06-21 RX ADMIN — TIZANIDINE 8 MG: 4 TABLET ORAL at 22:07

## 2019-06-21 ASSESSMENT — ENCOUNTER SYMPTOMS
SENSORY CHANGE: 0
NECK PAIN: 0
DIZZINESS: 0
DIARRHEA: 0
SHORTNESS OF BREATH: 1
BACK PAIN: 1
HEADACHES: 0
MYALGIAS: 1
PALPITATIONS: 0
ABDOMINAL PAIN: 0
SPEECH CHANGE: 0
TINGLING: 0
PHOTOPHOBIA: 0
DOUBLE VISION: 0
TREMORS: 0
NAUSEA: 1
ORTHOPNEA: 0
HALLUCINATIONS: 0
BLURRED VISION: 0
CHILLS: 0
FEVER: 1
EYE PAIN: 0
CONSTIPATION: 0
SPUTUM PRODUCTION: 0
VOMITING: 0
FOCAL WEAKNESS: 0
WEIGHT LOSS: 0
COUGH: 1

## 2019-06-21 ASSESSMENT — LIFESTYLE VARIABLES: SUBSTANCE_ABUSE: 0

## 2019-06-21 NOTE — ED PROVIDER NOTES
ED Provider Note    CHIEF COMPLAINT  Chief Complaint   Patient presents with   • T-5000 FALL   • Hip Pain   • Fever       HPI  Debby Carrizales is a 38 y.o. female who presents with hip pain.  She has chronic pain from a vascular necrosis of the hip.  The right hip has had replacement done she had a relocation from a dislocated prosthesis in the recent past.  She is concerned about the location of the hip and the worsening left hip pain.  She states she does need surgery on that hip but has not had it done yet.  She is also had some low-grade fevers.  She has had this in the past blood cultures through dialysis have been done and is been negative.  They think it is from lupus flare.  She has no dysuria she has cough productive of phlegm she has no color it is no rhinitis no sore throat.  No abdominal pain she did miss dialysis today she comes in for evaluation all other systems are negative    REVIEW OF SYSTEMS  See HPI for further details    PAST MEDICAL HISTORY  Past Medical History:   Diagnosis Date   • Arthritis     all joints,r/t lupus   • Avascular necrosis of bones of both hips ([facility]) 10/10/2016   • Clostridium difficile colitis 5/3/2011   • Dialysis patient    • ESBL (extended spectrum beta-lactamase) producing bacteria infection 8/25/2014   • Fibromyalgia    • Hypertension    • Lupus    • Pneumonia    • Psychiatric disorder     anxiety, depression   • Pyelonephritis 11/21/2017   • Renal failure    • Sepsis due to pneumonia ([facility]) 6/7/2018   • Status epilepticus ([facility]) 12/13/2018       FAMILY HISTORY  Family History   Problem Relation Age of Onset   • Heart Disease Mother    • Cancer Father        SOCIAL HISTORY  Social History     Social History   • Marital status: Single     Spouse name: N/A   • Number of children: N/A   • Years of education: N/A     Social History Main Topics   • Smoking status: Former Smoker     Packs/day: 0.50     Years: 18.00     Types: Cigarettes     Quit date: 6/13/2011    • Smokeless tobacco: Never Used      Comment: 1/2 ppd   • Alcohol use No   • Drug use: No   • Sexual activity: Not on file     Other Topics Concern   •  Service No   • Blood Transfusions Yes   • Caffeine Concern No   • Occupational Exposure No   • Hobby Hazards No   • Sleep Concern Yes   • Stress Concern Yes   • Weight Concern Yes   • Special Diet Yes   • Back Care No   • Exercise Yes   • Bike Helmet No   • Seat Belt Yes   • Self-Exams Yes     Social History Narrative   • No narrative on file       SURGICAL HISTORY  Past Surgical History:   Procedure Laterality Date   • AV FISTULA REVISION Right 8/11/2018    Procedure: AV FISTULA REVISION- Graft and Thrombectomy;  Surgeon: Shabbir Ardon M.D.;  Location: SURGERY [facility];  Service: General   • AV FISTULA THROMBOLYSIS Right 8/11/2018    Procedure: AV FISTULA THROMBOLYSIS;  Surgeon: Shabbir Ardon M.D.;  Location: SURGERY [facility];  Service: General   • AV FISTULA CREATION Right 12/9/2017    Procedure: AV FISTULA CREATION-ARM FOR GRAFT;  Surgeon: Lesly Jansen M.D.;  Location: SURGERY [facility];  Service: General   • THROMBECTOMY  12/9/2017    Procedure: THROMBECTOMY;  Surgeon: Lesly Jansen M.D.;  Location: SURGERY [facility];  Service: General   • THROMBECTOMY Right 8/21/2016    Procedure: THROMBECTOMY - right AV fistula graft with grams;  Surgeon: Shabbir Ardon M.D.;  Location: SURGERY [facility];  Service:    • ANGIOPLASTY BALLOON  8/21/2016    Procedure: ANGIOPLASTY BALLOON;  Surgeon: Shabbir Ardon M.D.;  Location: SURGERY [facility];  Service:    • THROMBECTOMY Right 8/20/2016    Procedure: THROMBECTOMY AV GRAFT;  Surgeon: Shabbir Ardon M.D.;  Location: SURGERY [facility];  Service:    • AV FISTULA CREATION Right 7/12/2016    Procedure: AV FISTULA CREATION WITH GRAFT BRACHIAL AXILLARY;  Surgeon: Shabbir Ardon M.D.;  Location: SURGERY [facility];  Service:    • CLOSED REDUCTION  Right 7/5/2016    Procedure: CLOSED REDUCTION- Hip ;  Surgeon: Michael Holman M.D.;  Location: SURGERY [facility];  Service:    • HIP ARTHROPLASTY TOTAL Right 1/18/2016    Procedure: HIP ARTHROPLASTY TOTAL;  Surgeon: Michael Holman M.D.;  Location: SURGERY [facility];  Service:    • AV FISTULA CREATION  2/3/2015    Performed by Shabbir Ardon M.D. at SURGERY [facility]   • AV FISTULA CREATION  11/14/2014    Performed by Shabbir Ardon M.D. at SURGERY [facility]   • AV FISTULA CREATION  9/9/2014    Performed by Shabbir Ardon M.D. at SURGERY [facility]   • CATH PLACEMENT  9/9/2014    Performed by Shabbir Ardon M.D. at [facility]   • OTHER  5/2011    tracheostomy   • GASTROSCOPY-ENDO  4/27/2011    Performed by PALMIRA DOAN at ENDOSCOPY [facility]   • COLONOSCOPY WITH BIOPSY  4/20/2011    Performed by ALCIRA CRUZ at ENDOSCOPY [facility]   • GASTROSCOPY-ENDO  4/18/2011    Performed by ALCIRA CRUZ at ENDOSCOPY [facility]   • GASTROSCOPY-ENDO  4/10/2011    Performed by SYED JURADO at ENDOSCOPY [facility]   • SCLEROTHERAPHY  4/10/2011    Performed by SYED JURADO at ENDOSCOPY [facility]   • OTHER ABDOMINAL SURGERY      kidney biopsy   • TRACHEOSTOMY         CURRENT MEDICATIONS  Home Medications     Reviewed by Kasia Ragsdale (Pharmacy Tech) on 06/21/19 at 1554  Med List Status: Complete   Medication Last Dose Status   amLODIPine (NORVASC) 10 MG Tab 6/20/2019 Active   ascorbic acid (ASCORBIC ACID) 500 MG Tab 6/21/2019 Active   cholecalciferol (VITAMIN D3) 5000 UNIT Cap 6/21/2019 Active   docusate sodium (COLACE) 100 MG Cap 6/21/2019 Active   ferrous sulfate 325 (65 FE) MG tablet 6/21/2019 Active   hydroxychloroquine (PLAQUENIL) 200 MG Tab 6/20/2019 Active   lidocaine (LIDODERM) 5 % Patch OFF Active   omeprazole (PRILOSEC) 20 MG delayed-release capsule 6/21/2019 Active   Oxycodone HCl 20 MG Tab 6/21/2019  "Active   pregabalin (LYRICA) 75 MG Cap 6/21/2019 Active   promethazine (PHENERGAN) 25 MG Tab 6/21/2019 Active   sevelamer carbonate (RENVELA) 800 MG Tab tablet 6/20/2019 Active   tizanidine (ZANAFLEX) 4 MG Tab 6/20/2019 Active   traZODone (DESYREL) 50 MG Tab 6/20/2019 Active                ALLERGIES  Allergies   Allergen Reactions   • Lorazepam [Ativan] Anxiety     Patient becomes severely paranoid and agitated   • Morphine Itching     Tolerates Dilaudid   • Seasonal Runny Nose and Itching     Hay fever, sabiha brush       PHYSICAL EXAM  VITAL SIGNS: BP (!) 176/112 Comment: RN notified.  Pulse 85   Temp 38 °C (100.4 °F) (Oral) Comment: RN notified.  Resp 18   Ht 1.651 m (5' 5\")   Wt 81 kg (178 lb 9.2 oz)   SpO2 96%   BMI 29.72 kg/m²     Constitutional: Patient is alert and oriented x3 in moderate distress   HENT: Moist mucous membranes  Eyes:   No conjunctivitis or icterus  Neck: Trach is midline no palpable thyroid  Lymphatic: Negative anterior cervical lymph Ramya  Cardiovascular: Normal heart rate no murmur  Thorax & Lungs: Clear to auscultation  Back: No CVA tenderness  Abdomen: Nontender  Neurologic: Normal motor sensation  Extremities: AV fistula in the right forearm with good thrill  Psychiatric: Affect normal, Judgment normal, Mood normal.     Results for orders placed or performed during the hospital encounter of 06/21/19   CBC WITH DIFFERENTIAL   Result Value Ref Range    WBC 7.1 4.8 - 10.8 K/uL    RBC 3.33 (L) 4.20 - 5.40 M/uL    Hemoglobin 10.2 (L) 12.0 - 16.0 g/dL    Hematocrit 31.7 (L) 37.0 - 47.0 %    MCV 95.2 81.4 - 97.8 fL    MCH 30.6 27.0 - 33.0 pg    MCHC 32.2 (L) 33.6 - 35.0 g/dL    RDW 42.7 35.9 - 50.0 fL    Platelet Count 128 (L) 164 - 446 K/uL    MPV 10.0 9.0 - 12.9 fL    Neutrophils-Polys 75.50 (H) 44.00 - 72.00 %    Lymphocytes 14.80 (L) 22.00 - 41.00 %    Monocytes 6.50 0.00 - 13.40 %    Eosinophils 2.30 0.00 - 6.90 %    Basophils 0.60 0.00 - 1.80 %    Immature Granulocytes 0.30 0.00 - " 0.90 %    Nucleated RBC 0.00 /100 WBC    Neutrophils (Absolute) 5.38 2.00 - 7.15 K/uL    Lymphs (Absolute) 1.05 1.00 - 4.80 K/uL    Monos (Absolute) 0.46 0.00 - 0.85 K/uL    Eos (Absolute) 0.16 0.00 - 0.51 K/uL    Baso (Absolute) 0.04 0.00 - 0.12 K/uL    Immature Granulocytes (abs) 0.02 0.00 - 0.11 K/uL    NRBC (Absolute) 0.00 K/uL   COMP METABOLIC PANEL   Result Value Ref Range    Sodium 134 (L) 135 - 145 mmol/L    Potassium 4.9 3.6 - 5.5 mmol/L    Chloride 99 96 - 112 mmol/L    Co2 20 20 - 33 mmol/L    Anion Gap 15.0 (H) 0.0 - 11.9    Glucose 83 65 - 99 mg/dL    Bun 48 (H) 8 - 22 mg/dL    Creatinine 12.31 (HH) 0.50 - 1.40 mg/dL    Calcium 7.9 (L) 8.4 - 10.2 mg/dL    AST(SGOT) 11 (L) 12 - 45 U/L    ALT(SGPT) 9 2 - 50 U/L    Alkaline Phosphatase 76 30 - 99 U/L    Total Bilirubin 0.6 0.1 - 1.5 mg/dL    Albumin 3.2 3.2 - 4.9 g/dL    Total Protein 6.7 6.0 - 8.2 g/dL    Globulin 3.5 1.9 - 3.5 g/dL    A-G Ratio 0.9 g/dL   LACTIC ACID   Result Value Ref Range    Lactic Acid 0.9 0.5 - 2.0 mmol/L   ESTIMATED GFR   Result Value Ref Range    GFR If  4 (A) >60 mL/min/1.73 m 2    GFR If Non African American 3 (A) >60 mL/min/1.73 m 2      DX-CHEST-PORTABLE (1 VIEW)   Final Result      Rounded retrocardiac opacity not excluded. Further evaluation with PA and lateral plain films of the chest recommended.      Cardiomegaly and mild pulmonary vascular congestion.      Blunting of the left costophrenic angle may be related to pleural thickening or trace pleural fluid.         DX-HIP-BILATERAL-WITH PELVIS-3/4 VIEWS   Final Result      Successful relocation of right hip arthroplasty dislocation      Above average stool volume      Otherwise negative bilateral hip radiographs              COURSE & MEDICAL DECISION MAKING  Pertinent Labs & Imaging studies reviewed. (See chart for details)  The patient has intractable hip pain.  The x-rays do not show any worsening of the x-ray pattern and relocation is noted on the right  hip.  Patient is been given a milligram of Dilaudid for pain.    Her lab work is consistent with a dialysis patient is missed dialysis.  Her potassium is noted to be normal.  Chest x-ray shows some pulmonary congestion.    Patient will require dialysis before Monday.  She is being admitted for dialysis along with pain control.  I have contacted her nephrologist for consultation and the hospitalist for admission.    FINAL IMPRESSION  1.   1. Left hip pain    2. Dialysis patient (H[facility])        2.   3.         Electronically signed by: Jt Keller, 6/21/2019 4:20 PM

## 2019-06-21 NOTE — ED NOTES
Presents with a medical history significant for the following conditions:  Arthritis        all joints,r/t lupus   • Avascular necrosis of bones of both hips ([facility]) 10/10/2016   • Clostridium difficile colitis 5/3/2011   • Dialysis patient     • ESBL (extended spectrum beta-lactamase) producing bacteria infection 8/25/2014   • Fibromyalgia     • Hypertension     • Lupus     • Pneumonia    • Psychiatric disorder       anxiety, depression   • Pyelonephritis 11/21/2017   • Renal failure      Today she C/O fever recurring for the past 10 days, and chronic left hip pain for years (as stated above, because of her avascular necrosis).  She is a dialysis patient scheduled for her session today.

## 2019-06-22 LAB
ALBUMIN SERPL BCP-MCNC: 3.3 G/DL (ref 3.2–4.9)
ALBUMIN/GLOB SERPL: 0.9 G/DL
ALP SERPL-CCNC: 77 U/L (ref 30–99)
ALT SERPL-CCNC: 8 U/L (ref 2–50)
ANION GAP SERPL CALC-SCNC: 14 MMOL/L (ref 0–11.9)
AST SERPL-CCNC: 11 U/L (ref 12–45)
BASOPHILS # BLD AUTO: 1.3 % (ref 0–1.8)
BASOPHILS # BLD: 0.05 K/UL (ref 0–0.12)
BILIRUB SERPL-MCNC: 0.8 MG/DL (ref 0.1–1.5)
BUN SERPL-MCNC: 51 MG/DL (ref 8–22)
CALCIUM SERPL-MCNC: 8 MG/DL (ref 8.4–10.2)
CHLORIDE SERPL-SCNC: 99 MMOL/L (ref 96–112)
CO2 SERPL-SCNC: 24 MMOL/L (ref 20–33)
CREAT SERPL-MCNC: 13.24 MG/DL (ref 0.5–1.4)
EOSINOPHIL # BLD AUTO: 0.15 K/UL (ref 0–0.51)
EOSINOPHIL NFR BLD: 3.8 % (ref 0–6.9)
ERYTHROCYTE [DISTWIDTH] IN BLOOD BY AUTOMATED COUNT: 44.7 FL (ref 35.9–50)
GLOBULIN SER CALC-MCNC: 3.5 G/DL (ref 1.9–3.5)
GLUCOSE SERPL-MCNC: 74 MG/DL (ref 65–99)
HAV IGM SERPL QL IA: NEGATIVE
HBV CORE IGM SER QL: NEGATIVE
HBV SURFACE AG SER QL: NEGATIVE
HCT VFR BLD AUTO: 32.9 % (ref 37–47)
HCV AB SER QL: NEGATIVE
HGB BLD-MCNC: 10.3 G/DL (ref 12–16)
IMM GRANULOCYTES # BLD AUTO: 0.01 K/UL (ref 0–0.11)
IMM GRANULOCYTES NFR BLD AUTO: 0.3 % (ref 0–0.9)
LYMPHOCYTES # BLD AUTO: 1.1 K/UL (ref 1–4.8)
LYMPHOCYTES NFR BLD: 27.6 % (ref 22–41)
MCH RBC QN AUTO: 30.9 PG (ref 27–33)
MCHC RBC AUTO-ENTMCNC: 31.3 G/DL (ref 33.6–35)
MCV RBC AUTO: 98.8 FL (ref 81.4–97.8)
MONOCYTES # BLD AUTO: 0.41 K/UL (ref 0–0.85)
MONOCYTES NFR BLD AUTO: 10.3 % (ref 0–13.4)
NEUTROPHILS # BLD AUTO: 2.27 K/UL (ref 2–7.15)
NEUTROPHILS NFR BLD: 56.7 % (ref 44–72)
NRBC # BLD AUTO: 0 K/UL
NRBC BLD-RTO: 0 /100 WBC
PLATELET # BLD AUTO: 122 K/UL (ref 164–446)
PMV BLD AUTO: 10 FL (ref 9–12.9)
POTASSIUM SERPL-SCNC: 4.9 MMOL/L (ref 3.6–5.5)
PROT SERPL-MCNC: 6.8 G/DL (ref 6–8.2)
RBC # BLD AUTO: 3.33 M/UL (ref 4.2–5.4)
SODIUM SERPL-SCNC: 137 MMOL/L (ref 135–145)
WBC # BLD AUTO: 4 K/UL (ref 4.8–10.8)

## 2019-06-22 PROCEDURE — 700102 HCHG RX REV CODE 250 W/ 637 OVERRIDE(OP): Performed by: INTERNAL MEDICINE

## 2019-06-22 PROCEDURE — 96375 TX/PRO/DX INJ NEW DRUG ADDON: CPT

## 2019-06-22 PROCEDURE — 85025 COMPLETE CBC W/AUTO DIFF WBC: CPT

## 2019-06-22 PROCEDURE — 99214 OFFICE O/P EST MOD 30 MIN: CPT | Performed by: INTERNAL MEDICINE

## 2019-06-22 PROCEDURE — 99225 PR SUBSEQUENT OBSERVATION CARE,LEVEL II: CPT | Performed by: INTERNAL MEDICINE

## 2019-06-22 PROCEDURE — G0378 HOSPITAL OBSERVATION PER HR: HCPCS

## 2019-06-22 PROCEDURE — 80074 ACUTE HEPATITIS PANEL: CPT

## 2019-06-22 PROCEDURE — A9270 NON-COVERED ITEM OR SERVICE: HCPCS | Performed by: INTERNAL MEDICINE

## 2019-06-22 PROCEDURE — 36415 COLL VENOUS BLD VENIPUNCTURE: CPT

## 2019-06-22 PROCEDURE — 700111 HCHG RX REV CODE 636 W/ 250 OVERRIDE (IP): Performed by: INTERNAL MEDICINE

## 2019-06-22 PROCEDURE — 96372 THER/PROPH/DIAG INJ SC/IM: CPT | Mod: XU

## 2019-06-22 PROCEDURE — 90935 HEMODIALYSIS ONE EVALUATION: CPT

## 2019-06-22 PROCEDURE — 80053 COMPREHEN METABOLIC PANEL: CPT

## 2019-06-22 PROCEDURE — 96376 TX/PRO/DX INJ SAME DRUG ADON: CPT | Mod: XU

## 2019-06-22 PROCEDURE — 700101 HCHG RX REV CODE 250: Performed by: INTERNAL MEDICINE

## 2019-06-22 RX ORDER — PROMETHAZINE HYDROCHLORIDE 25 MG/1
25 TABLET ORAL EVERY 6 HOURS PRN
Status: DISCONTINUED | OUTPATIENT
Start: 2019-06-22 | End: 2019-06-24 | Stop reason: HOSPADM

## 2019-06-22 RX ORDER — HEPARIN SODIUM 1000 [USP'U]/ML
3000 INJECTION, SOLUTION INTRAVENOUS; SUBCUTANEOUS
Status: DISCONTINUED | OUTPATIENT
Start: 2019-06-22 | End: 2019-06-24 | Stop reason: HOSPADM

## 2019-06-22 RX ADMIN — SEVELAMER CARBONATE 1600 MG: 800 TABLET, FILM COATED ORAL at 08:24

## 2019-06-22 RX ADMIN — HYDROXYCHLOROQUINE SULFATE 200 MG: 200 TABLET, FILM COATED ORAL at 21:38

## 2019-06-22 RX ADMIN — PREGABALIN 75 MG: 25 CAPSULE ORAL at 11:28

## 2019-06-22 RX ADMIN — FERROUS SULFATE TAB 325 MG (65 MG ELEMENTAL FE) 325 MG: 325 (65 FE) TAB at 05:58

## 2019-06-22 RX ADMIN — PREGABALIN 75 MG: 25 CAPSULE ORAL at 05:46

## 2019-06-22 RX ADMIN — PROMETHAZINE HYDROCHLORIDE 25 MG: 25 TABLET ORAL at 15:07

## 2019-06-22 RX ADMIN — SEVELAMER CARBONATE 3200 MG: 800 TABLET, FILM COATED ORAL at 12:02

## 2019-06-22 RX ADMIN — HYDROMORPHONE HYDROCHLORIDE 0.5 MG: 1 INJECTION, SOLUTION INTRAMUSCULAR; INTRAVENOUS; SUBCUTANEOUS at 04:32

## 2019-06-22 RX ADMIN — HYDROMORPHONE HYDROCHLORIDE 0.5 MG: 1 INJECTION, SOLUTION INTRAMUSCULAR; INTRAVENOUS; SUBCUTANEOUS at 13:16

## 2019-06-22 RX ADMIN — TRAZODONE HYDROCHLORIDE 50 MG: 50 TABLET ORAL at 21:39

## 2019-06-22 RX ADMIN — HEPARIN SODIUM 3000 UNITS: 1000 INJECTION, SOLUTION INTRAVENOUS; SUBCUTANEOUS at 14:40

## 2019-06-22 RX ADMIN — HEPARIN SODIUM 5000 UNITS: 5000 INJECTION, SOLUTION INTRAVENOUS; SUBCUTANEOUS at 21:39

## 2019-06-22 RX ADMIN — OXYCODONE HYDROCHLORIDE 20 MG: 10 TABLET ORAL at 21:42

## 2019-06-22 RX ADMIN — OXYCODONE HYDROCHLORIDE AND ACETAMINOPHEN 500 MG: 500 TABLET ORAL at 05:47

## 2019-06-22 RX ADMIN — OXYCODONE HYDROCHLORIDE 20 MG: 10 TABLET ORAL at 11:32

## 2019-06-22 RX ADMIN — SENNOSIDES,DOCUSATE SODIUM 2 TABLET: 8.6; 5 TABLET, FILM COATED ORAL at 05:46

## 2019-06-22 RX ADMIN — HYDROMORPHONE HYDROCHLORIDE 0.5 MG: 1 INJECTION, SOLUTION INTRAMUSCULAR; INTRAVENOUS; SUBCUTANEOUS at 09:01

## 2019-06-22 RX ADMIN — ACETAMINOPHEN 650 MG: 325 TABLET, FILM COATED ORAL at 15:12

## 2019-06-22 RX ADMIN — PREGABALIN 75 MG: 25 CAPSULE ORAL at 17:22

## 2019-06-22 RX ADMIN — LIDOCAINE 1 PATCH: 50 PATCH TOPICAL at 05:44

## 2019-06-22 RX ADMIN — HYDROMORPHONE HYDROCHLORIDE 0.5 MG: 1 INJECTION, SOLUTION INTRAMUSCULAR; INTRAVENOUS; SUBCUTANEOUS at 17:30

## 2019-06-22 RX ADMIN — SEVELAMER CARBONATE 3200 MG: 800 TABLET, FILM COATED ORAL at 17:20

## 2019-06-22 RX ADMIN — HEPARIN SODIUM 5000 UNITS: 5000 INJECTION, SOLUTION INTRAVENOUS; SUBCUTANEOUS at 05:46

## 2019-06-22 RX ADMIN — TIZANIDINE 8 MG: 4 TABLET ORAL at 21:39

## 2019-06-22 RX ADMIN — OMEPRAZOLE 20 MG: 20 CAPSULE, DELAYED RELEASE ORAL at 05:46

## 2019-06-22 ASSESSMENT — ENCOUNTER SYMPTOMS
WEAKNESS: 0
COUGH: 0
PALPITATIONS: 0
ABDOMINAL PAIN: 0
HALLUCINATIONS: 0
BLOOD IN STOOL: 0
DEPRESSION: 0
SORE THROAT: 0
BACK PAIN: 0
VOMITING: 0
HEADACHES: 0
DIZZINESS: 0
CHILLS: 0
FOCAL WEAKNESS: 0
FEVER: 0
SHORTNESS OF BREATH: 0
HEARTBURN: 0
NAUSEA: 0
MYALGIAS: 1
DIARRHEA: 0

## 2019-06-22 NOTE — ASSESSMENT & PLAN NOTE
She has a history of end-stage renal disease and she gets hemodialysis on Monday Wednesday and Friday.  HD performed 6/22  Dr. Byers

## 2019-06-22 NOTE — CONSULTS
Nephrology Consultation    Date of Service  6/22/2019    Referring Physician  Kaylan Chapa D.O.    Consulting Physician  Bora Douglas M.D.    Reason for Consultation  Management of ESRD on HD      History of Presenting Illness  38 y.o. female with a history of ESRD on hemodialysis Monday Wednesday Friday, due to lupus, and a history of avascular necrosis who presented 6/21/2019 with worsening left hip pain.    The patient complains that she had had right hip pain over the last few weeks.  The pain then started moving across her lower back to her coccyx, and then to her left hip.  She said the pain was so bad that she could not go to dialysis yesterday.  She came into the emergency room instead.  The patient says that she still urinates some.  She has been considering transitioning to peritoneal dialysis.  The patient has been on dialysis for 3 years.  She says she is listed for transplant at [facility].  She usually does not have any issues with dialysis.  She says her blood pressure is usually well controlled, unless she has an significant amount of pain.  She dialyzes via a right upper extremity AV graft without issues.  She has a history of failed AV fistula in the right forearm.  She has a port in place in her left chest due to history of chemotherapy for lupus flares in her left arm, and scarred left arm veins.    Review of Systems  Review of Systems   Constitutional: Negative for fever.   Respiratory: Negative for shortness of breath.    Cardiovascular: Negative for chest pain.   Gastrointestinal: Negative for abdominal pain.   All other systems reviewed and are negative.      Past Medical History  Past Medical History:   Diagnosis Date   • Arthritis     all joints,r/t lupus   • Avascular necrosis of bones of both hips (HCC) 10/10/2016   • Clostridium difficile colitis 5/3/2011   • Dialysis patient    • ESBL (extended spectrum beta-lactamase) producing bacteria infection 8/25/2014   • Fibromyalgia    •  Hypertension    • Lupus    • Pneumonia    • Psychiatric disorder     anxiety, depression   • Pyelonephritis 11/21/2017   • Renal failure    • Sepsis due to pneumonia ([facility]) 6/7/2018   • Status epilepticus (HCC) 12/13/2018       Surgical History  Past Surgical History:   Procedure Laterality Date   • AV FISTULA REVISION Right 8/11/2018    Procedure: AV FISTULA REVISION- Graft and Thrombectomy;  Surgeon: Shabbir Ardon M.D.;  Location: [facility];  Service: General   • AV FISTULA THROMBOLYSIS Right 8/11/2018    Procedure: AV FISTULA THROMBOLYSIS;  Surgeon: Shabbir Ardon M.D.;  Location: [facility];  Service: General   • AV FISTULA CREATION Right 12/9/2017    Procedure: AV FISTULA CREATION-ARM FOR GRAFT;  Surgeon: Lesly Jansen M.D.;  Location: [facility];  Service: General   • THROMBECTOMY  12/9/2017    Procedure: THROMBECTOMY;  Surgeon: Lesly Jansen M.D.;  Location: [facility];  Service: General   • THROMBECTOMY Right 8/21/2016    Procedure: THROMBECTOMY - right AV fistula graft with grams;  Surgeon: Shabbir Ardon M.D.;  Location: [facility];  Service:    • ANGIOPLASTY BALLOON  8/21/2016    Procedure: ANGIOPLASTY BALLOON;  Surgeon: Shabbir Ardon M.D.;  Location: [facility];  Service:    • THROMBECTOMY Right 8/20/2016    Procedure: THROMBECTOMY AV GRAFT;  Surgeon: Shabbir Ardon M.D.;  Location: [facility];  Service:    • AV FISTULA CREATION Right 7/12/2016    Procedure: AV FISTULA CREATION WITH GRAFT BRACHIAL AXILLARY;  Surgeon: Shabbir Ardon M.D.;  Location: [facility];  Service:    • CLOSED REDUCTION Right 7/5/2016    Procedure: CLOSED REDUCTION- Hip ;  Surgeon: Michael Holman M.D.;  Location: [facility];  Service:    • HIP ARTHROPLASTY TOTAL Right 1/18/2016    Procedure: HIP ARTHROPLASTY TOTAL;  Surgeon: Michael Holman M.D.;  Location: [facility];   Service:    • AV FISTULA CREATION  2/3/2015    Performed by Shabbir Ardon M.D. at SURGERY [facility]   • AV FISTULA CREATION  11/14/2014    Performed by Shabbir Ardon M.D. at SURGERY [facility]   • AV FISTULA CREATION  9/9/2014    Performed by Shabbir Ardon M.D. at SURGERY [facility]   • CATH PLACEMENT  9/9/2014    Performed by Shabbir Ardon M.D. at SURGERY [facility]   • OTHER  5/2011    tracheostomy   • GASTROSCOPY-ENDO  4/27/2011    Performed by PALMIRA DOAN at ENDOSCOPY [facility]   • COLONOSCOPY WITH BIOPSY  4/20/2011    Performed by ALCIRA CRUZ at ENDOSCOPY [facility]   • GASTROSCOPY-ENDO  4/18/2011    Performed by ALCIRA CRUZ at ENDOSCOPY [facility]   • GASTROSCOPY-ENDO  4/10/2011    Performed by SYED JURADO at ENDOSCOPY [facility]   • SCLEROTHERAPHY  4/10/2011    Performed by SYED JURADO at ENDOSCOPY [facility]   • OTHER ABDOMINAL SURGERY      kidney biopsy   • TRACHEOSTOMY         Family History  Family History   Problem Relation Age of Onset   • Heart Disease Mother    • Cancer Father        Social History  Social History     Social History   • Marital status: Single     Spouse name: N/A   • Number of children: N/A   • Years of education: N/A     Occupational History   • Not on file.     Social History Main Topics   • Smoking status: Former Smoker     Packs/day: 0.50     Years: 18.00     Types: Cigarettes     Quit date: 6/13/2011   • Smokeless tobacco: Never Used      Comment: 1/2 ppd   • Alcohol use No   • Drug use: No   • Sexual activity: Not on file     Other Topics Concern   •  Service No   • Blood Transfusions Yes   • Caffeine Concern No   • Occupational Exposure No   • Hobby Hazards No   • Sleep Concern Yes   • Stress Concern Yes   • Weight Concern Yes   • Special Diet Yes   • Back Care No   • Exercise Yes   • Bike Helmet No   • Seat Belt Yes   • Self-Exams Yes     Social History Narrative   •  No narrative on file       Medications  Current Facility-Administered Medications   Medication Dose Route Frequency Provider Last Rate Last Dose   • heparin injection 3,000 Units  3,000 Units Intravenous DIALYSIS PRN Bora Douglas M.D.   3,000 Units at 06/22/19 1440   • promethazine (PHENERGAN) tablet 25 mg  25 mg Oral Q6HRS PRN Kaylan Chapa D.OAnabel   25 mg at 06/22/19 1507   • senna-docusate (PERICOLACE or SENOKOT S) 8.6-50 MG per tablet 2 Tab  2 Tab Oral BID Haylie Coronado M.D.   2 Tab at 06/22/19 0546    And   • polyethylene glycol/lytes (MIRALAX) PACKET 1 Packet  1 Packet Oral QDAY PRN Haylie Coronado M.D.        And   • magnesium hydroxide (MILK OF MAGNESIA) suspension 30 mL  30 mL Oral QDAY PRN Haylie Coronado M.D.        And   • bisacodyl (DULCOLAX) suppository 10 mg  10 mg Rectal QDAY PRN Haylie Coronado M.D.       • heparin injection 5,000 Units  5,000 Units Subcutaneous Q8HRS Haylie Coronado M.D.   Stopped at 06/22/19 1400   • acetaminophen (TYLENOL) tablet 650 mg  650 mg Oral Q6HRS PRN Haylie Coronado M.D.   650 mg at 06/22/19 1512   • amLODIPine (NORVASC) tablet 10 mg  10 mg Oral DAILY Haylie Coronado M.D.   10 mg at 06/21/19 2206   • ascorbic acid tablet 500 mg  500 mg Oral DAILY Haylie Coronado M.D.   500 mg at 06/22/19 0547   • vitamin D (cholecalciferol) tablet 10,000 Units  10,000 Units Oral DAILY Haylie Coronado M.D.       • ferrous sulfate tablet 325 mg  325 mg Oral DAILY Haylie Coronado M.D.   325 mg at 06/22/19 0558   • hydroxychloroquine (PLAQUENIL) tablet 200 mg  200 mg Oral QHS Haylie Coronado M.D.   200 mg at 06/21/19 2206   • lidocaine (LIDODERM) 5 % 1 Patch  1 Patch Transdermal DAILY Haylie Coronado M.D.   1 Patch at 06/22/19 0544   • omeprazole (PRILOSEC) capsule 20 mg  20 mg Oral DAILY Haylie Coronado M.D.   20 mg at 06/22/19 0546   • oxyCODONE immediate release (ROXICODONE) tablet 20 mg  20 mg  Oral Q8HRS PRN Haylie Coronado M.D.   20 mg at 06/22/19 1132   • pregabalin (LYRICA) capsule 75 mg  75 mg Oral TID Haylie Coronado M.D.   75 mg at 06/22/19 1128   • sevelamer carbonate (RENVELA) tablet 3,200 mg  3,200 mg Oral TID WITH MEALS Haylie Coronado M.D.   3,200 mg at 06/22/19 1202   • tizanidine (ZANAFLEX) tablet 8 mg  8 mg Oral QHS Haylie Coronado M.D.   8 mg at 06/21/19 2207   • traZODone (DESYREL) tablet 50 mg  50 mg Oral QHS Haylie Coronado M.D.   50 mg at 06/21/19 2204   • hydrALAZINE (APRESOLINE) injection 10 mg  10 mg Intravenous Q6HRS PRN Haylie Coronado M.D.       • HYDROmorphone pf (DILAUDID) injection 0.5 mg  0.5 mg Intravenous Q4HRS PRN Haylie Coronado M.D.   0.5 mg at 06/22/19 1316   • sevelamer carbonate (RENVELA) tablet 1,600 mg  1,600 mg Oral With Snacks PRN Haylie Coronado M.D.       • NS (BOLUS) infusion 250 mL  250 mL Intravenous DIALYSIS PRN Bora Douglas M.D.       • albumin human 25% solution 12.5 g  12.5 g Intravenous DIALYSIS PRN Bora Douglas M.D.       • lidocaine (XYLOCAINE) 1 % injection 1 mL  1 mL Intradermal PRN Bora Douglas M.D.           Allergies  Allergies   Allergen Reactions   • Lorazepam [Ativan] Anxiety     Patient becomes severely paranoid and agitated   • Morphine Itching     Tolerates Dilaudid   • Seasonal Runny Nose and Itching     Hay fever, sabiha brush       Physical Exam  Temp:  [36.7 °C (98.1 °F)-36.8 °C (98.3 °F)] 36.8 °C (98.2 °F)  Pulse:  [64-80] 72  Resp:  [18] 18  BP: (126-165)/(84-98) 155/84  SpO2:  [90 %-99 %] 91 %    Physical Exam   Constitutional: She is oriented to person, place, and time. She appears well-developed. No distress.   Eyes: EOM are normal. No scleral icterus.   Neck: No tracheal deviation present.   Cardiovascular: Normal heart sounds.    No murmur heard.  Pulmonary/Chest: Effort normal. No stridor. She has no rales.   Abdominal: Soft. There is no tenderness.   Musculoskeletal: Normal range  of motion. She exhibits edema (trace LE).   Neurological: She is alert and oriented to person, place, and time.   Skin: Skin is warm and dry.   Psychiatric: She has a normal mood and affect.       Fluids  Date 06/22/19 0700 - 06/23/19 0659   Shift 6323-0377 0993-4468 4568-8537 24 Hour Total   I  N  T  A  K  E   P.O. 480   480    Shift Total 480   480   O  U  T  P  U  T   Shift Total       Weight (kg) 81 81 81 81         Laboratory  Labs reviewed, pertinent labs below.  Recent Labs      06/21/19   1645  06/22/19   0500   WBC  7.1  4.0*   RBC  3.33*  3.33*   HEMOGLOBIN  10.2*  10.3*   HEMATOCRIT  31.7*  32.9*   MCV  95.2  98.8*   MCH  30.6  30.9   MCHC  32.2*  31.3*   RDW  42.7  44.7   PLATELETCT  128*  122*   MPV  10.0  10.0     Recent Labs      06/21/19   1645  06/22/19   0500   SODIUM  134*  137   POTASSIUM  4.9  4.9   CHLORIDE  99  99   CO2  20  24   GLUCOSE  83  74   BUN  48*  51*   CREATININE  12.31*  13.24*   CALCIUM  7.9*  8.0*                URINALYSIS:    Lab Results  Component Value Date/Time   COLORURINE Yellow 01/01/2019 0705   CLARITY Clear 01/01/2019 0705   SPECGRAVITY 1.011 01/01/2019 0705   PHURINE 7.5 01/01/2019 0705   KETONES Negative 01/01/2019 0705   PROTEINURIN 300 (A) 01/01/2019 0705   BILIRUBINUR Negative 01/01/2019 0705   UROBILU 0.2 01/01/2019 0705   NITRITE Negative 01/01/2019 0705   LEUKESTERAS Trace (A) 01/01/2019 0705   OCCULTBLOOD Negative 01/01/2019 0705     [facility]    Lab Results  Component Value Date/Time   TOTPROTUR 65.0 (H) 01/21/2015 1520      Lab Results  Component Value Date/Time   CREATININEU 65.60 01/21/2015 1520       Imaging reviewed  DX-CHEST-2 VIEWS   Final Result      Mild patchy lingular opacity may represent atelectasis or pneumonitis.      Linear retrocardiac opacities likely represent atelectasis.      Linear opacities in the peripheral left mid to lower lung zone may represent atelectasis or scarring.      Stable cardiomegaly.      No pleural effusion is identified.             DX-CHEST-PORTABLE (1 VIEW)   Final Result      Rounded retrocardiac opacity not excluded. Further evaluation with PA and lateral plain films of the chest recommended.      Cardiomegaly and mild pulmonary vascular congestion.      Blunting of the left costophrenic angle may be related to pleural thickening or trace pleural fluid.         DX-HIP-BILATERAL-WITH PELVIS-3/4 VIEWS   Final Result      Successful relocation of right hip arthroplasty dislocation      Above average stool volume      Otherwise negative bilateral hip radiographs            Assessment/Plan  38 y.o. female with a history of ESRD on hemodialysis Monday Wednesday Friday, due to lupus, and a history of avascular necrosis who presented 6/21/2019 with worsening left hip pain.    1.  ESRD on hemodialysis Monday Wednesday Friday.  Plan for dialysis today, due to missed dialysis yesterday.  Patient is oliguric to nonoliguric.  Avoid nephrotoxins.  Check labs daily.    2.  Left hip pain, with history of avascular necrosis.  Patient has had right hip replacement in the past.  Patient follows with chronic pain clinic and orthopedic surgery.    3.  Anemia of chronic disease.  No acute indication for Epogen with hemoglobin over 11.  Check labs daily.    4.  Hyponatremia, improved.  Limit hypotonic fluids.  Check labs daily.    5.  Mild metabolic acidosis, likely due to missed dialysis.  This should resolve with dialysis.    6.  Hypertension, likely due to pain.  Continue current therapy.  Pain control.      Bora Douglas MD  Nephrology

## 2019-06-22 NOTE — PROGRESS NOTES
Bedside report received from night RN. Assumed care of patient. Daily plan of care discussed. Pt reports 7/10 pain and nausea at this time. Hourly rounding in place.

## 2019-06-22 NOTE — PROGRESS NOTES
HD done today, started @ 1246 and ended @ 1547 with net UF= 2000ml.  Report given to Fay Venegas. See flow sheet for details.

## 2019-06-22 NOTE — ASSESSMENT & PLAN NOTE
Continue amlodipine  Uncontrolled, >165 at times but she is labile and occ in 110s  Monitor for now

## 2019-06-22 NOTE — CARE PLAN
Problem: Safety  Goal: Will remain free from falls  Outcome: PROGRESSING AS EXPECTED    Intervention: Implement fall precautions   06/22/19 0900   OTHER   Environmental Precautions Treaded Slipper Socks on Patient;Personal Belongings, Wastebasket, Call Bell etc. in Easy Reach;Report Given to Other Health Care Providers Regarding Fall Risk;Bed in Low Position;Communication Sign for Patients & Families;Mobility Assessed & Appropriate Sign Placed   Fall precautions in place. Pt instructed to call for assistance before attempting to mobilize. Pt verbalized understanding, calls appropriately.       Problem: Venous Thromboembolism (VTW)/Deep Vein Thrombosis (DVT) Prevention:  Goal: Patient will participate in Venous Thrombosis (VTE)/Deep Vein Thrombosis (DVT)Prevention Measures  Outcome: PROGRESSING AS EXPECTED  Pt prescribed heparin for pharmacologic DVT prophylaxis.

## 2019-06-22 NOTE — H&P
Hospital Medicine History & Physical Note    Date of Service  6/21/2019    Primary Care Physician  Sushila Manzano M.D.    Consultants  Nephrology    Code Status  Full    Chief Complaint    Exacerbation Left hip pain for for 2 days    History of Presenting Illness    38 y.o. female with past medical history of end-stage renal disease on hemodialysis who presented to the hospital on 6/21/2019 with complaint of severe left hip pain.  Patient reported that she has history of avascular necrosis in her bilateral hips and she has been following pain doctor for her severe bilateral hip pain but today it got severely worse and she was unable to go for hemodialysis.  She has history of right hip replacement.  She reported that her left hip pain is 8/10 intensity and with mobility it increases to 10/10 intensity.  There is no alleviating factor and her pain medication at home did not help in her pain.  Movement aggravated her pain.  She expressed that her last hemodialysis was on Wednesday and her nephrologist is Dr. Najjar.  She also reported productive cough for 2 weeks for which she thinks that she has allergies.  She also expressed that she has been having fever off and on for the last 10 days.  She denies symptoms of chest pain, abdominal pain and vomiting.    Review of Systems  Review of Systems   Constitutional: Positive for fever. Negative for chills and weight loss.   HENT: Negative for hearing loss and tinnitus.    Eyes: Negative for blurred vision, double vision, photophobia and pain.   Respiratory: Positive for cough and shortness of breath. Negative for sputum production.    Cardiovascular: Negative for chest pain, palpitations, orthopnea and leg swelling.   Gastrointestinal: Positive for nausea. Negative for abdominal pain, constipation, diarrhea and vomiting.   Genitourinary: Negative for dysuria, frequency and urgency.   Musculoskeletal: Positive for back pain, joint pain and myalgias. Negative for neck  pain.   Skin: Negative for rash.   Neurological: Negative for dizziness, tingling, tremors, sensory change, speech change, focal weakness and headaches.   Psychiatric/Behavioral: Negative for hallucinations and substance abuse.   All other systems reviewed and are negative.      Past Medical History   has a past medical history of Arthritis; Avascular necrosis of bones of both hips (HCC) (10/10/2016); Clostridium difficile colitis (5/3/2011); Dialysis patient; ESBL (extended spectrum beta-lactamase) producing bacteria infection (8/25/2014); Fibromyalgia; Hypertension; Lupus; Pneumonia (); Psychiatric disorder; Pyelonephritis (11/21/2017); Renal failure; Sepsis due to pneumonia ([facility]) (6/7/2018); and Status epilepticus ([facility]) (12/13/2018). She also has no past medical history of Chronic airway obstruction, not elsewhere classified or Fall.    Surgical History   has a past surgical history that includes gastroscopy-endo (4/10/2011); sclerotheraphy (4/10/2011); gastroscopy-endo (4/18/2011); colonoscopy with biopsy (4/20/2011); gastroscopy-endo (4/27/2011); other (5/2011); other abdominal surgery; av fistula creation (9/9/2014); cath placement (9/9/2014); av fistula creation (11/14/2014); av fistula creation (2/3/2015); hip arthroplasty total (Right, 1/18/2016); closed reduction (Right, 7/5/2016); av fistula creation (Right, 7/12/2016); thrombectomy (Right, 8/20/2016); thrombectomy (Right, 8/21/2016); angioplasty balloon (8/21/2016); tracheostomy; av fistula creation (Right, 12/9/2017); thrombectomy (12/9/2017); av fistula revision (Right, 8/11/2018); and av fistula thrombolysis (Right, 8/11/2018).     Family History  family history includes Cancer in her father; Heart Disease in her mother.     Social History   reports that she quit smoking about 8 years ago. Her smoking use included Cigarettes. She has a 9.00 pack-year smoking history. She has never used smokeless tobacco. She reports that she does not drink  alcohol or use drugs.    Allergies  Allergies   Allergen Reactions   • Lorazepam [Ativan] Anxiety     Patient becomes severely paranoid and agitated   • Morphine Itching     Tolerates Dilaudid   • Seasonal Runny Nose and Itching     Hay fever, sabiha brush       Medications  Prior to Admission Medications   Prescriptions Last Dose Informant Patient Reported? Taking?   Oxycodone HCl 20 MG Tab 6/21/2019 at 0800 Patient No No   Sig: Take 1/2-1 every 8 hours (up to 2.5 pills/day) with 1 additional pill on dialysis days (3 days/week)   amLODIPine (NORVASC) 10 MG Tab 6/20/2019 at PRN Patient Yes Yes   Sig: Take 10 mg by mouth 1 time daily as needed.   ascorbic acid (ASCORBIC ACID) 500 MG Tab 6/21/2019 at AM Patient Yes No   Sig: Take 500 mg by mouth every day.   cholecalciferol (VITAMIN D3) 5000 UNIT Cap 6/21/2019 at AM Patient Yes No   Sig: Take 10,000 Units by mouth every day.   docusate sodium (COLACE) 100 MG Cap 6/21/2019 at AM Patient Yes Yes   Sig: Take 100 mg by mouth every day.   ferrous sulfate 325 (65 FE) MG tablet 6/21/2019 at AM Patient Yes No   Sig: Take 325 mg by mouth every day.   hydroxychloroquine (PLAQUENIL) 200 MG Tab 6/20/2019 at PM Patient Yes No   Sig: Take 200 mg by mouth every bedtime.   lidocaine (LIDODERM) 5 % Patch OFF at OFF Patient Yes Yes   Sig: Apply 1 Patch to skin as directed every 12 hours. On for 12 hours off for 12 hours   omeprazole (PRILOSEC) 20 MG delayed-release capsule 6/21/2019 at AM Patient No No   Sig: Take 1 Cap by mouth every day.   pregabalin (LYRICA) 75 MG Cap 6/21/2019 at AM Patient No No   Sig: Take 1 Cap by mouth 3 times a day for 30 days.   promethazine (PHENERGAN) 25 MG Tab 6/21/2019 at PRN Patient No No   Sig: Take 1 Tab by mouth every 8 hours as needed for Nausea/Vomiting.   sevelamer carbonate (RENVELA) 800 MG Tab tablet 6/20/2019 at PM Patient Yes No   Sig: Take 1,600-3,200 mg by mouth See Admin Instructions. 1,600 mg with snacks   3,200 mg with meals   tizanidine  (ZANAFLEX) 4 MG Tab 6/20/2019 at PM Patient Yes Yes   Sig: Take 8 mg by mouth every bedtime.   traZODone (DESYREL) 50 MG Tab 6/20/2019 at PM Patient No No   Sig: Take 1 Tab by mouth every bedtime.      Facility-Administered Medications: None       Physical Exam  Temp:  [38 °C (100.4 °F)] 38 °C (100.4 °F)  Pulse:  [75-85] 75  Resp:  [18] 18  BP: (176)/(112) 176/112  SpO2:  [90 %-98 %] 98 %    Physical Exam   Constitutional: She is oriented to person, place, and time. No distress.   HENT:   Head: Normocephalic and atraumatic.   Eyes: Pupils are equal, round, and reactive to light. Right eye exhibits no discharge. Left eye exhibits no discharge.   Neck: Normal range of motion. Neck supple.   Cardiovascular: Normal rate, regular rhythm and normal heart sounds.  Exam reveals no friction rub.    No murmur heard.  Pulmonary/Chest: Effort normal and breath sounds normal. No respiratory distress. She has no wheezes. She has no rales.   Abdominal: Soft. Bowel sounds are normal. She exhibits no distension. There is no tenderness. There is no rebound.   Abdominal striae present   Musculoskeletal: Normal range of motion. She exhibits tenderness (Bilateral hip more on left). She exhibits no edema.   No specific erythema on hip joints.   Neurological: She is alert and oriented to person, place, and time. No cranial nerve deficit.   Skin: Skin is warm and dry. No rash noted. She is not diaphoretic. No erythema.   Psychiatric: She has a normal mood and affect. Her behavior is normal.       Laboratory:  Recent Labs      06/21/19   1645   WBC  7.1   RBC  3.33*   HEMOGLOBIN  10.2*   HEMATOCRIT  31.7*   MCV  95.2   MCH  30.6   MCHC  32.2*   RDW  42.7   PLATELETCT  128*   MPV  10.0     Recent Labs      06/21/19   1645   SODIUM  134*   POTASSIUM  4.9   CHLORIDE  99   CO2  20   GLUCOSE  83   BUN  48*   CREATININE  12.31*   CALCIUM  7.9*     Recent Labs      06/21/19   1645   ALTSGPT  9   ASTSGOT  11*   ALKPHOSPHAT  76   TBILIRUBIN  0.6    GLUCOSE  83                 No results for input(s): TROPONINI in the last 72 hours.    Urinalysis:    No results found     Imaging:  DX-CHEST-2 VIEWS   Final Result      Mild patchy lingular opacity may represent atelectasis or pneumonitis.      Linear retrocardiac opacities likely represent atelectasis.      Linear opacities in the peripheral left mid to lower lung zone may represent atelectasis or scarring.      Stable cardiomegaly.      No pleural effusion is identified.            DX-CHEST-PORTABLE (1 VIEW)   Final Result      Rounded retrocardiac opacity not excluded. Further evaluation with PA and lateral plain films of the chest recommended.      Cardiomegaly and mild pulmonary vascular congestion.      Blunting of the left costophrenic angle may be related to pleural thickening or trace pleural fluid.         DX-HIP-BILATERAL-WITH PELVIS-3/4 VIEWS   Final Result      Successful relocation of right hip arthroplasty dislocation      Above average stool volume      Otherwise negative bilateral hip radiographs            Assessment/Plan:  I anticipate this patient is appropriate for observation status at this time.    Avascular necrosis of bones of both hips ([facility])- (present on admission)   Assessment & Plan    She has a history of avascular necrosis of bilateral hips.  She has been following pain specialist for her pain control.  She underwent right hip replacement and per patient she is in the process of getting left hip replacement.  X-ray of bilateral hip with pelvis showed successful relocation of right hip arthroplasty dislocation  Right hip joint did not show any specific erythema and swelling.   I started on Dilaudid for pain control and monitor for adverse effect.  I resume output oxycodone.  I am holding antibiotic initiation at this time as she has normal white blood cell count and there is no erythema on physical exam.     ESRD (end stage renal disease) on dialysis ([facility])- (present on admission)    Assessment & Plan    She has a history of end-stage renal disease and she gets hemodialysis on Monday Wednesday and Friday.  Her last hemodialysis was on Wednesday and today she was unable to go to hemodialysis due to severe pain in her left hip.  ED physician discussed with nephrologist and she will get hemodialysis tomorrow.     Opioid dependence (HCC)- (present on admission)   Assessment & Plan    She has been following pain doctor.         VTE prophylaxis: Heparin

## 2019-06-22 NOTE — ASSESSMENT & PLAN NOTE
She has a history of avascular necrosis of bilateral hips s/p R replacement ([facility] 1 year ago)  She has been following pain specialist  She needs a left hip replacement, admitted for progressive/uncontrolled pain  Wean dilaudid  Increase interval and dose of PO oxy 20-25 q6  PT/OT  Needs outpatient ortho follow up

## 2019-06-23 LAB
ALBUMIN SERPL BCP-MCNC: 2.8 G/DL (ref 3.2–4.9)
APPEARANCE UR: ABNORMAL
BACTERIA #/AREA URNS HPF: ABNORMAL /HPF
BASOPHILS # BLD AUTO: 1.1 % (ref 0–1.8)
BASOPHILS # BLD: 0.03 K/UL (ref 0–0.12)
BILIRUB UR QL STRIP.AUTO: NEGATIVE
BUN SERPL-MCNC: 28 MG/DL (ref 8–22)
CALCIUM SERPL-MCNC: 8 MG/DL (ref 8.4–10.2)
CHLORIDE SERPL-SCNC: 97 MMOL/L (ref 96–112)
CO2 SERPL-SCNC: 26 MMOL/L (ref 20–33)
COLOR UR: YELLOW
CREAT SERPL-MCNC: 7.92 MG/DL (ref 0.5–1.4)
EOSINOPHIL # BLD AUTO: 0.1 K/UL (ref 0–0.51)
EOSINOPHIL NFR BLD: 3.5 % (ref 0–6.9)
EPI CELLS #/AREA URNS HPF: ABNORMAL /HPF
ERYTHROCYTE [DISTWIDTH] IN BLOOD BY AUTOMATED COUNT: 41.9 FL (ref 35.9–50)
GLUCOSE SERPL-MCNC: 86 MG/DL (ref 65–99)
GLUCOSE UR STRIP.AUTO-MCNC: 250 MG/DL
HCT VFR BLD AUTO: 29.5 % (ref 37–47)
HGB BLD-MCNC: 9.5 G/DL (ref 12–16)
IMM GRANULOCYTES # BLD AUTO: 0.01 K/UL (ref 0–0.11)
IMM GRANULOCYTES NFR BLD AUTO: 0.4 % (ref 0–0.9)
KETONES UR STRIP.AUTO-MCNC: NEGATIVE MG/DL
LEUKOCYTE ESTERASE UR QL STRIP.AUTO: ABNORMAL
LYMPHOCYTES # BLD AUTO: 0.91 K/UL (ref 1–4.8)
LYMPHOCYTES NFR BLD: 31.9 % (ref 22–41)
MCH RBC QN AUTO: 30.5 PG (ref 27–33)
MCHC RBC AUTO-ENTMCNC: 32.2 G/DL (ref 33.6–35)
MCV RBC AUTO: 94.9 FL (ref 81.4–97.8)
MICRO URNS: ABNORMAL
MONOCYTES # BLD AUTO: 0.34 K/UL (ref 0–0.85)
MONOCYTES NFR BLD AUTO: 11.9 % (ref 0–13.4)
MUCOUS THREADS #/AREA URNS HPF: ABNORMAL /HPF
NEUTROPHILS # BLD AUTO: 1.46 K/UL (ref 2–7.15)
NEUTROPHILS NFR BLD: 51.2 % (ref 44–72)
NITRITE UR QL STRIP.AUTO: NEGATIVE
NRBC # BLD AUTO: 0 K/UL
NRBC BLD-RTO: 0 /100 WBC
PH UR STRIP.AUTO: 7.5 [PH]
PHOSPHATE SERPL-MCNC: 6 MG/DL (ref 2.5–4.5)
PLATELET # BLD AUTO: 116 K/UL (ref 164–446)
PMV BLD AUTO: 10.4 FL (ref 9–12.9)
POTASSIUM SERPL-SCNC: 4.3 MMOL/L (ref 3.6–5.5)
PROT UR QL STRIP: 30 MG/DL
RBC # BLD AUTO: 3.11 M/UL (ref 4.2–5.4)
RBC # URNS HPF: ABNORMAL /HPF
RBC UR QL AUTO: ABNORMAL
SODIUM SERPL-SCNC: 135 MMOL/L (ref 135–145)
SP GR UR STRIP.AUTO: 1.01
UNIDENT CRYS URNS QL MICRO: ABNORMAL /HPF
WBC # BLD AUTO: 2.9 K/UL (ref 4.8–10.8)
WBC #/AREA URNS HPF: ABNORMAL /HPF
YEAST #/AREA URNS HPF: ABNORMAL /HPF

## 2019-06-23 PROCEDURE — A9270 NON-COVERED ITEM OR SERVICE: HCPCS | Performed by: INTERNAL MEDICINE

## 2019-06-23 PROCEDURE — 87086 URINE CULTURE/COLONY COUNT: CPT

## 2019-06-23 PROCEDURE — 96372 THER/PROPH/DIAG INJ SC/IM: CPT

## 2019-06-23 PROCEDURE — 97161 PT EVAL LOW COMPLEX 20 MIN: CPT

## 2019-06-23 PROCEDURE — 85025 COMPLETE CBC W/AUTO DIFF WBC: CPT

## 2019-06-23 PROCEDURE — 80069 RENAL FUNCTION PANEL: CPT

## 2019-06-23 PROCEDURE — 99225 PR SUBSEQUENT OBSERVATION CARE,LEVEL II: CPT | Performed by: INTERNAL MEDICINE

## 2019-06-23 PROCEDURE — 96376 TX/PRO/DX INJ SAME DRUG ADON: CPT

## 2019-06-23 PROCEDURE — 700111 HCHG RX REV CODE 636 W/ 250 OVERRIDE (IP): Performed by: INTERNAL MEDICINE

## 2019-06-23 PROCEDURE — 700101 HCHG RX REV CODE 250: Performed by: INTERNAL MEDICINE

## 2019-06-23 PROCEDURE — 81001 URINALYSIS AUTO W/SCOPE: CPT

## 2019-06-23 PROCEDURE — G0378 HOSPITAL OBSERVATION PER HR: HCPCS

## 2019-06-23 PROCEDURE — 700102 HCHG RX REV CODE 250 W/ 637 OVERRIDE(OP): Performed by: INTERNAL MEDICINE

## 2019-06-23 PROCEDURE — 99214 OFFICE O/P EST MOD 30 MIN: CPT | Performed by: INTERNAL MEDICINE

## 2019-06-23 RX ORDER — OXYCODONE HYDROCHLORIDE 10 MG/1
20-25 TABLET ORAL EVERY 6 HOURS PRN
Status: DISCONTINUED | OUTPATIENT
Start: 2019-06-23 | End: 2019-06-24 | Stop reason: HOSPADM

## 2019-06-23 RX ORDER — HYDROMORPHONE HYDROCHLORIDE 1 MG/ML
0.5 INJECTION, SOLUTION INTRAMUSCULAR; INTRAVENOUS; SUBCUTANEOUS EVERY 6 HOURS PRN
Status: DISCONTINUED | OUTPATIENT
Start: 2019-06-23 | End: 2019-06-24 | Stop reason: HOSPADM

## 2019-06-23 RX ADMIN — HYDROMORPHONE HYDROCHLORIDE 0.5 MG: 1 INJECTION, SOLUTION INTRAMUSCULAR; INTRAVENOUS; SUBCUTANEOUS at 03:15

## 2019-06-23 RX ADMIN — PREGABALIN 75 MG: 25 CAPSULE ORAL at 05:28

## 2019-06-23 RX ADMIN — AMLODIPINE BESYLATE 10 MG: 5 TABLET ORAL at 05:28

## 2019-06-23 RX ADMIN — HEPARIN SODIUM 5000 UNITS: 5000 INJECTION, SOLUTION INTRAVENOUS; SUBCUTANEOUS at 05:30

## 2019-06-23 RX ADMIN — OXYCODONE HYDROCHLORIDE 20 MG: 10 TABLET ORAL at 06:14

## 2019-06-23 RX ADMIN — FERROUS SULFATE TAB 325 MG (65 MG ELEMENTAL FE) 325 MG: 325 (65 FE) TAB at 05:29

## 2019-06-23 RX ADMIN — OXYCODONE HYDROCHLORIDE AND ACETAMINOPHEN 500 MG: 500 TABLET ORAL at 05:28

## 2019-06-23 RX ADMIN — SEVELAMER CARBONATE 3200 MG: 800 TABLET, FILM COATED ORAL at 17:12

## 2019-06-23 RX ADMIN — HYDROMORPHONE HYDROCHLORIDE 0.5 MG: 1 INJECTION, SOLUTION INTRAMUSCULAR; INTRAVENOUS; SUBCUTANEOUS at 07:32

## 2019-06-23 RX ADMIN — SENNOSIDES,DOCUSATE SODIUM 2 TABLET: 8.6; 5 TABLET, FILM COATED ORAL at 05:28

## 2019-06-23 RX ADMIN — TRAZODONE HYDROCHLORIDE 50 MG: 50 TABLET ORAL at 20:15

## 2019-06-23 RX ADMIN — HYDROXYCHLOROQUINE SULFATE 200 MG: 200 TABLET, FILM COATED ORAL at 20:15

## 2019-06-23 RX ADMIN — HEPARIN SODIUM 5000 UNITS: 5000 INJECTION, SOLUTION INTRAVENOUS; SUBCUTANEOUS at 21:01

## 2019-06-23 RX ADMIN — TIZANIDINE 8 MG: 4 TABLET ORAL at 20:15

## 2019-06-23 RX ADMIN — PROMETHAZINE HYDROCHLORIDE 25 MG: 25 TABLET ORAL at 11:34

## 2019-06-23 RX ADMIN — OMEPRAZOLE 20 MG: 20 CAPSULE, DELAYED RELEASE ORAL at 05:28

## 2019-06-23 RX ADMIN — OXYCODONE HYDROCHLORIDE 20 MG: 10 TABLET ORAL at 20:13

## 2019-06-23 RX ADMIN — SEVELAMER CARBONATE 3200 MG: 800 TABLET, FILM COATED ORAL at 11:30

## 2019-06-23 RX ADMIN — OXYCODONE HYDROCHLORIDE 20 MG: 10 TABLET ORAL at 14:11

## 2019-06-23 RX ADMIN — HYDROMORPHONE HYDROCHLORIDE 0.5 MG: 1 INJECTION, SOLUTION INTRAMUSCULAR; INTRAVENOUS; SUBCUTANEOUS at 18:01

## 2019-06-23 RX ADMIN — PREGABALIN 75 MG: 25 CAPSULE ORAL at 11:29

## 2019-06-23 RX ADMIN — LIDOCAINE 1 PATCH: 50 PATCH TOPICAL at 05:27

## 2019-06-23 RX ADMIN — HYDROMORPHONE HYDROCHLORIDE 0.5 MG: 1 INJECTION, SOLUTION INTRAMUSCULAR; INTRAVENOUS; SUBCUTANEOUS at 11:34

## 2019-06-23 RX ADMIN — SEVELAMER CARBONATE 3200 MG: 800 TABLET, FILM COATED ORAL at 07:25

## 2019-06-23 RX ADMIN — PREGABALIN 75 MG: 25 CAPSULE ORAL at 17:12

## 2019-06-23 RX ADMIN — HEPARIN SODIUM 5000 UNITS: 5000 INJECTION, SOLUTION INTRAVENOUS; SUBCUTANEOUS at 13:53

## 2019-06-23 ASSESSMENT — ENCOUNTER SYMPTOMS
WEAKNESS: 0
HEARTBURN: 0
HALLUCINATIONS: 0
BACK PAIN: 0
VOMITING: 0
NAUSEA: 0
DIARRHEA: 0
CHILLS: 0
MYALGIAS: 1
PALPITATIONS: 0
SHORTNESS OF BREATH: 0
SORE THROAT: 0
HEADACHES: 0
DEPRESSION: 0
COUGH: 0
DIZZINESS: 0
FEVER: 0
FOCAL WEAKNESS: 0
ABDOMINAL PAIN: 0
BLOOD IN STOOL: 0

## 2019-06-23 ASSESSMENT — COGNITIVE AND FUNCTIONAL STATUS - GENERAL
HELP NEEDED FOR BATHING: A LITTLE
PERSONAL GROOMING: A LITTLE
SUGGESTED CMS G CODE MODIFIER MOBILITY: CK
SUGGESTED CMS G CODE MODIFIER DAILY ACTIVITY: CK
MOVING FROM LYING ON BACK TO SITTING ON SIDE OF FLAT BED: A LITTLE
DRESSING REGULAR LOWER BODY CLOTHING: A LITTLE
TOILETING: A LITTLE
STANDING UP FROM CHAIR USING ARMS: A LITTLE
DAILY ACTIVITIY SCORE: 18
DRESSING REGULAR UPPER BODY CLOTHING: A LITTLE
MOVING TO AND FROM BED TO CHAIR: A LITTLE
TURNING FROM BACK TO SIDE WHILE IN FLAT BAD: A LITTLE
CLIMB 3 TO 5 STEPS WITH RAILING: A LITTLE
EATING MEALS: A LITTLE
WALKING IN HOSPITAL ROOM: A LITTLE
MOBILITY SCORE: 18

## 2019-06-23 ASSESSMENT — GAIT ASSESSMENTS
GAIT LEVEL OF ASSIST: SUPERVISED
DISTANCE (FEET): 100
DEVIATION: ANTALGIC
ASSISTIVE DEVICE: SINGLE POINT CANE

## 2019-06-23 NOTE — CARE PLAN
Problem: Communication  Goal: The ability to communicate needs accurately and effectively will improve  Pt a/o x 4. Able to make needs known to staff.     Problem: Safety  Goal: Will remain free from injury  Calls for assistance. Waits for staff.

## 2019-06-23 NOTE — THERAPY
"Physical Therapy Evaluation completed.   Bed Mobility:  Supine to Sit: Independent  Transfers: Sit to Stand: Supervised  Gait: Level Of Assist: Supervised with Single Point Cane x 100 feet      Plan of Care: Patient with no further skilled PT needs in the acute care setting at this time  Discharge Recommendations: Equipment: No Equipment Needed. Post-acute therapy Currently anticipate no further skilled therapy needs once patient is discharged from the inpatient setting.  38 year old female admitted with L hip pain.Pt has a history of avasular necrosis Bi lat hips R THR in the past .Pt lives at home alone and is active.Pt was safe with transfers and ambulation with a SPC.She has no equipment or acute PT needs  See \"Rehab Therapy-Acute\" Patient Summary Report for complete documentation.     "

## 2019-06-23 NOTE — PROGRESS NOTES
Pt resting in bed. No signs of distress noted. VSS. Up to BR, back to bed, pain meds given. Pt has bilat arm tremors she states are due to pain. Discussed POC for the day.

## 2019-06-23 NOTE — PROGRESS NOTES
Nephrology Daily Progress Note    Date of Service  6/23/2019    Chief Complaint  38 y.o. female with a history of ESRD on hemodialysis Monday Wednesday Friday, due to lupus, and a history of avascular necrosis who presented 6/21/2019 with worsening left hip pain.      Interval Problem Update  6/23 -patient had dialysis last night with 2 L removed.  Patient seen this afternoon, hip pain still present, but slightly improved.  Otherwise no new complaints.  Patient is interested in learning more about peritoneal dialysis.    Review of Systems  Review of Systems   Constitutional: Negative for fever.   Respiratory: Negative for shortness of breath.    Cardiovascular: Negative for chest pain.   Gastrointestinal: Negative for abdominal pain.   All other systems reviewed and are negative.       Physical Exam  Temp:  [36.5 °C (97.7 °F)-37 °C (98.6 °F)] 36.6 °C (97.9 °F)  Pulse:  [65-80] 80  Resp:  [18] 18  BP: (113-184)/() 150/82  SpO2:  [90 %-99 %] 90 %    Physical Exam   Constitutional: She is oriented to person, place, and time. She appears well-developed. No distress.   Eyes: EOM are normal. No scleral icterus.   Neck: No tracheal deviation present.   Cardiovascular: Normal heart sounds.    No murmur heard.  Pulmonary/Chest: Effort normal. No stridor. She has no rales.   Abdominal: Soft. There is no tenderness.   Musculoskeletal: Normal range of motion. She exhibits no edema.   Neurological: She is alert and oriented to person, place, and time.   Skin: Skin is warm and dry.   Psychiatric: She has a normal mood and affect.   Access: Right brachial axillary AV graft with patent bruit and thrill      Fluids  No intake or output data in the 24 hours ending 06/23/19 1655    Laboratory  Labs reviewed, pertinent labs below.  Recent Labs      06/21/19   1645  06/22/19   0500  06/23/19   0530   WBC  7.1  4.0*  2.9*   RBC  3.33*  3.33*  3.11*   HEMOGLOBIN  10.2*  10.3*  9.5*   HEMATOCRIT  31.7*  32.9*  29.5*   MCV  95.2   98.8*  94.9   MCH  30.6  30.9  30.5   MCHC  32.2*  31.3*  32.2*   RDW  42.7  44.7  41.9   PLATELETCT  128*  122*  116*   MPV  10.0  10.0  10.4     Recent Labs      06/21/19   1645  06/22/19   0500  06/23/19   0530   SODIUM  134*  137  135   POTASSIUM  4.9  4.9  4.3   CHLORIDE  99  99  97   CO2  20  24  26   GLUCOSE  83  74  86   BUN  48*  51*  28*   CREATININE  12.31*  13.24*  7.92*   CALCIUM  7.9*  8.0*  8.0*               URINALYSIS:    Lab Results  Component Value Date/Time   COLORURINE Yellow 01/01/2019 0705   CLARITY Clear 01/01/2019 0705   SPECGRAVITY 1.011 01/01/2019 0705   PHURINE 7.5 01/01/2019 0705   KETONES Negative 01/01/2019 0705   PROTEINURIN 300 (A) 01/01/2019 0705   BILIRUBINUR Negative 01/01/2019 0705   UROBILU 0.2 01/01/2019 0705   NITRITE Negative 01/01/2019 0705   LEUKESTERAS Trace (A) 01/01/2019 0705   OCCULTBLOOD Negative 01/01/2019 0705     UPC    Lab Results  Component Value Date/Time   TOTPROTUR 65.0 (H) 01/21/2015 1520      Lab Results  Component Value Date/Time   CREATININEU 65.60 01/21/2015 1520         Imaging reviewed  DX-CHEST-2 VIEWS   Final Result      Mild patchy lingular opacity may represent atelectasis or pneumonitis.      Linear retrocardiac opacities likely represent atelectasis.      Linear opacities in the peripheral left mid to lower lung zone may represent atelectasis or scarring.      Stable cardiomegaly.      No pleural effusion is identified.            DX-CHEST-PORTABLE (1 VIEW)   Final Result      Rounded retrocardiac opacity not excluded. Further evaluation with PA and lateral plain films of the chest recommended.      Cardiomegaly and mild pulmonary vascular congestion.      Blunting of the left costophrenic angle may be related to pleural thickening or trace pleural fluid.         DX-HIP-BILATERAL-WITH PELVIS-3/4 VIEWS   Final Result      Successful relocation of right hip arthroplasty dislocation      Above average stool volume      Otherwise negative bilateral hip  radiographs            Current Facility-Administered Medications   Medication Dose Route Frequency Provider Last Rate Last Dose   • HYDROmorphone pf (DILAUDID) injection 0.5 mg  0.5 mg Intravenous Q6HRS PRN Kaylan Chapa D.O.       • oxyCODONE immediate release (ROXICODONE) tablet 20-25 mg  20-25 mg Oral Q6HRS PRN Kaylan Chapa D.O.       • heparin injection 3,000 Units  3,000 Units Intravenous DIALYSIS PRN Bora Douglas M.D.   3,000 Units at 06/22/19 1440   • promethazine (PHENERGAN) tablet 25 mg  25 mg Oral Q6HRS PRN Kaylan Chapa D.O.   25 mg at 06/23/19 1134   • senna-docusate (PERICOLACE or SENOKOT S) 8.6-50 MG per tablet 2 Tab  2 Tab Oral BID Haylie Coronado M.D.   2 Tab at 06/23/19 0528    And   • polyethylene glycol/lytes (MIRALAX) PACKET 1 Packet  1 Packet Oral QDAY PRN Haylie Coronado M.D.        And   • magnesium hydroxide (MILK OF MAGNESIA) suspension 30 mL  30 mL Oral QDAY PRN Haylie Coronado M.D.        And   • bisacodyl (DULCOLAX) suppository 10 mg  10 mg Rectal QDAY PRN Haylie Coronado M.D.       • heparin injection 5,000 Units  5,000 Units Subcutaneous Q8HRS Haylie Coronado M.D.   5,000 Units at 06/23/19 1353   • acetaminophen (TYLENOL) tablet 650 mg  650 mg Oral Q6HRS PRN Haylie Coronado M.D.   650 mg at 06/22/19 1512   • amLODIPine (NORVASC) tablet 10 mg  10 mg Oral DAILY Haylie Coronado M.D.   10 mg at 06/23/19 0528   • ascorbic acid tablet 500 mg  500 mg Oral DAILY Haylie Coronado M.D.   500 mg at 06/23/19 0528   • vitamin D (cholecalciferol) tablet 10,000 Units  10,000 Units Oral DAILY Kaylan M Lewman, D.O.       • ferrous sulfate tablet 325 mg  325 mg Oral DAILY Haylie Coronado M.D.   325 mg at 06/23/19 0529   • hydroxychloroquine (PLAQUENIL) tablet 200 mg  200 mg Oral QHS Haylie Coronado M.D.   200 mg at 06/22/19 2138   • lidocaine (LIDODERM) 5 % 1 Patch  1 Patch Transdermal DAILY Haylie Coronado M.D.   1  Patch at 06/23/19 0527   • omeprazole (PRILOSEC) capsule 20 mg  20 mg Oral DAILY Haylie Coronado M.D.   20 mg at 06/23/19 0528   • pregabalin (LYRICA) capsule 75 mg  75 mg Oral TID Haylie Coronado M.D.   75 mg at 06/23/19 1129   • sevelamer carbonate (RENVELA) tablet 3,200 mg  3,200 mg Oral TID WITH MEALS Haylie Coronado M.D.   3,200 mg at 06/23/19 1130   • tizanidine (ZANAFLEX) tablet 8 mg  8 mg Oral QHS Haylie Coronado M.D.   8 mg at 06/22/19 2139   • traZODone (DESYREL) tablet 50 mg  50 mg Oral QHS Haylie Coronado M.D.   50 mg at 06/22/19 2139   • hydrALAZINE (APRESOLINE) injection 10 mg  10 mg Intravenous Q6HRS PRN Haylie Coronado M.D.       • sevelamer carbonate (RENVELA) tablet 1,600 mg  1,600 mg Oral With Snacks PRN Haylie Coroando M.D.       • NS (BOLUS) infusion 250 mL  250 mL Intravenous DIALYSIS PRN Bora Douglas M.D.       • albumin human 25% solution 12.5 g  12.5 g Intravenous DIALYSIS PRN Bora Douglas M.D.       • lidocaine (XYLOCAINE) 1 % injection 1 mL  1 mL Intradermal PRN Bora Douglas M.D.             Assessment/Plan  38 y.o. female with a history of ESRD on hemodialysis Monday Wednesday Friday, due to lupus, and a history of avascular necrosis who presented 6/21/2019 with worsening left hip pain.     1.  ESRD on hemodialysis Monday Wednesday Friday, stable.  No plans for dialysis today.  Plan for next dialysis tomorrow.  Patient is oliguric to nonoliguric.  Avoid nephrotoxins.  Check labs daily.  Patient is interested in learning more about peritoneal dialysis.  I have made a referral to the kidney smart program, but she could also just walk across the [place] from the hemodialysis unit to the peritoneal dialysis unit to learn more.     2.  Left hip pain, with history of avascular necrosis, stable to slightly improved.  Patient has had right hip replacement in the past.  Patient follows with chronic pain clinic and orthopedic surgery.     3.  Anemia of  chronic disease, worsening.  Start Epogen 4000 units 3 times weekly at dialysis.  Check labs daily.    4.  Hyponatremia, stable.  Limit hypotonic fluids.  Check labs daily.     5.  Mild metabolic acidosis, improved after dialysis.  Check labs daily.     6.  Hypertension, still mildly uncontrolled.  Likely due to pain.  Continue current therapy.        Bora Douglas MD  Nephrology

## 2019-06-23 NOTE — PROGRESS NOTES
Hospital Medicine Daily Progress Note    Date of Service  6/23/2019    Chief Complaint  38 y.o. female admitted 6/21/2019 with left hip pain    Hospital Course    Very complicated past medical history including lupus complicate a by end-stage renal disease on dialysis Monday Wednesday Friday, avascular necrosis of bilateral hip status post right hip replacement in the past, chronic pain with narcotic dependence, uncontrolled hypertension admitted with severe left hip pain      Interval Problem Update  6/22: Got dialysis today, pain not well controlled and left hip.  No evidence of infection  6/23: Oxycodone increased, her pain is slightly better.  Blood pressure labile 110s-160s    Consultants/Specialty  Dr. Byers, nephrology    Code Status  Full    Disposition  Work on pain control, PT OT  No needs on discharge other than follow-up with orthopedic surgery    Review of Systems  Review of Systems   Constitutional: Positive for malaise/fatigue. Negative for chills and fever.   HENT: Negative for sore throat.    Respiratory: Negative for cough and shortness of breath.    Cardiovascular: Negative for chest pain and palpitations.   Gastrointestinal: Negative for abdominal pain, blood in stool, diarrhea, heartburn, nausea and vomiting.   Genitourinary: Negative for dysuria and frequency.   Musculoskeletal: Positive for joint pain (Slightly better today) and myalgias. Negative for back pain.   Neurological: Negative for dizziness, focal weakness, weakness and headaches.   Psychiatric/Behavioral: Negative for depression and hallucinations.   All other systems reviewed and are negative.       Physical Exam  Temp:  [36.5 °C (97.7 °F)-37 °C (98.6 °F)] 36.6 °C (97.9 °F)  Pulse:  [65-80] 80  Resp:  [18] 18  BP: (113-184)/() 150/82  SpO2:  [90 %-99 %] 90 %    Physical Exam   Constitutional: She is oriented to person, place, and time. No distress.   Very chronically ill-appearing appears older than stated age   HENT:   Head:  Normocephalic.   Mouth/Throat: No oropharyngeal exudate.   Eyes: Pupils are equal, round, and reactive to light. No scleral icterus.   Neck: Normal range of motion. Neck supple. No JVD present. No thyromegaly present.   Cardiovascular: Normal rate and regular rhythm.    No murmur heard.  Pulmonary/Chest: Effort normal and breath sounds normal. No respiratory distress. She has no wheezes.   Abdominal: Soft. Bowel sounds are normal. She exhibits no distension. There is no tenderness.   Musculoskeletal: She exhibits no edema or tenderness.   Reduced range of motion bilateral hips due to pain   Neurological: She is alert and oriented to person, place, and time. No cranial nerve deficit. Coordination normal.   Skin: Skin is warm and dry.   Psychiatric: She has a normal mood and affect. Her behavior is normal.       Fluids    Intake/Output Summary (Last 24 hours) at 06/23/19 1457  Last data filed at 06/22/19 1600   Gross per 24 hour   Intake              500 ml   Output             2500 ml   Net            -2000 ml       Laboratory  Recent Labs      06/21/19   1645  06/22/19   0500  06/23/19   0530   WBC  7.1  4.0*  2.9*   RBC  3.33*  3.33*  3.11*   HEMOGLOBIN  10.2*  10.3*  9.5*   HEMATOCRIT  31.7*  32.9*  29.5*   MCV  95.2  98.8*  94.9   MCH  30.6  30.9  30.5   MCHC  32.2*  31.3*  32.2*   RDW  42.7  44.7  41.9   PLATELETCT  128*  122*  116*   MPV  10.0  10.0  10.4     Recent Labs      06/21/19   1645  06/22/19   0500  06/23/19   0530   SODIUM  134*  137  135   POTASSIUM  4.9  4.9  4.3   CHLORIDE  99  99  97   CO2  20  24  26   GLUCOSE  83  74  86   BUN  48*  51*  28*   CREATININE  12.31*  13.24*  7.92*   CALCIUM  7.9*  8.0*  8.0*                   Imaging  DX-CHEST-2 VIEWS   Final Result      Mild patchy lingular opacity may represent atelectasis or pneumonitis.      Linear retrocardiac opacities likely represent atelectasis.      Linear opacities in the peripheral left mid to lower lung zone may represent atelectasis  or scarring.      Stable cardiomegaly.      No pleural effusion is identified.            DX-CHEST-PORTABLE (1 VIEW)   Final Result      Rounded retrocardiac opacity not excluded. Further evaluation with PA and lateral plain films of the chest recommended.      Cardiomegaly and mild pulmonary vascular congestion.      Blunting of the left costophrenic angle may be related to pleural thickening or trace pleural fluid.         DX-HIP-BILATERAL-WITH PELVIS-3/4 VIEWS   Final Result      Successful relocation of right hip arthroplasty dislocation      Above average stool volume      Otherwise negative bilateral hip radiographs           Assessment/Plan  Avascular necrosis of bones of both hips (HCC)- (present on admission)   Assessment & Plan    She has a history of avascular necrosis of bilateral hips s/p R replacement ([facility] 1 year ago)  She has been following pain specialist  She needs a left hip replacement, admitted for progressive/uncontrolled pain  Wean dilaudid  Increase interval and dose of PO oxy 20-25 q6  PT/OT  Needs outpatient ortho follow up     ESRD (end stage renal disease) on dialysis ([facility])- (present on admission)   Assessment & Plan    She has a history of end-stage renal disease and she gets hemodialysis on Monday Wednesday and Friday.  HD performed 6/22  Dr. Byers     Hypertension- (present on admission)   Assessment & Plan    Continue amlodipine  Uncontrolled, >165 at times but she is labile and occ in 110s  Monitor for now     Anemia in chronic kidney disease, on chronic dialysis ([facility])- (present on admission)   Assessment & Plan    No evidence of blood loss, monitor  She is at her baseline of around 10     Opioid dependence ([facility])- (present on admission)   Assessment & Plan    She has been following pain doctor.          VTE prophylaxis: Heparin

## 2019-06-24 ENCOUNTER — PATIENT OUTREACH (OUTPATIENT)
Dept: HEALTH INFORMATION MANAGEMENT | Facility: OTHER | Age: 39
End: 2019-06-24

## 2019-06-24 VITALS
RESPIRATION RATE: 17 BRPM | HEART RATE: 81 BPM | BODY MASS INDEX: 29.75 KG/M2 | WEIGHT: 178.57 LBS | HEIGHT: 65 IN | TEMPERATURE: 98.3 F | DIASTOLIC BLOOD PRESSURE: 89 MMHG | OXYGEN SATURATION: 95 % | SYSTOLIC BLOOD PRESSURE: 150 MMHG

## 2019-06-24 LAB
ABO GROUP BLD: NORMAL
ALBUMIN SERPL BCP-MCNC: 2.5 G/DL (ref 3.2–4.9)
BARCODED ABORH UBTYP: 600
BARCODED ABORH UBTYP: 600
BARCODED PRD CODE UBPRD: NORMAL
BARCODED PRD CODE UBPRD: NORMAL
BARCODED UNIT NUM UBUNT: NORMAL
BARCODED UNIT NUM UBUNT: NORMAL
BASOPHILS # BLD AUTO: 1.1 % (ref 0–1.8)
BASOPHILS # BLD: 0.03 K/UL (ref 0–0.12)
BLD GP AB SCN SERPL QL: NORMAL
BUN SERPL-MCNC: 40 MG/DL (ref 8–22)
CALCIUM SERPL-MCNC: 7.4 MG/DL (ref 8.4–10.2)
CHLORIDE SERPL-SCNC: 104 MMOL/L (ref 96–112)
CO2 SERPL-SCNC: 23 MMOL/L (ref 20–33)
COMPONENT R 8504R: NORMAL
COMPONENT R 8504R: NORMAL
CREAT SERPL-MCNC: 8.86 MG/DL (ref 0.5–1.4)
EOSINOPHIL # BLD AUTO: 0.12 K/UL (ref 0–0.51)
EOSINOPHIL NFR BLD: 4.5 % (ref 0–6.9)
ERYTHROCYTE [DISTWIDTH] IN BLOOD BY AUTOMATED COUNT: 41.9 FL (ref 35.9–50)
GLUCOSE SERPL-MCNC: 101 MG/DL (ref 65–99)
HCT VFR BLD AUTO: 20.9 % (ref 37–47)
HGB BLD-MCNC: 6.8 G/DL (ref 12–16)
IMM GRANULOCYTES # BLD AUTO: 0 K/UL (ref 0–0.11)
IMM GRANULOCYTES NFR BLD AUTO: 0 % (ref 0–0.9)
LYMPHOCYTES # BLD AUTO: 0.84 K/UL (ref 1–4.8)
LYMPHOCYTES NFR BLD: 31.7 % (ref 22–41)
MCH RBC QN AUTO: 31 PG (ref 27–33)
MCHC RBC AUTO-ENTMCNC: 32.1 G/DL (ref 33.6–35)
MCV RBC AUTO: 96.8 FL (ref 81.4–97.8)
MONOCYTES # BLD AUTO: 0.33 K/UL (ref 0–0.85)
MONOCYTES NFR BLD AUTO: 12.5 % (ref 0–13.4)
NEUTROPHILS # BLD AUTO: 1.33 K/UL (ref 2–7.15)
NEUTROPHILS NFR BLD: 50.2 % (ref 44–72)
NRBC # BLD AUTO: 0 K/UL
NRBC BLD-RTO: 0 /100 WBC
PHOSPHATE SERPL-MCNC: 5.9 MG/DL (ref 2.5–4.5)
PLATELET # BLD AUTO: 86 K/UL (ref 164–446)
PMV BLD AUTO: 10.5 FL (ref 9–12.9)
POTASSIUM SERPL-SCNC: 3.9 MMOL/L (ref 3.6–5.5)
PRODUCT TYPE UPROD: NORMAL
PRODUCT TYPE UPROD: NORMAL
RBC # BLD AUTO: 2.16 M/UL (ref 4.2–5.4)
RH BLD: NORMAL
SODIUM SERPL-SCNC: 137 MMOL/L (ref 135–145)
UNIT STATUS USTAT: NORMAL
UNIT STATUS USTAT: NORMAL
WBC # BLD AUTO: 2.7 K/UL (ref 4.8–10.8)

## 2019-06-24 PROCEDURE — 96376 TX/PRO/DX INJ SAME DRUG ADON: CPT | Mod: XU

## 2019-06-24 PROCEDURE — A9270 NON-COVERED ITEM OR SERVICE: HCPCS | Performed by: INTERNAL MEDICINE

## 2019-06-24 PROCEDURE — 700101 HCHG RX REV CODE 250: Performed by: INTERNAL MEDICINE

## 2019-06-24 PROCEDURE — 80069 RENAL FUNCTION PANEL: CPT

## 2019-06-24 PROCEDURE — 36430 TRANSFUSION BLD/BLD COMPNT: CPT | Mod: XU

## 2019-06-24 PROCEDURE — 86902 BLOOD TYPE ANTIGEN DONOR EA: CPT | Mod: 91

## 2019-06-24 PROCEDURE — 97535 SELF CARE MNGMENT TRAINING: CPT

## 2019-06-24 PROCEDURE — 700102 HCHG RX REV CODE 250 W/ 637 OVERRIDE(OP): Performed by: INTERNAL MEDICINE

## 2019-06-24 PROCEDURE — 90935 HEMODIALYSIS ONE EVALUATION: CPT

## 2019-06-24 PROCEDURE — 86900 BLOOD TYPING SEROLOGIC ABO: CPT

## 2019-06-24 PROCEDURE — 99214 OFFICE O/P EST MOD 30 MIN: CPT | Performed by: INTERNAL MEDICINE

## 2019-06-24 PROCEDURE — 700111 HCHG RX REV CODE 636 W/ 250 OVERRIDE (IP): Performed by: INTERNAL MEDICINE

## 2019-06-24 PROCEDURE — 86901 BLOOD TYPING SEROLOGIC RH(D): CPT

## 2019-06-24 PROCEDURE — 99217 PR OBSERVATION CARE DISCHARGE: CPT | Performed by: INTERNAL MEDICINE

## 2019-06-24 PROCEDURE — 86850 RBC ANTIBODY SCREEN: CPT

## 2019-06-24 PROCEDURE — 85025 COMPLETE CBC W/AUTO DIFF WBC: CPT

## 2019-06-24 PROCEDURE — P9016 RBC LEUKOCYTES REDUCED: HCPCS

## 2019-06-24 PROCEDURE — 96375 TX/PRO/DX INJ NEW DRUG ADDON: CPT | Mod: XU

## 2019-06-24 PROCEDURE — 86922 COMPATIBILITY TEST ANTIGLOB: CPT | Mod: 91

## 2019-06-24 PROCEDURE — 96372 THER/PROPH/DIAG INJ SC/IM: CPT

## 2019-06-24 PROCEDURE — G0378 HOSPITAL OBSERVATION PER HR: HCPCS

## 2019-06-24 RX ORDER — OXYCODONE HYDROCHLORIDE 20 MG/1
20 TABLET ORAL EVERY 6 HOURS PRN
Qty: 28 TAB | Refills: 0 | Status: SHIPPED | OUTPATIENT
Start: 2019-06-24 | End: 2019-07-02 | Stop reason: SDUPTHER

## 2019-06-24 RX ORDER — HEPARIN SODIUM,PORCINE 10 UNIT/ML
20 VIAL (ML) INTRAVENOUS ONCE
Status: DISCONTINUED | OUTPATIENT
Start: 2019-06-24 | End: 2019-06-24

## 2019-06-24 RX ADMIN — SEVELAMER CARBONATE 3200 MG: 800 TABLET, FILM COATED ORAL at 11:32

## 2019-06-24 RX ADMIN — SENNOSIDES,DOCUSATE SODIUM 2 TABLET: 8.6; 5 TABLET, FILM COATED ORAL at 05:20

## 2019-06-24 RX ADMIN — OXYCODONE HYDROCHLORIDE 25 MG: 10 TABLET ORAL at 15:16

## 2019-06-24 RX ADMIN — OXYCODONE HYDROCHLORIDE 25 MG: 10 TABLET ORAL at 09:23

## 2019-06-24 RX ADMIN — PROMETHAZINE HYDROCHLORIDE 25 MG: 25 TABLET ORAL at 07:18

## 2019-06-24 RX ADMIN — PREGABALIN 75 MG: 25 CAPSULE ORAL at 05:21

## 2019-06-24 RX ADMIN — OXYCODONE HYDROCHLORIDE 25 MG: 10 TABLET ORAL at 03:32

## 2019-06-24 RX ADMIN — SEVELAMER CARBONATE 3200 MG: 800 TABLET, FILM COATED ORAL at 07:18

## 2019-06-24 RX ADMIN — OMEPRAZOLE 20 MG: 20 CAPSULE, DELAYED RELEASE ORAL at 05:21

## 2019-06-24 RX ADMIN — HEPARIN 500 UNITS: 100 SYRINGE at 15:17

## 2019-06-24 RX ADMIN — HYDROMORPHONE HYDROCHLORIDE 0.5 MG: 1 INJECTION, SOLUTION INTRAMUSCULAR; INTRAVENOUS; SUBCUTANEOUS at 00:04

## 2019-06-24 RX ADMIN — AMLODIPINE BESYLATE 10 MG: 5 TABLET ORAL at 05:22

## 2019-06-24 RX ADMIN — HEPARIN SODIUM 5000 UNITS: 5000 INJECTION, SOLUTION INTRAVENOUS; SUBCUTANEOUS at 05:22

## 2019-06-24 RX ADMIN — HEPARIN SODIUM 3000 UNITS: 1000 INJECTION, SOLUTION INTRAVENOUS; SUBCUTANEOUS at 07:22

## 2019-06-24 RX ADMIN — LIDOCAINE 1 PATCH: 50 PATCH TOPICAL at 05:22

## 2019-06-24 RX ADMIN — HYDROMORPHONE HYDROCHLORIDE 0.5 MG: 1 INJECTION, SOLUTION INTRAMUSCULAR; INTRAVENOUS; SUBCUTANEOUS at 11:31

## 2019-06-24 RX ADMIN — HYDROMORPHONE HYDROCHLORIDE 0.5 MG: 1 INJECTION, SOLUTION INTRAMUSCULAR; INTRAVENOUS; SUBCUTANEOUS at 06:09

## 2019-06-24 RX ADMIN — PREGABALIN 75 MG: 25 CAPSULE ORAL at 11:32

## 2019-06-24 RX ADMIN — FERROUS SULFATE TAB 325 MG (65 MG ELEMENTAL FE) 325 MG: 325 (65 FE) TAB at 05:21

## 2019-06-24 RX ADMIN — ERYTHROPOIETIN 4000 UNITS: 2000 INJECTION, SOLUTION INTRAVENOUS; SUBCUTANEOUS at 07:23

## 2019-06-24 RX ADMIN — OXYCODONE HYDROCHLORIDE AND ACETAMINOPHEN 500 MG: 500 TABLET ORAL at 05:23

## 2019-06-24 ASSESSMENT — ENCOUNTER SYMPTOMS
CHILLS: 0
FEVER: 0

## 2019-06-24 ASSESSMENT — ACTIVITIES OF DAILY LIVING (ADL): TOILETING: INDEPENDENT

## 2019-06-24 NOTE — DISCHARGE INSTRUCTIONS
Discharge Instructions    Discharged to home by car with relative. Discharged via wheelchair, hospital escort: Yes.  Special equipment needed: Not Applicable    Be sure to schedule a follow-up appointment with your primary care doctor or any specialists as instructed.     Discharge Plan:   Diet Plan: Discussed  Activity Level: Discussed  Confirmed Follow up Appointment: Patient to Call and Schedule Appointment  Confirmed Symptoms Management: Discussed  Medication Reconciliation Updated: Yes  Influenza Vaccine Indication: Patient Refuses    I understand that a diet low in cholesterol, fat, and sodium is recommended for good health. Unless I have been given specific instructions below for another diet, I accept this instruction as my diet prescription.   Other diet: Renal    Special Instructions: None    · Is patient discharged on Warfarin / Coumadin?   No     Depression / Suicide Risk    As you are discharged from this [facility], it is important to learn how to keep safe from harming yourself.    Recognize the warning signs:  · Abrupt changes in personality, positive or negative- including increase in energy   · Giving away possessions  · Change in eating patterns- significant weight changes-  positive or negative  · Change in sleeping patterns- unable to sleep or sleeping all the time   · Unwillingness or inability to communicate  · Depression  · Unusual sadness, discouragement and loneliness  · Talk of wanting to die  · Neglect of personal appearance   · Rebelliousness- reckless behavior  · Withdrawal from people/activities they love  · Confusion- inability to concentrate     If you or a loved one observes any of these behaviors or has concerns about self-harm, here's what you can do:  · Talk about it- your feelings and reasons for harming yourself  · Remove any means that you might use to hurt yourself (examples: pills, rope, extension cords, firearm)  · Get professional help from the community  (Mental Health, Substance Abuse, psychological counseling)  · Do not be alone:Call your Safe Contact- someone whom you trust who will be there for you.  · Call your local CRISIS HOTLINE 761-7268 or 315-037-3573  · Call your local Children's Mobile Crisis Response Team [place] (807) 433-7070 or www.[employer]  · Call the toll free National Suicide Prevention Hotlines   · National Suicide Prevention Lifeline 000-579-XGDD (8618)  · National Hope Line Network 800-SUICIDE (526-6583)

## 2019-06-24 NOTE — PROGRESS NOTES
Nephrology Daily Progress Note    Date of Service  6/24/2019    Chief Complaint  38 y.o. female with a history of ESRD on hemodialysis Monday Wednesday Friday, due to lupus, and a history of avascular necrosis who presented 6/21/2019 with worsening left hip pain.      Interval Problem Update  6/23 -patient had dialysis last night with 2 L removed.  Patient seen this afternoon, hip pain still present, but slightly improved.  Otherwise no new complaints.  Patient is interested in learning more about peritoneal dialysis.  6/24 - Tells me her pain is better. Her understanding is that she will be discharged today possibly.    Review of Systems  Review of Systems   Constitutional: Negative for chills and fever.   All other systems reviewed and are negative.       Physical Exam  Temp:  [36.4 °C (97.6 °F)-37.1 °C (98.8 °F)] 37.1 °C (98.8 °F)  Pulse:  [72-84] 73  Resp:  [18-20] 20  BP: (141-184)/() 141/79  SpO2:  [90 %-96 %] 95 %    Physical Exam   Constitutional: She is oriented to person, place, and time. She appears well-developed and well-nourished.   HENT:   Head: Normocephalic and atraumatic.   Cardiovascular: Normal rate and regular rhythm.    Pulmonary/Chest: Effort normal and breath sounds normal.   Abdominal: Soft. Bowel sounds are normal.   Musculoskeletal: She exhibits no edema or tenderness.   Neurological: She is alert and oriented to person, place, and time.   Skin: Skin is warm and dry.   Access: Right brachial axillary AV graft with patent bruit and thrill      Fluids    Intake/Output Summary (Last 24 hours) at 06/24/19 0624  Last data filed at 06/24/19 0010   Gross per 24 hour   Intake              180 ml   Output                0 ml   Net              180 ml       Laboratory  Labs reviewed, pertinent labs below.  Recent Labs      06/22/19   0500  06/23/19   0530  06/24/19   0330   WBC  4.0*  2.9*  2.7*   RBC  3.33*  3.11*  2.16*   HEMOGLOBIN  10.3*  9.5*  6.8*   HEMATOCRIT  32.9*  29.5*  20.9*   MCV   98.8*  94.9  96.8   MCH  30.9  30.5  31.0   MCHC  31.3*  32.2*  32.1*   RDW  44.7  41.9  41.9   PLATELETCT  122*  116*  86*   MPV  10.0  10.4  10.5     Recent Labs      06/22/19   0500  06/23/19   0530  06/24/19   0330   SODIUM  137  135  137   POTASSIUM  4.9  4.3  3.9   CHLORIDE  99  97  104   CO2  24  26  23   GLUCOSE  74  86  101*   BUN  51*  28*  40*   CREATININE  13.24*  7.92*  8.86*   CALCIUM  8.0*  8.0*  7.4*               URINALYSIS:    Lab Results  Component Value Date/Time   COLORURINE Yellow 01/01/2019 0705   CLARITY Clear 01/01/2019 0705   SPECGRAVITY 1.011 01/01/2019 0705   PHURINE 7.5 01/01/2019 0705   KETONES Negative 01/01/2019 0705   PROTEINURIN 300 (A) 01/01/2019 0705   BILIRUBINUR Negative 01/01/2019 0705   UROBILU 0.2 01/01/2019 0705   NITRITE Negative 01/01/2019 0705   LEUKESTERAS Trace (A) 01/01/2019 0705   OCCULTBLOOD Negative 01/01/2019 0705     UPC    Lab Results  Component Value Date/Time   TOTPROTUR 65.0 (H) 01/21/2015 1520      Lab Results  Component Value Date/Time   CREATININEU 65.60 01/21/2015 1520         Imaging reviewed  DX-CHEST-2 VIEWS   Final Result      Mild patchy lingular opacity may represent atelectasis or pneumonitis.      Linear retrocardiac opacities likely represent atelectasis.      Linear opacities in the peripheral left mid to lower lung zone may represent atelectasis or scarring.      Stable cardiomegaly.      No pleural effusion is identified.            DX-CHEST-PORTABLE (1 VIEW)   Final Result      Rounded retrocardiac opacity not excluded. Further evaluation with PA and lateral plain films of the chest recommended.      Cardiomegaly and mild pulmonary vascular congestion.      Blunting of the left costophrenic angle may be related to pleural thickening or trace pleural fluid.         DX-HIP-BILATERAL-WITH PELVIS-3/4 VIEWS   Final Result      Successful relocation of right hip arthroplasty dislocation      Above average stool volume      Otherwise negative  bilateral hip radiographs            Current Facility-Administered Medications   Medication Dose Route Frequency Provider Last Rate Last Dose   • HYDROmorphone pf (DILAUDID) injection 0.5 mg  0.5 mg Intravenous Q6HRS PRN Kaylan Chapa D.O.   0.5 mg at 06/24/19 0609   • oxyCODONE immediate release (ROXICODONE) tablet 20-25 mg  20-25 mg Oral Q6HRS PRN Kaylan Chapa D.O.   25 mg at 06/24/19 0332   • epoetin rk (EPOGEN,PROCRIT) injection 4,000 Units  50 Units/kg Intravenous MO, WE + FR Bora Douglas M.D.       • heparin injection 3,000 Units  3,000 Units Intravenous DIALYSIS PRN Bora Douglas M.D.   3,000 Units at 06/22/19 1440   • promethazine (PHENERGAN) tablet 25 mg  25 mg Oral Q6HRS PRN Kaylan Chapa D.O.   25 mg at 06/23/19 1134   • senna-docusate (PERICOLACE or SENOKOT S) 8.6-50 MG per tablet 2 Tab  2 Tab Oral BID Haylie Coronado M.D.   2 Tab at 06/24/19 0520    And   • polyethylene glycol/lytes (MIRALAX) PACKET 1 Packet  1 Packet Oral QDAY PRN Haylie Coronado M.D.        And   • magnesium hydroxide (MILK OF MAGNESIA) suspension 30 mL  30 mL Oral QDAY PRN Haylie Coronado M.D.        And   • bisacodyl (DULCOLAX) suppository 10 mg  10 mg Rectal QDAY PRN Haylie Coronado M.D.       • heparin injection 5,000 Units  5,000 Units Subcutaneous Q8HRS Haylie Coronado M.D.   5,000 Units at 06/24/19 0522   • acetaminophen (TYLENOL) tablet 650 mg  650 mg Oral Q6HRS PRN Haylie Coronado M.D.   650 mg at 06/22/19 1512   • amLODIPine (NORVASC) tablet 10 mg  10 mg Oral DAILY Haylie Coronado M.D.   10 mg at 06/24/19 0522   • ascorbic acid tablet 500 mg  500 mg Oral DAILY Haylie Coronado M.D.   500 mg at 06/24/19 0523   • vitamin D (cholecalciferol) tablet 10,000 Units  10,000 Units Oral DAILY Kaylan Chapa D.O.       • ferrous sulfate tablet 325 mg  325 mg Oral DAILY Haylie Coronado M.D.   325 mg at 06/24/19 0521   • hydroxychloroquine (PLAQUENIL) tablet 200  mg  200 mg Oral QHS Haylie Coronado M.D.   200 mg at 06/23/19 2015   • lidocaine (LIDODERM) 5 % 1 Patch  1 Patch Transdermal DAILY Haylie Coronado M.D.   1 Patch at 06/24/19 0522   • omeprazole (PRILOSEC) capsule 20 mg  20 mg Oral DAILY Haylie Coronado M.D.   20 mg at 06/24/19 0521   • pregabalin (LYRICA) capsule 75 mg  75 mg Oral TID Haylie Coronado M.D.   75 mg at 06/24/19 0521   • sevelamer carbonate (RENVELA) tablet 3,200 mg  3,200 mg Oral TID WITH MEALS Haylie Coronado M.D.   3,200 mg at 06/23/19 1712   • tizanidine (ZANAFLEX) tablet 8 mg  8 mg Oral QHS Haylie Coronado M.D.   8 mg at 06/23/19 2015   • traZODone (DESYREL) tablet 50 mg  50 mg Oral QHS Haylie Coronado M.D.   50 mg at 06/23/19 2015   • hydrALAZINE (APRESOLINE) injection 10 mg  10 mg Intravenous Q6HRS PRN Haylie Coronado M.D.       • sevelamer carbonate (RENVELA) tablet 1,600 mg  1,600 mg Oral With Snacks PRN Haylie Coronado M.D.       • NS (BOLUS) infusion 250 mL  250 mL Intravenous DIALYSIS PRN Bora Douglas M.D.       • albumin human 25% solution 12.5 g  12.5 g Intravenous DIALYSIS PRN Bora Douglas M.D.       • lidocaine (XYLOCAINE) 1 % injection 1 mL  1 mL Intradermal PRN Bora Douglas M.D.             Assessment/Plan  38 y.o. female with a history of ESRD on hemodialysis Monday Wednesday Friday, due to lupus, and a history of avascular necrosis who presented 6/21/2019 with worsening left hip pain.     1.  ESRD on hemodialysis Monday Wednesday Friday, stable.  No plans for dialysis today.  Plan for next dialysis tomorrow.  Patient is oliguric to nonoliguric.  Avoid nephrotoxins.  Check labs daily.  Patient is interested in learning more about peritoneal dialysis.  I have made a referral to the kidney smart program, but she could also just walk across the [place] from the hemodialysis unit to the peritoneal dialysis unit to learn more.     2.  Left hip pain, with history of avascular  necrosis, stable to slightly improved.  Patient has had right hip replacement in the past.  Patient follows with chronic pain clinic and orthopedic surgery.     3.  Anemia of chronic disease, worsening.  Start Epogen 4000 units 3 times weekly at dialysis.  Check labs daily.    4.  Hyponatremia, stable.  Limit hypotonic fluids.  Check labs daily.     5.  Mild metabolic acidosis, improved after dialysis.  Check labs daily.     6.  Hypertension, still mildly uncontrolled.  Likely due to pain.  Continue current therapy.        - HD today  -Likely discharge after HD to follow up in the HD unit

## 2019-06-24 NOTE — PROGRESS NOTES
[facility] Progress Note      HD ordered by Dr. Gotti. Treatment started at 0708 and ended at 1008.     Total Net UF 2000mL.    Pt tolerated treatment well with no issues. See paper flow sheet for details. Cannulation needles taken out, held pressure for 10 min and placed gauze dressing with no bleeding. SHELBI AVG + for bruit/thrill. Report given to GIOVANNA Yeh RN.

## 2019-06-24 NOTE — CARE PLAN
Problem: Safety  Goal: Will remain free from falls  Outcome: PROGRESSING AS EXPECTED      Problem: Venous Thromboembolism (VTW)/Deep Vein Thrombosis (DVT) Prevention:  Goal: Patient will participate in Venous Thrombosis (VTE)/Deep Vein Thrombosis (DVT)Prevention Measures  Outcome: PROGRESSING AS EXPECTED   06/24/19 0000   Mechanical/VTE Prophylaxis   Mechanical Prophylaxis  SCDs, Sequential Compression Device   SCDs, Sequential Compression Device On   OTHER   Pharmacologic Prophylaxis Used Unfractionated Heparin       Problem: Knowledge Deficit  Goal: Knowledge of disease process/condition, treatment plan, diagnostic tests, and medications will improve  Outcome: PROGRESSING AS EXPECTED

## 2019-06-24 NOTE — DIETARY
"Nutrition services: Day 0 of admit.  Debby Carrizales is a 38 y.o. female with admitting DX of Left Hip Pain  Consult received for nutrition screen trigger of unplanned weight loss, pt preference.    Assessment:  Height: 165.1 cm (5' 5\")  Weight: 81 kg (178 lb 9.2 oz)  Body mass index is 29.72 kg/m²., BMI classification: Overweight  Diet/Intake: Renal.  Recorded PO intake: 50-75% x 1 meal and % x 3 meals.    Evaluation:   1. History of Lupus, ESRD on HD.  2. Pt states she is leaving today.  No problems with meals currently.  Good PO intake recorded.  3. Reviewed weight history.  Weight upon admit at [facility] = 80.2 kg on standing scale.  Current weight consistent at 81 kg on standing scale.    Malnutrition Risk: No criteria noted at this time.    Recommendations/Plan:  1. Renal diet.   2. Encourage continued intake of at least 50% of meals.  3. Document intake of all PO as % taken in ADL's to provide interdisciplinary communication across all shifts.   4. Monitor weight.  5. Nutrition rep will continue to see patient for ongoing meal and snack preferences.             "

## 2019-06-24 NOTE — CARE PLAN
Problem: Knowledge Deficit  Goal: Knowledge of disease process/condition, treatment plan, diagnostic tests, and medications will improve  POC discussed. Pt understands the plan is to transfuse Hgb. All questions and concerns addressed.    Problem: Pain Management  Goal: Pain level will decrease to patient's comfort goal  Will control pain with Dilaudid and Oxy meds.

## 2019-06-24 NOTE — PROGRESS NOTES
Pt is A&Ox4. VSS. Pt c/o 7/10 pain in hips. Medicated per MAR. VSS. Pt requesting to rest at this time. Fall precautions in place.

## 2019-06-24 NOTE — DISCHARGE SUMMARY
Discharge Summary    CHIEF COMPLAINT ON ADMISSION  Chief Complaint   Patient presents with   • T-5000 FALL   • Hip Pain   • Fever       Reason for Admission  Hip Pain;Fever     Admission Date  6/21/2019    CODE STATUS  Full Code    HPI & HOSPITAL COURSE  This is a 38 y.o. Female with a very complicated past medical history including lupus complicate a by end-stage renal disease on dialysis Monday Wednesday Friday, avascular necrosis of bilateral hip status post right hip replacement in the past, chronic pain with narcotic dependence, uncontrolled hypertension admitted with severe left hip pain.  She has known avascular necrosis and has seen orthopedic surgery in the past for her right hip placement however, she has been unable to make a final payment therefore is unable to get surgery done for her left hip.  She underwent dialysis according to her normal schedule.  Her pain medications were increased.  At home she takes oxycodone 15 mg every 8 hours and she was increased to 20 mg every 6 hours.  This was able to control her pain.  Ultimately, she does need another surgery however currently finances are limiting this.    Her pain was controlled on the higher doses of oxycodone at the time of discharge.  She was recommended to follow-up with orthopedic surgery to discuss a payment plan.  She will follow-up with her primary care physician.  She will continue to follow-up with nephrology.    Of note, she did have a drop in her hemoglobin and states that she had a heavy period which is unusual for her as she has not been treated for several years.  Her baseline hemoglobin is low due to end-stage renal disease.  She was transfused 1 unit of RBCs which she tolerated well.  She was discharged following this and will follow up her hemoglobin outpatient with nephrology    Therefore, she is discharged in fair and stable condition to home with close outpatient follow-up.    The patient met 2-midnight criteria for an inpatient  stay at the time of discharge.    Discharge Date  6/24/2019    FOLLOW UP ITEMS POST DISCHARGE  Follow-up with nephrology  Follow-up with orthopedic surgery  F/U with pain management    DISCHARGE DIAGNOSES  Active Problems:    ESRD (end stage renal disease) on dialysis (HCC) POA: Yes    Avascular necrosis of bones of both hips (HCC) POA: Yes    Hypertension POA: Yes    Opioid dependence (HCC) POA: Yes    Anemia in chronic kidney disease, on chronic dialysis (HCC) POA: Yes  Resolved Problems:    * No resolved hospital problems. *      FOLLOW UP  Future Appointments  Date Time Provider Department Center   7/2/2019 3:40 PM Sushila Manzano M.D. [address]   7/11/2019 9:20 AM Quinn Chandler M.D. [facility] None   10/8/2019 11:00 AM Sushila Manzano M.D. [address] M.D.  [address]  [address]  [facility] [address]  [phone]            MEDICATIONS ON DISCHARGE     Medication List      CHANGE how you take these medications      Instructions   * Oxycodone HCl 20 MG Tabs  What changed:  Another medication with the same name was added. Make sure you understand how and when to take each.   Take 1/2-1 every 8 hours (up to 2.5 pills/day) with 1 additional pill on dialysis days (3 days/week)     * Oxycodone HCl 20 MG Tabs  What changed:  You were already taking a medication with the same name, and this prescription was added. Make sure you understand how and when to take each.   Take 1 Tab by mouth every 6 hours as needed for up to 14 days.  Dose:  20 mg        * This list has 2 medication(s) that are the same as other medications prescribed for you. Read the directions carefully, and ask your doctor or other care provider to review them with you.            CONTINUE taking these medications      Instructions   amLODIPine 10 MG Tabs  Commonly known as:  NORVASC   Take 10 mg by mouth 1 time daily as needed.  Dose:  10 mg     ascorbic acid 500 MG Tabs  Commonly known as:  ascorbic acid   Take 500 mg  by mouth every day.  Dose:  500 mg     cholecalciferol 5000 UNIT Caps  Commonly known as:  VITAMIN D3   Take 10,000 Units by mouth every day.  Dose:  98692 Units     docusate sodium 100 MG Caps  Commonly known as:  COLACE   Take 100 mg by mouth every day.  Dose:  100 mg     ferrous sulfate 325 (65 Fe) MG tablet   Take 325 mg by mouth every day.  Dose:  325 mg     hydroxychloroquine 200 MG Tabs  Commonly known as:  PLAQUENIL   Take 200 mg by mouth every bedtime.  Dose:  200 mg     lidocaine 5 % Ptch  Commonly known as:  LIDODERM   Apply 1 Patch to skin as directed every 12 hours. On for 12 hours off for 12 hours  Dose:  1 Patch     omeprazole 20 MG delayed-release capsule  Commonly known as:  PRILOSEC   Take 1 Cap by mouth every day.  Dose:  20 mg     pregabalin 75 MG Caps  Commonly known as:  LYRICA   Take 1 Cap by mouth 3 times a day for 30 days.  Dose:  75 mg     promethazine 25 MG Tabs  Commonly known as:  PHENERGAN   Take 1 Tab by mouth every 8 hours as needed for Nausea/Vomiting.  Dose:  25 mg     RENVELA 800 MG Tabs tablet  Generic drug:  sevelamer carbonate   Take 1,600-3,200 mg by mouth See Admin Instructions. 1,600 mg with snacks  3,200 mg with meals  Dose:  5175-6215 mg     tizanidine 4 MG Tabs  Commonly known as:  ZANAFLEX   Take 8 mg by mouth every bedtime.  Dose:  8 mg     traZODone 50 MG Tabs  Commonly known as:  DESYREL   Take 1 Tab by mouth every bedtime.  Dose:  50 mg            Allergies  Allergies   Allergen Reactions   • Lorazepam [Ativan] Anxiety     Patient becomes severely paranoid and agitated   • Morphine Itching     Tolerates Dilaudid   • Seasonal Runny Nose and Itching     Hay fever, sabiha brush       DIET  Orders Placed This Encounter   Procedures   • Diet Order Renal     Standing Status:   Standing     Number of Occurrences:   1     Order Specific Question:   Diet:     Answer:   Renal [8]       ACTIVITY  As tolerated.  Weight bearing as tolerated    CONSULTATIONS  Nephrology -  [facility]    PROCEDURES  None    LABORATORY  Lab Results   Component Value Date    SODIUM 137 06/24/2019    POTASSIUM 3.9 06/24/2019    CHLORIDE 104 06/24/2019    CO2 23 06/24/2019    GLUCOSE 101 (H) 06/24/2019    BUN 40 (H) 06/24/2019    CREATININE 8.86 (HH) 06/24/2019    CREATININE 0.9 12/13/2008        Lab Results   Component Value Date    WBC 2.7 (L) 06/24/2019    HEMOGLOBIN 6.8 (L) 06/24/2019    HEMATOCRIT 20.9 (L) 06/24/2019    PLATELETCT 86 (L) 06/24/2019        Total time of the discharge process exceeds 42 minutes.

## 2019-06-24 NOTE — THERAPY
Occupational therapy order received. Attempted OT eval. Pt currently receiving dialysis and then will undergo blood transfusion due to Hbg of 6.8. Will re-attempt OT eval.

## 2019-06-24 NOTE — PROGRESS NOTES
Pt is AAO x4.  Pt reports a 7/10 L hip pain level.  Understands when next pain med is due.  Reports nausea, medicated.  VS WNL.  L port in place, saline locked.   R AV fistula in place, + bruit/thrill.  Currently receiving Dialysis.   POC discussed.  All needs met at this time.  Bed in low position.  Call light within reach.  Rounding in place.

## 2019-06-24 NOTE — PROGRESS NOTES
Pt discharged to home via wheelchair escorted with transport.  All discharge instructions given, questions and concerns addressed.   All prescriptions given.   Port deaccessed without complications.   Flushed with heparin.  1 unit RBC completed before DC.  Follow up appt discussed.   All belongings with pt.

## 2019-06-25 ENCOUNTER — PATIENT OUTREACH (OUTPATIENT)
Dept: HEALTH INFORMATION MANAGEMENT | Facility: OTHER | Age: 39
End: 2019-06-25

## 2019-06-25 NOTE — PROGRESS NOTES
6/25/19 12:45pm:  CM post discharge outreach call first attempt.  VM left requesting return call to  at [phone].    6/26/19 9:30am:  CM post discharge outreach call second attempt.  VM left requesting a return call to  at [phone].

## 2019-06-26 LAB
BACTERIA BLD CULT: NORMAL
BACTERIA BLD CULT: NORMAL
BACTERIA UR CULT: NORMAL
SIGNIFICANT IND 70042: NORMAL
SITE SITE: NORMAL
SOURCE SOURCE: NORMAL

## 2019-06-27 ENCOUNTER — TELEPHONE (OUTPATIENT)
Dept: PHYSICAL MEDICINE AND REHAB | Facility: MEDICAL CENTER | Age: 39
End: 2019-06-27

## 2019-06-27 NOTE — TELEPHONE ENCOUNTER
Patient called and leave a voicemail to let you know she was in the hospital and also she asked for a pain medication, I called her back twice and leave a voicemail to call me back to find out more details and she did not call.

## 2019-07-02 ENCOUNTER — OFFICE VISIT (OUTPATIENT)
Dept: PHYSICAL MEDICINE AND REHAB | Facility: MEDICAL CENTER | Age: 39
End: 2019-07-02
Payer: MEDICARE

## 2019-07-02 VITALS
DIASTOLIC BLOOD PRESSURE: 98 MMHG | SYSTOLIC BLOOD PRESSURE: 138 MMHG | TEMPERATURE: 97.3 F | HEIGHT: 65 IN | WEIGHT: 178.79 LBS | HEART RATE: 75 BPM | BODY MASS INDEX: 29.79 KG/M2 | OXYGEN SATURATION: 98 %

## 2019-07-02 DIAGNOSIS — M87.052 AVASCULAR NECROSIS OF BONES OF BOTH HIPS (HCC): ICD-10-CM

## 2019-07-02 DIAGNOSIS — M87.051 AVASCULAR NECROSIS OF BONES OF BOTH HIPS (HCC): ICD-10-CM

## 2019-07-02 DIAGNOSIS — F11.20 UNCOMPLICATED OPIOID DEPENDENCE (HCC): ICD-10-CM

## 2019-07-02 DIAGNOSIS — Z99.2 ESRD (END STAGE RENAL DISEASE) ON DIALYSIS (HCC): ICD-10-CM

## 2019-07-02 DIAGNOSIS — M79.7 FIBROMYALGIA: ICD-10-CM

## 2019-07-02 DIAGNOSIS — N18.6 ESRD (END STAGE RENAL DISEASE) ON DIALYSIS (HCC): ICD-10-CM

## 2019-07-02 PROCEDURE — 99214 OFFICE O/P EST MOD 30 MIN: CPT | Performed by: PHYSICAL MEDICINE & REHABILITATION

## 2019-07-02 RX ORDER — OXYCODONE HYDROCHLORIDE 20 MG/1
1 TABLET ORAL EVERY 6 HOURS
Qty: 108 TAB | Refills: 0 | Status: SHIPPED | OUTPATIENT
Start: 2019-07-30 | End: 2019-07-28

## 2019-07-02 RX ORDER — PREGABALIN 75 MG/1
75 CAPSULE ORAL 3 TIMES DAILY
Qty: 90 CAP | Refills: 1 | Status: SHIPPED | OUTPATIENT
Start: 2019-08-13 | End: 2019-09-12

## 2019-07-02 RX ORDER — LACOSAMIDE 100 MG/1
100 TABLET ORAL 2 TIMES DAILY
Status: ON HOLD | COMMUNITY
End: 2019-10-05 | Stop reason: SDUPTHER

## 2019-07-02 RX ORDER — OXYCODONE HYDROCHLORIDE 20 MG/1
20 TABLET ORAL EVERY 6 HOURS PRN
Qty: 108 TAB | Refills: 0 | Status: SHIPPED | OUTPATIENT
Start: 2019-07-02 | End: 2019-07-28

## 2019-07-02 ASSESSMENT — PATIENT HEALTH QUESTIONNAIRE - PHQ9: CLINICAL INTERPRETATION OF PHQ2 SCORE: 0

## 2019-07-02 ASSESSMENT — PAIN SCALES - GENERAL: PAINLEVEL: 8=MODERATE-SEVERE PAIN

## 2019-07-02 NOTE — PROGRESS NOTES
Follow up patient note  Pain Medicine, Interventional spine and sports physiatry, Physical medicine rehabilitation      Chief complaint:   Left hip pain, low back pain      HISTORY      HPI  Patient identification/Interval histotry:     Debby Carrizales 38 y.o. female who has chronic pain related to rheumatologic disease, peripheral neuropathy and AVN hips, lupus.       She reports that she has been in the hospital since the last visit.  At that time, she was treated for pain related to dislocation -relocation of the right total hip.  Her left hip pain has been more severe.  She has plans to pay a bill with her orthopedic surgeon, Dr. Holman prior to plans for left total hip replacement second to AVN.  The left hip pain is worse with weight-bearing.  She is using a walker to help decrease pain with ambulation.  While she was in the hospital, they increased the dose of her pain medications.    No recent nausea or intermittent low-grade fevers.   This feeling usually improves after dialysis, but has not been present lately.    She has been using lidocaine patches on her low back.  The lyrica seems to be helping with her burning pain.  No side effects.     She has been having some trouble with getting stronger after her last hospital stay.  This is gradually improving.  Pain still seems to limit.     She is on the [facility] transplant list.    No side effects from oxycodone, mild constipation is managed with colace and a miralax equivalent.  She is having regular bowel movements on a daily basis.        ORT: 3  PHQ-9:0    ROS   Unchanged from 05/16/2019 except as noted in HPI     Red Flags :   Chills, Sweats:No fevers, chills and night sweats.  She does report some low-grade fevers  Involuntary Weight Loss: Denies  Bowel/Bladder Incontinence: Denies  Saddle Anesthesia: Denies    PMHx:   Past Medical History:   Diagnosis Date   • Arthritis     all joints,r/t lupus   • Avascular necrosis of bones of both hips (HCC)  10/10/2016   • Clostridium difficile colitis 5/3/2011   • Dialysis patient    • ESBL (extended spectrum beta-lactamase) producing bacteria infection 8/25/2014   • Fibromyalgia    • Hypertension    • Lupus    • Pneumonia    • Psychiatric disorder     anxiety, depression   • Pyelonephritis 11/21/2017   • Renal failure    • Sepsis due to pneumonia (HCC) 6/7/2018   • Status epilepticus (HCC) 12/13/2018       PSHx:   Past Surgical History:   Procedure Laterality Date   • AV FISTULA REVISION Right 8/11/2018    Procedure: AV FISTULA REVISION- Graft and Thrombectomy;  Surgeon: Shabbir Ardon M.D.;  Location: [facility];  Service: General   • AV FISTULA THROMBOLYSIS Right 8/11/2018    Procedure: AV FISTULA THROMBOLYSIS;  Surgeon: Shabbir Ardon M.D.;  Location: [facility];  Service: General   • AV FISTULA CREATION Right 12/9/2017    Procedure: AV FISTULA CREATION-ARM FOR GRAFT;  Surgeon: Lesly Jansen M.D.;  Location: [facility];  Service: General   • THROMBECTOMY  12/9/2017    Procedure: THROMBECTOMY;  Surgeon: Lesly Jansen M.D.;  Location: [facility];  Service: General   • THROMBECTOMY Right 8/21/2016    Procedure: THROMBECTOMY - right AV fistula graft with grams;  Surgeon: Shabbir Ardon M.D.;  Location: [facility];  Service:    • ANGIOPLASTY BALLOON  8/21/2016    Procedure: ANGIOPLASTY BALLOON;  Surgeon: Shabbir Ardon M.D.;  Location: [facility];  Service:    • THROMBECTOMY Right 8/20/2016    Procedure: THROMBECTOMY AV GRAFT;  Surgeon: Shabbir Ardon M.D.;  Location: [facility];  Service:    • AV FISTULA CREATION Right 7/12/2016    Procedure: AV FISTULA CREATION WITH GRAFT BRACHIAL AXILLARY;  Surgeon: Shabbir Ardon M.D.;  Location: [facility];  Service:    • CLOSED REDUCTION Right 7/5/2016    Procedure: CLOSED REDUCTION- Hip ;  Surgeon: Michael Holman M.D.;  Location: [facility]  [facility];  Service:    • HIP ARTHROPLASTY TOTAL Right 1/18/2016    Procedure: HIP ARTHROPLASTY TOTAL;  Surgeon: Michael Holman M.D.;  Location: SURGERY [facility];  Service:    • AV FISTULA CREATION  2/3/2015    Performed by Shabbir Ardon M.D. at SURGERY [facility]   • AV FISTULA CREATION  11/14/2014    Performed by Shabbir Ardon M.D. at SURGERY [facility]   • AV FISTULA CREATION  9/9/2014    Performed by Shabbir Ardon M.D. at SURGERY [facility]   • CATH PLACEMENT  9/9/2014    Performed by Shabbir Ardon M.D. at SURGERY [facility]   • OTHER  5/2011    tracheostomy   • GASTROSCOPY-ENDO  4/27/2011    Performed by PALMIRA DOAN at ENDOSCOPY [facility]   • COLONOSCOPY WITH BIOPSY  4/20/2011    Performed by ALCIRA CRUZ at ENDOSCOPY [facility]   • GASTROSCOPY-ENDO  4/18/2011    Performed by ALCIRA CRUZ at ENDOSCOPY [facility]   • GASTROSCOPY-ENDO  4/10/2011    Performed by SYED JURADO at ENDOSCOPY [facility]   • SCLEROTHERAPHY  4/10/2011    Performed by SYED JURADO at ENDOSCOPY [facility]   • OTHER ABDOMINAL SURGERY      kidney biopsy   • TRACHEOSTOMY         Family history   Denies neuromuscular disease  Family History   Problem Relation Age of Onset   • Heart Disease Mother    • Cancer Father        Medications:   Outpatient Prescriptions Marked as Taking for the 7/2/19 encounter (Office Visit) with Quinn Chandler M.D.   Medication Sig Dispense Refill   • [START ON 7/30/2019] Oxycodone HCl 20 MG Tab 1 Tab by PO/Feeding Tube route every 6 hours for 28 days. Take 1 every 6 hours 108 Tab 0   • lacosamide (VIMPAT) 100 MG Tab tablet Take  by mouth 2 Times a Day.     • Oxycodone HCl 20 MG Tab Take 1 Tab by mouth every 6 hours as needed for up to 28 days. 108 Tab 0   • [START ON 8/13/2019] pregabalin (LYRICA) 75 MG Cap Take 1 Cap by mouth 3 times a day for 30 days. 90 Cap 1   • amLODIPine (NORVASC) 10 MG Tab Take 10 mg by  mouth 1 time daily as needed.     • lidocaine (LIDODERM) 5 % Patch Apply 1 Patch to skin as directed every 12 hours. On for 12 hours off for 12 hours     • docusate sodium (COLACE) 100 MG Cap Take 100 mg by mouth every day.     • tizanidine (ZANAFLEX) 4 MG Tab Take 8 mg by mouth every bedtime.     • promethazine (PHENERGAN) 25 MG Tab Take 1 Tab by mouth every 8 hours as needed for Nausea/Vomiting. 60 Tab 3   • traZODone (DESYREL) 50 MG Tab Take 1 Tab by mouth every bedtime. 90 Tab 3   • omeprazole (PRILOSEC) 20 MG delayed-release capsule Take 1 Cap by mouth every day. 30 Cap 3   • sevelamer carbonate (RENVELA) 800 MG Tab tablet Take 1,600-3,200 mg by mouth See Admin Instructions. 1,600 mg with snacks   3,200 mg with meals     • ferrous sulfate 325 (65 FE) MG tablet Take 325 mg by mouth every day.     • ascorbic acid (ASCORBIC ACID) 500 MG Tab Take 500 mg by mouth every day.     • hydroxychloroquine (PLAQUENIL) 200 MG Tab Take 200 mg by mouth every bedtime.     • cholecalciferol (VITAMIN D3) 5000 UNIT Cap Take 10,000 Units by mouth every day.         Allergies:   Allergies   Allergen Reactions   • Lorazepam [Ativan] Anxiety     Patient becomes severely paranoid and agitated   • Morphine Itching     Tolerates Dilaudid   • Seasonal Runny Nose and Itching     Hay fever, sabiha brush       Social Hx:   Social History     Social History   • Marital status: Single     Spouse name: N/A   • Number of children: N/A   • Years of education: N/A     Occupational History   • Not on file.     Social History Main Topics   • Smoking status: Former Smoker     Packs/day: 0.50     Years: 18.00     Types: Cigarettes     Quit date: 6/13/2011   • Smokeless tobacco: Never Used      Comment: 1/2 ppd   • Alcohol use No   • Drug use: No   • Sexual activity: Not on file     Other Topics Concern   •  Service No   • Blood Transfusions Yes   • Caffeine Concern No   • Occupational Exposure No   • Hobby Hazards No   • Sleep Concern Yes   •  "Stress Concern Yes   • Weight Concern Yes   • Special Diet Yes   • Back Care No   • Exercise Yes   • Bike Helmet No   • Seat Belt Yes   • Self-Exams Yes     Social History Narrative   • No narrative on file       EXAMINATION     Physical Exam:   Vitals: /98 (BP Location: Left arm, Patient Position: Sitting, BP Cuff Size: Adult long)   Pulse 75   Temp 36.3 °C (97.3 °F) (Temporal)   Ht 1.651 m (5' 5\")   Wt 81.1 kg (178 lb 12.7 oz)   SpO2 98%     Constitutional:   Body Habitus: Body mass index is 29.75 kg/m².  Cooperation: Fully cooperates with exam  Appearance: Well-groomed no disheveled, in no acute distress  Respiratory-  breathing comfortable on room air, no wheezing.  Psychiatric- alert and oriented ×3. Normal affect.   Spine:   No focal motor deficits in the lower extremities bilaterally.  Sensation is grossly intact to light touch in the lower extremities bilaterally. She does not report significant paresthesias today.  Reflexes 2+ patella and 1+ achilles bilaterally  Hip ROM is painful with Doroteo's and anterior groin pain.  Negative Doroteo's on the left.  Gait slow, steady without loss of balance.  No use of assist device.  No walking shoe on the left      MEDICAL DECISION MAKING    DATA    Labs:     UDS:   06/13/2019 Positive negative for oxycodone, positive for other oxycodone metabolites  04/16/2019 Positive for oxymorphone  01/17/2019 Oxycodone and metabolites as expected  10/02/2018 Oxycodone and metabolites, also fentanyl    01/09/2019: Hep B surface ag negative  01/08/2019: GFR 7; BUN 23 Cr 6.68, Plt 160    12/15/2018 CRP 3.03    BMP 12/16/2018  Na 136, Potassium 4.4, chloride 96, CO2 23 Glucose 83, BUN 55H, Cr 9.44H, Calcium 9.9, Anion gap 17.0H    CBC 3.3, Hb 9.6, Hct 31.3, Plt 115    Lab Results   Component Value Date/Time    HBA1C 4.9 06/07/2018 02:29 AM        Imaging:   I reviewed the following radiology reports reviewed  Xray left foot 01/21/2019  Nondisplaced transverse fracture through " the base of the fifth metatarsal.    MRI lumbar spine 1/1/19  1.  Diffusely decreased signal again seen throughout the marrow space consistent with red marrow conversion, likely secondary to chronic anemia.  2.  No significant disc bulging, central canal narrowing, or foraminal narrowing.  3.  No lumbar spine fracture or subluxation.    MRI brain 12/13/2018  1.  There is no hippocampal sclerosis.  2.  There is no evidence of cortical dysplasia or neuronal migrational abnormalities.  3.  A few nonspecific T2 hyperintensities in the supratentorial white matter likely representing nonspecific foci of gliosis, chronic ischemia and demyelination. This is unchanged since the previous MRI dated 2/23/2012.       Chest xray 06/07/2018: Minimal right-sided opacities as described above, suggesting pneumonia.               Results for orders placed during the hospital encounter of 07/19/17   MR-LUMBAR SPINE-W/O    Impression 1.  Diffuse decreased bone marrow signal intensity throughout the lumbar spine and sacrum, presumably secondary to chronic anemia.    2.  Otherwise unremarkable MRI scan of the lumbar spine without contrast.                                    DIAGNOSIS   Visit Diagnoses     ICD-10-CM   1. ESRD (end stage renal disease) on dialysis ([facility]) N18.6    Z99.2   2. Fibromyalgia M79.7   3. Avascular necrosis of bones of both hips ([facility]) M87.051    M87.052   4. Uncomplicated opioid dependence ([facility]) F11.20         ASSESSMENT and PLAN:     Debby Carrizales 38 y.o. Female returns for follow-up of her chronic pain related to lupus, AVN hips, low back and peripheral neuropathy    Debby was seen today for follow-up.    Diagnoses and all orders for this visit:    ESRD (end stage renal disease) on dialysis ([facility])    Fibromyalgia  -     Oxycodone HCl 20 MG Tab; 1 Tab by PO/Feeding Tube route every 6 hours for 28 days. Take 1 every 6 hours  -     Consent for Opiate Prescription  -     pregabalin (LYRICA) 75 MG Cap; Take 1  "Cap by mouth 3 times a day for 30 days.    Avascular necrosis of bones of both hips (HCC)  -     Oxycodone HCl 20 MG Tab; 1 Tab by PO/Feeding Tube route every 6 hours for 28 days. Take 1 every 6 hours  -     Consent for Opiate Prescription  -     Oxycodone HCl 20 MG Tab; Take 1 Tab by mouth every 6 hours as needed for up to 28 days.    Uncomplicated opioid dependence (HCC)  -     Oxycodone HCl 20 MG Tab; Take 1 Tab by mouth every 6 hours as needed for up to 28 days.       1. Discussed plan to continue oxycodone up to four times a day.  Plan to decrease to 3/day, as she is able.  Reviewed  and recent UDS results.  2. Plan for her to follow-up with Dr. Holman as soon as she is able.  3. Continue lyrica 75mg po tid.  Refill given today.      In prescribing controlled substances to this patient, I certify that I have obtained and reviewed the medical history of Debby Carrizales. I have also made a good ly effort to obtain applicable records from other providers who have treated the patient and records demonstrating the following: report of \"drug-seeking behavior,\" although I suspect that she has organic reasons for pain and will continue to monitor as appropriate.     I have conducted a physical exam and documented it. I have reviewed Ms. Carrizales’s prescription history as maintained by the [place] Prescription Monitoring Program.     I have assessed the patient’s risk for abuse, dependency, and addiction using the validated Opioid Risk Tool available at https://www.mdcalc.com/pirhnb-uide-bubc-ort-narcotic-abuse.     Given the above, I believe the benefits of controlled substance therapy outweigh the risks. The reasons for prescribing controlled substances include non-narcotic, oral analgesic alternatives have been inadequate for pain control and in my professional opinion, controlled substances are the only reasonable choice for this patient because she has limitations to medication choices due to being on " dialysis.   Accordingly, I have discussed the risk and benefits, treatment plan, and alternative therapies with the patient.       Follow up: 7 weeks       Please note that this dictation was created using voice recognition software. I have made every reasonable attempt to correct obvious errors but there may be errors of grammar and content that I may have overlooked prior to finalization of this note.      Quinn Chandler MD  Interventional Spine and Sports Physiatry  Physical Medicine and Rehabilitation  [facility]

## 2019-07-28 ENCOUNTER — APPOINTMENT (OUTPATIENT)
Dept: RADIOLOGY | Facility: MEDICAL CENTER | Age: 39
End: 2019-07-28
Attending: EMERGENCY MEDICINE
Payer: MEDICARE

## 2019-07-28 ENCOUNTER — HOSPITAL ENCOUNTER (EMERGENCY)
Facility: MEDICAL CENTER | Age: 39
End: 2019-07-28
Attending: EMERGENCY MEDICINE
Payer: MEDICARE

## 2019-07-28 VITALS
WEIGHT: 182.98 LBS | TEMPERATURE: 98.6 F | HEIGHT: 65 IN | RESPIRATION RATE: 18 BRPM | HEART RATE: 75 BPM | OXYGEN SATURATION: 98 % | BODY MASS INDEX: 30.49 KG/M2 | SYSTOLIC BLOOD PRESSURE: 170 MMHG | DIASTOLIC BLOOD PRESSURE: 71 MMHG

## 2019-07-28 DIAGNOSIS — M25.552 LEFT HIP PAIN: ICD-10-CM

## 2019-07-28 DIAGNOSIS — M25.552 CHRONIC LEFT HIP PAIN: ICD-10-CM

## 2019-07-28 DIAGNOSIS — G89.29 CHRONIC LEFT HIP PAIN: ICD-10-CM

## 2019-07-28 LAB
APPEARANCE UR: CLEAR
BACTERIA #/AREA URNS HPF: ABNORMAL /HPF
BILIRUB UR QL STRIP.AUTO: NEGATIVE
COLOR UR: YELLOW
EPI CELLS #/AREA URNS HPF: ABNORMAL /HPF
GLUCOSE UR STRIP.AUTO-MCNC: 250 MG/DL
KETONES UR STRIP.AUTO-MCNC: NEGATIVE MG/DL
LEUKOCYTE ESTERASE UR QL STRIP.AUTO: ABNORMAL
MICRO URNS: ABNORMAL
NITRITE UR QL STRIP.AUTO: NEGATIVE
PH UR STRIP.AUTO: 8.5 [PH]
PROT UR QL STRIP: 100 MG/DL
RBC # URNS HPF: ABNORMAL /HPF
RBC UR QL AUTO: ABNORMAL
SP GR UR STRIP.AUTO: 1.01

## 2019-07-28 PROCEDURE — 99284 EMERGENCY DEPT VISIT MOD MDM: CPT

## 2019-07-28 PROCEDURE — 81001 URINALYSIS AUTO W/SCOPE: CPT

## 2019-07-28 PROCEDURE — 700111 HCHG RX REV CODE 636 W/ 250 OVERRIDE (IP): Performed by: EMERGENCY MEDICINE

## 2019-07-28 PROCEDURE — 96372 THER/PROPH/DIAG INJ SC/IM: CPT

## 2019-07-28 PROCEDURE — 87086 URINE CULTURE/COLONY COUNT: CPT

## 2019-07-28 PROCEDURE — 73501 X-RAY EXAM HIP UNI 1 VIEW: CPT | Mod: LT

## 2019-07-28 RX ORDER — HYDROMORPHONE HYDROCHLORIDE 1 MG/ML
1 INJECTION, SOLUTION INTRAMUSCULAR; INTRAVENOUS; SUBCUTANEOUS ONCE
Status: COMPLETED | OUTPATIENT
Start: 2019-07-28 | End: 2019-07-28

## 2019-07-28 RX ORDER — ONDANSETRON 4 MG/1
4 TABLET, ORALLY DISINTEGRATING ORAL ONCE
Status: COMPLETED | OUTPATIENT
Start: 2019-07-28 | End: 2019-07-28

## 2019-07-28 RX ORDER — OXYCODONE HYDROCHLORIDE 20 MG/1
20 TABLET ORAL 4 TIMES DAILY
Status: SHIPPED | COMMUNITY
End: 2019-08-22 | Stop reason: SDUPTHER

## 2019-07-28 RX ADMIN — ONDANSETRON 4 MG: 4 TABLET, ORALLY DISINTEGRATING ORAL at 14:41

## 2019-07-28 RX ADMIN — HYDROMORPHONE HYDROCHLORIDE 1 MG: 1 INJECTION, SOLUTION INTRAMUSCULAR; INTRAVENOUS; SUBCUTANEOUS at 17:11

## 2019-07-28 RX ADMIN — HYDROMORPHONE HYDROCHLORIDE 1 MG: 1 INJECTION, SOLUTION INTRAMUSCULAR; INTRAVENOUS; SUBCUTANEOUS at 15:55

## 2019-07-28 RX ADMIN — HYDROMORPHONE HYDROCHLORIDE 1 MG: 1 INJECTION, SOLUTION INTRAMUSCULAR; INTRAVENOUS; SUBCUTANEOUS at 14:40

## 2019-07-28 ASSESSMENT — PAIN DESCRIPTION - DESCRIPTORS: DESCRIPTORS: ACHING

## 2019-07-28 NOTE — ED NOTES
"Presents with a hx of avascular necrosis of both hips.  She reports a right hip replacement in 2016. C/O recurring pain affecting her left hip as well. She also reports worsening dysuria for the past 2 days.      Chief Complaint   Patient presents with   • Hip Pain     BP (!) 180/94   Pulse 88   Temp 36.9 °C (98.4 °F) (Temporal)   Resp 18   Ht 1.651 m (5' 5\")   Wt 83 kg (182 lb 15.7 oz)   SpO2 96%   BMI 30.45 kg/m²     " 81d

## 2019-07-28 NOTE — ED PROVIDER NOTES
ED Provider Note      ER PROVIDER NOTE      CHIEF COMPLAINT  Chief Complaint   Patient presents with   • Hip Pain       HPI  Debby Carrizales is a 38 y.o. female who presents to the emergency department complaining of left hip pain.  Patient has long-standing history of chronic hip pain due to avascular necrosis.  She has had hip replacement on the right, but has been unable to get hip replacement on the left due to financial reasons.  Patient always has pain to that hip but states it seems worse over the last week or so.  She denies any direct trauma to the area.  She denies any knee pain or other leg pain or swelling.  No focal weakness numbness or tingling.  She does report some bilateral low back pain as well.  Patient is also on dialysis Monday Wednesday Friday, although she still does make some urine, states that she has had some pain with urination recently as well.  No fevers or chills.  No abdominal pain, she has some chronic nausea although no.  No diarrhea.  No other complaints    REVIEW OF SYSTEMS  Pertinent positives include hip pain. Pertinent negatives include no fever. See HPI for details. All other systems reviewed and are negative.    PAST MEDICAL HISTORY   has a past medical history of Arthritis; Avascular necrosis of bones of both hips ([facility]) (10/10/2016); Clostridium difficile colitis (5/3/2011); Dialysis patient; ESBL (extended spectrum beta-lactamase) producing bacteria infection (8/25/2014); Fibromyalgia; Hypertension; Lupus ([facility]); Pneumonia (); Psychiatric disorder; Pyelonephritis (11/21/2017); Renal failure; Sepsis due to pneumonia ([facility]) (6/7/2018); and Status epilepticus ([facility]) (12/13/2018).    SURGICAL HISTORY   has a past surgical history that includes gastroscopy-endo (4/10/2011); sclerotheraphy (4/10/2011); gastroscopy-endo (4/18/2011); colonoscopy with biopsy (4/20/2011); gastroscopy-endo (4/27/2011); other (5/2011); other abdominal surgery; av fistula creation  (9/9/2014); cath placement (9/9/2014); av fistula creation (11/14/2014); av fistula creation (2/3/2015); hip arthroplasty total (Right, 1/18/2016); closed reduction (Right, 7/5/2016); av fistula creation (Right, 7/12/2016); thrombectomy (Right, 8/20/2016); thrombectomy (Right, 8/21/2016); angioplasty balloon (8/21/2016); tracheostomy; av fistula creation (Right, 12/9/2017); thrombectomy (12/9/2017); av fistula revision (Right, 8/11/2018); and av fistula thrombolysis (Right, 8/11/2018).    FAMILY HISTORY  Family History   Problem Relation Age of Onset   • Heart Disease Mother    • Cancer Father        SOCIAL HISTORY  Social History     Social History   • Marital status: Single     Spouse name: N/A   • Number of children: N/A   • Years of education: N/A     Social History Main Topics   • Smoking status: Former Smoker     Packs/day: 0.50     Years: 18.00     Types: Cigarettes     Quit date: 6/13/2011   • Smokeless tobacco: Never Used      Comment: 1/2 ppd   • Alcohol use No   • Drug use: No   • Sexual activity: Not on file     Other Topics Concern   •  Service No   • Blood Transfusions Yes   • Caffeine Concern No   • Occupational Exposure No   • Hobby Hazards No   • Sleep Concern Yes   • Stress Concern Yes   • Weight Concern Yes   • Special Diet Yes   • Back Care No   • Exercise Yes   • Bike Helmet No   • Seat Belt Yes   • Self-Exams Yes     Social History Narrative   • No narrative on file      History   Drug Use No       CURRENT MEDICATIONS  Home Medications     Reviewed by Kasia Moore (Pharmacy Tech) on 07/28/19 at 1449  Med List Status: Complete   Medication Last Dose Status   amLODIPine (NORVASC) 10 MG Tab 7/27/2019 Active   ascorbic acid (ASCORBIC ACID) 500 MG Tab 7/28/2019 Active   Cholecalciferol (VITAMIN D3) 25526 UNIT Cap 7/28/2019 Active   docusate sodium (COLACE) 100 MG Cap 7/27/2019 Active   ferrous sulfate 325 (65 FE) MG tablet 7/28/2019 Active   hydroxychloroquine (PLAQUENIL) 200 MG  "Tab 7/27/2019 Active   lacosamide (VIMPAT) 100 MG Tab tablet 7/28/2019 Active   lidocaine (LIDODERM) 5 % Patch > 4 days Active   omeprazole (PRILOSEC) 20 MG delayed-release capsule 7/27/2019 Active   Oxycodone HCl 20 MG Tab 7/28/2019 Active   pregabalin (LYRICA) 75 MG Cap 7/28/2019 Active   promethazine (PHENERGAN) 25 MG Tab 7/28/2019 Active   sevelamer carbonate (RENVELA) 800 MG Tab tablet 7/27/2019 Active   tizanidine (ZANAFLEX) 4 MG Tab 7/27/2019 Active   traZODone (DESYREL) 50 MG Tab 7/27/2019 Active                ALLERGIES  Allergies   Allergen Reactions   • Lorazepam [Ativan] Anxiety     Patient becomes severely paranoid and agitated   • Morphine Itching     Tolerates Dilaudid   • Seasonal Runny Nose and Itching     Hay fever, sabiha brush       PHYSICAL EXAM  VITAL SIGNS: BP (!) 180/94   Pulse 74   Temp 36.9 °C (98.4 °F) (Temporal)   Resp 18   Ht 1.651 m (5' 5\")   Wt 83 kg (182 lb 15.7 oz)   SpO2 96%   BMI 30.45 kg/m²   Pulse ox interpretation: I interpret this pulse ox as normal.    Constitutional: Alert mildly uncomfortable  HENT: No signs of trauma, Bilateral external ears normal, Nose normal.   Eyes: Pupils are equal and reactive, Conjunctiva normal, Non-icteric.   Neck: Normal range of motion, No tenderness, Supple, No stridor.   Lymphatic: No lymphadenopathy noted.   Cardiovascular: Regular rate and rhythm, no murmurs.   Thorax & Lungs: Normal breath sounds, No respiratory distress, No wheezing, No chest tenderness.   Abdomen: Bowel sounds normal, Soft, No tenderness, No masses, No pulsatile masses. No peritoneal signs.  Skin: Warm, Dry, No erythema, No rash.   Back: No bony tenderness, left-sided paraspinous tenderness with some slight spasm, no CVA tenderness.   Extremities: Tenderness over left hip laterally although no obvious deformity, fistula to right arm intact distal pulses, No edema, No tenderness, No cyanosis, Negative Samy's sign.  Musculoskeletal: Other than hip as above, good range " of motion in all major joints. No tenderness to palpation or major deformities noted.   Neurologic: Alert , Normal motor function, Normal sensory function, No focal deficits noted.   Psychiatric: Affect normal, Judgment normal, Mood normal.     DIAGNOSTIC STUDIES / PROCEDURES      LABS  Labs Reviewed   URINALYSIS - Abnormal; Notable for the following:        Result Value    Ph 8.5 (*)     Glucose 250 (*)     Protein 100 (*)     Leukocyte Esterase Small (*)     Occult Blood Trace (*)     All other components within normal limits    Narrative:     Indication for culture:->Unexplained new onset of Flank pain  and/or Costovertebral angle tenderness   URINE MICROSCOPIC (W/UA) - Abnormal; Notable for the following:     RBC 2-5 (*)     Bacteria Few (*)     All other components within normal limits    Narrative:     Indication for culture:->Unexplained new onset of Flank pain  and/or Costovertebral angle tenderness   URINE CULTURE(NEW)       All labs reviewed by me.    RADIOLOGY  DX-HIP-UNILATERAL-WITH PELVIS-1 VIEW LEFT   Final Result      Left femoral head heterogeneous density is compatible with known diagnosis of avascular necrosis. There is no joint space narrowing or spurring      Right arthroplasty without evident complication.        The radiologist's interpretation of all radiological studies have been reviewed by me.    COURSE & MEDICAL DECISION MAKING  Nursing notes, VS, PMSFHx reviewed in chart.    2:27 PM Patient seen and examined at bedside. Patient will be treated with Zofran ODT, IM hydromorphone. Ordered for urinalysis, x-ray to evaluate her symptoms.     3:44 PM  Patient reevaluated, her pain is returning, will order additional pain medication    4:55 PM  Patient reevaluated, updated on results, pain is returning, patient states she would like to go home but would like 1 more dose of pain medication for home          Decision Making:  This is a 38 y.o. female presented with chronic left hip pain.  At this  point does not appear to have acute or emergent process, however does have certainly underlying pathology to that hip with her avascular necrosis.  She has been given pain medication here with some relief in her symptoms, she is continuing to work with pain management as well as orthopedics for long-term solution.  At this point does not appear to have any findings suggestive of significant urinary tract infection or other emergent process    I discussed strict return precautions with the patient which she understands well and agrees to, and she is stable at the time of discharge    The patient is referred to a primary physician for blood pressure management, diabetic screening, and for all other preventative health concerns.      DISPOSITION:  Patient will be discharged home in stable condition.    FOLLOW UP:  Sushila Manzano M.D.  [address] [place] [id number] [phone]    In 2 days        OUTPATIENT MEDICATIONS:  New Prescriptions    No medications on file         FINAL IMPRESSION  1. Left hip pain    2. Chronic left hip pain         The note accurately reflects work and decisions made by me.  Neftaly Pérez  7/28/2019  4:56 PM

## 2019-07-28 NOTE — ED NOTES
Med rec updated and complete  Allergies reviewed  Interviewed pt with grandmother at bedside with permission from pt.  Pt reports no antibiotics in the last 2 weeks  Pt reports that she took her OXYCODONE 20MG, PROMETHAZINE 25MG, and LYRICA today (7/28/2019) @ 1000 and threw them back up.

## 2019-07-29 RX ORDER — PROMETHAZINE HYDROCHLORIDE 25 MG/1
TABLET ORAL
Qty: 60 TAB | Refills: 0 | Status: SHIPPED | OUTPATIENT
Start: 2019-07-29 | End: 2019-08-28 | Stop reason: SDUPTHER

## 2019-07-29 NOTE — ED NOTES
Patient verbalized understanding of plan of care and discharge information. Patient in stable condition. No signs of distress. Patient pain free. Patient ambulatory out of ED to personal vehicle with stable gait with family.   Grandmother to drive home.

## 2019-07-30 LAB
BACTERIA UR CULT: NORMAL
SIGNIFICANT IND 70042: NORMAL
SITE SITE: NORMAL
SOURCE SOURCE: NORMAL

## 2019-08-12 ENCOUNTER — HOSPITAL ENCOUNTER (INPATIENT)
Facility: MEDICAL CENTER | Age: 39
LOS: 9 days | DRG: 189 | End: 2019-08-21
Attending: EMERGENCY MEDICINE | Admitting: INTERNAL MEDICINE
Payer: MEDICARE

## 2019-08-12 ENCOUNTER — APPOINTMENT (OUTPATIENT)
Dept: RADIOLOGY | Facility: MEDICAL CENTER | Age: 39
DRG: 189 | End: 2019-08-12
Attending: EMERGENCY MEDICINE
Payer: MEDICARE

## 2019-08-12 DIAGNOSIS — E87.29 HIGH ANION GAP METABOLIC ACIDOSIS: ICD-10-CM

## 2019-08-12 DIAGNOSIS — N19 UREMIA: ICD-10-CM

## 2019-08-12 DIAGNOSIS — R53.1 GENERALIZED WEAKNESS: ICD-10-CM

## 2019-08-12 PROBLEM — R79.89 AZOTEMIA: Status: ACTIVE | Noted: 2019-08-12

## 2019-08-12 LAB
ALBUMIN SERPL BCP-MCNC: 3.7 G/DL (ref 3.2–4.9)
ALBUMIN/GLOB SERPL: 0.9 G/DL
ALP SERPL-CCNC: 79 U/L (ref 30–99)
ALT SERPL-CCNC: 11 U/L (ref 2–50)
AMMONIA PLAS-SCNC: 27 UMOL/L (ref 11–45)
ANION GAP SERPL CALC-SCNC: 29 MMOL/L (ref 0–11.9)
APPEARANCE UR: CLEAR
AST SERPL-CCNC: 12 U/L (ref 12–45)
BACTERIA #/AREA URNS HPF: ABNORMAL /HPF
BASOPHILS # BLD AUTO: 0.5 % (ref 0–1.8)
BASOPHILS # BLD: 0.03 K/UL (ref 0–0.12)
BILIRUB SERPL-MCNC: 1.7 MG/DL (ref 0.1–1.5)
BILIRUB UR QL STRIP.AUTO: NEGATIVE
BUN SERPL-MCNC: 127 MG/DL (ref 8–22)
CALCIUM SERPL-MCNC: 8.9 MG/DL (ref 8.4–10.2)
CHLORIDE SERPL-SCNC: 100 MMOL/L (ref 96–112)
CO2 SERPL-SCNC: 12 MMOL/L (ref 20–33)
COLOR UR: YELLOW
CREAT SERPL-MCNC: 25.62 MG/DL (ref 0.5–1.4)
EOSINOPHIL # BLD AUTO: 0.11 K/UL (ref 0–0.51)
EOSINOPHIL NFR BLD: 1.9 % (ref 0–6.9)
EPI CELLS #/AREA URNS HPF: ABNORMAL /HPF
ERYTHROCYTE [DISTWIDTH] IN BLOOD BY AUTOMATED COUNT: 43.1 FL (ref 35.9–50)
ETHANOL BLD-MCNC: 0 G/DL
GLOBULIN SER CALC-MCNC: 4.1 G/DL (ref 1.9–3.5)
GLUCOSE BLD-MCNC: 85 MG/DL (ref 65–99)
GLUCOSE SERPL-MCNC: 76 MG/DL (ref 65–99)
GLUCOSE UR STRIP.AUTO-MCNC: 100 MG/DL
HBV SURFACE AG SER QL: NEGATIVE
HCG SERPL QL: NEGATIVE
HCT VFR BLD AUTO: 31 % (ref 37–47)
HGB BLD-MCNC: 10 G/DL (ref 12–16)
IMM GRANULOCYTES # BLD AUTO: 0.06 K/UL (ref 0–0.11)
IMM GRANULOCYTES NFR BLD AUTO: 1 % (ref 0–0.9)
KETONES UR STRIP.AUTO-MCNC: 15 MG/DL
LACTATE BLD-SCNC: 1 MMOL/L (ref 0.5–2)
LEUKOCYTE ESTERASE UR QL STRIP.AUTO: NEGATIVE
LYMPHOCYTES # BLD AUTO: 0.94 K/UL (ref 1–4.8)
LYMPHOCYTES NFR BLD: 15.9 % (ref 22–41)
MAGNESIUM SERPL-MCNC: 4.1 MG/DL (ref 1.5–2.5)
MCH RBC QN AUTO: 30.9 PG (ref 27–33)
MCHC RBC AUTO-ENTMCNC: 32.3 G/DL (ref 33.6–35)
MCV RBC AUTO: 95.7 FL (ref 81.4–97.8)
MICRO URNS: ABNORMAL
MONOCYTES # BLD AUTO: 0.55 K/UL (ref 0–0.85)
MONOCYTES NFR BLD AUTO: 9.3 % (ref 0–13.4)
MUCOUS THREADS #/AREA URNS HPF: ABNORMAL /HPF
NEUTROPHILS # BLD AUTO: 4.21 K/UL (ref 2–7.15)
NEUTROPHILS NFR BLD: 71.4 % (ref 44–72)
NITRITE UR QL STRIP.AUTO: NEGATIVE
NRBC # BLD AUTO: 0.02 K/UL
NRBC BLD-RTO: 0.3 /100 WBC
NT-PROBNP SERPL IA-MCNC: ABNORMAL PG/ML (ref 0–125)
PH UR STRIP.AUTO: 7.5 [PH] (ref 5–8)
PLATELET # BLD AUTO: 117 K/UL (ref 164–446)
PMV BLD AUTO: 10.8 FL (ref 9–12.9)
POTASSIUM SERPL-SCNC: 3 MMOL/L (ref 3.6–5.5)
POTASSIUM SERPL-SCNC: 5.8 MMOL/L (ref 3.6–5.5)
PROT SERPL-MCNC: 7.8 G/DL (ref 6–8.2)
PROT UR QL STRIP: >=300 MG/DL
RBC # BLD AUTO: 3.24 M/UL (ref 4.2–5.4)
RBC # URNS HPF: ABNORMAL /HPF
RBC UR QL AUTO: ABNORMAL
SALICYLATES SERPL-MCNC: <4 MG/DL (ref 15–25)
SODIUM SERPL-SCNC: 141 MMOL/L (ref 135–145)
SP GR UR STRIP.AUTO: 1.02
TROPONIN T SERPL-MCNC: 40 NG/L (ref 6–19)
WBC # BLD AUTO: 5.9 K/UL (ref 4.8–10.8)
WBC #/AREA URNS HPF: ABNORMAL /HPF

## 2019-08-12 PROCEDURE — 700111 HCHG RX REV CODE 636 W/ 250 OVERRIDE (IP)

## 2019-08-12 PROCEDURE — 770022 HCHG ROOM/CARE - ICU (200)

## 2019-08-12 PROCEDURE — 700111 HCHG RX REV CODE 636 W/ 250 OVERRIDE (IP): Performed by: EMERGENCY MEDICINE

## 2019-08-12 PROCEDURE — 84484 ASSAY OF TROPONIN QUANT: CPT

## 2019-08-12 PROCEDURE — 80053 COMPREHEN METABOLIC PANEL: CPT

## 2019-08-12 PROCEDURE — 83735 ASSAY OF MAGNESIUM: CPT

## 2019-08-12 PROCEDURE — 82140 ASSAY OF AMMONIA: CPT

## 2019-08-12 PROCEDURE — 94640 AIRWAY INHALATION TREATMENT: CPT

## 2019-08-12 PROCEDURE — 82962 GLUCOSE BLOOD TEST: CPT

## 2019-08-12 PROCEDURE — 71045 X-RAY EXAM CHEST 1 VIEW: CPT

## 2019-08-12 PROCEDURE — 87340 HEPATITIS B SURFACE AG IA: CPT

## 2019-08-12 PROCEDURE — 96374 THER/PROPH/DIAG INJ IV PUSH: CPT

## 2019-08-12 PROCEDURE — 90935 HEMODIALYSIS ONE EVALUATION: CPT

## 2019-08-12 PROCEDURE — 700101 HCHG RX REV CODE 250: Performed by: EMERGENCY MEDICINE

## 2019-08-12 PROCEDURE — 85025 COMPLETE CBC W/AUTO DIFF WBC: CPT

## 2019-08-12 PROCEDURE — 99223 1ST HOSP IP/OBS HIGH 75: CPT | Performed by: INTERNAL MEDICINE

## 2019-08-12 PROCEDURE — 700101 HCHG RX REV CODE 250

## 2019-08-12 PROCEDURE — 83880 ASSAY OF NATRIURETIC PEPTIDE: CPT

## 2019-08-12 PROCEDURE — 99291 CRITICAL CARE FIRST HOUR: CPT

## 2019-08-12 PROCEDURE — 84703 CHORIONIC GONADOTROPIN ASSAY: CPT

## 2019-08-12 PROCEDURE — 87086 URINE CULTURE/COLONY COUNT: CPT

## 2019-08-12 PROCEDURE — 94002 VENT MGMT INPAT INIT DAY: CPT

## 2019-08-12 PROCEDURE — 99291 CRITICAL CARE FIRST HOUR: CPT | Performed by: INTERNAL MEDICINE

## 2019-08-12 PROCEDURE — 700105 HCHG RX REV CODE 258: Performed by: INTERNAL MEDICINE

## 2019-08-12 PROCEDURE — 83605 ASSAY OF LACTIC ACID: CPT

## 2019-08-12 PROCEDURE — 700101 HCHG RX REV CODE 250: Performed by: INTERNAL MEDICINE

## 2019-08-12 PROCEDURE — 70450 CT HEAD/BRAIN W/O DYE: CPT

## 2019-08-12 PROCEDURE — 80307 DRUG TEST PRSMV CHEM ANLYZR: CPT

## 2019-08-12 PROCEDURE — 84132 ASSAY OF SERUM POTASSIUM: CPT

## 2019-08-12 PROCEDURE — 700111 HCHG RX REV CODE 636 W/ 250 OVERRIDE (IP): Performed by: INTERNAL MEDICINE

## 2019-08-12 PROCEDURE — 96376 TX/PRO/DX INJ SAME DRUG ADON: CPT

## 2019-08-12 PROCEDURE — 96375 TX/PRO/DX INJ NEW DRUG ADDON: CPT

## 2019-08-12 PROCEDURE — 81001 URINALYSIS AUTO W/SCOPE: CPT

## 2019-08-12 PROCEDURE — 93005 ELECTROCARDIOGRAM TRACING: CPT | Performed by: EMERGENCY MEDICINE

## 2019-08-12 PROCEDURE — 87040 BLOOD CULTURE FOR BACTERIA: CPT

## 2019-08-12 RX ORDER — DIPHENHYDRAMINE HYDROCHLORIDE 50 MG/ML
12.5 INJECTION INTRAMUSCULAR; INTRAVENOUS ONCE
Status: COMPLETED | OUTPATIENT
Start: 2019-08-12 | End: 2019-08-12

## 2019-08-12 RX ORDER — ALBUMIN (HUMAN) 12.5 G/50ML
12.5 SOLUTION INTRAVENOUS
Status: DISCONTINUED | OUTPATIENT
Start: 2019-08-12 | End: 2019-08-17

## 2019-08-12 RX ORDER — POLYETHYLENE GLYCOL 3350 17 G/17G
1 POWDER, FOR SOLUTION ORAL
Status: DISCONTINUED | OUTPATIENT
Start: 2019-08-12 | End: 2019-08-21 | Stop reason: HOSPADM

## 2019-08-12 RX ORDER — DEXMEDETOMIDINE HYDROCHLORIDE 100 UG/ML
INJECTION, SOLUTION INTRAVENOUS
Status: COMPLETED
Start: 2019-08-12 | End: 2019-08-12

## 2019-08-12 RX ORDER — HEPARIN SODIUM 5000 [USP'U]/ML
5000 INJECTION, SOLUTION INTRAVENOUS; SUBCUTANEOUS EVERY 8 HOURS
Status: DISCONTINUED | OUTPATIENT
Start: 2019-08-12 | End: 2019-08-15

## 2019-08-12 RX ORDER — AMOXICILLIN 250 MG
2 CAPSULE ORAL 2 TIMES DAILY
Status: DISCONTINUED | OUTPATIENT
Start: 2019-08-12 | End: 2019-08-21 | Stop reason: HOSPADM

## 2019-08-12 RX ORDER — LEVETIRACETAM 500 MG/5ML
INJECTION, SOLUTION, CONCENTRATE INTRAVENOUS
Status: COMPLETED
Start: 2019-08-12 | End: 2019-08-12

## 2019-08-12 RX ORDER — SODIUM CHLORIDE 9 MG/ML
250 INJECTION, SOLUTION INTRAVENOUS
Status: DISCONTINUED | OUTPATIENT
Start: 2019-08-12 | End: 2019-08-17

## 2019-08-12 RX ORDER — ENALAPRILAT 1.25 MG/ML
1.25 INJECTION INTRAVENOUS EVERY 6 HOURS PRN
Status: DISCONTINUED | OUTPATIENT
Start: 2019-08-12 | End: 2019-08-21 | Stop reason: HOSPADM

## 2019-08-12 RX ORDER — LABETALOL HYDROCHLORIDE 5 MG/ML
10 INJECTION, SOLUTION INTRAVENOUS ONCE
Status: COMPLETED | OUTPATIENT
Start: 2019-08-12 | End: 2019-08-12

## 2019-08-12 RX ORDER — BISACODYL 10 MG
10 SUPPOSITORY, RECTAL RECTAL
Status: DISCONTINUED | OUTPATIENT
Start: 2019-08-12 | End: 2019-08-21 | Stop reason: HOSPADM

## 2019-08-12 RX ORDER — HYDROMORPHONE HYDROCHLORIDE 1 MG/ML
INJECTION, SOLUTION INTRAMUSCULAR; INTRAVENOUS; SUBCUTANEOUS
Status: COMPLETED
Start: 2019-08-12 | End: 2019-08-12

## 2019-08-12 RX ORDER — NICARDIPINE HYDROCHLORIDE 2.5 MG/ML
INJECTION INTRAVENOUS
Status: COMPLETED
Start: 2019-08-12 | End: 2019-08-12

## 2019-08-12 RX ORDER — HYDRALAZINE HYDROCHLORIDE 20 MG/ML
20 INJECTION INTRAMUSCULAR; INTRAVENOUS EVERY 6 HOURS PRN
Status: DISCONTINUED | OUTPATIENT
Start: 2019-08-12 | End: 2019-08-21 | Stop reason: HOSPADM

## 2019-08-12 RX ADMIN — DIPHENHYDRAMINE HYDROCHLORIDE 12.5 MG: 50 INJECTION INTRAMUSCULAR; INTRAVENOUS at 15:33

## 2019-08-12 RX ADMIN — LABETALOL HYDROCHLORIDE 10 MG: 5 INJECTION INTRAVENOUS at 15:33

## 2019-08-12 RX ADMIN — HYDROMORPHONE HYDROCHLORIDE 1 MG: 1 INJECTION, SOLUTION INTRAMUSCULAR; INTRAVENOUS; SUBCUTANEOUS at 17:51

## 2019-08-12 RX ADMIN — DIPHENHYDRAMINE HYDROCHLORIDE 12.5 MG: 50 INJECTION INTRAMUSCULAR; INTRAVENOUS at 16:40

## 2019-08-12 RX ADMIN — NICARDIPINE HYDROCHLORIDE 25 MG: 25 INJECTION INTRAVENOUS at 19:27

## 2019-08-12 RX ADMIN — DEXMEDETOMIDINE HYDROCHLORIDE 400 MCG: 100 INJECTION, SOLUTION INTRAVENOUS at 19:07

## 2019-08-12 RX ADMIN — SODIUM CHLORIDE 5 MG/HR: 9 INJECTION, SOLUTION INTRAVENOUS at 19:28

## 2019-08-12 RX ADMIN — LEVETIRACETAM 500 MG: 100 INJECTION INTRAVENOUS at 22:57

## 2019-08-12 RX ADMIN — LABETALOL HYDROCHLORIDE 10 MG: 5 INJECTION INTRAVENOUS at 16:45

## 2019-08-12 RX ADMIN — DEXMEDETOMIDINE HYDROCHLORIDE 0.2 MCG/KG/HR: 100 INJECTION, SOLUTION INTRAVENOUS at 19:30

## 2019-08-12 RX ADMIN — SODIUM CHLORIDE 500 MG: 9 INJECTION, SOLUTION INTRAVENOUS at 23:00

## 2019-08-12 RX ADMIN — Medication 50 MEQ: at 16:29

## 2019-08-12 RX ADMIN — SODIUM BICARBONATE 50 MEQ: 84 INJECTION, SOLUTION INTRAVENOUS at 16:29

## 2019-08-12 ASSESSMENT — LIFESTYLE VARIABLES: EVER_SMOKED: UNABLE TO EVALUATE AT THIS TIME - NEEDS ASSESSMENT PRIOR TO DISCHARGE

## 2019-08-12 ASSESSMENT — PULMONARY FUNCTION TESTS: EPAP_CMH2O: 7

## 2019-08-12 NOTE — ED NOTES
Kacie from Lab called with critical result of potassium 5.8 Bun 127 and Creatinine 25.6 . Critical lab result read back to Kacie.   Dr. Maradiaga notified of critical lab result at 1516.  Critical lab result read back by Dr. aMradiaga.

## 2019-08-12 NOTE — ED NOTES
Med rec complete per historical- pt was seen at [facility] on 7/28. Aquto was able to complete med rec by interviewing pt on 7/28/19. I have attempted to review medications with pt, however, pt states she is unable to provide this information.

## 2019-08-12 NOTE — ED TRIAGE NOTES
"Pt to ed , poor hx  C/o not feeling well  Missed dialysis x 2 this week.   \"twiching \" behavior , states hx of sz.  Not been taking her sz medication  "

## 2019-08-12 NOTE — DISCHARGE PLANNING
Outpatient Dialysis Note    Confirmed patient is active at:    [facility]  [address] [facility] [address]   [place] [address]      Schedule: Monday, Wednesday, Friday  Time: 3:45 pm    Spoke with Hafsa at facility who confirmed.    Dialysis Coordinator, [facility]  Kelli Anderson [phone]

## 2019-08-12 NOTE — ED PROVIDER NOTES
ED Provider Note  CHIEF COMPLAINT  Chief Complaint   Patient presents with   • Seizure     not been taking her medication       HPI  Debby Carrizales is a 38 y.o. female who presents with complaint of generalized weakness and twitching.  Patient is in end-stage renal failure patient on dialysis.  She is supposed to dialyze every Monday Wednesday and Friday.  She lost her kidneys to lupus.  She says she has not made it to dialysis for a week.  She missed dialysis on Friday and today.  She says her last dialysis was Wednesday.  Her grandmother says that she has not been able to get out of bed to eat or drink over the last 5 days.  Her grandmother says she is just been too weak.  She went over to the house today and could barely get her out of bed.  Patient has history of seizure disorder.  She is on Vimpat.  Grandmother says she is been acting more confused than normal.  Patient reports a fever she is afebrile here in the ER.  She denies chest pain.  No abdominal pain.  No headache.  No recent trauma.  Review of old records reveal that patient has a history of noncompliance.  She is a very poor historian.    REVIEW OF SYSTEMS  See HPI for further details.  Positive for generalized weakness, twitching, confusion, subjective fever.  Negative for chest pain, abdominal pain, nausea, vomiting, diarrhea.  Unable to obtain further review of systems due to patient clinical condition.    PAST MEDICAL HISTORY  Past Medical History:   Diagnosis Date   • Arthritis     all joints,r/t lupus   • Avascular necrosis of bones of both hips ([facility]) 10/10/2016   • Clostridium difficile colitis 5/3/2011   • Dialysis patient    • ESBL (extended spectrum beta-lactamase) producing bacteria infection 8/25/2014   • Fibromyalgia    • Hypertension    • Lupus ([facility])    • Pneumonia    • Psychiatric disorder     anxiety, depression   • Pyelonephritis 11/21/2017   • Renal failure    • Sepsis due to pneumonia ([facility]) 6/7/2018   • Status  epilepticus ([facility]) 2018       FAMILY HISTORY  Family History   Problem Relation Age of Onset   • Heart Disease Mother    • Cancer Father        SOCIAL HISTORY  Social History     Socioeconomic History   • Marital status: Single     Spouse name: Not on file   • Number of children: Not on file   • Years of education: Not on file   • Highest education level: Not on file   Occupational History   • Not on file   Social Needs   • Financial resource strain: Not on file   • Food insecurity:     Worry: Not on file     Inability: Not on file   • Transportation needs:     Medical: Not on file     Non-medical: Not on file   Tobacco Use   • Smoking status: Former Smoker     Packs/day: 0.50     Years: 18.00     Pack years: 9.00     Types: Cigarettes     Last attempt to quit: 2011     Years since quittin.1   • Smokeless tobacco: Never Used   • Tobacco comment:  ppd   Substance and Sexual Activity   • Alcohol use: No     Alcohol/week: 0.0 oz   • Drug use: No     Types: Marijuana   • Sexual activity: Not on file   Lifestyle   • Physical activity:     Days per week: Not on file     Minutes per session: Not on file   • Stress: Not on file   Relationships   • Social connections:     Talks on phone: Not on file     Gets together: Not on file     Attends Caodaism service: Not on file     Active member of club or organization: Not on file     Attends meetings of clubs or organizations: Not on file     Relationship status: Not on file   • Intimate partner violence:     Fear of current or ex partner: Not on file     Emotionally abused: Not on file     Physically abused: Not on file     Forced sexual activity: Not on file   Other Topics Concern   •  Service No   • Blood Transfusions Yes   • Caffeine Concern No   • Occupational Exposure No   • Hobby Hazards No   • Sleep Concern Yes   • Stress Concern Yes   • Weight Concern Yes   • Special Diet Yes   • Back Care No   • Exercise Yes   • Bike Helmet No   • Seat Belt Yes    • Self-Exams Yes   Social History Narrative   • Not on file       SURGICAL HISTORY  Past Surgical History:   Procedure Laterality Date   • AV FISTULA REVISION Right 8/11/2018    Procedure: AV FISTULA REVISION- Graft and Thrombectomy;  Surgeon: Shabbir Ardon M.D.;  Location: [facility];  Service: General   • AV FISTULA THROMBOLYSIS Right 8/11/2018    Procedure: AV FISTULA THROMBOLYSIS;  Surgeon: Shabbir Ardon M.D.;  Location: [facility];  Service: General   • AV FISTULA CREATION Right 12/9/2017    Procedure: AV FISTULA CREATION-ARM FOR GRAFT;  Surgeon: Lesly Jansen M.D.;  Location: [facility];  Service: General   • THROMBECTOMY  12/9/2017    Procedure: THROMBECTOMY;  Surgeon: Lesly Jansen M.D.;  Location: [facility];  Service: General   • THROMBECTOMY Right 8/21/2016    Procedure: THROMBECTOMY - right AV fistula graft with grams;  Surgeon: Shabbir Ardon M.D.;  Location: [facility];  Service:    • ANGIOPLASTY BALLOON  8/21/2016    Procedure: ANGIOPLASTY BALLOON;  Surgeon: Shabbir Ardon M.D.;  Location: [facility];  Service:    • THROMBECTOMY Right 8/20/2016    Procedure: THROMBECTOMY AV GRAFT;  Surgeon: Shabbir Ardon M.D.;  Location: [facility];  Service:    • AV FISTULA CREATION Right 7/12/2016    Procedure: AV FISTULA CREATION WITH GRAFT BRACHIAL AXILLARY;  Surgeon: Shabbir Ardon M.D.;  Location: [facility];  Service:    • CLOSED REDUCTION Right 7/5/2016    Procedure: CLOSED REDUCTION- Hip ;  Surgeon: Michael Holman M.D.;  Location: [facility];  Service:    • HIP ARTHROPLASTY TOTAL Right 1/18/2016    Procedure: HIP ARTHROPLASTY TOTAL;  Surgeon: Michael Holman M.D.;  Location: [facility];  Service:    • AV FISTULA CREATION  2/3/2015    Performed by Shabbir Ardon M.D. at [facility]   • AV FISTULA CREATION  11/14/2014    Performed by  Shabbir Ardon M.D. at SUR[facility]ERY [facility]   • AV FISTULA CREATION  9/9/2014    Performed by Shabbir Ardon M.D. at SURGERY [facility]   • CATH PLACEMENT  9/9/2014    Performed by Shabbir Ardon M.D. at SURGERY [facility]   • OTHER  5/2011    tracheostomy   • GASTROSCOPY-ENDO  4/27/2011    Performed by PALMIRA DOAN at ENDOSCOPY [facility]   • COLONOSCOPY WITH BIOPSY  4/20/2011    Performed by ALCIRA CRUZ at ENDOSCOPY [facility]   • GASTROSCOPY-ENDO  4/18/2011    Performed by ALCIRA CRUZ at ENDOSCOPY [facility]   • GASTROSCOPY-ENDO  4/10/2011    Performed by SYED JURADO at ENDOSCOPY [facility]   • SCLEROTHERAPHY  4/10/2011    Performed by SYED JURADO at [facility]   • OTHER ABDOMINAL SURGERY      kidney biopsy   • TRACHEOSTOMY         CURRENT MEDICATIONS  Home Medications    **Home medications have not yet been reviewed for this encounter**         ALLERGIES  Allergies   Allergen Reactions   • Lorazepam [Ativan] Anxiety     Patient becomes severely paranoid and agitated   • Morphine Itching     Tolerates Dilaudid   • Seasonal Runny Nose and Itching     Hay fever, saibha brush       PHYSICAL EXAM  VITAL SIGNS: BP (!) 202/120   Pulse (!) 105   Temp 36.3 °C (97.4 °F) (Temporal)   Resp 18   Wt 83 kg (182 lb 15.7 oz)   SpO2 94%   BMI 30.45 kg/m²    Constitutional: Chronically ill-appearing.  Skin is sallow and somewhat jaundiced in color.  Awake and alert.  Intermittently twitching.  Twitching seems to be predominantly localized to the left arm and left face although she has diffuse twitching at times.  HENT: Normocephalic, atraumatic; Bilateral external ears normal; Oropharynx with dry mucous membranes; No erythema or exudates in the posterior oropharynx.   Eyes: PERRL, EOMI, Conjunctiva normal. No discharge.   Neck:  Supple, nontender midline; No stridor; No nuchal rigidity.   Cardiovascular: Regular rate and rhythm  without murmurs, rubs, or gallop.   Thorax & Lungs: No respiratory distress, breath sounds clear to auscultation bilaterally without wheezing, rales or rhonchi. Nontender chest wall. No crepitus or subcutaneous air  Abdomen: Soft, nontender, bowel sounds normal. No obvious masses; No pulsatile masses; no rebound, guarding, or peritoneal signs.   Skin: Sallow in color.  No rash or petechiae.  Extremities: Distal radial, dorsalis pedis, posterior tibial pulses are equal bilaterally; No edema; Nontender calves or saphenous, No cyanosis, No clubbing.   Musculoskeletal: Good range of motion in all major joints. No tenderness to palpation or major deformities noted.   Neurologic: Alert & oriented x 2.  She knows she is at [facility].  She knows her name.  She thinks is 2010.  Patient seems to be having a hard time focusing and answering some questions.  However, at other times she seems to be very lucid and responds articulately and quickly to my questions.  She has intermittent twitching of her face and her extremities, mostly the left upper extremity.  During these twitching episodes she is awake and speaking to me.  No obvious seizure activity.      EKG  Please see my EKG interpretation below    RADIOLOGY/PROCEDURES  CT-HEAD W/O   Final Result      1.  Head CT without contrast within normal limits. No evidence of acute cerebral infarction, hemorrhage or mass lesion.      DX-CHEST-PORTABLE (1 VIEW)   Final Result      1.  Interstitial edema.      2.  Cardiomegaly.          COURSE & MEDICAL DECISION MAKING  Pertinent Labs & Imaging studies reviewed. (See chart for details)  Results for orders placed or performed during the hospital encounter of 08/12/19   CBC WITH DIFFERENTIAL   Result Value Ref Range    WBC 5.9 4.8 - 10.8 K/uL    RBC 3.24 (L) 4.20 - 5.40 M/uL    Hemoglobin 10.0 (L) 12.0 - 16.0 g/dL    Hematocrit 31.0 (L) 37.0 - 47.0 %    MCV 95.7 81.4 - 97.8 fL    MCH 30.9 27.0 - 33.0 pg     MCHC 32.3 (L) 33.6 - 35.0 g/dL    RDW 43.1 35.9 - 50.0 fL    Platelet Count 117 (L) 164 - 446 K/uL    MPV 10.8 9.0 - 12.9 fL    Neutrophils-Polys 71.40 44.00 - 72.00 %    Lymphocytes 15.90 (L) 22.00 - 41.00 %    Monocytes 9.30 0.00 - 13.40 %    Eosinophils 1.90 0.00 - 6.90 %    Basophils 0.50 0.00 - 1.80 %    Immature Granulocytes 1.00 (H) 0.00 - 0.90 %    Nucleated RBC 0.30 /100 WBC    Neutrophils (Absolute) 4.21 2.00 - 7.15 K/uL    Lymphs (Absolute) 0.94 (L) 1.00 - 4.80 K/uL    Monos (Absolute) 0.55 0.00 - 0.85 K/uL    Eos (Absolute) 0.11 0.00 - 0.51 K/uL    Baso (Absolute) 0.03 0.00 - 0.12 K/uL    Immature Granulocytes (abs) 0.06 0.00 - 0.11 K/uL    NRBC (Absolute) 0.02 K/uL   COMP METABOLIC PANEL   Result Value Ref Range    Sodium 141 135 - 145 mmol/L    Potassium 5.8 (H) 3.6 - 5.5 mmol/L    Chloride 100 96 - 112 mmol/L    Co2 12 (L) 20 - 33 mmol/L    Anion Gap 29.0 (H) 0.0 - 11.9    Glucose 76 65 - 99 mg/dL    Bun 127 (HH) 8 - 22 mg/dL    Creatinine 25.62 (HH) 0.50 - 1.40 mg/dL    Calcium 8.9 8.4 - 10.2 mg/dL    AST(SGOT) 12 12 - 45 U/L    ALT(SGPT) 11 2 - 50 U/L    Alkaline Phosphatase 79 30 - 99 U/L    Total Bilirubin 1.7 (H) 0.1 - 1.5 mg/dL    Albumin 3.7 3.2 - 4.9 g/dL    Total Protein 7.8 6.0 - 8.2 g/dL    Globulin 4.1 (H) 1.9 - 3.5 g/dL    A-G Ratio 0.9 g/dL   TROPONIN   Result Value Ref Range    Troponin T 40 (H) 6 - 19 ng/L   URINALYSIS (UA)   Result Value Ref Range    Color Yellow     Character Clear     Specific Gravity 1.020 <1.035    Ph 7.5 5.0 - 8.0    Glucose 100 (A) Negative mg/dL    Ketones 15 (A) Negative mg/dL    Protein >=300 (A) Negative mg/dL    Bilirubin Negative Negative    Nitrite Negative Negative    Leukocyte Esterase Negative Negative    Occult Blood Small (A) Negative    Micro Urine Req Microscopic    DIAGNOSTIC ALCOHOL   Result Value Ref Range    Diagnostic Alcohol 0.00 0.00 g/dL   HCG QUAL SERUM   Result Value Ref Range    Beta-Hcg Qualitative Serum Negative Negative   AMMONIA    Result Value Ref Range    Ammonia 27 11 - 45 umol/L   LACTIC ACID   Result Value Ref Range    Lactic Acid 1.0 0.5 - 2.0 mmol/L   ESTIMATED GFR   Result Value Ref Range    GFR If  2 (A) >60 mL/min/1.73 m 2    GFR If Non African American 1 (A) >60 mL/min/1.73 m 2   URINE MICROSCOPIC (W/UA)   Result Value Ref Range    WBC 10-20 (A) /hpf    RBC 2-5 (A) /hpf    Bacteria Rare (A) None /hpf    Epithelial Cells Few Few /hpf    Mucous Threads Few /hpf   Salicylate   Result Value Ref Range    Salicylates, Quant. <4 (L) 15 - 25 mg/dL   proBrain Natriuretic Peptide, NT   Result Value Ref Range    NT-proBNP >99418 (H) 0 - 125 pg/mL   HEP B SURFACE ANTIGEN   Result Value Ref Range    Hepatitis B Surface Antigen Negative Negative   Magnesium   Result Value Ref Range    Magnesium 4.1 (H) 1.5 - 2.5 mg/dL   POTASSIUM SERUM (K)   Result Value Ref Range    Potassium 3.0 (L) 3.6 - 5.5 mmol/L   ACCU-CHEK GLUCOSE   Result Value Ref Range    Glucose - Accu-Ck 85 65 - 99 mg/dL   EKG (NOW)   Result Value Ref Range    Report       [facility] Emergency Dept.    Test Date:  2019  Pt Name:    TAYLOR WARD               Department: [facility]  MRN:        [id number]                      Room:       3318  Gender:     Female                       Technician:   :        1980                   Requested By:RANDOLPH MARADIAGA  Order #:    [id number]                    Reading MD: Randolph Maradiaga    Measurements  Intervals                                Axis  Rate:       102                          P:          92  ND:         189                          QRS:        15  QRSD:       87                           T:          70  QT:         342  QTc:        446    Interpretive Statements  Sinus tachycardia rate 102  Normal axis  Normal intervals  ST depresssion V3-V6 of less than 1 mm  No ST elevation  Ventricular premature complex  Aberrant conduction of SV complex(es)  Minimal ST depression, lateral  leads  Baseline wander in lead(s) V2  Compared to ECG 12/07/2018 12:43:07  Ventricular premature comp shameka(es) now present      Electronically Signed On 8- 0:43:12 PDT by Kristyn Maradiaga       1538: Discussed case with Dr. Little, nephrologist on-call for Dr. Carvajal.  He states that based upon the patient's labs, she needs emergent dialysis.  He will set her up with dialysis promptly.  He will come to the ER to see her.  He agrees with ICU admission since her BUN and creatinine are still high.  He feels that her anion gap acidosis is likely related to her uremia.  Shortly after speaking to him on the phone, he came to the ER, evaluate her, and feels that her twitching movements are also related to her uremia.    1634: I spoke with Dr. Walls, intensivist.  She will see the patient here in the ICU and will place orders.    Patient presents to the ER with generalized weakness over the last 5 days.  She is also had some strange twitching over the last few days.  She has a seizure disorder and is on Vimpat.  The twitching she is exhibiting here today is likely more of a product of her uremia rather than a seizure or status.  She is able to talk to me and is awake and alert during these very sporadic and random bouts of twitching of her extremities and her face.  Patient is also confused.  At times she seems very lucid and answers questions articulating clearly.  Other times she gets kind of spacey, stares off into the room, and appears confused, not answering questions.  She said she missed 2 rounds of dialysis.  The nephrologist contacted the dialysis center and she is actually missed 3 rounds of dialysis.  Thankfully her potassium was 5.8.  EKG showed some questionable peaked T waves.  Her bicarb was quite low at 12.  Her anion gap is elevated at 29.  Her BUN is 127 her creatinine is 25.  Nephrology was immediately contacted and he has arrived promptly to evaluate the patient.  He will emergently dialyze her.  He feels  that her lab abnormalities and her twitching and generalized weakness is all related to her uremia.  Patient's blood pressure was high.  She was running in the 200 systolic range.  She was given some labetalol with improvement in her blood pressure, but it came right back up again.  She appears fluid overload on her chest x-ray.  She is not hypoxic however and is not in any respiratory distress.  There is no significant lower extremity edema.  BNP is greater than 35,000.  Dialysis has been emergently arranged.  She will go to the ICU under the care of both Dr. Walls and Dr. Little in very guarded condition.    CRITICAL CARE  The very real possibilty of a deterioration of this patient's condition required the highest level of my preparedness for sudden, emergent intervention.  I provided critical care services, which included medication orders, frequent reevaluations of the patient's condition and response to treatment, ordering and reviewing test results, and discussing the case with various consultants.  No      FINAL IMPRESSION  1. High anion gap metabolic acidosis Acute    2. Uremia Acute    3. Generalized weakness Acute    The critical care time associated with the care of the patient was 35 minutes. Review chart for interventions. This time is exclusive of any other billable procedures.      This dictation has been created using voice recognition software. The accuracy of the dictation is limited by the abilities of the software. I expect there may be some errors of grammar and possibly content. I made every attempt to manually correct the errors within my dictation. However, errors related to voice recognition software may still exist and should be interpreted within the appropriate context.      Electronically signed by: Kristyn Maradiaga, 8/12/2019 2:13 PM

## 2019-08-13 PROBLEM — R56.9 SEIZURES (HCC): Status: ACTIVE | Noted: 2019-01-05

## 2019-08-13 LAB
ALBUMIN SERPL BCP-MCNC: 3.4 G/DL (ref 3.2–4.9)
ALBUMIN/GLOB SERPL: 0.9 G/DL
ALP SERPL-CCNC: 75 U/L (ref 30–99)
ALT SERPL-CCNC: 12 U/L (ref 2–50)
ANION GAP SERPL CALC-SCNC: 21 MMOL/L (ref 0–11.9)
ANION GAP SERPL CALC-SCNC: 25 MMOL/L (ref 0–11.9)
AST SERPL-CCNC: 19 U/L (ref 12–45)
BASOPHILS # BLD AUTO: 0.5 % (ref 0–1.8)
BASOPHILS # BLD: 0.03 K/UL (ref 0–0.12)
BILIRUB SERPL-MCNC: 2 MG/DL (ref 0.1–1.5)
BUN SERPL-MCNC: 57 MG/DL (ref 8–22)
BUN SERPL-MCNC: 79 MG/DL (ref 8–22)
CALCIUM SERPL-MCNC: 8.7 MG/DL (ref 8.4–10.2)
CALCIUM SERPL-MCNC: 9.8 MG/DL (ref 8.4–10.2)
CHLORIDE SERPL-SCNC: 92 MMOL/L (ref 96–112)
CHLORIDE SERPL-SCNC: 97 MMOL/L (ref 96–112)
CO2 SERPL-SCNC: 21 MMOL/L (ref 20–33)
CO2 SERPL-SCNC: 22 MMOL/L (ref 20–33)
CREAT SERPL-MCNC: 12.86 MG/DL (ref 0.5–1.4)
CREAT SERPL-MCNC: 18.65 MG/DL (ref 0.5–1.4)
EKG IMPRESSION: NORMAL
EOSINOPHIL # BLD AUTO: 0.01 K/UL (ref 0–0.51)
EOSINOPHIL NFR BLD: 0.2 % (ref 0–6.9)
ERYTHROCYTE [DISTWIDTH] IN BLOOD BY AUTOMATED COUNT: 42.8 FL (ref 35.9–50)
GLOBULIN SER CALC-MCNC: 3.9 G/DL (ref 1.9–3.5)
GLUCOSE BLD-MCNC: 102 MG/DL (ref 65–99)
GLUCOSE SERPL-MCNC: 103 MG/DL (ref 65–99)
GLUCOSE SERPL-MCNC: 77 MG/DL (ref 65–99)
HCT VFR BLD AUTO: 29.3 % (ref 37–47)
HGB BLD-MCNC: 9.7 G/DL (ref 12–16)
IMM GRANULOCYTES # BLD AUTO: 0.02 K/UL (ref 0–0.11)
IMM GRANULOCYTES NFR BLD AUTO: 0.4 % (ref 0–0.9)
LYMPHOCYTES # BLD AUTO: 1.18 K/UL (ref 1–4.8)
LYMPHOCYTES NFR BLD: 20.8 % (ref 22–41)
MCH RBC QN AUTO: 31.2 PG (ref 27–33)
MCHC RBC AUTO-ENTMCNC: 33.1 G/DL (ref 33.6–35)
MCV RBC AUTO: 94.2 FL (ref 81.4–97.8)
MONOCYTES # BLD AUTO: 0.55 K/UL (ref 0–0.85)
MONOCYTES NFR BLD AUTO: 9.7 % (ref 0–13.4)
NEUTROPHILS # BLD AUTO: 3.88 K/UL (ref 2–7.15)
NEUTROPHILS NFR BLD: 68.4 % (ref 44–72)
NRBC # BLD AUTO: 0 K/UL
NRBC BLD-RTO: 0 /100 WBC
PLATELET # BLD AUTO: 126 K/UL (ref 164–446)
PMV BLD AUTO: 10.9 FL (ref 9–12.9)
POTASSIUM SERPL-SCNC: 4.6 MMOL/L (ref 3.6–5.5)
POTASSIUM SERPL-SCNC: 5 MMOL/L (ref 3.6–5.5)
PROT SERPL-MCNC: 7.3 G/DL (ref 6–8.2)
RBC # BLD AUTO: 3.11 M/UL (ref 4.2–5.4)
SODIUM SERPL-SCNC: 139 MMOL/L (ref 135–145)
SODIUM SERPL-SCNC: 139 MMOL/L (ref 135–145)
WBC # BLD AUTO: 5.7 K/UL (ref 4.8–10.8)

## 2019-08-13 PROCEDURE — 700105 HCHG RX REV CODE 258: Performed by: INTERNAL MEDICINE

## 2019-08-13 PROCEDURE — 80048 BASIC METABOLIC PNL TOTAL CA: CPT

## 2019-08-13 PROCEDURE — 5A1D70Z PERFORMANCE OF URINARY FILTRATION, INTERMITTENT, LESS THAN 6 HOURS PER DAY: ICD-10-PCS | Performed by: INTERNAL MEDICINE

## 2019-08-13 PROCEDURE — 99233 SBSQ HOSP IP/OBS HIGH 50: CPT | Performed by: INTERNAL MEDICINE

## 2019-08-13 PROCEDURE — 770022 HCHG ROOM/CARE - ICU (200)

## 2019-08-13 PROCEDURE — A9270 NON-COVERED ITEM OR SERVICE: HCPCS | Performed by: INTERNAL MEDICINE

## 2019-08-13 PROCEDURE — 94640 AIRWAY INHALATION TREATMENT: CPT

## 2019-08-13 PROCEDURE — 85025 COMPLETE CBC W/AUTO DIFF WBC: CPT

## 2019-08-13 PROCEDURE — 700111 HCHG RX REV CODE 636 W/ 250 OVERRIDE (IP): Performed by: INTERNAL MEDICINE

## 2019-08-13 PROCEDURE — 82962 GLUCOSE BLOOD TEST: CPT

## 2019-08-13 PROCEDURE — 700101 HCHG RX REV CODE 250: Performed by: INTERNAL MEDICINE

## 2019-08-13 PROCEDURE — 80053 COMPREHEN METABOLIC PANEL: CPT

## 2019-08-13 PROCEDURE — 90935 HEMODIALYSIS ONE EVALUATION: CPT

## 2019-08-13 PROCEDURE — 700112 HCHG RX REV CODE 229: Performed by: INTERNAL MEDICINE

## 2019-08-13 PROCEDURE — 700102 HCHG RX REV CODE 250 W/ 637 OVERRIDE(OP): Performed by: INTERNAL MEDICINE

## 2019-08-13 PROCEDURE — 700101 HCHG RX REV CODE 250

## 2019-08-13 PROCEDURE — 94760 N-INVAS EAR/PLS OXIMETRY 1: CPT

## 2019-08-13 RX ORDER — LIDOCAINE 50 MG/G
1 PATCH TOPICAL EVERY 24 HOURS
Status: DISCONTINUED | OUTPATIENT
Start: 2019-08-13 | End: 2019-08-21 | Stop reason: HOSPADM

## 2019-08-13 RX ORDER — ALBUMIN (HUMAN) 12.5 G/50ML
12.5 SOLUTION INTRAVENOUS
Status: DISCONTINUED | OUTPATIENT
Start: 2019-08-14 | End: 2019-08-21 | Stop reason: HOSPADM

## 2019-08-13 RX ORDER — OMEPRAZOLE 20 MG/1
20 CAPSULE, DELAYED RELEASE ORAL DAILY
Status: DISCONTINUED | OUTPATIENT
Start: 2019-08-13 | End: 2019-08-21 | Stop reason: HOSPADM

## 2019-08-13 RX ORDER — LACOSAMIDE 50 MG/1
100 TABLET ORAL 2 TIMES DAILY
Status: DISCONTINUED | OUTPATIENT
Start: 2019-08-13 | End: 2019-08-21 | Stop reason: HOSPADM

## 2019-08-13 RX ORDER — DOCUSATE SODIUM 100 MG/1
100 CAPSULE, LIQUID FILLED ORAL DAILY
Status: DISCONTINUED | OUTPATIENT
Start: 2019-08-13 | End: 2019-08-21 | Stop reason: HOSPADM

## 2019-08-13 RX ORDER — OXYCODONE HYDROCHLORIDE 10 MG/1
20 TABLET ORAL 4 TIMES DAILY
Status: DISCONTINUED | OUTPATIENT
Start: 2019-08-13 | End: 2019-08-13

## 2019-08-13 RX ORDER — LORAZEPAM 2 MG/ML
0.5 INJECTION INTRAMUSCULAR EVERY 4 HOURS PRN
Status: DISCONTINUED | OUTPATIENT
Start: 2019-08-13 | End: 2019-08-21 | Stop reason: HOSPADM

## 2019-08-13 RX ORDER — ASCORBIC ACID 500 MG
500 TABLET ORAL DAILY
Status: DISCONTINUED | OUTPATIENT
Start: 2019-08-13 | End: 2019-08-21 | Stop reason: HOSPADM

## 2019-08-13 RX ORDER — OXYCODONE HYDROCHLORIDE 10 MG/1
20 TABLET ORAL 3 TIMES DAILY
Status: DISCONTINUED | OUTPATIENT
Start: 2019-08-13 | End: 2019-08-14

## 2019-08-13 RX ORDER — SODIUM CHLORIDE 9 MG/ML
250 INJECTION, SOLUTION INTRAVENOUS
Status: DISCONTINUED | OUTPATIENT
Start: 2019-08-14 | End: 2019-08-21 | Stop reason: HOSPADM

## 2019-08-13 RX ORDER — HYDROMORPHONE HYDROCHLORIDE 1 MG/ML
1 INJECTION, SOLUTION INTRAMUSCULAR; INTRAVENOUS; SUBCUTANEOUS ONCE
Status: ACTIVE | OUTPATIENT
Start: 2019-08-13 | End: 2019-08-14

## 2019-08-13 RX ORDER — AMLODIPINE BESYLATE 5 MG/1
10 TABLET ORAL
Status: DISCONTINUED | OUTPATIENT
Start: 2019-08-13 | End: 2019-08-21 | Stop reason: HOSPADM

## 2019-08-13 RX ORDER — FERROUS SULFATE 325(65) MG
325 TABLET ORAL DAILY
Status: DISCONTINUED | OUTPATIENT
Start: 2019-08-13 | End: 2019-08-21 | Stop reason: HOSPADM

## 2019-08-13 RX ORDER — DEXMEDETOMIDINE HYDROCHLORIDE 100 UG/ML
INJECTION, SOLUTION INTRAVENOUS
Status: COMPLETED
Start: 2019-08-13 | End: 2019-08-13

## 2019-08-13 RX ADMIN — LIDOCAINE 1 PATCH: 50 PATCH TOPICAL at 09:49

## 2019-08-13 RX ADMIN — LACOSAMIDE 100 MG: 50 TABLET, FILM COATED ORAL at 10:42

## 2019-08-13 RX ADMIN — FERROUS SULFATE TAB 325 MG (65 MG ELEMENTAL FE) 325 MG: 325 (65 FE) TAB at 09:49

## 2019-08-13 RX ADMIN — ENALAPRILAT 1.25 MG: 2.5 INJECTION INTRAVENOUS at 22:20

## 2019-08-13 RX ADMIN — DOCUSATE SODIUM 100 MG: 100 CAPSULE, LIQUID FILLED ORAL at 09:48

## 2019-08-13 RX ADMIN — HEPARIN SODIUM 5000 UNITS: 5000 INJECTION, SOLUTION INTRAVENOUS; SUBCUTANEOUS at 21:25

## 2019-08-13 RX ADMIN — HEPARIN SODIUM 5000 UNITS: 5000 INJECTION, SOLUTION INTRAVENOUS; SUBCUTANEOUS at 05:42

## 2019-08-13 RX ADMIN — OMEPRAZOLE 20 MG: 20 CAPSULE, DELAYED RELEASE ORAL at 09:48

## 2019-08-13 RX ADMIN — LACOSAMIDE 100 MG: 50 TABLET, FILM COATED ORAL at 21:23

## 2019-08-13 RX ADMIN — DEXMEDETOMIDINE HYDROCHLORIDE 0.6 MCG/KG/HR: 100 INJECTION, SOLUTION INTRAVENOUS at 14:40

## 2019-08-13 RX ADMIN — DEXMEDETOMIDINE HYDROCHLORIDE 200 MCG: 100 INJECTION, SOLUTION INTRAVENOUS at 05:41

## 2019-08-13 RX ADMIN — DEXMEDETOMIDINE HYDROCHLORIDE 0.6 MCG/KG/HR: 100 INJECTION, SOLUTION INTRAVENOUS at 05:42

## 2019-08-13 RX ADMIN — HEPARIN SODIUM 5000 UNITS: 5000 INJECTION, SOLUTION INTRAVENOUS; SUBCUTANEOUS at 13:44

## 2019-08-13 RX ADMIN — CHOLECALCIFEROL (VITAMIN D3) 10 MCG (400 UNIT) TABLET 400 UNITS: at 09:48

## 2019-08-13 RX ADMIN — OXYCODONE HYDROCHLORIDE AND ACETAMINOPHEN 500 MG: 500 TABLET ORAL at 09:48

## 2019-08-13 RX ADMIN — OXYCODONE HYDROCHLORIDE 20 MG: 10 TABLET ORAL at 16:14

## 2019-08-13 ASSESSMENT — LIFESTYLE VARIABLES
EVER FELT BAD OR GUILTY ABOUT YOUR DRINKING: NO
TOTAL SCORE: 0
CONSUMPTION TOTAL: INCOMPLETE
EVER HAD A DRINK FIRST THING IN THE MORNING TO STEADY YOUR NERVES TO GET RID OF A HANGOVER: NO
DOES PATIENT WANT TO STOP DRINKING: NO
HAVE YOU EVER FELT YOU SHOULD CUT DOWN ON YOUR DRINKING: NO
CONSUMPTION TOTAL: INCOMPLETE
AVERAGE NUMBER OF DAYS PER WEEK YOU HAVE A DRINK CONTAINING ALCOHOL: 0
DOES PATIENT WANT TO STOP DRINKING: NO
HAVE PEOPLE ANNOYED YOU BY CRITICIZING YOUR DRINKING: NO
HOW MANY TIMES IN THE PAST YEAR HAVE YOU HAD 5 OR MORE DRINKS IN A DAY: 0
TOTAL SCORE: 0
TOTAL SCORE: 0
ON A TYPICAL DAY WHEN YOU DRINK ALCOHOL HOW MANY DRINKS DO YOU HAVE: 0
ALCOHOL_USE: NO

## 2019-08-13 ASSESSMENT — PATIENT HEALTH QUESTIONNAIRE - PHQ9
4. FEELING TIRED OR HAVING LITTLE ENERGY: SEVERAL DAYS
SUM OF ALL RESPONSES TO PHQ QUESTIONS 1-9: 13
7. TROUBLE CONCENTRATING ON THINGS, SUCH AS READING THE NEWSPAPER OR WATCHING TELEVISION: SEVERAL DAYS
9. THOUGHTS THAT YOU WOULD BE BETTER OFF DEAD, OR OF HURTING YOURSELF: SEVERAL DAYS
2. FEELING DOWN, DEPRESSED, IRRITABLE, OR HOPELESS: MORE THAN HALF THE DAYS
6. FEELING BAD ABOUT YOURSELF - OR THAT YOU ARE A FAILURE OR HAVE LET YOURSELF OR YOUR FAMILY DOWN: MORE THAN HALF THE DAYS
8. MOVING OR SPEAKING SO SLOWLY THAT OTHER PEOPLE COULD HAVE NOTICED. OR THE OPPOSITE, BEING SO FIGETY OR RESTLESS THAT YOU HAVE BEEN MOVING AROUND A LOT MORE THAN USUAL: SEVERAL DAYS
SUM OF ALL RESPONSES TO PHQ9 QUESTIONS 1 AND 2: 4
3. TROUBLE FALLING OR STAYING ASLEEP OR SLEEPING TOO MUCH: MORE THAN HALF THE DAYS
5. POOR APPETITE OR OVEREATING: SEVERAL DAYS
1. LITTLE INTEREST OR PLEASURE IN DOING THINGS: MORE THAN HALF THE DAYS

## 2019-08-13 ASSESSMENT — COGNITIVE AND FUNCTIONAL STATUS - GENERAL
SUGGESTED CMS G CODE MODIFIER MOBILITY: CH
SUGGESTED CMS G CODE MODIFIER DAILY ACTIVITY: CH
MOBILITY SCORE: 24
DAILY ACTIVITIY SCORE: 24

## 2019-08-13 ASSESSMENT — ENCOUNTER SYMPTOMS
CARDIOVASCULAR NEGATIVE: 1
ABDOMINAL PAIN: 0
FEVER: 0
BACK PAIN: 1
HEADACHES: 1
SHORTNESS OF BREATH: 1
SHORTNESS OF BREATH: 0
PSYCHIATRIC NEGATIVE: 1
EYES NEGATIVE: 1
NAUSEA: 1

## 2019-08-13 NOTE — PROGRESS NOTES
Per Dr Walls pt placed on HFNC. Pt tolerating at this time. All needs are being met. Pt still receiving dialysis.

## 2019-08-13 NOTE — PROGRESS NOTES
Fermín RN and Marizol FINCH bedside with pt. Pt reports feeling extremely uncomfortable and reports frequent seizure activity. IV keppra running.

## 2019-08-13 NOTE — PROGRESS NOTES
Paged Dr Louis regarding pt presentation of seizure like activity with pt reporting that she feels like she is having seizures. Dr Louis placed order for IV Keppra.

## 2019-08-13 NOTE — PROGRESS NOTES
Pt admitted from ER Report received from Luann FINCH POC  and orders reviewed. Pt up on [place] unable to transfer. Involuntary spastic movements noted. Pt on O2 6 liters oxymask. Pt sats 78% . RT called Dr mendieta at bedside order for Bipap and IV dilaudid received. Pt diaphoretic and tearful. Allen from dialysis at bedside. Right AV fistula accessed. Dialysis orders noted and verified. Bipap setting 7/17 with 100% Fio2. Pt alert.

## 2019-08-13 NOTE — PROGRESS NOTES
2130: Paged Dr Andrews regarding pt status post dialysis and heart rhythm. Per Dr Andrews obtain a potassium level one hour after the completion of dialysis.

## 2019-08-13 NOTE — PROGRESS NOTES
Report from NOC RN POC and orders reviewed. Dialysis RN at bedside treatment started. Pt alert calm confused. Dr Walls at bedside plan to titrate off Presidex. Assessment completed. Pt on High janis at 30 liters 50 Fi02. VSS

## 2019-08-13 NOTE — H&P
Pulmonary History & Physical Note    Date of Service  8/12/2019    Primary Care Physician  Sushila Manzano M.D.    Consultants  Nephrology    Code Status  Full    Chief Complaint  SOB and missed dialysis    History of Presenting Illness  38 y.o. female who presented 8/12/2019 with ESRD secondary to lupus nephritis with multiple missed sessions of dialysis,avascular necrosis of bilateral hip status post right hip replacement in the past, chronic pain with narcotic dependence, uncontrolled hypertension, DAVI not treated, obesity presenting with SOB and twitching.  Found to have severe azotemia with BUN > 100, k 5.8, CXR c/w pulm edema  No obvious seizures. Ct heard negative  She is in overt resp distress on 10 l oxygen/min saturating at 84%  Improved with starting BIPAP  Being admitted to ICU for emergent dialysis    Review of Systems  ROS  Pt in respiratory distress unable to talk    Past Medical History   has a past medical history of Arthritis, Avascular necrosis of bones of both hips ([facility]) (10/10/2016), Clostridium difficile colitis (5/3/2011), Dialysis patient, ESBL (extended spectrum beta-lactamase) producing bacteria infection (8/25/2014), Fibromyalgia, Hypertension, Lupus ([facility]), Pneumonia (), Psychiatric disorder, Pyelonephritis (11/21/2017), Renal failure, Sepsis due to pneumonia ([facility]) (6/7/2018), and Status epilepticus ([facility]) (12/13/2018). She also has no past medical history of Chronic airway obstruction, not elsewhere classified or Fall.    Surgical History   has a past surgical history that includes gastroscopy-endo (4/10/2011); sclerotheraphy (4/10/2011); gastroscopy-endo (4/18/2011); colonoscopy with biopsy (4/20/2011); gastroscopy-endo (4/27/2011); other (5/2011); other abdominal surgery; av fistula creation (9/9/2014); cath placement (9/9/2014); av fistula creation (11/14/2014); av fistula creation (2/3/2015); hip arthroplasty total (Right, 1/18/2016); closed reduction (Right, 7/5/2016); av  fistula creation (Right, 7/12/2016); thrombectomy (Right, 8/20/2016); thrombectomy (Right, 8/21/2016); angioplasty balloon (8/21/2016); tracheostomy; av fistula creation (Right, 12/9/2017); thrombectomy (12/9/2017); av fistula revision (Right, 8/11/2018); and av fistula thrombolysis (Right, 8/11/2018).     Family History  family history includes Cancer in her father; Heart Disease in her mother.     Social History   reports that she quit smoking about 8 years ago. Her smoking use included cigarettes. She has a 9.00 pack-year smoking history. She has never used smokeless tobacco. She reports that she does not drink alcohol or use drugs.    Allergies  Allergies   Allergen Reactions   • Lorazepam [Ativan] Anxiety     Patient becomes severely paranoid and agitated   • Morphine Itching     Tolerates Dilaudid   • Seasonal Runny Nose and Itching     Hay fever, sabiha brush       Medications  Prior to Admission Medications   Prescriptions Last Dose Informant Patient Reported? Taking?   Cholecalciferol (VITAMIN D3) 40398 UNIT Cap unknown at Unknown time Patient Yes No   Sig: Take 10,000 Units by mouth every day.   Oxycodone HCl 20 MG Tab unknown at Unknown time Patient Yes No   Sig: Take 20 mg by mouth 4 times a day.   amLODIPine (NORVASC) 10 MG Tab unknown at Unknown time Patient Yes No   Sig: Take 10 mg by mouth 1 time daily as needed (If blood pressure is > 170).   ascorbic acid (ASCORBIC ACID) 500 MG Tab unknown at Unknown time Patient Yes No   Sig: Take 500 mg by mouth every day.   docusate sodium (COLACE) 100 MG Cap unknown at Unknown time Patient Yes No   Sig: Take 100 mg by mouth every day.   ferrous sulfate 325 (65 FE) MG tablet unknown at Unknown time Patient Yes No   Sig: Take 325 mg by mouth every day.   hydroxychloroquine (PLAQUENIL) 200 MG Tab unknown at Unknown time Patient Yes No   Sig: Take 200 mg by mouth every bedtime.   lacosamide (VIMPAT) 100 MG Tab tablet unknown at Unknown time Patient Yes No   Sig: Take  100 mg by mouth 2 Times a Day.   lidocaine (LIDODERM) 5 % Patch unknown at Unknown time Patient Yes No   Sig: Apply 1 Patch to skin as directed as needed (For back pain). On for 12 hours off for 12 hours    omeprazole (PRILOSEC) 20 MG delayed-release capsule unknown at Unknown time Patient No No   Sig: Take 1 Cap by mouth every day.   pregabalin (LYRICA) 75 MG Cap unknown at Unknown time Patient No No   Sig: Take 1 Cap by mouth 3 times a day for 30 days.   promethazine (PHENERGAN) 25 MG Tab unknown at Unknown time  No No   Sig: TAKE 1 TABLET BY MOUTH EVERY 8 HOURS AS NEEDED FOR NAUSEA OR VOMITING   sevelamer carbonate (RENVELA) 800 MG Tab tablet unknown at Unknown time Patient Yes No   Sig: Take 1,600-3,200 mg by mouth See Admin Instructions. 1,600 mg with snacks   3,200 mg with meals   tizanidine (ZANAFLEX) 4 MG Tab unknown at Unknown time Patient Yes No   Sig: Take 8 mg by mouth every bedtime.   traZODone (DESYREL) 50 MG Tab unknown at Unknown time Patient No No   Sig: Take 1 Tab by mouth every bedtime.      Facility-Administered Medications: None       Physical Exam  Temp:  [36.3 °C (97.4 °F)] 36.3 °C (97.4 °F)  Pulse:  [] 114  Resp:  [13-51] 25  BP: (167-235)/(100-127) 235/117  SpO2:  [74 %-99 %] 99 %    Physical Exam   Constitutional: She appears distressed.   Eyes: Pupils are equal, round, and reactive to light. Conjunctivae and EOM are normal.   Neck: JVD present. No tracheal deviation present. No thyromegaly present.   Cardiovascular:   tachycardic   Pulmonary/Chest:   Tachypnea and in overt distress   Abdominal: She exhibits distension. There is no tenderness.   Musculoskeletal: She exhibits edema.   Lymphadenopathy:     She has no cervical adenopathy.   Neurological: She is alert.   Twitching but alert    Skin: She is diaphoretic.   Psychiatric:   Agitated but able to respond  Saying she cannot breathe       Laboratory:  Recent Labs     08/12/19  1424   WBC 5.9   RBC 3.24*   HEMOGLOBIN 10.0*  "  HEMATOCRIT 31.0*   MCV 95.7   MCH 30.9   MCHC 32.3*   RDW 43.1   PLATELETCT 117*   MPV 10.8     Recent Labs     08/12/19  1424   SODIUM 141   POTASSIUM 5.8*   CHLORIDE 100   CO2 12*   GLUCOSE 76   *   CREATININE 25.62*   CALCIUM 8.9     Recent Labs     08/12/19  1424   ALTSGPT 11   ASTSGOT 12   ALKPHOSPHAT 79   TBILIRUBIN 1.7*   GLUCOSE 76         Recent Labs     08/12/19  1424   NTPROBNP >39224*         Recent Labs     08/12/19  1424   TROPONINT 40*       Urinalysis:    Recent Labs     08/12/19  1459   SPECGRAVITY 1.020   GLUCOSEUR 100*   KETONES 15*   NITRITE Negative   LEUKESTERAS Negative   WBCURINE 10-20*   RBCURINE 2-5*   BACTERIA Rare*   EPITHELCELL Few        Imaging:  CXR today personally viewed showed diffuse interstitial edema      Assessment/Plan:  I anticipate this patient will require at least two midnights for appropriate medical management, necessitating inpatient admission.    ESRD (end stage renal disease) on dialysis (HCC)- (present on admission)  Assessment & Plan  From lupus nephritis  Missed multiple dialysis sessions  Appreciate nephrology consult  At this time azotemic with symptoms  Currently receiving dialysis      Acute respiratory failure with hypoxia (HCC)- (present on admission)  Assessment & Plan  From pulmonary edema from volume overload  Started BIPAP right now while dialysis running to see if we can support her till we get fluid off  Low threshold to intubate    Azotemia  Assessment & Plan  From noncompliance with dialysis  Dangerously high BUN with \"jerks\"  Reassess blood work after dialysis    Hypertension- (present on admission)  Assessment & Plan  From fluid overload   Prn meds for now as we remove fluid      VTE prophylaxis: heparin tid    Critical care time managing acute hypoxic resp failure is 31 mins    Addendum 7:15 pm  D/w Dr. Douglas  Will removed another 700 ml  Concern for cerebral edema due to the azotemia and so can's increase the dialysate rate as that would " worsen the edema  Need time for equilibrium  Started on precedex to help with now delirium  Also added nicardipine drip to target systolic less than 170  Signed out to hospitalist team as guarded prognosis and may need intubation if pt progresses

## 2019-08-13 NOTE — PROGRESS NOTES
Patient tolerated her 2.5 hr Hd and net UF 2700 ml. RUE AVG was cannulated with #15 needle cannula. Patient Bp were elevated from the start of tx. Dr Douglas increase hd time to 2.5 hrs and UF 2700 ml. AVG + B/T post dialysis. Vs are better post Hd. Report given to her Primary RN.

## 2019-08-13 NOTE — FLOWSHEET NOTE
08/13/19 0700   Heated Hi Flow Nasal Cannula   Heated Hi Flow Nasal Cannula (HHFNC) Yes   FiO2 (HHFNC) 40   Flowrate (HHFNC) 30   Humidifier Temperature (FNC) 31   Chest Exam   Respiration 18   Pulse 77   Oximetry   #Pulse Oximetry (Single Determination) Yes   Continuous Oximetry Yes   Oxygen   Pulse Oximetry 93 %   O2 (LPM) 30   FiO2% 40 %   O2 Daily Delivery Respiratory  Highflow Nasal Cannula

## 2019-08-13 NOTE — PROGRESS NOTES
Nephrology Daily Progress Note    Date of Service  8/13/2019    Chief Complaint  38 y.o. female with ESRD on HD MWF admitted 8/12/2019 with twitching, missed dialysis    Interval Problem Update  8/13 - Patient got HD last night with 2.7L UF and HD again this morning with 3L UF. Seen this afternoon, remains somewhat confused. Denies SOB. Denies chest pain.     Review of Systems  Review of Systems   Constitutional: Negative for fever.   Respiratory: Negative for shortness of breath.    Cardiovascular: Negative for chest pain.   Gastrointestinal: Negative for abdominal pain.   All other systems reviewed and are negative.       Physical Exam  Temp:  [36.5 °C (97.7 °F)-38.1 °C (100.5 °F)] 37.4 °C (99.4 °F)  Pulse:  [] 67  Resp:  [10-72] 18  BP: ()/() 122/71  SpO2:  [56 %-100 %] 96 %    Physical Exam   Constitutional: She appears well-developed. She appears listless. No distress. Nasal cannula in place.   HENT:   Mouth/Throat: Oropharynx is clear and moist. No oropharyngeal exudate.   Eyes: EOM are normal. No scleral icterus.   Neck: No tracheal deviation present.   Cardiovascular: Normal heart sounds.   No murmur heard.  Pulmonary/Chest: Effort normal. No stridor. She has no rales.   Abdominal: Soft. There is no tenderness.   Musculoskeletal: Normal range of motion. She exhibits edema (trace LE).   Neurological: She appears listless.   Slow to respond. Moving all extremities. No twitching   Skin: Skin is warm and dry.       Fluids    Intake/Output Summary (Last 24 hours) at 8/13/2019 1658  Last data filed at 8/13/2019 1600  Gross per 24 hour   Intake 1298.63 ml   Output 6500 ml   Net -5201.37 ml       Laboratory  Labs reviewed, pertinent labs below.  Recent Labs     08/12/19  1424 08/13/19  0400   WBC 5.9 5.7   RBC 3.24* 3.11*   HEMOGLOBIN 10.0* 9.7*   HEMATOCRIT 31.0* 29.3*   MCV 95.7 94.2   MCH 30.9 31.2   MCHC 32.3* 33.1*   RDW 43.1 42.8   PLATELETCT 117* 126*   MPV 10.8 10.9     Recent Labs      08/12/19  1424 08/12/19  2230 08/13/19  0400 08/13/19  1542   SODIUM 141  --  139 139   POTASSIUM 5.8* 3.0* 5.0 4.6   CHLORIDE 100  --  97 92*   CO2 12*  --  21 22   GLUCOSE 76  --  77 103*   *  --  79* 57*   CREATININE 25.62*  --  18.65* 12.86*   CALCIUM 8.9  --  8.7 9.8               URINALYSIS:  Lab Results   Component Value Date/Time    COLORURINE Yellow 08/12/2019 1459    CLARITY Clear 08/12/2019 1459    SPECGRAVITY 1.020 08/12/2019 1459    PHURINE 7.5 08/12/2019 1459    KETONES 15 (A) 08/12/2019 1459    PROTEINURIN >=300 (A) 08/12/2019 1459    BILIRUBINUR Negative 08/12/2019 1459    UROBILU 0.2 01/01/2019 0705    NITRITE Negative 08/12/2019 1459    LEUKESTERAS Negative 08/12/2019 1459    OCCULTBLOOD Small (A) 08/12/2019 1459     UPC  Lab Results   Component Value Date/Time    TOTPROTUR 65.0 (H) 01/21/2015 1520      Lab Results   Component Value Date/Time    CREATININEU 65.60 01/21/2015 1520         Imaging reviewed  CT-HEAD W/O   Final Result      1.  Head CT without contrast within normal limits. No evidence of acute cerebral infarction, hemorrhage or mass lesion.      DX-CHEST-PORTABLE (1 VIEW)   Final Result      1.  Interstitial edema.      2.  Cardiomegaly.            Current Facility-Administered Medications   Medication Dose Route Frequency Provider Last Rate Last Dose   • LORazepam (ATIVAN) injection 0.5 mg  0.5 mg Intravenous Q4HRS PRN Orlin Louis D.O.       • HYDROmorphone pf (DILAUDID) injection 1 mg  1 mg Intravenous Once Cornel Little M.D.   Stopped at 08/13/19 0715   • amLODIPine (NORVASC) tablet 10 mg  10 mg Oral QDAY PRN Cornel Little M.D.       • ascorbic acid tablet 500 mg  500 mg Oral DAILY Cornel Little M.D.   500 mg at 08/13/19 0948   • cholecalciferol (VITAMIN D3) tablet 400 Units  400 Units Oral DAILY Cornel Little M.D.   400 Units at 08/13/19 0948   • docusate sodium (COLACE) capsule 100 mg  100 mg Oral DAILY Cornel Little M.D.   100  mg at 08/13/19 0948   • ferrous sulfate tablet 325 mg  325 mg Oral DAILY Cornel Little M.D.   325 mg at 08/13/19 0949   • lacosamide (VIMPAT) tablet 100 mg  100 mg Oral BID Cornel Little M.D.   100 mg at 08/13/19 1042   • lidocaine (LIDODERM) 5 % 1 Patch  1 Patch Transdermal Q24HR Cornel Ltitle M.D.   1 Patch at 08/13/19 0949   • omeprazole (PRILOSEC) capsule 20 mg  20 mg Oral DAILY Cornel Little M.D.   20 mg at 08/13/19 0948   • dexmedetomidine (PRECEDEX) 400 mcg in  mL infusion  0.1-1.5 mcg/kg/hr Intravenous Continuous Cornel Little M.D. 4.1 mL/hr at 08/13/19 1617 0.2 mcg/kg/hr at 08/13/19 1617   • oxyCODONE immediate release (ROXICODONE) tablet 20 mg  20 mg Oral TID Cornel Little M.D.   20 mg at 08/13/19 1614   • [START ON 8/14/2019] NS (BOLUS) infusion 250 mL  250 mL Intravenous DIALYSIS PRN Bora Douglas M.D.       • [START ON 8/14/2019] albumin human 25% solution 12.5 g  12.5 g Intravenous DIALYSIS PRN Bora Douglas M.D.       • [START ON 8/14/2019] lidocaine (XYLOCAINE) 1 % injection 1 mL  1 mL Intradermal PRN Bora Douglas M.D.       • NS (BOLUS) infusion 250 mL  250 mL Intravenous DIALYSIS PRN Bora Douglas M.D.       • albumin human 25% solution 12.5 g  12.5 g Intravenous DIALYSIS PRN Bora Douglas M.D.       • lidocaine (XYLOCAINE) 1 % injection 1 mL  1 mL Intradermal PRN Bora Douglas M.D.       • senna-docusate (PERICOLACE or SENOKOT S) 8.6-50 MG per tablet 2 Tab  2 Tab Oral BID Cornel Little M.D.   Stopped at 08/12/19 1800    And   • polyethylene glycol/lytes (MIRALAX) PACKET 1 Packet  1 Packet Oral QDAY PRCAMI Little M.D.        And   • magnesium hydroxide (MILK OF MAGNESIA) suspension 30 mL  30 mL Oral QDAY PRCAMI Little M.D.        And   • bisacodyl (DULCOLAX) suppository 10 mg  10 mg Rectal QDAY PRCAMI Little M.D.       • heparin injection 5,000 Units  5,000 Units Subcutaneous Q8HRS Cornel Little M.D.    5,000 Units at 08/13/19 1344   • enalaprilat (VASOTEC) injection 1.25 mg  1.25 mg Intravenous Q6HRS PRN Cornel Little M.D.       • hydrALAZINE (APRESOLINE) injection 20 mg  20 mg Intravenous Q6HRS PRN Cornel Little M.D.             Assessment/Plan  38 y.o. Female with ESRD on HD who presented 8/12/2019 with twitching, missed HD.      1. ESRD on HD MWF. Oliguric. Continue re-initiation of HD given uremia, today is HD #2. Will continue with HD thrice weekly starting tomorrow.     2. Twitching, improved. Likely due to uremia. Continue HD as above     3. Accelerated HTN, improving. Likely due to volume overload. UF with HD as tolerated.      4. Anemia of CKD. Resume epogen with HD. Check CBC daily.     5. Hyperkalemia, improved with HD.     6. Elevated anion-gap metabolic acidosis, improved with HD.          Bora Douglas MD  Nephrology

## 2019-08-13 NOTE — FLOWSHEET NOTE
08/13/19 1042   Heated Hi Flow Nasal Cannula   Heated Hi Flow Nasal Cannula (HHFNC) Yes   FiO2 (HHFNC) 40   Flowrate (HHFNC) 30   Humidifier Temperature (HHFNC) 31   Chest Exam   Respiration (!) 23   Pulse (!) 103   Oximetry   #Pulse Oximetry (Single Determination) Yes   Oxygen   Pulse Oximetry 93 %   O2 (LPM) 30   FiO2% 40 %   O2 Daily Delivery Respiratory  Highflow Nasal Cannula

## 2019-08-13 NOTE — CARE PLAN
Problem: Safety  Goal: Will remain free from injury  Description  SZ precautions implemented.    8/13/2019 0226 by Barb Copeland R.N.  Outcome: PROGRESSING AS EXPECTED  8/13/2019 0220 by Barb Copeland R.N.  Outcome: PROGRESSING AS EXPECTED  Intervention: Provide assistance with mobility  Note:   Seizure precautions in place to protect pt. Pt resting in bed with all safety measures in place.      Problem: Respiratory:  Goal: Respiratory status will improve  8/13/2019 0226 by Barb Copeland R.N.  Outcome: PROGRESSING AS EXPECTED  8/13/2019 0220 by Barb Copeland R.N.  Outcome: PROGRESSING AS EXPECTED  Intervention: Administer and titrate oxygen therapy  Note:   Pt weaning down on HFNC. No longer requiring BIPAP.

## 2019-08-13 NOTE — CONSULTS
"Nephrology Consultation    Date of Service  8/12/2019    Referring Physician  FRANCHESKA De La Paz.*    Consulting Physician  Bora Douglas M.D.    Reason for Consultation  Management of ESRD on HD      History of Presenting Illness  38 y.o. Female with ESRD on HD who presented 8/12/2019 with twitching, missed HD.     The patient is not able to provide much history. She says \"I am having a hard time.\" She complains of some SOB, but can't explain more about it. She thinks her last HD was five days prior to admission, but outpatient HD records show last HD was on 8/2/19. She says \"I forgot I had dialysis.\"    The patient dialyzes via RUE AVG.     Review of Systems  Review of Systems   Unable to perform ROS: Mental acuity       Past Medical History  Past Medical History:   Diagnosis Date   • Arthritis     all joints,r/t lupus   • Avascular necrosis of bones of both hips ([facility]) 10/10/2016   • Clostridium difficile colitis 5/3/2011   • Dialysis patient    • ESBL (extended spectrum beta-lactamase) producing bacteria infection 8/25/2014   • Fibromyalgia    • Hypertension    • Lupus ([facility])    • Pneumonia    • Psychiatric disorder     anxiety, depression   • Pyelonephritis 11/21/2017   • Renal failure    • Sepsis due to pneumonia ([facility]) 6/7/2018   • Status epilepticus ([facility]) 12/13/2018       Surgical History  Past Surgical History:   Procedure Laterality Date   • AV FISTULA REVISION Right 8/11/2018    Procedure: AV FISTULA REVISION- Graft and Thrombectomy;  Surgeon: Shabbir Ardon M.D.;  Location: [facility];  Service: General   • AV FISTULA THROMBOLYSIS Right 8/11/2018    Procedure: AV FISTULA THROMBOLYSIS;  Surgeon: Shabbir Ardon M.D.;  Location: SURGERY [facility];  Service: General   • AV FISTULA CREATION Right 12/9/2017    Procedure: AV FISTULA CREATION-ARM FOR GRAFT;  Surgeon: Lesly Jansen M.D.;  Location: [facility];  Service: General   • THROMBECTOMY  12/9/2017    " Procedure: THROMBECTOMY;  Surgeon: Lesly Jansen M.D.;  Location: SURGERY [facility];  Service: General   • THROMBECTOMY Right 8/21/2016    Procedure: THROMBECTOMY - right AV fistula graft with grams;  Surgeon: Shabbir Ardon M.D.;  Location: SURGERY [facility];  Service:    • ANGIOPLASTY BALLOON  8/21/2016    Procedure: ANGIOPLASTY BALLOON;  Surgeon: Shabbir Ardon M.D.;  Location: SURGERY [facility];  Service:    • THROMBECTOMY Right 8/20/2016    Procedure: THROMBECTOMY AV GRAFT;  Surgeon: Shabbir Ardon M.D.;  Location: SURGERY [facility];  Service:    • AV FISTULA CREATION Right 7/12/2016    Procedure: AV FISTULA CREATION WITH GRAFT BRACHIAL AXILLARY;  Surgeon: Shabbir Ardon M.D.;  Location: SURGERY [facility];  Service:    • CLOSED REDUCTION Right 7/5/2016    Procedure: CLOSED REDUCTION- Hip ;  Surgeon: Michael Holman M.D.;  Location: SURGERY [facility];  Service:    • HIP ARTHROPLASTY TOTAL Right 1/18/2016    Procedure: HIP ARTHROPLASTY TOTAL;  Surgeon: Michael Holman M.D.;  Location: [facility];  Service:    • AV FISTULA CREATION  2/3/2015    Performed by Shabbir Ardon M.D. at SURGERY [facility]   • AV FISTULA CREATION  11/14/2014    Performed by Shabbir Ardon M.D. at [facility]   • AV FISTULA CREATION  9/9/2014    Performed by Shabbir Ardon M.D. at SURGERY [facility]   • CATH PLACEMENT  9/9/2014    Performed by Shabbir Ardon M.D. at SURGERY [facility]   • OTHER  5/2011    tracheostomy   • GASTROSCOPY-ENDO  4/27/2011    Performed by PALMIRA DOAN at ENDOSCOPY [facility]   • COLONOSCOPY WITH BIOPSY  4/20/2011    Performed by ALCIRA CRUZ at ENDOSCOPY [facility]   • GASTROSCOPY-ENDO  4/18/2011    Performed by ALCIRA CRUZ at ENDOSCOPY [facility]   • GASTROSCOPY-ENDO  4/10/2011    Performed by SYED JURADO at ENDOSCOPY [facility]   • SCLEROTHERAPHY  4/10/2011     Performed by SYED JURADO at ENDOSCOPY [facility] ORS   • OTHER ABDOMINAL SURGERY      kidney biopsy   • TRACHEOSTOMY         Family History  Family History   Problem Relation Age of Onset   • Heart Disease Mother    • Cancer Father        Social History  Social History     Socioeconomic History   • Marital status: Single     Spouse name: Not on file   • Number of children: Not on file   • Years of education: Not on file   • Highest education level: Not on file   Occupational History   • Not on file   Social Needs   • Financial resource strain: Not on file   • Food insecurity:     Worry: Not on file     Inability: Not on file   • Transportation needs:     Medical: Not on file     Non-medical: Not on file   Tobacco Use   • Smoking status: Former Smoker     Packs/day: 0.50     Years: 18.00     Pack years: 9.00     Types: Cigarettes     Last attempt to quit: 2011     Years since quittin.1   • Smokeless tobacco: Never Used   • Tobacco comment:  ppd   Substance and Sexual Activity   • Alcohol use: No     Alcohol/week: 0.0 oz   • Drug use: No     Types: Marijuana   • Sexual activity: Not on file   Lifestyle   • Physical activity:     Days per week: Not on file     Minutes per session: Not on file   • Stress: Not on file   Relationships   • Social connections:     Talks on phone: Not on file     Gets together: Not on file     Attends Jain service: Not on file     Active member of club or organization: Not on file     Attends meetings of clubs or organizations: Not on file     Relationship status: Not on file   • Intimate partner violence:     Fear of current or ex partner: Not on file     Emotionally abused: Not on file     Physically abused: Not on file     Forced sexual activity: Not on file   Other Topics Concern   •  Service No   • Blood Transfusions Yes   • Caffeine Concern No   • Occupational Exposure No   • Hobby Hazards No   • Sleep Concern Yes   • Stress Concern Yes   • Weight  Concern Yes   • Special Diet Yes   • Back Care No   • Exercise Yes   • Bike Helmet No   • Seat Belt Yes   • Self-Exams Yes   Social History Narrative   • Not on file       Medications  Current Facility-Administered Medications   Medication Dose Route Frequency Provider Last Rate Last Dose   • NS (BOLUS) infusion 250 mL  250 mL Intravenous DIALYSIS PRN Bora Douglas M.D.       • albumin human 25% solution 12.5 g  12.5 g Intravenous DIALYSIS PRN Bora Douglas M.D.       • lidocaine (XYLOCAINE) 1 % injection 1 mL  1 mL Intradermal PRN Bora Douglas M.D.       • senna-docusate (PERICOLACE or SENOKOT S) 8.6-50 MG per tablet 2 Tab  2 Tab Oral BID Cornel Little M.D.        And   • polyethylene glycol/lytes (MIRALAX) PACKET 1 Packet  1 Packet Oral QDAY PRN Cornel Little M.D.        And   • magnesium hydroxide (MILK OF MAGNESIA) suspension 30 mL  30 mL Oral QDAY PRN Cornel Little M.D.        And   • bisacodyl (DULCOLAX) suppository 10 mg  10 mg Rectal QDAY PRN Cornel Little M.D.       • heparin injection 5,000 Units  5,000 Units Subcutaneous Q8HRS Cornel Little M.D.       • enalaprilat (VASOTEC) injection 1.25 mg  1.25 mg Intravenous Q6HRS PRN Cornel Little M.D.         Current Outpatient Medications   Medication Sig Dispense Refill   • promethazine (PHENERGAN) 25 MG Tab TAKE 1 TABLET BY MOUTH EVERY 8 HOURS AS NEEDED FOR NAUSEA OR VOMITING 60 Tab 0   • Cholecalciferol (VITAMIN D3) 12662 UNIT Cap Take 10,000 Units by mouth every day.     • Oxycodone HCl 20 MG Tab Take 20 mg by mouth 4 times a day.     • lacosamide (VIMPAT) 100 MG Tab tablet Take 100 mg by mouth 2 Times a Day.     • [START ON 8/13/2019] pregabalin (LYRICA) 75 MG Cap Take 1 Cap by mouth 3 times a day for 30 days. 90 Cap 1   • amLODIPine (NORVASC) 10 MG Tab Take 10 mg by mouth 1 time daily as needed (If blood pressure is > 170).     • lidocaine (LIDODERM) 5 % Patch Apply 1 Patch to skin as directed as needed (For back  pain). On for 12 hours off for 12 hours      • docusate sodium (COLACE) 100 MG Cap Take 100 mg by mouth every day.     • tizanidine (ZANAFLEX) 4 MG Tab Take 8 mg by mouth every bedtime.     • traZODone (DESYREL) 50 MG Tab Take 1 Tab by mouth every bedtime. 90 Tab 3   • omeprazole (PRILOSEC) 20 MG delayed-release capsule Take 1 Cap by mouth every day. 30 Cap 3   • sevelamer carbonate (RENVELA) 800 MG Tab tablet Take 1,600-3,200 mg by mouth See Admin Instructions. 1,600 mg with snacks   3,200 mg with meals     • ferrous sulfate 325 (65 FE) MG tablet Take 325 mg by mouth every day.     • ascorbic acid (ASCORBIC ACID) 500 MG Tab Take 500 mg by mouth every day.     • hydroxychloroquine (PLAQUENIL) 200 MG Tab Take 200 mg by mouth every bedtime.         Allergies  Allergies   Allergen Reactions   • Lorazepam [Ativan] Anxiety     Patient becomes severely paranoid and agitated   • Morphine Itching     Tolerates Dilaudid   • Seasonal Runny Nose and Itching     Hay fever, sabiha brush       Physical Exam  Temp:  [36.3 °C (97.4 °F)] 36.3 °C (97.4 °F)  Pulse:  [] 102  Resp:  [13-51] 51  BP: (167-228)/(100-127) 228/127  SpO2:  [74 %-99 %] 99 %    Physical Exam   Constitutional: She appears well-developed. She appears listless. She appears ill. She appears distressed.   Eyes: EOM are normal. No scleral icterus.   Neck: No tracheal deviation present.   Cardiovascular: Normal heart sounds.   No murmur heard.  Pulmonary/Chest: Effort normal. No stridor. She has rales.   Abdominal: Soft. There is no tenderness.   Musculoskeletal: Normal range of motion. She exhibits no edema.   Neurological: She appears listless.   Involuntary muscle twitches noted. Patient able to regard this examiner and speak while having these twitches.    Skin: Skin is warm and dry.   Psychiatric:   Appears anxious   Access: RUE AVG, +bruit and thrill      Fluids      Laboratory  Labs reviewed, pertinent labs below.  Recent Labs     08/12/19  1424   WBC 5.9    RBC 3.24*   HEMOGLOBIN 10.0*   HEMATOCRIT 31.0*   MCV 95.7   MCH 30.9   MCHC 32.3*   RDW 43.1   PLATELETCT 117*   MPV 10.8     Recent Labs     08/12/19  1424   SODIUM 141   POTASSIUM 5.8*   CHLORIDE 100   CO2 12*   GLUCOSE 76   *   CREATININE 25.62*   CALCIUM 8.9                URINALYSIS:  Lab Results   Component Value Date/Time    COLORURINE Yellow 08/12/2019 1459    CLARITY Clear 08/12/2019 1459    SPECGRAVITY 1.020 08/12/2019 1459    PHURINE 7.5 08/12/2019 1459    KETONES 15 (A) 08/12/2019 1459    PROTEINURIN >=300 (A) 08/12/2019 1459    BILIRUBINUR Negative 08/12/2019 1459    UROBILU 0.2 01/01/2019 0705    NITRITE Negative 08/12/2019 1459    LEUKESTERAS Negative 08/12/2019 1459    OCCULTBLOOD Small (A) 08/12/2019 1459     UPC  Lab Results   Component Value Date/Time    TOTPROTUR 65.0 (H) 01/21/2015 1520      Lab Results   Component Value Date/Time    CREATININEU 65.60 01/21/2015 1520       Imaging reviewed  CT-HEAD W/O   Final Result      1.  Head CT without contrast within normal limits. No evidence of acute cerebral infarction, hemorrhage or mass lesion.      DX-CHEST-PORTABLE (1 VIEW)   Final Result      1.  Interstitial edema.      2.  Cardiomegaly.            Assessment/Plan  38 y.o. Female with ESRD on HD who presented 8/12/2019 with twitching, missed HD.     1. ESRD on HD MWF. Oliguric. Patient with missed HD and signs of uremia. Will plan on re-initiation of HD over three days. Plan for UF with HD as tolerated.    2. Twitching, likely due to uremia. Plan for HD re-initiation as above.    3. Accelerated HTN, likely due to volume overload from missed HD. Plan for UF with HD as tolerated.     4. Anemia of CKD. Will hold epogen with accelerated HTN. Check CBC daily.    5. Hyperkalemia, due to missed HD. Plan for HD re-initiation as above.    6. Elevated anion-gap metabolic acidosis. Lactate and salicylate normal. Likely due to ESRD and missed HD. Plan for HD as above.    Plan discussed with   Hiren in the ER.       Bora Douglas MD  Nephrology

## 2019-08-13 NOTE — CARE PLAN
Pt has waves of confusion and requires frequent reorienting. Pt tolerating HFNC at this time. Seizure precautions in place.

## 2019-08-13 NOTE — FLOWSHEET NOTE
08/12/19 2201   Events/Summary/Plan   Events/Summary/Plan HHFNC   Heated Hi Flow Nasal Cannula   Heated Hi Flow Nasal Cannula (HHFNC) Yes   FiO2 (HHFNC) 90   Flowrate (HHFNC) 40   Humidifier Temperature (HHFNC) 31   Chest Exam   Respiration 12   Pulse 90   Oximetry   Continuous Oximetry Yes   Oxygen   Pulse Oximetry 99 %

## 2019-08-13 NOTE — PROGRESS NOTES
Received report from Day RN and assumed care of pt. Pt is alert and on bipap. Pt attempting to take off bipap, Dr Walls present at bedside. Precedex gtt initiated. Nicardipine gtt ordered for high bp. Pt receiving dialysis now.

## 2019-08-13 NOTE — PROGRESS NOTES
[facility] Progress Note      HD ordered by Dr. Douglas. Treatment started at 0700 and ended at 0930.     Total Net UF 3000mL.    Pt tolerated treatment well with no issues. See paper flow sheet for details. Cannulation needles taken out, held pressure for 10 min and placed gauze dressing with no bleeding. SHELBI AVG + for bruit/thrill. Report given to IMANI Canales RN.

## 2019-08-13 NOTE — DISCHARGE PLANNING
Pt currently lives in an apartment alone, has support from her grandma. Pt is on dialysis but has been non-compliant. Pt was given information on group homes during previous admissions along with transportation services such as MTM. Pt has no mental health/substance abuse history. Pt receives about $800/ month on SSI. LSW will f/u with pt and family regarding non-compliance.     Care Transition Team Assessment    Information Source  Orientation : Disoriented to Time, Disoriented to Place  Information Given By: Patient  Who is responsible for making decisions for patient? : Patient    Readmission Evaluation  Is this a readmission?: Yes - unplanned readmission    Elopement Risk  Legal Hold: No  Ambulatory or Self Mobile in Wheelchair: No-Not an Elopement Risk  Elopement Risk: Not at Risk for Elopement    Interdisciplinary Discharge Planning  Patient or legal guardian wants to designate a caregiver (see row info): Yes  Caregiver name: Shruthi  Caregiver relationship to patient: Grandmother   Caregiver contact info: [phone]  ([facility] [place] Authorization for Release of Health Information has been completed: Yes    Discharge Preparedness  What is your plan after discharge?: Uncertain - pending medical team collaboration  What are your discharge supports?: Grandparent  Prior Functional Level: Needs Assist with Activities of Daily Living, Needs Assist with Medication Management  Difficulity with IADLs: Driving  Difficulity with IADL Comments: (family assists)    Functional Assesment  Prior Functional Level: Needs Assist with Activities of Daily Living, Needs Assist with Medication Management    Finances  Financial Barriers to Discharge: No  Prescription Coverage: Yes    Vision / Hearing Impairment  Vision Impairment : Yes  Right Eye Vision: Wears Contacts  Left Eye Vision: Wears Contacts  Hearing Impairment : No         Advance Directive  Advance Directive?: None    Domestic Abuse  Have you ever been the victim of abuse or  violence?: No  Physical Abuse or Sexual Abuse: No  Verbal Abuse or Emotional Abuse: No  Possible Abuse Reported to:: Not Applicable    Psychological Assessment  History of Substance Abuse: None  History of Psychiatric Problems: No  Non-compliant with Treatment: Yes    Discharge Risks or Barriers  Discharge risks or barriers?: Lives alone, no community support, Complex medical needs, Non-adherence to medication or treatment  Patient risk factors: Complex medical needs, Lives alone and no community support, Noncompliance, Readmission    Anticipated Discharge Information  Anticipated discharge disposition: Discharge needs currently unknown

## 2019-08-13 NOTE — FLOWSHEET NOTE
08/12/19 1951   Events/Summary/Plan   Events/Summary/Plan [facility] on [facility]. Placed patient ion HHFNC   Heated Hi Flow Nasal Cannula   Heated Hi Flow Nasal Cannula (HHFNC) Yes   FiO2 (HHFNC) 100   Flowrate (HHFNC) 50   Humidifier Temperature (HHFNC) 30   Date of Last Circuit Change 08/12/19   Circuit Change Next Due Date (Q 7 Days) 09/11/19   Chest Exam   Respiration (!) 35   Pulse (!) 121   Oximetry   Continuous Oximetry Yes   Oxygen   Pulse Oximetry (!) 85 %  (cme up to 94)   O2 (LPM) 50   FiO2% 100 %   O2 Daily Delivery Respiratory  Highflow Nasal Cannula

## 2019-08-13 NOTE — PROGRESS NOTES
Presidex gtt stopped per  order, Pt screaming out yelling uncooperative. Pt c/o back/leg pain. Pt pulling at lines and O2. Discussed with Dr Walls. New order received. Pt able to take routine PO medications floated in applesauce. Lidoderm patch applied.

## 2019-08-13 NOTE — PROGRESS NOTES
Critical Care Progress Note    Date of admission  8/12/2019    Chief Complaint  38 y.o. female admitted 8/12/2019 with twitching and SOB    Hospital Course    38 y.o. 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  Found to have severe azotemia with BUN > 100, k 5.8, CXR c/w pulm edema  No obvious seizures. Ct heard negative  She is in overt resp distress on 10 l oxygen/min saturating at 84%  Improved with starting BIPAP  Admitted to ICU for emergent dialysis on 8/12/19      Interval Problem Update  Reviewed last 24 hour events:  Dialysis * 2 currently receiving  Seizure for one hr received ativan then started hallucinating  Started on keppra bid  No sedate but arousable and not delirious  k + 5 and BUN 78    Review of Systems  Review of Systems   Unable to perform ROS: Acuity of condition   Constitutional: Positive for malaise/fatigue.   HENT: Negative.    Eyes: Negative.    Respiratory: Positive for shortness of breath.    Cardiovascular: Negative.    Gastrointestinal: Positive for nausea.   Genitourinary: Negative.    Musculoskeletal: Positive for back pain.   Skin: Negative.    Neurological: Positive for headaches.   Endo/Heme/Allergies: Negative.    Psychiatric/Behavioral: Negative.         Vital Signs for last 24 hours   Temp:  [36.3 °C (97.4 °F)-38.1 °C (100.5 °F)] 36.5 °C (97.7 °F)  Pulse:  [] 77  Resp:  [10-72] 18  BP: ()/() 167/98  SpO2:  [56 %-100 %] 95 %       Physical Exam   Physical Exam   Constitutional: She is oriented to person, place, and time. She appears well-developed and well-nourished. No distress.   HENT:   Head: Normocephalic and atraumatic.   Right Ear: External ear normal.   Left  Ear: External ear normal.   Nose: Nose normal.   Mouth/Throat: Oropharynx is clear and moist. No oropharyngeal exudate.   Eyes: Pupils are equal, round, and reactive to light. Conjunctivae and EOM are normal. Right eye exhibits no discharge. Left eye exhibits no discharge. No scleral icterus.   Neck: Normal range of motion. Neck supple. No tracheal deviation present. No thyromegaly present.   Cardiovascular: Normal rate and regular rhythm.   Pulmonary/Chest: Effort normal and breath sounds normal. No stridor.   Abdominal: Soft. Bowel sounds are normal. She exhibits distension. She exhibits no mass.   Musculoskeletal: She exhibits no edema.   Lymphadenopathy:     She has no cervical adenopathy.   Neurological: She is alert and oriented to person, place, and time.   Skin: Skin is warm and dry. No rash noted. She is not diaphoretic. No erythema.   Psychiatric: She has a normal mood and affect.       Medications  Current Facility-Administered Medications   Medication Dose Route Frequency Provider Last Rate Last Dose   • LORazepam (ATIVAN) injection 0.5 mg  0.5 mg Intravenous Q4HRS PRN Orlin Louis D.O.       • HYDROmorphone pf (DILAUDID) injection 1 mg  1 mg Intravenous Once Cornel Little M.D.   Stopped at 08/13/19 0715   • amLODIPine (NORVASC) tablet 10 mg  10 mg Oral QDAY PRN Cornel Little M.D.       • ascorbic acid tablet 500 mg  500 mg Oral DAILY Cornel Little M.D.       • cholecalciferol (VITAMIN D3) tablet 400 Units  400 Units Oral DAILY Cornel Little M.D.       • docusate sodium (COLACE) capsule 100 mg  100 mg Oral DAILY Cornel Little M.D.       • ferrous sulfate tablet 325 mg  325 mg Oral DAILY Cornel Little M.D.       • lacosamide (VIMPAT) tablet 100 mg  100 mg Oral BID Cornel Little M.D.       • lidocaine (LIDODERM) 5 % 1 Patch  1 Patch Transdermal Q24HR Cornel Little M.D.       • omeprazole (PRILOSEC) capsule 20 mg  20 mg Oral DAILY Cornel  RAMON Little       • NS (BOLUS) infusion 250 mL  250 mL Intravenous DIALYSIS PRN Bora Douglas M.D.       • albumin human 25% solution 12.5 g  12.5 g Intravenous DIALYSIS PRN Bora Douglas M.D.       • lidocaine (XYLOCAINE) 1 % injection 1 mL  1 mL Intradermal PRN Bora Douglas M.D.       • senna-docusate (PERICOLACE or SENOKOT S) 8.6-50 MG per tablet 2 Tab  2 Tab Oral BID Cornel Little M.D.   Stopped at 08/12/19 1800    And   • polyethylene glycol/lytes (MIRALAX) PACKET 1 Packet  1 Packet Oral QDAY PRN Cornel Little M.D.        And   • magnesium hydroxide (MILK OF MAGNESIA) suspension 30 mL  30 mL Oral QDAY PRN Cornel Little M.D.        And   • bisacodyl (DULCOLAX) suppository 10 mg  10 mg Rectal QDAY PRN Cornel Little M.D.       • heparin injection 5,000 Units  5,000 Units Subcutaneous Q8HRS Cornel Little M.D.   5,000 Units at 08/13/19 0542   • enalaprilat (VASOTEC) injection 1.25 mg  1.25 mg Intravenous Q6HRS PRCAMI Little M.D.       • hydrALAZINE (APRESOLINE) injection 20 mg  20 mg Intravenous Q6HRS PRCAMI Little M.D.           Fluids    Intake/Output Summary (Last 24 hours) at 8/13/2019 0907  Last data filed at 8/13/2019 0800  Gross per 24 hour   Intake 542.57 ml   Output 3000 ml   Net -2457.43 ml       Laboratory          Recent Labs     08/12/19  1424 08/12/19  2230 08/13/19  0400   SODIUM 141  --  139   POTASSIUM 5.8* 3.0* 5.0   CHLORIDE 100  --  97   CO2 12*  --  21   *  --  79*   CREATININE 25.62*  --  18.65*   MAGNESIUM 4.1*  --   --    CALCIUM 8.9  --  8.7     Recent Labs     08/12/19  1424 08/13/19  0400   ALTSGPT 11 12   ASTSGOT 12 19   ALKPHOSPHAT 79 75   TBILIRUBIN 1.7* 2.0*   GLUCOSE 76 77     Recent Labs     08/12/19  1424 08/13/19  0400   WBC 5.9 5.7   NEUTSPOLYS 71.40 68.40   LYMPHOCYTES 15.90* 20.80*   MONOCYTES 9.30 9.70   EOSINOPHILS 1.90 0.20   BASOPHILS 0.50 0.50   ASTSGOT 12 19   ALTSGPT 11 12   ALKPHOSPHAT 79 75  "  TBILIRUBIN 1.7* 2.0*     Recent Labs     08/12/19  1424 08/13/19  0400   RBC 3.24* 3.11*   HEMOGLOBIN 10.0* 9.7*   HEMATOCRIT 31.0* 29.3*   PLATELETCT 117* 126*       Imaging  No additional imaging    Assessment/Plan  ESRD (end stage renal disease) on dialysis (HCC)- (present on admission)  Assessment & Plan  From lupus nephritis  Dialysis now session 2 via fistula  Appreciate nephrology consult  ** holding plaquenil and sevalemer at this time till d/w nephrology re: restarting       Acute respiratory failure with hypoxia (HCC)- (present on admission)  Assessment & Plan  Improving with dialysis almost 4 liters off since yesterday  Crackles are minimal, no JVD  On hiflo 60% but can wean to nasal cannula    Azotemia  Assessment & Plan  Also her \" twitches\" have resolved  BUN this am 78 before dialysis this am    Seizures (HCC)  Assessment & Plan  Exacerbated with the azotemia  Received ativan and keppra  restarting home Vimpat as she is also starting a diet  Stopping Keppra    Hypertension- (present on admission)  Assessment & Plan  Improved and starting home amlodipine       VTE:  Heparin  Ulcer: H2 Antagonist  Lines: None patient with port    I have performed a physical exam and reviewed and updated ROS and Plan today (8/13/2019). In review of yesterday's note (8/12/2019), there are no changes except as documented above.     Discussed patient condition with RN and RT  "

## 2019-08-13 NOTE — ASSESSMENT & PLAN NOTE
Hypotension post dialysis but improving with blood return from dialysis  Holding parameters for amlodipine

## 2019-08-13 NOTE — PROGRESS NOTES
Updated MD on pt condition. PRN ativan order was placed by MD for seizure activity. Safety measures in place. Seizure precautions in place.

## 2019-08-13 NOTE — FLOWSHEET NOTE
08/13/19 1355   Heated Hi Flow Nasal Cannula   FiO2 (HHFNC) 40   Flowrate (HHFNC) 30   Humidifier Temperature (FNC) 31   Chest Exam   Respiration 19   Pulse 92   Oximetry   #Pulse Oximetry (Single Determination) Yes   Oxygen   Pulse Oximetry 98 %   O2 (LPM) 30   FiO2% 40 %   O2 Daily Delivery Respiratory  Highflow Nasal Cannula

## 2019-08-13 NOTE — ASSESSMENT & PLAN NOTE
From lupus nephritis  Dialysis now session 3 via fistula -- cramping during dialysis   Appreciate nephrology consult  ** holding plaquenil and sevalemer at this time till d/w nephrology re: restarting

## 2019-08-14 LAB
ANION GAP SERPL CALC-SCNC: 25 MMOL/L (ref 0–11.9)
BUN SERPL-MCNC: 68 MG/DL (ref 8–22)
CALCIUM SERPL-MCNC: 9.7 MG/DL (ref 8.4–10.2)
CHLORIDE SERPL-SCNC: 93 MMOL/L (ref 96–112)
CO2 SERPL-SCNC: 18 MMOL/L (ref 20–33)
CREAT SERPL-MCNC: 14.36 MG/DL (ref 0.5–1.4)
EKG IMPRESSION: NORMAL
ERYTHROCYTE [DISTWIDTH] IN BLOOD BY AUTOMATED COUNT: 41.2 FL (ref 35.9–50)
GLUCOSE SERPL-MCNC: 81 MG/DL (ref 65–99)
HCT VFR BLD AUTO: 39.9 % (ref 37–47)
HGB BLD-MCNC: 13.1 G/DL (ref 12–16)
MCH RBC QN AUTO: 30.4 PG (ref 27–33)
MCHC RBC AUTO-ENTMCNC: 32.8 G/DL (ref 33.6–35)
MCV RBC AUTO: 92.6 FL (ref 81.4–97.8)
PLATELET # BLD AUTO: 148 K/UL (ref 164–446)
PMV BLD AUTO: 11.2 FL (ref 9–12.9)
POTASSIUM SERPL-SCNC: 4 MMOL/L (ref 3.6–5.5)
RBC # BLD AUTO: 4.31 M/UL (ref 4.2–5.4)
SODIUM SERPL-SCNC: 136 MMOL/L (ref 135–145)
WBC # BLD AUTO: 15 K/UL (ref 4.8–10.8)

## 2019-08-14 PROCEDURE — 94640 AIRWAY INHALATION TREATMENT: CPT

## 2019-08-14 PROCEDURE — 700101 HCHG RX REV CODE 250: Performed by: INTERNAL MEDICINE

## 2019-08-14 PROCEDURE — 93010 ELECTROCARDIOGRAM REPORT: CPT | Performed by: INTERNAL MEDICINE

## 2019-08-14 PROCEDURE — 80048 BASIC METABOLIC PNL TOTAL CA: CPT

## 2019-08-14 PROCEDURE — 99233 SBSQ HOSP IP/OBS HIGH 50: CPT | Performed by: INTERNAL MEDICINE

## 2019-08-14 PROCEDURE — 700112 HCHG RX REV CODE 229: Performed by: INTERNAL MEDICINE

## 2019-08-14 PROCEDURE — A9270 NON-COVERED ITEM OR SERVICE: HCPCS | Performed by: INTERNAL MEDICINE

## 2019-08-14 PROCEDURE — 700105 HCHG RX REV CODE 258: Performed by: INTERNAL MEDICINE

## 2019-08-14 PROCEDURE — 85027 COMPLETE CBC AUTOMATED: CPT

## 2019-08-14 PROCEDURE — 93005 ELECTROCARDIOGRAM TRACING: CPT | Performed by: HOSPITALIST

## 2019-08-14 PROCEDURE — 700102 HCHG RX REV CODE 250 W/ 637 OVERRIDE(OP): Performed by: INTERNAL MEDICINE

## 2019-08-14 PROCEDURE — 94760 N-INVAS EAR/PLS OXIMETRY 1: CPT

## 2019-08-14 PROCEDURE — 770020 HCHG ROOM/CARE - TELE (206)

## 2019-08-14 PROCEDURE — 90935 HEMODIALYSIS ONE EVALUATION: CPT

## 2019-08-14 PROCEDURE — 700111 HCHG RX REV CODE 636 W/ 250 OVERRIDE (IP): Performed by: INTERNAL MEDICINE

## 2019-08-14 RX ORDER — ONDANSETRON 2 MG/ML
4 INJECTION INTRAMUSCULAR; INTRAVENOUS EVERY 4 HOURS PRN
Status: DISCONTINUED | OUTPATIENT
Start: 2019-08-14 | End: 2019-08-21 | Stop reason: HOSPADM

## 2019-08-14 RX ORDER — SODIUM CHLORIDE 9 MG/ML
200 INJECTION, SOLUTION INTRAVENOUS ONCE
Status: COMPLETED | OUTPATIENT
Start: 2019-08-14 | End: 2019-08-14

## 2019-08-14 RX ORDER — OXYCODONE HYDROCHLORIDE 10 MG/1
20 TABLET ORAL 4 TIMES DAILY
Status: DISCONTINUED | OUTPATIENT
Start: 2019-08-14 | End: 2019-08-21 | Stop reason: HOSPADM

## 2019-08-14 RX ADMIN — OXYCODONE HYDROCHLORIDE AND ACETAMINOPHEN 500 MG: 500 TABLET ORAL at 05:51

## 2019-08-14 RX ADMIN — ONDANSETRON HYDROCHLORIDE 4 MG: 2 INJECTION, SOLUTION INTRAMUSCULAR; INTRAVENOUS at 08:23

## 2019-08-14 RX ADMIN — HEPARIN SODIUM 5000 UNITS: 5000 INJECTION, SOLUTION INTRAVENOUS; SUBCUTANEOUS at 05:52

## 2019-08-14 RX ADMIN — LIDOCAINE 1 PATCH: 50 PATCH TOPICAL at 09:45

## 2019-08-14 RX ADMIN — HEPARIN SODIUM 5000 UNITS: 5000 INJECTION, SOLUTION INTRAVENOUS; SUBCUTANEOUS at 21:19

## 2019-08-14 RX ADMIN — CHOLECALCIFEROL (VITAMIN D3) 10 MCG (400 UNIT) TABLET 400 UNITS: at 05:51

## 2019-08-14 RX ADMIN — OMEPRAZOLE 20 MG: 20 CAPSULE, DELAYED RELEASE ORAL at 05:51

## 2019-08-14 RX ADMIN — SENNOSIDES AND DOCUSATE SODIUM 2 TABLET: 8.6; 5 TABLET ORAL at 05:51

## 2019-08-14 RX ADMIN — SENNOSIDES AND DOCUSATE SODIUM 2 TABLET: 8.6; 5 TABLET ORAL at 17:17

## 2019-08-14 RX ADMIN — OXYCODONE HYDROCHLORIDE 20 MG: 10 TABLET ORAL at 13:17

## 2019-08-14 RX ADMIN — SODIUM CHLORIDE 200 ML: 9 INJECTION, SOLUTION INTRAVENOUS at 16:20

## 2019-08-14 RX ADMIN — HYDRALAZINE HYDROCHLORIDE 20 MG: 20 INJECTION INTRAMUSCULAR; INTRAVENOUS at 01:15

## 2019-08-14 RX ADMIN — ONDANSETRON HYDROCHLORIDE 4 MG: 2 INJECTION, SOLUTION INTRAMUSCULAR; INTRAVENOUS at 21:19

## 2019-08-14 RX ADMIN — OXYCODONE HYDROCHLORIDE 20 MG: 10 TABLET ORAL at 09:12

## 2019-08-14 RX ADMIN — DOCUSATE SODIUM 100 MG: 100 CAPSULE, LIQUID FILLED ORAL at 05:51

## 2019-08-14 RX ADMIN — LACOSAMIDE 100 MG: 50 TABLET, FILM COATED ORAL at 05:52

## 2019-08-14 RX ADMIN — LACOSAMIDE 100 MG: 50 TABLET, FILM COATED ORAL at 17:17

## 2019-08-14 RX ADMIN — AMLODIPINE BESYLATE 10 MG: 5 TABLET ORAL at 01:26

## 2019-08-14 RX ADMIN — HEPARIN SODIUM 5000 UNITS: 5000 INJECTION, SOLUTION INTRAVENOUS; SUBCUTANEOUS at 13:16

## 2019-08-14 RX ADMIN — OXYCODONE HYDROCHLORIDE 20 MG: 10 TABLET ORAL at 21:19

## 2019-08-14 RX ADMIN — ENALAPRILAT 1.25 MG: 2.5 INJECTION INTRAVENOUS at 04:04

## 2019-08-14 RX ADMIN — OXYCODONE HYDROCHLORIDE 20 MG: 10 TABLET ORAL at 03:12

## 2019-08-14 RX ADMIN — FERROUS SULFATE TAB 325 MG (65 MG ELEMENTAL FE) 325 MG: 325 (65 FE) TAB at 05:51

## 2019-08-14 ASSESSMENT — ENCOUNTER SYMPTOMS
ABDOMINAL PAIN: 0
CARDIOVASCULAR NEGATIVE: 1
FEVER: 0
HEADACHES: 1
BACK PAIN: 1
SHORTNESS OF BREATH: 1
SHORTNESS OF BREATH: 0
PSYCHIATRIC NEGATIVE: 1
EYES NEGATIVE: 1
NAUSEA: 1

## 2019-08-14 NOTE — PROGRESS NOTES
Pt having severe cramping of bilat LE's during HD, heat packs placed, Pt restless and unable to lie still. Dialysis RN stopped pulling. Pt's BP dropped and was unreadable , HD RN gave back 500 cc's blood, BP then improved, Dr Walls at bedside and nephrology notified by HD RN.

## 2019-08-14 NOTE — PROGRESS NOTES
Critical Care Progress Note    Date of admission  8/12/2019    Chief Complaint  38 y.o. female admitted 8/12/2019 with twitching and SOB    Hospital Course    38 y.o. 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  Found to have severe azotemia with BUN > 100, k 5.8, CXR c/w pulm edema  No obvious seizures. Ct heard negative  She is in overt resp distress on 10 l oxygen/min saturating at 84%  Improved with starting BIPAP  Admitted to ICU for emergent dialysis on 8/12/19      Interval Problem Update  Reviewed last 24 hour events:  Dialysis * 3 currently receiving  Markedly improved neurological status  Labs look better  [place] oxygen being titrated down    Review of Systems  Review of Systems   Unable to perform ROS: Acuity of condition   Constitutional: Positive for malaise/fatigue.   HENT: Negative.    Eyes: Negative.    Respiratory: Positive for shortness of breath.    Cardiovascular: Negative.    Gastrointestinal: Positive for nausea.   Genitourinary: Negative.    Musculoskeletal: Positive for back pain.   Skin: Negative.    Neurological: Positive for headaches.   Endo/Heme/Allergies: Negative.    Psychiatric/Behavioral: Negative.         Vital Signs for last 24 hours   Temp:  [36.9 °C (98.4 °F)-37.7 °C (99.9 °F)] 36.9 °C (98.4 °F)  Pulse:  [] 121  Resp:  [7-35] 12  BP: ()/() 110/64  SpO2:  [80 %-99 %] 91 %       Physical Exam   Physical Exam   Constitutional: She is oriented to person, place, and time. She appears well-developed and well-nourished. No distress.   HENT:   Head: Normocephalic and atraumatic.   Right Ear: External ear normal.   Left Ear: External ear normal.   Nose: Nose normal.    Mouth/Throat: Oropharynx is clear and moist. No oropharyngeal exudate.   Eyes: Pupils are equal, round, and reactive to light. Conjunctivae and EOM are normal. Right eye exhibits no discharge. Left eye exhibits no discharge. No scleral icterus.   Neck: Normal range of motion. Neck supple. No tracheal deviation present. No thyromegaly present.   Cardiovascular: Normal rate and regular rhythm.   Pulmonary/Chest: Effort normal and breath sounds normal. No stridor.   Abdominal: Soft. Bowel sounds are normal. She exhibits distension. She exhibits no mass.   Musculoskeletal: She exhibits no edema.   Lymphadenopathy:     She has no cervical adenopathy.   Neurological: She is alert and oriented to person, place, and time.   Skin: Skin is warm and dry. No rash noted. She is not diaphoretic. No erythema.   Psychiatric: She has a normal mood and affect.       Medications  Current Facility-Administered Medications   Medication Dose Route Frequency Provider Last Rate Last Dose   • ondansetron (ZOFRAN) syringe/vial injection 4 mg  4 mg Intravenous Q4HRS PRN Cornel Little M.D.   4 mg at 08/14/19 0823   • oxyCODONE immediate release (ROXICODONE) tablet 20 mg  20 mg Oral 4X/DAY Cornel Little M.D.   20 mg at 08/14/19 0912   • LORazepam (ATIVAN) injection 0.5 mg  0.5 mg Intravenous Q4HRS PRN Orlin Louis D.O.       • amLODIPine (NORVASC) tablet 10 mg  10 mg Oral QDAY PRN Cornel Little M.D.   10 mg at 08/14/19 0126   • ascorbic acid tablet 500 mg  500 mg Oral DAILY Cornel Little M.D.   500 mg at 08/14/19 0551   • cholecalciferol (VITAMIN D3) tablet 400 Units  400 Units Oral DAILY Cornel Little M.D.   400 Units at 08/14/19 0551   • docusate sodium (COLACE) capsule 100 mg  100 mg Oral DAILY Cornel Little M.D.   100 mg at 08/14/19 0551   • ferrous sulfate tablet 325 mg  325 mg Oral DAILY Cornel Little M.D.   325 mg at 08/14/19 0551   • lacosamide (VIMPAT) tablet 100 mg  100 mg  Oral BID Cornel Little M.D.   100 mg at 08/14/19 0552   • lidocaine (LIDODERM) 5 % 1 Patch  1 Patch Transdermal Q24HR Cornel Little M.D.   1 Patch at 08/14/19 0945   • omeprazole (PRILOSEC) capsule 20 mg  20 mg Oral DAILY Cornel Little M.D.   20 mg at 08/14/19 0551   • dexmedetomidine (PRECEDEX) 400 mcg in  mL infusion  0.1-1.5 mcg/kg/hr Intravenous Continuous Cornel Little M.D.   Stopped at 08/13/19 1733   • NS (BOLUS) infusion 250 mL  250 mL Intravenous DIALYSIS PRN Bora Douglas M.D.       • albumin human 25% solution 12.5 g  12.5 g Intravenous DIALYSIS PRN Bora Douglas M.D.       • lidocaine (XYLOCAINE) 1 % injection 1 mL  1 mL Intradermal PRN Bora Douglas M.D.       • epoetin rk (EPOGEN,PROCRIT) injection 2,000 Units  2,000 Units Subcutaneous MO, WE + FR Bora Douglas M.D.   Stopped at 08/14/19 0900    And   • epoetin rk (EPOGEN,PROCRIT) injection 2,000 Units  2,000 Units Subcutaneous MO, WE + FR Bora Douglas M.D.   Stopped at 08/14/19 0900   • NS (BOLUS) infusion 250 mL  250 mL Intravenous DIALYSIS PRN Bora Douglas M.D.       • albumin human 25% solution 12.5 g  12.5 g Intravenous DIALYSIS PRN Bora Douglas M.D.       • lidocaine (XYLOCAINE) 1 % injection 1 mL  1 mL Intradermal PRN Bora Douglas M.D.       • senna-docusate (PERICOLACE or SENOKOT S) 8.6-50 MG per tablet 2 Tab  2 Tab Oral BID Cornel Little M.D.   2 Tab at 08/14/19 0551    And   • polyethylene glycol/lytes (MIRALAX) PACKET 1 Packet  1 Packet Oral QDAY PRN Cornel Little M.D.        And   • magnesium hydroxide (MILK OF MAGNESIA) suspension 30 mL  30 mL Oral QDAY PRN Cornel Little M.D.        And   • bisacodyl (DULCOLAX) suppository 10 mg  10 mg Rectal QDAY PRN Cornel Little M.D.       • heparin injection 5,000 Units  5,000 Units Subcutaneous Q8HRS Cornel Little M.D.   5,000 Units at 08/14/19 0552   • enalaprilat (VASOTEC) injection 1.25 mg  1.25 mg Intravenous Q6HRS PRN Cornel  RAMON Little   1.25 mg at 08/14/19 0404   • hydrALAZINE (APRESOLINE) injection 20 mg  20 mg Intravenous Q6HRS PRN Cornel Little M.D.   20 mg at 08/14/19 0115       Fluids    Intake/Output Summary (Last 24 hours) at 8/14/2019 1117  Last data filed at 8/14/2019 1039  Gross per 24 hour   Intake 1371 ml   Output 1701 ml   Net -330 ml       Laboratory          Recent Labs     08/12/19 1424 08/13/19 0400 08/13/19  1542 08/14/19  0400   SODIUM 141  --  139 139 136   POTASSIUM 5.8*   < > 5.0 4.6 4.0   CHLORIDE 100  --  97 92* 93*   CO2 12*  --  21 22 18*   *  --  79* 57* 68*   CREATININE 25.62*  --  18.65* 12.86* 14.36*   MAGNESIUM 4.1*  --   --   --   --    CALCIUM 8.9  --  8.7 9.8 9.7    < > = values in this interval not displayed.     Recent Labs     08/12/19 1424 08/13/19 0400 08/13/19  1542 08/14/19  0400   ALTSGPT 11 12  --   --    ASTSGOT 12 19  --   --    ALKPHOSPHAT 79 75  --   --    TBILIRUBIN 1.7* 2.0*  --   --    GLUCOSE 76 77 103* 81     Recent Labs     08/12/19 1424 08/13/19 0400 08/14/19  0400   WBC 5.9 5.7 15.0*   NEUTSPOLYS 71.40 68.40  --    LYMPHOCYTES 15.90* 20.80*  --    MONOCYTES 9.30 9.70  --    EOSINOPHILS 1.90 0.20  --    BASOPHILS 0.50 0.50  --    ASTSGOT 12 19  --    ALTSGPT 11 12  --    ALKPHOSPHAT 79 75  --    TBILIRUBIN 1.7* 2.0*  --      Recent Labs     08/12/19 1424 08/13/19  0400 08/14/19  0400   RBC 3.24* 3.11* 4.31   HEMOGLOBIN 10.0* 9.7* 13.1   HEMATOCRIT 31.0* 29.3* 39.9   PLATELETCT 117* 126* 148*       Imaging  No additional imaging    Assessment/Plan  Avascular necrosis of bones of both hips (HCC)- (present on admission)  Assessment & Plan  On chronic narcotics at quite a high dose  Concern for withdrawal so have put on on 20 mg oxycodone q 6 (she was on q 4 at home)    ESRD (end stage renal disease) on dialysis (HCC)- (present on admission)  Assessment & Plan  From lupus nephritis  Dialysis now session 3 via fistula -- cramping during dialysis  "  Appreciate nephrology consult  ** holding plaquenil and sevalemer at this time till d/w nephrology re: restarting       Acute respiratory failure with hypoxia (HCC)- (present on admission)  Assessment & Plan  Improving with dialysis> 5 liters off no  No crackles  Weaning Fio2 down    Azotemia  Assessment & Plan  Also her \" twitches\" have resolved  BUN coming down nicely    Seizures (HCC)  Assessment & Plan  Exacerbated with the azotemia  No seizures now > 24 hrs  On Vimpat    Hypertension- (present on admission)  Assessment & Plan  Hypotension post dialysis but improving with blood return from dialysis  Holding parameters for amlodipine       VTE:  Heparin  Ulcer: H2 Antagonist  Lines: None patient with port    I have performed a physical exam and reviewed and updated ROS and Plan today (8/14/2019). In review of yesterday's note (8/13/2019), there are no changes except as documented above.     Stable to transfer to the floor  Discussed patient condition with RN and RT  "

## 2019-08-14 NOTE — PROGRESS NOTES
Pt now telemetry status, Pt on 5 L NC, 02 sat 96%, RR E/U. Pt VU of upcoming transfer to [facility].

## 2019-08-14 NOTE — FLOWSHEET NOTE
08/13/19 2241   Events/Summary/Plan   Events/Summary/Plan HHFNC   Heated Hi Flow Nasal Cannula   Heated Hi Flow Nasal Cannula (HHFNC) Yes   FiO2 (HHFNC) 40   Flowrate (HHFNC) 30   Humidifier Temperature (HHFNC) 31   Chest Exam   Respiration 15   Pulse (!) 105   Breath Sounds   RUL Breath Sounds Clear   RML Breath Sounds Diminished   RLL Breath Sounds Diminished   LASHANDA Breath Sounds Clear   LLL Breath Sounds Diminished   Oxygen   Pulse Oximetry 93 %

## 2019-08-14 NOTE — FLOWSHEET NOTE
08/14/19 0322   Events/Summary/Plan   Events/Summary/Plan HHFNC  (Patient being bathed at this time SpO2 monitored throughout)   Heated Hi Flow Nasal Cannula   Heated Hi Flow Nasal Cannula (HHFNC) Yes   FiO2 (HHFNC) 40   Flowrate (HHFNC) 30   Humidifier Temperature (HHFNC) 31   Chest Exam   Respiration (!) 33   Pulse (!) 123   Oxygen   Pulse Oximetry (!) 80 %   O2 (LPM) 30   FiO2% 40 %   O2 Daily Delivery Respiratory  Highflow Nasal Cannula

## 2019-08-14 NOTE — PROGRESS NOTES
Report received from NOC, Pt resting in bed awake and alert, POC reviewed. HD at bedside, treatment in progress. HR //72, tolerating HD at this time. Full nursing assessment completed, Pt on HF NC @ 30 LPM. 40% Fi02. Call light placed within reach.

## 2019-08-14 NOTE — FLOWSHEET NOTE
08/13/19 1825   Events/Summary/Plan   Events/Summary/Plan HHFNC   Heated Hi Flow Nasal Cannula   Heated Hi Flow Nasal Cannula (HHFNC) Yes   FiO2 (HHFNC) 40   Flowrate (HHFNC) 30   Humidifier Temperature (FNC) 31   Chest Exam   Respiration 17   Pulse 84   Breath Sounds   RUL Breath Sounds Clear   RML Breath Sounds Diminished   RLL Breath Sounds Diminished   LASHANDA Breath Sounds Diminished;Clear   LLL Breath Sounds Diminished   Oximetry   Continuous Oximetry Yes   Oxygen   Pulse Oximetry 99 %   O2 (LPM) 30   FiO2% 40 %

## 2019-08-14 NOTE — PROGRESS NOTES
HD completed, Pt de-accessed by MIGUEL RN. HR  &/64, Pt set up with breakfast, Pt lethargic and drowsy, heat pac applied to back for pain relief. Call light within reach.

## 2019-08-14 NOTE — DISCHARGE PLANNING
"LSW spoke with pt at bedside regarding missing dialysis. Pt stated she fell at home and that resulted in her missing dialysis. Pt stated she has a bad hip and needs a replacement. LSW asked pt if she called anyone and how long she was down. Pt stated things are still unclear and cant recall. LSW asked pt about previous missing dialysis sessions. Pt stated she tries to come to the hospital if she misses. LSW educated pt that hospital is not the appropriate place to come for regular dialysis sessions unless it is an emergency. Pt aware and stated \" I can't keep doing this.\"  LSW provided pt with information on [facility] and informed her that [facility] can take pt to dialysis, f/u appointments, her hip Dr and help with transport home upon d/c. Pt stated understanding.    "

## 2019-08-14 NOTE — PROGRESS NOTES
Pt transferred to Med/Tele room 309A by bed with all persona; belongings. Full report to Marta FINCH.

## 2019-08-14 NOTE — PROGRESS NOTES
Pt found sitting up in bed, dry heaving, threw up small mount (less than 25mL) around 0300. RN called & spoke with Dr. Albert regarding pt status. Updates given to MD, informed of elevated BPs & HR in the 120s-130s. Discussed pts narcotic use at home & order for PO 20mg Oxy IR for 0600 this morning. New order received - to give AM dose of Oxy IR now. Oxy IR given-see MAR. Dr. Albert rounding in the ICU around 0345. New orders received for AM labs. Discussed pts rhythm on the monitors. Pt has no c/o chest pain or pressure.  EKG ordered & done. EKG results reviewed by Dr. Albert. No new orders received at this time.

## 2019-08-14 NOTE — ASSESSMENT & PLAN NOTE
On chronic narcotics at quite a high dose  Concern for withdrawal so have put on on 20 mg oxycodone q 6 (she was on q 4 at home)

## 2019-08-14 NOTE — PROGRESS NOTES
Assumed care of pt w/ bedside report from JOSETTE Morrissey. Pt resting comfortably in bed, no complaints offered. Fall precautions/safety maintained. Hourly rounding. Oriented to room, call bell within reach. Will continue to monitor.

## 2019-08-14 NOTE — PROGRESS NOTES
RN called to room by CNA. Pt sitting on BSC, c/o dizziness. Denies straining to have BM prior to onset of symptoms. Assisted safely into bed. Hypotensive, BP 70s/30s-40s. Other VSS. Pt placed into Trendelenburg. ICU RN at bedside to assess. BP now 80s/40s. MD Walls made aware. Plan for IVF bolus. Pt updated, verbalized understanding. Will continue to monitor.

## 2019-08-14 NOTE — PROGRESS NOTES
Nephrology Daily Progress Note    Date of Service  8/14/2019    Chief Complaint  38 y.o. female with ESRD on HD MWF admitted 8/12/2019 with twitching, missed dialysis    Interval Problem Update  8/13 - Patient got HD last night with 2.7L UF and HD again this morning with 3L UF. Seen this afternoon, remains somewhat confused. Denies SOB. Denies chest pain.   8/14 -patient had dialysis #2 yesterday with 3 L removed.  Patient had dialysis this morning, complicated by hypotension, only able to remove 1 L.  Patient seen this afternoon, says she is feeling much better.  Denies chest pain, shortness of breath.    Review of Systems  Review of Systems   Constitutional: Negative for fever.   Respiratory: Negative for shortness of breath.    Cardiovascular: Negative for chest pain.   Gastrointestinal: Negative for abdominal pain.   All other systems reviewed and are negative.       Physical Exam  Temp:  [36.9 °C (98.4 °F)-37.7 °C (99.9 °F)] 36.9 °C (98.4 °F)  Pulse:  [] 113  Resp:  [7-35] 20  BP: ()/() 97/55  SpO2:  [80 %-99 %] 98 %    Physical Exam   Constitutional: She is oriented to person, place, and time. She appears well-developed. No distress.   Eyes: EOM are normal. No scleral icterus.   Neck: No tracheal deviation present.   Cardiovascular: Normal heart sounds.   No murmur heard.  Pulmonary/Chest: Effort normal. No stridor. She has no rales.   Abdominal: Soft. There is no tenderness.   Musculoskeletal: Normal range of motion. She exhibits edema (trace LE).   Neurological: She is alert and oriented to person, place, and time.   Skin: Skin is warm and dry.   Psychiatric: She has a normal mood and affect.   Access: Right upper extremity AV graft with a patent bruit and thrill      Fluids    Intake/Output Summary (Last 24 hours) at 8/14/2019 1655  Last data filed at 8/14/2019 1039  Gross per 24 hour   Intake 1127.52 ml   Output 1701 ml   Net -573.48 ml       Laboratory  Labs reviewed, pertinent labs  below.  Recent Labs     08/12/19  1424 08/13/19  0400 08/14/19  0400   WBC 5.9 5.7 15.0*   RBC 3.24* 3.11* 4.31   HEMOGLOBIN 10.0* 9.7* 13.1   HEMATOCRIT 31.0* 29.3* 39.9   MCV 95.7 94.2 92.6   MCH 30.9 31.2 30.4   MCHC 32.3* 33.1* 32.8*   RDW 43.1 42.8 41.2   PLATELETCT 117* 126* 148*   MPV 10.8 10.9 11.2     Recent Labs     08/13/19  0400 08/13/19  1542 08/14/19  0400   SODIUM 139 139 136   POTASSIUM 5.0 4.6 4.0   CHLORIDE 97 92* 93*   CO2 21 22 18*   GLUCOSE 77 103* 81   BUN 79* 57* 68*   CREATININE 18.65* 12.86* 14.36*   CALCIUM 8.7 9.8 9.7               URINALYSIS:  Lab Results   Component Value Date/Time    COLORURINE Yellow 08/12/2019 1459    CLARITY Clear 08/12/2019 1459    SPECGRAVITY 1.020 08/12/2019 1459    PHURINE 7.5 08/12/2019 1459    KETONES 15 (A) 08/12/2019 1459    PROTEINURIN >=300 (A) 08/12/2019 1459    BILIRUBINUR Negative 08/12/2019 1459    UROBILU 0.2 01/01/2019 0705    NITRITE Negative 08/12/2019 1459    LEUKESTERAS Negative 08/12/2019 1459    OCCULTBLOOD Small (A) 08/12/2019 1459     UPC  Lab Results   Component Value Date/Time    TOTPROTUR 65.0 (H) 01/21/2015 1520      Lab Results   Component Value Date/Time    CREATININEU 65.60 01/21/2015 1520         Imaging reviewed  CT-HEAD W/O   Final Result      1.  Head CT without contrast within normal limits. No evidence of acute cerebral infarction, hemorrhage or mass lesion.      DX-CHEST-PORTABLE (1 VIEW)   Final Result      1.  Interstitial edema.      2.  Cardiomegaly.            Current Facility-Administered Medications   Medication Dose Route Frequency Provider Last Rate Last Dose   • ondansetron (ZOFRAN) syringe/vial injection 4 mg  4 mg Intravenous Q4HRS AXEL Little M.D.   4 mg at 08/14/19 0823   • oxyCODONE immediate release (ROXICODONE) tablet 20 mg  20 mg Oral 4X/DAY Cornel Little M.D.   Stopped at 08/14/19 1700   • LORazepam (ATIVAN) injection 0.5 mg  0.5 mg Intravenous Q4HRS PRN Orlin Louis D.O.       •  amLODIPine (NORVASC) tablet 10 mg  10 mg Oral QDAY PRN Cornel Little M.D.   10 mg at 08/14/19 0126   • ascorbic acid tablet 500 mg  500 mg Oral DAILY Cornel Little M.D.   500 mg at 08/14/19 0551   • cholecalciferol (VITAMIN D3) tablet 400 Units  400 Units Oral DAILY Cornel Little M.D.   400 Units at 08/14/19 0551   • docusate sodium (COLACE) capsule 100 mg  100 mg Oral DAILY Cornel Little M.D.   100 mg at 08/14/19 0551   • ferrous sulfate tablet 325 mg  325 mg Oral DAILY Cornel Little M.D.   325 mg at 08/14/19 0551   • lacosamide (VIMPAT) tablet 100 mg  100 mg Oral BID Cornel Little M.D.   100 mg at 08/14/19 0552   • lidocaine (LIDODERM) 5 % 1 Patch  1 Patch Transdermal Q24HR Cornel Little M.D.   1 Patch at 08/14/19 0945   • omeprazole (PRILOSEC) capsule 20 mg  20 mg Oral DAILY Cornel Little M.D.   20 mg at 08/14/19 0551   • NS (BOLUS) infusion 250 mL  250 mL Intravenous DIALYSIS PRN Bora Douglas M.D.       • albumin human 25% solution 12.5 g  12.5 g Intravenous DIALYSIS PRN Bora Douglas M.D.       • lidocaine (XYLOCAINE) 1 % injection 1 mL  1 mL Intradermal PRN Bora Douglas M.D.       • epoetin kr (EPOGEN,PROCRIT) injection 2,000 Units  2,000 Units Subcutaneous MO, WE + FR Bora Douglas M.D.   Stopped at 08/14/19 0900    And   • epoetin rk (EPOGEN,PROCRIT) injection 2,000 Units  2,000 Units Subcutaneous MO, WE + FR Bora Douglas M.D.   Stopped at 08/14/19 0900   • NS (BOLUS) infusion 250 mL  250 mL Intravenous DIALYSIS PRN Bora Douglas M.D.       • albumin human 25% solution 12.5 g  12.5 g Intravenous DIALYSIS PRN Bora Douglas M.D.       • lidocaine (XYLOCAINE) 1 % injection 1 mL  1 mL Intradermal PRN Bora Douglas M.D.       • senna-docusate (PERICOLACE or SENOKOT S) 8.6-50 MG per tablet 2 Tab  2 Tab Oral BID Cornel Little M.D.   2 Tab at 08/14/19 0551    And   • polyethylene glycol/lytes (MIRALAX) PACKET 1 Packet  1 Packet Oral QDAY PRN Cornel  RAMON Little        And   • magnesium hydroxide (MILK OF MAGNESIA) suspension 30 mL  30 mL Oral QDAY PRCAMI Little M.D.        And   • bisacodyl (DULCOLAX) suppository 10 mg  10 mg Rectal QDAY PRN Cornel Little M.D.       • heparin injection 5,000 Units  5,000 Units Subcutaneous Q8HRS Cornel Little M.D.   5,000 Units at 08/14/19 1316   • enalaprilat (VASOTEC) injection 1.25 mg  1.25 mg Intravenous Q6HRS PRCAMI Little M.D.   1.25 mg at 08/14/19 0404   • hydrALAZINE (APRESOLINE) injection 20 mg  20 mg Intravenous Q6HRS PRCAMI Little M.D.   20 mg at 08/14/19 0115         Assessment/Plan  38 y.o. Female with ESRD on HD who presented 8/12/2019 with twitching, missed HD.      1. ESRD on HD MWF. Oliguric.  Continue dialysis as usually scheduled Monday Wednesday Friday.  Given hypotension, patient appears to have reached her dry weight.  Avoid nephrotoxins.  Check labs at least 3 times weekly.    2. Twitching, resolved.  Likely due to uremia.  Resolved with dialysis.     3. Accelerated HTN, resolved.  Likely due to volume overload due to missed dialysis.  We will continue ultrafiltration with dialysis as tolerated.     4. Anemia of CKD.  Labile.  Hemoglobin appears improved today.  Hold Epogen if hemoglobin is over 11.  Check CBC at least 3 times weekly.      5.  Leukocytosis, worsening.  Check CBC daily.           Bora Douglas MD  Nephrology

## 2019-08-14 NOTE — PROGRESS NOTES
Pt resting in bed; scheduled AM medications given-see MAR. Pt able to apply lotion to bilateral legs and place sock by herself - pt does desats to low 80s with activity. Once settled, after a minute - pts O2 saturation back up to high 90s. RN received a call from dialysis nurse regarding HD this morning, she will be in soon with pt for HD.

## 2019-08-14 NOTE — PROGRESS NOTES
Report received from Dianna RN, pt care assumed. Line verified. Pt is alert & oriented x3. Pt with high flow NC in place with 30L/40%. All needs currently met & safety precautions in place. Bed alarm in place. Will continue to monitor. Hourly rounds continue.

## 2019-08-14 NOTE — PROGRESS NOTES
RN received a call from Dr. Walls. MD given update on pt status & event over night with orders received from Dr. Albert. Discussed labs. No new orders received at this time.

## 2019-08-14 NOTE — PROGRESS NOTES
[facility] Services Progress Note    Hemodialysis treatment ordered today per Dr. Douglas x 3 hours. Treatment initiated at 0732, ended at 1008.     Unable to complete treatment r/t uncontrollable cramping to BLE during treatment. Barely improved with interventions (UF off; applied pressure; applied heat pack; NS bolus). Pt also had episode of hypotension SBPs in 70s. Pt restless during treament r/t pain and cramping. Terminated treatment 34mins early. Dr. Douglas notified ; see paper flow sheet for details.     Net UF 1001 mL.     Needles removed from access site. Dressings applied and sites held x 10 minutes; verified no bleeding. Positive bruit/thrill post tx. Staff RN instructed to monitor AVF for breakthrough bleeding. Should breakthrough bleeding occur staff RN instructed to apply pressure to access sites until bleeding resolved. Notify Dialysis and Nephrologist for follow-up.    Report given to Primary RN.

## 2019-08-14 NOTE — PROGRESS NOTES
EXAM DATE/TIME:  08/28/2017 03:28 

 

[facility] COMPARISON:     

CHEST SINGLE AP, August 27, 2017, 4:06.

 

                     

INDICATIONS :     

Shortness of breath, possible pulmonary disease.

                     

 

MEDICAL HISTORY :            

Endocarditis, pneumonia, poly substance abuse. renal failure, respiratory   

 

SURGICAL HISTORY :     

CABG.   

 

ENCOUNTER:     

Subsequent                                        

 

ACUITY:     

2 months      

 

PAIN SCORE:     

Non-responsive.

 

LOCATION:     

Bilateral chest 

 

FINDINGS:     

Portable AP view of the chest demonstrates a normal-sized cardiac silhouette in this patient post med
gita sternotomy. ETT, NG tube, and left subclavian central line remain present. Lungs are underinflate
d. No effusion, consolidation, or pneumothorax is visualized.

 

CONCLUSION:     

Stable chest x-ray with underinflation. No acute finding is identified.

 

 

 

 Ron Melgar MD on August 28, 2017 at 5:02           

Board Certified Radiologist.

 This report was verified electronically. Telemetry Shift Summary    Rhythm ST  HR Range 105-114  Ectopy NA  Measurements .14/.08/.32        Normal Values  Rhythm SR  HR Range    Measurements 0.12-0.20 / 0.06-0.10  / 0.30-0.52

## 2019-08-14 NOTE — CARE PLAN
Problem: Pain Management  Goal: Pain level will decrease to patient's comfort goal  Outcome: PROGRESSING AS EXPECTED  Flowsheets  Taken 8/14/2019 0400 by Kelli Redman RAnabelN.  Comfort Goal: Comfort at Rest;Sleep Comfortably  Pain Rating Scale (NPRS): 4  Taken 8/13/2019 1614 by Dianna Castanon RDAVID  CPOT Total Score: 4     Problem: Respiratory:  Goal: Respiratory status will improve  Outcome: PROGRESSING SLOWER THAN EXPECTED   Pt currently on high flow NC with setting at 30L/40%

## 2019-08-14 NOTE — PROGRESS NOTES
2 RN skin check complete  Device in place nasal cannula .  Skin assessed under devices intact .  Confirmed pressure ulcers found on NA .  New potential pressure ulcers found on blanchable redness to b/l buttocks, pt repositions self independently in bed . Wound consult placed NA .  The following interventions in place generalized scars, scabs, and bruises. Pt unsure of source, states possibly from her cat and had a fall at home. Repositions self independently in bed. Educated on frequent position changes with good understanding.

## 2019-08-15 ENCOUNTER — APPOINTMENT (OUTPATIENT)
Dept: CARDIOLOGY | Facility: MEDICAL CENTER | Age: 39
DRG: 189 | End: 2019-08-15
Attending: INTERNAL MEDICINE
Payer: MEDICARE

## 2019-08-15 LAB
ANION GAP SERPL CALC-SCNC: 24 MMOL/L (ref 0–11.9)
BACTERIA UR CULT: NORMAL
BASOPHILS # BLD AUTO: 0.5 % (ref 0–1.8)
BASOPHILS # BLD: 0.06 K/UL (ref 0–0.12)
BUN SERPL-MCNC: 65 MG/DL (ref 8–22)
CALCIUM SERPL-MCNC: 10.3 MG/DL (ref 8.4–10.2)
CHLORIDE SERPL-SCNC: 91 MMOL/L (ref 96–112)
CO2 SERPL-SCNC: 22 MMOL/L (ref 20–33)
CREAT SERPL-MCNC: 11.06 MG/DL (ref 0.5–1.4)
EOSINOPHIL # BLD AUTO: 0.17 K/UL (ref 0–0.51)
EOSINOPHIL NFR BLD: 1.5 % (ref 0–6.9)
ERYTHROCYTE [DISTWIDTH] IN BLOOD BY AUTOMATED COUNT: 42.4 FL (ref 35.9–50)
GLUCOSE SERPL-MCNC: 95 MG/DL (ref 65–99)
HCT VFR BLD AUTO: 40 % (ref 37–47)
HGB BLD-MCNC: 13 G/DL (ref 12–16)
IMM GRANULOCYTES # BLD AUTO: 0.06 K/UL (ref 0–0.11)
IMM GRANULOCYTES NFR BLD AUTO: 0.5 % (ref 0–0.9)
LV EJECT FRACT  99904: 55
LV EJECT FRACT MOD 2C 99903: 54.87
LV EJECT FRACT MOD 4C 99902: 57.42
LV EJECT FRACT MOD BP 99901: 56.17
LYMPHOCYTES # BLD AUTO: 1.12 K/UL (ref 1–4.8)
LYMPHOCYTES NFR BLD: 10.1 % (ref 22–41)
MCH RBC QN AUTO: 30.5 PG (ref 27–33)
MCHC RBC AUTO-ENTMCNC: 32.5 G/DL (ref 33.6–35)
MCV RBC AUTO: 93.9 FL (ref 81.4–97.8)
MONOCYTES # BLD AUTO: 0.98 K/UL (ref 0–0.85)
MONOCYTES NFR BLD AUTO: 8.9 % (ref 0–13.4)
NEUTROPHILS # BLD AUTO: 8.65 K/UL (ref 2–7.15)
NEUTROPHILS NFR BLD: 78.5 % (ref 44–72)
NRBC # BLD AUTO: 0 K/UL
NRBC BLD-RTO: 0 /100 WBC
PLATELET # BLD AUTO: 134 K/UL (ref 164–446)
PMV BLD AUTO: 11.4 FL (ref 9–12.9)
POTASSIUM SERPL-SCNC: 4.8 MMOL/L (ref 3.6–5.5)
RBC # BLD AUTO: 4.26 M/UL (ref 4.2–5.4)
SIGNIFICANT IND 70042: NORMAL
SITE SITE: NORMAL
SODIUM SERPL-SCNC: 137 MMOL/L (ref 135–145)
SOURCE SOURCE: NORMAL
WBC # BLD AUTO: 11 K/UL (ref 4.8–10.8)

## 2019-08-15 PROCEDURE — 80048 BASIC METABOLIC PNL TOTAL CA: CPT

## 2019-08-15 PROCEDURE — A9270 NON-COVERED ITEM OR SERVICE: HCPCS | Performed by: INTERNAL MEDICINE

## 2019-08-15 PROCEDURE — 99232 SBSQ HOSP IP/OBS MODERATE 35: CPT | Performed by: INTERNAL MEDICINE

## 2019-08-15 PROCEDURE — 770020 HCHG ROOM/CARE - TELE (206)

## 2019-08-15 PROCEDURE — 700102 HCHG RX REV CODE 250 W/ 637 OVERRIDE(OP): Performed by: INTERNAL MEDICINE

## 2019-08-15 PROCEDURE — 87040 BLOOD CULTURE FOR BACTERIA: CPT

## 2019-08-15 PROCEDURE — 85025 COMPLETE CBC W/AUTO DIFF WBC: CPT

## 2019-08-15 PROCEDURE — 99233 SBSQ HOSP IP/OBS HIGH 50: CPT | Performed by: INTERNAL MEDICINE

## 2019-08-15 PROCEDURE — 700101 HCHG RX REV CODE 250: Performed by: INTERNAL MEDICINE

## 2019-08-15 PROCEDURE — 93306 TTE W/DOPPLER COMPLETE: CPT

## 2019-08-15 PROCEDURE — 93306 TTE W/DOPPLER COMPLETE: CPT | Mod: 26 | Performed by: INTERNAL MEDICINE

## 2019-08-15 PROCEDURE — 700111 HCHG RX REV CODE 636 W/ 250 OVERRIDE (IP): Performed by: INTERNAL MEDICINE

## 2019-08-15 RX ORDER — TRAZODONE HYDROCHLORIDE 50 MG/1
50 TABLET ORAL
Status: DISCONTINUED | OUTPATIENT
Start: 2019-08-15 | End: 2019-08-21 | Stop reason: HOSPADM

## 2019-08-15 RX ORDER — CALCIUM CARBONATE 500 MG/1
500 TABLET, CHEWABLE ORAL EVERY 8 HOURS PRN
Status: DISCONTINUED | OUTPATIENT
Start: 2019-08-15 | End: 2019-08-21 | Stop reason: HOSPADM

## 2019-08-15 RX ORDER — HEPARIN SODIUM 5000 [USP'U]/ML
5000 INJECTION, SOLUTION INTRAVENOUS; SUBCUTANEOUS EVERY 12 HOURS
Status: DISCONTINUED | OUTPATIENT
Start: 2019-08-16 | End: 2019-08-21 | Stop reason: HOSPADM

## 2019-08-15 RX ADMIN — OMEPRAZOLE 20 MG: 20 CAPSULE, DELAYED RELEASE ORAL at 05:13

## 2019-08-15 RX ADMIN — CALCIUM CARBONATE (ANTACID) CHEW TAB 500 MG 500 MG: 500 CHEW TAB at 17:26

## 2019-08-15 RX ADMIN — OXYCODONE HYDROCHLORIDE 20 MG: 10 TABLET ORAL at 20:49

## 2019-08-15 RX ADMIN — LACOSAMIDE 100 MG: 50 TABLET, FILM COATED ORAL at 05:13

## 2019-08-15 RX ADMIN — TRAZODONE HYDROCHLORIDE 50 MG: 50 TABLET ORAL at 22:44

## 2019-08-15 RX ADMIN — OXYCODONE HYDROCHLORIDE 20 MG: 10 TABLET ORAL at 08:51

## 2019-08-15 RX ADMIN — CHOLECALCIFEROL (VITAMIN D3) 10 MCG (400 UNIT) TABLET 400 UNITS: at 05:13

## 2019-08-15 RX ADMIN — LIDOCAINE 1 PATCH: 50 PATCH TOPICAL at 08:52

## 2019-08-15 RX ADMIN — FERROUS SULFATE TAB 325 MG (65 MG ELEMENTAL FE) 325 MG: 325 (65 FE) TAB at 05:13

## 2019-08-15 RX ADMIN — ONDANSETRON HYDROCHLORIDE 4 MG: 2 INJECTION, SOLUTION INTRAMUSCULAR; INTRAVENOUS at 01:12

## 2019-08-15 RX ADMIN — LACOSAMIDE 100 MG: 50 TABLET, FILM COATED ORAL at 17:24

## 2019-08-15 RX ADMIN — ONDANSETRON HYDROCHLORIDE 4 MG: 2 INJECTION, SOLUTION INTRAMUSCULAR; INTRAVENOUS at 20:50

## 2019-08-15 RX ADMIN — ONDANSETRON HYDROCHLORIDE 4 MG: 2 INJECTION, SOLUTION INTRAMUSCULAR; INTRAVENOUS at 16:03

## 2019-08-15 RX ADMIN — OXYCODONE HYDROCHLORIDE 20 MG: 10 TABLET ORAL at 16:02

## 2019-08-15 RX ADMIN — HEPARIN SODIUM 5000 UNITS: 5000 INJECTION, SOLUTION INTRAVENOUS; SUBCUTANEOUS at 13:30

## 2019-08-15 RX ADMIN — OXYCODONE HYDROCHLORIDE AND ACETAMINOPHEN 500 MG: 500 TABLET ORAL at 05:13

## 2019-08-15 RX ADMIN — HEPARIN SODIUM 5000 UNITS: 5000 INJECTION, SOLUTION INTRAVENOUS; SUBCUTANEOUS at 05:14

## 2019-08-15 ASSESSMENT — ENCOUNTER SYMPTOMS
FEVER: 0
PALPITATIONS: 0
CHILLS: 0
FOCAL WEAKNESS: 0
MYALGIAS: 1
ABDOMINAL PAIN: 0
CONSTIPATION: 0
DIARRHEA: 0
VOMITING: 0
NAUSEA: 0
SHORTNESS OF BREATH: 0

## 2019-08-15 NOTE — PROGRESS NOTES
BP 90s/50s, symptoms improved. Instructed pt to notify RN of changes, verbalized understanding. Will continue to monitor.

## 2019-08-15 NOTE — PROGRESS NOTES
Nephrology Daily Progress Note    Date of Service  8/15/2019    Chief Complaint  38 y.o. female with ESRD on HD MWF admitted 8/12/2019 with twitching, missed dialysis    Interval Problem Update  8/13 - Patient got HD last night with 2.7L UF and HD again this morning with 3L UF. Seen this afternoon, remains somewhat confused. Denies SOB. Denies chest pain.   8/14 -patient had dialysis #2 yesterday with 3 L removed.  Patient had dialysis this morning, complicated by hypotension, only able to remove 1 L.  Patient seen this afternoon, says she is feeling much better.  Denies chest pain, shortness of breath.  8/15 -no new complaints today.  Patient denies shortness of breath, chest pain.    Review of Systems  Review of Systems   Constitutional: Negative for fever.   Respiratory: Negative for shortness of breath.    Cardiovascular: Negative for chest pain.   Gastrointestinal: Negative for abdominal pain.   All other systems reviewed and are negative.       Physical Exam  Temp:  [36.3 °C (97.4 °F)-36.9 °C (98.4 °F)] 36.3 °C (97.4 °F)  Pulse:  [] 94  Resp:  [16-20] 18  BP: ()/(50-74) 101/55  SpO2:  [91 %-97 %] 91 %    Physical Exam   Constitutional: She is oriented to person, place, and time. She appears well-developed. No distress.   Eyes: EOM are normal. No scleral icterus.   Neck: No tracheal deviation present.   Cardiovascular: Normal heart sounds.   No murmur heard.  Pulmonary/Chest: Effort normal. No stridor. She has no rales.   Abdominal: Soft. There is no tenderness.   Musculoskeletal: Normal range of motion. She exhibits no edema.   Neurological: She is alert and oriented to person, place, and time.   Skin: Skin is warm and dry.   Psychiatric: She has a normal mood and affect.   Access: Right upper extremity AV graft with a patent bruit and thrill      Fluids    Intake/Output Summary (Last 24 hours) at 8/15/2019 1635  Last data filed at 8/15/2019 1600  Gross per 24 hour   Intake 840 ml   Output --   Net  840 ml       Laboratory  Labs reviewed, pertinent labs below.  Recent Labs     08/13/19  0400 08/14/19  0400 08/15/19  0315   WBC 5.7 15.0* 11.0*   RBC 3.11* 4.31 4.26   HEMOGLOBIN 9.7* 13.1 13.0   HEMATOCRIT 29.3* 39.9 40.0   MCV 94.2 92.6 93.9   MCH 31.2 30.4 30.5   MCHC 33.1* 32.8* 32.5*   RDW 42.8 41.2 42.4   PLATELETCT 126* 148* 134*   MPV 10.9 11.2 11.4     Recent Labs     08/13/19  1542 08/14/19  0400 08/15/19  0315   SODIUM 139 136 137   POTASSIUM 4.6 4.0 4.8   CHLORIDE 92* 93* 91*   CO2 22 18* 22   GLUCOSE 103* 81 95   BUN 57* 68* 65*   CREATININE 12.86* 14.36* 11.06*   CALCIUM 9.8 9.7 10.3*               URINALYSIS:  Lab Results   Component Value Date/Time    COLORURINE Yellow 08/12/2019 1459    CLARITY Clear 08/12/2019 1459    SPECGRAVITY 1.020 08/12/2019 1459    PHURINE 7.5 08/12/2019 1459    KETONES 15 (A) 08/12/2019 1459    PROTEINURIN >=300 (A) 08/12/2019 1459    BILIRUBINUR Negative 08/12/2019 1459    UROBILU 0.2 01/01/2019 0705    NITRITE Negative 08/12/2019 1459    LEUKESTERAS Negative 08/12/2019 1459    OCCULTBLOOD Small (A) 08/12/2019 1459     UPC  Lab Results   Component Value Date/Time    TOTPROTUR 65.0 (H) 01/21/2015 1520      Lab Results   Component Value Date/Time    CREATININEU 65.60 01/21/2015 1520         Imaging reviewed  CT-HEAD W/O   Final Result      1.  Head CT without contrast within normal limits. No evidence of acute cerebral infarction, hemorrhage or mass lesion.      DX-CHEST-PORTABLE (1 VIEW)   Final Result      1.  Interstitial edema.      2.  Cardiomegaly.      EC-ECHOCARDIOGRAM COMPLETE W/O CONT    (Results Pending)         Current Facility-Administered Medications   Medication Dose Route Frequency Provider Last Rate Last Dose   • ondansetron (ZOFRAN) syringe/vial injection 4 mg  4 mg Intravenous Q4HRS PRN Cornel Little M.D.   4 mg at 08/15/19 1603   • oxyCODONE immediate release (ROXICODONE) tablet 20 mg  20 mg Oral 4X/DAY Cornel Little M.D.   20 mg at  08/15/19 1602   • LORazepam (ATIVAN) injection 0.5 mg  0.5 mg Intravenous Q4HRS PRN Orlin Louis D.O.       • amLODIPine (NORVASC) tablet 10 mg  10 mg Oral QDAY PRN Cornel Little M.D.   10 mg at 08/14/19 0126   • ascorbic acid tablet 500 mg  500 mg Oral DAILY Cornel Little M.D.   500 mg at 08/15/19 0513   • cholecalciferol (VITAMIN D3) tablet 400 Units  400 Units Oral DAILY Cornel Little M.D.   400 Units at 08/15/19 0513   • docusate sodium (COLACE) capsule 100 mg  100 mg Oral DAILY Cornel Little M.D.   100 mg at 08/14/19 0551   • ferrous sulfate tablet 325 mg  325 mg Oral DAILY Cornel Little M.D.   325 mg at 08/15/19 0513   • lacosamide (VIMPAT) tablet 100 mg  100 mg Oral BID Cornel Little M.D.   100 mg at 08/15/19 0513   • lidocaine (LIDODERM) 5 % 1 Patch  1 Patch Transdermal Q24HR Cornel Little M.D.   1 Patch at 08/15/19 0852   • omeprazole (PRILOSEC) capsule 20 mg  20 mg Oral DAILY Cornel Little M.D.   20 mg at 08/15/19 0513   • NS (BOLUS) infusion 250 mL  250 mL Intravenous DIALYSIS PRN Bora Douglas M.D.       • albumin human 25% solution 12.5 g  12.5 g Intravenous DIALYSIS PRN Bora Douglas M.D.       • lidocaine (XYLOCAINE) 1 % injection 1 mL  1 mL Intradermal PRN Bora Douglas M.D.       • epoetin rk (EPOGEN,PROCRIT) injection 2,000 Units  2,000 Units Subcutaneous MO, WE + FR Bora Douglas M.D.   Stopped at 08/14/19 0900    And   • epoetin rk (EPOGEN,PROCRIT) injection 2,000 Units  2,000 Units Subcutaneous MO, WE + FR Bora Douglas M.D.   Stopped at 08/14/19 0900   • NS (BOLUS) infusion 250 mL  250 mL Intravenous DIALYSIS PRN Bora Douglas M.D.       • albumin human 25% solution 12.5 g  12.5 g Intravenous DIALYSIS PRN Bora Douglas M.D.       • lidocaine (XYLOCAINE) 1 % injection 1 mL  1 mL Intradermal PRN Bora Douglas M.D.       • senna-docusate (PERICOLACE or SENOKOT S) 8.6-50 MG per tablet 2 Tab  2 Tab Oral BID Cornel Little M.D.   2 Tab at  08/14/19 1717    And   • polyethylene glycol/lytes (MIRALAX) PACKET 1 Packet  1 Packet Oral QDAY PRCAMI Little M.D.        And   • magnesium hydroxide (MILK OF MAGNESIA) suspension 30 mL  30 mL Oral QDAY AXEL Little M.D.        And   • bisacodyl (DULCOLAX) suppository 10 mg  10 mg Rectal QDAY AXEL Little M.D.       • heparin injection 5,000 Units  5,000 Units Subcutaneous Q8HRS Cornel Little M.D.   5,000 Units at 08/15/19 1330   • enalaprilat (VASOTEC) injection 1.25 mg  1.25 mg Intravenous Q6HRS AXEL Little M.D.   1.25 mg at 08/14/19 0404   • hydrALAZINE (APRESOLINE) injection 20 mg  20 mg Intravenous Q6HRS AXEL Little M.D.   20 mg at 08/14/19 0115         Assessment/Plan  38 y.o. Female with ESRD on HD who presented 8/12/2019 with twitching, missed HD.      1. ESRD on HD MWF. Oliguric.  Stable.  Continue dialysis on usual Monday Wednesday Friday schedule.  Ultrafiltration as tolerated by blood pressure.  Avoid nephrotoxins.  Check labs at least 3 times weekly.    2. Twitching, resolved.  Was likely due to uremia.      3. Accelerated HTN, resolved.  Was likely due to volume overload from missed dialysis.  Continue ultrafiltration with dialysis as tolerated.      4. Anemia of CKD.  Controlled.  Hold Epogen with hemoglobin over 11.  Check CBC at least 3 times weekly.    5.  Leukocytosis, improving.  Check CBC daily.    Discussed with Dr. Reese Douglas MD  Nephrology

## 2019-08-15 NOTE — CARE PLAN
Problem: Safety  Goal: Will remain free from falls  Outcome: PROGRESSING AS EXPECTED  Intervention: Implement fall precautions  Flowsheets  Taken 8/15/2019 0804 by Marta Jeff RAnabelN.  Bed Alarm: Yes - Alarm On  Chair/Bed Strip Alarm: Yes - Alarm On  Taken 8/14/2019 1945 by David Gonzales R.N.  Environmental Precautions: Treaded Slipper Socks on Patient;Personal Belongings, Wastebasket, Call Bell etc. in Easy Reach;Transferred to Stronger Side;Report Given to Other Health Care Providers Regarding Fall Risk;Bed in Low Position;Mobility Assessed & Appropriate Sign Placed;Communication Sign for Patients & Families  Note:   BP improved. Bed in lowest position, wheels locked. Call bell within reach, calls appropriately for assistance. Fall prevention education provided, verbalized understanding. Hourly rounding. Fall/safety precautions in place. Pt remains free from fall/injury at this time.       Problem: Venous Thromboembolism (VTW)/Deep Vein Thrombosis (DVT) Prevention:  Goal: Patient will participate in Venous Thrombosis (VTE)/Deep Vein Thrombosis (DVT)Prevention Measures  Outcome: PROGRESSING AS EXPECTED  Intervention: Assess and monitor for anticoagulation complications  Note:   Heparin as ordered, b/l SCDs in place. Educated on importance of prevention with good understanding. Pt remains free s/s VTE/DVT

## 2019-08-15 NOTE — PROGRESS NOTES
Assumed care of pt w/ bedside report from JOSETTE Hernandez. Pt resting comfortably in bed, no complaints offered. Fall precautions/safety maintained. Hourly rounding. Will continue to monitor.

## 2019-08-15 NOTE — PROGRESS NOTES
Telemetry Shift Summary    Rhythm SR, ST  HR Range 80-100s  Ectopy RPVC  Measurements 0.14/0.08/0.36        Normal Values  Rhythm SR  HR Range    Measurements 0.12-0.20 / 0.06-0.10  / 0.30-0.52

## 2019-08-15 NOTE — PROGRESS NOTES
Telemetry Shift Summary    Rhythm NSR  HR Range 80s-90s  Ectopy R- PVCs  Measurements .16/.08/.36        Normal Values  Rhythm SR  HR Range    Measurements 0.12-0.20 / 0.06-0.10  / 0.30-0.52

## 2019-08-15 NOTE — CARE PLAN
Problem: Safety  Goal: Will remain free from injury  Description  SZ precautions implemented.    Outcome: PROGRESSING AS EXPECTED  Note:   Keep call light within reach. Ensure environment is clutter free. Have patient wear treaded socks.      Problem: Pain Management  Goal: Pain level will decrease to patient's comfort goal  Outcome: PROGRESSING AS EXPECTED  Flowsheets (Taken 8/14/2019 1945)  Comfort Goal: Comfort at Rest  Pain Rating Scale (NPRS): 5  Note:   Identify source of pain. Medicate as appropriate. Provide nonpharmcological measures.

## 2019-08-15 NOTE — PROGRESS NOTES
Bedside report given to JOSETTE Hernandez. POC discussed, all questions answered. Safety precautions in place. Care released.

## 2019-08-16 ENCOUNTER — APPOINTMENT (OUTPATIENT)
Dept: CARDIOLOGY | Facility: MEDICAL CENTER | Age: 39
DRG: 189 | End: 2019-08-16
Attending: INTERNAL MEDICINE
Payer: MEDICARE

## 2019-08-16 LAB
ANION GAP SERPL CALC-SCNC: 22 MMOL/L (ref 0–11.9)
BASOPHILS # BLD AUTO: 0.4 % (ref 0–1.8)
BASOPHILS # BLD: 0.04 K/UL (ref 0–0.12)
BUN SERPL-MCNC: 87 MG/DL (ref 8–22)
CALCIUM SERPL-MCNC: 9.5 MG/DL (ref 8.4–10.2)
CHLORIDE SERPL-SCNC: 89 MMOL/L (ref 96–112)
CO2 SERPL-SCNC: 23 MMOL/L (ref 20–33)
CREAT SERPL-MCNC: 13.45 MG/DL (ref 0.5–1.4)
EOSINOPHIL # BLD AUTO: 0.34 K/UL (ref 0–0.51)
EOSINOPHIL NFR BLD: 3.8 % (ref 0–6.9)
ERYTHROCYTE [DISTWIDTH] IN BLOOD BY AUTOMATED COUNT: 42.6 FL (ref 35.9–50)
GLUCOSE SERPL-MCNC: 100 MG/DL (ref 65–99)
HCT VFR BLD AUTO: 36.3 % (ref 37–47)
HGB BLD-MCNC: 11.7 G/DL (ref 12–16)
IMM GRANULOCYTES # BLD AUTO: 0.06 K/UL (ref 0–0.11)
IMM GRANULOCYTES NFR BLD AUTO: 0.7 % (ref 0–0.9)
LYMPHOCYTES # BLD AUTO: 0.93 K/UL (ref 1–4.8)
LYMPHOCYTES NFR BLD: 10.4 % (ref 22–41)
MCH RBC QN AUTO: 30.5 PG (ref 27–33)
MCHC RBC AUTO-ENTMCNC: 32.2 G/DL (ref 33.6–35)
MCV RBC AUTO: 94.5 FL (ref 81.4–97.8)
MONOCYTES # BLD AUTO: 1 K/UL (ref 0–0.85)
MONOCYTES NFR BLD AUTO: 11.2 % (ref 0–13.4)
NEUTROPHILS # BLD AUTO: 6.57 K/UL (ref 2–7.15)
NEUTROPHILS NFR BLD: 73.5 % (ref 44–72)
NRBC # BLD AUTO: 0 K/UL
NRBC BLD-RTO: 0 /100 WBC
PHOSPHATE SERPL-MCNC: 11.5 MG/DL (ref 2.5–4.5)
PLATELET # BLD AUTO: 151 K/UL (ref 164–446)
PMV BLD AUTO: 11.6 FL (ref 9–12.9)
POTASSIUM SERPL-SCNC: 4.7 MMOL/L (ref 3.6–5.5)
RBC # BLD AUTO: 3.84 M/UL (ref 4.2–5.4)
SODIUM SERPL-SCNC: 134 MMOL/L (ref 135–145)
WBC # BLD AUTO: 8.9 K/UL (ref 4.8–10.8)

## 2019-08-16 PROCEDURE — 85025 COMPLETE CBC W/AUTO DIFF WBC: CPT

## 2019-08-16 PROCEDURE — 80048 BASIC METABOLIC PNL TOTAL CA: CPT

## 2019-08-16 PROCEDURE — 700102 HCHG RX REV CODE 250 W/ 637 OVERRIDE(OP): Performed by: INTERNAL MEDICINE

## 2019-08-16 PROCEDURE — A9270 NON-COVERED ITEM OR SERVICE: HCPCS | Performed by: INTERNAL MEDICINE

## 2019-08-16 PROCEDURE — 87040 BLOOD CULTURE FOR BACTERIA: CPT | Mod: 91

## 2019-08-16 PROCEDURE — 99233 SBSQ HOSP IP/OBS HIGH 50: CPT | Performed by: INTERNAL MEDICINE

## 2019-08-16 PROCEDURE — 700101 HCHG RX REV CODE 250: Performed by: INTERNAL MEDICINE

## 2019-08-16 PROCEDURE — P9047 ALBUMIN (HUMAN), 25%, 50ML: HCPCS | Performed by: INTERNAL MEDICINE

## 2019-08-16 PROCEDURE — 84100 ASSAY OF PHOSPHORUS: CPT

## 2019-08-16 PROCEDURE — 93325 DOPPLER ECHO COLOR FLOW MAPG: CPT

## 2019-08-16 PROCEDURE — A9270 NON-COVERED ITEM OR SERVICE: HCPCS | Performed by: HOSPITALIST

## 2019-08-16 PROCEDURE — 770020 HCHG ROOM/CARE - TELE (206)

## 2019-08-16 PROCEDURE — 90935 HEMODIALYSIS ONE EVALUATION: CPT

## 2019-08-16 PROCEDURE — 90935 HEMODIALYSIS ONE EVALUATION: CPT | Performed by: INTERNAL MEDICINE

## 2019-08-16 PROCEDURE — 700111 HCHG RX REV CODE 636 W/ 250 OVERRIDE (IP): Performed by: INTERNAL MEDICINE

## 2019-08-16 PROCEDURE — 700102 HCHG RX REV CODE 250 W/ 637 OVERRIDE(OP): Performed by: HOSPITALIST

## 2019-08-16 PROCEDURE — 93308 TTE F-UP OR LMTD: CPT | Mod: 26 | Performed by: INTERNAL MEDICINE

## 2019-08-16 PROCEDURE — 87150 DNA/RNA AMPLIFIED PROBE: CPT

## 2019-08-16 PROCEDURE — 87077 CULTURE AEROBIC IDENTIFY: CPT

## 2019-08-16 RX ORDER — SEVELAMER CARBONATE 800 MG/1
800 TABLET, FILM COATED ORAL
Status: DISCONTINUED | OUTPATIENT
Start: 2019-08-16 | End: 2019-08-18

## 2019-08-16 RX ORDER — DIPHENHYDRAMINE HCL 25 MG
25 TABLET ORAL ONCE
Status: COMPLETED | OUTPATIENT
Start: 2019-08-16 | End: 2019-08-16

## 2019-08-16 RX ADMIN — EPOETIN ALFA 2000 UNITS: 2000 SOLUTION INTRAVENOUS; SUBCUTANEOUS at 14:53

## 2019-08-16 RX ADMIN — ALBUMIN (HUMAN) 12.5 G: 5 SOLUTION INTRAVENOUS at 15:51

## 2019-08-16 RX ADMIN — LACOSAMIDE 100 MG: 50 TABLET, FILM COATED ORAL at 17:59

## 2019-08-16 RX ADMIN — TRAZODONE HYDROCHLORIDE 50 MG: 50 TABLET ORAL at 23:03

## 2019-08-16 RX ADMIN — LACOSAMIDE 100 MG: 50 TABLET, FILM COATED ORAL at 06:02

## 2019-08-16 RX ADMIN — OXYCODONE HYDROCHLORIDE AND ACETAMINOPHEN 500 MG: 500 TABLET ORAL at 06:02

## 2019-08-16 RX ADMIN — HEPARIN SODIUM 5000 UNITS: 5000 INJECTION, SOLUTION INTRAVENOUS; SUBCUTANEOUS at 17:59

## 2019-08-16 RX ADMIN — SEVELAMER CARBONATE 800 MG: 800 TABLET, FILM COATED ORAL at 11:21

## 2019-08-16 RX ADMIN — FERROUS SULFATE TAB 325 MG (65 MG ELEMENTAL FE) 325 MG: 325 (65 FE) TAB at 06:02

## 2019-08-16 RX ADMIN — LIDOCAINE 1 PATCH: 50 PATCH TOPICAL at 08:44

## 2019-08-16 RX ADMIN — SEVELAMER CARBONATE 800 MG: 800 TABLET, FILM COATED ORAL at 18:00

## 2019-08-16 RX ADMIN — OXYCODONE HYDROCHLORIDE 20 MG: 10 TABLET ORAL at 18:00

## 2019-08-16 RX ADMIN — ALBUMIN (HUMAN) 12.5 G: 5 SOLUTION INTRAVENOUS at 14:52

## 2019-08-16 RX ADMIN — CHOLECALCIFEROL (VITAMIN D3) 10 MCG (400 UNIT) TABLET 400 UNITS: at 06:02

## 2019-08-16 RX ADMIN — HEPARIN SODIUM 5000 UNITS: 5000 INJECTION, SOLUTION INTRAVENOUS; SUBCUTANEOUS at 06:02

## 2019-08-16 RX ADMIN — OMEPRAZOLE 20 MG: 20 CAPSULE, DELAYED RELEASE ORAL at 06:02

## 2019-08-16 RX ADMIN — OXYCODONE HYDROCHLORIDE 20 MG: 10 TABLET ORAL at 12:20

## 2019-08-16 RX ADMIN — OXYCODONE HYDROCHLORIDE 20 MG: 10 TABLET ORAL at 08:44

## 2019-08-16 RX ADMIN — DIPHENHYDRAMINE HCL 25 MG: 25 TABLET ORAL at 23:03

## 2019-08-16 ASSESSMENT — COGNITIVE AND FUNCTIONAL STATUS - GENERAL
STANDING UP FROM CHAIR USING ARMS: A LITTLE
SUGGESTED CMS G CODE MODIFIER MOBILITY: CJ
WALKING IN HOSPITAL ROOM: A LOT
TOILETING: A LITTLE
CLIMB 3 TO 5 STEPS WITH RAILING: A LITTLE
HELP NEEDED FOR BATHING: A LITTLE
DAILY ACTIVITIY SCORE: 21
SUGGESTED CMS G CODE MODIFIER DAILY ACTIVITY: CJ
MOBILITY SCORE: 20
DRESSING REGULAR LOWER BODY CLOTHING: A LITTLE

## 2019-08-16 ASSESSMENT — ENCOUNTER SYMPTOMS
CHILLS: 0
PALPITATIONS: 0
ABDOMINAL PAIN: 0
VOMITING: 0
BACK PAIN: 0
SHORTNESS OF BREATH: 0
FOCAL WEAKNESS: 0
CONSTIPATION: 0
FEVER: 0
DIARRHEA: 0
NAUSEA: 0

## 2019-08-16 NOTE — PROGRESS NOTES
Dressing on chest port coming off. Dressing changed per protocol. New dressing clean, dry and intact. Will continue to monitor.

## 2019-08-16 NOTE — PROGRESS NOTES
Telemetry Shift Summary    Rhythm SR, ST  HR Range 70-100s  Ectopy RPVC  Measurements 0.14/0.08/0.38        Normal Values  Rhythm SR  HR Range    Measurements 0.12-0.20 / 0.06-0.10  / 0.30-0.52

## 2019-08-16 NOTE — CARE PLAN
Problem: Communication  Goal: The ability to communicate needs accurately and effectively will improve  Outcome: PROGRESSING AS EXPECTED  Note:   A&Ox4, able to make needs known.      Problem: Safety  Goal: Will remain free from injury  Description  SZ precautions implemented.    Outcome: PROGRESSING AS EXPECTED  Note:   Up with 1-2 assist and walker, calling staff approprietly for assistance. Seizure precautions in place.      Problem: Venous Thromboembolism (VTW)/Deep Vein Thrombosis (DVT) Prevention:  Goal: Patient will participate in Venous Thrombosis (VTE)/Deep Vein Thrombosis (DVT)Prevention Measures  Outcome: PROGRESSING AS EXPECTED  Note:   SCD's on while in bed.      Problem: Bowel/Gastric:  Goal: Normal bowel function is maintained or improved  Outcome: PROGRESSING AS EXPECTED  Note:   LBM: 8/14, no n/v. Pt states this is the first day without a stomach ache. Renal diet.      Problem: Respiratory:  Goal: Respiratory status will improve  Outcome: PROGRESSING AS EXPECTED  Note:   Attempt to wean O2. Pt 81-85% on room air. Pt still requiring 5 L to stay >92%.      Problem: Urinary Elimination:  Goal: Ability to reestablish a normal urinary elimination pattern will improve  Outcome: PROGRESSING AS EXPECTED  Note:   Anuric due to dialysis. Anticipating dialysis today.       Problem: Skin Integrity  Goal: Risk for impaired skin integrity will decrease  Outcome: PROGRESSING AS EXPECTED  Intervention: Assess risk factors for impaired skin integrity and/or pressure ulcers  Note:   R fistula WNL. L chest port, saline locked.

## 2019-08-16 NOTE — PROGRESS NOTES
HOSPITAL MEDICINE PROGRESS NOTE    Date of service  8/15/2019    Chief Complaint  Seizure (not been taking her medication)      Hospital Course:    38 y.o. 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  Found to have severe azotemia with BUN > 100, k 5.8, CXR c/w pulm edema  No obvious seizures. Ct heard negative  She is in overt resp distress on 10 l oxygen/min saturating at 84%  Improved with starting BIPAP  Admitted to ICU for emergent dialysis on 8/12/19.  Transferred out of ICU 08/15/19          Interval History Updates:  Hospital Day #Hospital Day: 4  No event overnight.  Afebrile.    Echo done this morning (see below)    Consultants/Specialty  Nephrology  Critical Care    Review of Systems   Review of Systems   Constitutional: Negative for chills and fever.   Respiratory: Negative for shortness of breath.    Cardiovascular: Negative for chest pain, palpitations and leg swelling.   Gastrointestinal: Negative for abdominal pain, constipation, diarrhea, nausea and vomiting.   Musculoskeletal: Positive for joint pain and myalgias.   Skin: Negative for rash.   Neurological: Negative for focal weakness.   Endo/Heme/Allergies: Negative.    All other systems reviewed and are negative.       Medications:  • calcium carbonate  500 mg Q8HRS PRN   • [START ON 8/16/2019] heparin  5,000 Units Q12HRS   • traZODone  50 mg QHS PRN   • ondansetron  4 mg Q4HRS PRN   • oxyCODONE immediate release  20 mg 4X/DAY   • LORazepam  0.5 mg Q4HRS PRN   • amLODIPine  10 mg QDAY PRN   • ascorbic acid  500 mg DAILY   • cholecalciferol  400 Units DAILY   • docusate sodium  100 mg DAILY   • ferrous sulfate  325 mg DAILY   • lacosamide  100 mg BID    • lidocaine  1 Patch Q24HR   • omeprazole  20 mg DAILY   • NS  250 mL DIALYSIS PRN   • albumin human 25%  12.5 g DIALYSIS PRN   • lidocaine  1 mL PRN   • epoetin rk  2,000 Units MO, WE + FR    And   • epoetin rk  2,000 Units MO, WE + FR   • NS  250 mL DIALYSIS PRN   • albumin human 25%  12.5 g DIALYSIS PRN   • lidocaine  1 mL PRN   • senna-docusate  2 Tab BID    And   • polyethylene glycol/lytes  1 Packet QDAY PRN    And   • magnesium hydroxide  30 mL QDAY PRN    And   • bisacodyl  10 mg QDAY PRN   • enalaprilat  1.25 mg Q6HRS PRN   • hydrALAZINE  20 mg Q6HRS PRN       Physical Exam   Vitals:    08/15/19 1506 08/15/19 1913 08/15/19 2027 08/15/19 2041   BP: 101/55 109/52 110/56 106/79   Pulse: 94 96 99 99   Resp: 18 18     Temp: 36.3 °C (97.4 °F) 36.4 °C (97.5 °F)     TempSrc: Oral Oral     SpO2: 91% 98%     Weight:       Height:           Physical Exam   Constitutional: She is oriented to person, place, and time. No distress.   HENT:   Head: Normocephalic and atraumatic.   Eyes: Pupils are equal, round, and reactive to light. Conjunctivae are normal. No scleral icterus.   Neck: Normal range of motion. Neck supple.   Cardiovascular: Normal rate, regular rhythm and intact distal pulses. Exam reveals no gallop and no friction rub.   No murmur heard.  L anterior chest port, no erythema.   Pulmonary/Chest: Effort normal. No respiratory distress. She has no wheezes. She has rales. She exhibits no tenderness.   Abdominal: Soft. Bowel sounds are normal. She exhibits no distension and no mass. There is no tenderness. There is no rebound and no guarding.   Musculoskeletal: Normal range of motion. She exhibits no edema or tenderness.   RUE fistula   Neurological: She is alert and oriented to person, place, and time.   Skin: Skin is warm and dry. She is not diaphoretic.   Small cyst in the left mandible.  No purulent discharge were able to be exuded on exam.   Psychiatric: Mood and affect normal.   Nursing note and vitals  reviewed.       Laboratory   Recent Labs     19  0400 19  0400 08/15/19  0315   WBC 5.7 15.0* 11.0*   RBC 3.11* 4.31 4.26   HEMOGLOBIN 9.7* 13.1 13.0   HEMATOCRIT 29.3* 39.9 40.0       Recent Labs     19  1542 19  0400 08/15/19  0315   SODIUM 139 136 137   POTASSIUM 4.6 4.0 4.8   CHLORIDE 92* 93* 91*   CO2 22 18* 22   GLUCOSE 103* 81 95   BUN 57* 68* 65*   CREATININE 12.86* 14.36* 11.06*         Blood Culture   Date Value Ref Range Status   2018 No growth after 5 days of incubation.  Final             Radiology  EC-ECHOCARDIOGRAM COMPLETE W/O CONT  Transthoracic  Echo Report    Echocardiography Laboratory    CONCLUSIONS  Normal left ventricular systolic function. Left ventricular ejection   fraction is visually estimated to be 55%.   Indeterminate diastolic function.  Normal inferior vena cava size without inspiratory collapse.  Estimated right ventricular systolic pressure is 26 mmHg.  In the apical 4 chamber view, there is a linear somewhat mobile   echodensity adherent to the tricuspid valve which could represent a   catheter vs. artifact. A valvular vegetation is possible as well.  The   valve is not well visualized in other views. May consider limited echo   to re-evaluate tricuspid valve further. If this finding is present on   other views, the patient may need a transesophageal echocardiogram.   Dr. Leigh was personally updated about the above findings at the time   of the read.     TAYLOR WARD  Exam Date:         08/15/2019                      15:34  Exam Location:     Inpatient  Priority:          Routine    Ordering Physician:        JONY LEIGH  Referring Physician:       [id number] Rivers  Sonographer:               LIANG Zimmerman    Age:    38     Gender:    F  MRN:    [id number]  :    1980  BSA:    1.85   Ht (in):    65     Wt (lb):    170  Exam Type:     Complete    Indications:     Unspecified systolic (congestive) heart  failure  ICD Codes:       I50.20    CPT Codes:       34306    BP:   111    /   66     HR:   90  Technical Quality:       Fair    MEASUREMENTS  (Male / Female) Normal Values  2D ECHO  LV Diastolic Diameter PLAX        4 cm                  4.2 - 5.9 / 3.9 - 5.3   cm  LV Systolic Diameter PLAX         1.4 cm                2.1 - 4.0 cm  IVS Diastolic Thickness           1.1 cm                  LVPW Diastolic Thickness          0.92 cm                 LVOT Diameter                     2 cm                    Estimated LV Ejection Fraction    55 %                    LV Ejection Fraction MOD BP       56.2 %                >= 55  %  LV Ejection Fraction MOD 4C       57.4 %                  LV Ejection Fraction MOD 2C       54.9 %                  IVC Diameter                      1.4 cm                    DOPPLER  AV Peak Velocity                  1.6 m/s                 AV Peak Gradient                  10 mmHg                 AV Mean Gradient                  7.2 mmHg                LVOT Peak Velocity                1.4 m/s                 AV Area Cont Eq vti               2.9 cm²                 Mitral E Point Velocity           0.6 m/s                 Mitral E to A Ratio               0.71                    Mitral A Duration                 92.3 ms                 MV Pressure Half Time             70.4 ms                 MV Area PHT                       3.1 cm²                 MV Deceleration Time              243 ms                  TR Peak Velocity                  214 cm/s                PV Peak Velocity                  1.9 m/s                 PV Peak Gradient                  13.9 mmHg               RVOT Peak Velocity                1.5 m/s                   * Indicates values subject to auto-interpretation  LV EF:  55    %    FINDINGS  Left Ventricle  Normal left ventricular chamber size. Normal left ventricular wall   thickness. Normal left ventricular systolic function. Left ventricular   ejection fraction  is visually estimated to be 55%. Normal regional wall   motion. Indeterminate diastolic function.    Right Ventricle  Normal right ventricular size and systolic function.    Right Atrium  The right atrium is normal in size.  Normal inferior vena cava size   without inspiratory collapse.    Left Atrium  The left atrium is normal in size.  Left atrial volume index is 16   mL/sq m.    Mitral Valve  Structurally normal mitral valve without significant stenosis. Trace   mitral regurgitation.    Aortic Valve  Tricuspid aortic valve. No stenosis or regurgitation seen.    Tricuspid Valve  Structurally normal tricuspid valve. Trace tricuspid regurgitation.   Estimated right ventricular systolic pressure is 26 mmHg. Right atrial   pressure is estimated to be 8 mmHg.  In the apical 4 chamber view, there is a linear somewhat mobile   echodensity adherent to the tricuspid valve which could represent a   catheter vs. artifact. A valvular vegetation is possible as well. The   valve is not well visualized in other views. May consider limited echo   to re-evaluate tricuspid valve further.   In discussion with primary team, the patient has a port-a-cath which is   likely visualized terminating around the free wall of the right atrium.    Pulmonic Valve  The pulmonic valve is not well visualized.  Mild pulmonic   insufficiency.    Pericardium  Normal pericardium without effusion.    Aorta  The aortic root is normal.  Ascending aorta diameter is 2.6 cm.    Vanita Viveros MD  (Electronically Signed)  Final Date:     15 August 2019                   17:28         Results for orders placed or performed during the hospital encounter of 07/19/17   ECHOCARDIOGRAM COMP W/O CONT   Result Value Ref Range    Eject.Frac. MOD BP 58.38     Eject.Frac. MOD 4C 67.14     Eject.Frac. MOD 2C 67.81     Left Ventrical Ejection Fraction 60         Assessment and Plan    Principal Problem:    Acute respiratory failure with hypoxia (HCC) POA: Yes.  Cont to wean  oxygen.  Patient report passing out prior to presentation.  I order an echo and the results as above.    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  I personally discussed the echo image result with Dr. Viveros.  She is concerned that there is a vegetation on the tricuspid valve, but this could also be artifact or part of her central line.  Noted blood culture on presentation negative.  Thought leukocytosis present.  For prudent, I will draw blood culture from her port as well as peripheral.  Tomorrow, consider repeating a limited echocardiogram looking specifically at the tricuspid valve to further visualize the lesion.  We will hold off on starting antibiotics as there is no target at this time.    Left mandible mass (new): I had attempted to I&D the lesion, but no purulent material was able to be exude.  Suspect cystic in nature.  No pain or tenderness.  No surrounding erythema.  We will continue to monitor.       Azotemia POA: yes and ESRD (end stage renal disease) on dialysis (HCC) POA: Yes.  HD per Nephrology.    Avascular necrosis of bones of both hips ([facility]) POA: Yes.  Pain control/support.  Outpt f/u with Ortho for surgery.    Seizures ([facility]) POA: Unknown.  None recently.  Cont Vimpat.    Hypertension POA: Yes.  BP controlled with HD    Anemia of CKD:  Cont iron supplement.  Epogen.      Insomnia (new):  Start trazodone PRN at night    DVT prophylaxis: SC heparin.    CODE STATUS:  Full Code    Disposition: TBD.    Case was discussed with Charge Nurse, Medical SW, , and Pharmacist on IDTround in addition to individual(s) mentioned above.    I have performed a physical exam and reviewed and updated ROS as of today, 8/15/2019.  In review of yesterday's note dated, 8/14/2019, there are no changes except as documented above.      Martina Leigh M.D.

## 2019-08-16 NOTE — PROGRESS NOTES
Telemetry Shift Summary     Rhythm SR  HR Range   Ectopy PVC occasional   Measurements 0.16 0.08   0.38           Normal Values  Rhythm SR  HR Range    Measurements 0.12-0.20 / 0.06-0.10  / 0.30-0.52

## 2019-08-16 NOTE — CARE PLAN
Problem: Communication  Goal: The ability to communicate needs accurately and effectively will improve  Outcome: PROGRESSING AS EXPECTED  Note:   Allow time for patient to verbalize feelings and ask questions. Answer questions to best of ability. Update patient on plan of care.       Problem: Respiratory:  Goal: Respiratory status will improve  Outcome: PROGRESSING SLOWER THAN EXPECTED  Note:   Assess need for oxygen. Titrate oxygen as needed. Collaborate with respiratory therapy as needed.

## 2019-08-17 ENCOUNTER — APPOINTMENT (OUTPATIENT)
Dept: RADIOLOGY | Facility: MEDICAL CENTER | Age: 39
DRG: 189 | End: 2019-08-17
Attending: INTERNAL MEDICINE
Payer: MEDICARE

## 2019-08-17 PROBLEM — I07.9 ENDOCARDITIS OF TRICUSPID VALVE: Status: ACTIVE | Noted: 2019-08-17

## 2019-08-17 LAB
ANION GAP SERPL CALC-SCNC: 15 MMOL/L (ref 0–11.9)
BACTERIA BLD CULT: NORMAL
BACTERIA BLD CULT: NORMAL
BASOPHILS # BLD AUTO: 0.6 % (ref 0–1.8)
BASOPHILS # BLD: 0.04 K/UL (ref 0–0.12)
BUN SERPL-MCNC: 41 MG/DL (ref 8–22)
CALCIUM SERPL-MCNC: 9.2 MG/DL (ref 8.4–10.2)
CHLORIDE SERPL-SCNC: 91 MMOL/L (ref 96–112)
CO2 SERPL-SCNC: 26 MMOL/L (ref 20–33)
CREAT SERPL-MCNC: 8.33 MG/DL (ref 0.5–1.4)
EOSINOPHIL # BLD AUTO: 0.29 K/UL (ref 0–0.51)
EOSINOPHIL NFR BLD: 4.3 % (ref 0–6.9)
ERYTHROCYTE [DISTWIDTH] IN BLOOD BY AUTOMATED COUNT: 42.2 FL (ref 35.9–50)
GLUCOSE SERPL-MCNC: 96 MG/DL (ref 65–99)
HCT VFR BLD AUTO: 32.1 % (ref 37–47)
HGB BLD-MCNC: 10.3 G/DL (ref 12–16)
IMM GRANULOCYTES # BLD AUTO: 0.06 K/UL (ref 0–0.11)
IMM GRANULOCYTES NFR BLD AUTO: 0.9 % (ref 0–0.9)
LYMPHOCYTES # BLD AUTO: 0.88 K/UL (ref 1–4.8)
LYMPHOCYTES NFR BLD: 12.9 % (ref 22–41)
MCH RBC QN AUTO: 30.7 PG (ref 27–33)
MCHC RBC AUTO-ENTMCNC: 32.1 G/DL (ref 33.6–35)
MCV RBC AUTO: 95.8 FL (ref 81.4–97.8)
MONOCYTES # BLD AUTO: 0.83 K/UL (ref 0–0.85)
MONOCYTES NFR BLD AUTO: 12.2 % (ref 0–13.4)
NEUTROPHILS # BLD AUTO: 4.7 K/UL (ref 2–7.15)
NEUTROPHILS NFR BLD: 69.1 % (ref 44–72)
NRBC # BLD AUTO: 0 K/UL
NRBC BLD-RTO: 0 /100 WBC
PHOSPHATE SERPL-MCNC: 5.9 MG/DL (ref 2.5–4.5)
PLATELET # BLD AUTO: 108 K/UL (ref 164–446)
PMV BLD AUTO: 11.5 FL (ref 9–12.9)
POTASSIUM SERPL-SCNC: 4 MMOL/L (ref 3.6–5.5)
RBC # BLD AUTO: 3.35 M/UL (ref 4.2–5.4)
SIGNIFICANT IND 70042: NORMAL
SIGNIFICANT IND 70042: NORMAL
SITE SITE: NORMAL
SITE SITE: NORMAL
SODIUM SERPL-SCNC: 132 MMOL/L (ref 135–145)
SOURCE SOURCE: NORMAL
SOURCE SOURCE: NORMAL
WBC # BLD AUTO: 6.8 K/UL (ref 4.8–10.8)

## 2019-08-17 PROCEDURE — 99233 SBSQ HOSP IP/OBS HIGH 50: CPT | Performed by: INTERNAL MEDICINE

## 2019-08-17 PROCEDURE — 99406 BEHAV CHNG SMOKING 3-10 MIN: CPT

## 2019-08-17 PROCEDURE — 700102 HCHG RX REV CODE 250 W/ 637 OVERRIDE(OP): Performed by: INTERNAL MEDICINE

## 2019-08-17 PROCEDURE — 770020 HCHG ROOM/CARE - TELE (206)

## 2019-08-17 PROCEDURE — 87040 BLOOD CULTURE FOR BACTERIA: CPT

## 2019-08-17 PROCEDURE — 700101 HCHG RX REV CODE 250: Performed by: INTERNAL MEDICINE

## 2019-08-17 PROCEDURE — 71250 CT THORAX DX C-: CPT

## 2019-08-17 PROCEDURE — 700111 HCHG RX REV CODE 636 W/ 250 OVERRIDE (IP)

## 2019-08-17 PROCEDURE — A9270 NON-COVERED ITEM OR SERVICE: HCPCS | Performed by: INTERNAL MEDICINE

## 2019-08-17 PROCEDURE — 700111 HCHG RX REV CODE 636 W/ 250 OVERRIDE (IP): Performed by: INTERNAL MEDICINE

## 2019-08-17 PROCEDURE — 80048 BASIC METABOLIC PNL TOTAL CA: CPT

## 2019-08-17 PROCEDURE — 700105 HCHG RX REV CODE 258: Performed by: INTERNAL MEDICINE

## 2019-08-17 PROCEDURE — 84100 ASSAY OF PHOSPHORUS: CPT

## 2019-08-17 PROCEDURE — 94760 N-INVAS EAR/PLS OXIMETRY 1: CPT

## 2019-08-17 PROCEDURE — 85025 COMPLETE CBC W/AUTO DIFF WBC: CPT

## 2019-08-17 PROCEDURE — 70551 MRI BRAIN STEM W/O DYE: CPT

## 2019-08-17 PROCEDURE — 700112 HCHG RX REV CODE 229: Performed by: INTERNAL MEDICINE

## 2019-08-17 RX ORDER — HYDROMORPHONE HYDROCHLORIDE 1 MG/ML
0.5 INJECTION, SOLUTION INTRAMUSCULAR; INTRAVENOUS; SUBCUTANEOUS ONCE
Status: COMPLETED | OUTPATIENT
Start: 2019-08-17 | End: 2019-08-17

## 2019-08-17 RX ORDER — HYDROXYZINE HYDROCHLORIDE 10 MG/1
10 TABLET, FILM COATED ORAL 3 TIMES DAILY PRN
Status: DISCONTINUED | OUTPATIENT
Start: 2019-08-17 | End: 2019-08-21 | Stop reason: HOSPADM

## 2019-08-17 RX ORDER — DIAZEPAM 5 MG/1
5 TABLET ORAL
Status: COMPLETED | OUTPATIENT
Start: 2019-08-17 | End: 2019-08-17

## 2019-08-17 RX ADMIN — AMPICILLIN AND SULBACTAM 1.5 G: 1; .5 INJECTION, POWDER, FOR SOLUTION INTRAVENOUS at 02:06

## 2019-08-17 RX ADMIN — HYDROMORPHONE HYDROCHLORIDE 0.5 MG: 1 INJECTION, SOLUTION INTRAMUSCULAR; INTRAVENOUS; SUBCUTANEOUS at 11:51

## 2019-08-17 RX ADMIN — SODIUM CHLORIDE, PRESERVATIVE FREE 500 UNITS: 5 INJECTION INTRAVENOUS at 01:20

## 2019-08-17 RX ADMIN — OMEPRAZOLE 20 MG: 20 CAPSULE, DELAYED RELEASE ORAL at 05:05

## 2019-08-17 RX ADMIN — HEPARIN SODIUM 5000 UNITS: 5000 INJECTION, SOLUTION INTRAVENOUS; SUBCUTANEOUS at 05:05

## 2019-08-17 RX ADMIN — DOCUSATE SODIUM 100 MG: 100 CAPSULE, LIQUID FILLED ORAL at 05:05

## 2019-08-17 RX ADMIN — OXYCODONE HYDROCHLORIDE 20 MG: 10 TABLET ORAL at 17:26

## 2019-08-17 RX ADMIN — SEVELAMER CARBONATE 800 MG: 800 TABLET, FILM COATED ORAL at 08:07

## 2019-08-17 RX ADMIN — HEPARIN SODIUM 5000 UNITS: 5000 INJECTION, SOLUTION INTRAVENOUS; SUBCUTANEOUS at 17:23

## 2019-08-17 RX ADMIN — DIAZEPAM 5 MG: 5 TABLET ORAL at 10:29

## 2019-08-17 RX ADMIN — AMPICILLIN AND SULBACTAM 1.5 G: 1; .5 INJECTION, POWDER, FOR SOLUTION INTRAVENOUS at 12:05

## 2019-08-17 RX ADMIN — SENNOSIDES AND DOCUSATE SODIUM 2 TABLET: 8.6; 5 TABLET ORAL at 05:05

## 2019-08-17 RX ADMIN — OXYCODONE HYDROCHLORIDE 20 MG: 10 TABLET ORAL at 08:07

## 2019-08-17 RX ADMIN — VANCOMYCIN HYDROCHLORIDE 2000 MG: 1 INJECTION, POWDER, LYOPHILIZED, FOR SOLUTION INTRAVENOUS at 02:07

## 2019-08-17 RX ADMIN — OXYCODONE HYDROCHLORIDE 20 MG: 10 TABLET ORAL at 21:45

## 2019-08-17 RX ADMIN — SEVELAMER CARBONATE 800 MG: 800 TABLET, FILM COATED ORAL at 17:24

## 2019-08-17 RX ADMIN — AMPICILLIN AND SULBACTAM 1.5 G: 1; .5 INJECTION, POWDER, FOR SOLUTION INTRAVENOUS at 22:09

## 2019-08-17 RX ADMIN — ONDANSETRON HYDROCHLORIDE 4 MG: 2 INJECTION, SOLUTION INTRAMUSCULAR; INTRAVENOUS at 08:07

## 2019-08-17 RX ADMIN — FERROUS SULFATE TAB 325 MG (65 MG ELEMENTAL FE) 325 MG: 325 (65 FE) TAB at 05:05

## 2019-08-17 RX ADMIN — OXYCODONE HYDROCHLORIDE AND ACETAMINOPHEN 500 MG: 500 TABLET ORAL at 05:05

## 2019-08-17 RX ADMIN — TRAZODONE HYDROCHLORIDE 50 MG: 50 TABLET ORAL at 21:45

## 2019-08-17 RX ADMIN — SENNOSIDES AND DOCUSATE SODIUM 2 TABLET: 8.6; 5 TABLET ORAL at 17:24

## 2019-08-17 RX ADMIN — CHOLECALCIFEROL (VITAMIN D3) 10 MCG (400 UNIT) TABLET 400 UNITS: at 05:06

## 2019-08-17 RX ADMIN — HYDROXYZINE HYDROCHLORIDE 10 MG: 10 TABLET, FILM COATED ORAL at 21:45

## 2019-08-17 RX ADMIN — LIDOCAINE 1 PATCH: 50 PATCH TOPICAL at 08:07

## 2019-08-17 RX ADMIN — CALCIUM CARBONATE (ANTACID) CHEW TAB 500 MG 500 MG: 500 CHEW TAB at 10:29

## 2019-08-17 RX ADMIN — LACOSAMIDE 100 MG: 50 TABLET, FILM COATED ORAL at 17:24

## 2019-08-17 RX ADMIN — SEVELAMER CARBONATE 800 MG: 800 TABLET, FILM COATED ORAL at 12:05

## 2019-08-17 RX ADMIN — LACOSAMIDE 100 MG: 50 TABLET, FILM COATED ORAL at 05:05

## 2019-08-17 ASSESSMENT — ENCOUNTER SYMPTOMS
CONSTIPATION: 0
FOCAL WEAKNESS: 0
ABDOMINAL PAIN: 0
FEVER: 0
DOUBLE VISION: 0
NAUSEA: 0
CHILLS: 0
PALPITATIONS: 0
VOMITING: 0
SHORTNESS OF BREATH: 0
WEAKNESS: 0
DIARRHEA: 0

## 2019-08-17 ASSESSMENT — LIFESTYLE VARIABLES: EVER_SMOKED: YES

## 2019-08-17 NOTE — PROGRESS NOTES
Patient tolerated her 3 hr Hd with net  ml. RUESLVIN AVG + B/T. Dr Douglas was notified of Bp 86/42 before Hd. Albumin 12.5 grams x2, reduced UF to 500 ml. See flow sheet for details. Report given to her Primary RN. Patient is alert. No acute distress noted.

## 2019-08-17 NOTE — CARE PLAN
Problem: Safety  Goal: Will remain free from injury  Description  SZ precautions implemented.    Outcome: PROGRESSING AS EXPECTED  Seizure and aspiration precautions in place.     Goal: Will remain free from falls  Outcome: PROGRESSING AS EXPECTED  Intervention: Assess risk factors for falls  Flowsheets  Taken 8/16/2019 2132  Date of Last Fall: 08/12/19  Fall Risk: Risk to Fall -  0 - 1 point  History of fall: 2  Mobility Status Assessment: 1-1 Healthcare Provider Required for Assistance with Ambulation & Transfer  Taken 8/16/2019 2000  Pt Calls for Assistance: Yes  Intervention: Implement fall precautions  Flowsheets  Taken 8/16/2019 2132  Bed Alarm: Yes - Alarm On  IV Pole on Same Side of Bed as Bathroom: Yes  Taken 8/16/2019 2000  Environmental Precautions: Treaded Slipper Socks on Patient;Personal Belongings, Wastebasket, Call Bell etc. in Easy Reach;Transferred to Stronger Side;Report Given to Other Health Care Providers Regarding Fall Risk;Bed in Low Position;Communication Sign for Patients & Families;Mobility Assessed & Appropriate Sign Placed     Problem: Venous Thromboembolism (VTW)/Deep Vein Thrombosis (DVT) Prevention:  Goal: Patient will participate in Venous Thrombosis (VTE)/Deep Vein Thrombosis (DVT)Prevention Measures  Outcome: PROGRESSING AS EXPECTED  Intervention: Assess and monitor for anticoagulation complications  Note:   Subcut Heparin in use. No s/s bleeding, VSS. Platelet 151, trending up.

## 2019-08-17 NOTE — RESPIRATORY CARE
COPD EDUCATION by COPD CLINICAL EDUCATOR  8/17/2019 at 3:28 PM by Hilda Alfonso     Patient reviewed by COPD education team. Patient does not have a history or diagnosis of COPD. Patient is a smoker, smoking cessation education done. A comprehensive packet with tips to quit and information on outpatient classes given to patient.

## 2019-08-17 NOTE — PROGRESS NOTES
"1900: received report from Nelia FINCH. Pt sitting up in bed AO4, calm with unlabored breathing. NC in place 4L/min. Denies any SOB. Reports chronic back/bilateral hip pain as \"5/10\" and denies need from interventions. Safety precautions in place.   2230: Received lab result from Anna: Anaerobic bottle from Blood culture positive for gram pos cocci possibly staph.   2240: Received call from Dr Leigh who had already review lab result.  ordered x2 blood culture one peripheral site, one port site. Unasyn and Vanco to be started.   0045: Following [facility] policies, port deaccessed and reaccessed with new posey needle using sterile technique to obtain blood culture.   0500: Pt slept well overnight without any issues. Denies any pain at this time.   0700: Report given to Nelia FINCH.   "

## 2019-08-17 NOTE — PROGRESS NOTES
Telemetry Shift Summary     Rhythm SR/ST  HR Range 77-97  Ectopy 0.16  Measurements 0.16/0.08/0.36           Normal Values  Rhythm SR  HR Range    Measurements 0.12-0.20 / 0.06-0.10  / 0.30-0.52

## 2019-08-17 NOTE — PROGRESS NOTES
HOSPITAL MEDICINE PROGRESS NOTE    Date of service  8/16/2019    Chief Complaint  Seizure (not been taking her medication)      Hospital Course:    38 y.o. 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  Found to have severe azotemia with BUN > 100, k 5.8, CXR c/w pulm edema consistent with fluid overload state.  He had no obvious seizures. Ct heard negative.  She is in overt resp distress on 10 l oxygen/min saturating at 84%, so admitted to the ICU for BIPAP and emergent dialysis on 8/12/19.  Transferred out of ICU 08/15/19.      On floor, echo suspicious for tricuspid valve vegetation.  Echo was done due to her report of syncope at home without obvious cause PTA.  Serial bl cx drawn, pending.         Interval History Updates:  No event overnight.  Afebrile.    Feeling about the same.  Reported getting up to bathroom with assistance OK.    Consultants/Specialty  Nephrology  Critical Care    Review of Systems   Review of Systems   Constitutional: Negative for chills and fever.   Respiratory: Negative for shortness of breath.    Cardiovascular: Negative for chest pain, palpitations and leg swelling.   Gastrointestinal: Negative for abdominal pain, constipation, diarrhea, nausea and vomiting.   Musculoskeletal: Negative for back pain.   Skin: Negative for rash.   Neurological: Negative for focal weakness.   Endo/Heme/Allergies: Negative.    All other systems reviewed and are negative.       Medications:  • sevelamer carbonate  800 mg TID WITH MEALS   • calcium carbonate  500 mg Q8HRS PRN   • heparin  5,000 Units Q12HRS   • traZODone  50 mg QHS PRN   • ondansetron  4 mg Q4HRS PRN   • oxyCODONE immediate release  20 mg 4X/DAY    • LORazepam  0.5 mg Q4HRS PRN   • amLODIPine  10 mg QDAY PRN   • ascorbic acid  500 mg DAILY   • cholecalciferol  400 Units DAILY   • docusate sodium  100 mg DAILY   • ferrous sulfate  325 mg DAILY   • lacosamide  100 mg BID   • lidocaine  1 Patch Q24HR   • omeprazole  20 mg DAILY   • NS  250 mL DIALYSIS PRN   • albumin human 25%  12.5 g DIALYSIS PRN   • lidocaine  1 mL PRN   • epoetin rk  2,000 Units MO, WE + FR    And   • epoetin rk  2,000 Units MO, WE + FR   • NS  250 mL DIALYSIS PRN   • albumin human 25%  12.5 g DIALYSIS PRN   • lidocaine  1 mL PRN   • senna-docusate  2 Tab BID    And   • polyethylene glycol/lytes  1 Packet QDAY PRN    And   • magnesium hydroxide  30 mL QDAY PRN    And   • bisacodyl  10 mg QDAY PRN   • enalaprilat  1.25 mg Q6HRS PRN   • hydrALAZINE  20 mg Q6HRS PRN       Physical Exam   Vitals:    08/16/19 0846 08/16/19 1220 08/16/19 1351 08/16/19 1700   BP: 102/68 147/90 (!) 92/67 109/50   Pulse:  86 88 89   Resp:  18 18 18   Temp:   36.4 °C (97.5 °F) 36.3 °C (97.3 °F)   TempSrc:   Oral Oral   SpO2:   100% 97%   Weight:       Height:           Physical Exam   Constitutional: She is oriented to person, place, and time. No distress.   HENT:   Head: Normocephalic and atraumatic.   Eyes: Pupils are equal, round, and reactive to light. Conjunctivae are normal. No scleral icterus.   Neck: Normal range of motion. Neck supple.   Cardiovascular: Normal rate, regular rhythm and intact distal pulses. Exam reveals no gallop and no friction rub.   No murmur heard.  L anterior chest port, no erythema.   Pulmonary/Chest: Effort normal. No respiratory distress. She has no wheezes. She has rales. She exhibits no tenderness.   Abdominal: Soft. Bowel sounds are normal. She exhibits no distension and no mass. There is no tenderness. There is no rebound and no guarding.   Musculoskeletal: Normal range of motion. She exhibits no edema or tenderness.   RUE fistula   Neurological: She is alert and oriented to  person, place, and time.   Skin: Skin is warm and dry. She is not diaphoretic.   Small cyst in the left mandible.  No purulent discharge were able to be exuded on exam.   Psychiatric: Mood and affect normal.   Nursing note and vitals reviewed.       Laboratory   Recent Labs     19  0400 08/15/19  0315 19  0258   WBC 15.0* 11.0* 8.9   RBC 4.31 4.26 3.84*   HEMOGLOBIN 13.1 13.0 11.7*   HEMATOCRIT 39.9 40.0 36.3*       Recent Labs     19  0400 08/15/19  0315 19  0258   SODIUM 136 137 134*   POTASSIUM 4.0 4.8 4.7   CHLORIDE 93* 91* 89*   CO2 18* 22 23   GLUCOSE 81 95 100*   BUN 68* 65* 87*   CREATININE 14.36* 11.06* 13.45*         Blood Culture   Date Value Ref Range Status   2018 No growth after 5 days of incubation.  Final      Bl cx 8/15  Bl cx        Radiology  EC-ECHOCARDIOGRAM LTD W/O CONT  Transthoracic  Echo Report    Echocardiography Laboratory    CONCLUSIONS  Prior echocardiogram 8/15/2019.Technically difficult study - adequate   information is obtained.   Limited.   Echogenic mobile mass noted on the septal tricuspid valve leaflet   measuring 1.0 cm in width and 0.8 cm in length.    TAYLOR WARD  Exam Date:         2019                      16:11  Exam Location:     Inpatient  Priority:          Routine    Ordering Physician:        JONY JEFFERSON  Referring Physician:       [id number] Rivers  Sonographer:               Nayeli Cartagena RDCS    Age:    38     Gender:    F  MRN:    [id number]  :    1980  BSA:    1.84   Ht (in):    65     Wt (lb):    169  Exam Type:     Limited    Indications:     Endocarditis  ICD Codes:       421    CPT Codes:       44268    BP:   97     /   50     HR:   80  Technical Quality:       Technically difficult study -                            adequate information is obtained    MEASUREMENTS  (Male / Female) Normal Values    * Indicates values subject to auto-interpretation  LV EF:        %    FINDINGS  Left Ventricle    Right  Ventricle    Right Atrium    Left Atrium    Mitral Valve    Aortic Valve    Tricuspid Valve  The tricuspid valve is not well visualized. Echogenic mobile mass noted   on the septal tricuspid valve leaflet measuring 1.0 cm in width and 0.8   cm in length. Unable to rule out vegetation.    Pulmonic Valve    Pericardium    Aorta    Trang Pérez M.D.  (Electronically Signed)  Final Date:     16 August 2019                   17:47         Results for orders placed or performed during the hospital encounter of 07/19/17   ECHOCARDIOGRAM COMP W/O CONT   Result Value Ref Range    Eject.Frac. MOD BP 58.38     Eject.Frac. MOD 4C 67.14     Eject.Frac. MOD 2C 67.81     Left Ventrical Ejection Fraction 60         Assessment and Plan    Principal Problem:      Acute respiratory failure with hypoxia (HCC) POA: Yes.  Cont to wean oxygen.      Syncope POA Yes:  Patient reports passing out (was on the floor for unknown amount of time, woke up called 911) prior to presentation, so echo was ordered to assess cardiac function.  This resulted in below.      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  I personally discussed the echo image result with Dr. Viveros on 8/15/19.  She is concerned that there is a vegetation on the tricuspid valve, but this could also be artifact or part of her central line.  Noted blood culture on presentation negative.  Though leukocytosis present.  Serial draw blood cultures from her port as well as peripheral done 8/15, 8/16.  Order limited echo to focus on tricuspid valve today.  We will hold off on starting antibiotics as there is no target at this time.  I will consider GAYATHRI and brain MRI to assess embolic phenomenon pending limited echo result today.      Hypotension (new):  SBP now 90's.  Not symptomatic.  Watch volume status.  Echo LV 55%.  Monitor.      Left mandible mass (new): I had attempted to I&D the lesion 8/15, but no purulent material was able to be exude.  Suspect cystic in  nature.  No pain or tenderness.  No surrounding erythema.  We will continue to monitor.      Azotemia POA: yes and ESRD (end stage renal disease) on dialysis (HCC) POA: Yes.  On going HD per Nephrology.    Avascular necrosis of bones of both hips (HCC) POA: Yes.  Pain control/support.  Outpt f/u with Ortho for surgery.    Seizures (HCC) POA: Unknown.  None recently.  Cont Vimpat.    Hypertension POA: Yes.  BP controlled with HD    Anemia of CKD:  Cont iron supplement.  Epogen.      Insomnia (new):  Start trazodone PRN at night    DVT prophylaxis: SC heparin.    CODE STATUS:  Full Code    Disposition: TBD.    Case was discussed with Charge Nurse, Medical SW, , and Pharmacist on IDTround in addition to individual(s) mentioned above.    I have performed a physical exam and reviewed and updated ROS as of today, 8/16/2019.  In review of yesterday's note dated, 8/15/2019, there are no changes except as documented above.      Martina Leigh M.D.

## 2019-08-17 NOTE — PROGRESS NOTES
Telemetry Shift Summary    Rhythm SR  HR Range 80-90  Ectopy  No ectopy    Measurements for strip printed 8/16/19 at 1851:        0.14/0.08/0.36        Normal Values  Rhythm SR  HR Range    Measurements 0.12-0.20 / 0.06-0.10  / 0.30-0.52

## 2019-08-17 NOTE — CARE PLAN
Problem: Communication  Goal: The ability to communicate needs accurately and effectively will improve  Outcome: PROGRESSING AS EXPECTED     Problem: Safety  Goal: Will remain free from injury  Description  SZ precautions implemented.    Outcome: PROGRESSING AS EXPECTED  Note:   SBA and walker in room, steady gait, calling for assistance, A&Ox4.      Problem: Venous Thromboembolism (VTW)/Deep Vein Thrombosis (DVT) Prevention:  Goal: Patient will participate in Venous Thrombosis (VTE)/Deep Vein Thrombosis (DVT)Prevention Measures  Outcome: PROGRESSING AS EXPECTED  Note:   Sq heparin. SCD's on while in bed.      Problem: Pain Management  Goal: Pain level will decrease to patient's comfort goal  Outcome: PROGRESSING AS EXPECTED  Intervention: Follow pain managment plan developed in collaboration with patient and Interdisciplinary Team  Note:   Chronic back pain controlled with scheduled meds. Oxycodone held at 1300 due to pt receiving dilaudid for MRI scan.      Problem: Skin Integrity  Goal: Risk for impaired skin integrity will decrease  Outcome: PROGRESSING AS EXPECTED  Note:   Turns self in bed. R arm fistula WNL (MWF dialysis). L ear cyst- pt request to be covered with band aide despite no drainage.      Problem: Infection  Goal: Will remain free from infection  Description  Labs monitored pt afebrile at this time.    Note:   BC + from 8/16 per lab, Martina CARRIZALES notified. ABX per orders. CT of chest and MRI of head completed (valium and dilaudid given to tolerate scans). Pt remains A.febrile

## 2019-08-17 NOTE — PROCEDURES
Diagnosis: End-Stage Renal Disease. Patient seen and examined on hemodialysis during treatment. Patient is stable, tolerating HD. Denies chest pain and shortness of breath. Orders updated as needed. Please refer to flowsheet for full details.    Access: JOHN GUTIERREZ  UF goal: 0.5L    Bora Douglas MD  Nephrology

## 2019-08-17 NOTE — PROGRESS NOTES
"Pharmacy Kinetics 38 y.o. female on vancomycin day # 1 2019    Currently on Vancomycin 2000 mg iv given 0207     Indication for Treatment: Endocarditis    Pertinent history per medical record: Admitted on 2019 for azotemia due to missing hemodialysis sessions.  ECHO performed due to syncope suspicious for tricuspid valve vegetation; blood culture from line growing possible Staph.  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), chronic pain with opioid dependence, HTN, seizures, hx ESBL    Other antibiotics: Unasyn 1.5 gm IV Q12H    Allergies: Lorazepam [ativan]; Morphine; and Seasonal     List concerns for renal function: ESRD; hemodialysis 3x week    Pertinent cultures to date:   19 UC NGTD  19 BCp x 2 NGTD  8/15/19 BCp NGTD  19 BC Line possible Staph    MRSA nares swab if pneumonia is a concern (ordered/positive/negative/n-a): NA    Recent Labs     08/15/19  0315 19  0258 19  0130   WBC 11.0* 8.9 6.8   NEUTSPOLYS 78.50* 73.50* 69.10     Recent Labs     08/15/19  0315 19  0258 19  0130   BUN 65* 87* 41*   CREATININE 11.06* 13.45* 8.33*     No results for input(s): VANCOTROUGH, VANCOPEAK, VANCORANDOM in the last 72 hours.    Intake/Output Summary (Last 24 hours) at 2019 0639  Last data filed at 2019 0500  Gross per 24 hour   Intake 1740 ml   Output 955 ml   Net 785 ml      /52   Pulse 69   Temp 37.1 °C (98.8 °F) (Oral)   Resp 18   Ht 1.651 m (5' 5\")   Wt 77.7 kg (171 lb 4.8 oz)   SpO2 100%  Temp (24hrs), Av.6 °C (97.8 °F), Min:36.3 °C (97.3 °F), Max:37.1 °C (98.8 °F)      A/P   1. Vancomycin dose: anticipate twice weekly dosing  2. Next vancomycin level: post hemodialysis Monday, Aug 19  3. Goal trough: 16 - 20 mcg/ml  4. Comments: Plan to check level after next hemodialysis (HD) treatment.  Anticipate twice weekly dosing depending on HD schedule.  Will monitor daily per protocol.    ESTEFANY HarrisD    "

## 2019-08-17 NOTE — PROGRESS NOTES
Hospital medicine progress note    I follow-up patient's blood cultures tonight.  Blood culture 8/16/19 positive for staph aureus aureus, source (line) is from her left anterior access port.  Leukocytosis has resolved.  Blood pressure is slightly hypotensive, but this has been noted in the a.m. around.  Patient has been asymptomatic.  Tachycardia has resolved.  Afebrile.    Order STAT blood cultures x2 to repeat as a third set tonight.  1 of the 2 bottles will be drawn from the left anterior port as previously done.  Order vancomycin and Unasyn, dose to her renal function to be administer first dose now.  I then called the nurse, Elicia Laird, to communicate the above plan and to ask that she call laboratory/micro to draw the blood cultures prior to initiation of antibiotics.    Start and stop time:  Start and stop time:  10:45 - 11:02 PM.

## 2019-08-17 NOTE — PROGRESS NOTES
HOSPITAL MEDICINE PROGRESS NOTE    Date of service  8/17/2019    Chief Complaint  Seizure (not been taking her medication)      Hospital Course:    38 y.o. 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  Found to have severe azotemia with BUN > 100, k 5.8, CXR c/w pulm edema consistent with fluid overload state.  He had no obvious seizures. Ct heard negative.  She is in overt resp distress on 10 l oxygen/min saturating at 84%, so admitted to the ICU for BIPAP and emergent dialysis on 8/12/19.  Transferred out of ICU 08/15/19.      When I spoke to her on 8/15/19, she reported syncopal episode at home PTA.  She was apparently down on the floor of her home for unknown period of time.  A TTE on 8/15/19 suspicious for tricuspid valve vegetation.  A focus echo followed on 8/16/19 to look at the tricuspid valve the following day and confirmed TV vegetation.  Night of 8/16/19, blood culture drawn on 8/16/19 from her left chest port returned positive for gram positive cocci, prelim Staph, so vancomycin and Unasyn initiated after another set of blood cultures were ordered.       Interval History Updates:  Afebrile.  Bl cx positive for gram + cocci.  Vancomycin and Unasyn started last night  Limited echo of TV positive for vegetation    Consultants/Specialty  Nephrology  Critical Care    Review of Systems   Review of Systems   Constitutional: Positive for malaise/fatigue. Negative for chills and fever.   Eyes: Negative for double vision.   Respiratory: Negative for shortness of breath.    Cardiovascular: Negative for chest pain, palpitations and leg swelling.   Gastrointestinal: Negative for abdominal pain, constipation, diarrhea,  nausea and vomiting.   Skin: Negative for rash.   Neurological: Negative for focal weakness and weakness.   Endo/Heme/Allergies: Negative.    All other systems reviewed and are negative.       Medications:  • heparin pf  300-500 Units PRN   • diazePAM  5 mg Once PRN   • sevelamer carbonate  800 mg TID WITH MEALS   • MD Alert...Vancomycin per Pharmacy   PHARMACY TO DOSE   • ampicillin-sulbactam (UNASYN) IV  1.5 g Q12HRS   • vancomycin  25 mg/kg Once   • calcium carbonate  500 mg Q8HRS PRN   • heparin  5,000 Units Q12HRS   • traZODone  50 mg QHS PRN   • ondansetron  4 mg Q4HRS PRN   • oxyCODONE immediate release  20 mg 4X/DAY   • LORazepam  0.5 mg Q4HRS PRN   • amLODIPine  10 mg QDAY PRN   • ascorbic acid  500 mg DAILY   • cholecalciferol  400 Units DAILY   • docusate sodium  100 mg DAILY   • ferrous sulfate  325 mg DAILY   • lacosamide  100 mg BID   • lidocaine  1 Patch Q24HR   • omeprazole  20 mg DAILY   • NS  250 mL DIALYSIS PRN   • albumin human 25%  12.5 g DIALYSIS PRN   • lidocaine  1 mL PRN   • epoetin rk  2,000 Units MO, WE + FR    And   • epoetin rk  2,000 Units MO, WE + FR   • senna-docusate  2 Tab BID    And   • polyethylene glycol/lytes  1 Packet QDAY PRN    And   • magnesium hydroxide  30 mL QDAY PRN    And   • bisacodyl  10 mg QDAY PRN   • enalaprilat  1.25 mg Q6HRS PRN   • hydrALAZINE  20 mg Q6HRS PRN       Physical Exam   Vitals:    08/16/19 1700 08/16/19 1909 08/16/19 2300 08/17/19 0401   BP: 109/50 (!) 98/50 103/51 112/52   Pulse: 89 89 76 69   Resp: 18 18 18 18   Temp: 36.3 °C (97.3 °F) 36.7 °C (98 °F) 36.6 °C (97.8 °F) 37.1 °C (98.8 °F)   TempSrc: Oral Oral Oral Oral   SpO2: 97% 99% 100% 100%   Weight:    77.7 kg (171 lb 4.8 oz)   Height:           Physical Exam   Constitutional: She is oriented to person, place, and time. No distress.   HENT:   Head: Normocephalic and atraumatic.   Eyes: Pupils are equal, round, and reactive to light. Conjunctivae are normal. No scleral icterus.   Neck: Normal  range of motion. Neck supple.   Cardiovascular: Normal rate, regular rhythm and intact distal pulses. Exam reveals no gallop and no friction rub.   No murmur heard.  L anterior chest port, no erythema.   Pulmonary/Chest: Effort normal. No respiratory distress. She has no wheezes. She has rales. She exhibits no tenderness.   Abdominal: Soft. Bowel sounds are normal. She exhibits no distension and no mass. There is no tenderness. There is no rebound and no guarding.   Musculoskeletal: Normal range of motion. She exhibits no edema or tenderness.   RUE fistula   Neurological: She is alert and oriented to person, place, and time.   Skin: Skin is warm and dry. She is not diaphoretic.   Small cyst in the left mandible.  No purulent discharge were able to be exuded on exam.   Psychiatric: Mood and affect normal.   Nursing note and vitals reviewed.       Laboratory   Recent Labs     08/15/19  0315 08/16/19  0258 08/17/19  0130   WBC 11.0* 8.9 6.8   RBC 4.26 3.84* 3.35*   HEMOGLOBIN 13.0 11.7* 10.3*   HEMATOCRIT 40.0 36.3* 32.1*       Recent Labs     08/15/19  0315 08/16/19  0258 08/17/19  0130   SODIUM 137 134* 132*   POTASSIUM 4.8 4.7 4.0   CHLORIDE 91* 89* 91*   CO2 22 23 26   GLUCOSE 95 100* 96   BUN 65* 87* 41*   CREATININE 11.06* 13.45* 8.33*         Blood Culture   Date Value Ref Range Status   12/04/2018 No growth after 5 days of incubation.  Final      Bl cx 8/15 peripheral NGTD  Bl cx 8/15 line positive for gram positive bacteria. Source: left chest port.    Bl cx 8/16 peripheral NGTD  Bl cx 8/16 line x2 NGTD       Radiology  EC-ECHOCARDIOGRAM LTD W/O CONT 8/16/19  CONCLUSIONS  Prior echocardiogram 8/15/2019.Technically difficult study - adequate   information is obtained.   Limited.   Echogenic mobile mass noted on the septal tricuspid valve leaflet   measuring 1.0 cm in width and 0.8 cm in length.    TTE 8/15/19:  CONCLUSIONS  Normal left ventricular systolic function. Left ventricular ejection   fraction is  "visually estimated to be 55%.   Indeterminate diastolic function.  Normal inferior vena cava size without inspiratory collapse.  Estimated right ventricular systolic pressure is 26 mmHg.  In the apical 4 chamber view, there is a linear somewhat mobile   echodensity adherent to the tricuspid valve which could represent a   catheter vs. artifact. A valvular vegetation is possible as well.  The   valve is not well visualized in other views. May consider limited echo   to re-evaluate tricuspid valve further. If this finding is present on   other views, the patient may need a transesophageal echocardiogram.   Dr. Leigh was personally updated about the above findings at the time   of the read.        Results for orders placed or performed during the hospital encounter of 07/19/17   ECHOCARDIOGRAM COMP W/O CONT   Result Value Ref Range    Eject.Frac. MOD BP 58.38     Eject.Frac. MOD 4C 67.14     Eject.Frac. MOD 2C 67.81     Left Ventrical Ejection Fraction 60         Assessment and Plan    Principal Problem:    Acute bacteria tricuspid valve endocarditis: New problem.  Bl cx 8/16/19 from chest port positive for gram positive cocci, then on 8/17 report changes to \"growth detected...Negative for Staphylococcus aureus and MRSA by PCR...further report to follow\".   Both echos confirm TV vegetation.  Serial bl cx pending result.  Cont vancomycin + Unasyn (D#2) and will tailor abx when ID/SENS available.  Will consider GAYATHRI.       Syncope POA Yes:  Patient reports passing out (was on the floor for unknown amount of time, woke up called 911) prior to presentation, so echo was ordered to assess cardiac function.  Possible PE as an etiology although she was very fluid over load on presentation.  ?septic emboli.  I discussed with Dr. Douglas regarding CONT as patient is oliguric.  He recs avoid CONT for now, so we will proceed with CT chest NON contrast to eval for septic embolism.  Order MRI brain to r/o stroke, though low risk for " this unless she has right to left shunt, which is so far not seen.      Acute respiratory failure with hypoxia (HCC) POA: Yes.  Cont to wean oxygen.  HD to remove fluid per Nephrology.    Possible infected left chest port:  This will have to be removed.  I have reviewed the medical record extensively today, but so far unable to find when this was placed and by whom.  I will ask patient.    Hypotension:  SBP now 90's.  Not symptomatic.  Watch volume status.  Echo LV 55%.  Monitor.      Left mandible mass: I had attempted to I&D the lesion 8/15, but no purulent material was able to be exude.  Suspect cystic in nature.  No pain or tenderness.  No surrounding erythema.  We will continue to monitor.    Hyperphosphatemia:  Down trending on sevelamer 800mg TID with meals, started 8/16/19.    Azotemia POA: yes and ESRD (end stage renal disease) on dialysis (HCC) POA: Yes.  On going HD per Nephrology.      Avascular necrosis of bones of both hips (HCC) POA: Yes.  Pain control/support.  Outpt f/u with Ortho for surgery.    Seizures (HCC) POA: Unknown.  None recently.  Cont Vimpat.    Hypertension POA: Yes.  BP controlled with HD    Anemia of CKD:  Cont iron supplement.  Epogen.      Insomnia (new):  Cont trazodone PRN at night    DVT prophylaxis: SC heparin.    CODE STATUS:  Full Code    Disposition: TBD.    Case was discussed with Charge Nurse, Medical SW, , and Pharmacist on IDTround in addition to individual(s) mentioned above.    I have performed a physical exam and reviewed and updated ROS as of today, 8/17/2019.  In review of yesterday's note dated, 8/16/2019, there are no changes except as documented above.      Martina Leigh M.D.

## 2019-08-18 LAB
ANION GAP SERPL CALC-SCNC: 16 MMOL/L (ref 0–11.9)
BACTERIA BLD CULT: ABNORMAL
BACTERIA BLD CULT: ABNORMAL
BASOPHILS # BLD AUTO: 1 % (ref 0–1.8)
BASOPHILS # BLD: 0.06 K/UL (ref 0–0.12)
BUN SERPL-MCNC: 63 MG/DL (ref 8–22)
CALCIUM SERPL-MCNC: 8.7 MG/DL (ref 8.4–10.2)
CHLORIDE SERPL-SCNC: 95 MMOL/L (ref 96–112)
CO2 SERPL-SCNC: 23 MMOL/L (ref 20–33)
CREAT SERPL-MCNC: 11.3 MG/DL (ref 0.5–1.4)
EOSINOPHIL # BLD AUTO: 0.33 K/UL (ref 0–0.51)
EOSINOPHIL NFR BLD: 5.7 % (ref 0–6.9)
ERYTHROCYTE [DISTWIDTH] IN BLOOD BY AUTOMATED COUNT: 41.1 FL (ref 35.9–50)
GLUCOSE SERPL-MCNC: 110 MG/DL (ref 65–99)
HCT VFR BLD AUTO: 31.6 % (ref 37–47)
HGB BLD-MCNC: 10.2 G/DL (ref 12–16)
IMM GRANULOCYTES # BLD AUTO: 0.1 K/UL (ref 0–0.11)
IMM GRANULOCYTES NFR BLD AUTO: 1.7 % (ref 0–0.9)
LYMPHOCYTES # BLD AUTO: 0.91 K/UL (ref 1–4.8)
LYMPHOCYTES NFR BLD: 15.8 % (ref 22–41)
MCH RBC QN AUTO: 30.6 PG (ref 27–33)
MCHC RBC AUTO-ENTMCNC: 32.3 G/DL (ref 33.6–35)
MCV RBC AUTO: 94.9 FL (ref 81.4–97.8)
MONOCYTES # BLD AUTO: 0.66 K/UL (ref 0–0.85)
MONOCYTES NFR BLD AUTO: 11.5 % (ref 0–13.4)
NEUTROPHILS # BLD AUTO: 3.7 K/UL (ref 2–7.15)
NEUTROPHILS NFR BLD: 64.3 % (ref 44–72)
NRBC # BLD AUTO: 0 K/UL
NRBC BLD-RTO: 0 /100 WBC
PHOSPHATE SERPL-MCNC: 7.3 MG/DL (ref 2.5–4.5)
PLATELET # BLD AUTO: 116 K/UL (ref 164–446)
PMV BLD AUTO: 11.4 FL (ref 9–12.9)
POTASSIUM SERPL-SCNC: 4.4 MMOL/L (ref 3.6–5.5)
RBC # BLD AUTO: 3.33 M/UL (ref 4.2–5.4)
SIGNIFICANT IND 70042: ABNORMAL
SITE SITE: ABNORMAL
SODIUM SERPL-SCNC: 134 MMOL/L (ref 135–145)
SOURCE SOURCE: ABNORMAL
WBC # BLD AUTO: 5.8 K/UL (ref 4.8–10.8)

## 2019-08-18 PROCEDURE — 700105 HCHG RX REV CODE 258: Performed by: INTERNAL MEDICINE

## 2019-08-18 PROCEDURE — 84100 ASSAY OF PHOSPHORUS: CPT

## 2019-08-18 PROCEDURE — 700111 HCHG RX REV CODE 636 W/ 250 OVERRIDE (IP): Performed by: INTERNAL MEDICINE

## 2019-08-18 PROCEDURE — 85025 COMPLETE CBC W/AUTO DIFF WBC: CPT

## 2019-08-18 PROCEDURE — A9270 NON-COVERED ITEM OR SERVICE: HCPCS | Performed by: INTERNAL MEDICINE

## 2019-08-18 PROCEDURE — 770020 HCHG ROOM/CARE - TELE (206)

## 2019-08-18 PROCEDURE — 99232 SBSQ HOSP IP/OBS MODERATE 35: CPT | Performed by: INTERNAL MEDICINE

## 2019-08-18 PROCEDURE — 700102 HCHG RX REV CODE 250 W/ 637 OVERRIDE(OP): Performed by: INTERNAL MEDICINE

## 2019-08-18 PROCEDURE — 80048 BASIC METABOLIC PNL TOTAL CA: CPT

## 2019-08-18 PROCEDURE — 99233 SBSQ HOSP IP/OBS HIGH 50: CPT | Performed by: INTERNAL MEDICINE

## 2019-08-18 PROCEDURE — 700101 HCHG RX REV CODE 250: Performed by: INTERNAL MEDICINE

## 2019-08-18 RX ORDER — SEVELAMER CARBONATE 800 MG/1
3200 TABLET, FILM COATED ORAL
Status: DISCONTINUED | OUTPATIENT
Start: 2019-08-18 | End: 2019-08-21 | Stop reason: HOSPADM

## 2019-08-18 RX ADMIN — OXYCODONE HYDROCHLORIDE 20 MG: 10 TABLET ORAL at 12:12

## 2019-08-18 RX ADMIN — SEVELAMER CARBONATE 3200 MG: 800 TABLET, FILM COATED ORAL at 11:42

## 2019-08-18 RX ADMIN — AMPICILLIN AND SULBACTAM 1.5 G: 1; .5 INJECTION, POWDER, FOR SOLUTION INTRAVENOUS at 11:42

## 2019-08-18 RX ADMIN — LACOSAMIDE 100 MG: 50 TABLET, FILM COATED ORAL at 05:26

## 2019-08-18 RX ADMIN — HYDROXYZINE HYDROCHLORIDE 10 MG: 10 TABLET, FILM COATED ORAL at 17:35

## 2019-08-18 RX ADMIN — FERROUS SULFATE TAB 325 MG (65 MG ELEMENTAL FE) 325 MG: 325 (65 FE) TAB at 05:26

## 2019-08-18 RX ADMIN — HYDROXYZINE HYDROCHLORIDE 10 MG: 10 TABLET, FILM COATED ORAL at 08:02

## 2019-08-18 RX ADMIN — OXYCODONE HYDROCHLORIDE AND ACETAMINOPHEN 500 MG: 500 TABLET ORAL at 05:25

## 2019-08-18 RX ADMIN — OXYCODONE HYDROCHLORIDE 20 MG: 10 TABLET ORAL at 17:18

## 2019-08-18 RX ADMIN — OXYCODONE HYDROCHLORIDE 20 MG: 10 TABLET ORAL at 08:03

## 2019-08-18 RX ADMIN — CHOLECALCIFEROL (VITAMIN D3) 10 MCG (400 UNIT) TABLET 400 UNITS: at 05:25

## 2019-08-18 RX ADMIN — HEPARIN SODIUM 5000 UNITS: 5000 INJECTION, SOLUTION INTRAVENOUS; SUBCUTANEOUS at 05:26

## 2019-08-18 RX ADMIN — SEVELAMER CARBONATE 800 MG: 800 TABLET, FILM COATED ORAL at 08:02

## 2019-08-18 RX ADMIN — AMPICILLIN AND SULBACTAM 1.5 G: 1; .5 INJECTION, POWDER, FOR SOLUTION INTRAVENOUS at 22:33

## 2019-08-18 RX ADMIN — SEVELAMER CARBONATE 3200 MG: 800 TABLET, FILM COATED ORAL at 17:19

## 2019-08-18 RX ADMIN — LACOSAMIDE 100 MG: 50 TABLET, FILM COATED ORAL at 17:18

## 2019-08-18 RX ADMIN — OMEPRAZOLE 20 MG: 20 CAPSULE, DELAYED RELEASE ORAL at 05:26

## 2019-08-18 RX ADMIN — HYDROXYZINE HYDROCHLORIDE 10 MG: 10 TABLET, FILM COATED ORAL at 21:53

## 2019-08-18 RX ADMIN — OXYCODONE HYDROCHLORIDE 20 MG: 10 TABLET ORAL at 21:53

## 2019-08-18 ASSESSMENT — ENCOUNTER SYMPTOMS
ABDOMINAL PAIN: 0
CHILLS: 0
PALPITATIONS: 0
NAUSEA: 0
CONSTIPATION: 0
FEVER: 0
FOCAL WEAKNESS: 0
VOMITING: 0
SHORTNESS OF BREATH: 0
DIARRHEA: 0

## 2019-08-18 NOTE — PROGRESS NOTES
Telemetry Shift Summary    Rhythm SR  HR Range 70-90s  Ectopy none  Measurements 0.14/0.08/0.36        Normal Values  Rhythm SR  HR Range    Measurements 0.12-0.20 / 0.06-0.10  / 0.30-0.52

## 2019-08-18 NOTE — PROGRESS NOTES
Assessment completed, pt A&Ox4, pt reports 7/10 back and hip pain, scheduled oxy given. Pt c/o itching  put in orders for atarax, given as ordered. No other needs at this time.

## 2019-08-18 NOTE — CARE PLAN
Problem: Venous Thromboembolism (VTW)/Deep Vein Thrombosis (DVT) Prevention:  Goal: Patient will participate in Venous Thrombosis (VTE)/Deep Vein Thrombosis (DVT)Prevention Measures  Outcome: PROGRESSING AS EXPECTED  Note:   Pt receiving subq heparin as ordered     Problem: Pain Management  Goal: Pain level will decrease to patient's comfort goal  Outcome: PROGRESSING AS EXPECTED  Note:   Pts pain managed with scheduled oxy and lidocaine patch.

## 2019-08-18 NOTE — CARE PLAN
Problem: Communication  Goal: The ability to communicate needs accurately and effectively will improve  Outcome: PROGRESSING AS EXPECTED  Note:   A&Ox4, pleasant and cooperative. Able to make needs known.      Problem: Safety  Goal: Will remain free from falls  Outcome: PROGRESSING AS EXPECTED  Intervention: Implement fall precautions  Note:   SBA and walker in room, steady gait. Seizure and fall precautions in place.      Problem: Infection  Goal: Will remain free from infection  Description  Labs monitored pt afebrile at this time.    Outcome: PROGRESSING AS EXPECTED  Note:   ABX per orders. Afebrile, VS WNL. Remains >92% on room air. NSR on tele.      Problem: Venous Thromboembolism (VTW)/Deep Vein Thrombosis (DVT) Prevention:  Goal: Patient will participate in Venous Thrombosis (VTE)/Deep Vein Thrombosis (DVT)Prevention Measures  Outcome: PROGRESSING AS EXPECTED  Note:   SCDs on while in bed, SQ heparin.      Problem: Bowel/Gastric:  Goal: Normal bowel function is maintained or improved  Outcome: PROGRESSING AS EXPECTED  Note:   LBM: 8/18. No nausea reported. Good appetite today.      Problem: Pain Management  Goal: Pain level will decrease to patient's comfort goal  Outcome: PROGRESSING AS EXPECTED  Note:   Chronic back pain controlled with scheduled oxy. Pt refused lidocaine patch today due to itching. PRN Atarax given and pt reports relief from itching.

## 2019-08-18 NOTE — PROGRESS NOTES
Nephrology Daily Progress Note    Date of Service  8/18/2019    Chief Complaint  38 y.o. female with ESRD on HD MWF admitted 8/12/2019 with twitching, missed dialysis    Interval Problem Update  8/13 - Patient got HD last night with 2.7L UF and HD again this morning with 3L UF. Seen this afternoon, remains somewhat confused. Denies SOB. Denies chest pain.   8/14 -patient had dialysis #2 yesterday with 3 L removed.  Patient had dialysis this morning, complicated by hypotension, only able to remove 1 L.  Patient seen this afternoon, says she is feeling much better.  Denies chest pain, shortness of breath.  8/15 -no new complaints today.  Patient denies shortness of breath, chest pain.  8/18 - no new complaints today. Denies SOB, chest pain.      Review of Systems  Review of Systems   Constitutional: Negative for fever.   Respiratory: Negative for shortness of breath.    Cardiovascular: Negative for chest pain.   Gastrointestinal: Negative for abdominal pain.   All other systems reviewed and are negative.       Physical Exam  Temp:  [36.2 °C (97.1 °F)-36.7 °C (98 °F)] 36.6 °C (97.8 °F)  Pulse:  [66-96] 69  Resp:  [16-18] 17  BP: ()/(51-66) 127/66  SpO2:  [90 %-100 %] 90 %    Physical Exam   Constitutional: She is oriented to person, place, and time. She appears well-developed. No distress.   Eyes: EOM are normal. No scleral icterus.   Neck: No tracheal deviation present.   Cardiovascular: Normal heart sounds.   No murmur heard.  Pulmonary/Chest: Effort normal. No stridor. She has no rales.   Abdominal: Soft. There is no tenderness.   Musculoskeletal: Normal range of motion. She exhibits no edema.   Neurological: She is alert and oriented to person, place, and time.   Skin: Skin is warm and dry.   Psychiatric: She has a normal mood and affect.   Access: Right upper extremity AV graft with a patent bruit and thrill      Fluids    Intake/Output Summary (Last 24 hours) at 8/18/2019 1242  Last data filed at 8/17/2019  2239  Gross per 24 hour   Intake 400 ml   Output --   Net 400 ml       Laboratory  Labs reviewed, pertinent labs below.  Recent Labs     08/16/19 0258 08/17/19 0130 08/18/19  0555   WBC 8.9 6.8 5.8   RBC 3.84* 3.35* 3.33*   HEMOGLOBIN 11.7* 10.3* 10.2*   HEMATOCRIT 36.3* 32.1* 31.6*   MCV 94.5 95.8 94.9   MCH 30.5 30.7 30.6   MCHC 32.2* 32.1* 32.3*   RDW 42.6 42.2 41.1   PLATELETCT 151* 108* 116*   MPV 11.6 11.5 11.4     Recent Labs     08/16/19 0258 08/17/19 0130 08/18/19  0555   SODIUM 134* 132* 134*   POTASSIUM 4.7 4.0 4.4   CHLORIDE 89* 91* 95*   CO2 23 26 23   GLUCOSE 100* 96 110*   BUN 87* 41* 63*   CREATININE 13.45* 8.33* 11.30*   CALCIUM 9.5 9.2 8.7               URINALYSIS:  Lab Results   Component Value Date/Time    COLORURINE Yellow 08/12/2019 1459    CLARITY Clear 08/12/2019 1459    SPECGRAVITY 1.020 08/12/2019 1459    PHURINE 7.5 08/12/2019 1459    KETONES 15 (A) 08/12/2019 1459    PROTEINURIN >=300 (A) 08/12/2019 1459    BILIRUBINUR Negative 08/12/2019 1459    UROBILU 0.2 01/01/2019 0705    NITRITE Negative 08/12/2019 1459    LEUKESTERAS Negative 08/12/2019 1459    OCCULTBLOOD Small (A) 08/12/2019 1459     UPC  Lab Results   Component Value Date/Time    TOTPROTUR 65.0 (H) 01/21/2015 1520      Lab Results   Component Value Date/Time    CREATININEU 65.60 01/21/2015 1520         Imaging reviewed  MR-BRAIN-W/O   Final Result         1.  Mild periventricular and juxtacortical white matter changes consistent with chronic microvascular ischemic change, gliosis, or less likely demyelination. No change from previous exam.      CT-CHEST (THORAX) W/O   Final Result         1. Limited evaluation due to lack of contrast. The pulmonary artery cannot be evaluated.      Diffuse groundglass opacities throughout both lungs, most in the left upper lobe. This is nonspecific and could relate to pulmonary edema or atypical infection. Septic emboli is less likely based on this appearance.      2. Left chest port with tip  in the right atrium.      EC-ECHOCARDIOGRAM LTD W/O CONT   Final Result      EC-ECHOCARDIOGRAM COMPLETE W/O CONT   Final Result      CT-HEAD W/O   Final Result      1.  Head CT without contrast within normal limits. No evidence of acute cerebral infarction, hemorrhage or mass lesion.      DX-CHEST-PORTABLE (1 VIEW)   Final Result      1.  Interstitial edema.      2.  Cardiomegaly.            Current Facility-Administered Medications   Medication Dose Route Frequency Provider Last Rate Last Dose   • sevelamer carbonate (RENVELA) tablet 3,200 mg  3,200 mg Oral TID WITH MEALS Martina Leigh M.D.   3,200 mg at 08/18/19 1142   • heparin pf injection 300-500 Units  300-500 Units Intracatheter PRN Martina Leigh M.D.   500 Units at 08/17/19 0120   • hydrOXYzine HCl (ATARAX) tablet 10 mg  10 mg Oral TID PRN Norma Mcguire M.D.   10 mg at 08/18/19 0802   • MD Alert...Vancomycin per Pharmacy   Other PHARMACY TO DOSE Martina Leigh M.D.       • ampicillin/sulbactam (UNASYN) 1.5 g in  mL IVPB  1.5 g Intravenous Q12HRS Martina Leigh M.D.   Stopped at 08/18/19 1212   • calcium carbonate (TUMS) chewable tab 500 mg  500 mg Oral Q8HRS PRN Martina Leigh M.D.   500 mg at 08/17/19 1029   • heparin injection 5,000 Units  5,000 Units Subcutaneous Q12HRS Martina Leigh M.D.   5,000 Units at 08/18/19 0526   • traZODone (DESYREL) tablet 50 mg  50 mg Oral QHS PRN Martina Leigh M.D.   50 mg at 08/17/19 2145   • ondansetron (ZOFRAN) syringe/vial injection 4 mg  4 mg Intravenous Q4HRS PRN Cornel Little M.D.   4 mg at 08/17/19 0807   • oxyCODONE immediate release (ROXICODONE) tablet 20 mg  20 mg Oral 4X/DAY Cornel Little M.D.   20 mg at 08/18/19 1212   • LORazepam (ATIVAN) injection 0.5 mg  0.5 mg Intravenous Q4HRS PRN Orlin Louis D.O.       • amLODIPine (NORVASC) tablet 10 mg  10 mg Oral QDAY PRN Cornel Little M.D.   10 mg at 08/14/19 0126   • ascorbic acid tablet 500 mg  500 mg Oral DAILY Cornel Little,  M.D.   500 mg at 08/18/19 0525   • cholecalciferol (VITAMIN D3) tablet 400 Units  400 Units Oral DAILY Cornel Little M.D.   400 Units at 08/18/19 0525   • docusate sodium (COLACE) capsule 100 mg  100 mg Oral DAILY Cornel Little M.D.   100 mg at 08/17/19 0505   • ferrous sulfate tablet 325 mg  325 mg Oral DAILY Cornel Little M.D.   325 mg at 08/18/19 0526   • lacosamide (VIMPAT) tablet 100 mg  100 mg Oral BID Cornel Little M.D.   100 mg at 08/18/19 0526   • lidocaine (LIDODERM) 5 % 1 Patch  1 Patch Transdermal Q24HR Cornel Little M.D.   Stopped at 08/18/19 0803   • omeprazole (PRILOSEC) capsule 20 mg  20 mg Oral DAILY Cornel Little M.D.   20 mg at 08/18/19 0526   • NS (BOLUS) infusion 250 mL  250 mL Intravenous DIALYSIS PRN Bora Douglas M.D.       • albumin human 25% solution 12.5 g  12.5 g Intravenous DIALYSIS PRN Bora Douglas M.D. 150 mL/hr at 08/16/19 1452 12.5 g at 08/16/19 1452   • lidocaine (XYLOCAINE) 1 % injection 1 mL  1 mL Intradermal PRN Bora Douglas M.D.       • epoetin rk (EPOGEN,PROCRIT) injection 2,000 Units  2,000 Units Subcutaneous MO, WE + FR Bora Douglas M.D.   2,000 Units at 08/16/19 1453    And   • epoetin rk (EPOGEN,PROCRIT) injection 2,000 Units  2,000 Units Subcutaneous MO, WE + FR Bora Douglas M.D.   2,000 Units at 08/16/19 1453   • senna-docusate (PERICOLACE or SENOKOT S) 8.6-50 MG per tablet 2 Tab  2 Tab Oral BID Cornel Little M.D.   2 Tab at 08/17/19 1724    And   • polyethylene glycol/lytes (MIRALAX) PACKET 1 Packet  1 Packet Oral QDAY PRCAMI Little M.D.        And   • magnesium hydroxide (MILK OF MAGNESIA) suspension 30 mL  30 mL Oral QDAY PRCAMI Little M.D.        And   • bisacodyl (DULCOLAX) suppository 10 mg  10 mg Rectal QDAY PRCAMI Little M.D.       • enalaprilat (VASOTEC) injection 1.25 mg  1.25 mg Intravenous Q6HRS PRCAMI Little M.D.   1.25 mg at 08/14/19 0404   • hydrALAZINE  (APRESOLINE) injection 20 mg  20 mg Intravenous Q6HRS PRN Cornel Little M.D.   20 mg at 08/14/19 0115         Assessment/Plan  38 y.o. Female with ESRD on HD who presented 8/12/2019 with twitching, missed HD.      1. ESRD on HD MWF. Oliguric, stable. No need for HD today. Plan for HD Monday Wednesday Friday. Avoid nephrotoxins.   Access: RUE B-Ax AVG.     2. Urmeia, resolved with HD. Continue thrice weekly HD.      3. Accelerated HTN, resolved with HD and UF. Continue UF as tolerated with HD.     4. Anemia of CKD.  Stable. Hold epogen with hgb over 10. Check CBC at least 3 times weekly.    5. CoNS bacteremia with TV vegetation. Possible contaminant, but needs better imaging of TV. Further workup per cardiology, possible GAYATHRI.     Discussed with Dr. Reese Douglas MD  Nephrology

## 2019-08-18 NOTE — PROGRESS NOTES
Pt up with SBA and walker in room, mild SOB with exertion. Maintain >92% on room air. Chronic back and L hip pain well controlled with scheduled Oxy, BP >100 systolic today. Renal diet, denies stomach ache today. + BM today, flatus. Awaiting BC results. Cyst by L ear began draining scant amounts of serosang drainage today, band aid applied. Call light with in reach, able to make needs known.

## 2019-08-18 NOTE — PROGRESS NOTES
Bedside report received from Nelia FINCH. Assumed care. POC discussed. Pt resting comfortably in bed. Safety precautions in place.

## 2019-08-18 NOTE — PROGRESS NOTES
"Pharmacy Kinetics 38 y.o. female on vancomycin day # 2 2019    Currently on Vancomycin 2000 mg iv 19 0207  Provider specified end date: to be determined    Indication for Treatment: Endocarditis     Pertinent history per medical record: Admitted on 2019 for azotemia due to missing hemodialysis sessions.  ECHO performed due to syncope suspicious for tricuspid valve vegetation; blood culture from line growing possible Staph.  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), chronic pain with opioid dependence, HTN, seizures, hx ESBL     Other antibiotics: Unasyn 1.5 gm IV Q12H     Allergies: Lorazepam [ativan]; Morphine; and Seasonal      List concerns for renal function: ESRD; hemodialysis 3x week     Pertinent cultures to date:   19 UC NGTD  19 BCp x 2 NGTD  8/15/19 BCp NGTD  19 BC Line possible Staph (Neg MSSA/MRSA per PCR - MD aware)     MRSA nares swab if pneumonia is a concern (ordered/positive/negative/n-a): NA    Recent Labs     19  0258 19  0130 19  0555   WBC 8.9 6.8 5.8   NEUTSPOLYS 73.50* 69.10 64.30     Recent Labs     19  0258 19  0130 19  0555   BUN 87* 41* 63*   CREATININE 13.45* 8.33* 11.30*     No results for input(s): VANCOTROUGH, VANCOPEAK, VANCORANDOM in the last 72 hours.    Intake/Output Summary (Last 24 hours) at 2019 1454  Last data filed at 2019 1300  Gross per 24 hour   Intake 640 ml   Output --   Net 640 ml      /66   Pulse 69   Temp 36.6 °C (97.8 °F) (Oral)   Resp 17   Ht 1.651 m (5' 5\")   Wt 81.6 kg (179 lb 14.3 oz)   SpO2 90%  Temp (24hrs), Av.5 °C (97.7 °F), Min:36.2 °C (97.1 °F), Max:36.7 °C (98 °F)      A/P   1. Next vancomycin level: after hemodialysis treatment 19  2. Goal trough: 16 - 20 mcg/ml  3. Comments: Plan to check vancomycin trough after hemodialysis tomorrow.  Anticipate twice weekly dosing depending on hemodialysis schedule.  Will monitor daily per protocol.    ESTEFANY Barcenas PharmD    "

## 2019-08-18 NOTE — PROGRESS NOTES
Pt up with SBA and walker in room, mild SOB with exertion. Weaned off O2, >92% on room air. CT and MRI completed today. Chronic back and L hip pain well controlled with scheduled Oxy, BP >100 systolic today. Renal diet, intermittent nausea, zofran and TUMS given x1. LBM: 8/14, senna given with much encouragement, + flatus. Call light with in reach, able to make needs known.

## 2019-08-18 NOTE — PROGRESS NOTES
Bedside report given to Nelia FINCH. POC discussed. Pt resting comfortably in bed. Safety precautions in place.

## 2019-08-18 NOTE — PROGRESS NOTES
Telemetry Shift Summary     Rhythm SR  HR Range 74-93  Ectopy n/a  Measurements 0.18/0.08/0.42           Normal Values  Rhythm SR  HR Range    Measurements 0.12-0.20 / 0.06-0.10  / 0.30-0.52

## 2019-08-19 LAB
ANION GAP SERPL CALC-SCNC: 17 MMOL/L (ref 0–11.9)
BASOPHILS # BLD AUTO: 0.8 % (ref 0–1.8)
BASOPHILS # BLD: 0.06 K/UL (ref 0–0.12)
BUN SERPL-MCNC: 72 MG/DL (ref 8–22)
CALCIUM SERPL-MCNC: 8.8 MG/DL (ref 8.4–10.2)
CHLORIDE SERPL-SCNC: 94 MMOL/L (ref 96–112)
CO2 SERPL-SCNC: 23 MMOL/L (ref 20–33)
CREAT SERPL-MCNC: 13.31 MG/DL (ref 0.5–1.4)
EOSINOPHIL # BLD AUTO: 0.32 K/UL (ref 0–0.51)
EOSINOPHIL NFR BLD: 4.5 % (ref 0–6.9)
ERYTHROCYTE [DISTWIDTH] IN BLOOD BY AUTOMATED COUNT: 39.9 FL (ref 35.9–50)
GLUCOSE SERPL-MCNC: 98 MG/DL (ref 65–99)
HCT VFR BLD AUTO: 30.3 % (ref 37–47)
HGB BLD-MCNC: 10 G/DL (ref 12–16)
IMM GRANULOCYTES # BLD AUTO: 0.09 K/UL (ref 0–0.11)
IMM GRANULOCYTES NFR BLD AUTO: 1.3 % (ref 0–0.9)
LYMPHOCYTES # BLD AUTO: 1.08 K/UL (ref 1–4.8)
LYMPHOCYTES NFR BLD: 15.3 % (ref 22–41)
MCH RBC QN AUTO: 31 PG (ref 27–33)
MCHC RBC AUTO-ENTMCNC: 33 G/DL (ref 33.6–35)
MCV RBC AUTO: 93.8 FL (ref 81.4–97.8)
MONOCYTES # BLD AUTO: 0.75 K/UL (ref 0–0.85)
MONOCYTES NFR BLD AUTO: 10.6 % (ref 0–13.4)
NEUTROPHILS # BLD AUTO: 4.78 K/UL (ref 2–7.15)
NEUTROPHILS NFR BLD: 67.5 % (ref 44–72)
NRBC # BLD AUTO: 0 K/UL
NRBC BLD-RTO: 0 /100 WBC
NT-PROBNP SERPL IA-MCNC: ABNORMAL PG/ML (ref 0–125)
PHOSPHATE SERPL-MCNC: 6.3 MG/DL (ref 2.5–4.5)
PLATELET # BLD AUTO: 139 K/UL (ref 164–446)
PMV BLD AUTO: 11 FL (ref 9–12.9)
POTASSIUM SERPL-SCNC: 4.8 MMOL/L (ref 3.6–5.5)
RBC # BLD AUTO: 3.23 M/UL (ref 4.2–5.4)
SODIUM SERPL-SCNC: 134 MMOL/L (ref 135–145)
WBC # BLD AUTO: 7.1 K/UL (ref 4.8–10.8)

## 2019-08-19 PROCEDURE — 90935 HEMODIALYSIS ONE EVALUATION: CPT | Performed by: INTERNAL MEDICINE

## 2019-08-19 PROCEDURE — 97165 OT EVAL LOW COMPLEX 30 MIN: CPT

## 2019-08-19 PROCEDURE — 84100 ASSAY OF PHOSPHORUS: CPT

## 2019-08-19 PROCEDURE — A9270 NON-COVERED ITEM OR SERVICE: HCPCS | Performed by: INTERNAL MEDICINE

## 2019-08-19 PROCEDURE — 99232 SBSQ HOSP IP/OBS MODERATE 35: CPT | Performed by: INTERNAL MEDICINE

## 2019-08-19 PROCEDURE — 90935 HEMODIALYSIS ONE EVALUATION: CPT

## 2019-08-19 PROCEDURE — 700102 HCHG RX REV CODE 250 W/ 637 OVERRIDE(OP): Performed by: INTERNAL MEDICINE

## 2019-08-19 PROCEDURE — 83880 ASSAY OF NATRIURETIC PEPTIDE: CPT

## 2019-08-19 PROCEDURE — 700101 HCHG RX REV CODE 250: Performed by: INTERNAL MEDICINE

## 2019-08-19 PROCEDURE — 770020 HCHG ROOM/CARE - TELE (206)

## 2019-08-19 PROCEDURE — 700111 HCHG RX REV CODE 636 W/ 250 OVERRIDE (IP): Performed by: INTERNAL MEDICINE

## 2019-08-19 PROCEDURE — 97161 PT EVAL LOW COMPLEX 20 MIN: CPT

## 2019-08-19 PROCEDURE — 700105 HCHG RX REV CODE 258: Performed by: INTERNAL MEDICINE

## 2019-08-19 PROCEDURE — 80048 BASIC METABOLIC PNL TOTAL CA: CPT

## 2019-08-19 PROCEDURE — 85025 COMPLETE CBC W/AUTO DIFF WBC: CPT

## 2019-08-19 RX ORDER — LACOSAMIDE 50 MG/1
50 TABLET ORAL ONCE
Status: COMPLETED | OUTPATIENT
Start: 2019-08-19 | End: 2019-08-19

## 2019-08-19 RX ORDER — PREGABALIN 75 MG/1
75 CAPSULE ORAL 3 TIMES DAILY
Status: DISCONTINUED | OUTPATIENT
Start: 2019-08-19 | End: 2019-08-21 | Stop reason: HOSPADM

## 2019-08-19 RX ORDER — HYDROXYCHLOROQUINE SULFATE 200 MG/1
200 TABLET, FILM COATED ORAL DAILY
Status: DISCONTINUED | OUTPATIENT
Start: 2019-08-20 | End: 2019-08-21 | Stop reason: HOSPADM

## 2019-08-19 RX ORDER — OXYCODONE HYDROCHLORIDE 10 MG/1
10 TABLET ORAL ONCE
Status: COMPLETED | OUTPATIENT
Start: 2019-08-19 | End: 2019-08-19

## 2019-08-19 RX ADMIN — HEPARIN SODIUM 5000 UNITS: 5000 INJECTION, SOLUTION INTRAVENOUS; SUBCUTANEOUS at 18:41

## 2019-08-19 RX ADMIN — PREGABALIN 75 MG: 75 CAPSULE ORAL at 18:18

## 2019-08-19 RX ADMIN — EPOETIN ALFA 2000 UNITS: 2000 SOLUTION INTRAVENOUS; SUBCUTANEOUS at 12:30

## 2019-08-19 RX ADMIN — SENNOSIDES AND DOCUSATE SODIUM 2 TABLET: 8.6; 5 TABLET ORAL at 18:39

## 2019-08-19 RX ADMIN — AMPICILLIN AND SULBACTAM 1.5 G: 1; .5 INJECTION, POWDER, FOR SOLUTION INTRAVENOUS at 13:51

## 2019-08-19 RX ADMIN — SEVELAMER CARBONATE 3200 MG: 800 TABLET, FILM COATED ORAL at 18:40

## 2019-08-19 RX ADMIN — HYDROXYZINE HYDROCHLORIDE 10 MG: 10 TABLET, FILM COATED ORAL at 22:37

## 2019-08-19 RX ADMIN — TRAZODONE HYDROCHLORIDE 50 MG: 50 TABLET ORAL at 22:37

## 2019-08-19 RX ADMIN — OXYCODONE HYDROCHLORIDE 10 MG: 10 TABLET ORAL at 18:37

## 2019-08-19 RX ADMIN — LIDOCAINE 1 PATCH: 50 PATCH TOPICAL at 13:53

## 2019-08-19 RX ADMIN — SEVELAMER CARBONATE 3200 MG: 800 TABLET, FILM COATED ORAL at 13:48

## 2019-08-19 RX ADMIN — ONDANSETRON HYDROCHLORIDE 4 MG: 2 INJECTION, SOLUTION INTRAMUSCULAR; INTRAVENOUS at 03:06

## 2019-08-19 RX ADMIN — OXYCODONE HYDROCHLORIDE 20 MG: 10 TABLET ORAL at 18:19

## 2019-08-19 RX ADMIN — TRAZODONE HYDROCHLORIDE 50 MG: 50 TABLET ORAL at 00:29

## 2019-08-19 RX ADMIN — AMPICILLIN AND SULBACTAM 1.5 G: 1; .5 INJECTION, POWDER, FOR SOLUTION INTRAVENOUS at 22:37

## 2019-08-19 RX ADMIN — OXYCODONE HYDROCHLORIDE 20 MG: 10 TABLET ORAL at 22:37

## 2019-08-19 RX ADMIN — LACOSAMIDE 100 MG: 50 TABLET, FILM COATED ORAL at 18:19

## 2019-08-19 RX ADMIN — OXYCODONE HYDROCHLORIDE 20 MG: 10 TABLET ORAL at 13:47

## 2019-08-19 RX ADMIN — LACOSAMIDE 50 MG: 50 TABLET, FILM COATED ORAL at 18:38

## 2019-08-19 ASSESSMENT — GAIT ASSESSMENTS
ASSISTIVE DEVICE: FRONT WHEEL WALKER
DISTANCE (FEET): 150
GAIT LEVEL OF ASSIST: SUPERVISED
DEVIATION: ANTALGIC

## 2019-08-19 ASSESSMENT — COGNITIVE AND FUNCTIONAL STATUS - GENERAL
SUGGESTED CMS G CODE MODIFIER DAILY ACTIVITY: CJ
DAILY ACTIVITIY SCORE: 21
TOILETING: A LITTLE
HELP NEEDED FOR BATHING: A LITTLE
DRESSING REGULAR LOWER BODY CLOTHING: A LITTLE

## 2019-08-19 ASSESSMENT — ENCOUNTER SYMPTOMS
PALPITATIONS: 0
WEIGHT LOSS: 0
NAUSEA: 0
SINUS PAIN: 0
COUGH: 0
DIARRHEA: 0
WHEEZING: 0
BACK PAIN: 1
VOMITING: 0
MYALGIAS: 0
FEVER: 0
SHORTNESS OF BREATH: 0
FOCAL WEAKNESS: 0
CONSTIPATION: 0
ORTHOPNEA: 0
HEMOPTYSIS: 0
EYES NEGATIVE: 1
CHILLS: 0
ABDOMINAL PAIN: 0

## 2019-08-19 ASSESSMENT — ACTIVITIES OF DAILY LIVING (ADL): TOILETING: INDEPENDENT

## 2019-08-19 NOTE — PROGRESS NOTES
Nephrology Daily Progress Note    Date of Service  8/19/2019    Chief Complaint  38 y.o. female with ESRD on HD MWF admitted 8/12/2019 with twitching, missed dialysis    Interval Problem Update  8/13 - Patient got HD last night with 2.7L UF and HD again this morning with 3L UF. Seen this afternoon, remains somewhat confused. Denies SOB. Denies chest pain.   8/14 -patient had dialysis #2 yesterday with 3 L removed.  Patient had dialysis this morning, complicated by hypotension, only able to remove 1 L.  Patient seen this afternoon, says she is feeling much better.  Denies chest pain, shortness of breath.  8/15 -no new complaints today.  Patient denies shortness of breath, chest pain.  8/18 - no new complaints today. Denies SOB, chest pain.  8/19- patient seen and examined during dialysis -tolerates well           Scheduled for GAYATHRI tomorrow    Review of Systems  Review of Systems   Constitutional: Negative for chills, fever, malaise/fatigue and weight loss.   HENT: Negative for congestion, hearing loss and sinus pain.    Eyes: Negative.    Respiratory: Negative for cough, hemoptysis, shortness of breath and wheezing.    Cardiovascular: Negative for chest pain, palpitations, orthopnea and leg swelling.   Gastrointestinal: Negative for abdominal pain, nausea and vomiting.   Musculoskeletal: Positive for back pain and joint pain. Negative for myalgias.   Skin: Negative.    All other systems reviewed and are negative.       Physical Exam  Temp:  [36.5 °C (97.7 °F)-36.8 °C (98.3 °F)] 36.5 °C (97.7 °F)  Pulse:  [71-82] 78  Resp:  [14-17] 16  BP: (101-146)/(66-81) 126/79  SpO2:  [93 %-98 %] 96 %    Physical Exam   Constitutional: She is oriented to person, place, and time. She appears well-developed and well-nourished. No distress.   HENT:   Head: Normocephalic and atraumatic.   Nose: Nose normal.   Mouth/Throat: Oropharynx is clear and moist.   Eyes: Pupils are equal, round, and reactive to light. Conjunctivae and EOM are  normal.   Neck: Normal range of motion. Neck supple. No thyromegaly present.   Cardiovascular: Normal rate and regular rhythm. Exam reveals no gallop and no friction rub.   Pulmonary/Chest: Effort normal and breath sounds normal. No respiratory distress. She has no wheezes. She has no rales.   Abdominal: Soft. Bowel sounds are normal. She exhibits no distension and no mass. There is no tenderness. There is no guarding.   Musculoskeletal: She exhibits no edema.   Neurological: She is alert and oriented to person, place, and time. No cranial nerve deficit.   Skin: Skin is warm. No rash noted. No erythema.   Nursing note and vitals reviewed.  Access: Right upper extremity AV graft with a patent bruit and thrill      Fluids    Intake/Output Summary (Last 24 hours) at 8/19/2019 1404  Last data filed at 8/19/2019 1300  Gross per 24 hour   Intake 500 ml   Output 1329 ml   Net -829 ml       Laboratory  Labs reviewed, pertinent labs below.  Recent Labs     08/17/19 0130 08/18/19  0555 08/19/19  0310   WBC 6.8 5.8 7.1   RBC 3.35* 3.33* 3.23*   HEMOGLOBIN 10.3* 10.2* 10.0*   HEMATOCRIT 32.1* 31.6* 30.3*   MCV 95.8 94.9 93.8   MCH 30.7 30.6 31.0   MCHC 32.1* 32.3* 33.0*   RDW 42.2 41.1 39.9   PLATELETCT 108* 116* 139*   MPV 11.5 11.4 11.0     Recent Labs     08/17/19  0130 08/18/19  0555 08/19/19  0310   SODIUM 132* 134* 134*   POTASSIUM 4.0 4.4 4.8   CHLORIDE 91* 95* 94*   CO2 26 23 23   GLUCOSE 96 110* 98   BUN 41* 63* 72*   CREATININE 8.33* 11.30* 13.31*   CALCIUM 9.2 8.7 8.8               URINALYSIS:  Lab Results   Component Value Date/Time    COLORURINE Yellow 08/12/2019 1459    CLARITY Clear 08/12/2019 1459    SPECGRAVITY 1.020 08/12/2019 1459    PHURINE 7.5 08/12/2019 1459    KETONES 15 (A) 08/12/2019 1459    PROTEINURIN >=300 (A) 08/12/2019 1459    BILIRUBINUR Negative 08/12/2019 1459    UROBILU 0.2 01/01/2019 0705    NITRITE Negative 08/12/2019 1459    LEUKESTERAS Negative 08/12/2019 1459    OCCULTBLOOD Small (A)  08/12/2019 1459     [facility]  Lab Results   Component Value Date/Time    TOTPROTUR 65.0 (H) 01/21/2015 1520      Lab Results   Component Value Date/Time    CREATININEU 65.60 01/21/2015 1520         Imaging reviewed  MR-BRAIN-W/O   Final Result         1.  Mild periventricular and juxtacortical white matter changes consistent with chronic microvascular ischemic change, gliosis, or less likely demyelination. No change from previous exam.      CT-CHEST (THORAX) W/O   Final Result         1. Limited evaluation due to lack of contrast. The pulmonary artery cannot be evaluated.      Diffuse groundglass opacities throughout both lungs, most in the left upper lobe. This is nonspecific and could relate to pulmonary edema or atypical infection. Septic emboli is less likely based on this appearance.      2. Left chest port with tip in the right atrium.      EC-ECHOCARDIOGRAM LTD W/O CONT   Final Result      EC-ECHOCARDIOGRAM COMPLETE W/O CONT   Final Result      CT-HEAD W/O   Final Result      1.  Head CT without contrast within normal limits. No evidence of acute cerebral infarction, hemorrhage or mass lesion.      DX-CHEST-PORTABLE (1 VIEW)   Final Result      1.  Interstitial edema.      2.  Cardiomegaly.            Current Facility-Administered Medications   Medication Dose Route Frequency Provider Last Rate Last Dose   • [START ON 8/21/2019] epoetin rk (EPOGEN,PROCRIT) injection 2,000 Units  2,000 Units Intravenous MO, WE + FR Leana Shaver M.D.   2,000 Units at 08/19/19 1230    And   • [START ON 8/21/2019] epoetin rk (EPOGEN,PROCRIT) injection 2,000 Units  2,000 Units Intravenous MO, WE + FR Leana Shaver M.D.   2,000 Units at 08/19/19 1230   • sevelamer carbonate (RENVELA) tablet 3,200 mg  3,200 mg Oral TID WITH MEALS Martina Leigh M.D.   3,200 mg at 08/19/19 1348   • heparin pf injection 300-500 Units  300-500 Units Intracatheter PRN Martina Leigh M.D.   500 Units at 08/17/19 0120   • hydrOXYzine HCl (ATARAX) tablet 10 mg   10 mg Oral TID PRN Norma Mcguire M.D.   10 mg at 08/18/19 2153   • ampicillin/sulbactam (UNASYN) 1.5 g in  mL IVPB  1.5 g Intravenous Q12HRS Martina Leigh M.D. 200 mL/hr at 08/19/19 1351 1.5 g at 08/19/19 1351   • calcium carbonate (TUMS) chewable tab 500 mg  500 mg Oral Q8HRS PRN Martina Leigh M.D.   500 mg at 08/17/19 1029   • heparin injection 5,000 Units  5,000 Units Subcutaneous Q12HRS Martina Leigh M.D.   5,000 Units at 08/18/19 0526   • traZODone (DESYREL) tablet 50 mg  50 mg Oral QHS PRN Martina Leigh M.D.   50 mg at 08/19/19 0029   • ondansetron (ZOFRAN) syringe/vial injection 4 mg  4 mg Intravenous Q4HRS PRN Cornel Little M.D.   4 mg at 08/19/19 0306   • oxyCODONE immediate release (ROXICODONE) tablet 20 mg  20 mg Oral 4X/DAY Cornel Little M.D.   20 mg at 08/19/19 1347   • LORazepam (ATIVAN) injection 0.5 mg  0.5 mg Intravenous Q4HRS PRN Orlin Louis D.O.       • amLODIPine (NORVASC) tablet 10 mg  10 mg Oral QDAY PRN Cornel Little M.D.   10 mg at 08/14/19 0126   • ascorbic acid tablet 500 mg  500 mg Oral DAILY Cornel Little M.D.   Stopped at 08/19/19 0600   • cholecalciferol (VITAMIN D3) tablet 400 Units  400 Units Oral DAILY Cornel Little M.D.   Stopped at 08/19/19 0600   • docusate sodium (COLACE) capsule 100 mg  100 mg Oral DAILY Cornel Little M.D.   Stopped at 08/19/19 0600   • ferrous sulfate tablet 325 mg  325 mg Oral DAILY Cornel Little M.D.   Stopped at 08/19/19 0600   • lacosamide (VIMPAT) tablet 100 mg  100 mg Oral BID Cornel Little M.D.   Stopped at 08/19/19 0600   • lidocaine (LIDODERM) 5 % 1 Patch  1 Patch Transdermal Q24HR Cornel Little M.D.   1 Patch at 08/19/19 1353   • omeprazole (PRILOSEC) capsule 20 mg  20 mg Oral DAILY Cornel Little M.D.   Stopped at 08/19/19 0600   • NS (BOLUS) infusion 250 mL  250 mL Intravenous DIALYSIS PRN Bora Douglas M.D.       • albumin human 25% solution 12.5 g  12.5 g  Intravenous DIALYSIS PRN Bora Douglas M.D. 150 mL/hr at 08/16/19 1452 12.5 g at 08/16/19 1452   • lidocaine (XYLOCAINE) 1 % injection 1 mL  1 mL Intradermal PRN Bora Douglas M.D.       • senna-docusate (PERICOLACE or SENOKOT S) 8.6-50 MG per tablet 2 Tab  2 Tab Oral BID Cornel Little M.D.   Stopped at 08/19/19 0600    And   • polyethylene glycol/lytes (MIRALAX) PACKET 1 Packet  1 Packet Oral QDAY PRN Cornel Little M.D.        And   • magnesium hydroxide (MILK OF MAGNESIA) suspension 30 mL  30 mL Oral QDAY PRN Cornel Little M.D.        And   • bisacodyl (DULCOLAX) suppository 10 mg  10 mg Rectal QDAY PRCAMI Little M.D.       • enalaprilat (VASOTEC) injection 1.25 mg  1.25 mg Intravenous Q6HRS PRCAMI Little M.D.   1.25 mg at 08/14/19 0404   • hydrALAZINE (APRESOLINE) injection 20 mg  20 mg Intravenous Q6HRS PRCAMI Little M.D.   20 mg at 08/14/19 0115         Assessment/Plan  38 y.o. Female with ESRD on HD who presented 8/12/2019 with twitching, missed HD.      1. ESRD/HD -MWF -please see dialysis flow sheet for details    2. Uremic symptoms resolved with HD     3. HTN: elevated BP well controlled now    4. Anemia of CKD - Hb stable -at goal    5. Bacteremia with TV vegetation -scheduled for GAYATHRI tomorrow    6. Electrolytes : mold hyponatremia to monitor    Recs: HD MWF, renal diet, all meds to adjust to renal doses

## 2019-08-19 NOTE — PROGRESS NOTES
Hemodialysis done today, started @ 1003 and ended @ 1244 with net UF = 829ml. Blood returned 23 mins earlier due to sign of cartridge clotting, DR Shaver notified and patient agreed to stop HD for today. Order obtained from MD to stop HD. Report given to JOSETTE Montenegro. See flow sheet for details.

## 2019-08-19 NOTE — THERAPY
"Physical Therapy Evaluation completed.   Bed Mobility:  Supine to Sit: Modified Independent  Transfers: Sit to Stand: Supervised  Gait: Level Of Assist: Supervised with Front-Wheel Walker       Plan of Care: Patient with no further skilled PT needs in the acute care setting at this time  Discharge Recommendations: Equipment: No Equipment Needed. Post-acute therapy Currently anticipate no further skilled therapy needs once patient is discharged from the inpatient setting.    Pt is a 37 yo female with diagnosis of azotemia. Pt is able to ambulate safely with FWW and appears to be close to PLOF and should be able to live indpendently in 1st floor apt. No further acute PT needs and is currently not being actively followed for therapy services at this time. However, pt may be seen at the request of the care team for an additional visit to address any discharge or equipment needs within 30 days from initial evaluation.    See \"Rehab Therapy-Acute\" Patient Summary Report for complete documentation.     "

## 2019-08-19 NOTE — CARE PLAN
Problem: Communication  Goal: The ability to communicate needs accurately and effectively will improve  Outcome: PROGRESSING AS EXPECTED  POC updated with pt, questions answered, pt given time to voice concerns, no additional questions at this time.       Problem: Safety  Goal: Will remain free from injury  Description  SZ precautions implemented.    Outcome: PROGRESSING AS EXPECTED  Bed in low position, call light in reach, non slip socks on pt, proper signs on door.

## 2019-08-19 NOTE — PROGRESS NOTES
Report received from Pia FINCH. Plan of care discussed. Patient resting comfortably in bed. Safety precautions in place.

## 2019-08-19 NOTE — PROGRESS NOTES
Patient stated that she continues to have nerve pain and takes lyrica at home, patient asked if MD could add medication. Dr. Leigh informed and new orders placed.

## 2019-08-19 NOTE — PROGRESS NOTES
HOSPITAL MEDICINE PROGRESS NOTE    Date of service  8/18/2019    Chief Complaint  Seizure (not been taking her medication)      Hospital Course:    38 y.o. 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  Found to have severe azotemia with BUN > 100, k 5.8, CXR c/w pulm edema consistent with fluid overload state.  He had no obvious seizures. Ct heard negative.  She is in overt resp distress on 10 l oxygen/min saturating at 84%, so admitted to the ICU for BIPAP and emergent dialysis on 8/12/19.  Transferred out of ICU 08/15/19.      When I spoke to her on 8/15/19, she reported syncopal episode at home PTA.  She was apparently down on the floor of her home for unknown period of time.  A TTE on 8/15/19 suspicious for tricuspid valve vegetation.  A focus echo followed on 8/16/19 to look at the tricuspid valve the following day and confirmed TV vegetation.  Night of 8/16/19, blood culture drawn on 8/16/19 from her left chest port returned positive for gram positive cocci, prelim Staph, so vancomycin and Unasyn initiated after another set of blood cultures were ordered.       Interval History Updates:  Afebrile.  Bl cx positive for gram + cocci, updated today to CoNS, possible contaminant.  Patient request increase phosphate binder to home dose, so ordered.    Case discussed with Dr. Guerrero and Dr. Delong.      Consultants/Specialty  Nephrology  Critical Care   Cardiology    Review of Systems   Review of Systems   Constitutional: Negative for chills and fever.   Respiratory: Negative for shortness of breath.    Cardiovascular: Negative for chest pain, palpitations and leg swelling.   Gastrointestinal: Negative for abdominal pain,  constipation, diarrhea, nausea and vomiting.   Skin: Negative for rash.   Neurological: Negative for focal weakness.   Endo/Heme/Allergies: Negative.    All other systems reviewed and are negative.       Medications:  • sevelamer carbonate  3,200 mg TID WITH MEALS   • heparin pf  300-500 Units PRN   • hydrOXYzine HCl  10 mg TID PRN   • MD Alert...Vancomycin per Pharmacy   PHARMACY TO DOSE   • ampicillin-sulbactam (UNASYN) IV  1.5 g Q12HRS   • calcium carbonate  500 mg Q8HRS PRN   • heparin  5,000 Units Q12HRS   • traZODone  50 mg QHS PRN   • ondansetron  4 mg Q4HRS PRN   • oxyCODONE immediate release  20 mg 4X/DAY   • LORazepam  0.5 mg Q4HRS PRN   • amLODIPine  10 mg QDAY PRN   • ascorbic acid  500 mg DAILY   • cholecalciferol  400 Units DAILY   • docusate sodium  100 mg DAILY   • ferrous sulfate  325 mg DAILY   • lacosamide  100 mg BID   • lidocaine  1 Patch Q24HR   • omeprazole  20 mg DAILY   • NS  250 mL DIALYSIS PRN   • albumin human 25%  12.5 g DIALYSIS PRN   • lidocaine  1 mL PRN   • epoetin rk  2,000 Units MO, WE + FR    And   • epoetin rk  2,000 Units MO, WE + FR   • senna-docusate  2 Tab BID    And   • polyethylene glycol/lytes  1 Packet QDAY PRN    And   • magnesium hydroxide  30 mL QDAY PRN    And   • bisacodyl  10 mg QDAY PRN   • enalaprilat  1.25 mg Q6HRS PRN   • hydrALAZINE  20 mg Q6HRS PRN       Physical Exam   Vitals:    08/18/19 0417 08/18/19 0708 08/18/19 1213 08/18/19 1552   BP: 106/60 (!) 94/51 127/66 141/76   Pulse: 79 81 69 82   Resp: 18 18 17 17   Temp: 36.6 °C (97.8 °F) 36.6 °C (97.8 °F) 36.6 °C (97.8 °F) 36.7 °C (98 °F)   TempSrc: Oral Oral Oral Oral   SpO2: 93% 93% 90% 93%   Weight: 81.6 kg (179 lb 14.3 oz)      Height:           Physical Exam   Constitutional: She is oriented to person, place, and time. No distress.   HENT:   Head: Normocephalic and atraumatic.   Eyes: Pupils are equal, round, and reactive to light. Conjunctivae are normal. No scleral icterus.   Neck: Normal range of  motion. Neck supple.   Cardiovascular: Normal rate, regular rhythm and intact distal pulses. Exam reveals no gallop and no friction rub.   No murmur heard.  L anterior chest port, no erythema.   Pulmonary/Chest: Effort normal. No respiratory distress. She has no wheezes. She has no rales. She exhibits no tenderness.   Abdominal: Soft. Bowel sounds are normal. She exhibits no distension and no mass. There is no tenderness. There is no rebound and no guarding.   Musculoskeletal: Normal range of motion. She exhibits no edema or tenderness.   RUE fistula   Neurological: She is alert and oriented to person, place, and time.   Skin: Skin is warm and dry. She is not diaphoretic.   Small cyst in the left mandible.  No purulent discharge were able to be exuded on exam.   Psychiatric: Mood and affect normal.   Nursing note and vitals reviewed.       Laboratory   Recent Labs     08/16/19  0258 08/17/19  0130 08/18/19  0555   WBC 8.9 6.8 5.8   RBC 3.84* 3.35* 3.33*   HEMOGLOBIN 11.7* 10.3* 10.2*   HEMATOCRIT 36.3* 32.1* 31.6*       Recent Labs     08/16/19  0258 08/17/19  0130 08/18/19  0555   SODIUM 134* 132* 134*   POTASSIUM 4.7 4.0 4.4   CHLORIDE 89* 91* 95*   CO2 23 26 23   GLUCOSE 100* 96 110*   BUN 87* 41* 63*   CREATININE 13.45* 8.33* 11.30*         Blood Culture   Date Value Ref Range Status   12/04/2018 No growth after 5 days of incubation.  Final      Bl cx 8/15 peripheral NGTD  Bl cx 8/15 line positive for CoNS. Source: left chest port.    Bl cx 8/16 peripheral NGTD  Bl cx 8/16 line x2 NGTD       Radiology  EC-ECHOCARDIOGRAM LTD W/O CONT 8/16/19  CONCLUSIONS  Prior echocardiogram 8/15/2019.Technically difficult study - adequate   information is obtained.   Limited.   Echogenic mobile mass noted on the septal tricuspid valve leaflet   measuring 1.0 cm in width and 0.8 cm in length.    TTE 8/15/19:  CONCLUSIONS  Normal left ventricular systolic function. Left ventricular ejection   fraction is visually estimated to be  55%.   Indeterminate diastolic function.  Normal inferior vena cava size without inspiratory collapse.  Estimated right ventricular systolic pressure is 26 mmHg.  In the apical 4 chamber view, there is a linear somewhat mobile   echodensity adherent to the tricuspid valve which could represent a   catheter vs. artifact. A valvular vegetation is possible as well.  The   valve is not well visualized in other views. May consider limited echo   to re-evaluate tricuspid valve further. If this finding is present on   other views, the patient may need a transesophageal echocardiogram.   Dr. Leigh was personally updated about the above findings at the time   of the read.        Results for orders placed or performed during the hospital encounter of 07/19/17   ECHOCARDIOGRAM COMP W/O CONT   Result Value Ref Range    Eject.Frac. MOD BP 58.38     Eject.Frac. MOD 4C 67.14     Eject.Frac. MOD 2C 67.81     Left Ventrical Ejection Fraction 60         Assessment and Plan    Principal Problem:    Acute bacteria tricuspid valve endocarditis: New problem.  Bl cx 8/16/19 final CoNS 1/2 bottles from her port.  Serial bl cx NGTD.  CT chest NONCONT negative for septic emboli.  Both echos confirm TV vegetation, so given the final bl cx result, this could represent mass, thrombus, and others and not vegetation.  Cont vancomycin + Unasyn (D#3) for now until GAYATHRI planed for tomorrow after I discussed the case with Dr. Guerrero to request GAYATHRI.  NPO @ [facility].    Syncope POA Yes:  Patient reports passing out (was on the floor for unknown amount of time, woke up called 911) prior to presentation.  MRI brain negative for acute pathology.  Most likely due to severe fluid over load from inadequate HD.      Acute respiratory failure with hypoxia (HCC) POA: Yes.  Cont to wean oxygen.  HD to remove fluid per Nephrology.    Possible infected left chest port:  If GAYATHRI negative and contaminated bl cx, then will not have to be removed.  It was placed 4 years ago by  Dr. Ardon, who is now retired.      Hypotension:  SBP now 90's.  Not symptomatic.  Watch volume status.  Echo LV 55%.  Monitor.      Left mandible mass: I had attempted to I&D the lesion 8/15, but no purulent material was able to be exude.  Suspect cystic in nature.  No pain or tenderness.  No surrounding erythema.  We will continue to monitor.    Hyperphosphatemia:  Down trending on sevelamer which I changed to 3200 mg PO TID with meals.     Azotemia POA: yes and ESRD (end stage renal disease) on dialysis (HCC) POA: Yes.  On going HD per Nephrology.      Avascular necrosis of bones of both hips (HCC) POA: Yes.  Pain control/support.  Outpt f/u with Ortho for surgery.    Seizures (HCC) POA: Unknown.  None recently.  Cont Vimpat.    Hypertension POA: Yes.  BP controlled with HD    Anemia of CKD:  Cont iron supplement.  Epogen.      Insomnia (new):  Cont trazodone PRN at night    DVT prophylaxis: SC heparin.    CODE STATUS:  Full Code    Disposition: TBD. PT OT eval ordered.      Case was discussed with Charge Nurse, Medical SW, , and Pharmacist on IDTround in addition to individual(s) mentioned above.    I have performed a physical exam and reviewed and updated ROS as of today, 8/18/2019.  In review of yesterday's note dated, 8/17/2019, there are no changes except as documented above.      Martina Leigh M.D.

## 2019-08-19 NOTE — THERAPY
"Occupational Therapy Evaluation completed.   Functional Status:  37 yo female admitted with SOB and twitching- had missed dialysis.  Pt reports that she had a fall at home, and has been laying low since the fall and didn't hear her grandmother knocking on the door when it was time for dialysis.  Pt reports she is fairly indep at home normally and does simple light ADL's- she has assist with house keeping, and states she can get help for grocery shopping etc as needed.  Grandmother drives her to dialysis 3 times a week.  Currently pt completed dialysis earlier today, and is currently sitting upright in bed.  She appears alert and appropriate with conversation although soft spoken.  She was able to get OOB Darrel, walk supervised with FWW in room and in mcdonald.  She reports she's been going to BR indep with IV pole, changing clothing with setup, washing up at sink etc without nursing assist.  Pt reports \"I was in bad shape when I came in and I asked for therapy.  Now I am doing better.\"  Pt appears to be close to baseline, and has AE at home to help with mobility and ADL's.  No further apparent OT needs in this setting at this time.    Plan of Care: Patient with no further skilled OT needs in the acute care setting at this time  Discharge Recommendations:  Equipment: No Equipment Needed. Post-acute therapy Discharge to home with outpatient or home health for additional skilled therapy services VS no further skilled therapy needs once patient is discharged from the inpatient setting.    See \"Rehab Therapy-Acute\" Patient Summary Report for complete documentation.    "

## 2019-08-19 NOTE — PROGRESS NOTES
Telemetry Shift Summary    Rhythm SR  HR Range 72-77  Ectopy (r)PAC, (r)PVC  Measurements .16/.08/.34          Normal Values  Rhythm SR  HR Range    Measurements 0.12-0.20 / 0.06-0.10  / 0.30-0.52

## 2019-08-19 NOTE — PROGRESS NOTES
Dialysis at bedside. Patient informed that GAYATHRI is scheduled for tomorrow at 0715. MD aware, diet order entered.

## 2019-08-19 NOTE — CONSULTS
Primary team has requested a GAYATHRI to assess a tricuspid valve vegetation.  Patient has one positive blood cultures from 8/16/2019. She has a PMH of ESRD on HD and avascular necrosis of the hip with chronic pain med needs.  Her echo showed a mobile mass on the tricuspid valve.

## 2019-08-20 ENCOUNTER — APPOINTMENT (OUTPATIENT)
Dept: CARDIOLOGY | Facility: MEDICAL CENTER | Age: 39
DRG: 189 | End: 2019-08-20
Attending: INTERNAL MEDICINE
Payer: MEDICARE

## 2019-08-20 ENCOUNTER — APPOINTMENT (OUTPATIENT)
Dept: PHYSICAL MEDICINE AND REHAB | Facility: MEDICAL CENTER | Age: 39
End: 2019-08-20
Payer: MEDICARE

## 2019-08-20 ENCOUNTER — ANESTHESIA EVENT (OUTPATIENT)
Dept: SURGERY | Facility: MEDICAL CENTER | Age: 39
DRG: 189 | End: 2019-08-20
Payer: MEDICARE

## 2019-08-20 ENCOUNTER — ANESTHESIA (OUTPATIENT)
Dept: SURGERY | Facility: MEDICAL CENTER | Age: 39
DRG: 189 | End: 2019-08-20
Payer: MEDICARE

## 2019-08-20 PROBLEM — I10 HYPERTENSION: Status: RESOLVED | Noted: 2017-02-06 | Resolved: 2019-08-20

## 2019-08-20 LAB
ANION GAP SERPL CALC-SCNC: 14 MMOL/L (ref 0–11.9)
B-HCG FREE SERPL-ACNC: <5 MIU/ML
BACTERIA BLD CULT: NORMAL
BASOPHILS # BLD AUTO: 1.2 % (ref 0–1.8)
BASOPHILS # BLD: 0.06 K/UL (ref 0–0.12)
BUN SERPL-MCNC: 38 MG/DL (ref 8–22)
CALCIUM SERPL-MCNC: 9.3 MG/DL (ref 8.4–10.2)
CHLORIDE SERPL-SCNC: 96 MMOL/L (ref 96–112)
CO2 SERPL-SCNC: 27 MMOL/L (ref 20–33)
CREAT SERPL-MCNC: 9.25 MG/DL (ref 0.5–1.4)
EOSINOPHIL # BLD AUTO: 0.13 K/UL (ref 0–0.51)
EOSINOPHIL NFR BLD: 2.7 % (ref 0–6.9)
ERYTHROCYTE [DISTWIDTH] IN BLOOD BY AUTOMATED COUNT: 41.1 FL (ref 35.9–50)
GLUCOSE SERPL-MCNC: 86 MG/DL (ref 65–99)
HCT VFR BLD AUTO: 30.4 % (ref 37–47)
HGB BLD-MCNC: 9.7 G/DL (ref 12–16)
IHCGL IHCGL: NEGATIVE MIU/ML
IMM GRANULOCYTES # BLD AUTO: 0.17 K/UL (ref 0–0.11)
IMM GRANULOCYTES NFR BLD AUTO: 3.5 % (ref 0–0.9)
LV EJECT FRACT  99904: 65
LYMPHOCYTES # BLD AUTO: 1.08 K/UL (ref 1–4.8)
LYMPHOCYTES NFR BLD: 22.3 % (ref 22–41)
MCH RBC QN AUTO: 30.6 PG (ref 27–33)
MCHC RBC AUTO-ENTMCNC: 31.9 G/DL (ref 33.6–35)
MCV RBC AUTO: 95.9 FL (ref 81.4–97.8)
MONOCYTES # BLD AUTO: 0.69 K/UL (ref 0–0.85)
MONOCYTES NFR BLD AUTO: 14.3 % (ref 0–13.4)
NEUTROPHILS # BLD AUTO: 2.71 K/UL (ref 2–7.15)
NEUTROPHILS NFR BLD: 56 % (ref 44–72)
NRBC # BLD AUTO: 0 K/UL
NRBC BLD-RTO: 0 /100 WBC
PHOSPHATE SERPL-MCNC: 5.5 MG/DL (ref 2.5–4.5)
PLATELET # BLD AUTO: 138 K/UL (ref 164–446)
PMV BLD AUTO: 10.9 FL (ref 9–12.9)
POTASSIUM SERPL-SCNC: 4.6 MMOL/L (ref 3.6–5.5)
RBC # BLD AUTO: 3.17 M/UL (ref 4.2–5.4)
SIGNIFICANT IND 70042: NORMAL
SITE SITE: NORMAL
SODIUM SERPL-SCNC: 137 MMOL/L (ref 135–145)
SOURCE SOURCE: NORMAL
WBC # BLD AUTO: 4.8 K/UL (ref 4.8–10.8)

## 2019-08-20 PROCEDURE — 110305 HCHG STAT TEE: Performed by: INTERNAL MEDICINE

## 2019-08-20 PROCEDURE — 700102 HCHG RX REV CODE 250 W/ 637 OVERRIDE(OP): Performed by: INTERNAL MEDICINE

## 2019-08-20 PROCEDURE — A9270 NON-COVERED ITEM OR SERVICE: HCPCS | Performed by: INTERNAL MEDICINE

## 2019-08-20 PROCEDURE — 770020 HCHG ROOM/CARE - TELE (206)

## 2019-08-20 PROCEDURE — B24BZZ4 ULTRASONOGRAPHY OF HEART WITH AORTA, TRANSESOPHAGEAL: ICD-10-PCS | Performed by: INTERNAL MEDICINE

## 2019-08-20 PROCEDURE — 80048 BASIC METABOLIC PNL TOTAL CA: CPT

## 2019-08-20 PROCEDURE — 160048 HCHG OR STATISTICAL LEVEL 1-5: Performed by: INTERNAL MEDICINE

## 2019-08-20 PROCEDURE — 700105 HCHG RX REV CODE 258: Performed by: INTERNAL MEDICINE

## 2019-08-20 PROCEDURE — 700111 HCHG RX REV CODE 636 W/ 250 OVERRIDE (IP): Performed by: ANESTHESIOLOGY

## 2019-08-20 PROCEDURE — 160002 HCHG RECOVERY MINUTES (STAT): Performed by: INTERNAL MEDICINE

## 2019-08-20 PROCEDURE — 700111 HCHG RX REV CODE 636 W/ 250 OVERRIDE (IP): Performed by: INTERNAL MEDICINE

## 2019-08-20 PROCEDURE — 84702 CHORIONIC GONADOTROPIN TEST: CPT

## 2019-08-20 PROCEDURE — 99232 SBSQ HOSP IP/OBS MODERATE 35: CPT | Performed by: INTERNAL MEDICINE

## 2019-08-20 PROCEDURE — 93312 ECHO TRANSESOPHAGEAL: CPT | Mod: 26 | Performed by: INTERNAL MEDICINE

## 2019-08-20 PROCEDURE — 99232 SBSQ HOSP IP/OBS MODERATE 35: CPT | Performed by: HOSPITALIST

## 2019-08-20 PROCEDURE — 84100 ASSAY OF PHOSPHORUS: CPT

## 2019-08-20 PROCEDURE — 160009 HCHG ANES TIME/MIN: Performed by: INTERNAL MEDICINE

## 2019-08-20 PROCEDURE — 160035 HCHG PACU - 1ST 60 MINS PHASE I: Performed by: INTERNAL MEDICINE

## 2019-08-20 PROCEDURE — 93325 DOPPLER ECHO COLOR FLOW MAPG: CPT

## 2019-08-20 PROCEDURE — 85025 COMPLETE CBC W/AUTO DIFF WBC: CPT

## 2019-08-20 PROCEDURE — 700101 HCHG RX REV CODE 250: Performed by: INTERNAL MEDICINE

## 2019-08-20 RX ORDER — ONDANSETRON 2 MG/ML
4 INJECTION INTRAMUSCULAR; INTRAVENOUS
Status: DISCONTINUED | OUTPATIENT
Start: 2019-08-20 | End: 2019-08-21 | Stop reason: HOSPADM

## 2019-08-20 RX ORDER — SODIUM CHLORIDE 9 MG/ML
INJECTION, SOLUTION INTRAVENOUS ONCE
Status: COMPLETED | OUTPATIENT
Start: 2019-08-20 | End: 2019-08-20

## 2019-08-20 RX ORDER — SODIUM CHLORIDE 9 MG/ML
INJECTION, SOLUTION INTRAVENOUS CONTINUOUS
Status: DISCONTINUED | OUTPATIENT
Start: 2019-08-20 | End: 2019-08-21 | Stop reason: HOSPADM

## 2019-08-20 RX ORDER — SODIUM CHLORIDE, SODIUM LACTATE, POTASSIUM CHLORIDE, CALCIUM CHLORIDE 600; 310; 30; 20 MG/100ML; MG/100ML; MG/100ML; MG/100ML
INJECTION, SOLUTION INTRAVENOUS CONTINUOUS
Status: DISCONTINUED | OUTPATIENT
Start: 2019-08-20 | End: 2019-08-21 | Stop reason: HOSPADM

## 2019-08-20 RX ADMIN — PREGABALIN 75 MG: 75 CAPSULE ORAL at 17:15

## 2019-08-20 RX ADMIN — SEVELAMER CARBONATE 3200 MG: 800 TABLET, FILM COATED ORAL at 17:09

## 2019-08-20 RX ADMIN — LACOSAMIDE 100 MG: 50 TABLET, FILM COATED ORAL at 17:09

## 2019-08-20 RX ADMIN — HYDROXYZINE HYDROCHLORIDE 10 MG: 10 TABLET, FILM COATED ORAL at 22:31

## 2019-08-20 RX ADMIN — TRAZODONE HYDROCHLORIDE 50 MG: 50 TABLET ORAL at 22:31

## 2019-08-20 RX ADMIN — PREGABALIN 75 MG: 75 CAPSULE ORAL at 11:15

## 2019-08-20 RX ADMIN — OXYCODONE HYDROCHLORIDE 20 MG: 10 TABLET ORAL at 12:33

## 2019-08-20 RX ADMIN — PROPOFOL 100 MCG/KG/MIN: 10 INJECTION, EMULSION INTRAVENOUS at 07:48

## 2019-08-20 RX ADMIN — SODIUM CHLORIDE, PRESERVATIVE FREE 500 UNITS: 5 INJECTION INTRAVENOUS at 22:21

## 2019-08-20 RX ADMIN — SODIUM CHLORIDE: 9 INJECTION, SOLUTION INTRAVENOUS at 07:28

## 2019-08-20 RX ADMIN — SEVELAMER CARBONATE 3200 MG: 800 TABLET, FILM COATED ORAL at 10:26

## 2019-08-20 RX ADMIN — FENTANYL CITRATE 100 MCG: 50 INJECTION, SOLUTION INTRAMUSCULAR; INTRAVENOUS at 07:48

## 2019-08-20 RX ADMIN — OXYCODONE HYDROCHLORIDE 20 MG: 10 TABLET ORAL at 17:10

## 2019-08-20 RX ADMIN — LIDOCAINE 1 PATCH: 50 PATCH TOPICAL at 10:24

## 2019-08-20 RX ADMIN — OXYCODONE HYDROCHLORIDE 20 MG: 10 TABLET ORAL at 21:19

## 2019-08-20 ASSESSMENT — ENCOUNTER SYMPTOMS
SINUS PAIN: 0
DIZZINESS: 0
WEIGHT LOSS: 0
WEAKNESS: 1
SHORTNESS OF BREATH: 0
DOUBLE VISION: 0
BLOOD IN STOOL: 0
DIAPHORESIS: 0
CARDIOVASCULAR NEGATIVE: 1
HEADACHES: 0
CONSTIPATION: 0
CONSTITUTIONAL NEGATIVE: 1
VOMITING: 0
PALPITATIONS: 0
LOSS OF CONSCIOUSNESS: 0
ORTHOPNEA: 0
BACK PAIN: 1
RESPIRATORY NEGATIVE: 1
HEMOPTYSIS: 0
PSYCHIATRIC NEGATIVE: 1
CHILLS: 0
GASTROINTESTINAL NEGATIVE: 1
MUSCULOSKELETAL NEGATIVE: 1
DIARRHEA: 0
MYALGIAS: 0
FEVER: 0
WHEEZING: 0
FOCAL WEAKNESS: 0
COUGH: 0
ABDOMINAL PAIN: 0
NERVOUS/ANXIOUS: 0
HEARTBURN: 0
NAUSEA: 0
EYES NEGATIVE: 1
BRUISES/BLEEDS EASILY: 0
SEIZURES: 0

## 2019-08-20 ASSESSMENT — COGNITIVE AND FUNCTIONAL STATUS - GENERAL
MOBILITY SCORE: 24
DAILY ACTIVITIY SCORE: 24
SUGGESTED CMS G CODE MODIFIER DAILY ACTIVITY: CH
SUGGESTED CMS G CODE MODIFIER MOBILITY: CH

## 2019-08-20 ASSESSMENT — PAIN SCALES - GENERAL: PAIN_LEVEL: 0

## 2019-08-20 NOTE — PROGRESS NOTES
Ambulated patient down the hallway, pt tolerated well, no complains of dizziness. Assisted patient back to bed, pt performed oral care.

## 2019-08-20 NOTE — PROGRESS NOTES
Report given to Nelia FINCH. Plan of care discussed. Patient resting comfortably in bed. Safety precautions in place.

## 2019-08-20 NOTE — PROGRESS NOTES
Telemetry Shift Summary    Rhythm SR  HR Range 74-99  Ectopy R PVCs  Measurements .14/.08/.38        Normal Values  Rhythm SR  HR Range    Measurements 0.12-0.20 / 0.06-0.10  / 0.30-0.52

## 2019-08-20 NOTE — ANESTHESIA POSTPROCEDURE EVALUATION
Patient: Debby Carrizales    Procedure Summary     Date:  08/20/19 Room / Location:   OR  / SURGERY [facility]    Anesthesia Start:  0745 Anesthesia Stop:  0816    Procedure:  ECHOCARDIOGRAM, TRANSESOPHAGEAL (N/A Mouth) Diagnosis:  (ESRD)    Surgeon:  Marta Elaine M.D. Responsible Provider:  Kaleb Kimbrough D.O.    Anesthesia Type:  MAC ASA Status:  4          Final Anesthesia Type: MAC  Last vitals  BP   Blood Pressure: 108/68    Temp   36.7 °C (98.1 °F)    Pulse   Pulse: 77   Resp   14    SpO2   95 %      Anesthesia Post Evaluation    Patient location during evaluation: PACU  Patient participation: complete - patient participated  Level of consciousness: awake and alert  Pain score: 0    Airway patency: patent  Anesthetic complications: no  Cardiovascular status: hemodynamically stable  Respiratory status: acceptable  Hydration status: euvolemic    PONV: none           Nurse Pain Score: 4 (NPRS)

## 2019-08-20 NOTE — ANESTHESIA PREPROCEDURE EVALUATION
Relevant Problems   ANESTHESIA   (+) DAVI (obstructive sleep apnea)      NEURO   (+) Seizures (HCC)      CARDIAC   (+) A-V fistula ([facility])   (+) Hypertension         (+) Chronic lupus nephritis ([facility])   (+) ESRD (end stage renal disease) on dialysis ([facility])       Physical Exam    Airway   Mallampati: II  TM distance: >3 FB  Neck ROM: full       Cardiovascular - normal exam  Rhythm: regular  Rate: normal  (-) murmur     Dental - normal exam         Pulmonary - normal exam  Breath sounds clear to auscultation     Abdominal    Neurological - normal exam                 Anesthesia Plan    ASA 4   ASA physical status 4 criteria: severe valve dysfunction    Plan - MAC                       Informed Consent:

## 2019-08-20 NOTE — PROGRESS NOTES
HOSPITAL MEDICINE PROGRESS NOTE    Date of service  8/19/2019    Chief Complaint  Seizure (not been taking her medication)      Hospital Course:    38 y.o. 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  Found to have severe azotemia with BUN > 100, k 5.8, CXR c/w pulm edema consistent with fluid overload state.  He had no obvious seizures. Ct heard negative.  She is in overt resp distress on 10 l oxygen/min saturating at 84%, so admitted to the ICU for BIPAP and emergent dialysis on 8/12/19.  Transferred out of ICU 08/15/19.      When I spoke to her on 8/15/19, she reported syncopal episode at home PTA.  She was apparently down on the floor of her home for unknown period of time.  A TTE on 8/15/19 suspicious for tricuspid valve vegetation.  A focus echo followed on 8/16/19 to look at the tricuspid valve the following day and confirmed TV vegetation.  Night of 8/16/19, blood culture drawn on 8/16/19 from her left chest port returned positive for gram positive cocci, prelim Staph, so vancomycin and Unasyn initiated after another set of blood cultures were ordered.  Finalized blood culture shows CoNS in one bottle while serial blood cultures remain NGTD.  A GAYATHRI is scheduled for 8/19, but rescheduled to 8/20 due to staffing/Anesthesia availability.      She was weaned off of O2 on 8/18/19.  Now, on room air.         Interval History Updates:  Afebrile.  No event.  NPO for GAYATHRI today.  Case discussed with Dr. Elaine for GAYATHRI to characterize the TV lesion.    Consultants/Specialty  Nephrology  Critical Care   Cardiology    Review of Systems   Review of Systems   Constitutional: Negative for chills and fever.   Respiratory:  Negative for shortness of breath.    Cardiovascular: Negative for chest pain, palpitations and leg swelling.   Gastrointestinal: Negative for abdominal pain, constipation, diarrhea, nausea and vomiting.   Skin: Negative for rash.   Neurological: Negative for focal weakness.   Endo/Heme/Allergies: Negative.    All other systems reviewed and are negative.       Medications:  • [START ON 8/21/2019] epoetin rk  2,000 Units MO, WE + FR    And   • [START ON 8/21/2019] epoetin rk  2,000 Units MO, WE + FR   • pregabalin  75 mg TID   • sevelamer carbonate  3,200 mg TID WITH MEALS   • heparin pf  300-500 Units PRN   • hydrOXYzine HCl  10 mg TID PRN   • ampicillin-sulbactam (UNASYN) IV  1.5 g Q12HRS   • calcium carbonate  500 mg Q8HRS PRN   • heparin  5,000 Units Q12HRS   • traZODone  50 mg QHS PRN   • ondansetron  4 mg Q4HRS PRN   • oxyCODONE immediate release  20 mg 4X/DAY   • LORazepam  0.5 mg Q4HRS PRN   • amLODIPine  10 mg QDAY PRN   • ascorbic acid  500 mg DAILY   • cholecalciferol  400 Units DAILY   • docusate sodium  100 mg DAILY   • ferrous sulfate  325 mg DAILY   • lacosamide  100 mg BID   • lidocaine  1 Patch Q24HR   • omeprazole  20 mg DAILY   • NS  250 mL DIALYSIS PRN   • albumin human 25%  12.5 g DIALYSIS PRN   • lidocaine  1 mL PRN   • senna-docusate  2 Tab BID    And   • polyethylene glycol/lytes  1 Packet QDAY PRN    And   • magnesium hydroxide  30 mL QDAY PRN    And   • bisacodyl  10 mg QDAY PRN   • enalaprilat  1.25 mg Q6HRS PRN   • hydrALAZINE  20 mg Q6HRS PRN       Physical Exam   Vitals:    08/19/19 0610 08/19/19 0703 08/19/19 1205 08/19/19 1549   BP:  146/81 126/79 114/61   Pulse:  82 78 82   Resp:  16 16 16   Temp:  36.7 °C (98.1 °F) 36.5 °C (97.7 °F) 36.6 °C (97.8 °F)   TempSrc:  Oral Oral Oral   SpO2:  96%  91%   Weight: 84.5 kg (186 lb 4.6 oz)      Height:           Physical Exam   Constitutional: She is oriented to person, place, and time. No distress.   HENT:   Head: Normocephalic and  atraumatic.   Eyes: Pupils are equal, round, and reactive to light. Conjunctivae are normal. No scleral icterus.   Neck: Normal range of motion. Neck supple.   Cardiovascular: Normal rate, regular rhythm and intact distal pulses. Exam reveals no gallop and no friction rub.   No murmur heard.  L anterior chest port, no erythema.   Pulmonary/Chest: Effort normal. No respiratory distress. She has no wheezes. She has no rales. She exhibits no tenderness.   Abdominal: Soft. Bowel sounds are normal. She exhibits no distension and no mass. There is no tenderness. There is no rebound and no guarding.   Musculoskeletal: Normal range of motion. She exhibits no edema or tenderness.   RUE fistula   Neurological: She is alert and oriented to person, place, and time.   Skin: Skin is warm and dry. She is not diaphoretic.   Small cyst in the left mandible.  No purulent discharge were able to be exuded on exam.   Psychiatric: Mood and affect normal.   Nursing note and vitals reviewed.       Laboratory   Recent Labs     08/17/19  0130 08/18/19  0555 08/19/19  0310   WBC 6.8 5.8 7.1   RBC 3.35* 3.33* 3.23*   HEMOGLOBIN 10.3* 10.2* 10.0*   HEMATOCRIT 32.1* 31.6* 30.3*       Recent Labs     08/17/19  0130 08/18/19  0555 08/19/19  0310   SODIUM 132* 134* 134*   POTASSIUM 4.0 4.4 4.8   CHLORIDE 91* 95* 94*   CO2 26 23 23   GLUCOSE 96 110* 98   BUN 41* 63* 72*   CREATININE 8.33* 11.30* 13.31*         Blood Culture   Date Value Ref Range Status   12/04/2018 No growth after 5 days of incubation.  Final      Bl cx 8/15 peripheral NGTD  Bl cx 8/15 line positive for CoNS. Source: left chest port.    Bl cx 8/16 peripheral NGTD  Bl cx 8/16 line x2 NGTD       Radiology  EC-ECHOCARDIOGRAM LTD W/O CONT 8/16/19  CONCLUSIONS  Prior echocardiogram 8/15/2019.Technically difficult study - adequate   information is obtained.   Limited.   Echogenic mobile mass noted on the septal tricuspid valve leaflet   measuring 1.0 cm in width and 0.8 cm in  length.    TTE 8/15/19:  CONCLUSIONS  Normal left ventricular systolic function. Left ventricular ejection   fraction is visually estimated to be 55%.   Indeterminate diastolic function.  Normal inferior vena cava size without inspiratory collapse.  Estimated right ventricular systolic pressure is 26 mmHg.  In the apical 4 chamber view, there is a linear somewhat mobile   echodensity adherent to the tricuspid valve which could represent a   catheter vs. artifact. A valvular vegetation is possible as well.  The   valve is not well visualized in other views. May consider limited echo   to re-evaluate tricuspid valve further. If this finding is present on   other views, the patient may need a transesophageal echocardiogram.   Dr. Leigh was personally updated about the above findings at the time   of the read.        Results for orders placed or performed during the hospital encounter of 07/19/17   ECHOCARDIOGRAM COMP W/O CONT   Result Value Ref Range    Eject.Frac. MOD BP 58.38     Eject.Frac. MOD 4C 67.14     Eject.Frac. MOD 2C 67.81     Left Ventrical Ejection Fraction 60         Assessment and Plan    Principal Problem:    ?Acute bacteria tricuspid valve endocarditis: Bl cx 8/16/19 final CoNS 1/2 bottles from her port.  Serial bl cx NGTD.  CT chest NONCONT negative for septic emboli.  Both echos confirm TV vegetation, but given the final bl cx result, this could represent mass, thrombus, and others and not vegetation.  Cont Unasyn (D#4) for now until GAYATHRI rescheduled for tomorrow.  NPO @ [place].  Discontinue vancomycin today as suspicion for infection now lower.  If GAYATHRI negative endocarditis, then dc Unasyn.      Syncope POA Yes:  Patient reports passing out (was on the floor for unknown amount of time, woke up called 911) prior to presentation.  MRI brain negative for acute pathology.  Most likely due to severe fluid over load from inadequate HD.      Acute respiratory failure with hypoxia (HCC) POA: Yes.  Now off of O2  with scheduled HD.    Possible infected left chest port:  If GAYATHRI negative and contaminated bl cx, then will not have to be removed.  It was placed 4 years ago by Dr. Ardon, who is now retired.  This represents high risk for local and systemic infection, bacteremia, and sepsis.  Advised to minimize access if at all possible given her underlying comorbidities and scar tissues of left UE.    Hypotension:  Resolved.  Echo LV 55%.  Monitor.      Left mandible mass: I had attempted to I&D the lesion 8/15, but no purulent material was able to be exude.  Suspect cystic in nature.  No pain or tenderness.  No surrounding erythema.  Spontaneously drained 8/18/19.    Hyperphosphatemia:  Down trending on sevelamer 3200 mg PO TID with meals.     Azotemia POA: yes and ESRD (end stage renal disease) on dialysis (HCC) POA: Yes.  On going HD per Nephrology.      Avascular necrosis of bones of both hips (HCC) POA: Yes.  Pain control/support.  Outpt f/u with Ortho for surgery.    Seizures (HCC) POA: Unknown.  None recently.  Cont Vimpat.    Hypertension POA: Yes.  BP controlled with HD    Anemia of CKD:  Cont iron supplement.  Epogen.      Insomnia:  Cont trazodone PRN at night    H/o SLE:  Restart plaquenil 200mg PO daily      DVT prophylaxis: SC heparin.    CODE STATUS:  Full Code    Disposition: Seen by PT 8/19 and recs home when medically stable.        Case was discussed with Charge Nurse, Medical SW, , and Pharmacist on IDTround in addition to individual(s) mentioned above.    I have performed a physical exam and reviewed and updated ROS as of today, 8/19/2019.  In review of yesterday's note dated, 8/18/2019, there are no changes except as documented above.      Martina Leigh M.D.

## 2019-08-20 NOTE — PROGRESS NOTES
Spoke to Malika Yeh RN to update MD during rounding regarding OR orders for IV fluids, Dr. Leiva to discontinue orders.

## 2019-08-20 NOTE — ASSESSMENT & PLAN NOTE
Endocarditis of the valves at this point has been ruled out.  Stop at this point IV antibiotics  The GAYATHRI at this point shows that the patient's venous port extends through the valve and is causing possibly the patient's chest pain, dizziness.

## 2019-08-20 NOTE — PROGRESS NOTES
Telemetry Shift Summary    Rhythm SR  HR Range 63-79  Ectopy none  Measurements .14/.08/.40        Normal Values  Rhythm SR  HR Range    Measurements 0.12-0.20 / 0.06-0.10  / 0.30-0.52

## 2019-08-20 NOTE — ASSESSMENT & PLAN NOTE
Continue with chronic seizure management.  Patient is on Vimpat and has not had any seizures while here.

## 2019-08-20 NOTE — PROGRESS NOTES
Report given to Pia FINCH. Plan of care discussed. Patient resting comfortably in bed. Safety precautions in place.

## 2019-08-20 NOTE — PROGRESS NOTES
Patient is back from PACU, patient is alert and oriented x 4, patient stated she has has port for 4 years and was placed by Dr. Ardon, Dr. Leiva updated. Safety precautions in place.

## 2019-08-20 NOTE — PROGRESS NOTES
Telemetry Shift Summary    Rhythm SR/ST  HR Range   Ectopy none  Measurements .14/.08/.38          Normal Values  Rhythm SR  HR Range    Measurements 0.12-0.20 / 0.06-0.10  / 0.30-0.52

## 2019-08-20 NOTE — OR NURSING
0814: To PACU from OR via gurney, very drowsy, denies pain/nausea/dyspnea, respirations spontaneous and non-labored. HOB 45 degrees  0820: O2 d/dang by pt so commenced trial of RA  0825: More awake, continues to deny discomfort, verbalizes readiness for transfer to room.  0840: No change.Meets criteria to transfer to room

## 2019-08-20 NOTE — ANESTHESIA TIME REPORT
Anesthesia Start and Stop Event Times     Date Time Event    8/20/2019 0723 Ready for Procedure     0745 Anesthesia Start     0816 Anesthesia Stop        Responsible Staff  08/20/19    Name Role Begin End    Kaleb Kimbrough D.O. Anesth 0745 0816        Preop Diagnosis (Free Text):  Pre-op Diagnosis     ESRD        Preop Diagnosis (Codes):    Post op Diagnosis  Endocarditis, suspected      Premium Reason  Non-Premium    Comments:

## 2019-08-20 NOTE — ASSESSMENT & PLAN NOTE
Continue with blood pressure management optimization to keep systolic blood pressure less than 140 diastolic under 90.  Continue with Norvasc 10 mg daily, continue with hydralazine as needed

## 2019-08-20 NOTE — PROGRESS NOTES
[facility] Medicine Daily Progress Note    Date of Service  8/20/2019    Chief Complaint  38 y.o. female admitted 8/12/2019 with shortness of breath    Hospital Course    Patient is a chronic end-stage renal dialysis patient due to lupus nephritis.  The patient apparently has missed some of her dialysis and thus came in that the patient had to be urgently dialyzed.  Afterwards patient was having some chest discomfort and was thought that the patient needed a evaluation thus an echocardiogram was ordered which showed possible endocarditis.  Patient was begun on antibiotics.  Today the patient underwent a GAYATHRI evaluation which does not show endocarditis but does show the venous catheter going through the tricuspid valve and possibly causing the patient's feeling of dizziness and near syncope the patient's vascular team has been notified and at this point they are going to evaluate for optimization of her treatment.      Interval Problem Update  Stop IV antibiotics.  Continue hemodialysis  Monitor fluid status  Continue with pain management  Vascular surgery consult for evaluation of the venous port.  This port has been in place apparently more more than 4 years    Consultants/Specialty  Vascular surgery, nephrology    Code Status  Full code    Disposition  To be determined    Review of Systems  Review of Systems   Constitutional: Negative.  Negative for chills, diaphoresis and fever.   HENT: Negative.    Eyes: Negative.  Negative for double vision.   Respiratory: Negative.  Negative for cough, hemoptysis and wheezing.    Cardiovascular: Negative.  Negative for chest pain, palpitations and leg swelling.   Gastrointestinal: Negative.  Negative for abdominal pain, blood in stool, constipation, diarrhea, heartburn, nausea and vomiting.   Genitourinary: Negative.  Negative for frequency, hematuria and urgency.   Musculoskeletal: Negative.  Negative for joint pain.   Skin: Positive for itching. Negative for rash.    Neurological: Positive for weakness. Negative for dizziness, focal weakness, seizures, loss of consciousness and headaches.   Endo/Heme/Allergies: Negative.  Does not bruise/bleed easily.   Psychiatric/Behavioral: Negative.  Negative for suicidal ideas. The patient is not nervous/anxious.    All other systems reviewed and are negative.       Physical Exam  Temp:  [36.4 °C (97.5 °F)-37.2 °C (98.9 °F)] 36.4 °C (97.6 °F)  Pulse:  [66-82] 79  Resp:  [12-18] 18  BP: (107-123)/(54-74) 116/62  SpO2:  [91 %-100 %] 91 %    Physical Exam   Constitutional: She is oriented to person, place, and time. She appears well-developed and well-nourished.   HENT:   Head: Normocephalic and atraumatic.   Right Ear: External ear normal.   Left Ear: External ear normal.   Nose: Nose normal.   Mouth/Throat: Oropharynx is clear and moist.   Eyes: Pupils are equal, round, and reactive to light. Conjunctivae and EOM are normal.   Neck: Normal range of motion. Neck supple. No JVD present. No thyromegaly present.   Cardiovascular: Normal rate and regular rhythm.   Murmur heard.      Pulmonary/Chest: Effort normal and breath sounds normal. She has no wheezes. She has no rales. She exhibits no tenderness.   Abdominal: Soft. Bowel sounds are normal. She exhibits no distension and no mass. There is no tenderness. There is no rebound and no guarding.   Musculoskeletal: Normal range of motion. She exhibits no edema or tenderness.   Lymphadenopathy:     She has no cervical adenopathy.   Neurological: She is alert and oriented to person, place, and time. She has normal reflexes. No cranial nerve deficit.   Skin: Skin is warm and dry. No rash noted. No erythema.   Psychiatric: She has a normal mood and affect. Her behavior is normal. Judgment and thought content normal.   Nursing note and vitals reviewed.      Fluids    Intake/Output Summary (Last 24 hours) at 8/20/2019 1406  Last data filed at 8/20/2019 0900  Gross per 24 hour   Intake 420 ml   Output  --   Net 420 ml       Laboratory  Recent Labs     08/18/19  0555 08/19/19  0310 08/20/19  0415   WBC 5.8 7.1 4.8   RBC 3.33* 3.23* 3.17*   HEMOGLOBIN 10.2* 10.0* 9.7*   HEMATOCRIT 31.6* 30.3* 30.4*   MCV 94.9 93.8 95.9   MCH 30.6 31.0 30.6   MCHC 32.3* 33.0* 31.9*   RDW 41.1 39.9 41.1   PLATELETCT 116* 139* 138*   MPV 11.4 11.0 10.9     Recent Labs     08/18/19  0555 08/19/19  0310 08/20/19  0415   SODIUM 134* 134* 137   POTASSIUM 4.4 4.8 4.6   CHLORIDE 95* 94* 96   CO2 23 23 27   GLUCOSE 110* 98 86   BUN 63* 72* 38*   CREATININE 11.30* 13.31* 9.25*   CALCIUM 8.7 8.8 9.3                   Imaging  EC-GAYATHRI W/O CONT         MR-BRAIN-W/O   Final Result         1.  Mild periventricular and juxtacortical white matter changes consistent with chronic microvascular ischemic change, gliosis, or less likely demyelination. No change from previous exam.      CT-CHEST (THORAX) W/O   Final Result         1. Limited evaluation due to lack of contrast. The pulmonary artery cannot be evaluated.      Diffuse groundglass opacities throughout both lungs, most in the left upper lobe. This is nonspecific and could relate to pulmonary edema or atypical infection. Septic emboli is less likely based on this appearance.      2. Left chest port with tip in the right atrium.      EC-ECHOCARDIOGRAM LTD W/O CONT   Final Result      EC-ECHOCARDIOGRAM COMPLETE W/O CONT   Final Result      CT-HEAD W/O   Final Result      1.  Head CT without contrast within normal limits. No evidence of acute cerebral infarction, hemorrhage or mass lesion.      DX-CHEST-PORTABLE (1 VIEW)   Final Result      1.  Interstitial edema.      2.  Cardiomegaly.           Assessment/Plan  * Acute respiratory failure with hypoxia (HCC)- (present on admission)  Assessment & Plan  Continue at this point with oxygen support and nebulizer treatments.    Endocarditis of tricuspid valve- (present on admission)  Assessment & Plan  Endocarditis of the valves at this point has been  ruled out.  Stop at this point IV antibiotics  The GAYATHRI at this point shows that the patient's venous port extends through the valve and is causing possibly the patient's chest pain, dizziness.      Avascular necrosis of bones of both hips ([facility])- (present on admission)  Assessment & Plan  Continue with pain management.    ESRD (end stage renal disease) on dialysis ([facility])- (present on admission)  Assessment & Plan  Continue with hemodialysis every Monday Wednesday Friday.    Azotemia  Assessment & Plan  Resolved most recent fluid status is positive.    Seizures ([facility])  Assessment & Plan  Continue with chronic seizure management.  Patient is on Vimpat and has not had any seizures while here.    Chronic lupus nephritis ([facility])- (present on admission)  Assessment & Plan  Patient remains on Plaquenil for this condition.    Hypertension  Assessment & Plan  Continue with blood pressure management optimization to keep systolic blood pressure less than 140 diastolic under 90.  Continue with Norvasc 10 mg daily, continue with hydralazine as needed    Thrombocytopenia ([facility])- (present on admission)  Assessment & Plan  Platelet count at this point is low continue to monitor currently no transfusion is indicated.       VTE prophylaxis: Heparin

## 2019-08-21 ENCOUNTER — PATIENT OUTREACH (OUTPATIENT)
Dept: HEALTH INFORMATION MANAGEMENT | Facility: OTHER | Age: 39
End: 2019-08-21

## 2019-08-21 VITALS
HEART RATE: 76 BPM | SYSTOLIC BLOOD PRESSURE: 119 MMHG | HEIGHT: 65 IN | WEIGHT: 169.31 LBS | OXYGEN SATURATION: 95 % | BODY MASS INDEX: 28.21 KG/M2 | TEMPERATURE: 98.8 F | DIASTOLIC BLOOD PRESSURE: 64 MMHG | RESPIRATION RATE: 18 BRPM

## 2019-08-21 LAB
ANION GAP SERPL CALC-SCNC: 14 MMOL/L (ref 0–11.9)
BACTERIA BLD CULT: NORMAL
BACTERIA BLD CULT: NORMAL
BASOPHILS # BLD AUTO: 0.9 % (ref 0–1.8)
BASOPHILS # BLD: 0.04 K/UL (ref 0–0.12)
BUN SERPL-MCNC: 50 MG/DL (ref 8–22)
CALCIUM SERPL-MCNC: 9.4 MG/DL (ref 8.4–10.2)
CHLORIDE SERPL-SCNC: 92 MMOL/L (ref 96–112)
CO2 SERPL-SCNC: 24 MMOL/L (ref 20–33)
CREAT SERPL-MCNC: 11.69 MG/DL (ref 0.5–1.4)
EOSINOPHIL # BLD AUTO: 0.19 K/UL (ref 0–0.51)
EOSINOPHIL NFR BLD: 4.1 % (ref 0–6.9)
ERYTHROCYTE [DISTWIDTH] IN BLOOD BY AUTOMATED COUNT: 42.7 FL (ref 35.9–50)
GLUCOSE SERPL-MCNC: 90 MG/DL (ref 65–99)
HCT VFR BLD AUTO: 31.6 % (ref 37–47)
HGB BLD-MCNC: 10.1 G/DL (ref 12–16)
IMM GRANULOCYTES # BLD AUTO: 0.18 K/UL (ref 0–0.11)
IMM GRANULOCYTES NFR BLD AUTO: 3.9 % (ref 0–0.9)
LYMPHOCYTES # BLD AUTO: 1.2 K/UL (ref 1–4.8)
LYMPHOCYTES NFR BLD: 26 % (ref 22–41)
MCH RBC QN AUTO: 31.4 PG (ref 27–33)
MCHC RBC AUTO-ENTMCNC: 32 G/DL (ref 33.6–35)
MCV RBC AUTO: 98.1 FL (ref 81.4–97.8)
MONOCYTES # BLD AUTO: 0.69 K/UL (ref 0–0.85)
MONOCYTES NFR BLD AUTO: 15 % (ref 0–13.4)
NEUTROPHILS # BLD AUTO: 2.31 K/UL (ref 2–7.15)
NEUTROPHILS NFR BLD: 50.1 % (ref 44–72)
NRBC # BLD AUTO: 0 K/UL
NRBC BLD-RTO: 0 /100 WBC
PHOSPHATE SERPL-MCNC: 5.4 MG/DL (ref 2.5–4.5)
PLATELET # BLD AUTO: 156 K/UL (ref 164–446)
PMV BLD AUTO: 10.5 FL (ref 9–12.9)
POTASSIUM SERPL-SCNC: 4.6 MMOL/L (ref 3.6–5.5)
RBC # BLD AUTO: 3.22 M/UL (ref 4.2–5.4)
SIGNIFICANT IND 70042: NORMAL
SIGNIFICANT IND 70042: NORMAL
SITE SITE: NORMAL
SITE SITE: NORMAL
SODIUM SERPL-SCNC: 130 MMOL/L (ref 135–145)
SOURCE SOURCE: NORMAL
SOURCE SOURCE: NORMAL
WBC # BLD AUTO: 4.6 K/UL (ref 4.8–10.8)

## 2019-08-21 PROCEDURE — 90935 HEMODIALYSIS ONE EVALUATION: CPT

## 2019-08-21 PROCEDURE — 99239 HOSP IP/OBS DSCHRG MGMT >30: CPT | Performed by: HOSPITALIST

## 2019-08-21 PROCEDURE — 700101 HCHG RX REV CODE 250: Performed by: INTERNAL MEDICINE

## 2019-08-21 PROCEDURE — 84100 ASSAY OF PHOSPHORUS: CPT

## 2019-08-21 PROCEDURE — 700111 HCHG RX REV CODE 636 W/ 250 OVERRIDE (IP): Performed by: INTERNAL MEDICINE

## 2019-08-21 PROCEDURE — 700112 HCHG RX REV CODE 229: Performed by: INTERNAL MEDICINE

## 2019-08-21 PROCEDURE — 700102 HCHG RX REV CODE 250 W/ 637 OVERRIDE(OP): Performed by: INTERNAL MEDICINE

## 2019-08-21 PROCEDURE — A9270 NON-COVERED ITEM OR SERVICE: HCPCS | Performed by: INTERNAL MEDICINE

## 2019-08-21 PROCEDURE — 85025 COMPLETE CBC W/AUTO DIFF WBC: CPT

## 2019-08-21 PROCEDURE — 90935 HEMODIALYSIS ONE EVALUATION: CPT | Performed by: INTERNAL MEDICINE

## 2019-08-21 PROCEDURE — 80048 BASIC METABOLIC PNL TOTAL CA: CPT

## 2019-08-21 RX ADMIN — CHOLECALCIFEROL (VITAMIN D3) 10 MCG (400 UNIT) TABLET 400 UNITS: at 05:19

## 2019-08-21 RX ADMIN — HYDROXYCHLOROQUINE SULFATE 200 MG: 200 TABLET, FILM COATED ORAL at 05:19

## 2019-08-21 RX ADMIN — SEVELAMER CARBONATE 3200 MG: 800 TABLET, FILM COATED ORAL at 08:03

## 2019-08-21 RX ADMIN — OXYCODONE HYDROCHLORIDE 20 MG: 10 TABLET ORAL at 09:17

## 2019-08-21 RX ADMIN — PREGABALIN 75 MG: 75 CAPSULE ORAL at 12:07

## 2019-08-21 RX ADMIN — LACOSAMIDE 100 MG: 50 TABLET, FILM COATED ORAL at 05:19

## 2019-08-21 RX ADMIN — PREGABALIN 75 MG: 75 CAPSULE ORAL at 05:19

## 2019-08-21 RX ADMIN — SENNOSIDES AND DOCUSATE SODIUM 2 TABLET: 8.6; 5 TABLET ORAL at 05:19

## 2019-08-21 RX ADMIN — SEVELAMER CARBONATE 3200 MG: 800 TABLET, FILM COATED ORAL at 12:07

## 2019-08-21 RX ADMIN — DOCUSATE SODIUM 100 MG: 100 CAPSULE, LIQUID FILLED ORAL at 05:19

## 2019-08-21 RX ADMIN — LIDOCAINE 1 PATCH: 50 PATCH TOPICAL at 09:18

## 2019-08-21 RX ADMIN — EPOETIN ALFA 2000 UNITS: 2000 SOLUTION INTRAVENOUS; SUBCUTANEOUS at 12:06

## 2019-08-21 RX ADMIN — OMEPRAZOLE 20 MG: 20 CAPSULE, DELAYED RELEASE ORAL at 05:19

## 2019-08-21 RX ADMIN — OXYCODONE HYDROCHLORIDE AND ACETAMINOPHEN 500 MG: 500 TABLET ORAL at 05:19

## 2019-08-21 RX ADMIN — OXYCODONE HYDROCHLORIDE 20 MG: 10 TABLET ORAL at 13:21

## 2019-08-21 RX ADMIN — EPOETIN ALFA 2000 UNITS: 2000 SOLUTION INTRAVENOUS; SUBCUTANEOUS at 12:07

## 2019-08-21 RX ADMIN — FERROUS SULFATE TAB 325 MG (65 MG ELEMENTAL FE) 325 MG: 325 (65 FE) TAB at 05:19

## 2019-08-21 ASSESSMENT — ENCOUNTER SYMPTOMS
PALPITATIONS: 0
VOMITING: 0
FLANK PAIN: 0
ORTHOPNEA: 0
NAUSEA: 0
ABDOMINAL PAIN: 0
FEVER: 0
WHEEZING: 0
BACK PAIN: 1
DIARRHEA: 0
CHILLS: 0
SINUS PAIN: 0
EYES NEGATIVE: 1
COUGH: 0
HEMOPTYSIS: 0
SHORTNESS OF BREATH: 0
WEIGHT LOSS: 0

## 2019-08-21 NOTE — PROGRESS NOTES
Telemetry Shift Summary    Rhythm SR,ST  HR Range   Ectopy none  Measurements 0.16/0.08/0.40        Normal Values  Rhythm SR  HR Range    Measurements 0.12-0.20 / 0.06-0.10  / 0.30-0.52

## 2019-08-21 NOTE — PROGRESS NOTES
Spoke with RIKI Andrews MD about pt wanting to know if we could de-access port and pt not have any IV access.  Pt is not getting any IV fluids, H.MD Darryl said it was okay to de-access and pt not have IV access at this time.

## 2019-08-21 NOTE — PROGRESS NOTES
Telemetry Shift Summary    Rhythm SR/ST  HR Range   Ectopy none  Measurements .14/.08/.32          Normal Values  Rhythm SR  HR Range    Measurements 0.12-0.20 / 0.06-0.10  / 0.30-0.52

## 2019-08-21 NOTE — PROGRESS NOTES
Hemodialysis treatment ordered today per Dr Shaver x 3.5 hours. Treatment initiated at 0936, ended at 1306.     Patient tolerated tx; see paper flow sheet for details.     Net UF 1,000 mL.     Needles removed from access site. Dressings applied and sites held x 10 minutes; verified no bleeding. Positive bruit/thrill post tx. Staff RN to monitor AVF for breakthrough bleeding. Should breakthrough bleeding occur, staff RN to apply pressure to access sites until bleeding resolved. Notify Dialysis and Nephrologist for follow-up.    Report given to Primary RN.

## 2019-08-21 NOTE — PROGRESS NOTES
Nephrology Daily Progress Note    Date of Service  8/20/2019    Chief Complaint  38 y.o. female with ESRD on HD MWF admitted 8/12/2019 with twitching, missed dialysis    Interval Problem Update  8/13 - Patient got HD last night with 2.7L UF and HD again this morning with 3L UF. Seen this afternoon, remains somewhat confused. Denies SOB. Denies chest pain.   8/14 -patient had dialysis #2 yesterday with 3 L removed.  Patient had dialysis this morning, complicated by hypotension, only able to remove 1 L.  Patient seen this afternoon, says she is feeling much better.  Denies chest pain, shortness of breath.  8/15 -no new complaints today.  Patient denies shortness of breath, chest pain.  8/18 - no new complaints today. Denies SOB, chest pain.  8/19- patient seen and examined during dialysis -tolerates well           Scheduled for GAYATHRI tomorrow  8/20  GAYATHRI negative for vegetations -found venous port passing through tricuspid valve-causing some near syncopy            No new complaints            HD MWF  Review of Systems  Review of Systems   Constitutional: Positive for malaise/fatigue. Negative for chills, fever and weight loss.   HENT: Negative for congestion, hearing loss and sinus pain.    Eyes: Negative.    Respiratory: Negative for cough, hemoptysis, shortness of breath and wheezing.    Cardiovascular: Negative for chest pain, palpitations, orthopnea and leg swelling.   Gastrointestinal: Negative for nausea and vomiting.   Musculoskeletal: Positive for back pain and joint pain. Negative for myalgias.   Skin: Negative.    All other systems reviewed and are negative.       Physical Exam  Temp:  [36.4 °C (97.5 °F)-37.2 °C (98.9 °F)] 36.4 °C (97.6 °F)  Pulse:  [66-81] 75  Resp:  [12-18] 16  BP: (107-123)/(54-74) 114/64  SpO2:  [91 %-100 %] 91 %    Physical Exam   Constitutional: She is oriented to person, place, and time. She appears well-developed and well-nourished. No distress.   HENT:   Head: Normocephalic and  atraumatic.   Nose: Nose normal.   Mouth/Throat: Oropharynx is clear and moist.   Eyes: Pupils are equal, round, and reactive to light. Conjunctivae and EOM are normal.   Neck: Normal range of motion. Neck supple. No thyromegaly present.   Cardiovascular: Normal rate and regular rhythm. Exam reveals no gallop and no friction rub.   Pulmonary/Chest: Effort normal and breath sounds normal. No respiratory distress. She has no wheezes. She has no rales.   Abdominal: Soft. Bowel sounds are normal. She exhibits no distension and no mass. There is no tenderness. There is no guarding.   Musculoskeletal: She exhibits no edema.   Neurological: She is alert and oriented to person, place, and time. No cranial nerve deficit.   Skin: Skin is warm. No rash noted. No erythema.   Nursing note and vitals reviewed.  Access: Right upper extremity AV graft with a patent bruit and thrill      Fluids    Intake/Output Summary (Last 24 hours) at 8/20/2019 1813  Last data filed at 8/20/2019 0900  Gross per 24 hour   Intake 420 ml   Output --   Net 420 ml       Laboratory  Labs reviewed, pertinent labs below.  Recent Labs     08/18/19  0555 08/19/19  0310 08/20/19  0415   WBC 5.8 7.1 4.8   RBC 3.33* 3.23* 3.17*   HEMOGLOBIN 10.2* 10.0* 9.7*   HEMATOCRIT 31.6* 30.3* 30.4*   MCV 94.9 93.8 95.9   MCH 30.6 31.0 30.6   MCHC 32.3* 33.0* 31.9*   RDW 41.1 39.9 41.1   PLATELETCT 116* 139* 138*   MPV 11.4 11.0 10.9     Recent Labs     08/18/19  0555 08/19/19  0310 08/20/19  0415   SODIUM 134* 134* 137   POTASSIUM 4.4 4.8 4.6   CHLORIDE 95* 94* 96   CO2 23 23 27   GLUCOSE 110* 98 86   BUN 63* 72* 38*   CREATININE 11.30* 13.31* 9.25*   CALCIUM 8.7 8.8 9.3               URINALYSIS:  Lab Results   Component Value Date/Time    COLORURINE Yellow 08/12/2019 1459    CLARITY Clear 08/12/2019 1459    SPECGRAVITY 1.020 08/12/2019 1459    PHURINE 7.5 08/12/2019 1459    KETONES 15 (A) 08/12/2019 1459    PROTEINURIN >=300 (A) 08/12/2019 1459    BILIRUBINUR Negative  08/12/2019 1459    UROBILU 0.2 01/01/2019 0705    NITRITE Negative 08/12/2019 1459    LEUKESTERAS Negative 08/12/2019 1459    OCCULTBLOOD Small (A) 08/12/2019 1459     UPC  Lab Results   Component Value Date/Time    TOTPROTUR 65.0 (H) 01/21/2015 1520      Lab Results   Component Value Date/Time    CREATININEU 65.60 01/21/2015 1520         Imaging reviewed  EC-GAYATHRI W/O CONT   Final Result      MR-BRAIN-W/O   Final Result         1.  Mild periventricular and juxtacortical white matter changes consistent with chronic microvascular ischemic change, gliosis, or less likely demyelination. No change from previous exam.      CT-CHEST (THORAX) W/O   Final Result         1. Limited evaluation due to lack of contrast. The pulmonary artery cannot be evaluated.      Diffuse groundglass opacities throughout both lungs, most in the left upper lobe. This is nonspecific and could relate to pulmonary edema or atypical infection. Septic emboli is less likely based on this appearance.      2. Left chest port with tip in the right atrium.      EC-ECHOCARDIOGRAM LTD W/O CONT   Final Result      EC-ECHOCARDIOGRAM COMPLETE W/O CONT   Final Result      CT-HEAD W/O   Final Result      1.  Head CT without contrast within normal limits. No evidence of acute cerebral infarction, hemorrhage or mass lesion.      DX-CHEST-PORTABLE (1 VIEW)   Final Result      1.  Interstitial edema.      2.  Cardiomegaly.            Current Facility-Administered Medications   Medication Dose Route Frequency Provider Last Rate Last Dose   • lactated ringers infusion   Intravenous Continuous XIOMARA AntonioO.   Stopped at 08/20/19 0830   • fentaNYL (SUBLIMAZE) injection 25 mcg  25 mcg Intravenous Q2 MIN PRN Kaleb Kimbrough D.O.        Or   • fentaNYL (SUBLIMAZE) injection 50 mcg  50 mcg Intravenous Q2 MIN PRN AKASH Antonio.O.       • albuterol (PROVENTIL) 2.5mg/0.5ml nebulizer solution 2.5 mg  2.5 mg Inhalation Q10 MIN PRN XIOMARA AntonioO.       • NS  infusion   Intravenous Continuous Kaleb Kimbrough D.O.   Stopped at 08/20/19 0830   • ondansetron (ZOFRAN) syringe/vial injection 4 mg  4 mg Intravenous Once PRN Kaleb Kimbrough D.O.       • [START ON 8/21/2019] epoetin rk (EPOGEN,PROCRIT) injection 2,000 Units  2,000 Units Intravenous MO, WE + FR Leana Shaver M.D.   2,000 Units at 08/19/19 1230    And   • [START ON 8/21/2019] epoetin rk (EPOGEN,PROCRIT) injection 2,000 Units  2,000 Units Intravenous MO, WE + FR Leana Shaver M.D.   2,000 Units at 08/19/19 1230   • pregabalin (LYRICA) capsule 75 mg  75 mg Oral TID Martina Leigh M.D.   75 mg at 08/20/19 1715   • hydroxychloroquine (PLAQUENIL) tablet 200 mg  200 mg Oral DAILY Martina Leigh M.D.   Stopped at 08/20/19 0600   • sevelamer carbonate (RENVELA) tablet 3,200 mg  3,200 mg Oral TID WITH MEALS Martina Leigh M.D.   3,200 mg at 08/20/19 1709   • heparin pf injection 300-500 Units  300-500 Units Intracatheter PRN Martina Leigh M.D.   500 Units at 08/17/19 0120   • hydrOXYzine HCl (ATARAX) tablet 10 mg  10 mg Oral TID PRN Norma Mcguire M.D.   10 mg at 08/19/19 2237   • calcium carbonate (TUMS) chewable tab 500 mg  500 mg Oral Q8HRS PRN Martina Leigh M.D.   500 mg at 08/17/19 1029   • heparin injection 5,000 Units  5,000 Units Subcutaneous Q12HRS Martina Leigh M.D.   5,000 Units at 08/19/19 1841   • traZODone (DESYREL) tablet 50 mg  50 mg Oral QHS PRN Martina Leigh M.D.   50 mg at 08/19/19 2237   • ondansetron (ZOFRAN) syringe/vial injection 4 mg  4 mg Intravenous Q4HRS PRN Cornel Little M.D.   4 mg at 08/19/19 0306   • oxyCODONE immediate release (ROXICODONE) tablet 20 mg  20 mg Oral 4X/DAY Cornel Little M.D.   20 mg at 08/20/19 1710   • LORazepam (ATIVAN) injection 0.5 mg  0.5 mg Intravenous Q4HRS PRN Orlin Louis D.O.       • amLODIPine (NORVASC) tablet 10 mg  10 mg Oral QDAY PRN Cornel Little M.D.   10 mg at 08/14/19 0126   • ascorbic acid tablet 500 mg  500 mg Oral DAILY Cornel  RAMON Little   Stopped at 08/19/19 0600   • cholecalciferol (VITAMIN D3) tablet 400 Units  400 Units Oral DAILY Cornel Little M.D.   Stopped at 08/19/19 0600   • docusate sodium (COLACE) capsule 100 mg  100 mg Oral DAILY Cornel Little M.D.   Stopped at 08/19/19 0600   • ferrous sulfate tablet 325 mg  325 mg Oral DAILY Cornel Little M.D.   Stopped at 08/19/19 0600   • lacosamide (VIMPAT) tablet 100 mg  100 mg Oral BID Cornel Little M.D.   100 mg at 08/20/19 1709   • lidocaine (LIDODERM) 5 % 1 Patch  1 Patch Transdermal Q24HR Cornel Little M.D.   1 Patch at 08/20/19 1024   • omeprazole (PRILOSEC) capsule 20 mg  20 mg Oral DAILY Cornel Little M.D.   Stopped at 08/19/19 0600   • NS (BOLUS) infusion 250 mL  250 mL Intravenous DIALYSIS PRN Bora Douglas M.D.       • albumin human 25% solution 12.5 g  12.5 g Intravenous DIALYSIS PRN Bora Douglas M.D. 150 mL/hr at 08/16/19 1452 12.5 g at 08/16/19 1452   • lidocaine (XYLOCAINE) 1 % injection 1 mL  1 mL Intradermal PRN Bora Douglas M.D.       • senna-docusate (PERICOLACE or SENOKOT S) 8.6-50 MG per tablet 2 Tab  2 Tab Oral BID Cornel Little M.D.   Stopped at 08/20/19 0600    And   • polyethylene glycol/lytes (MIRALAX) PACKET 1 Packet  1 Packet Oral QDAY PRN Cornel Little M.D.        And   • magnesium hydroxide (MILK OF MAGNESIA) suspension 30 mL  30 mL Oral QDAY PRN Cornel Little M.D.        And   • bisacodyl (DULCOLAX) suppository 10 mg  10 mg Rectal QDAY PRN Cornel Little M.D.       • enalaprilat (VASOTEC) injection 1.25 mg  1.25 mg Intravenous Q6HRS PRCAMI Little M.D.   1.25 mg at 08/14/19 0404   • hydrALAZINE (APRESOLINE) injection 20 mg  20 mg Intravenous Q6HRS PRN Cornel Little M.D.   20 mg at 08/14/19 0115         Assessment/Plan  38 y.o. Female with ESRD on HD who presented 8/12/2019 with twitching, missed HD.      1. ESRD/HD -MWF    2. Uremic symptoms resolved with  HD     3. HTN: BP well controlled    4. Anemia of CKD - Hb to monitor    5. Bacteremia with TV vegetation -negative per GAYATHRI    6. Electrolytes : well controlled    Recs: HD MWF, renal diet, all meds to adjust to renal doses

## 2019-08-21 NOTE — PROGRESS NOTES
Port de-accessed per policy. Flushed with saline and heparin locked with 500 units per policy.  Pt tolerated well, site is clean, dry, covered with gauze and paper tape.

## 2019-08-21 NOTE — CARE PLAN
Problem: Communication  Goal: The ability to communicate needs accurately and effectively will improve  Outcome: PROGRESSING AS EXPECTED  POC updated with pt, questions answered, pt given time to voice concerns, no additional questions at this time.       Problem: Venous Thromboembolism (VTW)/Deep Vein Thrombosis (DVT) Prevention:  Goal: Patient will participate in Venous Thrombosis (VTE)/Deep Vein Thrombosis (DVT)Prevention Measures  Outcome: PROGRESSING AS EXPECTED  Heparin SubQ ordered, pt ambulates

## 2019-08-21 NOTE — PROGRESS NOTES
Report received from JOSETTE Palacio.    Patient awake laying in bed. Morning assessment complete. POC discussed with patient. No concerns or complaints at this time. Safety precautions in place. Bed locked and in lowest position, belongings and call bell within reach.

## 2019-08-21 NOTE — PROGRESS NOTES
Nephrology Daily Progress Note    Date of Service  8/21/2019    Chief Complaint  38 y.o. female with ESRD on HD MWF admitted 8/12/2019 with twitching, missed dialysis    Interval Problem Update  8/13 - Patient got HD last night with 2.7L UF and HD again this morning with 3L UF. Seen this afternoon, remains somewhat confused. Denies SOB. Denies chest pain.   8/14 -patient had dialysis #2 yesterday with 3 L removed.  Patient had dialysis this morning, complicated by hypotension, only able to remove 1 L.  Patient seen this afternoon, says she is feeling much better.  Denies chest pain, shortness of breath.  8/15 -no new complaints today.  Patient denies shortness of breath, chest pain.  8/18 - no new complaints today. Denies SOB, chest pain.  8/19- patient seen and examined during dialysis -tolerates well           Scheduled for GAYATHRI tomorrow  8/20  GAYATHRI negative for vegetations -found venous port passing through tricuspid valve-causing some near syncopy            No new complaints            HD MWF  8/21   Doing well, no complaints            Seen and examined during dialysis -tolerates well  Review of Systems  Review of Systems   Constitutional: Negative for chills, fever, malaise/fatigue and weight loss.   HENT: Negative for congestion, hearing loss and sinus pain.    Eyes: Negative.    Respiratory: Negative for cough, hemoptysis, shortness of breath and wheezing.    Cardiovascular: Negative for chest pain, palpitations, orthopnea and leg swelling.   Gastrointestinal: Negative for abdominal pain, diarrhea, nausea and vomiting.   Genitourinary: Negative for dysuria, flank pain, frequency, hematuria and urgency.   Musculoskeletal: Positive for back pain and joint pain.   Skin: Negative.    All other systems reviewed and are negative.       Physical Exam  Temp:  [36.4 °C (97.6 °F)-36.7 °C (98 °F)] 36.7 °C (98 °F)  Pulse:  [74-90] 77  Resp:  [16-18] 18  BP: (111-125)/(64-77) 111/69  SpO2:  [92 %-98 %] 94 %    Physical  Exam   Constitutional: She is oriented to person, place, and time. She appears well-developed and well-nourished. No distress.   HENT:   Head: Normocephalic and atraumatic.   Nose: Nose normal.   Mouth/Throat: Oropharynx is clear and moist.   Eyes: Pupils are equal, round, and reactive to light. Conjunctivae and EOM are normal.   Neck: Normal range of motion. Neck supple. No thyromegaly present.   Cardiovascular: Normal rate and regular rhythm. Exam reveals no gallop and no friction rub.   Pulmonary/Chest: Effort normal and breath sounds normal. No respiratory distress. She has no wheezes. She has no rales.   Abdominal: Soft. Bowel sounds are normal. She exhibits no distension and no mass. There is no tenderness. There is no guarding.   Musculoskeletal: She exhibits no edema.   Neurological: She is alert and oriented to person, place, and time. No cranial nerve deficit.   Skin: Skin is warm. No rash noted. No erythema.   Nursing note and vitals reviewed.  Access: Right upper extremity AV graft with a patent bruit and thrill      Fluids    Intake/Output Summary (Last 24 hours) at 8/21/2019 1305  Last data filed at 8/21/2019 0900  Gross per 24 hour   Intake 240 ml   Output --   Net 240 ml       Laboratory  Labs reviewed, pertinent labs below.  Recent Labs     08/19/19 0310 08/20/19 0415 08/21/19 0426   WBC 7.1 4.8 4.6*   RBC 3.23* 3.17* 3.22*   HEMOGLOBIN 10.0* 9.7* 10.1*   HEMATOCRIT 30.3* 30.4* 31.6*   MCV 93.8 95.9 98.1*   MCH 31.0 30.6 31.4   MCHC 33.0* 31.9* 32.0*   RDW 39.9 41.1 42.7   PLATELETCT 139* 138* 156*   MPV 11.0 10.9 10.5     Recent Labs     08/19/19 0310 08/20/19 0415 08/21/19 0426   SODIUM 134* 137 130*   POTASSIUM 4.8 4.6 4.6   CHLORIDE 94* 96 92*   CO2 23 27 24   GLUCOSE 98 86 90   BUN 72* 38* 50*   CREATININE 13.31* 9.25* 11.69*   CALCIUM 8.8 9.3 9.4               URINALYSIS:  Lab Results   Component Value Date/Time    COLORURINE Yellow 08/12/2019 1459    CLARITY Clear 08/12/2019 1459     SPECGRAVITY 1.020 08/12/2019 1459    PHURINE 7.5 08/12/2019 1459    KETONES 15 (A) 08/12/2019 1459    PROTEINURIN >=300 (A) 08/12/2019 1459    BILIRUBINUR Negative 08/12/2019 1459    UROBILU 0.2 01/01/2019 0705    NITRITE Negative 08/12/2019 1459    LEUKESTERAS Negative 08/12/2019 1459    OCCULTBLOOD Small (A) 08/12/2019 1459     UPC  Lab Results   Component Value Date/Time    TOTPROTUR 65.0 (H) 01/21/2015 1520      Lab Results   Component Value Date/Time    CREATININEU 65.60 01/21/2015 1520         Imaging reviewed  EC-GAYATHRI W/O CONT   Final Result      MR-BRAIN-W/O   Final Result         1.  Mild periventricular and juxtacortical white matter changes consistent with chronic microvascular ischemic change, gliosis, or less likely demyelination. No change from previous exam.      CT-CHEST (THORAX) W/O   Final Result         1. Limited evaluation due to lack of contrast. The pulmonary artery cannot be evaluated.      Diffuse groundglass opacities throughout both lungs, most in the left upper lobe. This is nonspecific and could relate to pulmonary edema or atypical infection. Septic emboli is less likely based on this appearance.      2. Left chest port with tip in the right atrium.      EC-ECHOCARDIOGRAM LTD W/O CONT   Final Result      EC-ECHOCARDIOGRAM COMPLETE W/O CONT   Final Result      CT-HEAD W/O   Final Result      1.  Head CT without contrast within normal limits. No evidence of acute cerebral infarction, hemorrhage or mass lesion.      DX-CHEST-PORTABLE (1 VIEW)   Final Result      1.  Interstitial edema.      2.  Cardiomegaly.            Current Facility-Administered Medications   Medication Dose Route Frequency Provider Last Rate Last Dose   • lactated ringers infusion   Intravenous Continuous Kaleb Kimbrough D.O.   Stopped at 08/20/19 0830   • fentaNYL (SUBLIMAZE) injection 25 mcg  25 mcg Intravenous Q2 MIN PRN Kaleb Kimbrough D.O.        Or   • fentaNYL (SUBLIMAZE) injection 50 mcg  50 mcg Intravenous Q2  MIN PRN Kaleb Kimbrough D.O.       • albuterol (PROVENTIL) 2.5mg/0.5ml nebulizer solution 2.5 mg  2.5 mg Inhalation Q10 MIN PRN Kaleb Kimbrough D.O.       • NS infusion   Intravenous Continuous Kaleb Kimbrough D.O.   Stopped at 08/20/19 0830   • ondansetron (ZOFRAN) syringe/vial injection 4 mg  4 mg Intravenous Once PRN Kaleb Kimbrough D.O.       • epoetin rk (EPOGEN,PROCRIT) injection 2,000 Units  2,000 Units Intravenous MO, WE + FR Leana Shaver M.D.   2,000 Units at 08/21/19 1207    And   • epoetin rk (EPOGEN,PROCRIT) injection 2,000 Units  2,000 Units Intravenous MO, WE + FR Leana Shaver M.D.   2,000 Units at 08/21/19 1206   • pregabalin (LYRICA) capsule 75 mg  75 mg Oral TID Martina Leigh M.D.   75 mg at 08/21/19 1207   • hydroxychloroquine (PLAQUENIL) tablet 200 mg  200 mg Oral DAILY Martina Leigh M.D.   200 mg at 08/21/19 0519   • sevelamer carbonate (RENVELA) tablet 3,200 mg  3,200 mg Oral TID WITH MEALS Martina Leigh M.D.   3,200 mg at 08/21/19 1207   • heparin pf injection 300-500 Units  300-500 Units Intracatheter PRN Martina Leigh M.D.   500 Units at 08/20/19 2221   • hydrOXYzine HCl (ATARAX) tablet 10 mg  10 mg Oral TID PRN Norma Mcguire M.D.   10 mg at 08/20/19 2231   • calcium carbonate (TUMS) chewable tab 500 mg  500 mg Oral Q8HRS PRN Martina Leigh M.D.   500 mg at 08/17/19 1029   • heparin injection 5,000 Units  5,000 Units Subcutaneous Q12HRS Martina Leigh M.D.   5,000 Units at 08/19/19 1841   • traZODone (DESYREL) tablet 50 mg  50 mg Oral QHS PRN Martina Leigh M.D.   50 mg at 08/20/19 2231   • ondansetron (ZOFRAN) syringe/vial injection 4 mg  4 mg Intravenous Q4HRS PRN Cornel Little M.D.   4 mg at 08/19/19 0306   • oxyCODONE immediate release (ROXICODONE) tablet 20 mg  20 mg Oral 4X/DAY Cornel Little M.D.   20 mg at 08/21/19 0917   • LORazepam (ATIVAN) injection 0.5 mg  0.5 mg Intravenous Q4HRS PRN Orlin Louis D.O.       • amLODIPine (NORVASC) tablet 10 mg  10 mg Oral  QDAY PRN Cornel Little M.D.   10 mg at 08/14/19 0126   • ascorbic acid tablet 500 mg  500 mg Oral DAILY Cornel Little M.D.   500 mg at 08/21/19 0519   • cholecalciferol (VITAMIN D3) tablet 400 Units  400 Units Oral DAILY Cornel Little M.D.   400 Units at 08/21/19 0519   • docusate sodium (COLACE) capsule 100 mg  100 mg Oral DAILY Cornel Little M.D.   100 mg at 08/21/19 0519   • ferrous sulfate tablet 325 mg  325 mg Oral DAILY Cornel Little M.D.   325 mg at 08/21/19 0519   • lacosamide (VIMPAT) tablet 100 mg  100 mg Oral BID Cornel Little M.D.   100 mg at 08/21/19 0519   • lidocaine (LIDODERM) 5 % 1 Patch  1 Patch Transdermal Q24HR Cornel Little M.D.   1 Patch at 08/21/19 0918   • omeprazole (PRILOSEC) capsule 20 mg  20 mg Oral DAILY Cornel Little M.D.   20 mg at 08/21/19 0519   • NS (BOLUS) infusion 250 mL  250 mL Intravenous DIALYSIS PRN Bora Douglas M.D.       • albumin human 25% solution 12.5 g  12.5 g Intravenous DIALYSIS PRN Bora Douglas M.D. 150 mL/hr at 08/16/19 1452 12.5 g at 08/16/19 1452   • lidocaine (XYLOCAINE) 1 % injection 1 mL  1 mL Intradermal PRN Bora Douglas M.D.       • senna-docusate (PERICOLACE or SENOKOT S) 8.6-50 MG per tablet 2 Tab  2 Tab Oral BID Cornel Little M.D.   2 Tab at 08/21/19 0519    And   • polyethylene glycol/lytes (MIRALAX) PACKET 1 Packet  1 Packet Oral QDAY PRN Cornel Little M.D.        And   • magnesium hydroxide (MILK OF MAGNESIA) suspension 30 mL  30 mL Oral QDAY PRN Cornel Little M.D.        And   • bisacodyl (DULCOLAX) suppository 10 mg  10 mg Rectal QDAY PRCAMI Little M.D.       • enalaprilat (VASOTEC) injection 1.25 mg  1.25 mg Intravenous Q6HRS PRCAMI Little M.D.   1.25 mg at 08/14/19 0404   • hydrALAZINE (APRESOLINE) injection 20 mg  20 mg Intravenous Q6HRS PRCAMI Little M.D.   20 mg at 08/14/19 0115         Assessment/Plan  38 y.o. Female with ESRD  on HD who presented 8/12/2019 with twitching, missed HD.      1. ESRD/HD -MWF    2. Electrolytes: mild hyponatremia -avoid hypotonic solutions     3. HTN: BP well controlled    4. Anemia of CKD - Hb stable - to monitor    5. Bacteremia with TV vegetation -negative per GAYATHRI        Recs: HD MWF, renal diet, all meds to adjust to renal doses

## 2019-08-21 NOTE — DISCHARGE INSTRUCTIONS
Discharge Instructions per Antonio Andrews M.D.      Please follow up with Dr Jansen for your catheter.       DIET: as before    ACTIVITY: as tolerated    DIAGNOSIS: uremia    Return to ER if your pass out or have any other concern.                 Discharge Instructions    Discharged to home by car with relative. Discharged via wheelchair, hospital escort: Yes.  Special equipment needed: Not Applicable    Be sure to schedule a follow-up appointment with your primary care doctor or any specialists as instructed.     Discharge Plan:   Diet Plan: Discussed  Activity Level: Discussed  Smoking Cessation Offered: Patient Counseled  Confirmed Follow up Appointment: Appointment Scheduled  Confirmed Symptoms Management: Discussed  Medication Reconciliation Updated: Yes  Influenza Vaccine Indication: Patient Refuses    I understand that a diet low in cholesterol, fat, and sodium is recommended for good health. Unless I have been given specific instructions below for another diet, I accept this instruction as my diet prescription.   Other diet: Renal diet    Special Instructions: None    · Is patient discharged on Warfarin / Coumadin?   No     Uremia  Uremia is when the amount of urea in your blood is higher than usual. Urea is a waste product that is normally removed from the body in your urine. Normally, your kidneys filter your blood and make urine. The kidneys also produce chemical messengers (hormones). These hormones help to maintain the balance of other chemicals in your blood (electrolytes), such as potassium and sodium. Uremia develops when your kidneys have lost most of their ability to filter your blood and work well. This is called renal failure. When renal failure occurs, urea and other waste products build up in your blood, and other hormone and electrolyte levels become unbalanced.  Renal failure can happen suddenly (acute renal failure) or over a long period of time (chronic renal failure). Both can cause  uremia, but chronic renal failure does so more often.   CAUSES   Diabetes is the most common cause of renal failure and uremia. The second most common cause is high blood pressure. Other causes include:  · Infection.  · Trauma.  · Tumor.  · Inherited kidney diseases.  · Medicines, including nonsteroidal anti-inflammatory drugs (NSAIDs) and some antibiotics.  · Abnormalities of the body's defense system (autoimmune diseases).  · Decreased blood supply to the kidney.  RISK FACTORS  The biggest risk factor is having poorly controlled diabetes or high blood pressure. Other risk factors include:  · Being  or .  · Being male.  · Being older than 75.  SIGNS AND SYMPTOMS  Uremia can cause lots of symptoms because your kidneys play a role in so many body functions. The most common symptoms of uremia include:  · Feeling tired and having no energy.  · Having no appetite.  · Being thirstier than usual.  · Nausea or vomiting.  · Muscle aches.  · Itchy skin.  · Headache.  · Restless legs.  · Visual changes.  · Depression.  · Trouble sleeping.  DIAGNOSIS   Your health care provider may diagnose uremia based on your symptoms and a physical exam. You will also have tests to confirm the diagnosis of uremia and to show how well your kidneys are filtering your blood. These tests may include:  · Blood and urine tests to check your creatinine level. Creatinine is a waste product that your kidneys normally filter out of your blood.  ¨ You may have uremia if there is not enough creatinine in your urine and too much creatinine in your blood.  ¨ Your health care provider can estimate how well your kidneys are working from the amount of creatinine in your blood.  · Urine tests to check your urine for proteins, blood particles, and other substances that would normally not pass through your kidneys.  · An imaging test using sound waves and a computer (ultrasound) to create a picture of your kidney.  · A kidney  biopsy. Your health care provider uses a needle to get a small piece of kidney tissue to check under a microscope. This is the best way to tell the difference between acute and chronic renal failure.  · Other kinds of blood tests and imaging tests may also be done.  TREATMENT   Treatment usually involves some type of kidney replacement therapy. The two options are to get blood-filtering treatments (dialysis) or to have a kidney transplant. Other treatments may include:   · Having certain glands in your neck (parathyroid glands) removed. These can contribute to itching in uremia.  · Taking a type of medicine called an erythropoiesis-stimulating agent (JUAN MANUEL) to prevent the loss of red blood cells (anemia) that is often caused by uremia.  · Taking medicine to correct your levels of phosphorus and calcium. Uremia can cause these to build up in your blood.  · You may have other treatments depending on the type of renal failure you have and the problems you develop.  HOME CARE INSTRUCTIONS   After you have been diagnosed with uremia, diet and nutrition become an important part of home care, no matter what other treatments you may need. Follow all your health care provider's instructions.  · Only take medicines as directed by your health care provider.  · Do not smoke.  · Limit the amount of alcohol you drink.  · Work with a renal dietitian to make sure you are on the best possible diet.    ¨ Eat a diet low in salt and fat.  · Exercise on most days of the week.  ¨ Frequent short walks may be a good option.  ¨ Ask your health care provider what exercise is safe for you.  · Keep all follow-up appointments.  SEEK MEDICAL CARE IF:  · You have chills or a fever.  · You are struggling to control your symptoms.  SEEK IMMEDIATE MEDICAL CARE IF:  · You have a fever or persistent symptoms for more than 2-3 days.  · You develop any new and troubling symptoms.  · You have vomiting that does not go away.  · You see blood in your  vomit.  · You have trouble breathing.  · You have chest pain.  This information is not intended to replace advice given to you by your health care provider. Make sure you discuss any questions you have with your health care provider.  Document Released: 12/23/2014 Document Reviewed: 12/23/2014  [employer] Interactive Patient Education © 2017 [employer]    Depression / Suicide Risk    As you are discharged from this [facility] facility, it is important to learn how to keep safe from harming yourself.    Recognize the warning signs:  · Abrupt changes in personality, positive or negative- including increase in energy   · Giving away possessions  · Change in eating patterns- significant weight changes-  positive or negative  · Change in sleeping patterns- unable to sleep or sleeping all the time   · Unwillingness or inability to communicate  · Depression  · Unusual sadness, discouragement and loneliness  · Talk of wanting to die  · Neglect of personal appearance   · Rebelliousness- reckless behavior  · Withdrawal from people/activities they love  · Confusion- inability to concentrate     If you or a loved one observes any of these behaviors or has concerns about self-harm, here's what you can do:  · Talk about it- your feelings and reasons for harming yourself  · Remove any means that you might use to hurt yourself (examples: pills, rope, extension cords, firearm)  · Get professional help from the community (Mental Health, Substance Abuse, psychological counseling)  · Do not be alone:Call your Safe Contact- someone whom you trust who will be there for you.  · Call your local CRISIS HOTLINE 950-6837 or 629-231-3607  · Call your local Children's Mobile Crisis Response Team [place] (518) 345-5199 or www.Bringg  · Call the toll free National Suicide Prevention Hotlines   · National Suicide Prevention Lifeline 888-120-GFHT (9360)  · National Hope Line Network 800-SUICIDE (891-6638)

## 2019-08-21 NOTE — DISCHARGE SUMMARY
Discharge Summary    CHIEF COMPLAINT ON ADMISSION  Chief Complaint   Patient presents with   • Seizure     not been taking her medication       Reason for Admission  Shaking     Admission Date  8/12/2019    CODE STATUS  Prior    HPI & HOSPITAL COURSE  This is a 38 y.o. female here with a near syncopal episode and a concern of her missing her appointments for dialysis. She has a history of end-stage renal failure due to lupus nephritis.  The patient apparently has missed some of her dialysis and thus came in that the patient had to be urgently dialyzed.  She improved with dialysis treatment. However, after dialysis she started having some chest discomfort.  An evaluation was done with echocardiogram. This showed possible endocarditis. She was started on empiric IV antibiotics. She subsequently underwent a GAYATHRI evaluation which did not show endocarditis but did show that her venous catheter was pushing through the tricuspid valve. All cultures were negative while here in the hospital and she remained afebrile. Her catheter has been in place for 4 years. Vascular surgery was consulted and her antibiotics were stopped. Dr Jansen is aquatinted with this patient and will see her for follow up in her office to discuss options for removal of her port. She has a right arm fistula for dialysis in place.        Therefore, she is discharged in good and stable condition to home with close outpatient follow-up.    The patient met 2-midnight criteria for an inpatient stay at the time of discharge.    Discharge Date  8/21/2019    FOLLOW UP ITEMS POST DISCHARGE  none    DISCHARGE DIAGNOSES  Principal Problem:    Acute respiratory failure with hypoxia (HCC) POA: Yes  Active Problems:    ESRD (end stage renal disease) on dialysis (HCC) POA: Yes    Avascular necrosis of bones of both hips ([facility]) POA: Yes    Endocarditis of tricuspid valve POA: Yes    Chronic lupus nephritis (HCC) POA: Yes    Seizures (HCC) POA: Unknown    Azotemia POA:  Unknown    Thrombocytopenia (CMS-HCC) POA: Yes    Hypertension POA: Unknown  Resolved Problems:    * No resolved hospital problems. *      FOLLOW UP  Future Appointments   Date Time Provider Department Center   8/22/2019 10:20 AM Quinn Chandler M.D. [facility] None   8/26/2019  9:00 AM Sushila Manzano M.D. [address]   10/8/2019 11:00 AM Sushila Manzano M.D. [address] Jansen M.D.  [address]  [place] [address]  [phone]      [place]  LEFT A MESSAGE WITH YOUR PHYSICIAN REGARDING NEED TO SCHEDULE FOLLOW UP APPOINTMENT. PLEASE REACH OUT TO YOUR PROVIDER IF YOU DO NOT HEAR FROM THEM WITH IN 2 DAYS. THANK YOU    Sushila Manzano M.D.  [address]  [address]  [place] [address]  [phone]            MEDICATIONS ON DISCHARGE     Medication List      CONTINUE taking these medications      Instructions   amLODIPine 10 MG Tabs  Commonly known as:  NORVASC   Take 10 mg by mouth 1 time daily as needed (If blood pressure is > 170).  Dose:  10 mg     ascorbic acid 500 MG Tabs  Commonly known as:  ascorbic acid   Take 500 mg by mouth every day.  Dose:  500 mg     docusate sodium 100 MG Caps  Commonly known as:  COLACE   Take 100 mg by mouth every day.  Dose:  100 mg     ferrous sulfate 325 (65 Fe) MG tablet   Take 325 mg by mouth every day.  Dose:  325 mg     hydroxychloroquine 200 MG Tabs  Commonly known as:  PLAQUENIL   Take 200 mg by mouth every bedtime.  Dose:  200 mg     lidocaine 5 % Ptch  Commonly known as:  LIDODERM   Apply 1 Patch to skin as directed as needed (For back pain). On for 12 hours off for 12 hours  Dose:  1 Patch     omeprazole 20 MG delayed-release capsule  Commonly known as:  PRILOSEC   Take 1 Cap by mouth every day.  Dose:  20 mg     Oxycodone HCl 20 MG Tabs   Take 20 mg by mouth 4 times a day.  Dose:  20 mg     pregabalin 75 MG Caps  Commonly known as:  LYRICA   Take 1 Cap by mouth 3 times a day for 30 days.  Dose:  75 mg     promethazine  25 MG Tabs  Commonly known as:  PHENERGAN   TAKE 1 TABLET BY MOUTH EVERY 8 HOURS AS NEEDED FOR NAUSEA OR VOMITING     RENVELA 800 MG Tabs tablet  Generic drug:  sevelamer carbonate   Take 1,600-3,200 mg by mouth See Admin Instructions. 1,600 mg with snacks   3,200 mg with meals  Dose:  1,600-3,200 mg     tizanidine 4 MG Tabs  Commonly known as:  ZANAFLEX   Take 8 mg by mouth every bedtime.  Dose:  8 mg     traZODone 50 MG Tabs  Commonly known as:  DESYREL   Take 1 Tab by mouth every bedtime.  Dose:  50 mg     VIMPAT 100 MG Tabs tablet  Generic drug:  lacosamide   Take 100 mg by mouth 2 Times a Day.  Dose:  100 mg     Vitamin D3 34022 UNIT Caps   Take 10,000 Units by mouth every day.  Dose:  10,000 Units            Allergies  Allergies   Allergen Reactions   • Lorazepam [Ativan] Anxiety     Patient becomes severely paranoid and agitated   • Morphine Itching     Tolerates Dilaudid   • Seasonal Runny Nose and Itching     Hay fever, sabiha brush       DIET  No orders of the defined types were placed in this encounter.      ACTIVITY  As tolerated.  Weight bearing as tolerated    CONSULTATIONS  RAMON Prescott M.D.      PROCEDURES  None      LABORATORY  Lab Results   Component Value Date    SODIUM 130 (L) 08/21/2019    POTASSIUM 4.6 08/21/2019    CHLORIDE 92 (L) 08/21/2019    CO2 24 08/21/2019    GLUCOSE 90 08/21/2019    BUN 50 (H) 08/21/2019    CREATININE 11.69 (HH) 08/21/2019    CREATININE 0.9 12/13/2008        Lab Results   Component Value Date    WBC 4.6 (L) 08/21/2019    HEMOGLOBIN 10.1 (L) 08/21/2019    HEMATOCRIT 31.6 (L) 08/21/2019    PLATELETCT 156 (L) 08/21/2019        Total time of the discharge process exceeds 34 minutes.

## 2019-08-22 ENCOUNTER — OFFICE VISIT (OUTPATIENT)
Dept: PHYSICAL MEDICINE AND REHAB | Facility: MEDICAL CENTER | Age: 39
End: 2019-08-22
Payer: MEDICARE

## 2019-08-22 ENCOUNTER — PATIENT OUTREACH (OUTPATIENT)
Dept: HEALTH INFORMATION MANAGEMENT | Facility: OTHER | Age: 39
End: 2019-08-22

## 2019-08-22 VITALS
BODY MASS INDEX: 25.44 KG/M2 | SYSTOLIC BLOOD PRESSURE: 122 MMHG | WEIGHT: 158.29 LBS | DIASTOLIC BLOOD PRESSURE: 72 MMHG | HEIGHT: 66 IN | TEMPERATURE: 99.1 F | HEART RATE: 78 BPM | OXYGEN SATURATION: 93 %

## 2019-08-22 DIAGNOSIS — G62.9 PERIPHERAL POLYNEUROPATHY: ICD-10-CM

## 2019-08-22 DIAGNOSIS — M87.052 AVASCULAR NECROSIS OF BONE OF HIP, LEFT (HCC): ICD-10-CM

## 2019-08-22 DIAGNOSIS — G89.29 CHRONIC BILATERAL LOW BACK PAIN WITHOUT SCIATICA: ICD-10-CM

## 2019-08-22 DIAGNOSIS — M54.50 CHRONIC BILATERAL LOW BACK PAIN WITHOUT SCIATICA: ICD-10-CM

## 2019-08-22 DIAGNOSIS — F11.90 CHRONIC, CONTINUOUS USE OF OPIOIDS: ICD-10-CM

## 2019-08-22 PROCEDURE — 99214 OFFICE O/P EST MOD 30 MIN: CPT | Performed by: PHYSICAL MEDICINE & REHABILITATION

## 2019-08-22 RX ORDER — OXYCODONE HYDROCHLORIDE 20 MG/1
20 TABLET ORAL 4 TIMES DAILY
Qty: 112 TAB | Refills: 0 | Status: SHIPPED | OUTPATIENT
Start: 2019-08-29 | End: 2019-09-19 | Stop reason: SDUPTHER

## 2019-08-22 ASSESSMENT — PAIN SCALES - GENERAL: PAINLEVEL: 7=MODERATE-SEVERE PAIN

## 2019-08-22 ASSESSMENT — PATIENT HEALTH QUESTIONNAIRE - PHQ9: CLINICAL INTERPRETATION OF PHQ2 SCORE: 0

## 2019-08-22 NOTE — PROGRESS NOTES
8/22/19 1:50pm: CM post discharge outreach call first attempt.  VM left requesting return call to  at [phone].    8/22/19 3:40pm:  CM post discharge outreach call second attempt.  VM left requesting return call to  at [phone].

## 2019-08-22 NOTE — PROGRESS NOTES
Follow up patient note  Pain Medicine, Interventional spine and sports physiatry, Physical medicine rehabilitation      Chief complaint:   Diffuse joint and body pain, low back, hips and legs      HISTORY      HPI  Patient identification/Interval histotry:     Debby Carrizales 38 y.o. female who has chronic pain related to rheumatologic disease, peripheral neuropathy and AVN hips, lupus.       Since the last visit, Debby has been in the hospital again from 08/12/2019 to 08/20/2019.  She was discharged on 08/20/2019 and she reports that she had had some trouble with getting dizzy and was hospitalized at [facility].  From what she reports, she has been found to have her venous catheter being too distal and pushing through the tricuspid valve.  Her port will need to be replaced.    Her left hip has been very severe and this is making it difficult for her.  She has not been able to see Dr. Holman since the last visit, but has been working on making arrangements to see him soon.  She has plans to pay a bill with her orthopedic surgeon, Dr. Holman prior to plans for left total hip replacement second to AVN. She is not using her walker, but she knows that she needs to do this.    She reports that she has been lidocaine patches on her low back, she has been trying to use these very sparingly due to expense.  They are helpful.    Lyrica helps with burning pain.  She has been off of this during the hospital stay, but is taking this again.  No side effects.  Burning pain in the feet was worse.  Since resuming this, pain is starting to return to baseline.  Aching pain in the upper back and low back and hips.  She has been cramping more from dialysis.  She had trouble with interruption of her dialysis when she fell and home and missed dialysis.  Also, had had pain in the groin that was significant and caused her to have to stop dialysis early prior to this admission.     She is on the [facility]  transplant list.    No side effects from oxycodone, mild constipation is managed with colace and a miralax equivalent.  She is having regular bowel movements on a daily basis.        ORT: 3  PHQ-9:0    Review of records:   Hospital discharge report noted from 08/12/2019-08/21/2019 admission.  She was admitted for a near syncopal episode.  Cardiac evaluation was suspicious for endocarditis and she was started on empiric IV abx.  GAYATHRI demonstrate no endocarditis, but did show that her venous catheter was pushing through the tricuspid valve.  Cultures were negative.  Dr. Jansen, vascular surgery, was consulted and they discussed follow-up plans for port management and this will be planned after discharge.    ROS   Unchanged from 05/16/2019 except as noted in HPI     Red Flags :   Chills, Sweats:No fevers, chills and night sweats.  She does report some low-grade fevers  Involuntary Weight Loss: Denies  Bowel/Bladder Incontinence: Denies  Saddle Anesthesia: Denies    PMHx:   Past Medical History:   Diagnosis Date   • Arthritis     all joints,r/t lupus   • Avascular necrosis of bones of both hips ([facility]) 10/10/2016   • Clostridium difficile colitis 5/3/2011   • Dialysis patient    • ESBL (extended spectrum beta-lactamase) producing bacteria infection 8/25/2014   • Fibromyalgia    • Hypertension    • Lupus ([facility])    • Pneumonia    • Psychiatric disorder     anxiety, depression   • Pyelonephritis 11/21/2017   • Renal failure    • Sepsis due to pneumonia ([facility]) 6/7/2018   • Status epilepticus ([facility]) 12/13/2018       PSHx:   Past Surgical History:   Procedure Laterality Date   • GAYATHRI N/A 8/20/2019    Procedure: ECHOCARDIOGRAM, TRANSESOPHAGEAL;  Surgeon: Marta Elaine M.D.;  Location: SURGERY [facility];  Service: Cardiac   • AV FISTULA REVISION Right 8/11/2018    Procedure: AV FISTULA REVISION- Graft and Thrombectomy;  Surgeon: Shabbir Ardon M.D.;  Location: SURGERY [facility];  Service: General   • AV  FISTULA THROMBOLYSIS Right 8/11/2018    Procedure: AV FISTULA THROMBOLYSIS;  Surgeon: Shabbir Ardon M.D.;  Location: SURGERY [facility];  Service: General   • AV FISTULA CREATION Right 12/9/2017    Procedure: AV FISTULA CREATION-ARM FOR GRAFT;  Surgeon: Lesly Jansen M.D.;  Location: [facility];  Service: General   • THROMBECTOMY  12/9/2017    Procedure: THROMBECTOMY;  Surgeon: Lesly Jansen M.D.;  Location: SURGERY [facility];  Service: General   • THROMBECTOMY Right 8/21/2016    Procedure: THROMBECTOMY - right AV fistula graft with grams;  Surgeon: Shabbir Ardon M.D.;  Location: SURGERY [facility];  Service:    • ANGIOPLASTY BALLOON  8/21/2016    Procedure: ANGIOPLASTY BALLOON;  Surgeon: Shabbir Ardon M.D.;  Location: [facility];  Service:    • THROMBECTOMY Right 8/20/2016    Procedure: THROMBECTOMY AV GRAFT;  Surgeon: Shabbir Ardon M.D.;  Location: [facility];  Service:    • AV FISTULA CREATION Right 7/12/2016    Procedure: AV FISTULA CREATION WITH GRAFT BRACHIAL AXILLARY;  Surgeon: Shabbir Ardon M.D.;  Location: [facility];  Service:    • CLOSED REDUCTION Right 7/5/2016    Procedure: CLOSED REDUCTION- Hip ;  Surgeon: Michael Holman M.D.;  Location: [facility];  Service:    • HIP ARTHROPLASTY TOTAL Right 1/18/2016    Procedure: HIP ARTHROPLASTY TOTAL;  Surgeon: Michael Holman M.D.;  Location: [facility];  Service:    • AV FISTULA CREATION  2/3/2015    Performed by Shabbir Ardon M.D. at [facility]   • AV FISTULA CREATION  11/14/2014    Performed by Shabbir Ardon M.D. at [facility]   • AV FISTULA CREATION  9/9/2014    Performed by Shabbir Ardon M.D. at [facility]   • CATH PLACEMENT  9/9/2014    Performed by Shabbir Ardon M.D. at [facility]   • OTHER  5/2011    tracheostomy   • GASTROSCOPY-ENDO  4/27/2011    Performed by FRANKI,  PALMIRA BURKS at ENDOSCOPY [facility]   • COLONOSCOPY WITH BIOPSY  4/20/2011    Performed by ALCIRA CRUZ at ENDOSCOPY [facility]   • GASTROSCOPY-ENDO  4/18/2011    Performed by ALCIRA CRUZ at ENDOSCOPY [facility]   • GASTROSCOPY-ENDO  4/10/2011    Performed by SYED JURADO at ENDOSCOPY [facility]   • SCLEROTHERAPHY  4/10/2011    Performed by SYED JURADO at ENDOSCOPY [facility]   • OTHER ABDOMINAL SURGERY      kidney biopsy   • TRACHEOSTOMY         Family history   Denies neuromuscular disease  Family History   Problem Relation Age of Onset   • Heart Disease Mother    • Cancer Father        Medications:   Outpatient Medications Marked as Taking for the 8/22/19 encounter (Office Visit) with Quinn Chandler M.D.   Medication Sig Dispense Refill   • [START ON 8/29/2019] Oxycodone HCl 20 MG Tab Take 1 Tab by mouth 4 times a day for 28 days. 112 Tab 0   • promethazine (PHENERGAN) 25 MG Tab TAKE 1 TABLET BY MOUTH EVERY 8 HOURS AS NEEDED FOR NAUSEA OR VOMITING 60 Tab 0   • Cholecalciferol (VITAMIN D3) 83515 UNIT Cap Take 10,000 Units by mouth every day.     • lacosamide (VIMPAT) 100 MG Tab tablet Take 100 mg by mouth 2 Times a Day.     • pregabalin (LYRICA) 75 MG Cap Take 1 Cap by mouth 3 times a day for 30 days. 90 Cap 1   • amLODIPine (NORVASC) 10 MG Tab Take 10 mg by mouth 1 time daily as needed (If blood pressure is > 170).     • lidocaine (LIDODERM) 5 % Patch Apply 1 Patch to skin as directed as needed (For back pain). On for 12 hours off for 12 hours      • docusate sodium (COLACE) 100 MG Cap Take 100 mg by mouth every day.     • tizanidine (ZANAFLEX) 4 MG Tab Take 8 mg by mouth every bedtime.     • traZODone (DESYREL) 50 MG Tab Take 1 Tab by mouth every bedtime. 90 Tab 3   • omeprazole (PRILOSEC) 20 MG delayed-release capsule Take 1 Cap by mouth every day. 30 Cap 3   • sevelamer carbonate (RENVELA) 800 MG Tab tablet Take 1,600-3,200 mg by mouth See Admin  Instructions. 1,600 mg with snacks   3,200 mg with meals     • ferrous sulfate 325 (65 FE) MG tablet Take 325 mg by mouth every day.     • ascorbic acid (ASCORBIC ACID) 500 MG Tab Take 500 mg by mouth every day.     • hydroxychloroquine (PLAQUENIL) 200 MG Tab Take 200 mg by mouth every bedtime.         Allergies:   Allergies   Allergen Reactions   • Lorazepam [Ativan] Anxiety     Patient becomes severely paranoid and agitated   • Morphine Itching     Tolerates Dilaudid   • Seasonal Runny Nose and Itching     Hay fever, sabiha brush       Social Hx:   Social History     Socioeconomic History   • Marital status: Single     Spouse name: Not on file   • Number of children: Not on file   • Years of education: Not on file   • Highest education level: Not on file   Occupational History   • Not on file   Social Needs   • Financial resource strain: Not on file   • Food insecurity:     Worry: Not on file     Inability: Not on file   • Transportation needs:     Medical: Not on file     Non-medical: Not on file   Tobacco Use   • Smoking status: Former Smoker     Packs/day: 0.50     Years: 18.00     Pack years: 9.00     Types: Cigarettes     Last attempt to quit: 2011     Years since quittin.2   • Smokeless tobacco: Never Used   • Tobacco comment:  ppd   Substance and Sexual Activity   • Alcohol use: No     Alcohol/week: 0.0 oz   • Drug use: No     Types: Marijuana   • Sexual activity: Not on file   Lifestyle   • Physical activity:     Days per week: Not on file     Minutes per session: Not on file   • Stress: Not on file   Relationships   • Social connections:     Talks on phone: Not on file     Gets together: Not on file     Attends Nondenominational service: Not on file     Active member of club or organization: Not on file     Attends meetings of clubs or organizations: Not on file     Relationship status: Not on file   • Intimate partner violence:     Fear of current or ex partner: Not on file     Emotionally abused: Not  "on file     Physically abused: Not on file     Forced sexual activity: Not on file   Other Topics Concern   •  Service No   • Blood Transfusions Yes   • Caffeine Concern No   • Occupational Exposure No   • Hobby Hazards No   • Sleep Concern Yes   • Stress Concern Yes   • Weight Concern Yes   • Special Diet Yes   • Back Care No   • Exercise Yes   • Bike Helmet No   • Seat Belt Yes   • Self-Exams Yes   Social History Narrative   • Not on file       EXAMINATION     Physical Exam:   Vitals: /72 (BP Location: Left arm, Patient Position: Sitting, BP Cuff Size: Adult)   Pulse 78   Temp 37.3 °C (99.1 °F) (Temporal)   Ht 1.664 m (5' 5.5\")   Wt 71.8 kg (158 lb 4.6 oz)   SpO2 93%     Constitutional:   Body Habitus: Body mass index is 25.94 kg/m².  Cooperation: Fully cooperates with exam  Appearance: Well-groomed no disheveled, in no acute distress  Respiratory-  breathing comfortable on room air, no wheezing.  Psychiatric- alert and oriented ×3. Normal affect.   Spine:   No focal motor deficits in the lower extremities bilaterally.  Sensation is grossly intact to light touch in the lower extremities bilaterally. She does not report significant paresthesias today.  Reflexes 2+ patella and 1+ achilles bilaterally  Gait slow, steady without loss of balance.  No use of assist device.       MEDICAL DECISION MAKING    DATA    Labs:     UDS:   06/13/2019 Negative for oxycodone, positive for other oxycodone metabolites  04/16/2019 Positive for oxymorphone  01/17/2019 Oxycodone and metabolites as expected  10/02/2018 Oxycodone and metabolites, also fentanyl    01/09/2019: Hep B surface ag negative  01/08/2019: GFR 7; BUN 23 Cr 6.68, Plt 160    12/15/2018 CRP 3.03    GFR 08/21/2019, 4  08/21/2019 WBC 4.6, Hb 10.1, Hct 31.6, Plt 156    BMP 12/16/2018  Na 136, Potassium 4.4, chloride 96, CO2 23 Glucose 83, BUN 55H, Cr 9.44H, Calcium 9.9, Anion gap 17.0H    CBC 3.3, Hb 9.6, Hct 31.3, Plt 115    Lab Results   Component " Value Date/Time    HBA1C 4.9 06/07/2018 02:29 AM        Imaging:   I reviewed the following radiology reports reviewed  GAYATHRI 08/20/2019  Echocardiography Laboratory  CONCLUSIONS  LV EF    65%.  Structurally normal tricuspid valve.  Trace tricuspid regurgitation.  Port catheter noted in the SVC and right atrium.  Tip support appears bright, no obvious vegetation.  The tip of the port abuts the tricuspid valve.  Recommend the port be pulled back approximately 2 cm.  No evidence of endocarditis.    Xray hip with pelvis-left 07/28/2019  Left femoral head heterogeneous density is compatible with known diagnosis of avascular necrosis. There is no joint space narrowing or spurring    Right arthroplasty without evident complication.    Xray left foot 01/21/2019  Nondisplaced transverse fracture through the base of the fifth metatarsal.    MRI lumbar spine 1/1/19  1.  Diffusely decreased signal again seen throughout the marrow space consistent with red marrow conversion, likely secondary to chronic anemia.  2.  No significant disc bulging, central canal narrowing, or foraminal narrowing.  3.  No lumbar spine fracture or subluxation.    MRI brain 12/13/2018  1.  There is no hippocampal sclerosis.  2.  There is no evidence of cortical dysplasia or neuronal migrational abnormalities.  3.  A few nonspecific T2 hyperintensities in the supratentorial white matter likely representing nonspecific foci of gliosis, chronic ischemia and demyelination. This is unchanged since the previous MRI dated 2/23/2012.       Chest xray 06/07/2018: Minimal right-sided opacities as described above, suggesting pneumonia.               Results for orders placed during the hospital encounter of 07/19/17   MR-LUMBAR SPINE-W/O    Impression 1.  Diffuse decreased bone marrow signal intensity throughout the lumbar spine and sacrum, presumably secondary to chronic anemia.    2.  Otherwise unremarkable MRI scan of the lumbar spine without contrast.            "                         DIAGNOSIS   Visit Diagnoses     ICD-10-CM   1. Avascular necrosis of bone of hip, left (HCC) M87.052   2. Peripheral polyneuropathy (HCC) G62.9   3. Chronic bilateral low back pain without sciatica M54.5    G89.29   4. Chronic, continuous use of opioids F11.90         ASSESSMENT and PLAN:     Debby Carrizales 38 y.o. Female returns for follow-up of her chronic pain related to lupus, AVN hips, low back and peripheral neuropathy    Debby was seen today for follow-up.    Diagnoses and all orders for this visit:    Avascular necrosis of bone of hip, left (HCC)  -     Oxycodone HCl 20 MG Tab; Take 1 Tab by mouth 4 times a day for 28 days.  -     Pain Management Screen  -     Consent for Opiate Prescription    Peripheral polyneuropathy (HCC)  -     Oxycodone HCl 20 MG Tab; Take 1 Tab by mouth 4 times a day for 28 days.  -     Pain Management Screen  -     Consent for Opiate Prescription    Chronic bilateral low back pain without sciatica  -     Oxycodone HCl 20 MG Tab; Take 1 Tab by mouth 4 times a day for 28 days.  -     Consent for Opiate Prescription    Chronic, continuous use of opioids  -     Pain Management Screen       1. Encouraged Debby to follow-up with Vascular surgery regarding making plans to manage venous catheter found to be pushing into the tricuspid valve.  2. Discussed planning to make follow-up with Dr. Holman regarding left hip avascular necrosis  3. Continue oxycodone up to four times a day.  Reviewed  and repeat UDS today.  4. Continue lyrica 75mg po tid.  Refill given previously.      In prescribing controlled substances to this patient, I certify that I have obtained and reviewed the medical history of Debby Carrizales. I have also made a good ly effort to obtain applicable records from other providers who have treated the patient and records demonstrating the following: report of \"drug-seeking behavior,\" although I suspect that she has organic reasons " for pain and will continue to monitor as appropriate.     I have conducted a physical exam and documented it. I have reviewed Ms. Carrizales’s prescription history as maintained by the [place] Prescription Monitoring Program.     I have assessed the patient’s risk for abuse, dependency, and addiction using the validated Opioid Risk Tool available at https://www.mdcalc.com/gsjkrb-isub-ilhb-ort-narcotic-abuse.     Given the above, I believe the benefits of controlled substance therapy outweigh the risks. The reasons for prescribing controlled substances include non-narcotic, oral analgesic alternatives have been inadequate for pain control and in my professional opinion, controlled substances are the only reasonable choice for this patient because she has limitations to medication choices due to being on dialysis.   Accordingly, I have discussed the risk and benefits, treatment plan, and alternative therapies with the patient.       Follow up: 5 weeks       Please note that this dictation was created using voice recognition software. I have made every reasonable attempt to correct obvious errors but there may be errors of grammar and content that I may have overlooked prior to finalization of this note.      Quinn Chandler MD  Interventional Spine and Sports Physiatry  Physical Medicine and Rehabilitation  [facility]

## 2019-08-26 ENCOUNTER — OFFICE VISIT (OUTPATIENT)
Dept: MEDICAL GROUP | Facility: MEDICAL CENTER | Age: 39
End: 2019-08-26
Payer: MEDICARE

## 2019-08-26 VITALS
BODY MASS INDEX: 30.3 KG/M2 | SYSTOLIC BLOOD PRESSURE: 152 MMHG | WEIGHT: 181.88 LBS | HEART RATE: 92 BPM | DIASTOLIC BLOOD PRESSURE: 80 MMHG | OXYGEN SATURATION: 97 % | HEIGHT: 65 IN | TEMPERATURE: 99.8 F

## 2019-08-26 DIAGNOSIS — Z86.19 HISTORY OF COLD SORES: ICD-10-CM

## 2019-08-26 DIAGNOSIS — R56.9 SEIZURES (HCC): ICD-10-CM

## 2019-08-26 DIAGNOSIS — M32.14: ICD-10-CM

## 2019-08-26 DIAGNOSIS — M79.7 FIBROMYALGIA: ICD-10-CM

## 2019-08-26 DIAGNOSIS — Z99.2 ESRD (END STAGE RENAL DISEASE) ON DIALYSIS (HCC): ICD-10-CM

## 2019-08-26 DIAGNOSIS — F33.1 MODERATE EPISODE OF RECURRENT MAJOR DEPRESSIVE DISORDER (HCC): ICD-10-CM

## 2019-08-26 DIAGNOSIS — N18.6 ESRD (END STAGE RENAL DISEASE) ON DIALYSIS (HCC): ICD-10-CM

## 2019-08-26 DIAGNOSIS — T82.9XXA VASCULAR PORT COMPLICATION, INITIAL ENCOUNTER: ICD-10-CM

## 2019-08-26 DIAGNOSIS — I15.1 HYPERTENSION SECONDARY TO OTHER RENAL DISORDERS: ICD-10-CM

## 2019-08-26 PROBLEM — R79.89 AZOTEMIA: Status: RESOLVED | Noted: 2019-08-12 | Resolved: 2019-08-26

## 2019-08-26 PROBLEM — I07.9 ENDOCARDITIS OF TRICUSPID VALVE: Status: RESOLVED | Noted: 2019-08-17 | Resolved: 2019-08-26

## 2019-08-26 PROBLEM — J90 PLEURAL EFFUSION ON LEFT: Status: RESOLVED | Noted: 2018-07-20 | Resolved: 2019-08-26

## 2019-08-26 PROCEDURE — 99495 TRANSJ CARE MGMT MOD F2F 14D: CPT | Performed by: FAMILY MEDICINE

## 2019-08-26 RX ORDER — VALACYCLOVIR HYDROCHLORIDE 500 MG/1
500 TABLET, FILM COATED ORAL 2 TIMES DAILY PRN
Qty: 60 TAB | Refills: 1 | Status: ON HOLD | OUTPATIENT
Start: 2019-08-26 | End: 2020-12-08

## 2019-08-26 RX ORDER — AMLODIPINE BESYLATE 10 MG/1
10 TABLET ORAL
Qty: 90 TAB | Refills: 1 | Status: SHIPPED | OUTPATIENT
Start: 2019-08-26 | End: 2020-02-14

## 2019-08-26 SDOH — HEALTH STABILITY: MENTAL HEALTH: HOW OFTEN DO YOU HAVE A DRINK CONTAINING ALCOHOL?: NEVER

## 2019-08-26 SDOH — HEALTH STABILITY: MENTAL HEALTH: HOW OFTEN DO YOU HAVE 6 OR MORE DRINKS ON ONE OCCASION?: NEVER

## 2019-08-26 NOTE — PROGRESS NOTES
cc:  Hospital follow up    Subjective:     Debby Carrizales is a 38 y.o. female presenting for a hospital follow up. Recently hospitalized for a near syncopal episode and missed dialysis from 8/12-8/21.  She reports that she was feeling lightheaded, off balance, falling at home prior to her hospitalization.  She reports that this had been happening for a couple years prior, but she attributed it to her dialysis, lupus.  During her hospitalization, she had chest discomfort and an echocardiogram showed possible endocarditis.  A GAYATHRI did not show endocarditis but her venous catheter/port tip was abutting the tricuspid valve.  She has not a follow-up appointment with vascular surgery yet, but plans to do so soon.  They are planning to replace the port port, she uses the port for lab draws, medications.  She has a right arm fistula for dialysis.  She has been going to dialysis regularly.  Still feels lightheaded at times.  Denies any chest pain, shortness of breath, seizure activity.  She continues to see pain management for her chronic hip pain, fibromyalgia.  She is still interested in counseling to help her cope with her multiple medical conditions.     Review of systems:  See above.       Current Outpatient Medications:   •  amLODIPine (NORVASC) 10 MG Tab, Take 1 Tab by mouth 1 time daily as needed (If blood pressure is > 170)., Disp: 90 Tab, Rfl: 1  •  valACYclovir (VALTREX) 500 MG Tab, Take 1 Tab by mouth 2 times a day as needed (cold sores)., Disp: 60 Tab, Rfl: 1  •  [START ON 8/29/2019] Oxycodone HCl 20 MG Tab, Take 1 Tab by mouth 4 times a day for 28 days., Disp: 112 Tab, Rfl: 0  •  promethazine (PHENERGAN) 25 MG Tab, TAKE 1 TABLET BY MOUTH EVERY 8 HOURS AS NEEDED FOR NAUSEA OR VOMITING, Disp: 60 Tab, Rfl: 0  •  Cholecalciferol (VITAMIN D3) 54079 UNIT Cap, Take 10,000 Units by mouth every day., Disp: , Rfl:   •  lacosamide (VIMPAT) 100 MG Tab tablet, Take 100 mg by mouth 2 Times a Day., Disp: , Rfl:   •   "pregabalin (LYRICA) 75 MG Cap, Take 1 Cap by mouth 3 times a day for 30 days., Disp: 90 Cap, Rfl: 1  •  lidocaine (LIDODERM) 5 % Patch, Apply 1 Patch to skin as directed as needed (For back pain). On for 12 hours off for 12 hours , Disp: , Rfl:   •  docusate sodium (COLACE) 100 MG Cap, Take 100 mg by mouth every day., Disp: , Rfl:   •  tizanidine (ZANAFLEX) 4 MG Tab, Take 8 mg by mouth every bedtime., Disp: , Rfl:   •  traZODone (DESYREL) 50 MG Tab, Take 1 Tab by mouth every bedtime., Disp: 90 Tab, Rfl: 3  •  omeprazole (PRILOSEC) 20 MG delayed-release capsule, Take 1 Cap by mouth every day., Disp: 30 Cap, Rfl: 3  •  sevelamer carbonate (RENVELA) 800 MG Tab tablet, Take 1,600-3,200 mg by mouth See Admin Instructions. 1,600 mg with snacks  3,200 mg with meals, Disp: , Rfl:   •  ferrous sulfate 325 (65 FE) MG tablet, Take 325 mg by mouth every day., Disp: , Rfl:   •  ascorbic acid (ASCORBIC ACID) 500 MG Tab, Take 500 mg by mouth every day., Disp: , Rfl:   •  hydroxychloroquine (PLAQUENIL) 200 MG Tab, Take 200 mg by mouth every bedtime., Disp: , Rfl:     Allergies, past medical history, past surgical history, family history, social history reviewed and updated    Objective:     Vitals: /80 (BP Location: Left arm, Patient Position: Sitting, BP Cuff Size: Adult)   Pulse 92   Temp 37.7 °C (99.8 °F) (Temporal)   Ht 1.651 m (5' 5\")   Wt 82.5 kg (181 lb 14.1 oz)   SpO2 97%   BMI 30.27 kg/m²   General: Alert, pleasant, NAD  HEENT: Normocephalic.  PERRL, EOMI, no icterus or pallor.  Conjunctivae and lids normal. External ears normal. Oropharynx non-erythematous, mucous membranes moist.  Neck supple.  No thyromegaly or masses palpated. No cervical or supraclavicular lymphadenopathy.  Heart: Regular rate and rhythm.  S1 and S2 normal.  No murmurs appreciated.  Respiratory: Normal respiratory effort.  Clear to auscultation bilaterally.  Abdomen: Non-distended, soft  Skin: Warm, dry, no rashes.  Musculoskeletal: Gait " is normal.   Extremities: No leg edema.  Psych:  Affect/mood is normal, judgement is good, memory is intact, grooming is appropriate.    Assessment/Plan:     Debby was seen today for hospital follow-up.    Diagnoses and all orders for this visit:    Vascular port complication, initial encounter  Stable, encourage patient to make an appointment with vascular surgery soon.    Hypertension secondary to other renal disorders  Stable, continue with amlodipine as needed  -     amLODIPine (NORVASC) 10 MG Tab; Take 1 Tab by mouth 1 time daily as needed (If blood pressure is > 170).    History of cold sores  Stable, continue valacyclovir as needed  -     valACYclovir (VALTREX) 500 MG Tab; Take 1 Tab by mouth 2 times a day as needed (cold sores).    ESRD (end stage renal disease) on dialysis (HCC)  Stable, managed by nephrology.  -     REFERRAL TO BEHAVIORAL HEALTH    Chronic lupus nephritis ([facility])  Stable, managed by rheumatology  -     REFERRAL TO BEHAVIORAL HEALTH    Fibromyalgia  Stable, managed by pain management  -     REFERRAL TO BEHAVIORAL HEALTH    Seizures ([facility])  Stable, managed by neurology  -     REFERRAL TO BEHAVIORAL HEALTH    Moderate episode of recurrent major depressive disorder ([facility])  Stable, discussed coping mechanisms, referral to behavioral health placed again.  -     REFERRAL TO BEHAVIORAL HEALTH        Return in about 2 months (around 10/26/2019) for routine follow up.      Face-to-face transitional care management services with moderate medical decision complexity were provided.     LACE+ Historical Score Over Time (0-28: Low, 29-58: Medium, 59+: High): 79    - Hospitalization and results reviewed with patient.   - Medications reviewed including instructions regarding high risk medications, dosing and side effects.  - Recommended Services: No services needed at this time  - Advance directive/POLST on file?  No     - Patient was discharged from [facility] on 8/21.  - Chart and discharge summary were  reviewed.   - 48- hour post discharge RN call completed on and documented in the medical record by Michelle Harp RN:    POST DISCHARGE CALL:  Discharge Date:8/21/2019  Date of Outreach Call: 8/22/2019 8/22/19 1:50pm: CM post discharge outreach call first attempt.   left requesting return call to  at [phone].     8/22/19 3:40pm:  CM post discharge outreach call second attempt.   left requesting return call to  at [phone].

## 2019-08-28 RX ORDER — PROMETHAZINE HYDROCHLORIDE 25 MG/1
TABLET ORAL
Qty: 60 TAB | Refills: 0 | Status: SHIPPED | OUTPATIENT
Start: 2019-08-28 | End: 2019-09-27 | Stop reason: SDUPTHER

## 2019-09-19 ENCOUNTER — OFFICE VISIT (OUTPATIENT)
Dept: PHYSICAL MEDICINE AND REHAB | Facility: MEDICAL CENTER | Age: 39
End: 2019-09-19
Payer: MEDICARE

## 2019-09-19 VITALS
BODY MASS INDEX: 30.05 KG/M2 | HEART RATE: 98 BPM | SYSTOLIC BLOOD PRESSURE: 138 MMHG | OXYGEN SATURATION: 94 % | TEMPERATURE: 98.8 F | HEIGHT: 65 IN | DIASTOLIC BLOOD PRESSURE: 80 MMHG | WEIGHT: 180.34 LBS

## 2019-09-19 DIAGNOSIS — G89.29 CHRONIC BILATERAL LOW BACK PAIN WITHOUT SCIATICA: ICD-10-CM

## 2019-09-19 DIAGNOSIS — M87.052 AVASCULAR NECROSIS OF BONE OF HIP, LEFT (HCC): ICD-10-CM

## 2019-09-19 DIAGNOSIS — Z99.2 ESRD (END STAGE RENAL DISEASE) ON DIALYSIS (HCC): ICD-10-CM

## 2019-09-19 DIAGNOSIS — F11.90 CHRONIC, CONTINUOUS USE OF OPIOIDS: ICD-10-CM

## 2019-09-19 DIAGNOSIS — G62.9 PERIPHERAL POLYNEUROPATHY: ICD-10-CM

## 2019-09-19 DIAGNOSIS — M54.50 CHRONIC BILATERAL LOW BACK PAIN WITHOUT SCIATICA: ICD-10-CM

## 2019-09-19 DIAGNOSIS — N18.6 ESRD (END STAGE RENAL DISEASE) ON DIALYSIS (HCC): ICD-10-CM

## 2019-09-19 DIAGNOSIS — M32.9 SYSTEMIC LUPUS ERYTHEMATOSUS, UNSPECIFIED SLE TYPE, UNSPECIFIED ORGAN INVOLVEMENT STATUS (HCC): ICD-10-CM

## 2019-09-19 PROCEDURE — 99214 OFFICE O/P EST MOD 30 MIN: CPT | Performed by: PHYSICAL MEDICINE & REHABILITATION

## 2019-09-19 RX ORDER — OXYCODONE HYDROCHLORIDE 20 MG/1
20 TABLET ORAL 4 TIMES DAILY
Qty: 112 TAB | Refills: 0 | Status: SHIPPED | OUTPATIENT
Start: 2019-09-26 | End: 2019-10-24 | Stop reason: SDUPTHER

## 2019-09-19 RX ORDER — PREGABALIN 75 MG/1
75 CAPSULE ORAL 3 TIMES DAILY
Qty: 90 CAP | Refills: 0 | Status: SHIPPED | OUTPATIENT
Start: 2019-09-19 | End: 2019-10-24 | Stop reason: SDUPTHER

## 2019-09-19 ASSESSMENT — PAIN SCALES - GENERAL: PAINLEVEL: 8=MODERATE-SEVERE PAIN

## 2019-09-19 ASSESSMENT — PATIENT HEALTH QUESTIONNAIRE - PHQ9: CLINICAL INTERPRETATION OF PHQ2 SCORE: 0

## 2019-09-19 NOTE — PROGRESS NOTES
Follow up patient note  Pain Medicine, Interventional spine and sports physiatry, Physical medicine rehabilitation      Chief complaint:   Diffuse joint and body pain, low back, hips and legs      HISTORY      HPI  Patient identification/Interval histotry:     Debby Carrizales 39 y.o. female who has chronic pain related to rheumatologic disease, peripheral neuropathy and AVN hips, lupus.       Since her last visit, she reports that she is going to need to have her venous catheter adjusted.  It has been pushed through the tricuspid valve.  This needs to be replaced, but no immediate plans as her previous surgeon has retired.    Her plans with Dr. Holman for left total hip replacement second to AVN are on hold pending port replacement.      Pain continues to be about the same.  Pain is rated at 7/10 on the VAS, primarily focused in her left hip.  Pain is worse with weight-bearing and walking.  Some posterior gluteal pain as well.  Aching in the upper back bilaterally is stable.    She has had fevers, low-grade, this improves with tylenol.  No other symptoms.    Lyrica helps with burning pain.  No side effects.  Burning pain in the feet was worse when she was taken off lyrica.     She is on the [facility] transplant list, but needs to reapply and meet with her .    No side effects from oxycodone, mild constipation is managed with colace and a miralax equivalent.  She is having regular bowel movements on a daily basis.        ORT: 3  PHQ-9:0    Review of records:   Hospital discharge report noted from 08/12/2019-08/21/2019 admission.  She was admitted for a near syncopal episode.  Cardiac evaluation was suspicious for endocarditis and she was started on empiric IV abx.  GAYATHRI demonstrate no endocarditis, but did show that her venous catheter was pushing through the tricuspid valve.  Cultures were negative.  Dr. Jansen, vascular surgery, was consulted and they discussed follow-up plans for port  management and this will be planned after discharge.    ROS   Unchanged from 08/22/2019 except as noted in HPI     Red Flags :   Chills, Sweats:No fevers, chills and night sweats.  She does report some low-grade fevers, unchanged  Involuntary Weight Loss: Denies  Bowel/Bladder Incontinence: Denies  Saddle Anesthesia: Denies    PMHx:   Past Medical History:   Diagnosis Date   • Arthritis     all joints,r/t lupus   • Avascular necrosis of bones of both hips ([facility]) 10/10/2016   • Clostridium difficile colitis 5/3/2011   • Dialysis patient    • ESBL (extended spectrum beta-lactamase) producing bacteria infection 8/25/2014   • Fibromyalgia    • Hypertension    • Lupus ([facility])    • Pneumonia    • Psychiatric disorder     anxiety, depression   • Pyelonephritis 11/21/2017   • Renal failure    • Sepsis due to pneumonia ([facility]) 6/7/2018   • Status epilepticus ([facility]) 12/13/2018       PSHx:   Past Surgical History:   Procedure Laterality Date   • GAYATHRI N/A 8/20/2019    Procedure: ECHOCARDIOGRAM, TRANSESOPHAGEAL;  Surgeon: Marta Elaine M.D.;  Location: [facility];  Service: Cardiac   • AV FISTULA REVISION Right 8/11/2018    Procedure: AV FISTULA REVISION- Graft and Thrombectomy;  Surgeon: Shabbir Ardon M.D.;  Location: [facility];  Service: General   • AV FISTULA THROMBOLYSIS Right 8/11/2018    Procedure: AV FISTULA THROMBOLYSIS;  Surgeon: Shabbir Ardon M.D.;  Location: [facility];  Service: General   • AV FISTULA CREATION Right 12/9/2017    Procedure: AV FISTULA CREATION-ARM FOR GRAFT;  Surgeon: Lesly Jansen M.D.;  Location: [facility];  Service: General   • THROMBECTOMY  12/9/2017    Procedure: THROMBECTOMY;  Surgeon: Lesly Jansen M.D.;  Location: [facility];  Service: General   • THROMBECTOMY Right 8/21/2016    Procedure: THROMBECTOMY - right AV fistula graft with grams;  Surgeon: Shabbir Ardon M.D.;  Location: [facility]  ORS;  Service:    • ANGIOPLASTY BALLOON  8/21/2016    Procedure: ANGIOPLASTY BALLOON;  Surgeon: Shabbir Ardon M.D.;  Location: SURGERY [facility];  Service:    • THROMBECTOMY Right 8/20/2016    Procedure: THROMBECTOMY AV GRAFT;  Surgeon: Shabbir Ardon M.D.;  Location: SURGERY [facility];  Service:    • AV FISTULA CREATION Right 7/12/2016    Procedure: AV FISTULA CREATION WITH GRAFT BRACHIAL AXILLARY;  Surgeon: Shabbir Ardon M.D.;  Location: SURGERY [facility];  Service:    • CLOSED REDUCTION Right 7/5/2016    Procedure: CLOSED REDUCTION- Hip ;  Surgeon: Michael Holman M.D.;  Location: SURGERY [facility];  Service:    • HIP ARTHROPLASTY TOTAL Right 1/18/2016    Procedure: HIP ARTHROPLASTY TOTAL;  Surgeon: Michael Holman M.D.;  Location: [facility];  Service:    • AV FISTULA CREATION  2/3/2015    Performed by Shabbir Ardon M.D. at SURGERY [facility]   • AV FISTULA CREATION  11/14/2014    Performed by Shabbir Ardon M.D. at [facility]   • AV FISTULA CREATION  9/9/2014    Performed by Shabbir Ardon M.D. at SURGERY [facility]   • CATH PLACEMENT  9/9/2014    Performed by Shabbir Ardon M.D. at [facility]   • OTHER  5/2011    tracheostomy   • GASTROSCOPY-ENDO  4/27/2011    Performed by PALMIRA DOAN at ENDOSCOPY [facility]   • COLONOSCOPY WITH BIOPSY  4/20/2011    Performed by ALCIRA CRUZ at [facility]   • GASTROSCOPY-ENDO  4/18/2011    Performed by ALCIRA CRUZ at ENDOSCOPY [facility]   • GASTROSCOPY-ENDO  4/10/2011    Performed by SYED JURADO at [facility]   • SCLEROTHERAPHY  4/10/2011    Performed by SYED JURADO at [facility]   • OTHER ABDOMINAL SURGERY      kidney biopsy   • TRACHEOSTOMY         Family history   Denies neuromuscular disease  Family History   Problem Relation Age of Onset   • Heart Disease Mother    • Cancer Father         Medications:   Outpatient Medications Marked as Taking for the 9/19/19 encounter (Office Visit) with Quinn Chandler M.D.   Medication Sig Dispense Refill   • [START ON 9/26/2019] Oxycodone HCl 20 MG Tab Take 1 Tab by mouth 4 times a day for 28 days. 112 Tab 0   • pregabalin (LYRICA) 75 MG Cap Take 1 Cap by mouth 3 times a day for 30 days. 90 Cap 0   • promethazine (PHENERGAN) 25 MG Tab TAKE 1 TABLET BY MOUTH EVERY 8 HOURS AS NEEDED FOR NAUSEA OR VOMITING 60 Tab 0   • amLODIPine (NORVASC) 10 MG Tab Take 1 Tab by mouth 1 time daily as needed (If blood pressure is > 170). 90 Tab 1   • valACYclovir (VALTREX) 500 MG Tab Take 1 Tab by mouth 2 times a day as needed (cold sores). 60 Tab 1   • Cholecalciferol (VITAMIN D3) 88051 UNIT Cap Take 10,000 Units by mouth every day.     • lacosamide (VIMPAT) 100 MG Tab tablet Take 100 mg by mouth 2 Times a Day.     • lidocaine (LIDODERM) 5 % Patch Apply 1 Patch to skin as directed as needed (For back pain). On for 12 hours off for 12 hours      • docusate sodium (COLACE) 100 MG Cap Take 100 mg by mouth every day.     • tizanidine (ZANAFLEX) 4 MG Tab Take 8 mg by mouth every bedtime.     • traZODone (DESYREL) 50 MG Tab Take 1 Tab by mouth every bedtime. 90 Tab 3   • omeprazole (PRILOSEC) 20 MG delayed-release capsule Take 1 Cap by mouth every day. 30 Cap 3   • sevelamer carbonate (RENVELA) 800 MG Tab tablet Take 1,600-3,200 mg by mouth See Admin Instructions. 1,600 mg with snacks   3,200 mg with meals     • ferrous sulfate 325 (65 FE) MG tablet Take 325 mg by mouth every day.     • ascorbic acid (ASCORBIC ACID) 500 MG Tab Take 500 mg by mouth every day.     • hydroxychloroquine (PLAQUENIL) 200 MG Tab Take 200 mg by mouth every bedtime.         Allergies:   Allergies   Allergen Reactions   • Lorazepam [Ativan] Anxiety     Patient becomes severely paranoid and agitated   • Morphine Itching     Tolerates Dilaudid   • Seasonal Runny Nose and Itching     Hay fever, sabiha brush        Social Hx:   Social History     Socioeconomic History   • Marital status: Single     Spouse name: Not on file   • Number of children: Not on file   • Years of education: Not on file   • Highest education level: Not on file   Occupational History   • Not on file   Social Needs   • Financial resource strain: Not on file   • Food insecurity:     Worry: Not on file     Inability: Not on file   • Transportation needs:     Medical: Not on file     Non-medical: Not on file   Tobacco Use   • Smoking status: Former Smoker     Packs/day: 0.50     Years: 18.00     Pack years: 9.00     Types: Cigarettes     Start date:      Last attempt to quit: 2011     Years since quittin.2   • Smokeless tobacco: Never Used   • Tobacco comment: started at 15   Substance and Sexual Activity   • Alcohol use: No     Alcohol/week: 0.0 oz     Frequency: Never     Binge frequency: Never   • Drug use: No     Types: Marijuana   • Sexual activity: Not Currently     Partners: Male   Lifestyle   • Physical activity:     Days per week: Not on file     Minutes per session: Not on file   • Stress: Not on file   Relationships   • Social connections:     Talks on phone: Not on file     Gets together: Not on file     Attends Restorationist service: Not on file     Active member of club or organization: Not on file     Attends meetings of clubs or organizations: Not on file     Relationship status: Not on file   • Intimate partner violence:     Fear of current or ex partner: Not on file     Emotionally abused: Not on file     Physically abused: Not on file     Forced sexual activity: Not on file   Other Topics Concern   •  Service No   • Blood Transfusions Yes   • Caffeine Concern No   • Occupational Exposure No   • Hobby Hazards No   • Sleep Concern Yes   • Stress Concern Yes   • Weight Concern Yes   • Special Diet Yes   • Back Care No   • Exercise Yes   • Bike Helmet No   • Seat Belt Yes   • Self-Exams Yes   Social History Narrative   •  "Not on file       EXAMINATION     Physical Exam:   Vitals: /80 (BP Location: Left arm, Patient Position: Sitting, BP Cuff Size: Large adult long)   Pulse 98   Temp 37.1 °C (98.8 °F) (Temporal)   Ht 1.651 m (5' 5\")   Wt 81.8 kg (180 lb 5.4 oz)   SpO2 94%     Constitutional:   Body Habitus: Body mass index is 30.01 kg/m².  Cooperation: Fully cooperates with exam  Appearance: Well-groomed no disheveled, in no acute distress  Respiratory-  breathing comfortable on room air, no wheezing.  Psychiatric- alert and oriented ×3. Normal affect.   Spine:   No focal motor deficits in the lower extremities bilaterally.  Sensation is grossly intact to light touch in the lower extremities bilaterally.   Reflexes 2+ patella and 1+ achilles bilaterally  Gait slow, steady without loss of balance.  No use of assist device.       MEDICAL DECISION MAKING    DATA    Labs:     UDS:   08/22/2019 Oxycodone and metabolites (patient does not have a script for phenergan with codeine and this was negative) Therefore, consistent with medications  06/13/2019 Negative for oxycodone, positive for other oxycodone metabolites  04/16/2019 Positive for oxymorphone  01/17/2019 Oxycodone and metabolites as expected  10/02/2018 Oxycodone and metabolites, also fentanyl    01/09/2019: Hep B surface ag negative  01/08/2019: GFR 7; BUN 23 Cr 6.68, Plt 160    12/15/2018 CRP 3.03    GFR 08/21/2019, 4  08/21/2019 WBC 4.6, Hb 10.1, Hct 31.6, Plt 156    BMP 12/16/2018  Na 136, Potassium 4.4, chloride 96, CO2 23 Glucose 83, BUN 55H, Cr 9.44H, Calcium 9.9, Anion gap 17.0H    CBC 3.3, Hb 9.6, Hct 31.3, Plt 115    Lab Results   Component Value Date/Time    HBA1C 4.9 06/07/2018 02:29 AM        Imaging:   I reviewed the following radiology reports reviewed  GAYATHRI 08/20/2019  Echocardiography Laboratory  CONCLUSIONS  LV EF    65%.  Structurally normal tricuspid valve.  Trace tricuspid regurgitation.  Port catheter noted in the SVC and right atrium.  Tip support " appears bright, no obvious vegetation.  The tip of the port abuts the tricuspid valve.  Recommend the port be pulled back approximately 2 cm.  No evidence of endocarditis.    Xray hip with pelvis-left 07/28/2019  Left femoral head heterogeneous density is compatible with known diagnosis of avascular necrosis. There is no joint space narrowing or spurring    Right arthroplasty without evident complication.    Xray left foot 01/21/2019  Nondisplaced transverse fracture through the base of the fifth metatarsal.    MRI lumbar spine 1/1/19  1.  Diffusely decreased signal again seen throughout the marrow space consistent with red marrow conversion, likely secondary to chronic anemia.  2.  No significant disc bulging, central canal narrowing, or foraminal narrowing.  3.  No lumbar spine fracture or subluxation.    MRI brain 12/13/2018  1.  There is no hippocampal sclerosis.  2.  There is no evidence of cortical dysplasia or neuronal migrational abnormalities.  3.  A few nonspecific T2 hyperintensities in the supratentorial white matter likely representing nonspecific foci of gliosis, chronic ischemia and demyelination. This is unchanged since the previous MRI dated 2/23/2012.       Chest xray 06/07/2018: Minimal right-sided opacities as described above, suggesting pneumonia.               Results for orders placed during the hospital encounter of 07/19/17   MR-LUMBAR SPINE-W/O    Impression 1.  Diffuse decreased bone marrow signal intensity throughout the lumbar spine and sacrum, presumably secondary to chronic anemia.    2.  Otherwise unremarkable MRI scan of the lumbar spine without contrast.                                    DIAGNOSIS   Visit Diagnoses     ICD-10-CM   1. Avascular necrosis of bone of hip, left ([facility]) M87.052   2. Peripheral polyneuropathy ([facility]) G62.9   3. Chronic bilateral low back pain without sciatica M54.5    G89.29   4. Chronic, continuous use of opioids F11.90   5. ESRD (end stage renal disease) on  dialysis ([facility]) N18.6    Z99.2   6. Systemic lupus erythematosus, unspecified SLE type, unspecified organ involvement status ([facility]) M32.9         ASSESSMENT and PLAN:     Debby Carrizales 38 y.o. Female returns for follow-up of her chronic pain related to lupus, AVN hips, low back and peripheral neuropathy    Debby was seen today for follow-up.    Diagnoses and all orders for this visit:    Avascular necrosis of bone of hip, left ([facility])  -     Oxycodone HCl 20 MG Tab; Take 1 Tab by mouth 4 times a day for 28 days.  -     Consent for Opiate Prescription  -     Controlled Substance Treatment Agreement    Peripheral polyneuropathy ([facility])  -     Oxycodone HCl 20 MG Tab; Take 1 Tab by mouth 4 times a day for 28 days.  -     pregabalin (LYRICA) 75 MG Cap; Take 1 Cap by mouth 3 times a day for 30 days.  -     Consent for Opiate Prescription  -     Controlled Substance Treatment Agreement    Chronic bilateral low back pain without sciatica  -     Oxycodone HCl 20 MG Tab; Take 1 Tab by mouth 4 times a day for 28 days.  -     Consent for Opiate Prescription  -     Controlled Substance Treatment Agreement    Chronic, continuous use of opioids    ESRD (end stage renal disease) on dialysis ([facility])    Systemic lupus erythematosus, unspecified SLE type, unspecified organ involvement status ([facility])       1. Encouraged Debby to follow-up with Vascular surgery regarding making plans to manage venous catheter found to be pushing into the tricuspid valve.  She is working on this.  2. Discussed planning to make follow-up with Dr. Holman regarding left hip avascular necrosis.  This will not occur until after venous catheter adjustment.  3. Continue oxycodone up to four times a day.  Reviewed  and repeat UDS reviewed.  Phenergan with codeine was somehow reported as a medication, but she does not have this and it was negative.  Results as expected.  4. Continue lyrica 75mg po tid.  Refilled today.     In prescribing controlled substances  "to this patient, I certify that I have obtained and reviewed the medical history of Debby Carrizales. I have also made a good ly effort to obtain applicable records from other providers who have treated the patient and records demonstrating the following: report of \"drug-seeking behavior,\" although I suspect that she has organic reasons for pain and will continue to monitor as appropriate.     I have conducted a physical exam and documented it. I have reviewed Ms. Carrizales’s prescription history as maintained by the [place] Prescription Monitoring Program.     I have assessed the patient’s risk for abuse, dependency, and addiction using the validated Opioid Risk Tool available at https://www.mdcalc.com/jerfqx-lwep-vhea-ort-narcotic-abuse.     Given the above, I believe the benefits of controlled substance therapy outweigh the risks. The reasons for prescribing controlled substances include non-narcotic, oral analgesic alternatives have been inadequate for pain control and in my professional opinion, controlled substances are the only reasonable choice for this patient because she has limitations to medication choices due to being on dialysis.   Accordingly, I have discussed the risk and benefits, treatment plan, and alternative therapies with the patient.       Follow up: 4 weeks       Please note that this dictation was created using voice recognition software. I have made every reasonable attempt to correct obvious errors but there may be errors of grammar and content that I may have overlooked prior to finalization of this note.      Quinn Chandler MD  Interventional Spine and Sports Physiatry  Physical Medicine and Rehabilitation  [facility]  "

## 2019-09-23 NOTE — FLOWSHEET NOTE
08/12/19 0965   Events/Summary/Plan   Events/Summary/Plan Placed on BiPAP   General Vent Information   Pulse Oximetry 98 %   BiPAP for Respiratory Failure   #BiPap Initial Day  Yes   IPAP (cmH2O) 17   EPAP (cm H2O) 7   FiO2 100   Alarms Set / Checked Yes   Non-Invasive Temp (C) 31   Respiratory Rate Patient 47   Spontaneous Vt (ml) 854   Spontaneous Ve (LPM) 22   Chest Exam   Work Of Breathing / Effort Moderate;Increased Work of Breathing;Accessory Muscle Use   Breath Sounds   RUL Breath Sounds Rhonchi   RML Breath Sounds Rhonchi   RLL Breath Sounds Rhonchi   LASHANDA Breath Sounds Rhonchi   LLL Breath Sounds Rhonchi      Patient Education        Quadricep Muscle Strain: Rehab Exercises  Introduction  Here are some examples of exercises for you to try. The exercises may be suggested for a condition or for rehabilitation. Start each exercise slowly. Ease off the exercises if you start to have pain. You will be told when to start these exercises and which ones will work best for you. How to do the exercises  Standing quadriceps stretch    1. If you are not steady on your feet, hold on to a chair, counter, or wall. 2. Bend the knee of the leg you want to stretch, and reach behind you to grab the front of your foot or ankle with the hand on the same side. For example, if you are stretching your right leg, use your right hand. 3. Keeping your knees next to each other, pull your foot toward your buttock until you feel a gentle stretch across the front of your hip and down the front of your thigh. Your knee should be pointed directly to the ground, and not out to the side. 4. Hold the stretch for at least 15 to 30 seconds. 5. Repeat 2 to 4 times. Quadricep and hip flexor stretch (lying on side)    1. Lie on your side with your good leg flat on the floor and your hand supporting your head. 2. Bend your top leg, and reach behind you to grab the front of that foot or ankle with your other hand. 3. Stretch your leg back by pulling your foot toward your buttock. You will feel the stretch in the front of your thigh. If this causes stress on your knee, do not do this stretch. 4. Hold the stretch for at least 15 to 30 seconds. 5. Repeat 2 to 4 times. Hamstring stretch (lying down)    1. Lie flat on your back with your legs straight. If you feel discomfort in your back, place a small towel roll under your lower back. 2. Holding the back of your affected leg for support, lift your leg straight up and toward your body until you feel a stretch at the back of your thigh. 3. Hold the stretch for at least 30 seconds.   4. Repeat 2 to 4 times.    Follow-up care is a key part of your treatment and safety. Be sure to make and go to all appointments, and call your doctor if you are having problems. It's also a good idea to know your test results and keep a list of the medicines you take. Where can you learn more? Go to [email or web address] and sign in to your [employer] account. Enter E898 in the Fluid box to learn more about \"Quadricep Muscle Strain: Rehab Exercises. \"     If you do not have an account, please click on the \"Sign Up Now\" link. Current as of: September 20, 2018  Content Version: 12.1  © 8358-0891 [employer], [employer]. Care instructions adapted under license by [facility] ([facility]). If you have questions about a medical condition or this instruction, always ask your healthcare professional. [address] any warranty or liability for your use of this information.

## 2019-09-26 RX ORDER — PROMETHAZINE HYDROCHLORIDE 25 MG/1
TABLET ORAL
Qty: 60 TAB | Refills: 1 | Status: SHIPPED | OUTPATIENT
Start: 2019-09-26 | End: 2020-03-11

## 2019-09-27 RX ORDER — PROMETHAZINE HYDROCHLORIDE 25 MG/1
TABLET ORAL
Qty: 60 TAB | Refills: 3 | Status: SHIPPED | OUTPATIENT
Start: 2019-09-27 | End: 2020-01-16

## 2019-10-02 ENCOUNTER — HOSPITAL ENCOUNTER (INPATIENT)
Facility: MEDICAL CENTER | Age: 39
LOS: 2 days | DRG: 682 | End: 2019-10-05
Attending: EMERGENCY MEDICINE | Admitting: HOSPITALIST
Payer: MEDICARE

## 2019-10-02 DIAGNOSIS — R41.0 CONFUSED: ICD-10-CM

## 2019-10-02 DIAGNOSIS — N18.9 CHRONIC RENAL FAILURE, UNSPECIFIED CKD STAGE: ICD-10-CM

## 2019-10-02 DIAGNOSIS — R25.1 TREMORS OF NERVOUS SYSTEM: ICD-10-CM

## 2019-10-02 DIAGNOSIS — R56.9 SEIZURES (HCC): ICD-10-CM

## 2019-10-02 DIAGNOSIS — E87.20 METABOLIC ACIDOSIS: ICD-10-CM

## 2019-10-02 PROBLEM — G40.909 SEIZURE DISORDER (HCC): Status: ACTIVE | Noted: 2019-10-02

## 2019-10-02 PROBLEM — G93.41 ACUTE METABOLIC ENCEPHALOPATHY: Status: ACTIVE | Noted: 2019-10-02

## 2019-10-02 LAB
ALBUMIN SERPL BCP-MCNC: 4.1 G/DL (ref 3.2–4.9)
ALBUMIN/GLOB SERPL: 1.1 G/DL
ALP SERPL-CCNC: 91 U/L (ref 30–99)
ALT SERPL-CCNC: 9 U/L (ref 2–50)
ANION GAP SERPL CALC-SCNC: 27 MMOL/L (ref 0–11.9)
AST SERPL-CCNC: 11 U/L (ref 12–45)
BASOPHILS # BLD AUTO: 1 % (ref 0–1.8)
BASOPHILS # BLD: 0.05 K/UL (ref 0–0.12)
BILIRUB SERPL-MCNC: 0.4 MG/DL (ref 0.1–1.5)
BUN SERPL-MCNC: 78 MG/DL (ref 8–22)
CALCIUM SERPL-MCNC: 9.1 MG/DL (ref 8.4–10.2)
CHLORIDE SERPL-SCNC: 89 MMOL/L (ref 96–112)
CO2 SERPL-SCNC: 17 MMOL/L (ref 20–33)
CREAT SERPL-MCNC: 15.2 MG/DL (ref 0.5–1.4)
EOSINOPHIL # BLD AUTO: 0.23 K/UL (ref 0–0.51)
EOSINOPHIL NFR BLD: 4.7 % (ref 0–6.9)
ERYTHROCYTE [DISTWIDTH] IN BLOOD BY AUTOMATED COUNT: 42 FL (ref 35.9–50)
GLOBULIN SER CALC-MCNC: 3.7 G/DL (ref 1.9–3.5)
GLUCOSE SERPL-MCNC: 94 MG/DL (ref 65–99)
HCT VFR BLD AUTO: 25 % (ref 37–47)
HGB BLD-MCNC: 8.2 G/DL (ref 12–16)
IMM GRANULOCYTES # BLD AUTO: 0.02 K/UL (ref 0–0.11)
IMM GRANULOCYTES NFR BLD AUTO: 0.4 % (ref 0–0.9)
LYMPHOCYTES # BLD AUTO: 0.91 K/UL (ref 1–4.8)
LYMPHOCYTES NFR BLD: 18.5 % (ref 22–41)
MCH RBC QN AUTO: 31.7 PG (ref 27–33)
MCHC RBC AUTO-ENTMCNC: 32.8 G/DL (ref 33.6–35)
MCV RBC AUTO: 96.5 FL (ref 81.4–97.8)
MONOCYTES # BLD AUTO: 0.7 K/UL (ref 0–0.85)
MONOCYTES NFR BLD AUTO: 14.2 % (ref 0–13.4)
NEUTROPHILS # BLD AUTO: 3.01 K/UL (ref 2–7.15)
NEUTROPHILS NFR BLD: 61.2 % (ref 44–72)
NRBC # BLD AUTO: 0 K/UL
NRBC BLD-RTO: 0 /100 WBC
PLATELET # BLD AUTO: 138 K/UL (ref 164–446)
PMV BLD AUTO: 9.8 FL (ref 9–12.9)
POTASSIUM SERPL-SCNC: 5.1 MMOL/L (ref 3.6–5.5)
PROT SERPL-MCNC: 7.8 G/DL (ref 6–8.2)
RBC # BLD AUTO: 2.59 M/UL (ref 4.2–5.4)
SODIUM SERPL-SCNC: 133 MMOL/L (ref 135–145)
WBC # BLD AUTO: 4.9 K/UL (ref 4.8–10.8)

## 2019-10-02 PROCEDURE — 99214 OFFICE O/P EST MOD 30 MIN: CPT | Performed by: INTERNAL MEDICINE

## 2019-10-02 PROCEDURE — 90935 HEMODIALYSIS ONE EVALUATION: CPT

## 2019-10-02 PROCEDURE — 80074 ACUTE HEPATITIS PANEL: CPT

## 2019-10-02 PROCEDURE — 80053 COMPREHEN METABOLIC PANEL: CPT

## 2019-10-02 PROCEDURE — 96374 THER/PROPH/DIAG INJ IV PUSH: CPT

## 2019-10-02 PROCEDURE — 700102 HCHG RX REV CODE 250 W/ 637 OVERRIDE(OP): Performed by: HOSPITALIST

## 2019-10-02 PROCEDURE — 5A1D70Z PERFORMANCE OF URINARY FILTRATION, INTERMITTENT, LESS THAN 6 HOURS PER DAY: ICD-10-PCS | Performed by: INTERNAL MEDICINE

## 2019-10-02 PROCEDURE — 96376 TX/PRO/DX INJ SAME DRUG ADON: CPT

## 2019-10-02 PROCEDURE — A9270 NON-COVERED ITEM OR SERVICE: HCPCS | Performed by: HOSPITALIST

## 2019-10-02 PROCEDURE — 85025 COMPLETE CBC W/AUTO DIFF WBC: CPT

## 2019-10-02 PROCEDURE — 700111 HCHG RX REV CODE 636 W/ 250 OVERRIDE (IP): Performed by: EMERGENCY MEDICINE

## 2019-10-02 PROCEDURE — G0378 HOSPITAL OBSERVATION PER HR: HCPCS

## 2019-10-02 PROCEDURE — 86706 HEP B SURFACE ANTIBODY: CPT

## 2019-10-02 PROCEDURE — 94760 N-INVAS EAR/PLS OXIMETRY 1: CPT

## 2019-10-02 PROCEDURE — 99285 EMERGENCY DEPT VISIT HI MDM: CPT

## 2019-10-02 PROCEDURE — 99220 PR INITIAL OBSERVATION CARE,LEVL III: CPT | Performed by: HOSPITALIST

## 2019-10-02 RX ORDER — AMOXICILLIN 250 MG
2 CAPSULE ORAL 2 TIMES DAILY
Status: DISCONTINUED | OUTPATIENT
Start: 2019-10-02 | End: 2019-10-05 | Stop reason: HOSPADM

## 2019-10-02 RX ORDER — FERROUS SULFATE 325(65) MG
325 TABLET ORAL DAILY
Status: DISCONTINUED | OUTPATIENT
Start: 2019-10-03 | End: 2019-10-05 | Stop reason: HOSPADM

## 2019-10-02 RX ORDER — ACETAMINOPHEN 325 MG/1
650 TABLET ORAL EVERY 6 HOURS PRN
Status: DISCONTINUED | OUTPATIENT
Start: 2019-10-02 | End: 2019-10-05 | Stop reason: HOSPADM

## 2019-10-02 RX ORDER — POLYETHYLENE GLYCOL 3350 17 G/17G
1 POWDER, FOR SOLUTION ORAL
Status: DISCONTINUED | OUTPATIENT
Start: 2019-10-02 | End: 2019-10-05 | Stop reason: HOSPADM

## 2019-10-02 RX ORDER — HYDROXYCHLOROQUINE SULFATE 200 MG/1
200 TABLET, FILM COATED ORAL
Status: DISCONTINUED | OUTPATIENT
Start: 2019-10-02 | End: 2019-10-05 | Stop reason: HOSPADM

## 2019-10-02 RX ORDER — PROMETHAZINE HYDROCHLORIDE 25 MG/1
25 TABLET ORAL EVERY 4 HOURS PRN
Status: DISCONTINUED | OUTPATIENT
Start: 2019-10-02 | End: 2019-10-05 | Stop reason: HOSPADM

## 2019-10-02 RX ORDER — PROMETHAZINE HYDROCHLORIDE 25 MG/1
12.5-25 SUPPOSITORY RECTAL EVERY 4 HOURS PRN
Status: DISCONTINUED | OUTPATIENT
Start: 2019-10-02 | End: 2019-10-05 | Stop reason: HOSPADM

## 2019-10-02 RX ORDER — LACOSAMIDE 50 MG/1
100 TABLET ORAL 2 TIMES DAILY
Status: DISCONTINUED | OUTPATIENT
Start: 2019-10-02 | End: 2019-10-05 | Stop reason: HOSPADM

## 2019-10-02 RX ORDER — PROCHLORPERAZINE EDISYLATE 5 MG/ML
5-10 INJECTION INTRAMUSCULAR; INTRAVENOUS EVERY 4 HOURS PRN
Status: DISCONTINUED | OUTPATIENT
Start: 2019-10-02 | End: 2019-10-05 | Stop reason: HOSPADM

## 2019-10-02 RX ORDER — MIDAZOLAM HYDROCHLORIDE 1 MG/ML
0.5 INJECTION INTRAMUSCULAR; INTRAVENOUS ONCE
Status: COMPLETED | OUTPATIENT
Start: 2019-10-02 | End: 2019-10-02

## 2019-10-02 RX ORDER — ONDANSETRON 2 MG/ML
4 INJECTION INTRAMUSCULAR; INTRAVENOUS EVERY 4 HOURS PRN
Status: DISCONTINUED | OUTPATIENT
Start: 2019-10-02 | End: 2019-10-05 | Stop reason: HOSPADM

## 2019-10-02 RX ORDER — SEVELAMER CARBONATE 800 MG/1
TABLET, FILM COATED ORAL SEE ADMIN INSTRUCTIONS
Status: DISCONTINUED | OUTPATIENT
Start: 2019-10-02 | End: 2019-10-05

## 2019-10-02 RX ORDER — LIDOCAINE 50 MG/G
1 PATCH TOPICAL DAILY
Status: DISCONTINUED | OUTPATIENT
Start: 2019-10-03 | End: 2019-10-05 | Stop reason: HOSPADM

## 2019-10-02 RX ORDER — VALACYCLOVIR HYDROCHLORIDE 500 MG/1
500 TABLET, FILM COATED ORAL 2 TIMES DAILY PRN
Status: DISCONTINUED | OUTPATIENT
Start: 2019-10-02 | End: 2019-10-05 | Stop reason: HOSPADM

## 2019-10-02 RX ORDER — TRAZODONE HYDROCHLORIDE 50 MG/1
50 TABLET ORAL
Status: DISCONTINUED | OUTPATIENT
Start: 2019-10-02 | End: 2019-10-05 | Stop reason: HOSPADM

## 2019-10-02 RX ORDER — AMLODIPINE BESYLATE 5 MG/1
10 TABLET ORAL
Status: DISCONTINUED | OUTPATIENT
Start: 2019-10-02 | End: 2019-10-05 | Stop reason: HOSPADM

## 2019-10-02 RX ORDER — OXYCODONE HYDROCHLORIDE 10 MG/1
20 TABLET ORAL 4 TIMES DAILY PRN
Status: DISCONTINUED | OUTPATIENT
Start: 2019-10-02 | End: 2019-10-05 | Stop reason: HOSPADM

## 2019-10-02 RX ORDER — ONDANSETRON 4 MG/1
4 TABLET, ORALLY DISINTEGRATING ORAL EVERY 4 HOURS PRN
Status: DISCONTINUED | OUTPATIENT
Start: 2019-10-02 | End: 2019-10-05 | Stop reason: HOSPADM

## 2019-10-02 RX ORDER — BISACODYL 10 MG
10 SUPPOSITORY, RECTAL RECTAL
Status: DISCONTINUED | OUTPATIENT
Start: 2019-10-02 | End: 2019-10-05 | Stop reason: HOSPADM

## 2019-10-02 RX ORDER — OMEPRAZOLE 20 MG/1
20 CAPSULE, DELAYED RELEASE ORAL DAILY
Status: DISCONTINUED | OUTPATIENT
Start: 2019-10-03 | End: 2019-10-05 | Stop reason: HOSPADM

## 2019-10-02 RX ORDER — PREGABALIN 25 MG/1
75 CAPSULE ORAL 3 TIMES DAILY
Status: DISCONTINUED | OUTPATIENT
Start: 2019-10-02 | End: 2019-10-05 | Stop reason: HOSPADM

## 2019-10-02 RX ORDER — TIZANIDINE 4 MG/1
8 TABLET ORAL
Status: DISCONTINUED | OUTPATIENT
Start: 2019-10-02 | End: 2019-10-05 | Stop reason: HOSPADM

## 2019-10-02 RX ORDER — PROMETHAZINE HYDROCHLORIDE 25 MG/1
12.5-25 TABLET ORAL EVERY 4 HOURS PRN
Status: DISCONTINUED | OUTPATIENT
Start: 2019-10-02 | End: 2019-10-05 | Stop reason: HOSPADM

## 2019-10-02 RX ADMIN — HYDROXYCHLOROQUINE SULFATE 200 MG: 200 TABLET, FILM COATED ORAL at 20:48

## 2019-10-02 RX ADMIN — PREGABALIN 75 MG: 25 CAPSULE ORAL at 20:47

## 2019-10-02 RX ADMIN — MIDAZOLAM HYDROCHLORIDE 0.5 MG: 1 INJECTION, SOLUTION INTRAMUSCULAR; INTRAVENOUS at 17:46

## 2019-10-02 RX ADMIN — TRAZODONE HYDROCHLORIDE 50 MG: 50 TABLET ORAL at 20:48

## 2019-10-02 RX ADMIN — SENNOSIDES, DOCUSATE SODIUM 2 TABLET: 50; 8.6 TABLET, FILM COATED ORAL at 20:48

## 2019-10-02 RX ADMIN — TIZANIDINE 8 MG: 4 TABLET ORAL at 20:48

## 2019-10-02 RX ADMIN — MIDAZOLAM HYDROCHLORIDE 0.5 MG: 1 INJECTION, SOLUTION INTRAMUSCULAR; INTRAVENOUS at 15:55

## 2019-10-02 RX ADMIN — LACOSAMIDE 100 MG: 50 TABLET, FILM COATED ORAL at 20:44

## 2019-10-02 ASSESSMENT — LIFESTYLE VARIABLES
EVER_SMOKED: YES
PACK_YEARS: 9

## 2019-10-02 NOTE — ED NOTES
Pt to ED today for evaluation of seizures. Pt states started yesterday but does not know how many she has had. Pt shaking uncontrolably and in and out of consciousness but easily aroused with verbal stimuli. Pt states she has been taking her vimpat as prescribed.

## 2019-10-02 NOTE — ED TRIAGE NOTES
Chief Complaint   Patient presents with   • Seizure     Pt states she was supposed to have dialysis Monday, but missed it; has dialysis MWF; has been off her seizure medications for the last several days; pt states she has had several seizures; pt with tremors   • Altered Mental Status     BIB grandmother who was going to take her to dialysis today; pt acting confused per grandmother and self     Pt placed in wc. Pt confused and having difficulty answering questions. Pt with tremors in triage.

## 2019-10-02 NOTE — ED PROVIDER NOTES
ED Provider Note    CHIEF COMPLAINT  Chief Complaint   Patient presents with   • Seizure     Pt states she was supposed to have dialysis Monday, but missed it; has dialysis MWF; has been off her seizure medications for the last several days; pt states she has had several seizures; pt with tremors   • Altered Mental Status     BIB grandmother who was going to take her to dialysis today; pt acting confused per grandmother and self       HPI  Debby Carrizales is a 39 y.o. female who presents with a report from her grandmother who brought her and that the patient has last been dialyzed this past Friday and missed dialysis on Monday.  She was going to go on Tuesday but then missed that appointment as well and rather than going for dialysis today they came to the emergency department because she was shaking and somewhat confused.  Patient reports that she sees Dr. Wooten for nephrology.  She denies any fevers, chills, sweats or pain.  She comes in as a walk-in patient    REVIEW OF SYSTEMS  See HPI for further details. All other systems are negative.     PAST MEDICAL HISTORY  Past Medical History:   Diagnosis Date   • Arthritis     all joints,r/t lupus   • Avascular necrosis of bones of both hips ([facility]) 10/10/2016   • Clostridium difficile colitis 5/3/2011   • Dialysis patient    • ESBL (extended spectrum beta-lactamase) producing bacteria infection 8/25/2014   • Fibromyalgia    • Hypertension    • Lupus ([facility])    • Pneumonia    • Psychiatric disorder     anxiety, depression   • Pyelonephritis 11/21/2017   • Renal failure    • Sepsis due to pneumonia ([facility]) 6/7/2018   • Status epilepticus ([facility]) 12/13/2018       FAMILY HISTORY  Family History   Problem Relation Age of Onset   • Heart Disease Mother    • Cancer Father        SOCIAL HISTORY   reports that she quit smoking about 8 years ago. Her smoking use included cigarettes. She started smoking about 24 years ago. She has a 9.00 pack-year smoking history. She  has never used smokeless tobacco. She reports that she does not drink alcohol or use drugs.    SURGICAL HISTORY  Past Surgical History:   Procedure Laterality Date   • GAYATHRI N/A 8/20/2019    Procedure: ECHOCARDIOGRAM, TRANSESOPHAGEAL;  Surgeon: Marta Elaine M.D.;  Location: [facility];  Service: Cardiac   • AV FISTULA REVISION Right 8/11/2018    Procedure: AV FISTULA REVISION- Graft and Thrombectomy;  Surgeon: Shabbir Ardon M.D.;  Location: [facility];  Service: General   • AV FISTULA THROMBOLYSIS Right 8/11/2018    Procedure: AV FISTULA THROMBOLYSIS;  Surgeon: Shabbir Ardon M.D.;  Location: [facility];  Service: General   • AV FISTULA CREATION Right 12/9/2017    Procedure: AV FISTULA CREATION-ARM FOR GRAFT;  Surgeon: Lesly Jansen M.D.;  Location: [facility];  Service: General   • THROMBECTOMY  12/9/2017    Procedure: THROMBECTOMY;  Surgeon: Lesly Jansen M.D.;  Location: [facility];  Service: General   • THROMBECTOMY Right 8/21/2016    Procedure: THROMBECTOMY - right AV fistula graft with grams;  Surgeon: Shabbir Ardon M.D.;  Location: [facility];  Service:    • ANGIOPLASTY BALLOON  8/21/2016    Procedure: ANGIOPLASTY BALLOON;  Surgeon: Shabbir Ardon M.D.;  Location: [facility];  Service:    • THROMBECTOMY Right 8/20/2016    Procedure: THROMBECTOMY AV GRAFT;  Surgeon: Shabbir Ardon M.D.;  Location: [facility];  Service:    • AV FISTULA CREATION Right 7/12/2016    Procedure: AV FISTULA CREATION WITH GRAFT BRACHIAL AXILLARY;  Surgeon: Shabbir Ardon M.D.;  Location: [facility];  Service:    • CLOSED REDUCTION Right 7/5/2016    Procedure: CLOSED REDUCTION- Hip ;  Surgeon: Michael Holman M.D.;  Location: [facility];  Service:    • HIP ARTHROPLASTY TOTAL Right 1/18/2016    Procedure: HIP ARTHROPLASTY TOTAL;  Surgeon: Michael Holman M.D.;  Location:  "SURGERY [facility];  Service:    • AV FISTULA CREATION  2/3/2015    Performed by Shabbir Ardon M.D. at SURGERY [facility]   • AV FISTULA CREATION  11/14/2014    Performed by Shabbir Ardon M.D. at SURGERY [facility]   • AV FISTULA CREATION  9/9/2014    Performed by Shabbir Ardon M.D. at SURGERY [facility]   • CATH PLACEMENT  9/9/2014    Performed by Shabbir Ardon M.D. at SURGERY [facility]   • OTHER  5/2011    tracheostomy   • GASTROSCOPY-ENDO  4/27/2011    Performed by PALMIRA DOAN at ENDOSCOPY [facility]   • COLONOSCOPY WITH BIOPSY  4/20/2011    Performed by ALCIRA CRUZ at ENDOSCOPY [facility]   • GASTROSCOPY-ENDO  4/18/2011    Performed by ALCIRA CRUZ at ENDOSCOPY [facility]   • GASTROSCOPY-ENDO  4/10/2011    Performed by SYED JURADO at ENDOSCOPY [facility]   • SCLEROTHERAPHY  4/10/2011    Performed by SYED JURADO at ENDOSCOPY [facility]   • OTHER ABDOMINAL SURGERY      kidney biopsy   • TRACHEOSTOMY         CURRENT MEDICATIONS  Home Medications    **Home medications have not yet been reviewed for this encounter**         ALLERGIES  Allergies   Allergen Reactions   • Lorazepam [Ativan] Anxiety     Patient becomes severely paranoid and agitated   • Morphine Itching     Tolerates Dilaudid   • Seasonal Runny Nose and Itching     Hay fever, sabiha brush       PHYSICAL EXAM  VITAL SIGNS: /45   Pulse (!) 107   Temp 36.8 °C (98.2 °F) (Temporal)   Resp 18   Ht 1.651 m (5' 5\")   Wt 81.9 kg (180 lb 8.9 oz)   SpO2 92%   BMI 30.05 kg/m²    Constitutional: Well developed, Well nourished, No acute distress, moderately ill appearance.   HENT: Normocephalic, Atraumatic, Bilateral external ears normal, Oropharynx is clear mucous membranes are dry. No oral exudates or nasal discharge.   Eyes: Pupils are equal round and reactive, EOMI, Conjunctiva normal, No discharge.   Neck: Normal range of motion, No tenderness, " Supple, No stridor. No meningismus.  Lymphatic: No lymphadenopathy noted.   Cardiovascular: Regular rate and rhythm without murmur rub or gallop.  Thorax & Lungs: Clear breath sounds bilaterally without wheezes, rhonchi or rales. There is no chest wall tenderness.   Abdomen: Soft non-tender non-distended. There is no rebound or guarding. No organomegaly is appreciated. Bowel sounds are normal.  Skin: Normal without rash.   Back: No CVA or spinal tenderness.   Extremities: Intact distal pulses, No edema, No tenderness, No cyanosis, No clubbing. Capillary refill is less than 2 seconds.  Musculoskeletal: Good range of motion in all major joints. No tenderness to palpation or major deformities noted.   Neurologic: Alert & oriented x 3, a bit slow to respond but conversant, has tremors of movement but at rest no tremor, No focal deficits noted. Reflexes are normal.  Psychiatric: Affect normal, Judgment normal, Mood somewhat depressed. There is no suicidal ideation or patient reported hallucinations.       COURSE & MEDICAL DECISION MAKING  Pertinent Labs & Imaging studies reviewed. (See chart for details)  The patient had initially reported seizures but on further history denies any overt seizure where she lost consciousness and rather reports that she has had tremors over the past couple of days.  She came as a walk-in and I admittedly gave her some Versed as she is allergic to Ativan to calm her nervous system.    I was concerned about acidemia versus electrolyte derangements and ordered lab work which shows that she does have some anemia of chronic disease with a hemoglobin of 8.2 down from 10.1 last month.  Has no leukocytosis or significant shift.  Does have a monocytosis of 14 but this is long-standing.  Anion gap is elevated today at 27 with a bicarb of 17 indicating metabolic acidemia.  Potassium is 5.1.  BUN and creatinine are 78 and 15.2 consistent with a chronic renal failure in need of dialysis    I spoke to  Dr. Shaver regarding the patient's need for dialysis in the next 12 to 24 hours.  Her potassium is 5.1 and there is no emergent indication at this time but she does have acidemia.  Dr. Shaver indicated approximately 445 that she would do her best to contact the dialysis nurse for dialysis either later tonight or early tomorrow morning.    Spoke with Dr. Marlow at approximately 5 PM regarding the patient's need for admission.  Patient's plan of care is updated with family and she understands her need for admission and dialysis    FINAL IMPRESSION  1. Tremors of nervous system    2. Metabolic acidosis    3. Chronic renal failure, unspecified CKD stage    4. Confused             Electronically signed by: Akira Ashley, 10/2/2019 3:30 PM

## 2019-10-03 LAB
ANION GAP SERPL CALC-SCNC: 22 MMOL/L (ref 0–11.9)
BASOPHILS # BLD AUTO: 0.8 % (ref 0–1.8)
BASOPHILS # BLD: 0.03 K/UL (ref 0–0.12)
BUN SERPL-MCNC: 39 MG/DL (ref 8–22)
CALCIUM SERPL-MCNC: 9.2 MG/DL (ref 8.4–10.2)
CHLORIDE SERPL-SCNC: 92 MMOL/L (ref 96–112)
CO2 SERPL-SCNC: 23 MMOL/L (ref 20–33)
CREAT SERPL-MCNC: 9.22 MG/DL (ref 0.5–1.4)
EOSINOPHIL # BLD AUTO: 0.12 K/UL (ref 0–0.51)
EOSINOPHIL NFR BLD: 3.3 % (ref 0–6.9)
ERYTHROCYTE [DISTWIDTH] IN BLOOD BY AUTOMATED COUNT: 41.5 FL (ref 35.9–50)
GLUCOSE SERPL-MCNC: 73 MG/DL (ref 65–99)
HAV IGM SERPL QL IA: NEGATIVE
HBV CORE IGM SER QL: NEGATIVE
HBV SURFACE AB SERPL IA-ACNC: <3.1 MIU/ML (ref 0–10)
HBV SURFACE AG SER QL: NEGATIVE
HCT VFR BLD AUTO: 24.6 % (ref 37–47)
HCV AB SER QL: NEGATIVE
HGB BLD-MCNC: 8.2 G/DL (ref 12–16)
IMM GRANULOCYTES # BLD AUTO: 0.01 K/UL (ref 0–0.11)
IMM GRANULOCYTES NFR BLD AUTO: 0.3 % (ref 0–0.9)
LYMPHOCYTES # BLD AUTO: 0.61 K/UL (ref 1–4.8)
LYMPHOCYTES NFR BLD: 16.7 % (ref 22–41)
MCH RBC QN AUTO: 31.4 PG (ref 27–33)
MCHC RBC AUTO-ENTMCNC: 33.3 G/DL (ref 33.6–35)
MCV RBC AUTO: 94.3 FL (ref 81.4–97.8)
MONOCYTES # BLD AUTO: 0.58 K/UL (ref 0–0.85)
MONOCYTES NFR BLD AUTO: 15.9 % (ref 0–13.4)
NEUTROPHILS # BLD AUTO: 2.3 K/UL (ref 2–7.15)
NEUTROPHILS NFR BLD: 63 % (ref 44–72)
NRBC # BLD AUTO: 0 K/UL
NRBC BLD-RTO: 0 /100 WBC
PLATELET # BLD AUTO: 132 K/UL (ref 164–446)
PMV BLD AUTO: 9.1 FL (ref 9–12.9)
POTASSIUM SERPL-SCNC: 4.4 MMOL/L (ref 3.6–5.5)
RBC # BLD AUTO: 2.61 M/UL (ref 4.2–5.4)
SODIUM SERPL-SCNC: 137 MMOL/L (ref 135–145)
WBC # BLD AUTO: 3.7 K/UL (ref 4.8–10.8)

## 2019-10-03 PROCEDURE — 770020 HCHG ROOM/CARE - TELE (206)

## 2019-10-03 PROCEDURE — 80048 BASIC METABOLIC PNL TOTAL CA: CPT

## 2019-10-03 PROCEDURE — 85025 COMPLETE CBC W/AUTO DIFF WBC: CPT

## 2019-10-03 PROCEDURE — 700101 HCHG RX REV CODE 250: Performed by: HOSPITALIST

## 2019-10-03 PROCEDURE — A9270 NON-COVERED ITEM OR SERVICE: HCPCS | Performed by: HOSPITALIST

## 2019-10-03 PROCEDURE — 700102 HCHG RX REV CODE 250 W/ 637 OVERRIDE(OP): Performed by: HOSPITALIST

## 2019-10-03 PROCEDURE — 5A1D70Z PERFORMANCE OF URINARY FILTRATION, INTERMITTENT, LESS THAN 6 HOURS PER DAY: ICD-10-PCS | Performed by: INTERNAL MEDICINE

## 2019-10-03 PROCEDURE — 99233 SBSQ HOSP IP/OBS HIGH 50: CPT | Performed by: HOSPITALIST

## 2019-10-03 PROCEDURE — 99232 SBSQ HOSP IP/OBS MODERATE 35: CPT | Performed by: INTERNAL MEDICINE

## 2019-10-03 PROCEDURE — 700111 HCHG RX REV CODE 636 W/ 250 OVERRIDE (IP): Performed by: HOSPITALIST

## 2019-10-03 PROCEDURE — 90935 HEMODIALYSIS ONE EVALUATION: CPT

## 2019-10-03 RX ORDER — LORAZEPAM 2 MG/ML
2 INJECTION INTRAMUSCULAR
Status: DISCONTINUED | OUTPATIENT
Start: 2019-10-03 | End: 2019-10-05 | Stop reason: HOSPADM

## 2019-10-03 RX ADMIN — LIDOCAINE 1 PATCH: 50 PATCH TOPICAL at 05:26

## 2019-10-03 RX ADMIN — PREGABALIN 75 MG: 25 CAPSULE ORAL at 02:18

## 2019-10-03 RX ADMIN — LORAZEPAM 2 MG: 2 INJECTION INTRAMUSCULAR; INTRAVENOUS at 15:28

## 2019-10-03 ASSESSMENT — COGNITIVE AND FUNCTIONAL STATUS - GENERAL
HELP NEEDED FOR BATHING: A LITTLE
MOBILITY SCORE: 15
SUGGESTED CMS G CODE MODIFIER DAILY ACTIVITY: CK
SUGGESTED CMS G CODE MODIFIER MOBILITY: CK
PERSONAL GROOMING: A LITTLE
MOVING FROM LYING ON BACK TO SITTING ON SIDE OF FLAT BED: A LOT
WALKING IN HOSPITAL ROOM: A LOT
EATING MEALS: A LITTLE
MOVING TO AND FROM BED TO CHAIR: A LITTLE
DRESSING REGULAR LOWER BODY CLOTHING: A LITTLE
DRESSING REGULAR UPPER BODY CLOTHING: A LITTLE
DAILY ACTIVITIY SCORE: 17
TOILETING: A LOT
STANDING UP FROM CHAIR USING ARMS: A LOT
CLIMB 3 TO 5 STEPS WITH RAILING: A LOT

## 2019-10-03 NOTE — PROGRESS NOTES
Telemetry Shift Summary    Rhythm SR, ST  HR Range   Ectopy none  Measurements 0.16/0.08/0.32        Normal Values  Rhythm SR  HR Range    Measurements 0.12-0.20 / 0.06-0.10  / 0.30-0.52

## 2019-10-03 NOTE — CARE PLAN
Problem: Safety  Goal: Will remain free from injury  Outcome: PROGRESSING AS EXPECTED  Note:   Keep call light within reach. Ensure environment is clutter free. Have patient wear treaded socks.

## 2019-10-03 NOTE — PROGRESS NOTES
Pt very lethargic, with full body twitches.  Difficult to arouse with verbal stimuli, required tactile stimuli for pt to open her eyes.  Asked pt for name multiple times, did not answer, sleeping in between tactile stimuli.  Pt was able to answer where she was 'renown'.

## 2019-10-03 NOTE — CONSULTS
DATE OF SERVICE:  10/02/2019    NEPHROLOGY CONSULTATION    REQUESTING PHYSICIAN:  Akira Ashley MD    REASON FOR CONSULTATION:  To evaluate and provide dialysis for patient with   end-stage renal disease.    HISTORY OF PRESENT ILLNESS:  The patient is a 39-year-old female with   end-stage renal disease, on dialysis, who has been receiving her dialysis   treatments on Monday, Wednesday, Friday.  Last time, patient completed   dialysis on Friday.  She missed her dialysis on Monday and today.  States that   she had been off seizure medication, tried to obtain them.  This morning, her   grandmother found her obtunded, shaky, restless.  At this point, she brought   her to the emergency room.  Currently, patient received Versed, sedated, not   able to provide her history.    REVIEW OF SYSTEMS:  Not possible to obtain as patient is sedated.    PAST MEDICAL HISTORY:  End-stage renal disease, on hemodialysis, hypertension,   lupus, pneumonia, sepsis, status epilepticus, avascular necrosis of both   hips, Clostridium difficile, arthritis, fibromyalgia, hypertension.    PAST SURGICAL HISTORY:  GAYATHRI, AV fistula creation, abdominal surgery,   gastroscopy, dialysis catheter placement.    OUTPATIENT MEDICATIONS:  Reviewed.    ALLERGIES:  ALLERGIC TO LORAZEPAM, MORPHINE and has seasonal allergies.    PHYSICAL EXAMINATION:  VITAL SIGNS:  Blood pressure 160/50, pulse 107, temperature 36.8 Celsius.  GENERAL APPEARANCE:  Well-developed, well-nourished female, sedated, not in   acute distress.  HEENT:  Normocephalic, atraumatic.  Pupils equal, round, reactive to light.  NECK:  Supple, no lymphadenopathy, no thyromegaly appreciated.  LUNGS:  Clear to auscultation bilaterally.  No rales, no wheezes, no rhonchi.  HEART:  Regular rate.  No rub or gallop.  ABDOMEN:  Soft, nontender, nondistended.  Bowel sounds present.  EXTREMITIES:  No cyanosis, no clubbing, no edema.  NEUROLOGIC:  Patient is sedated.    LABORATORY DATA:  Hemoglobin level 8.2.   Sodium 133, potassium 5.1, CO2 of 17,   BUN 78 and creatinine 15.2.    ASSESSMENT AND PLAN:  Patient is a 39-year-old female with multiple medical   problems, end-stage renal disease, on hemodialysis, who missed her dialysis   treatment, admitted with confusion, restless.  1.  End-stage renal disease.  We will dialyze patient today.  Continue daily   for now.  2.  Electrolytes, mild hyponatremia, to monitor.  Avoid hypotonic solutions.  3.  Metabolic acidosis.  We will be correcting with dialysis.  4.  Volume.  We will ultrafiltrate with hemodialysis as blood pressure   tolerates.  5.  Hypertension, slightly elevated blood pressure, could be due to volume   overload from missing dialysis.    RECOMMENDATIONS:  Daily dialysis, daily monitoring of basic metabolic panel,   renal diet, all medications adjust to renal doses.    Thank you for the consult.       ____________________________________     MD GHAZAL COX / ISSAC    DD:  10/02/2019 18:34:06  DT:  10/02/2019 23:10:11    D#:  [id number]  Job#:  [id number]

## 2019-10-03 NOTE — H&P
Hospital Medicine History & Physical Note    Date of Service  10/2/2019    Primary Care Physician  Sushila Manzano M.D.    Consultants  nephrology    Code Status  full    Chief Complaint  none- confused    History of Presenting Illness  39 y.o. female who presented 10/2/2019 with tremors, confusion and possible seizures at home. She is a dialysis patient and apparently missed dialysis on monday this week. She is unable to give a history due to confusion. Her grandmother says she saw twitching at home and then confusion. She has a known history of a seizure disorder and was unable to fill her vimpat on saturday. She had to fill it on monday. She says she had enough and did not miss doses but this is not clear.     I discussed this patient with the ED physician and nephrology tonight including labs and imaging.     Review of Systems  Review of Systems   Unable to perform ROS: Medical condition       Past Medical History   has a past medical history of Arthritis, Avascular necrosis of bones of both hips ([facility]) (10/10/2016), Clostridium difficile colitis (5/3/2011), Dialysis patient, ESBL (extended spectrum beta-lactamase) producing bacteria infection (8/25/2014), Fibromyalgia, Hypertension, Lupus ([facility]), Pneumonia (), Psychiatric disorder, Pyelonephritis (11/21/2017), Renal failure, Sepsis due to pneumonia ([facility]) (6/7/2018), and Status epilepticus ([facility]) (12/13/2018). She also has no past medical history of Chronic airway obstruction, not elsewhere classified or Fall.    Surgical History   has a past surgical history that includes gastroscopy-endo (4/10/2011); sclerotheraphy (4/10/2011); gastroscopy-endo (4/18/2011); colonoscopy with biopsy (4/20/2011); gastroscopy-endo (4/27/2011); other (5/2011); other abdominal surgery; av fistula creation (9/9/2014); cath placement (9/9/2014); av fistula creation (11/14/2014); av fistula creation (2/3/2015); hip arthroplasty total (Right, 1/18/2016); closed reduction (Right,  7/5/2016); av fistula creation (Right, 7/12/2016); thrombectomy (Right, 8/20/2016); thrombectomy (Right, 8/21/2016); angioplasty balloon (8/21/2016); tracheostomy; av fistula creation (Right, 12/9/2017); thrombectomy (12/9/2017); av fistula revision (Right, 8/11/2018); av fistula thrombolysis (Right, 8/11/2018); and tahir (N/A, 8/20/2019).     Family History  family history includes Cancer in her father; Heart Disease in her mother.     Social History   reports that she quit smoking about 8 years ago. Her smoking use included cigarettes. She started smoking about 24 years ago. She has a 9.00 pack-year smoking history. She has never used smokeless tobacco. She reports that she does not drink alcohol or use drugs.    Allergies  Allergies   Allergen Reactions   • Lorazepam [Ativan] Anxiety     Patient becomes severely paranoid and agitated   • Morphine Itching     Tolerates Dilaudid   • Seasonal Runny Nose and Itching     Hay fever, sabiha brush       Medications  Prior to Admission Medications   Prescriptions Last Dose Informant Patient Reported? Taking?   Cholecalciferol (VITAMIN D3) 27271 UNIT Cap  Patient Yes No   Sig: Take 10,000 Units by mouth every day.   Oxycodone HCl 20 MG Tab   No No   Sig: Take 1 Tab by mouth 4 times a day for 28 days.   amLODIPine (NORVASC) 10 MG Tab   No No   Sig: Take 1 Tab by mouth 1 time daily as needed (If blood pressure is > 170).   ascorbic acid (ASCORBIC ACID) 500 MG Tab  Patient Yes No   Sig: Take 500 mg by mouth every day.   docusate sodium (COLACE) 100 MG Cap  Patient Yes No   Sig: Take 100 mg by mouth every day.   ferrous sulfate 325 (65 FE) MG tablet  Patient Yes No   Sig: Take 325 mg by mouth every day.   hydroxychloroquine (PLAQUENIL) 200 MG Tab  Patient Yes No   Sig: Take 200 mg by mouth every bedtime.   lacosamide (VIMPAT) 100 MG Tab tablet  Patient Yes No   Sig: Take 100 mg by mouth 2 Times a Day.   lidocaine (LIDODERM) 5 % Patch  Patient Yes No   Sig: Apply 1 Patch to skin as  directed as needed (For back pain). On for 12 hours off for 12 hours    omeprazole (PRILOSEC) 20 MG delayed-release capsule  Patient No No   Sig: Take 1 Cap by mouth every day.   pregabalin (LYRICA) 75 MG Cap   No No   Sig: Take 1 Cap by mouth 3 times a day for 30 days.   promethazine (PHENERGAN) 25 MG Tab   No No   Sig: TAKE 1 TABLET BY MOUTH EVERY 8 HOURS AS NEEDED FOR NAUSEA OR VOMITING   promethazine (PHENERGAN) 25 MG Tab   No No   Sig: TAKE 1 TABLET BY MOUTH EVERY 8 HOURS AS NEEDED FOR NAUSEA OR VOMITING   sevelamer carbonate (RENVELA) 800 MG Tab tablet  Patient Yes No   Sig: Take 1,600-3,200 mg by mouth See Admin Instructions. 1,600 mg with snacks   3,200 mg with meals   tizanidine (ZANAFLEX) 4 MG Tab  Patient Yes No   Sig: Take 8 mg by mouth every bedtime.   traZODone (DESYREL) 50 MG Tab  Patient No No   Sig: Take 1 Tab by mouth every bedtime.   valACYclovir (VALTREX) 500 MG Tab   No No   Sig: Take 1 Tab by mouth 2 times a day as needed (cold sores).      Facility-Administered Medications: None       Physical Exam  Temp:  [36.8 °C (98.2 °F)] 36.8 °C (98.2 °F)  Pulse:  [] 91  Resp:  [12-18] 12  BP: (142-168)/() 142/81  SpO2:  [92 %-100 %] 99 %    Physical Exam   Constitutional: She appears well-developed and well-nourished. No distress.   Patient seen and examined  Discussed plan with RN   JUNITOT:   Right Ear: External ear normal.   Left Ear: External ear normal.   Nose: Nose normal.   Eyes: Conjunctivae are normal. Right eye exhibits no discharge. Left eye exhibits no discharge.   Neck: No JVD present.   Cardiovascular: Regular rhythm and normal heart sounds.   No murmur heard.  Cap refill 2sec  Pulses 2+ throughout     Pulmonary/Chest: Effort normal and breath sounds normal. No stridor. No respiratory distress. She has no wheezes. She has no rales.   Abdominal: Soft. Bowel sounds are normal. She exhibits no distension. There is no tenderness.   Musculoskeletal: She exhibits no edema or tenderness.    Neurological: She displays no tremor. She displays no seizure activity.   Lethargic/somnolent. Asterixis prominent.    Skin: Skin is warm and dry. She is not diaphoretic. No erythema.   Normal skin  Color.    Psychiatric: She has a normal mood and affect. Her behavior is normal.   Nursing note and vitals reviewed.      Laboratory:  Recent Labs     10/02/19  1545   WBC 4.9   RBC 2.59*   HEMOGLOBIN 8.2*   HEMATOCRIT 25.0*   MCV 96.5   MCH 31.7   MCHC 32.8*   RDW 42.0   PLATELETCT 138*   MPV 9.8     Recent Labs     10/02/19  1545   SODIUM 133*   POTASSIUM 5.1   CHLORIDE 89*   CO2 17*   GLUCOSE 94   BUN 78*   CREATININE 15.20*   CALCIUM 9.1     Recent Labs     10/02/19  1545   ALTSGPT 9   ASTSGOT 11*   ALKPHOSPHAT 91   TBILIRUBIN 0.4   GLUCOSE 94         No results for input(s): NTPROBNP in the last 72 hours.      No results for input(s): TROPONINT in the last 72 hours.    Urinalysis:    No results found     Imaging:  No orders to display         Assessment/Plan:  I anticipate this patient is appropriate for observation status at this time.    * Acute metabolic encephalopathy  Assessment & Plan  Possible from missing dialysis. Possible seizure from missing meds. Possible opiate overdose?   Nephrology to do dialysis  Hold opiates  Continue seizures meds.   Monitor on tele  Neuro checks  Low threshold to transfer to ICU with any worsening.     Drug-seeking behavior- (present on admission)  Assessment & Plan  Avoid escalation in meds.     ESRD (end stage renal disease) ([facility])- (present on admission)  Assessment & Plan  Missed dialysis monday. Nephrology will consult and emergently dialyze today and daily.     Lupus (systemic lupus erythematosus) ([facility])- (present on admission)  Assessment & Plan  No obvious flare. Continue meds.     Seizure disorder ([facility])  Assessment & Plan  Possible reason for acute encephalopathy- continue meds. She may have missed some doses but this is not clear.     Opioid dependence ([facility])- (present on  admission)  Assessment & Plan  Will not change home meds.       VTE prophylaxis: scd

## 2019-10-03 NOTE — ASSESSMENT & PLAN NOTE
Due to lupus nephritis  Nephrology dialyzing daily patient misses dialysis sessions frequently and is far behind in treatments.

## 2019-10-03 NOTE — FLOWSHEET NOTE
10/02/19 2028   Type of Assessment   Assessment Yes   Patient History   Pulmonary Diagnosis none   Home O2 Use Prior To Admission? No   Home Treatments/Frequency No   COPD Risk Screening   Do you have a history of COPD? No   COPD Population Screener   During the past 4 weeks, how much did you feel short of breath?   (unable to assess, patient confused)   Smoking History   Have you ever smoked Yes   Have you smoked in the last 12 months No   Have you quit smoking Yes   Confirm Quit Date 10/02/09   Smoking Pack Years 9   Level Of Consciousness   Level of Consciousness Confused   Respiratory WDL   Respiratory (WDL) WDL   Chest Exam   Respiration 12   Pulse 90   Breath Sounds   Pre/Post Intervention Pre Intervention Assessment   O2 Protocol   O2 (LPM) 0   Pulse Oximetry 97 %

## 2019-10-03 NOTE — PROGRESS NOTES
Report received from JOSETTE Hernandez. Pt denies needs at this time. Safety precautions in place. Call light within reach.

## 2019-10-03 NOTE — PROGRESS NOTES
Dialysis at bedside. Patient responds to verbal stimuli and follows simple commands but is confused and restless. Patient has body twitching. Patient is able to swallow PO medication but must be done one pill at a time. Unable to complete admit profile at this time due to patient being confused and no family at bedside. Will continue to monitor.

## 2019-10-03 NOTE — PROGRESS NOTES
Pt arrived to unit via [facility]. Slide boarded from rney to bed, x3 assist. Tele monitor applied, vitals taken. Pt assessed. A&O x2. Communication board filled out. Fall and seizure precautions in place. Pt left in lowest position. Bed locked and low, bed alarm on.

## 2019-10-03 NOTE — DISCHARGE PLANNING
Outpatient Dialysis Note    Confirmed patient is active at:    [facility]  [address]   [place] [address]    Schedule: Monday, Wednesday, Friday  Time: 3:45 pm    Spoke with Vicky at facility who confirmed.    Forwarded records for review.    Dialysis Coordinator, [facility]  Kelli Anderson [phone]

## 2019-10-03 NOTE — ED NOTES
Pt restless, pulling at monitoring equipment and attempting to jump off of bed. Pt safety ensured by ED Tech while MD notified and orders received.

## 2019-10-03 NOTE — CARE PLAN
Problem: Communication  Goal: The ability to communicate needs accurately and effectively will improve  Outcome: PROGRESSING SLOWER THAN EXPECTED  Note:   Allow time for patient to verbalize feelings and ask questions. Answer questions to best of ability. Update patient on plan of care.

## 2019-10-03 NOTE — PROGRESS NOTES
Patient more lethargic. Patient still responds to verbal stimuli and simple commands but RN feels it is unsafe to give PO medication. MD Louis notified, order received to hold PO medication and keep patient NPO. Will continue to monitor.

## 2019-10-03 NOTE — PROGRESS NOTES
2 RN skin check complete.   Devices in place tele box.  Skin assessed under devices intact.  Confirmed pressure ulcers found on n/a.  New potential pressure ulcers noted on n/a.   Stretch marks on abdomen.   The following interventions in place patient turns self side to side, patient ambulates ocassionally*

## 2019-10-03 NOTE — ASSESSMENT & PLAN NOTE
Appears due to seizures and being off of seizure meds  Chart reviewed and found this has happened before.  Back on Vimpat and receiving dialysis, mental status is improving

## 2019-10-03 NOTE — PROGRESS NOTES
Nephrology Daily Progress Note    Date of Service  10/3/2019    Chief Complaint  39 y.o. Female with ESRD/HD, admitted 10/2/2019 with AMS, missed dialysis    Interval Problem Update  10/3 - somnolent, following commends  Received dialysis last night  Plan to continue daily HD for now    Review of Systems  Review of Systems   Unable to perform ROS: Medical condition        Physical Exam  Temp:  [36.3 °C (97.4 °F)-36.9 °C (98.4 °F)] 36.9 °C (98.4 °F)  Pulse:  [] 100  Resp:  [12-28] 20  BP: (142-172)/() 170/99  SpO2:  [92 %-100 %] 93 %    Physical Exam   Constitutional: She is oriented to person, place, and time. She appears well-developed and well-nourished. No distress.   HENT:   Head: Normocephalic and atraumatic.   Nose: Nose normal.   Mouth/Throat: Oropharynx is clear and moist.   Eyes: Pupils are equal, round, and reactive to light. Conjunctivae and EOM are normal.   Neck: Normal range of motion. Neck supple. No thyromegaly present.   Cardiovascular: Normal rate and regular rhythm. Exam reveals no gallop and no friction rub.   Pulmonary/Chest: Effort normal and breath sounds normal. No respiratory distress. She has no wheezes. She has no rales.   Abdominal: Soft. Bowel sounds are normal. She exhibits no distension and no mass. There is no tenderness. There is no guarding.   Musculoskeletal: She exhibits edema.   Neurological: She is alert and oriented to person, place, and time.   Involuntary movement  somnolent   Skin: Skin is warm. No rash noted. No erythema.   Nursing note and vitals reviewed.      Fluids    Intake/Output Summary (Last 24 hours) at 10/3/2019 1231  Last data filed at 10/2/2019 2348  Gross per 24 hour   Intake 500 ml   Output 2500 ml   Net -2000 ml       Laboratory  Recent Labs     10/02/19  1545 10/03/19  0518   WBC 4.9 3.7*   RBC 2.59* 2.61*   HEMOGLOBIN 8.2* 8.2*   HEMATOCRIT 25.0* 24.6*   MCV 96.5 94.3   MCH 31.7 31.4   MCHC 32.8* 33.3*   RDW 42.0 41.5   PLATELETCT 138* 132*    MPV 9.8 9.1     Recent Labs     10/02/19  1545 10/03/19  0518   SODIUM 133* 137   POTASSIUM 5.1 4.4   CHLORIDE 89* 92*   CO2 17* 23   GLUCOSE 94 73   BUN 78* 39*   CREATININE 15.20* 9.22*   CALCIUM 9.1 9.2         No results for input(s): NTPROBNP in the last 72 hours.        Imaging  reviewed    Assessment/Plan  1.ESRD/HD -uremic -daily dialysis -will dialyze today afternoon  2.HTN: elevated BP -continue UF as tolerates to improve volume status and BP  3.Electrolytes: well controlled.  4.Anemia: low Hb level -Epogen with HD MWF  5.Metabolic acidosis corrected with dialysis  6.Volume:overloaded -UF with HD as tolerates    Recs: daily dialysis             Renal diet             Epogen with HD MWF             All meds to renal doses

## 2019-10-03 NOTE — ASSESSMENT & PLAN NOTE
Seizure activity resolved  continue vimpat  Ativan 2mg IV prn to control twitching and repetetitive behavior.  Prior EEG abnormal one month ago do not need to repeat

## 2019-10-03 NOTE — ASSESSMENT & PLAN NOTE
Avoid escalation in meds she is starting to beg for IV pain medication  Reassured and continued oral oxycodone only.

## 2019-10-04 PROCEDURE — 770020 HCHG ROOM/CARE - TELE (206)

## 2019-10-04 PROCEDURE — A9270 NON-COVERED ITEM OR SERVICE: HCPCS

## 2019-10-04 PROCEDURE — 700111 HCHG RX REV CODE 636 W/ 250 OVERRIDE (IP): Performed by: INTERNAL MEDICINE

## 2019-10-04 PROCEDURE — 90935 HEMODIALYSIS ONE EVALUATION: CPT

## 2019-10-04 PROCEDURE — 99233 SBSQ HOSP IP/OBS HIGH 50: CPT | Performed by: HOSPITALIST

## 2019-10-04 PROCEDURE — 700105 HCHG RX REV CODE 258: Performed by: HOSPITALIST

## 2019-10-04 PROCEDURE — 700102 HCHG RX REV CODE 250 W/ 637 OVERRIDE(OP)

## 2019-10-04 PROCEDURE — 700101 HCHG RX REV CODE 250: Performed by: HOSPITALIST

## 2019-10-04 PROCEDURE — 99232 SBSQ HOSP IP/OBS MODERATE 35: CPT | Performed by: INTERNAL MEDICINE

## 2019-10-04 PROCEDURE — 700102 HCHG RX REV CODE 250 W/ 637 OVERRIDE(OP): Performed by: HOSPITALIST

## 2019-10-04 PROCEDURE — A9270 NON-COVERED ITEM OR SERVICE: HCPCS | Performed by: HOSPITALIST

## 2019-10-04 PROCEDURE — 5A1D70Z PERFORMANCE OF URINARY FILTRATION, INTERMITTENT, LESS THAN 6 HOURS PER DAY: ICD-10-PCS | Performed by: INTERNAL MEDICINE

## 2019-10-04 RX ORDER — SODIUM CHLORIDE 9 MG/ML
1000 INJECTION, SOLUTION INTRAVENOUS ONCE
Status: COMPLETED | OUTPATIENT
Start: 2019-10-04 | End: 2019-10-04

## 2019-10-04 RX ORDER — OXYCODONE HYDROCHLORIDE 5 MG/1
TABLET ORAL
Status: COMPLETED
Start: 2019-10-04 | End: 2019-10-04

## 2019-10-04 RX ADMIN — HYDROXYCHLOROQUINE SULFATE 200 MG: 200 TABLET, FILM COATED ORAL at 20:43

## 2019-10-04 RX ADMIN — FERROUS SULFATE TAB 325 MG (65 MG ELEMENTAL FE) 325 MG: 325 (65 FE) TAB at 05:48

## 2019-10-04 RX ADMIN — TRAZODONE HYDROCHLORIDE 50 MG: 50 TABLET ORAL at 20:44

## 2019-10-04 RX ADMIN — SODIUM CHLORIDE 1000 ML: 9 INJECTION, SOLUTION INTRAVENOUS at 21:57

## 2019-10-04 RX ADMIN — PREGABALIN 75 MG: 25 CAPSULE ORAL at 03:14

## 2019-10-04 RX ADMIN — LIDOCAINE 1 PATCH: 50 PATCH TOPICAL at 05:48

## 2019-10-04 RX ADMIN — LACOSAMIDE 100 MG: 50 TABLET, FILM COATED ORAL at 18:20

## 2019-10-04 RX ADMIN — OXYCODONE HYDROCHLORIDE 20 MG: 5 TABLET ORAL at 00:34

## 2019-10-04 RX ADMIN — OXYCODONE HYDROCHLORIDE 20 MG: 10 TABLET ORAL at 20:47

## 2019-10-04 RX ADMIN — LACOSAMIDE 100 MG: 50 TABLET, FILM COATED ORAL at 05:54

## 2019-10-04 RX ADMIN — ERYTHROPOIETIN 10000 UNITS: 10000 INJECTION, SOLUTION INTRAVENOUS; SUBCUTANEOUS at 16:18

## 2019-10-04 RX ADMIN — OXYCODONE HYDROCHLORIDE 20 MG: 10 TABLET ORAL at 00:34

## 2019-10-04 RX ADMIN — OMEPRAZOLE 20 MG: 20 CAPSULE, DELAYED RELEASE ORAL at 05:48

## 2019-10-04 RX ADMIN — PREGABALIN 75 MG: 25 CAPSULE ORAL at 18:21

## 2019-10-04 RX ADMIN — TIZANIDINE 8 MG: 4 TABLET ORAL at 20:44

## 2019-10-04 RX ADMIN — PREGABALIN 75 MG: 25 CAPSULE ORAL at 12:09

## 2019-10-04 RX ADMIN — AMLODIPINE BESYLATE 10 MG: 5 TABLET ORAL at 00:36

## 2019-10-04 RX ADMIN — OXYCODONE HYDROCHLORIDE 20 MG: 10 TABLET ORAL at 12:54

## 2019-10-04 ASSESSMENT — LIFESTYLE VARIABLES
AVERAGE NUMBER OF DAYS PER WEEK YOU HAVE A DRINK CONTAINING ALCOHOL: 0
TOTAL SCORE: 0
HAVE YOU EVER FELT YOU SHOULD CUT DOWN ON YOUR DRINKING: NO
EVER HAD A DRINK FIRST THING IN THE MORNING TO STEADY YOUR NERVES TO GET RID OF A HANGOVER: NO
HOW MANY TIMES IN THE PAST YEAR HAVE YOU HAD 5 OR MORE DRINKS IN A DAY: 0
EVER FELT BAD OR GUILTY ABOUT YOUR DRINKING: NO
TOTAL SCORE: 0
ON A TYPICAL DAY WHEN YOU DRINK ALCOHOL HOW MANY DRINKS DO YOU HAVE: 0
CONSUMPTION TOTAL: NEGATIVE
HAVE PEOPLE ANNOYED YOU BY CRITICIZING YOUR DRINKING: NO
TOTAL SCORE: 0
ALCOHOL_USE: NO

## 2019-10-04 ASSESSMENT — ENCOUNTER SYMPTOMS
PALPITATIONS: 0
ABDOMINAL PAIN: 0
FEVER: 0
ORTHOPNEA: 0
SHORTNESS OF BREATH: 0
NAUSEA: 0
CHILLS: 0
MYALGIAS: 0
WHEEZING: 0
WEIGHT LOSS: 0
SINUS PAIN: 0
HEMOPTYSIS: 0
DIARRHEA: 0
EYES NEGATIVE: 1
VOMITING: 0
BACK PAIN: 1
COUGH: 0

## 2019-10-04 NOTE — PROGRESS NOTES
Received report from JOSETTE Green. Patient laying in bed comfortably. POC discussed. Patient denies any other needs at this time. Will continue to monitor. Safety precautions in place. Call bell within reach.

## 2019-10-04 NOTE — PROGRESS NOTES
Nephrology Daily Progress Note    Date of Service  10/4/2019    Chief Complaint  39 y.o. Female with ESRD/HD, admitted 10/2/2019 with AMS, missed dialysis    Interval Problem Update  10/3 - somnolent, following commends  Received dialysis last night  Plan to continue daily HD for now  10/04  Feels much better  No acute events  No seizures activity  AAO  Scheduled for dialysis today    Review of Systems  Review of Systems   Constitutional: Negative for chills, fever, malaise/fatigue and weight loss.   HENT: Negative for congestion, hearing loss and sinus pain.    Eyes: Negative.    Respiratory: Negative for cough, hemoptysis, shortness of breath and wheezing.    Cardiovascular: Negative for chest pain, palpitations, orthopnea and leg swelling.   Gastrointestinal: Negative for abdominal pain, diarrhea, nausea and vomiting.   Musculoskeletal: Positive for back pain. Negative for myalgias.   Skin: Negative.    All other systems reviewed and are negative.       Physical Exam  Temp:  [36.8 °C (98.2 °F)-37.3 °C (99.1 °F)] 37.3 °C (99.1 °F)  Pulse:  [] 98  Resp:  [14-18] 18  BP: (151-173)/() 151/81  SpO2:  [93 %-99 %] 93 %    Physical Exam   Constitutional: She is oriented to person, place, and time. She appears well-developed and well-nourished. No distress.   HENT:   Head: Normocephalic and atraumatic.   Nose: Nose normal.   Mouth/Throat: Oropharynx is clear and moist.   Eyes: Pupils are equal, round, and reactive to light. Conjunctivae and EOM are normal.   Neck: Normal range of motion. Neck supple. No thyromegaly present.   Cardiovascular: Normal rate and regular rhythm. Exam reveals no gallop and no friction rub.   Pulmonary/Chest: Effort normal and breath sounds normal. No respiratory distress. She has no wheezes. She has no rales.   Abdominal: Soft. Bowel sounds are normal. She exhibits no distension and no mass. There is no tenderness. There is no guarding.   Musculoskeletal: She exhibits no edema.    Neurological: She is alert and oriented to person, place, and time. No cranial nerve deficit.   Skin: Skin is warm. No rash noted. No erythema.   Nursing note and vitals reviewed.      Fluids    Intake/Output Summary (Last 24 hours) at 10/4/2019 1333  Last data filed at 10/3/2019 1800  Gross per 24 hour   Intake 500 ml   Output 3300 ml   Net -2800 ml       Laboratory  Recent Labs     10/02/19  1545 10/03/19  0518   WBC 4.9 3.7*   RBC 2.59* 2.61*   HEMOGLOBIN 8.2* 8.2*   HEMATOCRIT 25.0* 24.6*   MCV 96.5 94.3   MCH 31.7 31.4   MCHC 32.8* 33.3*   RDW 42.0 41.5   PLATELETCT 138* 132*   MPV 9.8 9.1     Recent Labs     10/02/19  1545 10/03/19  0518   SODIUM 133* 137   POTASSIUM 5.1 4.4   CHLORIDE 89* 92*   CO2 17* 23   GLUCOSE 94 73   BUN 78* 39*   CREATININE 15.20* 9.22*   CALCIUM 9.1 9.2         No results for input(s): NTPROBNP in the last 72 hours.        Imaging  reviewed    Assessment/Plan  1.ESRD/HD - on MWF, will dialyze today  2.HTN: elevated BP -continue UF as tolerates to improve volume status and BP  3.Electrolytes: well controlled.  4.Anemia: low Hb level -Epogen with HD MWF  5.Metabolic acidosis corrected with dialysis  6.Volume:overloaded -UF with HD as tolerates    Recs: HD today             To monitor Hb, BMP             Renal diet             Epogen with HD MWF             All meds to renal doses

## 2019-10-04 NOTE — PROGRESS NOTES
"[facility] Medicine Daily Progress Note    Date of Service  10/3/2019    Chief Complaint  39 y.o. female admitted 10/2/2019 with missed dialysis    Hospital Course    History of Presenting Illness  39 y.o. female who presented 10/2/2019 with tremors, confusion and possible seizures at home. She is a dialysis patient and apparently missed dialysis on monday this week. She is unable to give a history due to confusion. Her grandmother says she saw twitching at home and then confusion. She has a known history of a seizure disorder and was unable to fill her vimpat on saturday. She had to fill it on monday. She says she had enough and did not miss doses but this is not clear.       Interval Problem Update  10/3: having twitching of extremities, twitching of left face, repeating words \"Hi\" over and over. Grandmother at bedside states patient missed doses of Vimpat, missed dialysis due to the above symptoms.    Consultants/Specialty  Nephrology    Code Status  full    Disposition  home    Review of Systems  Review of Systems   Unable to perform ROS: Acuity of condition        Physical Exam  Temp:  [36.3 °C (97.4 °F)-36.9 °C (98.4 °F)] 36.8 °C (98.2 °F)  Pulse:  [] 93  Resp:  [12-20] 17  BP: (146-172)/(54-99) 154/93  SpO2:  [93 %-100 %] 93 %    Physical Exam   Constitutional: She appears well-developed and well-nourished. She appears ill. No distress.   Plan of care discussed with bedside RN.   HENT:   Nose: Nose normal.   Mouth/Throat: Oropharynx is clear and moist. No oropharyngeal exudate.   Eyes: Conjunctivae are normal. Right eye exhibits no discharge. Left eye exhibits no discharge. No scleral icterus.   Neck: No JVD present. No tracheal deviation present.   Cardiovascular: Normal rate, regular rhythm and normal heart sounds.   Pulmonary/Chest: Effort normal and breath sounds normal. No stridor. No respiratory distress. She has no wheezes. She has no rales. She exhibits no tenderness.   Abdominal: Soft. Bowel " "sounds are normal. She exhibits no distension. There is no tenderness.   Musculoskeletal: She exhibits no edema or tenderness.   Neurological: She is alert. No cranial nerve deficit. She exhibits normal muscle tone. She displays seizure activity (twitching of extremities and left side of face, repeating \"Hi\" over and over).   Skin: Skin is warm and dry. She is not diaphoretic. No pallor.   Psychiatric: She has a normal mood and affect. Her behavior is normal.   Nursing note and vitals reviewed.      Fluids    Intake/Output Summary (Last 24 hours) at 10/3/2019 1922  Last data filed at 10/3/2019 1800  Gross per 24 hour   Intake 1000 ml   Output 5800 ml   Net -4800 ml       Laboratory  Recent Labs     10/02/19  1545 10/03/19  0518   WBC 4.9 3.7*   RBC 2.59* 2.61*   HEMOGLOBIN 8.2* 8.2*   HEMATOCRIT 25.0* 24.6*   MCV 96.5 94.3   MCH 31.7 31.4   MCHC 32.8* 33.3*   RDW 42.0 41.5   PLATELETCT 138* 132*   MPV 9.8 9.1     Recent Labs     10/02/19  1545 10/03/19  0518   SODIUM 133* 137   POTASSIUM 5.1 4.4   CHLORIDE 89* 92*   CO2 17* 23   GLUCOSE 94 73   BUN 78* 39*   CREATININE 15.20* 9.22*   CALCIUM 9.1 9.2                   Imaging  No orders to display        Assessment/Plan  * Acute metabolic encephalopathy  Assessment & Plan  Appears due to seizures and being off of seizure meds  Chart reviewed and found this has happened before.    Drug-seeking behavior- (present on admission)  Assessment & Plan  Avoid escalation in meds, currently not asking      ESRD (end stage renal disease) ([facility])- (present on admission)  Assessment & Plan  Missed dialysis monday. Nephrology will consult and emergently dialyze today and daily.   Dialysis today 10/3    Lupus (systemic lupus erythematosus) ([facility])- (present on admission)  Assessment & Plan  No flare. Continue meds.     Seizure disorder ([facility])  Assessment & Plan  Having what appears to be active partial seizures.  Resume vimpat  Ativan 2mg IV now to control twitching and repetetitive " behavior.  Prior EEG abnormal one month ago do not need to repeat    Opioid dependence (HCC)- (present on admission)  Assessment & Plan  Will not change home meds. Not asking for more         VTE prophylaxis: heparin

## 2019-10-04 NOTE — PROGRESS NOTES
Telemetry Shift Summary    Rhythm SR/ST  HR Range   Ectopy -  Measurements 0.16/0.08/0.36        Normal Values  Rhythm SR  HR Range    Measurements 0.12-0.20 / 0.06-0.10  / 0.30-0.52

## 2019-10-04 NOTE — PROGRESS NOTES
Pt A&Ox3, disoriented to event, pt reoriented. Pt helped up to the restroom, SBA with FWW. Pt reporting she does not usually wear O2 at home, took pt off O2, satting at 91%, will continue to monitor. Pt asking to use phone to call grandmother, pt showed how to use the phone. Call light left with in reach, bed alarm in use. No other needs at this time.

## 2019-10-04 NOTE — PROGRESS NOTES
"[facility] Medicine Daily Progress Note    Date of Service  10/4/2019    Chief Complaint  39 y.o. female admitted 10/2/2019 with missed dialysis    Hospital Course    History of Presenting Illness  39 y.o. female who presented 10/2/2019 with tremors, confusion and possible seizures at home. She is a dialysis patient and apparently missed dialysis on monday this week. She is unable to give a history due to confusion. Her grandmother says she saw twitching at home and then confusion. She has a known history of a seizure disorder and was unable to fill her vimpat on saturday. She had to fill it on monday. She says she had enough and did not miss doses but this is not clear.       Interval Problem Update  10/3: having twitching of extremities, twitching of left face, repeating words \"Hi\" over and over. Grandmother at bedside states patient missed doses of Vimpat, missed dialysis due to the above symptoms.  10/4: repetive behavior resolved, complaining of cramps during dialysis, raises her arm repeatedly despite being asked to stop as this is pulling on the dialysis lines. Saying oxycodone isn't helping her pain.    Consultants/Specialty  Nephrology    Code Status  full    Disposition  home    Review of Systems  Review of Systems   Unable to perform ROS: Acuity of condition        Physical Exam  Temp:  [36.7 °C (98 °F)-37.3 °C (99.1 °F)] 36.7 °C (98 °F)  Pulse:  [] 100  Resp:  [14-18] 17  BP: (116-173)/() 116/77  SpO2:  [93 %-97 %] 96 %    Physical Exam   Constitutional: She appears well-developed and well-nourished. No distress.   HENT:   Head: Normocephalic and atraumatic.   Right Ear: External ear normal.   Left Ear: External ear normal.   Nose: Nose normal.   Eyes: Conjunctivae are normal. Right eye exhibits no discharge. Left eye exhibits no discharge. No scleral icterus.   Neck: No JVD present. No tracheal deviation present.   Cardiovascular: Normal rate and regular rhythm.   Right upper extremity AV " fistula   Pulmonary/Chest: Effort normal. No stridor. No respiratory distress.   Abdominal: Soft. She exhibits no distension.   Musculoskeletal: She exhibits no edema or deformity.   Neurological: She is alert.   Skin: Skin is warm and dry. She is not diaphoretic.   Multiple scarred excoriations on arms, face and chest   Psychiatric: Thought content normal. Her affect is blunt and labile. Her speech is delayed. She is slowed. Cognition and memory are impaired. She expresses impulsivity and inappropriate judgment.   Nursing note and vitals reviewed.      Fluids    Intake/Output Summary (Last 24 hours) at 10/4/2019 1951  Last data filed at 10/4/2019 1525  Gross per 24 hour   Intake 500 ml   Output 3500 ml   Net -3000 ml       Laboratory  Recent Labs     10/02/19  1545 10/03/19  0518   WBC 4.9 3.7*   RBC 2.59* 2.61*   HEMOGLOBIN 8.2* 8.2*   HEMATOCRIT 25.0* 24.6*   MCV 96.5 94.3   MCH 31.7 31.4   MCHC 32.8* 33.3*   RDW 42.0 41.5   PLATELETCT 138* 132*   MPV 9.8 9.1     Recent Labs     10/02/19  1545 10/03/19  0518   SODIUM 133* 137   POTASSIUM 5.1 4.4   CHLORIDE 89* 92*   CO2 17* 23   GLUCOSE 94 73   BUN 78* 39*   CREATININE 15.20* 9.22*   CALCIUM 9.1 9.2                   Imaging  No orders to display        Assessment/Plan  * Acute metabolic encephalopathy  Assessment & Plan  Appears due to seizures and being off of seizure meds  Chart reviewed and found this has happened before.  Back on Vimpat and receiving dialysis, mental status is improving    Drug-seeking behavior- (present on admission)  Assessment & Plan  Avoid escalation in meds she is starting to beg for IV pain medication  Reassured and continued oral oxycodone only.      ESRD (end stage renal disease) ([facility])- (present on admission)  Assessment & Plan  Due to lupus nephritis  Nephrology dialyzing daily patient misses dialysis sessions frequently and is far behind in treatments.    Lupus (systemic lupus erythematosus) (HCC)- (present on  admission)  Assessment & Plan  No flare. Continue meds.       Seizure disorder (HCC)  Assessment & Plan  Seizure activity resolved  continue vimpat  Ativan 2mg IV prn to control twitching and repetetitive behavior.  Prior EEG abnormal one month ago do not need to repeat    Opioid dependence (HCC)- (present on admission)  Assessment & Plan  Will not change home meds.       VTE prophylaxis: heparin

## 2019-10-04 NOTE — PROGRESS NOTES
Bedside report received from Angelita FINCH. Assumed care. POC discussed. Pt lethargic, pt opens eyes to verbal stimuli. Safety precautions in place.

## 2019-10-04 NOTE — CARE PLAN
Problem: Safety  Goal: Will remain free from injury  Outcome: PROGRESSING AS EXPECTED  Note:   Pt call light and belongings with in reach, bed in locked and low position, treaded socks on, bed rails up x2, bed alarms in use, bed rails padded for seizure precautions.        Problem: Infection  Goal: Will remain free from infection  Outcome: PROGRESSING AS EXPECTED  Note:   Pt shows no s/s of infection, Pt is afebrile. Standard precautions in place, hand washing performed before and after pt care.

## 2019-10-04 NOTE — CARE PLAN
Problem: Safety  Goal: Will remain free from falls  Outcome: PROGRESSING AS EXPECTED  Note:   Patient calls appropriately. Wearing treaded slipper socks and hospital gown. Bed locked and in lowest position.     Problem: Venous Thromboembolism (VTW)/Deep Vein Thrombosis (DVT) Prevention:  Goal: Patient will participate in Venous Thrombosis (VTE)/Deep Vein Thrombosis (DVT)Prevention Measures  Note:   Patient ambulatory throughout the day

## 2019-10-04 NOTE — PROGRESS NOTES
Telemetry Shift Summary    Rhythm SR/ST  HR Range   Ectopy rPVC  Measurements 0.16/0.08/0.34        Normal Values  Rhythm SR  HR Range    Measurements 0.12-0.20 / 0.06-0.10  / 0.30-0.52

## 2019-10-04 NOTE — PROGRESS NOTES
Hemodialysis treatment ordered today per Dr Shaver x 3 hours. Treatment initiated at 1446, ended at 1746.     Patient tolerated tx, hypertensive during HD, primary RN notified, Pt stable throughout HD ; see paper flow sheet for details.     Net UF 2,800 mL.     Needles removed from access site. Dressings applied and sites held x 10 minutes; verified no bleeding. Positive bruit/thrill post tx. Staff RN to monitor AVF for breakthrough bleeding. Should breakthrough bleeding occur, staff RN to apply pressure to access sites until bleeding resolved. Notify Dialysis and Nephrologist for follow-up.    Report given to Primary RN.

## 2019-10-04 NOTE — PROGRESS NOTES
[facility] Progress Note      HD ordered by Dr. Shaver. Treatment started at 1225 and ended at 1525.     Total Net UF 3000mL.    Pt tolerated treatment well with no issues. See paper flow sheet for details. Cannulation needles taken out, held pressure for 10 min and placed gauze dressing with no bleeding. SHELBI AVG + for bruit/thrill. Report given to KALEY Vences RN.

## 2019-10-04 NOTE — PROGRESS NOTES
Assessment complete. Patient ambulated to bathroom, SBA. Steady on feet no complaints of dizziness. Patient denies any other needs a this time. Safety precautions in place. Call bell within reach.

## 2019-10-04 NOTE — PROGRESS NOTES
Assessment completed, pt lethargic, responds to verbal stimuli, able to squeeze hands. TRUONG orientation, pt does not respond to questions. Night medications held due to lethargy. Pt left to rest, safety precautions in place.

## 2019-10-04 NOTE — PROGRESS NOTES
Pt c/o 7/10 back pain, PRN oxy given as ordered. Pts B/P 173/100, PRN amlodipine given as ordered. No other needs at this time.

## 2019-10-04 NOTE — PROGRESS NOTES
Patient sitting up in bed receiving HD. Patient complains of 8/10 pain. Medication given. Safety precautions in place. Call bell is within reach.  Will continue to monitor.

## 2019-10-05 VITALS
HEART RATE: 87 BPM | OXYGEN SATURATION: 98 % | WEIGHT: 180.56 LBS | HEIGHT: 65 IN | BODY MASS INDEX: 30.08 KG/M2 | SYSTOLIC BLOOD PRESSURE: 116 MMHG | DIASTOLIC BLOOD PRESSURE: 72 MMHG | TEMPERATURE: 98.3 F | RESPIRATION RATE: 18 BRPM

## 2019-10-05 LAB
ANION GAP SERPL CALC-SCNC: 19 MMOL/L (ref 0–11.9)
BUN SERPL-MCNC: 28 MG/DL (ref 8–22)
CALCIUM SERPL-MCNC: 9.4 MG/DL (ref 8.4–10.2)
CHLORIDE SERPL-SCNC: 97 MMOL/L (ref 96–112)
CO2 SERPL-SCNC: 23 MMOL/L (ref 20–33)
CREAT SERPL-MCNC: 5.85 MG/DL (ref 0.5–1.4)
GLUCOSE SERPL-MCNC: 129 MG/DL (ref 65–99)
POTASSIUM SERPL-SCNC: 3.5 MMOL/L (ref 3.6–5.5)
SODIUM SERPL-SCNC: 139 MMOL/L (ref 135–145)

## 2019-10-05 PROCEDURE — 80048 BASIC METABOLIC PNL TOTAL CA: CPT

## 2019-10-05 PROCEDURE — A9270 NON-COVERED ITEM OR SERVICE: HCPCS | Performed by: HOSPITALIST

## 2019-10-05 PROCEDURE — 99239 HOSP IP/OBS DSCHRG MGMT >30: CPT | Performed by: HOSPITALIST

## 2019-10-05 PROCEDURE — 99232 SBSQ HOSP IP/OBS MODERATE 35: CPT | Performed by: INTERNAL MEDICINE

## 2019-10-05 PROCEDURE — 700101 HCHG RX REV CODE 250: Performed by: HOSPITALIST

## 2019-10-05 PROCEDURE — 700102 HCHG RX REV CODE 250 W/ 637 OVERRIDE(OP): Performed by: HOSPITALIST

## 2019-10-05 PROCEDURE — 700111 HCHG RX REV CODE 636 W/ 250 OVERRIDE (IP): Performed by: HOSPITALIST

## 2019-10-05 RX ORDER — SEVELAMER CARBONATE 800 MG/1
3200 TABLET, FILM COATED ORAL
Status: DISCONTINUED | OUTPATIENT
Start: 2019-10-05 | End: 2019-10-05 | Stop reason: HOSPADM

## 2019-10-05 RX ORDER — LACOSAMIDE 100 MG/1
100 TABLET ORAL 2 TIMES DAILY
Qty: 60 TAB | Refills: 0 | Status: SHIPPED | OUTPATIENT
Start: 2019-10-05 | End: 2019-11-04

## 2019-10-05 RX ORDER — SEVELAMER CARBONATE 800 MG/1
1600 TABLET, FILM COATED ORAL
Status: DISCONTINUED | OUTPATIENT
Start: 2019-10-05 | End: 2019-10-05 | Stop reason: HOSPADM

## 2019-10-05 RX ADMIN — FERROUS SULFATE TAB 325 MG (65 MG ELEMENTAL FE) 325 MG: 325 (65 FE) TAB at 05:41

## 2019-10-05 RX ADMIN — SEVELAMER CARBONATE 3200 MG: 800 TABLET, FILM COATED ORAL at 12:05

## 2019-10-05 RX ADMIN — HEPARIN 500 UNITS: 100 SYRINGE at 09:23

## 2019-10-05 RX ADMIN — SEVELAMER CARBONATE 3200 MG: 800 TABLET, FILM COATED ORAL at 08:50

## 2019-10-05 RX ADMIN — HEPARIN 500 UNITS: 100 SYRINGE at 06:43

## 2019-10-05 RX ADMIN — PROMETHAZINE HYDROCHLORIDE 25 MG: 25 TABLET ORAL at 08:50

## 2019-10-05 RX ADMIN — LACOSAMIDE 100 MG: 50 TABLET, FILM COATED ORAL at 05:41

## 2019-10-05 RX ADMIN — LIDOCAINE 1 PATCH: 50 PATCH TOPICAL at 05:41

## 2019-10-05 RX ADMIN — OMEPRAZOLE 20 MG: 20 CAPSULE, DELAYED RELEASE ORAL at 05:41

## 2019-10-05 RX ADMIN — OXYCODONE HYDROCHLORIDE 20 MG: 10 TABLET ORAL at 10:37

## 2019-10-05 RX ADMIN — PREGABALIN 75 MG: 25 CAPSULE ORAL at 12:04

## 2019-10-05 ASSESSMENT — ENCOUNTER SYMPTOMS
SHORTNESS OF BREATH: 0
ORTHOPNEA: 0
CHILLS: 0
COUGH: 0
MYALGIAS: 0
BACK PAIN: 1
FEVER: 0
PALPITATIONS: 0
VOMITING: 0
HEMOPTYSIS: 0
NAUSEA: 0
ABDOMINAL PAIN: 0
SINUS PAIN: 0
WEIGHT LOSS: 0
DIARRHEA: 0
EYES NEGATIVE: 1
WHEEZING: 0

## 2019-10-05 NOTE — CARE PLAN
Problem: Safety  Goal: Will remain free from injury  Outcome: PROGRESSING AS EXPECTED  No injury     Problem: Knowledge Deficit  Goal: Knowledge of disease process/condition, treatment plan, diagnostic tests, and medications will improve  Outcome: PROGRESSING AS EXPECTED  Understands POC     Problem: Discharge Barriers/Planning  Goal: Patient's continuum of care needs will be met  Outcome: PROGRESSING AS EXPECTED  No need for dialysis today and is discharging home     Problem: Pain Management  Goal: Pain level will decrease to patient's comfort goal  Outcome: PROGRESSING AS EXPECTED  Doing well with PRN oxycodone

## 2019-10-05 NOTE — DISCHARGE INSTRUCTIONS
Discharge Instructions per Martha Andrews M.D.    Follow up with Dr. Shaver to have old port removed.    DIET: regular    ACTIVITY: as tolerated    DIAGNOSIS: seizures due to being off seizure medication    Return to ER if you have more seizures, fevers, chills.  Discharge Instructions    Discharged to home by car with relative. Discharged via wheelchair, hospital escort: Yes.  Special equipment needed: Oxygen    Be sure to schedule a follow-up appointment with your primary care doctor or any specialists as instructed.     Discharge Plan:   Diet Plan: Discussed  Activity Level: Discussed  Confirmed Follow up Appointment: Appointment Scheduled  Confirmed Symptoms Management: Discussed  Medication Reconciliation Updated: Yes  Influenza Vaccine Indication: Patient Refuses    I understand that a diet low in cholesterol, fat, and sodium is recommended for good health. Unless I have been given specific instructions below for another diet, I accept this instruction as my diet prescription.   Other diet: Renal    Special Instructions: None    · Is patient discharged on Warfarin / Coumadin?   No     Seizure, Adult  When you have a seizure:  · Parts of your body may move.  · How aware or awake (conscious) you are may change.  · You may shake (convulse).  Some people have symptoms right before a seizure happens. These symptoms may include:  · Fear.  · Worry (anxiety).  · Feeling like you are going to throw up (nausea).  · Feeling like the room is spinning (vertigo).  · Feeling like you saw or heard something before (elizabet vu).  · Odd tastes or smells.  · Changes in vision, such as seeing flashing lights or spots.  Seizures usually last from 30 seconds to 2 minutes. Usually, they are not harmful unless they last a long time.  Follow these instructions at home:  Medicines  · Take over-the-counter and prescription medicines only as told by your doctor.  · Avoid anything that may keep your medicine from working, such as  alcohol.  Activity  · Do not do any activities that would be dangerous if you had another seizure, like driving or swimming. Wait until your doctor approves.  · If you live in the [place] ask your local DMV (department of [employer]) when you can drive.  · Rest.  Teaching others  · Teach friends and family what to do when you have a seizure. They should:  ¨ Lay you on the ground.  ¨ Protect your head and body.  ¨ Loosen any tight clothing around your neck.  ¨ Turn you on your side.  ¨ Stay with you until you are better.  ¨ Not hold you down.  ¨ Not put anything in your mouth.  ¨ Know whether or not you need emergency care.  General instructions  · Contact your doctor each time you have a seizure.  · Avoid anything that gives you seizures.  · Keep a seizure diary. Write down:  ¨ What you think caused each seizure.  ¨ What you remember about each seizure.  · Keep all follow-up visits as told by your doctor. This is important.  Contact a doctor if:  · You have another seizure.  · You have seizures more often.  · There is any change in what happens during your seizures.  · You continue to have seizures with treatment.  · You have symptoms of being sick or having an infection.  Get help right away if:  · You have a seizure:  ¨ That lasts longer than 5 minutes.  ¨ That is different than seizures you had before.  ¨ That makes it harder to breathe.  ¨ After you hurt your head.  · After a seizure, you cannot speak or use a part of your body.  · After a seizure, you are confused or have a bad headache.  · You have two or more seizures in a row.  · You are having seizures more often.  · You do not wake up right after a seizure.  · You get hurt during a seizure.  In an emergency:  · These symptoms may be an emergency. Do not wait to see if the symptoms will go away. Get medical help right away. Call your local emergency services (911 in the [place] Do not drive yourself to the hospital.  This information is not intended to  replace advice given to you by your health care provider. Make sure you discuss any questions you have with your health care provider.  Document Released: 06/05/2009 Document Revised: 08/30/2017 Document Reviewed: 08/30/2017  [employer] Patient Education © 2017 [employer]      Depression / Suicide Risk    As you are discharged from this [facility] facility, it is important to learn how to keep safe from harming yourself.    Recognize the warning signs:  · Abrupt changes in personality, positive or negative- including increase in energy   · Giving away possessions  · Change in eating patterns- significant weight changes-  positive or negative  · Change in sleeping patterns- unable to sleep or sleeping all the time   · Unwillingness or inability to communicate  · Depression  · Unusual sadness, discouragement and loneliness  · Talk of wanting to die  · Neglect of personal appearance   · Rebelliousness- reckless behavior  · Withdrawal from people/activities they love  · Confusion- inability to concentrate     If you or a loved one observes any of these behaviors or has concerns about self-harm, here's what you can do:  · Talk about it- your feelings and reasons for harming yourself  · Remove any means that you might use to hurt yourself (examples: pills, rope, extension cords, firearm)  · Get professional help from the community (Mental Health, Substance Abuse, psychological counseling)  · Do not be alone:Call your Safe Contact- someone whom you trust who will be there for you.  · Call your local CRISIS HOTLINE 734-5038 or 015-665-0659  · Call your local Children's Mobile Crisis Response Team [place] (001) 447-4334 or www.[employer]  · Call the toll free National Suicide Prevention Hotlines   · National Suicide Prevention Lifeline 564-201-VCCT (7221)  · National Hope Line Network 800-SUICIDE (549-1102)

## 2019-10-05 NOTE — PROGRESS NOTES
This RN confirmed with Dr. Martha Andrews that this patient is not due for dialysis today, she is to discharge home and resume her MWF dialysis schedule as per before hospitalization. Patient is aware and agrees with this plan

## 2019-10-05 NOTE — PROGRESS NOTES
Telemetry Shift Summary    Rhythm SR ST  HR Range   Ectopy none  Measurements 0.14/0.08/0.34        Normal Values  Rhythm SR  HR Range    Measurements 0.12-0.20 / 0.06-0.10  / 0.30-0.52

## 2019-10-05 NOTE — PROGRESS NOTES
Pt resting in bed. No complaints at this time. Bed low/locked/alarmed. Call light within reach. WIll continue to monitor.

## 2019-10-05 NOTE — CARE PLAN
Problem: Communication  Goal: The ability to communicate needs accurately and effectively will improve  Outcome: PROGRESSING AS EXPECTED     Problem: Safety  Goal: Will remain free from injury  Outcome: PROGRESSING AS EXPECTED  Goal: Will remain free from falls  Outcome: PROGRESSING AS EXPECTED  Pt aware of POC. Pt understands the need for effective communication and  safety measures and effectively demonstrates by voicing feelings/needs/thoughts, wearing treaded socks, using call light appropriately

## 2019-10-05 NOTE — PROGRESS NOTES
Nephrology Daily Progress Note    Date of Service  10/5/2019    Chief Complaint  39 y.o. Female with ESRD/HD, admitted 10/2/2019 with AMS, missed dialysis    Interval Problem Update  10/3 - somnolent, following commends  Received dialysis last night  Plan to continue daily HD for now  10/04  Feels much better  No acute events  No seizures activity  AAO  Scheduled for dialysis today  10/5  Doing well  C/o chronic back pain/joint pain  No seizure activity  HD MWF    Review of Systems  Review of Systems   Constitutional: Negative for chills, fever, malaise/fatigue and weight loss.   HENT: Negative for congestion, hearing loss and sinus pain.    Eyes: Negative.    Respiratory: Negative for cough, hemoptysis, shortness of breath and wheezing.    Cardiovascular: Negative for chest pain, palpitations, orthopnea and leg swelling.   Gastrointestinal: Negative for abdominal pain, diarrhea, nausea and vomiting.   Musculoskeletal: Positive for back pain and joint pain. Negative for myalgias.   Skin: Negative.    All other systems reviewed and are negative.       Physical Exam  Temp:  [36.3 °C (97.3 °F)-37.3 °C (99.1 °F)] 36.9 °C (98.5 °F)  Pulse:  [] 87  Resp:  [16-18] 18  BP: ()/(42-81) 111/68  SpO2:  [93 %-100 %] 97 %    Physical Exam   Constitutional: She is oriented to person, place, and time. She appears well-developed and well-nourished. No distress.   HENT:   Head: Normocephalic and atraumatic.   Nose: Nose normal.   Mouth/Throat: Oropharynx is clear and moist.   Eyes: Pupils are equal, round, and reactive to light. Conjunctivae and EOM are normal.   Neck: Normal range of motion. Neck supple. No thyromegaly present.   Cardiovascular: Normal rate and regular rhythm. Exam reveals no gallop and no friction rub.   Pulmonary/Chest: Effort normal and breath sounds normal. No respiratory distress. She has no wheezes. She has no rales.   Abdominal: Soft. Bowel sounds are normal. She exhibits no distension and no  mass. There is no tenderness. There is no guarding.   Musculoskeletal: She exhibits no edema.   Neurological: She is alert and oriented to person, place, and time. No cranial nerve deficit.   Skin: Skin is warm. No rash noted. No erythema.   Nursing note and vitals reviewed.      Fluids    Intake/Output Summary (Last 24 hours) at 10/5/2019 1106  Last data filed at 10/5/2019 0800  Gross per 24 hour   Intake 800 ml   Output 3500 ml   Net -2700 ml       Laboratory  Recent Labs     10/02/19  1545 10/03/19  0518   WBC 4.9 3.7*   RBC 2.59* 2.61*   HEMOGLOBIN 8.2* 8.2*   HEMATOCRIT 25.0* 24.6*   MCV 96.5 94.3   MCH 31.7 31.4   MCHC 32.8* 33.3*   RDW 42.0 41.5   PLATELETCT 138* 132*   MPV 9.8 9.1     Recent Labs     10/02/19  1545 10/03/19  0518 10/05/19  0928   SODIUM 133* 137 139   POTASSIUM 5.1 4.4 3.5*   CHLORIDE 89* 92* 97   CO2 17* 23 23   GLUCOSE 94 73 129*   BUN 78* 39* 28*   CREATININE 15.20* 9.22* 5.85*   CALCIUM 9.1 9.2 9.4         No results for input(s): NTPROBNP in the last 72 hours.        Imaging  reviewed    Assessment/Plan  1.ESRD/HD - on MWF  2.HTN: elevated BP - well controlled now  3.Electrolytes: well controlled.  4.Anemia: low Hb level -Epogen with HD MWF  5.Metabolic acidosis corrected with dialysis  6.Volume: overloaded -UF with HD as tolerates    Recs: HD MWF            To monitor Hb, BMP             Renal diet             Epogen with HD MWF             All meds to renal doses

## 2019-10-05 NOTE — PROGRESS NOTES
Telemetry Shift Summary     Rhythm SR  HR Range 70-98  Ectopy none  Measurements 0.14/0.08/0.34           Normal Values  Rhythm SR  HR Range    Measurements 0.12-0.20 / 0.06-0.10  / 0.30-0.52

## 2019-10-05 NOTE — PROGRESS NOTES
Telemetry Strip     Strip printed: 0705  Measurements from am strip were as follows:  Rhythm: ST  HR:108  Measurements: 0.16/0.08/0.32        Telemetry Shift Summary:    Rhythm:ST  HR: 101-116  Ectopy:None        Normal Values  Rhythm SR  HR Range    Measurements 0.12-0.20 / 0.06-0.10  / 0.30-0.52

## 2019-10-05 NOTE — PROGRESS NOTES
Report given to JOSETTE Nieves. Pt resting comfortably in bed. Declines any needs at this time. Call light within reach. Bed low/locked. Bed alarm on.

## 2019-10-05 NOTE — DISCHARGE SUMMARY
Discharge Summary    CHIEF COMPLAINT ON ADMISSION  Chief Complaint   Patient presents with   • Seizure     Pt states she was supposed to have dialysis Monday, but missed it; has dialysis MWF; has been off her seizure medications for the last several days; pt states she has had several seizures; pt with tremors   • Altered Mental Status     BIB grandmother who was going to take her to dialysis today; pt acting confused per grandmother and self       Reason for Admission  Mark     Admission Date  10/2/2019    CODE STATUS  Full Code    HPI & HOSPITAL COURSE    History of Presenting Illness  39 y.o. female who presented 10/2/2019 with tremors, confusion and possible seizures at home. She is a dialysis patient and apparently missed dialysis on monday this week. She is unable to give a history due to confusion. Her grandmother says she saw twitching at home and then confusion. She has a known history of a seizure disorder and was unable to fill her vimpat on saturday. She had to fill it on monday. She says she had enough and did not miss doses but this is not clear.      HOSPITAL COURSE: After admit she was noted to be having rhythmic twitching of the limbs, lip smacking and repetitive speech. This resolved after treatment with ativan and resuming her Vimpat. Dialysis was performed daily for three days with improvement in her fluid balance, edema and mental status. She is confirmed to have filled her prescription for Vimpat and has a full supply at home. Of note, patient has a dialysis port that is no longer in use and needs to be removed; I discussed with Dr. Shaver and she will arrange for the port removal as an outpatient.    Therefore, she is discharged in good and stable condition to home with close outpatient follow-up.    The patient met 2-midnight criteria for an inpatient stay at the time of discharge.    Discharge Date  10/5/19    FOLLOW UP ITEMS POST DISCHARGE  Dialysis  Follow up with Dr. Shaver she will get an  appointment to have the old port removed.    DISCHARGE DIAGNOSES  Principal Problem:    Acute metabolic encephalopathy POA: Unknown  Active Problems:    Drug-seeking behavior (Chronic) POA: Yes    Lupus (systemic lupus erythematosus) (HCC) POA: Yes    ESRD (end stage renal disease) (HCC) (Chronic) POA: Yes    Seizure disorder (HCC) POA: Unknown    Opioid dependence (HCC) POA: Yes  Resolved Problems:    * No resolved hospital problems. *      FOLLOW UP  Future Appointments   Date Time Provider Department Center   10/8/2019 11:00 AM Sushila Manzano M.D. [id number]   10/24/2019  9:10 AM Quinn Chandler M.D. [facility] None     [facility]  [phone]    This is the ride service provided by Medicaid that you can use to get to dialysis. You must call them and schedule your rides.      MEDICATIONS ON DISCHARGE     Medication List      CONTINUE taking these medications      Instructions   amLODIPine 10 MG Tabs  Commonly known as:  NORVASC   Take 1 Tab by mouth 1 time daily as needed (If blood pressure is > 170).  Dose:  10 mg     ascorbic acid 500 MG Tabs  Commonly known as:  ascorbic acid   Take 500 mg by mouth every day.  Dose:  500 mg     docusate sodium 100 MG Caps  Commonly known as:  COLACE   Take 100 mg by mouth every day.  Dose:  100 mg     ferrous sulfate 325 (65 Fe) MG tablet   Take 325 mg by mouth every day.  Dose:  325 mg     hydroxychloroquine 200 MG Tabs  Commonly known as:  PLAQUENIL   Take 200 mg by mouth every bedtime.  Dose:  200 mg     lacosamide 100 MG Tabs tablet  Commonly known as:  VIMPAT   Take 1 Tab by mouth 2 Times a Day for 30 days.  Dose:  100 mg     lidocaine 5 % Ptch  Commonly known as:  LIDODERM   Apply 1 Patch to skin as directed as needed (For back pain). On for 12 hours off for 12 hours  Dose:  1 Patch     omeprazole 20 MG delayed-release capsule  Commonly known as:  PRILOSEC   Take 1 Cap by mouth every day.  Dose:  20 mg     Oxycodone HCl 20 MG Tabs   Take 1 Tab by mouth 4 times a day  for 28 days.  Dose:  20 mg     pregabalin 75 MG Caps  Commonly known as:  LYRICA   Take 1 Cap by mouth 3 times a day for 30 days.  Dose:  75 mg     * promethazine 25 MG Tabs  Commonly known as:  PHENERGAN   TAKE 1 TABLET BY MOUTH EVERY 8 HOURS AS NEEDED FOR NAUSEA OR VOMITING     * promethazine 25 MG Tabs  Commonly known as:  PHENERGAN   TAKE 1 TABLET BY MOUTH EVERY 8 HOURS AS NEEDED FOR NAUSEA OR VOMITING     RENVELA 800 MG Tabs tablet  Generic drug:  sevelamer carbonate   Take 1,600-3,200 mg by mouth See Admin Instructions. 1,600 mg with snacks   3,200 mg with meals  Dose:  1,600-3,200 mg     tizanidine 4 MG Tabs  Commonly known as:  ZANAFLEX   Take 8 mg by mouth every bedtime.  Dose:  8 mg     traZODone 50 MG Tabs  Commonly known as:  DESYREL   Take 1 Tab by mouth every bedtime.  Dose:  50 mg     valACYclovir 500 MG Tabs  Commonly known as:  VALTREX   Take 1 Tab by mouth 2 times a day as needed (cold sores).  Dose:  500 mg     Vitamin D3 76724 UNIT Caps   Take 10,000 Units by mouth every day.  Dose:  10,000 Units         * This list has 2 medication(s) that are the same as other medications prescribed for you. Read the directions carefully, and ask your doctor or other care provider to review them with you.                Allergies  Allergies   Allergen Reactions   • Lorazepam [Ativan] Anxiety     Patient becomes severely paranoid and agitated   • Morphine Itching     Tolerates Dilaudid   • Seasonal Runny Nose and Itching     Hay fever, sabiha brush       DIET  Orders Placed This Encounter   Procedures   • Diet Order Renal     Standing Status:   Standing     Number of Occurrences:   1     Order Specific Question:   Diet:     Answer:   Renal [8]       ACTIVITY  As tolerated.  Weight bearing as tolerated    CONSULTATIONS  Nephrology, Dr. Shaver    PROCEDURES  None    LABORATORY  Lab Results   Component Value Date    SODIUM 139 10/05/2019    POTASSIUM 3.5 (L) 10/05/2019    CHLORIDE 97 10/05/2019    CO2 23 10/05/2019     GLUCOSE 129 (H) 10/05/2019    BUN 28 (H) 10/05/2019    CREATININE 5.85 (HH) 10/05/2019    CREATININE 0.9 12/13/2008        Lab Results   Component Value Date    WBC 3.7 (L) 10/03/2019    HEMOGLOBIN 8.2 (L) 10/03/2019    HEMATOCRIT 24.6 (L) 10/03/2019    PLATELETCT 132 (L) 10/03/2019        Total time of the discharge process exceeds 35 minutes.

## 2019-10-05 NOTE — PROGRESS NOTES
2140 Pt more lethargic, falling asleep mid sentence, vitals taken. Pt hypotensive, SBP in the 70s. Paged Dr. Andrews 1L bolus ordered.   2157 IV bolus started  2315 IV bolus stopped, SBP still in 70s. Pt still able to answer orientation questions.  called updated, said if she is not dizzy and HR is still WNL to just let pt sleep. Most likely due to Dialysis and oral opiates. Will continue to monitor.

## 2019-10-05 NOTE — PROGRESS NOTES
Report received from JOSETTE Jamison. Pt resting comfortably in bed. Declines any needs at this time. Call light within reach, bed low/locked, bed alarm on. Will continue to monitor.

## 2019-10-07 ENCOUNTER — PATIENT OUTREACH (OUTPATIENT)
Dept: HEALTH INFORMATION MANAGEMENT | Facility: OTHER | Age: 39
End: 2019-10-07

## 2019-10-07 NOTE — PROGRESS NOTES
10/7/19 3:45pm:  CM post discharge outreach call first attempt. VM left requesting return call to  at [phone].    10/8/19 8:45am: CM post discharge outreach call second attempt.  VM left requesting a return call to  at [phone].

## 2019-10-24 ENCOUNTER — OFFICE VISIT (OUTPATIENT)
Dept: PHYSICAL MEDICINE AND REHAB | Facility: MEDICAL CENTER | Age: 39
End: 2019-10-24
Payer: MEDICARE

## 2019-10-24 VITALS
OXYGEN SATURATION: 96 % | TEMPERATURE: 98.8 F | SYSTOLIC BLOOD PRESSURE: 130 MMHG | WEIGHT: 181 LBS | BODY MASS INDEX: 30.16 KG/M2 | HEART RATE: 99 BPM | DIASTOLIC BLOOD PRESSURE: 86 MMHG | HEIGHT: 65 IN

## 2019-10-24 DIAGNOSIS — M87.052 AVASCULAR NECROSIS OF BONE OF HIP, LEFT (HCC): ICD-10-CM

## 2019-10-24 DIAGNOSIS — G62.9 PERIPHERAL POLYNEUROPATHY: ICD-10-CM

## 2019-10-24 DIAGNOSIS — M62.838 MUSCLE SPASM: ICD-10-CM

## 2019-10-24 DIAGNOSIS — Z99.2 ESRD (END STAGE RENAL DISEASE) ON DIALYSIS (HCC): ICD-10-CM

## 2019-10-24 DIAGNOSIS — G89.29 CHRONIC BILATERAL LOW BACK PAIN WITHOUT SCIATICA: ICD-10-CM

## 2019-10-24 DIAGNOSIS — N18.6 ESRD (END STAGE RENAL DISEASE) ON DIALYSIS (HCC): ICD-10-CM

## 2019-10-24 DIAGNOSIS — M54.50 CHRONIC BILATERAL LOW BACK PAIN WITHOUT SCIATICA: ICD-10-CM

## 2019-10-24 DIAGNOSIS — Z96.641 STATUS POST TOTAL REPLACEMENT OF RIGHT HIP: ICD-10-CM

## 2019-10-24 DIAGNOSIS — F11.90 CHRONIC, CONTINUOUS USE OF OPIOIDS: ICD-10-CM

## 2019-10-24 DIAGNOSIS — M79.10 MYALGIA: ICD-10-CM

## 2019-10-24 PROCEDURE — 99214 OFFICE O/P EST MOD 30 MIN: CPT | Performed by: PHYSICAL MEDICINE & REHABILITATION

## 2019-10-24 RX ORDER — PREGABALIN 75 MG/1
75 CAPSULE ORAL 3 TIMES DAILY
Qty: 90 CAP | Refills: 0 | Status: SHIPPED | OUTPATIENT
Start: 2019-11-11 | End: 2019-11-21 | Stop reason: SDUPTHER

## 2019-10-24 RX ORDER — OXYCODONE HYDROCHLORIDE 20 MG/1
20 TABLET ORAL 4 TIMES DAILY PRN
Qty: 108 TAB | Refills: 0 | Status: SHIPPED | OUTPATIENT
Start: 2019-10-24 | End: 2019-11-21 | Stop reason: SDUPTHER

## 2019-10-24 ASSESSMENT — PATIENT HEALTH QUESTIONNAIRE - PHQ9: CLINICAL INTERPRETATION OF PHQ2 SCORE: 0

## 2019-10-24 ASSESSMENT — PAIN SCALES - GENERAL: PAINLEVEL: 8=MODERATE-SEVERE PAIN

## 2019-10-24 NOTE — PROGRESS NOTES
Follow up patient note  Pain Medicine, Interventional spine and sports physiatry, Physical medicine rehabilitation      Chief complaint:   Diffuse joint and body pain, low back, hips and legs      HISTORY      HPI  Patient identification/Interval histotry:     Debby Carrizales 39 y.o. female who has chronic pain related to rheumatologic disease, peripheral neuropathy and AVN hips, lupus.       Since her last visit, she reports that she is going to need to have her venous catheter adjusted.  It has been pushed through the tricuspid valve.  This needs to be replaced, but this has not happened yet.    Recently, she had some trouble with seizures.  She is back on her medications now, there was a lapse in her Vimpat prescription.    Dr. Holman will be evaluating her for left total hip replacement second to AVN.  Dr. Collado has suggested the possibility of a hip injection rather than hip replacement.    Pain continues to be about the same, but her back has been more painful.  Ice has helped.  Pain also in the left hip worse with walking.  Pain is a 7/10 on the VAS.  Oxycodone helps with her pain.  No significant constipation, she is taking colace and miralax.    Lyrica helps with burning pain. This is stable at current dose.  No side effects.     She is/was(?) on the [facility] transplant list, but needs to reapply and meet with her .       ORT: 3  PHQ-9:0    Review of records:   Hospital discharge report noted from 08/12/2019-08/21/2019 admission.  She was admitted for a near syncopal episode.  Cardiac evaluation was suspicious for endocarditis and she was started on empiric IV abx.  GAYATHRI demonstrate no endocarditis, but did show that her venous catheter was pushing through the tricuspid valve.  Cultures were negative.  Dr. Jansen, vascular surgery, was consulted and they discussed follow-up plans for port management and this will be planned after discharge.    ROS   Unchanged from 09/19/2019 except as  noted in HPI     Red Flags :   Chills, Sweats:No fevers, chills and night sweats.  She does report some low-grade fevers, unchanged  Involuntary Weight Loss: Denies  Bowel/Bladder Incontinence: Denies  Saddle Anesthesia: Denies    PMHx:   Past Medical History:   Diagnosis Date   • Arthritis     all joints,r/t lupus   • Avascular necrosis of bones of both hips ([facility]) 10/10/2016   • Clostridium difficile colitis 5/3/2011   • Dialysis patient    • ESBL (extended spectrum beta-lactamase) producing bacteria infection 8/25/2014   • Fibromyalgia    • Hypertension    • Lupus ([facility])    • Pneumonia    • Psychiatric disorder     anxiety, depression   • Pyelonephritis 11/21/2017   • Renal failure    • Sepsis due to pneumonia ([facility]) 6/7/2018   • Status epilepticus ([facility]) 12/13/2018       PSHx:   Past Surgical History:   Procedure Laterality Date   • GAYATHRI N/A 8/20/2019    Procedure: ECHOCARDIOGRAM, TRANSESOPHAGEAL;  Surgeon: Marta Elaine M.D.;  Location: [facility];  Service: Cardiac   • AV FISTULA REVISION Right 8/11/2018    Procedure: AV FISTULA REVISION- Graft and Thrombectomy;  Surgeon: Shabbir Ardon M.D.;  Location: [facility];  Service: General   • AV FISTULA THROMBOLYSIS Right 8/11/2018    Procedure: AV FISTULA THROMBOLYSIS;  Surgeon: Shabbir Ardon M.D.;  Location: [facility];  Service: General   • AV FISTULA CREATION Right 12/9/2017    Procedure: AV FISTULA CREATION-ARM FOR GRAFT;  Surgeon: Lesly Jansen M.D.;  Location: [facility];  Service: General   • THROMBECTOMY  12/9/2017    Procedure: THROMBECTOMY;  Surgeon: Lesly Jansen M.D.;  Location: [facility];  Service: General   • THROMBECTOMY Right 8/21/2016    Procedure: THROMBECTOMY - right AV fistula graft with grams;  Surgeon: Shabbir Ardon M.D.;  Location: [facility];  Service:    • ANGIOPLASTY BALLOON  8/21/2016    Procedure: ANGIOPLASTY BALLOON;  Surgeon:  Shabbir Ardon M.D.;  Location: SURGERY [facility];  Service:    • THROMBECTOMY Right 8/20/2016    Procedure: THROMBECTOMY AV GRAFT;  Surgeon: Shabbir Ardon M.D.;  Location: SURGERY [facility];  Service:    • AV FISTULA CREATION Right 7/12/2016    Procedure: AV FISTULA CREATION WITH GRAFT BRACHIAL AXILLARY;  Surgeon: Shabbir Ardon M.D.;  Location: SURGERY [facility];  Service:    • CLOSED REDUCTION Right 7/5/2016    Procedure: CLOSED REDUCTION- Hip ;  Surgeon: Michael Holman M.D.;  Location: SURGERY [facility];  Service:    • HIP ARTHROPLASTY TOTAL Right 1/18/2016    Procedure: HIP ARTHROPLASTY TOTAL;  Surgeon: Michael Holman M.D.;  Location: [facility];  Service:    • AV FISTULA CREATION  2/3/2015    Performed by Shabbir Ardon M.D. at [facility]   • AV FISTULA CREATION  11/14/2014    Performed by Shabbir Ardon M.D. at [facility]   • AV FISTULA CREATION  9/9/2014    Performed by Shabbir Ardon M.D. at [facility]   • CATH PLACEMENT  9/9/2014    Performed by Shabbir Ardon M.D. at [facility]   • OTHER  5/2011    tracheostomy   • GASTROSCOPY-ENDO  4/27/2011    Performed by PALMIRA DOAN at [facility]   • COLONOSCOPY WITH BIOPSY  4/20/2011    Performed by ALCIRA CRUZ at [facility]   • GASTROSCOPY-ENDO  4/18/2011    Performed by ALCIRA CRUZ at [facility]   • GASTROSCOPY-ENDO  4/10/2011    Performed by SYED JURADO at [facility]   • SCLEROTHERAPHY  4/10/2011    Performed by SYED JURADO at [facility]   • OTHER ABDOMINAL SURGERY      kidney biopsy   • TRACHEOSTOMY         Family history   Denies neuromuscular disease  Family History   Problem Relation Age of Onset   • Heart Disease Mother    • Cancer Father        Medications:   Outpatient Medications Marked as Taking for the 10/24/19 encounter (Office  Visit) with Quinn Chandler M.D.   Medication Sig Dispense Refill   • [START ON 11/11/2019] pregabalin (LYRICA) 75 MG Cap Take 1 Cap by mouth 3 times a day for 30 days. 90 Cap 0   • Oxycodone HCl 20 MG Tab Take 1 Tab by mouth 4 times a day as needed for up to 28 days. 108 Tab 0   • lacosamide (VIMPAT) 100 MG Tab tablet Take 1 Tab by mouth 2 Times a Day for 30 days. 60 Tab 0   • promethazine (PHENERGAN) 25 MG Tab TAKE 1 TABLET BY MOUTH EVERY 8 HOURS AS NEEDED FOR NAUSEA OR VOMITING 60 Tab 1   • amLODIPine (NORVASC) 10 MG Tab Take 1 Tab by mouth 1 time daily as needed (If blood pressure is > 170). 90 Tab 1   • valACYclovir (VALTREX) 500 MG Tab Take 1 Tab by mouth 2 times a day as needed (cold sores). 60 Tab 1   • Cholecalciferol (VITAMIN D3) 53445 UNIT Cap Take 10,000 Units by mouth every day.     • lidocaine (LIDODERM) 5 % Patch Apply 1 Patch to skin as directed as needed (For back pain). On for 12 hours off for 12 hours      • docusate sodium (COLACE) 100 MG Cap Take 100 mg by mouth every day.     • tizanidine (ZANAFLEX) 4 MG Tab Take 8 mg by mouth every bedtime.     • traZODone (DESYREL) 50 MG Tab Take 1 Tab by mouth every bedtime. 90 Tab 3   • omeprazole (PRILOSEC) 20 MG delayed-release capsule Take 1 Cap by mouth every day. 30 Cap 3   • sevelamer carbonate (RENVELA) 800 MG Tab tablet Take 1,600-3,200 mg by mouth See Admin Instructions. 1,600 mg with snacks   3,200 mg with meals     • ferrous sulfate 325 (65 FE) MG tablet Take 325 mg by mouth every day.     • ascorbic acid (ASCORBIC ACID) 500 MG Tab Take 500 mg by mouth every day.     • hydroxychloroquine (PLAQUENIL) 200 MG Tab Take 200 mg by mouth every bedtime.         Allergies:   Allergies   Allergen Reactions   • Lorazepam [Ativan] Anxiety     Patient becomes severely paranoid and agitated   • Morphine Itching     Tolerates Dilaudid   • Seasonal Runny Nose and Itching     Hay fever, sabiha brush       Social Hx:   Social History     Socioeconomic History   •  Marital status: Single     Spouse name: Not on file   • Number of children: Not on file   • Years of education: Not on file   • Highest education level: Not on file   Occupational History   • Not on file   Social Needs   • Financial resource strain: Not on file   • Food insecurity:     Worry: Not on file     Inability: Not on file   • Transportation needs:     Medical: Not on file     Non-medical: Not on file   Tobacco Use   • Smoking status: Former Smoker     Packs/day: 0.50     Years: 18.00     Pack years: 9.00     Types: Cigarettes     Start date:      Last attempt to quit: 2011     Years since quittin.4   • Smokeless tobacco: Never Used   • Tobacco comment: started at 15   Substance and Sexual Activity   • Alcohol use: No     Alcohol/week: 0.0 oz     Frequency: Never     Binge frequency: Never   • Drug use: No     Types: Marijuana   • Sexual activity: Not Currently     Partners: Male   Lifestyle   • Physical activity:     Days per week: Not on file     Minutes per session: Not on file   • Stress: Not on file   Relationships   • Social connections:     Talks on phone: Not on file     Gets together: Not on file     Attends Shinto service: Not on file     Active member of club or organization: Not on file     Attends meetings of clubs or organizations: Not on file     Relationship status: Not on file   • Intimate partner violence:     Fear of current or ex partner: Not on file     Emotionally abused: Not on file     Physically abused: Not on file     Forced sexual activity: Not on file   Other Topics Concern   •  Service No   • Blood Transfusions Yes   • Caffeine Concern No   • Occupational Exposure No   • Hobby Hazards No   • Sleep Concern Yes   • Stress Concern Yes   • Weight Concern Yes   • Special Diet Yes   • Back Care No   • Exercise Yes   • Bike Helmet No   • Seat Belt Yes   • Self-Exams Yes   Social History Narrative   • Not on file       EXAMINATION     Physical Exam:   Vitals: BP  "130/86 (BP Location: Left arm, Patient Position: Sitting, BP Cuff Size: Large adult long)   Pulse 99   Temp 37.1 °C (98.8 °F) (Temporal)   Ht 1.651 m (5' 5\")   Wt 82.1 kg (181 lb)   SpO2 96%     Constitutional:   Body Habitus: Body mass index is 30.12 kg/m².  Cooperation: Fully cooperates with exam  Appearance: Well-groomed no disheveled, in no acute distress  Respiratory-  breathing comfortable on room air, no wheezing.  Psychiatric- alert and oriented ×3. Normal affect.   Spine:   No focal motor deficits in the lower extremities bilaterally.  Sensation is grossly intact to light touch in the lower extremities bilaterally  Reflexes 2+ patella and 1+ achilles bilaterally  Gait slow, steady without loss of balance.  No use of assist device.   Pain with ROM of the left hip.  Doroteo's is positive for anterior groin pain on the left       MEDICAL DECISION MAKING    DATA    Labs:     UDS:   08/22/2019 Oxycodone and metabolites (patient does not have a script for phenergan with codeine and this was negative) Therefore, consistent with medications  06/13/2019 Negative for oxycodone, positive for other oxycodone metabolites  04/16/2019 Positive for oxymorphone  01/17/2019 Oxycodone and metabolites as expected  10/02/2018 Oxycodone and metabolites, also fentanyl    01/09/2019: Hep B surface ag negative  01/08/2019: GFR 7; BUN 23 Cr 6.68, Plt 160    12/15/2018 CRP 3.03    GFR 08/21/2019, 4  08/21/2019 WBC 4.6, Hb 10.1, Hct 31.6, Plt 156    BMP 12/16/2018  Na 136, Potassium 4.4, chloride 96, CO2 23 Glucose 83, BUN 55H, Cr 9.44H, Calcium 9.9, Anion gap 17.0H    CBC 3.3, Hb 9.6, Hct 31.3, Plt 115    Lab Results   Component Value Date/Time    HBA1C 4.9 06/07/2018 02:29 AM        Imaging:   I reviewed the following radiology reports reviewed  GAYATHRI 08/20/2019  Echocardiography Laboratory  CONCLUSIONS  LV EF    65%.  Structurally normal tricuspid valve.  Trace tricuspid regurgitation.  Port catheter noted in the SVC and right " atrium.  Tip support appears bright, no obvious vegetation.  The tip of the port abuts the tricuspid valve.  Recommend the port be pulled back approximately 2 cm.  No evidence of endocarditis.    Xray hip with pelvis-left 07/28/2019  Left femoral head heterogeneous density is compatible with known diagnosis of avascular necrosis. There is no joint space narrowing or spurring    Right arthroplasty without evident complication.    Xray left foot 01/21/2019  Nondisplaced transverse fracture through the base of the fifth metatarsal.    MRI lumbar spine 1/1/19  1.  Diffusely decreased signal again seen throughout the marrow space consistent with red marrow conversion, likely secondary to chronic anemia.  2.  No significant disc bulging, central canal narrowing, or foraminal narrowing.  3.  No lumbar spine fracture or subluxation.    MRI brain 12/13/2018  1.  There is no hippocampal sclerosis.  2.  There is no evidence of cortical dysplasia or neuronal migrational abnormalities.  3.  A few nonspecific T2 hyperintensities in the supratentorial white matter likely representing nonspecific foci of gliosis, chronic ischemia and demyelination. This is unchanged since the previous MRI dated 2/23/2012.       Chest xray 06/07/2018: Minimal right-sided opacities as described above, suggesting pneumonia.               Results for orders placed during the hospital encounter of 07/19/17   MR-LUMBAR SPINE-W/O    Impression 1.  Diffuse decreased bone marrow signal intensity throughout the lumbar spine and sacrum, presumably secondary to chronic anemia.    2.  Otherwise unremarkable MRI scan of the lumbar spine without contrast.                                    DIAGNOSIS   Visit Diagnoses     ICD-10-CM   1. Avascular necrosis of bone of hip, left ([facility]) M87.052   2. Chronic bilateral low back pain without sciatica M54.5    G89.29   3. Peripheral polyneuropathy ([facility]) G62.9   4. ESRD (end stage renal disease) on dialysis ([facility]) N18.6     Z99.2   5. Chronic, continuous use of opioids F11.90   6. Status post total replacement of right hip Z96.641   7. Myalgia M79.10   8. Muscle spasm M62.838   9. Peripheral polyneuropathy G62.9         ASSESSMENT and PLAN:     Debby Carrizales 39 y.o. Female returns for follow-up of her chronic pain related to lupus, AVN hips, low back and peripheral neuropathy    Debby was seen today for follow-up.    Diagnoses and all orders for this visit:    Avascular necrosis of bone of hip, left (HCC)  -     REFERRAL TO PHYSIATRY (PMR)  -     Oxycodone HCl 20 MG Tab; Take 1 Tab by mouth 4 times a day as needed for up to 28 days.  -     Consent for Opiate Prescription  -     Controlled Substance Treatment Agreement    Chronic bilateral low back pain without sciatica  -     Oxycodone HCl 20 MG Tab; Take 1 Tab by mouth 4 times a day as needed for up to 28 days.  -     Consent for Opiate Prescription  -     Controlled Substance Treatment Agreement    Peripheral polyneuropathy ([facility])  -     pregabalin (LYRICA) 75 MG Cap; Take 1 Cap by mouth 3 times a day for 30 days.  -     Oxycodone HCl 20 MG Tab; Take 1 Tab by mouth 4 times a day as needed for up to 28 days.  -     Consent for Opiate Prescription  -     Controlled Substance Treatment Agreement    ESRD (end stage renal disease) on dialysis ([facility])    Chronic, continuous use of opioids    Status post total replacement of right hip    Myalgia  -     REFERRAL TO PHYSIATRY (PMR)    Muscle spasm  -     REFERRAL TO PHYSIATRY (PMR)    Peripheral polyneuropathy  -     pregabalin (LYRICA) 75 MG Cap; Take 1 Cap by mouth 3 times a day for 30 days.  -     Oxycodone HCl 20 MG Tab; Take 1 Tab by mouth 4 times a day as needed for up to 28 days.  -     Consent for Opiate Prescription  -     Controlled Substance Treatment Agreement           1. Encouraged Debby to follow-up with Vascular surgery regarding making plans to manage venous catheter found to be pushing into the tricuspid valve.   "Encouraged her to follow-up with her PCP regarding assistance in facilitating this.  2. Discussed planning to make follow-up with Dr. Holman regarding left hip avascular necrosis.   3, Discussed left hip intra-articular injection.  The risks benefits and alternatives to this procedure were discussed and the patient wishes to proceed with the procedure. Risks include but are not limited to damage to surrounding structures, infection, bleeding, worsening of pain which can be permanent, weakness which can be permanent. Benefits include pain relief, improved function. Alternatives includes not doing the procedure.    4. Continue lyrica 75mg po tid.  Refilled today.  5. Continue oxycodone up to four times a day.  Reviewed  and repeat UDS reviewed.  Discussed plan to work on reducing her opioid dose.     In prescribing controlled substances to this patient, I certify that I have obtained and reviewed the medical history of Debby Carrizales. I have also made a good ly effort to obtain applicable records from other providers who have treated the patient and records demonstrating the following: report of \"drug-seeking behavior,\" although I suspect that she has organic reasons for pain and will continue to monitor as appropriate.     I have conducted a physical exam and documented it. I have reviewed Ms. Carrizales’s prescription history as maintained by the [place] Prescription Monitoring Program.     I have assessed the patient’s risk for abuse, dependency, and addiction using the validated Opioid Risk Tool available at https://www.mdcalc.com/rzetro-nbws-lski-ort-narcotic-abuse.     Given the above, I believe the benefits of controlled substance therapy outweigh the risks. The reasons for prescribing controlled substances include non-narcotic, oral analgesic alternatives have been inadequate for pain control and in my professional opinion, controlled substances are the only reasonable choice for this patient " because she has limitations to medication choices due to being on dialysis.   Accordingly, I have discussed the risk and benefits, treatment plan, and alternative therapies with the patient.       Follow up: For left hip injection and 4 weeks       Please note that this dictation was created using voice recognition software. I have made every reasonable attempt to correct obvious errors but there may be errors of grammar and content that I may have overlooked prior to finalization of this note.      Quinn Chandler MD  Interventional Spine and Sports Physiatry  Physical Medicine and Rehabilitation  [facility]

## 2019-11-21 ENCOUNTER — OFFICE VISIT (OUTPATIENT)
Dept: PHYSICAL MEDICINE AND REHAB | Facility: MEDICAL CENTER | Age: 39
End: 2019-11-21
Payer: MEDICARE

## 2019-11-21 VITALS
SYSTOLIC BLOOD PRESSURE: 122 MMHG | BODY MASS INDEX: 34.54 KG/M2 | OXYGEN SATURATION: 95 % | HEART RATE: 85 BPM | WEIGHT: 175.93 LBS | HEIGHT: 60 IN | TEMPERATURE: 98.5 F | DIASTOLIC BLOOD PRESSURE: 90 MMHG

## 2019-11-21 DIAGNOSIS — M62.838 MUSCLE SPASM: ICD-10-CM

## 2019-11-21 DIAGNOSIS — M79.10 MYALGIA: ICD-10-CM

## 2019-11-21 DIAGNOSIS — N18.6 ESRD (END STAGE RENAL DISEASE) ON DIALYSIS (HCC): ICD-10-CM

## 2019-11-21 DIAGNOSIS — G89.29 CHRONIC BILATERAL LOW BACK PAIN WITHOUT SCIATICA: ICD-10-CM

## 2019-11-21 DIAGNOSIS — Z99.2 ESRD (END STAGE RENAL DISEASE) ON DIALYSIS (HCC): ICD-10-CM

## 2019-11-21 DIAGNOSIS — G62.9 PERIPHERAL POLYNEUROPATHY: ICD-10-CM

## 2019-11-21 DIAGNOSIS — M54.50 CHRONIC BILATERAL LOW BACK PAIN WITHOUT SCIATICA: ICD-10-CM

## 2019-11-21 DIAGNOSIS — M87.052 AVASCULAR NECROSIS OF BONE OF HIP, LEFT (HCC): ICD-10-CM

## 2019-11-21 PROCEDURE — 20553 NJX 1/MLT TRIGGER POINTS 3/>: CPT | Performed by: PHYSICAL MEDICINE & REHABILITATION

## 2019-11-21 PROCEDURE — 99214 OFFICE O/P EST MOD 30 MIN: CPT | Mod: 25 | Performed by: PHYSICAL MEDICINE & REHABILITATION

## 2019-11-21 RX ORDER — PREGABALIN 75 MG/1
75 CAPSULE ORAL 3 TIMES DAILY
Qty: 90 CAP | Refills: 0 | Status: SHIPPED | OUTPATIENT
Start: 2019-12-11 | End: 2019-12-19

## 2019-11-21 RX ORDER — OXYCODONE HYDROCHLORIDE 20 MG/1
20 TABLET ORAL 4 TIMES DAILY PRN
Qty: 108 TAB | Refills: 0 | Status: SHIPPED | OUTPATIENT
Start: 2019-11-21 | End: 2019-12-19 | Stop reason: SDUPTHER

## 2019-11-21 ASSESSMENT — PATIENT HEALTH QUESTIONNAIRE - PHQ9: CLINICAL INTERPRETATION OF PHQ2 SCORE: 0

## 2019-11-21 ASSESSMENT — PAIN SCALES - GENERAL: PAINLEVEL: 8=MODERATE-SEVERE PAIN

## 2019-11-24 NOTE — PROGRESS NOTES
Follow up patient note  Pain Medicine, Interventional spine and sports physiatry, Physical medicine rehabilitation      Chief complaint:   Diffuse joint and body pain, low back, hips and legs      HISTORY      HPI  Patient identification/Interval histotry:     Debby Carrizales 39 y.o. female who has chronic pain related to rheumatologic disease, peripheral neuropathy and AVN hips, lupus.      She is here today for trigger point injections.      She continues to have pain in the low back and primarily left greater than right hip.  She reports that this pain is unchanged since the last visit. Pain in the left hip is worse with walking.      She has some aching pain in her upper back and axial spine as well as cramping pain in her calves.  Her back pain has made it difficult for her to tolerate dialysis, but she reports that she as been tolerating this.  Pain is a 8/10 on the VAS.  Oxycodone helps with her pain.  No significant constipation, she is taking colace and miralax.  She did not have trouble with the gradual reduction in dose.    Lyrica helps with burning pain in her legs. This is stable at current dose.  No side effects.     She is/was(?) on the [facility] transplant list, she has plans to follow-up with her  about this.     ORT: 3  PHQ-9:0    Review of records:   Hospital discharge report noted from 08/12/2019-08/21/2019 admission.  She was admitted for a near syncopal episode.  Cardiac evaluation was suspicious for endocarditis and she was started on empiric IV abx.  GAYATHRI demonstrate no endocarditis, but did show that her venous catheter was pushing through the tricuspid valve.  Cultures were negative.  Dr. Jansen, vascular surgery, was consulted and they discussed follow-up plans for port management and this will be planned after discharge.    ROS   Unchanged from 10/24/2019 except as noted in HPI     Red Flags :   Chills, Sweats:No fevers, chills and night sweats.  No fevers  lately  Involuntary Weight Loss: Denies  Bowel/Bladder Incontinence: Denies  Saddle Anesthesia: Denies    PMHx:   Past Medical History:   Diagnosis Date   • Arthritis     all joints,r/t lupus   • Avascular necrosis of bones of both hips ([facility]) 10/10/2016   • Clostridium difficile colitis 5/3/2011   • Dialysis patient    • ESBL (extended spectrum beta-lactamase) producing bacteria infection 8/25/2014   • Fibromyalgia    • Hypertension    • Lupus ([facility])    • Pneumonia    • Psychiatric disorder     anxiety, depression   • Pyelonephritis 11/21/2017   • Renal failure    • Sepsis due to pneumonia ([facility]) 6/7/2018   • Status epilepticus ([facility]) 12/13/2018       PSHx:   Past Surgical History:   Procedure Laterality Date   • GAYATHRI N/A 8/20/2019    Procedure: ECHOCARDIOGRAM, TRANSESOPHAGEAL;  Surgeon: Marta Elaine M.D.;  Location: [facility];  Service: Cardiac   • AV FISTULA REVISION Right 8/11/2018    Procedure: AV FISTULA REVISION- Graft and Thrombectomy;  Surgeon: Shabbir Ardon M.D.;  Location: [facility];  Service: General   • AV FISTULA THROMBOLYSIS Right 8/11/2018    Procedure: AV FISTULA THROMBOLYSIS;  Surgeon: Shabbir Ardon M.D.;  Location: [facility];  Service: General   • AV FISTULA CREATION Right 12/9/2017    Procedure: AV FISTULA CREATION-ARM FOR GRAFT;  Surgeon: Lesly Jansen M.D.;  Location: [facility];  Service: General   • THROMBECTOMY  12/9/2017    Procedure: THROMBECTOMY;  Surgeon: Lesly Jansen M.D.;  Location: [facility];  Service: General   • THROMBECTOMY Right 8/21/2016    Procedure: THROMBECTOMY - right AV fistula graft with grams;  Surgeon: Shabbir Ardon M.D.;  Location: [facility];  Service:    • ANGIOPLASTY BALLOON  8/21/2016    Procedure: ANGIOPLASTY BALLOON;  Surgeon: Shabbir Ardon M.D.;  Location: [facility];  Service:    • THROMBECTOMY Right 8/20/2016    Procedure:  THROMBECTOMY AV GRAFT;  Surgeon: Shabbir Ardon M.D.;  Location: SURGERY [facility];  Service:    • AV FISTULA CREATION Right 7/12/2016    Procedure: AV FISTULA CREATION WITH GRAFT BRACHIAL AXILLARY;  Surgeon: Shabbir Ardon M.D.;  Location: SURGERY [facility];  Service:    • CLOSED REDUCTION Right 7/5/2016    Procedure: CLOSED REDUCTION- Hip ;  Surgeon: Michael Holman M.D.;  Location: SURGERY [facility];  Service:    • HIP ARTHROPLASTY TOTAL Right 1/18/2016    Procedure: HIP ARTHROPLASTY TOTAL;  Surgeon: Michael Holman M.D.;  Location: [facility];  Service:    • AV FISTULA CREATION  2/3/2015    Performed by Shabbir Ardon M.D. at [facility]   • AV FISTULA CREATION  11/14/2014    Performed by Shabbir Ardon M.D. at [facility]   • AV FISTULA CREATION  9/9/2014    Performed by Shabbir Ardon M.D. at [facility]   • CATH PLACEMENT  9/9/2014    Performed by Shabbir Ardon M.D. at [facility]   • OTHER  5/2011    tracheostomy   • GASTROSCOPY-ENDO  4/27/2011    Performed by PALMIRA DOAN at ENDOSCOPY [facility]   • COLONOSCOPY WITH BIOPSY  4/20/2011    Performed by ALCIRA CRUZ at [facility]   • GASTROSCOPY-ENDO  4/18/2011    Performed by ALCIRA CRUZ at [facility]   • GASTROSCOPY-ENDO  4/10/2011    Performed by SYED JURADO at [facility]   • SCLEROTHERAPHY  4/10/2011    Performed by SYED JURADO at [facility]   • OTHER ABDOMINAL SURGERY      kidney biopsy   • TRACHEOSTOMY         Family history   Denies neuromuscular disease  Family History   Problem Relation Age of Onset   • Heart Disease Mother    • Cancer Father        Medications:   Outpatient Medications Marked as Taking for the 11/21/19 encounter (Office Visit) with Quinn Chandler M.D.   Medication Sig Dispense Refill   • [START ON 12/11/2019] pregabalin (LYRICA) 75 MG  Cap Take 1 Cap by mouth 3 times a day for 30 days. 90 Cap 0   • Oxycodone HCl 20 MG Tab Take 1 Tab by mouth 4 times a day as needed for up to 28 days. 108 Tab 0   • promethazine (PHENERGAN) 25 MG Tab TAKE 1 TABLET BY MOUTH EVERY 8 HOURS AS NEEDED FOR NAUSEA OR VOMITING 60 Tab 1   • amLODIPine (NORVASC) 10 MG Tab Take 1 Tab by mouth 1 time daily as needed (If blood pressure is > 170). 90 Tab 1   • valACYclovir (VALTREX) 500 MG Tab Take 1 Tab by mouth 2 times a day as needed (cold sores). 60 Tab 1   • Cholecalciferol (VITAMIN D3) 85757 UNIT Cap Take 10,000 Units by mouth every day.     • lidocaine (LIDODERM) 5 % Patch Apply 1 Patch to skin as directed as needed (For back pain). On for 12 hours off for 12 hours      • docusate sodium (COLACE) 100 MG Cap Take 100 mg by mouth every day.     • tizanidine (ZANAFLEX) 4 MG Tab Take 8 mg by mouth every bedtime.     • traZODone (DESYREL) 50 MG Tab Take 1 Tab by mouth every bedtime. 90 Tab 3   • omeprazole (PRILOSEC) 20 MG delayed-release capsule Take 1 Cap by mouth every day. 30 Cap 3   • sevelamer carbonate (RENVELA) 800 MG Tab tablet Take 1,600-3,200 mg by mouth See Admin Instructions. 1,600 mg with snacks   3,200 mg with meals     • ferrous sulfate 325 (65 FE) MG tablet Take 325 mg by mouth every day.     • ascorbic acid (ASCORBIC ACID) 500 MG Tab Take 500 mg by mouth every day.     • hydroxychloroquine (PLAQUENIL) 200 MG Tab Take 200 mg by mouth every bedtime.         Allergies:   Allergies   Allergen Reactions   • Lorazepam [Ativan] Anxiety     Patient becomes severely paranoid and agitated   • Morphine Itching     Tolerates Dilaudid   • Seasonal Runny Nose and Itching     Hay fever, sabiha brush       Social Hx:   Social History     Socioeconomic History   • Marital status: Single     Spouse name: Not on file   • Number of children: Not on file   • Years of education: Not on file   • Highest education level: Not on file   Occupational History   • Not on file   Social Needs    • Financial resource strain: Not on file   • Food insecurity:     Worry: Not on file     Inability: Not on file   • Transportation needs:     Medical: Not on file     Non-medical: Not on file   Tobacco Use   • Smoking status: Former Smoker     Packs/day: 0.50     Years: 18.00     Pack years: 9.00     Types: Cigarettes     Start date:      Last attempt to quit: 2011     Years since quittin.4   • Smokeless tobacco: Never Used   • Tobacco comment: started at 15   Substance and Sexual Activity   • Alcohol use: No     Alcohol/week: 0.0 oz     Frequency: Never     Binge frequency: Never   • Drug use: No     Types: Marijuana   • Sexual activity: Not Currently     Partners: Male   Lifestyle   • Physical activity:     Days per week: Not on file     Minutes per session: Not on file   • Stress: Not on file   Relationships   • Social connections:     Talks on phone: Not on file     Gets together: Not on file     Attends Mandaen service: Not on file     Active member of club or organization: Not on file     Attends meetings of clubs or organizations: Not on file     Relationship status: Not on file   • Intimate partner violence:     Fear of current or ex partner: Not on file     Emotionally abused: Not on file     Physically abused: Not on file     Forced sexual activity: Not on file   Other Topics Concern   •  Service No   • Blood Transfusions Yes   • Caffeine Concern No   • Occupational Exposure No   • Hobby Hazards No   • Sleep Concern Yes   • Stress Concern Yes   • Weight Concern Yes   • Special Diet Yes   • Back Care No   • Exercise Yes   • Bike Helmet No   • Seat Belt Yes   • Self-Exams Yes   Social History Narrative   • Not on file       EXAMINATION     Physical Exam:   Vitals: /90 (BP Location: Left arm, Patient Position: Sitting, BP Cuff Size: Large adult long)   Pulse 85   Temp 36.9 °C (98.5 °F) (Temporal)   Ht 1.524 m (5')   Wt 79.8 kg (175 lb 14.8 oz)   SpO2 95%     Constitutional:    Body Habitus: Body mass index is 34.36 kg/m².  Cooperation: Fully cooperates with exam  Appearance: Well-groomed no disheveled, in no acute distress  Respiratory-  breathing comfortable on room air, no wheezing.  Psychiatric- alert and oriented ×3. Normal affect.   Spine:   Tenderness to palpation over the thoracic and lumbar paraspinals bilaterally  Gait slow, steady without loss of balance.  No use of assist device.   Pain with ROM of the left hip.  Doroteo's is positive for left anterior groin pain       MEDICAL DECISION MAKING    DATA    Labs:     UDS:   08/22/2019 Oxycodone and metabolites (patient does not have a script for phenergan with codeine and this was negative) Therefore, consistent with medications  06/13/2019 Negative for oxycodone, positive for other oxycodone metabolites  04/16/2019 Positive for oxymorphone  01/17/2019 Oxycodone and metabolites as expected  10/02/2018 Oxycodone and metabolites, also fentanyl    01/09/2019: Hep B surface ag negative  01/08/2019: GFR 7; BUN 23 Cr 6.68, Plt 160    12/15/2018 CRP 3.03    GFR 08/21/2019, 4  08/21/2019 WBC 4.6, Hb 10.1, Hct 31.6, Plt 156    BMP 12/16/2018  Na 136, Potassium 4.4, chloride 96, CO2 23 Glucose 83, BUN 55H, Cr 9.44H, Calcium 9.9, Anion gap 17.0H    CBC 3.3, Hb 9.6, Hct 31.3, Plt 115    Lab Results   Component Value Date/Time    HBA1C 4.9 06/07/2018 02:29 AM        Imaging:   I reviewed the following radiology reports reviewed  GAYATHRI 08/20/2019  Echocardiography Laboratory  CONCLUSIONS  LV EF    65%.  Structurally normal tricuspid valve.  Trace tricuspid regurgitation.  Port catheter noted in the SVC and right atrium.  Tip support appears bright, no obvious vegetation.  The tip of the port abuts the tricuspid valve.  Recommend the port be pulled back approximately 2 cm.  No evidence of endocarditis.    Xray hip with pelvis-left 07/28/2019  Left femoral head heterogeneous density is compatible with known diagnosis of avascular necrosis. There is  no joint space narrowing or spurring    Right arthroplasty without evident complication.    Xray left foot 01/21/2019  Nondisplaced transverse fracture through the base of the fifth metatarsal.    MRI lumbar spine 1/1/19  1.  Diffusely decreased signal again seen throughout the marrow space consistent with red marrow conversion, likely secondary to chronic anemia.  2.  No significant disc bulging, central canal narrowing, or foraminal narrowing.  3.  No lumbar spine fracture or subluxation.    MRI brain 12/13/2018  1.  There is no hippocampal sclerosis.  2.  There is no evidence of cortical dysplasia or neuronal migrational abnormalities.  3.  A few nonspecific T2 hyperintensities in the supratentorial white matter likely representing nonspecific foci of gliosis, chronic ischemia and demyelination. This is unchanged since the previous MRI dated 2/23/2012.       Chest xray 06/07/2018: Minimal right-sided opacities as described above, suggesting pneumonia.               Results for orders placed during the hospital encounter of 07/19/17   MR-LUMBAR SPINE-W/O    Impression 1.  Diffuse decreased bone marrow signal intensity throughout the lumbar spine and sacrum, presumably secondary to chronic anemia.    2.  Otherwise unremarkable MRI scan of the lumbar spine without contrast.                                    DIAGNOSIS   Visit Diagnoses     ICD-10-CM   1. Avascular necrosis of bone of hip, left ([facility]) M87.052   2. Myalgia M79.10   3. Muscle spasm M62.838   4. Peripheral polyneuropathy ([facility]) G62.9   5. ESRD (end stage renal disease) on dialysis ([facility]) N18.6    Z99.2   6. Peripheral polyneuropathy G62.9   7. Chronic bilateral low back pain without sciatica M54.5    G89.29         ASSESSMENT and PLAN:     Debby Carrizales 39 y.o. Female returns for follow-up of her chronic pain related to lupus, AVN hips, low back and peripheral neuropathy    Debby was seen today for follow-up.    Diagnoses and all orders for this  visit:    Avascular necrosis of bone of hip, left (HCC)  -     Oxycodone HCl 20 MG Tab; Take 1 Tab by mouth 4 times a day as needed for up to 28 days.  -     Consent for Opiate Prescription  -     Controlled Substance Treatment Agreement    Myalgia    Muscle spasm    Peripheral polyneuropathy (HCC)  -     pregabalin (LYRICA) 75 MG Cap; Take 1 Cap by mouth 3 times a day for 30 days.  -     Oxycodone HCl 20 MG Tab; Take 1 Tab by mouth 4 times a day as needed for up to 28 days.  -     Consent for Opiate Prescription  -     Controlled Substance Treatment Agreement    ESRD (end stage renal disease) on dialysis (HCC)    Peripheral polyneuropathy  -     pregabalin (LYRICA) 75 MG Cap; Take 1 Cap by mouth 3 times a day for 30 days.  -     Oxycodone HCl 20 MG Tab; Take 1 Tab by mouth 4 times a day as needed for up to 28 days.  -     Consent for Opiate Prescription  -     Controlled Substance Treatment Agreement    Chronic bilateral low back pain without sciatica  -     Oxycodone HCl 20 MG Tab; Take 1 Tab by mouth 4 times a day as needed for up to 28 days.  -     Consent for Opiate Prescription  -     Controlled Substance Treatment Agreement           1. Encouraged Debby to follow-up with Vascular surgery regarding making plans to manage venous catheter found to be pushing into the tricuspid valve.    2. Discussed left hip intra-articular injection.  The risks benefits and alternatives to this procedure were discussed and the patient wishes to proceed with the procedure. Risks include but are not limited to damage to surrounding structures, infection, bleeding, worsening of pain which can be permanent, weakness which can be permanent. Benefits include pain relief, improved function. Alternatives includes not doing the procedure.  Plan for this to be done 11/27/2019.  4. Continue lyrica 75mg po tid.  Refilled today.  Discussed that we will reassess reducing the dose gradually as she tolerates.  5. Continue oxycodone up to four  "times a day.  Reviewed  and repeat UDS reviewed.  Discussed plan to work on reducing her opioid dose.  Contiue at current dose for this month.  6. Trigger point injections without steroids performed today.  See separate procedure note of the same date.    In prescribing controlled substances to this patient, I certify that I have obtained and reviewed the medical history of Debby Carrizales. I have also made a good ly effort to obtain applicable records from other providers who have treated the patient and records demonstrating the following: report of \"drug-seeking behavior,\" although I suspect that she has organic reasons for pain and will continue to monitor as appropriate.     I have conducted a physical exam and documented it. I have reviewed Ms. Carrizales’s prescription history as maintained by the [place] Prescription Monitoring Program.     I have assessed the patient’s risk for abuse, dependency, and addiction using the validated Opioid Risk Tool available at https://www.mdcalc.com/vikhwt-nxhp-aosb-ort-narcotic-abuse.     Given the above, I believe the benefits of controlled substance therapy outweigh the risks. The reasons for prescribing controlled substances include non-narcotic, oral analgesic alternatives have been inadequate for pain control and in my professional opinion, controlled substances are the only reasonable choice for this patient because she has limitations to medication choices due to being on dialysis.   Accordingly, I have discussed the risk and benefits, treatment plan, and alternative therapies with the patient.       Follow up: For left hip injection and 4 weeks       Please note that this dictation was created using voice recognition software. I have made every reasonable attempt to correct obvious errors but there may be errors of grammar and content that I may have overlooked prior to finalization of this note.      Quinn Chandler MD  Interventional Spine and Sports " Physiatry  Physical Medicine and Rehabilitation  [facility]

## 2019-11-24 NOTE — PROCEDURES
Date of Service: 11/21/2019    Physician/s: Quinn Chandler MD    Pre-operative Diagnosis: Myalgia (M79.1), Muscle spasms (M62.838)    Post-operative Diagnosis: Myalgia (M79.1), Muscle spasms (M62.838)    Procedure: right and left thoracic and lumbar trigger point injections    Description of procedure:    The risks, benefits, and alternatives of the procedure were reviewed and discussed with the patient.  Written informed consent was freely obtained. A pre-procedural time-out was conducted by the physician verifying patient’s identity, procedure to be performed, procedure site and side, and allergy verification. Appropriate equipment was determined to be in place for the procedure.     In the office suite exam room the patient was placed in a prone position and the skin areas for injection over the three locations on the left and three locations on the right each with 1 in the thoracic paraspinals, 1 near the thoracoumbar junction and 1 in the lumbar paraspinals. The areas of pain was then prepped and draped in the usual sterile fashion. A 27g needle was placed into each of the markings at the areas above.  After negative aspiration, approximately 1.5ml of a solution containing 5 mL of 2% lidocaine and 4 mL of sterile saline was injected into the each areas above. The needle was removed intact after each trigger point injection, and the patient's back was covered with a 4x4 gauze, the area was cleansed with sterile normal saline, and a dressing was applied. There were no complications noted.  The patient noted some improvement in her back pain prior to discharge.     Quinn Chandler MD  Physical Medicine and Rehabilitation  Interventional Spine and Sports Physiatry  [facility]

## 2019-11-27 ENCOUNTER — HOSPITAL ENCOUNTER (OUTPATIENT)
Dept: PAIN MANAGEMENT | Facility: REHABILITATION | Age: 39
End: 2019-11-27
Attending: PHYSICAL MEDICINE & REHABILITATION
Payer: MEDICARE

## 2019-11-27 ENCOUNTER — HOSPITAL ENCOUNTER (OUTPATIENT)
Dept: RADIOLOGY | Facility: REHABILITATION | Age: 39
End: 2019-11-27
Attending: PHYSICAL MEDICINE & REHABILITATION

## 2019-11-27 VITALS
SYSTOLIC BLOOD PRESSURE: 107 MMHG | BODY MASS INDEX: 27.03 KG/M2 | WEIGHT: 168.21 LBS | DIASTOLIC BLOOD PRESSURE: 69 MMHG | HEART RATE: 87 BPM | RESPIRATION RATE: 16 BRPM | TEMPERATURE: 97.2 F | OXYGEN SATURATION: 92 % | HEIGHT: 66 IN

## 2019-11-27 PROCEDURE — A9585 GADOBUTROL INJECTION: HCPCS

## 2019-11-27 PROCEDURE — 700117 HCHG RX CONTRAST REV CODE 255

## 2019-11-27 PROCEDURE — 700111 HCHG RX REV CODE 636 W/ 250 OVERRIDE (IP)

## 2019-11-27 PROCEDURE — 20610 DRAIN/INJ JOINT/BURSA W/O US: CPT

## 2019-11-27 PROCEDURE — 700101 HCHG RX REV CODE 250

## 2019-11-27 RX ORDER — LIDOCAINE HYDROCHLORIDE 20 MG/ML
INJECTION, SOLUTION EPIDURAL; INFILTRATION; INTRACAUDAL; PERINEURAL
Status: COMPLETED
Start: 2019-11-27 | End: 2019-11-27

## 2019-11-27 RX ORDER — GADOBUTROL 604.72 MG/ML
INJECTION INTRAVENOUS
Status: COMPLETED
Start: 2019-11-27 | End: 2019-11-27

## 2019-11-27 RX ORDER — DEXAMETHASONE SODIUM PHOSPHATE 10 MG/ML
INJECTION, SOLUTION INTRAMUSCULAR; INTRAVENOUS
Status: COMPLETED
Start: 2019-11-27 | End: 2019-11-27

## 2019-11-27 RX ORDER — BUPIVACAINE HYDROCHLORIDE 2.5 MG/ML
INJECTION, SOLUTION EPIDURAL; INFILTRATION; INTRACAUDAL
Status: COMPLETED
Start: 2019-11-27 | End: 2019-11-27

## 2019-11-27 RX ADMIN — BUPIVACAINE HYDROCHLORIDE 2 ML: 2.5 INJECTION, SOLUTION EPIDURAL; INFILTRATION; INTRACAUDAL; PERINEURAL at 10:52

## 2019-11-27 RX ADMIN — GADOBUTROL 1 ML: 604.72 INJECTION INTRAVENOUS at 10:52

## 2019-11-27 RX ADMIN — DEXAMETHASONE SODIUM PHOSPHATE 10 MG: 10 INJECTION, SOLUTION INTRAMUSCULAR; INTRAVENOUS at 10:52

## 2019-11-27 RX ADMIN — LIDOCAINE HYDROCHLORIDE 5 ML: 20 INJECTION, SOLUTION EPIDURAL; INFILTRATION; INTRACAUDAL; PERINEURAL at 10:52

## 2019-11-27 NOTE — PROCEDURES
Physician/s: Quinn Chandler MD     Pre-operative Diagnosis: Left hip avascular necrosis, left hip pain     Post-operative Diagnosis: Left hip avascular necrosis, left hip pain     Procedure: Left diagnostic and therapeutic intra-articular Hip injection with fluoroscopic guidance     Description of procedure:     The risks, benefits, and alternatives of the procedure were reviewed and discussed with the patient.  Written informed consent was freely obtained. A pre-procedural time-out was conducted by the physician verifying patient’s identity, procedure to be performed, procedure site and side, and allergy verification. Appropriate equipment was determined to be in place for the procedure.      No sedation was used for this procedure.      In the procedure suite the patient was placed in a prone position with and the skin was prepped and draped in the usual sterile fashion. A 25-gauge 1.5 inch needle was used with approximately 2 mL of 1% lidocaine for local anesthesia at the junction of the femoral head and neck. A 22 gauge 5 inch was placed into skin and advanced under fluoroscopic guidance into the joint space.  Then, using Gadavist, 0.5 mL of contrast was injected to demonstrate outlining joint and was no vascular update. Following negative aspiration, approx 2cc of 2% lidocaine without epinephrine, 2cc of 0.25% sensorcaine and 1cc of 10mg/cc dexamethasone was then injected into the joint, and the needle was subsequently removed.  The needle was removed leaving a trail of 1% lidocaine. The patient's hip was wiped with a 4x4 gauze, the area was cleansed with alcohol prep, and a bandaid was applied. There were no complications noted.      The patient was discharged to the recovery area in good condition.     Quinn Chandler MD  Physical Medicine and Rehabilitation  Interventional Spine and Sports Physiatry  [facility]

## 2019-11-27 NOTE — NON-PROVIDER
Current meds. See medication reconciliation form. Reviewed with pt. Pt denies taking ASA,other blood thinners or anti-inflammatories. Pt has a ride post-procedure ([place] is ). Printed and verbal discharge instructions given to pt who verbalized understanding.

## 2019-11-27 NOTE — NON-PROVIDER
Pt tolerated po fluid post procedure without nausea or vomiting. Ambulated with 1 standby assist. Discharged into care of responsible adult.Pt knocked over a lidless  cup of hot coffee while sitting in her recovery chair attempting to readjust her position.this occurred in the presence of myself and her sister Tamica. Pt had not been sedated. A portion of the coffee splashed onto pt's left lateral abdomen and soaked thru her clothing. Ice pack immediately applied to area. There was no redness or blistering noted when area was examined by me and .

## 2019-12-19 ENCOUNTER — OFFICE VISIT (OUTPATIENT)
Dept: PHYSICAL MEDICINE AND REHAB | Facility: MEDICAL CENTER | Age: 39
End: 2019-12-19
Payer: MEDICARE

## 2019-12-19 VITALS
BODY MASS INDEX: 31 KG/M2 | WEIGHT: 186.07 LBS | HEIGHT: 65 IN | TEMPERATURE: 98.5 F | SYSTOLIC BLOOD PRESSURE: 124 MMHG | OXYGEN SATURATION: 100 % | DIASTOLIC BLOOD PRESSURE: 78 MMHG | HEART RATE: 82 BPM

## 2019-12-19 DIAGNOSIS — N18.6 ESRD (END STAGE RENAL DISEASE) ON DIALYSIS (HCC): ICD-10-CM

## 2019-12-19 DIAGNOSIS — M62.838 MUSCLE SPASM: ICD-10-CM

## 2019-12-19 DIAGNOSIS — F11.90 CHRONIC, CONTINUOUS USE OF OPIOIDS: ICD-10-CM

## 2019-12-19 DIAGNOSIS — Z99.2 ESRD (END STAGE RENAL DISEASE) ON DIALYSIS (HCC): ICD-10-CM

## 2019-12-19 DIAGNOSIS — G62.9 PERIPHERAL POLYNEUROPATHY: ICD-10-CM

## 2019-12-19 DIAGNOSIS — M87.052 AVASCULAR NECROSIS OF BONE OF HIP, LEFT (HCC): ICD-10-CM

## 2019-12-19 DIAGNOSIS — M54.50 CHRONIC BILATERAL LOW BACK PAIN WITHOUT SCIATICA: ICD-10-CM

## 2019-12-19 DIAGNOSIS — M79.10 MYALGIA: ICD-10-CM

## 2019-12-19 DIAGNOSIS — G89.29 CHRONIC BILATERAL LOW BACK PAIN WITHOUT SCIATICA: ICD-10-CM

## 2019-12-19 PROCEDURE — 99214 OFFICE O/P EST MOD 30 MIN: CPT | Performed by: PHYSICAL MEDICINE & REHABILITATION

## 2019-12-19 RX ORDER — PREGABALIN 50 MG/1
50 CAPSULE ORAL 3 TIMES DAILY
Qty: 90 CAP | Refills: 0 | Status: SHIPPED | OUTPATIENT
Start: 2020-01-14 | End: 2020-01-16 | Stop reason: SDUPTHER

## 2019-12-19 RX ORDER — OXYCODONE HYDROCHLORIDE 20 MG/1
20 TABLET ORAL 4 TIMES DAILY PRN
Qty: 108 TAB | Refills: 0 | Status: SHIPPED | OUTPATIENT
Start: 2019-12-19 | End: 2020-01-16 | Stop reason: SDUPTHER

## 2019-12-19 ASSESSMENT — PAIN SCALES - GENERAL: PAINLEVEL: 7=MODERATE-SEVERE PAIN

## 2019-12-19 ASSESSMENT — PATIENT HEALTH QUESTIONNAIRE - PHQ9: CLINICAL INTERPRETATION OF PHQ2 SCORE: 0

## 2019-12-19 NOTE — PROGRESS NOTES
Follow up patient note  Pain Medicine, Interventional spine and sports physiatry, Physical medicine rehabilitation      Chief complaint:   Diffuse joint and body pain, low back, hips and legs      HISTORY      HPI  Patient identification/Interval histotry:     Debby Carrizales 39 y.o. female who has chronic pain related to rheumatologic disease, peripheral neuropathy and AVN hips, lupus.      She is here today after her left hip injection on 11/27/2019.  She reports that this has been very helpful, but her right hip has been bothering her more now.    The trigger point injections seemed to help with her thoracic and lumbar trigger points.    The right hip has been bothering her more now.  It seems like she even had to use her wheelchair last week due to the pain.  She has plans to see her Orthopedic surgeon, previously replaced.    Dialysis is tough to tolerate due to back and right hip pain    Pain is 7/10 on the VAS.    Lyrica helps with burning pain in her legs.  No side effects.     She is/was(?) on the [facility] transplant list, she has plans to follow-up with her  about this.  This has not happened yet.     ORT: 3  PHQ-9:0    Review of records:   Hospital discharge report noted from 08/12/2019-08/21/2019 admission.  She was admitted for a near syncopal episode.  Cardiac evaluation was suspicious for endocarditis and she was started on empiric IV abx.  GAYATHRI demonstrate no endocarditis, but did show that her venous catheter was pushing through the tricuspid valve.  Cultures were negative.  Dr. Jansen, vascular surgery, was consulted and they discussed follow-up plans for port management and this will be planned after discharge.    ROS   Unchanged from 11/21/2019 except as noted in HPI     Red Flags :   Chills, Sweats:No fevers, chills and night sweats.  No fevers lately  Involuntary Weight Loss: Denies  Bowel/Bladder Incontinence: Denies  Saddle Anesthesia: Denies    PMHx:   Past Medical  History:   Diagnosis Date   • Arthritis     all joints,r/t lupus   • Avascular necrosis of bones of both hips ([facility]) 10/10/2016   • Clostridium difficile colitis 5/3/2011   • Dialysis patient    • ESBL (extended spectrum beta-lactamase) producing bacteria infection 8/25/2014   • Fibromyalgia    • Hypertension    • Lupus ([facility])    • Pneumonia    • Psychiatric disorder     anxiety, depression   • Pyelonephritis 11/21/2017   • Renal failure    • Sepsis due to pneumonia ([facility]) 6/7/2018   • Status epilepticus ([facility]) 12/13/2018       PSHx:   Past Surgical History:   Procedure Laterality Date   • GAYATHRI N/A 8/20/2019    Procedure: ECHOCARDIOGRAM, TRANSESOPHAGEAL;  Surgeon: Marta Elaine M.D.;  Location: [facility];  Service: Cardiac   • AV FISTULA REVISION Right 8/11/2018    Procedure: AV FISTULA REVISION- Graft and Thrombectomy;  Surgeon: Shabbir Ardon M.D.;  Location: [facility];  Service: General   • AV FISTULA THROMBOLYSIS Right 8/11/2018    Procedure: AV FISTULA THROMBOLYSIS;  Surgeon: Shabbir Ardon M.D.;  Location: [facility];  Service: General   • AV FISTULA CREATION Right 12/9/2017    Procedure: AV FISTULA CREATION-ARM FOR GRAFT;  Surgeon: Lesly Jansen M.D.;  Location: [facility];  Service: General   • THROMBECTOMY  12/9/2017    Procedure: THROMBECTOMY;  Surgeon: Lesly Jansen M.D.;  Location: [facility];  Service: General   • THROMBECTOMY Right 8/21/2016    Procedure: THROMBECTOMY - right AV fistula graft with grams;  Surgeon: Shabbir Ardon M.D.;  Location: [facility];  Service:    • ANGIOPLASTY BALLOON  8/21/2016    Procedure: ANGIOPLASTY BALLOON;  Surgeon: Shabbir Ardon M.D.;  Location: [facility];  Service:    • THROMBECTOMY Right 8/20/2016    Procedure: THROMBECTOMY AV GRAFT;  Surgeon: Shabbir Ardon M.D.;  Location: [facility];  Service:    • AV FISTULA CREATION Right  7/12/2016    Procedure: AV FISTULA CREATION WITH GRAFT BRACHIAL AXILLARY;  Surgeon: Shabbir Ardon M.D.;  Location: SURGERY [facility];  Service:    • CLOSED REDUCTION Right 7/5/2016    Procedure: CLOSED REDUCTION- Hip ;  Surgeon: Michael Holman M.D.;  Location: SURGERY [facility];  Service:    • HIP ARTHROPLASTY TOTAL Right 1/18/2016    Procedure: HIP ARTHROPLASTY TOTAL;  Surgeon: Michael Holman M.D.;  Location: SURGERY [facility];  Service:    • AV FISTULA CREATION  2/3/2015    Performed by Shabbir Ardon M.D. at SURGERY [facility]   • AV FISTULA CREATION  11/14/2014    Performed by Shabbir Ardon M.D. at SURGERY [facility]   • AV FISTULA CREATION  9/9/2014    Performed by Shabbir Ardon M.D. at SURGERY [facility]   • CATH PLACEMENT  9/9/2014    Performed by Shabbir Ardon M.D. at SURGERY [facility]   • OTHER  5/2011    tracheostomy   • GASTROSCOPY-ENDO  4/27/2011    Performed by PALMIRA DOAN at ENDOSCOPY [facility]   • COLONOSCOPY WITH BIOPSY  4/20/2011    Performed by ALCIRA CRUZ at [facility]   • GASTROSCOPY-ENDO  4/18/2011    Performed by ALCIRA CRUZ at [facility]   • GASTROSCOPY-ENDO  4/10/2011    Performed by SYED JURADO at [facility]   • SCLEROTHERAPHY  4/10/2011    Performed by SYED JURADO at [facility]   • OTHER ABDOMINAL SURGERY      kidney biopsy   • TRACHEOSTOMY         Family history   Denies neuromuscular disease  Family History   Problem Relation Age of Onset   • Heart Disease Mother    • Cancer Father        Medications:   Outpatient Medications Marked as Taking for the 12/19/19 encounter (Office Visit) with Quinn Chandler M.D.   Medication Sig Dispense Refill   • Oxycodone HCl 20 MG Tab Take 1 Tab by mouth 4 times a day as needed for up to 28 days. 108 Tab 0   • [START ON 1/14/2020] pregabalin (LYRICA) 50 MG capsule Take 1 Cap by mouth 3 times  a day for 30 days. 90 Cap 0   • promethazine (PHENERGAN) 25 MG Tab TAKE 1 TABLET BY MOUTH EVERY 8 HOURS AS NEEDED FOR NAUSEA OR VOMITING 60 Tab 1   • amLODIPine (NORVASC) 10 MG Tab Take 1 Tab by mouth 1 time daily as needed (If blood pressure is > 170). 90 Tab 1   • valACYclovir (VALTREX) 500 MG Tab Take 1 Tab by mouth 2 times a day as needed (cold sores). 60 Tab 1   • Cholecalciferol (VITAMIN D3) 30157 UNIT Cap Take 10,000 Units by mouth every day.     • lidocaine (LIDODERM) 5 % Patch Apply 1 Patch to skin as directed as needed (For back pain). On for 12 hours off for 12 hours      • docusate sodium (COLACE) 100 MG Cap Take 100 mg by mouth every day.     • tizanidine (ZANAFLEX) 4 MG Tab Take 8 mg by mouth every bedtime.     • traZODone (DESYREL) 50 MG Tab Take 1 Tab by mouth every bedtime. 90 Tab 3   • omeprazole (PRILOSEC) 20 MG delayed-release capsule Take 1 Cap by mouth every day. 30 Cap 3   • sevelamer carbonate (RENVELA) 800 MG Tab tablet Take 1,600-3,200 mg by mouth See Admin Instructions. 1,600 mg with snacks   3,200 mg with meals     • ferrous sulfate 325 (65 FE) MG tablet Take 325 mg by mouth every day.     • ascorbic acid (ASCORBIC ACID) 500 MG Tab Take 500 mg by mouth every day.     • hydroxychloroquine (PLAQUENIL) 200 MG Tab Take 200 mg by mouth every bedtime.         Allergies:   Allergies   Allergen Reactions   • Lorazepam [Ativan] Anxiety     Patient becomes severely paranoid and agitated   • Morphine Itching     Tolerates Dilaudid   • Seasonal Runny Nose and Itching     Hay fever, sabiha brush       Social Hx:   Social History     Socioeconomic History   • Marital status: Single     Spouse name: Not on file   • Number of children: Not on file   • Years of education: Not on file   • Highest education level: Not on file   Occupational History   • Not on file   Social Needs   • Financial resource strain: Not on file   • Food insecurity:     Worry: Not on file     Inability: Not on file   • Transportation  "needs:     Medical: Not on file     Non-medical: Not on file   Tobacco Use   • Smoking status: Former Smoker     Packs/day: 0.50     Years: 18.00     Pack years: 9.00     Types: Cigarettes     Start date:      Last attempt to quit: 2011     Years since quittin.5   • Smokeless tobacco: Never Used   • Tobacco comment: started at 15   Substance and Sexual Activity   • Alcohol use: No     Alcohol/week: 0.0 oz     Frequency: Never     Binge frequency: Never   • Drug use: No     Types: Marijuana   • Sexual activity: Not Currently     Partners: Male   Lifestyle   • Physical activity:     Days per week: Not on file     Minutes per session: Not on file   • Stress: Not on file   Relationships   • Social connections:     Talks on phone: Not on file     Gets together: Not on file     Attends Rastafari service: Not on file     Active member of club or organization: Not on file     Attends meetings of clubs or organizations: Not on file     Relationship status: Not on file   • Intimate partner violence:     Fear of current or ex partner: Not on file     Emotionally abused: Not on file     Physically abused: Not on file     Forced sexual activity: Not on file   Other Topics Concern   •  Service No   • Blood Transfusions Yes   • Caffeine Concern No   • Occupational Exposure No   • Hobby Hazards No   • Sleep Concern Yes   • Stress Concern Yes   • Weight Concern Yes   • Special Diet Yes   • Back Care No   • Exercise Yes   • Bike Helmet No   • Seat Belt Yes   • Self-Exams Yes   Social History Narrative   • Not on file       EXAMINATION     Physical Exam:   Vitals: /78 (BP Location: Left arm, Patient Position: Sitting, BP Cuff Size: Large adult long)   Pulse 82   Temp 36.9 °C (98.5 °F) (Temporal)   Ht 1.651 m (5' 5\")   Wt 84.4 kg (186 lb 1.1 oz)   SpO2 100%     Constitutional:   Body Habitus: Body mass index is 30.96 kg/m².  Cooperation: Fully cooperates with exam  Appearance: Well-groomed no disheveled, " in no acute distress  Respiratory-  breathing comfortable on room air, no wheezing.  Psychiatric- alert and oriented ×3. Normal affect.   Spine:   Tenderness to palpation over the thoracic and lumbar paraspinals bilaterally  Gait slow, steady without loss of balance.  No use of assist device.   No pain with ROM of the left hip.  No skin lesion over the region of injection  Pain with ROM of the right hip.      MEDICAL DECISION MAKING    DATA    Labs:     UDS:   08/22/2019 Oxycodone and metabolites (patient does not have a script for phenergan with codeine and this was negative) Therefore, consistent with medications  06/13/2019 Negative for oxycodone, positive for other oxycodone metabolites  04/16/2019 Positive for oxymorphone  01/17/2019 Oxycodone and metabolites as expected  10/02/2018 Oxycodone and metabolites, also fentanyl    01/09/2019: Hep B surface ag negative  01/08/2019: GFR 7; BUN 23 Cr 6.68, Plt 160    12/15/2018 CRP 3.03    GFR 08/21/2019, 4  08/21/2019 WBC 4.6, Hb 10.1, Hct 31.6, Plt 156    BMP 12/16/2018  Na 136, Potassium 4.4, chloride 96, CO2 23 Glucose 83, BUN 55H, Cr 9.44H, Calcium 9.9, Anion gap 17.0H    CBC 3.3, Hb 9.6, Hct 31.3, Plt 115    Lab Results   Component Value Date/Time    HBA1C 4.9 06/07/2018 02:29 AM        Imaging:   I reviewed the following radiology reports reviewed  GAYATHRI 08/20/2019  Echocardiography Laboratory  CONCLUSIONS  LV EF    65%.  Structurally normal tricuspid valve.  Trace tricuspid regurgitation.  Port catheter noted in the SVC and right atrium.  Tip support appears bright, no obvious vegetation.  The tip of the port abuts the tricuspid valve.  Recommend the port be pulled back approximately 2 cm.  No evidence of endocarditis.    Xray hip with pelvis-left 07/28/2019  Left femoral head heterogeneous density is compatible with known diagnosis of avascular necrosis. There is no joint space narrowing or spurring    Right arthroplasty without evident complication.    Xray  left foot 01/21/2019  Nondisplaced transverse fracture through the base of the fifth metatarsal.    MRI lumbar spine 1/1/19  1.  Diffusely decreased signal again seen throughout the marrow space consistent with red marrow conversion, likely secondary to chronic anemia.  2.  No significant disc bulging, central canal narrowing, or foraminal narrowing.  3.  No lumbar spine fracture or subluxation.    MRI brain 12/13/2018  1.  There is no hippocampal sclerosis.  2.  There is no evidence of cortical dysplasia or neuronal migrational abnormalities.  3.  A few nonspecific T2 hyperintensities in the supratentorial white matter likely representing nonspecific foci of gliosis, chronic ischemia and demyelination. This is unchanged since the previous MRI dated 2/23/2012.       Chest xray 06/07/2018: Minimal right-sided opacities as described above, suggesting pneumonia.               Results for orders placed during the hospital encounter of 07/19/17   MR-LUMBAR SPINE-W/O    Impression 1.  Diffuse decreased bone marrow signal intensity throughout the lumbar spine and sacrum, presumably secondary to chronic anemia.    2.  Otherwise unremarkable MRI scan of the lumbar spine without contrast.                                    DIAGNOSIS   Visit Diagnoses     ICD-10-CM   1. Myalgia M79.10   2. Muscle spasm M62.838   3. Avascular necrosis of bone of hip, left ([facility]) M87.052   4. Peripheral polyneuropathy ([facility]) G62.9   5. ESRD (end stage renal disease) on dialysis ([facility]) N18.6    Z99.2   6. Chronic bilateral low back pain without sciatica M54.5    G89.29   7. Chronic, continuous use of opioids F11.90   8. Peripheral polyneuropathy G62.9         ASSESSMENT and PLAN:     Debby Carrizales 39 y.o. Female returns for follow-up of her chronic pain related to lupus, AVN hips, low back and peripheral neuropathy    Debby was seen today for follow-up.    Diagnoses and all orders for this visit:    Myalgia  -     REFERRAL TO PHYSIATRY  (PMR)    Muscle spasm  -     REFERRAL TO PHYSIATRY (PMR)    Avascular necrosis of bone of hip, left (HCC)  -     Pain Management Screen  -     Oxycodone HCl 20 MG Tab; Take 1 Tab by mouth 4 times a day as needed for up to 28 days.  -     Consent for Opiate Prescription  -     Controlled Substance Treatment Agreement    Peripheral polyneuropathy (HCC)  -     Pain Management Screen  -     Oxycodone HCl 20 MG Tab; Take 1 Tab by mouth 4 times a day as needed for up to 28 days.  -     pregabalin (LYRICA) 50 MG capsule; Take 1 Cap by mouth 3 times a day for 30 days.  -     Consent for Opiate Prescription  -     Controlled Substance Treatment Agreement    ESRD (end stage renal disease) on dialysis ([facility])    Chronic bilateral low back pain without sciatica  -     Oxycodone HCl 20 MG Tab; Take 1 Tab by mouth 4 times a day as needed for up to 28 days.  -     Consent for Opiate Prescription  -     Controlled Substance Treatment Agreement    Chronic, continuous use of opioids    Peripheral polyneuropathy  -     Pain Management Screen  -     Oxycodone HCl 20 MG Tab; Take 1 Tab by mouth 4 times a day as needed for up to 28 days.  -     pregabalin (LYRICA) 50 MG capsule; Take 1 Cap by mouth 3 times a day for 30 days.  -     Consent for Opiate Prescription  -     Controlled Substance Treatment Agreement       1.  Discussed improvement after left hip intra-articular injection.  She will follow-up with her surgeon regarding worsening right hip pain, s/p hip replacement.  2. Discussed trigger point injections.  The risks benefits and alternatives to this procedure were discussed and the patient wishes to proceed with the procedure. Risks include but are not limited to damage to surrounding structures, infection, bleeding, worsening of pain which can be permanent, weakness which can be permanent. Benefits include pain relief, improved function. Alternatives includes not doing the procedure.    3. Encouraged Debby to follow-up with  "Vascular surgery via PCP regarding making plans to manage venous catheter found to be pushing into the tricuspid valve.  4. Continue lyrica 75mg po tid.  Refilled today.  Will consider reducing this dose at the next visit.  5. Continue oxycodone up to four times a day.  Reviewed  and repeat UDS reviewed.  No changes to dose made today.    In prescribing controlled substances to this patient, I certify that I have obtained and reviewed the medical history of Debby Carrizales. I have also made a good ly effort to obtain applicable records from other providers who have treated the patient and records demonstrating the following: report of \"drug-seeking behavior,\" although I suspect that she has organic reasons for pain and will continue to monitor as appropriate.     I have conducted a physical exam and documented it. I have reviewed Ms. Carrizales’s prescription history as maintained by the [place] Prescription Monitoring Program.     I have assessed the patient’s risk for abuse, dependency, and addiction using the validated Opioid Risk Tool available at https://www.mdcalc.com/vfqkjg-infe-hsvb-ort-narcotic-abuse.     Given the above, I believe the benefits of controlled substance therapy outweigh the risks. The reasons for prescribing controlled substances include non-narcotic, oral analgesic alternatives have been inadequate for pain control and in my professional opinion, controlled substances are the only reasonable choice for this patient because she has limitations to medication choices due to being on dialysis.   Accordingly, I have discussed the risk and benefits, treatment plan, and alternative therapies with the patient.       Follow up: 4 weeks for trigger point injections      Please note that this dictation was created using voice recognition software. I have made every reasonable attempt to correct obvious errors but there may be errors of grammar and content that I may have overlooked prior to " finalization of this note.      Quinn Chandler MD  Interventional Spine and Sports Physiatry  Physical Medicine and Rehabilitation  [facility]

## 2020-01-16 ENCOUNTER — APPOINTMENT (OUTPATIENT)
Dept: PHYSICAL MEDICINE AND REHAB | Facility: MEDICAL CENTER | Age: 40
End: 2020-01-16
Payer: MEDICARE

## 2020-01-16 ENCOUNTER — OFFICE VISIT (OUTPATIENT)
Dept: PHYSICAL MEDICINE AND REHAB | Facility: MEDICAL CENTER | Age: 40
End: 2020-01-16

## 2020-01-16 VITALS
WEIGHT: 184.53 LBS | HEART RATE: 88 BPM | OXYGEN SATURATION: 95 % | HEIGHT: 65 IN | BODY MASS INDEX: 30.74 KG/M2 | TEMPERATURE: 97.2 F | SYSTOLIC BLOOD PRESSURE: 126 MMHG | DIASTOLIC BLOOD PRESSURE: 90 MMHG

## 2020-01-16 DIAGNOSIS — F11.90 CHRONIC, CONTINUOUS USE OF OPIOIDS: ICD-10-CM

## 2020-01-16 DIAGNOSIS — N18.6 ESRD (END STAGE RENAL DISEASE) ON DIALYSIS (HCC): ICD-10-CM

## 2020-01-16 DIAGNOSIS — Z96.641 STATUS POST TOTAL REPLACEMENT OF RIGHT HIP: ICD-10-CM

## 2020-01-16 DIAGNOSIS — G62.9 PERIPHERAL POLYNEUROPATHY: ICD-10-CM

## 2020-01-16 DIAGNOSIS — M87.052 AVASCULAR NECROSIS OF BONE OF HIP, LEFT (HCC): ICD-10-CM

## 2020-01-16 DIAGNOSIS — M79.10 MYALGIA: ICD-10-CM

## 2020-01-16 DIAGNOSIS — G89.29 CHRONIC BILATERAL LOW BACK PAIN WITHOUT SCIATICA: ICD-10-CM

## 2020-01-16 DIAGNOSIS — Z99.2 ESRD (END STAGE RENAL DISEASE) ON DIALYSIS (HCC): ICD-10-CM

## 2020-01-16 DIAGNOSIS — M62.838 MUSCLE SPASM: ICD-10-CM

## 2020-01-16 DIAGNOSIS — M54.50 CHRONIC BILATERAL LOW BACK PAIN WITHOUT SCIATICA: ICD-10-CM

## 2020-01-16 PROCEDURE — 99214 OFFICE O/P EST MOD 30 MIN: CPT | Mod: 25 | Performed by: PHYSICAL MEDICINE & REHABILITATION

## 2020-01-16 PROCEDURE — 20553 NJX 1/MLT TRIGGER POINTS 3/>: CPT | Performed by: PHYSICAL MEDICINE & REHABILITATION

## 2020-01-16 RX ORDER — OXYCODONE HYDROCHLORIDE 20 MG/1
20 TABLET ORAL 4 TIMES DAILY PRN
Qty: 108 TAB | Refills: 0 | Status: SHIPPED | OUTPATIENT
Start: 2020-01-16 | End: 2020-02-13

## 2020-01-16 RX ORDER — LACOSAMIDE 100 MG/1
100 TABLET ORAL 2 TIMES DAILY
COMMUNITY

## 2020-01-16 RX ORDER — PREGABALIN 50 MG/1
50 CAPSULE ORAL 3 TIMES DAILY
Qty: 90 CAP | Refills: 0 | Status: SHIPPED | OUTPATIENT
Start: 2020-02-13 | End: 2020-03-14

## 2020-01-16 RX ORDER — SUCROFERRIC OXYHYDROXIDE 500 MG/1
TABLET, CHEWABLE ORAL
COMMUNITY
Start: 2019-12-26 | End: 2020-07-28

## 2020-01-16 RX ORDER — OXYCODONE HYDROCHLORIDE 20 MG/1
1 TABLET ORAL 4 TIMES DAILY PRN
Qty: 108 TAB | Refills: 0 | Status: SHIPPED | OUTPATIENT
Start: 2020-02-13 | End: 2020-03-12 | Stop reason: SDUPTHER

## 2020-01-16 ASSESSMENT — PAIN SCALES - GENERAL: PAINLEVEL: 8=MODERATE-SEVERE PAIN

## 2020-01-16 ASSESSMENT — PATIENT HEALTH QUESTIONNAIRE - PHQ9: CLINICAL INTERPRETATION OF PHQ2 SCORE: 0

## 2020-01-16 NOTE — PROGRESS NOTES
Follow up patient note  Pain Medicine, Interventional spine and sports physiatry, Physical medicine rehabilitation      Chief complaint:   Diffuse joint and body pain, low back, hips and legs      HISTORY      HPI  Patient identification/Interval histotry:     Debby Carrizales 39 y.o. female who has chronic pain related to rheumatologic disease, peripheral neuropathy and AVN hips, lupus.      Since the last visit, Debby has bene having intermittent pain in her low back.  It has been fluctuating.  It was a lot better after the trigger point injections on 11/21/2019, but this has started to return and she has symptoms in the upper back as well.      Her left hip pain has been better after left hip injection on 11/27/2019.  It is not bothering her nearly as much now.  The right hip, which has been replaced is bothering her more right now.    Dialysis is tough to tolerate due to back and right hip pain in the last week.    Pain is 8/10 on the VAS.    Lyrica helps with burning pain in her legs.  No side effects.  She just refilled this at the 50mg, which is a reduced dose.  It is too early to tell if this has made a difference yet as it is only one day.     She is/was(?) on the [facility] transplant list, she has plans to follow-up with her  about this.  This has not happened yet.     ORT: 3  PHQ-9:0    Review of records:   Hospital discharge report noted from 08/12/2019-08/21/2019 admission.  She was admitted for a near syncopal episode.  Cardiac evaluation was suspicious for endocarditis and she was started on empiric IV abx.  GAYATHRI demonstrate no endocarditis, but did show that her venous catheter was pushing through the tricuspid valve.  Cultures were negative.  Dr. Jansen, vascular surgery, was consulted and they discussed follow-up plans for port management and this will be planned after discharge.    ROS   Unchanged from 12/19/2019 except as noted in HPI     Red Flags :   Chills, Sweats:No  fevers, chills and night sweats.  No fevers lately  Involuntary Weight Loss: Denies  Bowel/Bladder Incontinence: Denies  Saddle Anesthesia: Denies    PMHx:   Past Medical History:   Diagnosis Date   • Arthritis     all joints,r/t lupus   • Avascular necrosis of bones of both hips ([facility]) 10/10/2016   • Clostridium difficile colitis 5/3/2011   • Dialysis patient    • ESBL (extended spectrum beta-lactamase) producing bacteria infection 8/25/2014   • Fibromyalgia    • Hypertension    • Lupus ([facility])    • Pneumonia    • Psychiatric disorder     anxiety, depression   • Pyelonephritis 11/21/2017   • Renal failure    • Sepsis due to pneumonia ([facility]) 6/7/2018   • Status epilepticus ([facility]) 12/13/2018       PSHx:   Past Surgical History:   Procedure Laterality Date   • GAYATHRI N/A 8/20/2019    Procedure: ECHOCARDIOGRAM, TRANSESOPHAGEAL;  Surgeon: Marta Elaine M.D.;  Location: [facility];  Service: Cardiac   • AV FISTULA REVISION Right 8/11/2018    Procedure: AV FISTULA REVISION- Graft and Thrombectomy;  Surgeon: Shabbir Ardon M.D.;  Location: [facility];  Service: General   • AV FISTULA THROMBOLYSIS Right 8/11/2018    Procedure: AV FISTULA THROMBOLYSIS;  Surgeon: Shabbir Ardon M.D.;  Location: [facility];  Service: General   • AV FISTULA CREATION Right 12/9/2017    Procedure: AV FISTULA CREATION-ARM FOR GRAFT;  Surgeon: Lesly Jansen M.D.;  Location: [facility];  Service: General   • THROMBECTOMY  12/9/2017    Procedure: THROMBECTOMY;  Surgeon: Lsely Jansen M.D.;  Location: [facility];  Service: General   • THROMBECTOMY Right 8/21/2016    Procedure: THROMBECTOMY - right AV fistula graft with grams;  Surgeon: Shabbir Ardon M.D.;  Location: [facility];  Service:    • ANGIOPLASTY BALLOON  8/21/2016    Procedure: ANGIOPLASTY BALLOON;  Surgeon: Shabbir Ardon M.D.;  Location: [facility];  Service:    •  THROMBECTOMY Right 8/20/2016    Procedure: THROMBECTOMY AV GRAFT;  Surgeon: Shabbir Ardon M.D.;  Location: SUR[facility]ERY [facility];  Service:    • AV FISTULA CREATION Right 7/12/2016    Procedure: AV FISTULA CREATION WITH GRAFT BRACHIAL AXILLARY;  Surgeon: Shabbir Ardon M.D.;  Location: SURGERY [facility];  Service:    • CLOSED REDUCTION Right 7/5/2016    Procedure: CLOSED REDUCTION- Hip ;  Surgeon: Michael Holman M.D.;  Location: SURGERY [facility];  Service:    • HIP ARTHROPLASTY TOTAL Right 1/18/2016    Procedure: HIP ARTHROPLASTY TOTAL;  Surgeon: Michael Holman M.D.;  Location: SURGERY [facility];  Service:    • AV FISTULA CREATION  2/3/2015    Performed by Shabbir Ardon M.D. at [facility]   • AV FISTULA CREATION  11/14/2014    Performed by Shabbir Ardon M.D. at [facility]   • AV FISTULA CREATION  9/9/2014    Performed by Shabbir Ardon M.D. at SURGERY [facility]   • CATH PLACEMENT  9/9/2014    Performed by Shabbir Ardon M.D. at [facility]   • OTHER  5/2011    tracheostomy   • GASTROSCOPY-ENDO  4/27/2011    Performed by PALMIRA DOAN at ENDOSCOPY [facility]   • COLONOSCOPY WITH BIOPSY  4/20/2011    Performed by ALCIRA CRUZ at [facility]   • GASTROSCOPY-ENDO  4/18/2011    Performed by ALCIRA CRUZ at ENDOSCOPY [facility]   • GASTROSCOPY-ENDO  4/10/2011    Performed by SYED JURADO at ENDOSCOPY [facility]   • SCLEROTHERAPHY  4/10/2011    Performed by SYED JURADO at ENDOSCOPY [facility]   • OTHER ABDOMINAL SURGERY      kidney biopsy   • TRACHEOSTOMY         Family history   Denies neuromuscular disease  Family History   Problem Relation Age of Onset   • Heart Disease Mother    • Cancer Father        Medications:   Outpatient Medications Marked as Taking for the 1/16/20 encounter (Office Visit) with Quinn Chandler M.D.   Medication Sig Dispense Refill   • [START  ON 2/13/2020] pregabalin (LYRICA) 50 MG capsule Take 1 Cap by mouth 3 times a day for 30 days. 90 Cap 0   • Oxycodone HCl 20 MG Tab Take 1 Tab by mouth 4 times a day as needed for up to 28 days. 108 Tab 0   • [START ON 2/13/2020] Oxycodone HCl 20 MG Tab Take 1 Tab by mouth 4 times a day as needed (pain) for up to 28 days. 108 Tab 0   • promethazine (PHENERGAN) 25 MG Tab TAKE 1 TABLET BY MOUTH EVERY 8 HOURS AS NEEDED FOR NAUSEA OR VOMITING 60 Tab 1   • amLODIPine (NORVASC) 10 MG Tab Take 1 Tab by mouth 1 time daily as needed (If blood pressure is > 170). 90 Tab 1   • valACYclovir (VALTREX) 500 MG Tab Take 1 Tab by mouth 2 times a day as needed (cold sores). 60 Tab 1   • Cholecalciferol (VITAMIN D3) 46831 UNIT Cap Take 10,000 Units by mouth every day.     • lidocaine (LIDODERM) 5 % Patch Apply 1 Patch to skin as directed as needed (For back pain). On for 12 hours off for 12 hours      • docusate sodium (COLACE) 100 MG Cap Take 100 mg by mouth every day.     • tizanidine (ZANAFLEX) 4 MG Tab Take 8 mg by mouth every bedtime.     • traZODone (DESYREL) 50 MG Tab Take 1 Tab by mouth every bedtime. 90 Tab 3   • omeprazole (PRILOSEC) 20 MG delayed-release capsule Take 1 Cap by mouth every day. 30 Cap 3   • ferrous sulfate 325 (65 FE) MG tablet Take 325 mg by mouth every day.     • ascorbic acid (ASCORBIC ACID) 500 MG Tab Take 500 mg by mouth every day.     • hydroxychloroquine (PLAQUENIL) 200 MG Tab Take 200 mg by mouth every bedtime.         Allergies:   Allergies   Allergen Reactions   • Lorazepam [Ativan] Anxiety     Patient becomes severely paranoid and agitated   • Morphine Itching     Tolerates Dilaudid   • Seasonal Runny Nose and Itching     Hay fever, sabiha brush       Social Hx:   Social History     Socioeconomic History   • Marital status: Single     Spouse name: Not on file   • Number of children: Not on file   • Years of education: Not on file   • Highest education level: Not on file   Occupational History   • Not  "on file   Social Needs   • Financial resource strain: Not on file   • Food insecurity:     Worry: Not on file     Inability: Not on file   • Transportation needs:     Medical: Not on file     Non-medical: Not on file   Tobacco Use   • Smoking status: Former Smoker     Packs/day: 0.50     Years: 18.00     Pack years: 9.00     Types: Cigarettes     Start date:      Last attempt to quit: 2011     Years since quittin.6   • Smokeless tobacco: Never Used   • Tobacco comment: started at 15   Substance and Sexual Activity   • Alcohol use: No     Alcohol/week: 0.0 oz     Frequency: Never     Binge frequency: Never   • Drug use: No     Types: Marijuana   • Sexual activity: Not Currently     Partners: Male   Lifestyle   • Physical activity:     Days per week: Not on file     Minutes per session: Not on file   • Stress: Not on file   Relationships   • Social connections:     Talks on phone: Not on file     Gets together: Not on file     Attends Nondenominational service: Not on file     Active member of club or organization: Not on file     Attends meetings of clubs or organizations: Not on file     Relationship status: Not on file   • Intimate partner violence:     Fear of current or ex partner: Not on file     Emotionally abused: Not on file     Physically abused: Not on file     Forced sexual activity: Not on file   Other Topics Concern   •  Service No   • Blood Transfusions Yes   • Caffeine Concern No   • Occupational Exposure No   • Hobby Hazards No   • Sleep Concern Yes   • Stress Concern Yes   • Weight Concern Yes   • Special Diet Yes   • Back Care No   • Exercise Yes   • Bike Helmet No   • Seat Belt Yes   • Self-Exams Yes   Social History Narrative   • Not on file       EXAMINATION     Physical Exam:   Vitals: /90 (BP Location: Left arm, Patient Position: Sitting, BP Cuff Size: Large adult long)   Pulse 88   Temp 36.2 °C (97.2 °F) (Temporal)   Ht 1.651 m (5' 5\")   Wt 83.7 kg (184 lb 8.4 oz)   " SpO2 95%     Constitutional:   Body Habitus: Body mass index is 30.71 kg/m².  Cooperation: Fully cooperates with exam  Appearance: Well-groomed no disheveled, in no acute distress  Respiratory-  breathing comfortable on room air, no wheezing.  Psychiatric- alert and oriented ×3. Normal affect.   Spine:   Tenderness to palpation over the thoracic and lumbar paraspinals bilaterally, trapezius bilateral as well  Gait slow, steady without loss of balance.  No use of assist device.   No pain with ROM of the left hip.  No skin lesion over the region of injection  Pain with ROM of the right hip.      MEDICAL DECISION MAKING    DATA    Labs:     UDS:   08/22/2019 Oxycodone and metabolites (patient does not have a script for phenergan with codeine and this was negative) Therefore, consistent with medications  06/13/2019 Negative for oxycodone, positive for other oxycodone metabolites  04/16/2019 Positive for oxymorphone  01/17/2019 Oxycodone and metabolites as expected  10/02/2018 Oxycodone and metabolites, also fentanyl    01/09/2019: Hep B surface ag negative  01/08/2019: GFR 7; BUN 23 Cr 6.68, Plt 160    12/15/2018 CRP 3.03    GFR 08/21/2019, 4  08/21/2019 WBC 4.6, Hb 10.1, Hct 31.6, Plt 156    BMP 12/16/2018  Na 136, Potassium 4.4, chloride 96, CO2 23 Glucose 83, BUN 55H, Cr 9.44H, Calcium 9.9, Anion gap 17.0H    CBC 3.3, Hb 9.6, Hct 31.3, Plt 115    Lab Results   Component Value Date/Time    HBA1C 4.9 06/07/2018 02:29 AM        Imaging:   I reviewed the following radiology reports reviewed  GAYATHRI 08/20/2019  Echocardiography Laboratory  CONCLUSIONS  LV EF    65%.  Structurally normal tricuspid valve.  Trace tricuspid regurgitation.  Port catheter noted in the SVC and right atrium.  Tip support appears bright, no obvious vegetation.  The tip of the port abuts the tricuspid valve.  Recommend the port be pulled back approximately 2 cm.  No evidence of endocarditis.    Xray hip with pelvis-left 07/28/2019  Left femoral head  heterogeneous density is compatible with known diagnosis of avascular necrosis. There is no joint space narrowing or spurring    Right arthroplasty without evident complication.    Xray left foot 01/21/2019  Nondisplaced transverse fracture through the base of the fifth metatarsal.    MRI lumbar spine 1/1/19  1.  Diffusely decreased signal again seen throughout the marrow space consistent with red marrow conversion, likely secondary to chronic anemia.  2.  No significant disc bulging, central canal narrowing, or foraminal narrowing.  3.  No lumbar spine fracture or subluxation.    MRI brain 12/13/2018  1.  There is no hippocampal sclerosis.  2.  There is no evidence of cortical dysplasia or neuronal migrational abnormalities.  3.  A few nonspecific T2 hyperintensities in the supratentorial white matter likely representing nonspecific foci of gliosis, chronic ischemia and demyelination. This is unchanged since the previous MRI dated 2/23/2012.       Chest xray 06/07/2018: Minimal right-sided opacities as described above, suggesting pneumonia.               Results for orders placed during the hospital encounter of 07/19/17   MR-LUMBAR SPINE-W/O    Impression 1.  Diffuse decreased bone marrow signal intensity throughout the lumbar spine and sacrum, presumably secondary to chronic anemia.    2.  Otherwise unremarkable MRI scan of the lumbar spine without contrast.                                    DIAGNOSIS   Visit Diagnoses     ICD-10-CM   1. Myalgia M79.10   2. Muscle spasm M62.838   3. Avascular necrosis of bone of hip, left ([facility]) M87.052   4. Peripheral polyneuropathy ([facility]) G62.9   5. ESRD (end stage renal disease) on dialysis ([facility]) N18.6    Z99.2   6. Status post total replacement of right hip Z96.641   7. Chronic, continuous use of opioids F11.90   8. Peripheral polyneuropathy G62.9   9. Chronic bilateral low back pain without sciatica M54.5    G89.29         ASSESSMENT and PLAN:     Debby Carrizales 39  y.o. Female returns for follow-up of her chronic pain related to lupus, AVN hips, low back and peripheral neuropathy    Debby was seen today for follow-up.    Diagnoses and all orders for this visit:    Myalgia    Muscle spasm    Avascular necrosis of bone of hip, left (HCC)  -     Oxycodone HCl 20 MG Tab; Take 1 Tab by mouth 4 times a day as needed for up to 28 days.  -     Oxycodone HCl 20 MG Tab; Take 1 Tab by mouth 4 times a day as needed (pain) for up to 28 days.  -     Consent for Opiate Prescription  -     Controlled Substance Treatment Agreement    Peripheral polyneuropathy (HCC)  -     pregabalin (LYRICA) 50 MG capsule; Take 1 Cap by mouth 3 times a day for 30 days.  -     Oxycodone HCl 20 MG Tab; Take 1 Tab by mouth 4 times a day as needed for up to 28 days.  -     Oxycodone HCl 20 MG Tab; Take 1 Tab by mouth 4 times a day as needed (pain) for up to 28 days.  -     Consent for Opiate Prescription  -     Controlled Substance Treatment Agreement    ESRD (end stage renal disease) on dialysis (HCC)    Status post total replacement of right hip    Chronic, continuous use of opioids    Peripheral polyneuropathy  -     pregabalin (LYRICA) 50 MG capsule; Take 1 Cap by mouth 3 times a day for 30 days.  -     Oxycodone HCl 20 MG Tab; Take 1 Tab by mouth 4 times a day as needed for up to 28 days.  -     Oxycodone HCl 20 MG Tab; Take 1 Tab by mouth 4 times a day as needed (pain) for up to 28 days.  -     Consent for Opiate Prescription  -     Controlled Substance Treatment Agreement    Chronic bilateral low back pain without sciatica  -     Oxycodone HCl 20 MG Tab; Take 1 Tab by mouth 4 times a day as needed for up to 28 days.  -     Oxycodone HCl 20 MG Tab; Take 1 Tab by mouth 4 times a day as needed (pain) for up to 28 days.  -     Consent for Opiate Prescription  -     Controlled Substance Treatment Agreement         1. Continue oxycodone.  Script given for this month and next.  reviewed.  2. Follow-up with  "surgeon regarding right hip pain.  3. Plan for trigger point injections today, see separate note. The risks benefits and alternatives to this procedure were discussed and the patient wishes to proceed with the procedure. Risks include but are not limited to damage to surrounding structures, infection, bleeding, worsening of pain which can be permanent, weakness which can be permanent. Benefits include pain relief, improved function. Alternatives includes not doing the procedure.    4. Encouraged Debby to follow-up with Vascular surgery via PCP regarding making plans to manage venous catheter found to be pushing into the tricuspid valve.  5. Continue with lowering of lyrica dose.  Plan to decrease as she tolerates.      In prescribing controlled substances to this patient, I certify that I have obtained and reviewed the medical history of Debby Carrizales. I have also made a good ly effort to obtain applicable records from other providers who have treated the patient and records demonstrating the following: report of \"drug-seeking behavior,\" although I suspect that she has organic reasons for pain and will continue to monitor as appropriate.     I have conducted a physical exam and documented it. I have reviewed Ms. Carrizales’s prescription history as maintained by the [place] Prescription Monitoring Program.     I have assessed the patient’s risk for abuse, dependency, and addiction using the validated Opioid Risk Tool available at https://www.mdcalc.com/eolxhu-jsyb-aunk-ort-narcotic-abuse.     Given the above, I believe the benefits of controlled substance therapy outweigh the risks. The reasons for prescribing controlled substances include non-narcotic, oral analgesic alternatives have been inadequate for pain control and in my professional opinion, controlled substances are the only reasonable choice for this patient because she has limitations to medication choices due to being on dialysis.   " Accordingly, I have discussed the risk and benefits, treatment plan, and alternative therapies with the patient.       Follow up: 4 weeks       Please note that this dictation was created using voice recognition software. I have made every reasonable attempt to correct obvious errors but there may be errors of grammar and content that I may have overlooked prior to finalization of this note.      Quinn Chandler MD  Interventional Spine and Sports Physiatry  Physical Medicine and Rehabilitation  [facility]

## 2020-01-22 NOTE — PROCEDURES
Date of Service: 1/16/2020    Physician/s: Quinn Chandler MD    Pre-operative Diagnosis: Myalgia (M79.1) , Muscle spasm (M62.838)    Post-operative Diagnosis: Myalgia (M79.1), Muscle spasm (M62.838)    Procedure: Right and left trapezius, right and left thoracic trigger point injections    Description of procedure:    The risks, benefits, and alternatives of the procedure were reviewed and discussed with the patient.  Written informed consent was freely obtained. A pre-procedural time-out was conducted by the physician verifying patient’s identity, procedure to be performed, procedure site and side, and allergy verification. Appropriate equipment was determined to be in place for the procedure.     In the office suite exam room the patient was placed in a prone position and the skin areas for injection over the left and right trapezius in one location each and three locations of the thoracic paraspinals bilaterally were marked.  The areas of pain was then prepped and draped in the usual sterile fashion. A 27g needle was placed into each of the markings at the areas above.. After negative aspiration, approximately a 1 mL of a solution containing 5 ml of 1% lidocaine and 5 mL of sterile saline was injected into the each areas above. The needle was removed intact after each trigger point injection, and the patient's back was covered with a 4x4 gauze, the area was cleansed with sterile normal saline, and a dressing was applied. There were no complications noted.     She noted improvement in her pain prior to discharge.  Plan to reassess at follow-up.    Quinn Chandler MD

## 2020-01-31 ENCOUNTER — APPOINTMENT (OUTPATIENT)
Dept: MEDICAL GROUP | Facility: MEDICAL CENTER | Age: 40
End: 2020-01-31
Payer: MEDICARE

## 2020-02-14 RX ORDER — AMLODIPINE BESYLATE 10 MG/1
10 TABLET ORAL
Qty: 90 TAB | Refills: 2 | Status: SHIPPED | OUTPATIENT
Start: 2020-02-14 | End: 2020-05-19 | Stop reason: SDUPTHER

## 2020-02-15 NOTE — TELEPHONE ENCOUNTER
Received request via: Patient    Was the patient seen in the last year in this department? Yes

## 2020-03-11 RX ORDER — PROMETHAZINE HYDROCHLORIDE 25 MG/1
25 TABLET ORAL EVERY 8 HOURS PRN
Qty: 60 TAB | Refills: 1 | Status: SHIPPED | OUTPATIENT
Start: 2020-03-11 | End: 2020-05-07

## 2020-03-12 ENCOUNTER — OFFICE VISIT (OUTPATIENT)
Dept: PHYSICAL MEDICINE AND REHAB | Facility: MEDICAL CENTER | Age: 40
End: 2020-03-12
Payer: MEDICARE

## 2020-03-12 VITALS
TEMPERATURE: 98.9 F | HEART RATE: 89 BPM | OXYGEN SATURATION: 95 % | WEIGHT: 189.6 LBS | DIASTOLIC BLOOD PRESSURE: 98 MMHG | SYSTOLIC BLOOD PRESSURE: 130 MMHG | HEIGHT: 65 IN | BODY MASS INDEX: 31.59 KG/M2

## 2020-03-12 DIAGNOSIS — M54.41 MIDLINE LOW BACK PAIN WITH RIGHT-SIDED SCIATICA, UNSPECIFIED CHRONICITY: ICD-10-CM

## 2020-03-12 DIAGNOSIS — M79.10 MYALGIA: ICD-10-CM

## 2020-03-12 DIAGNOSIS — Z96.641 STATUS POST TOTAL REPLACEMENT OF RIGHT HIP: ICD-10-CM

## 2020-03-12 DIAGNOSIS — F11.90 CHRONIC, CONTINUOUS USE OF OPIOIDS: ICD-10-CM

## 2020-03-12 DIAGNOSIS — G89.29 CHRONIC BILATERAL LOW BACK PAIN WITHOUT SCIATICA: ICD-10-CM

## 2020-03-12 DIAGNOSIS — M87.052 AVASCULAR NECROSIS OF BONE OF HIP, LEFT (HCC): ICD-10-CM

## 2020-03-12 DIAGNOSIS — M54.50 CHRONIC BILATERAL LOW BACK PAIN WITHOUT SCIATICA: ICD-10-CM

## 2020-03-12 DIAGNOSIS — M62.838 MUSCLE SPASM: ICD-10-CM

## 2020-03-12 DIAGNOSIS — N18.6 ESRD (END STAGE RENAL DISEASE) ON DIALYSIS (HCC): ICD-10-CM

## 2020-03-12 DIAGNOSIS — Z99.2 ESRD (END STAGE RENAL DISEASE) ON DIALYSIS (HCC): ICD-10-CM

## 2020-03-12 DIAGNOSIS — M79.7 FIBROMYALGIA: ICD-10-CM

## 2020-03-12 DIAGNOSIS — G62.9 PERIPHERAL POLYNEUROPATHY: ICD-10-CM

## 2020-03-12 PROCEDURE — 99214 OFFICE O/P EST MOD 30 MIN: CPT | Performed by: PHYSICAL MEDICINE & REHABILITATION

## 2020-03-12 RX ORDER — NALOXONE HYDROCHLORIDE 4 MG/.1ML
1 SPRAY NASAL PRN
Status: ON HOLD | DISCHARGE
Start: 2020-03-12 | End: 2020-12-08

## 2020-03-12 RX ORDER — PREGABALIN 50 MG/1
50 CAPSULE ORAL 3 TIMES DAILY
Qty: 84 CAP | Refills: 2 | Status: SHIPPED | OUTPATIENT
Start: 2020-03-12 | End: 2020-04-09

## 2020-03-12 RX ORDER — OXYCODONE HYDROCHLORIDE 20 MG/1
1 TABLET ORAL 4 TIMES DAILY PRN
Qty: 108 TAB | Refills: 0 | Status: SHIPPED | OUTPATIENT
Start: 2020-05-07 | End: 2020-05-29 | Stop reason: SDUPTHER

## 2020-03-12 RX ORDER — OXYCODONE HYDROCHLORIDE 20 MG/1
1 TABLET ORAL 4 TIMES DAILY PRN
Qty: 108 TAB | Refills: 0 | Status: SHIPPED | OUTPATIENT
Start: 2020-04-09 | End: 2020-05-07

## 2020-03-12 RX ORDER — OXYCODONE HYDROCHLORIDE 20 MG/1
1 TABLET ORAL 4 TIMES DAILY PRN
Qty: 108 TAB | Refills: 0 | Status: SHIPPED | OUTPATIENT
Start: 2020-03-12 | End: 2020-04-09

## 2020-03-12 ASSESSMENT — PAIN SCALES - GENERAL: PAINLEVEL: 6=MODERATE PAIN

## 2020-03-12 ASSESSMENT — FIBROSIS 4 INDEX: FIB4 SCORE: 1.027777777777777778

## 2020-03-12 ASSESSMENT — PATIENT HEALTH QUESTIONNAIRE - PHQ9: CLINICAL INTERPRETATION OF PHQ2 SCORE: 0

## 2020-03-12 NOTE — DISCHARGE INSTRUCTIONS
GO GET YOUR DIALYSIS TODAY. Return if you have new or different chest pain, shortness of breath, cough up blood or pass out.   
12-Mar-2020 09:16

## 2020-03-12 NOTE — PROGRESS NOTES
Follow up patient note  Pain Medicine, Interventional spine and sports physiatry, Physical medicine rehabilitation      Chief complaint:   Diffuse joint and body pain, low back, hips and legs      HISTORY      HPI  Patient identification/Interval history:   Debby Carrizales 39 y.o. female who has chronic pain related to rheumatologic disease, peripheral neuropathy and AVN hips, lupus.      Since the last visit, Debby reports that she did well after the trigger point injections at the last visit.  This was pretty helpful for about 6 weeks.  It has started to return in the last week or two.  Still, her pain has been a little bit better.  Pain is a 6-7/10 on the VAS.    She has been tolerating dialysis, she still has some trouble with pain.  Her back pain has been starting to bother her there again.  She tries ice, heat, pillows to help.  Pain medications are somewhat helpful and she has been taking this after dialysis and typically one hour prior to diaylsis.  This has helped.    The left hip still feels better after intra-articular hip injection on 11/27/2019.  She has follow-up with Dr. Holman in about 3 weeks regarding pain in the right hip.    Reducing the lyrica to 50mg po tid has been okay.  Most of the time, she thinks that this is okay.  Sometimes, she gets itchy and wonders if this is related.  Overall, this has been fine.     She is/was(?) on the [facility] transplant list, no new news on this      ORT: 3  PHQ-9:0    Review of records:   Hospital discharge report noted from 08/12/2019-08/21/2019 admission.  She was admitted for a near syncopal episode.  Cardiac evaluation was suspicious for endocarditis and she was started on empiric IV abx.  GAYATHRI demonstrate no endocarditis, but did show that her venous catheter was pushing through the tricuspid valve.  Cultures were negative.  Dr. Jansen, vascular surgery, was consulted and they discussed follow-up plans for port management and this will be planned  after discharge.    ROS   Unchanged from 01/16/2020 except as noted in HPI     Red Flags :   Chills, Sweats:No fevers, chills and night sweats.  No fevers lately  Involuntary Weight Loss: Denies  Bowel/Bladder Incontinence: Denies  Saddle Anesthesia: Denies    PMHx:   Past Medical History:   Diagnosis Date   • Arthritis     all joints,r/t lupus   • Avascular necrosis of bones of both hips ([facility]) 10/10/2016   • Clostridium difficile colitis 5/3/2011   • Dialysis patient    • ESBL (extended spectrum beta-lactamase) producing bacteria infection 8/25/2014   • Fibromyalgia    • Hypertension    • Lupus ([facility])    • Pneumonia    • Psychiatric disorder     anxiety, depression   • Pyelonephritis 11/21/2017   • Renal failure    • Sepsis due to pneumonia ([facility]) 6/7/2018   • Status epilepticus ([facility]) 12/13/2018       PSHx:   Past Surgical History:   Procedure Laterality Date   • GAYATHRI N/A 8/20/2019    Procedure: ECHOCARDIOGRAM, TRANSESOPHAGEAL;  Surgeon: Marta Elaine M.D.;  Location: [facility];  Service: Cardiac   • AV FISTULA REVISION Right 8/11/2018    Procedure: AV FISTULA REVISION- Graft and Thrombectomy;  Surgeon: Shabbir Ardon M.D.;  Location: [facility];  Service: General   • AV FISTULA THROMBOLYSIS Right 8/11/2018    Procedure: AV FISTULA THROMBOLYSIS;  Surgeon: Shabbir Ardon M.D.;  Location: [facility];  Service: General   • AV FISTULA CREATION Right 12/9/2017    Procedure: AV FISTULA CREATION-ARM FOR GRAFT;  Surgeon: Lesly Jansen M.D.;  Location: [facility];  Service: General   • THROMBECTOMY  12/9/2017    Procedure: THROMBECTOMY;  Surgeon: Lesly Jansen M.D.;  Location: [facility];  Service: General   • THROMBECTOMY Right 8/21/2016    Procedure: THROMBECTOMY - right AV fistula graft with grams;  Surgeon: Shabbir Ardon M.D.;  Location: [facility];  Service:    • ANGIOPLASTY BALLOON  8/21/2016    Procedure:  ANGIOPLASTY BALLOON;  Surgeon: Shabbir Ardon M.D.;  Location: [facility];  Service:    • THROMBECTOMY Right 8/20/2016    Procedure: THROMBECTOMY AV GRAFT;  Surgeon: Shabbir Ardon M.D.;  Location: SUR[facility];  Service:    • AV FISTULA CREATION Right 7/12/2016    Procedure: AV FISTULA CREATION WITH GRAFT BRACHIAL AXILLARY;  Surgeon: Shabbir Ardon M.D.;  Location: SUR[facility]ERY [facility];  Service:    • CLOSED REDUCTION Right 7/5/2016    Procedure: CLOSED REDUCTION- Hip ;  Surgeon: Michael Holman M.D.;  Location: SUR[facility]ERY [facility];  Service:    • HIP ARTHROPLASTY TOTAL Right 1/18/2016    Procedure: HIP ARTHROPLASTY TOTAL;  Surgeon: Michael Holman M.D.;  Location: [facility];  Service:    • AV FISTULA CREATION  2/3/2015    Performed by Shabbir Ardon M.D. at [facility]   • AV FISTULA CREATION  11/14/2014    Performed by Shabbir Ardon M.D. at [facility]   • AV FISTULA CREATION  9/9/2014    Performed by Shabbir Ardon M.D. at [facility]   • CATH PLACEMENT  9/9/2014    Performed by Shabbir Ardon M.D. at [facility]   • OTHER  5/2011    tracheostomy   • GASTROSCOPY-ENDO  4/27/2011    Performed by PALMIRA DOAN at [facility]   • COLONOSCOPY WITH BIOPSY  4/20/2011    Performed by ALCIRA CRUZ at [facility]   • GASTROSCOPY-ENDO  4/18/2011    Performed by ALCIRA CRUZ at [facility]   • GASTROSCOPY-ENDO  4/10/2011    Performed by SYED JURADO at [facility]   • SCLEROTHERAPHY  4/10/2011    Performed by SYED JURADO at [facility]   • OTHER ABDOMINAL SURGERY      kidney biopsy   • TRACHEOSTOMY         Family history   Denies neuromuscular disease  Family History   Problem Relation Age of Onset   • Heart Disease Mother    • Cancer Father        Medications:   Outpatient Medications Marked as Taking for  the 3/12/20 encounter (Office Visit) with Quinn Chandler M.D.   Medication Sig Dispense Refill   • Oxycodone HCl 20 MG Tab Take 1 Tab by mouth 4 times a day as needed (pain) for up to 28 days. 108 Tab 0   • [START ON 4/9/2020] Oxycodone HCl 20 MG Tab Take 1 Tab by mouth 4 times a day as needed for up to 28 days. 108 Tab 0   • pregabalin (LYRICA) 50 MG capsule Take 1 Cap by mouth 3 times a day for 28 days. 84 Cap 2   • [START ON 5/7/2020] Oxycodone HCl 20 MG Tab Take 1 Tab by mouth 4 times a day as needed (pain) for up to 28 days. 108 Tab 0   • Naloxone (NARCAN) 4 MG/0.1ML Liquid Spray 4 mg in nose as needed (For severe sleepiness or difficulty breathing from possible overdose. Call 911 after administration.).     • promethazine (PHENERGAN) 25 MG Tab Take 1 Tab by mouth every 8 hours as needed for Nausea/Vomiting. 60 Tab 1   • amLODIPine (NORVASC) 10 MG Tab TAKE 1 TAB BY MOUTH 1 TIME DAILY AS NEEDED (IF BLOOD PRESSURE IS > 170). 90 Tab 2   • VELPHORO 500 MG Chew Tab TK 2 TS PO TID WITH EACH MEALS     • pregabalin (LYRICA) 50 MG capsule Take 1 Cap by mouth 3 times a day for 30 days. 90 Cap 0   • valACYclovir (VALTREX) 500 MG Tab Take 1 Tab by mouth 2 times a day as needed (cold sores). 60 Tab 1   • Cholecalciferol (VITAMIN D3) 22506 UNIT Cap Take 10,000 Units by mouth every day.     • lidocaine (LIDODERM) 5 % Patch Apply 1 Patch to skin as directed as needed (For back pain). On for 12 hours off for 12 hours      • docusate sodium (COLACE) 100 MG Cap Take 100 mg by mouth every day.     • tizanidine (ZANAFLEX) 4 MG Tab Take 8 mg by mouth every bedtime.     • traZODone (DESYREL) 50 MG Tab Take 1 Tab by mouth every bedtime. 90 Tab 3   • omeprazole (PRILOSEC) 20 MG delayed-release capsule Take 1 Cap by mouth every day. 30 Cap 3   • ferrous sulfate 325 (65 FE) MG tablet Take 325 mg by mouth every day.     • ascorbic acid (ASCORBIC ACID) 500 MG Tab Take 500 mg by mouth every day.     • hydroxychloroquine (PLAQUENIL) 200 MG  Tab Take 200 mg by mouth every bedtime.         Allergies:   Allergies   Allergen Reactions   • Lorazepam [Ativan] Anxiety     Patient becomes severely paranoid and agitated   • Morphine Itching     Tolerates Dilaudid   • Seasonal Runny Nose and Itching     Hay fever, sabiha brush       Social Hx:   Social History     Socioeconomic History   • Marital status: Single     Spouse name: Not on file   • Number of children: Not on file   • Years of education: Not on file   • Highest education level: Not on file   Occupational History   • Not on file   Social Needs   • Financial resource strain: Not on file   • Food insecurity     Worry: Not on file     Inability: Not on file   • Transportation needs     Medical: Not on file     Non-medical: Not on file   Tobacco Use   • Smoking status: Former Smoker     Packs/day: 0.50     Years: 18.00     Pack years: 9.00     Types: Cigarettes     Start date:      Last attempt to quit: 2011     Years since quittin.7   • Smokeless tobacco: Never Used   • Tobacco comment: started at 15   Substance and Sexual Activity   • Alcohol use: No     Alcohol/week: 0.0 oz     Frequency: Never     Binge frequency: Never   • Drug use: No     Types: Marijuana   • Sexual activity: Not Currently     Partners: Male   Lifestyle   • Physical activity     Days per week: Not on file     Minutes per session: Not on file   • Stress: Not on file   Relationships   • Social connections     Talks on phone: Not on file     Gets together: Not on file     Attends Rastafarian service: Not on file     Active member of club or organization: Not on file     Attends meetings of clubs or organizations: Not on file     Relationship status: Not on file   • Intimate partner violence     Fear of current or ex partner: Not on file     Emotionally abused: Not on file     Physically abused: Not on file     Forced sexual activity: Not on file   Other Topics Concern   •  Service No   • Blood Transfusions Yes   •  "Caffeine Concern No   • Occupational Exposure No   • Hobby Hazards No   • Sleep Concern Yes   • Stress Concern Yes   • Weight Concern Yes   • Special Diet Yes   • Back Care No   • Exercise Yes   • Bike Helmet No   • Seat Belt Yes   • Self-Exams Yes   Social History Narrative   • Not on file       EXAMINATION     Physical Exam:   Vitals: /98 (BP Location: Left arm, Patient Position: Sitting, BP Cuff Size: Adult long)   Pulse 89   Temp 37.2 °C (98.9 °F) (Temporal)   Ht 1.651 m (5' 5\")   Wt 86 kg (189 lb 9.5 oz)   SpO2 95%     Constitutional:   Body Habitus: Body mass index is 31.55 kg/m².  Cooperation: Fully cooperates with exam  Appearance: Well-groomed no disheveled, in no acute distress  Respiratory-  breathing comfortable on room air, no wheezing.  Psychiatric- alert and oriented ×3. Normal affect.   Spine:   Tenderness to palpation over the cervical, thoracic and lumbar paraspinals bilaterally, trapezius bilateral as well  Gait slow, steady without loss of balance.  No use of assist device.   No pain with ROM of the left hip.  No skin lesion over the region of injection  Pain with ROM of the right hip.      MEDICAL DECISION MAKING    DATA    Labs:     UDS:  12/19/2019 oxycodone and metabolites   08/22/2019 Oxycodone and metabolites (patient does not have a script for phenergan with codeine and this was negative) Therefore, consistent with medications  06/13/2019 Negative for oxycodone, positive for other oxycodone metabolites  04/16/2019 Positive for oxymorphone  01/17/2019 Oxycodone and metabolites as expected  10/02/2018 Oxycodone and metabolites, also fentanyl    01/09/2019: Hep B surface ag negative  01/08/2019: GFR 7; BUN 23 Cr 6.68, Plt 160    12/15/2018 CRP 3.03    GFR 08/21/2019, 4  08/21/2019 WBC 4.6, Hb 10.1, Hct 31.6, Plt 156    BMP 12/16/2018  Na 136, Potassium 4.4, chloride 96, CO2 23 Glucose 83, BUN 55H, Cr 9.44H, Calcium 9.9, Anion gap 17.0H    CBC 3.3, Hb 9.6, Hct 31.3, Plt 115    Lab " Results   Component Value Date/Time    HBA1C 4.9 06/07/2018 02:29 AM        Imaging:   I reviewed the following radiology reports reviewed  GAYATHRI 08/20/2019  Echocardiography Laboratory  CONCLUSIONS  LV EF    65%.  Structurally normal tricuspid valve.  Trace tricuspid regurgitation.  Port catheter noted in the SVC and right atrium.  Tip support appears bright, no obvious vegetation.  The tip of the port abuts the tricuspid valve.  Recommend the port be pulled back approximately 2 cm.  No evidence of endocarditis.    Xray hip with pelvis-left 07/28/2019  Left femoral head heterogeneous density is compatible with known diagnosis of avascular necrosis. There is no joint space narrowing or spurring    Right arthroplasty without evident complication.    Xray left foot 01/21/2019  Nondisplaced transverse fracture through the base of the fifth metatarsal.    MRI lumbar spine 1/1/19  1.  Diffusely decreased signal again seen throughout the marrow space consistent with red marrow conversion, likely secondary to chronic anemia.  2.  No significant disc bulging, central canal narrowing, or foraminal narrowing.  3.  No lumbar spine fracture or subluxation.    MRI brain 12/13/2018  1.  There is no hippocampal sclerosis.  2.  There is no evidence of cortical dysplasia or neuronal migrational abnormalities.  3.  A few nonspecific T2 hyperintensities in the supratentorial white matter likely representing nonspecific foci of gliosis, chronic ischemia and demyelination. This is unchanged since the previous MRI dated 2/23/2012.       Chest xray 06/07/2018: Minimal right-sided opacities as described above, suggesting pneumonia.               Results for orders placed during the hospital encounter of 07/19/17   MR-LUMBAR SPINE-W/O    Impression 1.  Diffuse decreased bone marrow signal intensity throughout the lumbar spine and sacrum, presumably secondary to chronic anemia.    2.  Otherwise unremarkable MRI scan of the lumbar spine without  contrast.                                    DIAGNOSIS   Visit Diagnoses     ICD-10-CM   1. Avascular necrosis of bone of hip, left (HCC) M87.052   2. Status post total replacement of right hip Z96.641   3. Midline low back pain with right-sided sciatica, unspecified chronicity M54.41   4. Peripheral polyneuropathy (HCC) G62.9   5. ESRD (end stage renal disease) on dialysis ([facility]) N18.6    Z99.2   6. Fibromyalgia M79.7   7. Peripheral polyneuropathy G62.9   8. Chronic bilateral low back pain without sciatica M54.5    G89.29   9. Chronic, continuous use of opioids F11.90   10. Myalgia M79.10   11. Muscle spasm M62.838         ASSESSMENT and PLAN:     Debby Carrizales 39 y.o. Female returns for follow-up of her chronic pain related to lupus, AVN hips, low back and peripheral neuropathy    Debby was seen today for follow-up.    Diagnoses and all orders for this visit:    Avascular necrosis of bone of hip, left (HCC)  -     Oxycodone HCl 20 MG Tab; Take 1 Tab by mouth 4 times a day as needed (pain) for up to 28 days.  -     Oxycodone HCl 20 MG Tab; Take 1 Tab by mouth 4 times a day as needed for up to 28 days.  -     Oxycodone HCl 20 MG Tab; Take 1 Tab by mouth 4 times a day as needed (pain) for up to 28 days.  -     Consent for Opiate Prescription  -     Controlled Substance Treatment Agreement  -     Naloxone (NARCAN) 4 MG/0.1ML Liquid; Spray 4 mg in nose as needed (For severe sleepiness or difficulty breathing from possible overdose. Call 911 after administration.).  -     Pain Management Screen; Future    Status post total replacement of right hip    Midline low back pain with right-sided sciatica, unspecified chronicity    Peripheral polyneuropathy (HCC)  -     Oxycodone HCl 20 MG Tab; Take 1 Tab by mouth 4 times a day as needed (pain) for up to 28 days.  -     Oxycodone HCl 20 MG Tab; Take 1 Tab by mouth 4 times a day as needed for up to 28 days.  -     pregabalin (LYRICA) 50 MG capsule; Take 1 Cap by mouth  3 times a day for 28 days.  -     Oxycodone HCl 20 MG Tab; Take 1 Tab by mouth 4 times a day as needed (pain) for up to 28 days.  -     Consent for Opiate Prescription  -     Controlled Substance Treatment Agreement  -     Naloxone (NARCAN) 4 MG/0.1ML Liquid; Spray 4 mg in nose as needed (For severe sleepiness or difficulty breathing from possible overdose. Call 911 after administration.).  -     Pain Management Screen; Future    ESRD (end stage renal disease) on dialysis (HCC)    Fibromyalgia    Peripheral polyneuropathy  -     Oxycodone HCl 20 MG Tab; Take 1 Tab by mouth 4 times a day as needed (pain) for up to 28 days.  -     Oxycodone HCl 20 MG Tab; Take 1 Tab by mouth 4 times a day as needed for up to 28 days.  -     pregabalin (LYRICA) 50 MG capsule; Take 1 Cap by mouth 3 times a day for 28 days.  -     Oxycodone HCl 20 MG Tab; Take 1 Tab by mouth 4 times a day as needed (pain) for up to 28 days.  -     Consent for Opiate Prescription  -     Controlled Substance Treatment Agreement  -     Naloxone (NARCAN) 4 MG/0.1ML Liquid; Spray 4 mg in nose as needed (For severe sleepiness or difficulty breathing from possible overdose. Call 911 after administration.).  -     Pain Management Screen; Future    Chronic bilateral low back pain without sciatica  -     Oxycodone HCl 20 MG Tab; Take 1 Tab by mouth 4 times a day as needed (pain) for up to 28 days.  -     Oxycodone HCl 20 MG Tab; Take 1 Tab by mouth 4 times a day as needed for up to 28 days.  -     Oxycodone HCl 20 MG Tab; Take 1 Tab by mouth 4 times a day as needed (pain) for up to 28 days.  -     Consent for Opiate Prescription  -     Controlled Substance Treatment Agreement  -     Naloxone (NARCAN) 4 MG/0.1ML Liquid; Spray 4 mg in nose as needed (For severe sleepiness or difficulty breathing from possible overdose. Call 911 after administration.).  -     Pain Management Screen; Future    Chronic, continuous use of opioids  -     Pain Management Screen;  "Future    Myalgia  -     REFERRAL TO PHYSIATRY (PMR)    Muscle spasm  -     REFERRAL TO PHYSIATRY (PMR)       1. Discussed possible repeat trigger point injections.  The risks benefits and alternatives to this procedure were discussed and the patient wishes to proceed with the procedure. Risks include but are not limited to damage to surrounding structures, infection, bleeding, worsening of pain which can be permanent, weakness which can be permanent. Benefits include pain relief, improved function. Alternatives includes not doing the procedure.  She would like to proceed.  2. Patient has narcan at home, filled outside.  3. Continue with oxycodone.  Scripts given through June 4,2020  4. Continue lyrica at 50mg po tid.  Will hold at this dose for now.  Scripts given through June 4, 2020.  5. She will continue to follow-up with Vascular surgery and PCP  6. Discussed possible repeat left hip injection in the future, if needed.  She will follow-up with her Orthopedic surgeon as scheduled next month.  .      In prescribing controlled substances to this patient, I certify that I have obtained and reviewed the medical history of Debby Carrizales. I have also made a good ly effort to obtain applicable records from other providers who have treated the patient and records demonstrating the following: report of \"drug-seeking behavior,\" although I suspect that she has organic reasons for pain and will continue to monitor as appropriate.     I have conducted a physical exam and documented it. I have reviewed Ms. Carrizales’s prescription history as maintained by the [place] Prescription Monitoring Program.     I have assessed the patient’s risk for abuse, dependency, and addiction using the validated Opioid Risk Tool available at https://www.mdcalc.com/sqssvn-jolh-zhkx-ort-narcotic-abuse.     Given the above, I believe the benefits of controlled substance therapy outweigh the risks. The reasons for prescribing controlled " substances include non-narcotic, oral analgesic alternatives have been inadequate for pain control and in my professional opinion, controlled substances are the only reasonable choice for this patient because she has limitations to medication choices due to being on dialysis.   Accordingly, I have discussed the risk and benefits, treatment plan, and alternative therapies with the patient.       Follow up: Prior to June 4, 2020      Please note that this dictation was created using voice recognition software. I have made every reasonable attempt to correct obvious errors but there may be errors of grammar and content that I may have overlooked prior to finalization of this note.      Quinn Chandler MD  Interventional Spine and Sports Physiatry  Physical Medicine and Rehabilitation  [facility]

## 2020-04-20 RX ORDER — TRAZODONE HYDROCHLORIDE 50 MG/1
50 TABLET ORAL
Qty: 90 TAB | Refills: 1 | Status: SHIPPED | OUTPATIENT
Start: 2020-04-20 | End: 2020-10-16 | Stop reason: SDUPTHER

## 2020-04-20 RX ORDER — TIZANIDINE 4 MG/1
4 TABLET ORAL EVERY 6 HOURS PRN
Qty: 180 TAB | Refills: 1 | Status: SHIPPED | OUTPATIENT
Start: 2020-04-20 | End: 2020-07-16

## 2020-05-07 RX ORDER — PROMETHAZINE HYDROCHLORIDE 25 MG/1
TABLET ORAL
Qty: 60 TAB | Refills: 1 | Status: SHIPPED | OUTPATIENT
Start: 2020-05-07 | End: 2020-07-01

## 2020-05-19 ENCOUNTER — TELEMEDICINE (OUTPATIENT)
Dept: MEDICAL GROUP | Facility: MEDICAL CENTER | Age: 40
End: 2020-05-19
Payer: MEDICARE

## 2020-05-19 VITALS — BODY MASS INDEX: 30.85 KG/M2 | WEIGHT: 185.19 LBS | HEIGHT: 65 IN

## 2020-05-19 DIAGNOSIS — I15.1 HYPERTENSION SECONDARY TO OTHER RENAL DISORDERS: ICD-10-CM

## 2020-05-19 DIAGNOSIS — J30.2 SEASONAL ALLERGIES: ICD-10-CM

## 2020-05-19 DIAGNOSIS — L72.0 EPIDERMAL CYST OF FACE: ICD-10-CM

## 2020-05-19 DIAGNOSIS — H92.02 LEFT EAR PAIN: ICD-10-CM

## 2020-05-19 PROBLEM — N18.6 ESRD (END STAGE RENAL DISEASE) (HCC): Chronic | Status: RESOLVED | Noted: 2017-07-19 | Resolved: 2020-05-19

## 2020-05-19 PROBLEM — G93.41 ACUTE METABOLIC ENCEPHALOPATHY: Status: RESOLVED | Noted: 2019-10-02 | Resolved: 2020-05-19

## 2020-05-19 PROBLEM — R56.9 SEIZURES (HCC): Status: RESOLVED | Noted: 2019-01-05 | Resolved: 2020-05-19

## 2020-05-19 PROCEDURE — 99214 OFFICE O/P EST MOD 30 MIN: CPT | Mod: 95,CR | Performed by: FAMILY MEDICINE

## 2020-05-19 RX ORDER — AMLODIPINE BESYLATE 10 MG/1
10 TABLET ORAL DAILY
Qty: 90 TAB | Refills: 3 | Status: SHIPPED | OUTPATIENT
Start: 2020-05-19 | End: 2021-05-12 | Stop reason: SDUPTHER

## 2020-05-19 RX ORDER — FLUTICASONE PROPIONATE 50 MCG
1 SPRAY, SUSPENSION (ML) NASAL DAILY
Qty: 16 G | Refills: 1 | Status: SHIPPED | OUTPATIENT
Start: 2020-05-19 | End: 2020-05-19

## 2020-05-19 RX ORDER — FLUTICASONE PROPIONATE 50 MCG
SPRAY, SUSPENSION (ML) NASAL
Qty: 48 G | Refills: 3 | Status: SHIPPED | OUTPATIENT
Start: 2020-05-19 | End: 2021-11-01

## 2020-05-19 RX ORDER — PREGABALIN 50 MG/1
CAPSULE ORAL
COMMUNITY
Start: 2020-05-07 | End: 2020-05-29 | Stop reason: SDUPTHER

## 2020-05-19 ASSESSMENT — FIBROSIS 4 INDEX: FIB4 SCORE: 1.027777777777777778

## 2020-05-19 NOTE — PROGRESS NOTES
Telemedicine Visit     This encounter was conducted via Zoom .   Verbal consent was obtained. Patient's identity was verified.    cc:  hypertension    Subjective:     Debby Carrizales is a 39 y.o. female presenting for:    1.  Hypertension: She reports that her blood pressures have been elevated for the past several weeks or so.  She was taking amlodipine 10 mg as needed for her blood pressures.  However, she has been taking it more regularly lately and needs refills.  She states that her blood pressures before dialysis are in the 160 systolic.  When she leaves dialysis, her blood pressure is improved to 140s.  Denies any chest pain, shortness of breath, lightheadedness, dizziness, leg swelling.  Denies any side effects from the amlodipine.    2.  Left ear pain: Has been having intermittent left ear pain for the past 2 weeks.  No associated symptoms except for seasonal allergies.  Denies any sinus congestion, cough, sore throat.  She reports subjective fevers when she goes to dialysis.  Symptoms are worse when she lays on right ear.  Nothing makes it better, has tried nothing else.  She finds that over-the-counter allergy pills make her quite drowsy.  She has not tried nasal sprays yet.    3.  Cyst: She is noticed a cyst on the left side of her face since August or so.  It intermittently drains and gets bigger at times.  She states that the hospital tried to drain it at one point, but was unable to be drained.  Denies any redness, pain, bleeding.      Review of systems:  See above.       Current Outpatient Medications:   •  pregabalin (LYRICA) 50 MG capsule, TK ONE C PO  TID FOR 28 DAYS, Disp: , Rfl:   •  amLODIPine (NORVASC) 10 MG Tab, Take 1 Tab by mouth every day., Disp: 90 Tab, Rfl: 3  •  fluticasone (FLONASE) 50 MCG/ACT nasal spray, Spray 1 Spray in nose every day., Disp: 16 g, Rfl: 1  •  promethazine (PHENERGAN) 25 MG Tab, TAKE 1 TABLET BY MOUTH EVERY 8 HOURS AS NEEDED FOR NAUSEA OR VOMITING, Disp: 60  "Tab, Rfl: 1  •  traZODone (DESYREL) 50 MG Tab, Take 1 Tab by mouth every bedtime., Disp: 90 Tab, Rfl: 1  •  tizanidine (ZANAFLEX) 4 MG Tab, Take 1 Tab by mouth every 6 hours as needed (muscle spasms)., Disp: 180 Tab, Rfl: 1  •  Oxycodone HCl 20 MG Tab, Take 1 Tab by mouth 4 times a day as needed (pain) for up to 28 days., Disp: 108 Tab, Rfl: 0  •  VELPHORO 500 MG Chew Tab, TK 2 TS PO TID WITH EACH MEALS, Disp: , Rfl:   •  lacosamide (VIMPAT) 100 MG Tab tablet, Vimpat 100 mg tablet, Disp: , Rfl:   •  valACYclovir (VALTREX) 500 MG Tab, Take 1 Tab by mouth 2 times a day as needed (cold sores)., Disp: 60 Tab, Rfl: 1  •  Cholecalciferol (VITAMIN D3) 23818 UNIT Cap, Take 10,000 Units by mouth every day., Disp: , Rfl:   •  lidocaine (LIDODERM) 5 % Patch, Apply 1 Patch to skin as directed as needed (For back pain). On for 12 hours off for 12 hours , Disp: , Rfl:   •  docusate sodium (COLACE) 100 MG Cap, Take 100 mg by mouth every day., Disp: , Rfl:   •  omeprazole (PRILOSEC) 20 MG delayed-release capsule, Take 1 Cap by mouth every day., Disp: 30 Cap, Rfl: 3  •  ferrous sulfate 325 (65 FE) MG tablet, Take 325 mg by mouth every day., Disp: , Rfl:   •  ascorbic acid (ASCORBIC ACID) 500 MG Tab, Take 500 mg by mouth every day., Disp: , Rfl:   •  hydroxychloroquine (PLAQUENIL) 200 MG Tab, Take 200 mg by mouth every bedtime., Disp: , Rfl:   •  Naloxone (NARCAN) 4 MG/0.1ML Liquid, Spray 4 mg in nose as needed (For severe sleepiness or difficulty breathing from possible overdose. Call 911 after administration.)., Disp: , Rfl:     Allergies, past medical history, past surgical history, family history, social history reviewed and updated    Objective:     Vitals obtained by patient:  Ht 1.651 m (5' 5\")   Wt 84 kg (185 lb 3 oz)   BMI 30.82 kg/m²     Physical Exam:  Constitutional: Alert, no distress, well-groomed.  Skin: No rashes in visible areas.  Eye: Round. Conjunctiva clear, lids normal. No icterus.   ENMT: Lips pink without " lesions, good dentition, moist mucous membranes. Phonation normal.  Neck: No masses. Moves freely without pain.  CV: Pulse as reported by patient  Respiratory: Unlabored respiratory effort, no cough or audible wheeze  Psych: Alert and oriented x3, normal affect and mood.       Assessment/Plan:     Debby was seen today for follow-up.    Diagnoses and all orders for this visit:    Hypertension secondary to other renal disorders  Uncontrolled.  We discussed taking her amlodipine daily and keeping track of her blood pressures at home.  She will send us an update in about 2 weeks on MyChart.  Also recommended that she discuss with her nephrologist regarding her blood pressures.  We may need to add an additional medication like clonidine  -     amLODIPine (NORVASC) 10 MG Tab; Take 1 Tab by mouth every day.    Left ear pain  Seasonal allergies  New problem, suspect that her ear pain may be due to seasonal allergies.  Recommended trying a nasal steroid spray.  -     fluticasone (FLONASE) 50 MCG/ACT nasal spray; Spray 1 Spray in nose every day.    Epidermal cyst of face  New problem, difficult to see on the video visit.  However, referral to dermatology placed.  -     REFERRAL TO DERMATOLOGY        Return if symptoms worsen or fail to improve.

## 2020-05-29 ENCOUNTER — TELEMEDICINE (OUTPATIENT)
Dept: PHYSICAL MEDICINE AND REHAB | Facility: MEDICAL CENTER | Age: 40
End: 2020-05-29
Payer: MEDICARE

## 2020-05-29 VITALS
DIASTOLIC BLOOD PRESSURE: 78 MMHG | BODY MASS INDEX: 31.22 KG/M2 | WEIGHT: 187.39 LBS | SYSTOLIC BLOOD PRESSURE: 138 MMHG | HEIGHT: 65 IN

## 2020-05-29 DIAGNOSIS — G89.29 CHRONIC BILATERAL LOW BACK PAIN WITHOUT SCIATICA: ICD-10-CM

## 2020-05-29 DIAGNOSIS — M79.7 FIBROMYALGIA: ICD-10-CM

## 2020-05-29 DIAGNOSIS — M87.052 AVASCULAR NECROSIS OF BONE OF HIP, LEFT (HCC): ICD-10-CM

## 2020-05-29 DIAGNOSIS — E55.9 VITAMIN D DEFICIENCY DISEASE: ICD-10-CM

## 2020-05-29 DIAGNOSIS — G62.9 PERIPHERAL POLYNEUROPATHY: ICD-10-CM

## 2020-05-29 DIAGNOSIS — F11.90 CHRONIC, CONTINUOUS USE OF OPIOIDS: ICD-10-CM

## 2020-05-29 DIAGNOSIS — M32.9 SYSTEMIC LUPUS ERYTHEMATOSUS, UNSPECIFIED SLE TYPE, UNSPECIFIED ORGAN INVOLVEMENT STATUS (HCC): ICD-10-CM

## 2020-05-29 DIAGNOSIS — M54.50 CHRONIC BILATERAL LOW BACK PAIN WITHOUT SCIATICA: ICD-10-CM

## 2020-05-29 DIAGNOSIS — Z96.641 STATUS POST TOTAL REPLACEMENT OF RIGHT HIP: ICD-10-CM

## 2020-05-29 PROCEDURE — 99214 OFFICE O/P EST MOD 30 MIN: CPT | Mod: 95,CR | Performed by: PHYSICAL MEDICINE & REHABILITATION

## 2020-05-29 RX ORDER — OXYCODONE HYDROCHLORIDE 20 MG/1
1 TABLET ORAL 4 TIMES DAILY PRN
Qty: 108 TAB | Refills: 0 | Status: SHIPPED | OUTPATIENT
Start: 2020-07-02 | End: 2020-07-28 | Stop reason: SDUPTHER

## 2020-05-29 RX ORDER — PREGABALIN 50 MG/1
50 CAPSULE ORAL 3 TIMES DAILY
Qty: 84 CAP | Refills: 0 | Status: SHIPPED | OUTPATIENT
Start: 2020-07-02 | End: 2020-07-28 | Stop reason: SDUPTHER

## 2020-05-29 RX ORDER — OXYCODONE HYDROCHLORIDE 20 MG/1
1 TABLET ORAL 4 TIMES DAILY PRN
Qty: 108 TAB | Refills: 0 | Status: SHIPPED | OUTPATIENT
Start: 2020-06-04 | End: 2020-07-02

## 2020-05-29 RX ORDER — PREGABALIN 50 MG/1
CAPSULE ORAL
Qty: 84 CAP | Refills: 0 | Status: SHIPPED | OUTPATIENT
Start: 2020-06-04 | End: 2020-07-02

## 2020-05-29 ASSESSMENT — FIBROSIS 4 INDEX: FIB4 SCORE: 1.027777777777777778

## 2020-05-29 ASSESSMENT — PAIN SCALES - GENERAL: PAINLEVEL: 6=MODERATE PAIN

## 2020-05-29 ASSESSMENT — PATIENT HEALTH QUESTIONNAIRE - PHQ9: CLINICAL INTERPRETATION OF PHQ2 SCORE: 0

## 2020-05-29 NOTE — PROGRESS NOTES
Telemedicine Visit: Established Patient     This encounter was conducted via Zoom .   Verbal consent was obtained. Patient's identity was verified.    Subjective:   CC:   Debby Carrizales is a 39 y.o. female presenting for evaluation and management of chronic pain related to rheumatologic disease, peripheral neuropathy, AVN bilateral hips (Right s/p ARLEEN) and leg pain.    She reports that she a week with increased right hip pain.  She did have a seizure that week.  Initially, she used her walker, but she has had improvement.  Now, she is able to walk independently again.  Heat helped more than ice.    Ice helps her back pain more.  Overall, she has been having to do this in the evenings.      No issues with medications.  She has had some mild constipation when her mobility was limited    Lyrica 50mg po tid seems to be helpful.  She has not been able to reduce to twice a day. She did try this and her back pain increased.    She gets oxycodone 20mg #108 for 28 days.  She has tried to reduce by 1/2 pill on non-dialysis days.    Review of records:  Dr. Navarro:  She was seen for hypertension, treating with amlodipine 10mg daily with plan to be checking blood pressures, left ear pain/seasonal allergies treated with flonase, epidermal cyst of face referral to Dermatology placed    ROS Unchanged from 03/17/2020, but she does report she had increased temperature one time.  This has normalized.  Denies any recent fevers or chills. No nausea or vomiting. No chest pains or shortness of breath.     Allergies   Allergen Reactions   • Lorazepam [Ativan] Anxiety     Patient becomes severely paranoid and agitated   • Morphine Itching     Tolerates Dilaudid   • Seasonal Runny Nose and Itching     Hay fever, sabiha brush       Current medicines (including changes today)  Current Outpatient Medications   Medication Sig Dispense Refill   • [START ON 6/4/2020] Oxycodone HCl 20 MG Tab Take 1 Tab by mouth 4 times a day as needed  (pain) for up to 28 days. 108 Tab 0   • [START ON 6/4/2020] pregabalin (LYRICA) 50 MG capsule TK ONE C PO  TID FOR 28 DAYS 84 Cap 0   • [START ON 7/2/2020] pregabalin (LYRICA) 50 MG capsule Take 1 Cap by mouth 3 times a day for 28 days. 84 Cap 0   • [START ON 7/2/2020] Oxycodone HCl 20 MG Tab Take 1 Tab by mouth 4 times a day as needed for up to 28 days. 108 Tab 0   • amLODIPine (NORVASC) 10 MG Tab Take 1 Tab by mouth every day. 90 Tab 3   • fluticasone (FLONASE) 50 MCG/ACT nasal spray SHAKE LIQUID AND USE 1 SPRAY IN EACH NOSTRIL EVERY DAY 48 g 3   • promethazine (PHENERGAN) 25 MG Tab TAKE 1 TABLET BY MOUTH EVERY 8 HOURS AS NEEDED FOR NAUSEA OR VOMITING 60 Tab 1   • traZODone (DESYREL) 50 MG Tab Take 1 Tab by mouth every bedtime. 90 Tab 1   • tizanidine (ZANAFLEX) 4 MG Tab Take 1 Tab by mouth every 6 hours as needed (muscle spasms). 180 Tab 1   • Naloxone (NARCAN) 4 MG/0.1ML Liquid Spray 4 mg in nose as needed (For severe sleepiness or difficulty breathing from possible overdose. Call 911 after administration.).     • VELPHORO 500 MG Chew Tab TK 2 TS PO TID WITH EACH MEALS     • lacosamide (VIMPAT) 100 MG Tab tablet Vimpat 100 mg tablet     • valACYclovir (VALTREX) 500 MG Tab Take 1 Tab by mouth 2 times a day as needed (cold sores). 60 Tab 1   • Cholecalciferol (VITAMIN D3) 10845 UNIT Cap Take 10,000 Units by mouth every day.     • lidocaine (LIDODERM) 5 % Patch Apply 1 Patch to skin as directed as needed (For back pain). On for 12 hours off for 12 hours      • docusate sodium (COLACE) 100 MG Cap Take 100 mg by mouth every day.     • omeprazole (PRILOSEC) 20 MG delayed-release capsule Take 1 Cap by mouth every day. 30 Cap 3   • ferrous sulfate 325 (65 FE) MG tablet Take 325 mg by mouth every day.     • ascorbic acid (ASCORBIC ACID) 500 MG Tab Take 500 mg by mouth every day.     • hydroxychloroquine (PLAQUENIL) 200 MG Tab Take 200 mg by mouth every bedtime.       No current facility-administered medications for this  visit.        Patient Active Problem List    Diagnosis Date Noted   • ESRD (end stage renal disease) on dialysis ([facility]) 10/10/2016     Priority: High   • Drug-seeking behavior 08/02/2015     Priority: High   • ESBL (extended spectrum beta-lactamase) producing bacteria infection 08/25/2014     Priority: High   • Lupus (systemic lupus erythematosus) ([facility]) 01/09/2016     Priority: Medium   • Chronic lupus nephritis ([facility]) 12/01/2013     Priority: Medium   • DAVI (obstructive sleep apnea) 07/18/2011     Priority: Medium   • Anemia in chronic kidney disease, on chronic dialysis ([facility]) 01/01/2019     Priority: Low   • Low back pain 07/19/2017     Priority: Low   • A-V fistula ([facility]) 04/21/2017     Priority: Low   • Hypertension 02/06/2017     Priority: Low   • Thrombocytopenia (CMS-HCC) 01/19/2015     Priority: Low   • Opioid dependence ([facility]) 12/05/2013     Priority: Low   • Fibromyalgia 02/10/2012     Priority: Low   • Anxiety 02/10/2012     Priority: Low   • Seizure disorder ([facility]) 10/02/2019   • Closed nondisplaced fracture of base of fifth metacarpal bone of left hand 02/01/2019   • Moderate episode of recurrent major depressive disorder ([facility]) 02/01/2019   • Research study patient 04/18/2018   • Vitamin D deficiency disease 12/11/2013       Family History   Problem Relation Age of Onset   • Heart Disease Mother    • Cancer Father        She  has a past medical history of Arthritis, Avascular necrosis of bones of both hips ([facility]) (10/10/2016), Clostridium difficile colitis (5/3/2011), Dialysis patient, ESBL (extended spectrum beta-lactamase) producing bacteria infection (8/25/2014), Fibromyalgia, Hypertension, Lupus ([facility]), Pneumonia (), Psychiatric disorder, Pyelonephritis (11/21/2017), Renal failure, Sepsis due to pneumonia ([facility]) (6/7/2018), and Status epilepticus ([facility]) (12/13/2018). She also has no past medical history of Chronic airway obstruction, not elsewhere classified or Fall.  She  has a past surgical history  "that includes gastroscopy-endo (4/10/2011); sclerotheraphy (4/10/2011); gastroscopy-endo (4/18/2011); colonoscopy with biopsy (4/20/2011); gastroscopy-endo (4/27/2011); other (5/2011); other abdominal surgery; av fistula creation (9/9/2014); cath placement (9/9/2014); av fistula creation (11/14/2014); av fistula creation (2/3/2015); hip arthroplasty total (Right, 1/18/2016); closed reduction (Right, 7/5/2016); av fistula creation (Right, 7/12/2016); thrombectomy (Right, 8/20/2016); thrombectomy (Right, 8/21/2016); angioplasty balloon (8/21/2016); tracheostomy; av fistula creation (Right, 12/9/2017); thrombectomy (12/9/2017); av fistula revision (Right, 8/11/2018); av fistula thrombolysis (Right, 8/11/2018); and tahir (N/A, 8/20/2019).       Objective:   /78   Ht 1.651 m (5' 5\")   Wt 85 kg (187 lb 6.3 oz)   BMI 31.18 kg/m²     Physical Exam:  Constitutional: Alert, no distress, well-groomed.  Skin: No rashes in visible areas.  Eye: Round. Conjunctiva clear, lids normal. No icterus.   ENMT: Lips pink without lesions, good dentition, moist mucous membranes. Phonation normal.  Neck: No masses, no thyromegaly. Moves freely without pain.  Respiratory: Unlabored respiratory effort, no cough or audible wheeze  Psych: Alert and oriented x3, normal affect and mood.     Data:   UDS 3/17/2020 as expected with oxycodone and metabolites    Assessment and Plan:   The following treatment plan was discussed:     1. Avascular necrosis of bone of hip, left (HCC)  - Oxycodone HCl 20 MG Tab; Take 1 Tab by mouth 4 times a day as needed (pain) for up to 28 days.  Dispense: 108 Tab; Refill: 0  - Oxycodone HCl 20 MG Tab; Take 1 Tab by mouth 4 times a day as needed for up to 28 days.  Dispense: 108 Tab; Refill: 0    2. Status post total replacement of right hip    3. Peripheral polyneuropathy  - Oxycodone HCl 20 MG Tab; Take 1 Tab by mouth 4 times a day as needed (pain) for up to 28 days.  Dispense: 108 Tab; Refill: 0  - pregabalin " (LYRICA) 50 MG capsule; TK ONE C PO  TID FOR 28 DAYS  Dispense: 84 Cap; Refill: 0  - pregabalin (LYRICA) 50 MG capsule; Take 1 Cap by mouth 3 times a day for 28 days.  Dispense: 84 Cap; Refill: 0  - Oxycodone HCl 20 MG Tab; Take 1 Tab by mouth 4 times a day as needed for up to 28 days.  Dispense: 108 Tab; Refill: 0    4. Chronic, continuous use of opioids    5. Chronic bilateral low back pain without sciatica  - Oxycodone HCl 20 MG Tab; Take 1 Tab by mouth 4 times a day as needed (pain) for up to 28 days.  Dispense: 108 Tab; Refill: 0  - Oxycodone HCl 20 MG Tab; Take 1 Tab by mouth 4 times a day as needed for up to 28 days.  Dispense: 108 Tab; Refill: 0    6. Systemic lupus erythematosus, unspecified SLE type, unspecified organ involvement status (HCC)    7. Vitamin D deficiency disease    8. Fibromyalgia  - pregabalin (LYRICA) 50 MG capsule; TK ONE C PO  TID FOR 28 DAYS  Dispense: 84 Cap; Refill: 0  - pregabalin (LYRICA) 50 MG capsule; Take 1 Cap by mouth 3 times a day for 28 days.  Dispense: 84 Cap; Refill: 0      1. She will continue activity as tolerated.  Continue home exercise program.  2. Continue lyrica 50mg po tid with goal of reducing on non-dialysis days.  Scripts are due on June 4, 2020.  3. Continue oxycodone.  She will attempt to reduce by 1/2 pill as able on non-dialysis.  Discussed trying to do this reduction with one medication at a time.  She will progress with reduction as able.   reviewed and most recent UDS reviewed from 03/17/2020 as expected  4. Continue vitamin D3 supplementation.  5. Continue to follow-up with dialysis, nephrology, and PCP.    Follow-up: Return in about 2 months (around 7/29/2020).

## 2020-07-01 RX ORDER — PROMETHAZINE HYDROCHLORIDE 25 MG/1
TABLET ORAL
Qty: 60 TAB | Refills: 1 | Status: SHIPPED | OUTPATIENT
Start: 2020-07-01 | End: 2020-08-23

## 2020-07-16 RX ORDER — TIZANIDINE 4 MG/1
TABLET ORAL
Qty: 180 TAB | Refills: 1 | Status: SHIPPED | OUTPATIENT
Start: 2020-07-16 | End: 2020-11-03 | Stop reason: SDUPTHER

## 2020-07-28 ENCOUNTER — OFFICE VISIT (OUTPATIENT)
Dept: PHYSICAL MEDICINE AND REHAB | Facility: MEDICAL CENTER | Age: 40
End: 2020-07-28
Payer: MEDICARE

## 2020-07-28 VITALS
WEIGHT: 188.49 LBS | SYSTOLIC BLOOD PRESSURE: 124 MMHG | TEMPERATURE: 98.1 F | HEART RATE: 89 BPM | BODY MASS INDEX: 31.4 KG/M2 | HEIGHT: 65 IN | DIASTOLIC BLOOD PRESSURE: 68 MMHG | OXYGEN SATURATION: 95 %

## 2020-07-28 DIAGNOSIS — N18.6 ESRD (END STAGE RENAL DISEASE) ON DIALYSIS (HCC): ICD-10-CM

## 2020-07-28 DIAGNOSIS — G89.29 CHRONIC BILATERAL LOW BACK PAIN WITHOUT SCIATICA: ICD-10-CM

## 2020-07-28 DIAGNOSIS — G62.9 PERIPHERAL POLYNEUROPATHY: ICD-10-CM

## 2020-07-28 DIAGNOSIS — Z96.641 STATUS POST TOTAL REPLACEMENT OF RIGHT HIP: ICD-10-CM

## 2020-07-28 DIAGNOSIS — M79.7 FIBROMYALGIA: ICD-10-CM

## 2020-07-28 DIAGNOSIS — F11.90 CHRONIC, CONTINUOUS USE OF OPIOIDS: ICD-10-CM

## 2020-07-28 DIAGNOSIS — Z99.2 ESRD (END STAGE RENAL DISEASE) ON DIALYSIS (HCC): ICD-10-CM

## 2020-07-28 DIAGNOSIS — M87.052 AVASCULAR NECROSIS OF BONE OF HIP, LEFT (HCC): ICD-10-CM

## 2020-07-28 DIAGNOSIS — M54.50 CHRONIC BILATERAL LOW BACK PAIN WITHOUT SCIATICA: ICD-10-CM

## 2020-07-28 PROCEDURE — 99214 OFFICE O/P EST MOD 30 MIN: CPT | Performed by: PHYSICAL MEDICINE & REHABILITATION

## 2020-07-28 RX ORDER — PREGABALIN 50 MG/1
50 CAPSULE ORAL 3 TIMES DAILY
Qty: 84 CAP | Refills: 0 | Status: SHIPPED | OUTPATIENT
Start: 2020-08-27 | End: 2020-09-22 | Stop reason: SDUPTHER

## 2020-07-28 RX ORDER — OXYCODONE HYDROCHLORIDE 20 MG/1
1 TABLET ORAL 4 TIMES DAILY PRN
Qty: 108 TAB | Refills: 0 | Status: SHIPPED | OUTPATIENT
Start: 2020-07-30 | End: 2020-08-27

## 2020-07-28 RX ORDER — OXYCODONE HYDROCHLORIDE 20 MG/1
1 TABLET ORAL 4 TIMES DAILY PRN
Qty: 108 TAB | Refills: 0 | Status: SHIPPED | OUTPATIENT
Start: 2020-08-27 | End: 2020-09-22 | Stop reason: SDUPTHER

## 2020-07-28 RX ORDER — SUCROFERRIC OXYHYDROXIDE 500 MG/1
TABLET, CHEWABLE ORAL
Qty: 180 TAB | Refills: 3 | Status: SHIPPED | OUTPATIENT
Start: 2020-07-28 | End: 2021-10-01

## 2020-07-28 RX ORDER — PREGABALIN 50 MG/1
50 CAPSULE ORAL 3 TIMES DAILY
Qty: 84 CAP | Refills: 0 | Status: SHIPPED | OUTPATIENT
Start: 2020-07-30 | End: 2020-08-27

## 2020-07-28 ASSESSMENT — PATIENT HEALTH QUESTIONNAIRE - PHQ9: CLINICAL INTERPRETATION OF PHQ2 SCORE: 0

## 2020-07-28 ASSESSMENT — PAIN SCALES - GENERAL: PAINLEVEL: 6=MODERATE PAIN

## 2020-07-28 ASSESSMENT — FIBROSIS 4 INDEX: FIB4 SCORE: 1.027777777777777778

## 2020-07-28 NOTE — TELEPHONE ENCOUNTER
Received request via: Pharmacy    Was the patient seen in the last year in this department? No     Does the patient have an active prescription (recently filled or refills available) for medication(s) requested? No]

## 2020-07-28 NOTE — PROGRESS NOTES
Follow up patient note  Pain Medicine, Interventional spine and sports physiatry, Physical medicine rehabilitation      Chief complaint:   Diffuse joint and body pain, low back, hips and legs      HISTORY      HPI  Patient identification/Interval history:   Debby Carrizales 39 y.o. female who has chronic pain related to rheumatologic disease, peripheral neuropathy and AVN hips, lupus.      Since her last visit, Debby reports that she continues to have pain in the right hip.  This has been intermittently ongoing, despite right total hip replacement.  She doesn't think that this is as bad as it was last visit, she is not using her walker now.    Overall, her pain is 6-7/10 on the NRS.  Dialysis is painful, but she has been able to tolerate it with the use of her pain medications.  She is usually taking 1/2 pill less on non-dialysis days.    Her bowel movements have been pretty regular with use of colace.      Left hip pain has been improved after intra-articular hip injection on 11/27/2019.      She is/was(?) on the [facility] transplant list, no new news on this lately.     ORT: 3  PHQ-9:0    Review of records:   Hospital discharge report noted from 08/12/2019-08/21/2019 admission.  She was admitted for a near syncopal episode.  Cardiac evaluation was suspicious for endocarditis and she was started on empiric IV abx.  GAYATHRI demonstrate no endocarditis, but did show that her venous catheter was pushing through the tricuspid valve.  Cultures were negative.  Dr. Jansen, vascular surgery, was consulted and they discussed follow-up plans for port management and this will be planned after discharge.    ROS   Unchanged from 05/29/2020 except as noted in HPI     Red Flags :   Chills, Sweats:No fevers, chills and night sweats.  No fevers lately  Involuntary Weight Loss: Denies  Bowel/Bladder Incontinence: Denies  Saddle Anesthesia: Denies    PMHx:   Past Medical History:   Diagnosis Date   • Arthritis     all joints,r/t  lupus   • Avascular necrosis of bones of both hips ([facility]) 10/10/2016   • Clostridium difficile colitis 5/3/2011   • Dialysis patient    • ESBL (extended spectrum beta-lactamase) producing bacteria infection 8/25/2014   • Fibromyalgia    • Hypertension    • Lupus ([facility])    • Pneumonia    • Psychiatric disorder     anxiety, depression   • Pyelonephritis 11/21/2017   • Renal failure    • Sepsis due to pneumonia ([facility]) 6/7/2018   • Status epilepticus ([facility]) 12/13/2018       PSHx:   Past Surgical History:   Procedure Laterality Date   • GAYATHRI N/A 8/20/2019    Procedure: ECHOCARDIOGRAM, TRANSESOPHAGEAL;  Surgeon: Marta Elaine M.D.;  Location: SURGERY [facility];  Service: Cardiac   • AV FISTULA REVISION Right 8/11/2018    Procedure: AV FISTULA REVISION- Graft and Thrombectomy;  Surgeon: Shabbir Ardon M.D.;  Location: [facility];  Service: General   • AV FISTULA THROMBOLYSIS Right 8/11/2018    Procedure: AV FISTULA THROMBOLYSIS;  Surgeon: Shabbir Ardon M.D.;  Location: [facility];  Service: General   • AV FISTULA CREATION Right 12/9/2017    Procedure: AV FISTULA CREATION-ARM FOR GRAFT;  Surgeon: Lesly Jansen M.D.;  Location: [facility];  Service: General   • THROMBECTOMY  12/9/2017    Procedure: THROMBECTOMY;  Surgeon: Lesly Jansen M.D.;  Location: [facility];  Service: General   • THROMBECTOMY Right 8/21/2016    Procedure: THROMBECTOMY - right AV fistula graft with grams;  Surgeon: Shabbir Ardon M.D.;  Location: SURGERY [facility];  Service:    • ANGIOPLASTY BALLOON  8/21/2016    Procedure: ANGIOPLASTY BALLOON;  Surgeon: Shabbir Ardon M.D.;  Location: SURGERY [facility];  Service:    • THROMBECTOMY Right 8/20/2016    Procedure: THROMBECTOMY AV GRAFT;  Surgeon: Shabbir Ardon M.D.;  Location: [facility];  Service:    • AV FISTULA CREATION Right 7/12/2016    Procedure: AV FISTULA CREATION WITH GRAFT  BRACHIAL AXILLARY;  Surgeon: Shabbir Ardon M.D.;  Location: SURGERY [facility];  Service:    • CLOSED REDUCTION Right 7/5/2016    Procedure: CLOSED REDUCTION- Hip ;  Surgeon: Michael Holman M.D.;  Location: SURGERY [facility];  Service:    • HIP ARTHROPLASTY TOTAL Right 1/18/2016    Procedure: HIP ARTHROPLASTY TOTAL;  Surgeon: Michael Holman M.D.;  Location: SURGERY [facility];  Service:    • AV FISTULA CREATION  2/3/2015    Performed by Shabbir Ardon M.D. at SURGERY [facility]   • AV FISTULA CREATION  11/14/2014    Performed by Shabbir Ardon M.D. at SURGERY [facility]   • AV FISTULA CREATION  9/9/2014    Performed by Shabbir Ardon M.D. at [facility]   • CATH PLACEMENT  9/9/2014    Performed by Shabbir Ardon M.D. at SURGERY [facility]   • OTHER  5/2011    tracheostomy   • GASTROSCOPY-ENDO  4/27/2011    Performed by PALMIRA DOAN at ENDOSCOPY [facility]   • COLONOSCOPY WITH BIOPSY  4/20/2011    Performed by ALCIRA CRUZ at [facility]   • GASTROSCOPY-ENDO  4/18/2011    Performed by ALCIRA CRUZ at [facility]   • GASTROSCOPY-ENDO  4/10/2011    Performed by SYED JURADO at [facility]   • SCLEROTHERAPHY  4/10/2011    Performed by SYED JURADO at [facility]   • OTHER ABDOMINAL SURGERY      kidney biopsy   • TRACHEOSTOMY         Family history   Denies neuromuscular disease  Family History   Problem Relation Age of Onset   • Heart Disease Mother    • Cancer Father        Medications:   Outpatient Medications Marked as Taking for the 7/28/20 encounter (Office Visit) with Quinn Chandler M.D.   Medication Sig Dispense Refill   • VELPHORO 500 MG Chew Tab TAKE 2 TABLETS BY MOUTH THREE TIMES DAILY WITH EACH MEALS 180 Tab 3   • [START ON 7/30/2020] Oxycodone HCl 20 MG Tab Take 1 Tab by mouth 4 times a day as needed for up to 28 days. 108 Tab 0   • [START ON 7/30/2020]  pregabalin (LYRICA) 50 MG capsule Take 1 Cap by mouth 3 times a day for 28 days. 84 Cap 0   • [START ON 8/27/2020] pregabalin (LYRICA) 50 MG capsule Take 1 Cap by mouth 3 times a day for 28 days. 84 Cap 0   • [START ON 8/27/2020] Oxycodone HCl 20 MG Tab Take 1 Tab by mouth 4 times a day as needed for up to 28 days. 108 Tab 0   • tizanidine (ZANAFLEX) 4 MG Tab TAKE 1 TABLET BY MOUTH EVERY 6 HOURS AS NEEDED FOR MUSCLE SPASMS 180 Tab 1   • promethazine (PHENERGAN) 25 MG Tab TAKE 1 TABLET BY MOUTH EVERY 8 HOURS AS NEEDED FOR NAUSEA OR VOMITING 60 Tab 1   • amLODIPine (NORVASC) 10 MG Tab Take 1 Tab by mouth every day. 90 Tab 3   • fluticasone (FLONASE) 50 MCG/ACT nasal spray SHAKE LIQUID AND USE 1 SPRAY IN EACH NOSTRIL EVERY DAY 48 g 3   • traZODone (DESYREL) 50 MG Tab Take 1 Tab by mouth every bedtime. 90 Tab 1   • lacosamide (VIMPAT) 100 MG Tab tablet Vimpat 100 mg tablet     • valACYclovir (VALTREX) 500 MG Tab Take 1 Tab by mouth 2 times a day as needed (cold sores). 60 Tab 1   • Cholecalciferol (VITAMIN D3) 16529 UNIT Cap Take 10,000 Units by mouth every day.     • lidocaine (LIDODERM) 5 % Patch Apply 1 Patch to skin as directed as needed (For back pain). On for 12 hours off for 12 hours      • docusate sodium (COLACE) 100 MG Cap Take 100 mg by mouth every day.     • omeprazole (PRILOSEC) 20 MG delayed-release capsule Take 1 Cap by mouth every day. 30 Cap 3   • ferrous sulfate 325 (65 FE) MG tablet Take 325 mg by mouth every day.     • ascorbic acid (ASCORBIC ACID) 500 MG Tab Take 500 mg by mouth every day.     • hydroxychloroquine (PLAQUENIL) 200 MG Tab Take 200 mg by mouth every bedtime.         Allergies:   Allergies   Allergen Reactions   • Lorazepam [Ativan] Anxiety     Patient becomes severely paranoid and agitated   • Morphine Itching     Tolerates Dilaudid   • Seasonal Runny Nose and Itching     Hay fever, sabiha brush       Social Hx:   Social History     Socioeconomic History   • Marital status: Single      Spouse name: Not on file   • Number of children: Not on file   • Years of education: Not on file   • Highest education level: Not on file   Occupational History   • Not on file   Social Needs   • Financial resource strain: Not on file   • Food insecurity     Worry: Not on file     Inability: Not on file   • Transportation needs     Medical: Not on file     Non-medical: Not on file   Tobacco Use   • Smoking status: Former Smoker     Packs/day: 0.50     Years: 18.00     Pack years: 9.00     Types: Cigarettes     Start date:      Last attempt to quit: 2011     Years since quittin.1   • Smokeless tobacco: Never Used   • Tobacco comment: started at 15   Substance and Sexual Activity   • Alcohol use: No     Alcohol/week: 0.0 oz     Frequency: Never     Binge frequency: Never   • Drug use: No     Types: Marijuana   • Sexual activity: Not Currently     Partners: Male   Lifestyle   • Physical activity     Days per week: Not on file     Minutes per session: Not on file   • Stress: Not on file   Relationships   • Social connections     Talks on phone: Not on file     Gets together: Not on file     Attends Rastafari service: Not on file     Active member of club or organization: Not on file     Attends meetings of clubs or organizations: Not on file     Relationship status: Not on file   • Intimate partner violence     Fear of current or ex partner: Not on file     Emotionally abused: Not on file     Physically abused: Not on file     Forced sexual activity: Not on file   Other Topics Concern   •  Service No   • Blood Transfusions Yes   • Caffeine Concern No   • Occupational Exposure No   • Hobby Hazards No   • Sleep Concern Yes   • Stress Concern Yes   • Weight Concern Yes   • Special Diet Yes   • Back Care No   • Exercise Yes   • Bike Helmet No   • Seat Belt Yes   • Self-Exams Yes   Social History Narrative   • Not on file       EXAMINATION     Physical Exam:   Vitals: /68 (BP Location: Left arm,  "Patient Position: Sitting, BP Cuff Size: Adult long)   Pulse 89   Temp 36.7 °C (98.1 °F) (Temporal)   Ht 1.651 m (5' 5\")   Wt 85.5 kg (188 lb 7.9 oz)   SpO2 95%     Constitutional:   Body Habitus: Body mass index is 31.37 kg/m².  Cooperation: Fully cooperates with exam  Appearance: Well-groomed no disheveled, in no acute distress, wearing a face mask  Respiratory-  breathing comfortable on room air, no wheezing.  Cardiovascular- trace edema in the lower extremities  Psychiatric- alert and oriented ×3. Normal affect.   Spine:   Tenderness to palpation over the cervical, thoracic and lumbar paraspinals bilaterally, trapezius bilateral as well  Gait slow, steady without loss of balance.  No use of assist device.   No pain with ROM of the left hip.  No skin lesion over the region of injection      MEDICAL DECISION MAKING    DATA    Labs:     UDS:  03/12/2020 Positive for oxycodone and metabolites  12/19/2019 oxycodone and metabolites   08/22/2019 Oxycodone and metabolites (patient does not have a script for phenergan with codeine and this was negative) Therefore, consistent with medications  06/13/2019 Negative for oxycodone, positive for other oxycodone metabolites  04/16/2019 Positive for oxymorphone  01/17/2019 Oxycodone and metabolites as expected  10/02/2018 Oxycodone and metabolites, also fentanyl    01/09/2019: Hep B surface ag negative  01/08/2019: GFR 7; BUN 23 Cr 6.68, Plt 160    12/15/2018 CRP 3.03    GFR 08/21/2019, 4  08/21/2019 WBC 4.6, Hb 10.1, Hct 31.6, Plt 156    BMP 12/16/2018  Na 136, Potassium 4.4, chloride 96, CO2 23 Glucose 83, BUN 55H, Cr 9.44H, Calcium 9.9, Anion gap 17.0H    CBC 3.3, Hb 9.6, Hct 31.3, Plt 115    Lab Results   Component Value Date/Time    HBA1C 4.9 06/07/2018 02:29 AM        Imaging:   I reviewed the following radiology reports reviewed  GAYATHRI 08/20/2019  Echocardiography Laboratory  CONCLUSIONS  LV EF    65%.  Structurally normal tricuspid valve.  Trace tricuspid " regurgitation.  Port catheter noted in the SVC and right atrium.  Tip support appears bright, no obvious vegetation.  The tip of the port abuts the tricuspid valve.  Recommend the port be pulled back approximately 2 cm.  No evidence of endocarditis.    Xray hip with pelvis-left 07/28/2019  Left femoral head heterogeneous density is compatible with known diagnosis of avascular necrosis. There is no joint space narrowing or spurring    Right arthroplasty without evident complication.    Xray left foot 01/21/2019  Nondisplaced transverse fracture through the base of the fifth metatarsal.    MRI lumbar spine 1/1/19  1.  Diffusely decreased signal again seen throughout the marrow space consistent with red marrow conversion, likely secondary to chronic anemia.  2.  No significant disc bulging, central canal narrowing, or foraminal narrowing.  3.  No lumbar spine fracture or subluxation.    MRI brain 12/13/2018  1.  There is no hippocampal sclerosis.  2.  There is no evidence of cortical dysplasia or neuronal migrational abnormalities.  3.  A few nonspecific T2 hyperintensities in the supratentorial white matter likely representing nonspecific foci of gliosis, chronic ischemia and demyelination. This is unchanged since the previous MRI dated 2/23/2012.       Chest xray 06/07/2018: Minimal right-sided opacities as described above, suggesting pneumonia.               Results for orders placed during the hospital encounter of 07/19/17   MR-LUMBAR SPINE-W/O    Impression 1.  Diffuse decreased bone marrow signal intensity throughout the lumbar spine and sacrum, presumably secondary to chronic anemia.    2.  Otherwise unremarkable MRI scan of the lumbar spine without contrast.                                    DIAGNOSIS   Visit Diagnoses     ICD-10-CM   1. Peripheral polyneuropathy  G62.9   2. Fibromyalgia  M79.7   3. Avascular necrosis of bone of hip, left ([facility])  M87.052   4. Status post total replacement of right hip  Z96.641    5. ESRD (end stage renal disease) on dialysis ([facility])  N18.6    Z99.2   6. Chronic, continuous use of opioids  F11.90   7. Chronic bilateral low back pain without sciatica  M54.5    G89.29         ASSESSMENT and PLAN:     Debby Carrizales 39 y.o. Female returns for follow-up of her chronic pain related to lupus, AVN hips, low back and peripheral neuropathy    Debby was seen today for follow-up.    Diagnoses and all orders for this visit:    Peripheral polyneuropathy  -     Oxycodone HCl 20 MG Tab; Take 1 Tab by mouth 4 times a day as needed for up to 28 days.  -     pregabalin (LYRICA) 50 MG capsule; Take 1 Cap by mouth 3 times a day for 28 days.  -     pregabalin (LYRICA) 50 MG capsule; Take 1 Cap by mouth 3 times a day for 28 days.  -     Oxycodone HCl 20 MG Tab; Take 1 Tab by mouth 4 times a day as needed for up to 28 days.  -     Consent for Opiate Prescription  -     Controlled Substance Treatment Agreement    Fibromyalgia  -     pregabalin (LYRICA) 50 MG capsule; Take 1 Cap by mouth 3 times a day for 28 days.  -     pregabalin (LYRICA) 50 MG capsule; Take 1 Cap by mouth 3 times a day for 28 days.    Avascular necrosis of bone of hip, left ([facility])  -     Oxycodone HCl 20 MG Tab; Take 1 Tab by mouth 4 times a day as needed for up to 28 days.  -     Oxycodone HCl 20 MG Tab; Take 1 Tab by mouth 4 times a day as needed for up to 28 days.  -     Consent for Opiate Prescription  -     Controlled Substance Treatment Agreement    Status post total replacement of right hip    ESRD (end stage renal disease) on dialysis ([facility])    Chronic, continuous use of opioids    Chronic bilateral low back pain without sciatica  -     Oxycodone HCl 20 MG Tab; Take 1 Tab by mouth 4 times a day as needed for up to 28 days.  -     Oxycodone HCl 20 MG Tab; Take 1 Tab by mouth 4 times a day as needed for up to 28 days.  -     Consent for Opiate Prescription  -     Controlled Substance Treatment Agreement         1. Reviewed most  "recent .    2. Continue oxycodone.  Discussed continuing at the same dose for now.  Script given for this month and next.  3. Discussed trying to reduce lyrica to twice a day on nondialysis days.  Work on decreasing to two a day after that.  Discussed possible side effects.  4. Follow-up with PCP and specialists.       In prescribing controlled substances to this patient, I certify that I have obtained and reviewed the medical history of Debby Carrizales. I have also made a good ly effort to obtain applicable records from other providers who have treated the patient and records demonstrating the following: report of \"drug-seeking behavior,\" although I suspect that she has organic reasons for pain and will continue to monitor as appropriate.     I have conducted a physical exam and documented it. I have reviewed Ms. Carrizales’s prescription history as maintained by the [place] Prescription Monitoring Program.     I have assessed the patient’s risk for abuse, dependency, and addiction using the validated Opioid Risk Tool available at https://www.mdcalc.com/rcbxfz-wsuk-trzd-ort-narcotic-abuse.     Given the above, I believe the benefits of controlled substance therapy outweigh the risks. The reasons for prescribing controlled substances include non-narcotic, oral analgesic alternatives have been inadequate for pain control and in my professional opinion, controlled substances are the only reasonable choice for this patient because she has limitations to medication choices due to being on dialysis.   Accordingly, I have discussed the risk and benefits, treatment plan, and alternative therapies with the patient.       Follow up: 2 months      Please note that this dictation was created using voice recognition software. I have made every reasonable attempt to correct obvious errors but there may be errors of grammar and content that I may have overlooked prior to finalization of this note.      Quinn Chandler, " MD  Interventional Spine and Sports Physiatry  Physical Medicine and Rehabilitation  [facility]

## 2020-08-14 NOTE — ED NOTES
Med rec compete per patient and [facility] [phone]  Allergies reviewed  Per patient she completed an antibiotic for UTI recently, but called pharmacy for name and they didn't have anything recent  Patient stated she had her INR checked a couple weeks ago   [facility]  Neurocritical Care  History & Physical    Admit Date: 8/14/2020  Service Date: 08/14/2020  Length of Stay: 0    Subjective:     Chief Complaint: Cerebrovascular accident (CVA) due to embolism of right middle cerebral artery    History of Present Illness: Diomedes Mohr is a 61 yo AA female with PMHx of CHF, DM, and HTN presented to ED for acute onset dysarthria and left-sided facial droop started at 7:30 am on the day of arrival. NIHSS 2 ar arrival. Patient underwent CTH and MRI wich revealed right MCA territory ischemic stroke. She received tPA. No thrombectomy due to completion of stroke.   Patient admitted to [facility] for post-tPA monitoring.      Past Medical History:   Diagnosis Date    Anxiety     CHF (congestive heart failure)     Depression     Diabetes mellitus     Dyspnea     H/O coronary angiogram     H/O: hysterectomy     Hyperlipemia     Hypertension     Pulmonary edema     Schizophrenia      Past Surgical History:   Procedure Laterality Date    BLADDER SUSPENSION      CARPAL TUNNEL RELEASE Right     HEEL SPUR SURGERY Left     LEFT HEART CATHETERIZATION Bilateral 12/27/2019    Procedure: Left heart cath;  Surgeon: Steve Chambers MD;  Location: [facility] CATH LAB;  Service: Cardiology;  Laterality: Bilateral;    TUBAL LIGATION        No current facility-administered medications on file prior to encounter.      Current Outpatient Medications on File Prior to Encounter   Medication Sig Dispense Refill    aspirin 81 MG Chew Take 1 tablet (81 mg total) by mouth once daily. 90 tablet 0    atorvastatin (LIPITOR) 40 MG tablet Take 1 tablet (40 mg total) by mouth once daily. 90 tablet 0    baclofen (LIORESAL) 20 MG tablet Take 20 mg by mouth 3 (three) times daily.      benzonatate (TESSALON) 100 MG capsule Take 1 capsule (100 mg total) by mouth 3 (three) times daily as needed for Cough. 20 capsule 0    diclofenac (CATAFLAM) 50 MG tablet Take 50 mg by mouth 2  (two) times daily.      duloxetine (CYMBALTA) 60 MG capsule Take 60 mg by mouth once daily.      furosemide (LASIX) 40 MG tablet Take 2 tablets (80 mg total) by mouth once daily AND 1 tablet (40 mg total) every evening. TAKE EXTRA 40 MG IF GAINING 2 OR MORE POUNDS IN 2 DAYS.. 90 tablet 2    gabapentin (NEURONTIN) 100 MG capsule Begin with 1 by mouth at bedtime daily for 7 days.  Then increase to one by mouth twice a day for 7 days and then one by mouth 3 times a day thereafter. 90 capsule 11    glimepiride (AMARYL) 1 MG tablet Take 1 tablet by mouth once daily.      hydrALAZINE (APRESOLINE) 25 MG tablet Take 1 tablet (25 mg total) by mouth every 12 (twelve) hours. 60 tablet 2    ISOSORBIDE ORAL Take by mouth.      metformin (GLUCOPHAGE) 500 MG tablet Take 1 tablet by mouth 2 (two) times daily.      metoprolol succinate (TOPROL-XL) 100 MG 24 hr tablet Take 1 tablet by mouth once daily.      naproxen (NAPROSYN) 500 MG tablet Take 1 tablet (500 mg total) by mouth 2 (two) times daily as needed (take as needed for pain). 20 tablet 0    spironolactone (ALDACTONE) 25 MG tablet Take 1 tablet (25 mg total) by mouth once daily. 30 tablet 2      Allergies: Captopril    Family History   Family history unknown: Yes     Social History     Tobacco Use    Smoking status: Current Every Day Smoker     Types: Cigarettes   Substance Use Topics    Alcohol use: No     Alcohol/week: 0.0 standard drinks    Drug use: No     Review of Systems   Constitutional: Negative for fever.   HENT: Negative for trouble swallowing and voice change.    Eyes: Negative for visual disturbance.   Respiratory: Positive for cough, chest tightness and shortness of breath.    Cardiovascular: Positive for palpitations.   Gastrointestinal: Negative for abdominal pain.   Musculoskeletal: Negative for neck pain and neck stiffness.   Neurological: Positive for facial asymmetry (left side droop), speech difficulty and headaches. Negative for seizures,  weakness, light-headedness and numbness.   Psychiatric/Behavioral: Negative for agitation, confusion and hallucinations.     Objective:     Vitals:    Temp: 98 °F (36.7 °C)  Pulse: (!) 113  BP: (!) 144/83  MAP (mmHg): 106  Resp: (!) 27  SpO2: 99 %  O2 Device (Oxygen Therapy): room air    Temp  Min: 98 °F (36.7 °C)  Max: 98.3 °F (36.8 °C)  Pulse  Min: 91  Max: 117  BP  Min: 120/69  Max: 168/76  MAP (mmHg)  Min: 95  Max: 113  Resp  Min: 16  Max: 27  SpO2  Min: 99 %  Max: 100 %    No intake/output data recorded.           Physical Exam  Constitutional:       General: She is not in acute distress.     Appearance: She is not ill-appearing.   HENT:      Head: Normocephalic and atraumatic.      Mouth/Throat:      Mouth: Mucous membranes are dry.   Eyes:      General: No visual field deficit.     Extraocular Movements: Extraocular movements intact.      Pupils: Pupils are equal, round, and reactive to light.   Neck:      Musculoskeletal: Normal range of motion.   Cardiovascular:      Rate and Rhythm: Tachycardia present.   Pulmonary:      Effort: Pulmonary effort is normal. No respiratory distress.      Breath sounds: Rales present. No wheezing.   Abdominal:      General: Abdomen is flat.   Musculoskeletal: Normal range of motion.   Skin:     General: Skin is warm.      Capillary Refill: Capillary refill takes less than 2 seconds.   Neurological:      Mental Status: She is alert.      Cranial Nerves: Dysarthria and facial asymmetry (left side facial droop) present.      Sensory: Sensation is intact.      Motor: Motor function is intact.               Assessment/Plan:     Neuro  * Cerebrovascular accident (CVA) due to embolism of right middle cerebral artery  60 year-old AA female with CHF, HTN, DM presented with RMCA s/p tPA.  CTA with major branch vessel occlusion of the proximal anterior superior M2 branch of right MCA.    Plan:  - admit to [facility]  - neuro checks/ vital sign q1hr  - SBP<180  - TTE with shunt study  - hold AP  for 24 hrs post-tPA , End time 09:56 am on 8/15  - atorvastatin 40 mg daily  - hold hsq for now  - vascular neurology following  - PT/OT/SLP        Cardiac/Vascular  Dilated cardiomyopathy  EF 15% on echo in July,   On furosemide 80 mg am, 40 mg pm, Aldactone 25 mg daily, isosorbide (?dose), Toprol-xl 100 mg daily    Plan:  - TTE  - furosemide 40 mg po BID  - strict intake and output  - metoprolol 5 mg IV prn for HR>120, if sustained will resume toprol-xl  - will hold isosorbide and aldactone for now    Coronary artery disease involving native heart  On aspririn    Mixed hyperlipidemia  Atorvastatin 40 mg daily at home    Will increase to high intensity statin as atorvastatin 80 mg daily    Essential hypertension  On hydralazine 25 mg BID at home   Will hold for now to avoid hypoperfusion post ischemic stroke  SBP goal<160, cardene gtt on    Endocrine  Type 2 diabetes mellitus without complication, without long-term current use of insulin  MDSSI  POCT q6hrs  diebetic diet    Neck pain  Gabapentin and lyrica at home  Will hold off on gabapentin, will resume Lyrica and re-assess      The patient is being Prophylaxed for:  Venous Thromboembolism with: Mechanical  Stress Ulcer with: None  Ventilator Pneumonia with: not applicable    Activity Orders          Diet consistent carbohydrate: Carb Control starting at 08/14 1401        Full Code    Marisol Balderas MD  Neurocritical Care  [facility]

## 2020-08-23 RX ORDER — PROMETHAZINE HYDROCHLORIDE 25 MG/1
TABLET ORAL
Qty: 60 TAB | Refills: 1 | Status: SHIPPED | OUTPATIENT
Start: 2020-08-23 | End: 2020-10-20 | Stop reason: SDUPTHER

## 2020-09-22 ENCOUNTER — OFFICE VISIT (OUTPATIENT)
Dept: PHYSICAL MEDICINE AND REHAB | Facility: MEDICAL CENTER | Age: 40
End: 2020-09-22
Payer: MEDICARE

## 2020-09-22 VITALS
OXYGEN SATURATION: 98 % | BODY MASS INDEX: 30.52 KG/M2 | SYSTOLIC BLOOD PRESSURE: 128 MMHG | HEART RATE: 100 BPM | TEMPERATURE: 98.8 F | WEIGHT: 183.2 LBS | HEIGHT: 65 IN | DIASTOLIC BLOOD PRESSURE: 62 MMHG

## 2020-09-22 DIAGNOSIS — M79.7 FIBROMYALGIA: ICD-10-CM

## 2020-09-22 DIAGNOSIS — M87.052 AVASCULAR NECROSIS OF BONE OF HIP, LEFT (HCC): ICD-10-CM

## 2020-09-22 DIAGNOSIS — M77.9 INFLAMMATION AROUND JOINT: ICD-10-CM

## 2020-09-22 DIAGNOSIS — G89.29 CHRONIC BILATERAL LOW BACK PAIN WITHOUT SCIATICA: ICD-10-CM

## 2020-09-22 DIAGNOSIS — Z96.641 STATUS POST TOTAL REPLACEMENT OF RIGHT HIP: ICD-10-CM

## 2020-09-22 DIAGNOSIS — M54.50 CHRONIC BILATERAL LOW BACK PAIN WITHOUT SCIATICA: ICD-10-CM

## 2020-09-22 DIAGNOSIS — G62.9 PERIPHERAL POLYNEUROPATHY: ICD-10-CM

## 2020-09-22 PROCEDURE — 99214 OFFICE O/P EST MOD 30 MIN: CPT | Performed by: PHYSICAL MEDICINE & REHABILITATION

## 2020-09-22 RX ORDER — PREGABALIN 50 MG/1
50 CAPSULE ORAL 2 TIMES DAILY
Qty: 60 CAP | Refills: 1 | Status: SHIPPED | OUTPATIENT
Start: 2020-09-24 | End: 2020-10-24

## 2020-09-22 RX ORDER — OXYCODONE HYDROCHLORIDE 20 MG/1
1 TABLET ORAL 4 TIMES DAILY PRN
Qty: 108 TAB | Refills: 0 | Status: SHIPPED | OUTPATIENT
Start: 2020-09-24 | End: 2020-10-22

## 2020-09-22 RX ORDER — OXYCODONE HYDROCHLORIDE 20 MG/1
1 TABLET ORAL 4 TIMES DAILY PRN
Qty: 108 TAB | Refills: 0 | Status: SHIPPED | OUTPATIENT
Start: 2020-10-22 | End: 2020-11-17 | Stop reason: SDUPTHER

## 2020-09-22 ASSESSMENT — PAIN SCALES - GENERAL: PAINLEVEL: 6=MODERATE PAIN

## 2020-09-22 ASSESSMENT — FIBROSIS 4 INDEX: FIB4 SCORE: 1.055555555555555556

## 2020-09-22 ASSESSMENT — PATIENT HEALTH QUESTIONNAIRE - PHQ9: CLINICAL INTERPRETATION OF PHQ2 SCORE: 0

## 2020-09-22 NOTE — PROGRESS NOTES
Follow up patient note  Pain Medicine, Interventional spine and sports physiatry, Physical medicine rehabilitation      Chief complaint:   Diffuse joint and body pain, low back, hips and legs      HISTORY      HPI  Patient identification/Interval history:   Debby Carrizales 40 y.o. female who has chronic pain related to rheumatologic disease, peripheral neuropathy and AVN hips, lupus.      Since the last visit, Debby reports that she continues to have diffuse joint pains in the shoulders and hips with cramping in her legs.  The pain in her right hip has been intermittent.  This seems to be present intermittently and lasts for about a week at a time and then seems to improve a little bit.      Her left hip has been starting to be more painful, although this is only in the last week.  This has been better after intra-articular hip injection on 11/27/2019.      Pain is a a 6/10 on the NRS overall.  She has constant pain in her shoulders and thighs.     She decreased her lyrica and this seems to have helped with her swelling in her legs.  Overall, she continues to have pain in her legs, but it is not worse.  She is taking two a day and this seems to help.    Dialysis is painful, but she has been able to tolerate it with the use of her pain medications.  She is usually taking 1/2 pill of percocet less on non-dialysis days.    Her bowel movements have been pretty regular with use of colace.        ORT: 3  PHQ-9:0    Review of records:   Hospital discharge report noted from 08/12/2019-08/21/2019 admission.  She was admitted for a near syncopal episode.  Cardiac evaluation was suspicious for endocarditis and she was started on empiric IV abx.  GAYATHRI demonstrate no endocarditis, but did show that her venous catheter was pushing through the tricuspid valve.  Cultures were negative.  Dr. Jansen, vascular surgery, was consulted and they discussed follow-up plans for port management and this will be planned after  discharge.    ROS   Unchanged from 07/28/2020 except as noted in HPI     Red Flags :   Chills, Sweats:No fevers, chills and night sweats.  No fevers lately  Involuntary Weight Loss: Denies  Bowel/Bladder Incontinence: Denies  Saddle Anesthesia: Denies    PMHx:   Past Medical History:   Diagnosis Date   • Arthritis     all joints,r/t lupus   • Avascular necrosis of bones of both hips ([facility]) 10/10/2016   • Clostridium difficile colitis 5/3/2011   • Dialysis patient    • ESBL (extended spectrum beta-lactamase) producing bacteria infection 8/25/2014   • Fibromyalgia    • Hypertension    • Lupus ([facility])    • Pneumonia    • Psychiatric disorder     anxiety, depression   • Pyelonephritis 11/21/2017   • Renal failure    • Sepsis due to pneumonia ([facility]) 6/7/2018   • Status epilepticus ([facility]) 12/13/2018       PSHx:   Past Surgical History:   Procedure Laterality Date   • GAYATHRI N/A 8/20/2019    Procedure: ECHOCARDIOGRAM, TRANSESOPHAGEAL;  Surgeon: Marta Elaine M.D.;  Location: [facility];  Service: Cardiac   • AV FISTULA REVISION Right 8/11/2018    Procedure: AV FISTULA REVISION- Graft and Thrombectomy;  Surgeon: Shabbir Ardon M.D.;  Location: [facility];  Service: General   • AV FISTULA THROMBOLYSIS Right 8/11/2018    Procedure: AV FISTULA THROMBOLYSIS;  Surgeon: Shabbir Ardon M.D.;  Location: [facility];  Service: General   • AV FISTULA CREATION Right 12/9/2017    Procedure: AV FISTULA CREATION-ARM FOR GRAFT;  Surgeon: Lesly Jansen M.D.;  Location: [facility];  Service: General   • THROMBECTOMY  12/9/2017    Procedure: THROMBECTOMY;  Surgeon: Lesly Jansen M.D.;  Location: [facility];  Service: General   • THROMBECTOMY Right 8/21/2016    Procedure: THROMBECTOMY - right AV fistula graft with grams;  Surgeon: Shabbir Ardon M.D.;  Location: [facility];  Service:    • ANGIOPLASTY BALLOON  8/21/2016    Procedure:  ANGIOPLASTY BALLOON;  Surgeon: Shabbir Ardon M.D.;  Location: [facility];  Service:    • THROMBECTOMY Right 8/20/2016    Procedure: THROMBECTOMY AV GRAFT;  Surgeon: Shabbir Ardon M.D.;  Location: [facility];  Service:    • AV FISTULA CREATION Right 7/12/2016    Procedure: AV FISTULA CREATION WITH GRAFT BRACHIAL AXILLARY;  Surgeon: Shabbir Ardon M.D.;  Location: [facility];  Service:    • CLOSED REDUCTION Right 7/5/2016    Procedure: CLOSED REDUCTION- Hip ;  Surgeon: Michael Holman M.D.;  Location: [facility];  Service:    • HIP ARTHROPLASTY TOTAL Right 1/18/2016    Procedure: HIP ARTHROPLASTY TOTAL;  Surgeon: Michael Holman M.D.;  Location: [facility];  Service:    • AV FISTULA CREATION  2/3/2015    Performed by Shabbir Ardon M.D. at [facility]   • AV FISTULA CREATION  11/14/2014    Performed by Shabbir Ardon M.D. at [facility]   • AV FISTULA CREATION  9/9/2014    Performed by Shabbir Ardon M.D. at [facility]   • CATH PLACEMENT  9/9/2014    Performed by Shabbir Ardon M.D. at [facility]   • OTHER  5/2011    tracheostomy   • GASTROSCOPY-ENDO  4/27/2011    Performed by PALMIRA DOAN at [facility]   • COLONOSCOPY WITH BIOPSY  4/20/2011    Performed by ALCIRA CRUZ at [facility]   • GASTROSCOPY-ENDO  4/18/2011    Performed by ALCIRA CRUZ at [facility]   • GASTROSCOPY-ENDO  4/10/2011    Performed by SYED JURADO at [facility]   • SCLEROTHERAPHY  4/10/2011    Performed by SYED JURADO at [facility]   • OTHER ABDOMINAL SURGERY      kidney biopsy   • TRACHEOSTOMY         Family history   Family History   Problem Relation Age of Onset   • Heart Disease Mother    • Cancer Father        Medications:   Outpatient Medications Marked as Taking for the 9/22/20 encounter (Office  Visit) with Quinn Chandler M.D.   Medication Sig Dispense Refill   • [START ON 9/24/2020] Oxycodone HCl 20 MG Tab Take 1 Tab by mouth 4 times a day as needed for up to 28 days. 108 Tab 0   • [START ON 10/22/2020] Oxycodone HCl 20 MG Tab Take 1 Tab by mouth 4 times a day as needed (pain) for up to 28 days. 108 Tab 0   • [START ON 9/24/2020] pregabalin (LYRICA) 50 MG capsule Take 1 Cap by mouth 2 times a day for 30 days. 60 Cap 1   • promethazine (PHENERGAN) 25 MG Tab TAKE 1 TABLET BY MOUTH EVERY 8 HOURS AS NEEDED FOR NAUSEA OR VOMITING 60 Tab 1   • VELPHORO 500 MG Chew Tab TAKE 2 TABLETS BY MOUTH THREE TIMES DAILY WITH EACH MEALS 180 Tab 3   • tizanidine (ZANAFLEX) 4 MG Tab TAKE 1 TABLET BY MOUTH EVERY 6 HOURS AS NEEDED FOR MUSCLE SPASMS 180 Tab 1   • amLODIPine (NORVASC) 10 MG Tab Take 1 Tab by mouth every day. 90 Tab 3   • fluticasone (FLONASE) 50 MCG/ACT nasal spray SHAKE LIQUID AND USE 1 SPRAY IN EACH NOSTRIL EVERY DAY 48 g 3   • traZODone (DESYREL) 50 MG Tab Take 1 Tab by mouth every bedtime. 90 Tab 1   • Naloxone (NARCAN) 4 MG/0.1ML Liquid Spray 4 mg in nose as needed (For severe sleepiness or difficulty breathing from possible overdose. Call 911 after administration.).     • lacosamide (VIMPAT) 100 MG Tab tablet Vimpat 100 mg tablet     • valACYclovir (VALTREX) 500 MG Tab Take 1 Tab by mouth 2 times a day as needed (cold sores). 60 Tab 1   • Cholecalciferol (VITAMIN D3) 52996 UNIT Cap Take 10,000 Units by mouth every day.     • lidocaine (LIDODERM) 5 % Patch Apply 1 Patch to skin as directed as needed (For back pain). On for 12 hours off for 12 hours      • docusate sodium (COLACE) 100 MG Cap Take 100 mg by mouth every day.     • omeprazole (PRILOSEC) 20 MG delayed-release capsule Take 1 Cap by mouth every day. 30 Cap 3   • ferrous sulfate 325 (65 FE) MG tablet Take 325 mg by mouth every day.     • ascorbic acid (ASCORBIC ACID) 500 MG Tab Take 500 mg by mouth every day.     • hydroxychloroquine (PLAQUENIL)  200 MG Tab Take 200 mg by mouth every bedtime.         Allergies:   Allergies   Allergen Reactions   • Lorazepam [Ativan] Anxiety     Patient becomes severely paranoid and agitated   • Morphine Itching     Tolerates Dilaudid   • Seasonal Runny Nose and Itching     Hay fever, sabiha brush       Social Hx:   Social History     Socioeconomic History   • Marital status: Single     Spouse name: Not on file   • Number of children: Not on file   • Years of education: Not on file   • Highest education level: Not on file   Occupational History   • Not on file   Social Needs   • Financial resource strain: Not on file   • Food insecurity     Worry: Not on file     Inability: Not on file   • Transportation needs     Medical: Not on file     Non-medical: Not on file   Tobacco Use   • Smoking status: Former Smoker     Packs/day: 0.50     Years: 18.00     Pack years: 9.00     Types: Cigarettes     Start date:      Quit date: 2011     Years since quittin.2   • Smokeless tobacco: Never Used   • Tobacco comment: started at 15   Substance and Sexual Activity   • Alcohol use: No     Alcohol/week: 0.0 oz     Frequency: Never     Binge frequency: Never   • Drug use: No     Types: Marijuana   • Sexual activity: Not Currently     Partners: Male   Lifestyle   • Physical activity     Days per week: Not on file     Minutes per session: Not on file   • Stress: Not on file   Relationships   • Social connections     Talks on phone: Not on file     Gets together: Not on file     Attends Yazdanism service: Not on file     Active member of club or organization: Not on file     Attends meetings of clubs or organizations: Not on file     Relationship status: Not on file   • Intimate partner violence     Fear of current or ex partner: Not on file     Emotionally abused: Not on file     Physically abused: Not on file     Forced sexual activity: Not on file   Other Topics Concern   •  Service No   • Blood Transfusions Yes   • Caffeine  "Concern No   • Occupational Exposure No   • Hobby Hazards No   • Sleep Concern Yes   • Stress Concern Yes   • Weight Concern Yes   • Special Diet Yes   • Back Care No   • Exercise Yes   • Bike Helmet No   • Seat Belt Yes   • Self-Exams Yes   Social History Narrative   • Not on file       EXAMINATION     Physical Exam:   Vitals: /62 (BP Location: Left arm, Patient Position: Sitting, BP Cuff Size: Small adult)   Pulse 100   Temp 37.1 °C (98.8 °F) (Temporal)   Ht 1.651 m (5' 5\")   Wt 83.1 kg (183 lb 3.2 oz)   SpO2 98%     Constitutional:   Body Habitus: Body mass index is 30.49 kg/m².  Cooperation: Fully cooperates with exam  Appearance: Well-groomed no disheveled, in no acute distress, wearing a face mask  Respiratory-  breathing comfortable on room air, no wheezing.  Cardiovascular- no edema in the lower extremities  Psychiatric- alert and oriented ×3. Normal affect.   Spine:   Tenderness to palpation over the thoracic and lumbar paraspinals bilaterally, trapezius mild.  Functional ROM of the shoulders.  Neck supple  Gait slow, steady without loss of balance.  No use of assist device.   No pain with ROM of the left hip.  Right hip with pain in internal rotation, no pain in external rotation.  No focal motor deficits in the lower extremities bilaterally      MEDICAL DECISION MAKING    DATA    Labs:     UDS:  03/12/2020 Positive for oxycodone and metabolites  12/19/2019 oxycodone and metabolites   08/22/2019 Oxycodone and metabolites (patient does not have a script for phenergan with codeine and this was negative) Therefore, consistent with medications  06/13/2019 Negative for oxycodone, positive for other oxycodone metabolites  04/16/2019 Positive for oxymorphone  01/17/2019 Oxycodone and metabolites as expected  10/02/2018 Oxycodone and metabolites, also fentanyl    01/09/2019: Hep B surface ag negative  01/08/2019: GFR 7; BUN 23 Cr 6.68, Plt 160    12/15/2018 CRP 3.03    GFR 08/21/2019, 4  08/21/2019 WBC " 4.6, Hb 10.1, Hct 31.6, Plt 156    BMP 12/16/2018  Na 136, Potassium 4.4, chloride 96, CO2 23 Glucose 83, BUN 55H, Cr 9.44H, Calcium 9.9, Anion gap 17.0H    CBC 3.3, Hb 9.6, Hct 31.3, Plt 115    Lab Results   Component Value Date/Time    HBA1C 4.9 06/07/2018 02:29 AM        Imaging:   I reviewed the following radiology reports reviewed  GAYATHRI 08/20/2019  Echocardiography Laboratory  CONCLUSIONS  LV EF    65%.  Structurally normal tricuspid valve.  Trace tricuspid regurgitation.  Port catheter noted in the SVC and right atrium.  Tip support appears bright, no obvious vegetation.  The tip of the port abuts the tricuspid valve.  Recommend the port be pulled back approximately 2 cm.  No evidence of endocarditis.    Xray hip with pelvis-left 07/28/2019  Left femoral head heterogeneous density is compatible with known diagnosis of avascular necrosis. There is no joint space narrowing or spurring    Right arthroplasty without evident complication.    Xray left foot 01/21/2019  Nondisplaced transverse fracture through the base of the fifth metatarsal.    MRI lumbar spine 1/1/19  1.  Diffusely decreased signal again seen throughout the marrow space consistent with red marrow conversion, likely secondary to chronic anemia.  2.  No significant disc bulging, central canal narrowing, or foraminal narrowing.  3.  No lumbar spine fracture or subluxation.    MRI brain 12/13/2018  1.  There is no hippocampal sclerosis.  2.  There is no evidence of cortical dysplasia or neuronal migrational abnormalities.  3.  A few nonspecific T2 hyperintensities in the supratentorial white matter likely representing nonspecific foci of gliosis, chronic ischemia and demyelination. This is unchanged since the previous MRI dated 2/23/2012.       Chest xray 06/07/2018: Minimal right-sided opacities as described above, suggesting pneumonia.               Results for orders placed during the hospital encounter of 07/19/17   MR-LUMBAR SPINE-W/O     Impression 1.  Diffuse decreased bone marrow signal intensity throughout the lumbar spine and sacrum, presumably secondary to chronic anemia.    2.  Otherwise unremarkable MRI scan of the lumbar spine without contrast.                                    DIAGNOSIS   Visit Diagnoses     ICD-10-CM   1. Peripheral polyneuropathy  G62.9   2. Avascular necrosis of bone of hip, left (HCC)  M87.052   3. Chronic bilateral low back pain without sciatica  M54.5    G89.29   4. Fibromyalgia  M79.7   5. Status post total replacement of right hip  Z96.641   6. Inflammation around joint  M77.9         ASSESSMENT and PLAN:     Debby Carrizales 40 y.o. Female returns for follow-up of her chronic pain related to lupus, AVN hips, low back and peripheral neuropathy    Debby was seen today for follow-up.    Diagnoses and all orders for this visit:    Peripheral polyneuropathy  -     Cancel: Pain Management Screen  -     Oxycodone HCl 20 MG Tab; Take 1 Tab by mouth 4 times a day as needed for up to 28 days.  -     Oxycodone HCl 20 MG Tab; Take 1 Tab by mouth 4 times a day as needed (pain) for up to 28 days.  -     pregabalin (LYRICA) 50 MG capsule; Take 1 Cap by mouth 2 times a day for 30 days.  -     Consent for Opiate Prescription  -     Controlled Substance Treatment Agreement  -     Pain Management Screen    Avascular necrosis of bone of hip, left (HCC)  -     Oxycodone HCl 20 MG Tab; Take 1 Tab by mouth 4 times a day as needed for up to 28 days.  -     Oxycodone HCl 20 MG Tab; Take 1 Tab by mouth 4 times a day as needed (pain) for up to 28 days.  -     Consent for Opiate Prescription  -     Controlled Substance Treatment Agreement  -     Pain Management Screen    Chronic bilateral low back pain without sciatica  -     Cancel: Pain Management Screen  -     Oxycodone HCl 20 MG Tab; Take 1 Tab by mouth 4 times a day as needed for up to 28 days.  -     Oxycodone HCl 20 MG Tab; Take 1 Tab by mouth 4 times a day as needed (pain)  "for up to 28 days.  -     Consent for Opiate Prescription  -     Controlled Substance Treatment Agreement  -     Pain Management Screen    Fibromyalgia  -     pregabalin (LYRICA) 50 MG capsule; Take 1 Cap by mouth 2 times a day for 30 days.  -     CRP HIGH SENSITIVE (CARDIAC); Future  -     Sed Rate; Future  -     CBC WITH DIFFERENTIAL; Future    Status post total replacement of right hip  -     CRP HIGH SENSITIVE (CARDIAC); Future  -     Sed Rate; Future  -     CBC WITH DIFFERENTIAL; Future    Inflammation around joint  -     CRP HIGH SENSITIVE (CARDIAC); Future  -     Sed Rate; Future           1. Check CBC, CRP and sed rates.  2. Continue lyrica 50mg po bid  3. Reviewed  and UDS.  Plan to recheck UDS, changed to saliva test.  4. Continue oxycodone.  Discussed continuing at the same dose for now.  Script given for this month and next.  5. Follow-up with PCP and specialists.       In prescribing controlled substances to this patient, I certify that I have obtained and reviewed the medical history of Debby Carrizales. I have also made a good ly effort to obtain applicable records from other providers who have treated the patient and records demonstrating the following: report of \"drug-seeking behavior,\" although I suspect that she has organic reasons for pain and will continue to monitor as appropriate.     I have conducted a physical exam and documented it. I have reviewed Ms. Carrizales’s prescription history as maintained by the [place] Prescription Monitoring Program.     I have assessed the patient’s risk for abuse, dependency, and addiction using the validated Opioid Risk Tool available at https://www.mdcalc.com/bazmmt-iqsk-olgp-ort-narcotic-abuse.     Given the above, I believe the benefits of controlled substance therapy outweigh the risks. The reasons for prescribing controlled substances include non-narcotic, oral analgesic alternatives have been inadequate for pain control and in my " professional opinion, controlled substances are the only reasonable choice for this patient because she has limitations to medication choices due to being on dialysis.   Accordingly, I have discussed the risk and benefits, treatment plan, and alternative therapies with the patient.       Follow up: 2 months      Please note that this dictation was created using voice recognition software. I have made every reasonable attempt to correct obvious errors but there may be errors of grammar and content that I may have overlooked prior to finalization of this note.      Quinn Chandler MD  Interventional Spine and Sports Physiatry  Physical Medicine and Rehabilitation  [facility]

## 2020-10-16 RX ORDER — TRAZODONE HYDROCHLORIDE 50 MG/1
50 TABLET ORAL
Qty: 90 TAB | Refills: 1 | Status: ON HOLD | OUTPATIENT
Start: 2020-10-16 | End: 2020-12-08

## 2020-10-20 RX ORDER — PROMETHAZINE HYDROCHLORIDE 25 MG/1
TABLET ORAL
Qty: 60 TAB | Refills: 1 | Status: SHIPPED | OUTPATIENT
Start: 2020-10-20 | End: 2020-12-15

## 2020-11-03 RX ORDER — TIZANIDINE 4 MG/1
TABLET ORAL
Qty: 180 TAB | Refills: 1 | Status: ON HOLD | OUTPATIENT
Start: 2020-11-03 | End: 2020-12-08

## 2020-11-05 NOTE — PROGRESS NOTES
"""Grade 1 2 1/2hr HD started @ 2117 and ended @ 2348,bp elevated @ the end, twitching of arms and legs noted during tx,primary RN notified.RUAAVG + B/T,cannulation sites covered with DD,CDI.Net [id number].

## 2020-11-17 ENCOUNTER — TELEMEDICINE (OUTPATIENT)
Dept: PHYSICAL MEDICINE AND REHAB | Facility: MEDICAL CENTER | Age: 40
End: 2020-11-17
Payer: MEDICARE

## 2020-11-17 VITALS
TEMPERATURE: 97.7 F | HEIGHT: 65 IN | WEIGHT: 180.78 LBS | DIASTOLIC BLOOD PRESSURE: 77 MMHG | SYSTOLIC BLOOD PRESSURE: 152 MMHG | BODY MASS INDEX: 30.12 KG/M2

## 2020-11-17 DIAGNOSIS — E55.9 VITAMIN D DEFICIENCY DISEASE: ICD-10-CM

## 2020-11-17 DIAGNOSIS — Z99.2 ESRD (END STAGE RENAL DISEASE) ON DIALYSIS (HCC): ICD-10-CM

## 2020-11-17 DIAGNOSIS — G89.29 CHRONIC BILATERAL LOW BACK PAIN WITHOUT SCIATICA: ICD-10-CM

## 2020-11-17 DIAGNOSIS — Z96.641 STATUS POST TOTAL REPLACEMENT OF RIGHT HIP: ICD-10-CM

## 2020-11-17 DIAGNOSIS — G62.9 PERIPHERAL POLYNEUROPATHY: ICD-10-CM

## 2020-11-17 DIAGNOSIS — M87.052 AVASCULAR NECROSIS OF BONE OF HIP, LEFT (HCC): ICD-10-CM

## 2020-11-17 DIAGNOSIS — N18.6 ESRD (END STAGE RENAL DISEASE) ON DIALYSIS (HCC): ICD-10-CM

## 2020-11-17 DIAGNOSIS — F11.90 CHRONIC, CONTINUOUS USE OF OPIOIDS: ICD-10-CM

## 2020-11-17 DIAGNOSIS — M54.50 CHRONIC BILATERAL LOW BACK PAIN WITHOUT SCIATICA: ICD-10-CM

## 2020-11-17 PROCEDURE — 99214 OFFICE O/P EST MOD 30 MIN: CPT | Mod: 95,CR | Performed by: PHYSICAL MEDICINE & REHABILITATION

## 2020-11-17 RX ORDER — OXYCODONE HYDROCHLORIDE 20 MG/1
1 TABLET ORAL 4 TIMES DAILY PRN
Qty: 108 TAB | Refills: 0 | Status: SHIPPED | OUTPATIENT
Start: 2020-11-17 | End: 2020-12-15 | Stop reason: SDUPTHER

## 2020-11-17 RX ORDER — PREGABALIN 50 MG/1
50 CAPSULE ORAL 2 TIMES DAILY
Qty: 56 CAP | Refills: 0 | Status: SHIPPED | OUTPATIENT
Start: 2020-11-17 | End: 2020-12-15 | Stop reason: SDUPTHER

## 2020-11-17 RX ORDER — PREGABALIN 50 MG/1
50 CAPSULE ORAL 2 TIMES DAILY
Qty: 56 CAP | Refills: 0 | Status: ON HOLD | OUTPATIENT
Start: 2020-12-15 | End: 2020-12-08

## 2020-11-17 RX ORDER — OXYCODONE HYDROCHLORIDE 20 MG/1
1 TABLET ORAL 4 TIMES DAILY PRN
Qty: 108 TAB | Refills: 0 | Status: ON HOLD | OUTPATIENT
Start: 2020-12-15 | End: 2020-12-08

## 2020-11-17 ASSESSMENT — PAIN SCALES - GENERAL: PAINLEVEL: 7=MODERATE-SEVERE PAIN

## 2020-11-17 ASSESSMENT — FIBROSIS 4 INDEX: FIB4 SCORE: 1.055555555555555556

## 2020-11-17 ASSESSMENT — PATIENT HEALTH QUESTIONNAIRE - PHQ9: CLINICAL INTERPRETATION OF PHQ2 SCORE: 0

## 2020-11-17 NOTE — PROGRESS NOTES
Telemedicine: Established Patient   This evaluation was conducted via Zoom using secure and encrypted videoconferencing technology. The patient was physically located in [place], [place]    The patient's identity was confirmed and verbal consent was obtained for this telemedicine encounter.    Subjective:   CC:   Chief Complaint   Patient presents with   • Follow-Up     Shoulder and back pain       Debby Carrizales is a 40 y.o. female presenting for evaluation and management of chronic pain complaints.      She reports that since the last visit, she was placed on antibiotics for graft site infection.  It is improving, she has just finished the antibiotics.  No fevers for the last 7-10 days and has follow-up planned to assess function of her graft.    Her back pain continues to be bothersome.  Generally, she has tolerated taking lyrica twice a day.  Very rarely, she has taken a third dose on dialysis days.  This happens, maybe once a week.  She has some lyrica remaining due to the fact that we have been gradually reducing her medications.  Oxycodone 20mg, she takes #108 for 28 days.  No side effects.     She has not been as active lately.  She has not been walking with her grandmother.    Bowel movements are regular, she takes OTC miralax prn.      ROS   See HPI, recent infection, otherwise unchanged since 09/22/2020      Allergies   Allergen Reactions   • Lorazepam [Ativan] Anxiety     Patient becomes severely paranoid and agitated   • Morphine Itching     Tolerates Dilaudid   • Seasonal Runny Nose and Itching     Hay fever, sabiha brush       Current medicines (including changes today)  Current Outpatient Medications   Medication Sig Dispense Refill   • Oxycodone HCl 20 MG Tab Take 1 Tab by mouth 4 times a day as needed (pain) for up to 28 days. 108 Tab 0   • [START ON 12/15/2020] Oxycodone HCl 20 MG Tab Take 1 Tab by mouth 4 times a day as needed (pain) for up to 28 days. 108 Tab 0   • pregabalin (LYRICA) 50 MG  capsule Take 1 Cap by mouth 2 times a day for 28 days. 56 Cap 0   • [START ON 12/15/2020] pregabalin (LYRICA) 50 MG capsule Take 1 Cap by mouth 2 times a day for 28 days. 56 Cap 0   • tizanidine (ZANAFLEX) 4 MG Tab TAKE 1 TABLET BY MOUTH EVERY 6 HOURS AS NEEDED FOR MUSCLE SPASMS 180 Tab 1   • promethazine (PHENERGAN) 25 MG Tab TAKE 1 TABLET BY MOUTH EVERY 8 HOURS AS NEEDED FOR NAUSEA OR VOMITING 60 Tab 1   • traZODone (DESYREL) 50 MG Tab Take 1 Tab by mouth every bedtime. 90 Tab 1   • VELPHORO 500 MG Chew Tab TAKE 2 TABLETS BY MOUTH THREE TIMES DAILY WITH EACH MEALS 180 Tab 3   • amLODIPine (NORVASC) 10 MG Tab Take 1 Tab by mouth every day. 90 Tab 3   • fluticasone (FLONASE) 50 MCG/ACT nasal spray SHAKE LIQUID AND USE 1 SPRAY IN EACH NOSTRIL EVERY DAY 48 g 3   • Naloxone (NARCAN) 4 MG/0.1ML Liquid Spray 4 mg in nose as needed (For severe sleepiness or difficulty breathing from possible overdose. Call 911 after administration.).     • lacosamide (VIMPAT) 100 MG Tab tablet Vimpat 100 mg tablet     • valACYclovir (VALTREX) 500 MG Tab Take 1 Tab by mouth 2 times a day as needed (cold sores). 60 Tab 1   • Cholecalciferol (VITAMIN D3) 82441 UNIT Cap Take 10,000 Units by mouth every day.     • lidocaine (LIDODERM) 5 % Patch Apply 1 Patch to skin as directed as needed (For back pain). On for 12 hours off for 12 hours      • docusate sodium (COLACE) 100 MG Cap Take 100 mg by mouth every day.     • omeprazole (PRILOSEC) 20 MG delayed-release capsule Take 1 Cap by mouth every day. 30 Cap 3   • ferrous sulfate 325 (65 FE) MG tablet Take 325 mg by mouth every day.     • ascorbic acid (ASCORBIC ACID) 500 MG Tab Take 500 mg by mouth every day.     • hydroxychloroquine (PLAQUENIL) 200 MG Tab Take 200 mg by mouth every bedtime.       No current facility-administered medications for this visit.        Patient Active Problem List    Diagnosis Date Noted   • ESRD (end stage renal disease) on dialysis ([facility]) 10/10/2016     Priority: High    • Drug-seeking behavior 08/02/2015     Priority: High   • ESBL (extended spectrum beta-lactamase) producing bacteria infection 08/25/2014     Priority: High   • Lupus (systemic lupus erythematosus) ([facility]) 01/09/2016     Priority: Medium   • Chronic lupus nephritis ([facility]) 12/01/2013     Priority: Medium   • DAVI (obstructive sleep apnea) 07/18/2011     Priority: Medium   • Anemia in chronic kidney disease, on chronic dialysis ([facility]) 01/01/2019     Priority: Low   • Low back pain 07/19/2017     Priority: Low   • A-V fistula ([facility]) 04/21/2017     Priority: Low   • Hypertension 02/06/2017     Priority: Low   • Thrombocytopenia (CMS-HCC) 01/19/2015     Priority: Low   • Opioid dependence ([facility]) 12/05/2013     Priority: Low   • Fibromyalgia 02/10/2012     Priority: Low   • Anxiety 02/10/2012     Priority: Low   • Seizure disorder ([facility]) 10/02/2019   • Closed nondisplaced fracture of base of fifth metacarpal bone of left hand 02/01/2019   • Moderate episode of recurrent major depressive disorder ([facility]) 02/01/2019   • Research study patient 04/18/2018   • Vitamin D deficiency disease 12/11/2013       Family History   Problem Relation Age of Onset   • Heart Disease Mother    • Cancer Father        She  has a past medical history of Arthritis, Avascular necrosis of bones of both hips ([facility]) (10/10/2016), Clostridium difficile colitis (5/3/2011), Dialysis patient, ESBL (extended spectrum beta-lactamase) producing bacteria infection (8/25/2014), Fibromyalgia, Hypertension, Lupus ([facility]), Pneumonia (), Psychiatric disorder, Pyelonephritis (11/21/2017), Renal failure, Sepsis due to pneumonia ([facility]) (6/7/2018), and Status epilepticus ([facility]) (12/13/2018). She also has no past medical history of Chronic airway obstruction, not elsewhere classified or Fall.  She  has a past surgical history that includes gastroscopy-endo (4/10/2011); sclerotheraphy (4/10/2011); gastroscopy-endo (4/18/2011); colonoscopy with biopsy (4/20/2011);  "gastroscopy-endo (4/27/2011); other (5/2011); other abdominal surgery; av fistula creation (9/9/2014); cath placement (9/9/2014); av fistula creation (11/14/2014); av fistula creation (2/3/2015); hip arthroplasty total (Right, 1/18/2016); closed reduction (Right, 7/5/2016); av fistula creation (Right, 7/12/2016); thrombectomy (Right, 8/20/2016); thrombectomy (Right, 8/21/2016); angioplasty balloon (8/21/2016); tracheostomy; av fistula creation (Right, 12/9/2017); thrombectomy (12/9/2017); av fistula revision (Right, 8/11/2018); av fistula thrombolysis (Right, 8/11/2018); and tahir (N/A, 8/20/2019).       Objective:   /77   Temp 36.5 °C (97.7 °F)   Ht 1.651 m (5' 5\")   Wt 82 kg (180 lb 12.4 oz)   BMI 30.08 kg/m²     Physical Exam     Gen: Patient is alert and cooperative and in no acute distress  HEENT: No facial asymmetry  Neck: Supple  Patient is sitting in her car      Assessment and Plan:   The following treatment plan was discussed:     1. Peripheral polyneuropathy  - Oxycodone HCl 20 MG Tab; Take 1 Tab by mouth 4 times a day as needed (pain) for up to 28 days.  Dispense: 108 Tab; Refill: 0  - Oxycodone HCl 20 MG Tab; Take 1 Tab by mouth 4 times a day as needed (pain) for up to 28 days.  Dispense: 108 Tab; Refill: 0  - pregabalin (LYRICA) 50 MG capsule; Take 1 Cap by mouth 2 times a day for 28 days.  Dispense: 56 Cap; Refill: 0  - pregabalin (LYRICA) 50 MG capsule; Take 1 Cap by mouth 2 times a day for 28 days.  Dispense: 56 Cap; Refill: 0    2. Avascular necrosis of bone of hip, left (HCC)  - Oxycodone HCl 20 MG Tab; Take 1 Tab by mouth 4 times a day as needed (pain) for up to 28 days.  Dispense: 108 Tab; Refill: 0  - Oxycodone HCl 20 MG Tab; Take 1 Tab by mouth 4 times a day as needed (pain) for up to 28 days.  Dispense: 108 Tab; Refill: 0    3. ESRD (end stage renal disease) on dialysis (HCC)    4. Chronic, continuous use of opioids    5. Chronic bilateral low back pain without sciatica  - Oxycodone " HCl 20 MG Tab; Take 1 Tab by mouth 4 times a day as needed (pain) for up to 28 days.  Dispense: 108 Tab; Refill: 0  - Oxycodone HCl 20 MG Tab; Take 1 Tab by mouth 4 times a day as needed (pain) for up to 28 days.  Dispense: 108 Tab; Refill: 0    6. Vitamin D deficiency disease    7. Status post total replacement of right hip      1. Discussed that she did not have the chance to get the labwork done that I had ordered at the last visit with ESR, CRP and CBC.  We discussed that she will get this, understanding that she has had a recent infection, but that if the labs are wildly abnormal, it might still signal underlying infection.  Discussed that we will CC her PCP, Dr. Manzano and Nephrologist.  2. Discussed continuing lyrica 50mg po bid.  She is rarely taking a 3rd pill.  No change in script number at this time, she will let me know if this starts to be an issue, as this is not happening regularly   3. Continue oxycodone 20mg #108 for the month.  No side effects reported, she does maintain regular bowel movements with miralax.  4. Continue activity as tolerated.  5. Continue vitamin D supplementation    Follow-up: Return in about 8 weeks (around 1/12/2021).

## 2020-12-02 ENCOUNTER — HOSPITAL ENCOUNTER (EMERGENCY)
Facility: MEDICAL CENTER | Age: 40
End: 2020-12-02
Attending: EMERGENCY MEDICINE
Payer: MEDICARE

## 2020-12-02 VITALS
RESPIRATION RATE: 17 BRPM | HEART RATE: 87 BPM | HEIGHT: 65 IN | WEIGHT: 185.41 LBS | OXYGEN SATURATION: 95 % | DIASTOLIC BLOOD PRESSURE: 96 MMHG | SYSTOLIC BLOOD PRESSURE: 155 MMHG | TEMPERATURE: 99.1 F | BODY MASS INDEX: 30.89 KG/M2

## 2020-12-02 DIAGNOSIS — Z78.9 PROBLEM WITH VASCULAR ACCESS: ICD-10-CM

## 2020-12-02 PROCEDURE — 99283 EMERGENCY DEPT VISIT LOW MDM: CPT

## 2020-12-02 ASSESSMENT — FIBROSIS 4 INDEX: FIB4 SCORE: 1.055555555555555556

## 2020-12-03 NOTE — ED PROVIDER NOTES
ED Provider Note    CHIEF COMPLAINT  Chief Complaint   Patient presents with   • Vascular Access Problem     Pt went for dialysis today, was told her fistula was clotted and unable to be used.        HPI  Debby Carrizales is a 40 y.o. female who presents with a report that she went in for dialysis today and was unable to be dialyzed because her right brachial shunt was clotted off.  She was told to come to the emergency department for evaluation and treatment plan.  She has also been told that she has a definitive plan of care in place with intervention planned for Tuesday for catheter placement for temporary dialysis measures on Tuesday by [facility] nephrology.  Plan for later clot retrieval eventually.  Her former vascular surgeon was Dr. Duglas MEI  Pertinent negative for fever    PAST MEDICAL HISTORY  Past Medical History:   Diagnosis Date   • Arthritis     all joints,r/t lupus   • Avascular necrosis of bones of both hips ([facility]) 10/10/2016   • Clostridium difficile colitis 5/3/2011   • Dialysis patient    • ESBL (extended spectrum beta-lactamase) producing bacteria infection 8/25/2014   • Fibromyalgia    • Hypertension    • Lupus ([facility])    • Pneumonia    • Psychiatric disorder     anxiety, depression   • Pyelonephritis 11/21/2017   • Renal failure    • Sepsis due to pneumonia ([facility]) 6/7/2018   • Status epilepticus ([facility]) 12/13/2018       FAMILY HISTORY  Family History   Problem Relation Age of Onset   • Heart Disease Mother    • Cancer Father        SOCIAL HISTORY   reports that she quit smoking about 9 years ago. Her smoking use included cigarettes. She started smoking about 25 years ago. She has a 9.00 pack-year smoking history. She has never used smokeless tobacco. She reports that she does not drink alcohol or use drugs.    SURGICAL HISTORY  Past Surgical History:   Procedure Laterality Date   • GAYATHRI N/A 8/20/2019    Procedure: ECHOCARDIOGRAM, TRANSESOPHAGEAL;  Surgeon: Marta Elaine M.D.;   Location: [facility];  Service: Cardiac   • AV FISTULA REVISION Right 8/11/2018    Procedure: AV FISTULA REVISION- Graft and Thrombectomy;  Surgeon: Shabbir Ardon M.D.;  Location: [facility];  Service: General   • AV FISTULA THROMBOLYSIS Right 8/11/2018    Procedure: AV FISTULA THROMBOLYSIS;  Surgeon: Shabbir Ardon M.D.;  Location: SURGERY [facility];  Service: General   • AV FISTULA CREATION Right 12/9/2017    Procedure: AV FISTULA CREATION-ARM FOR GRAFT;  Surgeon: Lesly Jansen M.D.;  Location: [facility];  Service: General   • THROMBECTOMY  12/9/2017    Procedure: THROMBECTOMY;  Surgeon: Lesly Jansen M.D.;  Location: [facility];  Service: General   • THROMBECTOMY Right 8/21/2016    Procedure: THROMBECTOMY - right AV fistula graft with grams;  Surgeon: Shabbir Ardon M.D.;  Location: [facility];  Service:    • ANGIOPLASTY BALLOON  8/21/2016    Procedure: ANGIOPLASTY BALLOON;  Surgeon: Shabbir Ardon M.D.;  Location: [facility];  Service:    • THROMBECTOMY Right 8/20/2016    Procedure: THROMBECTOMY AV GRAFT;  Surgeon: Shabbir Ardon M.D.;  Location: [facility];  Service:    • AV FISTULA CREATION Right 7/12/2016    Procedure: AV FISTULA CREATION WITH GRAFT BRACHIAL AXILLARY;  Surgeon: Shabbir Ardon M.D.;  Location: [facility];  Service:    • CLOSED REDUCTION Right 7/5/2016    Procedure: CLOSED REDUCTION- Hip ;  Surgeon: Michael Holman M.D.;  Location: [facility];  Service:    • HIP ARTHROPLASTY TOTAL Right 1/18/2016    Procedure: HIP ARTHROPLASTY TOTAL;  Surgeon: Michael Holman M.D.;  Location: [facility];  Service:    • AV FISTULA CREATION  2/3/2015    Performed by Shabbir Ardon M.D. at [facility]   • AV FISTULA CREATION  11/14/2014    Performed by Shabbir Ardon M.D. at [facility]   • AV FISTULA CREATION  9/9/2014  "   Performed by Shabbir Ardon M.D. at SURGERY [facility]   • CATH PLACEMENT  9/9/2014    Performed by Shabbir Ardon M.D. at SURGERY [facility]   • OTHER  5/2011    tracheostomy   • GASTROSCOPY-ENDO  4/27/2011    Performed by PALMIRA DOAN at ENDOSCOPY [facility]   • COLONOSCOPY WITH BIOPSY  4/20/2011    Performed by ALCIRA CRUZ at ENDOSCOPY [facility]   • GASTROSCOPY-ENDO  4/18/2011    Performed by ALCIRA CRUZ at ENDOSCOPY [facility]   • GASTROSCOPY-ENDO  4/10/2011    Performed by SYED JURADO at ENDOSCOPY [facility]   • SCLEROTHERAPHY  4/10/2011    Performed by SYED JURADO at ENDOSCOPY [facility]   • OTHER ABDOMINAL SURGERY      kidney biopsy   • TRACHEOSTOMY         CURRENT MEDICATIONS  Home Medications    **Home medications have not yet been reviewed for this encounter**         ALLERGIES  Allergies   Allergen Reactions   • Lorazepam [Ativan] Anxiety     Patient becomes severely paranoid and agitated   • Morphine Itching     Tolerates Dilaudid   • Seasonal Runny Nose and Itching     Hay fever, sabiha brush       PHYSICAL EXAM  VITAL SIGNS: /96   Pulse 87   Temp 37.3 °C (99.1 °F) (Temporal)   Resp 17   Ht 1.651 m (5' 5\")   Wt 84.1 kg (185 lb 6.5 oz)   LMP 11/18/2020   SpO2 95%   Breastfeeding No   BMI 30.85 kg/m²    Constitutional: Well developed, Well nourished, No acute distress, Non-toxic appearance.   Cardiovascular: Regular rate and rhythm without murmur  Thorax & Lungs: Clear to auscultation  Abdomen: Normal  Skin: No excoriation marks  Extremities: No peripheral edema  Musculoskeletal:   Neurologic:   Psychiatric:       COURSE & MEDICAL DECISION MAKING  Pertinent Labs & Imaging studies reviewed. (See chart for details)  There is no indication for labs or imaging at this time in this patient who is otherwise here because her brachial shunt is clotted off.  There is a plan in place for her to have a temporary dialysis " catheter placed by [facility] nephrology on Tuesday and I believe this is a reasonable plan.  I attempted to reach her vascular surgical group but failed.  I conveyed that it is reasonable if she begins to feel bad between now and Tuesday to go to [facility] for lab work and to avoid potassium at all cost until she has her next dialysis which would like to be a week from now after her catheter is placed  Patient is comfortable with this plan of care discharged in stable condition    FINAL IMPRESSION  1. Problem with vascular access            Electronically signed by: Akira Ashley M.D., 12/2/2020 5:52 PM    The note accurately reflects work and decisions made by me.  Akira Ashley M.D.  12/2/2020  5:55 PM

## 2020-12-03 NOTE — ED TRIAGE NOTES
Chief Complaint   Patient presents with   • Vascular Access Problem     Pt went for dialysis today, was told her fistula was clotted and unable to be used.

## 2020-12-03 NOTE — DISCHARGE INSTRUCTIONS
Please go to [facility] if you begin to feel bad  Avoid potassium containing food and fluids  Please have your shunt intervention on Tuesday as scheduled with [facility] nephrology

## 2020-12-03 NOTE — ED NOTES
PT given d/c paperwork and f/u instruction, pt verbalized understanding all information given. Pt ambulated out of the ER w/o difficulty

## 2020-12-04 ENCOUNTER — HOSPITAL ENCOUNTER (INPATIENT)
Facility: MEDICAL CENTER | Age: 40
LOS: 3 days | DRG: 228 | End: 2020-12-08
Attending: EMERGENCY MEDICINE | Admitting: STUDENT IN AN ORGANIZED HEALTH CARE EDUCATION/TRAINING PROGRAM
Payer: MEDICARE

## 2020-12-04 ENCOUNTER — APPOINTMENT (OUTPATIENT)
Dept: RADIOLOGY | Facility: MEDICAL CENTER | Age: 40
DRG: 228 | End: 2020-12-04
Attending: EMERGENCY MEDICINE
Payer: MEDICARE

## 2020-12-04 DIAGNOSIS — T82.898D ARTERIOVENOUS FISTULA OCCLUSION, SUBSEQUENT ENCOUNTER: ICD-10-CM

## 2020-12-04 DIAGNOSIS — I10 UNCONTROLLED HYPERTENSION: ICD-10-CM

## 2020-12-04 DIAGNOSIS — E87.5 HYPERKALEMIA: ICD-10-CM

## 2020-12-04 DIAGNOSIS — Z78.9 PROBLEM WITH VASCULAR ACCESS: ICD-10-CM

## 2020-12-04 DIAGNOSIS — Z99.2 ESRD (END STAGE RENAL DISEASE) ON DIALYSIS (HCC): ICD-10-CM

## 2020-12-04 DIAGNOSIS — M32.9 HISTORY OF SYSTEMIC LUPUS ERYTHEMATOSUS (HCC): ICD-10-CM

## 2020-12-04 DIAGNOSIS — M32.14: ICD-10-CM

## 2020-12-04 DIAGNOSIS — N18.6 CHRONIC KIDNEY DISEASE ON CHRONIC DIALYSIS (HCC): ICD-10-CM

## 2020-12-04 DIAGNOSIS — Z99.2 CHRONIC KIDNEY DISEASE ON CHRONIC DIALYSIS (HCC): ICD-10-CM

## 2020-12-04 DIAGNOSIS — N18.6 ESRD (END STAGE RENAL DISEASE) ON DIALYSIS (HCC): ICD-10-CM

## 2020-12-04 LAB
ALBUMIN SERPL BCP-MCNC: 4 G/DL (ref 3.2–4.9)
ALBUMIN/GLOB SERPL: 1.4 G/DL
ALP SERPL-CCNC: 103 U/L (ref 30–99)
ALT SERPL-CCNC: 7 U/L (ref 2–50)
ANION GAP SERPL CALC-SCNC: 22 MMOL/L (ref 7–16)
APTT PPP: 38.5 SEC (ref 24.7–36)
AST SERPL-CCNC: 8 U/L (ref 12–45)
BASOPHILS # BLD AUTO: 1.7 % (ref 0–1.8)
BASOPHILS # BLD: 0.09 K/UL (ref 0–0.12)
BILIRUB SERPL-MCNC: 0.2 MG/DL (ref 0.1–1.5)
BUN SERPL-MCNC: 87 MG/DL (ref 8–22)
CALCIUM SERPL-MCNC: 8.2 MG/DL (ref 8.5–10.5)
CHLORIDE SERPL-SCNC: 93 MMOL/L (ref 96–112)
CO2 SERPL-SCNC: 18 MMOL/L (ref 20–33)
COVID ORDER STATUS COVID19: NORMAL
CREAT SERPL-MCNC: 20.42 MG/DL (ref 0.5–1.4)
EKG IMPRESSION: NORMAL
EOSINOPHIL # BLD AUTO: 0.1 K/UL (ref 0–0.51)
EOSINOPHIL NFR BLD: 1.9 % (ref 0–6.9)
ERYTHROCYTE [DISTWIDTH] IN BLOOD BY AUTOMATED COUNT: 41.6 FL (ref 35.9–50)
FLUAV RNA SPEC QL NAA+PROBE: NEGATIVE
FLUBV RNA SPEC QL NAA+PROBE: NEGATIVE
GLOBULIN SER CALC-MCNC: 2.9 G/DL (ref 1.9–3.5)
GLUCOSE BLD-MCNC: 108 MG/DL (ref 65–99)
GLUCOSE BLD-MCNC: 138 MG/DL (ref 65–99)
GLUCOSE BLD-MCNC: 48 MG/DL (ref 65–99)
GLUCOSE SERPL-MCNC: 78 MG/DL (ref 65–99)
HCT VFR BLD AUTO: 30.1 % (ref 37–47)
HGB BLD-MCNC: 9.9 G/DL (ref 12–16)
IMM GRANULOCYTES # BLD AUTO: 0.04 K/UL (ref 0–0.11)
IMM GRANULOCYTES NFR BLD AUTO: 0.8 % (ref 0–0.9)
INR PPP: 1.07 (ref 0.87–1.13)
LYMPHOCYTES # BLD AUTO: 1.39 K/UL (ref 1–4.8)
LYMPHOCYTES NFR BLD: 26.6 % (ref 22–41)
MCH RBC QN AUTO: 32.2 PG (ref 27–33)
MCHC RBC AUTO-ENTMCNC: 32.9 G/DL (ref 33.6–35)
MCV RBC AUTO: 98 FL (ref 81.4–97.8)
MONOCYTES # BLD AUTO: 0.64 K/UL (ref 0–0.85)
MONOCYTES NFR BLD AUTO: 12.2 % (ref 0–13.4)
NEUTROPHILS # BLD AUTO: 2.97 K/UL (ref 2–7.15)
NEUTROPHILS NFR BLD: 56.8 % (ref 44–72)
NRBC # BLD AUTO: 0.02 K/UL
NRBC BLD-RTO: 0.4 /100 WBC
NT-PROBNP SERPL IA-MCNC: ABNORMAL PG/ML (ref 0–125)
PLATELET # BLD AUTO: 100 K/UL (ref 164–446)
PMV BLD AUTO: 10.6 FL (ref 9–12.9)
POTASSIUM SERPL-SCNC: 7.3 MMOL/L (ref 3.6–5.5)
PROT SERPL-MCNC: 6.9 G/DL (ref 6–8.2)
PROTHROMBIN TIME: 14.2 SEC (ref 12–14.6)
RBC # BLD AUTO: 3.07 M/UL (ref 4.2–5.4)
RSV RNA SPEC QL NAA+PROBE: NEGATIVE
SARS-COV-2 RNA RESP QL NAA+PROBE: NOTDETECTED
SODIUM SERPL-SCNC: 133 MMOL/L (ref 135–145)
SPECIMEN SOURCE: NORMAL
WBC # BLD AUTO: 5.2 K/UL (ref 4.8–10.8)

## 2020-12-04 PROCEDURE — 83880 ASSAY OF NATRIURETIC PEPTIDE: CPT

## 2020-12-04 PROCEDURE — G0378 HOSPITAL OBSERVATION PER HR: HCPCS

## 2020-12-04 PROCEDURE — 700101 HCHG RX REV CODE 250: Performed by: EMERGENCY MEDICINE

## 2020-12-04 PROCEDURE — 99291 CRITICAL CARE FIRST HOUR: CPT | Mod: 25 | Performed by: INTERNAL MEDICINE

## 2020-12-04 PROCEDURE — 85730 THROMBOPLASTIN TIME PARTIAL: CPT

## 2020-12-04 PROCEDURE — 700101 HCHG RX REV CODE 250

## 2020-12-04 PROCEDURE — 71045 X-RAY EXAM CHEST 1 VIEW: CPT

## 2020-12-04 PROCEDURE — 96372 THER/PROPH/DIAG INJ SC/IM: CPT

## 2020-12-04 PROCEDURE — 700111 HCHG RX REV CODE 636 W/ 250 OVERRIDE (IP): Performed by: STUDENT IN AN ORGANIZED HEALTH CARE EDUCATION/TRAINING PROGRAM

## 2020-12-04 PROCEDURE — 82962 GLUCOSE BLOOD TEST: CPT

## 2020-12-04 PROCEDURE — A9270 NON-COVERED ITEM OR SERVICE: HCPCS | Performed by: EMERGENCY MEDICINE

## 2020-12-04 PROCEDURE — 36556 INSERT NON-TUNNEL CV CATH: CPT

## 2020-12-04 PROCEDURE — 0241U HCHG SARS-COV-2 COVID-19 NFCT DS RESP RNA 4 TRGT MIC: CPT

## 2020-12-04 PROCEDURE — 36415 COLL VENOUS BLD VENIPUNCTURE: CPT

## 2020-12-04 PROCEDURE — C1751 CATH, INF, PER/CENT/MIDLINE: HCPCS

## 2020-12-04 PROCEDURE — 99220 PR INITIAL OBSERVATION CARE,LEVL III: CPT | Performed by: STUDENT IN AN ORGANIZED HEALTH CARE EDUCATION/TRAINING PROGRAM

## 2020-12-04 PROCEDURE — 700102 HCHG RX REV CODE 250 W/ 637 OVERRIDE(OP): Performed by: STUDENT IN AN ORGANIZED HEALTH CARE EDUCATION/TRAINING PROGRAM

## 2020-12-04 PROCEDURE — 80053 COMPREHEN METABOLIC PANEL: CPT

## 2020-12-04 PROCEDURE — 700111 HCHG RX REV CODE 636 W/ 250 OVERRIDE (IP): Performed by: EMERGENCY MEDICINE

## 2020-12-04 PROCEDURE — 36556 INSERT NON-TUNNEL CV CATH: CPT | Mod: RT | Performed by: INTERNAL MEDICINE

## 2020-12-04 PROCEDURE — 96365 THER/PROPH/DIAG IV INF INIT: CPT

## 2020-12-04 PROCEDURE — 700102 HCHG RX REV CODE 250 W/ 637 OVERRIDE(OP): Performed by: EMERGENCY MEDICINE

## 2020-12-04 PROCEDURE — 99285 EMERGENCY DEPT VISIT HI MDM: CPT

## 2020-12-04 PROCEDURE — 85025 COMPLETE CBC W/AUTO DIFF WBC: CPT

## 2020-12-04 PROCEDURE — 96375 TX/PRO/DX INJ NEW DRUG ADDON: CPT

## 2020-12-04 PROCEDURE — A9270 NON-COVERED ITEM OR SERVICE: HCPCS | Performed by: STUDENT IN AN ORGANIZED HEALTH CARE EDUCATION/TRAINING PROGRAM

## 2020-12-04 PROCEDURE — 5A1D70Z PERFORMANCE OF URINARY FILTRATION, INTERMITTENT, LESS THAN 6 HOURS PER DAY: ICD-10-PCS | Performed by: EMERGENCY MEDICINE

## 2020-12-04 PROCEDURE — C9803 HOPD COVID-19 SPEC COLLECT: HCPCS | Performed by: EMERGENCY MEDICINE

## 2020-12-04 PROCEDURE — 02HV33Z INSERTION OF INFUSION DEVICE INTO SUPERIOR VENA CAVA, PERCUTANEOUS APPROACH: ICD-10-PCS | Performed by: INTERNAL MEDICINE

## 2020-12-04 PROCEDURE — 85610 PROTHROMBIN TIME: CPT

## 2020-12-04 PROCEDURE — 700105 HCHG RX REV CODE 258: Performed by: EMERGENCY MEDICINE

## 2020-12-04 PROCEDURE — 93005 ELECTROCARDIOGRAM TRACING: CPT | Performed by: EMERGENCY MEDICINE

## 2020-12-04 PROCEDURE — 700111 HCHG RX REV CODE 636 W/ 250 OVERRIDE (IP): Performed by: INTERNAL MEDICINE

## 2020-12-04 RX ORDER — POLYETHYLENE GLYCOL 3350 17 G/17G
1 POWDER, FOR SOLUTION ORAL
Status: DISCONTINUED | OUTPATIENT
Start: 2020-12-04 | End: 2020-12-06

## 2020-12-04 RX ORDER — OMEPRAZOLE 20 MG/1
20 CAPSULE, DELAYED RELEASE ORAL DAILY
Status: DISCONTINUED | OUTPATIENT
Start: 2020-12-05 | End: 2020-12-08 | Stop reason: HOSPADM

## 2020-12-04 RX ORDER — FERROUS SULFATE 325(65) MG
325 TABLET ORAL DAILY
Status: DISCONTINUED | OUTPATIENT
Start: 2020-12-05 | End: 2020-12-08 | Stop reason: HOSPADM

## 2020-12-04 RX ORDER — CLONIDINE HYDROCHLORIDE 0.1 MG/1
0.2 TABLET ORAL ONCE
Status: COMPLETED | OUTPATIENT
Start: 2020-12-04 | End: 2020-12-04

## 2020-12-04 RX ORDER — AMOXICILLIN 250 MG
2 CAPSULE ORAL 2 TIMES DAILY
Status: DISCONTINUED | OUTPATIENT
Start: 2020-12-04 | End: 2020-12-06

## 2020-12-04 RX ORDER — VITAMIN B COMPLEX
1000 TABLET ORAL DAILY
Status: DISCONTINUED | OUTPATIENT
Start: 2020-12-05 | End: 2020-12-08 | Stop reason: HOSPADM

## 2020-12-04 RX ORDER — HYDROXYCHLOROQUINE SULFATE 200 MG/1
200 TABLET, FILM COATED ORAL EVERY MORNING
Status: DISCONTINUED | OUTPATIENT
Start: 2020-12-05 | End: 2020-12-08 | Stop reason: HOSPADM

## 2020-12-04 RX ORDER — DEXTROSE MONOHYDRATE 25 G/50ML
50 INJECTION, SOLUTION INTRAVENOUS ONCE
Status: DISCONTINUED | OUTPATIENT
Start: 2020-12-04 | End: 2020-12-04

## 2020-12-04 RX ORDER — ACETAMINOPHEN 325 MG/1
650 TABLET ORAL EVERY 6 HOURS PRN
Status: DISCONTINUED | OUTPATIENT
Start: 2020-12-04 | End: 2020-12-08 | Stop reason: HOSPADM

## 2020-12-04 RX ORDER — PROMETHAZINE HYDROCHLORIDE 25 MG/1
25 TABLET ORAL EVERY 8 HOURS PRN
Status: DISCONTINUED | OUTPATIENT
Start: 2020-12-04 | End: 2020-12-08 | Stop reason: HOSPADM

## 2020-12-04 RX ORDER — BISACODYL 10 MG
10 SUPPOSITORY, RECTAL RECTAL
Status: DISCONTINUED | OUTPATIENT
Start: 2020-12-04 | End: 2020-12-06

## 2020-12-04 RX ORDER — PREGABALIN 25 MG/1
50 CAPSULE ORAL 2 TIMES DAILY
Status: DISCONTINUED | OUTPATIENT
Start: 2020-12-05 | End: 2020-12-08 | Stop reason: HOSPADM

## 2020-12-04 RX ORDER — AMLODIPINE BESYLATE 10 MG/1
10 TABLET ORAL DAILY
Status: DISCONTINUED | OUTPATIENT
Start: 2020-12-04 | End: 2020-12-08 | Stop reason: HOSPADM

## 2020-12-04 RX ORDER — DEXTROSE MONOHYDRATE 25 G/50ML
INJECTION, SOLUTION INTRAVENOUS
Status: COMPLETED
Start: 2020-12-04 | End: 2020-12-04

## 2020-12-04 RX ORDER — OXYCODONE HYDROCHLORIDE 10 MG/1
20 TABLET ORAL 4 TIMES DAILY PRN
Refills: 0 | Status: DISCONTINUED | OUTPATIENT
Start: 2020-12-04 | End: 2020-12-08 | Stop reason: HOSPADM

## 2020-12-04 RX ORDER — LACOSAMIDE 100 MG/1
100 TABLET ORAL 2 TIMES DAILY
Status: DISCONTINUED | OUTPATIENT
Start: 2020-12-04 | End: 2020-12-08 | Stop reason: HOSPADM

## 2020-12-04 RX ORDER — DOCUSATE SODIUM 100 MG/1
100 CAPSULE, LIQUID FILLED ORAL DAILY
Status: DISCONTINUED | OUTPATIENT
Start: 2020-12-05 | End: 2020-12-05

## 2020-12-04 RX ORDER — HEPARIN SODIUM 5000 [USP'U]/ML
5000 INJECTION, SOLUTION INTRAVENOUS; SUBCUTANEOUS EVERY 12 HOURS
Status: DISCONTINUED | OUTPATIENT
Start: 2020-12-04 | End: 2020-12-06

## 2020-12-04 RX ORDER — TRAZODONE HYDROCHLORIDE 50 MG/1
50 TABLET ORAL
Status: DISCONTINUED | OUTPATIENT
Start: 2020-12-04 | End: 2020-12-06

## 2020-12-04 RX ORDER — TIZANIDINE 4 MG/1
4 TABLET ORAL EVERY 6 HOURS PRN
Status: DISCONTINUED | OUTPATIENT
Start: 2020-12-04 | End: 2020-12-06

## 2020-12-04 RX ORDER — ASCORBIC ACID 500 MG
500 TABLET ORAL DAILY
Status: DISCONTINUED | OUTPATIENT
Start: 2020-12-05 | End: 2020-12-06

## 2020-12-04 RX ORDER — HYDROMORPHONE HYDROCHLORIDE 1 MG/ML
0.5 INJECTION, SOLUTION INTRAMUSCULAR; INTRAVENOUS; SUBCUTANEOUS ONCE
Status: COMPLETED | OUTPATIENT
Start: 2020-12-04 | End: 2020-12-04

## 2020-12-04 RX ORDER — DEXTROSE MONOHYDRATE 25 G/50ML
50 INJECTION, SOLUTION INTRAVENOUS ONCE
Status: COMPLETED | OUTPATIENT
Start: 2020-12-04 | End: 2020-12-04

## 2020-12-04 RX ORDER — MIDAZOLAM HYDROCHLORIDE 1 MG/ML
2 INJECTION INTRAMUSCULAR; INTRAVENOUS ONCE
Status: COMPLETED | OUTPATIENT
Start: 2020-12-04 | End: 2020-12-04

## 2020-12-04 RX ORDER — FLUTICASONE PROPIONATE 50 MCG
1 SPRAY, SUSPENSION (ML) NASAL DAILY
Status: DISCONTINUED | OUTPATIENT
Start: 2020-12-05 | End: 2020-12-08 | Stop reason: HOSPADM

## 2020-12-04 RX ADMIN — CALCIUM GLUCONATE 2000 MG: 98 INJECTION, SOLUTION INTRAVENOUS at 21:50

## 2020-12-04 RX ADMIN — DEXTROSE MONOHYDRATE 50 ML: 25 INJECTION, SOLUTION INTRAVENOUS at 20:20

## 2020-12-04 RX ADMIN — SODIUM BICARBONATE 50 MEQ: 84 INJECTION, SOLUTION INTRAVENOUS at 20:16

## 2020-12-04 RX ADMIN — DEXTROSE MONOHYDRATE 50 ML: 25 INJECTION, SOLUTION INTRAVENOUS at 22:03

## 2020-12-04 RX ADMIN — HYDROMORPHONE HYDROCHLORIDE 0.5 MG: 1 INJECTION, SOLUTION INTRAMUSCULAR; INTRAVENOUS; SUBCUTANEOUS at 21:00

## 2020-12-04 RX ADMIN — MIDAZOLAM HYDROCHLORIDE 2 MG: 1 INJECTION, SOLUTION INTRAMUSCULAR; INTRAVENOUS at 21:00

## 2020-12-04 RX ADMIN — PATIROMER 8.4 G: 8.4 POWDER, FOR SUSPENSION ORAL at 22:15

## 2020-12-04 RX ADMIN — AMLODIPINE BESYLATE 10 MG: 10 TABLET ORAL at 23:29

## 2020-12-04 RX ADMIN — CLONIDINE HYDROCHLORIDE 0.2 MG: 0.1 TABLET ORAL at 20:28

## 2020-12-04 RX ADMIN — HEPARIN SODIUM 5000 UNITS: 5000 INJECTION, SOLUTION INTRAVENOUS; SUBCUTANEOUS at 23:29

## 2020-12-04 RX ADMIN — INSULIN HUMAN 10 UNITS: 100 INJECTION, SOLUTION PARENTERAL at 20:23

## 2020-12-04 RX ADMIN — LACOSAMIDE 100 MG: 100 TABLET, FILM COATED ORAL at 23:29

## 2020-12-04 ASSESSMENT — ENCOUNTER SYMPTOMS
BRUISES/BLEEDS EASILY: 0
VOMITING: 0
SORE THROAT: 0
TINGLING: 0
DEPRESSION: 0
HEADACHES: 0
WEAKNESS: 0
FOCAL WEAKNESS: 0
ABDOMINAL PAIN: 0
WHEEZING: 0
BACK PAIN: 0
TREMORS: 1
WEIGHT LOSS: 0
NERVOUS/ANXIOUS: 0
SINUS PAIN: 0
FEVER: 0
SPEECH CHANGE: 0
CHILLS: 1
NAUSEA: 0
CONSTIPATION: 0
FLANK PAIN: 1
PALPITATIONS: 0
DOUBLE VISION: 0
DIARRHEA: 0
COUGH: 0
WEAKNESS: 1
SHORTNESS OF BREATH: 0
DIZZINESS: 0
NECK PAIN: 0
BLURRED VISION: 0
CHILLS: 0
MEMORY LOSS: 0

## 2020-12-04 ASSESSMENT — LIFESTYLE VARIABLES
DO YOU DRINK ALCOHOL: NO
DOES PATIENT WANT TO STOP DRINKING: NO

## 2020-12-04 ASSESSMENT — FIBROSIS 4 INDEX
FIB4 SCORE: 1.15
FIB4 SCORE: 1.055555555555555556

## 2020-12-04 NOTE — ED TRIAGE NOTES
"Pt to triage, c/o dialysis catheter is not working, was \" sent by her doctor to have vascular access replaced or fixed\". Pt last had dialysis on Friday , states \" she feels crummy\" .   "

## 2020-12-05 PROBLEM — G62.9 PERIPHERAL POLYNEUROPATHY: Status: ACTIVE | Noted: 2020-12-05

## 2020-12-05 PROBLEM — K21.9 GERD (GASTROESOPHAGEAL REFLUX DISEASE): Status: ACTIVE | Noted: 2020-12-05

## 2020-12-05 PROBLEM — G62.9 PERIPHERAL POLYNEUROPATHY: Status: RESOLVED | Noted: 2020-12-05 | Resolved: 2020-12-05

## 2020-12-05 PROBLEM — Z95.828 PORT-A-CATH IN PLACE: Status: ACTIVE | Noted: 2020-12-05

## 2020-12-05 LAB
ALBUMIN SERPL BCP-MCNC: 3.7 G/DL (ref 3.2–4.9)
ALBUMIN/GLOB SERPL: 1.4 G/DL
ALP SERPL-CCNC: 97 U/L (ref 30–99)
ALT SERPL-CCNC: 9 U/L (ref 2–50)
ANION GAP SERPL CALC-SCNC: 14 MMOL/L (ref 7–16)
AST SERPL-CCNC: 10 U/L (ref 12–45)
BASOPHILS # BLD AUTO: 2 % (ref 0–1.8)
BASOPHILS # BLD: 0.06 K/UL (ref 0–0.12)
BILIRUB SERPL-MCNC: 0.2 MG/DL (ref 0.1–1.5)
BUN SERPL-MCNC: 32 MG/DL (ref 8–22)
CALCIUM SERPL-MCNC: 8.6 MG/DL (ref 8.5–10.5)
CHLORIDE SERPL-SCNC: 96 MMOL/L (ref 96–112)
CO2 SERPL-SCNC: 27 MMOL/L (ref 20–33)
CREAT SERPL-MCNC: 10.67 MG/DL (ref 0.5–1.4)
EOSINOPHIL # BLD AUTO: 0.04 K/UL (ref 0–0.51)
EOSINOPHIL NFR BLD: 1.3 % (ref 0–6.9)
ERYTHROCYTE [DISTWIDTH] IN BLOOD BY AUTOMATED COUNT: 40 FL (ref 35.9–50)
GLOBULIN SER CALC-MCNC: 2.6 G/DL (ref 1.9–3.5)
GLUCOSE SERPL-MCNC: 62 MG/DL (ref 65–99)
HAV IGM SERPL QL IA: NORMAL
HBV CORE IGM SER QL: NORMAL
HBV SURFACE AB SERPL IA-ACNC: <3.5 MIU/ML (ref 0–10)
HBV SURFACE AG SER QL: NORMAL
HCT VFR BLD AUTO: 29.6 % (ref 37–47)
HCV AB SER QL: NORMAL
HGB BLD-MCNC: 9.8 G/DL (ref 12–16)
IMM GRANULOCYTES # BLD AUTO: 0.02 K/UL (ref 0–0.11)
IMM GRANULOCYTES NFR BLD AUTO: 0.7 % (ref 0–0.9)
LYMPHOCYTES # BLD AUTO: 1.04 K/UL (ref 1–4.8)
LYMPHOCYTES NFR BLD: 34.7 % (ref 22–41)
MCH RBC QN AUTO: 31.7 PG (ref 27–33)
MCHC RBC AUTO-ENTMCNC: 33.1 G/DL (ref 33.6–35)
MCV RBC AUTO: 95.8 FL (ref 81.4–97.8)
MONOCYTES # BLD AUTO: 0.47 K/UL (ref 0–0.85)
MONOCYTES NFR BLD AUTO: 15.7 % (ref 0–13.4)
NEUTROPHILS # BLD AUTO: 1.37 K/UL (ref 2–7.15)
NEUTROPHILS NFR BLD: 45.6 % (ref 44–72)
NRBC # BLD AUTO: 0 K/UL
NRBC BLD-RTO: 0 /100 WBC
PLATELET # BLD AUTO: 103 K/UL (ref 164–446)
PMV BLD AUTO: 10.6 FL (ref 9–12.9)
POTASSIUM SERPL-SCNC: 5.4 MMOL/L (ref 3.6–5.5)
PROT SERPL-MCNC: 6.3 G/DL (ref 6–8.2)
RBC # BLD AUTO: 3.09 M/UL (ref 4.2–5.4)
SODIUM SERPL-SCNC: 137 MMOL/L (ref 135–145)
WBC # BLD AUTO: 3 K/UL (ref 4.8–10.8)

## 2020-12-05 PROCEDURE — 86706 HEP B SURFACE ANTIBODY: CPT

## 2020-12-05 PROCEDURE — A9270 NON-COVERED ITEM OR SERVICE: HCPCS | Performed by: STUDENT IN AN ORGANIZED HEALTH CARE EDUCATION/TRAINING PROGRAM

## 2020-12-05 PROCEDURE — 90935 HEMODIALYSIS ONE EVALUATION: CPT

## 2020-12-05 PROCEDURE — 700111 HCHG RX REV CODE 636 W/ 250 OVERRIDE (IP): Performed by: STUDENT IN AN ORGANIZED HEALTH CARE EDUCATION/TRAINING PROGRAM

## 2020-12-05 PROCEDURE — 80074 ACUTE HEPATITIS PANEL: CPT

## 2020-12-05 PROCEDURE — 770020 HCHG ROOM/CARE - TELE (206)

## 2020-12-05 PROCEDURE — 85025 COMPLETE CBC W/AUTO DIFF WBC: CPT

## 2020-12-05 PROCEDURE — 96372 THER/PROPH/DIAG INJ SC/IM: CPT

## 2020-12-05 PROCEDURE — 80053 COMPREHEN METABOLIC PANEL: CPT

## 2020-12-05 PROCEDURE — 99232 SBSQ HOSP IP/OBS MODERATE 35: CPT | Mod: GC | Performed by: HOSPITALIST

## 2020-12-05 PROCEDURE — 700102 HCHG RX REV CODE 250 W/ 637 OVERRIDE(OP): Performed by: STUDENT IN AN ORGANIZED HEALTH CARE EDUCATION/TRAINING PROGRAM

## 2020-12-05 RX ORDER — HEPARIN SODIUM 1000 [USP'U]/ML
2400 INJECTION, SOLUTION INTRAVENOUS; SUBCUTANEOUS
Status: COMPLETED | OUTPATIENT
Start: 2020-12-05 | End: 2020-12-05

## 2020-12-05 RX ORDER — LABETALOL HYDROCHLORIDE 5 MG/ML
10 INJECTION, SOLUTION INTRAVENOUS EVERY 4 HOURS PRN
Status: DISCONTINUED | OUTPATIENT
Start: 2020-12-05 | End: 2020-12-08 | Stop reason: HOSPADM

## 2020-12-05 RX ADMIN — HEPARIN SODIUM 5000 UNITS: 5000 INJECTION, SOLUTION INTRAVENOUS; SUBCUTANEOUS at 08:31

## 2020-12-05 RX ADMIN — PREGABALIN 50 MG: 25 CAPSULE ORAL at 17:37

## 2020-12-05 RX ADMIN — OXYCODONE HYDROCHLORIDE 20 MG: 10 TABLET ORAL at 17:39

## 2020-12-05 RX ADMIN — OMEPRAZOLE 20 MG: 20 CAPSULE, DELAYED RELEASE ORAL at 05:31

## 2020-12-05 RX ADMIN — LACOSAMIDE 100 MG: 100 TABLET, FILM COATED ORAL at 17:34

## 2020-12-05 RX ADMIN — AMLODIPINE BESYLATE 10 MG: 10 TABLET ORAL at 17:36

## 2020-12-05 RX ADMIN — OXYCODONE HYDROCHLORIDE AND ACETAMINOPHEN 500 MG: 500 TABLET ORAL at 05:30

## 2020-12-05 RX ADMIN — FERROUS SULFATE TAB 325 MG (65 MG ELEMENTAL FE) 325 MG: 325 (65 FE) TAB at 05:31

## 2020-12-05 RX ADMIN — Medication 1000 UNITS: at 05:31

## 2020-12-05 RX ADMIN — HEPARIN SODIUM 5000 UNITS: 5000 INJECTION, SOLUTION INTRAVENOUS; SUBCUTANEOUS at 17:37

## 2020-12-05 RX ADMIN — HYDROXYCHLOROQUINE SULFATE 200 MG: 200 TABLET ORAL at 05:30

## 2020-12-05 RX ADMIN — OXYCODONE HYDROCHLORIDE 20 MG: 10 TABLET ORAL at 08:38

## 2020-12-05 RX ADMIN — TRAZODONE HYDROCHLORIDE 50 MG: 50 TABLET ORAL at 23:27

## 2020-12-05 RX ADMIN — LACOSAMIDE 100 MG: 100 TABLET, FILM COATED ORAL at 05:30

## 2020-12-05 RX ADMIN — HEPARIN SODIUM 2400 UNITS: 1000 INJECTION, SOLUTION INTRAVENOUS; SUBCUTANEOUS at 04:02

## 2020-12-05 RX ADMIN — PREGABALIN 50 MG: 25 CAPSULE ORAL at 05:31

## 2020-12-05 ASSESSMENT — ENCOUNTER SYMPTOMS
SHORTNESS OF BREATH: 0
PALPITATIONS: 0
FLANK PAIN: 0
TREMORS: 0
FEVER: 0
WEAKNESS: 1
GASTROINTESTINAL NEGATIVE: 1
WEIGHT LOSS: 0
RESPIRATORY NEGATIVE: 1
DEPRESSION: 0
MUSCULOSKELETAL NEGATIVE: 1
COUGH: 0
BRUISES/BLEEDS EASILY: 0
VOMITING: 0
CONSTIPATION: 0
CARDIOVASCULAR NEGATIVE: 1
ABDOMINAL PAIN: 0
EYES NEGATIVE: 1
DIZZINESS: 0
BACK PAIN: 0
PSYCHIATRIC NEGATIVE: 1
SORE THROAT: 0
CHILLS: 0
SINUS PAIN: 0
MEMORY LOSS: 0
NECK PAIN: 0
NAUSEA: 0
CONSTITUTIONAL NEGATIVE: 1
DIARRHEA: 0
NEUROLOGICAL NEGATIVE: 1
WHEEZING: 0
NERVOUS/ANXIOUS: 0
TINGLING: 0
HEADACHES: 0

## 2020-12-05 ASSESSMENT — COGNITIVE AND FUNCTIONAL STATUS - GENERAL
SUGGESTED CMS G CODE MODIFIER MOBILITY: CH
SUGGESTED CMS G CODE MODIFIER DAILY ACTIVITY: CH
DAILY ACTIVITIY SCORE: 24
MOBILITY SCORE: 24

## 2020-12-05 ASSESSMENT — FIBROSIS 4 INDEX: FIB4 SCORE: 1.23

## 2020-12-05 NOTE — SENIOR ADMIT NOTE
Senior admit note     This is 40 years old female with medical history notable for end-stage renal disease secondary to lupus on HD every other day, hypertension, avascular necrosis on the bilateral hip status post right hip replacement, fibromyalgia presenting to the emergency department after her AVF is not working.  She failed hemodialysis on Wednesday, last hemodialysis was 1 week ago.  She has been called by dialysis center to come into the emergency department.  She describes nausea, generalized pain, tremors, imbalance, chest congestion, heartburn, worsening leg swelling.  Patient has been urinating less than 1 cup/day.  She denies any vomiting, chest pain, palpitations, shortness of breath, cough, abdominal pain, dysuria, melena or hematochezia, diarrhea, fever or chills, rash, acute arthritis.  She has been vaping for years however this was not daily use.  She denies any alcohol or drug use.  In the emergency department patient was mildly hypertensive, saturating on room air, afebrile, heart rate within normal limits.  Labs are pertinent for BUN of 87, creatinine 20, potassium 7.3, anion gap 22, bicarb 18, hemoglobin 9.9, chest x-ray showed cardiomegaly.  Patient received calcium gluconate, insulin and dextrose in the emergency department.  Nephrology was consulted, Dr. Byers, emergently who recommended to get temporary catheter.  Dr. Pérez intensivist was consulted, patient underwent into temporary catheter placement.        Vitals:    12/04/20 2131   BP: 155/68   Pulse: 92   Resp: (!) 21   Temp:    SpO2: 90%        Positive physical exam, brief  -Flapping tremor, no crackles or rhonchi in the lungs, cardiac exam within normal limits, abdomen is soft.  -Pretibial edema 2+  -Slight tenderness to palpation on AV fistula site on the right.  -Port on the left side of chest.  Patient has been using this for IV access and also chemotherapy for in the past for lupus      Assessment  #Uremia  #Failure of the  hemodialysis arteriovenous fistula  #Missed dialysis  #High anion gap metabolic acidosis due to uremia  #Anemia of chronic disease secondary to end-stage renal disease, stable  #Hypertension  #Systemic lupus erythematosus, no flare  #Fibromyalgia  #Metabolic encephalopathy due to uremia  #Avascular necrosis of bilateral hip     Plan  -Admit patient to telemetry with cardiac monitoring for hyperkalemia  -No T wave changes on the EKG.  Patient received calcium gluconate, insulin and dextrose.  Patient will underwent dialysis tonight.  -Nephrology was consulted emergently in the emergency department for uremia.  Patient underwent emergent temporary catheter for dialysis.  She will receive emergent dialysis tonight  -Ultrasound of the AV fistula on the right.  Consider vascular surgery consultation in the morning.     DVT prophylaxis:  Heparin  CODE STATUS:  Full  Med-rec:  Complete     This patient care was provided during crisis level of care due to COVID-19 pandemic  Please note that this dictation was created using voice recognition software.  I have made every reasonable attempt to correct obvious errors, but it is possible there are errors of grammar or possibly content that I did not discover before finalizing the note.  Please see intern Dr. Ojeda's HPI for further information

## 2020-12-05 NOTE — PROGRESS NOTES
Daily Progress Note:     Date of Service: 12/5/2020  Primary Team: [facility]   Attending: Rajesh Moran M.D.   Senior Resident: Jeremiah Castellon D.O.  Intern: Dr. Duran  Contact:  [phone]    Chief Complaint:   Hyperkalemia  ESRD    ID:  Patient is a 39 yo F PMHx ESRD secondary to lupus nephritis here with hyperkalemia after dialysis access issue unable to draw for her dialysis since last Friday with concern for graft thrombosis. Discussed with vascular, may not be able to salvage. Patient received emergent HD early 12/5 with resolution of hyperkalemia    Subjective:   -Patient no longer symptomatic, mild leg pains today    -Left port-a-cath being drawn from but unable to distinguish if in proper position, may not be necessary to continue in long term    -Dispo: Discussed with vascular surgery, will need long term plan for dialysis continuation before discharge given extensive history    Consultants/Specialty:  Vascular Surgery- Dr. Poole  Nephrology- Dr. Barrientos    Review of Systems:    Review of Systems   Constitutional: Positive for malaise/fatigue. Negative for chills, fever and weight loss.   HENT: Negative for congestion, ear discharge, ear pain, sinus pain and sore throat.    Respiratory: Negative for cough, shortness of breath and wheezing.    Cardiovascular: Negative for chest pain, palpitations and leg swelling.   Gastrointestinal: Negative for abdominal pain, constipation, diarrhea, nausea and vomiting.   Genitourinary: Negative for dysuria, flank pain, frequency, hematuria and urgency.   Musculoskeletal: Negative for back pain, joint pain and neck pain.   Skin: Negative for itching and rash.   Neurological: Positive for weakness. Negative for dizziness, tingling, tremors and headaches.   Endo/Heme/Allergies: Does not bruise/bleed easily.   Psychiatric/Behavioral: Negative for depression and memory loss. The patient is not nervous/anxious.        Objective:   Physical Exam:   Vitals:   Temp:   [36.3 °C (97.3 °F)-37.3 °C (99.1 °F)] 37.3 °C (99.1 °F)  Pulse:  [] 72  Resp:  [14-60] 18  BP: (116-184)/() 152/82  SpO2:  [90 %-98 %] 96 %    Physical Exam  Constitutional:       Appearance: Normal appearance.   HENT:      Head: Normocephalic and atraumatic.      Right Ear: External ear normal.      Left Ear: External ear normal.      Nose: Nose normal.      Mouth/Throat:      Mouth: Mucous membranes are moist.   Eyes:      Extraocular Movements: Extraocular movements intact.      Pupils: Pupils are equal, round, and reactive to light.   Neck:      Musculoskeletal: Normal range of motion. No neck rigidity or muscular tenderness.   Cardiovascular:      Rate and Rhythm: Normal rate and regular rhythm.      Pulses: Normal pulses.      Heart sounds: Normal heart sounds.   Pulmonary:      Effort: Pulmonary effort is normal.      Breath sounds: Normal breath sounds.   Abdominal:      General: Abdomen is flat. Bowel sounds are normal. There is no distension.      Palpations: Abdomen is soft. There is no mass.      Tenderness: There is no abdominal tenderness.   Musculoskeletal: Normal range of motion.         General: No swelling.      Right lower leg: Edema present.      Left lower leg: Edema present.      Comments:   Tenderness to palpation at AV fistula site on right, port cath present on left side of chest   Skin:     General: Skin is warm and dry.      Capillary Refill: Capillary refill takes less than 2 seconds.   Neurological:      General: No focal deficit present.      Mental Status: She is alert and oriented to person, place, and time.   Psychiatric:         Mood and Affect: Mood normal.         Behavior: Behavior normal.           Labs:   Recent Labs     12/04/20  1655   WBC 5.2   RBC 3.07*   HEMOGLOBIN 9.9*   HEMATOCRIT 30.1*   MCV 98.0*   MCH 32.2   RDW 41.6   PLATELETCT 100*   MPV 10.6   NEUTSPOLYS 56.80   LYMPHOCYTES 26.60   MONOCYTES 12.20   EOSINOPHILS 1.90   BASOPHILS 1.70     Recent Labs      12/04/20  1655   SODIUM 133*   POTASSIUM 7.3*   CHLORIDE 93*   CO2 18*   GLUCOSE 78   BUN 87*     Recent Labs     12/04/20  1655   ALBUMIN 4.0   TBILIRUBIN 0.2   ALKPHOSPHAT 103*   TOTPROTEIN 6.9   ALTSGPT 7   ASTSGOT 8*   CREATININE 20.42*       Imaging:   DX-CHEST-LIMITED (1 VIEW)   Final Result         1.  Mild interstitial pulmonary parenchymal prominence, appearance suggests mild interstitial edema and/or infiltrates.   2.  Cardiomegaly      DX-CHEST-PORTABLE (1 VIEW)   Final Result      Again seen cardiomegaly.      US-HEMODIALYSIS GRAFT DUPLEX COMP UPPER EXTREMITY    (Results Pending)       Assessment and Plan:  * AV fistula occlusion (HCC)- (present on admission)  Assessment & Plan  - right brachial shunt was clotted off per patient, missed dialysis for 1 week  - Admit to telemetry with cardiac monitoring  - Emergent dialysis planned, central line temporary trialysis HD catheter placed in ER by Dr. Pérez intensivist  - US of AV fistula ordered    Plan:  -Discussed with vascular, may not be able to salvage, will consider other options    Hyperkalemia- (present on admission)  Assessment & Plan  -Potassium 7.3 on admission.  No peaked T waves seen on EKG in ER.  -Like secondary to missed dialysis and ESRD secondary to lupus  -IV insulin, dextrose, calcium gluconate, Veltassa ordered in ER by ED provider  -Dr. Pérez intensivist placed a trialysis HD catheter in the ER  -Emergent dialysis done    Plan:  -CMP daily until resolution, keep K <5.5 with HD  -If >7 in am then page for temporary measures to bring K down with EKG, Ca gluconate, D50 + 10U insulin    Port-A-Cath in place- (present on admission)  Assessment & Plan  - port a cath in place  - last GAYATHRI 10/2019 noted tip of the port abuts the tricuspid valve, was to f/u with vascular surgery for port removal as an outpatient  - f/u after discharge 10/2019 unclear, day team to consider follow up    ESRD (end stage renal disease) on dialysis (HCC)- (present  on admission)  Assessment & Plan  -On dialysis serafin KAUFFMAN has been on dialysis for 4 years  - home VELPHORO not on formulary, per pharmacy will reorder when patient's family can bring medication in tomorrow    Lupus (systemic lupus erythematosus) (HCC)- (present on admission)  Assessment & Plan  - Diagnosed since age 23, complications observed are ESRD, HTN, anemia, chronic joint, back, hip pain documented in clinic notes  - last echo 8/20/2019 LVEF 65%, trace tricuspid regurgitation, no evidence of endocarditis  - continue home medication - hydroxychloroquine      GERD (gastroesophageal reflux disease)- (present on admission)  Assessment & Plan  -Continue omeprazole    Seizure disorder (HCC)- (present on admission)  Assessment & Plan  -History of seizures since 2016 per neurology, last noted seizure found on records 10/2019, had missed meds.  -Per neurology records, last video EEG 1/02/19, noted:  1. Diffuse nonspecific cortical dysfunction - moderate  2. Focal cortical dysfunction / irritability over frontal (R>L)  - Continue home lacosamide    Anemia in chronic kidney disease, on chronic dialysis (HCC)- (present on admission)  Assessment & Plan  -Hemoglobin 9.9 on admission, chronically anemic  -Anemia likely secondary to chronic kidney disease and lupus  - CTM    Nausea & vomiting- (present on admission)  Assessment & Plan  History of documented nausea vomiting in the past, PCP has prescribed Phenergan  -Continue home phenergan for nausea vomiting prn, day team can reassess    Low back pain- (present on admission)  Assessment & Plan  - history of diffuse joint and body pain, low back, hips and legs, follows with physiatry  Status post right total hip arthroplasty 1/2016  -Secondary to lupus diagnosis  - Continue home oxycodone dose as documented in previous clinic note 9/2020    Hypertension- (present on admission)  Assessment & Plan  - BP at highest 184/79 in ER, clonidine given in ER  -Likely secondary to  lupus diagnosis  -Continue home amlodipine  -prn IV labetalol ordered, unable to order IV hydralazine due to national shortage    Fibromyalgia- (present on admission)  Assessment & Plan  -Follows with physiatry  -Continue home pregabalin and tizanidine

## 2020-12-05 NOTE — ASSESSMENT & PLAN NOTE
-dx age 23, frequent loss to f/u  -symptoms include chronic joint pain and hip pain  -TTE 8/2019-->LVEF 65%  -complicated by ESRD on MWF iHD  -continue hydroxychloroquine

## 2020-12-05 NOTE — ASSESSMENT & PLAN NOTE
-History of seizures since 2016 per neurology, last noted seizure found on records 10/2019, had missed meds.  -Per neurology records, last video EEG 1/02/19, noted:  1. Diffuse nonspecific cortical dysfunction - moderate  2. Focal cortical dysfunction / irritability over frontal (R>L)  - Continue home lacosamide

## 2020-12-05 NOTE — ASSESSMENT & PLAN NOTE
-Potassium 7.3 on admission.  No peaked T waves seen on EKG in ER.  -Like secondary to missed dialysis and ESRD secondary to SLE  -IV insulin, dextrose, calcium gluconate, patiromer ordered in ER by ED providr  -Dr. Pérez intensivist placed a trialysis HD catheter in the ER; pt s/p emergent HD on 12/5  -K recovered to 5.4 12/5  -no further need for daily BMPs

## 2020-12-05 NOTE — ASSESSMENT & PLAN NOTE
- port a cath in place  - last GAYATHRI 10/2019 noted tip of the port abuts the tricuspid valve, was to f/u with vascular surgery for port removal as an outpatient  - f/u after discharge 10/2019 unclear, day team to consider follow up

## 2020-12-05 NOTE — ASSESSMENT & PLAN NOTE
- history of diffuse joint and body pain, low back, hips and legs, follows with physiatry  -Status post right total hip arthroplasty 1/2016  -Secondary to SLE w/ joint manifestations  -Continue home oxycodone dose as documented in previous clinic note 9/2020  -continue pregabalin pt w/ dx of fibromyalgia

## 2020-12-05 NOTE — H&P
"History & Physical Note    Date of Admission: 12/4/2020  Admission Status: Observation-Outpatient  [facility] Team: [facility]  Attending: Corey Lopez MD   Senior Resident: Dr. Hampton  Intern: Dr. Ojeda  Contact Number: [phone]    Chief Complaint: sent to ER for blocked AV fistula, missed dialysis for 1 week    History of Present Illness (HPI):  Debby is a 40 y.o. female with PMH lupus (diagnosed age 20-21), hypertension, end-stage renal disease (dialysis MWF), fibromyalgia, s/p right hip replacement (1/2016), diffuse joint pain/low back pain/hip/leg pain, status epilepticus seizures (since 2016, last EEG 2/13/2019) who presented 12/4/2020 after being sent from the ER by the dialysis center due to a blocked AV fistula and to have it repaired.  She reports that her last dialysis treatment was a week ago, and has dialysis every other day normally.  She was diagnosed with lupus at age 20-21. Reports less than 1 cup/day of urine output.  She reports feeling \"shaky\", but denies fever, chills, shortness of breath, chest pain, nausea, vomiting, heartburn, palpitations, enema, diarrhea, constipation, dysuria, hematuria, leg pain, melena.    Of note, she came to the ER on 12/2/2020 stating that she was unable to be dialyzed because her right brachial shunt was clotted off, intervention was planned on next Tuesday for catheter placement.  Was instructed to return to the ER if symptoms worsen.    According to records, last time she had issue with vascular access issues with her AV fistula was 8/10/2019, emergent dialysis was not indicated at that time.  Right arm graft thrombectomy was done on 8/11/2019, dialysis was done afterwards, and the patient was discharged.  She also had presented 10/2/2019 with tremors, confusion and possible seizures and had missed her dialysis a few days before.  She had also been off her seizure medications for several days.  Medication was restarted, seizure symptoms resolved, dialysis was " performed for 3 days daily and the patient was discharged. GAYATHRI negative for endocarditis but showed venous catheter pushing through tricuspid valve, f/u afterwards is unclear.    Vitals in ER notable for BP at highest 184/79 in ER, respiratory rate 21, saturating greater than 90% on room air.  Potassium 7.3 in ER, BUN/creatinine 87/20.42, hemoglobin 9.9, MCV 90.1, WBC 5.2, platelet count 100, Co2 18, an gap 22.0.  EKG sinus rhythm rate 72, QTc 443, no peak T waves noted. CXR notable for cardiomegaly.  The ER consulted nephrology Dr. Byers which recommended emergent dialysis with a central line catheter placement.  Critical care Dr. Pérez saw the patient in the ER and placed a central line temporary trialysis HD catheter for emergent dialysis.  In the ER, insulin, dextrose, calcium gluconate was started for hyperkalemia, Catapres and Veltassa were also given.      Review of Systems:   Review of Systems   Constitutional: Positive for malaise/fatigue. Negative for chills, fever and weight loss.   HENT: Negative for congestion, ear discharge, ear pain, sinus pain and sore throat.    Respiratory: Negative for cough, shortness of breath and wheezing.    Cardiovascular: Negative for chest pain, palpitations and leg swelling.   Gastrointestinal: Negative for abdominal pain, constipation, diarrhea, nausea and vomiting.   Genitourinary: Positive for flank pain. Negative for dysuria, frequency, hematuria and urgency.   Musculoskeletal: Negative for back pain, joint pain and neck pain.   Skin: Negative for itching and rash.   Neurological: Positive for tremors and weakness. Negative for dizziness, tingling and headaches.   Endo/Heme/Allergies: Does not bruise/bleed easily.   Psychiatric/Behavioral: Negative for depression and memory loss. The patient is not nervous/anxious.          Past Medical History:   Past Medical History was reviewed with patient.   has a past medical history of Arthritis, Avascular necrosis of bones  of both hips ([facility]) (10/10/2016), Clostridium difficile colitis (5/3/2011), Dialysis patient, ESBL (extended spectrum beta-lactamase) producing bacteria infection (8/25/2014), Fibromyalgia, Hypertension, Lupus ([facility]), Pneumonia (), Psychiatric disorder, Pyelonephritis (11/21/2017), Renal failure, Sepsis due to pneumonia ([facility]) (6/7/2018), and Status epilepticus ([facility]) (12/13/2018). She also has no past medical history of Chronic airway obstruction, not elsewhere classified or Fall.    Past Surgical History: Past Surgical History was reviewed with patient.   has a past surgical history that includes gastroscopy-endo (4/10/2011); sclerotheraphy (4/10/2011); gastroscopy-endo (4/18/2011); colonoscopy with biopsy (4/20/2011); gastroscopy-endo (4/27/2011); other (5/2011); other abdominal surgery; av fistula creation (9/9/2014); cath placement (9/9/2014); av fistula creation (11/14/2014); av fistula creation (2/3/2015); hip arthroplasty total (Right, 1/18/2016); closed reduction (Right, 7/5/2016); av fistula creation (Right, 7/12/2016); thrombectomy (Right, 8/20/2016); thrombectomy (Right, 8/21/2016); angioplasty balloon (8/21/2016); tracheostomy; av fistula creation (Right, 12/9/2017); thrombectomy (12/9/2017); av fistula revision (Right, 8/11/2018); av fistula thrombolysis (Right, 8/11/2018); and tahir (N/A, 8/20/2019).    Medications: Medications have been reviewed with patient.  Prior to Admission Medications   Prescriptions Last Dose Informant Patient Reported? Taking?   Cholecalciferol (VITAMIN D3) 41681 UNIT Cap 12/4/2020 at 0800 Patient Yes No   Sig: Take 10,000 Units by mouth every day.   Naloxone (NARCAN) 4 MG/0.1ML Liquid NEVER USED at NEVER USED Patient No No   Sig: Spray 4 mg in nose as needed (For severe sleepiness or difficulty breathing from possible overdose. Call 911 after administration.).   Patient not taking: Reported on 12/4/2020   Oxycodone HCl 20 MG Tab 12/4/2020 at 0900 Patient No No   Sig: Take 1  Tab by mouth 4 times a day as needed (pain) for up to 28 days.   Oxycodone HCl 20 MG Tab DUPLICATE at DUPLICATE Patient No No   Sig: Take 1 Tab by mouth 4 times a day as needed (pain) for up to 28 days.   Patient not taking: Reported on 12/4/2020   VELPHORO 500 MG Chew Tab 12/3/2020 at pm Patient No No   Sig: TAKE 2 TABLETS BY MOUTH THREE TIMES DAILY WITH EACH MEALS   amLODIPine (NORVASC) 10 MG Tab 12/3/2020 at pm Patient No No   Sig: Take 1 Tab by mouth every day.   ascorbic acid (ASCORBIC ACID) 500 MG Tab 12/4/2020 at 0800 Patient Yes No   Sig: Take 500 mg by mouth every day.   docusate sodium (COLACE) 100 MG Cap 12/4/2020 at 0800 Patient Yes No   Sig: Take 100 mg by mouth every day.   ferrous sulfate 325 (65 FE) MG tablet 12/4/2020 at 0800 Patient Yes No   Sig: Take 325 mg by mouth every day.   fluticasone (FLONASE) 50 MCG/ACT nasal spray 12/4/2020 at 0800 Patient No No   Sig: SHAKE LIQUID AND USE 1 SPRAY IN EACH NOSTRIL EVERY DAY   hydroxychloroquine (PLAQUENIL) 200 MG Tab 12/4/2020 at 0800 Patient Yes No   Sig: Take 200 mg by mouth every morning.   lacosamide (VIMPAT) 100 MG Tab tablet 12/4/2020 at 0800 Patient Yes No   Sig: Take 100 mg by mouth 2 Times a Day.   omeprazole (PRILOSEC) 20 MG delayed-release capsule 12/4/2020 at 0800 Patient No No   Sig: Take 1 Cap by mouth every day.   pregabalin (LYRICA) 50 MG capsule 12/4/2020 at 0800 Patient No No   Sig: Take 1 Cap by mouth 2 times a day for 28 days.   pregabalin (LYRICA) 50 MG capsule DUPLICATE at DUPLICATE Patient No No   Sig: Take 1 Cap by mouth 2 times a day for 28 days.   Patient not taking: Reported on 12/4/2020   promethazine (PHENERGAN) 25 MG Tab 12/4/2020 at 0900 Patient No No   Sig: TAKE 1 TABLET BY MOUTH EVERY 8 HOURS AS NEEDED FOR NAUSEA OR VOMITING   tizanidine (ZANAFLEX) 4 MG Tab 12/3/2020 at pm Patient No No   Sig: TAKE 1 TABLET BY MOUTH EVERY 6 HOURS AS NEEDED FOR MUSCLE SPASMS   traZODone (DESYREL) 50 MG Tab 12/3/2020 at pm Patient No No    Sig: Take 1 Tab by mouth every bedtime.   valACYclovir (VALTREX) 500 MG Tab NOT TAKING at NOT TAKING Patient No No   Sig: Take 1 Tab by mouth 2 times a day as needed (cold sores).   Patient not taking: Reported on 12/4/2020      Facility-Administered Medications: None        Allergies: Allergies have been reviewed with patient.  Allergies   Allergen Reactions   • Lorazepam [Ativan] Anxiety     Patient becomes severely paranoid and agitated   • Morphine Itching     Tolerates Dilaudid   • Seasonal Runny Nose and Itching     Hay fever, sabiha brush       Family History:   family history includes Cancer in her father; Heart Disease in her mother.     Social History:   Tobacco: denies   Alcohol: Denies  Recreational drugs (illegal and prescription): Denies  Employment: not employed  Activity Level: low  Living situation:  Lives in apartment  Recent travel:  denies  Primary Care Provider: faizan Manzano M.D.  Other (stressors, spirituality, exposures):  No recent COVID-19 exposures  Physical Exam:   Vitals:  Temp:  [36.6 °C (97.9 °F)] 36.6 °C (97.9 °F)  Pulse:  [] 92  Resp:  [14-57] 21  BP: (145-184)/() 155/68  SpO2:  [90 %-99 %] 90 %    Physical Exam  Constitutional:       Appearance: Normal appearance.   HENT:      Head: Normocephalic and atraumatic.      Right Ear: External ear normal.      Left Ear: External ear normal.      Nose: Nose normal.      Mouth/Throat:      Mouth: Mucous membranes are moist.   Eyes:      Extraocular Movements: Extraocular movements intact.      Pupils: Pupils are equal, round, and reactive to light.   Neck:      Musculoskeletal: Normal range of motion. No neck rigidity or muscular tenderness.   Cardiovascular:      Rate and Rhythm: Normal rate and regular rhythm.      Pulses: Normal pulses.      Heart sounds: Normal heart sounds.   Pulmonary:      Effort: Pulmonary effort is normal.      Breath sounds: Normal breath sounds.   Abdominal:      General: Abdomen is flat.  Bowel sounds are normal. There is no distension.      Palpations: Abdomen is soft. There is no mass.      Tenderness: There is no abdominal tenderness.   Musculoskeletal: Normal range of motion.         General: No swelling.      Right lower leg: Edema present.      Left lower leg: Edema present.      Comments: Tremors noted in both arms bilaterally, pretibial edema 2+  Tenderness to palpation at AV fistula site on right, port cath present on left side of chest   Skin:     General: Skin is warm and dry.      Capillary Refill: Capillary refill takes less than 2 seconds.   Neurological:      General: No focal deficit present.      Mental Status: She is alert and oriented to person, place, and time.   Psychiatric:         Mood and Affect: Mood normal.         Behavior: Behavior normal.         Labs:   Recent Results (from the past 24 hour(s))   CBC WITH DIFFERENTIAL    Collection Time: 12/04/20  4:55 PM   Result Value Ref Range    WBC 5.2 4.8 - 10.8 K/uL    RBC 3.07 (L) 4.20 - 5.40 M/uL    Hemoglobin 9.9 (L) 12.0 - 16.0 g/dL    Hematocrit 30.1 (L) 37.0 - 47.0 %    MCV 98.0 (H) 81.4 - 97.8 fL    MCH 32.2 27.0 - 33.0 pg    MCHC 32.9 (L) 33.6 - 35.0 g/dL    RDW 41.6 35.9 - 50.0 fL    Platelet Count 100 (L) 164 - 446 K/uL    MPV 10.6 9.0 - 12.9 fL    Neutrophils-Polys 56.80 44.00 - 72.00 %    Lymphocytes 26.60 22.00 - 41.00 %    Monocytes 12.20 0.00 - 13.40 %    Eosinophils 1.90 0.00 - 6.90 %    Basophils 1.70 0.00 - 1.80 %    Immature Granulocytes 0.80 0.00 - 0.90 %    Nucleated RBC 0.40 /100 WBC    Neutrophils (Absolute) 2.97 2.00 - 7.15 K/uL    Lymphs (Absolute) 1.39 1.00 - 4.80 K/uL    Monos (Absolute) 0.64 0.00 - 0.85 K/uL    Eos (Absolute) 0.10 0.00 - 0.51 K/uL    Baso (Absolute) 0.09 0.00 - 0.12 K/uL    Immature Granulocytes (abs) 0.04 0.00 - 0.11 K/uL    NRBC (Absolute) 0.02 K/uL   CMP    Collection Time: 12/04/20  4:55 PM   Result Value Ref Range    Sodium 133 (L) 135 - 145 mmol/L    Potassium 7.3 (HH) 3.6 - 5.5  mmol/L    Chloride 93 (L) 96 - 112 mmol/L    Co2 18 (L) 20 - 33 mmol/L    Anion Gap 22.0 (H) 7.0 - 16.0    Glucose 78 65 - 99 mg/dL    Bun 87 (HH) 8 - 22 mg/dL    Creatinine 20.42 (HH) 0.50 - 1.40 mg/dL    Calcium 8.2 (L) 8.5 - 10.5 mg/dL    AST(SGOT) 8 (L) 12 - 45 U/L    ALT(SGPT) 7 2 - 50 U/L    Alkaline Phosphatase 103 (H) 30 - 99 U/L    Total Bilirubin 0.2 0.1 - 1.5 mg/dL    Albumin 4.0 3.2 - 4.9 g/dL    Total Protein 6.9 6.0 - 8.2 g/dL    Globulin 2.9 1.9 - 3.5 g/dL    A-G Ratio 1.4 g/dL   PROTHROMBIN TIME (INR)    Collection Time: 20  4:55 PM   Result Value Ref Range    PT 14.2 12.0 - 14.6 sec    INR 1.07 0.87 - 1.13   APTT    Collection Time: 20  4:55 PM   Result Value Ref Range    APTT 38.5 (H) 24.7 - 36.0 sec   proBrain Natriuretic Peptide, NT    Collection Time: 20  4:55 PM   Result Value Ref Range    NT-proBNP 44657 (H) 0 - 125 pg/mL   ESTIMATED GFR    Collection Time: 20  4:55 PM   Result Value Ref Range    GFR If  2 (A) >60 mL/min/1.73 m 2    GFR If Non African American 2 (A) >60 mL/min/1.73 m 2   COVID/SARS CoV-2 PCR    Collection Time: 20  5:30 PM    Specimen: Nasopharyngeal; Respirate   Result Value Ref Range    COVID Order Status Received    CoV-2, Flu A/B, And RSV by PCR    Collection Time: 20  5:30 PM   Result Value Ref Range    Influenza virus A RNA Negative Negative    Influenza virus B, PCR Negative Negative    RSV, PCR Negative Negative    SARS-CoV-2 by PCR NotDetected     SARS-CoV-2 Source NP Swab    EKG    Collection Time: 20  7:52 PM   Result Value Ref Range    Report       [facility] Emergency Dept.    Test Date:  2020  Pt Name:    TAYLOR WARD               Department:   MRN:        [id number]                      Room:       [facility]  Gender:     Female                       Technician: [id number]  :        1980                   Requested By:HAIR ALATORRE  Order #:    [id number]                     Reading MD:    Measurements  Intervals                                Axis  Rate:       72                           P:          35  ID:         [id number]                          QRS:        -20  QRSD:       120                          T:          65  QT:         404  QTc:        443    Interpretive Statements  SINUS RHYTHM  MULTIFORM VENTRICULAR PREMATURE COMPLEXES  FIRST DEGREE AV BLOCK  INCOMPLETE LEFT BUNDLE BRANCH BLOCK  Compared to ECG 08/14/2019 03:43:18  Ventricular premature complex(es) now present  First degree AV block now present  Left bundle-branch block now present  Sinus tachycardia no longer present  ST (T wave) deviati on no longer present  Poor R-wave progression no longer present     ACCU-CHEK GLUCOSE    Collection Time: 12/04/20  8:13 PM   Result Value Ref Range    Glucose - Accu-Ck 108 (H) 65 - 99 mg/dL         EKG: Per my read, sinus rhythm, rate 72, QTc 443, no peaked T waves observed    Imaging:   DX-CHEST-PORTABLE (1 VIEW)   Final Result      Again seen cardiomegaly.      DX-CHEST-LIMITED (1 VIEW)    (Results Pending)       Previous Data Review: reviewed    Problem Representation:  40 y.o. female with PMH lupus (diagnosed age [age]), hypertension, end-stage renal disease (dialysis MWF), fibromyalgia, s/p right hip replacement (1/2016), diffuse joint pain/low back pain/hip/leg pain, status epilepticus seizures (since 2016, last EEG 2/13/2019) who presented 12/4/2020 after being sent from the ER by the dialysis center due to a blocked AV fistula and to have it repaired.  Potassium 7.3 in ER, BUN/creatinine 87/20.42, hemoglobin 9.9, MCV 90.1, WBC 5.2, platelet count 100, Co2 18, an gap 22.0.  EKG sinus rhythm rate 72, QTc 443, no peak T waves noted. Nephrology recommended emergent dialysis, critical care placed a central line trialysis HD catheter for emergent dialysis. Admitted for management of missed dialysis due to occulted AV fistula.    * AV fistula occlusion (HCC)- (present on  admission)  Assessment & Plan  - right brachial shunt was clotted off per patient, missed dialysis for 1 week  - Admit to telemetry with cardiac monitoring  - Emergent dialysis planned, central line temporary trialysis HD catheter placed in ER by Dr. Pérez intensivist  - US of AV fistula ordered  - Consider consulting vascular in a.m. for new AV fistula placement    Hyperkalemia- (present on admission)  Assessment & Plan  -Potassium 7.3 on admission.  No peaked T waves seen on EKG in ER.  -Like secondary to missed dialysis and ESRD secondary to lupus  -IV insulin, dextrose, calcium gluconate, Veltassa ordered in ER by ED provider  -Dr. Pérez intensivist placed a trialysis HD catheter in the ER  -Emergent dialysis planned for overnight    High anion gap metabolic acidosis- (present on admission)  Assessment & Plan  - Co2 18, an gap 22.0.  - secondary to uremia and missed dialysis, emergent dialysis planned  - continue to monitor, should improve with dialysis    Port-A-Cath in place- (present on admission)  Assessment & Plan  - port a cath in place  - last GAYATHRI 10/2019 noted tip of the port abuts the tricuspid valve, was to f/u with vascular surgery for port removal as an outpatient  - f/u after discharge 10/2019 unclear, day team to consider follow up    ESRD (end stage renal disease) on dialysis (HCC)- (present on admission)  Assessment & Plan  -On dialysis MWF, reports has been on dialysis for 4 years  - home VELPHORO not on formulary, per pharmacy will reorder when patient's family can bring medication in tomorrow    Lupus (systemic lupus erythematosus) (HCC)- (present on admission)  Assessment & Plan  - Diagnosed since age 23, complications observed are ESRD, HTN, anemia, chronic joint, back, hip pain documented in clinic notes  - last echo 8/20/2019 LVEF 65%, trace tricuspid regurgitation, no evidence of endocarditis  - continue home medication - hydroxychloroquine      Chronic lupus nephritis (HCC)-  (present on admission)  Assessment & Plan  - undergoing dialysis MWF, but last dialysis 1 week ago due to occluded AV fistula  - emergent dialysis indicated  - CTM    GERD (gastroesophageal reflux disease)- (present on admission)  Assessment & Plan  -Continue omeprazole    Seizure disorder (HCC)- (present on admission)  Assessment & Plan  -History of seizures since 2016 per neurology, last noted seizure found on records 10/2019, had missed meds.  -Per neurology records, last video EEG 1/02/19, noted:  1. Diffuse nonspecific cortical dysfunction - moderate  2. Focal cortical dysfunction / irritability over frontal (R>L)  - Continue home lacosamide    Anemia in chronic kidney disease, on chronic dialysis (HCC)- (present on admission)  Assessment & Plan  -Hemoglobin 9.9 on admission, chronically anemic  -Anemia likely secondary to chronic kidney disease and lupus  - CTM    Nausea & vomiting- (present on admission)  Assessment & Plan  History of documented nausea vomiting in the past, PCP has prescribed Phenergan  -Continue home phenergan for nausea vomiting prn, day team can reassess    Low back pain- (present on admission)  Assessment & Plan  - history of diffuse joint and body pain, low back, hips and legs, follows with physiatry  Status post right total hip arthroplasty 1/2016  -Secondary to lupus diagnosis  - Continue home oxycodone dose as documented in previous clinic note 9/2020    Hypertension- (present on admission)  Assessment & Plan  - BP at highest 184/79 in ER, clonidine given in ER  -Likely secondary to lupus diagnosis  -Continue home amlodipine  -prn IV labetalol ordered, unable to order IV hydralazine due to national shortage    Fibromyalgia- (present on admission)  Assessment & Plan  -Follows with physiatry  -Continue home pregabalin and tizanidine     DVT prophylaxis: heparin  CODE STATUS: Full    [facility] is currently operating under crisis standards of care due to the COVID-19  pandemic.

## 2020-12-05 NOTE — CONSULTS
"[facility] -  CONSULTATION NOTE               Author: Corey Barrientos M.D. Date & Time: 12/5/2020  8:40 AM       REASON FOR CONSULTATION:   - Inpatient hemodialysis management.    CHIEF COMPLAINT:   -  \" Lost access \"    HISTORY OF PRESENT ILLNESS:    Patient is a 40 year old female withe a PMHx of lupus, HTN, ESRD on HD MWF, fibromyalgia, right hip replacement, status epilepticus seizures who presented 12/4/2020 for AVG malfunction and for repair.  Last dialysis was about a week ago, makes minimal urine.  Per records had clotted right brachial shunt.  Prior had right graft thrombectomy in 8/11/2019.  In the ER yesterday, had temporary dialysis catheter placed for emergent HD for hyperkalemia.      REVIEW OF SYSTEMS:    GEN: No F/C  CV: No CP; No palpitations  RESP: No Cough; No SOB  GI: No pain; No N/V  MSK: No pain  All other systems reviewed and are negative    PAST MEDICAL HISTORY:   Past Medical History:   Diagnosis Date   • Arthritis     all joints,r/t lupus   • Avascular necrosis of bones of both hips ([facility]) 10/10/2016   • Clostridium difficile colitis 5/3/2011   • Dialysis patient    • ESBL (extended spectrum beta-lactamase) producing bacteria infection 8/25/2014   • Fibromyalgia    • Hypertension    • Lupus ([facility])    • Pneumonia    • Psychiatric disorder     anxiety, depression   • Pyelonephritis 11/21/2017   • Renal failure    • Sepsis due to pneumonia ([facility]) 6/7/2018   • Status epilepticus ([facility]) 12/13/2018         PAST SURGICAL HISTORY:   Past Surgical History:   Procedure Laterality Date   • GAYATHRI N/A 8/20/2019    Procedure: ECHOCARDIOGRAM, TRANSESOPHAGEAL;  Surgeon: Marta Elaine M.D.;  Location: SURGERY [facility];  Service: Cardiac   • AV FISTULA REVISION Right 8/11/2018    Procedure: AV FISTULA REVISION- Graft and Thrombectomy;  Surgeon: Shabbir Ardon M.D.;  Location: SURGERY [facility];  Service: General   • AV FISTULA THROMBOLYSIS Right 8/11/2018    " Procedure: AV FISTULA THROMBOLYSIS;  Surgeon: Shabbir Ardon M.D.;  Location: SURGERY [facility];  Service: General   • AV FISTULA CREATION Right 12/9/2017    Procedure: AV FISTULA CREATION-ARM FOR GRAFT;  Surgeon: Lesly Jansen M.D.;  Location: SURGERY [facility];  Service: General   • THROMBECTOMY  12/9/2017    Procedure: THROMBECTOMY;  Surgeon: Lesly Jansen M.D.;  Location: SURGERY [facility];  Service: General   • THROMBECTOMY Right 8/21/2016    Procedure: THROMBECTOMY - right AV fistula graft with grams;  Surgeon: Shabbir Ardon M.D.;  Location: SURGERY [facility];  Service:    • ANGIOPLASTY BALLOON  8/21/2016    Procedure: ANGIOPLASTY BALLOON;  Surgeon: Shabbir Ardon M.D.;  Location: [facility];  Service:    • THROMBECTOMY Right 8/20/2016    Procedure: THROMBECTOMY AV GRAFT;  Surgeon: Shabbir Ardon M.D.;  Location: [facility];  Service:    • AV FISTULA CREATION Right 7/12/2016    Procedure: AV FISTULA CREATION WITH GRAFT BRACHIAL AXILLARY;  Surgeon: Shabbir Ardon M.D.;  Location: SURGERY [facility];  Service:    • CLOSED REDUCTION Right 7/5/2016    Procedure: CLOSED REDUCTION- Hip ;  Surgeon: Michael Holman M.D.;  Location: [facility];  Service:    • HIP ARTHROPLASTY TOTAL Right 1/18/2016    Procedure: HIP ARTHROPLASTY TOTAL;  Surgeon: Michael Holman M.D.;  Location: [facility];  Service:    • AV FISTULA CREATION  2/3/2015    Performed by Shabbir Ardon M.D. at [facility]   • AV FISTULA CREATION  11/14/2014    Performed by Shabbir Ardon M.D. at SURGERY [facility]   • AV FISTULA CREATION  9/9/2014    Performed by Shabbir Ardon M.D. at [facility]   • CATH PLACEMENT  9/9/2014    Performed by Shabbir Ardon M.D. at [facility]   • OTHER  5/2011    tracheostomy   • GASTROSCOPY-ENDO  4/27/2011    Performed by PALMIRA DOAN at ENDOSCOPY [facility] ORS    • COLONOSCOPY WITH BIOPSY  2011    Performed by ALCIRA CRUZ at ENDOSCOPY [facility] ORS   • GASTROSCOPY-ENDO  2011    Performed by ALCIRA CRUZ at ENDOSCOPY [facility] ORS   • GASTROSCOPY-ENDO  4/10/2011    Performed by SYED JURADO at ENDOSCOPY [facility] ORS   • SCLEROTHERAPHY  4/10/2011    Performed by SYED JURADO at ENDOSCOPY [facility] ORS   • OTHER ABDOMINAL SURGERY      kidney biopsy   • TRACHEOSTOMY           FAMILY HISTORY:   - Reviewed and non contributory to current illness    SOCIAL HISTORY:   Social History     Socioeconomic History   • Marital status: Single     Spouse name: Not on file   • Number of children: Not on file   • Years of education: Not on file   • Highest education level: Not on file   Occupational History   • Not on file   Social Needs   • Financial resource strain: Not on file   • Food insecurity     Worry: Not on file     Inability: Not on file   • Transportation needs     Medical: Not on file     Non-medical: Not on file   Tobacco Use   • Smoking status: Former Smoker     Packs/day: 0.50     Years: 18.00     Pack years: 9.00     Types: Cigarettes     Start date:      Quit date: 2011     Years since quittin.4   • Smokeless tobacco: Never Used   • Tobacco comment: started at 15   Substance and Sexual Activity   • Alcohol use: No     Alcohol/week: 0.0 oz     Frequency: Never     Binge frequency: Never   • Drug use: No     Types: Marijuana   • Sexual activity: Not Currently     Partners: Male   Lifestyle   • Physical activity     Days per week: Not on file     Minutes per session: Not on file   • Stress: Not on file   Relationships   • Social connections     Talks on phone: Not on file     Gets together: Not on file     Attends Congregation service: Not on file     Active member of club or organization: Not on file     Attends meetings of clubs or organizations: Not on file     Relationship status: Not on file   • Intimate partner  "violence     Fear of current or ex partner: Not on file     Emotionally abused: Not on file     Physically abused: Not on file     Forced sexual activity: Not on file   Other Topics Concern   •  Service No   • Blood Transfusions Yes   • Caffeine Concern No   • Occupational Exposure No   • Hobby Hazards No   • Sleep Concern Yes   • Stress Concern Yes   • Weight Concern Yes   • Special Diet Yes   • Back Care No   • Exercise Yes   • Bike Helmet No   • Seat Belt Yes   • Self-Exams Yes   Social History Narrative   • Not on file         HOME MEDICATIONS:   - Reviewed and documented in chart    LABORATORY STUDIES:   - Reviewed and documented in chart    ALLERGIES:  Lorazepam [ativan], Morphine, and Seasonal    PHYSICAL EXAM:  VS:  /55   Pulse 67   Temp 37 °C (98.6 °F) (Temporal)   Resp 16   Ht 1.651 m (5' 5\")   Wt 85.4 kg (188 lb 4.4 oz)   LMP 11/18/2020   SpO2 97%   BMI 31.33 kg/m²   GEN: WDWN NAD  HENT: NC/AT; atraumatic external nose  EYES: No icterus  CVS: RRR; No m/r  RESP: CTA B, non-labored  GI: SNTND; +BS  MSK: No C/C/E  SKIN: No rash  RUE AVG with no thrill    LABS:  Recent Results (from the past 24 hour(s))   CBC WITH DIFFERENTIAL    Collection Time: 12/04/20  4:55 PM   Result Value Ref Range    WBC 5.2 4.8 - 10.8 K/uL    RBC 3.07 (L) 4.20 - 5.40 M/uL    Hemoglobin 9.9 (L) 12.0 - 16.0 g/dL    Hematocrit 30.1 (L) 37.0 - 47.0 %    MCV 98.0 (H) 81.4 - 97.8 fL    MCH 32.2 27.0 - 33.0 pg    MCHC 32.9 (L) 33.6 - 35.0 g/dL    RDW 41.6 35.9 - 50.0 fL    Platelet Count 100 (L) 164 - 446 K/uL    MPV 10.6 9.0 - 12.9 fL    Neutrophils-Polys 56.80 44.00 - 72.00 %    Lymphocytes 26.60 22.00 - 41.00 %    Monocytes 12.20 0.00 - 13.40 %    Eosinophils 1.90 0.00 - 6.90 %    Basophils 1.70 0.00 - 1.80 %    Immature Granulocytes 0.80 0.00 - 0.90 %    Nucleated RBC 0.40 /100 WBC    Neutrophils (Absolute) 2.97 2.00 - 7.15 K/uL    Lymphs (Absolute) 1.39 1.00 - 4.80 K/uL    Monos (Absolute) 0.64 0.00 - 0.85 K/uL    " Eos (Absolute) 0.10 0.00 - 0.51 K/uL    Baso (Absolute) 0.09 0.00 - 0.12 K/uL    Immature Granulocytes (abs) 0.04 0.00 - 0.11 K/uL    NRBC (Absolute) 0.02 K/uL   CMP    Collection Time: 12/04/20  4:55 PM   Result Value Ref Range    Sodium 133 (L) 135 - 145 mmol/L    Potassium 7.3 (HH) 3.6 - 5.5 mmol/L    Chloride 93 (L) 96 - 112 mmol/L    Co2 18 (L) 20 - 33 mmol/L    Anion Gap 22.0 (H) 7.0 - 16.0    Glucose 78 65 - 99 mg/dL    Bun 87 (HH) 8 - 22 mg/dL    Creatinine 20.42 (HH) 0.50 - 1.40 mg/dL    Calcium 8.2 (L) 8.5 - 10.5 mg/dL    AST(SGOT) 8 (L) 12 - 45 U/L    ALT(SGPT) 7 2 - 50 U/L    Alkaline Phosphatase 103 (H) 30 - 99 U/L    Total Bilirubin 0.2 0.1 - 1.5 mg/dL    Albumin 4.0 3.2 - 4.9 g/dL    Total Protein 6.9 6.0 - 8.2 g/dL    Globulin 2.9 1.9 - 3.5 g/dL    A-G Ratio 1.4 g/dL   PROTHROMBIN TIME (INR)    Collection Time: 12/04/20  4:55 PM   Result Value Ref Range    PT 14.2 12.0 - 14.6 sec    INR 1.07 0.87 - 1.13   APTT    Collection Time: 12/04/20  4:55 PM   Result Value Ref Range    APTT 38.5 (H) 24.7 - 36.0 sec   proBrain Natriuretic Peptide, NT    Collection Time: 12/04/20  4:55 PM   Result Value Ref Range    NT-proBNP 16458 (H) 0 - 125 pg/mL   ESTIMATED GFR    Collection Time: 12/04/20  4:55 PM   Result Value Ref Range    GFR If  2 (A) >60 mL/min/1.73 m 2    GFR If Non African American 2 (A) >60 mL/min/1.73 m 2   COVID/SARS CoV-2 PCR    Collection Time: 12/04/20  5:30 PM    Specimen: Nasopharyngeal; Respirate   Result Value Ref Range    COVID Order Status Received    CoV-2, Flu A/B, And RSV by PCR    Collection Time: 12/04/20  5:30 PM   Result Value Ref Range    Influenza virus A RNA Negative Negative    Influenza virus B, PCR Negative Negative    RSV, PCR Negative Negative    SARS-CoV-2 by PCR NotDetected     SARS-CoV-2 Source NP Swab    EKG    Collection Time: 12/04/20  7:52 PM   Result Value Ref Range    Report       [facility] Emergency Dept.    Test Date:   2020  Pt Name:    TAYLOR WARD               Department: ER  MRN:        [id number]                      Room:       [facility]  Gender:     Female                       Technician: [id number]  :        [id number]                   Requested By:HAIR ALATORRE  Order #:    [id number]                    Reading MD:    Measurements  Intervals                                Axis  Rate:       72                           P:          35  VA:         236                          QRS:        -20  QRSD:       120                          T:          65  QT:         404  QTc:        443    Interpretive Statements  SINUS RHYTHM  MULTIFORM VENTRICULAR PREMATURE COMPLEXES  FIRST DEGREE AV BLOCK  INCOMPLETE LEFT BUNDLE BRANCH BLOCK  Compared to ECG 2019 03:43:18  Ventricular premature complex(es) now present  First degree AV block now present  Left bundle-branch block now present  Sinus tachycardia no longer present  ST (T wave) deviati on no longer present  Poor R-wave progression no longer present     ACCU-CHEK GLUCOSE    Collection Time: 20  8:13 PM   Result Value Ref Range    Glucose - Accu-Ck 108 (H) 65 - 99 mg/dL   ACCU-CHEK GLUCOSE    Collection Time: 20  9:59 PM   Result Value Ref Range    Glucose - Accu-Ck 48 (L) 65 - 99 mg/dL   ACCU-CHEK GLUCOSE    Collection Time: 20 10:19 PM   Result Value Ref Range    Glucose - Accu-Ck 138 (H) 65 - 99 mg/dL   HEPATITIS PANEL ACUTE(4 COMPONENTS)    Collection Time: 20 12:30 AM   Result Value Ref Range    Hepatitis B Surface Antigen Non-Reactive Non-Reactive    Hepatitis B Cors Ab,IgM Non-Reactive Non-Reactive    Hepatitis A Virus Ab, IgM Non-Reactive Non-Reactive    Hepatitis C Antibody Non-Reactive Non-Reactive   HEP B SURFACE AB    Collection Time: 20 12:30 AM   Result Value Ref Range    Hep B Surface Antibody Quant <3.50 0.00 - 10.00 mIU/mL       (click the triangle to expand results)    IMAGING:  DX-CHEST-LIMITED (1 VIEW)   Final Result         1.   Mild interstitial pulmonary parenchymal prominence, appearance suggests mild interstitial edema and/or infiltrates.   2.  Cardiomegaly      DX-CHEST-PORTABLE (1 VIEW)   Final Result      Again seen cardiomegaly.      US-HEMODIALYSIS GRAFT DUPLEX COMP UPPER EXTREMITY    (Results Pending)       IMPRESSION:  - ESRD    * Etiology likely 2/2 Lupus    * Will need US of AVG    * Agree with vascular consult   - AVF occlusion  - Hyperkalemia  - AGMA  - Systemic Lupus Erythematosus    * Continue home hydroxychloroquine  - HTN    * Goal BP < 140/90    * Stable  - Anemia of CKD    * Goal Hgb 10-11    * Stable  - CKD-MBD    * Managed at HD unit    PLAN:  - Follow up labstoday  - MWF iHD during stay  - Agree with vascular consult  - AVG ultrasound  - UF as tolerated  - No dietary protein restrictions  - Dose all meds per ESRD    Thank you for the consultation!

## 2020-12-05 NOTE — ASSESSMENT & PLAN NOTE
- undergoing dialysis MWF, but last dialysis 1 week ago due to occluded AV fistula  - emergent dialysis indicated  - CTM

## 2020-12-05 NOTE — ED PROVIDER NOTES
ED Provider Note     Scribed for Amanda Romero D.O. by Luisa Tate. 12/4/2020, 4:48 PM.     Primary care provider: Sushila Manzano M.D.  Means of arrival: Walk In         History obtained from: Patient  History limited by: None    CHIEF COMPLAINT  Chief Complaint   Patient presents with   • Vascular Access Problem       HPI  Debby Carrizales is a 40 y.o. female with a history of hypertension and renal failure who presents to the emergency Department for a vascular access problem. The patient states that her AV fistula hasn't been working since last Wednesday and says she was sent to the [facility] ED by her doctor to have her vascular access fixed. She says she was supposed to be dialyzed 2 days ago and had a procedure scheduled 3 days ago, but says these events were cancelled. Patient adds that her last session of dialysis was 1 week ago. Patient has associated generalized discomfort, but denies fever, chills, or shortness of breath. She also denies coming into contact with anyone who has tested positive for Covid-19 or taking any recent travels outside of the country. Patient doesn't drink alcohol, use drugs, or smoke. She is allergic to Morphine and takes Oxycodone. Her PCP is Dr. Manzano.    REVIEW OF SYSTEMS  Pertinent positives include vascular access problem and generalized discomofrt. Pertinent negatives include no fever, chills, or shortness of breath.   See HPI for further details. All other systems are negative.    PAST MEDICAL HISTORY  Past Medical History:   Diagnosis Date   • Arthritis     all joints,r/t lupus   • Avascular necrosis of bones of both hips (HCC) 10/10/2016   • Clostridium difficile colitis 5/3/2011   • Dialysis patient    • ESBL (extended spectrum beta-lactamase) producing bacteria infection 8/25/2014   • Fibromyalgia    • Hypertension    • Lupus (HCC)    • Pneumonia    • Psychiatric disorder     anxiety, depression   • Pyelonephritis 11/21/2017   • Renal failure    •  Sepsis due to pneumonia (HCC) 2018   • Status epilepticus (HCC) 2018       FAMILY HISTORY  Family History   Problem Relation Age of Onset   • Heart Disease Mother    • Cancer Father        SOCIAL HISTORY  Social History     Tobacco Use   • Smoking status: Former Smoker     Packs/day: 0.50     Years: 18.00     Pack years: 9.00     Types: Cigarettes     Start date:      Quit date: 2011     Years since quittin.4   • Smokeless tobacco: Never Used   • Tobacco comment: started at 15   Substance Use Topics   • Alcohol use: No     Alcohol/week: 0.0 oz     Frequency: Never     Binge frequency: Never   • Drug use: No     Types: Marijuana      Social History     Substance and Sexual Activity   Drug Use No   • Types: Marijuana       SURGICAL HISTORY  Past Surgical History:   Procedure Laterality Date   • GAYATHRI N/A 2019    Procedure: ECHOCARDIOGRAM, TRANSESOPHAGEAL;  Surgeon: Marta Elaine M.D.;  Location: [facility];  Service: Cardiac   • AV FISTULA REVISION Right 2018    Procedure: AV FISTULA REVISION- Graft and Thrombectomy;  Surgeon: Shabbir Ardon M.D.;  Location: [facility];  Service: General   • AV FISTULA THROMBOLYSIS Right 2018    Procedure: AV FISTULA THROMBOLYSIS;  Surgeon: Shabbir Ardon M.D.;  Location: [facility];  Service: General   • AV FISTULA CREATION Right 2017    Procedure: AV FISTULA CREATION-ARM FOR GRAFT;  Surgeon: Lsely Jansen M.D.;  Location: [facility];  Service: General   • THROMBECTOMY  2017    Procedure: THROMBECTOMY;  Surgeon: Lesly Jansen M.D.;  Location: [facility];  Service: General   • THROMBECTOMY Right 2016    Procedure: THROMBECTOMY - right AV fistula graft with grams;  Surgeon: Shabbir Ardon M.D.;  Location: [facility];  Service:    • ANGIOPLASTY BALLOON  2016    Procedure: ANGIOPLASTY BALLOON;  Surgeon: Shabbir Ardon M.D.;   Location: [facility];  Service:    • THROMBECTOMY Right 8/20/2016    Procedure: THROMBECTOMY AV GRAFT;  Surgeon: Shabbir Ardon M.D.;  Location: SUR[facility];  Service:    • AV FISTULA CREATION Right 7/12/2016    Procedure: AV FISTULA CREATION WITH GRAFT BRACHIAL AXILLARY;  Surgeon: Shabbir Ardon M.D.;  Location: SURGERY [facility];  Service:    • CLOSED REDUCTION Right 7/5/2016    Procedure: CLOSED REDUCTION- Hip ;  Surgeon: Michael Holman M.D.;  Location: SURGERY [facility];  Service:    • HIP ARTHROPLASTY TOTAL Right 1/18/2016    Procedure: HIP ARTHROPLASTY TOTAL;  Surgeon: Michael Holman M.D.;  Location: [facility];  Service:    • AV FISTULA CREATION  2/3/2015    Performed by Shabbir Ardon M.D. at [facility]   • AV FISTULA CREATION  11/14/2014    Performed by Shabbir Ardon M.D. at [facility]   • AV FISTULA CREATION  9/9/2014    Performed by Shabbir Ardon M.D. at [facility]   • CATH PLACEMENT  9/9/2014    Performed by Shabbir Ardon M.D. at [facility]   • OTHER  5/2011    tracheostomy   • GASTROSCOPY-ENDO  4/27/2011    Performed by PALMIRA DOAN at [facility]   • COLONOSCOPY WITH BIOPSY  4/20/2011    Performed by ALCIRA CRUZ at [facility]   • GASTROSCOPY-ENDO  4/18/2011    Performed by ALCIRA CRUZ at [facility]   • GASTROSCOPY-ENDO  4/10/2011    Performed by SYED JURADO at [facility]   • SCLEROTHERAPHY  4/10/2011    Performed by SYED JURADO at [facility]   • OTHER ABDOMINAL SURGERY      kidney biopsy   • TRACHEOSTOMY         CURRENT MEDICATIONS  No current facility-administered medications for this encounter.     Current Outpatient Medications:   •  Oxycodone HCl 20 MG Tab, Take 1 Tab by mouth 4 times a day as needed (pain) for up to 28 days., Disp: 108 Tab, Rfl: 0  •  [START ON  12/15/2020] Oxycodone HCl 20 MG Tab, Take 1 Tab by mouth 4 times a day as needed (pain) for up to 28 days., Disp: 108 Tab, Rfl: 0  •  pregabalin (LYRICA) 50 MG capsule, Take 1 Cap by mouth 2 times a day for 28 days., Disp: 56 Cap, Rfl: 0  •  [START ON 12/15/2020] pregabalin (LYRICA) 50 MG capsule, Take 1 Cap by mouth 2 times a day for 28 days., Disp: 56 Cap, Rfl: 0  •  tizanidine (ZANAFLEX) 4 MG Tab, TAKE 1 TABLET BY MOUTH EVERY 6 HOURS AS NEEDED FOR MUSCLE SPASMS, Disp: 180 Tab, Rfl: 1  •  promethazine (PHENERGAN) 25 MG Tab, TAKE 1 TABLET BY MOUTH EVERY 8 HOURS AS NEEDED FOR NAUSEA OR VOMITING, Disp: 60 Tab, Rfl: 1  •  traZODone (DESYREL) 50 MG Tab, Take 1 Tab by mouth every bedtime., Disp: 90 Tab, Rfl: 1  •  VELPHORO 500 MG Chew Tab, TAKE 2 TABLETS BY MOUTH THREE TIMES DAILY WITH EACH MEALS, Disp: 180 Tab, Rfl: 3  •  amLODIPine (NORVASC) 10 MG Tab, Take 1 Tab by mouth every day., Disp: 90 Tab, Rfl: 3  •  fluticasone (FLONASE) 50 MCG/ACT nasal spray, SHAKE LIQUID AND USE 1 SPRAY IN EACH NOSTRIL EVERY DAY, Disp: 48 g, Rfl: 3  •  Naloxone (NARCAN) 4 MG/0.1ML Liquid, Spray 4 mg in nose as needed (For severe sleepiness or difficulty breathing from possible overdose. Call 911 after administration.)., Disp: , Rfl:   •  lacosamide (VIMPAT) 100 MG Tab tablet, Vimpat 100 mg tablet, Disp: , Rfl:   •  valACYclovir (VALTREX) 500 MG Tab, Take 1 Tab by mouth 2 times a day as needed (cold sores)., Disp: 60 Tab, Rfl: 1  •  Cholecalciferol (VITAMIN D3) 31889 UNIT Cap, Take 10,000 Units by mouth every day., Disp: , Rfl:   •  lidocaine (LIDODERM) 5 % Patch, Apply 1 Patch to skin as directed as needed (For back pain). On for 12 hours off for 12 hours , Disp: , Rfl:   •  docusate sodium (COLACE) 100 MG Cap, Take 100 mg by mouth every day., Disp: , Rfl:   •  omeprazole (PRILOSEC) 20 MG delayed-release capsule, Take 1 Cap by mouth every day., Disp: 30 Cap, Rfl: 3  •  ferrous sulfate 325 (65 FE) MG tablet, Take 325 mg by mouth every day.,  "Disp: , Rfl:   •  ascorbic acid (ASCORBIC ACID) 500 MG Tab, Take 500 mg by mouth every day., Disp: , Rfl:   •  hydroxychloroquine (PLAQUENIL) 200 MG Tab, Take 200 mg by mouth every bedtime., Disp: , Rfl:     ALLERGIES  Allergies   Allergen Reactions   • Lorazepam [Ativan] Anxiety     Patient becomes severely paranoid and agitated   • Morphine Itching     Tolerates Dilaudid   • Seasonal Runny Nose and Itching     Hay fever, sabiha brush       PHYSICAL EXAM  VITAL SIGNS: /89   Pulse 73   Temp 36.6 °C (97.9 °F) (Temporal)   Resp 14   Ht 1.651 m (5' 5\")   Wt 86 kg (189 lb 9.5 oz)   LMP 11/18/2020   SpO2 98%   BMI 31.55 kg/m²     Constitutional: Patient is a chronically ill-appearing patient in moderate distress from her chronic pain.  HENT: Normocephalic, atraumatic. Oropharynx moist without erythema or exudates.  Eyes: PERRL, EOMI, Conjunctiva normal  Neck: Supple with no cervical adenopathy. Normal range of motion in flexion, extension and lateral rotation.  Cardiovascular: Normal heart rate and Regular rhythm. No murmur  Thorax & Lungs: Lungs diminished in bases, Clear breath sounds with good excursion. No respiratory distress, no rhonchi, wheezing or rales.   Abdomen: Bowel sounds normal in all four quadrants. Soft,nontender, no rebound , guarding, palpable masses.   Skin: Mediport in the left infraclavicular area, Warm, Dry, No erythema, No rashes.   Back: No cervical, thoracic, or lumbosacral tenderness.    Extremities: Peripheral pulses 4/4    Musculoskeletal: Bilateral lower extremities have 2+ non-pitting edema, Normal range of motion in all major joints. No tenderness to palpation or major deformities noted.   Neurologic: Alert & oriented x 3, Normal motor function, Normal sensory function, No lateralizing or focal deficits noted.  Psychiatric: Affect normal, Judgment normal, Mood normal.     DIAGNOSTICS/PROCEDURES    LABS  Results for orders placed or performed during the hospital encounter of " 12/04/20   CBC WITH DIFFERENTIAL   Result Value Ref Range    WBC 5.2 4.8 - 10.8 K/uL    RBC 3.07 (L) 4.20 - 5.40 M/uL    Hemoglobin 9.9 (L) 12.0 - 16.0 g/dL    Hematocrit 30.1 (L) 37.0 - 47.0 %    MCV 98.0 (H) 81.4 - 97.8 fL    MCH 32.2 27.0 - 33.0 pg    MCHC 32.9 (L) 33.6 - 35.0 g/dL    RDW 41.6 35.9 - 50.0 fL    Platelet Count 100 (L) 164 - 446 K/uL    MPV 10.6 9.0 - 12.9 fL    Neutrophils-Polys 56.80 44.00 - 72.00 %    Lymphocytes 26.60 22.00 - 41.00 %    Monocytes 12.20 0.00 - 13.40 %    Eosinophils 1.90 0.00 - 6.90 %    Basophils 1.70 0.00 - 1.80 %    Immature Granulocytes 0.80 0.00 - 0.90 %    Nucleated RBC 0.40 /100 WBC    Neutrophils (Absolute) 2.97 2.00 - 7.15 K/uL    Lymphs (Absolute) 1.39 1.00 - 4.80 K/uL    Monos (Absolute) 0.64 0.00 - 0.85 K/uL    Eos (Absolute) 0.10 0.00 - 0.51 K/uL    Baso (Absolute) 0.09 0.00 - 0.12 K/uL    Immature Granulocytes (abs) 0.04 0.00 - 0.11 K/uL    NRBC (Absolute) 0.02 K/uL   CMP   Result Value Ref Range    Sodium 133 (L) 135 - 145 mmol/L    Potassium 7.3 (HH) 3.6 - 5.5 mmol/L    Chloride 93 (L) 96 - 112 mmol/L    Co2 18 (L) 20 - 33 mmol/L    Anion Gap 22.0 (H) 7.0 - 16.0    Glucose 78 65 - 99 mg/dL    Bun 87 (HH) 8 - 22 mg/dL    Creatinine 20.42 (HH) 0.50 - 1.40 mg/dL    Calcium 8.2 (L) 8.5 - 10.5 mg/dL    AST(SGOT) 8 (L) 12 - 45 U/L    ALT(SGPT) 7 2 - 50 U/L    Alkaline Phosphatase 103 (H) 30 - 99 U/L    Total Bilirubin 0.2 0.1 - 1.5 mg/dL    Albumin 4.0 3.2 - 4.9 g/dL    Total Protein 6.9 6.0 - 8.2 g/dL    Globulin 2.9 1.9 - 3.5 g/dL    A-G Ratio 1.4 g/dL   PROTHROMBIN TIME (INR)   Result Value Ref Range    PT 14.2 12.0 - 14.6 sec    INR 1.07 0.87 - 1.13   APTT   Result Value Ref Range    APTT 38.5 (H) 24.7 - 36.0 sec   COVID/SARS CoV-2 PCR    Specimen: Nasopharyngeal; Respirate   Result Value Ref Range    COVID Order Status Received    proBrain Natriuretic Peptide, NT   Result Value Ref Range    NT-proBNP 58894 (H) 0 - 125 pg/mL   CoV-2, Flu A/B, And RSV by PCR    Result Value Ref Range    Influenza virus A RNA Negative Negative    Influenza virus B, PCR Negative Negative    RSV, PCR Negative Negative    SARS-CoV-2 by PCR NotDetected     SARS-CoV-2 Source NP Swab    ESTIMATED GFR   Result Value Ref Range    GFR If  2 (A) >60 mL/min/1.73 m 2    GFR If Non African American 2 (A) >60 mL/min/1.73 m 2   ACCU-CHEK GLUCOSE   Result Value Ref Range    Glucose - Accu-Ck 108 (H) 65 - 99 mg/dL   ACCU-CHEK GLUCOSE   Result Value Ref Range    Glucose - Accu-Ck 48 (L) 65 - 99 mg/dL   ACCU-CHEK GLUCOSE   Result Value Ref Range    Glucose - Accu-Ck 138 (H) 65 - 99 mg/dL   EKG   Result Value Ref Range    Report       [facility]    Test Date:  2020  Pt Name:    TAYLOR WARD               Department: ER  MRN:        [id number]                      Room:       [facility]  Gender:     Female                       Technician: [id number]  :        1980                   Requested By:HAIR ALATORRE  Order #:    [id number]                    Reading MD:    Measurements  Intervals                                Axis  Rate:       72                           P:          35  CA:         236                          QRS:        -20  QRSD:       120                          T:          65  QT:         404  QTc:        443    Interpretive Statements  SINUS RHYTHM  MULTIFORM VENTRICULAR PREMATURE COMPLEXES  FIRST DEGREE AV BLOCK  INCOMPLETE LEFT BUNDLE BRANCH BLOCK  Compared to ECG 2019 03:43:18  Ventricular premature complex(es) now present  First degree AV block now present  Left bundle-branch block now present  Sinus tachycardia no longer present  ST (T wave) deviati on no longer present  Poor R-wave progression no longer present          Labs reviewed by me    RADIOLOGY/PROCEDURES  DX-CHEST-PORTABLE (1 VIEW)   Final Result      Again seen cardiomegaly.        Results and radiologist interpretation reviewed by me.     COURSE & MEDICAL DECISION  MAKING  Pertinent Labs & Imaging studies reviewed. (See chart for details)    4:48 PM - Patient seen and evaluated at bedside. Discussed plan of care with patient. I informed them that labs and imaging will be ordered to evaluate symptoms. The patient is understanding and agreeable with plan of care. Ordered for DX-Chest-Portable (1 View), CBC w/ Differential, CMP, Prothrombin Time, APTT, COVID/SARS CoV-2 PCR, Estimated GFR, and ProBrain Natriuretic Peptide to evaluate.     5:30 PM Ordered CoV-2, Flu A/B, and RSV by PCR to evaluate the patient.   Patient's labs were grossly abnormal showing a potassium of 7.3, chloride 93, CO2 18, anion gap 22 her BUN is 87 with a creatinine of 20.42 calcium 8.2.  Liver enzymes are unremarkable.  White count is normal and she has a stable H&H.  Chest x-ray shows cardiomegaly only with no fulminant pulmonary edema.    7:27 PM Patient was reevaluated at bedside. Discussed lab and radiology results with the patient and informed them that there were acute and abnormal findings as noted above. The plan of care was discussed with th patient. She is understanding and agreeable to the plan of care. The patient had the opportunity to ask any questions. The plan for hospitalization was discussed with the patient due to their current condition. Patient is understanding and agreeable to the plan for hospitalization.     7:38 PM Paged Nephrology     7:42 PM I discussed the patient's case and the above findings with Dr. Byers (Nephrology) who recommends giving the patient medication to reduce her potassium and says they will arrange to have the patient dialyzed tonight. Dr. Byers reports she wants the patient to have a temporary dialysis catheter placed by  (Intensivist).     7:43 PM Paged Hospitalist    7:51 PM Patient will be treated with 0.2 mg Catapres, 8.4 g Veltassa, 50 mEqq of 8.4% Sodium Bicarbonate, Insulin Regular Injection, 50 mL of 50% Dextrose, and 2,000 mg Calcium  GLUConate.     7:52 PM Paged Pulmonary     7:54 PM I discussed the patient's case and the above findings with Dr. Hampton ([facility]) who agrees with the plan of care and will hospitalize the patient due to her current condition.    8:07 PM I discussed the patient's case and the above findings with Dr. Pérez (Pulmonary) who says she will come down to the emergency department and put a dialysis catheter in the patient.      PPE Note: I verified that the patient was wearing a mask and I was wearing appropriate PPE every time I entered the room. The patient's mask was on the patient at all times during my encounter except for a brief view of the oropharynx.     DISPOSITION:  Patient will be hospitalized by Dr. Hampton in guarded condition.     FINAL IMPRESSION  1. Problem with vascular access    2. Chronic kidney disease on chronic dialysis (HCC)    3. History of systemic lupus erythematosus (HCC)    4. Hyperkalemia         Luisa BARGER ([place]), am scribing for, and in the presence of, Amanda Romero D.O..    Electronically signed by: Luisa Tate (Scribe), 12/4/2020    Amanda BARGER D.O. personally performed the services described in this documentation, as scribed by Luisa Tate in my presence, and it is both accurate and complete. C    The note accurately reflects work and decisions made by me.  Amanda Romero D.O.  12/5/2020  1:33 AM

## 2020-12-05 NOTE — PROGRESS NOTES
Patient transported via gurney with ACLS RN on zoll monitor. Monitor room verified patient is visible on cardiac monitor. Fall precautions in place, patient oriented to room, bed in low and locked position, call light with in reach.

## 2020-12-05 NOTE — PROCEDURES
Central Line Insertion    Date/Time: 12/4/2020 9:20 PM  Performed by: Lacey Pérez M.D.  Authorized by: Lacey Pérez M.D.     Consent:     Consent obtained:  Verbal    Consent given by:  Patient    Risks discussed:  Arterial puncture, incorrect placement and bleeding    Alternatives discussed:  Delayed treatment  Pre-procedure details:     Hand hygiene: Hand hygiene performed prior to insertion      Sterile barrier technique: All elements of maximal sterile technique followed      Skin preparation:  ChloraPrep    Skin preparation agent: Skin preparation agent completely dried prior to procedure    Sedation:     Sedation type:  Anxiolysis  Anesthesia:     Anesthesia method:  Local infiltration    Local anesthetic:  Lidocaine 1% w/o epi  Procedure details:     Location:  R internal jugular    Patient position:  Flat    Procedural supplies:  Triple lumen (15cm Trialysis temporary HD catheter)    Catheter size:  14 Fr    Landmarks identified: yes      Ultrasound guidance: yes      Sterile ultrasound techniques: Sterile gel and sterile probe covers were used      Number of attempts:  1    Successful placement: yes    Post-procedure details:     Post-procedure:  Dressing applied    Assessment:  Blood return through all ports, free fluid flow and no pneumothorax on x-ray    Patient tolerance of procedure:  Tolerated well, no immediate complications  Comments:      This was placement of a temporary trialysis HD catheter

## 2020-12-05 NOTE — ASSESSMENT & PLAN NOTE
Life threateningly high  S/p calcium, insulin/d50  Emergent temp HD catheter placed  Emergent dialysis

## 2020-12-05 NOTE — ASSESSMENT & PLAN NOTE
- Co2 18, an gap 22.0.  - secondary to uremia and missed dialysis, emergent dialysis planned  - continue to monitor, should improve with dialysis

## 2020-12-05 NOTE — PROGRESS NOTES
[facility] Services Progress Note    HD orders received, contact on-call Dialysis RN when CVC placed and patient ready for -[phone].

## 2020-12-05 NOTE — ASSESSMENT & PLAN NOTE
History of documented nausea vomiting in the past, PCP has prescribed Phenergan  -Continue home phenergan for nausea vomiting prn, day team can reassess

## 2020-12-05 NOTE — PROGRESS NOTES
[facility] Services Progress Note    Hemodialysis treatment ordered today per Dr. Byers x 3.5 hours.     Patient tolerated tx; BP elevated at times despite fluid removal.   See paper flow sheet for details.     Net UF 2,000 mL.     Post tx, CVC flushed with saline then locked with heparin 1000 units/mL per designated amount in each wing then clamped and capped. Aspirate heparin prior to next CVC use.    Report given to Primary RN.

## 2020-12-05 NOTE — ASSESSMENT & PLAN NOTE
-Hemoglobin 9.9 on admission, chronically anemic  -Anemia likely secondary to chronic kidney disease and lupus  - CTM

## 2020-12-05 NOTE — ED NOTES
from Lab called with critical result of cr: 20.42,bun: 87 K+ 7.3 at 1804. Critical lab result read back to  .   Dr. Romero notified of critical lab result at 1804.  Critical lab result read back by Dr. Romero.

## 2020-12-05 NOTE — DISCHARGE PLANNING
Outpatient Dialysis Note    Confirmed patient is active at:    [facility]  [address]  [place] [address]    Schedule: Monday, Wednesday, Friday   Time: 3:30pm    Patient is seen by Dr. Najjar in HD clinic.    Spoke with Lashawn at facility who confirmed.    Forwarded records for review.    Elicia Ortiz- Dialysis Coordinator  [facility] [employer] # [phone]

## 2020-12-05 NOTE — PROGRESS NOTES
Received report at bedside from day shift RN. Reviewed POC, no questions at this time. Patient is AOx4. Call light is within reach, bed alarm is on and in lowest/locked position.

## 2020-12-05 NOTE — ED NOTES
Medicate Pt per MAR for sedation and placement of dialysis port. Placed on 2 L oxygen via nasal canula. Monitors in place.

## 2020-12-05 NOTE — ASSESSMENT & PLAN NOTE
- right brachial shunt was clotted off per patient, missed dialysis for 1 week  - Continue telemetry monitoring; so far NSR  - RUE US demonstrating total occlusion of AV graft  -port-a-cath noted in 2019 to abut tricuspid valve  -attempted thrombectomy failed 12/7  -d/t intraop complication, distal portion of port will need removed per IR-->subsequent permacath placement  -f/u nephro plans for permanent dialysis access solution; typically permacaths are used for 6mo-1yr as bridging to more long-term access  -no sticks or BP measurements on right extremity

## 2020-12-05 NOTE — CONSULTS
Critical Care Consultation    Date of consult: 12/4/2020    Referring Physician  Amanda Romero D.O.    Reason for Consultation  Critical care management of hyperkalemia and ESRD    History of Presenting Illness  Ms. Carrizales is a pleasant 40 year old lady with the past medical history of ESRD due to Lupus, hypertension, and avascular necrosis who was sent to the [facility] to have her AV fistula repaired.  The patient reports that her vascular access has been problematic and she has been unable to undergo hemodialysis.  Her last dialysis treatment was over a week ago.  She reports that she is feeling shaky, but otherwise denies palpitations, cough, shortness of breath, nausea, or vomiting.  She was found to have a potassium of 7.3.    The critical care team was consulted to assist with emergent treatment of hyperkalemia.    Code Status  Full Code    Review of Systems  Review of Systems   Constitutional: Positive for chills and malaise/fatigue. Negative for fever.   HENT: Negative for congestion and sore throat.    Eyes: Negative for blurred vision and double vision.   Respiratory: Negative for cough and shortness of breath.    Cardiovascular: Negative for chest pain.   Gastrointestinal: Negative for abdominal pain, diarrhea, nausea and vomiting.   Genitourinary: Negative for frequency and urgency.   Musculoskeletal: Negative for back pain and neck pain.   Skin: Negative for rash.   Neurological: Negative for dizziness, speech change, focal weakness and weakness.   Endo/Heme/Allergies: Does not bruise/bleed easily.   Psychiatric/Behavioral: Negative for depression. The patient is not nervous/anxious.        Past Medical History   has a past medical history of Arthritis, Avascular necrosis of bones of both hips ([facility]) (10/10/2016), Clostridium difficile colitis (5/3/2011), Dialysis patient, ESBL (extended spectrum beta-lactamase) producing bacteria infection (8/25/2014), Fibromyalgia, Hypertension, Lupus ([facility]),  Pneumonia (), Psychiatric disorder, Pyelonephritis (11/21/2017), Renal failure, Sepsis due to pneumonia (HCC) (6/7/2018), and Status epilepticus (HCC) (12/13/2018). She also has no past medical history of Chronic airway obstruction, not elsewhere classified or Fall.    Surgical History   has a past surgical history that includes gastroscopy-endo (4/10/2011); sclerotheraphy (4/10/2011); gastroscopy-endo (4/18/2011); colonoscopy with biopsy (4/20/2011); gastroscopy-endo (4/27/2011); other (5/2011); other abdominal surgery; av fistula creation (9/9/2014); cath placement (9/9/2014); av fistula creation (11/14/2014); av fistula creation (2/3/2015); hip arthroplasty total (Right, 1/18/2016); closed reduction (Right, 7/5/2016); av fistula creation (Right, 7/12/2016); thrombectomy (Right, 8/20/2016); thrombectomy (Right, 8/21/2016); angioplasty balloon (8/21/2016); tracheostomy; av fistula creation (Right, 12/9/2017); thrombectomy (12/9/2017); av fistula revision (Right, 8/11/2018); av fistula thrombolysis (Right, 8/11/2018); and tahir (N/A, 8/20/2019).    Family History  family history includes Cancer in her father; Heart Disease in her mother.    Social History   reports that she quit smoking about 9 years ago. Her smoking use included cigarettes. She started smoking about 25 years ago. She has a 9.00 pack-year smoking history. She has never used smokeless tobacco. She reports that she does not drink alcohol or use drugs.    Medications  Home Medications     Reviewed by Kasia Gaytan (Pharmacy Intern) on 12/04/20 at 1957  Med List Status: Complete   Medication Last Dose Status   amLODIPine (NORVASC) 10 MG Tab 12/3/2020 Active   ascorbic acid (ASCORBIC ACID) 500 MG Tab 12/4/2020 Active   Cholecalciferol (VITAMIN D3) 98014 UNIT Cap 12/4/2020 Active   docusate sodium (COLACE) 100 MG Cap 12/4/2020 Active   ferrous sulfate 325 (65 FE) MG tablet 12/4/2020 Active   fluticasone (FLONASE) 50 MCG/ACT nasal spray  12/4/2020 Active   hydroxychloroquine (PLAQUENIL) 200 MG Tab 12/4/2020 Active   lacosamide (VIMPAT) 100 MG Tab tablet 12/4/2020 Active   Naloxone (NARCAN) 4 MG/0.1ML Liquid NEVER USED Active   omeprazole (PRILOSEC) 20 MG delayed-release capsule 12/4/2020 Active   Oxycodone HCl 20 MG Tab 12/4/2020 Active   Oxycodone HCl 20 MG Tab DUPLICATE Active   pregabalin (LYRICA) 50 MG capsule 12/4/2020 Active   pregabalin (LYRICA) 50 MG capsule DUPLICATE Active   promethazine (PHENERGAN) 25 MG Tab 12/4/2020 Active   tizanidine (ZANAFLEX) 4 MG Tab 12/3/2020 Active   traZODone (DESYREL) 50 MG Tab 12/3/2020 Active   valACYclovir (VALTREX) 500 MG Tab NOT TAKING Active   VELPHORO 500 MG Chew Tab 12/3/2020 Active              Current Facility-Administered Medications   Medication Dose Route Frequency Provider Last Rate Last Admin   • calcium GLUConate 2,000 mg in  mL IVPB  2,000 mg Intravenous Once Amanda Romero D.O.       • patiromer (VELTASSA) powder for oral suspension 8.4 g  8.4 g Oral Once Amanda Romero D.O.       • senna-docusate (PERICOLACE or SENOKOT S) 8.6-50 MG per tablet 2 Tab  2 Tab Oral BID Neftaly Ojeda M.D.        And   • polyethylene glycol/lytes (MIRALAX) PACKET 1 Packet  1 Packet Oral QDAY PRN Neftaly Ojeda M.D.        And   • bisacodyl (DULCOLAX) suppository 10 mg  10 mg Rectal QDAY PRN Neftaly Ojeda M.D.       • acetaminophen (Tylenol) tablet 650 mg  650 mg Oral Q6HRS PRN Neftaly Ojeda M.D.       • promethazine (PHENERGAN) tablet 25 mg  25 mg Oral Q8HRS PRN Neftaly Ojeda M.D.       • tizanidine (ZANAFLEX) tablet 4 mg  4 mg Oral Q6HRS PRN Neftaly Ojeda M.D.       • traZODone (DESYREL) tablet 50 mg  50 mg Oral QHS Neftaly Ojeda M.D.       • [START ON 12/5/2020] Sucroferric Oxyhydroxide CHEW 500 mg  500 mg Oral TID Neftaly Ojeda M.D.       • pregabalin (LYRICA) capsule 50 mg  50 mg Oral BID Neftaly Ojeda M.D.       • oxyCODONE immediate-release (ROXICODONE) tablet 20 mg  20 mg Oral 4X/DAY PRN Neftaly Ojeda M.D.       •  [START ON 12/5/2020] omeprazole (PRILOSEC) capsule 20 mg  20 mg Oral DAILY Neftaly Ojeda M.D.       • lacosamide (VIMPAT) tablet 100 mg  100 mg Oral BID Neftaly Ojeda M.D.       • [START ON 12/5/2020] hydroxychloroquine (Plaquenil) tablet 200 mg  200 mg Oral QAM Neftaly Ojeda M.D.       • [START ON 12/5/2020] fluticasone (FLONASE) nasal spray 50 mcg  1 Spray Nasal DAILY Neftaly Ojeda M.D.       • [START ON 12/5/2020] ferrous sulfate tablet 325 mg  325 mg Oral DAILY Neftaly Ojeda M.D.       • [START ON 12/5/2020] docusate sodium (COLACE) capsule 100 mg  100 mg Oral DAILY Neftaly Ojeda M.D.       • [START ON 12/5/2020] vitamin D (cholecalciferol) tablet 1,000 Units  1,000 Units Oral DAILY Neftaly Ojeda M.D.       • [START ON 12/5/2020] ascorbic acid tablet 500 mg  500 mg Oral DAILY Neftaly Ojeda M.D.       • amLODIPine (NORVASC) tablet 10 mg  10 mg Oral DAILY Neftaly Ojeda M.D.         Current Outpatient Medications   Medication Sig Dispense Refill   • Oxycodone HCl 20 MG Tab Take 1 Tab by mouth 4 times a day as needed (pain) for up to 28 days. 108 Tab 0   • [START ON 12/15/2020] Oxycodone HCl 20 MG Tab Take 1 Tab by mouth 4 times a day as needed (pain) for up to 28 days. (Patient not taking: Reported on 12/4/2020) 108 Tab 0   • pregabalin (LYRICA) 50 MG capsule Take 1 Cap by mouth 2 times a day for 28 days. 56 Cap 0   • [START ON 12/15/2020] pregabalin (LYRICA) 50 MG capsule Take 1 Cap by mouth 2 times a day for 28 days. (Patient not taking: Reported on 12/4/2020) 56 Cap 0   • tizanidine (ZANAFLEX) 4 MG Tab TAKE 1 TABLET BY MOUTH EVERY 6 HOURS AS NEEDED FOR MUSCLE SPASMS 180 Tab 1   • promethazine (PHENERGAN) 25 MG Tab TAKE 1 TABLET BY MOUTH EVERY 8 HOURS AS NEEDED FOR NAUSEA OR VOMITING 60 Tab 1   • traZODone (DESYREL) 50 MG Tab Take 1 Tab by mouth every bedtime. 90 Tab 1   • VELPHORO 500 MG Chew Tab TAKE 2 TABLETS BY MOUTH THREE TIMES DAILY WITH EACH MEALS 180 Tab 3   • amLODIPine (NORVASC) 10 MG Tab Take 1 Tab by mouth  every day. 90 Tab 3   • fluticasone (FLONASE) 50 MCG/ACT nasal spray SHAKE LIQUID AND USE 1 SPRAY IN EACH NOSTRIL EVERY DAY 48 g 3   • Naloxone (NARCAN) 4 MG/0.1ML Liquid Spray 4 mg in nose as needed (For severe sleepiness or difficulty breathing from possible overdose. Call 911 after administration.). (Patient not taking: Reported on 12/4/2020)     • lacosamide (VIMPAT) 100 MG Tab tablet Take 100 mg by mouth 2 Times a Day.     • valACYclovir (VALTREX) 500 MG Tab Take 1 Tab by mouth 2 times a day as needed (cold sores). (Patient not taking: Reported on 12/4/2020) 60 Tab 1   • Cholecalciferol (VITAMIN D3) 69949 UNIT Cap Take 10,000 Units by mouth every day.     • docusate sodium (COLACE) 100 MG Cap Take 100 mg by mouth every day.     • omeprazole (PRILOSEC) 20 MG delayed-release capsule Take 1 Cap by mouth every day. 30 Cap 3   • ferrous sulfate 325 (65 FE) MG tablet Take 325 mg by mouth every day.     • ascorbic acid (ASCORBIC ACID) 500 MG Tab Take 500 mg by mouth every day.     • hydroxychloroquine (PLAQUENIL) 200 MG Tab Take 200 mg by mouth every morning.         Allergies  Allergies   Allergen Reactions   • Lorazepam [Ativan] Anxiety     Patient becomes severely paranoid and agitated   • Morphine Itching     Tolerates Dilaudid   • Seasonal Runny Nose and Itching     Hay fever, sabiha brush       Vital Signs last 24 hours  Temp:  [36.6 °C (97.9 °F)] 36.6 °C (97.9 °F)  Pulse:  [] 82  Resp:  [14-57] 20  BP: (145-184)/() 145/81  SpO2:  [90 %-99 %] 98 %    Physical Exam  Physical Exam  Vitals signs and nursing note reviewed.   Constitutional:       General: She is not in acute distress.     Appearance: She is obese. She is not toxic-appearing.      Comments: Pt appears older than stated age and chronically ill   HENT:      Head: Normocephalic and atraumatic.      Right Ear: External ear normal.      Left Ear: External ear normal.      Nose: Nose normal. No rhinorrhea.      Mouth/Throat:      Mouth: Mucous  membranes are dry.      Pharynx: Oropharynx is clear. No oropharyngeal exudate.   Eyes:      General: No scleral icterus.     Extraocular Movements: Extraocular movements intact.      Conjunctiva/sclera: Conjunctivae normal.      Pupils: Pupils are equal, round, and reactive to light.   Neck:      Musculoskeletal: Normal range of motion and neck supple.   Cardiovascular:      Rate and Rhythm: Normal rate and regular rhythm.      Pulses: Normal pulses.      Heart sounds: Normal heart sounds. No murmur.   Pulmonary:      Effort: Pulmonary effort is normal. No respiratory distress.      Breath sounds: Normal breath sounds. No wheezing.   Chest:      Chest wall: No tenderness.   Abdominal:      General: Bowel sounds are normal. There is no distension.      Palpations: Abdomen is soft.      Tenderness: There is no abdominal tenderness. There is no rebound.   Musculoskeletal: Normal range of motion.      Right lower leg: No edema.      Left lower leg: No edema.   Lymphadenopathy:      Cervical: No cervical adenopathy.   Skin:     General: Skin is warm and dry.      Capillary Refill: Capillary refill takes less than 2 seconds.      Findings: No rash.   Neurological:      Mental Status: She is alert and oriented to person, place, and time.      Cranial Nerves: No cranial nerve deficit.      Sensory: No sensory deficit.      Motor: No weakness.   Psychiatric:         Mood and Affect: Mood normal.         Behavior: Behavior normal.         Thought Content: Thought content normal.         Fluids  No intake or output data in the 24 hours ending 12/04/20 2123    Laboratory  Recent Results (from the past 48 hour(s))   CBC WITH DIFFERENTIAL    Collection Time: 12/04/20  4:55 PM   Result Value Ref Range    WBC 5.2 4.8 - 10.8 K/uL    RBC 3.07 (L) 4.20 - 5.40 M/uL    Hemoglobin 9.9 (L) 12.0 - 16.0 g/dL    Hematocrit 30.1 (L) 37.0 - 47.0 %    MCV 98.0 (H) 81.4 - 97.8 fL    MCH 32.2 27.0 - 33.0 pg    MCHC 32.9 (L) 33.6 - 35.0 g/dL     RDW 41.6 35.9 - 50.0 fL    Platelet Count 100 (L) 164 - 446 K/uL    MPV 10.6 9.0 - 12.9 fL    Neutrophils-Polys 56.80 44.00 - 72.00 %    Lymphocytes 26.60 22.00 - 41.00 %    Monocytes 12.20 0.00 - 13.40 %    Eosinophils 1.90 0.00 - 6.90 %    Basophils 1.70 0.00 - 1.80 %    Immature Granulocytes 0.80 0.00 - 0.90 %    Nucleated RBC 0.40 /100 WBC    Neutrophils (Absolute) 2.97 2.00 - 7.15 K/uL    Lymphs (Absolute) 1.39 1.00 - 4.80 K/uL    Monos (Absolute) 0.64 0.00 - 0.85 K/uL    Eos (Absolute) 0.10 0.00 - 0.51 K/uL    Baso (Absolute) 0.09 0.00 - 0.12 K/uL    Immature Granulocytes (abs) 0.04 0.00 - 0.11 K/uL    NRBC (Absolute) 0.02 K/uL   CMP    Collection Time: 12/04/20  4:55 PM   Result Value Ref Range    Sodium 133 (L) 135 - 145 mmol/L    Potassium 7.3 (HH) 3.6 - 5.5 mmol/L    Chloride 93 (L) 96 - 112 mmol/L    Co2 18 (L) 20 - 33 mmol/L    Anion Gap 22.0 (H) 7.0 - 16.0    Glucose 78 65 - 99 mg/dL    Bun 87 (HH) 8 - 22 mg/dL    Creatinine 20.42 (HH) 0.50 - 1.40 mg/dL    Calcium 8.2 (L) 8.5 - 10.5 mg/dL    AST(SGOT) 8 (L) 12 - 45 U/L    ALT(SGPT) 7 2 - 50 U/L    Alkaline Phosphatase 103 (H) 30 - 99 U/L    Total Bilirubin 0.2 0.1 - 1.5 mg/dL    Albumin 4.0 3.2 - 4.9 g/dL    Total Protein 6.9 6.0 - 8.2 g/dL    Globulin 2.9 1.9 - 3.5 g/dL    A-G Ratio 1.4 g/dL   PROTHROMBIN TIME (INR)    Collection Time: 12/04/20  4:55 PM   Result Value Ref Range    PT 14.2 12.0 - 14.6 sec    INR 1.07 0.87 - 1.13   APTT    Collection Time: 12/04/20  4:55 PM   Result Value Ref Range    APTT 38.5 (H) 24.7 - 36.0 sec   proBrain Natriuretic Peptide, NT    Collection Time: 12/04/20  4:55 PM   Result Value Ref Range    NT-proBNP 93741 (H) 0 - 125 pg/mL   ESTIMATED GFR    Collection Time: 12/04/20  4:55 PM   Result Value Ref Range    GFR If  2 (A) >60 mL/min/1.73 m 2    GFR If Non African American 2 (A) >60 mL/min/1.73 m 2   COVID/SARS CoV-2 PCR    Collection Time: 12/04/20  5:30 PM    Specimen: Nasopharyngeal; Respirate    Result Value Ref Range    COVID Order Status Received    CoV-2, Flu A/B, And RSV by PCR    Collection Time: 20  5:30 PM   Result Value Ref Range    Influenza virus A RNA Negative Negative    Influenza virus B, PCR Negative Negative    RSV, PCR Negative Negative    SARS-CoV-2 by PCR NotDetected     SARS-CoV-2 Source NP Swab    EKG    Collection Time: 20  7:52 PM   Result Value Ref Range    Report       [facility] Emergency Dept.    Test Date:  2020  Pt Name:    TAYLOR WARD               Department: ER  MRN:        [id number]                      Room:       [facility]  Gender:     Female                       Technician: [id number]  :        1980                   Requested By:HAIR ALATORRE  Order #:    [id number]                    Reading MD:    Measurements  Intervals                                Axis  Rate:       72                           P:          35  DE:         236                          QRS:        -20  QRSD:       120                          T:          65  QT:         404  QTc:        443    Interpretive Statements  SINUS RHYTHM  MULTIFORM VENTRICULAR PREMATURE COMPLEXES  FIRST DEGREE AV BLOCK  INCOMPLETE LEFT BUNDLE BRANCH BLOCK  Compared to ECG 2019 03:43:18  Ventricular premature complex(es) now present  First degree AV block now present  Left bundle-branch block now present  Sinus tachycardia no longer present  ST (T wave) deviati on no longer present  Poor R-wave progression no longer present     ACCU-CHEK GLUCOSE    Collection Time: 20  8:13 PM   Result Value Ref Range    Glucose - Accu-Ck 108 (H) 65 - 99 mg/dL       Imaging  CXR(reviewed): cardiomegaly, clear lung fields    Assessment/Plan  AV fistula occlusion (HCC)- (present on admission)  Assessment & Plan  Will contact vascular in the morning for repair    ESRD (end stage renal disease) on dialysis (HCC)- (present on admission)  Assessment & Plan  Nephrology aware  Emergent temp HD  catheter placed  HD tonight for hyperkalemia    Hyperkalemia  Assessment & Plan  Life threateningly high  S/p calcium, insulin/d50  Emergent temp HD catheter placed  Emergent dialysis    Chronic lupus nephritis (HCC)- (present on admission)  Assessment & Plan  Continue home meds      Discussed patient condition and risk of morbidity and/or mortality with RN, RT, Therapies, [facility] resident, Patient and nephrology and [facility].    The patient remains critically ill with life threateningly high hyperkalemia that requires emergent placement of HD catheter and emergent HD.  Critical care time = 50 minutes in directly providing and coordinating critical care and extensive data review.  No time overlap and excludes procedures.

## 2020-12-05 NOTE — ASSESSMENT & PLAN NOTE
- BP at highest 184/79 in ER, clonidine given in ER  -Likely secondary to lupus diagnosis and consequent ESRD  -Continue home amlodipine  -add on 5mg lisinopril for optimal BP control   -prn IV labetalol ordered BP>180/110

## 2020-12-05 NOTE — ASSESSMENT & PLAN NOTE
-On dialysis MWF, reports has been on dialysis for 4 years  -on velphoro phos binder not in formulary, per pt family to bring this to continue  -complicated by hyperkalemia secondary to AVG occlusion

## 2020-12-05 NOTE — ED NOTES
Ian received from JOSETTE Plasencia. Made aware of critical potassium of 7.3 and creatinine. Dr. Romero contacted via [employer] for same. Pt currently on cardiac monitor, NSR. c/o generalized muscle pain.

## 2020-12-06 ENCOUNTER — APPOINTMENT (OUTPATIENT)
Dept: RADIOLOGY | Facility: MEDICAL CENTER | Age: 40
DRG: 228 | End: 2020-12-06
Attending: STUDENT IN AN ORGANIZED HEALTH CARE EDUCATION/TRAINING PROGRAM
Payer: MEDICARE

## 2020-12-06 PROBLEM — Z95.828 PORT-A-CATH IN PLACE: Status: RESOLVED | Noted: 2020-12-05 | Resolved: 2020-12-06

## 2020-12-06 PROBLEM — R11.2 NAUSEA & VOMITING: Status: RESOLVED | Noted: 2017-11-21 | Resolved: 2020-12-06

## 2020-12-06 LAB
ALBUMIN SERPL BCP-MCNC: 3.9 G/DL (ref 3.2–4.9)
ALBUMIN/GLOB SERPL: 1.4 G/DL
ALP SERPL-CCNC: 98 U/L (ref 30–99)
ALT SERPL-CCNC: 9 U/L (ref 2–50)
ANION GAP SERPL CALC-SCNC: 12 MMOL/L (ref 7–16)
AST SERPL-CCNC: 9 U/L (ref 12–45)
BILIRUB SERPL-MCNC: 0.2 MG/DL (ref 0.1–1.5)
BUN SERPL-MCNC: 43 MG/DL (ref 8–22)
CALCIUM SERPL-MCNC: 8.7 MG/DL (ref 8.5–10.5)
CHLORIDE SERPL-SCNC: 98 MMOL/L (ref 96–112)
CO2 SERPL-SCNC: 27 MMOL/L (ref 20–33)
CREAT SERPL-MCNC: 11.95 MG/DL (ref 0.5–1.4)
GLOBULIN SER CALC-MCNC: 2.8 G/DL (ref 1.9–3.5)
GLUCOSE SERPL-MCNC: 98 MG/DL (ref 65–99)
POTASSIUM SERPL-SCNC: 5.1 MMOL/L (ref 3.6–5.5)
PROT SERPL-MCNC: 6.7 G/DL (ref 6–8.2)
SODIUM SERPL-SCNC: 137 MMOL/L (ref 135–145)

## 2020-12-06 PROCEDURE — A9270 NON-COVERED ITEM OR SERVICE: HCPCS | Performed by: STUDENT IN AN ORGANIZED HEALTH CARE EDUCATION/TRAINING PROGRAM

## 2020-12-06 PROCEDURE — 80053 COMPREHEN METABOLIC PANEL: CPT

## 2020-12-06 PROCEDURE — 700111 HCHG RX REV CODE 636 W/ 250 OVERRIDE (IP): Performed by: STUDENT IN AN ORGANIZED HEALTH CARE EDUCATION/TRAINING PROGRAM

## 2020-12-06 PROCEDURE — 700102 HCHG RX REV CODE 250 W/ 637 OVERRIDE(OP): Performed by: STUDENT IN AN ORGANIZED HEALTH CARE EDUCATION/TRAINING PROGRAM

## 2020-12-06 PROCEDURE — 99233 SBSQ HOSP IP/OBS HIGH 50: CPT | Mod: GC | Performed by: HOSPITALIST

## 2020-12-06 PROCEDURE — 93990 DOPPLER FLOW TESTING: CPT

## 2020-12-06 PROCEDURE — 770020 HCHG ROOM/CARE - TELE (206)

## 2020-12-06 RX ORDER — AMOXICILLIN 250 MG
2 CAPSULE ORAL 2 TIMES DAILY
Status: DISCONTINUED | OUTPATIENT
Start: 2020-12-06 | End: 2020-12-08 | Stop reason: HOSPADM

## 2020-12-06 RX ORDER — BISACODYL 10 MG
10 SUPPOSITORY, RECTAL RECTAL
Status: DISCONTINUED | OUTPATIENT
Start: 2020-12-06 | End: 2020-12-08 | Stop reason: HOSPADM

## 2020-12-06 RX ORDER — HEPARIN SODIUM 5000 [USP'U]/ML
5000 INJECTION, SOLUTION INTRAVENOUS; SUBCUTANEOUS EVERY 8 HOURS
Status: COMPLETED | OUTPATIENT
Start: 2020-12-06 | End: 2020-12-06

## 2020-12-06 RX ORDER — POLYETHYLENE GLYCOL 3350 17 G/17G
1 POWDER, FOR SOLUTION ORAL
Status: DISCONTINUED | OUTPATIENT
Start: 2020-12-06 | End: 2020-12-08 | Stop reason: HOSPADM

## 2020-12-06 RX ORDER — LISINOPRIL 5 MG/1
5 TABLET ORAL
Status: DISCONTINUED | OUTPATIENT
Start: 2020-12-06 | End: 2020-12-08

## 2020-12-06 RX ADMIN — LISINOPRIL 5 MG: 5 TABLET ORAL at 10:27

## 2020-12-06 RX ADMIN — HEPARIN SODIUM 5000 UNITS: 5000 INJECTION, SOLUTION INTRAVENOUS; SUBCUTANEOUS at 15:08

## 2020-12-06 RX ADMIN — HEPARIN SODIUM 5000 UNITS: 5000 INJECTION, SOLUTION INTRAVENOUS; SUBCUTANEOUS at 20:40

## 2020-12-06 RX ADMIN — PROMETHAZINE HYDROCHLORIDE 25 MG: 25 TABLET ORAL at 17:13

## 2020-12-06 RX ADMIN — Medication 1000 UNITS: at 05:27

## 2020-12-06 RX ADMIN — OMEPRAZOLE 20 MG: 20 CAPSULE, DELAYED RELEASE ORAL at 05:31

## 2020-12-06 RX ADMIN — LACOSAMIDE 100 MG: 100 TABLET, FILM COATED ORAL at 17:12

## 2020-12-06 RX ADMIN — PREGABALIN 50 MG: 25 CAPSULE ORAL at 05:27

## 2020-12-06 RX ADMIN — OXYCODONE HYDROCHLORIDE 20 MG: 10 TABLET ORAL at 15:08

## 2020-12-06 RX ADMIN — DOCUSATE SODIUM 50 MG AND SENNOSIDES 8.6 MG 1 TABLET: 8.6; 5 TABLET, FILM COATED ORAL at 17:15

## 2020-12-06 RX ADMIN — OXYCODONE HYDROCHLORIDE 20 MG: 10 TABLET ORAL at 05:31

## 2020-12-06 RX ADMIN — FERROUS SULFATE TAB 325 MG (65 MG ELEMENTAL FE) 325 MG: 325 (65 FE) TAB at 05:27

## 2020-12-06 RX ADMIN — HYDROXYCHLOROQUINE SULFATE 200 MG: 200 TABLET ORAL at 05:31

## 2020-12-06 RX ADMIN — AMLODIPINE BESYLATE 10 MG: 10 TABLET ORAL at 17:12

## 2020-12-06 RX ADMIN — LACOSAMIDE 100 MG: 100 TABLET, FILM COATED ORAL at 05:27

## 2020-12-06 RX ADMIN — PREGABALIN 50 MG: 25 CAPSULE ORAL at 17:12

## 2020-12-06 RX ADMIN — OXYCODONE HYDROCHLORIDE AND ACETAMINOPHEN 500 MG: 500 TABLET ORAL at 05:27

## 2020-12-06 ASSESSMENT — ENCOUNTER SYMPTOMS
DEPRESSION: 0
TINGLING: 0
BACK PAIN: 0
CONSTIPATION: 0
DIZZINESS: 0
PALPITATIONS: 0
ABDOMINAL PAIN: 0
WHEEZING: 0
SINUS PAIN: 0
WEIGHT LOSS: 0
NERVOUS/ANXIOUS: 0
MEMORY LOSS: 0
FLANK PAIN: 0
CHILLS: 0
VOMITING: 0
WEAKNESS: 1
SORE THROAT: 0
HEADACHES: 0
FEVER: 0
COUGH: 0
NECK PAIN: 0
SHORTNESS OF BREATH: 0
TREMORS: 0
BRUISES/BLEEDS EASILY: 0
NAUSEA: 0
DIARRHEA: 0

## 2020-12-06 NOTE — PROGRESS NOTES
Assumed care of patient at 0715.  Bedside report received from night RN.  Patient AO x 4.  Pt reports lower back pain (7); applied heat.  Bed locked and lowered, call light within reach, and question answered at present time.

## 2020-12-06 NOTE — PROGRESS NOTES
Progress Note    CC: f/u for subacute RUE AVG thrombosis    Interval History: 40 y.o. female w/ Lupus, HTN, seizures, and ESRD via RUE AVG admitted w/ subacute thrombosis. Last successful HD was over a week ago. Presented 12/2 w/ thrombosis and discharged.     No new complaints today.    Review of Systems  Negative for chest pain or SOB  Negative for stroke/TIA    Medications  Prior to Admission Medications   Prescriptions Last Dose Informant Patient Reported? Taking?   Cholecalciferol (VITAMIN D3) 79743 UNIT Cap 12/4/2020 at 0800 Patient Yes No   Sig: Take 10,000 Units by mouth every day.   Naloxone (NARCAN) 4 MG/0.1ML Liquid NEVER USED at NEVER USED Patient No No   Sig: Spray 4 mg in nose as needed (For severe sleepiness or difficulty breathing from possible overdose. Call 911 after administration.).   Patient not taking: Reported on 12/4/2020   Oxycodone HCl 20 MG Tab 12/4/2020 at 0900 Patient No No   Sig: Take 1 Tab by mouth 4 times a day as needed (pain) for up to 28 days.   Oxycodone HCl 20 MG Tab DUPLICATE at DUPLICATE Patient No No   Sig: Take 1 Tab by mouth 4 times a day as needed (pain) for up to 28 days.   Patient not taking: Reported on 12/4/2020   VELPHORO 500 MG Chew Tab 12/3/2020 at pm Patient No No   Sig: TAKE 2 TABLETS BY MOUTH THREE TIMES DAILY WITH EACH MEALS   amLODIPine (NORVASC) 10 MG Tab 12/3/2020 at pm Patient No No   Sig: Take 1 Tab by mouth every day.   ascorbic acid (ASCORBIC ACID) 500 MG Tab 12/4/2020 at 0800 Patient Yes No   Sig: Take 500 mg by mouth every day.   docusate sodium (COLACE) 100 MG Cap 12/4/2020 at 0800 Patient Yes No   Sig: Take 100 mg by mouth every day.   ferrous sulfate 325 (65 FE) MG tablet 12/4/2020 at 0800 Patient Yes No   Sig: Take 325 mg by mouth every day.   fluticasone (FLONASE) 50 MCG/ACT nasal spray 12/4/2020 at 0800 Patient No No   Sig: SHAKE LIQUID AND USE 1 SPRAY IN EACH NOSTRIL EVERY DAY   hydroxychloroquine (PLAQUENIL) 200 MG Tab 12/4/2020 at 0800  Patient Yes No   Sig: Take 200 mg by mouth every morning.   lacosamide (VIMPAT) 100 MG Tab tablet 12/4/2020 at 0800 Patient Yes No   Sig: Take 100 mg by mouth 2 Times a Day.   omeprazole (PRILOSEC) 20 MG delayed-release capsule 12/4/2020 at 0800 Patient No No   Sig: Take 1 Cap by mouth every day.   pregabalin (LYRICA) 50 MG capsule 12/4/2020 at 0800 Patient No No   Sig: Take 1 Cap by mouth 2 times a day for 28 days.   pregabalin (LYRICA) 50 MG capsule DUPLICATE at DUPLICATE Patient No No   Sig: Take 1 Cap by mouth 2 times a day for 28 days.   Patient not taking: Reported on 12/4/2020   promethazine (PHENERGAN) 25 MG Tab 12/4/2020 at 0900 Patient No No   Sig: TAKE 1 TABLET BY MOUTH EVERY 8 HOURS AS NEEDED FOR NAUSEA OR VOMITING   tizanidine (ZANAFLEX) 4 MG Tab 12/3/2020 at pm Patient No No   Sig: TAKE 1 TABLET BY MOUTH EVERY 6 HOURS AS NEEDED FOR MUSCLE SPASMS   traZODone (DESYREL) 50 MG Tab 12/3/2020 at pm Patient No No   Sig: Take 1 Tab by mouth every bedtime.   valACYclovir (VALTREX) 500 MG Tab NOT TAKING at NOT TAKING Patient No No   Sig: Take 1 Tab by mouth 2 times a day as needed (cold sores).   Patient not taking: Reported on 12/4/2020      Facility-Administered Medications: None         Physical Exam  Vitals:    12/06/20 0749   BP: 152/97   Pulse: 71   Resp: 16   Temp: 36.3 °C (97.4 °F)   SpO2: 98%       General appearance: NAD, conversing appropriately  Psych: Normal affect, mood, judgement  Neuro: CN II-XII grossly intact.   Neck: full range of motion  Lungs: No inspiratory stridor or wheezing  CV: RRR  Abdomen: Soft, NT/ND  Skin: No rashes  No pulse/thrill in the RUE graft      Labs reviewed today:  Recent Labs     12/04/20  1655 12/05/20  1235   WBC 5.2 3.0*   RBC 3.07* 3.09*   HEMOGLOBIN 9.9* 9.8*   HEMATOCRIT 30.1* 29.6*   MCV 98.0* 95.8   MCH 32.2 31.7   MCHC 32.9* 33.1*   RDW 41.6 40.0   PLATELETCT 100* 103*   MPV 10.6 10.6     Recent Labs     12/04/20  1655 12/05/20  1235 12/06/20  0902   SODIUM 133*  137 137   POTASSIUM 7.3* 5.4 5.1   CHLORIDE 93* 96 98   CO2 18* 27 27   GLUCOSE 78 62* 98   BUN 87* 32* 43*   CREATININE 20.42* 10.67* 11.95*   CALCIUM 8.2* 8.6 8.7     Recent Labs     20  1655 20  1235 20  0902   ALTSGPT 7 9 9   ASTSGOT 8* 10* 9*   ALKPHOSPHAT 103* 97 98   TBILIRUBIN 0.2 0.2 0.2   GLUCOSE 78 62* 98     Recent Labs     20  1655   APTT 38.5*   INR 1.07             No results for input(s): TROPONINI in the last 72 hours.    Urinalysis:    No results found     Imaging & Data Review:  I personally reviewed all non-invasive vascular testing including images, x-rays, tracings, arterial waveforms, and duplex exams relevant to this admission. My interpretation is below:    AVF Duplex: pending      Assessment/Plan & Medical Decision-MakinF w/ multiple comorbidities admitted w/ RUE AVG subacute thrombosis. Access known to be problematic for months.    Attempted to schedule thrombectomy today. No OR availability d/t multiple cases.    Attempting to schedule sidney for thrombectomy, fistulogram, poss permacath, and port removal. NPO p MN.     Thank you for involving us in this patient's care. Please call with questions.    Daniel Poole MD  Vascular Surgeon  [facility]  Office: [phone]

## 2020-12-06 NOTE — CONSULTS
Date of Service  12/5/2020    Reason For Consult  Thrombosed RUE AVG    Requesting Physician  Neftaly Ojeda MD    Consulting Physician  Daniel Poole M.D.    Primary Care Physician  Sushila Manzano M.D.    Chief Complaint  Unable to dialyze    History of Present Illness  Ms. Carrizales is a 40 y.o. female w/ Lupus, HTN, ESRD on MWF HD, fibromyalgia, seizures, avascular necrosis of the R hip s/p replacement admitted with a thrombosed RUE brach/ax graft.    She has not been seen by our practice for well over a year but states her access has been problematic for months. Her last HD through the graft was 11/27 and she presented on 12/2 with thrombosis but was discharged without vascular consult or placement of any HD access. She apparently had outpt f/u scheduled with [facility] six days after that presentation with a plan for temp cath and ?thrombectomy at that time.    She returned last night with severe hyperkalemia and a temp cath was placed emergently in the ED.    On review, she's had several interventions over the past few years for salvage including revision of both the inflow and outflow anastomoses, angioplasties, and over a year ago, was identified as needing a stent in the outflow. Unclear if that was ever done.     Review of Systems  Review of Systems   Constitutional: Negative.    HENT: Negative.    Eyes: Negative.    Respiratory: Negative.    Cardiovascular: Negative.    Gastrointestinal: Negative.    Genitourinary: Negative.    Musculoskeletal: Negative.    Skin: Negative.    Neurological: Negative.    Endo/Heme/Allergies: Negative.    Psychiatric/Behavioral: Negative.        Past Medical History  Past Medical History:   Diagnosis Date   • Arthritis     all joints,r/t lupus   • Avascular necrosis of bones of both hips (HCC) 10/10/2016   • Clostridium difficile colitis 5/3/2011   • Dialysis patient    • ESBL (extended spectrum beta-lactamase) producing bacteria infection 8/25/2014   •  Fibromyalgia    • Hypertension    • Lupus (HCC)    • Pneumonia    • Psychiatric disorder     anxiety, depression   • Pyelonephritis 11/21/2017   • Renal failure    • Sepsis due to pneumonia ([facility]) 6/7/2018   • Status epilepticus ([facility]) 12/13/2018       Surgical History  Past Surgical History:   Procedure Laterality Date   • GAYATHRI N/A 8/20/2019    Procedure: ECHOCARDIOGRAM, TRANSESOPHAGEAL;  Surgeon: Marta Elaine M.D.;  Location: SURGERY [facility];  Service: Cardiac   • AV FISTULA REVISION Right 8/11/2018    Procedure: AV FISTULA REVISION- Graft and Thrombectomy;  Surgeon: Shabbir Ardon M.D.;  Location: SURGERY [facility];  Service: General   • AV FISTULA THROMBOLYSIS Right 8/11/2018    Procedure: AV FISTULA THROMBOLYSIS;  Surgeon: Shabbir Ardon M.D.;  Location: [facility];  Service: General   • AV FISTULA CREATION Right 12/9/2017    Procedure: AV FISTULA CREATION-ARM FOR GRAFT;  Surgeon: Lesly Jansen M.D.;  Location: SURGERY [facility];  Service: General   • THROMBECTOMY  12/9/2017    Procedure: THROMBECTOMY;  Surgeon: Lesly Jansen M.D.;  Location: SURGERY [facility];  Service: General   • THROMBECTOMY Right 8/21/2016    Procedure: THROMBECTOMY - right AV fistula graft with grams;  Surgeon: Shabbir Ardon M.D.;  Location: [facility];  Service:    • ANGIOPLASTY BALLOON  8/21/2016    Procedure: ANGIOPLASTY BALLOON;  Surgeon: Shabbir Ardon M.D.;  Location: [facility];  Service:    • THROMBECTOMY Right 8/20/2016    Procedure: THROMBECTOMY AV GRAFT;  Surgeon: Shabbir Ardon M.D.;  Location: [facility];  Service:    • AV FISTULA CREATION Right 7/12/2016    Procedure: AV FISTULA CREATION WITH GRAFT BRACHIAL AXILLARY;  Surgeon: Shabbir Ardon M.D.;  Location: [facility];  Service:    • CLOSED REDUCTION Right 7/5/2016    Procedure: CLOSED REDUCTION- Hip ;  Surgeon: Michael Holman M.D.;  Location:  SURGERY [facility];  Service:    • HIP ARTHROPLASTY TOTAL Right 1/18/2016    Procedure: HIP ARTHROPLASTY TOTAL;  Surgeon: Michael Holman M.D.;  Location: SURGERY [facility];  Service:    • AV FISTULA CREATION  2/3/2015    Performed by Shabbir Ardon M.D. at SURGERY [facility]   • AV FISTULA CREATION  11/14/2014    Performed by Shabbir Ardon M.D. at SURGERY [facility]   • AV FISTULA CREATION  9/9/2014    Performed by Shabbir Ardon M.D. at SURGERY [facility]   • CATH PLACEMENT  9/9/2014    Performed by Shabbir Ardon M.D. at SURGERY [facility]   • OTHER  5/2011    tracheostomy   • GASTROSCOPY-ENDO  4/27/2011    Performed by PALMIRA DOAN at ENDOSCOPY [facility]   • COLONOSCOPY WITH BIOPSY  4/20/2011    Performed by ALCIRA CRUZ at ENDOSCOPY [facility]   • GASTROSCOPY-ENDO  4/18/2011    Performed by ALCIRA CRUZ at ENDOSCOPY [facility]   • GASTROSCOPY-ENDO  4/10/2011    Performed by SYED JURADO at ENDOSCOPY [facility]   • SCLEROTHERAPHY  4/10/2011    Performed by SYED JURADO at ENDOSCOPY [facility]   • OTHER ABDOMINAL SURGERY      kidney biopsy   • TRACHEOSTOMY          Family History  Family History   Problem Relation Age of Onset   • Heart Disease Mother    • Cancer Father         Social History  Social History     Socioeconomic History   • Marital status: Single     Spouse name: Not on file   • Number of children: Not on file   • Years of education: Not on file   • Highest education level: Not on file   Occupational History   • Not on file   Social Needs   • Financial resource strain: Not on file   • Food insecurity     Worry: Not on file     Inability: Not on file   • Transportation needs     Medical: Not on file     Non-medical: Not on file   Tobacco Use   • Smoking status: Former Smoker     Packs/day: 0.50     Years: 18.00     Pack years: 9.00     Types: Cigarettes     Start date: 1995     Quit date:  2011     Years since quittin.4   • Smokeless tobacco: Never Used   • Tobacco comment: started at 15   Substance and Sexual Activity   • Alcohol use: No     Alcohol/week: 0.0 oz     Frequency: Never     Binge frequency: Never   • Drug use: No     Types: Marijuana   • Sexual activity: Not Currently     Partners: Male   Lifestyle   • Physical activity     Days per week: Not on file     Minutes per session: Not on file   • Stress: Not on file   Relationships   • Social connections     Talks on phone: Not on file     Gets together: Not on file     Attends [employer] service: Not on file     Active member of club or organization: Not on file     Attends meetings of clubs or organizations: Not on file     Relationship status: Not on file   • Intimate partner violence     Fear of current or ex partner: Not on file     Emotionally abused: Not on file     Physically abused: Not on file     Forced sexual activity: Not on file   Other Topics Concern   •  Service No   • Blood Transfusions Yes   • Caffeine Concern No   • Occupational Exposure No   • Hobby Hazards No   • Sleep Concern Yes   • Stress Concern Yes   • Weight Concern Yes   • Special Diet Yes   • Back Care No   • Exercise Yes   • Bike Helmet No   • Seat Belt Yes   • Self-Exams Yes   Social History Narrative   • Not on file       Medications  Prior to Admission Medications   Prescriptions Last Dose Informant Patient Reported? Taking?   Cholecalciferol (VITAMIN D3) 44012 UNIT Cap 2020 at 0800 Patient Yes No   Sig: Take 10,000 Units by mouth every day.   Naloxone (NARCAN) 4 MG/0.1ML Liquid NEVER USED at NEVER USED Patient No No   Sig: Spray 4 mg in nose as needed (For severe sleepiness or difficulty breathing from possible overdose. Call 911 after administration.).   Patient not taking: Reported on 2020   Oxycodone HCl 20 MG Tab 2020 at 0900 Patient No No   Sig: Take 1 Tab by mouth 4 times a day as needed (pain) for up to 28 days.      Oxycodone HCl 20 MG Tab DUPLICATE at DUPLICATE Patient No No   Sig: Take 1 Tab by mouth 4 times a day as needed (pain) for up to 28 days.   Patient not taking: Reported on 12/4/2020   VELPHORO 500 MG Chew Tab 12/3/2020 at pm Patient No No   Sig: TAKE 2 TABLETS BY MOUTH THREE TIMES DAILY WITH EACH MEALS   amLODIPine (NORVASC) 10 MG Tab 12/3/2020 at pm Patient No No   Sig: Take 1 Tab by mouth every day.   ascorbic acid (ASCORBIC ACID) 500 MG Tab 12/4/2020 at 0800 Patient Yes No   Sig: Take 500 mg by mouth every day.   docusate sodium (COLACE) 100 MG Cap 12/4/2020 at 0800 Patient Yes No   Sig: Take 100 mg by mouth every day.   ferrous sulfate 325 (65 FE) MG tablet 12/4/2020 at 0800 Patient Yes No   Sig: Take 325 mg by mouth every day.   fluticasone (FLONASE) 50 MCG/ACT nasal spray 12/4/2020 at 0800 Patient No No   Sig: SHAKE LIQUID AND USE 1 SPRAY IN EACH NOSTRIL EVERY DAY   hydroxychloroquine (PLAQUENIL) 200 MG Tab 12/4/2020 at 0800 Patient Yes No   Sig: Take 200 mg by mouth every morning.   lacosamide (VIMPAT) 100 MG Tab tablet 12/4/2020 at 0800 Patient Yes No   Sig: Take 100 mg by mouth 2 Times a Day.   omeprazole (PRILOSEC) 20 MG delayed-release capsule 12/4/2020 at 0800 Patient No No   Sig: Take 1 Cap by mouth every day.   pregabalin (LYRICA) 50 MG capsule 12/4/2020 at 0800 Patient No No   Sig: Take 1 Cap by mouth 2 times a day for 28 days.   pregabalin (LYRICA) 50 MG capsule DUPLICATE at DUPLICATE Patient No No   Sig: Take 1 Cap by mouth 2 times a day for 28 days.   Patient not taking: Reported on 12/4/2020   promethazine (PHENERGAN) 25 MG Tab 12/4/2020 at 0900 Patient No No   Sig: TAKE 1 TABLET BY MOUTH EVERY 8 HOURS AS NEEDED FOR NAUSEA OR VOMITING   tizanidine (ZANAFLEX) 4 MG Tab 12/3/2020 at pm Patient No No   Sig: TAKE 1 TABLET BY MOUTH EVERY 6 HOURS AS NEEDED FOR MUSCLE SPASMS   traZODone (DESYREL) 50 MG Tab 12/3/2020 at pm Patient No No   Sig: Take 1 Tab by mouth every bedtime.   valACYclovir (VALTREX)  500 MG Tab NOT TAKING at NOT TAKING Patient No No   Sig: Take 1 Tab by mouth 2 times a day as needed (cold sores).   Patient not taking: Reported on 12/4/2020      Facility-Administered Medications: None       Current Facility-Administered Medications   Medication Dose Route Frequency Provider Last Rate Last Admin   • labetalol (NORMODYNE/TRANDATE) injection 10 mg  10 mg Intravenous Q4HRS PRN Neftaly Ojeda M.D.       • senna-docusate (PERICOLACE or SENOKOT S) 8.6-50 MG per tablet 2 Tab  2 Tab Oral BID Neftaly Ojeda M.D.        And   • polyethylene glycol/lytes (MIRALAX) PACKET 1 Packet  1 Packet Oral QDAY PRN Neftaly Ojeda M.D.        And   • bisacodyl (DULCOLAX) suppository 10 mg  10 mg Rectal QDAY PRN Neftaly Ojeda M.D.       • acetaminophen (Tylenol) tablet 650 mg  650 mg Oral Q6HRS PRN Neftaly Ojeda M.D.       • promethazine (PHENERGAN) tablet 25 mg  25 mg Oral Q8HRS PRN Neftaly Ojeda M.D.       • tizanidine (ZANAFLEX) tablet 4 mg  4 mg Oral Q6HRS PRN Neftaly Ojeda M.D.       • traZODone (DESYREL) tablet 50 mg  50 mg Oral QHS Neftaly Ojeda M.D.       • pregabalin (LYRICA) capsule 50 mg  50 mg Oral BID Neftaly Ojeda M.D.   50 mg at 12/05/20 0531   • oxyCODONE immediate-release (ROXICODONE) tablet 20 mg  20 mg Oral 4X/DAY PRN Neftaly Ojeda M.D.   20 mg at 12/05/20 0838   • omeprazole (PRILOSEC) capsule 20 mg  20 mg Oral DAILY Neftaly Ojeda M.D.   20 mg at 12/05/20 0531   • lacosamide (VIMPAT) tablet 100 mg  100 mg Oral BID Neftaly Ojeda M.D.   100 mg at 12/05/20 0530   • hydroxychloroquine (Plaquenil) tablet 200 mg  200 mg Oral QAM Nefatly Ojeda M.D.   200 mg at 12/05/20 0530   • fluticasone (FLONASE) nasal spray 50 mcg  1 Spray Nasal DAILY Neftaly Ojeda M.D.       • ferrous sulfate tablet 325 mg  325 mg Oral DAILY Neftaly Ojeda M.D.   325 mg at 12/05/20 0531   • vitamin D (cholecalciferol) tablet 1,000 Units  1,000 Units Oral DAILY Neftaly Ojeda M.D.   1,000 Units at 12/05/20 0531   • ascorbic acid tablet 500 mg  500 mg Oral  DAILY Neftaly Ojeda M.D.   500 mg at 12/05/20 0530   • amLODIPine (NORVASC) tablet 10 mg  10 mg Oral DAILY Neftaly Ojeda M.D.   10 mg at 12/04/20 2329   • heparin injection 5,000 Units  5,000 Units Subcutaneous Q12HRS Neftaly Ojeda M.D.   5,000 Units at 12/05/20 0831       Allergies  Allergies   Allergen Reactions   • Lorazepam [Ativan] Anxiety     Patient becomes severely paranoid and agitated   • Morphine Itching     Tolerates Dilaudid   • Seasonal Runny Nose and Itching     Hay fever, sabiha brush         Physical Exam  Temp:  [36.3 °C (97.3 °F)-37.3 °C (99.1 °F)] 37.2 °C (98.9 °F)  Pulse:  [] 60  Resp:  [16-60] 17  BP: (116-184)/() 172/98  SpO2:  [90 %-98 %] 96 %    General appearance: NAD, conversing appropriately  Psych: Normal affect, mood, judgement  Neuro: CN II-XII grossly intact.   Neck: full range of motion  Lungs: No inspiratory stridor or wheezing  CV: RRR  Abdomen: Soft, NT/ND  Skin: No rashes  Ext: no thrill/pulse in the RUE AVG    Labs Reviewed Today:  Recent Labs     12/04/20 1655 12/05/20  1235   WBC 5.2 3.0*   RBC 3.07* 3.09*   HEMOGLOBIN 9.9* 9.8*   HEMATOCRIT 30.1* 29.6*   MCV 98.0* 95.8   MCH 32.2 31.7   MCHC 32.9* 33.1*   RDW 41.6 40.0   PLATELETCT 100* 103*   MPV 10.6 10.6     Recent Labs     12/04/20  1655 12/05/20  1235   SODIUM 133* 137   POTASSIUM 7.3* 5.4   CHLORIDE 93* 96   CO2 18* 27   GLUCOSE 78 62*   BUN 87* 32*   CREATININE 20.42* 10.67*   CALCIUM 8.2* 8.6     Recent Labs     12/04/20  1655 12/05/20  1235   ALTSGPT 7 9   ASTSGOT 8* 10*   ALKPHOSPHAT 103* 97   TBILIRUBIN 0.2 0.2   GLUCOSE 78 62*     Recent Labs     12/04/20  1655   APTT 38.5*   INR 1.07             No results for input(s): TROPONINI in the last 72 hours.    Urinalysis:    No results found     Imaging Reviewed Today:  I personally reviewed all non-invasive vascular testing including images, x-rays, tracings, arterial waveforms, and duplex exams relevant to this admission. My interpretation is below:    AVF  duplex pending    Assessment/Plan & Medical Decision-Making  40F admitted with subacute thrombosis of her RUE AVG. Access has known problems and thrombosed several times in the past, and likely >1wk ago this time around.     Will attempt salvage but it's likely she will need a permacath at this point given history and delayed presentation.    Daniel Poole MD  Vascular Surgeon  [facility]  Office: [phone]

## 2020-12-06 NOTE — PROGRESS NOTES
Daily Progress Note:     Date of Service: 12/6/2020  Primary Team: [facility] Team   Attending: Rajesh Moran M.D.   Senior Resident: Jeremiah Castellon D.O.  Intern: Dr. Zeenat Duran  Contact:  [phone]    Chief Complaint:   Hyperkalemia  ESRD    Subjective:   Pt reported being overall tired today. She denied worsening cramping or leg pain. She denied chest pain or palpitations. She remained NSR on telemetry. Per vascular planning for RUE thrombectomy and graft revision versus permacath placement.         Consultants/Specialty:  Vascular Surgery- Dr. Poole  Nephrology- Dr. Barrientos    Review of Systems:    Review of Systems   Constitutional: Positive for malaise/fatigue. Negative for chills, fever and weight loss.   HENT: Negative for congestion, ear discharge, ear pain, sinus pain and sore throat.    Respiratory: Negative for cough, shortness of breath and wheezing.    Cardiovascular: Negative for chest pain, palpitations and leg swelling.   Gastrointestinal: Negative for abdominal pain, constipation, diarrhea, nausea and vomiting.   Genitourinary: Negative for dysuria, flank pain, frequency, hematuria and urgency.   Musculoskeletal: Negative for back pain, joint pain and neck pain.   Skin: Negative for itching and rash.   Neurological: Positive for weakness. Negative for dizziness, tingling, tremors and headaches.   Endo/Heme/Allergies: Does not bruise/bleed easily.   Psychiatric/Behavioral: Negative for depression and memory loss. The patient is not nervous/anxious.        Objective:   Physical Exam:   Vitals:   Temp:  [36.3 °C (97.4 °F)-37.2 °C (98.9 °F)] 36.8 °C (98.2 °F)  Pulse:  [60-71] 70  Resp:  [16-17] 16  BP: (152-172)/() 156/102  SpO2:  [93 %-98 %] 93 %    Physical Exam  Constitutional:       Appearance: Normal appearance.   HENT:      Head: Normocephalic and atraumatic.      Right Ear: External ear normal.      Left Ear: External ear normal.      Nose: Nose normal.      Mouth/Throat:      Mouth:  Mucous membranes are moist.   Eyes:      Extraocular Movements: Extraocular movements intact.      Pupils: Pupils are equal, round, and reactive to light.   Neck:      Musculoskeletal: Normal range of motion. No neck rigidity or muscular tenderness.   Cardiovascular:      Rate and Rhythm: Normal rate and regular rhythm.      Pulses: Normal pulses.      Heart sounds: Normal heart sounds.   Pulmonary:      Effort: Pulmonary effort is normal.      Breath sounds: Normal breath sounds.   Abdominal:      General: Abdomen is flat. Bowel sounds are normal. There is no distension.      Palpations: Abdomen is soft. There is no mass.      Tenderness: There is no abdominal tenderness.   Musculoskeletal: Normal range of motion.         General: No swelling.      Right lower leg: Edema present.      Left lower leg: Edema present.      Comments:   Tenderness to palpation at AV fistula site on right, port cath present on left side of chest   Skin:     General: Skin is warm and dry.      Capillary Refill: Capillary refill takes less than 2 seconds.   Neurological:      General: No focal deficit present.      Mental Status: She is alert and oriented to person, place, and time.   Psychiatric:         Mood and Affect: Mood normal.         Behavior: Behavior normal.           Labs:   Recent Labs     12/04/20 1655 12/05/20  1235   WBC 5.2 3.0*   RBC 3.07* 3.09*   HEMOGLOBIN 9.9* 9.8*   HEMATOCRIT 30.1* 29.6*   MCV 98.0* 95.8   MCH 32.2 31.7   RDW 41.6 40.0   PLATELETCT 100* 103*   MPV 10.6 10.6   NEUTSPOLYS 56.80 45.60   LYMPHOCYTES 26.60 34.70   MONOCYTES 12.20 15.70*   EOSINOPHILS 1.90 1.30   BASOPHILS 1.70 2.00*     Recent Labs     12/04/20 1655 12/05/20  1235 12/06/20  0902   SODIUM 133* 137 137   POTASSIUM 7.3* 5.4 5.1   CHLORIDE 93* 96 98   CO2 18* 27 27   GLUCOSE 78 62* 98   BUN 87* 32* 43*     Recent Labs     12/04/20 1655 12/05/20  1235 12/06/20  0902   ALBUMIN 4.0 3.7 3.9   TBILIRUBIN 0.2 0.2 0.2   ALKPHOSPHAT 103* 97 98    TOTPROTEIN 6.9 6.3 6.7   ALTSGPT 7 9 9   ASTSGOT 8* 10* 9*   CREATININE 20.42* 10.67* 11.95*       Imaging:   US-HEMODIALYSIS GRAFT DUPLEX COMP UPPER EXTREMITY   Final Result      DX-CHEST-LIMITED (1 VIEW)   Final Result         1.  Mild interstitial pulmonary parenchymal prominence, appearance suggests mild interstitial edema and/or infiltrates.   2.  Cardiomegaly      DX-CHEST-PORTABLE (1 VIEW)   Final Result      Again seen cardiomegaly.          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 No EKG abnormalities, was treated w/ IV calcium gluconate, insulin, and dextrose and emergent HD 12/5/2020 w/ subsequent resolution of hyperkalemia K 5.4.  RUE US w/ doppler demonstrated right AV graft thrombosed from arterial to venous anastomosis. Vascular to attempt thrombectomy versus permacath placement 12/7/2020. Also will remove port as increased risk for arrhythmias w/ current position abutting tricuspid valve; also limited utility in pt not requirement very frequent labs. Nephro will dictate HD needs, for now will continue w/ MWF HD while pt is inpatient. Adding on lisinopril 5mg for better BP control. On day of dialysis, give lisinopril after dialysis to avoid it being dialyzed out.         * AV fistula occlusion (HCC)- (present on admission)  Assessment & Plan  - right brachial shunt was clotted off per patient, missed dialysis for 1 week  - Continue telemetry monitoring; so far NSR  - RUE US demonstrating total occlusion of AV graft--likely not going to be salvagable  - pending graft revision versus permacath placement per vascular on 12/7/2020  - NPO midnight; no sticks or BP measurements on right extremity  - port also noted to  abut tricuspid valve-->discussed w/ vascular they will remove this      Hyperkalemia- (present on admission)  Assessment & Plan  -Potassium 7.3 on admission.  No peaked T waves seen on EKG in ER.  -Like secondary to missed dialysis and ESRD secondary to SLE  -IV insulin, dextrose, calcium gluconate, patiromer ordered in ER by ED providr  -Dr. Pérez intensivist placed a trialysis HD catheter in the ER; pt s/p emergent HD on 12/5  -K recovered to 5.4 12/5  -no further need for daily BMPs    Hypertension- (present on admission)  Assessment & Plan  - BP at highest 184/79 in ER, clonidine given in ER  -Likely secondary to lupus diagnosis and consequent ESRD  -Continue home amlodipine  -add on 5mg lisinopril for optimal BP control   -prn IV labetalol ordered BP>180/110    ESRD (end stage renal disease) on dialysis ([facility])- (present on admission)  Assessment & Plan  -On dialysis MWF, reports has been on dialysis for 4 years  -on velphoro phos binder not in formulary, per pt family to bring this to continue  -complicated by hyperkalemia secondary to AVG occlusion    Lupus (systemic lupus erythematosus) ([facility])- (present on admission)  Assessment & Plan  -dx age 23, frequent loss to f/u  -symptoms include chronic joint pain and hip pain  -TTE 8/2019-->LVEF 65%  -complicated by ESRD on MWF iHD  -continue hydroxychloroquine      GERD (gastroesophageal reflux disease)- (present on admission)  Assessment & Plan  -Continue omeprazole    Seizure disorder ([facility])- (present on admission)  Assessment & Plan  -History of seizures since 2016 per neurology, last noted seizure found on records 10/2019, had missed meds.  -Per neurology records, last video EEG 1/02/19, noted:  1. Diffuse nonspecific cortical dysfunction - moderate  2. Focal cortical dysfunction / irritability over frontal (R>L)  - Continue home lacosamide    Anemia in chronic kidney disease, on chronic dialysis (HCC)- (present on admission)  Assessment & Plan  -Hemoglobin 9.9  on admission, chronically anemic  -Anemia likely secondary to chronic kidney disease and lupus  - CTM    Low back pain- (present on admission)  Assessment & Plan  - history of diffuse joint and body pain, low back, hips and legs, follows with physiatry  -Status post right total hip arthroplasty 1/2016  -Secondary to SLE w/ joint manifestations  -Continue home oxycodone dose as documented in previous clinic note 9/2020  -continue pregabalin pt w/ dx of fibromyalgia    DISPO- NPO midnight & hold SQH ppx at midnight pending AVG exploration/possible revision versus permacath placement 12/7/2020.

## 2020-12-06 NOTE — PROGRESS NOTES
"[facility] -  PROGRESS NOTE               Author: Corey Barrientos M.D. Date & Time: 12/6/2020  8:33 AM     HPI:  Patient is a 40 year old female withe a PMHx of lupus, HTN, ESRD on HD MWF, fibromyalgia, right hip replacement, status epilepticus seizures who presented 12/4/2020 for AVG malfunction and for repair.  Last dialysis was about a week ago, makes minimal urine.  Per records had clotted right brachial shunt.  Prior had right graft thrombectomy in 8/11/2019.  In the ER, had temporary dialysis catheter placed for emergent HD for hyperkalemia.      DAILY NEPHROLOGY SUMMARY:  12/5 - Consult seen  12/6 - Tolerated HD  12/7 - Denies pain or SOB.  No new complaints    REVIEW OF SYSTEMS:    GEN: No F/C  CV: No CP; No palpitations  RESP: No Cough; No SOB  GI: No pain; No N/V  MSK: No pain  All other systems reviewed and are negative    PMH/PSH/SH/FH: Reviewed and unchanged since admission  CURRENT MEDICATIONS: Reviewed from admission to present day    PHYSICAL EXAM:  VS:  /97   Pulse 71   Temp 36.3 °C (97.4 °F) (Temporal)   Resp 16   Ht 1.651 m (5' 5\")   Wt 83.1 kg (183 lb 3.2 oz)   LMP 11/18/2020   SpO2 93%   BMI 30.49 kg/m²   GEN: WDWN NAD  HENT: NC/AT; atraumatic external nose  EYES: No icterus  CVS: RRR; No m/r  RESP: CTA B, non-labored  GI: SNTND; +BS  MSK: No C/C/E  SKIN: No rash  IJ temp HD catheter    Fluids:  No intake/output data recorded.    LABS:  Recent Results (from the past 24 hour(s))   CBC WITH DIFFERENTIAL    Collection Time: 12/05/20 12:35 PM   Result Value Ref Range    WBC 3.0 (L) 4.8 - 10.8 K/uL    RBC 3.09 (L) 4.20 - 5.40 M/uL    Hemoglobin 9.8 (L) 12.0 - 16.0 g/dL    Hematocrit 29.6 (L) 37.0 - 47.0 %    MCV 95.8 81.4 - 97.8 fL    MCH 31.7 27.0 - 33.0 pg    MCHC 33.1 (L) 33.6 - 35.0 g/dL    RDW 40.0 35.9 - 50.0 fL    Platelet Count 103 (L) 164 - 446 K/uL    MPV 10.6 9.0 - 12.9 fL    Neutrophils-Polys 45.60 44.00 - 72.00 %    Lymphocytes 34.70 22.00 - 41.00 % "    Monocytes 15.70 (H) 0.00 - 13.40 %    Eosinophils 1.30 0.00 - 6.90 %    Basophils 2.00 (H) 0.00 - 1.80 %    Immature Granulocytes 0.70 0.00 - 0.90 %    Nucleated RBC 0.00 /100 WBC    Neutrophils (Absolute) 1.37 (L) 2.00 - 7.15 K/uL    Lymphs (Absolute) 1.04 1.00 - 4.80 K/uL    Monos (Absolute) 0.47 0.00 - 0.85 K/uL    Eos (Absolute) 0.04 0.00 - 0.51 K/uL    Baso (Absolute) 0.06 0.00 - 0.12 K/uL    Immature Granulocytes (abs) 0.02 0.00 - 0.11 K/uL    NRBC (Absolute) 0.00 K/uL   Comp Metabolic Panel    Collection Time: 12/05/20 12:35 PM   Result Value Ref Range    Sodium 137 135 - 145 mmol/L    Potassium 5.4 3.6 - 5.5 mmol/L    Chloride 96 96 - 112 mmol/L    Co2 27 20 - 33 mmol/L    Anion Gap 14.0 7.0 - 16.0    Glucose 62 (L) 65 - 99 mg/dL    Bun 32 (H) 8 - 22 mg/dL    Creatinine 10.67 (HH) 0.50 - 1.40 mg/dL    Calcium 8.6 8.5 - 10.5 mg/dL    AST(SGOT) 10 (L) 12 - 45 U/L    ALT(SGPT) 9 2 - 50 U/L    Alkaline Phosphatase 97 30 - 99 U/L    Total Bilirubin 0.2 0.1 - 1.5 mg/dL    Albumin 3.7 3.2 - 4.9 g/dL    Total Protein 6.3 6.0 - 8.2 g/dL    Globulin 2.6 1.9 - 3.5 g/dL    A-G Ratio 1.4 g/dL   ESTIMATED GFR    Collection Time: 12/05/20 12:35 PM   Result Value Ref Range    GFR If African American 5 (A) >60 mL/min/1.73 m 2    GFR If Non African American 4 (A) >60 mL/min/1.73 m 2       (click the triangle to expand results)    IMPRESSION:  - ESRD    * Etiology likely 2/2 Lupus    * Agree with vascular consult, may need tunneled dialysis line if treatment fails  - AVF occlusion    * Per vascular recommendations  - Hyperkalemia  - AGMA  - Systemic Lupus Erythematosus    * Continue home hydroxychloroquine  - HTN    * Goal BP < 140/90    * Stable  - Anemia of CKD    * Goal Hgb 10-11    * Stable  - CKD-MBD    * Managed at HD unit     PLAN:  - MWF iHD during stay  - Agree with vascular consult  - UF as tolerated  - No dietary protein restrictions  - Dose all meds per ESRD     Thank you for the consultation!

## 2020-12-07 ENCOUNTER — ANESTHESIA EVENT (OUTPATIENT)
Dept: SURGERY | Facility: MEDICAL CENTER | Age: 40
DRG: 228 | End: 2020-12-07
Payer: MEDICARE

## 2020-12-07 ENCOUNTER — APPOINTMENT (OUTPATIENT)
Dept: RADIOLOGY | Facility: MEDICAL CENTER | Age: 40
DRG: 228 | End: 2020-12-07
Attending: SURGERY
Payer: MEDICARE

## 2020-12-07 ENCOUNTER — ANESTHESIA (OUTPATIENT)
Dept: SURGERY | Facility: MEDICAL CENTER | Age: 40
DRG: 228 | End: 2020-12-07
Payer: MEDICARE

## 2020-12-07 LAB — B-HCG SERPL-ACNC: <1 MIU/ML (ref 0–5)

## 2020-12-07 PROCEDURE — C1757 CATH, THROMBECTOMY/EMBOLECT: HCPCS | Performed by: SURGERY

## 2020-12-07 PROCEDURE — 700111 HCHG RX REV CODE 636 W/ 250 OVERRIDE (IP): Performed by: RADIOLOGY

## 2020-12-07 PROCEDURE — 700101 HCHG RX REV CODE 250

## 2020-12-07 PROCEDURE — 5A1D70Z PERFORMANCE OF URINARY FILTRATION, INTERMITTENT, LESS THAN 6 HOURS PER DAY: ICD-10-PCS | Performed by: INTERNAL MEDICINE

## 2020-12-07 PROCEDURE — 700102 HCHG RX REV CODE 250 W/ 637 OVERRIDE(OP): Performed by: STUDENT IN AN ORGANIZED HEALTH CARE EDUCATION/TRAINING PROGRAM

## 2020-12-07 PROCEDURE — 0JPT0WZ REMOVAL OF TOTALLY IMPLANTABLE VASCULAR ACCESS DEVICE FROM TRUNK SUBCUTANEOUS TISSUE AND FASCIA, OPEN APPROACH: ICD-10-PCS | Performed by: SURGERY

## 2020-12-07 PROCEDURE — 160039 HCHG SURGERY MINUTES - EA ADDL 1 MIN LEVEL 3: Performed by: SURGERY

## 2020-12-07 PROCEDURE — 0JH63XZ INSERTION OF TUNNELED VASCULAR ACCESS DEVICE INTO CHEST SUBCUTANEOUS TISSUE AND FASCIA, PERCUTANEOUS APPROACH: ICD-10-PCS | Performed by: RADIOLOGY

## 2020-12-07 PROCEDURE — 02H633Z INSERTION OF INFUSION DEVICE INTO RIGHT ATRIUM, PERCUTANEOUS APPROACH: ICD-10-PCS | Performed by: RADIOLOGY

## 2020-12-07 PROCEDURE — 700101 HCHG RX REV CODE 250: Performed by: STUDENT IN AN ORGANIZED HEALTH CARE EDUCATION/TRAINING PROGRAM

## 2020-12-07 PROCEDURE — A9270 NON-COVERED ITEM OR SERVICE: HCPCS | Performed by: STUDENT IN AN ORGANIZED HEALTH CARE EDUCATION/TRAINING PROGRAM

## 2020-12-07 PROCEDURE — 700105 HCHG RX REV CODE 258

## 2020-12-07 PROCEDURE — 700111 HCHG RX REV CODE 636 W/ 250 OVERRIDE (IP)

## 2020-12-07 PROCEDURE — 700111 HCHG RX REV CODE 636 W/ 250 OVERRIDE (IP): Performed by: SURGERY

## 2020-12-07 PROCEDURE — 160028 HCHG SURGERY MINUTES - 1ST 30 MINS LEVEL 3: Performed by: SURGERY

## 2020-12-07 PROCEDURE — C2628 CATHETER, OCCLUSION: HCPCS | Performed by: SURGERY

## 2020-12-07 PROCEDURE — 160048 HCHG OR STATISTICAL LEVEL 1-5: Performed by: SURGERY

## 2020-12-07 PROCEDURE — 99232 SBSQ HOSP IP/OBS MODERATE 35: CPT | Mod: GC | Performed by: HOSPITALIST

## 2020-12-07 PROCEDURE — 160035 HCHG PACU - 1ST 60 MINS PHASE I: Performed by: SURGERY

## 2020-12-07 PROCEDURE — 84702 CHORIONIC GONADOTROPIN TEST: CPT

## 2020-12-07 PROCEDURE — 02C63ZZ EXTIRPATION OF MATTER FROM RIGHT ATRIUM, PERCUTANEOUS APPROACH: ICD-10-PCS | Performed by: RADIOLOGY

## 2020-12-07 PROCEDURE — 160009 HCHG ANES TIME/MIN: Performed by: SURGERY

## 2020-12-07 PROCEDURE — 05L70ZZ OCCLUSION OF RIGHT AXILLARY VEIN, OPEN APPROACH: ICD-10-PCS | Performed by: SURGERY

## 2020-12-07 PROCEDURE — 770020 HCHG ROOM/CARE - TELE (206)

## 2020-12-07 PROCEDURE — 160036 HCHG PACU - EA ADDL 30 MINS PHASE I: Performed by: SURGERY

## 2020-12-07 PROCEDURE — 501837 HCHG SUTURE CV: Performed by: SURGERY

## 2020-12-07 PROCEDURE — 99222 1ST HOSP IP/OBS MODERATE 55: CPT | Performed by: INTERNAL MEDICINE

## 2020-12-07 PROCEDURE — 501838 HCHG SUTURE GENERAL: Performed by: SURGERY

## 2020-12-07 PROCEDURE — 90935 HEMODIALYSIS ONE EVALUATION: CPT

## 2020-12-07 PROCEDURE — 160002 HCHG RECOVERY MINUTES (STAT): Performed by: SURGERY

## 2020-12-07 PROCEDURE — 99153 MOD SED SAME PHYS/QHP EA: CPT

## 2020-12-07 RX ORDER — ONDANSETRON 2 MG/ML
4 INJECTION INTRAMUSCULAR; INTRAVENOUS
Status: DISCONTINUED | OUTPATIENT
Start: 2020-12-07 | End: 2020-12-07 | Stop reason: HOSPADM

## 2020-12-07 RX ORDER — ONDANSETRON 2 MG/ML
4 INJECTION INTRAMUSCULAR; INTRAVENOUS PRN
Status: ACTIVE | OUTPATIENT
Start: 2020-12-07 | End: 2020-12-07

## 2020-12-07 RX ORDER — HYDRALAZINE HYDROCHLORIDE 20 MG/ML
INJECTION INTRAMUSCULAR; INTRAVENOUS
Status: DISPENSED
Start: 2020-12-07 | End: 2020-12-07

## 2020-12-07 RX ORDER — SODIUM CHLORIDE 9 MG/ML
500 INJECTION, SOLUTION INTRAVENOUS
Status: ACTIVE | OUTPATIENT
Start: 2020-12-07 | End: 2020-12-07

## 2020-12-07 RX ORDER — DEXAMETHASONE SODIUM PHOSPHATE 4 MG/ML
INJECTION, SOLUTION INTRA-ARTICULAR; INTRALESIONAL; INTRAMUSCULAR; INTRAVENOUS; SOFT TISSUE PRN
Status: DISCONTINUED | OUTPATIENT
Start: 2020-12-07 | End: 2020-12-07 | Stop reason: SURG

## 2020-12-07 RX ORDER — HYDROMORPHONE HYDROCHLORIDE 1 MG/ML
0.1 INJECTION, SOLUTION INTRAMUSCULAR; INTRAVENOUS; SUBCUTANEOUS
Status: DISCONTINUED | OUTPATIENT
Start: 2020-12-07 | End: 2020-12-07 | Stop reason: HOSPADM

## 2020-12-07 RX ORDER — CEFAZOLIN SODIUM 1 G/3ML
INJECTION, POWDER, FOR SOLUTION INTRAMUSCULAR; INTRAVENOUS PRN
Status: DISCONTINUED | OUTPATIENT
Start: 2020-12-07 | End: 2020-12-07 | Stop reason: SURG

## 2020-12-07 RX ORDER — DIPHENHYDRAMINE HYDROCHLORIDE 50 MG/ML
12.5 INJECTION INTRAMUSCULAR; INTRAVENOUS
Status: DISCONTINUED | OUTPATIENT
Start: 2020-12-07 | End: 2020-12-07 | Stop reason: HOSPADM

## 2020-12-07 RX ORDER — ONDANSETRON 2 MG/ML
INJECTION INTRAMUSCULAR; INTRAVENOUS PRN
Status: DISCONTINUED | OUTPATIENT
Start: 2020-12-07 | End: 2020-12-07 | Stop reason: SURG

## 2020-12-07 RX ORDER — HYDROMORPHONE HYDROCHLORIDE 1 MG/ML
0.4 INJECTION, SOLUTION INTRAMUSCULAR; INTRAVENOUS; SUBCUTANEOUS
Status: DISCONTINUED | OUTPATIENT
Start: 2020-12-07 | End: 2020-12-07 | Stop reason: HOSPADM

## 2020-12-07 RX ORDER — LIDOCAINE HYDROCHLORIDE AND EPINEPHRINE 10; 10 MG/ML; UG/ML
INJECTION, SOLUTION INFILTRATION; PERINEURAL
Status: COMPLETED
Start: 2020-12-07 | End: 2020-12-07

## 2020-12-07 RX ORDER — HEPARIN SODIUM (PORCINE) LOCK FLUSH IV SOLN 100 UNIT/ML 100 UNIT/ML
SOLUTION INTRAVENOUS
Status: COMPLETED
Start: 2020-12-07 | End: 2020-12-07

## 2020-12-07 RX ORDER — SODIUM CHLORIDE 9 MG/ML
INJECTION, SOLUTION INTRAVENOUS
Status: DISCONTINUED | OUTPATIENT
Start: 2020-12-07 | End: 2020-12-07 | Stop reason: SURG

## 2020-12-07 RX ORDER — MIDAZOLAM HYDROCHLORIDE 1 MG/ML
.5-2 INJECTION INTRAMUSCULAR; INTRAVENOUS PRN
Status: ACTIVE | OUTPATIENT
Start: 2020-12-07 | End: 2020-12-07

## 2020-12-07 RX ORDER — HALOPERIDOL 5 MG/ML
1 INJECTION INTRAMUSCULAR
Status: DISCONTINUED | OUTPATIENT
Start: 2020-12-07 | End: 2020-12-07 | Stop reason: HOSPADM

## 2020-12-07 RX ORDER — HYDROMORPHONE HYDROCHLORIDE 1 MG/ML
0.2 INJECTION, SOLUTION INTRAMUSCULAR; INTRAVENOUS; SUBCUTANEOUS
Status: DISCONTINUED | OUTPATIENT
Start: 2020-12-07 | End: 2020-12-07 | Stop reason: HOSPADM

## 2020-12-07 RX ORDER — ONDANSETRON 2 MG/ML
INJECTION INTRAMUSCULAR; INTRAVENOUS
Status: COMPLETED
Start: 2020-12-07 | End: 2020-12-07

## 2020-12-07 RX ORDER — CEFAZOLIN SODIUM 2 G/100ML
2 INJECTION, SOLUTION INTRAVENOUS ONCE
Status: COMPLETED | OUTPATIENT
Start: 2020-12-07 | End: 2020-12-07

## 2020-12-07 RX ORDER — HEPARIN SODIUM,PORCINE 1000/ML
VIAL (ML) INJECTION
Status: DISCONTINUED | OUTPATIENT
Start: 2020-12-07 | End: 2020-12-07 | Stop reason: HOSPADM

## 2020-12-07 RX ORDER — MIDAZOLAM HYDROCHLORIDE 1 MG/ML
INJECTION INTRAMUSCULAR; INTRAVENOUS
Status: COMPLETED
Start: 2020-12-07 | End: 2020-12-07

## 2020-12-07 RX ORDER — CEFAZOLIN SODIUM 1 G/3ML
INJECTION, POWDER, FOR SOLUTION INTRAMUSCULAR; INTRAVENOUS
Status: COMPLETED
Start: 2020-12-07 | End: 2020-12-07

## 2020-12-07 RX ORDER — HEPARIN SODIUM 1000 [USP'U]/ML
INJECTION, SOLUTION INTRAVENOUS; SUBCUTANEOUS
Status: COMPLETED
Start: 2020-12-07 | End: 2020-12-07

## 2020-12-07 RX ADMIN — PREGABALIN 50 MG: 25 CAPSULE ORAL at 06:17

## 2020-12-07 RX ADMIN — LIDOCAINE HYDROCHLORIDE AND EPINEPHRINE 20 ML: 10; 10 INJECTION, SOLUTION INFILTRATION; PERINEURAL at 18:06

## 2020-12-07 RX ADMIN — LISINOPRIL 5 MG: 5 TABLET ORAL at 20:45

## 2020-12-07 RX ADMIN — DIPHENHYDRAMINE HYDROCHLORIDE 12.5 MG: 50 INJECTION INTRAMUSCULAR; INTRAVENOUS at 14:17

## 2020-12-07 RX ADMIN — FENTANYL CITRATE 25 MCG: 50 INJECTION, SOLUTION INTRAMUSCULAR; INTRAVENOUS at 14:02

## 2020-12-07 RX ADMIN — DEXAMETHASONE SODIUM PHOSPHATE 4 MG: 4 INJECTION, SOLUTION INTRA-ARTICULAR; INTRALESIONAL; INTRAMUSCULAR; INTRAVENOUS; SOFT TISSUE at 12:00

## 2020-12-07 RX ADMIN — FENTANYL CITRATE 25 MCG: 50 INJECTION, SOLUTION INTRAMUSCULAR; INTRAVENOUS at 18:26

## 2020-12-07 RX ADMIN — PROPOFOL 200 MG: 10 INJECTION, EMULSION INTRAVENOUS at 11:30

## 2020-12-07 RX ADMIN — PREGABALIN 50 MG: 25 CAPSULE ORAL at 20:46

## 2020-12-07 RX ADMIN — FENTANYL CITRATE 25 MCG: 50 INJECTION, SOLUTION INTRAMUSCULAR; INTRAVENOUS at 14:04

## 2020-12-07 RX ADMIN — CEFAZOLIN 2 G: 330 INJECTION, POWDER, FOR SOLUTION INTRAMUSCULAR; INTRAVENOUS at 17:41

## 2020-12-07 RX ADMIN — LACOSAMIDE 100 MG: 100 TABLET, FILM COATED ORAL at 20:45

## 2020-12-07 RX ADMIN — HYDROMORPHONE HYDROCHLORIDE 0.2 MG: 1 INJECTION, SOLUTION INTRAMUSCULAR; INTRAVENOUS; SUBCUTANEOUS at 14:38

## 2020-12-07 RX ADMIN — FERROUS SULFATE TAB 325 MG (65 MG ELEMENTAL FE) 325 MG: 325 (65 FE) TAB at 06:17

## 2020-12-07 RX ADMIN — MIDAZOLAM HYDROCHLORIDE 1 MG: 1 INJECTION, SOLUTION INTRAMUSCULAR; INTRAVENOUS at 17:57

## 2020-12-07 RX ADMIN — FENTANYL CITRATE 25 MCG: 50 INJECTION, SOLUTION INTRAMUSCULAR; INTRAVENOUS at 18:42

## 2020-12-07 RX ADMIN — AMLODIPINE BESYLATE 10 MG: 10 TABLET ORAL at 20:46

## 2020-12-07 RX ADMIN — FENTANYL CITRATE 50 MCG: 50 INJECTION, SOLUTION INTRAMUSCULAR; INTRAVENOUS at 18:14

## 2020-12-07 RX ADMIN — HEPARIN SODIUM: 1000 INJECTION, SOLUTION INTRAVENOUS; SUBCUTANEOUS at 10:35

## 2020-12-07 RX ADMIN — SUGAMMADEX 50 MG: 100 INJECTION, SOLUTION INTRAVENOUS at 13:41

## 2020-12-07 RX ADMIN — CEFAZOLIN 2 G: 330 INJECTION, POWDER, FOR SOLUTION INTRAMUSCULAR; INTRAVENOUS at 11:30

## 2020-12-07 RX ADMIN — LABETALOL HYDROCHLORIDE 20 MG: 5 INJECTION, SOLUTION INTRAVENOUS at 11:48

## 2020-12-07 RX ADMIN — MIDAZOLAM HYDROCHLORIDE 1 MG: 1 INJECTION, SOLUTION INTRAMUSCULAR; INTRAVENOUS at 18:38

## 2020-12-07 RX ADMIN — ACETAMINOPHEN 650 MG: 325 TABLET, FILM COATED ORAL at 06:17

## 2020-12-07 RX ADMIN — MIDAZOLAM HYDROCHLORIDE 1 MG: 1 INJECTION, SOLUTION INTRAMUSCULAR; INTRAVENOUS at 18:08

## 2020-12-07 RX ADMIN — OXYCODONE HYDROCHLORIDE 20 MG: 10 TABLET ORAL at 00:13

## 2020-12-07 RX ADMIN — OMEPRAZOLE 20 MG: 20 CAPSULE, DELAYED RELEASE ORAL at 06:17

## 2020-12-07 RX ADMIN — HYDROMORPHONE HYDROCHLORIDE 0.2 MG: 1 INJECTION, SOLUTION INTRAMUSCULAR; INTRAVENOUS; SUBCUTANEOUS at 14:31

## 2020-12-07 RX ADMIN — ONDANSETRON 4 MG: 2 INJECTION INTRAMUSCULAR; INTRAVENOUS at 17:56

## 2020-12-07 RX ADMIN — LABETALOL HYDROCHLORIDE 10 MG: 5 INJECTION, SOLUTION INTRAVENOUS at 11:35

## 2020-12-07 RX ADMIN — MIDAZOLAM HYDROCHLORIDE 1 MG: 1 INJECTION, SOLUTION INTRAMUSCULAR; INTRAVENOUS at 18:22

## 2020-12-07 RX ADMIN — FENTANYL CITRATE 50 MCG: 50 INJECTION, SOLUTION INTRAMUSCULAR; INTRAVENOUS at 18:01

## 2020-12-07 RX ADMIN — LACOSAMIDE 100 MG: 100 TABLET, FILM COATED ORAL at 06:17

## 2020-12-07 RX ADMIN — ONDANSETRON 8 MG: 2 INJECTION INTRAMUSCULAR; INTRAVENOUS at 12:00

## 2020-12-07 RX ADMIN — HYDROXYCHLOROQUINE SULFATE 200 MG: 200 TABLET ORAL at 06:18

## 2020-12-07 RX ADMIN — Medication 1000 UNITS: at 06:17

## 2020-12-07 RX ADMIN — FENTANYL CITRATE 50 MCG: 50 INJECTION, SOLUTION INTRAMUSCULAR; INTRAVENOUS at 18:34

## 2020-12-07 RX ADMIN — ROCURONIUM BROMIDE 40 MG: 10 INJECTION, SOLUTION INTRAVENOUS at 11:30

## 2020-12-07 RX ADMIN — LABETALOL HYDROCHLORIDE 10 MG: 5 INJECTION, SOLUTION INTRAVENOUS at 11:31

## 2020-12-07 RX ADMIN — LABETALOL HYDROCHLORIDE 20 MG: 5 INJECTION, SOLUTION INTRAVENOUS at 11:44

## 2020-12-07 RX ADMIN — SODIUM CHLORIDE: 9 INJECTION, SOLUTION INTRAVENOUS at 11:30

## 2020-12-07 RX ADMIN — HEPARIN 500 UNITS: 100 SYRINGE at 18:53

## 2020-12-07 RX ADMIN — FENTANYL CITRATE 50 MCG: 50 INJECTION, SOLUTION INTRAMUSCULAR; INTRAVENOUS at 14:09

## 2020-12-07 RX ADMIN — LABETALOL HYDROCHLORIDE 20 MG: 5 INJECTION, SOLUTION INTRAVENOUS at 11:39

## 2020-12-07 RX ADMIN — HYDROMORPHONE HYDROCHLORIDE 0.2 MG: 1 INJECTION, SOLUTION INTRAMUSCULAR; INTRAVENOUS; SUBCUTANEOUS at 14:54

## 2020-12-07 ASSESSMENT — ENCOUNTER SYMPTOMS
NERVOUS/ANXIOUS: 0
SINUS PAIN: 0
WHEEZING: 0
SORE THROAT: 0
FEVER: 0
MEMORY LOSS: 0
BACK PAIN: 1
VOMITING: 0
SHORTNESS OF BREATH: 0
WEAKNESS: 1
BACK PAIN: 0
TINGLING: 0
ABDOMINAL PAIN: 0
NAUSEA: 0
FLANK PAIN: 0
WEIGHT LOSS: 0
NECK PAIN: 0
DIARRHEA: 0
HEADACHES: 0
CONSTIPATION: 0
PALPITATIONS: 0
BRUISES/BLEEDS EASILY: 0
CHILLS: 0
DEPRESSION: 0
COUGH: 0
TREMORS: 0
DIZZINESS: 0

## 2020-12-07 ASSESSMENT — PAIN DESCRIPTION - PAIN TYPE
TYPE: CHRONIC PAIN;SURGICAL PAIN
TYPE: CHRONIC PAIN

## 2020-12-07 ASSESSMENT — FIBROSIS 4 INDEX: FIB4 SCORE: 1.11

## 2020-12-07 NOTE — ANESTHESIA PREPROCEDURE EVALUATION
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[facility])   (+) AV fistula occlusion ([facility])   (+) Hypertension      GI   (+) GERD (gastroesophageal reflux disease)         (+) ESRD (end stage renal disease) on dialysis ([facility])       Physical Exam    Airway   Mallampati: II  TM distance: >3 FB  Neck ROM: full       Cardiovascular - normal exam  Rhythm: regular  Rate: normal  (-) murmur     Dental - normal exam           Pulmonary - normal exam  Breath sounds clear to auscultation     Abdominal    Neurological - normal exam                 Anesthesia Plan    ASA 4       Plan - general       Airway plan will be ETT        Induction: intravenous    Postoperative Plan: Postoperative administration of opioids is intended.    Pertinent diagnostic labs and testing reviewed    Informed Consent:    Anesthetic plan and risks discussed with patient.    Use of blood products discussed with: patient whom consented to blood products.       gerd, dialysis this am

## 2020-12-07 NOTE — PROGRESS NOTES
[facility] Progress Note    Hemodialysis treatment ordered today per Dr. Douglas x 3 hours. Treatment initiated at 0733, ended at 1035.     Patient tolerated tx; see paper flow sheet for details.     Net UF 2200 mL.     Post tx, CVC flushed with saline then locked with heparin 1000 units/mL per designated amount in each wing then clamped and capped. Aspirate heparin prior to next CVC use.    Report given to Primary RN.

## 2020-12-07 NOTE — ANESTHESIA PROCEDURE NOTES
Airway  Performed by: Irving Mercado M.D.  Authorized by: Irving Mercado M.D.     Location:  OR  Urgency:  Elective  Indications for Airway Management:  Anesthesia      Spontaneous Ventilation: absent    Sedation Level:  Deep  Preoxygenated: Yes    Final Airway Type:  Supraglottic airway  Final Supraglottic Airway:  Standard LMA    SGA Size:  3  Number of Attempts at Approach:  1

## 2020-12-07 NOTE — ANESTHESIA TIME REPORT
Anesthesia Start and Stop Event Times     Date Time Event    12/7/2020 1124 Ready for Procedure     1130 Anesthesia Start     1350 Anesthesia Stop        Responsible Staff  12/07/20    Name Role Begin End    Irving Mercado M.D. Anesth 1130 1350        Preop Diagnosis (Free Text):  Pre-op Diagnosis     thrombosed av graft        Preop Diagnosis (Codes):    Post op Diagnosis  A-V fistula (HCC)      Premium Reason  Non-Premium    Comments:

## 2020-12-07 NOTE — OP REPORT
OPERATIVE REPORT      Patient:  Debby Carrizales     Procedure Date:  12/07/20    Indication:  Thrombosed RUE AV graft    Pre-Operative Diagnosis:  ESRD   Thrombosed RUE AVG    Post-Operative Diagnosis:  Same    Procedure:  Open thrombectomy of right upper extremity AV graft  Removal of portacath, left chest wall    Surgeon:  Daniel Poole M.D.    Assistant:  None    Anesthesia:  General endotracheal    EBL:  20cc    Specimen:  None    Complications:  Retained line    Findings:  Open thrombectomy of the RUE AVG was unsuccessful in salvage. The graft is chronically thrombosed. A luke would not pass through the inflow anastomosis and no inflow was achieved. The venous outflow side was also chronically thrombosed. No acute clot was retrieved.    The subcutaneous port of the portacath was removed. The line was densely socked-in with a fibrin sheath. On dissection down to the chest wall, the line broke and was not removed in its entirety. Under fluoroscopic imaging, the line appeared to be in the subclavian vein and had not migrated. I discussed the case with Dr. Maki who will attempt to snare it in IR. We also discussed placement of a permacatheter and elected to have it placed in IR after the line is removed as it may impede removal if placed now.    Procedure:  Informed consent was obtained from the patient in the pre-operative holding area. All risks, benefits, and alternatives were explained and she elected to proceed. The patient was brought to the operating room and placed on the table in a supine position. General anesthesia was induced, vesna-operative antibiotics were given, and a time-out was performed verifying the correct site of surgery. The patient was prepped and draped in the usual sterile fashion.    An incision was made over the AV graft in the distal biceps and sharp dissection was used to free it from scar tissue. The graft was dissected out and controlled with vessel  loops proximally and distally. A transverse graftotomy was made with a scalpel. The graft was thrombosed with no bleeding.    I attempted to pass a luke retrograde but it would not pass through the inflow anastomosis and no clot was retrieved, nor inflow obtained. A graft thrombectomy catheter was passed antegrade but not traverse the length of the graft. On withdrawal, only a scant amount of chronic debris was retrieved with no backbleeding. At this point, it was apparent the access is not salvageable.    The incision was irrigated and the graft was closed with 5-0 prolene. The incision was closed with vicyrl and monocryl. An occlusive dressing was applied.    At this point, anesthesia removed the temporary HD catheter from the neck, the sterile field was broken and the patient was re-prepped and draped for portacath removal while pressure was held on the neck.    Skin over the port was anesthetized with lidocaine. An incision was made over the subcutaneous port which was sharply dissected out. There was a dense fibrin sheath around the port and line. Gentle traction was applied to the line which was densely socked-in and did not pull back. I took the dissection down to the chest wall to free the line but there was still significant resistance. With additional gentle traction, the line broke and the end retracted and could not be identified. Flouroscopy was used to identify the catheter tip position which had not migrated. I discussed the case with IR who will attempt endovascular retrieval and we will not place a permacatheter now as it may impede snaring. The chest incision was irrigated and closed in layers with vicryl and monocryl. An occlusive dressing was applied.     The patient was recovered from anesthesia and taken to PACU in stable condition.      Daniel Poole MD  Vascular Surgeon  [facility]

## 2020-12-07 NOTE — PROGRESS NOTES
Progress Note    CC: f/u for subacute RUE AVG thrombosis    Interval History: 40 y.o. female w/ Lupus, HTN, seizures, and ESRD via RUE AVG admitted w/ subacute thrombosis. Last successful HD was over a week ago. Presented 12/2 w/ thrombosis and discharged. Returned with hyperkalemia, temp cath placed.    No new complaints today.    Review of Systems  Negative for chest pain or SOB  Negative for stroke/TIA    Medications  Prior to Admission Medications   Prescriptions Last Dose Informant Patient Reported? Taking?   Cholecalciferol (VITAMIN D3) 22846 UNIT Cap 12/4/2020 at 0800 Patient Yes No   Sig: Take 10,000 Units by mouth every day.   Naloxone (NARCAN) 4 MG/0.1ML Liquid NEVER USED at NEVER USED Patient No No   Sig: Spray 4 mg in nose as needed (For severe sleepiness or difficulty breathing from possible overdose. Call 911 after administration.).   Patient not taking: Reported on 12/4/2020   Oxycodone HCl 20 MG Tab 12/4/2020 at 0900 Patient No No   Sig: Take 1 Tab by mouth 4 times a day as needed (pain) for up to 28 days.   Oxycodone HCl 20 MG Tab DUPLICATE at DUPLICATE Patient No No   Sig: Take 1 Tab by mouth 4 times a day as needed (pain) for up to 28 days.   Patient not taking: Reported on 12/4/2020   VELPHORO 500 MG Chew Tab 12/3/2020 at pm Patient No No   Sig: TAKE 2 TABLETS BY MOUTH THREE TIMES DAILY WITH EACH MEALS   amLODIPine (NORVASC) 10 MG Tab 12/3/2020 at pm Patient No No   Sig: Take 1 Tab by mouth every day.   ascorbic acid (ASCORBIC ACID) 500 MG Tab 12/4/2020 at 0800 Patient Yes No   Sig: Take 500 mg by mouth every day.   docusate sodium (COLACE) 100 MG Cap 12/4/2020 at 0800 Patient Yes No   Sig: Take 100 mg by mouth every day.   ferrous sulfate 325 (65 FE) MG tablet 12/4/2020 at 0800 Patient Yes No   Sig: Take 325 mg by mouth every day.   fluticasone (FLONASE) 50 MCG/ACT nasal spray 12/4/2020 at 0800 Patient No No   Sig: SHAKE LIQUID AND USE 1 SPRAY IN EACH NOSTRIL EVERY DAY   hydroxychloroquine  (PLAQUENIL) 200 MG Tab 12/4/2020 at 0800 Patient Yes No   Sig: Take 200 mg by mouth every morning.   lacosamide (VIMPAT) 100 MG Tab tablet 12/4/2020 at 0800 Patient Yes No   Sig: Take 100 mg by mouth 2 Times a Day.   omeprazole (PRILOSEC) 20 MG delayed-release capsule 12/4/2020 at 0800 Patient No No   Sig: Take 1 Cap by mouth every day.   pregabalin (LYRICA) 50 MG capsule 12/4/2020 at 0800 Patient No No   Sig: Take 1 Cap by mouth 2 times a day for 28 days.   pregabalin (LYRICA) 50 MG capsule DUPLICATE at DUPLICATE Patient No No   Sig: Take 1 Cap by mouth 2 times a day for 28 days.   Patient not taking: Reported on 12/4/2020   promethazine (PHENERGAN) 25 MG Tab 12/4/2020 at 0900 Patient No No   Sig: TAKE 1 TABLET BY MOUTH EVERY 8 HOURS AS NEEDED FOR NAUSEA OR VOMITING   tizanidine (ZANAFLEX) 4 MG Tab 12/3/2020 at pm Patient No No   Sig: TAKE 1 TABLET BY MOUTH EVERY 6 HOURS AS NEEDED FOR MUSCLE SPASMS   traZODone (DESYREL) 50 MG Tab 12/3/2020 at pm Patient No No   Sig: Take 1 Tab by mouth every bedtime.   valACYclovir (VALTREX) 500 MG Tab NOT TAKING at NOT TAKING Patient No No   Sig: Take 1 Tab by mouth 2 times a day as needed (cold sores).   Patient not taking: Reported on 12/4/2020      Facility-Administered Medications: None         Physical Exam  Vitals:    12/07/20 0450   BP: 127/96   Pulse: 70   Resp: 18   Temp: 36.4 °C (97.5 °F)   SpO2: 93%       General appearance: NAD, conversing appropriately  Psych: Normal affect, mood, judgement  Neuro: CN II-XII grossly intact.   Neck: full range of motion  Lungs: No inspiratory stridor or wheezing  CV: RRR  Abdomen: Soft, NT/ND  Skin: No rashes  No pulse/thrill in the RUE graft      Labs reviewed today:  Recent Labs     12/04/20  1655 12/05/20  1235   WBC 5.2 3.0*   RBC 3.07* 3.09*   HEMOGLOBIN 9.9* 9.8*   HEMATOCRIT 30.1* 29.6*   MCV 98.0* 95.8   MCH 32.2 31.7   MCHC 32.9* 33.1*   RDW 41.6 40.0   PLATELETCT 100* 103*   MPV 10.6 10.6     Recent Labs     12/04/20  2603  20  1235 20  0902   SODIUM 133* 137 137   POTASSIUM 7.3* 5.4 5.1   CHLORIDE 93* 96 98   CO2 18* 27 27   GLUCOSE 78 62* 98   BUN 87* 32* 43*   CREATININE 20.42* 10.67* 11.95*   CALCIUM 8.2* 8.6 8.7     Recent Labs     20  1655 20  1235 20  0902   ALTSGPT 7 9 9   ASTSGOT 8* 10* 9*   ALKPHOSPHAT 103* 97 98   TBILIRUBIN 0.2 0.2 0.2   GLUCOSE 78 62* 98     Recent Labs     20  1655   APTT 38.5*   INR 1.07             No results for input(s): TROPONINI in the last 72 hours.    Urinalysis:    No results found     Imaging & Data Review:  I personally reviewed all non-invasive vascular testing including images, x-rays, tracings, arterial waveforms, and duplex exams relevant to this admission. My interpretation is below:    AVF Duplex: confirms graft thrombosed along its entire course. Axillary vein patent.      Assessment/Plan & Medical Decision-MakinF w/ multiple comorbidities admitted w/ RUE AVG subacute thrombosis. Access known to be problematic for months.    Attempt salvage today w/ thrombectomy.  Nephrology requesting portacath removal as well.    Thank you for involving us in this patient's care. Please call with questions.    Daniel Poole MD  Vascular Surgeon  [facility]  Office: [phone]

## 2020-12-07 NOTE — PROGRESS NOTES
Assumed care at 0700, bedside report received from Robby FINCH. Pt is SR on the monitor. Initial assessment completed, orders reviewed, call light with reach, bed alarm on, and hourly rounding in place. POC addressed with patient, no additional questions at this time.    1530 Pt returned from IR, c/o mild pain, declining pain medication at this time. VSS, slightly hypertensive. Pt remains NPO. Preparing to return to IR at this time.     1645 Pt returned to IR to complete procedure.

## 2020-12-07 NOTE — CONSULTS
Nephrology Consultation    Date of Service  12/7/2020    Referring Physician  Rajesh Moran M.D.    Consulting Physician  Bora Dougals M.D.    Reason for Consultation  Management of ESRD on HD      History of Presenting Illness  40 y.o. female with a history of lupus nephritis, avascular necrosis of her right hip, ESRD on hemodialysis Monday Wednesday Friday who presented 12/4/2020 with vascular access issue.    The patient is seen and examined on dialysis this morning, with a goal ultrafiltration of 2 L.  The patient is confused, and unable to give me history.  She does not know why she is here, but complains of dialysis troubles.  According to outpatient dialysis nurse, she usually dialyzes at [facility] on Monday Wednesday Friday under the care of Dr. Najjar.  She last dialyzed on Monday 11/30/2020 via her right arm AV graft, but dialysis attempted on 12/3/2020 and 12/4/2020 were unsuccessful due to clotted AV access.  The patient had a temporary right IJ catheter placed, and got dialysis on 12/4/2020 for hyperkalemia with 2 L removed.  The patient says she does not urinate much.  The patient currently denies chest pain, shortness of breath, but complains of back pain.    Review of Systems  Review of Systems   Constitutional: Negative for fever.   Respiratory: Negative for shortness of breath.    Cardiovascular: Negative for chest pain.   Gastrointestinal: Negative for abdominal pain.   Musculoskeletal: Positive for back pain.   All other systems reviewed and are negative.      Past Medical History  Past Medical History:   Diagnosis Date   • Arthritis     all joints,r/t lupus   • Avascular necrosis of bones of both hips (HCC) 10/10/2016   • Clostridium difficile colitis 5/3/2011   • Dialysis patient    • ESBL (extended spectrum beta-lactamase) producing bacteria infection 8/25/2014   • Fibromyalgia    • High anion gap metabolic acidosis 4/2/2011   • Hypertension    • Lupus (HCC)    • Pneumonia    •  Psychiatric disorder     anxiety, depression   • Pyelonephritis 11/21/2017   • Renal failure    • Sepsis due to pneumonia ([facility]) 6/7/2018   • Status epilepticus ([facility]) 12/13/2018       Surgical History  Past Surgical History:   Procedure Laterality Date   • GAYATHRI N/A 8/20/2019    Procedure: ECHOCARDIOGRAM, TRANSESOPHAGEAL;  Surgeon: Marta Elaine M.D.;  Location: [facility];  Service: Cardiac   • AV FISTULA REVISION Right 8/11/2018    Procedure: AV FISTULA REVISION- Graft and Thrombectomy;  Surgeon: Shabbir Ardon M.D.;  Location: [facility];  Service: General   • AV FISTULA THROMBOLYSIS Right 8/11/2018    Procedure: AV FISTULA THROMBOLYSIS;  Surgeon: Shabbir Ardon M.D.;  Location: [facility];  Service: General   • AV FISTULA CREATION Right 12/9/2017    Procedure: AV FISTULA CREATION-ARM FOR GRAFT;  Surgeon: Lesly Jansen M.D.;  Location: [facility];  Service: General   • THROMBECTOMY  12/9/2017    Procedure: THROMBECTOMY;  Surgeon: Lesly Jansen M.D.;  Location: [facility];  Service: General   • THROMBECTOMY Right 8/21/2016    Procedure: THROMBECTOMY - right AV fistula graft with grams;  Surgeon: Shabbir Ardon M.D.;  Location: [facility];  Service:    • ANGIOPLASTY BALLOON  8/21/2016    Procedure: ANGIOPLASTY BALLOON;  Surgeon: Shabbir Ardon M.D.;  Location: [facility];  Service:    • THROMBECTOMY Right 8/20/2016    Procedure: THROMBECTOMY AV GRAFT;  Surgeon: Shabbir Ardon M.D.;  Location: [facility];  Service:    • AV FISTULA CREATION Right 7/12/2016    Procedure: AV FISTULA CREATION WITH GRAFT BRACHIAL AXILLARY;  Surgeon: Shabbir Ardon M.D.;  Location: [facility];  Service:    • CLOSED REDUCTION Right 7/5/2016    Procedure: CLOSED REDUCTION- Hip ;  Surgeon: Michael Holman M.D.;  Location: [facility];  Service:    • HIP ARTHROPLASTY TOTAL Right  2016    Procedure: HIP ARTHROPLASTY TOTAL;  Surgeon: Michael Holman M.D.;  Location: SURGERY [facility];  Service:    • AV FISTULA CREATION  2/3/2015    Performed by Shabbir Ardon M.D. at SURGERY [facility]   • AV FISTULA CREATION  2014    Performed by Shabbir Ardon M.D. at SURGERY [facility]   • AV FISTULA CREATION  2014    Performed by Shabbir Ardon M.D. at SURGERY [facility]   • CATH PLACEMENT  2014    Performed by Shabbir Ardon M.D. at SURGERY [facility]   • OTHER  2011    tracheostomy   • GASTROSCOPY-ENDO  2011    Performed by PALMIRA DAON at ENDOSCOPY [facility]   • COLONOSCOPY WITH BIOPSY  2011    Performed by ALCIRA CRUZ at ENDOSCOPY [facility]   • GASTROSCOPY-ENDO  2011    Performed by ALCIRA CRUZ at ENDOSCOPY [facility]   • GASTROSCOPY-ENDO  4/10/2011    Performed by SYED JURADO at ENDOSCOPY [facility]   • SCLEROTHERAPHY  4/10/2011    Performed by SYED JURADO at ENDOSCOPY [facility]   • OTHER ABDOMINAL SURGERY      kidney biopsy   • TRACHEOSTOMY         Family History  Family History   Problem Relation Age of Onset   • Heart Disease Mother    • Cancer Father        Social History  Social History     Socioeconomic History   • Marital status: Single     Spouse name: Not on file   • Number of children: Not on file   • Years of education: Not on file   • Highest education level: Not on file   Occupational History   • Not on file   Social Needs   • Financial resource strain: Not on file   • Food insecurity     Worry: Not on file     Inability: Not on file   • Transportation needs     Medical: Not on file     Non-medical: Not on file   Tobacco Use   • Smoking status: Former Smoker     Packs/day: 0.50     Years: 18.00     Pack years: 9.00     Types: Cigarettes     Start date:      Quit date: 2011     Years since quittin.4   • Smokeless tobacco: Never Used   •  Tobacco comment: started at 15   Substance and Sexual Activity   • Alcohol use: No     Alcohol/week: 0.0 oz     Frequency: Never     Binge frequency: Never   • Drug use: No     Types: Marijuana   • Sexual activity: Not Currently     Partners: Male   Lifestyle   • Physical activity     Days per week: Not on file     Minutes per session: Not on file   • Stress: Not on file   Relationships   • Social connections     Talks on phone: Not on file     Gets together: Not on file     Attends [employer] service: Not on file     Active member of club or organization: Not on file     Attends meetings of clubs or organizations: Not on file     Relationship status: Not on file   • Intimate partner violence     Fear of current or ex partner: Not on file     Emotionally abused: Not on file     Physically abused: Not on file     Forced sexual activity: Not on file   Other Topics Concern   •  Service No   • Blood Transfusions Yes   • Caffeine Concern No   • Occupational Exposure No   • Hobby Hazards No   • Sleep Concern Yes   • Stress Concern Yes   • Weight Concern Yes   • Special Diet Yes   • Back Care No   • Exercise Yes   • Bike Helmet No   • Seat Belt Yes   • Self-Exams Yes   Social History Narrative   • Not on file       Medications  Current Facility-Administered Medications   Medication Dose Route Frequency Provider Last Rate Last Admin   • HYDRALAZINE HCL 20 MG/ML INJ SOLN            • ondansetron (ZOFRAN) syringe/vial injection 4 mg  4 mg Intravenous Once PRN Irving Mercado M.D.       • haloperidol lactate (HALDOL) injection 1 mg  1 mg Intravenous Q15 MIN PRN Irving Mercado M.D.       • diphenhydrAMINE (BENADRYL) injection 12.5 mg  12.5 mg Intravenous Q15 MIN PRN Irving Mercado M.D.   12.5 mg at 12/07/20 1417   • fentaNYL (SUBLIMAZE) injection 25 mcg  25 mcg Intravenous Q2 MIN PRN Irving Mercado M.D.   25 mcg at 12/07/20 1404    Or   • fentaNYL (SUBLIMAZE) injection 50 mcg  50 mcg Intravenous Q2 MIN PRN Irving RAYGOZA  RAMON Mercado   50 mcg at 12/07/20 1409   • HYDROmorphone pf (DILAUDID) injection 0.1 mg  0.1 mg Intravenous Q5 MIN PRN Irving Mercado M.D.        Or   • HYDROmorphone pf (DILAUDID) injection 0.2 mg  0.2 mg Intravenous Q5 MIN PRN Irving Mercado M.D.   0.2 mg at 12/07/20 1454    Or   • HYDROmorphone pf (DILAUDID) injection 0.4 mg  0.4 mg Intravenous Q5 MIN PRN Irving Mercado M.D.       • [MAR Hold] lisinopril (PRINIVIL) tablet 5 mg  5 mg Oral Q DAY Zeenat Duran M.D.   5 mg at 12/06/20 1027   • [MAR Hold] senna-docusate (PERICOLACE or SENOKOT S) 8.6-50 MG per tablet 2 Tab  2 Tab Oral BID Zeenat Duran M.D.   1 Tab at 12/06/20 1715    And   • [MAR Hold] polyethylene glycol/lytes (MIRALAX) PACKET 1 Packet  1 Packet Oral QDAY PRN Zeenat Duran M.D.        And   • [MAR Hold] bisacodyl (DULCOLAX) suppository 10 mg  10 mg Rectal QDAY PRN Zeenat Duran M.D.       • [MAR Hold] labetalol (NORMODYNE/TRANDATE) injection 10 mg  10 mg Intravenous Q4HRS PRN Neftaly Ojeda M.D.   20 mg at 12/07/20 1148   • [MAR Hold] acetaminophen (Tylenol) tablet 650 mg  650 mg Oral Q6HRS PRN Neftaly Ojeda M.D.   650 mg at 12/07/20 0617   • [MAR Hold] promethazine (PHENERGAN) tablet 25 mg  25 mg Oral Q8HRS PRN Neftaly Ojeda M.D.   25 mg at 12/06/20 1713   • [MAR Hold] pregabalin (LYRICA) capsule 50 mg  50 mg Oral BID Neftaly Ojeda M.D.   50 mg at 12/07/20 0617   • [MAR Hold] oxyCODONE immediate-release (ROXICODONE) tablet 20 mg  20 mg Oral 4X/DAY PRN Neftaly Ojeda M.D.   20 mg at 12/07/20 0013   • [MAR Hold] omeprazole (PRILOSEC) capsule 20 mg  20 mg Oral DAILY Neftaly Ojeda M.D.   20 mg at 12/07/20 0617   • [MAR Hold] lacosamide (VIMPAT) tablet 100 mg  100 mg Oral BID Neftaly Ojeda M.D.   100 mg at 12/07/20 0617   • [MAR Hold] hydroxychloroquine (Plaquenil) tablet 200 mg  200 mg Oral QAM Neftaly Ojeda M.D.   200 mg at 12/07/20 0618   • [MAR Hold] fluticasone (FLONASE) nasal spray 50 mcg  1 Spray Nasal DAILY Neftaly Ojeda M.D.       •  [MAR Hold] ferrous sulfate tablet 325 mg  325 mg Oral DAILY Neftaly Ojeda M.D.   325 mg at 12/07/20 0617   • [MAR Hold] vitamin D (cholecalciferol) tablet 1,000 Units  1,000 Units Oral DAILY Neftaly Ojeda M.D.   1,000 Units at 12/07/20 0617   • [MAR Hold] amLODIPine (NORVASC) tablet 10 mg  10 mg Oral DAILY Neftaly Ojeda M.D.   10 mg at 12/06/20 1712       Allergies  Allergies   Allergen Reactions   • Lorazepam [Ativan] Anxiety     Patient becomes severely paranoid and agitated   • Morphine Itching     Tolerates Dilaudid   • Seasonal Runny Nose and Itching     Hay fever, sabiha brush       Physical Exam  Temp:  [36.2 °C (97.2 °F)-37.6 °C (99.6 °F)] 37.3 °C (99.1 °F)  Pulse:  [] 73  Resp:  [13-30] 17  BP: (127-176)/() 161/94  SpO2:  [90 %-100 %] 96 %    Physical Exam   Constitutional: She is oriented to person, place, and time. She appears well-developed. No distress.   HENT:   Mouth/Throat: Oropharynx is clear and moist. No oropharyngeal exudate.   Eyes: EOM are normal. No scleral icterus.   Neck: No tracheal deviation present.   Cardiovascular: Normal heart sounds.   No murmur heard.  Pulmonary/Chest: Effort normal. No stridor. She has no rales.   Abdominal: Soft. There is no abdominal tenderness.   Musculoskeletal: Normal range of motion.         General: No edema.   Neurological: She is alert and oriented to person, place, and time.   Slow to respond, notable asterixis   Skin: Skin is warm and dry.   Psychiatric: She has a normal mood and affect.   Access: Temporary right IJ Vas-Cath      Fluids  Date 12/07/20 0700 - 12/08/20 0659   Shift 3529-7927 7091-6455 3935-9746 24 Hour Total   INTAKE   I.V. 400   400   Dialysis 500   500   Shift Total 900   900   OUTPUT   Dialysis 2200   2200   Shift Total 2200   2200   Weight (kg) 83.1 83.1 83.1 83.1       Laboratory  Labs reviewed, pertinent labs below.  Recent Labs     12/04/20  1655 12/05/20  1235   WBC 5.2 3.0*   RBC 3.07* 3.09*   HEMOGLOBIN 9.9* 9.8*    HEMATOCRIT 30.1* 29.6*   MCV 98.0* 95.8   MCH 32.2 31.7   MCHC 32.9* 33.1*   RDW 41.6 40.0   PLATELETCT 100* 103*   MPV 10.6 10.6     Recent Labs     12/04/20  1655 12/05/20  1235 12/06/20  0902   SODIUM 133* 137 137   POTASSIUM 7.3* 5.4 5.1   CHLORIDE 93* 96 98   CO2 18* 27 27   GLUCOSE 78 62* 98   BUN 87* 32* 43*   CREATININE 20.42* 10.67* 11.95*   CALCIUM 8.2* 8.6 8.7     Recent Labs     12/04/20  1655   APTT 38.5*   INR 1.07            URINALYSIS:  Lab Results   Component Value Date/Time    COLORURINE Yellow 08/12/2019 1459    CLARITY Clear 08/12/2019 1459    SPECGRAVITY 1.020 08/12/2019 1459    PHURINE 7.5 08/12/2019 1459    KETONES 15 (A) 08/12/2019 1459    PROTEINURIN >=300 (A) 08/12/2019 1459    BILIRUBINUR Negative 08/12/2019 1459    UROBILU 0.2 01/01/2019 0705    NITRITE Negative 08/12/2019 1459    LEUKESTERAS Negative 08/12/2019 1459    OCCULTBLOOD Small (A) 08/12/2019 1459     UPC  Lab Results   Component Value Date/Time    TOTPROTUR 65.0 (H) 01/21/2015 1520      Lab Results   Component Value Date/Time    CREATININEU 65.60 01/21/2015 1520       Imaging reviewed  DX-PORTABLE FLUORO > 1 HOUR   Final Result      Intraoperative image as above described.      US-HEMODIALYSIS GRAFT DUPLEX COMP UPPER EXTREMITY   Final Result      DX-CHEST-LIMITED (1 VIEW)   Final Result         1.  Mild interstitial pulmonary parenchymal prominence, appearance suggests mild interstitial edema and/or infiltrates.   2.  Cardiomegaly      DX-CHEST-PORTABLE (1 VIEW)   Final Result      Again seen cardiomegaly.      IR-FOREIGN BODY RETRIEVAL- VASCULAR    (Results Pending)         Assessment/Plan  40 y.o. female with a history of lupus nephritis, avascular necrosis of her right hip, ESRD on hemodialysis Monday Wednesday Friday who presented 12/4/2020 with vascular access issue.    1.  ESRD on hemodialysis Monday Wednesday Friday.  Patient dialyzed today.  Continue dialysis on a Monday Wednesday Friday schedule.  Patient anuric.   Check labs daily.  Low-salt diet.    2.  Access: Right arm AV graft, clotted on admission.  Salvage attempt unsuccessful 12/7/2020.  Likely plans on tunneled dialysis catheter placement.  Appreciate vascular surgery and IR assistance.    3.  Encephalopathy, unclear etiology.  Continue dialysis as above.  Further work-up per primary team.    4.  Normocytic anemia, worsening.  Check CBC at least 3 times weekly.  Start Epogen thrice weekly with dialysis.    5.  Leukopenia, worsening.  Unclear etiology.  Check CBC at least 3 times weekly.    6.  Thrombocytopenia, unclear etiology.  Check CBC at least 3 times weekly.    7.  Hyperkalemia, noted on admission, improved with dialysis.  Check labs daily.  Low potassium diet.      Bora Douglas MD  Nephrology

## 2020-12-07 NOTE — ANESTHESIA POSTPROCEDURE EVALUATION
Patient: Debby Carrizales    Procedure Summary     Date: 12/07/20 Room / Location: [facility] 06 / SURGERY [facility]    Anesthesia Start: 1130 Anesthesia Stop: 1350    Procedures:       Right upper extremity arteriovenous graft thrombectomy (Right Arm Upper)      port-a-catheter removal (Left Chest) Diagnosis: (thrombosed av graft)    Surgeons: Daniel Poole M.D. Responsible Provider: Irving Mercado M.D.    Anesthesia Type: general ASA Status: 4          Final Anesthesia Type: general  Last vitals  BP   Blood Pressure: 140/74    Temp   36.3 °C (97.3 °F)    Pulse   Pulse: 81   Resp   16    SpO2   99 %      Anesthesia Post Evaluation    Patient location during evaluation: PACU  Patient participation: complete - patient participated  Level of consciousness: awake and alert    Airway patency: patent  Anesthetic complications: no  Cardiovascular status: hemodynamically stable  Respiratory status: acceptable  Hydration status: euvolemic    PONV: none           Nurse Pain Score: 0 (NPRS)

## 2020-12-07 NOTE — PROGRESS NOTES
Daily Progress Note:     Date of Service: 12/7/2020  Primary Team: [facility] Team   Attending: Rajesh Moran M.D.   Senior Resident: Dr. Elizabeth Arora  Intern: Dr. Zeenat Duran  Contact:  [phone]    Chief Complaint:   Hyperkalemia  ESRD    Subjective:   Pt did not report any symptoms today. She underwent hemodialysis. She underwent vascular attempt at graft thrombectomy today and recovered appropriately. Counseled on this procedure and its lack of success today. Pt recovering well post-op.         Consultants/Specialty:  Vascular Surgery- Dr. Poole  Nephrology- Dr. Barrientos  Interventional Radiology - Dr. Maki    Review of Systems:    Review of Systems   Constitutional: Positive for malaise/fatigue. Negative for chills, fever and weight loss.   HENT: Negative for congestion, ear discharge, ear pain, sinus pain and sore throat.    Respiratory: Negative for cough, shortness of breath and wheezing.    Cardiovascular: Negative for chest pain, palpitations and leg swelling.   Gastrointestinal: Negative for abdominal pain, constipation, diarrhea, nausea and vomiting.   Genitourinary: Negative for dysuria, flank pain, frequency, hematuria and urgency.   Musculoskeletal: Negative for back pain, joint pain and neck pain.   Skin: Negative for itching and rash.   Neurological: Positive for weakness. Negative for dizziness, tingling, tremors and headaches.   Endo/Heme/Allergies: Does not bruise/bleed easily.   Psychiatric/Behavioral: Negative for depression and memory loss. The patient is not nervous/anxious.        Objective:   Physical Exam:   Vitals:   Temp:  [36.2 °C (97.2 °F)-37.6 °C (99.6 °F)] 37.3 °C (99.1 °F)  Pulse:  [] 75  Resp:  [13-30] 20  BP: (127-176)/() 161/96  SpO2:  [90 %-100 %] 96 %    Physical Exam  Constitutional:       Appearance: Normal appearance.   HENT:      Head: Normocephalic and atraumatic.      Right Ear: External ear normal.      Left Ear: External ear normal.      Nose: Nose  normal.      Mouth/Throat:      Mouth: Mucous membranes are moist.   Eyes:      Extraocular Movements: Extraocular movements intact.      Pupils: Pupils are equal, round, and reactive to light.   Neck:      Musculoskeletal: Normal range of motion. No neck rigidity or muscular tenderness.   Cardiovascular:      Rate and Rhythm: Normal rate and regular rhythm.      Pulses: Normal pulses.      Heart sounds: Normal heart sounds.   Pulmonary:      Effort: Pulmonary effort is normal.      Breath sounds: Normal breath sounds.   Abdominal:      General: Abdomen is flat. Bowel sounds are normal. There is no distension.      Palpations: Abdomen is soft. There is no mass.      Tenderness: There is no abdominal tenderness.   Musculoskeletal: Normal range of motion.         General: No swelling.      Right lower leg: Edema present.      Left lower leg: Edema present.      Comments:   Tenderness to palpation at AV fistula site on right, port cath present on left side of chest   Skin:     General: Skin is warm and dry.      Capillary Refill: Capillary refill takes less than 2 seconds.   Neurological:      General: No focal deficit present.      Mental Status: She is alert and oriented to person, place, and time.   Psychiatric:         Mood and Affect: Mood normal.         Behavior: Behavior normal.           Labs:   Recent Labs     12/04/20 1655 12/05/20  1235   WBC 5.2 3.0*   RBC 3.07* 3.09*   HEMOGLOBIN 9.9* 9.8*   HEMATOCRIT 30.1* 29.6*   MCV 98.0* 95.8   MCH 32.2 31.7   RDW 41.6 40.0   PLATELETCT 100* 103*   MPV 10.6 10.6   NEUTSPOLYS 56.80 45.60   LYMPHOCYTES 26.60 34.70   MONOCYTES 12.20 15.70*   EOSINOPHILS 1.90 1.30   BASOPHILS 1.70 2.00*     Recent Labs     12/04/20 1655 12/05/20  1235 12/06/20  0902   SODIUM 133* 137 137   POTASSIUM 7.3* 5.4 5.1   CHLORIDE 93* 96 98   CO2 18* 27 27   GLUCOSE 78 62* 98   BUN 87* 32* 43*     Recent Labs     12/04/20 1655 12/05/20  1235 12/06/20  0902   ALBUMIN 4.0 3.7 3.9   TBILIRUBIN  0.2 0.2 0.2   ALKPHOSPHAT 103* 97 98   TOTPROTEIN 6.9 6.3 6.7   ALTSGPT 7 9 9   ASTSGOT 8* 10* 9*   CREATININE 20.42* 10.67* 11.95*       Imaging:   DX-PORTABLE FLUORO > 1 HOUR   Final Result      Intraoperative image as above described.      US-HEMODIALYSIS GRAFT DUPLEX COMP UPPER EXTREMITY   Final Result      DX-CHEST-LIMITED (1 VIEW)   Final Result         1.  Mild interstitial pulmonary parenchymal prominence, appearance suggests mild interstitial edema and/or infiltrates.   2.  Cardiomegaly      DX-CHEST-PORTABLE (1 VIEW)   Final Result      Again seen cardiomegaly.      IR-FOREIGN BODY RETRIEVAL- VASCULAR    (Results Pending)       Assessment and Plan:    Problem Representation:  Patient is a 39 yo F PMHx ESRD secondary to lupus nephritis (MWF HD, missed 1 week of appts d/t thrombosed graft) and longstanding vascular access issues (w/ port noted tip abutting tricuspid valve GAYATHRI 2019) admitted w/ hyperkalemia 7.3 and symptoms of lower extremity cramping pain. No EKG abnormalities, was treated w/ IV calcium gluconate, insulin, and dextrose and emergent HD 12/5/2020 w/ subsequent resolution of hyperkalemia K 5.4.  RUE US w/ doppler demonstrated right AV graft thrombosed from arterial to venous anastomosis. Vascular to attempt thrombectomy versus permacath placement 12/7/2020. Also will remove port as increased risk for arrhythmias w/ current position abutting tricuspid valve; also limited utility in pt not requirement very frequent labs. Nephro will dictate HD needs, for now will continue w/ MWF HD while pt is inpatient. Adding on lisinopril 5mg for better BP control. On day of dialysis, give lisinopril after dialysis to avoid it being dialyzed out.     12/7-->Pt underwent open thrombectomy attempt of RUE AVG. This attemp failed as it was chronically thrombosed. Attempted removal of port resulted the line breaking and stable w/o migration in subclavian vein. Dr. Maki, IR, will attempt removal of this and then  subsequently IR guided permacath placement. Will need to confirm the expected timing of this procedure        * AV fistula occlusion (HCC)- (present on admission)  Assessment & Plan  - right brachial shunt was clotted off per patient, missed dialysis for 1 week  - Continue telemetry monitoring; so far NSR  - RUE US demonstrating total occlusion of AV graft  -port-a-cath noted in 2019 to abut tricuspid valve  -attempted thrombectomy failed 12/7  -d/t intraop complication, distal portion of port will need removed per IR-->subsequent permacath placement  -f/u nephro plans for permanent dialysis access solution; typically permacaths are used for 6mo-1yr as bridging to more long-term access  -no sticks or BP measurements on right extremity      Hyperkalemia- (present on admission)  Assessment & Plan  -Potassium 7.3 on admission.  No peaked T waves seen on EKG in ER.  -Like secondary to missed dialysis and ESRD secondary to SLE  -IV insulin, dextrose, calcium gluconate, patiromer ordered in ER by ED providr  -Dr. Pérez intensivist placed a trialysis HD catheter in the ER; pt s/p emergent HD on 12/5  -K recovered to 5.4 12/5  -no further need for daily BMPs    Hypertension- (present on admission)  Assessment & Plan  - BP at highest 184/79 in ER, clonidine given in ER  -Likely secondary to lupus diagnosis and consequent ESRD  -Continue home amlodipine  -add on 5mg lisinopril for optimal BP control   -prn IV labetalol ordered BP>180/110    ESRD (end stage renal disease) on dialysis (HCC)- (present on admission)  Assessment & Plan  -On dialysis MWF, reports has been on dialysis for 4 years  -on velphoro phos binder not in formulary, per pt family to bring this to continue  -complicated by hyperkalemia secondary to AVG occlusion    Lupus (systemic lupus erythematosus) (HCC)- (present on admission)  Assessment & Plan  -dx age 23, frequent loss to f/u  -symptoms include chronic joint pain and hip pain  -TTE 8/2019-->LVEF  65%  -complicated by ESRD on MWF iHD  -continue hydroxychloroquine      GERD (gastroesophageal reflux disease)- (present on admission)  Assessment & Plan  -Continue omeprazole    Seizure disorder (HCC)- (present on admission)  Assessment & Plan  -History of seizures since 2016 per neurology, last noted seizure found on records 10/2019, had missed meds.  -Per neurology records, last video EEG 1/02/19, noted:  1. Diffuse nonspecific cortical dysfunction - moderate  2. Focal cortical dysfunction / irritability over frontal (R>L)  - Continue home lacosamide    Anemia in chronic kidney disease, on chronic dialysis (HCC)- (present on admission)  Assessment & Plan  -Hemoglobin 9.9 on admission, chronically anemic  -Anemia likely secondary to chronic kidney disease and lupus  - CTM    Low back pain- (present on admission)  Assessment & Plan  - history of diffuse joint and body pain, low back, hips and legs, follows with physiatry  -Status post right total hip arthroplasty 1/2016  -Secondary to SLE w/ joint manifestations  -Continue home oxycodone dose as documented in previous clinic note 9/2020  -continue pregabalin pt w/ dx of fibromyalgia    DISPO- Pending IR guided removal of distal portion of portacath and permcath placement

## 2020-12-08 ENCOUNTER — PATIENT OUTREACH (OUTPATIENT)
Dept: HEALTH INFORMATION MANAGEMENT | Facility: OTHER | Age: 40
End: 2020-12-08

## 2020-12-08 VITALS
SYSTOLIC BLOOD PRESSURE: 160 MMHG | WEIGHT: 177.47 LBS | RESPIRATION RATE: 16 BRPM | HEART RATE: 77 BPM | HEIGHT: 65 IN | DIASTOLIC BLOOD PRESSURE: 92 MMHG | TEMPERATURE: 98.9 F | BODY MASS INDEX: 29.57 KG/M2 | OXYGEN SATURATION: 95 %

## 2020-12-08 PROBLEM — T82.898A AV FISTULA OCCLUSION (HCC): Status: RESOLVED | Noted: 2018-08-10 | Resolved: 2020-12-08

## 2020-12-08 PROCEDURE — A9270 NON-COVERED ITEM OR SERVICE: HCPCS | Performed by: STUDENT IN AN ORGANIZED HEALTH CARE EDUCATION/TRAINING PROGRAM

## 2020-12-08 PROCEDURE — 99238 HOSP IP/OBS DSCHRG MGMT 30/<: CPT | Mod: GC | Performed by: HOSPITALIST

## 2020-12-08 PROCEDURE — 700102 HCHG RX REV CODE 250 W/ 637 OVERRIDE(OP): Performed by: STUDENT IN AN ORGANIZED HEALTH CARE EDUCATION/TRAINING PROGRAM

## 2020-12-08 RX ORDER — LISINOPRIL 10 MG/1
10 TABLET ORAL DAILY
Qty: 30 TAB | Refills: 2 | Status: SHIPPED | OUTPATIENT
Start: 2020-12-08 | End: 2021-05-12 | Stop reason: SDUPTHER

## 2020-12-08 RX ORDER — NALOXONE HYDROCHLORIDE 4 MG/.1ML
1 SPRAY NASAL
Qty: 2 EACH | Refills: 0 | Status: ON HOLD | OUTPATIENT
Start: 2020-12-08 | End: 2021-12-01

## 2020-12-08 RX ORDER — LISINOPRIL 10 MG/1
10 TABLET ORAL
Status: DISCONTINUED | OUTPATIENT
Start: 2020-12-08 | End: 2020-12-08 | Stop reason: HOSPADM

## 2020-12-08 RX ADMIN — HYDROXYCHLOROQUINE SULFATE 200 MG: 200 TABLET ORAL at 04:35

## 2020-12-08 RX ADMIN — DOCUSATE SODIUM 50 MG AND SENNOSIDES 8.6 MG 2 TABLET: 8.6; 5 TABLET, FILM COATED ORAL at 04:34

## 2020-12-08 RX ADMIN — OMEPRAZOLE 20 MG: 20 CAPSULE, DELAYED RELEASE ORAL at 04:35

## 2020-12-08 RX ADMIN — LACOSAMIDE 100 MG: 100 TABLET, FILM COATED ORAL at 04:35

## 2020-12-08 RX ADMIN — PREGABALIN 50 MG: 25 CAPSULE ORAL at 04:34

## 2020-12-08 RX ADMIN — Medication 1000 UNITS: at 04:35

## 2020-12-08 RX ADMIN — FLUTICASONE PROPIONATE 50 MCG: 50 SPRAY, METERED NASAL at 04:35

## 2020-12-08 RX ADMIN — FERROUS SULFATE TAB 325 MG (65 MG ELEMENTAL FE) 325 MG: 325 (65 FE) TAB at 04:35

## 2020-12-08 RX ADMIN — OXYCODONE HYDROCHLORIDE 20 MG: 10 TABLET ORAL at 04:40

## 2020-12-08 RX ADMIN — ACETAMINOPHEN 650 MG: 325 TABLET, FILM COATED ORAL at 00:22

## 2020-12-08 NOTE — PROGRESS NOTES
Assumed care at 0700, bedside report received from Victorina FINCH. Pt is SR on the monitor. Initial assessment completed, orders reviewed, call light with reach, bed alarm on, and hourly rounding in place. POC addressed with patient, no additional questions at this time.  Perm cath site clean, dry, and intact.

## 2020-12-08 NOTE — DISCHARGE SUMMARY
Discharge Summary    Date of Admission: 12/4/2020  Date of Discharge: 12/8/2020  Discharging Attending: Rajesh Moran M.D.   Discharging Senior Resident: Dr. Arora  Discharging Intern: Dr. Duran    CHIEF COMPLAINT ON ADMISSION  Chief Complaint   Patient presents with   • Vascular Access Problem       Reason for Admission  Hyperkalemia and vascular access issue.    Admission Date  12/4/2020    CODE STATUS  Full Code    HPI & HOSPITAL COURSE  60-year-old female with past medical history of ESRD secondary to lupus nephritis (on MWF dialysis) and longstanding vascular access issues(with port abutting the tricuspid valve on GAYATHRI in 2019), hypertension, avascular necrosis of bilateral hip s/p replacement of the right hip, fibromyalgia presented with complaints of issues with vascular access for dialysis.    Right upper extremity AV fistula occlusion:   Ultrasound was done of the right upper extremity AVF, which revealed thrombosis.  Hence vascular surgery was consulted.  Patient underwent an unsuccessful thrombectomy on 12/7.  Port-A-Cath was removed, and IR guided right IJ tunneled hemodialysis catheter was placed.  Vascular surgery recommended outpatient follow-up with them for further vein mapping for future permanent access.    ESRD secondary to lupus nephritis:  Nephrology was consulted, patient was continued on MWF dialysis schedule.  Epogen was added during the dialysis.  PermCath placed, she will be discharged to continue outpatient dialysis and nephrology follow-up.    Hyperkalemia:  Patient was noted to have a potassium of 7.3 in the setting of missed dialysis due to thrombosed graft, with no EKG abnormalities.  Treated with insulin and dextrose, also given IV calcium.  Underwent emergent hemodialysis, following which potassium stabilized.    Hypertension:  Patient was noted to have elevated blood pressures on presentation in the setting of end-stage renal disease.  Home amlodipine was continued.  Also 5 mg  lisinopril was added for optimal blood pressure control.    Systemic lupus erythematosus and fibromyalgia: Hydroxychloroquine was continued throughout hospitalization. Also home oxycodone and pregabalin were continued.    Patient is able to ambulate without any difficulty.         Therefore, she is discharged in fair and stable condition to home with close outpatient follow-up.    The patient met 2-midnight criteria for an inpatient stay at the time of discharge.    PHYSICAL EXAM ON DISCHARGE  Temp:  [36.3 °C (97.3 °F)-37.6 °C (99.6 °F)] 37.2 °C (98.9 °F)  Pulse:  [71-86] 77  Resp:  [11-30] 16  BP: (143-186)/() 160/92  SpO2:  [90 %-100 %] 95 %    Physical Exam  Constitutional:       Appearance: Normal appearance.   HENT:      Head: Normocephalic and atraumatic.      Mouth/Throat:      Mouth: Mucous membranes are moist.      Pharynx: No oropharyngeal exudate.   Eyes:      Extraocular Movements: Extraocular movements intact.   Cardiovascular:      Rate and Rhythm: Normal rate and regular rhythm.      Heart sounds: No murmur.   Pulmonary:      Effort: No respiratory distress.      Breath sounds: Normal breath sounds.   Abdominal:      General: Bowel sounds are normal. There is no distension.      Palpations: Abdomen is soft.   Musculoskeletal:      Right lower leg: Edema present.      Left lower leg: Edema present.   Skin:     General: Skin is warm and dry.      Capillary Refill: Capillary refill takes less than 2 seconds.   Neurological:      General: No focal deficit present.      Mental Status: She is alert and oriented to person, place, and time.      Cranial Nerves: No cranial nerve deficit.   Psychiatric:         Mood and Affect: Mood normal.         Judgment: Judgment normal.         Discharge Date  12/8/2020    FOLLOW UP ITEMS POST DISCHARGE  -Follow-up with vascular surgery in 2 weeks for evaluation regards to future vascular access  -Follow-up with nephrology, continue Monday Wednesday Friday dialysis  as scheduled        FOLLOW UP  Future Appointments   Date Time Provider Department Center   1/8/2021 10:40 AM Quinn Chandler M.D. [facility] None     Fadi Najjar, M.D.  [address]  [address]  [place] [id number] [phone]      Your nephrologist will visit you during dialysis. Call office if you need to schedule an apt or if you have questions/concerns. Thank you.    Daniel Poole M.D.  [address] [place] [id number] [phone]      [facility]  called office of [facility] and left a voicemail requesting office to contact you for your appointment. If you do not hear from them please call to schedule. Thank you       MEDICATIONS ON DISCHARGE     Medication List      START taking these medications      Instructions   lisinopril 10 MG Tabs  Commonly known as: PRINIVIL   Take 1 Tab by mouth every day. On day of dialysis, take this medication after dialysis. This medication is for high blood pressure  Dose: 10 mg        CHANGE how you take these medications      Instructions   Narcan 4 MG/0.1ML Liqd  What changed:   · when to take this  · reasons to take this  Generic drug: Naloxone   Doctor's comments: Chronic opioid use  Administer 4 mg into affected nostril(S) one time as needed (for severe sleepiness or difficulty breathing due to opioid overdose) for up to 1 dose.  Dose: 1 Spray     Oxycodone HCl 20 MG Tabs  What changed: Another medication with the same name was removed. Continue taking this medication, and follow the directions you see here.   Take 1 Tab by mouth 4 times a day as needed (pain) for up to 28 days.  Dose: 1 Tab     pregabalin 50 MG capsule  What changed: Another medication with the same name was removed. Continue taking this medication, and follow the directions you see here.  Commonly known as: LYRICA   Take 1 Cap by mouth 2 times a day for 28 days.  Dose: 50 mg        CONTINUE taking these medications      Instructions   amLODIPine 10 MG Tabs  Commonly known as:  NORVASC   Take 1 Tab by mouth every day.  Dose: 10 mg     docusate sodium 100 MG Caps  Commonly known as: COLACE   Take 100 mg by mouth every day.  Dose: 100 mg     ferrous sulfate 325 (65 Fe) MG tablet   Take 325 mg by mouth every day.  Dose: 325 mg     fluticasone 50 MCG/ACT nasal spray  Commonly known as: FLONASE   Doctor's comments: **Patient requests 90 days supply**  SHAKE LIQUID AND USE 1 SPRAY IN EACH NOSTRIL EVERY DAY     hydroxychloroquine 200 MG Tabs  Commonly known as: Plaquenil   Take 200 mg by mouth every morning.  Dose: 200 mg     omeprazole 20 MG delayed-release capsule  Commonly known as: PRILOSEC   Take 1 Cap by mouth every day.  Dose: 20 mg     promethazine 25 MG Tabs  Commonly known as: PHENERGAN   TAKE 1 TABLET BY MOUTH EVERY 8 HOURS AS NEEDED FOR NAUSEA OR VOMITING     Velphoro 500 MG Chew  Generic drug: Sucroferric Oxyhydroxide   TAKE 2 TABLETS BY MOUTH THREE TIMES DAILY WITH EACH MEALS     Vimpat 100 MG Tabs tablet  Generic drug: lacosamide   Take 100 mg by mouth 2 Times a Day.  Dose: 100 mg     Vitamin D3 13463 UNIT Caps   Take 10,000 Units by mouth every day.  Dose: 10,000 Units        STOP taking these medications    ascorbic acid 500 MG Tabs  Commonly known as: ascorbic acid     tizanidine 4 MG Tabs  Commonly known as: ZANAFLEX     traZODone 50 MG Tabs  Commonly known as: DESYREL     valACYclovir 500 MG Tabs  Commonly known as: VALTREX            Allergies  Allergies   Allergen Reactions   • Lorazepam [Ativan] Anxiety     Patient becomes severely paranoid and agitated   • Morphine Itching     Tolerates Dilaudid   • Seasonal Runny Nose and Itching     Hay fever, sabiha brush       DIET  Orders Placed This Encounter   Procedures   • Diet Order Diet: Renal     Standing Status:   Standing     Number of Occurrences:   1     Order Specific Question:   Diet:     Answer:   Renal [8]       ACTIVITY  As tolerated.      CONSULTATIONS  -Vascular surgery Dr. Poole  -Dr. Barrientos [facility]    PROCEDURES  IR guided right IJ PermCath placement  Unsuccessful thrombectomy attempt

## 2020-12-08 NOTE — PROGRESS NOTES
"Spoke to pharmacist, Nabil, regarding \"lorazepam allergy\". He states ok to use Versed in doses for procedural sedation.  "

## 2020-12-08 NOTE — CARE PLAN
Problem: Communication  Goal: The ability to communicate needs accurately and effectively will improve  Outcome: PROGRESSING AS EXPECTED  Intervention: [place] patient and significant other/support system to call light to alert staff of needs  Note: Pt educated on POC and medications. Pt verbalized understanding.      Problem: Safety  Goal: Will remain free from injury  Outcome: PROGRESSING AS EXPECTED  Intervention: Provide assistance with mobility  Note: Pt bedside table and call-light are within reach, bed in lowest position and locked. Treaded socks on and pt has been educated on call light use and asked to call before getting up.

## 2020-12-08 NOTE — PROGRESS NOTES
"    Progress Note    CC: f/u for subacute RUE AVG thrombosis    Interval History: 40 y.o. female w/ Lupus, HTN, seizures, and ESRD via RUE AVG admitted w/ subacute thrombosis. Last successful HD was over a week ago. Presented 12/2 w/ thrombosis and discharged.     Had attempted thrombectomy of AVG yesterday, but this was unsuccessful. Temp cath exchanged for permacath.     HD access history - had 2 other attempts at AVF in the right forearm, then AVG in the upper right arm. No access has been attemped in the left arm 2/2 prior \"cancer treatment access\" per patient. Likely a port on the left.     Today, she has soreness in the right chest where the CVC is placed. No other complaints.    Review of Systems  Negative for chest pain or SOB  Negative for stroke/TIA    Medications  Prior to Admission Medications   Prescriptions Last Dose Informant Patient Reported? Taking?   Cholecalciferol (VITAMIN D3) 68291 UNIT Cap 12/4/2020 at 0800 Patient Yes No   Sig: Take 10,000 Units by mouth every day.   Naloxone (NARCAN) 4 MG/0.1ML Liquid NEVER USED at NEVER USED Patient No No   Sig: Spray 4 mg in nose as needed (For severe sleepiness or difficulty breathing from possible overdose. Call 911 after administration.).   Patient not taking: Reported on 12/4/2020   Oxycodone HCl 20 MG Tab 12/4/2020 at 0900 Patient No No   Sig: Take 1 Tab by mouth 4 times a day as needed (pain) for up to 28 days.   Oxycodone HCl 20 MG Tab DUPLICATE at DUPLICATE Patient No No   Sig: Take 1 Tab by mouth 4 times a day as needed (pain) for up to 28 days.   Patient not taking: Reported on 12/4/2020   VELPHORO 500 MG Chew Tab 12/3/2020 at pm Patient No No   Sig: TAKE 2 TABLETS BY MOUTH THREE TIMES DAILY WITH EACH MEALS   amLODIPine (NORVASC) 10 MG Tab 12/3/2020 at pm Patient No No   Sig: Take 1 Tab by mouth every day.   ascorbic acid (ASCORBIC ACID) 500 MG Tab 12/4/2020 at 0800 Patient Yes No   Sig: Take 500 mg by mouth every day.   docusate sodium (COLACE) " 100 MG Cap 12/4/2020 at 0800 Patient Yes No   Sig: Take 100 mg by mouth every day.   ferrous sulfate 325 (65 FE) MG tablet 12/4/2020 at 0800 Patient Yes No   Sig: Take 325 mg by mouth every day.   fluticasone (FLONASE) 50 MCG/ACT nasal spray 12/4/2020 at 0800 Patient No No   Sig: SHAKE LIQUID AND USE 1 SPRAY IN EACH NOSTRIL EVERY DAY   hydroxychloroquine (PLAQUENIL) 200 MG Tab 12/4/2020 at 0800 Patient Yes No   Sig: Take 200 mg by mouth every morning.   lacosamide (VIMPAT) 100 MG Tab tablet 12/4/2020 at 0800 Patient Yes No   Sig: Take 100 mg by mouth 2 Times a Day.   omeprazole (PRILOSEC) 20 MG delayed-release capsule 12/4/2020 at 0800 Patient No No   Sig: Take 1 Cap by mouth every day.   pregabalin (LYRICA) 50 MG capsule 12/4/2020 at 0800 Patient No No   Sig: Take 1 Cap by mouth 2 times a day for 28 days.   pregabalin (LYRICA) 50 MG capsule DUPLICATE at DUPLICATE Patient No No   Sig: Take 1 Cap by mouth 2 times a day for 28 days.   Patient not taking: Reported on 12/4/2020   promethazine (PHENERGAN) 25 MG Tab 12/4/2020 at 0900 Patient No No   Sig: TAKE 1 TABLET BY MOUTH EVERY 8 HOURS AS NEEDED FOR NAUSEA OR VOMITING   tizanidine (ZANAFLEX) 4 MG Tab 12/3/2020 at pm Patient No No   Sig: TAKE 1 TABLET BY MOUTH EVERY 6 HOURS AS NEEDED FOR MUSCLE SPASMS   traZODone (DESYREL) 50 MG Tab 12/3/2020 at pm Patient No No   Sig: Take 1 Tab by mouth every bedtime.   valACYclovir (VALTREX) 500 MG Tab NOT TAKING at NOT TAKING Patient No No   Sig: Take 1 Tab by mouth 2 times a day as needed (cold sores).   Patient not taking: Reported on 12/4/2020      Facility-Administered Medications: None         Physical Exam  Vitals:    12/08/20 0420   BP: (!) 168/99   Pulse: 75   Resp: 16   Temp: 36.5 °C (97.7 °F)   SpO2: 97%       General appearance: NAD, conversing appropriately  Psych: Normal affect, mood, judgement  Neuro: CN II-XII grossly intact.   Neck: full range of motion  Lungs: No inspiratory stridor or wheezing  CV: RRR  Abdomen:  Soft, NT/ND  Skin: No rashes  No pulse/thrill in the RUE graft      Labs reviewed today:  Recent Labs     20  1235   WBC 3.0*   RBC 3.09*   HEMOGLOBIN 9.8*   HEMATOCRIT 29.6*   MCV 95.8   MCH 31.7   MCHC 33.1*   RDW 40.0   PLATELETCT 103*   MPV 10.6     Recent Labs     20  1235 20  0902   SODIUM 137 137   POTASSIUM 5.4 5.1   CHLORIDE 96 98   CO2 27 27   GLUCOSE 62* 98   BUN 32* 43*   CREATININE 10.67* 11.95*   CALCIUM 8.6 8.7     Recent Labs     20  1235 20  0902   ALTSGPT 9 9   ASTSGOT 10* 9*   ALKPHOSPHAT 97 98   TBILIRUBIN 0.2 0.2   GLUCOSE 62* 98                 No results for input(s): TROPONINI in the last 72 hours.    Urinalysis:    No results found     Imaging & Data Review:    No new imaging        Assessment/Plan & Medical Decision-MakinF w/ multiple comorbidities admitted w/ RUE AVG subacute thrombosis. Access known to be problematic for months.    Attempted to schedule thrombectomy unsuccessful. Temp cath replaced by permacath. Will need vein mapping for future permanent access plan, which can be done as outpatient. Would be helpful to look at the central veins as well.     No further vascular work up. Will sign off.    Will have her follow up as outpatient in 2 weeks.       Thank you for involving us in this patient's care. Please call with questions.    Charisse Barcenas PA-C  Vascular Surgery  [facility]  Office: [phone]

## 2020-12-08 NOTE — PROGRESS NOTES
Patient here for left chest foreign body retrieval (retained fragmented central catheter) and tunneled dialysis catheter placement. Procedure BARs explained to patient by physician and consents obtained. Patient to IR1 and assisted to table.  Left chest central catheter fragment removed and right chest tunneled dialysis catheter placment performed by Dr Simpson via right IJ access.  Patient was monitored and assessed continuously throughout procedure; ETCO2 31-35 with consistent waveform. Both procedures completed without complication. Right ij puncture and right chest catheter exit sites CDI; sterile guaze/dressing applied. Patient tolerated procedure quite well, was slightly drowsy but appropriately responsive afterward. Patient returned to her room  in good condition.     Bard Hemosplit longterm dialysis catheter #14.5F x 23 cm ref [id number] lot [id number]

## 2020-12-08 NOTE — PROGRESS NOTES
Pt d/c'd home. Peripheral IV d/c'd. Perm cath, clean, dry, and intact. Pt educated on home care of catheter. Agreeable to all d/c instruction and education.

## 2020-12-08 NOTE — PROGRESS NOTES
Report received from day shift RN. Pt is in bed in lowest locked position with bed side table and call light within reach. Assessment performed. No further needs stated at this time. Pt is A&Ox4. RA. Post op vitals in place. CHG bath given. Hourly rounding in place.

## 2020-12-08 NOTE — OR SURGEON
Immediate Post- Operative Note    PostOp Diagnosis: RENAL FAILURE. CATHETER FOREIGN BODY FRAGMENT      Procedure(s):     1) RETRIEVAL OF FOREIGN BODY CATHETER FRAGMENT FROM LEFT [id number].     2) RIGHT INTERNAL JUGULAR 14.5 Indonesian HEMOSPLIT TUNNELED HEMODIALYSIS CATHETER PLACEMENT WITH U/S AND FLUOROSCOPIC GUIDANCE      Estimated Blood Loss: <20CC (FLUSHES)        Complications: NONE          12/7/2020     7:27 PM     Mohsen Simpson M.D.

## 2020-12-08 NOTE — DISCHARGE INSTRUCTIONS
-Follow up at your regularly scheduled Monday, Wednesday, Friday dialysis  -Follow up with Dr. Najjar, for post-hospital follow up given new type of access; vascular surgery also would like to see you outpatient in 2 weeks  -Follow up with Dr. Drake for management of your lupus arthritis  -start taking lisinopril in addition to amlodipine for blood pressure management. On day of dialysis, take lisinopril after you get dialysis.     Discharge Instructions    Discharged to home by car with relative. Discharged via wheelchair, hospital escort: Yes.  Special equipment needed: Not Applicable    Be sure to schedule a follow-up appointment with your primary care doctor or any specialists as instructed.     Discharge Plan:   Diet Plan: Discussed  Activity Level: Discussed  Confirmed Follow up Appointment: Appointment Scheduled  Confirmed Symptoms Management: Discussed  Medication Reconciliation Updated: Yes  Influenza Vaccine Indication: Not indicated: Previously immunized this influenza season and > 8 years of age    I understand that a diet low in cholesterol, fat, and sodium is recommended for good health. Unless I have been given specific instructions below for another diet, I accept this instruction as my diet prescription.       Special Instructions: None    · Is patient discharged on Warfarin / Coumadin?   No     Depression / Suicide Risk    As you are discharged from this [facility] facility, it is important to learn how to keep safe from harming yourself.    Recognize the warning signs:  · Abrupt changes in personality, positive or negative- including increase in energy   · Giving away possessions  · Change in eating patterns- significant weight changes-  positive or negative  · Change in sleeping patterns- unable to sleep or sleeping all the time   · Unwillingness or inability to communicate  · Depression  · Unusual sadness, discouragement and loneliness  · Talk of wanting to die  · Neglect of personal  appearance   · Rebelliousness- reckless behavior  · Withdrawal from people/activities they love  · Confusion- inability to concentrate     If you or a loved one observes any of these behaviors or has concerns about self-harm, here's what you can do:  · Talk about it- your feelings and reasons for harming yourself  · Remove any means that you might use to hurt yourself (examples: pills, rope, extension cords, firearm)  · Get professional help from the community (Mental Health, Substance Abuse, psychological counseling)  · Do not be alone:Call your Safe Contact- someone whom you trust who will be there for you.  · Call your local CRISIS HOTLINE 966-4802 or 461-825-1960  · Call your local Children's Mobile Crisis Response Team [place] (227) 354-7130 or www.[employer]  · Call the toll free National Suicide Prevention Hotlines   · National Suicide Prevention Lifeline 519-363-XEUO (9557)  · [employer] Line Network 800-SUICIDE (739-0295)      Vascular Access for Hemodialysis              A vascular access is a connection to the blood inside your blood vessels that allows blood to be easily removed from your body and returned to your body during kidney dialysis (hemodialysis). Hemodialysis is a procedure in which a machine outside of the body filters the blood of a person whose kidneys are no longer working properly. There are three types of vascular accesses:  · Arteriovenous fistula (AVF). This is a connection between an artery and a vein (usually in the arm) that is made by sewing them together. Blood in the artery flows directly into the vein, causing it to get larger over time. This makes it easier for the vein to be used for hemodialysis. An arteriovenous fistula takes 1-6 months to develop after surgery.  · Arteriovenous graft (AVG). This is a connection between an artery and a vein in the arm that is made with a tube. An arteriovenous graft can be used within 2-3 weeks of surgery.  · Venous catheter.  This is a thin, flexible tube that is placed in a large vein (usually in the neck, chest, or groin). A venous catheter for hemodialysis contains two tubes that come out of the skin. A venous catheter can be used right away. It is usually used as a temporary access if you need hemodialysis before a fistula or graft has developed, or if kidney failure is sudden (acute) and likely to improve without the need for long-term dialysis. It may also be used as a permanent access if a fistula or graft cannot be created.  Which type of access is best for me?  The type of access that is best for you depends on the size and strength of your veins, your age, and any other health problems that you have, such as diabetes. An ultrasound test may be used to look at your veins to help make this decision.  A fistula is usually the preferred type of access. It can last several years and is less likely than the other types of accesses to become infected or to cause a blood clot within a blood vessel (thrombosis). However, a fistula is not an option for everyone. If your veins are not the right size or if the fistula does not develop properly, a graft may be used instead. Grafts require you to have strong veins. If your veins are not strong enough for a graft, a catheter may be used. Catheters are more likely than fistulas and grafts to become infected or to have a thrombosis.  Sometimes, only one type of access is an option. Your health care provider will help you determine which type of access is best for you.  How is a vascular access used?  The way that the access is used depends on the type of access:  · If the access is a fistula or graft, two needles are inserted through the skin into the access before each hemodialysis session. Blood leaves the body through one of the needles and travels through a tube to the hemodialysis machine (dialyzer). Then it flows through another tube and returns to the body through the second  "needle.  · If the access is a catheter, one tube is connected directly to the tube that leads to the dialyzer, and the other tube is connected to a tube that leads away from the dialyzer. Blood leaves the body through one tube and returns to the body through the other.  What problems can occur with vascular access?  · A blood clot within a blood vessel (thrombosis). Thrombosis can lead to a narrowing of a blood vessel (stenosis). If thrombosis occurs frequently, another access site may be created as a backup.  · Infection.  · Heart enlargement (cardiomegaly) and heart failure. Changes in blood flow may cause an increase in blood pressure or heart rate, making your heart work harder to pump blood.  These problems are most likely to occur with a venous catheter and least likely to occur with an arteriovenous fistula.  How do I care for my vascular access?    Wear a medical alert bracelet. In case of an emergency, this bracelet tells health care providers that you are a dialysis patient and allows them to care for your veins appropriately.  If you have a graft or fistula:  · A \"bruit\" is a noise that is heard with a stethoscope, and a \"thrill\" is a vibration that is felt over the graft or fistula. The presence of the bruit and thrill indicates that the access is working. You will be taught to feel for the thrill each day. If this is not felt, the access may be clotted. Call your health care provider.  · Keep your arm straight and raised (elevated) above your heart while the access site is healing.  · You may freely use the arm where your vascular access is located after the site heals. Keep the following in mind:  ? Avoid pressure on the arm.  ? Avoid lifting heavy objects with the arm.  ? Avoid sleeping on the arm.  ? Avoid wearing tight-sleeved shirts or jewelry around the graft or fistula.  · Do not allow blood pressure monitoring or needle punctures on the side where the graft or fistula is located.  · With " permission from your health care provider, you may do exercises to help with blood flow through a fistula. These exercises involve squeezing a rubber ball or other soft objects as instructed.  · Wash your access site according to directions from your health care provider.  If you have a venous catheter:  · Keep the insertion site clean and dry at all times.  · If you are told you can shower after the site heals, use a protective covering over the catheter to keep it dry.  · Follow directions from your health care provider for bandage (dressing) changes.  · Ask your health care provider what activities are safe for you. You may be restricted from lifting or making repetitive arm movements on the side with the catheter.  Contact a health care provider if:  · Swelling around the graft or fistula gets worse.  · You develop new pain.  · Your catheter gets damaged.  Get help right away if:  · You have pain, numbness, an unusual pale skin color, or blue fingers or sores at the tips of your fingers in the hand on the side of your fistula.  · You have chills.  · You have a fever.  · You have pus or other fluid (drainage) at the vascular access site.  · You develop skin redness or red streaking on the skin around, above, or below the vascular access.  · You have bleeding at the vascular access that cannot be easily controlled.  · Your catheter gets pulled out of place.  · You feel your heart racing or skipping beats.  · You have chest pain.  Summary  · A vascular access is a connection to the blood inside your blood vessels that allows blood to be easily removed from your body and returned to your body during kidney dialysis (hemodialysis).  · There are three types of vascular accesses.The type of access that is best for you depends on the size and strength of your veins, your age, and any other health problems that you have, such as diabetes.  · A fistula is usually the preferred type of access, although it is not an option  for everyone. It can last several years and is less likely than the other types of accesses to become infected or to cause a blood clot within a blood vessel (thrombosis).  · Wear a medical alert bracelet. In case of an emergency, this tells health care providers that you are a dialysis patient.  This information is not intended to replace advice given to you by your health care provider. Make sure you discuss any questions you have with your health care provider.  Document Released: 03/09/2004 Document Revised: 04/07/2020 Document Reviewed: 01/12/2018  Elsevier Patient Education © 2020 [employer]

## 2020-12-09 ENCOUNTER — TELEPHONE (OUTPATIENT)
Dept: MEDICAL GROUP | Facility: MEDICAL CENTER | Age: 40
End: 2020-12-09

## 2020-12-09 NOTE — TELEPHONE ENCOUNTER
"Phone Number Called: [phone]    Call outcome: Spoke to patient regarding message below.    Message: Called to follow up with patient. Patient gets \"triggered\" in the hospital as they take her off of her medications that she has been on for a long time.     Missed dialysis on Monday 11/30 because she had an upset stomach. On Wednesday 12/02 the patient went for dialysis and found out that her fistula was clotted. Patient went to ED for the fact that her brachial shunt was clotted off. She had plans for neurology group to place a temporary dialysis cath on the following Tuesday.      Patient ended up back in ED on 12/04 with hyperkalemia and needing placed emergency dialysis catheter.     Patient was concerned because they discontinued her trazadone and tizanidine and confiscated her home medications from her bag that through her hospital stay the doctors thought she \"did something wrong\" or caused this to happen on purpose. Reassured patient that it was not her fault.     Patient experienced some possible withdrawal symptoms of tizanidine \"tremor, anxiety, shakiness\". Patient is wondering if it is ok for her to resume her trazadone and tizanidine for sleep as long as she continues to go to dialysis on time.    "

## 2020-12-10 ENCOUNTER — PATIENT MESSAGE (OUTPATIENT)
Dept: MEDICAL GROUP | Facility: MEDICAL CENTER | Age: 40
End: 2020-12-10

## 2020-12-10 ENCOUNTER — PATIENT OUTREACH (OUTPATIENT)
Dept: HEALTH INFORMATION MANAGEMENT | Facility: OTHER | Age: 40
End: 2020-12-10

## 2020-12-10 RX ORDER — TRAZODONE HYDROCHLORIDE 50 MG/1
50 TABLET ORAL
Qty: 90 TAB | Refills: 1 | Status: SHIPPED | OUTPATIENT
Start: 2020-12-10 | End: 2021-07-28

## 2020-12-10 RX ORDER — TIZANIDINE 4 MG/1
4 TABLET ORAL
Qty: 90 TAB | Refills: 1 | Status: SHIPPED | OUTPATIENT
Start: 2020-12-10 | End: 2021-01-06 | Stop reason: SDUPTHER

## 2020-12-11 SDOH — ECONOMIC STABILITY: FOOD INSECURITY: WITHIN THE PAST 12 MONTHS, YOU WORRIED THAT YOUR FOOD WOULD RUN OUT BEFORE YOU GOT MONEY TO BUY MORE.: OFTEN TRUE

## 2020-12-11 SDOH — ECONOMIC STABILITY: TRANSPORTATION INSECURITY
IN THE PAST 12 MONTHS, HAS THE LACK OF TRANSPORTATION KEPT YOU FROM MEDICAL APPOINTMENTS OR FROM GETTING MEDICATIONS?: YES

## 2020-12-11 SDOH — ECONOMIC STABILITY: FOOD INSECURITY: WITHIN THE PAST 12 MONTHS, THE FOOD YOU BOUGHT JUST DIDN'T LAST AND YOU DIDN'T HAVE MONEY TO GET MORE.: OFTEN TRUE

## 2020-12-11 SDOH — ECONOMIC STABILITY: TRANSPORTATION INSECURITY
IN THE PAST 12 MONTHS, HAS LACK OF TRANSPORTATION KEPT YOU FROM MEETINGS, WORK, OR FROM GETTING THINGS NEEDED FOR DAILY LIVING?: YES

## 2020-12-11 SDOH — ECONOMIC STABILITY: INCOME INSECURITY: HOW HARD IS IT FOR YOU TO PAY FOR THE VERY BASICS LIKE FOOD, HOUSING, MEDICAL CARE, AND HEATING?: SOMEWHAT HARD

## 2020-12-11 NOTE — PROGRESS NOTES
Community Health Worker Intake  • Social determinates of health intake Complete.   • Identified barriers to Food, Housing, Transportation.  • Contact information provided to Debby Carrizales   • Has PCP appointment scheduled 12/11  • Scheduled Food Delivery  • Outpatient assessment completed.  • Did the patient receive medications post discharge: Yes    CHW Holly called pt to introduce CCM program and to complete outpatient assessment. Pt identifies need for food, housing and transportation. CHW introduced Food-Rx program, pt accepted. CHW gave pt the contact information for [employer]. Pt is requesting disabled and low income housing resources to be mailed. Pt also requested information on how to obtain a Disabled Placard for her car.     Plan: CHW will mail pt all requested resources. CHW will file Food-Rx paper work. CHW will follow up with pt next week to confirm pt received all applications and to answer any questions.

## 2020-12-15 ENCOUNTER — PATIENT OUTREACH (OUTPATIENT)
Dept: HEALTH INFORMATION MANAGEMENT | Facility: OTHER | Age: 40
End: 2020-12-15

## 2020-12-15 DIAGNOSIS — G89.29 CHRONIC BILATERAL LOW BACK PAIN WITHOUT SCIATICA: ICD-10-CM

## 2020-12-15 DIAGNOSIS — G62.9 PERIPHERAL POLYNEUROPATHY: ICD-10-CM

## 2020-12-15 DIAGNOSIS — M87.052 AVASCULAR NECROSIS OF BONE OF HIP, LEFT (HCC): ICD-10-CM

## 2020-12-15 DIAGNOSIS — M54.50 CHRONIC BILATERAL LOW BACK PAIN WITHOUT SCIATICA: ICD-10-CM

## 2020-12-15 RX ORDER — PROMETHAZINE HYDROCHLORIDE 25 MG/1
TABLET ORAL
Qty: 60 TAB | Refills: 1 | Status: SHIPPED | OUTPATIENT
Start: 2020-12-15 | End: 2021-02-01 | Stop reason: SDUPTHER

## 2020-12-15 RX ORDER — PREGABALIN 50 MG/1
50 CAPSULE ORAL 2 TIMES DAILY
Qty: 56 CAP | Refills: 0 | Status: SHIPPED | OUTPATIENT
Start: 2020-12-15 | End: 2021-01-08 | Stop reason: SDUPTHER

## 2020-12-15 RX ORDER — OXYCODONE HYDROCHLORIDE 20 MG/1
1 TABLET ORAL 4 TIMES DAILY PRN
Qty: 108 TAB | Refills: 0 | Status: SHIPPED | OUTPATIENT
Start: 2020-12-15 | End: 2021-01-08 | Stop reason: SDUPTHER

## 2020-12-15 NOTE — PROGRESS NOTES
CHW received call from pt regarding pt's prescriptions. Pt states she is struggling to contact Dr. Barrientos office to order a script for her prescriptions. CHW introduced the medications tab on My Chart. CHW called Dr. Barrientos office on pts behalf. Per Dr. Barrientos office they are aware of the issue and will contact the pt once its resolved. CHW called pt to pass along information. Pt identifies no other needs at this time.

## 2020-12-21 ENCOUNTER — PATIENT OUTREACH (OUTPATIENT)
Dept: HEALTH INFORMATION MANAGEMENT | Facility: OTHER | Age: 40
End: 2020-12-21

## 2020-12-22 NOTE — DOCUMENTATION QUERY
[facility]                                                                       Query Response Note      PAT[facility]               [id number]  [facility]                     [id number]  :                      1980  ADMIT DATE:       2020 4:14 PM  DISCH DATE:        2020 1:21 PM  RESPONDING  PROVIDER #:        [id number]           QUERY TEXT:    Patient was diagnosed with a thrombosed AV fistula. Then underwent closure of the fistula on . However it is not clear where the AV fistula is located. Please clarify vein/artery.  NOTE: if an appropriate response is not listed below, please respond with a new note    The patient's clinical indicators include:  OP report -Pre-Operative Diagnosis:  ESRD   Thrombosed RUE AVG  Procedure:  Open thrombectomy of right upper extremity AV graft  Removal of portacath, left chest wall     H&P-#Failure of the hemodialysis arteriovenous fistula    Of note, she came to the ER on 2020 stating that she was unable to be dialyzed because her right brachial shunt was clotted off, intervention was planned on next Tuesday for catheter placement.  Was instructed to return to the ER if symptoms worsen.  Options provided:   -- Axillary artery,Right   -- Brachial artery,Right   -- Radial artery, Right   -- Subclavian Artery, Right   -- Ulnar artery, Right   -- Axillary Vein, right   -- Basilic vein, Right   -- Brachial Vein, Right   -- Cephalic Vein, Right      Query created by: Maris Rios on 2020 7:40 AM    RESPONSE TEXT:    Axillary Vein, right          Electronically signed by:  KIRILL GROVER MD 2020 1:01 PM

## 2020-12-23 NOTE — PROGRESS NOTES
CHW attempted to contact pt to confirm needs are being met. CHW unable to reach pt. CHW will d/c pt from CCM team.

## 2021-01-06 RX ORDER — TIZANIDINE 4 MG/1
8 TABLET ORAL
Qty: 180 TAB | Refills: 1 | Status: SHIPPED | OUTPATIENT
Start: 2021-01-06 | End: 2021-07-26 | Stop reason: SDUPTHER

## 2021-01-08 ENCOUNTER — OFFICE VISIT (OUTPATIENT)
Dept: PHYSICAL MEDICINE AND REHAB | Facility: MEDICAL CENTER | Age: 41
End: 2021-01-08
Payer: MEDICARE

## 2021-01-08 VITALS
HEIGHT: 65 IN | SYSTOLIC BLOOD PRESSURE: 132 MMHG | WEIGHT: 183.86 LBS | OXYGEN SATURATION: 93 % | DIASTOLIC BLOOD PRESSURE: 80 MMHG | HEART RATE: 93 BPM | BODY MASS INDEX: 30.63 KG/M2 | TEMPERATURE: 98.9 F

## 2021-01-08 DIAGNOSIS — M54.50 CHRONIC BILATERAL LOW BACK PAIN WITHOUT SCIATICA: ICD-10-CM

## 2021-01-08 DIAGNOSIS — F11.90 CHRONIC, CONTINUOUS USE OF OPIOIDS: ICD-10-CM

## 2021-01-08 DIAGNOSIS — G62.9 PERIPHERAL POLYNEUROPATHY: ICD-10-CM

## 2021-01-08 DIAGNOSIS — G89.29 CHRONIC BILATERAL LOW BACK PAIN WITHOUT SCIATICA: ICD-10-CM

## 2021-01-08 DIAGNOSIS — M79.7 FIBROMYALGIA: ICD-10-CM

## 2021-01-08 DIAGNOSIS — M87.052 AVASCULAR NECROSIS OF BONE OF HIP, LEFT (HCC): ICD-10-CM

## 2021-01-08 DIAGNOSIS — N18.6 ESRD (END STAGE RENAL DISEASE) ON DIALYSIS (HCC): ICD-10-CM

## 2021-01-08 DIAGNOSIS — Z99.2 ESRD (END STAGE RENAL DISEASE) ON DIALYSIS (HCC): ICD-10-CM

## 2021-01-08 PROCEDURE — 99214 OFFICE O/P EST MOD 30 MIN: CPT | Performed by: PHYSICAL MEDICINE & REHABILITATION

## 2021-01-08 RX ORDER — OXYCODONE HYDROCHLORIDE 20 MG/1
1 TABLET ORAL 4 TIMES DAILY PRN
Qty: 102 TAB | Refills: 0 | Status: SHIPPED | OUTPATIENT
Start: 2021-02-09 | End: 2021-03-05 | Stop reason: SDUPTHER

## 2021-01-08 RX ORDER — PREGABALIN 50 MG/1
50 CAPSULE ORAL 2 TIMES DAILY
Qty: 56 CAP | Refills: 1 | Status: SHIPPED | OUTPATIENT
Start: 2021-01-12 | End: 2021-02-09

## 2021-01-08 RX ORDER — OXYCODONE HYDROCHLORIDE 20 MG/1
1 TABLET ORAL 4 TIMES DAILY PRN
Qty: 102 TAB | Refills: 0 | Status: SHIPPED | OUTPATIENT
Start: 2021-01-12 | End: 2021-02-09

## 2021-01-08 ASSESSMENT — FIBROSIS 4 INDEX: FIB4 SCORE: 1.11

## 2021-01-08 ASSESSMENT — PATIENT HEALTH QUESTIONNAIRE - PHQ9: CLINICAL INTERPRETATION OF PHQ2 SCORE: 0

## 2021-01-08 ASSESSMENT — PAIN SCALES - GENERAL: PAINLEVEL: 6=MODERATE PAIN

## 2021-01-08 NOTE — PROGRESS NOTES
Follow up patient note  Pain Medicine, Interventional spine and sports physiatry, Physical medicine rehabilitation      Chief complaint:   Diffuse joint and body pain, low back, hips and legs      HISTORY      HPI  Patient identification/Interval history:   Debby Carrizales 40 y.o. female who has chronic pain related to rheumatologic disease, peripheral neuropathy and AVN hips, lupus.      Since her last visit, Debby has had continued joint pains.  She has had some difficulties with her dialysis graft clotting and they had to replace her catheter and removed her power port.  She has seen a surgeon about replacing the graft.    Pain is a 6/10 on the NRS.  Joint pain is diffuse.      Lyrica is well-tolerated.  The decrease has not been difficult.  Dialysis is painful, but she has been able to tolerate it with the use of her pain medications.  She is usually taking 1/2 pill of percocet less on non-dialysis days.    Her bowel movements have been pretty regular with use of colace.      ORT: 3  PHQ-9:0    Review of records:   Hospital discharge report noted from 08/12/2019-08/21/2019 admission.  She was admitted for a near syncopal episode.  Cardiac evaluation was suspicious for endocarditis and she was started on empiric IV abx.  GAYATHRI demonstrate no endocarditis, but did show that her venous catheter was pushing through the tricuspid valve.  Cultures were negative.  Dr. Jansen, vascular surgery, was consulted and they discussed follow-up plans for port management and this will be planned after discharge.    ROS   Unchanged from 09/22/2020 except as noted in HPI     Red Flags :   Chills, Sweats:No fevers, chills and night sweats.  No fevers lately  Involuntary Weight Loss: Denies  Bowel/Bladder Incontinence: Denies  Saddle Anesthesia: Denies    PMHx:   Past Medical History:   Diagnosis Date   • Arthritis     all joints,r/t lupus   • Avascular necrosis of bones of both hips (HCC) 10/10/2016   • Clostridium  difficile colitis 5/3/2011   • Dialysis patient    • ESBL (extended spectrum beta-lactamase) producing bacteria infection 8/25/2014   • Fibromyalgia    • High anion gap metabolic acidosis 4/2/2011   • Hypertension    • Lupus (HCC)    • Pneumonia    • Psychiatric disorder     anxiety, depression   • Pyelonephritis 11/21/2017   • Renal failure    • Sepsis due to pneumonia ([facility]) 6/7/2018   • Status epilepticus ([facility]) 12/13/2018       PSHx:   Past Surgical History:   Procedure Laterality Date   • THROMBECTOMY Right 12/7/2020    Procedure: Right upper extremity arteriovenous graft thrombectomy;  Surgeon: Daniel Poole M.D.;  Location: [facility];  Service: Vascular   • CATH PLACEMENT Left 12/7/2020    Procedure: port-a-catheter removal;  Surgeon: Daniel Poole M.D.;  Location: [facility];  Service: Vascular   • GAYATHRI N/A 8/20/2019    Procedure: ECHOCARDIOGRAM, TRANSESOPHAGEAL;  Surgeon: Marta Elaine M.D.;  Location: [facility];  Service: Cardiac   • AV FISTULA REVISION Right 8/11/2018    Procedure: AV FISTULA REVISION- Graft and Thrombectomy;  Surgeon: Shabbir Ardon M.D.;  Location: [facility];  Service: General   • AV FISTULA THROMBOLYSIS Right 8/11/2018    Procedure: AV FISTULA THROMBOLYSIS;  Surgeon: Shabbir Ardon M.D.;  Location: [facility];  Service: General   • AV FISTULA CREATION Right 12/9/2017    Procedure: AV FISTULA CREATION-ARM FOR GRAFT;  Surgeon: Lesly Jansen M.D.;  Location: [facility];  Service: General   • THROMBECTOMY  12/9/2017    Procedure: THROMBECTOMY;  Surgeon: Lesly Jansen M.D.;  Location: [facility];  Service: General   • THROMBECTOMY Right 8/21/2016    Procedure: THROMBECTOMY - right AV fistula graft with grams;  Surgeon: Shabbir Ardon M.D.;  Location: [facility];  Service:    • ANGIOPLASTY BALLOON  8/21/2016    Procedure: ANGIOPLASTY BALLOON;  Surgeon: Shabbir PRITCHARD  RAMON Ardon;  Location: SUR[facility]ERY [facility];  Service:    • THROMBECTOMY Right 8/20/2016    Procedure: THROMBECTOMY AV GRAFT;  Surgeon: Shabbir Ardon M.D.;  Location: SURGERY [facility];  Service:    • AV FISTULA CREATION Right 7/12/2016    Procedure: AV FISTULA CREATION WITH GRAFT BRACHIAL AXILLARY;  Surgeon: Shabbir Ardon M.D.;  Location: SURGERY [facility];  Service:    • CLOSED REDUCTION Right 7/5/2016    Procedure: CLOSED REDUCTION- Hip ;  Surgeon: Michael Holman M.D.;  Location: SURGERY [facility];  Service:    • HIP ARTHROPLASTY TOTAL Right 1/18/2016    Procedure: HIP ARTHROPLASTY TOTAL;  Surgeon: Michael Holman M.D.;  Location: [facility];  Service:    • AV FISTULA CREATION  2/3/2015    Performed by Shabbir Ardon M.D. at [facility]   • AV FISTULA CREATION  11/14/2014    Performed by Shabbir Ardon M.D. at [facility]   • AV FISTULA CREATION  9/9/2014    Performed by Shabbir Ardon M.D. at [facility]   • CATH PLACEMENT  9/9/2014    Performed by Shabbir Ardon M.D. at [facility]   • OTHER  5/2011    tracheostomy   • GASTROSCOPY-ENDO  4/27/2011    Performed by PALMIRA DOAN at ENDOSCOPY [facility]   • COLONOSCOPY WITH BIOPSY  4/20/2011    Performed by ALCIRA CRUZ at [facility]   • GASTROSCOPY-ENDO  4/18/2011    Performed by ALCIRA CRUZ at [facility]   • GASTROSCOPY-ENDO  4/10/2011    Performed by SYED JURADO at ENDOSCOPY [facility]   • SCLEROTHERAPHY  4/10/2011    Performed by SYED JURADO at [facility]   • OTHER ABDOMINAL SURGERY      kidney biopsy   • TRACHEOSTOMY         Family history   Family History   Problem Relation Age of Onset   • Heart Disease Mother    • Cancer Father        Medications:   Outpatient Medications Marked as Taking for the 1/8/21 encounter (Office Visit) with Quinn Chandler M.D.   Medication  Sig Dispense Refill   • [START ON 1/12/2021] Oxycodone HCl 20 MG Tab Take 1 Tab by mouth 4 times a day as needed (pain) for up to 28 days. 102 Tab 0   • [START ON 2/9/2021] Oxycodone HCl 20 MG Tab Take 1 Tab by mouth 4 times a day as needed (pain) for up to 28 days. 102 Tab 0   • [START ON 1/12/2021] pregabalin (LYRICA) 50 MG capsule Take 1 Cap by mouth 2 times a day for 28 days. 56 Cap 1   • tizanidine (ZANAFLEX) 4 MG Tab Take 2 Tabs by mouth at bedtime as needed (muscle spasms). 180 Tab 1   • promethazine (PHENERGAN) 25 MG Tab TAKE 1 TABLET BY MOUTH EVERY 8 HOURS AS NEEDED FOR NAUSEA OR VOMITING 60 Tab 1   • traZODone (DESYREL) 50 MG Tab Take 1 Tab by mouth every bedtime. 90 Tab 1   • lisinopril (PRINIVIL) 10 MG Tab Take 1 Tab by mouth every day. On day of dialysis, take this medication after dialysis. This medication is for high blood pressure 30 Tab 2   • Naloxone (NARCAN) 4 MG/0.1ML Liquid Administer 4 mg into affected nostril(S) one time as needed (for severe sleepiness or difficulty breathing due to opioid overdose) for up to 1 dose. 2 Each 0   • VELPHORO 500 MG Chew Tab TAKE 2 TABLETS BY MOUTH THREE TIMES DAILY WITH EACH MEALS 180 Tab 3   • amLODIPine (NORVASC) 10 MG Tab Take 1 Tab by mouth every day. 90 Tab 3   • fluticasone (FLONASE) 50 MCG/ACT nasal spray SHAKE LIQUID AND USE 1 SPRAY IN EACH NOSTRIL EVERY DAY 48 g 3   • lacosamide (VIMPAT) 100 MG Tab tablet Take 100 mg by mouth 2 Times a Day.     • Cholecalciferol (VITAMIN D3) 92747 UNIT Cap Take 10,000 Units by mouth every day.     • docusate sodium (COLACE) 100 MG Cap Take 100 mg by mouth every day.     • omeprazole (PRILOSEC) 20 MG delayed-release capsule Take 1 Cap by mouth every day. 30 Cap 3   • ferrous sulfate 325 (65 FE) MG tablet Take 325 mg by mouth every day.     • hydroxychloroquine (PLAQUENIL) 200 MG Tab Take 200 mg by mouth every morning.         Allergies:   Allergies   Allergen Reactions   • Lorazepam [Ativan] Anxiety     Patient becomes  severely paranoid and agitated   • Morphine Itching     Tolerates Dilaudid   • Seasonal Runny Nose and Itching     Hay fever, sabiha brush       Social Hx:   Social History     Socioeconomic History   • Marital status: Legally      Spouse name: Not on file   • Number of children: Not on file   • Years of education: Not on file   • Highest education level: Not on file   Occupational History   • Not on file   Social Needs   • Financial resource strain: Somewhat hard   • Food insecurity     Worry: Often true     Inability: Often true   • Transportation needs     Medical: Yes     Non-medical: Yes   Tobacco Use   • Smoking status: Former Smoker     Packs/day: 0.50     Years: 18.00     Pack years: 9.00     Types: Cigarettes     Start date:      Quit date: 2011     Years since quittin.5   • Smokeless tobacco: Never Used   • Tobacco comment: started at 15   Substance and Sexual Activity   • Alcohol use: No     Alcohol/week: 0.0 oz     Frequency: Never     Binge frequency: Never   • Drug use: No     Types: Marijuana   • Sexual activity: Not Currently     Partners: Male   Lifestyle   • Physical activity     Days per week: Not on file     Minutes per session: Not on file   • Stress: Not on file   Relationships   • Social connections     Talks on phone: Not on file     Gets together: Not on file     Attends [employer] service: Not on file     Active member of club or organization: Not on file     Attends meetings of clubs or organizations: Not on file     Relationship status: Not on file   • Intimate partner violence     Fear of current or ex partner: Not on file     Emotionally abused: Not on file     Physically abused: Not on file     Forced sexual activity: Not on file   Other Topics Concern   •  Service No   • Blood Transfusions Yes   • Caffeine Concern No   • Occupational Exposure No   • Hobby Hazards No   • Sleep Concern Yes   • Stress Concern Yes   • Weight Concern Yes   • Special Diet Yes   •  "Back Care No   • Exercise Yes   • Bike Helmet No   • Seat Belt Yes   • Self-Exams Yes   Social History Narrative   • Not on file       EXAMINATION     Physical Exam:   Vitals: /80 (BP Location: Right arm, Patient Position: Sitting, BP Cuff Size: Small adult)   Pulse 93   Temp 37.2 °C (98.9 °F) (Temporal)   Ht 1.651 m (5' 5\")   Wt 83.4 kg (183 lb 13.8 oz)   SpO2 93%     Constitutional:   Body Habitus: Body mass index is 30.6 kg/m².  Cooperation: Fully cooperates with exam  Appearance: Well-groomed no disheveled, in no acute distress, wearing a face mask  Respiratory-  breathing comfortable on room air, no wheezing.  Cardiovascular- no edema in the lower extremities  Psychiatric- alert and oriented ×3. Normal affect.   Spine:   Tenderness to palpation over the thoracic and lumbar paraspinals bilaterally, trapezius mild.  Functional ROM of the shoulders.  Neck supple  Gait slow, steady without loss of balance.  No use of assist device.   No focal motor deficits in the lower extremities bilaterally      MEDICAL DECISION MAKING    DATA    Labs:     UDS:  03/12/2020 Positive for oxycodone and metabolites  12/19/2019 oxycodone and metabolites   08/22/2019 Oxycodone and metabolites (patient does not have a script for phenergan with codeine and this was negative) Therefore, consistent with medications  06/13/2019 Negative for oxycodone, positive for other oxycodone metabolites  04/16/2019 Positive for oxymorphone  01/17/2019 Oxycodone and metabolites as expected  10/02/2018 Oxycodone and metabolites, also fentanyl    01/09/2019: Hep B surface ag negative  01/08/2019: GFR 7; BUN 23 Cr 6.68, Plt 160    12/15/2018 CRP 3.03    GFR 08/21/2019, 4  08/21/2019 WBC 4.6, Hb 10.1, Hct 31.6, Plt 156    BMP 12/16/2018  Na 136, Potassium 4.4, chloride 96, CO2 23 Glucose 83, BUN 55H, Cr 9.44H, Calcium 9.9, Anion gap 17.0H    CBC 3.3, Hb 9.6, Hct 31.3, Plt 115    Lab Results   Component Value Date/Time    HBA1C 4.9 06/07/2018 " 02:29 AM        Imaging:   I reviewed the following radiology reports reviewed  GAYATHRI 08/20/2019  Echocardiography Laboratory  CONCLUSIONS  LV EF    65%.  Structurally normal tricuspid valve.  Trace tricuspid regurgitation.  Port catheter noted in the SVC and right atrium.  Tip support appears bright, no obvious vegetation.  The tip of the port abuts the tricuspid valve.  Recommend the port be pulled back approximately 2 cm.  No evidence of endocarditis.    Xray hip with pelvis-left 07/28/2019  Left femoral head heterogeneous density is compatible with known diagnosis of avascular necrosis. There is no joint space narrowing or spurring    Right arthroplasty without evident complication.    Xray left foot 01/21/2019  Nondisplaced transverse fracture through the base of the fifth metatarsal.    MRI lumbar spine 1/1/19  1.  Diffusely decreased signal again seen throughout the marrow space consistent with red marrow conversion, likely secondary to chronic anemia.  2.  No significant disc bulging, central canal narrowing, or foraminal narrowing.  3.  No lumbar spine fracture or subluxation.    MRI brain 12/13/2018  1.  There is no hippocampal sclerosis.  2.  There is no evidence of cortical dysplasia or neuronal migrational abnormalities.  3.  A few nonspecific T2 hyperintensities in the supratentorial white matter likely representing nonspecific foci of gliosis, chronic ischemia and demyelination. This is unchanged since the previous MRI dated 2/23/2012.       Chest xray 06/07/2018: Minimal right-sided opacities as described above, suggesting pneumonia.               Results for orders placed during the hospital encounter of 07/19/17   MR-LUMBAR SPINE-W/O    Impression 1.  Diffuse decreased bone marrow signal intensity throughout the lumbar spine and sacrum, presumably secondary to chronic anemia.    2.  Otherwise unremarkable MRI scan of the lumbar spine without contrast.                                    DIAGNOSIS    Visit Diagnoses     ICD-10-CM   1. Peripheral polyneuropathy  G62.9   2. Avascular necrosis of bone of hip, left (HCC)  M87.052   3. Chronic bilateral low back pain without sciatica  M54.5    G89.29   4. Fibromyalgia  M79.7   5. ESRD (end stage renal disease) on dialysis (HCC)  N18.6    Z99.2   6. Chronic, continuous use of opioids  F11.90         ASSESSMENT and PLAN:     Debby Carrizales 40 y.o. Female returns for follow-up of her chronic pain related to lupus, AVN hips, low back and peripheral neuropathy    Debby was seen today for follow-up.    Diagnoses and all orders for this visit:    Peripheral polyneuropathy  -     Oxycodone HCl 20 MG Tab; Take 1 Tab by mouth 4 times a day as needed (pain) for up to 28 days.  -     Oxycodone HCl 20 MG Tab; Take 1 Tab by mouth 4 times a day as needed (pain) for up to 28 days.  -     pregabalin (LYRICA) 50 MG capsule; Take 1 Cap by mouth 2 times a day for 28 days.  -     Consent for Opiate Prescription  -     Controlled Substance Treatment Agreement  -     Cancel: Pain Management Screen  -     Pain Management Screen    Avascular necrosis of bone of hip, left (HCC)  -     Oxycodone HCl 20 MG Tab; Take 1 Tab by mouth 4 times a day as needed (pain) for up to 28 days.  -     Oxycodone HCl 20 MG Tab; Take 1 Tab by mouth 4 times a day as needed (pain) for up to 28 days.  -     Consent for Opiate Prescription  -     Controlled Substance Treatment Agreement  -     Cancel: Pain Management Screen  -     Pain Management Screen    Chronic bilateral low back pain without sciatica  -     Oxycodone HCl 20 MG Tab; Take 1 Tab by mouth 4 times a day as needed (pain) for up to 28 days.  -     Oxycodone HCl 20 MG Tab; Take 1 Tab by mouth 4 times a day as needed (pain) for up to 28 days.  -     Consent for Opiate Prescription  -     Controlled Substance Treatment Agreement  -     Cancel: Pain Management Screen  -     Pain Management Screen    Fibromyalgia  -     pregabalin (LYRICA) 50 MG  "capsule; Take 1 Cap by mouth 2 times a day for 28 days.    ESRD (end stage renal disease) on dialysis (HCC)    Chronic, continuous use of opioids       1. Reviewed  and plan to recheck UDS.  Saliva test ordered.  2. Continue lyrica 50mg po bid.  Refilled today.  3. Continue oxycodone.  Plan for #102 for the next 28 days.  Script given for this month and next.  4. Follow-up with PCP and specialists, she will follow-up regarding catheter and possible transition to peritoneal dialysis.  5. Hip pain is stable.  6. Continue activity as tolerated.     In prescribing controlled substances to this patient, I certify that I have obtained and reviewed the medical history of Debby Carrizales. I have also made a good ly effort to obtain applicable records from other providers who have treated the patient and records demonstrating the following: report of \"drug-seeking behavior,\" although I suspect that she has organic reasons for pain and will continue to monitor as appropriate.     I have conducted a physical exam and documented it. I have reviewed Ms. Carrizales’s prescription history as maintained by the [place] Prescription Monitoring Program.     I have assessed the patient’s risk for abuse, dependency, and addiction using the validated Opioid Risk Tool available at https://www.mdcalc.com/hodfra-taxr-jvnr-ort-narcotic-abuse.     Given the above, I believe the benefits of controlled substance therapy outweigh the risks. The reasons for prescribing controlled substances include non-narcotic, oral analgesic alternatives have been inadequate for pain control and in my professional opinion, controlled substances are the only reasonable choice for this patient because she has limitations to medication choices due to being on dialysis.   Accordingly, I have discussed the risk and benefits, treatment plan, and alternative therapies with the patient.     Follow up: 2 months    Please note that this dictation was created " using voice recognition software. I have made every reasonable attempt to correct obvious errors but there may be errors of grammar and content that I may have overlooked prior to finalization of this note.      Quinn Chandler MD  Interventional Spine and Sports Physiatry  Physical Medicine and Rehabilitation  [facility]

## 2021-02-02 RX ORDER — PROMETHAZINE HYDROCHLORIDE 25 MG/1
25 TABLET ORAL EVERY 8 HOURS PRN
Qty: 60 TAB | Refills: 11 | Status: ON HOLD | OUTPATIENT
Start: 2021-02-02 | End: 2021-11-05

## 2021-03-05 ENCOUNTER — OFFICE VISIT (OUTPATIENT)
Dept: PHYSICAL MEDICINE AND REHAB | Facility: MEDICAL CENTER | Age: 41
End: 2021-03-05
Payer: MEDICARE

## 2021-03-05 VITALS
HEIGHT: 65 IN | BODY MASS INDEX: 19.87 KG/M2 | DIASTOLIC BLOOD PRESSURE: 82 MMHG | TEMPERATURE: 98.3 F | SYSTOLIC BLOOD PRESSURE: 128 MMHG | OXYGEN SATURATION: 91 % | WEIGHT: 119.27 LBS | HEART RATE: 109 BPM

## 2021-03-05 DIAGNOSIS — G62.9 PERIPHERAL POLYNEUROPATHY: ICD-10-CM

## 2021-03-05 DIAGNOSIS — F11.90 CHRONIC, CONTINUOUS USE OF OPIOIDS: ICD-10-CM

## 2021-03-05 DIAGNOSIS — M54.50 THORACOLUMBAR BACK PAIN: ICD-10-CM

## 2021-03-05 DIAGNOSIS — M79.7 FIBROMYALGIA: ICD-10-CM

## 2021-03-05 DIAGNOSIS — N18.6 ESRD (END STAGE RENAL DISEASE) ON DIALYSIS (HCC): ICD-10-CM

## 2021-03-05 DIAGNOSIS — M79.10 MYALGIA: ICD-10-CM

## 2021-03-05 DIAGNOSIS — M54.6 THORACOLUMBAR BACK PAIN: ICD-10-CM

## 2021-03-05 DIAGNOSIS — M54.50 CHRONIC BILATERAL LOW BACK PAIN WITHOUT SCIATICA: ICD-10-CM

## 2021-03-05 DIAGNOSIS — M62.838 MUSCLE SPASM: ICD-10-CM

## 2021-03-05 DIAGNOSIS — Z99.2 ESRD (END STAGE RENAL DISEASE) ON DIALYSIS (HCC): ICD-10-CM

## 2021-03-05 DIAGNOSIS — G89.29 CHRONIC BILATERAL LOW BACK PAIN WITHOUT SCIATICA: ICD-10-CM

## 2021-03-05 DIAGNOSIS — M87.052 AVASCULAR NECROSIS OF BONE OF HIP, LEFT (HCC): ICD-10-CM

## 2021-03-05 DIAGNOSIS — M25.561 RIGHT KNEE PAIN, UNSPECIFIED CHRONICITY: ICD-10-CM

## 2021-03-05 PROCEDURE — 99214 OFFICE O/P EST MOD 30 MIN: CPT | Performed by: PHYSICAL MEDICINE & REHABILITATION

## 2021-03-05 RX ORDER — PREGABALIN 50 MG/1
50 CAPSULE ORAL 2 TIMES DAILY
Qty: 60 CAPSULE | Refills: 0 | Status: SHIPPED | OUTPATIENT
Start: 2021-03-05 | End: 2021-04-02 | Stop reason: SDUPTHER

## 2021-03-05 RX ORDER — OXYCODONE HYDROCHLORIDE 20 MG/1
1 TABLET ORAL 4 TIMES DAILY PRN
Qty: 102 TABLET | Refills: 0 | Status: SHIPPED | OUTPATIENT
Start: 2021-03-09 | End: 2021-04-02 | Stop reason: SDUPTHER

## 2021-03-05 ASSESSMENT — PAIN SCALES - GENERAL: PAINLEVEL: 6=MODERATE PAIN

## 2021-03-05 ASSESSMENT — PATIENT HEALTH QUESTIONNAIRE - PHQ9: CLINICAL INTERPRETATION OF PHQ2 SCORE: 0

## 2021-03-05 ASSESSMENT — FIBROSIS 4 INDEX: FIB4 SCORE: 1.11

## 2021-03-05 NOTE — PROGRESS NOTES
Follow up patient note  Pain Medicine, Interventional spine and sports physiatry, Physical medicine rehabilitation      Chief complaint:   Diffuse joint and body pain, low back, hips and legs      HISTORY      HPI  Patient identification/Interval history:   Debby Carrizales 40 y.o. female who has chronic pain related to rheumatologic disease, peripheral neuropathy and AVN hips, lupus.      Since the last visit, Debby reports that she has been having more pain in her back.  She has had more burning and stinging in her back.  Some months, this has been fine, but she feels like this is worse this past month, wonders if this is worse due to lowered lyrica.  Most months, she feels like this is okay.    Aching pain in the upper back and low back and lower extremities.  Her hips are better when she stretches. Otherwise, stable.  Right knee has been clicking again.  No locking or giving out.  This has been long-standing.  When she was dancing, she would wear a brace at times and thinks that she has a patellar cutout brace at home.    Pain is a 6-7/10 on the NRS.      Dialysis is okay for now, she is working on transition plan to peritoneal dialysis, she reports.  Tolerating Dialysis is painful, but she has been able to tolerate it with the use of her pain medications.  She is usually taking 1/2 pill of percocet less on non-dialysis days.  Bowel movements have been regular    ORT: 3  PHQ-9:0    Review of records:   Hospital discharge report noted from 08/12/2019-08/21/2019 admission.  She was admitted for a near syncopal episode.  Cardiac evaluation was suspicious for endocarditis and she was started on empiric IV abx.  GAYATHRI demonstrate no endocarditis, but did show that her venous catheter was pushing through the tricuspid valve.  Cultures were negative.  Dr. Jansen, vascular surgery, was consulted and they discussed follow-up plans for port management and this will be planned after discharge.    ROS   Unchanged      Red Flags :   Chills, Sweats:No fevers, chills and night sweats.  No fevers lately  Involuntary Weight Loss: Denies  Bowel/Bladder Incontinence: Denies  Saddle Anesthesia: Denies    PMHx:   Past Medical History:   Diagnosis Date   • Arthritis     all joints,r/t lupus   • Avascular necrosis of bones of both hips ([facility]) 10/10/2016   • Clostridium difficile colitis 5/3/2011   • Dialysis patient    • ESBL (extended spectrum beta-lactamase) producing bacteria infection 8/25/2014   • Fibromyalgia    • High anion gap metabolic acidosis 4/2/2011   • Hypertension    • Lupus ([facility])    • Pneumonia    • Psychiatric disorder     anxiety, depression   • Pyelonephritis 11/21/2017   • Renal failure    • Sepsis due to pneumonia ([facility]) 6/7/2018   • Status epilepticus ([facility]) 12/13/2018       PSHx:   Past Surgical History:   Procedure Laterality Date   • THROMBECTOMY Right 12/7/2020    Procedure: Right upper extremity arteriovenous graft thrombectomy;  Surgeon: Daniel Poole M.D.;  Location: [facility];  Service: Vascular   • CATH PLACEMENT Left 12/7/2020    Procedure: port-a-catheter removal;  Surgeon: Daniel Poole M.D.;  Location: [facility];  Service: Vascular   • GAYATHRI N/A 8/20/2019    Procedure: ECHOCARDIOGRAM, TRANSESOPHAGEAL;  Surgeon: Marta Elaine M.D.;  Location: [facility];  Service: Cardiac   • AV FISTULA REVISION Right 8/11/2018    Procedure: AV FISTULA REVISION- Graft and Thrombectomy;  Surgeon: Shabbir Ardon M.D.;  Location: [facility];  Service: General   • AV FISTULA THROMBOLYSIS Right 8/11/2018    Procedure: AV FISTULA THROMBOLYSIS;  Surgeon: Shabbir Ardon M.D.;  Location: [facility];  Service: General   • AV FISTULA CREATION Right 12/9/2017    Procedure: AV FISTULA CREATION-ARM FOR GRAFT;  Surgeon: Lesly Jansen M.D.;  Location: [facility];  Service: General   • THROMBECTOMY  12/9/2017    Procedure: THROMBECTOMY;  Surgeon:  Lesly Jansen M.D.;  Location: [facility];  Service: General   • THROMBECTOMY Right 8/21/2016    Procedure: THROMBECTOMY - right AV fistula graft with grams;  Surgeon: Shabbir Ardon M.D.;  Location: [facility];  Service:    • ANGIOPLASTY BALLOON  8/21/2016    Procedure: ANGIOPLASTY BALLOON;  Surgeon: Shabbir Ardon M.D.;  Location: [facility];  Service:    • THROMBECTOMY Right 8/20/2016    Procedure: THROMBECTOMY AV GRAFT;  Surgeon: Shabbir Ardon M.D.;  Location: [facility];  Service:    • AV FISTULA CREATION Right 7/12/2016    Procedure: AV FISTULA CREATION WITH GRAFT BRACHIAL AXILLARY;  Surgeon: Shabbir Ardon M.D.;  Location: [facility];  Service:    • CLOSED REDUCTION Right 7/5/2016    Procedure: CLOSED REDUCTION- Hip ;  Surgeon: Michael Holman M.D.;  Location: [facility];  Service:    • HIP ARTHROPLASTY TOTAL Right 1/18/2016    Procedure: HIP ARTHROPLASTY TOTAL;  Surgeon: Michael Holman M.D.;  Location: [facility];  Service:    • AV FISTULA CREATION  2/3/2015    Performed by Shabbir Ardon M.D. at SUR[facility]ERY [facility]   • AV FISTULA CREATION  11/14/2014    Performed by Shabbir Ardon M.D. at [facility]   • AV FISTULA CREATION  9/9/2014    Performed by Shabbir Ardon M.D. at SUR[facility]ERY [facility]   • CATH PLACEMENT  9/9/2014    Performed by Shabbir Ardon M.D. at [facility]   • OTHER  5/2011    tracheostomy   • GASTROSCOPY-ENDO  4/27/2011    Performed by PALMIRA DOAN at ENDOSCOPY [facility]   • COLONOSCOPY WITH BIOPSY  4/20/2011    Performed by ALCIRA CRUZ at ENDOSCOPY [facility]   • GASTROSCOPY-ENDO  4/18/2011    Performed by ALCIRA CRUZ at ENDOSCOPY [facility]   • GASTROSCOPY-ENDO  4/10/2011    Performed by SYED JURADO at [facility]COPY [facility]   • SCLEROTHERAPHY  4/10/2011    Performed by SYED JURADO at  [facility]COPY [facility]   • OTHER ABDOMINAL SURGERY      kidney biopsy   • TRACHEOSTOMY         Family history   Family History   Problem Relation Age of Onset   • Heart Disease Mother    • Cancer Father        Medications:   Outpatient Medications Marked as Taking for the 3/5/21 encounter (Office Visit) with Quinn Chandler M.D.   Medication Sig Dispense Refill   • [START ON 3/9/2021] Oxycodone HCl 20 MG Tab Take 1 tablet by mouth 4 times a day as needed (pain) for up to 28 days. 102 tablet 0   • pregabalin (LYRICA) 50 MG capsule Take 1 capsule by mouth 2 times a day for 30 days. 60 capsule 0   • promethazine (PHENERGAN) 25 MG Tab Take 1 Tab by mouth every 8 hours as needed for Nausea/Vomiting. 60 Tab 11   • tizanidine (ZANAFLEX) 4 MG Tab Take 2 Tabs by mouth at bedtime as needed (muscle spasms). 180 Tab 1   • traZODone (DESYREL) 50 MG Tab Take 1 Tab by mouth every bedtime. 90 Tab 1   • lisinopril (PRINIVIL) 10 MG Tab Take 1 Tab by mouth every day. On day of dialysis, take this medication after dialysis. This medication is for high blood pressure 30 Tab 2   • Naloxone (NARCAN) 4 MG/0.1ML Liquid Administer 4 mg into affected nostril(S) one time as needed (for severe sleepiness or difficulty breathing due to opioid overdose) for up to 1 dose. 2 Each 0   • VELPHORO 500 MG Chew Tab TAKE 2 TABLETS BY MOUTH THREE TIMES DAILY WITH EACH MEALS 180 Tab 3   • amLODIPine (NORVASC) 10 MG Tab Take 1 Tab by mouth every day. 90 Tab 3   • fluticasone (FLONASE) 50 MCG/ACT nasal spray SHAKE LIQUID AND USE 1 SPRAY IN EACH NOSTRIL EVERY DAY 48 g 3   • Cholecalciferol (VITAMIN D3) 64288 UNIT Cap Take 10,000 Units by mouth every day.     • docusate sodium (COLACE) 100 MG Cap Take 100 mg by mouth every day.     • omeprazole (PRILOSEC) 20 MG delayed-release capsule Take 1 Cap by mouth every day. 30 Cap 3   • ferrous sulfate 325 (65 FE) MG tablet Take 325 mg by mouth every day.     • hydroxychloroquine (PLAQUENIL) 200 MG Tab Take 200 mg  by mouth every morning.         Allergies:   Allergies   Allergen Reactions   • Lorazepam [Ativan] Anxiety     Patient becomes severely paranoid and agitated   • Morphine Itching     Tolerates Dilaudid   • Seasonal Runny Nose and Itching     Hay fever, sabiha brush       Social Hx:   Social History     Socioeconomic History   • Marital status: Legally      Spouse name: Not on file   • Number of children: Not on file   • Years of education: Not on file   • Highest education level: Not on file   Occupational History   • Not on file   Tobacco Use   • Smoking status: Former Smoker     Packs/day: 0.50     Years: 18.00     Pack years: 9.00     Types: Cigarettes     Start date:      Quit date: 2011     Years since quittin.7   • Smokeless tobacco: Never Used   • Tobacco comment: started at 15   Substance and Sexual Activity   • Alcohol use: No     Alcohol/week: 0.0 oz   • Drug use: No     Types: Marijuana   • Sexual activity: Not Currently     Partners: Male   Other Topics Concern   •  Service No   • Blood Transfusions Yes   • Caffeine Concern No   • Occupational Exposure No   • Hobby Hazards No   • Sleep Concern Yes   • Stress Concern Yes   • Weight Concern Yes   • Special Diet Yes   • Back Care No   • Exercise Yes   • Bike Helmet No   • Seat Belt Yes   • Self-Exams Yes   Social History Narrative   • Not on file     Social Determinants of Health     Financial Resource Strain: Medium Risk   • Difficulty of Paying Living Expenses: Somewhat hard   Food Insecurity: Food Insecurity Present   • Worried About Running Out of Food in the Last Year: Often true   • Ran Out of Food in the Last Year: Often true   Transportation Needs: Unmet Transportation Needs   • Lack of Transportation (Medical): Yes   • Lack of Transportation (Non-Medical): Yes   Physical Activity:    • Days of Exercise per Week:    • Minutes of Exercise per Session:    Stress:    • Feeling of Stress :    Social Connections:    •  "Frequency of Communication with Friends and Family:    • Frequency of Social Gatherings with Friends and Family:    • Attends Anabaptism Services:    • Active Member of Clubs or Organizations:    • Attends Club or Organization Meetings:    • Marital Status:    Intimate Partner Violence:    • Fear of Current or Ex-Partner:    • Emotionally Abused:    • Physically Abused:    • Sexually Abused:        EXAMINATION     Physical Exam:   Vitals: /82 (BP Location: Left arm, Patient Position: Sitting, BP Cuff Size: Small adult)   Pulse (!) 109   Temp 36.8 °C (98.3 °F) (Temporal)   Ht 1.651 m (5' 5\")   Wt 54.1 kg (119 lb 4.3 oz)   SpO2 91%     Constitutional:   Body Habitus: Body mass index is 19.85 kg/m².  Cooperation: Fully cooperates with exam  Appearance: Well-groomed no disheveled, in no acute distress, wearing a face mask  Respiratory-  breathing comfortable on room air, no wheezing.  Cardiovascular- no edema in the lower extremities  Psychiatric- alert and oriented ×3. Normal affect.   Spine:   Tenderness to palpation over the thoracic and lumbar paraspinals bilaterally with muscle spasms right greater than left, trapezius mild.  Functional ROM of the shoulders.  Neck supple  Gait slow, steady without loss of balance.  No use of assist device.   No focal motor deficits in the lower extremities bilaterally      MEDICAL DECISION MAKING    DATA    Labs:     UDS:  01/08/2021: positive for oxycodone and metabolites  03/12/2020 Positive for oxycodone and metabolites  12/19/2019 oxycodone and metabolites   08/22/2019 Oxycodone and metabolites (patient does not have a script for phenergan with codeine and this was negative) Therefore, consistent with medications  06/13/2019 Negative for oxycodone, positive for other oxycodone metabolites  04/16/2019 Positive for oxymorphone  01/17/2019 Oxycodone and metabolites as expected  10/02/2018 Oxycodone and metabolites, also fentanyl    01/09/2019: Hep B surface ag " negative  01/08/2019: GFR 7; BUN 23 Cr 6.68, Plt 160    12/15/2018 CRP 3.03    GFR 12/06/2020 4  GFR 08/21/2019, 4  08/21/2019 WBC 4.6, Hb 10.1, Hct 31.6, Plt 156    BMP 12/16/2018  Na 136, Potassium 4.4, chloride 96, CO2 23 Glucose 83, BUN 55H, Cr 9.44H, Calcium 9.9, Anion gap 17.0H    CBC 3.3, Hb 9.6, Hct 31.3, Plt 115    Lab Results   Component Value Date/Time    HBA1C 4.9 06/07/2018 02:29 AM        Imaging:   I reviewed the following radiology reports reviewed  GAYATHRI 08/20/2019  Echocardiography Laboratory  CONCLUSIONS  LV EF    65%.  Structurally normal tricuspid valve.  Trace tricuspid regurgitation.  Port catheter noted in the SVC and right atrium.  Tip support appears bright, no obvious vegetation.  The tip of the port abuts the tricuspid valve.  Recommend the port be pulled back approximately 2 cm.  No evidence of endocarditis.    Xray hip with pelvis-left 07/28/2019  Left femoral head heterogeneous density is compatible with known diagnosis of avascular necrosis. There is no joint space narrowing or spurring    Right arthroplasty without evident complication.    Xray left foot 01/21/2019  Nondisplaced transverse fracture through the base of the fifth metatarsal.    MRI lumbar spine 1/1/19  1.  Diffusely decreased signal again seen throughout the marrow space consistent with red marrow conversion, likely secondary to chronic anemia.  2.  No significant disc bulging, central canal narrowing, or foraminal narrowing.  3.  No lumbar spine fracture or subluxation.    MRI brain 12/13/2018  1.  There is no hippocampal sclerosis.  2.  There is no evidence of cortical dysplasia or neuronal migrational abnormalities.  3.  A few nonspecific T2 hyperintensities in the supratentorial white matter likely representing nonspecific foci of gliosis, chronic ischemia and demyelination. This is unchanged since the previous MRI dated 2/23/2012.       Chest xray 06/07/2018: Minimal right-sided opacities as described above,  suggesting pneumonia.               Results for orders placed during the hospital encounter of 07/19/17   MR-LUMBAR SPINE-W/O    Impression 1.  Diffuse decreased bone marrow signal intensity throughout the lumbar spine and sacrum, presumably secondary to chronic anemia.    2.  Otherwise unremarkable MRI scan of the lumbar spine without contrast.                                    DIAGNOSIS   Visit Diagnoses     ICD-10-CM   1. Peripheral polyneuropathy  G62.9   2. Fibromyalgia  M79.7   3. ESRD (end stage renal disease) on dialysis ([facility])  N18.6    Z99.2   4. Chronic, continuous use of opioids  F11.90   5. Avascular necrosis of bone of hip, left ([facility])  M87.052   6. Chronic bilateral low back pain without sciatica  M54.5    G89.29   7. Thoracolumbar back pain  M54.5    M54.6   8. Muscle spasm  M62.838   9. Myalgia  M79.10   10. Right knee pain, unspecified chronicity  M25.561         ASSESSMENT and PLAN:     Debby Carrizales 40 y.o. Female returns for follow-up of her chronic pain related to lupus, AVN hips, low back and peripheral neuropathy    Debby was seen today for follow-up.    Diagnoses and all orders for this visit:    Peripheral polyneuropathy  -     Oxycodone HCl 20 MG Tab; Take 1 tablet by mouth 4 times a day as needed (pain) for up to 28 days.  -     pregabalin (LYRICA) 50 MG capsule; Take 1 capsule by mouth 2 times a day for 30 days.  -     Consent for Opiate Prescription  -     Controlled Substance Treatment Agreement    Fibromyalgia  -     pregabalin (LYRICA) 50 MG capsule; Take 1 capsule by mouth 2 times a day for 30 days.    ESRD (end stage renal disease) on dialysis ([facility])    Chronic, continuous use of opioids    Avascular necrosis of bone of hip, left ([facility])  -     Oxycodone HCl 20 MG Tab; Take 1 tablet by mouth 4 times a day as needed (pain) for up to 28 days.  -     pregabalin (LYRICA) 50 MG capsule; Take 1 capsule by mouth 2 times a day for 30 days.  -     Consent for Opiate Prescription  -    "  Controlled Substance Treatment Agreement    Chronic bilateral low back pain without sciatica  -     Oxycodone HCl 20 MG Tab; Take 1 tablet by mouth 4 times a day as needed (pain) for up to 28 days.  -     Consent for Opiate Prescription  -     Controlled Substance Treatment Agreement    Thoracolumbar back pain  -     REFERRAL TO OUTPATIENT INTERVENTIONAL PAIN CLINIC    Muscle spasm  -     REFERRAL TO OUTPATIENT INTERVENTIONAL PAIN CLINIC    Myalgia  -     REFERRAL TO OUTPATIENT INTERVENTIONAL PAIN CLINIC    Right knee pain, unspecified chronicity         1. Right knee, discussed ongoing issue and she will try to use a brace with patellar cut out, for now.  2. Discussed lyrica, will continue 50mg po bid.  Refilled today.  No change to dose.  3.  reviewed.  UDS reviewed on 01/08/2021.  Continue oxycodone.  Plan for #102 for the next 28 days.  Script given for this month.   4. Follow-up with PCP and specialists, she will follow-up regarding catheter and possible transition to peritoneal dialysis.  5. Discussed trigger point injections for thoracolumbar paraspinals bilaterally.  The risks benefits and alternatives to this procedure were discussed and the patient wishes to proceed with the procedure. Risks include but are not limited to damage to surrounding structures, infection, bleeding, worsening of pain which can be permanent, weakness which can be permanent. Benefits include pain relief, improved function. Alternatives includes not doing the procedure.    6. Continue activity as tolerated.     In prescribing controlled substances to this patient, I certify that I have obtained and reviewed the medical history of Debby Carrizales. I have also made a good ly effort to obtain applicable records from other providers who have treated the patient and records demonstrating the following: report of \"drug-seeking behavior,\" although I suspect that she has organic reasons for pain and will continue to monitor " as appropriate.     I have conducted a physical exam and documented it. I have reviewed Ms. Carrizales’s prescription history as maintained by the [place] Prescription Monitoring Program.     I have assessed the patient’s risk for abuse, dependency, and addiction using the validated Opioid Risk Tool available at https://www.mdcalc.com/evwfwc-racf-ehfz-ort-narcotic-abuse.     Given the above, I believe the benefits of controlled substance therapy outweigh the risks. The reasons for prescribing controlled substances include non-narcotic, oral analgesic alternatives have been inadequate for pain control and in my professional opinion, controlled substances are the only reasonable choice for this patient because she has limitations to medication choices due to being on dialysis.   Accordingly, I have discussed the risk and benefits, treatment plan, and alternative therapies with the patient.     Follow up: 4 weeks or sooner for trigger point injections.    Please note that this dictation was created using voice recognition software. I have made every reasonable attempt to correct obvious errors but there may be errors of grammar and content that I may have overlooked prior to finalization of this note.      Quinn Chandler MD  Interventional Spine and Sports Physiatry  Physical Medicine and Rehabilitation  [facility]

## 2021-04-01 ENCOUNTER — HOSPITAL ENCOUNTER (EMERGENCY)
Facility: MEDICAL CENTER | Age: 41
End: 2021-04-01
Attending: EMERGENCY MEDICINE
Payer: MEDICARE

## 2021-04-01 VITALS
DIASTOLIC BLOOD PRESSURE: 102 MMHG | BODY MASS INDEX: 29.2 KG/M2 | HEIGHT: 65 IN | HEART RATE: 72 BPM | WEIGHT: 175.27 LBS | OXYGEN SATURATION: 97 % | SYSTOLIC BLOOD PRESSURE: 139 MMHG | TEMPERATURE: 98.6 F | RESPIRATION RATE: 16 BRPM

## 2021-04-01 DIAGNOSIS — M62.838 MUSCLE SPASM: ICD-10-CM

## 2021-04-01 LAB
ALBUMIN SERPL BCP-MCNC: 4 G/DL (ref 3.2–4.9)
ALBUMIN/GLOB SERPL: 1.5 G/DL
ALP SERPL-CCNC: 95 U/L (ref 30–99)
ALT SERPL-CCNC: 9 U/L (ref 2–50)
ANION GAP SERPL CALC-SCNC: 13 MMOL/L (ref 7–16)
AST SERPL-CCNC: 12 U/L (ref 12–45)
BASOPHILS # BLD AUTO: 1.5 % (ref 0–1.8)
BASOPHILS # BLD: 0.05 K/UL (ref 0–0.12)
BILIRUB SERPL-MCNC: 0.3 MG/DL (ref 0.1–1.5)
BUN SERPL-MCNC: 17 MG/DL (ref 8–22)
CALCIUM SERPL-MCNC: 8.7 MG/DL (ref 8.4–10.2)
CHLORIDE SERPL-SCNC: 98 MMOL/L (ref 96–112)
CO2 SERPL-SCNC: 29 MMOL/L (ref 20–33)
CREAT SERPL-MCNC: 4.86 MG/DL (ref 0.5–1.4)
EOSINOPHIL # BLD AUTO: 0.06 K/UL (ref 0–0.51)
EOSINOPHIL NFR BLD: 1.8 % (ref 0–6.9)
ERYTHROCYTE [DISTWIDTH] IN BLOOD BY AUTOMATED COUNT: 38.1 FL (ref 35.9–50)
GLOBULIN SER CALC-MCNC: 2.6 G/DL (ref 1.9–3.5)
GLUCOSE SERPL-MCNC: 73 MG/DL (ref 65–99)
HCT VFR BLD AUTO: 28.6 % (ref 37–47)
HGB BLD-MCNC: 9.8 G/DL (ref 12–16)
IMM GRANULOCYTES # BLD AUTO: 0.01 K/UL (ref 0–0.11)
IMM GRANULOCYTES NFR BLD AUTO: 0.3 % (ref 0–0.9)
LYMPHOCYTES # BLD AUTO: 0.92 K/UL (ref 1–4.8)
LYMPHOCYTES NFR BLD: 27.6 % (ref 22–41)
MCH RBC QN AUTO: 31.8 PG (ref 27–33)
MCHC RBC AUTO-ENTMCNC: 34.3 G/DL (ref 33.6–35)
MCV RBC AUTO: 92.9 FL (ref 81.4–97.8)
MONOCYTES # BLD AUTO: 0.47 K/UL (ref 0–0.85)
MONOCYTES NFR BLD AUTO: 14.1 % (ref 0–13.4)
NEUTROPHILS # BLD AUTO: 1.82 K/UL (ref 2–7.15)
NEUTROPHILS NFR BLD: 54.7 % (ref 44–72)
NRBC # BLD AUTO: 0 K/UL
NRBC BLD-RTO: 0 /100 WBC
PLATELET # BLD AUTO: 117 K/UL (ref 164–446)
PMV BLD AUTO: 9.5 FL (ref 9–12.9)
POTASSIUM SERPL-SCNC: 4.4 MMOL/L (ref 3.6–5.5)
PROT SERPL-MCNC: 6.6 G/DL (ref 6–8.2)
RBC # BLD AUTO: 3.08 M/UL (ref 4.2–5.4)
SODIUM SERPL-SCNC: 140 MMOL/L (ref 135–145)
WBC # BLD AUTO: 3.3 K/UL (ref 4.8–10.8)

## 2021-04-01 PROCEDURE — 80053 COMPREHEN METABOLIC PANEL: CPT

## 2021-04-01 PROCEDURE — 96374 THER/PROPH/DIAG INJ IV PUSH: CPT

## 2021-04-01 PROCEDURE — 36415 COLL VENOUS BLD VENIPUNCTURE: CPT

## 2021-04-01 PROCEDURE — 96375 TX/PRO/DX INJ NEW DRUG ADDON: CPT

## 2021-04-01 PROCEDURE — 85025 COMPLETE CBC W/AUTO DIFF WBC: CPT

## 2021-04-01 PROCEDURE — 99284 EMERGENCY DEPT VISIT MOD MDM: CPT

## 2021-04-01 PROCEDURE — 700111 HCHG RX REV CODE 636 W/ 250 OVERRIDE (IP): Performed by: EMERGENCY MEDICINE

## 2021-04-01 RX ORDER — ONDANSETRON 2 MG/ML
4 INJECTION INTRAMUSCULAR; INTRAVENOUS ONCE
Status: COMPLETED | OUTPATIENT
Start: 2021-04-01 | End: 2021-04-01

## 2021-04-01 RX ORDER — HYDROMORPHONE HYDROCHLORIDE 1 MG/ML
1 INJECTION, SOLUTION INTRAMUSCULAR; INTRAVENOUS; SUBCUTANEOUS ONCE
Status: COMPLETED | OUTPATIENT
Start: 2021-04-01 | End: 2021-04-01

## 2021-04-01 RX ADMIN — HYDROMORPHONE HYDROCHLORIDE 1 MG: 1 INJECTION, SOLUTION INTRAMUSCULAR; INTRAVENOUS; SUBCUTANEOUS at 01:14

## 2021-04-01 RX ADMIN — ONDANSETRON 4 MG: 2 INJECTION INTRAMUSCULAR; INTRAVENOUS at 01:14

## 2021-04-01 ASSESSMENT — FIBROSIS 4 INDEX: FIB4 SCORE: 1.11

## 2021-04-01 ASSESSMENT — PAIN DESCRIPTION - PAIN TYPE: TYPE: ACUTE PAIN

## 2021-04-01 NOTE — ED NOTES
Patient is stable for discharge at this time, anticipatory guidance provided, close follow-up is encouraged, and ED return instructions have been detailed. Patient is both agreeable to the disposition and plan and discharged home in ambulatory state and in good condition.

## 2021-04-01 NOTE — ED TRIAGE NOTES
"Chief Complaint   Patient presents with   • Back Pain     c/o lower back pain during HD around 1830 last NOC     Pt ambulated to ED by self, c/o low back pain started during HD last NOC.    BP (!) 164/126   Pulse 71   Temp 37.3 °C (99.2 °F)   Resp 20   Ht 1.651 m (5' 5\")   Wt 79.5 kg (175 lb 4.3 oz)   LMP 03/01/2021   SpO2 97%   BMI 29.17 kg/m²     "

## 2021-04-01 NOTE — ED PROVIDER NOTES
ED Provider Note    CHIEF COMPLAINT  Chief Complaint   Patient presents with   • Back Pain     c/o lower back pain during HD around 1830 last NOC       HPI  Debby Carrizales is a 40 y.o. female who presents with chief complaint of back pain that began while she was in dialysis.  Per patient this is a relatively frequent issue for her whenever she has dialysis.  She has had come to the emergency department for this in the past.  Patient reports back pain is diffuse.  Patient denies any radiation of pain down her legs, she denies any saddle anesthesias or bowel or bladder incontinence.  Patient still makes a very small amount of urine.  Patient denies any fevers greater than 100.4.  Patient reports a history of fibromyalgia and chronic pain.  She has narcotic pain medication at home and took this after her dialysis but her pain was persistent therefore came to the emergency department.  She reports that with her pain is severe she gets nauseous and she reports she is mildly nauseous now.  Patient denies any new weakness or numbness.    REVIEW OF SYSTEMS  ROS    See HPI for further details. All other systems are negative.     PAST MEDICAL HISTORY   has a past medical history of Arthritis, Avascular necrosis of bones of both hips ([facility]) (10/10/2016), Clostridium difficile colitis (5/3/2011), Dialysis patient, ESBL (extended spectrum beta-lactamase) producing bacteria infection (2014), Fibromyalgia, High anion gap metabolic acidosis (2011), Hypertension, Lupus ([facility]), Pneumonia (), Psychiatric disorder, Pyelonephritis (2017), Renal failure, Sepsis due to pneumonia ([facility]) (2018), and Status epilepticus ([facility]) (2018).    SOCIAL HISTORY  Social History     Tobacco Use   • Smoking status: Current Every Day Smoker     Packs/day: 0.50     Years: 18.00     Pack years: 9.00     Types: Cigarettes     Start date:      Last attempt to quit: 2011     Years since quittin.8   •  Smokeless tobacco: Never Used   • Tobacco comment: started at 15   Substance and Sexual Activity   • Alcohol use: No     Alcohol/week: 0.0 oz   • Drug use: No     Types: Marijuana   • Sexual activity: Not Currently     Partners: Male       SURGICAL HISTORY   has a past surgical history that includes gastroscopy-endo (4/10/2011); sclerotheraphy (4/10/2011); gastroscopy-endo (4/18/2011); colonoscopy with biopsy (4/20/2011); gastroscopy-endo (4/27/2011); other (5/2011); other abdominal surgery; av fistula creation (9/9/2014); cath placement (9/9/2014); av fistula creation (11/14/2014); av fistula creation (2/3/2015); hip arthroplasty total (Right, 1/18/2016); closed reduction (Right, 7/5/2016); av fistula creation (Right, 7/12/2016); thrombectomy (Right, 8/20/2016); thrombectomy (Right, 8/21/2016); angioplasty balloon (8/21/2016); tracheostomy; av fistula creation (Right, 12/9/2017); thrombectomy (12/9/2017); av fistula revision (Right, 8/11/2018); av fistula thrombolysis (Right, 8/11/2018); tahir (N/A, 8/20/2019); thrombectomy (Right, 12/7/2020); and cath placement (Left, 12/7/2020).    CURRENT MEDICATIONS  Home Medications    **Home medications have not yet been reviewed for this encounter**         ALLERGIES  Allergies   Allergen Reactions   • Lorazepam [Ativan] Anxiety     Patient becomes severely paranoid and agitated   • Morphine Itching     Tolerates Dilaudid   • Seasonal Runny Nose and Itching     Hay fever, sabiha brush       PHYSICAL EXAM  Vitals:    04/01/21 0009   BP: (!) 164/126   Pulse: 71   Resp: 20   Temp: 37.3 °C (99.2 °F)   SpO2: 97%       Physical Exam   Constitutional: She is oriented to person, place, and time. She appears well-developed and well-nourished.   HENT:   Head: Normocephalic and atraumatic.   Eyes: Conjunctivae are normal.   Cardiovascular: Normal rate and regular rhythm.   Pulmonary/Chest: Effort normal and breath sounds normal.   Abdominal: Soft. Bowel sounds are normal. She exhibits no  distension. There is no abdominal tenderness. There is no rebound.   Musculoskeletal:      Cervical back: Normal range of motion and neck supple.      Comments: No midline tenderness of cervical thoracic or lumbar spine, no bony abnormality bilateral lower extremity strength is 5 out of 5, sensation intact throughout   Neurological: She is alert and oriented to person, place, and time.   Skin: Skin is warm and dry. No rash noted.   Psychiatric: She has a normal mood and affect. Her behavior is normal.         DIAGNOSTIC STUDIES / PROCEDURES      LABS  Results for orders placed or performed during the hospital encounter of 04/01/21   CBC WITH DIFFERENTIAL   Result Value Ref Range    WBC 3.3 (L) 4.8 - 10.8 K/uL    RBC 3.08 (L) 4.20 - 5.40 M/uL    Hemoglobin 9.8 (L) 12.0 - 16.0 g/dL    Hematocrit 28.6 (L) 37.0 - 47.0 %    MCV 92.9 81.4 - 97.8 fL    MCH 31.8 27.0 - 33.0 pg    MCHC 34.3 33.6 - 35.0 g/dL    RDW 38.1 35.9 - 50.0 fL    Platelet Count 117 (L) 164 - 446 K/uL    MPV 9.5 9.0 - 12.9 fL    Neutrophils-Polys 54.70 44.00 - 72.00 %    Lymphocytes 27.60 22.00 - 41.00 %    Monocytes 14.10 (H) 0.00 - 13.40 %    Eosinophils 1.80 0.00 - 6.90 %    Basophils 1.50 0.00 - 1.80 %    Immature Granulocytes 0.30 0.00 - 0.90 %    Nucleated RBC 0.00 /100 WBC    Neutrophils (Absolute) 1.82 (L) 2.00 - 7.15 K/uL    Lymphs (Absolute) 0.92 (L) 1.00 - 4.80 K/uL    Monos (Absolute) 0.47 0.00 - 0.85 K/uL    Eos (Absolute) 0.06 0.00 - 0.51 K/uL    Baso (Absolute) 0.05 0.00 - 0.12 K/uL    Immature Granulocytes (abs) 0.01 0.00 - 0.11 K/uL    NRBC (Absolute) 0.00 K/uL   CMP   Result Value Ref Range    Sodium 140 135 - 145 mmol/L    Potassium 4.4 3.6 - 5.5 mmol/L    Chloride 98 96 - 112 mmol/L    Co2 29 20 - 33 mmol/L    Anion Gap 13.0 7.0 - 16.0    Glucose 73 65 - 99 mg/dL    Bun 17 8 - 22 mg/dL    Creatinine 4.86 (H) 0.50 - 1.40 mg/dL    Calcium 8.7 8.4 - 10.2 mg/dL    AST(SGOT) 12 12 - 45 U/L    ALT(SGPT) 9 2 - 50 U/L    Alkaline  Phosphatase 95 30 - 99 U/L    Total Bilirubin 0.3 0.1 - 1.5 mg/dL    Albumin 4.0 3.2 - 4.9 g/dL    Total Protein 6.6 6.0 - 8.2 g/dL    Globulin 2.6 1.9 - 3.5 g/dL    A-G Ratio 1.5 g/dL   ESTIMATED GFR   Result Value Ref Range    GFR If  12 (A) >60 mL/min/1.73 m 2    GFR If Non African American 10 (A) >60 mL/min/1.73 m 2         COURSE & MEDICAL DECISION MAKING  Pertinent Labs & Imaging studies reviewed. (See chart for details)    Patient here with symptoms that appear most consistent with exacerbation of chronic back pain.  This does appear to be a chronic and recurrent issue following dialysis.  She did not have any midline tenderness or evidence of any neurologic sequelae at this point to suggest acute cord compression.  Her vitals are reassuring outside of her hypertension which would be consistent with patient's longstanding ESRD.  Will check basic labs given the patient is ESRD and I would like to ensure that she does not have any emergent issues on her chemistry.  I have very low suspicion of epidural abscess or hematoma.  Will give Dilaudid for pain and reassess  Following Dilaudid patient reports her pain has improved significantly.  She remains within unremarkable exam.  Her labs are consistent with ESRD but no emergent need for dialysis.  Patient satting normally.  Vitals are reassuring.     The patient will return for worsening symptoms and is stable at the time of discharge. The patient verbalizes understanding and will comply.    FINAL IMPRESSION    1. Muscle spasm               Electronically signed by: Neftaly Khan M.D., 4/1/2021 12:50 AM

## 2021-04-02 ENCOUNTER — OFFICE VISIT (OUTPATIENT)
Dept: PHYSICAL MEDICINE AND REHAB | Facility: MEDICAL CENTER | Age: 41
End: 2021-04-02
Payer: MEDICARE

## 2021-04-02 VITALS
DIASTOLIC BLOOD PRESSURE: 82 MMHG | TEMPERATURE: 98.2 F | HEART RATE: 96 BPM | OXYGEN SATURATION: 93 % | HEIGHT: 65 IN | BODY MASS INDEX: 30.16 KG/M2 | WEIGHT: 181 LBS | SYSTOLIC BLOOD PRESSURE: 128 MMHG

## 2021-04-02 DIAGNOSIS — M79.10 MYALGIA: ICD-10-CM

## 2021-04-02 DIAGNOSIS — F11.90 CHRONIC, CONTINUOUS USE OF OPIOIDS: ICD-10-CM

## 2021-04-02 DIAGNOSIS — M62.838 MUSCLE SPASM: ICD-10-CM

## 2021-04-02 DIAGNOSIS — M79.7 FIBROMYALGIA: ICD-10-CM

## 2021-04-02 DIAGNOSIS — N18.6 END STAGE RENAL DISEASE (HCC): ICD-10-CM

## 2021-04-02 DIAGNOSIS — M54.50 THORACOLUMBAR BACK PAIN: ICD-10-CM

## 2021-04-02 DIAGNOSIS — M54.50 CHRONIC BILATERAL LOW BACK PAIN WITHOUT SCIATICA: ICD-10-CM

## 2021-04-02 DIAGNOSIS — G62.9 PERIPHERAL POLYNEUROPATHY: ICD-10-CM

## 2021-04-02 DIAGNOSIS — M54.6 THORACOLUMBAR BACK PAIN: ICD-10-CM

## 2021-04-02 DIAGNOSIS — G89.29 CHRONIC BILATERAL LOW BACK PAIN WITHOUT SCIATICA: ICD-10-CM

## 2021-04-02 DIAGNOSIS — M87.052 AVASCULAR NECROSIS OF BONE OF HIP, LEFT (HCC): ICD-10-CM

## 2021-04-02 PROCEDURE — 99214 OFFICE O/P EST MOD 30 MIN: CPT | Mod: 25 | Performed by: PHYSICAL MEDICINE & REHABILITATION

## 2021-04-02 PROCEDURE — 20553 NJX 1/MLT TRIGGER POINTS 3/>: CPT | Performed by: PHYSICAL MEDICINE & REHABILITATION

## 2021-04-02 RX ORDER — OXYCODONE HYDROCHLORIDE 20 MG/1
1 TABLET ORAL 4 TIMES DAILY PRN
Qty: 102 TABLET | Refills: 0 | Status: SHIPPED | OUTPATIENT
Start: 2021-04-06 | End: 2021-04-22 | Stop reason: SDUPTHER

## 2021-04-02 RX ORDER — PREGABALIN 50 MG/1
50 CAPSULE ORAL 2 TIMES DAILY
Qty: 60 CAPSULE | Refills: 0 | Status: SHIPPED | OUTPATIENT
Start: 2021-04-03 | End: 2021-04-22 | Stop reason: SDUPTHER

## 2021-04-02 ASSESSMENT — FIBROSIS 4 INDEX: FIB4 SCORE: 1.3

## 2021-04-02 ASSESSMENT — PATIENT HEALTH QUESTIONNAIRE - PHQ9: CLINICAL INTERPRETATION OF PHQ2 SCORE: 0

## 2021-04-02 ASSESSMENT — PAIN SCALES - GENERAL: PAINLEVEL: 7=MODERATE-SEVERE PAIN

## 2021-04-02 NOTE — PROGRESS NOTES
"Follow up patient note  Pain Medicine, Interventional spine and sports physiatry, Physical medicine rehabilitation      Chief complaint:   Diffuse joint and body pain, low back, hips and legs      HISTORY      HPI  Patient identification/Interval history:   Debby Carrizales 40 y.o. female who has chronic pain related to rheumatologic disease, peripheral neuropathy and AVN hips, lupus.      Debby went to the ED for severe back pain on 04/01/2021.  She reports that her usual medications that help her tolerate the back pain she feels during dialysis was severe and did not resolve with her usual pain regimen.  She had nausea at the time, felt fatigued and sick.  The patient thinks that she might have had a \"stomach bug\"  This was treated in the ED and she was discharged in good condition.    Pain is better today, but still worse.  No significant GI symptoms today.  Pain is a 7/10 on the NRS.    Baseline symptoms are aching and burning with lower extremity pain.  Lyrica also helps.  She takes     Tolerating Dialysis is painful, but she has been able to tolerate it with the use of her pain medications.  She is usually taking 1/2 pill of percocet less on non-dialysis days.  Bowel movements have been regular    ORT: 3  PHQ-9:0    Review of records:   Hospital discharge report noted from 08/12/2019-08/21/2019 admission.  She was admitted for a near syncopal episode.  Cardiac evaluation was suspicious for endocarditis and she was started on empiric IV abx.  GAYATHRI demonstrate no endocarditis, but did show that her venous catheter was pushing through the tricuspid valve.  Cultures were negative.  Dr. Jansen, vascular surgery, was consulted and they discussed follow-up plans for port management and this will be planned after discharge.    ROS   Unchanged except as in HPI     Red Flags :   Chills, Sweats:No fevers, chills and night sweats.  No fevers lately  Involuntary Weight Loss: Denies  Bowel/Bladder Incontinence: " Denies  Saddle Anesthesia: Denies    PMHx:   Past Medical History:   Diagnosis Date   • Arthritis     all joints,r/t lupus   • Avascular necrosis of bones of both hips ([facility]) 10/10/2016   • Clostridium difficile colitis 5/3/2011   • Dialysis patient    • ESBL (extended spectrum beta-lactamase) producing bacteria infection 8/25/2014   • Fibromyalgia    • High anion gap metabolic acidosis 4/2/2011   • Hypertension    • Lupus ([facility])    • Pneumonia    • Psychiatric disorder     anxiety, depression   • Pyelonephritis 11/21/2017   • Renal failure    • Sepsis due to pneumonia ([facility]) 6/7/2018   • Status epilepticus ([facility]) 12/13/2018       PSHx:   Past Surgical History:   Procedure Laterality Date   • THROMBECTOMY Right 12/7/2020    Procedure: Right upper extremity arteriovenous graft thrombectomy;  Surgeon: Daniel Poole M.D.;  Location: [facility];  Service: Vascular   • CATH PLACEMENT Left 12/7/2020    Procedure: port-a-catheter removal;  Surgeon: Daniel Poole M.D.;  Location: [facility];  Service: Vascular   • GAYATHRI N/A 8/20/2019    Procedure: ECHOCARDIOGRAM, TRANSESOPHAGEAL;  Surgeon: Marta Elaine M.D.;  Location: [facility];  Service: Cardiac   • AV FISTULA REVISION Right 8/11/2018    Procedure: AV FISTULA REVISION- Graft and Thrombectomy;  Surgeon: Shabbir Ardon M.D.;  Location: [facility];  Service: General   • AV FISTULA THROMBOLYSIS Right 8/11/2018    Procedure: AV FISTULA THROMBOLYSIS;  Surgeon: Shabbir Ardon M.D.;  Location: [facility];  Service: General   • AV FISTULA CREATION Right 12/9/2017    Procedure: AV FISTULA CREATION-ARM FOR GRAFT;  Surgeon: Lesly Jansen M.D.;  Location: [facility];  Service: General   • THROMBECTOMY  12/9/2017    Procedure: THROMBECTOMY;  Surgeon: Lesly Jansen M.D.;  Location: [facility];  Service: General   • THROMBECTOMY Right 8/21/2016    Procedure: THROMBECTOMY - right AV  fistula graft with grams;  Surgeon: Shabbir Ardon M.D.;  Location: SUR[facility]ERY [facility];  Service:    • ANGIOPLASTY BALLOON  8/21/2016    Procedure: ANGIOPLASTY BALLOON;  Surgeon: Shabbir Ardon M.D.;  Location: SURGERY [facility];  Service:    • THROMBECTOMY Right 8/20/2016    Procedure: THROMBECTOMY AV GRAFT;  Surgeon: Shabbir Ardon M.D.;  Location: SURGERY [facility];  Service:    • AV FISTULA CREATION Right 7/12/2016    Procedure: AV FISTULA CREATION WITH GRAFT BRACHIAL AXILLARY;  Surgeon: Shabbir Ardon M.D.;  Location: SURGERY [facility];  Service:    • CLOSED REDUCTION Right 7/5/2016    Procedure: CLOSED REDUCTION- Hip ;  Surgeon: Michael Holman M.D.;  Location: SURGERY [facility];  Service:    • HIP ARTHROPLASTY TOTAL Right 1/18/2016    Procedure: HIP ARTHROPLASTY TOTAL;  Surgeon: Michael Holman M.D.;  Location: [facility];  Service:    • AV FISTULA CREATION  2/3/2015    Performed by Shabbir Ardon M.D. at SURGERY [facility]   • AV FISTULA CREATION  11/14/2014    Performed by Shabbir Ardon M.D. at [facility]   • AV FISTULA CREATION  9/9/2014    Performed by Shabbir Ardon M.D. at [facility]   • CATH PLACEMENT  9/9/2014    Performed by Shabbir Ardon M.D. at [facility]   • OTHER  5/2011    tracheostomy   • GASTROSCOPY-ENDO  4/27/2011    Performed by PALMIRA DOAN at ENDOSCOPY [facility]   • COLONOSCOPY WITH BIOPSY  4/20/2011    Performed by ALCIRA CRUZ at ENDOSCOPY [facility]   • GASTROSCOPY-ENDO  4/18/2011    Performed by ALCIRA CRUZ at ENDOSCOPY [facility]   • GASTROSCOPY-ENDO  4/10/2011    Performed by SYED JURADO at [facility]   • SCLEROTHERAPHY  4/10/2011    Performed by SYED JURADO at [facility]   • OTHER ABDOMINAL SURGERY      kidney biopsy   • TRACHEOSTOMY         Family history   Family History   Problem Relation  Age of Onset   • Heart Disease Mother    • Cancer Father        Medications:   Outpatient Medications Marked as Taking for the 4/2/21 encounter (Office Visit) with Quinn Chandler M.D.   Medication Sig Dispense Refill   • pregabalin (LYRICA) 50 MG capsule Take 1 capsule by mouth 2 times a day for 30 days. 60 capsule 0   • [START ON 4/6/2021] Oxycodone HCl 20 MG Tab Take 1 tablet by mouth 4 times a day as needed (pain) for up to 28 days. 102 tablet 0   • promethazine (PHENERGAN) 25 MG Tab Take 1 Tab by mouth every 8 hours as needed for Nausea/Vomiting. 60 Tab 11   • tizanidine (ZANAFLEX) 4 MG Tab Take 2 Tabs by mouth at bedtime as needed (muscle spasms). 180 Tab 1   • traZODone (DESYREL) 50 MG Tab Take 1 Tab by mouth every bedtime. 90 Tab 1   • lisinopril (PRINIVIL) 10 MG Tab Take 1 Tab by mouth every day. On day of dialysis, take this medication after dialysis. This medication is for high blood pressure 30 Tab 2   • Naloxone (NARCAN) 4 MG/0.1ML Liquid Administer 4 mg into affected nostril(S) one time as needed (for severe sleepiness or difficulty breathing due to opioid overdose) for up to 1 dose. 2 Each 0   • VELPHORO 500 MG Chew Tab TAKE 2 TABLETS BY MOUTH THREE TIMES DAILY WITH EACH MEALS 180 Tab 3   • amLODIPine (NORVASC) 10 MG Tab Take 1 Tab by mouth every day. 90 Tab 3   • fluticasone (FLONASE) 50 MCG/ACT nasal spray SHAKE LIQUID AND USE 1 SPRAY IN EACH NOSTRIL EVERY DAY 48 g 3   • Cholecalciferol (VITAMIN D3) 64263 UNIT Cap Take 10,000 Units by mouth every day.     • docusate sodium (COLACE) 100 MG Cap Take 100 mg by mouth every day.     • omeprazole (PRILOSEC) 20 MG delayed-release capsule Take 1 Cap by mouth every day. 30 Cap 3   • ferrous sulfate 325 (65 FE) MG tablet Take 325 mg by mouth every day.     • hydroxychloroquine (PLAQUENIL) 200 MG Tab Take 200 mg by mouth every morning.         Allergies:   Allergies   Allergen Reactions   • Lorazepam [Ativan] Anxiety     Patient becomes severely paranoid and  agitated   • Morphine Itching     Tolerates Dilaudid   • Seasonal Runny Nose and Itching     Hay fever, sabiha brush       Social Hx:   Social History     Socioeconomic History   • Marital status: Legally      Spouse name: Not on file   • Number of children: Not on file   • Years of education: Not on file   • Highest education level: Not on file   Occupational History   • Not on file   Tobacco Use   • Smoking status: Current Every Day Smoker     Packs/day: 0.50     Years: 18.00     Pack years: 9.00     Types: Cigarettes     Start date:      Last attempt to quit: 2011     Years since quittin.8   • Smokeless tobacco: Never Used   • Tobacco comment: started at 15   Substance and Sexual Activity   • Alcohol use: No     Alcohol/week: 0.0 oz   • Drug use: No     Types: Marijuana   • Sexual activity: Not Currently     Partners: Male   Other Topics Concern   •  Service No   • Blood Transfusions Yes   • Caffeine Concern No   • Occupational Exposure No   • Hobby Hazards No   • Sleep Concern Yes   • Stress Concern Yes   • Weight Concern Yes   • Special Diet Yes   • Back Care No   • Exercise Yes   • Bike Helmet No   • Seat Belt Yes   • Self-Exams Yes   Social History Narrative   • Not on file     Social Determinants of Health     Financial Resource Strain: Medium Risk   • Difficulty of Paying Living Expenses: Somewhat hard   Food Insecurity: Food Insecurity Present   • Worried About Running Out of Food in the Last Year: Often true   • Ran Out of Food in the Last Year: Often true   Transportation Needs: Unmet Transportation Needs   • Lack of Transportation (Medical): Yes   • Lack of Transportation (Non-Medical): Yes   Physical Activity:    • Days of Exercise per Week:    • Minutes of Exercise per Session:    Stress:    • Feeling of Stress :    Social Connections:    • Frequency of Communication with Friends and Family:    • Frequency of Social Gatherings with Friends and Family:    • Attends Nondenominational  "Services:    • Active Member of Clubs or Organizations:    • Attends Club or Organization Meetings:    • Marital Status:    Intimate Partner Violence:    • Fear of Current or Ex-Partner:    • Emotionally Abused:    • Physically Abused:    • Sexually Abused:        EXAMINATION     Physical Exam:   Vitals: /82 (BP Location: Right arm, Patient Position: Sitting, BP Cuff Size: Small adult)   Pulse 96   Temp 36.8 °C (98.2 °F) (Temporal)   Ht 1.651 m (5' 5\")   Wt 82.1 kg (181 lb)   SpO2 93%     Constitutional:   Body Habitus: Body mass index is 30.12 kg/m².  Cooperation: Fully cooperates with exam  Appearance: Well-groomed no disheveled, in no acute distress, wearing a face mask  Respiratory-  breathing comfortable on room air, no wheezing.  Cardiovascular- no edema in the lower extremities  Psychiatric- alert and oriented ×3. Normal affect.   Spine:   Tenderness to palpation over the thoracic and lumbar paraspinals bilaterally with muscle spasms right greater than left, trapezius mild.  Functional ROM of the shoulders.  Neck supple  Gait slow, steady without loss of balance.  No use of assist device.   No focal motor deficits in the lower extremities bilaterally      MEDICAL DECISION MAKING    DATA    Labs:     UDS:  01/08/2021: positive for oxycodone and metabolites  03/12/2020 Positive for oxycodone and metabolites  12/19/2019 oxycodone and metabolites   08/22/2019 Oxycodone and metabolites (patient does not have a script for phenergan with codeine and this was negative) Therefore, consistent with medications  06/13/2019 Negative for oxycodone, positive for other oxycodone metabolites  04/16/2019 Positive for oxymorphone  01/17/2019 Oxycodone and metabolites as expected  10/02/2018 Oxycodone and metabolites, also fentanyl    01/09/2019: Hep B surface ag negative  01/08/2019: GFR 7; BUN 23 Cr 6.68, Plt 160    12/15/2018 CRP 3.03    GFR 12/06/2020 4  GFR 08/21/2019, 4  08/21/2019 WBC 4.6, Hb 10.1, Hct 31.6, " Plt 156    BMP 12/16/2018  Na 136, Potassium 4.4, chloride 96, CO2 23 Glucose 83, BUN 55H, Cr 9.44H, Calcium 9.9, Anion gap 17.0H    CBC 3.3, Hb 9.6, Hct 31.3, Plt 115    Lab Results   Component Value Date/Time    HBA1C 4.9 06/07/2018 02:29 AM        Imaging:   I reviewed the following radiology reports reviewed  GAYATHRI 08/20/2019  Echocardiography Laboratory  CONCLUSIONS  LV EF    65%.  Structurally normal tricuspid valve.  Trace tricuspid regurgitation.  Port catheter noted in the SVC and right atrium.  Tip support appears bright, no obvious vegetation.  The tip of the port abuts the tricuspid valve.  Recommend the port be pulled back approximately 2 cm.  No evidence of endocarditis.    Xray hip with pelvis-left 07/28/2019  Left femoral head heterogeneous density is compatible with known diagnosis of avascular necrosis. There is no joint space narrowing or spurring    Right arthroplasty without evident complication.    Xray left foot 01/21/2019  Nondisplaced transverse fracture through the base of the fifth metatarsal.    MRI lumbar spine 1/1/19  1.  Diffusely decreased signal again seen throughout the marrow space consistent with red marrow conversion, likely secondary to chronic anemia.  2.  No significant disc bulging, central canal narrowing, or foraminal narrowing.  3.  No lumbar spine fracture or subluxation.    MRI brain 12/13/2018  1.  There is no hippocampal sclerosis.  2.  There is no evidence of cortical dysplasia or neuronal migrational abnormalities.  3.  A few nonspecific T2 hyperintensities in the supratentorial white matter likely representing nonspecific foci of gliosis, chronic ischemia and demyelination. This is unchanged since the previous MRI dated 2/23/2012.       Chest xray 06/07/2018: Minimal right-sided opacities as described above, suggesting pneumonia.               Results for orders placed during the hospital encounter of 07/19/17   MR-LUMBAR SPINE-W/O    Impression 1.  Diffuse decreased  bone marrow signal intensity throughout the lumbar spine and sacrum, presumably secondary to chronic anemia.    2.  Otherwise unremarkable MRI scan of the lumbar spine without contrast.                                    DIAGNOSIS   Visit Diagnoses     ICD-10-CM   1. Peripheral polyneuropathy  G62.9   2. Fibromyalgia  M79.7   3. Avascular necrosis of bone of hip, left (HCC)  M87.052   4. Chronic bilateral low back pain without sciatica  M54.5    G89.29   5. End stage renal disease (HCC)  N18.6   6. Chronic, continuous use of opioids  F11.90   7. Thoracolumbar back pain  M54.5    M54.6   8. Muscle spasm  M62.838   9. Myalgia  M79.10         ASSESSMENT and PLAN:     Debby Carrizales 40 y.o. Female returns for follow-up of her chronic pain related to lupus, AVN hips, low back and peripheral neuropathy    Debby was seen today for follow-up.    Diagnoses and all orders for this visit:    Peripheral polyneuropathy  -     pregabalin (LYRICA) 50 MG capsule; Take 1 capsule by mouth 2 times a day for 30 days.  -     Oxycodone HCl 20 MG Tab; Take 1 tablet by mouth 4 times a day as needed (pain) for up to 28 days.  -     Consent for Opiate Prescription  -     Controlled Substance Treatment Agreement    Fibromyalgia  -     pregabalin (LYRICA) 50 MG capsule; Take 1 capsule by mouth 2 times a day for 30 days.    Avascular necrosis of bone of hip, left (HCC)  -     pregabalin (LYRICA) 50 MG capsule; Take 1 capsule by mouth 2 times a day for 30 days.  -     Oxycodone HCl 20 MG Tab; Take 1 tablet by mouth 4 times a day as needed (pain) for up to 28 days.  -     Consent for Opiate Prescription  -     Controlled Substance Treatment Agreement    Chronic bilateral low back pain without sciatica  -     Oxycodone HCl 20 MG Tab; Take 1 tablet by mouth 4 times a day as needed (pain) for up to 28 days.  -     Consent for Opiate Prescription  -     Controlled Substance Treatment Agreement    End stage renal disease (HCC)    Chronic,  "continuous use of opioids    Thoracolumbar back pain    Muscle spasm    Myalgia       1. Plan for trigger point injections for thoracolumbar paraspinals bilaterally.  The risks benefits and alternatives to this procedure were discussed and the patient wishes to proceed with the procedure. Risks include but are not limited to damage to surrounding structures, infection, bleeding, worsening of pain which can be permanent, weakness which can be permanent. Benefits include pain relief, improved function. Alternatives includes not doing the procedure.  See separate procedure note  2. Discussed lyrica, will continue 50mg po bid.  Refilled today.  No change to dose.  3.  reviewed.  UDS reviewed on 01/08/2021.  Continue oxycodone.  Plan for #102 for the next 28 days.  Script given for this month.   4. Follow-up with PCP and specialists, advised that she follow-up with her PCP for possible evaluation of medical cause of recent symptoms, particularly if they recur/persist.    In prescribing controlled substances to this patient, I certify that I have obtained and reviewed the medical history of Debby Carrizales. I have also made a good ly effort to obtain applicable records from other providers who have treated the patient and records demonstrating the following: report of \"drug-seeking behavior,\" although I suspect that she has organic reasons for pain and will continue to monitor as appropriate.     I have conducted a physical exam and documented it. I have reviewed Ms. Carrizales’s prescription history as maintained by the [place] Prescription Monitoring Program.     I have assessed the patient’s risk for abuse, dependency, and addiction using the validated Opioid Risk Tool available at https://www.mdcalc.com/rgmcqi-evao-aqwn-ort-narcotic-abuse.     Given the above, I believe the benefits of controlled substance therapy outweigh the risks. The reasons for prescribing controlled substances include non-narcotic, " oral analgesic alternatives have been inadequate for pain control and in my professional opinion, controlled substances are the only reasonable choice for this patient because she has limitations to medication choices due to being on dialysis.   Accordingly, I have discussed the risk and benefits, treatment plan, and alternative therapies with the patient.     Follow up: 4 weeks    Please note that this dictation was created using voice recognition software. I have made every reasonable attempt to correct obvious errors but there may be errors of grammar and content that I may have overlooked prior to finalization of this note.      Quinn Chandler MD  Interventional Spine and Sports Physiatry  Physical Medicine and Rehabilitation  [facility]

## 2021-04-04 NOTE — PROCEDURES
Date of Service: 4/2/2021    Physician/s: Quinn Chandler MD    Pre-operative Diagnosis:Thoracolumbar back pain(M54.5, M54.6), muscle spasm(M62.838),  Myalgia (M79.1)    Post-operative Diagnosis: Thoracolumbar back pain(M54.5, M54.6), muscle spasm (M62.838),  Myalgia (M79.1)    Procedure: right and left trapezius, thoracic paraspinals, lumbar paraspinals trigger point injections    Description of procedure:    The risks, benefits, and alternatives of the procedure were reviewed and discussed with the patient.  Written informed consent was freely obtained. A pre-procedural time-out was conducted by the physician verifying patient’s identity, procedure to be performed, procedure site and side, and allergy verification. Appropriate equipment was determined to be in place for the procedure.     In the office suite exam room the patient was placed in a prone position and the skin areas for injection over the right and left trapezius in one location each, thoracic paraspinals in four locations each, lumbar parasinals in one location each side were marked. The areas of pain was then prepped and draped in the usual sterile fashion. A 27g needle was placed into each of the markings at the areas above.. After negative aspiration, approximately a 0.8 ml of a solution containing 5 mL of 1% lidocaine and 5 mL of sterile saline was injected into the each areas above. The needle was removed intact after each trigger point injection, and the patient's back was covered with a 4x4 gauze, the area was cleansed with sterile normal saline, and a dressing was applied. There were no complications noted.     The patient noted some improvement in her usual back pain and was discharged in good condition    Quinn Chandler MD

## 2021-04-22 ENCOUNTER — OFFICE VISIT (OUTPATIENT)
Dept: PHYSICAL MEDICINE AND REHAB | Facility: MEDICAL CENTER | Age: 41
End: 2021-04-22
Payer: MEDICARE

## 2021-04-22 VITALS
WEIGHT: 177.47 LBS | DIASTOLIC BLOOD PRESSURE: 80 MMHG | SYSTOLIC BLOOD PRESSURE: 128 MMHG | TEMPERATURE: 98.5 F | HEIGHT: 65 IN | BODY MASS INDEX: 29.57 KG/M2 | HEART RATE: 92 BPM | OXYGEN SATURATION: 97 %

## 2021-04-22 DIAGNOSIS — N18.6 END STAGE RENAL DISEASE (HCC): ICD-10-CM

## 2021-04-22 DIAGNOSIS — G89.29 CHRONIC BILATERAL LOW BACK PAIN WITHOUT SCIATICA: ICD-10-CM

## 2021-04-22 DIAGNOSIS — G62.9 PERIPHERAL POLYNEUROPATHY: ICD-10-CM

## 2021-04-22 DIAGNOSIS — M87.052 AVASCULAR NECROSIS OF BONE OF HIP, LEFT (HCC): ICD-10-CM

## 2021-04-22 DIAGNOSIS — M54.6 THORACOLUMBAR BACK PAIN: ICD-10-CM

## 2021-04-22 DIAGNOSIS — M79.7 FIBROMYALGIA: ICD-10-CM

## 2021-04-22 DIAGNOSIS — F11.90 CHRONIC, CONTINUOUS USE OF OPIOIDS: ICD-10-CM

## 2021-04-22 DIAGNOSIS — M54.50 CHRONIC BILATERAL LOW BACK PAIN WITHOUT SCIATICA: ICD-10-CM

## 2021-04-22 DIAGNOSIS — M54.50 THORACOLUMBAR BACK PAIN: ICD-10-CM

## 2021-04-22 PROCEDURE — 99214 OFFICE O/P EST MOD 30 MIN: CPT | Performed by: PHYSICAL MEDICINE & REHABILITATION

## 2021-04-22 RX ORDER — PREGABALIN 50 MG/1
50 CAPSULE ORAL 2 TIMES DAILY
Qty: 56 CAPSULE | Refills: 0 | Status: SHIPPED | OUTPATIENT
Start: 2021-06-01 | End: 2021-06-24 | Stop reason: SDUPTHER

## 2021-04-22 RX ORDER — OXYCODONE HYDROCHLORIDE 20 MG/1
1 TABLET ORAL 4 TIMES DAILY PRN
Qty: 102 TABLET | Refills: 0 | Status: SHIPPED | OUTPATIENT
Start: 2021-06-01 | End: 2021-06-24 | Stop reason: SDUPTHER

## 2021-04-22 RX ORDER — PREGABALIN 50 MG/1
50 CAPSULE ORAL 2 TIMES DAILY
Qty: 56 CAPSULE | Refills: 0 | Status: SHIPPED | OUTPATIENT
Start: 2021-05-04 | End: 2021-06-01

## 2021-04-22 RX ORDER — OXYCODONE HYDROCHLORIDE 20 MG/1
1 TABLET ORAL 4 TIMES DAILY PRN
Qty: 102 TABLET | Refills: 0 | Status: SHIPPED | OUTPATIENT
Start: 2021-05-04 | End: 2021-06-01

## 2021-04-22 ASSESSMENT — PAIN SCALES - GENERAL: PAINLEVEL: 6=MODERATE PAIN

## 2021-04-22 ASSESSMENT — PATIENT HEALTH QUESTIONNAIRE - PHQ9: CLINICAL INTERPRETATION OF PHQ2 SCORE: 0

## 2021-04-22 ASSESSMENT — FIBROSIS 4 INDEX: FIB4 SCORE: 1.3

## 2021-04-22 NOTE — PROGRESS NOTES
Follow up patient note  Pain Medicine, Interventional spine and sports physiatry, Physical medicine rehabilitation      Chief complaint:   Diffuse joint and body pain, low back, hips and legs    HISTORY      HPI  Patient identification/Interval history:   Debby Carrizales 40 y.o. female who has chronic pain related to rheumatologic disease, peripheral neuropathy and AVN hips, lupus.      Debby reports that she is doing well since the injections on 04/02/20201.  This helped with her thoracolumbar back pain.  No severe episodes of back pain.  Her hips are aching.  Stretching helps get things going in the morning.  She is tolerating dialysis, but did miss recently after having a seizure, she thinks.  She sees Dr. Leonardo, her Neurologist, soon.    The right knee has some clicking, but only painful if stuck with knee bent, like in the movie theater for example.  Otherwise not painful.    Bowel movements are regular, she takes stool softener.  Overall pain is a 6/10 on the NRS    Lyrica dose is stable.  Tolerating Dialysis is painful, but she has been able to tolerate it with the use of her pain medications.  She is usually taking 1/2 pill of percocet less on non-dialysis days.    ORT: 3  PHQ-9:0    Review of records:   Hospital discharge report noted from 08/12/2019-08/21/2019 admission.  She was admitted for a near syncopal episode.  Cardiac evaluation was suspicious for endocarditis and she was started on empiric IV abx.  GAYATHRI demonstrate no endocarditis, but did show that her venous catheter was pushing through the tricuspid valve.  Cultures were negative.  Dr. Jansen, vascular surgery, was consulted and they discussed follow-up plans for port management and this will be planned after discharge.    ROS   Unchanged except as in HPI     Red Flags :   Chills, Sweats:No fevers, chills and night sweats.  No fevers lately  Involuntary Weight Loss: Denies  Bowel/Bladder Incontinence: Denies  Saddle Anesthesia:  Denies    PMHx:   Past Medical History:   Diagnosis Date   • Arthritis     all joints,r/t lupus   • Avascular necrosis of bones of both hips ([facility]) 10/10/2016   • Clostridium difficile colitis 5/3/2011   • Dialysis patient    • ESBL (extended spectrum beta-lactamase) producing bacteria infection 8/25/2014   • Fibromyalgia    • High anion gap metabolic acidosis 4/2/2011   • Hypertension    • Lupus ([facility])    • Pneumonia    • Psychiatric disorder     anxiety, depression   • Pyelonephritis 11/21/2017   • Renal failure    • Sepsis due to pneumonia ([facility]) 6/7/2018   • Status epilepticus ([facility]) 12/13/2018       PSHx:   Past Surgical History:   Procedure Laterality Date   • THROMBECTOMY Right 12/7/2020    Procedure: Right upper extremity arteriovenous graft thrombectomy;  Surgeon: Daniel Poole M.D.;  Location: [facility];  Service: Vascular   • CATH PLACEMENT Left 12/7/2020    Procedure: port-a-catheter removal;  Surgeon: Daniel Poole M.D.;  Location: [facility];  Service: Vascular   • GAYATHRI N/A 8/20/2019    Procedure: ECHOCARDIOGRAM, TRANSESOPHAGEAL;  Surgeon: Marta Elaine M.D.;  Location: [facility];  Service: Cardiac   • AV FISTULA REVISION Right 8/11/2018    Procedure: AV FISTULA REVISION- Graft and Thrombectomy;  Surgeon: Shabbri Ardon M.D.;  Location: [facility];  Service: General   • AV FISTULA THROMBOLYSIS Right 8/11/2018    Procedure: AV FISTULA THROMBOLYSIS;  Surgeon: Shabbir Ardon M.D.;  Location: [facility];  Service: General   • AV FISTULA CREATION Right 12/9/2017    Procedure: AV FISTULA CREATION-ARM FOR GRAFT;  Surgeon: Lesly Jansen M.D.;  Location: SUR[facility];  Service: General   • THROMBECTOMY  12/9/2017    Procedure: THROMBECTOMY;  Surgeon: Lesly Jansen M.D.;  Location: [facility];  Service: General   • THROMBECTOMY Right 8/21/2016    Procedure: THROMBECTOMY - right AV fistula graft with grams;   Surgeon: Shabbir Ardon M.D.;  Location: SURGERY [facility];  Service:    • ANGIOPLASTY BALLOON  8/21/2016    Procedure: ANGIOPLASTY BALLOON;  Surgeon: Shabbir Ardon M.D.;  Location: SURGERY [facility];  Service:    • THROMBECTOMY Right 8/20/2016    Procedure: THROMBECTOMY AV GRAFT;  Surgeon: Shabbir Ardon M.D.;  Location: SURGERY [facility];  Service:    • AV FISTULA CREATION Right 7/12/2016    Procedure: AV FISTULA CREATION WITH GRAFT BRACHIAL AXILLARY;  Surgeon: Shabbir Ardon M.D.;  Location: SURGERY [facility];  Service:    • CLOSED REDUCTION Right 7/5/2016    Procedure: CLOSED REDUCTION- Hip ;  Surgeon: Michael Holman M.D.;  Location: SURGERY [facility];  Service:    • HIP ARTHROPLASTY TOTAL Right 1/18/2016    Procedure: HIP ARTHROPLASTY TOTAL;  Surgeon: Michael Holman M.D.;  Location: [facility];  Service:    • AV FISTULA CREATION  2/3/2015    Performed by Shabbir Ardon M.D. at SURGERY [facility]   • AV FISTULA CREATION  11/14/2014    Performed by Shabbir Ardon M.D. at [facility]   • AV FISTULA CREATION  9/9/2014    Performed by Shabbir Ardon M.D. at [facility]   • CATH PLACEMENT  9/9/2014    Performed by Shabbir Ardon M.D. at [facility]   • OTHER  5/2011    tracheostomy   • GASTROSCOPY-ENDO  4/27/2011    Performed by PALMIRA DOAN at ENDOSCOPY [facility]   • COLONOSCOPY WITH BIOPSY  4/20/2011    Performed by ALCIRA CRUZ at ENDOSCOPY [facility]   • GASTROSCOPY-ENDO  4/18/2011    Performed by ALCIRA CRUZ at ENDOSCOPY [facility]   • GASTROSCOPY-ENDO  4/10/2011    Performed by SYED JURADO at [facility]   • SCLEROTHERAPHY  4/10/2011    Performed by SYED JURADO at [facility]   • OTHER ABDOMINAL SURGERY      kidney biopsy   • TRACHEOSTOMY         Family history   Family History   Problem Relation Age of Onset   • Heart  Disease Mother    • Cancer Father        Medications:   Outpatient Medications Marked as Taking for the 4/22/21 encounter (Office Visit) with Quinn Chandler M.D.   Medication Sig Dispense Refill   • [START ON 5/4/2021] Oxycodone HCl 20 MG Tab Take 1 tablet by mouth 4 times a day as needed (pain) for up to 28 days. 102 tablet 0   • [START ON 5/4/2021] pregabalin (LYRICA) 50 MG capsule Take 1 capsule by mouth 2 times a day for 28 days. 56 capsule 0   • [START ON 6/1/2021] pregabalin (LYRICA) 50 MG capsule Take 1 capsule by mouth 2 times a day for 28 days. 56 capsule 0   • [START ON 6/1/2021] Oxycodone HCl 20 MG Tab Take 1 tablet by mouth 4 times a day as needed (pain) for up to 28 days. 102 tablet 0   • promethazine (PHENERGAN) 25 MG Tab Take 1 Tab by mouth every 8 hours as needed for Nausea/Vomiting. 60 Tab 11   • tizanidine (ZANAFLEX) 4 MG Tab Take 2 Tabs by mouth at bedtime as needed (muscle spasms). 180 Tab 1   • traZODone (DESYREL) 50 MG Tab Take 1 Tab by mouth every bedtime. 90 Tab 1   • lisinopril (PRINIVIL) 10 MG Tab Take 1 Tab by mouth every day. On day of dialysis, take this medication after dialysis. This medication is for high blood pressure 30 Tab 2   • Naloxone (NARCAN) 4 MG/0.1ML Liquid Administer 4 mg into affected nostril(S) one time as needed (for severe sleepiness or difficulty breathing due to opioid overdose) for up to 1 dose. 2 Each 0   • VELPHORO 500 MG Chew Tab TAKE 2 TABLETS BY MOUTH THREE TIMES DAILY WITH EACH MEALS 180 Tab 3   • amLODIPine (NORVASC) 10 MG Tab Take 1 Tab by mouth every day. 90 Tab 3   • fluticasone (FLONASE) 50 MCG/ACT nasal spray SHAKE LIQUID AND USE 1 SPRAY IN EACH NOSTRIL EVERY DAY 48 g 3   • Cholecalciferol (VITAMIN D3) 88254 UNIT Cap Take 10,000 Units by mouth every day.     • docusate sodium (COLACE) 100 MG Cap Take 100 mg by mouth every day.     • omeprazole (PRILOSEC) 20 MG delayed-release capsule Take 1 Cap by mouth every day. 30 Cap 3   • ferrous sulfate 325 (47  FE) MG tablet Take 325 mg by mouth every day.     • hydroxychloroquine (PLAQUENIL) 200 MG Tab Take 200 mg by mouth every morning.         Allergies:   Allergies   Allergen Reactions   • Lorazepam [Ativan] Anxiety     Patient becomes severely paranoid and agitated   • Morphine Itching     Tolerates Dilaudid   • Seasonal Runny Nose and Itching     Hay fever, sabiha brush       Social Hx:   Social History     Socioeconomic History   • Marital status: Legally      Spouse name: Not on file   • Number of children: Not on file   • Years of education: Not on file   • Highest education level: Not on file   Occupational History   • Not on file   Tobacco Use   • Smoking status: Current Every Day Smoker     Packs/day: 0.50     Years: 18.00     Pack years: 9.00     Types: Cigarettes     Start date:      Last attempt to quit: 2011     Years since quittin.8   • Smokeless tobacco: Never Used   • Tobacco comment: started at 15   Substance and Sexual Activity   • Alcohol use: No     Alcohol/week: 0.0 oz   • Drug use: No     Types: Marijuana   • Sexual activity: Not Currently     Partners: Male   Other Topics Concern   •  Service No   • Blood Transfusions Yes   • Caffeine Concern No   • Occupational Exposure No   • Hobby Hazards No   • Sleep Concern Yes   • Stress Concern Yes   • Weight Concern Yes   • Special Diet Yes   • Back Care No   • Exercise Yes   • Bike Helmet No   • Seat Belt Yes   • Self-Exams Yes   Social History Narrative   • Not on file     Social Determinants of Health     Financial Resource Strain: Medium Risk   • Difficulty of Paying Living Expenses: Somewhat hard   Food Insecurity: Food Insecurity Present   • Worried About Running Out of Food in the Last Year: Often true   • Ran Out of Food in the Last Year: Often true   Transportation Needs: Unmet Transportation Needs   • Lack of Transportation (Medical): Yes   • Lack of Transportation (Non-Medical): Yes   Physical Activity:    • Days of  "Exercise per Week:    • Minutes of Exercise per Session:    Stress:    • Feeling of Stress :    Social Connections:    • Frequency of Communication with Friends and Family:    • Frequency of Social Gatherings with Friends and Family:    • Attends Gnosticism Services:    • Active Member of Clubs or Organizations:    • Attends Club or Organization Meetings:    • Marital Status:    Intimate Partner Violence:    • Fear of Current or Ex-Partner:    • Emotionally Abused:    • Physically Abused:    • Sexually Abused:        EXAMINATION     Physical Exam:   Vitals: /80 (BP Location: Right arm, Patient Position: Sitting, BP Cuff Size: Small adult)   Pulse 92   Temp 36.9 °C (98.5 °F) (Temporal)   Ht 1.651 m (5' 5\")   Wt 80.5 kg (177 lb 7.5 oz)   SpO2 97%     Constitutional:   Body Habitus: Body mass index is 29.53 kg/m².  Cooperation: Fully cooperates with exam  Appearance: Well-groomed no disheveled, in no acute distress, wearing a face mask   Respiratory-  breathing comfortable on room air, no wheezing.  Cardiovascular- no edema in the lower extremities  Psychiatric- alert and oriented ×3. Normal affect.   Spine:   No skin lesions, warmth or erythema over the injection signs  No tenderness to palpation over the thoracic and lumbar paraspinals bilaterally  Gait slow, steady without loss of balance.  No use of assist device.   No focal motor deficits in the lower extremities bilaterally      MEDICAL DECISION MAKING    DATA    Labs:     UDS:  01/08/2021: positive for oxycodone and metabolites  03/12/2020 Positive for oxycodone and metabolites  12/19/2019 oxycodone and metabolites   08/22/2019 Oxycodone and metabolites (patient does not have a script for phenergan with codeine and this was negative) Therefore, consistent with medications  06/13/2019 Negative for oxycodone, positive for other oxycodone metabolites  04/16/2019 Positive for oxymorphone  01/17/2019 Oxycodone and metabolites as expected  10/02/2018 " Oxycodone and metabolites, also fentanyl    01/09/2019: Hep B surface ag negative  01/08/2019: GFR 7; BUN 23 Cr 6.68, Plt 160    12/15/2018 CRP 3.03    GFR 12/06/2020 4  GFR 08/21/2019, 4  08/21/2019 WBC 4.6, Hb 10.1, Hct 31.6, Plt 156    BMP 12/16/2018  Na 136, Potassium 4.4, chloride 96, CO2 23 Glucose 83, BUN 55H, Cr 9.44H, Calcium 9.9, Anion gap 17.0H    CBC 3.3, Hb 9.6, Hct 31.3, Plt 115    Lab Results   Component Value Date/Time    HBA1C 4.9 06/07/2018 02:29 AM        Imaging:   I reviewed the following radiology reports reviewed  GAYATHRI 08/20/2019  Echocardiography Laboratory  CONCLUSIONS  LV EF    65%.  Structurally normal tricuspid valve.  Trace tricuspid regurgitation.  Port catheter noted in the SVC and right atrium.  Tip support appears bright, no obvious vegetation.  The tip of the port abuts the tricuspid valve.  Recommend the port be pulled back approximately 2 cm.  No evidence of endocarditis.    Xray hip with pelvis-left 07/28/2019  Left femoral head heterogeneous density is compatible with known diagnosis of avascular necrosis. There is no joint space narrowing or spurring    Right arthroplasty without evident complication.    Xray left foot 01/21/2019  Nondisplaced transverse fracture through the base of the fifth metatarsal.    MRI lumbar spine 1/1/19  1.  Diffusely decreased signal again seen throughout the marrow space consistent with red marrow conversion, likely secondary to chronic anemia.  2.  No significant disc bulging, central canal narrowing, or foraminal narrowing.  3.  No lumbar spine fracture or subluxation.    MRI brain 12/13/2018  1.  There is no hippocampal sclerosis.  2.  There is no evidence of cortical dysplasia or neuronal migrational abnormalities.  3.  A few nonspecific T2 hyperintensities in the supratentorial white matter likely representing nonspecific foci of gliosis, chronic ischemia and demyelination. This is unchanged since the previous MRI dated 2/23/2012.       Chest  xray 06/07/2018: Minimal right-sided opacities as described above, suggesting pneumonia.               Results for orders placed during the hospital encounter of 07/19/17   MR-LUMBAR SPINE-W/O    Impression 1.  Diffuse decreased bone marrow signal intensity throughout the lumbar spine and sacrum, presumably secondary to chronic anemia.    2.  Otherwise unremarkable MRI scan of the lumbar spine without contrast.                                    DIAGNOSIS   Visit Diagnoses     ICD-10-CM   1. Chronic bilateral low back pain without sciatica  M54.5    G89.29   2. Thoracolumbar back pain  M54.5    M54.6   3. Peripheral polyneuropathy  G62.9   4. Fibromyalgia  M79.7   5. Avascular necrosis of bone of hip, left (HCC)  M87.052   6. End stage renal disease (HCC)  N18.6   7. Chronic, continuous use of opioids  F11.90         ASSESSMENT and PLAN:     Debby Carrizales 40 y.o. Female returns for follow-up of her chronic pain related to lupus, AVN hips, low back and peripheral neuropathy    Debby was seen today for follow-up.    Diagnoses and all orders for this visit:    Chronic bilateral low back pain without sciatica  -     Oxycodone HCl 20 MG Tab; Take 1 tablet by mouth 4 times a day as needed (pain) for up to 28 days.  -     Oxycodone HCl 20 MG Tab; Take 1 tablet by mouth 4 times a day as needed (pain) for up to 28 days.  -     Consent for Opiate Prescription  -     Controlled Substance Treatment Agreement    Thoracolumbar back pain    Peripheral polyneuropathy  -     Oxycodone HCl 20 MG Tab; Take 1 tablet by mouth 4 times a day as needed (pain) for up to 28 days.  -     pregabalin (LYRICA) 50 MG capsule; Take 1 capsule by mouth 2 times a day for 28 days.  -     pregabalin (LYRICA) 50 MG capsule; Take 1 capsule by mouth 2 times a day for 28 days.  -     Oxycodone HCl 20 MG Tab; Take 1 tablet by mouth 4 times a day as needed (pain) for up to 28 days.  -     Consent for Opiate Prescription  -     Controlled Substance  "Treatment Agreement    Fibromyalgia  -     pregabalin (LYRICA) 50 MG capsule; Take 1 capsule by mouth 2 times a day for 28 days.  -     pregabalin (LYRICA) 50 MG capsule; Take 1 capsule by mouth 2 times a day for 28 days.    Avascular necrosis of bone of hip, left (HCC)  -     Oxycodone HCl 20 MG Tab; Take 1 tablet by mouth 4 times a day as needed (pain) for up to 28 days.  -     pregabalin (LYRICA) 50 MG capsule; Take 1 capsule by mouth 2 times a day for 28 days.  -     pregabalin (LYRICA) 50 MG capsule; Take 1 capsule by mouth 2 times a day for 28 days.  -     Oxycodone HCl 20 MG Tab; Take 1 tablet by mouth 4 times a day as needed (pain) for up to 28 days.  -     Consent for Opiate Prescription  -     Controlled Substance Treatment Agreement    End stage renal disease (HCC)    Chronic, continuous use of opioids         1. Reviewed her response to trigger point injections. She has had improvement in symptoms.  2. Continue lyrica 50mg po bid.  Refilled today. Discussed goal of continuing to reduce medication, but will hold for now.  3.  reviewed.  Most recent UDS reviewed.  Refill oxycodone #102 for the next 28 days.  Discussed that she is transitioning to peritoneal dialysis soon, we will work on starting reduction of oxycodone as able when she does not need extra pill for tolerating dialysis positioning.  Scripts given for next two months. Discussed risks of long-term opioids, particularly note of poorer outcomes for patients on dialysis.  4. Follow-up with PCP and specialists, she is anticipating going to peritoneal dialysis.    In prescribing controlled substances to this patient, I certify that I have obtained and reviewed the medical history of Debby Carrizales. I have also made a good ly effort to obtain applicable records from other providers who have treated the patient and records demonstrating the following: report of \"drug-seeking behavior,\" although I suspect that she has organic reasons " for pain and will continue to monitor as appropriate.     I have conducted a physical exam and documented it. I have reviewed Ms. Carrizales’s prescription history as maintained by the [place] Prescription Monitoring Program.     I have assessed the patient’s risk for abuse, dependency, and addiction using the validated Opioid Risk Tool available at https://www.mdcalc.com/dhomxp-tmci-lrcz-ort-narcotic-abuse.     Given the above, I believe the benefits of controlled substance therapy outweigh the risks. The reasons for prescribing controlled substances include non-narcotic, oral analgesic alternatives have been inadequate for pain control and in my professional opinion, controlled substances are the only reasonable choice for this patient because she has limitations to medication choices due to being on dialysis.   Accordingly, I have discussed the risk and benefits, treatment plan, and alternative therapies with the patient.     Follow up: 8 weeks    Please note that this dictation was created using voice recognition software. I have made every reasonable attempt to correct obvious errors but there may be errors of grammar and content that I may have overlooked prior to finalization of this note.      Quinn Chandler MD  Interventional Spine and Sports Physiatry  Physical Medicine and Rehabilitation  [facility]

## 2021-04-28 ENCOUNTER — IMMUNIZATION (OUTPATIENT)
Dept: FAMILY PLANNING/WOMEN'S HEALTH CLINIC | Facility: IMMUNIZATION CENTER | Age: 41
End: 2021-04-28
Payer: MEDICARE

## 2021-04-28 DIAGNOSIS — Z23 ENCOUNTER FOR VACCINATION: Primary | ICD-10-CM

## 2021-04-28 PROCEDURE — 91300 PFIZER SARS-COV-2 VACCINE: CPT

## 2021-04-28 PROCEDURE — 0001A PFIZER SARS-COV-2 VACCINE: CPT

## 2021-05-01 ENCOUNTER — PRE-ADMISSION TESTING (OUTPATIENT)
Dept: ADMISSIONS | Facility: MEDICAL CENTER | Age: 41
End: 2021-05-01
Attending: SURGERY
Payer: MEDICARE

## 2021-05-01 DIAGNOSIS — Z01.812 PRE-OPERATIVE LABORATORY EXAMINATION: ICD-10-CM

## 2021-05-01 LAB
COVID ORDER STATUS COVID19: NORMAL
SARS-COV-2 RNA RESP QL NAA+PROBE: NOTDETECTED
SPECIMEN SOURCE: NORMAL

## 2021-05-01 PROCEDURE — U0003 INFECTIOUS AGENT DETECTION BY NUCLEIC ACID (DNA OR RNA); SEVERE ACUTE RESPIRATORY SYNDROME CORONAVIRUS 2 (SARS-COV-2) (CORONAVIRUS DISEASE [COVID-19]), AMPLIFIED PROBE TECHNIQUE, MAKING USE OF HIGH THROUGHPUT TECHNOLOGIES AS DESCRIBED BY CMS-2020-01-R: HCPCS

## 2021-05-01 PROCEDURE — C9803 HOPD COVID-19 SPEC COLLECT: HCPCS

## 2021-05-01 PROCEDURE — U0005 INFEC AGEN DETEC AMPLI PROBE: HCPCS

## 2021-05-04 ENCOUNTER — PRE-ADMISSION TESTING (OUTPATIENT)
Dept: ADMISSIONS | Facility: MEDICAL CENTER | Age: 41
End: 2021-05-04
Attending: SURGERY
Payer: MEDICARE

## 2021-05-04 DIAGNOSIS — Z01.812 PRE-OPERATIVE LABORATORY EXAMINATION: ICD-10-CM

## 2021-05-04 LAB
ANION GAP SERPL CALC-SCNC: 13 MMOL/L (ref 7–16)
BUN SERPL-MCNC: 23 MG/DL (ref 8–22)
CALCIUM SERPL-MCNC: 8.6 MG/DL (ref 8.5–10.5)
CHLORIDE SERPL-SCNC: 94 MMOL/L (ref 96–112)
CO2 SERPL-SCNC: 29 MMOL/L (ref 20–33)
CREAT SERPL-MCNC: 7.14 MG/DL (ref 0.5–1.4)
ERYTHROCYTE [DISTWIDTH] IN BLOOD BY AUTOMATED COUNT: 41.3 FL (ref 35.9–50)
GLUCOSE SERPL-MCNC: 87 MG/DL (ref 65–99)
HCT VFR BLD AUTO: 32.2 % (ref 37–47)
HGB BLD-MCNC: 10.7 G/DL (ref 12–16)
MCH RBC QN AUTO: 32.1 PG (ref 27–33)
MCHC RBC AUTO-ENTMCNC: 33.2 G/DL (ref 33.6–35)
MCV RBC AUTO: 96.7 FL (ref 81.4–97.8)
PLATELET # BLD AUTO: 143 K/UL (ref 164–446)
PMV BLD AUTO: 10.3 FL (ref 9–12.9)
POTASSIUM SERPL-SCNC: 5.1 MMOL/L (ref 3.6–5.5)
RBC # BLD AUTO: 3.33 M/UL (ref 4.2–5.4)
SODIUM SERPL-SCNC: 136 MMOL/L (ref 135–145)
WBC # BLD AUTO: 3.5 K/UL (ref 4.8–10.8)

## 2021-05-04 PROCEDURE — 80048 BASIC METABOLIC PNL TOTAL CA: CPT

## 2021-05-04 PROCEDURE — 36415 COLL VENOUS BLD VENIPUNCTURE: CPT

## 2021-05-04 PROCEDURE — 85027 COMPLETE CBC AUTOMATED: CPT

## 2021-05-04 RX ORDER — CALCIUM CARBONATE 500 MG/1
1000 TABLET, CHEWABLE ORAL 3 TIMES DAILY
COMMUNITY
End: 2021-11-01

## 2021-05-04 ASSESSMENT — FIBROSIS 4 INDEX: FIB4 SCORE: 1.3

## 2021-05-05 ENCOUNTER — HOSPITAL ENCOUNTER (OUTPATIENT)
Facility: MEDICAL CENTER | Age: 41
End: 2021-05-05
Attending: SURGERY | Admitting: SURGERY
Payer: MEDICARE

## 2021-05-05 ENCOUNTER — ANESTHESIA EVENT (OUTPATIENT)
Dept: SURGERY | Facility: MEDICAL CENTER | Age: 41
End: 2021-05-05
Payer: MEDICARE

## 2021-05-05 ENCOUNTER — ANESTHESIA (OUTPATIENT)
Dept: SURGERY | Facility: MEDICAL CENTER | Age: 41
End: 2021-05-05
Payer: MEDICARE

## 2021-05-05 VITALS
BODY MASS INDEX: 29.38 KG/M2 | SYSTOLIC BLOOD PRESSURE: 103 MMHG | DIASTOLIC BLOOD PRESSURE: 62 MMHG | RESPIRATION RATE: 16 BRPM | WEIGHT: 176.37 LBS | TEMPERATURE: 97 F | HEIGHT: 65 IN | OXYGEN SATURATION: 93 % | HEART RATE: 78 BPM

## 2021-05-05 LAB
ANION GAP SERPL CALC-SCNC: 16 MMOL/L (ref 7–16)
BUN SERPL-MCNC: 28 MG/DL (ref 8–22)
CALCIUM SERPL-MCNC: 8.9 MG/DL (ref 8.5–10.5)
CHLORIDE SERPL-SCNC: 91 MMOL/L (ref 96–112)
CO2 SERPL-SCNC: 25 MMOL/L (ref 20–33)
CREAT SERPL-MCNC: 9.29 MG/DL (ref 0.5–1.4)
GLUCOSE SERPL-MCNC: 75 MG/DL (ref 65–99)
HCG SERPL QL: NEGATIVE
POTASSIUM SERPL-SCNC: 4.6 MMOL/L (ref 3.6–5.5)
SODIUM SERPL-SCNC: 132 MMOL/L (ref 135–145)

## 2021-05-05 PROCEDURE — 160048 HCHG OR STATISTICAL LEVEL 1-5: Performed by: SURGERY

## 2021-05-05 PROCEDURE — 160002 HCHG RECOVERY MINUTES (STAT): Performed by: SURGERY

## 2021-05-05 PROCEDURE — 700105 HCHG RX REV CODE 258: Performed by: SURGERY

## 2021-05-05 PROCEDURE — 160046 HCHG PACU - 1ST 60 MINS PHASE II: Performed by: SURGERY

## 2021-05-05 PROCEDURE — 160009 HCHG ANES TIME/MIN: Performed by: SURGERY

## 2021-05-05 PROCEDURE — 160028 HCHG SURGERY MINUTES - 1ST 30 MINS LEVEL 3: Performed by: SURGERY

## 2021-05-05 PROCEDURE — 501577 HCHG TROCAR, STEP 11MM: Performed by: SURGERY

## 2021-05-05 PROCEDURE — A9270 NON-COVERED ITEM OR SERVICE: HCPCS | Performed by: ANESTHESIOLOGY

## 2021-05-05 PROCEDURE — 700111 HCHG RX REV CODE 636 W/ 250 OVERRIDE (IP): Performed by: ANESTHESIOLOGY

## 2021-05-05 PROCEDURE — 160025 RECOVERY II MINUTES (STATS): Performed by: SURGERY

## 2021-05-05 PROCEDURE — A6402 STERILE GAUZE <= 16 SQ IN: HCPCS | Performed by: SURGERY

## 2021-05-05 PROCEDURE — 84703 CHORIONIC GONADOTROPIN ASSAY: CPT

## 2021-05-05 PROCEDURE — 501570 HCHG TROCAR, SEPARATOR: Performed by: SURGERY

## 2021-05-05 PROCEDURE — 80048 BASIC METABOLIC PNL TOTAL CA: CPT

## 2021-05-05 PROCEDURE — 700101 HCHG RX REV CODE 250: Performed by: SURGERY

## 2021-05-05 PROCEDURE — 502571 HCHG PACK, LAP CHOLE: Performed by: SURGERY

## 2021-05-05 PROCEDURE — 700101 HCHG RX REV CODE 250: Performed by: ANESTHESIOLOGY

## 2021-05-05 PROCEDURE — 700102 HCHG RX REV CODE 250 W/ 637 OVERRIDE(OP): Performed by: ANESTHESIOLOGY

## 2021-05-05 PROCEDURE — A4300 CATH IMPL VASC ACCESS PORTAL: HCPCS | Performed by: SURGERY

## 2021-05-05 PROCEDURE — A9270 NON-COVERED ITEM OR SERVICE: HCPCS | Performed by: SURGERY

## 2021-05-05 PROCEDURE — 160035 HCHG PACU - 1ST 60 MINS PHASE I: Performed by: SURGERY

## 2021-05-05 DEVICE — IMPLANTABLE DEVICE: Type: IMPLANTABLE DEVICE | Site: ABDOMEN | Status: FUNCTIONAL

## 2021-05-05 RX ORDER — SODIUM CHLORIDE 9 MG/ML
INJECTION, SOLUTION INTRAVENOUS ONCE
Status: COMPLETED | OUTPATIENT
Start: 2021-05-05 | End: 2021-05-05

## 2021-05-05 RX ORDER — OXYCODONE HCL 5 MG/5 ML
5 SOLUTION, ORAL ORAL
Status: COMPLETED | OUTPATIENT
Start: 2021-05-05 | End: 2021-05-05

## 2021-05-05 RX ORDER — BUPIVACAINE HYDROCHLORIDE AND EPINEPHRINE 5; 5 MG/ML; UG/ML
INJECTION, SOLUTION EPIDURAL; INTRACAUDAL; PERINEURAL
Status: DISCONTINUED | OUTPATIENT
Start: 2021-05-05 | End: 2021-05-05 | Stop reason: HOSPADM

## 2021-05-05 RX ORDER — ROCURONIUM BROMIDE 10 MG/ML
INJECTION, SOLUTION INTRAVENOUS PRN
Status: DISCONTINUED | OUTPATIENT
Start: 2021-05-05 | End: 2021-05-05 | Stop reason: SURG

## 2021-05-05 RX ORDER — CEFAZOLIN SODIUM 1 G/3ML
INJECTION, POWDER, FOR SOLUTION INTRAMUSCULAR; INTRAVENOUS PRN
Status: DISCONTINUED | OUTPATIENT
Start: 2021-05-05 | End: 2021-05-05 | Stop reason: SURG

## 2021-05-05 RX ORDER — MEPERIDINE HYDROCHLORIDE 25 MG/ML
25 INJECTION INTRAMUSCULAR; INTRAVENOUS; SUBCUTANEOUS
Status: DISCONTINUED | OUTPATIENT
Start: 2021-05-05 | End: 2021-05-05 | Stop reason: HOSPADM

## 2021-05-05 RX ORDER — LIDOCAINE HYDROCHLORIDE 40 MG/ML
SOLUTION TOPICAL PRN
Status: DISCONTINUED | OUTPATIENT
Start: 2021-05-05 | End: 2021-05-05 | Stop reason: SURG

## 2021-05-05 RX ORDER — ACETAMINOPHEN 500 MG
1000 TABLET ORAL ONCE
Status: COMPLETED | OUTPATIENT
Start: 2021-05-05 | End: 2021-05-05

## 2021-05-05 RX ORDER — OXYCODONE HCL 5 MG/5 ML
10 SOLUTION, ORAL ORAL
Status: COMPLETED | OUTPATIENT
Start: 2021-05-05 | End: 2021-05-05

## 2021-05-05 RX ORDER — DIPHENHYDRAMINE HYDROCHLORIDE 50 MG/ML
12.5 INJECTION INTRAMUSCULAR; INTRAVENOUS
Status: DISCONTINUED | OUTPATIENT
Start: 2021-05-05 | End: 2021-05-05 | Stop reason: HOSPADM

## 2021-05-05 RX ORDER — SODIUM CHLORIDE, SODIUM LACTATE, POTASSIUM CHLORIDE, CALCIUM CHLORIDE 600; 310; 30; 20 MG/100ML; MG/100ML; MG/100ML; MG/100ML
INJECTION, SOLUTION INTRAVENOUS CONTINUOUS
Status: DISCONTINUED | OUTPATIENT
Start: 2021-05-05 | End: 2021-05-05 | Stop reason: HOSPADM

## 2021-05-05 RX ORDER — ONDANSETRON 2 MG/ML
4 INJECTION INTRAMUSCULAR; INTRAVENOUS
Status: DISCONTINUED | OUTPATIENT
Start: 2021-05-05 | End: 2021-05-05 | Stop reason: HOSPADM

## 2021-05-05 RX ORDER — HALOPERIDOL 5 MG/ML
1 INJECTION INTRAMUSCULAR
Status: DISCONTINUED | OUTPATIENT
Start: 2021-05-05 | End: 2021-05-05 | Stop reason: HOSPADM

## 2021-05-05 RX ORDER — MIDAZOLAM HYDROCHLORIDE 1 MG/ML
1 INJECTION INTRAMUSCULAR; INTRAVENOUS
Status: DISCONTINUED | OUTPATIENT
Start: 2021-05-05 | End: 2021-05-05 | Stop reason: HOSPADM

## 2021-05-05 RX ORDER — MEPERIDINE HYDROCHLORIDE 25 MG/ML
INJECTION INTRAMUSCULAR; INTRAVENOUS; SUBCUTANEOUS PRN
Status: DISCONTINUED | OUTPATIENT
Start: 2021-05-05 | End: 2021-05-05 | Stop reason: SURG

## 2021-05-05 RX ADMIN — LIDOCAINE HYDROCHLORIDE 160 MG: 40 SOLUTION TOPICAL at 10:38

## 2021-05-05 RX ADMIN — ROCURONIUM BROMIDE 40 MG: 10 INJECTION, SOLUTION INTRAVENOUS at 10:41

## 2021-05-05 RX ADMIN — SODIUM CHLORIDE: 9 INJECTION, SOLUTION INTRAVENOUS at 09:06

## 2021-05-05 RX ADMIN — SUGAMMADEX 150 MG: 100 INJECTION, SOLUTION INTRAVENOUS at 10:59

## 2021-05-05 RX ADMIN — MEPERIDINE HYDROCHLORIDE 25 MG: 25 INJECTION INTRAMUSCULAR; INTRAVENOUS; SUBCUTANEOUS at 10:55

## 2021-05-05 RX ADMIN — ALFENTANIL HYDROCHLORIDE 1000 MCG: 500 INJECTION INTRAVENOUS at 10:38

## 2021-05-05 RX ADMIN — PROPOFOL 20 MG: 10 INJECTION, EMULSION INTRAVENOUS at 10:38

## 2021-05-05 RX ADMIN — CEFAZOLIN 2 G: 330 INJECTION, POWDER, FOR SOLUTION INTRAMUSCULAR; INTRAVENOUS at 10:35

## 2021-05-05 RX ADMIN — OXYCODONE HYDROCHLORIDE 10 MG: 5 SOLUTION ORAL at 11:09

## 2021-05-05 RX ADMIN — ACETAMINOPHEN 1000 MG: 500 TABLET ORAL at 09:06

## 2021-05-05 RX ADMIN — POVIDONE IODINE 15 ML: 100 SOLUTION TOPICAL at 09:06

## 2021-05-05 RX ADMIN — MEPERIDINE HYDROCHLORIDE 25 MG: 25 INJECTION INTRAMUSCULAR; INTRAVENOUS; SUBCUTANEOUS at 11:09

## 2021-05-05 ASSESSMENT — PAIN SCALES - GENERAL: PAIN_LEVEL: 0

## 2021-05-05 ASSESSMENT — FIBROSIS 4 INDEX: FIB4 SCORE: 1.06

## 2021-05-05 ASSESSMENT — PAIN DESCRIPTION - PAIN TYPE
TYPE: CHRONIC PAIN
TYPE: CHRONIC PAIN;SURGICAL PAIN
TYPE: SURGICAL PAIN;CHRONIC PAIN
TYPE: CHRONIC PAIN;SURGICAL PAIN

## 2021-05-05 NOTE — DISCHARGE INSTRUCTIONS
ACTIVITY: Rest and take it easy for the first 24 hours.  A responsible adult is recommended to remain with you during that time.  It is normal to feel sleepy.  We encourage you to not do anything that requires balance, judgment or coordination.    MILD FLU-LIKE SYMPTOMS ARE NORMAL. YOU MAY EXPERIENCE GENERALIZED MUSCLE ACHES, THROAT IRRITATION, HEADACHE AND/OR SOME NAUSEA.    FOR 24 HOURS DO NOT:  Drive, operate machinery or run household appliances.  Drink beer or alcoholic beverages.   Make important decisions or sign legal documents.    SPECIAL INSTRUCTIONS:   D/C instructions:    1. DIET: Upon discharge from the hospital you may resume your normal preoperative diet. Depending on how you are feeling and whether you have nausea or not, you may wish to stay with a bland diet for the first few days. However, you can advance this as quickly as you feel ready.    2. ACTIVITIES: After discharge from the hospital, you may resume full routine activities. However, there should be no heavy lifting (greater than 15 pounds) and no strenuous activities until after your follow-up visit. Otherwise, routine activities of daily living are acceptable.    3. DRIVING: You may drive whenever you are off pain medications and are able to perform the activities needed to drive, i.e. turning, bending, twisting, etc.    4. BATHING: You may get the wound wet at any time after leaving the hospital. You may shower, but do not submerge in a bath for at least a week. Dressings may come off after 48 hours. Leave steri strips on until they fall off.  It may be necessary to trim the edges.     5. BOWEL FUNCTION: Constipation is common after an operation, especially with pain medications. The combination of pain medication and decreased activity level can cause constipation in otherwise normal patients. If you feel this is occurring, take a laxative (Milk of Magnesia, Ex-Lax, Senokot, etc.) until the problem has resolved.    6. PAIN MEDICATION:  You will be given a prescription for pain medication at discharge. Please take these as directed. It is important to remember not to take medications on an empty stomach as this may cause nausea.    7.CALL IF YOU HAVE: (1) Fevers to more than 1010 F, (2) Unusual chest or leg pain, (3) Drainage or fluid from incision that may be foul smelling, increased tenderness or soreness at the wound or the wound edges are no longer together, redness or swelling at the incision site. Please do not hesitate to call with any other questions.     8. APPOINTMENT: Contact our office at [phone] for a follow-up appointment in 2 weeks following your procedure.    If you have any additional questions, please do not hesitate to call the office and speak to either myself or the physician on call.    Office address:   [id number]  Víctor Berg MD  [facility]  [phone]      DIET: To avoid nausea, slowly advance diet as tolerated, avoiding spicy or greasy foods for the first day.  Add more substantial food to your diet according to your physician's instructions.  Babies can be fed formula or breast milk as soon as they are hungry.  INCREASE FLUIDS AND FIBER TO AVOID CONSTIPATION.    FOLLOW-UP APPOINTMENT:  A follow-up appointment should be arranged with your doctor in 1-2 Weeks; call to schedule.    You should CALL YOUR PHYSICIAN if you develop:  Fever greater than 101 degrees F.  Pain not relieved by medication, or persistent nausea or vomiting.  Excessive bleeding (blood soaking through dressing) or unexpected drainage from the wound.  Extreme redness or swelling around the incision site, drainage of pus or foul smelling drainage.  Inability to urinate or empty your bladder within 8 hours.  Problems with breathing or chest pain.    You should call 911 if you develop problems with breathing or chest pain.  If you are unable to contact your doctor or surgical center, you should go to the nearest emergency  room or urgent care center.      Physician's telephone #: [phone] Dr. Berg    If any questions arise, call your doctor.  If your doctor is not available, please feel free to call the Surgical Center at [phone]. The Contact Center is open Monday through Friday 7AM to 5PM and may speak to a nurse at [phone], or toll free at [phone].     A registered nurse may call you a few days after your surgery to see how you are doing after your procedure.    MEDICATIONS: Resume taking daily medication.  Take prescribed pain medication with food.  If no medication is prescribed, you may take non-aspirin pain medication if needed.  PAIN MEDICATION CAN BE VERY CONSTIPATING.  Take a stool softener or laxative such as senokot, pericolace, or milk of magnesia if needed.    Last pain medication given at 11:09 AM.    If your physician has prescribed pain medication that includes Acetaminophen (Tylenol), do not take additional Acetaminophen (Tylenol) while taking the prescribed medication.    Depression / Suicide Risk    As you are discharged from this [facility] facility, it is important to learn how to keep safe from harming yourself.    Recognize the warning signs:  · Abrupt changes in personality, positive or negative- including increase in energy   · Giving away possessions  · Change in eating patterns- significant weight changes-  positive or negative  · Change in sleeping patterns- unable to sleep or sleeping all the time   · Unwillingness or inability to communicate  · Depression  · Unusual sadness, discouragement and loneliness  · Talk of wanting to die  · Neglect of personal appearance   · Rebelliousness- reckless behavior  · Withdrawal from people/activities they love  · Confusion- inability to concentrate     If you or a loved one observes any of these behaviors or has concerns about self-harm, here's what you can do:  · Talk about it- your feelings and reasons for harming yourself  · Remove any  means that you might use to hurt yourself (examples: pills, rope, extension cords, firearm)  · Get professional help from the community (Mental Health, Substance Abuse, psychological counseling)  · Do not be alone:Call your Safe Contact- someone whom you trust who will be there for you.  · Call your local CRISIS HOTLINE 906-6908 or 696-046-4681  · Call your local Children's Mobile Crisis Response Team [place] (164) 483-0180 or www.[place]  · Call the toll free National Suicide Prevention Hotlines   · National Suicide Prevention Lifeline 218-502-BERO (9392)  · National Hope Line Network 800-SUICIDE (332-4530)

## 2021-05-05 NOTE — ANESTHESIA PREPROCEDURE EVALUATION
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HCC)   (+) Hypertension      GI   (+) GERD (gastroesophageal reflux disease)         (+) ESRD (end stage renal disease) on dialysis (HCC)       Physical Exam    Airway   Mallampati: II  TM distance: >3 FB  Neck ROM: full       Cardiovascular - normal exam  Rhythm: regular  Rate: normal  (-) murmur     Dental - normal exam           Pulmonary - normal exam  Breath sounds clear to auscultation     Abdominal   (+) obese     Neurological - normal exam                 Anesthesia Plan    ASA 3   ASA physical status 3 criteria: other (comment) and ESRD undergoing regularly scheduled dialysis    Plan - general       Airway plan will be ETT          Induction: intravenous    Postoperative Plan: Postoperative administration of opioids is intended.    Pertinent diagnostic labs and testing reviewed    Informed Consent:    Anesthetic plan and risks discussed with patient.    Use of blood products discussed with: patient whom consented to blood products.

## 2021-05-05 NOTE — OR NURSING
@1155 PT arrived to Phase 2. Pt has complaints of 5/10 pain. Pt states this is tolerable for her. Pt has minor complaints of nausea. Pts grandma brought back to discharge area.     @1156 Discharge instructions discussed with patient at bedside. Pt verbalizes understanding.    @1210 PIV removed. PT able to dress self without assistance.    @1213 PT discharged to home with all belongings.

## 2021-05-05 NOTE — ANESTHESIA POSTPROCEDURE EVALUATION
Patient: Debby Carrizales    Procedure Summary     Date: 05/05/21 Room / Location: [facility] 09 / SURGERY [facility]    Anesthesia Start: 1035 Anesthesia Stop: 1105    Procedure: INSERTION, CATHETER, CAPD (N/A Abdomen) Diagnosis: (END-STAGE RENAL DISEASE)    Surgeons: Víctor Berg M.D. Responsible Provider: Antonio Mills M.D.    Anesthesia Type: general ASA Status: 3          Final Anesthesia Type: general  Last vitals  BP   Blood Pressure: 113/64    Temp   37.3 °C (99.2 °F)    Pulse   90   Resp   18    SpO2   93 %      Anesthesia Post Evaluation    Patient location during evaluation: PACU  Patient participation: complete - patient participated  Level of consciousness: awake and alert  Pain score: 0    Airway patency: patent  Anesthetic complications: no  Cardiovascular status: hemodynamically stable  Respiratory status: acceptable  Hydration status: euvolemic    PONV: none          No complications documented.     Nurse Pain Score: 6 (NPRS)

## 2021-05-05 NOTE — OR NURSING
1105: Pt arrives to PACU asleep and calm. VSS. Dressing to right side of abdomen is CDI.     1109: Pt c/o pain- medicated per MAR.    1130: Pts grandnataliia Hawkins called voicemail left.    1145: Pt states pain is at her baseline and tolerable at this time. Report called to Daniel FINCH. Dressing CDI upon leaving PACU.

## 2021-05-05 NOTE — ANESTHESIA TIME REPORT
Anesthesia Start and Stop Event Times     Date Time Event    5/5/2021 0912 Ready for Procedure     1035 Anesthesia Start     1105 Anesthesia Stop        Responsible Staff  05/05/21    Name Role Begin End    Antonio Mills M.D. Anesth 1035 1105        Preop Diagnosis (Free Text):  Pre-op Diagnosis     END-STAGE RENAL DISEASE        Preop Diagnosis (Codes):    Post op Diagnosis  Renal failure      Premium Reason  Non-Premium    Comments:

## 2021-05-05 NOTE — H&P
General Surgery H&P  -------------------------------------------------------------------------------------------------  Date: 5/5/2021    Surgeon: Víctor Berg M.D. - [facility]  -------------------------------------------------------------------------------------------------    HPI:  This is a 40 y.o. female who is presenting today for elective surgery.  No specific complaints at this time. Questions addressed.      Past Medical History:   Diagnosis Date   • Anemia    • Arthritis     all joints,r/t lupus   • Avascular necrosis of bones of both hips ([facility]) 10/10/2016   • Blood clotting disorder ([facility])     in AV Graft   • Clostridium difficile colitis 5/3/2011   • Dialysis patient    • ESBL (extended spectrum beta-lactamase) producing bacteria infection 8/25/2014   • Fall    • Fibromyalgia    • High anion gap metabolic acidosis 4/2/2011   • Hypertension    • Lupus ([facility])    • Pneumonia    • Psychiatric disorder     anxiety, depression   • Pyelonephritis 11/21/2017   • Renal failure    • Sepsis due to pneumonia ([facility]) 6/7/2018   • Status epilepticus ([facility]) 12/13/2018    last seizure 2 mos ago 03/2021       Past Surgical History:   Procedure Laterality Date   • THROMBECTOMY Right 12/7/2020    Procedure: Right upper extremity arteriovenous graft thrombectomy;  Surgeon: Daniel Poole M.D.;  Location: [facility];  Service: Vascular   • CATH PLACEMENT Left 12/7/2020    Procedure: port-a-catheter removal;  Surgeon: Daniel Poole M.D.;  Location: [facility];  Service: Vascular   • GAYATHRI N/A 8/20/2019    Procedure: ECHOCARDIOGRAM, TRANSESOPHAGEAL;  Surgeon: Marta Elaine M.D.;  Location: SURGERY [facility];  Service: Cardiac   • AV FISTULA REVISION Right 8/11/2018    Procedure: AV FISTULA REVISION- Graft and Thrombectomy;  Surgeon: Shabbir Ardon M.D.;  Location: SURGERY [facility];  Service: General   • AV FISTULA THROMBOLYSIS Right 8/11/2018    Procedure: AV FISTULA  THROMBOLYSIS;  Surgeon: Shabbir Ardon M.D.;  Location: SURGERY [facility];  Service: General   • AV FISTULA CREATION Right 12/9/2017    Procedure: AV FISTULA CREATION-ARM FOR GRAFT;  Surgeon: Lesly Jansen M.D.;  Location: SURGERY [facility];  Service: General   • THROMBECTOMY  12/9/2017    Procedure: THROMBECTOMY;  Surgeon: Lesly Jansen M.D.;  Location: SURGERY [facility];  Service: General   • THROMBECTOMY Right 8/21/2016    Procedure: THROMBECTOMY - right AV fistula graft with grams;  Surgeon: Shabbir Ardon M.D.;  Location: SURGERY [facility];  Service:    • ANGIOPLASTY BALLOON  8/21/2016    Procedure: ANGIOPLASTY BALLOON;  Surgeon: Shabbir Ardon M.D.;  Location: [facility];  Service:    • THROMBECTOMY Right 8/20/2016    Procedure: THROMBECTOMY AV GRAFT;  Surgeon: Shabbir Ardon M.D.;  Location: [facility];  Service:    • AV FISTULA CREATION Right 7/12/2016    Procedure: AV FISTULA CREATION WITH GRAFT BRACHIAL AXILLARY;  Surgeon: Shabbir Ardon M.D.;  Location: SURGERY [facility];  Service:    • CLOSED REDUCTION Right 7/5/2016    Procedure: CLOSED REDUCTION- Hip ;  Surgeon: Michael Holman M.D.;  Location: [facility];  Service:    • HIP ARTHROPLASTY TOTAL Right 1/18/2016    Procedure: HIP ARTHROPLASTY TOTAL;  Surgeon: Michael Holman M.D.;  Location: [facility];  Service:    • AV FISTULA CREATION  2/3/2015    Performed by Shabbir Ardon M.D. at [facility]   • AV FISTULA CREATION  11/14/2014    Performed by Shabbir Ardon M.D. at SURGERY [facility]   • AV FISTULA CREATION  9/9/2014    Performed by Shabbir Ardon M.D. at [facility]   • CATH PLACEMENT  9/9/2014    Performed by Shabbir Ardon M.D. at [facility]   • OTHER  5/2011    tracheostomy   • GASTROSCOPY-ENDO  4/27/2011    Performed by PALMIRA DOAN at ENDOSCOPY [facility]   • COLONOSCOPY WITH  BIOPSY  2011    Performed by ALCIRA CRUZ at ENDOSCOPY [facility]   • GASTROSCOPY-ENDO  2011    Performed by ALCIRA CRUZ at ENDOSCOPY [facility]   • GASTROSCOPY-ENDO  4/10/2011    Performed by SYED JURADO at ENDOSCOPY [facility]   • SCLEROTHERAPHY  4/10/2011    Performed by SYED JURADO at ENDOSCOPY [facility]   • OTHER ABDOMINAL SURGERY      kidney biopsy   • TRACHEOSTOMY         No current facility-administered medications for this encounter.       Social History     Socioeconomic History   • Marital status: Single     Spouse name: Not on file   • Number of children: Not on file   • Years of education: Not on file   • Highest education level: Not on file   Occupational History   • Not on file   Tobacco Use   • Smoking status: Current Every Day Smoker     Packs/day: 0.50     Years: 18.00     Pack years: 9.00     Types: Cigarettes     Start date:      Last attempt to quit: 2011     Years since quittin.9   • Smokeless tobacco: Never Used   • Tobacco comment: started at 15, socially only   Substance and Sexual Activity   • Alcohol use: No     Alcohol/week: 0.0 oz   • Drug use: No     Types: Marijuana   • Sexual activity: Not Currently     Partners: Male   Other Topics Concern   •  Service No   • Blood Transfusions Yes   • Caffeine Concern No   • Occupational Exposure No   • Hobby Hazards No   • Sleep Concern Yes   • Stress Concern Yes   • Weight Concern Yes   • Special Diet Yes   • Back Care No   • Exercise Yes   • Bike Helmet No   • Seat Belt Yes   • Self-Exams Yes   Social History Narrative   • Not on file     Social Determinants of Health     Financial Resource Strain: Medium Risk   • Difficulty of Paying Living Expenses: Somewhat hard   Food Insecurity: Food Insecurity Present   • Worried About Running Out of Food in the Last Year: Often true   • Ran Out of Food in the Last Year: Often true   Transportation Needs: Unmet  "Transportation Needs   • Lack of Transportation (Medical): Yes   • Lack of Transportation (Non-Medical): Yes   Physical Activity:    • Days of Exercise per Week:    • Minutes of Exercise per Session:    Stress:    • Feeling of Stress :    Social Connections:    • Frequency of Communication with Friends and Family:    • Frequency of Social Gatherings with Friends and Family:    • Attends [employer] Services:    • Active Member of Clubs or Organizations:    • Attends Club or Organization Meetings:    • Marital Status:    Intimate Partner Violence:    • Fear of Current or Ex-Partner:    • Emotionally Abused:    • Physically Abused:    • Sexually Abused:        Family History   Problem Relation Age of Onset   • Heart Disease Mother    • Cancer Father        Allergies:  Lorazepam [ativan], Morphine, and Seasonal    Review of Systems:  Noncontributory except as per HPI    Physical Exam:  /64   Pulse 90   Temp 37.3 °C (99.2 °F) (Temporal)   Resp 18   Ht 1.651 m (5' 5\")   Wt 80 kg (176 lb 5.9 oz)   SpO2 93%     Constitutional: Alert, oriented, no acute distress  HEENT:  Normocephalic and atraumatic, EOMI  Neck:   Supple, no JVD,   Cardiovascular: Regular rate and rhythm,   Pulmonary:  Good air entry bilaterally,    Abdominal:  Soft, non-tender, non-distended     Aortic impulse not widened  Musculoskeletal: No edema, no tenderness  Neurological:  CN II-XII grossly intact, no focal deficits  Skin:   Skin is warm and dry. No rash noted.  Psychiatric:  Normal mood and affect.    Labs:  Recent Labs     05/04/21  1120   WBC 3.5*   RBC 3.33*   HEMOGLOBIN 10.7*   HEMATOCRIT 32.2*   MCV 96.7   MCH 32.1   MCHC 33.2*   RDW 41.3   PLATELETCT 143*   MPV 10.3     Recent Labs     05/04/21  1120 05/05/21  0848   SODIUM 136 132*   POTASSIUM 5.1 4.6   CHLORIDE 94* 91*   CO2 29 25   GLUCOSE 87 75   BUN 23* 28*   CREATININE 7.14* 9.29*   CALCIUM 8.6 8.9               Assessment/Plan:  This is a 40 y.o. female here today for elective " surgery: laparoscopic insertion of CAPD catheter    I explained the details of the operation, alternatives, and potential risks, including but not limited to bleeding, infection, and risks of anesthesia.  All questions were answered. Patient understands and agrees to proceed.      Víctor Berg MD  [facility] (General and Vascular Surgery)  Cell: [phone] (text or call is fine, if you don't reach me please try my office)  Office: [phone]    5/5/2021    10:15 AM  ___________________________________________________________________  Patient:Debby Moon Donthu   MRN:[id number]   CSN:[id number]

## 2021-05-06 NOTE — OP REPORT
Operative Report     Patient:   Debby Carrizales  :    1980  MRN:    [id number]  [facility]:    [id number]    Date of Surgery:  2021    Surgeon:   Víctor Berg M.D.     Assistant:    Waleska SOLORZANO    Pre-operative Diagnosis:    - ESRD on dialysis    Post-operative Diagnosis:   - ESRD on dialysis    Procedure:     - laparoscopic insertion of peritoneal dialysis catheter    Anesthesia:   General plus local 0.5% Marcaine with epinephrine    Blood Loss:   Minimal    Specimen:   None    Findings:   n/a    Wound classification: Clean    Disposition:   PACU in stable condition    Justification for use of Surgical First Assist:  During this operation my assistant participated with patient preparation for surgery, incision, surgical exposure including retraction, dissection, and ligation to isolate the target structures and preserve nearby structures, and closure of the field of dissection.  The presence of the expert assist increased both the efficiency and safety of the operation, decreasing anesthesia time and decreasing the risk of intraoperative surgical complications.    Procedure in detail:  The patient was identified in the pre-operative holding area and brought to the operating room. Pre-operative antibiotics were administered prior to the procedure. Anesthesia was smoothly induced. The patient was prepped and draped in the usual sterile fashion. A surgical time out was called to identify the correct patient, procedure, and equipment.  Everyone was in agreement.    Local anesthetic was used at all port sites.  We began with a 5-mm incision in the left upper quadrant and we carefully entered the abdomen with an optical entry technique.  We then established pneumoperitoneum to a pressure of 15.  We found no evidence of intra-abdominal injury from insertion of the trocar.  There was no evidence of any inflammatory or malignant condition inside of the abdomen and everything appeared unremarkable. At  this point, we made another 1-cm incision in the right upper quadrant and we tunneled through the subcutaneous tissue down to the right lower quadrant and entered into the abdominal cavity using an 11-mm laparoscopic trocar.  We then grasped the peritoneal catheter and inserted it through this trocar down into the pelvis and the port was removed and the cuffs were positioned in the subcutaneous tract.  The catheter itself was then brought out through a small stab incision just above the incision in the right upper quadrant and then the 1-cm incision that was used for the port insertion was closed with subcuticular 4-0 Vicryl suture and skin glue.  The catheter site itself was held in place with 2-0 nylon suture and a dry sterile bandage was applied around the catheter.  We then desufflated the abdomen and we removed the 5-mm port in the left upper quadrant.  We closed the incision with subcuticular 4-0 Vicryl suture and skin glue.    Sponge and needle counts were correct at the end of the case.     The patient was awakened from anesthesia and was taken to the recovery room in stable condition.     Víctor Berg MD  General and Vascular Surgery  [facility]  Cell [phone]  Office [phone]

## 2021-05-12 NOTE — TELEPHONE ENCOUNTER
Received request via: Pharmacy    Was the patient seen in the last year in this department? Yes    Does the patient have an active prescription (recently filled or refills available) for medication(s) requested? No   Secondary Defect Length In Cm (Required For Flaps): 2

## 2021-05-13 RX ORDER — LISINOPRIL 10 MG/1
10 TABLET ORAL DAILY
Qty: 90 TABLET | Refills: 3 | Status: SHIPPED | OUTPATIENT
Start: 2021-05-13 | End: 2021-11-01

## 2021-05-13 RX ORDER — AMLODIPINE BESYLATE 10 MG/1
10 TABLET ORAL DAILY
Qty: 90 TABLET | Refills: 3 | Status: SHIPPED | OUTPATIENT
Start: 2021-05-13 | End: 2022-05-04

## 2021-05-20 ENCOUNTER — IMMUNIZATION (OUTPATIENT)
Dept: FAMILY PLANNING/WOMEN'S HEALTH CLINIC | Facility: IMMUNIZATION CENTER | Age: 41
End: 2021-05-20
Payer: MEDICARE

## 2021-05-20 DIAGNOSIS — Z23 ENCOUNTER FOR VACCINATION: Primary | ICD-10-CM

## 2021-05-20 PROCEDURE — 91300 PFIZER SARS-COV-2 VACCINE: CPT | Performed by: INTERNAL MEDICINE

## 2021-05-20 PROCEDURE — 0002A PFIZER SARS-COV-2 VACCINE: CPT | Performed by: INTERNAL MEDICINE

## 2021-06-24 ENCOUNTER — OFFICE VISIT (OUTPATIENT)
Dept: PHYSICAL MEDICINE AND REHAB | Facility: MEDICAL CENTER | Age: 41
End: 2021-06-24
Payer: MEDICARE

## 2021-06-24 VITALS
DIASTOLIC BLOOD PRESSURE: 88 MMHG | WEIGHT: 184.08 LBS | OXYGEN SATURATION: 97 % | BODY MASS INDEX: 30.67 KG/M2 | TEMPERATURE: 99 F | HEIGHT: 65 IN | SYSTOLIC BLOOD PRESSURE: 154 MMHG | HEART RATE: 94 BPM

## 2021-06-24 DIAGNOSIS — G62.9 PERIPHERAL POLYNEUROPATHY: ICD-10-CM

## 2021-06-24 DIAGNOSIS — M87.052 AVASCULAR NECROSIS OF BONE OF HIP, LEFT (HCC): ICD-10-CM

## 2021-06-24 DIAGNOSIS — G89.29 CHRONIC BILATERAL LOW BACK PAIN WITHOUT SCIATICA: ICD-10-CM

## 2021-06-24 DIAGNOSIS — M79.7 FIBROMYALGIA: ICD-10-CM

## 2021-06-24 DIAGNOSIS — M54.50 CHRONIC BILATERAL LOW BACK PAIN WITHOUT SCIATICA: ICD-10-CM

## 2021-06-24 PROCEDURE — 99214 OFFICE O/P EST MOD 30 MIN: CPT | Performed by: PHYSICAL MEDICINE & REHABILITATION

## 2021-06-24 RX ORDER — OXYCODONE HYDROCHLORIDE 20 MG/1
1 TABLET ORAL 4 TIMES DAILY PRN
Qty: 102 TABLET | Refills: 0 | Status: SHIPPED | OUTPATIENT
Start: 2021-07-27 | End: 2021-08-20 | Stop reason: SDUPTHER

## 2021-06-24 RX ORDER — PREGABALIN 50 MG/1
50 CAPSULE ORAL 2 TIMES DAILY
Qty: 56 CAPSULE | Refills: 0 | Status: SHIPPED | OUTPATIENT
Start: 2021-07-27 | End: 2021-08-20 | Stop reason: SDUPTHER

## 2021-06-24 RX ORDER — OXYCODONE HYDROCHLORIDE 20 MG/1
1 TABLET ORAL 4 TIMES DAILY PRN
Qty: 102 TABLET | Refills: 0 | Status: SHIPPED | OUTPATIENT
Start: 2021-06-29 | End: 2021-07-27

## 2021-06-24 RX ORDER — PREGABALIN 50 MG/1
50 CAPSULE ORAL 2 TIMES DAILY
Qty: 56 CAPSULE | Refills: 0 | Status: SHIPPED | OUTPATIENT
Start: 2021-06-29 | End: 2021-07-27

## 2021-06-24 ASSESSMENT — FIBROSIS 4 INDEX: FIB4 SCORE: 1.06

## 2021-06-24 ASSESSMENT — PATIENT HEALTH QUESTIONNAIRE - PHQ9
CLINICAL INTERPRETATION OF PHQ2 SCORE: 0
5. POOR APPETITE OR OVEREATING: 0 - NOT AT ALL

## 2021-06-24 ASSESSMENT — PAIN SCALES - GENERAL: PAINLEVEL: 6=MODERATE PAIN

## 2021-06-24 NOTE — PROGRESS NOTES
Follow up patient note  Pain Medicine, Interventional spine and sports physiatry, Physical medicine rehabilitation      Chief complaint:   Diffuse joint and body pain, low back, hips and legs    HISTORY      HPI  Patient identification/Interval history:   Debby Carrizales 40 y.o. female who has chronic pain related to rheumatologic disease, peripheral neuropathy and AVN hips, lupus.      Since the last visit, Debby has had a peritoneal catheter placed.  She has training upcoming for 8 hours a day for 6 weeks.    Her back pain has been a little bit worse as she has not been able to stretch in the same way due to procedure for PD catheter.  Pain due to the procedure was somewhat difficult, but she did not request more pain medication beyond script that she currently has.    She sees Dr. Leonardo, her Neurologist, soon.    Bowel movements are regular, she takes stool softener.  Lyrica 50mg po bid is helpful.  Taking percocet helps her to tolerate her dialysis.  Stable dose.    ORT: 3  PHQ-9:0    Review of records:   Hospital discharge report noted from 08/12/2019-08/21/2019 admission.  She was admitted for a near syncopal episode.  Cardiac evaluation was suspicious for endocarditis and she was started on empiric IV abx.  GAYATHRI demonstrate no endocarditis, but did show that her venous catheter was pushing through the tricuspid valve.  Cultures were negative.  Dr. Jansen, vascular surgery, was consulted and they discussed follow-up plans for port management and this will be planned after discharge.    ROS   Unchanged except as in HPI     Red Flags :   Chills, Sweats:No fevers, chills and night sweats.  No fevers lately  Involuntary Weight Loss: Denies  Bowel/Bladder Incontinence: Denies  Saddle Anesthesia: Denies    PMHx:   Past Medical History:   Diagnosis Date   • Anemia    • Arthritis     all joints,r/t lupus   • Avascular necrosis of bones of both hips (HCC) 10/10/2016   • Blood clotting disorder (HCC)     in AV  Graft   • Clostridium difficile colitis 5/3/2011   • Dialysis patient    • ESBL (extended spectrum beta-lactamase) producing bacteria infection 8/25/2014   • Fall    • Fibromyalgia    • High anion gap metabolic acidosis 4/2/2011   • Hypertension    • Lupus (HCC)    • Pneumonia    • Psychiatric disorder     anxiety, depression   • Pyelonephritis 11/21/2017   • Renal failure    • Sepsis due to pneumonia (HCC) 6/7/2018   • Status epilepticus (HCC) 12/13/2018    last seizure 2 mos ago 03/2021       PSHx:   Past Surgical History:   Procedure Laterality Date   • CATH PLACEMENT CAPD N/A 5/5/2021    Procedure: INSERTION, CATHETER, CAPD;  Surgeon: Víctor Berg M.D.;  Location: [facility];  Service: Vascular   • THROMBECTOMY Right 12/7/2020    Procedure: Right upper extremity arteriovenous graft thrombectomy;  Surgeon: Daniel Poole M.D.;  Location: [facility];  Service: Vascular   • CATH PLACEMENT Left 12/7/2020    Procedure: port-a-catheter removal;  Surgeon: Daniel Poole M.D.;  Location: [facility];  Service: Vascular   • GAYATHRI N/A 8/20/2019    Procedure: ECHOCARDIOGRAM, TRANSESOPHAGEAL;  Surgeon: Marta Elaine M.D.;  Location: [facility];  Service: Cardiac   • AV FISTULA REVISION Right 8/11/2018    Procedure: AV FISTULA REVISION- Graft and Thrombectomy;  Surgeon: Shabbir Ardon M.D.;  Location: [facility];  Service: General   • AV FISTULA THROMBOLYSIS Right 8/11/2018    Procedure: AV FISTULA THROMBOLYSIS;  Surgeon: Shabbir Ardon M.D.;  Location: [facility];  Service: General   • AV FISTULA CREATION Right 12/9/2017    Procedure: AV FISTULA CREATION-ARM FOR GRAFT;  Surgeon: Lesly Jasnen M.D.;  Location: [facility];  Service: General   • THROMBECTOMY  12/9/2017    Procedure: THROMBECTOMY;  Surgeon: Lesly Jansen M.D.;  Location: [facility];  Service: General   • THROMBECTOMY Right 8/21/2016     Procedure: THROMBECTOMY - right AV fistula graft with grams;  Surgeon: Shabbir Ardon M.D.;  Location: [facility];  Service:    • ANGIOPLASTY BALLOON  8/21/2016    Procedure: ANGIOPLASTY BALLOON;  Surgeon: Shabbir Ardon M.D.;  Location: [facility];  Service:    • THROMBECTOMY Right 8/20/2016    Procedure: THROMBECTOMY AV GRAFT;  Surgeon: Shabbir Ardon M.D.;  Location: [facility];  Service:    • AV FISTULA CREATION Right 7/12/2016    Procedure: AV FISTULA CREATION WITH GRAFT BRACHIAL AXILLARY;  Surgeon: Shabbir Ardon M.D.;  Location: [facility];  Service:    • CLOSED REDUCTION Right 7/5/2016    Procedure: CLOSED REDUCTION- Hip ;  Surgeon: Michael Holman M.D.;  Location: [facility];  Service:    • HIP ARTHROPLASTY TOTAL Right 1/18/2016    Procedure: HIP ARTHROPLASTY TOTAL;  Surgeon: Michael Holman M.D.;  Location: [facility];  Service:    • AV FISTULA CREATION  2/3/2015    Performed by Shabbir Ardon M.D. at SURGERY [facility]   • AV FISTULA CREATION  11/14/2014    Performed by Shabbir Ardon M.D. at [facility]   • AV FISTULA CREATION  9/9/2014    Performed by Shabbir Ardon M.D. at [facility]   • CATH PLACEMENT  9/9/2014    Performed by Shabbir Ardon M.D. at SURGERY [facility]   • OTHER  5/2011    tracheostomy   • GASTROSCOPY-ENDO  4/27/2011    Performed by PALMIRA DOAN at ENDOSCOPY [facility]   • COLONOSCOPY WITH BIOPSY  4/20/2011    Performed by ALCIRA CRUZ at ENDOSCOPY [facility]   • GASTROSCOPY-ENDO  4/18/2011    Performed by ALCIRA CRUZ at ENDOSCOPY [facility]   • GASTROSCOPY-ENDO  4/10/2011    Performed by SYED JURADO at [facility]   • SCLEROTHERAPHY  4/10/2011    Performed by SYED JURADO at ENDOSCOPY [facility]   • OTHER ABDOMINAL SURGERY      kidney biopsy   • TRACHEOSTOMY         Family history    Family History   Problem Relation Age of Onset   • Heart Disease Mother    • Cancer Father        Medications:   Outpatient Medications Marked as Taking for the 6/24/21 encounter (Office Visit) with Quinn Chandler M.D.   Medication Sig Dispense Refill   • [START ON 6/29/2021] pregabalin (LYRICA) 50 MG capsule Take 1 capsule by mouth 2 times a day for 28 days. 56 capsule 0   • [START ON 6/29/2021] Oxycodone HCl 20 MG Tab Take 1 tablet by mouth 4 times a day as needed (pain) for up to 28 days. 102 tablet 0   • [START ON 7/27/2021] Oxycodone HCl 20 MG Tab Take 1 tablet by mouth 4 times a day as needed (pain) for up to 28 days. 102 tablet 0   • [START ON 7/27/2021] pregabalin (LYRICA) 50 MG capsule Take 1 capsule by mouth 2 times a day for 28 days. 56 capsule 0   • amLODIPine (NORVASC) 10 MG Tab Take 1 tablet by mouth every day. 90 tablet 3   • lisinopril (PRINIVIL) 10 MG Tab Take 1 tablet by mouth every day. On day of dialysis, take this medication after dialysis. This medication is for high blood pressure 90 tablet 3   • Docusate Sodium (STOOL SOFTENER LAXATIVE PO) Take 1-2 Tablets by mouth 1 time a day as needed.     • calcium carbonate (TUMS) 500 MG Chew Tab Chew 1,000 mg 3 times a day. And after dialysis     • Cinacalcet HCl (SENSIPAR PO) Take 1 tablet by mouth 1 time a day as needed. During Dialysis      • promethazine (PHENERGAN) 25 MG Tab Take 1 Tab by mouth every 8 hours as needed for Nausea/Vomiting. 60 Tab 11   • tizanidine (ZANAFLEX) 4 MG Tab Take 2 Tabs by mouth at bedtime as needed (muscle spasms). 180 Tab 1   • traZODone (DESYREL) 50 MG Tab Take 1 Tab by mouth every bedtime. 90 Tab 1   • Naloxone (NARCAN) 4 MG/0.1ML Liquid Administer 4 mg into affected nostril(S) one time as needed (for severe sleepiness or difficulty breathing due to opioid overdose) for up to 1 dose. 2 Each 0   • VELPHORO 500 MG Chew Tab TAKE 2 TABLETS BY MOUTH THREE TIMES DAILY WITH EACH MEALS (Patient taking differently: Chew 2  Tablets 3 times a day with meals.) 180 Tab 3   • fluticasone (FLONASE) 50 MCG/ACT nasal spray SHAKE LIQUID AND USE 1 SPRAY IN EACH NOSTRIL EVERY DAY (Patient taking differently: Administer 1 Spray into affected nostril(S) every day.) 48 g 3   • lacosamide (VIMPAT) 100 MG Tab tablet Take 100 mg by mouth 2 Times a Day.     • Cholecalciferol (VITAMIN D3) 06789 UNIT Cap Take 10,000 Units by mouth every day.     • omeprazole (PRILOSEC) 20 MG delayed-release capsule Take 1 Cap by mouth every day. 30 Cap 3   • ferrous sulfate 325 (65 FE) MG tablet Take 325 mg by mouth every day.     • hydroxychloroquine (PLAQUENIL) 200 MG Tab Take 200 mg by mouth every morning.         Allergies:   Allergies   Allergen Reactions   • Lorazepam [Ativan] Anxiety and Unspecified     Patient becomes severely paranoid and agitated, hallucinates   • Morphine Itching     Tolerates Dilaudid   • Seasonal Runny Nose and Itching     Hay fever, sabiha brush       Social Hx:   Social History     Socioeconomic History   • Marital status: Single     Spouse name: Not on file   • Number of children: Not on file   • Years of education: Not on file   • Highest education level: Not on file   Occupational History   • Not on file   Tobacco Use   • Smoking status: Current Every Day Smoker     Packs/day: 0.50     Years: 18.00     Pack years: 9.00     Start date: 1995     Last attempt to quit: 6/13/2011     Years since quitting: 10.0   • Smokeless tobacco: Never Used   • Tobacco comment: vaping now   Vaping Use   • Vaping Use: Some days   • Substances: Flavoring   • Devices: Disposable   Substance and Sexual Activity   • Alcohol use: No     Alcohol/week: 0.0 oz   • Drug use: No     Types: Marijuana   • Sexual activity: Not Currently     Partners: Male   Other Topics Concern   •  Service No   • Blood Transfusions Yes   • Caffeine Concern No   • Occupational Exposure No   • Hobby Hazards No   • Sleep Concern Yes   • Stress Concern Yes   • Weight Concern Yes   •  "Special Diet Yes   • Back Care No   • Exercise Yes   • Bike Helmet No   • Seat Belt Yes   • Self-Exams Yes   Social History Narrative   • Not on file     Social Determinants of Health     Financial Resource Strain: Medium Risk   • Difficulty of Paying Living Expenses: Somewhat hard   Food Insecurity: Food Insecurity Present   • Worried About Running Out of Food in the Last Year: Often true   • Ran Out of Food in the Last Year: Often true   Transportation Needs: Unmet Transportation Needs   • Lack of Transportation (Medical): Yes   • Lack of Transportation (Non-Medical): Yes   Physical Activity:    • Days of Exercise per Week:    • Minutes of Exercise per Session:    Stress:    • Feeling of Stress :    Social Connections:    • Frequency of Communication with Friends and Family:    • Frequency of Social Gatherings with Friends and Family:    • Attends Synagogue Services:    • Active Member of Clubs or Organizations:    • Attends Club or Organization Meetings:    • Marital Status:    Intimate Partner Violence:    • Fear of Current or Ex-Partner:    • Emotionally Abused:    • Physically Abused:    • Sexually Abused:        EXAMINATION     Physical Exam:   Vitals: /88 (BP Location: Left arm, Patient Position: Sitting, BP Cuff Size: Small adult)   Pulse 94   Temp 37.2 °C (99 °F) (Temporal)   Ht 1.651 m (5' 5\")   Wt 83.5 kg (184 lb 1.4 oz)   SpO2 97%     Constitutional:   Body Habitus: Body mass index is 30.63 kg/m².  Cooperation: Fully cooperates with exam  Appearance: Well-groomed no disheveled, in no acute distress, wearing a face mask   Respiratory-  breathing comfortable on room air, no wheezing.  Cardiovascular- no edema in the lower extremities  Psychiatric- alert and oriented ×3. Normal affect.   Spine:   No skin lesions, warmth or erythema over the injection signs. PD catheter noted, abdomen  Tenderness to palpation over the thoracic and lumbar paraspinals bilaterally, right greater than the left  Gait " slow, steady without loss of balance.  No use of assist device.   No focal motor deficits in the lower extremities bilaterally      MEDICAL DECISION MAKING    DATA    Labs:     UDS:  01/08/2021: positive for oxycodone and metabolites  03/12/2020 Positive for oxycodone and metabolites  12/19/2019 oxycodone and metabolites   08/22/2019 Oxycodone and metabolites (patient does not have a script for phenergan with codeine and this was negative) Therefore, consistent with medications  06/13/2019 Negative for oxycodone, positive for other oxycodone metabolites  04/16/2019 Positive for oxymorphone  01/17/2019 Oxycodone and metabolites as expected  10/02/2018 Oxycodone and metabolites, also fentanyl    01/09/2019: Hep B surface ag negative  01/08/2019: GFR 7; BUN 23 Cr 6.68, Plt 160    12/15/2018 CRP 3.03    GFR 12/06/2020 4  GFR 08/21/2019, 4  08/21/2019 WBC 4.6, Hb 10.1, Hct 31.6, Plt 156    BMP 12/16/2018  Na 136, Potassium 4.4, chloride 96, CO2 23 Glucose 83, BUN 55H, Cr 9.44H, Calcium 9.9, Anion gap 17.0H    CBC 3.3, Hb 9.6, Hct 31.3, Plt 115    Lab Results   Component Value Date/Time    HBA1C 4.9 06/07/2018 02:29 AM        Imaging:   I reviewed the following radiology reports reviewed  GAYATHRI 08/20/2019  Echocardiography Laboratory  CONCLUSIONS  LV EF    65%.  Structurally normal tricuspid valve.  Trace tricuspid regurgitation.  Port catheter noted in the SVC and right atrium.  Tip support appears bright, no obvious vegetation.  The tip of the port abuts the tricuspid valve.  Recommend the port be pulled back approximately 2 cm.  No evidence of endocarditis.    Xray hip with pelvis-left 07/28/2019  Left femoral head heterogeneous density is compatible with known diagnosis of avascular necrosis. There is no joint space narrowing or spurring    Right arthroplasty without evident complication.    Xray left foot 01/21/2019  Nondisplaced transverse fracture through the base of the fifth metatarsal.    MRI lumbar spine  1/1/19  1.  Diffusely decreased signal again seen throughout the marrow space consistent with red marrow conversion, likely secondary to chronic anemia.  2.  No significant disc bulging, central canal narrowing, or foraminal narrowing.  3.  No lumbar spine fracture or subluxation.    MRI brain 12/13/2018  1.  There is no hippocampal sclerosis.  2.  There is no evidence of cortical dysplasia or neuronal migrational abnormalities.  3.  A few nonspecific T2 hyperintensities in the supratentorial white matter likely representing nonspecific foci of gliosis, chronic ischemia and demyelination. This is unchanged since the previous MRI dated 2/23/2012.       Chest xray 06/07/2018: Minimal right-sided opacities as described above, suggesting pneumonia.               Results for orders placed during the hospital encounter of 07/19/17   MR-LUMBAR SPINE-W/O    Impression 1.  Diffuse decreased bone marrow signal intensity throughout the lumbar spine and sacrum, presumably secondary to chronic anemia.    2.  Otherwise unremarkable MRI scan of the lumbar spine without contrast.                                    DIAGNOSIS   Visit Diagnoses     ICD-10-CM   1. Peripheral polyneuropathy  G62.9   2. Avascular necrosis of bone of hip, left (HCC)  M87.052   3. Fibromyalgia  M79.7   4. Chronic bilateral low back pain without sciatica  M54.5    G89.29         ASSESSMENT and PLAN:     Debby Carrizales 40 y.o. Female returns for follow-up of her chronic pain related to lupus, AVN hips, low back and peripheral neuropathy    Debby was seen today for follow-up.    Diagnoses and all orders for this visit:    Peripheral polyneuropathy  -     pregabalin (LYRICA) 50 MG capsule; Take 1 capsule by mouth 2 times a day for 28 days.  -     Oxycodone HCl 20 MG Tab; Take 1 tablet by mouth 4 times a day as needed (pain) for up to 28 days.  -     Oxycodone HCl 20 MG Tab; Take 1 tablet by mouth 4 times a day as needed (pain) for up to 28 days.  -      pregabalin (LYRICA) 50 MG capsule; Take 1 capsule by mouth 2 times a day for 28 days.  -     Consent for Opiate Prescription  -     Controlled Substance Treatment Agreement    Avascular necrosis of bone of hip, left (HCC)  -     pregabalin (LYRICA) 50 MG capsule; Take 1 capsule by mouth 2 times a day for 28 days.  -     Oxycodone HCl 20 MG Tab; Take 1 tablet by mouth 4 times a day as needed (pain) for up to 28 days.  -     Oxycodone HCl 20 MG Tab; Take 1 tablet by mouth 4 times a day as needed (pain) for up to 28 days.  -     pregabalin (LYRICA) 50 MG capsule; Take 1 capsule by mouth 2 times a day for 28 days.  -     Consent for Opiate Prescription  -     Controlled Substance Treatment Agreement    Fibromyalgia  -     pregabalin (LYRICA) 50 MG capsule; Take 1 capsule by mouth 2 times a day for 28 days.  -     pregabalin (LYRICA) 50 MG capsule; Take 1 capsule by mouth 2 times a day for 28 days.    Chronic bilateral low back pain without sciatica  -     Oxycodone HCl 20 MG Tab; Take 1 tablet by mouth 4 times a day as needed (pain) for up to 28 days.  -     Oxycodone HCl 20 MG Tab; Take 1 tablet by mouth 4 times a day as needed (pain) for up to 28 days.  -     Consent for Opiate Prescription  -     Controlled Substance Treatment Agreement         1. Continue lyrica 50mg po bid.  2.  reviewed.  Most recent UDS reviewed.  Refill oxycodone #102 for the next 28 days.  Scripts given for next two months.  3. Discussed use of back  to assist in myofascial pain control.  If this does not improve, we can consider repeat trigger point injections.  4. Follow-up with PCP and specialists, she is anticipating going to peritoneal dialysis.    In prescribing controlled substances to this patient, I certify that I have obtained and reviewed the medical history of Debby Carrizales. I have also made a good ly effort to obtain applicable records from other providers who have treated the patient and records  "demonstrating the following: report of \"drug-seeking behavior,\" although I suspect that she has organic reasons for pain and will continue to monitor as appropriate.     I have conducted a physical exam and documented it. I have reviewed Ms. Carrizales’s prescription history as maintained by the [place] Prescription Monitoring Program.     I have assessed the patient’s risk for abuse, dependency, and addiction using the validated Opioid Risk Tool available at https://www.mdcalc.com/lfunnb-jqtz-wnst-ort-narcotic-abuse.     Given the above, I believe the benefits of controlled substance therapy outweigh the risks. The reasons for prescribing controlled substances include non-narcotic, oral analgesic alternatives have been inadequate for pain control and in my professional opinion, controlled substances are the only reasonable choice for this patient because she has limitations to medication choices due to being on dialysis.   Accordingly, I have discussed the risk and benefits, treatment plan, and alternative therapies with the patient.     Follow up: 8 weeks    Please note that this dictation was created using voice recognition software. I have made every reasonable attempt to correct obvious errors but there may be errors of grammar and content that I may have overlooked prior to finalization of this note.      Quinn Chandler MD  Interventional Spine and Sports Physiatry  Physical Medicine and Rehabilitation  [facility]  "

## 2021-07-26 RX ORDER — TIZANIDINE 4 MG/1
8 TABLET ORAL
Qty: 180 TABLET | Refills: 3 | Status: SHIPPED | OUTPATIENT
Start: 2021-07-26 | End: 2021-07-28

## 2021-07-28 RX ORDER — TIZANIDINE 4 MG/1
TABLET ORAL
Qty: 180 TABLET | Refills: 3 | Status: SHIPPED | OUTPATIENT
Start: 2021-07-28 | End: 2022-08-02

## 2021-07-28 RX ORDER — TRAZODONE HYDROCHLORIDE 50 MG/1
TABLET ORAL
Qty: 90 TABLET | Refills: 1 | Status: ON HOLD | OUTPATIENT
Start: 2021-07-28 | End: 2022-02-05

## 2021-08-20 ENCOUNTER — OFFICE VISIT (OUTPATIENT)
Dept: PHYSICAL MEDICINE AND REHAB | Facility: MEDICAL CENTER | Age: 41
End: 2021-08-20
Payer: MEDICARE

## 2021-08-20 VITALS
HEIGHT: 65 IN | TEMPERATURE: 97.6 F | BODY MASS INDEX: 29.86 KG/M2 | SYSTOLIC BLOOD PRESSURE: 138 MMHG | OXYGEN SATURATION: 94 % | HEART RATE: 97 BPM | WEIGHT: 179.23 LBS | DIASTOLIC BLOOD PRESSURE: 98 MMHG

## 2021-08-20 DIAGNOSIS — M79.10 MYALGIA: ICD-10-CM

## 2021-08-20 DIAGNOSIS — G89.29 CHRONIC BILATERAL LOW BACK PAIN WITHOUT SCIATICA: ICD-10-CM

## 2021-08-20 DIAGNOSIS — M79.7 FIBROMYALGIA: ICD-10-CM

## 2021-08-20 DIAGNOSIS — G62.9 PERIPHERAL POLYNEUROPATHY: ICD-10-CM

## 2021-08-20 DIAGNOSIS — M54.50 CHRONIC BILATERAL LOW BACK PAIN WITHOUT SCIATICA: ICD-10-CM

## 2021-08-20 DIAGNOSIS — M87.052 AVASCULAR NECROSIS OF BONE OF HIP, LEFT (HCC): ICD-10-CM

## 2021-08-20 DIAGNOSIS — M62.838 MUSCLE SPASM: ICD-10-CM

## 2021-08-20 PROCEDURE — 99214 OFFICE O/P EST MOD 30 MIN: CPT | Performed by: PHYSICAL MEDICINE & REHABILITATION

## 2021-08-20 RX ORDER — OXYCODONE HYDROCHLORIDE 20 MG/1
1 TABLET ORAL 4 TIMES DAILY PRN
Qty: 102 TABLET | Refills: 0 | Status: SHIPPED | OUTPATIENT
Start: 2021-08-20 | End: 2021-09-17

## 2021-08-20 RX ORDER — OXYCODONE HYDROCHLORIDE 20 MG/1
1 TABLET ORAL 4 TIMES DAILY PRN
Qty: 102 TABLET | Refills: 0 | Status: SHIPPED | OUTPATIENT
Start: 2021-09-21 | End: 2021-10-01 | Stop reason: SDUPTHER

## 2021-08-20 RX ORDER — PREGABALIN 50 MG/1
50 CAPSULE ORAL 2 TIMES DAILY
Qty: 56 CAPSULE | Refills: 0 | Status: SHIPPED | OUTPATIENT
Start: 2021-08-20 | End: 2021-09-17

## 2021-08-20 RX ORDER — PREGABALIN 50 MG/1
50 CAPSULE ORAL 2 TIMES DAILY
Qty: 56 CAPSULE | Refills: 0 | Status: SHIPPED | OUTPATIENT
Start: 2021-09-21 | End: 2021-10-01 | Stop reason: SDUPTHER

## 2021-08-20 ASSESSMENT — PATIENT HEALTH QUESTIONNAIRE - PHQ9: CLINICAL INTERPRETATION OF PHQ2 SCORE: 0

## 2021-08-20 ASSESSMENT — PAIN SCALES - GENERAL: PAINLEVEL: 6=MODERATE PAIN

## 2021-08-20 ASSESSMENT — FIBROSIS 4 INDEX: FIB4 SCORE: 1.06

## 2021-08-20 NOTE — PROGRESS NOTES
Follow up patient note  Pain Medicine, Interventional spine and sports physiatry, Physical medicine rehabilitation      Chief complaint:   Diffuse joint and body pain, low back, hips and legs    HISTORY      HPI  Patient identification/Interval history:   Debby Carrizales 40 y.o. female who has chronic pain related to rheumatologic disease, peripheral neuropathy and AVN hips, lupus.      She has been working on transitioning to peritoneal dialysis.  This has been getting going, still transitioning.  There are some adjustments to getting used to this.  Stretching is helpful in maintaining pain, but feels like she has been having difficulty with diffuse myofascial pain.    There are four fluid exchanges every day.  Current schedule is morning to mid-afternoon.      Pain, overall, is 7/10 on the NRS.    She sees Dr. Leonardo, her Neurologist, rescheduled 08/25/2021.    Bowel movements are regular, she takes stool softener.  This is really important now, she is staying regular and stool soft.  Lyrica 50mg po bid.  Taking percocet helps her to tolerate her dialysis.  Stable dose, she has not been able to reduce this yet.      ORT: 3  PHQ-9:0    Review of records:   Hospital discharge report noted from 08/12/2019-08/21/2019 admission.  She was admitted for a near syncopal episode.  Cardiac evaluation was suspicious for endocarditis and she was started on empiric IV abx.  GAYATHRI demonstrate no endocarditis, but did show that her venous catheter was pushing through the tricuspid valve.  Cultures were negative.  Dr. Jansen, vascular surgery, was consulted and they discussed follow-up plans for port management and this will be planned after discharge.    ROS   Unchanged except as in HPI     Red Flags :   Chills, Sweats:No fevers, chills and night sweats.  No fevers lately  Involuntary Weight Loss: Denies  Bowel/Bladder Incontinence: Denies  Saddle Anesthesia: Denies    PMHx:   Past Medical History:   Diagnosis Date   •  Anemia    • Arthritis     all joints,r/t lupus   • Avascular necrosis of bones of both hips ([facility]) 10/10/2016   • Blood clotting disorder ([facility])     in AV Graft   • Clostridium difficile colitis 5/3/2011   • Dialysis patient    • ESBL (extended spectrum beta-lactamase) producing bacteria infection 8/25/2014   • Fall    • Fibromyalgia    • High anion gap metabolic acidosis 4/2/2011   • Hypertension    • Lupus ([facility])    • Pneumonia    • Psychiatric disorder     anxiety, depression   • Pyelonephritis 11/21/2017   • Renal failure    • Sepsis due to pneumonia ([facility]) 6/7/2018   • Status epilepticus ([facility]) 12/13/2018    last seizure 2 mos ago 03/2021       PSHx:   Past Surgical History:   Procedure Laterality Date   • CATH PLACEMENT CAPD N/A 5/5/2021    Procedure: INSERTION, CATHETER, CAPD;  Surgeon: Víctor Berg M.D.;  Location: [facility];  Service: Vascular   • THROMBECTOMY Right 12/7/2020    Procedure: Right upper extremity arteriovenous graft thrombectomy;  Surgeon: Daniel Poole M.D.;  Location: [facility];  Service: Vascular   • CATH PLACEMENT Left 12/7/2020    Procedure: port-a-catheter removal;  Surgeon: Daniel Poole M.D.;  Location: [facility];  Service: Vascular   • GAYATHRI N/A 8/20/2019    Procedure: ECHOCARDIOGRAM, TRANSESOPHAGEAL;  Surgeon: Marta Elaine M.D.;  Location: [facility];  Service: Cardiac   • AV FISTULA REVISION Right 8/11/2018    Procedure: AV FISTULA REVISION- Graft and Thrombectomy;  Surgeon: Shabbir Ardon M.D.;  Location: [facility];  Service: General   • AV FISTULA THROMBOLYSIS Right 8/11/2018    Procedure: AV FISTULA THROMBOLYSIS;  Surgeon: Shabbir Ardon M.D.;  Location: [facility];  Service: General   • AV FISTULA CREATION Right 12/9/2017    Procedure: AV FISTULA CREATION-ARM FOR GRAFT;  Surgeon: Lesly Jansen M.D.;  Location: [facility];  Service: General   • THROMBECTOMY  12/9/2017     Procedure: THROMBECTOMY;  Surgeon: Lesly Jansen M.D.;  Location: [facility];  Service: General   • THROMBECTOMY Right 8/21/2016    Procedure: THROMBECTOMY - right AV fistula graft with grams;  Surgeon: Shabbir Ardon M.D.;  Location: [facility];  Service:    • ANGIOPLASTY BALLOON  8/21/2016    Procedure: ANGIOPLASTY BALLOON;  Surgeon: Shabbir Ardon M.D.;  Location: [facility];  Service:    • THROMBECTOMY Right 8/20/2016    Procedure: THROMBECTOMY AV GRAFT;  Surgeon: Shabbir Ardon M.D.;  Location: [facility];  Service:    • AV FISTULA CREATION Right 7/12/2016    Procedure: AV FISTULA CREATION WITH GRAFT BRACHIAL AXILLARY;  Surgeon: Shabbir Ardon M.D.;  Location: [facility];  Service:    • CLOSED REDUCTION Right 7/5/2016    Procedure: CLOSED REDUCTION- Hip ;  Surgeon: Michael Holman M.D.;  Location: [facility];  Service:    • HIP ARTHROPLASTY TOTAL Right 1/18/2016    Procedure: HIP ARTHROPLASTY TOTAL;  Surgeon: Michael Holman M.D.;  Location: [facility];  Service:    • AV FISTULA CREATION  2/3/2015    Performed by Shabbir Ardon M.D. at SURGERY [facility]   • AV FISTULA CREATION  11/14/2014    Performed by Shabbir Ardon M.D. at [facility]   • AV FISTULA CREATION  9/9/2014    Performed by Shabbir Ardon M.D. at SURGERY [facility]   • CATH PLACEMENT  9/9/2014    Performed by Shabbir Ardon M.D. at SURGERY [facility]   • OTHER  5/2011    tracheostomy   • GASTROSCOPY-ENDO  4/27/2011    Performed by PALMIRA DOAN at ENDOSCOPY [facility]   • COLONOSCOPY WITH BIOPSY  4/20/2011    Performed by ALCIRA CRUZ at ENDOSCOPY [facility]   • GASTROSCOPY-ENDO  4/18/2011    Performed by ALCIRA CRUZ at ENDOSCOPY [facility]   • GASTROSCOPY-ENDO  4/10/2011    Performed by SYED JURADO at ENDOSCOPY [facility]   • SCLEROTHERAPHY  4/10/2011     Performed by SYED JURADO at ENDOSCOPY [facility] ORS   • OTHER ABDOMINAL SURGERY      kidney biopsy   • TRACHEOSTOMY         Family history   Family History   Problem Relation Age of Onset   • Heart Disease Mother    • Cancer Father        Medications:   Outpatient Medications Marked as Taking for the 8/20/21 encounter (Office Visit) with Quinn Chanlder M.D.   Medication Sig Dispense Refill   • [START ON 9/21/2021] pregabalin (LYRICA) 50 MG capsule Take 1 Capsule by mouth 2 times a day for 28 days. 56 Capsule 0   • [START ON 9/21/2021] Oxycodone HCl 20 MG Tab Take 1 Tablet by mouth 4 times a day as needed (pain) for up to 28 days. 102 Tablet 0   • Oxycodone HCl 20 MG Tab Take 1 Tablet by mouth 4 times a day as needed (pain) for up to 28 days. 102 Tablet 0   • pregabalin (LYRICA) 50 MG capsule Take 1 Capsule by mouth 2 times a day for 28 days. 56 Capsule 0   • traZODone (DESYREL) 50 MG Tab TAKE 1 TABLET BY MOUTH EVERY NIGHT AT BEDTIME 90 tablet 1   • tizanidine (ZANAFLEX) 4 MG Tab TAKE 2 TABLETS BY MOUTH AT BEDTIME AS NEEDED FOR MUSCLE SPASMS 180 tablet 3   • amLODIPine (NORVASC) 10 MG Tab Take 1 tablet by mouth every day. 90 tablet 3   • lisinopril (PRINIVIL) 10 MG Tab Take 1 tablet by mouth every day. On day of dialysis, take this medication after dialysis. This medication is for high blood pressure 90 tablet 3   • Docusate Sodium (STOOL SOFTENER LAXATIVE PO) Take 1-2 Tablets by mouth 1 time a day as needed.     • calcium carbonate (TUMS) 500 MG Chew Tab Chew 1,000 mg 3 times a day. And after dialysis     • Cinacalcet HCl (SENSIPAR PO) Take 1 tablet by mouth 1 time a day as needed. During Dialysis      • promethazine (PHENERGAN) 25 MG Tab Take 1 Tab by mouth every 8 hours as needed for Nausea/Vomiting. 60 Tab 11   • Naloxone (NARCAN) 4 MG/0.1ML Liquid Administer 4 mg into affected nostril(S) one time as needed (for severe sleepiness or difficulty breathing due to opioid overdose) for up to 1 dose. 2 Each  0   • VELPHORO 500 MG Chew Tab TAKE 2 TABLETS BY MOUTH THREE TIMES DAILY WITH EACH MEALS (Patient taking differently: Chew 2 Tablets 3 times a day with meals.) 180 Tab 3   • fluticasone (FLONASE) 50 MCG/ACT nasal spray SHAKE LIQUID AND USE 1 SPRAY IN EACH NOSTRIL EVERY DAY (Patient taking differently: Administer 1 Spray into affected nostril(S) every day.) 48 g 3   • lacosamide (VIMPAT) 100 MG Tab tablet Take 100 mg by mouth 2 Times a Day.     • Cholecalciferol (VITAMIN D3) 18262 UNIT Cap Take 10,000 Units by mouth every day.     • omeprazole (PRILOSEC) 20 MG delayed-release capsule Take 1 Cap by mouth every day. 30 Cap 3   • ferrous sulfate 325 (65 FE) MG tablet Take 325 mg by mouth every day.     • hydroxychloroquine (PLAQUENIL) 200 MG Tab Take 200 mg by mouth every morning.         Allergies:   Allergies   Allergen Reactions   • Lorazepam [Ativan] Anxiety and Unspecified     Patient becomes severely paranoid and agitated, hallucinates   • Morphine Itching     Tolerates Dilaudid   • Seasonal Runny Nose and Itching     Hay fever, sabiha brush       Social Hx:   Social History     Socioeconomic History   • Marital status: Single     Spouse name: Not on file   • Number of children: Not on file   • Years of education: Not on file   • Highest education level: Not on file   Occupational History   • Not on file   Tobacco Use   • Smoking status: Current Every Day Smoker     Packs/day: 0.50     Years: 18.00     Pack years: 9.00     Start date: 1995     Last attempt to quit: 6/13/2011     Years since quitting: 10.1   • Smokeless tobacco: Never Used   • Tobacco comment: vaping now   Vaping Use   • Vaping Use: Some days   • Substances: Flavoring   • Devices: Disposable   Substance and Sexual Activity   • Alcohol use: No     Alcohol/week: 0.0 oz   • Drug use: No     Types: Marijuana   • Sexual activity: Not Currently     Partners: Male   Other Topics Concern   •  Service No   • Blood Transfusions Yes   • Caffeine Concern  "No   • Occupational Exposure No   • Hobby Hazards No   • Sleep Concern Yes   • Stress Concern Yes   • Weight Concern Yes   • Special Diet Yes   • Back Care No   • Exercise Yes   • Bike Helmet No   • Seat Belt Yes   • Self-Exams Yes   Social History Narrative   • Not on file     Social Determinants of Health     Financial Resource Strain: Medium Risk   • Difficulty of Paying Living Expenses: Somewhat hard   Food Insecurity: Food Insecurity Present   • Worried About Running Out of Food in the Last Year: Often true   • Ran Out of Food in the Last Year: Often true   Transportation Needs: Unmet Transportation Needs   • Lack of Transportation (Medical): Yes   • Lack of Transportation (Non-Medical): Yes   Physical Activity:    • Days of Exercise per Week:    • Minutes of Exercise per Session:    Stress:    • Feeling of Stress :    Social Connections:    • Frequency of Communication with Friends and Family:    • Frequency of Social Gatherings with Friends and Family:    • Attends Spiritism Services:    • Active Member of Clubs or Organizations:    • Attends Club or Organization Meetings:    • Marital Status:    Intimate Partner Violence:    • Fear of Current or Ex-Partner:    • Emotionally Abused:    • Physically Abused:    • Sexually Abused:        EXAMINATION     Physical Exam:   Vitals: /98 (BP Location: Right arm, Patient Position: Sitting, BP Cuff Size: Small adult)   Pulse 97   Temp 36.4 °C (97.6 °F) (Temporal)   Ht 1.651 m (5' 5\")   Wt 81.3 kg (179 lb 3.7 oz)   SpO2 94%     Constitutional:   Body Habitus: Body mass index is 29.83 kg/m².  Cooperation: Fully cooperates with exam  Appearance: Well-groomed no disheveled, in no acute distress, wearing a face mask   Respiratory-  breathing comfortable on room air, no wheezing.  Cardiovascular- no edema in the lower extremities  Psychiatric- alert and oriented ×3. Normal affect.   Spine:   No skin lesions, warmth or erythema over the injection signs. PD catheter " noted  Tenderness to palpation over the thoracic and lumbar paraspinals bilaterally, right greater than the left  Gait slow, steady without loss of balance.  No use of assist device.       MEDICAL DECISION MAKING    DATA    Labs:     UDS:  01/08/2021: positive for oxycodone and metabolites  03/12/2020 Positive for oxycodone and metabolites  12/19/2019 oxycodone and metabolites   08/22/2019 Oxycodone and metabolites (patient does not have a script for phenergan with codeine and this was negative) Therefore, consistent with medications  06/13/2019 Negative for oxycodone, positive for other oxycodone metabolites  04/16/2019 Positive for oxymorphone  01/17/2019 Oxycodone and metabolites as expected  10/02/2018 Oxycodone and metabolites, also fentanyl    01/09/2019: Hep B surface ag negative  01/08/2019: GFR 7; BUN 23 Cr 6.68, Plt 160    12/15/2018 CRP 3.03    GFR 12/06/2020 4  GFR 08/21/2019, 4  08/21/2019 WBC 4.6, Hb 10.1, Hct 31.6, Plt 156    BMP 12/16/2018  Na 136, Potassium 4.4, chloride 96, CO2 23 Glucose 83, BUN 55H, Cr 9.44H, Calcium 9.9, Anion gap 17.0H    CBC 3.3, Hb 9.6, Hct 31.3, Plt 115    Lab Results   Component Value Date/Time    HBA1C 4.9 06/07/2018 02:29 AM        Imaging:   I reviewed the following radiology reports reviewed  GAYATHRI 08/20/2019  Echocardiography Laboratory  CONCLUSIONS  LV EF    65%.  Structurally normal tricuspid valve.  Trace tricuspid regurgitation.  Port catheter noted in the SVC and right atrium.  Tip support appears bright, no obvious vegetation.  The tip of the port abuts the tricuspid valve.  Recommend the port be pulled back approximately 2 cm.  No evidence of endocarditis.    Xray hip with pelvis-left 07/28/2019  Left femoral head heterogeneous density is compatible with known diagnosis of avascular necrosis. There is no joint space narrowing or spurring    Right arthroplasty without evident complication.    Xray left foot 01/21/2019  Nondisplaced transverse fracture through the  base of the fifth metatarsal.    MRI lumbar spine 1/1/19  1.  Diffusely decreased signal again seen throughout the marrow space consistent with red marrow conversion, likely secondary to chronic anemia.  2.  No significant disc bulging, central canal narrowing, or foraminal narrowing.  3.  No lumbar spine fracture or subluxation.    MRI brain 12/13/2018  1.  There is no hippocampal sclerosis.  2.  There is no evidence of cortical dysplasia or neuronal migrational abnormalities.  3.  A few nonspecific T2 hyperintensities in the supratentorial white matter likely representing nonspecific foci of gliosis, chronic ischemia and demyelination. This is unchanged since the previous MRI dated 2/23/2012.       Chest xray 06/07/2018: Minimal right-sided opacities as described above, suggesting pneumonia.               Results for orders placed during the hospital encounter of 07/19/17   MR-LUMBAR SPINE-W/O    Impression 1.  Diffuse decreased bone marrow signal intensity throughout the lumbar spine and sacrum, presumably secondary to chronic anemia.    2.  Otherwise unremarkable MRI scan of the lumbar spine without contrast.                                    DIAGNOSIS   Visit Diagnoses     ICD-10-CM   1. Peripheral polyneuropathy  G62.9   2. Avascular necrosis of bone of hip, left (HCC)  M87.052   3. Fibromyalgia  M79.7   4. Myalgia  M79.10   5. Muscle spasm  M62.838   6. Chronic bilateral low back pain without sciatica  M54.5    G89.29         ASSESSMENT and PLAN:     Debby Carrizales 40 y.o. Female returns for follow-up of her chronic pain related to lupus, AVN hips, low back and peripheral neuropathy    Debby was seen today for follow-up.    Diagnoses and all orders for this visit:    Peripheral polyneuropathy  -     pregabalin (LYRICA) 50 MG capsule; Take 1 Capsule by mouth 2 times a day for 28 days.  -     Oxycodone HCl 20 MG Tab; Take 1 Tablet by mouth 4 times a day as needed (pain) for up to 28 days.  -      Oxycodone HCl 20 MG Tab; Take 1 Tablet by mouth 4 times a day as needed (pain) for up to 28 days.  -     pregabalin (LYRICA) 50 MG capsule; Take 1 Capsule by mouth 2 times a day for 28 days.  -     Pain Management Screen  -     Consent for Opiate Prescription  -     Controlled Substance Treatment Agreement    Avascular necrosis of bone of hip, left (HCC)  -     pregabalin (LYRICA) 50 MG capsule; Take 1 Capsule by mouth 2 times a day for 28 days.  -     Oxycodone HCl 20 MG Tab; Take 1 Tablet by mouth 4 times a day as needed (pain) for up to 28 days.  -     Oxycodone HCl 20 MG Tab; Take 1 Tablet by mouth 4 times a day as needed (pain) for up to 28 days.  -     pregabalin (LYRICA) 50 MG capsule; Take 1 Capsule by mouth 2 times a day for 28 days.  -     Pain Management Screen  -     Consent for Opiate Prescription  -     Controlled Substance Treatment Agreement    Fibromyalgia  -     pregabalin (LYRICA) 50 MG capsule; Take 1 Capsule by mouth 2 times a day for 28 days.  -     pregabalin (LYRICA) 50 MG capsule; Take 1 Capsule by mouth 2 times a day for 28 days.    Myalgia    Muscle spasm    Chronic bilateral low back pain without sciatica  -     Oxycodone HCl 20 MG Tab; Take 1 Tablet by mouth 4 times a day as needed (pain) for up to 28 days.  -     Oxycodone HCl 20 MG Tab; Take 1 Tablet by mouth 4 times a day as needed (pain) for up to 28 days.  -     Pain Management Screen  -     Consent for Opiate Prescription  -     Controlled Substance Treatment Agreement       1. Discussed that she is starting transition to full-time, independent PD.  She is still adjusting.  We discussed continuing her current oxycodone dose with goal of reducing this.  2. Continue lyrica 50mg po bid with refill.  3.  reviewed. Most recent UDS reviewed from 01/2021.  Plan to repeat UDS today.  Change to saliva, if unable to void. Refill oxycodone #102 for the next 28 days.  Scripts given for next two months.  4. Discussed possible repeat trigger  "point injections, encouraged her to continue stretching and discussed that she might get a back .  Unable to afford at this time.  5. Follow-up with PCP and specialists, she is anticipating going to peritoneal dialysis.    In prescribing controlled substances to this patient, I certify that I have obtained and reviewed the medical history of Debby Carrizales. I have also made a good ly effort to obtain applicable records from other providers who have treated the patient and records demonstrating the following: report of \"drug-seeking behavior,\" although I suspect that she has organic reasons for pain and will continue to monitor as appropriate.     I have conducted a physical exam and documented it. I have reviewed Ms. Carrizales’s prescription history as maintained by the [place] Prescription Monitoring Program.     I have assessed the patient’s risk for abuse, dependency, and addiction using the validated Opioid Risk Tool available at https://www.mdcalc.com/yyvtbd-ucqu-pprf-ort-narcotic-abuse.     Given the above, I believe the benefits of controlled substance therapy outweigh the risks. The reasons for prescribing controlled substances include non-narcotic, oral analgesic alternatives have been inadequate for pain control and in my professional opinion, controlled substances are the only reasonable choice for this patient because she has limitations to medication choices due to being on dialysis.   Accordingly, I have discussed the risk and benefits, treatment plan, and alternative therapies with the patient.     Follow up: 8 weeks    Please note that this dictation was created using voice recognition software. I have made every reasonable attempt to correct obvious errors but there may be errors of grammar and content that I may have overlooked prior to finalization of this note.      Quinn Chandler MD  Interventional Spine and Sports Physiatry  Physical Medicine and Rehabilitation  [facility]

## 2021-09-09 NOTE — PROGRESS NOTES
"Follow up patient note  Pain Medicine, Interventional spine and sports physiatry, Physical medicine rehabilitation      Chief complaint:   Cervicalgia, hips and knees, bilateral leg pain      HISTORY      HPI  Patient identification/Interval histotry: Debby Carrizales 38 y.o. female who has chronic pain related to rheumatologic disease, peripheral neuropathy and AVN hips, lupus.       Since the last visit, she was in the hospital again from 01/01/2019 to 01/09/2019.   Her pain is much worse now as she is having more sensitivity and burning on the soles of her feet and hands.  She was taken off of her lyrica, she does not know why.  She has not missed dialysis since discharge.    Otherwise, her pain is in the same locations as before.  She notes that she was changed from keppra to vimpat during the last hospital stay.    Otherwise,  pains in the aching pain in her hips, numbness and tingling in the legs and hands with some burning pain.  Her nerve pain has \"tripled\" since being off lyrica.  Walking is painful.  She feels a little stronger than when I saw her last.  Home health has not been started yet, but she plans to follow-up with them.    She was taking lyrica 100mg po tid without side effects, previously.    No side effects from oxycodone, mild constipation is managed with colace and a miralax equivalent.  She is having regular bowel movements on a daily basis.    Review of hospital records from 01/01/19-01/09/2019  In reviewing these records, Neurology note indicated that lyrica had been held for concern of sedation.     ROS   Admitted to the hospital 12/4-12/17 for uremia, lower abdominal pain, ESRD, status epilepticus; hospital stay 01/01/19-01/09/2019    Red Flags :   Chills, Sweats:No fevers, chills and night sweats.  Involuntary Weight Loss: Denies  Bowel/Bladder Incontinence: Denies  Saddle Anesthesia: Denies    PMHx:   Past Medical History:   Diagnosis Date   • Arthritis     all joints,r/t lupus " Patient's chart reviewed, please contact to schedule OA colonoscopy with .Patient Preference    Screening Criteria  Age: 50 year old Patient meets age criteria.  PCP:  Clemencia Ruiz MD  Personal H/O Colon CA or Polyps: Patient does NOT have a personal history of colon polyps or colon cancer  Family H/O Colon CA:  family history of colon cancer.  GI Symptoms: history of anemia  Most recent colon procedure No prior Flexi or Colonoscopy  Concerns for taking prep at home(mobility, etc): No    ALLERGIES:   Allergen Reactions   • Erythromycin VOMITING       Medications:      Summary of your Discharge Medications          Accurate as of September 9, 2021 11:51 AM. Always use your most recent med list.            Take these Medications      Details   albuterol 108 (90 Base) MCG/ACT inhaler  Commonly known as: Ventolin HFA   Inhale 2 puffs into the lungs every 4 hours as needed for Shortness of Breath or Wheezing.     diclofenac 50 MG tablet  Commonly known as: CATAFLAM   Take 1 tablet by mouth 3 times daily. NOT to take with other NSAIDS. Take with food     HYDROcodone-acetaminophen 5-325 MG per tablet  Commonly known as: NORCO   Take 1 tablet by mouth every 6 hours as needed for Pain.     ibuprofen 200 MG tablet  Commonly known as: MOTRIN   Take 200 mg by mouth every 6 hours as needed for Pain (twice per week on average).     mometasone 50 MCG/ACT nasal spray  Commonly known as: NASONEX   Spray 2 sprays in each nostril daily.     naproxen 500 MG tablet  Commonly known as: NAPROSYN   Take 1 tablet by mouth 2 times daily as needed for Pain (or cramping).     ondansetron 4 MG disintegrating tablet  Commonly known as: Zofran ODT   Take 1-2 tablets every 8 hours for nausea     OSTEO BI-FLEX TRIPLE STRENGTH PO   Take 1 tablet by mouth.     tamsulosin 0.4 MG Cap  Commonly known as: FLOMAX   Take one tablet daily until stone passes            Anticoagulation (examples - coumadin, heparin, lovenox, xarelto, pradaxa):    • Avascular necrosis of bones of both hips (HCC) 10/10/2016   • Clostridium difficile colitis 5/3/2011   • Dialysis patient    • ESBL (extended spectrum beta-lactamase) producing bacteria infection 8/25/2014   • Fibromyalgia    • Hypertension    • Lupus    • Pneumonia    • Psychiatric disorder     anxiety, depression   • Pyelonephritis 11/21/2017   • Renal failure        PSHx:   Past Surgical History:   Procedure Laterality Date   • AV FISTULA REVISION Right 8/11/2018    Procedure: AV FISTULA REVISION- Graft and Thrombectomy;  Surgeon: Shabbir Ardon M.D.;  Location: SURGERY [facility];  Service: General   • AV FISTULA THROMBOLYSIS Right 8/11/2018    Procedure: AV FISTULA THROMBOLYSIS;  Surgeon: Shabbir Ardon M.D.;  Location: SURGERY [facility];  Service: General   • AV FISTULA CREATION Right 12/9/2017    Procedure: AV FISTULA CREATION-ARM FOR GRAFT;  Surgeon: Lesly Jansen M.D.;  Location: [facility];  Service: General   • THROMBECTOMY  12/9/2017    Procedure: THROMBECTOMY;  Surgeon: Lesly Jansen M.D.;  Location: [facility];  Service: General   • THROMBECTOMY Right 8/21/2016    Procedure: THROMBECTOMY - right AV fistula graft with grams;  Surgeon: Shabbir Ardon M.D.;  Location: [facility];  Service:    • ANGIOPLASTY BALLOON  8/21/2016    Procedure: ANGIOPLASTY BALLOON;  Surgeon: Shabbir Ardon M.D.;  Location: [facility];  Service:    • THROMBECTOMY Right 8/20/2016    Procedure: THROMBECTOMY AV GRAFT;  Surgeon: Shabbir Ardon M.D.;  Location: [facility];  Service:    • AV FISTULA CREATION Right 7/12/2016    Procedure: AV FISTULA CREATION WITH GRAFT BRACHIAL AXILLARY;  Surgeon: Shabbir Ardon M.D.;  Location: SURGERY [facility];  Service:    • CLOSED REDUCTION Right 7/5/2016    Procedure: CLOSED REDUCTION- Hip ;  Surgeon: Michael Holman M.D.;  Location: [facility];  Service:    • HIP  No  Platelet Modifying (examples - Plavix, aspirin, nsaids, Aggrenox) : Yes  Concerns for sedation. On Chronic long acting narcotics, Methadone, Suboxone, antianxiety like xanax, klonazepam: Yes  Review of other medications indicate - chronic long-term narcotic use and inhaler  BMI: Estimated body mass index is 23.81 kg/m² as calculated from the following:    Height as of 8/6/21: 5' 7\" (1.702 m).    Weight as of 8/10/21: 68.9 kg.:more than 45 No  Is the patient over 300 lbs Weight:   Wt Readings from Last 1 Encounters:   08/10/21 68.9 kg   :  No  On Oxygen:  No    Past Medical History:  Past Medical History:   Diagnosis Date   • Anemia    • History of anemia years ago-due to menometrorrhagia   • History of kidney stones 1991 & 2001    Passed   • Renal calculus, left 11/5/2014    Stone(s) asymptomatic up to 7 mm   • Right distal ureteral calculus 11/5/2014    2 mm @ [facility], 60% Ca Phos & 30% Ca Oxal       Past Surgical History:  Past Surgical History:   Procedure Laterality Date   • Fragmenting of kidney stone  11/17/2014    Left   • [place] tooth extraction          Other Concerns: SEE ABOVE MEDICAL AND SURGICAL HISTORY  History of kidney stones    Prior Labs: If abnormal creatinine/electrolytes, then will need Nulytely prep  Creatinine (mg/dL)   Date Value   08/10/2021 0.60     BUN (mg/dL)   Date Value   08/10/2021 13     Sodium (mmol/L)   Date Value   08/10/2021 138     Potassium (mmol/L)   Date Value   08/10/2021 4.1     Chloride (mmol/L)   Date Value   08/10/2021 111 (H)     Carbon Dioxide (mmol/L)   Date Value   08/10/2021 28     Glucose (mg/dL)   Date Value   08/10/2021 121 (H)     Anion Gap (mmol/L)   Date Value   08/10/2021 3 (L)     Calcium (mg/dL)   Date Value   08/10/2021 8.7         SCHEDULING:  OK to schedule open access colonoscopy, if no then will need office visit: Yes  Physician Scheduled with: Patient Preference  Diagnosis code from O/A order:  Family history of colon cancer [Z80.0]  Location: Patient  ARTHROPLASTY TOTAL Right 1/18/2016    Procedure: HIP ARTHROPLASTY TOTAL;  Surgeon: Michael Holman M.D.;  Location: SURGERY [facility];  Service:    • AV FISTULA CREATION  2/3/2015    Performed by Shabbir Ardon M.D. at SURGERY [facility]   • AV FISTULA CREATION  11/14/2014    Performed by Shabbir Ardon M.D. at SURGERY [facility]   • AV FISTULA CREATION  9/9/2014    Performed by Shabbir Ardon M.D. at SURGERY [facility]   • CATH PLACEMENT  9/9/2014    Performed by Shabbir Ardon M.D. at SURGERY [facility]   • OTHER  5/2011    tracheostomy   • GASTROSCOPY-ENDO  4/27/2011    Performed by PALMIRA DOAN at ENDOSCOPY [facility]   • COLONOSCOPY WITH BIOPSY  4/20/2011    Performed by ALCIRA CRUZ at [facility]   • GASTROSCOPY-ENDO  4/18/2011    Performed by ALCIRA CRUZ at ENDOSCOPY [facility]   • GASTROSCOPY-ENDO  4/10/2011    Performed by SYED JURADO at ENDOSCOPY [facility]   • SCLEROTHERAPHY  4/10/2011    Performed by SYED JURADO at ENDOSCOPY [facility]   • OTHER ABDOMINAL SURGERY      kidney biopsy   • TRACHEOSTOMY         Family history   Denies neuromuscular disease  Family History   Problem Relation Age of Onset   • Heart Disease Mother    • Cancer Father        Medications:   Outpatient Prescriptions Marked as Taking for the 1/17/19 encounter (Office Visit) with Quinn Chandler M.D.   Medication Sig Dispense Refill   • Oxycodone HCl 20 MG Tab Take 1 Tab by mouth every 6 hours as needed (Take up to three a day as needed and one additional pill after dialysis (3 times a week)) for up to 28 days. 96 Tab 0   • pregabalin (LYRICA) 75 MG Cap Take 1 Cap by mouth 3 times a day for 28 days. 84 Cap 0   • ARIPiprazole (ABILIFY) 5 MG tablet Take 1 Tab by mouth every day. 30 Tab 0   • amLODIPine (NORVASC) 10 MG Tab Take 1 Tab by mouth every day. 30 Tab 0   • promethazine (PHENERGAN) 25 MG Tab Take 25 mg by mouth every 8  Preference  Sedation: MAC  Prep: Nulytely  Covid: Fully Vaccinated  No  Immunocompromised:  No       hours as needed for Nausea/Vomiting.     • lidocaine (LIDODERM) 5 % Patch Apply 1 Patch to skin as directed every 24 hours. 10 Patch 1   • omeprazole (PRILOSEC) 20 MG delayed-release capsule Take 1 Cap by mouth every day. 30 Cap 3   • sevelamer carbonate (RENVELA) 800 MG Tab tablet Take 2,400 mg by mouth 3 times a day, with meals.     • ferrous sulfate 325 (65 FE) MG tablet Take 325 mg by mouth every day.     • ascorbic acid (ASCORBIC ACID) 500 MG Tab Take 500 mg by mouth every day.     • hydroxychloroquine (PLAQUENIL) 200 MG Tab Take 200 mg by mouth every bedtime.     • cholecalciferol (VITAMIN D3) 5000 UNIT Cap Take 10,000 Units by mouth every day.     • trazodone (DESYREL) 50 MG Tab Take 1 Tab by mouth every bedtime. 30 Tab 0   • tizanidine (ZANAFLEX) 4 MG Tab Take 2 Tabs by mouth every bedtime. 60 Tab 0       Allergies:   Allergies   Allergen Reactions   • Lorazepam [Ativan] Anxiety     Patient becomes severely paranoid and agitated   • Morphine Itching     Tolerates Dilaudid   • Seasonal Runny Nose and Itching     Hay fever, sabiha brush       Social Hx:   Social History     Social History   • Marital status: Single     Spouse name: N/A   • Number of children: N/A   • Years of education: N/A     Occupational History   • Not on file.     Social History Main Topics   • Smoking status: Former Smoker     Packs/day: 0.50     Years: 18.00     Types: Cigarettes     Quit date: 6/13/2011   • Smokeless tobacco: Never Used      Comment: 1/2 ppd   • Alcohol use No   • Drug use: No   • Sexual activity: Not on file     Other Topics Concern   •  Service No   • Blood Transfusions Yes   • Caffeine Concern No   • Occupational Exposure No   • Hobby Hazards No   • Sleep Concern Yes   • Stress Concern Yes   • Weight Concern Yes   • Special Diet Yes   • Back Care No   • Exercise Yes   • Bike Helmet No   • Seat Belt Yes   • Self-Exams Yes     Social History Narrative   • No narrative on file       EXAMINATION     Physical Exam:  "  Vitals: Blood pressure 100/60, pulse 100, temperature 36.9 °C (98.4 °F), temperature source Temporal, height 1.651 m (5' 5\"), weight 74.9 kg (165 lb 2 oz), SpO2 95 %, not currently breastfeeding.    Constitutional:   Body Habitus: Body mass index is 27.48 kg/m².  Cooperation: Fully cooperates with exam  Appearance: Well-groomed no disheveled, in no acute distress, Appears pale, thin  Respiratory-  breathing comfortable on room air, no wheezing.  Cardiovascular- No pitting edema noted in the lower extremities bilaterally  Psychiatric- alert and oriented ×3. Normal affect.   Spine:   Gait slow, steady without loss of balance.  No use of assist device      MEDICAL DECISION MAKING    DATA    Labs:     01/09/2019: Hep B surface ag negative  01/08/2019: GFR 7; BUN 23 Cr 6.68, Plt 160    12/15/2018 CRP 3.03    BMP 12/16/2018  Na 136, Potassium 4.4, chloride 96, CO2 23 Glucose 83, BUN 55H, Cr 9.44H, Calcium 9.9, Anion gap 17.0H    CBC 3.3, Hb 9.6, Hct 31.3, Plt 115    Lab Results   Component Value Date/Time    HBA1C 4.9 06/07/2018 02:29 AM        Imaging:   I reviewed the following radiology reports reviewed  MRI lumbar spine 1/1/19  1.  Diffusely decreased signal again seen throughout the marrow space consistent with red marrow conversion, likely secondary to chronic anemia.  2.  No significant disc bulging, central canal narrowing, or foraminal narrowing.  3.  No lumbar spine fracture or subluxation.    MRI brain 12/13/2018  1.  There is no hippocampal sclerosis.  2.  There is no evidence of cortical dysplasia or neuronal migrational abnormalities.  3.  A few nonspecific T2 hyperintensities in the supratentorial white matter likely representing nonspecific foci of gliosis, chronic ischemia and demyelination. This is unchanged since the previous MRI dated 2/23/2012.       Chest xray 06/07/2018: Minimal right-sided opacities as described above, suggesting pneumonia.               Results for orders placed during the " hospital encounter of 07/19/17   MR-LUMBAR SPINE-W/O    Impression 1.  Diffuse decreased bone marrow signal intensity throughout the lumbar spine and sacrum, presumably secondary to chronic anemia.    2.  Otherwise unremarkable MRI scan of the lumbar spine without contrast.                                    DIAGNOSIS   Visit Diagnoses     ICD-10-CM   1. Chronic, continuous use of opioids F11.90   2. Peripheral polyneuropathy (HCC) G62.9   3. Avascular necrosis of bones of both hips (HCC) M87.051    M87.052   4. Therapeutic opioid induced constipation K59.03    T40.2X5A   5. Fibromyalgia M79.7   6. Chronic bilateral low back pain without sciatica M54.5    G89.29         ASSESSMENT and PLAN:     Debby Carrizales 37 y.o. Female returns for follow-up of her chronic pain related to lupus, AVN hips, low back and peripheral neuropathy    Debby was seen today for follow-up.    Diagnoses and all orders for this visit:    Chronic, continuous use of opioids  -     PAIN MANAGEMENT SCRN, UR; Future    Peripheral polyneuropathy (HCC)  -     Oxycodone HCl 20 MG Tab; Take 1 Tab by mouth every 6 hours as needed (Take up to three a day as needed and one additional pill after dialysis (3 times a week)) for up to 28 days.  -     pregabalin (LYRICA) 75 MG Cap; Take 1 Cap by mouth 3 times a day for 28 days.  -     Consent for Opiate Prescription    Avascular necrosis of bones of both hips (HCC)  -     Oxycodone HCl 20 MG Tab; Take 1 Tab by mouth every 6 hours as needed (Take up to three a day as needed and one additional pill after dialysis (3 times a week)) for up to 28 days.  -     Consent for Opiate Prescription  -     PAIN MANAGEMENT SCRN, UR; Future    Therapeutic opioid induced constipation    Fibromyalgia  -     Oxycodone HCl 20 MG Tab; Take 1 Tab by mouth every 6 hours as needed (Take up to three a day as needed and one additional pill after dialysis (3 times a week)) for up to 28 days.  -     Consent for Opiate  "Prescription    Chronic bilateral low back pain without sciatica  -     Oxycodone HCl 20 MG Tab; Take 1 Tab by mouth every 6 hours as needed (Take up to three a day as needed and one additional pill after dialysis (3 times a week)) for up to 28 days.  -     Consent for Opiate Prescription       Plan to delay previously planned trigger point injections.  Start home health therapies.  She reports that this will start tomorrow.    Continue with script to every 28 days.  She has difficulty with arranging transportation and it will be helpful to have it coincide with her appointments.    Discussed continuing oxycodone as previously written.    Plan to rechallenge with lyrica.  Start with titrating with 75mg, up to TID.    Repeat UDS.  Converted to sputum.    In prescribing controlled substances to this patient, I certify that I have obtained and reviewed the medical history of Debby Carrizales. I have also made a good ly effort to obtain applicable records from other providers who have treated the patient and records demonstrating the following: report of \"drug-seeking behavior,\" although I suspect that she has organic reasons for pain and will continue to monitor as appropriate.     I have conducted a physical exam and documented it. I have reviewed Ms. Carrizales’s prescription history as maintained by the [place] Prescription Monitoring Program.     I have assessed the patient’s risk for abuse, dependency, and addiction using the validated Opioid Risk Tool available at https://www.mdcalc.com/lhxemw-jvqa-edcj-ort-narcotic-abuse.     Given the above, I believe the benefits of controlled substance therapy outweigh the risks. The reasons for prescribing controlled substances include non-narcotic, oral analgesic alternatives have been inadequate for pain control and in my professional opinion, controlled substances are the only reasonable choice for this patient because she has limitations to medication choices due " to being on dialysis.   Accordingly, I have discussed the risk and benefits, treatment plan, and alternative therapies with the patient.       Follow up: 4 weeks     Thank you for allowing me to participate in the care of this patient. If you have any questions please not hesitate to contact me.      Please note that this dictation was created using voice recognition software. I have made every reasonable attempt to correct obvious errors but there may be errors of grammar and content that I may have overlooked prior to finalization of this note.      Quinn Chandler MD  Interventional Spine and Sports Physiatry  Physical Medicine and Rehabilitation  [facility]

## 2021-09-21 NOTE — CONSULTS
DATE OF SERVICE:  08/11/2018    REQUESTING PHYSICIAN:  Marga Rothman MD    REASON FOR CONSULTATION:  Management of chronic kidney disease, and assessing   the need for urgent dialysis.    The patient seen and examined, medical records were reviewed.    HISTORY OF PRESENT ILLNESS:  The patient is a very pleasant, but unfortunate   37-year-old young lady who is very well known to our service.  She has a   history of lupus, lupus nephritis, end-stage renal disease, undergoes   hemodialysis on Monday, Wednesday, and Friday at [facility], patient   presented to the hospital yesterday with AV fistula thrombosis.  Patient has   been admitted.  She underwent right arm AV graft thrombectomy earlier today.    We were called to manage her kidney disease, assessing the need for dialysis.    Patient missed dialysis yesterday.  No fever, no chills.  She feels tired.    She has some achiness around the surgery site.  No hematuria, no dysuria.    PAST MEDICAL HISTORY:  Significant for:  1.  End-stage renal disease.  2.  Lupus.   3.  Lupus nephritis.  4.  Chronic pain.  5.  Fibromyalgia.    ALLERGIES:  The list was reviewed.    SOCIAL HISTORY:  Patient lives with her mother.  She is a former smoker.      FAMILY HISTORY:  No known renal disease.    REVIEW OF SYSTEMS:  All systems reviewed and were negative except as outlined   in history of present illness.    MEDICATIONS:  Reviewed.    PHYSICAL EXAMINATION:  GENERAL:  Patient is in no apparent distress.  VITAL SIGNS:  Showed blood pressure of 180/100, heart rate was 73, RR 17.  HEAD:  Normocephalic, atraumatic.  Sclerae is anicteric.  Extraocular   movements were intact.  Nose is normal.  Mucous membranes are moist.  NECK:  No JVD and no lymphadenopathy, no masses.  CHEST:  Normal.  LUNGS:  Clear to auscultation,no rales  HEART:  S1, S2, no TANG.  ABDOMEN:  Soft, nontender, no HSM.  EXTREMITIES:  There is +1 edema.  Patient has no inguinal lymphadenopathy, good distal  pulses.  SKIN:  There is ecchymosis around right upper arm AV graft.  NEUROLOGIC:  Patient is alert and oriented x3, no cranial nerve deficit.  PSYCHIATRIC:  Mood is normal.    LABORATORY DATA:  Recent labs from today were reviewed.  Patient also had   chest x-ray done yesterday.  I reviewed the image myself, showed no pulmonary   edema.    ASSESSMENT AND PLAN:  1.  End-stage renal disease.  2.  Uncontrolled hypertension.  3.  Anemia.  4.  Hemodialysis access dysfunction.    PLAN:  1.  We will plan on dialysis today to manage her uremia as the patient has   missed dialysis yesterday, also to control her blood pressure.  2.  Low sodium diet.  3.  I appreciate Dr. Ardon's help.  4.  Renal dose all medications.  5.  Avoid nephrotoxins.  6.  Erythropoietin for anemia.       ____________________________________     FADI NAJJAR, MD FN / ISSAC    DD:  08/11/2018 14:12:05  DT:  08/11/2018 14:50:20    D#:  [id number]  Job#:  [id number]     Self

## 2021-10-01 ENCOUNTER — OFFICE VISIT (OUTPATIENT)
Dept: PHYSICAL MEDICINE AND REHAB | Facility: MEDICAL CENTER | Age: 41
End: 2021-10-01
Payer: MEDICARE

## 2021-10-01 VITALS
HEART RATE: 95 BPM | OXYGEN SATURATION: 93 % | HEIGHT: 65 IN | WEIGHT: 194.89 LBS | TEMPERATURE: 97.8 F | BODY MASS INDEX: 32.47 KG/M2 | SYSTOLIC BLOOD PRESSURE: 130 MMHG | DIASTOLIC BLOOD PRESSURE: 80 MMHG

## 2021-10-01 DIAGNOSIS — M79.7 FIBROMYALGIA: ICD-10-CM

## 2021-10-01 DIAGNOSIS — M87.052 AVASCULAR NECROSIS OF BONE OF HIP, LEFT (HCC): ICD-10-CM

## 2021-10-01 DIAGNOSIS — G62.9 PERIPHERAL POLYNEUROPATHY: ICD-10-CM

## 2021-10-01 DIAGNOSIS — G89.29 CHRONIC BILATERAL LOW BACK PAIN WITHOUT SCIATICA: ICD-10-CM

## 2021-10-01 DIAGNOSIS — M54.50 CHRONIC BILATERAL LOW BACK PAIN WITHOUT SCIATICA: ICD-10-CM

## 2021-10-01 PROCEDURE — 99214 OFFICE O/P EST MOD 30 MIN: CPT | Performed by: PHYSICAL MEDICINE & REHABILITATION

## 2021-10-01 RX ORDER — OXYCODONE HYDROCHLORIDE 20 MG/1
1 TABLET ORAL 4 TIMES DAILY PRN
Qty: 100 TABLET | Refills: 0 | Status: ON HOLD | OUTPATIENT
Start: 2021-11-16 | End: 2021-11-05

## 2021-10-01 RX ORDER — PREGABALIN 50 MG/1
50 CAPSULE ORAL 2 TIMES DAILY
Qty: 56 CAPSULE | Refills: 1 | Status: SHIPPED | OUTPATIENT
Start: 2021-10-19 | End: 2021-11-10 | Stop reason: SDUPTHER

## 2021-10-01 RX ORDER — LACTULOSE 10 G/15ML
20 SOLUTION ORAL
COMMUNITY
Start: 2021-09-30

## 2021-10-01 RX ORDER — SEVELAMER CARBONATE 800 MG/1
TABLET, FILM COATED ORAL SEE ADMIN INSTRUCTIONS
COMMUNITY
Start: 2021-09-15 | End: 2021-11-17

## 2021-10-01 RX ORDER — CEPHALEXIN 500 MG/1
CAPSULE ORAL
COMMUNITY
Start: 2021-08-22 | End: 2021-11-01

## 2021-10-01 RX ORDER — OXYCODONE HYDROCHLORIDE 20 MG/1
1 TABLET ORAL 4 TIMES DAILY PRN
Qty: 100 TABLET | Refills: 0 | Status: SHIPPED | OUTPATIENT
Start: 2021-10-19 | End: 2021-11-10 | Stop reason: SDUPTHER

## 2021-10-01 ASSESSMENT — PAIN SCALES - GENERAL: PAINLEVEL: 7=MODERATE-SEVERE PAIN

## 2021-10-01 ASSESSMENT — FIBROSIS 4 INDEX: FIB4 SCORE: 1.090909090909090909

## 2021-10-01 ASSESSMENT — PATIENT HEALTH QUESTIONNAIRE - PHQ9: CLINICAL INTERPRETATION OF PHQ2 SCORE: 0

## 2021-10-01 NOTE — PROGRESS NOTES
Follow up patient note  Pain Medicine, Interventional spine and sports physiatry, Physical medicine rehabilitation      Chief complaint:   Diffuse joint and body pain, low back, hips and legs    HISTORY      HPI  Patient identification/Interval history:   Debby Carrizales 41 y.o. female who has chronic pain related to rheumatologic disease, peripheral neuropathy and AVN hips, lupus.      Debby has been having better sleep now with PD, not waking up with back pain as much.  Adjusting to change in dialysis.  She was successful in her recent family travel.    Pain is a 6-7/10 on the NRS.  Oxycodone helps her to maintain her daily activities.  She has been changing her use of oxycodone to every 4-6h and is open to reduce pain medication.  Bowel movements are regular.  She is using lactulose and stool softeners.  Lyrica 50mg po bid.     She has been having some right hip pain.  She is going to start a yoga class and looks forward to this.    There are four fluid exchanges every day.  No less than 2 hours and no more than 12 hours.      She sees Dr. Leonardo, her Neurologist.        ORT: 3  PHQ-9:0    Review of records:   Hospital discharge report noted from 08/12/2019-08/21/2019 admission.  She was admitted for a near syncopal episode.  Cardiac evaluation was suspicious for endocarditis and she was started on empiric IV abx.  GAYATHRI demonstrate no endocarditis, but did show that her venous catheter was pushing through the tricuspid valve.  Cultures were negative.  Dr. Jansen, vascular surgery, was consulted and they discussed follow-up plans for port management and this will be planned after discharge.    ROS   Unchanged except as in HPI     Red Flags :   Chills, Sweats:No fevers, chills and night sweats.  No fevers lately  Involuntary Weight Loss: Denies  Bowel/Bladder Incontinence: Denies  Saddle Anesthesia: Denies    PMHx:   Past Medical History:   Diagnosis Date   • Anemia    • Arthritis     all joints,r/t lupus    • Avascular necrosis of bones of both hips ([facility]) 10/10/2016   • Blood clotting disorder ([facility])     in AV Graft   • Clostridium difficile colitis 5/3/2011   • Dialysis patient    • ESBL (extended spectrum beta-lactamase) producing bacteria infection 8/25/2014   • Fall    • Fibromyalgia    • High anion gap metabolic acidosis 4/2/2011   • Hypertension    • Lupus ([facility])    • Pneumonia    • Psychiatric disorder     anxiety, depression   • Pyelonephritis 11/21/2017   • Renal failure    • Sepsis due to pneumonia ([facility]) 6/7/2018   • Status epilepticus ([facility]) 12/13/2018    last seizure 2 mos ago 03/2021       PSHx:   Past Surgical History:   Procedure Laterality Date   • CATH PLACEMENT CAPD N/A 5/5/2021    Procedure: INSERTION, CATHETER, CAPD;  Surgeon: Víctor Berg M.D.;  Location: [facility];  Service: Vascular   • THROMBECTOMY Right 12/7/2020    Procedure: Right upper extremity arteriovenous graft thrombectomy;  Surgeon: Daniel Poole M.D.;  Location: [facility];  Service: Vascular   • CATH PLACEMENT Left 12/7/2020    Procedure: port-a-catheter removal;  Surgeon: Daniel Poole M.D.;  Location: [facility];  Service: Vascular   • GAYATHRI N/A 8/20/2019    Procedure: ECHOCARDIOGRAM, TRANSESOPHAGEAL;  Surgeon: Marta Elaine M.D.;  Location: [facility];  Service: Cardiac   • AV FISTULA REVISION Right 8/11/2018    Procedure: AV FISTULA REVISION- Graft and Thrombectomy;  Surgeon: Shabbir Ardon M.D.;  Location: [facility];  Service: General   • AV FISTULA THROMBOLYSIS Right 8/11/2018    Procedure: AV FISTULA THROMBOLYSIS;  Surgeon: Shabbir Ardon M.D.;  Location: [facility];  Service: General   • AV FISTULA CREATION Right 12/9/2017    Procedure: AV FISTULA CREATION-ARM FOR GRAFT;  Surgeon: Lesly Jansen M.D.;  Location: [facility];  Service: General   • THROMBECTOMY  12/9/2017    Procedure: THROMBECTOMY;  Surgeon: Lesly MELCHOR  RAMON Jansen;  Location: [facility];  Service: General   • THROMBECTOMY Right 8/21/2016    Procedure: THROMBECTOMY - right AV fistula graft with grams;  Surgeon: Shabbir Ardon M.D.;  Location: [facility];  Service:    • ANGIOPLASTY BALLOON  8/21/2016    Procedure: ANGIOPLASTY BALLOON;  Surgeon: Shabbir Ardon M.D.;  Location: [facility];  Service:    • THROMBECTOMY Right 8/20/2016    Procedure: THROMBECTOMY AV GRAFT;  Surgeon: Shabbir Ardon M.D.;  Location: [facility];  Service:    • AV FISTULA CREATION Right 7/12/2016    Procedure: AV FISTULA CREATION WITH GRAFT BRACHIAL AXILLARY;  Surgeon: Shabbir Ardon M.D.;  Location: [facility];  Service:    • CLOSED REDUCTION Right 7/5/2016    Procedure: CLOSED REDUCTION- Hip ;  Surgeon: Michael Holman M.D.;  Location: [facility];  Service:    • HIP ARTHROPLASTY TOTAL Right 1/18/2016    Procedure: HIP ARTHROPLASTY TOTAL;  Surgeon: Michael Holman M.D.;  Location: [facility];  Service:    • AV FISTULA CREATION  2/3/2015    Performed by Shabbir Ardon M.D. at SUR[facility]ERY [facility]   • AV FISTULA CREATION  11/14/2014    Performed by Shabbir Ardon M.D. at [facility]   • AV FISTULA CREATION  9/9/2014    Performed by Shabbir Ardon M.D. at SUR[facility]ERY [facility]   • CATH PLACEMENT  9/9/2014    Performed by Shabbir Ardon M.D. at [facility]   • OTHER  5/2011    tracheostomy   • GASTROSCOPY-ENDO  4/27/2011    Performed by PALMIRA DOAN at ENDOSCOPY [facility]   • COLONOSCOPY WITH BIOPSY  4/20/2011    Performed by ALCIRA CRUZ at ENDOSCOPY [facility]   • GASTROSCOPY-ENDO  4/18/2011    Performed by ALCIRA CRUZ at ENDOSCOPY [facility]   • GASTROSCOPY-ENDO  4/10/2011    Performed by SYED JURADO at [facility]   • SCLEROTHERAPHY  4/10/2011    Performed by SYED JURADO at  END[facility]COPY [facility]   • OTHER ABDOMINAL SURGERY      kidney biopsy   • TRACHEOSTOMY         Family history   Family History   Problem Relation Age of Onset   • Heart Disease Mother    • Cancer Father        Medications:   Outpatient Medications Marked as Taking for the 10/1/21 encounter (Office Visit) with Quinn Chandler M.D.   Medication Sig Dispense Refill   • [START ON 10/19/2021] pregabalin (LYRICA) 50 MG capsule Take 1 Capsule by mouth 2 times a day for 28 days. 56 Capsule 1   • [START ON 10/19/2021] Oxycodone HCl 20 MG Tab Take 1 Tablet by mouth 4 times a day as needed (pain) for up to 28 days. 100 Tablet 0   • [START ON 11/16/2021] Oxycodone HCl 20 MG Tab Take 1 Tablet by mouth 4 times a day as needed (pain) for up to 28 days. 100 Tablet 0   • traZODone (DESYREL) 50 MG Tab TAKE 1 TABLET BY MOUTH EVERY NIGHT AT BEDTIME 90 tablet 1   • tizanidine (ZANAFLEX) 4 MG Tab TAKE 2 TABLETS BY MOUTH AT BEDTIME AS NEEDED FOR MUSCLE SPASMS 180 tablet 3   • amLODIPine (NORVASC) 10 MG Tab Take 1 tablet by mouth every day. 90 tablet 3   • lisinopril (PRINIVIL) 10 MG Tab Take 1 tablet by mouth every day. On day of dialysis, take this medication after dialysis. This medication is for high blood pressure 90 tablet 3   • calcium carbonate (TUMS) 500 MG Chew Tab Chew 1,000 mg 3 times a day. And after dialysis     • Cinacalcet HCl (SENSIPAR PO) Take 1 tablet by mouth 1 time a day as needed. During Dialysis      • promethazine (PHENERGAN) 25 MG Tab Take 1 Tab by mouth every 8 hours as needed for Nausea/Vomiting. 60 Tab 11   • Naloxone (NARCAN) 4 MG/0.1ML Liquid Administer 4 mg into affected nostril(S) one time as needed (for severe sleepiness or difficulty breathing due to opioid overdose) for up to 1 dose. 2 Each 0   • lacosamide (VIMPAT) 100 MG Tab tablet Take 100 mg by mouth 2 Times a Day.     • Cholecalciferol (VITAMIN D3) 91102 UNIT Cap Take 10,000 Units by mouth every day.     • omeprazole (PRILOSEC) 20 MG  delayed-release capsule Take 1 Cap by mouth every day. 30 Cap 3   • hydroxychloroquine (PLAQUENIL) 200 MG Tab Take 200 mg by mouth every morning.         Allergies:   Allergies   Allergen Reactions   • Lorazepam [Ativan] Anxiety and Unspecified     Patient becomes severely paranoid and agitated, hallucinates   • Morphine Itching     Tolerates Dilaudid   • Seasonal Runny Nose and Itching     Hay fever, sabiha brush       Social Hx:   Social History     Socioeconomic History   • Marital status: Single     Spouse name: Not on file   • Number of children: Not on file   • Years of education: Not on file   • Highest education level: Not on file   Occupational History   • Not on file   Tobacco Use   • Smoking status: Current Every Day Smoker     Packs/day: 0.50     Years: 18.00     Pack years: 9.00     Start date: 1995     Last attempt to quit: 6/13/2011     Years since quitting: 10.3   • Smokeless tobacco: Never Used   • Tobacco comment: vaping now   Vaping Use   • Vaping Use: Some days   • Substances: Flavoring   • Devices: Disposable   Substance and Sexual Activity   • Alcohol use: No     Alcohol/week: 0.0 oz   • Drug use: No     Types: Marijuana   • Sexual activity: Not Currently     Partners: Male   Other Topics Concern   •  Service No   • Blood Transfusions Yes   • Caffeine Concern No   • Occupational Exposure No   • Hobby Hazards No   • Sleep Concern Yes   • Stress Concern Yes   • Weight Concern Yes   • Special Diet Yes   • Back Care No   • Exercise Yes   • Bike Helmet No   • Seat Belt Yes   • Self-Exams Yes   Social History Narrative   • Not on file     Social Determinants of Health     Financial Resource Strain: Medium Risk   • Difficulty of Paying Living Expenses: Somewhat hard   Food Insecurity: Food Insecurity Present   • Worried About Running Out of Food in the Last Year: Often true   • Ran Out of Food in the Last Year: Often true   Transportation Needs: Unmet Transportation Needs   • Lack of  "Transportation (Medical): Yes   • Lack of Transportation (Non-Medical): Yes   Physical Activity:    • Days of Exercise per Week:    • Minutes of Exercise per Session:    Stress:    • Feeling of Stress :    Social Connections:    • Frequency of Communication with Friends and Family:    • Frequency of Social Gatherings with Friends and Family:    • Attends [employer] Services:    • Active Member of Clubs or Organizations:    • Attends Club or Organization Meetings:    • Marital Status:    Intimate Partner Violence:    • Fear of Current or Ex-Partner:    • Emotionally Abused:    • Physically Abused:    • Sexually Abused:        EXAMINATION     Physical Exam:   Vitals: /80 (BP Location: Right arm, Patient Position: Sitting, BP Cuff Size: Small adult)   Pulse 95   Temp 36.6 °C (97.8 °F) (Temporal)   Ht 1.651 m (5' 5\")   Wt 88.4 kg (194 lb 14.2 oz)   SpO2 93%     Constitutional:   Body Habitus: Body mass index is 32.43 kg/m².  Cooperation: Fully cooperates with exam  Appearance: Well-groomed no disheveled, in no acute distress, wearing a face mask   Respiratory-  breathing comfortable on room air, no wheezing.  Cardiovascular- trace edema in the lower extremities  Psychiatric- alert and oriented ×3. Normal affect.   Spine:   No skin lesions, warmth or erythema over the injection signs. PD catheter noted  Tenderness to palpation over the thoracic and lumbar paraspinals bilaterally, right greater than the left  Gait slow, steady without loss of balance.  No use of assist device.       MEDICAL DECISION MAKING    DATA    Labs:     UDS:  01/08/2021: positive for oxycodone and metabolites  03/12/2020 Positive for oxycodone and metabolites  12/19/2019 oxycodone and metabolites   08/22/2019 Oxycodone and metabolites (patient does not have a script for phenergan with codeine and this was negative) Therefore, consistent with medications  06/13/2019 Negative for oxycodone, positive for other oxycodone " metabolites  04/16/2019 Positive for oxymorphone  01/17/2019 Oxycodone and metabolites as expected  10/02/2018 Oxycodone and metabolites, also fentanyl    01/09/2019: Hep B surface ag negative  01/08/2019: GFR 7; BUN 23 Cr 6.68, Plt 160    12/15/2018 CRP 3.03    GFR 12/06/2020 4  GFR 08/21/2019, 4  08/21/2019 WBC 4.6, Hb 10.1, Hct 31.6, Plt 156    BMP 12/16/2018  Na 136, Potassium 4.4, chloride 96, CO2 23 Glucose 83, BUN 55H, Cr 9.44H, Calcium 9.9, Anion gap 17.0H    CBC 3.3, Hb 9.6, Hct 31.3, Plt 115    Lab Results   Component Value Date/Time    HBA1C 4.9 06/07/2018 02:29 AM        Imaging:   I reviewed the following radiology reports reviewed  GAYATHRI 08/20/2019  Echocardiography Laboratory  CONCLUSIONS  LV EF    65%.  Structurally normal tricuspid valve.  Trace tricuspid regurgitation.  Port catheter noted in the SVC and right atrium.  Tip support appears bright, no obvious vegetation.  The tip of the port abuts the tricuspid valve.  Recommend the port be pulled back approximately 2 cm.  No evidence of endocarditis.    Xray hip with pelvis-left 07/28/2019  Left femoral head heterogeneous density is compatible with known diagnosis of avascular necrosis. There is no joint space narrowing or spurring    Right arthroplasty without evident complication.    Xray left foot 01/21/2019  Nondisplaced transverse fracture through the base of the fifth metatarsal.    MRI lumbar spine 1/1/19  1.  Diffusely decreased signal again seen throughout the marrow space consistent with red marrow conversion, likely secondary to chronic anemia.  2.  No significant disc bulging, central canal narrowing, or foraminal narrowing.  3.  No lumbar spine fracture or subluxation.    MRI brain 12/13/2018  1.  There is no hippocampal sclerosis.  2.  There is no evidence of cortical dysplasia or neuronal migrational abnormalities.  3.  A few nonspecific T2 hyperintensities in the supratentorial white matter likely representing nonspecific foci of  gliosis, chronic ischemia and demyelination. This is unchanged since the previous MRI dated 2/23/2012.       Chest xray 06/07/2018: Minimal right-sided opacities as described above, suggesting pneumonia.               Results for orders placed during the hospital encounter of 07/19/17   MR-LUMBAR SPINE-W/O    Impression 1.  Diffuse decreased bone marrow signal intensity throughout the lumbar spine and sacrum, presumably secondary to chronic anemia.    2.  Otherwise unremarkable MRI scan of the lumbar spine without contrast.                                    DIAGNOSIS   Visit Diagnoses     ICD-10-CM   1. Peripheral polyneuropathy  G62.9   2. Avascular necrosis of bone of hip, left (HCC)  M87.052   3. Fibromyalgia  M79.7   4. Chronic bilateral low back pain without sciatica  M54.50    G89.29         ASSESSMENT and PLAN:     Debby Carrizales 41 y.o. Female returns for follow-up of her chronic pain related to lupus, AVN hips, low back and peripheral neuropathy    Debby was seen today for follow-up.    Diagnoses and all orders for this visit:    Peripheral polyneuropathy  -     pregabalin (LYRICA) 50 MG capsule; Take 1 Capsule by mouth 2 times a day for 28 days.  -     Oxycodone HCl 20 MG Tab; Take 1 Tablet by mouth 4 times a day as needed (pain) for up to 28 days.  -     Oxycodone HCl 20 MG Tab; Take 1 Tablet by mouth 4 times a day as needed (pain) for up to 28 days.  -     Consent for Opiate Prescription  -     Controlled Substance Treatment Agreement    Avascular necrosis of bone of hip, left (HCC)  -     pregabalin (LYRICA) 50 MG capsule; Take 1 Capsule by mouth 2 times a day for 28 days.  -     Oxycodone HCl 20 MG Tab; Take 1 Tablet by mouth 4 times a day as needed (pain) for up to 28 days.  -     Oxycodone HCl 20 MG Tab; Take 1 Tablet by mouth 4 times a day as needed (pain) for up to 28 days.  -     Consent for Opiate Prescription  -     Controlled Substance Treatment Agreement    Fibromyalgia  -      "pregabalin (LYRICA) 50 MG capsule; Take 1 Capsule by mouth 2 times a day for 28 days.    Chronic bilateral low back pain without sciatica  -     Oxycodone HCl 20 MG Tab; Take 1 Tablet by mouth 4 times a day as needed (pain) for up to 28 days.  -     Oxycodone HCl 20 MG Tab; Take 1 Tablet by mouth 4 times a day as needed (pain) for up to 28 days.  -     Consent for Opiate Prescription  -     Controlled Substance Treatment Agreement           1. Discussed that she is doing well with transitioning to PD an reports that her edema is likely to resolve over time.  2. Continue lyrica 50mg po bid with refill.  3.  reviewed.  Discussed plan to reduce oxycodone #100 for the next 28 days.  Scripts given for next two months.  4. Follow-up with PCP and specialists.    In prescribing controlled substances to this patient, I certify that I have obtained and reviewed the medical history of Debby Carrizales. I have also made a good ly effort to obtain applicable records from other providers who have treated the patient and records demonstrating the following: report of \"drug-seeking behavior,\" although I suspect that she has organic reasons for pain and will continue to monitor as appropriate.     I have conducted a physical exam and documented it. I have reviewed Ms. Carrizales’s prescription history as maintained by the [place] Prescription Monitoring Program.     I have assessed the patient’s risk for abuse, dependency, and addiction using the validated Opioid Risk Tool available at https://www.mdcalc.com/trfgou-dxfe-kvnu-ort-narcotic-abuse.     Given the above, I believe the benefits of controlled substance therapy outweigh the risks. The reasons for prescribing controlled substances include non-narcotic, oral analgesic alternatives have been inadequate for pain control and in my professional opinion, controlled substances are the only reasonable choice for this patient because she has limitations to medication " choices due to being on dialysis.   Accordingly, I have discussed the risk and benefits, treatment plan, and alternative therapies with the patient.     Follow up: 10 weeks    Please note that this dictation was created using voice recognition software. I have made every reasonable attempt to correct obvious errors but there may be errors of grammar and content that I may have overlooked prior to finalization of this note.      Quinn Chandler MD  Interventional Spine and Sports Physiatry  Physical Medicine and Rehabilitation  [facility]

## 2021-11-01 ENCOUNTER — APPOINTMENT (OUTPATIENT)
Dept: RADIOLOGY | Facility: MEDICAL CENTER | Age: 41
DRG: 100 | End: 2021-11-01
Attending: EMERGENCY MEDICINE
Payer: MEDICARE

## 2021-11-01 ENCOUNTER — HOSPITAL ENCOUNTER (INPATIENT)
Facility: MEDICAL CENTER | Age: 41
LOS: 3 days | DRG: 100 | End: 2021-11-05
Attending: EMERGENCY MEDICINE | Admitting: HOSPITALIST
Payer: MEDICARE

## 2021-11-01 DIAGNOSIS — G40.909 SEIZURE DISORDER (HCC): ICD-10-CM

## 2021-11-01 LAB
ALBUMIN SERPL BCP-MCNC: 4.2 G/DL (ref 3.2–4.9)
ALBUMIN/GLOB SERPL: 1.6 G/DL
ALP SERPL-CCNC: 166 U/L (ref 30–99)
ALT SERPL-CCNC: 5 U/L (ref 2–50)
AMMONIA PLAS-SCNC: 19 UMOL/L (ref 11–45)
ANION GAP SERPL CALC-SCNC: 19 MMOL/L (ref 7–16)
ANION GAP SERPL CALC-SCNC: 19 MMOL/L (ref 7–16)
AST SERPL-CCNC: 9 U/L (ref 12–45)
BASOPHILS # BLD AUTO: 1.6 % (ref 0–1.8)
BASOPHILS # BLD: 0.07 K/UL (ref 0–0.12)
BILIRUB SERPL-MCNC: 0.3 MG/DL (ref 0.1–1.5)
BUN SERPL-MCNC: 70 MG/DL (ref 8–22)
BUN SERPL-MCNC: 72 MG/DL (ref 8–22)
CALCIUM SERPL-MCNC: 8.2 MG/DL (ref 8.5–10.5)
CALCIUM SERPL-MCNC: 8.3 MG/DL (ref 8.5–10.5)
CHLORIDE SERPL-SCNC: 98 MMOL/L (ref 96–112)
CHLORIDE SERPL-SCNC: 99 MMOL/L (ref 96–112)
CO2 SERPL-SCNC: 23 MMOL/L (ref 20–33)
CO2 SERPL-SCNC: 23 MMOL/L (ref 20–33)
CREAT SERPL-MCNC: 16.57 MG/DL (ref 0.5–1.4)
CREAT SERPL-MCNC: 18.65 MG/DL (ref 0.5–1.4)
EOSINOPHIL # BLD AUTO: 0.04 K/UL (ref 0–0.51)
EOSINOPHIL NFR BLD: 0.9 % (ref 0–6.9)
ERYTHROCYTE [DISTWIDTH] IN BLOOD BY AUTOMATED COUNT: 39.2 FL (ref 35.9–50)
GLOBULIN SER CALC-MCNC: 2.6 G/DL (ref 1.9–3.5)
GLUCOSE BLD-MCNC: 84 MG/DL (ref 65–99)
GLUCOSE SERPL-MCNC: 79 MG/DL (ref 65–99)
GLUCOSE SERPL-MCNC: 79 MG/DL (ref 65–99)
HAV IGM SERPL QL IA: NORMAL
HBV CORE IGM SER QL: NORMAL
HBV SURFACE AB SERPL IA-ACNC: <3.5 MIU/ML (ref 0–10)
HBV SURFACE AG SER QL: NORMAL
HCT VFR BLD AUTO: 34.3 % (ref 37–47)
HCV AB SER QL: NORMAL
HGB BLD-MCNC: 11.3 G/DL (ref 12–16)
IMM GRANULOCYTES # BLD AUTO: 0.02 K/UL (ref 0–0.11)
IMM GRANULOCYTES NFR BLD AUTO: 0.5 % (ref 0–0.9)
LYMPHOCYTES # BLD AUTO: 1.06 K/UL (ref 1–4.8)
LYMPHOCYTES NFR BLD: 24.3 % (ref 22–41)
MCH RBC QN AUTO: 31 PG (ref 27–33)
MCHC RBC AUTO-ENTMCNC: 32.9 G/DL (ref 33.6–35)
MCV RBC AUTO: 94.2 FL (ref 81.4–97.8)
MONOCYTES # BLD AUTO: 0.42 K/UL (ref 0–0.85)
MONOCYTES NFR BLD AUTO: 9.6 % (ref 0–13.4)
NEUTROPHILS # BLD AUTO: 2.76 K/UL (ref 2–7.15)
NEUTROPHILS NFR BLD: 63.1 % (ref 44–72)
NRBC # BLD AUTO: 0 K/UL
NRBC BLD-RTO: 0 /100 WBC
PLATELET # BLD AUTO: 107 K/UL (ref 164–446)
PMV BLD AUTO: 10.5 FL (ref 9–12.9)
POTASSIUM SERPL-SCNC: 6.1 MMOL/L (ref 3.6–5.5)
POTASSIUM SERPL-SCNC: 6.8 MMOL/L (ref 3.6–5.5)
PROT SERPL-MCNC: 6.8 G/DL (ref 6–8.2)
RBC # BLD AUTO: 3.64 M/UL (ref 4.2–5.4)
SODIUM SERPL-SCNC: 140 MMOL/L (ref 135–145)
SODIUM SERPL-SCNC: 141 MMOL/L (ref 135–145)
WBC # BLD AUTO: 4.4 K/UL (ref 4.8–10.8)

## 2021-11-01 PROCEDURE — 86706 HEP B SURFACE ANTIBODY: CPT

## 2021-11-01 PROCEDURE — 96375 TX/PRO/DX INJ NEW DRUG ADDON: CPT

## 2021-11-01 PROCEDURE — A9270 NON-COVERED ITEM OR SERVICE: HCPCS | Performed by: HOSPITALIST

## 2021-11-01 PROCEDURE — 99220 PR INITIAL OBSERVATION CARE,LEVL III: CPT | Performed by: HOSPITALIST

## 2021-11-01 PROCEDURE — 90945 DIALYSIS ONE EVALUATION: CPT

## 2021-11-01 PROCEDURE — 36415 COLL VENOUS BLD VENIPUNCTURE: CPT

## 2021-11-01 PROCEDURE — C9254 INJECTION, LACOSAMIDE: HCPCS | Performed by: EMERGENCY MEDICINE

## 2021-11-01 PROCEDURE — 99285 EMERGENCY DEPT VISIT HI MDM: CPT

## 2021-11-01 PROCEDURE — 700101 HCHG RX REV CODE 250: Performed by: HOSPITALIST

## 2021-11-01 PROCEDURE — 82962 GLUCOSE BLOOD TEST: CPT

## 2021-11-01 PROCEDURE — G0378 HOSPITAL OBSERVATION PER HR: HCPCS

## 2021-11-01 PROCEDURE — 82140 ASSAY OF AMMONIA: CPT

## 2021-11-01 PROCEDURE — 700111 HCHG RX REV CODE 636 W/ 250 OVERRIDE (IP): Performed by: HOSPITALIST

## 2021-11-01 PROCEDURE — 700111 HCHG RX REV CODE 636 W/ 250 OVERRIDE (IP): Performed by: EMERGENCY MEDICINE

## 2021-11-01 PROCEDURE — 700102 HCHG RX REV CODE 250 W/ 637 OVERRIDE(OP): Performed by: HOSPITALIST

## 2021-11-01 PROCEDURE — 80074 ACUTE HEPATITIS PANEL: CPT

## 2021-11-01 PROCEDURE — 70450 CT HEAD/BRAIN W/O DYE: CPT | Mod: ME

## 2021-11-01 PROCEDURE — 96365 THER/PROPH/DIAG IV INF INIT: CPT

## 2021-11-01 PROCEDURE — 99215 OFFICE O/P EST HI 40 MIN: CPT | Performed by: PSYCHIATRY & NEUROLOGY

## 2021-11-01 PROCEDURE — 85025 COMPLETE CBC W/AUTO DIFF WBC: CPT

## 2021-11-01 PROCEDURE — 80048 BASIC METABOLIC PNL TOTAL CA: CPT

## 2021-11-01 PROCEDURE — 80053 COMPREHEN METABOLIC PANEL: CPT

## 2021-11-01 RX ORDER — SEVELAMER CARBONATE 800 MG/1
3200 TABLET, FILM COATED ORAL
Status: DISCONTINUED | OUTPATIENT
Start: 2021-11-02 | End: 2021-11-05 | Stop reason: HOSPADM

## 2021-11-01 RX ORDER — LISINOPRIL 20 MG/1
20 TABLET ORAL
Status: ON HOLD | COMMUNITY
End: 2022-02-05

## 2021-11-01 RX ORDER — SEVELAMER CARBONATE 800 MG/1
1600 TABLET, FILM COATED ORAL 2 TIMES DAILY PRN
Status: DISCONTINUED | OUTPATIENT
Start: 2021-11-01 | End: 2021-11-05 | Stop reason: HOSPADM

## 2021-11-01 RX ORDER — DEXTROSE MONOHYDRATE 25 G/50ML
50 INJECTION, SOLUTION INTRAVENOUS ONCE
Status: COMPLETED | OUTPATIENT
Start: 2021-11-01 | End: 2021-11-01

## 2021-11-01 RX ORDER — PROCHLORPERAZINE EDISYLATE 5 MG/ML
5-10 INJECTION INTRAMUSCULAR; INTRAVENOUS EVERY 4 HOURS PRN
Status: DISCONTINUED | OUTPATIENT
Start: 2021-11-01 | End: 2021-11-05 | Stop reason: HOSPADM

## 2021-11-01 RX ORDER — LACOSAMIDE 100 MG/1
100 TABLET ORAL 2 TIMES DAILY
Status: DISCONTINUED | OUTPATIENT
Start: 2021-11-01 | End: 2021-11-05 | Stop reason: HOSPADM

## 2021-11-01 RX ORDER — PROMETHAZINE HYDROCHLORIDE 25 MG/1
12.5-25 TABLET ORAL EVERY 4 HOURS PRN
Status: DISCONTINUED | OUTPATIENT
Start: 2021-11-01 | End: 2021-11-05 | Stop reason: HOSPADM

## 2021-11-01 RX ORDER — ESCITALOPRAM OXALATE 10 MG/1
10 TABLET ORAL DAILY
COMMUNITY
End: 2021-11-23

## 2021-11-01 RX ORDER — ONDANSETRON 4 MG/1
4 TABLET, ORALLY DISINTEGRATING ORAL EVERY 4 HOURS PRN
Status: DISCONTINUED | OUTPATIENT
Start: 2021-11-01 | End: 2021-11-05 | Stop reason: HOSPADM

## 2021-11-01 RX ORDER — AMOXICILLIN 250 MG
2 CAPSULE ORAL 2 TIMES DAILY
Status: DISCONTINUED | OUTPATIENT
Start: 2021-11-01 | End: 2021-11-05 | Stop reason: HOSPADM

## 2021-11-01 RX ORDER — SEVELAMER CARBONATE 800 MG/1
TABLET, FILM COATED ORAL SEE ADMIN INSTRUCTIONS
Status: DISCONTINUED | OUTPATIENT
Start: 2021-11-01 | End: 2021-11-01

## 2021-11-01 RX ORDER — POLYETHYLENE GLYCOL 3350 17 G/17G
1 POWDER, FOR SOLUTION ORAL
Status: DISCONTINUED | OUTPATIENT
Start: 2021-11-01 | End: 2021-11-05 | Stop reason: HOSPADM

## 2021-11-01 RX ORDER — PREGABALIN 25 MG/1
50 CAPSULE ORAL 2 TIMES DAILY
Status: DISCONTINUED | OUTPATIENT
Start: 2021-11-01 | End: 2021-11-05 | Stop reason: HOSPADM

## 2021-11-01 RX ORDER — CALCIUM GLUCONATE 20 MG/ML
2 INJECTION, SOLUTION INTRAVENOUS ONCE
Status: COMPLETED | OUTPATIENT
Start: 2021-11-01 | End: 2021-11-02

## 2021-11-01 RX ORDER — AMLODIPINE BESYLATE 10 MG/1
10 TABLET ORAL DAILY
Status: DISCONTINUED | OUTPATIENT
Start: 2021-11-02 | End: 2021-11-05 | Stop reason: HOSPADM

## 2021-11-01 RX ORDER — ONDANSETRON 2 MG/ML
4 INJECTION INTRAMUSCULAR; INTRAVENOUS EVERY 4 HOURS PRN
Status: DISCONTINUED | OUTPATIENT
Start: 2021-11-01 | End: 2021-11-05 | Stop reason: HOSPADM

## 2021-11-01 RX ORDER — LISINOPRIL 20 MG/1
20 TABLET ORAL DAILY
Status: DISCONTINUED | OUTPATIENT
Start: 2021-11-02 | End: 2021-11-05 | Stop reason: HOSPADM

## 2021-11-01 RX ORDER — ACETAMINOPHEN 325 MG/1
650 TABLET ORAL EVERY 6 HOURS PRN
Status: DISCONTINUED | OUTPATIENT
Start: 2021-11-01 | End: 2021-11-05 | Stop reason: HOSPADM

## 2021-11-01 RX ORDER — PROMETHAZINE HYDROCHLORIDE 25 MG/1
12.5-25 SUPPOSITORY RECTAL EVERY 4 HOURS PRN
Status: DISCONTINUED | OUTPATIENT
Start: 2021-11-01 | End: 2021-11-05 | Stop reason: HOSPADM

## 2021-11-01 RX ORDER — LACOSAMIDE 10 MG/ML
200 INJECTION, SOLUTION INTRAVENOUS ONCE
Status: COMPLETED | OUTPATIENT
Start: 2021-11-01 | End: 2021-11-01

## 2021-11-01 RX ORDER — ESCITALOPRAM OXALATE 10 MG/1
10 TABLET ORAL DAILY
Status: DISCONTINUED | OUTPATIENT
Start: 2021-11-02 | End: 2021-11-05 | Stop reason: HOSPADM

## 2021-11-01 RX ORDER — BISACODYL 10 MG
10 SUPPOSITORY, RECTAL RECTAL
Status: DISCONTINUED | OUTPATIENT
Start: 2021-11-01 | End: 2021-11-05 | Stop reason: HOSPADM

## 2021-11-01 RX ADMIN — DEXTROSE MONOHYDRATE 50 ML: 25 INJECTION, SOLUTION INTRAVENOUS at 23:38

## 2021-11-01 RX ADMIN — LACOSAMIDE 100 MG: 100 TABLET, FILM COATED ORAL at 21:33

## 2021-11-01 RX ADMIN — CALCIUM GLUCONATE 2 G: 20 INJECTION, SOLUTION INTRAVENOUS at 23:57

## 2021-11-01 RX ADMIN — PREGABALIN 50 MG: 25 CAPSULE ORAL at 21:33

## 2021-11-01 RX ADMIN — PATIROMER 8.4 G: 8.4 POWDER, FOR SUSPENSION ORAL at 23:56

## 2021-11-01 RX ADMIN — LACOSAMIDE 200 MG: 10 INJECTION INTRAVENOUS at 13:58

## 2021-11-01 RX ADMIN — SODIUM BICARBONATE 50 MEQ: 84 INJECTION INTRAVENOUS at 23:38

## 2021-11-01 ASSESSMENT — FIBROSIS 4 INDEX: FIB4 SCORE: 1.090909090909090909

## 2021-11-01 NOTE — ED NOTES
Pt's mother to  pt. Pt still unable to state where she lives or her phone pass code. Road tested pt-- slightly unsteady.  EDP aware.

## 2021-11-01 NOTE — ED PROVIDER NOTES
"ED Provider Note    CHIEF COMPLAINT  Chief Complaint   Patient presents with   • Seizure     pt was at dialysis when had seizure. pt post ictal- oriented to person, place and time but not event. pt has \"foggy brain\".        HPI  Debby Carrizales is a 41 y.o. female who presents for evaluation of potential seizure.  The patient is a complex medical history as listed below.  This includes end-stage renal disease and history of epilepsy.  She apparently was at her regularly scheduled peritoneal dialysis today and had a witnessed tonic-clonic seizure.  She was somewhat confused upon arrival but report is that she may have missed her seizure medications recently.  On arrival the patient is still somewhat confused.  She does not appear to be actively seizing.  She is moving all extremities.  Denies any trauma to the head neck chest abdomen or pelvis    REVIEW OF SYSTEMS  See HPI for further details.  No high fevers chills night sweats weight loss all other systems are negative.     PAST MEDICAL HISTORY  Past Medical History:   Diagnosis Date   • Status epilepticus (HCC) 12/13/2018    last seizure 2 mos ago 03/2021   • Sepsis due to pneumonia ([facility]) 6/7/2018   • Pyelonephritis 11/21/2017   • Avascular necrosis of bones of both hips ([facility]) 10/10/2016   • ESBL (extended spectrum beta-lactamase) producing bacteria infection 8/25/2014   • Pneumonia    • Clostridium difficile colitis 5/3/2011   • High anion gap metabolic acidosis 4/2/2011   • Anemia    • Arthritis     all joints,r/t lupus   • Blood clotting disorder (HCC)     in AV Graft   • Dialysis patient    • Fall    • Fibromyalgia    • Hypertension    • Lupus (HCC)    • Psychiatric disorder     anxiety, depression   • Renal failure        FAMILY HISTORY  Noncontributory  SOCIAL HISTORY  Social History     Socioeconomic History   • Marital status: Single     Spouse name: Not on file   • Number of children: Not on file   • Years of education: Not on file   • " Highest education level: Not on file   Occupational History   • Not on file   Tobacco Use   • Smoking status: Current Every Day Smoker     Packs/day: 0.50     Years: 18.00     Pack years: 9.00     Start date: 1995     Last attempt to quit: 6/13/2011     Years since quitting: 10.3   • Smokeless tobacco: Never Used   • Tobacco comment: vaping now   Vaping Use   • Vaping Use: Some days   • Substances: Flavoring   • Devices: Disposable   Substance and Sexual Activity   • Alcohol use: No     Alcohol/week: 0.0 oz   • Drug use: No     Types: Marijuana   • Sexual activity: Not Currently     Partners: Male   Other Topics Concern   •  Service No   • Blood Transfusions Yes   • Caffeine Concern No   • Occupational Exposure No   • Hobby Hazards No   • Sleep Concern Yes   • Stress Concern Yes   • Weight Concern Yes   • Special Diet Yes   • Back Care No   • Exercise Yes   • Bike Helmet No   • Seat Belt Yes   • Self-Exams Yes   Social History Narrative   • Not on file     Social Determinants of Health     Financial Resource Strain: Medium Risk   • Difficulty of Paying Living Expenses: Somewhat hard   Food Insecurity: Food Insecurity Present   • Worried About Running Out of Food in the Last Year: Often true   • Ran Out of Food in the Last Year: Often true   Transportation Needs: Unmet Transportation Needs   • Lack of Transportation (Medical): Yes   • Lack of Transportation (Non-Medical): Yes   Physical Activity:    • Days of Exercise per Week:    • Minutes of Exercise per Session:    Stress:    • Feeling of Stress :    Social Connections:    • Frequency of Communication with Friends and Family:    • Frequency of Social Gatherings with Friends and Family:    • Attends Jain Services:    • Active Member of Clubs or Organizations:    • Attends Club or Organization Meetings:    • Marital Status:    Intimate Partner Violence:    • Fear of Current or Ex-Partner:    • Emotionally Abused:    • Physically Abused:    • Sexually  Abused:        SURGICAL HISTORY  Past Surgical History:   Procedure Laterality Date   • CATH PLACEMENT CAPD N/A 5/5/2021    Procedure: INSERTION, CATHETER, CAPD;  Surgeon: Víctor Berg M.D.;  Location: [facility];  Service: Vascular   • THROMBECTOMY Right 12/7/2020    Procedure: Right upper extremity arteriovenous graft thrombectomy;  Surgeon: Daniel Poole M.D.;  Location: [facility];  Service: Vascular   • CATH PLACEMENT Left 12/7/2020    Procedure: port-a-catheter removal;  Surgeon: Daniel Poole M.D.;  Location: [facility];  Service: Vascular   • GAYATHRI N/A 8/20/2019    Procedure: ECHOCARDIOGRAM, TRANSESOPHAGEAL;  Surgeon: Marta Elaine M.D.;  Location: [facility];  Service: Cardiac   • AV FISTULA REVISION Right 8/11/2018    Procedure: AV FISTULA REVISION- Graft and Thrombectomy;  Surgeon: Shabbir Ardon M.D.;  Location: [facility];  Service: General   • AV FISTULA THROMBOLYSIS Right 8/11/2018    Procedure: AV FISTULA THROMBOLYSIS;  Surgeon: Shabbir Ardon M.D.;  Location: [facility];  Service: General   • AV FISTULA CREATION Right 12/9/2017    Procedure: AV FISTULA CREATION-ARM FOR GRAFT;  Surgeon: Lesly Jansen M.D.;  Location: [facility];  Service: General   • THROMBECTOMY  12/9/2017    Procedure: THROMBECTOMY;  Surgeon: Lesyl Jansen M.D.;  Location: [facility];  Service: General   • THROMBECTOMY Right 8/21/2016    Procedure: THROMBECTOMY - right AV fistula graft with grams;  Surgeon: Shabbir Ardon M.D.;  Location: [facility];  Service:    • ANGIOPLASTY BALLOON  8/21/2016    Procedure: ANGIOPLASTY BALLOON;  Surgeon: Shabbir Ardon M.D.;  Location: [facility];  Service:    • THROMBECTOMY Right 8/20/2016    Procedure: THROMBECTOMY AV GRAFT;  Surgeon: Shabbir Ardon M.D.;  Location: [facility];  Service:    • AV FISTULA CREATION Right 7/12/2016     Procedure: AV FISTULA CREATION WITH GRAFT BRACHIAL AXILLARY;  Surgeon: Shabbir Ardon M.D.;  Location: SURGERY [facility];  Service:    • CLOSED REDUCTION Right 7/5/2016    Procedure: CLOSED REDUCTION- Hip ;  Surgeon: Michael Holman M.D.;  Location: SURGERY [facility];  Service:    • HIP ARTHROPLASTY TOTAL Right 1/18/2016    Procedure: HIP ARTHROPLASTY TOTAL;  Surgeon: Michael Holman M.D.;  Location: SURGERY [facility];  Service:    • AV FISTULA CREATION  2/3/2015    Performed by Shabbir Ardon M.D. at SURGERY [facility]   • AV FISTULA CREATION  11/14/2014    Performed by Shabbir Ardon M.D. at SURGERY [facility]   • AV FISTULA CREATION  9/9/2014    Performed by Shabbir Ardon M.D. at [facility]   • CATH PLACEMENT  9/9/2014    Performed by Shabbir Ardon M.D. at SURGERY [facility]   • OTHER  5/2011    tracheostomy   • GASTROSCOPY-ENDO  4/27/2011    Performed by PALMIRA DOAN at ENDOSCOPY [facility]   • COLONOSCOPY WITH BIOPSY  4/20/2011    Performed by ALCIRA CRUZ at ENDOSCOPY [facility]   • GASTROSCOPY-ENDO  4/18/2011    Performed by ALCIRA CRUZ at ENDOSCOPY [facility]   • GASTROSCOPY-ENDO  4/10/2011    Performed by SYED JURADO at ENDOSCOPY [facility]   • SCLEROTHERAPHY  4/10/2011    Performed by SYED JURADO at ENDOSCOPY [facility]   • OTHER ABDOMINAL SURGERY      kidney biopsy   • TRACHEOSTOMY         CURRENT MEDICATIONS  Home Medications     Reviewed by Adore Flores R.N. (Registered Nurse) on 11/01/21 at 1254  Med List Status: Partial   Medication Last Dose Status   amLODIPine (NORVASC) 10 MG Tab  Active   calcium carbonate (TUMS) 500 MG Chew Tab  Active   cephALEXin (KEFLEX) 500 MG Cap  Active   Cholecalciferol (VITAMIN D3) 40156 UNIT Cap  Active   Cinacalcet HCl (SENSIPAR PO)  Active   Docusate Sodium (STOOL SOFTENER LAXATIVE PO)  Active   ferrous sulfate 325 (65 FE) MG tablet  Active  "  fluticasone (FLONASE) 50 MCG/ACT nasal spray  Active   hydroxychloroquine (PLAQUENIL) 200 MG Tab  Active   lacosamide (VIMPAT) 100 MG Tab tablet  Active   lactulose 10 GM/15ML Solution  Active   lisinopril (PRINIVIL) 10 MG Tab  Active   Naloxone (NARCAN) 4 MG/0.1ML Liquid  Active   omeprazole (PRILOSEC) 20 MG delayed-release capsule  Active   Oxycodone HCl 20 MG Tab  Active   Oxycodone HCl 20 MG Tab  Active   pregabalin (LYRICA) 50 MG capsule  Active   promethazine (PHENERGAN) 25 MG Tab  Active   RENVELA 800 MG Tab tablet  Active   tizanidine (ZANAFLEX) 4 MG Tab  Active   traZODone (DESYREL) 50 MG Tab  Active                ALLERGIES  Allergies   Allergen Reactions   • Lorazepam [Ativan] Anxiety and Unspecified     Patient becomes severely paranoid and agitated, hallucinates   • Morphine Itching     Tolerates Dilaudid   • Seasonal Runny Nose and Itching     Hay fever, sabiha brush       PHYSICAL EXAM  VITAL SIGNS: BP (!) 179/92   Pulse 76   Resp 16   Ht 1.651 m (5' 5\")   Wt 88 kg (194 lb)   SpO2 93%   BMI 32.28 kg/m²       Constitutional: Appears chronically ill  HENT: Normocephalic, Atraumatic, Bilateral external ears normal, Oropharynx moist, No oral exudates, Nose normal.   Eyes: PERRLA, EOMI, Conjunctiva normal, No discharge.   Neck: Normal range of motion, No tenderness, Supple, No stridor.   Lymphatic: No lymphadenopathy noted.   Cardiovascular: Normal heart rate, Normal rhythm, No murmurs, No rubs, No gallops.   Thorax & Lungs: Normal breath sounds, No respiratory distress, No wheezing, No chest tenderness.   Abdomen: Bowel sounds normal, Soft, peritoneal dialysis catheter is clean dry and intact.   Skin: Warm, Dry, No erythema, No rash.   Back: No tenderness, No CVA tenderness.   Extremities: Intact distal pulses, No edema, No tenderness, No cyanosis, No clubbing.   Neurologic: No active seizures GCS 15 NIH score of 0 disoriented to time and place normal motor function, Normal sensory function, No " focal deficits noted.   Psychiatric: Confused confused l.     Results for orders placed or performed during the hospital encounter of 11/01/21   CBC WITH DIFFERENTIAL   Result Value Ref Range    WBC 4.4 (L) 4.8 - 10.8 K/uL    RBC 3.64 (L) 4.20 - 5.40 M/uL    Hemoglobin 11.3 (L) 12.0 - 16.0 g/dL    Hematocrit 34.3 (L) 37.0 - 47.0 %    MCV 94.2 81.4 - 97.8 fL    MCH 31.0 27.0 - 33.0 pg    MCHC 32.9 (L) 33.6 - 35.0 g/dL    RDW 39.2 35.9 - 50.0 fL    Platelet Count 107 (L) 164 - 446 K/uL    MPV 10.5 9.0 - 12.9 fL    Neutrophils-Polys 63.10 44.00 - 72.00 %    Lymphocytes 24.30 22.00 - 41.00 %    Monocytes 9.60 0.00 - 13.40 %    Eosinophils 0.90 0.00 - 6.90 %    Basophils 1.60 0.00 - 1.80 %    Immature Granulocytes 0.50 0.00 - 0.90 %    Nucleated RBC 0.00 /100 WBC    Neutrophils (Absolute) 2.76 2.00 - 7.15 K/uL    Lymphs (Absolute) 1.06 1.00 - 4.80 K/uL    Monos (Absolute) 0.42 0.00 - 0.85 K/uL    Eos (Absolute) 0.04 0.00 - 0.51 K/uL    Baso (Absolute) 0.07 0.00 - 0.12 K/uL    Immature Granulocytes (abs) 0.02 0.00 - 0.11 K/uL    NRBC (Absolute) 0.00 K/uL   Comp Metabolic Panel   Result Value Ref Range    Sodium 140 135 - 145 mmol/L    Potassium 6.1 (H) 3.6 - 5.5 mmol/L    Chloride 98 96 - 112 mmol/L    Co2 23 20 - 33 mmol/L    Anion Gap 19.0 (H) 7.0 - 16.0    Glucose 79 65 - 99 mg/dL    Bun 70 (H) 8 - 22 mg/dL    Creatinine 16.57 (HH) 0.50 - 1.40 mg/dL    Calcium 8.3 (L) 8.5 - 10.5 mg/dL    AST(SGOT) 9 (L) 12 - 45 U/L    ALT(SGPT) 5 2 - 50 U/L    Alkaline Phosphatase 166 (H) 30 - 99 U/L    Total Bilirubin 0.3 0.1 - 1.5 mg/dL    Albumin 4.2 3.2 - 4.9 g/dL    Total Protein 6.8 6.0 - 8.2 g/dL    Globulin 2.6 1.9 - 3.5 g/dL    A-G Ratio 1.6 g/dL   ESTIMATED GFR   Result Value Ref Range    GFR If  3 (A) >60 mL/min/1.73 m 2    GFR If Non African American 2 (A) >60 mL/min/1.73 m 2   POCT glucose device results   Result Value Ref Range    Glucose - Accu-Ck 84 65 - 99 mg/dL        COURSE & MEDICAL DECISION  "MAKING  Pertinent Labs & Imaging studies reviewed. (See chart for details)  Patient presented here with seizure and history of seizure disorder that cut her peritoneal dialysis short.  She was initially quite confused and I thought she was simply postictal.  After several hours she remained confused.  We had her grandmother who is her only family member involved with her come to the bedside and she reported that the patient has clearly \"off.  \"The patient never quite cleared and is still disoriented.  I do not feel that she can care for herself.  Reviewed the case with Dr. Shaver her nephrologist who firmed that the patient is clearly not acting normally.  The patient denies any drug use and she does not make any urine to do a urine toxicology.  Dr. Shaver recommended admitting to a private peritoneal dialysis room and she will consult on the patient.  She thinks the patient might have some ongoing uremia leading to some mental status changes.  Consultation with Dr. Albert with the hospitalist service was requested for admission.  I also discussed the case at her request with Dr. Saleh with neurology      FINAL IMPRESSION  1.  Seizure with history of seizure disorder  2.  Noncompliance causing #1  3.  Chronic end-stage renal disease   4. Metabolic encephalopathy      Electronically signed by: Coy Ocampo M.D., 11/1/2021 1:14 PM    "

## 2021-11-01 NOTE — ED TRIAGE NOTES
"Chief Complaint   Patient presents with   • Seizure     pt was at dialysis when had seizure. pt post ictal- oriented to person, place and time but not event. pt has \"foggy brain\".        Pt BIB EMS from dialysis. Pt gets peritoneal dialysis. Pt had witnessed seizure during per EMS, pt did not take seizure medications today.       BP (!) 179/92   Pulse 76   Resp 16   Ht 1.651 m (5' 5\")   Wt 88 kg (194 lb)   SpO2 93%   BMI 32.28 kg/m²     "

## 2021-11-02 DIAGNOSIS — N18.6 ESRD (END STAGE RENAL DISEASE) ON DIALYSIS (HCC): ICD-10-CM

## 2021-11-02 DIAGNOSIS — Z99.2 ESRD (END STAGE RENAL DISEASE) ON DIALYSIS (HCC): ICD-10-CM

## 2021-11-02 DIAGNOSIS — G40.909 SEIZURE DISORDER (HCC): ICD-10-CM

## 2021-11-02 DIAGNOSIS — R56.9 SEIZURES (HCC): ICD-10-CM

## 2021-11-02 LAB
ALBUMIN SERPL BCP-MCNC: 3.6 G/DL (ref 3.2–4.9)
ALBUMIN/GLOB SERPL: 1.4 G/DL
ALP SERPL-CCNC: 145 U/L (ref 30–99)
ALT SERPL-CCNC: 10 U/L (ref 2–50)
ANION GAP SERPL CALC-SCNC: 20 MMOL/L (ref 7–16)
AST SERPL-CCNC: 8 U/L (ref 12–45)
BILIRUB SERPL-MCNC: 0.3 MG/DL (ref 0.1–1.5)
BUN SERPL-MCNC: 68 MG/DL (ref 8–22)
CALCIUM SERPL-MCNC: 8.7 MG/DL (ref 8.5–10.5)
CHLORIDE SERPL-SCNC: 98 MMOL/L (ref 96–112)
CO2 SERPL-SCNC: 23 MMOL/L (ref 20–33)
CREAT SERPL-MCNC: 16.71 MG/DL (ref 0.5–1.4)
EKG IMPRESSION: NORMAL
ERYTHROCYTE [DISTWIDTH] IN BLOOD BY AUTOMATED COUNT: 38.8 FL (ref 35.9–50)
GLOBULIN SER CALC-MCNC: 2.6 G/DL (ref 1.9–3.5)
GLUCOSE BLD-MCNC: 156 MG/DL (ref 65–99)
GLUCOSE BLD-MCNC: 78 MG/DL (ref 65–99)
GLUCOSE SERPL-MCNC: 143 MG/DL (ref 65–99)
HCT VFR BLD AUTO: 31 % (ref 37–47)
HGB BLD-MCNC: 10.5 G/DL (ref 12–16)
MCH RBC QN AUTO: 32 PG (ref 27–33)
MCHC RBC AUTO-ENTMCNC: 33.9 G/DL (ref 33.6–35)
MCV RBC AUTO: 94.5 FL (ref 81.4–97.8)
PLATELET # BLD AUTO: 99 K/UL (ref 164–446)
PMV BLD AUTO: 10.6 FL (ref 9–12.9)
POTASSIUM SERPL-SCNC: 5.3 MMOL/L (ref 3.6–5.5)
PROT SERPL-MCNC: 6.2 G/DL (ref 6–8.2)
RBC # BLD AUTO: 3.28 M/UL (ref 4.2–5.4)
SODIUM SERPL-SCNC: 141 MMOL/L (ref 135–145)
WBC # BLD AUTO: 4.7 K/UL (ref 4.8–10.8)

## 2021-11-02 PROCEDURE — 99232 SBSQ HOSP IP/OBS MODERATE 35: CPT | Performed by: NURSE PRACTITIONER

## 2021-11-02 PROCEDURE — 770020 HCHG ROOM/CARE - TELE (206)

## 2021-11-02 PROCEDURE — 700102 HCHG RX REV CODE 250 W/ 637 OVERRIDE(OP)

## 2021-11-02 PROCEDURE — 93005 ELECTROCARDIOGRAM TRACING: CPT | Performed by: HOSPITALIST

## 2021-11-02 PROCEDURE — 85027 COMPLETE CBC AUTOMATED: CPT

## 2021-11-02 PROCEDURE — 80053 COMPREHEN METABOLIC PANEL: CPT

## 2021-11-02 PROCEDURE — A9270 NON-COVERED ITEM OR SERVICE: HCPCS

## 2021-11-02 PROCEDURE — A9270 NON-COVERED ITEM OR SERVICE: HCPCS | Performed by: HOSPITALIST

## 2021-11-02 PROCEDURE — 82962 GLUCOSE BLOOD TEST: CPT

## 2021-11-02 PROCEDURE — 99233 SBSQ HOSP IP/OBS HIGH 50: CPT | Performed by: STUDENT IN AN ORGANIZED HEALTH CARE EDUCATION/TRAINING PROGRAM

## 2021-11-02 PROCEDURE — 700102 HCHG RX REV CODE 250 W/ 637 OVERRIDE(OP): Performed by: HOSPITALIST

## 2021-11-02 PROCEDURE — 90945 DIALYSIS ONE EVALUATION: CPT | Performed by: INTERNAL MEDICINE

## 2021-11-02 PROCEDURE — 3E1M39Z IRRIGATION OF PERITONEAL CAVITY USING DIALYSATE, PERCUTANEOUS APPROACH: ICD-10-PCS | Performed by: INTERNAL MEDICINE

## 2021-11-02 PROCEDURE — 700111 HCHG RX REV CODE 636 W/ 250 OVERRIDE (IP): Performed by: HOSPITALIST

## 2021-11-02 PROCEDURE — 36415 COLL VENOUS BLD VENIPUNCTURE: CPT

## 2021-11-02 PROCEDURE — 93010 ELECTROCARDIOGRAM REPORT: CPT | Performed by: INTERNAL MEDICINE

## 2021-11-02 PROCEDURE — 90945 DIALYSIS ONE EVALUATION: CPT

## 2021-11-02 RX ORDER — ESCITALOPRAM OXALATE 10 MG/1
TABLET ORAL
Status: COMPLETED
Start: 2021-11-02 | End: 2021-11-02

## 2021-11-02 RX ORDER — LISINOPRIL 20 MG/1
TABLET ORAL
Status: COMPLETED
Start: 2021-11-02 | End: 2021-11-02

## 2021-11-02 RX ORDER — HYDRALAZINE HYDROCHLORIDE 20 MG/ML
20 INJECTION INTRAMUSCULAR; INTRAVENOUS EVERY 6 HOURS PRN
Status: DISCONTINUED | OUTPATIENT
Start: 2021-11-02 | End: 2021-11-05 | Stop reason: HOSPADM

## 2021-11-02 RX ORDER — AMLODIPINE BESYLATE 10 MG/1
TABLET ORAL
Status: COMPLETED
Start: 2021-11-02 | End: 2021-11-02

## 2021-11-02 RX ADMIN — DOCUSATE SODIUM 50 MG AND SENNOSIDES 8.6 MG 2 TABLET: 8.6; 5 TABLET, FILM COATED ORAL at 16:03

## 2021-11-02 RX ADMIN — ESCITALOPRAM OXALATE 10 MG: 10 TABLET ORAL at 04:44

## 2021-11-02 RX ADMIN — LISINOPRIL 20 MG: 20 TABLET ORAL at 04:44

## 2021-11-02 RX ADMIN — LACOSAMIDE 100 MG: 100 TABLET, FILM COATED ORAL at 04:45

## 2021-11-02 RX ADMIN — PREGABALIN 50 MG: 25 CAPSULE ORAL at 07:30

## 2021-11-02 RX ADMIN — SEVELAMER CARBONATE 3200 MG: 800 TABLET, FILM COATED ORAL at 10:50

## 2021-11-02 RX ADMIN — AMLODIPINE BESYLATE 10 MG: 10 TABLET ORAL at 04:45

## 2021-11-02 RX ADMIN — SEVELAMER CARBONATE 3200 MG: 800 TABLET, FILM COATED ORAL at 06:34

## 2021-11-02 RX ADMIN — ONDANSETRON 4 MG: 2 INJECTION INTRAMUSCULAR; INTRAVENOUS at 10:50

## 2021-11-02 RX ADMIN — LACOSAMIDE 100 MG: 100 TABLET, FILM COATED ORAL at 16:04

## 2021-11-02 RX ADMIN — DOCUSATE SODIUM 50 MG AND SENNOSIDES 8.6 MG 2 TABLET: 8.6; 5 TABLET, FILM COATED ORAL at 04:44

## 2021-11-02 RX ADMIN — SEVELAMER CARBONATE 3200 MG: 800 TABLET, FILM COATED ORAL at 16:03

## 2021-11-02 RX ADMIN — PREGABALIN 50 MG: 25 CAPSULE ORAL at 21:33

## 2021-11-02 ASSESSMENT — ENCOUNTER SYMPTOMS
INSOMNIA: 0
MYALGIAS: 0
COUGH: 0
DIZZINESS: 0
FEVER: 0
BRUISES/BLEEDS EASILY: 0
SORE THROAT: 0
SHORTNESS OF BREATH: 0
BLURRED VISION: 0
HEADACHES: 0
WEAKNESS: 0
DOUBLE VISION: 0
VOMITING: 0
DEPRESSION: 0
PALPITATIONS: 0
NAUSEA: 0
NECK PAIN: 0

## 2021-11-02 ASSESSMENT — LIFESTYLE VARIABLES
HAVE YOU EVER FELT YOU SHOULD CUT DOWN ON YOUR DRINKING: NO
HAVE PEOPLE ANNOYED YOU BY CRITICIZING YOUR DRINKING: NO
TOTAL SCORE: 0
CONSUMPTION TOTAL: INCOMPLETE
TOTAL SCORE: 0
ALCOHOL_USE: YES
EVER FELT BAD OR GUILTY ABOUT YOUR DRINKING: NO
EVER HAD A DRINK FIRST THING IN THE MORNING TO STEADY YOUR NERVES TO GET RID OF A HANGOVER: NO
TOTAL SCORE: 0

## 2021-11-02 ASSESSMENT — FIBROSIS 4 INDEX
FIB4 SCORE: 1
FIB4 SCORE: 1.47

## 2021-11-02 NOTE — CONSULTS
DATE OF SERVICE:  11/01/2021     NEPHROLOGY CONSULTATION     REQUESTING PHYSICIAN:  Coy Ocampo MD     REASON FOR CONSULTATION:  Evaluate the patient with end-stage renal disease,   on peritoneal dialysis, continuing dialysis while inpatient.     HISTORY OF PRESENT ILLNESS: The patient is a 41-year-old female with end-stage   renal disease, currently on peritoneal dialysis, who presented to the   emergency room for evaluation of seizures.  She has a history of seizures   before, admitted not to be compliant to her seizure medications, very   somnolent; however, answering questions appropriately. Has no complaints at   the present time.  No abdominal pain.  PD fluid clear.     REVIEW OF SYSTEMS:  GENERAL:  No fever or chills. No sick contact.  Positive for fatigue.  HEENT:  No nosebleeds, no sinus pain, no sinus congestion. Eyes:  No double or   blurry vision.  NECK:  No pain, no stiffness.  RESPIRATORY:  No shortness of breath, no cough, no hemoptysis.  CARDIOVASCULAR:  No edema, no chest pain, no dyspnea.  GASTROINTESTINAL:  No abdominal pain. No nausea, vomiting, or diarrhea.  Other systems review all negative except seizures.     PAST MEDICAL HISTORY:  Seizure disorder, sepsis, avascular necrosis of both   hips, end-stage renal disease, on peritoneal dialysis; arthritis,   fibromyalgia, lupus.     FAMILY HISTORY:  No history of kidney disease or lupus.     PAST SURGICAL HISTORY:  1.  Dialysis catheter placement.  2.  Thrombectomy of AV fistula.  3.  Hip replacement.  4.  Fistula creation.  5.  Tracheostomy, gastroscopy, colonoscopy.     ALLERGIES:  ALLERGIC TO LORAZEPAM, MORPHINE AND SEASONAL ALLERGIES.     MEDICATIONS:  Outpatient and inpatient medications reviewed.     PHYSICAL EXAMINATION:  VITAL SIGNS:  Blood pressure 179/92, pulse 76, temperature 36.8 Celsius.  GENERAL APPEARANCE:  Well-developed, well-nourished female, somnolent, in no   acute distress.  HEENT:  Normocephalic, atraumatic.  Pupils  equal, round, reactive to light.    Extraocular movement intact.  Nares patent.  Oropharynx clear, moist mucosa;   no erythema or exudate.  NECK:  Supple.  No lymphadenopathy, no thyromegaly appreciated.  LUNGS:  Clear to auscultation bilaterally.  No rales or wheezes, no rhonchi.  HEART:  Regular rate and rhythm. No rub or gallop.  ABDOMEN:  Soft, nontender, nondistended.  Peritoneal dialysis catheter in   place, clean. Site, no infection.  SKIN:  Warm, dry. No erythema or rash.  EXTREMITIES:  No cyanosis or clubbing, no edema.  NEUROLOGIC:  The patient is alert, following commands, confused.     LABORATORY DATA:  Hemoglobin level 11.3.  Sodium 140, potassium 6.1, BUN 70,   creatinine 16.5.     ASSESSMENT AND PLAN:  The patient is a 41-year-old female with multiple   medical problems with end-stage renal disease, on peritoneal dialysis,   admitted post-seizure, confused.  1.  End-stage renal disease.  We will continue peritoneal dialysis daily.  2.  Electrolytes: Hyperkalemia. To provide low potassium diet.  Continue to   monitor closely.  3.  Hypertension.  Blood pressure remains well controlled.  4.  Volume well controlled.  5.  Anemia.  Continue to monitor hemoglobin level. Hemoglobin level at goal.     RECOMMENDATIONS:  1.  Peritoneal dialysis.  2.  Monitor basic metabolic panel and hemoglobin level.  3.  Provide renal diet.  4.  To adjust all medications to renal doses.     Above plan was discussed with Dr. Coy Ocampo.     Thank you for the consult.        ______________________________  MD GHAZAL COX/ЕКАТЕРИНА    DD:  11/01/2021 18:44  DT:  11/01/2021 19:07    Job#:  [id number]

## 2021-11-02 NOTE — DISCHARGE PLANNING
Outpatient Dialysis Note    Confirmed patient is active at:    [facility] ([facility]  [address] [facility] [address]  [place] [address]    Schedule: PD    Patient is seen by Dr. Najjar in HD clinic.    Spoke with Nadine  at facility who confirmed.    Forwarded records for review.    Elicia Ortiz- Dialysis Coordinator # [phone]  [facility]

## 2021-11-02 NOTE — PROGRESS NOTES
Pt aseptically disconnected to [facility] at 1200 with clear effluent, no fibrin output. Dressing was soiled, changed, no s/s of infection noted. Initial drain of 225 ml and total UF of 716 ml with last fill of 2 Liters. Vital signs stable. Gave report to Charlie, primary RN see flow sheet for details.

## 2021-11-02 NOTE — H&P
"Hospital Medicine History & Physical Note    Date of Service  11/1/2021    Primary Care Physician  Sushila Manzano M.D.    Consultants  neurology    Specialist Names: Dr. Saleh    Code Status  Prior    Chief Complaint  Chief Complaint   Patient presents with   • Seizure     pt was at dialysis when had seizure. pt post ictal- oriented to person, place and time but not event. pt has \"foggy brain\".        History of Presenting Illness  Debby Carrizales is a 41 y.o. female, h/o ESRD on PD, and seizures on Vimpat, lupus, and brought to the ER on 11/1/2021 after having a possible seizure while having her regular scheduled peritoneal dialysis.  She became significantly confused and told staff that she has not been taking her seizure medications.  At the time I am interviewing patient she says she does not remember anything and is unable to give me much history.  She has been in the ER for over 6 hours and still confused, unable to say what year is this and answer even simple questions because she says she is \"very confused \".    Review of Systems  Review of Systems   Constitutional: Negative for fever.   HENT: Negative for congestion and sore throat.    Eyes: Negative for blurred vision and double vision.   Respiratory: Negative for cough and shortness of breath.    Cardiovascular: Negative for chest pain and palpitations.   Gastrointestinal: Negative for nausea and vomiting.   Genitourinary: Negative for dysuria and urgency.   Musculoskeletal: Negative for myalgias and neck pain.   Skin: Negative for itching and rash.   Neurological: Negative for dizziness, weakness and headaches.        Confusion   Endo/Heme/Allergies: Does not bruise/bleed easily.   Psychiatric/Behavioral: Negative for depression. The patient does not have insomnia.        Past Medical History   has a past medical history of Anemia, Arthritis, Avascular necrosis of bones of both hips (HCC) (10/10/2016), Blood clotting disorder ([facility]), " Clostridium difficile colitis (5/3/2011), Dialysis patient, ESBL (extended spectrum beta-lactamase) producing bacteria infection (8/25/2014), Fall, Fibromyalgia, High anion gap metabolic acidosis (4/2/2011), Hypertension, Lupus (HCC), Pneumonia (), Psychiatric disorder, Pyelonephritis (11/21/2017), Renal failure, Sepsis due to pneumonia (HCC) (6/7/2018), and Status epilepticus ([facility]) (12/13/2018).    Surgical History   has a past surgical history that includes gastroscopy-endo (4/10/2011); sclerotheraphy (4/10/2011); gastroscopy-endo (4/18/2011); colonoscopy with biopsy (4/20/2011); gastroscopy-endo (4/27/2011); other (5/2011); other abdominal surgery; av fistula creation (9/9/2014); cath placement (9/9/2014); av fistula creation (11/14/2014); av fistula creation (2/3/2015); hip arthroplasty total (Right, 1/18/2016); closed reduction (Right, 7/5/2016); av fistula creation (Right, 7/12/2016); thrombectomy (Right, 8/20/2016); thrombectomy (Right, 8/21/2016); angioplasty balloon (8/21/2016); tracheostomy; av fistula creation (Right, 12/9/2017); thrombectomy (12/9/2017); av fistula revision (Right, 8/11/2018); av fistula thrombolysis (Right, 8/11/2018); tahir (N/A, 8/20/2019); thrombectomy (Right, 12/7/2020); cath placement (Left, 12/7/2020); and cath placement capd (N/A, 5/5/2021).     Family History  family history includes Cancer in her father; Heart Disease in her mother.   Family history reviewed with patient. There is no family history that is pertinent to the chief complaint.     Social History   reports that she has been smoking. She started smoking about 26 years ago. She has a 9.00 pack-year smoking history. She has never used smokeless tobacco. She reports that she does not drink alcohol and does not use drugs.    Allergies  Allergies   Allergen Reactions   • Lorazepam [Ativan] Anxiety and Unspecified     Patient becomes severely paranoid and agitated, hallucinates   • Morphine Itching     Tolerates  Dilaudid   • Seasonal Runny Nose and Itching     Hay fever, sabiha brush       Medications  Prior to Admission Medications   Prescriptions Last Dose Informant Patient Reported? Taking?   Naloxone (NARCAN) 4 MG/0.1ML Liquid PRN at PRN Patient No No   Sig: Administer 4 mg into affected nostril(S) one time as needed (for severe sleepiness or difficulty breathing due to opioid overdose) for up to 1 dose.   Oxycodone HCl 20 MG Tab UNKNOWN at UNKNOWN TIME  No No   Sig: Take 1 Tablet by mouth 4 times a day as needed (pain) for up to 28 days.   Oxycodone HCl 20 MG Tab FUTURE FILL at FUTURE FILL  No No   Sig: Take 1 Tablet by mouth 4 times a day as needed (pain) for up to 28 days.   RENVELA 800 MG Tab tablet UNKNOWN at UNKNOWN TIME  Yes No   Sig: Take 1,600-3,200 mg by mouth see administration instructions. Take 4 tablets (3,200 mg) 3 times per day with meals and 2 tablets (1,600 mg) with snacks.   amLODIPine (NORVASC) 10 MG Tab UNKNOWN at UNKNOWN TIME  No No   Sig: Take 1 tablet by mouth every day.   escitalopram (LEXAPRO) 10 MG Tab UNKNOWN at UNKNOWN TIME  Yes Yes   Sig: Take 10 mg by mouth every day.   lacosamide (VIMPAT) 100 MG Tab tablet UNKNOWN at UNKNOWN TIME Patient Yes No   Sig: Take 100 mg by mouth 2 Times a Day.   lactulose 10 GM/15ML Solution PRN at PRN  Yes No   Sig: Take 20 g by mouth 1 time a day as needed (Constipation). 30 mL = 20 mg.   lisinopril (PRINIVIL) 20 MG Tab UNKNOWN at UNKNOWN TIME  Yes Yes   Sig: Take 20 mg by mouth every day.   pregabalin (LYRICA) 50 MG capsule UNKNOWN at UNKNOWN TIME  No No   Sig: Take 1 Capsule by mouth 2 times a day for 28 days.   promethazine (PHENERGAN) 25 MG Tab UNKNOWN at UNKNOWN TIME Patient No No   Sig: Take 1 Tab by mouth every 8 hours as needed for Nausea/Vomiting.   tizanidine (ZANAFLEX) 4 MG Tab UNKNOWN at UNKNOWN TIME  No No   Sig: TAKE 2 TABLETS BY MOUTH AT BEDTIME AS NEEDED FOR MUSCLE SPASMS   traZODone (DESYREL) 50 MG Tab UNKNOWN at UNKNOWN TIME  No No   Sig: TAKE  1 TABLET BY MOUTH EVERY NIGHT AT BEDTIME      Facility-Administered Medications: None       Physical Exam  Temp:  [36.8 °C (98.3 °F)] 36.8 °C (98.3 °F)  Pulse:  [64-76] 64  Resp:  [14-16] 14  BP: (148-179)/(81-92) 148/87  SpO2:  [90 %-95 %] 93 %  Blood Pressure: 148/87   Temperature: 36.8 °C (98.3 °F)   Pulse: 64   Respiration: 14   Pulse Oximetry: 93 %       Physical Exam  Constitutional:       Appearance: Normal appearance.   HENT:      Head: Normocephalic and atraumatic.      Mouth/Throat:      Mouth: Mucous membranes are moist.      Pharynx: Oropharynx is clear.   Eyes:      Extraocular Movements: Extraocular movements intact.      Pupils: Pupils are equal, round, and reactive to light.   Cardiovascular:      Rate and Rhythm: Normal rate and regular rhythm.      Heart sounds: Normal heart sounds.   Pulmonary:      Effort: Pulmonary effort is normal.      Breath sounds: Normal breath sounds.   Abdominal:      General: Abdomen is flat. Bowel sounds are normal.      Palpations: Abdomen is soft.   Musculoskeletal:      Cervical back: Normal range of motion and neck supple.   Skin:     General: Skin is warm and dry.   Neurological:      General: No focal deficit present.      Mental Status: She is alert and oriented to person, place, and time.      Comments: Speech is normal.  Cranial nerves grossly intact.  Able to follow simple commands.  Positive asterixis.  Strength is 5/5 for both upper and lower extremity.  Sensation within normal limits, bilateral upper and lower extremities and face.   Psychiatric:         Mood and Affect: Mood normal.         Behavior: Behavior normal.         Laboratory:  Recent Labs     11/01/21  1307   WBC 4.4*   RBC 3.64*   HEMOGLOBIN 11.3*   HEMATOCRIT 34.3*   MCV 94.2   MCH 31.0   MCHC 32.9*   RDW 39.2   PLATELETCT 107*   MPV 10.5     Recent Labs     11/01/21  1307   SODIUM 140   POTASSIUM 6.1*   CHLORIDE 98   CO2 23   GLUCOSE 79   BUN 70*   CREATININE 16.57*   CALCIUM 8.3*     Recent  Labs     11/01/21  1307   ALTSGPT 5   ASTSGOT 9*   ALKPHOSPHAT 166*   TBILIRUBIN 0.3   GLUCOSE 79         No results for input(s): NTPROBNP in the last 72 hours.      No results for input(s): TROPONINT in the last 72 hours.    Imaging:  CT-HEAD W/O   Final Result         NO ACUTE ABNORMALITIES ARE NOTED ON CT SCAN OF THE HEAD.               Assessment/Plan:  I anticipate this patient will require at least two midnights for appropriate medical management, necessitating inpatient admission.    * Hyperkalemia  Assessment & Plan  -Inpatient status on telemetry unit  -Initial potassium was 6.1.  I requested repeat potassium that came back more elevated at 6.8.  -ERP had discussed the case with nephrology on call and Dr. Shaver evaluated patient while she was in the emergency department.  When I received new potassium results, I paged Dr. Shaver for updates  -Patient will receive hyperkalemia protocol including glucose/insulin, calcium gluconate, sodium bicarb and veltassa.  -I also requested patient to be prioritized to go to a room upstairs as she will need to start PD as soon as possible.  She will be on peritoneal dialysis for at least 24 hours.  -Appreciate nephrology consult and recommendation  -EKG added    Acute metabolic encephalopathy  Assessment & Plan  -Observation status on telemetry unit.  -Unclear etiology of metabolic encephalopathy.  -She was likely postictal as she is not taking her Vimpat but given prolonged time of confusion, likely underlying metabolic encephalopathy of other causes.  -Appreciate neurologic consult and recommendations.  Patient was evaluated by Dr. Saleh in the emergency department.  -She was loaded with Vimpat and they are recommending EEG if patient is not improving.  No additional imaging at this time was recommended.  -Ammonia level was normal and BUN was 70 which could also be contributing to this.    Seizures (HCC)  Assessment & Plan  -Has underlying history of seizures and taking  Vimpat.  Reported to be off medication for a few days.  -She has been evaluated by neurology in the emergency department.  -I appreciate consult and recommendations.  -See above.      VTE prophylaxis: SCDs/TEDs

## 2021-11-02 NOTE — PROGRESS NOTES
"Referral to epilepsy clinic. Seizure disorder history on Vimpat. Presented from dialysis on 11/1/21 for witnessed seizure during peritoneal dialysis. Medication non-compliance with missing AED for \"a few days.\" Has not followed with outpatient Neurologist (previously seen by Dr. Leonardo) in \"more than a year.\"  "

## 2021-11-02 NOTE — PROGRESS NOTES
Pt received from ER. Tele monitor in place. VSS. Pt oriented to room. Educated on use of the call light. Pt demonstrated use of the call light. Discussed POC. All questions answered.

## 2021-11-02 NOTE — PROGRESS NOTES
Dr. Albert notified that patient's CBS 78 and Novlin  R 9 units IVP ordered. MD will change dose to 4.5 units IVP. Pt's CBS rechecked 20 min after administration of D50 IVP and Novlin R 4.5 units- .

## 2021-11-02 NOTE — PROCEDURES
Nephrology/Peritonea; Dialysis note    Patient with ESRD/PD admitted with seizures, AMS  Seen and examined while connected to [facility] -tolerates well  VS stable  Still confused  Lab results reviewed  Hyperkalemia improved  Please see dialysis flow sheet for details

## 2021-11-02 NOTE — ASSESSMENT & PLAN NOTE
-Has underlying history of seizures and taking Vimpat.  Reported to be off medication for a few days.  -She has been evaluated by neurology in the emergency department.  -I appreciate consult and recommendations.  -See above.

## 2021-11-02 NOTE — ASSESSMENT & PLAN NOTE
-Inpatient status on telemetry unit  -Initial potassium was 6.1.  I requested repeat potassium that came back more elevated at 6.8.  -ERP had discussed the case with nephrology on call and Dr. Shaver evaluated patient while she was in the emergency department.  When I received new potassium results, I paged Dr. Shaver for updates  -Patient will receive hyperkalemia protocol including glucose/insulin, calcium gluconate, sodium bicarb and veltassa.  -I also requested patient to be prioritized to go to a room upstairs as she will need to start PD as soon as possible.  She will be on peritoneal dialysis for at least 24 hours.  -Appreciate nephrology consult and recommendation  -EKG added

## 2021-11-02 NOTE — ASSESSMENT & PLAN NOTE
-Observation status on telemetry unit.  -Unclear etiology of metabolic encephalopathy.  -She was likely postictal as she is not taking her Vimpat but given prolonged time of confusion, likely underlying metabolic encephalopathy of other causes.  -Appreciate neurologic consult and recommendations.  Patient was evaluated by Dr. Saleh in the emergency department.  -She was loaded with Vimpat and they are recommending EEG if patient is not improving.  No additional imaging at this time was recommended.  -Ammonia level was normal and BUN was 70 which could also be contributing to this.

## 2021-11-02 NOTE — FLOWSHEET NOTE
Assumed care of pt, received bedside report from Talia FINCH. Pt sitting up in bed, no complaints of pain, room air, A/Ox1. Fall and safety precautions in place, strip alarm in place. Discussed POC with pt, pt verbalizes understanding. No further needs at this time.

## 2021-11-02 NOTE — CONSULTS
Hospital Neurology Consult:    Referring Physician: Dr. Kory Ocampo    Reason for consultation: Seizure    HPI: Debby Carrizales is a 41 y.o. female with history of ESRD on peritoneal dialysis, lupus, hx of neuropathy and seizure disorder on Vimpat presenting to the hospital for seizure like activity and consulted for seizure. Patient was at peritoneal dialysis when she had a seizure. Semiology not provided. Patient was brought to [facility] for further evaluation. She was found to have some electrolyte abnormalities. She was also found to be post-ictal. Neurology consulted for seizure. She reportedly had missed her Vimpat dose for a few days.     ROS:     As above. All other systems reviewed and are negative.    Past Medical History:    has a past medical history of Anemia, Arthritis, Avascular necrosis of bones of both hips (HCC) (10/10/2016), Blood clotting disorder ([facility]), Clostridium difficile colitis (5/3/2011), Dialysis patient, ESBL (extended spectrum beta-lactamase) producing bacteria infection (8/25/2014), Fall, Fibromyalgia, High anion gap metabolic acidosis (4/2/2011), Hypertension, Lupus ([facility]), Pneumonia (), Psychiatric disorder, Pyelonephritis (11/21/2017), Renal failure, Sepsis due to pneumonia ([facility]) (6/7/2018), and Status epilepticus ([facility]) (12/13/2018).    FHx:  family history includes Cancer in her father; Heart Disease in her mother.    SHx:   reports that she has been smoking. She started smoking about 26 years ago. She has a 9.00 pack-year smoking history. She has never used smokeless tobacco. She reports that she does not drink alcohol and does not use drugs.    Allergies:  Allergies   Allergen Reactions   • Lorazepam [Ativan] Anxiety and Unspecified     Patient becomes severely paranoid and agitated, hallucinates   • Morphine Itching     Tolerates Dilaudid   • Seasonal Runny Nose and Itching     Hay fever, sabiha brush       Medications:  No current facility-administered medications  for this encounter.    Current Outpatient Medications:   •  lisinopril (PRINIVIL) 20 MG Tab, Take 20 mg by mouth every day., Disp: , Rfl:   •  escitalopram (LEXAPRO) 10 MG Tab, Take 10 mg by mouth every day., Disp: , Rfl:   •  lactulose 10 GM/15ML Solution, Take 20 g by mouth 1 time a day as needed (Constipation). 30 mL = 20 mg., Disp: , Rfl:   •  RENVELA 800 MG Tab tablet, Take 1,600-3,200 mg by mouth see administration instructions. Take 4 tablets (3,200 mg) 3 times per day with meals and 2 tablets (1,600 mg) with snacks., Disp: , Rfl:   •  pregabalin (LYRICA) 50 MG capsule, Take 1 Capsule by mouth 2 times a day for 28 days., Disp: 56 Capsule, Rfl: 1  •  Oxycodone HCl 20 MG Tab, Take 1 Tablet by mouth 4 times a day as needed (pain) for up to 28 days., Disp: 100 Tablet, Rfl: 0  •  [START ON 11/16/2021] Oxycodone HCl 20 MG Tab, Take 1 Tablet by mouth 4 times a day as needed (pain) for up to 28 days., Disp: 100 Tablet, Rfl: 0  •  traZODone (DESYREL) 50 MG Tab, TAKE 1 TABLET BY MOUTH EVERY NIGHT AT BEDTIME, Disp: 90 tablet, Rfl: 1  •  tizanidine (ZANAFLEX) 4 MG Tab, TAKE 2 TABLETS BY MOUTH AT BEDTIME AS NEEDED FOR MUSCLE SPASMS, Disp: 180 tablet, Rfl: 3  •  amLODIPine (NORVASC) 10 MG Tab, Take 1 tablet by mouth every day., Disp: 90 tablet, Rfl: 3  •  promethazine (PHENERGAN) 25 MG Tab, Take 1 Tab by mouth every 8 hours as needed for Nausea/Vomiting., Disp: 60 Tab, Rfl: 11  •  Naloxone (NARCAN) 4 MG/0.1ML Liquid, Administer 4 mg into affected nostril(S) one time as needed (for severe sleepiness or difficulty breathing due to opioid overdose) for up to 1 dose., Disp: 2 Each, Rfl: 0  •  lacosamide (VIMPAT) 100 MG Tab tablet, Take 100 mg by mouth 2 Times a Day., Disp: , Rfl:     Vitals:   Vitals:    11/01/21 1253 11/01/21 1400 11/01/21 1710 11/01/21 1923   BP: (!) 179/92 (!) 177/91 153/81 148/87   Pulse: 76 76 65 64   Resp: 16  16 14   Temp:   36.8 °C (98.3 °F)    TempSrc:   Tympanic    SpO2: 93% 90% 95% 93%   Weight:        Height:           Labs:  Lab Results   Component Value Date/Time    PROTHROMBTM 14.2 12/04/2020 04:55 PM    INR 1.07 12/04/2020 04:55 PM      Lab Results   Component Value Date/Time    WBC 4.4 (L) 11/01/2021 01:07 PM    RBC 3.64 (L) 11/01/2021 01:07 PM    HEMOGLOBIN 11.3 (L) 11/01/2021 01:07 PM    HEMATOCRIT 34.3 (L) 11/01/2021 01:07 PM    MCV 94.2 11/01/2021 01:07 PM    MCH 31.0 11/01/2021 01:07 PM    MCHC 32.9 (L) 11/01/2021 01:07 PM    MPV 10.5 11/01/2021 01:07 PM    NEUTSPOLYS 63.10 11/01/2021 01:07 PM    LYMPHOCYTES 24.30 11/01/2021 01:07 PM    MONOCYTES 9.60 11/01/2021 01:07 PM    EOSINOPHILS 0.90 11/01/2021 01:07 PM    BASOPHILS 1.60 11/01/2021 01:07 PM    HYPOCHROMIA 1+ 07/26/2017 07:10 AM    ANISOCYTOSIS 1+ 01/04/2019 04:00 AM      Lab Results   Component Value Date/Time    SODIUM 140 11/01/2021 01:07 PM    POTASSIUM 6.1 (H) 11/01/2021 01:07 PM    CHLORIDE 98 11/01/2021 01:07 PM    CO2 23 11/01/2021 01:07 PM    GLUCOSE 79 11/01/2021 01:07 PM    BUN 70 (H) 11/01/2021 01:07 PM    CREATININE 16.57 (HH) 11/01/2021 01:07 PM    CREATININE 0.9 12/13/2008 12:05 AM      Lab Results   Component Value Date/Time    CHOLSTRLTOT 113 06/08/2018 04:34 AM    LDL 64 06/08/2018 04:34 AM    HDL 21 (A) 06/08/2018 04:34 AM    TRIGLYCERIDE 141 06/08/2018 04:34 AM       Lab Results   Component Value Date/Time    ALKPHOSPHAT 166 (H) 11/01/2021 01:07 PM    ASTSGOT 9 (L) 11/01/2021 01:07 PM    ALTSGPT 5 11/01/2021 01:07 PM    TBILIRUBIN 0.3 11/01/2021 01:07 PM        Lab Results   Component Value Date/Time    FREET4 1.36 09/14/2016 02:10 PM     Imaging/Testing:  CT Head W/O CST personally reviewed w/o evidence of acute ischemia/hemorrhage.     Previous EEG reports reviewed in chart.     Physical Exam:     General: 42 y/o female in bed in NAD  Cardio: Normal S1/S2. No peripheral edema.   Pulm: CTAX2. No respiratory distress.   Skin: Warm, dry, no rashes or lesions   Psychiatric: Unable to assess  HEENT: Atraumatic head, normal  sclera and conjunctiva, moist oral mucosa. No lid lag.  Abdomen: Soft, non tender. No masses or hepatosplenomegaly.    Neurologic:  Mental Status:  Awake, alert. Oriented to situation. Able to follow some commands. Speech appears to be fluent. No aphasia.   Cranial Nerves:  PERRL. EOMi. Face symmetric  Motor:  Normal muscle tone and bulk. Moves all extremities symmetrically. Prominent asterixis seen in the bilateral upper/lower extremities.   Reflexes:  Deferred  Coordination: Deferred  Sensation: Deferred  Gait/Station: Deferred    Assessment/Plan:    Debby Carrizales is a 41 y.o. female with history of ESRD on peritoneal dialysis, lupus, hx of neuropathy and seizure disorder on Vimpat presenting to the hospital for seizure like activity and consulted for seizure. Currently patient is alert, however has prominent asterixis and appears to have encephalopathy. While there may be some element of post-ictal state to her current presentation it's more likely to be due to underlying metabolic disturbances. Etiology of seizure is likely due to missing her AEDs for a few days. Would recommend treatment of underlying metabolic disturbances; if she returns to baseline then no need for EEG. If this is not the case then a spot EEG can be obtained.     Plan:  -Continue Vimpat 100mg BID (home dose)  -EEG in the am if symptoms do not improve. No need for further neuroimaging at this time,  -Will follow w/ above.   -Metabolic workup per medicine/E.D.   -Plan discussed with consulting physician and patient's nurse.       Elvis Saleh M.D., Diplomat of the American Board of Psychiatry and Neurology  Diplomat of [facility]   Assistant Clinical Professor, [facility] Neurology Consultant

## 2021-11-02 NOTE — PROGRESS NOTES
[facility] Services Progress Note     [facility]PD ordered by Dr. Shaver x 10 hours using 2.5% PD solution.      Received patient at bedside awake; oriented x 1; answers questions with single word yes; BP elevated at 170's; on room air  Aseptically connected PD cycler to PD catheter at 0130  Exit site cleansed, dressing changed CDI; no s/s infection noted.      Report given to Primary RN CHASIDY Pfeiffer including troubleshooting, emergency disconnection procedure,   and on-call Dialysis RN contact information ([phone]).      Please call for any issues with hemodynamic instability/persistent alarms/patient not tolerating therapy.

## 2021-11-02 NOTE — PROGRESS NOTES
[facility] Medicine Daily Progress Note    Date of Service  11/2/2021    Chief Complaint  Debby Carrizales is a 41 y.o. female admitted 11/1/2021 with hyperkalemia and seizure episodes    Interval Problem Update  Patient seen and examined at bedside this morning.  No acute events overnight.     Patient remains very confused on examination this morning.  Nephrology following for dialysis needs, started on PD.  We will closely monitor hyperkalemia on telemetry for now.  Neurology following for concerns for recent seizure episodes likely due to medication noncompliance. If no improvement in mentation the next 24 hours, will plan for stat EEG.    I have personally seen and examined the patient at bedside. I discussed the plan of care with patient.    Consultants/Specialty  nephrology and neurology    Code Status  Full Code    Disposition  Patient is not medically cleared.   Anticipate discharge to to home with close outpatient follow-up.  I have placed the appropriate orders for post-discharge needs.    Review of Systems  Review of Systems   Unable to perform ROS: Mental status change        Physical Exam  Temp:  [36.8 °C (98.3 °F)-37.1 °C (98.8 °F)] 36.9 °C (98.4 °F)  Pulse:  [64-88] 77  Resp:  [14-18] 16  BP: (148-176)/() 155/94  SpO2:  [91 %-96 %] 91 %    Physical Exam    Fluids    Intake/Output Summary (Last 24 hours) at 11/2/2021 1519  Last data filed at 11/2/2021 0800  Gross per 24 hour   Intake 50 ml   Output --   Net 50 ml       Laboratory  Recent Labs     11/01/21  1307 11/02/21 0316   WBC 4.4* 4.7*   RBC 3.64* 3.28*   HEMOGLOBIN 11.3* 10.5*   HEMATOCRIT 34.3* 31.0*   MCV 94.2 94.5   MCH 31.0 32.0   MCHC 32.9* 33.9   RDW 39.2 38.8   PLATELETCT 107* 99*   MPV 10.5 10.6     Recent Labs     11/01/21  1307 11/01/21 2012 11/02/21 0316   SODIUM 140 141 141   POTASSIUM 6.1* 6.8* 5.3   CHLORIDE 98 99 98   CO2 23 23 23   GLUCOSE 79 79 143*   BUN 70* 72* 68*   CREATININE 16.57* 18.65* 16.71*   CALCIUM 8.3*  8.2* 8.7                   Imaging  CT-HEAD W/O   Final Result         NO ACUTE ABNORMALITIES ARE NOTED ON CT SCAN OF THE HEAD.              Assessment/Plan  * Hyperkalemia  Assessment & Plan  -Inpatient status on telemetry unit  -Initial potassium was 6.1.  I requested repeat potassium that came back more elevated at 6.8.  -ERP had discussed the case with nephrology on call and Dr. Shaver evaluated patient while she was in the emergency department.  When I received new potassium results, I paged Dr. Shaver for updates  -Patient will receive hyperkalemia protocol including glucose/insulin, calcium gluconate, sodium bicarb and veltassa.  -I also requested patient to be prioritized to go to a room upstairs as she will need to start PD as soon as possible.  She will be on peritoneal dialysis for at least 24 hours.  -Appreciate nephrology consult and recommendation  -EKG added    Acute metabolic encephalopathy  Assessment & Plan  -Observation status on telemetry unit.  -Unclear etiology of metabolic encephalopathy.  -She was likely postictal as she is not taking her Vimpat but given prolonged time of confusion, likely underlying metabolic encephalopathy of other causes.  -Appreciate neurologic consult and recommendations.  Patient was evaluated by Dr. Saleh in the emergency department.  -She was loaded with Vimpat and they are recommending EEG if patient is not improving.  No additional imaging at this time was recommended.  -Ammonia level was normal and BUN was 70 which could also be contributing to this.    Seizures (HCC)  Assessment & Plan  -Has underlying history of seizures and taking Vimpat.  Reported to be off medication for a few days.  -She has been evaluated by neurology in the emergency department.  -I appreciate consult and recommendations.  -See above.    Hypertension- (present on admission)  Assessment & Plan  Continue home medication.    ESRD (end stage renal disease) on dialysis (HCC)- (present on  admission)  Assessment & Plan  On peritoneal dialysis.  Nephrology consulted.       VTE prophylaxis: heparin ppx    I have performed a physical exam and reviewed and updated ROS and Plan today (11/2/2021). In review of yesterday's note (11/1/2021), there are no changes except as documented above.    Patient is has a high medical complexity and is at high risk for complication, morbidity, and mortality.    Case discussed with patient, nurse staff and during multidisciplinary rounds.

## 2021-11-02 NOTE — PROGRESS NOTES
"Neurology Progress Note  Neurohospitalist Service, [facility]    Referring Physician: Theodore Weinberg D.O.    Chief Complaint   Patient presents with   • Seizure     pt was at dialysis when had seizure. pt post ictal- oriented to person, place and time but not event. pt has \"foggy brain\".        HPI: Refer to initial documented Neurology H&P, as detailed in the patient's chart.    Interval History 11/2/2021: No acute events including any further witnessed seizure activity. Patient is currently lying in bed, awake, alert, following commands, and oriented to self, place, time, but disoriented to situation. Denies headache, vision changes, facial or focal weakness, numbness, tingling, abnormal movements, further seizures, or falls.     Past Medical History:   Past Medical History:   Diagnosis Date   • Anemia    • Arthritis     all joints,r/t lupus   • Avascular necrosis of bones of both hips (HCC) 10/10/2016   • Blood clotting disorder ([facility])     in AV Graft   • Clostridium difficile colitis 5/3/2011   • Dialysis patient    • ESBL (extended spectrum beta-lactamase) producing bacteria infection 8/25/2014   • Fall    • Fibromyalgia    • High anion gap metabolic acidosis 4/2/2011   • Hypertension    • Lupus (HCC)    • Pneumonia    • Psychiatric disorder     anxiety, depression   • Pyelonephritis 11/21/2017   • Renal failure    • Sepsis due to pneumonia ([facility]) 6/7/2018   • Status epilepticus ([facility]) 12/13/2018    last seizure 2 mos ago 03/2021        FHx:  Family History   Problem Relation Age of Onset   • Heart Disease Mother    • Cancer Father         SHx:  Social History     Socioeconomic History   • Marital status: Single     Spouse name: Not on file   • Number of children: Not on file   • Years of education: Not on file   • Highest education level: Not on file   Occupational History   • Not on file   Tobacco Use   • Smoking status: Current Every Day Smoker     Packs/day: 0.50     Years: " 18.00     Pack years: 9.00     Start date: 1995     Last attempt to quit: 6/13/2011     Years since quitting: 10.3   • Smokeless tobacco: Never Used   • Tobacco comment: vaping now   Vaping Use   • Vaping Use: Some days   • Substances: Flavoring   • Devices: Disposable   Substance and Sexual Activity   • Alcohol use: No     Alcohol/week: 0.0 oz   • Drug use: No     Types: Marijuana   • Sexual activity: Not Currently     Partners: Male   Other Topics Concern   •  Service No   • Blood Transfusions Yes   • Caffeine Concern No   • Occupational Exposure No   • Hobby Hazards No   • Sleep Concern Yes   • Stress Concern Yes   • Weight Concern Yes   • Special Diet Yes   • Back Care No   • Exercise Yes   • Bike Helmet No   • Seat Belt Yes   • Self-Exams Yes   Social History Narrative   • Not on file     Social Determinants of Health     Financial Resource Strain: Medium Risk   • Difficulty of Paying Living Expenses: Somewhat hard   Food Insecurity: Food Insecurity Present   • Worried About Running Out of Food in the Last Year: Often true   • Ran Out of Food in the Last Year: Often true   Transportation Needs: Unmet Transportation Needs   • Lack of Transportation (Medical): Yes   • Lack of Transportation (Non-Medical): Yes   Physical Activity:    • Days of Exercise per Week:    • Minutes of Exercise per Session:    Stress:    • Feeling of Stress :    Social Connections:    • Frequency of Communication with Friends and Family:    • Frequency of Social Gatherings with Friends and Family:    • Attends Confucianism Services:    • Active Member of Clubs or Organizations:    • Attends Club or Organization Meetings:    • Marital Status:    Intimate Partner Violence:    • Fear of Current or Ex-Partner:    • Emotionally Abused:    • Physically Abused:    • Sexually Abused:         Medications:    Current Facility-Administered Medications:   •  hydrALAZINE (APRESOLINE) injection 20 mg, 20 mg, Intravenous, Q6HRS Barb REYNA  RAMON Mota  •  amLODIPine (NORVASC) tablet 10 mg, 10 mg, Oral, DAILY, Indira Moncada M.D., 10 mg at 11/02/21 0445  •  escitalopram (Lexapro) tablet 10 mg, 10 mg, Oral, DAILY, Indira Moncada M.D., 10 mg at 11/02/21 0444  •  lacosamide (VIMPAT) tablet 100 mg, 100 mg, Oral, BID, Indira Moncada M.D., 100 mg at 11/02/21 0445  •  lisinopril (PRINIVIL) tablet 20 mg, 20 mg, Oral, DAILY, Indira Moncada M.D., 20 mg at 11/02/21 0444  •  pregabalin (LYRICA) capsule 50 mg, 50 mg, Oral, BID, Indira Moncada M.D., 50 mg at 11/02/21 0730  •  acetaminophen (Tylenol) tablet 650 mg, 650 mg, Oral, Q6HRS PRN, Indira Moncada M.D.  •  ondansetron (ZOFRAN) syringe/vial injection 4 mg, 4 mg, Intravenous, Q4HRS PRN, Indira Moncada M.D., 4 mg at 11/02/21 1050  •  ondansetron (ZOFRAN ODT) dispertab 4 mg, 4 mg, Oral, Q4HRS PRN, Indira Moncada M.D.  •  promethazine (PHENERGAN) tablet 12.5-25 mg, 12.5-25 mg, Oral, Q4HRS PRN, Indira Moncada M.D.  •  promethazine (PHENERGAN) suppository 12.5-25 mg, 12.5-25 mg, Rectal, Q4HRS PRN, Indira Moncada M.D.  •  prochlorperazine (COMPAZINE) injection 5-10 mg, 5-10 mg, Intravenous, Q4HRS PRN, Indira Moncada M.D.  •  senna-docusate (PERICOLACE or SENOKOT S) 8.6-50 MG per tablet 2 Tablet, 2 Tablet, Oral, BID, 2 Tablet at 11/02/21 0444 **AND** polyethylene glycol/lytes (MIRALAX) PACKET 1 Packet, 1 Packet, Oral, QDAY PRN **AND** [DISCONTINUED] magnesium hydroxide (MILK OF MAGNESIA) suspension 30 mL, 30 mL, Oral, QDAY PRN **AND** bisacodyl (DULCOLAX) suppository 10 mg, 10 mg, Rectal, QDAY PRN, Indira Moncada M.D.  •  sevelamer carbonate (RENVELA) tablet 3,200 mg, 3,200 mg, Oral, TID WITH MEALS, Indira Moncada M.D., 3,200 mg at 11/02/21 1050  •  sevelamer carbonate (RENVELA) tablet 1,600 mg, 1,600 mg, Oral, BID PRN, Indira Moncada M.D.    Allergies:  Allergies   Allergen Reactions   •  Lorazepam [Ativan] Anxiety and Unspecified     Patient becomes severely paranoid and agitated, hallucinates   • Morphine Itching     Tolerates Dilaudid   • Seasonal Runny Nose and Itching     Hay fever, sabiha brush        Review of systems: In addition to what is detailed in the interval history above, all other systems reviewed and are negative.     Physical Examination:   Vitals:    11/02/21 0100 11/02/21 0434 11/02/21 0451 11/02/21 0856   BP: (!) 161/96 (!) 175/98 (!) 165/98 155/94   Pulse:  88  77   Resp:  18  16   Temp:  36.9 °C (98.5 °F)  36.9 °C (98.4 °F)   TempSrc:  Temporal  Temporal   SpO2:  96%  91%   Weight:       Height:         General: Patient in no acute distress, pale, ill-appearing, and cooperative.  HEENT: Normocephalic, no signs of acute trauma.   Neck: Supple, no meningeal signs or carotid bruits. There is normal range of motion. No tenderness on exam.   Chest: Regular and unlabored breaths. No cough.   CV: RRR.   Skin: No signs of acute rashes or trauma.   Musculoskeletal: Joints exhibit full range of motion, without any pain to palpation. There are no signs of joint or muscle swelling. There is no tenderness to deep palpation of muscles.   Psychiatric: No hallucinatory behavior. Denies symptoms of depression or suicidal ideation. Mood and affect appear flat on exam.     NEUROLOGICAL EXAM:   Mental status, orientation: Awake, alert, following commands, and oriented to self, place, and time, but disoriented to situation.   Speech and language: Speech is clear and fluent. The patient is able to name, repeat, and comprehend.   Memory: There is impaired recollection of recent and remote events.   Cranial nerve exam: Pupils are 3-4 mm bilaterally and equally reactive to light. Visual fields are intact by confrontation. There is no nystagmus on primary or secondary gaze. Intact full EOM in all directions of gaze. Face appears symmetric. Sensation in the face is intact to light touch. Uvula is midline.  Palate elevates symmetrically. Tongue is midline and without any signs of tongue biting or fasciculations. Sternocleidomastoid muscles exhibit is normal strength bilaterally. Shoulder shrug is intact bilaterally.   Motor exam: Strength is 5/5 in all extremities. Tone is normal. Asterixis seen to BUE and BLE.   Sensory exam Reveals normal sense of light touch and pinprick in all extremities.   Deep tendon reflexes:  Deferred   Coordination: No ataxia with a normal finger-nose-finger. Normal rapidly alternating movements.   Gait: Deferred as patient is undergoing peritoneal dialysis currently.       Ancillary Data Reviewed:    Labs:  Lab Results   Component Value Date/Time    PROTHROMBTM 14.2 12/04/2020 04:55 PM    INR 1.07 12/04/2020 04:55 PM      Lab Results   Component Value Date/Time    WBC 4.7 (L) 11/02/2021 03:16 AM    RBC 3.28 (L) 11/02/2021 03:16 AM    HEMOGLOBIN 10.5 (L) 11/02/2021 03:16 AM    HEMATOCRIT 31.0 (L) 11/02/2021 03:16 AM    MCV 94.5 11/02/2021 03:16 AM    MCH 32.0 11/02/2021 03:16 AM    MCHC 33.9 11/02/2021 03:16 AM    MPV 10.6 11/02/2021 03:16 AM    NEUTSPOLYS 63.10 11/01/2021 01:07 PM    LYMPHOCYTES 24.30 11/01/2021 01:07 PM    MONOCYTES 9.60 11/01/2021 01:07 PM    EOSINOPHILS 0.90 11/01/2021 01:07 PM    BASOPHILS 1.60 11/01/2021 01:07 PM    HYPOCHROMIA 1+ 07/26/2017 07:10 AM    ANISOCYTOSIS 1+ 01/04/2019 04:00 AM      Lab Results   Component Value Date/Time    SODIUM 141 11/02/2021 03:16 AM    POTASSIUM 5.3 11/02/2021 03:16 AM    CHLORIDE 98 11/02/2021 03:16 AM    CO2 23 11/02/2021 03:16 AM    GLUCOSE 143 (H) 11/02/2021 03:16 AM    BUN 68 (H) 11/02/2021 03:16 AM    CREATININE 16.71 (HH) 11/02/2021 03:16 AM    CREATININE 0.9 12/13/2008 12:05 AM      Lab Results   Component Value Date/Time    CHOLSTRLTOT 113 06/08/2018 04:34 AM    LDL 64 06/08/2018 04:34 AM    HDL 21 (A) 06/08/2018 04:34 AM    TRIGLYCERIDE 141 06/08/2018 04:34 AM       Lab Results   Component Value Date/Time    ALKPHOSPHAT 145  "(H) 11/02/2021 03:16 AM    ASTSGOT 8 (L) 11/02/2021 03:16 AM    ALTSGPT 10 11/02/2021 03:16 AM    TBILIRUBIN 0.3 11/02/2021 03:16 AM        Imaging/Testing:    I interpreted and/or reviewed the patient's neuroimaging    CT-HEAD W/O   Final Result         NO ACUTE ABNORMALITIES ARE NOTED ON CT SCAN OF THE HEAD.             Assessment and Plan:    Debby Carrizales is a 41 y.o. female with relevant history of ESRD on peritoneal dialysis, lupus, neuropathy, and seizure disorder on Vimpat who presented on 11/1/21 for seizure like activity during dialysis whom neurology was consulted to address seizure. Neurological exam appears stable with disorientation to situation c/w encephalopathy and asterixis without any focal deficits noted. While she may still be post-ictal at this time, her current exam is more likely due to metabolic disturbances given her presentation with multiple electrolyte abnormalities (hyperkalemia) and uremia (creatinine 18.65, 16.71) with dialysis in progress at this time. Etiology of the seizure is likely medication non-compliance as the patient reports missing her AED for a few days.     Plan:    -q4h and PRN neuro assessment. VS per nursing/unit protocol.    -Telemetry; currently SR. Screen for tachyarrhythmia.   -Obtain routine VEEG if no improvement of encephalopathy after completion of dialysis   -Continue Vimpat 100mg BID (home dose)  -Counseled patient at length regarding life style and risk factor modification for seizure prevention including medication compliance.   -Correction of metabolic derangements per hospitalist and Nephrology teams  -[facility]. Referral placed   -Patient reported not seeing prior neurologist for \"more than a year.\"  -DVT PPX: SCDs.      The evaluation of the patient, and recommended management, was discussed with Dr. Yuen, Dr. Weinberg, and bedside RN. I have performed a physical exam and reviewed and updated ROS and Plan " today (11/2/2021). In review of yesterday's note (11/1/2021), there are no changes except as documented above.    PRIYANK Kimbrough.   Nurse Practitioner, Neurohospitalist  [facility]  t) [phone] (f) [phone]

## 2021-11-02 NOTE — ED NOTES
Pt resting in the mcdonald, lethargic but awake to verbal stimuli. Able to tell her name but slow to respond or doesn't respond with other orientation questions asked. VSS at this time. Will continue to monitor

## 2021-11-02 NOTE — ED NOTES
Tech from Lab called with critical result of potassium of 6.8 at 2154. Critical lab result read back to tech.   Dr. Albert notified of critical lab result at 2154.

## 2021-11-02 NOTE — CARE PLAN
The patient is Watcher - Medium risk of patient condition declining or worsening    Shift Goals  Clinical Goals: PD, electrolyte balancing  Patient Goals: Rest, comfort    Progress made toward(s) clinical / shift goals:      Problem: Knowledge Deficit - Standard  Goal: Patient and family/care givers will demonstrate understanding of plan of care, disease process/condition, diagnostic tests and medications  Outcome: Progressing     Problem: Fall Risk  Goal: Patient will remain free from falls  Outcome: Progressing

## 2021-11-02 NOTE — CARE PLAN
The patient is Watcher - Medium risk of patient condition declining or worsening         Progress made toward(s) clinical / shift goals:  Progressing     Problem: Knowledge Deficit - Standard  Goal: Patient and family/care givers will demonstrate understanding of plan of care, disease process/condition, diagnostic tests and medications  Outcome: Progressing  Note: Pt educated on POC. No questions at this time.      Problem: Fall Risk  Goal: Patient will remain free from falls  Outcome: Progressing  Note: Fall precautions put into place.

## 2021-11-02 NOTE — ED NOTES
Med rec completed per Pt's pharmacy [facility] on [address] ([phone]).  Pt unable to participate in interview at this time. Unable to assess allergies with Pt. Unable to verify times of last doses or over-the-counter medication usage.  Pt also had [facility] at [address] [place] [address] listed in her chart, however per [facility] (called 24 hour location on [address] [phone]), Pt has not filled any medications with [facility] since January 2021.  Per [facility], Pt was previously on hydroxychloroquine 200 mg 1 tablet every day, however this medication was last filled for a 90 day supply in April 2021. Hydroxychloroquine removed from med rec at this time.  No oral antibiotics dispensed in last 30 days.

## 2021-11-03 ENCOUNTER — PATIENT OUTREACH (OUTPATIENT)
Dept: HEALTH INFORMATION MANAGEMENT | Facility: OTHER | Age: 41
End: 2021-11-03

## 2021-11-03 LAB
ALBUMIN SERPL BCP-MCNC: 3.5 G/DL (ref 3.2–4.9)
BUN SERPL-MCNC: 26 MG/DL (ref 8–22)
CALCIUM SERPL-MCNC: 8.8 MG/DL (ref 8.5–10.5)
CHLORIDE SERPL-SCNC: 98 MMOL/L (ref 96–112)
CO2 SERPL-SCNC: 26 MMOL/L (ref 20–33)
CREAT SERPL-MCNC: 14.65 MG/DL (ref 0.5–1.4)
ERYTHROCYTE [DISTWIDTH] IN BLOOD BY AUTOMATED COUNT: 38.5 FL (ref 35.9–50)
GLUCOSE SERPL-MCNC: 90 MG/DL (ref 65–99)
HCT VFR BLD AUTO: 31.4 % (ref 37–47)
HGB BLD-MCNC: 10.4 G/DL (ref 12–16)
LACTATE BLD-SCNC: 1.4 MMOL/L (ref 0.5–2)
MAGNESIUM SERPL-MCNC: 2.6 MG/DL (ref 1.5–2.5)
MCH RBC QN AUTO: 31.5 PG (ref 27–33)
MCHC RBC AUTO-ENTMCNC: 33.1 G/DL (ref 33.6–35)
MCV RBC AUTO: 95.2 FL (ref 81.4–97.8)
PHOSPHATE SERPL-MCNC: 5.9 MG/DL (ref 2.5–4.5)
PLATELET # BLD AUTO: 98 K/UL (ref 164–446)
PMV BLD AUTO: 10.6 FL (ref 9–12.9)
POTASSIUM SERPL-SCNC: 4.7 MMOL/L (ref 3.6–5.5)
RBC # BLD AUTO: 3.3 M/UL (ref 4.2–5.4)
SODIUM SERPL-SCNC: 140 MMOL/L (ref 135–145)
WBC # BLD AUTO: 2.6 K/UL (ref 4.8–10.8)

## 2021-11-03 PROCEDURE — 700111 HCHG RX REV CODE 636 W/ 250 OVERRIDE (IP): Performed by: STUDENT IN AN ORGANIZED HEALTH CARE EDUCATION/TRAINING PROGRAM

## 2021-11-03 PROCEDURE — 99232 SBSQ HOSP IP/OBS MODERATE 35: CPT | Mod: GC | Performed by: FAMILY MEDICINE

## 2021-11-03 PROCEDURE — 99232 SBSQ HOSP IP/OBS MODERATE 35: CPT | Performed by: NURSE PRACTITIONER

## 2021-11-03 PROCEDURE — 85027 COMPLETE CBC AUTOMATED: CPT

## 2021-11-03 PROCEDURE — 770020 HCHG ROOM/CARE - TELE (206)

## 2021-11-03 PROCEDURE — 83735 ASSAY OF MAGNESIUM: CPT

## 2021-11-03 PROCEDURE — 700102 HCHG RX REV CODE 250 W/ 637 OVERRIDE(OP): Performed by: HOSPITALIST

## 2021-11-03 PROCEDURE — A9270 NON-COVERED ITEM OR SERVICE: HCPCS | Performed by: HOSPITALIST

## 2021-11-03 PROCEDURE — 90945 DIALYSIS ONE EVALUATION: CPT

## 2021-11-03 PROCEDURE — 80069 RENAL FUNCTION PANEL: CPT

## 2021-11-03 PROCEDURE — 36415 COLL VENOUS BLD VENIPUNCTURE: CPT

## 2021-11-03 PROCEDURE — 83605 ASSAY OF LACTIC ACID: CPT

## 2021-11-03 RX ADMIN — ESCITALOPRAM OXALATE 10 MG: 10 TABLET ORAL at 04:09

## 2021-11-03 RX ADMIN — PREGABALIN 50 MG: 25 CAPSULE ORAL at 20:53

## 2021-11-03 RX ADMIN — DOCUSATE SODIUM 50 MG AND SENNOSIDES 8.6 MG 2 TABLET: 8.6; 5 TABLET, FILM COATED ORAL at 17:17

## 2021-11-03 RX ADMIN — SEVELAMER CARBONATE 3200 MG: 800 TABLET, FILM COATED ORAL at 17:17

## 2021-11-03 RX ADMIN — ACETAMINOPHEN 650 MG: 325 TABLET ORAL at 08:35

## 2021-11-03 RX ADMIN — DOCUSATE SODIUM 50 MG AND SENNOSIDES 8.6 MG 2 TABLET: 8.6; 5 TABLET, FILM COATED ORAL at 04:09

## 2021-11-03 RX ADMIN — LISINOPRIL 20 MG: 20 TABLET ORAL at 04:09

## 2021-11-03 RX ADMIN — LACOSAMIDE 100 MG: 100 TABLET, FILM COATED ORAL at 17:17

## 2021-11-03 RX ADMIN — HYDRALAZINE HYDROCHLORIDE 20 MG: 20 INJECTION INTRAMUSCULAR; INTRAVENOUS at 00:30

## 2021-11-03 RX ADMIN — SEVELAMER CARBONATE 3200 MG: 800 TABLET, FILM COATED ORAL at 11:49

## 2021-11-03 RX ADMIN — ACETAMINOPHEN 650 MG: 325 TABLET ORAL at 00:21

## 2021-11-03 RX ADMIN — LACOSAMIDE 100 MG: 100 TABLET, FILM COATED ORAL at 04:09

## 2021-11-03 RX ADMIN — PREGABALIN 50 MG: 25 CAPSULE ORAL at 08:35

## 2021-11-03 RX ADMIN — AMLODIPINE BESYLATE 10 MG: 10 TABLET ORAL at 04:09

## 2021-11-03 ASSESSMENT — FIBROSIS 4 INDEX: FIB4 SCORE: 1.01

## 2021-11-03 ASSESSMENT — PAIN DESCRIPTION - PAIN TYPE: TYPE: ACUTE PAIN

## 2021-11-03 NOTE — PROGRESS NOTES
[facility] Progress Note     [employer] treatment aseptically connected at 1800 to run x 10 Hours using 3 bags of 2.5 % dextrose solutions. Dressing change done by dialysis RN this morning. In service provided to primary RN.      Report given to primary care RN KALEY including troubleshooting, emergency disconnection procedure,  on-call Dialysis RN contact information ([phone]).      Report given to Primary RN.  Charlie Hays RN

## 2021-11-03 NOTE — PROGRESS NOTES
CHW Narciso attempted to meet with patient bedside and patient was having difficulty answering questions . CHW will attempt to meet with patient at a later date.

## 2021-11-03 NOTE — PROGRESS NOTES
Bedside report received from day RN, pt care assumed, assessment completed. Pt is A&O1-2, pain 0,SR on the monitor. Updated on POC, questions answered. Bed in lowest, locked position, treaded socks on, call light and belongings within reach. Fall precautions in place.

## 2021-11-03 NOTE — PROGRESS NOTES
[facility] Progress Note    [employer] disconnected aseptically at 0640.   Patient stable, alert and oriented x 3.   PD exit site assessed. No s/sx of infection, no redness, no discharges. PD dressing clean dry and intact.    Due PD dressing tonight.    Effluent is clear and yellow without fibrin noted.       24 hour UF: 1358 mL  (Total UF 1,237mL   + Initial drain 2,121mL  - Last fill 2,000 mL)      Report given to Primary CHASIDY Pfeiffer R.N.

## 2021-11-03 NOTE — PROGRESS NOTES
"Neurology Progress Note  Neurohospitalist Service, [facility]    Referring Physician: Antonio Schmitt M.D.    Chief Complaint   Patient presents with   • Seizure     pt was at dialysis when had seizure. pt post ictal- oriented to person, place and time but not event. pt has \"foggy brain\".        HPI: Refer to initial documented Neurology H&P, as detailed in the patient's chart.    Interval History 11/3/2021: No acute events overnight, including no further clinical seizure activity. Currently awake, alert, fully oriented, and following commands. States feeling \"more herself today,\" but not completely returned to normal. Denies headache, vision changes, facial or focal weakness, numbness, tingling, abnormal movements, further seizures, or falls.     Past Medical History:   Past Medical History:   Diagnosis Date   • Anemia    • Arthritis     all joints,r/t lupus   • Avascular necrosis of bones of both hips (HCC) 10/10/2016   • Blood clotting disorder ([facility])     in AV Graft   • Clostridium difficile colitis 5/3/2011   • Dialysis patient    • ESBL (extended spectrum beta-lactamase) producing bacteria infection 8/25/2014   • Fall    • Fibromyalgia    • High anion gap metabolic acidosis 4/2/2011   • Hypertension    • Lupus ([facility])    • Pneumonia    • Psychiatric disorder     anxiety, depression   • Pyelonephritis 11/21/2017   • Renal failure    • Sepsis due to pneumonia ([facility]) 6/7/2018   • Status epilepticus ([facility]) 12/13/2018    last seizure 2 mos ago 03/2021        FHx:  Family History   Problem Relation Age of Onset   • Heart Disease Mother    • Cancer Father         SHx:  Social History     Socioeconomic History   • Marital status: Single     Spouse name: Not on file   • Number of children: Not on file   • Years of education: Not on file   • Highest education level: Not on file   Occupational History   • Not on file   Tobacco Use   • Smoking status: Current Every Day Smoker     Packs/day: 0.50     " Years: 18.00     Pack years: 9.00     Start date: 1995     Last attempt to quit: 6/13/2011     Years since quitting: 10.4   • Smokeless tobacco: Never Used   • Tobacco comment: vaping now   Vaping Use   • Vaping Use: Some days   • Substances: Flavoring   • Devices: Disposable   Substance and Sexual Activity   • Alcohol use: No     Alcohol/week: 0.0 oz   • Drug use: No     Types: Marijuana   • Sexual activity: Not Currently     Partners: Male   Other Topics Concern   •  Service No   • Blood Transfusions Yes   • Caffeine Concern No   • Occupational Exposure No   • Hobby Hazards No   • Sleep Concern Yes   • Stress Concern Yes   • Weight Concern Yes   • Special Diet Yes   • Back Care No   • Exercise Yes   • Bike Helmet No   • Seat Belt Yes   • Self-Exams Yes   Social History Narrative   • Not on file     Social Determinants of Health     Financial Resource Strain: Medium Risk   • Difficulty of Paying Living Expenses: Somewhat hard   Food Insecurity: Food Insecurity Present   • Worried About Running Out of Food in the Last Year: Often true   • Ran Out of Food in the Last Year: Often true   Transportation Needs: Unmet Transportation Needs   • Lack of Transportation (Medical): Yes   • Lack of Transportation (Non-Medical): Yes   Physical Activity:    • Days of Exercise per Week:    • Minutes of Exercise per Session:    Stress:    • Feeling of Stress :    Social Connections:    • Frequency of Communication with Friends and Family:    • Frequency of Social Gatherings with Friends and Family:    • Attends Jew Services:    • Active Member of Clubs or Organizations:    • Attends Club or Organization Meetings:    • Marital Status:    Intimate Partner Violence:    • Fear of Current or Ex-Partner:    • Emotionally Abused:    • Physically Abused:    • Sexually Abused:         Medications:    Current Facility-Administered Medications:   •  hydrALAZINE (APRESOLINE) injection 20 mg, 20 mg, Intravenous, Q6HRS Barb REYNA  RAMON Mota, 20 mg at 11/03/21 0030  •  amLODIPine (NORVASC) tablet 10 mg, 10 mg, Oral, DAILY, Indira Moncada M.D., 10 mg at 11/03/21 0409  •  escitalopram (Lexapro) tablet 10 mg, 10 mg, Oral, DAILY, Indira Moncada M.D., 10 mg at 11/03/21 0409  •  lacosamide (VIMPAT) tablet 100 mg, 100 mg, Oral, BID, Indira Moncada M.D., 100 mg at 11/03/21 0409  •  lisinopril (PRINIVIL) tablet 20 mg, 20 mg, Oral, DAILY, Indira Moncada M.D., 20 mg at 11/03/21 0409  •  pregabalin (LYRICA) capsule 50 mg, 50 mg, Oral, BID, Indira Moncada M.D., 50 mg at 11/03/21 0835  •  acetaminophen (Tylenol) tablet 650 mg, 650 mg, Oral, Q6HRS PRN, Indira Moncada M.D., 650 mg at 11/03/21 0835  •  ondansetron (ZOFRAN) syringe/vial injection 4 mg, 4 mg, Intravenous, Q4HRS PRN, Indira Moncada M.D., 4 mg at 11/02/21 1050  •  ondansetron (ZOFRAN ODT) dispertab 4 mg, 4 mg, Oral, Q4HRS PRN, Indira Moncada M.D.  •  promethazine (PHENERGAN) tablet 12.5-25 mg, 12.5-25 mg, Oral, Q4HRS PRN, Indira Moncada M.D.  •  promethazine (PHENERGAN) suppository 12.5-25 mg, 12.5-25 mg, Rectal, Q4HRS PRN, Indira Moncada M.D.  •  prochlorperazine (COMPAZINE) injection 5-10 mg, 5-10 mg, Intravenous, Q4HRS PRN, Indira Moncada M.D.  •  senna-docusate (PERICOLACE or SENOKOT S) 8.6-50 MG per tablet 2 Tablet, 2 Tablet, Oral, BID, 2 Tablet at 11/03/21 0409 **AND** polyethylene glycol/lytes (MIRALAX) PACKET 1 Packet, 1 Packet, Oral, QDAY PRN **AND** [DISCONTINUED] magnesium hydroxide (MILK OF MAGNESIA) suspension 30 mL, 30 mL, Oral, QDAY PRN **AND** bisacodyl (DULCOLAX) suppository 10 mg, 10 mg, Rectal, QDAY PRN, Indira Moncada M.D.  •  sevelamer carbonate (RENVELA) tablet 3,200 mg, 3,200 mg, Oral, TID WITH MEALS, Indira Moncada M.D., 3,200 mg at 11/03/21 1149  •  sevelamer carbonate (RENVELA) tablet 1,600 mg, 1,600 mg, Oral, BID PRN, Indira Moncada  M.D.    Allergies:  Allergies   Allergen Reactions   • Lorazepam [Ativan] Anxiety and Unspecified     Patient becomes severely paranoid and agitated, hallucinates   • Morphine Itching     Tolerates Dilaudid   • Seasonal Runny Nose and Itching     Hay fever, sabiha brush        Review of systems: In addition to what is detailed in the interval history above, all other systems reviewed and are negative.    Physical Examination:  Vitals:    11/03/21 0025 11/03/21 0100 11/03/21 0400 11/03/21 0800   BP: (!) 176/104 138/87 125/88 149/90   Pulse: 63  67 76   Resp: 18  18 18   Temp: 36.4 °C (97.5 °F)  36.4 °C (97.5 °F) 36.8 °C (98.3 °F)   TempSrc: Temporal  Temporal Temporal   SpO2: 92%  93% 98%   Weight:       Height:         General: Patient in no acute distress, pale, ill-appearing, and cooperative.  HEENT: Normocephalic, no signs of acute trauma.   Neck: Supple, no meningeal signs or carotid bruits. There is normal range of motion. No tenderness on exam.   Chest: Regular and unlabored breaths. No cough.   CV: RRR.   Skin: No signs of acute rashes or trauma.   Musculoskeletal: Joints exhibit full range of motion, without any pain to palpation. There are no signs of joint or muscle swelling. There is no tenderness to deep palpation of muscles.   Psychiatric: No hallucinatory behavior. Denies symptoms of depression or suicidal ideation. Mood and affect appear flat on exam.      NEUROLOGICAL EXAM:   Mental status, orientation: Awake, alert, following commands, and fully oriented.   Speech and language: Speech is clear and fluent with intermittent perseverative speech. The patient is able to name, repeat, and comprehend.   Memory: There is impaired recollection of recent and remote events.   Cranial nerve exam: Pupils are 3-4 mm bilaterally and equally reactive to light. Visual fields are intact by confrontation. There is no nystagmus on primary or secondary gaze. Intact full EOM in all directions of gaze. Face appears  symmetric. Sensation in the face is intact to light touch. Uvula is midline. Palate elevates symmetrically. Tongue is midline and without any signs of tongue biting or fasciculations. Sternocleidomastoid muscles exhibit is normal strength bilaterally. Shoulder shrug is intact bilaterally.   Motor exam: Strength is 5/5 in all extremities. Tone is normal. Asterixis seen to ELENA, improved today.   Sensory exam Reveals normal sense of light touch and pinprick in all extremities.   Deep tendon reflexes:  Deferred   Coordination: No ataxia with a normal finger-nose-finger. Normal rapidly alternating movements.   Gait: Deferred per patient preference.       Ancillary Data Reviewed:    Labs:  Lab Results   Component Value Date/Time    PROTHROMBTM 14.2 12/04/2020 04:55 PM    INR 1.07 12/04/2020 04:55 PM      Lab Results   Component Value Date/Time    WBC 2.6 (L) 11/03/2021 01:10 AM    RBC 3.30 (L) 11/03/2021 01:10 AM    HEMOGLOBIN 10.4 (L) 11/03/2021 01:10 AM    HEMATOCRIT 31.4 (L) 11/03/2021 01:10 AM    MCV 95.2 11/03/2021 01:10 AM    MCH 31.5 11/03/2021 01:10 AM    MCHC 33.1 (L) 11/03/2021 01:10 AM    MPV 10.6 11/03/2021 01:10 AM    NEUTSPOLYS 63.10 11/01/2021 01:07 PM    LYMPHOCYTES 24.30 11/01/2021 01:07 PM    MONOCYTES 9.60 11/01/2021 01:07 PM    EOSINOPHILS 0.90 11/01/2021 01:07 PM    BASOPHILS 1.60 11/01/2021 01:07 PM    HYPOCHROMIA 1+ 07/26/2017 07:10 AM    ANISOCYTOSIS 1+ 01/04/2019 04:00 AM      Lab Results   Component Value Date/Time    SODIUM 140 11/03/2021 09:21 AM    POTASSIUM 4.7 11/03/2021 09:21 AM    CHLORIDE 98 11/03/2021 09:21 AM    CO2 26 11/03/2021 09:21 AM    GLUCOSE 90 11/03/2021 09:21 AM    BUN 26 (H) 11/03/2021 09:21 AM    CREATININE 14.65 (HH) 11/03/2021 09:21 AM    CREATININE 0.9 12/13/2008 12:05 AM      Lab Results   Component Value Date/Time    CHOLSTRLTOT 113 06/08/2018 04:34 AM    LDL 64 06/08/2018 04:34 AM    HDL 21 (A) 06/08/2018 04:34 AM    TRIGLYCERIDE 141 06/08/2018 04:34 AM       Lab  Results   Component Value Date/Time    ALKPHOSPHAT 145 (H) 11/02/2021 03:16 AM    ASTSGOT 8 (L) 11/02/2021 03:16 AM    ALTSGPT 10 11/02/2021 03:16 AM    TBILIRUBIN 0.3 11/02/2021 03:16 AM        Imaging/Testing:    I interpreted and/or reviewed the patient's neuroimaging    CT-HEAD W/O   Final Result         NO ACUTE ABNORMALITIES ARE NOTED ON CT SCAN OF THE HEAD.             Assessment and Plan:       Debby Carrizales is a 41 y.o. female with relevant history of ESRD on peritoneal dialysis, lupus, neuropathy, and seizure disorder on Vimpat who presented on 11/1/21 for seizure like activity during dialysis whom neurology was consulted to address seizure. Neurological exam appears stable with disorientation to situation c/w encephalopathy and asterixis without any focal deficits noted. Her current exam is more likely due to metabolic disturbances given her presentation with multiple electrolyte abnormalities (hyperkalemia) and uremia (creatinine 18.65, 16.71) with prolonged peritoneal dialysis (18 hours) completed this morning. Etiology of the seizure is likely medication non-compliance as the patient reports missing her AED for a few days.      Plan:     -q4h and PRN neuro assessment. VS per nursing/unit protocol.    -Telemetry; currently SR. Screen for tachyarrhythmia.   -No utility for routine VEEG at this time as showing modest improvement of encephalopathy after completion of dialysis. No further clinical seizure like activity since admission  -Continue Vimpat 100mg BID (home dose)  -Counseled patient at length regarding life style and risk factor modification for seizure prevention including medication compliance.   -Correction of metabolic derangements per hospitalist and Nephrology teams  -[facility]. Referral placed  -DVT PPX: SCDs.     No further recommendations or further studies from a neurological standpoint at this time. Please re-consult if you have further  questions or there is a change in status.      The evaluation of the patient, and recommended management, was discussed with Dr. Yuen, Dr. Schmitt, and bedside RN. I have performed a physical exam and reviewed and updated ROS and Plan today (11/3/2021). In review of yesterday's note (11/2/2021), there are no changes except as documented above.    PRIYANK Kimbrough.   Nurse Practitioner, Neurohospitalist  [facility]  t) [phone] (f) [phone]

## 2021-11-03 NOTE — CARE PLAN
Problem: Knowledge Deficit - Standard  Goal: Patient and family/care givers will demonstrate understanding of plan of care, disease process/condition, diagnostic tests and medications  Outcome: Progressing  Note: Discussed POC and medications with patient. Pt verbalized understanding.      Problem: Fall Risk  Goal: Patient will remain free from falls  Outcome: Progressing  Note: Bed locked and in lowest position. Bed alarm on. Treaded socks provided. Call light and belongings within reach.  Patient educated to call for assistance. Pt verbalized understanding. Hourly rounding in place.      The patient is Watcher - Medium risk of patient condition declining or worsening    Shift Goals  Clinical Goals: PD, monitor mentation  Patient Goals: rest    Progress made toward(s) clinical / shift goals:  yes    Patient is not progressing towards the following goals:

## 2021-11-03 NOTE — PROCEDURES
Nephrology/Peritoneal dialysis note    Patient with ESRD/HD admitted with AMS, seizures  Seen and examined while connected to [facility]  Tolerates well  VS stable  Hyperkalemia corrected  Please see dialysis flow sheet for details

## 2021-11-03 NOTE — PROGRESS NOTES
Bedside report received. Patient A&O x2, disoriented to time and event. RA.  on the monitor. POC discussed with patient. Patient verbalized understanding. Call light and belongings within reach. Bed locked and in lowest position, alarm and fall precautions in place.

## 2021-11-03 NOTE — PROGRESS NOTES
[facility] FAMILY MEDICINE PROGRESS NOTE        Attending:   Dr. Schmitt    Resident:   RAMON Clayton M.D.    PATIENT:   Debby Carrizales; [id number]; 1980    ID:   41 y.o. female admitted on 11/1/2021 for hyperkalemia and seizure with altered mental status.    Patient with a history of ESRD on peritoneal dialysis, seizures on Vimpat, lupus on Lyrica.  She presented to the emergency department on 11/1 after having seizure-like activity during her regularly scheduled peritoneal dialysis.  Per report the patient became significantly confused and told staff that she had not been taking her seizure medication.  She was unable to provide any further history in the emergency department but continued to appear confused for 6 hours, much longer than would be expected from a postictal state.  Patient was unable to state what year was and could not answer simple questions.    SUBJECTIVE:   No acute events overnight, this morning patient states she is in no pain and has had no nausea or vomiting.  She appears to be alert and oriented to self and knows that she is in [place] but is unable to tell me what type of building she is in.  Patient states that she was able to sleep okay and feels that she is not confused.  Patient is unable to reliably answer questions, staring at interviewer but not providing answers, cannot provide any further history at this time.    OBJECTIVE:  Vitals:    11/02/21 1857 11/03/21 0025 11/03/21 0100 11/03/21 0400   BP: 156/84 (!) 176/104 138/87 125/88   Pulse: 66 63  67   Resp: 18 18  18   Temp: 36.4 °C (97.5 °F) 36.4 °C (97.5 °F)  36.4 °C (97.5 °F)   TempSrc: Temporal Temporal  Temporal   SpO2: 96% 92%  93%   Weight: 88.1 kg (194 lb 3.6 oz)      Height:           Intake/Output Summary (Last 24 hours) at 11/3/2021 0626  Last data filed at 11/2/2021 0800  Gross per 24 hour   Intake 50 ml   Output --   Net 50 ml       PHYSICAL EXAM:  General: No acute distress, afebrile, resting  comfortably in bed pulling her hair into a ponytail.  Poor historian.  HEENT: NC/AT.  CHRIS.  VALARIEMI. no scleral icterus.  Cardiovascular: RRR without murmurs. Normal capillary refill   Respiratory: Normal inspiratory effort, CTAB, no respiratory distress, no tachypnea or retractions  Abdomen: soft, nontender, nondistended, no masses  EXT:  CHESTER spontaneously, no edema, DTRs appear hyperreflexic  Skin: No erythema/lesions   Neuro: Non-focal, cranial nerves II through XII grossly intact.  5/5 strength, sensation intact, patient is alert and oriented x2 (self and city), patient is able to follow simple commands but unable to state what type of building she is in.    LABS:  Recent Labs     11/01/21  1307 11/02/21 0316 11/03/21 0110   WBC 4.4* 4.7* 2.6*   RBC 3.64* 3.28* 3.30*   HEMOGLOBIN 11.3* 10.5* 10.4*   HEMATOCRIT 34.3* 31.0* 31.4*   MCV 94.2 94.5 95.2   MCH 31.0 32.0 31.5   RDW 39.2 38.8 38.5   PLATELETCT 107* 99* 98*   MPV 10.5 10.6 10.6   NEUTSPOLYS 63.10  --   --    LYMPHOCYTES 24.30  --   --    MONOCYTES 9.60  --   --    EOSINOPHILS 0.90  --   --    BASOPHILS 1.60  --   --      Recent Labs     11/01/21  1307 11/01/21 2012 11/02/21 0316 11/03/21 0110   SODIUM 140 141 141  --    POTASSIUM 6.1* 6.8* 5.3  --    CHLORIDE 98 99 98  --    CO2 23 23 23  --    BUN 70* 72* 68*  --    CREATININE 16.57* 18.65* 16.71*  --    CALCIUM 8.3* 8.2* 8.7  --    MAGNESIUM  --   --   --  2.6*   ALBUMIN 4.2  --  3.6  --      Estimated GFR/CRCL = Estimated Creatinine Clearance: 5 mL/min (A) (by C-G formula based on SCr of 16.71 mg/dL ()).  Recent Labs     11/01/21  1257 11/01/21  1307 11/01/21 2012 11/01/21  2337 11/02/21  0011 11/02/21 0316   GLUCOSE  --  79 79  --   --  143*   POCGLUCOSE 84  --   --  78 156*  --      Recent Labs     11/01/21  1307 11/01/21 2012 11/02/21 0316   ASTSGOT 9*  --  8*   ALTSGPT 5  --  10   TBILIRUBIN 0.3  --  0.3   ALKPHOSPHAT 166*  --  145*   GLOBULIN 2.6  --  2.6   AMMONIA  --  19  --         IMAGING:  CT-HEAD W/O   Final Result         NO ACUTE ABNORMALITIES ARE NOTED ON CT SCAN OF THE HEAD.             MEDS:  amLODIPine, 10 mg, Oral, DAILY  escitalopram, 10 mg, Oral, DAILY  lacosamide, 100 mg, Oral, BID  lisinopril, 20 mg, Oral, DAILY  pregabalin, 50 mg, Oral, BID  senna-docusate, 2 Tablet, Oral, BID  sevelamer carbonate, 3,200 mg, Oral, TID WITH MEALS        ASSESSMENT/PLAN:    #Acute encephalopathy  -Unclear etiology.  Suspected neurologic versus metabolic versus hypertensive  -Observation status on telemetry unit.  -Given that the patient reported that she had not been taking her Vimpat with observed suspected seizure activity, and the patient's hyperreflexia on exam, a neurologic consult is possible.  Patient's anion gap elevated at 20, ammonia level was normal and BUN was 70, and metabolic causes are also within reason.  -Neurology consulted, appreciate recommendations.  Patient was evaluated by Dr. Saleh in the emergency department.  No additional imaging recommended.  -Continue Vimpat.  Initially loaded and then continued at 100 mg twice daily (home dose  -Consider EEG if patient continues not to improve.   -Per neurology recommendations will perform EEG in the a.m. if symptoms do not improve.  No need for further neuroimaging at this time.  -11/30: Lactate 1.4, phosphorus 5.9, magnesium 2.6, creatinine 14.65, BUN 26    #Hyperkalemia  -Telemetry  -Initial potassium in the ED of 6.1 and second potassium 7 hours later 6.8  -Nephrology consulted, appreciate recommendations  -Patient received hyperkalemia protocol: Glucose/insulin, calcium gluconate, sodium bicarb and Veltassa.  -EKG performed without significantly peaked T waves  -Peritoneal dialysis    #End-stage renal disease  -Nephrology consulted, appreciate recommendations  -Peritoneal dialysis  -Monitor BMP and hemoglobin  -Renal diet  -Nephro protection, renal dose medications    #Seizure disorder   -Neurology consulted, appreciate  recommendations  -Patient initially loaded with Vimpat in the ED.    -Continue Vimpat 100 mg twice daily (home dose)     #Hypertension  -Blood pressure labile ranging from 125/88 to 176/104  -Amlodipine 10 mg, lisinopril 20 mg  -As needed hydralazine 20 mg every 6 hours for blood pressures greater than 700 systolic    Core Measures:   Fluids: None  Lines: PIV   Abx: None  DVT prophylaxis: Renal  Code Status: FULL CODE     Disposition: Continued inpatient admission for PD, workup and observation     Gerson Chawla MD   PGY-2 Family Medicine Resident   [facility]

## 2021-11-04 LAB
ALBUMIN SERPL BCP-MCNC: 3.4 G/DL (ref 3.2–4.9)
ALBUMIN SERPL BCP-MCNC: 3.5 G/DL (ref 3.2–4.9)
ALBUMIN/GLOB SERPL: 1.3 G/DL
ALP SERPL-CCNC: 138 U/L (ref 30–99)
ALT SERPL-CCNC: 6 U/L (ref 2–50)
ANION GAP SERPL CALC-SCNC: 16 MMOL/L (ref 7–16)
AST SERPL-CCNC: 9 U/L (ref 12–45)
BASOPHILS # BLD AUTO: 1.9 % (ref 0–1.8)
BASOPHILS # BLD: 0.05 K/UL (ref 0–0.12)
BILIRUB SERPL-MCNC: 0.3 MG/DL (ref 0.1–1.5)
BUN SERPL-MCNC: 46 MG/DL (ref 8–22)
BUN SERPL-MCNC: 46 MG/DL (ref 8–22)
CALCIUM SERPL-MCNC: 8.7 MG/DL (ref 8.5–10.5)
CALCIUM SERPL-MCNC: 8.9 MG/DL (ref 8.5–10.5)
CHLORIDE SERPL-SCNC: 97 MMOL/L (ref 96–112)
CHLORIDE SERPL-SCNC: 97 MMOL/L (ref 96–112)
CO2 SERPL-SCNC: 27 MMOL/L (ref 20–33)
CO2 SERPL-SCNC: 27 MMOL/L (ref 20–33)
CREAT SERPL-MCNC: 13.54 MG/DL (ref 0.5–1.4)
CREAT SERPL-MCNC: 13.59 MG/DL (ref 0.5–1.4)
EOSINOPHIL # BLD AUTO: 0.05 K/UL (ref 0–0.51)
EOSINOPHIL NFR BLD: 1.9 % (ref 0–6.9)
ERYTHROCYTE [DISTWIDTH] IN BLOOD BY AUTOMATED COUNT: 37.8 FL (ref 35.9–50)
GLOBULIN SER CALC-MCNC: 2.6 G/DL (ref 1.9–3.5)
GLUCOSE SERPL-MCNC: 129 MG/DL (ref 65–99)
GLUCOSE SERPL-MCNC: 94 MG/DL (ref 65–99)
HCT VFR BLD AUTO: 30.9 % (ref 37–47)
HGB BLD-MCNC: 10.5 G/DL (ref 12–16)
IMM GRANULOCYTES # BLD AUTO: 0.01 K/UL (ref 0–0.11)
IMM GRANULOCYTES NFR BLD AUTO: 0.4 % (ref 0–0.9)
LYMPHOCYTES # BLD AUTO: 0.95 K/UL (ref 1–4.8)
LYMPHOCYTES NFR BLD: 35.6 % (ref 22–41)
MAGNESIUM SERPL-MCNC: 2.4 MG/DL (ref 1.5–2.5)
MCH RBC QN AUTO: 31.7 PG (ref 27–33)
MCHC RBC AUTO-ENTMCNC: 34 G/DL (ref 33.6–35)
MCV RBC AUTO: 93.4 FL (ref 81.4–97.8)
MONOCYTES # BLD AUTO: 0.37 K/UL (ref 0–0.85)
MONOCYTES NFR BLD AUTO: 13.9 % (ref 0–13.4)
NEUTROPHILS # BLD AUTO: 1.24 K/UL (ref 2–7.15)
NEUTROPHILS NFR BLD: 46.3 % (ref 44–72)
NRBC # BLD AUTO: 0 K/UL
NRBC BLD-RTO: 0 /100 WBC
PHOSPHATE SERPL-MCNC: 6.2 MG/DL (ref 2.5–4.5)
PHOSPHATE SERPL-MCNC: 6.4 MG/DL (ref 2.5–4.5)
PLATELET # BLD AUTO: 99 K/UL (ref 164–446)
PMV BLD AUTO: 10.4 FL (ref 9–12.9)
POTASSIUM SERPL-SCNC: 4.4 MMOL/L (ref 3.6–5.5)
POTASSIUM SERPL-SCNC: 4.8 MMOL/L (ref 3.6–5.5)
PROT SERPL-MCNC: 6.1 G/DL (ref 6–8.2)
RBC # BLD AUTO: 3.31 M/UL (ref 4.2–5.4)
SODIUM SERPL-SCNC: 139 MMOL/L (ref 135–145)
SODIUM SERPL-SCNC: 140 MMOL/L (ref 135–145)
WBC # BLD AUTO: 2.7 K/UL (ref 4.8–10.8)

## 2021-11-04 PROCEDURE — 99232 SBSQ HOSP IP/OBS MODERATE 35: CPT | Mod: GC | Performed by: FAMILY MEDICINE

## 2021-11-04 PROCEDURE — 90945 DIALYSIS ONE EVALUATION: CPT | Performed by: INTERNAL MEDICINE

## 2021-11-04 PROCEDURE — 83735 ASSAY OF MAGNESIUM: CPT

## 2021-11-04 PROCEDURE — A9270 NON-COVERED ITEM OR SERVICE: HCPCS | Performed by: HOSPITALIST

## 2021-11-04 PROCEDURE — 90945 DIALYSIS ONE EVALUATION: CPT

## 2021-11-04 PROCEDURE — 80053 COMPREHEN METABOLIC PANEL: CPT

## 2021-11-04 PROCEDURE — 36415 COLL VENOUS BLD VENIPUNCTURE: CPT

## 2021-11-04 PROCEDURE — 770020 HCHG ROOM/CARE - TELE (206)

## 2021-11-04 PROCEDURE — A9270 NON-COVERED ITEM OR SERVICE: HCPCS | Performed by: STUDENT IN AN ORGANIZED HEALTH CARE EDUCATION/TRAINING PROGRAM

## 2021-11-04 PROCEDURE — 85025 COMPLETE CBC W/AUTO DIFF WBC: CPT

## 2021-11-04 PROCEDURE — 700102 HCHG RX REV CODE 250 W/ 637 OVERRIDE(OP): Performed by: STUDENT IN AN ORGANIZED HEALTH CARE EDUCATION/TRAINING PROGRAM

## 2021-11-04 PROCEDURE — 700111 HCHG RX REV CODE 636 W/ 250 OVERRIDE (IP): Performed by: HOSPITALIST

## 2021-11-04 PROCEDURE — 84100 ASSAY OF PHOSPHORUS: CPT

## 2021-11-04 PROCEDURE — 700102 HCHG RX REV CODE 250 W/ 637 OVERRIDE(OP): Performed by: HOSPITALIST

## 2021-11-04 PROCEDURE — 80069 RENAL FUNCTION PANEL: CPT

## 2021-11-04 RX ORDER — OXYCODONE HCL 10 MG/1
10 TABLET, FILM COATED, EXTENDED RELEASE ORAL 2 TIMES DAILY PRN
Status: DISCONTINUED | OUTPATIENT
Start: 2021-11-04 | End: 2021-11-04

## 2021-11-04 RX ORDER — TRAZODONE HYDROCHLORIDE 50 MG/1
50 TABLET ORAL
Status: DISCONTINUED | OUTPATIENT
Start: 2021-11-04 | End: 2021-11-05 | Stop reason: HOSPADM

## 2021-11-04 RX ORDER — OXYCODONE HYDROCHLORIDE 10 MG/1
10 TABLET ORAL EVERY 6 HOURS PRN
Status: DISCONTINUED | OUTPATIENT
Start: 2021-11-04 | End: 2021-11-05 | Stop reason: HOSPADM

## 2021-11-04 RX ORDER — OXYCODONE HYDROCHLORIDE 5 MG/1
5 TABLET ORAL EVERY 6 HOURS PRN
Status: DISCONTINUED | OUTPATIENT
Start: 2021-11-04 | End: 2021-11-04

## 2021-11-04 RX ORDER — TIZANIDINE 4 MG/1
4 TABLET ORAL NIGHTLY PRN
Status: DISCONTINUED | OUTPATIENT
Start: 2021-11-04 | End: 2021-11-05 | Stop reason: HOSPADM

## 2021-11-04 RX ORDER — OXYCODONE HYDROCHLORIDE 10 MG/1
10 TABLET ORAL EVERY 6 HOURS PRN
Status: DISCONTINUED | OUTPATIENT
Start: 2021-11-04 | End: 2021-11-04

## 2021-11-04 RX ADMIN — AMLODIPINE BESYLATE 10 MG: 10 TABLET ORAL at 04:07

## 2021-11-04 RX ADMIN — TIZANIDINE 4 MG: 4 TABLET ORAL at 21:11

## 2021-11-04 RX ADMIN — ONDANSETRON 4 MG: 4 TABLET, ORALLY DISINTEGRATING ORAL at 17:33

## 2021-11-04 RX ADMIN — ONDANSETRON 4 MG: 4 TABLET, ORALLY DISINTEGRATING ORAL at 04:07

## 2021-11-04 RX ADMIN — SEVELAMER CARBONATE 3200 MG: 800 TABLET, FILM COATED ORAL at 17:30

## 2021-11-04 RX ADMIN — PREGABALIN 50 MG: 25 CAPSULE ORAL at 10:44

## 2021-11-04 RX ADMIN — TRAZODONE HYDROCHLORIDE 50 MG: 50 TABLET ORAL at 21:12

## 2021-11-04 RX ADMIN — OXYCODONE HYDROCHLORIDE 10 MG: 10 TABLET ORAL at 17:33

## 2021-11-04 RX ADMIN — LISINOPRIL 20 MG: 20 TABLET ORAL at 04:07

## 2021-11-04 RX ADMIN — DOCUSATE SODIUM 50 MG AND SENNOSIDES 8.6 MG 2 TABLET: 8.6; 5 TABLET, FILM COATED ORAL at 04:07

## 2021-11-04 RX ADMIN — ESCITALOPRAM OXALATE 10 MG: 10 TABLET ORAL at 04:07

## 2021-11-04 RX ADMIN — LACOSAMIDE 100 MG: 100 TABLET, FILM COATED ORAL at 17:33

## 2021-11-04 RX ADMIN — PREGABALIN 50 MG: 25 CAPSULE ORAL at 21:11

## 2021-11-04 RX ADMIN — SEVELAMER CARBONATE 3200 MG: 800 TABLET, FILM COATED ORAL at 10:44

## 2021-11-04 RX ADMIN — ACETAMINOPHEN 650 MG: 325 TABLET ORAL at 04:07

## 2021-11-04 RX ADMIN — OXYCODONE HYDROCHLORIDE 10 MG: 10 TABLET ORAL at 10:44

## 2021-11-04 RX ADMIN — LACOSAMIDE 100 MG: 100 TABLET, FILM COATED ORAL at 04:07

## 2021-11-04 RX ADMIN — DOCUSATE SODIUM 50 MG AND SENNOSIDES 8.6 MG 2 TABLET: 8.6; 5 TABLET, FILM COATED ORAL at 17:32

## 2021-11-04 ASSESSMENT — PAIN DESCRIPTION - PAIN TYPE
TYPE: CHRONIC PAIN
TYPE: ACUTE PAIN

## 2021-11-04 NOTE — PROGRESS NOTES
[facility] [place] FAMILY MEDICINE PROGRESS NOTE        Attending:   Dr. Calvillo    Resident:   Gerson Chawla M.D.    PATIENT:   Debby Carrizales; [id number]; 1980    ID:   41 y.o. female with history of SLE, CKD on PD and seizures on lacosamide, who was admitted for prolonged postictal state/altered mental status.     SUBJECTIVE:   No acute events overnight.  The patient has markedly improved today, and is AOx4.  She says that she has had seizures in the past that led to this same sort of presentation, and that she has not been taking her seizure medications recently.  Additionally, when asked about her other medical conditions she says that she follows Dr. Collado for her SLE, and also follows PM and R for chronic pain.  She says that she has a migraine this morning but is otherwise feeling more like herself.  She would like to get some pain medicine for her migraine, but otherwise does not have concerns at this time.    OBJECTIVE:  Vitals:    11/04/21 0356 11/04/21 0700 11/04/21 0800 11/04/21 1200   BP: 158/84 150/79 140/88 136/86   Pulse: 66 77 76 81   Resp: 18 18 16 16   Temp: 36.7 °C (98 °F) 36.7 °C (98.1 °F) 36.4 °C (97.6 °F) 36.9 °C (98.4 °F)   TempSrc: Temporal Temporal Temporal Temporal   SpO2: 94% 95% 96% 95%   Weight:       Height:           Intake/Output Summary (Last 24 hours) at 11/4/2021 1516  Last data filed at 11/4/2021 0700  Gross per 24 hour   Intake --   Output 796 ml   Net -796 ml       PHYSICAL EXAM:  General: No acute distress, afebrile, resting comfortably; makes eye contact and converses with appropriate affect; there is some twitching of the right nasolabial fold during our interview, as well as twitching of the right forearm and right leg  HEENT: NC/AT. EOMI.   Cardiovascular: RRR without murmurs. Normal capillary refill   Respiratory: CTAB  Abdomen: soft, nontender, nondistended, no masses  EXT:  CHESTER, no edema  Skin: No erythema/lesions   Neuro: Non-focal; AAOx4    LABS:  Recent Labs      11/02/21 0316 11/03/21 0110 11/04/21 0210   WBC 4.7* 2.6* 2.7*   RBC 3.28* 3.30* 3.31*   HEMOGLOBIN 10.5* 10.4* 10.5*   HEMATOCRIT 31.0* 31.4* 30.9*   MCV 94.5 95.2 93.4   MCH 32.0 31.5 31.7   RDW 38.8 38.5 37.8   PLATELETCT 99* 98* 99*   MPV 10.6 10.6 10.4   NEUTSPOLYS  --   --  46.30   LYMPHOCYTES  --   --  35.60   MONOCYTES  --   --  13.90*   EOSINOPHILS  --   --  1.90   BASOPHILS  --   --  1.90*     Recent Labs     11/02/21 0316 11/03/21 0110 11/03/21 0921 11/04/21 0210 11/04/21  1114   SODIUM   < >  --  140 140 139   POTASSIUM   < >  --  4.7 4.4 4.8   CHLORIDE   < >  --  98 97 97   CO2   < >  --  26 27 27   BUN   < >  --  26* 46* 46*   CREATININE   < >  --  14.65* 13.59* 13.54*   CALCIUM   < >  --  8.8 8.9 8.7   MAGNESIUM  --  2.6*  --  2.4  --    PHOSPHORUS  --   --  5.9* 6.2* 6.4*   ALBUMIN   < >  --  3.5 3.5 3.4    < > = values in this interval not displayed.     Estimated GFR/CRCL = Estimated Creatinine Clearance: 5.9 mL/min (A) (by C-G formula based on SCr of 13.54 mg/dL ()).  Recent Labs     11/01/21 2337 11/02/21 0011 11/02/21 0316 11/03/21 0921 11/04/21 0210 11/04/21  1114   GLUCOSE  --   --    < > 90 129* 94   POCGLUCOSE 78 156*  --   --   --   --     < > = values in this interval not displayed.     Recent Labs     11/01/21 2012 11/02/21 0316 11/04/21 0210   ASTSGOT  --  8* 9*   ALTSGPT  --  10 6   TBILIRUBIN  --  0.3 0.3   ALKPHOSPHAT  --  145* 138*   GLOBULIN  --  2.6 2.6   AMMONIA 19  --   --                  IMAGING:  CT-HEAD W/O   Final Result         NO ACUTE ABNORMALITIES ARE NOTED ON CT SCAN OF THE HEAD.             MEDS:  Current Facility-Administered Medications   Medication Last Admin   • oxyCODONE immediate-release (ROXICODONE) tablet 5 mg      Or   • oxyCODONE immediate release (ROXICODONE) tablet 10 mg 10 mg at 11/04/21 1044   • hydrALAZINE (APRESOLINE) injection 20 mg 20 mg at 11/03/21 0030   • amLODIPine (NORVASC) tablet 10 mg 10 mg at 11/04/21 0407   • escitalopram  (Lexapro) tablet 10 mg 10 mg at 11/04/21 0407   • lacosamide (VIMPAT) tablet 100 mg 100 mg at 11/04/21 0407   • lisinopril (PRINIVIL) tablet 20 mg 20 mg at 11/04/21 0407   • pregabalin (LYRICA) capsule 50 mg 50 mg at 11/04/21 1044   • acetaminophen (Tylenol) tablet 650 mg 650 mg at 11/04/21 0407   • ondansetron (ZOFRAN) syringe/vial injection 4 mg 4 mg at 11/02/21 1050   • ondansetron (ZOFRAN ODT) dispertab 4 mg 4 mg at 11/04/21 0407   • promethazine (PHENERGAN) tablet 12.5-25 mg     • promethazine (PHENERGAN) suppository 12.5-25 mg     • prochlorperazine (COMPAZINE) injection 5-10 mg     • senna-docusate (PERICOLACE or SENOKOT S) 8.6-50 MG per tablet 2 Tablet 2 Tablet at 11/04/21 0407    And   • polyethylene glycol/lytes (MIRALAX) PACKET 1 Packet      And   • bisacodyl (DULCOLAX) suppository 10 mg     • sevelamer carbonate (RENVELA) tablet 3,200 mg 3,200 mg at 11/04/21 1044   • sevelamer carbonate (RENVELA) tablet 1,600 mg         ASSESSMENT/PLAN:  This is a pleasant 41 y.o. female with history of SLE, CKD on PD and seizures on lacosamide, who was admitted for prolonged postictal state/altered mental status.    Seizure disorder  Acute encephalopathy  -Patient had prolonged postictal state, and so was admitted rather than discharged from the ED  -In the setting of SLE induced CKD, this acute encephalopathy could have been of uremic origin, or even could have been the cause of this patient's seizure; however, it is more likely a result of the patient not taking her antiseizure medication  -Patient was loaded on her antiseizure medication by neurology  -Neurology has decided that they will follow-up outpatient, as the patient has markedly improved; recommendations appreciated  -Continuing outpatient dose lacosamide    ESRD  Hyperkalemia  -Patient is continuing sevelamer  -Patient continues to receive peritoneal dialysis  -Nephrology following; recommendations appreciated  -Renal diet  -Renally dose  medications  -Vascular surgery pending for dialysis access?  -Continue to monitor with daily labs    SLE  Lymphopenia  -Patient continues to have low white blood cell counts  -Likely related to SLE  -This could have been documented back as far as 2015 per chart review  -Per patient, is following up with Dr. Collado for SLE  -Continue to monitor with daily labs    Chronic pain  -Patient takes pregabalin and oxycodone outpatient  -Patient follows PM and R, and per notes patient is trying to decrease opiate use  -While inpatient, holding pregabalin, and administering lower dose of oxycodone    Hypertension  -Continuing amlodipine, lisinopril outpatient doses  -As needed antihypertensive medications    Core Measures:   Fluids: None  Lines: PIV  Abx: None  DVT prophylaxis: SCDs  Code Status: Full    Disposition: Inpatient for peritoneal dialysis and possible consult with vascular surgery    Gerson Chawla MD   PGY-2 Family Medicine Resident   [facility]

## 2021-11-04 NOTE — PROGRESS NOTES
Bedside report received from day RN, pt care assumed, assessment completed. Pt is A&O2, pain 0, SR on the monitor. Updated on POC, questions answered. Bed in lowest, locked position, treaded socks on, call light and belongings within reach. Fall precautions in place.

## 2021-11-04 NOTE — CARE PLAN
The patient is Stable - Low risk of patient condition declining or worsening    Shift Goals  Clinical Goals: PD, monitor mentation  Patient Goals: rest    Progress made toward(s) clinical / shift goals:  Maintain comfort level    Patient is not progressing towards the following goals:      Problem: Knowledge Deficit - Standard  Goal: Patient and family/care givers will demonstrate understanding of plan of care, disease process/condition, diagnostic tests and medications  Outcome: Progressing     Problem: Fall Risk  Goal: Patient will remain free from falls  Outcome: Progressing     Problem: Pain - Standard  Goal: Alleviation of pain or a reduction in pain to the patient’s comfort goal  Outcome: Progressing

## 2021-11-04 NOTE — CARE PLAN
The patient is Watcher - Medium risk of patient condition declining or worsening    Shift Goals  Clinical Goals: PD, monitor mentation  Patient Goals: rest    Progress made toward(s) clinical / shift goals:  Progressing     Problem: Knowledge Deficit - Standard  Goal: Patient and family/care givers will demonstrate understanding of plan of care, disease process/condition, diagnostic tests and medications  Outcome: Progressing  Note: Pt updated on POC. No questions at this time      Problem: Fall Risk  Goal: Patient will remain free from falls  Outcome: Progressing  Note: All fall precautions in place.

## 2021-11-04 NOTE — PROGRESS NOTES
[facility] Progress Note     CCPD ordered by Dr. Shaver x 10 hours using 2.5 % PD solution.      Aseptically connected PD cycler to PD catheter at 1750   Exit site cleansed, dressing changed CDI; no s/s infection noted.  Mild abdominal cramping during initial drain improved during fill 1 of 4  Patient had HD tunneled catheter as well; dressing changed aseptically;   informed Dr. Shaver to re-evaluate if still needed      Report given to Primary RN GIOVANNA Burleson including troubleshooting;   and on-call Dialysis RN contact information ([phone]).    Please call for any issues with hemodynamic instability/persistent alarms/patient not tolerating therapy.

## 2021-11-04 NOTE — PROGRESS NOTES
Pt is alert, oriented x 4, not in any signs of distress at this time. Vitals taken. PD catheter with clean, dry and intact dressing. No signs of infection at this time. PD disconnected aseptically per Jannette P&P. New cap attached. Pt complained of back pain with scale of 8/10. Primary RN aware.   Net UF: 796 mL (Total UF : 1,104 ml + I-Drain : 1,692 ml - Last Fill : 2,000 ml )

## 2021-11-04 NOTE — PROGRESS NOTES
I personally examined the patient and discussed the patient's care with the resident team. I have reviewed all pertinent imaging, labs, medications, orders, notes, and consultant recommendations. I agree with Dr. Chawla's assessment and treatment plans.

## 2021-11-05 ENCOUNTER — PATIENT OUTREACH (OUTPATIENT)
Dept: HEALTH INFORMATION MANAGEMENT | Facility: OTHER | Age: 41
End: 2021-11-05

## 2021-11-05 VITALS
BODY MASS INDEX: 30.23 KG/M2 | WEIGHT: 181.44 LBS | TEMPERATURE: 98 F | HEART RATE: 75 BPM | HEIGHT: 65 IN | OXYGEN SATURATION: 96 % | SYSTOLIC BLOOD PRESSURE: 135 MMHG | RESPIRATION RATE: 84 BRPM | DIASTOLIC BLOOD PRESSURE: 86 MMHG

## 2021-11-05 LAB
ALBUMIN SERPL BCP-MCNC: 3.1 G/DL (ref 3.2–4.9)
ALBUMIN/GLOB SERPL: 1.1 G/DL
ALP SERPL-CCNC: 128 U/L (ref 30–99)
ALT SERPL-CCNC: 5 U/L (ref 2–50)
ANION GAP SERPL CALC-SCNC: 17 MMOL/L (ref 7–16)
AST SERPL-CCNC: 8 U/L (ref 12–45)
BASOPHILS # BLD AUTO: 2.9 % (ref 0–1.8)
BASOPHILS # BLD: 0.08 K/UL (ref 0–0.12)
BILIRUB SERPL-MCNC: 0.3 MG/DL (ref 0.1–1.5)
BUN SERPL-MCNC: 44 MG/DL (ref 8–22)
CALCIUM SERPL-MCNC: 8.7 MG/DL (ref 8.5–10.5)
CHLORIDE SERPL-SCNC: 99 MMOL/L (ref 96–112)
CO2 SERPL-SCNC: 25 MMOL/L (ref 20–33)
CREAT SERPL-MCNC: 13.29 MG/DL (ref 0.5–1.4)
EOSINOPHIL # BLD AUTO: 0.09 K/UL (ref 0–0.51)
EOSINOPHIL NFR BLD: 3.3 % (ref 0–6.9)
ERYTHROCYTE [DISTWIDTH] IN BLOOD BY AUTOMATED COUNT: 39.5 FL (ref 35.9–50)
GLOBULIN SER CALC-MCNC: 2.8 G/DL (ref 1.9–3.5)
GLUCOSE SERPL-MCNC: 117 MG/DL (ref 65–99)
HCT VFR BLD AUTO: 32.5 % (ref 37–47)
HGB BLD-MCNC: 10.4 G/DL (ref 12–16)
IMM GRANULOCYTES # BLD AUTO: 0.01 K/UL (ref 0–0.11)
IMM GRANULOCYTES NFR BLD AUTO: 0.4 % (ref 0–0.9)
LYMPHOCYTES # BLD AUTO: 1.26 K/UL (ref 1–4.8)
LYMPHOCYTES NFR BLD: 46.3 % (ref 22–41)
MCH RBC QN AUTO: 30.9 PG (ref 27–33)
MCHC RBC AUTO-ENTMCNC: 32 G/DL (ref 33.6–35)
MCV RBC AUTO: 96.4 FL (ref 81.4–97.8)
MONOCYTES # BLD AUTO: 0.37 K/UL (ref 0–0.85)
MONOCYTES NFR BLD AUTO: 13.6 % (ref 0–13.4)
NEUTROPHILS # BLD AUTO: 0.91 K/UL (ref 2–7.15)
NEUTROPHILS NFR BLD: 33.5 % (ref 44–72)
NRBC # BLD AUTO: 0 K/UL
NRBC BLD-RTO: 0 /100 WBC
PHOSPHATE SERPL-MCNC: 7.3 MG/DL (ref 2.5–4.5)
PLATELET # BLD AUTO: 94 K/UL (ref 164–446)
PMV BLD AUTO: 10.6 FL (ref 9–12.9)
POTASSIUM SERPL-SCNC: 4.2 MMOL/L (ref 3.6–5.5)
PROT SERPL-MCNC: 5.9 G/DL (ref 6–8.2)
RBC # BLD AUTO: 3.37 M/UL (ref 4.2–5.4)
SODIUM SERPL-SCNC: 141 MMOL/L (ref 135–145)
WBC # BLD AUTO: 2.7 K/UL (ref 4.8–10.8)

## 2021-11-05 PROCEDURE — 84100 ASSAY OF PHOSPHORUS: CPT

## 2021-11-05 PROCEDURE — A9270 NON-COVERED ITEM OR SERVICE: HCPCS | Performed by: HOSPITALIST

## 2021-11-05 PROCEDURE — 90686 IIV4 VACC NO PRSV 0.5 ML IM: CPT | Performed by: FAMILY MEDICINE

## 2021-11-05 PROCEDURE — 90945 DIALYSIS ONE EVALUATION: CPT

## 2021-11-05 PROCEDURE — 90471 IMMUNIZATION ADMIN: CPT

## 2021-11-05 PROCEDURE — 700102 HCHG RX REV CODE 250 W/ 637 OVERRIDE(OP): Performed by: HOSPITALIST

## 2021-11-05 PROCEDURE — 700111 HCHG RX REV CODE 636 W/ 250 OVERRIDE (IP): Performed by: FAMILY MEDICINE

## 2021-11-05 PROCEDURE — A9270 NON-COVERED ITEM OR SERVICE: HCPCS | Performed by: STUDENT IN AN ORGANIZED HEALTH CARE EDUCATION/TRAINING PROGRAM

## 2021-11-05 PROCEDURE — 90945 DIALYSIS ONE EVALUATION: CPT | Performed by: INTERNAL MEDICINE

## 2021-11-05 PROCEDURE — 80053 COMPREHEN METABOLIC PANEL: CPT

## 2021-11-05 PROCEDURE — 85025 COMPLETE CBC W/AUTO DIFF WBC: CPT

## 2021-11-05 PROCEDURE — 700102 HCHG RX REV CODE 250 W/ 637 OVERRIDE(OP): Performed by: STUDENT IN AN ORGANIZED HEALTH CARE EDUCATION/TRAINING PROGRAM

## 2021-11-05 PROCEDURE — 99238 HOSP IP/OBS DSCHRG MGMT 30/<: CPT | Mod: GC | Performed by: FAMILY MEDICINE

## 2021-11-05 PROCEDURE — 36415 COLL VENOUS BLD VENIPUNCTURE: CPT

## 2021-11-05 RX ADMIN — PROMETHAZINE HYDROCHLORIDE 25 MG: 25 TABLET ORAL at 06:19

## 2021-11-05 RX ADMIN — LISINOPRIL 20 MG: 20 TABLET ORAL at 05:22

## 2021-11-05 RX ADMIN — OXYCODONE HYDROCHLORIDE 15 MG: 10 TABLET ORAL at 12:25

## 2021-11-05 RX ADMIN — SEVELAMER CARBONATE 3200 MG: 800 TABLET, FILM COATED ORAL at 08:42

## 2021-11-05 RX ADMIN — LACOSAMIDE 100 MG: 100 TABLET, FILM COATED ORAL at 05:22

## 2021-11-05 RX ADMIN — OXYCODONE HYDROCHLORIDE 15 MG: 10 TABLET ORAL at 06:08

## 2021-11-05 RX ADMIN — PREGABALIN 50 MG: 25 CAPSULE ORAL at 08:42

## 2021-11-05 RX ADMIN — AMLODIPINE BESYLATE 10 MG: 10 TABLET ORAL at 05:22

## 2021-11-05 RX ADMIN — INFLUENZA A VIRUS A/VICTORIA/2570/2019 IVR-215 (H1N1) ANTIGEN (FORMALDEHYDE INACTIVATED), INFLUENZA A VIRUS A/TASMANIA/503/2020 IVR-221 (H3N2) ANTIGEN (FORMALDEHYDE INACTIVATED), INFLUENZA B VIRUS B/PHUKET/3073/2013 ANTIGEN (FORMALDEHYDE INACTIVATED), AND INFLUENZA B VIRUS B/WASHINGTON/02/2019 ANTIGEN (FORMALDEHYDE INACTIVATED) 0.5 ML: 15; 15; 15; 15 INJECTION, SUSPENSION INTRAMUSCULAR at 15:28

## 2021-11-05 RX ADMIN — ESCITALOPRAM OXALATE 10 MG: 10 TABLET ORAL at 05:22

## 2021-11-05 RX ADMIN — PROMETHAZINE HYDROCHLORIDE 25 MG: 25 TABLET ORAL at 00:01

## 2021-11-05 RX ADMIN — OXYCODONE HYDROCHLORIDE 15 MG: 10 TABLET ORAL at 00:01

## 2021-11-05 RX ADMIN — SEVELAMER CARBONATE 3200 MG: 800 TABLET, FILM COATED ORAL at 11:57

## 2021-11-05 ASSESSMENT — PAIN DESCRIPTION - PAIN TYPE
TYPE: CHRONIC PAIN

## 2021-11-05 ASSESSMENT — COGNITIVE AND FUNCTIONAL STATUS - GENERAL
DAILY ACTIVITIY SCORE: 21
DRESSING REGULAR LOWER BODY CLOTHING: A LITTLE
DRESSING REGULAR UPPER BODY CLOTHING: A LITTLE
TOILETING: A LITTLE
MOBILITY SCORE: 18
WALKING IN HOSPITAL ROOM: A LITTLE
SUGGESTED CMS G CODE MODIFIER DAILY ACTIVITY: CJ
CLIMB 3 TO 5 STEPS WITH RAILING: A LITTLE
MOVING FROM LYING ON BACK TO SITTING ON SIDE OF FLAT BED: A LITTLE
STANDING UP FROM CHAIR USING ARMS: A LITTLE
MOVING TO AND FROM BED TO CHAIR: A LITTLE
SUGGESTED CMS G CODE MODIFIER MOBILITY: CK
TURNING FROM BACK TO SIDE WHILE IN FLAT BAD: A LITTLE

## 2021-11-05 NOTE — PROGRESS NOTES
Pt discharged home with family; assisted off the unit by hospital staff via WC. Pt verbally acknowledges all discharge instructions, medications, and medications regimen. Pt gathered all personal belongings, Tele box and IV have been removed. All questions and needs have been met at this time.

## 2021-11-05 NOTE — PROGRESS NOTES
12 hour chart check    Monitor Summary  Rhythm: SR/ST  Rate:   Ectopy: -  .19 / .08 / .43

## 2021-11-05 NOTE — DISCHARGE INSTRUCTIONS
Discharge Instructions    Discharged to home by taxi with self. Discharged via walking, hospital escort: Yes.  Special equipment needed: Not Applicable    Be sure to schedule a follow-up appointment with your primary care doctor or any specialists as instructed.     Discharge Plan:   Diet Plan: Discussed  Activity Level: Discussed  Confirmed Follow up Appointment: Appointment Scheduled  Confirmed Symptoms Management: Discussed  Medication Reconciliation Updated: Yes  Influenza Vaccine Indication: Indicated: 9 to 64 years of age    I understand that a diet low in cholesterol, fat, and sodium is recommended for good health. Unless I have been given specific instructions below for another diet, I accept this instruction as my diet prescription.   Other diet: Regular as tolerated    Special Instructions: None    · Is patient discharged on Warfarin / Coumadin?   No     Depression / Suicide Risk    As you are discharged from this [facility] facility, it is important to learn how to keep safe from harming yourself.    Recognize the warning signs:  · Abrupt changes in personality, positive or negative- including increase in energy   · Giving away possessions  · Change in eating patterns- significant weight changes-  positive or negative  · Change in sleeping patterns- unable to sleep or sleeping all the time   · Unwillingness or inability to communicate  · Depression  · Unusual sadness, discouragement and loneliness  · Talk of wanting to die  · Neglect of personal appearance   · Rebelliousness- reckless behavior  · Withdrawal from people/activities they love  · Confusion- inability to concentrate     If you or a loved one observes any of these behaviors or has concerns about self-harm, here's what you can do:  · Talk about it- your feelings and reasons for harming yourself  · Remove any means that you might use to hurt yourself (examples: pills, rope, extension cords, firearm)  · Get professional help from the community  (Mental Health, Substance Abuse, psychological counseling)  · Do not be alone:Call your Safe Contact- someone whom you trust who will be there for you.  · Call your local CRISIS HOTLINE 508-4459 or 585-318-7672  · Call your local Children's Mobile Crisis Response Team [place] (593) 878-4845 or www.ChannelAdvisor  · Call the toll free National Suicide Prevention Hotlines   · National Suicide Prevention Lifeline 205-687-FPZA (2434)  · [employer] Hope Line Network 800-SUICIDE (642-9433)      Peritoneal Dialysis Information    Peritoneal dialysis is a procedure that filters your blood. You may have this procedure if your kidneys are not working well. You can perform peritoneal dialysis yourself, or a machine can do it for you at night when you sleep.   Tell a health care provider about:  · Any allergies you have.  · All medicines you are taking, including vitamins, herbs, eye drops, creams, and over-the-counter medicines.  · Any problems you or family members have had with anesthetic medicines.  · Any blood disorders you have.  · Any surgeries you have.  · Any medical conditions you have.  · Whether you are pregnant or may be pregnant.  What are the risks?  Generally, peritoneal dialysis is safe. However, problems may occur, including:  · Infection in the lining of your abdomen (peritoneum). This is the most common problem.  · Infection in the area where the catheter was inserted.  · Discomfort in the area where the catheter was inserted.  · Weakened abdominal muscles. This may lead to a hernia, which can cause problems if left untreated.  What happens before treatment?  It is important to safely prepare for treatment and take steps to prevent infection. Your health care provider will teach you how to prepare for a dialysis session. Preparation may involve:  · Closing doors and windows in the room where dialysis will be performed.  · Making sure to wash your hands before and during treatment. Anyone that touches  you or the equipment should also wash his or her hands often.  · Putting on a mask.  · Making sure that tubing and equipment are germ-free (sterile).  · Checking the bag of fluid (dialysate) you will use during the session, to make sure it is sealed and free of germs (uncontaminated).  What happens during treatment?  At the start of a session, your abdomen is filled with dialysate. The dialysate pulls waste, salt, and extra water through the peritoneum. At the end of the session, the dialysate, plus all the waste it pulled from your blood, is drained from your body.  There are two kinds of peritoneal dialysis:  · Continuous cycling peritoneal dialysis (CCPD). In this type, a machine called a cycler fills and drains your abdomen (performs exchanges) for you while you sleep.  · Continuous ambulatory peritoneal dialysis (CAPD). In this type, you perform exchanges for yourself up to 5 times a day. Each exchange takes about 30-40 minutes. The amount of time the dialysate stays in your body (the dwell) usually varies from 1.5-3 hours. You may go about your day normally between exchanges.  Some people need to have a combination of CAPD and CCPD.  What can I expect after treatment?  · You may need to have lab work or other tests done to check on how well the dialysis is working.  · Change the bandage (dressing) around your permanent catheter as directed by your health care provider. Keep the dressing clean and dry.  · Weigh yourself after the treatment and write down your weight.  Follow these instructions at home:  Eating and drinking  · Follow your health care provider's instructions about diet. You should follow a diet plan that includes:  ? Nutritional counseling with a dietitian.  ? Vitamin supplements.  ? High-quality proteins, such as meat, poultry, fish, and eggs. Most people on peritoneal dialysis need to eat a high-protein diet because protein is lost during the dialysis exchange.  Preventing constipation  · Avoid  becoming constipated. Constipation prevents dialysate from draining well. To prevent constipation:  ? Eat foods that are high in fiber, such as fresh fruits and vegetables, whole grains, and beans.  ? Limit foods that are high in fat and processed sugars, such as fried and sweet foods.  ? Be active.  ? Go to the restroom when you feel that you need to. Do not hold it in.  ? Take over-the-counter or prescription medicines such as laxatives only if your health care provider recommends them.  General instructions  · Keep a strict schedule. Dialysis must be done every day. Do not skip a day or an exchange. Make time for each exchange.  · Always keep the dialysate bags and other supplies in a cool, clean, and dry place.  · Take over-the-counter and prescription medicines only as told by your health care provider.  · Weigh yourself every day. Sudden weight gain may be a sign of a problem.  · Keep all follow-up visits as told by your health care provider. This is important.  Where to find more information  You can find more information about peritoneal dialysis treatment from:  · National Instititute of Diabetes and Digestive and Kidney Diseases: www.niddk.nih.gov/health-information/kidney-disease/kidney-failure/peritoneal-dialysis  · National Kidney Foundation: www.kidney.org/atoz/content/peritoneal  Contact a health care provider if:  · You have a fever or chills.  · You feel nauseous or you vomit.  · You have diarrhea.  · You have any problems with an exchange.  · Your blood pressure increases.  · You suddenly gain weight or feel short of breath.  · The catheter seems loose or feels like it is coming out.  · The fluid that has drained from your abdomen is pinkish or reddish. Women having their menstrual period do not need to seek medical care if the fluid is only a little pink or red.  · There are white strands in the dialysate that are large enough to get stuck in your tubing or catheter.  Get help right away  if:  · The area around the catheter swells or becomes red, tender, or painful.  · There is pus coming from the catheter area.  · The fluid that has drained from your abdomen is cloudy.  · You feel more abdominal pain or discomfort.  Summary  · Peritoneal dialysis is a procedure that filters your blood. You may have this procedure if your kidneys are not working well.  · CAPD and CCPD are the two kinds of peritoneal dialysis. Your health care provider will help you decide which kind is best for you.  · The main risks of peritoneal dialysis are infection and weakened abdominal muscles, which may lead to a hernia.  This information is not intended to replace advice given to you by your health care provider. Make sure you discuss any questions you have with your health care provider.  Document Released: 10/15/2010 Document Revised: 04/09/2020 Document Reviewed: 03/29/2018  Elsevier Patient Education © 2020 [employer]      Seizure, Adult  A seizure is a sudden burst of abnormal electrical activity in the brain. Seizures usually last from 30 seconds to 2 minutes. The abnormal activity temporarily interrupts normal brain function. A seizure can cause many different symptoms depending on where in the brain it starts.  What are the causes?  Common causes of this condition include:  · Fever or infection.  · Brain abnormality, injury, bleeding, or tumor.  · Low blood sugar.  · Metabolic disorders or other conditions that are passed from parent to child (are inherited).  · Reaction to a substance, such as a drug or a medicine, or suddenly stopping the use of a substance (withdrawal).  · Stroke.  · Developmental disorders such as autism or cerebral palsy.  In some cases, the cause of this condition may not be known. Some people who have a seizure never have another one. Seizures usually do not cause brain damage or permanent problems unless they are prolonged. A person who has repeated seizures over time without a clear cause  has a condition called epilepsy.  What increases the risk?  You are more likely to develop this condition if you have:  · A family history of epilepsy.  · Had a tonic-clonic seizure in the past. This is a type of seizure that involves whole-body contraction of muscles and a loss of consciousness.  · Autism, cerebral palsy, or other brain disorders.  · A history of head trauma, lack of oxygen at birth, or strokes.  What are the signs or symptoms?  There are many different types of seizures. The symptoms of a seizure vary depending on the type of seizure you have. Examples of symptoms during a seizure include:  · Uncontrollable shaking (convulsions).  · Stiffening of the body.  · Loss of consciousness.  · Head nodding.  · Staring.  · Not responding to sound or touch.  · Loss of bladder or bowel control.  Some people have symptoms right before a seizure happens (aura) and right after a seizure happens (postictal).  Symptoms before a seizure may include:  · Fear or anxiety.  · Nausea.  · Feeling like the room is spinning (vertigo).  · A feeling of having seen or heard something before (déjà vu).  · Odd tastes or smells.  · Changes in vision, such as seeing flashing lights or spots.  Symptoms after a seizure may include:  · Confusion.  · Sleepiness.  · Headache.  · Weakness on one side of the body.  How is this diagnosed?  This condition may be diagnosed based on:  · A description of your symptoms. Video of your seizures can be helpful.  · Your medical history.  · A physical exam.  You may also have tests, including:  · Blood tests.  · CT scan.  · MRI.  · Electroencephalogram (EEG). This test measures electrical activity in the brain. An EEG can predict whether seizures will return (recur).  · A spinal tap (also called a lumbar puncture). This is the removal and testing of fluid that surrounds the brain and spinal cord.  How is this treated?  Most seizures will stop on their own in under 5 minutes, and no treatment is  needed. Seizures that last longer than 5 minutes will usually need treatment. Treatment can include:  · Medicines given through an IV.  · Avoiding known triggers, such as medicines that you take for another condition.  · Medicines to treat epilepsy (antiepileptics), if epilepsy caused your seizures.  · Surgery to stop seizures, if you have epilepsy that does not respond to medicines.  Follow these instructions at home:  Medicines  · Take over-the-counter and prescription medicines only as told by your health care provider.  · Avoid any substances that may prevent your medicine from working properly, such as alcohol.  Activity  · Do not drive, swim, or do any other activities that would be dangerous if you had another seizure. Wait until your health care provider says it is safe to do them.  · If you live in the [place] check with your local DMV (department of [employer] vehicles) to find out about local driving laws. Each state has specific rules about when you can legally return to driving.  · Get enough rest. Lack of sleep can make seizures more likely to occur.  Educating others  Teach friends and family what to do if you have a seizure. They should:  · Lay you on the ground to prevent a fall.  · Cushion your head and body.  · Loosen any tight clothing around your neck.  · Turn you on your side. If vomiting occurs, this helps keep your airway clear.  · Not hold you down. Holding you down will not stop the seizure.  · Not put anything into your mouth.  · Know whether or not you need emergency care. For example, they should get help right away if you have a seizure that lasts longer than 5 minutes or have several seizures in a row.  · Stay with you until you recover.    General instructions  · Contact your health care provider each time you have a seizure.  · Avoid anything that has ever triggered a seizure for you.  · Keep a seizure diary. Record what you remember about each seizure, especially anything that might have  triggered the seizure.  · Keep all follow-up visits as told by your health care provider. This is important.  Contact a health care provider if:  · You have another seizure.  · You have seizures more often.  · Your seizure symptoms change.  · You continue to have seizures with treatment.  · You have symptoms of an infection or illness. This might increase your risk of having a seizure.  Get help right away if:  · You have a seizure that:  ? Lasts longer than 5 minutes.  ? Is different than previous seizures.  ? Leaves you unable to speak or use a part of your body.  ? Makes it harder to breathe.  · You have:  ? A seizure after a head injury.  ? Multiple seizures in a row.  ? Confusion or a severe headache right after a seizure.  · You do not wake up immediately after a seizure.  · You injure yourself during a seizure.  These symptoms may represent a serious problem that is an emergency. Do not wait to see if the symptoms will go away. Get medical help right away. Call your local emergency services (911 in the [place] Do not drive yourself to the hospital.  Summary  · Seizures are caused by abnormal electrical activity in the brain. The activity disrupts normal brain function and can cause various symptoms, such as convulsions, abnormal movements, or a change in consciousness.  · There are many causes of seizures, including illnesses, medicines, genetic conditions, head injuries, strokes, tumors, substance abuse, or substance withdrawal.  · Most seizures will stop on their own in under 5 minutes. Seizures that last longer than 5 minutes are a medical emergency and require immediate treatment.  · Many medicines are used to treat seizures. Take over-the-counter and prescription medicines only as told by your health care provider.  This information is not intended to replace advice given to you by your health care provider. Make sure you discuss any questions you have with your health care provider.  Document Released:  12/15/2001 Document Revised: 03/06/2020 Document Reviewed: 03/06/2020  Elsevier Patient Education © 2020 Else[employer] Inc.      Hyperkalemia  Hyperkalemia occurs when the level of potassium in your blood is too high. Potassium is an important nutrient that helps the muscles and nerves function normally. It affects how the heart works, and it helps keep fluids and minerals balanced in the body. If there is too much potassium in your blood, it can affect your heart's ability to function normally.  Potassium is normally removed (excreted) from the body by the kidneys. Hyperkalemia can result from various conditions. It can range from mild to severe.  What are the causes?  This condition may be caused by:  · Taking in too much potassium. You can do this by:  ? Using salt substitutes. They contain large amounts of potassium.  ? Taking potassium supplements.  ? Eating foods that are high in potassium.  · Excreting too little potassium. This can happen if:  ? Your kidneys are not working properly. Kidney (renal) disease, including short-term or long-term renal failure, is a common cause of hyperkalemia.  ? You are taking medicines that lower your excretion of potassium.  ? You have Ventura's disease.  ? You have a urinary tract blockage, such as kidney stones.  ? You are on treatment to mechanically clean your blood (dialysis) and you skip a treatment.  · Releasing a high amount of potassium from your cells into your blood. This can happen with:  ? Injury to muscles (rhabdomyolysis) or other tissues. Most potassium is stored in your muscles.  ? Severe burns or infections.  ? Acidic blood plasma (acidosis). Acidosis can result from many diseases, such as uncontrolled diabetes.  What increases the risk?  The following factors may make you more likely to develop this condition:  · Kidney disease. This puts you at the highest risk.  · Ventura's disease. This is a condition where the adrenal glands do not produce enough  hormones.  · Alcoholism or heavy drug use.  · Using certain blood pressure medicines, such as ACE inhibitors, angiotensin II receptor blockers (ARBs), or potassium-sparing diuretics such as spironolactone.  · Severe injury or burn.  What are the signs or symptoms?  In many cases, there are no symptoms. However, when your potassium level becomes high enough, you may have symptoms such as:  · An irregular or very slow heartbeat.  · Nausea.  · Tiredness (fatigue).  · Confusion.  · Tingling of your skin or numbness of your hands or feet.  · Muscle cramps.  · Muscle weakness.  · Not being able to move (paralysis).  How is this diagnosed?  This condition may be diagnosed based on:  · Your symptoms and medical history. Your health care provider will ask about your use of prescription and non-prescription drugs.  · A physical exam.  · Blood tests.  · An electrocardiogram (ECG).  How is this treated?  Treatment depends on the cause and severity of your condition. Treatment may need to be done in the hospital setting. Treatment may include:  · IV glucose (sugar) along with insulin to shift potassium out of your blood and into your cells.  · A medicine called albuterol to shift potassium out of your blood and into your cells.  · Medicines to remove the potassium from your body.  · Dialysis to remove the potassium from your body.  · Calcium to protect your heart from the effects of high potassium, such as irregular rhythms (arrhythmias).  Follow these instructions at home:    · Take over-the-counter and prescription medicines only as told by your health care provider.  · Do not take any supplements, natural products, herbs, or vitamins without reviewing them with your health care provider. Certain supplements and natural food products contain high amounts of potassium.  · Limit your alcohol intake as told by your health care provider.  · Do not use drugs. If you need help quitting, ask your health care provider.  · If you have  kidney disease, you may need to follow a low-potassium diet. A dietitian can help you learn which foods have high or low amounts of potassium.  · Keep all follow-up visits as told by your health care provider. This is important.  Contact a health care provider if you:  · Have an irregular or very slow heartbeat.  · Feel light-headed.  · Feel weak.  · Are nauseous.  · Have tingling or numbness in your hands or feet.  Get help right away if you:  · Have shortness of breath.  · Have chest pain or discomfort.  · Pass out.  · Have muscle paralysis.  Summary  · Hyperkalemia occurs when the level of potassium in your blood is too high.  · This condition may be caused by taking in too much potassium, excreting too little potassium, or releasing a high amount of potassium from your cells into your blood.  · Hyperkalemia can result from many underlying conditions, especially chronic kidney disease, or from taking certain medicines.  · Treatment of hyperkalemia may include medicine to shift potassium out of your blood and into your cells or to remove the potassium from your body.  · If you have kidney disease, you may need to follow a low-potassium diet. A dietitian can help you learn which foods have high or low amounts of potassium.  This information is not intended to replace advice given to you by your health care provider. Make sure you discuss any questions you have with your health care provider.  Document Released: 12/08/2003 Document Revised: 12/03/2018 Document Reviewed: 12/03/2018  Elsevier Patient Education © 2020 Else[employer] Inc.

## 2021-11-05 NOTE — PROGRESS NOTES
[facility] Progress Note     Received patient awake, sitting up in bed. A & O x 4, Vital signs stable overnight.     Tolerated treatment  Effluent clear and yellow  Aseptically disconnected from the PD cycler  Secured PD catheter to patient.  Dressing CDI.     Net UF 1005 ml (1203ml Total UF + 1802ml Initial drain - 2000 Last Fill)    Report given to PETR Galvez RN.

## 2021-11-05 NOTE — DISCHARGE SUMMARY
PATIENT SUMMARY     Admit Date:  11/1/2021       Discharge Date:   11/05/21    Service:   [facility] Team  Attending Physician(s):   Dr. Calvillo       Senior Resident(s):   Dr. Chawla  [place] Resident(s):   Dr. Ashley      Primary Diagnosis:   Hyperkalemia in the setting of ESRD    Secondary Diagnoses:                Seizure disorder  Acute encephalopathy  SLE  Lymphopenia  Lupus nephritis, ESRD  HTN    Hospital Summary (Brief Narrative):       Ms. Carrizales is a 41-year-old female who presented to the emergency department after having apparent seizure-like activity at her peritoneal dialysis appointment on 11/1 and noted that she had not been compliant with Vimpat for the past few days.  Upon arrival to the ED the patient was noted to have a prolonged postictal state and was found to have an elevated serum potassium of 6.1 initially and then 6.8 on subsequent draw.  Nephrology and neurology were consulted.  Patient underwent daily peritoneal dialysis with improvement of her hyperkalemia.  Neurology cleared her with planned follow-up outpatient.    Patient /Hospital Summary (Details -- Problem Oriented) :        Seizure disorder  Acute encephalopathy  - Patient had prolonged postictal state, and so was admitted rather than discharged from the ED  - In the setting of SLE induced CKD, this acute encephalopathy of unclear etiology, metabolic versus neurologic versus hypertensive.  - Patient was loaded on Vimpat by neurology  - The patient has markedly improved with dialysis and time, neurology has cleared her with plan for close follow-up.   - Continuing outpatient Vimpat     ESRD  Hyperkalemia  -Nephrology followed during admission and spoke with vascular surgery due to patient's planned appointment for vascular access.  Will address this outpatient with Dr. Berg.   - Hyperkalemia resolved with daily peritoneal dialysis   - Continue renal diet, nephro protection with renally dosed medications  - Continue  sevelamer and outpatient peritoneal dialysis.     SLE  Lymphopenia  - Patient continues to have low white blood cell counts  - Likely related to SLE  - This could have been documented back as far as 2015 per chart review  - Per patient, is following up with Dr. Collado for SLE     Chronic pain  - Patient takes pregabalin and oxycodone outpatient  - Patient follows PM and R, and per notes patient is trying to decrease opiate use     Hypertension  - Continuing amlodipine, lisinopril outpatient doses    Consultants:      Nephrology, Neurology    Procedures:        Peritoneal dialysis     Imaging/ Testing:      CT-HEAD W/O   Final Result         NO ACUTE ABNORMALITIES ARE NOTED ON CT SCAN OF THE HEAD.              Discharge Medications:           Medication List      CHANGE how you take these medications      Instructions   Oxycodone HCl 20 MG Tabs  What changed: Another medication with the same name was removed. Continue taking this medication, and follow the directions you see here.   Doctor's comments: Diagnosis Association: Peripheral polyneuropathy (G62.9); Avascular necrosis of bone of hip, left (HCC) (M87.052); Chronic bilateral low back pain without sciatica (M54.5 , G89.29)  Take 1 Tablet by mouth 4 times a day as needed (pain) for up to 28 days.  Dose: 1 Tablet        CONTINUE taking these medications      Instructions   amLODIPine 10 MG Tabs  Commonly known as: NORVASC   Take 1 tablet by mouth every day.  Dose: 10 mg     escitalopram 10 MG Tabs  Commonly known as: Lexapro   Take 10 mg by mouth every day.  Dose: 10 mg     lactulose 10 GM/15ML Soln   Take 20 g by mouth 1 time a day as needed (Constipation). 30 mL = 20 mg.  Dose: 20 g     lisinopril 20 MG Tabs  Commonly known as: PRINIVIL   Take 20 mg by mouth every day.  Dose: 20 mg     Narcan 4 MG/0.1ML Liqd  Generic drug: Naloxone   Doctor's comments: Chronic opioid use  Administer 4 mg into affected nostril(S) one time as needed (for severe sleepiness or  difficulty breathing due to opioid overdose) for up to 1 dose.  Dose: 1 Spray     pregabalin 50 MG capsule  Commonly known as: LYRICA   Take 1 Capsule by mouth 2 times a day for 28 days.  Dose: 50 mg     Renvela 800 MG Tabs tablet  Generic drug: sevelamer carbonate   Take 1,600-3,200 mg by mouth see administration instructions. Take 4 tablets (3,200 mg) 3 times per day with meals and 2 tablets (1,600 mg) with snacks.  Dose: 1,600-3,200 mg     tizanidine 4 MG Tabs  Commonly known as: ZANAFLEX   TAKE 2 TABLETS BY MOUTH AT BEDTIME AS NEEDED FOR MUSCLE SPASMS     traZODone 50 MG Tabs  Commonly known as: DESYREL   TAKE 1 TABLET BY MOUTH EVERY NIGHT AT BEDTIME     Vimpat 100 MG Tabs tablet  Generic drug: lacosamide   Take 100 mg by mouth 2 Times a Day.  Dose: 100 mg        STOP taking these medications    promethazine 25 MG Tabs  Commonly known as: PHENERGAN            Disposition:   Discharge home with close follow up with neurology, nephrology, vascular surgery    Diet:   Renal    Activity:   As tolerated    Instructions:         The patient was instructed to return to the ER in the event of worsening symptoms. I have counseled the patient on the importance of compliance and the patient has agreed to proceed with all medical recommendations and follow up plan indicated above.   The patient understands that all medications come with benefits and risks. Risks may include permanent injury or death and these risks can be minimized with close reassessment and monitoring.        Primary Care Provider:      Discharge summary faxed to primary care provider    Follow up appointment details :      Dr Manzano within 1 week  Epilepsy center per neurology recommendation   Nephrology and vascular surgery to address vascular access    Time spent on discharge day patient visit, preparing discharge paperwork and arranging for patient follow up.  Greater than 30 minutes    Dr. Urban Ashley  PGY1

## 2021-11-05 NOTE — CARE PLAN
The patient is Watcher - Medium risk of patient condition declining or worsening    Shift Goals  Clinical Goals: PD, pain management, monitor mental status  Patient Goals: pain management, rest    Progress made toward(s) clinical / shift goals:      Patient communicates pain on 0-10 scale and prn pain medication administered per order. Patient is able to rest comfortably.      Problem: Knowledge Deficit - Standard  Goal: Patient and family/care givers will demonstrate understanding of plan of care, disease process/condition, diagnostic tests and medications  Outcome: Progressing     Problem: Fall Risk  Goal: Patient will remain free from falls  Outcome: Progressing     Problem: Pain - Standard  Goal: Alleviation of pain or a reduction in pain to the patient’s comfort goal  Outcome: Progressing       Patient is not progressing towards the following goals:

## 2021-11-05 NOTE — CARE PLAN
The patient is Stable - Low risk of patient condition declining or worsening    Shift Goals: Patient will increase activity tolerance and maintain comfort levels.   Clinical Goals: PD, pain management  Patient Goals: pain management    Progress made toward(s) clinical / shift goals:     Patient is not progressing towards the following goals:         Problem: Knowledge Deficit - Standard  Goal: Patient and family/care givers will demonstrate understanding of plan of care, disease process/condition, diagnostic tests and medications  Outcome: Progressing     Problem: Fall Risk  Goal: Patient will remain free from falls  Outcome: Progressing     Problem: Pain - Standard  Goal: Alleviation of pain or a reduction in pain to the patient’s comfort goal  Outcome: Progressing

## 2021-11-05 NOTE — PROCEDURES
Nephrology/hemodilaysis note    Patient with ESRD/PD admitted with AMS, seizures    Seen and examined while on CCPD  Tolerates well  VS stable  Lab results reviewed  Please see dialysis flow sheet for details  May d/c home

## 2021-11-05 NOTE — PROGRESS NOTES
[facility]        [facility] connected aseptically at 1930 X10 hours. Pt stable, VSS, alert and oriented. PD exit site CDI. Dressing changed this PM and covered with gauze dressing.      Education provided to primary RN regarding troubleshooting. Report given to Tamara Leroy RN.     Call [facility]/On Call at [phone] for further assistance.

## 2021-11-05 NOTE — PROGRESS NOTES
Bedside report received from NOC RN Tamara PRITCHARD, pt is resting in bed, awake and alert with no reports of pain at this time. Bed is locked in lowest position with call light, belongings within reach, white board updated, and POC discussed, PD completed. All needs met at this time.

## 2021-11-08 ENCOUNTER — PATIENT OUTREACH (OUTPATIENT)
Dept: HEALTH INFORMATION MANAGEMENT | Facility: OTHER | Age: 41
End: 2021-11-08

## 2021-11-09 NOTE — PROGRESS NOTES
RN Transitional Care Management discharge follow up call attempted x2, left voicmails with return call back number.  Patient has discharge follow up appointment scheduled with PCP on 11/23/21. Provided [facility] RN contact number for any additional questions/concerns. Please route chart back to RN if additional follow up is needed/requested.     Thank you!    JOSETTE Geronimo Care Coordinator  [facility] [phone]  Chronic Care Management [phone]

## 2021-11-10 ENCOUNTER — OFFICE VISIT (OUTPATIENT)
Dept: PHYSICAL MEDICINE AND REHAB | Facility: MEDICAL CENTER | Age: 41
End: 2021-11-10
Payer: MEDICARE

## 2021-11-10 VITALS
HEART RATE: 97 BPM | SYSTOLIC BLOOD PRESSURE: 132 MMHG | HEIGHT: 65 IN | BODY MASS INDEX: 30.89 KG/M2 | OXYGEN SATURATION: 96 % | TEMPERATURE: 98.4 F | DIASTOLIC BLOOD PRESSURE: 80 MMHG | WEIGHT: 185.41 LBS

## 2021-11-10 DIAGNOSIS — M79.7 FIBROMYALGIA: ICD-10-CM

## 2021-11-10 DIAGNOSIS — M54.50 CHRONIC BILATERAL LOW BACK PAIN WITHOUT SCIATICA: ICD-10-CM

## 2021-11-10 DIAGNOSIS — M87.052 AVASCULAR NECROSIS OF BONE OF HIP, LEFT (HCC): ICD-10-CM

## 2021-11-10 DIAGNOSIS — G62.9 PERIPHERAL POLYNEUROPATHY: ICD-10-CM

## 2021-11-10 DIAGNOSIS — G89.29 CHRONIC BILATERAL LOW BACK PAIN WITHOUT SCIATICA: ICD-10-CM

## 2021-11-10 PROCEDURE — 99214 OFFICE O/P EST MOD 30 MIN: CPT | Performed by: PHYSICAL MEDICINE & REHABILITATION

## 2021-11-10 RX ORDER — OXYCODONE HYDROCHLORIDE 20 MG/1
1 TABLET ORAL 4 TIMES DAILY PRN
Qty: 98 TABLET | Refills: 0 | Status: ON HOLD | OUTPATIENT
Start: 2021-12-14 | End: 2021-12-01

## 2021-11-10 RX ORDER — OXYCODONE HYDROCHLORIDE 20 MG/1
1 TABLET ORAL 4 TIMES DAILY PRN
Qty: 100 TABLET | Refills: 0 | Status: SHIPPED | OUTPATIENT
Start: 2021-11-16 | End: 2021-12-14 | Stop reason: SDUPTHER

## 2021-11-10 RX ORDER — PREGABALIN 50 MG/1
50 CAPSULE ORAL 2 TIMES DAILY
Qty: 60 CAPSULE | Refills: 2 | Status: SHIPPED | OUTPATIENT
Start: 2021-11-10 | End: 2022-01-07 | Stop reason: SDUPTHER

## 2021-11-10 ASSESSMENT — PAIN SCALES - GENERAL: PAINLEVEL: 6=MODERATE PAIN

## 2021-11-10 ASSESSMENT — PATIENT HEALTH QUESTIONNAIRE - PHQ9
5. POOR APPETITE OR OVEREATING: 1 - SEVERAL DAYS
CLINICAL INTERPRETATION OF PHQ2 SCORE: 1
SUM OF ALL RESPONSES TO PHQ QUESTIONS 1-9: 5

## 2021-11-10 ASSESSMENT — FIBROSIS 4 INDEX: FIB4 SCORE: 1.48

## 2021-11-10 NOTE — PROGRESS NOTES
Follow up patient note  Pain Medicine, Interventional spine and sports physiatry, Physical medicine rehabilitation      Chief complaint:   Diffuse joint and body pain, low back, hips and legs    HISTORY      HPI  Patient identification/Interval history:   Debby Carrizales 41 y.o. female who has chronic pain related to rheumatologic disease, peripheral neuropathy and AVN hips, lupus.      Since last visit, Debby was hospitalized with hyperkalemia and a seizure.  She will follow-up with her Neurologist, Dr. Leonardo, at the end of the month.    She reports that her pain was a little bit worse in the hospital as she reports that her pain medication was reduced.  Also, she is here early as her future scripts were discontinued while she was hospitalized.  No intention to stop medications noted.    She continues to have pain that is about 5-6/10 on the NRS.  Continues activity as tolerated.    Oxycodone helps her to maintain her daily activities.  She has been changing her use of oxycodone to every 4-6h and is open to reduce pain medication.  Tolerating gradual reduction in dose.  Bowel movements are regular.  She is using lactulose and stool softeners.  Lyrica 50mg po bid.       ORT: 3  PHQ-9:0    Review of records:   Hospital discharge report noted from 08/12/2019-08/21/2019 admission.  She was admitted for a near syncopal episode.  Cardiac evaluation was suspicious for endocarditis and she was started on empiric IV abx.  GAYATHRI demonstrate no endocarditis, but did show that her venous catheter was pushing through the tricuspid valve.  Cultures were negative.  Dr. Jansen, vascular surgery, was consulted and they discussed follow-up plans for port management and this will be planned after discharge.    ROS   Unchanged except as in HPI     Red Flags :   Chills, Sweats:No fevers, chills and night sweats.  No fevers lately  Involuntary Weight Loss: Denies  Bowel/Bladder Incontinence: Denies  Saddle Anesthesia:  Denies    PMHx:   Past Medical History:   Diagnosis Date   • Anemia    • Arthritis     all joints,r/t lupus   • Avascular necrosis of bones of both hips ([facility]) 10/10/2016   • Blood clotting disorder ([facility])     in AV Graft   • Clostridium difficile colitis 5/3/2011   • Dialysis patient    • ESBL (extended spectrum beta-lactamase) producing bacteria infection 8/25/2014   • Fall    • Fibromyalgia    • High anion gap metabolic acidosis 4/2/2011   • Hypertension    • Lupus ([facility])    • Pneumonia    • Psychiatric disorder     anxiety, depression   • Pyelonephritis 11/21/2017   • Renal failure    • Sepsis due to pneumonia ([facility]) 6/7/2018   • Status epilepticus ([facility]) 12/13/2018    last seizure 2 mos ago 03/2021       PSHx:   Past Surgical History:   Procedure Laterality Date   • CATH PLACEMENT CAPD N/A 5/5/2021    Procedure: INSERTION, CATHETER, CAPD;  Surgeon: Víctor Berg M.D.;  Location: [facility];  Service: Vascular   • THROMBECTOMY Right 12/7/2020    Procedure: Right upper extremity arteriovenous graft thrombectomy;  Surgeon: Daniel Poole M.D.;  Location: [facility];  Service: Vascular   • CATH PLACEMENT Left 12/7/2020    Procedure: port-a-catheter removal;  Surgeon: Daniel Poole M.D.;  Location: [facility];  Service: Vascular   • GAYATHRI N/A 8/20/2019    Procedure: ECHOCARDIOGRAM, TRANSESOPHAGEAL;  Surgeon: Marta Elaine M.D.;  Location: [facility];  Service: Cardiac   • AV FISTULA REVISION Right 8/11/2018    Procedure: AV FISTULA REVISION- Graft and Thrombectomy;  Surgeon: Shabbir Ardon M.D.;  Location: [facility];  Service: General   • AV FISTULA THROMBOLYSIS Right 8/11/2018    Procedure: AV FISTULA THROMBOLYSIS;  Surgeon: Shabbir Ardon M.D.;  Location: [facility];  Service: General   • AV FISTULA CREATION Right 12/9/2017    Procedure: AV FISTULA CREATION-ARM FOR GRAFT;  Surgeon: Lesly Jansen M.D.;  Location: [facility];  Service: General   • THROMBECTOMY  12/9/2017    Procedure: THROMBECTOMY;  Surgeon: Lesly Jansen M.D.;  Location: SURGERY [facility];  Service: General   • THROMBECTOMY Right 8/21/2016    Procedure: THROMBECTOMY - right AV fistula graft with grams;  Surgeon: Shabbir Ardon M.D.;  Location: SURGERY [facility];  Service:    • ANGIOPLASTY BALLOON  8/21/2016    Procedure: ANGIOPLASTY BALLOON;  Surgeon: Shabbir Ardon M.D.;  Location: SURGERY [facility];  Service:    • THROMBECTOMY Right 8/20/2016    Procedure: THROMBECTOMY AV GRAFT;  Surgeon: Shabbir Ardon M.D.;  Location: [facility];  Service:    • AV FISTULA CREATION Right 7/12/2016    Procedure: AV FISTULA CREATION WITH GRAFT BRACHIAL AXILLARY;  Surgeon: Shabbir Ardon M.D.;  Location: SURGERY [facility];  Service:    • CLOSED REDUCTION Right 7/5/2016    Procedure: CLOSED REDUCTION- Hip ;  Surgeon: Michael Holman M.D.;  Location: [facility];  Service:    • HIP ARTHROPLASTY TOTAL Right 1/18/2016    Procedure: HIP ARTHROPLASTY TOTAL;  Surgeon: Michael Holman M.D.;  Location: [facility];  Service:    • AV FISTULA CREATION  2/3/2015    Performed by Shabbir Ardon M.D. at [facility]   • AV FISTULA CREATION  11/14/2014    Performed by Shabbir Ardon M.D. at [facility]   • AV FISTULA CREATION  9/9/2014    Performed by Shabbir Ardon M.D. at [facility]   • CATH PLACEMENT  9/9/2014    Performed by Shabbir Ardon M.D. at [facility]   • OTHER  5/2011    tracheostomy   • GASTROSCOPY-ENDO  4/27/2011    Performed by PALMIRA DOAN at ENDOSCOPY [facility]   • COLONOSCOPY WITH BIOPSY  4/20/2011    Performed by ALCIRA CRUZ at ENDOSCOPY [facility]   • GASTROSCOPY-ENDO  4/18/2011    Performed by ALCIRA CRUZ at ENDOSCOPY [facility]   • GASTROSCOPY-ENDO  4/10/2011    Performed by SYED JURADO at  ENDOSCOPY [facility]   • SCLEROTHERAPHY  4/10/2011    Performed by SYED JURADO at ENDOSCOPY [facility]   • OTHER ABDOMINAL SURGERY      kidney biopsy   • TRACHEOSTOMY         Family history   Family History   Problem Relation Age of Onset   • Heart Disease Mother    • Cancer Father        Medications:   Outpatient Medications Marked as Taking for the 11/10/21 encounter (Office Visit) with Quinn Chandler M.D.   Medication Sig Dispense Refill   • [START ON 11/16/2021] Oxycodone HCl 20 MG Tab Take 1 Tablet by mouth 4 times a day as needed (pain) for up to 28 days. 100 Tablet 0   • [START ON 12/14/2021] Oxycodone HCl 20 MG Tab Take 1 Tablet by mouth 4 times a day as needed (pain) for up to 28 days. 98 Tablet 0   • pregabalin (LYRICA) 50 MG capsule Take 1 Capsule by mouth 2 times a day for 84 days. 60 Capsule 2       Allergies:   Allergies   Allergen Reactions   • Lorazepam [Ativan] Anxiety and Unspecified     Patient becomes severely paranoid and agitated, hallucinates   • Morphine Itching     Tolerates Dilaudid   • Seasonal Runny Nose and Itching     Hay fever, sabiha brush       Social Hx:   Social History     Socioeconomic History   • Marital status: Single     Spouse name: Not on file   • Number of children: Not on file   • Years of education: Not on file   • Highest education level: Not on file   Occupational History   • Not on file   Tobacco Use   • Smoking status: Current Every Day Smoker     Packs/day: 0.50     Years: 18.00     Pack years: 9.00     Start date: 1995     Last attempt to quit: 6/13/2011     Years since quitting: 10.4   • Smokeless tobacco: Never Used   • Tobacco comment: vaping now   Vaping Use   • Vaping Use: Some days   • Substances: Flavoring   • Devices: Disposable   Substance and Sexual Activity   • Alcohol use: No     Alcohol/week: 0.0 oz   • Drug use: No     Types: Marijuana   • Sexual activity: Not Currently     Partners: Male   Other Topics Concern   •  Service  "No   • Blood Transfusions Yes   • Caffeine Concern No   • Occupational Exposure No   • Hobby Hazards No   • Sleep Concern Yes   • Stress Concern Yes   • Weight Concern Yes   • Special Diet Yes   • Back Care No   • Exercise Yes   • Bike Helmet No   • Seat Belt Yes   • Self-Exams Yes   Social History Narrative   • Not on file     Social Determinants of Health     Financial Resource Strain: Medium Risk   • Difficulty of Paying Living Expenses: Somewhat hard   Food Insecurity: Food Insecurity Present   • Worried About Running Out of Food in the Last Year: Often true   • Ran Out of Food in the Last Year: Often true   Transportation Needs: Unmet Transportation Needs   • Lack of Transportation (Medical): Yes   • Lack of Transportation (Non-Medical): Yes   Physical Activity:    • Days of Exercise per Week: Not on file   • Minutes of Exercise per Session: Not on file   Stress:    • Feeling of Stress : Not on file   Social Connections:    • Frequency of Communication with Friends and Family: Not on file   • Frequency of Social Gatherings with Friends and Family: Not on file   • Attends Taoism Services: Not on file   • Active Member of Clubs or Organizations: Not on file   • Attends Club or Organization Meetings: Not on file   • Marital Status: Not on file   Intimate Partner Violence:    • Fear of Current or Ex-Partner: Not on file   • Emotionally Abused: Not on file   • Physically Abused: Not on file   • Sexually Abused: Not on file   Housing Stability:    • Unable to Pay for Housing in the Last Year: Not on file   • Number of Places Lived in the Last Year: Not on file   • Unstable Housing in the Last Year: Not on file       EXAMINATION     Physical Exam:   Vitals: /80 (BP Location: Left arm, Patient Position: Sitting, BP Cuff Size: Small adult)   Pulse 97   Temp 36.9 °C (98.4 °F) (Temporal)   Ht 1.651 m (5' 5\")   Wt 84.1 kg (185 lb 6.5 oz)   SpO2 96%     Constitutional:   Body Habitus: Body mass index is 30.85 " kg/m².  Cooperation: Fully cooperates with exam  Appearance: Well-groomed no disheveled, in no acute distress, wearing a face mask   Respiratory-  breathing comfortable on room air, no wheezing.  Cardiovascular- trace edema in the lower extremities  Psychiatric- alert and oriented ×3. Normal affect.   Spine:   No skin lesions, warmth or erythema over the injection signs. PD catheter noted  Tenderness to palpation over the thoracic and lumbar paraspinals bilaterally, right greater than the left  Gait slow, steady without loss of balance.  No use of assist device.       MEDICAL DECISION MAKING    DATA    Labs:     UDS:  08/20/2021: positive for oxycodone and metabolites  01/08/2021: positive for oxycodone and metabolites  03/12/2020 Positive for oxycodone and metabolites  12/19/2019 oxycodone and metabolites   08/22/2019 Oxycodone and metabolites (patient does not have a script for phenergan with codeine and this was negative) Therefore, consistent with medications  06/13/2019 Negative for oxycodone, positive for other oxycodone metabolites  04/16/2019 Positive for oxymorphone  01/17/2019 Oxycodone and metabolites as expected  10/02/2018 Oxycodone and metabolites, also fentanyl    01/09/2019: Hep B surface ag negative  01/08/2019: GFR 7; BUN 23 Cr 6.68, Plt 160    12/15/2018 CRP 3.03    GFR 12/06/2020 4  GFR 08/21/2019, 4  08/21/2019 WBC 4.6, Hb 10.1, Hct 31.6, Plt 156    BMP 12/16/2018  Na 136, Potassium 4.4, chloride 96, CO2 23 Glucose 83, BUN 55H, Cr 9.44H, Calcium 9.9, Anion gap 17.0H    CBC 3.3, Hb 9.6, Hct 31.3, Plt 115    Lab Results   Component Value Date/Time    HBA1C 4.9 06/07/2018 02:29 AM        Imaging:   I reviewed the following radiology reports reviewed  GAYATHRI 08/20/2019  Echocardiography Laboratory  CONCLUSIONS  LV EF    65%.  Structurally normal tricuspid valve.  Trace tricuspid regurgitation.  Port catheter noted in the SVC and right atrium.  Tip support appears bright, no obvious vegetation.  The tip  of the port abuts the tricuspid valve.  Recommend the port be pulled back approximately 2 cm.  No evidence of endocarditis.    Xray hip with pelvis-left 07/28/2019  Left femoral head heterogeneous density is compatible with known diagnosis of avascular necrosis. There is no joint space narrowing or spurring    Right arthroplasty without evident complication.    Xray left foot 01/21/2019  Nondisplaced transverse fracture through the base of the fifth metatarsal.    MRI lumbar spine 1/1/19  1.  Diffusely decreased signal again seen throughout the marrow space consistent with red marrow conversion, likely secondary to chronic anemia.  2.  No significant disc bulging, central canal narrowing, or foraminal narrowing.  3.  No lumbar spine fracture or subluxation.    MRI brain 12/13/2018  1.  There is no hippocampal sclerosis.  2.  There is no evidence of cortical dysplasia or neuronal migrational abnormalities.  3.  A few nonspecific T2 hyperintensities in the supratentorial white matter likely representing nonspecific foci of gliosis, chronic ischemia and demyelination. This is unchanged since the previous MRI dated 2/23/2012.       Chest xray 06/07/2018: Minimal right-sided opacities as described above, suggesting pneumonia.               Results for orders placed during the hospital encounter of 07/19/17   MR-LUMBAR SPINE-W/O    Impression 1.  Diffuse decreased bone marrow signal intensity throughout the lumbar spine and sacrum, presumably secondary to chronic anemia.    2.  Otherwise unremarkable MRI scan of the lumbar spine without contrast.                                    DIAGNOSIS   Visit Diagnoses     ICD-10-CM   1. Peripheral polyneuropathy  G62.9   2. Avascular necrosis of bone of hip, left (HCC)  M87.052   3. Chronic bilateral low back pain without sciatica  M54.50    G89.29   4. Fibromyalgia  M79.7         ASSESSMENT and PLAN:     Debby Carrizales 41 y.o. Female returns for follow-up of her chronic  pain related to lupus, AVN hips, low back and peripheral neuropathy    Debby was seen today for follow-up.    Diagnoses and all orders for this visit:    Peripheral polyneuropathy  -     Oxycodone HCl 20 MG Tab; Take 1 Tablet by mouth 4 times a day as needed (pain) for up to 28 days.  -     Oxycodone HCl 20 MG Tab; Take 1 Tablet by mouth 4 times a day as needed (pain) for up to 28 days.  -     pregabalin (LYRICA) 50 MG capsule; Take 1 Capsule by mouth 2 times a day for 84 days.  -     Consent for Opiate Prescription  -     Controlled Substance Treatment Agreement    Avascular necrosis of bone of hip, left (HCC)  -     Oxycodone HCl 20 MG Tab; Take 1 Tablet by mouth 4 times a day as needed (pain) for up to 28 days.  -     Oxycodone HCl 20 MG Tab; Take 1 Tablet by mouth 4 times a day as needed (pain) for up to 28 days.  -     pregabalin (LYRICA) 50 MG capsule; Take 1 Capsule by mouth 2 times a day for 84 days.  -     Consent for Opiate Prescription  -     Controlled Substance Treatment Agreement    Chronic bilateral low back pain without sciatica  -     Oxycodone HCl 20 MG Tab; Take 1 Tablet by mouth 4 times a day as needed (pain) for up to 28 days.  -     Oxycodone HCl 20 MG Tab; Take 1 Tablet by mouth 4 times a day as needed (pain) for up to 28 days.  -     Consent for Opiate Prescription  -     Controlled Substance Treatment Agreement    Fibromyalgia  -     pregabalin (LYRICA) 50 MG capsule; Take 1 Capsule by mouth 2 times a day for 84 days.         1. Continue with current medications.  Continue lyrica 50mg po bid.  Refill given.  2. Reviewed .  Most recent UDS 08/20/2021 as expected.  Consents obtained.  Plan to continue with oxycodone.  Gradual wean, #100 for upcoming month and reduce to #98 for the next month  3. Follow-up with Neurology, Nephrology and her PCP.  4. Continue activity as tolerated.      In prescribing controlled substances to this patient, I certify that I have obtained and reviewed the  "medical history of Debby Carrizales. I have also made a good ly effort to obtain applicable records from other providers who have treated the patient and records demonstrating the following: report of \"drug-seeking behavior,\" although I suspect that she has organic reasons for pain and will continue to monitor as appropriate.     I have conducted a physical exam and documented it. I have reviewed Ms. Carrizales’s prescription history as maintained by the [place] Prescription Monitoring Program.     I have assessed the patient’s risk for abuse, dependency, and addiction using the validated Opioid Risk Tool available at https://www.mdcalc.com/fcfftw-zmfz-jkiw-ort-narcotic-abuse.     Given the above, I believe the benefits of controlled substance therapy outweigh the risks. The reasons for prescribing controlled substances include non-narcotic, oral analgesic alternatives have been inadequate for pain control and in my professional opinion, controlled substances are the only reasonable choice for this patient because she has limitations to medication choices due to being on dialysis.   Accordingly, I have discussed the risk and benefits, treatment plan, and alternative therapies with the patient.     Follow up: 2 months    Please note that this dictation was created using voice recognition software. I have made every reasonable attempt to correct obvious errors but there may be errors of grammar and content that I may have overlooked prior to finalization of this note.      Quinn Chandler MD  Interventional Spine and Sports Physiatry  Physical Medicine and Rehabilitation  [facility]  "

## 2021-11-11 ENCOUNTER — PATIENT OUTREACH (OUTPATIENT)
Dept: HEALTH INFORMATION MANAGEMENT | Facility: OTHER | Age: 41
End: 2021-11-11

## 2021-11-11 NOTE — PROGRESS NOTES
TEJAL Stuart called patient via mobile phone and left a voicemail introducing Community Care Management and provided all information for upcoming appointment and provided all contact information.

## 2021-11-15 ENCOUNTER — PATIENT OUTREACH (OUTPATIENT)
Dept: HEALTH INFORMATION MANAGEMENT | Facility: OTHER | Age: 41
End: 2021-11-15

## 2021-11-16 NOTE — PROGRESS NOTES
Community Health Worker Intake  • Social determinates of health intake : Complete   • Identified barriers to : Housing  • Contact information provided to Debby Moon Donthu Yes  • Has PCP appointment scheduled for : 11/23/2021 @ 3 pm  • Scheduled Food Delivery/Home Visit/Outpatient Visit: Yes 11/19/2021  • Outpatient assessment completed.  • Did the patient receive medications post discharge: Yes    TEJAL Stuart called patient via mobile phone and introduced Community Care Management . Patient states she lives alone and has to find a place to live as her contract will be up in three months. Patient receives $900 monthly for disability.  Patient is an active  and has access to use her grandmother's car. Patient is on home dialysis's and receives SNAP benefits. Patient has no questions or concerns with medications.     Plan:   TEJAL Stuart will do a home food delivery on 11/19/2021 and provide resources for affordable housing , care chest as patient is going on vacation and needs a electric wheelchair. Patient would like information on dental services. Medicare does not cover routine dental services, such as cleanings, or other standard procedures like dentures, crowns, or fillings.TEJAL Stuart verified benefits on Medicaid EVS website and no dental / MTM benefits.

## 2021-11-22 ENCOUNTER — PATIENT OUTREACH (OUTPATIENT)
Dept: HEALTH INFORMATION MANAGEMENT | Facility: OTHER | Age: 41
End: 2021-11-22

## 2021-11-22 NOTE — PROGRESS NOTES
TEJAL Stuart called patient via mobile phone and unable to leave a voicemail as mailbox is full.    TEJAL Stuart called Shruthi ( Grandmother- HIPPA approved) and she states patient was not feeling well today and states her legs are swollen. Shruthi is going to visit the patient today and will provide all information for patient to call [facility] and advise patient that mail box is full.

## 2021-11-23 ENCOUNTER — OFFICE VISIT (OUTPATIENT)
Dept: MEDICAL GROUP | Facility: MEDICAL CENTER | Age: 41
End: 2021-11-23
Payer: MEDICARE

## 2021-11-23 VITALS
RESPIRATION RATE: 16 BRPM | HEART RATE: 88 BPM | DIASTOLIC BLOOD PRESSURE: 78 MMHG | WEIGHT: 190 LBS | TEMPERATURE: 97.6 F | OXYGEN SATURATION: 95 % | BODY MASS INDEX: 31.65 KG/M2 | HEIGHT: 65 IN | SYSTOLIC BLOOD PRESSURE: 136 MMHG

## 2021-11-23 DIAGNOSIS — M32.9 SYSTEMIC LUPUS ERYTHEMATOSUS, UNSPECIFIED SLE TYPE, UNSPECIFIED ORGAN INVOLVEMENT STATUS (HCC): ICD-10-CM

## 2021-11-23 DIAGNOSIS — G89.29 OTHER CHRONIC PAIN: ICD-10-CM

## 2021-11-23 DIAGNOSIS — I77.0 A-V FISTULA (HCC): ICD-10-CM

## 2021-11-23 DIAGNOSIS — N18.6 ESRD (END STAGE RENAL DISEASE) ON DIALYSIS (HCC): ICD-10-CM

## 2021-11-23 DIAGNOSIS — D61.818 PANCYTOPENIA (HCC): ICD-10-CM

## 2021-11-23 DIAGNOSIS — E66.9 OBESITY (BMI 30-39.9): ICD-10-CM

## 2021-11-23 DIAGNOSIS — I10 HYPERTENSION, UNSPECIFIED TYPE: ICD-10-CM

## 2021-11-23 DIAGNOSIS — G40.909 SEIZURE DISORDER (HCC): ICD-10-CM

## 2021-11-23 DIAGNOSIS — Z99.2 ESRD (END STAGE RENAL DISEASE) ON DIALYSIS (HCC): ICD-10-CM

## 2021-11-23 DIAGNOSIS — F11.20 UNCOMPLICATED OPIOID DEPENDENCE (HCC): ICD-10-CM

## 2021-11-23 PROBLEM — G93.41 ACUTE METABOLIC ENCEPHALOPATHY: Status: RESOLVED | Noted: 2019-10-02 | Resolved: 2021-11-23

## 2021-11-23 PROBLEM — S62.347A CLOSED NONDISPLACED FRACTURE OF BASE OF FIFTH METACARPAL BONE OF LEFT HAND: Status: RESOLVED | Noted: 2019-02-01 | Resolved: 2021-11-23

## 2021-11-23 PROBLEM — R56.9 SEIZURES (HCC): Status: RESOLVED | Noted: 2019-01-05 | Resolved: 2021-11-23

## 2021-11-23 PROCEDURE — 99214 OFFICE O/P EST MOD 30 MIN: CPT | Performed by: FAMILY MEDICINE

## 2021-11-23 ASSESSMENT — FIBROSIS 4 INDEX: FIB4 SCORE: 1.48

## 2021-11-23 NOTE — PROGRESS NOTES
Subjective:     Debby Carrizales is a 41 y.o. female presenting for a hospital follow-up.  Was admitted 11/1-11/5 with seizure-like activity.  She believes her symptoms were due to a high blood pressures.  She continues to see nephrology regularly, does dialysis.  Her AV fistula is no longer working, is doing peritoneal dialysis at home.  She reports that her blood pressure medication was adjusted and plans to follow-up with nephrology.  She also reports that she has been getting the hepatitis B vaccine there.  She continues on the amlodipine, lisinopril, Renvela.  Her hospitalization records indicate that her seizures could be due to missing Vimpat.  She reports taking 100 mg twice a day, sees neurology/Dr. Leonardo regularly.  She also sees Dr. Collado for her lupus regularly, Dr. Chandler for chronic pain        Current Outpatient Medications:   •  RENVELA 800 MG Tab tablet, TAKE 4 TABLETS BY MOUTH THREE TIMES DAILY WITH MEALS, AND 2 TABLETS BY MOUTH WITH SNACKS, Disp: 420 Tablet, Rfl: 11  •  promethazine (PHENERGAN) 25 MG Tab, Take 1 Tablet by mouth every 8 hours as needed for Nausea/Vomiting., Disp: 60 Tablet, Rfl: 11  •  Oxycodone HCl 20 MG Tab, Take 1 Tablet by mouth 4 times a day as needed (pain) for up to 28 days., Disp: 100 Tablet, Rfl: 0  •  [START ON 12/14/2021] Oxycodone HCl 20 MG Tab, Take 1 Tablet by mouth 4 times a day as needed (pain) for up to 28 days., Disp: 98 Tablet, Rfl: 0  •  pregabalin (LYRICA) 50 MG capsule, Take 1 Capsule by mouth 2 times a day for 84 days., Disp: 60 Capsule, Rfl: 2  •  lisinopril (PRINIVIL) 20 MG Tab, Take 20 mg by mouth every day., Disp: , Rfl:   •  lactulose 10 GM/15ML Solution, Take 20 g by mouth 1 time a day as needed (Constipation). 30 mL = 20 mg., Disp: , Rfl:   •  traZODone (DESYREL) 50 MG Tab, TAKE 1 TABLET BY MOUTH EVERY NIGHT AT BEDTIME, Disp: 90 tablet, Rfl: 1  •  tizanidine (ZANAFLEX) 4 MG Tab, TAKE 2 TABLETS BY MOUTH AT BEDTIME AS NEEDED FOR MUSCLE SPASMS,  "Disp: 180 tablet, Rfl: 3  •  amLODIPine (NORVASC) 10 MG Tab, Take 1 tablet by mouth every day., Disp: 90 tablet, Rfl: 3  •  Naloxone (NARCAN) 4 MG/0.1ML Liquid, Administer 4 mg into affected nostril(S) one time as needed (for severe sleepiness or difficulty breathing due to opioid overdose) for up to 1 dose., Disp: 2 Each, Rfl: 0  •  lacosamide (VIMPAT) 100 MG Tab tablet, Take 100 mg by mouth 2 Times a Day., Disp: , Rfl:     Objective:     Vitals: /78   Pulse 88   Temp 36.4 °C (97.6 °F)   Resp 16   Ht 1.651 m (5' 5\")   Wt 86.2 kg (190 lb)   SpO2 95%   BMI 31.62 kg/m²   General: Alert  HEENT: Normocephalic.  Heart: Regular rate and rhythm.  S1 and S2 normal.  No murmurs appreciated.  Respiratory: Normal respiratory effort.  Clear to auscultation bilaterally.  Abdomen: Non-distended, soft    Assessment/Plan:     Debby was seen today for hospital follow-up.    Diagnoses and all orders for this visit:    Seizure disorder (HCC)  Chronic, stable, managed by neurology.  Hospitalization records reviewed.    ESRD (end stage renal disease) on dialysis (HCC)  A-V fistula (HCC)  Hypertension, unspecified type  Chronic, stable, managed by nephrology    Systemic lupus erythematosus, unspecified SLE type, unspecified organ involvement status (HCC)  Pancytopenia (HCC)  Chronic, stable, managed by rheumatology    Other chronic pain  Uncomplicated opioid dependence (HCC)  Chronic, stable, managed by physiatry    Obesity (BMI 30-39.9)  -     Patient identified as having weight management issue.  Appropriate orders and counseling given.        "

## 2021-11-24 ENCOUNTER — PATIENT OUTREACH (OUTPATIENT)
Dept: HEALTH INFORMATION MANAGEMENT | Facility: OTHER | Age: 41
End: 2021-11-24

## 2021-11-24 NOTE — PROGRESS NOTES
TEJAL Stuart called patient via mobile phone and patient states she is feeling better . Patient completed follow up appointment with PCP and has not other needs .  TEJAL Staurt will discharge patient from Community Care Management and remove from case load and master list as all goals have been met.

## 2021-11-29 NOTE — PROGRESS NOTES
Hospital Medicine Daily Progress Note    Date of Service  6/22/2019    Chief Complaint  38 y.o. female admitted 6/21/2019 with left hip pain    Hospital Course    Very complicated past medical history including lupus complicate a by end-stage renal disease on dialysis Monday Wednesday Friday, avascular necrosis of bilateral hip status post right hip replacement in the past, chronic pain with narcotic dependence, uncontrolled hypertension admitted with severe left hip pain      Interval Problem Update  6/22: Got dialysis today, pain not well controlled and left hip.  No evidence of infection    Consultants/Specialty  Dr. Byers, nephrology    Code Status  Full    Disposition  Work on pain control, PT OT  No needs on discharge other than follow-up with orthopedic surgery    Review of Systems  Review of Systems   Constitutional: Positive for malaise/fatigue. Negative for chills and fever.   HENT: Negative for sore throat.    Respiratory: Negative for cough and shortness of breath.    Cardiovascular: Negative for chest pain and palpitations.   Gastrointestinal: Negative for abdominal pain, blood in stool, diarrhea, heartburn, nausea and vomiting.   Genitourinary: Negative for dysuria and frequency.   Musculoskeletal: Positive for joint pain and myalgias. Negative for back pain.   Neurological: Negative for dizziness, focal weakness, weakness and headaches.   Psychiatric/Behavioral: Negative for depression and hallucinations.   All other systems reviewed and are negative.       Physical Exam  Temp:  [36.7 °C (98.1 °F)-36.8 °C (98.3 °F)] 36.8 °C (98.2 °F)  Pulse:  [64-80] 72  Resp:  [18] 18  BP: (126-165)/(84-98) 155/84  SpO2:  [90 %-99 %] 91 %    Physical Exam   Constitutional: She is oriented to person, place, and time.   Very chronically ill-appearing appears older than stated age   HENT:   Head: Normocephalic.   Mouth/Throat: No oropharyngeal exudate.   Eyes: Pupils are equal, round, and reactive to light. No scleral  icterus.   Neck: Normal range of motion. Neck supple. No JVD present. No thyromegaly present.   Cardiovascular: Normal rate and regular rhythm.    No murmur heard.  Pulmonary/Chest: Effort normal and breath sounds normal. No respiratory distress. She has no wheezes.   Abdominal: Soft. Bowel sounds are normal. She exhibits no distension. There is no tenderness.   Musculoskeletal: She exhibits no edema or tenderness.   Reduced range of motion bilateral hips due to pain   Neurological: She is alert and oriented to person, place, and time. No cranial nerve deficit. Coordination normal.   Skin: Skin is warm and dry.   Ashen skin   Psychiatric: She has a normal mood and affect. Her behavior is normal.       Fluids    Intake/Output Summary (Last 24 hours) at 06/22/19 1548  Last data filed at 06/22/19 1200   Gross per 24 hour   Intake             1301 ml   Output              450 ml   Net              851 ml       Laboratory  Recent Labs      06/21/19   1645  06/22/19   0500   WBC  7.1  4.0*   RBC  3.33*  3.33*   HEMOGLOBIN  10.2*  10.3*   HEMATOCRIT  31.7*  32.9*   MCV  95.2  98.8*   MCH  30.6  30.9   MCHC  32.2*  31.3*   RDW  42.7  44.7   PLATELETCT  128*  122*   MPV  10.0  10.0     Recent Labs      06/21/19   1645  06/22/19   0500   SODIUM  134*  137   POTASSIUM  4.9  4.9   CHLORIDE  99  99   CO2  20  24   GLUCOSE  83  74   BUN  48*  51*   CREATININE  12.31*  13.24*   CALCIUM  7.9*  8.0*                   Imaging  DX-CHEST-2 VIEWS   Final Result      Mild patchy lingular opacity may represent atelectasis or pneumonitis.      Linear retrocardiac opacities likely represent atelectasis.      Linear opacities in the peripheral left mid to lower lung zone may represent atelectasis or scarring.      Stable cardiomegaly.      No pleural effusion is identified.            DX-CHEST-PORTABLE (1 VIEW)   Final Result      Rounded retrocardiac opacity not excluded. Further evaluation with PA and lateral plain films of the chest  recommended.      Cardiomegaly and mild pulmonary vascular congestion.      Blunting of the left costophrenic angle may be related to pleural thickening or trace pleural fluid.         DX-HIP-BILATERAL-WITH PELVIS-3/4 VIEWS   Final Result      Successful relocation of right hip arthroplasty dislocation      Above average stool volume      Otherwise negative bilateral hip radiographs           Assessment/Plan  Avascular necrosis of bones of both hips (HCC)- (present on admission)   Assessment & Plan    She has a history of avascular necrosis of bilateral hips.  She has been following pain specialist for her pain control and has seen Dr. Holman orthopedic surgery for right hip replacement in the past  She needs a left hip replacement, admitted for progressive/uncontrolled pain  Right hip joint did not show any specific erythema and swelling.   Continue Dilaudid for pain control and monitor for adverse effect-- wean and change to PO  Continue output oxycodone.  No need for antibiotics at this time     ESRD (end stage renal disease) on dialysis ([facility])- (present on admission)   Assessment & Plan    She has a history of end-stage renal disease and she gets hemodialysis on Monday Wednesday and Friday.  HD performed 6/22  Dr. Byers     Hypertension- (present on admission)   Assessment & Plan    Continue amlodipine  Uncontrolled, >165 - if still elevated in AM, consider additional agent     Anemia in chronic kidney disease, on chronic dialysis ([facility])- (present on admission)   Assessment & Plan    No evidence of blood loss, monitor  She is at her baseline of around 10     Opioid dependence ([facility])- (present on admission)   Assessment & Plan    She has been following pain doctor.          VTE prophylaxis: Heparin       120

## 2021-11-30 ENCOUNTER — PRE-ADMISSION TESTING (OUTPATIENT)
Dept: ADMISSIONS | Facility: MEDICAL CENTER | Age: 41
End: 2021-11-30
Payer: MEDICARE

## 2021-11-30 NOTE — OR NURSING
COVID-19 Pre-surgery screening:    Called Pt for screening, her voice mailbox is full and not accepting messages.  I called the alternate number (Grandmother) and left a message for Pt to call us back for screening.

## 2021-12-01 ENCOUNTER — HOSPITAL ENCOUNTER (OUTPATIENT)
Facility: MEDICAL CENTER | Age: 41
End: 2021-12-01
Attending: SURGERY | Admitting: SURGERY
Payer: MEDICARE

## 2021-12-01 ENCOUNTER — ANESTHESIA EVENT (OUTPATIENT)
Dept: SURGERY | Facility: MEDICAL CENTER | Age: 41
End: 2021-12-01
Payer: MEDICARE

## 2021-12-01 ENCOUNTER — APPOINTMENT (OUTPATIENT)
Dept: RADIOLOGY | Facility: MEDICAL CENTER | Age: 41
End: 2021-12-01
Attending: SURGERY
Payer: MEDICARE

## 2021-12-01 ENCOUNTER — ANESTHESIA (OUTPATIENT)
Dept: SURGERY | Facility: MEDICAL CENTER | Age: 41
End: 2021-12-01
Payer: MEDICARE

## 2021-12-01 VITALS
RESPIRATION RATE: 18 BRPM | OXYGEN SATURATION: 96 % | BODY MASS INDEX: 30.38 KG/M2 | DIASTOLIC BLOOD PRESSURE: 64 MMHG | TEMPERATURE: 48.2 F | WEIGHT: 182.32 LBS | SYSTOLIC BLOOD PRESSURE: 117 MMHG | HEART RATE: 92 BPM | HEIGHT: 65 IN

## 2021-12-01 DIAGNOSIS — G89.18 POSTOPERATIVE PAIN: ICD-10-CM

## 2021-12-01 LAB
ANION GAP SERPL CALC-SCNC: 18 MMOL/L (ref 7–16)
BUN SERPL-MCNC: 68 MG/DL (ref 8–22)
CALCIUM SERPL-MCNC: 8.1 MG/DL (ref 8.5–10.5)
CHLORIDE SERPL-SCNC: 98 MMOL/L (ref 96–112)
CO2 SERPL-SCNC: 25 MMOL/L (ref 20–33)
CREAT SERPL-MCNC: 14.71 MG/DL (ref 0.5–1.4)
EXTERNAL QUALITY CONTROL: NORMAL
GLUCOSE SERPL-MCNC: 87 MG/DL (ref 65–99)
HCG SERPL QL: NEGATIVE
POTASSIUM SERPL-SCNC: 4.6 MMOL/L (ref 3.6–5.5)
SARS-COV+SARS-COV-2 AG RESP QL IA.RAPID: NEGATIVE
SODIUM SERPL-SCNC: 141 MMOL/L (ref 135–145)

## 2021-12-01 PROCEDURE — 160047 HCHG PACU  - EA ADDL 30 MINS PHASE II: Performed by: SURGERY

## 2021-12-01 PROCEDURE — C1788 PORT, INDWELLING, IMP: HCPCS | Performed by: SURGERY

## 2021-12-01 PROCEDURE — C1769 GUIDE WIRE: HCPCS | Performed by: SURGERY

## 2021-12-01 PROCEDURE — 84703 CHORIONIC GONADOTROPIN ASSAY: CPT

## 2021-12-01 PROCEDURE — 501838 HCHG SUTURE GENERAL: Performed by: SURGERY

## 2021-12-01 PROCEDURE — 160025 RECOVERY II MINUTES (STATS): Performed by: SURGERY

## 2021-12-01 PROCEDURE — 700111 HCHG RX REV CODE 636 W/ 250 OVERRIDE (IP): Performed by: ANESTHESIOLOGY

## 2021-12-01 PROCEDURE — 700101 HCHG RX REV CODE 250: Performed by: SURGERY

## 2021-12-01 PROCEDURE — 160009 HCHG ANES TIME/MIN: Performed by: SURGERY

## 2021-12-01 PROCEDURE — 160035 HCHG PACU - 1ST 60 MINS PHASE I: Performed by: SURGERY

## 2021-12-01 PROCEDURE — 700111 HCHG RX REV CODE 636 W/ 250 OVERRIDE (IP): Performed by: SURGERY

## 2021-12-01 PROCEDURE — 160036 HCHG PACU - EA ADDL 30 MINS PHASE I: Performed by: SURGERY

## 2021-12-01 PROCEDURE — 160039 HCHG SURGERY MINUTES - EA ADDL 1 MIN LEVEL 3: Performed by: SURGERY

## 2021-12-01 PROCEDURE — A9270 NON-COVERED ITEM OR SERVICE: HCPCS | Performed by: ANESTHESIOLOGY

## 2021-12-01 PROCEDURE — 80048 BASIC METABOLIC PNL TOTAL CA: CPT

## 2021-12-01 PROCEDURE — 87426 SARSCOV CORONAVIRUS AG IA: CPT | Performed by: SURGERY

## 2021-12-01 PROCEDURE — 501837 HCHG SUTURE CV: Performed by: SURGERY

## 2021-12-01 PROCEDURE — 160046 HCHG PACU - 1ST 60 MINS PHASE II: Performed by: SURGERY

## 2021-12-01 PROCEDURE — 500002 HCHG ADHESIVE, DERMABOND: Performed by: SURGERY

## 2021-12-01 PROCEDURE — 160002 HCHG RECOVERY MINUTES (STAT): Performed by: SURGERY

## 2021-12-01 PROCEDURE — 700105 HCHG RX REV CODE 258: Performed by: ANESTHESIOLOGY

## 2021-12-01 PROCEDURE — 160028 HCHG SURGERY MINUTES - 1ST 30 MINS LEVEL 3: Performed by: SURGERY

## 2021-12-01 PROCEDURE — 700102 HCHG RX REV CODE 250 W/ 637 OVERRIDE(OP): Performed by: ANESTHESIOLOGY

## 2021-12-01 PROCEDURE — 700101 HCHG RX REV CODE 250: Performed by: ANESTHESIOLOGY

## 2021-12-01 PROCEDURE — 700105 HCHG RX REV CODE 258: Performed by: SURGERY

## 2021-12-01 PROCEDURE — 160048 HCHG OR STATISTICAL LEVEL 1-5: Performed by: SURGERY

## 2021-12-01 DEVICE — POWER PORTMRI COMPATABLE: Type: IMPLANTABLE DEVICE | Status: FUNCTIONAL

## 2021-12-01 RX ORDER — ONDANSETRON 2 MG/ML
INJECTION INTRAMUSCULAR; INTRAVENOUS PRN
Status: DISCONTINUED | OUTPATIENT
Start: 2021-12-01 | End: 2021-12-02 | Stop reason: SURG

## 2021-12-01 RX ORDER — DEXAMETHASONE SODIUM PHOSPHATE 4 MG/ML
INJECTION, SOLUTION INTRA-ARTICULAR; INTRALESIONAL; INTRAMUSCULAR; INTRAVENOUS; SOFT TISSUE PRN
Status: DISCONTINUED | OUTPATIENT
Start: 2021-12-01 | End: 2021-12-02 | Stop reason: SURG

## 2021-12-01 RX ORDER — HYDROCODONE BITARTRATE AND ACETAMINOPHEN 5; 325 MG/1; MG/1
1-2 TABLET ORAL EVERY 4 HOURS PRN
Qty: 10 TABLET | Refills: 0 | Status: SHIPPED | OUTPATIENT
Start: 2021-12-01 | End: 2021-12-04

## 2021-12-01 RX ORDER — HYDROMORPHONE HYDROCHLORIDE 1 MG/ML
0.2 INJECTION, SOLUTION INTRAMUSCULAR; INTRAVENOUS; SUBCUTANEOUS
Status: DISCONTINUED | OUTPATIENT
Start: 2021-12-01 | End: 2021-12-01 | Stop reason: HOSPADM

## 2021-12-01 RX ORDER — BUPIVACAINE HYDROCHLORIDE AND EPINEPHRINE 5; 5 MG/ML; UG/ML
INJECTION, SOLUTION PERINEURAL
Status: DISCONTINUED | OUTPATIENT
Start: 2021-12-01 | End: 2021-12-01 | Stop reason: HOSPADM

## 2021-12-01 RX ORDER — MEPERIDINE HYDROCHLORIDE 25 MG/ML
12.5 INJECTION INTRAMUSCULAR; INTRAVENOUS; SUBCUTANEOUS
Status: DISCONTINUED | OUTPATIENT
Start: 2021-12-01 | End: 2021-12-01 | Stop reason: HOSPADM

## 2021-12-01 RX ORDER — DIPHENHYDRAMINE HYDROCHLORIDE 50 MG/ML
12.5 INJECTION INTRAMUSCULAR; INTRAVENOUS
Status: DISCONTINUED | OUTPATIENT
Start: 2021-12-01 | End: 2021-12-01 | Stop reason: HOSPADM

## 2021-12-01 RX ORDER — CEFAZOLIN SODIUM 1 G/3ML
INJECTION, POWDER, FOR SOLUTION INTRAMUSCULAR; INTRAVENOUS PRN
Status: DISCONTINUED | OUTPATIENT
Start: 2021-12-01 | End: 2021-12-02 | Stop reason: SURG

## 2021-12-01 RX ORDER — SODIUM CHLORIDE 9 MG/ML
INJECTION, SOLUTION INTRAVENOUS ONCE
Status: COMPLETED | OUTPATIENT
Start: 2021-12-01 | End: 2021-12-01

## 2021-12-01 RX ORDER — SODIUM CHLORIDE, SODIUM LACTATE, POTASSIUM CHLORIDE, CALCIUM CHLORIDE 600; 310; 30; 20 MG/100ML; MG/100ML; MG/100ML; MG/100ML
INJECTION, SOLUTION INTRAVENOUS
Status: DISCONTINUED | OUTPATIENT
Start: 2021-12-01 | End: 2021-12-02 | Stop reason: SURG

## 2021-12-01 RX ORDER — SODIUM CHLORIDE, SODIUM LACTATE, POTASSIUM CHLORIDE, CALCIUM CHLORIDE 600; 310; 30; 20 MG/100ML; MG/100ML; MG/100ML; MG/100ML
INJECTION, SOLUTION INTRAVENOUS CONTINUOUS
Status: DISCONTINUED | OUTPATIENT
Start: 2021-12-01 | End: 2021-12-01 | Stop reason: HOSPADM

## 2021-12-01 RX ORDER — HYDROMORPHONE HYDROCHLORIDE 1 MG/ML
0.4 INJECTION, SOLUTION INTRAMUSCULAR; INTRAVENOUS; SUBCUTANEOUS
Status: DISCONTINUED | OUTPATIENT
Start: 2021-12-01 | End: 2021-12-01 | Stop reason: HOSPADM

## 2021-12-01 RX ORDER — OXYCODONE HCL 5 MG/5 ML
5 SOLUTION, ORAL ORAL
Status: COMPLETED | OUTPATIENT
Start: 2021-12-01 | End: 2021-12-01

## 2021-12-01 RX ORDER — HYDRALAZINE HYDROCHLORIDE 20 MG/ML
5 INJECTION INTRAMUSCULAR; INTRAVENOUS
Status: DISCONTINUED | OUTPATIENT
Start: 2021-12-01 | End: 2021-12-01 | Stop reason: HOSPADM

## 2021-12-01 RX ORDER — LIDOCAINE HYDROCHLORIDE 20 MG/ML
INJECTION, SOLUTION EPIDURAL; INFILTRATION; INTRACAUDAL; PERINEURAL PRN
Status: DISCONTINUED | OUTPATIENT
Start: 2021-12-01 | End: 2021-12-02 | Stop reason: SURG

## 2021-12-01 RX ORDER — LABETALOL HYDROCHLORIDE 5 MG/ML
5 INJECTION, SOLUTION INTRAVENOUS
Status: DISCONTINUED | OUTPATIENT
Start: 2021-12-01 | End: 2021-12-01 | Stop reason: HOSPADM

## 2021-12-01 RX ORDER — OXYCODONE HCL 5 MG/5 ML
10 SOLUTION, ORAL ORAL
Status: COMPLETED | OUTPATIENT
Start: 2021-12-01 | End: 2021-12-01

## 2021-12-01 RX ORDER — HALOPERIDOL 5 MG/ML
1 INJECTION INTRAMUSCULAR
Status: DISCONTINUED | OUTPATIENT
Start: 2021-12-01 | End: 2021-12-01 | Stop reason: HOSPADM

## 2021-12-01 RX ORDER — HYDROMORPHONE HYDROCHLORIDE 1 MG/ML
0.5 INJECTION, SOLUTION INTRAMUSCULAR; INTRAVENOUS; SUBCUTANEOUS
Status: DISCONTINUED | OUTPATIENT
Start: 2021-12-01 | End: 2021-12-01 | Stop reason: HOSPADM

## 2021-12-01 RX ORDER — HEPARIN SODIUM,PORCINE 1000/ML
VIAL (ML) INJECTION
Status: DISCONTINUED | OUTPATIENT
Start: 2021-12-01 | End: 2021-12-01 | Stop reason: HOSPADM

## 2021-12-01 RX ADMIN — SODIUM CHLORIDE, POTASSIUM CHLORIDE, SODIUM LACTATE AND CALCIUM CHLORIDE: 600; 310; 30; 20 INJECTION, SOLUTION INTRAVENOUS at 13:33

## 2021-12-01 RX ADMIN — FENTANYL CITRATE 50 MCG: 50 INJECTION, SOLUTION INTRAMUSCULAR; INTRAVENOUS at 15:29

## 2021-12-01 RX ADMIN — FENTANYL CITRATE 50 MCG: 50 INJECTION, SOLUTION INTRAMUSCULAR; INTRAVENOUS at 14:50

## 2021-12-01 RX ADMIN — SODIUM CHLORIDE: 9 INJECTION, SOLUTION INTRAVENOUS at 11:04

## 2021-12-01 RX ADMIN — PROPOFOL 120 MG: 10 INJECTION, EMULSION INTRAVENOUS at 13:44

## 2021-12-01 RX ADMIN — LIDOCAINE HYDROCHLORIDE 60 MG: 20 INJECTION, SOLUTION EPIDURAL; INFILTRATION; INTRACAUDAL at 13:44

## 2021-12-01 RX ADMIN — DEXAMETHASONE SODIUM PHOSPHATE 4 MG: 4 INJECTION, SOLUTION INTRA-ARTICULAR; INTRALESIONAL; INTRAMUSCULAR; INTRAVENOUS; SOFT TISSUE at 13:46

## 2021-12-01 RX ADMIN — OXYCODONE HYDROCHLORIDE 10 MG: 5 SOLUTION ORAL at 14:38

## 2021-12-01 RX ADMIN — FENTANYL CITRATE 50 MCG: 50 INJECTION, SOLUTION INTRAMUSCULAR; INTRAVENOUS at 13:44

## 2021-12-01 RX ADMIN — ONDANSETRON 4 MG: 2 INJECTION INTRAMUSCULAR; INTRAVENOUS at 14:20

## 2021-12-01 RX ADMIN — CEFAZOLIN 2 G: 330 INJECTION, POWDER, FOR SOLUTION INTRAMUSCULAR; INTRAVENOUS at 13:46

## 2021-12-01 ASSESSMENT — PAIN DESCRIPTION - PAIN TYPE
TYPE: SURGICAL PAIN

## 2021-12-01 ASSESSMENT — FIBROSIS 4 INDEX: FIB4 SCORE: 1.48

## 2021-12-01 NOTE — ANESTHESIA PREPROCEDURE EVALUATION
Case: [id number] Date/Time: 12/01/21 1215    Procedures:       INSERTION, CATHETER - MEDIPORT      REMOVAL, TUNNELED DIALYSIS CATHETER    Pre-op diagnosis: END STAGE RENAL DISEASE    Location: [facility] OR 09 / SURGERY [facility]    Surgeons: Víctor Berg M.D.          Relevant Problems   ANESTHESIA   (positive) DAVI (obstructive sleep apnea)      NEURO   (positive) Seizure disorder (HCC)      CARDIAC   (positive) A-V fistula ([facility])   (positive) Hypertension      GI   (positive) GERD (gastroesophageal reflux disease)         (positive) ESRD (end stage renal disease) on dialysis (HCC)      Other   (positive) Anemia in chronic kidney disease, on chronic dialysis ([facility])   (positive) Drug-seeking behavior   (positive) ESBL (extended spectrum beta-lactamase) producing bacteria infection   (positive) Lupus (systemic lupus erythematosus) ([facility])   (positive) Obesity (BMI 30-39.9)   (positive) Opioid dependence ([facility])     Anes H&P:  PAST MEDICAL HISTORY:   41 y.o. female who presents for Procedure(s):  INSERTION, CATHETER - MEDIPORT  REMOVAL, TUNNELED DIALYSIS CATHETER.  She has current and past medical problems significant for:    Past Medical History:   Diagnosis Date   • Anemia    • Arthritis     all joints,r/t lupus   • Avascular necrosis of bones of both hips ([facility]) 10/10/2016   • Blood clotting disorder ([facility])     in AV Graft   • Bowel habit changes     constipation   • Closed nondisplaced fracture of base of fifth metacarpal bone of left hand 2/1/2019   • Clostridium difficile colitis 5/3/2011   • Continuous ambulatory peritoneal dialysis status ([facility])    • Dialysis patient    • Environmental and seasonal allergies    • ESBL (extended spectrum beta-lactamase) producing bacteria infection 8/25/2014   • Fall    • Fibromyalgia 11/30/2021    6/10   • High anion gap metabolic acidosis 4/2/2011   • Hypertension    • Lupus ([facility])    • Pneumonia    • Psychiatric disorder     anxiety, depression   • Pyelonephritis 11/21/2017    • Renal failure     Dr. Najjar   • Sepsis due to pneumonia (HCC) 6/7/2018   • Status epilepticus (HCC)     last seizure        SMOKING/ALCOHOL/RECREATIONAL DRUG USE:  Social History     Tobacco Use   • Smoking status: Former Smoker     Packs/day: 0.50     Years: 18.00     Pack years: 9.00     Types: Cigarettes     Quit date: 6/13/2011     Years since quitting: 10.4   • Smokeless tobacco: Never Used   • Tobacco comment: vaping now   Vaping Use   • Vaping Use: Some days   • Substances: Flavoring   • Devices: Disposable   Substance Use Topics   • Alcohol use: No     Alcohol/week: 0.0 oz   • Drug use: No     Types: Marijuana     Social History     Substance and Sexual Activity   Drug Use No   • Types: Marijuana       PAST SURGICAL HISTORY:  Past Surgical History:   Procedure Laterality Date   • CATH PLACEMENT CAPD N/A 5/5/2021    Procedure: INSERTION, CATHETER, CAPD;  Surgeon: Víctor Berg M.D.;  Location: [facility];  Service: Vascular   • THROMBECTOMY Right 12/7/2020    Procedure: Right upper extremity arteriovenous graft thrombectomy;  Surgeon: Daniel Poole M.D.;  Location: [facility];  Service: Vascular   • CATH PLACEMENT Left 12/7/2020    Procedure: port-a-catheter removal;  Surgeon: Daniel Poole M.D.;  Location: [facility];  Service: Vascular   • GAYATHRI N/A 8/20/2019    Procedure: ECHOCARDIOGRAM, TRANSESOPHAGEAL;  Surgeon: Marta Elaine M.D.;  Location: [facility];  Service: Cardiac   • AV FISTULA REVISION Right 8/11/2018    Procedure: AV FISTULA REVISION- Graft and Thrombectomy;  Surgeon: Shabbir Ardon M.D.;  Location: [facility];  Service: General   • AV FISTULA THROMBOLYSIS Right 8/11/2018    Procedure: AV FISTULA THROMBOLYSIS;  Surgeon: Shabbir Ardon M.D.;  Location: [facility];  Service: General   • AV FISTULA CREATION Right 12/9/2017    Procedure: AV FISTULA CREATION-ARM FOR GRAFT;  Surgeon: Lesly Jansen,  M.D.;  Location: SURGERY [facility];  Service: General   • THROMBECTOMY  12/9/2017    Procedure: THROMBECTOMY;  Surgeon: Lesly Jansen M.D.;  Location: SURGERY [facility];  Service: General   • THROMBECTOMY Right 8/21/2016    Procedure: THROMBECTOMY - right AV fistula graft with grams;  Surgeon: Shabbir Ardon M.D.;  Location: SURGERY [facility];  Service:    • ANGIOPLASTY BALLOON  8/21/2016    Procedure: ANGIOPLASTY BALLOON;  Surgeon: Shabbir Ardon M.D.;  Location: SURGERY [facility];  Service:    • THROMBECTOMY Right 8/20/2016    Procedure: THROMBECTOMY AV GRAFT;  Surgeon: Shabbir Ardon M.D.;  Location: [facility];  Service:    • AV FISTULA CREATION Right 7/12/2016    Procedure: AV FISTULA CREATION WITH GRAFT BRACHIAL AXILLARY;  Surgeon: Shabbir Ardon M.D.;  Location: SURGERY [facility];  Service:    • CLOSED REDUCTION Right 7/5/2016    Procedure: CLOSED REDUCTION- Hip ;  Surgeon: Michael Holman M.D.;  Location: SURGERY [facility];  Service:    • HIP ARTHROPLASTY TOTAL Right 1/18/2016    Procedure: HIP ARTHROPLASTY TOTAL;  Surgeon: Michael Holman M.D.;  Location: [facility];  Service:    • AV FISTULA CREATION  2/3/2015    Performed by Shabbir Ardon M.D. at [facility]   • AV FISTULA CREATION  11/14/2014    Performed by Shabbir Ardon M.D. at SURGERY [facility]   • AV FISTULA CREATION  9/9/2014    Performed by Shabbir Ardon M.D. at [facility]   • CATH PLACEMENT  9/9/2014    Performed by Shabbir Ardon M.D. at SURGERY [facility]   • OTHER  5/2011    tracheostomy   • GASTROSCOPY-ENDO  4/27/2011    Performed by PALMIRA DOAN at ENDOSCOPY [facility]   • COLONOSCOPY WITH BIOPSY  4/20/2011    Performed by ALCIRA CRUZ at ENDOSCOPY [facility]   • GASTROSCOPY-ENDO  4/18/2011    Performed by ALCIRA CRUZ at [facility]COPY [facility]   • GASTROSCOPY-ENDO  4/10/2011     "Performed by SYED JURADO at ENDOSCOPY [facility]   • SCLEROTHERAPHY  4/10/2011    Performed by SYED JURADO at ENDOSCOPY [facility]   • OTHER ABDOMINAL SURGERY      kidney biopsy   • TRACHEOSTOMY         ALLERGIES:   Allergies   Allergen Reactions   • Lorazepam [Ativan] Anxiety and Unspecified     Patient becomes severely paranoid and agitated, hallucinates   • Morphine Itching and Swelling     Tolerates Dilaudid  \"Swelling in mouth\"       MEDICATIONS:  No current facility-administered medications on file prior to encounter.     Current Outpatient Medications on File Prior to Encounter   Medication Sig Dispense Refill   • Oxycodone HCl 20 MG Tab Take 1 Tablet by mouth 4 times a day as needed (pain) for up to 28 days. 100 Tablet 0   • pregabalin (LYRICA) 50 MG capsule Take 1 Capsule by mouth 2 times a day for 84 days. 60 Capsule 2   • lisinopril (PRINIVIL) 20 MG Tab Take 20 mg by mouth at bedtime. Indications: High Blood Pressure Disorder     • lactulose 10 GM/15ML Solution Take 20 g by mouth 1 time a day as needed (Constipation). 30 mL = 20 mg.     • traZODone (DESYREL) 50 MG Tab TAKE 1 TABLET BY MOUTH EVERY NIGHT AT BEDTIME (Patient taking differently: Take 50 mg by mouth at bedtime.) 90 tablet 1   • tizanidine (ZANAFLEX) 4 MG Tab TAKE 2 TABLETS BY MOUTH AT BEDTIME AS NEEDED FOR MUSCLE SPASMS (Patient taking differently: Take 8 mg by mouth at bedtime as needed.) 180 tablet 3   • amLODIPine (NORVASC) 10 MG Tab Take 1 tablet by mouth every day. 90 tablet 3   • lacosamide (VIMPAT) 100 MG Tab tablet Take 100 mg by mouth 2 times a day. Indications: seizures         LABS:  Lab Results   Component Value Date/Time    HEMOGLOBIN 10.4 (L) 11/05/2021 0403    HEMATOCRIT 32.5 (L) 11/05/2021 0403    WBC 2.7 (L) 11/05/2021 0403     Lab Results   Component Value Date/Time    SODIUM 141 12/01/2021 1101    POTASSIUM 4.6 12/01/2021 1101    CHLORIDE 98 12/01/2021 1101    CO2 25 12/01/2021 1101    GLUCOSE 87 " 12/01/2021 1101    BUN 68 (H) 12/01/2021 1101    CALCIUM 8.1 (L) 12/01/2021 1101       COVID-19 Status: Negative      PREVIOUS ANESTHETICS: See EMR  __________________________________________      Physical Exam    Airway   Mallampati: II  TM distance: >3 FB  Neck ROM: full       Cardiovascular - normal exam  Rhythm: regular  Rate: normal  (-) murmur     Dental - normal exam      Very poor dentition   Pulmonary - normal exam  Breath sounds clear to auscultation     Abdominal   (+) obese     Neurological - normal exam                 Anesthesia Plan    ASA 3   ASA physical status 3 criteria: ESRD undergoing regularly scheduled dialysis    Plan - general       Airway plan will be LMA          Induction: intravenous    Postoperative Plan: Postoperative administration of opioids is intended.    Pertinent diagnostic labs and testing reviewed    Informed Consent:    Anesthetic plan and risks discussed with patient.    Use of blood products discussed with: patient whom consented to blood products.

## 2021-12-01 NOTE — OR NURSING
Awake, alert and tolerating PO fluids.  Medicated for pain.  Vitals stable.  On monitors with alarms audible.    Called family member and left voicemail. .

## 2021-12-01 NOTE — OP REPORT
-------------------------------------------------    Vascular Surgery Operative Note      DATE OF SERVICE:  12/1/2021      Debby Carrizales  1980  [id number]    PREOPERATIVE DIAGNOSES:  - Poor IV access  - ESRD    POSTOPERATIVE DIAGNOSES:  - Poor IV access  - ESRD    PROCEDURES PERFORMED:  1) Ultrasound-guided access of the rightleft internal jugular vein  2) Insertion of left internal jugular single lumen mediport catheter (PowerPort) with fluoroscopy    SURGEON:  Víctor Berg MD    ASSISTANT:  None.    ANESTHESIA:  Sedation plus local anesthetic    BLOOD LOSS:  minimal    DISPOSITION:  Patient was extubated and sent to recovery in stable condition.    DESCRIPTION OF PROCEDURE:  Following informed consent, patient was placed supine on the operating table and general anesthesia was administered.  The existing right-IJ tunneled dialysis catheter was removed directly with pressure, no dissection needed.  Then, the patient's neck and chest were prepped and draped sterile.  Surgical time-out was called to identify the correct patient, procedure and equipment; everyone was in agreement.  Patient received preoperative prophylactic antibiotics.    1% Lidocaine was used to anesthetize the surrounding skin area. The jugular vein was accessed percutaneously with ultrasound guidance.  The J-wire passed into the vein with ease.  A small puncture incision was made at the insertion site, the tissue was bluntly spread, and the dilator with peel-away sheath was placed.      The anterior chest was anesthetized with lidocaine and then a 3cm transverse incision was made and a port pocket was created in the subcutaneous tissue.  The catheter was tunneled to the neck incision and then placed through the peel away sheath and positioned under fluoroscopy.      3-0 vicryl sutures were placed at three points around the pocket and brought through the port cannister and tagged. The catheter was trimmed and connected  to the port.  The anchoring sutures were tied, pulling the port into the pocket.  The port was accessed with a posey needle, aspirated and flushed well, and then locked with concentrated heparin.    At this point, hemostasis was achieved.  The incisions were reapproximated with multiple layers of absorbable suture and skin glue was then applied.  Patient tolerated the procedure well.  All counts were correct.  Patient was sent to recovery in stable condition.    Víctor Berg MD  General and Vascular Surgery  [facility]  [phone]

## 2021-12-01 NOTE — H&P
-------------------------------------------------    General Surgery H&P  -------------------------------------------------------------------------------------------------  Date: 12/1/2021    Surgeon: Víctor Berg M.D. - [facility]  -------------------------------------------------------------------------------------------------    HPI:  This is a 41 y.o. female who is presenting today for elective surgery.  No specific complaints at this time. Questions addressed.      Past Medical History:   Diagnosis Date   • Anemia    • Arthritis     all joints,r/t lupus   • Avascular necrosis of bones of both hips (Formerly [facility]) 10/10/2016   • Blood clotting disorder (Formerly [facility])     in AV Graft   • Bowel habit changes     constipation   • Closed nondisplaced fracture of base of fifth metacarpal bone of left hand 2/1/2019   • Clostridium difficile colitis 5/3/2011   • Continuous ambulatory peritoneal dialysis status (Formerly [facility])    • Dialysis patient    • Environmental and seasonal allergies    • ESBL (extended spectrum beta-lactamase) producing bacteria infection 8/25/2014   • Fall    • Fibromyalgia 11/30/2021    6/10   • High anion gap metabolic acidosis 4/2/2011   • Hypertension    • Lupus (Formerly [facility])    • Pneumonia    • Psychiatric disorder     anxiety, depression   • Pyelonephritis 11/21/2017   • Renal failure     Dr. Najjar   • Sepsis due to pneumonia (Formerly [facility]) 6/7/2018   • Status epilepticus (Formerly [facility])     last seizure        Past Surgical History:   Procedure Laterality Date   • CATH PLACEMENT CAPD N/A 5/5/2021    Procedure: INSERTION, CATHETER, CAPD;  Surgeon: Víctor Berg M.D.;  Location: SURGERY [facility];  Service: Vascular   • THROMBECTOMY Right 12/7/2020    Procedure: Right upper extremity arteriovenous graft thrombectomy;  Surgeon: Daniel Poole M.D.;  Location: SURGERY [facility];  Service: Vascular   • CATH PLACEMENT Left 12/7/2020    Procedure: port-a-catheter removal;  Surgeon: Daniel Poole M.D.;   Location: [facility];  Service: Vascular   • GAYATHRI N/A 8/20/2019    Procedure: ECHOCARDIOGRAM, TRANSESOPHAGEAL;  Surgeon: Marta Elaine M.D.;  Location: [facility];  Service: Cardiac   • AV FISTULA REVISION Right 8/11/2018    Procedure: AV FISTULA REVISION- Graft and Thrombectomy;  Surgeon: Shabbir Ardon M.D.;  Location: [facility];  Service: General   • AV FISTULA THROMBOLYSIS Right 8/11/2018    Procedure: AV FISTULA THROMBOLYSIS;  Surgeon: Shabbir Ardon M.D.;  Location: [facility];  Service: General   • AV FISTULA CREATION Right 12/9/2017    Procedure: AV FISTULA CREATION-ARM FOR GRAFT;  Surgeon: Lesly Jansen M.D.;  Location: [facility];  Service: General   • THROMBECTOMY  12/9/2017    Procedure: THROMBECTOMY;  Surgeon: Lesly Jansen M.D.;  Location: [facility];  Service: General   • THROMBECTOMY Right 8/21/2016    Procedure: THROMBECTOMY - right AV fistula graft with grams;  Surgeon: Shabbir Ardon M.D.;  Location: [facility];  Service:    • ANGIOPLASTY BALLOON  8/21/2016    Procedure: ANGIOPLASTY BALLOON;  Surgeon: Shabbir Ardon M.D.;  Location: [facility];  Service:    • THROMBECTOMY Right 8/20/2016    Procedure: THROMBECTOMY AV GRAFT;  Surgeon: Shabbir Ardon M.D.;  Location: [facility];  Service:    • AV FISTULA CREATION Right 7/12/2016    Procedure: AV FISTULA CREATION WITH GRAFT BRACHIAL AXILLARY;  Surgeon: Shabbir Ardon M.D.;  Location: [facility];  Service:    • CLOSED REDUCTION Right 7/5/2016    Procedure: CLOSED REDUCTION- Hip ;  Surgeon: Michael Holman M.D.;  Location: [facility];  Service:    • HIP ARTHROPLASTY TOTAL Right 1/18/2016    Procedure: HIP ARTHROPLASTY TOTAL;  Surgeon: Michael Holman M.D.;  Location: [facility];  Service:    • AV FISTULA CREATION  2/3/2015    Performed by Shabbir Ardon M.D. at  SURGERY [facility]   • AV FISTULA CREATION  11/14/2014    Performed by Shabbir Ardon M.D. at SURGERY [facility]   • AV FISTULA CREATION  9/9/2014    Performed by Shabbir Ardon M.D. at SURGERY [facility]   • CATH PLACEMENT  9/9/2014    Performed by Shabbir Ardon M.D. at SURGERY [facility]   • OTHER  5/2011    tracheostomy   • GASTROSCOPY-ENDO  4/27/2011    Performed by PALMIRA DOAN at ENDOSCOPY [facility]   • COLONOSCOPY WITH BIOPSY  4/20/2011    Performed by ALCIRA CRUZ at ENDOSCOPY [facility]   • GASTROSCOPY-ENDO  4/18/2011    Performed by ALCIRA CRUZ at ENDOSCOPY [facility]   • GASTROSCOPY-ENDO  4/10/2011    Performed by SYED JURADO at ENDOSCOPY [facility] ORS   • SCLEROTHERAPHY  4/10/2011    Performed by SYED JURADO at ENDOSCOPY [facility]   • OTHER ABDOMINAL SURGERY      kidney biopsy   • TRACHEOSTOMY         No current facility-administered medications for this encounter.       Social History     Socioeconomic History   • Marital status: Single     Spouse name: Not on file   • Number of children: Not on file   • Years of education: Not on file   • Highest education level: Not on file   Occupational History   • Not on file   Tobacco Use   • Smoking status: Former Smoker     Packs/day: 0.50     Years: 18.00     Pack years: 9.00     Types: Cigarettes     Quit date: 6/13/2011     Years since quitting: 10.4   • Smokeless tobacco: Never Used   • Tobacco comment: vaping now   Vaping Use   • Vaping Use: Some days   • Substances: Flavoring   • Devices: Disposable   Substance and Sexual Activity   • Alcohol use: No     Alcohol/week: 0.0 oz   • Drug use: No     Types: Marijuana   • Sexual activity: Not Currently     Partners: Male   Other Topics Concern   •  Service No   • Blood Transfusions Yes   • Caffeine Concern No   • Occupational Exposure No   • Hobby Hazards No   • Sleep Concern Yes   • Stress Concern Yes   • Weight  "Concern Yes   • Special Diet Yes   • Back Care No   • Exercise Yes   • Bike Helmet No   • Seat Belt Yes   • Self-Exams Yes   Social History Narrative   • Not on file     Social Determinants of Health     Financial Resource Strain: Medium Risk   • Difficulty of Paying Living Expenses: Somewhat hard   Food Insecurity: Food Insecurity Present   • Worried About Running Out of Food in the Last Year: Often true   • Ran Out of Food in the Last Year: Often true   Transportation Needs: Unmet Transportation Needs   • Lack of Transportation (Medical): Yes   • Lack of Transportation (Non-Medical): Yes   Physical Activity:    • Days of Exercise per Week: Not on file   • Minutes of Exercise per Session: Not on file   Stress:    • Feeling of Stress : Not on file   Social Connections:    • Frequency of Communication with Friends and Family: Not on file   • Frequency of Social Gatherings with Friends and Family: Not on file   • Attends Religion Services: Not on file   • Active Member of Clubs or Organizations: Not on file   • Attends Club or Organization Meetings: Not on file   • Marital Status: Not on file   Intimate Partner Violence:    • Fear of Current or Ex-Partner: Not on file   • Emotionally Abused: Not on file   • Physically Abused: Not on file   • Sexually Abused: Not on file   Housing Stability:    • Unable to Pay for Housing in the Last Year: Not on file   • Number of Places Lived in the Last Year: Not on file   • Unstable Housing in the Last Year: Not on file       Family History   Problem Relation Age of Onset   • Heart Disease Mother    • Cancer Father        Allergies:  Lorazepam [ativan] and Morphine    Review of Systems:  Noncontributory except as per HPI    Physical Exam:  BP (!) 163/92   Pulse 92   Temp 36.4 °C (97.6 °F) (Temporal)   Resp 18   Ht 1.651 m (5' 5\")   Wt 82.7 kg (182 lb 5.1 oz)   SpO2 97%     Constitutional: Alert, oriented, no acute distress  HEENT:  Normocephalic and atraumatic, " EOMI  Neck:   Supple, no JVD,   Cardiovascular: Regular rate and rhythm,   Pulmonary:  Good air entry bilaterally, Right-sided permacath  Abdominal:  Soft, non-tender, non-distended     Aortic impulse not widened  Musculoskeletal: No edema, no tenderness  Neurological:  CN II-XII grossly intact, no focal deficits  Skin:   Skin is warm and dry. No rash noted.  Psychiatric:  Normal mood and affect.    Labs:      Recent Labs     12/01/21  1101   SODIUM 141   POTASSIUM 4.6   CHLORIDE 98   CO2 25   GLUCOSE 87   BUN 68*   CREATININE 14.71*   CALCIUM 8.1*               Assessment/Plan:  This is a 41 y.o. female here today for elective surgery: removal of right IJ permacath, insertion of left-sided mediport.    I explained the details of the operation, alternatives, and potential risks, including but not limited to bleeding, infection, and risks of anesthesia.  All questions were answered. Patient understands and agrees to proceed.      Víctor Berg MD  [facility] (General and Vascular Surgery)  Cell: [phone] (text or call is fine, if you don't reach me please try my office)  Office: [phone]    12/1/2021    1:15 PM  ___________________________________________________________________  Patient:Debby Carrizales   MRN:[id number]   CSN:[id number]

## 2021-12-01 NOTE — OR NURSING
Awake, alert and tolerating PO fluids.  Medicated for pain.  Vitals stable.  On monitors with alarms audible.    Called daughter to give update

## 2021-12-01 NOTE — ANESTHESIA PROCEDURE NOTES
Airway    Date/Time: 12/1/2021 1:45 PM  Performed by: Gary Quinonez M.D.  Authorized by: Gary Quinonez M.D.     Location:  OR  Urgency:  Elective  Difficult Airway: No    Indications for Airway Management:  Anesthesia      Spontaneous Ventilation: absent    Sedation Level:  Deep  Preoxygenated: Yes    Mask Difficulty Assessment:  0 - not attempted  Final Airway Type:  Supraglottic airway  Final Supraglottic Airway:  Standard LMA    SGA Size:  4  Number of Attempts at Approach:  1

## 2021-12-02 NOTE — ANESTHESIA TIME REPORT
Anesthesia Start and Stop Event Times     Date Time Event    12/1/2021 1333 Ready for Procedure     1337 Anesthesia Start     1434 Anesthesia Stop        Responsible Staff  12/01/21    Name Role Begin End    Gary Quinonez M.D. Anesth 1337 1434        Preop Diagnosis (Free Text):  Pre-op Diagnosis     END STAGE RENAL DISEASE        Preop Diagnosis (Codes):    Premium Reason  Non-Premium    Comments:

## 2021-12-02 NOTE — DISCHARGE INSTRUCTIONS
ACTIVITY: Rest and take it easy for the first 24 hours.  A responsible adult is recommended to remain with you during that time.  It is normal to feel sleepy.  We encourage you to not do anything that requires balance, judgment or coordination.    MILD FLU-LIKE SYMPTOMS ARE NORMAL. YOU MAY EXPERIENCE GENERALIZED MUSCLE ACHES, THROAT IRRITATION, HEADACHE AND/OR SOME NAUSEA.    FOR 24 HOURS DO NOT:  Drive, operate machinery or run household appliances.  Drink beer or alcoholic beverages.   Make important decisions or sign legal documents.    SPECIAL INSTRUCTIONS: Discharge Instructions    Procedure: Port insertion    1. ACTIVITIES: Upon discharge from the hospital, the day of surgery it is requested that you do no significant physical activity and no driving as you have had sedation. The day after surgery, you may resume activities of daily living, but no strenuous activities or heavy lifting (greater than 15 pounds) for 1 week.    2. DRIVING: You may drive whenever you are off pain medications and are able to perform the activities needed to drive, i.e. turning, bending, twisting, etc.     3. WOUND: It is not unusual for patients to experience swelling and even bruising, as well as a small amount of drainage from the incision. This is normal and will resolve over the next few days. The incision is covered with skin glue and is waterproof. You can bathe normally starting the day after the operation. The glue will peel off after several days.    4. ICE: please use ice on the wound to decrease the swelling for the first 24 hours and then discontinue. Apply ice for 20 minutes and then remove for 20 minutes, alternating while awake.    5. BATHING: Incision is covered with skin glue.  You do not need to cover it. You can shower starting 1 day after the operation.   Avoid submerging the incision in water (tub, bath, pool) for at least a week.     6. PAIN MEDICATION: You will be given a prescription for pain medication at  discharge. Please take these as directed. It is important to remember not to take medications on an empty stomach as this may cause nausea.     7. BOWEL FUNCTION: After surgery, it is not uncommon for patients to experience constipation. This is due to decreasing activity levels as well as pain medications. You may wish to use a stool softener beginning immediately after surgery, and you may or may not need to use a laxative (Milk of Magnesia, Ex-lax; Senokot, etc.) as well.     8 .CALL IF YOU HAVE: (1) Fevers to more than 101.5 F, (2) Unusual or excessive pain, (3) Drainage or fluid from incision that may be foul smelling, increased tenderness or soreness at the wound or the wound edges are no longer together, redness or swelling at the incision site. Please do not hesitate to call with any other questions.   If you have any additional questions, please do not hesitate to call the office and speak to either myself or the physician on call.     Office addresses:   [address] [place] [id number]  [phone]    Víctor Berg MD  [facility]  [phone]        DIET: To avoid nausea, slowly advance diet as tolerated, avoiding spicy or greasy foods for the first day.  Add more substantial food to your diet according to your physician's instructions. INCREASE FLUIDS AND FIBER TO AVOID CONSTIPATION.            You should CALL YOUR PHYSICIAN if you develop:  Fever greater than 101 degrees F.  Pain not relieved by medication, or persistent nausea or vomiting.  Excessive bleeding (blood soaking through dressing) or unexpected drainage from the wound.  Extreme redness or swelling around the incision site, drainage of pus or foul smelling drainage.  Inability to urinate or empty your bladder within 8 hours.  Problems with breathing or chest pain.    You should call [phone] if you develop problems with breathing or chest pain.  If you are unable to contact your doctor or surgical center, you should go to  the nearest emergency room or urgent care center.  Physician's telephone #: [phone]    If any questions arise, call your doctor.  If your doctor is not available, please feel free to call the Surgical Center at [phone]. The Contact Center is open Monday through Friday 7AM to 5PM and may speak to a nurse at [phone], or toll free at [phone].     A registered nurse may call you a few days after your surgery to see how you are doing after your procedure.    MEDICATIONS: Resume taking daily medication.  Take prescribed pain medication with food.  If no medication is prescribed, you may take non-aspirin pain medication if needed.  PAIN MEDICATION CAN BE VERY CONSTIPATING.  Take a stool softener or laxative such as senokot, pericolace, or milk of magnesia if needed.  Last pain medication given at 2:38    If your physician has prescribed pain medication that includes Acetaminophen (Tylenol), do not take additional Acetaminophen (Tylenol) while taking the prescribed medication.    Depression / Suicide Risk    As you are discharged from this [facility] facility, it is important to learn how to keep safe from harming yourself.    Recognize the warning signs:  · Abrupt changes in personality, positive or negative- including increase in energy   · Giving away possessions  · Change in eating patterns- significant weight changes-  positive or negative  · Change in sleeping patterns- unable to sleep or sleeping all the time   · Unwillingness or inability to communicate  · Depression  · Unusual sadness, discouragement and loneliness  · Talk of wanting to die  · Neglect of personal appearance   · Rebelliousness- reckless behavior  · Withdrawal from people/activities they love  · Confusion- inability to concentrate     If you or a loved one observes any of these behaviors or has concerns about self-harm, here's what you can do:  · Talk about it- your feelings and reasons for harming yourself  · Remove any  means that you might use to hurt yourself (examples: pills, rope, extension cords, firearm)  · Get professional help from the community (Mental Health, Substance Abuse, psychological counseling)  · Do not be alone:Call your Safe Contact- someone whom you trust who will be there for you.  · Call your local CRISIS HOTLINE 938-3478 or 472-797-1597  · Call your local Children's Mobile Crisis Response Team [place] (382) 074-4330 or www.[place].com  · Call the toll free National Suicide Prevention Hotlines   · National Suicide Prevention Lifeline 340-686-QEHL (7353)  · National Hope Line Network 800-SUICIDE (994-2577)

## 2021-12-02 NOTE — ANESTHESIA POSTPROCEDURE EVALUATION
Patient: Debby Carrizales    Procedure Summary     Date: 12/01/21 Room / Location: [facility] 09 / SURGERY [facility]    Anesthesia Start: 1337 Anesthesia Stop: 1434    Procedures:       INSERTION, CATHETER - MEDIPORT (Left Chest)      REMOVAL, TUNNELED DIALYSIS CATHETER (Right Chest) Diagnosis: (END STAGE RENAL DISEASE)    Surgeons: Víctor Berg M.D. Responsible Provider: Gary Quinonez M.D.    Anesthesia Type: general ASA Status: 3          Final Anesthesia Type: general  Last vitals  BP   Blood Pressure: 117/64    Temp   (!) 9 °C (48.2 °F)    Pulse   92   Resp   18    SpO2   96 %      Anesthesia Post Evaluation    Patient location during evaluation: PACU  Patient participation: complete - patient participated  Level of consciousness: sleepy but conscious    Airway patency: patent  Anesthetic complications: no  Cardiovascular status: hemodynamically stable  Respiratory status: acceptable  Hydration status: balanced    PONV: none          No complications documented.     Nurse Pain Score: 3 (NPRS)

## 2021-12-02 NOTE — OR NURSING
Pt's VSS; denies N/V; states pain is at tolerable level. Dressing CDI to L chest and RT chest pt awake and alert     1714  D/c orders received. IV dc'd. Pt changed into clothing with assistance. Discharge instructions given as well as pain management handout; pt and family verbalized understanding and questions answered. Patient states ready to d/c home.  Pt dc'd in w/c with CNA.

## 2021-12-14 ENCOUNTER — PATIENT MESSAGE (OUTPATIENT)
Dept: PHYSICAL MEDICINE AND REHAB | Facility: MEDICAL CENTER | Age: 41
End: 2021-12-14

## 2021-12-14 DIAGNOSIS — G89.29 CHRONIC BILATERAL LOW BACK PAIN WITHOUT SCIATICA: ICD-10-CM

## 2021-12-14 DIAGNOSIS — G62.9 PERIPHERAL POLYNEUROPATHY: ICD-10-CM

## 2021-12-14 DIAGNOSIS — M87.052 AVASCULAR NECROSIS OF BONE OF HIP, LEFT (HCC): ICD-10-CM

## 2021-12-14 DIAGNOSIS — M54.50 CHRONIC BILATERAL LOW BACK PAIN WITHOUT SCIATICA: ICD-10-CM

## 2021-12-14 RX ORDER — OXYCODONE HYDROCHLORIDE 20 MG/1
1 TABLET ORAL 4 TIMES DAILY PRN
Qty: 98 TABLET | Refills: 0 | Status: SHIPPED | OUTPATIENT
Start: 2021-12-14 | End: 2022-01-07 | Stop reason: SDUPTHER

## 2021-12-15 NOTE — TELEPHONE ENCOUNTER
"Can you please file the MIDAS?  This is from the cancellation.  I will place the order.  Thank you.    \"Discontinued by: Kasia Heredia on 12/1/2021 10:47\""

## 2022-01-07 ENCOUNTER — OFFICE VISIT (OUTPATIENT)
Dept: PHYSICAL MEDICINE AND REHAB | Facility: MEDICAL CENTER | Age: 42
End: 2022-01-07
Payer: MEDICARE

## 2022-01-07 VITALS
DIASTOLIC BLOOD PRESSURE: 90 MMHG | TEMPERATURE: 97.8 F | HEIGHT: 65 IN | BODY MASS INDEX: 32.1 KG/M2 | OXYGEN SATURATION: 96 % | SYSTOLIC BLOOD PRESSURE: 138 MMHG | WEIGHT: 192.68 LBS | HEART RATE: 95 BPM

## 2022-01-07 DIAGNOSIS — M54.50 CHRONIC BILATERAL LOW BACK PAIN WITHOUT SCIATICA: ICD-10-CM

## 2022-01-07 DIAGNOSIS — M87.052 AVASCULAR NECROSIS OF BONE OF HIP, LEFT (HCC): ICD-10-CM

## 2022-01-07 DIAGNOSIS — N18.6 ESRD (END STAGE RENAL DISEASE) ON DIALYSIS (HCC): ICD-10-CM

## 2022-01-07 DIAGNOSIS — G62.9 PERIPHERAL POLYNEUROPATHY: ICD-10-CM

## 2022-01-07 DIAGNOSIS — G89.29 CHRONIC BILATERAL LOW BACK PAIN WITHOUT SCIATICA: ICD-10-CM

## 2022-01-07 DIAGNOSIS — F11.90 CHRONIC, CONTINUOUS USE OF OPIOIDS: ICD-10-CM

## 2022-01-07 DIAGNOSIS — M79.7 FIBROMYALGIA: ICD-10-CM

## 2022-01-07 DIAGNOSIS — Z99.2 ESRD (END STAGE RENAL DISEASE) ON DIALYSIS (HCC): ICD-10-CM

## 2022-01-07 PROCEDURE — 99214 OFFICE O/P EST MOD 30 MIN: CPT | Performed by: PHYSICAL MEDICINE & REHABILITATION

## 2022-01-07 RX ORDER — EPOETIN ALFA 20000 [IU]/ML
SOLUTION INTRAVENOUS; SUBCUTANEOUS
Status: ON HOLD | COMMUNITY
Start: 2021-11-18 | End: 2022-02-05

## 2022-01-07 RX ORDER — PREGABALIN 50 MG/1
50 CAPSULE ORAL 2 TIMES DAILY
Qty: 60 CAPSULE | Refills: 1 | Status: SHIPPED | OUTPATIENT
Start: 2022-02-12 | End: 2022-03-01 | Stop reason: SDUPTHER

## 2022-01-07 RX ORDER — OXYCODONE HYDROCHLORIDE 20 MG/1
1 TABLET ORAL 4 TIMES DAILY PRN
Qty: 98 TABLET | Refills: 0 | Status: SHIPPED | OUTPATIENT
Start: 2022-02-08 | End: 2022-03-01 | Stop reason: SDUPTHER

## 2022-01-07 RX ORDER — OXYCODONE HYDROCHLORIDE 20 MG/1
1 TABLET ORAL 4 TIMES DAILY PRN
Qty: 105 TABLET | Refills: 0 | Status: SHIPPED | OUTPATIENT
Start: 2022-01-09 | End: 2022-02-08

## 2022-01-07 ASSESSMENT — FIBROSIS 4 INDEX: FIB4 SCORE: 1.48

## 2022-01-07 ASSESSMENT — PATIENT HEALTH QUESTIONNAIRE - PHQ9: CLINICAL INTERPRETATION OF PHQ2 SCORE: 0

## 2022-01-07 ASSESSMENT — PAIN SCALES - GENERAL: PAINLEVEL: 6=MODERATE PAIN

## 2022-01-07 NOTE — PROGRESS NOTES
Follow up patient note  Pain Medicine, Interventional spine and sports physiatry, Physical medicine rehabilitation      Chief complaint:   Diffuse joint and body pain, low back, hips and legs    HISTORY      HPI  Patient identification/Interval history:   Debby Carrizales 41 y.o. female who has chronic pain related to rheumatologic disease, peripheral neuropathy and AVN hips, lupus.      Since her last visit, Debby reports that she had surgical management with removal of her catheter and port was replaced.  This increased pain and she reports that she took a few more oxycodone.  She did get a script for norco 5/325, but did not take them.  She did overuse her oxycodone after the surgery.  Typically had been taking 3/day and rarely a 4th.  She has enough medication for today and tomorrow, so has used about 6-7 additional pills since the last visit.    When she went on vacation, she had swelling in her legs that seemed to increase.  She continues PD and reports that her Nephrologist is aware.  They are working on fluid management.    No new hospitalizations.    Intermittent left hip pain, seems weather-related, she thinks.  This is not daily now, which is improved.    Pain is a 6-7/10 on the NRS.     She has been continuing her daily activities as tolerated, doing stretching exercises at home.  Bowel movements are regular.  She is using lactulose and stool softeners.  Lyrica 50mg po bid, which has been stable.      ORT: 3  PHQ-9:0    Review of records:   Hospital discharge report noted from 08/12/2019-08/21/2019 admission.  She was admitted for a near syncopal episode.  Cardiac evaluation was suspicious for endocarditis and she was started on empiric IV abx.  GAYATHRI demonstrate no endocarditis, but did show that her venous catheter was pushing through the tricuspid valve.  Cultures were negative.  Dr. Jansen, vascular surgery, was consulted and they discussed follow-up plans for port management and this will be  planned after discharge.    ROS   Unchanged except as in HPI     Red Flags :   Chills, Sweats:No fevers, chills and night sweats.  No fevers lately  Involuntary Weight Loss: Denies  Bowel/Bladder Incontinence: Denies  Saddle Anesthesia: Denies    PMHx:   Past Medical History:   Diagnosis Date   • Anemia    • Arthritis     all joints,r/t lupus   • Avascular necrosis of bones of both hips ([facility]) 10/10/2016   • Blood clotting disorder ([facility])     in AV Graft   • Bowel habit changes     constipation   • Closed nondisplaced fracture of base of fifth metacarpal bone of left hand 2/1/2019   • Clostridium difficile colitis 5/3/2011   • Continuous ambulatory peritoneal dialysis status ([facility])    • Dialysis patient    • Environmental and seasonal allergies    • ESBL (extended spectrum beta-lactamase) producing bacteria infection 8/25/2014   • Fall    • Fibromyalgia 11/30/2021    6/10   • High anion gap metabolic acidosis 4/2/2011   • Hypertension    • Lupus ([facility])    • Pneumonia    • Psychiatric disorder     anxiety, depression   • Pyelonephritis 11/21/2017   • Renal failure     Dr. Najjar   • Sepsis due to pneumonia ([facility]) 6/7/2018   • Status epilepticus ([facility])     last seizure        PSHx:   Past Surgical History:   Procedure Laterality Date   • RI REMOVAL TUNNELED CV CATH Right 12/1/2021    Procedure: REMOVAL, TUNNELED DIALYSIS CATHETER;  Surgeon: Víctor Berg M.D.;  Location: [facility];  Service: Vascular   • CATH PLACEMENT Left 12/1/2021    Procedure: INSERTION, CATHETER - MEDIPORT;  Surgeon: Víctor Berg M.D.;  Location: [facility];  Service: Vascular   • CATH PLACEMENT CAPD N/A 5/5/2021    Procedure: INSERTION, CATHETER, CAPD;  Surgeon: Víctor Berg M.D.;  Location: SUR[facility]ERY [facility];  Service: Vascular   • THROMBECTOMY Right 12/7/2020    Procedure: Right upper extremity arteriovenous graft thrombectomy;  Surgeon: Daniel Poole M.D.;  Location: [facility];   Service: Vascular   • CATH PLACEMENT Left 12/7/2020    Procedure: port-a-catheter removal;  Surgeon: Daniel Poole M.D.;  Location: [facility];  Service: Vascular   • GAYATHRI N/A 8/20/2019    Procedure: ECHOCARDIOGRAM, TRANSESOPHAGEAL;  Surgeon: Marta Elaine M.D.;  Location: [facility];  Service: Cardiac   • AV FISTULA REVISION Right 8/11/2018    Procedure: AV FISTULA REVISION- Graft and Thrombectomy;  Surgeon: Shabbir Ardon M.D.;  Location: [facility];  Service: General   • AV FISTULA THROMBOLYSIS Right 8/11/2018    Procedure: AV FISTULA THROMBOLYSIS;  Surgeon: Shabbir Ardon M.D.;  Location: [facility];  Service: General   • AV FISTULA CREATION Right 12/9/2017    Procedure: AV FISTULA CREATION-ARM FOR GRAFT;  Surgeon: Lesly Jansen M.D.;  Location: [facility];  Service: General   • THROMBECTOMY  12/9/2017    Procedure: THROMBECTOMY;  Surgeon: Lesly Jansen M.D.;  Location: [facility];  Service: General   • THROMBECTOMY Right 8/21/2016    Procedure: THROMBECTOMY - right AV fistula graft with grams;  Surgeon: Shabbir Ardon M.D.;  Location: [facility];  Service:    • ANGIOPLASTY BALLOON  8/21/2016    Procedure: ANGIOPLASTY BALLOON;  Surgeon: Shabbir Ardon M.D.;  Location: [facility];  Service:    • THROMBECTOMY Right 8/20/2016    Procedure: THROMBECTOMY AV GRAFT;  Surgeon: Shabbir Ardon M.D.;  Location: [facility];  Service:    • AV FISTULA CREATION Right 7/12/2016    Procedure: AV FISTULA CREATION WITH GRAFT BRACHIAL AXILLARY;  Surgeon: Shabbir Ardon M.D.;  Location: [facility];  Service:    • CLOSED REDUCTION Right 7/5/2016    Procedure: CLOSED REDUCTION- Hip ;  Surgeon: Michael Holman M.D.;  Location: [facility];  Service:    • HIP ARTHROPLASTY TOTAL Right 1/18/2016    Procedure: HIP ARTHROPLASTY TOTAL;  Surgeon: Michael Holman M.D.;   Location: SURGERY [facility];  Service:    • AV FISTULA CREATION  2/3/2015    Performed by Shabbir Ardon M.D. at SURGERY [facility]   • AV FISTULA CREATION  11/14/2014    Performed by Shabbir Ardon M.D. at SURGERY [facility]   • AV FISTULA CREATION  9/9/2014    Performed by Shabbir Ardon M.D. at SURGERY [facility]   • CATH PLACEMENT  9/9/2014    Performed by Shabbir Ardon M.D. at SURGERY [facility]   • OTHER  5/2011    tracheostomy   • GASTROSCOPY-ENDO  4/27/2011    Performed by PALMIRA DOAN at ENDOSCOPY [facility]   • COLONOSCOPY WITH BIOPSY  4/20/2011    Performed by ALCIRA CRUZ at ENDOSCOPY [facility]   • GASTROSCOPY-ENDO  4/18/2011    Performed by ALCIRA CRUZ at ENDOSCOPY [facility]   • GASTROSCOPY-ENDO  4/10/2011    Performed by SYED JURADO at ENDOSCOPY [facility]   • SCLEROTHERAPHY  4/10/2011    Performed by SYED JURADO at ENDOSCOPY [facility]   • OTHER ABDOMINAL SURGERY      kidney biopsy   • TRACHEOSTOMY         Family history   Family History   Problem Relation Age of Onset   • Heart Disease Mother    • Cancer Father        Medications:   Outpatient Medications Marked as Taking for the 1/7/22 encounter (Office Visit) with Quinn Chandler M.D.   Medication Sig Dispense Refill   • [START ON 2/8/2022] Oxycodone HCl 20 MG Tab Take 1 Tablet by mouth 4 times a day as needed (pain) for up to 28 days. 98 Tablet 0   • Oxycodone HCl 20 MG Tab Take 1 Tablet by mouth 4 times a day as needed (pain) for up to 30 days. 105 Tablet 0   • [START ON 2/12/2022] pregabalin (LYRICA) 50 MG capsule Take 1 Capsule by mouth 2 times a day for 30 days. 60 Capsule 1   • RENVELA 800 MG Tab tablet TAKE 4 TABLETS BY MOUTH THREE TIMES DAILY WITH MEALS, AND 2 TABLETS BY MOUTH WITH SNACKS (Patient taking differently: Take 1,600-3,200 mg by mouth see administration instructions.) 420 Tablet 11   • promethazine (PHENERGAN) 25 MG Tab Take 1  "Tablet by mouth every 8 hours as needed for Nausea/Vomiting. 60 Tablet 11   • lisinopril (PRINIVIL) 20 MG Tab Take 20 mg by mouth at bedtime. Indications: High Blood Pressure Disorder     • lactulose 10 GM/15ML Solution Take 20 g by mouth 1 time a day as needed (Constipation). 30 mL = 20 mg.     • traZODone (DESYREL) 50 MG Tab TAKE 1 TABLET BY MOUTH EVERY NIGHT AT BEDTIME (Patient taking differently: Take 50 mg by mouth at bedtime.) 90 tablet 1   • tizanidine (ZANAFLEX) 4 MG Tab TAKE 2 TABLETS BY MOUTH AT BEDTIME AS NEEDED FOR MUSCLE SPASMS (Patient taking differently: Take 8 mg by mouth at bedtime as needed.) 180 tablet 3   • amLODIPine (NORVASC) 10 MG Tab Take 1 tablet by mouth every day. 90 tablet 3   • lacosamide (VIMPAT) 100 MG Tab tablet Take 100 mg by mouth 2 times a day. Indications: seizures         Allergies:   Allergies   Allergen Reactions   • Lorazepam [Ativan] Anxiety and Unspecified     Patient becomes severely paranoid and agitated, hallucinates   • Morphine Itching and Swelling     Tolerates Dilaudid  \"Swelling in mouth\"       Social Hx:   Social History     Socioeconomic History   • Marital status: Single     Spouse name: Not on file   • Number of children: Not on file   • Years of education: Not on file   • Highest education level: Not on file   Occupational History   • Not on file   Tobacco Use   • Smoking status: Former Smoker     Packs/day: 0.50     Years: 18.00     Pack years: 9.00     Types: Cigarettes     Quit date: 6/13/2011     Years since quitting: 10.5   • Smokeless tobacco: Never Used   • Tobacco comment: vaping now   Vaping Use   • Vaping Use: Some days   • Substances: Flavoring   • Devices: Disposable   Substance and Sexual Activity   • Alcohol use: No     Alcohol/week: 0.0 oz   • Drug use: No     Types: Marijuana   • Sexual activity: Not Currently     Partners: Male   Other Topics Concern   •  Service No   • Blood Transfusions Yes   • Caffeine Concern No   • Occupational " "Exposure No   • Hobby Hazards No   • Sleep Concern Yes   • Stress Concern Yes   • Weight Concern Yes   • Special Diet Yes   • Back Care No   • Exercise Yes   • Bike Helmet No   • Seat Belt Yes   • Self-Exams Yes   Social History Narrative   • Not on file     Social Determinants of Health     Financial Resource Strain:    • Difficulty of Paying Living Expenses: Not on file   Food Insecurity:    • Worried About Running Out of Food in the Last Year: Not on file   • Ran Out of Food in the Last Year: Not on file   Transportation Needs:    • Lack of Transportation (Medical): Not on file   • Lack of Transportation (Non-Medical): Not on file   Physical Activity:    • Days of Exercise per Week: Not on file   • Minutes of Exercise per Session: Not on file   Stress:    • Feeling of Stress : Not on file   Social Connections:    • Frequency of Communication with Friends and Family: Not on file   • Frequency of Social Gatherings with Friends and Family: Not on file   • Attends [employer] Services: Not on file   • Active Member of Clubs or Organizations: Not on file   • Attends Club or Organization Meetings: Not on file   • Marital Status: Not on file   Intimate Partner Violence:    • Fear of Current or Ex-Partner: Not on file   • Emotionally Abused: Not on file   • Physically Abused: Not on file   • Sexually Abused: Not on file   Housing Stability:    • Unable to Pay for Housing in the Last Year: Not on file   • Number of Places Lived in the Last Year: Not on file   • Unstable Housing in the Last Year: Not on file       EXAMINATION     Physical Exam:   Vitals: /90 (BP Location: Right arm, Patient Position: Sitting, BP Cuff Size: Adult long)   Pulse 95   Temp 36.6 °C (97.8 °F) (Temporal)   Ht 1.651 m (5' 5\")   Wt 87.4 kg (192 lb 10.9 oz)   SpO2 96%     Constitutional:   Body Habitus: Body mass index is 32.06 kg/m².  Cooperation: Fully cooperates with exam  Appearance: Well-groomed no disheveled, in no acute distress, " wearing a face mask   Respiratory-  breathing comfortable on room air, no wheezing.  Cardiovascular- trace edema in the lower extremities  Psychiatric- alert and oriented ×3. Normal affect.   Spine:   No skin lesions, warmth or erythema. Scarring sites anterior chest wall, PD catheter noted  Tenderness to palpation over the thoracic and lumbar paraspinals bilaterally, right greater than the left  No focal motor deficits in the lower extremities bilaterally  Gait slow, steady without loss of balance.  No use of assist device.   Ext: 2+ pitting edema in the lower extremities bilaterally    MEDICAL DECISION MAKING    DATA    Labs:     UDS:  08/20/2021: positive for oxycodone and metabolites  01/08/2021: positive for oxycodone and metabolites  03/12/2020 Positive for oxycodone and metabolites  12/19/2019 oxycodone and metabolites   08/22/2019 Oxycodone and metabolites (patient does not have a script for phenergan with codeine and this was negative) Therefore, consistent with medications  06/13/2019 Negative for oxycodone, positive for other oxycodone metabolites  04/16/2019 Positive for oxymorphone  01/17/2019 Oxycodone and metabolites as expected  10/02/2018 Oxycodone and metabolites, also fentanyl    01/09/2019: Hep B surface ag negative  01/08/2019: GFR 7; BUN 23 Cr 6.68, Plt 160    12/15/2018 CRP 3.03    GFR 12/06/2020 4  GFR 08/21/2019, 4  08/21/2019 WBC 4.6, Hb 10.1, Hct 31.6, Plt 156    BMP 12/16/2018  Na 136, Potassium 4.4, chloride 96, CO2 23 Glucose 83, BUN 55H, Cr 9.44H, Calcium 9.9, Anion gap 17.0H    CBC 3.3, Hb 9.6, Hct 31.3, Plt 115    Lab Results   Component Value Date/Time    HBA1C 4.9 06/07/2018 02:29 AM        Imaging:   I reviewed the following radiology reports reviewed  GAYATHRI 08/20/2019  Echocardiography Laboratory  CONCLUSIONS  LV EF    65%.  Structurally normal tricuspid valve.  Trace tricuspid regurgitation.  Port catheter noted in the SVC and right atrium.  Tip support appears bright, no obvious  vegetation.  The tip of the port abuts the tricuspid valve.  Recommend the port be pulled back approximately 2 cm.  No evidence of endocarditis.    Xray hip with pelvis-left 07/28/2019  Left femoral head heterogeneous density is compatible with known diagnosis of avascular necrosis. There is no joint space narrowing or spurring    Right arthroplasty without evident complication.    Xray left foot 01/21/2019  Nondisplaced transverse fracture through the base of the fifth metatarsal.    MRI lumbar spine 1/1/19  1.  Diffusely decreased signal again seen throughout the marrow space consistent with red marrow conversion, likely secondary to chronic anemia.  2.  No significant disc bulging, central canal narrowing, or foraminal narrowing.  3.  No lumbar spine fracture or subluxation.    MRI brain 12/13/2018  1.  There is no hippocampal sclerosis.  2.  There is no evidence of cortical dysplasia or neuronal migrational abnormalities.  3.  A few nonspecific T2 hyperintensities in the supratentorial white matter likely representing nonspecific foci of gliosis, chronic ischemia and demyelination. This is unchanged since the previous MRI dated 2/23/2012.       Chest xray 06/07/2018: Minimal right-sided opacities as described above, suggesting pneumonia.               Results for orders placed during the hospital encounter of 07/19/17   MR-LUMBAR SPINE-W/O    Impression 1.  Diffuse decreased bone marrow signal intensity throughout the lumbar spine and sacrum, presumably secondary to chronic anemia.    2.  Otherwise unremarkable MRI scan of the lumbar spine without contrast.                                    DIAGNOSIS   Visit Diagnoses     ICD-10-CM   1. Fibromyalgia  M79.7   2. Chronic bilateral low back pain without sciatica  M54.50    G89.29   3. Avascular necrosis of bone of hip, left ([facility])  M87.052   4. Peripheral polyneuropathy  G62.9   5. ESRD (end stage renal disease) on dialysis ([facility])  N18.6    Z99.2   6. Chronic,  continuous use of opioids  F11.90         ASSESSMENT and PLAN:     Debby Carrizales 41 y.o. Female returns for follow-up of her chronic pain related to lupus, AVN hips, low back and peripheral neuropathy    Debby was seen today for follow-up.    Diagnoses and all orders for this visit:    Fibromyalgia  -     pregabalin (LYRICA) 50 MG capsule; Take 1 Capsule by mouth 2 times a day for 30 days.    Chronic bilateral low back pain without sciatica  -     Oxycodone HCl 20 MG Tab; Take 1 Tablet by mouth 4 times a day as needed (pain) for up to 28 days.  -     Oxycodone HCl 20 MG Tab; Take 1 Tablet by mouth 4 times a day as needed (pain) for up to 30 days.  -     Consent for Opiate Prescription  -     Controlled Substance Treatment Agreement    Avascular necrosis of bone of hip, left (HCC)  -     Oxycodone HCl 20 MG Tab; Take 1 Tablet by mouth 4 times a day as needed (pain) for up to 28 days.  -     Oxycodone HCl 20 MG Tab; Take 1 Tablet by mouth 4 times a day as needed (pain) for up to 30 days.  -     pregabalin (LYRICA) 50 MG capsule; Take 1 Capsule by mouth 2 times a day for 30 days.  -     Consent for Opiate Prescription  -     Controlled Substance Treatment Agreement    Peripheral polyneuropathy  -     Oxycodone HCl 20 MG Tab; Take 1 Tablet by mouth 4 times a day as needed (pain) for up to 28 days.  -     Oxycodone HCl 20 MG Tab; Take 1 Tablet by mouth 4 times a day as needed (pain) for up to 30 days.  -     pregabalin (LYRICA) 50 MG capsule; Take 1 Capsule by mouth 2 times a day for 30 days.  -     Consent for Opiate Prescription  -     Controlled Substance Treatment Agreement    ESRD (end stage renal disease) on dialysis (HCC)    Chronic, continuous use of opioids         1. Discussed recent surgery and that she took about 2 days worth of extra medication for post-procedural pain. Hold on reduction of oxycodone for now  2. Continue lyrica 50mg po bid.  Refill given.  3. Reviewed .  Most recent UDS  "08/20/2021 as expected.  Consents obtained.  Continue oxycodone 20mg #98/month for the next two months.  4. Continue activity as tolerated.  5. Reports that Nephrology is aware of edema and they anticipate that this will stabilize with continue PD.  Follow-up with Neurology, Nephrology and her PCP.      In prescribing controlled substances to this patient, I certify that I have obtained and reviewed the medical history of Debby Carrizales. I have also made a good ly effort to obtain applicable records from other providers who have treated the patient and records demonstrating the following: report of \"drug-seeking behavior,\" although I suspect that she has organic reasons for pain and will continue to monitor as appropriate.     I have conducted a physical exam and documented it. I have reviewed Ms. Carrizales’s prescription history as maintained by the [place] Prescription Monitoring Program.     I have assessed the patient’s risk for abuse, dependency, and addiction using the validated Opioid Risk Tool available at https://www.mdcalc.com/rkefse-wdyl-iobl-ort-narcotic-abuse.     Given the above, I believe the benefits of controlled substance therapy outweigh the risks. The reasons for prescribing controlled substances include non-narcotic, oral analgesic alternatives have been inadequate for pain control and in my professional opinion, controlled substances are the only reasonable choice for this patient because she has limitations to medication choices due to being on dialysis.   Accordingly, I have discussed the risk and benefits, treatment plan, and alternative therapies with the patient.     Follow up: 2 months    Please note that this dictation was created using voice recognition software. I have made every reasonable attempt to correct obvious errors but there may be errors of grammar and content that I may have overlooked prior to finalization of this note.      Quinn Chandler MD  Interventional Spine " and Sports Physiatry  Physical Medicine and Rehabilitation  [facility]

## 2022-01-18 NOTE — CARE PLAN
Problem: Safety  Goal: Will remain free from injury  Outcome: PROGRESSING AS EXPECTED  Pt remains free from falls or injuries. Bed alarm and strip alarm set and in place. Pt calls for assistance appropriately.     Problem: Venous Thromboembolism (VTW)/Deep Vein Thrombosis (DVT) Prevention:  Goal: Patient will participate in Venous Thrombosis (VTE)/Deep Vein Thrombosis (DVT)Prevention Measures  Outcome: PROGRESSING AS EXPECTED  Pt free from s/sx of DVT. Pt receiving heparin for VTE prophylaxis.          Review of Systems      Genitourinary none   Cardiology none   Gastrointestinal frequent heartburn/acid reflux   Neurology none   Constitutional fatigue   Integumentary none   Psychiatry anxiety and depression   Musculoskeletal joint pain, muscle aches, back pain and sciatica   Pulmonary none   ENT none   Endocrine none   Hematological none

## 2022-02-04 ENCOUNTER — HOSPITAL ENCOUNTER (INPATIENT)
Facility: MEDICAL CENTER | Age: 42
LOS: 3 days | DRG: 760 | End: 2022-02-07
Attending: STUDENT IN AN ORGANIZED HEALTH CARE EDUCATION/TRAINING PROGRAM | Admitting: INTERNAL MEDICINE
Payer: MEDICARE

## 2022-02-04 ENCOUNTER — HOSPITAL ENCOUNTER (EMERGENCY)
Facility: MEDICAL CENTER | Age: 42
End: 2022-02-04
Attending: EMERGENCY MEDICINE
Payer: MEDICARE

## 2022-02-04 ENCOUNTER — APPOINTMENT (OUTPATIENT)
Dept: RADIOLOGY | Facility: MEDICAL CENTER | Age: 42
End: 2022-02-04
Attending: EMERGENCY MEDICINE
Payer: MEDICARE

## 2022-02-04 VITALS
RESPIRATION RATE: 16 BRPM | HEIGHT: 65 IN | SYSTOLIC BLOOD PRESSURE: 119 MMHG | OXYGEN SATURATION: 94 % | TEMPERATURE: 98.1 F | DIASTOLIC BLOOD PRESSURE: 74 MMHG | WEIGHT: 186.51 LBS | BODY MASS INDEX: 31.07 KG/M2 | HEART RATE: 89 BPM

## 2022-02-04 DIAGNOSIS — Z99.2 PERITONEAL DIALYSIS STATUS (HCC): ICD-10-CM

## 2022-02-04 DIAGNOSIS — N93.8 DUB (DYSFUNCTIONAL UTERINE BLEEDING): ICD-10-CM

## 2022-02-04 DIAGNOSIS — E87.5 HYPERKALEMIA: ICD-10-CM

## 2022-02-04 DIAGNOSIS — Z99.2 ESRD (END STAGE RENAL DISEASE) ON DIALYSIS (HCC): ICD-10-CM

## 2022-02-04 DIAGNOSIS — N18.6 ESRD (END STAGE RENAL DISEASE) ON DIALYSIS (HCC): ICD-10-CM

## 2022-02-04 DIAGNOSIS — D50.0 IRON DEFICIENCY ANEMIA DUE TO CHRONIC BLOOD LOSS: ICD-10-CM

## 2022-02-04 PROBLEM — D61.818 PANCYTOPENIA (HCC): Status: ACTIVE | Noted: 2017-07-19

## 2022-02-04 LAB
ABO GROUP BLD: NORMAL
ALBUMIN SERPL BCP-MCNC: 3 G/DL (ref 3.2–4.9)
ALBUMIN/GLOB SERPL: 1.3 G/DL
ALP SERPL-CCNC: 105 U/L (ref 30–99)
ALT SERPL-CCNC: 6 U/L (ref 2–50)
ANION GAP SERPL CALC-SCNC: 20 MMOL/L (ref 7–16)
APTT PPP: 63.1 SEC (ref 24.7–36)
AST SERPL-CCNC: 10 U/L (ref 12–45)
BARCODED ABORH UBTYP: 6200
BARCODED PRD CODE UBPRD: NORMAL
BARCODED UNIT NUM UBUNT: NORMAL
BASO STIPL BLD QL SMEAR: NORMAL
BASOPHILS # BLD AUTO: 1 % (ref 0–1.8)
BASOPHILS # BLD: 0.03 K/UL (ref 0–0.12)
BILIRUB SERPL-MCNC: 0.2 MG/DL (ref 0.1–1.5)
BLD GP AB SCN SERPL QL: NORMAL
BUN SERPL-MCNC: 95 MG/DL (ref 8–22)
CALCIUM SERPL-MCNC: 7 MG/DL (ref 8.4–10.2)
CHLORIDE SERPL-SCNC: 98 MMOL/L (ref 96–112)
CO2 SERPL-SCNC: 22 MMOL/L (ref 20–33)
COMMENT 1642: NORMAL
COMPONENT R 8504R: NORMAL
CREAT SERPL-MCNC: 16.73 MG/DL (ref 0.5–1.4)
EKG IMPRESSION: NORMAL
EKG IMPRESSION: NORMAL
EOSINOPHIL # BLD AUTO: 0.15 K/UL (ref 0–0.51)
EOSINOPHIL NFR BLD: 5.2 % (ref 0–6.9)
ERYTHROCYTE [DISTWIDTH] IN BLOOD BY AUTOMATED COUNT: 51.6 FL (ref 35.9–50)
FLUAV RNA SPEC QL NAA+PROBE: NEGATIVE
FLUBV RNA SPEC QL NAA+PROBE: NEGATIVE
GLOBULIN SER CALC-MCNC: 2.4 G/DL (ref 1.9–3.5)
GLUCOSE BLD-MCNC: 106 MG/DL (ref 65–99)
GLUCOSE BLD-MCNC: 93 MG/DL (ref 65–99)
GLUCOSE SERPL-MCNC: 98 MG/DL (ref 65–99)
HCG SERPL QL: NEGATIVE
HCT VFR BLD AUTO: 13.1 % (ref 37–47)
HGB BLD-MCNC: 3.9 G/DL (ref 12–16)
IMM GRANULOCYTES # BLD AUTO: 0.03 K/UL (ref 0–0.11)
IMM GRANULOCYTES NFR BLD AUTO: 1 % (ref 0–0.9)
INR PPP: 1.13 (ref 0.87–1.13)
LYMPHOCYTES # BLD AUTO: 0.87 K/UL (ref 1–4.8)
LYMPHOCYTES NFR BLD: 30.1 % (ref 22–41)
MCH RBC QN AUTO: 30.5 PG (ref 27–33)
MCHC RBC AUTO-ENTMCNC: 29.8 G/DL (ref 33.6–35)
MCV RBC AUTO: 102.3 FL (ref 81.4–97.8)
MONOCYTES # BLD AUTO: 0.34 K/UL (ref 0–0.85)
MONOCYTES NFR BLD AUTO: 11.8 % (ref 0–13.4)
NEUTROPHILS # BLD AUTO: 1.47 K/UL (ref 2–7.15)
NEUTROPHILS NFR BLD: 50.9 % (ref 44–72)
NRBC # BLD AUTO: 0 K/UL
NRBC BLD-RTO: 0 /100 WBC
PLATELET # BLD AUTO: 129 K/UL (ref 164–446)
PLATELET # BLD AUTO: 136 K/UL (ref 164–446)
PLATELET BLD QL SMEAR: NORMAL
PMV BLD AUTO: 9.9 FL (ref 9–12.9)
POLYCHROMASIA BLD QL SMEAR: NORMAL
POTASSIUM SERPL-SCNC: 6.2 MMOL/L (ref 3.6–5.5)
PRODUCT TYPE UPROD: NORMAL
PROT SERPL-MCNC: 5.4 G/DL (ref 6–8.2)
PROTHROMBIN TIME: 13.6 SEC (ref 12–14.6)
RBC # BLD AUTO: 1.28 M/UL (ref 4.2–5.4)
RBC BLD AUTO: PRESENT
RH BLD: NORMAL
RSV RNA SPEC QL NAA+PROBE: NEGATIVE
SARS-COV-2 RNA RESP QL NAA+PROBE: NOTDETECTED
SODIUM SERPL-SCNC: 140 MMOL/L (ref 135–145)
SPECIMEN SOURCE: NORMAL
TROPONIN T SERPL-MCNC: 178 NG/L (ref 6–19)
UNIT STATUS USTAT: NORMAL
WBC # BLD AUTO: 2.9 K/UL (ref 4.8–10.8)

## 2022-02-04 PROCEDURE — 700105 HCHG RX REV CODE 258: Performed by: STUDENT IN AN ORGANIZED HEALTH CARE EDUCATION/TRAINING PROGRAM

## 2022-02-04 PROCEDURE — 770000 HCHG ROOM/CARE - INTERMEDIATE ICU *

## 2022-02-04 PROCEDURE — 700111 HCHG RX REV CODE 636 W/ 250 OVERRIDE (IP): Performed by: EMERGENCY MEDICINE

## 2022-02-04 PROCEDURE — 82962 GLUCOSE BLOOD TEST: CPT | Mod: 91

## 2022-02-04 PROCEDURE — 0241U HCHG SARS-COV-2 COVID-19 NFCT DS RESP RNA 4 TRGT MIC: CPT

## 2022-02-04 PROCEDURE — 85384 FIBRINOGEN ACTIVITY: CPT

## 2022-02-04 PROCEDURE — 86922 COMPATIBILITY TEST ANTIGLOB: CPT

## 2022-02-04 PROCEDURE — 700102 HCHG RX REV CODE 250 W/ 637 OVERRIDE(OP): Performed by: EMERGENCY MEDICINE

## 2022-02-04 PROCEDURE — 86850 RBC ANTIBODY SCREEN: CPT | Mod: 91

## 2022-02-04 PROCEDURE — 86850 RBC ANTIBODY SCREEN: CPT

## 2022-02-04 PROCEDURE — 700111 HCHG RX REV CODE 636 W/ 250 OVERRIDE (IP): Performed by: STUDENT IN AN ORGANIZED HEALTH CARE EDUCATION/TRAINING PROGRAM

## 2022-02-04 PROCEDURE — 99285 EMERGENCY DEPT VISIT HI MDM: CPT

## 2022-02-04 PROCEDURE — 76856 US EXAM PELVIC COMPLETE: CPT

## 2022-02-04 PROCEDURE — 85610 PROTHROMBIN TIME: CPT

## 2022-02-04 PROCEDURE — 85730 THROMBOPLASTIN TIME PARTIAL: CPT

## 2022-02-04 PROCEDURE — 86900 BLOOD TYPING SEROLOGIC ABO: CPT

## 2022-02-04 PROCEDURE — C9803 HOPD COVID-19 SPEC COLLECT: HCPCS | Performed by: EMERGENCY MEDICINE

## 2022-02-04 PROCEDURE — 85347 COAGULATION TIME ACTIVATED: CPT

## 2022-02-04 PROCEDURE — 99223 1ST HOSP IP/OBS HIGH 75: CPT | Performed by: STUDENT IN AN ORGANIZED HEALTH CARE EDUCATION/TRAINING PROGRAM

## 2022-02-04 PROCEDURE — 36415 COLL VENOUS BLD VENIPUNCTURE: CPT

## 2022-02-04 PROCEDURE — 85049 AUTOMATED PLATELET COUNT: CPT

## 2022-02-04 PROCEDURE — 85025 COMPLETE CBC W/AUTO DIFF WBC: CPT

## 2022-02-04 PROCEDURE — 86901 BLOOD TYPING SEROLOGIC RH(D): CPT | Mod: 91

## 2022-02-04 PROCEDURE — 93005 ELECTROCARDIOGRAM TRACING: CPT | Performed by: STUDENT IN AN ORGANIZED HEALTH CARE EDUCATION/TRAINING PROGRAM

## 2022-02-04 PROCEDURE — 86902 BLOOD TYPE ANTIGEN DONOR EA: CPT

## 2022-02-04 PROCEDURE — 96375 TX/PRO/DX INJ NEW DRUG ADDON: CPT

## 2022-02-04 PROCEDURE — 85576 BLOOD PLATELET AGGREGATION: CPT

## 2022-02-04 PROCEDURE — 93005 ELECTROCARDIOGRAM TRACING: CPT | Mod: XE,76 | Performed by: EMERGENCY MEDICINE

## 2022-02-04 PROCEDURE — 86901 BLOOD TYPING SEROLOGIC RH(D): CPT

## 2022-02-04 PROCEDURE — 84703 CHORIONIC GONADOTROPIN ASSAY: CPT

## 2022-02-04 PROCEDURE — 93005 ELECTROCARDIOGRAM TRACING: CPT

## 2022-02-04 PROCEDURE — 80053 COMPREHEN METABOLIC PANEL: CPT

## 2022-02-04 PROCEDURE — 700101 HCHG RX REV CODE 250: Performed by: EMERGENCY MEDICINE

## 2022-02-04 PROCEDURE — 84484 ASSAY OF TROPONIN QUANT: CPT

## 2022-02-04 PROCEDURE — 96365 THER/PROPH/DIAG IV INF INIT: CPT

## 2022-02-04 PROCEDURE — 86922 COMPATIBILITY TEST ANTIGLOB: CPT | Mod: 91

## 2022-02-04 RX ORDER — HEPARIN SODIUM (PORCINE) LOCK FLUSH IV SOLN 100 UNIT/ML 100 UNIT/ML
500 SOLUTION INTRAVENOUS PRN
Status: DISCONTINUED | OUTPATIENT
Start: 2022-02-04 | End: 2022-02-04 | Stop reason: HOSPADM

## 2022-02-04 RX ORDER — ONDANSETRON 2 MG/ML
4 INJECTION INTRAMUSCULAR; INTRAVENOUS ONCE
Status: COMPLETED | OUTPATIENT
Start: 2022-02-04 | End: 2022-02-04

## 2022-02-04 RX ORDER — CALCIUM GLUCONATE 20 MG/ML
2 INJECTION, SOLUTION INTRAVENOUS ONCE
Status: COMPLETED | OUTPATIENT
Start: 2022-02-04 | End: 2022-02-04

## 2022-02-04 RX ORDER — DEXTROSE MONOHYDRATE 25 G/50ML
50 INJECTION, SOLUTION INTRAVENOUS ONCE
Status: COMPLETED | OUTPATIENT
Start: 2022-02-04 | End: 2022-02-04

## 2022-02-04 RX ORDER — SODIUM CHLORIDE 9 MG/ML
INJECTION, SOLUTION INTRAVENOUS CONTINUOUS
Status: DISCONTINUED | OUTPATIENT
Start: 2022-02-04 | End: 2022-02-05

## 2022-02-04 RX ORDER — ONDANSETRON 2 MG/ML
4 INJECTION INTRAMUSCULAR; INTRAVENOUS EVERY 4 HOURS PRN
Status: ACTIVE | OUTPATIENT
Start: 2022-02-04 | End: 2022-02-05

## 2022-02-04 RX ORDER — ACETAMINOPHEN 325 MG/1
650 TABLET ORAL EVERY 6 HOURS PRN
Status: ACTIVE | OUTPATIENT
Start: 2022-02-04 | End: 2022-02-05

## 2022-02-04 RX ADMIN — DEXTROSE MONOHYDRATE 50 ML: 25 INJECTION, SOLUTION INTRAVENOUS at 20:05

## 2022-02-04 RX ADMIN — SODIUM BICARBONATE 50 MEQ: 84 INJECTION INTRAVENOUS at 20:01

## 2022-02-04 RX ADMIN — CALCIUM GLUCONATE 2 G: 20 INJECTION, SOLUTION INTRAVENOUS at 20:05

## 2022-02-04 RX ADMIN — ONDANSETRON 4 MG: 2 INJECTION INTRAMUSCULAR; INTRAVENOUS at 20:11

## 2022-02-04 RX ADMIN — SODIUM CHLORIDE: 9 INJECTION, SOLUTION INTRAVENOUS at 23:30

## 2022-02-04 RX ADMIN — FENTANYL CITRATE 50 MCG: 50 INJECTION, SOLUTION INTRAMUSCULAR; INTRAVENOUS at 19:09

## 2022-02-04 RX ADMIN — INSULIN HUMAN 4 UNITS: 100 INJECTION, SOLUTION PARENTERAL at 20:05

## 2022-02-04 RX ADMIN — FENTANYL CITRATE 50 MCG: 50 INJECTION, SOLUTION INTRAMUSCULAR; INTRAVENOUS at 23:56

## 2022-02-04 ASSESSMENT — PATIENT HEALTH QUESTIONNAIRE - PHQ9
SUM OF ALL RESPONSES TO PHQ9 QUESTIONS 1 AND 2: 0
1. LITTLE INTEREST OR PLEASURE IN DOING THINGS: NOT AT ALL
2. FEELING DOWN, DEPRESSED, IRRITABLE, OR HOPELESS: NOT AT ALL

## 2022-02-04 ASSESSMENT — PAIN DESCRIPTION - PAIN TYPE
TYPE: ACUTE PAIN
TYPE: ACUTE PAIN

## 2022-02-04 ASSESSMENT — FIBROSIS 4 INDEX
FIB4 SCORE: 1.17
FIB4 SCORE: 1.48

## 2022-02-05 PROBLEM — N93.8 DYSFUNCTIONAL UTERINE BLEEDING: Status: ACTIVE | Noted: 2022-02-05

## 2022-02-05 LAB
ABO GROUP BLD: NORMAL
BARCODED ABORH UBTYP: 6200
BARCODED PRD CODE UBPRD: NORMAL
BARCODED UNIT NUM UBUNT: NORMAL
BASOPHILS # BLD AUTO: 1.6 % (ref 0–1.8)
BASOPHILS # BLD: 0.04 K/UL (ref 0–0.12)
BLD GP AB SCN SERPL QL: NORMAL
CA-I SERPL-SCNC: 0.9 MMOL/L (ref 1.1–1.3)
CFT BLD TEG: 3.6 MIN (ref 4.6–9.1)
CFT P HPASE BLD TEG: 3.7 MIN (ref 4.3–8.3)
CLOT ANGLE BLD TEG: 77.3 DEGREES (ref 63–78)
CLOT LYSIS 30M P MA LENFR BLD TEG: 0 % (ref 0–2.6)
COMPONENT R 8504R: NORMAL
CRP SERPL HS-MCNC: <0.3 MG/DL (ref 0–0.75)
CT.EXTRINSIC BLD ROTEM: 0.8 MIN (ref 0.8–2.1)
EKG IMPRESSION: NORMAL
EOSINOPHIL # BLD AUTO: 0.12 K/UL (ref 0–0.51)
EOSINOPHIL NFR BLD: 4.8 % (ref 0–6.9)
ERYTHROCYTE [DISTWIDTH] IN BLOOD BY AUTOMATED COUNT: 50.4 FL (ref 35.9–50)
ERYTHROCYTE [SEDIMENTATION RATE] IN BLOOD BY WESTERGREN METHOD: >140 MM/HOUR (ref 0–25)
HAV IGM SERPL QL IA: NORMAL
HBV CORE IGM SER QL: NORMAL
HBV SURFACE AB SERPL IA-ACNC: 21.1 MIU/ML (ref 0–10)
HBV SURFACE AG SER QL: NORMAL
HCT VFR BLD AUTO: 17.7 % (ref 37–47)
HCT VFR BLD AUTO: 26.4 % (ref 37–47)
HCV AB SER QL: NORMAL
HGB BLD-MCNC: 5.7 G/DL (ref 12–16)
HGB BLD-MCNC: 9 G/DL (ref 12–16)
IMM GRANULOCYTES # BLD AUTO: 0.03 K/UL (ref 0–0.11)
IMM GRANULOCYTES NFR BLD AUTO: 1.2 % (ref 0–0.9)
LYMPHOCYTES # BLD AUTO: 0.79 K/UL (ref 1–4.8)
LYMPHOCYTES NFR BLD: 31.6 % (ref 22–41)
MCF BLD TEG: 68 MM (ref 52–69)
MCF.PLATELET INHIB BLD ROTEM: 29.9 MM (ref 15–32)
MCH RBC QN AUTO: 31.5 PG (ref 27–33)
MCHC RBC AUTO-ENTMCNC: 32.2 G/DL (ref 33.6–35)
MCV RBC AUTO: 97.8 FL (ref 81.4–97.8)
MONOCYTES # BLD AUTO: 0.37 K/UL (ref 0–0.85)
MONOCYTES NFR BLD AUTO: 14.8 % (ref 0–13.4)
NEUTROPHILS # BLD AUTO: 1.15 K/UL (ref 2–7.15)
NEUTROPHILS NFR BLD: 46 % (ref 44–72)
NRBC # BLD AUTO: 0 K/UL
NRBC BLD-RTO: 0 /100 WBC
PHOSPHATE SERPL-MCNC: 12 MG/DL (ref 2.5–4.5)
PLATELET # BLD AUTO: 111 K/UL (ref 164–446)
PMV BLD AUTO: 10.5 FL (ref 9–12.9)
POTASSIUM SERPL-SCNC: 5.9 MMOL/L (ref 3.6–5.5)
PRODUCT TYPE UPROD: NORMAL
RBC # BLD AUTO: 1.81 M/UL (ref 4.2–5.4)
RH BLD: NORMAL
TEG ALGORITHM TGALG: ABNORMAL
TROPONIN T SERPL-MCNC: 175 NG/L (ref 6–19)
UNIT STATUS USTAT: NORMAL
URATE SERPL-MCNC: 7.6 MG/DL (ref 1.9–8.2)
WBC # BLD AUTO: 2.5 K/UL (ref 4.8–10.8)

## 2022-02-05 PROCEDURE — 700111 HCHG RX REV CODE 636 W/ 250 OVERRIDE (IP): Mod: JG | Performed by: INTERNAL MEDICINE

## 2022-02-05 PROCEDURE — 700102 HCHG RX REV CODE 250 W/ 637 OVERRIDE(OP): Performed by: STUDENT IN AN ORGANIZED HEALTH CARE EDUCATION/TRAINING PROGRAM

## 2022-02-05 PROCEDURE — 700102 HCHG RX REV CODE 250 W/ 637 OVERRIDE(OP): Performed by: HOSPITALIST

## 2022-02-05 PROCEDURE — 700111 HCHG RX REV CODE 636 W/ 250 OVERRIDE (IP): Mod: JG | Performed by: OBSTETRICS & GYNECOLOGY

## 2022-02-05 PROCEDURE — 86706 HEP B SURFACE ANTIBODY: CPT

## 2022-02-05 PROCEDURE — 82330 ASSAY OF CALCIUM: CPT

## 2022-02-05 PROCEDURE — 85014 HEMATOCRIT: CPT

## 2022-02-05 PROCEDURE — 36430 TRANSFUSION BLD/BLD COMPNT: CPT

## 2022-02-05 PROCEDURE — 84550 ASSAY OF BLOOD/URIC ACID: CPT

## 2022-02-05 PROCEDURE — 85652 RBC SED RATE AUTOMATED: CPT

## 2022-02-05 PROCEDURE — 84132 ASSAY OF SERUM POTASSIUM: CPT

## 2022-02-05 PROCEDURE — A9270 NON-COVERED ITEM OR SERVICE: HCPCS | Performed by: HOSPITALIST

## 2022-02-05 PROCEDURE — 90945 DIALYSIS ONE EVALUATION: CPT

## 2022-02-05 PROCEDURE — P9016 RBC LEUKOCYTES REDUCED: HCPCS | Mod: 91

## 2022-02-05 PROCEDURE — 85018 HEMOGLOBIN: CPT

## 2022-02-05 PROCEDURE — 86902 BLOOD TYPE ANTIGEN DONOR EA: CPT

## 2022-02-05 PROCEDURE — 99233 SBSQ HOSP IP/OBS HIGH 50: CPT | Performed by: HOSPITALIST

## 2022-02-05 PROCEDURE — 85025 COMPLETE CBC W/AUTO DIFF WBC: CPT

## 2022-02-05 PROCEDURE — 84100 ASSAY OF PHOSPHORUS: CPT

## 2022-02-05 PROCEDURE — 80074 ACUTE HEPATITIS PANEL: CPT

## 2022-02-05 PROCEDURE — 93010 ELECTROCARDIOGRAM REPORT: CPT | Performed by: INTERNAL MEDICINE

## 2022-02-05 PROCEDURE — 700105 HCHG RX REV CODE 258: Performed by: HOSPITALIST

## 2022-02-05 PROCEDURE — 86140 C-REACTIVE PROTEIN: CPT

## 2022-02-05 PROCEDURE — 770000 HCHG ROOM/CARE - INTERMEDIATE ICU *

## 2022-02-05 PROCEDURE — 700101 HCHG RX REV CODE 250: Performed by: STUDENT IN AN ORGANIZED HEALTH CARE EDUCATION/TRAINING PROGRAM

## 2022-02-05 PROCEDURE — 700105 HCHG RX REV CODE 258: Performed by: STUDENT IN AN ORGANIZED HEALTH CARE EDUCATION/TRAINING PROGRAM

## 2022-02-05 PROCEDURE — 30243N1 TRANSFUSION OF NONAUTOLOGOUS RED BLOOD CELLS INTO CENTRAL VEIN, PERCUTANEOUS APPROACH: ICD-10-PCS | Performed by: HOSPITALIST

## 2022-02-05 PROCEDURE — 99223 1ST HOSP IP/OBS HIGH 75: CPT | Performed by: INTERNAL MEDICINE

## 2022-02-05 PROCEDURE — 84484 ASSAY OF TROPONIN QUANT: CPT

## 2022-02-05 PROCEDURE — 3E1M39Z IRRIGATION OF PERITONEAL CAVITY USING DIALYSATE, PERCUTANEOUS APPROACH: ICD-10-PCS | Performed by: INTERNAL MEDICINE

## 2022-02-05 PROCEDURE — 700111 HCHG RX REV CODE 636 W/ 250 OVERRIDE (IP): Performed by: HOSPITALIST

## 2022-02-05 PROCEDURE — A9270 NON-COVERED ITEM OR SERVICE: HCPCS | Performed by: STUDENT IN AN ORGANIZED HEALTH CARE EDUCATION/TRAINING PROGRAM

## 2022-02-05 RX ORDER — TIZANIDINE 4 MG/1
8 TABLET ORAL NIGHTLY PRN
Status: DISCONTINUED | OUTPATIENT
Start: 2022-02-05 | End: 2022-02-07 | Stop reason: HOSPADM

## 2022-02-05 RX ORDER — OXYCODONE HYDROCHLORIDE 5 MG/1
2.5 TABLET ORAL
Status: DISCONTINUED | OUTPATIENT
Start: 2022-02-05 | End: 2022-02-05

## 2022-02-05 RX ORDER — LABETALOL HYDROCHLORIDE 5 MG/ML
10 INJECTION, SOLUTION INTRAVENOUS EVERY 4 HOURS PRN
Status: DISCONTINUED | OUTPATIENT
Start: 2022-02-05 | End: 2022-02-07 | Stop reason: HOSPADM

## 2022-02-05 RX ORDER — PROCHLORPERAZINE EDISYLATE 5 MG/ML
5-10 INJECTION INTRAMUSCULAR; INTRAVENOUS EVERY 4 HOURS PRN
Status: DISCONTINUED | OUTPATIENT
Start: 2022-02-05 | End: 2022-02-07 | Stop reason: HOSPADM

## 2022-02-05 RX ORDER — MEGESTROL ACETATE 40 MG/ML
800 SUSPENSION ORAL DAILY
Status: DISCONTINUED | OUTPATIENT
Start: 2022-02-06 | End: 2022-02-07 | Stop reason: HOSPADM

## 2022-02-05 RX ORDER — ACETAMINOPHEN 325 MG/1
650 TABLET ORAL EVERY 6 HOURS PRN
Status: DISCONTINUED | OUTPATIENT
Start: 2022-02-05 | End: 2022-02-07 | Stop reason: HOSPADM

## 2022-02-05 RX ORDER — PROMETHAZINE HYDROCHLORIDE 25 MG/1
12.5-25 TABLET ORAL EVERY 4 HOURS PRN
Status: DISCONTINUED | OUTPATIENT
Start: 2022-02-05 | End: 2022-02-07 | Stop reason: HOSPADM

## 2022-02-05 RX ORDER — SEVELAMER CARBONATE 800 MG/1
TABLET, FILM COATED ORAL SEE ADMIN INSTRUCTIONS
Status: DISCONTINUED | OUTPATIENT
Start: 2022-02-05 | End: 2022-02-07 | Stop reason: HOSPADM

## 2022-02-05 RX ORDER — PREGABALIN 25 MG/1
50 CAPSULE ORAL 2 TIMES DAILY
Status: DISCONTINUED | OUTPATIENT
Start: 2022-02-05 | End: 2022-02-07 | Stop reason: HOSPADM

## 2022-02-05 RX ORDER — PROMETHAZINE HYDROCHLORIDE 25 MG/1
25 TABLET ORAL EVERY 8 HOURS PRN
Status: DISCONTINUED | OUTPATIENT
Start: 2022-02-05 | End: 2022-02-05

## 2022-02-05 RX ORDER — ONDANSETRON 2 MG/ML
4 INJECTION INTRAMUSCULAR; INTRAVENOUS EVERY 4 HOURS PRN
Status: DISCONTINUED | OUTPATIENT
Start: 2022-02-05 | End: 2022-02-07 | Stop reason: HOSPADM

## 2022-02-05 RX ORDER — SODIUM CHLORIDE 9 MG/ML
INJECTION, SOLUTION INTRAVENOUS CONTINUOUS
Status: DISCONTINUED | OUTPATIENT
Start: 2022-02-05 | End: 2022-02-05

## 2022-02-05 RX ORDER — ONDANSETRON 4 MG/1
4 TABLET, ORALLY DISINTEGRATING ORAL EVERY 4 HOURS PRN
Status: DISCONTINUED | OUTPATIENT
Start: 2022-02-05 | End: 2022-02-07 | Stop reason: HOSPADM

## 2022-02-05 RX ORDER — MEDROXYPROGESTERONE ACETATE 150 MG/ML
150 INJECTION, SUSPENSION INTRAMUSCULAR ONCE
Status: DISCONTINUED | OUTPATIENT
Start: 2022-02-05 | End: 2022-02-05

## 2022-02-05 RX ORDER — OXYCODONE HYDROCHLORIDE 5 MG/1
20 TABLET ORAL 4 TIMES DAILY PRN
Status: DISCONTINUED | OUTPATIENT
Start: 2022-02-05 | End: 2022-02-07 | Stop reason: HOSPADM

## 2022-02-05 RX ORDER — POLYETHYLENE GLYCOL 3350 17 G/17G
1 POWDER, FOR SOLUTION ORAL
Status: DISCONTINUED | OUTPATIENT
Start: 2022-02-05 | End: 2022-02-07 | Stop reason: HOSPADM

## 2022-02-05 RX ORDER — AMLODIPINE BESYLATE 10 MG/1
10 TABLET ORAL DAILY
Status: DISCONTINUED | OUTPATIENT
Start: 2022-02-05 | End: 2022-02-07 | Stop reason: HOSPADM

## 2022-02-05 RX ORDER — BISACODYL 10 MG
10 SUPPOSITORY, RECTAL RECTAL
Status: DISCONTINUED | OUTPATIENT
Start: 2022-02-05 | End: 2022-02-07 | Stop reason: HOSPADM

## 2022-02-05 RX ORDER — TRAZODONE HYDROCHLORIDE 50 MG/1
50 TABLET ORAL
Status: DISCONTINUED | OUTPATIENT
Start: 2022-02-05 | End: 2022-02-07 | Stop reason: HOSPADM

## 2022-02-05 RX ORDER — AMOXICILLIN 250 MG
2 CAPSULE ORAL 2 TIMES DAILY
Status: DISCONTINUED | OUTPATIENT
Start: 2022-02-05 | End: 2022-02-07 | Stop reason: HOSPADM

## 2022-02-05 RX ORDER — PROMETHAZINE HYDROCHLORIDE 25 MG/1
12.5-25 SUPPOSITORY RECTAL EVERY 4 HOURS PRN
Status: DISCONTINUED | OUTPATIENT
Start: 2022-02-05 | End: 2022-02-07 | Stop reason: HOSPADM

## 2022-02-05 RX ORDER — LACOSAMIDE 100 MG/1
100 TABLET ORAL 2 TIMES DAILY
Status: DISCONTINUED | OUTPATIENT
Start: 2022-02-05 | End: 2022-02-07 | Stop reason: HOSPADM

## 2022-02-05 RX ORDER — MEGESTROL ACETATE 40 MG/ML
800 SUSPENSION ORAL DAILY
Status: DISCONTINUED | OUTPATIENT
Start: 2022-02-05 | End: 2022-02-05

## 2022-02-05 RX ORDER — OXYCODONE HYDROCHLORIDE 5 MG/1
5 TABLET ORAL
Status: DISCONTINUED | OUTPATIENT
Start: 2022-02-05 | End: 2022-02-05

## 2022-02-05 RX ORDER — HYDROMORPHONE HYDROCHLORIDE 1 MG/ML
0.25 INJECTION, SOLUTION INTRAMUSCULAR; INTRAVENOUS; SUBCUTANEOUS
Status: DISCONTINUED | OUTPATIENT
Start: 2022-02-05 | End: 2022-02-05

## 2022-02-05 RX ADMIN — CALCIUM CHLORIDE 1000 MG: 100 INJECTION, SOLUTION INTRAVENOUS at 11:45

## 2022-02-05 RX ADMIN — OXYCODONE 20 MG: 5 TABLET ORAL at 05:24

## 2022-02-05 RX ADMIN — CONJUGATED ESTROGENS 25 MG: 25 INJECTION, POWDER, LYOPHILIZED, FOR SOLUTION INTRAMUSCULAR; INTRAVENOUS at 23:44

## 2022-02-05 RX ADMIN — LACOSAMIDE 100 MG: 100 TABLET, FILM COATED ORAL at 01:00

## 2022-02-05 RX ADMIN — PREGABALIN 50 MG: 25 CAPSULE ORAL at 17:07

## 2022-02-05 RX ADMIN — CONJUGATED ESTROGENS 25 MG: 25 INJECTION, POWDER, LYOPHILIZED, FOR SOLUTION INTRAMUSCULAR; INTRAVENOUS at 17:07

## 2022-02-05 RX ADMIN — CONJUGATED ESTROGENS 25 MG: 25 INJECTION, POWDER, LYOPHILIZED, FOR SOLUTION INTRAMUSCULAR; INTRAVENOUS at 12:02

## 2022-02-05 RX ADMIN — PREGABALIN 50 MG: 25 CAPSULE ORAL at 10:16

## 2022-02-05 RX ADMIN — TRAZODONE HYDROCHLORIDE 50 MG: 50 TABLET ORAL at 23:44

## 2022-02-05 RX ADMIN — EPOETIN ALFA-EPBX 15000 UNITS: 10000 INJECTION, SOLUTION INTRAVENOUS; SUBCUTANEOUS at 14:55

## 2022-02-05 RX ADMIN — LACOSAMIDE 100 MG: 100 TABLET, FILM COATED ORAL at 23:44

## 2022-02-05 RX ADMIN — SODIUM CHLORIDE: 9 INJECTION, SOLUTION INTRAVENOUS at 06:49

## 2022-02-05 RX ADMIN — LACOSAMIDE 100 MG: 100 TABLET, FILM COATED ORAL at 13:02

## 2022-02-05 RX ADMIN — OXYCODONE 20 MG: 5 TABLET ORAL at 19:15

## 2022-02-05 RX ADMIN — ACETAMINOPHEN 650 MG: 325 TABLET, FILM COATED ORAL at 09:42

## 2022-02-05 RX ADMIN — MEGESTROL ACETATE 800 MG: 40 SUSPENSION ORAL at 02:39

## 2022-02-05 RX ADMIN — OXYCODONE 20 MG: 5 TABLET ORAL at 13:02

## 2022-02-05 RX ADMIN — AMLODIPINE BESYLATE 10 MG: 10 TABLET ORAL at 05:24

## 2022-02-05 ASSESSMENT — ENCOUNTER SYMPTOMS
NEUROLOGICAL NEGATIVE: 1
DEPRESSION: 0
ABDOMINAL PAIN: 1
FOCAL WEAKNESS: 0
CARDIOVASCULAR NEGATIVE: 1
NERVOUS/ANXIOUS: 1
POLYDIPSIA: 0
RESPIRATORY NEGATIVE: 1
FEVER: 0
NECK PAIN: 0
MYALGIAS: 1
HEARTBURN: 0
DIZZINESS: 0
COUGH: 0
ABDOMINAL PAIN: 0
SHORTNESS OF BREATH: 0
VOMITING: 0
WEAKNESS: 0
NAUSEA: 0
EYES NEGATIVE: 1
CHILLS: 0
BRUISES/BLEEDS EASILY: 0
BACK PAIN: 1
HEADACHES: 0
PALPITATIONS: 0

## 2022-02-05 ASSESSMENT — PAIN DESCRIPTION - PAIN TYPE
TYPE: ACUTE PAIN
TYPE: ACUTE PAIN;CHRONIC PAIN
TYPE: ACUTE PAIN;CHRONIC PAIN
TYPE: ACUTE PAIN

## 2022-02-05 ASSESSMENT — FIBROSIS 4 INDEX: FIB4 SCORE: 1.55

## 2022-02-05 NOTE — ED NOTES
Med Rec completed per patient and home pharmacy ([facility])  Allergies reviewed  No ORAL antibiotics in last 30 days

## 2022-02-05 NOTE — PROGRESS NOTES
Critical H+H 5.7 / 17.7 reported from lab, MD aware, order for 2 U RBC placed.   Pt stable, VSS, will continue to monitor.

## 2022-02-05 NOTE — ED NOTES
US bedside      1827 US complete. Pt hooked up to monitor. Pt declines any needs at this time.     1900 Pts port accessed per verbal order from ERP. Labs sent and pt medicated per MAR.     1922 Pt provided with extra blankets. Pt swabbed for COVID.     1945 ERP updated on pts labs. Pt to be transferred to [facility] once pt has accepting MD. Pt updated     2015 Pt medicated per MAR. Pt states that her SOB is worsening and her CP has increased. Repeat EKG to be completed.      2030 Repeat EKG no change per ERP.     2045 Pts repeat blood sugar 106, Calcium still running. Pt declines any other needs at this time.     2112 Pt resting comfortably at this time. Calcium completed. Pts belongings packed. Pt updated on plan of care, pt declines questions or needs.

## 2022-02-05 NOTE — ED TRIAGE NOTES
Problem: Falls - Risk of:  Goal: Will remain free from falls  Will remain free from falls   Outcome: Ongoing    Goal: Absence of physical injury  Absence of physical injury   Outcome: Ongoing Pt AAO Skin warm and dry Conjunctiva pale

## 2022-02-05 NOTE — PROGRESS NOTES
4 Eyes Skin Assessment Completed by Layne Mai, RN and Juvenal Cuevas, JOSETTE.    Head wdl  Ears wdl  Nose wdl  Mouth wdl  Neck wdl  Breast/Chest left subclavian port  Shoulder Blades wdl  Spine wdl  (R) Arm/Elbow/Hand wdl  (L) Arm/Elbow/Hand wdl  Abdomen tenckhoff drain  Groin wdl  Scrotum/Coccyx/Buttocks wdl  (R) Leg edema, small circular scars  (L) Leg edema, small circular scars  (R) Heel/Foot/Toe wdl  (L) Heel/Foot/Toe wdl        Devices In Places left subclavian port; tenckhoff drain    Interventions In Place; none    Possible Skin Injury; none    Pictures Uploaded Into Epic n/a    Wound Consult Placed no    RN Wound Prevention Protocol Ordered no

## 2022-02-05 NOTE — ASSESSMENT & PLAN NOTE
Troponin 178 in [facility] ED  Repeat troponin ordered  Denies chest pain  Likely 2/2 demand ischemia  EKG showing low voltage NSR  Admitted with telemetry

## 2022-02-05 NOTE — ED NOTES
Adelina from Lab called with critical result of HGB 39., HCT 13.1 Trop 178, BUN 95, CRT 16.73, and potassium 6.2 at 1935. Critical lab result read back to 1935.   Dr. Cevallos notified of critical lab result at 1936.  Critical lab result read back by Dr. Cevallos.

## 2022-02-05 NOTE — PROGRESS NOTES
[facility] Medicine Daily Progress Note    Date of Service  2/5/2022    Chief Complaint  Debby Carrizales is a 41 y.o. female admitted 2/4/2022 with vaginal bleeding and severe anemia    Hospital Course  Debby Carrizales is a 41 y.o. female with past medical history of lupus, end-stage renal disease with 4-year history of dialysis and undergoing peritoneal dialysis daily , hypertension, seizure disorder who presented 2/4/2022 as a transfer from [facility] ED.  Patient presented to emergency department at [facility] due to 2-week history of increased vaginal bleeding, patient was found to have hemoglobin 3.9, creatinine 16.73, potassium 6.2 upon arrival to the ED and was transferred to [facility] as no Gyn consult was available at [facility].  Nephrology was consulted prior to transfer.  The patient received blood transfusions, she denies prior clotting disorders.  Gynecology has been consulted.    Interval Problem Update  Patient seen and examined today.    Patient tolerating treatment and therapies.  All Data, Medication data reviewed.  Case discussed with nursing as available.  Plan of Care reviewed with patient and notified of changes.  2/5 the patient complains of back pain, still has vaginal bleeding but not excessive, there is clots, case discussed with gynecology, additional blood products ordered since the patient had a hemoglobin of 5.7 still.  Reviewed TEG, patient now scheduled for additional peritoneal dialysis.  The patient has a history of fibromyalgia and seizure in addition to the above-mentioned diagnosis.  No recent exacerbation per the patient.    I have personally seen and examined the patient at bedside. I discussed the plan of care with patient.    Consultants/Specialty  nephrology, gynecology, critical care    Code Status  Full Code    Disposition  Patient is not medically cleared for discharge.   Anticipate discharge to to home with close outpatient follow-up.  I have  placed the appropriate orders for post-discharge needs.    Review of Systems  Review of Systems   Constitutional: Positive for malaise/fatigue. Negative for chills and fever.   HENT: Negative.    Eyes: Negative.    Respiratory: Negative.  Negative for cough.    Cardiovascular: Negative.  Negative for chest pain and palpitations.   Gastrointestinal: Positive for abdominal pain. Negative for heartburn, nausea and vomiting.   Genitourinary: Negative.  Negative for dysuria and frequency.        Vaginal bleeding   Musculoskeletal: Positive for back pain. Negative for neck pain.   Skin: Negative.  Negative for itching and rash.   Neurological: Negative.  Negative for dizziness, focal weakness, weakness and headaches.   Endo/Heme/Allergies: Negative.  Negative for polydipsia. Does not bruise/bleed easily.   Psychiatric/Behavioral: Negative for depression. The patient is nervous/anxious.         Physical Exam  Temp:  [35.9 °C (96.6 °F)-36.7 °C (98.1 °F)] 36.4 °C (97.6 °F)  Pulse:  [] 70  Resp:  [9-25] 13  BP: (104-156)/() 134/70  SpO2:  [87 %-100 %] 99 %    Physical Exam  Vitals and nursing note reviewed.   Constitutional:       Appearance: She is well-developed. She is obese. She is ill-appearing. She is not diaphoretic.      Interventions: Nasal cannula in place.   HENT:      Head: Normocephalic and atraumatic.      Nose: Nose normal.   Eyes:      Conjunctiva/sclera: Conjunctivae normal.      Pupils: Pupils are equal, round, and reactive to light.   Neck:      Thyroid: No thyromegaly.      Vascular: No JVD.   Cardiovascular:      Rate and Rhythm: Normal rate and regular rhythm.      Heart sounds: Normal heart sounds. No friction rub. No gallop.    Pulmonary:      Effort: Pulmonary effort is normal.      Breath sounds: Normal breath sounds. No wheezing or rales.   Abdominal:      General: Bowel sounds are normal. There is no distension.      Palpations: Abdomen is soft. There is no mass.      Tenderness: There  is abdominal tenderness. There is no guarding or rebound.   Musculoskeletal:         General: Tenderness present. Normal range of motion.      Cervical back: Normal range of motion and neck supple.   Lymphadenopathy:      Cervical: No cervical adenopathy.   Skin:     General: Skin is warm and dry.      Coloration: Skin is pale.   Neurological:      Mental Status: She is oriented to person, place, and time. She is lethargic.      Cranial Nerves: No cranial nerve deficit.      Comments: Muscle jerking  Tremors   Psychiatric:         Behavior: Behavior normal.         Fluids    Intake/Output Summary (Last 24 hours) at 2/5/2022 1406  Last data filed at 2/5/2022 1200  Gross per 24 hour   Intake 1488.75 ml   Output 100 ml   Net 1388.75 ml       Laboratory  Recent Labs     02/04/22  1901 02/04/22  2330 02/05/22  0740   WBC 2.9*  --  2.5*   RBC 1.28*  --  1.81*   HEMOGLOBIN 3.9*  --  5.7*   HEMATOCRIT 13.1*  --  17.7*   .3*  --  97.8   MCH 30.5  --  31.5   MCHC 29.8*  --  32.2*   RDW 51.6*  --  50.4*   PLATELETCT 136* 129* 111*   MPV 9.9  --  10.5     Recent Labs     02/04/22  1901 02/05/22  0203   SODIUM 140  --    POTASSIUM 6.2* 5.9*   CHLORIDE 98  --    CO2 22  --    GLUCOSE 98  --    BUN 95*  --    CREATININE 16.73*  --    CALCIUM 7.0*  --      Recent Labs     02/04/22 1901   APTT 63.1*   INR 1.13               Imaging  No orders to display        Assessment/Plan  * Hyperkalemia- (present on admission)  Assessment & Plan  Calcium gluconate, insulin, and dextrose fluids given at [facility] prior to transfer  Continue to monitor  Admitted with telemetry  EKG    Dysfunctional uterine bleeding- (present on admission)  Assessment & Plan  Megace 800 mg daily ordered, estrogen as per consult with Gyn, Dr. Hernandez  Patient with past medical history of irregular menstruation and states that this is the first bleeding she has experienced in the past 3 years.   Close follow-up on H&H  The patient will need Depo-Provera on  discharge  Appreciate gynecology input    Port-A-Cath in place- (present on admission)  Assessment & Plan  Appears functional    Seizure disorder (HCC)- (present on admission)  Assessment & Plan  Continue home meds  Seizure precautions in place    Elevated troponin- (present on admission)  Assessment & Plan  Troponin 178 in [facility] ED  Repeat troponin ordered  Denies chest pain  Likely 2/2 demand ischemia  EKG showing low voltage NSR  Admitted with telemetry    Pancytopenia (HCC)  Assessment & Plan  COD and transfusion ordered   Continue to monitor and transfuse as indicated  Attempt to maintain hemoglobin above seven    ESRD (end stage renal disease) on dialysis (HCC)- (present on admission)  Assessment & Plan  Nephrology consulted prior to transfer  Receives peritoneal dialysis daily  Plan for dialysis today    Lupus (systemic lupus erythematosus) (HCC)- (present on admission)  Assessment & Plan  History of,    Blood loss anemia  Assessment & Plan  Severe on top of chronic anemia with anemia of chronic disease  Transfuse for hemoglobin below seven     Plan  Additional transfusion  Peritoneal dialysis  Agree with estrogen treatment as per recommendations from gynecology  Patient currently not a candidate for anticoagulation or DVT prophylaxis  Close monitoring on hemodynamic and hematologic status  See orders  Medically complex high risk patient      VTE prophylaxis: pharmacologic prophylaxis contraindicated due to Bleeding    I have performed a physical exam and reviewed and updated ROS and Plan today (2/5/2022). In review of yesterday's note (2/4/2022), there are no changes except as documented above.      Please note that this dictation was created using voice recognition software. I have made every reasonable attempt to correct obvious errors, but I expect that there are errors of grammar and possibly context that I did not discover before finalizing the note.

## 2022-02-05 NOTE — CONSULTS
Nephrology Consultation    Date of Service  2/5/2022    Referring Physician  Asif Chaudhari M.D.    Consulting Physician  Bora Douglas M.D.    Reason for Consultation  Management of ESRD on PD    History of Presenting Illness  41 y.o. female with a history of lupus, ESRD on dialysis, previously on hemodialysis, but with limited vascular access options, transition to peritoneal dialysis in the summer 2021 who presented 2/4/2022 with 1 month of abnormal uterine bleeding, and weakness.    The patient complains of heavy menstruation over the last month.  She says she has not had any menstrual bleeding in the last few years prior to this episode.  She thought it would stop, but it kept getting worse, eventually she developed weakness and confusion, and she came into the ER.  She was found to be severely anemic, and transferred to [facility] for further care.  The patient says she does not desire pregnancy.  She otherwise denies chest pain, shortness of breath, headache, nausea, vomiting, abdominal pain.  She has been receiving blood transfusion since admission.    Regarding her ESRD, she is essentially anuric.  She transition to peritoneal dialysis in summer 2021.  She is usually under the care of my colleague Dr. Najjar.  She does 4 manual exchanges at home of 2.5 L.  She is not using a cycler overnight, because she is concerned about alarms and not getting good sleep.  I explained to her that we will have to use the cycler machine while in the hospital due to not having enough staff to do manual exchanges during the day.    Review of Systems  Review of Systems   Constitutional: Negative for fever.   Respiratory: Negative for shortness of breath.    Cardiovascular: Negative for chest pain.   Gastrointestinal: Negative for abdominal pain.   Endo/Heme/Allergies:        Menstrual bleeding prior to admission   All other systems reviewed and are negative.      Past Medical History  Past Medical History:    Diagnosis Date   • Anemia    • Arthritis     all joints,r/t lupus   • Avascular necrosis of bones of both hips ([facility]) 10/10/2016   • Blood clotting disorder ([facility])     in AV Graft   • Bowel habit changes     constipation   • Closed nondisplaced fracture of base of fifth metacarpal bone of left hand 2/1/2019   • Clostridium difficile colitis 5/3/2011   • Continuous ambulatory peritoneal dialysis status ([facility])    • Dialysis patient    • Environmental and seasonal allergies    • ESBL (extended spectrum beta-lactamase) producing bacteria infection 8/25/2014   • Fall    • Fibromyalgia 11/30/2021    6/10   • High anion gap metabolic acidosis 4/2/2011   • Hypertension    • Lupus ([facility])    • Pneumonia    • Psychiatric disorder     anxiety, depression   • Pyelonephritis 11/21/2017   • Renal failure     Dr. Najjar   • Sepsis due to pneumonia ([facility]) 6/7/2018   • Status epilepticus ([facility])     last seizure        Surgical History  Past Surgical History:   Procedure Laterality Date   • ID REMOVAL TUNNELED CV CATH Right 12/1/2021    Procedure: REMOVAL, TUNNELED DIALYSIS CATHETER;  Surgeon: Víctor Berg M.D.;  Location: [facility];  Service: Vascular   • CATH PLACEMENT Left 12/1/2021    Procedure: INSERTION, CATHETER - MEDIPORT;  Surgeon: Víctor Berg M.D.;  Location: [facility];  Service: Vascular   • CATH PLACEMENT CAPD N/A 5/5/2021    Procedure: INSERTION, CATHETER, CAPD;  Surgeon: Víctor Berg M.D.;  Location: [facility];  Service: Vascular   • THROMBECTOMY Right 12/7/2020    Procedure: Right upper extremity arteriovenous graft thrombectomy;  Surgeon: Daniel Poole M.D.;  Location: [facility];  Service: Vascular   • CATH PLACEMENT Left 12/7/2020    Procedure: port-a-catheter removal;  Surgeon: Daniel Poole M.D.;  Location: [facility];  Service: Vascular   • GAYATHRI N/A 8/20/2019    Procedure: ECHOCARDIOGRAM, TRANSESOPHAGEAL;  Surgeon: Marta Elaine,  M.D.;  Location: [facility];  Service: Cardiac   • AV FISTULA REVISION Right 8/11/2018    Procedure: AV FISTULA REVISION- Graft and Thrombectomy;  Surgeon: Shabbir Ardon M.D.;  Location: [facility];  Service: General   • AV FISTULA THROMBOLYSIS Right 8/11/2018    Procedure: AV FISTULA THROMBOLYSIS;  Surgeon: Shabbir Ardon M.D.;  Location: [facility];  Service: General   • AV FISTULA CREATION Right 12/9/2017    Procedure: AV FISTULA CREATION-ARM FOR GRAFT;  Surgeon: Lesly Jansen M.D.;  Location: [facility];  Service: General   • THROMBECTOMY  12/9/2017    Procedure: THROMBECTOMY;  Surgeon: Lesly Jansen M.D.;  Location: [facility];  Service: General   • THROMBECTOMY Right 8/21/2016    Procedure: THROMBECTOMY - right AV fistula graft with grams;  Surgeon: Shabbir Ardon M.D.;  Location: [facility];  Service:    • ANGIOPLASTY BALLOON  8/21/2016    Procedure: ANGIOPLASTY BALLOON;  Surgeon: Shabbir Ardon M.D.;  Location: [facility];  Service:    • THROMBECTOMY Right 8/20/2016    Procedure: THROMBECTOMY AV GRAFT;  Surgeon: Shabbir Ardon M.D.;  Location: [facility];  Service:    • AV FISTULA CREATION Right 7/12/2016    Procedure: AV FISTULA CREATION WITH GRAFT BRACHIAL AXILLARY;  Surgeon: Shabbir Ardon M.D.;  Location: [facility];  Service:    • CLOSED REDUCTION Right 7/5/2016    Procedure: CLOSED REDUCTION- Hip ;  Surgeon: Michael Holman M.D.;  Location: [facility];  Service:    • HIP ARTHROPLASTY TOTAL Right 1/18/2016    Procedure: HIP ARTHROPLASTY TOTAL;  Surgeon: Michael Holman M.D.;  Location: [facility];  Service:    • AV FISTULA CREATION  2/3/2015    Performed by Shabbir Ardon M.D. at [facility]   • AV FISTULA CREATION  11/14/2014    Performed by Shabbir Ardon M.D. at [facility]   • AV FISTULA CREATION   9/9/2014    Performed by Shabbir Ardon M.D. at SURGERY [facility]   • CATH PLACEMENT  9/9/2014    Performed by Shabbir Ardon M.D. at SURGERY [facility]   • OTHER  5/2011    tracheostomy   • GASTROSCOPY-ENDO  4/27/2011    Performed by PALMIRA DOAN at ENDOSCOPY [facility]   • COLONOSCOPY WITH BIOPSY  4/20/2011    Performed by ALCIRA CRUZ at ENDOSCOPY [facility]   • GASTROSCOPY-ENDO  4/18/2011    Performed by ALCIRA CRUZ at ENDOSCOPY [facility]   • GASTROSCOPY-ENDO  4/10/2011    Performed by SYED JURADO at ENDOSCOPY [facility]   • SCLEROTHERAPHY  4/10/2011    Performed by SYED JURADO at ENDOSCOPY [facility]   • OTHER ABDOMINAL SURGERY      kidney biopsy   • TRACHEOSTOMY         Family History  Family History   Problem Relation Age of Onset   • Heart Disease Mother    • Cancer Father        Social History  Social History     Socioeconomic History   • Marital status: Single     Spouse name: Not on file   • Number of children: Not on file   • Years of education: Not on file   • Highest education level: Not on file   Occupational History   • Not on file   Tobacco Use   • Smoking status: Former Smoker     Packs/day: 0.50     Years: 18.00     Pack years: 9.00     Types: Cigarettes     Quit date: 6/13/2011     Years since quitting: 10.6   • Smokeless tobacco: Never Used   • Tobacco comment: vaping now   Vaping Use   • Vaping Use: Some days   • Substances: Flavoring   • Devices: Disposable   Substance and Sexual Activity   • Alcohol use: No     Alcohol/week: 0.0 oz   • Drug use: No     Types: Marijuana   • Sexual activity: Not Currently     Partners: Male   Other Topics Concern   •  Service No   • Blood Transfusions Yes   • Caffeine Concern No   • Occupational Exposure No   • Hobby Hazards No   • Sleep Concern Yes   • Stress Concern Yes   • Weight Concern Yes   • Special Diet Yes   • Back Care No   • Exercise Yes   • Bike Helmet No   •  Seat Belt Yes   • Self-Exams Yes   Social History Narrative   • Not on file     Social Determinants of Health     Financial Resource Strain:    • Difficulty of Paying Living Expenses: Not on file   Food Insecurity:    • Worried About Running Out of Food in the Last Year: Not on file   • Ran Out of Food in the Last Year: Not on file   Transportation Needs:    • Lack of Transportation (Medical): Not on file   • Lack of Transportation (Non-Medical): Not on file   Physical Activity:    • Days of Exercise per Week: Not on file   • Minutes of Exercise per Session: Not on file   Stress:    • Feeling of Stress : Not on file   Social Connections:    • Frequency of Communication with Friends and Family: Not on file   • Frequency of Social Gatherings with Friends and Family: Not on file   • Attends Zoroastrian Services: Not on file   • Active Member of Clubs or Organizations: Not on file   • Attends Club or Organization Meetings: Not on file   • Marital Status: Not on file   Intimate Partner Violence:    • Fear of Current or Ex-Partner: Not on file   • Emotionally Abused: Not on file   • Physically Abused: Not on file   • Sexually Abused: Not on file   Housing Stability:    • Unable to Pay for Housing in the Last Year: Not on file   • Number of Places Lived in the Last Year: Not on file   • Unstable Housing in the Last Year: Not on file       Medications  Current Facility-Administered Medications   Medication Dose Route Frequency Provider Last Rate Last Admin   • senna-docusate (PERICOLACE or SENOKOT S) 8.6-50 MG per tablet 2 Tablet  2 Tablet Oral BID Barb Mota M.D.        And   • polyethylene glycol/lytes (MIRALAX) PACKET 1 Packet  1 Packet Oral QDAY PRN Barb Mota M.D.        And   • magnesium hydroxide (MILK OF MAGNESIA) suspension 30 mL  30 mL Oral QDAY PRN Barb Mota M.D.        And   • bisacodyl (DULCOLAX) suppository 10 mg  10 mg Rectal QDAY PRN Barb Mota M.D.       • Pharmacy Consult Request  - to monitor for nephrotoxic agents  1 Each Other PHARMACY TO DOSE Barb Mota M.D.       • ondansetron (ZOFRAN) syringe/vial injection 4 mg  4 mg Intravenous Q4HRS PRN Barb Mota M.D.       • ondansetron (ZOFRAN ODT) dispertab 4 mg  4 mg Oral Q4HRS PRN Barb Mota M.D.       • promethazine (PHENERGAN) tablet 12.5-25 mg  12.5-25 mg Oral Q4HRS PRN Barb Mota M.D.       • promethazine (PHENERGAN) suppository 12.5-25 mg  12.5-25 mg Rectal Q4HRS PRN Barb Mota M.D.       • prochlorperazine (COMPAZINE) injection 5-10 mg  5-10 mg Intravenous Q4HRS PRN Barb Mota M.D.       • acetaminophen (Tylenol) tablet 650 mg  650 mg Oral Q6HRS PRN Barb Mota M.D.   650 mg at 02/05/22 0942   • Pharmacy Consult Request ...Pain Management Review 1 Each  1 Each Other PHARMACY TO DOSE Barb Mota M.D.       • labetalol (NORMODYNE/TRANDATE) injection 10 mg  10 mg Intravenous Q4HRS PRN Barb Mota M.D.       • amLODIPine (NORVASC) tablet 10 mg  10 mg Oral DAILY Barb Mota M.D.   10 mg at 02/05/22 0524   • lacosamide (VIMPAT) tablet 100 mg  100 mg Oral BID Barb Mota M.D.   100 mg at 02/05/22 1302   • sevelamer carbonate (RENVELA) tablet 1,600-3,200 mg  1,600-3,200 mg Oral See Admin Instructions Barb Mota M.D.       • traZODone (DESYREL) tablet 50 mg  50 mg Oral QHS Barb Mota M.D.       • tizanidine (ZANAFLEX) tablet 8 mg  8 mg Oral HS PRN Barb Mota M.D.       • oxyCODONE immediate-release (ROXICODONE) tablet 20 mg  20 mg Oral 4X/DAY PRN Barb Mota M.D.   20 mg at 02/05/22 1302   • fentaNYL (SUBLIMAZE) injection 50 mcg  50 mcg Intravenous Q6HRS PRN Barb Mota M.D.       • [START ON 2/6/2022] megestrol (MEGACE) 40 MG/ML suspension 800 mg  800 mg Oral DAILY Dannie Shaikh M.D.       • pregabalin (LYRICA) capsule 50 mg  50 mg Oral BID Dannie Shaikh M.D.   50 mg at 02/05/22 1016   • estrogens, conjegated (PREMARIN) injection  "25 mg  25 mg Intravenous 4XDAY Hilda Hernandez M.D.   25 mg at 02/05/22 1202       Allergies  Allergies   Allergen Reactions   • Lorazepam [Ativan] Anxiety and Unspecified     Patient becomes severely paranoid and agitated, hallucinates   • Morphine Itching and Swelling     Tolerates Dilaudid  \"Swelling in mouth\"       Physical Exam  Temp:  [35.9 °C (96.6 °F)-36.5 °C (97.7 °F)] 36.4 °C (97.6 °F)  Pulse:  [63-83] 70  Resp:  [9-25] 13  BP: (104-156)/() 134/70  SpO2:  [87 %-100 %] 99 %    Physical Exam  Constitutional:       General: She is not in acute distress.     Appearance: She is well-developed.   HENT:      Head: Normocephalic and atraumatic.      Mouth/Throat:      Mouth: Mucous membranes are moist.      Pharynx: Oropharynx is clear. No oropharyngeal exudate.   Eyes:      General: No scleral icterus.     Extraocular Movements: Extraocular movements intact.   Neck:      Trachea: No tracheal deviation.   Cardiovascular:      Rate and Rhythm: Normal rate and regular rhythm.      Heart sounds: Normal heart sounds. No murmur heard.      Pulmonary:      Effort: Pulmonary effort is normal.      Breath sounds: No stridor. No rales.   Abdominal:      Palpations: Abdomen is soft.      Tenderness: There is no abdominal tenderness.   Musculoskeletal:         General: Normal range of motion.      Cervical back: Normal range of motion. No rigidity.      Right lower leg: Edema (1+) present.      Left lower leg: Edema (1+) present.   Skin:     General: Skin is warm and dry.   Neurological:      General: No focal deficit present.      Mental Status: She is alert and oriented to person, place, and time.   Psychiatric:         Mood and Affect: Mood normal.         Behavior: Behavior normal.     Dialysis access: PD catheter.  Patient has evidence of multiple dialysis access scars on her right arm    Fluids  Date 02/05/22 0700 - 02/06/22 0659   Shift 8241-0058 8459-5810 2166-9401 24 Hour Total   INTAKE   I.V. 238.8   238.8   Blood " 600   600   IV Piggyback 50   50   Shift Total 888.8   888.8   OUTPUT   Urine 0   0   Shift Total 0   0   Weight (kg) 85.1 85.1 85.1 85.1       Laboratory  Labs reviewed, pertinent labs below.  Recent Labs     02/04/22 1901 02/04/22  2330 02/05/22  0740   WBC 2.9*  --  2.5*   RBC 1.28*  --  1.81*   HEMOGLOBIN 3.9*  --  5.7*   HEMATOCRIT 13.1*  --  17.7*   .3*  --  97.8   MCH 30.5  --  31.5   MCHC 29.8*  --  32.2*   RDW 51.6*  --  50.4*   PLATELETCT 136* 129* 111*   MPV 9.9  --  10.5     Recent Labs     02/04/22 1901 02/05/22  0203   SODIUM 140  --    POTASSIUM 6.2* 5.9*   CHLORIDE 98  --    CO2 22  --    GLUCOSE 98  --    BUN 95*  --    CREATININE 16.73*  --    CALCIUM 7.0*  --      Recent Labs     02/04/22 1901   APTT 63.1*   INR 1.13            URINALYSIS:  Lab Results   Component Value Date/Time    COLORURINE Yellow 08/12/2019 1459    CLARITY Clear 08/12/2019 1459    SPECGRAVITY 1.020 08/12/2019 1459    PHURINE 7.5 08/12/2019 1459    KETONES 15 (A) 08/12/2019 1459    PROTEINURIN >=300 (A) 08/12/2019 1459    BILIRUBINUR Negative 08/12/2019 1459    UROBILU 0.2 01/01/2019 0705    NITRITE Negative 08/12/2019 1459    LEUKESTERAS Negative 08/12/2019 1459    OCCULTBLOOD Small (A) 08/12/2019 1459     UPC  Lab Results   Component Value Date/Time    TOTPROTUR 65.0 (H) 01/21/2015 1520      Lab Results   Component Value Date/Time    CREATININEU 65.60 01/21/2015 1520       Imaging interpreted by radiologist. Imaging reports reviewed with pertinent findings below  No orders to display         Assessment/Plan  41 y.o. female with a history of lupus, ESRD on dialysis, previously on hemodialysis, but with limited vascular access options, transition to peritoneal dialysis in the summer 2021 who presented 2/4/2022 with 1 month of abnormal uterine bleeding, and weakness.    1.  ESRD on peritoneal dialysis, anuric.  Continue PD while inpatient, using cycler PD overnight 10 hours, 3 x 2.5 L exchanges with 2.5 L last fill.   Given volume overload, will order 2.5% solution.  Check labs daily.    2.  Access: PD catheter.  Patient has a history of multiple failed AV access for hemodialysis.  She has a left chest port in place due to difficulty with IV access    3.  Acute blood loss anemia, from abnormal uterine bleeding.  Defer management to gynecology.  Request consultation with nephrology on surgical options, please give consideration to endometrial ablation so as not to interrupt her PD therapy with any laparoscopic procedures.    4.  Anemia of chronic disease.  Usually on 10,000 and Retacrit every other week.  Increase dose to 15,000 units of Retacrit weekly while inpatient.  Check CBC daily.    5.  Hypertension, uncontrolled.  Given this patient with an uric kidney failure, recommend discontinue IV fluids.    6.  Hyperkalemia, noted on admission.  This should improve with dialysis.  Low potassium diet.  Check labs daily.    7.  Hyperphosphatemia, due to ESRD.  Continue home phosphorus binders.  Low phosphorus diet.    Following      Bora Douglas MD  Nephrology

## 2022-02-05 NOTE — H&P
Hospital Medicine History & Physical Note    Date of Service  2/5/2022    Primary Care Physician  Sushila Manzano M.D.    Consultants  Nephrology consulted prior to transfer  Intensivist, Dr. Delcid consulted prior to transfer, and Dr. Francis, intensivist consulted upon arrival    Code Status  Full Code    Chief Complaint  DUB    History of Presenting Illness  Debby Carrizales is a 41 y.o. female with past medical history of lupus, end-stage renal disease with 4-year history of dialysis and undergoing peritoneal dialysis daily , hypertension, seizure disorder who presented 2/4/2022 as a transfer from [facility] ED.  Patient presented to emergency department at [facility] due to 2-week history of increased vaginal bleeding, patient was found to have hemoglobin 3.9, creatinine 16.73, potassium 6.2 upon arrival to the ED and was transferred to [facility] as no Gyn consult was available at [facility].  Nephrology was consulted prior to transfer as well as Dr. Delcid, intensivist.  Patient was admitted to IMCU upon arrival.     Dr. Hilda Hernandez, Gynecology consulted upon arrival and will see patient in AM, Megace 800mg is ordered stat per Dr. Hernandez.       I discussed the plan of care with patient.    Review of Systems  Review of Systems   Musculoskeletal: Positive for back pain and myalgias.   All other systems reviewed and are negative.      Past Medical History   has a past medical history of Anemia, Arthritis, Avascular necrosis of bones of both hips (HCC) (10/10/2016), Blood clotting disorder (Formerly [facility]), Bowel habit changes, Closed nondisplaced fracture of base of fifth metacarpal bone of left hand (2/1/2019), Clostridium difficile colitis (5/3/2011), Continuous ambulatory peritoneal dialysis status (Formerly [facility]), Dialysis patient, Environmental and seasonal allergies, ESBL (extended spectrum beta-lactamase) producing bacteria infection (8/25/2014), Fall, Fibromyalgia (11/30/2021), High anion gap metabolic acidosis  "(4/2/2011), Hypertension, Lupus (HCC), Pneumonia (), Psychiatric disorder, Pyelonephritis (11/21/2017), Renal failure, Sepsis due to pneumonia (HCC) (6/7/2018), and Status epilepticus (HCC).    Surgical History   has a past surgical history that includes gastroscopy-endo (4/10/2011); sclerotheraphy (4/10/2011); gastroscopy-endo (4/18/2011); colonoscopy with biopsy (4/20/2011); gastroscopy-endo (4/27/2011); other (5/2011); other abdominal surgery; av fistula creation (9/9/2014); cath placement (9/9/2014); av fistula creation (11/14/2014); av fistula creation (2/3/2015); hip arthroplasty total (Right, 1/18/2016); av fistula creation (Right, 7/12/2016); thrombectomy (Right, 8/20/2016); thrombectomy (Right, 8/21/2016); angioplasty balloon (8/21/2016); tracheostomy; av fistula creation (Right, 12/9/2017); thrombectomy (12/9/2017); av fistula revision (Right, 8/11/2018); av fistula thrombolysis (Right, 8/11/2018); tahir (N/A, 8/20/2019); thrombectomy (Right, 12/7/2020); cath placement (Left, 12/7/2020); cath placement capd (N/A, 5/5/2021); closed reduction (Right, 7/5/2016); pr removal tunneled cv cath (Right, 12/1/2021); and cath placement (Left, 12/1/2021).     Family History  family history includes Cancer in her father; Heart Disease in her mother.   Family history reviewed with patient. There is no family history that is pertinent to the chief complaint.     Social History   reports that she quit smoking about 10 years ago. Her smoking use included cigarettes. She has a 9.00 pack-year smoking history. She has never used smokeless tobacco. She reports that she does not drink alcohol and does not use drugs.    Allergies  Allergies   Allergen Reactions   • Lorazepam [Ativan] Anxiety and Unspecified     Patient becomes severely paranoid and agitated, hallucinates   • Morphine Itching and Swelling     Tolerates Dilaudid  \"Swelling in mouth\"       Medications  Prior to Admission Medications   Prescriptions Last Dose " Informant Patient Reported? Taking?   EPOGEN 05582 UNIT/ML Solution   Yes No   Oxycodone HCl 20 MG Tab   No No   Sig: Take 1 Tablet by mouth 4 times a day as needed (pain) for up to 28 days.   Oxycodone HCl 20 MG Tab   No No   Sig: Take 1 Tablet by mouth 4 times a day as needed (pain) for up to 30 days.   RENVELA 800 MG Tab tablet  Patient No No   Sig: TAKE 4 TABLETS BY MOUTH THREE TIMES DAILY WITH MEALS, AND 2 TABLETS BY MOUTH WITH SNACKS   Patient taking differently: Take 1,600-3,200 mg by mouth see administration instructions.   amLODIPine (NORVASC) 10 MG Tab  Patient No No   Sig: Take 1 tablet by mouth every day.   lacosamide (VIMPAT) 100 MG Tab tablet  Patient Yes No   Sig: Take 100 mg by mouth 2 times a day. Indications: seizures   lactulose 10 GM/15ML Solution  Patient Yes No   Sig: Take 20 g by mouth 1 time a day as needed (Constipation). 30 mL = 20 mg.   lisinopril (PRINIVIL) 20 MG Tab  Patient Yes No   Sig: Take 20 mg by mouth at bedtime. Indications: High Blood Pressure Disorder   pregabalin (LYRICA) 50 MG capsule   No No   Sig: Take 1 Capsule by mouth 2 times a day for 30 days.   promethazine (PHENERGAN) 25 MG Tab  Patient No No   Sig: Take 1 Tablet by mouth every 8 hours as needed for Nausea/Vomiting.   tizanidine (ZANAFLEX) 4 MG Tab  Patient No No   Sig: TAKE 2 TABLETS BY MOUTH AT BEDTIME AS NEEDED FOR MUSCLE SPASMS   Patient taking differently: Take 8 mg by mouth at bedtime as needed.   traZODone (DESYREL) 50 MG Tab  Patient No No   Sig: TAKE 1 TABLET BY MOUTH EVERY NIGHT AT BEDTIME   Patient taking differently: Take 50 mg by mouth at bedtime.      Facility-Administered Medications: None       Physical Exam  Temp:  [36.3 °C (97.4 °F)-36.7 °C (98.1 °F)] 36.3 °C (97.4 °F)  Pulse:  [] 76  Resp:  [16-25] 21  BP: (104-156)/() 115/65  SpO2:  [87 %-99 %] 92 %  Blood Pressure: 104/55   Temperature: 36.5 °C (97.7 °F)   Pulse: 80   Respiration: 17   Pulse Oximetry: 95 %       Physical Exam      Constitutional:  Patient is chronically ill-appearing  HENT: Normocephalic, no obvious evidence of acute trauma.  Eyes: No scleral icterus. Normal conjunctiva   Neck: Comfortable movement without any obvious restriction in the range of motion.  Cardiovascular:  Tachycardia  Thorax & Lungs: No respiratory distress. No wheezing, rales or rhonchi heard on ausculation.  there is no obvious chest wall tenderness. I appreciate normal air movement throughout.   Abdomen: The abdomen is not visibly distended. Upon palpation, I find it to be without tenderness.  No mass appreciated.  Skin: The exposed portions of skin reveal no obvious rash or other abnormalities.  Extremities/Musculoskeletal:  2+ pitting edema at bilateral lower extremities  Neurologic: Alert & oriented. No focal deficits observed.   Psychiatric: Normal affect appropriate for the clinical situation.    Laboratory:  Recent Labs     02/04/22 1901 02/04/22  2330   WBC 2.9*  --    RBC 1.28*  --    HEMOGLOBIN 3.9*  --    HEMATOCRIT 13.1*  --    .3*  --    MCH 30.5  --    MCHC 29.8*  --    RDW 51.6*  --    PLATELETCT 136* 129*   MPV 9.9  --      Recent Labs     02/04/22  1901   SODIUM 140   POTASSIUM 6.2*   CHLORIDE 98   CO2 22   GLUCOSE 98   BUN 95*   CREATININE 16.73*   CALCIUM 7.0*     Recent Labs     02/04/22  1901   ALTSGPT 6   ASTSGOT 10*   ALKPHOSPHAT 105*   TBILIRUBIN 0.2   GLUCOSE 98     Recent Labs     02/04/22  1901   APTT 63.1*   INR 1.13     No results for input(s): NTPROBNP in the last 72 hours.      Recent Labs     02/04/22  1901   TROPONINT 178*       Imaging:  No orders to display         Assessment/Plan:  I anticipate this patient will require at least two midnights for appropriate medical management, necessitating inpatient admission.    * Hyperkalemia- (present on admission)  Assessment & Plan  Calcium gluconate, insulin, and dextrose fluids given at [facility] prior to transfer  Continue to monitor  Admitted with  telemetry  EKG    Dysfunctional uterine bleeding- (present on admission)  Assessment & Plan  Megace 800 mg daily ordered stat upon consult with Gyn, Dr. Hernandez  Patient with past medical history of irregular menstruation and states that this is the first bleeding she has experienced in the past 3 years.  Patient denies history of pregnancy.    Pancytopenia (HCC)  Assessment & Plan  COD and transfusion ordered upon arrival  Continue to monitor and transfuse as indicated  WBC 2.9, hemoglobin 3.9, platelets 136, continue to monitor every 6 hours    Seizure disorder (HCC)- (present on admission)  Assessment & Plan  Continue home meds  Seizure precautions in place    Elevated troponin- (present on admission)  Assessment & Plan  Troponin 178 in [facility]  Repeat troponin ordered  Denies chest pain  Likely 2/2 demand ischemia, awaiting transfusions currently  EKG showing low voltage NSR  Admitted with telemetry    ESRD (end stage renal disease) on dialysis (HCC)- (present on admission)  Assessment & Plan  Nephrology consulted prior to transfer  Receives peritoneal dialysis daily  Plan for dialysis today      VTE prophylaxis: SCDs/TEDs

## 2022-02-05 NOTE — ASSESSMENT & PLAN NOTE
Severe on top of chronic anemia with anemia of chronic disease  Transfuse for hemoglobin below seven

## 2022-02-05 NOTE — PROGRESS NOTES
[facility] Progress Note    CAPD ordered by Dr. Douglas. Connected aseptically to 1.5 %PD solution, fill 2500 ml.    PD effluent clear but light pink in color. Drained 2300 ml (per pt last fill 2500ml). MD aware.    NET: -200ml      Pt stable, VSS, alert and oriented.     PD exit site CDI, No s/sx of infection, dressing changed.       Report given to KALEY Woodruff RN.     Call [facility]/On Call at [phone] for further assistance.

## 2022-02-05 NOTE — ASSESSMENT & PLAN NOTE
Megace 800 mg daily ordered, estrogen as per consult with Gyn, Dr. Hernandez  Patient with past medical history of irregular menstruation and states that this is the first bleeding she has experienced in the past 3 years.   Close follow-up on H&H  The patient will need Depo-Provera on discharge  Appreciate gynecology input

## 2022-02-05 NOTE — ASSESSMENT & PLAN NOTE
COD and transfusion ordered   Continue to monitor and transfuse as indicated  Attempt to maintain hemoglobin above seven

## 2022-02-05 NOTE — ED PROVIDER NOTES
ED Provider Note    Scribed for Eunice Cevallos M.D. by Zachery Cruz. 2/4/2022  6:13 PM    Primary care provider: Sushila Manzano M.D.  Means of arrival: Walk in  History obtained from: Patient  History limited by: None    CHIEF COMPLAINT  Chief Complaint   Patient presents with   • Vaginal Bleeding     Heavy Intermitent x 6 wks   • Syncope     Feels faint anytime she stands    • Back Pain     radiates around to chest   • Shortness of Breath       HPI  Debby Carrizales is a 41 y.o. female with a history of lupus who presents to the Emergency Department for intermittent vaginal bleeding onset 6 weeks ago. She notes she has been bleeding heavily for the last 2 weeks, going through 10 pads a day and passes clots often. Patient endorses associated back pain, shaking, hip pain, swelling to legs, general weakness, and subjective fever. She denies sore throat, cough, chest pain, or shortness of breath. Patient notes she has chronic back and hip pain and treats it with oxycodone. She has an irregular menstrual cycle and states she is unsure if she is pregnant. Patient notes she is concerned that she might be anemic. Patient has been on dialysis for 4 years and started peritoneal dialysis this summer.    REVIEW OF SYSTEMS  HEENT:  No ear pain, congestion, or sore throat   EYES: no discharge, redness, or vision changes  CARDIAC: no chest pain, no palpitations    PULMONARY: no shortness of breath, cough, or congestion   GI: no vomiting, diarrhea, or abdominal pain   : +Vaginal bleeding, passing clots, no dysuria, or hematuria   Neuro: +generalized weakness, shaking, no numbness, aphasia, or headache  Musculoskeletal: +Back pain, hip pain, leg swelling, deformity, or joint swelling  Endocrine: +Subjective fevers, no sweating, or weight loss   SKIN: no rash, erythema, or contusions     See history of present illness. All other systems are negative. C.    PAST MEDICAL HISTORY   has a past medical history of  Anemia, Arthritis, Avascular necrosis of bones of both hips (HCC) (10/10/2016), Blood clotting disorder ([facility]), Bowel habit changes, Closed nondisplaced fracture of base of fifth metacarpal bone of left hand (2/1/2019), Clostridium difficile colitis (5/3/2011), Continuous ambulatory peritoneal dialysis status ([facility]), Dialysis patient, Environmental and seasonal allergies, ESBL (extended spectrum beta-lactamase) producing bacteria infection (8/25/2014), Fall, Fibromyalgia (11/30/2021), High anion gap metabolic acidosis (4/2/2011), Hypertension, Lupus ([facility]), Pneumonia (), Psychiatric disorder, Pyelonephritis (11/21/2017), Renal failure, Sepsis due to pneumonia ([facility]) (6/7/2018), and Status epilepticus ([facility]).    SURGICAL HISTORY   has a past surgical history that includes gastroscopy-endo (4/10/2011); sclerotheraphy (4/10/2011); gastroscopy-endo (4/18/2011); colonoscopy with biopsy (4/20/2011); gastroscopy-endo (4/27/2011); other (5/2011); other abdominal surgery; av fistula creation (9/9/2014); cath placement (9/9/2014); av fistula creation (11/14/2014); av fistula creation (2/3/2015); hip arthroplasty total (Right, 1/18/2016); av fistula creation (Right, 7/12/2016); thrombectomy (Right, 8/20/2016); thrombectomy (Right, 8/21/2016); angioplasty balloon (8/21/2016); tracheostomy; av fistula creation (Right, 12/9/2017); thrombectomy (12/9/2017); av fistula revision (Right, 8/11/2018); av fistula thrombolysis (Right, 8/11/2018); tahir (N/A, 8/20/2019); thrombectomy (Right, 12/7/2020); cath placement (Left, 12/7/2020); cath placement capd (N/A, 5/5/2021); closed reduction (Right, 7/5/2016); removal tunneled cv cath (Right, 12/1/2021); and cath placement (Left, 12/1/2021).    SOCIAL HISTORY  Social History     Tobacco Use   • Smoking status: Former Smoker     Packs/day: 0.50     Years: 18.00     Pack years: 9.00     Types: Cigarettes     Quit date: 6/13/2011     Years since quitting: 10.6   • Smokeless tobacco: Never Used  "  • Tobacco comment: vaping now   Vaping Use   • Vaping Use: Some days   • Substances: Flavoring   • Devices: Disposable   Substance Use Topics   • Alcohol use: No     Alcohol/week: 0.0 oz   • Drug use: No     Types: Marijuana      Social History     Substance and Sexual Activity   Drug Use No   • Types: Marijuana       FAMILY HISTORY  Family History   Problem Relation Age of Onset   • Heart Disease Mother    • Cancer Father        CURRENT MEDICATIONS  Home Medications    None noted when reviewed         ALLERGIES  Allergies   Allergen Reactions   • Lorazepam [Ativan] Anxiety and Unspecified     Patient becomes severely paranoid and agitated, hallucinates   • Morphine Itching and Swelling     Tolerates Dilaudid  \"Swelling in mouth\"       PHYSICAL EXAM  VITAL SIGNS: /72   Pulse 88   Temp 36.7 °C (98.1 °F) (Temporal)   Resp 19   Ht 1.651 m (5' 5\")   Wt 84.6 kg (186 lb 8.2 oz)   SpO2 99%   BMI 31.04 kg/m²     Constitutional: Chronically ill appearing, Well developed, Well nourished, No acute distress.  HEENT: Normocephalic, Atraumatic,  external ears normal, pharynx pink,  Mucous  Membranes moist, No rhinorrhea or mucosal edema  Eyes: PERRL, EOMI, Pale conjunctiva, No discharge.   Neck: Normal range of motion, No tenderness, Supple, No stridor.   Lymphatic: No lymphadenopathy    Cardiovascular: Regular Rate and Rhythm, No murmurs,  rubs, or gallops.   Thorax & Lungs: Port in left chest, Lungs clear to auscultation bilaterally, No respiratory distress, No wheezes, rhales or rhonchi, No chest wall tenderness.   Abdomen: Bowel sounds normal, Soft, non tender, non distended,  No pulsatile masses., no rebound guarding or peritoneal signs.   Skin: Warm, Dry, No erythema, No rash,   Back:  No CVA tenderness,  No spinal tenderness, bony crepitance, step offs, or instability.   Neurologic: Alert & oriented clear speech no focal deficits  Extremities: 3+ edema in bilateral lower extremities, Equal, intact distal " pulses, No cyanosis, clubbing,  No tenderness.   Musculoskeletal: Good range of motion in all major joints. No tenderness to palpation or major deformities noted.     DIAGNOSTIC STUDIES / PROCEDURES    LABS  Results for orders placed or performed during the hospital encounter of 02/04/22   CBC WITH DIFFERENTIAL   Result Value Ref Range    WBC 2.9 (L) 4.8 - 10.8 K/uL    RBC 1.28 (L) 4.20 - 5.40 M/uL    Hemoglobin 3.9 (LL) 12.0 - 16.0 g/dL    Hematocrit 13.1 (LL) 37.0 - 47.0 %    .3 (H) 81.4 - 97.8 fL    MCH 30.5 27.0 - 33.0 pg    MCHC 29.8 (L) 33.6 - 35.0 g/dL    RDW 51.6 (H) 35.9 - 50.0 fL    Platelet Count 136 (L) 164 - 446 K/uL    MPV 9.9 9.0 - 12.9 fL    Neutrophils-Polys 50.90 44.00 - 72.00 %    Lymphocytes 30.10 22.00 - 41.00 %    Monocytes 11.80 0.00 - 13.40 %    Eosinophils 5.20 0.00 - 6.90 %    Basophils 1.00 0.00 - 1.80 %    Immature Granulocytes 1.00 (H) 0.00 - 0.90 %    Nucleated RBC 0.00 /100 WBC    Neutrophils (Absolute) 1.47 (L) 2.00 - 7.15 K/uL    Lymphs (Absolute) 0.87 (L) 1.00 - 4.80 K/uL    Monos (Absolute) 0.34 0.00 - 0.85 K/uL    Eos (Absolute) 0.15 0.00 - 0.51 K/uL    Baso (Absolute) 0.03 0.00 - 0.12 K/uL    Immature Granulocytes (abs) 0.03 0.00 - 0.11 K/uL    NRBC (Absolute) 0.00 K/uL   Comp Metabolic Panel   Result Value Ref Range    Sodium 140 135 - 145 mmol/L    Potassium 6.2 (H) 3.6 - 5.5 mmol/L    Chloride 98 96 - 112 mmol/L    Co2 22 20 - 33 mmol/L    Anion Gap 20.0 (H) 7.0 - 16.0    Glucose 98 65 - 99 mg/dL    Bun 95 (HH) 8 - 22 mg/dL    Creatinine 16.73 (HH) 0.50 - 1.40 mg/dL    Calcium 7.0 (L) 8.4 - 10.2 mg/dL    AST(SGOT) 10 (L) 12 - 45 U/L    ALT(SGPT) 6 2 - 50 U/L    Alkaline Phosphatase 105 (H) 30 - 99 U/L    Total Bilirubin 0.2 0.1 - 1.5 mg/dL    Albumin 3.0 (L) 3.2 - 4.9 g/dL    Total Protein 5.4 (L) 6.0 - 8.2 g/dL    Globulin 2.4 1.9 - 3.5 g/dL    A-G Ratio 1.3 g/dL   BETA-HCG QUALITATIVE SERUM   Result Value Ref Range    Beta-Hcg Qualitative Serum Negative Negative   COD  - Adult (Type and Screen)   Result Value Ref Range    ABO Grouping Only A     Rh Grouping Only POS     Antibody Screen-Cod NEG    TROPONIN   Result Value Ref Range    Troponin T 178 (H) 6 - 19 ng/L   CoV-2, FLU A/B, and RSV by PCR (2-4 Hours CEPHEID) : Collect NP swab in VTM    Specimen: Respirate   Result Value Ref Range    Influenza virus A RNA Negative Negative    Influenza virus B, PCR Negative Negative    RSV, PCR Negative Negative    SARS-CoV-2 by PCR NotDetected     SARS-CoV-2 Source NP Swab    ESTIMATED GFR   Result Value Ref Range    GFR If  3 (A) >60 mL/min/1.73 m 2    GFR If Non African American 2 (A) >60 mL/min/1.73 m 2   PLATELET ESTIMATE   Result Value Ref Range    Plt Estimation Decreased    MORPHOLOGY   Result Value Ref Range    RBC Morphology Present     Polychromia 2+     Basophilic Stippling Moderate    DIFFERENTIAL COMMENT   Result Value Ref Range    Comments-Diff see below    PT/INR   Result Value Ref Range    PT 13.6 12.0 - 14.6 sec    INR 1.13 0.87 - 1.13   PTT   Result Value Ref Range    APTT 63.1 (H) 24.7 - 36.0 sec   EKG (NOW)   Result Value Ref Range    Report       [facility] Emergency Dept.    Test Date:  2022  Pt Name:    TAYLOR WARD               Department: [facility]  MRN:        [id number]                      Room:  Gender:     Female                       Technician:   :        1980                   Requested By:ER TRIAGE PROTOCOL  Order #:    [id number]                    Reading MD: KANIKA HERNANDEZ MD    Measurements  Intervals                                Axis  Rate:       84                           P:          35  MI:         176                          QRS:        38  QRSD:       102                          T:          85  QT:         384  QTc:        454    Interpretive Statements  SINUS RHYTHM  Compared to ECG 2021 02:58:10  No significant changes  Electronically Signed On 2022 19:45:20 PST by KANIKA HERNANDEZ  MD     EKG   Result Value Ref Range    Report       [facility] Emergency Dept.    Test Date:  2022  Pt Name:    TAYLOR WARD               Department: [facility]  MRN:        [id number]                      Room:       -ROOM 9  Gender:     Female                       Technician: FABIO  :        1980                   Requested By:KANIKA HERNANDEZ  Order #:    [id number]                    Reading MD: KANIKA HERNANDEZ MD    Measurements  Intervals                                Axis  Rate:       90                           P:          37  MO:         159                          QRS:        0  QRSD:       98                           T:          95  QT:         360  QTc:        441    Interpretive Statements  Sinus rhythm  Low voltage, precordial leads  Compared to ECG 2022 14:36:20  Low QRS voltage now present  Electronically Signed On 2022 20:33:15 PST by KANIKA HERNANDEZ MD     POCT glucose device results   Result Value Ref Range    Glucose - Accu-Ck 93 65 - 99 mg/dL      All labs reviewed by me.    EKG  12 lead EKG; Interpreted by me as seen above    RADIOLOGY  US-PELVIC COMPLETE (TRANSABDOMINAL/TRANSVAGINAL) (COMBO)   Final Result      1.  Uterus and ovaries are unremarkable.   2.  Moderate amount of peritoneal fluid present, likely related to peritoneal dialysis with peritoneal dialysis catheter visualized.        The radiologist's interpretation of all radiological studies have been reviewed by me.    COURSE & MEDICAL DECISION MAKING  Nursing notes, VS, PMSFHx reviewed in chart.    6:13 PM Patient seen and examined at bedside. Patient will be treated with Fentanyl 50 mg. Ordered US -Pelvic, EKG, COV Flu RSV, Troponin, CBC with diff, CMP, beta HCG qual, COD to evaluate her symptoms. The differential diagnoses include but are not limited to: Lupus flare, viral infection, sepsis, anemia, miscarriage, acute on chronic back pain    7:41 PM - Patient was reevaluated at bedside.  Discussed lab and radiology results with the patient and informed them they have low hemoglobin and discussed the need for blood transfusion. Informed the patient of plan to transfer and hospitalization. Patient verbalizes understanding and agreement to this plan of care. Patient will be treated with D50W, Humulin R, Calcium gluconate 2 g, and sodium bicarbonate 50 mEq. Ordered POCT glucose and potassium serum to further evaluate symptoms.     I have explained to the patient the risks and benefits of transfusion of blood products.  This includes, as appropriate, the risk of mild allergic reaction, hemolytic reaction, transfusion-associated lung injury, febrile reactions, circulatory or iron overload, and infection.    We discussed possible alternatives and their risks, including directed donation, autologous transfusion, and no transfusion, including IV or oral iron supplementation, as appropriate.  I believe the patient understands the risks and benefits and was able to express understanding.    7:52 PM - Patient was reevaluated at bedside. Informed the patient of her elevated potassium and troponin and discussed plan of treatment for hyperkalemia. Paged Dr. Najjar (Nephrology) to prepare peritoneal dialysis for the patient.    8:10 PM - Consulted Dr. Delcid (Intensivist) who agreed to accept the patient for hospitalization. Patient will be admitted to the [facility]. Paged Dr. Chaudhari (Hospitalist).      8:17 PM - Consulted with Dr. Chaudhari (Hospitalist) who agreed to accept the patient for hospitalization in the [facility]      Critical Care  Due to the real possibility of a deterioration of this patient's condition required the highest level of my preparedness for sudden emergent intervention. I provided critical care services which included medication orders, frequent reevaluations of the patient's condition and response to treatment, ordering and reviewing test results and discussing the case with various consultants. The  critical care time associated with the care of the patient was 45 minutes. Review chart for interventions. This time is exclusive of any other billable procedures.     DISPOSITION:  Patient will be transferred to Dr. Chaudhari in critical condition.     FINAL IMPRESSION  1. DUB (dysfunctional uterine bleeding)    2. Iron deficiency anemia due to chronic blood loss    3. Peritoneal dialysis status (HCC)    4. Hyperkalemia          Zachery BARGER ([place]), am scribing for, and in the presence of, Eunice Cevallos M.D..    Electronically signed by: Zachery Cruz ([place]), 2/4/2022    Eunice BARGER M.D. personally performed the services described in this documentation, as scribed by Zachery Cruz in my presence, and it is both accurate and complete.    The note accurately reflects work and decisions made by me.  Eunice Cevallos M.D.  2/4/2022  8:34 PM

## 2022-02-05 NOTE — PROGRESS NOTES
[facility]PITAL[facility] TRIAGE OFFICER DIRECT ADMISSION REPORT  Transferring facility: [facility] ER  Transferring physician: Dr Cevallos  Transferring facility/physician contact number:   Chief complaint: vaginal bleeding  Pertinent history & patient course: 41-year-old female with history of lupus, ESRD on dialysis for 4 years, on PD for the past 2-years presented with to [facility] ED with 2 weeks of vaginal bleeding.  Found to have hemoglobin 3.9, creatinine 16.73, potassium 6.2.  Transfer requested to [facility] [place] and transfer to [facility] as no GYN consult available at [facility].  Nephrology has been notified.  Will need GYN consult arrival.      Pertinent imaging & lab results: Hemoglobin 3.9, potassium 6.2, creatinine 16.73  Code Status: Full per transferring provider, I personally verified with the transferring provider patient's code status and the transferring provider has confirmed this with the patient.  Further work up or recommendations per triage officer prior to transfer: Blood transfusion as needed    Consultants called prior to transfer and pertinent input from consultants: Nephrology notified prior to transfer    Patient accepted for transfer: Yes  Consultants to be called upon arrival: GYN  Admission status: Inpatient.   Floor requested: IMCU  If ICU transfer, name of intensivist case discussed with and pertinent input from critical care:     Please inform the triage officer upon arrival of the patient to [facility] for assignment of a hospitalist to perform admission.     For any question or concerns regarding the care of this patient, please reach out to the assigned hospitalist.

## 2022-02-05 NOTE — ASSESSMENT & PLAN NOTE
Nephrology consulted prior to transfer  Receives peritoneal dialysis daily  Plan for dialysis today

## 2022-02-05 NOTE — ASSESSMENT & PLAN NOTE
Calcium gluconate, insulin, and dextrose fluids given at [facility] prior to transfer  Continue to monitor  Admitted with telemetry  EKG

## 2022-02-05 NOTE — PROGRESS NOTES
'didn't give a name' from Lab called with critical result of phos at 0312. Critical lab result read back to no name.   Dr. Mota notified of critical lab result at 0312.  Critical lab result read back by Dr. Mota.

## 2022-02-05 NOTE — H&P
Asked to consult for AUB with severe anemia    History and Physical      Debby Carrizales is a 41 y.o. female who presents to the ER for VB starting in Jan x 2 weeks then stopped x 1 week then restarted about a week ago. She did not tell her PCP or Nephrologist. She presented to the ER and was had a HB of 3.9 and was sent to [facility] for blood transfusions. Pt has Lupus and end-stage renal disease with a 4 year history of dialysis and is undergoing daily peritoneal dialysis. Also has HTN, fibromyalgia and seizure disorder.    ROS: Pertinent items are noted in HPI.    Past Medical History:   Diagnosis Date   • Anemia    • Arthritis     all joints,r/t lupus   • Avascular necrosis of bones of both hips (HCC) 10/10/2016   • Blood clotting disorder (HCC)     in AV Graft   • Bowel habit changes     constipation   • Closed nondisplaced fracture of base of fifth metacarpal bone of left hand 2/1/2019   • Clostridium difficile colitis 5/3/2011   • Continuous ambulatory peritoneal dialysis status (HCC)    • Dialysis patient    • Environmental and seasonal allergies    • ESBL (extended spectrum beta-lactamase) producing bacteria infection 8/25/2014   • Fall    • Fibromyalgia 11/30/2021    6/10   • High anion gap metabolic acidosis 4/2/2011   • Hypertension    • Lupus (HCC)    • Pneumonia    • Psychiatric disorder     anxiety, depression   • Pyelonephritis 11/21/2017   • Renal failure     Dr. Najjar   • Sepsis due to pneumonia ([facility]) 6/7/2018   • Status epilepticus ([facility])     last seizure      Past Surgical History:   Procedure Laterality Date   • [place] REMOVAL TUNNELED CV CATH Right 12/1/2021    Procedure: REMOVAL, TUNNELED DIALYSIS CATHETER;  Surgeon: Víctor Berg M.D.;  Location: SURGERY [facility];  Service: Vascular   • CATH PLACEMENT Left 12/1/2021    Procedure: INSERTION, CATHETER - MEDIPORT;  Surgeon: Víctor Berg M.D.;  Location: SURGERY [facility];  Service: Vascular   • CATH  PLACEMENT CAPD N/A 5/5/2021    Procedure: INSERTION, CATHETER, CAPD;  Surgeon: Víctor Berg M.D.;  Location: [facility];  Service: Vascular   • THROMBECTOMY Right 12/7/2020    Procedure: Right upper extremity arteriovenous graft thrombectomy;  Surgeon: Daniel Poole M.D.;  Location: [facility];  Service: Vascular   • CATH PLACEMENT Left 12/7/2020    Procedure: port-a-catheter removal;  Surgeon: Daniel Poole M.D.;  Location: [facility];  Service: Vascular   • GAYATHRI N/A 8/20/2019    Procedure: ECHOCARDIOGRAM, TRANSESOPHAGEAL;  Surgeon: Marta Elaine M.D.;  Location: [facility];  Service: Cardiac   • AV FISTULA REVISION Right 8/11/2018    Procedure: AV FISTULA REVISION- Graft and Thrombectomy;  Surgeon: Shabbir Ardon M.D.;  Location: [facility];  Service: General   • AV FISTULA THROMBOLYSIS Right 8/11/2018    Procedure: AV FISTULA THROMBOLYSIS;  Surgeon: Shabbir Ardon M.D.;  Location: [facility];  Service: General   • AV FISTULA CREATION Right 12/9/2017    Procedure: AV FISTULA CREATION-ARM FOR GRAFT;  Surgeon: Lesly Jansen M.D.;  Location: [facility];  Service: General   • THROMBECTOMY  12/9/2017    Procedure: THROMBECTOMY;  Surgeon: Lesly Jansen M.D.;  Location: [facility];  Service: General   • THROMBECTOMY Right 8/21/2016    Procedure: THROMBECTOMY - right AV fistula graft with grams;  Surgeon: Shabbir Ardon M.D.;  Location: [facility];  Service:    • ANGIOPLASTY BALLOON  8/21/2016    Procedure: ANGIOPLASTY BALLOON;  Surgeon: Shabbir Ardon M.D.;  Location: [facility];  Service:    • THROMBECTOMY Right 8/20/2016    Procedure: THROMBECTOMY AV GRAFT;  Surgeon: Shabbir Ardon M.D.;  Location: [facility];  Service:    • AV FISTULA CREATION Right 7/12/2016    Procedure: AV FISTULA CREATION WITH GRAFT BRACHIAL AXILLARY;  Surgeon: Shabbir Ardon  "M.D.;  Location: SURGERY [facility];  Service:    • CLOSED REDUCTION Right 7/5/2016    Procedure: CLOSED REDUCTION- Hip ;  Surgeon: Michael Holman M.D.;  Location: SURGERY [facility];  Service:    • HIP ARTHROPLASTY TOTAL Right 1/18/2016    Procedure: HIP ARTHROPLASTY TOTAL;  Surgeon: Michael Holman M.D.;  Location: SURGERY [facility];  Service:    • AV FISTULA CREATION  2/3/2015    Performed by Shabbir Ardon M.D. at SURGERY [facility]   • AV FISTULA CREATION  11/14/2014    Performed by Shabbir Ardon M.D. at [facility]   • AV FISTULA CREATION  9/9/2014    Performed by Shabbir Ardon M.D. at [facility]   • CATH PLACEMENT  9/9/2014    Performed by Shabbir Ardon M.D. at [facility]   • OTHER  5/2011    tracheostomy   • GASTROSCOPY-ENDO  4/27/2011    Performed by PALMIRA DOAN at ENDOSCOPY [facility]   • COLONOSCOPY WITH BIOPSY  4/20/2011    Performed by ALCIRA CRUZ at ENDOSCOPY [facility]   • GASTROSCOPY-ENDO  4/18/2011    Performed by ALCIRA CRUZ at ENDOSCOPY [facility]   • GASTROSCOPY-ENDO  4/10/2011    Performed by SYED JURADO at ENDOSCOPY [facility]   • SCLEROTHERAPHY  4/10/2011    Performed by SYED JURADO at ENDOSCOPY [facility]   • OTHER ABDOMINAL SURGERY      kidney biopsy   • TRACHEOSTOMY       Family History   Problem Relation Age of Onset   • Heart Disease Mother    • Cancer Father      Social: former TOB use, denies ETOH/Drugs  Allergies: Lorazepam [ativan] and Morphine    Objective:      /77   Pulse 72   Temp 35.9 °C (96.6 °F)   Resp (!) 21   Ht 1.676 m (5' 6\")   Wt 85.1 kg (187 lb 9.8 oz)   SpO2 95%     Physical Exam  Constitutional:       Appearance: She is obese. She is ill-appearing.   HENT:      Head: Normocephalic and atraumatic.      Nose: Nose normal.      Mouth/Throat:      Mouth: Mucous membranes are moist.   Eyes:      Extraocular Movements: " Extraocular movements intact.      Conjunctiva/sclera: Conjunctivae normal.   Cardiovascular:      Rate and Rhythm: Normal rate and regular rhythm.   Pulmonary:      Effort: Pulmonary effort is normal.   Abdominal:      General: Bowel sounds are normal. There is no distension.      Palpations: Abdomen is soft. There is no mass.      Tenderness: There is no abdominal tenderness. There is no guarding or rebound.      Hernia: No hernia is present.   Genitourinary:     General: Normal vulva.   Musculoskeletal:      Cervical back: Normal range of motion and neck supple.      Right lower leg: Edema present.      Left lower leg: Edema present.   Skin:     General: Skin is warm and dry.   Neurological:      General: No focal deficit present.      Mental Status: She is alert and oriented to person, place, and time.   Psychiatric:         Mood and Affect: Mood normal.         Behavior: Behavior normal.         Thought Content: Thought content normal.         Judgment: Judgment normal.       Assessment:   1. AUB  2. Anemia from Blood loss  3. Lupus  4. End stage Renal disease     Plan:   Pt is undergoing a blood transfusion at this time  Has received a dose of Megace  Will start CEE to stop VB  Start SCDs  Watch for S/S of DVT or PE  Plan discussed with hospitalist    Spent 72 mins on discussion, review of records and plan of care

## 2022-02-06 LAB
ALBUMIN SERPL BCP-MCNC: 3 G/DL (ref 3.2–4.9)
ALBUMIN/GLOB SERPL: 1.1 G/DL
ALP SERPL-CCNC: 119 U/L (ref 30–99)
ALT SERPL-CCNC: 5 U/L (ref 2–50)
ANION GAP SERPL CALC-SCNC: 20 MMOL/L (ref 7–16)
AST SERPL-CCNC: 11 U/L (ref 12–45)
BASOPHILS # BLD AUTO: 1.3 % (ref 0–1.8)
BASOPHILS # BLD: 0.04 K/UL (ref 0–0.12)
BILIRUB SERPL-MCNC: 0.3 MG/DL (ref 0.1–1.5)
BUN SERPL-MCNC: 83 MG/DL (ref 8–22)
CA-I SERPL-SCNC: 0.9 MMOL/L (ref 1.1–1.3)
CALCIUM SERPL-MCNC: 8 MG/DL (ref 8.5–10.5)
CHLORIDE SERPL-SCNC: 99 MMOL/L (ref 96–112)
CO2 SERPL-SCNC: 19 MMOL/L (ref 20–33)
CREAT SERPL-MCNC: 15.54 MG/DL (ref 0.5–1.4)
EOSINOPHIL # BLD AUTO: 0.11 K/UL (ref 0–0.51)
EOSINOPHIL NFR BLD: 3.6 % (ref 0–6.9)
ERYTHROCYTE [DISTWIDTH] IN BLOOD BY AUTOMATED COUNT: 52.2 FL (ref 35.9–50)
GLOBULIN SER CALC-MCNC: 2.8 G/DL (ref 1.9–3.5)
GLUCOSE SERPL-MCNC: 96 MG/DL (ref 65–99)
HCT VFR BLD AUTO: 26.6 % (ref 37–47)
HGB BLD-MCNC: 8.8 G/DL (ref 12–16)
IMM GRANULOCYTES # BLD AUTO: 0.04 K/UL (ref 0–0.11)
IMM GRANULOCYTES NFR BLD AUTO: 1.3 % (ref 0–0.9)
LYMPHOCYTES # BLD AUTO: 0.72 K/UL (ref 1–4.8)
LYMPHOCYTES NFR BLD: 23.8 % (ref 22–41)
MAGNESIUM SERPL-MCNC: 2.3 MG/DL (ref 1.5–2.5)
MCH RBC QN AUTO: 30.4 PG (ref 27–33)
MCHC RBC AUTO-ENTMCNC: 33.1 G/DL (ref 33.6–35)
MCV RBC AUTO: 92 FL (ref 81.4–97.8)
MONOCYTES # BLD AUTO: 0.34 K/UL (ref 0–0.85)
MONOCYTES NFR BLD AUTO: 11.2 % (ref 0–13.4)
NEUTROPHILS # BLD AUTO: 1.78 K/UL (ref 2–7.15)
NEUTROPHILS NFR BLD: 58.8 % (ref 44–72)
NRBC # BLD AUTO: 0 K/UL
NRBC BLD-RTO: 0 /100 WBC
PHOSPHATE SERPL-MCNC: 10.9 MG/DL (ref 2.5–4.5)
PLATELET # BLD AUTO: 124 K/UL (ref 164–446)
PMV BLD AUTO: 10.6 FL (ref 9–12.9)
POTASSIUM SERPL-SCNC: 5.6 MMOL/L (ref 3.6–5.5)
PROT SERPL-MCNC: 5.8 G/DL (ref 6–8.2)
RBC # BLD AUTO: 2.89 M/UL (ref 4.2–5.4)
SODIUM SERPL-SCNC: 138 MMOL/L (ref 135–145)
WBC # BLD AUTO: 3 K/UL (ref 4.8–10.8)

## 2022-02-06 PROCEDURE — 99233 SBSQ HOSP IP/OBS HIGH 50: CPT | Performed by: HOSPITALIST

## 2022-02-06 PROCEDURE — 84100 ASSAY OF PHOSPHORUS: CPT

## 2022-02-06 PROCEDURE — 700102 HCHG RX REV CODE 250 W/ 637 OVERRIDE(OP): Performed by: STUDENT IN AN ORGANIZED HEALTH CARE EDUCATION/TRAINING PROGRAM

## 2022-02-06 PROCEDURE — 700111 HCHG RX REV CODE 636 W/ 250 OVERRIDE (IP): Mod: JG | Performed by: OBSTETRICS & GYNECOLOGY

## 2022-02-06 PROCEDURE — 90945 DIALYSIS ONE EVALUATION: CPT | Performed by: INTERNAL MEDICINE

## 2022-02-06 PROCEDURE — 80053 COMPREHEN METABOLIC PANEL: CPT

## 2022-02-06 PROCEDURE — 90945 DIALYSIS ONE EVALUATION: CPT

## 2022-02-06 PROCEDURE — 85025 COMPLETE CBC W/AUTO DIFF WBC: CPT

## 2022-02-06 PROCEDURE — 770001 HCHG ROOM/CARE - MED/SURG/GYN PRIV*

## 2022-02-06 PROCEDURE — 700111 HCHG RX REV CODE 636 W/ 250 OVERRIDE (IP): Performed by: HOSPITALIST

## 2022-02-06 PROCEDURE — 700105 HCHG RX REV CODE 258: Performed by: HOSPITALIST

## 2022-02-06 PROCEDURE — 82330 ASSAY OF CALCIUM: CPT

## 2022-02-06 PROCEDURE — 700101 HCHG RX REV CODE 250: Performed by: HOSPITALIST

## 2022-02-06 PROCEDURE — 700102 HCHG RX REV CODE 250 W/ 637 OVERRIDE(OP): Performed by: HOSPITALIST

## 2022-02-06 PROCEDURE — 83735 ASSAY OF MAGNESIUM: CPT

## 2022-02-06 PROCEDURE — A9270 NON-COVERED ITEM OR SERVICE: HCPCS | Performed by: HOSPITALIST

## 2022-02-06 PROCEDURE — 700111 HCHG RX REV CODE 636 W/ 250 OVERRIDE (IP): Performed by: STUDENT IN AN ORGANIZED HEALTH CARE EDUCATION/TRAINING PROGRAM

## 2022-02-06 PROCEDURE — A9270 NON-COVERED ITEM OR SERVICE: HCPCS | Performed by: STUDENT IN AN ORGANIZED HEALTH CARE EDUCATION/TRAINING PROGRAM

## 2022-02-06 RX ADMIN — AMLODIPINE BESYLATE 10 MG: 10 TABLET ORAL at 05:56

## 2022-02-06 RX ADMIN — LACOSAMIDE 100 MG: 100 TABLET, FILM COATED ORAL at 22:59

## 2022-02-06 RX ADMIN — MEGESTROL ACETATE 800 MG: 40 SUSPENSION ORAL at 05:57

## 2022-02-06 RX ADMIN — OXYCODONE 20 MG: 5 TABLET ORAL at 19:47

## 2022-02-06 RX ADMIN — CONJUGATED ESTROGENS 25 MG: 25 INJECTION, POWDER, LYOPHILIZED, FOR SOLUTION INTRAMUSCULAR; INTRAVENOUS at 05:56

## 2022-02-06 RX ADMIN — ONDANSETRON 4 MG: 2 INJECTION INTRAMUSCULAR; INTRAVENOUS at 12:05

## 2022-02-06 RX ADMIN — CALCIUM CHLORIDE 1000 MG: 100 INJECTION, SOLUTION INTRAVENOUS at 11:25

## 2022-02-06 RX ADMIN — TRAZODONE HYDROCHLORIDE 50 MG: 50 TABLET ORAL at 21:41

## 2022-02-06 RX ADMIN — PREGABALIN 50 MG: 25 CAPSULE ORAL at 05:56

## 2022-02-06 RX ADMIN — ONDANSETRON 4 MG: 2 INJECTION INTRAMUSCULAR; INTRAVENOUS at 21:52

## 2022-02-06 RX ADMIN — OXYCODONE 20 MG: 5 TABLET ORAL at 05:56

## 2022-02-06 RX ADMIN — PREGABALIN 50 MG: 25 CAPSULE ORAL at 16:27

## 2022-02-06 RX ADMIN — OXYCODONE 20 MG: 5 TABLET ORAL at 11:48

## 2022-02-06 RX ADMIN — LACOSAMIDE 100 MG: 100 TABLET, FILM COATED ORAL at 12:05

## 2022-02-06 ASSESSMENT — ENCOUNTER SYMPTOMS
FEVER: 0
PALPITATIONS: 0
HEARTBURN: 0
ABDOMINAL PAIN: 1
RESPIRATORY NEGATIVE: 1
WEAKNESS: 0
COUGH: 0
NAUSEA: 0
EYES NEGATIVE: 1
NECK PAIN: 0
BRUISES/BLEEDS EASILY: 0
CHILLS: 0
VOMITING: 0
BACK PAIN: 1
DIZZINESS: 0
CARDIOVASCULAR NEGATIVE: 1
DEPRESSION: 0
NEUROLOGICAL NEGATIVE: 1
NERVOUS/ANXIOUS: 1
HEADACHES: 0
POLYDIPSIA: 0
FOCAL WEAKNESS: 0

## 2022-02-06 ASSESSMENT — PATIENT HEALTH QUESTIONNAIRE - PHQ9
SUM OF ALL RESPONSES TO PHQ9 QUESTIONS 1 AND 2: 0
1. LITTLE INTEREST OR PLEASURE IN DOING THINGS: NOT AT ALL
2. FEELING DOWN, DEPRESSED, IRRITABLE, OR HOPELESS: NOT AT ALL
SUM OF ALL RESPONSES TO PHQ9 QUESTIONS 1 AND 2: 0
1. LITTLE INTEREST OR PLEASURE IN DOING THINGS: NOT AT ALL
2. FEELING DOWN, DEPRESSED, IRRITABLE, OR HOPELESS: NOT AT ALL

## 2022-02-06 ASSESSMENT — LIFESTYLE VARIABLES
HAVE YOU EVER FELT YOU SHOULD CUT DOWN ON YOUR DRINKING: NO
DOES PATIENT WANT TO STOP DRINKING: NO
EVER HAD A DRINK FIRST THING IN THE MORNING TO STEADY YOUR NERVES TO GET RID OF A HANGOVER: NO
DO YOU DRINK ALCOHOL: NO
HAVE PEOPLE ANNOYED YOU BY CRITICIZING YOUR DRINKING: NO
AVERAGE NUMBER OF DAYS PER WEEK YOU HAVE A DRINK CONTAINING ALCOHOL: 0
EVER FELT BAD OR GUILTY ABOUT YOUR DRINKING: NO
CONSUMPTION TOTAL: NEGATIVE
TOTAL SCORE: 0
ON A TYPICAL DAY WHEN YOU DRINK ALCOHOL HOW MANY DRINKS DO YOU HAVE: 0
HOW MANY TIMES IN THE PAST YEAR HAVE YOU HAD 5 OR MORE DRINKS IN A DAY: 0

## 2022-02-06 ASSESSMENT — PAIN DESCRIPTION - PAIN TYPE: TYPE: ACUTE PAIN;CHRONIC PAIN

## 2022-02-06 NOTE — PROGRESS NOTES
[facility] Services Progress Note     CCPD disconnected aseptically at 0700.  Patient A&o x 4; on oxygen at 2LPM via NC, no complaints of pain; + bilateral lower leg edema     Exit site dressing cleansed, dressing changed CDI; no s/s infection noted.     Total UF : 986 ML (Total UF 1145 mL + Initial Drain 2341 mL -Last fill 2500mL)     Report given to Primary RN

## 2022-02-06 NOTE — PROGRESS NOTES
"Debby Carrizales HD#2    Subjective: Pt admitted for AUB resulting in severe anemia with ESRD and Lupus. Pt states she does not know if she is still having VB but has not changed her pads for several hours now and is feeling better after blood transfusion.    /79   Pulse 75   Temp 36.6 °C (97.8 °F) (Temporal)   Resp 13   Ht 1.676 m (5' 6\")   Wt 85.1 kg (187 lb 9.8 oz)   SpO2 96%   Alert female in NAD  Abdomen soft, non-tender. BS normal. No masses,  No organomegaly  GYN no evidence of active bleeding currently. Pad with scant blood  Extremities NT    Meds:   No current facility-administered medications on file prior to encounter.     Current Outpatient Medications on File Prior to Encounter   Medication Sig Dispense Refill   • [START ON 2/8/2022] Oxycodone HCl 20 MG Tab Take 1 Tablet by mouth 4 times a day as needed (pain) for up to 28 days. 98 Tablet 0   • Oxycodone HCl 20 MG Tab Take 1 Tablet by mouth 4 times a day as needed (pain) for up to 30 days. 105 Tablet 0   • [START ON 2/12/2022] pregabalin (LYRICA) 50 MG capsule Take 1 Capsule by mouth 2 times a day for 30 days. 60 Capsule 1   • promethazine (PHENERGAN) 25 MG Tab Take 1 Tablet by mouth every 8 hours as needed for Nausea/Vomiting. 60 Tablet 11   • lactulose 10 GM/15ML Solution Take 20 g by mouth 1 time a day as needed (Constipation). 30 mL = 20 mg.     • tizanidine (ZANAFLEX) 4 MG Tab TAKE 2 TABLETS BY MOUTH AT BEDTIME AS NEEDED FOR MUSCLE SPASMS (Patient taking differently: Take 8 mg by mouth at bedtime as needed.) 180 tablet 3   • amLODIPine (NORVASC) 10 MG Tab Take 1 tablet by mouth every day. 90 tablet 3   • lacosamide (VIMPAT) 100 MG Tab tablet Take 100 mg by mouth 2 times a day. Indications: seizures         Lab:   Recent Results (from the past 48 hour(s))   EKG (NOW)    Collection Time: 02/04/22  2:36 PM   Result Value Ref Range    Report       [facility] Emergency Dept.    Test Date:  2022-02-04  Pt " Name:    TAYLOR WARD               Department: [facility]  MRN:        [id number]                      Room:  Gender:     Female                       Technician:   :        1980                   Requested By:ER TRIAGE PROTOCOL  Order #:    [id number]                    Reading MD: KANIKA HERNANDEZ MD    Measurements  Intervals                                Axis  Rate:       84                           P:          35  WY:         176                          QRS:        38  QRSD:       102                          T:          85  QT:         384  QTc:        454    Interpretive Statements  SINUS RHYTHM  Compared to ECG 2021 02:58:10  No significant changes  Electronically Signed On 2022 19:45:20 PST by KANIKA HERNANDEZ MD     CBC WITH DIFFERENTIAL    Collection Time: 22  7:01 PM   Result Value Ref Range    WBC 2.9 (L) 4.8 - 10.8 K/uL    RBC 1.28 (L) 4.20 - 5.40 M/uL    Hemoglobin 3.9 (LL) 12.0 - 16.0 g/dL    Hematocrit 13.1 (LL) 37.0 - 47.0 %    .3 (H) 81.4 - 97.8 fL    MCH 30.5 27.0 - 33.0 pg    MCHC 29.8 (L) 33.6 - 35.0 g/dL    RDW 51.6 (H) 35.9 - 50.0 fL    Platelet Count 136 (L) 164 - 446 K/uL    MPV 9.9 9.0 - 12.9 fL    Neutrophils-Polys 50.90 44.00 - 72.00 %    Lymphocytes 30.10 22.00 - 41.00 %    Monocytes 11.80 0.00 - 13.40 %    Eosinophils 5.20 0.00 - 6.90 %    Basophils 1.00 0.00 - 1.80 %    Immature Granulocytes 1.00 (H) 0.00 - 0.90 %    Nucleated RBC 0.00 /100 WBC    Neutrophils (Absolute) 1.47 (L) 2.00 - 7.15 K/uL    Lymphs (Absolute) 0.87 (L) 1.00 - 4.80 K/uL    Monos (Absolute) 0.34 0.00 - 0.85 K/uL    Eos (Absolute) 0.15 0.00 - 0.51 K/uL    Baso (Absolute) 0.03 0.00 - 0.12 K/uL    Immature Granulocytes (abs) 0.03 0.00 - 0.11 K/uL    NRBC (Absolute) 0.00 K/uL   Comp Metabolic Panel    Collection Time: 22  7:01 PM   Result Value Ref Range    Sodium 140 135 - 145 mmol/L    Potassium 6.2 (H) 3.6 - 5.5 mmol/L    Chloride 98 96 - 112 mmol/L    Co2 22 20 - 33 mmol/L    Anion  Gap 20.0 (H) 7.0 - 16.0    Glucose 98 65 - 99 mg/dL    Bun 95 (HH) 8 - 22 mg/dL    Creatinine 16.73 (HH) 0.50 - 1.40 mg/dL    Calcium 7.0 (L) 8.4 - 10.2 mg/dL    AST(SGOT) 10 (L) 12 - 45 U/L    ALT(SGPT) 6 2 - 50 U/L    Alkaline Phosphatase 105 (H) 30 - 99 U/L    Total Bilirubin 0.2 0.1 - 1.5 mg/dL    Albumin 3.0 (L) 3.2 - 4.9 g/dL    Total Protein 5.4 (L) 6.0 - 8.2 g/dL    Globulin 2.4 1.9 - 3.5 g/dL    A-G Ratio 1.3 g/dL   BETA-HCG QUALITATIVE SERUM    Collection Time: 22  7:01 PM   Result Value Ref Range    Beta-Hcg Qualitative Serum Negative Negative   COD - Adult (Type and Screen)    Collection Time: 22  7:01 PM   Result Value Ref Range    ABO Grouping Only A     Rh Grouping Only POS     Antibody Screen-Cod NEG     Component R       R99                 Red Cells, LR       [id number]   released     22   00:50      Product Type R99     Dispense Status /Released     Unit Number (Barcoded) [id number]     Product Code (Barcoded) T0209X84     Blood Type (Barcoded) 6200     Component R       R99                 Red Cells, LR       [id number]   released     22   00:50      Product Type R99     Dispense Status /Released     Unit Number (Barcoded) [id number]     Product Code (Barcoded) X6478R20     Blood Type (Barcoded) 6200     Component R       R99                 Red Cells, LR       [id number]   released     22   00:50      Product Type R99     Dispense Status /Released     Unit Number (Barcoded) [id number]     Product Code (Barcoded) N0850L85     Blood Type (Barcoded) 6200    TROPONIN    Collection Time: 22  7:01 PM   Result Value Ref Range    Troponin T 178 (H) 6 - 19 ng/L   ESTIMATED GFR    Collection Time: 22  7:01 PM   Result Value Ref Range    GFR If  3 (A) >60 mL/min/1.73 m 2    GFR If Non African American 2 (A) >60 mL/min/1.73 m 2   PLATELET ESTIMATE    Collection Time: 22  7:01 PM   Result Value Ref Range     Plt Estimation Decreased    MORPHOLOGY    Collection Time: 22  7:01 PM   Result Value Ref Range    RBC Morphology Present     Polychromia 2+     Basophilic Stippling Moderate    DIFFERENTIAL COMMENT    Collection Time: 22  7:01 PM   Result Value Ref Range    Comments-Diff see below    PT/INR    Collection Time: 22  7:01 PM   Result Value Ref Range    PT 13.6 12.0 - 14.6 sec    INR 1.13 0.87 - 1.13   PTT    Collection Time: 22  7:01 PM   Result Value Ref Range    APTT 63.1 (H) 24.7 - 36.0 sec   CoV-2, FLU A/B, and RSV by PCR (2-4 Hours CEPHEID) : Collect NP swab in [facility]    Collection Time: 22  7:17 PM    Specimen: Respirate   Result Value Ref Range    Influenza virus A RNA Negative Negative    Influenza virus B, PCR Negative Negative    RSV, PCR Negative Negative    SARS-CoV-2 by PCR NotDetected     SARS-CoV-2 Source NP Swab    POCT glucose device results    Collection Time: 22  7:53 PM   Result Value Ref Range    Glucose - Accu-Ck 93 65 - 99 mg/dL   EKG    Collection Time: 22  8:16 PM   Result Value Ref Range    Report       [facility] Emergency Dept.    Test Date:  2022  Pt Name:    TAYLOR WARD               Department[facility]  MRN:        [id number]                      [facility]  Gender:     Female                       Technician: FABIO  :        1980                   Requested By:KANIKA HERNANDEZ  Order #:    [id number]                    Reading MD: KANIKA HERNANDEZ MD    Measurements  Intervals                                Axis  Rate:       90                           P:          37  SD:         159                          QRS:        0  QRSD:       98                           T:          95  QT:         360  QTc:        441    Interpretive Statements  Sinus rhythm  Low voltage, precordial leads  Compared to ECG 2022 14:36:20  Low QRS voltage now present  Electronically Signed On 2022 20:33:15 PST by KANIKA HERNANDEZ  MD     POCT glucose device results    Collection Time: 02/04/22  8:42 PM   Result Value Ref Range    Glucose - Accu-Ck 106 (H) 65 - 99 mg/dL   COD (Adult) - Type and Crossmatch only order if transfusing RBC'S    Collection Time: 02/04/22 11:30 PM   Result Value Ref Range    ABO Grouping Only A     Rh Grouping Only POS     Antibody Screen-Cod NEG     Component R       R99                 Red Cells, LR       [id number]   transfused   02/05/22   01:01      Product Type R99     Dispense Status transfused     Unit Number (Barcoded) [id number]     Product Code (Barcoded) S8357B67     Blood Type (Barcoded) 6200     Component R       R99                 Red Cells, LR       [id number]   transfused   02/05/22   08:43      Product Type R99     Dispense Status transfused     Unit Number (Barcoded) [id number]     Product Code (Barcoded) L1172V71     Blood Type (Barcoded) 6200     Component R       R99                 Red Cells, LR       [id number]   transfused   02/05/22   03:05      Product Type R99     Dispense Status transfused     Unit Number (Barcoded) [id number]     Product Code (Barcoded) A9683J74     Blood Type (Barcoded) 6200     Component R       R99                 Red Cells, LR       [id number]   transfused   02/05/22   12:03      Product Type R99     Dispense Status transfused     Unit Number (Barcoded) [id number]     Product Code (Barcoded) Z6497Z90     Blood Type (Barcoded) 6200     Component R       R99                 Red Cells, LR       [id number]   selected     02/05/22   10:06      Product Type R99     Dispense Status selected     Unit Number (Barcoded) [id number]     Product Code (Barcoded) Y6792O87     Blood Type (Barcoded) 6200    Basic TEG    Collection Time: 02/04/22 11:30 PM   Result Value Ref Range    Reaction Time Initial-R 3.6 (L) 4.6 - 9.1 min    React Time Initial Hep 3.7 (L) 4.3 - 8.3 min    Clot Kinetics-K 0.8 0.8 - 2.1 min    Clot Angle-Angle 77.3 63.0 - 78.0  degrees    Maximum Clot Strength-MA 68.0 52.0 - 69.0 mm    TEG Functional Fibrinogen(MA) 29.9 15.0 - 32.0 mm    Lysis 30 minutes-LY30 0.0 0.0 - 2.6 %    TEG Algorithm Link Algorithm    Platelet Count    Collection Time: 22 11:30 PM   Result Value Ref Range    Platelet Count 129 (L) 164 - 446 K/uL   EKG    Collection Time: 22 12:05 AM   Result Value Ref Range    Report       [facility]    Test Date:  2022  Pt Name:    TAYLOR WARD               Department: [facility]  MRN:        [id number]                      [place]       [facility]  Gender:     Female                       Technician: MAXIMO  :        1980                   Requested By:JUAN GALE  Order #:    [id number]                    Reading MD: Orlin Sam MD    Measurements  Intervals                                Axis  Rate:       77                           P:          39  AK:         172                          QRS:        13  QRSD:       84                           T:          77  QT:         396  QTc:        449    Interpretive Statements  SINUS RHYTHM  NONSPECIFIC ST-T WAVE ABNORMALITIES, ANTERIOR LATERAL LEADS  Electronically Signed On 2022 18:31:09 PST by Orlin Sam MD     Sed Rate    Collection Time: 22  2:03 AM   Result Value Ref Range    Sed Rate Westergren >140 (H) 0 - 25 mm/hour   CRP Quantitive (Non-Cardiac)    Collection Time: 22  2:03 AM   Result Value Ref Range    Stat C-Reactive Protein <0.30 0.00 - 0.75 mg/dL   Ionized calcium - free    Collection Time: 22  2:03 AM   Result Value Ref Range    Ionized Calcium 0.9 (L) 1.1 - 1.3 mmol/L   Phosphorus    Collection Time: 22  2:03 AM   Result Value Ref Range    Phosphorus 12.0 (HH) 2.5 - 4.5 mg/dL   Uric Acid    Collection Time: 22  2:03 AM   Result Value Ref Range    Uric Acid 7.6 1.9 - 8.2 mg/dL   TROPONIN    Collection Time: 22  2:03 AM   Result Value Ref Range    Troponin T 175 (H) 6 - 19 ng/L   POTASSIUM  SERUM (K)    Collection Time: 02/05/22  2:03 AM   Result Value Ref Range    Potassium 5.9 (H) 3.6 - 5.5 mmol/L   CBC WITH DIFFERENTIAL    Collection Time: 02/05/22  7:40 AM   Result Value Ref Range    WBC 2.5 (LL) 4.8 - 10.8 K/uL    RBC 1.81 (L) 4.20 - 5.40 M/uL    Hemoglobin 5.7 (LL) 12.0 - 16.0 g/dL    Hematocrit 17.7 (LL) 37.0 - 47.0 %    MCV 97.8 81.4 - 97.8 fL    MCH 31.5 27.0 - 33.0 pg    MCHC 32.2 (L) 33.6 - 35.0 g/dL    RDW 50.4 (H) 35.9 - 50.0 fL    Platelet Count 111 (L) 164 - 446 K/uL    MPV 10.5 9.0 - 12.9 fL    Neutrophils-Polys 46.00 44.00 - 72.00 %    Lymphocytes 31.60 22.00 - 41.00 %    Monocytes 14.80 (H) 0.00 - 13.40 %    Eosinophils 4.80 0.00 - 6.90 %    Basophils 1.60 0.00 - 1.80 %    Immature Granulocytes 1.20 (H) 0.00 - 0.90 %    Nucleated RBC 0.00 /100 WBC    Neutrophils (Absolute) 1.15 (L) 2.00 - 7.15 K/uL    Lymphs (Absolute) 0.79 (L) 1.00 - 4.80 K/uL    Monos (Absolute) 0.37 0.00 - 0.85 K/uL    Eos (Absolute) 0.12 0.00 - 0.51 K/uL    Baso (Absolute) 0.04 0.00 - 0.12 K/uL    Immature Granulocytes (abs) 0.03 0.00 - 0.11 K/uL    NRBC (Absolute) 0.00 K/uL   HEPATITIS PANEL ACUTE(4 COMPONENTS)    Collection Time: 02/05/22  9:00 AM   Result Value Ref Range    Hepatitis B Surface Antigen Non-Reactive Non-Reactive    Hepatitis B Cors Ab,IgM Non-Reactive Non-Reactive    Hepatitis A Virus Ab, IgM Non-Reactive Non-Reactive    Hepatitis C Antibody Non-Reactive Non-Reactive   HEP B SURFACE AB    Collection Time: 02/05/22  9:00 AM   Result Value Ref Range    Hep B Surface Antibody Quant 21.10 (H) 0.00 - 10.00 mIU/mL   HEMOGLOBIN AND HEMATOCRIT    Collection Time: 02/05/22  5:14 PM   Result Value Ref Range    Hemoglobin 9.0 (L) 12.0 - 16.0 g/dL    Hematocrit 26.4 (L) 37.0 - 47.0 %   IONIZED CALCIUM    Collection Time: 02/06/22  1:38 AM   Result Value Ref Range    Ionized Calcium 0.9 (L) 1.1 - 1.3 mmol/L       Assessment and Plan  1. AUB  2. Anemia  3. End stage renal failure  4. Lupus    Pt has rec'd  blood transfusion and Hb is now 9  She is also not having any further significant VB since IV CEE and currently is not having any VB  Continue CEE x 4 doses  Would give patient Depo Provera on discharge and have PCP give every 12 weeks to prevent menses

## 2022-02-06 NOTE — PROCEDURES
Diagnosis: End-Stage Renal Disease on PD admitted with severe anemia from endometrial bleeding. Patient seen and examined on CCPD during treatment. Patient is stable, tolerating CCPD. Denies chest pain and shortness of breath, and abdominal pain. Orders updated as needed. Please refer to flowsheet for full details.    Access: PD catheter  UF goal: As tolerated by 2.5% solution    Plan: Continue nightly CCPD, 10 hours, 2.5 L x 3 exchanges with 2.5 L last fill.  Continue 2.5% solution given patient still somewhat edematous.    Bora Douglas MD  Nephrology

## 2022-02-06 NOTE — CARE PLAN
The patient is Watcher - Medium risk of patient condition declining or worsening    Shift Goals  Clinical Goals: stable H&H  Patient Goals: rest  Family Goals: n/a    Progress made toward(s) clinical / shift goals:      Problem: Knowledge Deficit - Standard  Goal: Patient and family/care givers will demonstrate understanding of plan of care, disease process/condition, diagnostic tests and medications  Outcome: Progressing  Note: Discussed POC and medications with patient, pt. verbalized understanding.

## 2022-02-06 NOTE — PROGRESS NOTES
[facility] Medicine Daily Progress Note    Date of Service  2/6/2022    Chief Complaint  Debby Carrizales is a 41 y.o. female admitted 2/4/2022 with vaginal bleeding and severe anemia    Hospital Course  Debby Carrizales is a 41 y.o. female with past medical history of lupus, end-stage renal disease with 4-year history of dialysis and undergoing peritoneal dialysis daily , hypertension, seizure disorder who presented 2/4/2022 as a transfer from [facility] ED.  Patient presented to emergency department at [facility] due to 2-week history of increased vaginal bleeding, patient was found to have hemoglobin 3.9, creatinine 16.73, potassium 6.2 upon arrival to the ED and was transferred to [facility] as no Gyn consult was available at [facility].  Nephrology was consulted prior to transfer.  The patient received blood transfusions, she denies prior clotting disorders.  Gynecology has been consulted.    Interval Problem Update  Patient seen and examined today.    Patient tolerating treatment and therapies.  All Data, Medication data reviewed.  Case discussed with nursing as available.  Plan of Care reviewed with patient and notified of changes.  2/5 the patient complains of back pain, still has vaginal bleeding but not excessive, there is clots, case discussed with gynecology, additional blood products ordered since the patient had a hemoglobin of 5.7 still.  Reviewed TEG, patient now scheduled for additional peritoneal dialysis.  The patient has a history of fibromyalgia and seizure in addition to the above-mentioned diagnosis.  No recent exacerbation per the patient.  2/6 the patient feels overall better, still some back pain, no further vaginal bleeding  Tolerating PD, discussed with nephrology  I have personally seen and examined the patient at bedside. I discussed the plan of care with patient.    Consultants/Specialty  nephrology, gynecology, critical care    Code Status  Full  Code    Disposition  Patient is not medically cleared for discharge.   Anticipate discharge to to home with close outpatient follow-up.  I have placed the appropriate orders for post-discharge needs.    Review of Systems  Review of Systems   Constitutional: Positive for malaise/fatigue. Negative for chills and fever.   HENT: Negative.    Eyes: Negative.    Respiratory: Negative.  Negative for cough.    Cardiovascular: Negative.  Negative for chest pain and palpitations.   Gastrointestinal: Positive for abdominal pain. Negative for heartburn, nausea and vomiting.   Genitourinary: Negative.  Negative for dysuria and frequency.        Vaginal bleeding   Musculoskeletal: Positive for back pain. Negative for neck pain.   Skin: Negative.  Negative for itching and rash.   Neurological: Negative.  Negative for dizziness, focal weakness, weakness and headaches.   Endo/Heme/Allergies: Negative.  Negative for polydipsia. Does not bruise/bleed easily.   Psychiatric/Behavioral: Negative for depression. The patient is nervous/anxious.         Physical Exam  Temp:  [35.9 °C (96.6 °F)-36.8 °C (98.2 °F)] 35.9 °C (96.7 °F)  Pulse:  [63-85] 85  Resp:  [10-23] 13  BP: (116-165)/(51-95) 156/76  SpO2:  [88 %-100 %] 88 %    Physical Exam  Vitals and nursing note reviewed.   Constitutional:       Appearance: She is well-developed. She is obese. She is ill-appearing. She is not diaphoretic.      Interventions: Nasal cannula in place.   HENT:      Head: Normocephalic and atraumatic.      Nose: Nose normal.   Eyes:      Conjunctiva/sclera: Conjunctivae normal.      Pupils: Pupils are equal, round, and reactive to light.   Neck:      Thyroid: No thyromegaly.      Vascular: No JVD.   Cardiovascular:      Rate and Rhythm: Normal rate and regular rhythm.      Heart sounds: Normal heart sounds. No friction rub. No gallop.    Pulmonary:      Effort: Pulmonary effort is normal.      Breath sounds: Normal breath sounds. No wheezing or rales.    Abdominal:      General: Bowel sounds are normal. There is no distension.      Palpations: Abdomen is soft. There is no mass.      Tenderness: There is abdominal tenderness. There is no guarding or rebound.   Musculoskeletal:         General: Tenderness present. Normal range of motion.      Cervical back: Normal range of motion and neck supple.   Lymphadenopathy:      Cervical: No cervical adenopathy.   Skin:     General: Skin is warm and dry.      Coloration: Skin is pale.   Neurological:      Mental Status: She is oriented to person, place, and time. She is lethargic.      Cranial Nerves: No cranial nerve deficit.      Comments: Muscle jerking  Tremors   Psychiatric:         Behavior: Behavior normal.         Fluids    Intake/Output Summary (Last 24 hours) at 2/6/2022 0752  Last data filed at 2/6/2022 0700  Gross per 24 hour   Intake 1238.75 ml   Output 986 ml   Net 252.75 ml       Laboratory  Recent Labs     02/04/22  1901 02/04/22  1901 02/04/22  2330 02/05/22  0740 02/05/22  1714 02/06/22  0319   WBC 2.9*  --   --  2.5*  --  3.0*   RBC 1.28*  --   --  1.81*  --  2.89*   HEMOGLOBIN 3.9*   < >  --  5.7* 9.0* 8.8*   HEMATOCRIT 13.1*   < >  --  17.7* 26.4* 26.6*   .3*  --   --  97.8  --  92.0   MCH 30.5  --   --  31.5  --  30.4   MCHC 29.8*  --   --  32.2*  --  33.1*   RDW 51.6*  --   --  50.4*  --  52.2*   PLATELETCT 136*   < > 129* 111*  --  124*   MPV 9.9  --   --  10.5  --  10.6    < > = values in this interval not displayed.     Recent Labs     02/04/22  1901 02/05/22  0203 02/06/22  0138   SODIUM 140  --  138   POTASSIUM 6.2* 5.9* 5.6*   CHLORIDE 98  --  99   CO2 22  --  19*   GLUCOSE 98  --  96   BUN 95*  --  83*   CREATININE 16.73*  --  15.54*   CALCIUM 7.0*  --  8.0*     Recent Labs     02/04/22  1901   APTT 63.1*   INR 1.13               Imaging  No orders to display        Assessment/Plan  * Hyperkalemia- (present on admission)  Assessment & Plan  Calcium gluconate, insulin, and dextrose fluids  given at [facility] prior to transfer  Continue to monitor  Admitted with telemetry  EKG    Dysfunctional uterine bleeding- (present on admission)  Assessment & Plan  Megace 800 mg daily ordered, estrogen as per consult with Gyn, Dr. Hernandez  Patient with past medical history of irregular menstruation and states that this is the first bleeding she has experienced in the past 3 years.   Close follow-up on H&H  The patient will need Depo-Provera on discharge  Appreciate gynecology input    Port-A-Cath in place- (present on admission)  Assessment & Plan  Appears functional    Seizure disorder (HCC)- (present on admission)  Assessment & Plan  Continue home meds  Seizure precautions in place    Elevated troponin- (present on admission)  Assessment & Plan  Troponin 178 in [facility] ED  Repeat troponin ordered  Denies chest pain  Likely 2/2 demand ischemia  EKG showing low voltage NSR  Admitted with telemetry    Pancytopenia ([facility])  Assessment & Plan  COD and transfusion ordered   Continue to monitor and transfuse as indicated  Attempt to maintain hemoglobin above seven    ESRD (end stage renal disease) on dialysis ([facility])- (present on admission)  Assessment & Plan  Nephrology consulted prior to transfer  Receives peritoneal dialysis daily  Plan for dialysis today    Lupus (systemic lupus erythematosus) ([facility])- (present on admission)  Assessment & Plan  History of,    Blood loss anemia  Assessment & Plan  Severe on top of chronic anemia with anemia of chronic disease  Transfuse for hemoglobin below seven     Plan  Patient so far improved, no further significant vaginal bleeding noted  Peritoneal dialysis, ongoing  Agree with estrogen treatment as per recommendations from gynecology  Patient currently not a candidate for anticoagulation or DVT prophylaxis  Close monitoring on hemodynamic and hematologic status  See orders  Medically complex high risk patient      VTE prophylaxis: pharmacologic prophylaxis contraindicated due to  Bleeding    I have performed a physical exam and reviewed and updated ROS and Plan today (2/6/2022). In review of yesterday's note (2/5/2022), there are no changes except as documented above.      Please note that this dictation was created using voice recognition software. I have made every reasonable attempt to correct obvious errors, but I expect that there are errors of grammar and possibly context that I did not discover before finalizing the note.

## 2022-02-06 NOTE — CARE PLAN
The patient is continue goals    Shift Goals  Clinical Goals: free from falls  Patient Goals: rest, comfort  Family Goals: n/a    Progress made toward(s) clinical / shift goals:  progressing    Patient is not progressing towards the following goals:

## 2022-02-07 VITALS
HEIGHT: 66 IN | BODY MASS INDEX: 29.05 KG/M2 | OXYGEN SATURATION: 98 % | RESPIRATION RATE: 14 BRPM | DIASTOLIC BLOOD PRESSURE: 77 MMHG | HEART RATE: 78 BPM | TEMPERATURE: 98 F | SYSTOLIC BLOOD PRESSURE: 99 MMHG | WEIGHT: 180.78 LBS

## 2022-02-07 LAB
ANION GAP SERPL CALC-SCNC: 17 MMOL/L (ref 7–16)
BUN SERPL-MCNC: 78 MG/DL (ref 8–22)
CALCIUM SERPL-MCNC: 7.2 MG/DL (ref 8.5–10.5)
CHLORIDE SERPL-SCNC: 100 MMOL/L (ref 96–112)
CO2 SERPL-SCNC: 22 MMOL/L (ref 20–33)
CREAT SERPL-MCNC: 14.96 MG/DL (ref 0.5–1.4)
ERYTHROCYTE [DISTWIDTH] IN BLOOD BY AUTOMATED COUNT: 52.5 FL (ref 35.9–50)
GLUCOSE SERPL-MCNC: 103 MG/DL (ref 65–99)
HCT VFR BLD AUTO: 27.5 % (ref 37–47)
HGB BLD-MCNC: 8.9 G/DL (ref 12–16)
MCH RBC QN AUTO: 30.7 PG (ref 27–33)
MCHC RBC AUTO-ENTMCNC: 32.4 G/DL (ref 33.6–35)
MCV RBC AUTO: 94.8 FL (ref 81.4–97.8)
PLATELET # BLD AUTO: 130 K/UL (ref 164–446)
PMV BLD AUTO: 10.6 FL (ref 9–12.9)
POTASSIUM SERPL-SCNC: 5.7 MMOL/L (ref 3.6–5.5)
RBC # BLD AUTO: 2.9 M/UL (ref 4.2–5.4)
SODIUM SERPL-SCNC: 139 MMOL/L (ref 135–145)
WBC # BLD AUTO: 4.2 K/UL (ref 4.8–10.8)

## 2022-02-07 PROCEDURE — A9270 NON-COVERED ITEM OR SERVICE: HCPCS | Performed by: STUDENT IN AN ORGANIZED HEALTH CARE EDUCATION/TRAINING PROGRAM

## 2022-02-07 PROCEDURE — 85027 COMPLETE CBC AUTOMATED: CPT

## 2022-02-07 PROCEDURE — 80048 BASIC METABOLIC PNL TOTAL CA: CPT

## 2022-02-07 PROCEDURE — 700111 HCHG RX REV CODE 636 W/ 250 OVERRIDE (IP): Performed by: HOSPITALIST

## 2022-02-07 PROCEDURE — 700101 HCHG RX REV CODE 250: Performed by: HOSPITALIST

## 2022-02-07 PROCEDURE — 99239 HOSP IP/OBS DSCHRG MGMT >30: CPT | Performed by: HOSPITALIST

## 2022-02-07 PROCEDURE — 90945 DIALYSIS ONE EVALUATION: CPT

## 2022-02-07 PROCEDURE — 700102 HCHG RX REV CODE 250 W/ 637 OVERRIDE(OP): Performed by: HOSPITALIST

## 2022-02-07 PROCEDURE — 700102 HCHG RX REV CODE 250 W/ 637 OVERRIDE(OP): Performed by: STUDENT IN AN ORGANIZED HEALTH CARE EDUCATION/TRAINING PROGRAM

## 2022-02-07 PROCEDURE — A9270 NON-COVERED ITEM OR SERVICE: HCPCS | Performed by: HOSPITALIST

## 2022-02-07 RX ORDER — SEVELAMER CARBONATE 800 MG/1
TABLET, FILM COATED ORAL
Qty: 420 TABLET | Refills: 11
Start: 2022-02-07 | End: 2022-03-08

## 2022-02-07 RX ORDER — HEPARIN SODIUM (PORCINE) LOCK FLUSH IV SOLN 100 UNIT/ML 100 UNIT/ML
300-500 SOLUTION INTRAVENOUS PRN
Status: DISCONTINUED | OUTPATIENT
Start: 2022-02-07 | End: 2022-02-07

## 2022-02-07 RX ORDER — MEDROXYPROGESTERONE ACETATE 150 MG/ML
150 INJECTION, SUSPENSION INTRAMUSCULAR ONCE
Status: COMPLETED | OUTPATIENT
Start: 2022-02-07 | End: 2022-02-07

## 2022-02-07 RX ADMIN — MEDROXYPROGESTERONE ACETATE 150 MG: 150 INJECTION, SUSPENSION, EXTENDED RELEASE INTRAMUSCULAR at 10:54

## 2022-02-07 RX ADMIN — OXYCODONE 20 MG: 5 TABLET ORAL at 05:38

## 2022-02-07 RX ADMIN — PREGABALIN 50 MG: 25 CAPSULE ORAL at 05:32

## 2022-02-07 RX ADMIN — LACOSAMIDE 100 MG: 100 TABLET, FILM COATED ORAL at 10:11

## 2022-02-07 RX ADMIN — AMLODIPINE BESYLATE 10 MG: 10 TABLET ORAL at 05:32

## 2022-02-07 RX ADMIN — OXYCODONE 20 MG: 5 TABLET ORAL at 10:54

## 2022-02-07 RX ADMIN — MEGESTROL ACETATE 800 MG: 40 SUSPENSION ORAL at 05:32

## 2022-02-07 RX ADMIN — Medication 500 UNITS: at 10:55

## 2022-02-07 NOTE — DISCHARGE SUMMARY
Discharge Summary    CHIEF COMPLAINT ON ADMISSION  No chief complaint on file.      Reason for Admission  Vaginal bleeding, Gyn consult     Admission Date  2/4/2022    CODE STATUS  Full Code    HPI & HOSPITAL COURSE  Debby Carrizales is a 41 y.o. female with past medical history of lupus, end-stage renal disease with 4-year history of dialysis and undergoing peritoneal dialysis daily , hypertension, seizure disorder who presented 2/4/2022 as a transfer from [facility] ED.  Patient presented to emergency department at [facility] due to 2-week history of increased vaginal bleeding, patient was found to have hemoglobin 3.9, creatinine 16.73, potassium 6.2 upon arrival to the ED and was transferred to [facility] as no Gyn consult was available at [facility].  Nephrology was consulted prior to transfer.  The patient received blood transfusions, she denies prior clotting disorders.  Gynecology has been consulted.  The patient received IV estrogen as well as Megace and attempt to stop her endometrial bleeding, she required some additional blood transfusion and then stabilized and had no further bleeding.  The patient underwent several peritoneal dialysis sessions with good tolerance.  The patient was given a dose of Depo-Provera prior to discharge and was comfortable with that discussing further management with her primary care physician including ongoing Depo-Provera administration/prescription.  The patient was ambulating and felt to be safe for discharge.  The patient will continue peritoneal dialysis as previously performed.  She will have regular follow-up with her nephrologist as previously provided.    Therefore, she is discharged in fair and stable condition to home with close outpatient follow-up.    The patient met 2-midnight criteria for an inpatient stay at the time of discharge.    Discharge Date  2/7/2022    FOLLOW UP ITEMS POST DISCHARGE  Primary care physician for ongoing Depo-Provera  prescription and/or referral to gynecology    DISCHARGE DIAGNOSES  Principal Problem:    Hyperkalemia POA: Yes  Active Problems:    Blood loss anemia POA: Unknown    Lupus (systemic lupus erythematosus) (HCC) POA: Yes    ESRD (end stage renal disease) on dialysis (HCC) POA: Yes    Pancytopenia (HCC) POA: Unknown    Elevated troponin POA: Yes      Overview: Minimally elevated      No cp or abnormalities on ekg      Likely due to ESRD, will check another trop, no indication for tele       monitoring at this time    Seizure disorder (HCC) POA: Yes    Port-A-Cath in place POA: Yes    Dysfunctional uterine bleeding POA: Yes  Resolved Problems:    * No resolved hospital problems. *      FOLLOW UP  Future Appointments   Date Time Provider Department Center   3/1/2022  1:20 PM Quinn Chandler [facility] None     Sushila Manzano M.D.  [address] [place] [id number]  [phone]    Schedule an appointment as soon as possible for a visit in 1 week  As needed      MEDICATIONS ON DISCHARGE     Medication List      CHANGE how you take these medications      Instructions   sevelamer carbonate 800 MG Tabs tablet  What changed: See the new instructions.  Commonly known as: Renvela   TAKE 4 TABLETS BY MOUTH THREE TIMES DAILY WITH MEALS, AND 2 TABLETS BY MOUTH WITH SNACKS     tizanidine 4 MG Tabs  What changed: See the new instructions.  Commonly known as: ZANAFLEX   TAKE 2 TABLETS BY MOUTH AT BEDTIME AS NEEDED FOR MUSCLE SPASMS        CONTINUE taking these medications      Instructions   amLODIPine 10 MG Tabs  Commonly known as: NORVASC   Take 1 tablet by mouth every day.  Dose: 10 mg     lactulose 10 GM/15ML Soln   Take 20 g by mouth 1 time a day as needed (Constipation). 30 mL = 20 mg.  Dose: 20 g     * Oxycodone HCl 20 MG Tabs   Doctor's comments: Diagnosis Association: Peripheral polyneuropathy (G62.9); Avascular necrosis of bone of hip, left (HCC) (M87.052); Chronic bilateral low back pain without sciatica (M54.5  ", G89.29)  Take 1 Tablet by mouth 4 times a day as needed (pain) for up to 30 days.  Dose: 1 Tablet     * Oxycodone HCl 20 MG Tabs  Start taking on: February 8, 2022   Doctor's comments: Diagnosis Association: Peripheral polyneuropathy (G62.9); Avascular necrosis of bone of hip, left (HCC) (M87.052); Chronic bilateral low back pain without sciatica (M54.5 , G89.29)  Take 1 Tablet by mouth 4 times a day as needed (pain) for up to 28 days.  Dose: 1 Tablet     pregabalin 50 MG capsule  Start taking on: February 12, 2022  Commonly known as: LYDOUGA   Doctor's comments: Please add to script.  Patient should have one remaining from last script refill due 1/13/2022  Take 1 Capsule by mouth 2 times a day for 30 days.  Dose: 50 mg     promethazine 25 MG Tabs  Commonly known as: PHENERGAN   Take 1 Tablet by mouth every 8 hours as needed for Nausea/Vomiting.  Dose: 25 mg     Vimpat 100 MG Tabs tablet  Generic drug: lacosamide   Take 100 mg by mouth 2 times a day. Indications: seizures  Dose: 100 mg         * This list has 2 medication(s) that are the same as other medications prescribed for you. Read the directions carefully, and ask your doctor or other care provider to review them with you.                Allergies  Allergies   Allergen Reactions   • Lorazepam [Ativan] Anxiety and Unspecified     Patient becomes severely paranoid and agitated, hallucinates   • Morphine Itching and Swelling     Tolerates Dilaudid  \"Swelling in mouth\"       DIET  Orders Placed This Encounter   Procedures   • Diet Order Diet: Renal     Standing Status:   Standing     Number of Occurrences:   1     Order Specific Question:   Diet:     Answer:   Renal [8]       ACTIVITY  As tolerated.  Weight bearing as tolerated    CONSULTATIONS  Gynecology    PROCEDURES  Multiple blood transfusions    LABORATORY  Lab Results   Component Value Date    SODIUM 139 02/07/2022    POTASSIUM 5.7 (H) 02/07/2022    CHLORIDE 100 02/07/2022    CO2 22 02/07/2022    GLUCOSE " 103 (H) 02/07/2022    BUN 78 (HH) 02/07/2022    CREATININE 14.96 (HH) 02/07/2022    CREATININE 0.9 12/13/2008        Lab Results   Component Value Date    WBC 4.2 (L) 02/07/2022    HEMOGLOBIN 8.9 (L) 02/07/2022    HEMATOCRIT 27.5 (L) 02/07/2022    PLATELETCT 130 (L) 02/07/2022        Total time of the discharge process exceeds 45 minutes.

## 2022-02-07 NOTE — PROGRESS NOTES
"Debby Carrizales HD # 3    42 yo female with end stage renal disease had AUB with bleeding x 2 weeks prior to coming to the ER. She had severe anemia and has recieved blood transfusion, Megace and IV CEE. Pt is currently not having any VB.    /81   Pulse 77   Temp 36.7 °C (98.1 °F) (Temporal)   Resp 13   Ht 1.676 m (5' 6\")   Wt 82 kg (180 lb 12.4 oz)   SpO2 98%   Alert female in NAD    Meds:   No current facility-administered medications on file prior to encounter.     Current Outpatient Medications on File Prior to Encounter   Medication Sig Dispense Refill   • [START ON 2/8/2022] Oxycodone HCl 20 MG Tab Take 1 Tablet by mouth 4 times a day as needed (pain) for up to 28 days. 98 Tablet 0   • Oxycodone HCl 20 MG Tab Take 1 Tablet by mouth 4 times a day as needed (pain) for up to 30 days. 105 Tablet 0   • [START ON 2/12/2022] pregabalin (LYRICA) 50 MG capsule Take 1 Capsule by mouth 2 times a day for 30 days. 60 Capsule 1   • promethazine (PHENERGAN) 25 MG Tab Take 1 Tablet by mouth every 8 hours as needed for Nausea/Vomiting. 60 Tablet 11   • lactulose 10 GM/15ML Solution Take 20 g by mouth 1 time a day as needed (Constipation). 30 mL = 20 mg.     • tizanidine (ZANAFLEX) 4 MG Tab TAKE 2 TABLETS BY MOUTH AT BEDTIME AS NEEDED FOR MUSCLE SPASMS (Patient taking differently: Take 8 mg by mouth at bedtime as needed.) 180 tablet 3   • amLODIPine (NORVASC) 10 MG Tab Take 1 tablet by mouth every day. 90 tablet 3   • lacosamide (VIMPAT) 100 MG Tab tablet Take 100 mg by mouth 2 times a day. Indications: seizures         Lab:   Recent Results (from the past 48 hour(s))   HEPATITIS PANEL ACUTE(4 COMPONENTS)    Collection Time: 02/05/22  9:00 AM   Result Value Ref Range    Hepatitis B Surface Antigen Non-Reactive Non-Reactive    Hepatitis B Cors Ab,IgM Non-Reactive Non-Reactive    Hepatitis A Virus Ab, IgM Non-Reactive Non-Reactive    Hepatitis C Antibody Non-Reactive Non-Reactive   HEP B SURFACE AB    " Collection Time: 02/05/22  9:00 AM   Result Value Ref Range    Hep B Surface Antibody Quant 21.10 (H) 0.00 - 10.00 mIU/mL   HEMOGLOBIN AND HEMATOCRIT    Collection Time: 02/05/22  5:14 PM   Result Value Ref Range    Hemoglobin 9.0 (L) 12.0 - 16.0 g/dL    Hematocrit 26.4 (L) 37.0 - 47.0 %   Comp Metabolic Panel (CMP)    Collection Time: 02/06/22  1:38 AM   Result Value Ref Range    Sodium 138 135 - 145 mmol/L    Potassium 5.6 (H) 3.6 - 5.5 mmol/L    Chloride 99 96 - 112 mmol/L    Co2 19 (L) 20 - 33 mmol/L    Anion Gap 20.0 (H) 7.0 - 16.0    Glucose 96 65 - 99 mg/dL    Bun 83 (HH) 8 - 22 mg/dL    Creatinine 15.54 (HH) 0.50 - 1.40 mg/dL    Calcium 8.0 (L) 8.5 - 10.5 mg/dL    AST(SGOT) 11 (L) 12 - 45 U/L    ALT(SGPT) 5 2 - 50 U/L    Alkaline Phosphatase 119 (H) 30 - 99 U/L    Total Bilirubin 0.3 0.1 - 1.5 mg/dL    Albumin 3.0 (L) 3.2 - 4.9 g/dL    Total Protein 5.8 (L) 6.0 - 8.2 g/dL    Globulin 2.8 1.9 - 3.5 g/dL    A-G Ratio 1.1 g/dL   IONIZED CALCIUM    Collection Time: 02/06/22  1:38 AM   Result Value Ref Range    Ionized Calcium 0.9 (L) 1.1 - 1.3 mmol/L   MAGNESIUM    Collection Time: 02/06/22  1:38 AM   Result Value Ref Range    Magnesium 2.3 1.5 - 2.5 mg/dL   PHOSPHORUS    Collection Time: 02/06/22  1:38 AM   Result Value Ref Range    Phosphorus 10.9 (HH) 2.5 - 4.5 mg/dL   ESTIMATED GFR    Collection Time: 02/06/22  1:38 AM   Result Value Ref Range    GFR If  3 (A) >60 mL/min/1.73 m 2    GFR If Non African American 3 (A) >60 mL/min/1.73 m 2   CBC WITH DIFFERENTIAL    Collection Time: 02/06/22  3:19 AM   Result Value Ref Range    WBC 3.0 (L) 4.8 - 10.8 K/uL    RBC 2.89 (L) 4.20 - 5.40 M/uL    Hemoglobin 8.8 (L) 12.0 - 16.0 g/dL    Hematocrit 26.6 (L) 37.0 - 47.0 %    MCV 92.0 81.4 - 97.8 fL    MCH 30.4 27.0 - 33.0 pg    MCHC 33.1 (L) 33.6 - 35.0 g/dL    RDW 52.2 (H) 35.9 - 50.0 fL    Platelet Count 124 (L) 164 - 446 K/uL    MPV 10.6 9.0 - 12.9 fL    Neutrophils-Polys 58.80 44.00 - 72.00 %     Lymphocytes 23.80 22.00 - 41.00 %    Monocytes 11.20 0.00 - 13.40 %    Eosinophils 3.60 0.00 - 6.90 %    Basophils 1.30 0.00 - 1.80 %    Immature Granulocytes 1.30 (H) 0.00 - 0.90 %    Nucleated RBC 0.00 /100 WBC    Neutrophils (Absolute) 1.78 (L) 2.00 - 7.15 K/uL    Lymphs (Absolute) 0.72 (L) 1.00 - 4.80 K/uL    Monos (Absolute) 0.34 0.00 - 0.85 K/uL    Eos (Absolute) 0.11 0.00 - 0.51 K/uL    Baso (Absolute) 0.04 0.00 - 0.12 K/uL    Immature Granulocytes (abs) 0.04 0.00 - 0.11 K/uL    NRBC (Absolute) 0.00 K/uL   CBC WITHOUT DIFFERENTIAL    Collection Time: 02/07/22  1:12 AM   Result Value Ref Range    WBC 4.2 (L) 4.8 - 10.8 K/uL    RBC 2.90 (L) 4.20 - 5.40 M/uL    Hemoglobin 8.9 (L) 12.0 - 16.0 g/dL    Hematocrit 27.5 (L) 37.0 - 47.0 %    MCV 94.8 81.4 - 97.8 fL    MCH 30.7 27.0 - 33.0 pg    MCHC 32.4 (L) 33.6 - 35.0 g/dL    RDW 52.5 (H) 35.9 - 50.0 fL    Platelet Count 130 (L) 164 - 446 K/uL    MPV 10.6 9.0 - 12.9 fL   Basic Metabolic Panel    Collection Time: 02/07/22  1:12 AM   Result Value Ref Range    Sodium 139 135 - 145 mmol/L    Potassium 5.7 (H) 3.6 - 5.5 mmol/L    Chloride 100 96 - 112 mmol/L    Co2 22 20 - 33 mmol/L    Glucose 103 (H) 65 - 99 mg/dL    Bun 78 (HH) 8 - 22 mg/dL    Creatinine 14.96 (HH) 0.50 - 1.40 mg/dL    Calcium 7.2 (L) 8.5 - 10.5 mg/dL    Anion Gap 17.0 (H) 7.0 - 16.0   ESTIMATED GFR    Collection Time: 02/07/22  1:12 AM   Result Value Ref Range    GFR If  3 (A) >60 mL/min/1.73 m 2    GFR If Non African American 3 (A) >60 mL/min/1.73 m 2       Assessment and Plan  1. AUB  2. Anemia from blood loss and chronic disease  3. ESRD  4. Lupus    H/H is stable  She is no longer having any VB  Pt should rec Depo Provera 150 mg on discharge  Will sign off GYN care at this time  Call if any further assistance needed

## 2022-02-07 NOTE — CARE PLAN
The patient is Watcher - Medium risk of patient condition declining or worsening    Shift Goals  Clinical Goals: safety, maintain hgb  Patient Goals: rest, pain/nausea control  Family Goals: n/a    Progress made toward(s) clinical / shift goals: yes     Patient is not progressing towards the following goals: none    RN will continue to monitor patients and implement interventions as appropriate.      Problem: Knowledge Deficit - Standard  Goal: Patient and family/care givers will demonstrate understanding of plan of care, disease process/condition, diagnostic tests and medications  Outcome: Progressing     Problem: Pain - Standard  Goal: Alleviation of pain or a reduction in pain to the patient’s comfort goal  Outcome: Progressing     Problem: Fall Risk  Goal: Patient will remain free from falls  Outcome: Progressing

## 2022-02-07 NOTE — DISCHARGE PLANNING
Care Transition Team Discharge Planning    Anticipated Discharge Disposition: d/c home, no needs    Action: Lsw spoke to hospitalist for AM rounds.    Pt will d/c home today, and has no social needs     Barriers to Discharge: none    Plan: Pt will d/c home today w/o any social needs.

## 2022-02-07 NOTE — PROGRESS NOTES
[facility] treatment aseptically disconnected at 0520.Effluent clear,yelow,no fibrin.Net  ml./46.PD cath dressing change due tonight.Report given to primary Rn.

## 2022-02-07 NOTE — DISCHARGE INSTRUCTIONS
Discharge Instructions    Discharged to home by car with relative. Discharged via wheelchair, hospital escort: Yes.  Special equipment needed: Not Applicable    Be sure to schedule a follow-up appointment with your primary care doctor or any specialists as instructed.     Discharge Plan:        I understand that a diet low in cholesterol, fat, and sodium is recommended for good health. Unless I have been given specific instructions below for another diet, I accept this instruction as my diet prescription.   Other diet: regular    Special Instructions: None    · Is patient discharged on Warfarin / Coumadin?   No           Depression / Suicide Risk    As you are discharged from this [facility] facility, it is important to learn how to keep safe from harming yourself.    Recognize the warning signs:  · Abrupt changes in personality, positive or negative- including increase in energy   · Giving away possessions  · Change in eating patterns- significant weight changes-  positive or negative  · Change in sleeping patterns- unable to sleep or sleeping all the time   · Unwillingness or inability to communicate  · Depression  · Unusual sadness, discouragement and loneliness  · Talk of wanting to die  · Neglect of personal appearance   · Rebelliousness- reckless behavior  · Withdrawal from people/activities they love  · Confusion- inability to concentrate     If you or a loved one observes any of these behaviors or has concerns about self-harm, here's what you can do:  · Talk about it- your feelings and reasons for harming yourself  · Remove any means that you might use to hurt yourself (examples: pills, rope, extension cords, firearm)  · Get professional help from the community (Mental Health, Substance Abuse, psychological counseling)  · Do not be alone:Call your Safe Contact- someone whom you trust who will be there for you.  · Call your local CRISIS HOTLINE 102-8445 or 329-386-7709  · Call your local Children's Mobile  Crisis Response Team [place] (462) 795-3883 or www.Dealdrive  · Call the toll free National Suicide Prevention Hotlines   · National Suicide Prevention Lifeline 123-662-N[place]FW (9395)  · National Hope Line Network 800-SUICIDE (481-8327)      Anemia    Anemia is a condition in which you do not have enough red blood cells or hemoglobin. Hemoglobin is a substance in red blood cells that carries oxygen. When you do not have enough red blood cells or hemoglobin (are anemic), your body cannot get enough oxygen and your organs may not work properly. As a result, you may feel very tired or have other problems.  What are the causes?  Common causes of anemia include:  · Excessive bleeding. Anemia can be caused by excessive bleeding inside or outside the body, including bleeding from the intestine or from periods in women.  · Poor nutrition.  · Long-lasting (chronic) kidney, thyroid, and liver disease.  · Bone marrow disorders.  · Cancer and treatments for cancer.  · HIV (human immunodeficiency virus) and AIDS (acquired immunodeficiency syndrome).  · Treatments for HIV and AIDS.  · Spleen problems.  · Blood disorders.  · Infections, medicines, and autoimmune disorders that destroy red blood cells.  What are the signs or symptoms?  Symptoms of this condition include:  · Minor weakness.  · Dizziness.  · Headache.  · Feeling heartbeats that are irregular or faster than normal (palpitations).  · Shortness of breath, especially with exercise.  · Paleness.  · Cold sensitivity.  · Indigestion.  · Nausea.  · Difficulty sleeping.  · Difficulty concentrating.  Symptoms may occur suddenly or develop slowly. If your anemia is mild, you may not have symptoms.  How is this diagnosed?  This condition is diagnosed based on:  · Blood tests.  · Your medical history.  · A physical exam.  · Bone marrow biopsy.  Your health care provider may also check your stool (feces) for blood and may do additional testing to look for the cause of  your bleeding.  You may also have other tests, including:  · Imaging tests, such as a CT scan or MRI.  · Endoscopy.  · Colonoscopy.  How is this treated?  Treatment for this condition depends on the cause. If you continue to lose a lot of blood, you may need to be treated at a hospital. Treatment may include:  · Taking supplements of iron, vitamin B12, or folic acid.  · Taking a hormone medicine (erythropoietin) that can help to stimulate red blood cell growth.  · Having a blood transfusion. This may be needed if you lose a lot of blood.  · Making changes to your diet.  · Having surgery to remove your spleen.  Follow these instructions at home:  · Take over-the-counter and prescription medicines only as told by your health care provider.  · Take supplements only as told by your health care provider.  · Follow any diet instructions that you were given.  · Keep all follow-up visits as told by your health care provider. This is important.  Contact a health care provider if:  · You develop new bleeding anywhere in the body.  Get help right away if:  · You are very weak.  · You are short of breath.  · You have pain in your abdomen or chest.  · You are dizzy or feel faint.  · You have trouble concentrating.  · You have bloody or black, tarry stools.  · You vomit repeatedly or you vomit up blood.  Summary  · Anemia is a condition in which you do not have enough red blood cells or enough of a substance in your red blood cells that carries oxygen (hemoglobin).  · Symptoms may occur suddenly or develop slowly.  · If your anemia is mild, you may not have symptoms.  · This condition is diagnosed with blood tests as well as a medical history and physical exam. Other tests may be needed.  · Treatment for this condition depends on the cause of the anemia.  This information is not intended to replace advice given to you by your health care provider. Make sure you discuss any questions you have with your health care  provider.  Document Released: 01/25/2006 Document Revised: 11/30/2018 Document Reviewed: 01/19/2018  Elsevier Patient Education © 2020 [employer]      Hyperkalemia  Hyperkalemia is when you have too much potassium in your blood. Potassium helps your body in many ways, but having too much can cause problems. If there is too much potassium in your blood, it can affect how your heart works.  Potassium is normally removed from your body by your kidneys. Many things can cause the amount in your blood to be high. Medicines and other treatments can be used to bring the amount to a normal level. Treatment may need to be done in the hospital.  Follow these instructions at home:    · Take over-the-counter and prescription medicines only as told by your doctor.  · Do not take any of the following unless your doctor says it is okay:  ? Supplements.  ? Natural products.  ? Herbs.  ? Vitamins.  · Limit how much alcohol you drink as told by your doctor.  · Do not use drugs. If you need help quitting, ask your doctor.  · If you have kidney disease, you may need to follow a low-potassium diet. A  (dietitian) can help you.  · Keep all follow-up visits as told by your doctor. This is important.  Contact a doctor if:  · Your heartbeat is not regular or is very slow.  · You feel dizzy (light-headed).  · You feel weak.  · You feel sick to your stomach (nauseous).  · You have tingling in your hands or feet.  · You lose feeling (have numbness) in your hands or feet.  Get help right away if:  · You are short of breath.  · You have chest pain.  · You pass out (faint).  · You cannot move your muscles.  Summary  · Hyperkalemia is when you have too much potassium in your blood.  · Take over-the-counter and prescription medicines only as told by your doctor.  · Limit how much alcohol you drink as told by your doctor.  · Contact a doctor if your heartbeat is not regular.  This information is not intended to replace advice given  to you by your health care provider. Make sure you discuss any questions you have with your health care provider.  Document Released: 12/18/2006 Document Revised: 12/03/2018 Document Reviewed: 12/03/2018  Elsevier Patient Education © 2020 [employer]

## 2022-02-07 NOTE — PROGRESS NOTES
[facility] Progress Note     CCPD initiated at 1835 per Dr. Douglas x 10 hours using 2.5% PD solution.   Patient awake and alert; on oxygen at 2LPM via NC; uses bedside commode with minimal assistance prior to ccpd hook up     Aseptically connected PD cycler to PD catheter.   Exit site cleansed, dressing changed CDI; no s/s infection noted.      Report given to Primary RN   And on-call Dialysis RN and contact information ([phone]).      Please call for any issues with hemodynamic instability/persistent alarms/patient not tolerating therapy.

## 2022-02-07 NOTE — DISCHARGE PLANNING
Outpatient Dialysis Note    Confirmed patient is active at:    [facility]  [address] [facility] [address]  [place] [address]    Schedule: PD    Patient is seen by Dr. Najjar in HD clinic.    Spoke with  at facility who confirmed.    Forwarded records for review.    Elicia Ortiz- Dialysis Coordinator # [phone]  [facility]

## 2022-02-07 NOTE — PROGRESS NOTES
S/w Betty [facility]. Pt not ready yet to come to [facility]. RN still has to give some medications and discontinue port. RN will contact [facility] when pt ready.

## 2022-02-08 ENCOUNTER — PATIENT OUTREACH (OUTPATIENT)
Dept: MEDICAL GROUP | Facility: MEDICAL CENTER | Age: 42
End: 2022-02-08

## 2022-02-11 NOTE — PROGRESS NOTES
RN Transitional Care Management discharge follow up call attempted x2, LVM with return call number. Patient does not have discharge follow up appointment scheduled with PCP at this time.  Please route chart back to RN if additional follow up is needed/requested.     Thank you!    JOSETTE Geronimo Care Coordinator  [facility] [phone]  Chronic Care Management [phone]

## 2022-03-01 ENCOUNTER — OFFICE VISIT (OUTPATIENT)
Dept: PHYSICAL MEDICINE AND REHAB | Facility: MEDICAL CENTER | Age: 42
End: 2022-03-01
Payer: MEDICARE

## 2022-03-01 VITALS
DIASTOLIC BLOOD PRESSURE: 84 MMHG | WEIGHT: 188.93 LBS | TEMPERATURE: 98.9 F | SYSTOLIC BLOOD PRESSURE: 130 MMHG | HEIGHT: 65 IN | HEART RATE: 88 BPM | OXYGEN SATURATION: 96 % | BODY MASS INDEX: 31.48 KG/M2

## 2022-03-01 DIAGNOSIS — M79.10 MYALGIA: ICD-10-CM

## 2022-03-01 DIAGNOSIS — Z99.2 ESRD (END STAGE RENAL DISEASE) ON DIALYSIS (HCC): ICD-10-CM

## 2022-03-01 DIAGNOSIS — R53.83 OTHER FATIGUE: ICD-10-CM

## 2022-03-01 DIAGNOSIS — M79.7 FIBROMYALGIA: ICD-10-CM

## 2022-03-01 DIAGNOSIS — N18.6 ANEMIA DUE TO CHRONIC KIDNEY DISEASE, ON CHRONIC DIALYSIS (HCC): ICD-10-CM

## 2022-03-01 DIAGNOSIS — N18.6 ESRD (END STAGE RENAL DISEASE) ON DIALYSIS (HCC): ICD-10-CM

## 2022-03-01 DIAGNOSIS — D63.1 ANEMIA DUE TO CHRONIC KIDNEY DISEASE, ON CHRONIC DIALYSIS (HCC): ICD-10-CM

## 2022-03-01 DIAGNOSIS — M87.052 AVASCULAR NECROSIS OF BONE OF HIP, LEFT (HCC): ICD-10-CM

## 2022-03-01 DIAGNOSIS — F11.90 CHRONIC, CONTINUOUS USE OF OPIOIDS: ICD-10-CM

## 2022-03-01 DIAGNOSIS — G89.29 CHRONIC BILATERAL LOW BACK PAIN WITHOUT SCIATICA: ICD-10-CM

## 2022-03-01 DIAGNOSIS — Z96.641 HISTORY OF TOTAL RIGHT HIP REPLACEMENT: ICD-10-CM

## 2022-03-01 DIAGNOSIS — M79.651 PAIN OF RIGHT THIGH: ICD-10-CM

## 2022-03-01 DIAGNOSIS — Z99.2 ANEMIA DUE TO CHRONIC KIDNEY DISEASE, ON CHRONIC DIALYSIS (HCC): ICD-10-CM

## 2022-03-01 DIAGNOSIS — G62.9 PERIPHERAL POLYNEUROPATHY: ICD-10-CM

## 2022-03-01 DIAGNOSIS — M54.50 CHRONIC BILATERAL LOW BACK PAIN WITHOUT SCIATICA: ICD-10-CM

## 2022-03-01 PROCEDURE — 99214 OFFICE O/P EST MOD 30 MIN: CPT | Performed by: PHYSICAL MEDICINE & REHABILITATION

## 2022-03-01 RX ORDER — PREGABALIN 50 MG/1
50 CAPSULE ORAL 2 TIMES DAILY
Qty: 56 CAPSULE | Refills: 2 | Status: SHIPPED | OUTPATIENT
Start: 2022-03-08 | End: 2022-04-05

## 2022-03-01 RX ORDER — OXYCODONE HYDROCHLORIDE 20 MG/1
1 TABLET ORAL 4 TIMES DAILY PRN
Qty: 98 TABLET | Refills: 0 | Status: SHIPPED | OUTPATIENT
Start: 2022-03-08 | End: 2022-04-05

## 2022-03-01 RX ORDER — OXYCODONE HYDROCHLORIDE 20 MG/1
1 TABLET ORAL 4 TIMES DAILY PRN
Qty: 98 TABLET | Refills: 0 | Status: SHIPPED | OUTPATIENT
Start: 2022-04-05 | End: 2022-05-03

## 2022-03-01 ASSESSMENT — PATIENT HEALTH QUESTIONNAIRE - PHQ9: CLINICAL INTERPRETATION OF PHQ2 SCORE: 0

## 2022-03-01 ASSESSMENT — PAIN SCALES - GENERAL: PAINLEVEL: 6=MODERATE PAIN

## 2022-03-01 ASSESSMENT — FIBROSIS 4 INDEX: FIB4 SCORE: 1.48

## 2022-03-01 NOTE — PROGRESS NOTES
"Follow up patient note  Pain Medicine, Interventional spine and sports physiatry, Physical medicine rehabilitation      Chief complaint:   Diffuse joint and body pain, low back, hips and legs    HISTORY      HPI  Patient identification/Interval history:   Debby Carrizales 41 y.o. female who has chronic pain related to rheumatologic disease, peripheral neuropathy and AVN hips, lupus.      Since the last visit, Debby was hospitalized for severe anemia second to prolonged menstrual bleeding.  Her Hb was down to 3.9 on 02/04/2022. She reports that she has felt fatigue and a feeling of weakness in her legs.  This is slowly improving.       Pain is in axial spine and legs bilaterally.  Legs feel \"crampy.\"  Back has been sore.    Current oxycodone dose is helpful in managing her pain.  Right hip has been more painful with walking.  Left hip is better.  Stable lyrica 50mg po bid.    She continues PD and continues to follow-up with her Nephrologist.    Pain is a 7/10 on the NRS.      Bowel movements are regular with lactulose and stool softeners.        ORT: 3  PHQ-9:0    Review of records:   Hospital discharge report noted from 08/12/2019-08/21/2019 admission.  She was admitted for a near syncopal episode.  Cardiac evaluation was suspicious for endocarditis and she was started on empiric IV abx.  GAYATHRI demonstrate no endocarditis, but did show that her venous catheter was pushing through the tricuspid valve.  Cultures were negative.  Dr. Jansen, vascular surgery, was consulted and they discussed follow-up plans for port management and this will be planned after discharge.    ROS   Unchanged except as in HPI     Red Flags :   Chills, Sweats:No fevers, chills and night sweats.  No fevers lately  Involuntary Weight Loss: Denies  Bowel/Bladder Incontinence: Denies  Saddle Anesthesia: Denies    PMHx:   Past Medical History:   Diagnosis Date   • Anemia    • Arthritis     all joints,r/t lupus   • Avascular necrosis of " bones of both hips ([facility]) 10/10/2016   • Blood clotting disorder ([facility])     in AV Graft   • Bowel habit changes     constipation   • Closed nondisplaced fracture of base of fifth metacarpal bone of left hand 2/1/2019   • Clostridium difficile colitis 5/3/2011   • Continuous ambulatory peritoneal dialysis status ([facility])    • Dialysis patient    • Environmental and seasonal allergies    • ESBL (extended spectrum beta-lactamase) producing bacteria infection 8/25/2014   • Fall    • Fibromyalgia 11/30/2021    6/10   • High anion gap metabolic acidosis 4/2/2011   • Hypertension    • Lupus ([facility])    • Pneumonia    • Psychiatric disorder     anxiety, depression   • Pyelonephritis 11/21/2017   • Renal failure     Dr. Najjar   • Sepsis due to pneumonia ([facility]) 6/7/2018   • Status epilepticus ([facility])     last seizure        PSHx:   Past Surgical History:   Procedure Laterality Date   • DC REMOVAL TUNNELED CV CATH Right 12/1/2021    Procedure: REMOVAL, TUNNELED DIALYSIS CATHETER;  Surgeon: Víctor Berg M.D.;  Location: [facility];  Service: Vascular   • CATH PLACEMENT Left 12/1/2021    Procedure: INSERTION, CATHETER - MEDIPORT;  Surgeon: Víctor Berg M.D.;  Location: [facility];  Service: Vascular   • CATH PLACEMENT CAPD N/A 5/5/2021    Procedure: INSERTION, CATHETER, CAPD;  Surgeon: Víctor Berg M.D.;  Location: [facility];  Service: Vascular   • THROMBECTOMY Right 12/7/2020    Procedure: Right upper extremity arteriovenous graft thrombectomy;  Surgeon: Daniel Poole M.D.;  Location: SURGERY [facility];  Service: Vascular   • CATH PLACEMENT Left 12/7/2020    Procedure: port-a-catheter removal;  Surgeon: Daniel Poole M.D.;  Location: SURGERY [facility];  Service: Vascular   • GAYATHRI N/A 8/20/2019    Procedure: ECHOCARDIOGRAM, TRANSESOPHAGEAL;  Surgeon: Marta Elaine M.D.;  Location: [facility];  Service: Cardiac   • AV FISTULA REVISION Right 8/11/2018     Procedure: AV FISTULA REVISION- Graft and Thrombectomy;  Surgeon: Shabbir Ardon M.D.;  Location: [facility];  Service: General   • AV FISTULA THROMBOLYSIS Right 8/11/2018    Procedure: AV FISTULA THROMBOLYSIS;  Surgeon: Shabbir Ardon M.D.;  Location: [facility];  Service: General   • AV FISTULA CREATION Right 12/9/2017    Procedure: AV FISTULA CREATION-ARM FOR GRAFT;  Surgeon: Lesly Jansen M.D.;  Location: SURGERY [facility];  Service: General   • THROMBECTOMY  12/9/2017    Procedure: THROMBECTOMY;  Surgeon: Lesly Jansen M.D.;  Location: [facility];  Service: General   • THROMBECTOMY Right 8/21/2016    Procedure: THROMBECTOMY - right AV fistula graft with grams;  Surgeon: Shabbir Ardon M.D.;  Location: [facility];  Service:    • ANGIOPLASTY BALLOON  8/21/2016    Procedure: ANGIOPLASTY BALLOON;  Surgeon: Shabbir Ardon M.D.;  Location: [facility];  Service:    • THROMBECTOMY Right 8/20/2016    Procedure: THROMBECTOMY AV GRAFT;  Surgeon: Shabbir Ardon M.D.;  Location: [facility];  Service:    • AV FISTULA CREATION Right 7/12/2016    Procedure: AV FISTULA CREATION WITH GRAFT BRACHIAL AXILLARY;  Surgeon: Shabbir Ardon M.D.;  Location: [facility];  Service:    • CLOSED REDUCTION Right 7/5/2016    Procedure: CLOSED REDUCTION- Hip ;  Surgeon: Michael Holman M.D.;  Location: [facility];  Service:    • HIP ARTHROPLASTY TOTAL Right 1/18/2016    Procedure: HIP ARTHROPLASTY TOTAL;  Surgeon: Michael Holman M.D.;  Location: [facility];  Service:    • AV FISTULA CREATION  2/3/2015    Performed by Shabbir Ardon M.D. at [facility]   • AV FISTULA CREATION  11/14/2014    Performed by Shabbir Ardon M.D. at [facility]   • AV FISTULA CREATION  9/9/2014    Performed by Shabbir Ardon M.D. at [facility]   • CATH PLACEMENT  9/9/2014     Performed by Shabbir Ardon M.D. at SURGERY [facility] ORS   • OTHER  5/2011    tracheostomy   • GASTROSCOPY-ENDO  4/27/2011    Performed by PALMIRA DOAN at ENDOSCOPY [facility] ORS   • COLONOSCOPY WITH BIOPSY  4/20/2011    Performed by ALCIRA CRUZ at ENDOSCOPY [facility] ORS   • GASTROSCOPY-ENDO  4/18/2011    Performed by ALCIRA CRUZ at ENDOSCOPY [facility] ORS   • GASTROSCOPY-ENDO  4/10/2011    Performed by SYED JURADO at ENDOSCOPY [facility] ORS   • SCLEROTHERAPHY  4/10/2011    Performed by SYED JURADO at ENDOSCOPY [facility] ORS   • OTHER ABDOMINAL SURGERY      kidney biopsy   • TRACHEOSTOMY         Family history   Family History   Problem Relation Age of Onset   • Heart Disease Mother    • Cancer Father        Medications:   Outpatient Medications Marked as Taking for the 3/1/22 encounter (Office Visit) with Quinn Chandler M.D.   Medication Sig Dispense Refill   • [START ON 3/8/2022] pregabalin (LYRICA) 50 MG capsule Take 1 Capsule by mouth 2 times a day for 28 days. 56 Capsule 2   • [START ON 3/8/2022] Oxycodone HCl 20 MG Tab Take 1 Tablet by mouth 4 times a day as needed (pain) for up to 28 days. 98 Tablet 0   • [START ON 4/5/2022] Oxycodone HCl 20 MG Tab Take 1 Tablet by mouth 4 times a day as needed (pain) for up to 28 days. 98 Tablet 0   • sevelamer carbonate (RENVELA) 800 MG Tab tablet TAKE 4 TABLETS BY MOUTH THREE TIMES DAILY WITH MEALS, AND 2 TABLETS BY MOUTH WITH SNACKS 420 Tablet 11   • promethazine (PHENERGAN) 25 MG Tab Take 1 Tablet by mouth every 8 hours as needed for Nausea/Vomiting. 60 Tablet 11   • lactulose 10 GM/15ML Solution Take 20 g by mouth 1 time a day as needed (Constipation). 30 mL = 20 mg.     • tizanidine (ZANAFLEX) 4 MG Tab TAKE 2 TABLETS BY MOUTH AT BEDTIME AS NEEDED FOR MUSCLE SPASMS (Patient taking differently: Take 8 mg by mouth at bedtime as needed.) 180 tablet 3   • amLODIPine (NORVASC) 10 MG Tab Take 1 tablet by mouth every day.  "90 tablet 3   • lacosamide (VIMPAT) 100 MG Tab tablet Take 100 mg by mouth 2 times a day. Indications: seizures         Allergies:   Allergies   Allergen Reactions   • Lorazepam [Ativan] Anxiety and Unspecified     Patient becomes severely paranoid and agitated, hallucinates   • Morphine Itching and Swelling     Tolerates Dilaudid  \"Swelling in mouth\"       Social Hx:   Social History     Socioeconomic History   • Marital status: Single     Spouse name: Not on file   • Number of children: Not on file   • Years of education: Not on file   • Highest education level: Not on file   Occupational History   • Not on file   Tobacco Use   • Smoking status: Former Smoker     Packs/day: 0.50     Years: 18.00     Pack years: 9.00     Types: Cigarettes     Quit date: 6/13/2011     Years since quitting: 10.7   • Smokeless tobacco: Never Used   • Tobacco comment: vaping now   Vaping Use   • Vaping Use: Some days   • Substances: Flavoring   • Devices: Disposable   Substance and Sexual Activity   • Alcohol use: No     Alcohol/week: 0.0 oz   • Drug use: No     Types: Marijuana   • Sexual activity: Not Currently     Partners: Male   Other Topics Concern   •  Service No   • Blood Transfusions Yes   • Caffeine Concern No   • Occupational Exposure No   • Hobby Hazards No   • Sleep Concern Yes   • Stress Concern Yes   • Weight Concern Yes   • Special Diet Yes   • Back Care No   • Exercise Yes   • Bike Helmet No   • Seat Belt Yes   • Self-Exams Yes   Social History Narrative   • Not on file     Social Determinants of Health     Financial Resource Strain: Not on file   Food Insecurity: Not on file   Transportation Needs: Not on file   Physical Activity: Not on file   Stress: Not on file   Social Connections: Not on file   Intimate Partner Violence: Not on file   Housing Stability: Not on file       EXAMINATION     Physical Exam:   Vitals: /84 (BP Location: Right arm, Patient Position: Sitting, BP Cuff Size: Adult long)   " "Pulse 88   Temp 37.2 °C (98.9 °F) (Temporal)   Ht 1.651 m (5' 5\")   Wt 85.7 kg (188 lb 15 oz)   SpO2 96%     Constitutional:   Body Habitus: Body mass index is 31.44 kg/m².  Cooperation: Fully cooperates with exam  Appearance: Well-groomed no disheveled, in no acute distress, wearing a face mask   Respiratory-  breathing comfortable on room air, no wheezing.  Cardiovascular- trace edema in the lower extremities  Psychiatric- alert and oriented ×3. Normal affect.   Spine:   No skin lesions, warmth or erythema. Scarring sites anterior chest wall, PD catheter noted  Tenderness to palpation over the thoracic and lumbar paraspinals bilaterally, right greater than the left  No focal motor deficits in the lower extremities bilaterally  Gait slow, steady without loss of balance.  No use of assist device.   Ext: 2+ pitting edema in the lower extremities bilaterally    MEDICAL DECISION MAKING    DATA    Labs:     UDS:  08/20/2021: positive for oxycodone and metabolites  01/08/2021: positive for oxycodone and metabolites  03/12/2020 Positive for oxycodone and metabolites  12/19/2019 oxycodone and metabolites   08/22/2019 Oxycodone and metabolites (patient does not have a script for phenergan with codeine and this was negative) Therefore, consistent with medications  06/13/2019 Negative for oxycodone, positive for other oxycodone metabolites  04/16/2019 Positive for oxymorphone  01/17/2019 Oxycodone and metabolites as expected  10/02/2018 Oxycodone and metabolites, also fentanyl    01/09/2019: Hep B surface ag negative  01/08/2019: GFR 7; BUN 23 Cr 6.68, Plt 160    12/15/2018 CRP 3.03    GFR 12/06/2020 4  GFR 08/21/2019, 4  08/21/2019 WBC 4.6, Hb 10.1, Hct 31.6, Plt 156    BMP 12/16/2018  Na 136, Potassium 4.4, chloride 96, CO2 23 Glucose 83, BUN 55H, Cr 9.44H, Calcium 9.9, Anion gap 17.0H    CBC 3.3, Hb 9.6, Hct 31.3, Plt 115    Lab Results   Component Value Date/Time    HBA1C 4.9 06/07/2018 02:29 AM        Imaging:   I " reviewed the following radiology reports reviewed  GAYATHRI 08/20/2019  Echocardiography Laboratory  CONCLUSIONS  LV EF    65%.  Structurally normal tricuspid valve.  Trace tricuspid regurgitation.  Port catheter noted in the SVC and right atrium.  Tip support appears bright, no obvious vegetation.  The tip of the port abuts the tricuspid valve.  Recommend the port be pulled back approximately 2 cm.  No evidence of endocarditis.    Xray hip with pelvis-left 07/28/2019  Left femoral head heterogeneous density is compatible with known diagnosis of avascular necrosis. There is no joint space narrowing or spurring    Right arthroplasty without evident complication.    Xray left foot 01/21/2019  Nondisplaced transverse fracture through the base of the fifth metatarsal.    MRI lumbar spine 1/1/19  1.  Diffusely decreased signal again seen throughout the marrow space consistent with red marrow conversion, likely secondary to chronic anemia.  2.  No significant disc bulging, central canal narrowing, or foraminal narrowing.  3.  No lumbar spine fracture or subluxation.    MRI brain 12/13/2018  1.  There is no hippocampal sclerosis.  2.  There is no evidence of cortical dysplasia or neuronal migrational abnormalities.  3.  A few nonspecific T2 hyperintensities in the supratentorial white matter likely representing nonspecific foci of gliosis, chronic ischemia and demyelination. This is unchanged since the previous MRI dated 2/23/2012.       Chest xray 06/07/2018: Minimal right-sided opacities as described above, suggesting pneumonia.               Results for orders placed during the hospital encounter of 07/19/17   MR-LUMBAR SPINE-W/O    Impression 1.  Diffuse decreased bone marrow signal intensity throughout the lumbar spine and sacrum, presumably secondary to chronic anemia.    2.  Otherwise unremarkable MRI scan of the lumbar spine without contrast.                                    DIAGNOSIS   Visit Diagnoses     ICD-10-CM    1. Fibromyalgia  M79.7   2. Avascular necrosis of bone of hip, left (HCC)  M87.052   3. Peripheral polyneuropathy  G62.9   4. Chronic bilateral low back pain without sciatica  M54.50    G89.29   5. Chronic, continuous use of opioids  F11.90   6. Myalgia  M79.10   7. ESRD (end stage renal disease) on dialysis ([facility])  N18.6    Z99.2   8. History of total right hip replacement  Z96.641   9. Other fatigue  R53.83   10. Anemia due to chronic kidney disease, on chronic dialysis ([facility])  N18.6    D63.1    Z99.2   11. Pain of right thigh  M79.651         ASSESSMENT and PLAN:     Debby Carrizales 41 y.o. Female returns for follow-up of her chronic pain related to lupus, AVN hips, low back and peripheral neuropathy    Debby was seen today for follow-up.    Diagnoses and all orders for this visit:    Fibromyalgia  -     pregabalin (LYRICA) 50 MG capsule; Take 1 Capsule by mouth 2 times a day for 28 days.    Avascular necrosis of bone of hip, left (HCC)  -     pregabalin (LYRICA) 50 MG capsule; Take 1 Capsule by mouth 2 times a day for 28 days.  -     Oxycodone HCl 20 MG Tab; Take 1 Tablet by mouth 4 times a day as needed (pain) for up to 28 days.  -     Oxycodone HCl 20 MG Tab; Take 1 Tablet by mouth 4 times a day as needed (pain) for up to 28 days.  -     DX-HIP-BILATERAL-WITH PELVIS-3/4 VIEWS; Future  -     Pain Management Screen  -     Consent for Opiate Prescription  -     Controlled Substance Treatment Agreement  -     Referral to Home Health    Peripheral polyneuropathy  -     pregabalin (LYRICA) 50 MG capsule; Take 1 Capsule by mouth 2 times a day for 28 days.  -     Oxycodone HCl 20 MG Tab; Take 1 Tablet by mouth 4 times a day as needed (pain) for up to 28 days.  -     Oxycodone HCl 20 MG Tab; Take 1 Tablet by mouth 4 times a day as needed (pain) for up to 28 days.  -     Pain Management Screen  -     Consent for Opiate Prescription  -     Controlled Substance Treatment Agreement  -     Referral to Home  "Health    Chronic bilateral low back pain without sciatica  -     Oxycodone HCl 20 MG Tab; Take 1 Tablet by mouth 4 times a day as needed (pain) for up to 28 days.  -     Oxycodone HCl 20 MG Tab; Take 1 Tablet by mouth 4 times a day as needed (pain) for up to 28 days.  -     Pain Management Screen  -     Consent for Opiate Prescription  -     Controlled Substance Treatment Agreement    Chronic, continuous use of opioids    Myalgia    ESRD (end stage renal disease) on dialysis ([facility])    History of total right hip replacement  -     DX-HIP-BILATERAL-WITH PELVIS-3/4 VIEWS; Future    Other fatigue  -     Referral to Home Health    Anemia due to chronic kidney disease, on chronic dialysis (HCC)  -     Referral to Home Health    Pain of right thigh  -     DX-FEMUR-2+ RIGHT; Future       1. Discussed referral to Home health to help with mobility and endurance exercises.  She is having to move soon, so this might have to be delayed.  2. Check UDS, obtain through sputum.  3. Continue with lyrica 50mg po bid.  Refill given.  4.  reviewed.  Plan to continue oxycodone 20mg #98/month for the next two months.  5. Xrays of right hip ordered to assess stability of hip replacement, given increased pain.  Xray right thigh to evaluate right femur, evaluate for calcifications in the soft tissue.  6. Follow-up with Nephrology and PCP.      In prescribing controlled substances to this patient, I certify that I have obtained and reviewed the medical history of Debby Carrizales. I have also made a good ly effort to obtain applicable records from other providers who have treated the patient and records demonstrating the following: report of \"drug-seeking behavior,\" although I suspect that she has organic reasons for pain and will continue to monitor as appropriate.     I have conducted a physical exam and documented it. I have reviewed Ms. Carrizales’s prescription history as maintained by the [place] Prescription Monitoring " Program.     I have assessed the patient’s risk for abuse, dependency, and addiction using the validated Opioid Risk Tool available at https://www.mdcalc.com/kppxdw-krhw-hwoq-ort-narcotic-abuse.     Given the above, I believe the benefits of controlled substance therapy outweigh the risks. The reasons for prescribing controlled substances include non-narcotic, oral analgesic alternatives have been inadequate for pain control and in my professional opinion, controlled substances are the only reasonable choice for this patient because she has limitations to medication choices due to being on dialysis.   Accordingly, I have discussed the risk and benefits, treatment plan, and alternative therapies with the patient.     Follow up: 2 months    Please note that this dictation was created using voice recognition software. I have made every reasonable attempt to correct obvious errors but there may be errors of grammar and content that I may have overlooked prior to finalization of this note.      Quinn Chandler MD  Interventional Spine and Sports Physiatry  Physical Medicine and Rehabilitation  [facility]

## 2022-03-08 RX ORDER — SEVELAMER CARBONATE 800 MG/1
3200 TABLET, FILM COATED ORAL
Qty: 480 TABLET | Refills: 11 | Status: SHIPPED | OUTPATIENT
Start: 2022-03-08

## 2022-05-02 NOTE — PROGRESS NOTES
Follow up patient note  Pain Medicine, Interventional spine and sports physiatry, Physical medicine rehabilitation      Chief complaint:   Cervicalgia, hips and knees, bilateral leg pain      HISTORY      HPI  Patient identification/Interval histotry: Debby Carrizales 37 y.o. female who has chronic pain related to rheumatologic disease, peripheral neuropathy and AVN hips, lupus.      She was hospitalized for pneumonia and continues to have pain with deep inspiration, primarily on the left side.  Finally, she is off of the oxygen.    No change to her chronic pain.  She has aching pain in her hips, numbness and tingling in the legs and hands with some burning pain.  She takes lyrica 100mg po tid without side effects.  She continues to feel that the oxycodone after dialysis has helped to improve her pain control.      No side effects from medications, mild constipation is managed with colace.  Regular bowel movements.         ROS Red Flags :   Chills, Sweats: Denies, no recent fevers  Involuntary Weight Loss: Denies  Bowel/Bladder Incontinence: Denies  Saddle Anesthesia: Denies    PMHx:   Past Medical History:   Diagnosis Date   • Arthritis     all joints,r/t lupus   • Avascular necrosis of bones of both hips (HCC) 10/10/2016   • Clostridium difficile colitis 5/3/2011   • Dialysis patient    • ESBL (extended spectrum beta-lactamase) producing bacteria infection 8/25/2014   • Fibromyalgia    • Hypertension    • Lupus    • Pneumonia    • Psychiatric disorder     anxiety, depression   • Pyelonephritis 11/21/2017   • Renal failure        PSHx:   Past Surgical History:   Procedure Laterality Date   • AV FISTULA REVISION Right 8/11/2018    Procedure: AV FISTULA REVISION- Graft and Thrombectomy;  Surgeon: Shabbir Ardon M.D.;  Location: SURGERY [facility];  Service: General   • AV FISTULA THROMBOLYSIS Right 8/11/2018    Procedure: AV FISTULA THROMBOLYSIS;  Surgeon: Shabbir Ardon M.D.;  Location:  SURGERY [facility];  Service: General   • AV FISTULA CREATION Right 12/9/2017    Procedure: AV FISTULA CREATION-ARM FOR GRAFT;  Surgeon: Lesly Jansen M.D.;  Location: SURGERY [facility];  Service: General   • THROMBECTOMY  12/9/2017    Procedure: THROMBECTOMY;  Surgeon: Lesly Jansen M.D.;  Location: SURGERY [facility];  Service: General   • THROMBECTOMY Right 8/21/2016    Procedure: THROMBECTOMY - right AV fistula graft with grams;  Surgeon: Shabbir Ardon M.D.;  Location: SURGERY [facility];  Service:    • ANGIOPLASTY BALLOON  8/21/2016    Procedure: ANGIOPLASTY BALLOON;  Surgeon: Shabbir Ardon M.D.;  Location: SURGERY [facility];  Service:    • THROMBECTOMY Right 8/20/2016    Procedure: THROMBECTOMY AV GRAFT;  Surgeon: Shabbir Ardon M.D.;  Location: SURGERY [facility];  Service:    • AV FISTULA CREATION Right 7/12/2016    Procedure: AV FISTULA CREATION WITH GRAFT BRACHIAL AXILLARY;  Surgeon: Shabbir Ardon M.D.;  Location: SURGERY [facility];  Service:    • CLOSED REDUCTION Right 7/5/2016    Procedure: CLOSED REDUCTION- Hip ;  Surgeon: Michael Holman M.D.;  Location: SURGERY [facility];  Service:    • HIP ARTHROPLASTY TOTAL Right 1/18/2016    Procedure: HIP ARTHROPLASTY TOTAL;  Surgeon: Michael Holman M.D.;  Location: [facility];  Service:    • AV FISTULA CREATION  2/3/2015    Performed by Shabbir Ardon M.D. at SURGERY [facility]   • AV FISTULA CREATION  11/14/2014    Performed by Shabbir Ardon M.D. at [facility]   • AV FISTULA CREATION  9/9/2014    Performed by Shabbir Ardon M.D. at SURGERY [facility]   • CATH PLACEMENT  9/9/2014    Performed by Shabbir Ardon M.D. at [facility]   • OTHER  5/2011    tracheostomy   • GASTROSCOPY-ENDO  4/27/2011    Performed by PALMIRA DOAN at ENDOSCOPY [facility]   • COLONOSCOPY WITH BIOPSY  4/20/2011    Performed by ALCIRA CRUZ at  ENDOSCOPY [facility]   • GASTROSCOPY-ENDO  4/18/2011    Performed by ALCIRA CRUZ at ENDOSCOPY [facility]   • GASTROSCOPY-ENDO  4/10/2011    Performed by SYED JURADO at ENDOSCOPY [facility]   • SCLEROTHERAPHY  4/10/2011    Performed by SYED JURADO at ENDOSCOPY [facility]   • OTHER ABDOMINAL SURGERY      kidney biopsy   • TRACHEOSTOMY         Family history   Denies neuromuscular disease  Family History   Problem Relation Age of Onset   • Heart Disease Mother    • Cancer Father        Medications:   Current Outpatient Prescriptions   Medication   • [START ON 8/30/2018] Oxycodone HCl 20 MG Tab   • doxycycline monohydrate (ADOXA) 100 MG tablet   • omeprazole (PRILOSEC) 20 MG delayed-release capsule   • pregabalin (LYRICA) 100 MG Cap   • promethazine (PHENERGAN) 25 MG Tab   • cinacalcet (SENSIPAR) 30 MG Tab   • sevelamer carbonate (RENVELA) 800 MG Tab tablet   • lidocaine (LIDODERM) 5 % Patch   • ferrous sulfate 325 (65 FE) MG tablet   • ascorbic acid (ASCORBIC ACID) 500 MG Tab   • buPROPion (WELLBUTRIN XL) 150 MG XL tablet   • hydroxychloroquine (PLAQUENIL) 200 MG Tab   • cholecalciferol (VITAMIN D3) 5000 UNIT Cap   • trazodone (DESYREL) 50 MG Tab   • tizanidine (ZANAFLEX) 4 MG Tab     No current facility-administered medications for this visit.        Allergies:   Allergies   Allergen Reactions   • Lorazepam [Ativan] Anxiety     Patient becomes severely paranoid and agitated   • Morphine Itching     Tolerates Dilaudid   • Seasonal Runny Nose and Itching     Hay fever, sabiha brush       Social Hx:   Social History     Social History   • Marital status: Single     Spouse name: N/A   • Number of children: N/A   • Years of education: N/A     Occupational History   • Not on file.     Social History Main Topics   • Smoking status: Former Smoker     Packs/day: 0.50     Years: 18.00     Types: Cigarettes     Quit date: 6/13/2011   • Smokeless tobacco: Never Used      Comment: 1/2  "ppd   • Alcohol use No   • Drug use: No   • Sexual activity: Not on file     Other Topics Concern   •  Service No   • Blood Transfusions Yes   • Caffeine Concern No   • Occupational Exposure No   • Hobby Hazards No   • Sleep Concern Yes   • Stress Concern Yes   • Weight Concern Yes   • Special Diet Yes   • Back Care No   • Exercise Yes   • Bike Helmet No   • Seat Belt Yes   • Self-Exams Yes     Social History Narrative   • No narrative on file       EXAMINATION     Physical Exam:   Vitals: Blood pressure 140/98, pulse 87, temperature 37 °C (98.6 °F), height 1.651 m (5' 5\"), weight 82 kg (180 lb 12.4 oz), SpO2 95 %.    Constitutional:   Body Habitus: Body mass index is 30.08 kg/m².  Cooperation: Fully cooperates with exam  Appearance: Well-groomed no disheveled, in no acute distress,   Respiratory-  breathing comfortable on room air, no wheezing.  Cardiovascular- No pitting edema noted in the lower extremities bilaterally  Psychiatric- alert and oriented ×3. Normal affect.   Spine: Tenderness to palpation over the lower thoracic rib cage on the left.  Slightly decreased motion with deep inspiration compared with the right. No midline thoracic spine tenderness.  Gait steady without loss of balance.      MEDICAL DECISION MAKING    DATA    Labs: No new labs to review  06/09/2018 TSH normal    Lab Results   Component Value Date/Time    HBA1C 4.9 06/07/2018 02:29 AM          Imaging:   I reviewed the following radiology reports previously reviewed       Chest xray 06/07/2018: Minimal right-sided opacities as described above, suggesting pneumonia.               Results for orders placed during the hospital encounter of 07/19/17   MR-LUMBAR SPINE-W/O    Impression 1.  Diffuse decreased bone marrow signal intensity throughout the lumbar spine and sacrum, presumably secondary to chronic anemia.    2.  Otherwise unremarkable MRI scan of the lumbar spine without contrast.                                    DIAGNOSIS "   Visit Diagnoses     ICD-10-CM   1. Acute bilateral low back pain without sciatica M54.5   2. Avascular necrosis of bones of both hips (HCC) M87.051    M87.052   3. Chronic, continuous use of opioids F11.90   4. Peripheral polyneuropathy (HCC) G62.9   5. Fibromyalgia M79.7   6. Rib cage region somatic dysfunction M99.08         ASSESSMENT and PLAN:     Debby Carrizales 37 y.o. Female returns for follow-up of her chronic pain related to lupus, AVN hips, low back and peripheral neuropathy    Debby was seen today for follow-up.    Diagnoses and all orders for this visit:    Acute bilateral low back pain without sciatica  -     Oxycodone HCl 20 MG Tab; Take 1 Tab by mouth every 6 hours as needed (Take up to three a day as needed and one additional pill after dialysis (3 times a week)) for up to 30 days.    Avascular necrosis of bones of both hips (HCC)  -     Oxycodone HCl 20 MG Tab; Take 1 Tab by mouth every 6 hours as needed (Take up to three a day as needed and one additional pill after dialysis (3 times a week)) for up to 30 days.    Chronic, continuous use of opioids    Peripheral polyneuropathy (HCC)    Fibromyalgia  -     Oxycodone HCl 20 MG Tab; Take 1 Tab by mouth every 6 hours as needed (Take up to three a day as needed and one additional pill after dialysis (3 times a week)) for up to 30 days.    Rib cage region somatic dysfunction  -     REFERRAL TO PHYSICAL THERAPY Reason for Therapy: Eval/Treat/Report    No changes recommended to her current medication routine.  She is going well with taking one extra dose after dialysis on those days has helped a lot.     Trial physical therapy for decreased chest wall mobility on the left, particularly.    Continue relaxation techniques.    In prescribing controlled substances to this patient, I certify that I have obtained and reviewed the medical history of Debby Carrizales. I have also made a good ly effort to obtain applicable records from other  "providers who have treated the patient and records demonstrating the following: report of \"drug-seeking behavior,\" although I suspect that she has organic reasons for pain and will continue to monitor as appropriate..     I have conducted a physical exam and documented it. I have reviewed Ms. Carrizales’s prescription history as maintained by the [place] Prescription Monitoring Program.     I have assessed the patient’s risk for abuse, dependency, and addiction using the validated Opioid Risk Tool available at https://www.mdcalc.com/bekqvc-lbaf-kxte-ort-narcotic-abuse.     Given the above, I believe the benefits of controlled substance therapy outweigh the risks. The reasons for prescribing controlled substances include non-narcotic, oral analgesic alternatives have been inadequate for pain control and in my professional opinion, controlled substances are the only reasonable choice for this patient because she has limitations to medication choices due to being on dialysis.  . Accordingly, I have discussed the risk and benefits, treatment plan, and alternative therapies with the patient.       Follow up: 1 month    Thank you for allowing me to participate in the care of this patient. If you have any questions please not hesitate to contact me.      Please note that this dictation was created using voice recognition software. I have made every reasonable attempt to correct obvious errors but there may be errors of grammar and content that I may have overlooked prior to finalization of this note.      Quinn Chandler MD  Interventional Spine and Sports Physiatry  Physical Medicine and Rehabilitation  [facility]  " 36.9

## 2022-05-04 RX ORDER — AMLODIPINE BESYLATE 10 MG/1
10 TABLET ORAL DAILY
Qty: 90 TABLET | Refills: 3 | Status: SHIPPED | OUTPATIENT
Start: 2022-05-04

## 2022-08-02 RX ORDER — TIZANIDINE 4 MG/1
TABLET ORAL
Qty: 180 TABLET | Refills: 3 | Status: SHIPPED | OUTPATIENT
Start: 2022-08-02

## 2022-08-30 NOTE — ED NOTES
"Attempted to medicate pt per orders, pt refused oxycodone and requests IV Dilaudid. She states \"I take those at home and they do not work, whatever they gave me earlier worked\". vss, medicated, with other meds per mar.   " Detail Level: Generalized

## 2022-10-14 ENCOUNTER — TELEPHONE (OUTPATIENT)
Dept: HEALTH INFORMATION MANAGEMENT | Facility: OTHER | Age: 42
End: 2022-10-14

## 2022-10-14 NOTE — TELEPHONE ENCOUNTER
Outcome: NO VM SET UP TO  ABOUT SETTING UP AWV    Please transfer to [facility] at [phone] when patient returns call.      Attempt # 1

## 2022-11-07 ENCOUNTER — PATIENT MESSAGE (OUTPATIENT)
Dept: HEALTH INFORMATION MANAGEMENT | Facility: OTHER | Age: 42
End: 2022-11-07

## 2023-05-28 NOTE — PROGRESS NOTES
[facility] Progress Note    [employer] ordered by Dr. Douglas. Connected aseptically to PD Cycler at 1645 for 10 hours using 2.5% PD solution.    Pt stable, VSS, alert and oriented.   PD exit site CDI, No s/sx of infection, dressing changed.     Education provided to primary RN regarding troubleshooting.     Report given to KALEY Woodruff RN.     Call [facility]/On Call at [phone] for further assistance.    steady

## 2023-10-11 ENCOUNTER — DOCUMENTATION (OUTPATIENT)
Dept: HEALTH INFORMATION MANAGEMENT | Facility: OTHER | Age: 43
End: 2023-10-11
Payer: MEDICARE

## 2024-04-12 NOTE — PROGRESS NOTES
Pt complaining of increased discomfort. Dr Purcell notified, no new orders at this time.    [de-identified] : 56 y/o F with b/l ear fullness.  Hx of Cerumen impaction.  Used debrox in the past, however, unable to use them herself due to Cerebral Palsy.

## 2024-07-05 NOTE — PROCEDURES
Appointment scheduled for Tuesday, 7/9/2024.   VIDEO ELECTROENCEPHALOGRAM REPORT        Referring provider: Dr. Yu.      DOS: 12/15/2018 (total recording of 1 hours and 26 minutes).      INDICATION:  Debby Carrizales 38 y.o. female presenting with altered mental status. Found in status epilepticus.     CURRENT ANTIEPILEPTIC REGIMEN: Levetiracetam 1000 mg daily, and Lacosamide 50 mg q 12 hrs.     TECHNIQUE: 30 channel extended video electroencephalogram (EEG) was performed in accordance with the international 10-20 system. The study was reviewed in bipolar and referential montages. The recording examined the patient during wakeful and drowsy state(s).      DESCRIPTION OF THE RECORD:  During the wakefulness, the background showed a symmetrical 7 Hz theta activity posteriorly with amplitude of 70 mV.  There was reactivity to eye closure/opening.  A normal anterior-posterior gradient was noted with faster beta frequencies seen anteriorly.  During drowsiness, theta/delta frequencies were seen.     ACTIVATION PROCEDURES:   Not performed.      ICTAL AND/OR INTERICTAL FINDINGS:   Frequent runs of generalized, frontally predominant, 2-3 HZ spike and slow waves, however no further seizures captured.     EKG: sampling of the EKG recording demonstrated sinus rhythm.      EVENTS:  None.      INTERPRETATION:  This is an abnormal extended video EEG recording in the awake, drowsy, and sleep state(s). A mild encephalopathy is suggested. Frequent runs of generalized, frontally predominant, 2-3 HZ spike and slow waves, however no further seizures captured.The patient remains at an increased risk for seizures. The findings suggest cortical irritability. Clinical and radiological correlation is recommended.     Updates provided to Dr. Caridad Flores MD   Epilepsy and Neurodiagnostics.   Clinical  of Neurology [facility].   Diplomate in Neurology, Epilepsy, and Electrodiagnostic Medicine.    Office: [phone]  Fax: [phone]

## 2024-08-06 NOTE — ED NOTES
Report to Layne FINCH, pt to be transferred to [address] here to  pt. Report given with last set of Vitals. Spoke with [facility] who is aware pt is leaving at this time.    diabetes

## 2025-04-25 NOTE — PROGRESS NOTES
Nephrology/Hemodialysis note    Patient with ESRD/HD admitted with noncompliance to HD / uremia  Seen and examined during HD  Tolerates well  VS stable  3 H/2 K/. UF 2-3 L  Please see dialysis flow sheet for details   FELICIANO

## 2025-05-12 NOTE — CARE PLAN
Patient Quality Outreach    Patient is due for the following:   Hypertension -  BP check  Physical Preventive Adult Physical    Action(s) Taken:   Left message for patient to call back and schedule an MA visit for a BP check when she is not ill.    Type of outreach:    Phone, left message for patient/parent to call back.    Questions for provider review:    None         Jasmin Trivedi  Chart routed to None.           Problem: Communication  Goal: The ability to communicate needs accurately and effectively will improve  Outcome: PROGRESSING AS EXPECTED  Pt is alert and oriented x 4 . Pt is oriented to the environment and call light. I have discussed with the pt the plan of care, treatments and medications and the pt verbalizes agreement and understanding. The pt has been able to communicate her needs effectively and has been given the opportunity to ask any questions. All pt needs have been met and questions have been answered at this time. Hourly rounding in place.         Problem: Safety  Goal: Will remain free from injury  Outcome: PROGRESSING AS EXPECTED  Pt assessed at beginning of shift. Pt determined to be standby assist . Fall precautions in place. Call light and personal possessions in reach, bed alarm on . Hourly rounding in place.

## (undated) DEVICE — SET LEADWIRE 5 LEAD BEDSIDE DISPOSABLE ECG (1SET OF 5/EA)

## (undated) DEVICE — NEPTUNE 4 PORT MANIFOLD - (20/PK)

## (undated) DEVICE — CATHETER EMBOLECTOMY 4FR (5EA/CA)

## (undated) DEVICE — PACK MINOR BASIN - (2EA/CA)

## (undated) DEVICE — DRAPE C-ARM LARGE 41IN X 74 IN - (10/BX 2BX/CA)

## (undated) DEVICE — SURGIFOAM (12X7) - (12EA/CA)

## (undated) DEVICE — TROCAR Z THREAD11MM OPTICAL - NON BLADED(6/BX)

## (undated) DEVICE — SODIUM CHL IRRIGATION 0.9% 1000ML (12EA/CA)

## (undated) DEVICE — GUIDEWIRE HYDROPHILIC COATED STRAIGHT TIP GLIDEWIRE .035 X 180CM (5EA/BX)"

## (undated) DEVICE — CHLORAPREP 26 ML APPLICATOR - ORANGE TINT(25/CA)

## (undated) DEVICE — SPONGE XRAY 8X4 STERL. 12PL - (10EA/TY 80TY/CA)

## (undated) DEVICE — SPONGE DRAIN 4 X 4IN 6-PLY - (2/PK25PK/BX12BX/CS)

## (undated) DEVICE — SUTURE 4-0 SILK 12 X 18 INCH - (36/BX)

## (undated) DEVICE — PROTECTOR ULNA NERVE - (36PR/CA)

## (undated) DEVICE — SUTURE GENERAL

## (undated) DEVICE — SUTURE 3-0 VICRYL PLUS SH - 8X 18 INCH (12/BX)

## (undated) DEVICE — SUTURE 4-0 MONOCRYL PLUS PS-1 - 27 INCH (36/BX)

## (undated) DEVICE — SUTURE CV

## (undated) DEVICE — MASK ANESTHESIA ADULT  - (100/CA)

## (undated) DEVICE — CLIP MED INTNL HRZN TI ESCP - (25/BX)

## (undated) DEVICE — SYRINGE SAFETY 5 ML 18 GA X 1-1/2 BLUNT LL (100/BX 4BX/CA)

## (undated) DEVICE — SUTURE 4-0 MONOCRYL PLUS PS-2 - 27 INCH (36/BX)

## (undated) DEVICE — DRAPE SURGICAL U 77X120 - (10/CA)

## (undated) DEVICE — SUTURE 4-0 VICRYL PLUS RB-1 - 27 INCH (36/BX)

## (undated) DEVICE — TUBING CLEARLINK DUO-VENT - C-FLO (48EA/CA)

## (undated) DEVICE — LACTATED RINGERS INJ 1000 ML - (14EA/CA 60CA/PF)

## (undated) DEVICE — GLOVE BIOGEL PI ORTHO SZ 7 PF LF (40PR/BX)

## (undated) DEVICE — CATHETER EMBOLECTOMY 3FR (1EA)

## (undated) DEVICE — SPONGE GAUZESTER. 2X2 4-PL - (2/PK 50PK/BX 30BX/CS)

## (undated) DEVICE — SYRINGE SAFETY TB 1 ML 27 GA X 1/2 IN (100/BX 4BX/CA)

## (undated) DEVICE — SUTURE 4-0 PROLENE PS-2 18 (36PK/BX)"

## (undated) DEVICE — SLEEVE, VASO, THIGH, MED

## (undated) DEVICE — SET EXTENSION WITH 2 PORTS (48EA/CA) ***PART #2C8610 IS A SUBSTITUTE*****

## (undated) DEVICE — KIT ROOM DECONTAMINATION

## (undated) DEVICE — KIT ANESTHESIA W/CIRCUIT & 3/LT BAG W/FILTER (20EA/CA)

## (undated) DEVICE — SYRINGE SAFETY 3 ML 18 GA X 1 1/2 BLUNT LL (100/BX 8BX/CA)

## (undated) DEVICE — ELECTRODE 850 FOAM ADHESIVE - HYDROGEL RADIOTRNSPRNT (50/PK)

## (undated) DEVICE — CATHETER FOGARTY GRAFT THROMB 5FR

## (undated) DEVICE — SPONGE GAUZE STER 4X4 8-PL - (2/PK 50PK/BX 12BX/CS)

## (undated) DEVICE — DISH PETRI STERILE (50EA/CA)

## (undated) DEVICE — SUCTION INSTRUMENT YANKAUER BULBOUS TIP W/O VENT (50EA/CA)

## (undated) DEVICE — DETERGENT RENUZYME PLUS 10 OZ PACKET (50/BX)

## (undated) DEVICE — DRESSING TRANSPARENT FILM TEGADERM 2.375 X 2.75"  (100EA/BX)"

## (undated) DEVICE — CAP DISCONNECT MINICAP (60/CA)

## (undated) DEVICE — BLADE SURGICAL #11 - (50/BX)

## (undated) DEVICE — DRAPE LARGE 3 QUARTER - (20/CA)

## (undated) DEVICE — DRAPE U ORTHOPEDIC - (10/BX)

## (undated) DEVICE — GLOVE SZ 8 BIOGEL PI MICRO - PF LF (50PR/BX)

## (undated) DEVICE — SUTURE 6-0 PROLENE BV-1 D/A 30 (36PK/BX)"

## (undated) DEVICE — CLIP SM INTNL HRZN TI ESCP LGT - (24EA/PK 25PK/BX)

## (undated) DEVICE — SUTURE GOR-TEX CV-6 TT-9 (36PK/BX)

## (undated) DEVICE — SENSOR SPO2 NEO LNCS ADHESIVE (20/BX) SEE USER NOTES

## (undated) DEVICE — COVER PROBE INTRAOPERATIVE KIT (10EA/CA)

## (undated) DEVICE — GOWN ULTRA SURGICAL LARGE (32/CA)

## (undated) DEVICE — GLOVE BIOGEL INDICATOR SZ 7SURGICAL PF LTX - (50/BX 4BX/CA)

## (undated) DEVICE — GLOVE SURGICAL LATEX POWDER FREE STIRLE SZ 8 ENCORE MICROPTIC (200PR/CA)

## (undated) DEVICE — VESSELOOP MINI BLUE STERILE - SURG-I-LOOP (10EA/BX)

## (undated) DEVICE — Device

## (undated) DEVICE — SHEET THYROID - (10EA/CA)

## (undated) DEVICE — SYRINGE SAFETY 10 ML 18 GA X 1 1/2 BLUNT LL (100/BX 4BX/CA)

## (undated) DEVICE — SUTURE 4-0 30CM STRATAFIX SPIRAL PS-2 (12EA/BX)

## (undated) DEVICE — GLOVE SZ 6 BIOGEL PI MICRO - PF LF (50PR/BX 4BX/CA)

## (undated) DEVICE — GLOVE BIOGEL SZ 6.5 SURGICAL PF LTX (50PR/BX 4BX/CA)

## (undated) DEVICE — BOVIE BLADE COATED - (50/PK)

## (undated) DEVICE — SUTURE 4-0 VICRYL PLUS FS-2 - 27 INCH (36/BX)

## (undated) DEVICE — GELAQUASONIC 100 ULTRASOUND - 48/BX 20GM STERILE FOIL POUCH

## (undated) DEVICE — HEAD HOLDER JUNIOR/ADULT

## (undated) DEVICE — GLOVE BIOGEL INDICATOR SZ 8.5 SURGICAL PF LTX - (50/BX 4BX/CA)

## (undated) DEVICE — DEVICE INFLATION DIGITAL BLUE DIAMOND (5EA/BX)

## (undated) DEVICE — MASK AIRWAY FLEXIBLE SINGLE-USE SIZE 3 CHILDREN (10EA/BX)

## (undated) DEVICE — GLOVE BIOGEL SZ 6 PF LATEX - (50EA/BX 4BX/CA)

## (undated) DEVICE — SUTURE 6-0 PROLENE BV-1 D/A 24 (36PK/BX)"

## (undated) DEVICE — CATHETER IRRIG OCC PRUITT 6FR

## (undated) DEVICE — DERMABOND ADVANCED - (12EA/BX)

## (undated) DEVICE — SUTURE 5-0 PROLENE BV-1 HEMOSEAL (36PK/BX)

## (undated) DEVICE — PAD PREP 24 X 48 CUFFED - (100/CA)

## (undated) DEVICE — DRESSING TRANSPARENT FILM TEGADERM 4 X 4.75" (50EA/BX)"

## (undated) DEVICE — SYRINGE DISP. 12 CC LL - (100/BX)

## (undated) DEVICE — DECANTER FLD BLS - (50/CA)

## (undated) DEVICE — BLADE SURGICAL #15 - (50/BX 3BX/CA)

## (undated) DEVICE — GOWN WARMING STANDARD FLEX - (30/CA)

## (undated) DEVICE — GLOVE BIOGEL INDICATOR SZ 6.5 SURGICAL PF LTX - (50PR/BX 4BX/CA)

## (undated) DEVICE — SYRINGE 30 ML LL (56/BX)

## (undated) DEVICE — SYRINGE 10 ML CONTROL LL (25EA/BX 4BX/CA)

## (undated) DEVICE — SUTURE 3-0 SILK 12 X 18 IN - (36/BX)

## (undated) DEVICE — SUTURE 3-0 VICRYL PLUS SH - 27 INCH (36/BX)

## (undated) DEVICE — SUTURE 5-0 PROLENE C-1 18 (36PK/BX)"

## (undated) DEVICE — SUTURE 3-0 ETHILON FS-1 - (36/BX) 30 INCH

## (undated) DEVICE — GLOVE BIOGEL SZ 7 SURGICAL PF LTX - (50PR/BX 4BX/CA)

## (undated) DEVICE — ADHESIVE DERMABOND HVD MINI (12EA/BX)

## (undated) DEVICE — NEEDLE NON SAFETY 25 GA X 1 1/2 IN HYPO (100EA/BX)

## (undated) DEVICE — SOD. CHL. INJ. 0.9% 250 ML - (36/CA 50CA/PF)

## (undated) DEVICE — TUBING INSUFFLATION PNEUMOCLEAR HIGH-FLOW (10EA/BX)

## (undated) DEVICE — PACK AV FISTULA (4EA/CA)

## (undated) DEVICE — GOWN SURGEONS LARGE - (32/CA)

## (undated) DEVICE — BAG DECANTER (50EA/CS)

## (undated) DEVICE — CORDS BIPOLAR COAGULATION - 12FT STERILE DISP. (10EA/BX)

## (undated) DEVICE — CANNULA W/ SUPPLY TUBING O2 - (50/CA)

## (undated) DEVICE — GLOVE BIOGEL SZ 7.5 SURGICAL PF LTX - (50PR/BX 4BX/CA)

## (undated) DEVICE — BAG SPONGE COUNT 10.25 X 32 - BLUE (250/CA)

## (undated) DEVICE — CANISTER SUCTION 3000ML MECHANICAL FILTER AUTO SHUTOFF MEDI-VAC NONSTERILE LF DISP  (40EA/CA)

## (undated) DEVICE — VESSELOOP MAXI BLUE STERILE- SURG-I-LOOP (10EA/BX)

## (undated) DEVICE — GUIDEWIRES STARTER (PTFE COATED) J CURVED FIXED CORE 0.035 180CM 10 3 MM J FS

## (undated) DEVICE — GLOVE BIOGEL PI ORTHO SZ 6 SURGICAL PF LF (40PR/BX)

## (undated) DEVICE — TOWEL STOP TIMEOUT SAFETY FLAG (40EA/CA)

## (undated) DEVICE — SODIUM CHL. INJ. 0.9% 500ML (24EA/CA 50CA/PF)

## (undated) DEVICE — GLOVE BIOGEL PI INDICATOR SZ 8.0 SURGICAL PF LF -(50/BX 4BX/CA)

## (undated) DEVICE — ELECTRODE DUAL RETURN W/ CORD - (50/PK)

## (undated) DEVICE — SUTURE GORE-TEX CV-5 TT-13 (12/BX)

## (undated) DEVICE — SYRINGE 20 ML LL (50EA/BX 4BX/CA)

## (undated) DEVICE — PENCIL ELECTSURG 10FT BTN SWH - (50/CA)

## (undated) DEVICE — SUTURE 2-0 ETHILON FS - (36/BX) 18 INCH

## (undated) DEVICE — PACK LAP CHOLE OR - (2EA/CA)

## (undated) DEVICE — SYRINGE NON SAFETY 5 CC 20 GA X 1-1/2 IN (100/BX 4BX/CA)

## (undated) DEVICE — PEN SKIN MARKER W/RULER - (50EA/BX)

## (undated) DEVICE — SPONGE GAUZESTER 4 X 4 4PLY - (128PK/CA)

## (undated) DEVICE — TROCAR 5X100 NON BLADED Z-TH - READ KII (6/BX)

## (undated) DEVICE — COVER PROBE STERILE CONE (12EA/CA)

## (undated) DEVICE — GLOVE BIOGEL SZ 8.5 SURGICAL PF LTX - (50PR/BX 4BX/CA)

## (undated) DEVICE — GLOVE BIOGEL ECLIPSE  PF LATEX SIZE 6.5 (50PR/BX)

## (undated) DEVICE — DRAIN PENROSE 1 IN X 18 IN - STERILE (25EA/BX)

## (undated) DEVICE — SLEEVE VASO CALF MED - (10PR/CA)

## (undated) DEVICE — CATHETER IRRIG OCC PRUITT 4FR

## (undated) DEVICE — SUTURE 2-0 VICRYL PLUS CT-1 36 (36PK/BX)"

## (undated) DEVICE — GLOVE BIOGEL ECLIPSE PF LATEX SIZE 9.0